# Patient Record
Sex: MALE | Race: WHITE | NOT HISPANIC OR LATINO | Employment: OTHER | URBAN - METROPOLITAN AREA
[De-identification: names, ages, dates, MRNs, and addresses within clinical notes are randomized per-mention and may not be internally consistent; named-entity substitution may affect disease eponyms.]

---

## 2018-05-29 ENCOUNTER — TRANSCRIBE ORDERS (OUTPATIENT)
Dept: LAB | Facility: CLINIC | Age: 78
End: 2018-05-29

## 2018-05-29 ENCOUNTER — LAB (OUTPATIENT)
Dept: LAB | Facility: CLINIC | Age: 78
End: 2018-05-29
Payer: COMMERCIAL

## 2018-05-29 DIAGNOSIS — E11.9 TYPE 2 DIABETES MELLITUS WITHOUT COMPLICATION, UNSPECIFIED WHETHER LONG TERM INSULIN USE (HCC): ICD-10-CM

## 2018-05-29 DIAGNOSIS — E11.9 TYPE 2 DIABETES MELLITUS WITHOUT COMPLICATION, UNSPECIFIED WHETHER LONG TERM INSULIN USE (HCC): Primary | ICD-10-CM

## 2018-05-29 LAB
ANION GAP SERPL CALCULATED.3IONS-SCNC: 5 MMOL/L (ref 4–13)
BUN SERPL-MCNC: 16 MG/DL (ref 5–25)
CALCIUM SERPL-MCNC: 9 MG/DL (ref 8.3–10.1)
CHLORIDE SERPL-SCNC: 103 MMOL/L (ref 100–108)
CO2 SERPL-SCNC: 31 MMOL/L (ref 21–32)
CREAT SERPL-MCNC: 0.89 MG/DL (ref 0.6–1.3)
EST. AVERAGE GLUCOSE BLD GHB EST-MCNC: 189 MG/DL
GFR SERPL CREATININE-BSD FRML MDRD: 81 ML/MIN/1.73SQ M
GLUCOSE P FAST SERPL-MCNC: 129 MG/DL (ref 65–99)
HBA1C MFR BLD: 8.2 % (ref 4.2–6.3)
POTASSIUM SERPL-SCNC: 4 MMOL/L (ref 3.5–5.3)
SODIUM SERPL-SCNC: 139 MMOL/L (ref 136–145)

## 2018-05-29 PROCEDURE — 83036 HEMOGLOBIN GLYCOSYLATED A1C: CPT

## 2018-05-29 PROCEDURE — 80048 BASIC METABOLIC PNL TOTAL CA: CPT

## 2018-05-29 PROCEDURE — 36415 COLL VENOUS BLD VENIPUNCTURE: CPT

## 2018-09-27 ENCOUNTER — LAB (OUTPATIENT)
Dept: LAB | Facility: CLINIC | Age: 78
End: 2018-09-27
Payer: COMMERCIAL

## 2018-09-27 ENCOUNTER — TRANSCRIBE ORDERS (OUTPATIENT)
Dept: LAB | Facility: CLINIC | Age: 78
End: 2018-09-27

## 2018-09-27 DIAGNOSIS — E78.5 HYPERLIPIDEMIA, UNSPECIFIED HYPERLIPIDEMIA TYPE: ICD-10-CM

## 2018-09-27 DIAGNOSIS — E78.5 HYPERLIPIDEMIA, UNSPECIFIED HYPERLIPIDEMIA TYPE: Primary | ICD-10-CM

## 2018-09-27 LAB
ANION GAP SERPL CALCULATED.3IONS-SCNC: 6 MMOL/L (ref 4–13)
AST SERPL W P-5'-P-CCNC: 14 U/L (ref 5–45)
BUN SERPL-MCNC: 28 MG/DL (ref 5–25)
CALCIUM SERPL-MCNC: 9 MG/DL (ref 8.3–10.1)
CHLORIDE SERPL-SCNC: 103 MMOL/L (ref 100–108)
CO2 SERPL-SCNC: 31 MMOL/L (ref 21–32)
CREAT SERPL-MCNC: 1.31 MG/DL (ref 0.6–1.3)
EST. AVERAGE GLUCOSE BLD GHB EST-MCNC: 174 MG/DL
GFR SERPL CREATININE-BSD FRML MDRD: 51 ML/MIN/1.73SQ M
GLUCOSE P FAST SERPL-MCNC: 137 MG/DL (ref 65–99)
HBA1C MFR BLD: 7.7 % (ref 4.2–6.3)
LDLC SERPL DIRECT ASSAY-MCNC: 115 MG/DL (ref 0–100)
POTASSIUM SERPL-SCNC: 4.1 MMOL/L (ref 3.5–5.3)
SODIUM SERPL-SCNC: 140 MMOL/L (ref 136–145)

## 2018-09-27 PROCEDURE — 83036 HEMOGLOBIN GLYCOSYLATED A1C: CPT

## 2018-09-27 PROCEDURE — 80048 BASIC METABOLIC PNL TOTAL CA: CPT

## 2018-09-27 PROCEDURE — 36415 COLL VENOUS BLD VENIPUNCTURE: CPT

## 2018-09-27 PROCEDURE — 84450 TRANSFERASE (AST) (SGOT): CPT

## 2018-09-27 PROCEDURE — 83721 ASSAY OF BLOOD LIPOPROTEIN: CPT

## 2019-01-16 DIAGNOSIS — I10 ESSENTIAL HYPERTENSION: Primary | ICD-10-CM

## 2019-01-17 RX ORDER — LISINOPRIL 5 MG/1
TABLET ORAL
Qty: 90 TABLET | Refills: 3 | Status: SHIPPED | OUTPATIENT
Start: 2019-01-17 | End: 2019-01-25 | Stop reason: HOSPADM

## 2019-01-23 ENCOUNTER — APPOINTMENT (EMERGENCY)
Dept: CT IMAGING | Facility: HOSPITAL | Age: 79
DRG: 844 | End: 2019-01-23
Payer: COMMERCIAL

## 2019-01-23 ENCOUNTER — HOSPITAL ENCOUNTER (INPATIENT)
Facility: HOSPITAL | Age: 79
LOS: 2 days | Discharge: HOME | DRG: 844 | End: 2019-01-25
Attending: EMERGENCY MEDICINE | Admitting: INTERNAL MEDICINE
Payer: COMMERCIAL

## 2019-01-23 DIAGNOSIS — N17.9 ACUTE RENAL FAILURE (ARF) (HCC): ICD-10-CM

## 2019-01-23 DIAGNOSIS — I10 ESSENTIAL HYPERTENSION: Chronic | ICD-10-CM

## 2019-01-23 DIAGNOSIS — D64.9 ANEMIA, UNSPECIFIED TYPE: ICD-10-CM

## 2019-01-23 DIAGNOSIS — E08.22 DIABETES MELLITUS DUE TO UNDERLYING CONDITION WITH STAGE 3 CHRONIC KIDNEY DISEASE, UNSPECIFIED WHETHER LONG TERM INSULIN USE: Chronic | ICD-10-CM

## 2019-01-23 DIAGNOSIS — N13.4 HYDROURETER: Chronic | ICD-10-CM

## 2019-01-23 DIAGNOSIS — N18.30 ACUTE RENAL FAILURE SUPERIMPOSED ON STAGE 3 CHRONIC KIDNEY DISEASE (HCC): Chronic | ICD-10-CM

## 2019-01-23 DIAGNOSIS — N17.9 ACUTE RENAL FAILURE SUPERIMPOSED ON STAGE 3 CHRONIC KIDNEY DISEASE (HCC): Chronic | ICD-10-CM

## 2019-01-23 DIAGNOSIS — N18.3 DIABETES MELLITUS DUE TO UNDERLYING CONDITION WITH STAGE 3 CHRONIC KIDNEY DISEASE, UNSPECIFIED WHETHER LONG TERM INSULIN USE: Chronic | ICD-10-CM

## 2019-01-23 DIAGNOSIS — R19.00 ABDOMINAL MASS: Primary | ICD-10-CM

## 2019-01-23 PROBLEM — R53.1 WEAKNESS GENERALIZED: Chronic | Status: ACTIVE | Noted: 2019-01-23

## 2019-01-23 PROBLEM — E78.5 HYPERLIPIDEMIA: Chronic | Status: ACTIVE | Noted: 2019-01-23

## 2019-01-23 PROBLEM — R19.07 GENERALIZED ABDOMINAL MASS: Status: ACTIVE | Noted: 2019-01-23

## 2019-01-23 PROBLEM — R19.7 DIARRHEA: Chronic | Status: ACTIVE | Noted: 2019-01-23

## 2019-01-23 PROBLEM — I51.9 CARDIAC DISEASE: Chronic | Status: ACTIVE | Noted: 2019-01-23

## 2019-01-23 PROBLEM — R19.07 GENERALIZED ABDOMINAL MASS: Chronic | Status: ACTIVE | Noted: 2019-01-23

## 2019-01-23 PROBLEM — R53.1 WEAKNESS GENERALIZED: Status: ACTIVE | Noted: 2019-01-23

## 2019-01-23 PROBLEM — R19.7 DIARRHEA: Status: ACTIVE | Noted: 2019-01-23

## 2019-01-23 PROBLEM — E11.9 DIABETES MELLITUS (HCC): Chronic | Status: ACTIVE | Noted: 2019-01-23

## 2019-01-23 LAB
ALBUMIN SERPL BCP-MCNC: 4.2 G/DL (ref 3.5–5.7)
ALP SERPL-CCNC: 109 U/L (ref 55–165)
ALT SERPL W P-5'-P-CCNC: 9 U/L (ref 7–52)
ANION GAP SERPL CALCULATED.3IONS-SCNC: 14 MMOL/L (ref 4–13)
APTT PPP: 30 SECONDS (ref 26–38)
AST SERPL W P-5'-P-CCNC: 11 U/L (ref 13–39)
BACTERIA UR QL AUTO: ABNORMAL /HPF
BASOPHILS # BLD AUTO: 0.1 THOUSANDS/ΜL (ref 0–0.1)
BASOPHILS NFR BLD AUTO: 1 % (ref 0–2)
BILIRUB SERPL-MCNC: 0.3 MG/DL (ref 0.2–1)
BILIRUB UR QL STRIP: NEGATIVE
BUN SERPL-MCNC: 102 MG/DL (ref 7–25)
CALCIUM SERPL-MCNC: 9.2 MG/DL (ref 8.6–10.5)
CHLORIDE SERPL-SCNC: 99 MMOL/L (ref 98–107)
CLARITY UR: CLEAR
CO2 SERPL-SCNC: 18 MMOL/L (ref 21–31)
COLOR UR: ABNORMAL
CREAT SERPL-MCNC: 6.89 MG/DL (ref 0.7–1.3)
CRP SERPL HS-MCNC: 14.3 MG/L
EOSINOPHIL # BLD AUTO: 0.1 THOUSAND/ΜL (ref 0–0.61)
EOSINOPHIL NFR BLD AUTO: 1 % (ref 0–5)
ERYTHROCYTE [DISTWIDTH] IN BLOOD BY AUTOMATED COUNT: 13.1 % (ref 11.5–14.5)
GFR SERPL CREATININE-BSD FRML MDRD: 7 ML/MIN/1.73SQ M
GLUCOSE SERPL-MCNC: 158 MG/DL (ref 65–140)
GLUCOSE SERPL-MCNC: 205 MG/DL (ref 65–140)
GLUCOSE SERPL-MCNC: 327 MG/DL (ref 65–99)
GLUCOSE UR STRIP-MCNC: NEGATIVE MG/DL
HCT VFR BLD AUTO: 34.2 % (ref 36.5–49.3)
HEMOCCULT SP1 STL QL: NEGATIVE
HGB BLD-MCNC: 11.4 G/DL (ref 14–18)
HGB UR QL STRIP.AUTO: ABNORMAL
INR PPP: 1.06 (ref 0.9–1.5)
KETONES UR STRIP-MCNC: NEGATIVE MG/DL
LEUKOCYTE ESTERASE UR QL STRIP: NEGATIVE
LIPASE SERPL-CCNC: 172 U/L (ref 11–82)
LYMPHOCYTES # BLD AUTO: 0.6 THOUSANDS/ΜL (ref 0.6–4.47)
LYMPHOCYTES NFR BLD AUTO: 5 % (ref 21–51)
MCH RBC QN AUTO: 30.2 PG (ref 26–34)
MCHC RBC AUTO-ENTMCNC: 33.3 G/DL (ref 31–37)
MCV RBC AUTO: 91 FL (ref 81–99)
MONOCYTES # BLD AUTO: 0.6 THOUSAND/ΜL (ref 0.17–1.22)
MONOCYTES NFR BLD AUTO: 6 % (ref 2–12)
NEUTROPHILS # BLD AUTO: 9.3 THOUSANDS/ΜL (ref 1.4–6.5)
NEUTS SEG NFR BLD AUTO: 88 % (ref 42–75)
NITRITE UR QL STRIP: NEGATIVE
NON-SQ EPI CELLS URNS QL MICRO: ABNORMAL /HPF
NRBC BLD AUTO-RTO: 0 /100 WBCS
PH UR STRIP.AUTO: 5.5 [PH] (ref 5–8)
PLATELET # BLD AUTO: 200 THOUSANDS/UL (ref 149–390)
PMV BLD AUTO: 9.6 FL (ref 8.6–11.7)
POTASSIUM SERPL-SCNC: 5.3 MMOL/L (ref 3.5–5.5)
PROT SERPL-MCNC: 7.5 G/DL (ref 6.4–8.9)
PROT UR STRIP-MCNC: NEGATIVE MG/DL
PROTHROMBIN TIME: 12.3 SECONDS (ref 10.2–13)
RBC # BLD AUTO: 3.78 MILLION/UL (ref 4.3–5.9)
RBC #/AREA URNS AUTO: ABNORMAL /HPF
SODIUM SERPL-SCNC: 131 MMOL/L (ref 134–143)
SP GR UR STRIP.AUTO: 1.02 (ref 1–1.03)
UROBILINOGEN UR QL STRIP.AUTO: 0.2 E.U./DL
WBC # BLD AUTO: 10.6 THOUSAND/UL (ref 4.8–10.8)
WBC #/AREA URNS AUTO: ABNORMAL /HPF

## 2019-01-23 PROCEDURE — 81003 URINALYSIS AUTO W/O SCOPE: CPT | Performed by: EMERGENCY MEDICINE

## 2019-01-23 PROCEDURE — 85025 COMPLETE CBC W/AUTO DIFF WBC: CPT | Performed by: EMERGENCY MEDICINE

## 2019-01-23 PROCEDURE — 74176 CT ABD & PELVIS W/O CONTRAST: CPT

## 2019-01-23 PROCEDURE — 87177 OVA AND PARASITES SMEARS: CPT | Performed by: NURSE PRACTITIONER

## 2019-01-23 PROCEDURE — 99285 EMERGENCY DEPT VISIT HI MDM: CPT

## 2019-01-23 PROCEDURE — 96360 HYDRATION IV INFUSION INIT: CPT

## 2019-01-23 PROCEDURE — 87209 SMEAR COMPLEX STAIN: CPT | Performed by: NURSE PRACTITIONER

## 2019-01-23 PROCEDURE — 82270 OCCULT BLOOD FECES: CPT | Performed by: NURSE PRACTITIONER

## 2019-01-23 PROCEDURE — 87505 NFCT AGENT DETECTION GI: CPT | Performed by: NURSE PRACTITIONER

## 2019-01-23 PROCEDURE — 83690 ASSAY OF LIPASE: CPT | Performed by: EMERGENCY MEDICINE

## 2019-01-23 PROCEDURE — 81001 URINALYSIS AUTO W/SCOPE: CPT | Performed by: EMERGENCY MEDICINE

## 2019-01-23 PROCEDURE — 85730 THROMBOPLASTIN TIME PARTIAL: CPT | Performed by: EMERGENCY MEDICINE

## 2019-01-23 PROCEDURE — 99222 1ST HOSP IP/OBS MODERATE 55: CPT | Performed by: NURSE PRACTITIONER

## 2019-01-23 PROCEDURE — 82948 REAGENT STRIP/BLOOD GLUCOSE: CPT

## 2019-01-23 PROCEDURE — 82272 OCCULT BLD FECES 1-3 TESTS: CPT

## 2019-01-23 PROCEDURE — 86141 C-REACTIVE PROTEIN HS: CPT | Performed by: EMERGENCY MEDICINE

## 2019-01-23 PROCEDURE — 80053 COMPREHEN METABOLIC PANEL: CPT | Performed by: EMERGENCY MEDICINE

## 2019-01-23 PROCEDURE — 85610 PROTHROMBIN TIME: CPT | Performed by: EMERGENCY MEDICINE

## 2019-01-23 PROCEDURE — 87493 C DIFF AMPLIFIED PROBE: CPT | Performed by: NURSE PRACTITIONER

## 2019-01-23 PROCEDURE — 36415 COLL VENOUS BLD VENIPUNCTURE: CPT | Performed by: EMERGENCY MEDICINE

## 2019-01-23 PROCEDURE — 96361 HYDRATE IV INFUSION ADD-ON: CPT

## 2019-01-23 PROCEDURE — 87205 SMEAR GRAM STAIN: CPT | Performed by: NURSE PRACTITIONER

## 2019-01-23 RX ORDER — ONDANSETRON 2 MG/ML
4 INJECTION INTRAMUSCULAR; INTRAVENOUS EVERY 6 HOURS PRN
Status: DISCONTINUED | OUTPATIENT
Start: 2019-01-23 | End: 2019-01-25 | Stop reason: HOSPADM

## 2019-01-23 RX ORDER — GLIPIZIDE 5 MG/1
5 TABLET ORAL
Status: ON HOLD | COMMUNITY
End: 2019-01-25

## 2019-01-23 RX ORDER — SODIUM CHLORIDE 9 MG/ML
100 INJECTION, SOLUTION INTRAVENOUS CONTINUOUS
Status: DISPENSED | OUTPATIENT
Start: 2019-01-23 | End: 2019-01-24

## 2019-01-23 RX ORDER — ACETAMINOPHEN 325 MG/1
650 TABLET ORAL EVERY 6 HOURS PRN
Status: DISCONTINUED | OUTPATIENT
Start: 2019-01-23 | End: 2019-01-25 | Stop reason: HOSPADM

## 2019-01-23 RX ORDER — ASPIRIN 81 MG/1
81 TABLET, CHEWABLE ORAL DAILY
Status: DISCONTINUED | OUTPATIENT
Start: 2019-01-24 | End: 2019-01-25 | Stop reason: HOSPADM

## 2019-01-23 RX ORDER — HEPARIN SODIUM 5000 [USP'U]/ML
5000 INJECTION, SOLUTION INTRAVENOUS; SUBCUTANEOUS EVERY 8 HOURS SCHEDULED
Status: DISCONTINUED | OUTPATIENT
Start: 2019-01-23 | End: 2019-01-25 | Stop reason: HOSPADM

## 2019-01-23 RX ADMIN — HEPARIN SODIUM 5000 UNITS: 5000 INJECTION INTRAVENOUS; SUBCUTANEOUS at 21:47

## 2019-01-23 RX ADMIN — INSULIN LISPRO 1 UNITS: 100 INJECTION, SOLUTION INTRAVENOUS; SUBCUTANEOUS at 18:29

## 2019-01-23 RX ADMIN — INSULIN LISPRO 1 UNITS: 100 INJECTION, SOLUTION INTRAVENOUS; SUBCUTANEOUS at 22:07

## 2019-01-23 RX ADMIN — IOHEXOL 50 ML: 240 INJECTION, SOLUTION INTRATHECAL; INTRAVASCULAR; INTRAVENOUS; ORAL at 14:00

## 2019-01-23 RX ADMIN — SODIUM CHLORIDE 1000 ML: 0.9 INJECTION, SOLUTION INTRAVENOUS at 13:47

## 2019-01-23 RX ADMIN — SODIUM CHLORIDE 100 ML/HR: 9 INJECTION, SOLUTION INTRAVENOUS at 18:06

## 2019-01-23 NOTE — H&P
H&P- Keren Lopes 1940, 77 y o  male MRN: [id number]    Unit/Bed#: -01 Encounter: [id number]    Primary Care Provider: Jeferson Sidhu DO   Date and time admitted to hospital: 1/23/2019  1:17 PM        * Generalized abdominal mass   Assessment & Plan    · Consult to Dr Sharan Lin oncology  CT abdomen pelvis wo contrast :      Reason For Exam     left sided ab pain, renal failure   Study Result     CT ABDOMEN AND PELVIS WITHOUT IV CONTRAST     INDICATION:   left sided ab pain, renal failure      COMPARISON:  None      TECHNIQUE:  CT examination of the abdomen and pelvis was performed without intravenous contrast   Axial, sagittal, and coronal 2D reformatted images were created from the source data and submitted for interpretation       Radiation dose length product (DLP) for this visit:  367 8 mGy-cm   This examination, like all CT scans performed in the [facility], was performed utilizing techniques to minimize radiation dose exposure, including the use of iterative   reconstruction and automated exposure control       Enteric contrast was administered       FINDINGS:     ABDOMEN     LOWER CHEST:  Lung bases are clear  Coronary artery calcification noted      LIVER/BILIARY TREE:  There are numerous low density liver masses concerning for metastatic disease  These vary in size from quite small up to about 5 cm  Visualization limited without the benefit of contrast   Overall, the liver is not significantly   enlarged  No gross evidence of biliary obstruction      GALLBLADDER:  No calcified gallstones  No pericholecystic inflammatory change      SPLEEN:  Unremarkable      PANCREAS:  Unremarkable      ADRENAL GLANDS:  Unremarkable      KIDNEYS/URETERS:  There is mild bilateral hydroureteronephrosis  No kidney stones are seen  No perirenal fluid  The cause of the obstruction seems to most likely be severe bladder wall thickening      STOMACH AND BOWEL:  There is no bowel obstruction  No bowel wall thickening or acute inflammation      APPENDIX:  No findings to suggest appendicitis      ABDOMINOPELVIC CAVITY:  There is a partially calcified and partially soft tissue density mass in the mesentery which has an irregular lobulated contour  The size is difficult to measure but it is on the order of 7 cm maximum diameter  This might   represent carcinoid or desmoid or fibromatosis, however, given the presence of numerous liver lesions, it is probably a malignant lesion  There is questionably a 2nd much smaller lesion in the left lower quadrant adjacent to the sigmoid colon which also   demonstrates mixed density characteristics and is only about 11 x 14 mm  There is no ascites or free air      VESSELS:  Atherosclerotic changes are present  No evidence of aneurysm      PELVIS     REPRODUCTIVE ORGANS:  There is prostatomegaly estimated to be 5 8 x 5 5 cm      URINARY BLADDER:  Bladder is collapsed around Haile catheter and appears quite thick walled diffusely  No stones are seen  No obvious inflammation      ABDOMINAL WALL/INGUINAL REGIONS:  Unremarkable      OSSEOUS STRUCTURES:  No acute fracture or destructive osseous lesion      IMPRESSION:     Partially calcified mesenteric mass concerning for a malignant lesion, perhaps a carcinoid with numerous low-density liver masses almost certainly metastatic disease  Suggest consideration for tissue sampling with biopsy of one of the liver lesions      Bilateral hydroureteronephrosis probably secondary to severe bladder wall thickening    Haile catheter appear satisfactory in position with no significant distention of the bladder      Prostatomegaly      Questionable secondary small mass in the left lower quadrant mesentery         Workstation performed: [id number]    ·     ·       Hydroureter   Assessment & Plan    · Consult Dr Atul Stevens     Acute renal failure superimposed on stage 3 chronic kidney disease [facility])   Assessment & Plan    · Haile catheter has been placed we will monitor labs     Diarrhea   Assessment & Plan    · Stool studies will be obtained     Weakness generalized   Assessment & Plan    · Fall precautions     Diabetes mellitus [facility])   Assessment & Plan    Lab Results   Component Value Date    HGBA1C 7 7 (H) 09/27/2018       No results for input(s): POCGLU in the last 72 hours  Blood Sugar Average: Last 72 hrs:  ·  chronic condition   · Accu-Cheks Q a c  And HS with sliding scale insulin     Hypertension   Assessment & Plan    · Chronic and stable  · Lisinopril will be on hold secondary to kidney failure     Hyperlipidemia   Assessment & Plan    · Chronic and stable  · Diet controlled     Cardiac disease   Assessment & Plan    · Chronic and stable         VTE Prophylaxis: Heparin  Code Status:  Full  POLST: POLST is not applicable to this patient  Discussion with family:  Need to stay for consultations with Urology and Oncology    Anticipated Length of Stay:  Patient will be admitted on an Inpatient basis with an anticipated length of stay of  > 2 midnights  Justification for Hospital Stay:  Stay for consultations with Urology and Oncology    Total Time for Visit, including Counseling / Coordination of Care: 70   Greater than 50% of this total time spent on direct patient counseling and coordination of care  Chief Complaint:   Left-sided abdominal pain    History of Present Illness:    Arpita Burks is a 77 y o  male who presents with left-sided abdominal pain with diarrhea for 2 weeks weakness, and vomited 2 times last evening  Patient states that at times he has had watery and black stools but he does take iron on a daily basis  He states that he has had an EGD and colonoscopy in the past year  Patient had CT of the abdomen and showed mesenteric mass and bilateral hydro ureteral nephrosis    Review of Systems:  Review of Systems   Constitutional: Negative  HENT: Negative  Eyes: Negative  Respiratory: Negative  Cardiovascular: Negative  Gastrointestinal: Positive for abdominal pain and diarrhea  Endocrine: Negative  Genitourinary: Negative  Musculoskeletal: Negative  Skin: Negative  Allergic/Immunologic: Negative  Neurological: Negative  Hematological: Negative  Psychiatric/Behavioral: Negative  Past Medical and Surgical History:   Past Medical History:   Diagnosis Date    Cardiac disease     Diabetes mellitus ([facility] [address] )     Hyperlipidemia     Hypertension        History reviewed  No pertinent surgical history  Meds/Allergies:  Prior to Admission medications    Medication Sig Start Date End Date Taking? Authorizing Provider   aspirin 81 MG tablet Take 81 mg by mouth daily   Yes Historical Provider, MD   glipiZIDE (GLUCOTROL) 5 mg tablet Take 5 mg by mouth 2 (two) times a day before meals   Yes Historical Provider, MD   Red Yeast Rice Extract (RED YEAST RICE PO) Take by mouth   Yes Historical Provider, MD   lisinopril (ZESTRIL) 5 mg tablet TAKE ONE TABLET BY MOUTH EVERY DAY 1/17/19   Marquez Callaway PA-C     I have reviewed home medications with patient personally  Allergies: No Known Allergies    Social History:  Marital Status:     Occupation:  Retired  Patient Pre-hospital Living Situation:  At home  Patient Pre-hospital Level of Mobility:  Full  Patient Pre-hospital Diet Restrictions:  Diabetic  Substance Use History:     History   Alcohol Use No     History   Smoking Status    Never Smoker   Smokeless Tobacco    Never Used     History   Drug Use No       Family History:  I have reviewed the patients family history    Physical Exam:   Vitals:   Blood Pressure: 162/82 (01/23/19 1741)  Pulse: 91 (01/23/19 1741)  Temperature: (!) 97 4 °F (36 3 °C) (01/23/19 1741)  Temp Source: Tympanic (01/23/19 1741)  Respirations: 18 (01/23/19 1741)  Height: 5' 9" (175 3 cm) (01/23/19 1314)  Weight - Scale: 70 3 kg (155 lb) (01/23/19 1314)  SpO2: 99 % (01/23/19 1741)    Physical Exam Constitutional: He is oriented to person, place, and time  Vital signs are normal  He appears well-developed and well-nourished  He is cooperative  HENT:   Head: Normocephalic and atraumatic  Nose: Nose normal    Mouth/Throat: Mucous membranes are normal    Eyes: Pupils are equal, round, and reactive to light  Conjunctivae and EOM are normal    Neck: Normal range of motion and full passive range of motion without pain  Neck supple  Cardiovascular: Normal rate, regular rhythm, normal heart sounds and normal pulses  Pulmonary/Chest: Effort normal and breath sounds normal    Abdominal: Normal appearance  There is generalized tenderness  Genitourinary:   Genitourinary Comments: Haile catheter with clear but blood tinged urine   Musculoskeletal: Normal range of motion  Neurological: He is alert and oriented to person, place, and time  Skin: Skin is warm  Psychiatric: He has a normal mood and affect  His speech is normal and behavior is normal        Additional Data:   Lab Results: I have personally reviewed pertinent reports  Results from last 7 days  Lab Units 01/23/19  1342   WBC Thousand/uL 10 60   HEMOGLOBIN g/dL 11 4*   HEMATOCRIT % 34 2*   PLATELETS Thousands/uL 200   NEUTROS PCT % 88*   LYMPHS PCT % 5*   MONOS PCT % 6   EOS PCT % 1       Results from last 7 days  Lab Units 01/23/19  1342   POTASSIUM mmol/L 5 3   CHLORIDE mmol/L 99   CO2 mmol/L 18*   BUN mg/dL 102*   CREATININE mg/dL 6 89*   CALCIUM mg/dL 9 2   ALK PHOS U/L 109   ALT U/L 9   AST U/L 11*       Results from last 7 days  Lab Units 01/23/19  1342   INR  1 06               Imaging: I have personally reviewed pertinent reports  CT abdomen pelvis wo contrast   Final Result by Trisha Torres MD (01/23 1646)      Partially calcified mesenteric mass concerning for a malignant lesion, perhaps a carcinoid with numerous low-density liver masses almost certainly metastatic disease    Suggest consideration for tissue sampling with biopsy of one of the liver lesions  Bilateral hydroureteronephrosis probably secondary to severe bladder wall thickening  Haile catheter appear satisfactory in position with no significant distention of the bladder  Prostatomegaly  Questionable secondary small mass in the left lower quadrant mesentery  Workstation performed: [id number]         IR consult    (Results Pending)       EKG, Pathology, and Other Studies Reviewed on Admission:   · EKG:  Not applicable    NetAccess/Epic Records Reviewed: No     ** Please Note: This note has been constructed using a voice recognition system   **

## 2019-01-23 NOTE — ASSESSMENT & PLAN NOTE
· Consult to Dr Orlando Villanueva oncology  CT abdomen pelvis wo contrast :      Reason For Exam     left sided ab pain, renal failure   Study Result     CT ABDOMEN AND PELVIS WITHOUT IV CONTRAST     INDICATION:   left sided ab pain, renal failure      COMPARISON:  None      TECHNIQUE:  CT examination of the abdomen and pelvis was performed without intravenous contrast   Axial, sagittal, and coronal 2D reformatted images were created from the source data and submitted for interpretation       Radiation dose length product (DLP) for this visit:  367 8 mGy-cm   This examination, like all CT scans performed in the [facility], was performed utilizing techniques to minimize radiation dose exposure, including the use of iterative   reconstruction and automated exposure control       Enteric contrast was administered       FINDINGS:     ABDOMEN     LOWER CHEST:  Lung bases are clear  Coronary artery calcification noted      LIVER/BILIARY TREE:  There are numerous low density liver masses concerning for metastatic disease  These vary in size from quite small up to about 5 cm  Visualization limited without the benefit of contrast   Overall, the liver is not significantly   enlarged  No gross evidence of biliary obstruction      GALLBLADDER:  No calcified gallstones  No pericholecystic inflammatory change      SPLEEN:  Unremarkable      PANCREAS:  Unremarkable      ADRENAL GLANDS:  Unremarkable      KIDNEYS/URETERS:  There is mild bilateral hydroureteronephrosis  No kidney stones are seen  No perirenal fluid  The cause of the obstruction seems to most likely be severe bladder wall thickening      STOMACH AND BOWEL:  There is no bowel obstruction  No bowel wall thickening or acute inflammation      APPENDIX:  No findings to suggest appendicitis      ABDOMINOPELVIC CAVITY:  There is a partially calcified and partially soft tissue density mass in the mesentery which has an irregular lobulated contour    The size is difficult to measure but it is on the order of 7 cm maximum diameter  This might   represent carcinoid or desmoid or fibromatosis, however, given the presence of numerous liver lesions, it is probably a malignant lesion  There is questionably a 2nd much smaller lesion in the left lower quadrant adjacent to the sigmoid colon which also   demonstrates mixed density characteristics and is only about 11 x 14 mm  There is no ascites or free air      VESSELS:  Atherosclerotic changes are present  No evidence of aneurysm      PELVIS     REPRODUCTIVE ORGANS:  There is prostatomegaly estimated to be 5 8 x 5 5 cm      URINARY BLADDER:  Bladder is collapsed around Haile catheter and appears quite thick walled diffusely  No stones are seen  No obvious inflammation      ABDOMINAL WALL/INGUINAL REGIONS:  Unremarkable      OSSEOUS STRUCTURES:  No acute fracture or destructive osseous lesion      IMPRESSION:     Partially calcified mesenteric mass concerning for a malignant lesion, perhaps a carcinoid with numerous low-density liver masses almost certainly metastatic disease  Suggest consideration for tissue sampling with biopsy of one of the liver lesions      Bilateral hydroureteronephrosis probably secondary to severe bladder wall thickening    Haile catheter appear satisfactory in position with no significant distention of the bladder      Prostatomegaly      Questionable secondary small mass in the left lower quadrant mesentery         Workstation performed: [id number]      ·   ·

## 2019-01-23 NOTE — ASSESSMENT & PLAN NOTE
Lab Results   Component Value Date    HGBA1C 7 7 (H) 09/27/2018       No results for input(s): POCGLU in the last 72 hours  Blood Sugar Average: Last 72 hrs:  ·  chronic condition   · Accu-Cheks Q a c   And HS with sliding scale insulin

## 2019-01-23 NOTE — ED PROVIDER NOTES
History  Chief Complaint   Patient presents with    Diarrhea     diarrhea for the past two weeks  vomited twice last night    Weakness - Generalized     Diarrhea for 2-3 days, at times watery, at times "black" (does take iron), w/malaise and decreased appetite, w/N-V today (non-bloody) came to ED  Denies recent antibiotics  Reports had upper and lower -oscopies within past year and had "gastritis" (details unclear)  No fever, chest pain, SOB or dysuria  Prior to Admission Medications   Prescriptions Last Dose Informant Patient Reported? Taking? Red Yeast Rice Extract (RED YEAST RICE PO)   Yes Yes   Sig: Take by mouth   aspirin 81 MG tablet   Yes Yes   Sig: Take 81 mg by mouth daily   glipiZIDE (GLUCOTROL) 5 mg tablet   Yes Yes   Sig: Take 5 mg by mouth 2 (two) times a day before meals   lisinopril (ZESTRIL) 5 mg tablet   No No   Sig: TAKE ONE TABLET BY MOUTH EVERY DAY      Facility-Administered Medications: None       Past Medical History:   Diagnosis Date    Cardiac disease     Diabetes mellitus ([facility] [address] )     Hyperlipidemia     Hypertension        History reviewed  No pertinent surgical history  History reviewed  No pertinent family history  I have reviewed and agree with the history as documented  Social History   Substance Use Topics    Smoking status: Never Smoker    Smokeless tobacco: Never Used    Alcohol use No        Review of Systems   Constitutional: Negative for chills and fever  HENT: Negative for rhinorrhea and sore throat  Eyes: Negative for visual disturbance  Respiratory: Negative for cough and shortness of breath  Cardiovascular: Negative for chest pain and leg swelling  Gastrointestinal: Positive for abdominal pain, diarrhea, nausea and vomiting  Genitourinary: Negative for dysuria  Musculoskeletal: Negative for back pain and myalgias  Skin: Negative for rash  Neurological: Negative for dizziness and headaches     Psychiatric/Behavioral: Negative for confusion  All other systems reviewed and are negative  Physical Exam  Physical Exam   Constitutional: He is oriented to person, place, and time  He appears well-developed and well-nourished  HENT:   Nose: Nose normal    Mouth/Throat: Oropharynx is clear and moist  No oropharyngeal exudate  Eyes: Pupils are equal, round, and reactive to light  Conjunctivae and EOM are normal  No scleral icterus  Neck: Normal range of motion  Neck supple  No JVD present  No tracheal deviation present  Cardiovascular: Normal rate, regular rhythm and normal heart sounds  No murmur heard  Pulmonary/Chest: Effort normal and breath sounds normal  No respiratory distress  He has no wheezes  He has no rales  Abdominal: Soft  There is tenderness in the left upper quadrant and left lower quadrant  There is no guarding and no tenderness at McBurney's point  Rectal: non-tender, stool dark brown and Guaiac negative    Musculoskeletal: Normal range of motion  He exhibits no edema or tenderness  Neurological: He is alert and oriented to person, place, and time  No cranial nerve deficit or sensory deficit  He exhibits normal muscle tone  5/5 motor, nl sens   Skin: Skin is warm and dry  Mild pallor noted   Psychiatric: He has a normal mood and affect  His behavior is normal    Nursing note and vitals reviewed        Vital Signs  ED Triage Vitals [01/23/19 1314]   Temperature Pulse Respirations Blood Pressure SpO2   97 6 °F (36 4 °C) 103 18 (!) 185/79 100 %      Temp Source Heart Rate Source Patient Position - Orthostatic VS BP Location FiO2 (%)   Temporal Monitor Sitting Left arm --      Pain Score       No Pain           Vitals:    01/23/19 1314 01/23/19 1715 01/23/19 1741   BP: (!) 185/79 (!) 196/88 162/82   Pulse: 103 84 91   Patient Position - Orthostatic VS: Sitting  Sitting       Visual Acuity      ED Medications  Medications   aspirin chewable tablet 81 mg (not administered)   sodium chloride 0 9 % infusion (100 mL/hr Intravenous New Bag 1/23/19 1806)   ondansetron (ZOFRAN) injection 4 mg (not administered)   heparin (porcine) subcutaneous injection 5,000 Units (not administered)   insulin lispro (HumaLOG) 100 units/mL subcutaneous injection 1-5 Units (not administered)   insulin lispro (HumaLOG) 100 units/mL subcutaneous injection 1-5 Units (not administered)   acetaminophen (TYLENOL) tablet 650 mg (not administered)   sodium chloride 0 9 % bolus 1,000 mL (0 mL Intravenous Stopped 1/23/19 1728)   iohexol (OMNIPAQUE) 240 MG/ML solution 50 mL (50 mL Oral Given 1/23/19 1400)       Diagnostic Studies  Results Reviewed     Procedure Component Value Units Date/Time    High sensitivity CRP [[id number]] Collected:  01/23/19 1342    Lab Status:  Final result Specimen:  Blood from Arm, Right Updated:  01/23/19 1749     CRP, High Sensitivity 14 30 mg/L     Narrative:               HsCRP Level       Relative Risk           <1 0 mg/L          Low           1 0 to 3 0 mg/L    Average           >3 0 mg/L          High      Urine Microscopic [[id number]]  (Abnormal) Collected:  01/23/19 1549    Lab Status:  Final result Specimen:  Urine from Urine, Indwelling Ahile Catheter Updated:  01/23/19 1622     RBC, UA 4-10 (A) /hpf      WBC, UA None Seen /hpf      Epithelial Cells None Seen /hpf      Bacteria, UA Occasional /hpf     UA w Reflex to Microscopic [[id number]]  (Abnormal) Collected:  01/23/19 1549    Lab Status:  Final result Specimen:  Urine from Urine, Indwelling Haile Catheter Updated:  01/23/19 1607     Color, UA Straw     Clarity, UA Clear     Specific [place], UA 1 020     pH, UA 5 5     Leukocytes, UA Negative     Nitrite, UA Negative     Protein, UA Negative mg/dl      Glucose, UA Negative mg/dl      Ketones, UA Negative mg/dl      Urobilinogen, UA 0 2 E U /dl      Bilirubin, UA Negative     Blood, UA 1+ (A)    Comprehensive metabolic panel [[id number]]  (Abnormal) Collected:  01/23/19 1342    Lab Status:  Final result Specimen: Blood from Arm, Right Updated:  01/23/19 1415     Sodium 131 (L) mmol/L      Potassium 5 3 mmol/L      Chloride 99 mmol/L      CO2 18 (L) mmol/L      ANION GAP 14 (H) mmol/L       (H) mg/dL      Creatinine 6 89 (H) mg/dL      Glucose 327 (H) mg/dL      Calcium 9 2 mg/dL      AST 11 (L) U/L      ALT 9 U/L      Alkaline Phosphatase 109 U/L      Total Protein 7 5 g/dL      Albumin 4 2 g/dL      Total Bilirubin 0 30 mg/dL      eGFR 7 ml/min/1 73sq m     Narrative:         National Kidney Disease Education Program recommendations are as follows:  GFR calculation is accurate only with a steady state creatinine  Chronic Kidney disease less than 60 ml/min/1 73 sq  meters  Kidney failure less than 15 ml/min/1 73 sq  meters      Lipase [[id number]]  (Abnormal) Collected:  01/23/19 1342    Lab Status:  Final result Specimen:  Blood from Arm, Right Updated:  01/23/19 1415     Lipase 172 (H) u/L     APTT [[id number]]  (Normal) Collected:  01/23/19 1342    Lab Status:  Final result Specimen:  Blood from Arm, Right Updated:  01/23/19 1401     PTT 30 seconds     Protime-INR [[id number]]  (Normal) Collected:  01/23/19 1342    Lab Status:  Final result Specimen:  Blood from Arm, Right Updated:  01/23/19 1401     Protime 12 3 seconds      INR 1 06    CBC and differential [[id number]]  (Abnormal) Collected:  01/23/19 1342    Lab Status:  Final result Specimen:  Blood from Arm, Right Updated:  01/23/19 1355     WBC 10 60 Thousand/uL      RBC 3 78 (L) Million/uL      Hemoglobin 11 4 (L) g/dL      Hematocrit 34 2 (L) %      MCV 91 fL      MCH 30 2 pg      MCHC 33 3 g/dL      RDW 13 1 %      MPV 9 6 fL      Platelets 519 Thousands/uL      nRBC 0 /100 WBCs      Neutrophils Relative 88 (H) %      Lymphocytes Relative 5 (L) %      Monocytes Relative 6 %      Eosinophils Relative 1 %      Basophils Relative 1 %      Neutrophils Absolute 9 30 (H) Thousands/µL      Lymphocytes Absolute 0 60 Thousands/µL      Monocytes Absolute 0 60 Thousand/µL Eosinophils Absolute 0 10 Thousand/µL      Basophils Absolute 0 10 Thousands/µL                  CT abdomen pelvis wo contrast   Final Result by Kira Sen MD (01/23 1646)      Partially calcified mesenteric mass concerning for a malignant lesion, perhaps a carcinoid with numerous low-density liver masses almost certainly metastatic disease  Suggest consideration for tissue sampling with biopsy of one of the liver lesions  Bilateral hydroureteronephrosis probably secondary to severe bladder wall thickening  Haile catheter appear satisfactory in position with no significant distention of the bladder  Prostatomegaly  Questionable secondary small mass in the left lower quadrant mesentery           Workstation performed: [id number]         IR consult    (Results Pending)              Procedures  Procedures       Phone Contacts  ED Phone Contact    ED Course  ED Course as of Jan 23 1816   Wed Jan 23, 2019   1527 Has acute renal failure; will get CT ab/pelvis w/o IV contrast; RN will place Haile for I/O evaluation     1706 Stable; CT shows likely mesenteric mass w/possible hepatic metastatic disease; CT findings and concern for cancer discussed w/patient and family, and need for admission to get w/u started                                MDM  Number of Diagnoses or Management Options  Diagnosis management comments: Initial Impression: left sided ab pain w/diarrhea, concern for diverticulitis/colitis, no recent antibiotics; will screen with labs, start IVF and get CT ab/pelvis     CritCare Time    Disposition  Final diagnoses:   Abdominal mass   Acute renal failure (ARF) ([address] )     Time reflects when diagnosis was documented in both MDM as applicable and the Disposition within this note     Time User Action Codes Description Comment    1/23/2019  5:08 PM Janki Singer Add [R19 00] Abdominal mass     1/23/2019  5:08 PM Janki Singer Add [N17 9] Acute renal failure (ARF) ([address] )     1/23/2019 5:41 PM Allison First Add [N13 4] Hydroureter     1/23/2019  5:41 PM Allison First Modify [N13 4] Hydroureter     1/23/2019  5:41 PM Allison First Modify [N13 4] Hydroureter     1/23/2019  5:41 PM Meckes, Eboni Larry Add [N17 9,  N18 3] Acute renal failure superimposed on stage 3 chronic kidney disease ([facility] [address] )     1/23/2019  5:41 PM Meckes, Eboni Branford Modify [N17 9,  N18 3] Acute renal failure superimposed on stage 3 chronic kidney disease ([facility] [address] )     1/23/2019  5:41 PM Meckes, Eboni Larry Modify [N17 9,  N18 3] Acute renal failure superimposed on stage 3 chronic kidney disease [facility])       ED Disposition     ED Disposition Condition Comment    Admit  Case was discussed with MLP for Dr Mega Lao and the patient's admission status was agreed to be Admission Status: inpatient status to the service of Dr Mega Lao   Follow-up Information    None         Current Discharge Medication List      CONTINUE these medications which have NOT CHANGED    Details   aspirin 81 MG tablet Take 81 mg by mouth daily      glipiZIDE (GLUCOTROL) 5 mg tablet Take 5 mg by mouth 2 (two) times a day before meals      Red Yeast Rice Extract (RED YEAST RICE PO) Take by mouth      lisinopril (ZESTRIL) 5 mg tablet TAKE ONE TABLET BY MOUTH EVERY DAY  Qty: 90 tablet, Refills: 3    Associated Diagnoses: Essential hypertension           No discharge procedures on file      ED Provider  Electronically Signed by           Ha Ocampo MD  01/23/19 4683

## 2019-01-24 ENCOUNTER — APPOINTMENT (INPATIENT)
Dept: INTERVENTIONAL RADIOLOGY/VASCULAR | Facility: HOSPITAL | Age: 79
DRG: 844 | End: 2019-01-24
Payer: COMMERCIAL

## 2019-01-24 ENCOUNTER — APPOINTMENT (INPATIENT)
Dept: ULTRASOUND IMAGING | Facility: HOSPITAL | Age: 79
DRG: 844 | End: 2019-01-24
Payer: COMMERCIAL

## 2019-01-24 PROBLEM — D64.9 ANEMIA: Status: ACTIVE | Noted: 2019-01-24

## 2019-01-24 PROBLEM — E87.2 METABOLIC ACIDOSIS: Status: ACTIVE | Noted: 2019-01-24

## 2019-01-24 LAB
ALBUMIN SERPL BCP-MCNC: 3.5 G/DL (ref 3.5–5.7)
ALP SERPL-CCNC: 88 U/L (ref 55–165)
ALT SERPL W P-5'-P-CCNC: 8 U/L (ref 7–52)
ANION GAP SERPL CALCULATED.3IONS-SCNC: 14 MMOL/L (ref 4–13)
AST SERPL W P-5'-P-CCNC: 9 U/L (ref 13–39)
BASOPHILS # BLD AUTO: 0.1 THOUSANDS/ΜL (ref 0–0.1)
BASOPHILS NFR BLD AUTO: 1 % (ref 0–2)
BILIRUB SERPL-MCNC: 0.3 MG/DL (ref 0.2–1)
BUN SERPL-MCNC: 92 MG/DL (ref 7–25)
C DIFF TOX GENS STL QL NAA+PROBE: NORMAL
CALCIUM SERPL-MCNC: 8.6 MG/DL (ref 8.6–10.5)
CAMPYLOBACTER DNA SPEC NAA+PROBE: NORMAL
CHLORIDE SERPL-SCNC: 108 MMOL/L (ref 98–107)
CO2 SERPL-SCNC: 17 MMOL/L (ref 21–31)
CREAT SERPL-MCNC: 5.79 MG/DL (ref 0.7–1.3)
CREAT UR-MCNC: 31.8 MG/DL
CREAT UR-MCNC: 31.8 MG/DL
EOSINOPHIL # BLD AUTO: 0.2 THOUSAND/ΜL (ref 0–0.61)
EOSINOPHIL NFR BLD AUTO: 2 % (ref 0–5)
ERYTHROCYTE [DISTWIDTH] IN BLOOD BY AUTOMATED COUNT: 13.2 % (ref 11.5–14.5)
GFR SERPL CREATININE-BSD FRML MDRD: 9 ML/MIN/1.73SQ M
GLUCOSE SERPL-MCNC: 111 MG/DL (ref 65–99)
GLUCOSE SERPL-MCNC: 138 MG/DL (ref 65–140)
GLUCOSE SERPL-MCNC: 208 MG/DL (ref 65–140)
GLUCOSE SERPL-MCNC: 306 MG/DL (ref 65–140)
GLUCOSE SERPL-MCNC: 348 MG/DL (ref 65–140)
HCT VFR BLD AUTO: 28.4 % (ref 36.5–49.3)
HGB BLD-MCNC: 9.5 G/DL (ref 14–18)
INR PPP: 1.08 (ref 0.9–1.5)
LYMPHOCYTES # BLD AUTO: 0.9 THOUSANDS/ΜL (ref 0.6–4.47)
LYMPHOCYTES NFR BLD AUTO: 9 % (ref 21–51)
MAGNESIUM SERPL-MCNC: 2.5 MG/DL (ref 1.9–2.7)
MCH RBC QN AUTO: 30.3 PG (ref 26–34)
MCHC RBC AUTO-ENTMCNC: 33.3 G/DL (ref 31–37)
MCV RBC AUTO: 91 FL (ref 81–99)
MICROALBUMIN UR-MCNC: 625 MG/L (ref 0–20)
MICROALBUMIN/CREAT 24H UR: 1965 MG/G CREATININE (ref 0–30)
MONOCYTES # BLD AUTO: 0.8 THOUSAND/ΜL (ref 0.17–1.22)
MONOCYTES NFR BLD AUTO: 8 % (ref 2–12)
NEUTROPHILS # BLD AUTO: 7.8 THOUSANDS/ΜL (ref 1.4–6.5)
NEUTS SEG NFR BLD AUTO: 80 % (ref 42–75)
NRBC BLD AUTO-RTO: 0 /100 WBCS
PLATELET # BLD AUTO: 154 THOUSANDS/UL (ref 149–390)
PMV BLD AUTO: 9.9 FL (ref 8.6–11.7)
POTASSIUM SERPL-SCNC: 4.8 MMOL/L (ref 3.5–5.5)
PROT SERPL-MCNC: 6.2 G/DL (ref 6.4–8.9)
PROTHROMBIN TIME: 12.5 SECONDS (ref 10.2–13)
RBC # BLD AUTO: 3.13 MILLION/UL (ref 4.3–5.9)
SALMONELLA DNA SPEC QL NAA+PROBE: NORMAL
SHIGA TOXIN STX GENE SPEC NAA+PROBE: NORMAL
SHIGELLA DNA SPEC QL NAA+PROBE: NORMAL
SODIUM 24H UR-SCNC: 64 MOL/L
SODIUM SERPL-SCNC: 139 MMOL/L (ref 134–143)
WBC # BLD AUTO: 9.7 THOUSAND/UL (ref 4.8–10.8)
WBC STL QL MICRO: NORMAL

## 2019-01-24 PROCEDURE — 82043 UR ALBUMIN QUANTITATIVE: CPT | Performed by: INTERNAL MEDICINE

## 2019-01-24 PROCEDURE — 82570 ASSAY OF URINE CREATININE: CPT | Performed by: INTERNAL MEDICINE

## 2019-01-24 PROCEDURE — 88342 IMHCHEM/IMCYTCHM 1ST ANTB: CPT | Performed by: PATHOLOGY

## 2019-01-24 PROCEDURE — 83735 ASSAY OF MAGNESIUM: CPT | Performed by: NURSE PRACTITIONER

## 2019-01-24 PROCEDURE — 85025 COMPLETE CBC W/AUTO DIFF WBC: CPT | Performed by: NURSE PRACTITIONER

## 2019-01-24 PROCEDURE — 47000 NEEDLE BIOPSY OF LIVER PERQ: CPT | Performed by: RADIOLOGY

## 2019-01-24 PROCEDURE — 82607 VITAMIN B-12: CPT | Performed by: INTERNAL MEDICINE

## 2019-01-24 PROCEDURE — 88360 TUMOR IMMUNOHISTOCHEM/MANUAL: CPT | Performed by: PATHOLOGY

## 2019-01-24 PROCEDURE — 85610 PROTHROMBIN TIME: CPT | Performed by: NURSE PRACTITIONER

## 2019-01-24 PROCEDURE — 88341 IMHCHEM/IMCYTCHM EA ADD ANTB: CPT | Performed by: PATHOLOGY

## 2019-01-24 PROCEDURE — 76705 ECHO EXAM OF ABDOMEN: CPT

## 2019-01-24 PROCEDURE — 76942 ECHO GUIDE FOR BIOPSY: CPT

## 2019-01-24 PROCEDURE — 99232 SBSQ HOSP IP/OBS MODERATE 35: CPT | Performed by: INTERNAL MEDICINE

## 2019-01-24 PROCEDURE — 0FB03ZX EXCISION OF LIVER, PERCUTANEOUS APPROACH, DIAGNOSTIC: ICD-10-PCS | Performed by: RADIOLOGY

## 2019-01-24 PROCEDURE — 80053 COMPREHEN METABOLIC PANEL: CPT | Performed by: NURSE PRACTITIONER

## 2019-01-24 PROCEDURE — 82948 REAGENT STRIP/BLOOD GLUCOSE: CPT

## 2019-01-24 PROCEDURE — 88307 TISSUE EXAM BY PATHOLOGIST: CPT | Performed by: PATHOLOGY

## 2019-01-24 PROCEDURE — 76942 ECHO GUIDE FOR BIOPSY: CPT | Performed by: RADIOLOGY

## 2019-01-24 PROCEDURE — 84300 ASSAY OF URINE SODIUM: CPT | Performed by: INTERNAL MEDICINE

## 2019-01-24 PROCEDURE — 47000 NEEDLE BIOPSY OF LIVER PERQ: CPT

## 2019-01-24 RX ORDER — SODIUM CHLORIDE 9 MG/ML
100 INJECTION, SOLUTION INTRAVENOUS CONTINUOUS
Status: DISCONTINUED | OUTPATIENT
Start: 2019-01-24 | End: 2019-01-25 | Stop reason: HOSPADM

## 2019-01-24 RX ORDER — AMLODIPINE BESYLATE 5 MG/1
5 TABLET ORAL DAILY
Status: DISCONTINUED | OUTPATIENT
Start: 2019-01-24 | End: 2019-01-25 | Stop reason: HOSPADM

## 2019-01-24 RX ORDER — FENTANYL CITRATE 50 UG/ML
INJECTION, SOLUTION INTRAMUSCULAR; INTRAVENOUS CODE/TRAUMA/SEDATION MEDICATION
Status: COMPLETED | OUTPATIENT
Start: 2019-01-24 | End: 2019-01-24

## 2019-01-24 RX ORDER — LIDOCAINE HYDROCHLORIDE 10 MG/ML
INJECTION, SOLUTION INFILTRATION; PERINEURAL CODE/TRAUMA/SEDATION MEDICATION
Status: COMPLETED | OUTPATIENT
Start: 2019-01-24 | End: 2019-01-24

## 2019-01-24 RX ORDER — AMLODIPINE BESYLATE 5 MG/1
5 TABLET ORAL ONCE
Status: COMPLETED | OUTPATIENT
Start: 2019-01-24 | End: 2019-01-24

## 2019-01-24 RX ADMIN — HEPARIN SODIUM 5000 UNITS: 5000 INJECTION INTRAVENOUS; SUBCUTANEOUS at 21:47

## 2019-01-24 RX ADMIN — Medication 125 ML/HR: at 12:34

## 2019-01-24 RX ADMIN — FENTANYL CITRATE 50 MCG: 50 INJECTION INTRAMUSCULAR; INTRAVENOUS at 16:17

## 2019-01-24 RX ADMIN — HEPARIN SODIUM 5000 UNITS: 5000 INJECTION INTRAVENOUS; SUBCUTANEOUS at 05:51

## 2019-01-24 RX ADMIN — INSULIN LISPRO 3 UNITS: 100 INJECTION, SOLUTION INTRAVENOUS; SUBCUTANEOUS at 21:48

## 2019-01-24 RX ADMIN — FENTANYL CITRATE 50 MCG: 50 INJECTION INTRAMUSCULAR; INTRAVENOUS at 16:21

## 2019-01-24 RX ADMIN — INSULIN LISPRO 3 UNITS: 100 INJECTION, SOLUTION INTRAVENOUS; SUBCUTANEOUS at 12:33

## 2019-01-24 RX ADMIN — AMLODIPINE BESYLATE 5 MG: 5 TABLET ORAL at 09:03

## 2019-01-24 RX ADMIN — LIDOCAINE HYDROCHLORIDE 10 ML: 10 INJECTION, SOLUTION INFILTRATION; PERINEURAL at 16:18

## 2019-01-24 RX ADMIN — AMLODIPINE BESYLATE 5 MG: 5 TABLET ORAL at 01:08

## 2019-01-24 RX ADMIN — HEPARIN SODIUM 5000 UNITS: 5000 INJECTION INTRAVENOUS; SUBCUTANEOUS at 14:08

## 2019-01-24 RX ADMIN — SODIUM CHLORIDE 100 ML/HR: 9 INJECTION, SOLUTION INTRAVENOUS at 23:46

## 2019-01-24 RX ADMIN — ASPIRIN 81 MG 81 MG: 81 TABLET ORAL at 09:03

## 2019-01-24 NOTE — UTILIZATION REVIEW
Network Utilization Review Department  Phone: [phone]; Fax [phone]  [email or web address]  ATTENTION: Please call with any questions or concerns to [phone]  and carefully listen to the prompts so that you are directed to the right person  Send all requests for admission clinical reviews, approved or denied determinations and any other requests to fax [phone]  All voicemails are confidential     Initial Clinical Review    Admission: Date/Time/Statement: inpatient  1/23/19 @ 1709   Orders Placed This Encounter   Procedures    Inpatient Admission (expected length of stay for this patient is greater than two midnights)     Standing Status:   Standing     Number of Occurrences:   1     Order Specific Question:   Admitting Physician     Answer:   Yony Partida     Order Specific Question:   Level of Care     Answer:   Med Surg [16]     Order Specific Question:   Bed request comments     Answer:   Telemetry     Order Specific Question:   Estimated length of stay     Answer:   More than 2 Midnights     Order Specific Question:   Certification     Answer:   I certify that inpatient services are medically necessary for this patient for a duration of greater than two midnights  See H&P and MD Progress Notes for additional information about the patient's course of treatment  ED: Date/Time/Mode of Arrival:   ED Arrival Information     Expected Arrival Acuity Means of Arrival Escorted By Service Admission Type    - 1/23/2019 13:09 Urgent Walk-In [facility] Medicine Urgent    Arrival Complaint    diarrhea        Chief Complaint:   Chief Complaint   Patient presents with    Diarrhea     diarrhea for the past two weeks  vomited twice last night    Weakness - Generalized     History of Illness: 76 yo m to ED from home due to L abd pain with black watery diarrhea x 2 weeks assoc with weakness  Vomited x 2 the night pta  Does take iron     ED Vital Signs:   ED Triage Vitals [01/23/19 1314]   Temperature Pulse Respirations Blood Pressure SpO2   97 6 °F (36 4 °C) 103 18 (!) 185/79 100 %      Temp Source Heart Rate Source Patient Position - Orthostatic VS BP Location FiO2 (%)   Temporal Monitor Sitting Left arm --      Pain Score       No Pain        Wt Readings from Last 1 Encounters:   01/23/19 70 3 kg (155 lb)     Vital Signs (abnormal): 196/88   178/78   171/78     Pertinent Labs/Diagnostic Test Results:   Na 131   Cl 108   co2 18, 17   A gap 14   Bun 102, 92   Creat 6 89, 5 79   GFR 7, 9      Gluc 327, 158, 205, 111, 138, 306  t prot 6 2     High sens crp 14 3  Wbc 10 6   hgb 11 4, 9 5   hct 34 2, 28 4  UA: +1 bld   4-10 rbc   occ bacteria  Negative occ bld  Enteric bacterial panel negative  CT a&P: Partially calcified mesenteric mass concerning for a malignant lesion, perhaps a carcinoid with numerous low-density liver masses almost certainly metastatic disease  Bilateral hydroureteronephrosis probably secondary to severe bladder wall thickening     Prostatomegaly  Questionable secondary small mass in the left lower quadrant mesentery  Liver US: Bilobar hepatic metastasis    ED Treatment:   Medication Administration from 01/23/2019 1309 to 01/23/2019 1733       Date/Time Order Dose Route Action     01/23/2019 1347 sodium chloride 0 9 % bolus 1,000 mL 1,000 mL Intravenous New Bag     01/23/2019 1400 iohexol (OMNIPAQUE) 240 MG/ML solution 50 mL 50 mL Oral Given          Past Medical History:   Diagnosis Date    Cardiac disease     Diabetes mellitus ([facility] [address] )     Hyperlipidemia     Hypertension      Admitting Diagnosis: Diarrhea [R19 7]  Abdominal mass [R19 00]  Weakness generalized [R53 1]  Acute renal failure (ARF) ([facility] [address] ) [N17 9]  Age/Sex: 77 y o  male  Assessment/Plan: 76 yo m to ED from home admitted as INPT due to abdominal mass , acute renal failure w/hydroureter, weakness, and diarrhea  Started 2 weeks ago with black watery diarrhea and weakness   Vomited x 2 the night pta  +abd tenderness, garcia placed-blood tinged urine  Imaging showed calcified mesenteric mass concerning for malignancy and multiple liver masses-almost certainly mets  bilat hydroureteronephrosis also on imaging  Admission Orders:  Scheduled Meds:   Current Facility-Administered Medications:  acetaminophen 650 mg Oral Q6H PRN   amLODIPine 5 mg Oral Daily   aspirin 81 mg Oral Daily   heparin (porcine) 5,000 Units Subcutaneous Q8H [facility]   insulin lispro 1-5 Units Subcutaneous TID AC   insulin lispro 1-5 Units Subcutaneous HS   ondansetron 4 mg Intravenous Q6H PRN   sodium chloride 100 mL/hr Intravenous Continuous     Cons urology  Cons renal  Cons oncology  Glu cchecks ac / hs  Fall prec  Tele  I/O  scd's  Up w/assist  Liver bx in IR  Urine: random creat, na, microalb/creat ratio  Stool: wbc, o&p, c  Diff  Diabetic diet  Cons PT/OT      Per urology: Urinary retention contributing to renal failure and bilateral hydronephrosis  Continue garcia, needs renal US to follow up  Await oncology for input re: masses on Ct  Per renal: [id number]-gljbyxlexuw and from volume depletion  Metabolic acidosis due to GI loss and renal failure  Needs 1 time bicarb gtt  Then NS for volume expansion  Urology following obstructive uropathy  Oncology to eval masses for malignancy

## 2019-01-24 NOTE — CONSULTS
Consultation - Urology   Vimal Hernandez 77 y o  male MRN: [id number]  Unit/Bed#: -01 Encounter: [id number]      Assessment/Plan      Assessment:  Urinary retention contributing to renal failure and bilateral hydronephrosis  Plan:  Continue Halie catheter  Monitor for postobstructive diuresis  Follow renal function  Recommend follow-up renal ultrasound in the future to ensure resolution of the hydronephrosis with Haile catheter in place  Await oncology input regarding masses seen on CT scan  History of Present Illness   Attending: Margie Overton MD  Reason for Consult / Principal Problem:  Urinary retention and hydronephrosis  HPI: Vimal Hernandez is a 77y o  year old male who presents with left-sided abdominal pain and diarrhea with weakness and vomiting  He also had watery black stools  He states that he underwent colonoscopy followed by upper endoscopy within the past couple of months with findings of reflux  He states that after the endoscopy, he developed left lower quadrant pain which persisted  In the emergency room, CT scan showed mild bilateral hydronephrosis  Haile catheter was already in place  According to the emergency room staff, a Haile catheter was placed for a large volume and he subsequently drained about 1400 cc over the next 2 hr  The patient states that the left lower quadrant abdominal pain resolved soon after that  However, he denies any previous difficulty voiding  He states that he previously was voiding well with good flow  He did have nocturia times 2-3  He denied daytime urinary frequency  He denied dysuria or hematuria  He denied past urological history  The CT also showed a large calcified mesenteric mass concerning for malignancy with several low-density liver masses suggestive of metastatic disease  There was also a small mass in the left lower quadrant mesentery      Consults    Review of Systems   Genitourinary: Negative for difficulty urinating, dysuria, flank pain, frequency and hematuria  Historical Information   Past Medical History:   Diagnosis Date    Cardiac disease     Diabetes mellitus ([facility] [address] )     Hyperlipidemia     Hypertension      History reviewed  No pertinent surgical history  Social History   History   Alcohol Use No     History   Drug Use No     History   Smoking Status    Never Smoker   Smokeless Tobacco    Never Used     Family History: non-contributory    Meds/Allergies   current meds:   Current Facility-Administered Medications   Medication Dose Route Frequency    acetaminophen (TYLENOL) tablet 650 mg  650 mg Oral Q6H PRN    amLODIPine (NORVASC) tablet 5 mg  5 mg Oral Daily    aspirin chewable tablet 81 mg  81 mg Oral Daily    heparin (porcine) subcutaneous injection 5,000 Units  5,000 Units Subcutaneous Q8H [address]    insulin lispro (HumaLOG) 100 units/mL subcutaneous injection 1-5 Units  1-5 Units Subcutaneous TID AC    insulin lispro (HumaLOG) 100 units/mL subcutaneous injection 1-5 Units  1-5 Units Subcutaneous HS    ondansetron (ZOFRAN) injection 4 mg  4 mg Intravenous Q6H PRN     No Known Allergies    Objective   Vitals: Blood pressure 128/63, pulse 83, temperature 98 9 °F (37 2 °C), temperature source Temporal, resp  rate 16, height 5' 9" (1 753 m), weight 70 3 kg (155 lb), SpO2 95 %  I/O last 24 hours: In: 9303 [P O :240; IV Piggyback:2000]  Out: 4100 [Urine:4100]    Invasive Devices     Peripheral Intravenous Line            Peripheral IV 01/23/19 Right Forearm less than 1 day          Drain            Urethral Catheter Straight-tip less than 1 day                Physical Exam   Abdominal: Soft  He exhibits no distension  There is no tenderness  Normal testicles and spermatic cords bilaterally  He does have a subcoronal hypospadias with a dorsal scruggs  Haile catheter is in place draining blood-tinged urine  Prostate is moderately enlarged, smooth, nontender without masses    Lab Results:   CBC:   Lab Results Component Value Date    WBC 9 70 01/24/2019    HGB 9 5 (L) 01/24/2019    HCT 28 4 (L) 01/24/2019    MCV 91 01/24/2019     01/24/2019    MCH 30 3 01/24/2019    MCHC 33 3 01/24/2019    RDW 13 2 01/24/2019    MPV 9 9 01/24/2019    NRBC 0 01/24/2019     CMP:   Lab Results   Component Value Date    SODIUM 139 01/24/2019     (H) 01/24/2019    CO2 17 (L) 01/24/2019    BUN 92 (H) 01/24/2019    CREATININE 5 79 (H) 01/24/2019    CALCIUM 8 6 01/24/2019    AST 9 (L) 01/24/2019    ALT 8 01/24/2019    ALKPHOS 88 01/24/2019    EGFR 9 01/24/2019     Urinalysis:   Lab Results   Component Value Date    COLORU Straw 01/23/2019    CLARITYU Clear 01/23/2019    SPECGRAV 1 020 01/23/2019    PHUR 5 5 01/23/2019    LEUKOCYTESUR Negative 01/23/2019    NITRITE Negative 01/23/2019    GLUCOSEU Negative 01/23/2019    KETONESU Negative 01/23/2019    BILIRUBINUR Negative 01/23/2019    BLOODU 1+ (A) 01/23/2019     Urine Culture: No results found for: URINECX  Imaging Studies: I have personally reviewed pertinent reports  EKG, Pathology, and Other Studies: I have personally reviewed pertinent reports  VTE Prophylaxis: Sequential compression device (Venodyne)     Code Status: Level 1 - Full Code  Advance Directive and Living Will:      Power of :    POLST:      Counseling / Coordination of Care  Total floor / unit time spent today 30 minutes  Greater than 50% of total time was spent with the patient and / or family counseling and / or coordination of care   A description of the counseling / coordination of care:  I have discussed the case with the hospitalist

## 2019-01-24 NOTE — PLAN OF CARE
Problem: DISCHARGE PLANNING - CARE MANAGEMENT  Goal: Discharge to post-acute care or home with appropriate resources  INTERVENTIONS:  - Conduct assessment to determine patient/family and health care team treatment goals, and need for post-acute services based on payer coverage, community resources, and patient preferences, and barriers to discharge  - Address psychosocial, clinical, and financial barriers to discharge as identified in assessment in conjunction with the patient/family and health care team  - Arrange appropriate level of post-acute services according to patient's   needs and preference and payer coverage in collaboration with the physician and health care team  - Communicate with and update the patient/family, physician, and health care team regarding progress on the discharge plan  - Arrange appropriate transportation to post-acute venues  - Patients goal is to return home with family upon discharge   Outcome: Progressing

## 2019-01-24 NOTE — ASSESSMENT & PLAN NOTE
· Appears to be secondary to obstruction related to abdominal mass  · Haile catheter was placed and patient had a significant amount of urine  · Continue Haile  · Urology and nephrology following  · Continue gentle hydration

## 2019-01-24 NOTE — CONSULTS
Consultation - Nephrology   Denita Schulz 77 y o  male MRN: [id number]  Unit/Bed#: -01 Encounter: [id number]      A/P:  1  Acute kidney failure on top of chronic kidney disease   Appears to be multifactorial including obstructive uropathy as well as volume depletion  I will restart the patient's IV fluids starting off with a bicarbonate drip and then transitioned over to normal saline  Check patient's urine studies to confirm although given the patient's obstructive uropathy the fractional excretion of sodium may not be accurate  Further imaging as indicated by Urology  2  Chronic kidney disease stage 2 with baseline creatinine somewhere between 0 9-1 3 mg/dL  3  Possible underlying diabetic nephropathy   Follow up urine microalbumin creatinine ratio to quantify proteinuria  Depending on the amount patient may need to have further evaluation  4  Metabolic acidosis due to GI losses well as acute renal failure   Will give the patient a 1 time dose of 150 mEq of sodium bicarbonate  Reassess the patient's bicarbonate in the morning  Continue volume expansion with normal saline  5  Volume depletion   Continue isotonic saline volume expansion  6  Obstructive uropathy   Agree with Dr Verner Hover the patient may start to experience postobstructive diuresis  Continue with volume expansion as mentioned above with more aggressive saline infusions as indicated  Defer any further medical or procedural focused treatment on prostate to Urology  7  Abdominal mass possibly malignant   Hematology oncology to evaluate patient due to potential likelihood of malignancy  Patient has potential metastatic disease to the liver  8  Bilateral hydroureter        Thank you for allowing us to participate in the care of your patient  Please feel free to contact us regarding the care of this patient, or any other questions/concerns that may be applicable      Patient Active Problem List   Diagnosis    Diarrhea    Weakness generalized    Diabetes mellitus ([facility] [address] )    Hypertension    Hyperlipidemia    Cardiac disease    Acute renal failure superimposed on stage 3 chronic kidney disease (HCC)    Generalized abdominal mass    Hydroureter       History of Present Illness   Physician Requesting Consult: Johana Carrillo MD  Reason for Consult / Principal Problem:  Acute renal failure  Hx and PE limited by:   HPI: Brandie Dietz is a 77y o  year old male who presented to the emergency department on January 23rd with complaints of left-sided abdominal pain  Patient claims that the pain has worsened over last 2 weeks associated with diarrhea during the course of this 2 weeks, however, just prior to presentation emergency department patient suffered his 1st episodes of emesis which led him to coming to the ED  Patient denies any knowledge of percent surrounding wife similar in symptoms, no fevers or chills  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 and had a Haile catheter placed which helped to relieve this issue  Approximately 1 5 L of urine was obtained from the patient at that time  Since then, the patient's creatinine has only improved from approximately 6 9 mg/dL down to 5 7 mg/dL  He currently does not have IV fluids running  The urine itself appears to have blood on gross examination  Further regarding the abdominal pain, patient believes that the pain initiated after he had an endoscopy approximately 5 or 6 months ago  The pain at that time simply did not improve and was often on  Further from the renal standpoint, patient appears to have and unclear baseline creatinine  Back in May of 2018 is creatinine was noted be about 0 8 mg/dL, on repeat in September 2018 patient's creatinine increased up to 1 3 mg/dL, and then the next creatinine available is at presentation to the emergency room    There are no electrolyte abnormalities prior to yesterday's presentation to the ED, however, at this presentation patient was noted to have high normal potassium along with a low-sodium  From the acid-base standpoint, his sodium bicarbonate at presentation was 80 millimole/L and this morning it is 70 millimole/L  Both potassium and sodium have improved this morning status post volume expansion  In addition, the patient appears to have diabetes mellitus type 2 which is only moderately well controlled with hemoglobin A1c around 7 7% from September 2018  No microalbumin available from the past     This time the patient is feeling markedly better, Haile catheter is intact  Abdominal pain persists, but is improved  Patient denies any use of nonsteroidal anti-inflammatory medications  History obtained from chart review and the patient    Review of Systems - Negative except as mentioned above in HPI, more specifics as mentioned below  Review of Systems - General ROS:  Positive for - fatigue  Psychological ROS: negative  Ophthalmic ROS: negative  ENT ROS: negative  Allergy and Immunology ROS: negative  Hematological and Lymphatic ROS: negative  Endocrine ROS: negative  Respiratory ROS: no cough, shortness of breath, or wheezing  Cardiovascular ROS: no chest pain or dyspnea on exertion  Gastrointestinal ROS:  As mentioned above  Genito-Urinary ROS:  As mentioned above  Musculoskeletal ROS: negative  Neurological ROS: no TIA or stroke symptoms  Dermatological ROS: negative    Historical Information   Past Medical History:   Diagnosis Date    Cardiac disease     Diabetes mellitus ([facility] [address] )     Hyperlipidemia     Hypertension      History reviewed  No pertinent surgical history  Social History   History   Alcohol Use No     History   Drug Use No     History   Smoking Status    Never Smoker   Smokeless Tobacco    Never Used     History reviewed  No pertinent family history      Meds/Allergies   all current active meds have been reviewed, current meds: Current Facility-Administered Medications   Medication Dose Route Frequency    acetaminophen (TYLENOL) tablet 650 mg  650 mg Oral Q6H PRN    amLODIPine (NORVASC) tablet 5 mg  5 mg Oral Daily    aspirin chewable tablet 81 mg  81 mg Oral Daily    heparin (porcine) subcutaneous injection 5,000 Units  5,000 Units Subcutaneous Q8H [facility]    insulin lispro (HumaLOG) 100 units/mL subcutaneous injection 1-5 Units  1-5 Units Subcutaneous TID AC    insulin lispro (HumaLOG) 100 units/mL subcutaneous injection 1-5 Units  1-5 Units Subcutaneous HS    ondansetron (ZOFRAN) injection 4 mg  4 mg Intravenous Q6H PRN    and PTA meds:  Prescriptions Prior to Admission   Medication    aspirin 81 MG tablet    glipiZIDE (GLUCOTROL) 5 mg tablet    Red Yeast Rice Extract (RED YEAST RICE PO)    lisinopril (ZESTRIL) 5 mg tablet         No Known Allergies    Objective     Intake/Output Summary (Last 24 hours) at 01/24/19 1029  Last data filed at 01/24/19 0500   Gross per 24 hour   Intake             2240 ml   Output             4100 ml   Net            -1860 ml       Invasive Devices:   Urethral Catheter Straight-tip (Active)   Site Assessment Clean;Skin intact 1/23/2019  3:31 PM   Collection Container Standard drainage bag 1/23/2019  3:31 PM   Securement Method Securing device (Describe) 1/23/2019  3:31 PM   Output (mL) 1500 mL 1/24/2019  5:00 AM       Physical Exam      I/O last 3 completed shifts: In: 2242 [P O :240; IV Piggyback:2000]  Out: 4100 [Urine:4100]    Vitals:    01/24/19 0631   BP: 128/63   Pulse: 83   Resp: 16   Temp: 98 9 °F (37 2 °C)   SpO2: 95%       Gen: in NAD, Alert/Awake  HEENT: no sclerous icterus, MMM, neck supple  CV: +S1/S2, RRR  Lungs: CTA bilaterally  Abd: +BS, soft NT/ND  Ext: all four extremities are warm  Skin: no rashes noted  Neuro: CN II-XII intact    Current Weight: Weight - Scale: 70 3 kg (155 lb)  First Weight: Weight - Scale: 70 3 kg (155 lb)    Lab Results:  I have personally reviewed pertinent labs      CBC: Lab Results   Component Value Date    WBC 9 70 01/24/2019    HGB 9 5 (L) 01/24/2019    HCT 28 4 (L) 01/24/2019    MCV 91 01/24/2019     01/24/2019    MCH 30 3 01/24/2019    MCHC 33 3 01/24/2019    RDW 13 2 01/24/2019    MPV 9 9 01/24/2019    NRBC 0 01/24/2019     CMP: Lab Results   Component Value Date    K 4 8 01/24/2019     (H) 01/24/2019    CO2 17 (L) 01/24/2019    BUN 92 (H) 01/24/2019    CREATININE 5 79 (H) 01/24/2019    CALCIUM 8 6 01/24/2019    AST 9 (L) 01/24/2019    ALT 8 01/24/2019    ALKPHOS 88 01/24/2019    EGFR 9 01/24/2019     Phosphorus: No results found for: PHOS  Magnesium:   Lab Results   Component Value Date    MG 2 5 01/24/2019     Urinalysis: Lab Results   Component Value Date    COLORU Straw 01/23/2019    CLARITYU Clear 01/23/2019    SPECGRAV 1 020 01/23/2019    PHUR 5 5 01/23/2019    LEUKOCYTESUR Negative 01/23/2019    NITRITE Negative 01/23/2019    GLUCOSEU Negative 01/23/2019    KETONESU Negative 01/23/2019    BILIRUBINUR Negative 01/23/2019    BLOODU 1+ (A) 01/23/2019     Ionized Calcium: No results found for: CAION  Coagulation:   Lab Results   Component Value Date    INR 1 08 01/24/2019     Troponin: No results found for: TROPONINI  ABG: No results found for: PHART, JUG2FOI, PO2ART, SIV6OUT, J6SBRUGY, BEART, SOURCE      Results from last 7 days  Lab Units 01/24/19  0455 01/23/19  1342   POTASSIUM mmol/L 4 8 5 3   CHLORIDE mmol/L 108* 99   CO2 mmol/L 17* 18*   BUN mg/dL 92* 102*   CREATININE mg/dL 5 79* 6 89*   CALCIUM mg/dL 8 6 9 2   ALK PHOS U/L 88 109   ALT U/L 8 9   AST U/L 9* 11*       Radiology review:  Procedure: Ct Abdomen Pelvis Wo Contrast    Result Date: 1/23/2019  Narrative: CT ABDOMEN AND PELVIS WITHOUT IV CONTRAST INDICATION:   left sided ab pain, renal failure  COMPARISON:  None  TECHNIQUE:  CT examination of the abdomen and pelvis was performed without intravenous contrast   Axial, sagittal, and coronal 2D reformatted images were created from the source data and submitted for interpretation   Radiation dose length product (DLP) for this visit:  367 8 mGy-cm   This examination, like all CT scans performed in the [facility], was performed utilizing techniques to minimize radiation dose exposure, including the use of iterative reconstruction and automated exposure control  Enteric contrast was administered  FINDINGS: ABDOMEN LOWER CHEST:  Lung bases are clear  Coronary artery calcification noted  LIVER/BILIARY TREE:  There are numerous low density liver masses concerning for metastatic disease  These vary in size from quite small up to about 5 cm  Visualization limited without the benefit of contrast   Overall, the liver is not significantly enlarged  No gross evidence of biliary obstruction  GALLBLADDER:  No calcified gallstones  No pericholecystic inflammatory change  SPLEEN:  Unremarkable  PANCREAS:  Unremarkable  ADRENAL GLANDS:  Unremarkable  KIDNEYS/URETERS:  There is mild bilateral hydroureteronephrosis  No kidney stones are seen  No perirenal fluid  The cause of the obstruction seems to most likely be severe bladder wall thickening  STOMACH AND BOWEL:  There is no bowel obstruction  No bowel wall thickening or acute inflammation  APPENDIX:  No findings to suggest appendicitis  ABDOMINOPELVIC CAVITY:  There is a partially calcified and partially soft tissue density mass in the mesentery which has an irregular lobulated contour  The size is difficult to measure but it is on the order of 7 cm maximum diameter  This might represent carcinoid or desmoid or fibromatosis, however, given the presence of numerous liver lesions, it is probably a malignant lesion  There is questionably a 2nd much smaller lesion in the left lower quadrant adjacent to the sigmoid colon which also  demonstrates mixed density characteristics and is only about 11 x 14 mm  There is no ascites or free air  VESSELS:  Atherosclerotic changes are present  No evidence of aneurysm   PELVIS REPRODUCTIVE ORGANS:  There is prostatomegaly estimated to be 5 8 x 5 5 cm  URINARY BLADDER:  Bladder is collapsed around Haile catheter and appears quite thick walled diffusely  No stones are seen  No obvious inflammation  ABDOMINAL WALL/INGUINAL REGIONS:  Unremarkable  OSSEOUS STRUCTURES:  No acute fracture or destructive osseous lesion  Impression: Partially calcified mesenteric mass concerning for a malignant lesion, perhaps a carcinoid with numerous low-density liver masses almost certainly metastatic disease  Suggest consideration for tissue sampling with biopsy of one of the liver lesions  Bilateral hydroureteronephrosis probably secondary to severe bladder wall thickening  Haile catheter appear satisfactory in position with no significant distention of the bladder  Prostatomegaly  Questionable secondary small mass in the left lower quadrant mesentery   Workstation performed: [id number]         [place] [employer], DO

## 2019-01-24 NOTE — PLAN OF CARE
Problem: Potential for Falls  Goal: Patient will remain free of falls  INTERVENTIONS:  - Assess patient frequently for physical needs  -  Identify cognitive and physical deficits and behaviors that affect risk of falls    -  [place] fall precautions as indicated by assessment   - Educate patient/family on patient safety including physical limitations  - Instruct patient to call for assistance with activity based on assessment  - Modify environment to reduce risk of injury  - Consider OT/PT consult to assist with strengthening/mobility    Outcome: Progressing      Problem: PAIN - ADULT  Goal: Verbalizes/displays adequate comfort level or baseline comfort level  Interventions:  - Encourage patient to monitor pain and request assistance  - Assess pain using appropriate pain scale  - Administer analgesics based on type and severity of pain and evaluate response  - Implement non-pharmacological measures as appropriate and evaluate response  - Consider cultural and social influences on pain and pain management  - Notify physician/advanced practitioner if interventions unsuccessful or patient reports new pain   Outcome: Progressing      Problem: INFECTION - ADULT  Goal: Absence or prevention of progression during hospitalization  INTERVENTIONS:  - Assess and monitor for signs and symptoms of infection  - Monitor lab/diagnostic results  - Monitor all insertion sites, i e  indwelling lines, tubes, and drains  - Monitor endotracheal (as able) and nasal secretions for changes in amount and color  - [place] appropriate cooling/warming therapies per order  - Administer medications as ordered  - Instruct and encourage patient and family to use good hand hygiene technique  - Identify and instruct in appropriate isolation precautions for identified infection/condition   Outcome: Progressing    Goal: Absence of fever/infection during neutropenic period  INTERVENTIONS:  - Monitor WBC  - Implement neutropenic guidelines   Outcome: Progressing      Problem: SAFETY ADULT  Goal: Maintain or return to baseline ADL function  INTERVENTIONS:  -  Assess patient's ability to carry out ADLs; assess patient's baseline for ADL function and identify physical deficits which impact ability to perform ADLs (bathing, care of mouth/teeth, toileting, grooming, dressing, etc )  - Assess/evaluate cause of self-care deficits   - Assess range of motion  - Assess patient's mobility; develop plan if impaired  - Assess patient's need for assistive devices and provide as appropriate  - Encourage maximum independence but intervene and supervise when necessary  ¯ Involve family in performance of ADLs  ¯ Assess for home care needs following discharge   ¯ Request OT consult to assist with ADL evaluation and planning for discharge  ¯ Provide patient education as appropriate   Outcome: Progressing    Goal: Maintain or return mobility status to optimal level  INTERVENTIONS:  - Assess patient's baseline mobility status (ambulation, transfers, stairs, etc )    - Identify cognitive and physical deficits and behaviors that affect mobility  - Identify mobility aids required to assist with transfers and/or ambulation (gait belt, sit-to-stand, lift, walker, cane, etc )  - [place] fall precautions as indicated by assessment  - Record patient progress and toleration of activity level on Mobility SBAR; progress patient to next Phase/Stage  - Instruct patient to call for assistance with activity based on assessment  - Request Rehabilitation consult to assist with strengthening/weightbearing, etc    Outcome: Progressing    Goal: Patient will remain free of falls  INTERVENTIONS:  - Assess patient frequently for physical needs  -  Identify cognitive and physical deficits and behaviors that affect risk of falls    -  [place] fall precautions as indicated by assessment   - Educate patient/family on patient safety including physical limitations  - Instruct patient to call for assistance with activity based on assessment  - Modify environment to reduce risk of injury  - Consider OT/PT consult to assist with strengthening/mobility    Outcome: Progressing      Problem: DISCHARGE PLANNING  Goal: Discharge to home or other facility with appropriate resources  INTERVENTIONS:  - Identify barriers to discharge w/patient and caregiver  - Arrange for needed discharge resources and transportation as appropriate  - Identify discharge learning needs (meds, wound care, etc )  - Arrange for interpretive services to assist at discharge as needed  - Refer to Case Management Department for coordinating discharge planning if the patient needs post-hospital services based on physician/advanced practitioner order or complex needs related to functional status, cognitive ability, or social support system   Outcome: Progressing      Problem: Knowledge Deficit  Goal: Patient/family/caregiver demonstrates understanding of disease process, treatment plan, medications, and discharge instructions  Complete learning assessment and assess knowledge base  Interventions:  - Provide teaching at level of understanding  - Provide teaching via preferred learning methods   Outcome: Progressing      Problem: CARDIOVASCULAR - ADULT  Goal: Maintains optimal cardiac output and hemodynamic stability  INTERVENTIONS:  - Monitor I/O, vital signs and rhythm  - Monitor for S/S and trends of decreased cardiac output i e  bleeding, hypotension  - Administer and titrate ordered vasoactive medications to optimize hemodynamic stability  - Assess quality of pulses, skin color and temperature  - Assess for signs of decreased coronary artery perfusion - ex   Angina  - Instruct patient to report change in severity of symptoms  Outcome: Progressing  TELE MAINTAINED  Goal: Absence of cardiac dysrhythmias or at baseline rhythm  INTERVENTIONS:  - Continuous cardiac monitoring, monitor vital signs, obtain 12 lead EKG if indicated  - Administer antiarrhythmic and heart rate control medications as ordered  - Monitor electrolytes and administer replacement therapy as ordered  Outcome: Progressing      Problem: RESPIRATORY - ADULT  Goal: Achieves optimal ventilation and oxygenation  INTERVENTIONS:  - Assess for changes in respiratory status  - Assess for changes in mentation and behavior  - Position to facilitate oxygenation and minimize respiratory effort  - Oxygen administration by appropriate delivery method based on oxygen saturation (per order) or ABGs  - Initiate smoking cessation education as indicated  - Encourage broncho-pulmonary hygiene including cough, deep breathe, Incentive Spirometry  - Assess the need for suctioning and aspirate as needed  - Assess and instruct to report SOB or any respiratory difficulty  - Respiratory Therapy support as indicated  Outcome: Progressing      Problem: GASTROINTESTINAL - ADULT  Goal: Minimal or absence of nausea and/or vomiting  INTERVENTIONS:  - Administer IV fluids as ordered to ensure adequate hydration  - Maintain NPO status until nausea and vomiting are resolved  - Nasogastric tube as ordered  - Administer ordered antiemetic medications as needed  - Provide nonpharmacologic comfort measures as appropriate  - Advance diet as tolerated, if ordered  - Nutrition services referral to assist patient with adequate nutrition and appropriate food choices  Outcome: Progressing    Goal: Maintains or returns to baseline bowel function  INTERVENTIONS:  - Assess bowel function  - Encourage oral fluids to ensure adequate hydration  - Administer IV fluids as ordered to ensure adequate hydration  - Administer ordered medications as needed  - Encourage mobilization and activity  - Nutrition services referral to assist patient with appropriate food choices  Outcome: Progressing    Goal: Maintains adequate nutritional intake  INTERVENTIONS:  - Monitor percentage of each meal consumed  - Identify factors contributing to decreased intake, treat as appropriate  - Assist with meals as needed  - Monitor I&O, WT and lab values  - Obtain nutrition services referral as needed  Outcome: Progressing    Goal: Establish and maintain optimal ostomy function  INTERVENTIONS:  - Assess bowel function  - Encourage oral fluids to ensure adequate hydration  - Administer IV fluids as ordered to ensure adequate hydration  - Administer ordered medications as needed  - Encourage mobilization and activity  - Nutrition services referral to assist patient with appropriate food choices  - Assess stoma site  Outcome: Completed Date Met: 01/23/19  NO COLOSTOMY    Problem: GENITOURINARY - ADULT  Goal: Maintains or returns to baseline urinary function  INTERVENTIONS:  - Assess urinary function  - Encourage oral fluids to ensure adequate hydration  - Administer IV fluids as ordered to ensure adequate hydration  - Administer ordered medications as needed  - Offer frequent toileting  - Follow urinary retention protocol if ordered  Outcome: Progressing    Goal: Absence of urinary retention  INTERVENTIONS:  - Assess patients ability to void and empty bladder  - Monitor I/O  - Bladder scan as needed  - Discuss with physician/AP medications to alleviate retention as needed  - Discuss catheterization for long term situations as appropriate  Outcome: Progressing    Goal: Urinary catheter remains patent  INTERVENTIONS:  - Assess patency of urinary catheter  - If patient has a chronic garcia, consider changing catheter if non-functioning  - Follow guidelines for intermittent irrigation of non-functioning urinary catheter  Outcome: Progressing      Problem: METABOLIC, FLUID AND ELECTROLYTES - ADULT  Goal: Electrolytes maintained within normal limits  INTERVENTIONS:  - Monitor labs and assess patient for signs and symptoms of electrolyte imbalances  - Administer electrolyte replacement as ordered  - Monitor response to electrolyte replacements, including repeat lab results as appropriate  - Instruct patient on fluid and nutrition as appropriate  Outcome: Progressing    Goal: Fluid balance maintained  INTERVENTIONS:  - Monitor labs and assess for signs and symptoms of volume excess or deficit  - Monitor I/O and WT  - Instruct patient on fluid and nutrition as appropriate  Outcome: Progressing    Goal: Glucose maintained within target range  INTERVENTIONS:  - Monitor Blood Glucose as ordered  - Assess for signs and symptoms of hyperglycemia and hypoglycemia  - Administer ordered medications to maintain glucose within target range  - Assess nutritional intake and initiate nutrition service referral as needed  Outcome: Progressing      Problem: SKIN/TISSUE INTEGRITY - ADULT  Goal: Skin integrity remains intact  INTERVENTIONS  - Identify patients at risk for skin breakdown  - Assess and monitor skin integrity  - Assess and monitor nutrition and hydration status  - Monitor labs (i e  albumin)  - Assess for incontinence   - Turn and reposition patient  - Assist with mobility/ambulation  - Relieve pressure over bony prominences  - Avoid friction and shearing  - Provide appropriate hygiene as needed including keeping skin clean and dry  - Evaluate need for skin moisturizer/barrier cream  - Collaborate with interdisciplinary team (i e  Nutrition, Rehabilitation, etc )   - Patient/family teaching  Outcome: Progressing    Goal: Incision(s), wounds(s) or drain site(s) healing without S/S of infection  INTERVENTIONS  - Assess and document risk factors for skin impairment   - Assess and document dressing, incision, wound bed, drain sites and surrounding tissue  - Initiate Nutrition services consult and/or wound management as needed  Outcome: Progressing    Goal: Oral mucous membranes remain intact  INTERVENTIONS  - Assess oral mucosa and hygiene practices  - Implement preventative oral hygiene regimen  - Implement oral medicated treatments as ordered  - Initiate Nutrition services referral as needed  Outcome: Progressing      Problem: HEMATOLOGIC - ADULT  Goal: Maintains hematologic stability  INTERVENTIONS  - Assess for signs and symptoms of bleeding or hemorrhage  - Monitor labs  - Administer supportive blood products/factors as ordered and appropriate  Outcome: Progressing      Problem: MUSCULOSKELETAL - ADULT  Goal: Maintain or return mobility to safest level of function  INTERVENTIONS:  - Assess patient's ability to carry out ADLs; assess patient's baseline for ADL function and identify physical deficits which impact ability to perform ADLs (bathing, care of mouth/teeth, toileting, grooming, dressing, etc )  - Assess/evaluate cause of self-care deficits   - Assess range of motion  - Assess patient's mobility; develop plan if impaired  - Assess patient's need for assistive devices and provide as appropriate  - Encourage maximum independence but intervene and supervise when necessary  - Involve family in performance of ADLs  - Assess for home care needs following discharge   - Request OT consult to assist with ADL evaluation and planning for discharge  - Provide patient education as appropriate  Outcome: Progressing    Goal: Maintain proper alignment of affected body part  INTERVENTIONS:  - Support, maintain and protect limb and body alignment  - Provide pt/fam with appropriate education  Outcome: Progressing

## 2019-01-24 NOTE — PROGRESS NOTES
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  · Continue supportive care  · Patient will likely need to follow up biopsy results with his PCP as an outpatient     Acute renal failure superimposed on stage 3 chronic kidney disease ([facility] [address] )   Assessment & Plan    · Appears to be secondary to obstruction related to abdominal mass  · Haile catheter was placed and patient had a significant amount of urine  · Continue Haile  · Urology and nephrology following  · Continue gentle hydration     Anemia   Assessment & Plan    · Likely multifactorial  · Check iron panel, folate, B12  · Monitor H&H     Metabolic acidosis   Assessment & Plan    · Continue bicarb drip  · Likely secondary to acute kidney injury  · Nephrology following     Hydroureter   Assessment & Plan    · Likely secondary to obstruction  · Catheter in place and patient is voiding appropriately  · Urology following     Diarrhea   Assessment & Plan    · Follow up stool studies       VTE Pharmacologic Prophylaxis: Pharmacologic: Heparin    Patient Centered Rounds: I have performed bedside rounds with nursing staff today  Discussions with Specialists or Other Care Team Provider: yes  Education and Discussions with Family / Patient: yes    Time Spent for Care: 30 minutes  More than 50% of total time spent on counseling and coordination of care as described above      Current Length of Stay: 1 day(s)    Current Patient Status: Inpatient   Certification Statement: The patient will continue to require additional inpatient hospital stay due to Acute kidney injury    Discharge Plan:  Pending hospital course    Code Status: Level 1 - Full Code    Subjective:   Still with intermittent abdominal pain  Feels better than on admission  Denies any fever or chills  Tolerating diet  Objective:     Vitals:   Temp (24hrs), Av 3 °F (36 8 °C), Min:96 8 °F (36 °C), Max:99 3 °F (37 4 °C)    Temp:  [96 8 °F (36 °C)-99 3 °F (37 4 °C)] 98 4 °F (36 9 °C)  HR:  [61-91] 82  Resp:  [16-18] 18  BP: (128-196)/(60-88) 156/74  SpO2:  [95 %-100 %] 97 %  Body mass index is 22 89 kg/m²  Input and Output Summary (last 24 hours): Intake/Output Summary (Last 24 hours) at 19 1620  Last data filed at 19 1300   Gross per 24 hour   Intake             2960 ml   Output             4100 ml   Net            -1140 ml       Physical Exam:     Physical Exam   Constitutional: No distress  Frail elderly male   HENT:   Head: Normocephalic and atraumatic  Eyes: Conjunctivae and EOM are normal    Neck: Normal range of motion  Neck supple  Cardiovascular: Normal rate and regular rhythm  Pulmonary/Chest: Effort normal  No respiratory distress  Abdominal: Soft  There is tenderness  Musculoskeletal: Normal range of motion  He exhibits no edema  Neurological: He is alert  No cranial nerve deficit  Skin: Skin is warm and dry  Psychiatric: He has a normal mood and affect         Additional Data:     Labs:      Results from last 7 days  Lab Units 19  0455   WBC Thousand/uL 9 70   HEMOGLOBIN g/dL 9 5*   HEMATOCRIT % 28 4*   PLATELETS Thousands/uL 154   NEUTROS PCT % 80*   LYMPHS PCT % 9*   MONOS PCT % 8   EOS PCT % 2       Results from last 7 days  Lab Units 19  0455   POTASSIUM mmol/L 4 8   CHLORIDE mmol/L 108*   CO2 mmol/L 17*   BUN mg/dL 92*   CREATININE mg/dL 5 79*   CALCIUM mg/dL 8 6   ALK PHOS U/L 88   ALT U/L 8   AST U/L 9*       Results from last 7 days  Lab Units 19  0455   INR  1 08       Results from last 7 days  Lab Units 19  1608 01/24/19  1105 01/24/19  0615 01/23/19  2153 01/23/19  1813   POC GLUCOSE mg/dl 208* 306* 138 205* 158*           * I Have Reviewed All Lab Data Listed Above  * Additional Pertinent Lab Tests Reviewed: Laura [address] Admission  Reviewed    Imaging:  Imaging Reports Reviewed Today Include:  Liver ultrasound results appreciated    Recent Cultures (last 7 days):       Results from last 7 days  Lab Units 01/23/19  2204   C DIFF TOXIN B  NEGATIVE for C difficle toxin by PCR  Last 24 Hours Medication List:     Current Facility-Administered Medications:  acetaminophen 650 mg Oral Q6H PRN ARAVIND HarrisonNP   amLODIPine 5 mg Oral Daily Mike Rea PA-C   aspirin 81 mg Oral Daily RIKA Harrison   fentanyl citrate (PF)  Intravenous Code/Trauma/Sedation Jose De Anda MD   heparin (porcine) 5,000 Units Subcutaneous Q8H [address] RIKA Harrison   insulin lispro 1-5 Units Subcutaneous TID AC RIKA Harrison   insulin lispro 1-5 Units Subcutaneous HS RIKA Harrison   lidocaine   Code/Trauma/Sedation Jose De Anda MD   ondansetron 4 mg Intravenous Q6H PRN RIKA Harrison   sodium chloride 100 mL/hr Intravenous Continuous Rutledge Doom, DO        Today, Patient Was Seen By: Solo Trujillo MD    ** Please Note: Dictation voice to text software may have been used in the creation of this document   **

## 2019-01-24 NOTE — DISCHARGE INSTRUCTIONS
Percutaneous Liver Biopsy   WHAT YOU NEED TO KNOW:   A PLB is a procedure to remove a sample of tissue from your liver  The sample can be sent to a lab and tested for liver disease, cancer, or infection  After the procedure you may have pain and bruising at the biopsy site  You may also have pain in your right shoulder  These symptoms should get better in 48 to 72 hours  DISCHARGE INSTRUCTIONS:     [address] and [place] patients,  Contact Interventional Radiology at [phone] PATIENTS: Contact Interventional Radiology at [phone]   [facility] PATIENTS: Contact Interventional Radiology at [phone] if:    · Fever greater than 101 or chills  · You have severe pain in your abdomen  · Your abdomen is larger than usual and feels hard  · Your neck is more swollen and you have trouble swallowing  · You feel weak or dizzy  · Your heart is beating faster than usual    · Your pain does not get better after you take pain medicine  · Your wound is red, swollen, or draining pus  · You have nausea or are vomiting  · Your skin is itchy, swollen, or you have a rash  · You have questions or concerns about your condition or care  Medicines:   · Acetaminophen decreases pain and fever  It is available without a doctor's order  Acetaminophen can cause liver damage if not taken correctly  · Take your home medicine as directed  · Resume your normal diet  Small sips of flat soda will help with mild nausea  Self-care:   · Rest as directed  Do not play sports, exercise, or lift anything heavier than 5 pounds for up to 1 week  · Apply firm, steady pressure if bleeding occurs  A small amount of bleeding from your wound is possible  Apply pressure with a clean gauze or towel for 5 to 10 minutes  Call 911 if bleeding becomes heavy or does not stop  · Ask your healthcare provider when to take your blood thinner or antiplatelet medicine   You may need to wait 24 to 72 hours to take your medicine  This will prevent bleeding  Follow up with your healthcare provider as directed: Write down your questions so you remember to ask them during your visits

## 2019-01-24 NOTE — SOCIAL WORK
CM met with pt and SO Qi Edmonds at bedside and explained role  Pt lives with his SO in a 1 story house; 3 BETH  Pt does not use any assistive device to ambulate  He has cane, walker, BSC and w/c at home  Pt reports he is completely independent with ADL's and continues to drive  He indicates his SO or son will transport him home upon discharge  Pt PCP is Dr Kassandra Escobedo , He uses [facility] in [place]  Pt denies any history of HHC, SNF, MH or D&A treatment  Pt denies the need for any discharge services at this time  CM awaiting PT/OT consults for recommendations  CM will continue to follow  CM reviewed d/c planning process including the following: identifying help at home, patient preference for d/c planning needs, availability of treatment team to discuss questions or concerns patient and/or family may have regarding understanding medications and recognizing signs and symptoms once discharged  CM also encouraged patient to follow up with all recommended appointments after discharge  Patient advised of importance for patient and family to participate in managing patients medical well being

## 2019-01-24 NOTE — ASSESSMENT & PLAN NOTE
· Case discussed with Oncology, who recommend biopsy  · IR consult for biopsy of mesenteric mass  · Concerning for malignancy given what appears to be metastatic disease to liver  · Continue supportive care  · Patient will likely need to follow up biopsy results with his PCP as an outpatient

## 2019-01-24 NOTE — ASSESSMENT & PLAN NOTE
· Likely secondary to obstruction  · Catheter in place and patient is voiding appropriately  · Urology following

## 2019-01-25 VITALS
SYSTOLIC BLOOD PRESSURE: 140 MMHG | HEIGHT: 69 IN | BODY MASS INDEX: 22.96 KG/M2 | DIASTOLIC BLOOD PRESSURE: 78 MMHG | OXYGEN SATURATION: 98 % | HEART RATE: 88 BPM | RESPIRATION RATE: 16 BRPM | TEMPERATURE: 98.8 F | WEIGHT: 155 LBS

## 2019-01-25 PROBLEM — E87.2 METABOLIC ACIDOSIS: Status: RESOLVED | Noted: 2019-01-24 | Resolved: 2019-01-25

## 2019-01-25 LAB
ALBUMIN SERPL BCP-MCNC: 3.4 G/DL (ref 3.5–5.7)
ALP SERPL-CCNC: 76 U/L (ref 55–165)
ALT SERPL W P-5'-P-CCNC: 8 U/L (ref 7–52)
ANION GAP SERPL CALCULATED.3IONS-SCNC: 12 MMOL/L (ref 4–13)
AST SERPL W P-5'-P-CCNC: 8 U/L (ref 13–39)
BASOPHILS # BLD AUTO: 0 THOUSANDS/ΜL (ref 0–0.1)
BASOPHILS NFR BLD AUTO: 1 % (ref 0–2)
BILIRUB SERPL-MCNC: 0.4 MG/DL (ref 0.2–1)
BUN SERPL-MCNC: 70 MG/DL (ref 7–25)
CALCIUM SERPL-MCNC: 8.7 MG/DL (ref 8.6–10.5)
CHLORIDE SERPL-SCNC: 104 MMOL/L (ref 98–107)
CO2 SERPL-SCNC: 24 MMOL/L (ref 21–31)
CREAT SERPL-MCNC: 4.56 MG/DL (ref 0.7–1.3)
EOSINOPHIL # BLD AUTO: 0.2 THOUSAND/ΜL (ref 0–0.61)
EOSINOPHIL NFR BLD AUTO: 2 % (ref 0–5)
ERYTHROCYTE [DISTWIDTH] IN BLOOD BY AUTOMATED COUNT: 13 % (ref 11.5–14.5)
FERRITIN SERPL-MCNC: 377 NG/ML (ref 8–388)
FOLATE SERPL-MCNC: >20 NG/ML (ref 3.1–17.5)
GFR SERPL CREATININE-BSD FRML MDRD: 11 ML/MIN/1.73SQ M
GLUCOSE SERPL-MCNC: 145 MG/DL (ref 65–99)
GLUCOSE SERPL-MCNC: 164 MG/DL (ref 65–140)
GLUCOSE SERPL-MCNC: 253 MG/DL (ref 65–140)
GLUCOSE SERPL-MCNC: 278 MG/DL (ref 65–140)
HCT VFR BLD AUTO: 24.5 % (ref 36.5–49.3)
HGB BLD-MCNC: 8.7 G/DL (ref 14–18)
IRON SATN MFR SERPL: 38 %
IRON SERPL-MCNC: 103 UG/DL (ref 65–175)
LYMPHOCYTES # BLD AUTO: 0.8 THOUSANDS/ΜL (ref 0.6–4.47)
LYMPHOCYTES NFR BLD AUTO: 9 % (ref 21–51)
MCH RBC QN AUTO: 31.5 PG (ref 26–34)
MCHC RBC AUTO-ENTMCNC: 35.4 G/DL (ref 31–37)
MCV RBC AUTO: 89 FL (ref 81–99)
MONOCYTES # BLD AUTO: 1 THOUSAND/ΜL (ref 0.17–1.22)
MONOCYTES NFR BLD AUTO: 11 % (ref 2–12)
NEUTROPHILS # BLD AUTO: 7.3 THOUSANDS/ΜL (ref 1.4–6.5)
NEUTS SEG NFR BLD AUTO: 78 % (ref 42–75)
NRBC BLD AUTO-RTO: 0 /100 WBCS
O+P STL CONC: NORMAL
PLATELET # BLD AUTO: 142 THOUSANDS/UL (ref 149–390)
PMV BLD AUTO: 9.5 FL (ref 8.6–11.7)
POTASSIUM SERPL-SCNC: 4 MMOL/L (ref 3.5–5.5)
PROT SERPL-MCNC: 6.2 G/DL (ref 6.4–8.9)
RBC # BLD AUTO: 2.75 MILLION/UL (ref 4.3–5.9)
SODIUM SERPL-SCNC: 140 MMOL/L (ref 134–143)
TIBC SERPL-MCNC: 273 UG/DL (ref 250–450)
VIT B12 SERPL-MCNC: 2550 PG/ML (ref 100–900)
WBC # BLD AUTO: 9.3 THOUSAND/UL (ref 4.8–10.8)

## 2019-01-25 PROCEDURE — 97530 THERAPEUTIC ACTIVITIES: CPT

## 2019-01-25 PROCEDURE — G8978 MOBILITY CURRENT STATUS: HCPCS

## 2019-01-25 PROCEDURE — 82746 ASSAY OF FOLIC ACID SERUM: CPT | Performed by: INTERNAL MEDICINE

## 2019-01-25 PROCEDURE — 83550 IRON BINDING TEST: CPT | Performed by: INTERNAL MEDICINE

## 2019-01-25 PROCEDURE — 82948 REAGENT STRIP/BLOOD GLUCOSE: CPT

## 2019-01-25 PROCEDURE — 80053 COMPREHEN METABOLIC PANEL: CPT | Performed by: INTERNAL MEDICINE

## 2019-01-25 PROCEDURE — 85025 COMPLETE CBC W/AUTO DIFF WBC: CPT | Performed by: INTERNAL MEDICINE

## 2019-01-25 PROCEDURE — 97162 PT EVAL MOD COMPLEX 30 MIN: CPT

## 2019-01-25 PROCEDURE — 83540 ASSAY OF IRON: CPT | Performed by: INTERNAL MEDICINE

## 2019-01-25 PROCEDURE — G8979 MOBILITY GOAL STATUS: HCPCS

## 2019-01-25 PROCEDURE — 82728 ASSAY OF FERRITIN: CPT | Performed by: INTERNAL MEDICINE

## 2019-01-25 PROCEDURE — 99239 HOSP IP/OBS DSCHRG MGMT >30: CPT | Performed by: INTERNAL MEDICINE

## 2019-01-25 RX ORDER — AMLODIPINE BESYLATE 5 MG/1
5 TABLET ORAL DAILY
Qty: 30 TABLET | Refills: 0 | Status: SHIPPED | OUTPATIENT
Start: 2019-01-26 | End: 2019-05-05 | Stop reason: HOSPADM

## 2019-01-25 RX ORDER — GLIPIZIDE 5 MG/1
2.5 TABLET ORAL
Qty: 30 TABLET | Refills: 0 | Status: SHIPPED | OUTPATIENT
Start: 2019-01-25 | End: 2019-02-07 | Stop reason: HOSPADM

## 2019-01-25 RX ADMIN — INSULIN LISPRO 3 UNITS: 100 INJECTION, SOLUTION INTRAVENOUS; SUBCUTANEOUS at 16:20

## 2019-01-25 RX ADMIN — AMLODIPINE BESYLATE 5 MG: 5 TABLET ORAL at 08:24

## 2019-01-25 RX ADMIN — SODIUM CHLORIDE 100 ML/HR: 9 INJECTION, SOLUTION INTRAVENOUS at 11:12

## 2019-01-25 RX ADMIN — INSULIN LISPRO 1 UNITS: 100 INJECTION, SOLUTION INTRAVENOUS; SUBCUTANEOUS at 07:16

## 2019-01-25 RX ADMIN — HEPARIN SODIUM 5000 UNITS: 5000 INJECTION INTRAVENOUS; SUBCUTANEOUS at 05:59

## 2019-01-25 RX ADMIN — HEPARIN SODIUM 5000 UNITS: 5000 INJECTION INTRAVENOUS; SUBCUTANEOUS at 13:35

## 2019-01-25 RX ADMIN — INSULIN LISPRO 2 UNITS: 100 INJECTION, SOLUTION INTRAVENOUS; SUBCUTANEOUS at 12:20

## 2019-01-25 RX ADMIN — ASPIRIN 81 MG 81 MG: 81 TABLET ORAL at 08:24

## 2019-01-25 NOTE — PLAN OF CARE
Problem: DISCHARGE PLANNING - CARE MANAGEMENT  Goal: Discharge to post-acute care or home with appropriate resources  INTERVENTIONS:  - Conduct assessment to determine patient/family and health care team treatment goals, and need for post-acute services based on payer coverage, community resources, and patient preferences, and barriers to discharge  - Address psychosocial, clinical, and financial barriers to discharge as identified in assessment in conjunction with the patient/family and health care team  - Arrange appropriate level of post-acute services according to patient's   needs and preference and payer coverage in collaboration with the physician and health care team  - Communicate with and update the patient/family, physician, and health care team regarding progress on the discharge plan  - Arrange appropriate transportation to post-acute venues  - Patients goal is to return home with family upon discharge    Outcome: Completed Date Met: 01/25/19

## 2019-01-25 NOTE — CASE MANAGEMENT
Visit with patient and his spouse  Requesting home care if discharged  Attempted to obtain same for patient without result  [facility]: out of area, [facility] [place]: no availability, [facility]: no contract with [facility], [facility]: no availability, [facility]: unable to see patient till 2/2 or 2/3, message left with [facility]  Informed dr Marilu Horton and patient and patient's family re same  Patient spouse is a CNA and feels confident she will be able to care for garcia  Nurse aware and will do teaching with spouse and patient  Patient has labs for Monday, will obtain as outpatient and follow up with Dr Gutierrez Keane  aware to go to Er or Urgent Care if any garcia problem

## 2019-01-25 NOTE — PLAN OF CARE
Problem: PHYSICAL THERAPY ADULT  Goal: Performs mobility at highest level of function for planned discharge setting  See evaluation for individualized goals  Treatment/Interventions: Functional transfer training, Elevations, Therapeutic exercise, Endurance training, Bed mobility, Gait training  Equipment Recommended: Walker       See flowsheet documentation for full assessment, interventions and recommendations  Outcome: Progressing  Prognosis: Good  Problem List: Decreased endurance, Impaired balance, Decreased mobility, Impaired hearing, Decreased safety awareness  Assessment: Pt is 77 y o  male seen for PT evaluation [facility] w/ Generalized abdominal mass  PT consulted to assess pt's functional mobility and d/c needs  Order placed for PT eval and tx, w/ up as tolerated order  Comorbidities affecting pt's physical performance at time of assessment include: diarrhea, DM and htn  PTA, pt was ambulates community distances and elevations, has 3 BETH, lives w/ SO in 1 level house and retired  Personal factors affecting pt at time of IE include: stairs to enter home, inability to navigate community distances, positive fall history and hearing impairments  Please find objective findings from PT assessment regarding body systems outlined above with impairments and limitations including impaired balance, decreased endurance, gait deviations, pain, decreased activity tolerance, decreased functional mobility tolerance, decreased safety awareness and fall risk  The following objective measures performed on IE also reveal limitations: Barthel Index: 50/100  Pt's clinical presentation is currently evolving seen in pt's presentation of pain  Pt to benefit from continued PT tx to address deficits as defined above and maximize level of functional independent mobility and consistency   From PT/mobility standpoint, recommendation at time of d/c would be Home PT pending progress in order to facilitate return to PLOF  Recommendation: Home PT     PT - OK to Discharge: Yes    See flowsheet documentation for full assessment     Radha Davis PT

## 2019-01-25 NOTE — ASSESSMENT & PLAN NOTE
· Patient is status post biopsy  · Follow up with PCP for biopsy results  · Will likely need outpatient Oncology and potentially surgical evaluation

## 2019-01-25 NOTE — PROGRESS NOTES
Progress Note - Nephrology   Carina Reddy 77 y o  male MRN: [id number]  Unit/Bed#: -01 Encounter: [id number]    A/P:  1  Acute kidney failure on top of chronic kidney disease               improving, continue with volume expansion to manage post obstructive diuresis  2  Chronic kidney disease stage 2 with baseline creatinine somewhere between 0 9-1 3 mg/dL  3  Diabetic nephropathy               patient did have significant proteinuria, this will need to be further assessed in the outpatient setting as well as when the patient is in usual state of health  Will be further assessed in the outpatient setting  4  Metabolic acidosis due to GI losses well as acute renal failure               bicarbonate appropriate this morning  5  Volume depletion              Continue isotonic saline volume expansion  6  Obstructive uropathy               continue with care according to recommendations from Dr Ila Murphy  7  Abdominal mass possibly malignant              Hematology oncology to evaluate patient due to potential likelihood of malignancy  Patient has potential metastatic disease to the liver  8  Bilateral hydroureter       Follow up reason for today's visit:  Acute renal failure/chronic kidney disease/diabetic nephropathy/metabolic acidosis    Generalized abdominal mass    Patient Active Problem List   Diagnosis    Diarrhea    Weakness generalized    Diabetes mellitus ([address] )    Hypertension    Hyperlipidemia    Cardiac disease    Acute renal failure superimposed on stage 3 chronic kidney disease (HCC)    Generalized abdominal mass    Hydroureter    Metabolic acidosis    Anemia         Subjective:   Acute events overnight, patient ate 100% of his breakfast    Objective:     Vitals: Blood pressure 122/60, pulse 75, temperature 100 3 °F (37 9 °C), temperature source Temporal, resp  rate 16, height 5' 9" (1 753 m), weight 70 3 kg (155 lb), SpO2 98 %  ,Body mass index is 22 89 kg/m²      Weight (last 2 days) Date/Time   Weight    01/23/19 1314  70 3 (155)                Intake/Output Summary (Last 24 hours) at 01/25/19 0835  Last data filed at 01/25/19 0804   Gross per 24 hour   Intake             1720 ml   Output             2425 ml   Net             -705 ml     I/O last 3 completed shifts: In: 1960 [P O :960; I V :1000]  Out: 4425 [Urine:4425]    Urethral Catheter Straight-tip (Active)   Site Assessment Clean;Skin intact 1/23/2019  3:31 PM   Collection Container Standard drainage bag 1/23/2019  3:31 PM   Securement Method Securing device (Describe) 1/23/2019  3:31 PM   Output (mL) 1500 mL 1/24/2019  5:00 AM       Physical Exam: /60 (BP Location: Right arm)   Pulse 75   Temp 100 3 °F (37 9 °C) (Temporal)   Resp 16   Ht 5' 9" (1 753 m)   Wt 70 3 kg (155 lb)   SpO2 98%   BMI 22 89 kg/m²     General Appearance:    Alert, cooperative, no distress, appears stated age   Head:    Normocephalic, without obvious abnormality, atraumatic   Eyes:    Conjunctiva/corneas clear   Ears:    Normal external ears   Nose:   Nares normal, septum midline, mucosa normal, no drainage    or sinus tenderness   Throat:   Lips, mucosa, and tongue normal; teeth and gums normal   Neck:   Supple   Back:     Symmetric, no curvature, ROM normal, no CVA tenderness   Lungs:     Clear to auscultation bilaterally, respirations unlabored   Chest wall:    No tenderness or deformity   Heart:    Regular rate and rhythm, S1 and S2 normal, no murmur, rub   or gallop   Abdomen:     Soft, non-tender, bowel sounds active   Extremities:   Extremities normal, atraumatic, no cyanosis or edema   Skin:   Skin color, texture, turgor normal, no rashes or lesions   Lymph nodes:   Cervical normal   Neurologic:   CNII-XII intact            Lab, Imaging and other studies: I have personally reviewed pertinent labs    CBC: Lab Results   Component Value Date    WBC 9 30 01/25/2019    HGB 8 7 (L) 01/25/2019    HCT 24 5 (L) 01/25/2019    MCV 89 01/25/2019     (L) 01/25/2019    MCH 31 5 01/25/2019    MCHC 35 4 01/25/2019    RDW 13 0 01/25/2019    MPV 9 5 01/25/2019    NRBC 0 01/25/2019     CMP: Lab Results   Component Value Date    K 4 0 01/25/2019     01/25/2019    CO2 24 01/25/2019    BUN 70 (H) 01/25/2019    CREATININE 4 56 (H) 01/25/2019    CALCIUM 8 7 01/25/2019    AST 8 (L) 01/25/2019    ALT 8 01/25/2019    ALKPHOS 76 01/25/2019    EGFR 11 01/25/2019         Results from last 7 days  Lab Units 01/25/19  0522 01/24/19  0455 01/23/19  1342   POTASSIUM mmol/L 4 0 4 8 5 3   CHLORIDE mmol/L 104 108* 99   CO2 mmol/L 24 17* 18*   BUN mg/dL 70* 92* 102*   CREATININE mg/dL 4 56* 5 79* 6 89*   CALCIUM mg/dL 8 7 8 6 9 2   ALK PHOS U/L 76 88 109   ALT U/L 8 8 9   AST U/L 8* 9* 11*         Phosphorus: No results found for: PHOS  Magnesium: No results found for: MG  Urinalysis: No results found for: COLORU, CLARITYU, SPECGRAV, PHUR, LEUKOCYTESUR, NITRITE, PROTEINUA, GLUCOSEU, KETONESU, BILIRUBINUR, BLOODU  Ionized Calcium: No results found for: CAION  Coagulation: No results found for: PT, INR, APTT  Troponin: No results found for: TROPONINI  ABG: No results found for: PHART, JUV9AIV, PO2ART, WZX7DII, F8LREFJL, BEART, SOURCE  Radiology review:     IMAGING  Procedure: Ct Abdomen Pelvis Wo Contrast    Result Date: 1/23/2019  Narrative: CT ABDOMEN AND PELVIS WITHOUT IV CONTRAST INDICATION:   left sided ab pain, renal failure  COMPARISON:  None  TECHNIQUE:  CT examination of the abdomen and pelvis was performed without intravenous contrast   Axial, sagittal, and coronal 2D reformatted images were created from the source data and submitted for interpretation  Radiation dose length product (DLP) for this visit:  367 8 mGy-cm   This examination, like all CT scans performed in the [facility], was performed utilizing techniques to minimize radiation dose exposure, including the use of iterative reconstruction and automated exposure control   Enteric contrast was administered  FINDINGS: ABDOMEN LOWER CHEST:  Lung bases are clear  Coronary artery calcification noted  LIVER/BILIARY TREE:  There are numerous low density liver masses concerning for metastatic disease  These vary in size from quite small up to about 5 cm  Visualization limited without the benefit of contrast   Overall, the liver is not significantly enlarged  No gross evidence of biliary obstruction  GALLBLADDER:  No calcified gallstones  No pericholecystic inflammatory change  SPLEEN:  Unremarkable  PANCREAS:  Unremarkable  ADRENAL GLANDS:  Unremarkable  KIDNEYS/URETERS:  There is mild bilateral hydroureteronephrosis  No kidney stones are seen  No perirenal fluid  The cause of the obstruction seems to most likely be severe bladder wall thickening  STOMACH AND BOWEL:  There is no bowel obstruction  No bowel wall thickening or acute inflammation  APPENDIX:  No findings to suggest appendicitis  ABDOMINOPELVIC CAVITY:  There is a partially calcified and partially soft tissue density mass in the mesentery which has an irregular lobulated contour  The size is difficult to measure but it is on the order of 7 cm maximum diameter  This might represent carcinoid or desmoid or fibromatosis, however, given the presence of numerous liver lesions, it is probably a malignant lesion  There is questionably a 2nd much smaller lesion in the left lower quadrant adjacent to the sigmoid colon which also  demonstrates mixed density characteristics and is only about 11 x 14 mm  There is no ascites or free air  VESSELS:  Atherosclerotic changes are present  No evidence of aneurysm  PELVIS REPRODUCTIVE ORGANS:  There is prostatomegaly estimated to be 5 8 x 5 5 cm  URINARY BLADDER:  Bladder is collapsed around Haile catheter and appears quite thick walled diffusely  No stones are seen  No obvious inflammation  ABDOMINAL WALL/INGUINAL REGIONS:  Unremarkable   OSSEOUS STRUCTURES:  No acute fracture or destructive osseous lesion  Impression: Partially calcified mesenteric mass concerning for a malignant lesion, perhaps a carcinoid with numerous low-density liver masses almost certainly metastatic disease  Suggest consideration for tissue sampling with biopsy of one of the liver lesions  Bilateral hydroureteronephrosis probably secondary to severe bladder wall thickening  Haile catheter appear satisfactory in position with no significant distention of the bladder  Prostatomegaly  Questionable secondary small mass in the left lower quadrant mesentery  Workstation performed: [id number]     Procedure: Us Liver    Result Date: 1/24/2019  Narrative: LIMITED LIVER ULTRASOUND INDICATION:     Liver mets suspected, initial imaging after primary tumor detected liver masses for possible biopsy  COMPARISON:  Abdominal CT 1/23/2019 TECHNIQUE:   Real-time ultrasound of the liver was performed with a curvilinear transducer with both volumetric sweeps and still imaging techniques  FINDINGS: LIVER: Innumerable bilobar hepatic masses with the largest in the left hepatic lobe segment 2/3 measuring 2 6 x 2 7 x 2 9 cm  Largest mass in the right hepatic lobe measures 4 8 x 4 1 x 4 1 cm  Right hydronephrosis is again noted  Impression: 1  Limited study  2   Bilobar hepatic metastasis as above   Workstation performed: [id number]       Current Facility-Administered Medications   Medication Dose Route Frequency    acetaminophen (TYLENOL) tablet 650 mg  650 mg Oral Q6H PRN    amLODIPine (NORVASC) tablet 5 mg  5 mg Oral Daily    aspirin chewable tablet 81 mg  81 mg Oral Daily    heparin (porcine) subcutaneous injection 5,000 Units  5,000 Units Subcutaneous Q8H [facility]    insulin lispro (HumaLOG) 100 units/mL subcutaneous injection 1-5 Units  1-5 Units Subcutaneous TID AC    insulin lispro (HumaLOG) 100 units/mL subcutaneous injection 1-5 Units  1-5 Units Subcutaneous HS    ondansetron (ZOFRAN) injection 4 mg  4 mg Intravenous Q6H PRN    sodium chloride 0 9 % infusion  100 mL/hr Intravenous Continuous     There are no discontinued medications      Ronal Chong DO

## 2019-01-25 NOTE — NURSING NOTE
Pt discharged home  Vss  Discharge instructions read and explained to pt and wife  All questions answered  IV removed without complications  All belongings with pt  Pt escorted to front entrance via w/c and assisted into car of wife  Haile care education given to wife and pt  All questions answered

## 2019-01-25 NOTE — ASSESSMENT & PLAN NOTE
· Appears to be secondary to obstruction related to abdominal mass  · Haile catheter was placed and patient had a significant amount of urine  · Continue Haile on discharge per Urology  · Follow up with Nephrology and Urology as outpatient

## 2019-01-25 NOTE — DISCHARGE SUMMARY
Discharge- Conception Cecil 1940, 77 y o  male MRN: [id number]    Unit/Bed#: -01 Encounter: [id number]    Primary Care Provider: Philippe Seo DO   Date and time admitted to hospital: 1/23/2019  1:17 PM        * Generalized abdominal mass   Assessment & Plan    · Patient is status post biopsy  · Follow up with PCP for biopsy results  · Will likely need outpatient Oncology and potentially surgical evaluation     Acute renal failure superimposed on stage 3 chronic kidney disease ([facility] [address] )   Assessment & Plan    · Appears to be secondary to obstruction related to abdominal mass  · Haile catheter was placed and patient had a significant amount of urine  · Continue Haile on discharge per Urology  · Follow up with Nephrology and Urology as outpatient     Hypertension   Assessment & Plan    · Lisinopril changed to Norvasc given renal failure  · Follow up with PCP for further management     Diabetes mellitus ([email or web address] )   Assessment & Plan    · Glipizide dose reduced to 2 5 mg b i d  given patient's renal failure  · Follow up with PCP for further management     Diarrhea   Assessment & Plan    · Stool testing negative for infection     Metabolic acidosis-resolved as of 1/25/2019   Assessment & Plan    · Resolved       Abdominal pain was due to tumor  Please see biopsy results        Discharging Physician / Practitioner: Erna Habermann, MD  PCP: Philippe Seo DO  Admission Date:   Admission Orders     Ordered        01/23/19 1709  Inpatient Admission (expected length of stay for this patient is greater than two midnights)  Once             Discharge Date: 01/25/19    Resolved Problems  Date Reviewed: 1/23/2019          Resolved    Metabolic acidosis 3/63/7399     Resolved by  Erna Habermann, MD          Consultations During Hospital Stay:  · Urology, Nephrology, Interventional Radiology    Procedures Performed:   · Liver biopsy    Significant Findings / Test Results:   · Abdominal mass with what appears to be metastatic disease    Incidental Findings:   · None     Test Results Pending at Discharge (will require follow up): · None     Outpatient Tests Requested:  · BMP, CBC on Monday    Complications:  None    Reason for Admission:  Renal failure    Hospital Course:     Denita Schulz is a 77 y o  male patient who originally presented to the hospital on 1/23/2019 due to abdominal pain  Patient was found have bilateral hydroureteronephrosis  Haile catheter was placed and patient had a significant amount of urine output  Urology was consulted recommended continuing Haile catheter  Nephrology was also consulted for acute kidney injury  Patient's creatinine improved after Haile was placed  Patient was continued on IV fluids  Patient was also found to have an abdominal mass on CT of the abdomen pelvis with metastatic appearing lesions in the liver  Interventional Radiology was consulted and liver biopsy was performed  Patient was also having diarrhea for which stool studies were sent and negative for C diff  Patient was also noted to be anemic however stool was negative for occult blood and there was no obvious sign of bleeding  Patient was advised to have follow-up BMP for kidney function on Monday as well as follow-up CBC to check for anemia on Monday as well  I spoke to Dr Darren Butler over the phone regarding admission findings and need for follow-up  He stated that the patient should call the office for an appointment on Monday  Case management was arranging home care for patient's Haile he will be discharged with  Patient should follow up with Urology as an outpatient    Please see above list of diagnoses and related plan for additional information  Condition at Discharge: stable     Discharge Day Visit / Exam:     Subjective:  No complaints at this time  No melena  No hematochezia  No hematemesis  Tolerating diet       Vitals: Blood Pressure: 122/60 (01/25/19 0702)  Pulse: 75 (01/25/19 0702)  Temperature: 100 3 °F (37 9 °C) (01/25/19 0702)  Temp Source: Temporal (01/25/19 6732)  Respirations: 16 (01/25/19 0702)  Height: 5' 9" (175 3 cm) (01/23/19 1314)  Weight - Scale: 70 3 kg (155 lb) (01/23/19 1314)  SpO2: 98 % (01/25/19 0702)     Exam:   Physical Exam   Constitutional: He appears well-developed  No distress  HENT:   Head: Normocephalic and atraumatic  Eyes: Conjunctivae and EOM are normal    Neck: Normal range of motion  Neck supple  Cardiovascular: Normal rate and regular rhythm  Pulmonary/Chest: Effort normal  No respiratory distress  Abdominal: Soft  He exhibits no distension  There is tenderness  Genitourinary:   Genitourinary Comments: Haile catheter in place   Musculoskeletal: Normal range of motion  He exhibits no edema  Neurological: He is alert  No cranial nerve deficit  Skin: Skin is warm and dry  Psychiatric: He has a normal mood and affect  Discussion with Family:  Spoke to family at the bedside    Discharge instructions/Information to patient and family:   See after visit summary for information provided to patient and family  Provisions for Follow-Up Care:  See after visit summary for information related to follow-up care and any pertinent home health orders  Disposition:     Home with [facility]NA Services (Reminder: Complete face to face encounter)    For Discharges to [facility] SN[facility]:   · Not Applicable to this Patient - Not Applicable to this Patient    Planned Readmission: no     Discharge Statement:  I spent 35 minutes discharging the patient  This time was spent on the day of discharge  I had direct contact with the patient on the day of discharge  Greater than 50% of the total time was spent examining patient, answering all patient questions, arranging and discussing plan of care with patient as well as directly providing post-discharge instructions  Additional time then spent on discharge activities      Discharge Medications:  See after visit summary for reconciled discharge medications provided to patient and family        ** Please Note: This note has been constructed using a voice recognition system **

## 2019-01-25 NOTE — PLAN OF CARE
CARDIOVASCULAR - ADULT     Maintains optimal cardiac output and hemodynamic stability Progressing     Absence of cardiac dysrhythmias or at baseline rhythm Progressing        DISCHARGE PLANNING     Discharge to home or other facility with appropriate resources Progressing        DISCHARGE PLANNING - CARE MANAGEMENT     Discharge to post-acute care or home with appropriate resources Progressing        GASTROINTESTINAL - ADULT     Minimal or absence of nausea and/or vomiting Progressing     Maintains or returns to baseline bowel function Progressing     Maintains adequate nutritional intake Progressing        GENITOURINARY - ADULT     Maintains or returns to baseline urinary function Progressing     Absence of urinary retention Progressing     Urinary catheter remains patent Progressing        HEMATOLOGIC - ADULT     Maintains hematologic stability Progressing        INFECTION - ADULT     Absence or prevention of progression during hospitalization Progressing     Absence of fever/infection during neutropenic period Progressing        Knowledge Deficit     Patient/family/caregiver demonstrates understanding of disease process, treatment plan, medications, and discharge instructions Progressing        METABOLIC, FLUID AND ELECTROLYTES - ADULT     Electrolytes maintained within normal limits Progressing     Fluid balance maintained Progressing     Glucose maintained within target range Progressing        MUSCULOSKELETAL - ADULT     Maintain or return mobility to safest level of function Progressing     Maintain proper alignment of affected body part Progressing        Nutrition/Hydration-ADULT     Nutrient/Hydration intake appropriate for improving, restoring or maintaining nutritional needs Progressing        PAIN - ADULT     Verbalizes/displays adequate comfort level or baseline comfort level Progressing        Potential for Falls     Patient will remain free of falls Progressing RESPIRATORY - ADULT     Achieves optimal ventilation and oxygenation Progressing        SAFETY ADULT     Maintain or return to baseline ADL function Progressing     Maintain or return mobility status to optimal level Progressing     Patient will remain free of falls Progressing        SKIN/TISSUE INTEGRITY - ADULT     Skin integrity remains intact Progressing     Incision(s), wounds(s) or drain site(s) healing without S/S of infection Progressing     Oral mucous membranes remain intact Progressing

## 2019-01-25 NOTE — PHYSICAL THERAPY NOTE
Physical Therapy Evaluation     Patient's Name: Laurel Babin    Admitting Diagnosis  Diarrhea [R19 7]  Abdominal mass [R19 00]  Weakness generalized [R53 1]  Acute renal failure (ARF) ([facility] ) [N17 9]    Problem List  Patient Active Problem List   Diagnosis    Diarrhea    Weakness generalized    Diabetes mellitus ([facility] )    Hypertension    Hyperlipidemia    Cardiac disease    Acute renal failure superimposed on stage 3 chronic kidney disease ([facility] )    Generalized abdominal mass    Hydroureter    Anemia       Past Medical History  Past Medical History:   Diagnosis Date    Cardiac disease     Diabetes mellitus ([facility] )     Hyperlipidemia     Hypertension        Past Surgical History  Past Surgical History:   Procedure Laterality Date    IR IMAGE GUIDED BIOPSY/ASPIRATION LIVER  1/24/2019 01/25/19 1423   Note Type   Note type Eval/Treat   Pain Assessment   Pain Assessment 0-10   Pain Score 5   Pain Type Acute pain   Pain Location Abdomen   Pain Orientation Left   Pain Descriptors Aching   Home Living   Type of [address] [place] [address] One level;Stairs to enter with rails  (3 BETH)   Bathroom Shower/Tub Tub/shower unit   Bathroom Toilet Standard   Bathroom Equipment Shower chair; Toilet raiser   Home Equipment Walker;Cane;Wheelchair-manual  [facility])   Prior Function   Level of [place] Independent with ADLs and functional mobility   Lives With Significant other   ADL Assistance Independent   IADLs Independent   Falls in the last 6 months 1 to 4   Vocational Retired   Restrictions/Precautions   [facility] Bearing Precautions Per Order No   Other Precautions Fall Risk;Pain;Multiple lines;Hard of hearing  (urinary catheter)   General   Family/Caregiver Present Yes   Cognition   Overall Cognitive Status WFL   Arousal/Participation Alert   Orientation Level Oriented X4   Memory Within functional limits   Following Commands Follows all commands and directions without difficulty   RLE Assessment   RLE Assessment [facility] LLE Assessment   LLE Assessment WFL   Coordination   Movements are Fluid and Coordinated 1   Sensation WFL   Bed Mobility   Supine to Sit 5  Supervision   Additional items Bedrails   Sit to Supine 5  Supervision   Additional items Bedrails   Transfers   Sit to Stand 4  Minimal assistance   Additional items Armrests; Verbal cues   Stand to Sit 5  Supervision   Ambulation/Elevation   Gait pattern Excessively slow; Short stride;Decreased foot clearance; Improper Weight shift   Gait Assistance 4  Minimal assist   Assistive Device Rolling walker   Distance 50 ft   Balance   Static Sitting Good   Dynamic Sitting Good   Static Standing Fair +   Dynamic Standing Fair -   Ambulatory Fair -   Endurance Deficit   Endurance Deficit Yes   Endurance Deficit Description reported fatigue wtih walk   Activity Tolerance   Activity Tolerance Patient limited by fatigue   Assessment   Prognosis Good   Problem List Decreased endurance; Impaired balance;Decreased mobility; Impaired hearing;Decreased safety awareness   Assessment Pt is 77 y o  male seen for PT evaluation s/p admit to [email or web address] on 1/23/2019 w/ Generalized abdominal mass  PT consulted to assess pt's functional mobility and d/c needs  Order placed for PT eval and tx, w/ up as tolerated order  Comorbidities affecting pt's physical performance at time of assessment include: diarrhea, DM and htn  PTA, pt was ambulates community distances and elevations, has 3 BETH, lives w/ SO in 1 level house and retired  Personal factors affecting pt at time of IE include: stairs to enter home, inability to navigate community distances, positive fall history and hearing impairments   Please find objective findings from PT assessment regarding body systems outlined above with impairments and limitations including impaired balance, decreased endurance, gait deviations, pain, decreased activity tolerance, decreased functional mobility tolerance, decreased safety awareness and fall risk  The following objective measures performed on IE also reveal limitations: Barthel Index: 50/100  Pt's clinical presentation is currently evolving seen in pt's presentation of pain  Pt to benefit from continued PT tx to address deficits as defined above and maximize level of functional independent mobility and consistency  From PT/mobility standpoint, recommendation at time of d/c would be Home PT pending progress in order to facilitate return to PLOF  Goals   Patient Goals to go home today   LTG Expiration Date 02/01/19   Long Term Goal #1 Pt will: Perform bed mobility tasks to modified I to improve ease of bed mobility  Perform transfers to modified I to increase Indep in home environment  Perform ambulation with Mi and RW for 200 ft to  increase Indep in home environment  Increase OOB activity tolerance to 10 minutes without s/s of exertion to decrease fall risk  Increase dynamic standing balance to F to decresse fall risk  Pt will navigate up and down 3 steps with MI so pt can get into his home   Treatment Day 1   Plan   Treatment/Interventions Functional transfer training;Elevations; Therapeutic exercise; Endurance training;Bed mobility;Gait training   PT Frequency 5x/wk   Recommendation   Recommendation Home PT   Equipment Recommended Walker   PT - OK to Discharge Yes   Barthel Index   Feeding 10   Bathing 5   Grooming Score 5   Dressing Score 5   Bladder Score 0   Bowels Score 10   Toilet Use Score 5   Transfers (Bed/Chair) Score 10   Mobility (Level Surface) Score 0   Stairs Score 0   Barthel Index Score 50   Additional treatment provided x 10 minutes  [place] transfer Min A to BSC, Indep sitting balance on toliet  Pt required cues to reach back for [facility]          Paul Medina, PT

## 2019-01-25 NOTE — ASSESSMENT & PLAN NOTE
· Glipizide dose reduced to 2 5 mg b i d  given patient's renal failure  · Follow up with PCP for further management

## 2019-01-27 ENCOUNTER — HOSPITAL ENCOUNTER (OUTPATIENT)
Facility: HOSPITAL | Age: 79
Setting detail: OBSERVATION
Discharge: HOME HEALTH | End: 2019-01-30
Attending: EMERGENCY MEDICINE | Admitting: INTERNAL MEDICINE
Payer: COMMERCIAL

## 2019-01-27 ENCOUNTER — APPOINTMENT (EMERGENCY)
Dept: RADIOLOGY | Facility: HOSPITAL | Age: 79
End: 2019-01-27
Payer: COMMERCIAL

## 2019-01-27 ENCOUNTER — APPOINTMENT (EMERGENCY)
Dept: CT IMAGING | Facility: HOSPITAL | Age: 79
End: 2019-01-27
Payer: COMMERCIAL

## 2019-01-27 DIAGNOSIS — N18.9 CKD (CHRONIC KIDNEY DISEASE): ICD-10-CM

## 2019-01-27 DIAGNOSIS — R55 VASOVAGAL SYNCOPE: ICD-10-CM

## 2019-01-27 DIAGNOSIS — D64.9 ANEMIA: ICD-10-CM

## 2019-01-27 DIAGNOSIS — R55 SYNCOPE: Primary | ICD-10-CM

## 2019-01-27 DIAGNOSIS — R53.1 WEAKNESS GENERALIZED: Chronic | ICD-10-CM

## 2019-01-27 PROBLEM — R16.0 LIVER MASS: Status: ACTIVE | Noted: 2019-01-27

## 2019-01-27 PROBLEM — I10 ACCELERATED HYPERTENSION: Status: ACTIVE | Noted: 2019-01-27

## 2019-01-27 LAB
ALBUMIN SERPL BCP-MCNC: 3.2 G/DL (ref 3.5–5)
ALP SERPL-CCNC: 109 U/L (ref 46–116)
ALT SERPL W P-5'-P-CCNC: 15 U/L (ref 12–78)
ANION GAP SERPL CALCULATED.3IONS-SCNC: 13 MMOL/L (ref 4–13)
APTT PPP: 26 SECONDS (ref 26–38)
AST SERPL W P-5'-P-CCNC: 12 U/L (ref 5–45)
ATRIAL RATE: 90 BPM
BACTERIA UR QL AUTO: ABNORMAL /HPF
BASOPHILS # BLD AUTO: 0.11 THOUSANDS/ΜL (ref 0–0.1)
BASOPHILS NFR BLD AUTO: 1 % (ref 0–1)
BILIRUB DIRECT SERPL-MCNC: 0.1 MG/DL (ref 0–0.2)
BILIRUB SERPL-MCNC: 0.3 MG/DL (ref 0.2–1)
BILIRUB UR QL STRIP: NEGATIVE
BUN SERPL-MCNC: 57 MG/DL (ref 5–25)
CALCIUM SERPL-MCNC: 9.3 MG/DL (ref 8.3–10.1)
CHLORIDE SERPL-SCNC: 102 MMOL/L (ref 100–108)
CLARITY UR: CLEAR
CO2 SERPL-SCNC: 27 MMOL/L (ref 21–32)
COLOR UR: YELLOW
CREAT SERPL-MCNC: 3.59 MG/DL (ref 0.6–1.3)
EOSINOPHIL # BLD AUTO: 0.4 THOUSAND/ΜL (ref 0–0.61)
EOSINOPHIL NFR BLD AUTO: 3 % (ref 0–6)
ERYTHROCYTE [DISTWIDTH] IN BLOOD BY AUTOMATED COUNT: 12.8 % (ref 11.6–15.1)
GFR SERPL CREATININE-BSD FRML MDRD: 15 ML/MIN/1.73SQ M
GLUCOSE SERPL-MCNC: 216 MG/DL (ref 65–140)
GLUCOSE SERPL-MCNC: 383 MG/DL (ref 65–140)
GLUCOSE UR STRIP-MCNC: ABNORMAL MG/DL
HCT VFR BLD AUTO: 30.1 % (ref 36.5–49.3)
HGB BLD-MCNC: 10.1 G/DL (ref 12–17)
HGB UR QL STRIP.AUTO: ABNORMAL
IMM GRANULOCYTES # BLD AUTO: 0.06 THOUSAND/UL (ref 0–0.2)
IMM GRANULOCYTES NFR BLD AUTO: 1 % (ref 0–2)
INR PPP: 1.04 (ref 0.86–1.17)
KETONES UR STRIP-MCNC: NEGATIVE MG/DL
LEUKOCYTE ESTERASE UR QL STRIP: NEGATIVE
LYMPHOCYTES # BLD AUTO: 0.94 THOUSANDS/ΜL (ref 0.6–4.47)
LYMPHOCYTES NFR BLD AUTO: 7 % (ref 14–44)
MCH RBC QN AUTO: 30.2 PG (ref 26.8–34.3)
MCHC RBC AUTO-ENTMCNC: 33.6 G/DL (ref 31.4–37.4)
MCV RBC AUTO: 90 FL (ref 82–98)
MONOCYTES # BLD AUTO: 0.97 THOUSAND/ΜL (ref 0.17–1.22)
MONOCYTES NFR BLD AUTO: 8 % (ref 4–12)
NEUTROPHILS # BLD AUTO: 10.19 THOUSANDS/ΜL (ref 1.85–7.62)
NEUTS SEG NFR BLD AUTO: 80 % (ref 43–75)
NITRITE UR QL STRIP: POSITIVE
NON-SQ EPI CELLS URNS QL MICRO: ABNORMAL /HPF
NRBC BLD AUTO-RTO: 0 /100 WBCS
NT-PROBNP SERPL-MCNC: 753 PG/ML
P AXIS: 72 DEGREES
PH UR STRIP.AUTO: 6 [PH] (ref 4.5–8)
PLATELET # BLD AUTO: 148 THOUSANDS/UL (ref 149–390)
PLATELET # BLD AUTO: 188 THOUSANDS/UL (ref 149–390)
PMV BLD AUTO: 11.3 FL (ref 8.9–12.7)
PMV BLD AUTO: 11.6 FL (ref 8.9–12.7)
POTASSIUM SERPL-SCNC: 4.2 MMOL/L (ref 3.5–5.3)
PR INTERVAL: 156 MS
PROT SERPL-MCNC: 7.5 G/DL (ref 6.4–8.2)
PROT UR STRIP-MCNC: ABNORMAL MG/DL
PROTHROMBIN TIME: 13.5 SECONDS (ref 11.8–14.2)
QRS AXIS: -58 DEGREES
QRSD INTERVAL: 104 MS
QT INTERVAL: 394 MS
QTC INTERVAL: 481 MS
RBC # BLD AUTO: 3.34 MILLION/UL (ref 3.88–5.62)
RBC #/AREA URNS AUTO: ABNORMAL /HPF
SODIUM SERPL-SCNC: 142 MMOL/L (ref 136–145)
SP GR UR STRIP.AUTO: 1.01 (ref 1–1.03)
T WAVE AXIS: 95 DEGREES
TROPONIN I SERPL-MCNC: 0.04 NG/ML
TROPONIN I SERPL-MCNC: 0.06 NG/ML
TROPONIN I SERPL-MCNC: 0.07 NG/ML
TSH SERPL DL<=0.05 MIU/L-ACNC: 0.66 UIU/ML (ref 0.36–3.74)
UROBILINOGEN UR QL STRIP.AUTO: 0.2 E.U./DL
VENTRICULAR RATE: 90 BPM
WBC # BLD AUTO: 12.67 THOUSAND/UL (ref 4.31–10.16)
WBC #/AREA URNS AUTO: ABNORMAL /HPF

## 2019-01-27 PROCEDURE — 83036 HEMOGLOBIN GLYCOSYLATED A1C: CPT | Performed by: INTERNAL MEDICINE

## 2019-01-27 PROCEDURE — 82948 REAGENT STRIP/BLOOD GLUCOSE: CPT

## 2019-01-27 PROCEDURE — 80076 HEPATIC FUNCTION PANEL: CPT | Performed by: PHYSICIAN ASSISTANT

## 2019-01-27 PROCEDURE — 93010 ELECTROCARDIOGRAM REPORT: CPT | Performed by: INTERNAL MEDICINE

## 2019-01-27 PROCEDURE — 85730 THROMBOPLASTIN TIME PARTIAL: CPT | Performed by: PHYSICIAN ASSISTANT

## 2019-01-27 PROCEDURE — 84484 ASSAY OF TROPONIN QUANT: CPT | Performed by: INTERNAL MEDICINE

## 2019-01-27 PROCEDURE — 85049 AUTOMATED PLATELET COUNT: CPT | Performed by: INTERNAL MEDICINE

## 2019-01-27 PROCEDURE — 84484 ASSAY OF TROPONIN QUANT: CPT | Performed by: PHYSICIAN ASSISTANT

## 2019-01-27 PROCEDURE — 36415 COLL VENOUS BLD VENIPUNCTURE: CPT | Performed by: PHYSICIAN ASSISTANT

## 2019-01-27 PROCEDURE — 71046 X-RAY EXAM CHEST 2 VIEWS: CPT

## 2019-01-27 PROCEDURE — 70450 CT HEAD/BRAIN W/O DYE: CPT

## 2019-01-27 PROCEDURE — 85025 COMPLETE CBC W/AUTO DIFF WBC: CPT | Performed by: PHYSICIAN ASSISTANT

## 2019-01-27 PROCEDURE — 99285 EMERGENCY DEPT VISIT HI MDM: CPT

## 2019-01-27 PROCEDURE — 85610 PROTHROMBIN TIME: CPT | Performed by: PHYSICIAN ASSISTANT

## 2019-01-27 PROCEDURE — 80048 BASIC METABOLIC PNL TOTAL CA: CPT | Performed by: PHYSICIAN ASSISTANT

## 2019-01-27 PROCEDURE — 83880 ASSAY OF NATRIURETIC PEPTIDE: CPT | Performed by: PHYSICIAN ASSISTANT

## 2019-01-27 PROCEDURE — 93005 ELECTROCARDIOGRAM TRACING: CPT

## 2019-01-27 PROCEDURE — 81001 URINALYSIS AUTO W/SCOPE: CPT | Performed by: PHYSICIAN ASSISTANT

## 2019-01-27 PROCEDURE — 96360 HYDRATION IV INFUSION INIT: CPT

## 2019-01-27 PROCEDURE — 99220 PR INITIAL OBSERVATION CARE/DAY 70 MINUTES: CPT | Performed by: INTERNAL MEDICINE

## 2019-01-27 PROCEDURE — 84443 ASSAY THYROID STIM HORMONE: CPT | Performed by: INTERNAL MEDICINE

## 2019-01-27 RX ORDER — SODIUM CHLORIDE 9 MG/ML
75 INJECTION, SOLUTION INTRAVENOUS CONTINUOUS
Status: DISPENSED | OUTPATIENT
Start: 2019-01-27 | End: 2019-01-28

## 2019-01-27 RX ORDER — AMLODIPINE BESYLATE 5 MG/1
5 TABLET ORAL ONCE
Status: DISCONTINUED | OUTPATIENT
Start: 2019-01-27 | End: 2019-01-30 | Stop reason: HOSPADM

## 2019-01-27 RX ORDER — ASPIRIN 81 MG/1
81 TABLET, CHEWABLE ORAL DAILY
Status: DISCONTINUED | OUTPATIENT
Start: 2019-01-28 | End: 2019-01-30 | Stop reason: HOSPADM

## 2019-01-27 RX ORDER — AMLODIPINE BESYLATE 5 MG/1
5 TABLET ORAL DAILY
Status: DISCONTINUED | OUTPATIENT
Start: 2019-01-28 | End: 2019-01-30 | Stop reason: HOSPADM

## 2019-01-27 RX ORDER — ACETAMINOPHEN 325 MG/1
650 TABLET ORAL EVERY 6 HOURS PRN
Status: DISCONTINUED | OUTPATIENT
Start: 2019-01-27 | End: 2019-01-30 | Stop reason: HOSPADM

## 2019-01-27 RX ORDER — HEPARIN SODIUM 5000 [USP'U]/ML
5000 INJECTION, SOLUTION INTRAVENOUS; SUBCUTANEOUS EVERY 8 HOURS SCHEDULED
Status: DISCONTINUED | OUTPATIENT
Start: 2019-01-27 | End: 2019-01-30 | Stop reason: HOSPADM

## 2019-01-27 RX ORDER — GLIPIZIDE 5 MG/1
2.5 TABLET ORAL
Status: DISCONTINUED | OUTPATIENT
Start: 2019-01-28 | End: 2019-01-28

## 2019-01-27 RX ADMIN — HEPARIN SODIUM 5000 UNITS: 5000 INJECTION, SOLUTION INTRAVENOUS; SUBCUTANEOUS at 22:26

## 2019-01-27 RX ADMIN — SODIUM CHLORIDE 1000 ML: 0.9 INJECTION, SOLUTION INTRAVENOUS at 12:45

## 2019-01-27 RX ADMIN — INSULIN LISPRO 6 UNITS: 100 INJECTION, SOLUTION INTRAVENOUS; SUBCUTANEOUS at 22:26

## 2019-01-27 RX ADMIN — SODIUM CHLORIDE 75 ML/HR: 0.9 INJECTION, SOLUTION INTRAVENOUS at 21:00

## 2019-01-27 NOTE — ASSESSMENT & PLAN NOTE
From his story, it appears that he passed out-possibly vasovagal as he was bending over to empty his urinary bladder  Symptoms have resolved  He feels weak in general as he has not eaten much  Would watch him overnight  Check orthostatic blood pressure/pulse  Continue most of his home medications  PT/OT evaluation recommend inpatient rehab  Discussed with family    All questions were answered

## 2019-01-27 NOTE — H&P
History and Physical - [facility]    Patient Information: Arpita Burks 77 y o  male MRN: [id number]  Unit/Bed#: ED 22 Encounter: [id number]  Admitting Physician: Natalia Arroyo MD  PCP: Irasema Henry DO  Date of Admission:  01/27/19    Assessment/Plan:      * Vasovagal syncope   Assessment & Plan    From his story, it appears that he passed out-possibly vasovagal as he was bending over to empty his urinary bladder  Symptoms have resolved  He feels weak in general as he has not eaten much  Would watch him overnight  Check orthostatic blood pressure/pulse  Continue most of his home medications  PT/OT evaluation  Discussed with family  All questions were answered     Liver mass   Assessment & Plan    As per patient and family, he was diagnosed having liver mass recently  He had biopsy done and results are pending  Patient follow up on outpatient basis after discharge from the hospital      Accelerated hypertension   Assessment & Plan    Blood pressure on the higher side  Not sure if he took his medications this morning  However, I would watch his blood pressure closely and to make sure that he does not drop his blood pressure significantly on standing up as this could be the cause of his syncope  If he has not had an echocardiogram in the past, would also order one  Anemia   Assessment & Plan    Chronic  Hemoglobin stable  No need for any blood transfusion  Recently, he was in the hospital and also received IV fluids  Partly could be dilutional   Continue to monitor     Acute renal failure superimposed on stage 3 chronic kidney disease ([address] )   Assessment & Plan    Creatinine improving  He has Haile catheter in  Likely has some obstructive issues  Continue to maintain Haile and follow up with his urologist on outpatient basis  Follow-up labs in the morning as well  Consult Nephrology while he is here    Follow up on outpatient basis with his nephrologist   I thing his creatinine would continue to improve slowly     Diabetes mellitus [facility])   Assessment & Plan    Lab Results   Component Value Date    HGBA1C 7 7 (H) 09/27/2018       Recent Labs      01/24/19   1956  01/25/19   0635  01/25/19   1053  01/25/19   1454   POCGLU  348*  164*  253*  278*       Blood Sugar Average: Last 72 hrs:  Check hemoglobin A1c  Sugars have been in 200s at home  He was recently put on glyburide/sulfonylureas  I am concerned about if he had hypoglycemic episode although his sugar as per family was more than 200 when she checked it when he had syncopal episode  I would only continue at once daily glyburide he was taking at home instead of twice a day  Weakness generalized   Assessment & Plan    Multifactorial   Patient recently was hospitalized for acute kidney injury  His renal functions are actually improving  PT/OT         Present on Admission:   Vasovagal syncope   Weakness generalized   Diabetes mellitus ([facility] )   Acute renal failure superimposed on stage 3 chronic kidney disease (HCC)   Accelerated hypertension   Anemia   Liver mass        VTE Prophylaxis: Heparin  / sequential compression device   Code Status:  Full code  POLST: There is no POLST form on file for this patient (pre-hospital)    Anticipated Length of Stay:  Patient will be admitted on an Observation basis with an anticipated length of stay of  less than 2 midnights  Justification for Hospital Stay:  Vasovagal syncope    Total Time for Visit, including Counseling / Coordination of Care: 45 minutes  Greater than 50% of this total time spent on direct patient counseling and coordination of care  Chief Complaint:   Syncopal episode at home    History of Present Illness:    Maria Luisa Singh is a 77 y o  male who presents with syncopal episode at home  He was recently discharged from hospital where he was admitted with acute renal failure  He likely had some obstruction and had a Haile catheter placed    His creatinine was getting better and sent home  At home, he got up and then bent over to changes urinary bag  He started feeling dizzy/lightheaded  He just rolled back on his bed  As per family, his eyes were also rolling up in his head  He feels fine now  He feels weak as he has not eaten anything since this morning  He denies any chest pain  Denies any nausea or vomiting  Denies any abdominal pain  Denies any fever or chills  His sugar was above 200 when he had this syncopal episode as per patient's family  Review of Systems    All systems are reviewed  Positive as per history of presenting illness  Patient answered no to all other questions  Past Medical and Surgical History:     Past Medical History:   Diagnosis Date    Cardiac disease     Diabetes mellitus ([facility] [address] )     Hyperlipidemia     Hypertension        Past Surgical History:   Procedure Laterality Date    IR IMAGE GUIDED BIOPSY/ASPIRATION LIVER  1/24/2019       Meds/Allergies:    Prior to Admission medications    Medication Sig Start Date End Date Taking? Authorizing Provider   amLODIPine (NORVASC) 5 mg tablet Take 1 tablet (5 mg total) by mouth daily 1/26/19   Alfredo Lang MD   aspirin 81 MG tablet Take 81 mg by mouth daily    Historical Provider, MD   glipiZIDE (GLUCOTROL) 5 mg tablet Take 0 5 tablets (2 5 mg total) by mouth 2 (two) times a day before meals 1/25/19   Alfredo Lang MD     Reviewed as documented above    Allergies: No Known Allergies    Social History:     Marital Status:     Occupation:  Not working  Patient Pre-hospital Living Situation:  With Family  Patient Pre-hospital Level of Mobility:  Independent/with help recently because of generalized weakness  Patient Pre-hospital Diet Restrictions:  None  Substance Use History:   History   Alcohol Use No     History   Smoking Status    Never Smoker   Smokeless Tobacco    Never Used     History   Drug Use No       Family History:    Family history is reviewed and is not pertinent to his current illness        Physical Exam      Vitals:   Blood Pressure: (!) 184/86 (01/27/19 1148)  Pulse: 94 (01/27/19 1431)  Temperature: (!) 97 3 °F (36 3 °C) (01/27/19 1148)  Temp Source: Oral (01/27/19 1148)  Respirations: 22 (01/27/19 1431)  Height: 5' 8" (172 7 cm) (01/27/19 1148)  Weight - Scale: 71 1 kg (156 lb 12 oz) (01/27/19 1148)  SpO2: 98 % (01/27/19 1431)    Vital signs are reviewed as above  Constitutional:  Lying in stretcher in the ER  He looks weak to me  Eyes: EOM grossly intact  Conjunctivae are pale  Patient has anicteric sclera  HENT: Oropharynx are is dry  Did not notice any significant lesions on the tongue  Head normocephalic  Neck: Neck is supple  Did not notice any distended neck veins  There is no significant lymphadenopathy  I also did not notice any significant thyromegaly  Cardiac: I did not hear any rubs or gallop  Patient appears to be in sinus rhythm  Respiratory: Patient not in significant respiratory distress  Air entry in general is fair anterolaterally  GI: Abdomen is soft  It is grossly nontender  Bowel sounds are adequate  I was not able to appreciate any hepatosplenomegaly  Neurologic:  Patient is awake and alert  Neurological examination is grossly intact  No obvious focal neurological deficit noticed  Skin: Skin is warm and dry  Skin is also pale  He has Haile catheter in and also has a bag attached to his right thigh  Very much clear urine in the bag  Psychiatric: Mood and affect are pleasant  Musculoskeletal  Patient moving all extremities while lying  Has chronic arthritic joints  Extremities: Patient has no significant cyanosis, clubbing, or lower extremity edema            Additional Data:     Lab Results: I have personally reviewed pertinent reports          Results from last 7 days  Lab Units 01/27/19  1252   WBC Thousand/uL 12 67*   HEMOGLOBIN g/dL 10 1*   HEMATOCRIT % 30 1*   PLATELETS Thousands/uL 188   NEUTROS PCT % 80*   LYMPHS PCT % 7* MONOS PCT % 8   EOS PCT % 3       Results from last 7 days  Lab Units 01/27/19  1252   POTASSIUM mmol/L 4 2   CHLORIDE mmol/L 102   CO2 mmol/L 27   BUN mg/dL 57*   CREATININE mg/dL 3 59*   CALCIUM mg/dL 9 3   ALK PHOS U/L 109   ALT U/L 15   AST U/L 12       Results from last 7 days  Lab Units 01/27/19  1252   INR  1 04       Imaging: I have personally reviewed pertinent reports  Ct Abdomen Pelvis Wo Contrast    Result Date: 1/23/2019  Narrative: CT ABDOMEN AND PELVIS WITHOUT IV CONTRAST INDICATION:   left sided ab pain, renal failure  COMPARISON:  None  TECHNIQUE:  CT examination of the abdomen and pelvis was performed without intravenous contrast   Axial, sagittal, and coronal 2D reformatted images were created from the source data and submitted for interpretation  Radiation dose length product (DLP) for this visit:  367 8 mGy-cm   This examination, like all CT scans performed in the [facility], was performed utilizing techniques to minimize radiation dose exposure, including the use of iterative reconstruction and automated exposure control  Enteric contrast was administered  FINDINGS: ABDOMEN LOWER CHEST:  Lung bases are clear  Coronary artery calcification noted  LIVER/BILIARY TREE:  There are numerous low density liver masses concerning for metastatic disease  These vary in size from quite small up to about 5 cm  Visualization limited without the benefit of contrast   Overall, the liver is not significantly enlarged  No gross evidence of biliary obstruction  GALLBLADDER:  No calcified gallstones  No pericholecystic inflammatory change  SPLEEN:  Unremarkable  PANCREAS:  Unremarkable  ADRENAL GLANDS:  Unremarkable  KIDNEYS/URETERS:  There is mild bilateral hydroureteronephrosis  No kidney stones are seen  No perirenal fluid  The cause of the obstruction seems to most likely be severe bladder wall thickening  STOMACH AND BOWEL:  There is no bowel obstruction    No bowel wall thickening or acute inflammation  APPENDIX:  No findings to suggest appendicitis  ABDOMINOPELVIC CAVITY:  There is a partially calcified and partially soft tissue density mass in the mesentery which has an irregular lobulated contour  The size is difficult to measure but it is on the order of 7 cm maximum diameter  This might represent carcinoid or desmoid or fibromatosis, however, given the presence of numerous liver lesions, it is probably a malignant lesion  There is questionably a 2nd much smaller lesion in the left lower quadrant adjacent to the sigmoid colon which also  demonstrates mixed density characteristics and is only about 11 x 14 mm  There is no ascites or free air  VESSELS:  Atherosclerotic changes are present  No evidence of aneurysm  PELVIS REPRODUCTIVE ORGANS:  There is prostatomegaly estimated to be 5 8 x 5 5 cm  URINARY BLADDER:  Bladder is collapsed around Haile catheter and appears quite thick walled diffusely  No stones are seen  No obvious inflammation  ABDOMINAL WALL/INGUINAL REGIONS:  Unremarkable  OSSEOUS STRUCTURES:  No acute fracture or destructive osseous lesion  Impression: Partially calcified mesenteric mass concerning for a malignant lesion, perhaps a carcinoid with numerous low-density liver masses almost certainly metastatic disease  Suggest consideration for tissue sampling with biopsy of one of the liver lesions  Bilateral hydroureteronephrosis probably secondary to severe bladder wall thickening  Haile catheter appear satisfactory in position with no significant distention of the bladder  Prostatomegaly  Questionable secondary small mass in the left lower quadrant mesentery  Workstation performed: [id number]     Ct Head Without Contrast    Result Date: 1/27/2019  Narrative: CT BRAIN - WITHOUT CONTRAST INDICATION:   syncope  COMPARISON:  None  TECHNIQUE:  CT examination of the brain was performed    In addition to axial images, coronal 2D reformatted images were created and submitted for interpretation  Radiation dose length product (DLP) for this visit:  1037 mGy-cm   This examination, like all CT scans performed in the [place], was performed utilizing techniques to minimize radiation dose exposure, including the use of iterative reconstruction and automated exposure control  IMAGE QUALITY:  Diagnostic  FINDINGS: PARENCHYMA: Decreased attenuation is noted in periventricular and subcortical white matter demonstrating an appearance that is statistically most likely to represent mild microangiopathic change  There is a small chronic lacunar infarct within the left thalamus  There is prominent atherosclerotic calcification of the vertebral arteries and internal carotid arteries  No CT signs of acute infarction  No intracranial mass, mass effect or midline shift  No acute parenchymal hemorrhage  VENTRICLES AND EXTRA-AXIAL SPACES:  Normal for the patient's age  VISUALIZED ORBITS AND PARANASAL SINUSES:  Unremarkable  CALVARIUM AND EXTRACRANIAL SOFT TISSUES:  Normal      Impression: No acute intracranial abnormality  Chronic findings as mentioned Workstation performed: [id number]     Us Liver    Result Date: 1/24/2019  Narrative: LIMITED LIVER ULTRASOUND INDICATION:     Liver mets suspected, initial imaging after primary tumor detected liver masses for possible biopsy  COMPARISON:  Abdominal CT 1/23/2019 TECHNIQUE:   Real-time ultrasound of the liver was performed with a curvilinear transducer with both volumetric sweeps and still imaging techniques  FINDINGS: LIVER: Innumerable bilobar hepatic masses with the largest in the left hepatic lobe segment 2/3 measuring 2 6 x 2 7 x 2 9 cm  Largest mass in the right hepatic lobe measures 4 8 x 4 1 x 4 1 cm  Right hydronephrosis is again noted  Impression: 1  Limited study  2   Bilobar hepatic metastasis as above   Workstation performed: [id number]     Ir Image Guided Biopsy/aspiration Liver    Result Date: 1/25/2019  Narrative: Examination: Ultrasound-guided biopsy of the liver HISTORY: Mass at the root of the mesentery with calcifications  Multiple liver masses  Suspicious for carcinoid TECHNIQUE: The patient was brought to Radiology  Informed consent was obtained  The right costal margin was prepped and draped in usual sterile fashion  Timeout was performed  Lidocaine was administered as local anesthesia  Using ultrasound guidance a 17-gauge needle was advanced to the targeted lesion  An 18-gauge needle was placed coaxially, and core biopsy was performed  The tract was closed with D stat  FINDINGS: Large hyperechoic lesions  This is atypical for metastatic disease  The cores were read in color, more typical for liver than tumor  Impression: Technically successful ultrasound-guided biopsy This is then of the clinically relevant portion of this report  Subsequent information is for compliance, documentation, and coding purposes  In the process of informed consent, information was communicated, verbally, to the patient regarding the procedure  The patient was informed of; the name of the procedure, nature of the procedure, nature of the condition, risks, benefits, alternatives, and potential complications, as well as the consequences of not having the procedure  All the patient's questions were answered  Informed consent was signed  Chlorhexidine and alcohol was used for cleansing and sterile preparation of the skin  This was allowed to dry prior to the application of sterile draping  Timeout was performed, with all team members present, and in agreement  Confirmation of patient, procedure, laterally, allergies, and all needed equipment was performed   PROCEDURE: Ultrasound-guided biopsy of the above-named organ or abnormality Preprocedure diagnosis: Please see "history ", above Postprocedure diagnosis: Same Antibiotics: None Estimated blood loss: less than 5 mL Specimen: Core biopsy submitted in formalin Anesthesia: Local and monitored intravenous conscious sedation The patient was examined, and is in satisfactory condition for planned moderate sedation  Mallampati score: 2 Intravenous conscious sedation was provided  There was continuous monitoring of blood pressure, heart rate, respiratory rate, and oxygen saturation by an independent, trained observer  Conscious sedation time: 50 minutes Versed dose: 0 Milligrams Fentanyl dose: 100 Micrograms After the procedure, the patient was recovered, and return to their baseline status, and was deemed fit for discharge from IR  Workstation performed: [id number]       EKG, Pathology, and Other Studies Reviewed on Admission:   · EKG:  Sinus rhythm  Do not see any acute ischemic changes  Allscripts Records Reviewed: No     ** Please Note: Dragon 360 Dictation voice to text software may have been used in the creation of this document   **

## 2019-01-27 NOTE — ASSESSMENT & PLAN NOTE
Chronic  Hemoglobin stable  No need for any blood transfusion  Recently, he was in the hospital and also received IV fluids    Partly could be dilutional   Continue to monitor

## 2019-01-27 NOTE — ASSESSMENT & PLAN NOTE
Lab Results   Component Value Date    HGBA1C 7 7 (H) 09/27/2018       Recent Labs      01/24/19   1956  01/25/19   0635  01/25/19   1053  01/25/19   1454   POCGLU  348*  164*  253*  278*       Blood Sugar Average: Last 72 hrs:  Check hemoglobin A1c  Sugars have been in 200s at home  He was recently put on glyburide/sulfonylureas  I am concerned about if he had hypoglycemic episode although his sugar as per family was more than 200 when she checked it when he had syncopal episode  I would only continue at once daily glyburide he was taking at home instead of twice a day

## 2019-01-27 NOTE — ASSESSMENT & PLAN NOTE
Blood pressure on the higher side  Not sure if he took his medications this morning  However, I would watch his blood pressure closely and to make sure that he does not drop his blood pressure significantly on standing up as this could be the cause of his syncope  If he has not had an echocardiogram in the past, would also order one

## 2019-01-27 NOTE — ED PROVIDER NOTES
History  Chief Complaint   Patient presents with    Syncope     possible syncopal episode prior to arrival  Pt reports standing up, becoming dizzy and nauseas  wife states he fell back and his eyes rolled back  pt denies nausea and dizziness durimng triage  40-year-old male with past medical history significant for coronary artery disease, hyperlipidemia, hypertension and diabetes with chronic kidney disease presents to the emergency department with chief complaint of syncope  Onset of symptoms reported as just prior to arrival   Location of symptoms reported as the head  Quality is described as patient feeling dizzy and nauseous and passing out from standing while at home  This was witnessed by his wife  She states that he fell back and his eyes rolled back in his head  Symptoms persisted for approximately 1 minute before spontaneously resolving  Patient does not remember the episode fully  He does remember feeling like he might pass out prior to its occurrence  Currently patient reports feeling well, no other symptoms  Associated symptoms:  Denies chest pain  Denies headache  Denies vomiting or diarrhea  Denies fevers or chills  Modifying factors: Wife reports passage of time cause symptoms to spontaneously resolved  Context:  Patient was recently hospitalized for acute kidney injury on chronic kidney disease  Some of his medications were ranged including his glipizide and amlodipine  He was discontinued from lisinopril  Unsure if this is contributing to symptoms  Patient also had workup for some spots on his liver during his hospitalization  Results unknown at this time  Reviewed past visits and recent admission to [facility] on 1/23/2019 for treatment of acute kidney injury on CKD  History provided by:  Patient and spouse   used: No        Prior to Admission Medications   Prescriptions Last Dose Informant Patient Reported? Taking?    amLODIPine (NORVASC) 5 mg tablet 1/26/2019  No No   Sig: Take 1 tablet (5 mg total) by mouth daily   aspirin 81 MG tablet 1/26/2019  Yes No   Sig: Take 81 mg by mouth daily   glipiZIDE (GLUCOTROL) 5 mg tablet 1/26/2019  No No   Sig: Take 0 5 tablets (2 5 mg total) by mouth 2 (two) times a day before meals      Facility-Administered Medications: None       Past Medical History:   Diagnosis Date    Cardiac disease     Diabetes mellitus ([address] )     Hyperlipidemia     Hypertension        Past Surgical History:   Procedure Laterality Date    IR IMAGE GUIDED BIOPSY/ASPIRATION LIVER  1/24/2019       History reviewed  No pertinent family history  I have reviewed and agree with the history as documented  Social History   Substance Use Topics    Smoking status: Never Smoker    Smokeless tobacco: Never Used    Alcohol use No        Review of Systems   Constitutional: Negative for activity change, appetite change, chills, diaphoresis, fatigue, fever and unexpected weight change  HENT: Negative for congestion, dental problem, drooling, ear discharge, ear pain, facial swelling, hearing loss, mouth sores, nosebleeds, postnasal drip, rhinorrhea, sinus pain, sinus pressure, sneezing, sore throat, tinnitus, trouble swallowing and voice change  Eyes: Negative for photophobia, pain, discharge, redness, itching and visual disturbance  Respiratory: Negative for apnea, cough, choking, chest tightness, shortness of breath, wheezing and stridor  Cardiovascular: Negative for chest pain, palpitations and leg swelling  Gastrointestinal: Positive for nausea  Negative for abdominal distention, abdominal pain, anal bleeding, blood in stool, constipation, diarrhea and vomiting  Endocrine: Negative for cold intolerance, heat intolerance, polydipsia, polyphagia and polyuria  Genitourinary: Negative for decreased urine volume, difficulty urinating, dysuria, flank pain, frequency, hematuria and urgency     Musculoskeletal: Negative for arthralgias, back pain, gait problem, joint swelling, myalgias, neck pain and neck stiffness  Skin: Negative for color change, pallor, rash and wound  Allergic/Immunologic: Negative for environmental allergies, food allergies and immunocompromised state  Neurological: Positive for syncope  Negative for dizziness, tremors, seizures, facial asymmetry, speech difficulty, weakness, light-headedness, numbness and headaches  Hematological: Negative for adenopathy  Does not bruise/bleed easily  Psychiatric/Behavioral: Negative for agitation, confusion and hallucinations  The patient is not nervous/anxious  All other systems reviewed and are negative  Physical Exam  Physical Exam   Constitutional: He is oriented to person, place, and time  He appears well-developed and well-nourished  No distress  /65 (BP Location: Left arm)   Pulse 84   Temp 98 3 °F (36 8 °C) (Oral)   Resp 16   Ht 5' 8" (1 727 m)   Wt 69 2 kg (152 lb 8 9 oz)   SpO2 98%   BMI 23 20 kg/m²    HENT:   Head: Normocephalic and atraumatic  Right Ear: External ear normal    Left Ear: External ear normal    Nose: Nose normal    Mouth/Throat: Oropharynx is clear and moist  No oropharyngeal exudate  Eyes: Pupils are equal, round, and reactive to light  Conjunctivae and EOM are normal  Right eye exhibits no discharge  Left eye exhibits no discharge  No scleral icterus  Neck: Normal range of motion  Neck supple  No JVD present  No tracheal deviation present  No thyromegaly present  Cardiovascular: Normal rate, regular rhythm and intact distal pulses  Pulmonary/Chest: Effort normal and breath sounds normal  No stridor  No respiratory distress  He has no wheezes  He has no rales  He exhibits no tenderness  Abdominal: Soft  Bowel sounds are normal  He exhibits no distension and no mass  There is no tenderness  There is no rebound and no guarding  No hernia  Musculoskeletal: Normal range of motion   He exhibits no edema, tenderness or deformity  Lymphadenopathy:     He has no cervical adenopathy  Neurological: He is alert and oriented to person, place, and time  He displays normal reflexes  No cranial nerve deficit or sensory deficit  He exhibits normal muscle tone  Coordination normal    Skin: Skin is warm and dry  Capillary refill takes less than 2 seconds  No rash noted  He is not diaphoretic  No erythema  No pallor  Psychiatric: He has a normal mood and affect  His behavior is normal  Judgment and thought content normal    Nursing note and vitals reviewed        Vital Signs  ED Triage Vitals [01/27/19 1148]   Temperature Pulse Respirations Blood Pressure SpO2   (!) 97 3 °F (36 3 °C) 74 17 (!) 184/86 98 %      Temp Source Heart Rate Source Patient Position - Orthostatic VS BP Location FiO2 (%)   Oral Monitor Sitting Right arm --      Pain Score       No Pain           Vitals:    01/28/19 1605 01/28/19 1857 01/28/19 2300 01/29/19 0700   BP: 162/80 154/70 138/83 139/65   Pulse: 55 89 79 84   Patient Position - Orthostatic VS: Lying Lying Lying Lying       Visual Acuity      ED Medications  Medications   amLODIPine (NORVASC) tablet 5 mg (not administered)   amLODIPine (NORVASC) tablet 5 mg (5 mg Oral Given 1/28/19 0912)   aspirin chewable tablet 81 mg (81 mg Oral Given 1/28/19 0913)   sodium chloride 0 9 % infusion (0 mL/hr Intravenous Stopped 1/28/19 2235)   heparin (porcine) subcutaneous injection 5,000 Units (5,000 Units Subcutaneous Given 1/29/19 0526)   insulin lispro (HumaLOG) 100 units/mL subcutaneous injection 1-6 Units (6 Units Subcutaneous Given 1/28/19 1722)   acetaminophen (TYLENOL) tablet 650 mg (not administered)   insulin lispro (HumaLOG) 100 units/mL subcutaneous injection 1-6 Units (4 Units Subcutaneous Given 1/28/19 2227)   sodium chloride 0 9 % bolus 1,000 mL (0 mL Intravenous Stopped 1/27/19 1432)       Diagnostic Studies  Results Reviewed     Procedure Component Value Units Date/Time    Troponin I [[id number]]  (Abnormal) Collected:  01/27/19 2015    Lab Status:  Final result Specimen:  Blood from Hand, Right Updated:  01/27/19 2053     Troponin I 0 07 (H) ng/mL     Troponin I [[id number]]  (Abnormal) Collected:  01/27/19 1815    Lab Status:  Final result Specimen:  Blood from Arm, Right Updated:  01/27/19 1856     Troponin I 0 06 (H) ng/mL     Urine Microscopic [[id number]]  (Abnormal) Collected:  01/27/19 1810    Lab Status:  Final result Specimen:  Urine from Urine, Other Updated:  01/27/19 1855     RBC, UA 2-4 (A) /hpf      WBC, UA 1-2 (A) /hpf      Epithelial Cells Occasional /hpf      Bacteria, UA Moderate (A) /hpf     UA w Reflex to Microscopic w Reflex to Culture [[id number]]  (Abnormal) Collected:  01/27/19 1810    Lab Status:  Final result Specimen:  Urine from Urine, Other Updated:  01/27/19 1849     Color, UA Yellow     Clarity, UA Clear     Specific Gravity, UA 1 015     pH, UA 6 0     Leukocytes, UA Negative     Nitrite, UA Positive (A)     Protein, UA 30 (1+) (A) mg/dl      Glucose,  (1/2%) (A) mg/dl      Ketones, UA Negative mg/dl      Urobilinogen, UA 0 2 E U /dl      Bilirubin, UA Negative     Blood, UA Large (A)    Hepatic function panel [[id number]]  (Abnormal) Collected:  01/27/19 1252    Lab Status:  Final result Specimen:  Blood from Arm, Right Updated:  01/27/19 1340     Total Bilirubin 0 30 mg/dL      Bilirubin, Direct 0 10 mg/dL      Alkaline Phosphatase 109 U/L      AST 12 U/L      ALT 15 U/L      Total Protein 7 5 g/dL      Albumin 3 2 (L) g/dL     B-type natriuretic peptide [[id number]]  (Abnormal) Collected:  01/27/19 1252    Lab Status:  Final result Specimen:  Blood from Arm, Right Updated:  01/27/19 1340     NT-proBNP 753 (H) pg/mL     Basic metabolic panel [[id number]]  (Abnormal) Collected:  01/27/19 1252    Lab Status:  Final result Specimen:  Blood from Arm, Right Updated:  01/27/19 1333     Sodium 142 mmol/L      Potassium 4 2 mmol/L      Chloride 102 mmol/L      CO2 27 mmol/L      ANION GAP 13 mmol/L      BUN 57 (H) mg/dL      Creatinine 3 59 (H) mg/dL      Glucose 216 (H) mg/dL      Calcium 9 3 mg/dL      eGFR 15 ml/min/1 73sq m     Narrative:         National Kidney Disease Education Program recommendations are as follows:  GFR calculation is accurate only with a steady state creatinine  Chronic Kidney disease less than 60 ml/min/1 73 sq  meters  Kidney failure less than 15 ml/min/1 73 sq  meters      Troponin I [[id number]]  (Normal) Collected:  01/27/19 1252    Lab Status:  Final result Specimen:  Blood from Arm, Right Updated:  01/27/19 1326     Troponin I 0 04 ng/mL     CBC and differential [[id number]]  (Abnormal) Collected:  01/27/19 1252    Lab Status:  Final result Specimen:  Blood from Arm, Right Updated:  01/27/19 1322     WBC 12 67 (H) Thousand/uL      RBC 3 34 (L) Million/uL      Hemoglobin 10 1 (L) g/dL      Hematocrit 30 1 (L) %      MCV 90 fL      MCH 30 2 pg      MCHC 33 6 g/dL      RDW 12 8 %      MPV 11 6 fL      Platelets 527 Thousands/uL      nRBC 0 /100 WBCs      Neutrophils Relative 80 (H) %      Immat GRANS % 1 %      Lymphocytes Relative 7 (L) %      Monocytes Relative 8 %      Eosinophils Relative 3 %      Basophils Relative 1 %      Neutrophils Absolute 10 19 (H) Thousands/µL      Immature Grans Absolute 0 06 Thousand/uL      Lymphocytes Absolute 0 94 Thousands/µL      Monocytes Absolute 0 97 Thousand/µL      Eosinophils Absolute 0 40 Thousand/µL      Basophils Absolute 0 11 (H) Thousands/µL     APTT [[id number]]  (Normal) Collected:  01/27/19 1252    Lab Status:  Final result Specimen:  Blood from Arm, Right Updated:  01/27/19 1322     PTT 26 seconds     Protime-INR [[id number]]  (Normal) Collected:  01/27/19 1252    Lab Status:  Final result Specimen:  Blood from Arm, Right Updated:  01/27/19 1320     Protime 13 5 seconds      INR 1 04                 XR chest 2 views   Final Result by Romana Dickens MD (01/28 2158)      No radiographic evidence of acute intrathoracic process  Workstation performed: [id number]         CT head without contrast   Final Result by Rudi Litten, MD (01/27 2601)      No acute intracranial abnormality  Chronic findings as mentioned                  Workstation performed: [id number]                    Procedures  ECG 12 Lead Documentation  Date/Time: 1/27/2019 11:54 AM  Performed by: Nayeli Valerio  Authorized by: Nayeli Valerio     Indications / Diagnosis:  Syncope  ECG reviewed by me, the ED Provider: yes    Patient location:  ED  Previous ECG:     Previous ECG:  Unavailable    Comparison to cardiac monitor: Yes    Interpretation:     Interpretation: normal    Rate:     ECG rate:  90    ECG rate assessment: normal    Rhythm:     Rhythm: sinus rhythm    Ectopy:     Ectopy: PAC and PVCs    QRS:     QRS axis:  Left    QRS intervals:  Normal  Conduction:     Conduction: normal    ST segments:     ST segments:  Normal  T waves:     T waves: normal    Other findings:     Other findings: LVH             Phone Contacts  ED Phone Contact    ED Course                               MDM  Number of Diagnoses or Management Options  Anemia: new and requires workup  CKD (chronic kidney disease): new and requires workup  Syncope: new and requires workup  Diagnosis management comments: Differential diagnosis includes but is not limited to cardiac arrhythmia, dehydration, worsening kidney failure, electrolyte abnormality, dehydration, urinary tract infection, sepsis, acute coronary syndrome, pulmonary embolism, planned workup for syncope and likely admission  Lab results reviewed  Basic metabolic panel remarkable for elevated BUN at 57 and elevated creatinine at 3 5  This is improved from previous levels up to 5 and 6 creatinine  Potassium normal at 4 2  CBC demonstrates elevated white blood cell count 12  6  Hemoglobin of 10 1 and hematocrit of 30 1 are low  MCV is normal   INR is normal at 1 0  No coagulopathy    BNP is elevated at 753  Troponin is normal at 0 04  Chest x-ray images independently visualized and interpreted by me demonstrate no acute infiltrate or pneumothorax  CT scan of the head images independently visualized by me  Radiology report was reviewed:  PARENCHYMA: Decreased attenuation is noted in periventricular and subcortical white matter demonstrating an appearance that is statistically most likely to represent mild microangiopathic change  Henley Anger is a small chronic lacunar infarct within the left   thalamus   There is prominent atherosclerotic calcification of the vertebral arteries and internal carotid arteries  No CT signs of acute infarction   No intracranial mass, mass effect or midline shift   No acute parenchymal hemorrhage  VENTRICLES AND EXTRA-AXIAL SPACES:  Normal for the patient's age  VISUALIZED ORBITS AND PARANASAL SINUSES:  Unremarkable  CALVARIUM AND EXTRACRANIAL SOFT TISSUES:  Normal     I reviewed all test results with the patient and his wife at bedside  Given syncope with no identified source at this time will admit for further evaluation of source of syncope  Still to be evaluated include cardiac arrhythmia versus metabolic source of symptoms  Discussed with patient agrees with admission    Case discussed with Dr Levar Izaguirre hospitalist regarding admission            Amount and/or Complexity of Data Reviewed  Clinical lab tests: ordered and reviewed  Tests in the radiology section of CPT®: ordered and reviewed  Tests in the medicine section of CPT®: ordered and reviewed  Discussion of test results with the performing providers: yes  Obtain history from someone other than the patient: yes  Review and summarize past medical records: yes  Discuss the patient with other providers: yes  Independent visualization of images, tracings, or specimens: yes    Patient Progress  Patient progress: improved    CritCare Time    Disposition  Final diagnoses:   Syncope   CKD (chronic kidney disease)   Anemia     Time reflects when diagnosis was documented in both MDM as applicable and the Disposition within this note     Time User Action Codes Description Comment    1/27/2019  2:09 PM Yuma Chris Add [R55] Syncope     1/27/2019  2:09 PM Yuma Chris Add [N18 9] CKD (chronic kidney disease)     1/27/2019  2:09 PM Yuma Chris Add [D64 9] Anemia     1/28/2019 10:45 PM Arpita Candy Add [R53 1] Weakness generalized     1/28/2019 10:45 PM Arpita Candy Add [R55] Vasovagal syncope       ED Disposition     ED Disposition Condition Date/Time Comment    Admit  Sun Jan 27, 2019  2:09 PM Case was discussed with Dr Alexys Rojas and the patient's admission status was agreed to be Admission Status: observation status to the service of Dr Alexys Rojas          Follow-up Information    None         Current Discharge Medication List      CONTINUE these medications which have NOT CHANGED    Details   amLODIPine (NORVASC) 5 mg tablet Take 1 tablet (5 mg total) by mouth daily  Qty: 30 tablet, Refills: 0    Associated Diagnoses: Essential hypertension      aspirin 81 MG tablet Take 81 mg by mouth daily      glipiZIDE (GLUCOTROL) 5 mg tablet Take 0 5 tablets (2 5 mg total) by mouth 2 (two) times a day before meals  Qty: 30 tablet, Refills: 0    Associated Diagnoses: Diabetes mellitus due to underlying condition with stage 3 chronic kidney disease, unspecified whether long term insulin use ([facility] [address] )           No discharge procedures on file      ED Provider  Electronically Signed by           Moshe Bustos PA-C  01/29/19 0812

## 2019-01-27 NOTE — ASSESSMENT & PLAN NOTE
Multifactorial   Patient recently was hospitalized for acute kidney injury  His renal functions are actually improving    PT/OT recommend inpatient rehab  Follow  for placement

## 2019-01-27 NOTE — ASSESSMENT & PLAN NOTE
As per patient and family, he was diagnosed having liver mass recently  He had biopsy done and results are pending    Patient follow up on outpatient basis after discharge from the hospital

## 2019-01-28 ENCOUNTER — APPOINTMENT (OUTPATIENT)
Dept: NON INVASIVE DIAGNOSTICS | Facility: HOSPITAL | Age: 79
End: 2019-01-28
Payer: COMMERCIAL

## 2019-01-28 LAB
ALBUMIN SERPL BCP-MCNC: 2.8 G/DL (ref 3.5–5)
ALP SERPL-CCNC: 96 U/L (ref 46–116)
ALT SERPL W P-5'-P-CCNC: 14 U/L (ref 12–78)
ANION GAP SERPL CALCULATED.3IONS-SCNC: 12 MMOL/L (ref 4–13)
AST SERPL W P-5'-P-CCNC: 11 U/L (ref 5–45)
BILIRUB SERPL-MCNC: 0.3 MG/DL (ref 0.2–1)
BUN SERPL-MCNC: 53 MG/DL (ref 5–25)
CALCIUM SERPL-MCNC: 8.8 MG/DL (ref 8.3–10.1)
CHLORIDE SERPL-SCNC: 104 MMOL/L (ref 100–108)
CO2 SERPL-SCNC: 26 MMOL/L (ref 21–32)
CREAT SERPL-MCNC: 3.16 MG/DL (ref 0.6–1.3)
ERYTHROCYTE [DISTWIDTH] IN BLOOD BY AUTOMATED COUNT: 12.6 % (ref 11.6–15.1)
EST. AVERAGE GLUCOSE BLD GHB EST-MCNC: 166 MG/DL
GFR SERPL CREATININE-BSD FRML MDRD: 18 ML/MIN/1.73SQ M
GLUCOSE P FAST SERPL-MCNC: 166 MG/DL (ref 65–99)
GLUCOSE SERPL-MCNC: 166 MG/DL (ref 65–140)
GLUCOSE SERPL-MCNC: 194 MG/DL (ref 65–140)
GLUCOSE SERPL-MCNC: 276 MG/DL (ref 65–140)
GLUCOSE SERPL-MCNC: 384 MG/DL (ref 65–140)
GLUCOSE SERPL-MCNC: 412 MG/DL (ref 65–140)
HBA1C MFR BLD: 7.4 % (ref 4.2–6.3)
HCT VFR BLD AUTO: 25 % (ref 36.5–49.3)
HGB BLD-MCNC: 8.7 G/DL (ref 12–17)
MAGNESIUM SERPL-MCNC: 1.6 MG/DL (ref 1.6–2.6)
MCH RBC QN AUTO: 30.5 PG (ref 26.8–34.3)
MCHC RBC AUTO-ENTMCNC: 34.8 G/DL (ref 31.4–37.4)
MCV RBC AUTO: 88 FL (ref 82–98)
PLATELET # BLD AUTO: 166 THOUSANDS/UL (ref 149–390)
PMV BLD AUTO: 11.4 FL (ref 8.9–12.7)
POTASSIUM SERPL-SCNC: 3.9 MMOL/L (ref 3.5–5.3)
PROT SERPL-MCNC: 6.6 G/DL (ref 6.4–8.2)
RBC # BLD AUTO: 2.85 MILLION/UL (ref 3.88–5.62)
SODIUM SERPL-SCNC: 142 MMOL/L (ref 136–145)
WBC # BLD AUTO: 9.49 THOUSAND/UL (ref 4.31–10.16)

## 2019-01-28 PROCEDURE — G8978 MOBILITY CURRENT STATUS: HCPCS

## 2019-01-28 PROCEDURE — 82948 REAGENT STRIP/BLOOD GLUCOSE: CPT

## 2019-01-28 PROCEDURE — 97163 PT EVAL HIGH COMPLEX 45 MIN: CPT

## 2019-01-28 PROCEDURE — G8979 MOBILITY GOAL STATUS: HCPCS

## 2019-01-28 PROCEDURE — 97530 THERAPEUTIC ACTIVITIES: CPT

## 2019-01-28 PROCEDURE — 80053 COMPREHEN METABOLIC PANEL: CPT | Performed by: INTERNAL MEDICINE

## 2019-01-28 PROCEDURE — 83735 ASSAY OF MAGNESIUM: CPT | Performed by: INTERNAL MEDICINE

## 2019-01-28 PROCEDURE — G8988 SELF CARE GOAL STATUS: HCPCS

## 2019-01-28 PROCEDURE — G8987 SELF CARE CURRENT STATUS: HCPCS

## 2019-01-28 PROCEDURE — 97167 OT EVAL HIGH COMPLEX 60 MIN: CPT

## 2019-01-28 PROCEDURE — 93306 TTE W/DOPPLER COMPLETE: CPT

## 2019-01-28 PROCEDURE — 99226 PR SBSQ OBSERVATION CARE/DAY 35 MINUTES: CPT | Performed by: FAMILY MEDICINE

## 2019-01-28 PROCEDURE — 85027 COMPLETE CBC AUTOMATED: CPT | Performed by: INTERNAL MEDICINE

## 2019-01-28 PROCEDURE — 93306 TTE W/DOPPLER COMPLETE: CPT | Performed by: INTERNAL MEDICINE

## 2019-01-28 RX ADMIN — INSULIN LISPRO 6 UNITS: 100 INJECTION, SOLUTION INTRAVENOUS; SUBCUTANEOUS at 17:22

## 2019-01-28 RX ADMIN — HEPARIN SODIUM 5000 UNITS: 5000 INJECTION, SOLUTION INTRAVENOUS; SUBCUTANEOUS at 07:05

## 2019-01-28 RX ADMIN — AMLODIPINE BESYLATE 5 MG: 5 TABLET ORAL at 09:12

## 2019-01-28 RX ADMIN — HEPARIN SODIUM 5000 UNITS: 5000 INJECTION, SOLUTION INTRAVENOUS; SUBCUTANEOUS at 14:18

## 2019-01-28 RX ADMIN — INSULIN LISPRO 4 UNITS: 100 INJECTION, SOLUTION INTRAVENOUS; SUBCUTANEOUS at 12:31

## 2019-01-28 RX ADMIN — HEPARIN SODIUM 5000 UNITS: 5000 INJECTION, SOLUTION INTRAVENOUS; SUBCUTANEOUS at 22:26

## 2019-01-28 RX ADMIN — INSULIN LISPRO 4 UNITS: 100 INJECTION, SOLUTION INTRAVENOUS; SUBCUTANEOUS at 22:27

## 2019-01-28 RX ADMIN — ASPIRIN 81 MG 81 MG: 81 TABLET ORAL at 09:13

## 2019-01-28 RX ADMIN — INSULIN LISPRO 2 UNITS: 100 INJECTION, SOLUTION INTRAVENOUS; SUBCUTANEOUS at 09:13

## 2019-01-28 RX ADMIN — GLIPIZIDE 2.5 MG: 5 TABLET ORAL at 09:12

## 2019-01-28 NOTE — PROGRESS NOTES
Progress Note - Carina Reddy 1940, 77 y o  male MRN: [id number]    Unit/Bed#: -01 Encounter: [id number]    Primary Care Provider: Bernarda Elizalde,    Date and time admitted to hospital: 1/27/2019 11:46 AM        * Vasovagal syncope   Assessment & Plan    From his story, it appears that he passed out-possibly vasovagal as he was bending over to empty his urinary bladder  Symptoms have resolved  He feels weak in general as he has not eaten much  Would watch him overnight  Check orthostatic blood pressure/pulse  Continue most of his home medications  PT/OT evaluation recommend inpatient rehab  Discussed with family  All questions were answered     Liver mass   Assessment & Plan    As per patient and family, he was diagnosed having liver mass recently  He had biopsy done and results are pending  Patient follow up on outpatient basis after discharge from the hospital      Accelerated hypertension   Assessment & Plan    Blood pressure on the higher side  Not sure if he took his medications this morning  However, I would watch his blood pressure closely and to make sure that he does not drop his blood pressure significantly on standing up as this could be the cause of his syncope  If he has not had an echocardiogram in the past, would also order one  Anemia   Assessment & Plan    Chronic  Hemoglobin stable  No need for any blood transfusion  Recently, he was in the hospital and also received IV fluids  Partly could be dilutional   Continue to monitor     Acute renal failure superimposed on stage 3 chronic kidney disease ([address] )   Assessment & Plan    Creatinine improving  He has Haile catheter in  Likely has some obstructive issues  Continue to maintain Haile and follow up with his urologist on outpatient basis        Follow up on outpatient basis with his nephrologist        Diabetes mellitus [facility])   Assessment & Plan    Lab Results   Component Value Date    HGBA1C 7 7 (H) 2018       Recent Labs      19   1956  19   0635  19   1053  19   1454   POCGLU  348*  164*  253*  278*       Blood Sugar Average: Last 72 hrs:  Check hemoglobin A1c  Sugars have been in 200s at home  He was recently put on glyburide/sulfonylureas  I am concerned about if he had hypoglycemic episode although his sugar as per family was more than 200 when she checked it when he had syncopal episode  I would only continue at once daily glyburide he was taking at home instead of twice a day  Weakness generalized   Assessment & Plan    Multifactorial   Patient recently was hospitalized for acute kidney injury  His renal functions are actually improving  PT/OT recommend inpatient rehab  Follow  for placement       VTE Pharmacologic Prophylaxis:   Pharmacologic: Heparin  Mechanical VTE Prophylaxis in Place: Yes    Patient Centered Rounds: I have performed bedside rounds with nursing staff today  Discussions with Specialists or Other Care Team Provider:     Education and Discussions with Family / Patient: patient     Time Spent for Care: 20 minutes  More than 50% of total time spent on counseling and coordination of care as described above  Current Length of Stay: 0 day(s)    Current Patient Status: Observation   Certification Statement: The patient will continue to require additional inpatient hospital stay due to acute above conditions    Discharge Plan: depend on clinical course and placement     Code Status: Level 1 - Full Code      Subjective:   Patient stated he still feel weak  Denies any dizziness  I explained the patient the glucose has been high in [facility] any further evaluation, glipizide will be  hold      Objective:     Vitals:   Temp (24hrs), Av 2 °F (36 8 °C), Min:97 4 °F (36 3 °C), Max:98 7 °F (37 1 °C)    Temp:  [97 4 °F (36 3 °C)-98 7 °F (37 1 °C)] 97 4 °F (36 3 °C)  HR:  [] 90  Resp:  [17-22] 22  BP: (129-191)/(53-82) 137/63  SpO2:  [90 %-99 %] 90 %  Body mass index is 23 06 kg/m²  Input and Output Summary (last 24 hours): Intake/Output Summary (Last 24 hours) at 01/28/19 1422  Last data filed at 01/28/19 1416   Gross per 24 hour   Intake              120 ml   Output             2800 ml   Net            -2680 ml       Physical Exam:     Physical Exam   Constitutional: He is oriented to person, place, and time  No distress  Cardiovascular: Normal rate, regular rhythm, normal heart sounds and intact distal pulses  Exam reveals no gallop  No murmur heard  Pulmonary/Chest: No respiratory distress  He has no wheezes  He has no rales  He exhibits no tenderness  Abdominal: Bowel sounds are normal  He exhibits no distension and no mass  There is no tenderness  There is no rebound and no guarding  Neurological: He is alert and oriented to person, place, and time  He has normal reflexes  No cranial nerve deficit  Coordination abnormal    Skin: Skin is warm  He is not diaphoretic  There is pallor  Additional Data:     Labs:      Results from last 7 days  Lab Units 01/28/19  0438  01/27/19  1252   WBC Thousand/uL 9 49  --  12 67*   HEMOGLOBIN g/dL 8 7*  --  10 1*   HEMATOCRIT % 25 0*  --  30 1*   PLATELETS Thousands/uL 166  < > 188   NEUTROS PCT %  --   --  80*   LYMPHS PCT %  --   --  7*   MONOS PCT %  --   --  8   EOS PCT %  --   --  3   < > = values in this interval not displayed      Results from last 7 days  Lab Units 01/28/19  0438   SODIUM mmol/L 142   POTASSIUM mmol/L 3 9   CHLORIDE mmol/L 104   CO2 mmol/L 26   BUN mg/dL 53*   CREATININE mg/dL 3 16*   ANION GAP mmol/L 12   CALCIUM mg/dL 8 8   ALBUMIN g/dL 2 8*   TOTAL BILIRUBIN mg/dL 0 30   ALK PHOS U/L 96   ALT U/L 14   AST U/L 11   GLUCOSE RANDOM mg/dL 166*       Results from last 7 days  Lab Units 01/27/19  1252   INR  1 04       Results from last 7 days  Lab Units 01/28/19  1223 01/28/19  0615 01/27/19  2140 01/25/19  1454 01/25/19  1053 01/25/19  0635 01/24/19  1956 01/24/19  1608 01/24/19  1105 01/24/19  0615 01/23/19  2153 01/23/19  1813   POC GLUCOSE mg/dl 384* 194* 383* 278* 253* 164* 348* 208* 306* 138 205* 158*       Results from last 7 days  Lab Units 01/27/19 2015   HEMOGLOBIN A1C % 7 4*               * I Have Reviewed All Lab Data Listed Above  * Additional Pertinent Lab Tests Reviewed: All Labs Within Last 24 Hours Reviewed    Imaging:    Imaging Reports Reviewed Today Include:   Imaging Personally Reviewed by Myself Includes:      Recent Cultures (last 7 days):       Results from last 7 days  Lab Units 01/23/19 2204   C DIFF TOXIN B  NEGATIVE for C difficle toxin by PCR  Last 24 Hours Medication List:     Current Facility-Administered Medications:  acetaminophen 650 mg Oral Q6H PRN Naomie Blancas MD    amLODIPine 5 mg Oral Once [employer]    amLODIPine 5 mg Oral Daily Naomie Blancas MD    aspirin 81 mg Oral Daily Naomie Blancas MD    glipiZIDE 2 5 mg Oral Daily Before Shreyas Montes MD    heparin (porcine) 5,000 Units Subcutaneous [facility] Naomie Blancas MD    insulin lispro 1-6 Units Subcutaneous TID Josias Ibrahim MD    insulin lispro 1-6 Units Subcutaneous HS Naomie Blancas MD    sodium chloride 75 mL/hr Intravenous Continuous Naomie Blancas MD Last Rate: 75 mL/hr (01/27/19 2100)        Today, Patient Was Seen By: Roger Mcgill MD    ** Please Note: Dictation voice to text software may have been used in the creation of this document   **

## 2019-01-28 NOTE — UTILIZATION REVIEW
Notification of Discharge  This is a Notification of Discharge from our facility [facility]  Please be advised that this patient has been discharge from our facility  Below you will find the admission and discharge date and time including the patients disposition  PRESENTATION DATE: 1/23/2019  1:17 PM  IP ADMISSION DATE: 1/23/19 1709  DISCHARGE DATE: 1/25/2019  5:15 PM  DISPOSITION: [address] Utilization Review Department  Phone: [phone]; Fax [phone]  [email or web address]  ATTENTION: Please call with any questions or concerns to [phone]  and carefully listen to the prompts so that you are directed to the right person  Send all requests for admission clinical reviews, approved or denied determinations and any other requests to fax [phone]   All voicemails are confidential

## 2019-01-28 NOTE — PLAN OF CARE
Prexisting or High Potential for Compromised Skin Integrity     Skin integrity is maintained or improved Progressing        SAFETY ADULT     Patient will remain free of falls Progressing     Maintain or return to baseline ADL function Progressing     Maintain or return mobility status to optimal level Progressing

## 2019-01-28 NOTE — SOCIAL WORK
LOS 0   Lace 76   Pt is not a re-admit    CM met with pt and family at bedside to discuss DCP  Pt resides in [facility] in a ranch style home with 3 BETH  The pt has a walker in the home and completes all his ADLs independently  Pt reports not being in STR or VNA  The pt uses [facility] pharmacy in [place] and is able to pay for medications without difficulty  His PCP is Dr Eliseo Reyes,   CM reviewed PT recommendation for STR  Pt initially declining STR, but has agreeable to review the list of rehabs for potential placement  Otherwise plans to return home with VNA  Discussed with RN and SLIM  CM will continue to follow  CM reviewed discharge planning process including the following: identifying caregivers at home, preference for d/c planning needs, availability of treatment team to discuss questions or concerns patient and/or family may have regarding diagnosis, plan of care, old or new medications and discharge planning  Discharge Checklist reviewed and CM will continue to monitor for progress toward discharge goals in nursing and provider rounds

## 2019-01-28 NOTE — PLAN OF CARE
Problem: DISCHARGE PLANNING - CARE MANAGEMENT  Goal: Discharge to post-acute care or home with appropriate resources  INTERVENTIONS:  - Conduct assessment to determine patient/family and health care team treatment goals, and need for post-acute services based on payer coverage, community resources, and patient preferences, and barriers to discharge  - Address psychosocial, clinical, and financial barriers to discharge as identified in assessment in conjunction with the patient/family and health care team  - Arrange appropriate level of post-acute services according to patients   needs and preference and payer coverage in collaboration with the physician and health care team  - Communicate with and update the patient/family, physician, and health care team regarding progress on the discharge plan  - Arrange appropriate transportation to post-acute venues  Outcome: Progressing  LOS 0   Lace 68   Pt is not a re-admit     CM met with pt and family at bedside to discuss DCP  Pt resides in [facility] in a ranch style home with 3 BETH  The pt has a walker in the home and completes all his ADLs independently  Pt reports not being in STR or VNA  The pt uses "[facility] pharmacy in [place] and is able to pay for medications without difficulty  His PCP is Dr Tamiko Mendosa DO       CM reviewed PT recommendation for STR  Pt initially declining STR, but has agreeable to review the list of rehabs for potential placement  Otherwise plans to return home with VNA  Discussed with RN and SLIM  CM will continue to follow      CM reviewed discharge planning process including the following: identifying caregivers at home, preference for d/c planning needs, availability of treatment team to discuss questions or concerns patient and/or family may have regarding diagnosis, plan of care, old or new medications and discharge planning   Discharge Checklist reviewed and CM will continue to monitor for progress toward discharge goals in nursing and provider rounds

## 2019-01-28 NOTE — PLAN OF CARE
Problem: PHYSICAL THERAPY ADULT  Goal: Performs mobility at highest level of function for planned discharge setting  See evaluation for individualized goals  Treatment/Interventions: Functional transfer training, LE strengthening/ROM, Elevations, Therapeutic exercise, Endurance training, Patient/family training, Equipment eval/education, Bed mobility, Gait training, Spoke to nursing  Equipment Recommended: Jessica Houston       See flowsheet documentation for full assessment, interventions and recommendations  Prognosis: Good  Problem List: Decreased strength, Decreased endurance, Impaired balance, Decreased mobility, Decreased safety awareness, Impaired sensation  Assessment: Pt is 77 y o  male seen for PT evaluation on 1/28/2019 s/p admit to [facility] on 1/27/2019 w/ Vasovagal syncope  PT consulted to assess pt's functional mobility and d/c needs  Order placed for PT eval and tx, w/ up w/ A order  Performed at least 2 patient identifiers during session: Name and wristband  Comorbidities affecting pt's physical performance at time of assessment include: accelerated HTN, anemia, ARF superimposed on CKD stage 3, DM, generalized weakness  PTA, pt was independent w/ all functional mobility w/ no AD at baseline, ambulates community distances and elevations, ambulates household distances, has 3 BETH, lives w/ spouse in 1 level home and retired  Personal factors affecting pt at time of IE include: ambulating w/ assistive device, stairs to enter home, inability to navigate level surfaces w/o external assistance, unable to perform dynamic tasks in community, positive fall history, decreased initiation and engagement, limited insight into impairments and inability to perform IADLs   Please find objective findings from PT assessment regarding body systems outlined above with impairments and limitations including weakness, impaired balance, decreased endurance, decreased activity tolerance, decreased functional mobility tolerance and fall risk, as well as mobility assessment (need for minimal assist w/ all phases of mobility when usually ambulating independently)  The following objective measures performed on IE also reveal limitations: Barthel Index: 45/100  Pt's clinical presentation is currently unstable/unpredictable seen in pt's presentation of ongoing medical assessment  Pt to benefit from continued PT tx to address deficits as defined above and maximize level of functional independent mobility and consistency  From PT/mobility standpoint, recommendation at time of d/c would be STR pending progress in order to facilitate return to PLOF  Barriers to Discharge: Inaccessible home environment     Recommendation: Short-term skilled PT     PT - OK to Discharge: Yes (when medically cleared, if to STR)    See flowsheet documentation for full assessment

## 2019-01-28 NOTE — PLAN OF CARE
Problem: OCCUPATIONAL THERAPY ADULT  Goal: Performs self-care activities at highest level of function for planned discharge setting  See evaluation for individualized goals  Treatment Interventions: ADL retraining, Functional transfer training, Endurance training, Patient/family training, Equipment evaluation/education, Compensatory technique education, Continued evaluation, Cardiac education, Energy conservation, Activityengagement          See flowsheet documentation for full assessment, interventions and recommendations  Limitation: Decreased ADL status, Decreased UE strength, Decreased Safe judgement during ADL, Decreased endurance, Decreased fine motor control, Decreased self-care trans, Decreased high-level ADLs  Prognosis: Good  Assessment: Patient is a 77 y o  male seen for OT evaluation s/p admit to [id number] [place] [address] on 1/27/2019 w/Vasovagal syncope  Commorbidities affecting patient's functional performance at time of assessment include: accelerated HTN, anemia, ARF superimposed on CKD stage 3, DM, generalized weakness  Orders placed for OT evaluation and treatment  Performed at least two patient identifiers during session including name and wristband  Prior to admission, Patient reporting independent with ADLs/ IADLs, lives with spouse in a one story house, 3 BETH, patient drives and manages affairs independently  Personal factors affecting patient at time of initial evaluation include: decreased initiation and engagement, difficulty performing ADLs and difficulty performing IADLs  Upon evaluation, patient requires minimal  assist for UB ADLs, moderate assist for LB ADLs, transfers and functional ambulation in room and bathroom with moderate assist, with the use of Rolling Walker   Occupational performance is affected by the following deficits: degenerative arthritic joint changes, dynamic sit/ stand balance deficit with poor standing tolerance time for self care and functional mobility, decreased activity tolerance, decreased safety awareness, increased pain and postural control and postural alignment deficit, requiring external assistance to complete transitional movements  Therapist completed  extensive additional review of medical records and additional review of physical, cognitive or psychosocial history, clinical examination identifying 5 or more performance deficits, clinical decision making of a high complexity , consistent with a high complexity level evaluation  Patient to benefit from continued Occupational Therapy treatment while in the hospital to address deficits as defined above and maximize level of functional independence with ADLs and functional mobility  Occupational Performance areas to address include: bathing/ shower, dressing, toilet hygiene, transfer to all surfaces, functional mobility, emergency response, health maintenance, medication routine/ management, IADLs: safety procedures, IADLs: meal prep/ clean up, Leisure Participation and Social participation  From OT standpoint, recommendation at time of d/c would be Short Term Rehab         OT Discharge Recommendation: Short Term Rehab

## 2019-01-28 NOTE — UTILIZATION REVIEW
Initial Clinical Review    Admission: Date/Time/Statement:   OBS   1/27 1410  Orders Placed This Encounter   Procedures    Place in Observation (expected length of stay for this patient is less than two midnights)     Standing Status:   Standing     Number of Occurrences:   1     Order Specific Question:   Admitting Physician     Answer:   Maureen Grossman [[id number]]     Order Specific Question:   Level of Care     Answer:   Med Surg [16]     ED: Date/Time/Mode of Arrival:   ED Arrival Information     Expected Arrival Acuity Means of Arrival Escorted By Service Admission Type    - 1/27/2019 11:46 Urgent Ambulance [facility] Urgent    Arrival Complaint    syncopy        Chief Complaint:   Chief Complaint   Patient presents with    Syncope     possible syncopal episode prior to arrival  Pt reports standing up, becoming dizzy and nauseas  wife states he fell back and his eyes rolled back  pt denies nausea and dizziness durimng triage  History of Illness:77 yo male to ED from home after syncope episode  He got up to change his garcia bag and felt dizzy /lightheaded, he rolled back on his bed  Feels weak        ED Vital Signs:   ED Triage Vitals [01/27/19 1148]   Temperature Pulse Respirations Blood Pressure SpO2   (!) 97 3 °F (36 3 °C) 74 17 (!) 184/86 98 %      Temp Source Heart Rate Source Patient Position - Orthostatic VS BP Location FiO2 (%)   Oral Monitor Sitting Right arm --      Pain Score       No Pain        Wt Readings from Last 1 Encounters:   01/28/19 68 8 kg (151 lb 10 8 oz)     Vital Signs (abnormal):wnl   Pertinent Labs/Diagnostic Test Results:   Color, UA Yellow     Clarity, UA Clear    Specific [place], UA 1 015 1 003 - 1 030     pH, UA 6 0 4 5 - 8 0     Leukocytes, UA Negative Negative     Nitrite, UA Positive (A) Negative     Protein, UA 30 (1+) (A) Negative mg/dl     Glucose,  (1/2%) (A) Negative mg/dl     Ketones, UA Negative Negative mg/dl     Urobilinogen, UA 0 2 0  2, 1 0 E U /dl E U /dl     Bilirubin, UA Negative Negative     Blood, UA Large (A) Negative    Alb   3 2, BNP  753   BUN 57 (H) 5 - 25 mg/dL     Creatinine 3 59 (H) 0 60 - 1 30 mg/dL     Comment: Standardized to IDMS reference method       Glucose 216 (H) 65 - 140 mg/dL           WBC 12 67 (H) 4 31 - 10 16 Thousand/uL     RBC 3 34 (L) 3 88 - 5 62 Million/uL     Hemoglobin 10 1 (L) 12 0 - 17 0 g/dL     Hematocrit 30 1 (L) 36 5 - 49 3 %    CT  Head- wnl   EKG-    Sinus rhythm with occasional Premature ventricular complexes and Premature atrial complexes  Left axis deviation  Left ventricular hypertrophy with repolarization abnormality  Possible Lateral infarct , age undetermined  Abnormal ECG        D Treatment:   Medication Administration from 01/27/2019 1146 to 01/27/2019 1948       Date/Time Order Dose Route Action Action by Comments     01/27/2019 1432 sodium chloride 0 9 % bolus 1,000 mL 0 mL Intravenous Stopped [employer] [facility], RN      01/27/2019 1245 sodium chloride 0 9 % bolus 1,000 mL 1,000 mL Intravenous New Bag [facility], RN         Past Medical/Surgical History: Active Ambulatory Problems     Diagnosis Date Noted    Diarrhea 01/23/2019    Weakness generalized 01/23/2019    Diabetes mellitus ([facility] ) 01/23/2019    Hypertension 01/23/2019    Hyperlipidemia 01/23/2019    Cardiac disease 01/23/2019    Acute renal failure superimposed on stage 3 chronic kidney disease ([facility] ) 01/23/2019    Generalized abdominal mass 01/23/2019    Hydroureter 01/23/2019    Anemia 01/24/2019     Past Medical History:   Diagnosis Date    Cardiac disease     Diabetes mellitus ([facility] )     Hyperlipidemia     Hypertension      Admitting Diagnosis: Syncope [R55]  Anemia [D64 9]  CKD (chronic kidney disease) [N18 9]  Age/Sex: 77 y o  male  Assessment/Plan:   Vasovagal syncope   Assessment & Plan     From his story, it appears that he passed out-possibly vasovagal as he was bending over to empty his urinary bladder  Symptoms have resolved  He feels weak in general as he has not eaten much  Would watch him overnight  Check orthostatic blood pressure/pulse  Continue most of his home medications  PT/OT evaluation  Discussed with family  All questions were answered      Liver mass   Assessment & Plan     As per patient and family, he was diagnosed having liver mass recently  He had biopsy done and results are pending  Patient follow up on outpatient basis after discharge from the hospital       Accelerated hypertension   Assessment & Plan     Blood pressure on the higher side  Not sure if he took his medications this morning  However, I would watch his blood pressure closely and to make sure that he does not drop his blood pressure significantly on standing up as this could be the cause of his syncope  If he has not had an echocardiogram in the past, would also order one         Anemia   Assessment & Plan     Chronic  Hemoglobin stable  No need for any blood transfusion  Recently, he was in the hospital and also received IV fluids  Partly could be dilutional   Continue to monitor      Acute renal failure superimposed on stage 3 chronic kidney disease ([facility] )   Assessment & Plan     Creatinine improving  He has Haile catheter in  Likely has some obstructive issues  Continue to maintain Haile and follow up with his urologist on outpatient basis  Follow-up labs in the morning as well  Consult Nephrology while he is here  Follow up on outpatient basis with his nephrologist   I thing his creatinine would continue to improve slowly      Diabetes mellitus [facility])   Assessment & Plan             Lab Results   Component Value Date     HGBA1C 7 7 (H) 09/27/2018             Recent Labs      01/24/19   1956  01/25/19   0635  01/25/19   1053  01/25/19   1454   POCGLU  348*  164*  253*  278*    Blood Sugar Average: Last 72 hrs:  Check hemoglobin A1c  Sugars have been in 200s at home    He was recently put on glyburide/sulfonylureas  I am concerned about if he had hypoglycemic episode although his sugar as per family was more than 200 when she checked it when he had syncopal episode  I would only continue at once daily glyburide he was taking at home instead of twice a day     Weakness generalized   Assessment & Plan     Multifactorial   Patient recently was hospitalized for acute kidney injury  His renal functions are actually improving  PT/OT        Admission Orders:  Scheduled Meds:   Current Facility-Administered Medications:  acetaminophen 650 mg Oral Q6H PRN     amLODIPine 5 mg Oral Once     amLODIPine 5 mg Oral Daily     aspirin 81 mg Oral Daily     glipiZIDE 2 5 mg Oral Daily Before Breakfast     heparin (porcine) 5,000 Units Subcutaneous Q8H [facility] & assisted     insulin lispro 1-6 Units Subcutaneous TID AC     insulin lispro 1-6 Units Subcutaneous HS     sodium chloride 75 mL/hr Intravenous Continuous  Last Rate: 75 mL/hr (01/27/19 2100)       Fingerstick ac and hs   Reg diet   Tele   I&O   Daily weight   Orthostatic BP   Tele   OT PT eval   Up w/ assist   nephrology consult   Serial trop #1 0 04  #2  0 06  #3  0 07    1/28  BUN creat   53  3 16, gluc  166, alb 2 8, H&H   8 7  25 0      IM note 1/28  Vasovagal syncope   Assessment & Plan     From his story, it appears that he passed out-possibly vasovagal as he was bending over to empty his urinary bladder  Symptoms have resolved  He feels weak in general as he has not eaten much  Would watch him overnight  Check orthostatic blood pressure/pulse  Continue most of his home medications  PT/OT evaluation recommend inpatient rehab  Discussed with family  All questions were answered      Liver mass   Assessment & Plan     As per patient and family, he was diagnosed having liver mass recently  He had biopsy done and results are pending    Patient follow up on outpatient basis after discharge from the hospital       Accelerated hypertension   Assessment & Plan     Blood pressure on the higher side  Not sure if he took his medications this morning  However, I would watch his blood pressure closely and to make sure that he does not drop his blood pressure significantly on standing up as this could be the cause of his syncope  If he has not had an echocardiogram in the past, would also order one         Anemia   Assessment & Plan     Chronic  Hemoglobin stable  No need for any blood transfusion  Recently, he was in the hospital and also received IV fluids  Partly could be dilutional   Continue to monitor      Acute renal failure superimposed on stage 3 chronic kidney disease ([address] )   Assessment & Plan     Creatinine improving  He has Haile catheter in  Likely has some obstructive issues  Continue to maintain Haile and follow up with his urologist on outpatient basis  Follow up on outpatient basis with his nephrologist         Diabetes mellitus [facility])   Assessment & Plan             Lab Results   Component Value Date     HGBA1C 7 7 (H) 09/27/2018                Recent Labs      01/24/19   1956  01/25/19   0635  01/25/19   1053  01/25/19   1454   POCGLU  348*  164*  253*  278*         Blood Sugar Average: Last 72 hrs:  Check hemoglobin A1c  Sugars have been in 200s at home  He was recently put on glyburide/sulfonylureas  I am concerned about if he had hypoglycemic episode although his sugar as per family was more than 200 when she checked it when he had syncopal episode  I would only continue at once daily glyburide he was taking at home instead of twice a day       Weakness generalized   Assessment & Plan     Multifactorial   Patient recently was hospitalized for acute kidney injury  His renal functions are actually improving    PT/OT recommend inpatient rehab  Follow  for placement

## 2019-01-28 NOTE — PHYSICAL THERAPY NOTE
Physical Therapy Evaluation     Patient's Name: Devorah Alejandro    Admitting Diagnosis  Syncope [R55]  Anemia [D64 9]  CKD (chronic kidney disease) [N18 9]    Problem List  Patient Active Problem List   Diagnosis    Diarrhea    Weakness generalized    Diabetes mellitus ([facility] )    Hypertension    Hyperlipidemia    Cardiac disease    [facility] stage [facility]    Generalized abdominal mass    Hydroureter    Anemia    Vasovagal syncope    Accelerated hypertension    Liver mass       Past Medical History  Past Medical History:   Diagnosis Date    Cardiac disease     Diabetes mellitus ([facility] )     Hyperlipidemia     Hypertension        Past Surgical History  Past Surgical History:   Procedure Laterality Date    IR IMAGE GUIDED BIOPSY/ASPIRATION LIVER  1/24/2019 01/28/19 1030   Note Type   Note type Eval/Treat   Pain Assessment   Pain Assessment 0-10   Pain Score 4   Pain Location Abdomen   Pain Orientation Left; Lower   Pain Descriptors Aching   Pain Frequency Constant/continuous   Pain Onset Ongoing   Clinical Progression Not changed   Home Living   Type of [address] One level;Performs ADLs on one level; Able to live on main level with bedroom/bathroom;Stairs to enter with rails  (3 BETH w/ HR)   Bathroom Shower/Tub Tub/shower unit   Bathroom Toilet Standard   Bathroom Equipment Shower chair; Toilet raiser   [address] Walker;Cane;Wheelchair-manual  (no AD at baseline)   Prior Function   Level of [place] Independent with ADLs and functional mobility   Lives With Spouse  (home w/ pt)   Receives Help From Family   ADL Assistance Independent   IADLs Independent  (pt reports managing his own meds)   Falls in the last 6 months 1 to 4   Vocational Retired   Comments pt drives   Restrictions/Precautions   [employer] Bearing Precautions Per Order No   Other Precautions Telemetry;Multiple lines; Fall Risk   General Family/Caregiver Present No   Cognition   Arousal/Participation Alert   Orientation Level Oriented X4   Memory Within functional limits   Following Commands Follows all commands and directions without difficulty   Comments pt agreeable to therapy session   RUE Assessment   RUE Assessment WFL   LUE Assessment   LUE Assessment WFL   RLE Assessment   RLE Assessment WFL  (4/5)   LLE Assessment   LLE Assessment WFL  (4/5)   Coordination   Sensation X  ((+) numbness/tingling B hands/feet)   Bed Mobility   Rolling L 5  Supervision   Additional items Assist x 1;HOB elevated; Bedrails; Increased time required   Supine to Sit 4  Minimal assistance   Additional items Assist x 1;HOB elevated; Bedrails; Increased time required;Verbal cues   Transfers   Sit to Stand 4  Minimal assistance   Additional items Assist x 2; Increased time required;Verbal cues   Stand to Sit 4  Minimal assistance   Additional items Assist x 2;Armrests; Verbal cues   Ambulation/Elevation   Gait pattern Improper Weight shift;Decreased foot clearance;Shuffling; Short stride; Excessively slow   Gait Assistance 4  Minimal assist   Additional items Assist x 2; Tactile cues; Verbal cues   Assistive Device Rolling walker   Distance 20' x 2   Balance   Static Sitting Fair +   Dynamic Sitting Fair   Static Standing Fair   Dynamic Standing Fair -   Ambulatory Fair -   Endurance Deficit   Endurance Deficit Yes   Activity Tolerance   Activity Tolerance Patient limited by fatigue   Medical Staff Made Aware pt w/ up w/ A order   Nurse Made Aware Yes, RN Gerry Sauceda verbalized pt appropriate for PT session, made aware of outcomes   Assessment   Prognosis Good   Problem List Decreased strength;Decreased endurance; Impaired balance;Decreased mobility; Decreased safety awareness; Impaired sensation   Assessment Pt is 77 y o  male seen for PT evaluation on 1/28/2019 s/p admit to [facility] on 1/27/2019 w/ Vasovagal syncope   PT consulted to assess pt's functional mobility and d/c needs  Order placed for PT eval and tx, w/ up w/ A order  Performed at least 2 patient identifiers during session: Name and wristband  Comorbidities affecting pt's physical performance at time of assessment include: accelerated HTN, anemia, ARF superimposed on CKD stage 3, DM, generalized weakness  PTA, pt was independent w/ all functional mobility w/ no AD at baseline, ambulates community distances and elevations, ambulates household distances, has 3 BETH, lives w/ spouse in 1 level home and retired  Personal factors affecting pt at time of IE include: ambulating w/ assistive device, stairs to enter home, inability to navigate level surfaces w/o external assistance, unable to perform dynamic tasks in community, positive fall history, decreased initiation and engagement, limited insight into impairments and inability to perform IADLs  Please find objective findings from PT assessment regarding body systems outlined above with impairments and limitations including weakness, impaired balance, decreased endurance, decreased activity tolerance, decreased functional mobility tolerance and fall risk, as well as mobility assessment (need for minimal assist w/ all phases of mobility when usually ambulating independently)  The following objective measures performed on IE also reveal limitations: Barthel Index: 45/100  Pt's clinical presentation is currently unstable/unpredictable seen in pt's presentation of ongoing medical assessment  Pt to benefit from continued PT tx to address deficits as defined above and maximize level of functional independent mobility and consistency  From PT/mobility standpoint, recommendation at time of d/c would be STR pending progress in order to facilitate return to PLOF     Barriers to Discharge Inaccessible home environment   Goals   Patient Goals "to return home"   STG Expiration Date 02/07/19   Short Term Goal #1 In 7-10 days: Increase bilateral LE strength 1/2 grade to facilitate independent mobility, Perform all bed mobility tasks modified independent to decrease caregiver burden, Perform all transfers modified independent to improve independence, Ambulate > 150 ft  with least restrictive assistive device modified independent w/o LOB and w/ normalized gait pattern 100% of the time, Navigate 3 stairs modified independent with unilateral handrail to facilitate return to previous living environment, Increase all balance 1/2 grade to decrease risk for falls, Complete exercise program independently and Tolerate 4 hr OOB to faciliate upright tolerance   Treatment Day 1   Plan   Treatment/Interventions Functional transfer training;LE strengthening/ROM; Elevations; Therapeutic exercise; Endurance training;Patient/family training;Equipment eval/education; Bed mobility;Gait training;Spoke to nursing   PT Frequency 5x/wk   Recommendation   Recommendation Short-term skilled PT   Equipment Recommended Walker   PT - OK to Discharge Yes  (when medically cleared, if to STR)   Barthel Index   Feeding 10   Bathing 0   Grooming Score 5   Dressing Score 5   Bladder Score 0   Bowels Score 10   Toilet Use Score 5   Transfers (Bed/Chair) Score 10   Mobility (Level Surface) Score 0   Stairs Score 0   Barthel Index Score 45     Physical Therapy Treatment Note  Time In: 1045  Time Out: 1057  Total Time: 12 min     S:  Pt agreeable to PT treatment session s/p PT eval, in no apparent distress, A&O x 4 and responsive      O:  Pt seen for PT treatment session this date with interventions consisting of therapeutic activity consisting of training: sit<>stand transfers, static standing tolerance for 2-3 minutes w/ B UE support and vc and tactile cues for static standing posture faciliation   VC required for technique      A:  Post session: pt returned back to recliner, chair alarm engaged, all needs in reach and RN notified of session findings/recommendations     P:  Continue to recommend STR at time of d/c in order to maximize pt's functional independence and safety w/ mobility  Pt continues to be functioning below baseline level, and remains limited 2* factors listed above  PT will continue to see pt while here in order to address the deficits listed above and provide interventions consistent w/ POC in effort to achieve STGs      Lorne Artis PT

## 2019-01-28 NOTE — PLAN OF CARE
GENITOURINARY - ADULT     Maintains or returns to baseline urinary function Progressing     Absence of urinary retention Progressing     Urinary catheter remains patent Progressing        Prexisting or High Potential for Compromised Skin Integrity     Skin integrity is maintained or improved Progressing        SAFETY ADULT     Patient will remain free of falls Progressing     Maintain or return to baseline ADL function Progressing     Maintain or return mobility status to optimal level Progressing

## 2019-01-28 NOTE — OCCUPATIONAL THERAPY NOTE
Occupational Therapy Evaluation        Patient Name: Keren Lopes  PKALP'U Date: 1/28/2019 01/28/19 1058   Note Type   Note type Eval only   Restrictions/Precautions   Weight Bearing Precautions Per Order No   Other Precautions Chair Alarm; Bed Alarm;Telemetry; Fall Risk;Multiple lines   Pain Assessment   Pain Assessment 0-10   Pain Score 4   Pain Location Abdomen   Pain Orientation Left; Lower   Pain Descriptors Aching   Pain Frequency Constant/continuous   Pain Onset Ongoing   Clinical Progression Not changed   Home Living   Type of [address] One level;Performs ADLs on one level;Stairs to enter with rails  (3 BETH)   Bathroom Shower/Tub Tub/shower unit   Bathroom Toilet Standard   Bathroom Equipment Shower chair; Toilet raiser   [address]; Wheelchair-manual;Cane  (not using any AD at baseline)   Prior Function   Level of [place] Independent with ADLs and functional mobility   Lives With Spouse  (home w/ pt)   Receives Help From Family   ADL Assistance Independent   IADLs Independent  (pt reports managing his own meds)   Falls in the last 6 months 1 to 4   Vocational Retired   Comments patient drives   Lifestyle   Autonomy Patient reporting independent with ADLs/ IADLs, lives with spouse in a one story house, 3 BETH, patient drives and manages affairs independently  Reciprocal Relationships Supportive family   Psychosocial   Psychosocial (WDL) WDL   ADL   Eating Assistance 6  Modified independent   Grooming Assistance 5  Supervision/Setup   UB Bathing Assistance 4  Minimal Assistance   LB Bathing Assistance 3  Moderate Assistance   UB Dressing Assistance 4  Minimal [address] 3  Moderate [address]  2  Maximal Assistance   Functional Assistance 3  Moderate Assistance   Bed Mobility   Rolling L 5  Supervision   Additional items Assist x 1;HOB elevated; Increased time required   Supine to Sit 4 Minimal assistance   Additional items Assist x 1;HOB elevated; Bedrails; Increased time required;Verbal cues   Transfers   Sit to Stand 4  Minimal assistance   Additional items Assist x 2; Increased time required;Verbal cues   Stand to Sit 4  Minimal assistance   Additional items Assist x 2; Increased time required;Verbal cues;Armrests   Functional Mobility   Functional Mobility 4  Minimal assistance   Additional items Rolling walker   Balance   Static Sitting Fair +   Dynamic Sitting Fair   Static Standing Fair   Dynamic Standing Fair -   Activity Tolerance   Activity Tolerance Patient limited by fatigue   Nurse Made Aware Yes, RN Donald Valencia verbalized pt appropriate for OT session, made aware of outcomes   RUE Assessment   RUE Assessment WFL   LUE Assessment   LUE Assessment WFL   Hand Function   Gross Motor Coordination Functional   Fine Motor Coordination Functional   Sensation   Light Touch Partial deficits in the RUE;Partial deficits in the LUE  (mild numbness to finger tips)   Proprioception   Proprioception No apparent deficits   Vision-Basic Assessment   Current Vision Wears glasses only for reading   Perception   Inattention/Neglect Appears intact   Cognition   Overall Cognitive Status [facility]   Arousal/Participation Alert; Responsive; Cooperative   Attention Within functional limits   Orientation Level Oriented X4   Memory Within functional limits   Following Commands Follows all commands and directions without difficulty   Assessment   Limitation Decreased ADL status; Decreased UE strength;Decreased Safe judgement during ADL;Decreased endurance;Decreased fine motor control;Decreased self-care trans;Decreased high-level ADLs   Prognosis Good   Assessment Patient is a 77 y o  male seen for OT evaluation s/p admit to [address] [place] [address] on 1/27/2019 w/Vasovagal syncope   Commorbidities affecting patient's functional performance at time of assessment include: accelerated HTN, anemia, ARF superimposed on CKD stage 3, DM, generalized weakness  Orders placed for OT evaluation and treatment  Performed at least two patient identifiers during session including name and wristband  Prior to admission, Patient reporting independent with ADLs/ IADLs, lives with spouse in a one story house, 3 BETH, patient drives and manages affairs independently  Personal factors affecting patient at time of initial evaluation include: decreased initiation and engagement, difficulty performing ADLs and difficulty performing IADLs  Upon evaluation, patient requires minimal  assist for UB ADLs, moderate assist for LB ADLs, transfers and functional ambulation in room and bathroom with moderate assist, with the use of Rolling Walker  Occupational performance is affected by the following deficits: degenerative arthritic joint changes, dynamic sit/ stand balance deficit with poor standing tolerance time for self care and functional mobility, decreased activity tolerance, decreased safety awareness, increased pain and postural control and postural alignment deficit, requiring external assistance to complete transitional movements  Therapist completed  extensive additional review of medical records and additional review of physical, cognitive or psychosocial history, clinical examination identifying 5 or more performance deficits, clinical decision making of a high complexity , consistent with a high complexity level evaluation  Patient to benefit from continued Occupational Therapy treatment while in the hospital to address deficits as defined above and maximize level of functional independence with ADLs and functional mobility  Occupational Performance areas to address include: bathing/ shower, dressing, toilet hygiene, transfer to all surfaces, functional mobility, emergency response, health maintenance, medication routine/ management, IADLs: safety procedures, IADLs: meal prep/ clean up, Leisure Participation and Social participation   From OT standpoint, recommendation at time of d/c would be Short Term Rehab  Plan   Treatment Interventions ADL retraining;Functional transfer training; Endurance training;Patient/family training;Equipment evaluation/education; Compensatory technique education;Continued evaluation;Cardiac education; Energy conservation; Activityengagement   Goal Expiration Date 02/11/19   OT Frequency 3-5x/wk   Recommendation   OT Discharge Recommendation Short Term Rehab   Barthel Index   Feeding 10   Bathing 0   Grooming Score 5   Dressing Score 5   Bladder Score 0   Bowels Score 10   Toilet Use Score 5   Transfers (Bed/Chair) Score 10   Mobility (Level Surface) Score 0   Stairs Score 0   Barthel Index Score 45   Modified Campbell Scale   Modified Campbell Scale 4     Occupational Therapy goals: In 7-14 days:     1- Patient will verbalize and demonstrate use of energy conservation/ deep breathing technique and work simplification skills during functional activity with no verbal cues  2-Patient will verbalize and demonstrate good body mechanics and joint protection techniques during  ADLs/ IADLs with no verbal cues  3- Patient will increase OOB/ sitting tolerance to 2-4 hours per day for increased participation in self care and leisure tasks with no s/s of exertion  4-Patient will increase standing tolerance time to 5  minutes with unilateral UE support to complete sink level ADLs@ mod I level   5- Patient will increase sitting tolerance at edge of bed to 20 minutes to complete UB ADLs @ set up assist level  6- Patient will transfer bed to Chair / toilet at Set up assist level with AD as indicated  7- Patient will complete UB ADLs with set up assist  8- Patient will complete LB ADLs with min assist with the use of adaptive equipment  9- Patient will complete toileting hygiene with set up assist/ supervision for thoroughness    10-Patient/ Family  will demonstrate competency with UE Home Exercise Program

## 2019-01-29 LAB
GLUCOSE SERPL-MCNC: 188 MG/DL (ref 65–140)
GLUCOSE SERPL-MCNC: 339 MG/DL (ref 65–140)
GLUCOSE SERPL-MCNC: 374 MG/DL (ref 65–140)
GLUCOSE SERPL-MCNC: 406 MG/DL (ref 65–140)

## 2019-01-29 PROCEDURE — 97535 SELF CARE MNGMENT TRAINING: CPT

## 2019-01-29 PROCEDURE — 82948 REAGENT STRIP/BLOOD GLUCOSE: CPT

## 2019-01-29 PROCEDURE — 97110 THERAPEUTIC EXERCISES: CPT

## 2019-01-29 PROCEDURE — 97530 THERAPEUTIC ACTIVITIES: CPT

## 2019-01-29 RX ADMIN — HEPARIN SODIUM 5000 UNITS: 5000 INJECTION, SOLUTION INTRAVENOUS; SUBCUTANEOUS at 15:03

## 2019-01-29 RX ADMIN — AMLODIPINE BESYLATE 5 MG: 5 TABLET ORAL at 08:36

## 2019-01-29 RX ADMIN — ASPIRIN 81 MG 81 MG: 81 TABLET ORAL at 08:36

## 2019-01-29 RX ADMIN — HEPARIN SODIUM 5000 UNITS: 5000 INJECTION, SOLUTION INTRAVENOUS; SUBCUTANEOUS at 21:25

## 2019-01-29 RX ADMIN — INSULIN LISPRO 1 UNITS: 100 INJECTION, SOLUTION INTRAVENOUS; SUBCUTANEOUS at 08:36

## 2019-01-29 RX ADMIN — INSULIN LISPRO 5 UNITS: 100 INJECTION, SOLUTION INTRAVENOUS; SUBCUTANEOUS at 17:04

## 2019-01-29 RX ADMIN — HEPARIN SODIUM 5000 UNITS: 5000 INJECTION, SOLUTION INTRAVENOUS; SUBCUTANEOUS at 05:26

## 2019-01-29 RX ADMIN — INSULIN LISPRO 6 UNITS: 100 INJECTION, SOLUTION INTRAVENOUS; SUBCUTANEOUS at 12:02

## 2019-01-29 RX ADMIN — INSULIN LISPRO 6 UNITS: 100 INJECTION, SOLUTION INTRAVENOUS; SUBCUTANEOUS at 21:25

## 2019-01-29 NOTE — PROGRESS NOTES
[address] Internal Medicine Progress Note  Patient: Jami Ivy 77 y o  male   MRN: [id number]  PCP: Tianna Cuellar DO  Unit/Bed#: -01 Encounter: [id number]  Date Of Visit: 19    Assessment:    Principal Problem:    Vasovagal syncope  Active Problems:    Weakness generalized    Diabetes mellitus ([address] )    Acute renal failure superimposed on stage 3 chronic kidney disease (HCC)    Anemia    Accelerated hypertension    Liver mass      Plan:    · 1  Syncope- mostlikley vasovagal   Resolved  · 2  Liver mass- recent biopsy done  Results pending  Patient to followup results as outpatient  · 3  SILVERIO- patient has garcia catheter in place  Mostlikely has obstructive issues  Need to followup with urology as outpatient  · 4  DM- on ISS  · 5  Dispo- awaiting rehab  VTE Pharmacologic Prophylaxis:   Pharmacologic: Heparin  Mechanical VTE Prophylaxis in Place: Yes    Patient Centered Rounds: I have performed bedside rounds with nursing staff today  Discussions with Specialists or Other Care Team Provider:     Education and Discussions with Family / Patient:     Time Spent for Care: 20 minutes  More than 50% of total time spent on counseling and coordination of care as described above  Current Length of Stay: 0 day(s)    Current Patient Status: Observation   Certification Statement: The patient will continue to require additional inpatient hospital stay due to rehab placement    Discharge Plan / Estimated Discharge Date: once rehab found    Code Status: Level 1 - Full Code      Subjective:   Patient seen and examined at bedside  Patient has no new complaints  Objective:     Vitals:   Temp (24hrs), Av 1 °F (36 7 °C), Min:97 8 °F (36 6 °C), Max:98 3 °F (36 8 °C)    Temp:  [97 8 °F (36 6 °C)-98 3 °F (36 8 °C)] 98 3 °F (36 8 °C)  HR:  [55-89] 84  Resp:  [16-19] 16  BP: (138-162)/(65-83) 139/65  SpO2:  [98 %-100 %] 98 %  Body mass index is 23 2 kg/m²       Input and Output Summary (last 24 hours): Intake/Output Summary (Last 24 hours) at 01/29/19 1419  Last data filed at 01/28/19 2235   Gross per 24 hour   Intake              425 ml   Output             1750 ml   Net            -1325 ml       Physical Exam:     Physical Exam   Constitutional: He is oriented to person, place, and time  He appears well-developed and well-nourished  HENT:   Head: Normocephalic and atraumatic  Eyes: Pupils are equal, round, and reactive to light  Conjunctivae and EOM are normal    Neck: Normal range of motion  Neck supple  No JVD present  No tracheal deviation present  No thyromegaly present  Cardiovascular: Normal rate, regular rhythm and normal heart sounds  Exam reveals no gallop  No murmur heard  Pulmonary/Chest: Effort normal and breath sounds normal  No respiratory distress  He has no wheezes  He has no rales  Abdominal: Soft  Bowel sounds are normal  He exhibits no distension  There is no tenderness  There is no rebound  Genitourinary:   Genitourinary Comments: + garcia     Musculoskeletal: Normal range of motion  He exhibits no edema  Neurological: He is alert and oriented to person, place, and time  Skin: Skin is warm and dry  No rash noted  No erythema  No pallor  Vitals reviewed  Additional Data:     Labs:      Results from last 7 days  Lab Units 01/28/19  0438  01/27/19  1252   WBC Thousand/uL 9 49  --  12 67*   HEMOGLOBIN g/dL 8 7*  --  10 1*   HEMATOCRIT % 25 0*  --  30 1*   PLATELETS Thousands/uL 166  < > 188   NEUTROS PCT %  --   --  80*   LYMPHS PCT %  --   --  7*   MONOS PCT %  --   --  8   EOS PCT %  --   --  3   < > = values in this interval not displayed      Results from last 7 days  Lab Units 01/28/19  0438   POTASSIUM mmol/L 3 9   CHLORIDE mmol/L 104   CO2 mmol/L 26   BUN mg/dL 53*   CREATININE mg/dL 3 16*   CALCIUM mg/dL 8 8   ALK PHOS U/L 96   ALT U/L 14   AST U/L 11       Results from last 7 days  Lab Units 01/27/19  1252   INR  1 04       * I Have Reviewed All Lab Data Listed Above  * Additional Pertinent Lab Tests Reviewed: [address] [facility] Admission Reviewed    Imaging:    Imaging Reports Reviewed Today Include:   Imaging Personally Reviewed by Myself Includes:      Recent Cultures (last 7 days):       Results from last 7 days  Lab Units 01/23/19 2204   C DIFF TOXIN B  NEGATIVE for C difficle toxin by PCR  Last 24 Hours Medication List:     Current Facility-Administered Medications:  acetaminophen 650 mg Oral Q6H PRN Oscar Jackson MD   amLODIPine 5 mg Oral Once [employer], PA-C   amLODIPine 5 mg Oral Daily Oscar Jackson MD   aspirin 81 mg Oral Daily Oscar Jackson MD   heparin (porcine) 5,000 Units Subcutaneous [facility] Oscar Jackson MD   insulin lispro 1-6 Units Subcutaneous TID Shana Bautista MD   insulin lispro 1-6 Units Subcutaneous HS Oscar Jackson MD        Today, Patient Was Seen By: Kary Gaming MD    ** Please Note: This note has been constructed using a voice recognition system   **

## 2019-01-29 NOTE — SOCIAL WORK
CM met with pt and family at bedside to discuss choices for STR - pt reports his preference would be [facility] or [address] and [employer] as his daughter works in [employer]  Referrals sent in [facility]

## 2019-01-29 NOTE — NUTRITION
01/29/19 0957   Assessment   Timepoint Initial  (consult: wt loss despite good appetite)   Labs   List Completed Labs (A1c Sept 2018- 7 7, , 276, 188; meds- humalog, IVF @ 75)   Feeding Route   PO Independent   Adequacy of Intake   Nutrition Modality PO  (regular)   Intake Meals %   Estimated calorie intake compared to estimated need pt was finishing his breakfast when visited  ate scrambled eggs, sausage and was finishing his last bit of oatmeal  states that he is eating well but is continuing to lose weight  he does have a liver mass with pending biopsies, which could potentially be attributing to his weight loss  offered snacks in between meals and nutritional supplements as well, pt would not directly answer whether he wanted any of these items  Nutrition Prognosis   Nutrition Concerns (wt loss despite adequate intake; skin intact; )   Comorbid Concerns (liver mass with pending biopsies, vasovagal syncope, HTN, DM, generalized weakness)   Nutrition Considerations (denied formal diet education at this time)   PES Statement   Problem No nutrition diagnosis   Additional PES details no overt need for nutrition diagnosis   Patient Nutrition Goals   Goal meet PO needs   Goal Status initiated   Timeframe to complete goal by next f/u   Recommendations/Interventions   Summary Consulted for weight loss despite adequate intake  pt could not directly quantify his weight loss  stated he has lost 4-5# since his admission, but could not give a weight hx as he does not weight himself at home  current weight was obtained by bedscale which read 73 4kg (161#) pt was skeptical that this was a correct weight, and stated "wait until they bring an actual scale in to weight me" Could not quantify any weight hx from the past 6 months as well  previous admission last week (1/23) showed a recorded weight of 155#  pt does not meet criteria for malnutrition at this time   Will continue to monitor po intake and if pt agrees to any supplements   Malnutrition/BMI Present No   Interventions Diet: continued as ordered   Nutrition Recommendations Continue diet as ordered   Nutrition Complexity Risk   Nutrition complexity level Moderate risk   Follow up date 02/04/19

## 2019-01-29 NOTE — DISCHARGE SUMMARY
Discharge Summary - [facility] Internal Medicine    Patient Information: Devorah Alejandro 77 y o  male MRN: [id number]  Unit/Bed#: -01 Encounter: [id number]    Discharging Physician / Practitioner: Chelsy Murphy MD  PCP: aKren Hopkins DO  Admission Date: 1/27/2019  Discharge Date: 01/29/19    Disposition:     Home with VNA Services (Reminder: Complete face to face encounter)     Reason for Admission: Syncope    Discharge Diagnoses:     Principal Problem:    Vasovagal syncope  Active Problems:    Weakness generalized    Diabetes mellitus ([facility] )    Acute renal failure superimposed on stage 3 chronic kidney disease ([facility] )    Anemia    Accelerated hypertension    Liver mass  Resolved Problems:    * No resolved hospital problems  *      Consultations During Hospital Stay:  · none    Procedures Performed:     · none    Significant Findings / Test Results:     ·     Incidental Findings:   ·      Test Results Pending at Discharge (will require follow up):   ·      Outpatient Tests Requested:  ·     Complications:  None        Hospital Course:     Devorah Alejandro is a 77 y o  male patient who originally presented to the hospital on 1/27/2019 due to complaints of syncope  Patient cardiac enzymes were negative and his 2d echo had a EF of 60%  Patient symptoms mostlikley secondary to vasosvagal syncope  It has resolved  Patient is currently hemodynamically stable and will be discharged home with his garcia  Patient to followup with urology as outpatient  Condition at Discharge: stable     Discharge Day Visit / Exam:     * Please refer to separate progress note for these details *    Discussion with Family:       Discharge instructions/Information to patient and family:   See after visit summary for information provided to patient and family  Provisions for Follow-Up Care:  See after visit summary for information related to follow-up care and any pertinent home health orders        Planned Readmission: none    Discharge Statement:  I spent 50 minutes discharging the patient  This time was spent on the day of discharge  I had direct contact with the patient on the day of discharge  Greater than 50% of the total time was spent examining patient, answering all patient questions, arranging and discussing plan of care with patient as well as directly providing post-discharge instructions  Additional time then spent on discharge activities  Discharge Medications:  See after visit summary for reconciled discharge medications provided to patient and family  ** Please Note: This note has been constructed using a voice recognition system   **

## 2019-01-29 NOTE — OCCUPATIONAL THERAPY NOTE
OCCUPATIONAL THERAPY TREATMENT  NOTE       01/29/19 1020   Restrictions/Precautions   Weight Bearing Precautions Per Order No   Other Precautions Chair Alarm; Bed Alarm;Multiple lines;Telemetry   Pain Assessment   Pain Assessment No/denies pain   Pain Score No Pain   Functional Standing Tolerance   Time approx 2 mins   Comments RW    Bed Mobility   Additional Comments rec'd OOB   Transfers   Sit to Stand 4  Minimal assistance   Additional items Assist x 1; Armrests; Increased time required;Verbal cues   Stand to Sit 4  Minimal assistance   Additional items Assist x 1; Armrests; Increased time required;Verbal cues   Functional Mobility   Functional Mobility 4  Minimal assistance   Additional items Rolling walker   Therapeutic Excerise-Strength   UE Strength Yes   Right Upper Extremity- Strength   R Shoulder Flexion; Extension; External rotation; Internal rotation   R Elbow Elbow flexion;Elbow extension   R Weight/Reps/Sets 10 reps x 2 ea ex   Left Upper Extremity-Strength   L Shoulder Flexion; Extension; External rotation; Internal rotation   L Elbow Elbow flexion;Elbow extension   L Weights/Reps/Sets 10 reps x 2 ea ex   Cognition   Overall Cognitive Status WFL   Arousal/Participation Alert; Cooperative   Attention Within functional limits   Orientation Level Oriented X4   Memory Within functional limits   Following Commands Follows all commands and directions without difficulty   Comments Pt agreeable to skilled OT tx   Additional Activities   Additional Activities Other (Comment)  (Ed, provided on [facility] awareness/ strategies with focus on PLB )   Activity Tolerance   Activity Tolerance Patient tolerated treatment well   Medical Staff Made Aware yes, RNOneida made aware   Assessment   Assessment Pt cooperative and agreeable to skilled OT services with focus on improving functional mobility and self care skills  Pt seated in recliner upon start of session with no c/o pain   Pt educated on use of LH AE for LB  ADL  Demonstrating good understanding of all material presented  Pt demonstrated good activity tolerance requiring occasional rest breaks during presented tasks  Pt reviewed/ performed HEP to assist with UB strengthening to assist with independence and safety during func mobility and daily routine  Pt  educated in energy conservation awareness, strategies and techniques with focus on pursed lip breathing technique to assist with pain/stress mangt  Pt able to ambulate around room using RW and Min A   Good recall of safety protocol with no vc's for hand placement  Pt performance demonstrated good carryover of learned techniques and strategies to facilitate safety during self care and daily routine, however pt continues to demonstrate deficits in the areas of activity endurance and functional mobility  Pt would continue to benefit from skilled OT POC to facilitate return to PLOF and reduced BOC for caregivers  Plan   Treatment Interventions ADL retraining;Visual perceptual retraining;Functional transfer training;UE strengthening/ROM; Endurance training;Cognitive reorientation;Patient/family training;Equipment evaluation/education; Fine motor coordination activities; Compensatory technique education;Continued evaluation; Energy conservation; Activityengagement   Goal Expiration Date 02/11/19   OT Frequency 3-5x/wk   Recommendation   OT Discharge Recommendation Short Term Rehab   OT - OK to Discharge Yes  (when medically cleared)                                                       TAWNYA Kwong/IZZY

## 2019-01-29 NOTE — PLAN OF CARE
Problem: OCCUPATIONAL THERAPY ADULT  Goal: Performs self-care activities at highest level of function for planned discharge setting  See evaluation for individualized goals  Treatment Interventions: ADL retraining, Functional transfer training, Endurance training, Patient/family training, Equipment evaluation/education, Compensatory technique education, Continued evaluation, Cardiac education, Energy conservation, Activityengagement          See flowsheet documentation for full assessment, interventions and recommendations  Outcome: Progressing  Limitation: Decreased ADL status, Decreased UE strength, Decreased Safe judgement during ADL, Decreased endurance, Decreased fine motor control, Decreased self-care trans, Decreased high-level ADLs  Prognosis: Good  Assessment: Pt cooperative and agreeable to skilled OT services with focus on improving functional mobility and self care skills  Pt seated in recliner upon start of session with no c/o pain  Pt educated on use of LH AE for LB  ADL  Demonstrating good understanding of all material presented  Pt demonstrated good activity tolerance requiring occasional rest breaks during presented tasks  Pt reviewed/ performed HEP to assist with UB strengthening to assist with independence and safety during func mobility and daily routine  Pt  educated in energy conservation awareness, strategies and techniques with focus on pursed lip breathing technique to assist with pain/stress mangt  Pt able to ambulate around room using RW and Min A   Good recall of safety protocol with no vc's for hand placement  Pt performance demonstrated good carryover of learned techniques and strategies to facilitate safety during self care and daily routine, however pt continues to demonstrate deficits in the areas of activity endurance and functional mobility  Pt would continue to benefit from skilled OT POC to facilitate return to PLOF and reduced BOC for caregivers       OT Discharge Recommendation: Short Term Rehab  OT - OK to Discharge: Yes (when medically cleared)

## 2019-01-30 VITALS
WEIGHT: 155.65 LBS | SYSTOLIC BLOOD PRESSURE: 156 MMHG | BODY MASS INDEX: 23.59 KG/M2 | DIASTOLIC BLOOD PRESSURE: 70 MMHG | TEMPERATURE: 98.3 F | OXYGEN SATURATION: 99 % | RESPIRATION RATE: 20 BRPM | HEIGHT: 68 IN | HEART RATE: 83 BPM

## 2019-01-30 LAB — GLUCOSE SERPL-MCNC: 225 MG/DL (ref 65–140)

## 2019-01-30 PROCEDURE — 82948 REAGENT STRIP/BLOOD GLUCOSE: CPT

## 2019-01-30 PROCEDURE — 99217 PR OBSERVATION CARE DISCHARGE MANAGEMENT: CPT | Performed by: INTERNAL MEDICINE

## 2019-01-30 RX ADMIN — ASPIRIN 81 MG 81 MG: 81 TABLET ORAL at 08:00

## 2019-01-30 RX ADMIN — INSULIN LISPRO 2 UNITS: 100 INJECTION, SOLUTION INTRAVENOUS; SUBCUTANEOUS at 07:58

## 2019-01-30 RX ADMIN — AMLODIPINE BESYLATE 5 MG: 5 TABLET ORAL at 08:00

## 2019-01-30 RX ADMIN — HEPARIN SODIUM 5000 UNITS: 5000 INJECTION, SOLUTION INTRAVENOUS; SUBCUTANEOUS at 05:35

## 2019-01-30 NOTE — UTILIZATION REVIEW
Notification of Discharge  This is a Notification of Discharge from our facility [address]  Please be advised that this patient has been discharge from our facility  Below you will find the admission and discharge date and time including the patients disposition  PRESENTATION DATE: 1/27/2019 11:46 AM  IP ADMISSION DATE: N/A N/A  DISCHARGE DATE: 1/30/2019 10:14 AM  DISPOSITION: Home with [address] Utilization Review Department  Phone: [phone]; Fax [phone]  [email or web address]  ATTENTION: Please call with any questions or concerns to [phone]  and carefully listen to the prompts so that you are directed to the right person  Send all requests for admission clinical reviews, approved or denied determinations and any other requests to fax [phone]   All voicemails are confidential

## 2019-01-30 NOTE — NURSING NOTE
Discharge placed for patient, IV removed instructions reviewed and given to patient/son that is present  All questions answered

## 2019-01-30 NOTE — PLAN OF CARE
Problem: SAFETY ADULT  Goal: Patient will remain free of falls  INTERVENTIONS:  - Assess patient frequently for physical needs  -  Identify cognitive and physical deficits and behaviors that affect risk of falls    -  [place] fall precautions as indicated by assessment   - Educate patient/family on patient safety including physical limitations  - Instruct patient to call for assistance with activity based on assessment  - Modify environment to reduce risk of injury  - Consider OT/PT consult to assist with strengthening/mobility   Outcome: Adequate for Discharge    Goal: Maintain or return to baseline ADL function  INTERVENTIONS:  -  Assess patient's ability to carry out ADLs; assess patient's baseline for ADL function and identify physical deficits which impact ability to perform ADLs (bathing, care of mouth/teeth, toileting, grooming, dressing, etc )  - Assess/evaluate cause of self-care deficits   - Assess range of motion  - Assess patient's mobility; develop plan if impaired  - Assess patient's need for assistive devices and provide as appropriate  - Encourage maximum independence but intervene and supervise when necessary  ¯ Involve family in performance of ADLs  ¯ Assess for home care needs following discharge   ¯ Request OT consult to assist with ADL evaluation and planning for discharge  ¯ Provide patient education as appropriate   Outcome: Adequate for Discharge    Goal: Maintain or return mobility status to optimal level  INTERVENTIONS:  - Assess patient's baseline mobility status (ambulation, transfers, stairs, etc )    - Identify cognitive and physical deficits and behaviors that affect mobility  - Identify mobility aids required to assist with transfers and/or ambulation (gait belt, sit-to-stand, lift, walker, cane, etc )  - [place] fall precautions as indicated by assessment  - Record patient progress and toleration of activity level on Mobility SBAR; progress patient to next Phase/Stage  - Instruct patient to call for assistance with activity based on assessment  - Request Rehabilitation consult to assist with strengthening/weightbearing, etc    Outcome: Adequate for Discharge      Problem: GENITOURINARY - ADULT  Goal: Maintains or returns to baseline urinary function  INTERVENTIONS:  - Assess urinary function  - Encourage oral fluids to ensure adequate hydration  - Administer IV fluids as ordered to ensure adequate hydration  - Administer ordered medications as needed  - Offer frequent toileting  - Follow urinary retention protocol if ordered   Outcome: Adequate for Discharge    Goal: Absence of urinary retention  INTERVENTIONS:  - Assess patients ability to void and empty bladder  - Monitor I/O  - Bladder scan as needed  - Discuss with physician/AP medications to alleviate retention as needed  - Discuss catheterization for long term situations as appropriate   Outcome: Adequate for Discharge

## 2019-01-30 NOTE — DISCHARGE SUMMARY
Discharge Summary - [facility] Internal Medicine    Patient Information: Lin Perez 77 y o  male MRN: [id number]  Unit/Bed#: -01 Encounter: [id number]    Discharging Physician / Practitioner: Sohan Skelton MD  PCP: Kashif Martin DO  Admission Date: 1/27/2019  Discharge Date: 01/30/19    Disposition:     Home with VNA Services (Reminder: Complete face to face encounter)     Reason for Admission: Syncope    Discharge Diagnoses:     Principal Problem:    Vasovagal syncope  Active Problems:    Weakness generalized    Diabetes mellitus ([facility] [address] )    Acute renal failure superimposed on stage 3 chronic kidney disease ([facility] [address] )    Anemia    Accelerated hypertension    Liver mass  Resolved Problems:    * No resolved hospital problems  *      Consultations During Hospital Stay:  · none    Procedures Performed:     · none    Significant Findings / Test Results:     ·     Incidental Findings:   ·      Test Results Pending at Discharge (will require follow up):   ·      Outpatient Tests Requested:  ·     Complications:  None        Hospital Course:     Lin Perez is a 77 y o  male patient who originally presented to the hospital on 1/27/2019 due to complaints of syncope  Patient cardiac enzymes were negative and his 2d echo had a EF of 60%  Patient symptoms mostlikley secondary to vasosvagal syncope  It has resolved  Patient is currently hemodynamically stable and will be discharged home with his garcia  Patient to followup with urology as outpatient  Condition at Discharge: stable     Discharge Day Visit / Exam:     * Please refer to separate progress note for these details *    Discussion with Family:       Discharge instructions/Information to patient and family:   See after visit summary for information provided to patient and family  Provisions for Follow-Up Care:  See after visit summary for information related to follow-up care and any pertinent home health orders        Planned Readmission: none    Discharge Statement:  I spent 50 minutes discharging the patient  This time was spent on the day of discharge  I had direct contact with the patient on the day of discharge  Greater than 50% of the total time was spent examining patient, answering all patient questions, arranging and discussing plan of care with patient as well as directly providing post-discharge instructions  Additional time then spent on discharge activities  Discharge Medications:  See after visit summary for reconciled discharge medications provided to patient and family  ** Please Note: This note has been constructed using a voice recognition system   **

## 2019-02-03 ENCOUNTER — APPOINTMENT (EMERGENCY)
Dept: RADIOLOGY | Facility: HOSPITAL | Age: 79
DRG: 689 | End: 2019-02-03
Payer: COMMERCIAL

## 2019-02-03 ENCOUNTER — TELEPHONE (OUTPATIENT)
Dept: OTHER | Facility: OTHER | Age: 79
End: 2019-02-03

## 2019-02-03 ENCOUNTER — HOSPITAL ENCOUNTER (INPATIENT)
Facility: HOSPITAL | Age: 79
LOS: 4 days | Discharge: NURSING, REHAB OR LONG-TERM CARE | DRG: 689 | End: 2019-02-07
Attending: EMERGENCY MEDICINE | Admitting: HOSPITALIST
Payer: COMMERCIAL

## 2019-02-03 ENCOUNTER — APPOINTMENT (EMERGENCY)
Dept: CT IMAGING | Facility: HOSPITAL | Age: 79
DRG: 689 | End: 2019-02-03
Payer: COMMERCIAL

## 2019-02-03 DIAGNOSIS — N13.4 HYDROURETER: Chronic | ICD-10-CM

## 2019-02-03 DIAGNOSIS — C7A.8 NEUROENDOCRINE CANCER (HCC): ICD-10-CM

## 2019-02-03 DIAGNOSIS — R16.0 LIVER MASS: ICD-10-CM

## 2019-02-03 DIAGNOSIS — R11.2 NAUSEA AND VOMITING: Primary | ICD-10-CM

## 2019-02-03 DIAGNOSIS — D72.829 LEUKOCYTOSIS, UNSPECIFIED TYPE: ICD-10-CM

## 2019-02-03 DIAGNOSIS — R73.9 HYPERGLYCEMIA: ICD-10-CM

## 2019-02-03 DIAGNOSIS — N18.30 ACUTE RENAL FAILURE WITH OTHER SPECIFIED PATHOLOGICAL KIDNEY LESION SUPERIMPOSED ON STAGE 3 CHRONIC KIDNEY DISEASE: Chronic | ICD-10-CM

## 2019-02-03 DIAGNOSIS — N17.8 ACUTE RENAL FAILURE WITH OTHER SPECIFIED PATHOLOGICAL KIDNEY LESION SUPERIMPOSED ON STAGE 3 CHRONIC KIDNEY DISEASE: Chronic | ICD-10-CM

## 2019-02-03 DIAGNOSIS — R42 DIZZINESS: ICD-10-CM

## 2019-02-03 DIAGNOSIS — R65.10 SIRS (SYSTEMIC INFLAMMATORY RESPONSE SYNDROME) (HCC): ICD-10-CM

## 2019-02-03 DIAGNOSIS — R53.1 WEAKNESS: ICD-10-CM

## 2019-02-03 PROBLEM — D3A.8 NEUROENDOCRINE TUMOR: Status: ACTIVE | Noted: 2019-02-03

## 2019-02-03 PROBLEM — N30.00 ACUTE CYSTITIS WITHOUT HEMATURIA: Status: ACTIVE | Noted: 2019-02-03

## 2019-02-03 LAB
ALBUMIN SERPL BCP-MCNC: 3.4 G/DL (ref 3.5–5.7)
ALP SERPL-CCNC: 101 U/L (ref 55–165)
ALT SERPL W P-5'-P-CCNC: 17 U/L (ref 7–52)
ANION GAP SERPL CALCULATED.3IONS-SCNC: 11 MMOL/L (ref 4–13)
APTT PPP: 22 SECONDS (ref 26–38)
AST SERPL W P-5'-P-CCNC: 17 U/L (ref 13–39)
ATRIAL RATE: 95 BPM
BACTERIA UR QL AUTO: ABNORMAL /HPF
BASOPHILS # BLD AUTO: 0.1 THOUSANDS/ΜL (ref 0–0.1)
BASOPHILS NFR BLD AUTO: 1 % (ref 0–2)
BILIRUB SERPL-MCNC: 0.7 MG/DL (ref 0.2–1)
BILIRUB UR QL STRIP: NEGATIVE
BUN SERPL-MCNC: 67 MG/DL (ref 7–25)
CALCIUM SERPL-MCNC: 9.2 MG/DL (ref 8.6–10.5)
CHLORIDE SERPL-SCNC: 96 MMOL/L (ref 98–107)
CLARITY UR: ABNORMAL
CO2 SERPL-SCNC: 25 MMOL/L (ref 21–31)
COLOR UR: YELLOW
CREAT SERPL-MCNC: 3.06 MG/DL (ref 0.7–1.3)
EOSINOPHIL # BLD AUTO: 0 THOUSAND/ΜL (ref 0–0.61)
EOSINOPHIL NFR BLD AUTO: 0 % (ref 0–5)
ERYTHROCYTE [DISTWIDTH] IN BLOOD BY AUTOMATED COUNT: 12.9 % (ref 11.5–14.5)
FLUAV AG SPEC QL IA: NEGATIVE
FLUBV AG SPEC QL IA: NEGATIVE
GFR SERPL CREATININE-BSD FRML MDRD: 18 ML/MIN/1.73SQ M
GLUCOSE SERPL-MCNC: 272 MG/DL (ref 65–140)
GLUCOSE SERPL-MCNC: 336 MG/DL (ref 65–140)
GLUCOSE SERPL-MCNC: 358 MG/DL (ref 65–140)
GLUCOSE SERPL-MCNC: 385 MG/DL (ref 65–140)
GLUCOSE SERPL-MCNC: 387 MG/DL (ref 65–140)
GLUCOSE SERPL-MCNC: 394 MG/DL (ref 65–140)
GLUCOSE SERPL-MCNC: 396 MG/DL (ref 65–140)
GLUCOSE SERPL-MCNC: 409 MG/DL (ref 65–99)
GLUCOSE UR STRIP-MCNC: ABNORMAL MG/DL
HCT VFR BLD AUTO: 28.5 % (ref 36.5–49.3)
HGB BLD-MCNC: 9.5 G/DL (ref 14–18)
HGB UR QL STRIP.AUTO: ABNORMAL
INR PPP: 1.23 (ref 0.9–1.5)
KETONES UR STRIP-MCNC: NEGATIVE MG/DL
LACTATE SERPL-SCNC: 1.8 MMOL/L (ref 0.5–2)
LEUKOCYTE ESTERASE UR QL STRIP: ABNORMAL
LYMPHOCYTES # BLD AUTO: 0.4 THOUSANDS/ΜL (ref 0.6–4.47)
LYMPHOCYTES NFR BLD AUTO: 2 % (ref 21–51)
MCH RBC QN AUTO: 30.1 PG (ref 26–34)
MCHC RBC AUTO-ENTMCNC: 33.5 G/DL (ref 31–37)
MCV RBC AUTO: 90 FL (ref 81–99)
MONOCYTES # BLD AUTO: 1.7 THOUSAND/ΜL (ref 0.17–1.22)
MONOCYTES NFR BLD AUTO: 10 % (ref 2–12)
NEUTROPHILS # BLD AUTO: 14.7 THOUSANDS/ΜL (ref 1.4–6.5)
NEUTS SEG NFR BLD AUTO: 87 % (ref 42–75)
NITRITE UR QL STRIP: POSITIVE
NON-SQ EPI CELLS URNS QL MICRO: ABNORMAL /HPF
NRBC BLD AUTO-RTO: 0 /100 WBCS
P AXIS: 54 DEGREES
PH UR STRIP.AUTO: 5.5 [PH] (ref 5–8)
PLATELET # BLD AUTO: 168 THOUSANDS/UL (ref 149–390)
PMV BLD AUTO: 9.9 FL (ref 8.6–11.7)
POTASSIUM SERPL-SCNC: 4.3 MMOL/L (ref 3.5–5.5)
PR INTERVAL: 148 MS
PROCALCITONIN SERPL-MCNC: 0.9 NG/ML
PROT SERPL-MCNC: 6.8 G/DL (ref 6.4–8.9)
PROT UR STRIP-MCNC: ABNORMAL MG/DL
PROTHROMBIN TIME: 14.3 SECONDS (ref 10.2–13)
QRS AXIS: -63 DEGREES
QRSD INTERVAL: 96 MS
QT INTERVAL: 374 MS
QTC INTERVAL: 469 MS
RBC # BLD AUTO: 3.17 MILLION/UL (ref 4.3–5.9)
RBC #/AREA URNS AUTO: ABNORMAL /HPF
SODIUM SERPL-SCNC: 132 MMOL/L (ref 134–143)
SP GR UR STRIP.AUTO: 1.01 (ref 1–1.03)
T WAVE AXIS: 105 DEGREES
UROBILINOGEN UR QL STRIP.AUTO: 0.2 E.U./DL
VENTRICULAR RATE: 95 BPM
WBC # BLD AUTO: 16.8 THOUSAND/UL (ref 4.8–10.8)
WBC #/AREA URNS AUTO: ABNORMAL /HPF

## 2019-02-03 PROCEDURE — 96366 THER/PROPH/DIAG IV INF ADDON: CPT

## 2019-02-03 PROCEDURE — 87186 SC STD MICRODIL/AGAR DIL: CPT | Performed by: EMERGENCY MEDICINE

## 2019-02-03 PROCEDURE — 87086 URINE CULTURE/COLONY COUNT: CPT | Performed by: EMERGENCY MEDICINE

## 2019-02-03 PROCEDURE — 85730 THROMBOPLASTIN TIME PARTIAL: CPT | Performed by: EMERGENCY MEDICINE

## 2019-02-03 PROCEDURE — 96374 THER/PROPH/DIAG INJ IV PUSH: CPT

## 2019-02-03 PROCEDURE — 83605 ASSAY OF LACTIC ACID: CPT | Performed by: EMERGENCY MEDICINE

## 2019-02-03 PROCEDURE — 99285 EMERGENCY DEPT VISIT HI MDM: CPT

## 2019-02-03 PROCEDURE — 82948 REAGENT STRIP/BLOOD GLUCOSE: CPT

## 2019-02-03 PROCEDURE — 87631 RESP VIRUS 3-5 TARGETS: CPT | Performed by: EMERGENCY MEDICINE

## 2019-02-03 PROCEDURE — 93005 ELECTROCARDIOGRAM TRACING: CPT

## 2019-02-03 PROCEDURE — 93010 ELECTROCARDIOGRAM REPORT: CPT | Performed by: INTERNAL MEDICINE

## 2019-02-03 PROCEDURE — 85025 COMPLETE CBC W/AUTO DIFF WBC: CPT | Performed by: EMERGENCY MEDICINE

## 2019-02-03 PROCEDURE — 71045 X-RAY EXAM CHEST 1 VIEW: CPT

## 2019-02-03 PROCEDURE — 84145 PROCALCITONIN (PCT): CPT | Performed by: EMERGENCY MEDICINE

## 2019-02-03 PROCEDURE — 85610 PROTHROMBIN TIME: CPT | Performed by: EMERGENCY MEDICINE

## 2019-02-03 PROCEDURE — 81001 URINALYSIS AUTO W/SCOPE: CPT | Performed by: EMERGENCY MEDICINE

## 2019-02-03 PROCEDURE — 0T9B70Z DRAINAGE OF BLADDER WITH DRAINAGE DEVICE, VIA NATURAL OR ARTIFICIAL OPENING: ICD-10-PCS | Performed by: INTERNAL MEDICINE

## 2019-02-03 PROCEDURE — 96365 THER/PROPH/DIAG IV INF INIT: CPT

## 2019-02-03 PROCEDURE — 80053 COMPREHEN METABOLIC PANEL: CPT | Performed by: EMERGENCY MEDICINE

## 2019-02-03 PROCEDURE — 74176 CT ABD & PELVIS W/O CONTRAST: CPT

## 2019-02-03 PROCEDURE — 87147 CULTURE TYPE IMMUNOLOGIC: CPT | Performed by: EMERGENCY MEDICINE

## 2019-02-03 PROCEDURE — 99223 1ST HOSP IP/OBS HIGH 75: CPT | Performed by: INTERNAL MEDICINE

## 2019-02-03 PROCEDURE — 36415 COLL VENOUS BLD VENIPUNCTURE: CPT | Performed by: EMERGENCY MEDICINE

## 2019-02-03 PROCEDURE — 87040 BLOOD CULTURE FOR BACTERIA: CPT | Performed by: EMERGENCY MEDICINE

## 2019-02-03 RX ORDER — HYDRALAZINE HYDROCHLORIDE 20 MG/ML
5 INJECTION INTRAMUSCULAR; INTRAVENOUS EVERY 6 HOURS PRN
Status: DISCONTINUED | OUTPATIENT
Start: 2019-02-03 | End: 2019-02-07 | Stop reason: HOSPADM

## 2019-02-03 RX ORDER — VANCOMYCIN HYDROCHLORIDE 1 G/200ML
1000 INJECTION, SOLUTION INTRAVENOUS ONCE
Status: COMPLETED | OUTPATIENT
Start: 2019-02-03 | End: 2019-02-03

## 2019-02-03 RX ORDER — SODIUM CHLORIDE 9 MG/ML
250 INJECTION, SOLUTION INTRAVENOUS CONTINUOUS
Status: DISCONTINUED | OUTPATIENT
Start: 2019-02-03 | End: 2019-02-03

## 2019-02-03 RX ORDER — DEXTROSE AND SODIUM CHLORIDE 5; .9 G/100ML; G/100ML
250 INJECTION, SOLUTION INTRAVENOUS CONTINUOUS
Status: DISCONTINUED | OUTPATIENT
Start: 2019-02-03 | End: 2019-02-03

## 2019-02-03 RX ORDER — INSULIN GLARGINE 100 [IU]/ML
10 INJECTION, SOLUTION SUBCUTANEOUS
Status: DISCONTINUED | OUTPATIENT
Start: 2019-02-03 | End: 2019-02-04 | Stop reason: SDUPTHER

## 2019-02-03 RX ORDER — ONDANSETRON 2 MG/ML
4 INJECTION INTRAMUSCULAR; INTRAVENOUS EVERY 6 HOURS PRN
Status: DISCONTINUED | OUTPATIENT
Start: 2019-02-03 | End: 2019-02-07 | Stop reason: HOSPADM

## 2019-02-03 RX ORDER — ACETAMINOPHEN 325 MG/1
650 TABLET ORAL EVERY 4 HOURS PRN
Status: DISCONTINUED | OUTPATIENT
Start: 2019-02-03 | End: 2019-02-07 | Stop reason: HOSPADM

## 2019-02-03 RX ORDER — SODIUM CHLORIDE 9 MG/ML
1000 INJECTION, SOLUTION INTRAVENOUS CONTINUOUS
Status: DISCONTINUED | OUTPATIENT
Start: 2019-02-03 | End: 2019-02-03

## 2019-02-03 RX ORDER — AMLODIPINE BESYLATE 5 MG/1
5 TABLET ORAL DAILY
Status: DISCONTINUED | OUTPATIENT
Start: 2019-02-03 | End: 2019-02-07 | Stop reason: HOSPADM

## 2019-02-03 RX ORDER — CEFTRIAXONE 1 G/50ML
1000 INJECTION, SOLUTION INTRAVENOUS ONCE
Status: COMPLETED | OUTPATIENT
Start: 2019-02-03 | End: 2019-02-03

## 2019-02-03 RX ORDER — SODIUM CHLORIDE 9 MG/ML
75 INJECTION, SOLUTION INTRAVENOUS ONCE
Status: COMPLETED | OUTPATIENT
Start: 2019-02-03 | End: 2019-02-03

## 2019-02-03 RX ORDER — CEFTRIAXONE 1 G/50ML
1000 INJECTION, SOLUTION INTRAVENOUS EVERY 24 HOURS
Status: DISCONTINUED | OUTPATIENT
Start: 2019-02-04 | End: 2019-02-04

## 2019-02-03 RX ORDER — SODIUM CHLORIDE 9 MG/ML
500 INJECTION, SOLUTION INTRAVENOUS CONTINUOUS
Status: DISCONTINUED | OUTPATIENT
Start: 2019-02-03 | End: 2019-02-03

## 2019-02-03 RX ADMIN — SODIUM CHLORIDE 75 ML/HR: 9 INJECTION, SOLUTION INTRAVENOUS at 15:21

## 2019-02-03 RX ADMIN — AMLODIPINE BESYLATE 5 MG: 5 TABLET ORAL at 16:09

## 2019-02-03 RX ADMIN — INSULIN GLARGINE 10 UNITS: 100 INJECTION, SOLUTION SUBCUTANEOUS at 22:08

## 2019-02-03 RX ADMIN — INSULIN LISPRO 4 UNITS: 100 INJECTION, SOLUTION INTRAVENOUS; SUBCUTANEOUS at 22:17

## 2019-02-03 RX ADMIN — CEFTRIAXONE 1000 MG: 1 INJECTION, SOLUTION INTRAVENOUS at 11:42

## 2019-02-03 RX ADMIN — VANCOMYCIN HYDROCHLORIDE 1000 MG: 1 INJECTION, SOLUTION INTRAVENOUS at 10:02

## 2019-02-03 RX ADMIN — SODIUM CHLORIDE 6 UNITS/HR: 9 INJECTION, SOLUTION INTRAVENOUS at 13:12

## 2019-02-03 RX ADMIN — SODIUM CHLORIDE 1000 ML: 0.9 INJECTION, SOLUTION INTRAVENOUS at 09:59

## 2019-02-03 RX ADMIN — INSULIN LISPRO 3 UNITS: 100 INJECTION, SOLUTION INTRAVENOUS; SUBCUTANEOUS at 16:09

## 2019-02-03 NOTE — ED PROVIDER NOTES
History  Chief Complaint   Patient presents with   [facility] - 7 years or greater     pt reports he has been ill for several days, had syncopal episode today, vomited a couple of times  reports extreme weakness  78M with hx of CKD, 2 recent admissions for fatigue, syncope, ARF due to obstructing abd mass in the process of being working up,  who presents to the ER with the complaint of fatigue and weakness x 3 days  He endorses not being able to tolerate po and has decreased urine in his indwelling garcia  ROS otherwise limited by patient  Admitted from 1/23-1/25 for renal failure due to abd mass  Biopsied and found to to be neuroendocrine mass  Has not followed up with oncology yet  Garcia placed and patients renal function improved  Admitted from 1/27-1/30 for diarrhea and syncope  History provided by:  Patient and EMS personnel  Fatigue   Severity:  Moderate  Onset quality:  Gradual  Duration:  3 days  Timing:  Constant  Progression:  Worsening  Chronicity:  Recurrent  Context: recent infection and stress    Relieved by:  None tried  Worsened by:  Nothing  Ineffective treatments:  None tried  Associated symptoms: dizziness, nausea and vomiting        Prior to Admission Medications   Prescriptions Last Dose Informant Patient Reported? Taking? amLODIPine (NORVASC) 5 mg tablet   No Yes   Sig: Take 1 tablet (5 mg total) by mouth daily   aspirin 81 MG tablet   Yes Yes   Sig: Take 81 mg by mouth daily   glipiZIDE (GLUCOTROL) 5 mg tablet   No Yes   Sig: Take 0 5 tablets (2 5 mg total) by mouth 2 (two) times a day before meals      Facility-Administered Medications: None       Past Medical History:   Diagnosis Date    Cardiac disease     Diabetes mellitus ([address] )     Hyperlipidemia     Hypertension        Past Surgical History:   Procedure Laterality Date    IR IMAGE GUIDED BIOPSY/ASPIRATION LIVER  1/24/2019       History reviewed  No pertinent family history    I have reviewed and agree with the history as documented  Social History   Substance Use Topics    Smoking status: Never Smoker    Smokeless tobacco: Never Used    Alcohol use No        Review of Systems   Unable to perform ROS: Age   Constitutional: Positive for fatigue  Gastrointestinal: Positive for nausea and vomiting  Neurological: Positive for dizziness  Physical Exam  Physical Exam   Constitutional: He appears listless  He has a sickly appearance  He appears ill  No distress  Deconditioned appearing     HENT:   Head: Normocephalic and atraumatic  Eyes: Pupils are equal, round, and reactive to light  EOM are normal    Neck: Normal range of motion  Cardiovascular: Normal rate, regular rhythm and normal heart sounds  No murmur heard  Pulmonary/Chest: Effort normal  Tachypnea noted  No respiratory distress  He has no wheezes  Abdominal: Soft  Bowel sounds are normal  There is no tenderness  Musculoskeletal: Normal range of motion  Neurological: He appears listless  Skin: Skin is warm and dry  Capillary refill takes less than 2 seconds  Nursing note and vitals reviewed        Vital Signs  ED Triage Vitals   Temperature Pulse Respirations Blood Pressure SpO2   02/03/19 0905 02/03/19 0900 02/03/19 0900 02/03/19 0900 02/03/19 0900   (!) 97 4 °F (36 3 °C) 91 (!) 23 106/63 97 %      Temp Source Heart Rate Source Patient Position - Orthostatic VS BP Location FiO2 (%)   02/03/19 0905 -- 02/03/19 0905 02/03/19 0905 --   Temporal  Lying Left arm       Pain Score       02/03/19 0905       No Pain           Vitals:    02/03/19 0915 02/03/19 1100 02/03/19 1115 02/03/19 1130   BP:       Pulse: 93 84 82 85   Patient Position - Orthostatic VS:           Visual Acuity  Visual Acuity      Most Recent Value   L Pupil Size (mm)  3   R Pupil Size (mm)  3          ED Medications  Medications   ondansetron (ZOFRAN) injection 4 mg (not administered)   acetaminophen (TYLENOL) tablet 650 mg (not administered)   enoxaparin (LOVENOX) subcutaneous injection 40 mg (not administered)   sodium chloride 0 9 % infusion (not administered)     Followed by   sodium chloride 0 9 % infusion (not administered)     Followed by   sodium chloride 0 9 % infusion (not administered)   insulin regular (HumuLIN R,NovoLIN R) 1 Units/mL in sodium chloride 0 9 % 100 mL infusion (not administered)   dextrose 5 % and sodium chloride 0 9 % infusion (not administered)   cefTRIAXone (ROCEPHIN) IVPB (premix) 1,000 mg (1,000 mg Intravenous New Bag 2/3/19 1142)   vancomycin (VANCOCIN) IVPB (premix) 1,000 mg (0 mg Intravenous Stopped 2/3/19 1141)   sodium chloride 0 9 % bolus 1,000 mL (1,000 mL Intravenous New Bag 2/3/19 0959)       Diagnostic Studies  Results Reviewed     Procedure Component Value Units Date/Time    Beta Hydroxybutyrate [[id number]]     Lab Status:  No result Specimen:  Blood     Protime-INR [[id number]]  (Abnormal) Collected:  02/03/19 0910    Lab Status:  Final result Specimen:  Blood from Arm, Right Updated:  02/03/19 1003     Protime 14 3 (H) seconds      INR 1 23    APTT [[id number]]  (Abnormal) Collected:  02/03/19 0910    Lab Status:  Final result Specimen:  Blood from Arm, Right Updated:  02/03/19 1003     PTT 22 (L) seconds     Lactic acid x2 [[id number]]  (Normal) Collected:  02/03/19 0928    Lab Status:  Final result Specimen:  Blood Updated:  02/03/19 1003     LACTIC ACID 1 8 mmol/L     Narrative:         Result may be elevated if tourniquet was used during collection      Comprehensive metabolic panel [[id number]]  (Abnormal) Collected:  02/03/19 0910    Lab Status:  Final result Specimen:  Blood from Arm, Right Updated:  02/03/19 1003     Sodium 132 (L) mmol/L      Potassium 4 3 mmol/L      Chloride 96 (L) mmol/L      CO2 25 mmol/L      ANION GAP 11 mmol/L      BUN 67 (H) mg/dL      Creatinine 3 06 (H) mg/dL      Glucose 409 (H) mg/dL      Calcium 9 2 mg/dL      AST 17 U/L      ALT 17 U/L      Alkaline Phosphatase 101 U/L      Total Protein 6 8 g/dL Albumin 3 4 (L) g/dL      Total Bilirubin 0 70 mg/dL      eGFR 18 ml/min/1 73sq m     Narrative:         National Kidney Disease Education Program recommendations are as follows:  GFR calculation is accurate only with a steady state creatinine  Chronic Kidney disease less than 60 ml/min/1 73 sq  meters  Kidney failure less than 15 ml/min/1 73 sq  meters  Rapid Influenza Screen with Reflex PCR [[id number]]  (Normal) Collected:  02/03/19 0915    Lab Status:  Final result Specimen:  Nasopharyngeal from Nasopharyngeal Swab Updated:  02/03/19 0951     Rapid Influenza A Ag Negative     Rapid Influenza B Ag Negative    INFLUENZA A/B AND RSV, PCR [[id number]] Collected:  02/03/19 0915    Lab Status: In process Specimen:  Nasopharyngeal from Nasopharyngeal Swab Updated:  02/03/19 0951    CBC and differential [[id number]]  (Abnormal) Collected:  02/03/19 0910    Lab Status:  Final result Specimen:  Blood from Arm, Right Updated:  02/03/19 0936     WBC 16 80 (H) Thousand/uL      RBC 3 17 (L) Million/uL      Hemoglobin 9 5 (L) g/dL      Hematocrit 28 5 (L) %      MCV 90 fL      MCH 30 1 pg      MCHC 33 5 g/dL      RDW 12 9 %      MPV 9 9 fL      Platelets 054 Thousands/uL      nRBC 0 /100 WBCs      Neutrophils Relative 87 (H) %      Lymphocytes Relative 2 (L) %      Monocytes Relative 10 %      Eosinophils Relative 0 %      Basophils Relative 1 %      Neutrophils Absolute 14 70 (H) Thousands/µL      Lymphocytes Absolute 0 40 (L) Thousands/µL      Monocytes Absolute 1 70 (H) Thousand/µL      Eosinophils Absolute 0 00 Thousand/µL      Basophils Absolute 0 10 Thousands/µL     UA w Reflex to Microscopic w Reflex to Culture [[id number]] Updated:  02/03/19 0930    Lab Status:  No result Specimen:  Urine from Urine, Indwelling Haile Catheter     Procalcitonin [[id number]] Collected:  02/03/19 0910    Lab Status:   In process Specimen:  Blood from Arm, Right Updated:  02/03/19 0928    Blood culture #2 [[id number]] Collected:  02/03/19 1569    Lab Status: In process Specimen:  Blood from Arm, Right Updated:  02/03/19 0927    Blood culture #1 [[id number]] Collected:  02/03/19 0918    Lab Status: In process Specimen:  Blood from Arm, Left Updated:  02/03/19 0927    Lactic acid x2 [[id number]] Collected:  02/03/19 0910    Lab Status:  No result Specimen:  Blood from Arm, Right     Fingerstick Glucose (POCT) [[id number]]  (Abnormal) Collected:  02/03/19 0858    Lab Status:  Final result Updated:  02/03/19 0900     POC Glucose 396 (H) mg/dl                  CT abdomen pelvis wo contrast   Final Result by Camryn Sams MD (02/03 1121)         1  No significant change in appearance of calcified mesenteric mass, appearance suspicious for carcinoid tumor  2   No significant change in multiple hypoattenuating lesions within the liver, biopsy-proven metastatic neuroendocrine tumor  3   No significant change in bilateral mild to moderate hydroureteronephrosis  No calculi identified  4   Unchanged circumferential bladder wall thickening  Workstation performed: [id number]         XR chest portable    (Results Pending)              Procedures  Procedures       Phone Contacts  ED Phone Contact    ED Course  ED Course as of Feb 03 1221   Sun Feb 03, 2019   8721 Pt seen and examined  Appears dry and ill  Will start sepsis work up at this time and cover with vanc + cefepime  1L NS ordered  Multiple possible sources since pt now has a garcia since his last admission and now has a cough as well  Will send for flu swab too  3408 EKG with 95bmp  NSR  LVH with repolarization abnormality, unchanged from prior     1140 CT abd grossly unchanged from prior  Renal function at baseline  Discussed with family and patient that he will need to be admitted for inability to tolerate po and SIRS (suspect urine as source)  Discussed patient with hospital medicine for admission       1206 Discussed with hospital APC, patients with insulin drip cannot be on med surg   Transfer order placed                                 MDM  Number of Diagnoses or Management Options  Dizziness: established and worsening  Leukocytosis, unspecified type: new and requires workup  Liver mass: established and worsening  Nausea and vomiting: new and requires workup  SIRS (systemic inflammatory response syndrome) ([facility] [address] ): new and requires workup  Weakness: established and worsening     Amount and/or Complexity of Data Reviewed  Clinical lab tests: ordered and reviewed  Tests in the radiology section of CPT®: ordered and reviewed  Decide to obtain previous medical records or to obtain history from someone other than the patient: yes  Obtain history from someone other than the patient: yes  Review and summarize past medical records: yes  Discuss the patient with other providers: yes  Independent visualization of images, tracings, or specimens: yes    Risk of Complications, Morbidity, and/or Mortality  Presenting problems: moderate    Patient Progress  Patient progress: stable      Disposition  Final diagnoses:   Liver mass   Nausea and vomiting   Dizziness   Weakness   SIRS (systemic inflammatory response syndrome) (HCC)   Leukocytosis, unspecified type   Hyperglycemia     Time reflects when diagnosis was documented in both MDM as applicable and the Disposition within this note     Time User Action Codes Description Comment    2/3/2019 11:42 AM Jacqulynn Seymour Add [R16 0] Liver mass     2/3/2019 11:43 AM Jacqulynn Mackinac Add [R11 2] Nausea and vomiting     2/3/2019 11:43 AM Jacqulynn Seymour Add [R42] Dizziness     2/3/2019 11:43 AM Jacqulynn Mackinac Modify [R16 0] Liver mass     2/3/2019 11:43 AM Jacqulynn Mackinac Modify [R11 2] Nausea and vomiting     2/3/2019 11:43 AM Jacqulynn Mackinac Add [R53 1] Weakness     2/3/2019 11:43 AM Jacqulynn Mackinac Add [R65 10] SIRS (systemic inflammatory response syndrome) [facility]     2/3/2019 11:43 AM Jacqulynn Seymour Add [D72 829] Leukocytosis, unspecified type     2/3/2019 12:18 PM Thelma Records Add [R73 9] Hyperglycemia       ED Disposition     ED Disposition Condition Date/Time Comment    Admit  Sun Feb 3, 2019 11:42 AM Case was discussed with Dr Artis Blackman and the patient's admission status was agreed to be Admission Status: inpatient status to the service of Dr Artis Blackman    Follow-up Information    None         Patient's Medications   Discharge Prescriptions    No medications on file     No discharge procedures on file      ED Provider  Electronically Signed by           Gris Henao, DO  02/03/19 335 Chelle Brown, DO  02/03/19 1221

## 2019-02-03 NOTE — TELEPHONE ENCOUNTER
[address] [facility] oncologist (approximated)  CONFIDENTIALTY NOTICE: This fax transmission is intended only for the addressee  It contains information that is legally privileged,  confidential or otherwise protected from use or disclosure  If you are not the intended recipient, you are strictly prohibited from reviewing,  disclosing, copying using or disseminating any of this information or taking any action in reliance on or regarding this information  If you have  received this fax in error, please notify us immediately by telephone so that we can arrange for its return to us  Page: 1  2  Call Id: [id number]  Health Call  Standard Call Report  Health Call  Patient Name: Max Zavala oncologist  Gender: Female  : (approximated)  Age:  Return Phone  Number: [phone] (Work)  Address:  City/State/Zip:  Practice Name: [address] [place] [address]  Practice Charged:  Physician:  Briana Esparza Name:  Relationship To  Patient:  Return Phone Number: Unavailable  Presenting Problem: Routone Consult  Service Type: Consults  Charged Service 1: [address] Name and  Number:  Nurse Assessment  Nurse: Mira Vallejo Date/Time: 2/3/2019 5:08:18 PM  Type of assessment required:  ---Consult  DateTime called Rodolfo Jackson Name  ---2-3-19 15:27 / Aruna Villatoro of appointment:  ---Routine Consult  Facility/Unit/Room Number/Unit Phone Number  ---[facility] ICU [facility] Bed  [phone]  Practice consulted:  ---Medical Oncologist  Requesting physician:  ---Dr Raphael Ballesteros  Patient's name//Medical Record Number  ---Manolo Kent / 02- / [id number]  Admitting diagnosis/Reason for consult  ---Neuro Endocrine Cancer/ Neuro Endocrine Cancer  Physician Notified/DateTime:  ---Am / In [address] oncologist (approximated)  CONFIDENTIALTY NOTICE: This fax transmission is intended only for the addressee   It contains information that is legally privileged,  confidential or otherwise protected from use or disclosure  If you are not the intended recipient, you are strictly prohibited from reviewing,  disclosing, copying using or disseminating any of this information or taking any action in reliance on or regarding this information  If you have  received this fax in error, please notify us immediately by telephone so that we can arrange for its return to us  Page: 2 of 2  Call Id: [id number]  Protocols  Protocol Title Nurse Date/Time  Disp   Time Disposition Final User  2/3/2019 5:26:05 PM Close Yes Silvano Angulo

## 2019-02-03 NOTE — H&P
H&P- Suhasbrenda Jose 1940, 77 y o  male MRN: [id number]    Unit/Bed#: ICU 01 Encounter: [id number]    Primary Care Provider: Vanessa Sheppard DO   Date and time admitted to hospital: 2/3/2019  8:54 AM        * Acute cystitis without hematuria   Assessment & Plan    · Patient with Haile catheter placed a few weeks ago due to obstruction  · Will replace Haile catheter  · Continue IV antibiotics  · Follow up cultures  · Follow-up procalcitonin level  · IV fluids as needed     Acute renal failure superimposed on stage 3 chronic kidney disease ([facility] [address] )   Assessment & Plan    · Patient with CT abdomen/pelvis showing bilateral mild hydroureteronephrosis which is unchanged from previous CT scan done a few weeks ago  · Will continue IV fluids  · Replaced Haile catheter due to infection  · Will consult Nephrology and Urology  · Monitor kidney function     Neuroendocrine tumor   Assessment & Plan    · Patient with abdominal mesenteric mass with lesions in the liver  · Patient had biopsy of liver lesion on 01/24/19  · 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  · Patient was unaware of results and I did give the results to the patient today  · Spoke to Dr Cathleen Wiseman over the phone regarding biopsy results  He stated that he will see the patient in the office as an outpatient no need for any urgent intervention at this time  · Patient is not having any diarrhea at this time however if patient does develop diarrhea that is uncontrolled and non infectious could be related to patient's tumor and may benefit from octreotide     Anemia   Assessment & Plan    · Likely anemia of chronic disease  · Will monitor H&H  · Transfuse as needed     Hypertension   Assessment & Plan    · BP noted to be elevated  · Will continue amlodipine  · Hydralazine p r n       Diabetes mellitus ([facility] )   Assessment & Plan    · Does not appear to be in DKA at this time as bicarb level is normal  · Will continue insulin sliding scale  · Fingersticks a c  And HS  · Diabetic diet  · Hold oral medications       VTE Prophylaxis: Enoxaparin (Lovenox)  Code Status: full  POLST: There is no POLST form on file for this patient (pre-hospital)  Discussion with family:  Spoke to family at bedside    Anticipated Length of Stay:  Patient will be admitted on an Inpatient basis with an anticipated length of stay of  > 2 midnights  Justification for Hospital Stay:  UTI    Total Time for Visit, including Counseling / Coordination of Care: 45 minutes  Greater than 50% of this total time spent on direct patient counseling and coordination of care  Chief Complaint:   Generalized weakness    History of Present Illness:    Trinh Castro is a 77 y o  male who presents with generalized weakness  Patient states that over the last few days he has been feeling significant weakness and unable to get around as usual   Decreased appetite  Patient was recently at [facility] where he was evaluated for vasovagal syncope  Workup was negative and patient was discharged home  Since then patient states that he has been feeling weak and fatigued  Denies any fever or chills  No cough  Intermittent nausea  Intermittent lower abdominal pain, cramping/aching, 7/10 at worst, nonradiating, no alleviating or exacerbating factors  Patient does have a Haile catheter in place which was put in due to urinary obstruction on 01/23/2018  Patient has been unable to follow up with his PCP or Nephrology/Urology due to his recent admission  Patient did have a biopsy of liver lesion given his newly found abdominal mass and liver lesions  Biopsy result showed well-differentiated neuroendocrine tumor  In the ER patient was suspected to be in DKA and started on insulin drip  At this time insulin drip has been discontinued as patient does not appear to be in DKA will continue insulin sliding scale and monitor      Review of Systems:  Review of Systems Constitutional: Positive for activity change, appetite change and fatigue  Negative for fever  Respiratory: Positive for cough and shortness of breath  Cardiovascular: Negative for chest pain  Gastrointestinal: Positive for abdominal pain and nausea  Negative for blood in stool and diarrhea  Genitourinary: Positive for penile pain  Neurological: Positive for weakness  All other systems reviewed and are negative  Past Medical and Surgical History:   Past Medical History:   Diagnosis Date    Cardiac disease     Diabetes mellitus ([address] )     Hyperlipidemia     Hypertension        Past Surgical History:   Procedure Laterality Date    IR IMAGE GUIDED BIOPSY/ASPIRATION LIVER  1/24/2019       Meds/Allergies:  Prior to Admission medications    Medication Sig Start Date End Date Taking? Authorizing Provider   amLODIPine (NORVASC) 5 mg tablet Take 1 tablet (5 mg total) by mouth daily 1/26/19  Yes Jean Leroy MD   aspirin 81 MG tablet Take 81 mg by mouth daily   Yes Historical Provider, MD   glipiZIDE (GLUCOTROL) 5 mg tablet Take 0 5 tablets (2 5 mg total) by mouth 2 (two) times a day before meals 1/25/19  Yes Jean Leroy MD     I have reviewed home medications with patient personally  Allergies: No Known Allergies    Social History:  Marital Status:     Occupation:  None  Patient Pre-hospital Living Situation:  Lives with family  Patient Pre-hospital Level of Mobility:  Minimal ambulation with walker  Patient Pre-hospital Diet Restrictions:  None  Substance Use History:     History   Alcohol Use No     History   Smoking Status    Never Smoker   Smokeless Tobacco    Never Used     History   Drug Use No       Family History:  I have reviewed the patients family history    Physical Exam:   Vitals:   Blood Pressure: 118/58 (02/03/19 1515)  Pulse: 80 (02/03/19 1515)  Temperature: 98 6 °F (37 °C) (02/03/19 1445)  Temp Source: Tympanic (02/03/19 1445)  Respirations: 20 (02/03/19 1515)  Height: 5' 8" (172 7 cm) (02/03/19 0905)  Weight - Scale: 68 5 kg (151 lb) (02/03/19 0905)  SpO2: 98 % (02/03/19 1515)    Physical Exam   Constitutional: No distress  Frail elderly male   HENT:   Head: Normocephalic and atraumatic  Eyes: Conjunctivae and EOM are normal    Neck: Normal range of motion  Neck supple  Cardiovascular: Normal rate and regular rhythm  Pulmonary/Chest: Effort normal  No respiratory distress  Abdominal: Soft  There is tenderness  Genitourinary:   Genitourinary Comments: Haile catheter in place   Musculoskeletal: He exhibits no edema  Limited mobility due to generalized weakness   Neurological: He is alert  No cranial nerve deficit  Skin: Skin is warm and dry  There is pallor  Psychiatric: He has a normal mood and affect  Additional Data:   Lab Results: I have personally reviewed pertinent reports  Results from last 7 days  Lab Units 02/03/19  0910   WBC Thousand/uL 16 80*   HEMOGLOBIN g/dL 9 5*   HEMATOCRIT % 28 5*   PLATELETS Thousands/uL 168   NEUTROS PCT % 87*   LYMPHS PCT % 2*   MONOS PCT % 10   EOS PCT % 0       Results from last 7 days  Lab Units 02/03/19  0910   POTASSIUM mmol/L 4 3   CHLORIDE mmol/L 96*   CO2 mmol/L 25   BUN mg/dL 67*   CREATININE mg/dL 3 06*   CALCIUM mg/dL 9 2   ALK PHOS U/L 101   ALT U/L 17   AST U/L 17       Results from last 7 days  Lab Units 02/03/19  0910   INR  1 23       Results from last 7 days  Lab Units 02/03/19  1448 02/03/19  1415 02/03/19  1324 02/03/19  1200 02/03/19  0858 01/30/19  0604 01/29/19  2101 01/29/19  1628 01/29/19  1055 01/29/19  0638 01/28/19  2052 01/28/19  1711   POC GLUCOSE mg/dl 358* 387* 394* 385* 396* 225* 406* 339* 374* 188* 276* 412*       Results from last 7 days  Lab Units 01/27/19  2015   HEMOGLOBIN A1C % 7 4*       Imaging: I have personally reviewed pertinent reports  CT abdomen pelvis wo contrast   Final Result by Ria Bush MD (02/03 1121)         1    No significant change in appearance of calcified mesenteric mass, appearance suspicious for carcinoid tumor  2   No significant change in multiple hypoattenuating lesions within the liver, biopsy-proven metastatic neuroendocrine tumor  3   No significant change in bilateral mild to moderate hydroureteronephrosis  No calculi identified  4   Unchanged circumferential bladder wall thickening  Workstation performed: [id number]         XR chest portable    (Results Pending)       NetAccess/Epic Records Reviewed: Yes     ** Please Note: This note has been constructed using a voice recognition system   **

## 2019-02-03 NOTE — ED NOTES
poc glucose 394 mg/dL    Insulin drip started at 6 units /hr per verbal order from Kip Mixon, hospitalist      Sayra Wyman RN  02/03/19 4769

## 2019-02-03 NOTE — ASSESSMENT & PLAN NOTE
· Does not appear to be in DKA at this time as bicarb level is normal  · Will continue insulin sliding scale  · Fingersticks a c   And HS  · Diabetic diet  · Hold oral medications

## 2019-02-03 NOTE — ASSESSMENT & PLAN NOTE
· Patient with CT abdomen/pelvis showing bilateral mild hydroureteronephrosis which is unchanged from previous CT scan done a few weeks ago  · Will continue IV fluids  · Replaced Haile catheter due to infection  · Will consult Nephrology and Urology  · Monitor kidney function

## 2019-02-03 NOTE — NURSING NOTE
Patient admitted to ICU #1 from emergency room, diagnosis Acute Cystitis without hematuria  Patient is awake and alert and oriented x 4, complains of feeling week  Patient stated he has felt this way for three weeks and has been in and out of hospitals since  Received patient on insulin gtt @ 6 units/hour  Blood sugar 358  Patient asymptomatic  Dr Jw Dunn present  Insulin gtt discontinued and patient placed on sliding scale with coverage  Family present  Haile cath changed per order without difficulty  Urine hilda with sediment

## 2019-02-03 NOTE — ASSESSMENT & PLAN NOTE
· Patient with abdominal mesenteric mass with lesions in the liver  · Patient had biopsy of liver lesion on 01/24/19  · 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  · Patient was unaware of results and I did give the results to the patient today  · Spoke to Dr Kj Metcalf over the phone regarding biopsy results    He stated that he will see the patient in the office as an outpatient no need for any urgent intervention at this time  · Patient is not having any diarrhea at this time however if patient does develop diarrhea that is uncontrolled and non infectious could be related to patient's tumor and may benefit from octreotide

## 2019-02-03 NOTE — ED NOTES
Pt awaiting icu nurse to arrive before he is able to go up to unit      Insulin drip started at 6 units per hour per verbal order from 1604 Kendall Rudd RN  02/03/19 6552

## 2019-02-03 NOTE — ASSESSMENT & PLAN NOTE
· Patient with Haile catheter placed a few weeks ago due to obstruction  · Will replace Haile catheter  · Continue IV antibiotics  · Follow up cultures  · Follow-up procalcitonin level  · IV fluids as needed

## 2019-02-04 LAB
ANION GAP SERPL CALCULATED.3IONS-SCNC: 10 MMOL/L (ref 4–13)
ANION GAP SERPL CALCULATED.3IONS-SCNC: 11 MMOL/L (ref 4–13)
ANION GAP SERPL CALCULATED.3IONS-SCNC: 9 MMOL/L (ref 4–13)
BETA-HYDROXYBUTYRATE: <0.1 MMOL/L (ref 0.02–0.27)
BUN SERPL-MCNC: 49 MG/DL (ref 7–25)
BUN SERPL-MCNC: 59 MG/DL (ref 7–25)
BUN SERPL-MCNC: 61 MG/DL (ref 7–25)
CALCIUM SERPL-MCNC: 8.5 MG/DL (ref 8.6–10.5)
CALCIUM SERPL-MCNC: 8.9 MG/DL (ref 8.6–10.5)
CALCIUM SERPL-MCNC: 9.2 MG/DL (ref 8.6–10.5)
CHLORIDE SERPL-SCNC: 102 MMOL/L (ref 98–107)
CHLORIDE SERPL-SCNC: 103 MMOL/L (ref 98–107)
CHLORIDE SERPL-SCNC: 97 MMOL/L (ref 98–107)
CO2 SERPL-SCNC: 23 MMOL/L (ref 21–31)
CO2 SERPL-SCNC: 23 MMOL/L (ref 21–31)
CO2 SERPL-SCNC: 24 MMOL/L (ref 21–31)
CREAT SERPL-MCNC: 2.37 MG/DL (ref 0.7–1.3)
CREAT SERPL-MCNC: 2.77 MG/DL (ref 0.7–1.3)
CREAT SERPL-MCNC: 2.9 MG/DL (ref 0.7–1.3)
CREAT UR-MCNC: 32.1 MG/DL
ERYTHROCYTE [DISTWIDTH] IN BLOOD BY AUTOMATED COUNT: 13 % (ref 11.5–14.5)
FLUAV AG SPEC QL: NOT DETECTED
FLUBV AG SPEC QL: NOT DETECTED
GFR SERPL CREATININE-BSD FRML MDRD: 20 ML/MIN/1.73SQ M
GFR SERPL CREATININE-BSD FRML MDRD: 21 ML/MIN/1.73SQ M
GFR SERPL CREATININE-BSD FRML MDRD: 25 ML/MIN/1.73SQ M
GLUCOSE SERPL-MCNC: 128 MG/DL (ref 65–140)
GLUCOSE SERPL-MCNC: 128 MG/DL (ref 65–99)
GLUCOSE SERPL-MCNC: 138 MG/DL (ref 65–140)
GLUCOSE SERPL-MCNC: 141 MG/DL (ref 65–140)
GLUCOSE SERPL-MCNC: 149 MG/DL (ref 65–140)
GLUCOSE SERPL-MCNC: 183 MG/DL (ref 65–140)
GLUCOSE SERPL-MCNC: 231 MG/DL (ref 65–140)
GLUCOSE SERPL-MCNC: 239 MG/DL (ref 65–99)
GLUCOSE SERPL-MCNC: 247 MG/DL (ref 65–140)
GLUCOSE SERPL-MCNC: 384 MG/DL (ref 65–140)
GLUCOSE SERPL-MCNC: 436 MG/DL (ref 65–140)
GLUCOSE SERPL-MCNC: 463 MG/DL (ref 65–99)
GLUCOSE SERPL-MCNC: 469 MG/DL (ref 65–140)
GLUCOSE SERPL-MCNC: >500 MG/DL (ref 65–140)
HCT VFR BLD AUTO: 25.7 % (ref 36.5–49.3)
HGB BLD-MCNC: 8.7 G/DL (ref 14–18)
MAGNESIUM SERPL-MCNC: 1.4 MG/DL (ref 1.9–2.7)
MAGNESIUM SERPL-MCNC: 1.6 MG/DL (ref 1.9–2.7)
MCH RBC QN AUTO: 30.7 PG (ref 26–34)
MCHC RBC AUTO-ENTMCNC: 34.1 G/DL (ref 31–37)
MCV RBC AUTO: 90 FL (ref 81–99)
PHOSPHATE SERPL-MCNC: 3 MG/DL (ref 3–5.5)
PLATELET # BLD AUTO: 150 THOUSANDS/UL (ref 149–390)
PMV BLD AUTO: 9.5 FL (ref 8.6–11.7)
POTASSIUM SERPL-SCNC: 3.6 MMOL/L (ref 3.5–5.5)
POTASSIUM SERPL-SCNC: 4 MMOL/L (ref 3.5–5.5)
POTASSIUM SERPL-SCNC: 4.2 MMOL/L (ref 3.5–5.5)
PROCALCITONIN SERPL-MCNC: 2.33 NG/ML
RBC # BLD AUTO: 2.85 MILLION/UL (ref 4.3–5.9)
RSV B RNA SPEC QL NAA+PROBE: NOT DETECTED
SODIUM 24H UR-SCNC: 53 MOL/L
SODIUM SERPL-SCNC: 131 MMOL/L (ref 134–143)
SODIUM SERPL-SCNC: 135 MMOL/L (ref 134–143)
SODIUM SERPL-SCNC: 136 MMOL/L (ref 134–143)
WBC # BLD AUTO: 13.2 THOUSAND/UL (ref 4.8–10.8)

## 2019-02-04 PROCEDURE — 82010 KETONE BODYS QUAN: CPT | Performed by: INTERNAL MEDICINE

## 2019-02-04 PROCEDURE — G8988 SELF CARE GOAL STATUS: HCPCS

## 2019-02-04 PROCEDURE — 84145 PROCALCITONIN (PCT): CPT | Performed by: INTERNAL MEDICINE

## 2019-02-04 PROCEDURE — 80048 BASIC METABOLIC PNL TOTAL CA: CPT | Performed by: INTERNAL MEDICINE

## 2019-02-04 PROCEDURE — 97167 OT EVAL HIGH COMPLEX 60 MIN: CPT

## 2019-02-04 PROCEDURE — 97530 THERAPEUTIC ACTIVITIES: CPT

## 2019-02-04 PROCEDURE — 83735 ASSAY OF MAGNESIUM: CPT | Performed by: INTERNAL MEDICINE

## 2019-02-04 PROCEDURE — G8987 SELF CARE CURRENT STATUS: HCPCS

## 2019-02-04 PROCEDURE — 99232 SBSQ HOSP IP/OBS MODERATE 35: CPT | Performed by: INTERNAL MEDICINE

## 2019-02-04 PROCEDURE — 80048 BASIC METABOLIC PNL TOTAL CA: CPT | Performed by: PHYSICIAN ASSISTANT

## 2019-02-04 PROCEDURE — G8979 MOBILITY GOAL STATUS: HCPCS

## 2019-02-04 PROCEDURE — G8978 MOBILITY CURRENT STATUS: HCPCS

## 2019-02-04 PROCEDURE — 97163 PT EVAL HIGH COMPLEX 45 MIN: CPT

## 2019-02-04 PROCEDURE — 82948 REAGENT STRIP/BLOOD GLUCOSE: CPT

## 2019-02-04 PROCEDURE — 84100 ASSAY OF PHOSPHORUS: CPT | Performed by: INTERNAL MEDICINE

## 2019-02-04 PROCEDURE — 85027 COMPLETE CBC AUTOMATED: CPT | Performed by: PHYSICIAN ASSISTANT

## 2019-02-04 PROCEDURE — 84300 ASSAY OF URINE SODIUM: CPT | Performed by: INTERNAL MEDICINE

## 2019-02-04 PROCEDURE — 82570 ASSAY OF URINE CREATININE: CPT | Performed by: INTERNAL MEDICINE

## 2019-02-04 RX ORDER — INSULIN GLARGINE 100 [IU]/ML
10 INJECTION, SOLUTION SUBCUTANEOUS
Status: DISCONTINUED | OUTPATIENT
Start: 2019-02-04 | End: 2019-02-04

## 2019-02-04 RX ORDER — DEXTROSE AND SODIUM CHLORIDE 5; .45 G/100ML; G/100ML
250 INJECTION, SOLUTION INTRAVENOUS CONTINUOUS
Status: DISCONTINUED | OUTPATIENT
Start: 2019-02-04 | End: 2019-02-04

## 2019-02-04 RX ORDER — POTASSIUM CHLORIDE 20 MEQ/1
40 TABLET, EXTENDED RELEASE ORAL ONCE
Status: COMPLETED | OUTPATIENT
Start: 2019-02-04 | End: 2019-02-04

## 2019-02-04 RX ORDER — MAGNESIUM SULFATE HEPTAHYDRATE 40 MG/ML
4 INJECTION, SOLUTION INTRAVENOUS ONCE
Status: COMPLETED | OUTPATIENT
Start: 2019-02-04 | End: 2019-02-05

## 2019-02-04 RX ORDER — SODIUM CHLORIDE 9 MG/ML
125 INJECTION, SOLUTION INTRAVENOUS CONTINUOUS
Status: DISCONTINUED | OUTPATIENT
Start: 2019-02-04 | End: 2019-02-05

## 2019-02-04 RX ORDER — SODIUM CHLORIDE 9 MG/ML
250 INJECTION, SOLUTION INTRAVENOUS CONTINUOUS
Status: DISCONTINUED | OUTPATIENT
Start: 2019-02-04 | End: 2019-02-04

## 2019-02-04 RX ORDER — SODIUM CHLORIDE 9 MG/ML
500 INJECTION, SOLUTION INTRAVENOUS CONTINUOUS
Status: DISCONTINUED | OUTPATIENT
Start: 2019-02-04 | End: 2019-02-04

## 2019-02-04 RX ORDER — SODIUM CHLORIDE 9 MG/ML
1000 INJECTION, SOLUTION INTRAVENOUS CONTINUOUS
Status: DISCONTINUED | OUTPATIENT
Start: 2019-02-04 | End: 2019-02-04

## 2019-02-04 RX ADMIN — CEFTRIAXONE 1000 MG: 1 INJECTION, SOLUTION INTRAVENOUS at 11:09

## 2019-02-04 RX ADMIN — SODIUM CHLORIDE 1000 ML/HR: 9 INJECTION, SOLUTION INTRAVENOUS at 12:41

## 2019-02-04 RX ADMIN — SODIUM CHLORIDE 6 UNITS/HR: 9 INJECTION, SOLUTION INTRAVENOUS at 12:53

## 2019-02-04 RX ADMIN — SODIUM CHLORIDE 500 ML/HR: 9 INJECTION, SOLUTION INTRAVENOUS at 14:01

## 2019-02-04 RX ADMIN — SODIUM CHLORIDE 125 ML/HR: 9 INJECTION, SOLUTION INTRAVENOUS at 22:19

## 2019-02-04 RX ADMIN — ENOXAPARIN SODIUM 30 MG: 40 INJECTION SUBCUTANEOUS at 09:52

## 2019-02-04 RX ADMIN — MAGNESIUM SULFATE HEPTAHYDRATE 4 G: 40 INJECTION, SOLUTION INTRAVENOUS at 21:04

## 2019-02-04 RX ADMIN — INSULIN LISPRO 5 UNITS: 100 INJECTION, SOLUTION INTRAVENOUS; SUBCUTANEOUS at 11:10

## 2019-02-04 RX ADMIN — INSULIN DETEMIR 15 UNITS: 100 INJECTION, SOLUTION SUBCUTANEOUS at 21:13

## 2019-02-04 RX ADMIN — SODIUM CHLORIDE 250 ML/HR: 9 INJECTION, SOLUTION INTRAVENOUS at 15:01

## 2019-02-04 RX ADMIN — INSULIN LISPRO 2 UNITS: 100 INJECTION, SOLUTION INTRAVENOUS; SUBCUTANEOUS at 07:51

## 2019-02-04 RX ADMIN — AMLODIPINE BESYLATE 5 MG: 5 TABLET ORAL at 09:53

## 2019-02-04 RX ADMIN — INSULIN HUMAN 6 UNITS: 100 INJECTION, SOLUTION PARENTERAL at 13:14

## 2019-02-04 RX ADMIN — DEXTROSE AND SODIUM CHLORIDE 250 ML/HR: 5; 450 INJECTION, SOLUTION INTRAVENOUS at 17:13

## 2019-02-04 RX ADMIN — POTASSIUM CHLORIDE 40 MEQ: 1500 TABLET, EXTENDED RELEASE ORAL at 20:44

## 2019-02-04 NOTE — CONSULTS
Consultation - Nephrology   Romana Bound 77 y o  male MRN: [id number]  Unit/Bed#: ICU 01 Encounter: [id number]      A/P:  1  Acute renal failure sure what on top of chronic kidney disease   Continue IV fluids at this time, the patient suffered an acute tubular necrosis versus a pre renal state at most recent hospitalization at [place] [address]  Check urine studies at this time, although given the timeline of when we are checking the urine studies will most likely not be helpful unless that showed fraction excretion of sodium less than 1%  2  Chronic kidney disease stage 3a with baseline creatinine likely around 0 9 - 1 mg/dL  3  Probable diabetic nephropathy  4  Proteinuria currently sub nephrotic   This most likely due to diabetic nephropathy, however, patient also has the potential issue of a neuroendocrine tumor which really affects the kidneys  Continue to manage the patient's past as possible including the introduction of an angiotensin receptor blocker or lisinopril if the patient can tolerate  Will defer further workup to the outpatient setting, including getting a serum and urine electrophoresis with immunofixation to rule out any underlying monoclonal gammopathy  5  Neuroendocrine tumor   This newly diagnosed, biopsy was from January 24th the patient was just made aware of the positive findings upon admission here at the [address] [place] [address]  6  Urinary retention   Patient's Haile catheter intact, we need to ensure is properly working  Urology was asked to evaluate the patient and will defer further care  7  Acute cystitis    Patient Ceftriaxone, follow-up consult is  Thank you for allowing us to participate in the care of your patient  Please feel free to contact us regarding the care of this patient, or any other questions/concerns that may be applicable      Patient Active Problem List   Diagnosis    Diarrhea    Weakness generalized    Diabetes mellitus ([facility] [address] )    Hypertension    Hyperlipidemia    Cardiac disease    Acute renal failure superimposed on stage 3 chronic kidney disease (HCC)    Generalized abdominal mass    Hydroureter    Anemia    Vasovagal syncope    Accelerated hypertension    Liver mass    Neuroendocrine tumor    Acute cystitis without hematuria       History of Present Illness   Physician Requesting Consult: Sekou Duffy MD  Reason for Consult / Principal Problem:  Acute kidney injury  Hx and PE limited by:   HPI: Vimal Hernandez is a 77y o  year old male who presented to the emergency department on February 3rd with complaints of generalized weakness  Patient claims that he has been weak since his discharge from the [facility] or use found to have significant renal failure associated with a vasovagal episode  Nephrology is not asked to see the patient during that admission  Patient's creatinine had improved with volume expansion, no urine sodium or creatinine done at that time  At this time, patient is feeling better status post volume expansion  Patient presented with a Haile catheter be due to urinary retention  Patient also had a recent biopsy of a liver lesion which turned out to be a neuroendocrine tumor  Patient presents to the hospital with a creatinine of 3 06 mg/dL  This was improved down from 3 16 mg/dL which was discharge date on January 28th from the [facility]  Patient's creatinine continues to improve very slowly  There is no significant electrolyte abnormalities  Patient had been complaining of decreased p o  Intake and this is associated with abdominal cramping  History obtained from chart review and the patient    Review of Systems - Negative except as mentioned above in HPI, more specifics as mentioned below    Review of Systems - General ROS: positive for  - fatigue  Psychological ROS: negative  Ophthalmic ROS: negative  ENT ROS: negative  Allergy and Immunology ROS: negative  Hematological and Lymphatic ROS: negative  Endocrine ROS: negative  Respiratory ROS: no cough, shortness of breath, or wheezing  Cardiovascular ROS: no chest pain or dyspnea on exertion  Gastrointestinal ROS:  As mentioned above, no diarrhea  Genito-Urinary ROS:  Haile catheter for urinary retention  Musculoskeletal ROS: positive for - muscular weakness  Neurological ROS: no TIA or stroke symptoms  Dermatological ROS: negative    Historical Information   Past Medical History:   Diagnosis Date    Cardiac disease     Coronary artery disease     Diabetes mellitus ([address] )     Hyperlipidemia     Hypertension      Past Surgical History:   Procedure Laterality Date    IR IMAGE GUIDED BIOPSY/ASPIRATION LIVER  1/24/2019     Social History   History   Alcohol Use No     History   Drug Use No     History   Smoking Status    Never Smoker   Smokeless Tobacco    Never Used     Family History   Problem Relation Age of Onset    Cancer Mother     Hypertension Father     Cancer Brother     Cancer Maternal Grandmother     Cancer Paternal Aunt        Meds/Allergies   all current active meds have been reviewed, current meds: Current Facility-Administered Medications   Medication Dose Route Frequency    acetaminophen (TYLENOL) tablet 650 mg  650 mg Oral Q4H PRN    amLODIPine (NORVASC) tablet 5 mg  5 mg Oral Daily    cefTRIAXone (ROCEPHIN) IVPB (premix) 1,000 mg  1,000 mg Intravenous Q24H    enoxaparin (LOVENOX) subcutaneous injection 30 mg  30 mg Subcutaneous Daily    hydrALAZINE (APRESOLINE) injection 5 mg  5 mg Intravenous Q6H PRN    insulin glargine (LANTUS) subcutaneous injection 10 Units 0 1 mL  10 Units Subcutaneous HS    insulin lispro (HumaLOG) 100 units/mL subcutaneous injection 1-5 Units  1-5 Units Subcutaneous TID AC    insulin lispro (HumaLOG) 100 units/mL subcutaneous injection 1-5 Units  1-5 Units Subcutaneous HS    ondansetron (ZOFRAN) injection 4 mg  4 mg Intravenous Q6H PRN    and PTA meds:  Prescriptions Prior to Admission   Medication    amLODIPine (NORVASC) 5 mg tablet    aspirin 81 MG tablet    glipiZIDE (GLUCOTROL) 5 mg tablet         No Known Allergies    Objective     Intake/Output Summary (Last 24 hours) at 02/04/19 0927  Last data filed at 02/04/19 0901   Gross per 24 hour   Intake           2420 8 ml   Output             1680 ml   Net            740 8 ml       Invasive Devices:   Urethral Catheter Straight-tip 16 Fr  (Active)   Amt returned on insertion(mL) 5 mL 2/3/2019  3:21 PM   Site Assessment Patent 2/3/2019  3:21 PM   Collection Container Standard drainage bag 2/3/2019  3:21 PM   Securement Method Securing device (Describe) 2/3/2019  3:21 PM   Output (mL) 125 mL 2/4/2019  6:21 AM       Physical Exam      I/O last 3 completed shifts: In: 2060 8 [P O :400; I V :410 8; IV Piggyback:1250]  Out: 1680 [Urine:1680]    Vitals:    02/04/19 0728   BP:    Pulse:    Resp:    Temp: 99 °F (37 2 °C)   SpO2:        Gen: in NAD, Alert/Awake  HEENT: no sclerous icterus, MMM, neck supple  CV: +S1/S2, RRR  Lungs: CTA bilaterally  Abd: +BS, soft NT/ND  Ext: all four extremities are warm  Skin: no rashes noted  Neuro: CN II-XII intact    Current Weight: Weight - Scale: 68 5 kg (151 lb)  First Weight: Weight - Scale: 68 5 kg (151 lb)    Lab Results:  I have personally reviewed pertinent labs      CBC: Lab Results   Component Value Date    WBC 13 20 (H) 02/04/2019    HGB 8 7 (L) 02/04/2019    HCT 25 7 (L) 02/04/2019    MCV 90 02/04/2019     02/04/2019    MCH 30 7 02/04/2019    MCHC 34 1 02/04/2019    RDW 13 0 02/04/2019    MPV 9 5 02/04/2019     CMP: Lab Results   Component Value Date    K 4 2 02/04/2019     02/04/2019    CO2 24 02/04/2019    BUN 59 (H) 02/04/2019    CREATININE 2 90 (H) 02/04/2019    CALCIUM 9 2 02/04/2019    EGFR 20 02/04/2019     Phosphorus: No results found for: PHOS  Magnesium: No results found for: MG  Urinalysis: Lab Results   Component Value Date    COLORU Yellow 02/03/2019    CLARITYU Slightly Cloudy (A) 02/03/2019    SPECGRAV 1 015 02/03/2019    PHUR 5 5 02/03/2019    LEUKOCYTESUR 2+ (A) 02/03/2019    NITRITE Positive (A) 02/03/2019    GLUCOSEU 3+ (A) 02/03/2019    KETONESU Negative 02/03/2019    BILIRUBINUR Negative 02/03/2019    BLOODU 3+ (A) 02/03/2019     Ionized Calcium: No results found for: CAION  Coagulation: No results found for: PT, INR, APTT  Troponin: No results found for: TROPONINI  ABG: No results found for: PHART, YZI0MEA, PO2ART, YDU8XZY, J4YQCGTE, BEART, SOURCE      Results from last 7 days  Lab Units 02/04/19  0431 02/03/19  0910   POTASSIUM mmol/L 4 2 4 3   CHLORIDE mmol/L 102 96*   CO2 mmol/L 24 25   BUN mg/dL 59* 67*   CREATININE mg/dL 2 90* 3 06*   CALCIUM mg/dL 9 2 9 2   ALK PHOS U/L  --  101   ALT U/L  --  17   AST U/L  --  17       Radiology review:  Procedure: Ct Abdomen Pelvis Wo Contrast    Result Date: 2/3/2019  Narrative: CT ABDOMEN AND PELVIS WITHOUT IV CONTRAST INDICATION:   Hydronephrosis abd pain, known abd mass  Newly diagnosed metastatic neuroendocrine tumor  COMPARISON:  CT abdomen and pelvis 1/23/2018 TECHNIQUE:  CT examination of the abdomen and pelvis was performed without intravenous contrast   Axial, sagittal, and coronal 2D reformatted images were created from the source data and submitted for interpretation  Radiation dose length product (DLP) for this visit:  350 5 mGy-cm   This examination, like all CT scans performed in the [place], was performed utilizing techniques to minimize radiation dose exposure, including the use of iterative reconstruction and automated exposure control  Enteric contrast was administered  FINDINGS: ABDOMEN LOWER CHEST:  No clinically significant abnormality identified in the visualized lower chest  LIVER/BILIARY TREE:  Multiple hypoattenuating lesions within the liver are again seen in keeping with known metastatic disease  GALLBLADDER:  No calcified gallstones  No pericholecystic inflammatory change   SPLEEN: Unremarkable  PANCREAS:  Unremarkable  ADRENAL GLANDS:  Unremarkable  KIDNEYS/URETERS:  Mild to moderate bilateral hydroureteronephrosis is not significantly changed  No calculi are identified  STOMACH AND BOWEL:  Unremarkable  APPENDIX:  No findings to suggest appendicitis  ABDOMINOPELVIC CAVITY:  Calcified soft tissue mass within the root of the mesentery in the right lower quadrant is again seen and most suspicious for carcinoid tumor  This measures approximately 2 7 x 3 5 cm  VESSELS:  Unremarkable for patient's age  PELVIS REPRODUCTIVE ORGANS:  Unremarkable for patient's age  URINARY BLADDER:  Bladder is partially decompressed by Haile catheter  Circumferential bladder wall thickening is again noted and out of proportion to underdistention  ABDOMINAL WALL/INGUINAL REGIONS:  Unremarkable  OSSEOUS STRUCTURES:  No acute fracture or destructive osseous lesion  Impression: 1  No significant change in appearance of calcified mesenteric mass, appearance suspicious for carcinoid tumor  2   No significant change in multiple hypoattenuating lesions within the liver, biopsy-proven metastatic neuroendocrine tumor  3   No significant change in bilateral mild to moderate hydroureteronephrosis  No calculi identified  4   Unchanged circumferential bladder wall thickening  Workstation performed: [id number]     Procedure: Xr Chest Portable    Result Date: 2/4/2019  Narrative: CHEST INDICATION:   sepsis  History of metastatic neuroendocrine tumor COMPARISON:  1/27/2019 EXAM PERFORMED/VIEWS:  XR CHEST PORTABLE FINDINGS: Cardiomediastinal silhouette appears unremarkable  The lungs are clear  No pneumothorax or pleural effusion  Osseous structures appear within normal limits for patient age  Impression: No acute cardiopulmonary disease   Workstation performed: [id number]             Kevin Rojas DO

## 2019-02-04 NOTE — NURSING NOTE
Patient was offered to be repositioned every two hours  At times he refused, but was repositioned when agreeable to it

## 2019-02-04 NOTE — ASSESSMENT & PLAN NOTE
· Patient with CT abdomen/pelvis showing bilateral mild hydroureteronephrosis which is unchanged from previous CT scan done a few weeks ago  · Modest improvement of creatinine  · Will continue IV fluids  · Nephrology Neurology consult  · Trend renal function

## 2019-02-04 NOTE — PLAN OF CARE
Problem: PHYSICAL THERAPY ADULT  Goal: Performs mobility at highest level of function for planned discharge setting  See evaluation for individualized goals  Treatment/Interventions: Functional transfer training, LE strengthening/ROM, Therapeutic exercise, Endurance training, Equipment eval/education, Bed mobility, Gait training, Compensatory technique education, OT (and neuro re-education w/ balance training)  Equipment Recommended: Alycia Martino (patient has own)     See flowsheet documentation for full assessment, interventions and recommendations  Zuhair Lechuga, PT      Prognosis: Fair  Problem List: Decreased strength, Decreased endurance, Impaired balance, Decreased mobility, Decreased coordination, Impaired judgement, Decreased safety awareness  Assessment: Pt is 77 y o  male seen for PT evaluation s/p admit [facility] w/ Acute cystitis without hematuria  PT consulted to assess pt's functional mobility and d/c needs  Order placed for PT eval and tx, w/ activity as tolerated order  Comorbidities affecting pt's physical performance at time of assessment include: weakness, DM, HTN, anemia, ARF on stage 3 CKD, liver mass, neuroendocrine tumor, acute cystitis without hematuria  PTA, pt was independent w/ all functional mobility w/ RW and lives w/ significant other in one level house  Personal factors affecting pt at time of IE include: ambulating w/ assistive device, stairs to enter home, inability to ambulate household distances, inability to navigate community distances, inability to navigate level surfaces w/o external assistance, unable to perform dynamic tasks in community, positive fall history, unable to perform physical activity and inability to perform ADLs   Please find objective findings from PT assessment regarding body systems outlined above with impairments and limitations including weakness, impaired balance, decreased endurance, impaired coordination, gait deviations, decreased activity tolerance, decreased safety awareness, impaired judgement and fall risk  The following objective measures performed on IE also reveal limitations: Barthel Index: 25/100  Pt's clinical presentation is currently unstable/unpredictable  Pt to benefit from continued PT tx to address deficits as defined above and maximize level of functional independent mobility and consistency  From PT/mobility standpoint, recommendation at time of d/c would be STR pending progress in order to facilitate return to PLOF  Recommendation: Short-term skilled PT     PT - OK to Discharge: Yes (if medically stable to STR)    See flowsheet documentation for full assessment     Shayy Gutierrez, PT

## 2019-02-04 NOTE — PLAN OF CARE
Problem: DISCHARGE PLANNING - CARE MANAGEMENT  Goal: Discharge to post-acute care or home with appropriate resources  INTERVENTIONS:  - Conduct assessment to determine patient/family and health care team treatment goals, and need for post-acute services based on payer coverage, community resources, and patient preferences, and barriers to discharge  - Address psychosocial, clinical, and financial barriers to discharge as identified in assessment in conjunction with the patient/family and health care team  - Arrange appropriate level of post-acute services according to patient's   needs and preference and payer coverage in collaboration with the physician and health care team  - Communicate with and update the patient/family, physician, and health care team regarding progress on the discharge plan  - Arrange appropriate transportation to post-acute venues  - Patient's agreeable to STR at SNF   Outcome: Progressing

## 2019-02-04 NOTE — NURSING NOTE
Insulin drip started as per [facility] order  Labs drawn as per protocol  Regular insulin drip infusing at 6units/hr  No immediate noted distress  Resting with family at bedside  Rails up times 2; callbell in reach

## 2019-02-04 NOTE — PHYSICAL THERAPY NOTE
Physical Therapy Evaluation     Patient's Name: Orvilla Dance    Admitting Diagnosis  Dizziness [R42]  Malaise [R53 81]  Weakness [R53 1]  Nausea and vomiting [R11 2]  Hyperglycemia [R73 9]  Liver mass [R16 0]  SIRS (systemic inflammatory response syndrome) (HCC) [R65 10]  Leukocytosis, unspecified type [D72 829]    Problem List  Patient Active Problem List   Diagnosis    Diarrhea    Weakness generalized    Diabetes mellitus ([facility] [address] )    Hypertension    Hyperlipidemia    Cardiac disease    Acute renal failure superimposed on stage 3 chronic kidney disease (HCC)    Generalized abdominal mass    Hydroureter    Anemia    Vasovagal syncope    Accelerated hypertension    Liver mass    Neuroendocrine tumor    Acute cystitis without hematuria       Past Medical History  Past Medical History:   Diagnosis Date    Cardiac disease     Coronary artery disease     Diabetes mellitus ([facility] [address] )     Hyperlipidemia     Hypertension        Past Surgical History  Past Surgical History:   Procedure Laterality Date    IR IMAGE GUIDED BIOPSY/ASPIRATION LIVER  1/24/2019 02/04/19 4944   Note Type   Note type Eval/Treat   Pain Assessment   Pain Assessment No/denies pain   Pain Score No Pain   Home Living   Type of [address] [place] [address] One level;Stairs to enter with rails; Performs ADLs on one level; Able to live on main level with bedroom/bathroom  (3 BETH)   Bathroom Shower/Tub Tub/shower unit   Bathroom Toilet Standard   Bathroom Equipment Shower chair;Commode   [facility]; Wheelchair-manual  (rolling walker)   Prior Function   Level of [place] Independent with ADLs and functional mobility; Needs assistance with IADLs   Lives With Significant other  (girlfriend)   Receives Help From Family   ADL Assistance Independent   IADLs Needs assistance   Falls in the last 6 months 1 to 4  (x2)   Vocational Retired   Restrictions/Precautions   [facility] Bearing Precautions Per Order No   Other Precautions O2;Fall Risk;Telemetry;Multiple lines;Hard of hearing;Droplet precautions  (2 L O2)   General   Family/Caregiver Present No   Cognition   Overall Cognitive Status WFL   Arousal/Participation Responsive   Orientation Level Oriented X4   Memory Within functional limits   Following Commands Follows one step commands with increased time or repetition   RUE Assessment   RUE Assessment (see OT assessment)   LUE Assessment   LUE Assessment (see OT assessment)   RLE Assessment   RLE Assessment X   Strength RLE   R Hip Flexion 3-/5   R Knee Extension 3-/5   R Ankle Dorsiflexion 3-/5   LLE Assessment   LLE Assessment X   Strength LLE   L Hip Flexion 3-/5   L Knee Extension 3-/5   L Ankle Dorsiflexion 3-/5   Coordination   Movements are Fluid and Coordinated 0   Coordination and Movement Description incremental mobility w/ decreased margarita 2/2 weakness   Bed Mobility   Supine to Sit 3  Moderate assistance   Additional items Assist x 2;Bedrails;Verbal cues;LE management; Increased time required;HOB elevated   Sit to Supine 3  Moderate assistance   Additional items Assist x 2;Bedrails;Verbal cues;LE management; Increased time required   Transfers   Sit to Stand 4  Minimal assistance   Additional items Assist x 2;Bedrails; Increased time required;Verbal cues   Stand to Sit 4  Minimal assistance   Additional items Assist x 2;Verbal cues; Increased time required; Bedrails   Ambulation/Elevation   Gait pattern Improper Weight shift;Narrow AMANDO; Forward Flexion;Decreased foot clearance; Short stride; Step to; Inconsistent margarita   Gait Assistance 4  Minimal assist   Additional items Assist x 2;Verbal cues; Tactile cues  (100% of session for proper GT pattern, safety awareness)   Assistive Device Rolling walker   Distance 3 feet fwd/bwd, 3 sidesteps toward [address] Not tested   Balance   Static Sitting Fair +   Dynamic Sitting Fair   Static Standing [address] -   Ambulatory Poor +   Endurance Deficit   Endurance Deficit Yes   Activity Tolerance   Activity Tolerance Patient limited by fatigue;Treatment limited secondary to medical complications (Comment)  (severe weakness, complicated medical admission)   Nurse Made Aware yes   Assessment   Prognosis Fair   Problem List Decreased strength;Decreased endurance; Impaired balance;Decreased mobility; Decreased coordination; Impaired judgement;Decreased safety awareness   Assessment Pt is 77 y o  male seen for PT evaluation s/p admit to [facility] on 2/3/2019 w/ Acute cystitis without hematuria  PT consulted to assess pt's functional mobility and d/c needs  Order placed for PT eval and tx, w/ activity as tolerated order  Comorbidities affecting pt's physical performance at time of assessment include: weakness, DM, HTN, anemia, ARF on stage 3 CKD, liver mass, neuroendocrine tumor, acute cystitis without hematuria  PTA, pt was independent w/ all functional mobility w/ RW and lives w/ significant other in one level house  Personal factors affecting pt at time of IE include: ambulating w/ assistive device, stairs to enter home, inability to ambulate household distances, inability to navigate community distances, inability to navigate level surfaces w/o external assistance, unable to perform dynamic tasks in community, positive fall history, unable to perform physical activity and inability to perform ADLs  Please find objective findings from PT assessment regarding body systems outlined above with impairments and limitations including weakness, impaired balance, decreased endurance, impaired coordination, gait deviations, decreased activity tolerance, decreased safety awareness, impaired judgement and fall risk  The following objective measures performed on IE also reveal limitations: Barthel Index: 25/100  Pt's clinical presentation is currently unstable/unpredictable   Pt to benefit from continued PT tx to address deficits as defined above and maximize level of functional independent mobility and consistency  From PT/mobility standpoint, recommendation at time of d/c would be STR pending progress in order to facilitate return to PLOF  Goals   Patient Goals feel stronger   LTG Expiration Date 02/11/19   Long Term Goal #1 Pt will perform bed mobility tasks to min A of 2 to decrease burden of care  Perform transfers to min A of 1 to decrease risk of skin breakdown with change of position  Perform ambulation with RW for 100 feet and min A of 2 to improve activity tolerance  Patient will tolerate AAROM<-> AROM BLE's to decrease risk of contractures and facilitate joint proprioception  Patient will demonstrate increased static standing balance to Fair+ and tactile cues <75% of treatment session to facilitate activation of stabilizing muscles and prepare for safe transfers   Treatment Day 1   Plan   Treatment/Interventions Functional transfer training;LE strengthening/ROM; Therapeutic exercise; Endurance training;Equipment eval/education; Bed mobility;Gait training; Compensatory technique education;OT  (and neuro re-education w/ balance training)   PT Frequency 5x/wk   Recommendation   Recommendation Short-term skilled PT   Equipment Recommended Walker  (patient has own)   PT - OK to Discharge Yes  (if medically stable to STR)   Additional Comments Upon conclusion, patient was positioned comfortably in bed w/ SCD's active and all needs met  Patient declined sitting OOB in chair 2/2 fatigue  Barthel Index   Feeding 5   Bathing 0   Grooming Score 0   Dressing Score 5   Bladder Score 0   Bowels Score 5   Toilet Use Score 5   Transfers (Bed/Chair) Score 5   Mobility (Level Surface) Score 0   Stairs Score 0   Barthel Index Score 25     Additional skilled interventions: Therapeutic Activity x 15 minutes  S: No reported pain prior or during session, patient agreeable to mobilize OOB as tolerated       O: therapeutic activity x 15 minutes including bed mobility, sit<-> stand TA's, and forward/backward/and sidesteps w/ RW and min A of 2  Verbal and tactile cueing were consistently provided during session 2/2 exhibited generalized weakness  Patient was returned BTB 2/2 fatigue,declined sitting OOB in chair  A: Patient exhibited fatigue, decreased endurance, impaired balance, and gait dysfunction  P: Continue PT tx with progression of functional tasks as patient tolerates and can safely complete tasks, 5x/week        Yasmin Ott, PT

## 2019-02-04 NOTE — NURSING NOTE
Patient was noted to have a 6 beat run of v-tach @ 0519 this am  Patient asymptomatic  Awoken at time of event and patient offered no complaints

## 2019-02-04 NOTE — QUICK NOTE
Patient was noted to be severely hyperglycemic, with Accu-Cheks greater than 500 after his breakfast this morning in spite of subcutaneous insulin  Patient does note some dietary indiscretion which may have led to his hyperglycemia    In light of the patient's difficult to control glucose, will place patient on insulin drip for now and hopefully transition back to subcutaneous insulin once better glucose control is obtained

## 2019-02-04 NOTE — UTILIZATION REVIEW
Network Utilization Review Department  Phone: [phone]; Fax [phone]  [email or web address]  ATTENTION: Please call with any questions or concerns to [phone]  and carefully listen to the prompts so that you are directed to the right person  Send all requests for admission clinical reviews, approved or denied determinations and any other requests to fax [phone]  All voicemails are confidential     Initial Clinical Review    Admission: Date/Time/Statement: 2/3/19 @ [address] [place] [address] This Encounter   Procedures    Inpatient Admission (expected length of stay for this patient Order details is greater than two midnights)     Standing Status:   Standing     Number of Occurrences:   1     Order Specific Question:   Admitting Physician     Answer:   Keshav Monsalve [[id number]]     Order Specific Question:   Level of Care     Answer:   Med Surg [[id number]]     Order Specific Question:   Estimated length of stay     Answer:   More than 2 Midnights     Order Specific Question:   Certification     Answer:   I certify that inpatient services are medically necessary for this patient for a duration of greater than two midnights  See H&P and MD Progress Notes for additional information about the patient's course of treatment  ED: Date/Time/Mode of Arrival:   ED Arrival Information     Expected Arrival Acuity Means of Arrival Escorted By Service Admission Type    - 2/3/2019 08:54 Emergent Ambulance [facility] Emergency    Arrival Complaint    general illness        Chief Complaint   Patient presents with   Ettie Raid - 7 years or greater     pt reports he has been ill for several days, had syncopal episode today, vomited a couple of times  reports extreme weakness  History of Illness: 78 y/o male presents to ED from home by EMS with worsening weakness and fatigue for 3 days    Patient states that over the last few days he has been feeling significant weakness and unable to get around as usual   Pt c/o intermittent nausea and intermittent abdominal pain  Pt reports inability to tolerate po and decreased urine output from his indwelling garcia  Pt had a recent hospitalization for renal failure due to abdominal mass and a recent hospitalization for diarrhea and syncope  On exam, pt appears sickly and listless, and has tachypnea  In the ER patient was suspected to be in DKA and started on insulin drip      ED Triage Vitals   Temperature Pulse Respirations Blood Pressure SpO2   02/03/19 0905 02/03/19 0900 02/03/19 0900 02/03/19 0900 02/03/19 0900   (!) 97 4 °F (36 3 °C) 91 (!) 23 106/63 97 %      Temp Source Heart Rate Source Patient Position - Orthostatic VS BP Location FiO2 (%)   02/03/19 0905 02/03/19 1400 02/03/19 0905 02/03/19 0905 --   Temporal Monitor Lying Left arm       Pain Score       02/03/19 0905       No Pain        Wt Readings from Last 1 Encounters:   02/03/19 68 5 kg (151 lb)     Vital Signs (abnormal):   02/04/19 1000 -- 95  23 141/64  86 % --   02/04/19 0953 -- -- -- 135/62 -- --   02/04/19 0900 -- 101  24 124/90  73 % --   02/04/19 0800 97 9 °F (36 6 °C) 84 22 141/64 96 % None (Room air)   02/04/19 0700 99 0 78 22 119/59 94 % --   02/04/19 0500 -- 80 20 98/55 93 % None (Room air)   02/04/19 0400 99 1 90 20 126/76 94 % None (Room air)   02/04/19 0200 -- 86  23 153/67 94 % None (Room air)   02/04/19 0100 -- 85  24 119/60 93 % None (Room air)   02/03/19 2200 -- 92  23 137/76 94 % None (Room air)   02/03/19 2100 -- 95  23 146/66 93 % None (Room air)   02/03/19 2000 -- 96  29 168/72 95 % None (Room air)   02/03/19 1900 99 4 89  25 126/63 98 % Nasal cannula 2L   02/03/19 1800 -- 93  23 115/59 99 % Nasal cannula 2L   02/03/19 1700 -- 99  24 143/67 99 % Nasal cannula 2L   02/03/19 1545 -- 85  25 110/56 97 % Nasal cannula 2L   02/03/19 1400 98 6 °F (37 °C) 79  26 107/56 100 % Nasal cannula 2L       Pertinent Labs/Diagnostic Test Results:   GFR 18, 20  Blood gluc 396, 409, 385, 394, 358, >500, 469  Na 132, 136  Cl 96, 102  BUN/Cr 67/3 06, 59/2 90  Alb 3 4  Mg 1 6  Lactic acid 1 8  Procalc 0 90  WBC 16 80, 13 20  H/H 9 5/28 5, 8 7/25 7  Flu/RSV negative  UA slightly cloudy urine, 3+ glucose, 3+ blood, positive nitrite, 2+ leuks, 1+ protein, 10-20 RBC, Mod bacteria    EKG:  Normal sinus rhythm  Left axis deviation  Left ventricular hypertrophy with repolarization abnormality    CXR:  No acute cardiopulmonary disease  CT abd/pelvis:  1   No significant change in appearance of calcified mesenteric mass, appearance suspicious for carcinoid tumor  2   No significant change in multiple hypoattenuating lesions within the liver, biopsy-proven metastatic neuroendocrine tumor  3   No significant change in bilateral mild to moderate hydroureteronephrosis   No calculi identified  4   Unchanged circumferential bladder wall thickening  ED Treatment:   Medication Administration from 02/03/2019 0853 to 02/03/2019 1429       Date/Time Order Dose Route Action     02/03/2019 1142 cefTRIAXone (ROCEPHIN) IVPB (premix) 1,000 mg 1,000 mg Intravenous New Bag     02/03/2019 1002 vancomycin (VANCOCIN) IVPB (premix) 1,000 mg 1,000 mg Intravenous New Bag     02/03/2019 0959 sodium chloride 0 9 % bolus 1,000 mL 1,000 mL Intravenous New Bag     02/03/2019 1312 insulin regular (HumuLIN R,NovoLIN R) 1 Units/mL in sodium chloride 0 9 % 100 mL infusion 6 Units/hr Intravenous New Bag        Past Medical/Surgical History:    Active Ambulatory Problems     Diagnosis Date Noted    Diarrhea 01/23/2019    Weakness generalized 01/23/2019    Diabetes mellitus ([facility] [address] ) 01/23/2019    Hypertension 01/23/2019    Hyperlipidemia 01/23/2019    Cardiac disease 01/23/2019    Acute renal failure superimposed on stage 3 chronic kidney disease ([address] ) 01/23/2019    Generalized abdominal mass 01/23/2019    Hydroureter 01/23/2019    Anemia 01/24/2019    Vasovagal syncope 01/27/2019    Accelerated hypertension 01/27/2019    Liver mass 01/27/2019     Past Medical History:   Diagnosis Date    Cardiac disease     Coronary artery disease     Diabetes mellitus ([facility] [address] )     Hyperlipidemia     Hypertension      Admitting Diagnosis: Dizziness [R42]  Malaise [R53 81]  Weakness [R53 1]  Nausea and vomiting [R11 2]  Hyperglycemia [R73 9]  Liver mass [R16 0]  SIRS (systemic inflammatory response syndrome) (HCC) [R65 10]  Leukocytosis, unspecified type [D72 829]  Age/Sex: 77 y o  male    Assessment/Plan: 76 y/o male to ED from home by EMS  Admitted as inpatient to ICU level of care due to acute cystitis, acute renal failure, anemia  Replace garcia catheter  Continue IV antibiotics  Urine cx pending  Continue IVF  Nephrology and urology consults  Recheck labs  Transfuse as needed  Continue insulin gtt due to hyperglycemia with blood glucose >500  Admission Orders:  Accuchecks  SCDs  VS  Labs  Urine Na, Cr  Urine cx  Diabetic diet  Oncology consult  Nephrology consult  Urology consult  PT/OT    Scheduled Meds:   Current Facility-Administered Medications:  acetaminophen 650 mg Oral Q4H PRN   amLODIPine 5 mg Oral Daily   dextrose 5 % and sodium chloride 0 45 % 250 mL/hr Intravenous Continuous   enoxaparin 30 mg Subcutaneous Daily   hydrALAZINE 5 mg Intravenous Q6H PRN   insulin regular (HumuLIN R,NovoLIN R) infusion 0 1-30 Units/hr Intravenous Continuous   ondansetron 4 mg Intravenous Q6H PRN   sodium chloride 500 mL/hr Intravenous Continuous   Followed by      sodium chloride 250 mL/hr Intravenous Continuous   vancomycin (VANCOCIN) IVPB (premix) 1,000 mg  Dose: 1,000 mg  Freq: Once Route: IV  cefTRIAXone (ROCEPHIN) IVPB (premix) 1,000 mg  Dose: 1,000 mg  Freq:  Once Route: IV

## 2019-02-04 NOTE — ASSESSMENT & PLAN NOTE
· Patient with Haile catheter placed a few weeks ago due to obstruction  · Haile replaced  · Continue IV antibiotics  · Follow up cultures

## 2019-02-04 NOTE — NURSING NOTE
@1945, patient's O2 sat on 2 liters n/c was 97%  Patient is not oxygen dependent at home and is denying any SO(B or RIOS  Patient removed from oxygen at this time; will monitor

## 2019-02-04 NOTE — PROGRESS NOTES
Progress Note - Brandie Dietz 1940, 77 y o  male MRN: [id number]    Unit/Bed#: ICU 01 Encounter: [id number]    Primary Care Provider: Komal Samson DO   Date and time admitted to hospital: 2/3/2019  8:54 AM        Neuroendocrine tumor   Assessment & Plan    · Patient with abdominal mesenteric mass with lesions in the liver  · Patient had biopsy of liver lesion on 01/24/19  · 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  · Patient was unaware of results, these results were relayed by Dr Shayla Costello to patient yesterday  · Dr Luke Sen made aware over the phone regarding biopsy results  He stated that he will see the patient in the office as an outpatient no need for any urgent intervention at this time  · Patient is not having any diarrhea at this time however if patient does develop diarrhea that is uncontrolled and non infectious could be related to patient's tumor and may benefit from octreotide     Anemia   Assessment & Plan    · Likely anemia of chronic disease  · Will monitor H&H  · Transfuse as needed     Acute renal failure superimposed on stage 3 chronic kidney disease ([facility] [address] )   Assessment & Plan    · Patient with CT abdomen/pelvis showing bilateral mild hydroureteronephrosis which is unchanged from previous CT scan done a few weeks ago  · Modest improvement of creatinine  · Will continue IV fluids  · Nephrology Neurology consult  · Trend renal function     Hypertension   Assessment & Plan    · BP noted to be elevated  · Will continue amlodipine  · Hydralazine p r n       Diabetes mellitus (HCC)   Assessment & Plan    · Glucose improving  · Continue current insulin regimen  · Titrate insulin as necessary     * Acute cystitis without hematuria   Assessment & Plan    · Patient with Haile catheter placed a few weeks ago due to obstruction  · Haile replaced  · Continue IV antibiotics  · Follow up cultures         VTE Pharmacologic Prophylaxis: Pharmacologic: Enoxaparin (Lovenox)    Patient Centered Rounds: I have performed bedside rounds with nursing staff today  Discussions with Specialists or Other Care Team Provider: nephrology  Education and Discussions with Family / Patient: patient    Time Spent for Care: 20 minutes  More than 50% of total time spent on counseling and coordination of care as described above  Current Length of Stay: 1 day(s)    Current Patient Status: Inpatient   Certification Statement: The patient will continue to require additional inpatient hospital stay due to [facility], uti    Discharge Plan: pending hospital course    Code Status: Level 1 - Full Code    Subjective:   Patient feeling fatigued this morning, otherwise no acute events    Objective:     Vitals:   Temp (24hrs), Av 6 °F (37 °C), Min:97 4 °F (36 3 °C), Max:99 4 °F (37 4 °C)    Temp:  [97 4 °F (36 3 °C)-99 4 °F (37 4 °C)] 99 °F (37 2 °C)  HR:  [78-99] 78  Resp:  [12-29] 21  BP: ()/(55-76) 112/58  SpO2:  [93 %-100 %] 94 %  Body mass index is 22 96 kg/m²  Input and Output Summary (last 24 hours): Intake/Output Summary (Last 24 hours) at 19 0804  Last data filed at 19 5365   Gross per 24 hour   Intake           2060 8 ml   Output             1680 ml   Net            380 8 ml       Physical Exam:     Physical Exam   Constitutional: He is oriented to person, place, and time  He appears well-developed and well-nourished  No distress  HENT:   Head: Normocephalic and atraumatic  Eyes: Pupils are equal, round, and reactive to light  EOM are normal    Neck: Normal range of motion  Neck supple  Cardiovascular: Normal rate and regular rhythm  Pulmonary/Chest: Effort normal and breath sounds normal  No respiratory distress  Abdominal: Soft  Bowel sounds are normal    Musculoskeletal: Normal range of motion  He exhibits no edema  Neurological: He is alert and oriented to person, place, and time  Skin: Skin is warm  Psychiatric: He has a normal mood and affect   His behavior is normal  Thought content normal    Nursing note and vitals reviewed  Additional Data:     Labs:      Results from last 7 days  Lab Units 02/04/19  0431 02/03/19  0910   WBC Thousand/uL 13 20* 16 80*   HEMOGLOBIN g/dL 8 7* 9 5*   HEMATOCRIT % 25 7* 28 5*   PLATELETS Thousands/uL 150 168   NEUTROS PCT %  --  87*   LYMPHS PCT %  --  2*   MONOS PCT %  --  10   EOS PCT %  --  0       Results from last 7 days  Lab Units 02/04/19  0431 02/03/19  0910   POTASSIUM mmol/L 4 2 4 3   CHLORIDE mmol/L 102 96*   CO2 mmol/L 24 25   BUN mg/dL 59* 67*   CREATININE mg/dL 2 90* 3 06*   CALCIUM mg/dL 9 2 9 2   ALK PHOS U/L  --  101   ALT U/L  --  17   AST U/L  --  17       Results from last 7 days  Lab Units 02/03/19  0910   INR  1 23       Results from last 7 days  Lab Units 02/04/19  0728 02/03/19  2206 02/03/19  1605 02/03/19  1448 02/03/19  1415 02/03/19  1324 02/03/19  1200 02/03/19  0858 01/30/19  0604 01/29/19  2101 01/29/19  1628 01/29/19  1055   POC GLUCOSE mg/dl 231* 336* 272* 358* 387* 394* 385* 396* 225* 406* 339* 374*           * I Have Reviewed All Lab Data Listed Above  * Additional Pertinent Lab Tests Reviewed:  Laura [facility] Admission  Reviewed    Imaging:  Imaging Reports Reviewed Today Include: n/a    Recent Cultures (last 7 days):           Last 24 Hours Medication List:     Current Facility-Administered Medications:  acetaminophen 650 mg Oral Q4H PRN Ronaldo Granado PA-C   amLODIPine 5 mg Oral Daily Sujatah Holt MD   cefTRIAXone 1,000 mg Intravenous Q24H Sujatha Holt MD   enoxaparin 30 mg Subcutaneous Daily Sujatha Holt MD   hydrALAZINE 5 mg Intravenous Q6H PRN Sujatha Holt MD   insulin glargine 10 Units Subcutaneous HS Sujatha Holt MD   insulin glargine 10 Units Subcutaneous HS Zaira Mercado MD   insulin lispro 1-5 Units Subcutaneous TID AC Sujatha Holt MD   insulin lispro 1-5 Units Subcutaneous HS Eladio Monzon PA-C   ondansetron 4 mg Intravenous Q6H PRN Ronaldo Granado PA-C Today, Patient Was Seen By: Aj Damon MD    ** Please Note: Dictation voice to text software may have been used in the creation of this document   **

## 2019-02-04 NOTE — ASSESSMENT & PLAN NOTE
· Patient with abdominal mesenteric mass with lesions in the liver  · Patient had biopsy of liver lesion on 01/24/19  · 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  · Patient was unaware of results, these results were relayed by Dr Rowan Poster to patient yesterday  · Dr Loreto Renner made aware over the phone regarding biopsy results    He stated that he will see the patient in the office as an outpatient no need for any urgent intervention at this time  · Patient is not having any diarrhea at this time however if patient does develop diarrhea that is uncontrolled and non infectious could be related to patient's tumor and may benefit from octreotide

## 2019-02-04 NOTE — CONSULTS
Consultation - Urology   Emilia Schilling 77 y o  male MRN: [id number]  Unit/Bed#: ICU 01 Encounter: [id number]      Assessment/Plan      Assessment:  Urinary retention with indwelling Haile catheter  Positive urinalysis likely secondary to colonization of the indwelling Haile  Mild to moderate bilateral hydroureteronephrosis down to the bladder, which is thick walled  Renal function slowly improving  May be sequela of chronic bladder outlet obstruction  No mass effect obvious on the CT scan  Plan:  Continue Haile catheter  I expect positive urine culture due to bacterial colonization of the indwelling Haile  I discussed the options and risks with the patient, his family at the bedside and the hospitalists  We will follow his renal function closely  Diuretic renal scan could help to differentiate obstruction, but his creatinine may still be too high to get an accurate study  If his renal function worsens, cystoscopy with retrogrades and stents would be a consideration, although I am not sure if this would definitely help his renal function  I will continue to follow along with you  Further management depends upon clinical course  History of Present Illness   Attending: Celeste Kitchen MD  Reason for Consult / Principal Problem:  Hydronephrosis, urinary retention  HPI: Emilia Schilling is a 77y o  year old male who presents with generalized weakness and found to be in DKA  He was recently discovered to have a neuroendocrine tumor with a mass at the root of the mesentery in the right lower quadrant  Liver was also suspicious for metastatic disease  CT scan also showed mild to moderate bilateral hydroureteronephrosis which had not significantly changed since his previous CT scan from January 23, 2019  However, I did review the studies and there does appear to be some mild decompression bilaterally    He also had significant urinary retention upon presentation about 2 weeks ago and since has been managed with an indwelling Haile catheter  His creatinine has trended downward, but still remains elevated at 2 9  He denies any upper abdominal or flank pain  The Haile has been draining well with clear urine  Urinalysis in the emergency room was positive and the Haile catheter was changed yesterday  Consults    Review of Systems   Gastrointestinal: Negative for abdominal distention and abdominal pain         Historical Information   Past Medical History:   Diagnosis Date    Cardiac disease     Coronary artery disease     Diabetes mellitus ([address] )     Hyperlipidemia     Hypertension      Past Surgical History:   Procedure Laterality Date    IR IMAGE GUIDED BIOPSY/ASPIRATION LIVER  1/24/2019     Social History   History   Alcohol Use No     History   Drug Use No     History   Smoking Status    Never Smoker   Smokeless Tobacco    Never Used     Family History: non-contributory    Meds/Allergies   current meds:   Current Facility-Administered Medications   Medication Dose Route Frequency    acetaminophen (TYLENOL) tablet 650 mg  650 mg Oral Q4H PRN    amLODIPine (NORVASC) tablet 5 mg  5 mg Oral Daily    cefTRIAXone (ROCEPHIN) IVPB (premix) 1,000 mg  1,000 mg Intravenous Q24H    dextrose 5 % and sodium chloride 0 45 % infusion  250 mL/hr Intravenous Continuous    enoxaparin (LOVENOX) subcutaneous injection 30 mg  30 mg Subcutaneous Daily    hydrALAZINE (APRESOLINE) injection 5 mg  5 mg Intravenous Q6H PRN    insulin regular (HumuLIN R,NovoLIN R) 1 Units/mL in sodium chloride 0 9 % 100 mL infusion  0 1-30 Units/hr Intravenous Continuous    insulin regular (HumuLIN R,NovoLIN R) injection 6 Units  6 Units Intravenous Once    ondansetron (ZOFRAN) injection 4 mg  4 mg Intravenous Q6H PRN    sodium chloride 0 9 % infusion  1,000 mL/hr Intravenous Continuous    Followed by    sodium chloride 0 9 % infusion  500 mL/hr Intravenous Continuous    Followed by    sodium chloride 0 9 % infusion  250 mL/hr Intravenous Continuous     No Known Allergies    Objective   Vitals: Blood pressure 141/64, pulse 95, temperature 97 9 °F (36 6 °C), temperature source Tympanic, resp  rate (!) 23, height 5' 8" (1 727 m), weight 68 5 kg (151 lb), SpO2 (!) 86 %  I/O last 24 hours: In: 2620 8 [P O :960; I V :410 8; IV Piggyback:1250]  Out: 1680 [Urine:1680]    Invasive Devices     Peripheral Intravenous Line            Peripheral IV Right Antecubital -- days          Drain            Urethral Catheter Straight-tip 16 Fr  less than 1 day                Physical Exam   Abdominal: Soft  He exhibits no distension  There is no tenderness  Genitourinary: Penis normal     With subcoronal hypospadias  Haile catheter in place draining clear yellow urine  Testicles normal bilaterally  Lab Results:   CBC:   Lab Results   Component Value Date    WBC 13 20 (H) 02/04/2019    HGB 8 7 (L) 02/04/2019    HCT 25 7 (L) 02/04/2019    MCV 90 02/04/2019     02/04/2019    MCH 30 7 02/04/2019    MCHC 34 1 02/04/2019    RDW 13 0 02/04/2019    MPV 9 5 02/04/2019     CMP:   Lab Results   Component Value Date    SODIUM 136 02/04/2019     02/04/2019    CO2 24 02/04/2019    BUN 59 (H) 02/04/2019    CREATININE 2 90 (H) 02/04/2019    CALCIUM 9 2 02/04/2019    EGFR 20 02/04/2019     Urinalysis: No results found for: Verta Palmer, SPECGRAV, PHUR, LEUKOCYTESUR, NITRITE, PROTEINUA, GLUCOSEU, KETONESU, BILIRUBINUR, BLOODU  Urine Culture: No results found for: URINECX  Imaging Studies: I have personally reviewed pertinent reports  EKG, Pathology, and Other Studies: I have personally reviewed pertinent reports  VTE Prophylaxis: Sequential compression device (Venodyne)     Code Status: Level 1 - Full Code  Advance Directive and Living Will:      Power of :    POLST:      Counseling / Coordination of Care  Total floor / unit time spent today 45 minutes   Greater than 50% of total time was spent with the patient and / or family counseling and / or coordination of care  A description of the counseling / coordination of care:  I have discussed the case with the hospitalists

## 2019-02-04 NOTE — NURSING NOTE
Adjusting insulin drip per protocol  Continuing with blood sugar checks every 1 hr for now  Safety maintained

## 2019-02-04 NOTE — PLAN OF CARE
Problem: OCCUPATIONAL THERAPY ADULT  Goal: Performs self-care activities at highest level of function for planned discharge setting  See evaluation for individualized goals  Treatment Interventions: Energy conservation, Activityengagement, ADL retraining, Functional transfer training, UE strengthening/ROM, Endurance training, Patient/family training          See flowsheet documentation for full assessment, interventions and recommendations  Outcome: Progressing  Limitation: Decreased ADL status, Decreased UE strength, Decreased Safe judgement during ADL, Decreased endurance, Decreased self-care trans  Prognosis: Good  Assessment: Pt is a 77 y o  male seen for OT evaluation s/p admit to [facility] on 2/3/2019 w/ Acute cystitis without hematuria  Comorbidities affecting pt's functional performance at time of assessment include: DM, HTN, cancer history and ARF/CKD III, Anemia, Generalized weakness,   Personal factors affecting pt at time of IE include:steps to enter environment and difficulty performing ADLS  Prior to admission, pt was I with self care ADL's, functional mobility/toileting/transfers with JESUS MANUEL Chua Upon evaluation: Pt requires an increased level of assistance with self care ADL's and functional transfers/mobility/toileting r/t the following deficits impacting occupational performance: weakness, decreased strength, decreased balance, decreased tolerance and decreased safety awareness  Pt to benefit from continued skilled OT tx while in the hospital to address deficits as defined above and maximize level of functional independence w ADL's and functional mobility  Occupational Performance areas to address include: bathing/shower, dressing and functional mobility  From OT standpoint, recommendation at time of d/c would be STR         OT Discharge Recommendation: Short Term Rehab  OT - OK to Discharge: [place]ler Collado OT

## 2019-02-04 NOTE — SOCIAL WORK
CM met with pt and SO Shelia Casiano at bedside and explained role  Pt lives with his SO in a 1 story house; 3 B[address]  Pt does not use any assistive device to ambulate  He has cane, walker, BSC and w/c at home  Pt reports he is completely independent with ADL's and continues to drive  He indicates his SO or son will transport him home upon discharge  Pt PCP is Dr Antohny Hassan , He uses [facility] in [place]  Pt denies any history of SNF, MH or D&A treatment  Pt denies the need for any discharge services at this time  STR recommended by PT after evaluation  CM discussed same with pt at bedside  Pt agreeable to same  He was given choices and chose referral to [place] and [facility]  Pt requested [address] but same is not an in network facility for his insurance  CM to make referrals as requested and will continue to follow  CM reviewed d/c planning process including the following: identifying help at home, patient preference for d/c planning needs, availability of treatment team to discuss questions or concerns patient and/or family may have regarding understanding medications and recognizing signs and symptoms once discharged  CM also encouraged patient to follow up with all recommended appointments after discharge  Patient advised of importance for patient and family to participate in managing patients medical well being

## 2019-02-04 NOTE — OCCUPATIONAL THERAPY NOTE
Occupational Therapy Evaluation      [facility]    [facility]    Patient Active Problem List   Diagnosis    Diarrhea    Weakness generalized    Diabetes mellitus ([facility] [address] )    Hypertension    Hyperlipidemia    Cardiac disease    Acute renal failure superimposed on stage 3 chronic kidney disease (HCC)    Generalized abdominal mass    Hydroureter    Anemia    Vasovagal syncope    Accelerated hypertension    Liver mass    Neuroendocrine tumor    Acute cystitis without hematuria       Past Medical History:   Diagnosis Date    Cardiac disease     Coronary artery disease     Diabetes mellitus ([facility] [address] )     Hyperlipidemia     Hypertension        Past Surgical History:   Procedure Laterality Date    IR IMAGE GUIDED BIOPSY/ASPIRATION LIVER  1/24/2019 02/04/19 0932   Note Type   Note type Eval/Treat   Restrictions/Precautions   Weight Bearing Precautions Per Order No   Other Precautions Fall Risk;O2;Telemetry;Multiple lines;Contact/isolation;Droplet precautions;Hard of hearing   Pain Assessment   Pain Assessment No/denies pain   Pain Score No Pain   Home Living   Type of [address] [place] [address] One level;Stairs to enter with rails; Able to live on main level with bedroom/bathroom;Stairs to enter without rails  (3 BETH)   Bathroom Shower/Tub Tub/shower unit   Bathroom Toilet Standard   Bathroom Equipment Shower chair;Commode   [facility]; Wheelchair-manual   Additional Comments ambulatory w/ RW   Prior Function   Level of [place] Independent with ADLs and functional mobility   Lives With Significant other   Receives Help From Family   ADL Assistance Independent   IADLs Needs assistance   Falls in the last 6 months 1 to 4  (twice per pt)   Vocational Retired   Comments RW to ambulate, I self toileting   Psychosocial   Psychosocial (WDL) WDL   Subjective   Subjective Declined getting OOB to chair, states feels weak/fatigues   ADL   Eating Assistance 7 Independent   Eating Deficit Setup   Grooming Assistance 3  Moderate Assistance   UB Bathing Assistance 3  Moderate Assistance   LB Bathing Assistance 2  Maximal Assistance   UB Dressing Assistance 3  Moderate Assistance   LB Dressing Assistance 1  Total Assistance   Bed Mobility   Supine to Sit 3  Moderate assistance   Additional items Assist x 2;Bedrails;HOB elevated;Verbal cues;LE management; Increased time required   Sit to Supine 3  Moderate assistance   Additional items Assist x 2;Bedrails; Increased time required;Verbal cues;LE management   Additional Comments sit EOB X 2'   Transfers   Sit to Stand 4  Minimal assistance   Additional items Assist x 2;Bedrails   Stand to Sit 4  Minimal assistance   Additional items Assist x 2;Verbal cues; Increased time required; Bedrails   Toilet transfer Unable to assess   Additional Comments several side steps to position for return to bed   Functional Mobility   Functional Mobility (unable to assess)   Balance   Static Sitting Fair +   Dynamic Sitting Fair   Static Standing Fair -   Dynamic Standing Fair -   Activity Tolerance   Activity Tolerance Patient limited by fatigue;Treatment limited secondary to medical complications (Comment)  (fatigue/weakness)   Nurse Made Aware yes   RUE Assessment   RUE Assessment (strength grosslt F+)   LUE Assessment   LUE Assessment (strength grossly F+)   Hand Function   Gross Motor Coordination Functional   Fine Motor Coordination Functional   Cognition   Overall Cognitive Status WFL   Arousal/Participation Alert; Cooperative   Attention Within functional limits   Orientation Level Oriented X4   Memory Within functional limits   Following Commands Follows one step commands with increased time or repetition   Assessment   Limitation Decreased ADL status; Decreased UE strength;Decreased Safe judgement during ADL;Decreased endurance;Decreased self-care trans   Prognosis Good   Assessment Pt is a 77 y o  male seen for OT evaluation s/p admit to [address] [place] [address] [facility] on 2/3/2019 w/ Acute cystitis without hematuria  Comorbidities affecting pt's functional performance at time of assessment include: DM, HTN, cancer history and ARF/CKD III, Anemia, Generalized weakness,   Personal factors affecting pt at time of IE include:steps to enter environment and difficulty performing ADLS  Prior to admission, pt was I with self care ADL's, functional mobility/toileting/transfers with JESUS MANUEL Arredondo Upon evaluation: Pt requires an increased level of assistance with self care ADL's and functional transfers/mobility/toileting r/t the following deficits impacting occupational performance: weakness, decreased strength, decreased balance, decreased tolerance and decreased safety awareness  Pt to benefit from continued skilled OT tx while in the hospital to address deficits as defined above and maximize level of functional independence w ADL's and functional mobility  Occupational Performance areas to address include: bathing/shower, dressing and functional mobility  From OT standpoint, recommendation at time of d/c would be STR  Goals   Patient Goals feel stronger/better and do more   STG Time Frame 3-5   Short Term Goal #1 pt will demonstrate good knowledge re: adaptations to enable increased participation in self care ADL's    Short Term Goal #2 increase bed mobility to Min A,  to prepare for self care ADL's   LTG Time Frame 7-10   Long Term Goal #1 increase UB self care to Min A following set up/progress to LB as safely able, to MI as indicated   Long Term Goal #2 increase bed mobility to MI for increased I with self care and to prepare for self toileting   Long Term Goal increase UE strength X 1/2 grade, for increased  ability to safely assist with functional transfers   ADL Goals   Pt Will Perform Toileting With mod indep; With safety/supervision; With cues   Plan   Treatment Interventions Energy conservation; Activityengagement;ADL retraining;Functional transfer training;UE strengthening/ROM; Endurance training;Patient/family training   Goal Expiration Date 02/14/19   Treatment Day 0   OT Frequency 3-5x/wk   Recommendation   OT Discharge Recommendation Short Term Rehab   OT - OK to Discharge No   Barthel Index   Feeding 5   Bathing 0   Grooming Score 0   Dressing Score 5   Bladder Score 0   Bowels Score 5   Toilet Use Score 5   Transfers (Bed/Chair) Score 5   Mobility (Level Surface) Score 0   Stairs Score 0   Barthel Index Score 25   Addis Poe, OT

## 2019-02-05 LAB
ALBUMIN SERPL BCP-MCNC: 2.9 G/DL (ref 3.5–5.7)
ALP SERPL-CCNC: 102 U/L (ref 55–165)
ALT SERPL W P-5'-P-CCNC: 18 U/L (ref 7–52)
ANION GAP SERPL CALCULATED.3IONS-SCNC: 9 MMOL/L (ref 4–13)
AST SERPL W P-5'-P-CCNC: 20 U/L (ref 13–39)
BACTERIA UR CULT: ABNORMAL
BACTERIA UR CULT: ABNORMAL
BETA-HYDROXYBUTYRATE: 0.11 MMOL/L (ref 0.02–0.27)
BILIRUB SERPL-MCNC: 0.3 MG/DL (ref 0.2–1)
BUN SERPL-MCNC: 41 MG/DL (ref 7–25)
CALCIUM SERPL-MCNC: 8.5 MG/DL (ref 8.6–10.5)
CHLORIDE SERPL-SCNC: 107 MMOL/L (ref 98–107)
CO2 SERPL-SCNC: 22 MMOL/L (ref 21–31)
CREAT SERPL-MCNC: 2.22 MG/DL (ref 0.7–1.3)
ERYTHROCYTE [DISTWIDTH] IN BLOOD BY AUTOMATED COUNT: 12.7 % (ref 11.5–14.5)
GFR SERPL CREATININE-BSD FRML MDRD: 27 ML/MIN/1.73SQ M
GLUCOSE SERPL-MCNC: 155 MG/DL (ref 65–99)
GLUCOSE SERPL-MCNC: 171 MG/DL (ref 65–140)
GLUCOSE SERPL-MCNC: 326 MG/DL (ref 65–140)
GLUCOSE SERPL-MCNC: 359 MG/DL (ref 65–140)
GLUCOSE SERPL-MCNC: 373 MG/DL (ref 65–140)
GLUCOSE SERPL-MCNC: 49 MG/DL (ref 65–140)
GLUCOSE SERPL-MCNC: 57 MG/DL (ref 65–140)
GLUCOSE SERPL-MCNC: 71 MG/DL (ref 65–140)
HCT VFR BLD AUTO: 22.9 % (ref 36.5–49.3)
HGB BLD-MCNC: 7.6 G/DL (ref 14–18)
INR PPP: 1.14 (ref 0.9–1.5)
LG PLATELETS BLD QL SMEAR: PRESENT
MCH RBC QN AUTO: 30.4 PG (ref 26–34)
MCHC RBC AUTO-ENTMCNC: 33.2 G/DL (ref 31–37)
MCV RBC AUTO: 92 FL (ref 81–99)
PLATELET # BLD AUTO: 155 THOUSANDS/UL (ref 149–390)
PLATELET BLD QL SMEAR: ADEQUATE
PLATELET CLUMP BLD QL SMEAR: NORMAL
PMV BLD AUTO: 10 FL (ref 8.6–11.7)
POTASSIUM SERPL-SCNC: 3.9 MMOL/L (ref 3.5–5.5)
PROT SERPL-MCNC: 6.1 G/DL (ref 6.4–8.9)
PROTHROMBIN TIME: 13.3 SECONDS (ref 10.2–13)
RBC # BLD AUTO: 2.51 MILLION/UL (ref 4.3–5.9)
RBC MORPH BLD: NORMAL
SODIUM SERPL-SCNC: 138 MMOL/L (ref 134–143)
WBC # BLD AUTO: 14.4 THOUSAND/UL (ref 4.8–10.8)

## 2019-02-05 PROCEDURE — 80053 COMPREHEN METABOLIC PANEL: CPT | Performed by: INTERNAL MEDICINE

## 2019-02-05 PROCEDURE — 99232 SBSQ HOSP IP/OBS MODERATE 35: CPT | Performed by: INTERNAL MEDICINE

## 2019-02-05 PROCEDURE — 97530 THERAPEUTIC ACTIVITIES: CPT

## 2019-02-05 PROCEDURE — 85610 PROTHROMBIN TIME: CPT | Performed by: INTERNAL MEDICINE

## 2019-02-05 PROCEDURE — 82948 REAGENT STRIP/BLOOD GLUCOSE: CPT

## 2019-02-05 PROCEDURE — 85027 COMPLETE CBC AUTOMATED: CPT | Performed by: INTERNAL MEDICINE

## 2019-02-05 PROCEDURE — 99222 1ST HOSP IP/OBS MODERATE 55: CPT | Performed by: NURSE PRACTITIONER

## 2019-02-05 PROCEDURE — 82010 KETONE BODYS QUAN: CPT | Performed by: INTERNAL MEDICINE

## 2019-02-05 RX ORDER — CEFTRIAXONE 1 G/50ML
1000 INJECTION, SOLUTION INTRAVENOUS EVERY 24 HOURS
Status: DISCONTINUED | OUTPATIENT
Start: 2019-02-05 | End: 2019-02-06

## 2019-02-05 RX ADMIN — INSULIN LISPRO 1 UNITS: 100 INJECTION, SOLUTION INTRAVENOUS; SUBCUTANEOUS at 09:11

## 2019-02-05 RX ADMIN — SODIUM CHLORIDE 125 ML/HR: 9 INJECTION, SOLUTION INTRAVENOUS at 06:55

## 2019-02-05 RX ADMIN — INSULIN LISPRO 6 UNITS: 100 INJECTION, SOLUTION INTRAVENOUS; SUBCUTANEOUS at 11:25

## 2019-02-05 RX ADMIN — AMLODIPINE BESYLATE 5 MG: 5 TABLET ORAL at 09:12

## 2019-02-05 RX ADMIN — INSULIN LISPRO 6 UNITS: 100 INJECTION, SOLUTION INTRAVENOUS; SUBCUTANEOUS at 16:45

## 2019-02-05 RX ADMIN — CEFTRIAXONE 1000 MG: 1 INJECTION, SOLUTION INTRAVENOUS at 09:11

## 2019-02-05 RX ADMIN — INSULIN LISPRO 3 UNITS: 100 INJECTION, SOLUTION INTRAVENOUS; SUBCUTANEOUS at 21:34

## 2019-02-05 RX ADMIN — INSULIN DETEMIR 10 UNITS: 100 INJECTION, SOLUTION SUBCUTANEOUS at 21:34

## 2019-02-05 RX ADMIN — INSULIN LISPRO 6 UNITS: 100 INJECTION, SOLUTION INTRAVENOUS; SUBCUTANEOUS at 12:26

## 2019-02-05 RX ADMIN — ENOXAPARIN SODIUM 30 MG: 40 INJECTION SUBCUTANEOUS at 09:12

## 2019-02-05 NOTE — ASSESSMENT & PLAN NOTE
· Glucose improving  · Patient was titrated off of insulin drip yesterday  · Currently on subcutaneous insulin  · Keep close eye on glucose

## 2019-02-05 NOTE — ASSESSMENT & PLAN NOTE
· Patient with abdominal mesenteric mass with lesions in the liver  · Patient had biopsy of liver lesion on 01/24/19  · 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  · Patient was unaware of results, these results were relayed by Dr René Correia to patient yesterday  · Dr Mayuri Gastelum made aware over the phone regarding biopsy results    Oncology will be seeing patient while in-house to answer their questions  · Patient is not having any diarrhea at this time however if patient does develop diarrhea that is uncontrolled and non infectious could be related to patient's tumor and may benefit from octreotide

## 2019-02-05 NOTE — ASSESSMENT & PLAN NOTE
· Likely anemia of chronic disease, modestly lower today  · Will monitor H&H  · Transfuse for hemoglobin less than 7 5

## 2019-02-05 NOTE — PLAN OF CARE
Problem: PHYSICAL THERAPY ADULT  Goal: Performs mobility at highest level of function for planned discharge setting  See evaluation for individualized goals  Treatment/Interventions: Functional transfer training, LE strengthening/ROM, Therapeutic exercise, Endurance training, Equipment eval/education, Bed mobility, Gait training, Compensatory technique education, OT (and neuro re-education w/ balance training)  Equipment Recommended: [employer] (patient has own)     See flowsheet documentation for full assessment, interventions and recommendations  Cornelio Burgess, PT       Outcome: Progressing  Prognosis: Fair  Problem List: Decreased strength, Decreased endurance, Impaired balance, Decreased mobility, Decreased coordination, Impaired judgement, Decreased safety awareness  Assessment: Pt seen for PT treatment session this date with interventions consisting of therapeutic activity consisting of training: supine<>sit transfers, sit<>stand transfers, static sitting tolerance at EOB for 2-3 minutes w/ B UE support and stand pivot transfer bed to chair with RW min x 2  Pt agreeable to PT treatment session upon arrival, pt found supine in bed w/ HOB elevated, in no apparent distress  In comparison to previous session, pt with improvements in activity tolerance  Post session: all needs in reach in bedside chair, all needs in reach, SCDs active & wife present  Continue to recommend STR at time of d/c in order to maximize pt's functional independence and safety w/ mobility  Pt continues to be functioning below baseline level, and remains limited 2* factors listed above and including decreased strength, endurance & safe functional mobility  PT will continue to see pt while here in order to address the deficits listed above and provide interventions consistent w/ POC in effort to achieve STGs          Recommendation: Short-term skilled PT     PT - OK to Discharge: Yes (when med cleared to STR)    See flowsheet documentation for full assessment     Patrica Torres, PTA

## 2019-02-05 NOTE — NURSING NOTE
Family made aware that RIKA from Dr Wily Roper office was in to see patient  Labs ordered from am   Contact information given to family

## 2019-02-05 NOTE — CONSULTS
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: The patient was educated about his neuroendocrine tumor  We also discussed carcinoid syndrome which is most likely present in his situation due to the chronic diarrhea and facial flushing  He was provided with educational materials and we also discussed lifestyle changes to decrease carcinoid flares  We will check a baseline chromogranin level with next blood draw and check 24 hr urine for 5 HIAA  We will also arrange for outpatient follow-up in 2-3 weeks to discuss further treatment options/follow-up care  Patient will most likely benefit from a somatostatin analog such as Lanreotide as an outpatient  2  Normocytic anemia:  Most likely multifactorial including anemia of chronic disease, infection and malignancy  Hemoglobin dropped this morning to 7 6  Patient admits that he does have history of vitamin B12 and iron deficiency  We will order comprehensive anemia workup and correct any deficiencies if needed  Ok to transfuse patient with 1 unit of packed red blood cells if he would become symptomatic and/or hemoglobin drops below 7 5  3  Leukocytosis:  No recent differential, last differential done was 02/03/2019 showed neutrophilia without any immature cells  Most likely due to infection/inflammation  Continue to monitor/trend WBC  Reason for consultation:  Neuroendocrine tumor of the GI tract    History of present illness:   Patient is a pleasant 14-year-old  male who has had multiple admissions over the past month  During his 1st admission (1/23/19 admit) at [address] [place] [address], CT evaluation of the abdomen and pelvis was performed which revealed a partially calcified mesenteric mass concerning for malignant lesion suspicious for carcinoid with numerous low-density liver masses    He also had bilateral hydronephrosis, prostatomegaly and a questionable secondary small mass in the left lower quadrant mesentery  Patient had 1 of the liver masses biopsied in IR on 01/24/2019, findings were consistent with well-differentiated neuroendocrine tumor grade 2, Ki-67 mitotic proliferative index of approximately 3-5%  Patient was readmitted to [facility] 01/27/2019 with weakness and a vasovagal syncopal event  And most recently readmitted to [facility] 2/3/19 with weakness, acute cystitis and acute renal failure  He is currently being treated with IV antibiotics and urology is following  We were asked to see patient and comment on his newly diagnosed neuroendocrine tumor  Patient states that he had upper and lower endoscopy recently about 6 months ago  He states that there were no significant findings on the colonoscopy and upper endoscopy revealed GERD  He denies diarrhea at present and states that his bowel movement was formed this morning  But admits prior to his initial admission he was experiencing diarrhea which she feels improved after discontinuing metformin  Upon further questioning patient does admit that he has been experiencing diarrhea on and off at least for the past 2 years  He denies chest pain or palpitation, admits to dyspnea on exertion  States that he often has a lot of gas discomfort/bloating  He admits that he has a history of anemia and was on iron and B12 supplements in the past     Review of Systems:   Review of Systems   Constitutional: Positive for activity change  Negative for appetite change (states he currently has a good appetite and is eating well), chills, fatigue and fever  Denies pain   HENT: Negative for mouth sores, nosebleeds, sore throat and trouble swallowing  Eyes: Negative  Respiratory: Positive for shortness of breath (exertional)  Negative for cough and chest tightness      Cardiovascular: Negative for chest pain, palpitations and leg swelling  Gastrointestinal: Positive for abdominal distention  Negative for abdominal pain, blood in stool, constipation, diarrhea (at present), nausea and vomiting         +gas and bloating   Genitourinary: Haile cath   Skin: Negative for color change, pallor and rash  White/cool fingers at times (?  Raynaud's phenomenon)   Neurological: Positive for weakness  Negative for dizziness, light-headedness, numbness and headaches  Hematological: Negative for adenopathy  Does not bruise/bleed easily  Psychiatric/Behavioral: Negative for dysphoric mood  The patient is not nervous/anxious  Past Medical History:   Diagnosis Date    Cardiac disease     Coronary artery disease     Diabetes mellitus ([facility] [address] )     Hyperlipidemia     Hypertension        Past Surgical History:   Procedure Laterality Date    IR IMAGE GUIDED BIOPSY/ASPIRATION LIVER  1/24/2019       Family History   Problem Relation Age of Onset    Cancer Mother     Hypertension Father     Cancer Brother     Cancer Maternal Grandmother     Cancer Paternal Aunt        Social History     Social History    Marital status:       Spouse name: N/A    Number of children: N/A    Years of education: N/A     Social History Main Topics    Smoking status: Never Smoker    Smokeless tobacco: Never Used    Alcohol use No    Drug use: No    Sexual activity: No     Other Topics Concern    None     Social History Narrative    None         Current Facility-Administered Medications:     acetaminophen (TYLENOL) tablet 650 mg, 650 mg, Oral, Q4H PRN, Patricia Argueta PA-C    amLODIPine (NORVASC) tablet 5 mg, 5 mg, Oral, Daily, Aysha Castaneda MD, 5 mg at 02/05/19 0912    cefTRIAXone (ROCEPHIN) IVPB (premix) 1,000 mg, 1,000 mg, Intravenous, Q24H, Karyle Mills, MD, Stopped at 02/05/19 0942    enoxaparin (LOVENOX) subcutaneous injection 30 mg, 30 mg, Subcutaneous, Daily, Aysha Castaneda MD, 30 mg at 02/05/19 0912    hydrALAZINE (APRESOLINE) injection 5 mg, 5 mg, Intravenous, Q6H PRN, Gideon Pierson MD    insulin detemir (LEVEMIR) subcutaneous injection 10 Units, 10 Units, Subcutaneous, HS, Isabelle Olson MD    insulin lispro (HumaLOG) 100 units/mL subcutaneous injection 1-5 Units, 1-5 Units, Subcutaneous, HS, Isabelle Olson MD    insulin lispro (HumaLOG) 100 units/mL subcutaneous injection 1-6 Units, 1-6 Units, Subcutaneous, TID AC, Isabelle Olson MD, 6 Units at 02/05/19 1125    insulin lispro (HumaLOG) 100 units/mL subcutaneous injection 6 Units, 6 Units, Subcutaneous, TID With Meals, Isabelle Olson MD, 6 Units at 02/05/19 1226    insulin regular (HumuLIN R,NovoLIN R) 1 Units/mL in sodium chloride 0 9 % 100 mL infusion, 0 1-30 Units/hr, Intravenous, Continuous, Isabelle Olson MD, Stopped at 02/04/19 2219    ondansetron (ZOFRAN) injection 4 mg, 4 mg, Intravenous, Q6H PRN, Ami Teixeira PA-C    Prescriptions Prior to Admission   Medication    amLODIPine (NORVASC) 5 mg tablet    aspirin 81 MG tablet    glipiZIDE (GLUCOTROL) 5 mg tablet       No Known Allergies      Physical Exam:    BP (!) 172/74 (BP Location: Left arm)   Pulse 97   Temp (!) 97 4 °F (36 3 °C) (Tympanic)   Resp 21   Ht 5' 8" (1 727 m)   Wt 68 5 kg (151 lb)   SpO2 93%   BMI 22 96 kg/m²     Physical Exam   Constitutional: He is oriented to person, place, and time  No distress  Frail appearing   HENT:   Head: Normocephalic and atraumatic  Eyes: Pupils are equal, round, and reactive to light  Conjunctivae are normal  No scleral icterus  Neck: Normal range of motion  Neck supple  Cardiovascular: Normal rate, regular rhythm, normal heart sounds and intact distal pulses  No murmur heard  Pulmonary/Chest: Effort normal and breath sounds normal  No respiratory distress  Abdominal: Soft  Bowel sounds are normal  He exhibits distension  There is hepatomegaly  There is no splenomegaly  There is no tenderness     Genitourinary:   Genitourinary Comments: Haile catheter draining clear yellow urine  Musculoskeletal: Normal range of motion  He exhibits no edema  Neurological: He is alert and oriented to person, place, and time  Skin: Skin is warm and dry  No rash noted  He is not diaphoretic  No erythema  No pallor  Mild facial flushing and slight nasal telangiectasia noted  Livedo reticularis noted to bilateral knees  Multiple fingers bilaterally white and cold to the touch  Psychiatric: He has a normal mood and affect  His behavior is normal  Judgment and thought content normal    Vitals reviewed        Recent Results (from the past 48 hour(s))   Fingerstick Glucose (POCT)    Collection Time: 02/03/19  2:15 PM   Result Value Ref Range    POC Glucose 387 (H) 65 - 140 mg/dl   Fingerstick Glucose (POCT)    Collection Time: 02/03/19  2:48 PM   Result Value Ref Range    POC Glucose 358 (H) 65 - 140 mg/dl   Fingerstick Glucose (POCT)    Collection Time: 02/03/19  4:05 PM   Result Value Ref Range    POC Glucose 272 (H) 65 - 140 mg/dl   Fingerstick Glucose (POCT)    Collection Time: 02/03/19 10:06 PM   Result Value Ref Range    POC Glucose 336 (H) 65 - 140 mg/dl   Basic metabolic panel    Collection Time: 02/04/19  4:31 AM   Result Value Ref Range    Sodium 136 134 - 143 mmol/L    Potassium 4 2 3 5 - 5 5 mmol/L    Chloride 102 98 - 107 mmol/L    CO2 24 21 - 31 mmol/L    ANION GAP 10 4 - 13 mmol/L    BUN 59 (H) 7 - 25 mg/dL    Creatinine 2 90 (H) 0 70 - 1 30 mg/dL    Glucose 239 (H) 65 - 99 mg/dL    Calcium 9 2 8 6 - 10 5 mg/dL    eGFR 20 ml/min/1 73sq m   CBC (With Platelets)    Collection Time: 02/04/19  4:31 AM   Result Value Ref Range    WBC 13 20 (H) 4 80 - 10 80 Thousand/uL    RBC 2 85 (L) 4 30 - 5 90 Million/uL    Hemoglobin 8 7 (L) 14 0 - 18 0 g/dL    Hematocrit 25 7 (L) 36 5 - 49 3 %    MCV 90 81 - 99 fL    MCH 30 7 26 0 - 34 0 pg    MCHC 34 1 31 0 - 37 0 g/dL    RDW 13 0 11 5 - 14 5 %    Platelets 190 597 - 725 Thousands/uL    MPV 9 5 8 6 - 11 7 fL   Procalcitonin    Collection Time: 02/04/19  4:31 AM   Result Value Ref Range    Procalcitonin 2 33 (H) <=0 25 ng/ml   Fingerstick Glucose (POCT)    Collection Time: 02/04/19  7:28 AM   Result Value Ref Range    POC Glucose 231 (H) 65 - 140 mg/dl   Fingerstick Glucose (POCT)    Collection Time: 02/04/19 10:58 AM   Result Value Ref Range    POC Glucose >500 (HH) 65 - 140 mg/dl   Fingerstick Glucose (POCT)    Collection Time: 02/04/19 11:00 AM   Result Value Ref Range    POC Glucose >500 (HH) 65 - 140 mg/dl   Creatinine, urine, random    Collection Time: 02/04/19 11:54 AM   Result Value Ref Range    Creatinine, Ur 32 1 mg/dL   Sodium, urine, random    Collection Time: 02/04/19 11:54 AM   Result Value Ref Range    Sodium, Ur 53    Fingerstick Glucose (POCT)    Collection Time: 02/04/19 12:00 PM   Result Value Ref Range    POC Glucose >500 (HH) 65 - 140 mg/dl   Basic metabolic panel    Collection Time: 02/04/19 12:33 PM   Result Value Ref Range    Sodium 131 (L) 134 - 143 mmol/L    Potassium 4 0 3 5 - 5 5 mmol/L    Chloride 97 (L) 98 - 107 mmol/L    CO2 23 21 - 31 mmol/L    ANION GAP 11 4 - 13 mmol/L    BUN 61 (H) 7 - 25 mg/dL    Creatinine 2 77 (H) 0 70 - 1 30 mg/dL    Glucose 463 (H) 65 - 99 mg/dL    Calcium 8 9 8 6 - 10 5 mg/dL    eGFR 21 ml/min/1 73sq m   Magnesium    Collection Time: 02/04/19 12:33 PM   Result Value Ref Range    Magnesium 1 6 (L) 1 9 - 2 7 mg/dL   Beta Hydroxybutyrate    Collection Time: 02/04/19 12:36 PM   Result Value Ref Range    BETA-HYDROXYBUTYRATE <0 1 0 02 - 0 27 mmol/L   Fingerstick Glucose (POCT)    Collection Time: 02/04/19  1:00 PM   Result Value Ref Range    POC Glucose 469 (H) 65 - 140 mg/dl   Fingerstick Glucose (POCT)    Collection Time: 02/04/19  2:00 PM   Result Value Ref Range    POC Glucose 436 (H) 65 - 140 mg/dl   Fingerstick Glucose (POCT)    Collection Time: 02/04/19  3:00 PM   Result Value Ref Range    POC Glucose 384 (H) 65 - 140 mg/dl   Fingerstick Glucose (POCT)    Collection Time: 02/04/19  4:11 PM Result Value Ref Range    POC Glucose 247 (H) 65 - 140 mg/dl   Fingerstick Glucose (POCT)    Collection Time: 02/04/19  5:07 PM   Result Value Ref Range    POC Glucose 128 65 - 140 mg/dl   Fingerstick Glucose (POCT)    Collection Time: 02/04/19  6:04 PM   Result Value Ref Range    POC Glucose 183 (H) 65 - 140 mg/dl   Fingerstick Glucose (POCT)    Collection Time: 02/04/19  7:19 PM   Result Value Ref Range    POC Glucose 138 65 - 140 mg/dl   Basic metabolic panel    Collection Time: 02/04/19  7:33 PM   Result Value Ref Range    Sodium 135 134 - 143 mmol/L    Potassium 3 6 3 5 - 5 5 mmol/L    Chloride 103 98 - 107 mmol/L    CO2 23 21 - 31 mmol/L    ANION GAP 9 4 - 13 mmol/L    BUN 49 (H) 7 - 25 mg/dL    Creatinine 2 37 (H) 0 70 - 1 30 mg/dL    Glucose 128 (H) 65 - 99 mg/dL    Calcium 8 5 (L) 8 6 - 10 5 mg/dL    eGFR 25 ml/min/1 73sq m   Magnesium    Collection Time: 02/04/19  7:33 PM   Result Value Ref Range    Magnesium 1 4 (L) 1 9 - 2 7 mg/dL   Phosphorus    Collection Time: 02/04/19  7:33 PM   Result Value Ref Range    Phosphorus 3 0 3 0 - 5 5 mg/dL   Fingerstick Glucose (POCT)    Collection Time: 02/04/19  8:16 PM   Result Value Ref Range    POC Glucose 149 (H) 65 - 140 mg/dl   Fingerstick Glucose (POCT)    Collection Time: 02/04/19  9:09 PM   Result Value Ref Range    POC Glucose 141 (H) 65 - 140 mg/dl   Fingerstick Glucose (POCT)    Collection Time: 02/05/19  3:09 AM   Result Value Ref Range    POC Glucose 49 (L) 65 - 140 mg/dl   Fingerstick Glucose (POCT)    Collection Time: 02/05/19  3:32 AM   Result Value Ref Range    POC Glucose 57 (L) 65 - 140 mg/dl   Fingerstick Glucose (POCT)    Collection Time: 02/05/19  3:47 AM   Result Value Ref Range    POC Glucose 71 65 - 140 mg/dl   CBC    Collection Time: 02/05/19  5:01 AM   Result Value Ref Range    WBC 14 40 (H) 4 80 - 10 80 Thousand/uL    RBC 2 51 (L) 4 30 - 5 90 Million/uL    Hemoglobin 7 6 (L) 14 0 - 18 0 g/dL    Hematocrit 22 9 (L) 36 5 - 49 3 %    MCV 92 81 - 99 fL    MCH 30 4 26 0 - 34 0 pg    MCHC 33 2 31 0 - 37 0 g/dL    RDW 12 7 11 5 - 14 5 %    Platelets 343 545 - 839 Thousands/uL    MPV 10 0 8 6 - 11 7 fL   Comprehensive metabolic panel    Collection Time: 02/05/19  5:01 AM   Result Value Ref Range    Sodium 138 134 - 143 mmol/L    Potassium 3 9 3 5 - 5 5 mmol/L    Chloride 107 98 - 107 mmol/L    CO2 22 21 - 31 mmol/L    ANION GAP 9 4 - 13 mmol/L    BUN 41 (H) 7 - 25 mg/dL    Creatinine 2 22 (H) 0 70 - 1 30 mg/dL    Glucose 155 (H) 65 - 99 mg/dL    Calcium 8 5 (L) 8 6 - 10 5 mg/dL    AST 20 13 - 39 U/L    ALT 18 7 - 52 U/L    Alkaline Phosphatase 102 55 - 165 U/L    Total Protein 6 1 (L) 6 4 - 8 9 g/dL    Albumin 2 9 (L) 3 5 - 5 7 g/dL    Total Bilirubin 0 30 0 20 - 1 00 mg/dL    eGFR 27 ml/min/1 73sq m   Beta Hydroxybutyrate    Collection Time: 02/05/19  5:01 AM   Result Value Ref Range    BETA-HYDROXYBUTYRATE 0 11 0 02 - 0 27 mmol/L   Fingerstick Glucose (POCT)    Collection Time: 02/05/19  5:01 AM   Result Value Ref Range    POC Glucose 171 (H) 65 - 140 mg/dl   Smear Review(Phlebs Do Not Order)    Collection Time: 02/05/19  5:01 AM   Result Value Ref Range    RBC Morphology Normal     Platelet Estimate Adequate Adequate    Clumped Platelets Rare     Large Platelet Present    Protime-INR    Collection Time: 02/05/19  5:02 AM   Result Value Ref Range    Protime 13 3 (H) 10 2 - 13 0 seconds    INR 1 14 0 90 - 1 50   Fingerstick Glucose (POCT)    Collection Time: 02/05/19 11:22 AM   Result Value Ref Range    POC Glucose 373 (H) 65 - 140 mg/dl       Ct Abdomen Pelvis Wo Contrast    Result Date: 2/3/2019  Narrative: CT ABDOMEN AND PELVIS WITHOUT IV CONTRAST INDICATION:   Hydronephrosis abd pain, known abd mass  Newly diagnosed metastatic neuroendocrine tumor   COMPARISON:  CT abdomen and pelvis 1/23/2018 TECHNIQUE:  CT examination of the abdomen and pelvis was performed without intravenous contrast   Axial, sagittal, and coronal 2D reformatted images were created from the source data and submitted for interpretation  Radiation dose length product (DLP) for this visit:  350 5 mGy-cm   This examination, like all CT scans performed in the [facility], was performed utilizing techniques to minimize radiation dose exposure, including the use of iterative reconstruction and automated exposure control  Enteric contrast was administered  FINDINGS: ABDOMEN LOWER CHEST:  No clinically significant abnormality identified in the visualized lower chest  LIVER/BILIARY TREE:  Multiple hypoattenuating lesions within the liver are again seen in keeping with known metastatic disease  GALLBLADDER:  No calcified gallstones  No pericholecystic inflammatory change  SPLEEN:  Unremarkable  PANCREAS:  Unremarkable  ADRENAL GLANDS:  Unremarkable  KIDNEYS/URETERS:  Mild to moderate bilateral hydroureteronephrosis is not significantly changed  No calculi are identified  STOMACH AND BOWEL:  Unremarkable  APPENDIX:  No findings to suggest appendicitis  ABDOMINOPELVIC CAVITY:  Calcified soft tissue mass within the root of the mesentery in the right lower quadrant is again seen and most suspicious for carcinoid tumor  This measures approximately 2 7 x 3 5 cm  VESSELS:  Unremarkable for patient's age  PELVIS REPRODUCTIVE ORGANS:  Unremarkable for patient's age  URINARY BLADDER:  Bladder is partially decompressed by Haile catheter  Circumferential bladder wall thickening is again noted and out of proportion to underdistention  ABDOMINAL WALL/INGUINAL REGIONS:  Unremarkable  OSSEOUS STRUCTURES:  No acute fracture or destructive osseous lesion  Impression: 1  No significant change in appearance of calcified mesenteric mass, appearance suspicious for carcinoid tumor  2   No significant change in multiple hypoattenuating lesions within the liver, biopsy-proven metastatic neuroendocrine tumor  3   No significant change in bilateral mild to moderate hydroureteronephrosis    No calculi identified  4   Unchanged circumferential bladder wall thickening  Workstation performed: [id number]     Ct Abdomen Pelvis Wo Contrast    Result Date: 1/23/2019  Narrative: CT ABDOMEN AND PELVIS WITHOUT IV CONTRAST INDICATION:   left sided ab pain, renal failure  COMPARISON:  None  TECHNIQUE:  CT examination of the abdomen and pelvis was performed without intravenous contrast   Axial, sagittal, and coronal 2D reformatted images were created from the source data and submitted for interpretation  Radiation dose length product (DLP) for this visit:  367 8 mGy-cm   This examination, like all CT scans performed in the [facility], was performed utilizing techniques to minimize radiation dose exposure, including the use of iterative reconstruction and automated exposure control  Enteric contrast was administered  FINDINGS: ABDOMEN LOWER CHEST:  Lung bases are clear  Coronary artery calcification noted  LIVER/BILIARY TREE:  There are numerous low density liver masses concerning for metastatic disease  These vary in size from quite small up to about 5 cm  Visualization limited without the benefit of contrast   Overall, the liver is not significantly enlarged  No gross evidence of biliary obstruction  GALLBLADDER:  No calcified gallstones  No pericholecystic inflammatory change  SPLEEN:  Unremarkable  PANCREAS:  Unremarkable  ADRENAL GLANDS:  Unremarkable  KIDNEYS/URETERS:  There is mild bilateral hydroureteronephrosis  No kidney stones are seen  No perirenal fluid  The cause of the obstruction seems to most likely be severe bladder wall thickening  STOMACH AND BOWEL:  There is no bowel obstruction  No bowel wall thickening or acute inflammation  APPENDIX:  No findings to suggest appendicitis  ABDOMINOPELVIC CAVITY:  There is a partially calcified and partially soft tissue density mass in the mesentery which has an irregular lobulated contour    The size is difficult to measure but it is on the order of 7 cm maximum diameter  This might represent carcinoid or desmoid or fibromatosis, however, given the presence of numerous liver lesions, it is probably a malignant lesion  There is questionably a 2nd much smaller lesion in the left lower quadrant adjacent to the sigmoid colon which also  demonstrates mixed density characteristics and is only about 11 x 14 mm  There is no ascites or free air  VESSELS:  Atherosclerotic changes are present  No evidence of aneurysm  PELVIS REPRODUCTIVE ORGANS:  There is prostatomegaly estimated to be 5 8 x 5 5 cm  URINARY BLADDER:  Bladder is collapsed around Haile catheter and appears quite thick walled diffusely  No stones are seen  No obvious inflammation  ABDOMINAL WALL/INGUINAL REGIONS:  Unremarkable  OSSEOUS STRUCTURES:  No acute fracture or destructive osseous lesion  Impression: Partially calcified mesenteric mass concerning for a malignant lesion, perhaps a carcinoid with numerous low-density liver masses almost certainly metastatic disease  Suggest consideration for tissue sampling with biopsy of one of the liver lesions  Bilateral hydroureteronephrosis probably secondary to severe bladder wall thickening  Haile catheter appear satisfactory in position with no significant distention of the bladder  Prostatomegaly  Questionable secondary small mass in the left lower quadrant mesentery  Workstation performed: [id number]     Xr Chest Portable    Result Date: 2/4/2019  Narrative: CHEST INDICATION:   sepsis  History of metastatic neuroendocrine tumor COMPARISON:  1/27/2019 EXAM PERFORMED/VIEWS:  XR CHEST PORTABLE FINDINGS: Cardiomediastinal silhouette appears unremarkable  The lungs are clear  No pneumothorax or pleural effusion  Osseous structures appear within normal limits for patient age  Impression: No acute cardiopulmonary disease   Workstation performed: [id number]     Xr Chest 2 Views    Result Date: 1/28/2019  Narrative: CHEST INDICATION: syncope  COMPARISON:  None EXAM PERFORMED/VIEWS:  XR CHEST PA & LATERAL FINDINGS: Cardiomediastinal silhouette is not enlarged  Coronary artery calcification is noted  No airspace consolidation, pneumothorax, pulmonary edema, or pleural effusion  Multilevel thoracic spondylosis in a pattern suggestive of diffuse idiopathic skeletal hyperostosis  Impression: No radiographic evidence of acute intrathoracic process  Workstation performed: [id number]     Ct Head Without Contrast    Result Date: 1/27/2019  Narrative: CT BRAIN - WITHOUT CONTRAST INDICATION:   syncope  COMPARISON:  None  TECHNIQUE:  CT examination of the brain was performed  In addition to axial images, coronal 2D reformatted images were created and submitted for interpretation  Radiation dose length product (DLP) for this visit:  1037 mGy-cm   This examination, like all CT scans performed in the [facility], was performed utilizing techniques to minimize radiation dose exposure, including the use of iterative reconstruction and automated exposure control  IMAGE QUALITY:  Diagnostic  FINDINGS: PARENCHYMA: Decreased attenuation is noted in periventricular and subcortical white matter demonstrating an appearance that is statistically most likely to represent mild microangiopathic change  There is a small chronic lacunar infarct within the left thalamus  There is prominent atherosclerotic calcification of the vertebral arteries and internal carotid arteries  No CT signs of acute infarction  No intracranial mass, mass effect or midline shift  No acute parenchymal hemorrhage  VENTRICLES AND EXTRA-AXIAL SPACES:  Normal for the patient's age  VISUALIZED ORBITS AND PARANASAL SINUSES:  Unremarkable  CALVARIUM AND EXTRACRANIAL SOFT TISSUES:  Normal      Impression: No acute intracranial abnormality    Chronic findings as mentioned Workstation performed: [id number]     Us Liver    Result Date: 1/24/2019  Narrative: LIMITED LIVER ULTRASOUND INDICATION:     Liver mets suspected, initial imaging after primary tumor detected liver masses for possible biopsy  COMPARISON:  Abdominal CT 1/23/2019 TECHNIQUE:   Real-time ultrasound of the liver was performed with a curvilinear transducer with both volumetric sweeps and still imaging techniques  FINDINGS: LIVER: Innumerable bilobar hepatic masses with the largest in the left hepatic lobe segment 2/3 measuring 2 6 x 2 7 x 2 9 cm  Largest mass in the right hepatic lobe measures 4 8 x 4 1 x 4 1 cm  Right hydronephrosis is again noted  Impression: 1  Limited study  2   Bilobar hepatic metastasis as above  Workstation performed: [id number]     Ir Image Guided Biopsy/aspiration Liver    Result Date: 1/25/2019  Narrative: Examination: Ultrasound-guided biopsy of the liver HISTORY: Mass at the root of the mesentery with calcifications  Multiple liver masses  Suspicious for carcinoid TECHNIQUE: The patient was brought to Radiology  Informed consent was obtained  The right costal margin was prepped and draped in usual sterile fashion  Timeout was performed  Lidocaine was administered as local anesthesia  Using ultrasound guidance a 17-gauge needle was advanced to the targeted lesion  An 18-gauge needle was placed coaxially, and core biopsy was performed  The tract was closed with D stat  FINDINGS: Large hyperechoic lesions  This is atypical for metastatic disease  The cores were read in color, more typical for liver than tumor  Impression: Technically successful ultrasound-guided biopsy This is then of the clinically relevant portion of this report  Subsequent information is for compliance, documentation, and coding purposes  In the process of informed consent, information was communicated, verbally, to the patient regarding the procedure    The patient was informed of; the name of the procedure, nature of the procedure, nature of the condition, risks, benefits, alternatives, and potential complications, as well as the consequences of not having the procedure  All the patient's questions were answered  Informed consent was signed  Chlorhexidine and alcohol was used for cleansing and sterile preparation of the skin  This was allowed to dry prior to the application of sterile draping  Timeout was performed, with all team members present, and in agreement  Confirmation of patient, procedure, laterally, allergies, and all needed equipment was performed  PROCEDURE: Ultrasound-guided biopsy of the above-named organ or abnormality Preprocedure diagnosis: Please see "history ", above Postprocedure diagnosis: Same Antibiotics: None Estimated blood loss: less than 5 mL Specimen: Core biopsy submitted in formalin Anesthesia: Local and monitored intravenous conscious sedation The patient was examined, and is in satisfactory condition for planned moderate sedation  Mallampati score: 2 Intravenous conscious sedation was provided  There was continuous monitoring of blood pressure, heart rate, respiratory rate, and oxygen saturation by an independent, trained observer  Conscious sedation time: 50 minutes Versed dose: 0 Milligrams Fentanyl dose: 100 Micrograms After the procedure, the patient was recovered, and return to their baseline status, and was deemed fit for discharge from IR  Workstation performed: [id number]       Labs and pertinent reports reviewed

## 2019-02-05 NOTE — ASSESSMENT & PLAN NOTE
· Patient with CT abdomen/pelvis showing bilateral mild hydroureteronephrosis which is unchanged from previous CT scan done a few weeks ago  · Modest improvement of creatinine  · Appreciate Nephrology and Urology consult

## 2019-02-05 NOTE — PROGRESS NOTES
Progress Note - Nephrology   Carina Reddy 77 y o  male MRN: [id number]  Unit/Bed#: ICU 01 Encounter: [id number]    A/P:  1  Acute renal failure sure what on top of chronic kidney disease               patient appears to be eating and drinking well at this time  Will discontinue IV fluids the patient's fraction excretion of sodium is greater than 2%  May reinstitute IV fluids if needed for supportive care  2  Chronic kidney disease stage 3a with baseline creatinine likely around 0 9 - 1 mg/dL  3  Probable diabetic nephropathy  4  Proteinuria currently sub nephrotic               follow-up back up in the outpatient setting, patient will need to have a monoclonal gammopathy ruled out  Patient should also be placed on ACE-inhibitor or angiotensin receptor blocker if renal function can tolerate  5  Neuroendocrine tumor               patient to be followed by Dr Carlitos Tinajero, patient may be seen while inpatient  6  Urinary retention              Patient's Haile catheter intact, we need to ensure is properly working  Urology was asked to evaluate the patient and will defer further care  7  Acute cystitis               Patient Ceftriaxone, follow-up culture  Unfortunately the patient's urinalysis noted moderate epithelial cells  Follow up reason for today's visit:  Acute renal failure    Acute cystitis without hematuria    Patient Active Problem List   Diagnosis    Diarrhea    Weakness generalized    Diabetes mellitus ([address] )    Hypertension    Hyperlipidemia    Cardiac disease    Acute renal failure superimposed on stage 3 chronic kidney disease (HCC)    Generalized abdominal mass    Hydroureter    Anemia    Vasovagal syncope    Accelerated hypertension    Liver mass    Neuroendocrine tumor    Acute cystitis without hematuria         Subjective:   No acute events overnight    Objective:     Vitals: Blood pressure 166/72, pulse 81, temperature 97 7 °F (36 5 °C), temperature source Temporal, resp   rate (!) 25, height 5' 8" (1 727 m), weight 68 5 kg (151 lb), SpO2 95 %  ,Body mass index is 22 96 kg/m²  Weight (last 2 days)     Date/Time   Weight    02/03/19 0905  68 5 (151)                Intake/Output Summary (Last 24 hours) at 02/05/19 1046  Last data filed at 02/05/19 0701   Gross per 24 hour   Intake          3978 68 ml   Output             3700 ml   Net           278 68 ml     I/O last 3 completed shifts: In: 4549 5 [P O :960; I V :3497 4; IV Piggyback:92 1]  Out: 4475 [Urine:4475]    Urethral Catheter Straight-tip 16 Fr   (Active)   Amt returned on insertion(mL) 5 mL 2/3/2019  3:21 PM   Site Assessment Patent 2/3/2019  3:21 PM   Collection Container Standard drainage bag 2/3/2019  3:21 PM   Securement Method Securing device (Describe) 2/3/2019  3:21 PM   Output (mL) 350 mL 2/5/2019  7:01 AM       Physical Exam: /72   Pulse 81   Temp 97 7 °F (36 5 °C) (Temporal)   Resp (!) 25   Ht 5' 8" (1 727 m)   Wt 68 5 kg (151 lb)   SpO2 95%   BMI 22 96 kg/m²     General Appearance:    Alert, cooperative, no distress, appears stated age   Head:    Normocephalic, without obvious abnormality, atraumatic   Eyes:    Conjunctiva/corneas clear   Ears:    Normal external ears   Nose:   Nares normal, septum midline, mucosa normal, no drainage    or sinus tenderness   Throat:   Lips, mucosa, and tongue normal; teeth and gums normal   Neck:   Supple   Back:     Symmetric, no curvature, ROM normal, no CVA tenderness   Lungs:     Clear to auscultation bilaterally, respirations unlabored   Chest wall:    No tenderness or deformity   Heart:    Regular rate and rhythm, S1 and S2 normal, no murmur, rub   or gallop   Abdomen:     Soft, non-tender, bowel sounds active   Extremities:   Extremities normal, atraumatic, no cyanosis or edema   Skin:   Skin color, texture, turgor normal, no rashes or lesions   Lymph nodes:   Cervical normal   Neurologic:   CNII-XII intact            Lab, Imaging and other studies: I have personally reviewed pertinent labs  CBC: Lab Results   Component Value Date    WBC 14 40 (H) 02/05/2019    HGB 7 6 (L) 02/05/2019    HCT 22 9 (L) 02/05/2019    MCV 92 02/05/2019     02/05/2019    MCH 30 4 02/05/2019    MCHC 33 2 02/05/2019    RDW 12 7 02/05/2019    MPV 10 0 02/05/2019     CMP: Lab Results   Component Value Date    K 3 9 02/05/2019     02/05/2019    CO2 22 02/05/2019    BUN 41 (H) 02/05/2019    CREATININE 2 22 (H) 02/05/2019    CALCIUM 8 5 (L) 02/05/2019    AST 20 02/05/2019    ALT 18 02/05/2019    ALKPHOS 102 02/05/2019    EGFR 27 02/05/2019         Results from last 7 days  Lab Units 02/05/19  0501 02/04/19  1933 02/04/19  1233  02/03/19  0910   POTASSIUM mmol/L 3 9 3 6 4 0  < > 4 3   CHLORIDE mmol/L 107 103 97*  < > 96*   CO2 mmol/L 22 23 23  < > 25   BUN mg/dL 41* 49* 61*  < > 67*   CREATININE mg/dL 2 22* 2 37* 2 77*  < > 3 06*   CALCIUM mg/dL 8 5* 8 5* 8 9  < > 9 2   ALK PHOS U/L 102  --   --   --  101   ALT U/L 18  --   --   --  17   AST U/L 20  --   --   --  17   < > = values in this interval not displayed  Phosphorus:   Lab Results   Component Value Date    PHOS 3 0 02/04/2019     Magnesium:   Lab Results   Component Value Date    MG 1 4 (L) 02/04/2019     Urinalysis: No results found for: [place], SPECGRAV, PHUR, LEUKOCYTESUR, NITRITE, PROTEINUA, GLUCOSEU, KETONESU, BILIRUBINUR, BLOODU  Ionized Calcium: No results found for: CAION  Coagulation: Lab Results   Component Value Date    INR 1 14 02/05/2019     Troponin: No results found for: TROPONINI  ABG: No results found for: PHART, WPO4REN, PO2ART, HRK9PUP, L1MHQQJP, BEART, SOURCE  Radiology review:     IMAGING  Procedure: Ct Abdomen Pelvis Wo Contrast    Result Date: 2/3/2019  Narrative: CT ABDOMEN AND PELVIS WITHOUT IV CONTRAST INDICATION:   Hydronephrosis abd pain, known abd mass  Newly diagnosed metastatic neuroendocrine tumor   COMPARISON:  CT abdomen and pelvis 1/23/2018 TECHNIQUE:  CT examination of the abdomen and pelvis was performed without intravenous contrast   Axial, sagittal, and coronal 2D reformatted images were created from the source data and submitted for interpretation  Radiation dose length product (DLP) for this visit:  350 5 mGy-cm   This examination, like all CT scans performed in the [facility], was performed utilizing techniques to minimize radiation dose exposure, including the use of iterative reconstruction and automated exposure control  Enteric contrast was administered  FINDINGS: ABDOMEN LOWER CHEST:  No clinically significant abnormality identified in the visualized lower chest  LIVER/BILIARY TREE:  Multiple hypoattenuating lesions within the liver are again seen in keeping with known metastatic disease  GALLBLADDER:  No calcified gallstones  No pericholecystic inflammatory change  SPLEEN:  Unremarkable  PANCREAS:  Unremarkable  ADRENAL GLANDS:  Unremarkable  KIDNEYS/URETERS:  Mild to moderate bilateral hydroureteronephrosis is not significantly changed  No calculi are identified  STOMACH AND BOWEL:  Unremarkable  APPENDIX:  No findings to suggest appendicitis  ABDOMINOPELVIC CAVITY:  Calcified soft tissue mass within the root of the mesentery in the right lower quadrant is again seen and most suspicious for carcinoid tumor  This measures approximately 2 7 x 3 5 cm  VESSELS:  Unremarkable for patient's age  PELVIS REPRODUCTIVE ORGANS:  Unremarkable for patient's age  URINARY BLADDER:  Bladder is partially decompressed by Haile catheter  Circumferential bladder wall thickening is again noted and out of proportion to underdistention  ABDOMINAL WALL/INGUINAL REGIONS:  Unremarkable  OSSEOUS STRUCTURES:  No acute fracture or destructive osseous lesion  Impression: 1  No significant change in appearance of calcified mesenteric mass, appearance suspicious for carcinoid tumor   2   No significant change in multiple hypoattenuating lesions within the liver, biopsy-proven metastatic neuroendocrine tumor  3   No significant change in bilateral mild to moderate hydroureteronephrosis  No calculi identified  4   Unchanged circumferential bladder wall thickening  Workstation performed: [id number]     Procedure: Xr Chest Portable    Result Date: 2/4/2019  Narrative: CHEST INDICATION:   sepsis  History of metastatic neuroendocrine tumor COMPARISON:  1/27/2019 EXAM PERFORMED/VIEWS:  XR CHEST PORTABLE FINDINGS: Cardiomediastinal silhouette appears unremarkable  The lungs are clear  No pneumothorax or pleural effusion  Osseous structures appear within normal limits for patient age  Impression: No acute cardiopulmonary disease   Workstation performed: [id number]       Current Facility-Administered Medications   Medication Dose Route Frequency    acetaminophen (TYLENOL) tablet 650 mg  650 mg Oral Q4H PRN    amLODIPine (NORVASC) tablet 5 mg  5 mg Oral Daily    cefTRIAXone (ROCEPHIN) IVPB (premix) 1,000 mg  1,000 mg Intravenous Q24H    enoxaparin (LOVENOX) subcutaneous injection 30 mg  30 mg Subcutaneous Daily    hydrALAZINE (APRESOLINE) injection 5 mg  5 mg Intravenous Q6H PRN    insulin detemir (LEVEMIR) subcutaneous injection 15 Units  15 Units Subcutaneous HS    insulin lispro (HumaLOG) 100 units/mL subcutaneous injection 1-5 Units  1-5 Units Subcutaneous HS    insulin lispro (HumaLOG) 100 units/mL subcutaneous injection 1-6 Units  1-6 Units Subcutaneous TID AC    insulin regular (HumuLIN R,NovoLIN R) 1 Units/mL in sodium chloride 0 9 % 100 mL infusion  0 1-30 Units/hr Intravenous Continuous    ondansetron (ZOFRAN) injection 4 mg  4 mg Intravenous Q6H PRN     Medications Discontinued During This Encounter   Medication Reason    sodium chloride 0 9 % infusion     sodium chloride 0 9 % infusion     sodium chloride 0 9 % infusion     insulin regular (HumuLIN R,NovoLIN R) 1 Units/mL in sodium chloride 0 9 % 100 mL infusion     dextrose 5 % and sodium chloride 0 9 % infusion     enoxaparin (LOVENOX) subcutaneous injection 40 mg     insulin glargine (LANTUS) subcutaneous injection 10 Units 0 1 mL Duplicate order    insulin glargine (LANTUS) subcutaneous injection 10 Units 0 1 mL     insulin lispro (HumaLOG) 100 units/mL subcutaneous injection 1-5 Units     insulin lispro (HumaLOG) 100 units/mL subcutaneous injection 1-5 Units     cefTRIAXone (ROCEPHIN) IVPB (premix) 1,000 mg     sodium chloride 0 9 % infusion     sodium chloride 0 9 % infusion     sodium chloride 0 9 % infusion     dextrose 5 % and sodium chloride 0 45 % infusion     insulin lispro (HumaLOG) 100 units/mL subcutaneous injection 1-5 Units     sodium chloride 0 9 % infusion        Sabi Rodrigez DO

## 2019-02-05 NOTE — SOCIAL WORK
Pt discussed during care coordination rounds  Pt not yet medically stable for discharge  He will need another 1-2 days per Dr Terra Montoya  [facility] did not have a bed for STR  Pt accepted to [facility] for STR  Pt and SO aware of acceptance to [facility] and in agreement  Liat at [facility] admissions aware and will reach out to family regarding paperwork  Pt will likely require w/c [facility] C [facility]lvarez transport upon discharge  CM to follow

## 2019-02-05 NOTE — NURSING NOTE
Pt noted to be diaphoretic  Checked poc glucose  It was 49  Pt drank 4 oz orange juice  poc glucose up to 59  Gave pt jorge l crackers and peanut butter and 4 oz oj  Will cont to monitor

## 2019-02-05 NOTE — PROGRESS NOTES
Progress Note - Amy Hunter 1940, 77 y o  male MRN: [id number]    Unit/Bed#: ICU 01 Encounter: [id number]    Primary Care Provider: Jesse Tillman DO   Date and time admitted to hospital: 2/3/2019  8:54 AM        Neuroendocrine tumor   Assessment & Plan    · Patient with abdominal mesenteric mass with lesions in the liver  · Patient had biopsy of liver lesion on 01/24/19  · 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  · Patient was unaware of results, these results were relayed by Dr Mark Duran to patient yesterday  · Dr Gerry Beckford made aware over the phone regarding biopsy results    Oncology will be seeing patient while in-house to answer their questions  · Patient is not having any diarrhea at this time however if patient does develop diarrhea that is uncontrolled and non infectious could be related to patient's tumor and may benefit from octreotide     Anemia   Assessment & Plan    · Likely anemia of chronic disease, modestly lower today  · Will monitor H&H  · Transfuse for hemoglobin less than 7 5     Acute renal failure superimposed on stage 3 chronic kidney disease ([address] )   Assessment & Plan    · Patient with CT abdomen/pelvis showing bilateral mild hydroureteronephrosis which is unchanged from previous CT scan done a few weeks ago  · Modest improvement of creatinine  · Appreciate Nephrology and Urology consult     Hypertension   Assessment & Plan    · BP well controlled     Diabetes mellitus ([address] )   Assessment & Plan    · Glucose improving  · Patient was titrated off of insulin drip yesterday  · Currently on subcutaneous insulin  · Keep close eye on glucose     * Acute cystitis without hematuria   Assessment & Plan    · Patient with Haile catheter placed a few weeks ago due to obstruction  · Haile replaced  · Continue IV antibiotics  · Follow up cultures         VTE Pharmacologic Prophylaxis: Pharmacologic: Heparin    Patient Centered Rounds: I have performed bedside rounds with nursing staff today  Discussions with Specialists or Other Care Team Provider: oncology  Education and Discussions with Family / Patient: patient and son over the phone    Time Spent for Care: 20 minutes  More than 50% of total time spent on counseling and coordination of care as described above  Current Length of Stay: 2 day(s)    Current Patient Status: Inpatient   Certification Statement: The patient will continue to require additional inpatient hospital stay due to michelle, hyperglycemia    Discharge Plan: pending hospital course    Code Status: Level 1 - Full Code    Subjective:   Patient seen examined  Noted to be hypoglycemic this morning    Objective:     Vitals:   Temp (24hrs), Av 9 °F (37 2 °C), Min:97 7 °F (36 5 °C), Max:100 °F (37 8 °C)    Temp:  [97 7 °F (36 5 °C)-100 °F (37 8 °C)] 97 7 °F (36 5 °C)  HR:  [] 81  Resp:  [18-26] 25  BP: (112-170)/(56-90) 170/72  SpO2:  [73 %-97 %] 95 %  Body mass index is 22 96 kg/m²  Input and Output Summary (last 24 hours): Intake/Output Summary (Last 24 hours) at 19 0842  Last data filed at 19 0701   Gross per 24 hour   Intake          4538 68 ml   Output             3700 ml   Net           838 68 ml       Physical Exam:     Physical Exam   Constitutional: He is oriented to person, place, and time  He appears well-developed and well-nourished  HENT:   Head: Normocephalic and atraumatic  Eyes: Pupils are equal, round, and reactive to light  EOM are normal    Neck: Normal range of motion  Neck supple  No JVD present  Cardiovascular: Normal rate, regular rhythm and normal heart sounds  Pulmonary/Chest: Effort normal and breath sounds normal    Abdominal: Soft  He exhibits no distension  There is no tenderness  Genitourinary:   Genitourinary Comments: Haile in place   Musculoskeletal: Normal range of motion  Neurological: He is alert and oriented to person, place, and time  Skin: Skin is warm  No erythema     Psychiatric: He has a normal mood and affect  His behavior is normal  Thought content normal    Nursing note and vitals reviewed  Additional Data:     Labs:      Results from last 7 days  Lab Units 02/05/19  0501  02/03/19  0910   WBC Thousand/uL 14 40*  < > 16 80*   HEMOGLOBIN g/dL 7 6*  < > 9 5*   HEMATOCRIT % 22 9*  < > 28 5*   PLATELETS Thousands/uL 155  < > 168   NEUTROS PCT %  --   --  87*   LYMPHS PCT %  --   --  2*   MONOS PCT %  --   --  10   EOS PCT %  --   --  0   < > = values in this interval not displayed  Results from last 7 days  Lab Units 02/05/19  0501   POTASSIUM mmol/L 3 9   CHLORIDE mmol/L 107   CO2 mmol/L 22   BUN mg/dL 41*   CREATININE mg/dL 2 22*   CALCIUM mg/dL 8 5*   ALK PHOS U/L 102   ALT U/L 18   AST U/L 20       Results from last 7 days  Lab Units 02/05/19  0502   INR  1 14       Results from last 7 days  Lab Units 02/05/19  0501 02/05/19  0347 02/05/19  0332 02/05/19  0309 02/04/19  2109 02/04/19  2016 02/04/19  1919 02/04/19  1804 02/04/19  1707 02/04/19  1611 02/04/19  1500 02/04/19  1400   POC GLUCOSE mg/dl 171* 71 57* 49* 141* 149* 138 183* 128 247* 384* 436*           * I Have Reviewed All Lab Data Listed Above  * Additional Pertinent Lab Tests Reviewed: [facility]  [facility]    Imaging:  Imaging Reports Reviewed Today Include: n/a    Recent Cultures (last 7 days):       Results from last 7 days  Lab Units 02/03/19  1227 02/03/19  0918 02/03/19  0915 02/03/19  0910   BLOOD CULTURE   --  No Growth at 24 hrs   --  No Growth at 24 hrs  URINE CULTURE  Culture results to follow    --   --   --    INFLUENZA B PCR   --   --  Not Detected  --    RSV PCR   --   --  Not Detected  --        Last 24 Hours Medication List:     Current Facility-Administered Medications:  acetaminophen 650 mg Oral Q4H PRN Trev Contreras PA-C    amLODIPine 5 mg Oral Daily Erna Habermann, MD    cefTRIAXone 1,000 mg Intravenous Q24H Naty Villaseñor MD    enoxaparin 30 mg Subcutaneous Daily Franklin Ahumada MD    hydrALAZINE 5 mg Intravenous Q6H PRN Franklin Ahumada MD    insulin detemir 15 Units Subcutaneous HS Franklin Ahumada MD    insulin lispro 1-5 Units Subcutaneous HS Rhonda Sharif MD    insulin lispro 1-6 Units Subcutaneous TID Cee Nash MD    insulin regular (HumuLIN R,NovoLIN R) infusion 0 1-30 Units/hr Intravenous Continuous Rhonda Sharif MD Last Rate: Stopped (02/04/19 2219)   ondansetron 4 mg Intravenous Q6H PRN Agustín Smith PA-C         Today, Patient Was Seen By: Rhonda Sharif MD    ** Please Note: Dictation voice to text software may have been used in the creation of this document   **

## 2019-02-05 NOTE — PHYSICAL THERAPY NOTE
02/05/19 0922   Pain Assessment   Pain Assessment 0-10   Pain Score 5   Pain Type Acute pain   Pain Location Penis  (from catheter)   Pain Descriptors Aching   Pain Frequency Constant/continuous   Pain Onset Ongoing   Clinical Progression Not changed   Patient's Stated Pain Goal No pain   Hospital Pain Intervention(s) Repositioned; Ambulation/increased activity; Distraction   Restrictions/Precautions   Weight Bearing Precautions Per Order No   Other Precautions Contact/isolation;Droplet precautions; Telemetry;O2;Fall Risk;Multiple lines;Hard of hearing   General   Family/Caregiver Present Yes   Cognition   Overall Cognitive Status WFL   Arousal/Participation Alert; Cooperative   Attention Within functional limits   Orientation Level Oriented X4   Memory Within functional limits   Following Commands Follows one step commands with increased time or repetition   Comments pt agreed to PT treatment   Subjective   Subjective "I'll do what I can but I don't know how much I can do  I have pain from this catheter 5/10  It aches constantly "   Bed Mobility   Supine to Sit 3  Moderate assistance   Additional items Assist x 2;Bedrails;HOB elevated; Increased time required;Verbal cues;LE management   Additional Comments pt sat at EOB 2-3 mins prior to standing   Transfers   Sit to Stand 4  Minimal assistance   Additional items Assist x 2;Bedrails; Increased time required;Verbal cues   Stand to Sit 4  Minimal assistance   Additional items Assist x 2;Armrests; Increased time required;Verbal cues   Stand pivot 4  Minimal assistance   Additional items Assist x 2;Armrests; Increased time required;Verbal cues   Ambulation/Elevation   Gait pattern Improper Weight shift;Narrow AMANDO; Forward Flexion;Decreased foot clearance; Short stride; Step to; Inconsistent margarita   Gait Assistance 4  Minimal assist   Additional items Assist x 2;Verbal cues; Tactile cues   Assistive Device Rolling walker   Distance 3 feet bed to chair   Stair Management Assistance Not tested   Balance   Static Sitting Fair +   Dynamic Sitting Fair   Static Standing Fair -   Dynamic Standing Fair -   Ambulatory Poor +   Endurance Deficit   Endurance Deficit Yes   Activity Tolerance   Activity Tolerance Patient limited by fatigue;Patient limited by pain   Exercises   Hip Flexion Sitting;20 reps;AROM; Bilateral   Knee AROM Long Arc Quad Sitting;20 reps;AROM; Bilateral   Ankle Pumps Sitting;20 reps;AROM; Bilateral   Assessment   Prognosis Fair   Problem List Decreased strength;Decreased endurance; Impaired balance;Decreased mobility; Decreased coordination; Impaired judgement;Decreased safety awareness   Assessment Pt seen for PT treatment session this date with interventions consisting of therapeutic activity consisting of training: supine<>sit transfers, sit<>stand transfers, static sitting tolerance at EOB for 2-3 minutes w/ B UE support and stand pivot transfer bed to chair with RW min x 2  Pt agreeable to PT treatment session upon arrival, pt found supine in bed w/ HOB elevated, in no apparent distress  In comparison to previous session, pt with improvements in activity tolerance  Post session: all needs in reach in bedside chair, all needs in reach, SCDs active & wife present  Continue to recommend STR at time of d/c in order to maximize pt's functional independence and safety w/ mobility  Pt continues to be functioning below baseline level, and remains limited 2* factors listed above and including decreased strength, endurance & safe functional mobility  PT will continue to see pt while here in order to address the deficits listed above and provide interventions consistent w/ POC in effort to achieve STGs  Goals   Patient Goals feel stronger   Treatment Day 2   Plan   Treatment/Interventions Functional transfer training;LE strengthening/ROM; Therapeutic exercise; Endurance training;Patient/family training;Equipment eval/education; Bed mobility;Gait training;Spoke to nursing   Progress Improving as expected   PT Frequency 5x/wk   Recommendation   Recommendation Short-term skilled PT   Equipment Recommended Walker  (pt has own)   PT - OK to Discharge Yes  (when med cleared to STR)   Additional Comments pt OOB in bedside chair, all needs in reach, SCDs active, wife present post session   Regulo Reeves, PTA

## 2019-02-06 LAB
ALBUMIN SERPL BCP-MCNC: 2.8 G/DL (ref 3.5–5.7)
ALP SERPL-CCNC: 115 U/L (ref 55–165)
ALT SERPL W P-5'-P-CCNC: 25 U/L (ref 7–52)
ANION GAP SERPL CALCULATED.3IONS-SCNC: 9 MMOL/L (ref 4–13)
AST SERPL W P-5'-P-CCNC: 19 U/L (ref 13–39)
BASOPHILS # BLD AUTO: 0 THOUSANDS/ΜL (ref 0–0.1)
BASOPHILS NFR BLD AUTO: 1 % (ref 0–2)
BILIRUB SERPL-MCNC: 0.3 MG/DL (ref 0.2–1)
BLD SMEAR INTERP: NORMAL
BUN SERPL-MCNC: 44 MG/DL (ref 7–25)
CALCIUM SERPL-MCNC: 8.4 MG/DL (ref 8.6–10.5)
CHLORIDE SERPL-SCNC: 104 MMOL/L (ref 98–107)
CO2 SERPL-SCNC: 22 MMOL/L (ref 21–31)
CREAT SERPL-MCNC: 2.28 MG/DL (ref 0.7–1.3)
CRP SERPL QL: >90 MG/L
DAT POLY-SP REAG RBC QL: NEGATIVE
EOSINOPHIL # BLD AUTO: 0.1 THOUSAND/ΜL (ref 0–0.61)
EOSINOPHIL NFR BLD AUTO: 1 % (ref 0–5)
ERYTHROCYTE [DISTWIDTH] IN BLOOD BY AUTOMATED COUNT: 13 % (ref 11.5–14.5)
ERYTHROCYTE [SEDIMENTATION RATE] IN BLOOD: >120 MM/HOUR (ref 0–20)
FERRITIN SERPL-MCNC: 789 NG/ML (ref 8–388)
FOLATE SERPL-MCNC: 15.7 NG/ML (ref 3.1–17.5)
GFR SERPL CREATININE-BSD FRML MDRD: 26 ML/MIN/1.73SQ M
GLUCOSE SERPL-MCNC: 225 MG/DL (ref 65–140)
GLUCOSE SERPL-MCNC: 250 MG/DL (ref 65–140)
GLUCOSE SERPL-MCNC: 251 MG/DL (ref 65–140)
GLUCOSE SERPL-MCNC: 256 MG/DL (ref 65–99)
GLUCOSE SERPL-MCNC: 257 MG/DL (ref 65–140)
GLUCOSE SERPL-MCNC: 269 MG/DL (ref 65–140)
HCT VFR BLD AUTO: 22.7 % (ref 36.5–49.3)
HGB BLD-MCNC: 7.7 G/DL (ref 14–18)
IGA SERPL-MCNC: 173 MG/DL (ref 70–400)
IGG SERPL-MCNC: 573 MG/DL (ref 700–1600)
IGM SERPL-MCNC: 24 MG/DL (ref 40–230)
INR PPP: 1.2 (ref 0.9–1.5)
IRON SATN MFR SERPL: 37 %
IRON SERPL-MCNC: 61 UG/DL (ref 65–175)
LDH SERPL-CCNC: 159 U/L (ref 84–246)
LYMPHOCYTES # BLD AUTO: 0.8 THOUSANDS/ΜL (ref 0.6–4.47)
LYMPHOCYTES NFR BLD AUTO: 9 % (ref 21–51)
MCH RBC QN AUTO: 30.9 PG (ref 26–34)
MCHC RBC AUTO-ENTMCNC: 34.1 G/DL (ref 31–37)
MCV RBC AUTO: 91 FL (ref 81–99)
MONOCYTES # BLD AUTO: 1 THOUSAND/ΜL (ref 0.17–1.22)
MONOCYTES NFR BLD AUTO: 10 % (ref 2–12)
NEUTROPHILS # BLD AUTO: 7.8 THOUSANDS/ΜL (ref 1.4–6.5)
NEUTS SEG NFR BLD AUTO: 79 % (ref 42–75)
NRBC BLD AUTO-RTO: 0 /100 WBCS
PLATELET # BLD AUTO: 165 THOUSANDS/UL (ref 149–390)
PMV BLD AUTO: 9.3 FL (ref 8.6–11.7)
POTASSIUM SERPL-SCNC: 4.3 MMOL/L (ref 3.5–5.5)
PROT SERPL-MCNC: 5.2 G/DL (ref 6.4–8.9)
PROTHROMBIN TIME: 14 SECONDS (ref 10.2–13)
RBC # BLD AUTO: 2.5 MILLION/UL (ref 4.3–5.9)
RETICS # CALC: 2.29 % (ref 0.37–1.87)
SODIUM SERPL-SCNC: 135 MMOL/L (ref 134–143)
TIBC SERPL-MCNC: 163 UG/DL (ref 250–450)
VIT B12 SERPL-MCNC: 1758 PG/ML (ref 100–900)
WBC # BLD AUTO: 9.8 THOUSAND/UL (ref 4.8–10.8)

## 2019-02-06 PROCEDURE — 83550 IRON BINDING TEST: CPT | Performed by: NURSE PRACTITIONER

## 2019-02-06 PROCEDURE — 86316 IMMUNOASSAY TUMOR OTHER: CPT | Performed by: NURSE PRACTITIONER

## 2019-02-06 PROCEDURE — 82668 ASSAY OF ERYTHROPOIETIN: CPT | Performed by: NURSE PRACTITIONER

## 2019-02-06 PROCEDURE — 85025 COMPLETE CBC W/AUTO DIFF WBC: CPT | Performed by: NURSE PRACTITIONER

## 2019-02-06 PROCEDURE — 99232 SBSQ HOSP IP/OBS MODERATE 35: CPT | Performed by: INTERNAL MEDICINE

## 2019-02-06 PROCEDURE — 82746 ASSAY OF FOLIC ACID SERUM: CPT | Performed by: NURSE PRACTITIONER

## 2019-02-06 PROCEDURE — 97110 THERAPEUTIC EXERCISES: CPT

## 2019-02-06 PROCEDURE — 86880 COOMBS TEST DIRECT: CPT | Performed by: NURSE PRACTITIONER

## 2019-02-06 PROCEDURE — 83010 ASSAY OF HAPTOGLOBIN QUANT: CPT | Performed by: NURSE PRACTITIONER

## 2019-02-06 PROCEDURE — 82784 ASSAY IGA/IGD/IGG/IGM EACH: CPT | Performed by: NURSE PRACTITIONER

## 2019-02-06 PROCEDURE — 82728 ASSAY OF FERRITIN: CPT | Performed by: NURSE PRACTITIONER

## 2019-02-06 PROCEDURE — 82948 REAGENT STRIP/BLOOD GLUCOSE: CPT

## 2019-02-06 PROCEDURE — 83540 ASSAY OF IRON: CPT | Performed by: NURSE PRACTITIONER

## 2019-02-06 PROCEDURE — 85652 RBC SED RATE AUTOMATED: CPT | Performed by: NURSE PRACTITIONER

## 2019-02-06 PROCEDURE — 83615 LACTATE (LD) (LDH) ENZYME: CPT | Performed by: NURSE PRACTITIONER

## 2019-02-06 PROCEDURE — 85610 PROTHROMBIN TIME: CPT | Performed by: INTERNAL MEDICINE

## 2019-02-06 PROCEDURE — 86140 C-REACTIVE PROTEIN: CPT | Performed by: NURSE PRACTITIONER

## 2019-02-06 PROCEDURE — 85045 AUTOMATED RETICULOCYTE COUNT: CPT | Performed by: NURSE PRACTITIONER

## 2019-02-06 PROCEDURE — 82607 VITAMIN B-12: CPT | Performed by: NURSE PRACTITIONER

## 2019-02-06 PROCEDURE — 80053 COMPREHEN METABOLIC PANEL: CPT | Performed by: INTERNAL MEDICINE

## 2019-02-06 RX ORDER — PANTOPRAZOLE SODIUM 40 MG/1
40 TABLET, DELAYED RELEASE ORAL
Status: DISCONTINUED | OUTPATIENT
Start: 2019-02-06 | End: 2019-02-07 | Stop reason: HOSPADM

## 2019-02-06 RX ORDER — SIMETHICONE 80 MG
80 TABLET,CHEWABLE ORAL EVERY 6 HOURS PRN
Status: DISCONTINUED | OUTPATIENT
Start: 2019-02-06 | End: 2019-02-07 | Stop reason: HOSPADM

## 2019-02-06 RX ORDER — TAMSULOSIN HYDROCHLORIDE 0.4 MG/1
0.4 CAPSULE ORAL
Status: DISCONTINUED | OUTPATIENT
Start: 2019-02-06 | End: 2019-02-07 | Stop reason: HOSPADM

## 2019-02-06 RX ADMIN — INSULIN DETEMIR 15 UNITS: 100 INJECTION, SOLUTION SUBCUTANEOUS at 21:01

## 2019-02-06 RX ADMIN — PANTOPRAZOLE SODIUM 40 MG: 40 TABLET, DELAYED RELEASE ORAL at 08:04

## 2019-02-06 RX ADMIN — INSULIN LISPRO 3 UNITS: 100 INJECTION, SOLUTION INTRAVENOUS; SUBCUTANEOUS at 17:08

## 2019-02-06 RX ADMIN — TAMSULOSIN HYDROCHLORIDE 0.4 MG: 0.4 CAPSULE ORAL at 20:55

## 2019-02-06 RX ADMIN — INSULIN LISPRO 6 UNITS: 100 INJECTION, SOLUTION INTRAVENOUS; SUBCUTANEOUS at 11:13

## 2019-02-06 RX ADMIN — SIMETHICONE CHEW TAB 80 MG 80 MG: 80 TABLET ORAL at 13:54

## 2019-02-06 RX ADMIN — ENOXAPARIN SODIUM 30 MG: 40 INJECTION SUBCUTANEOUS at 08:05

## 2019-02-06 RX ADMIN — INSULIN LISPRO 6 UNITS: 100 INJECTION, SOLUTION INTRAVENOUS; SUBCUTANEOUS at 07:46

## 2019-02-06 RX ADMIN — INSULIN LISPRO 6 UNITS: 100 INJECTION, SOLUTION INTRAVENOUS; SUBCUTANEOUS at 17:09

## 2019-02-06 RX ADMIN — INSULIN LISPRO 2 UNITS: 100 INJECTION, SOLUTION INTRAVENOUS; SUBCUTANEOUS at 07:44

## 2019-02-06 RX ADMIN — CEFTRIAXONE 1000 MG: 1 INJECTION, SOLUTION INTRAVENOUS at 07:43

## 2019-02-06 RX ADMIN — AMLODIPINE BESYLATE 5 MG: 5 TABLET ORAL at 08:04

## 2019-02-06 RX ADMIN — INSULIN LISPRO 3 UNITS: 100 INJECTION, SOLUTION INTRAVENOUS; SUBCUTANEOUS at 11:12

## 2019-02-06 RX ADMIN — INSULIN LISPRO 2 UNITS: 100 INJECTION, SOLUTION INTRAVENOUS; SUBCUTANEOUS at 21:02

## 2019-02-06 RX ADMIN — SIMETHICONE CHEW TAB 80 MG 80 MG: 80 TABLET ORAL at 20:55

## 2019-02-06 NOTE — PROGRESS NOTES
Progress Note - Yamilet Kiser 77 y o  male MRN: [id number]    Unit/Bed#: ICU 01 Encounter: [id number]      Assessment:  Urinary retention with indwelling Haile catheter  Stable mild to moderate bilateral hydronephrosis  Renal function has slowly improved and stabilized, as creatinine has trended down to 2 28 today  Plan:  Recommend continuation of Haile catheter  He is scheduled for transfer to a skilled Nursing Rehabilitation Unit  He is asking about voiding trial   I would recommend starting tamsulosin and consider a voiding trial after he is through with physical therapy and his mobility has improved  Discussed with the hospitalist     Subjective:   No complaints of upper abdominal or flank pain  Tolerating the Haile well  Objective:     Vitals: Blood pressure (!) 198/87, pulse 87, temperature 97 6 °F (36 4 °C), temperature source Tympanic, resp  rate (!) 52, height 5' 8" (1 727 m), weight 68 5 kg (151 lb), SpO2 100 %  ,Body mass index is 22 96 kg/m²  Intake/Output Summary (Last 24 hours) at 02/06/19 1817  Last data filed at 02/06/19 1701   Gross per 24 hour   Intake           630 67 ml   Output             3950 ml   Net         -3319 33 ml       Physical Exam: Male genitalia: normal   Haile catheter draining well with clear yellow urine  Invasive Devices     Peripheral Intravenous Line            Peripheral IV 02/03/19 Right Antecubital 3 days          Drain            Urethral Catheter Straight-tip 16 Fr  3 days                Lab, Imaging and other studies: I have personally reviewed pertinent reports      VTE Pharmacologic Prophylaxis: Sequential compression device (Venodyne)   VTE Mechanical Prophylaxis: sequential compression device

## 2019-02-06 NOTE — PROGRESS NOTES
Progress Note - Brianna Cuevas 1940, 77 y o  male MRN: [id number]    Unit/Bed#: ICU 01 Encounter: [id number]    Primary Care Provider: Sangeeta Torrez DO   Date and time admitted to hospital: 2/3/2019  8:54 AM        Neuroendocrine tumor   Assessment & Plan    · Patient with abdominal mesenteric mass with lesions in the liver  · Patient had biopsy of liver lesion on 01/24/19  · 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  · Appreciate Oncology input  · Follow-up in 2-3 weeks as outpatient     Anemia   Assessment & Plan    · Likely anemia of chronic disease, appears stable  · Will monitor H&H  · Transfuse for hemoglobin less than 7 5     Acute renal failure superimposed on stage 3 chronic kidney disease ([address] )   Assessment & Plan    · Patient with CT abdomen/pelvis showing bilateral mild hydroureteronephrosis which is unchanged from previous CT scan done a few weeks ago  · Modest improvement of creatinine  · Appreciate Nephrology and Urology consult     Hypertension   Assessment & Plan    · BP well controlled     Diabetes mellitus ([facility] [address] )   Assessment & Plan    · Glucose improving  · Patient was titrated off of insulin drip  · Currently on subcutaneous insulin  · Keep close eye on glucose and titrate insulin as necessary  · Given patient's renal function, patient will not be able to go home on his normal regimen of metformin, will need insulin on discharge     * Acute cystitis without hematuria   Assessment & Plan    · Patient with Haile catheter placed a few weeks ago due to obstruction  · Haile replaced  · Continue IV antibiotics  · Cultures demonstrate relatively pansensitive Staph aureus  · Will discharge on p o  Antibiotics       VTE Pharmacologic Prophylaxis: Pharmacologic: Heparin    Patient Centered Rounds: I have performed bedside rounds with nursing staff today      Discussions with Specialists or Other Care Team Provider: nephrology and oncology  Education and Discussions with Family / Patient: patient    Time Spent for Care: 20 minutes  More than 50% of total time spent on counseling and coordination of care as described above  Current Length of Stay: 3 day(s)    Current Patient Status: Inpatient   Certification Statement: The patient will continue to require additional inpatient hospital stay due to [facility], neuroendocrine tumor    Discharge Plan: likely home tomorrow    Code Status: Level 1 - Full Code    Subjective:   Patient seen examined  No acute events were noted  Feels much better today    Objective:     Vitals:   Temp (24hrs), Av 1 °F (32 8 °C), Min:48 2 °F (9 °C), Max:99 °F (37 2 °C)    Temp:  [48 2 °F (9 °C)-99 °F (37 2 °C)] 98 °F (36 7 °C)  HR:  [] 82  Resp:  [14-32] 18  BP: (119-179)/() 165/77  SpO2:  [80 %-99 %] 99 %  Body mass index is 22 96 kg/m²  Input and Output Summary (last 24 hours): Intake/Output Summary (Last 24 hours) at 19 0803  Last data filed at 19 0757   Gross per 24 hour   Intake          1759 42 ml   Output             3400 ml   Net         -1640 58 ml       Physical Exam:     Physical Exam   Constitutional: He is oriented to person, place, and time  He appears well-developed and well-nourished  HENT:   Head: Normocephalic and atraumatic  Poor dentition   Eyes: Pupils are equal, round, and reactive to light  EOM are normal    Neck: Normal range of motion  Neck supple  No JVD present  Cardiovascular: Normal rate, regular rhythm and normal heart sounds  Pulmonary/Chest: Effort normal and breath sounds normal  No respiratory distress  Abdominal: Soft  Bowel sounds are normal    Musculoskeletal: Normal range of motion  He exhibits no edema  Neurological: He is alert and oriented to person, place, and time  Skin: Skin is warm  No rash noted  No erythema  Psychiatric: He has a normal mood and affect  His behavior is normal  Thought content normal    Nursing note and vitals reviewed        Additional Data: Labs:      Results from last 7 days  Lab Units 02/06/19  0505   WBC Thousand/uL 9 80   HEMOGLOBIN g/dL 7 7*   HEMATOCRIT % 22 7*   PLATELETS Thousands/uL 165   NEUTROS PCT % 79*   LYMPHS PCT % 9*   MONOS PCT % 10   EOS PCT % 1       Results from last 7 days  Lab Units 02/06/19  0505   POTASSIUM mmol/L 4 3   CHLORIDE mmol/L 104   CO2 mmol/L 22   BUN mg/dL 44*   CREATININE mg/dL 2 28*   CALCIUM mg/dL 8 4*   ALK PHOS U/L 115   ALT U/L 25   AST U/L 19       Results from last 7 days  Lab Units 02/06/19  0504   INR  1 20       Results from last 7 days  Lab Units 02/06/19  0636 02/06/19  0456 02/05/19  2128 02/05/19  1639 02/05/19  1122 02/05/19  0501 02/05/19  0347 02/05/19  0332 02/05/19  0309 02/04/19  2109 02/04/19 2016 02/04/19  1919   POC GLUCOSE mg/dl 225* 251* 326* 359* 373* 171* 71 57* 49* 141* 149* 138           * I Have Reviewed All Lab Data Listed Above  * Additional Pertinent Lab Tests Reviewed: Laura [facility] Admission  Reviewed    Imaging:  Imaging Reports Reviewed Today Include: n/a    Recent Cultures (last 7 days):       Results from last 7 days  Lab Units 02/03/19  1227 02/03/19  0918 02/03/19  0915 02/03/19  0910   BLOOD CULTURE   --  No Growth at 48 hrs  --  No Growth at 48 hrs     URINE CULTURE  >100,000 cfu/ml Staphylococcus aureus*  6265-5522 cfu/ml Staphylococcus coagulase negative*  --   --   --    INFLUENZA B PCR   --   --  Not Detected  --    RSV PCR   --   --  Not Detected  --        Last 24 Hours Medication List:     Current Facility-Administered Medications:  acetaminophen 650 mg Oral Q4H PRN Melonie Velasquez PA-C    amLODIPine 5 mg Oral Daily Pilar Bowles MD    cefTRIAXone 1,000 mg Intravenous Q24H Risa Barrera MD Last Rate: 1,000 mg (02/06/19 0743)   enoxaparin 30 mg Subcutaneous Daily Pilar Bowles MD    hydrALAZINE 5 mg Intravenous Q6H PRN Pilar Bowles MD    insulin detemir 15 Units Subcutaneous HS Risa Barrera MD    insulin lispro 1-5 Units Subcutaneous HS Lendon Aase, MD    insulin lispro 1-6 Units Subcutaneous TID Delbra Schwab, MD    insulin lispro 6 Units Subcutaneous TID With Meals Lendon Aase, MD    ondansetron 4 mg Intravenous Q6H PRN Corona Lofton PA-C    pantoprazole 40 mg Oral Early Morning Vargas Calamity, DO    simethicone 80 mg Oral Q6H PRN Vargas Calamity, DO         Today, Patient Was Seen By: Lendon Aase, MD    ** Please Note: Dictation voice to text software may have been used in the creation of this document   **

## 2019-02-06 NOTE — PROGRESS NOTES
Progress Note - Nephrology   Brandie Dietz 77 y o  male MRN: [id number]  Unit/Bed#: ICU 01 Encounter: [id number]    A/P:  1  Acute renal failure sure what on top of chronic kidney disease               creatinine unchanged last 24 hours, fraction excretion of sodium is greater than 2% which either represents continue recovery the patient's underlying acute kidney injury versus acute tubular necrosis  Continue monitor patient's blood work  2  Chronic kidney disease stage 3a with baseline creatinine likely around 0 9 - 1 mg/dL  3  Probable diabetic nephropathy  4  Proteinuria currently sub nephrotic               follow-up back up in the outpatient setting, patient will need to have a monoclonal gammopathy ruled out  Patient should also be placed on ACE-inhibitor or angiotensin receptor blocker if renal function can tolerate  5  Neuroendocrine tumor               to have further testing, somatostatin analog to potentially provide in the outpatient setting to assist with diarrheal controlled  6  Urinary retention              Patient's Haile catheter intact, we need to ensure is properly working  Urology was asked to evaluate the patient and will defer further care  7  Acute cystitis               Patient urine culture came back with Staphylococcus aureus, appears to be pan susceptible with the exception of ampicillin        Follow up reason for today's visit:  Acute renal failure    Acute cystitis without hematuria    Patient Active Problem List   Diagnosis    Diarrhea    Weakness generalized    Diabetes mellitus ([address] )    Hypertension    Hyperlipidemia    Cardiac disease    Acute renal failure superimposed on stage 3 chronic kidney disease (HCC)    Generalized abdominal mass    Hydroureter    Anemia    Vasovagal syncope    Accelerated hypertension    Liver mass    Neuroendocrine tumor    Acute cystitis without hematuria         Subjective:   No acute events overnight    Objective:     Vitals: Blood pressure 165/77, pulse 82, temperature 97 9 °F (36 6 °C), temperature source Temporal, resp  rate 18, height 5' 8" (1 727 m), weight 68 5 kg (151 lb), SpO2 99 %  ,Body mass index is 22 96 kg/m²  Weight (last 2 days)     None            Intake/Output Summary (Last 24 hours) at 02/06/19 0720  Last data filed at 02/06/19 0509   Gross per 24 hour   Intake          1630 42 ml   Output             2850 ml   Net         -1219 58 ml     I/O last 3 completed shifts: In: 3942 4 [P O :1560; I V :2238 7; IV Piggyback:143 8]  Out: 5100 [Urine:5100]    Urethral Catheter Straight-tip 16 Fr   (Active)   Amt returned on insertion(mL) 5 mL 2/3/2019  3:21 PM   Site Assessment Patent 2/3/2019  3:21 PM   Collection Container Standard drainage bag 2/3/2019  3:21 PM   Securement Method Securing device (Describe) 2/3/2019  3:21 PM   Output (mL) 350 mL 2/5/2019  7:01 AM       Physical Exam: /77 (BP Location: Left arm)   Pulse 82   Temp 97 9 °F (36 6 °C) (Temporal)   Resp 18   Ht 5' 8" (1 727 m)   Wt 68 5 kg (151 lb)   SpO2 99%   BMI 22 96 kg/m²     General Appearance:    Alert, cooperative, no distress, appears stated age   Head:    Normocephalic, without obvious abnormality, atraumatic   Eyes:    Conjunctiva/corneas clear   Ears:    Normal external ears   Nose:   Nares normal, septum midline, mucosa normal, no drainage    or sinus tenderness   Throat:   Lips, mucosa, and tongue normal; teeth and gums normal   Neck:   Supple   Back:     Symmetric, no curvature, ROM normal, no CVA tenderness   Lungs:     Clear to auscultation bilaterally, respirations unlabored   Chest wall:    No tenderness or deformity   Heart:    Regular rate and rhythm, S1 and S2 normal, no murmur, rub   or gallop   Abdomen:     Soft, non-tender, bowel sounds active   Extremities:   Extremities normal, atraumatic, no cyanosis or edema   Skin:   Skin color, texture, turgor normal, no rashes or lesions   Lymph nodes:   Cervical normal   Neurologic: CNII-XII intact            Lab, Imaging and other studies: I have personally reviewed pertinent labs  CBC:   Lab Results   Component Value Date    WBC 9 80 02/06/2019    HGB 7 7 (L) 02/06/2019    HCT 22 7 (L) 02/06/2019    MCV 91 02/06/2019     02/06/2019    MCH 30 9 02/06/2019    MCHC 34 1 02/06/2019    RDW 13 0 02/06/2019    MPV 9 3 02/06/2019    NRBC 0 02/06/2019     CMP:   Lab Results   Component Value Date    K 4 3 02/06/2019     02/06/2019    CO2 22 02/06/2019    BUN 44 (H) 02/06/2019    CREATININE 2 28 (H) 02/06/2019    CALCIUM 8 4 (L) 02/06/2019    AST 19 02/06/2019    ALT 25 02/06/2019    ALKPHOS 115 02/06/2019    EGFR 26 02/06/2019         Results from last 7 days  Lab Units 02/06/19  0505 02/05/19  0501 02/04/19  1933  02/03/19  0910   POTASSIUM mmol/L 4 3 3 9 3 6  < > 4 3   CHLORIDE mmol/L 104 107 103  < > 96*   CO2 mmol/L 22 22 23  < > 25   BUN mg/dL 44* 41* 49*  < > 67*   CREATININE mg/dL 2 28* 2 22* 2 37*  < > 3 06*   CALCIUM mg/dL 8 4* 8 5* 8 5*  < > 9 2   ALK PHOS U/L 115 102  --   --  101   ALT U/L 25 18  --   --  17   AST U/L 19 20  --   --  17   < > = values in this interval not displayed  Phosphorus:   No results found for: PHOS  Magnesium:   No results found for: MG  Urinalysis: No results found for: Kaila Comber, SPECGRAV, PHUR, LEUKOCYTESUR, NITRITE, PROTEINUA, GLUCOSEU, KETONESU, BILIRUBINUR, BLOODU  Ionized Calcium: No results found for: CAION  Coagulation:   Lab Results   Component Value Date    INR 1 20 02/06/2019     Troponin: No results found for: TROPONINI  ABG: No results found for: PHART, MJW7GEI, PO2ART, UXW4IMV, G5KJSJXQ, BEART, SOURCE  Radiology review:     IMAGING  Procedure: Ct Abdomen Pelvis Wo Contrast    Result Date: 2/3/2019  Narrative: CT ABDOMEN AND PELVIS WITHOUT IV CONTRAST INDICATION:   Hydronephrosis abd pain, known abd mass  Newly diagnosed metastatic neuroendocrine tumor   COMPARISON:  CT abdomen and pelvis 1/23/2018 TECHNIQUE:  CT examination of the abdomen and pelvis was performed without intravenous contrast   Axial, sagittal, and coronal 2D reformatted images were created from the source data and submitted for interpretation  Radiation dose length product (DLP) for this visit:  350 5 mGy-cm   This examination, like all CT scans performed in the [facility], was performed utilizing techniques to minimize radiation dose exposure, including the use of iterative reconstruction and automated exposure control  Enteric contrast was administered  FINDINGS: ABDOMEN LOWER CHEST:  No clinically significant abnormality identified in the visualized lower chest  LIVER/BILIARY TREE:  Multiple hypoattenuating lesions within the liver are again seen in keeping with known metastatic disease  GALLBLADDER:  No calcified gallstones  No pericholecystic inflammatory change  SPLEEN:  Unremarkable  PANCREAS:  Unremarkable  ADRENAL GLANDS:  Unremarkable  KIDNEYS/URETERS:  Mild to moderate bilateral hydroureteronephrosis is not significantly changed  No calculi are identified  STOMACH AND BOWEL:  Unremarkable  APPENDIX:  No findings to suggest appendicitis  ABDOMINOPELVIC CAVITY:  Calcified soft tissue mass within the root of the mesentery in the right lower quadrant is again seen and most suspicious for carcinoid tumor  This measures approximately 2 7 x 3 5 cm  VESSELS:  Unremarkable for patient's age  PELVIS REPRODUCTIVE ORGANS:  Unremarkable for patient's age  URINARY BLADDER:  Bladder is partially decompressed by Haile catheter  Circumferential bladder wall thickening is again noted and out of proportion to underdistention  ABDOMINAL WALL/INGUINAL REGIONS:  Unremarkable  OSSEOUS STRUCTURES:  No acute fracture or destructive osseous lesion  Impression: 1  No significant change in appearance of calcified mesenteric mass, appearance suspicious for carcinoid tumor   2   No significant change in multiple hypoattenuating lesions within the liver, biopsy-proven metastatic neuroendocrine tumor  3   No significant change in bilateral mild to moderate hydroureteronephrosis  No calculi identified  4   Unchanged circumferential bladder wall thickening  Workstation performed: [id number]     Procedure: Xr Chest Portable    Result Date: 2/4/2019  Narrative: CHEST INDICATION:   sepsis  History of metastatic neuroendocrine tumor COMPARISON:  1/27/2019 EXAM PERFORMED/VIEWS:  XR CHEST PORTABLE FINDINGS: Cardiomediastinal silhouette appears unremarkable  The lungs are clear  No pneumothorax or pleural effusion  Osseous structures appear within normal limits for patient age  Impression: No acute cardiopulmonary disease   Workstation performed: [id number]       Current Facility-Administered Medications   Medication Dose Route Frequency    acetaminophen (TYLENOL) tablet 650 mg  650 mg Oral Q4H PRN    amLODIPine (NORVASC) tablet 5 mg  5 mg Oral Daily    cefTRIAXone (ROCEPHIN) IVPB (premix) 1,000 mg  1,000 mg Intravenous Q24H    enoxaparin (LOVENOX) subcutaneous injection 30 mg  30 mg Subcutaneous Daily    hydrALAZINE (APRESOLINE) injection 5 mg  5 mg Intravenous Q6H PRN    insulin detemir (LEVEMIR) subcutaneous injection 10 Units  10 Units Subcutaneous HS    insulin lispro (HumaLOG) 100 units/mL subcutaneous injection 1-5 Units  1-5 Units Subcutaneous HS    insulin lispro (HumaLOG) 100 units/mL subcutaneous injection 1-6 Units  1-6 Units Subcutaneous TID AC    insulin lispro (HumaLOG) 100 units/mL subcutaneous injection 6 Units  6 Units Subcutaneous TID With Meals    insulin regular (HumuLIN R,NovoLIN R) 1 Units/mL in sodium chloride 0 9 % 100 mL infusion  0 1-30 Units/hr Intravenous Continuous    ondansetron (ZOFRAN) injection 4 mg  4 mg Intravenous Q6H PRN     Medications Discontinued During This Encounter   Medication Reason    sodium chloride 0 9 % infusion     sodium chloride 0 9 % infusion     sodium chloride 0 9 % infusion     insulin regular (HumuLIN R,NovoLIN R) 1 Units/mL in sodium chloride 0 9 % 100 mL infusion     dextrose 5 % and sodium chloride 0 9 % infusion     enoxaparin (LOVENOX) subcutaneous injection 40 mg     insulin glargine (LANTUS) subcutaneous injection 10 Units 0 1 mL Duplicate order    insulin glargine (LANTUS) subcutaneous injection 10 Units 0 1 mL     insulin lispro (HumaLOG) 100 units/mL subcutaneous injection 1-5 Units     insulin lispro (HumaLOG) 100 units/mL subcutaneous injection 1-5 Units     cefTRIAXone (ROCEPHIN) IVPB (premix) 1,000 mg     sodium chloride 0 9 % infusion     sodium chloride 0 9 % infusion     sodium chloride 0 9 % infusion     dextrose 5 % and sodium chloride 0 45 % infusion     insulin lispro (HumaLOG) 100 units/mL subcutaneous injection 1-5 Units     sodium chloride 0 9 % infusion     insulin detemir (LEVEMIR) subcutaneous injection 15 Units     insulin detemir (LEVEMIR) subcutaneous injection 10 Units     insulin detemir (LEVEMIR) subcutaneous injection 15 Units        Juanita Paz DO

## 2019-02-06 NOTE — NURSING NOTE
@ 3009 when entered patient's room to assess and draw am labs, patient noted to be diaphoretic  Hs snack was provided r/t drop in blood sugar previous night  Blood sugar was checked at this time and noted to be 251 mg/dL  Patient AO x3; temp 97 9  Patient offering no complaints  Offered to bathe patient, which he refused

## 2019-02-06 NOTE — PLAN OF CARE
Problem: OCCUPATIONAL THERAPY ADULT  Goal: Performs self-care activities at highest level of function for planned discharge setting  See evaluation for individualized goals  Treatment Interventions: Energy conservation, Activityengagement, ADL retraining, Functional transfer training, UE strengthening/ROM, Endurance training, Patient/family training          See flowsheet documentation for full assessment, interventions and recommendations  Outcome: Progressing  Limitation: Decreased ADL status, Decreased UE strength, Decreased Safe judgement during ADL, Decreased endurance, Decreased self-care trans  Prognosis: Good  Assessment: Patient participated in Skilled OT session this date with interventions consisting of therapeutic exercise to: increase functional use of BUEs, increase BUE muscle strength and build activity tolerance for self-care  Patient agreeable to OT treatment session, upon arrival patient was found sitting in bed  Significant other was present and is familiar with some of the rehab process from own experience - she was encouraging of patient  Patient requiring verbal cues for correct technique and occasional rest periods  Patient continues to be functioning below baseline level, occupational performance remains limited secondary to factors listed above and increased risk for falls and injury  From OT standpoint, recommendation at time of d/c would be Short Term Rehab  Patient to benefit from continued Occupational Therapy treatment while in the hospital to address deficits as defined above and maximize level of functional independence with ADLs and functional mobility        OT Discharge Recommendation: Short Term Rehab  OT - OK to Discharge: No  TAWNYA Grider/IZZY

## 2019-02-06 NOTE — OCCUPATIONAL THERAPY NOTE
02/06/19 1136   Restrictions/Precautions   Weight Bearing Precautions Per Order No   Other Precautions Contact/isolation; Fall Risk   General   Family/Caregiver Present significant other/female   Pain Assessment   Pain Assessment No/denies pain   Right Upper Extremity- Strength   R Shoulder Flexion; Horizontal ABduction; Other (Comment)  (pro/re-traction)   R Elbow (supin/pron-ation)   R Weight/Reps/Sets 1 pound - 20 reps shoulders, 30 reps elbows   Left Upper Extremity-Strength   L Shoulder Flexion; Horizontal ABduction; Other (Comment)  (pro/re-traction)   L Elbow Elbow flexion;Elbow extension  (supin/pron-ation)   L Weights/Reps/Sets Same as noted RUE above   Coordination   Gross Motor WFL   Cognition   Overall Cognitive Status WFL   Arousal/Participation Alert; Cooperative   Assessment   Assessment Patient participated in Skilled OT session this date with interventions consisting of therapeutic exercise to: increase functional use of BUEs, increase BUE muscle strength and build activity tolerance for self-care  Patient agreeable to OT treatment session, upon arrival patient was found sitting in bed  Significant other was present and is familiar with some of the rehab process from own experience - she was encouraging of patient  Patient requiring verbal cues for correct technique and occasional rest periods  Patient continues to be functioning below baseline level, occupational performance remains limited secondary to factors listed above and increased risk for falls and injury  From OT standpoint, recommendation at time of d/c would be Short Term Rehab  Patient to benefit from continued Occupational Therapy treatment while in the hospital to address deficits as defined above and maximize level of functional independence with ADLs and functional mobility  Plan   Treatment Interventions UE strengthening/ROM; Patient/family training; Activityengagement   Goal Expiration Date 02/14/19   Treatment Day 1   Marielena Mercedes Rivera

## 2019-02-06 NOTE — ASSESSMENT & PLAN NOTE
· Likely anemia of chronic disease, appears stable  · Will monitor H&H  · Transfuse for hemoglobin less than 7 5

## 2019-02-06 NOTE — PHYSICAL THERAPY NOTE
Physical Therapy Cancellation Note  Attempted to see pt for PT treatment at 10:09  He deferred treatment "until dinnertime & I'll get up to eat " RN Gina Bustillo notified of this      Regulo Almaraz PTA

## 2019-02-06 NOTE — ASSESSMENT & PLAN NOTE
· Patient with Haile catheter placed a few weeks ago due to obstruction  · Haile replaced  · Continue IV antibiotics  · Cultures demonstrate relatively pansensitive Staph aureus  · Will discharge on p o   Antibiotics

## 2019-02-06 NOTE — SOCIAL WORK
Discussed the POC with the rounding team   As per MD,  Pt will most likeley be discharged tomorrow to the SNF  Received a TC from Dima Cox at [facility] who states the [facility] number for [place] will remain the same [phone]  Left a message for Nida Arias at [facility]

## 2019-02-06 NOTE — PHYSICAL THERAPY NOTE
Physical Therapy Cancellation Note  Attempted to see pt again for PT session & pt refused stating he just had OT & is tired & it's still too early to get up for lunch   RN Manish Tavarez notified of refusal     Margoth Love, PTA

## 2019-02-06 NOTE — ASSESSMENT & PLAN NOTE
· Glucose improving  · Patient was titrated off of insulin drip  · Currently on subcutaneous insulin  · Keep close eye on glucose and titrate insulin as necessary  · Given patient's renal function, patient will not be able to go home on his normal regimen of metformin, will need insulin on discharge

## 2019-02-07 ENCOUNTER — TELEPHONE (OUTPATIENT)
Dept: HEMATOLOGY ONCOLOGY | Facility: CLINIC | Age: 79
End: 2019-02-07

## 2019-02-07 VITALS
HEART RATE: 100 BPM | TEMPERATURE: 97.7 F | OXYGEN SATURATION: 96 % | HEIGHT: 68 IN | DIASTOLIC BLOOD PRESSURE: 78 MMHG | SYSTOLIC BLOOD PRESSURE: 144 MMHG | WEIGHT: 151 LBS | RESPIRATION RATE: 25 BRPM | BODY MASS INDEX: 22.88 KG/M2

## 2019-02-07 PROBLEM — N17.9 ACUTE RENAL FAILURE SUPERIMPOSED ON STAGE 3 CHRONIC KIDNEY DISEASE (HCC): Chronic | Status: RESOLVED | Noted: 2019-01-23 | Resolved: 2019-02-07

## 2019-02-07 PROBLEM — N18.30 ACUTE RENAL FAILURE SUPERIMPOSED ON STAGE 3 CHRONIC KIDNEY DISEASE (HCC): Chronic | Status: RESOLVED | Noted: 2019-01-23 | Resolved: 2019-02-07

## 2019-02-07 PROBLEM — N30.00 ACUTE CYSTITIS WITHOUT HEMATURIA: Status: RESOLVED | Noted: 2019-02-03 | Resolved: 2019-02-07

## 2019-02-07 LAB
ANION GAP SERPL CALCULATED.3IONS-SCNC: 10 MMOL/L (ref 4–13)
BUN SERPL-MCNC: 42 MG/DL (ref 7–25)
CALCIUM SERPL-MCNC: 9.1 MG/DL (ref 8.6–10.5)
CHLORIDE SERPL-SCNC: 101 MMOL/L (ref 98–107)
CO2 SERPL-SCNC: 25 MMOL/L (ref 21–31)
CREAT SERPL-MCNC: 1.98 MG/DL (ref 0.7–1.3)
EPO SERPL-ACNC: 12.1 MIU/ML (ref 2.6–18.5)
GFR SERPL CREATININE-BSD FRML MDRD: 31 ML/MIN/1.73SQ M
GLUCOSE SERPL-MCNC: 182 MG/DL (ref 65–140)
GLUCOSE SERPL-MCNC: 182 MG/DL (ref 65–99)
GLUCOSE SERPL-MCNC: 300 MG/DL (ref 65–140)
HAPTOGLOB SERPL-MCNC: 509 MG/DL (ref 34–200)
POTASSIUM SERPL-SCNC: 4.1 MMOL/L (ref 3.5–5.5)
SODIUM SERPL-SCNC: 136 MMOL/L (ref 134–143)

## 2019-02-07 PROCEDURE — 80048 BASIC METABOLIC PNL TOTAL CA: CPT | Performed by: INTERNAL MEDICINE

## 2019-02-07 PROCEDURE — 99239 HOSP IP/OBS DSCHRG MGMT >30: CPT | Performed by: INTERNAL MEDICINE

## 2019-02-07 PROCEDURE — 83497 ASSAY OF 5-HIAA: CPT | Performed by: NURSE PRACTITIONER

## 2019-02-07 PROCEDURE — 82948 REAGENT STRIP/BLOOD GLUCOSE: CPT

## 2019-02-07 RX ORDER — PANTOPRAZOLE SODIUM 40 MG/1
40 TABLET, DELAYED RELEASE ORAL
Refills: 0
Start: 2019-02-08 | End: 2020-04-01 | Stop reason: SDUPTHER

## 2019-02-07 RX ORDER — TAMSULOSIN HYDROCHLORIDE 0.4 MG/1
0.4 CAPSULE ORAL
Refills: 0
Start: 2019-02-07 | End: 2020-04-09 | Stop reason: SDUPTHER

## 2019-02-07 RX ORDER — SIMETHICONE 80 MG
80 TABLET,CHEWABLE ORAL EVERY 6 HOURS PRN
Qty: 30 TABLET | Refills: 0
Start: 2019-02-07 | End: 2019-07-07 | Stop reason: HOSPADM

## 2019-02-07 RX ADMIN — PANTOPRAZOLE SODIUM 40 MG: 40 TABLET, DELAYED RELEASE ORAL at 06:41

## 2019-02-07 RX ADMIN — INSULIN LISPRO 6 UNITS: 100 INJECTION, SOLUTION INTRAVENOUS; SUBCUTANEOUS at 11:24

## 2019-02-07 RX ADMIN — INSULIN LISPRO 1 UNITS: 100 INJECTION, SOLUTION INTRAVENOUS; SUBCUTANEOUS at 09:09

## 2019-02-07 RX ADMIN — AMLODIPINE BESYLATE 5 MG: 5 TABLET ORAL at 09:09

## 2019-02-07 RX ADMIN — INSULIN LISPRO 6 UNITS: 100 INJECTION, SOLUTION INTRAVENOUS; SUBCUTANEOUS at 09:09

## 2019-02-07 RX ADMIN — INSULIN LISPRO 4 UNITS: 100 INJECTION, SOLUTION INTRAVENOUS; SUBCUTANEOUS at 11:23

## 2019-02-07 RX ADMIN — ENOXAPARIN SODIUM 30 MG: 40 INJECTION SUBCUTANEOUS at 09:08

## 2019-02-07 NOTE — ASSESSMENT & PLAN NOTE
· Patient with CT abdomen/pelvis showing bilateral mild hydroureteronephrosis which is unchanged from previous CT scan done a few weeks ago  · improvement of creatinine  · Appreciate Nephrology and Urology consult  · Follow-up labs in 3 days

## 2019-02-07 NOTE — SOCIAL WORK
Pt discussed in care coordination rounds  Pt for discharge today and has been accepted at [facility]  Per Araceli Kemp at [facility] pts daughter has signed all paperwork and pt can come today  CM met with pt and SO at bedside  Pt agreeable to going to [place]  Pt would like SO to transport him  Per PT transport by private vehicle would be a safe transport plan for pt  Pt aware and SO will transport  Araceli Kemp at [facility] aware of same and reports staff will help pt into the facility when he arrives  Pt nurse provided with number for nursing report  CM faxed AVS as requested  Pt to leave for [place] after eating lunch

## 2019-02-07 NOTE — PROGRESS NOTES
Progress Note - Nephrology   Janes Jose 77 y o  male MRN: [id number]  Unit/Bed#: ICU 01 Encounter: [id number]    A/P:  1  Acute renal failure sure what on top of chronic kidney disease              renal function improved this morning, continue with Haile catheter  Patient along on IV fluids and appears to be eating and drinking well on his own  2  Chronic kidney disease stage 3a with baseline creatinine likely around 0 9 - 1 mg/dL  3  Probable diabetic nephropathy  4  Proteinuria currently sub nephrotic               follow-up back up in the outpatient setting, patient will need to have a monoclonal gammopathy ruled out  Patient should also be placed on ACE-inhibitor or angiotensin receptor blocker if renal function can tolerate   ----------------  5  Neuroendocrine tumor               continue follow-up and treatment as indicated by Hematology/Oncology  6  Urinary retention              Patient's Haile catheter intact  7  Acute cystitis               Patient urine culture came back with Staphylococcus aureus, appears to be pan susceptible with the exception of ampicillin  Follow up reason for today's visit:  Acute renal failure    Acute cystitis without hematuria    Patient Active Problem List   Diagnosis    Diarrhea    Weakness generalized    Diabetes mellitus ([facility] [address] )    Hypertension    Hyperlipidemia    Cardiac disease    Generalized abdominal mass    Hydroureter    Anemia    Vasovagal syncope    Accelerated hypertension    Liver mass    Neuroendocrine tumor         Subjective:   No acute events overnight    Objective:     Vitals: Blood pressure 122/64, pulse 89, temperature 97 8 °F (36 6 °C), temperature source Temporal, resp  rate 17, height 5' 8" (1 727 m), weight 68 5 kg (151 lb), SpO2 92 %  ,Body mass index is 22 96 kg/m²      Weight (last 2 days)     None            Intake/Output Summary (Last 24 hours) at 02/07/19 1010  Last data filed at 02/07/19 0427   Gross per 24 hour   Intake 540 ml   Output             2690 ml   Net            -2150 ml     I/O last 3 completed shifts: In: 840 7 [P O :789; IV Piggyback:51 7]  Out: 8121 [Urine:5590]    Urethral Catheter Straight-tip 16 Fr  (Active)   Amt returned on insertion(mL) 5 mL 2/3/2019  3:21 PM   Site Assessment Patent 2/3/2019  3:21 PM   Collection Container Standard drainage bag 2/3/2019  3:21 PM   Securement Method Securing device (Describe) 2/3/2019  3:21 PM   Output (mL) 350 mL 2/5/2019  7:01 AM       Physical Exam: /64 (BP Location: Left arm)   Pulse 89   Temp 97 8 °F (36 6 °C) (Temporal)   Resp 17   Ht 5' 8" (1 727 m)   Wt 68 5 kg (151 lb)   SpO2 92%   BMI 22 96 kg/m²     General Appearance:    Alert, cooperative, no distress, appears stated age   Head:    Normocephalic, without obvious abnormality, atraumatic   Eyes:    Conjunctiva/corneas clear   Ears:    Normal external ears   Nose:   Nares normal, septum midline, mucosa normal, no drainage    or sinus tenderness   Throat:   Lips, mucosa, and tongue normal; teeth and gums normal   Neck:   Supple   Back:     Symmetric, no curvature, ROM normal, no CVA tenderness   Lungs:     Clear to auscultation bilaterally, respirations unlabored   Chest wall:    No tenderness or deformity   Heart:    Regular rate and rhythm, S1 and S2 normal, no murmur, rub   or gallop   Abdomen:     Soft, non-tender, bowel sounds active   Extremities:   Extremities normal, atraumatic, no cyanosis or edema   Skin:   Skin color, texture, turgor normal, no rashes or lesions   Lymph nodes:   Cervical normal   Neurologic:   CNII-XII intact            Lab, Imaging and other studies: I have personally reviewed pertinent labs    CBC:   No results found for: WBC, HGB, HCT, MCV, PLT, ADJUSTEDWBC, MCH, MCHC, RDW, MPV, NRBC  CMP:   Lab Results   Component Value Date    K 4 1 02/07/2019     02/07/2019    CO2 25 02/07/2019    BUN 42 (H) 02/07/2019    CREATININE 1 98 (H) 02/07/2019    CALCIUM 9 1 02/07/2019    EGFR 31 02/07/2019         Results from last 7 days  Lab Units 02/07/19  0435 02/06/19  0505 02/05/19  0501  02/03/19  0910   POTASSIUM mmol/L 4 1 4 3 3 9  < > 4 3   CHLORIDE mmol/L 101 104 107  < > 96*   CO2 mmol/L 25 22 22  < > 25   BUN mg/dL 42* 44* 41*  < > 67*   CREATININE mg/dL 1 98* 2 28* 2 22*  < > 3 06*   CALCIUM mg/dL 9 1 8 4* 8 5*  < > 9 2   ALK PHOS U/L  --  115 102  --  101   ALT U/L  --  25 18  --  17   AST U/L  --  19 20  --  17   < > = values in this interval not displayed  Phosphorus:   No results found for: PHOS  Magnesium:   No results found for: MG  Urinalysis: No results found for: Shey Porteous, SPECGRAV, PHUR, LEUKOCYTESUR, NITRITE, PROTEINUA, GLUCOSEU, KETONESU, BILIRUBINUR, BLOODU  Ionized Calcium: No results found for: CAION  Coagulation:   No results found for: PT, INR, APTT  Troponin: No results found for: TROPONINI  ABG: No results found for: PHART, LAP4PXR, PO2ART, RDE7UCT, H1CMSAMF, BEART, SOURCE  Radiology review:     IMAGING  Procedure: Ct Abdomen Pelvis Wo Contrast    Result Date: 2/3/2019  Narrative: CT ABDOMEN AND PELVIS WITHOUT IV CONTRAST INDICATION:   Hydronephrosis abd pain, known abd mass  Newly diagnosed metastatic neuroendocrine tumor  COMPARISON:  CT abdomen and pelvis 1/23/2018 TECHNIQUE:  CT examination of the abdomen and pelvis was performed without intravenous contrast   Axial, sagittal, and coronal 2D reformatted images were created from the source data and submitted for interpretation  Radiation dose length product (DLP) for this visit:  350 5 mGy-cm   This examination, like all CT scans performed in the [facility], was performed utilizing techniques to minimize radiation dose exposure, including the use of iterative reconstruction and automated exposure control  Enteric contrast was administered   FINDINGS: ABDOMEN LOWER CHEST:  No clinically significant abnormality identified in the visualized lower chest  LIVER/BILIARY TREE:  Multiple hypoattenuating lesions within the liver are again seen in keeping with known metastatic disease  GALLBLADDER:  No calcified gallstones  No pericholecystic inflammatory change  SPLEEN:  Unremarkable  PANCREAS:  Unremarkable  ADRENAL GLANDS:  Unremarkable  KIDNEYS/URETERS:  Mild to moderate bilateral hydroureteronephrosis is not significantly changed  No calculi are identified  STOMACH AND BOWEL:  Unremarkable  APPENDIX:  No findings to suggest appendicitis  ABDOMINOPELVIC CAVITY:  Calcified soft tissue mass within the root of the mesentery in the right lower quadrant is again seen and most suspicious for carcinoid tumor  This measures approximately 2 7 x 3 5 cm  VESSELS:  Unremarkable for patient's age  PELVIS REPRODUCTIVE ORGANS:  Unremarkable for patient's age  URINARY BLADDER:  Bladder is partially decompressed by Haile catheter  Circumferential bladder wall thickening is again noted and out of proportion to underdistention  ABDOMINAL WALL/INGUINAL REGIONS:  Unremarkable  OSSEOUS STRUCTURES:  No acute fracture or destructive osseous lesion  Impression: 1  No significant change in appearance of calcified mesenteric mass, appearance suspicious for carcinoid tumor  2   No significant change in multiple hypoattenuating lesions within the liver, biopsy-proven metastatic neuroendocrine tumor  3   No significant change in bilateral mild to moderate hydroureteronephrosis  No calculi identified  4   Unchanged circumferential bladder wall thickening  Workstation performed: [id number]     Procedure: Xr Chest Portable    Result Date: 2/4/2019  Narrative: CHEST INDICATION:   sepsis  History of metastatic neuroendocrine tumor COMPARISON:  1/27/2019 EXAM PERFORMED/VIEWS:  XR CHEST PORTABLE FINDINGS: Cardiomediastinal silhouette appears unremarkable  The lungs are clear  No pneumothorax or pleural effusion  Osseous structures appear within normal limits for patient age       Impression: No acute cardiopulmonary disease   Workstation performed: [id number]       Current Facility-Administered Medications   Medication Dose Route Frequency    acetaminophen (TYLENOL) tablet 650 mg  650 mg Oral Q4H PRN    amLODIPine (NORVASC) tablet 5 mg  5 mg Oral Daily    enoxaparin (LOVENOX) subcutaneous injection 30 mg  30 mg Subcutaneous Daily    hydrALAZINE (APRESOLINE) injection 5 mg  5 mg Intravenous Q6H PRN    insulin detemir (LEVEMIR) subcutaneous injection 15 Units  15 Units Subcutaneous HS    insulin lispro (HumaLOG) 100 units/mL subcutaneous injection 1-5 Units  1-5 Units Subcutaneous HS    insulin lispro (HumaLOG) 100 units/mL subcutaneous injection 1-6 Units  1-6 Units Subcutaneous TID AC    insulin lispro (HumaLOG) 100 units/mL subcutaneous injection 6 Units  6 Units Subcutaneous TID With Meals    ondansetron (ZOFRAN) injection 4 mg  4 mg Intravenous Q6H PRN    pantoprazole (PROTONIX) EC tablet 40 mg  40 mg Oral Early Morning    simethicone (MYLICON) chewable tablet 80 mg  80 mg Oral Q6H PRN    tamsulosin (FLOMAX) capsule 0 4 mg  0 4 mg Oral Daily With Dinner     Medications Discontinued During This Encounter   Medication Reason    sodium chloride 0 9 % infusion     sodium chloride 0 9 % infusion     sodium chloride 0 9 % infusion     insulin regular (HumuLIN R,NovoLIN R) 1 Units/mL in sodium chloride 0 9 % 100 mL infusion     dextrose 5 % and sodium chloride 0 9 % infusion     enoxaparin (LOVENOX) subcutaneous injection 40 mg     insulin glargine (LANTUS) subcutaneous injection 10 Units 0 1 mL Duplicate order    insulin glargine (LANTUS) subcutaneous injection 10 Units 0 1 mL     insulin lispro (HumaLOG) 100 units/mL subcutaneous injection 1-5 Units     insulin lispro (HumaLOG) 100 units/mL subcutaneous injection 1-5 Units     cefTRIAXone (ROCEPHIN) IVPB (premix) 1,000 mg     sodium chloride 0 9 % infusion     sodium chloride 0 9 % infusion     sodium chloride 0 9 % infusion     dextrose 5 % and sodium chloride 0 45 % infusion     insulin lispro (HumaLOG) 100 units/mL subcutaneous injection 1-5 Units     sodium chloride 0 9 % infusion     insulin detemir (LEVEMIR) subcutaneous injection 15 Units     insulin detemir (LEVEMIR) subcutaneous injection 10 Units     insulin detemir (LEVEMIR) subcutaneous injection 15 Units     insulin detemir (LEVEMIR) subcutaneous injection 10 Units     insulin regular (HumuLIN R,NovoLIN R) 1 Units/mL in sodium chloride 0 9 % 100 mL infusion     cefTRIAXone (ROCEPHIN) IVPB (premix) 1,000 mg        Yomaira Nieto DO

## 2019-02-07 NOTE — ASSESSMENT & PLAN NOTE
· Given patient's CKD, patient can no longer take metformin  · Will discharge patient on insulin, 15 units of Levemir q h s   Along with mealtime insulin  · Recommend outpatient follow-up with PCP

## 2019-02-07 NOTE — NURSING NOTE
Sacral wounds reassessed and new foam dressing applied  Verbal report called to KALYN Cabrera at [facility]  Pt transported to [facility] via w/c with belongings  Pt transported by private vehicle with family members to [facility]  , Lit Mehta, made aware of same

## 2019-02-07 NOTE — DISCHARGE SUMMARY
Discharge- Keren Lopes 1940, 77 y o  male MRN: [id number]    Unit/Bed#: ICU 01 Encounter: [id number]    Primary Care Provider: Jeferson Sidhu DO   Date and time admitted to hospital: 2/3/2019  8:54 AM        Neuroendocrine tumor   Assessment & Plan    · Patient with abdominal mesenteric mass with lesions in the liver  · Patient had biopsy of liver lesion on 01/24/19  · 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  · Appreciate Oncology input  · Follow-up in 2-3 weeks as outpatient     Anemia   Assessment & Plan    · Likely anemia of chronic disease, appears stable  · Check CBC within 3 days of discharge     Hypertension   Assessment & Plan    · BP well controlled     Diabetes mellitus ([facility] [address] )   Assessment & Plan    · Given patient's CKD, patient can no longer take metformin  · Will discharge patient on insulin, 15 units of Levemir q h s   Along with mealtime insulin  · Recommend outpatient follow-up with PCP     Acute renal failure superimposed on stage 3 chronic kidney disease (HCC)resolved as of 2/7/2019   Assessment & Plan    · Patient with CT abdomen/pelvis showing bilateral mild hydroureteronephrosis which is unchanged from previous CT scan done a few weeks ago  · improvement of creatinine  · Appreciate Nephrology and Urology consult  · Follow-up labs in 3 days     * Acute cystitis without hematuriaresolved as of 2/7/2019   Assessment & Plan    · Patient with Haile catheter placed a few weeks ago due to obstruction  · Haile replaced  · Completed course of antibiotics  · Urology recommends keeping Haile in an attempt a voiding trial in 1 week and to start Flomax  · Follow up with Urology as an outpatient         Discharging Physician / Practitioner: Eboni James MD  PCP: Jeferson Sidhu DO  Admission Date:   Admission Orders     Ordered        02/03/19 1144  Inpatient Admission (expected length of stay for this patient Order details is greater than two midnights)  Once Discharge Date: 02/07/19    Disposition:      Short Term Rehab or SNF at [place]    For Discharges to [address]:   · Not Applicable to this Patient - Not Applicable to this Patient    Reason for Admission: weakness    Discharge Diagnoses:     Please see assessment and plan section above for further details regarding discharge diagnoses  Resolved Problems  Date Reviewed: 2/7/2019          Resolved    Acute renal failure superimposed on stage 3 chronic kidney disease ([facility] [address] ) 2/7/2019     Resolved by  Luz Hood MD    * (Principal)Acute cystitis without hematuria 2/7/2019     Resolved by  Luz Hood MD          Consultations During Hospital Stay:  · Nephrology  · Urology  · oncology    Procedures Performed:   · Haile replacement     Medication Adjustments and Discharge Medications:  · Summary of Medication Adjustments made as a result of this hospitalization: discontinue metformin and glipizide, started on insulin  · Medication Dosing Tapers - Please refer to Discharge Medication List for details on any medication dosing tapers (if applicable to patient)  · Medications being temporarily held (include recommended restart time): metformin and glipizide  · Discharge Medication List: See after visit summary for reconciled discharge medications  Wound Care Recommendations:  When applicable, please see wound care section of After Visit Summary  Diet Recommendations at Discharge:  Diet -        Diet Orders            Start     Ordered    02/05/19 0802  Diet Steven/CHO Controlled; Consistent Carbohydrate Diet Level 1 (4 carb servings/60 grams CHO/meal)  Diet effective now     Question Answer Comment   Diet Type Steven/CHO Controlled    Steven/CHO Controlled Consistent Carbohydrate Diet Level 1 (4 carb servings/60 grams CHO/meal)    Special Instructions Double Protein    RD to adjust diet per protocol?  Yes        02/05/19 0802          Instructions for any Catheters / Lines Present at Discharge (including removal date, if applicable): keep garcia in for 1 week, and then attempt voiding trial with urology    Significant Findings / Test Results:     XR chest portable   Final Result by David Rodriges MD (02/04 0185)      No acute cardiopulmonary disease  Workstation performed: [id number]         CT abdomen pelvis wo contrast   Final Result by Dallin Tariq MD (02/03 1121)         1  No significant change in appearance of calcified mesenteric mass, appearance suspicious for carcinoid tumor  2   No significant change in multiple hypoattenuating lesions within the liver, biopsy-proven metastatic neuroendocrine tumor  3   No significant change in bilateral mild to moderate hydroureteronephrosis  No calculi identified  4   Unchanged circumferential bladder wall thickening  Workstation performed: [id number]               Incidental Findings:   · n/a     Test Results Pending at Discharge (will require follow up):   · 24 hour urine collection (to be followed by oncology as outpatient)     Outpatient Tests Requested:  · Voiding trial     Complications:    · hyperglycemia    Hospital Course:     Brandie Dietz is a 77 y o  male patient who originally presented to the hospital on 2/3/2019 due to generalized weakness  Patient was found to have acute cystitis, treated with antibiotics, hyperglycemia which at times required an insulin drip, acute kidney injury improved with IV fluids  Patient was seen in consultation by Nephrology, Urology, and Oncology  Patient recently had a biopsy of a liver mass which ended up demonstrating neuroendocrine tumor  For this patient was seen by Oncology who recommended close outpatient follow up 1-2 weeks after discharge  In terms of the patient's urinary retention, he had his Garcia replaced, and started on Flomax    Urology recommended keeping Garcia in for approximately a week and then to attempt voiding trial   As patient's symptoms improved, he was deemed stable for discharge  Of note, patient did have episodes of hyperglycemia times  Patient was intolerant of his metformin due to his acute kidney injury, and did not wish to be on oral hypoglycemics anymore due to fluctuating blood glucose levels  Patient was started on Levemir, and mealtime insulin along with sliding scale with subsequent improvement in his glucose control  He has been instructed follow up with his primary care physician as an outpatient to further discuss as well  Patient voiced understanding of instructions  Condition at Discharge: good     Discharge Day Visit / Exam:     Subjective:  Patient seen examined  No acute events overnight  Feels much better  Vitals: Blood Pressure: 122/64 (02/07/19 0600)  Pulse: 89 (02/07/19 0600)  Temperature: 97 8 °F (36 6 °C) (02/07/19 0415)  Temp Source: Temporal (02/07/19 0415)  Respirations: 17 (02/07/19 0600)  Height: 5' 8" (172 7 cm) (02/03/19 0905)  Weight - Scale: 68 5 kg (151 lb) (02/03/19 0905)  SpO2: 92 % (02/07/19 0600)  Exam:   Physical Exam   Constitutional: He is oriented to person, place, and time  He appears well-developed and well-nourished  HENT:   Head: Normocephalic and atraumatic  Poor dentition   Eyes: Pupils are equal, round, and reactive to light  EOM are normal    Neck: Normal range of motion  Neck supple  No JVD present  Cardiovascular: Normal rate and regular rhythm  Pulmonary/Chest: Effort normal and breath sounds normal    Abdominal: Soft  Bowel sounds are normal  He exhibits no distension  Musculoskeletal: Normal range of motion  He exhibits no edema  Neurological: He is alert and oriented to person, place, and time  Skin: Skin is warm  Psychiatric: He has a normal mood and affect  His behavior is normal  Thought content normal    Nursing note and vitals reviewed        Discussion with Family: n/a    Goals of Care Discussions:  · Code Status at Discharge: Level 1 - Full Code  · Were there any Goals of Care Discussions during Hospitalization?: Yes  · Results of any General Goals of Care Discussions: full code   · POLST Completed: No   · If POLST Completed, Summary of POLST Agreement Provided Here: n/a   · OK to Rehospitalize if Needed? Yes    Discharge instructions/Information to patient and family:   See after visit summary section titled Discharge Instructions for information provided to patient and family  Planned Readmission: n/a      Discharge Statement:  I spent 35 minutes discharging the patient  This time was spent on the day of discharge  I had direct contact with the patient on the day of discharge  Greater than 50% of the total time was spent examining patient, answering all patient questions, arranging and discussing plan of care with patient as well as directly providing post-discharge instructions  Additional time then spent on discharge activities      ** Please Note: This note has been constructed using a voice recognition system **

## 2019-02-07 NOTE — ASSESSMENT & PLAN NOTE
· Patient with abdominal mesenteric mass with lesions in the liver  · Patient had biopsy of liver lesion on 01/24/19  · 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  · Appreciate Oncology input  · Follow-up in 2-3 weeks as outpatient

## 2019-02-07 NOTE — ASSESSMENT & PLAN NOTE
· Patient with Haile catheter placed a few weeks ago due to obstruction  · Haile replaced  · Completed course of antibiotics  · Urology recommends keeping Haile in an attempt a voiding trial in 1 week and to start Flomax  · Follow up with Urology as an outpatient

## 2019-02-07 NOTE — DISCHARGE INSTR - AVS FIRST PAGE
· Voiding trial in 1 week, follow up with Urology as an outpatient  · Follow-up with Oncology within 1-2 weeks of discharge  · Follow-up CBC, CMP within 3 days of discharge   Baseline hgb 7-8, Transfuse for hgb < 7

## 2019-02-07 NOTE — TELEPHONE ENCOUNTER
Spoke to patient and his wife said he is going to home [facility] [address] [facility] for rehab    I told him I will call him back after I speak to Vargas Mann

## 2019-02-08 LAB
BACTERIA BLD CULT: NORMAL
BACTERIA BLD CULT: NORMAL

## 2019-02-08 NOTE — UTILIZATION REVIEW
Notification of Discharge  This is a Notification of Discharge from our facility [address]  Please be advised that this patient has been discharge from our facility  Below you will find the admission and discharge date and time including the patients disposition  PRESENTATION DATE: 2/3/2019  8:54 AM  IP ADMISSION DATE: 2/3/19 1144  DISCHARGE DATE: 2/7/2019  1:04 PM  DISPOSITION: Non [facility] SNF/TCU/SNU    [address] Utilization Review Department  Phone: [phone]; Fax [phone]  [email or web address]  ATTENTION: Please call with any questions or concerns to [phone]  and carefully listen to the prompts so that you are directed to the right person  Send all requests for admission clinical reviews, approved or denied determinations and any other requests to fax [phone]   All voicemails are confidential

## 2019-02-10 LAB
5OH-INDOLEACETATE 24H UR-MCNC: 16 MG/L
5OH-INDOLEACETATE 24H UR-MRATE: 48 MG/24 HR (ref 0–14.9)

## 2019-02-11 LAB — CGA SERPL-SCNC: 273 NMOL/L (ref 0–5)

## 2019-02-26 ENCOUNTER — DOCUMENTATION (OUTPATIENT)
Dept: HEMATOLOGY ONCOLOGY | Facility: CLINIC | Age: 79
End: 2019-02-26

## 2019-02-26 ENCOUNTER — OFFICE VISIT (OUTPATIENT)
Dept: HEMATOLOGY ONCOLOGY | Facility: CLINIC | Age: 79
End: 2019-02-26
Payer: COMMERCIAL

## 2019-02-26 VITALS
HEIGHT: 68 IN | DIASTOLIC BLOOD PRESSURE: 60 MMHG | TEMPERATURE: 98.2 F | OXYGEN SATURATION: 98 % | HEART RATE: 83 BPM | SYSTOLIC BLOOD PRESSURE: 140 MMHG | BODY MASS INDEX: 14.88 KG/M2 | WEIGHT: 98.2 LBS | RESPIRATION RATE: 18 BRPM

## 2019-02-26 DIAGNOSIS — C7A.8 NEUROENDOCRINE CARCINOMA METASTATIC TO LIVER (HCC): ICD-10-CM

## 2019-02-26 DIAGNOSIS — D3A.8 NEUROENDOCRINE TUMOR: Primary | ICD-10-CM

## 2019-02-26 DIAGNOSIS — D64.9 ANEMIA, UNSPECIFIED TYPE: ICD-10-CM

## 2019-02-26 DIAGNOSIS — C7B.8 NEUROENDOCRINE CARCINOMA METASTATIC TO LIVER (HCC): ICD-10-CM

## 2019-02-26 PROCEDURE — 99214 OFFICE O/P EST MOD 30 MIN: CPT | Performed by: INTERNAL MEDICINE

## 2019-02-26 NOTE — PROGRESS NOTES
Patient education given on Laneorotide,  Patient and daughter present and engaged  Discussed mechanism of action, administration, supportive care measures, and possible adverse effects    Patient provided with   educational brochures on Lanreotide     Patient instructed to hydrate himself well (minimum 2 lt per day excluding caffeineated beverages) call if he develops significant adverse effects, temperature over 100 4° F, or with any further questions or concerns  Patient is aware of how to contact provider/nurse during and after office hours  Patient's questions answered to his/her satisfaction and the patient expresses understanding and acceptance of instructions and treatment  Patient instructed to keep a food diary to aid in the help of what foods may trigger side effects  Patient presently in a SNF but soon to be discharged home, patient is a diabetic so reviewed the side effects of hypoglycemia and hyperglycemia  Consent was signed

## 2019-02-26 NOTE — PROGRESS NOTES
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 [facility])  The patient and his daughter were both educated about the diagnosis of metastatic carcinoid tumor  He was told that 2 we will need to get him started on the palliative Lanreotide injections on every 4 week basis to control his symptoms in his metastatic carcinoid tumor hopefully without any significant side effects  His tumor markers including the serum chromogranin a level will be checked regularly prior to each visit  - CBC and differential; Future  - Comprehensive metabolic panel; Future  - Chromogranin A; Future    2  Anemia, unspecified type  The patient seemed to have significant anemia on the 6 of February which needs to be further monitored  We will recheck his CBC today to make it decision regarding further workup  HPI:  This is a 69-year-old gentleman with multiple comorbid conditions including history of coronary artery disease, diabetes mellitus, hypertension, hyperlipidemia, and more recently metastatic neuroendocrine tumor  The patient was seen in consultation when he was in the hospital on the 5th February 2019 at that time he had significant weakness status post vasovagal syncope  He was evaluated with a CT scan of the abdomen and pelvis on the 23rd January 2019 which showed partially calcified mesenteric mass with multiple liver lesions compatible most likely with carcinoid malignancy  He also was found to have bilateral hydronephrosis and severe bladder wall thickening with prostatomegaly  The patient then had a core biopsy from 1 of the liver lesions on the 24th of January which showed well-differentiated neuroendocrine tumor grade 1-2 with Ki 67 of 3-5%  His chromogranin level on the 6 of February was 273    His 24 hour urine for HIAA was 48 which is above normal   The patient also was found to have anemia which was worked up during the hospital stay  His most recent measured CBC was on the 6th of February which showed a hemoglobin of 7 7  His white cell count and platelets were within normal range  The anemia workup showed high ferritin level and haptoglobin which is compatible with inflammatory process  His sed rate and C-reactive protein are pretty high which goes with inflammation  His LDH seems to be within normal range  His direct Judd test was negative  His creatinine was 2 2  The patient right now is in the rehab unit and is going to be discharged home pretty soon  He continues to have Haile catheter placed  Interval history:    ROS: Review of Systems   Constitutional: Positive for fatigue  Negative for appetite change, diaphoresis and fever  HENT: Positive for hearing loss  Negative for congestion, dental problem, facial swelling, tinnitus and trouble swallowing  Eyes: Negative for visual disturbance  Respiratory: Negative for cough, chest tightness, shortness of breath and wheezing  Cardiovascular: Negative for chest pain and leg swelling  Gastrointestinal: Negative for abdominal distention, abdominal pain, blood in stool, constipation, diarrhea, nausea and vomiting  Genitourinary: Negative for dysuria, hematuria and urgency  Musculoskeletal: Negative for arthralgias, myalgias and neck pain  Skin: Negative  Negative for color change, pallor, rash and wound  Neurological: Positive for weakness and numbness  Negative for dizziness and headaches  Hematological: Negative for adenopathy  Psychiatric/Behavioral: Negative for agitation, behavioral problems, confusion, hallucinations, self-injury and sleep disturbance  The patient is not nervous/anxious and is not hyperactive          Past Medical History:   Diagnosis Date    Cardiac disease     Coronary artery disease     Diabetes mellitus ([address] )     Hyperlipidemia     Hypertension        Past Surgical History:   Procedure Laterality Date    IR IMAGE GUIDED BIOPSY/ASPIRATION LIVER  1/24/2019       Social History     Socioeconomic History    Marital status:       Spouse name: None    Number of children: None    Years of education: None    Highest education level: None   Occupational History    None   Social Needs    Financial resource strain: None    Food insecurity:     Worry: None     Inability: None    Transportation needs:     Medical: None     Non-medical: None   Tobacco Use    Smoking status: Never Smoker    Smokeless tobacco: Never Used   Substance and Sexual Activity    Alcohol use: No    Drug use: No    Sexual activity: Never   Lifestyle    Physical activity:     Days per week: None     Minutes per session: None    Stress: None   Relationships    Social connections:     Talks on phone: None     Gets together: None     Attends [employer] service: None     Active member of club or organization: None     Attends meetings of clubs or organizations: None     Relationship status: None    Intimate partner violence:     Fear of current or ex partner: None     Emotionally abused: None     Physically abused: None     Forced sexual activity: None   Other Topics Concern    None   Social History Narrative    None       Family History   Problem Relation Age of Onset    Cancer Mother     Hypertension Father     Cancer Brother     Cancer Maternal Grandmother     Cancer Paternal Aunt        No Known Allergies      Current Outpatient Medications:     amLODIPine (NORVASC) 5 mg tablet, Take 1 tablet (5 mg total) by mouth daily, Disp: 30 tablet, Rfl: 0    aspirin 81 MG tablet, Take 81 mg by mouth daily, Disp: , Rfl:     insulin detemir (LEVEMIR) 100 units/mL subcutaneous injection, Inject 15 Units under the skin daily at bedtime, Disp: , Rfl: 0    insulin lispro (HumaLOG) 100 units/mL injection, Inject 1-5 Units under the skin daily at bedtime, Disp: , Rfl: 0    insulin lispro (HumaLOG) 100 units/mL injection, Inject 1-6 Units under the skin 3 (three) times a day before meals, Disp: , Rfl: 0    insulin lispro (HumaLOG) 100 units/mL injection, Inject 5 Units under the skin 3 (three) times a day with meals, Disp: , Rfl: 0    pantoprazole (PROTONIX) 40 mg tablet, Take 1 tablet (40 mg total) by mouth daily in the early morning, Disp: , Rfl: 0    simethicone (MYLICON) 80 mg chewable tablet, Chew 1 tablet (80 mg total) every 6 (six) hours as needed for flatulence, Disp: 30 tablet, Rfl: 0    tamsulosin (FLOMAX) 0 4 mg, Take 1 capsule (0 4 mg total) by mouth daily with dinner, Disp: , Rfl: 0      Physical Exam:  /60 (BP Location: Left arm, Cuff Size: Adult)   Pulse 83   Temp 98 2 °F (36 8 °C) (Tympanic)   Resp 18   Ht 5' 8" (1 727 m)   Wt 44 5 kg (98 lb 3 2 oz)   SpO2 98%   BMI 14 93 kg/m²     Physical Exam   Constitutional: He is oriented to person, place, and time  He appears well-developed and well-nourished  HENT:   Head: Normocephalic and atraumatic  Nose: Nose normal    Mouth/Throat: Oropharynx is clear and moist    Eyes: Pupils are equal, round, and reactive to light  Conjunctivae and EOM are normal  Right eye exhibits no discharge  Left eye exhibits no discharge  No scleral icterus  Neck: Normal range of motion  Neck supple  No tracheal deviation present  No thyromegaly present  Cardiovascular: Normal rate, regular rhythm and normal heart sounds  Exam reveals no friction rub  No murmur heard  Pulmonary/Chest: Effort normal and breath sounds normal  No respiratory distress  He has no wheezes  He has no rales  He exhibits no tenderness  Abdominal: Soft  Bowel sounds are normal  He exhibits no distension and no mass  There is no hepatosplenomegaly, splenomegaly or hepatomegaly  There is no tenderness  There is no rebound and no guarding  Genitourinary:   Genitourinary Comments: Haile catheter in place   Musculoskeletal: Normal range of motion   He exhibits no edema, tenderness or deformity  Sitting in a wheelchair   Lymphadenopathy:     He has no cervical adenopathy  Neurological: He is alert and oriented to person, place, and time  He has normal reflexes  He displays normal reflexes  No cranial nerve deficit  Coordination normal    Skin: Skin is warm and dry  No rash noted  No erythema  No pallor  Psychiatric: He has a normal mood and affect  His behavior is normal  Judgment and thought content normal          Labs:  Lab Results   Component Value Date    WBC 9 80 02/06/2019    HGB 7 7 (L) 02/06/2019    HCT 22 7 (L) 02/06/2019    MCV 91 02/06/2019     02/06/2019     Lab Results   Component Value Date    K 4 1 02/07/2019     02/07/2019    CO2 25 02/07/2019    BUN 42 (H) 02/07/2019    CREATININE 1 98 (H) 02/07/2019    GLUF 166 (H) 01/28/2019    CALCIUM 9 1 02/07/2019    AST 19 02/06/2019    ALT 25 02/06/2019    ALKPHOS 115 02/06/2019    EGFR 31 02/07/2019     No results found for: TSH    Patient voiced understanding and agreement in the above discussion  Aware to contact our office with questions/symptoms in the interim

## 2019-03-05 RX ORDER — LANREOTIDE ACETATE 120 MG/.5ML
120 INJECTION SUBCUTANEOUS ONCE
Status: COMPLETED | OUTPATIENT
Start: 2019-03-06 | End: 2019-03-06

## 2019-03-06 ENCOUNTER — HOSPITAL ENCOUNTER (OUTPATIENT)
Dept: INFUSION CENTER | Facility: HOSPITAL | Age: 79
Discharge: HOME | End: 2019-03-06
Payer: COMMERCIAL

## 2019-03-06 VITALS
HEART RATE: 70 BPM | SYSTOLIC BLOOD PRESSURE: 160 MMHG | DIASTOLIC BLOOD PRESSURE: 87 MMHG | OXYGEN SATURATION: 96 % | TEMPERATURE: 96.5 F | RESPIRATION RATE: 18 BRPM

## 2019-03-06 PROCEDURE — 96372 THER/PROPH/DIAG INJ SC/IM: CPT

## 2019-03-06 RX ADMIN — LANREOTIDE ACETATE 120 MG: 120 INJECTION SUBCUTANEOUS at 13:19

## 2019-03-06 NOTE — PLAN OF CARE
Problem: Potential for Falls  Goal: Patient will remain free of falls  Description  INTERVENTIONS:  - Assess patient frequently for physical needs  -  Identify cognitive and physical deficits and behaviors that affect risk of falls  -  [place] fall precautions as indicated by assessment   - Educate patient/family on patient safety including physical limitations  - Instruct patient to call for assistance with activity based on assessment  - Modify environment to reduce risk of injury  - Consider OT/PT consult to assist with strengthening/mobility  Outcome: Progressing     Problem: Knowledge Deficit  Goal: Patient/family/caregiver demonstrates understanding of disease process, treatment plan, medications, and discharge instructions  Description  Complete learning assessment and assess knowledge base    Interventions:  - Provide teaching at level of understanding  - Provide teaching via preferred learning methods  Outcome: Progressing

## 2019-03-06 NOTE — PROGRESS NOTES
Patient here for injection  Offering no complaints  No labs required per note from [address] at Dr Alexandra Zee  Tolerated injection well  AVS provided  Left unit in stable condition

## 2019-03-06 NOTE — DISCHARGE INSTRUCTIONS
Lanreotide (Under the skin)   Lanreotide (eig-EXT-ih-tide)  Treats acromegaly (a growth hormone disorder) and tumors in the stomach, bowels, or pancreas  Brand Name(s): Somatuline [place]   There may be other brand names for this medicine  When This Medicine Should Not Be Used: This medicine is generally considered safe for most people  Talk to your doctor if you have concerns  How to Use This Medicine:   Injectable  · Your doctor will prescribe your exact dose and tell you how often it should be given  This medicine is given as a shot under your skin  · A nurse or other health provider will give you this medicine  · Read and follow the patient instructions that come with this medicine  Talk to your doctor or pharmacist if you have any questions  · Missed dose: Call your doctor or pharmacist for instructions  Drugs and Foods to Avoid:   Ask your doctor or pharmacist before using any other medicine, including over-the-counter medicines, vitamins, and herbal products  · Some medicines can affect how lanreotide works  Tell your doctor if you are using any of the following:   ¨ Bromocriptine, cyclosporine, quinidine, terfenadine  ¨ Blood pressure or heart medicine, such as beta-blockers  ¨ Insulin or diabetes medicine  Warnings While Using This Medicine:   · Tell your doctor if you are pregnant or breastfeeding, or if you have kidney disease, liver disease, diabetes, gallbladder disease, heart problems, or thyroid problems  · This medicine may cause the following problems:   ¨ Gallstones  ¨ Changes in blood sugar level  ¨ High blood pressure  · This medicine may make you dizzy  Do not drive or do anything else that could be dangerous until you know how this medicine affects you  · Your doctor will do lab tests at regular visits to check on the effects of this medicine  Keep all appointments    Possible Side Effects While Using This Medicine:   Call your doctor right away if you notice any of these side effects:  · Allergic reaction: Itching or hives, swelling in your face or hands, swelling or tingling in your mouth or throat, chest tightness, trouble breathing  · Increased thirst, hunger, or urination  · Severe stomach pain with nausea and vomiting, yellow skin or eyes  · Shaking, trembling, sweating, fast or pounding heartbeat, lightheadedness, confusion  · Slow heartbeat, trouble breathing  · Unusual bleeding, bruising, or weakness  If you notice these less serious side effects, talk with your doctor:   · Constipation, diarrhea, gas, nausea, vomiting, or stomach pain  · Headache or dizziness  · Joint or muscle pain  · Pain, itching, or a lump under your skin where the shot is given  · Weight loss  If you notice other side effects that you think are caused by this medicine, tell your doctor  Call your doctor for medical advice about side effects  You may report side effects to FDA at 4-472-FDA-0182  © 2017 [employer]  Information is for End User's use only and may not be sold, redistributed or otherwise used for commercial purposes  The above information is an  only  It is not intended as medical advice for individual conditions or treatments  Talk to your doctor, nurse or pharmacist before following any medical regimen to see if it is safe and effective for you

## 2019-03-08 ENCOUNTER — HOSPITAL ENCOUNTER (EMERGENCY)
Facility: HOSPITAL | Age: 79
Discharge: HOME | End: 2019-03-08
Attending: EMERGENCY MEDICINE | Admitting: EMERGENCY MEDICINE
Payer: COMMERCIAL

## 2019-03-08 ENCOUNTER — APPOINTMENT (EMERGENCY)
Dept: RADIOLOGY | Facility: HOSPITAL | Age: 79
End: 2019-03-08
Payer: COMMERCIAL

## 2019-03-08 VITALS
OXYGEN SATURATION: 100 % | DIASTOLIC BLOOD PRESSURE: 88 MMHG | WEIGHT: 160 LBS | RESPIRATION RATE: 20 BRPM | SYSTOLIC BLOOD PRESSURE: 190 MMHG | HEIGHT: 68 IN | TEMPERATURE: 98.4 F | BODY MASS INDEX: 24.25 KG/M2 | HEART RATE: 80 BPM

## 2019-03-08 DIAGNOSIS — L97.519 ULCER OF RIGHT FOOT, UNSPECIFIED ULCER STAGE (HCC): Primary | ICD-10-CM

## 2019-03-08 DIAGNOSIS — L03.818 CELLULITIS OF OTHER SPECIFIED SITE: ICD-10-CM

## 2019-03-08 LAB
ALBUMIN SERPL BCP-MCNC: 3.8 G/DL (ref 3.5–5.7)
ALP SERPL-CCNC: 86 U/L (ref 55–165)
ALT SERPL W P-5'-P-CCNC: 12 U/L (ref 7–52)
ANION GAP SERPL CALCULATED.3IONS-SCNC: 8 MMOL/L (ref 4–13)
AST SERPL W P-5'-P-CCNC: 13 U/L (ref 13–39)
BASOPHILS # BLD AUTO: 0.1 THOUSANDS/ΜL (ref 0–0.1)
BASOPHILS NFR BLD AUTO: 1 % (ref 0–2)
BILIRUB SERPL-MCNC: 0.4 MG/DL (ref 0.2–1)
BUN SERPL-MCNC: 35 MG/DL (ref 7–25)
CALCIUM SERPL-MCNC: 9 MG/DL (ref 8.6–10.5)
CHLORIDE SERPL-SCNC: 104 MMOL/L (ref 98–107)
CO2 SERPL-SCNC: 23 MMOL/L (ref 21–31)
CREAT SERPL-MCNC: 1.81 MG/DL (ref 0.7–1.3)
EOSINOPHIL # BLD AUTO: 0.1 THOUSAND/ΜL (ref 0–0.61)
EOSINOPHIL NFR BLD AUTO: 2 % (ref 0–5)
ERYTHROCYTE [DISTWIDTH] IN BLOOD BY AUTOMATED COUNT: 14.2 % (ref 11.5–14.5)
GFR SERPL CREATININE-BSD FRML MDRD: 35 ML/MIN/1.73SQ M
GLUCOSE SERPL-MCNC: 254 MG/DL (ref 65–99)
HCT VFR BLD AUTO: 30.7 % (ref 42–47)
HGB BLD-MCNC: 10 G/DL (ref 14–18)
LACTATE SERPL-SCNC: 1.2 MMOL/L (ref 0.5–2)
LYMPHOCYTES # BLD AUTO: 1.1 THOUSANDS/ΜL (ref 0.6–4.47)
LYMPHOCYTES NFR BLD AUTO: 12 % (ref 21–51)
MCH RBC QN AUTO: 30 PG (ref 26–34)
MCHC RBC AUTO-ENTMCNC: 32.5 G/DL (ref 31–37)
MCV RBC AUTO: 92 FL (ref 81–99)
MONOCYTES # BLD AUTO: 0.9 THOUSAND/ΜL (ref 0.17–1.22)
MONOCYTES NFR BLD AUTO: 10 % (ref 2–12)
NEUTROPHILS # BLD AUTO: 6.8 THOUSANDS/ΜL (ref 1.4–6.5)
NEUTS SEG NFR BLD AUTO: 76 % (ref 42–75)
NRBC BLD AUTO-RTO: 0 /100 WBCS
PLATELET # BLD AUTO: 151 THOUSANDS/UL (ref 149–390)
PMV BLD AUTO: 9 FL (ref 8.6–11.7)
POTASSIUM SERPL-SCNC: 3.9 MMOL/L (ref 3.5–5.5)
PROT SERPL-MCNC: 6.9 G/DL (ref 6.4–8.9)
RBC # BLD AUTO: 3.33 MILLION/UL (ref 4.3–5.9)
SODIUM SERPL-SCNC: 135 MMOL/L (ref 134–143)
WBC # BLD AUTO: 8.9 THOUSAND/UL (ref 4.8–10.8)

## 2019-03-08 PROCEDURE — 85025 COMPLETE CBC W/AUTO DIFF WBC: CPT | Performed by: PHYSICIAN ASSISTANT

## 2019-03-08 PROCEDURE — 99283 EMERGENCY DEPT VISIT LOW MDM: CPT

## 2019-03-08 PROCEDURE — 36415 COLL VENOUS BLD VENIPUNCTURE: CPT | Performed by: PHYSICIAN ASSISTANT

## 2019-03-08 PROCEDURE — 73630 X-RAY EXAM OF FOOT: CPT

## 2019-03-08 PROCEDURE — 80053 COMPREHEN METABOLIC PANEL: CPT | Performed by: PHYSICIAN ASSISTANT

## 2019-03-08 PROCEDURE — 96365 THER/PROPH/DIAG IV INF INIT: CPT

## 2019-03-08 PROCEDURE — 83605 ASSAY OF LACTIC ACID: CPT | Performed by: PHYSICIAN ASSISTANT

## 2019-03-08 RX ORDER — SODIUM CHLORIDE 9 MG/ML
125 INJECTION, SOLUTION INTRAVENOUS CONTINUOUS
Status: DISCONTINUED | OUTPATIENT
Start: 2019-03-08 | End: 2019-03-08 | Stop reason: HOSPADM

## 2019-03-08 RX ORDER — LEVOFLOXACIN 5 MG/ML
750 INJECTION, SOLUTION INTRAVENOUS ONCE
Status: COMPLETED | OUTPATIENT
Start: 2019-03-08 | End: 2019-03-08

## 2019-03-08 RX ORDER — LEVOFLOXACIN 500 MG/1
500 TABLET, FILM COATED ORAL DAILY
Qty: 5 TABLET | Refills: 0 | Status: SHIPPED | OUTPATIENT
Start: 2019-03-08 | End: 2019-03-13

## 2019-03-08 RX ADMIN — LEVOFLOXACIN 750 MG: 5 INJECTION, SOLUTION INTRAVENOUS at 17:00

## 2019-03-08 RX ADMIN — SODIUM CHLORIDE 125 ML/HR: 0.9 INJECTION, SOLUTION INTRAVENOUS at 17:00

## 2019-03-08 NOTE — ED PROVIDER NOTES
History  Chief Complaint   Patient presents with    Foot Ulcer     blister     Patient presents with 3 day history of right heel pain and blister  Pain is worsening, patient denies injury  Patient saw Dr Harry Jones at PCP office today, was told go to ED as patient is a diabetic and 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therapy  Foot Injury - Major   Location:  Foot  Time since incident:  3 days  Injury: no    Foot location:  R foot  Pain details:     Radiates to:  Does not radiate    Severity:  Moderate    Onset quality:  Gradual    Duration:  3 days    Timing:  Constant    Progression:  Worsening  Chronicity:  New  Dislocation: no    Prior injury to area:  No  Relieved by:  Nothing  Worsened by:  Bearing weight  Associated symptoms: no back pain, no fever, no itching, no numbness, no swelling and no tingling        Prior to Admission Medications   Prescriptions Last Dose Informant Patient Reported? Taking?    amLODIPine (NORVASC) 5 mg tablet   No No   Sig: Take 1 tablet (5 mg total) by mouth daily   aspirin 81 MG tablet   Yes No   Sig: Take 81 mg by mouth daily   insulin detemir (LEVEMIR) 100 units/mL subcutaneous injection   No No   Sig: Inject 15 Units under the skin daily at bedtime   insulin lispro (HumaLOG) 100 units/mL injection   No No   Sig: Inject 1-5 Units under the skin daily at bedtime   insulin lispro (HumaLOG) 100 units/mL injection   No No   Sig: Inject 1-6 Units under the skin 3 (three) times a day before meals   insulin lispro (HumaLOG) 100 units/mL injection   No No   Sig: Inject 5 Units under the skin 3 (three) times a day with meals   pantoprazole (PROTONIX) 40 mg tablet   No No   Sig: Take 1 tablet (40 mg total) by mouth daily in the early morning   simethicone (MYLICON) 80 mg chewable tablet   No No   Sig: Chew 1 tablet (80 mg total) every 6 (six) hours as needed for flatulence   tamsulosin (FLOMAX) 0 4 mg   No No   Sig: Take 1 capsule (0 4 mg total) by mouth daily with dinner      Facility-Administered Medications: None       Past Medical History:   Diagnosis Date    Cardiac disease     Coronary artery disease     Diabetes mellitus ([address] )     Hyperlipidemia     Hypertension        Past Surgical History:   Procedure Laterality Date    CORONARY ANGIOPLASTY WITH STENT PLACEMENT      IR IMAGE GUIDED BIOPSY/ASPIRATION LIVER  1/24/2019       Family History   Problem Relation Age of Onset    Cancer Mother     Hypertension Father     Cancer Brother     Cancer Maternal Grandmother     Cancer Paternal Aunt      I have reviewed and agree with the history as documented  Social History     Tobacco Use    Smoking status: Never Smoker    Smokeless tobacco: Never Used   Substance Use Topics    Alcohol use: No    Drug use: No        Review of Systems   Constitutional: Negative for chills and fever  HENT: Negative for congestion, rhinorrhea and sore throat  Eyes: Negative for visual disturbance  Respiratory: Negative for cough and shortness of breath  Cardiovascular: Negative for chest pain  Gastrointestinal: Negative for abdominal pain, diarrhea, nausea and vomiting  Genitourinary: Negative for dysuria  Musculoskeletal: Negative for back pain  Skin: Positive for rash  Negative for itching  Neurological: Negative for weakness, numbness and headaches  Psychiatric/Behavioral: Negative for confusion  Physical Exam  Physical Exam   Constitutional: He appears well-developed and well-nourished  HENT:   Head: Normocephalic and atraumatic  Right Ear: External ear normal    Left Ear: External ear normal    Nose: Nose normal    Mouth/Throat: Oropharynx is clear and moist    Eyes: Conjunctivae and EOM are normal    Neck: Normal range of motion  Neck supple  Cardiovascular: Normal rate, regular rhythm, normal heart sounds and intact distal pulses  Pulmonary/Chest: Effort normal and breath sounds normal    Musculoskeletal: Normal range of motion  Neurological: He is alert   No sensory deficit  Skin: Skin is warm and dry  Capillary refill takes less than 2 seconds  Left heel with appx  4 cm diameter bullous lesion with surrounding erythema, tenderness   Nursing note and vitals reviewed  Vital Signs  ED Triage Vitals [03/08/19 1516]   Temperature Pulse Respirations Blood Pressure SpO2   97 7 °F (36 5 °C) 87 16 (!) 171/75 100 %      Temp Source Heart Rate Source Patient Position - Orthostatic VS BP Location FiO2 (%)   Temporal -- Sitting Left arm --      Pain Score       5           Vitals:    03/08/19 1516   BP: (!) 171/75   Pulse: 87   Patient Position - Orthostatic VS: Sitting       Visual Acuity      ED Medications  Medications   sodium chloride 0 9 % infusion (125 mL/hr Intravenous New Bag 3/8/19 1700)   levofloxacin (LEVAQUIN) IVPB (premix) 750 mg (750 mg Intravenous New Bag 3/8/19 1700)       Diagnostic Studies  Results Reviewed     Procedure Component Value Units Date/Time    Lactic acid, plasma [[id number]]  (Normal) Collected:  03/08/19 1620    Lab Status:  Final result Specimen:  Blood from Arm, Left Updated:  03/08/19 1642     LACTIC ACID 1 2 mmol/L     Narrative:       Result may be elevated if tourniquet was used during collection      Comprehensive metabolic panel [[id number]]  (Abnormal) Collected:  03/08/19 1620    Lab Status:  Final result Specimen:  Blood from Arm, Left Updated:  03/08/19 1642     Sodium 135 mmol/L      Potassium 3 9 mmol/L      Chloride 104 mmol/L      CO2 23 mmol/L      ANION GAP 8 mmol/L      BUN 35 mg/dL      Creatinine 1 81 mg/dL      Glucose 254 mg/dL      Calcium 9 0 mg/dL      AST 13 U/L      ALT 12 U/L      Alkaline Phosphatase 86 U/L      Total Protein 6 9 g/dL      Albumin 3 8 g/dL      Total Bilirubin 0 40 mg/dL      eGFR 35 ml/min/1 73sq m     Narrative:       National Kidney Disease Education Program recommendations are as follows:  GFR calculation is accurate only with a steady state creatinine  Chronic Kidney disease less than 60 ml/min/1 73 sq  meters  Kidney failure less than 15 ml/min/1 73 sq  meters  CBC and differential [[id number]]  (Abnormal) Collected:  03/08/19 1620    Lab Status:  Final result Specimen:  Blood from Arm, Left Updated:  03/08/19 1628     WBC 8 90 Thousand/uL      RBC 3 33 Million/uL      Hemoglobin 10 0 g/dL      Hematocrit 30 7 %      MCV 92 fL      MCH 30 0 pg      MCHC 32 5 g/dL      RDW 14 2 %      MPV 9 0 fL      Platelets 555 Thousands/uL      nRBC 0 /100 WBCs      Neutrophils Relative 76 %      Lymphocytes Relative 12 %      Monocytes Relative 10 %      Eosinophils Relative 2 %      Basophils Relative 1 %      Neutrophils Absolute 6 80 Thousands/µL      Lymphocytes Absolute 1 10 Thousands/µL      Monocytes Absolute 0 90 Thousand/µL      Eosinophils Absolute 0 10 Thousand/µL      Basophils Absolute 0 10 Thousands/µL                  XR foot 3+ vw right   Final Result by Blanca Cohen MD (03/08 1648)      No radiographic findings to suggest osteomyelitis  No acute osseous abnormality  Workstation performed: [id number]                    Procedures  Procedures       Phone Contacts  ED Phone Contact    ED Course  ED Course as of Mar 08 1732   Vita Mays Mar 08, 2019   1555 Will evaluate for signs of severe or systemic infection, treat accordingly  18 Workup does not indicate severe or systemic infection  Will treat with PO abx  And refer to podiatry                                    MDM    Disposition  Final diagnoses:   Ulcer of right foot, unspecified ulcer stage ([facility] [address] )   Cellulitis of other specified site     Time reflects when diagnosis was documented in both MDM as applicable and the Disposition within this note     Time User Action Codes Description Comment    3/8/2019  5:27 PM Marjan Nurse Z Add [V92 942] Ulcer of right foot, unspecified ulcer stage ([facility] )     3/8/2019  5:27 PM Kin Pa Add [L03 [id number]] Cellulitis of other specified site       ED Disposition     ED Disposition Condition Date/Time Comment    Discharge Stable Fri Mar 8, 2019  5:27 PM Jami Ivy discharge to home/self care  Follow-up Information     Follow up With Specialties Details Why Contact Info    Sheri Gallagher DPM Podiatry Schedule an appointment as soon as possible for a visit   [address] [place] [address]  [phone]            Patient's Medications   Discharge Prescriptions    LEVOFLOXACIN (LEVAQUIN) 500 MG TABLET    Take 1 tablet (500 mg total) by mouth daily for 5 days       Start Date: 3/8/2019  End Date: 3/13/2019       Order Dose: 500 mg       Quantity: 5 tablet    Refills: 0     No discharge procedures on file      ED Provider  Electronically Signed by           Jimi Mackey PA-C  03/08/19 0565

## 2019-03-11 ENCOUNTER — TRANSCRIBE ORDERS (OUTPATIENT)
Dept: ADMINISTRATIVE | Facility: HOSPITAL | Age: 79
End: 2019-03-11

## 2019-03-11 DIAGNOSIS — L97.311 NON-PRESSURE CHRONIC ULCER OF RIGHT ANKLE, LIMITED TO BREAKDOWN OF SKIN (HCC): Primary | ICD-10-CM

## 2019-03-12 ENCOUNTER — TRANSCRIBE ORDERS (OUTPATIENT)
Dept: ADMINISTRATIVE | Facility: HOSPITAL | Age: 79
End: 2019-03-12

## 2019-03-14 ENCOUNTER — LAB (OUTPATIENT)
Dept: LAB | Facility: CLINIC | Age: 79
End: 2019-03-14
Payer: COMMERCIAL

## 2019-03-14 ENCOUNTER — TRANSCRIBE ORDERS (OUTPATIENT)
Dept: LAB | Facility: CLINIC | Age: 79
End: 2019-03-14

## 2019-03-14 DIAGNOSIS — L03.031 ABSCESS OF FIFTH TOENAIL OF RIGHT FOOT: Primary | ICD-10-CM

## 2019-03-14 DIAGNOSIS — L03.031 ABSCESS OF FIFTH TOENAIL OF RIGHT FOOT: ICD-10-CM

## 2019-03-14 LAB
ALBUMIN SERPL BCP-MCNC: 3.4 G/DL (ref 3.5–5)
ALP SERPL-CCNC: 95 U/L (ref 46–116)
ALT SERPL W P-5'-P-CCNC: 17 U/L (ref 12–78)
ANION GAP SERPL CALCULATED.3IONS-SCNC: 7 MMOL/L (ref 4–13)
AST SERPL W P-5'-P-CCNC: 10 U/L (ref 5–45)
BASOPHILS # BLD AUTO: 0.09 THOUSANDS/ΜL (ref 0–0.1)
BASOPHILS NFR BLD AUTO: 1 % (ref 0–1)
BILIRUB SERPL-MCNC: 0.58 MG/DL (ref 0.2–1)
BUN SERPL-MCNC: 32 MG/DL (ref 5–25)
CALCIUM SERPL-MCNC: 8.7 MG/DL (ref 8.3–10.1)
CHLORIDE SERPL-SCNC: 110 MMOL/L (ref 100–108)
CO2 SERPL-SCNC: 25 MMOL/L (ref 21–32)
CREAT SERPL-MCNC: 1.82 MG/DL (ref 0.6–1.3)
EOSINOPHIL # BLD AUTO: 0.19 THOUSAND/ΜL (ref 0–0.61)
EOSINOPHIL NFR BLD AUTO: 3 % (ref 0–6)
ERYTHROCYTE [DISTWIDTH] IN BLOOD BY AUTOMATED COUNT: 14.1 % (ref 11.6–15.1)
GFR SERPL CREATININE-BSD FRML MDRD: 34 ML/MIN/1.73SQ M
GLUCOSE P FAST SERPL-MCNC: 62 MG/DL (ref 65–99)
HCT VFR BLD AUTO: 33.5 % (ref 36.5–49.3)
HGB BLD-MCNC: 10.8 G/DL (ref 12–17)
IMM GRANULOCYTES # BLD AUTO: 0.01 THOUSAND/UL (ref 0–0.2)
IMM GRANULOCYTES NFR BLD AUTO: 0 % (ref 0–2)
LYMPHOCYTES # BLD AUTO: 1.68 THOUSANDS/ΜL (ref 0.6–4.47)
LYMPHOCYTES NFR BLD AUTO: 26 % (ref 14–44)
MCH RBC QN AUTO: 30.3 PG (ref 26.8–34.3)
MCHC RBC AUTO-ENTMCNC: 32.2 G/DL (ref 31.4–37.4)
MCV RBC AUTO: 94 FL (ref 82–98)
MONOCYTES # BLD AUTO: 0.6 THOUSAND/ΜL (ref 0.17–1.22)
MONOCYTES NFR BLD AUTO: 9 % (ref 4–12)
NEUTROPHILS # BLD AUTO: 4.03 THOUSANDS/ΜL (ref 1.85–7.62)
NEUTS SEG NFR BLD AUTO: 61 % (ref 43–75)
NRBC BLD AUTO-RTO: 0 /100 WBCS
PLATELET # BLD AUTO: 179 THOUSANDS/UL (ref 149–390)
PMV BLD AUTO: 12.2 FL (ref 8.9–12.7)
POTASSIUM SERPL-SCNC: 3.9 MMOL/L (ref 3.5–5.3)
PROT SERPL-MCNC: 7.2 G/DL (ref 6.4–8.2)
RBC # BLD AUTO: 3.56 MILLION/UL (ref 3.88–5.62)
SODIUM SERPL-SCNC: 142 MMOL/L (ref 136–145)
WBC # BLD AUTO: 6.6 THOUSAND/UL (ref 4.31–10.16)

## 2019-03-14 PROCEDURE — 85025 COMPLETE CBC W/AUTO DIFF WBC: CPT

## 2019-03-14 PROCEDURE — 36415 COLL VENOUS BLD VENIPUNCTURE: CPT

## 2019-03-14 PROCEDURE — 80053 COMPREHEN METABOLIC PANEL: CPT

## 2019-03-22 ENCOUNTER — HOSPITAL ENCOUNTER (OUTPATIENT)
Dept: NON INVASIVE DIAGNOSTICS | Facility: HOSPITAL | Age: 79
Discharge: HOME | End: 2019-03-22
Attending: PODIATRIST
Payer: COMMERCIAL

## 2019-03-22 DIAGNOSIS — L97.311 NON-PRESSURE CHRONIC ULCER OF RIGHT ANKLE, LIMITED TO BREAKDOWN OF SKIN (HCC): ICD-10-CM

## 2019-03-22 PROCEDURE — 93925 LOWER EXTREMITY STUDY: CPT | Performed by: SURGERY

## 2019-03-22 PROCEDURE — 93925 LOWER EXTREMITY STUDY: CPT

## 2019-03-22 PROCEDURE — 93923 UPR/LXTR ART STDY 3+ LVLS: CPT

## 2019-03-22 PROCEDURE — 93922 UPR/L XTREMITY ART 2 LEVELS: CPT | Performed by: SURGERY

## 2019-03-26 ENCOUNTER — TRANSCRIBE ORDERS (OUTPATIENT)
Dept: LAB | Facility: CLINIC | Age: 79
End: 2019-03-26

## 2019-03-26 ENCOUNTER — LAB (OUTPATIENT)
Dept: LAB | Facility: CLINIC | Age: 79
End: 2019-03-26
Payer: COMMERCIAL

## 2019-03-26 DIAGNOSIS — D3A.8 NEUROENDOCRINE TUMOR: ICD-10-CM

## 2019-03-26 LAB
ALBUMIN SERPL BCP-MCNC: 3.4 G/DL (ref 3.5–5)
ALP SERPL-CCNC: 90 U/L (ref 46–116)
ALT SERPL W P-5'-P-CCNC: 18 U/L (ref 12–78)
ANION GAP SERPL CALCULATED.3IONS-SCNC: 4 MMOL/L (ref 4–13)
AST SERPL W P-5'-P-CCNC: 15 U/L (ref 5–45)
BASOPHILS # BLD AUTO: 0.09 THOUSANDS/ΜL (ref 0–0.1)
BASOPHILS NFR BLD AUTO: 2 % (ref 0–1)
BILIRUB SERPL-MCNC: 0.6 MG/DL (ref 0.2–1)
BUN SERPL-MCNC: 32 MG/DL (ref 5–25)
CALCIUM SERPL-MCNC: 8.8 MG/DL (ref 8.3–10.1)
CHLORIDE SERPL-SCNC: 108 MMOL/L (ref 100–108)
CO2 SERPL-SCNC: 29 MMOL/L (ref 21–32)
CREAT SERPL-MCNC: 1.85 MG/DL (ref 0.6–1.3)
EOSINOPHIL # BLD AUTO: 0.19 THOUSAND/ΜL (ref 0–0.61)
EOSINOPHIL NFR BLD AUTO: 3 % (ref 0–6)
ERYTHROCYTE [DISTWIDTH] IN BLOOD BY AUTOMATED COUNT: 13.8 % (ref 11.6–15.1)
GFR SERPL CREATININE-BSD FRML MDRD: 34 ML/MIN/1.73SQ M
GLUCOSE P FAST SERPL-MCNC: 91 MG/DL (ref 65–99)
HCT VFR BLD AUTO: 35.2 % (ref 36.5–49.3)
HGB BLD-MCNC: 11 G/DL (ref 12–17)
IMM GRANULOCYTES # BLD AUTO: 0.01 THOUSAND/UL (ref 0–0.2)
IMM GRANULOCYTES NFR BLD AUTO: 0 % (ref 0–2)
LYMPHOCYTES # BLD AUTO: 1.25 THOUSANDS/ΜL (ref 0.6–4.47)
LYMPHOCYTES NFR BLD AUTO: 21 % (ref 14–44)
MCH RBC QN AUTO: 29.9 PG (ref 26.8–34.3)
MCHC RBC AUTO-ENTMCNC: 31.3 G/DL (ref 31.4–37.4)
MCV RBC AUTO: 96 FL (ref 82–98)
MONOCYTES # BLD AUTO: 0.44 THOUSAND/ΜL (ref 0.17–1.22)
MONOCYTES NFR BLD AUTO: 7 % (ref 4–12)
NEUTROPHILS # BLD AUTO: 3.98 THOUSANDS/ΜL (ref 1.85–7.62)
NEUTS SEG NFR BLD AUTO: 67 % (ref 43–75)
NRBC BLD AUTO-RTO: 0 /100 WBCS
PLATELET # BLD AUTO: 137 THOUSANDS/UL (ref 149–390)
PMV BLD AUTO: 12.1 FL (ref 8.9–12.7)
POTASSIUM SERPL-SCNC: 4.3 MMOL/L (ref 3.5–5.3)
PROT SERPL-MCNC: 7.1 G/DL (ref 6.4–8.2)
RBC # BLD AUTO: 3.68 MILLION/UL (ref 3.88–5.62)
SODIUM SERPL-SCNC: 141 MMOL/L (ref 136–145)
WBC # BLD AUTO: 5.96 THOUSAND/UL (ref 4.31–10.16)

## 2019-03-26 PROCEDURE — 85025 COMPLETE CBC W/AUTO DIFF WBC: CPT

## 2019-03-26 PROCEDURE — 86316 IMMUNOASSAY TUMOR OTHER: CPT

## 2019-03-26 PROCEDURE — 36415 COLL VENOUS BLD VENIPUNCTURE: CPT

## 2019-03-26 PROCEDURE — 80053 COMPREHEN METABOLIC PANEL: CPT

## 2019-03-29 LAB — CGA SERPL-SCNC: 278 NMOL/L (ref 0–5)

## 2019-04-01 ENCOUNTER — APPOINTMENT (OUTPATIENT)
Dept: CT IMAGING | Facility: HOSPITAL | Age: 79
End: 2019-04-01
Payer: COMMERCIAL

## 2019-04-01 ENCOUNTER — APPOINTMENT (EMERGENCY)
Dept: RADIOLOGY | Facility: HOSPITAL | Age: 79
End: 2019-04-01
Payer: COMMERCIAL

## 2019-04-01 ENCOUNTER — APPOINTMENT (OUTPATIENT)
Dept: NON INVASIVE DIAGNOSTICS | Facility: HOSPITAL | Age: 79
End: 2019-04-01
Payer: COMMERCIAL

## 2019-04-01 ENCOUNTER — APPOINTMENT (OUTPATIENT)
Dept: RADIOLOGY | Facility: HOSPITAL | Age: 79
End: 2019-04-01
Payer: COMMERCIAL

## 2019-04-01 ENCOUNTER — HOSPITAL ENCOUNTER (OUTPATIENT)
Facility: HOSPITAL | Age: 79
Setting detail: OBSERVATION
Discharge: NURSING, REHAB OR LONG-TERM CARE | End: 2019-04-04
Attending: EMERGENCY MEDICINE | Admitting: INTERNAL MEDICINE
Payer: COMMERCIAL

## 2019-04-01 DIAGNOSIS — N18.9 CHRONIC KIDNEY DISEASE: ICD-10-CM

## 2019-04-01 DIAGNOSIS — R55 SYNCOPE: Primary | ICD-10-CM

## 2019-04-01 DIAGNOSIS — M25.462 SWELLING OF JOINT OF LEFT KNEE: ICD-10-CM

## 2019-04-01 DIAGNOSIS — M25.562 ACUTE PAIN OF LEFT KNEE: ICD-10-CM

## 2019-04-01 DIAGNOSIS — L89.610 PRESSURE INJURY OF RIGHT HEEL, UNSTAGEABLE (HCC): ICD-10-CM

## 2019-04-01 PROBLEM — N18.30 CKD (CHRONIC KIDNEY DISEASE) STAGE 3, GFR 30-59 ML/MIN (HCC): Chronic | Status: ACTIVE | Noted: 2019-04-01

## 2019-04-01 LAB
ALBUMIN SERPL BCP-MCNC: 4.1 G/DL (ref 3.5–5.7)
ALP SERPL-CCNC: 75 U/L (ref 55–165)
ALT SERPL W P-5'-P-CCNC: 11 U/L (ref 7–52)
ANION GAP SERPL CALCULATED.3IONS-SCNC: 10 MMOL/L (ref 4–13)
APTT PPP: 31 SECONDS (ref 26–38)
AST SERPL W P-5'-P-CCNC: 12 U/L (ref 13–39)
ATRIAL RATE: 83 BPM
BASOPHILS # BLD AUTO: 0 THOUSANDS/ΜL (ref 0–0.1)
BASOPHILS NFR BLD AUTO: 0 % (ref 0–2)
BILIRUB SERPL-MCNC: 0.8 MG/DL (ref 0.2–1)
BNP SERPL-MCNC: 191 PG/ML (ref 1–100)
BUN SERPL-MCNC: 27 MG/DL (ref 7–25)
CALCIUM SERPL-MCNC: 9.3 MG/DL (ref 8.6–10.5)
CHLORIDE SERPL-SCNC: 103 MMOL/L (ref 98–107)
CO2 SERPL-SCNC: 27 MMOL/L (ref 21–31)
CREAT SERPL-MCNC: 1.58 MG/DL (ref 0.7–1.3)
EOSINOPHIL # BLD AUTO: 0 THOUSAND/ΜL (ref 0–0.61)
EOSINOPHIL NFR BLD AUTO: 0 % (ref 0–5)
ERYTHROCYTE [DISTWIDTH] IN BLOOD BY AUTOMATED COUNT: 13.9 % (ref 11.5–14.5)
GFR SERPL CREATININE-BSD FRML MDRD: 41 ML/MIN/1.73SQ M
GLUCOSE SERPL-MCNC: 149 MG/DL (ref 65–99)
GLUCOSE SERPL-MCNC: 156 MG/DL (ref 65–140)
GLUCOSE SERPL-MCNC: 236 MG/DL (ref 65–140)
HCT VFR BLD AUTO: 35.8 % (ref 42–47)
HGB BLD-MCNC: 11.8 G/DL (ref 14–18)
INR PPP: 1.12 (ref 0.9–1.5)
LYMPHOCYTES # BLD AUTO: 0.4 THOUSANDS/ΜL (ref 0.6–4.47)
LYMPHOCYTES NFR BLD AUTO: 5 % (ref 21–51)
MCH RBC QN AUTO: 30.5 PG (ref 26–34)
MCHC RBC AUTO-ENTMCNC: 33.1 G/DL (ref 31–37)
MCV RBC AUTO: 92 FL (ref 81–99)
MONOCYTES # BLD AUTO: 0.7 THOUSAND/ΜL (ref 0.17–1.22)
MONOCYTES NFR BLD AUTO: 8 % (ref 2–12)
NEUTROPHILS # BLD AUTO: 8.4 THOUSANDS/ΜL (ref 1.4–6.5)
NEUTS SEG NFR BLD AUTO: 87 % (ref 42–75)
P AXIS: 54 DEGREES
PLATELET # BLD AUTO: 120 THOUSANDS/UL (ref 149–390)
PLATELET # BLD AUTO: 124 THOUSANDS/UL (ref 149–390)
PMV BLD AUTO: 9.9 FL (ref 8.6–11.7)
POTASSIUM SERPL-SCNC: 3.9 MMOL/L (ref 3.5–5.5)
PR INTERVAL: 166 MS
PROT SERPL-MCNC: 7.6 G/DL (ref 6.4–8.9)
PROTHROMBIN TIME: 13 SECONDS (ref 10.2–13)
QRS AXIS: -52 DEGREES
QRSD INTERVAL: 98 MS
QT INTERVAL: 384 MS
QTC INTERVAL: 451 MS
RBC # BLD AUTO: 3.88 MILLION/UL (ref 4.3–5.9)
SODIUM SERPL-SCNC: 140 MMOL/L (ref 134–143)
T WAVE AXIS: 83 DEGREES
TROPONIN I SERPL-MCNC: <0.03 NG/ML
TSH SERPL DL<=0.05 MIU/L-ACNC: 0.98 UIU/ML (ref 0.45–5.33)
VENTRICULAR RATE: 83 BPM
WBC # BLD AUTO: 9.7 THOUSAND/UL (ref 4.8–10.8)

## 2019-04-01 PROCEDURE — 85730 THROMBOPLASTIN TIME PARTIAL: CPT | Performed by: EMERGENCY MEDICINE

## 2019-04-01 PROCEDURE — 93321 DOPPLER ECHO F-UP/LMTD STD: CPT | Performed by: INTERNAL MEDICINE

## 2019-04-01 PROCEDURE — 99285 EMERGENCY DEPT VISIT HI MDM: CPT

## 2019-04-01 PROCEDURE — 85025 COMPLETE CBC W/AUTO DIFF WBC: CPT | Performed by: EMERGENCY MEDICINE

## 2019-04-01 PROCEDURE — 83880 ASSAY OF NATRIURETIC PEPTIDE: CPT | Performed by: EMERGENCY MEDICINE

## 2019-04-01 PROCEDURE — 99219 PR INITIAL OBSERVATION CARE/DAY 50 MINUTES: CPT | Performed by: INTERNAL MEDICINE

## 2019-04-01 PROCEDURE — 70450 CT HEAD/BRAIN W/O DYE: CPT

## 2019-04-01 PROCEDURE — 85610 PROTHROMBIN TIME: CPT | Performed by: EMERGENCY MEDICINE

## 2019-04-01 PROCEDURE — 82948 REAGENT STRIP/BLOOD GLUCOSE: CPT

## 2019-04-01 PROCEDURE — 93308 TTE F-UP OR LMTD: CPT | Performed by: INTERNAL MEDICINE

## 2019-04-01 PROCEDURE — 93005 ELECTROCARDIOGRAM TRACING: CPT

## 2019-04-01 PROCEDURE — 71045 X-RAY EXAM CHEST 1 VIEW: CPT

## 2019-04-01 PROCEDURE — 85049 AUTOMATED PLATELET COUNT: CPT | Performed by: INTERNAL MEDICINE

## 2019-04-01 PROCEDURE — 84484 ASSAY OF TROPONIN QUANT: CPT | Performed by: EMERGENCY MEDICINE

## 2019-04-01 PROCEDURE — 36415 COLL VENOUS BLD VENIPUNCTURE: CPT | Performed by: EMERGENCY MEDICINE

## 2019-04-01 PROCEDURE — 84484 ASSAY OF TROPONIN QUANT: CPT | Performed by: INTERNAL MEDICINE

## 2019-04-01 PROCEDURE — 93010 ELECTROCARDIOGRAM REPORT: CPT | Performed by: INTERNAL MEDICINE

## 2019-04-01 PROCEDURE — 73564 X-RAY EXAM KNEE 4 OR MORE: CPT

## 2019-04-01 PROCEDURE — 84443 ASSAY THYROID STIM HORMONE: CPT | Performed by: INTERNAL MEDICINE

## 2019-04-01 PROCEDURE — 80053 COMPREHEN METABOLIC PANEL: CPT | Performed by: EMERGENCY MEDICINE

## 2019-04-01 PROCEDURE — 93308 TTE F-UP OR LMTD: CPT

## 2019-04-01 PROCEDURE — 93325 DOPPLER ECHO COLOR FLOW MAPG: CPT | Performed by: INTERNAL MEDICINE

## 2019-04-01 RX ORDER — SIMETHICONE 80 MG
80 TABLET,CHEWABLE ORAL EVERY 6 HOURS PRN
Status: DISCONTINUED | OUTPATIENT
Start: 2019-04-01 | End: 2019-04-04 | Stop reason: HOSPADM

## 2019-04-01 RX ORDER — ASPIRIN 81 MG/1
81 TABLET, CHEWABLE ORAL DAILY
Status: DISCONTINUED | OUTPATIENT
Start: 2019-04-01 | End: 2019-04-04 | Stop reason: HOSPADM

## 2019-04-01 RX ORDER — ACETAMINOPHEN 325 MG/1
650 TABLET ORAL ONCE
Status: COMPLETED | OUTPATIENT
Start: 2019-04-01 | End: 2019-04-01

## 2019-04-01 RX ORDER — TRAMADOL HYDROCHLORIDE 50 MG/1
50 TABLET ORAL ONCE
Status: COMPLETED | OUTPATIENT
Start: 2019-04-01 | End: 2019-04-01

## 2019-04-01 RX ORDER — AMLODIPINE BESYLATE 5 MG/1
5 TABLET ORAL DAILY
Status: DISCONTINUED | OUTPATIENT
Start: 2019-04-01 | End: 2019-04-04 | Stop reason: HOSPADM

## 2019-04-01 RX ORDER — PANTOPRAZOLE SODIUM 40 MG/1
40 TABLET, DELAYED RELEASE ORAL
Status: DISCONTINUED | OUTPATIENT
Start: 2019-04-02 | End: 2019-04-04 | Stop reason: HOSPADM

## 2019-04-01 RX ORDER — HEPARIN SODIUM 5000 [USP'U]/ML
5000 INJECTION, SOLUTION INTRAVENOUS; SUBCUTANEOUS EVERY 8 HOURS SCHEDULED
Status: DISCONTINUED | OUTPATIENT
Start: 2019-04-01 | End: 2019-04-04 | Stop reason: HOSPADM

## 2019-04-01 RX ORDER — TAMSULOSIN HYDROCHLORIDE 0.4 MG/1
0.4 CAPSULE ORAL
Status: DISCONTINUED | OUTPATIENT
Start: 2019-04-01 | End: 2019-04-04 | Stop reason: HOSPADM

## 2019-04-01 RX ADMIN — INSULIN DETEMIR 10 UNITS: 100 INJECTION, SOLUTION SUBCUTANEOUS at 21:29

## 2019-04-01 RX ADMIN — AMLODIPINE BESYLATE 5 MG: 5 TABLET ORAL at 15:07

## 2019-04-01 RX ADMIN — TAMSULOSIN HYDROCHLORIDE 0.4 MG: 0.4 CAPSULE ORAL at 17:04

## 2019-04-01 RX ADMIN — INSULIN LISPRO 2 UNITS: 100 INJECTION, SOLUTION INTRAVENOUS; SUBCUTANEOUS at 21:29

## 2019-04-01 RX ADMIN — TRAMADOL HYDROCHLORIDE 50 MG: 50 TABLET, COATED ORAL at 15:07

## 2019-04-01 RX ADMIN — ACETAMINOPHEN 650 MG: 325 TABLET ORAL at 11:27

## 2019-04-01 RX ADMIN — ASPIRIN 81 MG 81 MG: 81 TABLET ORAL at 15:07

## 2019-04-01 RX ADMIN — HEPARIN SODIUM 5000 UNITS: 5000 INJECTION, SOLUTION INTRAVENOUS; SUBCUTANEOUS at 15:07

## 2019-04-01 RX ADMIN — HEPARIN SODIUM 5000 UNITS: 5000 INJECTION, SOLUTION INTRAVENOUS; SUBCUTANEOUS at 21:29

## 2019-04-01 RX ADMIN — INSULIN LISPRO 1 UNITS: 100 INJECTION, SOLUTION INTRAVENOUS; SUBCUTANEOUS at 17:04

## 2019-04-02 PROBLEM — Z79.4 TYPE 2 DIABETES MELLITUS WITH COMPLICATION, WITH LONG-TERM CURRENT USE OF INSULIN (HCC): Status: ACTIVE | Noted: 2019-01-23

## 2019-04-02 PROBLEM — E11.8 TYPE 2 DIABETES MELLITUS WITH COMPLICATION, WITH LONG-TERM CURRENT USE OF INSULIN (HCC): Status: ACTIVE | Noted: 2019-01-23

## 2019-04-02 PROBLEM — M25.562 ACUTE PAIN OF LEFT KNEE: Status: ACTIVE | Noted: 2019-04-02

## 2019-04-02 LAB
ALBUMIN SERPL BCP-MCNC: 3.5 G/DL (ref 3.5–5.7)
ALP SERPL-CCNC: 64 U/L (ref 55–165)
ALT SERPL W P-5'-P-CCNC: 9 U/L (ref 7–52)
ANION GAP SERPL CALCULATED.3IONS-SCNC: 7 MMOL/L (ref 4–13)
AST SERPL W P-5'-P-CCNC: 11 U/L (ref 13–39)
BASOPHILS # BLD AUTO: 0.1 THOUSANDS/ΜL (ref 0–0.1)
BASOPHILS NFR BLD AUTO: 1 % (ref 0–2)
BILIRUB SERPL-MCNC: 0.4 MG/DL (ref 0.2–1)
BUN SERPL-MCNC: 25 MG/DL (ref 7–25)
CALCIUM SERPL-MCNC: 9.1 MG/DL (ref 8.6–10.5)
CHLORIDE SERPL-SCNC: 103 MMOL/L (ref 98–107)
CO2 SERPL-SCNC: 28 MMOL/L (ref 21–31)
CREAT SERPL-MCNC: 1.47 MG/DL (ref 0.7–1.3)
EOSINOPHIL # BLD AUTO: 0.1 THOUSAND/ΜL (ref 0–0.61)
EOSINOPHIL NFR BLD AUTO: 3 % (ref 0–5)
ERYTHROCYTE [DISTWIDTH] IN BLOOD BY AUTOMATED COUNT: 13.9 % (ref 11.5–14.5)
GFR SERPL CREATININE-BSD FRML MDRD: 44 ML/MIN/1.73SQ M
GLUCOSE SERPL-MCNC: 111 MG/DL (ref 65–140)
GLUCOSE SERPL-MCNC: 126 MG/DL (ref 65–99)
GLUCOSE SERPL-MCNC: 180 MG/DL (ref 65–140)
GLUCOSE SERPL-MCNC: 225 MG/DL (ref 65–140)
GLUCOSE SERPL-MCNC: 258 MG/DL (ref 65–140)
HCT VFR BLD AUTO: 30.5 % (ref 42–47)
HGB BLD-MCNC: 10.3 G/DL (ref 14–18)
LG PLATELETS BLD QL SMEAR: PRESENT
LYMPHOCYTES # BLD AUTO: 0.9 THOUSANDS/ΜL (ref 0.6–4.47)
LYMPHOCYTES NFR BLD AUTO: 16 % (ref 21–51)
MAGNESIUM SERPL-MCNC: 1.6 MG/DL (ref 1.9–2.7)
MCH RBC QN AUTO: 31 PG (ref 26–34)
MCHC RBC AUTO-ENTMCNC: 33.7 G/DL (ref 31–37)
MCV RBC AUTO: 92 FL (ref 81–99)
MONOCYTES # BLD AUTO: 0.7 THOUSAND/ΜL (ref 0.17–1.22)
MONOCYTES NFR BLD AUTO: 13 % (ref 2–12)
NEUTROPHILS # BLD AUTO: 3.5 THOUSANDS/ΜL (ref 1.4–6.5)
NEUTS SEG NFR BLD AUTO: 67 % (ref 42–75)
PHOSPHATE SERPL-MCNC: 3.5 MG/DL (ref 3–5.5)
PLATELET # BLD AUTO: 108 THOUSANDS/UL (ref 149–390)
PLATELET BLD QL SMEAR: ADEQUATE
PMV BLD AUTO: 10.2 FL (ref 8.6–11.7)
POTASSIUM SERPL-SCNC: 4.4 MMOL/L (ref 3.5–5.5)
PROT SERPL-MCNC: 6.1 G/DL (ref 6.4–8.9)
RBC # BLD AUTO: 3.32 MILLION/UL (ref 4.3–5.9)
RBC MORPH BLD: NORMAL
SODIUM SERPL-SCNC: 138 MMOL/L (ref 134–143)
WBC # BLD AUTO: 5.3 THOUSAND/UL (ref 4.8–10.8)

## 2019-04-02 PROCEDURE — 82948 REAGENT STRIP/BLOOD GLUCOSE: CPT

## 2019-04-02 PROCEDURE — 80053 COMPREHEN METABOLIC PANEL: CPT | Performed by: INTERNAL MEDICINE

## 2019-04-02 PROCEDURE — 99225 PR SBSQ OBSERVATION CARE/DAY 25 MINUTES: CPT | Performed by: NURSE PRACTITIONER

## 2019-04-02 PROCEDURE — G8979 MOBILITY GOAL STATUS: HCPCS

## 2019-04-02 PROCEDURE — 85025 COMPLETE CBC W/AUTO DIFF WBC: CPT | Performed by: INTERNAL MEDICINE

## 2019-04-02 PROCEDURE — 84100 ASSAY OF PHOSPHORUS: CPT | Performed by: PHYSICIAN ASSISTANT

## 2019-04-02 PROCEDURE — 97166 OT EVAL MOD COMPLEX 45 MIN: CPT

## 2019-04-02 PROCEDURE — G8987 SELF CARE CURRENT STATUS: HCPCS

## 2019-04-02 PROCEDURE — G8988 SELF CARE GOAL STATUS: HCPCS

## 2019-04-02 PROCEDURE — G8978 MOBILITY CURRENT STATUS: HCPCS

## 2019-04-02 PROCEDURE — 83735 ASSAY OF MAGNESIUM: CPT | Performed by: PHYSICIAN ASSISTANT

## 2019-04-02 PROCEDURE — 97163 PT EVAL HIGH COMPLEX 45 MIN: CPT

## 2019-04-02 RX ORDER — LANREOTIDE ACETATE 120 MG/.5ML
120 INJECTION SUBCUTANEOUS ONCE
Status: DISCONTINUED | OUTPATIENT
Start: 2019-04-03 | End: 2019-04-06 | Stop reason: HOSPADM

## 2019-04-02 RX ORDER — HYDRALAZINE HYDROCHLORIDE 20 MG/ML
10 INJECTION INTRAMUSCULAR; INTRAVENOUS EVERY 6 HOURS PRN
Status: DISCONTINUED | OUTPATIENT
Start: 2019-04-02 | End: 2019-04-04 | Stop reason: HOSPADM

## 2019-04-02 RX ORDER — ONDANSETRON 2 MG/ML
4 INJECTION INTRAMUSCULAR; INTRAVENOUS EVERY 4 HOURS PRN
Status: DISCONTINUED | OUTPATIENT
Start: 2019-04-02 | End: 2019-04-04 | Stop reason: HOSPADM

## 2019-04-02 RX ORDER — ACETAMINOPHEN 325 MG/1
650 TABLET ORAL 4 TIMES DAILY PRN
Status: DISCONTINUED | OUTPATIENT
Start: 2019-04-02 | End: 2019-04-04 | Stop reason: HOSPADM

## 2019-04-02 RX ORDER — TRAMADOL HYDROCHLORIDE 50 MG/1
50 TABLET ORAL EVERY 6 HOURS PRN
Status: DISCONTINUED | OUTPATIENT
Start: 2019-04-02 | End: 2019-04-04 | Stop reason: HOSPADM

## 2019-04-02 RX ADMIN — INSULIN LISPRO 2 UNITS: 100 INJECTION, SOLUTION INTRAVENOUS; SUBCUTANEOUS at 21:39

## 2019-04-02 RX ADMIN — HEPARIN SODIUM 5000 UNITS: 5000 INJECTION, SOLUTION INTRAVENOUS; SUBCUTANEOUS at 21:39

## 2019-04-02 RX ADMIN — HEPARIN SODIUM 5000 UNITS: 5000 INJECTION, SOLUTION INTRAVENOUS; SUBCUTANEOUS at 13:57

## 2019-04-02 RX ADMIN — HEPARIN SODIUM 5000 UNITS: 5000 INJECTION, SOLUTION INTRAVENOUS; SUBCUTANEOUS at 05:27

## 2019-04-02 RX ADMIN — TAMSULOSIN HYDROCHLORIDE 0.4 MG: 0.4 CAPSULE ORAL at 16:26

## 2019-04-02 RX ADMIN — AMLODIPINE BESYLATE 5 MG: 5 TABLET ORAL at 08:23

## 2019-04-02 RX ADMIN — INSULIN DETEMIR 10 UNITS: 100 INJECTION, SOLUTION SUBCUTANEOUS at 21:39

## 2019-04-02 RX ADMIN — INSULIN LISPRO 2 UNITS: 100 INJECTION, SOLUTION INTRAVENOUS; SUBCUTANEOUS at 16:27

## 2019-04-02 RX ADMIN — TRAMADOL HYDROCHLORIDE 50 MG: 50 TABLET, COATED ORAL at 21:41

## 2019-04-02 RX ADMIN — INSULIN LISPRO 1 UNITS: 100 INJECTION, SOLUTION INTRAVENOUS; SUBCUTANEOUS at 11:45

## 2019-04-02 RX ADMIN — ASPIRIN 81 MG 81 MG: 81 TABLET ORAL at 08:24

## 2019-04-02 RX ADMIN — HYDRALAZINE HYDROCHLORIDE 10 MG: 20 INJECTION INTRAMUSCULAR; INTRAVENOUS at 13:57

## 2019-04-02 RX ADMIN — SIMETHICONE CHEW TAB 80 MG 80 MG: 80 TABLET ORAL at 10:35

## 2019-04-02 RX ADMIN — PANTOPRAZOLE SODIUM 40 MG: 40 TABLET, DELAYED RELEASE ORAL at 05:27

## 2019-04-02 RX ADMIN — ACETAMINOPHEN 650 MG: 325 TABLET ORAL at 10:35

## 2019-04-03 ENCOUNTER — HOSPITAL ENCOUNTER (OUTPATIENT)
Dept: INFUSION CENTER | Facility: HOSPITAL | Age: 79
Discharge: HOME | End: 2019-04-03

## 2019-04-03 PROBLEM — L89.610 PRESSURE INJURY OF RIGHT HEEL, UNSTAGEABLE (HCC): Status: ACTIVE | Noted: 2019-04-03

## 2019-04-03 LAB
ANION GAP SERPL CALCULATED.3IONS-SCNC: 9 MMOL/L (ref 4–13)
BUN SERPL-MCNC: 27 MG/DL (ref 7–25)
CALCIUM SERPL-MCNC: 9.3 MG/DL (ref 8.6–10.5)
CHLORIDE SERPL-SCNC: 102 MMOL/L (ref 98–107)
CO2 SERPL-SCNC: 28 MMOL/L (ref 21–31)
CREAT SERPL-MCNC: 1.58 MG/DL (ref 0.7–1.3)
ERYTHROCYTE [DISTWIDTH] IN BLOOD BY AUTOMATED COUNT: 13.9 % (ref 11.5–14.5)
ERYTHROCYTE [SEDIMENTATION RATE] IN BLOOD: 76 MM/HOUR (ref 0–20)
GFR SERPL CREATININE-BSD FRML MDRD: 41 ML/MIN/1.73SQ M
GLUCOSE SERPL-MCNC: 108 MG/DL (ref 65–99)
GLUCOSE SERPL-MCNC: 113 MG/DL (ref 65–140)
GLUCOSE SERPL-MCNC: 213 MG/DL (ref 65–140)
GLUCOSE SERPL-MCNC: 275 MG/DL (ref 65–140)
GLUCOSE SERPL-MCNC: 286 MG/DL (ref 65–140)
GLUCOSE SERPL-MCNC: 292 MG/DL (ref 65–140)
HCT VFR BLD AUTO: 32.4 % (ref 42–47)
HGB BLD-MCNC: 10.9 G/DL (ref 14–18)
MCH RBC QN AUTO: 30.5 PG (ref 26–34)
MCHC RBC AUTO-ENTMCNC: 33.6 G/DL (ref 31–37)
MCV RBC AUTO: 91 FL (ref 81–99)
PLATELET # BLD AUTO: 135 THOUSANDS/UL (ref 149–390)
PMV BLD AUTO: 10.1 FL (ref 8.6–11.7)
POTASSIUM SERPL-SCNC: 4.1 MMOL/L (ref 3.5–5.5)
RBC # BLD AUTO: 3.56 MILLION/UL (ref 4.3–5.9)
SODIUM SERPL-SCNC: 139 MMOL/L (ref 134–143)
WBC # BLD AUTO: 5.7 THOUSAND/UL (ref 4.8–10.8)

## 2019-04-03 PROCEDURE — 80048 BASIC METABOLIC PNL TOTAL CA: CPT | Performed by: NURSE PRACTITIONER

## 2019-04-03 PROCEDURE — 97530 THERAPEUTIC ACTIVITIES: CPT

## 2019-04-03 PROCEDURE — 99225 PR SBSQ OBSERVATION CARE/DAY 25 MINUTES: CPT | Performed by: NURSE PRACTITIONER

## 2019-04-03 PROCEDURE — 82948 REAGENT STRIP/BLOOD GLUCOSE: CPT

## 2019-04-03 PROCEDURE — 85027 COMPLETE CBC AUTOMATED: CPT | Performed by: NURSE PRACTITIONER

## 2019-04-03 PROCEDURE — 85652 RBC SED RATE AUTOMATED: CPT | Performed by: NURSE PRACTITIONER

## 2019-04-03 RX ORDER — LANREOTIDE ACETATE 120 MG/.5ML
120 INJECTION SUBCUTANEOUS ONCE
Status: DISCONTINUED | OUTPATIENT
Start: 2019-04-04 | End: 2019-04-07 | Stop reason: HOSPADM

## 2019-04-03 RX ORDER — PREDNISONE 20 MG/1
40 TABLET ORAL DAILY
Status: DISCONTINUED | OUTPATIENT
Start: 2019-04-03 | End: 2019-04-04 | Stop reason: HOSPADM

## 2019-04-03 RX ADMIN — ASPIRIN 81 MG 81 MG: 81 TABLET ORAL at 08:10

## 2019-04-03 RX ADMIN — TAMSULOSIN HYDROCHLORIDE 0.4 MG: 0.4 CAPSULE ORAL at 16:36

## 2019-04-03 RX ADMIN — INSULIN LISPRO 2 UNITS: 100 INJECTION, SOLUTION INTRAVENOUS; SUBCUTANEOUS at 16:34

## 2019-04-03 RX ADMIN — INSULIN LISPRO 2 UNITS: 100 INJECTION, SOLUTION INTRAVENOUS; SUBCUTANEOUS at 21:20

## 2019-04-03 RX ADMIN — TRAMADOL HYDROCHLORIDE 50 MG: 50 TABLET, COATED ORAL at 08:10

## 2019-04-03 RX ADMIN — HEPARIN SODIUM 5000 UNITS: 5000 INJECTION, SOLUTION INTRAVENOUS; SUBCUTANEOUS at 17:27

## 2019-04-03 RX ADMIN — INSULIN LISPRO 3 UNITS: 100 INJECTION, SOLUTION INTRAVENOUS; SUBCUTANEOUS at 11:47

## 2019-04-03 RX ADMIN — AMLODIPINE BESYLATE 5 MG: 5 TABLET ORAL at 08:10

## 2019-04-03 RX ADMIN — TRAMADOL HYDROCHLORIDE 50 MG: 50 TABLET, COATED ORAL at 21:21

## 2019-04-03 RX ADMIN — HEPARIN SODIUM 5000 UNITS: 5000 INJECTION, SOLUTION INTRAVENOUS; SUBCUTANEOUS at 05:09

## 2019-04-03 RX ADMIN — PANTOPRAZOLE SODIUM 40 MG: 40 TABLET, DELAYED RELEASE ORAL at 05:09

## 2019-04-03 RX ADMIN — HEPARIN SODIUM 5000 UNITS: 5000 INJECTION, SOLUTION INTRAVENOUS; SUBCUTANEOUS at 21:21

## 2019-04-03 RX ADMIN — INSULIN DETEMIR 10 UNITS: 100 INJECTION, SOLUTION SUBCUTANEOUS at 21:21

## 2019-04-03 RX ADMIN — PREDNISONE 40 MG: 20 TABLET ORAL at 17:27

## 2019-04-04 ENCOUNTER — HOSPITAL ENCOUNTER (OUTPATIENT)
Dept: INFUSION CENTER | Facility: HOSPITAL | Age: 79
Discharge: HOME | End: 2019-04-04

## 2019-04-04 ENCOUNTER — HOSPITAL ENCOUNTER (INPATIENT)
Facility: HOSPITAL | Age: 79
LOS: 6 days | Discharge: HOME HEALTH | DRG: 948 | End: 2019-04-10
Attending: INTERNAL MEDICINE | Admitting: INTERNAL MEDICINE
Payer: COMMERCIAL

## 2019-04-04 VITALS
HEIGHT: 68 IN | BODY MASS INDEX: 24.43 KG/M2 | WEIGHT: 161.19 LBS | DIASTOLIC BLOOD PRESSURE: 68 MMHG | RESPIRATION RATE: 16 BRPM | OXYGEN SATURATION: 98 % | SYSTOLIC BLOOD PRESSURE: 137 MMHG | TEMPERATURE: 96.8 F | HEART RATE: 70 BPM

## 2019-04-04 DIAGNOSIS — Z79.4 TYPE 2 DIABETES MELLITUS WITH COMPLICATION, WITH LONG-TERM CURRENT USE OF INSULIN (HCC): Primary | ICD-10-CM

## 2019-04-04 DIAGNOSIS — E11.8 TYPE 2 DIABETES MELLITUS WITH COMPLICATION, WITH LONG-TERM CURRENT USE OF INSULIN (HCC): Primary | ICD-10-CM

## 2019-04-04 LAB
GLUCOSE SERPL-MCNC: 263 MG/DL (ref 65–140)
GLUCOSE SERPL-MCNC: 273 MG/DL (ref 65–140)
GLUCOSE SERPL-MCNC: 326 MG/DL (ref 65–140)
GLUCOSE SERPL-MCNC: 362 MG/DL (ref 65–140)
GLUCOSE SERPL-MCNC: 417 MG/DL (ref 65–140)

## 2019-04-04 PROCEDURE — 97116 GAIT TRAINING THERAPY: CPT

## 2019-04-04 PROCEDURE — 82948 REAGENT STRIP/BLOOD GLUCOSE: CPT

## 2019-04-04 PROCEDURE — 99217 PR OBSERVATION CARE DISCHARGE MANAGEMENT: CPT | Performed by: NURSE PRACTITIONER

## 2019-04-04 RX ORDER — PREDNISONE 10 MG/1
30 TABLET ORAL DAILY
Qty: 12 TABLET | Refills: 0 | Status: SHIPPED | OUTPATIENT
Start: 2019-04-04 | End: 2019-05-05 | Stop reason: HOSPADM

## 2019-04-04 RX ADMIN — INSULIN LISPRO 4 UNITS: 100 INJECTION, SOLUTION INTRAVENOUS; SUBCUTANEOUS at 11:26

## 2019-04-04 RX ADMIN — ASPIRIN 81 MG 81 MG: 81 TABLET ORAL at 08:13

## 2019-04-04 RX ADMIN — PANTOPRAZOLE SODIUM 40 MG: 40 TABLET, DELAYED RELEASE ORAL at 05:18

## 2019-04-04 RX ADMIN — PREDNISONE 40 MG: 20 TABLET ORAL at 08:13

## 2019-04-04 RX ADMIN — INSULIN LISPRO 3 UNITS: 100 INJECTION, SOLUTION INTRAVENOUS; SUBCUTANEOUS at 07:56

## 2019-04-04 RX ADMIN — HEPARIN SODIUM 5000 UNITS: 5000 INJECTION, SOLUTION INTRAVENOUS; SUBCUTANEOUS at 05:18

## 2019-04-04 RX ADMIN — AMLODIPINE BESYLATE 5 MG: 5 TABLET ORAL at 08:14

## 2019-04-05 LAB
EST. AVERAGE GLUCOSE BLD GHB EST-MCNC: 151 MG/DL
GLUCOSE SERPL-MCNC: 139 MG/DL (ref 65–140)
GLUCOSE SERPL-MCNC: 221 MG/DL (ref 65–140)
GLUCOSE SERPL-MCNC: 225 MG/DL (ref 65–140)
GLUCOSE SERPL-MCNC: 406 MG/DL (ref 65–140)
HBA1C MFR BLD: 6.9 % (ref 4.2–6.3)

## 2019-04-05 PROCEDURE — 82948 REAGENT STRIP/BLOOD GLUCOSE: CPT

## 2019-04-05 PROCEDURE — 83036 HEMOGLOBIN GLYCOSYLATED A1C: CPT | Performed by: INTERNAL MEDICINE

## 2019-04-06 LAB
GLUCOSE SERPL-MCNC: 177 MG/DL (ref 65–140)
GLUCOSE SERPL-MCNC: 234 MG/DL (ref 65–140)
GLUCOSE SERPL-MCNC: 398 MG/DL (ref 65–140)
GLUCOSE SERPL-MCNC: 443 MG/DL (ref 65–140)
GLUCOSE SERPL-MCNC: 97 MG/DL (ref 65–140)

## 2019-04-06 PROCEDURE — 82948 REAGENT STRIP/BLOOD GLUCOSE: CPT

## 2019-04-07 LAB
GLUCOSE SERPL-MCNC: 108 MG/DL (ref 65–140)
GLUCOSE SERPL-MCNC: 147 MG/DL (ref 65–140)
GLUCOSE SERPL-MCNC: 356 MG/DL (ref 65–140)
GLUCOSE SERPL-MCNC: 379 MG/DL (ref 65–140)

## 2019-04-07 PROCEDURE — 82948 REAGENT STRIP/BLOOD GLUCOSE: CPT

## 2019-04-08 LAB
GLUCOSE SERPL-MCNC: 125 MG/DL (ref 65–140)
GLUCOSE SERPL-MCNC: 252 MG/DL (ref 65–140)
GLUCOSE SERPL-MCNC: 387 MG/DL (ref 65–140)
GLUCOSE SERPL-MCNC: 47 MG/DL (ref 65–140)
GLUCOSE SERPL-MCNC: 80 MG/DL (ref 65–140)
GLUCOSE SERPL-MCNC: 92 MG/DL (ref 65–140)

## 2019-04-08 PROCEDURE — 82948 REAGENT STRIP/BLOOD GLUCOSE: CPT

## 2019-04-09 PROCEDURE — 82948 REAGENT STRIP/BLOOD GLUCOSE: CPT

## 2019-04-10 LAB
GLUCOSE SERPL-MCNC: 112 MG/DL (ref 65–140)
GLUCOSE SERPL-MCNC: 212 MG/DL (ref 65–140)
GLUCOSE SERPL-MCNC: 225 MG/DL (ref 65–140)
GLUCOSE SERPL-MCNC: 238 MG/DL (ref 65–140)
GLUCOSE SERPL-MCNC: 289 MG/DL (ref 65–140)
GLUCOSE SERPL-MCNC: 99 MG/DL (ref 65–140)

## 2019-04-10 PROCEDURE — 82948 REAGENT STRIP/BLOOD GLUCOSE: CPT

## 2019-04-12 RX ORDER — LANREOTIDE ACETATE 120 MG/.5ML
120 INJECTION SUBCUTANEOUS ONCE
Status: COMPLETED | OUTPATIENT
Start: 2019-04-15 | End: 2019-04-15

## 2019-04-15 ENCOUNTER — HOSPITAL ENCOUNTER (OUTPATIENT)
Dept: INFUSION CENTER | Facility: HOSPITAL | Age: 79
Discharge: HOME | End: 2019-04-15
Payer: COMMERCIAL

## 2019-04-15 VITALS
HEART RATE: 68 BPM | SYSTOLIC BLOOD PRESSURE: 160 MMHG | OXYGEN SATURATION: 97 % | RESPIRATION RATE: 18 BRPM | DIASTOLIC BLOOD PRESSURE: 70 MMHG | TEMPERATURE: 97.2 F

## 2019-04-15 PROCEDURE — 96372 THER/PROPH/DIAG INJ SC/IM: CPT

## 2019-04-15 RX ADMIN — LANREOTIDE ACETATE 120 MG: 120 INJECTION SUBCUTANEOUS at 13:11

## 2019-04-15 NOTE — PLAN OF CARE
Problem: INFECTION - ADULT  Goal: Absence of fever/infection during neutropenic period  Description  INTERVENTIONS:  - Monitor WBC  - Monitor signs and symptoms of infection  Outcome: Progressing

## 2019-04-16 ENCOUNTER — CONSULT (OUTPATIENT)
Dept: VASCULAR SURGERY | Facility: CLINIC | Age: 79
End: 2019-04-16
Payer: COMMERCIAL

## 2019-04-16 VITALS
SYSTOLIC BLOOD PRESSURE: 220 MMHG | HEART RATE: 65 BPM | WEIGHT: 167 LBS | TEMPERATURE: 96.4 F | BODY MASS INDEX: 25.31 KG/M2 | DIASTOLIC BLOOD PRESSURE: 82 MMHG | HEIGHT: 68 IN

## 2019-04-16 DIAGNOSIS — L97.909 ATHEROSCLEROSIS OF ARTERY OF EXTREMITY WITH ULCERATION (HCC): Primary | ICD-10-CM

## 2019-04-16 DIAGNOSIS — I70.299 ATHEROSCLEROSIS OF ARTERY OF EXTREMITY WITH ULCERATION (HCC): Primary | ICD-10-CM

## 2019-04-16 PROCEDURE — 99204 OFFICE O/P NEW MOD 45 MIN: CPT | Performed by: SURGERY

## 2019-04-22 DIAGNOSIS — C7B.8 NEUROENDOCRINE CARCINOMA METASTATIC TO LIVER (HCC): ICD-10-CM

## 2019-04-22 DIAGNOSIS — D3A.8 NEUROENDOCRINE TUMOR: ICD-10-CM

## 2019-04-22 DIAGNOSIS — C7A.8 NEUROENDOCRINE CARCINOMA METASTATIC TO LIVER (HCC): ICD-10-CM

## 2019-04-22 DIAGNOSIS — E34.0 CARCINOID SYNDROME (HCC): ICD-10-CM

## 2019-04-22 RX ORDER — LANREOTIDE ACETATE 120 MG/.5ML
120 INJECTION SUBCUTANEOUS ONCE
Status: CANCELLED | OUTPATIENT
Start: 2019-06-10

## 2019-04-28 ENCOUNTER — APPOINTMENT (EMERGENCY)
Dept: CT IMAGING | Facility: HOSPITAL | Age: 79
DRG: 872 | End: 2019-04-28
Payer: COMMERCIAL

## 2019-04-28 ENCOUNTER — HOSPITAL ENCOUNTER (EMERGENCY)
Facility: HOSPITAL | Age: 79
Discharge: HOME | DRG: 872 | End: 2019-04-28
Payer: COMMERCIAL

## 2019-04-28 VITALS
HEART RATE: 91 BPM | SYSTOLIC BLOOD PRESSURE: 159 MMHG | DIASTOLIC BLOOD PRESSURE: 71 MMHG | HEIGHT: 68 IN | WEIGHT: 160 LBS | BODY MASS INDEX: 24.25 KG/M2 | TEMPERATURE: 98.5 F | RESPIRATION RATE: 25 BRPM | OXYGEN SATURATION: 96 %

## 2019-04-28 DIAGNOSIS — R11.10 VOMITING: ICD-10-CM

## 2019-04-28 DIAGNOSIS — R19.7 DIARRHEA, UNSPECIFIED TYPE: ICD-10-CM

## 2019-04-28 DIAGNOSIS — R10.9 ABDOMINAL PAIN: ICD-10-CM

## 2019-04-28 DIAGNOSIS — R10.84 GENERALIZED ABDOMINAL PAIN: Primary | ICD-10-CM

## 2019-04-28 LAB
ALBUMIN SERPL BCP-MCNC: 3.7 G/DL (ref 3.5–5.7)
ALP SERPL-CCNC: 71 U/L (ref 55–165)
ALT SERPL W P-5'-P-CCNC: 8 U/L (ref 7–52)
ANION GAP SERPL CALCULATED.3IONS-SCNC: 11 MMOL/L (ref 4–13)
APTT PPP: 26 SECONDS (ref 26–38)
AST SERPL W P-5'-P-CCNC: 9 U/L (ref 13–39)
BILIRUB SERPL-MCNC: 0.8 MG/DL (ref 0.2–1)
BUN SERPL-MCNC: 28 MG/DL (ref 7–25)
CALCIUM SERPL-MCNC: 8.6 MG/DL (ref 8.6–10.5)
CHLORIDE SERPL-SCNC: 103 MMOL/L (ref 98–107)
CO2 SERPL-SCNC: 23 MMOL/L (ref 21–31)
CREAT SERPL-MCNC: 1.74 MG/DL (ref 0.7–1.3)
ERYTHROCYTE [DISTWIDTH] IN BLOOD BY AUTOMATED COUNT: 14 % (ref 11.5–14.5)
GFR SERPL CREATININE-BSD FRML MDRD: 36 ML/MIN/1.73SQ M
GLUCOSE SERPL-MCNC: 204 MG/DL (ref 65–140)
GLUCOSE SERPL-MCNC: 246 MG/DL (ref 65–99)
HCT VFR BLD AUTO: 34 % (ref 42–47)
HGB BLD-MCNC: 11.4 G/DL (ref 14–18)
INR PPP: 1.22 (ref 0.9–1.5)
LACTATE SERPL-SCNC: 1.8 MMOL/L (ref 0.5–2)
LIPASE SERPL-CCNC: 31 U/L (ref 11–82)
LYMPHOCYTES # BLD AUTO: 1 THOUSAND/UL (ref 0.6–4.47)
LYMPHOCYTES # BLD AUTO: 5 % (ref 20–51)
MCH RBC QN AUTO: 29.3 PG (ref 26–34)
MCHC RBC AUTO-ENTMCNC: 33.5 G/DL (ref 31–37)
MCV RBC AUTO: 87 FL (ref 81–99)
MONOCYTES # BLD AUTO: 1.39 THOUSAND/UL (ref 0–1.22)
MONOCYTES NFR BLD AUTO: 7 % (ref 4–12)
NEUTS BAND NFR BLD MANUAL: 10 % (ref 0–8)
NEUTS SEG # BLD: 17.51 THOUSAND/UL (ref 1.81–6.82)
NEUTS SEG NFR BLD AUTO: 78 % (ref 42–75)
PLATELET # BLD AUTO: 121 THOUSANDS/UL (ref 149–390)
PLATELET BLD QL SMEAR: ABNORMAL
PMV BLD AUTO: 9.4 FL (ref 8.6–11.7)
POTASSIUM SERPL-SCNC: 3.7 MMOL/L (ref 3.5–5.5)
PROT SERPL-MCNC: 6.4 G/DL (ref 6.4–8.9)
PROTHROMBIN TIME: 14.1 SECONDS (ref 10.2–13)
RBC # BLD AUTO: 3.89 MILLION/UL (ref 4.3–5.9)
RBC MORPH BLD: NORMAL
SODIUM SERPL-SCNC: 137 MMOL/L (ref 134–143)
TOTAL CELLS COUNTED SPEC: 100
TROPONIN I SERPL-MCNC: 0.03 NG/ML
WBC # BLD AUTO: 19.9 THOUSAND/UL (ref 4.8–10.8)

## 2019-04-28 PROCEDURE — 71250 CT THORAX DX C-: CPT

## 2019-04-28 PROCEDURE — 84443 ASSAY THYROID STIM HORMONE: CPT | Performed by: INTERNAL MEDICINE

## 2019-04-28 PROCEDURE — 80053 COMPREHEN METABOLIC PANEL: CPT

## 2019-04-28 PROCEDURE — 82948 REAGENT STRIP/BLOOD GLUCOSE: CPT

## 2019-04-28 PROCEDURE — 87186 SC STD MICRODIL/AGAR DIL: CPT

## 2019-04-28 PROCEDURE — 87077 CULTURE AEROBIC IDENTIFY: CPT

## 2019-04-28 PROCEDURE — 84484 ASSAY OF TROPONIN QUANT: CPT

## 2019-04-28 PROCEDURE — 74176 CT ABD & PELVIS W/O CONTRAST: CPT

## 2019-04-28 PROCEDURE — 96361 HYDRATE IV INFUSION ADD-ON: CPT

## 2019-04-28 PROCEDURE — 85007 BL SMEAR W/DIFF WBC COUNT: CPT

## 2019-04-28 PROCEDURE — 83690 ASSAY OF LIPASE: CPT

## 2019-04-28 PROCEDURE — 93005 ELECTROCARDIOGRAM TRACING: CPT

## 2019-04-28 PROCEDURE — 85027 COMPLETE CBC AUTOMATED: CPT

## 2019-04-28 PROCEDURE — 96374 THER/PROPH/DIAG INJ IV PUSH: CPT

## 2019-04-28 PROCEDURE — 87040 BLOOD CULTURE FOR BACTERIA: CPT

## 2019-04-28 PROCEDURE — 99285 EMERGENCY DEPT VISIT HI MDM: CPT

## 2019-04-28 PROCEDURE — 36415 COLL VENOUS BLD VENIPUNCTURE: CPT

## 2019-04-28 PROCEDURE — 85730 THROMBOPLASTIN TIME PARTIAL: CPT

## 2019-04-28 PROCEDURE — 85610 PROTHROMBIN TIME: CPT

## 2019-04-28 PROCEDURE — 83605 ASSAY OF LACTIC ACID: CPT

## 2019-04-28 RX ORDER — ONDANSETRON 2 MG/ML
4 INJECTION INTRAMUSCULAR; INTRAVENOUS ONCE
Status: COMPLETED | OUTPATIENT
Start: 2019-04-28 | End: 2019-04-28

## 2019-04-28 RX ORDER — ONDANSETRON 4 MG/1
4 TABLET, FILM COATED ORAL EVERY 8 HOURS PRN
Qty: 12 TABLET | Refills: 0 | Status: SHIPPED | OUTPATIENT
Start: 2019-04-28 | End: 2019-07-07 | Stop reason: HOSPADM

## 2019-04-28 RX ADMIN — SODIUM CHLORIDE 1000 ML: 0.9 INJECTION, SOLUTION INTRAVENOUS at 17:55

## 2019-04-28 RX ADMIN — SODIUM CHLORIDE 1000 ML: 0.9 INJECTION, SOLUTION INTRAVENOUS at 18:36

## 2019-04-28 RX ADMIN — ONDANSETRON 4 MG: 2 INJECTION INTRAMUSCULAR; INTRAVENOUS at 17:55

## 2019-04-29 ENCOUNTER — HOSPITAL ENCOUNTER (INPATIENT)
Facility: HOSPITAL | Age: 79
LOS: 6 days | Discharge: NURSING, REHAB OR LONG-TERM CARE | DRG: 872 | End: 2019-05-05
Attending: EMERGENCY MEDICINE | Admitting: INTERNAL MEDICINE
Payer: COMMERCIAL

## 2019-04-29 DIAGNOSIS — R78.81 GRAM-NEGATIVE BACTEREMIA: ICD-10-CM

## 2019-04-29 DIAGNOSIS — A41.9 SEPSIS (HCC): Primary | ICD-10-CM

## 2019-04-29 DIAGNOSIS — I10 ESSENTIAL HYPERTENSION: ICD-10-CM

## 2019-04-29 DIAGNOSIS — R78.81 BACTEREMIA: ICD-10-CM

## 2019-04-29 LAB
ATRIAL RATE: 92 BPM
BACTERIA UR QL AUTO: ABNORMAL /HPF
BILIRUB UR QL STRIP: NEGATIVE
CLARITY UR: ABNORMAL
COARSE GRAN CASTS URNS QL MICRO: ABNORMAL /LPF
COLOR UR: ABNORMAL
ERYTHROCYTE [DISTWIDTH] IN BLOOD BY AUTOMATED COUNT: 15 % (ref 11.5–14.5)
GLUCOSE SERPL-MCNC: 160 MG/DL (ref 65–140)
GLUCOSE SERPL-MCNC: 80 MG/DL (ref 65–140)
GLUCOSE UR STRIP-MCNC: NEGATIVE MG/DL
HCT VFR BLD AUTO: 34.9 % (ref 42–47)
HGB BLD-MCNC: 11.4 G/DL (ref 14–18)
HGB UR QL STRIP.AUTO: ABNORMAL
KETONES UR STRIP-MCNC: NEGATIVE MG/DL
LACTATE SERPL-SCNC: 1.2 MMOL/L (ref 0.5–2)
LACTATE SERPL-SCNC: 3.5 MMOL/L (ref 0.5–2)
LEUKOCYTE ESTERASE UR QL STRIP: ABNORMAL
LYMPHOCYTES # BLD AUTO: 1.32 THOUSAND/UL (ref 0.6–4.47)
LYMPHOCYTES # BLD AUTO: 5 % (ref 20–51)
MCH RBC QN AUTO: 29.4 PG (ref 26–34)
MCHC RBC AUTO-ENTMCNC: 32.7 G/DL (ref 31–37)
MCV RBC AUTO: 90 FL (ref 81–99)
METAMYELOCYTES NFR BLD MANUAL: 5 % (ref 0–1)
MONOCYTES # BLD AUTO: 1.58 THOUSAND/UL (ref 0–1.22)
MONOCYTES NFR BLD AUTO: 6 % (ref 4–12)
NEUTS BAND NFR BLD MANUAL: 13 % (ref 0–8)
NEUTS SEG # BLD: 22.18 THOUSAND/UL (ref 1.81–6.82)
NEUTS SEG NFR BLD AUTO: 71 % (ref 42–75)
NITRITE UR QL STRIP: POSITIVE
NON-SQ EPI CELLS URNS QL MICRO: ABNORMAL /HPF
P AXIS: 74 DEGREES
PH UR STRIP.AUTO: 5.5 [PH]
PLATELET # BLD AUTO: 125 THOUSANDS/UL (ref 149–390)
PLATELET # BLD AUTO: 96 THOUSANDS/UL (ref 149–390)
PLATELET BLD QL SMEAR: ABNORMAL
PMV BLD AUTO: 10 FL (ref 8.6–11.7)
PMV BLD AUTO: 10.7 FL (ref 8.6–11.7)
PR INTERVAL: 148 MS
PROT UR STRIP-MCNC: ABNORMAL MG/DL
QRS AXIS: -58 DEGREES
QRSD INTERVAL: 92 MS
QT INTERVAL: 374 MS
QTC INTERVAL: 462 MS
RBC # BLD AUTO: 3.88 MILLION/UL (ref 4.3–5.9)
RBC #/AREA URNS AUTO: ABNORMAL /HPF
SP GR UR STRIP.AUTO: 1.02 (ref 1–1.03)
T WAVE AXIS: 94 DEGREES
TOTAL CELLS COUNTED SPEC: 100
TSH SERPL DL<=0.05 MIU/L-ACNC: 0.55 UIU/ML (ref 0.45–5.33)
UROBILINOGEN UR QL STRIP.AUTO: 0.2 E.U./DL
VENTRICULAR RATE: 92 BPM
WBC # BLD AUTO: 26.4 THOUSAND/UL (ref 4.8–10.8)
WBC #/AREA URNS AUTO: ABNORMAL /HPF

## 2019-04-29 PROCEDURE — 85027 COMPLETE CBC AUTOMATED: CPT | Performed by: PHYSICIAN ASSISTANT

## 2019-04-29 PROCEDURE — 96365 THER/PROPH/DIAG IV INF INIT: CPT

## 2019-04-29 PROCEDURE — 93010 ELECTROCARDIOGRAM REPORT: CPT | Performed by: INTERNAL MEDICINE

## 2019-04-29 PROCEDURE — 87040 BLOOD CULTURE FOR BACTERIA: CPT | Performed by: PHYSICIAN ASSISTANT

## 2019-04-29 PROCEDURE — 87186 SC STD MICRODIL/AGAR DIL: CPT | Performed by: PHYSICIAN ASSISTANT

## 2019-04-29 PROCEDURE — 99283 EMERGENCY DEPT VISIT LOW MDM: CPT

## 2019-04-29 PROCEDURE — 36415 COLL VENOUS BLD VENIPUNCTURE: CPT | Performed by: PHYSICIAN ASSISTANT

## 2019-04-29 PROCEDURE — 96367 TX/PROPH/DG ADDL SEQ IV INF: CPT

## 2019-04-29 PROCEDURE — 87086 URINE CULTURE/COLONY COUNT: CPT | Performed by: PHYSICIAN ASSISTANT

## 2019-04-29 PROCEDURE — 82948 REAGENT STRIP/BLOOD GLUCOSE: CPT

## 2019-04-29 PROCEDURE — 87077 CULTURE AEROBIC IDENTIFY: CPT | Performed by: PHYSICIAN ASSISTANT

## 2019-04-29 PROCEDURE — 87147 CULTURE TYPE IMMUNOLOGIC: CPT | Performed by: PHYSICIAN ASSISTANT

## 2019-04-29 PROCEDURE — 83605 ASSAY OF LACTIC ACID: CPT | Performed by: PHYSICIAN ASSISTANT

## 2019-04-29 PROCEDURE — 81001 URINALYSIS AUTO W/SCOPE: CPT | Performed by: PHYSICIAN ASSISTANT

## 2019-04-29 PROCEDURE — 85049 AUTOMATED PLATELET COUNT: CPT | Performed by: INTERNAL MEDICINE

## 2019-04-29 PROCEDURE — 85007 BL SMEAR W/DIFF WBC COUNT: CPT | Performed by: PHYSICIAN ASSISTANT

## 2019-04-29 PROCEDURE — 83605 ASSAY OF LACTIC ACID: CPT | Performed by: INTERNAL MEDICINE

## 2019-04-29 PROCEDURE — 99222 1ST HOSP IP/OBS MODERATE 55: CPT | Performed by: INTERNAL MEDICINE

## 2019-04-29 RX ORDER — SODIUM CHLORIDE 9 MG/ML
125 INJECTION, SOLUTION INTRAVENOUS CONTINUOUS
Status: DISCONTINUED | OUTPATIENT
Start: 2019-04-29 | End: 2019-05-01

## 2019-04-29 RX ORDER — PANTOPRAZOLE SODIUM 40 MG/1
40 TABLET, DELAYED RELEASE ORAL
Status: DISCONTINUED | OUTPATIENT
Start: 2019-04-30 | End: 2019-05-05 | Stop reason: HOSPADM

## 2019-04-29 RX ORDER — TAMSULOSIN HYDROCHLORIDE 0.4 MG/1
0.4 CAPSULE ORAL
Status: DISCONTINUED | OUTPATIENT
Start: 2019-04-29 | End: 2019-05-05 | Stop reason: HOSPADM

## 2019-04-29 RX ORDER — HEPARIN SODIUM 5000 [USP'U]/ML
5000 INJECTION, SOLUTION INTRAVENOUS; SUBCUTANEOUS EVERY 8 HOURS SCHEDULED
Status: DISCONTINUED | OUTPATIENT
Start: 2019-04-29 | End: 2019-05-05 | Stop reason: HOSPADM

## 2019-04-29 RX ORDER — CEFEPIME HYDROCHLORIDE 2 G/50ML
2000 INJECTION, SOLUTION INTRAVENOUS EVERY 12 HOURS
Status: DISCONTINUED | OUTPATIENT
Start: 2019-04-29 | End: 2019-04-30

## 2019-04-29 RX ORDER — CEFEPIME HYDROCHLORIDE 2 G/50ML
2000 INJECTION, SOLUTION INTRAVENOUS ONCE
Status: COMPLETED | OUTPATIENT
Start: 2019-04-29 | End: 2019-04-29

## 2019-04-29 RX ORDER — ACETAMINOPHEN 325 MG/1
650 TABLET ORAL EVERY 6 HOURS PRN
Status: DISCONTINUED | OUTPATIENT
Start: 2019-04-29 | End: 2019-05-05 | Stop reason: HOSPADM

## 2019-04-29 RX ORDER — ASPIRIN 81 MG/1
81 TABLET, CHEWABLE ORAL DAILY
Status: DISCONTINUED | OUTPATIENT
Start: 2019-04-29 | End: 2019-05-05 | Stop reason: HOSPADM

## 2019-04-29 RX ADMIN — ACETAMINOPHEN 650 MG: 325 TABLET ORAL at 14:51

## 2019-04-29 RX ADMIN — TAMSULOSIN HYDROCHLORIDE 0.4 MG: 0.4 CAPSULE ORAL at 17:56

## 2019-04-29 RX ADMIN — CEFEPIME HYDROCHLORIDE 2000 MG: 2 INJECTION, SOLUTION INTRAVENOUS at 23:25

## 2019-04-29 RX ADMIN — PIPERACILLIN SODIUM AND TAZOBACTAM SODIUM 3.38 G: 3; .375 INJECTION, POWDER, LYOPHILIZED, FOR SOLUTION INTRAVENOUS at 13:00

## 2019-04-29 RX ADMIN — CEFEPIME HYDROCHLORIDE 2000 MG: 2 INJECTION, SOLUTION INTRAVENOUS at 12:19

## 2019-04-29 RX ADMIN — HEPARIN SODIUM 5000 UNITS: 5000 INJECTION INTRAVENOUS; SUBCUTANEOUS at 21:37

## 2019-04-29 RX ADMIN — SODIUM CHLORIDE 125 ML/HR: 9 INJECTION, SOLUTION INTRAVENOUS at 14:49

## 2019-04-29 RX ADMIN — INSULIN DETEMIR 10 UNITS: 100 INJECTION, SOLUTION SUBCUTANEOUS at 21:37

## 2019-04-29 RX ADMIN — INSULIN LISPRO 1 UNITS: 100 INJECTION, SOLUTION INTRAVENOUS; SUBCUTANEOUS at 21:40

## 2019-04-29 RX ADMIN — SODIUM CHLORIDE 1000 ML: 0.9 INJECTION, SOLUTION INTRAVENOUS at 13:30

## 2019-04-29 RX ADMIN — HEPARIN SODIUM 5000 UNITS: 5000 INJECTION INTRAVENOUS; SUBCUTANEOUS at 15:20

## 2019-04-29 RX ADMIN — SODIUM CHLORIDE 1000 ML: 0.9 INJECTION, SOLUTION INTRAVENOUS at 12:27

## 2019-04-29 RX ADMIN — SODIUM CHLORIDE 125 ML/HR: 9 INJECTION, SOLUTION INTRAVENOUS at 23:24

## 2019-04-30 PROBLEM — I10 ESSENTIAL HYPERTENSION: Status: ACTIVE | Noted: 2019-01-23

## 2019-04-30 LAB
ALBUMIN SERPL BCP-MCNC: 2.9 G/DL (ref 3.5–5.7)
ALP SERPL-CCNC: 52 U/L (ref 55–165)
ALT SERPL W P-5'-P-CCNC: 10 U/L (ref 7–52)
ANION GAP SERPL CALCULATED.3IONS-SCNC: 9 MMOL/L (ref 4–13)
AST SERPL W P-5'-P-CCNC: 20 U/L (ref 13–39)
BILIRUB SERPL-MCNC: 0.5 MG/DL (ref 0.2–1)
BUN SERPL-MCNC: 39 MG/DL (ref 7–25)
CALCIUM SERPL-MCNC: 7.8 MG/DL (ref 8.6–10.5)
CHLORIDE SERPL-SCNC: 108 MMOL/L (ref 98–107)
CO2 SERPL-SCNC: 20 MMOL/L (ref 21–31)
CREAT SERPL-MCNC: 2.19 MG/DL (ref 0.7–1.3)
ERYTHROCYTE [DISTWIDTH] IN BLOOD BY AUTOMATED COUNT: 14.9 % (ref 11.5–14.5)
GFR SERPL CREATININE-BSD FRML MDRD: 27 ML/MIN/1.73SQ M
GLUCOSE SERPL-MCNC: 123 MG/DL (ref 65–140)
GLUCOSE SERPL-MCNC: 147 MG/DL (ref 65–140)
GLUCOSE SERPL-MCNC: 147 MG/DL (ref 65–99)
GLUCOSE SERPL-MCNC: 202 MG/DL (ref 65–140)
GLUCOSE SERPL-MCNC: 221 MG/DL (ref 65–140)
HCT VFR BLD AUTO: 28 % (ref 42–47)
HGB BLD-MCNC: 9.5 G/DL (ref 14–18)
INR PPP: 1.3 (ref 0.9–1.5)
LACTATE SERPL-SCNC: 1.6 MMOL/L (ref 0.5–2)
MCH RBC QN AUTO: 30 PG (ref 26–34)
MCHC RBC AUTO-ENTMCNC: 33.8 G/DL (ref 31–37)
MCV RBC AUTO: 89 FL (ref 81–99)
PLATELET # BLD AUTO: 90 THOUSANDS/UL (ref 149–390)
PLATELET BLD QL SMEAR: ABNORMAL
PMV BLD AUTO: 9.4 FL (ref 8.6–11.7)
POTASSIUM SERPL-SCNC: 4 MMOL/L (ref 3.5–5.5)
PROT SERPL-MCNC: 5.4 G/DL (ref 6.4–8.9)
PROTHROMBIN TIME: 15.1 SECONDS (ref 10.2–13)
RBC # BLD AUTO: 3.15 MILLION/UL (ref 4.3–5.9)
RBC MORPH BLD: NORMAL
SODIUM SERPL-SCNC: 137 MMOL/L (ref 134–143)
WBC # BLD AUTO: 13.5 THOUSAND/UL (ref 4.8–10.8)

## 2019-04-30 PROCEDURE — 83605 ASSAY OF LACTIC ACID: CPT | Performed by: INTERNAL MEDICINE

## 2019-04-30 PROCEDURE — 97163 PT EVAL HIGH COMPLEX 45 MIN: CPT

## 2019-04-30 PROCEDURE — 87077 CULTURE AEROBIC IDENTIFY: CPT | Performed by: INTERNAL MEDICINE

## 2019-04-30 PROCEDURE — G0427 INPT/ED TELECONSULT70: HCPCS | Performed by: INTERNAL MEDICINE

## 2019-04-30 PROCEDURE — 85027 COMPLETE CBC AUTOMATED: CPT | Performed by: INTERNAL MEDICINE

## 2019-04-30 PROCEDURE — 80053 COMPREHEN METABOLIC PANEL: CPT | Performed by: INTERNAL MEDICINE

## 2019-04-30 PROCEDURE — 99232 SBSQ HOSP IP/OBS MODERATE 35: CPT | Performed by: NURSE PRACTITIONER

## 2019-04-30 PROCEDURE — G8987 SELF CARE CURRENT STATUS: HCPCS

## 2019-04-30 PROCEDURE — G8988 SELF CARE GOAL STATUS: HCPCS

## 2019-04-30 PROCEDURE — 85610 PROTHROMBIN TIME: CPT | Performed by: INTERNAL MEDICINE

## 2019-04-30 PROCEDURE — G8979 MOBILITY GOAL STATUS: HCPCS

## 2019-04-30 PROCEDURE — G8978 MOBILITY CURRENT STATUS: HCPCS

## 2019-04-30 PROCEDURE — 97167 OT EVAL HIGH COMPLEX 60 MIN: CPT

## 2019-04-30 PROCEDURE — 82948 REAGENT STRIP/BLOOD GLUCOSE: CPT

## 2019-04-30 PROCEDURE — 87040 BLOOD CULTURE FOR BACTERIA: CPT | Performed by: INTERNAL MEDICINE

## 2019-04-30 RX ORDER — HYDROCODONE BITARTRATE AND ACETAMINOPHEN 5; 325 MG/1; MG/1
1 TABLET ORAL EVERY 6 HOURS PRN
Status: DISCONTINUED | OUTPATIENT
Start: 2019-04-30 | End: 2019-05-05 | Stop reason: HOSPADM

## 2019-04-30 RX ORDER — CEFEPIME HYDROCHLORIDE 1 G/50ML
1000 INJECTION, SOLUTION INTRAVENOUS EVERY 12 HOURS
Status: DISCONTINUED | OUTPATIENT
Start: 2019-04-30 | End: 2019-05-01

## 2019-04-30 RX ADMIN — PANTOPRAZOLE SODIUM 40 MG: 40 TABLET, DELAYED RELEASE ORAL at 05:47

## 2019-04-30 RX ADMIN — HEPARIN SODIUM 5000 UNITS: 5000 INJECTION INTRAVENOUS; SUBCUTANEOUS at 14:18

## 2019-04-30 RX ADMIN — ASPIRIN 81 MG 81 MG: 81 TABLET ORAL at 08:16

## 2019-04-30 RX ADMIN — CEFEPIME HYDROCHLORIDE 2000 MG: 2 INJECTION, SOLUTION INTRAVENOUS at 11:36

## 2019-04-30 RX ADMIN — ACETAMINOPHEN 650 MG: 325 TABLET ORAL at 01:36

## 2019-04-30 RX ADMIN — CEFEPIME HYDROCHLORIDE 1000 MG: 1 INJECTION, SOLUTION INTRAVENOUS at 23:30

## 2019-04-30 RX ADMIN — HEPARIN SODIUM 5000 UNITS: 5000 INJECTION INTRAVENOUS; SUBCUTANEOUS at 05:47

## 2019-04-30 RX ADMIN — INSULIN LISPRO 2 UNITS: 100 INJECTION, SOLUTION INTRAVENOUS; SUBCUTANEOUS at 21:31

## 2019-04-30 RX ADMIN — ACETAMINOPHEN 650 MG: 325 TABLET ORAL at 16:36

## 2019-04-30 RX ADMIN — HYDROCODONE BITARTRATE AND ACETAMINOPHEN 1 TABLET: 5; 325 TABLET ORAL at 06:06

## 2019-04-30 RX ADMIN — TAMSULOSIN HYDROCHLORIDE 0.4 MG: 0.4 CAPSULE ORAL at 16:36

## 2019-04-30 RX ADMIN — INSULIN LISPRO 2 UNITS: 100 INJECTION, SOLUTION INTRAVENOUS; SUBCUTANEOUS at 16:36

## 2019-04-30 RX ADMIN — SODIUM CHLORIDE 125 ML/HR: 9 INJECTION, SOLUTION INTRAVENOUS at 18:12

## 2019-04-30 RX ADMIN — SODIUM CHLORIDE 125 ML/HR: 9 INJECTION, SOLUTION INTRAVENOUS at 08:22

## 2019-04-30 RX ADMIN — HEPARIN SODIUM 5000 UNITS: 5000 INJECTION INTRAVENOUS; SUBCUTANEOUS at 21:30

## 2019-04-30 RX ADMIN — INSULIN DETEMIR 10 UNITS: 100 INJECTION, SOLUTION SUBCUTANEOUS at 21:31

## 2019-05-01 ENCOUNTER — HOSPITAL ENCOUNTER (OUTPATIENT)
Dept: INFUSION CENTER | Facility: HOSPITAL | Age: 79
End: 2019-05-01

## 2019-05-01 LAB
ANION GAP SERPL CALCULATED.3IONS-SCNC: 8 MMOL/L (ref 4–13)
BACTERIA BLD CULT: ABNORMAL
BACTERIA BLD CULT: ABNORMAL
BASOPHILS # BLD AUTO: 0 THOUSANDS/ΜL (ref 0–0.1)
BASOPHILS NFR BLD AUTO: 0 % (ref 0–2)
BUN SERPL-MCNC: 35 MG/DL (ref 7–25)
CALCIUM SERPL-MCNC: 7.8 MG/DL (ref 8.6–10.5)
CHLORIDE SERPL-SCNC: 107 MMOL/L (ref 98–107)
CO2 SERPL-SCNC: 19 MMOL/L (ref 21–31)
CREAT SERPL-MCNC: 2.13 MG/DL (ref 0.7–1.3)
EOSINOPHIL # BLD AUTO: 0 THOUSAND/ΜL (ref 0–0.61)
EOSINOPHIL NFR BLD AUTO: 0 % (ref 0–5)
ERYTHROCYTE [DISTWIDTH] IN BLOOD BY AUTOMATED COUNT: 14.8 % (ref 11.5–14.5)
GFR SERPL CREATININE-BSD FRML MDRD: 28 ML/MIN/1.73SQ M
GLUCOSE SERPL-MCNC: 106 MG/DL (ref 65–140)
GLUCOSE SERPL-MCNC: 115 MG/DL (ref 65–99)
GLUCOSE SERPL-MCNC: 118 MG/DL (ref 65–140)
GLUCOSE SERPL-MCNC: 136 MG/DL (ref 65–140)
GLUCOSE SERPL-MCNC: 137 MG/DL (ref 65–140)
GLUCOSE SERPL-MCNC: 170 MG/DL (ref 65–140)
GRAM STN SPEC: ABNORMAL
GRAM STN SPEC: ABNORMAL
HCT VFR BLD AUTO: 27.3 % (ref 42–47)
HGB BLD-MCNC: 9.3 G/DL (ref 14–18)
LYMPHOCYTES # BLD AUTO: 0.3 THOUSANDS/ΜL (ref 0.6–4.47)
LYMPHOCYTES NFR BLD AUTO: 5 % (ref 21–51)
MCH RBC QN AUTO: 30.3 PG (ref 26–34)
MCHC RBC AUTO-ENTMCNC: 34.1 G/DL (ref 31–37)
MCV RBC AUTO: 89 FL (ref 81–99)
MONOCYTES # BLD AUTO: 0.5 THOUSAND/ΜL (ref 0.17–1.22)
MONOCYTES NFR BLD AUTO: 7 % (ref 2–12)
NEUTROPHILS # BLD AUTO: 6.4 THOUSANDS/ΜL (ref 1.4–6.5)
NEUTS SEG NFR BLD AUTO: 88 % (ref 42–75)
PLATELET # BLD AUTO: 96 THOUSANDS/UL (ref 149–390)
PLATELET BLD QL SMEAR: ABNORMAL
PMV BLD AUTO: 10.8 FL (ref 8.6–11.7)
POTASSIUM SERPL-SCNC: 3.9 MMOL/L (ref 3.5–5.5)
RBC # BLD AUTO: 3.07 MILLION/UL (ref 4.3–5.9)
RBC MORPH BLD: NORMAL
SODIUM SERPL-SCNC: 134 MMOL/L (ref 134–143)
WBC # BLD AUTO: 7.3 THOUSAND/UL (ref 4.8–10.8)

## 2019-05-01 PROCEDURE — 80048 BASIC METABOLIC PNL TOTAL CA: CPT | Performed by: NURSE PRACTITIONER

## 2019-05-01 PROCEDURE — 97535 SELF CARE MNGMENT TRAINING: CPT

## 2019-05-01 PROCEDURE — 82948 REAGENT STRIP/BLOOD GLUCOSE: CPT

## 2019-05-01 PROCEDURE — 97530 THERAPEUTIC ACTIVITIES: CPT

## 2019-05-01 PROCEDURE — 99231 SBSQ HOSP IP/OBS SF/LOW 25: CPT | Performed by: INTERNAL MEDICINE

## 2019-05-01 PROCEDURE — 85025 COMPLETE CBC W/AUTO DIFF WBC: CPT | Performed by: NURSE PRACTITIONER

## 2019-05-01 PROCEDURE — 99232 SBSQ HOSP IP/OBS MODERATE 35: CPT | Performed by: NURSE PRACTITIONER

## 2019-05-01 RX ORDER — SODIUM CHLORIDE 9 MG/ML
75 INJECTION, SOLUTION INTRAVENOUS CONTINUOUS
Status: DISCONTINUED | OUTPATIENT
Start: 2019-05-01 | End: 2019-05-01

## 2019-05-01 RX ORDER — ONDANSETRON 2 MG/ML
4 INJECTION INTRAMUSCULAR; INTRAVENOUS EVERY 4 HOURS PRN
Status: DISCONTINUED | OUTPATIENT
Start: 2019-05-01 | End: 2019-05-05 | Stop reason: HOSPADM

## 2019-05-01 RX ORDER — AMLODIPINE BESYLATE 5 MG/1
5 TABLET ORAL DAILY
Status: DISCONTINUED | OUTPATIENT
Start: 2019-05-01 | End: 2019-05-02

## 2019-05-01 RX ADMIN — TAMSULOSIN HYDROCHLORIDE 0.4 MG: 0.4 CAPSULE ORAL at 18:22

## 2019-05-01 RX ADMIN — HEPARIN SODIUM 5000 UNITS: 5000 INJECTION INTRAVENOUS; SUBCUTANEOUS at 05:22

## 2019-05-01 RX ADMIN — SODIUM CHLORIDE 75 ML/HR: 9 INJECTION, SOLUTION INTRAVENOUS at 10:35

## 2019-05-01 RX ADMIN — HEPARIN SODIUM 5000 UNITS: 5000 INJECTION INTRAVENOUS; SUBCUTANEOUS at 18:22

## 2019-05-01 RX ADMIN — AMLODIPINE BESYLATE 5 MG: 5 TABLET ORAL at 10:35

## 2019-05-01 RX ADMIN — INSULIN DETEMIR 10 UNITS: 100 INJECTION, SOLUTION SUBCUTANEOUS at 22:22

## 2019-05-01 RX ADMIN — PANTOPRAZOLE SODIUM 40 MG: 40 TABLET, DELAYED RELEASE ORAL at 05:22

## 2019-05-01 RX ADMIN — INSULIN LISPRO 1 UNITS: 100 INJECTION, SOLUTION INTRAVENOUS; SUBCUTANEOUS at 12:14

## 2019-05-01 RX ADMIN — ERTAPENEM SODIUM 500 MG: 1 INJECTION, POWDER, LYOPHILIZED, FOR SOLUTION INTRAMUSCULAR; INTRAVENOUS at 13:42

## 2019-05-01 RX ADMIN — ASPIRIN 81 MG 81 MG: 81 TABLET ORAL at 08:42

## 2019-05-01 RX ADMIN — SODIUM CHLORIDE 125 ML/HR: 9 INJECTION, SOLUTION INTRAVENOUS at 02:39

## 2019-05-01 RX ADMIN — HYDROCODONE BITARTRATE AND ACETAMINOPHEN 1 TABLET: 5; 325 TABLET ORAL at 01:52

## 2019-05-02 ENCOUNTER — APPOINTMENT (INPATIENT)
Dept: INTERVENTIONAL RADIOLOGY/VASCULAR | Facility: HOSPITAL | Age: 79
DRG: 872 | End: 2019-05-02
Payer: COMMERCIAL

## 2019-05-02 LAB
ANION GAP SERPL CALCULATED.3IONS-SCNC: 7 MMOL/L (ref 4–13)
BACTERIA BLD CULT: ABNORMAL
BACTERIA BLD CULT: ABNORMAL
BACTERIA UR CULT: ABNORMAL
BACTERIA UR CULT: ABNORMAL
BUN SERPL-MCNC: 33 MG/DL (ref 7–25)
CALCIUM SERPL-MCNC: 8.2 MG/DL (ref 8.6–10.5)
CHLORIDE SERPL-SCNC: 107 MMOL/L (ref 98–107)
CO2 SERPL-SCNC: 21 MMOL/L (ref 21–31)
CREAT SERPL-MCNC: 2 MG/DL (ref 0.7–1.3)
ERYTHROCYTE [DISTWIDTH] IN BLOOD BY AUTOMATED COUNT: 14.7 % (ref 11.5–14.5)
GFR SERPL CREATININE-BSD FRML MDRD: 31 ML/MIN/1.73SQ M
GLUCOSE SERPL-MCNC: 218 MG/DL (ref 65–140)
GLUCOSE SERPL-MCNC: 239 MG/DL (ref 65–140)
GLUCOSE SERPL-MCNC: 254 MG/DL (ref 65–140)
GLUCOSE SERPL-MCNC: 69 MG/DL (ref 65–140)
GLUCOSE SERPL-MCNC: 83 MG/DL (ref 65–99)
GRAM STN SPEC: ABNORMAL
GRAM STN SPEC: ABNORMAL
HCT VFR BLD AUTO: 27.7 % (ref 42–47)
HGB BLD-MCNC: 9.4 G/DL (ref 14–18)
MCH RBC QN AUTO: 29.7 PG (ref 26–34)
MCHC RBC AUTO-ENTMCNC: 33.9 G/DL (ref 31–37)
MCV RBC AUTO: 88 FL (ref 81–99)
PLATELET # BLD AUTO: 103 THOUSANDS/UL (ref 149–390)
PMV BLD AUTO: 10.1 FL (ref 8.6–11.7)
POTASSIUM SERPL-SCNC: 3.7 MMOL/L (ref 3.5–5.5)
RBC # BLD AUTO: 3.17 MILLION/UL (ref 4.3–5.9)
SODIUM SERPL-SCNC: 135 MMOL/L (ref 134–143)
WBC # BLD AUTO: 6.1 THOUSAND/UL (ref 4.8–10.8)

## 2019-05-02 PROCEDURE — 76937 US GUIDE VASCULAR ACCESS: CPT

## 2019-05-02 PROCEDURE — 87040 BLOOD CULTURE FOR BACTERIA: CPT | Performed by: NURSE PRACTITIONER

## 2019-05-02 PROCEDURE — 36410 VNPNXR 3YR/> PHY/QHP DX/THER: CPT | Performed by: RADIOLOGY

## 2019-05-02 PROCEDURE — 99232 SBSQ HOSP IP/OBS MODERATE 35: CPT | Performed by: NURSE PRACTITIONER

## 2019-05-02 PROCEDURE — NC001 PR NO CHARGE: Performed by: RADIOLOGY

## 2019-05-02 PROCEDURE — 97110 THERAPEUTIC EXERCISES: CPT

## 2019-05-02 PROCEDURE — 85027 COMPLETE CBC AUTOMATED: CPT | Performed by: NURSE PRACTITIONER

## 2019-05-02 PROCEDURE — 82948 REAGENT STRIP/BLOOD GLUCOSE: CPT

## 2019-05-02 PROCEDURE — 76937 US GUIDE VASCULAR ACCESS: CPT | Performed by: RADIOLOGY

## 2019-05-02 PROCEDURE — 05HB33Z INSERTION OF INFUSION DEVICE INTO RIGHT BASILIC VEIN, PERCUTANEOUS APPROACH: ICD-10-PCS | Performed by: RADIOLOGY

## 2019-05-02 PROCEDURE — 80048 BASIC METABOLIC PNL TOTAL CA: CPT | Performed by: NURSE PRACTITIONER

## 2019-05-02 PROCEDURE — C1751 CATH, INF, PER/CENT/MIDLINE: HCPCS

## 2019-05-02 RX ORDER — AMLODIPINE BESYLATE 2.5 MG/1
2.5 TABLET ORAL ONCE
Status: COMPLETED | OUTPATIENT
Start: 2019-05-02 | End: 2019-05-02

## 2019-05-02 RX ORDER — LIDOCAINE HYDROCHLORIDE 10 MG/ML
INJECTION, SOLUTION INFILTRATION; PERINEURAL CODE/TRAUMA/SEDATION MEDICATION
Status: COMPLETED | OUTPATIENT
Start: 2019-05-02 | End: 2019-05-02

## 2019-05-02 RX ORDER — CLONIDINE HYDROCHLORIDE 0.1 MG/1
0.1 TABLET ORAL
Status: DISCONTINUED | OUTPATIENT
Start: 2019-05-02 | End: 2019-05-05 | Stop reason: HOSPADM

## 2019-05-02 RX ADMIN — HEPARIN SODIUM 5000 UNITS: 5000 INJECTION INTRAVENOUS; SUBCUTANEOUS at 22:10

## 2019-05-02 RX ADMIN — HEPARIN SODIUM 5000 UNITS: 5000 INJECTION INTRAVENOUS; SUBCUTANEOUS at 13:59

## 2019-05-02 RX ADMIN — PANTOPRAZOLE SODIUM 40 MG: 40 TABLET, DELAYED RELEASE ORAL at 06:47

## 2019-05-02 RX ADMIN — ASPIRIN 81 MG 81 MG: 81 TABLET ORAL at 09:02

## 2019-05-02 RX ADMIN — LIDOCAINE HYDROCHLORIDE 10 ML: 10 INJECTION, SOLUTION INFILTRATION; PERINEURAL at 13:32

## 2019-05-02 RX ADMIN — CLONIDINE HYDROCHLORIDE 0.1 MG: 0.1 TABLET ORAL at 01:15

## 2019-05-02 RX ADMIN — AMLODIPINE BESYLATE 5 MG: 5 TABLET ORAL at 09:02

## 2019-05-02 RX ADMIN — INSULIN DETEMIR 10 UNITS: 100 INJECTION, SOLUTION SUBCUTANEOUS at 22:11

## 2019-05-02 RX ADMIN — INSULIN LISPRO 3 UNITS: 100 INJECTION, SOLUTION INTRAVENOUS; SUBCUTANEOUS at 17:22

## 2019-05-02 RX ADMIN — INSULIN LISPRO 3 UNITS: 100 INJECTION, SOLUTION INTRAVENOUS; SUBCUTANEOUS at 11:43

## 2019-05-02 RX ADMIN — ERTAPENEM SODIUM 500 MG: 1 INJECTION, POWDER, LYOPHILIZED, FOR SOLUTION INTRAMUSCULAR; INTRAVENOUS at 13:58

## 2019-05-02 RX ADMIN — HEPARIN SODIUM 5000 UNITS: 5000 INJECTION INTRAVENOUS; SUBCUTANEOUS at 06:47

## 2019-05-02 RX ADMIN — INSULIN LISPRO 2 UNITS: 100 INJECTION, SOLUTION INTRAVENOUS; SUBCUTANEOUS at 22:10

## 2019-05-02 RX ADMIN — TAMSULOSIN HYDROCHLORIDE 0.4 MG: 0.4 CAPSULE ORAL at 17:22

## 2019-05-02 RX ADMIN — AMLODIPINE BESYLATE 2.5 MG: 2.5 TABLET ORAL at 11:10

## 2019-05-03 ENCOUNTER — APPOINTMENT (INPATIENT)
Dept: RADIOLOGY | Facility: HOSPITAL | Age: 79
DRG: 872 | End: 2019-05-03
Payer: COMMERCIAL

## 2019-05-03 PROBLEM — M25.552 LEFT HIP PAIN: Status: ACTIVE | Noted: 2019-05-03

## 2019-05-03 LAB
ANION GAP SERPL CALCULATED.3IONS-SCNC: 8 MMOL/L (ref 4–13)
BACTERIA BLD CULT: ABNORMAL
BACTERIA BLD CULT: ABNORMAL
BUN SERPL-MCNC: 31 MG/DL (ref 7–25)
CALCIUM SERPL-MCNC: 8.5 MG/DL (ref 8.6–10.5)
CHLORIDE SERPL-SCNC: 104 MMOL/L (ref 98–107)
CO2 SERPL-SCNC: 22 MMOL/L (ref 21–31)
CREAT SERPL-MCNC: 1.8 MG/DL (ref 0.7–1.3)
ERYTHROCYTE [DISTWIDTH] IN BLOOD BY AUTOMATED COUNT: 14.6 % (ref 11.5–14.5)
GFR SERPL CREATININE-BSD FRML MDRD: 35 ML/MIN/1.73SQ M
GLUCOSE SERPL-MCNC: 138 MG/DL (ref 65–99)
GLUCOSE SERPL-MCNC: 154 MG/DL (ref 65–140)
GLUCOSE SERPL-MCNC: 207 MG/DL (ref 65–140)
GLUCOSE SERPL-MCNC: 221 MG/DL (ref 65–140)
GLUCOSE SERPL-MCNC: 255 MG/DL (ref 65–140)
GRAM STN SPEC: ABNORMAL
GRAM STN SPEC: ABNORMAL
HCT VFR BLD AUTO: 28.5 % (ref 42–47)
HGB BLD-MCNC: 9.7 G/DL (ref 14–18)
MCH RBC QN AUTO: 29.6 PG (ref 26–34)
MCHC RBC AUTO-ENTMCNC: 34 G/DL (ref 31–37)
MCV RBC AUTO: 87 FL (ref 81–99)
PLATELET # BLD AUTO: 118 THOUSANDS/UL (ref 149–390)
PMV BLD AUTO: 9 FL (ref 8.6–11.7)
POTASSIUM SERPL-SCNC: 3.8 MMOL/L (ref 3.5–5.5)
RBC # BLD AUTO: 3.28 MILLION/UL (ref 4.3–5.9)
SODIUM SERPL-SCNC: 134 MMOL/L (ref 134–143)
WBC # BLD AUTO: 7.3 THOUSAND/UL (ref 4.8–10.8)

## 2019-05-03 PROCEDURE — 85027 COMPLETE CBC AUTOMATED: CPT | Performed by: NURSE PRACTITIONER

## 2019-05-03 PROCEDURE — 82948 REAGENT STRIP/BLOOD GLUCOSE: CPT

## 2019-05-03 PROCEDURE — 97116 GAIT TRAINING THERAPY: CPT

## 2019-05-03 PROCEDURE — 99232 SBSQ HOSP IP/OBS MODERATE 35: CPT | Performed by: NURSE PRACTITIONER

## 2019-05-03 PROCEDURE — 73502 X-RAY EXAM HIP UNI 2-3 VIEWS: CPT

## 2019-05-03 PROCEDURE — 80048 BASIC METABOLIC PNL TOTAL CA: CPT | Performed by: NURSE PRACTITIONER

## 2019-05-03 RX ADMIN — HEPARIN SODIUM 5000 UNITS: 5000 INJECTION INTRAVENOUS; SUBCUTANEOUS at 05:25

## 2019-05-03 RX ADMIN — ASPIRIN 81 MG 81 MG: 81 TABLET ORAL at 10:18

## 2019-05-03 RX ADMIN — HYDROCODONE BITARTRATE AND ACETAMINOPHEN 1 TABLET: 5; 325 TABLET ORAL at 10:18

## 2019-05-03 RX ADMIN — HEPARIN SODIUM 5000 UNITS: 5000 INJECTION INTRAVENOUS; SUBCUTANEOUS at 21:47

## 2019-05-03 RX ADMIN — AMLODIPINE BESYLATE 7.5 MG: 2.5 TABLET ORAL at 10:18

## 2019-05-03 RX ADMIN — TAMSULOSIN HYDROCHLORIDE 0.4 MG: 0.4 CAPSULE ORAL at 16:16

## 2019-05-03 RX ADMIN — INSULIN DETEMIR 10 UNITS: 100 INJECTION, SOLUTION SUBCUTANEOUS at 21:46

## 2019-05-03 RX ADMIN — PANTOPRAZOLE SODIUM 40 MG: 40 TABLET, DELAYED RELEASE ORAL at 05:26

## 2019-05-03 RX ADMIN — INSULIN LISPRO 2 UNITS: 100 INJECTION, SOLUTION INTRAVENOUS; SUBCUTANEOUS at 16:16

## 2019-05-03 RX ADMIN — INSULIN LISPRO 1 UNITS: 100 INJECTION, SOLUTION INTRAVENOUS; SUBCUTANEOUS at 21:46

## 2019-05-03 RX ADMIN — HEPARIN SODIUM 5000 UNITS: 5000 INJECTION INTRAVENOUS; SUBCUTANEOUS at 16:16

## 2019-05-03 RX ADMIN — ERTAPENEM SODIUM 1000 MG: 1 INJECTION, POWDER, LYOPHILIZED, FOR SOLUTION INTRAMUSCULAR; INTRAVENOUS at 12:39

## 2019-05-03 RX ADMIN — INSULIN LISPRO 3 UNITS: 100 INJECTION, SOLUTION INTRAVENOUS; SUBCUTANEOUS at 11:51

## 2019-05-04 VITALS
RESPIRATION RATE: 18 BRPM | HEIGHT: 68 IN | WEIGHT: 164.31 LBS | DIASTOLIC BLOOD PRESSURE: 72 MMHG | SYSTOLIC BLOOD PRESSURE: 142 MMHG | TEMPERATURE: 98.1 F | BODY MASS INDEX: 24.9 KG/M2 | HEART RATE: 71 BPM | OXYGEN SATURATION: 94 %

## 2019-05-04 LAB
ANION GAP SERPL CALCULATED.3IONS-SCNC: 10 MMOL/L (ref 4–13)
BUN SERPL-MCNC: 32 MG/DL (ref 7–25)
CALCIUM SERPL-MCNC: 8.7 MG/DL (ref 8.6–10.5)
CHLORIDE SERPL-SCNC: 105 MMOL/L (ref 98–107)
CO2 SERPL-SCNC: 24 MMOL/L (ref 21–31)
CREAT SERPL-MCNC: 1.93 MG/DL (ref 0.7–1.3)
EOSINOPHIL # BLD AUTO: 0.15 THOUSAND/UL (ref 0–0.61)
EOSINOPHIL NFR BLD MANUAL: 2 % (ref 0–6)
ERYTHROCYTE [DISTWIDTH] IN BLOOD BY AUTOMATED COUNT: 14.9 % (ref 11.5–14.5)
GFR SERPL CREATININE-BSD FRML MDRD: 32 ML/MIN/1.73SQ M
GLUCOSE SERPL-MCNC: 177 MG/DL (ref 65–140)
GLUCOSE SERPL-MCNC: 183 MG/DL (ref 65–99)
GLUCOSE SERPL-MCNC: 227 MG/DL (ref 65–140)
GLUCOSE SERPL-MCNC: 239 MG/DL (ref 65–140)
GLUCOSE SERPL-MCNC: 248 MG/DL (ref 65–140)
HCT VFR BLD AUTO: 30.4 % (ref 42–47)
HGB BLD-MCNC: 10.3 G/DL (ref 14–18)
LYMPHOCYTES # BLD AUTO: 1.24 THOUSAND/UL (ref 0.6–4.47)
LYMPHOCYTES # BLD AUTO: 17 % (ref 20–51)
MCH RBC QN AUTO: 30 PG (ref 26–34)
MCHC RBC AUTO-ENTMCNC: 33.9 G/DL (ref 31–37)
MCV RBC AUTO: 88 FL (ref 81–99)
MONOCYTES # BLD AUTO: 1.53 THOUSAND/UL (ref 0–1.22)
MONOCYTES NFR BLD AUTO: 21 % (ref 4–12)
NEUTS BAND NFR BLD MANUAL: 5 % (ref 0–8)
NEUTS SEG # BLD: 4.38 THOUSAND/UL (ref 1.81–6.82)
NEUTS SEG NFR BLD AUTO: 55 % (ref 42–75)
PLATELET # BLD AUTO: 135 THOUSANDS/UL (ref 149–390)
PLATELET BLD QL SMEAR: ABNORMAL
PMV BLD AUTO: 9.2 FL (ref 8.6–11.7)
POTASSIUM SERPL-SCNC: 4.3 MMOL/L (ref 3.5–5.5)
RBC # BLD AUTO: 3.44 MILLION/UL (ref 4.3–5.9)
RBC MORPH BLD: NORMAL
SODIUM SERPL-SCNC: 139 MMOL/L (ref 134–143)
TOTAL CELLS COUNTED SPEC: 100
WBC # BLD AUTO: 7.3 THOUSAND/UL (ref 4.8–10.8)

## 2019-05-04 PROCEDURE — 80048 BASIC METABOLIC PNL TOTAL CA: CPT | Performed by: NURSE PRACTITIONER

## 2019-05-04 PROCEDURE — 85007 BL SMEAR W/DIFF WBC COUNT: CPT | Performed by: NURSE PRACTITIONER

## 2019-05-04 PROCEDURE — 97116 GAIT TRAINING THERAPY: CPT

## 2019-05-04 PROCEDURE — 82948 REAGENT STRIP/BLOOD GLUCOSE: CPT

## 2019-05-04 PROCEDURE — 99232 SBSQ HOSP IP/OBS MODERATE 35: CPT | Performed by: NURSE PRACTITIONER

## 2019-05-04 PROCEDURE — 97530 THERAPEUTIC ACTIVITIES: CPT

## 2019-05-04 PROCEDURE — 85027 COMPLETE CBC AUTOMATED: CPT | Performed by: NURSE PRACTITIONER

## 2019-05-04 RX ADMIN — ASPIRIN 81 MG 81 MG: 81 TABLET ORAL at 09:48

## 2019-05-04 RX ADMIN — INSULIN LISPRO 3 UNITS: 100 INJECTION, SOLUTION INTRAVENOUS; SUBCUTANEOUS at 16:25

## 2019-05-04 RX ADMIN — INSULIN LISPRO 2 UNITS: 100 INJECTION, SOLUTION INTRAVENOUS; SUBCUTANEOUS at 21:41

## 2019-05-04 RX ADMIN — PANTOPRAZOLE SODIUM 40 MG: 40 TABLET, DELAYED RELEASE ORAL at 05:16

## 2019-05-04 RX ADMIN — INSULIN LISPRO 1 UNITS: 100 INJECTION, SOLUTION INTRAVENOUS; SUBCUTANEOUS at 09:48

## 2019-05-04 RX ADMIN — HEPARIN SODIUM 5000 UNITS: 5000 INJECTION INTRAVENOUS; SUBCUTANEOUS at 21:41

## 2019-05-04 RX ADMIN — AMLODIPINE BESYLATE 7.5 MG: 2.5 TABLET ORAL at 09:48

## 2019-05-04 RX ADMIN — ERTAPENEM SODIUM 1000 MG: 1 INJECTION, POWDER, LYOPHILIZED, FOR SOLUTION INTRAMUSCULAR; INTRAVENOUS at 12:17

## 2019-05-04 RX ADMIN — HEPARIN SODIUM 5000 UNITS: 5000 INJECTION INTRAVENOUS; SUBCUTANEOUS at 16:24

## 2019-05-04 RX ADMIN — HEPARIN SODIUM 5000 UNITS: 5000 INJECTION INTRAVENOUS; SUBCUTANEOUS at 05:16

## 2019-05-04 RX ADMIN — INSULIN DETEMIR 10 UNITS: 100 INJECTION, SOLUTION SUBCUTANEOUS at 21:41

## 2019-05-04 RX ADMIN — INSULIN LISPRO 2 UNITS: 100 INJECTION, SOLUTION INTRAVENOUS; SUBCUTANEOUS at 12:14

## 2019-05-04 RX ADMIN — TAMSULOSIN HYDROCHLORIDE 0.4 MG: 0.4 CAPSULE ORAL at 16:25

## 2019-05-05 LAB
ANION GAP SERPL CALCULATED.3IONS-SCNC: 7 MMOL/L (ref 4–13)
BUN SERPL-MCNC: 30 MG/DL (ref 7–25)
CALCIUM SERPL-MCNC: 8.5 MG/DL (ref 8.6–10.5)
CHLORIDE SERPL-SCNC: 102 MMOL/L (ref 98–107)
CO2 SERPL-SCNC: 26 MMOL/L (ref 21–31)
CREAT SERPL-MCNC: 1.72 MG/DL (ref 0.7–1.3)
ERYTHROCYTE [DISTWIDTH] IN BLOOD BY AUTOMATED COUNT: 14.7 % (ref 11.5–14.5)
GFR SERPL CREATININE-BSD FRML MDRD: 37 ML/MIN/1.73SQ M
GLUCOSE SERPL-MCNC: 132 MG/DL (ref 65–140)
GLUCOSE SERPL-MCNC: 136 MG/DL (ref 65–99)
HCT VFR BLD AUTO: 29.7 % (ref 42–47)
HGB BLD-MCNC: 10.2 G/DL (ref 14–18)
MCH RBC QN AUTO: 29.3 PG (ref 26–34)
MCHC RBC AUTO-ENTMCNC: 34.2 G/DL (ref 31–37)
MCV RBC AUTO: 86 FL (ref 81–99)
PLATELET # BLD AUTO: 163 THOUSANDS/UL (ref 149–390)
PMV BLD AUTO: 8.8 FL (ref 8.6–11.7)
POTASSIUM SERPL-SCNC: 4.3 MMOL/L (ref 3.5–5.5)
RBC # BLD AUTO: 3.47 MILLION/UL (ref 4.3–5.9)
SODIUM SERPL-SCNC: 135 MMOL/L (ref 134–143)
WBC # BLD AUTO: 8.1 THOUSAND/UL (ref 4.8–10.8)

## 2019-05-05 PROCEDURE — 82948 REAGENT STRIP/BLOOD GLUCOSE: CPT

## 2019-05-05 PROCEDURE — 80048 BASIC METABOLIC PNL TOTAL CA: CPT | Performed by: NURSE PRACTITIONER

## 2019-05-05 PROCEDURE — 85027 COMPLETE CBC AUTOMATED: CPT | Performed by: NURSE PRACTITIONER

## 2019-05-05 PROCEDURE — 99239 HOSP IP/OBS DSCHRG MGMT >30: CPT | Performed by: NURSE PRACTITIONER

## 2019-05-05 RX ORDER — AMLODIPINE BESYLATE 2.5 MG/1
7.5 TABLET ORAL DAILY
Qty: 90 TABLET | Refills: 0 | Status: ON HOLD | OUTPATIENT
Start: 2019-05-05 | End: 2019-06-07

## 2019-05-05 RX ADMIN — PANTOPRAZOLE SODIUM 40 MG: 40 TABLET, DELAYED RELEASE ORAL at 05:06

## 2019-05-05 RX ADMIN — HEPARIN SODIUM 5000 UNITS: 5000 INJECTION INTRAVENOUS; SUBCUTANEOUS at 05:06

## 2019-05-06 DIAGNOSIS — R78.81 GRAM-NEGATIVE BACTEREMIA: Primary | ICD-10-CM

## 2019-05-07 LAB
BACTERIA BLD CULT: NORMAL
BACTERIA BLD CULT: NORMAL

## 2019-05-11 RX ORDER — LANREOTIDE ACETATE 120 MG/.5ML
120 INJECTION SUBCUTANEOUS ONCE
Status: COMPLETED | OUTPATIENT
Start: 2019-05-13 | End: 2019-05-13

## 2019-05-13 ENCOUNTER — HOSPITAL ENCOUNTER (OUTPATIENT)
Dept: INFUSION CENTER | Facility: HOSPITAL | Age: 79
Discharge: HOME | End: 2019-05-13
Payer: COMMERCIAL

## 2019-05-13 VITALS
TEMPERATURE: 96.3 F | RESPIRATION RATE: 18 BRPM | HEART RATE: 81 BPM | DIASTOLIC BLOOD PRESSURE: 70 MMHG | SYSTOLIC BLOOD PRESSURE: 162 MMHG

## 2019-05-13 PROCEDURE — 96372 THER/PROPH/DIAG INJ SC/IM: CPT

## 2019-05-13 RX ORDER — ACETAMINOPHEN 325 MG/1
650 TABLET ORAL EVERY 6 HOURS PRN
COMMUNITY
End: 2019-07-07 | Stop reason: HOSPADM

## 2019-05-13 RX ADMIN — LANREOTIDE ACETATE 120 MG: 120 INJECTION SUBCUTANEOUS at 13:06

## 2019-05-28 ENCOUNTER — OFFICE VISIT (OUTPATIENT)
Dept: HEMATOLOGY ONCOLOGY | Facility: CLINIC | Age: 79
End: 2019-05-28
Payer: COMMERCIAL

## 2019-05-28 VITALS
WEIGHT: 153.9 LBS | OXYGEN SATURATION: 98 % | BODY MASS INDEX: 23.33 KG/M2 | RESPIRATION RATE: 16 BRPM | HEIGHT: 68 IN | DIASTOLIC BLOOD PRESSURE: 70 MMHG | HEART RATE: 76 BPM | TEMPERATURE: 97 F | SYSTOLIC BLOOD PRESSURE: 138 MMHG

## 2019-05-28 DIAGNOSIS — D64.9 ANEMIA, UNSPECIFIED TYPE: ICD-10-CM

## 2019-05-28 DIAGNOSIS — C7B.8 NEUROENDOCRINE CARCINOMA METASTATIC TO LIVER (HCC): Primary | ICD-10-CM

## 2019-05-28 DIAGNOSIS — C7A.8 NEUROENDOCRINE CARCINOMA METASTATIC TO LIVER (HCC): Primary | ICD-10-CM

## 2019-05-28 PROCEDURE — 99214 OFFICE O/P EST MOD 30 MIN: CPT | Performed by: INTERNAL MEDICINE

## 2019-06-03 ENCOUNTER — HOSPITAL ENCOUNTER (INPATIENT)
Facility: HOSPITAL | Age: 79
LOS: 4 days | Discharge: NURSING, REHAB OR LONG-TERM CARE | DRG: 872 | End: 2019-06-07
Attending: EMERGENCY MEDICINE | Admitting: INTERNAL MEDICINE
Payer: COMMERCIAL

## 2019-06-03 ENCOUNTER — APPOINTMENT (EMERGENCY)
Dept: RADIOLOGY | Facility: HOSPITAL | Age: 79
DRG: 872 | End: 2019-06-03
Payer: COMMERCIAL

## 2019-06-03 DIAGNOSIS — A41.9 SEPSIS, DUE TO UNSPECIFIED ORGANISM: Primary | ICD-10-CM

## 2019-06-03 DIAGNOSIS — R73.9 HYPERGLYCEMIA: ICD-10-CM

## 2019-06-03 DIAGNOSIS — I10 ESSENTIAL HYPERTENSION: ICD-10-CM

## 2019-06-03 DIAGNOSIS — Z97.8 CHRONIC INDWELLING FOLEY CATHETER: Chronic | ICD-10-CM

## 2019-06-03 DIAGNOSIS — A41.51 SEPSIS DUE TO ESCHERICHIA COLI (HCC): Chronic | ICD-10-CM

## 2019-06-03 DIAGNOSIS — N17.9 AKI (ACUTE KIDNEY INJURY) (HCC): ICD-10-CM

## 2019-06-03 DIAGNOSIS — R55 SYNCOPE: ICD-10-CM

## 2019-06-03 DIAGNOSIS — N39.0 UTI (URINARY TRACT INFECTION): ICD-10-CM

## 2019-06-03 DIAGNOSIS — R55 SYNCOPE AND COLLAPSE: Chronic | ICD-10-CM

## 2019-06-03 PROBLEM — E11.8 TYPE 2 DIABETES MELLITUS WITH COMPLICATION, WITH LONG-TERM CURRENT USE OF INSULIN (HCC): Chronic | Status: ACTIVE | Noted: 2019-01-23

## 2019-06-03 PROBLEM — N30.01 ACUTE CYSTITIS WITH HEMATURIA: Chronic | Status: ACTIVE | Noted: 2019-06-03

## 2019-06-03 PROBLEM — R19.7 DIARRHEA: Chronic | Status: RESOLVED | Noted: 2019-01-23 | Resolved: 2019-06-03

## 2019-06-03 PROBLEM — D3A.8 NEUROENDOCRINE TUMOR: Chronic | Status: ACTIVE | Noted: 2019-02-03

## 2019-06-03 PROBLEM — N18.9 ACUTE KIDNEY INJURY SUPERIMPOSED ON CHRONIC KIDNEY DISEASE (HCC): Chronic | Status: ACTIVE | Noted: 2019-04-01

## 2019-06-03 PROBLEM — E78.2 MIXED HYPERLIPIDEMIA: Chronic | Status: ACTIVE | Noted: 2019-01-23

## 2019-06-03 PROBLEM — Z79.4 TYPE 2 DIABETES MELLITUS WITH COMPLICATION, WITH LONG-TERM CURRENT USE OF INSULIN (HCC): Chronic | Status: ACTIVE | Noted: 2019-01-23

## 2019-06-03 LAB
ALBUMIN SERPL BCP-MCNC: 3.3 G/DL (ref 3.5–5.7)
ALP SERPL-CCNC: 101 U/L (ref 55–165)
ALT SERPL W P-5'-P-CCNC: 7 U/L (ref 7–52)
ANION GAP SERPL CALCULATED.3IONS-SCNC: 13 MMOL/L (ref 4–13)
APTT PPP: 30 SECONDS (ref 26–38)
AST SERPL W P-5'-P-CCNC: 8 U/L (ref 13–39)
ATRIAL RATE: 97 BPM
BACTERIA UR QL AUTO: ABNORMAL /HPF
BILIRUB SERPL-MCNC: 1.3 MG/DL (ref 0.2–1)
BILIRUB UR QL STRIP: NEGATIVE
BNP SERPL-MCNC: 116 PG/ML (ref 1–100)
BUN SERPL-MCNC: 36 MG/DL (ref 7–25)
CALCIUM SERPL-MCNC: 9.4 MG/DL (ref 8.6–10.5)
CHLORIDE SERPL-SCNC: 97 MMOL/L (ref 98–107)
CK SERPL-CCNC: 15 U/L (ref 30–308)
CLARITY UR: ABNORMAL
CO2 SERPL-SCNC: 22 MMOL/L (ref 21–31)
COARSE GRAN CASTS URNS QL MICRO: ABNORMAL /LPF
COLOR UR: YELLOW
CREAT SERPL-MCNC: 2.33 MG/DL (ref 0.7–1.3)
ERYTHROCYTE [DISTWIDTH] IN BLOOD BY AUTOMATED COUNT: 14.8 % (ref 11.5–14.5)
GFR SERPL CREATININE-BSD FRML MDRD: 25 ML/MIN/1.73SQ M
GLUCOSE SERPL-MCNC: 263 MG/DL (ref 65–140)
GLUCOSE SERPL-MCNC: 292 MG/DL (ref 65–140)
GLUCOSE SERPL-MCNC: 371 MG/DL (ref 65–99)
GLUCOSE UR STRIP-MCNC: ABNORMAL MG/DL
HCT VFR BLD AUTO: 33.2 % (ref 42–47)
HGB BLD-MCNC: 10.8 G/DL (ref 14–18)
HGB UR QL STRIP.AUTO: ABNORMAL
INR PPP: 1.63 (ref 0.9–1.5)
KETONES UR STRIP-MCNC: NEGATIVE MG/DL
LACTATE SERPL-SCNC: 0.9 MMOL/L (ref 0.5–2)
LACTATE SERPL-SCNC: 1 MMOL/L (ref 0.5–2)
LACTATE SERPL-SCNC: 2.9 MMOL/L (ref 0.5–2)
LEUKOCYTE ESTERASE UR QL STRIP: ABNORMAL
LYMPHOCYTES # BLD AUTO: 0.63 THOUSAND/UL (ref 0.6–4.47)
LYMPHOCYTES # BLD AUTO: 3 % (ref 20–51)
MCH RBC QN AUTO: 27.8 PG (ref 26–34)
MCHC RBC AUTO-ENTMCNC: 32.4 G/DL (ref 31–37)
MCV RBC AUTO: 86 FL (ref 81–99)
MONOCYTES # BLD AUTO: 0.63 THOUSAND/UL (ref 0–1.22)
MONOCYTES NFR BLD AUTO: 3 % (ref 4–12)
MYELOCYTES NFR BLD MANUAL: 1 % (ref 0–1)
NEUTS SEG # BLD: 19.62 THOUSAND/UL (ref 1.81–6.82)
NEUTS SEG NFR BLD AUTO: 93 % (ref 42–75)
NITRITE UR QL STRIP: NEGATIVE
NON-SQ EPI CELLS URNS QL MICRO: ABNORMAL /HPF
P AXIS: 78 DEGREES
PH UR STRIP.AUTO: 5.5 [PH]
PLATELET # BLD AUTO: 291 THOUSANDS/UL (ref 149–390)
PMV BLD AUTO: 8.3 FL (ref 8.6–11.7)
POTASSIUM SERPL-SCNC: 4.9 MMOL/L (ref 3.5–5.5)
PR INTERVAL: 150 MS
PROCALCITONIN SERPL-MCNC: 0.62 NG/ML
PROCALCITONIN SERPL-MCNC: 1.56 NG/ML
PROT SERPL-MCNC: 7.3 G/DL (ref 6.4–8.9)
PROT UR STRIP-MCNC: ABNORMAL MG/DL
PROTHROMBIN TIME: 19 SECONDS (ref 10.2–13)
QRS AXIS: -65 DEGREES
QRSD INTERVAL: 96 MS
QT INTERVAL: 342 MS
QTC INTERVAL: 434 MS
RBC # BLD AUTO: 3.88 MILLION/UL (ref 4.3–5.9)
RBC #/AREA URNS AUTO: ABNORMAL /HPF
SODIUM SERPL-SCNC: 132 MMOL/L (ref 134–143)
SP GR UR STRIP.AUTO: 1.02 (ref 1–1.03)
T WAVE AXIS: 84 DEGREES
TOTAL CELLS COUNTED SPEC: 100
TROPONIN I SERPL-MCNC: 0.03 NG/ML
TROPONIN I SERPL-MCNC: <0.03 NG/ML
TROPONIN I SERPL-MCNC: <0.03 NG/ML
UROBILINOGEN UR QL STRIP.AUTO: 0.2 E.U./DL
VENTRICULAR RATE: 97 BPM
WBC # BLD AUTO: 21.1 THOUSAND/UL (ref 4.8–10.8)
WBC #/AREA URNS AUTO: ABNORMAL /HPF

## 2019-06-03 PROCEDURE — 81001 URINALYSIS AUTO W/SCOPE: CPT | Performed by: EMERGENCY MEDICINE

## 2019-06-03 PROCEDURE — 87077 CULTURE AEROBIC IDENTIFY: CPT | Performed by: NURSE PRACTITIONER

## 2019-06-03 PROCEDURE — 84145 PROCALCITONIN (PCT): CPT | Performed by: NURSE PRACTITIONER

## 2019-06-03 PROCEDURE — 99223 1ST HOSP IP/OBS HIGH 75: CPT | Performed by: NURSE PRACTITIONER

## 2019-06-03 PROCEDURE — 96361 HYDRATE IV INFUSION ADD-ON: CPT

## 2019-06-03 PROCEDURE — 84484 ASSAY OF TROPONIN QUANT: CPT | Performed by: NURSE PRACTITIONER

## 2019-06-03 PROCEDURE — 84145 PROCALCITONIN (PCT): CPT | Performed by: EMERGENCY MEDICINE

## 2019-06-03 PROCEDURE — 85007 BL SMEAR W/DIFF WBC COUNT: CPT | Performed by: EMERGENCY MEDICINE

## 2019-06-03 PROCEDURE — 87086 URINE CULTURE/COLONY COUNT: CPT | Performed by: EMERGENCY MEDICINE

## 2019-06-03 PROCEDURE — 81003 URINALYSIS AUTO W/O SCOPE: CPT | Performed by: EMERGENCY MEDICINE

## 2019-06-03 PROCEDURE — 83880 ASSAY OF NATRIURETIC PEPTIDE: CPT | Performed by: EMERGENCY MEDICINE

## 2019-06-03 PROCEDURE — 96365 THER/PROPH/DIAG IV INF INIT: CPT

## 2019-06-03 PROCEDURE — 82948 REAGENT STRIP/BLOOD GLUCOSE: CPT

## 2019-06-03 PROCEDURE — 87086 URINE CULTURE/COLONY COUNT: CPT | Performed by: NURSE PRACTITIONER

## 2019-06-03 PROCEDURE — 93005 ELECTROCARDIOGRAM TRACING: CPT

## 2019-06-03 PROCEDURE — 87186 SC STD MICRODIL/AGAR DIL: CPT | Performed by: NURSE PRACTITIONER

## 2019-06-03 PROCEDURE — 99285 EMERGENCY DEPT VISIT HI MDM: CPT

## 2019-06-03 PROCEDURE — 87040 BLOOD CULTURE FOR BACTERIA: CPT | Performed by: EMERGENCY MEDICINE

## 2019-06-03 PROCEDURE — 82550 ASSAY OF CK (CPK): CPT | Performed by: EMERGENCY MEDICINE

## 2019-06-03 PROCEDURE — 85610 PROTHROMBIN TIME: CPT | Performed by: EMERGENCY MEDICINE

## 2019-06-03 PROCEDURE — 85027 COMPLETE CBC AUTOMATED: CPT | Performed by: EMERGENCY MEDICINE

## 2019-06-03 PROCEDURE — 93010 ELECTROCARDIOGRAM REPORT: CPT | Performed by: INTERNAL MEDICINE

## 2019-06-03 PROCEDURE — 87077 CULTURE AEROBIC IDENTIFY: CPT | Performed by: EMERGENCY MEDICINE

## 2019-06-03 PROCEDURE — 85730 THROMBOPLASTIN TIME PARTIAL: CPT | Performed by: EMERGENCY MEDICINE

## 2019-06-03 PROCEDURE — 0T9B70Z DRAINAGE OF BLADDER WITH DRAINAGE DEVICE, VIA NATURAL OR ARTIFICIAL OPENING: ICD-10-PCS | Performed by: EMERGENCY MEDICINE

## 2019-06-03 PROCEDURE — 36415 COLL VENOUS BLD VENIPUNCTURE: CPT | Performed by: EMERGENCY MEDICINE

## 2019-06-03 PROCEDURE — 80053 COMPREHEN METABOLIC PANEL: CPT | Performed by: EMERGENCY MEDICINE

## 2019-06-03 PROCEDURE — 84484 ASSAY OF TROPONIN QUANT: CPT | Performed by: EMERGENCY MEDICINE

## 2019-06-03 PROCEDURE — 83605 ASSAY OF LACTIC ACID: CPT | Performed by: NURSE PRACTITIONER

## 2019-06-03 PROCEDURE — 83605 ASSAY OF LACTIC ACID: CPT | Performed by: EMERGENCY MEDICINE

## 2019-06-03 PROCEDURE — 71045 X-RAY EXAM CHEST 1 VIEW: CPT

## 2019-06-03 PROCEDURE — 87040 BLOOD CULTURE FOR BACTERIA: CPT | Performed by: NURSE PRACTITIONER

## 2019-06-03 PROCEDURE — 0TPB70Z REMOVAL OF DRAINAGE DEVICE FROM BLADDER, VIA NATURAL OR ARTIFICIAL OPENING: ICD-10-PCS | Performed by: EMERGENCY MEDICINE

## 2019-06-03 PROCEDURE — 87186 SC STD MICRODIL/AGAR DIL: CPT | Performed by: EMERGENCY MEDICINE

## 2019-06-03 RX ORDER — SIMETHICONE 80 MG
80 TABLET,CHEWABLE ORAL EVERY 6 HOURS PRN
Status: DISCONTINUED | OUTPATIENT
Start: 2019-06-03 | End: 2019-06-07 | Stop reason: HOSPADM

## 2019-06-03 RX ORDER — PANTOPRAZOLE SODIUM 40 MG/1
40 TABLET, DELAYED RELEASE ORAL
Status: DISCONTINUED | OUTPATIENT
Start: 2019-06-04 | End: 2019-06-07 | Stop reason: HOSPADM

## 2019-06-03 RX ORDER — AMLODIPINE BESYLATE 5 MG/1
5 TABLET ORAL DAILY
Status: DISCONTINUED | OUTPATIENT
Start: 2019-06-03 | End: 2019-06-05

## 2019-06-03 RX ORDER — HEPARIN SODIUM 5000 [USP'U]/ML
5000 INJECTION, SOLUTION INTRAVENOUS; SUBCUTANEOUS EVERY 8 HOURS SCHEDULED
Status: DISCONTINUED | OUTPATIENT
Start: 2019-06-03 | End: 2019-06-07 | Stop reason: HOSPADM

## 2019-06-03 RX ORDER — CHOLECALCIFEROL (VITAMIN D3) 10 MCG
1 TABLET ORAL
Status: DISCONTINUED | OUTPATIENT
Start: 2019-06-03 | End: 2019-06-07 | Stop reason: HOSPADM

## 2019-06-03 RX ORDER — ACETAMINOPHEN 325 MG/1
650 TABLET ORAL EVERY 6 HOURS PRN
Status: DISCONTINUED | OUTPATIENT
Start: 2019-06-03 | End: 2019-06-07 | Stop reason: HOSPADM

## 2019-06-03 RX ORDER — ONDANSETRON 2 MG/ML
1 INJECTION INTRAMUSCULAR; INTRAVENOUS ONCE
Status: COMPLETED | OUTPATIENT
Start: 2019-06-03 | End: 2019-06-03

## 2019-06-03 RX ORDER — ASPIRIN 81 MG/1
81 TABLET, CHEWABLE ORAL DAILY
Status: DISCONTINUED | OUTPATIENT
Start: 2019-06-03 | End: 2019-06-07 | Stop reason: HOSPADM

## 2019-06-03 RX ORDER — VANCOMYCIN HYDROCHLORIDE 1 G/200ML
15 INJECTION, SOLUTION INTRAVENOUS ONCE
Status: COMPLETED | OUTPATIENT
Start: 2019-06-03 | End: 2019-06-03

## 2019-06-03 RX ORDER — B COMPLEX, C NO.20/FOLIC ACID 1 MG
1 CAPSULE ORAL DAILY
COMMUNITY
End: 2019-07-07 | Stop reason: HOSPADM

## 2019-06-03 RX ORDER — TAMSULOSIN HYDROCHLORIDE 0.4 MG/1
0.4 CAPSULE ORAL
Status: DISCONTINUED | OUTPATIENT
Start: 2019-06-03 | End: 2019-06-07 | Stop reason: HOSPADM

## 2019-06-03 RX ORDER — ONDANSETRON 2 MG/ML
4 INJECTION INTRAMUSCULAR; INTRAVENOUS EVERY 6 HOURS PRN
Status: DISCONTINUED | OUTPATIENT
Start: 2019-06-03 | End: 2019-06-07 | Stop reason: HOSPADM

## 2019-06-03 RX ORDER — ACETAMINOPHEN 325 MG/1
975 TABLET ORAL ONCE
Status: COMPLETED | OUTPATIENT
Start: 2019-06-03 | End: 2019-06-03

## 2019-06-03 RX ORDER — 0.9 % SODIUM CHLORIDE 0.9 %
3 VIAL (ML) INJECTION AS NEEDED
Status: DISCONTINUED | OUTPATIENT
Start: 2019-06-03 | End: 2019-06-07 | Stop reason: HOSPADM

## 2019-06-03 RX ORDER — SODIUM CHLORIDE 9 MG/ML
125 INJECTION, SOLUTION INTRAVENOUS CONTINUOUS
Status: DISCONTINUED | OUTPATIENT
Start: 2019-06-03 | End: 2019-06-07 | Stop reason: HOSPADM

## 2019-06-03 RX ADMIN — HEPARIN SODIUM 5000 UNITS: 5000 INJECTION, SOLUTION INTRAVENOUS; SUBCUTANEOUS at 16:39

## 2019-06-03 RX ADMIN — SODIUM CHLORIDE 1000 ML: 0.9 INJECTION, SOLUTION INTRAVENOUS at 12:11

## 2019-06-03 RX ADMIN — SODIUM CHLORIDE 125 ML/HR: 9 INJECTION, SOLUTION INTRAVENOUS at 16:39

## 2019-06-03 RX ADMIN — VANCOMYCIN HYDROCHLORIDE 1000 MG: 1 INJECTION, SOLUTION INTRAVENOUS at 12:49

## 2019-06-03 RX ADMIN — ACETAMINOPHEN 975 MG: 325 TABLET ORAL at 12:49

## 2019-06-03 RX ADMIN — HEPARIN SODIUM 5000 UNITS: 5000 INJECTION, SOLUTION INTRAVENOUS; SUBCUTANEOUS at 21:23

## 2019-06-03 RX ADMIN — SODIUM CHLORIDE 1000 ML: 0.9 INJECTION, SOLUTION INTRAVENOUS at 13:12

## 2019-06-03 RX ADMIN — INSULIN LISPRO 2 UNITS: 100 INJECTION, SOLUTION INTRAVENOUS; SUBCUTANEOUS at 16:39

## 2019-06-03 RX ADMIN — INSULIN LISPRO 2 UNITS: 100 INJECTION, SOLUTION INTRAVENOUS; SUBCUTANEOUS at 21:24

## 2019-06-03 RX ADMIN — INSULIN DETEMIR 10 UNITS: 100 INJECTION, SOLUTION SUBCUTANEOUS at 21:24

## 2019-06-03 RX ADMIN — PIPERACILLIN SODIUM AND TAZOBACTAM SODIUM 2.25 G: 2; .25 INJECTION, POWDER, LYOPHILIZED, FOR SOLUTION INTRAVENOUS at 19:40

## 2019-06-03 RX ADMIN — SODIUM CHLORIDE 1000 ML: 0.9 INJECTION, SOLUTION INTRAVENOUS at 12:48

## 2019-06-03 RX ADMIN — PIPERACILLIN SODIUM AND TAZOBACTAM SODIUM 4.5 G: 4; .5 INJECTION, POWDER, LYOPHILIZED, FOR SOLUTION INTRAVENOUS at 12:30

## 2019-06-04 ENCOUNTER — APPOINTMENT (INPATIENT)
Dept: CT IMAGING | Facility: HOSPITAL | Age: 79
DRG: 872 | End: 2019-06-04
Payer: COMMERCIAL

## 2019-06-04 LAB
ALBUMIN SERPL BCP-MCNC: 2.8 G/DL (ref 3.5–5.7)
ALP SERPL-CCNC: 75 U/L (ref 55–165)
ALT SERPL W P-5'-P-CCNC: 6 U/L (ref 7–52)
ANION GAP SERPL CALCULATED.3IONS-SCNC: 8 MMOL/L (ref 4–13)
AST SERPL W P-5'-P-CCNC: 7 U/L (ref 13–39)
BASOPHILS # BLD AUTO: 0.1 THOUSANDS/ΜL (ref 0–0.1)
BASOPHILS NFR BLD AUTO: 1 % (ref 0–2)
BILIRUB SERPL-MCNC: 0.8 MG/DL (ref 0.2–1)
BUN SERPL-MCNC: 30 MG/DL (ref 7–25)
CALCIUM SERPL-MCNC: 8.5 MG/DL (ref 8.6–10.5)
CHLORIDE SERPL-SCNC: 109 MMOL/L (ref 98–107)
CO2 SERPL-SCNC: 21 MMOL/L (ref 21–31)
CREAT SERPL-MCNC: 2.02 MG/DL (ref 0.7–1.3)
EOSINOPHIL # BLD AUTO: 0 THOUSAND/ΜL (ref 0–0.61)
EOSINOPHIL NFR BLD AUTO: 0 % (ref 0–5)
ERYTHROCYTE [DISTWIDTH] IN BLOOD BY AUTOMATED COUNT: 15.1 % (ref 11.5–14.5)
GFR SERPL CREATININE-BSD FRML MDRD: 30 ML/MIN/1.73SQ M
GLUCOSE SERPL-MCNC: 162 MG/DL (ref 65–140)
GLUCOSE SERPL-MCNC: 187 MG/DL (ref 65–99)
GLUCOSE SERPL-MCNC: 231 MG/DL (ref 65–140)
GLUCOSE SERPL-MCNC: 237 MG/DL (ref 65–140)
GLUCOSE SERPL-MCNC: 270 MG/DL (ref 65–140)
HCT VFR BLD AUTO: 28.2 % (ref 42–47)
HGB BLD-MCNC: 9.5 G/DL (ref 14–18)
LYMPHOCYTES # BLD AUTO: 0.7 THOUSANDS/ΜL (ref 0.6–4.47)
LYMPHOCYTES NFR BLD AUTO: 7 % (ref 21–51)
MCH RBC QN AUTO: 28.6 PG (ref 26–34)
MCHC RBC AUTO-ENTMCNC: 33.8 G/DL (ref 31–37)
MCV RBC AUTO: 85 FL (ref 81–99)
MONOCYTES # BLD AUTO: 0.7 THOUSAND/ΜL (ref 0.17–1.22)
MONOCYTES NFR BLD AUTO: 7 % (ref 2–12)
NEUTROPHILS # BLD AUTO: 8.4 THOUSANDS/ΜL (ref 1.4–6.5)
NEUTS SEG NFR BLD AUTO: 85 % (ref 42–75)
PLATELET # BLD AUTO: 223 THOUSANDS/UL (ref 149–390)
PMV BLD AUTO: 8 FL (ref 8.6–11.7)
POTASSIUM SERPL-SCNC: 4 MMOL/L (ref 3.5–5.5)
PROCALCITONIN SERPL-MCNC: 2.28 NG/ML
PROT SERPL-MCNC: 5.7 G/DL (ref 6.4–8.9)
RBC # BLD AUTO: 3.33 MILLION/UL (ref 4.3–5.9)
SODIUM SERPL-SCNC: 138 MMOL/L (ref 134–143)
WBC # BLD AUTO: 9.9 THOUSAND/UL (ref 4.8–10.8)

## 2019-06-04 PROCEDURE — 82948 REAGENT STRIP/BLOOD GLUCOSE: CPT

## 2019-06-04 PROCEDURE — 84145 PROCALCITONIN (PCT): CPT | Performed by: NURSE PRACTITIONER

## 2019-06-04 PROCEDURE — 99232 SBSQ HOSP IP/OBS MODERATE 35: CPT | Performed by: NURSE PRACTITIONER

## 2019-06-04 PROCEDURE — 85025 COMPLETE CBC W/AUTO DIFF WBC: CPT | Performed by: NURSE PRACTITIONER

## 2019-06-04 PROCEDURE — G8979 MOBILITY GOAL STATUS: HCPCS

## 2019-06-04 PROCEDURE — 97116 GAIT TRAINING THERAPY: CPT

## 2019-06-04 PROCEDURE — 97167 OT EVAL HIGH COMPLEX 60 MIN: CPT

## 2019-06-04 PROCEDURE — 80053 COMPREHEN METABOLIC PANEL: CPT | Performed by: NURSE PRACTITIONER

## 2019-06-04 PROCEDURE — G8988 SELF CARE GOAL STATUS: HCPCS

## 2019-06-04 PROCEDURE — 74176 CT ABD & PELVIS W/O CONTRAST: CPT

## 2019-06-04 PROCEDURE — 97163 PT EVAL HIGH COMPLEX 45 MIN: CPT

## 2019-06-04 PROCEDURE — G0427 INPT/ED TELECONSULT70: HCPCS | Performed by: INTERNAL MEDICINE

## 2019-06-04 PROCEDURE — G8978 MOBILITY CURRENT STATUS: HCPCS

## 2019-06-04 PROCEDURE — G8987 SELF CARE CURRENT STATUS: HCPCS

## 2019-06-04 RX ADMIN — AMLODIPINE BESYLATE 5 MG: 5 TABLET ORAL at 09:13

## 2019-06-04 RX ADMIN — SODIUM CHLORIDE 125 ML/HR: 9 INJECTION, SOLUTION INTRAVENOUS at 10:10

## 2019-06-04 RX ADMIN — INSULIN LISPRO 1 UNITS: 100 INJECTION, SOLUTION INTRAVENOUS; SUBCUTANEOUS at 08:09

## 2019-06-04 RX ADMIN — SODIUM CHLORIDE 125 ML/HR: 9 INJECTION, SOLUTION INTRAVENOUS at 22:42

## 2019-06-04 RX ADMIN — PANTOPRAZOLE SODIUM 40 MG: 40 TABLET, DELAYED RELEASE ORAL at 05:38

## 2019-06-04 RX ADMIN — PIPERACILLIN SODIUM AND TAZOBACTAM SODIUM 2.25 G: 2; .25 INJECTION, POWDER, LYOPHILIZED, FOR SOLUTION INTRAVENOUS at 06:38

## 2019-06-04 RX ADMIN — HEPARIN SODIUM 5000 UNITS: 5000 INJECTION, SOLUTION INTRAVENOUS; SUBCUTANEOUS at 22:42

## 2019-06-04 RX ADMIN — ASPIRIN 81 MG 81 MG: 81 TABLET ORAL at 09:13

## 2019-06-04 RX ADMIN — HEPARIN SODIUM 5000 UNITS: 5000 INJECTION, SOLUTION INTRAVENOUS; SUBCUTANEOUS at 05:38

## 2019-06-04 RX ADMIN — INSULIN LISPRO 2 UNITS: 100 INJECTION, SOLUTION INTRAVENOUS; SUBCUTANEOUS at 22:42

## 2019-06-04 RX ADMIN — HEPARIN SODIUM 5000 UNITS: 5000 INJECTION, SOLUTION INTRAVENOUS; SUBCUTANEOUS at 14:34

## 2019-06-04 RX ADMIN — INSULIN LISPRO 2 UNITS: 100 INJECTION, SOLUTION INTRAVENOUS; SUBCUTANEOUS at 11:38

## 2019-06-04 RX ADMIN — PIPERACILLIN SODIUM AND TAZOBACTAM SODIUM 2.25 G: 2; .25 INJECTION, POWDER, LYOPHILIZED, FOR SOLUTION INTRAVENOUS at 01:04

## 2019-06-04 RX ADMIN — INSULIN LISPRO 2 UNITS: 100 INJECTION, SOLUTION INTRAVENOUS; SUBCUTANEOUS at 16:19

## 2019-06-04 RX ADMIN — ERTAPENEM SODIUM 1000 MG: 1 INJECTION, POWDER, LYOPHILIZED, FOR SOLUTION INTRAMUSCULAR; INTRAVENOUS at 14:08

## 2019-06-04 RX ADMIN — TAMSULOSIN HYDROCHLORIDE 0.4 MG: 0.4 CAPSULE ORAL at 16:19

## 2019-06-04 RX ADMIN — Medication 1 CAPSULE: at 16:19

## 2019-06-04 RX ADMIN — INSULIN DETEMIR 10 UNITS: 100 INJECTION, SOLUTION SUBCUTANEOUS at 22:43

## 2019-06-04 RX ADMIN — SODIUM CHLORIDE 125 ML/HR: 9 INJECTION, SOLUTION INTRAVENOUS at 01:04

## 2019-06-05 ENCOUNTER — APPOINTMENT (INPATIENT)
Dept: ULTRASOUND IMAGING | Facility: HOSPITAL | Age: 79
DRG: 872 | End: 2019-06-05
Payer: COMMERCIAL

## 2019-06-05 LAB
ALBUMIN SERPL BCP-MCNC: 2.5 G/DL (ref 3.5–5.7)
ALP SERPL-CCNC: 73 U/L (ref 55–165)
ALT SERPL W P-5'-P-CCNC: 6 U/L (ref 7–52)
ANION GAP SERPL CALCULATED.3IONS-SCNC: 6 MMOL/L (ref 4–13)
AST SERPL W P-5'-P-CCNC: 8 U/L (ref 13–39)
BACTERIA UR CULT: ABNORMAL
BASOPHILS # BLD AUTO: 0 THOUSANDS/ΜL (ref 0–0.1)
BASOPHILS NFR BLD AUTO: 1 % (ref 0–2)
BILIRUB SERPL-MCNC: 0.4 MG/DL (ref 0.2–1)
BUN SERPL-MCNC: 26 MG/DL (ref 7–25)
CALCIUM SERPL-MCNC: 8 MG/DL (ref 8.6–10.5)
CHLORIDE SERPL-SCNC: 111 MMOL/L (ref 98–107)
CO2 SERPL-SCNC: 21 MMOL/L (ref 21–31)
CREAT SERPL-MCNC: 1.81 MG/DL (ref 0.7–1.3)
EOSINOPHIL # BLD AUTO: 0.1 THOUSAND/ΜL (ref 0–0.61)
EOSINOPHIL NFR BLD AUTO: 1 % (ref 0–5)
ERYTHROCYTE [DISTWIDTH] IN BLOOD BY AUTOMATED COUNT: 14.8 % (ref 11.5–14.5)
GFR SERPL CREATININE-BSD FRML MDRD: 35 ML/MIN/1.73SQ M
GLUCOSE SERPL-MCNC: 136 MG/DL (ref 65–140)
GLUCOSE SERPL-MCNC: 149 MG/DL (ref 65–99)
GLUCOSE SERPL-MCNC: 185 MG/DL (ref 65–140)
GLUCOSE SERPL-MCNC: 205 MG/DL (ref 65–140)
GLUCOSE SERPL-MCNC: 261 MG/DL (ref 65–140)
HCT VFR BLD AUTO: 23.2 % (ref 42–47)
HGB BLD-MCNC: 7.9 G/DL (ref 14–18)
LYMPHOCYTES # BLD AUTO: 1.1 THOUSANDS/ΜL (ref 0.6–4.47)
LYMPHOCYTES NFR BLD AUTO: 15 % (ref 21–51)
MCH RBC QN AUTO: 28.7 PG (ref 26–34)
MCHC RBC AUTO-ENTMCNC: 34.1 G/DL (ref 31–37)
MCV RBC AUTO: 84 FL (ref 81–99)
MONOCYTES # BLD AUTO: 0.8 THOUSAND/ΜL (ref 0.17–1.22)
MONOCYTES NFR BLD AUTO: 11 % (ref 2–12)
NEUTROPHILS # BLD AUTO: 5.5 THOUSANDS/ΜL (ref 1.4–6.5)
NEUTS SEG NFR BLD AUTO: 73 % (ref 42–75)
PLATELET # BLD AUTO: 196 THOUSANDS/UL (ref 149–390)
PMV BLD AUTO: 7.5 FL (ref 8.6–11.7)
POTASSIUM SERPL-SCNC: 4.2 MMOL/L (ref 3.5–5.5)
PROCALCITONIN SERPL-MCNC: 1.4 NG/ML
PROT SERPL-MCNC: 5.4 G/DL (ref 6.4–8.9)
RBC # BLD AUTO: 2.76 MILLION/UL (ref 4.3–5.9)
SODIUM SERPL-SCNC: 138 MMOL/L (ref 134–143)
WBC # BLD AUTO: 7.6 THOUSAND/UL (ref 4.8–10.8)

## 2019-06-05 PROCEDURE — 99232 SBSQ HOSP IP/OBS MODERATE 35: CPT | Performed by: NURSE PRACTITIONER

## 2019-06-05 PROCEDURE — 85025 COMPLETE CBC W/AUTO DIFF WBC: CPT | Performed by: NURSE PRACTITIONER

## 2019-06-05 PROCEDURE — 84145 PROCALCITONIN (PCT): CPT | Performed by: NURSE PRACTITIONER

## 2019-06-05 PROCEDURE — 76705 ECHO EXAM OF ABDOMEN: CPT

## 2019-06-05 PROCEDURE — 82948 REAGENT STRIP/BLOOD GLUCOSE: CPT

## 2019-06-05 PROCEDURE — 80053 COMPREHEN METABOLIC PANEL: CPT | Performed by: NURSE PRACTITIONER

## 2019-06-05 RX ORDER — AMLODIPINE BESYLATE 5 MG/1
5 TABLET ORAL ONCE
Status: COMPLETED | OUTPATIENT
Start: 2019-06-05 | End: 2019-06-05

## 2019-06-05 RX ORDER — AMLODIPINE BESYLATE 5 MG/1
10 TABLET ORAL DAILY
Status: DISCONTINUED | OUTPATIENT
Start: 2019-06-06 | End: 2019-06-07 | Stop reason: HOSPADM

## 2019-06-05 RX ADMIN — HEPARIN SODIUM 5000 UNITS: 5000 INJECTION, SOLUTION INTRAVENOUS; SUBCUTANEOUS at 05:36

## 2019-06-05 RX ADMIN — PANTOPRAZOLE SODIUM 40 MG: 40 TABLET, DELAYED RELEASE ORAL at 05:36

## 2019-06-05 RX ADMIN — INSULIN LISPRO 1 UNITS: 100 INJECTION, SOLUTION INTRAVENOUS; SUBCUTANEOUS at 17:12

## 2019-06-05 RX ADMIN — HEPARIN SODIUM 5000 UNITS: 5000 INJECTION, SOLUTION INTRAVENOUS; SUBCUTANEOUS at 21:34

## 2019-06-05 RX ADMIN — ERTAPENEM SODIUM 1000 MG: 1 INJECTION, POWDER, LYOPHILIZED, FOR SOLUTION INTRAMUSCULAR; INTRAVENOUS at 11:24

## 2019-06-05 RX ADMIN — AMLODIPINE BESYLATE 5 MG: 5 TABLET ORAL at 07:58

## 2019-06-05 RX ADMIN — HEPARIN SODIUM 5000 UNITS: 5000 INJECTION, SOLUTION INTRAVENOUS; SUBCUTANEOUS at 12:48

## 2019-06-05 RX ADMIN — INSULIN DETEMIR 10 UNITS: 100 INJECTION, SOLUTION SUBCUTANEOUS at 21:34

## 2019-06-05 RX ADMIN — AMLODIPINE BESYLATE 5 MG: 5 TABLET ORAL at 22:30

## 2019-06-05 RX ADMIN — INSULIN LISPRO 2 UNITS: 100 INJECTION, SOLUTION INTRAVENOUS; SUBCUTANEOUS at 21:35

## 2019-06-05 RX ADMIN — SODIUM CHLORIDE 125 ML/HR: 9 INJECTION, SOLUTION INTRAVENOUS at 17:24

## 2019-06-05 RX ADMIN — SODIUM CHLORIDE 125 ML/HR: 9 INJECTION, SOLUTION INTRAVENOUS at 07:57

## 2019-06-05 RX ADMIN — ASPIRIN 81 MG 81 MG: 81 TABLET ORAL at 07:57

## 2019-06-05 RX ADMIN — Medication 1 CAPSULE: at 17:12

## 2019-06-05 RX ADMIN — TAMSULOSIN HYDROCHLORIDE 0.4 MG: 0.4 CAPSULE ORAL at 17:12

## 2019-06-06 LAB
ALBUMIN SERPL BCP-MCNC: 2.6 G/DL (ref 3.5–5.7)
ALP SERPL-CCNC: 87 U/L (ref 55–165)
ALT SERPL W P-5'-P-CCNC: 6 U/L (ref 7–52)
ANION GAP SERPL CALCULATED.3IONS-SCNC: 8 MMOL/L (ref 4–13)
AST SERPL W P-5'-P-CCNC: 9 U/L (ref 13–39)
BACTERIA BLD CULT: ABNORMAL
BASOPHILS # BLD AUTO: 0.1 THOUSANDS/ΜL (ref 0–0.1)
BASOPHILS NFR BLD AUTO: 1 % (ref 0–2)
BILIRUB SERPL-MCNC: 0.4 MG/DL (ref 0.2–1)
BUN SERPL-MCNC: 21 MG/DL (ref 7–25)
CALCIUM SERPL-MCNC: 8.2 MG/DL (ref 8.6–10.5)
CHLORIDE SERPL-SCNC: 112 MMOL/L (ref 98–107)
CO2 SERPL-SCNC: 21 MMOL/L (ref 21–31)
CREAT SERPL-MCNC: 1.67 MG/DL (ref 0.7–1.3)
EOSINOPHIL # BLD AUTO: 0.1 THOUSAND/ΜL (ref 0–0.61)
EOSINOPHIL NFR BLD AUTO: 1 % (ref 0–5)
ERYTHROCYTE [DISTWIDTH] IN BLOOD BY AUTOMATED COUNT: 15.1 % (ref 11.5–14.5)
GFR SERPL CREATININE-BSD FRML MDRD: 38 ML/MIN/1.73SQ M
GLUCOSE SERPL-MCNC: 128 MG/DL (ref 65–140)
GLUCOSE SERPL-MCNC: 145 MG/DL (ref 65–99)
GLUCOSE SERPL-MCNC: 228 MG/DL (ref 65–140)
GLUCOSE SERPL-MCNC: 230 MG/DL (ref 65–140)
GLUCOSE SERPL-MCNC: 284 MG/DL (ref 65–140)
GRAM STN SPEC: ABNORMAL
HCT VFR BLD AUTO: 23.5 % (ref 42–47)
HGB BLD-MCNC: 7.9 G/DL (ref 14–18)
LYMPHOCYTES # BLD AUTO: 1 THOUSANDS/ΜL (ref 0.6–4.47)
LYMPHOCYTES NFR BLD AUTO: 15 % (ref 21–51)
MCH RBC QN AUTO: 28.4 PG (ref 26–34)
MCHC RBC AUTO-ENTMCNC: 33.8 G/DL (ref 31–37)
MCV RBC AUTO: 84 FL (ref 81–99)
MONOCYTES # BLD AUTO: 0.6 THOUSAND/ΜL (ref 0.17–1.22)
MONOCYTES NFR BLD AUTO: 9 % (ref 2–12)
NEUTROPHILS # BLD AUTO: 4.9 THOUSANDS/ΜL (ref 1.4–6.5)
NEUTS SEG NFR BLD AUTO: 74 % (ref 42–75)
PLATELET # BLD AUTO: 204 THOUSANDS/UL (ref 149–390)
PMV BLD AUTO: 8 FL (ref 8.6–11.7)
POTASSIUM SERPL-SCNC: 4.3 MMOL/L (ref 3.5–5.5)
PROT SERPL-MCNC: 5.6 G/DL (ref 6.4–8.9)
RBC # BLD AUTO: 2.8 MILLION/UL (ref 4.3–5.9)
SODIUM SERPL-SCNC: 141 MMOL/L (ref 134–143)
WBC # BLD AUTO: 6.6 THOUSAND/UL (ref 4.8–10.8)

## 2019-06-06 PROCEDURE — 82948 REAGENT STRIP/BLOOD GLUCOSE: CPT

## 2019-06-06 PROCEDURE — 97530 THERAPEUTIC ACTIVITIES: CPT

## 2019-06-06 PROCEDURE — 99233 SBSQ HOSP IP/OBS HIGH 50: CPT | Performed by: NURSE PRACTITIONER

## 2019-06-06 PROCEDURE — 80053 COMPREHEN METABOLIC PANEL: CPT | Performed by: NURSE PRACTITIONER

## 2019-06-06 PROCEDURE — 85025 COMPLETE CBC W/AUTO DIFF WBC: CPT | Performed by: NURSE PRACTITIONER

## 2019-06-06 RX ADMIN — ERTAPENEM SODIUM 1000 MG: 1 INJECTION, POWDER, LYOPHILIZED, FOR SOLUTION INTRAMUSCULAR; INTRAVENOUS at 15:39

## 2019-06-06 RX ADMIN — INSULIN DETEMIR 10 UNITS: 100 INJECTION, SOLUTION SUBCUTANEOUS at 23:24

## 2019-06-06 RX ADMIN — SODIUM CHLORIDE 125 ML/HR: 9 INJECTION, SOLUTION INTRAVENOUS at 09:53

## 2019-06-06 RX ADMIN — TAMSULOSIN HYDROCHLORIDE 0.4 MG: 0.4 CAPSULE ORAL at 17:54

## 2019-06-06 RX ADMIN — HEPARIN SODIUM 5000 UNITS: 5000 INJECTION, SOLUTION INTRAVENOUS; SUBCUTANEOUS at 23:24

## 2019-06-06 RX ADMIN — HEPARIN SODIUM 5000 UNITS: 5000 INJECTION, SOLUTION INTRAVENOUS; SUBCUTANEOUS at 05:28

## 2019-06-06 RX ADMIN — PANTOPRAZOLE SODIUM 40 MG: 40 TABLET, DELAYED RELEASE ORAL at 05:28

## 2019-06-06 RX ADMIN — INSULIN LISPRO 2 UNITS: 100 INJECTION, SOLUTION INTRAVENOUS; SUBCUTANEOUS at 12:21

## 2019-06-06 RX ADMIN — Medication 1 CAPSULE: at 15:38

## 2019-06-06 RX ADMIN — ASPIRIN 81 MG 81 MG: 81 TABLET ORAL at 09:53

## 2019-06-06 RX ADMIN — INSULIN LISPRO 2 UNITS: 100 INJECTION, SOLUTION INTRAVENOUS; SUBCUTANEOUS at 23:24

## 2019-06-06 RX ADMIN — INSULIN LISPRO 2 UNITS: 100 INJECTION, SOLUTION INTRAVENOUS; SUBCUTANEOUS at 15:39

## 2019-06-06 RX ADMIN — SODIUM CHLORIDE 125 ML/HR: 9 INJECTION, SOLUTION INTRAVENOUS at 23:38

## 2019-06-06 RX ADMIN — AMLODIPINE BESYLATE 10 MG: 5 TABLET ORAL at 09:53

## 2019-06-06 RX ADMIN — HEPARIN SODIUM 5000 UNITS: 5000 INJECTION, SOLUTION INTRAVENOUS; SUBCUTANEOUS at 15:39

## 2019-06-07 ENCOUNTER — HOSPITAL ENCOUNTER (INPATIENT)
Facility: HOSPITAL | Age: 79
LOS: 17 days | Discharge: HOME HEALTH | DRG: 690 | End: 2019-06-24
Attending: INTERNAL MEDICINE | Admitting: INTERNAL MEDICINE
Payer: COMMERCIAL

## 2019-06-07 VITALS
DIASTOLIC BLOOD PRESSURE: 82 MMHG | SYSTOLIC BLOOD PRESSURE: 171 MMHG | TEMPERATURE: 98.4 F | OXYGEN SATURATION: 98 % | HEIGHT: 68 IN | HEART RATE: 69 BPM | BODY MASS INDEX: 24.96 KG/M2 | WEIGHT: 164.68 LBS | RESPIRATION RATE: 18 BRPM

## 2019-06-07 DIAGNOSIS — D64.9 ANEMIA, UNSPECIFIED TYPE: ICD-10-CM

## 2019-06-07 DIAGNOSIS — N30.00 ACUTE CYSTITIS WITHOUT HEMATURIA: ICD-10-CM

## 2019-06-07 DIAGNOSIS — E11.8 TYPE 2 DIABETES MELLITUS WITH COMPLICATION, WITH LONG-TERM CURRENT USE OF INSULIN (HCC): Chronic | ICD-10-CM

## 2019-06-07 DIAGNOSIS — Z79.4 TYPE 2 DIABETES MELLITUS WITH COMPLICATION, WITH LONG-TERM CURRENT USE OF INSULIN (HCC): Chronic | ICD-10-CM

## 2019-06-07 DIAGNOSIS — N17.9 ACUTE KIDNEY INJURY SUPERIMPOSED ON CHRONIC KIDNEY DISEASE (HCC): Chronic | ICD-10-CM

## 2019-06-07 DIAGNOSIS — N18.9 ACUTE KIDNEY INJURY SUPERIMPOSED ON CHRONIC KIDNEY DISEASE (HCC): Chronic | ICD-10-CM

## 2019-06-07 DIAGNOSIS — N39.0 URINARY TRACT INFECTION WITHOUT HEMATURIA, SITE UNSPECIFIED: Primary | ICD-10-CM

## 2019-06-07 PROBLEM — E44.0 MODERATE PROTEIN-CALORIE MALNUTRITION (HCC): Chronic | Status: ACTIVE | Noted: 2019-06-07

## 2019-06-07 LAB
GLUCOSE SERPL-MCNC: 136 MG/DL (ref 65–140)
GLUCOSE SERPL-MCNC: 272 MG/DL (ref 65–140)
PROCALCITONIN SERPL-MCNC: 0.32 NG/ML

## 2019-06-07 PROCEDURE — 99239 HOSP IP/OBS DSCHRG MGMT >30: CPT | Performed by: NURSE PRACTITIONER

## 2019-06-07 PROCEDURE — 84145 PROCALCITONIN (PCT): CPT | Performed by: NURSE PRACTITIONER

## 2019-06-07 PROCEDURE — 82948 REAGENT STRIP/BLOOD GLUCOSE: CPT

## 2019-06-07 RX ORDER — AMLODIPINE BESYLATE 2.5 MG/1
10 TABLET ORAL DAILY
Qty: 90 TABLET | Refills: 0 | Status: ON HOLD | OUTPATIENT
Start: 2019-06-07 | End: 2019-09-04

## 2019-06-07 RX ADMIN — SODIUM CHLORIDE 125 ML/HR: 9 INJECTION, SOLUTION INTRAVENOUS at 11:32

## 2019-06-07 RX ADMIN — AMLODIPINE BESYLATE 10 MG: 5 TABLET ORAL at 09:39

## 2019-06-07 RX ADMIN — HEPARIN SODIUM 5000 UNITS: 5000 INJECTION, SOLUTION INTRAVENOUS; SUBCUTANEOUS at 05:06

## 2019-06-07 RX ADMIN — PANTOPRAZOLE SODIUM 40 MG: 40 TABLET, DELAYED RELEASE ORAL at 05:06

## 2019-06-07 RX ADMIN — ERTAPENEM SODIUM 1000 MG: 1 INJECTION, POWDER, LYOPHILIZED, FOR SOLUTION INTRAMUSCULAR; INTRAVENOUS at 12:21

## 2019-06-07 RX ADMIN — INSULIN LISPRO 2 UNITS: 100 INJECTION, SOLUTION INTRAVENOUS; SUBCUTANEOUS at 11:51

## 2019-06-07 RX ADMIN — ASPIRIN 81 MG 81 MG: 81 TABLET ORAL at 09:39

## 2019-06-08 LAB
BACTERIA BLD CULT: NORMAL
GLUCOSE SERPL-MCNC: 139 MG/DL (ref 65–140)
GLUCOSE SERPL-MCNC: 200 MG/DL (ref 65–140)
GLUCOSE SERPL-MCNC: 244 MG/DL (ref 65–140)

## 2019-06-08 PROCEDURE — 82948 REAGENT STRIP/BLOOD GLUCOSE: CPT

## 2019-06-09 LAB
GLUCOSE SERPL-MCNC: 121 MG/DL (ref 65–140)
GLUCOSE SERPL-MCNC: 268 MG/DL (ref 65–140)
GLUCOSE SERPL-MCNC: 306 MG/DL (ref 65–140)

## 2019-06-09 PROCEDURE — 82948 REAGENT STRIP/BLOOD GLUCOSE: CPT

## 2019-06-10 ENCOUNTER — HOSPITAL ENCOUNTER (OUTPATIENT)
Dept: INFUSION CENTER | Facility: HOSPITAL | Age: 79
Discharge: HOME | End: 2019-06-10

## 2019-06-10 DIAGNOSIS — A41.51 SEPSIS DUE TO ESCHERICHIA COLI (HCC): Primary | Chronic | ICD-10-CM

## 2019-06-10 LAB
GLUCOSE SERPL-MCNC: 134 MG/DL (ref 65–140)
GLUCOSE SERPL-MCNC: 234 MG/DL (ref 65–140)
GLUCOSE SERPL-MCNC: 270 MG/DL (ref 65–140)

## 2019-06-10 PROCEDURE — 82948 REAGENT STRIP/BLOOD GLUCOSE: CPT

## 2019-06-11 LAB
GLUCOSE SERPL-MCNC: 152 MG/DL (ref 65–140)
GLUCOSE SERPL-MCNC: 207 MG/DL (ref 65–140)
GLUCOSE SERPL-MCNC: 354 MG/DL (ref 65–140)

## 2019-06-11 PROCEDURE — 82948 REAGENT STRIP/BLOOD GLUCOSE: CPT

## 2019-06-12 LAB
GLUCOSE SERPL-MCNC: 201 MG/DL (ref 65–140)
GLUCOSE SERPL-MCNC: 242 MG/DL (ref 65–140)
GLUCOSE SERPL-MCNC: 265 MG/DL (ref 65–140)

## 2019-06-12 PROCEDURE — 82948 REAGENT STRIP/BLOOD GLUCOSE: CPT

## 2019-06-13 LAB
GLUCOSE SERPL-MCNC: 114 MG/DL (ref 65–140)
GLUCOSE SERPL-MCNC: 153 MG/DL (ref 65–140)
GLUCOSE SERPL-MCNC: 299 MG/DL (ref 65–140)
URATE SERPL-MCNC: 5.6 MG/DL (ref 2.3–7.6)

## 2019-06-13 PROCEDURE — 82948 REAGENT STRIP/BLOOD GLUCOSE: CPT

## 2019-06-13 PROCEDURE — 84550 ASSAY OF BLOOD/URIC ACID: CPT | Performed by: FAMILY MEDICINE

## 2019-06-14 ENCOUNTER — TELEPHONE (OUTPATIENT)
Dept: INFECTIOUS DISEASES | Facility: CLINIC | Age: 79
End: 2019-06-14

## 2019-06-14 LAB
25(OH)D3 SERPL-MCNC: 11.2 NG/ML (ref 30–100)
EST. AVERAGE GLUCOSE BLD GHB EST-MCNC: 186 MG/DL
GLUCOSE SERPL-MCNC: 159 MG/DL (ref 65–140)
GLUCOSE SERPL-MCNC: 262 MG/DL (ref 65–140)
GLUCOSE SERPL-MCNC: 351 MG/DL (ref 65–140)
HBA1C MFR BLD: 8.1 % (ref 4.2–6.3)
VIT B12 SERPL-MCNC: 1360 PG/ML (ref 100–900)

## 2019-06-14 PROCEDURE — 82306 VITAMIN D 25 HYDROXY: CPT | Performed by: FAMILY MEDICINE

## 2019-06-14 PROCEDURE — 83036 HEMOGLOBIN GLYCOSYLATED A1C: CPT | Performed by: FAMILY MEDICINE

## 2019-06-14 PROCEDURE — 82948 REAGENT STRIP/BLOOD GLUCOSE: CPT

## 2019-06-14 PROCEDURE — 82607 VITAMIN B-12: CPT | Performed by: FAMILY MEDICINE

## 2019-06-15 LAB
ALBUMIN SERPL BCP-MCNC: 3 G/DL (ref 3.5–5.7)
ALP SERPL-CCNC: 125 U/L (ref 55–165)
ALT SERPL W P-5'-P-CCNC: 12 U/L (ref 7–52)
ANION GAP SERPL CALCULATED.3IONS-SCNC: 9 MMOL/L (ref 4–13)
AST SERPL W P-5'-P-CCNC: 12 U/L (ref 13–39)
BASOPHILS # BLD AUTO: 0.1 THOUSANDS/ΜL (ref 0–0.1)
BASOPHILS NFR BLD AUTO: 1 % (ref 0–2)
BILIRUB SERPL-MCNC: 0.6 MG/DL (ref 0.2–1)
BUN SERPL-MCNC: 30 MG/DL (ref 7–25)
CALCIUM SERPL-MCNC: 8.8 MG/DL (ref 8.6–10.5)
CHLORIDE SERPL-SCNC: 100 MMOL/L (ref 98–107)
CO2 SERPL-SCNC: 27 MMOL/L (ref 21–31)
CREAT SERPL-MCNC: 1.66 MG/DL (ref 0.7–1.3)
EOSINOPHIL # BLD AUTO: 0.2 THOUSAND/ΜL (ref 0–0.61)
EOSINOPHIL NFR BLD AUTO: 3 % (ref 0–5)
ERYTHROCYTE [DISTWIDTH] IN BLOOD BY AUTOMATED COUNT: 15.6 % (ref 11.5–14.5)
GFR SERPL CREATININE-BSD FRML MDRD: 38 ML/MIN/1.73SQ M
GLUCOSE SERPL-MCNC: 162 MG/DL (ref 65–99)
GLUCOSE SERPL-MCNC: 167 MG/DL (ref 65–140)
GLUCOSE SERPL-MCNC: 238 MG/DL (ref 65–140)
GLUCOSE SERPL-MCNC: 284 MG/DL (ref 65–140)
HCT VFR BLD AUTO: 26.8 % (ref 42–47)
HGB BLD-MCNC: 9 G/DL (ref 14–18)
LYMPHOCYTES # BLD AUTO: 1.3 THOUSANDS/ΜL (ref 0.6–4.47)
LYMPHOCYTES NFR BLD AUTO: 20 % (ref 21–51)
MCH RBC QN AUTO: 28.5 PG (ref 26–34)
MCHC RBC AUTO-ENTMCNC: 33.5 G/DL (ref 31–37)
MCV RBC AUTO: 85 FL (ref 81–99)
MONOCYTES # BLD AUTO: 0.6 THOUSAND/ΜL (ref 0.17–1.22)
MONOCYTES NFR BLD AUTO: 10 % (ref 2–12)
NEUTROPHILS # BLD AUTO: 4.3 THOUSANDS/ΜL (ref 1.4–6.5)
NEUTS SEG NFR BLD AUTO: 65 % (ref 42–75)
PLATELET # BLD AUTO: 183 THOUSANDS/UL (ref 149–390)
PMV BLD AUTO: 8.4 FL (ref 8.6–11.7)
POTASSIUM SERPL-SCNC: 4.2 MMOL/L (ref 3.5–5.5)
PROT SERPL-MCNC: 6.4 G/DL (ref 6.4–8.9)
RBC # BLD AUTO: 3.15 MILLION/UL (ref 4.3–5.9)
SODIUM SERPL-SCNC: 136 MMOL/L (ref 134–143)
WBC # BLD AUTO: 6.6 THOUSAND/UL (ref 4.8–10.8)

## 2019-06-15 PROCEDURE — 80053 COMPREHEN METABOLIC PANEL: CPT | Performed by: FAMILY MEDICINE

## 2019-06-15 PROCEDURE — 85025 COMPLETE CBC W/AUTO DIFF WBC: CPT | Performed by: FAMILY MEDICINE

## 2019-06-15 PROCEDURE — 82948 REAGENT STRIP/BLOOD GLUCOSE: CPT

## 2019-06-16 LAB
GLUCOSE SERPL-MCNC: 127 MG/DL (ref 65–140)
GLUCOSE SERPL-MCNC: 208 MG/DL (ref 65–140)
GLUCOSE SERPL-MCNC: 212 MG/DL (ref 65–140)

## 2019-06-16 PROCEDURE — 82948 REAGENT STRIP/BLOOD GLUCOSE: CPT

## 2019-06-17 ENCOUNTER — OFFICE VISIT (OUTPATIENT)
Dept: INFECTIOUS DISEASES | Facility: HOSPITAL | Age: 79
End: 2019-06-17
Payer: COMMERCIAL

## 2019-06-17 VITALS
SYSTOLIC BLOOD PRESSURE: 134 MMHG | HEART RATE: 81 BPM | BODY MASS INDEX: 25.04 KG/M2 | DIASTOLIC BLOOD PRESSURE: 82 MMHG | TEMPERATURE: 98.1 F | HEIGHT: 68 IN

## 2019-06-17 DIAGNOSIS — Z16.12 INFECTION DUE TO ESBL-PRODUCING ESCHERICHIA COLI: ICD-10-CM

## 2019-06-17 DIAGNOSIS — N30.00 ACUTE CYSTITIS WITHOUT HEMATURIA: ICD-10-CM

## 2019-06-17 DIAGNOSIS — E34.0 CARCINOID SYNDROME (HCC): ICD-10-CM

## 2019-06-17 DIAGNOSIS — A49.8 INFECTION DUE TO ESBL-PRODUCING ESCHERICHIA COLI: ICD-10-CM

## 2019-06-17 DIAGNOSIS — A41.51 SEPSIS DUE TO ESCHERICHIA COLI (HCC): Primary | Chronic | ICD-10-CM

## 2019-06-17 DIAGNOSIS — K83.8 BILIARY SLUDGE DETERMINED BY ULTRASOUND: ICD-10-CM

## 2019-06-17 LAB
BACTERIA UR QL AUTO: ABNORMAL /HPF
BILIRUB UR QL STRIP: NEGATIVE
CLARITY UR: ABNORMAL
COLOR UR: YELLOW
GLUCOSE SERPL-MCNC: 116 MG/DL (ref 65–140)
GLUCOSE SERPL-MCNC: 156 MG/DL (ref 65–140)
GLUCOSE SERPL-MCNC: 344 MG/DL (ref 65–140)
GLUCOSE UR STRIP-MCNC: ABNORMAL MG/DL
HGB UR QL STRIP.AUTO: ABNORMAL
KETONES UR STRIP-MCNC: NEGATIVE MG/DL
LEUKOCYTE ESTERASE UR QL STRIP: ABNORMAL
NITRITE UR QL STRIP: NEGATIVE
NON-SQ EPI CELLS URNS QL MICRO: ABNORMAL /HPF
OTHER STN SPEC: ABNORMAL
PH UR STRIP.AUTO: 5.5 [PH]
PROT UR STRIP-MCNC: NEGATIVE MG/DL
RBC #/AREA URNS AUTO: ABNORMAL /HPF
SP GR UR STRIP.AUTO: <=1.005 (ref 1–1.03)
UROBILINOGEN UR QL STRIP.AUTO: 0.2 E.U./DL
WBC #/AREA URNS AUTO: ABNORMAL /HPF

## 2019-06-17 PROCEDURE — 87077 CULTURE AEROBIC IDENTIFY: CPT | Performed by: INTERNAL MEDICINE

## 2019-06-17 PROCEDURE — 81001 URINALYSIS AUTO W/SCOPE: CPT | Performed by: INTERNAL MEDICINE

## 2019-06-17 PROCEDURE — 87086 URINE CULTURE/COLONY COUNT: CPT | Performed by: INTERNAL MEDICINE

## 2019-06-17 PROCEDURE — 82948 REAGENT STRIP/BLOOD GLUCOSE: CPT

## 2019-06-17 PROCEDURE — 99214 OFFICE O/P EST MOD 30 MIN: CPT | Performed by: INTERNAL MEDICINE

## 2019-06-17 RX ORDER — GRANULES FOR ORAL 3 G/1
3 POWDER ORAL
Qty: 3 G | Refills: 2 | Status: SHIPPED | OUTPATIENT
Start: 2019-06-17 | End: 2019-07-07 | Stop reason: HOSPADM

## 2019-06-18 ENCOUNTER — NURSING HOME VISIT (OUTPATIENT)
Dept: SURGERY | Facility: CLINIC | Age: 79
End: 2019-06-18
Payer: COMMERCIAL

## 2019-06-18 DIAGNOSIS — K83.8 BILIARY SLUDGE: Primary | ICD-10-CM

## 2019-06-18 LAB
GLUCOSE SERPL-MCNC: 154 MG/DL (ref 65–140)
GLUCOSE SERPL-MCNC: 234 MG/DL (ref 65–140)

## 2019-06-18 PROCEDURE — 82948 REAGENT STRIP/BLOOD GLUCOSE: CPT

## 2019-06-18 PROCEDURE — 99304 1ST NF CARE SF/LOW MDM 25: CPT | Performed by: SURGERY

## 2019-06-19 PROBLEM — R19.7 DIARRHEA: Chronic | Status: RESOLVED | Noted: 2019-01-23 | Resolved: 2019-06-19

## 2019-06-19 LAB
BACTERIA UR CULT: ABNORMAL
GLUCOSE SERPL-MCNC: 139 MG/DL (ref 65–140)
GLUCOSE SERPL-MCNC: 157 MG/DL (ref 65–140)
GLUCOSE SERPL-MCNC: 252 MG/DL (ref 65–140)
GLUCOSE SERPL-MCNC: 284 MG/DL (ref 65–140)

## 2019-06-19 PROCEDURE — 82948 REAGENT STRIP/BLOOD GLUCOSE: CPT

## 2019-06-20 ENCOUNTER — TELEPHONE (OUTPATIENT)
Dept: INFECTIOUS DISEASES | Facility: CLINIC | Age: 79
End: 2019-06-20

## 2019-06-20 LAB
GLUCOSE SERPL-MCNC: 157 MG/DL (ref 65–140)
GLUCOSE SERPL-MCNC: 179 MG/DL (ref 65–140)
GLUCOSE SERPL-MCNC: 260 MG/DL (ref 65–140)

## 2019-06-20 PROCEDURE — 82948 REAGENT STRIP/BLOOD GLUCOSE: CPT

## 2019-06-21 LAB
GLUCOSE SERPL-MCNC: 121 MG/DL (ref 65–140)
GLUCOSE SERPL-MCNC: 186 MG/DL (ref 65–140)

## 2019-06-21 PROCEDURE — 82948 REAGENT STRIP/BLOOD GLUCOSE: CPT

## 2019-06-22 LAB
GLUCOSE SERPL-MCNC: 149 MG/DL (ref 65–140)
GLUCOSE SERPL-MCNC: 288 MG/DL (ref 65–140)
GLUCOSE SERPL-MCNC: 312 MG/DL (ref 65–140)
GLUCOSE SERPL-MCNC: 315 MG/DL (ref 65–140)

## 2019-06-22 PROCEDURE — 82948 REAGENT STRIP/BLOOD GLUCOSE: CPT

## 2019-06-23 LAB
GLUCOSE SERPL-MCNC: 136 MG/DL (ref 65–140)
GLUCOSE SERPL-MCNC: 259 MG/DL (ref 65–140)
GLUCOSE SERPL-MCNC: 374 MG/DL (ref 65–140)

## 2019-06-23 PROCEDURE — 82948 REAGENT STRIP/BLOOD GLUCOSE: CPT

## 2019-06-24 LAB
BACTERIA UR QL AUTO: ABNORMAL /HPF
BILIRUB UR QL STRIP: NEGATIVE
CLARITY UR: ABNORMAL
COLOR UR: YELLOW
GLUCOSE SERPL-MCNC: 146 MG/DL (ref 65–140)
GLUCOSE SERPL-MCNC: 180 MG/DL (ref 65–140)
GLUCOSE SERPL-MCNC: 340 MG/DL (ref 65–140)
GLUCOSE UR STRIP-MCNC: NEGATIVE MG/DL
HGB UR QL STRIP.AUTO: ABNORMAL
KETONES UR STRIP-MCNC: NEGATIVE MG/DL
LEUKOCYTE ESTERASE UR QL STRIP: ABNORMAL
NITRITE UR QL STRIP: NEGATIVE
NON-SQ EPI CELLS URNS QL MICRO: ABNORMAL /HPF
OTHER STN SPEC: ABNORMAL
PH UR STRIP.AUTO: 5.5 [PH]
PROT UR STRIP-MCNC: ABNORMAL MG/DL
RBC #/AREA URNS AUTO: ABNORMAL /HPF
SP GR UR STRIP.AUTO: 1.01 (ref 1–1.03)
UROBILINOGEN UR QL STRIP.AUTO: 0.2 E.U./DL
WBC #/AREA URNS AUTO: ABNORMAL /HPF

## 2019-06-24 PROCEDURE — 87186 SC STD MICRODIL/AGAR DIL: CPT | Performed by: FAMILY MEDICINE

## 2019-06-24 PROCEDURE — 87106 FUNGI IDENTIFICATION YEAST: CPT | Performed by: FAMILY MEDICINE

## 2019-06-24 PROCEDURE — 87077 CULTURE AEROBIC IDENTIFY: CPT | Performed by: FAMILY MEDICINE

## 2019-06-24 PROCEDURE — 81001 URINALYSIS AUTO W/SCOPE: CPT | Performed by: FAMILY MEDICINE

## 2019-06-24 PROCEDURE — 82948 REAGENT STRIP/BLOOD GLUCOSE: CPT

## 2019-06-24 PROCEDURE — 87086 URINE CULTURE/COLONY COUNT: CPT | Performed by: FAMILY MEDICINE

## 2019-06-25 ENCOUNTER — TRANSCRIBE ORDERS (OUTPATIENT)
Dept: LAB | Facility: CLINIC | Age: 79
End: 2019-06-25

## 2019-06-25 ENCOUNTER — LAB (OUTPATIENT)
Dept: LAB | Facility: CLINIC | Age: 79
End: 2019-06-25
Payer: COMMERCIAL

## 2019-06-25 DIAGNOSIS — R80.9 PROTEINURIA, UNSPECIFIED TYPE: ICD-10-CM

## 2019-06-25 DIAGNOSIS — C7B.8 NEUROENDOCRINE CARCINOMA METASTATIC TO LIVER (HCC): ICD-10-CM

## 2019-06-25 DIAGNOSIS — D64.9 ANEMIA, UNSPECIFIED TYPE: ICD-10-CM

## 2019-06-25 DIAGNOSIS — N18.9 CHRONIC KIDNEY DISEASE, UNSPECIFIED CKD STAGE: Primary | ICD-10-CM

## 2019-06-25 DIAGNOSIS — N18.9 CHRONIC KIDNEY DISEASE, UNSPECIFIED CKD STAGE: ICD-10-CM

## 2019-06-25 DIAGNOSIS — C7A.8 NEUROENDOCRINE CARCINOMA METASTATIC TO LIVER (HCC): ICD-10-CM

## 2019-06-25 LAB
ALBUMIN SERPL BCP-MCNC: 3.2 G/DL (ref 3.5–5)
ALP SERPL-CCNC: 135 U/L (ref 46–116)
ALT SERPL W P-5'-P-CCNC: 23 U/L (ref 12–78)
ANION GAP SERPL CALCULATED.3IONS-SCNC: 4 MMOL/L (ref 4–13)
AST SERPL W P-5'-P-CCNC: 11 U/L (ref 5–45)
BACTERIA UR QL AUTO: ABNORMAL /HPF
BASOPHILS # BLD AUTO: 0.1 THOUSANDS/ΜL (ref 0–0.1)
BASOPHILS NFR BLD AUTO: 1 % (ref 0–1)
BILIRUB SERPL-MCNC: 0.44 MG/DL (ref 0.2–1)
BILIRUB UR QL STRIP: NEGATIVE
BUN SERPL-MCNC: 33 MG/DL (ref 5–25)
CALCIUM SERPL-MCNC: 9.4 MG/DL (ref 8.3–10.1)
CHLORIDE SERPL-SCNC: 107 MMOL/L (ref 100–108)
CLARITY UR: ABNORMAL
CO2 SERPL-SCNC: 29 MMOL/L (ref 21–32)
COLOR UR: YELLOW
CREAT SERPL-MCNC: 1.81 MG/DL (ref 0.6–1.3)
CREAT UR-MCNC: 46.1 MG/DL
EOSINOPHIL # BLD AUTO: 0.26 THOUSAND/ΜL (ref 0–0.61)
EOSINOPHIL NFR BLD AUTO: 4 % (ref 0–6)
ERYTHROCYTE [DISTWIDTH] IN BLOOD BY AUTOMATED COUNT: 16.7 % (ref 11.6–15.1)
FERRITIN SERPL-MCNC: 314 NG/ML (ref 8–388)
GFR SERPL CREATININE-BSD FRML MDRD: 35 ML/MIN/1.73SQ M
GLUCOSE P FAST SERPL-MCNC: 90 MG/DL (ref 65–99)
GLUCOSE UR STRIP-MCNC: NEGATIVE MG/DL
HCT VFR BLD AUTO: 31.5 % (ref 36.5–49.3)
HGB BLD-MCNC: 9.8 G/DL (ref 12–17)
HGB UR QL STRIP.AUTO: ABNORMAL
IMM GRANULOCYTES # BLD AUTO: 0.04 THOUSAND/UL (ref 0–0.2)
IMM GRANULOCYTES NFR BLD AUTO: 1 % (ref 0–2)
IRON SATN MFR SERPL: 31 %
IRON SERPL-MCNC: 85 UG/DL (ref 65–175)
KETONES UR STRIP-MCNC: NEGATIVE MG/DL
LEUKOCYTE ESTERASE UR QL STRIP: ABNORMAL
LYMPHOCYTES # BLD AUTO: 1.74 THOUSANDS/ΜL (ref 0.6–4.47)
LYMPHOCYTES NFR BLD AUTO: 25 % (ref 14–44)
MCH RBC QN AUTO: 28 PG (ref 26.8–34.3)
MCHC RBC AUTO-ENTMCNC: 31.1 G/DL (ref 31.4–37.4)
MCV RBC AUTO: 90 FL (ref 82–98)
MONOCYTES # BLD AUTO: 0.54 THOUSAND/ΜL (ref 0.17–1.22)
MONOCYTES NFR BLD AUTO: 8 % (ref 4–12)
NEUTROPHILS # BLD AUTO: 4.37 THOUSANDS/ΜL (ref 1.85–7.62)
NEUTS SEG NFR BLD AUTO: 61 % (ref 43–75)
NITRITE UR QL STRIP: NEGATIVE
NON-SQ EPI CELLS URNS QL MICRO: ABNORMAL /HPF
NRBC BLD AUTO-RTO: 0 /100 WBCS
OTHER STN SPEC: ABNORMAL
PH UR STRIP.AUTO: 6 [PH]
PLATELET # BLD AUTO: 224 THOUSANDS/UL (ref 149–390)
PMV BLD AUTO: 12 FL (ref 8.9–12.7)
POTASSIUM SERPL-SCNC: 4.1 MMOL/L (ref 3.5–5.3)
PROT SERPL-MCNC: 7.4 G/DL (ref 6.4–8.2)
PROT UR STRIP-MCNC: ABNORMAL MG/DL
PROT UR-MCNC: 89 MG/DL
PROT/CREAT UR: 1.93 MG/G{CREAT} (ref 0–0.1)
RBC # BLD AUTO: 3.5 MILLION/UL (ref 3.88–5.62)
RBC #/AREA URNS AUTO: ABNORMAL /HPF
SODIUM SERPL-SCNC: 140 MMOL/L (ref 136–145)
SP GR UR STRIP.AUTO: 1.01 (ref 1–1.03)
TIBC SERPL-MCNC: 276 UG/DL (ref 250–450)
UROBILINOGEN UR QL STRIP.AUTO: 0.2 E.U./DL
VIT B12 SERPL-MCNC: 1132 PG/ML (ref 100–900)
WBC # BLD AUTO: 7.05 THOUSAND/UL (ref 4.31–10.16)
WBC #/AREA URNS AUTO: ABNORMAL /HPF

## 2019-06-25 PROCEDURE — 84156 ASSAY OF PROTEIN URINE: CPT

## 2019-06-25 PROCEDURE — 85025 COMPLETE CBC W/AUTO DIFF WBC: CPT

## 2019-06-25 PROCEDURE — 82570 ASSAY OF URINE CREATININE: CPT

## 2019-06-25 PROCEDURE — 83550 IRON BINDING TEST: CPT

## 2019-06-25 PROCEDURE — 81001 URINALYSIS AUTO W/SCOPE: CPT | Performed by: INTERNAL MEDICINE

## 2019-06-25 PROCEDURE — 82728 ASSAY OF FERRITIN: CPT

## 2019-06-25 PROCEDURE — 83540 ASSAY OF IRON: CPT

## 2019-06-25 PROCEDURE — 80053 COMPREHEN METABOLIC PANEL: CPT

## 2019-06-25 PROCEDURE — 86316 IMMUNOASSAY TUMOR OTHER: CPT

## 2019-06-25 PROCEDURE — 36415 COLL VENOUS BLD VENIPUNCTURE: CPT

## 2019-06-25 PROCEDURE — 82607 VITAMIN B-12: CPT

## 2019-06-26 ENCOUNTER — TRANSCRIBE ORDERS (OUTPATIENT)
Dept: LAB | Facility: CLINIC | Age: 79
End: 2019-06-26

## 2019-06-26 ENCOUNTER — APPOINTMENT (OUTPATIENT)
Dept: LAB | Facility: CLINIC | Age: 79
End: 2019-06-26
Payer: COMMERCIAL

## 2019-06-26 ENCOUNTER — CONSULT (OUTPATIENT)
Dept: SURGERY | Facility: HOSPITAL | Age: 79
End: 2019-06-26
Payer: COMMERCIAL

## 2019-06-26 VITALS — TEMPERATURE: 98.4 F | DIASTOLIC BLOOD PRESSURE: 68 MMHG | SYSTOLIC BLOOD PRESSURE: 109 MMHG | HEART RATE: 59 BPM

## 2019-06-26 DIAGNOSIS — A41.51 SEPSIS DUE TO ESCHERICHIA COLI (HCC): Chronic | ICD-10-CM

## 2019-06-26 DIAGNOSIS — K83.8 BILIARY SLUDGE: Primary | ICD-10-CM

## 2019-06-26 DIAGNOSIS — D64.9 ANEMIA, UNSPECIFIED TYPE: ICD-10-CM

## 2019-06-26 DIAGNOSIS — K83.8 BILIARY SLUDGE DETERMINED BY ULTRASOUND: ICD-10-CM

## 2019-06-26 LAB — HEMOCCULT STL QL IA: NEGATIVE

## 2019-06-26 PROCEDURE — G0328 FECAL BLOOD SCRN IMMUNOASSAY: HCPCS

## 2019-06-26 PROCEDURE — 99205 OFFICE O/P NEW HI 60 MIN: CPT | Performed by: SURGERY

## 2019-06-26 RX ORDER — COLCHICINE 0.6 MG/1
0.6 TABLET ORAL DAILY
COMMUNITY
End: 2020-01-16 | Stop reason: SDUPTHER

## 2019-06-28 ENCOUNTER — HOSPITAL ENCOUNTER (EMERGENCY)
Facility: HOSPITAL | Age: 79
Discharge: HOME | End: 2019-06-28
Attending: EMERGENCY MEDICINE | Admitting: EMERGENCY MEDICINE
Payer: COMMERCIAL

## 2019-06-28 VITALS
SYSTOLIC BLOOD PRESSURE: 135 MMHG | RESPIRATION RATE: 20 BRPM | BODY MASS INDEX: 24.16 KG/M2 | TEMPERATURE: 97.5 F | WEIGHT: 159.39 LBS | OXYGEN SATURATION: 96 % | HEIGHT: 68 IN | HEART RATE: 95 BPM | DIASTOLIC BLOOD PRESSURE: 80 MMHG

## 2019-06-28 DIAGNOSIS — T83.091A OBSTRUCTION OF FOLEY CATHETER (HCC): Primary | ICD-10-CM

## 2019-06-28 LAB
BACTERIA UR CULT: ABNORMAL
BACTERIA UR CULT: ABNORMAL
BACTERIA UR QL AUTO: ABNORMAL /HPF
BILIRUB UR QL STRIP: NEGATIVE
CGA SERPL-SCNC: 420 NMOL/L (ref 0–5)
CLARITY UR: ABNORMAL
COLOR UR: YELLOW
GLUCOSE UR STRIP-MCNC: NEGATIVE MG/DL
HGB UR QL STRIP.AUTO: ABNORMAL
KETONES UR STRIP-MCNC: NEGATIVE MG/DL
LEUKOCYTE ESTERASE UR QL STRIP: ABNORMAL
NITRITE UR QL STRIP: NEGATIVE
NON-SQ EPI CELLS URNS QL MICRO: ABNORMAL /HPF
OTHER STN SPEC: ABNORMAL
PH UR STRIP.AUTO: 6 [PH]
PROT UR STRIP-MCNC: ABNORMAL MG/DL
RBC #/AREA URNS AUTO: ABNORMAL /HPF
SP GR UR STRIP.AUTO: 1.01 (ref 1–1.03)
UROBILINOGEN UR QL STRIP.AUTO: 0.2 E.U./DL
WBC #/AREA URNS AUTO: ABNORMAL /HPF

## 2019-06-28 PROCEDURE — 87186 SC STD MICRODIL/AGAR DIL: CPT | Performed by: PHYSICIAN ASSISTANT

## 2019-06-28 PROCEDURE — 87086 URINE CULTURE/COLONY COUNT: CPT | Performed by: PHYSICIAN ASSISTANT

## 2019-06-28 PROCEDURE — 81001 URINALYSIS AUTO W/SCOPE: CPT | Performed by: PHYSICIAN ASSISTANT

## 2019-06-28 PROCEDURE — 99284 EMERGENCY DEPT VISIT MOD MDM: CPT

## 2019-06-28 PROCEDURE — 87077 CULTURE AEROBIC IDENTIFY: CPT | Performed by: PHYSICIAN ASSISTANT

## 2019-06-28 PROCEDURE — 99282 EMERGENCY DEPT VISIT SF MDM: CPT | Performed by: PHYSICIAN ASSISTANT

## 2019-06-29 NOTE — ED NOTES
Per Nahum Mercado, attempt to irrigate garcia  Garcia irrigated with 30cc with no return  Patient still reports pain and discomfort   Provider made aware and per Nahum Mercado to change garcia if no return     Melinda Pelaez RN  06/28/19 2023

## 2019-06-29 NOTE — ED PROVIDER NOTES
History  Chief Complaint   Patient presents with    Urinary Catheter Problem     patient brought in ems; patient had a garcia placed on Tuesday; patient states the garcia has been blocked since 18       [de-identified] yo male here for evaluation of an issue with his garcia catheter  States it has not been draining since about 1200 today  About 1-2 hours ago started developing suprapubic abdominal pain  Denies fevers, chills, n/v  No chest pain or sob  Recently admitted for UTI  Continues on abx for that  Has not missed any doses of his abx  History provided by:  Patient   used: No    Urinary Catheter Problem   Location:  Catheter  Quality:  Not draining  Severity:  Severe  Onset quality:  Sudden  Duration:  8 hours  Timing:  Constant  Progression:  Unchanged  Chronicity:  New  Relieved by:  Nothing  Worsened by:  Nothing  Ineffective treatments:  None tried  Associated symptoms: abdominal pain    Associated symptoms: no chest pain, no congestion, no cough, no diarrhea, no fatigue, no fever, no headaches, no myalgias, no nausea, no rash, no rhinorrhea, no shortness of breath, no sore throat, no vomiting and no wheezing        Prior to Admission Medications   Prescriptions Last Dose Informant Patient Reported? Taking?    amLODIPine (NORVASC) 2 5 mg tablet   No No   Sig: Take 4 tablets (10 mg total) by mouth daily for 30 days   aspirin 81 MG tablet  Self Yes No   Sig: Take 81 mg by mouth daily   colchicine (COLCRYS) 0 6 mg tablet   Yes No   Sig: Take 0 6 mg by mouth daily   pantoprazole (PROTONIX) 40 mg tablet  Self No No   Sig: Take 1 tablet (40 mg total) by mouth daily in the early morning   tamsulosin (FLOMAX) 0 4 mg  Self No No   Sig: Take 1 capsule (0 4 mg total) by mouth daily with dinner      Facility-Administered Medications: None       Past Medical History:   Diagnosis Date    BPH (benign prostatic hyperplasia)     Cancer ([facility] [address] )     liver cancer    Cardiac disease     Coronary artery disease  Diabetes mellitus ([facility] [address] )     DJD (degenerative joint disease)     Gait disturbance     Generalized weakness     GERD (gastroesophageal reflux disease)     Gout     Hyperlipidemia     Hypertension     Renal disorder        Past Surgical History:   Procedure Laterality Date    CORONARY ANGIOPLASTY WITH STENT PLACEMENT      IR IMAGE GUIDED BIOPSY/ASPIRATION LIVER  1/24/2019       Family History   Problem Relation Age of Onset    Cancer Mother     Hypertension Father     Cancer Brother     Cancer Maternal Grandmother     Cancer Paternal Aunt      I have reviewed and agree with the history as documented  Social History     Tobacco Use    Smoking status: Never Smoker    Smokeless tobacco: Never Used   Substance Use Topics    Alcohol use: Never     Frequency: Never    Drug use: Never        Review of Systems   Constitutional: Negative for activity change, appetite change, chills, diaphoresis, fatigue, fever and unexpected weight change  HENT: Negative for congestion, rhinorrhea, sinus pressure, sore throat and trouble swallowing  Eyes: Negative for photophobia and visual disturbance  Respiratory: Negative for apnea, cough, choking, chest tightness, shortness of breath, wheezing and stridor  Cardiovascular: Negative for chest pain, palpitations and leg swelling  Gastrointestinal: Positive for abdominal pain  Negative for abdominal distention, blood in stool, constipation, diarrhea, nausea and vomiting  Genitourinary: Negative for decreased urine volume, difficulty urinating, dysuria, enuresis, flank pain, frequency, hematuria and urgency  Musculoskeletal: Negative for arthralgias, myalgias, neck pain and neck stiffness  Skin: Negative for color change, pallor, rash and wound  Allergic/Immunologic: Negative  Neurological: Negative for dizziness, tremors, syncope, weakness, light-headedness, numbness and headaches  Hematological: Negative  Psychiatric/Behavioral: Negative  All other systems reviewed and are negative  Physical Exam  Physical Exam   Constitutional: He is oriented to person, place, and time  He appears well-developed and well-nourished  Non-toxic appearance  He does not have a sickly appearance  He does not appear ill  No distress  HENT:   Head: Normocephalic and atraumatic  Eyes: Pupils are equal, round, and reactive to light  EOM and lids are normal    Neck: Normal range of motion  Neck supple  Cardiovascular: Normal rate, regular rhythm, S1 normal, S2 normal, normal heart sounds, intact distal pulses and normal pulses  Exam reveals no gallop, no distant heart sounds, no friction rub and no decreased pulses  No murmur heard  Pulses:       Radial pulses are 2+ on the right side, and 2+ on the left side  Pulmonary/Chest: Effort normal and breath sounds normal  No accessory muscle usage  No apnea, no tachypnea and no bradypnea  No respiratory distress  He has no decreased breath sounds  He has no wheezes  He has no rhonchi  He has no rales  Abdominal: Soft  Normal appearance  He exhibits no distension  There is tenderness in the suprapubic area  There is no rigidity, no rebound and no guarding  Musculoskeletal: Normal range of motion  He exhibits no edema, tenderness or deformity  Neurological: He is alert and oriented to person, place, and time  No cranial nerve deficit  GCS eye subscore is 4  GCS verbal subscore is 5  GCS motor subscore is 6  Skin: Skin is warm, dry and intact  No rash noted  He is not diaphoretic  No erythema  No pallor  Psychiatric: His speech is normal    Nursing note and vitals reviewed        Vital Signs  ED Triage Vitals   Temperature Pulse Respirations Blood Pressure SpO2   06/28/19 2029 06/28/19 2025 06/28/19 2025 06/28/19 2025 06/28/19 2025   97 5 °F (36 4 °C) 95 20 135/80 96 %      Temp Source Heart Rate Source Patient Position - Orthostatic VS BP Location FiO2 (%)   06/28/19 2029 06/28/19 2025 06/28/19 2025 06/28/19 2025 --   Oral Monitor Lying Right arm       Pain Score       --                  Vitals:    06/28/19 2025   BP: 135/80   Pulse: 95   Patient Position - Orthostatic VS: Lying         Visual Acuity      ED Medications  Medications - No data to display    Diagnostic Studies  Results Reviewed     Procedure Component Value Units Date/Time    Urine culture [[id number]]  (Abnormal)  (Susceptibility) Collected:  06/28/19 2031    Lab Status:  Final result Specimen:  Urine, Indwelling Haile Catheter Updated:  06/30/19 1946     Urine Culture 70,000-79,000 cfu/ml Escherichia coli ESBL    Susceptibility     Escherichia coli ESBL (1)     Antibiotic Interpretation Microscan Method Status    ZID Performed  Yes  LUIS Final    Amikacin ($$) Susceptible <=16 ug/ml LUIS Final    Amoxicillin + Clavulanate Susceptible 8/4 ug/ml LUIS Final    Ampicillin ($$) Resistant >16 00 ug/ml LUIS Final    Ampicillin + Sulbactam ($) Resistant >16/8 ug/ml LUIS Final    Aztreonam ($$$)  Resistant >16 ug/ml LUIS Final    Cefazolin ($) Resistant >16 00 ug/ml LUIS Final    Cefepime ($) Resistant >16 00 ug/ml LUIS Final    Cefotaxime ($) Resistant >32 00 ug/ml LUIS Final    Ceftazidime ($$) Resistant >16 ug/ml LUIS Final    Ceftriaxone ($$) Resistant >32 00 ug/ml LUIS Final    Cefuroxime ($$) Resistant >16 ug/ml LUIS Final    Ciprofloxacin ($)  Resistant >2 00 ug/ml LUIS Final    Ertapenem ($$$) Susceptible <=0 5 ug/ml LUIS Final    Gentamicin ($$) Resistant >8 ug/ml LUIS Final    Levofloxacin ($) Resistant >4 00 ug/ml LUIS Final    Nitrofurantoin Resistant >64 ug/ml LUIS Final    Piperacillin + Tazobactam ($$$) Susceptible <=4 ug/ml LUIS Final    Tetracycline Susceptible <=4 ug/ml LUIS Final    Tobramycin ($) Intermediate 8 ug/ml LUIS Final    Trimethoprim + Sulfamethoxazole ($$$) Susceptible <=2/38 ug/ml LUIS Final                   Urine Microscopic [[id number]]  (Abnormal) Collected:  06/28/19 2031    Lab Status:  Final result Specimen:  Urine, Indwelling Haile Catheter Updated:  06/28/19 2046     RBC, UA 2-4 /hpf      WBC, UA 20-30 /hpf      Epithelial Cells Occasional /hpf      Bacteria, UA Occasional /hpf      OTHER OBSERVATIONS WBCs Clumped    UA w Reflex to Microscopic w Reflex to Culture [[id number]]  (Abnormal) Collected:  06/28/19 2031    Lab Status:  Final result Specimen:  Urine, Indwelling Garcia Catheter Updated:  06/28/19 2039     Color, UA Yellow     Clarity, UA Slightly Cloudy     Specific Gravity, UA 1 015     pH, UA 6 0     Leukocytes, UA Large     Nitrite, UA Negative     Protein, UA 30 (1+) mg/dl      Glucose, UA Negative mg/dl      Ketones, UA Negative mg/dl      Urobilinogen, UA 0 2 E U /dl      Bilirubin, UA Negative     Blood, UA Large                 No orders to display              Procedures  Procedures       ED Course                               MDM  Number of Diagnoses or Management Options  Obstruction of Garcia catheter [facility]): new and requires workup  Diagnosis management comments: ddx includes but is not limited to obstructed garcia, dislodged garcia, device malfunction, UTI  Plan: attempt to flush catheter of any clots and if no improvement we will replace  Amount and/or Complexity of Data Reviewed  Clinical lab tests: ordered    Risk of Complications, Morbidity, and/or Mortality  Presenting problems: moderate  Management options: low  General comments: [de-identified] yo with garcia catheter issue  Unable to flush  Garcia was replaced and afterwards his symptoms improved  No longer having abdominal pain  Feels better and wants to go home  We discussed f/u with his urologist and previously planned  Return parameters provided  Pt understands and agrees with plan        Patient Progress  Patient progress: stable      Disposition  Final diagnoses:   Obstruction of Garcia catheter [facility])     Time reflects when diagnosis was documented in both MDM as applicable and the Disposition within this note     Time User Action Codes Description Comment    6/28/2019 8:56 PM Malissa Villalobos Add [X70 [id number]] Obstruction of Haile catheter [facility])       ED Disposition     ED Disposition Condition Date/Time Comment    Discharge Stable Fri Jun 28, 2019  8:56 PM Pam Tate discharge to home/self care              Follow-up Information     Follow up With Specialties Details Why Contact Info Additional [address] [facility] Call  As needed [address] [id number] [phone] [address] [facility] [phone], [address] [place], [place], [address]          Discharge Medication List as of 6/28/2019  8:57 PM      CONTINUE these medications which have NOT CHANGED    Details   amLODIPine (NORVASC) 2 5 mg tablet Take 4 tablets (10 mg total) by mouth daily for 30 days, Starting Fri 6/7/2019, Until Sun 7/7/2019, Print      aspirin 81 MG tablet Take 81 mg by mouth daily, Historical Med      colchicine (COLCRYS) 0 6 mg tablet Take 0 6 mg by mouth daily, Historical Med      pantoprazole (PROTONIX) 40 mg tablet Take 1 tablet (40 mg total) by mouth daily in the early morning, Starting Fri 2/8/2019, No Print      tamsulosin (FLOMAX) 0 4 mg Take 1 capsule (0 4 mg total) by mouth daily with dinner, Starting Thu 2/7/2019, No Print      acetaminophen (TYLENOL) 325 mg tablet Take 650 mg by mouth every 6 (six) hours as needed for fever, Historical Med      B Complex-C-Folic Acid (TRIPHROCAPS) 1 MG CAPS Take 1 mg by mouth daily, Historical Med      bisacodyl (FLEET) 10 MG/30ML ENEM Insert 10 mg into the rectum once On day 5 if suppository is not effective, Historical Med      fosfomycin (MONUROL) 3 g Take 3 g by mouth every 3 (three) days, Starting Mon 6/17/2019, Normal      glucagon (GLUCAGON EMERGENCY) 1 MG injection Inject 1 mg under the skin once as needed for low blood sugar, Historical Med      insulin detemir (LEVEMIR) 100 units/mL subcutaneous injection Inject 10 Units under the skin daily at bedtime, Starting Fri 6/7/2019, No Print      insulin lispro (HumaLOG) 100 units/mL injection Inject 1-6 Units under the skin 3 (three) times a day before meals, Starting Thu 2/7/2019, No Print      ondansetron (ZOFRAN) 4 mg tablet Take 1 tablet (4 mg total) by mouth every 8 (eight) hours as needed for nausea or vomiting, Starting Sun 4/28/2019, Print      simethicone (MYLICON) 80 mg chewable tablet Chew 1 tablet (80 mg total) every 6 (six) hours as needed for flatulence, Starting Thu 2/7/2019, No Print           No discharge procedures on file      ED Provider  Electronically Signed by           Ginna Mansfield PA-C  07/08/19 0842

## 2019-06-30 ENCOUNTER — APPOINTMENT (EMERGENCY)
Dept: RADIOLOGY | Facility: HOSPITAL | Age: 79
DRG: 698 | End: 2019-06-30
Payer: COMMERCIAL

## 2019-06-30 ENCOUNTER — TELEPHONE (OUTPATIENT)
Dept: OTHER | Facility: HOSPITAL | Age: 79
End: 2019-06-30

## 2019-06-30 ENCOUNTER — HOSPITAL ENCOUNTER (INPATIENT)
Facility: HOSPITAL | Age: 79
LOS: 7 days | Discharge: HOME HEALTH | DRG: 698 | End: 2019-07-07
Attending: EMERGENCY MEDICINE | Admitting: FAMILY MEDICINE
Payer: COMMERCIAL

## 2019-06-30 DIAGNOSIS — Z97.8 CHRONIC INDWELLING FOLEY CATHETER: Chronic | ICD-10-CM

## 2019-06-30 DIAGNOSIS — D64.9 ANEMIA: ICD-10-CM

## 2019-06-30 DIAGNOSIS — A41.9 SEPSIS (HCC): ICD-10-CM

## 2019-06-30 DIAGNOSIS — Z16.12 URINARY TRACT INFECTION DUE TO EXTENDED-SPECTRUM BETA LACTAMASE (ESBL) PRODUCING ESCHERICHIA COLI: ICD-10-CM

## 2019-06-30 DIAGNOSIS — R53.1 WEAKNESS GENERALIZED: Primary | Chronic | ICD-10-CM

## 2019-06-30 DIAGNOSIS — N17.9 ACUTE KIDNEY INJURY SUPERIMPOSED ON CHRONIC KIDNEY DISEASE (HCC): Chronic | ICD-10-CM

## 2019-06-30 DIAGNOSIS — N39.0 URINARY TRACT INFECTION DUE TO EXTENDED-SPECTRUM BETA LACTAMASE (ESBL) PRODUCING ESCHERICHIA COLI: ICD-10-CM

## 2019-06-30 DIAGNOSIS — B96.29 URINARY TRACT INFECTION DUE TO EXTENDED-SPECTRUM BETA LACTAMASE (ESBL) PRODUCING ESCHERICHIA COLI: ICD-10-CM

## 2019-06-30 DIAGNOSIS — N18.9 ACUTE KIDNEY INJURY SUPERIMPOSED ON CHRONIC KIDNEY DISEASE (HCC): Chronic | ICD-10-CM

## 2019-06-30 DIAGNOSIS — C67.9 BLADDER CANCER (HCC): ICD-10-CM

## 2019-06-30 LAB
ANION GAP SERPL CALCULATED.3IONS-SCNC: 13 MMOL/L (ref 4–13)
ATRIAL RATE: 87 BPM
BACTERIA UR CULT: ABNORMAL
BACTERIA UR QL AUTO: ABNORMAL /HPF
BASOPHILS # BLD AUTO: 0.2 THOUSANDS/ΜL (ref 0–0.1)
BASOPHILS NFR BLD AUTO: 1 % (ref 0–2)
BILIRUB UR QL STRIP: NEGATIVE
BUN SERPL-MCNC: 38 MG/DL (ref 7–25)
CALCIUM SERPL-MCNC: 9.6 MG/DL (ref 8.6–10.5)
CHLORIDE SERPL-SCNC: 101 MMOL/L (ref 98–107)
CLARITY UR: ABNORMAL
CO2 SERPL-SCNC: 22 MMOL/L (ref 21–31)
COLOR UR: YELLOW
CREAT SERPL-MCNC: 2.36 MG/DL (ref 0.7–1.3)
EOSINOPHIL # BLD AUTO: 0 THOUSAND/ΜL (ref 0–0.61)
EOSINOPHIL NFR BLD AUTO: 0 % (ref 0–5)
ERYTHROCYTE [DISTWIDTH] IN BLOOD BY AUTOMATED COUNT: 18.7 % (ref 11.5–14.5)
GFR SERPL CREATININE-BSD FRML MDRD: 25 ML/MIN/1.73SQ M
GLUCOSE SERPL-MCNC: 229 MG/DL (ref 65–99)
GLUCOSE UR STRIP-MCNC: NEGATIVE MG/DL
HCT VFR BLD AUTO: 28.4 % (ref 42–47)
HGB BLD-MCNC: 9.4 G/DL (ref 14–18)
HGB UR QL STRIP.AUTO: ABNORMAL
KETONES UR STRIP-MCNC: NEGATIVE MG/DL
LACTATE SERPL-SCNC: 1.4 MMOL/L (ref 0.5–2)
LEUKOCYTE ESTERASE UR QL STRIP: ABNORMAL
LYMPHOCYTES # BLD AUTO: 0.6 THOUSANDS/ΜL (ref 0.6–4.47)
LYMPHOCYTES NFR BLD AUTO: 3 % (ref 21–51)
MCH RBC QN AUTO: 28.6 PG (ref 26–34)
MCHC RBC AUTO-ENTMCNC: 33.1 G/DL (ref 31–37)
MCV RBC AUTO: 86 FL (ref 81–99)
MONOCYTES # BLD AUTO: 1.7 THOUSAND/ΜL (ref 0.17–1.22)
MONOCYTES NFR BLD AUTO: 9 % (ref 2–12)
NEUTROPHILS # BLD AUTO: 16.5 THOUSANDS/ΜL (ref 1.4–6.5)
NEUTS SEG NFR BLD AUTO: 87 % (ref 42–75)
NITRITE UR QL STRIP: NEGATIVE
NON-SQ EPI CELLS URNS QL MICRO: ABNORMAL /HPF
P AXIS: 8 DEGREES
PH UR STRIP.AUTO: 5.5 [PH]
PLATELET # BLD AUTO: 157 THOUSANDS/UL (ref 149–390)
PMV BLD AUTO: 9.1 FL (ref 8.6–11.7)
POTASSIUM SERPL-SCNC: 3.5 MMOL/L (ref 3.5–5.5)
PR INTERVAL: 126 MS
PROT UR STRIP-MCNC: ABNORMAL MG/DL
QRS AXIS: -60 DEGREES
QRSD INTERVAL: 102 MS
QT INTERVAL: 366 MS
QTC INTERVAL: 440 MS
RBC # BLD AUTO: 3.29 MILLION/UL (ref 4.3–5.9)
RBC #/AREA URNS AUTO: ABNORMAL /HPF
SODIUM SERPL-SCNC: 136 MMOL/L (ref 134–143)
SP GR UR STRIP.AUTO: 1.02 (ref 1–1.03)
T WAVE AXIS: 84 DEGREES
UROBILINOGEN UR QL STRIP.AUTO: 0.2 E.U./DL
VENTRICULAR RATE: 87 BPM
WBC # BLD AUTO: 19.1 THOUSAND/UL (ref 4.8–10.8)
WBC #/AREA URNS AUTO: ABNORMAL /HPF

## 2019-06-30 PROCEDURE — 82948 REAGENT STRIP/BLOOD GLUCOSE: CPT

## 2019-06-30 PROCEDURE — 71045 X-RAY EXAM CHEST 1 VIEW: CPT

## 2019-06-30 PROCEDURE — 93005 ELECTROCARDIOGRAM TRACING: CPT

## 2019-06-30 PROCEDURE — 80048 BASIC METABOLIC PNL TOTAL CA: CPT | Performed by: EMERGENCY MEDICINE

## 2019-06-30 PROCEDURE — 99285 EMERGENCY DEPT VISIT HI MDM: CPT

## 2019-06-30 PROCEDURE — 85025 COMPLETE CBC W/AUTO DIFF WBC: CPT | Performed by: EMERGENCY MEDICINE

## 2019-06-30 PROCEDURE — 96365 THER/PROPH/DIAG IV INF INIT: CPT

## 2019-06-30 PROCEDURE — 93010 ELECTROCARDIOGRAM REPORT: CPT | Performed by: INTERNAL MEDICINE

## 2019-06-30 PROCEDURE — 36415 COLL VENOUS BLD VENIPUNCTURE: CPT | Performed by: EMERGENCY MEDICINE

## 2019-06-30 PROCEDURE — 81001 URINALYSIS AUTO W/SCOPE: CPT | Performed by: EMERGENCY MEDICINE

## 2019-06-30 PROCEDURE — 83605 ASSAY OF LACTIC ACID: CPT | Performed by: EMERGENCY MEDICINE

## 2019-06-30 RX ORDER — FERROUS SULFATE 325(65) MG
325 TABLET ORAL
COMMUNITY
End: 2019-09-03 | Stop reason: SINTOL

## 2019-06-30 RX ORDER — SODIUM CHLORIDE 9 MG/ML
125 INJECTION, SOLUTION INTRAVENOUS CONTINUOUS
Status: DISCONTINUED | OUTPATIENT
Start: 2019-06-30 | End: 2019-07-05

## 2019-06-30 RX ORDER — CEFTRIAXONE 1 G/50ML
1000 INJECTION, SOLUTION INTRAVENOUS ONCE
Status: COMPLETED | OUTPATIENT
Start: 2019-06-30 | End: 2019-06-30

## 2019-06-30 RX ORDER — ONDANSETRON 2 MG/ML
4 INJECTION INTRAMUSCULAR; INTRAVENOUS EVERY 6 HOURS PRN
Status: DISCONTINUED | OUTPATIENT
Start: 2019-06-30 | End: 2019-07-07 | Stop reason: HOSPADM

## 2019-06-30 RX ORDER — MELATONIN
1000 DAILY
COMMUNITY

## 2019-06-30 RX ORDER — ACETAMINOPHEN 325 MG/1
650 TABLET ORAL EVERY 4 HOURS PRN
Status: DISCONTINUED | OUTPATIENT
Start: 2019-06-30 | End: 2019-07-07 | Stop reason: HOSPADM

## 2019-06-30 RX ADMIN — SODIUM CHLORIDE 125 ML/HR: 9 INJECTION, SOLUTION INTRAVENOUS at 19:55

## 2019-06-30 RX ADMIN — CEFTRIAXONE 1000 MG: 1 INJECTION, SOLUTION INTRAVENOUS at 19:55

## 2019-06-30 RX ADMIN — SODIUM CHLORIDE 125 ML/HR: 9 INJECTION, SOLUTION INTRAVENOUS at 22:07

## 2019-06-30 NOTE — ED PROVIDER NOTES
History  Chief Complaint   Patient presents with    Weakness - Generalized     pt admitted to [facility] ED for UTI/garcia obstruction  Pt brought to ED for weakness and lethargy     Fever - 75 years or older     per family pt with high fever at home      [address] 2 DAYS  THIS FALLS AN EPISODE OF GARCIA CATHETER DYSFUNCTION  APPARENTLY THE GARCIA CATHETER WAS NOT FUNCTIONING AND WAS REPLACED  SINCE THE EVENT THE PATIENT HAS HAD GENERALIZED WEAKNESS TODAY WITH INABILITY TO WALK SECONDARY TO WEAKNESS  PATIENT REPORTEDLY HAD A FEVER ALTHOUGH HAS NOT QUANTIFIED AND ALSO CHILLS ARE REPORTED  THERE IS NO VOMITING OR DIARRHEA  PATIENT DOES REPORT A CHANGE IN THE COLOR OF THE URINE TO AN ORANGE COLOR  PATIENT REPORTS NO COUGH, SORE THROAT, OR RUNNY NOSE  THERE IS NO CHEST PAIN OR SHORTNESS OF BREATH  Prior to Admission Medications   Prescriptions Last Dose Informant Patient Reported? Taking?    B Complex-C-Folic Acid (TRIPHROCAPS) 1 MG CAPS   Yes Yes   Sig: Take 1 mg by mouth daily   Cholecalciferol 89011 units TABS   Yes Yes   Sig: Take 1 tablet by mouth once a week   acetaminophen (TYLENOL) 325 mg tablet  Self Yes Yes   Sig: Take 650 mg by mouth every 6 (six) hours as needed for fever   amLODIPine (NORVASC) 2 5 mg tablet   No Yes   Sig: Take 4 tablets (10 mg total) by mouth daily for 30 days   aspirin 81 MG tablet  Self Yes Yes   Sig: Take 81 mg by mouth daily   bisacodyl (FLEET) 10 MG/30ML ENEM  Self Yes No   Sig: Insert 10 mg into the rectum once On day 5 if suppository is not effective   cholecalciferol (VITAMIN D3) 1,000 units tablet   Yes Yes   Sig: Take 1,000 Units by mouth daily   colchicine (COLCRYS) 0 6 mg tablet   Yes Yes   Sig: Take 0 6 mg by mouth daily   fosfomycin (MONUROL) 3 g   No No   Sig: Take 3 g by mouth every 3 (three) days   glucagon (GLUCAGON EMERGENCY) 1 MG injection  Self Yes No   Sig: Inject 1 mg under the skin once as needed for low blood sugar   insulin detemir (LEVEMIR) 100 units/mL subcutaneous injection   No Yes   Sig: Inject 10 Units under the skin daily at bedtime   insulin lispro (HumaLOG) 100 units/mL injection  Self No Yes   Sig: Inject 1-6 Units under the skin 3 (three) times a day before meals   Patient taking differently: Inject 1-6 Units under the skin 3 (three) times a day before meals 151-200 2 units; 201-250 4 units; 251-300 6 units; 301-350 8 units; 351-400 10 units; 401-450 12 units; 451+ 12 units call MD   ondansetron (ZOFRAN) 4 mg tablet  Self No Yes   Sig: Take 1 tablet (4 mg total) by mouth every 8 (eight) hours as needed for nausea or vomiting   pantoprazole (PROTONIX) 40 mg tablet  Self No Yes   Sig: Take 1 tablet (40 mg total) by mouth daily in the early morning   simethicone (MYLICON) 80 mg chewable tablet  Self No Yes   Sig: Chew 1 tablet (80 mg total) every 6 (six) hours as needed for flatulence   tamsulosin (FLOMAX) 0 4 mg  Self No Yes   Sig: Take 1 capsule (0 4 mg total) by mouth daily with dinner      Facility-Administered Medications: None       Past Medical History:   Diagnosis Date    BPH (benign prostatic hyperplasia)     Cancer (H[facility])     liver cancer    Cardiac disease     Coronary artery disease     Diabetes mellitus ([facility])     DJD (degenerative joint disease)     Gait disturbance     Generalized weakness     GERD (gastroesophageal reflux disease)     Gout     Hyperlipidemia     Hypertension     Renal disorder        Past Surgical History:   Procedure Laterality Date    CORONARY ANGIOPLASTY WITH STENT PLACEMENT      IR IMAGE GUIDED BIOPSY/ASPIRATION LIVER  1/24/2019       Family History   Problem Relation Age of Onset    Cancer Mother     Hypertension Father     Cancer Brother     Cancer Maternal Grandmother     Cancer Paternal Aunt      I have reviewed and agree with the history as documented      Social History     Tobacco Use    Smoking status: Never Smoker    Smokeless tobacco: Never Used   Substance Use Topics    Alcohol use: Never     Frequency: Never    Drug use: Never        Review of Systems   Constitutional: Negative for chills and fever  HENT: Negative for ear pain, rhinorrhea and sore throat  Eyes: Negative for pain, redness and visual disturbance  Respiratory: Negative for cough and shortness of breath  Cardiovascular: Negative for chest pain and leg swelling  Gastrointestinal: Positive for abdominal pain  Negative for diarrhea, nausea and vomiting  Genitourinary: Positive for difficulty urinating  Negative for dysuria, flank pain, frequency and urgency  Musculoskeletal: Negative for back pain, myalgias and neck pain  Skin: Negative for rash  Neurological: Negative for dizziness, weakness, light-headedness and headaches  Hematological: Negative  Psychiatric/Behavioral: Negative for agitation, confusion and suicidal ideas  The patient is not nervous/anxious  All other systems reviewed and are negative  Physical Exam  Physical Exam   Constitutional: He is oriented to person, place, and time  He appears well-developed and well-nourished  HENT:   Nose: Nose normal    Mouth/Throat: Oropharynx is clear and moist  No oropharyngeal exudate  Eyes: Pupils are equal, round, and reactive to light  Conjunctivae and EOM are normal  No scleral icterus  Neck: Normal range of motion  Neck supple  No JVD present  No tracheal deviation present  Cardiovascular: Normal rate, regular rhythm and normal heart sounds  No murmur heard  Pulmonary/Chest: Effort normal and breath sounds normal  No respiratory distress  He has no wheezes  He has no rales  Abdominal: Soft  Bowel sounds are normal  He exhibits mass (FOR MOVEABLE LARGE MASS IN THE RIGHT LOWER QUADRANT  THERE IS NO BOGGINESS OR FLUCTUANCE )   There is tenderness (SOME TENDERNESS IN THE RIGHT UPPER AND RIGHT LOWER QUADRANTS   )  There is no guarding  Musculoskeletal: Normal range of motion  He exhibits no edema or tenderness  Neurological: He is alert and oriented to person, place, and time  No cranial nerve deficit or sensory deficit  He exhibits normal muscle tone  5/5 motor, nl sens   Skin: Skin is warm and dry  Psychiatric: He has a normal mood and affect  His behavior is normal    Nursing note and vitals reviewed  Vital Signs  ED Triage Vitals [06/30/19 1859]   Temperature Pulse Respirations Blood Pressure SpO2   99 8 °F (37 7 °C) 86 20 127/60 98 %      Temp Source Heart Rate Source Patient Position - Orthostatic VS BP Location FiO2 (%)   Temporal Monitor Sitting Left arm --      Pain Score       No Pain           Vitals:    06/30/19 1859   BP: 127/60   Pulse: 86   Patient Position - Orthostatic VS: Sitting         Visual Acuity      ED Medications  Medications - No data to display    Diagnostic Studies  Results Reviewed     None                 No orders to display              Procedures  Procedures       ED Course                               MDM  Number of Diagnoses or Management Options  Diagnosis management comments: THE PATIENT WAS INADVERTENTLY ADMITTED TO THE HOSPITALIST SERVICE  THE PATIENT WAS RE-PRESENTED TO DR   WHO ACCEPTED THE PATIENT FOR ADMISSION  Disposition  Final diagnoses:   None     ED Disposition     None      Follow-up Information    None         Patient's Medications   Discharge Prescriptions    No medications on file     No discharge procedures on file      ED Provider  Electronically Signed by           Belle Gonzalez MD  06/30/19 1922       Belle Gonzalez MD  06/30/19 4166

## 2019-07-01 ENCOUNTER — APPOINTMENT (INPATIENT)
Dept: ULTRASOUND IMAGING | Facility: HOSPITAL | Age: 79
DRG: 698 | End: 2019-07-01
Payer: COMMERCIAL

## 2019-07-01 PROBLEM — N39.0 URINARY TRACT INFECTION DUE TO EXTENDED-SPECTRUM BETA LACTAMASE (ESBL) PRODUCING ESCHERICHIA COLI: Status: ACTIVE | Noted: 2019-07-01

## 2019-07-01 PROBLEM — B96.29 URINARY TRACT INFECTION DUE TO EXTENDED-SPECTRUM BETA LACTAMASE (ESBL) PRODUCING ESCHERICHIA COLI: Status: ACTIVE | Noted: 2019-07-01

## 2019-07-01 PROBLEM — Z16.12 URINARY TRACT INFECTION DUE TO EXTENDED-SPECTRUM BETA LACTAMASE (ESBL) PRODUCING ESCHERICHIA COLI: Status: ACTIVE | Noted: 2019-07-01

## 2019-07-01 LAB
ALBUMIN SERPL BCP-MCNC: 3.2 G/DL (ref 3.5–5.7)
ALP SERPL-CCNC: 76 U/L (ref 55–165)
ALT SERPL W P-5'-P-CCNC: 6 U/L (ref 7–52)
ANION GAP SERPL CALCULATED.3IONS-SCNC: 14 MMOL/L (ref 4–13)
AST SERPL W P-5'-P-CCNC: 6 U/L (ref 13–39)
BASOPHILS # BLD AUTO: 0.1 THOUSANDS/ΜL (ref 0–0.1)
BASOPHILS NFR BLD AUTO: 0 % (ref 0–2)
BILIRUB SERPL-MCNC: 1.3 MG/DL (ref 0.2–1)
BUN SERPL-MCNC: 41 MG/DL (ref 7–25)
CALCIUM SERPL-MCNC: 8.7 MG/DL (ref 8.6–10.5)
CHLORIDE SERPL-SCNC: 106 MMOL/L (ref 98–107)
CO2 SERPL-SCNC: 18 MMOL/L (ref 21–31)
CREAT SERPL-MCNC: 2.43 MG/DL (ref 0.7–1.3)
EOSINOPHIL # BLD AUTO: 0 THOUSAND/ΜL (ref 0–0.61)
EOSINOPHIL NFR BLD AUTO: 0 % (ref 0–5)
ERYTHROCYTE [DISTWIDTH] IN BLOOD BY AUTOMATED COUNT: 17.7 % (ref 11.5–14.5)
GFR SERPL CREATININE-BSD FRML MDRD: 24 ML/MIN/1.73SQ M
GLUCOSE SERPL-MCNC: 191 MG/DL (ref 65–140)
GLUCOSE SERPL-MCNC: 238 MG/DL (ref 65–140)
GLUCOSE SERPL-MCNC: 242 MG/DL (ref 65–140)
GLUCOSE SERPL-MCNC: 277 MG/DL (ref 65–140)
GLUCOSE SERPL-MCNC: 279 MG/DL (ref 65–99)
GLUCOSE SERPL-MCNC: 313 MG/DL (ref 65–140)
HCT VFR BLD AUTO: 24.3 % (ref 42–47)
HEMOCCULT STL QL: NEGATIVE
HGB BLD-MCNC: 8.1 G/DL (ref 14–18)
IRON SERPL-MCNC: 14 UG/DL (ref 65–175)
LYMPHOCYTES # BLD AUTO: 0.8 THOUSANDS/ΜL (ref 0.6–4.47)
LYMPHOCYTES NFR BLD AUTO: 5 % (ref 21–51)
MCH RBC QN AUTO: 29.3 PG (ref 26–34)
MCHC RBC AUTO-ENTMCNC: 33.5 G/DL (ref 31–37)
MCV RBC AUTO: 88 FL (ref 81–99)
MONOCYTES # BLD AUTO: 1.6 THOUSAND/ΜL (ref 0.17–1.22)
MONOCYTES NFR BLD AUTO: 10 % (ref 2–12)
NEUTROPHILS # BLD AUTO: 13.3 THOUSANDS/ΜL (ref 1.4–6.5)
NEUTS SEG NFR BLD AUTO: 85 % (ref 42–75)
PLATELET # BLD AUTO: 144 THOUSANDS/UL (ref 149–390)
PMV BLD AUTO: 9.5 FL (ref 8.6–11.7)
POTASSIUM SERPL-SCNC: 3.2 MMOL/L (ref 3.5–5.5)
PROT SERPL-MCNC: 6.2 G/DL (ref 6.4–8.9)
RBC # BLD AUTO: 2.78 MILLION/UL (ref 4.3–5.9)
SODIUM SERPL-SCNC: 138 MMOL/L (ref 134–143)
WBC # BLD AUTO: 15.7 THOUSAND/UL (ref 4.8–10.8)

## 2019-07-01 PROCEDURE — 80053 COMPREHEN METABOLIC PANEL: CPT | Performed by: FAMILY MEDICINE

## 2019-07-01 PROCEDURE — 85025 COMPLETE CBC W/AUTO DIFF WBC: CPT | Performed by: FAMILY MEDICINE

## 2019-07-01 PROCEDURE — 83540 ASSAY OF IRON: CPT | Performed by: PHYSICIAN ASSISTANT

## 2019-07-01 PROCEDURE — 97166 OT EVAL MOD COMPLEX 45 MIN: CPT

## 2019-07-01 PROCEDURE — 76770 US EXAM ABDO BACK WALL COMP: CPT

## 2019-07-01 PROCEDURE — 82272 OCCULT BLD FECES 1-3 TESTS: CPT | Performed by: PHYSICIAN ASSISTANT

## 2019-07-01 PROCEDURE — G8987 SELF CARE CURRENT STATUS: HCPCS

## 2019-07-01 PROCEDURE — G8979 MOBILITY GOAL STATUS: HCPCS

## 2019-07-01 PROCEDURE — G8988 SELF CARE GOAL STATUS: HCPCS

## 2019-07-01 PROCEDURE — G8978 MOBILITY CURRENT STATUS: HCPCS

## 2019-07-01 PROCEDURE — 97163 PT EVAL HIGH COMPLEX 45 MIN: CPT

## 2019-07-01 PROCEDURE — 82948 REAGENT STRIP/BLOOD GLUCOSE: CPT

## 2019-07-01 RX ORDER — POTASSIUM CHLORIDE 20MEQ/15ML
20 LIQUID (ML) ORAL 2 TIMES DAILY
Status: DISCONTINUED | OUTPATIENT
Start: 2019-07-01 | End: 2019-07-02

## 2019-07-01 RX ORDER — FERROUS SULFATE 325(65) MG
325 TABLET ORAL
Status: DISCONTINUED | OUTPATIENT
Start: 2019-07-02 | End: 2019-07-02

## 2019-07-01 RX ORDER — SIMETHICONE 80 MG
80 TABLET,CHEWABLE ORAL EVERY 6 HOURS PRN
Status: DISCONTINUED | OUTPATIENT
Start: 2019-07-01 | End: 2019-07-07 | Stop reason: HOSPADM

## 2019-07-01 RX ORDER — PANTOPRAZOLE SODIUM 40 MG/1
40 TABLET, DELAYED RELEASE ORAL
Status: DISCONTINUED | OUTPATIENT
Start: 2019-07-01 | End: 2019-07-07 | Stop reason: HOSPADM

## 2019-07-01 RX ORDER — ONDANSETRON 4 MG/1
4 TABLET, ORALLY DISINTEGRATING ORAL EVERY 6 HOURS PRN
Status: DISCONTINUED | OUTPATIENT
Start: 2019-07-01 | End: 2019-07-01

## 2019-07-01 RX ORDER — TAMSULOSIN HYDROCHLORIDE 0.4 MG/1
0.4 CAPSULE ORAL
Status: DISCONTINUED | OUTPATIENT
Start: 2019-07-01 | End: 2019-07-07 | Stop reason: HOSPADM

## 2019-07-01 RX ORDER — ERGOCALCIFEROL 1.25 MG/1
50000 CAPSULE ORAL WEEKLY
Status: DISCONTINUED | OUTPATIENT
Start: 2019-07-01 | End: 2019-07-07 | Stop reason: HOSPADM

## 2019-07-01 RX ORDER — AMLODIPINE BESYLATE 5 MG/1
10 TABLET ORAL DAILY
Status: DISCONTINUED | OUTPATIENT
Start: 2019-07-01 | End: 2019-07-07 | Stop reason: HOSPADM

## 2019-07-01 RX ORDER — COLCHICINE 0.6 MG/1
0.3 TABLET ORAL DAILY
Status: DISCONTINUED | OUTPATIENT
Start: 2019-07-01 | End: 2019-07-07 | Stop reason: HOSPADM

## 2019-07-01 RX ORDER — CHOLECALCIFEROL (VITAMIN D3) 10 MCG
1 TABLET ORAL
Status: DISCONTINUED | OUTPATIENT
Start: 2019-07-01 | End: 2019-07-07 | Stop reason: HOSPADM

## 2019-07-01 RX ADMIN — SIMETHICONE CHEW TAB 80 MG 80 MG: 80 TABLET ORAL at 15:18

## 2019-07-01 RX ADMIN — INSULIN LISPRO 4 UNITS: 100 INJECTION, SOLUTION INTRAVENOUS; SUBCUTANEOUS at 07:58

## 2019-07-01 RX ADMIN — TAMSULOSIN HYDROCHLORIDE 0.4 MG: 0.4 CAPSULE ORAL at 17:35

## 2019-07-01 RX ADMIN — COLCHICINE 0.3 MG: 0.6 TABLET, FILM COATED ORAL at 11:35

## 2019-07-01 RX ADMIN — INSULIN LISPRO 3 UNITS: 100 INJECTION, SOLUTION INTRAVENOUS; SUBCUTANEOUS at 00:02

## 2019-07-01 RX ADMIN — ACETAMINOPHEN 650 MG: 325 TABLET ORAL at 17:34

## 2019-07-01 RX ADMIN — ERGOCALCIFEROL 50000 UNITS: 1.25 CAPSULE, LIQUID FILLED ORAL at 15:18

## 2019-07-01 RX ADMIN — INSULIN DETEMIR 10 UNITS: 100 INJECTION, SOLUTION SUBCUTANEOUS at 21:59

## 2019-07-01 RX ADMIN — POTASSIUM CHLORIDE 20 MEQ: 20 SOLUTION ORAL at 17:35

## 2019-07-01 RX ADMIN — INSULIN LISPRO 5 UNITS: 100 INJECTION, SOLUTION INTRAVENOUS; SUBCUTANEOUS at 11:33

## 2019-07-01 RX ADMIN — PANTOPRAZOLE SODIUM 40 MG: 40 TABLET, DELAYED RELEASE ORAL at 11:35

## 2019-07-01 RX ADMIN — INSULIN LISPRO 3 UNITS: 100 INJECTION, SOLUTION INTRAVENOUS; SUBCUTANEOUS at 17:35

## 2019-07-01 RX ADMIN — SODIUM CHLORIDE 125 ML/HR: 9 INJECTION, SOLUTION INTRAVENOUS at 10:32

## 2019-07-01 RX ADMIN — Medication 1 CAPSULE: at 17:35

## 2019-07-01 RX ADMIN — SODIUM CHLORIDE 500 MG: 9 INJECTION, SOLUTION INTRAVENOUS at 11:33

## 2019-07-01 RX ADMIN — AMLODIPINE BESYLATE 10 MG: 5 TABLET ORAL at 11:34

## 2019-07-01 RX ADMIN — POTASSIUM CHLORIDE 20 MEQ: 20 SOLUTION ORAL at 11:33

## 2019-07-01 RX ADMIN — ACETAMINOPHEN 650 MG: 325 TABLET ORAL at 03:28

## 2019-07-01 RX ADMIN — INSULIN DETEMIR 10 UNITS: 100 INJECTION, SOLUTION SUBCUTANEOUS at 00:01

## 2019-07-01 RX ADMIN — SODIUM CHLORIDE 125 ML/HR: 9 INJECTION, SOLUTION INTRAVENOUS at 21:56

## 2019-07-01 RX ADMIN — INSULIN LISPRO 2 UNITS: 100 INJECTION, SOLUTION INTRAVENOUS; SUBCUTANEOUS at 21:59

## 2019-07-01 NOTE — OCCUPATIONAL THERAPY NOTE
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)    Acute kidney injury superimposed on chronic kidney disease (HCC)    Acute pain of left knee    Pressure injury of right heel, unstageable ([facility] [address] )    Atherosclerosis of artery of extremity with ulceration (HCC)    Carcinoid syndrome (HCC)    Sepsis (HCC)    Gram-negative bacteremia    Left hip pain    Acute cystitis with hematuria    Chronic indwelling Haile catheter    Moderate protein-calorie malnutrition (HCC)    Biliary sludge    Urinary tract infection due to extended-spectrum beta lactamase (ESBL) producing Escherichia coli       Past Medical History:   Diagnosis Date    BPH (benign prostatic hyperplasia)     Cancer ([facility] [address] )     liver cancer    Cardiac disease     Coronary artery disease     Diabetes mellitus ([facility] [address] )     DJD (degenerative joint disease)     Gait disturbance     Generalized weakness     GERD (gastroesophageal reflux disease)     Gout     Hyperlipidemia     Hypertension     Renal disorder        Past Surgical History:   Procedure Laterality Date    CORONARY ANGIOPLASTY WITH STENT PLACEMENT      IR IMAGE GUIDED BIOPSY/ASPIRATION LIVER  1/24/2019 07/01/19 7340   Note Type   Note type Eval only   Restrictions/Precautions   Weight Bearing Precautions Per Order No   Other Precautions Contact/isolation; Fall Risk   Pain Assessment   Pain Assessment No/denies pain   Pain Score No Pain   Home Living   Type of [address] [place] [address] level;Performs ADLs on one level; Able to live on main level with bedroom/bathroom;Stairs to enter with rails   Bathroom Shower/Tub Tub/shower unit   Bathroom Toilet Standard   Bathroom Equipment Shower chair   [facility]   Prior Function   Level of [place] Independent with ADLs and functional mobility; Needs assistance with IADLs   Lives With Significant other   Receives Help From Family   ADL Assistance Independent   IADLs Needs assistance   Falls in the last 6 months 1 to 4   Vocational Retired   Comments Pt was rec [facility]  Pt was recently d/c from [address] [place] [address] 27 5  Supervision/Setup   Grooming Assistance 5  Supervision/Setup   UB Bathing Assistance 3  Moderate Assistance   LB Bathing Assistance 3  Moderate Assistance   [address] 3  Moderate Assistance   LB Dressing Assistance 2  Maximal [address]  3  Moderate Assistance   Functional Assistance 3  Moderate Assistance   Bed Mobility   Rolling L 4  Minimal assistance   Supine to Sit 3  Moderate assistance   Sit to Supine 3  Moderate assistance   Transfers   Sit to Stand 3  Moderate assistance   Stand to Sit 3  Moderate assistance   Balance   Static Sitting Fair   Activity Tolerance   Activity Tolerance Patient limited by fatigue   Nurse Made Aware yes   RUE Assessment   RUE Assessment WFL   LUE Assessment   LUE Assessment WFL   Cognition   Overall Cognitive Status WFL   Orientation Level Oriented X4   Memory Within functional limits   Following Commands Follows one step commands with increased time or repetition   Assessment   Limitation Decreased ADL status; Decreased UE strength;Decreased Safe judgement during ADL;Decreased cognition;Decreased endurance   Prognosis Fair   Assessment Pt is a [de-identified] y o  male seen for OT evaluation s/p admit to [facility] on 6/30/2019 w/ Urinary tract infection due to extended-spectrum beta lactamase (ESBL) producing Escherichia coli  Comorbidities affecting pt's functional performance at time of assessment include: weakness, UTI, SILVERIO, chronic kidney disease, chronic indwelling catheter, DM, anemia, HTN  Personal factors affecting pt at time of IE include: Pt on [address] at home  Prior to admission, pt was independent with ADL's   Upon evaluation: the following deficits impact occupational performance: weakness, decreased strength, decreased balance, decreased tolerance, impaired memory, impaired problem solving and decreased safety awareness  Pt to benefit from continued skilled OT tx while in the hospital to address deficits as defined above and maximize level of functional independence w ADL's and functional mobility  Occupational Performance areas to address include: eating, grooming, bathing/shower, toilet hygiene, dressing, health maintenance, functional mobility and clothing management  From OT standpoint, recommendation at time of d/c would be STR        Goals   Patient Goals To go home and continue Home health services   STG Time Frame 3-5   Short Term Goal #1 Pt will maintain BUE strength for safe transfers   Short Term Goal #2 Pt will improve activity tolerance for participation in self-care   Short Term Goal  Pt will perfrom bed mobiliy with Min A   LTG Time Frame 7-10   Long Term Goal #1 Pt will perform grooming to set-up   Long Term Goal #2 Pt will perform UB bathing and dressing with Tami   Plan   Treatment Interventions ADL retraining;Functional transfer training;UE strengthening/ROM; Endurance training;Cognitive reorientation;Patient/family training;Equipment evaluation/education   Goal Expiration Date 07/11/19   Treatment Day 0   OT Frequency 3-5x/wk   Barthel Index   Feeding 5   Bathing 0   Grooming Score 0   Dressing Score 5   Bladder Score 0   Bowels Score 5   Toilet Use Score 0   Transfers (Bed/Chair) Score 5   Mobility (Level Surface) Score 0   Stairs Score 0   Barthel Index Score 20   Gary Danger, OT

## 2019-07-01 NOTE — UTILIZATION REVIEW
Notification of Inpatient Admission/Inpatient Authorization Request  This is a Notification of Inpatient Admission/Request for Inpatient Authorization for our facility [address] [facility]  Be advised that this patient was admitted to our facility under Inpatient Status  Please contact the Utilization Review Department where the patient is receiving care services for additional admission information  Place of Service Code: 24   [facility]  Presentation Date & Time: 6/30/2019  6:55 PM  Inpatient Admission Date & Time: 6/30/19 2044  Discharge Date & Time: No discharge date for patient encounter  Discharge Disposition (if discharged): Home/Self Care  Attending Physician: Dimitrios Nur [[id number]]   Attending Physician:  MINISTERIO Nur  [facility] ID- [id number]  [address] [address], [address]  Phone: [phone]  Fax: [phone]  Admission Orders (From admission, onward)    Ordered        06/30/19 2044  Inpatient Admission (expected length of stay for this patient Order details is greater than two midnights)  Once             Facility: [address] Utilization Review Department  Phone: [phone]; Fax [phone]  [email or web address]  ATTENTION: Please call with any questions or concerns to [phone]  and carefully listen to the prompts so that you are directed to the right person  Send all requests for admission clinical reviews, approved or denied determinations and any other requests to fax [phone]   All voicemails are confidential

## 2019-07-01 NOTE — PLAN OF CARE
Problem: Potential for Falls  Goal: Patient will remain free of falls  Description  INTERVENTIONS:  - Assess patient frequently for physical needs  -  Identify cognitive and physical deficits and behaviors that affect risk of falls    -  Elizabeth fall precautions as indicated by assessment   - Educate patient/family on patient safety including physical limitations  - Instruct patient to call for assistance with activity based on assessment  - Modify environment to reduce risk of injury  - Consider OT/PT consult to assist with strengthening/mobility  Outcome: Progressing     Problem: Prexisting or High Potential for Compromised Skin Integrity  Goal: Skin integrity is maintained or improved  Description  INTERVENTIONS:  - Identify patients at risk for skin breakdown  - Assess and monitor skin integrity  - Assess and monitor nutrition and hydration status  - Monitor labs (i e  albumin)  - Assess for incontinence   - Turn and reposition patient  - Assist with mobility/ambulation  - Relieve pressure over bony prominences  - Avoid friction and shearing  - Provide appropriate hygiene as needed including keeping skin clean and dry  - Evaluate need for skin moisturizer/barrier cream  - Collaborate with interdisciplinary team (i e  Nutrition, Rehabilitation, etc )   - Patient/family teaching  Outcome: Progressing     Problem: PAIN - ADULT  Goal: Verbalizes/displays adequate comfort level or baseline comfort level  Description  Interventions:  - Encourage patient to monitor pain and request assistance  - Assess pain using appropriate pain scale  - Administer analgesics based on type and severity of pain and evaluate response  - Implement non-pharmacological measures as appropriate and evaluate response  - Consider cultural and social influences on pain and pain management  - Notify physician/advanced practitioner if interventions unsuccessful or patient reports new pain  Outcome: Progressing     Problem: INFECTION - ADULT  Goal: Absence or prevention of progression during hospitalization  Description  INTERVENTIONS:  - Assess and monitor for signs and symptoms of infection  - Monitor lab/diagnostic results  - Monitor all insertion sites, i e  indwelling lines, tubes, and drains  - Monitor endotracheal (as able) and nasal secretions for changes in amount and color  - [place] appropriate cooling/warming therapies per order  - Administer medications as ordered  - Instruct and encourage patient and family to use good hand hygiene technique  - Identify and instruct in appropriate isolation precautions for identified infection/condition  Outcome: Progressing     Problem: SAFETY ADULT  Goal: Maintain or return to baseline ADL function  Description  INTERVENTIONS:  -  Assess patient's ability to carry out ADLs; assess patient's baseline for ADL function and identify physical deficits which impact ability to perform ADLs (bathing, care of mouth/teeth, toileting, grooming, dressing, etc )  - Assess/evaluate cause of self-care deficits   - Assess range of motion  - Assess patient's mobility; develop plan if impaired  - Assess patient's need for assistive devices and provide as appropriate  - Encourage maximum independence but intervene and supervise when necessary  ¯ Involve family in performance of ADLs  ¯ Assess for home care needs following discharge   ¯ Request OT consult to assist with ADL evaluation and planning for discharge  ¯ Provide patient education as appropriate  Outcome: Progressing  Goal: Maintain or return mobility status to optimal level  Description  INTERVENTIONS:  - Assess patient's baseline mobility status (ambulation, transfers, stairs, etc )    - Identify cognitive and physical deficits and behaviors that affect mobility  - Identify mobility aids required to assist with transfers and/or ambulation (gait belt, sit-to-stand, lift, walker, cane, etc )  - [place] fall precautions as indicated by assessment  - Record patient progress and toleration of activity level on Mobility SBAR; progress patient to next Phase/Stage  - Instruct patient to call for assistance with activity based on assessment  - Request Rehabilitation consult to assist with strengthening/weightbearing, etc   Outcome: Progressing     Problem: DISCHARGE PLANNING  Goal: Discharge to home or other facility with appropriate resources  Description  INTERVENTIONS:  - Identify barriers to discharge w/patient and caregiver  - Arrange for needed discharge resources and transportation as appropriate  - Identify discharge learning needs (meds, wound care, etc )  - Arrange for interpretive services to assist at discharge as needed  - Refer to Case Management Department for coordinating discharge planning if the patient needs post-hospital services based on physician/advanced practitioner order or complex needs related to functional status, cognitive ability, or social support system  Outcome: Progressing     Problem: Knowledge Deficit  Goal: Patient/family/caregiver demonstrates understanding of disease process, treatment plan, medications, and discharge instructions  Description  Complete learning assessment and assess knowledge base    Interventions:  - Provide teaching at level of understanding  - Provide teaching via preferred learning methods  Outcome: Progressing

## 2019-07-01 NOTE — UTILIZATION REVIEW
Initial Clinical Review    Admission: Date/Time/Statement: 6/30/19 @ 2044 INPATIENT    Orders Placed This Encounter   Procedures    Inpatient Admission (expected length of stay for this patient Order details is greater than two midnights)     Standing Status:   Standing     Number of Occurrences:   1     Order Specific Question:   Admitting Physician     Answer:   Traci Thompson [[id number]]     Order Specific Question:   Level of Care     Answer:   Med Surg [16]     Order Specific Question:   Estimated length of stay     Answer:   More than 2 Midnights     Order Specific Question:   Certification     Answer:   I certify that inpatient services are medically necessary for this patient for a duration of greater than two midnights  See H&P and MD Progress Notes for additional information about the patient's course of treatment  ED Arrival Information     Expected Arrival Acuity Means of Arrival Escorted By Service Admission Type    - 6/30/2019 18:52 Urgent Ambulance [facility] Reg Hospitalist Urgent    Arrival Complaint    -        Chief Complaint   Patient presents with    Weakness - Generalized     pt admitted to [place] ED for UTI/garcia obstruction  Pt brought to ED for weakness and lethargy     Fever - 75 years or older     per family pt with high fever at home      Assessment/Plan: [de-identified] y/o male with hx liver and bladder ca with chronic indwelling garcia catheter presents to ED from home by EMS with generalized weakness for 2 days  Was seen in ED 6/28 for garcia obstruction, and garcia was replaced  Worsening weakness since then, today with inability to walk and subjective fever  Also reports change in urine color, now orange  Also had recent hospitalization for ESBL UTI  No exam, there is moveable large mass in RLQ, tenderness in RUQ and RLQ  Admitted as inpatient due to ESBL UTI with possible urosepsis, SILVERIO, iron deficiency anemia, generalized weakness, hypokalemia  Continue IV antibiotics    Consult urology  Continue IVF  Consult nephrology  Recheck iron levels and heme stool to rule out acute bleed  Hold ASA  Replete K+  PT/OT  7/1 Pt with continued generalized weakness      ED Triage Vitals [06/30/19 1859]   Temperature Pulse Respirations Blood Pressure SpO2   99 8 °F (37 7 °C) 86 20 127/60 98 %      Temp Source Heart Rate Source Patient Position - Orthostatic VS BP Location FiO2 (%)   Temporal Monitor Sitting Left arm --      Pain Score       No Pain        Wt Readings from Last 1 Encounters:   06/30/19 69 9 kg (154 lb 1 oz)     Additional Vital Signs:   07/01/19 0625 97 °F (36 1 °C)Abnormal  73 16 116/56 98 % None (Room air)   07/01/19 0300 101 °F (38 3 °C)Abnormal  -- -- -- -- --   06/30/19 2132 100 5 °F (38 1 °C)  94 16 142/64 93 % None (Room air)       Pertinent Labs/Diagnostic Test Results:   Lab Units 07/01/19 0459 06/30/19 1942   WBC Thousand/uL 15 70* 19 10*   HEMOGLOBIN g/dL 8 1* 9 4*   HEMATOCRIT % 24 3* 28 4*   PLATELETS Thousands/uL 144* 157   NEUTROS ABS Thousands/µL 13 30* 16 50*     Lab Units 07/01/19 0459 06/30/19 1942   SODIUM mmol/L 138 136   POTASSIUM mmol/L 3 2* 3 5   CHLORIDE mmol/L 106 101   CO2 mmol/L 18* 22   ANION GAP mmol/L 14* 13   BUN mg/dL 41* 38*   CREATININE mg/dL 2 43* 2 36*   EGFR ml/min/1 73sq m 24 25   CALCIUM mg/dL 8 7 9 6     Lab Units 07/01/19  0459   AST U/L 6*   ALT U/L 6*   ALK PHOS U/L 76   TOTAL PROTEIN g/dL 6 2*   ALBUMIN g/dL 3 2*   TOTAL BILIRUBIN mg/dL 1 30*     Lab Units 07/01/19 0625 06/30/19 2139   POC GLUCOSE mg/dl 277* 242*     Results from last 7 days   Lab Units 07/01/19 0459 06/30/19 1942   GLUCOSE RANDOM mg/dL 279* 229*     Results from last 7 days   Lab Units 06/30/19  2046   LACTIC ACID mmol/L 1 4     Lab Units 06/30/19  1942   CLARITY UA  Cloudy*   COLOR UA  Yellow   SPEC GRAV UA  1 025   PH UA  5 5   GLUCOSE UA mg/dl Negative   KETONES UA mg/dl Negative   BLOOD UA  2+*   PROTEIN UA mg/dl 2+*   NITRITE UA  Negative   BILIRUBIN UA Negative   UROBILINOGEN UA E U /dl 0 2   LEUKOCYTES UA  2+*   WBC UA /hpf 30-50*   RBC UA /hpf 4-10*   BACTERIA UA /hpf Occasional   EPITHELIAL CELLS WET PREP /hpf Occasional     Results from last 7 days   Lab Units 06/28/19 2031   URINE CULTURE  70,000-79,000 cfu/ml Escherichia coli ESBL*     6/30 EKG:  Sinus rhythm with sinus arrhythmia with occasional Premature ventricular complexes  Left axis deviation  Minimal voltage criteria for LVH, may be normal variant  6/30 CXR:  No acute cardiopulmonary disease      ED Treatment:   Medication Administration from 06/30/2019 1852 to 06/30/2019 2129       Date/Time Order Dose Route Action     06/30/2019 1955 sodium chloride 0 9 % infusion 125 mL/hr Intravenous New Bag     06/30/2019 1955 cefTRIAXone (ROCEPHIN) IVPB (premix) 1,000 mg 1,000 mg Intravenous New Bag        Past Medical History:   Diagnosis Date    BPH (benign prostatic hyperplasia)     Cancer (HCC)     liver cancer    Cardiac disease     Coronary artery disease     Diabetes mellitus ([address] )     DJD (degenerative joint disease)     Gait disturbance     Generalized weakness     GERD (gastroesophageal reflux disease)     Gout     Hyperlipidemia     Hypertension     Renal disorder        Admitting Diagnosis: Weakness [R53 1]  Age/Sex: [de-identified] y o  male  Admission Orders:    Current Facility-Administered Medications:  acetaminophen 650 mg Oral Q4H PRN x1   amLODIPine 10 mg Oral Daily   b complex-vitamin C-folic acid 1 capsule Oral Daily With Dinner   colchicine 0 3 mg Oral Daily   ergocalciferol 50,000 Units Oral Weekly   ertapenem 500 mg Intravenous Q24H   [START ON 7/2/2019] ferrous sulfate 325 mg Oral Daily With Breakfast   glucose 16 g Oral Once PRN   insulin detemir 10 Units Subcutaneous HS   insulin lispro 1-6 Units Subcutaneous 4x Daily (PC & HS)   ondansetron 4 mg Intravenous Q6H PRN   pantoprazole 40 mg Oral Early Morning   potassium chloride 20 mEq Oral BID   simethicone 80 mg Oral Q6H PRN   sodium chloride 125 mL/hr Intravenous Continuous   tamsulosin 0 4 mg Oral Daily With Dinner       Stool occult blood  Nephrology consult  Urology consult  PT/OT    Network Utilization Review Department  Phone: [phone]; Fax [phone]  [email or web address]  ATTENTION: Please call with any questions or concerns to [phone]  and carefully listen to the prompts so that you are directed to the right person  Send all requests for admission clinical reviews, approved or denied determinations and any other requests to fax [phone]   All voicemails are confidential

## 2019-07-01 NOTE — H&P
Beltran Scanlon#  CFV:[id number] [address]  DJY:[id number]    HPB:[id number]  Adm Date: 6/30/2019 1855  6:55 PM   ATT PHY: John Kee, [address]       Chief Complaint: Generalized Weakness and Fever    History of Presenting Illness: Alin Felix is a(n) [de-identified]y o  year old male presenting to [facility] ED yesterday for generalized weakness and fever  The patient has a past medical history significant for liver cancer, bladder cancer, Haile catheter dysfunction, and recent hospitalization for ESBL positive UTI  Labs yesterday were significant for WBC count of 19 10, normal Lactic Acid levels, renal function showing creatinine of 2 36, BUN of 38 and GFR of 25 (baseline creatinine seems to be 1 6 to 1 8), UA showing 30-50 WBC, 4-10 RBC, 2+ Leukocyte, negative nitrites, and cloudy urine  Of note, the patient had a UA and culture on 6/28 which was again significant for MDR 70k-79k CFU E  Coli ESBL positive  Although this culture shows resistance to Cephalosporins, he was empirically started on Rocephin in the ER yesterday  The patient was seen after reviewing the chart and discussing the case with caring staff  Today during our encounter, the patient reported continued generalized weakness  He otherwise denies any particular concerns  The patient's girlfriend/caretaker was also present to provide history, and stated that this above-mentioned Haile catheter dysfunction occurred last Tuesday  She states that the nurse put the catheter in, and by Friday the patient was complaining of severe urethral/bladder pains  Today's labs show low potassium at 3 2, slightly worsened kidney function (creatinine 2 43, BUN 41, GFR 24), improved WBC at 15 70, and decreased H/H from 9 4/28 4 to 8 1/24  3  No Known Allergies    No current facility-administered medications on file prior to encounter        Current Outpatient Medications on File Prior to Encounter Medication Sig Dispense Refill    acetaminophen (TYLENOL) 325 mg tablet Take 650 mg by mouth every 6 (six) hours as needed for fever      amLODIPine (NORVASC) 2 5 mg tablet Take 4 tablets (10 mg total) by mouth daily for 30 days 90 tablet 0    aspirin 81 MG tablet Take 81 mg by mouth daily      B Complex-C-Folic Acid (TRIPHROCAPS) 1 MG CAPS Take 1 mg by mouth daily      bisacodyl (FLEET) 10 MG/30ML ENEM Insert 10 mg into the rectum once On day 5 if suppository is not effective      cholecalciferol (VITAMIN D3) 1,000 units tablet Take 1,000 Units by mouth daily      Cholecalciferol 15064 units TABS Take 1 tablet by mouth once a week      colchicine (COLCRYS) 0 6 mg tablet Take 0 6 mg by mouth daily      insulin detemir (LEVEMIR) 100 units/mL subcutaneous injection Inject 10 Units under the skin daily at bedtime      insulin lispro (HumaLOG) 100 units/mL injection Inject 1-6 Units under the skin 3 (three) times a day before meals (Patient taking differently: Inject 1-6 Units under the skin 3 (three) times a day before meals 151-200 2 units; 201-250 4 units; 251-300 6 units; 301-350 8 units; 351-400 10 units; 401-450 12 units; 451+ 12 units call MD)  0    ondansetron (ZOFRAN) 4 mg tablet Take 1 tablet (4 mg total) by mouth every 8 (eight) hours as needed for nausea or vomiting 12 tablet 0    pantoprazole (PROTONIX) 40 mg tablet Take 1 tablet (40 mg total) by mouth daily in the early morning  0    simethicone (MYLICON) 80 mg chewable tablet Chew 1 tablet (80 mg total) every 6 (six) hours as needed for flatulence 30 tablet 0    tamsulosin (FLOMAX) 0 4 mg Take 1 capsule (0 4 mg total) by mouth daily with dinner  0    ferrous sulfate 325 (65 Fe) mg tablet Take 325 mg by mouth daily with breakfast      fosfomycin (MONUROL) 3 g Take 3 g by mouth every 3 (three) days 3 g 2    glucagon (GLUCAGON EMERGENCY) 1 MG injection Inject 1 mg under the skin once as needed for low blood sugar         Active Ambulatory Problems     Diagnosis Date Noted    Weakness generalized 01/23/2019    Type 2 diabetes mellitus with complication, with long-term current use of insulin ([facility] ) 01/23/2019    Essential hypertension 01/23/2019    Mixed hyperlipidemia 01/23/2019    Cardiac disease 01/23/2019    Generalized abdominal mass 01/23/2019    Hydroureter 01/23/2019    Anemia 01/24/2019    Syncope and collapse 01/27/2019    Accelerated hypertension 01/27/2019    Liver mass 01/27/2019    Neuroendocrine tumor 02/03/2019    Acute cystitis without hematuria 02/03/2019    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 ([facility] ) 02/26/2019    Acute kidney injury superimposed on chronic kidney disease ([facility] ) 04/01/2019    Acute pain of left knee 04/02/2019    Pressure injury of right heel, unstageable ([address] ) 04/03/2019    Atherosclerosis of artery of extremity with ulceration ([facility] ) 04/16/2019    Carcinoid syndrome ([facility] ) 04/22/2019    Sepsis ([facility] ) 04/29/2019    Gram-negative bacteremia 04/29/2019    Left hip pain 05/03/2019    Acute cystitis with hematuria 06/03/2019    Chronic indwelling Haile catheter 06/03/2019    Moderate protein-calorie malnutrition ([address] ) 06/07/2019    Biliary sludge 06/18/2019     Resolved Ambulatory Problems     Diagnosis Date Noted    Diarrhea 01/23/2019    Acute renal failure superimposed on stage 3 chronic kidney disease ([address] ) 03/94/2481    Metabolic acidosis 25/09/3969     Past Medical History:   Diagnosis Date    BPH (benign prostatic hyperplasia)     Cancer ([address] )     Coronary artery disease     Diabetes mellitus ([facility] )     DJD (degenerative joint disease)     Gait disturbance     Generalized weakness     GERD (gastroesophageal reflux disease)     Gout     Hyperlipidemia     Hypertension     Renal disorder        Past Surgical History:   Procedure Laterality Date    CORONARY ANGIOPLASTY WITH STENT PLACEMENT      IR IMAGE GUIDED BIOPSY/ASPIRATION LIVER  1/24/2019       Social History Socioeconomic History    Marital status:      Spouse name: None    Number of children: None    Years of education: None    Highest education level: None   Occupational History    None   Social Needs    Financial resource strain: None    Food insecurity:     Worry: None     Inability: None    Transportation needs:     Medical: None     Non-medical: None   Tobacco Use    Smoking status: Never Smoker    Smokeless tobacco: Never Used   Substance and Sexual Activity    Alcohol use: Never     Frequency: Never    Drug use: Never    Sexual activity: Never   Lifestyle    Physical activity:     Days per week: None     Minutes per session: None    Stress: None   Relationships    Social connections:     Talks on phone: None     Gets together: None     Attends [employer]ism service: None     Active member of club or organization: None     Attends meetings of clubs or organizations: None     Relationship status: None    Intimate partner violence:     Fear of current or ex partner: None     Emotionally abused: None     Physically abused: None     Forced sexual activity: None   Other Topics Concern    None   Social History Narrative    None       Family History: non-contributory  Review of Systems   Musculoskeletal: Positive for gait problem  Neurological: Positive for weakness  Vitals:    07/01/19 0625   BP: 116/56   Pulse: 73   Resp: 16   Temp: (!) 97 °F (36 1 °C)   SpO2: 98%       Physical Exam   Constitutional: Awake and Alert  Well-developed and well-nourished  Mild lethargy and distress  HENT:   Head: Normocephalic and atraumatic  Mouth/Throat: Oropharynx is clear and moist    Cardiovascular: RRR with normal heart sounds  Exam reveals no friction rub  No murmur heard  Pulmonary/Chest: Effort normal and breath sounds normal  No respiratory distress  Patient has no wheezes, rales, or rhonchi  Abdominal: Soft  Bowel sounds are normal  No distension  There is no tenderness   There is no rebound and no guarding  Known RUQ mass appreciated  Neurological: No sensory deficit  Coordination normal    Skin: Skin is warm and dry  No rash noted  No diaphoresis  No erythema  No edema  No cyanosis  Assessment     Dayanara Redman is a(n) [de-identified]y o  year old male with acute ESBL positive UTI and possible urosepsis    1  ESBL positive UTI with possible urosepsis  Contact precautions in place  Will change the patient to Ertapenem 500mg every 24 hours for 10 days (renally adjusted)  May consider transition to PO Bactrim or Augmentin on outpatient basis once medically stable  2  Cardiac with history of Hypertension and CAD  Amlodipine 10mg once daily  Will hold addition of ACE-I given SILVERIO  I will hold aspirin until hemeoccult test comes back to rule out acute bleed  3  Type 2 Diabetes Mellitus  Levemir 10 units at bedtime and low dose sliding scale  Accu checks ACHS  Carb Controlled Diet  4  Acute on Chronic Kidney Disease  Nephrology has been consulted  Normal saline 125cc/hr  CMP tomorrow  5  Iron Deficiency Anemia  Ferrous Sulfate  Will recheck iron levels and hemeoccult to rule out acute bleed  May also be attributed to worsening renal function for which I have consulted Nephrology for their input  CBC tomorrow  6  Hypokalemia  KCl 20MEq oral solution twice per day  CMP tomorrow  7  Generalized Weakness/Gait Disturbance  PT/OT  8  DJD/OA and gout  Tylenol as needed  Colchicine 0 3mg once daily (renally adjusted)  9  GERD  Protonix  10  Nausea  Zofran as needed  11  Bladder Cancer/Urine Retention/BPH  Flomax  Urology has been consulted for their input, given frequency of UTI's  VTE prophylaxis: Will wait on pharmacologic prophylaxis given worsening anemia  Patient may have sequential compression device  Prognosis: Fair  Discharge Plan: In progress  Advanced Directives: I have discussed in detail the patient the advanced directives  The patient does not have a POA or a living will   When discussing cardiac and pulmonary resuscitation efforts with the patient, the patient wishes to be a FULL CODE  I have spent more than 50 minutes gathering data, doing physical examination, and discussing the advanced directives, which was witnessed by caring staff  The patient was discussed with Dr Deborah Hurley and he is in agreement with the above note

## 2019-07-01 NOTE — SOCIAL WORK
Evaluated the pt at the bedside  Pt states he was in the hospital one month ago and was recently discharged to home from [facility] after STR  Pt was admitted to the hospital on 6/30/19 with generalized weakness  PT states he lives with his SO in a one story home with 4 BETH  Pt states he was active with  [facility] and they recently put a new garcia catheter in  Pt reports he ambulates with a walker at home  PT has a cane and walker at home  Pt gets his medications from [address]  Pt has a pending nephrology and urology consult  PT will need to be evaluated by PT/OT for further recommendations  Will continue to follow  Patient/caregiver received discharge checklist   Content reviewed  Patient  encouraged to participate in discharge plan of care prior to discharge home  CM reviewed d/c planning process including the following: identifying help at home, patient preference for d/c planning needs, availability of treatment team to discuss questions or concerns patient and/or family may have regarding understanding medications and recognizing signs and symptoms once discharged  CM also encouraged patient to follow up with all recommended appointments after discharge  Patient advised of importance for patient and family to participate in managing patients medical well being

## 2019-07-01 NOTE — PLAN OF CARE
Problem: Potential for Falls  Goal: Patient will remain free of falls  Description  INTERVENTIONS:  - Assess patient frequently for physical needs  -  Identify cognitive and physical deficits and behaviors that affect risk of falls    -  [place] fall precautions as indicated by assessment   - Educate patient/family on patient safety including physical limitations  - Instruct patient to call for assistance with activity based on assessment  - Modify environment to reduce risk of injury  - Consider OT/PT consult to assist with strengthening/mobility  6/30/2019 2202 by Brittney Agee RN  Outcome: Progressing  6/30/2019 2201 by Brittney Agee RN  Outcome: Progressing     Problem: Prexisting or High Potential for Compromised Skin Integrity  Goal: Skin integrity is maintained or improved  Description  INTERVENTIONS:  - Identify patients at risk for skin breakdown  - Assess and monitor skin integrity  - Assess and monitor nutrition and hydration status  - Monitor labs (i e  albumin)  - Assess for incontinence   - Turn and reposition patient  - Assist with mobility/ambulation  - Relieve pressure over bony prominences  - Avoid friction and shearing  - Provide appropriate hygiene as needed including keeping skin clean and dry  - Evaluate need for skin moisturizer/barrier cream  - Collaborate with interdisciplinary team (i e  Nutrition, Rehabilitation, etc )   - Patient/family teaching  6/30/2019 2202 by Brittney Agee RN  Outcome: Progressing  6/30/2019 2201 by Brittney Agee RN  Outcome: Progressing     Problem: PAIN - ADULT  Goal: Verbalizes/displays adequate comfort level or baseline comfort level  Description  Interventions:  - Encourage patient to monitor pain and request assistance  - Assess pain using appropriate pain scale  - Administer analgesics based on type and severity of pain and evaluate response  - Implement non-pharmacological measures as appropriate and evaluate response  - Consider cultural and social influences on pain and pain management  - Notify physician/advanced practitioner if interventions unsuccessful or patient reports new pain  Outcome: Progressing     Problem: INFECTION - ADULT  Goal: Absence or prevention of progression during hospitalization  Description  INTERVENTIONS:  - Assess and monitor for signs and symptoms of infection  - Monitor lab/diagnostic results  - Monitor all insertion sites, i e  indwelling lines, tubes, and drains  - Monitor endotracheal (as able) and nasal secretions for changes in amount and color  - [place] appropriate cooling/warming therapies per order  - Administer medications as ordered  - Instruct and encourage patient and family to use good hand hygiene technique  - Identify and instruct in appropriate isolation precautions for identified infection/condition  Outcome: Progressing  Goal: Absence of fever/infection during neutropenic period  Description  INTERVENTIONS:  - Monitor WBC  - Implement neutropenic guidelines  Outcome: Progressing     Problem: SAFETY ADULT  Goal: Maintain or return to baseline ADL function  Description  INTERVENTIONS:  -  Assess patient's ability to carry out ADLs; assess patient's baseline for ADL function and identify physical deficits which impact ability to perform ADLs (bathing, care of mouth/teeth, toileting, grooming, dressing, etc )  - Assess/evaluate cause of self-care deficits   - Assess range of motion  - Assess patient's mobility; develop plan if impaired  - Assess patient's need for assistive devices and provide as appropriate  - Encourage maximum independence but intervene and supervise when necessary  ¯ Involve family in performance of ADLs  ¯ Assess for home care needs following discharge   ¯ Request OT consult to assist with ADL evaluation and planning for discharge  ¯ Provide patient education as appropriate  Outcome: Progressing  Goal: Maintain or return mobility status to optimal level  Description  INTERVENTIONS:  - Assess patient's baseline mobility status (ambulation, transfers, stairs, etc )    - Identify cognitive and physical deficits and behaviors that affect mobility  - Identify mobility aids required to assist with transfers and/or ambulation (gait belt, sit-to-stand, lift, walker, cane, etc )  - [place] fall precautions as indicated by assessment  - Record patient progress and toleration of activity level on Mobility SBAR; progress patient to next Phase/Stage  - Instruct patient to call for assistance with activity based on assessment  - Request Rehabilitation consult to assist with strengthening/weightbearing, etc   Outcome: Progressing     Problem: DISCHARGE PLANNING  Goal: Discharge to home or other facility with appropriate resources  Description  INTERVENTIONS:  - Identify barriers to discharge w/patient and caregiver  - Arrange for needed discharge resources and transportation as appropriate  - Identify discharge learning needs (meds, wound care, etc )  - Arrange for interpretive services to assist at discharge as needed  - Refer to Case Management Department for coordinating discharge planning if the patient needs post-hospital services based on physician/advanced practitioner order or complex needs related to functional status, cognitive ability, or social support system  Outcome: Progressing     Problem: Knowledge Deficit  Goal: Patient/family/caregiver demonstrates understanding of disease process, treatment plan, medications, and discharge instructions  Description  Complete learning assessment and assess knowledge base    Interventions:  - Provide teaching at level of understanding  - Provide teaching via preferred learning methods  Outcome: Progressing

## 2019-07-01 NOTE — PROGRESS NOTES
Patient sleeping  Spoke with the family  They said the doctor was in this morning and explained everything to them  No concerns at this time

## 2019-07-01 NOTE — PLAN OF CARE
Problem: PHYSICAL THERAPY ADULT  Goal: Performs mobility at highest level of function for planned discharge setting  See evaluation for individualized goals  Description  Treatment/Interventions: Functional transfer training, LE strengthening/ROM, Therapeutic exercise, Endurance training, Bed mobility, Gait training, Equipment eval/education, Spoke to case management, OT(& neuro re-education w/balance training)  Equipment Recommended: Walker(pt  has own)    See flowsheet documentation for full assessment, interventions and recommendations  Vince Najera PT     Note:   Prognosis: Fair  Problem List: Decreased strength, Decreased endurance, Impaired balance, Decreased mobility, Decreased coordination, Decreased safety awareness  Assessment: Pt is [de-identified] y o  male seen for PT evaluation s/p admit [facility] on 6/30/2019 w/ Urinary tract infection due to extended-spectrum beta lactamase (ESBL) producing Escherichia coli  PT consulted to assess pt's functional mobility and d/c needs  Order placed for PT eval and tx, w/ activity as tolerated order  Comorbidities affecting pt's physical performance at time of assessment include: weakness, UTI, SILVERIO on chronic kidney disease, chronic indwelling garcia catheter, DM, anemia, HTN  PTA, pt was independent w/ all functional mobility w/ AD  Personal factors affecting pt at time of IE include: ambulating w/ assistive device, stairs to enter home, inability to ambulate household distances, inability to navigate community distances, inability to navigate level surfaces w/o external assistance, unable to perform dynamic tasks in community, positive fall history, unable to perform physical activity, limited insight into impairments and inability to perform ADLs   Please find objective findings from PT assessment regarding body systems outlined above with impairments and limitations including weakness, impaired balance, decreased endurance, impaired coordination, gait deviations, decreased activity tolerance, decreased functional mobility tolerance, decreased safety awareness, impaired judgement and fall risk  The following objective measures performed on IE also reveal limitations: Barthel Index: 20/100  Pt's clinical presentation is currently unstable/unpredictable  Pt to benefit from continued PT tx to address deficits as defined above and maximize level of functional independent mobility and consistency  From PT/mobility standpoint, recommendation at time of d/c would be STR pending progress in order to facilitate return to PLOF  Barriers to Discharge: Inaccessible home environment     Recommendation: Short-term skilled PT     PT - OK to Discharge: Yes(if medically stable to STR)    See flowsheet documentation for full assessment     Elsy Draper, PT

## 2019-07-01 NOTE — PLAN OF CARE
Problem: DISCHARGE PLANNING - CARE MANAGEMENT  Goal: Discharge to post-acute care or home with appropriate resources  Description  INTERVENTIONS:  - Conduct assessment to determine patient/family and health care team treatment goals, and need for post-acute services based on payer coverage, community resources, and patient preferences, and barriers to discharge  - Address psychosocial, clinical, and financial barriers to discharge as identified in assessment in conjunction with the patient/family and health care team  - Arrange appropriate level of post-acute services according to patient's   needs and preference and payer coverage in collaboration with the physician and health care team  - Communicate with and update the patient/family, physician, and health care team regarding progress on the discharge plan  - Arrange appropriate transportation to post-acute venues  Pt will need PT/OT for further recommendations  Pt was just discharged from [facility]     Outcome: Progressing

## 2019-07-01 NOTE — PHYSICAL THERAPY NOTE
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 ([facility] )    Acute kidney injury superimposed on chronic kidney disease ([facility] )    Acute pain of left knee    Pressure injury of right heel, unstageable ([facility] )    Atherosclerosis of artery of extremity with ulceration (HCC)    Carcinoid syndrome (HCC)    Sepsis ([place] [address] )    Gram-negative bacteremia    Left hip pain    Acute cystitis with hematuria    Chronic indwelling Haile catheter    Moderate protein-calorie malnutrition (HCC)    Biliary sludge    Urinary tract infection due to extended-spectrum beta lactamase (ESBL) producing Escherichia coli       Past Medical History  Past Medical History:   Diagnosis Date    BPH (benign prostatic hyperplasia)     Cancer ([place] [address] )     liver cancer    Cardiac disease     Coronary artery disease     Diabetes mellitus ([facility] )     DJD (degenerative joint disease)     Gait disturbance     Generalized weakness     GERD (gastroesophageal reflux disease)     Gout     Hyperlipidemia     Hypertension     Renal disorder        Past Surgical History  Past Surgical History:   Procedure Laterality Date    CORONARY ANGIOPLASTY WITH STENT PLACEMENT      IR IMAGE GUIDED BIOPSY/ASPIRATION LIVER  1/24/2019 07/01/19 1250   Note Type   Note type Eval only   Pain Assessment   Pain Assessment No/denies pain   Pain Score No Pain   Home Living   Type of Home House   Home Layout Performs ADLs on one level;Able to live on main level with bedroom/bathroom;Stairs to enter with rails  (2 small BETH through garage w/HR)   Bathroom Shower/Tub Tub/shower unit   [address] [place] [address] Dr guerra   [facility]  (rolling)   Prior Function   Level of [place] Independent with ADLs and functional mobility; Needs assistance with IADLs   Lives With Significant other   Receives Help From Family   ADL Assistance Independent   IADLs Needs assistance   Falls in the last 6 months 1 to 4   Vocational Retired   Comments Patient was receiving [address] PTA, recent d/c from [employer]  for STR  Restrictions/Precautions   Weight Bearing Precautions Per Order No   Other Precautions Fall Risk;Contact/isolation   General   Family/Caregiver Present No   Cognition   Overall Cognitive Status WFL   Arousal/Participation Responsive   Orientation Level Oriented X4   Memory Within functional limits   Following Commands Follows one step commands with increased time or repetition   RUE Assessment   RUE Assessment WFL   LUE Assessment   LUE Assessment WFL   RLE Assessment   RLE Assessment X   Strength RLE   R Hip Flexion 3-/5   R Knee Extension 3-/5   R Ankle Dorsiflexion 3-/5   LLE Assessment   LLE Assessment X   Strength LLE   L Hip Flexion 3-/5   L Knee Extension 3-/5   L Ankle Dorsiflexion 3-/5   Coordination   Movements are Fluid and Coordinated 0   Coordination and Movement Description Incremental mobility requiring external verbal & tactile cues of 2  Bed Mobility   Rolling L 4  Minimal assistance   Additional items Assist x 1;Bedrails; Increased time required;Verbal cues;LE management   Supine to Sit 3  Moderate assistance   Additional items Assist x 2;Bedrails; Increased time required;Verbal cues;LE management   Sit to Supine 3  Moderate assistance   Additional items Assist x 2;Bedrails; Increased time required;Verbal cues;LE management   Transfers   Sit to Stand 3 Moderate assistance   Additional items Assist x 2; Increased time required;Verbal cues   Stand to Sit 3  Moderate assistance   Additional items Assist x 2;Bedrails;Verbal cues; Increased time required   Stand pivot Unable to assess  (2/2 safety concerns, inability to maintain standing >10 sec )   Ambulation/Elevation   Gait pattern Improper Weight shift;Narrow AMANDO; Forward Flexion;Decreased foot clearance; Short stride   Gait Assistance 3  Moderate assist   Additional items Assist x 2;Verbal cues; Tactile cues  (consistently )   Assistive Device Rolling walker   Distance 1 foot toward [facility]   Stair Management Assistance Not tested   Balance   Static Sitting Fair   Dynamic Sitting Fair -   Static Standing Poor +   Dynamic Standing Poor   Ambulatory Poor   Endurance Deficit   Endurance Deficit Yes   Endurance Deficit Description Patient exhibited increased fatigue w/ minimal mobility  Activity Tolerance   Activity Tolerance Patient limited by fatigue   Nurse Made Aware yes   Assessment   Prognosis Fair   Problem List Decreased strength;Decreased endurance; Impaired balance;Decreased mobility; Decreased coordination;Decreased safety awareness   Assessment Pt is [de-identified] y o  male seen for PT evaluation s/p admit to [address] [place] [address] on 6/30/2019 w/ Urinary tract infection due to extended-spectrum beta lactamase (ESBL) producing Escherichia coli  PT consulted to assess pt's functional mobility and d/c needs  Order placed for PT eval and tx, w/ activity as tolerated order  Comorbidities affecting pt's physical performance at time of assessment include: weakness, UTI, SILVERIO on chronic kidney disease, chronic indwelling garcia catheter, DM, anemia, HTN  PTA, pt was independent w/ all functional mobility w/ AD   Personal factors affecting pt at time of IE include: ambulating w/ assistive device, stairs to enter home, inability to ambulate household distances, inability to navigate community distances, inability to navigate level surfaces w/o external assistance, unable to perform dynamic tasks in community, positive fall history, unable to perform physical activity, limited insight into impairments and inability to perform ADLs  Please find objective findings from PT assessment regarding body systems outlined above with impairments and limitations including weakness, impaired balance, decreased endurance, impaired coordination, gait deviations, decreased activity tolerance, decreased functional mobility tolerance, decreased safety awareness, impaired judgement and fall risk  The following objective measures performed on IE also reveal limitations: Barthel Index: 20/100  Pt's clinical presentation is currently unstable/unpredictable  Pt to benefit from continued PT tx to address deficits as defined above and maximize level of functional independent mobility and consistency  From PT/mobility standpoint, recommendation at time of d/c would be STR pending progress in order to facilitate return to PLOF  Barriers to Discharge Inaccessible home environment   Goals   Patient Goals feel better   LTG Expiration Date 07/11/19   Long Term Goal #1 1 )Patient will complete bed mobility with supervision of 1 for decrease need for caregiver assistance, decrease burden of care  2 ) Patient will complete transfers with min A of 1 to decrease risk of falls, facilitate upright standing posture  3 ) BLE strength to greater than/equal to 4-/5 gross musculature to increase ability to safely transfer, control descent to chair  4 ) Patient will exhibit increase static standing balance to Fair+, for 1-3 minutes  without LOB and min A  of 1 to improve activity tolerance and endurance 5 ) Patient will exhibit increase dynamic ambulatory balance to Fair+ for 100 feet w/RW min A of 1 to improve ability to mobilize to toilet, chair and decrease risk for additional medical complications   6 ) Patient will exhibit good self monitoring and ability to follow 2 step commands to increase complexity of tasks and resume ADL's without LOB  Treatment Day 0   Plan   Treatment/Interventions Functional transfer training;LE strengthening/ROM; Therapeutic exercise; Endurance training;Bed mobility;Gait training;Equipment eval/education;Spoke to case management;OT  (& neuro re-education w/balance training)   PT Frequency Other (Comment)  (3-5x/week)   Recommendation   Recommendation Short-term skilled PT   Equipment Recommended Walker  (pt  has own)   PT - OK to Discharge Yes  (if medically stable to STR)   Additional Comments Upon conclusion, patient was resting in bed w/all needs within reach     Barthel Index   Feeding 5   Bathing 0   Grooming Score 0   Dressing Score 5   Bladder Score 0   Bowels Score 5   Toilet Use Score 0   Transfers (Bed/Chair) Score 5   Mobility (Level Surface) Score 0   Stairs Score 0   Barthel Index Score 20     Jose Spear, PT

## 2019-07-02 LAB
ALBUMIN SERPL BCP-MCNC: 2.9 G/DL (ref 3.5–5.7)
ALP SERPL-CCNC: 71 U/L (ref 55–165)
ALT SERPL W P-5'-P-CCNC: 6 U/L (ref 7–52)
ANION GAP SERPL CALCULATED.3IONS-SCNC: 8 MMOL/L (ref 4–13)
AST SERPL W P-5'-P-CCNC: 6 U/L (ref 13–39)
BASOPHILS # BLD AUTO: 0 THOUSANDS/ΜL (ref 0–0.1)
BASOPHILS NFR BLD AUTO: 1 % (ref 0–2)
BILIRUB SERPL-MCNC: 1 MG/DL (ref 0.2–1)
BUN SERPL-MCNC: 39 MG/DL (ref 7–25)
CALCIUM SERPL-MCNC: 8.5 MG/DL (ref 8.6–10.5)
CHLORIDE SERPL-SCNC: 110 MMOL/L (ref 98–107)
CO2 SERPL-SCNC: 20 MMOL/L (ref 21–31)
CREAT SERPL-MCNC: 2.28 MG/DL (ref 0.7–1.3)
CREAT UR-MCNC: 50.5 MG/DL
EOSINOPHIL # BLD AUTO: 0 THOUSAND/ΜL (ref 0–0.61)
EOSINOPHIL NFR BLD AUTO: 0 % (ref 0–5)
ERYTHROCYTE [DISTWIDTH] IN BLOOD BY AUTOMATED COUNT: 18.2 % (ref 11.5–14.5)
FERRITIN SERPL-MCNC: 698 NG/ML (ref 8–388)
GFR SERPL CREATININE-BSD FRML MDRD: 26 ML/MIN/1.73SQ M
GLUCOSE SERPL-MCNC: 172 MG/DL (ref 65–140)
GLUCOSE SERPL-MCNC: 196 MG/DL (ref 65–140)
GLUCOSE SERPL-MCNC: 198 MG/DL (ref 65–99)
GLUCOSE SERPL-MCNC: 207 MG/DL (ref 65–140)
GLUCOSE SERPL-MCNC: 244 MG/DL (ref 65–140)
HCT VFR BLD AUTO: 24.4 % (ref 42–47)
HGB BLD-MCNC: 8.3 G/DL (ref 14–18)
IRON SATN MFR SERPL: 9 %
IRON SERPL-MCNC: 13 UG/DL (ref 65–175)
LYMPHOCYTES # BLD AUTO: 0.6 THOUSANDS/ΜL (ref 0.6–4.47)
LYMPHOCYTES NFR BLD AUTO: 6 % (ref 21–51)
MAGNESIUM SERPL-MCNC: 1.5 MG/DL (ref 1.9–2.7)
MCH RBC QN AUTO: 29.4 PG (ref 26–34)
MCHC RBC AUTO-ENTMCNC: 33.9 G/DL (ref 31–37)
MCV RBC AUTO: 87 FL (ref 81–99)
MICROALBUMIN UR-MCNC: 73.6 MG/L (ref 0–20)
MICROALBUMIN/CREAT 24H UR: 146 MG/G CREATININE (ref 0–30)
MONOCYTES # BLD AUTO: 0.8 THOUSAND/ΜL (ref 0.17–1.22)
MONOCYTES NFR BLD AUTO: 9 % (ref 2–12)
NEUTROPHILS # BLD AUTO: 7.7 THOUSANDS/ΜL (ref 1.4–6.5)
NEUTS SEG NFR BLD AUTO: 84 % (ref 42–75)
PLATELET # BLD AUTO: 136 THOUSANDS/UL (ref 149–390)
PMV BLD AUTO: 9.2 FL (ref 8.6–11.7)
POTASSIUM SERPL-SCNC: 3.7 MMOL/L (ref 3.5–5.5)
PROT SERPL-MCNC: 6.2 G/DL (ref 6.4–8.9)
PROT UR-MCNC: 98 MG/DL
PROT/CREAT UR: 1.94 MG/G{CREAT} (ref 0–0.1)
RBC # BLD AUTO: 2.82 MILLION/UL (ref 4.3–5.9)
SODIUM 24H UR-SCNC: 56 MOL/L
SODIUM SERPL-SCNC: 138 MMOL/L (ref 134–143)
TIBC SERPL-MCNC: 137 UG/DL (ref 250–450)
WBC # BLD AUTO: 9.2 THOUSAND/UL (ref 4.8–10.8)

## 2019-07-02 PROCEDURE — 83735 ASSAY OF MAGNESIUM: CPT | Performed by: INTERNAL MEDICINE

## 2019-07-02 PROCEDURE — 84156 ASSAY OF PROTEIN URINE: CPT | Performed by: INTERNAL MEDICINE

## 2019-07-02 PROCEDURE — 84300 ASSAY OF URINE SODIUM: CPT | Performed by: INTERNAL MEDICINE

## 2019-07-02 PROCEDURE — 85025 COMPLETE CBC W/AUTO DIFF WBC: CPT | Performed by: PHYSICIAN ASSISTANT

## 2019-07-02 PROCEDURE — 80053 COMPREHEN METABOLIC PANEL: CPT | Performed by: PHYSICIAN ASSISTANT

## 2019-07-02 PROCEDURE — 82043 UR ALBUMIN QUANTITATIVE: CPT | Performed by: INTERNAL MEDICINE

## 2019-07-02 PROCEDURE — 82570 ASSAY OF URINE CREATININE: CPT | Performed by: INTERNAL MEDICINE

## 2019-07-02 PROCEDURE — 83540 ASSAY OF IRON: CPT | Performed by: PHYSICIAN ASSISTANT

## 2019-07-02 PROCEDURE — 82728 ASSAY OF FERRITIN: CPT | Performed by: PHYSICIAN ASSISTANT

## 2019-07-02 PROCEDURE — 82948 REAGENT STRIP/BLOOD GLUCOSE: CPT

## 2019-07-02 PROCEDURE — 83550 IRON BINDING TEST: CPT | Performed by: PHYSICIAN ASSISTANT

## 2019-07-02 RX ORDER — SULFAMETHOXAZOLE AND TRIMETHOPRIM 400; 80 MG/1; MG/1
1 TABLET ORAL EVERY 12 HOURS SCHEDULED
Status: DISCONTINUED | OUTPATIENT
Start: 2019-07-02 | End: 2019-07-07 | Stop reason: HOSPADM

## 2019-07-02 RX ORDER — ASPIRIN 81 MG/1
81 TABLET ORAL DAILY
Status: DISCONTINUED | OUTPATIENT
Start: 2019-07-02 | End: 2019-07-07 | Stop reason: HOSPADM

## 2019-07-02 RX ORDER — IRON POLYSACCHARIDE COMPLEX 150 MG
150 CAPSULE ORAL 2 TIMES DAILY
Status: DISCONTINUED | OUTPATIENT
Start: 2019-07-02 | End: 2019-07-04

## 2019-07-02 RX ADMIN — FERROUS SULFATE TAB 325 MG (65 MG ELEMENTAL FE) 325 MG: 325 (65 FE) TAB at 08:00

## 2019-07-02 RX ADMIN — POTASSIUM CHLORIDE 20 MEQ: 20 SOLUTION ORAL at 08:00

## 2019-07-02 RX ADMIN — INSULIN DETEMIR 10 UNITS: 100 INJECTION, SOLUTION SUBCUTANEOUS at 22:54

## 2019-07-02 RX ADMIN — ACETAMINOPHEN 650 MG: 325 TABLET ORAL at 16:51

## 2019-07-02 RX ADMIN — Medication 150 MG: at 17:02

## 2019-07-02 RX ADMIN — Medication 1 CAPSULE: at 16:51

## 2019-07-02 RX ADMIN — SULFAMETHOXAZOLE AND TRIMETHOPRIM 1 TABLET: 400; 80 TABLET ORAL at 11:59

## 2019-07-02 RX ADMIN — INSULIN LISPRO 2 UNITS: 100 INJECTION, SOLUTION INTRAVENOUS; SUBCUTANEOUS at 22:54

## 2019-07-02 RX ADMIN — ACETAMINOPHEN 650 MG: 325 TABLET ORAL at 08:01

## 2019-07-02 RX ADMIN — SODIUM CHLORIDE 500 MG: 9 INJECTION, SOLUTION INTRAVENOUS at 12:00

## 2019-07-02 RX ADMIN — INSULIN LISPRO 2 UNITS: 100 INJECTION, SOLUTION INTRAVENOUS; SUBCUTANEOUS at 12:00

## 2019-07-02 RX ADMIN — SODIUM CHLORIDE 125 ML/HR: 9 INJECTION, SOLUTION INTRAVENOUS at 12:07

## 2019-07-02 RX ADMIN — TAMSULOSIN HYDROCHLORIDE 0.4 MG: 0.4 CAPSULE ORAL at 16:51

## 2019-07-02 RX ADMIN — INSULIN LISPRO 3 UNITS: 100 INJECTION, SOLUTION INTRAVENOUS; SUBCUTANEOUS at 16:52

## 2019-07-02 RX ADMIN — INSULIN LISPRO 1 UNITS: 100 INJECTION, SOLUTION INTRAVENOUS; SUBCUTANEOUS at 08:02

## 2019-07-02 RX ADMIN — PANTOPRAZOLE SODIUM 40 MG: 40 TABLET, DELAYED RELEASE ORAL at 05:33

## 2019-07-02 RX ADMIN — COLCHICINE 0.3 MG: 0.6 TABLET, FILM COATED ORAL at 08:01

## 2019-07-02 RX ADMIN — SULFAMETHOXAZOLE AND TRIMETHOPRIM 1 TABLET: 400; 80 TABLET ORAL at 20:48

## 2019-07-02 RX ADMIN — SODIUM CHLORIDE 125 ML/HR: 9 INJECTION, SOLUTION INTRAVENOUS at 22:55

## 2019-07-02 RX ADMIN — AMLODIPINE BESYLATE 10 MG: 5 TABLET ORAL at 08:01

## 2019-07-02 RX ADMIN — ASPIRIN 81 MG: 81 TABLET, COATED ORAL at 11:59

## 2019-07-02 NOTE — CONSULTS
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  Avoid ACEI at this time  4  ESBL  E  Coli infection on antibiotics         Thank you for allowing us to participate in the care of your patient  Please feel free to contact us regarding the care of this patient, or any other questions/concerns that may be applicable      Patient Active Problem List   Diagnosis    Weakness generalized    Type 2 diabetes mellitus with complication, with long-term current use of insulin ([address] )    Essential hypertension    Mixed hyperlipidemia    Cardiac disease    Generalized abdominal mass    Hydroureter    Anemia    Syncope and collapse    Accelerated hypertension    Liver mass    Neuroendocrine tumor    Acute cystitis without hematuria    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)    Acute kidney injury superimposed on chronic kidney disease (HCC)    Acute pain of left knee    Pressure injury of right heel, unstageable (HCC)    Atherosclerosis of artery of extremity with ulceration (HCC)    Carcinoid syndrome (HCC)    Sepsis (HCC)    Gram-negative bacteremia    Left hip pain    Acute cystitis with hematuria    Chronic indwelling Haile catheter    Moderate protein-calorie malnutrition (HCC)    Biliary sludge    Urinary tract infection due to extended-spectrum beta lactamase (ESBL) producing Escherichia coli       History of Present Illness   Physician Requesting Consult: Petar Greco MD  Reason for Consult / Principal Problem: acute kidney injury  Hx and PE limited by:   HPI: Alin Felix is a [de-identified]y o  year old male who presents with acute kidney injury  He was discharged from [address] [place] [address] on Monday  He had a garcia change on Tuesday  By Friday he had  bubbly drainage from the garcia  He presented to [place] [address] and a tube was reinserted with 700 ml of urinary drainage  He was sent home  On Sunday his appetite was decreased for food and water  He had fever and his wife brought him to the ED  He has acute kidney injury and ESBL E  Coli    History obtained from spouse and chart review    Review of Systems - Negative except as mentioned above in HPI, more specifics as mentioned below  Review of Systems - History obtained from spouse  She denied chest pain, dyspnea, abdominal pain, NVD       Historical Information   Past Medical History:   Diagnosis Date    BPH (benign prostatic hyperplasia)     [facility] )     liver cancer    Cardiac disease     Coronary artery disease     Diabetes mellitus [facility] )     DJD (degenerative joint disease)     Gait disturbance     Generalized weakness     GERD (gastroesophageal reflux disease)     Gout     Hyperlipidemia     Hypertension     Renal disorder      Past Surgical History:   Procedure Laterality Date    CORONARY ANGIOPLASTY WITH STENT PLACEMENT      IR IMAGE GUIDED BIOPSY/ASPIRATION LIVER  1/24/2019     Social History   Social History     Substance and Sexual Activity   Alcohol Use Never    Frequency: Never     Social History     Substance and Sexual Activity   Drug Use Never     Social History     Tobacco Use   Smoking Status Never Smoker   Smokeless Tobacco Never Used     Family History   Problem Relation Age of Onset    Cancer Mother     Hypertension Father     Cancer Brother     Cancer Maternal Grandmother     Cancer Paternal Aunt        Meds/Allergies   all current active meds have been reviewed, current meds:   Current Facility-Administered Medications   Medication Dose Route Frequency    acetaminophen (TYLENOL) tablet 650 mg  650 mg Oral Q4H PRN    amLODIPine (NORVASC) tablet 10 mg  10 mg Oral Daily    b complex-vitamin C-folic acid (NEPHROCAPS) capsule 1 capsule  1 capsule Oral Daily With Dinner    colchicine (COLCRYS) tablet 0 3 mg  0 3 mg Oral Daily    ergocalciferol (VITAMIN D2) capsule 50,000 Units  50,000 Units Oral Weekly    ertapenem (INVanz) 500 mg in sodium chloride 0 9 % 50 mL IVPB  500 mg Intravenous Q24H    ferrous sulfate tablet 325 mg  325 mg Oral Daily With Breakfast    glucose chewable tablet 16 g  16 g Oral Once PRN    insulin detemir (LEVEMIR) subcutaneous injection 10 Units  10 Units Subcutaneous HS    insulin lispro (HumaLOG) 100 units/mL subcutaneous injection 1-6 Units  1-6 Units Subcutaneous 4x Daily (PC & HS)    ondansetron (ZOFRAN) injection 4 mg  4 mg Intravenous Q6H PRN    pantoprazole (PROTONIX) EC tablet 40 mg  40 mg Oral Early Morning    potassium chloride 10 % oral solution 20 mEq  20 mEq Oral BID    simethicone (MYLICON) chewable tablet 80 mg  80 mg Oral Q6H PRN    sodium chloride 0 9 % infusion  125 mL/hr Intravenous Continuous    tamsulosin (FLOMAX) capsule 0 4 mg  0 4 mg Oral Daily With Dinner    and PTA meds:    Medications Prior to Admission   Medication    acetaminophen (TYLENOL) 325 mg tablet    amLODIPine (NORVASC) 2 5 mg tablet    aspirin 81 MG tablet    B Complex-C-Folic Acid (TRIPHROCAPS) 1 MG CAPS    bisacodyl (FLEET) 10 MG/30ML ENEM    cholecalciferol (VITAMIN D3) 1,000 units tablet    Cholecalciferol 66090 units TABS    colchicine (COLCRYS) 0 6 mg tablet    insulin detemir (LEVEMIR) 100 units/mL subcutaneous injection    insulin lispro (HumaLOG) 100 units/mL injection    ondansetron (ZOFRAN) 4 mg tablet    pantoprazole (PROTONIX) 40 mg tablet    simethicone (MYLICON) 80 mg chewable tablet    tamsulosin (FLOMAX) 0 4 mg    ferrous sulfate 325 (65 Fe) mg tablet    fosfomycin (MONUROL) 3 g    glucagon (GLUCAGON EMERGENCY) 1 MG injection No Known Allergies    Objective     Intake/Output Summary (Last 24 hours) at 7/2/2019 1047  Last data filed at 7/2/2019 0500  Gross per 24 hour   Intake 1360 ml   Output 1301 ml   Net 59 ml       Invasive Devices:   Urethral Catheter Latex 16 Fr  (Active)   Site Assessment Skin intact; Clean 7/1/2019  8:01 AM   Collection Container Standard drainage bag 7/1/2019  8:01 AM   Securement Method Securing device (Describe) 7/1/2019  8:01 AM   Output (mL) 500 mL 7/1/2019  9:00 PM       Physical Exam      I/O last 3 completed shifts: In: 5226 [P O :720; I V :2000]  Out: 1901 [Urine:1900; Stool:1]    Vitals:    07/02/19 0752   BP: 120/60   Pulse: 79   Resp: 18   Temp: 100 3 °F (37 9 °C)   SpO2: 96%       Gen: in NAD,frail  HEENT: no sclerous icterus, MMM, neck supple  CV: +S1/S2, RRR  Lungs: dec bilaterally  Abd: +BS, soft NT/ND  Ext: all four extremities are warm  Skin: no rashes noted  Neuro: CN II-XII not checked due to sleepiness    Current Weight: Weight - Scale: 69 9 kg (154 lb 1 oz)(Weigh bed)  First Weight: Weight - Scale: 72 kg (158 lb 11 7 oz)    Lab Results:  I have personally reviewed pertinent labs      CBC:   Lab Results   Component Value Date    WBC 9 20 07/02/2019    HGB 8 3 (L) 07/02/2019    HCT 24 4 (L) 07/02/2019    MCV 87 07/02/2019     (L) 07/02/2019    MCH 29 4 07/02/2019    MCHC 33 9 07/02/2019    RDW 18 2 (H) 07/02/2019    MPV 9 2 07/02/2019     CMP:   Lab Results   Component Value Date    K 3 7 07/02/2019     (H) 07/02/2019    CO2 20 (L) 07/02/2019    BUN 39 (H) 07/02/2019    CREATININE 2 28 (H) 07/02/2019    CALCIUM 8 5 (L) 07/02/2019    AST 6 (L) 07/02/2019    ALT 6 (L) 07/02/2019    ALKPHOS 71 07/02/2019    EGFR 26 07/02/2019     Phosphorus: No results found for: PHOS  Magnesium: No results found for: MG  Urinalysis: No results found for: COLORU, CLARITYU, SPECGRAV, PHUR, LEUKOCYTESUR, NITRITE, PROTEINUA, GLUCOSEU, KETONESU, BILIRUBINUR, BLOODU  Ionized Calcium: No results found for: CAION  Coagulation: No results found for: PT, INR, APTT  Troponin: No results found for: TROPONINI  ABG: No results found for: PHART, NEB0WQB, PO2ART, CZK1EXG, H2QBHQKL, BEART, SOURCE    Results from last 7 days   Lab Units 07/02/19  0532 07/01/19  0459 06/30/19  1942   POTASSIUM mmol/L 3 7 3 2* 3 5   CHLORIDE mmol/L 110* 106 101   CO2 mmol/L 20* 18* 22   BUN mg/dL 39* 41* 38*   CREATININE mg/dL 2 28* 2 43* 2 36*   CALCIUM mg/dL 8 5* 8 7 9 6   ALK PHOS U/L 71 76  --    ALT U/L 6* 6*  --    AST U/L 6* 6*  --        Radiology review:  Procedure: Xr Chest 1 View Portable    Result Date: 7/1/2019  Narrative: CHEST INDICATION:   WEAKNESS  COMPARISON:  6/3/2019  EXAM PERFORMED/VIEWS:  XR CHEST PORTABLE FINDINGS: Cardiomediastinal silhouette appears unremarkable  The lungs are clear  No pneumothorax or pleural effusion  Osseous structures appear within normal limits for patient age  Impression: No acute cardiopulmonary disease  Workstation performed: [id number]     Procedure: Us Kidney And Bladder    Result Date: 7/1/2019  Narrative: RENAL ULTRASOUND INDICATION:   RENAL FAILURE, ACUTE URINARY TRACT INFECTION ,  COMPARISON: 6/5/2019; 6/4/2019 TECHNIQUE:   Ultrasound of the retroperitoneum was performed with a curvilinear transducer utilizing volumetric sweeps and still imaging techniques  FINDINGS: KIDNEYS: Symmetric and normal size  Right kidney:  12 9 cm  Left kidney:  12 4 cm  Right kidney Normal echogenicity and contour  No suspicious masses detected  No hydronephrosis  No shadowing calculi  No perinephric fluid collections  Left kidney Normal echogenicity and contour  No suspicious masses detected  No hydronephrosis  No shadowing calculi  No perinephric fluid collections  URETERS: Nonvisualized  BLADDER: A garcia catheter is in place, decompressing the bladder and limiting its evaluation  No focal thickening or mass lesions  Bilateral ureteral jets detected   A few echogenic hepatic masses are incompletely imaged, similar to the recent prior ultrasound and CT studies but incompletely characterized  Impression: 1  No hydronephrosis  2   Haile catheter within the bladder  3   Hepatic mass lesions redemonstrated, incompletely imaged  Please see recent prior ultrasound and CT from a few weeks ago  Workstation performed: [id number]         EKG, Pathology, and Other Studies: reviewed      Counseling / Coordination of Care  Total ADDITIONAL floor / unit time spent today 25 minutes  Greater than 50% of total time was spent with the patient and / or family counseling and / or coordination of care   A description of the counseling / coordination of care: follows    China Mobley MD

## 2019-07-02 NOTE — PLAN OF CARE
Problem: Potential for Falls  Goal: Patient will remain free of falls  Description  INTERVENTIONS:  - Assess patient frequently for physical needs  -  Identify cognitive and physical deficits and behaviors that affect risk of falls    -  [place] fall precautions as indicated by assessment   - Educate patient/family on patient safety including physical limitations  - Instruct patient to call for assistance with activity based on assessment  - Modify environment to reduce risk of injury  - Consider OT/PT consult to assist with strengthening/mobility  Outcome: Progressing     Problem: Prexisting or High Potential for Compromised Skin Integrity  Goal: Skin integrity is maintained or improved  Description  INTERVENTIONS:  - Identify patients at risk for skin breakdown  - Assess and monitor skin integrity  - Assess and monitor nutrition and hydration status  - Monitor labs (i e  albumin)  - Assess for incontinence   - Turn and reposition patient  - Assist with mobility/ambulation  - Relieve pressure over bony prominences  - Avoid friction and shearing  - Provide appropriate hygiene as needed including keeping skin clean and dry  - Evaluate need for skin moisturizer/barrier cream  - Collaborate with interdisciplinary team (i e  Nutrition, Rehabilitation, etc )   - Patient/family teaching  Outcome: Progressing     Problem: PAIN - ADULT  Goal: Verbalizes/displays adequate comfort level or baseline comfort level  Description  Interventions:  - Encourage patient to monitor pain and request assistance  - Assess pain using appropriate pain scale  - Administer analgesics based on type and severity of pain and evaluate response  - Implement non-pharmacological measures as appropriate and evaluate response  - Consider cultural and social influences on pain and pain management  - Notify physician/advanced practitioner if interventions unsuccessful or patient reports new pain  Outcome: Progressing     Problem: INFECTION - ADULT  Goal: Absence or prevention of progression during hospitalization  Description  INTERVENTIONS:  - Assess and monitor for signs and symptoms of infection  - Monitor lab/diagnostic results  - Monitor all insertion sites, i e  indwelling lines, tubes, and drains  - Monitor endotracheal (as able) and nasal secretions for changes in amount and color  - [place] appropriate cooling/warming therapies per order  - Administer medications as ordered  - Instruct and encourage patient and family to use good hand hygiene technique  - Identify and instruct in appropriate isolation precautions for identified infection/condition  Outcome: Progressing     Problem: SAFETY ADULT  Goal: Maintain or return to baseline ADL function  Description  INTERVENTIONS:  -  Assess patient's ability to carry out ADLs; assess patient's baseline for ADL function and identify physical deficits which impact ability to perform ADLs (bathing, care of mouth/teeth, toileting, grooming, dressing, etc )  - Assess/evaluate cause of self-care deficits   - Assess range of motion  - Assess patient's mobility; develop plan if impaired  - Assess patient's need for assistive devices and provide as appropriate  - Encourage maximum independence but intervene and supervise when necessary  ¯ Involve family in performance of ADLs  ¯ Assess for home care needs following discharge   ¯ Request OT consult to assist with ADL evaluation and planning for discharge  ¯ Provide patient education as appropriate  Outcome: Progressing  Goal: Maintain or return mobility status to optimal level  Description  INTERVENTIONS:  - Assess patient's baseline mobility status (ambulation, transfers, stairs, etc )    - Identify cognitive and physical deficits and behaviors that affect mobility  - Identify mobility aids required to assist with transfers and/or ambulation (gait belt, sit-to-stand, lift, walker, cane, etc )  - [place] fall precautions as indicated by assessment  - Record patient progress and toleration of activity level on Mobility SBAR; progress patient to next Phase/Stage  - Instruct patient to call for assistance with activity based on assessment  - Request Rehabilitation consult to assist with strengthening/weightbearing, etc   Outcome: Progressing     Problem: DISCHARGE PLANNING  Goal: Discharge to home or other facility with appropriate resources  Description  INTERVENTIONS:  - Identify barriers to discharge w/patient and caregiver  - Arrange for needed discharge resources and transportation as appropriate  - Identify discharge learning needs (meds, wound care, etc )  - Arrange for interpretive services to assist at discharge as needed  - Refer to Case Management Department for coordinating discharge planning if the patient needs post-hospital services based on physician/advanced practitioner order or complex needs related to functional status, cognitive ability, or social support system  Outcome: Progressing     Problem: Knowledge Deficit  Goal: Patient/family/caregiver demonstrates understanding of disease process, treatment plan, medications, and discharge instructions  Description  Complete learning assessment and assess knowledge base    Interventions:  - Provide teaching at level of understanding  - Provide teaching via preferred learning methods  Outcome: Progressing     Problem: DISCHARGE PLANNING - CARE MANAGEMENT  Goal: Discharge to post-acute care or home with appropriate resources  Description  INTERVENTIONS:  - Conduct assessment to determine patient/family and health care team treatment goals, and need for post-acute services based on payer coverage, community resources, and patient preferences, and barriers to discharge  - Address psychosocial, clinical, and financial barriers to discharge as identified in assessment in conjunction with the patient/family and health care team  - Arrange appropriate level of post-acute services according to patient's   needs and preference and payer coverage in collaboration with the physician and health care team  - Communicate with and update the patient/family, physician, and health care team regarding progress on the discharge plan  - Arrange appropriate transportation to post-acute venues  Pt will need PT/OT for further recommendations  Pt was just discharged from [facility]     Outcome: Progressing

## 2019-07-02 NOTE — PROGRESS NOTES
Beltran Scanlon#  SEJ:[id number] Nathaniel Madrid  AGN:[id number]    IGW:[id number]  Adm Date: 6/30/2019 1855  6:55 PM   ATT PHY: Alonzo Essex, [address]         Subjective     The patient was seen after reviewing the chart and discussing the case with caring staff  Today during our encounter, the patient reported continued unchanged weakness  He otherwise denies any acute concerns  It was advised by Dr Lluvia Candelaria yesterday that the patient receive a US of the kidneys and bladder and to rule out epididymitis  Patient denies any scrotal pain  Labs today were significant for resolved WBC count at 9 20, H/H that is slightly improved at 8 3/24 4, normal potassium, and iron panel demonstrating iron of 13, ferritin or 698, TIBC of 137, and iron saturation of 9%  HOBT was negative and [place] has remained clear of blood  Objective     Vitals:    07/02/19 0752   BP: 120/60   Pulse: 79   Resp: 18   Temp: 100 3 °F (37 9 °C)   SpO2: 96%       General Appearance: Awake and Alert  No acute distress  HEENT: Normocephalic, atraumatic  PERRLA, EOMI, MMM  Heart: RRR, no murmurs  Normal S1 and S2   Lungs: CTA bilaterally with fair air entry  Genital Examination: Examination was limited, as the patient was laying in a mild amount of loose stool  The testes did not demonstrate overt inflammation or evidence of hydrocele, though there was evidence of erythema (R>L) and tenderness to palpation of the right posterior scrotum  Assessment     Nimo Kinsey is a(n) [de-identified]y o  year old male with acute ESBL positive UTI and possible urosepsis     1  ESBL positive UTI with possible urosepsis  Sepsis diagnosis is possible as evidenced by elevated WBC count and fever, but there was no evidence of hypotension or tachycardia  Patient now has low grade fever and resolution in WBC count  Continue Ertapenem 500mg every 24 hours for 9 more days (renally adjusted)  2  Epididymitis   I will add PO Bactrim SS every 12 hours for this for additional coverage  Bactrim is also a sensitive antibiotic for his above UTI, and so can be transitioned to this a single PO option once discharged  3  Cardiac with history of Hypertension and CAD  Amlodipine 10mg once daily  Will hold addition of ACE-I given SILVERIO  I will restart aspirin therapy  4  Type 2 Diabetes Mellitus  Levemir 10 units at bedtime and low dose sliding scale  Accu checks ACHS  Carb Controlled Diet  5  Acute on Chronic Kidney Disease  Improved  Nephrology is on consult and believes that this may be a result of ATN  Normal saline 125cc/hr  CMP tomorrow  5  Iron Deficiency Anemia  Anemia is likely complicated by SILVERIO as well  I will change the patient to Niferex 150mg twice per day  I consulted with Dr Abby Salazar who wishes to closely monitor CBC before changing the patient to IV iron  CBC tomorrow  6  Hypokalemia  Resolved  Potassium will be discontinued  CMP tomorrow  7  Generalized Weakness/Gait Disturbance  PT/OT  8  DJD/OA and gout  Tylenol as needed  Colchicine 0 3mg once daily (renally adjusted)  9  GERD  Protonix  10  Nausea  Zofran as needed  11  Bladder Cancer/Urine Retention/BPH  Flomax  Urology will continue to follow along as needed      VTE prophylaxis: Although there is no evidence of acute bleed, given low H/H, Will wait on pharmacologic prophylaxis  Patient may have sequential compression device  Discharge planning: PT recommends STR again, but the family is more so interested in home PT along with his already schedule [facility]  Will coordinate this with case management  The patient was discussed with Dr Abby Salazar and he is in agreement with the above note

## 2019-07-03 LAB
ALBUMIN SERPL BCP-MCNC: 2.9 G/DL (ref 3.5–5.7)
ALP SERPL-CCNC: 80 U/L (ref 55–165)
ALT SERPL W P-5'-P-CCNC: 7 U/L (ref 7–52)
ANION GAP SERPL CALCULATED.3IONS-SCNC: 10 MMOL/L (ref 4–13)
AST SERPL W P-5'-P-CCNC: 7 U/L (ref 13–39)
BASOPHILS # BLD AUTO: 0 THOUSANDS/ΜL (ref 0–0.1)
BASOPHILS NFR BLD AUTO: 1 % (ref 0–2)
BILIRUB SERPL-MCNC: 0.7 MG/DL (ref 0.2–1)
BUN SERPL-MCNC: 38 MG/DL (ref 7–25)
CALCIUM SERPL-MCNC: 8.6 MG/DL (ref 8.6–10.5)
CHLORIDE SERPL-SCNC: 109 MMOL/L (ref 98–107)
CO2 SERPL-SCNC: 20 MMOL/L (ref 21–31)
CREAT SERPL-MCNC: 2.26 MG/DL (ref 0.7–1.3)
EOSINOPHIL # BLD AUTO: 0.1 THOUSAND/ΜL (ref 0–0.61)
EOSINOPHIL NFR BLD AUTO: 2 % (ref 0–5)
ERYTHROCYTE [DISTWIDTH] IN BLOOD BY AUTOMATED COUNT: 18.5 % (ref 11.5–14.5)
GFR SERPL CREATININE-BSD FRML MDRD: 26 ML/MIN/1.73SQ M
GLUCOSE SERPL-MCNC: 184 MG/DL (ref 65–99)
GLUCOSE SERPL-MCNC: 189 MG/DL (ref 65–140)
GLUCOSE SERPL-MCNC: 218 MG/DL (ref 65–140)
GLUCOSE SERPL-MCNC: 228 MG/DL (ref 65–140)
GLUCOSE SERPL-MCNC: 252 MG/DL (ref 65–140)
HCT VFR BLD AUTO: 24.5 % (ref 42–47)
HGB BLD-MCNC: 8.1 G/DL (ref 14–18)
LYMPHOCYTES # BLD AUTO: 0.8 THOUSANDS/ΜL (ref 0.6–4.47)
LYMPHOCYTES NFR BLD AUTO: 14 % (ref 21–51)
MCH RBC QN AUTO: 28.7 PG (ref 26–34)
MCHC RBC AUTO-ENTMCNC: 32.8 G/DL (ref 31–37)
MCV RBC AUTO: 88 FL (ref 81–99)
MONOCYTES # BLD AUTO: 0.8 THOUSAND/ΜL (ref 0.17–1.22)
MONOCYTES NFR BLD AUTO: 13 % (ref 2–12)
NEUTROPHILS # BLD AUTO: 4.4 THOUSANDS/ΜL (ref 1.4–6.5)
NEUTS SEG NFR BLD AUTO: 72 % (ref 42–75)
PLATELET # BLD AUTO: 153 THOUSANDS/UL (ref 149–390)
PMV BLD AUTO: 9.4 FL (ref 8.6–11.7)
POTASSIUM SERPL-SCNC: 3.9 MMOL/L (ref 3.5–5.5)
PROT SERPL-MCNC: 5.8 G/DL (ref 6.4–8.9)
RBC # BLD AUTO: 2.8 MILLION/UL (ref 4.3–5.9)
SODIUM SERPL-SCNC: 139 MMOL/L (ref 134–143)
WBC # BLD AUTO: 6.2 THOUSAND/UL (ref 4.8–10.8)

## 2019-07-03 PROCEDURE — 97110 THERAPEUTIC EXERCISES: CPT

## 2019-07-03 PROCEDURE — 82948 REAGENT STRIP/BLOOD GLUCOSE: CPT

## 2019-07-03 PROCEDURE — 85025 COMPLETE CBC W/AUTO DIFF WBC: CPT | Performed by: PHYSICIAN ASSISTANT

## 2019-07-03 PROCEDURE — 80053 COMPREHEN METABOLIC PANEL: CPT | Performed by: PHYSICIAN ASSISTANT

## 2019-07-03 RX ADMIN — PANTOPRAZOLE SODIUM 40 MG: 40 TABLET, DELAYED RELEASE ORAL at 06:10

## 2019-07-03 RX ADMIN — IRON SUCROSE 200 MG: 20 INJECTION, SOLUTION INTRAVENOUS at 13:37

## 2019-07-03 RX ADMIN — COLCHICINE 0.3 MG: 0.6 TABLET, FILM COATED ORAL at 08:39

## 2019-07-03 RX ADMIN — INSULIN LISPRO 1 UNITS: 100 INJECTION, SOLUTION INTRAVENOUS; SUBCUTANEOUS at 17:48

## 2019-07-03 RX ADMIN — INSULIN LISPRO 3 UNITS: 100 INJECTION, SOLUTION INTRAVENOUS; SUBCUTANEOUS at 22:37

## 2019-07-03 RX ADMIN — INSULIN LISPRO 1 UNITS: 100 INJECTION, SOLUTION INTRAVENOUS; SUBCUTANEOUS at 08:38

## 2019-07-03 RX ADMIN — SODIUM CHLORIDE 125 ML/HR: 9 INJECTION, SOLUTION INTRAVENOUS at 17:50

## 2019-07-03 RX ADMIN — Medication 150 MG: at 08:41

## 2019-07-03 RX ADMIN — AMLODIPINE BESYLATE 10 MG: 5 TABLET ORAL at 08:40

## 2019-07-03 RX ADMIN — INSULIN DETEMIR 10 UNITS: 100 INJECTION, SOLUTION SUBCUTANEOUS at 22:30

## 2019-07-03 RX ADMIN — ASPIRIN 81 MG: 81 TABLET, COATED ORAL at 08:41

## 2019-07-03 RX ADMIN — SODIUM CHLORIDE 125 ML/HR: 9 INJECTION, SOLUTION INTRAVENOUS at 06:48

## 2019-07-03 RX ADMIN — Medication 1 CAPSULE: at 17:48

## 2019-07-03 RX ADMIN — SODIUM CHLORIDE 500 MG: 9 INJECTION, SOLUTION INTRAVENOUS at 12:20

## 2019-07-03 RX ADMIN — INSULIN LISPRO 2 UNITS: 100 INJECTION, SOLUTION INTRAVENOUS; SUBCUTANEOUS at 12:20

## 2019-07-03 RX ADMIN — TAMSULOSIN HYDROCHLORIDE 0.4 MG: 0.4 CAPSULE ORAL at 17:48

## 2019-07-03 RX ADMIN — Medication 150 MG: at 17:48

## 2019-07-03 RX ADMIN — SULFAMETHOXAZOLE AND TRIMETHOPRIM 1 TABLET: 400; 80 TABLET ORAL at 08:38

## 2019-07-03 RX ADMIN — SULFAMETHOXAZOLE AND TRIMETHOPRIM 1 TABLET: 400; 80 TABLET ORAL at 22:00

## 2019-07-03 NOTE — PHYSICAL THERAPY NOTE
07/03/19 0917   Pain Assessment   Pain Assessment 0-10   Pain Score No Pain   Restrictions/Precautions   Weight Bearing Precautions Per Order No   Other Precautions Contact/isolation; Fall Risk   General   Chart Reviewed Yes   Family/Caregiver Present Yes  (SO)   Cognition   Overall Cognitive Status WFL   Arousal/Participation Alert; Cooperative   Attention Within functional limits   Orientation Level Oriented to person;Oriented to place;Oriented to time   Memory Within functional limits   Following Commands Follows one step commands with increased time or repetition   Comments pt agreed to PT session-LE ex in bed, deferred transfers due to fatigue   Subjective   Subjective "I'm really worn out but I'll do the leg ex in bed "   Transfers   Additional Comments pt deferred transfers OOB   Endurance Deficit   Endurance Deficit Yes   Endurance Deficit Description fatigued even prior to ex   Activity Tolerance   Activity Tolerance Patient limited by fatigue   Exercises   Quad Sets Supine;20 reps;AROM; Bilateral   Heelslides Supine;20 reps;AAROM; Bilateral   Hip Abduction Supine;20 reps;AROM; Bilateral   Ankle Pumps Supine;20 reps;AROM; Bilateral   Assessment   Prognosis Fair   Problem List Decreased strength;Decreased endurance; Impaired balance;Decreased mobility; Decreased coordination;Decreased safety awareness   Assessment Pt seen for PT treatment session this date with interventions consisting of Therapeutic exercise consisting of: AROM and AAROM 20 reps B LE in supine position  Pt agreeable to PT treatment session upon arrival, pt found supine in bed w/ HOB elevated, in no apparent distress  In comparison to previous session, pt with improvements in ex tolerance  Post session: all needs in reach, SCDs active & SO present  Continue to recommend STR at time of d/c in order to maximize pt's functional independence and safety w/ mobility   Pt continues to be functioning below baseline level, and remains limited 2* factors listed above and including decreased strength, endurance & safe functional mobility  PT will continue to see pt while here in order to address the deficits listed above and provide interventions consistent w/ POC in effort to achieve STGs  Barriers to Discharge Inaccessible home environment   Goals   Patient Goals to get some sleep   Treatment Day 1   Plan   Treatment/Interventions Functional transfer training;LE strengthening/ROM; Therapeutic exercise; Endurance training;Patient/family training;Equipment eval/education; Bed mobility;Gait training   Progress Slow progress, decreased activity tolerance   PT Frequency   (3-5 x week)   Recommendation   Recommendation Short-term skilled PT   Equipment Recommended Walker  (pt has own)   PT - OK to Discharge Yes  (when med cleared to STR)   Jamie Kirby, PTA

## 2019-07-03 NOTE — PLAN OF CARE
Problem: PHYSICAL THERAPY ADULT  Goal: Performs mobility at highest level of function for planned discharge setting  See evaluation for individualized goals  Description  Treatment/Interventions: Functional transfer training, LE strengthening/ROM, Therapeutic exercise, Endurance training, Bed mobility, Gait training, Equipment eval/education, Spoke to case management, OT(& neuro re-education w/balance training)  Equipment Recommended: Walker(pt  has own)    See flowsheet documentation for full assessment, interventions and recommendations  Erika Pedro, PT     Outcome: Progressing  Note:   Prognosis: Fair  Problem List: Decreased strength, Decreased endurance, Impaired balance, Decreased mobility, Decreased coordination, Decreased safety awareness  Assessment: Pt seen for PT treatment session this date with interventions consisting of Therapeutic exercise consisting of: AROM and AAROM 20 reps B LE in supine position  Pt agreeable to PT treatment session upon arrival, pt found supine in bed w/ HOB elevated, in no apparent distress  In comparison to previous session, pt with improvements in ex tolerance  Post session: all needs in reach, SCDs active & SO present  Continue to recommend STR at time of d/c in order to maximize pt's functional independence and safety w/ mobility  Pt continues to be functioning below baseline level, and remains limited 2* factors listed above and including decreased strength, endurance & safe functional mobility  PT will continue to see pt while here in order to address the deficits listed above and provide interventions consistent w/ POC in effort to achieve STGs  Barriers to Discharge: Inaccessible home environment     Recommendation: Short-term skilled PT     PT - OK to Discharge: Yes(when med cleared to STR)    See flowsheet documentation for full assessment

## 2019-07-03 NOTE — PLAN OF CARE
Problem: Potential for Falls  Goal: Patient will remain free of falls  Description  INTERVENTIONS:  - Assess patient frequently for physical needs  -  Identify cognitive and physical deficits and behaviors that affect risk of falls    -  [place] fall precautions as indicated by assessment   - Educate patient/family on patient safety including physical limitations  - Instruct patient to call for assistance with activity based on assessment  - Modify environment to reduce risk of injury  - Consider OT/PT consult to assist with strengthening/mobility  Outcome: Progressing     Problem: Prexisting or High Potential for Compromised Skin Integrity  Goal: Skin integrity is maintained or improved  Description  INTERVENTIONS:  - Identify patients at risk for skin breakdown  - Assess and monitor skin integrity  - Assess and monitor nutrition and hydration status  - Monitor labs (i e  albumin)  - Assess for incontinence   - Turn and reposition patient  - Assist with mobility/ambulation  - Relieve pressure over bony prominences  - Avoid friction and shearing  - Provide appropriate hygiene as needed including keeping skin clean and dry  - Evaluate need for skin moisturizer/barrier cream  - Collaborate with interdisciplinary team (i e  Nutrition, Rehabilitation, etc )   - Patient/family teaching  Outcome: Progressing     Problem: PAIN - ADULT  Goal: Verbalizes/displays adequate comfort level or baseline comfort level  Description  Interventions:  - Encourage patient to monitor pain and request assistance  - Assess pain using appropriate pain scale  - Administer analgesics based on type and severity of pain and evaluate response  - Implement non-pharmacological measures as appropriate and evaluate response  - Consider cultural and social influences on pain and pain management  - Notify physician/advanced practitioner if interventions unsuccessful or patient reports new pain  Outcome: Progressing     Problem: INFECTION - ADULT  Goal: Absence or prevention of progression during hospitalization  Description  INTERVENTIONS:  - Assess and monitor for signs and symptoms of infection  - Monitor lab/diagnostic results  - Monitor all insertion sites, i e  indwelling lines, tubes, and drains  - Monitor endotracheal (as able) and nasal secretions for changes in amount and color  - [place] appropriate cooling/warming therapies per order  - Administer medications as ordered  - Instruct and encourage patient and family to use good hand hygiene technique  - Identify and instruct in appropriate isolation precautions for identified infection/condition  Outcome: Progressing     Problem: SAFETY ADULT  Goal: Maintain or return to baseline ADL function  Description  INTERVENTIONS:  -  Assess patient's ability to carry out ADLs; assess patient's baseline for ADL function and identify physical deficits which impact ability to perform ADLs (bathing, care of mouth/teeth, toileting, grooming, dressing, etc )  - Assess/evaluate cause of self-care deficits   - Assess range of motion  - Assess patient's mobility; develop plan if impaired  - Assess patient's need for assistive devices and provide as appropriate  - Encourage maximum independence but intervene and supervise when necessary  ¯ Involve family in performance of ADLs  ¯ Assess for home care needs following discharge   ¯ Request OT consult to assist with ADL evaluation and planning for discharge  ¯ Provide patient education as appropriate  Outcome: Progressing  Goal: Maintain or return mobility status to optimal level  Description  INTERVENTIONS:  - Assess patient's baseline mobility status (ambulation, transfers, stairs, etc )    - Identify cognitive and physical deficits and behaviors that affect mobility  - Identify mobility aids required to assist with transfers and/or ambulation (gait belt, sit-to-stand, lift, walker, cane, etc )  - [place] fall precautions as indicated by assessment  - Record patient progress and toleration of activity level on Mobility SBAR; progress patient to next Phase/Stage  - Instruct patient to call for assistance with activity based on assessment  - Request Rehabilitation consult to assist with strengthening/weightbearing, etc   Outcome: Progressing     Problem: DISCHARGE PLANNING  Goal: Discharge to home or other facility with appropriate resources  Description  INTERVENTIONS:  - Identify barriers to discharge w/patient and caregiver  - Arrange for needed discharge resources and transportation as appropriate  - Identify discharge learning needs (meds, wound care, etc )  - Arrange for interpretive services to assist at discharge as needed  - Refer to Case Management Department for coordinating discharge planning if the patient needs post-hospital services based on physician/advanced practitioner order or complex needs related to functional status, cognitive ability, or social support system  Outcome: Progressing     Problem: Knowledge Deficit  Goal: Patient/family/caregiver demonstrates understanding of disease process, treatment plan, medications, and discharge instructions  Description  Complete learning assessment and assess knowledge base    Interventions:  - Provide teaching at level of understanding  - Provide teaching via preferred learning methods  Outcome: Progressing     Problem: DISCHARGE PLANNING - CARE MANAGEMENT  Goal: Discharge to post-acute care or home with appropriate resources  Description  INTERVENTIONS:  - Conduct assessment to determine patient/family and health care team treatment goals, and need for post-acute services based on payer coverage, community resources, and patient preferences, and barriers to discharge  - Address psychosocial, clinical, and financial barriers to discharge as identified in assessment in conjunction with the patient/family and health care team  - Arrange appropriate level of post-acute services according to patient's   needs and preference and payer coverage in collaboration with the physician and health care team  - Communicate with and update the patient/family, physician, and health care team regarding progress on the discharge plan  - Arrange appropriate transportation to post-acute venues  Pt will need PT/OT for further recommendations  Pt was just discharged from [facility]     Outcome: Progressing

## 2019-07-03 NOTE — PROGRESS NOTES
Progress Note - Nephrology   Nimo Kinsey [de-identified] y o  male MRN: [id number]  Unit/Bed#: -01 Encounter: [id number]    A/P:  1  Acute kidney injury: most probably due to underlying infection with ESBL E  Coli  Continue IV abx and obtain serial studies  2  Proteinuria: has underlying kidney disease with diabetic nephropathy  3  Possible neuroendocrine tumor: had elevated 5-HIAA in the urine in Feb of this year  4  Obstructive uropathy: had garcia drainage issues which were corrected  5  Hypertension: stable      Follow up reason for today's visit: Acute on chronic kidney disease    Urinary tract infection due to extended-spectrum beta lactamase (ESBL) producing Escherichia coli    Patient Active Problem List   Diagnosis    Weakness generalized    Type 2 diabetes mellitus with complication, with long-term current use of insulin ([address] )    Essential hypertension    Mixed hyperlipidemia    Cardiac disease    Generalized abdominal mass    Hydroureter    Anemia    Syncope and collapse    Accelerated hypertension    Liver mass    Neuroendocrine tumor    Acute cystitis without hematuria    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)    Acute kidney injury superimposed on chronic kidney disease (HCC)    Acute pain of left knee    Pressure injury of right heel, unstageable ([address] )    Atherosclerosis of artery of extremity with ulceration (HCC)    Carcinoid syndrome (HCC)    Sepsis (HCC)    Gram-negative bacteremia    Left hip pain    Acute cystitis with hematuria    Chronic indwelling Garcia catheter    Moderate protein-calorie malnutrition (HCC)    Biliary sludge    Urinary tract infection due to extended-spectrum beta lactamase (ESBL) producing Escherichia coli         Subjective:   Denies chest pain, dyspnea, abdominal pain  Garcia draining very well   Ate all of his breakfast    Objective:     Vitals: Blood pressure 115/53, pulse 70, temperature 98 2 °F (36 8 °C), temperature source Tympanic, resp  rate 18, height 5' 8" (1 727 m), weight 69 9 kg (154 lb 1 oz), SpO2 97 %  ,Body mass index is 23 43 kg/m²  Weight (last 2 days)     None            Intake/Output Summary (Last 24 hours) at 7/3/2019 1009  Last data filed at 7/3/2019 0700  Gross per 24 hour   Intake 4492 92 ml   Output 1500 ml   Net 2992 92 ml     I/O last 3 completed shifts: In: 5732 9 [P O :960; I V :4772 9]  Out: 2801 [Urine:2800; Stool:1]    Urethral Catheter Latex 16 Fr   (Active)   Site Assessment Clean;Skin intact 7/2/2019  9:00 PM   Collection Container Standard drainage bag 7/2/2019  9:00 PM   Securement Method Securing device (Describe) 7/2/2019  9:00 PM   Output (mL) 800 mL 7/2/2019  9:00 PM       Physical Exam: /53 (BP Location: Right arm)   Pulse 70   Temp 98 2 °F (36 8 °C) (Tympanic)   Resp 18   Ht 5' 8" (1 727 m) Comment: Stated  Wt 69 9 kg (154 lb 1 oz) Comment: Weigh bed  SpO2 97%   BMI 23 43 kg/m²     General Appearance:    Alert, cooperative, no distress, appears stated age   Head:    Normocephalic, without obvious abnormality, atraumatic   Eyes:    Conjunctiva/corneas clear   Ears:    Normal external ears   Nose:   Nares normal, septum midline, mucosa normal, no drainage    or sinus tenderness   Throat:   Lips, mucosa, and tongue normal; teeth and gums normal   Neck:   Supple, symmetrical, trachea midline, no adenopathy;        thyroid:  No enlargement/tenderness/nodules; no carotid    bruit or JVD   Back:     Symmetric, no curvature, ROM normal, no CVA tenderness   Lungs:     Clear to auscultation bilaterally, respirations unlabored   Chest wall:    No tenderness or deformity   Heart:    Regular rate and rhythm, S1 and S2 normal, no murmur, rub   or gallop   Abdomen:     Soft, non-tender, bowel sounds active   Extremities:   Extremities normal, atraumatic, no cyanosis or edema   Skin:   Skin color, texture, turgor normal, no rashes or lesions   Lymph nodes:   Cervical normal   Neurologic:   CNII-XII intact Lab, Imaging and other studies: I have personally reviewed pertinent labs  CBC:   Lab Results   Component Value Date    WBC 6 20 07/03/2019    HGB 8 1 (L) 07/03/2019    HCT 24 5 (L) 07/03/2019    MCV 88 07/03/2019     07/03/2019    MCH 28 7 07/03/2019    MCHC 32 8 07/03/2019    RDW 18 5 (H) 07/03/2019    MPV 9 4 07/03/2019     CMP:   Lab Results   Component Value Date    K 3 9 07/03/2019     (H) 07/03/2019    CO2 20 (L) 07/03/2019    BUN 38 (H) 07/03/2019    CREATININE 2 26 (H) 07/03/2019    CALCIUM 8 6 07/03/2019    AST 7 (L) 07/03/2019    ALT 7 07/03/2019    ALKPHOS 80 07/03/2019    EGFR 26 07/03/2019         Results from last 7 days   Lab Units 07/03/19  0609 07/02/19  0532 07/01/19  0459   POTASSIUM mmol/L 3 9 3 7 3 2*   CHLORIDE mmol/L 109* 110* 106   CO2 mmol/L 20* 20* 18*   BUN mg/dL 38* 39* 41*   CREATININE mg/dL 2 26* 2 28* 2 43*   CALCIUM mg/dL 8 6 8 5* 8 7   ALK PHOS U/L 80 71 76   ALT U/L 7 6* 6*   AST U/L 7* 6* 6*         Phosphorus: No results found for: PHOS  Magnesium: No results found for: MG  Urinalysis: No results found for: COLORU, CLARITYU, SPECGRAV, PHUR, LEUKOCYTESUR, NITRITE, PROTEINUA, GLUCOSEU, KETONESU, BILIRUBINUR, BLOODU  Ionized Calcium: No results found for: CAION  Coagulation: No results found for: PT, INR, APTT  Troponin: No results found for: TROPONINI  ABG: No results found for: PHART, BQH7QEO, PO2ART, DCH9DTW, O0KMYPAE, BEART, SOURCE  Radiology review:     IMAGING  Procedure: Xr Chest 1 View Portable    Result Date: 7/1/2019  Narrative: CHEST INDICATION:   WEAKNESS  COMPARISON:  6/3/2019  EXAM PERFORMED/VIEWS:  XR CHEST PORTABLE FINDINGS: Cardiomediastinal silhouette appears unremarkable  The lungs are clear  No pneumothorax or pleural effusion  Osseous structures appear within normal limits for patient age  Impression: No acute cardiopulmonary disease   Workstation performed: [id number]     Procedure: Us Kidney And Bladder    Result Date: 7/1/2019  Narrative: RENAL ULTRASOUND INDICATION:   RENAL FAILURE, ACUTE URINARY TRACT INFECTION ,  COMPARISON: 6/5/2019; 6/4/2019 TECHNIQUE:   Ultrasound of the retroperitoneum was performed with a curvilinear transducer utilizing volumetric sweeps and still imaging techniques  FINDINGS: KIDNEYS: Symmetric and normal size  Right kidney:  12 9 cm  Left kidney:  12 4 cm  Right kidney Normal echogenicity and contour  No suspicious masses detected  No hydronephrosis  No shadowing calculi  No perinephric fluid collections  Left kidney Normal echogenicity and contour  No suspicious masses detected  No hydronephrosis  No shadowing calculi  No perinephric fluid collections  URETERS: Nonvisualized  BLADDER: A garcia catheter is in place, decompressing the bladder and limiting its evaluation  No focal thickening or mass lesions  Bilateral ureteral jets detected  A few echogenic hepatic masses are incompletely imaged, similar to the recent prior ultrasound and CT studies but incompletely characterized  Impression: 1  No hydronephrosis  2   Garcia catheter within the bladder  3   Hepatic mass lesions redemonstrated, incompletely imaged  Please see recent prior ultrasound and CT from a few weeks ago   Workstation performed: [id number]       Current Facility-Administered Medications   Medication Dose Route Frequency    acetaminophen (TYLENOL) tablet 650 mg  650 mg Oral Q4H PRN    amLODIPine (NORVASC) tablet 10 mg  10 mg Oral Daily    aspirin (ECOTRIN LOW STRENGTH) EC tablet 81 mg  81 mg Oral Daily    b complex-vitamin C-folic acid (NEPHROCAPS) capsule 1 capsule  1 capsule Oral Daily With Dinner    colchicine (COLCRYS) tablet 0 3 mg  0 3 mg Oral Daily    ergocalciferol (VITAMIN D2) capsule 50,000 Units  50,000 Units Oral Weekly    ertapenem (INVanz) 500 mg in sodium chloride 0 9 % 50 mL IVPB  500 mg Intravenous Q24H    glucose chewable tablet 16 g  16 g Oral Once PRN    insulin detemir (LEVEMIR) subcutaneous injection 10 Units  10 Units Subcutaneous HS    insulin lispro (HumaLOG) 100 units/mL subcutaneous injection 1-6 Units  1-6 Units Subcutaneous 4x Daily (PC & HS)    iron polysaccharides (NIFEREX) capsule 150 mg  150 mg Oral BID    ondansetron (ZOFRAN) injection 4 mg  4 mg Intravenous Q6H PRN    pantoprazole (PROTONIX) EC tablet 40 mg  40 mg Oral Early Morning    simethicone (MYLICON) chewable tablet 80 mg  80 mg Oral Q6H PRN    sodium chloride 0 9 % infusion  125 mL/hr Intravenous Continuous    sulfamethoxazole-trimethoprim (BACTRIM) 400-80 mg per tablet 1 tablet  1 tablet Oral Q12H [address]    tamsulosin (FLOMAX) capsule 0 4 mg  0 4 mg Oral Daily With Dinner     Medications Discontinued During This Encounter   Medication Reason    insulin lispro (HumaLOG) 100 units/mL subcutaneous injection 1-6 Units     insulin lispro (HumaLOG) 100 units/mL subcutaneous injection 1-6 Units     ondansetron (ZOFRAN-ODT) dispersible tablet 4 mg     Cholecalciferol TABS 50,000 Units     potassium chloride 10 % oral solution 20 mEq     ferrous sulfate tablet 325 mg        Ketan Lewis MD

## 2019-07-03 NOTE — CONSULTS
Consultation - Urology   Letty Blackman [de-identified] y o  male MRN: [id number]  Unit/Bed#: -01 Encounter: [id number]      Assessment/Plan      Assessment:  Urinary tract infection with fever secondary to malpositioned Haile catheter with bacterial colonization of the chronic Haile  No sign of upper tract obstruction, epididymitis or prostatitis  Plan:  Continue current Haile catheter  Continue course of intravenous antibiotics followed by outpatient oral antibiotics  I will follow up with him as an outpatient for future Haile catheter changes  History of Present Illness   Attending: Petar Greco MD  Reason for Consult / Principal Problem:  Urinary tract infection  HPI: Letty Blackman is a [de-identified]y o  year old male who presents with fever and weakness several days after attempted Haile catheter change as an outpatient by the visiting nurse  He was recently discharged from the [place] [address] over a week ago and the next day had his Haile catheter changed by the visiting nurse  About 3 days later, the catheter was not draining well and he went to the [facility] where the catheter was changed with 700 cc of residual   He then had a fever and was admitted  He was started on ceftriaxone  He currently is on Bactrim  The Haile catheter has since been draining well and his fever curve has decreased  He also has chronic renal insufficiency with baseline about 1 8  However, current creatinine is about 2 3  Ultrasound showed normal kidneys with no hydronephrosis  Consults    Review of Systems   Genitourinary: Negative for dysuria, flank pain, hematuria and testicular pain         Historical Information   Past Medical History:   Diagnosis Date    BPH (benign prostatic hyperplasia)     Cancer [facility] )     liver cancer    Cardiac disease     Coronary artery disease     Diabetes mellitus ([facility] )     DJD (degenerative joint disease)     Gait disturbance     Generalized weakness     GERD (gastroesophageal reflux disease)     Gout     Hyperlipidemia     Hypertension     Renal disorder      Past Surgical History:   Procedure Laterality Date    CORONARY ANGIOPLASTY WITH STENT PLACEMENT      IR IMAGE GUIDED BIOPSY/ASPIRATION LIVER  1/24/2019     Social History   Social History     Substance and Sexual Activity   Alcohol Use Never    Frequency: Never     Social History     Substance and Sexual Activity   Drug Use Never     Social History     Tobacco Use   Smoking Status Never Smoker   Smokeless Tobacco Never Used     Family History: non-contributory    Meds/Allergies   current meds:   Current Facility-Administered Medications   Medication Dose Route Frequency    acetaminophen (TYLENOL) tablet 650 mg  650 mg Oral Q4H PRN    amLODIPine (NORVASC) tablet 10 mg  10 mg Oral Daily    aspirin (ECOTRIN LOW STRENGTH) EC tablet 81 mg  81 mg Oral Daily    b complex-vitamin C-folic acid (NEPHROCAPS) capsule 1 capsule  1 capsule Oral Daily With Dinner    colchicine (COLCRYS) tablet 0 3 mg  0 3 mg Oral Daily    ergocalciferol (VITAMIN D2) capsule 50,000 Units  50,000 Units Oral Weekly    ertapenem (INVanz) 500 mg in sodium chloride 0 9 % 50 mL IVPB  500 mg Intravenous Q24H    glucose chewable tablet 16 g  16 g Oral Once PRN    insulin detemir (LEVEMIR) subcutaneous injection 10 Units  10 Units Subcutaneous HS    insulin lispro (HumaLOG) 100 units/mL subcutaneous injection 1-6 Units  1-6 Units Subcutaneous 4x Daily (PC & HS)    iron polysaccharides (NIFEREX) capsule 150 mg  150 mg Oral BID    ondansetron (ZOFRAN) injection 4 mg  4 mg Intravenous Q6H PRN    pantoprazole (PROTONIX) EC tablet 40 mg  40 mg Oral Early Morning    simethicone (MYLICON) chewable tablet 80 mg  80 mg Oral Q6H PRN    sodium chloride 0 9 % infusion  125 mL/hr Intravenous Continuous    sulfamethoxazole-trimethoprim (BACTRIM) 400-80 mg per tablet 1 tablet  1 tablet Oral Q12H [facility]    tamsulosin (FLOMAX) capsule 0 4 mg  0 4 mg Oral Daily With [employer] No Known Allergies    Objective   Vitals: Blood pressure 115/53, pulse 70, temperature 98 2 °F (36 8 °C), temperature source Tympanic, resp  rate 18, height 5' 8" (1 727 m), weight 69 9 kg (154 lb 1 oz), SpO2 97 %  I/O last 24 hours: In: 4732 9 [P O :960; I V :3772 9]  Out: 1500 [Urine:1500]    Invasive Devices     Peripheral Intravenous Line            Peripheral IV 07/02/19 Left;Ventral (anterior) Forearm less than 1 day          Drain            Urethral Catheter Latex 16 Fr  4 days                Physical Exam   Abdominal: Soft  He exhibits no distension  There is no tenderness  Genitourinary: Penis normal     With a subcoronal hypospadias  Haile catheter in place draining clear yellow urine  Testicles and spermatic cords normal bilaterally with no swelling or tenderness  Prostate moderately enlarged, firm, smooth, nontender without masses  Lab Results:   CBC:   Lab Results   Component Value Date    WBC 6 20 07/03/2019    HGB 8 1 (L) 07/03/2019    HCT 24 5 (L) 07/03/2019    MCV 88 07/03/2019     07/03/2019    MCH 28 7 07/03/2019    MCHC 32 8 07/03/2019    RDW 18 5 (H) 07/03/2019    MPV 9 4 07/03/2019     CMP:   Lab Results   Component Value Date    SODIUM 139 07/03/2019     (H) 07/03/2019    CO2 20 (L) 07/03/2019    BUN 38 (H) 07/03/2019    CREATININE 2 26 (H) 07/03/2019    CALCIUM 8 6 07/03/2019    AST 7 (L) 07/03/2019    ALT 7 07/03/2019    ALKPHOS 80 07/03/2019    EGFR 26 07/03/2019     Urinalysis: No results found for: Bryant Saber, SPECGRAV, PHUR, LEUKOCYTESUR, NITRITE, PROTEINUA, GLUCOSEU, KETONESU, BILIRUBINUR, BLOODU  Urine Culture: No results found for: URINECX  Imaging Studies: I have personally reviewed pertinent reports  EKG, Pathology, and Other Studies: I have personally reviewed pertinent reports      VTE Prophylaxis: Sequential compression device [facility])     Code Status: Level 1 - Full Code  Advance Directive and Living Will:      Power of :    POLST: Counseling / Coordination of Care  Total floor / unit time spent today 30 minutes  Greater than 50% of total time was spent with the patient and / or family counseling and / or coordination of care  A description of the counseling / coordination of care:  I have discussed the case with the physician assistant

## 2019-07-03 NOTE — SOCIAL WORK
PT and SO states they continue to want to go home with [address]  Confirmed the pt has been accepted by [facility] [place]  SO reports she can transport him home

## 2019-07-03 NOTE — PROGRESS NOTES
Beltran Scanlon#  [id number] Renée Frank  WDF:[id number]    TVO:[id number]  Adm Date: 6/30/2019 1855  6:55 PM   ATT PHY: Jd Perez, [facility]         Subjective     The patient was seen after reviewing the chart and discussing the case with caring staff  Today during our encounter, the patient reported continued unchanged weakness  He otherwise denies any acute concerns  On review of patient's ultrasound which was done yesterday it shows no hydronephrosis, continues to show hepatic mass lesions which were previously known  Patient denies any scrotal pain  Dr Cj Salazar has seen the patient today and had is in agreement with the current treatment  Dr Cj Salazar will follow up the patient on outpatient basis  Patient was also seen by Dr Ida Mendoza today and she thinks that patient has SILVERIO secondary to underlying infection  Patient's CBC done from today was reviewed which showed slightly reduced hemoglobin 8 1 from 8 3 yesterday  There is no apparent evidence of bleeding from stool or urine  Objective     Vitals:    07/03/19 0657   BP: 115/53   Pulse: 70   Resp: 18   Temp: 98 2 °F (36 8 °C)   SpO2: 97%       General Appearance: Awake and Alert  No acute distress  HEENT: Normocephalic, atraumatic  PERRLA, EOMI, MMM  Heart: RRR, no murmurs  Normal S1 and S2   Lungs: CTA bilaterally with fair air entry  Assessment     Normie Pickup is a(n) [de-identified]y o  year old male with acute ESBL positive UTI and possible urosepsis     1  ESBL positive UTI with possible urosepsis  Sepsis diagnosis is possible as evidenced by elevated WBC count and fever, but there was no evidence of hypotension or tachycardia  Patient now has low grade fever and resolution in WBC count  Continue Ertapenem 500mg every 24 hours for 8 more days (renally adjusted)  2  Epididymitis  I will add PO Bactrim SS every 12 hours Day # 2/10 for this for additional coverage   Bactrim is also a sensitive antibiotic for his above UTI, and so can be transitioned to this a single PO option once discharged  3  Cardiac with history of Hypertension and CAD  Amlodipine 10mg once daily  Will hold addition of ACE-I given SILVERIO  I will restart aspirin therapy  4  Type 2 Diabetes Mellitus  Levemir 10 units at bedtime and low dose sliding scale  Accu checks ACHS  Carb Controlled Diet  5  Acute on Chronic Kidney Disease  Improved  [address]  Nephrology is on consult and believes that this may be a result of SILVERIO due to underlying infection  Normal saline 125cc/hr  CMP tomorrow  5  Iron Deficiency Anemia  Anemia is likely complicated by SILVERIO as well  I will change the patient to Niferex 150mg twice per day  I will put the patient on Venofer 200mg IV Daily  X 3 Doses  Will continue to monitor H&H   6  Hypokalemia  Resolved  Potassium will be discontinued  CMP tomorrow  7  Generalized Weakness/Gait Disturbance  PT/OT  8  DJD/OA and gout  Tylenol as needed  Colchicine 0 3mg once daily (renally adjusted)  9  GERD  Protonix  10  Nausea  Zofran as needed  11  Bladder Cancer/Urine Retention/BPH  Flomax  Dr Richard Blanca has seen the patient and has recommended continue Haile's catheter along with IV antibiotics and oral Antibiotic to go home with  He will follow the patient on outpatient basis for future for catheter changes        VTE prophylaxis: Although there is no evidence of acute bleed, given low H/H, Will wait on pharmacologic prophylaxis  Patient may have sequential compression device  Discharge planning: PT recommends [facility] again, but the family is more so interested in home PT along with his already schedule [facility]   is trying to make arrangements for home PT  Will coordinate this with case management

## 2019-07-04 LAB
ABO GROUP BLD: NORMAL
ALBUMIN SERPL BCP-MCNC: 2.8 G/DL (ref 3.5–5.7)
ALP SERPL-CCNC: 120 U/L (ref 55–165)
ALT SERPL W P-5'-P-CCNC: 9 U/L (ref 7–52)
ANION GAP SERPL CALCULATED.3IONS-SCNC: 9 MMOL/L (ref 4–13)
AST SERPL W P-5'-P-CCNC: 10 U/L (ref 13–39)
BASOPHILS # BLD AUTO: 0 THOUSANDS/ΜL (ref 0–0.1)
BASOPHILS NFR BLD AUTO: 1 % (ref 0–2)
BILIRUB SERPL-MCNC: 0.6 MG/DL (ref 0.2–1)
BLD GP AB SCN SERPL QL: NEGATIVE
BUN SERPL-MCNC: 31 MG/DL (ref 7–25)
CALCIUM SERPL-MCNC: 8.3 MG/DL (ref 8.6–10.5)
CHLORIDE SERPL-SCNC: 110 MMOL/L (ref 98–107)
CO2 SERPL-SCNC: 19 MMOL/L (ref 21–31)
CREAT SERPL-MCNC: 1.92 MG/DL (ref 0.7–1.3)
EOSINOPHIL # BLD AUTO: 0.1 THOUSAND/ΜL (ref 0–0.61)
EOSINOPHIL NFR BLD AUTO: 2 % (ref 0–5)
ERYTHROCYTE [DISTWIDTH] IN BLOOD BY AUTOMATED COUNT: 18.4 % (ref 11.5–14.5)
GFR SERPL CREATININE-BSD FRML MDRD: 32 ML/MIN/1.73SQ M
GLUCOSE SERPL-MCNC: 154 MG/DL (ref 65–140)
GLUCOSE SERPL-MCNC: 183 MG/DL (ref 65–140)
GLUCOSE SERPL-MCNC: 220 MG/DL (ref 65–99)
GLUCOSE SERPL-MCNC: 236 MG/DL (ref 65–140)
GLUCOSE SERPL-MCNC: 265 MG/DL (ref 65–140)
HCT VFR BLD AUTO: 22.9 % (ref 42–47)
HGB BLD-MCNC: 7.7 G/DL (ref 14–18)
LYMPHOCYTES # BLD AUTO: 0.7 THOUSANDS/ΜL (ref 0.6–4.47)
LYMPHOCYTES NFR BLD AUTO: 11 % (ref 21–51)
MCH RBC QN AUTO: 29.2 PG (ref 26–34)
MCHC RBC AUTO-ENTMCNC: 33.7 G/DL (ref 31–37)
MCV RBC AUTO: 87 FL (ref 81–99)
MONOCYTES # BLD AUTO: 0.8 THOUSAND/ΜL (ref 0.17–1.22)
MONOCYTES NFR BLD AUTO: 13 % (ref 2–12)
NEUTROPHILS # BLD AUTO: 4.9 THOUSANDS/ΜL (ref 1.4–6.5)
NEUTS SEG NFR BLD AUTO: 74 % (ref 42–75)
PLATELET # BLD AUTO: 157 THOUSANDS/UL (ref 149–390)
PMV BLD AUTO: 8.9 FL (ref 8.6–11.7)
POTASSIUM SERPL-SCNC: 4 MMOL/L (ref 3.5–5.5)
PROT SERPL-MCNC: 5.9 G/DL (ref 6.4–8.9)
RBC # BLD AUTO: 2.64 MILLION/UL (ref 4.3–5.9)
RH BLD: POSITIVE
SODIUM SERPL-SCNC: 138 MMOL/L (ref 134–143)
SPECIMEN EXPIRATION DATE: NORMAL
WBC # BLD AUTO: 6.6 THOUSAND/UL (ref 4.8–10.8)

## 2019-07-04 PROCEDURE — 86850 RBC ANTIBODY SCREEN: CPT | Performed by: PHYSICIAN ASSISTANT

## 2019-07-04 PROCEDURE — 86901 BLOOD TYPING SEROLOGIC RH(D): CPT | Performed by: PHYSICIAN ASSISTANT

## 2019-07-04 PROCEDURE — 85025 COMPLETE CBC W/AUTO DIFF WBC: CPT | Performed by: FAMILY MEDICINE

## 2019-07-04 PROCEDURE — P9016 RBC LEUKOCYTES REDUCED: HCPCS

## 2019-07-04 PROCEDURE — 86920 COMPATIBILITY TEST SPIN: CPT

## 2019-07-04 PROCEDURE — 80053 COMPREHEN METABOLIC PANEL: CPT | Performed by: PHYSICIAN ASSISTANT

## 2019-07-04 PROCEDURE — 82948 REAGENT STRIP/BLOOD GLUCOSE: CPT

## 2019-07-04 PROCEDURE — 30233N1 TRANSFUSION OF NONAUTOLOGOUS RED BLOOD CELLS INTO PERIPHERAL VEIN, PERCUTANEOUS APPROACH: ICD-10-PCS | Performed by: FAMILY MEDICINE

## 2019-07-04 PROCEDURE — 86900 BLOOD TYPING SEROLOGIC ABO: CPT | Performed by: PHYSICIAN ASSISTANT

## 2019-07-04 RX ADMIN — TAMSULOSIN HYDROCHLORIDE 0.4 MG: 0.4 CAPSULE ORAL at 17:29

## 2019-07-04 RX ADMIN — INSULIN LISPRO 3 UNITS: 100 INJECTION, SOLUTION INTRAVENOUS; SUBCUTANEOUS at 17:29

## 2019-07-04 RX ADMIN — SULFAMETHOXAZOLE AND TRIMETHOPRIM 1 TABLET: 400; 80 TABLET ORAL at 09:49

## 2019-07-04 RX ADMIN — Medication 150 MG: at 09:50

## 2019-07-04 RX ADMIN — ASPIRIN 81 MG: 81 TABLET, COATED ORAL at 09:49

## 2019-07-04 RX ADMIN — Medication 1 CAPSULE: at 17:29

## 2019-07-04 RX ADMIN — PANTOPRAZOLE SODIUM 40 MG: 40 TABLET, DELAYED RELEASE ORAL at 05:16

## 2019-07-04 RX ADMIN — INSULIN DETEMIR 10 UNITS: 100 INJECTION, SOLUTION SUBCUTANEOUS at 22:30

## 2019-07-04 RX ADMIN — IRON SUCROSE 200 MG: 20 INJECTION, SOLUTION INTRAVENOUS at 09:49

## 2019-07-04 RX ADMIN — SODIUM CHLORIDE 500 MG: 9 INJECTION, SOLUTION INTRAVENOUS at 13:01

## 2019-07-04 RX ADMIN — SODIUM CHLORIDE 125 ML/HR: 9 INJECTION, SOLUTION INTRAVENOUS at 02:14

## 2019-07-04 RX ADMIN — INSULIN LISPRO 1 UNITS: 100 INJECTION, SOLUTION INTRAVENOUS; SUBCUTANEOUS at 22:30

## 2019-07-04 RX ADMIN — INSULIN LISPRO 1 UNITS: 100 INJECTION, SOLUTION INTRAVENOUS; SUBCUTANEOUS at 09:50

## 2019-07-04 RX ADMIN — SULFAMETHOXAZOLE AND TRIMETHOPRIM 1 TABLET: 400; 80 TABLET ORAL at 22:00

## 2019-07-04 RX ADMIN — SODIUM CHLORIDE 125 ML/HR: 9 INJECTION, SOLUTION INTRAVENOUS at 09:48

## 2019-07-04 RX ADMIN — COLCHICINE 0.3 MG: 0.6 TABLET, FILM COATED ORAL at 09:49

## 2019-07-04 RX ADMIN — AMLODIPINE BESYLATE 10 MG: 5 TABLET ORAL at 09:49

## 2019-07-04 RX ADMIN — INSULIN LISPRO 3 UNITS: 100 INJECTION, SOLUTION INTRAVENOUS; SUBCUTANEOUS at 13:01

## 2019-07-04 NOTE — PROGRESS NOTES
Progress Note - Nephrology   Denzel Kanner [de-identified] y o  male MRN: [id number]  Unit/Bed#: -01 Encounter: [id number]    A/P:  1  Acute kidney injury: most probably due to underlying infection with ESBL E  Coli  Continue IV abx and obtain serial studies  7/4: Little change in function  Continue IV abx and daily monitoring  2  Proteinuria: has underlying kidney disease with diabetic nephropathy  3  Possible neuroendocrine tumor: had elevated 5-HIAA in the urine in Feb of this year  4  Obstructive uropathy: had garcia drainage issues which were corrected  5  Hypertension: stable      Follow up reason for today's visit: Acute on chronic kidney disease    Urinary tract infection due to extended-spectrum beta lactamase (ESBL) producing Escherichia coli    Patient Active Problem List   Diagnosis    Weakness generalized    Type 2 diabetes mellitus with complication, with long-term current use of insulin ([address] )    Essential hypertension    Mixed hyperlipidemia    Cardiac disease    Generalized abdominal mass    Hydroureter    Anemia    Syncope and collapse    Accelerated hypertension    Liver mass    Neuroendocrine tumor    Acute cystitis without hematuria    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)    Acute kidney injury superimposed on chronic kidney disease (HCC)    Acute pain of left knee    Pressure injury of right heel, unstageable ([address] )    Atherosclerosis of artery of extremity with ulceration (HCC)    Carcinoid syndrome (HCC)    Sepsis (HCC)    Gram-negative bacteremia    Left hip pain    Acute cystitis with hematuria    Chronic indwelling Garcia catheter    Moderate protein-calorie malnutrition (HCC)    Biliary sludge    Urinary tract infection due to extended-spectrum beta lactamase (ESBL) producing Escherichia coli         Subjective:   Denies chest pain, dyspnea, abdominal pain  Garcia draining very well   Ate all of his breakfast    Objective:     Vitals: Blood pressure 145/67, pulse 77, temperature 98 °F (36 7 °C), temperature source Tympanic, resp  rate 18, height 5' 8" (1 727 m), weight 69 9 kg (154 lb 1 oz), SpO2 97 %  ,Body mass index is 23 43 kg/m²  Weight (last 2 days)     None            Intake/Output Summary (Last 24 hours) at 7/4/2019 0956  Last data filed at 7/4/2019 0842  Gross per 24 hour   Intake 340 ml   Output 4975 ml   Net -4635 ml     I/O last 3 completed shifts: In: 2000 [I V :2000]  Out: 5975 [Urine:5975]    Urethral Catheter Latex 16 Fr   (Active)   Site Assessment Clean;Skin intact 7/2/2019  9:00 PM   Collection Container Standard drainage bag 7/2/2019  9:00 PM   Securement Method Securing device (Describe) 7/2/2019  9:00 PM   Output (mL) 800 mL 7/2/2019  9:00 PM       Physical Exam: /67 (BP Location: Right arm)   Pulse 77   Temp 98 °F (36 7 °C) (Tympanic)   Resp 18   Ht 5' 8" (1 727 m) Comment: Stated  Wt 69 9 kg (154 lb 1 oz) Comment: Weigh bed  SpO2 97%   BMI 23 43 kg/m²     General Appearance:    Alert, cooperative, no distress, appears stated age   Head:    Normocephalic, without obvious abnormality, atraumatic   Eyes:    Conjunctiva/corneas clear   Ears:    Normal external ears   Nose:   Nares normal, septum midline, mucosa normal, no drainage    or sinus tenderness   Throat:   Lips, mucosa, and tongue normal; teeth and gums normal   Neck:   Supple, symmetrical, trachea midline, no adenopathy;        thyroid:  No enlargement/tenderness/nodules; no carotid    bruit or JVD   Back:     Symmetric, no curvature, ROM normal, no CVA tenderness   Lungs:     Clear to auscultation bilaterally, respirations unlabored   Chest wall:    No tenderness or deformity   Heart:    Regular rate and rhythm, S1 and S2 normal, no murmur, rub   or gallop   Abdomen:     Soft, non-tender, bowel sounds active   Extremities:   Extremities normal, atraumatic, no cyanosis or edema   Skin:   Skin color, texture, turgor normal, no rashes or lesions   Lymph nodes:   Cervical normal   Neurologic:   CNII-XII intact            Lab, Imaging and other studies: I have personally reviewed pertinent labs  CBC:   Lab Results   Component Value Date    WBC 6 60 07/04/2019    HGB 7 7 (L) 07/04/2019    HCT 22 9 (L) 07/04/2019    MCV 87 07/04/2019     07/04/2019    MCH 29 2 07/04/2019    MCHC 33 7 07/04/2019    RDW 18 4 (H) 07/04/2019    MPV 8 9 07/04/2019     CMP:   No results found for: NA, K, CL, CO2, ANIONGAP, BUN, CREATININE, GLUCOSE, CALCIUM, AST, ALT, ALKPHOS, PROT, BILITOT, EGFR      Results from last 7 days   Lab Units 07/03/19  0609 07/02/19  0532 07/01/19  0459   POTASSIUM mmol/L 3 9 3 7 3 2*   CHLORIDE mmol/L 109* 110* 106   CO2 mmol/L 20* 20* 18*   BUN mg/dL 38* 39* 41*   CREATININE mg/dL 2 26* 2 28* 2 43*   CALCIUM mg/dL 8 6 8 5* 8 7   ALK PHOS U/L 80 71 76   ALT U/L 7 6* 6*   AST U/L 7* 6* 6*         Phosphorus: No results found for: PHOS  Magnesium: No results found for: MG  Urinalysis: No results found for: COLORU, CLARITYU, SPECGRAV, PHUR, LEUKOCYTESUR, NITRITE, PROTEINUA, GLUCOSEU, KETONESU, BILIRUBINUR, BLOODU  Ionized Calcium: No results found for: CAION  Coagulation: No results found for: PT, INR, APTT  Troponin: No results found for: TROPONINI  ABG: No results found for: PHART, EGT6NHK, PO2ART, WDV0MSD, E4PHDWYZ, BEART, SOURCE  Radiology review:     IMAGING  Procedure: Xr Chest 1 View Portable    Result Date: 7/1/2019  Narrative: CHEST INDICATION:   WEAKNESS  COMPARISON:  6/3/2019  EXAM PERFORMED/VIEWS:  XR CHEST PORTABLE FINDINGS: Cardiomediastinal silhouette appears unremarkable  The lungs are clear  No pneumothorax or pleural effusion  Osseous structures appear within normal limits for patient age  Impression: No acute cardiopulmonary disease   Workstation performed: [id number]     Procedure: Us Kidney And Bladder    Result Date: 7/1/2019  Narrative: RENAL ULTRASOUND INDICATION:   RENAL FAILURE, ACUTE URINARY TRACT INFECTION ,  COMPARISON: 6/5/2019; 6/4/2019 TECHNIQUE:   Ultrasound of the retroperitoneum was performed with a curvilinear transducer utilizing volumetric sweeps and still imaging techniques  FINDINGS: KIDNEYS: Symmetric and normal size  Right kidney:  12 9 cm  Left kidney:  12 4 cm  Right kidney Normal echogenicity and contour  No suspicious masses detected  No hydronephrosis  No shadowing calculi  No perinephric fluid collections  Left kidney Normal echogenicity and contour  No suspicious masses detected  No hydronephrosis  No shadowing calculi  No perinephric fluid collections  URETERS: Nonvisualized  BLADDER: A garcia catheter is in place, decompressing the bladder and limiting its evaluation  No focal thickening or mass lesions  Bilateral ureteral jets detected  A few echogenic hepatic masses are incompletely imaged, similar to the recent prior ultrasound and CT studies but incompletely characterized  Impression: 1  No hydronephrosis  2   Garcia catheter within the bladder  3   Hepatic mass lesions redemonstrated, incompletely imaged  Please see recent prior ultrasound and CT from a few weeks ago   Workstation performed: [id number]       Current Facility-Administered Medications   Medication Dose Route Frequency    acetaminophen (TYLENOL) tablet 650 mg  650 mg Oral Q4H PRN    amLODIPine (NORVASC) tablet 10 mg  10 mg Oral Daily    aspirin (ECOTRIN LOW STRENGTH) EC tablet 81 mg  81 mg Oral Daily    b complex-vitamin C-folic acid (NEPHROCAPS) capsule 1 capsule  1 capsule Oral Daily With Dinner    colchicine (COLCRYS) tablet 0 3 mg  0 3 mg Oral Daily    ergocalciferol (VITAMIN D2) capsule 50,000 Units  50,000 Units Oral Weekly    ertapenem (INVanz) 500 mg in sodium chloride 0 9 % 50 mL IVPB  500 mg Intravenous Q24H    glucose chewable tablet 16 g  16 g Oral Once PRN    insulin detemir (LEVEMIR) subcutaneous injection 10 Units  10 Units Subcutaneous HS    insulin lispro (HumaLOG) 100 units/mL subcutaneous injection 1-6 Units  1-6 Units Subcutaneous 4x Daily (PC & HS)    iron polysaccharides (NIFEREX) capsule 150 mg  150 mg Oral BID    iron sucrose (VENOFER) 200 mg in sodium chloride 0 9 % 100 mL IVPB  200 mg Intravenous Daily    ondansetron (ZOFRAN) injection 4 mg  4 mg Intravenous Q6H PRN    pantoprazole (PROTONIX) EC tablet 40 mg  40 mg Oral Early Morning    simethicone (MYLICON) chewable tablet 80 mg  80 mg Oral Q6H PRN    sodium chloride 0 9 % infusion  125 mL/hr Intravenous Continuous    sulfamethoxazole-trimethoprim (BACTRIM) 400-80 mg per tablet 1 tablet  1 tablet Oral Q12H [facility]    tamsulosin (FLOMAX) capsule 0 4 mg  0 4 mg Oral Daily With Dinner     Medications Discontinued During This Encounter   Medication Reason    insulin lispro (HumaLOG) 100 units/mL subcutaneous injection 1-6 Units     insulin lispro (HumaLOG) 100 units/mL subcutaneous injection 1-6 Units     ondansetron (ZOFRAN-ODT) dispersible tablet 4 mg     Cholecalciferol TABS 50,000 Units     potassium chloride 10 % oral solution 20 mEq     ferrous sulfate tablet 325 mg        Kimberli Encarnacion MD

## 2019-07-04 NOTE — PLAN OF CARE
Problem: Potential for Falls  Goal: Patient will remain free of falls  Description  INTERVENTIONS:  - Assess patient frequently for physical needs  -  Identify cognitive and physical deficits and behaviors that affect risk of falls    -  Sharon fall precautions as indicated by assessment   - Educate patient/family on patient safety including physical limitations  - Instruct patient to call for assistance with activity based on assessment  - Modify environment to reduce risk of injury  - Consider OT/PT consult to assist with strengthening/mobility  Outcome: Progressing     Problem: Prexisting or High Potential for Compromised Skin Integrity  Goal: Skin integrity is maintained or improved  Description  INTERVENTIONS:  - Identify patients at risk for skin breakdown  - Assess and monitor skin integrity  - Assess and monitor nutrition and hydration status  - Monitor labs (i e  albumin)  - Assess for incontinence   - Turn and reposition patient  - Assist with mobility/ambulation  - Relieve pressure over bony prominences  - Avoid friction and shearing  - Provide appropriate hygiene as needed including keeping skin clean and dry  - Evaluate need for skin moisturizer/barrier cream  - Collaborate with interdisciplinary team (i e  Nutrition, Rehabilitation, etc )   - Patient/family teaching  Outcome: Progressing     Problem: PAIN - ADULT  Goal: Verbalizes/displays adequate comfort level or baseline comfort level  Description  Interventions:  - Encourage patient to monitor pain and request assistance  - Assess pain using appropriate pain scale  - Administer analgesics based on type and severity of pain and evaluate response  - Implement non-pharmacological measures as appropriate and evaluate response  - Consider cultural and social influences on pain and pain management  - Notify physician/advanced practitioner if interventions unsuccessful or patient reports new pain  Outcome: Progressing     Problem: INFECTION - ADULT  Goal: Absence or prevention of progression during hospitalization  Description  INTERVENTIONS:  - Assess and monitor for signs and symptoms of infection  - Monitor lab/diagnostic results  - Monitor all insertion sites, i e  indwelling lines, tubes, and drains  - Monitor endotracheal (as able) and nasal secretions for changes in amount and color  - Umbarger appropriate cooling/warming therapies per order  - Administer medications as ordered  - Instruct and encourage patient and family to use good hand hygiene technique  - Identify and instruct in appropriate isolation precautions for identified infection/condition  Outcome: Progressing     Problem: SAFETY ADULT  Goal: Maintain or return to baseline ADL function  Description  INTERVENTIONS:  -  Assess patient's ability to carry out ADLs; assess patient's baseline for ADL function and identify physical deficits which impact ability to perform ADLs (bathing, care of mouth/teeth, toileting, grooming, dressing, etc )  - Assess/evaluate cause of self-care deficits   - Assess range of motion  - Assess patient's mobility; develop plan if impaired  - Assess patient's need for assistive devices and provide as appropriate  - Encourage maximum independence but intervene and supervise when necessary  ¯ Involve family in performance of ADLs  ¯ Assess for home care needs following discharge   ¯ Request OT consult to assist with ADL evaluation and planning for discharge  ¯ Provide patient education as appropriate  Outcome: Progressing  Goal: Maintain or return mobility status to optimal level  Description  INTERVENTIONS:  - Assess patient's baseline mobility status (ambulation, transfers, stairs, etc )    - Identify cognitive and physical deficits and behaviors that affect mobility  - Identify mobility aids required to assist with transfers and/or ambulation (gait belt, sit-to-stand, lift, walker, cane, etc )  - Umbarger fall precautions as indicated by assessment  - Record patient progress and toleration of activity level on Mobility SBAR; progress patient to next Phase/Stage  - Instruct patient to call for assistance with activity based on assessment  - Request Rehabilitation consult to assist with strengthening/weightbearing, etc   Outcome: Progressing     Problem: DISCHARGE PLANNING  Goal: Discharge to home or other facility with appropriate resources  Description  INTERVENTIONS:  - Identify barriers to discharge w/patient and caregiver  - Arrange for needed discharge resources and transportation as appropriate  - Identify discharge learning needs (meds, wound care, etc )  - Arrange for interpretive services to assist at discharge as needed  - Refer to Case Management Department for coordinating discharge planning if the patient needs post-hospital services based on physician/advanced practitioner order or complex needs related to functional status, cognitive ability, or social support system  Outcome: Progressing     Problem: Knowledge Deficit  Goal: Patient/family/caregiver demonstrates understanding of disease process, treatment plan, medications, and discharge instructions  Description  Complete learning assessment and assess knowledge base    Interventions:  - Provide teaching at level of understanding  - Provide teaching via preferred learning methods  Outcome: Progressing     Problem: DISCHARGE PLANNING - CARE MANAGEMENT  Goal: Discharge to post-acute care or home with appropriate resources  Description  INTERVENTIONS:  - Conduct assessment to determine patient/family and health care team treatment goals, and need for post-acute services based on payer coverage, community resources, and patient preferences, and barriers to discharge  - Address psychosocial, clinical, and financial barriers to discharge as identified in assessment in conjunction with the patient/family and health care team  - Arrange appropriate level of post-acute services according to patient's   needs and preference and payer coverage in collaboration with the physician and health care team  - Communicate with and update the patient/family, physician, and health care team regarding progress on the discharge plan  - Arrange appropriate transportation to post-acute venues  Pt will need PT/OT for further recommendations  Pt was just discharged from [facility]     Outcome: Progressing

## 2019-07-04 NOTE — PROGRESS NOTES
Beltran Scanlon#  LRA:[id number] Edgardo Manzano  FTX:[id number]    POO:[id number]  Adm Date: 6/30/2019 1855  6:55 PM   ATT PHY: Malikjohn Taverasalesha, [address]         [facility]     The patient was seen after reviewing the chart and discussing the case with caring staff  Today during our encounter, the patient reported continued unchanged weakness  He otherwise denies any acute concerns  Notable labs today include worsening H/H from 8 1/24 5 to 7 7/22 9 and improving creatinine down to 1 92     Objective     Vitals:    07/04/19 0720   BP: 145/67   Pulse: 77   Resp: 18   Temp: 98 °F (36 7 °C)   SpO2: 97%       General Appearance: Awake and Alert  No acute distress  HEENT: Normocephalic, atraumatic  PERRLA, EOMI, MMM  Heart: RRR, no murmurs  Normal S1 and S2   Lungs: CTA bilaterally with fair air entry  Assessment     Juliet Schrader is a(n) [de-identified]y o  year old male with acute ESBL positive UTI and possible urosepsis     1  ESBL positive UTI with possible urosepsis  Sepsis diagnosis is possible as evidenced by elevated WBC count and fever, but there was no evidence of hypotension or tachycardia  Patient now has low grade fever and resolution in WBC count  Continue Ertapenem 500mg every 24 hours for 7 more days (renally adjusted)  2  Epididymitis  Continue PO Bactrim SS every 12 hours Day # 3/10 for this for additional coverage  Bactrim is also a sensitive antibiotic for his above UTI, and so can be transitioned to this a single PO option once discharged  3  Cardiac with history of Hypertension and CAD  Amlodipine 10mg once daily and aspirin 81mg  Will hold addition of ACE-I given SILVERIO  4  Type 2 Diabetes Mellitus  Levemir 10 units at bedtime and low dose sliding scale  Accu checks ACHS  Carb Controlled Diet  5  Acute on Chronic Kidney Disease  Improving  Lurlene Fanning with Nephrology is on consult  Normal saline 125cc/hr  Will continue to trend CMP    5  Iron Deficiency Anemia/Anemia of CKD  Continue Venofer 200mg IV Daily  X 2 more Doses  Based on worsening H/H and symptoms, with patient's consent, I will transfuse him with 2 units PRBC  I have also added Procrit 5,000 units three times per week  Will continue to monitor H&H   6  Hypokalemia  Resolved  7  Generalized Weakness/Gait Disturbance  PT/OT  8  DJD/OA and gout  Tylenol as needed  Colchicine 0 3mg once daily (renally adjusted)  9  GERD  Protonix  10  Nausea  Zofran as needed  11  Bladder Cancer/Urine Retention/BPH  Flomax  Dr Pamela Boyd has seen the patient and has recommended continue Haile's catheter along with IV antibiotics and oral Antibiotic to go home with  He will follow the patient on outpatient basis for future for catheter changes  VTE prophylaxis: Although there is no evidence of acute bleed, given low H/H, Will wait on pharmacologic prophylaxis  Patient may have sequential compression device  Discharge planning: Not yet clear for discharge based on anemia/lethargy  PT recommends [facility] again, but the family is more so interested in home PT along with his already scheduled [facility]   Case management is trying to make arrangements for this  The patient was discussed with Dr Neptali Bryan and he is in agreement with the above note

## 2019-07-04 NOTE — PLAN OF CARE
Problem: Potential for Falls  Goal: Patient will remain free of falls  Description  INTERVENTIONS:  - Assess patient frequently for physical needs  -  Identify cognitive and physical deficits and behaviors that affect risk of falls    -  [place] fall precautions as indicated by assessment   - Educate patient/family on patient safety including physical limitations  - Instruct patient to call for assistance with activity based on assessment  - Modify environment to reduce risk of injury  - Consider OT/PT consult to assist with strengthening/mobility  Outcome: Progressing     Problem: Prexisting or High Potential for Compromised Skin Integrity  Goal: Skin integrity is maintained or improved  Description  INTERVENTIONS:  - Identify patients at risk for skin breakdown  - Assess and monitor skin integrity  - Assess and monitor nutrition and hydration status  - Monitor labs (i e  albumin)  - Assess for incontinence   - Turn and reposition patient  - Assist with mobility/ambulation  - Relieve pressure over bony prominences  - Avoid friction and shearing  - Provide appropriate hygiene as needed including keeping skin clean and dry  - Evaluate need for skin moisturizer/barrier cream  - Collaborate with interdisciplinary team (i e  Nutrition, Rehabilitation, etc )   - Patient/family teaching  Outcome: Progressing     Problem: PAIN - ADULT  Goal: Verbalizes/displays adequate comfort level or baseline comfort level  Description  Interventions:  - Encourage patient to monitor pain and request assistance  - Assess pain using appropriate pain scale  - Administer analgesics based on type and severity of pain and evaluate response  - Implement non-pharmacological measures as appropriate and evaluate response  - Consider cultural and social influences on pain and pain management  - Notify physician/advanced practitioner if interventions unsuccessful or patient reports new pain  Outcome: Progressing     Problem: INFECTION - ADULT  Goal: Absence or prevention of progression during hospitalization  Description  INTERVENTIONS:  - Assess and monitor for signs and symptoms of infection  - Monitor lab/diagnostic results  - Monitor all insertion sites, i e  indwelling lines, tubes, and drains  - Monitor endotracheal (as able) and nasal secretions for changes in amount and color  - [place] appropriate cooling/warming therapies per order  - Administer medications as ordered  - Instruct and encourage patient and family to use good hand hygiene technique  - Identify and instruct in appropriate isolation precautions for identified infection/condition  Outcome: Progressing     Problem: SAFETY ADULT  Goal: Maintain or return to baseline ADL function  Description  INTERVENTIONS:  -  Assess patient's ability to carry out ADLs; assess patient's baseline for ADL function and identify physical deficits which impact ability to perform ADLs (bathing, care of mouth/teeth, toileting, grooming, dressing, etc )  - Assess/evaluate cause of self-care deficits   - Assess range of motion  - Assess patient's mobility; develop plan if impaired  - Assess patient's need for assistive devices and provide as appropriate  - Encourage maximum independence but intervene and supervise when necessary  ¯ Involve family in performance of ADLs  ¯ Assess for home care needs following discharge   ¯ Request OT consult to assist with ADL evaluation and planning for discharge  ¯ Provide patient education as appropriate  Outcome: Progressing  Goal: Maintain or return mobility status to optimal level  Description  INTERVENTIONS:  - Assess patient's baseline mobility status (ambulation, transfers, stairs, etc )    - Identify cognitive and physical deficits and behaviors that affect mobility  - Identify mobility aids required to assist with transfers and/or ambulation (gait belt, sit-to-stand, lift, walker, cane, etc )  - [place] fall precautions as indicated by assessment  - Record patient progress and toleration of activity level on Mobility SBAR; progress patient to next Phase/Stage  - Instruct patient to call for assistance with activity based on assessment  - Request Rehabilitation consult to assist with strengthening/weightbearing, etc   Outcome: Progressing     Problem: DISCHARGE PLANNING  Goal: Discharge to home or other facility with appropriate resources  Description  INTERVENTIONS:  - Identify barriers to discharge w/patient and caregiver  - Arrange for needed discharge resources and transportation as appropriate  - Identify discharge learning needs (meds, wound care, etc )  - Arrange for interpretive services to assist at discharge as needed  - Refer to Case Management Department for coordinating discharge planning if the patient needs post-hospital services based on physician/advanced practitioner order or complex needs related to functional status, cognitive ability, or social support system  Outcome: Progressing     Problem: Knowledge Deficit  Goal: Patient/family/caregiver demonstrates understanding of disease process, treatment plan, medications, and discharge instructions  Description  Complete learning assessment and assess knowledge base    Interventions:  - Provide teaching at level of understanding  - Provide teaching via preferred learning methods  Outcome: Progressing     Problem: DISCHARGE PLANNING - CARE MANAGEMENT  Goal: Discharge to post-acute care or home with appropriate resources  Description  INTERVENTIONS:  - Conduct assessment to determine patient/family and health care team treatment goals, and need for post-acute services based on payer coverage, community resources, and patient preferences, and barriers to discharge  - Address psychosocial, clinical, and financial barriers to discharge as identified in assessment in conjunction with the patient/family and health care team  - Arrange appropriate level of post-acute services according to patient's   needs and preference and payer coverage in collaboration with the physician and health care team  - Communicate with and update the patient/family, physician, and health care team regarding progress on the discharge plan  - Arrange appropriate transportation to post-acute venues  Pt will need PT/OT for further recommendations  Pt was just discharged from [facility]     Outcome: Progressing

## 2019-07-05 DIAGNOSIS — Z71.89 COMPLEX CARE COORDINATION: Primary | ICD-10-CM

## 2019-07-05 LAB
ABO GROUP BLD BPU: NORMAL
ABO GROUP BLD BPU: NORMAL
ANION GAP SERPL CALCULATED.3IONS-SCNC: 9 MMOL/L (ref 4–13)
BASOPHILS # BLD AUTO: 0.1 THOUSANDS/ΜL (ref 0–0.1)
BASOPHILS NFR BLD AUTO: 1 % (ref 0–2)
BPU ID: NORMAL
BPU ID: NORMAL
BUN SERPL-MCNC: 26 MG/DL (ref 7–25)
CALCIUM SERPL-MCNC: 8.4 MG/DL (ref 8.6–10.5)
CHLORIDE SERPL-SCNC: 112 MMOL/L (ref 98–107)
CO2 SERPL-SCNC: 20 MMOL/L (ref 21–31)
CREAT SERPL-MCNC: 1.84 MG/DL (ref 0.7–1.3)
CROSSMATCH: NORMAL
CROSSMATCH: NORMAL
EOSINOPHIL # BLD AUTO: 0.2 THOUSAND/ΜL (ref 0–0.61)
EOSINOPHIL NFR BLD AUTO: 3 % (ref 0–5)
ERYTHROCYTE [DISTWIDTH] IN BLOOD BY AUTOMATED COUNT: 16.5 % (ref 11.5–14.5)
GFR SERPL CREATININE-BSD FRML MDRD: 34 ML/MIN/1.73SQ M
GLUCOSE SERPL-MCNC: 201 MG/DL (ref 65–140)
GLUCOSE SERPL-MCNC: 234 MG/DL (ref 65–140)
GLUCOSE SERPL-MCNC: 247 MG/DL (ref 65–140)
GLUCOSE SERPL-MCNC: 78 MG/DL (ref 65–140)
GLUCOSE SERPL-MCNC: 84 MG/DL (ref 65–99)
HCT VFR BLD AUTO: 32.4 % (ref 42–47)
HGB BLD-MCNC: 10.9 G/DL (ref 14–18)
LYMPHOCYTES # BLD AUTO: 1 THOUSANDS/ΜL (ref 0.6–4.47)
LYMPHOCYTES NFR BLD AUTO: 11 % (ref 21–51)
MCH RBC QN AUTO: 29.2 PG (ref 26–34)
MCHC RBC AUTO-ENTMCNC: 33.5 G/DL (ref 31–37)
MCV RBC AUTO: 87 FL (ref 81–99)
MONOCYTES # BLD AUTO: 1 THOUSAND/ΜL (ref 0.17–1.22)
MONOCYTES NFR BLD AUTO: 12 % (ref 2–12)
NEUTROPHILS # BLD AUTO: 6.2 THOUSANDS/ΜL (ref 1.4–6.5)
NEUTS SEG NFR BLD AUTO: 73 % (ref 42–75)
PLATELET # BLD AUTO: 170 THOUSANDS/UL (ref 149–390)
PMV BLD AUTO: 8.7 FL (ref 8.6–11.7)
POTASSIUM SERPL-SCNC: 3.8 MMOL/L (ref 3.5–5.5)
RBC # BLD AUTO: 3.73 MILLION/UL (ref 4.3–5.9)
SODIUM SERPL-SCNC: 141 MMOL/L (ref 134–143)
UNIT DISPENSE STATUS: NORMAL
UNIT DISPENSE STATUS: NORMAL
UNIT PRODUCT CODE: NORMAL
UNIT PRODUCT CODE: NORMAL
UNIT RH: NORMAL
UNIT RH: NORMAL
WBC # BLD AUTO: 8.5 THOUSAND/UL (ref 4.8–10.8)

## 2019-07-05 PROCEDURE — 82948 REAGENT STRIP/BLOOD GLUCOSE: CPT

## 2019-07-05 PROCEDURE — 80048 BASIC METABOLIC PNL TOTAL CA: CPT | Performed by: PHYSICIAN ASSISTANT

## 2019-07-05 PROCEDURE — 85025 COMPLETE CBC W/AUTO DIFF WBC: CPT | Performed by: PHYSICIAN ASSISTANT

## 2019-07-05 PROCEDURE — 97530 THERAPEUTIC ACTIVITIES: CPT

## 2019-07-05 PROCEDURE — 97535 SELF CARE MNGMENT TRAINING: CPT

## 2019-07-05 RX ADMIN — PANTOPRAZOLE SODIUM 40 MG: 40 TABLET, DELAYED RELEASE ORAL at 06:03

## 2019-07-05 RX ADMIN — AMLODIPINE BESYLATE 10 MG: 5 TABLET ORAL at 09:00

## 2019-07-05 RX ADMIN — INSULIN LISPRO 3 UNITS: 100 INJECTION, SOLUTION INTRAVENOUS; SUBCUTANEOUS at 17:05

## 2019-07-05 RX ADMIN — INSULIN LISPRO 2 UNITS: 100 INJECTION, SOLUTION INTRAVENOUS; SUBCUTANEOUS at 11:17

## 2019-07-05 RX ADMIN — SODIUM CHLORIDE 500 MG: 9 INJECTION, SOLUTION INTRAVENOUS at 11:17

## 2019-07-05 RX ADMIN — SULFAMETHOXAZOLE AND TRIMETHOPRIM 1 TABLET: 400; 80 TABLET ORAL at 21:55

## 2019-07-05 RX ADMIN — SULFAMETHOXAZOLE AND TRIMETHOPRIM 1 TABLET: 400; 80 TABLET ORAL at 09:01

## 2019-07-05 RX ADMIN — INSULIN LISPRO 3 UNITS: 100 INJECTION, SOLUTION INTRAVENOUS; SUBCUTANEOUS at 21:55

## 2019-07-05 RX ADMIN — INSULIN DETEMIR 10 UNITS: 100 INJECTION, SOLUTION SUBCUTANEOUS at 21:56

## 2019-07-05 RX ADMIN — Medication 1 CAPSULE: at 17:05

## 2019-07-05 RX ADMIN — IRON SUCROSE 200 MG: 20 INJECTION, SOLUTION INTRAVENOUS at 09:03

## 2019-07-05 RX ADMIN — COLCHICINE 0.3 MG: 0.6 TABLET, FILM COATED ORAL at 09:00

## 2019-07-05 RX ADMIN — ASPIRIN 81 MG: 81 TABLET, COATED ORAL at 09:00

## 2019-07-05 RX ADMIN — TAMSULOSIN HYDROCHLORIDE 0.4 MG: 0.4 CAPSULE ORAL at 17:05

## 2019-07-05 NOTE — PROGRESS NOTES
Progress Note - Nimo Kinsey [de-identified] y o  male MRN: [id number]    Unit/Bed#: -01 Encounter: [id number]      Assessment:  Urinary retention, urinary tract infection  Renal function improving  Plan:  Continue Haile catheter  Complete course of Bactrim  I do not recommend subsequent prophylactic antibiotics  I will follow up with him as an outpatient for future Haile catheter changes  Subjective:   Feels better  Tolerating Haile catheter well  Objective:     Vitals: Blood pressure 154/72, pulse 70, temperature 98 1 °F (36 7 °C), temperature source Temporal, resp  rate 17, height 5' 8" (1 727 m), weight 69 9 kg (154 lb 1 oz), SpO2 98 %  ,Body mass index is 23 43 kg/m²  Intake/Output Summary (Last 24 hours) at 7/5/2019 1738  Last data filed at 7/5/2019 1459  Gross per 24 hour   Intake 1180 ml   Output 4500 ml   Net -3320 ml       Physical Exam: Male genitalia: normal   Haile catheter intact draining clear urine well  Invasive Devices     Peripheral Intravenous Line            Peripheral IV 07/02/19 Left;Ventral (anterior) Forearm 2 days          Drain            Urethral Catheter Latex 16 Fr  6 days                Lab, Imaging and other studies: I have personally reviewed pertinent reports      VTE Pharmacologic Prophylaxis: Sequential compression device (Venodyne)   VTE Mechanical Prophylaxis: sequential compression device

## 2019-07-05 NOTE — PROGRESS NOTES
Beltran Scanlon#  [id number] Nathaniel Madrid  PSU:[id number]    OIK:[id number]  Adm Date: 6/30/2019 1855  6:55 PM   ATT PHY: Alonzo Essex, [address]         Subjective     The patient was seen after reviewing the chart and discussing the case with caring staff  Of note, the patient's H/H improved from 7 7/22 9 to 10 9/32 4 today afater transfusion of 2 units PRBC  His creatinine is also improved to 1 84  Today during our encounter, the patient reported that his lethargy/weakness is slightly improved today  He otherwise denies any acute concerns  Objective     Vitals:    07/05/19 0743   BP: 163/71   Pulse: 67   Resp: 18   Temp: 97 9 °F (36 6 °C)   SpO2: 100%       General Appearance: Awake and Alert  No acute distress  HEENT: Normocephalic, atraumatic  PERRLA, EOMI, MMM  Heart: RRR, no murmurs  Normal S1 and S2   Lungs: CTA bilaterally with fair air entry  Assessment     Nimo Kinsey is a(n) [de-identified]y o  year old male with acute ESBL positive UTI and possible urosepsis     1  ESBL positive UTI with possible urosepsis  Sepsis diagnosis is possible as evidenced by elevated WBC count and fever, but there was no evidence of hypotension or tachycardia  Fever has completely resolved  Continue Ertapenem 500mg every 24 hours for 6 more days (renally adjusted)  2  Epididymitis  Continue PO Bactrim SS every 12 hours Day # 4/10  for additional coverage  Bactrim is also a sensitive antibiotic for his above UTI, and so can be transitioned to this a single PO option once discharged  3  Cardiac with history of Hypertension and CAD  Amlodipine 10mg once daily and aspirin 81mg  Will hold addition of ACE-I given SILVERIO  4  Type 2 Diabetes Mellitus  Levemir 10 units at bedtime and low dose sliding scale  Accu checks ACHS  Carb Controlled Diet  5  Acute on Chronic Kidney Disease  Improving  Elena Almanza with Nephrology is on consult  Normal saline 125cc/hr   Will continue to trend CMP  5  Iron Deficiency Anemia/Anemia of CKD  Continue Venofer 200mg IV Daily  X 1 more dose and Procrit 5,000 units three times per week  Will continue to monitor H&H   6  Hypokalemia  Resolved  7  Generalized Weakness/Gait Disturbance  PT/OT  8  DJD/OA and gout  Tylenol as needed  Colchicine 0 3mg once daily (renally adjusted)  9  GERD  Protonix  10  Nausea  Zofran as needed  11  Bladder Cancer/Urine Retention/BPH  Flomax  Dr Rebecca Iniguez has seen the patient and has recommended continue Haile's catheter along with IV antibiotics and oral Antibiotic to go home with  He will follow the patient on outpatient basis for future for catheter changes  VTE prophylaxis: Although there is no evidence of acute bleed, given low H/H, Will wait on pharmacologic prophylaxis  Patient may have sequential compression device  Discharge planning: Will ensure that the patient's H/H is stable before discharge  Patient/caretaker wishes to have home PT with [facility] despite PT/provider recommendations to do inpatient STR again  Case management has successfully arranged for this for discharge once medically cleared  Ambulatory Referral for PT has already been tentatively placed  The patient was discussed with Dr Cece Abernathy and he is in agreement with the above note

## 2019-07-05 NOTE — PROGRESS NOTES
Progress Note - Nephrology   Ricci Cuellar [de-identified] y o  male MRN: [id number]  Unit/Bed#: -01 Encounter: [id number]    A/P:  1  Acute renal failure sure what on top of chronic kidney disease              renal function improved this morning, continue with Haile catheter  Patient along on IV fluids and appears to be eating and drinking well on his own  2  Chronic kidney disease stage 3a with baseline creatinine likely around 1 4-1 5 mg/dL   Patient has not been able to achieve this prior baseline creatinine of 0 9 - 1 mg/dL for some time  3  Probable diabetic nephropathy  4  Proteinuria  5  Neuroendocrine tumor                6  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   Patient explained that there may have been a break in universal precautions with his most recent Haile catheter exchange  7  ESBL positive urinary tract infection    Patient currently being treated with ertapenem 500 mg Q 24 hours, this is appropriately dosed from the renal standpoint  8  Iron deficiency anemia   Patient is currently having Venofer infusions, we should consider holding Venofer infusions the patient is toward the tail end of his antibiotic regiment given that there is a relative contraindication to IV iron in the setting of an acute infection  Patient appears to be tolerating the infusions well at this time  9  Anemia of chronic illness/chronic kidney disease   Patient has been placed on an erythropoietin stimulating agent, patient had an erythropoietin level checked in February 2019 which was relatively reduced given hemoglobin at 7 7 g/dL at the time it was checked  Patient should be evaluated by Hematology to ensure that is appropriate given MARIAH in the setting of a neuroendocrine tumor with possible metastases  Given low dose at this time, there is no need to discontinue this and instead should be followed up by hematologist for continuation in the outpatient setting as indicated      Follow up reason for today's visit:  Acute kidney injury/chronic kidney disease    Urinary tract infection due to extended-spectrum beta lactamase (ESBL) producing Escherichia coli    Patient Active Problem List   Diagnosis    Weakness generalized    Type 2 diabetes mellitus with complication, with long-term current use of insulin ([facility] [address] )    Essential hypertension    Mixed hyperlipidemia    Cardiac disease    Generalized abdominal mass    Hydroureter    Anemia    Syncope and collapse    Accelerated hypertension    Liver mass    Neuroendocrine tumor    Acute cystitis without hematuria    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)    Acute kidney injury superimposed on chronic kidney disease (HCC)    Acute pain of left knee    Pressure injury of right heel, unstageable ([address] )    Atherosclerosis of artery of extremity with ulceration (HCC)    Carcinoid syndrome (HCC)    Sepsis (HCC)    Gram-negative bacteremia    Left hip pain    Acute cystitis with hematuria    Chronic indwelling Haile catheter    Moderate protein-calorie malnutrition (HCC)    Biliary sludge    Urinary tract infection due to extended-spectrum beta lactamase (ESBL) producing Escherichia coli         Subjective:   No acute events overnight, patient is feeling better overall  Objective:     Vitals: Blood pressure 154/72, pulse 70, temperature 98 1 °F (36 7 °C), temperature source Temporal, resp  rate 17, height 5' 8" (1 727 m), weight 69 9 kg (154 lb 1 oz), SpO2 98 %  ,Body mass index is 23 43 kg/m²  Weight (last 2 days)     None            Intake/Output Summary (Last 24 hours) at 7/5/2019 1613  Last data filed at 7/5/2019 1459  Gross per 24 hour   Intake 1390 ml   Output 4500 ml   Net -3110 ml     I/O last 3 completed shifts: In: 1250 [P O :550; Blood:700]  Out: 6875 [Urine:6875]    Urethral Catheter Latex 16 Fr  (Active)   Site Assessment Skin intact; Clean 7/3/2019  8:38 AM   Collection Container Standard drainage bag 7/3/2019  8:38 AM   Securement Method Securing device (Describe) 7/3/2019  8:38 AM   Output (mL) 1200 mL 7/5/2019 12:08 PM       Physical Exam: /72 (BP Location: Right arm)   Pulse 70   Temp 98 1 °F (36 7 °C) (Temporal)   Resp 17   Ht 5' 8" (1 727 m) Comment: Stated  Wt 69 9 kg (154 lb 1 oz) Comment: Weigh bed  SpO2 98%   BMI 23 43 kg/m²     General Appearance:    Alert, cooperative, no distress, appears stated age   Head:    Normocephalic, without obvious abnormality, atraumatic   Eyes:    Conjunctiva/corneas clear   Ears:    Normal external ears   Nose:   Nares normal, septum midline, mucosa normal, no drainage    or sinus tenderness   Throat:   Lips, mucosa, and tongue normal; teeth and gums normal   Neck:   Supple   Back:     Symmetric, no curvature, ROM normal, no CVA tenderness   Lungs:     Clear to auscultation bilaterally, respirations unlabored   Chest wall:    No tenderness or deformity   Heart:    Regular rate and rhythm, S1 and S2 normal, no murmur, rub   or gallop   Abdomen:     Soft, non-tender, bowel sounds active   Extremities:   Extremities normal, atraumatic, no cyanosis or edema   Skin:   Skin color, texture, turgor normal, no rashes or lesions   Lymph nodes:   Cervical normal   Neurologic:   CNII-XII intact            Lab, Imaging and other studies: I have personally reviewed pertinent labs  CBC:   Lab Results   Component Value Date    WBC 8 50 07/05/2019    HGB 10 9 (L) 07/05/2019    HCT 32 4 (L) 07/05/2019    MCV 87 07/05/2019     07/05/2019    MCH 29 2 07/05/2019    MCHC 33 5 07/05/2019    RDW 16 5 (H) 07/05/2019    MPV 8 7 07/05/2019     CMP:   Lab Results   Component Value Date    K 3 8 07/05/2019     (H) 07/05/2019    CO2 20 (L) 07/05/2019    BUN 26 (H) 07/05/2019    CREATININE 1 84 (H) 07/05/2019    CALCIUM 8 4 (L) 07/05/2019    EGFR 34 07/05/2019           Results from last 7 days   Lab Units 07/05/19  0603 07/04/19  1012 07/03/19  0609 07/02/19  0532   POTASSIUM mmol/L 3 8 4 0 3 9 3 7   CHLORIDE mmol/L 112* 110* 109* 110*   CO2 mmol/L 20* 19* 20* 20*   BUN mg/dL 26* 31* 38* 39*   CREATININE mg/dL 1 84* 1 92* 2 26* 2 28*   CALCIUM mg/dL 8 4* 8 3* 8 6 8 5*   ALK PHOS U/L  --  120 80 71   ALT U/L  --  9 7 6*   AST U/L  --  10* 7* 6*         Phosphorus: No results found for: PHOS  Magnesium: No results found for: MG  Urinalysis: No results found for: COLORU, CLARITYU, SPECGRAV, PHUR, LEUKOCYTESUR, NITRITE, PROTEINUA, GLUCOSEU, KETONESU, BILIRUBINUR, BLOODU  Ionized Calcium: No results found for: CAION  Coagulation: No results found for: PT, INR, APTT  Troponin: No results found for: TROPONINI  ABG: No results found for: PHART, UZY2NDG, PO2ART, KWY5CBD, A9TZCYXD, BEART, SOURCE  Radiology review:     IMAGING  No results found      Current Facility-Administered Medications   Medication Dose Route Frequency    acetaminophen (TYLENOL) tablet 650 mg  650 mg Oral Q4H PRN    amLODIPine (NORVASC) tablet 10 mg  10 mg Oral Daily    aspirin (ECOTRIN LOW STRENGTH) EC tablet 81 mg  81 mg Oral Daily    b complex-vitamin C-folic acid (NEPHROCAPS) capsule 1 capsule  1 capsule Oral Daily With Dinner    colchicine (COLCRYS) tablet 0 3 mg  0 3 mg Oral Daily    epoetin nicko (EPOGEN,PROCRIT) injection 5,000 Units  5,000 Units Intravenous Once per day on Mon Wed Fri    ergocalciferol (VITAMIN D2) capsule 50,000 Units  50,000 Units Oral Weekly    ertapenem (INVanz) 500 mg in sodium chloride 0 9 % 50 mL IVPB  500 mg Intravenous Q24H    glucose chewable tablet 16 g  16 g Oral Once PRN    insulin detemir (LEVEMIR) subcutaneous injection 10 Units  10 Units Subcutaneous HS    insulin lispro (HumaLOG) 100 units/mL subcutaneous injection 1-6 Units  1-6 Units Subcutaneous 4x Daily (PC & HS)    ondansetron (ZOFRAN) injection 4 mg  4 mg Intravenous Q6H PRN    pantoprazole (PROTONIX) EC tablet 40 mg  40 mg Oral Early Morning    simethicone (MYLICON) chewable tablet 80 mg  80 mg Oral Q6H PRN    sodium chloride 0 9 % infusion  125 mL/hr Intravenous Continuous    sulfamethoxazole-trimethoprim (BACTRIM) 400-80 mg per tablet 1 tablet  1 tablet Oral Q12H [address]    tamsulosin (FLOMAX) capsule 0 4 mg  0 4 mg Oral Daily With Dinner     Medications Discontinued During This Encounter   Medication Reason    insulin lispro (HumaLOG) 100 units/mL subcutaneous injection 1-6 Units     insulin lispro (HumaLOG) 100 units/mL subcutaneous injection 1-6 Units     ondansetron (ZOFRAN-ODT) dispersible tablet 4 mg     Cholecalciferol TABS 50,000 Units     potassium chloride 10 % oral solution 20 mEq     ferrous sulfate tablet 325 mg     iron polysaccharides (NIFEREX) capsule 150 mg        Rima Melissa DO

## 2019-07-05 NOTE — SOCIAL WORK
Discussed the POC with WISAM SHINE  PT will be here for the week-end as he just received an infusion and is very weak from same  SO indicated her will go home with Jamie Ochoa and she will transport when medically cleared  Made a referral to the outpt care coordinator

## 2019-07-05 NOTE — UTILIZATION REVIEW
Continued Stay Review    Date: 7/5/19                          Current Patient Class: INPATIENT  Current Level of Care: med/surg    HPI:79 y o  male initially admitted on 6/30 due to ESBL UTI with possible urosepsis, SILVERIO, iron deficiency anemia, generalized weakness, hypokalemia  Assessment/Plan:   7/2 Nephrology consult:  SILVERIO, consider ATN due to febrile illness with ESBL infection  Check urine studies  7/3 Urology consult:  UTI with fever secondary to malpositioned garcia catheter with bacterial colonization of chronic garcia  No sign of upper tract obstruction, epididymitis or prostatitis  Continue garcia catheter  Continue IV antibiotics followed by outpatient po antibiotics  Outpatient garcia catheter changes  7/4 Transfused with 2 units PRBC for worsening H/H  Start procrit  Continue IV Venofer daily  7/5 Pt with low grade fevers  Continue IV antibiotics for 7 more days  Continue IVF  Continue PT/OT      Pertinent Labs/Diagnostic Results:   Results from last 7 days   Lab Units 07/05/19  0603 07/04/19  0519 07/03/19  0609 07/02/19  0532   WBC Thousand/uL 8 50 6 60 6 20 9 20   HEMOGLOBIN g/dL 10 9* 7 7* 8 1* 8 3*   HEMATOCRIT % 32 4* 22 9* 24 5* 24 4*   PLATELETS Thousands/uL 170 157 153 136*   NEUTROS ABS Thousands/µL 6 20 4 90 4 40 7 70*     Results from last 7 days   Lab Units 07/05/19  0603 07/04/19  1012 07/03/19  0609 07/02/19  0532   SODIUM mmol/L 141 138 139 138   POTASSIUM mmol/L 3 8 4 0 3 9 3 7   CHLORIDE mmol/L 112* 110* 109* 110*   CO2 mmol/L 20* 19* 20* 20*   ANION GAP mmol/L 9 9 10 8   BUN mg/dL 26* 31* 38* 39*   CREATININE mg/dL 1 84* 1 92* 2 26* 2 28*   EGFR ml/min/1 73sq m 34 32 26 26   CALCIUM mg/dL 8 4* 8 3* 8 6 8 5*   MAGNESIUM mg/dL  --   --   --  1 5*     Results from last 7 days   Lab Units 07/04/19  1012 07/03/19  0609 07/02/19  0532 07/01/19  0459   AST U/L 10* 7* 6* 6*   ALT U/L 9 7 6* 6*   ALK PHOS U/L 120 80 71 76   TOTAL PROTEIN g/dL 5 9* 5 8* 6 2* 6 2*   ALBUMIN g/dL 2 8* 2  9* 2 9* 3 2*   TOTAL BILIRUBIN mg/dL 0 60 0 70 1 00 1 30*     Results from last 7 days   Lab Units 07/05/19  1115 07/05/19  0636 07/04/19  2301 07/04/19  1558 07/04/19  1116 07/04/19  0622 07/03/19  2024 07/03/19  1545 07/03/19  1117 07/03/19  0647   POC GLUCOSE mg/dl 201* 78 183* 265* 236* 154* 252* 218* 228* 189*     Results from last 7 days   Lab Units 07/05/19  0603 07/04/19  1012 07/03/19  0609 07/02/19  0532 07/01/19  0459 06/30/19  1942   GLUCOSE RANDOM mg/dL 84 220* 184* 198* 279* 229*     Results from last 7 days   Lab Units 06/30/19  2046   LACTIC ACID mmol/L 1 4     Results from last 7 days   Lab Units 07/02/19  0532   FERRITIN ng/mL 698*     Results from last 7 days   Lab Units 07/02/19  1417 06/30/19  1942 06/28/19 2031   CLARITY UA   --  Cloudy* Slightly Cloudy   COLOR UA   --  Yellow Yellow   SPEC GRAV UA   --  1 025 1 015   PH UA   --  5 5 6 0   GLUCOSE UA mg/dl  --  Negative Negative   KETONES UA mg/dl  --  Negative Negative   BLOOD UA   --  2+* Large*   PROTEIN UA mg/dl  --  2+* 30 (1+)*   NITRITE UA   --  Negative Negative   BILIRUBIN UA   --  Negative Negative   UROBILINOGEN UA E U /dl  --  0 2 0 2   LEUKOCYTES UA   --  2+* Large*   WBC UA /hpf  --  30-50* 20-30*   RBC UA /hpf  --  4-10* 2-4*   BACTERIA UA /hpf  --  Occasional Occasional   EPITHELIAL CELLS WET PREP /hpf  --  Occasional Occasional   SODIUM UR  56  --   --    CREATININE UR mg/dL 50 5  50 5  50 5  --   --    PROTEIN UR mg/dL 98  --   --    PROT/CREAT RATIO UR  1 94*  --   --      Results from last 7 days   Lab Units 06/28/19 2031   URINE CULTURE  70,000-79,000 cfu/ml Escherichia coli ESBL*       Vital Signs:   07/05/19 0743 97 9 °F (36 6 °C) 67 18 163/71 100 % None (Room air)   07/05/19 0100 99 4 °F (37 4 °C) 76 18 158/68 96 % None (Room air)   07/04/19 2300 98 9 °F (37 2 °C) 76 20 160/78 97 % None (Room air)       Medications:   Scheduled Meds:   Current Facility-Administered Medications:  acetaminophen 650 mg Oral Q4H PRN amLODIPine 10 mg Oral Daily   aspirin 81 mg Oral Daily   b complex-vitamin C-folic acid 1 capsule Oral Daily With Dinner   colchicine 0 3 mg Oral Daily   epoetin nicko 5,000 Units Intravenous Once per day on Mon Wed Fri   ergocalciferol 50,000 Units Oral Weekly   ertapenem 500 mg Intravenous Q24H   glucose 16 g Oral Once PRN   insulin detemir 10 Units Subcutaneous HS   insulin lispro 1-6 Units Subcutaneous 4x Daily (PC & HS)   ondansetron 4 mg Intravenous Q6H PRN   pantoprazole 40 mg Oral Early Morning   simethicone 80 mg Oral Q6H PRN   sodium chloride 125 mL/hr Intravenous Continuous   sulfamethoxazole-trimethoprim 1 tablet Oral Q12H NATACHA   tamsulosin 0 4 mg Oral Daily With Dinner       Discharge Plan: TBD; PT recommending STR, but family interested in home PT with his already scheduled [facility]  Network Utilization Review Department  Phone: [phone]; Fax [phone]  [email or web address]  ATTENTION: Please call with any questions or concerns to [phone]  and carefully listen to the prompts so that you are directed to the right person  Send all requests for admission clinical reviews, approved or denied determinations and any other requests to fax [phone]   All voicemails are confidential

## 2019-07-05 NOTE — PLAN OF CARE
Problem: OCCUPATIONAL THERAPY ADULT  Goal: Performs self-care activities at highest level of function for planned discharge setting  See evaluation for individualized goals  Description  Treatment Interventions: ADL retraining, Functional transfer training, UE strengthening/ROM, Endurance training, Cognitive reorientation, Patient/family training, Equipment evaluation/education          See flowsheet documentation for full assessment, interventions and recommendations  Outcome: Progressing  Note:   Limitation: Decreased ADL status, Decreased UE strength, Decreased Safe judgement during ADL, Decreased cognition, Decreased endurance  Prognosis: Fair  Assessment: Patient participated in Skilled OT session this date with interventions consisting of functional transfer training, ADL re training with the use of correct body mechnaics and Energy Conservation techniques   Patient agreeable to OT treatment session, upon arrival patient was found supine in bed  At approach, patient reports he would like to sit at the EOB  Transfers supine to sit EOB with Mod A x 1 to get into upright position and assist with LE management  Sat EOB x 5 minutes with good sitting balance noted  Completed toilet transfers with Min A x 2 (second person to assist with IV pole)  Noted increased fatigue following toilet transfer  Patient requiring verbal cues for safety and ocassional safety reminders  Patient performance demonstrated good carryover of learned techniques and strategies to facilitate safety during functional tasks  Patient continues to be functioning below baseline level, occupational performance remains limited secondary to factors listed above and increased risk for falls and injury  From OT standpoint, recommendation at time of d/c would be Short Term Rehab    Patient to benefit from continued Occupational Therapy treatment while in the hospital to address deficits as defined above and maximize level of functional independence with ADLs and functional mobility in order to return to PLOF

## 2019-07-05 NOTE — OCCUPATIONAL THERAPY NOTE
07/05/19 1340   Restrictions/Precautions   Weight Bearing Precautions Per Order No   Other Precautions Contact/isolation; Fall Risk   Pain Assessment   Pain Assessment No/denies pain   Pain Score No Pain   ADL   Toileting Assistance  1  Total Assistance   Toileting Deficit Perineal hygiene; Increased time to complete;Grab bar use;Verbal cueing;Supervison/safety   Bed Mobility   Supine to Sit 3  Moderate assistance   Additional items Assist x 1;Bedrails; Increased time required;Verbal cues;LE management   Sit to Supine 3  Moderate assistance   Additional items Assist x 2;Bedrails; Increased time required;Verbal cues;LE management   Toilet Transfers   Toilet Transfer From [employer] Transfer Type To and from   Toilet Transfer to Standard toilet   Toilet Transfer Technique Ambulating   Toilet Transfers Minimal assistance; Moderate assistance   Cognition   Overall Cognitive Status WFL   Arousal/Participation Alert; Cooperative   Attention Within functional limits   Orientation Level Oriented X4   Memory Within functional limits   Following Commands Follows one step commands with increased time or repetition   Comments Pt agreeable to OT treatment   Activity Tolerance   Activity Tolerance Patient limited by fatigue   Assessment   Assessment Patient participated in Skilled OT session this date with interventions consisting of functional transfer training, ADL re training with the use of correct body mechnaics and Energy Conservation techniques   Patient agreeable to OT treatment session, upon arrival patient was found supine in bed  At approach, patient reports he would like to sit at the EOB  Transfers supine to sit EOB with Mod A x 1 to get into upright position and assist with LE management  Sat EOB x 5 minutes with good sitting balance noted  Completed toilet transfers with Min A x 2 (second person to assist with IV pole)  Noted increased fatigue following toilet transfer   Patient requiring verbal cues for safety and ocassional safety reminders  Patient performance demonstrated good carryover of learned techniques and strategies to facilitate safety during functional tasks  Patient continues to be functioning below baseline level, occupational performance remains limited secondary to factors listed above and increased risk for falls and injury  From OT standpoint, recommendation at time of d/c would be Short Term Rehab  Patient to benefit from continued Occupational Therapy treatment while in the hospital to address deficits as defined above and maximize level of functional independence with ADLs and functional mobility in order to return to PLOF      Plan   Treatment Interventions ADL retraining;Functional transfer training;Energy conservation   Goal Expiration Date 07/11/19   Treatment Day 1   OT Frequency 3-5x/wk   Sharene Fothergill Pagan-Robbins, COTA

## 2019-07-06 LAB
ANION GAP SERPL CALCULATED.3IONS-SCNC: 9 MMOL/L (ref 4–13)
BASOPHILS # BLD AUTO: 0.1 THOUSANDS/ΜL (ref 0–0.1)
BASOPHILS NFR BLD AUTO: 1 % (ref 0–2)
BUN SERPL-MCNC: 26 MG/DL (ref 7–25)
CALCIUM SERPL-MCNC: 8.4 MG/DL (ref 8.6–10.5)
CHLORIDE SERPL-SCNC: 110 MMOL/L (ref 98–107)
CO2 SERPL-SCNC: 22 MMOL/L (ref 21–31)
CREAT SERPL-MCNC: 1.8 MG/DL (ref 0.7–1.3)
EOSINOPHIL # BLD AUTO: 0.3 THOUSAND/ΜL (ref 0–0.61)
EOSINOPHIL NFR BLD AUTO: 3 % (ref 0–5)
ERYTHROCYTE [DISTWIDTH] IN BLOOD BY AUTOMATED COUNT: 17.2 % (ref 11.5–14.5)
GFR SERPL CREATININE-BSD FRML MDRD: 35 ML/MIN/1.73SQ M
GLUCOSE SERPL-MCNC: 187 MG/DL (ref 65–140)
GLUCOSE SERPL-MCNC: 277 MG/DL (ref 65–140)
GLUCOSE SERPL-MCNC: 72 MG/DL (ref 65–140)
GLUCOSE SERPL-MCNC: 84 MG/DL (ref 65–99)
HCT VFR BLD AUTO: 32.4 % (ref 42–47)
HGB BLD-MCNC: 10.9 G/DL (ref 14–18)
LYMPHOCYTES # BLD AUTO: 1.2 THOUSANDS/ΜL (ref 0.6–4.47)
LYMPHOCYTES NFR BLD AUTO: 14 % (ref 21–51)
MCH RBC QN AUTO: 29.3 PG (ref 26–34)
MCHC RBC AUTO-ENTMCNC: 33.6 G/DL (ref 31–37)
MCV RBC AUTO: 87 FL (ref 81–99)
MONOCYTES # BLD AUTO: 0.9 THOUSAND/ΜL (ref 0.17–1.22)
MONOCYTES NFR BLD AUTO: 10 % (ref 2–12)
NEUTROPHILS # BLD AUTO: 6.1 THOUSANDS/ΜL (ref 1.4–6.5)
NEUTS SEG NFR BLD AUTO: 72 % (ref 42–75)
PLATELET # BLD AUTO: 183 THOUSANDS/UL (ref 149–390)
PMV BLD AUTO: 8.5 FL (ref 8.6–11.7)
POTASSIUM SERPL-SCNC: 4 MMOL/L (ref 3.5–5.5)
RBC # BLD AUTO: 3.72 MILLION/UL (ref 4.3–5.9)
SODIUM SERPL-SCNC: 141 MMOL/L (ref 134–143)
WBC # BLD AUTO: 8.5 THOUSAND/UL (ref 4.8–10.8)

## 2019-07-06 PROCEDURE — 80048 BASIC METABOLIC PNL TOTAL CA: CPT | Performed by: PHYSICIAN ASSISTANT

## 2019-07-06 PROCEDURE — 82948 REAGENT STRIP/BLOOD GLUCOSE: CPT

## 2019-07-06 PROCEDURE — 85025 COMPLETE CBC W/AUTO DIFF WBC: CPT | Performed by: PHYSICIAN ASSISTANT

## 2019-07-06 RX ORDER — SULFAMETHOXAZOLE AND TRIMETHOPRIM 400; 80 MG/1; MG/1
1 TABLET ORAL EVERY 12 HOURS SCHEDULED
Qty: 11 TABLET | Refills: 0 | Status: SHIPPED | OUTPATIENT
Start: 2019-07-06 | End: 2019-07-12

## 2019-07-06 RX ORDER — CHOLECALCIFEROL (VITAMIN D3) 10 MCG
1 TABLET ORAL
Qty: 60 CAPSULE | Refills: 0 | Status: SHIPPED | OUTPATIENT
Start: 2019-07-07 | End: 2020-02-28 | Stop reason: SDUPTHER

## 2019-07-06 RX ADMIN — SULFAMETHOXAZOLE AND TRIMETHOPRIM 1 TABLET: 400; 80 TABLET ORAL at 21:39

## 2019-07-06 RX ADMIN — PANTOPRAZOLE SODIUM 40 MG: 40 TABLET, DELAYED RELEASE ORAL at 05:41

## 2019-07-06 RX ADMIN — INSULIN LISPRO 1 UNITS: 100 INJECTION, SOLUTION INTRAVENOUS; SUBCUTANEOUS at 13:22

## 2019-07-06 RX ADMIN — SULFAMETHOXAZOLE AND TRIMETHOPRIM 1 TABLET: 400; 80 TABLET ORAL at 08:30

## 2019-07-06 RX ADMIN — Medication 1 CAPSULE: at 16:19

## 2019-07-06 RX ADMIN — INSULIN DETEMIR 10 UNITS: 100 INJECTION, SOLUTION SUBCUTANEOUS at 21:39

## 2019-07-06 RX ADMIN — AMLODIPINE BESYLATE 10 MG: 5 TABLET ORAL at 08:29

## 2019-07-06 RX ADMIN — SODIUM CHLORIDE 500 MG: 9 INJECTION, SOLUTION INTRAVENOUS at 10:32

## 2019-07-06 RX ADMIN — COLCHICINE 0.3 MG: 0.6 TABLET, FILM COATED ORAL at 08:29

## 2019-07-06 RX ADMIN — INSULIN LISPRO 4 UNITS: 100 INJECTION, SOLUTION INTRAVENOUS; SUBCUTANEOUS at 21:39

## 2019-07-06 RX ADMIN — INSULIN LISPRO 4 UNITS: 100 INJECTION, SOLUTION INTRAVENOUS; SUBCUTANEOUS at 17:24

## 2019-07-06 RX ADMIN — TAMSULOSIN HYDROCHLORIDE 0.4 MG: 0.4 CAPSULE ORAL at 16:19

## 2019-07-06 RX ADMIN — ASPIRIN 81 MG: 81 TABLET, COATED ORAL at 08:29

## 2019-07-06 NOTE — NURSING NOTE
Patients IV infiltrated, removed, patient stated he is going home tomorrow, spoke with Dr Bettye Stein to stop fluids and leave IV out

## 2019-07-06 NOTE — DISCHARGE SUMMARY
Discharging Physician / Practitioner: Rito Siegel MD  PCP: Karolee Cheadle, DO  Admission Date:   Admission Orders (From admission, onward)    Ordered        06/30/19 2044  Inpatient Admission (expected length of stay for this patient Order details is greater than two midnights)  Once             Discharge Date: 07/06/19    Resolved Problems  Date Reviewed: 7/6/2019    None          Consultations During Hospital Stay:  · Nephrology    Procedures Performed:   · Blood transfusion    Significant Findings / Test Results:   · Acute on chronic renal failure  · Anemia requiring packed red blood cell transfusion  · Multiple medical comorbidities    Incidental Findings:   · None     Test Results Pending at Discharge (will require follow up): · None     Outpatient Tests Requested:  · None    Complications:     Urinary tract infection    Reason for Admission:  generalized weakness/fever    Hospital Course:     Pam Tate is a [de-identified] y o  male patient who originally presented to the hospital on 6/30/2019 due to generalized weakness and fever  Workup revealed evidence of Haile catheter dysfunction  Urinary tract infection  Patient treated initially with Rocephin then changed to Bactrim p o  Serial laboratory determinations revealed marked anemia requiring packed red blood cell transfusion  The patient was also given IV Venofer  Please see above list of diagnoses and related plan for additional information       Condition at Discharge: stable     Discharge Day Visit / Exam:     Subjective:  No new subjective complaint  Vitals: Blood Pressure: 163/73 (07/06/19 0733)  Pulse: 63 (07/06/19 0733)  Temperature: 97 5 °F (36 4 °C) (07/06/19 0733)  Temp Source: Tympanic (07/06/19 0733)  Respirations: 20 (07/06/19 0733)  Height: 5' 8" (172 7 cm)(Stated) (06/30/19 2132)  Weight - Scale: 69 9 kg (154 lb 1 oz)(Weigh bed) (06/30/19 2132)  SpO2: 99 % (07/06/19 0733)  Exam:   Physical Exam   Constitutional: He is oriented to person, place, and time  He appears well-developed and well-nourished  HENT:   Head: Normocephalic and atraumatic  Eyes: Conjunctivae and EOM are normal    Neck: Normal range of motion  Neck supple  Cardiovascular: Normal rate and regular rhythm  Murmur heard  Pulmonary/Chest: Effort normal and breath sounds normal    Abdominal: Soft  Bowel sounds are normal    Musculoskeletal: Normal range of motion  He exhibits no edema  Neurological: He is alert and oriented to person, place, and time  Skin: Skin is warm and dry  Capillary refill takes less than 2 seconds  Psychiatric: He has a normal mood and affect  Nursing note and vitals reviewed  Discussion with Family:  Family members present at on the day of discharge  Discharge instructions/Information to patient and family:   See after visit summary for information provided to patient and family  Provisions for Follow-Up Care:  See after visit summary for information related to follow-up care and any pertinent home health orders  Disposition:     Home    For Discharges to [facility]:   · Not Applicable to this Patient - Not Applicable to this Patient    Planned Readmission:    No     Discharge Statement:  I spent 40 minutes discharging the patient  This time was spent on the day of discharge  I had direct contact with the patient on the day of discharge  Greater than 50% of the total time was spent examining patient, answering all patient questions, arranging and discussing plan of care with patient as well as directly providing post-discharge instructions  Additional time then spent on discharge activities  Discharge Medications:  See after visit summary for reconciled discharge medications provided to patient and family        ** Please Note: This note has been constructed using a voice recognition system **    Discharge- Clemente Ramirez 1940, [de-identified] y o  male MRN: [id number]    Unit/Bed#: -01 Encounter: [id number]    Primary Care Provider: Luis Eduardo Barry DO   Date and time admitted to hospital: 6/30/2019  6:55 PM        No new Assessment & Plan notes have been filed under this hospital service since the last note was generated  Service: Internal Medicine              Resolved Problems  Date Reviewed: 7/6/2019    None          Admission Date:   Admission Orders (From admission, onward)    Ordered        06/30/19 2044  Inpatient Admission (expected length of stay for this patient Order details is greater than two midnights)  Once               Admitting Diagnosis: Weakness [R53 1]    HPI:  See above    Procedures Performed: No orders of the defined types were placed in this encounter  Summary of Hospital Course:  See above    Significant Findings, Care, Treatment and Services Provided:  See above    Complications:  None    Condition at Discharge: stable         Discharge instructions/Information to patient and family:   See after visit summary for information provided to patient and family  Provisions for Follow-Up Care:  See after visit summary for information related to follow-up care and any pertinent home health orders  PCP: Luis Eduardo Barry DO    Disposition: Home    Planned Readmission: No    Discharge Statement   I spent 40 minutes discharging the patient  This time was spent on the day of discharge  I had direct contact with the patient on the day of discharge  Additional documentation is required if more than 30 minutes were spent on discharge  Discharge Medications:  See after visit summary for reconciled discharge medications provided to patient and family

## 2019-07-06 NOTE — PROGRESS NOTES
Progress Note - Nephrology   José Luis Aguilar [de-identified] y o  male MRN: [id number]  Unit/Bed#: -01 Encounter: [id number]    A/P:  1  Acute renal failure sure what on top of chronic kidney disease             Creatinine stable at this time around 1 8 mg/dL  Continue supportive care at this time  May potentially reassess urine studies, the patient's fraction excretion of sodium greater than 2% and other means that he has reached a new baseline creatinine we are remains in acute tubular necrosis  However longer his creatinine remains stable more likely it is is reached a new baseline  2  Chronic kidney disease stage 3a with baseline creatinine likely around 1 4 - 1 5 mg/dL   Patient has not been able to achieve this prior baseline creatinine of 0 9 - 1 mg/dL for some time  3  Probable diabetic nephropathy  4  Proteinuria  5  Neuroendocrine tumor  6  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   Continue Haile catheter as recommended by urology  7  ESBL positive urinary tract infection    Patient currently being treated with ertapenem 500 mg Q 24 hours, this is appropriately dosed from the renal standpoint  8  Iron deficiency anemia   Patient had 3x 200 mg doses Venofer  9  Anemia of chronic illness/chronic kidney disease   As mentioned yesterday, patient is currently on 5000 units of Epogen 3 times a week, this should be assessed by hematologist and weigh the pros and cons of continuation of an MARIAH in the setting a potential neuroendocrine tumor      Follow up reason for today's visit:  Acute kidney injury/chronic kidney disease    Urinary tract infection due to extended-spectrum beta lactamase (ESBL) producing Escherichia coli    Patient Active Problem List   Diagnosis    Weakness generalized    Type 2 diabetes mellitus with complication, with long-term current use of insulin ([address] )    Essential hypertension    Mixed hyperlipidemia    Cardiac disease    Generalized abdominal mass    Hydroureter    Anemia    Syncope and collapse    Accelerated hypertension    Liver mass    Neuroendocrine tumor    Acute cystitis without hematuria    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)    Acute kidney injury superimposed on chronic kidney disease (HCC)    Acute pain of left knee    Pressure injury of right heel, unstageable (HCC)    Atherosclerosis of artery of extremity with ulceration (HCC)    Carcinoid syndrome (HCC)    Sepsis (HCC)    Gram-negative bacteremia    Left hip pain    Acute cystitis with hematuria    Chronic indwelling Haile catheter    Moderate protein-calorie malnutrition (HCC)    Biliary sludge    Urinary tract infection due to extended-spectrum beta lactamase (ESBL) producing Escherichia coli         Subjective:   No acute events overnight, patient is feeling better overall  Objective:     Vitals: Blood pressure 163/73, pulse 63, temperature 97 5 °F (36 4 °C), temperature source Tympanic, resp  rate 20, height 5' 8" (1 727 m), weight 69 9 kg (154 lb 1 oz), SpO2 99 %  ,Body mass index is 23 43 kg/m²  Weight (last 2 days)     None            Intake/Output Summary (Last 24 hours) at 7/6/2019 0923  Last data filed at 7/6/2019 0758  Gross per 24 hour   Intake 1640 ml   Output 4400 ml   Net -2760 ml     I/O last 3 completed shifts: In: 26 [P O :720; I V :800; Blood:700]  Out: 7300 [Urine:7300]    Urethral Catheter Latex 16 Fr  (Active)   Site Assessment Skin intact; Clean 7/3/2019  8:38 AM   Collection Container Standard drainage bag 7/3/2019  8:38 AM   Securement Method Securing device (Describe) 7/3/2019  8:38 AM   Output (mL) 1200 mL 7/5/2019 12:08 PM       Physical Exam: /73 (BP Location: Right arm)   Pulse 63   Temp 97 5 °F (36 4 °C) (Tympanic)   Resp 20   Ht 5' 8" (1 727 m) Comment: Stated  Wt 69 9 kg (154 lb 1 oz) Comment: Weigh bed  SpO2 99%   BMI 23 43 kg/m²     General Appearance:    Alert, cooperative, no distress, appears stated age Head:    Normocephalic, without obvious abnormality, atraumatic   Eyes:    Conjunctiva/corneas clear   Ears:    Normal external ears   Nose:   Nares normal, septum midline, mucosa normal, no drainage    or sinus tenderness   Throat:   Lips, mucosa, and tongue normal; teeth and gums normal   Neck:   Supple   Back:     Symmetric, no curvature, ROM normal, no CVA tenderness   Lungs:     Clear to auscultation bilaterally, respirations unlabored   Chest wall:    No tenderness or deformity   Heart:    Regular rate and rhythm, S1 and S2 normal, no murmur, rub   or gallop   Abdomen:     Soft, non-tender, bowel sounds active   Extremities:   Extremities normal, atraumatic, no cyanosis or edema   Skin:   Skin color, texture, turgor normal, no rashes or lesions   Lymph nodes:   Cervical normal   Neurologic:   CNII-XII intact            Lab, Imaging and other studies: I have personally reviewed pertinent labs  CBC:   Lab Results   Component Value Date    WBC 8 50 07/06/2019    HGB 10 9 (L) 07/06/2019    HCT 32 4 (L) 07/06/2019    MCV 87 07/06/2019     07/06/2019    MCH 29 3 07/06/2019    MCHC 33 6 07/06/2019    RDW 17 2 (H) 07/06/2019    MPV 8 5 (L) 07/06/2019     CMP:   Lab Results   Component Value Date    K 4 0 07/06/2019     (H) 07/06/2019    CO2 22 07/06/2019    BUN 26 (H) 07/06/2019    CREATININE 1 80 (H) 07/06/2019    CALCIUM 8 4 (L) 07/06/2019    EGFR 35 07/06/2019           Results from last 7 days   Lab Units 07/06/19  0516 07/05/19  0603 07/04/19  1012 07/03/19  0609 07/02/19  0532   POTASSIUM mmol/L 4 0 3 8 4 0 3 9 3 7   CHLORIDE mmol/L 110* 112* 110* 109* 110*   CO2 mmol/L 22 20* 19* 20* 20*   BUN mg/dL 26* 26* 31* 38* 39*   CREATININE mg/dL 1 80* 1 84* 1 92* 2 26* 2 28*   CALCIUM mg/dL 8 4* 8 4* 8 3* 8 6 8 5*   ALK PHOS U/L  --   --  120 80 71   ALT U/L  --   --  9 7 6*   AST U/L  --   --  10* 7* 6*         Phosphorus: No results found for: PHOS  Magnesium: No results found for: MG  Urinalysis: No results found for: Nadara Cornet, SPECGRAV, PHUR, LEUKOCYTESUR, NITRITE, PROTEINUA, GLUCOSEU, KETONESU, BILIRUBINUR, BLOODU  Ionized Calcium: No results found for: CAION  Coagulation: No results found for: PT, INR, APTT  Troponin: No results found for: TROPONINI  ABG: No results found for: PHART, ATN7KJF, PO2ART, OHW1BNT, D2GNSCYY, BEART, SOURCE  Radiology review:     IMAGING  No results found      Current Facility-Administered Medications   Medication Dose Route Frequency    acetaminophen (TYLENOL) tablet 650 mg  650 mg Oral Q4H PRN    amLODIPine (NORVASC) tablet 10 mg  10 mg Oral Daily    aspirin (ECOTRIN LOW STRENGTH) EC tablet 81 mg  81 mg Oral Daily    b complex-vitamin C-folic acid (NEPHROCAPS) capsule 1 capsule  1 capsule Oral Daily With Dinner    colchicine (COLCRYS) tablet 0 3 mg  0 3 mg Oral Daily    epoetin nicko (EPOGEN,PROCRIT) injection 5,000 Units  5,000 Units Intravenous Once per day on Mon Wed Fri    ergocalciferol (VITAMIN D2) capsule 50,000 Units  50,000 Units Oral Weekly    ertapenem (INVanz) 500 mg in sodium chloride 0 9 % 50 mL IVPB  500 mg Intravenous Q24H    glucose chewable tablet 16 g  16 g Oral Once PRN    insulin detemir (LEVEMIR) subcutaneous injection 10 Units  10 Units Subcutaneous HS    insulin lispro (HumaLOG) 100 units/mL subcutaneous injection 1-6 Units  1-6 Units Subcutaneous 4x Daily (PC & HS)    ondansetron (ZOFRAN) injection 4 mg  4 mg Intravenous Q6H PRN    pantoprazole (PROTONIX) EC tablet 40 mg  40 mg Oral Early Morning    simethicone (MYLICON) chewable tablet 80 mg  80 mg Oral Q6H PRN    sulfamethoxazole-trimethoprim (BACTRIM) 400-80 mg per tablet 1 tablet  1 tablet Oral Q12H [address]    tamsulosin (FLOMAX) capsule 0 4 mg  0 4 mg Oral Daily With Dinner     Medications Discontinued During This Encounter   Medication Reason    insulin lispro (HumaLOG) 100 units/mL subcutaneous injection 1-6 Units     insulin lispro (HumaLOG) 100 units/mL subcutaneous injection 1-6 Units     ondansetron (ZOFRAN-ODT) dispersible tablet 4 mg     Cholecalciferol TABS 50,000 Units     potassium chloride 10 % oral solution 20 mEq     ferrous sulfate tablet 325 mg     iron polysaccharides (NIFEREX) capsule 150 mg     sodium chloride 0 9 % infusion        Kristan Howe DO

## 2019-07-07 VITALS
TEMPERATURE: 98.6 F | RESPIRATION RATE: 18 BRPM | SYSTOLIC BLOOD PRESSURE: 133 MMHG | HEART RATE: 65 BPM | BODY MASS INDEX: 23.35 KG/M2 | DIASTOLIC BLOOD PRESSURE: 60 MMHG | HEIGHT: 68 IN | OXYGEN SATURATION: 100 % | WEIGHT: 154.06 LBS

## 2019-07-07 LAB — GLUCOSE SERPL-MCNC: 88 MG/DL (ref 65–140)

## 2019-07-07 PROCEDURE — 82948 REAGENT STRIP/BLOOD GLUCOSE: CPT

## 2019-07-07 RX ADMIN — SODIUM CHLORIDE 500 MG: 9 INJECTION, SOLUTION INTRAVENOUS at 10:02

## 2019-07-07 RX ADMIN — PANTOPRAZOLE SODIUM 40 MG: 40 TABLET, DELAYED RELEASE ORAL at 05:50

## 2019-07-07 RX ADMIN — AMLODIPINE BESYLATE 10 MG: 5 TABLET ORAL at 10:04

## 2019-07-07 RX ADMIN — ASPIRIN 81 MG: 81 TABLET, COATED ORAL at 10:05

## 2019-07-07 RX ADMIN — SULFAMETHOXAZOLE AND TRIMETHOPRIM 1 TABLET: 400; 80 TABLET ORAL at 10:05

## 2019-07-07 RX ADMIN — COLCHICINE 0.3 MG: 0.6 TABLET, FILM COATED ORAL at 10:05

## 2019-07-07 NOTE — PROGRESS NOTES
Progress Note - Nephrology   Franco Carrel [de-identified] y o  male MRN: [id number]  Unit/Bed#: -01 Encounter: [id number]    A/P:  1  Acute renal failure sure what on top of chronic kidney disease             Creatinine remains stable at around 1 8 mg/dL  As mentioned in her yesterday, will continue monitor creatinine however, 1 8 mg/dL may be the patient's new baseline creatinine  2  Chronic kidney disease stage 3a with baseline creatinine likely around 1 4 - 1 5 mg/dL   Patient has not been able to achieve this prior baseline creatinine of 0 9 - 1 mg/dL for some time  3  Probable diabetic nephropathy  4  Proteinuria  5  Neuroendocrine tumor   Continue follow-up and treatment as indicated by Hematology/Oncology  Patient is directed to give a call to the hematologist to note that he has been discharged, continuation of chemotherapeutic as indicated by the care  I also asked that they discuss management of hemoglobin in the outpatient setting, with transfusions versus erythropoietin stimulating agents as indicated  6  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   Continue Haile catheter as recommended by urology  7  ESBL positive urinary tract infection    Likely treatment according to hospitalist   8  Iron deficiency anemia   Patient had 3x 200 mg doses Venofer  9  Anemia of chronic illness/chronic kidney disease   Continue treatment as indicated by Hematology/Oncology      Follow up reason for today's visit:  Acute kidney injury/chronic kidney disease    Weakness generalized    Patient Active Problem List   Diagnosis    Weakness generalized    Type 2 diabetes mellitus with complication, with long-term current use of insulin ([address] )    Essential hypertension    Mixed hyperlipidemia    Cardiac disease    Generalized abdominal mass    Hydroureter    Anemia    Syncope and collapse    Accelerated hypertension    Liver mass    Neuroendocrine tumor    Acute cystitis without hematuria  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)    Acute kidney injury superimposed on chronic kidney disease (HCC)    Acute pain of left knee    Pressure injury of right heel, unstageable (HCC)    Atherosclerosis of artery of extremity with ulceration (HCC)    Carcinoid syndrome (HCC)    Sepsis (HCC)    Gram-negative bacteremia    Left hip pain    Acute cystitis with hematuria    Chronic indwelling Haile catheter    Moderate protein-calorie malnutrition (HCC)    Biliary sludge    Urinary tract infection due to extended-spectrum beta lactamase (ESBL) producing Escherichia coli         Subjective:   No acute events overnight, patient is feeling better overall  Objective:     Vitals: Blood pressure 133/60, pulse 65, temperature 98 6 °F (37 °C), temperature source Tympanic, resp  rate 18, height 5' 8" (1 727 m), weight 69 9 kg (154 lb 1 oz), SpO2 100 %  ,Body mass index is 23 43 kg/m²  Weight (last 2 days)     None            Intake/Output Summary (Last 24 hours) at 7/7/2019 1056  Last data filed at 7/7/2019 0500  Gross per 24 hour   Intake 360 ml   Output 3800 ml   Net -3440 ml     I/O last 3 completed shifts: In: 1520 [P O :720; I V :800]  Out: 6600 [Urine:6600]    Urethral Catheter Latex 16 Fr  (Active)   Site Assessment Skin intact; Clean 7/3/2019  8:38 AM   Collection Container Standard drainage bag 7/3/2019  8:38 AM   Securement Method Securing device (Describe) 7/3/2019  8:38 AM   Output (mL) 1200 mL 7/5/2019 12:08 PM       Physical Exam: /60 (BP Location: Left arm)   Pulse 65   Temp 98 6 °F (37 °C) (Tympanic)   Resp 18   Ht 5' 8" (1 727 m) Comment: Stated  Wt 69 9 kg (154 lb 1 oz) Comment: Weigh bed  SpO2 100%   BMI 23 43 kg/m²     General Appearance:    Alert, cooperative, no distress, appears stated age   Head:    Normocephalic, without obvious abnormality, atraumatic   Eyes:    Conjunctiva/corneas clear   Ears:    Normal external ears   Nose:   Nares normal, septum midline, mucosa normal, no drainage    or sinus tenderness   Throat:   Lips, mucosa, and tongue normal; teeth and gums normal   Neck:   Supple   Back:     Symmetric, no curvature, ROM normal, no CVA tenderness   Lungs:     Clear to auscultation bilaterally, respirations unlabored   Chest wall:    No tenderness or deformity   Heart:    Regular rate and rhythm, S1 and S2 normal, no murmur, rub   or gallop   Abdomen:     Soft, non-tender, bowel sounds active   Extremities:   Extremities normal, atraumatic, no cyanosis or edema   Skin:   Skin color, texture, turgor normal, no rashes or lesions   Lymph nodes:   Cervical normal   Neurologic:   CNII-XII intact            Lab, Imaging and other studies: I have personally reviewed pertinent labs  CBC:   No results found for: WBC, HGB, HCT, MCV, PLT, ADJUSTEDWBC, MCH, MCHC, RDW, MPV, NRBC  CMP:   No results found for: NA, K, CL, CO2, ANIONGAP, BUN, CREATININE, GLUCOSE, CALCIUM, AST, ALT, ALKPHOS, PROT, BILITOT, EGFR      Results from last 7 days   Lab Units 07/06/19  0516 07/05/19  0603 07/04/19  1012 07/03/19  0609 07/02/19  0532   POTASSIUM mmol/L 4 0 3 8 4 0 3 9 3 7   CHLORIDE mmol/L 110* 112* 110* 109* 110*   CO2 mmol/L 22 20* 19* 20* 20*   BUN mg/dL 26* 26* 31* 38* 39*   CREATININE mg/dL 1 80* 1 84* 1 92* 2 26* 2 28*   CALCIUM mg/dL 8 4* 8 4* 8 3* 8 6 8 5*   ALK PHOS U/L  --   --  120 80 71   ALT U/L  --   --  9 7 6*   AST U/L  --   --  10* 7* 6*         Phosphorus: No results found for: PHOS  Magnesium: No results found for: MG  Urinalysis: No results found for: COLORU, CLARITYU, SPECGRAV, PHUR, LEUKOCYTESUR, NITRITE, PROTEINUA, GLUCOSEU, KETONESU, BILIRUBINUR, BLOODU  Ionized Calcium: No results found for: CAION  Coagulation: No results found for: PT, INR, APTT  Troponin: No results found for: TROPONINI  ABG: No results found for: PHART, KPJ5ODV, PO2ART, KFU9NNN, C2AMJURZ, BEART, SOURCE  Radiology review:     IMAGING  No results found      Current Facility-Administered Medications   Medication Dose Route Frequency    acetaminophen (TYLENOL) tablet 650 mg  650 mg Oral Q4H PRN    amLODIPine (NORVASC) tablet 10 mg  10 mg Oral Daily    aspirin (ECOTRIN LOW STRENGTH) EC tablet 81 mg  81 mg Oral Daily    b complex-vitamin C-folic acid (NEPHROCAPS) capsule 1 capsule  1 capsule Oral Daily With Dinner    colchicine (COLCRYS) tablet 0 3 mg  0 3 mg Oral Daily    epoetin nicko (EPOGEN,PROCRIT) injection 5,000 Units  5,000 Units Intravenous Once per day on Mon Wed Fri    ergocalciferol (VITAMIN D2) capsule 50,000 Units  50,000 Units Oral Weekly    ertapenem (INVanz) 500 mg in sodium chloride 0 9 % 50 mL IVPB  500 mg Intravenous Q24H    glucose chewable tablet 16 g  16 g Oral Once PRN    insulin detemir (LEVEMIR) subcutaneous injection 10 Units  10 Units Subcutaneous HS    insulin lispro (HumaLOG) 100 units/mL subcutaneous injection 1-6 Units  1-6 Units Subcutaneous 4x Daily (PC & HS)    ondansetron (ZOFRAN) injection 4 mg  4 mg Intravenous Q6H PRN    pantoprazole (PROTONIX) EC tablet 40 mg  40 mg Oral Early Morning    simethicone (MYLICON) chewable tablet 80 mg  80 mg Oral Q6H PRN    sulfamethoxazole-trimethoprim (BACTRIM) 400-80 mg per tablet 1 tablet  1 tablet Oral Q12H [address]    tamsulosin (FLOMAX) capsule 0 4 mg  0 4 mg Oral Daily With Dinner     Medications Discontinued During This Encounter   Medication Reason    insulin lispro (HumaLOG) 100 units/mL subcutaneous injection 1-6 Units     insulin lispro (HumaLOG) 100 units/mL subcutaneous injection 1-6 Units     ondansetron (ZOFRAN-ODT) dispersible tablet 4 mg     Cholecalciferol TABS 50,000 Units     potassium chloride 10 % oral solution 20 mEq     ferrous sulfate tablet 325 mg     iron polysaccharides (NIFEREX) capsule 150 mg     sodium chloride 0 9 % infusion        Jayme Harmon DO

## 2019-07-08 ENCOUNTER — HOSPITAL ENCOUNTER (OUTPATIENT)
Dept: INFUSION CENTER | Facility: HOSPITAL | Age: 79
Discharge: HOME | End: 2019-07-08

## 2019-07-08 NOTE — UTILIZATION REVIEW
Notification of Discharge  This is a Notification of Discharge from our facility [address]  Please be advised that this patient has been discharge from our facility  Below you will find the admission and discharge date and time including the patients disposition  PRESENTATION DATE: 6/30/2019  6:55 PM  OBS ADMISSION DATE:   IP ADMISSION DATE: 6/30/19 2044   DISCHARGE DATE: 7/7/2019 11:15 AM  [facility]   Admission Orders listed below:  Admission Orders (From admission, onward)    Ordered        06/30/19 2044  Inpatient Admission (expected length of stay for this patient Order details is greater than two midnights)  Once             Please contact the UR Department if additional information is required to close this patient's authorization/case  [address]  Utilization Review Department  Phone: [phone]; Fax [phone]  [email or web address]  ATTENTION: Please call with any questions or concerns to [phone]  and carefully listen to the prompts so that you are directed to the right person  Send all requests for admission clinical reviews, approved or denied determinations and any other requests to fax [phone]   All voicemails are confidential

## 2019-07-10 ENCOUNTER — HOSPITAL ENCOUNTER (OUTPATIENT)
Dept: INFUSION CENTER | Facility: HOSPITAL | Age: 79
Discharge: HOME | End: 2019-07-10
Payer: COMMERCIAL

## 2019-07-10 VITALS
HEART RATE: 80 BPM | SYSTOLIC BLOOD PRESSURE: 138 MMHG | OXYGEN SATURATION: 100 % | TEMPERATURE: 98.2 F | RESPIRATION RATE: 18 BRPM | DIASTOLIC BLOOD PRESSURE: 64 MMHG

## 2019-07-10 DIAGNOSIS — C7B.8 NEUROENDOCRINE CARCINOMA METASTATIC TO LIVER (HCC): ICD-10-CM

## 2019-07-10 DIAGNOSIS — C7A.8 NEUROENDOCRINE CARCINOMA METASTATIC TO LIVER (HCC): ICD-10-CM

## 2019-07-10 DIAGNOSIS — D3A.8 NEUROENDOCRINE TUMOR: ICD-10-CM

## 2019-07-10 DIAGNOSIS — E34.0 CARCINOID SYNDROME (HCC): Primary | ICD-10-CM

## 2019-07-10 PROCEDURE — 96372 THER/PROPH/DIAG INJ SC/IM: CPT

## 2019-07-10 RX ORDER — LANREOTIDE ACETATE 120 MG/.5ML
120 INJECTION SUBCUTANEOUS ONCE
Status: CANCELLED | OUTPATIENT
Start: 2019-07-10

## 2019-07-10 RX ORDER — LANREOTIDE ACETATE 120 MG/.5ML
120 INJECTION SUBCUTANEOUS ONCE
Status: CANCELLED | OUTPATIENT
Start: 2019-08-09

## 2019-07-10 RX ORDER — METHENAMINE HIPPURATE 1000 MG/1
1 TABLET ORAL 3 TIMES DAILY
COMMUNITY
End: 2020-02-28 | Stop reason: SDUPTHER

## 2019-07-10 RX ORDER — LANREOTIDE ACETATE 120 MG/.5ML
120 INJECTION SUBCUTANEOUS ONCE
Status: COMPLETED | OUTPATIENT
Start: 2019-07-10 | End: 2019-07-10

## 2019-07-10 RX ADMIN — LANREOTIDE ACETATE 120 MG: 120 INJECTION SUBCUTANEOUS at 10:40

## 2019-07-10 NOTE — PLAN OF CARE
Problem: Potential for Falls  Goal: Patient will remain free of falls  Description  INTERVENTIONS:  - Assess patient frequently for physical needs  -  Identify cognitive and physical deficits and behaviors that affect risk of falls    -  [place] fall precautions as indicated by assessment   - Educate patient/family on patient safety including physical limitations  - Instruct patient to call for assistance with activity based on assessment  - Modify environment to reduce risk of injury  - Consider OT/PT consult to assist with strengthening/mobility  Outcome: Progressing

## 2019-07-15 ENCOUNTER — TELEPHONE (OUTPATIENT)
Dept: INFECTIOUS DISEASES | Facility: CLINIC | Age: 79
End: 2019-07-15

## 2019-07-15 DIAGNOSIS — N39.0 RECURRENT UTI: Primary | ICD-10-CM

## 2019-07-15 NOTE — TELEPHONE ENCOUNTER
Contacted pt per Dr Sondra Draper for pt to have repeat urine cx to verify clearance of infection  At pt's request, will mail to his home  I did verify patients street address  Informed him we would contact him upon receipt of results and that he could get the culture done at his earliest convenience  Pt verbalizes understanding

## 2019-07-16 PROBLEM — C7A.8 MALIGNANT NEUROENDOCRINE NEOPLASM (HCC): Status: ACTIVE | Noted: 2019-07-16

## 2019-07-22 ENCOUNTER — TRANSCRIBE ORDERS (OUTPATIENT)
Dept: LAB | Facility: CLINIC | Age: 79
End: 2019-07-22

## 2019-07-22 ENCOUNTER — APPOINTMENT (OUTPATIENT)
Dept: LAB | Facility: CLINIC | Age: 79
End: 2019-07-22
Payer: COMMERCIAL

## 2019-07-22 DIAGNOSIS — N39.0 RECURRENT UTI: ICD-10-CM

## 2019-07-22 PROCEDURE — 87077 CULTURE AEROBIC IDENTIFY: CPT

## 2019-07-22 PROCEDURE — 87186 SC STD MICRODIL/AGAR DIL: CPT

## 2019-07-22 PROCEDURE — 87086 URINE CULTURE/COLONY COUNT: CPT

## 2019-07-23 ENCOUNTER — OFFICE VISIT (OUTPATIENT)
Dept: HEMATOLOGY ONCOLOGY | Facility: CLINIC | Age: 79
End: 2019-07-23
Payer: COMMERCIAL

## 2019-07-23 VITALS
TEMPERATURE: 97.4 F | BODY MASS INDEX: 23.16 KG/M2 | WEIGHT: 152.3 LBS | OXYGEN SATURATION: 97 % | DIASTOLIC BLOOD PRESSURE: 74 MMHG | SYSTOLIC BLOOD PRESSURE: 142 MMHG | HEART RATE: 71 BPM | RESPIRATION RATE: 16 BRPM

## 2019-07-23 DIAGNOSIS — D3A.8 NEUROENDOCRINE TUMOR: Primary | Chronic | ICD-10-CM

## 2019-07-23 DIAGNOSIS — D64.9 ANEMIA, UNSPECIFIED TYPE: ICD-10-CM

## 2019-07-23 PROCEDURE — 99215 OFFICE O/P EST HI 40 MIN: CPT | Performed by: INTERNAL MEDICINE

## 2019-07-23 NOTE — PROGRESS NOTES
Hematology/Oncology Outpatient Follow-up  Ricci Cuellar [de-identified] y o  male 1940 [id number]    Date:  7/23/2019        Assessment and Plan:  1  Neuroendocrine tumor  Low-grade metastatic neuroendocrine tumor on Lanreotide injections which will be continued on a monthly basis  The patient stated that his diarrhea seems to be under control which is a good sign that his metastatic disease is under control  We will check his chromogranin a level prior to his next visit  - CBC and differential; Future  - Comprehensive metabolic panel; Future  - Chromogranin A; Future  - CBC and differential; Future  - Comprehensive metabolic panel; Future  - Chromogranin A; Future    2  Anemia, unspecified type  During the hospital stay the patient stated that he had iron IV treatment  His hemoglobin level is close to 11 g at the most recent lab work from July  There is no role for erythropoietin products or blood transfusion with hemoglobin close to 11 g  We will recheck his iron panel vitamin B12 level prior to his next visit  - Iron Panel; Future  - Ferritin; Future  - Vitamin B12; Future        HPI:  This is a 48-year-old gentleman with multiple comorbid conditions including history of coronary artery disease, diabetes mellitus, hypertension, hyperlipidemia, and more recently metastatic neuroendocrine tumor  The patient was seen in consultation when he was in the hospital on the 5th February 2019 at that time he had significant weakness status post vasovagal syncope   He was evaluated with a CT scan of the abdomen and pelvis on the 23rd January 2019 which showed partially calcified mesenteric mass with multiple liver lesions compatible most likely with carcinoid malignancy   He also was found to have bilateral hydronephrosis and severe bladder wall thickening with prostatomegaly    The patient then had a core biopsy from 1 of the liver lesions on the 24th of January which showed well-differentiated neuroendocrine tumor grade 1-2 with Ki 67 of 3-5%   His chromogranin level on the 6 of February was 273   His 24 hour urine for HIAA was 48 which is above normal   The patient was started on Lanreotide in March 2019  The patient unfortunately had to be admitted to the hospital due to significant urinary tract infection  He continues to have Haile catheter in  His most recent Lanreotide injection was on the 10th of July  He stated that his diarrhea is much better  He did not have a chromogranin a level for a while since he did not follow up with us since May  His most recent available blood work showed a CBC on the 6 of July with hemoglobin of 10 9 otherwise normal white cells and platelets  He denies bleeding from any sites  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 ([facility] [address] )    1/24/2019 Initial Diagnosis     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 )         Interval history:    ROS: Review of Systems   Constitutional: Positive for fatigue  Negative for appetite change, diaphoresis and fever  HENT: Negative for congestion, dental problem, facial swelling, hearing loss, tinnitus and trouble swallowing  Eyes: Negative for visual disturbance  Respiratory: Positive for shortness of breath  Negative for cough, chest tightness and wheezing  Cardiovascular: Negative for chest pain and leg swelling  Gastrointestinal: Negative for abdominal distention, abdominal pain, blood in stool, constipation, diarrhea, nausea and vomiting  Genitourinary: Positive for dysuria  Negative for hematuria and urgency  Musculoskeletal: Negative for arthralgias, myalgias and neck pain  Skin: Negative  Negative for color change, pallor, rash and wound  Neurological: Negative for dizziness, weakness, numbness and headaches  Hematological: Negative for adenopathy  Psychiatric/Behavioral: Negative for agitation, behavioral problems, confusion, hallucinations, self-injury and sleep disturbance   The patient is not nervous/anxious and is not hyperactive  Past Medical History:   Diagnosis Date    BPH (benign prostatic hyperplasia)     Cancer ([facility] )     liver cancer    Cardiac disease     Coronary artery disease     Diabetes mellitus ([facility] )     DJD (degenerative joint disease)     Gait disturbance     Generalized weakness     GERD (gastroesophageal reflux disease)     Gout     Hyperlipidemia     Hypertension     Renal disorder        Past Surgical History:   Procedure Laterality Date    CORONARY ANGIOPLASTY WITH STENT PLACEMENT      IR IMAGE GUIDED BIOPSY/ASPIRATION LIVER  1/24/2019       Social History     Socioeconomic History    Marital status:       Spouse name: None    Number of children: None    Years of education: None    Highest education level: None   Occupational History    None   Social Needs    Financial resource strain: None    Food insecurity:     Worry: None     Inability: None    Transportation needs:     Medical: None     Non-medical: None   Tobacco Use    Smoking status: Never Smoker    Smokeless tobacco: Never Used   Substance and Sexual Activity    Alcohol use: Never     Frequency: Never    Drug use: Never    Sexual activity: Never   Lifestyle    Physical activity:     Days per week: None     Minutes per session: None    Stress: None   Relationships    Social connections:     Talks on phone: None     Gets together: None     Attends [employer] service: None     Active member of club or organization: None     Attends meetings of clubs or organizations: None     Relationship status: None    Intimate partner violence:     Fear of current or ex partner: None     Emotionally abused: None     Physically abused: None     Forced sexual activity: None   Other Topics Concern    None   Social History Narrative    None       Family History   Problem Relation Age of Onset    Cancer Mother     Hypertension Father     Cancer Brother     Cancer Maternal Grandmother     Cancer Paternal Aunt No Known Allergies      Current Outpatient Medications:     aspirin 81 MG tablet, Take 81 mg by mouth daily, Disp: , Rfl:     b complex-vitamin C-folic acid (NEPHROCAPS) 1 mg capsule, Take 1 capsule by mouth daily with dinner, Disp: 60 capsule, Rfl: 0    cholecalciferol (VITAMIN D3) 1,000 units tablet, Take 1,000 Units by mouth daily, Disp: , Rfl:     colchicine (COLCRYS) 0 6 mg tablet, Take 0 6 mg by mouth daily, Disp: , Rfl:     ferrous sulfate 325 (65 Fe) mg tablet, Take 325 mg by mouth daily with breakfast, Disp: , Rfl:     insulin detemir (LEVEMIR) 100 units/mL subcutaneous injection, Inject 10 Units under the skin daily at bedtime, Disp: , Rfl:     insulin lispro (HumaLOG) 100 units/mL injection, Inject under the skin 3 (three) times a day before meals, Disp: , Rfl:     methenamine hippurate (HIPREX) 1 g tablet, Take 1 g by mouth 2 (two) times a day with meals, Disp: , Rfl:     pantoprazole (PROTONIX) 40 mg tablet, Take 1 tablet (40 mg total) by mouth daily in the early morning, Disp: , Rfl: 0    tamsulosin (FLOMAX) 0 4 mg, Take 1 capsule (0 4 mg total) by mouth daily with dinner, Disp: , Rfl: 0    amLODIPine (NORVASC) 2 5 mg tablet, Take 4 tablets (10 mg total) by mouth daily for 30 days, Disp: 90 tablet, Rfl: 0      Physical Exam:  /74 (BP Location: Left arm, Cuff Size: Adult)   Pulse 71   Temp (!) 97 4 °F (36 3 °C) (Tympanic)   Resp 16   Wt 69 1 kg (152 lb 4 8 oz)   SpO2 97%   BMI 23 16 kg/m²     Physical Exam   Constitutional: He is oriented to person, place, and time  He appears well-developed and well-nourished  HENT:   Head: Normocephalic and atraumatic  Nose: Nose normal    Mouth/Throat: Oropharynx is clear and moist    Eyes: Pupils are equal, round, and reactive to light  Conjunctivae and EOM are normal  Right eye exhibits no discharge  Left eye exhibits no discharge  No scleral icterus  Neck: Normal range of motion  Neck supple  No tracheal deviation present   No thyromegaly present  Cardiovascular: Normal rate, regular rhythm and normal heart sounds  Exam reveals no friction rub  No murmur heard  Pulmonary/Chest: Effort normal and breath sounds normal  No respiratory distress  He has no wheezes  He has no rales  He exhibits no tenderness  Abdominal: Soft  Bowel sounds are normal  He exhibits no distension and no mass  There is no hepatosplenomegaly, splenomegaly or hepatomegaly  There is no tenderness  There is no rebound and no guarding  Genitourinary:   Genitourinary Comments: Haile catheter in place with pus in the urine   Musculoskeletal: Normal range of motion  He exhibits no edema, tenderness or deformity  Ambulatory dysfunction using the walker   Lymphadenopathy:     He has no cervical adenopathy  Neurological: He is alert and oriented to person, place, and time  He has normal reflexes  He displays normal reflexes  No cranial nerve deficit  Coordination normal    Skin: Skin is warm and dry  No rash noted  No erythema  No pallor  Psychiatric: He has a normal mood and affect  His behavior is normal  Judgment and thought content normal          Labs:  Lab Results   Component Value Date    WBC 8 50 07/06/2019    HGB 10 9 (L) 07/06/2019    HCT 32 4 (L) 07/06/2019    MCV 87 07/06/2019     07/06/2019     Lab Results   Component Value Date    K 4 0 07/06/2019     (H) 07/06/2019    CO2 22 07/06/2019    BUN 26 (H) 07/06/2019    CREATININE 1 80 (H) 07/06/2019    GLUF 90 06/25/2019    CALCIUM 8 4 (L) 07/06/2019    AST 10 (L) 07/04/2019    ALT 9 07/04/2019    ALKPHOS 120 07/04/2019    EGFR 35 07/06/2019     No results found for: TSH    Patient voiced understanding and agreement in the above discussion  Aware to contact our office with questions/symptoms in the interim

## 2019-07-24 ENCOUNTER — TELEPHONE (OUTPATIENT)
Dept: INFECTIOUS DISEASES | Facility: CLINIC | Age: 79
End: 2019-07-24

## 2019-07-24 DIAGNOSIS — A49.8 INFECTION DUE TO ESBL-PRODUCING ESCHERICHIA COLI: Primary | ICD-10-CM

## 2019-07-24 DIAGNOSIS — B96.29 URINARY TRACT INFECTION DUE TO EXTENDED-SPECTRUM BETA LACTAMASE (ESBL) PRODUCING ESCHERICHIA COLI: Primary | ICD-10-CM

## 2019-07-24 DIAGNOSIS — N39.0 URINARY TRACT INFECTION DUE TO EXTENDED-SPECTRUM BETA LACTAMASE (ESBL) PRODUCING ESCHERICHIA COLI: Primary | ICD-10-CM

## 2019-07-24 DIAGNOSIS — Z16.12 URINARY TRACT INFECTION DUE TO EXTENDED-SPECTRUM BETA LACTAMASE (ESBL) PRODUCING ESCHERICHIA COLI: Primary | ICD-10-CM

## 2019-07-24 DIAGNOSIS — Z16.12 INFECTION DUE TO ESBL-PRODUCING ESCHERICHIA COLI: Primary | ICD-10-CM

## 2019-07-24 LAB — BACTERIA UR CULT: ABNORMAL

## 2019-07-24 RX ORDER — GRANULES FOR ORAL 3 G/1
3 POWDER ORAL EVERY OTHER DAY
Qty: 9 G | Refills: 0 | Status: SHIPPED | OUTPATIENT
Start: 2019-07-24 | End: 2019-07-29

## 2019-07-24 NOTE — TELEPHONE ENCOUNTER
I left  for pt to call back to our office to discuss  Medication was sent to pt's pharmacy and urine culture order was placed in Epic      ----- Message from Onofre Pedro sent at 7/24/2019  9:10 AM EDT -----      ----- Message -----  From: Yenifer Ardon MD  Sent: 7/24/2019   8:57 AM EDT  To: Infectious Disease [facility]    Patient's repeat urine culture is once again positive for ESBL E coli  Given patient multiple recent hospitalizations for sepsis due to ESBL E coli UTI and/orbacteremia, do recommend treating with fosfomycin 3 g every 2 days for 3 doses (order placed)  Recommend repeating urine culture 1 week after completing antibiotics

## 2019-08-05 ENCOUNTER — APPOINTMENT (OUTPATIENT)
Dept: LAB | Facility: CLINIC | Age: 79
End: 2019-08-05
Payer: COMMERCIAL

## 2019-08-05 ENCOUNTER — TRANSCRIBE ORDERS (OUTPATIENT)
Dept: LAB | Facility: CLINIC | Age: 79
End: 2019-08-05

## 2019-08-05 DIAGNOSIS — D64.9 ANEMIA, UNSPECIFIED TYPE: ICD-10-CM

## 2019-08-05 DIAGNOSIS — D3A.8 NEUROENDOCRINE TUMOR: Chronic | ICD-10-CM

## 2019-08-05 LAB
ALBUMIN SERPL BCP-MCNC: 3.7 G/DL (ref 3.5–5)
ALP SERPL-CCNC: 112 U/L (ref 46–116)
ALT SERPL W P-5'-P-CCNC: 21 U/L (ref 12–78)
ANION GAP SERPL CALCULATED.3IONS-SCNC: 9 MMOL/L (ref 4–13)
AST SERPL W P-5'-P-CCNC: 9 U/L (ref 5–45)
BASOPHILS # BLD AUTO: 0.07 THOUSANDS/ΜL (ref 0–0.1)
BASOPHILS NFR BLD AUTO: 1 % (ref 0–1)
BILIRUB SERPL-MCNC: 0.41 MG/DL (ref 0.2–1)
BUN SERPL-MCNC: 30 MG/DL (ref 5–25)
CALCIUM SERPL-MCNC: 9 MG/DL (ref 8.3–10.1)
CHLORIDE SERPL-SCNC: 106 MMOL/L (ref 100–108)
CO2 SERPL-SCNC: 25 MMOL/L (ref 21–32)
CREAT SERPL-MCNC: 1.93 MG/DL (ref 0.6–1.3)
EOSINOPHIL # BLD AUTO: 0.15 THOUSAND/ΜL (ref 0–0.61)
EOSINOPHIL NFR BLD AUTO: 2 % (ref 0–6)
ERYTHROCYTE [DISTWIDTH] IN BLOOD BY AUTOMATED COUNT: 16.9 % (ref 11.6–15.1)
FERRITIN SERPL-MCNC: 617 NG/ML (ref 8–388)
GFR SERPL CREATININE-BSD FRML MDRD: 32 ML/MIN/1.73SQ M
GLUCOSE P FAST SERPL-MCNC: 346 MG/DL (ref 65–99)
HCT VFR BLD AUTO: 37.4 % (ref 36.5–49.3)
HGB BLD-MCNC: 11.8 G/DL (ref 12–17)
IMM GRANULOCYTES # BLD AUTO: 0.02 THOUSAND/UL (ref 0–0.2)
IMM GRANULOCYTES NFR BLD AUTO: 0 % (ref 0–2)
IRON SATN MFR SERPL: 53 %
IRON SERPL-MCNC: 111 UG/DL (ref 65–175)
LYMPHOCYTES # BLD AUTO: 0.97 THOUSANDS/ΜL (ref 0.6–4.47)
LYMPHOCYTES NFR BLD AUTO: 13 % (ref 14–44)
MCH RBC QN AUTO: 29.5 PG (ref 26.8–34.3)
MCHC RBC AUTO-ENTMCNC: 31.6 G/DL (ref 31.4–37.4)
MCV RBC AUTO: 94 FL (ref 82–98)
MONOCYTES # BLD AUTO: 0.42 THOUSAND/ΜL (ref 0.17–1.22)
MONOCYTES NFR BLD AUTO: 6 % (ref 4–12)
NEUTROPHILS # BLD AUTO: 5.98 THOUSANDS/ΜL (ref 1.85–7.62)
NEUTS SEG NFR BLD AUTO: 78 % (ref 43–75)
NRBC BLD AUTO-RTO: 0 /100 WBCS
PLATELET # BLD AUTO: 152 THOUSANDS/UL (ref 149–390)
PMV BLD AUTO: 12.5 FL (ref 8.9–12.7)
POTASSIUM SERPL-SCNC: 4.7 MMOL/L (ref 3.5–5.3)
PROT SERPL-MCNC: 7 G/DL (ref 6.4–8.2)
RBC # BLD AUTO: 4 MILLION/UL (ref 3.88–5.62)
SODIUM SERPL-SCNC: 140 MMOL/L (ref 136–145)
TIBC SERPL-MCNC: 210 UG/DL (ref 250–450)
VIT B12 SERPL-MCNC: 1472 PG/ML (ref 100–900)
WBC # BLD AUTO: 7.61 THOUSAND/UL (ref 4.31–10.16)

## 2019-08-05 PROCEDURE — 86316 IMMUNOASSAY TUMOR OTHER: CPT

## 2019-08-05 PROCEDURE — 82728 ASSAY OF FERRITIN: CPT

## 2019-08-05 PROCEDURE — 82607 VITAMIN B-12: CPT

## 2019-08-05 PROCEDURE — 80053 COMPREHEN METABOLIC PANEL: CPT

## 2019-08-05 PROCEDURE — 36415 COLL VENOUS BLD VENIPUNCTURE: CPT

## 2019-08-05 PROCEDURE — 85025 COMPLETE CBC W/AUTO DIFF WBC: CPT

## 2019-08-05 PROCEDURE — 83540 ASSAY OF IRON: CPT

## 2019-08-05 PROCEDURE — 83550 IRON BINDING TEST: CPT

## 2019-08-09 ENCOUNTER — HOSPITAL ENCOUNTER (OUTPATIENT)
Dept: INFUSION CENTER | Facility: HOSPITAL | Age: 79
Discharge: HOME | End: 2019-08-09
Payer: COMMERCIAL

## 2019-08-09 VITALS
TEMPERATURE: 96.2 F | DIASTOLIC BLOOD PRESSURE: 72 MMHG | OXYGEN SATURATION: 98 % | HEART RATE: 71 BPM | RESPIRATION RATE: 16 BRPM | SYSTOLIC BLOOD PRESSURE: 158 MMHG

## 2019-08-09 DIAGNOSIS — E34.0 CARCINOID SYNDROME (HCC): Primary | ICD-10-CM

## 2019-08-09 DIAGNOSIS — C7B.8 NEUROENDOCRINE CARCINOMA METASTATIC TO LIVER (HCC): ICD-10-CM

## 2019-08-09 DIAGNOSIS — C7A.8 MALIGNANT NEUROENDOCRINE NEOPLASM (HCC): ICD-10-CM

## 2019-08-09 DIAGNOSIS — D3A.8 NEUROENDOCRINE TUMOR: ICD-10-CM

## 2019-08-09 DIAGNOSIS — C7A.8 NEUROENDOCRINE CARCINOMA METASTATIC TO LIVER (HCC): ICD-10-CM

## 2019-08-09 LAB — CGA SERPL-SCNC: 371 NMOL/L (ref 0–5)

## 2019-08-09 PROCEDURE — 96372 THER/PROPH/DIAG INJ SC/IM: CPT

## 2019-08-09 RX ORDER — LANREOTIDE ACETATE 120 MG/.5ML
120 INJECTION SUBCUTANEOUS ONCE
Status: COMPLETED | OUTPATIENT
Start: 2019-08-09 | End: 2019-08-09

## 2019-08-09 RX ORDER — LANREOTIDE ACETATE 120 MG/.5ML
120 INJECTION SUBCUTANEOUS ONCE
Status: CANCELLED | OUTPATIENT
Start: 2019-09-06

## 2019-08-09 RX ADMIN — LANREOTIDE ACETATE 120 MG: 120 INJECTION SUBCUTANEOUS at 10:36

## 2019-09-02 ENCOUNTER — DOCUMENTATION (OUTPATIENT)
Dept: HEMATOLOGY ONCOLOGY | Facility: CLINIC | Age: 79
End: 2019-09-02

## 2019-09-03 ENCOUNTER — APPOINTMENT (OUTPATIENT)
Dept: LAB | Facility: CLINIC | Age: 79
End: 2019-09-03
Payer: COMMERCIAL

## 2019-09-03 ENCOUNTER — TELEPHONE (OUTPATIENT)
Dept: HEMATOLOGY ONCOLOGY | Facility: CLINIC | Age: 79
End: 2019-09-03

## 2019-09-03 ENCOUNTER — OFFICE VISIT (OUTPATIENT)
Dept: HEMATOLOGY ONCOLOGY | Facility: CLINIC | Age: 79
End: 2019-09-03
Payer: COMMERCIAL

## 2019-09-03 VITALS
OXYGEN SATURATION: 95 % | HEART RATE: 88 BPM | RESPIRATION RATE: 16 BRPM | WEIGHT: 157.3 LBS | SYSTOLIC BLOOD PRESSURE: 130 MMHG | DIASTOLIC BLOOD PRESSURE: 66 MMHG | TEMPERATURE: 98.6 F | BODY MASS INDEX: 23.92 KG/M2

## 2019-09-03 DIAGNOSIS — D64.9 ANEMIA, UNSPECIFIED TYPE: ICD-10-CM

## 2019-09-03 DIAGNOSIS — E34.0 CARCINOID SYNDROME (HCC): ICD-10-CM

## 2019-09-03 DIAGNOSIS — C7B.8 NEUROENDOCRINE CARCINOMA METASTATIC TO LIVER (HCC): Primary | ICD-10-CM

## 2019-09-03 DIAGNOSIS — C7A.8 NEUROENDOCRINE CARCINOMA METASTATIC TO LIVER (HCC): Primary | ICD-10-CM

## 2019-09-03 LAB
ALBUMIN SERPL BCP-MCNC: 3.6 G/DL (ref 3.5–5)
ALP SERPL-CCNC: 125 U/L (ref 46–116)
ALT SERPL W P-5'-P-CCNC: 15 U/L (ref 12–78)
ANION GAP SERPL CALCULATED.3IONS-SCNC: 10 MMOL/L (ref 4–13)
AST SERPL W P-5'-P-CCNC: 8 U/L (ref 5–45)
BASOPHILS # BLD AUTO: 0.06 THOUSANDS/ΜL (ref 0–0.1)
BASOPHILS NFR BLD AUTO: 1 % (ref 0–1)
BILIRUB SERPL-MCNC: 0.5 MG/DL (ref 0.2–1)
BUN SERPL-MCNC: 47 MG/DL (ref 5–25)
CALCIUM SERPL-MCNC: 9.5 MG/DL (ref 8.3–10.1)
CHLORIDE SERPL-SCNC: 113 MMOL/L (ref 100–108)
CO2 SERPL-SCNC: 18 MMOL/L (ref 21–32)
CREAT SERPL-MCNC: 2.72 MG/DL (ref 0.6–1.3)
EOSINOPHIL # BLD AUTO: 0.08 THOUSAND/ΜL (ref 0–0.61)
EOSINOPHIL NFR BLD AUTO: 1 % (ref 0–6)
ERYTHROCYTE [DISTWIDTH] IN BLOOD BY AUTOMATED COUNT: 15 % (ref 11.6–15.1)
GFR SERPL CREATININE-BSD FRML MDRD: 21 ML/MIN/1.73SQ M
GLUCOSE SERPL-MCNC: 230 MG/DL (ref 65–140)
HCT VFR BLD AUTO: 35 % (ref 36.5–49.3)
HGB BLD-MCNC: 11.6 G/DL (ref 12–17)
IMM GRANULOCYTES # BLD AUTO: 0.04 THOUSAND/UL (ref 0–0.2)
IMM GRANULOCYTES NFR BLD AUTO: 0 % (ref 0–2)
LYMPHOCYTES # BLD AUTO: 0.98 THOUSANDS/ΜL (ref 0.6–4.47)
LYMPHOCYTES NFR BLD AUTO: 10 % (ref 14–44)
MCH RBC QN AUTO: 29.1 PG (ref 26.8–34.3)
MCHC RBC AUTO-ENTMCNC: 33.1 G/DL (ref 31.4–37.4)
MCV RBC AUTO: 88 FL (ref 82–98)
MONOCYTES # BLD AUTO: 0.9 THOUSAND/ΜL (ref 0.17–1.22)
MONOCYTES NFR BLD AUTO: 10 % (ref 4–12)
NEUTROPHILS # BLD AUTO: 7.37 THOUSANDS/ΜL (ref 1.85–7.62)
NEUTS SEG NFR BLD AUTO: 78 % (ref 43–75)
NRBC BLD AUTO-RTO: 0 /100 WBCS
PLATELET # BLD AUTO: 214 THOUSANDS/UL (ref 149–390)
PMV BLD AUTO: 11.2 FL (ref 8.9–12.7)
POTASSIUM SERPL-SCNC: 4.5 MMOL/L (ref 3.5–5.3)
PROT SERPL-MCNC: 7.9 G/DL (ref 6.4–8.2)
RBC # BLD AUTO: 3.98 MILLION/UL (ref 3.88–5.62)
SODIUM SERPL-SCNC: 141 MMOL/L (ref 136–145)
WBC # BLD AUTO: 9.43 THOUSAND/UL (ref 4.31–10.16)

## 2019-09-03 PROCEDURE — 36415 COLL VENOUS BLD VENIPUNCTURE: CPT

## 2019-09-03 PROCEDURE — 85025 COMPLETE CBC W/AUTO DIFF WBC: CPT

## 2019-09-03 PROCEDURE — 99214 OFFICE O/P EST MOD 30 MIN: CPT | Performed by: NURSE PRACTITIONER

## 2019-09-03 PROCEDURE — 80053 COMPREHEN METABOLIC PANEL: CPT

## 2019-09-03 NOTE — TELEPHONE ENCOUNTER
Spoke to Bashir Lindsey and gave her instruction to hold iron pills for Shelia Byrdrobby, she verbalized understanding

## 2019-09-03 NOTE — PROGRESS NOTES
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 [facility])  The patient will be continued on his Lanreotide injections 120 mg every 4 weeks for his low-grade metastatic neuroendocrine tumor/carcinoid syndrome  He is due for another injection on Friday 09/06/2019  Will continue to monitor him closely every 4-6 weeks while he is on treatment  We will repeat a chromogranin a level prior to his next follow-up  - CBC and differential; Future  - Comprehensive metabolic panel; Future  - C-reactive protein; Future  - Sedimentation rate, automated; Future  - Chromogranin A; Future    2  Carcinoid syndrome ([address] )  As above  The patient and his wife were educated about carcinoid syndrome in general and common triggers including diet, stress, alcohol, and heavy exercise/exertion  They were provided with additional educational information regarding triggers and what to avoid to prevent flare  3  Anemia, unspecified type  Patient's anemia is multifactorial including anemia of chronic renal disease/anemia of chronic disease  The patient's most recent blood work from approximately 1 month ago showed stable anemia with a hemoglobin of 11 8  I have asked patient to get a CBC and CMP done today after the visit for close monitoring of his anemia which we will review and address once available  His most recent transferrin saturation was is elevated at 53% with elevated ferritin of 617  He has a history of elevated ferritin may be inflammatory in nature but will need to be monitored closely for iron overload, he has history of blood transfusion recently July of this year  I have asked him to discontinue his oral iron supplement  We will repeat his iron panel prior to his next follow-up visit for close monitoring     - CBC and differential; Future  - Comprehensive metabolic panel;  Future  - Iron Panel (Includes Ferritin, Iron Sat%, Iron, and TIBC); Future  - Ferritin; Future    HPI:  The patient presents today for a follow-up visit accompanied by his wife  He continues to follow with Infectious Disease for recurrent resistant UTIs and just completed another course of antibiotics on Sunday  He has a Haile catheter and was told that this will be permanent  He denies any fever or dysuria at present  The patient continues to get his monthly Lanreotide injections for his neuroendocrine tumor/carcinoid syndrome which he is tolerating well  The patient states that he is no longer experiencing diarrhea since starting his injections  Denies any chest pain, palpitations, rash, poor appetite, or increasing dyspnea  He does note some facial flushing with exertion at times  His most recent laboratory studies from approximately 1 month ago on the 5th of August showed normal WBC 7 6, stable normocytic anemia H&H 11 8/37 4 MCV 94 to somewhat improved from prior a normal platelet count 638  He continues to have stable chronic renal dysfunction BUN 30, creatinine 1 93, GFR 32 glucose is elevated 346 remaining chemistry is within normal limits  His B12 was 1472, transferrin saturation is somewhat elevated 53% TIBC 210 serum iron was 111, ferritin elevated 617  His chromogranin A level was 371 which appears to be decreased from 6/2019  Oncology History    Patient has multiple comorbid conditions including history of coronary artery disease, diabetes mellitus, hypertension, hyperlipidemia, and more recently metastatic neuroendocrine tumor       The patient was seen in consultation when he was in the hospital on the 5th February 2019 at that time he had significant weakness status post vasovagal syncope   He was evaluated with a CT scan of the abdomen and pelvis on the 23rd January 2019 which showed partially calcified mesenteric mass with multiple liver lesions compatible most likely with carcinoid malignancy  [facility] also was found to have bilateral hydronephrosis and severe bladder wall thickening with prostatomegaly  The patient then had a core biopsy from 1 of the liver lesions on the 24th of January which showed well-differentiated neuroendocrine tumor grade 1-2 with Ki 67 of 3-5%       His chromogranin level  February 2019 was 273   His 24 hour urine for HIAA was 48 which is above normal   The patient was started on Lanreotide in March 2019  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 ([facility] [place] [facility] )    1/24/2019 Initial Diagnosis     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 ([facility] [place] [facility] )         Interval history:    ROS: Review of Systems   Constitutional: Positive for fatigue  Negative for activity change, appetite change, chills, fever and unexpected weight change  HENT: Negative for congestion, mouth sores, nosebleeds, sore throat and trouble swallowing  Eyes: Negative  Respiratory: Positive for shortness of breath (Chronic exertion)  Negative for cough and chest tightness  Cardiovascular: Negative for chest pain, palpitations and leg swelling  Gastrointestinal: Negative for abdominal distention, abdominal pain, blood in stool, constipation, diarrhea, nausea and vomiting  Genitourinary: Negative for difficulty urinating, dysuria, frequency, hematuria and urgency  Haile catheter   Musculoskeletal: Negative for arthralgias, back pain, gait problem, joint swelling and myalgias  Walks with a walker   Skin: Negative for color change, pallor and rash  Neurological: Positive for numbness ( and tingling)  Negative for dizziness, weakness, light-headedness and headaches  Hematological: Negative for adenopathy  Does not bruise/bleed easily  Psychiatric/Behavioral: Negative for dysphoric mood and sleep disturbance  The patient is not nervous/anxious          Past Medical History:   Diagnosis Date    BPH (benign prostatic hyperplasia)     Cancer ([facility] [place] [facility] )     liver cancer    Cardiac disease     Coronary artery disease     Diabetes mellitus (HCC)     DJD (degenerative joint disease)     Gait disturbance     Generalized weakness     GERD (gastroesophageal reflux disease)     Gout     Hyperlipidemia     Hypertension     Renal disorder        Past Surgical History:   Procedure Laterality Date    CORONARY ANGIOPLASTY WITH STENT PLACEMENT      IR IMAGE GUIDED BIOPSY/ASPIRATION LIVER  1/24/2019       Social History     Socioeconomic History    Marital status:       Spouse name: None    Number of children: None    Years of education: None    Highest education level: None   Occupational History    None   Social Needs    Financial resource strain: None    Food insecurity:     Worry: None     Inability: None    Transportation needs:     Medical: None     Non-medical: None   Tobacco Use    Smoking status: Never Smoker    Smokeless tobacco: Never Used   Substance and Sexual Activity    Alcohol use: Never     Frequency: Never    Drug use: Never    Sexual activity: Never   Lifestyle    Physical activity:     Days per week: None     Minutes per session: None    Stress: None   Relationships    Social connections:     Talks on phone: None     Gets together: None     Attends Mandaen service: None     Active member of club or organization: None     Attends meetings of clubs or organizations: None     Relationship status: None    Intimate partner violence:     Fear of current or ex partner: None     Emotionally abused: None     Physically abused: None     Forced sexual activity: None   Other Topics Concern    None   Social History Narrative    None       Family History   Problem Relation Age of Onset    Cancer Mother     Hypertension Father     Cancer Brother     Cancer Maternal Grandmother     Cancer Paternal Aunt        No Known Allergies      Current Outpatient Medications:     aspirin 81 MG tablet, Take 81 mg by mouth daily, Disp: , Rfl:     b complex-vitamin C-folic acid (NEPHROCAPS) 1 mg capsule, Take 1 capsule by mouth daily with dinner, Disp: 60 capsule, Rfl: 0    cholecalciferol (VITAMIN D3) 1,000 units tablet, Take 1,000 Units by mouth daily, Disp: , Rfl:     colchicine (COLCRYS) 0 6 mg tablet, Take 0 6 mg by mouth daily, Disp: , Rfl:     ferrous sulfate 325 (65 Fe) mg tablet, Take 325 mg by mouth daily with breakfast, Disp: , Rfl:     insulin detemir (LEVEMIR) 100 units/mL subcutaneous injection, Inject 10 Units under the skin daily at bedtime, Disp: , Rfl:     insulin lispro (HumaLOG) 100 units/mL injection, Inject under the skin 3 (three) times a day before meals, Disp: , Rfl:     methenamine hippurate (HIPREX) 1 g tablet, Take 1 g by mouth 2 (two) times a day with meals, Disp: , Rfl:     pantoprazole (PROTONIX) 40 mg tablet, Take 1 tablet (40 mg total) by mouth daily in the early morning, Disp: , Rfl: 0    tamsulosin (FLOMAX) 0 4 mg, Take 1 capsule (0 4 mg total) by mouth daily with dinner, Disp: , Rfl: 0    amLODIPine (NORVASC) 2 5 mg tablet, Take 4 tablets (10 mg total) by mouth daily for 30 days, Disp: 90 tablet, Rfl: 0      Physical Exam:  /66 (BP Location: Left arm, Cuff Size: Adult)   Pulse 88   Temp 98 6 °F (37 °C) (Tympanic)   Resp 16   Wt 71 4 kg (157 lb 4 8 oz)   SpO2 95%   BMI 23 92 kg/m²     Physical Exam   Constitutional: He is oriented to person, place, and time  He appears well-developed and well-nourished  No distress  HENT:   Head: Normocephalic and atraumatic  Mouth/Throat: Oropharynx is clear and moist  No oropharyngeal exudate  Eyes: Pupils are equal, round, and reactive to light  Conjunctivae are normal  No scleral icterus  Neck: Normal range of motion  Neck supple  No thyromegaly present  Cardiovascular: Normal rate, regular rhythm, normal heart sounds and intact distal pulses  No murmur heard  Pulmonary/Chest: Effort normal and breath sounds normal  No respiratory distress  Abdominal: Soft   Bowel sounds are normal  He exhibits no distension  There is no hepatosplenomegaly  There is no tenderness  Genitourinary:   Genitourinary Comments: Haile catheter draining clear yellow urine-some sediment noted in the tubing   Musculoskeletal: He exhibits no edema  Lymphadenopathy:     He has no cervical adenopathy  He has no axillary adenopathy  Neurological: He is alert and oriented to person, place, and time  Skin: Skin is warm and dry  No rash noted  He is not diaphoretic  No erythema  No pallor  Mild facial flushing and slight nasal telangiectasia noted   Psychiatric: His behavior is normal  Judgment and thought content normal  His affect is blunt  Vitals reviewed  Labs:  Lab Results   Component Value Date    WBC 7 61 08/05/2019    HGB 11 8 (L) 08/05/2019    HCT 37 4 08/05/2019    MCV 94 08/05/2019     08/05/2019     Lab Results   Component Value Date    K 4 7 08/05/2019     08/05/2019    CO2 25 08/05/2019    BUN 30 (H) 08/05/2019    CREATININE 1 93 (H) 08/05/2019    GLUF 346 (H) 08/05/2019    CALCIUM 9 0 08/05/2019    AST 9 08/05/2019    ALT 21 08/05/2019    ALKPHOS 112 08/05/2019    EGFR 32 08/05/2019         Patient voiced understanding and agreement in the above discussion  Aware to contact our office with questions/symptoms in the interim

## 2019-09-04 ENCOUNTER — APPOINTMENT (INPATIENT)
Dept: ULTRASOUND IMAGING | Facility: HOSPITAL | Age: 79
DRG: 444 | End: 2019-09-04
Payer: COMMERCIAL

## 2019-09-04 ENCOUNTER — APPOINTMENT (EMERGENCY)
Dept: CT IMAGING | Facility: HOSPITAL | Age: 79
DRG: 444 | End: 2019-09-04
Payer: COMMERCIAL

## 2019-09-04 ENCOUNTER — TELEPHONE (OUTPATIENT)
Dept: OTHER | Facility: OTHER | Age: 79
End: 2019-09-04

## 2019-09-04 ENCOUNTER — HOSPITAL ENCOUNTER (INPATIENT)
Facility: HOSPITAL | Age: 79
LOS: 6 days | Discharge: HOME HEALTH | DRG: 444 | End: 2019-09-10
Attending: INTERNAL MEDICINE | Admitting: INTERNAL MEDICINE
Payer: COMMERCIAL

## 2019-09-04 DIAGNOSIS — K81.0 ACUTE CHOLECYSTITIS: ICD-10-CM

## 2019-09-04 DIAGNOSIS — N18.9 ACUTE KIDNEY INJURY SUPERIMPOSED ON CHRONIC KIDNEY DISEASE (HCC): Chronic | ICD-10-CM

## 2019-09-04 DIAGNOSIS — N17.9 ACUTE KIDNEY INJURY SUPERIMPOSED ON CHRONIC KIDNEY DISEASE (HCC): Chronic | ICD-10-CM

## 2019-09-04 DIAGNOSIS — C7B.8 NEUROENDOCRINE CARCINOMA METASTATIC TO LIVER (HCC): ICD-10-CM

## 2019-09-04 DIAGNOSIS — R10.10 PAIN OF UPPER ABDOMEN: Primary | ICD-10-CM

## 2019-09-04 DIAGNOSIS — N13.30 HYDRONEPHROSIS, RIGHT: ICD-10-CM

## 2019-09-04 DIAGNOSIS — C7A.8 NEUROENDOCRINE CARCINOMA METASTATIC TO LIVER (HCC): ICD-10-CM

## 2019-09-04 DIAGNOSIS — I10 HYPERTENSION: ICD-10-CM

## 2019-09-04 DIAGNOSIS — E87.2 METABOLIC ACIDOSIS: ICD-10-CM

## 2019-09-04 PROBLEM — R10.84 GENERALIZED ABDOMINAL PAIN: Status: ACTIVE | Noted: 2019-09-04

## 2019-09-04 LAB
ALBUMIN SERPL BCP-MCNC: 3.7 G/DL (ref 3.5–5.7)
ALP SERPL-CCNC: 83 U/L (ref 55–165)
ALT SERPL W P-5'-P-CCNC: 7 U/L (ref 7–52)
ANION GAP SERPL CALCULATED.3IONS-SCNC: 12 MMOL/L (ref 4–13)
ANION GAP SERPL CALCULATED.3IONS-SCNC: 13 MMOL/L (ref 4–13)
AST SERPL W P-5'-P-CCNC: 8 U/L (ref 13–39)
BILIRUB SERPL-MCNC: 0.5 MG/DL (ref 0.2–1)
BUN SERPL-MCNC: 37 MG/DL (ref 7–25)
BUN SERPL-MCNC: 44 MG/DL (ref 7–25)
CALCIUM SERPL-MCNC: 8.6 MG/DL (ref 8.6–10.5)
CALCIUM SERPL-MCNC: 9.3 MG/DL (ref 8.6–10.5)
CHLORIDE SERPL-SCNC: 108 MMOL/L (ref 98–107)
CHLORIDE SERPL-SCNC: 110 MMOL/L (ref 98–107)
CO2 SERPL-SCNC: 12 MMOL/L (ref 21–31)
CO2 SERPL-SCNC: 17 MMOL/L (ref 21–31)
CREAT SERPL-MCNC: 2.05 MG/DL (ref 0.7–1.3)
CREAT SERPL-MCNC: 2.36 MG/DL (ref 0.7–1.3)
CREAT UR-MCNC: 14.1 MG/DL
ERYTHROCYTE [DISTWIDTH] IN BLOOD BY AUTOMATED COUNT: 15.2 % (ref 11.5–14.5)
GFR SERPL CREATININE-BSD FRML MDRD: 25 ML/MIN/1.73SQ M
GFR SERPL CREATININE-BSD FRML MDRD: 30 ML/MIN/1.73SQ M
GLUCOSE SERPL-MCNC: 219 MG/DL (ref 65–99)
GLUCOSE SERPL-MCNC: 237 MG/DL (ref 65–140)
GLUCOSE SERPL-MCNC: 256 MG/DL (ref 65–140)
GLUCOSE SERPL-MCNC: 257 MG/DL (ref 65–99)
GLUCOSE SERPL-MCNC: 269 MG/DL (ref 65–140)
GLUCOSE SERPL-MCNC: 271 MG/DL (ref 65–140)
HCT VFR BLD AUTO: 31.8 % (ref 42–47)
HGB BLD-MCNC: 10.7 G/DL (ref 14–18)
LACTATE SERPL-SCNC: 0.7 MMOL/L (ref 0.5–2)
LIPASE SERPL-CCNC: 118 U/L (ref 11–82)
LYMPHOCYTES # BLD AUTO: 0.65 THOUSAND/UL (ref 0.6–4.47)
LYMPHOCYTES # BLD AUTO: 6 % (ref 20–51)
MCH RBC QN AUTO: 29.4 PG (ref 26–34)
MCHC RBC AUTO-ENTMCNC: 33.5 G/DL (ref 31–37)
MCV RBC AUTO: 88 FL (ref 81–99)
MONOCYTES # BLD AUTO: 0.32 THOUSAND/UL (ref 0–1.22)
MONOCYTES NFR BLD AUTO: 3 % (ref 4–12)
NEUTS SEG # BLD: 9.83 THOUSAND/UL (ref 1.81–6.82)
NEUTS SEG NFR BLD AUTO: 91 % (ref 42–75)
PLATELET # BLD AUTO: 161 THOUSANDS/UL (ref 149–390)
PLATELET BLD QL SMEAR: ADEQUATE
PMV BLD AUTO: 8.2 FL (ref 8.6–11.7)
POTASSIUM SERPL-SCNC: 4 MMOL/L (ref 3.5–5.5)
POTASSIUM SERPL-SCNC: 5.1 MMOL/L (ref 3.5–5.5)
PROT SERPL-MCNC: 6.9 G/DL (ref 6.4–8.9)
RBC # BLD AUTO: 3.62 MILLION/UL (ref 4.3–5.9)
RBC MORPH BLD: NORMAL
SODIUM 24H UR-SCNC: 116 MOL/L
SODIUM SERPL-SCNC: 135 MMOL/L (ref 134–143)
SODIUM SERPL-SCNC: 137 MMOL/L (ref 134–143)
TOTAL CELLS COUNTED SPEC: 100
WBC # BLD AUTO: 10.8 THOUSAND/UL (ref 4.8–10.8)

## 2019-09-04 PROCEDURE — 74176 CT ABD & PELVIS W/O CONTRAST: CPT

## 2019-09-04 PROCEDURE — 82948 REAGENT STRIP/BLOOD GLUCOSE: CPT

## 2019-09-04 PROCEDURE — 83690 ASSAY OF LIPASE: CPT | Performed by: INTERNAL MEDICINE

## 2019-09-04 PROCEDURE — 85027 COMPLETE CBC AUTOMATED: CPT | Performed by: INTERNAL MEDICINE

## 2019-09-04 PROCEDURE — 96374 THER/PROPH/DIAG INJ IV PUSH: CPT

## 2019-09-04 PROCEDURE — 99285 EMERGENCY DEPT VISIT HI MDM: CPT

## 2019-09-04 PROCEDURE — 99221 1ST HOSP IP/OBS SF/LOW 40: CPT | Performed by: SPECIALIST

## 2019-09-04 PROCEDURE — 99223 1ST HOSP IP/OBS HIGH 75: CPT | Performed by: INTERNAL MEDICINE

## 2019-09-04 PROCEDURE — 84300 ASSAY OF URINE SODIUM: CPT | Performed by: INTERNAL MEDICINE

## 2019-09-04 PROCEDURE — 96376 TX/PRO/DX INJ SAME DRUG ADON: CPT

## 2019-09-04 PROCEDURE — 83605 ASSAY OF LACTIC ACID: CPT | Performed by: INTERNAL MEDICINE

## 2019-09-04 PROCEDURE — 76705 ECHO EXAM OF ABDOMEN: CPT

## 2019-09-04 PROCEDURE — 85007 BL SMEAR W/DIFF WBC COUNT: CPT | Performed by: INTERNAL MEDICINE

## 2019-09-04 PROCEDURE — 80048 BASIC METABOLIC PNL TOTAL CA: CPT | Performed by: INTERNAL MEDICINE

## 2019-09-04 PROCEDURE — 96375 TX/PRO/DX INJ NEW DRUG ADDON: CPT

## 2019-09-04 PROCEDURE — 82570 ASSAY OF URINE CREATININE: CPT | Performed by: INTERNAL MEDICINE

## 2019-09-04 PROCEDURE — 80053 COMPREHEN METABOLIC PANEL: CPT | Performed by: INTERNAL MEDICINE

## 2019-09-04 PROCEDURE — 36415 COLL VENOUS BLD VENIPUNCTURE: CPT | Performed by: INTERNAL MEDICINE

## 2019-09-04 PROCEDURE — 96361 HYDRATE IV INFUSION ADD-ON: CPT

## 2019-09-04 RX ORDER — ONDANSETRON 2 MG/ML
4 INJECTION INTRAMUSCULAR; INTRAVENOUS EVERY 6 HOURS PRN
Status: DISCONTINUED | OUTPATIENT
Start: 2019-09-04 | End: 2019-09-10 | Stop reason: HOSPADM

## 2019-09-04 RX ORDER — ASPIRIN 81 MG/1
81 TABLET, CHEWABLE ORAL DAILY
Status: DISCONTINUED | OUTPATIENT
Start: 2019-09-04 | End: 2019-09-10 | Stop reason: HOSPADM

## 2019-09-04 RX ORDER — TAMSULOSIN HYDROCHLORIDE 0.4 MG/1
0.4 CAPSULE ORAL
Status: DISCONTINUED | OUTPATIENT
Start: 2019-09-04 | End: 2019-09-10 | Stop reason: HOSPADM

## 2019-09-04 RX ORDER — METHENAMINE HIPPURATE 1000 MG/1
1 TABLET ORAL 2 TIMES DAILY WITH MEALS
Status: DISCONTINUED | OUTPATIENT
Start: 2019-09-04 | End: 2019-09-10 | Stop reason: HOSPADM

## 2019-09-04 RX ORDER — HYDROMORPHONE HCL/PF 1 MG/ML
0.5 SYRINGE (ML) INJECTION ONCE
Status: COMPLETED | OUTPATIENT
Start: 2019-09-04 | End: 2019-09-04

## 2019-09-04 RX ORDER — ACETAMINOPHEN 325 MG/1
650 TABLET ORAL EVERY 6 HOURS PRN
Status: DISCONTINUED | OUTPATIENT
Start: 2019-09-04 | End: 2019-09-10 | Stop reason: HOSPADM

## 2019-09-04 RX ORDER — AMLODIPINE BESYLATE 10 MG/1
10 TABLET ORAL DAILY
COMMUNITY
End: 2020-04-09 | Stop reason: SDUPTHER

## 2019-09-04 RX ORDER — HYDRALAZINE HYDROCHLORIDE 20 MG/ML
5 INJECTION INTRAMUSCULAR; INTRAVENOUS ONCE
Status: COMPLETED | OUTPATIENT
Start: 2019-09-04 | End: 2019-09-04

## 2019-09-04 RX ORDER — SODIUM CHLORIDE 9 MG/ML
100 INJECTION, SOLUTION INTRAVENOUS CONTINUOUS
Status: DISCONTINUED | OUTPATIENT
Start: 2019-09-04 | End: 2019-09-04

## 2019-09-04 RX ORDER — HYDROMORPHONE HCL/PF 1 MG/ML
0.5 SYRINGE (ML) INJECTION ONCE
Status: DISCONTINUED | OUTPATIENT
Start: 2019-09-04 | End: 2019-09-04

## 2019-09-04 RX ORDER — COLCHICINE 0.6 MG/1
0.6 TABLET ORAL DAILY
Status: DISCONTINUED | OUTPATIENT
Start: 2019-09-04 | End: 2019-09-10 | Stop reason: HOSPADM

## 2019-09-04 RX ORDER — SODIUM CHLORIDE 9 MG/ML
75 INJECTION, SOLUTION INTRAVENOUS CONTINUOUS
Status: DISCONTINUED | OUTPATIENT
Start: 2019-09-04 | End: 2019-09-08

## 2019-09-04 RX ORDER — ONDANSETRON 2 MG/ML
4 INJECTION INTRAMUSCULAR; INTRAVENOUS ONCE
Status: COMPLETED | OUTPATIENT
Start: 2019-09-04 | End: 2019-09-04

## 2019-09-04 RX ORDER — OXYCODONE HYDROCHLORIDE AND ACETAMINOPHEN 5; 325 MG/1; MG/1
1 TABLET ORAL EVERY 6 HOURS PRN
Status: DISCONTINUED | OUTPATIENT
Start: 2019-09-04 | End: 2019-09-10 | Stop reason: HOSPADM

## 2019-09-04 RX ORDER — AMLODIPINE BESYLATE 5 MG/1
10 TABLET ORAL DAILY
Status: DISCONTINUED | OUTPATIENT
Start: 2019-09-04 | End: 2019-09-10 | Stop reason: HOSPADM

## 2019-09-04 RX ORDER — PANTOPRAZOLE SODIUM 40 MG/1
40 TABLET, DELAYED RELEASE ORAL
Status: DISCONTINUED | OUTPATIENT
Start: 2019-09-04 | End: 2019-09-10 | Stop reason: HOSPADM

## 2019-09-04 RX ORDER — HYDROMORPHONE HCL/PF 1 MG/ML
0.5 SYRINGE (ML) INJECTION
Status: DISCONTINUED | OUTPATIENT
Start: 2019-09-04 | End: 2019-09-10 | Stop reason: HOSPADM

## 2019-09-04 RX ORDER — MELATONIN
1000 DAILY
Status: DISCONTINUED | OUTPATIENT
Start: 2019-09-04 | End: 2019-09-10 | Stop reason: HOSPADM

## 2019-09-04 RX ORDER — CHOLECALCIFEROL (VITAMIN D3) 10 MCG
1 TABLET ORAL
Status: DISCONTINUED | OUTPATIENT
Start: 2019-09-04 | End: 2019-09-10 | Stop reason: HOSPADM

## 2019-09-04 RX ORDER — HYDROMORPHONE HCL/PF 1 MG/ML
1 SYRINGE (ML) INJECTION ONCE
Status: COMPLETED | OUTPATIENT
Start: 2019-09-04 | End: 2019-09-04

## 2019-09-04 RX ORDER — HEPARIN SODIUM 5000 [USP'U]/ML
5000 INJECTION, SOLUTION INTRAVENOUS; SUBCUTANEOUS EVERY 8 HOURS SCHEDULED
Status: DISCONTINUED | OUTPATIENT
Start: 2019-09-04 | End: 2019-09-06

## 2019-09-04 RX ADMIN — OXYCODONE HYDROCHLORIDE AND ACETAMINOPHEN 1 TABLET: 5; 325 TABLET ORAL at 12:06

## 2019-09-04 RX ADMIN — METHENAMINE HIPPURATE 1 G: 1 TABLET ORAL at 16:57

## 2019-09-04 RX ADMIN — PANTOPRAZOLE SODIUM 40 MG: 40 TABLET, DELAYED RELEASE ORAL at 10:11

## 2019-09-04 RX ADMIN — AMLODIPINE BESYLATE 10 MG: 5 TABLET ORAL at 10:11

## 2019-09-04 RX ADMIN — COLCHICINE 0.6 MG: 0.6 TABLET, FILM COATED ORAL at 10:10

## 2019-09-04 RX ADMIN — VITAMIN D, TAB 1000IU (100/BT) 1000 UNITS: 25 TAB at 10:10

## 2019-09-04 RX ADMIN — SODIUM CHLORIDE 125 ML/HR: 9 INJECTION, SOLUTION INTRAVENOUS at 09:55

## 2019-09-04 RX ADMIN — SODIUM CHLORIDE 125 ML/HR: 9 INJECTION, SOLUTION INTRAVENOUS at 23:37

## 2019-09-04 RX ADMIN — HEPARIN SODIUM 5000 UNITS: 5000 INJECTION INTRAVENOUS; SUBCUTANEOUS at 10:00

## 2019-09-04 RX ADMIN — Medication 1 CAPSULE: at 16:57

## 2019-09-04 RX ADMIN — INSULIN LISPRO 3 UNITS: 100 INJECTION, SOLUTION INTRAVENOUS; SUBCUTANEOUS at 16:58

## 2019-09-04 RX ADMIN — INSULIN LISPRO 2 UNITS: 100 INJECTION, SOLUTION INTRAVENOUS; SUBCUTANEOUS at 23:37

## 2019-09-04 RX ADMIN — TAMSULOSIN HYDROCHLORIDE 0.4 MG: 0.4 CAPSULE ORAL at 16:57

## 2019-09-04 RX ADMIN — HYDROMORPHONE HYDROCHLORIDE 0.5 MG: 1 INJECTION, SOLUTION INTRAMUSCULAR; INTRAVENOUS; SUBCUTANEOUS at 16:04

## 2019-09-04 RX ADMIN — ONDANSETRON 4 MG: 2 INJECTION INTRAMUSCULAR; INTRAVENOUS at 10:00

## 2019-09-04 RX ADMIN — ONDANSETRON 4 MG: 2 INJECTION INTRAMUSCULAR; INTRAVENOUS at 23:10

## 2019-09-04 RX ADMIN — HYDROMORPHONE HYDROCHLORIDE 1 MG: 1 INJECTION, SOLUTION INTRAMUSCULAR; INTRAVENOUS; SUBCUTANEOUS at 04:47

## 2019-09-04 RX ADMIN — METHENAMINE HIPPURATE 1 G: 1 TABLET ORAL at 10:10

## 2019-09-04 RX ADMIN — HYDROMORPHONE HYDROCHLORIDE 0.5 MG: 1 INJECTION, SOLUTION INTRAMUSCULAR; INTRAVENOUS; SUBCUTANEOUS at 09:55

## 2019-09-04 RX ADMIN — SODIUM CHLORIDE 1000 ML: 0.9 INJECTION, SOLUTION INTRAVENOUS at 03:58

## 2019-09-04 RX ADMIN — HYDROMORPHONE HYDROCHLORIDE 0.5 MG: 1 INJECTION, SOLUTION INTRAMUSCULAR; INTRAVENOUS; SUBCUTANEOUS at 03:59

## 2019-09-04 RX ADMIN — ONDANSETRON 4 MG: 2 INJECTION INTRAMUSCULAR; INTRAVENOUS at 16:04

## 2019-09-04 RX ADMIN — HYDRALAZINE HYDROCHLORIDE 5 MG: 20 INJECTION INTRAMUSCULAR; INTRAVENOUS at 06:58

## 2019-09-04 RX ADMIN — ASPIRIN 81 MG 81 MG: 81 TABLET ORAL at 10:11

## 2019-09-04 RX ADMIN — HYDROMORPHONE HYDROCHLORIDE 0.5 MG: 1 INJECTION, SOLUTION INTRAMUSCULAR; INTRAVENOUS; SUBCUTANEOUS at 23:09

## 2019-09-04 RX ADMIN — HEPARIN SODIUM 5000 UNITS: 5000 INJECTION INTRAVENOUS; SUBCUTANEOUS at 23:09

## 2019-09-04 RX ADMIN — ONDANSETRON 4 MG: 2 INJECTION INTRAMUSCULAR; INTRAVENOUS at 04:00

## 2019-09-04 RX ADMIN — INSULIN LISPRO 2 UNITS: 100 INJECTION, SOLUTION INTRAVENOUS; SUBCUTANEOUS at 12:53

## 2019-09-04 NOTE — CONSULTS
Consultation - Urology   Mary Washburn [de-identified] y o  male MRN: [id number]  Unit/Bed#: -02 Encounter: [id number]      Assessment/Plan      Assessment:  New right hydronephrosis  Creatinine elevation  Chronic urinary retention managed with indwelling Haile catheter  Metastatic neuroendocrine tumor  The new mild to moderate right hydronephrosis is most likely secondary to partial extrinsic compression of the distal right ureter due to his known right lower quadrant mass  Right-sided upper abdominal pain is not likely secondary to the hydronephrosis  Plan:  Continue the indwelling Haile catheter  Follow renal function  I would not recommend intervention for the right hydronephrosis unless his renal function significantly deteriorates  If that should occur, nephrostomy tube placement would be the preferred modality due to the history of lower abdominal mass  History of Present Illness   Attending: Kole Chan MD  Reason for Consult / Principal Problem:  Right hydronephrosis  HPI: Mary Washburn is a [de-identified]y o  year old male who presents with severe right upper quadrant abdominal pain  I have been following him since earlier this year when he presented with urinary retention and bilateral hydronephrosis with acute renal injury  He since has been managed with an indwelling Haile catheter  I last changed in the office on August 28, 2019  The catheter has been draining well without any difficulty  His initial bilateral hydronephrosis earlier this year did resolve completely on follow-up studies  However, the current CT scan from this admission now shows new mild-to-moderate right hydronephrosis  There is no ureteral stone seen  He does have a known history of a neuroendocrine tumor which was located at the base of the mesentery in the right lower quadrant  He also has some small lymph nodes as well as liver metastases  He has been managed with outpatient chemotherapy for the past several months    His creatinine has increased to 2 7 to yesterday and down slightly to 2 36 today  His baseline creatinine is about 1 8  However, he has had creatinine elevations to this level in the past, especially when his Haile catheter was blocked  Consults    Review of Systems   Gastrointestinal: Positive for abdominal pain  Genitourinary: Negative for flank pain, hematuria, penile pain, scrotal swelling and testicular pain         Historical Information   Past Medical History:   Diagnosis Date    BPH (benign prostatic hyperplasia)     Cancer ([place] [address] )     liver cancer    Cardiac disease     Coronary artery disease     Diabetes mellitus ([place] [address] )     DJD (degenerative joint disease)     Gait disturbance     Generalized weakness     GERD (gastroesophageal reflux disease)     Gout     Hyperlipidemia     Hypertension     Renal disorder      Past Surgical History:   Procedure Laterality Date    CORONARY ANGIOPLASTY WITH STENT PLACEMENT      IR IMAGE GUIDED BIOPSY/ASPIRATION LIVER  1/24/2019     Social History   Social History     Substance and Sexual Activity   Alcohol Use Never    Frequency: Never     Social History     Substance and Sexual Activity   Drug Use Never     Social History     Tobacco Use   Smoking Status Never Smoker   Smokeless Tobacco Never Used     Family History: non-contributory    Meds/Allergies   current meds:   Current Facility-Administered Medications   Medication Dose Route Frequency    acetaminophen (TYLENOL) tablet 650 mg  650 mg Oral Q6H PRN    amLODIPine (NORVASC) tablet 10 mg  10 mg Oral Daily    aspirin chewable tablet 81 mg  81 mg Oral Daily    b complex-vitamin C-folic acid (NEPHROCAPS) capsule 1 capsule  1 capsule Oral Daily With Dinner    cholecalciferol (VITAMIN D3) tablet 1,000 Units  1,000 Units Oral Daily    colchicine (COLCRYS) tablet 0 6 mg  0 6 mg Oral Daily    heparin (porcine) subcutaneous injection 5,000 Units  5,000 Units Subcutaneous Q8H [facility]    HYDROmorphone (DILAUDID) injection 0 5 mg  0 5 mg Intravenous Q3H PRN    insulin lispro (HumaLOG) 100 units/mL subcutaneous injection 1-5 Units  1-5 Units Subcutaneous TID AC    methenamine hippurate (HIPREX) tablet 1 g  1 g Oral BID With Meals    ondansetron (ZOFRAN) injection 4 mg  4 mg Intravenous Q6H PRN    oxyCODONE-acetaminophen (PERCOCET) 5-325 mg per tablet 1 tablet  1 tablet Oral Q6H PRN    pantoprazole (PROTONIX) EC tablet 40 mg  40 mg Oral Early Morning    sodium chloride 0 9 % infusion  125 mL/hr Intravenous Continuous    tamsulosin (FLOMAX) capsule 0 4 mg  0 4 mg Oral Daily With Dinner     No Known Allergies    Objective   Vitals: Blood pressure 148/78, pulse 92, temperature (!) 97 1 °F (36 2 °C), temperature source Tympanic, resp  rate 20, height 5' 8" (1 727 m), weight 71 6 kg (157 lb 13 6 oz), SpO2 98 %  I/O last 24 hours: In: 2000 [I V :1000; IV Piggyback:1000]  Out: 2500 [Urine:2500]    Invasive Devices     Peripheral Intravenous Line            Peripheral IV 09/04/19 Right Arm less than 1 day          Drain            Urethral Catheter Latex 16 Fr  67 days                Physical Exam   Abdominal: There is tenderness  Genitourinary: Penis normal     Right upper quadrant tenderness as well as right lower quadrant tenderness  No right flank tenderness  Penis is normal with subcoronal hypospadias  Haile catheter is in place draining clear yellow urine  Testicles and spermatic cords normal with no swelling or tenderness        Lab Results:   CBC:   Lab Results   Component Value Date    WBC 10 80 09/04/2019    HGB 10 7 (L) 09/04/2019    HCT 31 8 (L) 09/04/2019    MCV 88 09/04/2019     09/04/2019    MCH 29 4 09/04/2019    MCHC 33 5 09/04/2019    RDW 15 2 (H) 09/04/2019    MPV 8 2 (L) 09/04/2019     CMP:   Lab Results   Component Value Date    SODIUM 137 09/04/2019     (H) 09/04/2019    CO2 17 (L) 09/04/2019    BUN 37 (H) 09/04/2019    CREATININE 2 05 (H) 09/04/2019    CALCIUM 9 3 09/04/2019    AST 8 (L) 09/04/2019    ALT 7 09/04/2019    ALKPHOS 83 09/04/2019    EGFR 30 09/04/2019     Urinalysis: No results found for: [place] Body, SPECGRAV, PHUR, LEUKOCYTESUR, NITRITE, PROTEINUA, GLUCOSEU, KETONESU, BILIRUBINUR, BLOODU  Urine Culture: No results found for: URINECX  Imaging Studies: I have personally reviewed pertinent reports  EKG, Pathology, and Other Studies: I have personally reviewed pertinent reports  VTE Prophylaxis: Sequential compression device (Venodyne)     Code Status: Level 1 - Full Code  Advance Directive and Living Will:      Power of :    POLST:      Counseling / Coordination of Care  Total floor / unit time spent today 45 minutes  Greater than 50% of total time was spent with the patient and / or family counseling and / or coordination of care   A description of the counseling / coordination of care:  I have discussed the case with the hospitalist

## 2019-09-04 NOTE — NURSING NOTE
Pt admitted to room 108 2 from the er earlier in the day, dr Kaylyn Moses was in to see and evaluate the pt, orders noted, abd pain controlled with the meds noted,  Dr Sanchez Rivers was in to see and evaluate the pt, presently having u/s done

## 2019-09-04 NOTE — CONSULTS
Consultation - Nephrology   Ricci Cuellar [de-identified] y o  male MRN: [id number]  Unit/Bed#: -02 Encounter: [id number]      A/P:  1  Acute renal failure sure what on top of chronic kidney disease             Likely due to volume depletion at this time along with a new right-sided hydroureter  Agree volume resuscitation at this time we should continue with this, however, I will increase the rate up to 125 mL/hour for now  2  Chronic kidney disease stage 3 with baseline creatinine likely around 1 4 - 1 5 mg/dL              As mentioned in prior notes, patient has not been able to achieve this prior baseline creatinine of 0 9 - 1 mg/dL for some time  3  Probable diabetic nephropathy  4  Proteinuria  5  Acidosis   Most likely due to diarrhea, continue monitor for now potential bicarbonate supplementation if indicated  Follow up blood work little later this afternoon  6  Neuroendocrine tumor               the patient is all hematology yesterday, Dr Netta Dennison, he is continue with Lanreotide injections every 4 weeks for now  7  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              Continue Haile catheter  8  Right hydroureter nephrosis   Unclear if this is due to retroperitoneal lymphadenopathy or some other obstructive process  Consider urology evaluation, we need to continue monitor the patient's kidney function closely  9  Iron deficiency anemia               resolved           Thank you for allowing us to participate in the care of your patient  Please feel free to contact us regarding the care of this patient, or any other questions/concerns that may be applicable      Patient Active Problem List   Diagnosis    Weakness generalized    Type 2 diabetes mellitus with complication, with long-term current use of insulin ([address] )    Essential hypertension    Mixed hyperlipidemia    Cardiac disease    Generalized abdominal mass    Hydroureter    Anemia    Syncope and collapse    Accelerated hypertension    Liver mass    Neuroendocrine tumor    Acute cystitis without hematuria    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 [address]       History of Present Illness   Physician Requesting Consult: Marce Boucher MD  Reason for Consult / Principal Problem:  Acute renal failure  Hx and PE limited by:   HPI: Jeovany Pradhan is a [de-identified]y o  year old male who presents this morning to the emergency department with severe epigastric pain which began at around 11:00 p m  On September 3rd and was associated with severe nausea and emesis x3 episodes as well as severe diarrhea which began earlier in the day and then worsened into the night  At this time, the patient continues to feel very nauseated and has severe epigastric pain  Since coming to the emergency department, patient has been given Dilaudid as well as anti medic in the form of Zofran  He is feeling somewhat better though continues to remain severely nauseated and has epigastric pain present  Patient has a significant other fluids with him in claims that he has had elevated blood sugars and last 2-3 days prior to these episodes starting after having very stable morning blood sugars the past few months    Upon arrival, patient had a CT scan of the abdomen pelvis without IV contrast and a new right-sided hydroureteronephrosis, possible increased size of lymph nodes on the ipsilateral side  From the renal standpoint, patient's creatinine is elevated at this time, is associated with a decreased serum bicarbonate at 12 millimole/L and a borderline low sodium at 135 millimole/L  Patient denies any use of nonsteroidal anti-inflammatory medications  He has been taking all his medications as prescribed  History obtained from friend, chart review and the patient    Review of Systems - Negative except as mentioned above in HPI, more specifics as mentioned below    Review of Systems - General ROS: positive for  - fatigue  Psychological ROS: negative  Ophthalmic ROS: negative  ENT ROS: negative  Allergy and Immunology ROS: negative  Hematological and Lymphatic ROS: negative  Endocrine ROS: negative  Respiratory ROS: no cough, shortness of breath, or wheezing  Cardiovascular ROS: no chest pain or dyspnea on exertion  Gastrointestinal ROS:  As mentioned above  Genito-Urinary ROS: positive for - urinary retention  Musculoskeletal ROS: positive for - muscular weakness  Neurological ROS: no TIA or stroke symptoms  Dermatological ROS: negative    Historical Information   Past Medical History:   Diagnosis Date    BPH (benign prostatic hyperplasia)     Cancer (HCC)     liver cancer    Cardiac disease     Coronary artery disease     Diabetes mellitus ([address] )     DJD (degenerative joint disease)     Gait disturbance     Generalized weakness     GERD (gastroesophageal reflux disease)     Gout     Hyperlipidemia     Hypertension     Renal disorder      Past Surgical History:   Procedure Laterality Date    CORONARY ANGIOPLASTY WITH STENT PLACEMENT      IR IMAGE GUIDED BIOPSY/ASPIRATION LIVER  1/24/2019     Social History   Social History     Substance and Sexual Activity   Alcohol Use Never    Frequency: Never     Social History     Substance and Sexual Activity   Drug Use Never     Social History     Tobacco Use   Smoking Status Never Smoker   Smokeless Tobacco Never Used     Family History   Problem Relation Age of Onset    Cancer Mother     Hypertension Father     Cancer Brother     Cancer Maternal Grandmother     Cancer Paternal Aunt        Meds/Allergies   all current active meds have been reviewed, current meds:   Current Facility-Administered Medications   Medication Dose Route Frequency    acetaminophen (TYLENOL) tablet 650 mg  650 mg Oral Q6H PRN    amLODIPine (NORVASC) tablet 10 mg  10 mg Oral Daily    aspirin chewable tablet 81 mg  81 mg Oral Daily    b complex-vitamin C-folic acid (NEPHROCAPS) capsule 1 capsule  1 capsule Oral Daily With Dinner    cholecalciferol (VITAMIN D3) tablet 1,000 Units  1,000 Units Oral Daily    colchicine (COLCRYS) tablet 0 6 mg  0 6 mg Oral Daily    heparin (porcine) subcutaneous injection 5,000 Units  5,000 Units Subcutaneous Q8H [address]    methenamine hippurate (HIPREX) tablet 1 g  1 g Oral BID With Meals    ondansetron (ZOFRAN) injection 4 mg  4 mg Intravenous Q6H PRN    pantoprazole (PROTONIX) EC tablet 40 mg  40 mg Oral Early Morning    sodium chloride 0 9 % infusion  100 mL/hr Intravenous Continuous    tamsulosin (FLOMAX) capsule 0 4 mg  0 4 mg Oral Daily With Dinner    and PTA meds:    Medications Prior to Admission   Medication    amLODIPine (NORVASC) 2 5 mg tablet    aspirin 81 MG tablet    b complex-vitamin C-folic acid (NEPHROCAPS) 1 mg capsule    cholecalciferol (VITAMIN D3) 1,000 units tablet    colchicine (COLCRYS) 0 6 mg tablet    insulin detemir (LEVEMIR) 100 units/mL subcutaneous injection    insulin lispro (HumaLOG) 100 units/mL injection    methenamine hippurate (HIPREX) 1 g tablet    pantoprazole (PROTONIX) 40 mg tablet    tamsulosin (FLOMAX) 0 4 mg         No Known Allergies    Objective     Intake/Output Summary (Last 24 hours) at 9/4/2019 0822  Last data filed at 9/4/2019 0442  Gross per 24 hour   Intake 1000 ml   Output    Net 1000 ml       Invasive Devices:   Urethral Catheter Latex 16 Fr   (Active) Physical Exam      I/O last 3 completed shifts: In: 1000 [IV Piggyback:1000]  Out: -     Vitals:    09/04/19 0700   BP: (!) 189/88   Pulse: 85   Resp:    Temp:    SpO2: 99%       Gen: in NAD, Alert/Awake  HEENT: no sclerous icterus, MMM, neck supple  CV: +S1/S2, RRR  Lungs: CTA bilaterally  Abd: +BS, soft, generalized tenderness more so in the epigastric region  Ext: all four extremities are warm  Skin: no rashes noted  Neuro: CN II-XII intact    Current Weight: Weight - Scale: 69 4 kg (153 lb)  First Weight: Weight - Scale: 69 4 kg (153 lb)    Lab Results:  I have personally reviewed pertinent labs      CBC:   Lab Results   Component Value Date    WBC 10 80 09/04/2019    HGB 10 7 (L) 09/04/2019    HCT 31 8 (L) 09/04/2019    MCV 88 09/04/2019     09/04/2019    MCH 29 4 09/04/2019    MCHC 33 5 09/04/2019    RDW 15 2 (H) 09/04/2019    MPV 8 2 (L) 09/04/2019    NRBC 0 09/03/2019     CMP:   Lab Results   Component Value Date    K 4 0 09/04/2019     (H) 09/04/2019    CO2 12 (L) 09/04/2019    BUN 44 (H) 09/04/2019    CREATININE 2 36 (H) 09/04/2019    CALCIUM 8 6 09/04/2019    AST 8 (L) 09/04/2019    ALT 7 09/04/2019    ALKPHOS 83 09/04/2019    EGFR 25 09/04/2019     Phosphorus: No results found for: PHOS  Magnesium: No results found for: MG  Urinalysis: No results found for: COLORU, CLARITYU, SPECGRAV, PHUR, LEUKOCYTESUR, NITRITE, PROTEINUA, GLUCOSEU, KETONESU, BILIRUBINUR, BLOODU  Ionized Calcium: No results found for: CAION  Coagulation: No results found for: PT, INR, APTT  Troponin: No results found for: TROPONINI  ABG: No results found for: PHART, XLD0DSV, PO2ART, FTE0WWX, Q3GKLHTN, BEART, SOURCE    Results from last 7 days   Lab Units 09/04/19  0410 09/03/19  1104   POTASSIUM mmol/L 4 0 4 5   CHLORIDE mmol/L 110* 113*   CO2 mmol/L 12* 18*   BUN mg/dL 44* 47*   CREATININE mg/dL 2 36* 2 72*   CALCIUM mg/dL 8 6 9 5   ALK PHOS U/L 83 125*   ALT U/L 7 15   AST U/L 8* 8       Radiology review:  Procedure: Ct Abdomen Pelvis Wo Contrast    Result Date: 9/4/2019  Narrative: CT ABDOMEN AND PELVIS WITHOUT IV CONTRAST INDICATION:   Abdominal distension Abdominal pain, unspecified  COMPARISON:  CT abdomen and pelvis on 6/4/2019  TECHNIQUE:  CT examination of the abdomen and pelvis was performed without intravenous contrast   Axial, sagittal, and coronal 2D reformatted images were created from the source data and submitted for interpretation  Radiation dose length product (DLP) for this visit:  377 9 mGy-cm   This examination, like all CT scans performed in the [facility], was performed utilizing techniques to minimize radiation dose exposure, including the use of iterative reconstruction and automated exposure control  Enteric contrast was not administered  FINDINGS: ABDOMEN LOWER CHEST:  Atelectatic changes are noted at the lung bases  Mildly prominent interstitial markings in the lower lobes  LIVER/BILIARY TREE:  Liver is diffusely heterogeneous in attenuation likely secondary to underlying lesions  Some of the lesions appear to be less conspicuous on this study although accurate assessment is hampered due to lack of intravenous contrast   A  discrete lesion in the left lobe measures 1 3 x 1 3 cm (series 2, image 20), previously measuring 1 7 x 1 8 cm  GALLBLADDER:  Moderately distended and filled with sludge  No pericholecystic inflammatory change  SPLEEN:  Unremarkable  PANCREAS:  Diffuse parenchymal atrophy, otherwise unremarkable,  ADRENAL GLANDS:  Unremarkable  KIDNEYS/URETERS:  There is moderate right hydroureteronephrosis which is new since the prior study  No radiopaque calculus in the urinary tract is identified to account for the finding  Left kidney is unremarkable  Bilateral renal vascular calcifications  STOMACH AND BOWEL:  No bowel obstruction  APPENDIX:  No findings to suggest appendicitis   ABDOMINOPELVIC CAVITY:  There is a partially calcified soft tissue mass in the root of the mesentery measuring approximately 4 5 x 3 1 x 4 7 cm in maximal axial dimensions  The mass is not significantly changed in size from the prior study when measured  in a similar fashion  The caudad aspect of the mass abuts the adjacent sigmoid colon which does not appear to be thickened  Minimal increase in the size of several mesenteric and retroperitoneal lymph nodes  For reference: -Mesenteric node in the left anterior abdomen measures 8mm in short axis (series 2, image 56), previously 5 mm  -Left para-aortic node measures 10 mm in short axis (series 2, image 48), previously 8 mm  No ascites or free intraperitoneal air  VESSELS:  Atherosclerotic changes are present  No evidence of aneurysm  PELVIS REPRODUCTIVE ORGANS:  The prostate is enlarged  URINARY BLADDER:  Decompressed by Haile catheter  There is diffuse bladder wall thickening which is more than expected for underdistention, potentially from bladder outlet obstruction  ABDOMINAL WALL/INGUINAL REGIONS:  There is a stable soft tissue nodule in the subcutaneous fat abutting the posterior margin of the right gluteal musculature measuring 9 x 8 mm  Attention on follow-up  OSSEOUS STRUCTURES:  No acute fracture or destructive osseous lesion  Degenerative changes throughout the lumbar spine  Impression: New right-sided moderate hydroureteronephrosis  No evidence of obstructing radiopaque calculus in the urinary tract to account for the finding  No significant change in the size of the partially calcified soft tissue mass in the root of the mesentery suspicious for neuroendocrine/carcinoid tumor  Minimal increase in the size of several small mesenteric and retroperitoneal lymph nodes  Attention on short-term follow-up  Multiple ill-defined hypodense lesions, some of which appear to be less conspicuous on this exam but not well evaluated without intravenous contrast  Moderately distended gallbladder containing sludge    No pericholecystic inflammation  Prostatomegaly  Urinary bladder is decompressed by a Haile catheter, however the degree of bladder wall thickening suggests superimposed chronic bladder outlet obstruction   Workstation performed: [id number]         Dearl DO Shin

## 2019-09-04 NOTE — ASSESSMENT & PLAN NOTE
· Possibly secondary to acute renal failure  · Will follow up lactate level  · Continue IV fluids  · Nephrology input appreciated

## 2019-09-04 NOTE — ASSESSMENT & PLAN NOTE
· Patient currently on monthly chemo injections  · Continue outpatient follow-up with Dr Zoila Lerma

## 2019-09-04 NOTE — ASSESSMENT & PLAN NOTE
· Possibly secondary to worsening right-sided hydrouteronephrosis, patient also with biliary sludge  · Will continue pain control as needed  · Check lactate  · CT scan of the abdomen/pelvis results reviewed  · Will follow up with Urology

## 2019-09-04 NOTE — CONSULTS
Consultation - General Surgery   Schuyler Ralph [de-identified] y o  male MRN: [id number]  Unit/Bed#: -02 Encounter: [id number]    Assessment/Plan     Assessment:  The patient is an 79-year-old white male with known neuroendocrine cancer metastatic to the liver presenting with acute onset of right upper quadrant abdominal pain nausea and vomiting  The patient has previously been told he has a sluggish gallbladder  Imaging with the CT scan shows a distended gallbladder, and new hydroureter on the right side  Ultrasound confirms sludge in gallstones without evidence of acute cholecystitis  The patient remains quite tender in the right upper quadrant, and does have bulky metastatic disease  A HIDA scan is planned to differentiate the tumor pain from acute cholecystitis  Plan:  IV hydration  Hold off on IV antibiotics  HIDA scan  If HIDA scan is positive, proceed to percutaneous cholecystostomy    History of Present Illness   History, ROS and PFSH obtained from family and hospital records  HPI:  Schuyler Ralph is a [de-identified] y o  male who presented with abrupt onset right upper quadrant abdominal pain in the evening associated with the nausea and bilious vomiting  The patient presented to the emergency room where imaging demonstrated a distended gallbladder, and sludge without wall thickening or pericholecystic fluid  Also the extent of his liver metastasis was difficult to gauge on the noncontrast study  Subsequent ultrasound shows bulky liver metastasis, confirms the presence of a distended gallbladder, but there is no wall thickening or pericholecystic fluid  The patient remains tender in the right upper quadrant, has a normal white count, and moderately elevated liver function tests  A HIDA scan has been requested to differentiate pain from liver metastasis from acute cholecystitis  Inpatient consult to Acute Care Surgery  Consult performed by:  Chad Lockwood MD  Consult ordered by: Francisco Morris MD          Review of Systems   Constitutional: Positive for appetite change  Negative for chills and fever  HENT: Negative for trouble swallowing  Respiratory: Negative for cough and shortness of breath  Cardiovascular: Negative for chest pain  Gastrointestinal: Positive for abdominal pain, nausea and vomiting  Genitourinary: Negative for difficulty urinating  Skin: Negative for color change  Neurological: Positive for weakness  Psychiatric/Behavioral: Negative for agitation and confusion         Historical Information   Past Medical History:   Diagnosis Date    BPH (benign prostatic hyperplasia)     Cancer ([facility] )     liver cancer    Cardiac disease     Coronary artery disease     Diabetes mellitus ([facility] )     DJD (degenerative joint disease)     Gait disturbance     Generalized weakness     GERD (gastroesophageal reflux disease)     Gout     Hyperlipidemia     Hypertension     Renal disorder      Past Surgical History:   Procedure Laterality Date    CORONARY ANGIOPLASTY WITH STENT PLACEMENT      IR IMAGE GUIDED BIOPSY/ASPIRATION LIVER  1/24/2019     Social History   Social History     Substance and Sexual Activity   Alcohol Use Never    Frequency: Never     Social History     Substance and Sexual Activity   Drug Use Never     Social History     Tobacco Use   Smoking Status Never Smoker   Smokeless Tobacco Never Used     Family History:   Family History   Problem Relation Age of Onset    Cancer Mother     Hypertension Father     Cancer Brother     Cancer Maternal Grandmother     Cancer Paternal Aunt        Meds/Allergies   current meds:   Current Facility-Administered Medications   Medication Dose Route Frequency    acetaminophen (TYLENOL) tablet 650 mg  650 mg Oral Q6H PRN    amLODIPine (NORVASC) tablet 10 mg  10 mg Oral Daily    aspirin chewable tablet 81 mg  81 mg Oral Daily    b complex-vitamin C-folic acid (NEPHROCAPS) capsule 1 capsule  1 capsule Oral Daily With [employer]  cholecalciferol (VITAMIN D3) tablet 1,000 Units  1,000 Units Oral Daily    colchicine (COLCRYS) tablet 0 6 mg  0 6 mg Oral Daily    heparin (porcine) subcutaneous injection 5,000 Units  5,000 Units Subcutaneous Q8H [address]    HYDROmorphone (DILAUDID) injection 0 5 mg  0 5 mg Intravenous Q3H PRN    insulin lispro (HumaLOG) 100 units/mL subcutaneous injection 1-5 Units  1-5 Units Subcutaneous TID AC    methenamine hippurate (HIPREX) tablet 1 g  1 g Oral BID With Meals    ondansetron (ZOFRAN) injection 4 mg  4 mg Intravenous Q6H PRN    oxyCODONE-acetaminophen (PERCOCET) 5-325 mg per tablet 1 tablet  1 tablet Oral Q6H PRN    pantoprazole (PROTONIX) EC tablet 40 mg  40 mg Oral Early Morning    sodium chloride 0 9 % infusion  125 mL/hr Intravenous Continuous    tamsulosin (FLOMAX) capsule 0 4 mg  0 4 mg Oral Daily With Dinner     No Known Allergies    Objective   First Vitals:   Blood Pressure: (!) 178/81 (09/04/19 0309)  Pulse: 67 (09/04/19 0309)  Temperature: 97 9 °F (36 6 °C) (09/04/19 0309)  Temp Source: Temporal (09/04/19 0309)  Respirations: 20 (09/04/19 0309)  Height: 5' 8" (172 7 cm) (09/04/19 0309)  Weight - Scale: 69 4 kg (153 lb) (09/04/19 0309)  SpO2: 100 % (09/04/19 0309)    Current Vitals:   Blood Pressure: 148/78 (09/04/19 1700)  Pulse: 92 (09/04/19 1534)  Temperature: (!) 97 1 °F (36 2 °C) (09/04/19 1534)  Temp Source: Tympanic (09/04/19 1534)  Respirations: 20 (09/04/19 1534)  Height: 5' 8" (172 7 cm) (09/04/19 0309)  Weight - Scale: 71 6 kg (157 lb 13 6 oz) (09/04/19 0832)  SpO2: 98 % (09/04/19 1534)      Intake/Output Summary (Last 24 hours) at 9/4/2019 1856  Last data filed at 9/4/2019 1234  Gross per 24 hour   Intake 2000 ml   Output 2500 ml   Net -500 ml       Invasive Devices     Peripheral Intravenous Line            Peripheral IV 09/04/19 Right Arm less than 1 day          Drain            Urethral Catheter Latex 16 Fr  67 days                Physical Exam   Constitutional:   Thin white male in no acute distress   HENT:   Head: Normocephalic and atraumatic  Eyes: No scleral icterus  Neck: Neck supple  Cardiovascular: Normal rate, regular rhythm and normal heart sounds  No murmur heard  Pulmonary/Chest: Effort normal and breath sounds normal    Abdominal: Soft  Mass: With prominent liver edge  There is tenderness  There is no guarding  Genitourinary:   Genitourinary Comments: Deferred   Musculoskeletal: He exhibits no edema  Neurological:   Drowsy after IV pain medication   Skin: Skin is warm and dry  Psychiatric:   Unable to assess       Lab Results:   I have personally reviewed pertinent lab results  , CBC:   Lab Results   Component Value Date    WBC 10 80 09/04/2019    HGB 10 7 (L) 09/04/2019    HCT 31 8 (L) 09/04/2019    MCV 88 09/04/2019     09/04/2019    MCH 29 4 09/04/2019    MCHC 33 5 09/04/2019    RDW 15 2 (H) 09/04/2019    MPV 8 2 (L) 09/04/2019   , CMP:   Lab Results   Component Value Date    SODIUM 137 09/04/2019    K 5 1 09/04/2019     (H) 09/04/2019    CO2 17 (L) 09/04/2019    BUN 37 (H) 09/04/2019    CREATININE 2 05 (H) 09/04/2019    CALCIUM 9 3 09/04/2019    AST 8 (L) 09/04/2019    ALT 7 09/04/2019    ALKPHOS 83 09/04/2019    EGFR 30 09/04/2019   , Lipase:   Lab Results   Component Value Date    LIPASE 118 (H) 09/04/2019     Imaging: I have personally reviewed pertinent reports  and I have personally reviewed pertinent films in PACS  EKG, Pathology, and Other Studies: I have personally reviewed pertinent reports  Counseling / Coordination of Care  Total floor / unit time spent today 30 minutes  Greater than 50% of total time was spent with the patient and / or family counseling and / or coordination of care  A description of the counseling / coordination of care: Including discussion with family and primary service

## 2019-09-04 NOTE — TELEPHONE ENCOUNTER
[place] Text Dr Sachin Flanagan @ [phone]    [place]/[facility]/[phone]/Sohail Scanlon/1940/Hydroureteronephrosis

## 2019-09-04 NOTE — H&P
H&P- Melissa Oppenheim 1940, [de-identified] y o  male MRN: [id number]    Unit/Bed#: -02 Encounter: [id number]    Primary Care Provider: Maral Lux DO   Date and time admitted to hospital: 9/4/2019  3:03 AM      * Generalized abdominal pain  Assessment & Plan  · Possibly secondary to worsening right-sided hydrouteronephrosis, patient also with biliary sludge  · Will continue pain control as needed  · Check lactate  · CT scan of the abdomen/pelvis results reviewed  · Will follow up with Urology    Acute kidney injury superimposed on chronic kidney disease ([facility] [address] )  Assessment & Plan  · Patient with chronic urinary retention and chronic Haile catheter  · Will continue with tamsulosin  · IV fluids  · CT scan of the abdomen/pelvis suggested new hydroureteronephrosis, will follow up with Urology  · Monitor urine output  · Nephrology input appreciated    Neuroendocrine tumor  Assessment & Plan  · Patient currently on monthly chemo injections  · Continue outpatient follow-up with Dr Romeo Gonzalez    Accelerated hypertension  Assessment & Plan  · BP has improved from admission  · Will continue home meds  · Hydralazine as needed    Anemia  Assessment & Plan  · Likely multifactorial  · No signs of acute bleeding  · Will monitor H&H and transfuse as needed    Metabolic acidosis  Assessment & Plan  · Possibly secondary to acute renal failure  · Will follow up lactate level  · Continue IV fluids  · Nephrology input appreciated      VTE Prophylaxis: Heparin  Code Status: full  POLST: There is no POLST form on file for this patient (pre-hospital)  Discussion with family:  Discussed plan of care with son at bedside    Anticipated Length of Stay:  Patient will be admitted on an Inpatient basis with an anticipated length of stay of  > 2 midnights     Justification for Hospital Stay:  Acute renal failure    Chief Complaint:   Abdominal pain    History of Present Illness:    Melissa Oppenheim is a [de-identified] y o  male who presents with abdominal pain   Patient presents with generalized abdominal pain which started yesterday morning  Pain is present diffusely through the abdomen per patient, nonradiating, no alleviating or exacerbating factors, sharp pain  Pain is approximately 9/10 at worse  Patient has been having some nausea and decreased appetite  Also experiencing diarrhea nonbloody  Patient with chronic Haile catheter  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  Review of Systems:  Review of Systems   Constitutional: Positive for activity change, appetite change and fatigue  Respiratory: Negative for shortness of breath  Cardiovascular: Negative for chest pain  Gastrointestinal: Positive for abdominal pain, diarrhea and nausea  Negative for blood in stool and vomiting  Genitourinary: Negative for hematuria  Neurological: Positive for weakness  Negative for speech difficulty and headaches  All other systems reviewed and are negative  Past Medical and Surgical History:   Past Medical History:   Diagnosis Date    BPH (benign prostatic hyperplasia)     Cancer ([facility] )     liver cancer    Cardiac disease     Coronary artery disease     Diabetes mellitus ([facility] )     DJD (degenerative joint disease)     Gait disturbance     Generalized weakness     GERD (gastroesophageal reflux disease)     Gout     Hyperlipidemia     Hypertension     Renal disorder        Past Surgical History:   Procedure Laterality Date    CORONARY ANGIOPLASTY WITH STENT PLACEMENT      IR IMAGE GUIDED BIOPSY/ASPIRATION LIVER  1/24/2019       Meds/Allergies:  Prior to Admission medications    Medication Sig Start Date End Date Taking?  Authorizing Provider   amLODIPine (NORVASC) 10 mg tablet Take 10 mg by mouth daily   Yes Historical Provider, MD   aspirin 81 MG tablet Take 81 mg by mouth daily   Yes Historical Provider, MD escobar complex-vitamin C-folic acid (NEPHROCAPS) 1 mg capsule Take 1 capsule by mouth daily with dinner 7/7/19  Yes Julien Brian MD   cholecalciferol (VITAMIN D3) 1,000 units tablet Take 1,000 Units by mouth daily   Yes Historical Provider, MD   colchicine (COLCRYS) 0 6 mg tablet Take 0 6 mg by mouth daily   Yes Historical Provider, MD   insulin detemir (LEVEMIR) 100 units/mL subcutaneous injection Inject 10 Units under the skin daily at bedtime   Yes Historical Provider, MD   insulin lispro (HumaLOG) 100 units/mL injection Inject under the skin 3 (three) times a day before meals   Yes Historical Provider, MD   methenamine hippurate (HIPREX) 1 g tablet Take 1 g by mouth 2 (two) times a day with meals   Yes Historical Provider, MD   pantoprazole (PROTONIX) 40 mg tablet Take 1 tablet (40 mg total) by mouth daily in the early morning 2/8/19  Yes Radha Sanchez MD   [facility]) 0 4 mg Take 1 capsule (0 4 mg total) by mouth daily with dinner 2/7/19  Yes Radha Sanchez MD   amLODIPine (NORVASC) 2 5 mg tablet Take 4 tablets (10 mg total) by mouth daily for 30 days 6/7/19 9/4/19  RIKA Mejia     I have reviewed home medications with patient personally  Allergies: No Known Allergies    Social History:  Marital Status:     Occupation:  None  Patient Pre-hospital Living Situation:  Lives with girlfriend  Patient Pre-hospital Level of Mobility:  Ambulates with walker  Patient Pre-hospital Diet Restrictions:  None  Substance Use History:     Social History     Substance and Sexual Activity   Alcohol Use Never    Frequency: Never     Social History     Tobacco Use   Smoking Status Never Smoker   Smokeless Tobacco Never Used     Social History     Substance and Sexual Activity   Drug Use Never       Family History:  I have reviewed the patients family history    Physical Exam:   Vitals:   Blood Pressure: 150/70 (09/04/19 1010)  Pulse: 90 (09/04/19 0825)  Temperature: (!) 96 4 °F (35 8 °C) (09/04/19 0825)  Temp Source: Tympanic (09/04/19 0825)  Respirations: 22 (09/04/19 0825)  Height: 5' 8" (172 7 cm) (09/04/19 0309)  Weight - Scale: 71 6 kg (157 lb 13 6 oz) (09/04/19 0832)  SpO2: 99 % (09/04/19 0825)    Physical Exam   Constitutional: No distress  Frail elderly  male   HENT:   Head: Normocephalic and atraumatic  Eyes: Conjunctivae and EOM are normal    Neck: Normal range of motion  Neck supple  Cardiovascular: Normal rate and regular rhythm  Pulmonary/Chest: Effort normal  No respiratory distress  Abdominal: Soft  He exhibits no distension  There is tenderness  Musculoskeletal:   Generalized weakness of bilateral lower extremities   Neurological: He is alert  No cranial nerve deficit  Skin: Skin is warm and dry  Psychiatric: He has a normal mood and affect  His behavior is normal        Additional Data:   Lab Results: I have personally reviewed pertinent reports  Results from last 7 days   Lab Units 09/04/19  0441 09/03/19  1104   WBC Thousand/uL 10 80 9 43   HEMOGLOBIN g/dL 10 7* 11 6*   HEMATOCRIT % 31 8* 35 0*   PLATELETS Thousands/uL 161 214   NEUTROS PCT %  --  78*   LYMPHS PCT %  --  10*   LYMPHO PCT % 6*  --    MONOS PCT %  --  10   MONO PCT % 3*  --    EOS PCT %  --  1     Results from last 7 days   Lab Units 09/04/19  0410   POTASSIUM mmol/L 4 0   CHLORIDE mmol/L 110*   CO2 mmol/L 12*   BUN mg/dL 44*   CREATININE mg/dL 2 36*   CALCIUM mg/dL 8 6   ALK PHOS U/L 83   ALT U/L 7   AST U/L 8*         Results from last 7 days   Lab Units 09/04/19  1129 09/04/19  0838   POC GLUCOSE mg/dl 269* 256*           Imaging: I have personally reviewed pertinent reports  CT abdomen pelvis wo contrast   Final Result by Darren Dejesus MD (09/04 2561)      New right-sided moderate hydroureteronephrosis  No evidence of obstructing radiopaque calculus in the urinary tract to account for the finding  No significant change in the size of the partially calcified soft tissue mass in the root of the mesentery suspicious for neuroendocrine/carcinoid tumor        Minimal increase in the size of several small mesenteric and retroperitoneal lymph nodes  Attention on short-term follow-up  Multiple ill-defined hypodense lesions, some of which appear to be less conspicuous on this exam but not well evaluated without intravenous contrast       Moderately distended gallbladder containing sludge  No pericholecystic inflammation  Prostatomegaly  Urinary bladder is decompressed by a Haile catheter, however the degree of bladder wall thickening suggests superimposed chronic bladder outlet obstruction  Workstation performed: [id number]             [facility]/[facility] Records Reviewed: Yes     ** Please Note: This note has been constructed using a voice recognition system   **

## 2019-09-04 NOTE — PLAN OF CARE
Problem: Prexisting or High Potential for Compromised Skin Integrity  Goal: Skin integrity is maintained or improved  Description  INTERVENTIONS:  - Identify patients at risk for skin breakdown  - Assess and monitor skin integrity  - Assess and monitor nutrition and hydration status  - Monitor labs   - Assess for incontinence   - Turn and reposition patient  - Assist with mobility/ambulation  - Relieve pressure over bony prominences  - Avoid friction and shearing  - Provide appropriate hygiene as needed including keeping skin clean and dry  - Evaluate need for skin moisturizer/barrier cream  - Collaborate with interdisciplinary team   - Patient/family teaching  - Consider wound care consult   Outcome: Not Progressing     Problem: Potential for Falls  Goal: Patient will remain free of falls  Description  INTERVENTIONS:  - Assess patient frequently for physical needs  -  Identify cognitive and physical deficits and behaviors that affect risk of falls    -  [place] fall precautions as indicated by assessment   - Educate patient/family on patient safety including physical limitations  - Instruct patient to call for assistance with activity based on assessment  - Modify environment to reduce risk of injury  - Consider OT/PT consult to assist with strengthening/mobility  Outcome: Not Progressing     Problem: GASTROINTESTINAL - ADULT  Goal: Minimal or absence of nausea and/or vomiting  Description  INTERVENTIONS:  - Administer IV fluids if ordered to ensure adequate hydration  - Maintain NPO status until nausea and vomiting are resolved  - Nasogastric tube if ordered  - Administer ordered antiemetic medications as needed  - Provide nonpharmacologic comfort measures as appropriate  - Advance diet as tolerated, if ordered  - Consider nutrition services referral to assist patient with adequate nutrition and appropriate food choices  Outcome: Not Progressing  Goal: Maintains or returns to baseline bowel function  Description  INTERVENTIONS:  - Assess bowel function  - Encourage oral fluids to ensure adequate hydration  - Administer IV fluids if ordered to ensure adequate hydration  - Administer ordered medications as needed  - Encourage mobilization and activity  - Consider nutritional services referral to assist patient with adequate nutrition and appropriate food choices  Outcome: Not Progressing  Goal: Maintains adequate nutritional intake  Description  INTERVENTIONS:  - Monitor percentage of each meal consumed  - Identify factors contributing to decreased intake, treat as appropriate  - Assist with meals as needed  - Monitor I&O, weight, and lab values if indicated  - Obtain nutrition services referral as needed  Outcome: Not Progressing  Goal: Establish and maintain optimal ostomy function  Description  INTERVENTIONS:  - Assess bowel function  - Encourage oral fluids to ensure adequate hydration  - Administer IV fluids if ordered to ensure adequate hydration   - Administer ordered medications as needed  - Encourage mobilization and activity  - Nutrition services referral to assist patient with appropriate food choices  - Assess stoma site  - Consider wound care consult   Outcome: Not Progressing     Problem: GENITOURINARY - ADULT  Goal: Maintains or returns to baseline urinary function  Description  INTERVENTIONS:  - Assess urinary function  - Encourage oral fluids to ensure adequate hydration if ordered  - Administer IV fluids as ordered to ensure adequate hydration  - Administer ordered medications as needed  - Offer frequent toileting  - Follow urinary retention protocol if ordered  Outcome: Not Progressing  Goal: Absence of urinary retention  Description  INTERVENTIONS:  - Assess patients ability to void and empty bladder  - Monitor I/O  - Bladder scan as needed  - Discuss with physician/AP medications to alleviate retention as needed  - Discuss catheterization for long term situations as appropriate  Outcome: Not Progressing  Goal: Urinary catheter remains patent  Description  INTERVENTIONS:  - Assess patency of urinary catheter  - If patient has a chronic garcia, consider changing catheter if non-functioning  - Follow guidelines for intermittent irrigation of non-functioning urinary catheter  Outcome: Not Progressing     Problem: METABOLIC, FLUID AND ELECTROLYTES - ADULT  Goal: Electrolytes maintained within normal limits  Description  INTERVENTIONS:  - Monitor labs and assess patient for signs and symptoms of electrolyte imbalances  - Administer electrolyte replacement as ordered  - Monitor response to electrolyte replacements, including repeat lab results as appropriate  - Instruct patient on fluid and nutrition as appropriate  Outcome: Not Progressing  Goal: Fluid balance maintained  Description  INTERVENTIONS:  - Monitor labs   - Monitor I/O and WT  - Instruct patient on fluid and nutrition as appropriate  - Assess for signs & symptoms of volume excess or deficit  Outcome: Not Progressing  Goal: Glucose maintained within target range  Description  INTERVENTIONS:  - Monitor Blood Glucose as ordered  - Assess for signs and symptoms of hyperglycemia and hypoglycemia  - Administer ordered medications to maintain glucose within target range  - Assess nutritional intake and initiate nutrition service referral as needed  Outcome: Not Progressing     Problem: SKIN/TISSUE INTEGRITY - ADULT  Goal: Skin integrity remains intact  Description  INTERVENTIONS  - Identify patients at risk for skin breakdown  - Assess and monitor skin integrity  - Assess and monitor nutrition and hydration status  - Monitor labs (i e  albumin)  - Assess for incontinence   - Turn and reposition patient  - Assist with mobility/ambulation  - Relieve pressure over bony prominences  - Avoid friction and shearing  - Provide appropriate hygiene as needed including keeping skin clean and dry  - Evaluate need for skin moisturizer/barrier cream  - Collaborate with interdisciplinary team (i e  Nutrition, Rehabilitation, etc )   - Patient/family teaching  Outcome: Not Progressing  Goal: Incision(s), wounds(s) or drain site(s) healing without S/S of infection  Description  INTERVENTIONS  - Assess and document risk factors for skin impairment   - Assess and document dressing, incision, wound bed, drain sites and surrounding tissue  - Consider nutrition services referral as needed  - Oral mucous membranes remain intact  - Provide patient/ family education  Outcome: Not Progressing  Goal: Oral mucous membranes remain intact  Description  INTERVENTIONS  - Assess oral mucosa and hygiene practices  - Implement preventative oral hygiene regimen  - Implement oral medicated treatments as ordered  - Initiate Nutrition services referral as needed  Outcome: Not Progressing     Problem: PAIN - ADULT  Goal: Verbalizes/displays adequate comfort level or baseline comfort level  Description  Interventions:  - Encourage patient to monitor pain and request assistance  - Assess pain using appropriate pain scale  - Administer analgesics based on type and severity of pain and evaluate response  - Implement non-pharmacological measures as appropriate and evaluate response  - Consider cultural and social influences on pain and pain management  - Notify physician/advanced practitioner if interventions unsuccessful or patient reports new pain  Outcome: Not Progressing     Problem: INFECTION - ADULT  Goal: Absence or prevention of progression during hospitalization  Description  INTERVENTIONS:  - Assess and monitor for signs and symptoms of infection  - Monitor lab/diagnostic results  - Monitor all insertion sites, i e  indwelling lines, tubes, and drains  - Monitor endotracheal if appropriate and nasal secretions for changes in amount and color  - [place] appropriate cooling/warming therapies per order  - Administer medications as ordered  - Instruct and encourage patient and family to use good hand hygiene technique  - Identify and instruct in appropriate isolation precautions for identified infection/condition  Outcome: Not Progressing  Goal: Absence of fever/infection during neutropenic period  Description  INTERVENTIONS:  - Monitor WBC    Outcome: Not Progressing     Problem: SAFETY ADULT  Goal: Maintain or return to baseline ADL function  Description  INTERVENTIONS:  -  Assess patient's ability to carry out ADLs; assess patient's baseline for ADL function and identify physical deficits which impact ability to perform ADLs (bathing, care of mouth/teeth, toileting, grooming, dressing, etc )  - Assess/evaluate cause of self-care deficits   - Assess range of motion  - Assess patient's mobility; develop plan if impaired  - Assess patient's need for assistive devices and provide as appropriate  - Encourage maximum independence but intervene and supervise when necessary  - Involve family in performance of ADLs  - Assess for home care needs following discharge   - Consider OT consult to assist with ADL evaluation and planning for discharge  - Provide patient education as appropriate  Outcome: Not Progressing  Goal: Maintain or return mobility status to optimal level  Description  INTERVENTIONS:  - Assess patient's baseline mobility status (ambulation, transfers, stairs, etc )    - Identify cognitive and physical deficits and behaviors that affect mobility  - Identify mobility aids required to assist with transfers and/or ambulation (gait belt, sit-to-stand, lift, walker, cane, etc )  - [place] fall precautions as indicated by assessment  - Record patient progress and toleration of activity level on Mobility SBAR; progress patient to next Phase/Stage  - Instruct patient to call for assistance with activity based on assessment  - Consider rehabilitation consult to assist with strengthening/weightbearing, etc   Outcome: Not Progressing     Problem: DISCHARGE PLANNING  Goal: Discharge to home or other facility with appropriate resources  Description  INTERVENTIONS:  - Identify barriers to discharge w/patient and caregiver  - Arrange for needed discharge resources and transportation as appropriate  - Identify discharge learning needs (meds, wound care, etc )  - Arrange for interpretive services to assist at discharge as needed  - Refer to Case Management Department for coordinating discharge planning if the patient needs post-hospital services based on physician/advanced practitioner order or complex needs related to functional status, cognitive ability, or social support system  Outcome: Not Progressing     Problem: Knowledge Deficit  Goal: Patient/family/caregiver demonstrates understanding of disease process, treatment plan, medications, and discharge instructions  Description  Complete learning assessment and assess knowledge base    Interventions:  - Provide teaching at level of understanding  - Provide teaching via preferred learning methods  Outcome: Not Progressing

## 2019-09-04 NOTE — ED PROVIDER NOTES
History  Chief Complaint   Patient presents with    Abdominal Pain     diffuse and began 2000 yesterday, hx of liver cancer, currently undergoing chemo injections    Vomiting     x2 since dinner yesterday     [de-identified]year-old with liver cancer has been getting chemotherapy, presents with nausea and vomiting x2  Increasing abdominal pain since 8:00 a m  Last evening  Pain is severe, nonradiating and described as stabbing in nature  He had a normal bowel movement this morning  He denies fever or chills, just the abdominal pain  Prior to Admission Medications   Prescriptions Last Dose Informant Patient Reported? Taking?    amLODIPine (NORVASC) 2 5 mg tablet   No No   Sig: Take 4 tablets (10 mg total) by mouth daily for 30 days   aspirin 81 MG tablet  Self Yes No   Sig: Take 81 mg by mouth daily   b complex-vitamin C-folic acid (NEPHROCAPS) 1 mg capsule   No No   Sig: Take 1 capsule by mouth daily with dinner   cholecalciferol (VITAMIN D3) 1,000 units tablet   Yes No   Sig: Take 1,000 Units by mouth daily   colchicine (COLCRYS) 0 6 mg tablet   Yes No   Sig: Take 0 6 mg by mouth daily   insulin detemir (LEVEMIR) 100 units/mL subcutaneous injection   Yes No   Sig: Inject 10 Units under the skin daily at bedtime   insulin lispro (HumaLOG) 100 units/mL injection   Yes No   Sig: Inject under the skin 3 (three) times a day before meals   methenamine hippurate (HIPREX) 1 g tablet   Yes No   Sig: Take 1 g by mouth 2 (two) times a day with meals   pantoprazole (PROTONIX) 40 mg tablet  Self No No   Sig: Take 1 tablet (40 mg total) by mouth daily in the early morning   tamsulosin (FLOMAX) 0 4 mg  Self No No   Sig: Take 1 capsule (0 4 mg total) by mouth daily with dinner      Facility-Administered Medications: None       Past Medical History:   Diagnosis Date    BPH (benign prostatic hyperplasia)     Cancer (HCC)     liver cancer    Cardiac disease     Coronary artery disease     Diabetes mellitus ([facility] [address] )     TERESSA (degenerative joint disease)     Gait disturbance     Generalized weakness     GERD (gastroesophageal reflux disease)     Gout     Hyperlipidemia     Hypertension     Renal disorder        Past Surgical History:   Procedure Laterality Date    CORONARY ANGIOPLASTY WITH STENT PLACEMENT      IR IMAGE GUIDED BIOPSY/ASPIRATION LIVER  1/24/2019       Family History   Problem Relation Age of Onset    Cancer Mother     Hypertension Father     Cancer Brother     Cancer Maternal Grandmother     Cancer Paternal Aunt      I have reviewed and agree with the history as documented  Social History     Tobacco Use    Smoking status: Never Smoker    Smokeless tobacco: Never Used   Substance Use Topics    Alcohol use: Never     Frequency: Never    Drug use: Never        Review of Systems   Constitutional: Negative for chills and fever  HENT: Negative for ear pain, rhinorrhea and sore throat  Eyes: Negative for pain, redness and visual disturbance  Respiratory: Negative for cough and shortness of breath  Cardiovascular: Negative for chest pain and leg swelling  Gastrointestinal: Positive for abdominal pain, nausea and vomiting  Negative for diarrhea  Genitourinary: Negative for dysuria, flank pain, frequency and urgency  Musculoskeletal: Positive for back pain  Skin: Negative for rash  Neurological: Positive for weakness  Negative for dizziness, light-headedness and headaches  Hematological: Negative  Psychiatric/Behavioral: Negative for agitation, confusion and suicidal ideas  The patient is not nervous/anxious  All other systems reviewed and are negative  Physical Exam  Physical Exam   Constitutional: He is oriented to person, place, and time  He appears ill  HENT:   Nose: Nose normal    Mouth/Throat: Oropharynx is clear and moist  No oropharyngeal exudate  Eyes: Pupils are equal, round, and reactive to light  Conjunctivae and EOM are normal  No scleral icterus     Neck: Normal range of motion  Neck supple  No JVD present  No tracheal deviation present  Cardiovascular: Normal rate, regular rhythm and normal heart sounds  No murmur heard  Pulmonary/Chest: Effort normal and breath sounds normal  No respiratory distress  He has no wheezes  He has no rales  Abdominal: Soft  Bowel sounds are normal  There is tenderness in the epigastric area and periumbilical area  There is no guarding  Musculoskeletal: Normal range of motion  He exhibits no edema or tenderness  Neurological: He is alert and oriented to person, place, and time  No cranial nerve deficit or sensory deficit  He exhibits normal muscle tone  5/5 motor, nl sens   Skin: Skin is warm and dry  There is pallor  Psychiatric: He has a normal mood and affect  His behavior is normal    Nursing note and vitals reviewed  Vital Signs  ED Triage Vitals [09/04/19 0309]   Temperature Pulse Respirations Blood Pressure SpO2   97 9 °F (36 6 °C) 67 20 (!) 178/81 100 %      Temp Source Heart Rate Source Patient Position - Orthostatic VS BP Location FiO2 (%)   Temporal Monitor Lying Left arm --      Pain Score       Worst Possible Pain           Vitals:    09/04/19 0309   BP: (!) 178/81   Pulse: 67   Patient Position - Orthostatic VS: Lying         Visual Acuity      ED Medications  Medications - No data to display    Diagnostic Studies  Results Reviewed     None                 No orders to display              Procedures  Procedures       ED Course  ED Course as of Sep 04 0658   Wed Sep 04, 2019   0618                            CT abdomen pelvis wo contrast   0997 Attempt in the head touch with patient's primary care doctor Rebecca to determine dispostion      0648 Patient's pain is controlled        7538 Will admit the hospitalist service per the request of Dr Deandre Wagoner his primary care physician                                  MDM    Disposition  Final diagnoses:   None     ED Disposition     None      Follow-up Information None         Patient's Medications   Discharge Prescriptions    No medications on file     No discharge procedures on file      ED Provider  Electronically Signed by           Robe Roper DO  09/04/19 8475

## 2019-09-04 NOTE — ASSESSMENT & PLAN NOTE
· Patient with chronic urinary retention and chronic Haile catheter  · Will continue with tamsulosin  · IV fluids  · CT scan of the abdomen/pelvis suggested new hydroureteronephrosis, will follow up with Urology  · Monitor urine output  · Nephrology input appreciated

## 2019-09-05 PROBLEM — R33.9 URINARY RETENTION: Status: ACTIVE | Noted: 2019-09-05

## 2019-09-05 PROBLEM — D50.8 IRON DEFICIENCY ANEMIA SECONDARY TO INADEQUATE DIETARY IRON INTAKE: Status: ACTIVE | Noted: 2019-01-24

## 2019-09-05 LAB
ANION GAP SERPL CALCULATED.3IONS-SCNC: 12 MMOL/L (ref 4–13)
BUN SERPL-MCNC: 35 MG/DL (ref 7–25)
CALCIUM SERPL-MCNC: 9.3 MG/DL (ref 8.6–10.5)
CHLORIDE SERPL-SCNC: 109 MMOL/L (ref 98–107)
CO2 SERPL-SCNC: 18 MMOL/L (ref 21–31)
CREAT SERPL-MCNC: 2.04 MG/DL (ref 0.7–1.3)
ERYTHROCYTE [DISTWIDTH] IN BLOOD BY AUTOMATED COUNT: 15.4 % (ref 11.5–14.5)
GFR SERPL CREATININE-BSD FRML MDRD: 30 ML/MIN/1.73SQ M
GLUCOSE SERPL-MCNC: 247 MG/DL (ref 65–140)
GLUCOSE SERPL-MCNC: 267 MG/DL (ref 65–99)
GLUCOSE SERPL-MCNC: 275 MG/DL (ref 65–140)
GLUCOSE SERPL-MCNC: 318 MG/DL (ref 65–140)
HCT VFR BLD AUTO: 35.9 % (ref 42–47)
HGB BLD-MCNC: 12 G/DL (ref 14–18)
MCH RBC QN AUTO: 29.5 PG (ref 26–34)
MCHC RBC AUTO-ENTMCNC: 33.4 G/DL (ref 31–37)
MCV RBC AUTO: 88 FL (ref 81–99)
PLATELET # BLD AUTO: 237 THOUSANDS/UL (ref 149–390)
PMV BLD AUTO: 8.6 FL (ref 8.6–11.7)
POTASSIUM SERPL-SCNC: 3.7 MMOL/L (ref 3.5–5.5)
RBC # BLD AUTO: 4.07 MILLION/UL (ref 4.3–5.9)
SODIUM SERPL-SCNC: 139 MMOL/L (ref 134–143)
WBC # BLD AUTO: 23.5 THOUSAND/UL (ref 4.8–10.8)

## 2019-09-05 PROCEDURE — 82948 REAGENT STRIP/BLOOD GLUCOSE: CPT

## 2019-09-05 PROCEDURE — 80048 BASIC METABOLIC PNL TOTAL CA: CPT | Performed by: PHYSICIAN ASSISTANT

## 2019-09-05 PROCEDURE — 99232 SBSQ HOSP IP/OBS MODERATE 35: CPT | Performed by: NURSE PRACTITIONER

## 2019-09-05 PROCEDURE — 85027 COMPLETE CBC AUTOMATED: CPT | Performed by: PHYSICIAN ASSISTANT

## 2019-09-05 PROCEDURE — 99232 SBSQ HOSP IP/OBS MODERATE 35: CPT | Performed by: SPECIALIST

## 2019-09-05 PROCEDURE — 99232 SBSQ HOSP IP/OBS MODERATE 35: CPT | Performed by: INTERNAL MEDICINE

## 2019-09-05 RX ADMIN — Medication 1 CAPSULE: at 15:53

## 2019-09-05 RX ADMIN — TAMSULOSIN HYDROCHLORIDE 0.4 MG: 0.4 CAPSULE ORAL at 15:54

## 2019-09-05 RX ADMIN — HEPARIN SODIUM 5000 UNITS: 5000 INJECTION INTRAVENOUS; SUBCUTANEOUS at 22:52

## 2019-09-05 RX ADMIN — VITAMIN D, TAB 1000IU (100/BT) 1000 UNITS: 25 TAB at 09:26

## 2019-09-05 RX ADMIN — PIPERACILLIN SODIUM AND TAZOBACTAM SODIUM 3.38 G: 3; .375 INJECTION, POWDER, LYOPHILIZED, FOR SOLUTION INTRAVENOUS at 22:51

## 2019-09-05 RX ADMIN — COLCHICINE 0.6 MG: 0.6 TABLET, FILM COATED ORAL at 09:26

## 2019-09-05 RX ADMIN — INSULIN LISPRO 3 UNITS: 100 INJECTION, SOLUTION INTRAVENOUS; SUBCUTANEOUS at 15:53

## 2019-09-05 RX ADMIN — ASPIRIN 81 MG 81 MG: 81 TABLET ORAL at 09:25

## 2019-09-05 RX ADMIN — METHENAMINE HIPPURATE 1 G: 1 TABLET ORAL at 15:54

## 2019-09-05 RX ADMIN — ONDANSETRON 4 MG: 2 INJECTION INTRAMUSCULAR; INTRAVENOUS at 06:03

## 2019-09-05 RX ADMIN — HYDROMORPHONE HYDROCHLORIDE 0.5 MG: 1 INJECTION, SOLUTION INTRAMUSCULAR; INTRAVENOUS; SUBCUTANEOUS at 18:26

## 2019-09-05 RX ADMIN — HYDROMORPHONE HYDROCHLORIDE 0.5 MG: 1 INJECTION, SOLUTION INTRAMUSCULAR; INTRAVENOUS; SUBCUTANEOUS at 05:45

## 2019-09-05 RX ADMIN — INSULIN LISPRO 2 UNITS: 100 INJECTION, SOLUTION INTRAVENOUS; SUBCUTANEOUS at 22:51

## 2019-09-05 RX ADMIN — HYDROMORPHONE HYDROCHLORIDE 0.5 MG: 1 INJECTION, SOLUTION INTRAMUSCULAR; INTRAVENOUS; SUBCUTANEOUS at 09:28

## 2019-09-05 RX ADMIN — INSULIN LISPRO 2 UNITS: 100 INJECTION, SOLUTION INTRAVENOUS; SUBCUTANEOUS at 09:38

## 2019-09-05 RX ADMIN — PIPERACILLIN SODIUM AND TAZOBACTAM SODIUM 3.38 G: 3; .375 INJECTION, POWDER, LYOPHILIZED, FOR SOLUTION INTRAVENOUS at 15:53

## 2019-09-05 RX ADMIN — SODIUM CHLORIDE 100 ML/HR: 9 INJECTION, SOLUTION INTRAVENOUS at 18:29

## 2019-09-05 RX ADMIN — HEPARIN SODIUM 5000 UNITS: 5000 INJECTION INTRAVENOUS; SUBCUTANEOUS at 13:08

## 2019-09-05 RX ADMIN — OXYCODONE HYDROCHLORIDE AND ACETAMINOPHEN 1 TABLET: 5; 325 TABLET ORAL at 23:03

## 2019-09-05 RX ADMIN — PANTOPRAZOLE SODIUM 40 MG: 40 TABLET, DELAYED RELEASE ORAL at 05:47

## 2019-09-05 RX ADMIN — OXYCODONE HYDROCHLORIDE AND ACETAMINOPHEN 1 TABLET: 5; 325 TABLET ORAL at 17:04

## 2019-09-05 RX ADMIN — METHENAMINE HIPPURATE 1 G: 1 TABLET ORAL at 09:31

## 2019-09-05 RX ADMIN — PIPERACILLIN SODIUM AND TAZOBACTAM SODIUM 3.38 G: 3; .375 INJECTION, POWDER, LYOPHILIZED, FOR SOLUTION INTRAVENOUS at 11:32

## 2019-09-05 RX ADMIN — HEPARIN SODIUM 5000 UNITS: 5000 INJECTION INTRAVENOUS; SUBCUTANEOUS at 05:47

## 2019-09-05 RX ADMIN — HYDROMORPHONE HYDROCHLORIDE 0.5 MG: 1 INJECTION, SOLUTION INTRAMUSCULAR; INTRAVENOUS; SUBCUTANEOUS at 13:11

## 2019-09-05 RX ADMIN — AMLODIPINE BESYLATE 10 MG: 5 TABLET ORAL at 09:25

## 2019-09-05 RX ADMIN — INSULIN LISPRO 3 UNITS: 100 INJECTION, SOLUTION INTRAVENOUS; SUBCUTANEOUS at 11:32

## 2019-09-05 NOTE — PROGRESS NOTES
Progress Note - General Surgery   Burnis Gails [de-identified] y o  male MRN: [id number]  Unit/Bed#: -02 Encounter: [id number]    Assessment:  Pt appears more alert and comfortable this morning  Apparently ate breakfast, which then resulted in Nuclear Medicine canceling the HIDA scan  With Marked Leukocytosis this morning  Plan:  Begin IV antibiotics  HIDA scan in am  If positive, recommend percutaneous cholecystostomy    Subjective/Objective   Chief Complaint: Abdominal Pain    Subjective: Appears more alert and comfortable    Objective:     Blood pressure 140/87, pulse 91, temperature 97 5 °F (36 4 °C), temperature source Tympanic, resp  rate 18, height 5' 8" (1 727 m), weight 70 kg (154 lb 5 2 oz), SpO2 98 %  ,Body mass index is 23 46 kg/m²        Intake/Output Summary (Last 24 hours) at 9/5/2019 1122  Last data filed at 9/5/2019 0617  Gross per 24 hour   Intake    Output 3400 ml   Net -3400 ml       Invasive Devices     Peripheral Intravenous Line            Peripheral IV 09/04/19 Right Arm 1 day          Drain            Urethral Catheter Latex 16 Fr  68 days                Physical Exam:   In no acute distress  Abdomen soft, tender in RUQ, clinically negative Siu's sign    Lab, Imaging and other studies:  CBC:   Lab Results   Component Value Date    WBC 23 50 (H) 09/05/2019    HGB 12 0 (L) 09/05/2019    HCT 35 9 (L) 09/05/2019    MCV 88 09/05/2019     09/05/2019    MCH 29 5 09/05/2019    MCHC 33 4 09/05/2019    RDW 15 4 (H) 09/05/2019    MPV 8 6 09/05/2019   , CMP:   Lab Results   Component Value Date    SODIUM 139 09/05/2019    K 3 7 09/05/2019     (H) 09/05/2019    CO2 18 (L) 09/05/2019    BUN 35 (H) 09/05/2019    CREATININE 2 04 (H) 09/05/2019    CALCIUM 9 3 09/05/2019    EGFR 30 09/05/2019     VTE Pharmacologic Prophylaxis: Heparin  VTE Mechanical Prophylaxis: sequential compression device

## 2019-09-05 NOTE — ASSESSMENT & PLAN NOTE
· Possibly secondary to acute renal failure  · This is improving   · Continue IV fluids  · Nephrology input appreciated

## 2019-09-05 NOTE — ASSESSMENT & PLAN NOTE
· Likely multifactorial  · No signs of acute bleeding  · Will monitor H&H and transfuse as needed  · H&H stable

## 2019-09-05 NOTE — PROGRESS NOTES
Progress Note - Franco Carrel 1940, [de-identified] y o  male MRN: [id number]    Unit/Bed#: -02 Encounter: [id number]    Primary Care Provider: Terrence Rojas DO   Date and time admitted to hospital: 9/4/2019  3:03 AM        * Generalized abdominal pain  Assessment & Plan  · Suspected due to cholecystitis with marked leukocytosis this AM   · abx started   · US RUQ shows sludge, HIDA scan is pending   · Will continue pain control as needed  · CT scan of the abdomen/pelvis results reviewed      Urinary retention  Assessment & Plan  · With obstructive uropathy and chronic garcia   · Urology input appreciated   · CT shows suspected right hydronephrosis  Right upper quadrant ultrasound shows normal right kidney  Hydronephrosis is resolved  · Continue supportive measures at this time no urological intervention needed    Acute kidney injury superimposed on chronic kidney disease [facility])  Assessment & Plan  · Patient with chronic urinary retention and chronic Garcia catheter  SILVERIO is due to ATN     · Will continue with tamsulosin  · IV fluids  · CT scan of the abdomen/pelvis suggested new hydroureteronephrosis  · Monitor urine output  · Nephrology input appreciated  · Avoid nephrotoxins    Neuroendocrine tumor  Assessment & Plan  · Patient currently on monthly chemo injections  · Continue outpatient follow-up with Dr Darian Moseley  · Continue supportive measures    Accelerated hypertension  Assessment & Plan  · Pain is contributing to this as well  · BP has improved from admission  · Will continue home meds  · Hydralazine as needed   · Monitor BP closely     Iron deficiency anemia secondary to inadequate dietary iron intake  Assessment & Plan  · Likely multifactorial  · No signs of acute bleeding  · Will monitor H&H and transfuse as needed  · H&H stable     Metabolic acidosis  Assessment & Plan  · Possibly secondary to acute renal failure  · This is improving   · Continue IV fluids  · Nephrology input appreciated      VTE Pharmacologic Prophylaxis: Pharmacologic: Heparin    Patient Centered Rounds: I have performed bedside rounds with nursing staff today  Discussions with Specialists or Other Care Team Provider: surgery, nephrology, urology, CM, nursing, PT/OT   Education and Discussions with Family / Patient: patient and significant other     Current Length of Stay: 1 day(s)    Current Patient Status: Inpatient   Certification Statement: The patient will continue to require additional inpatient hospital stay due to abdominal pain     Discharge Plan: pending hospital course     Code Status: Level 1 - Full Code    Subjective:   Having a lot of pain when trying to take deep breath  No n/v      Objective:     Vitals:   Temp (24hrs), Av 3 °F (36 8 °C), Min:97 5 °F (36 4 °C), Max:99 7 °F (37 6 °C)    Temp:  [97 5 °F (36 4 °C)-99 7 °F (37 6 °C)] 99 7 °F (37 6 °C)  HR:  [90-98] 98  Resp:  [18] 18  BP: (140-175)/(74-87) 175/80  SpO2:  [96 %-98 %] 96 %  Body mass index is 23 46 kg/m²  Input and Output Summary (last 24 hours): Intake/Output Summary (Last 24 hours) at 2019 1613  Last data filed at 2019 1444  Gross per 24 hour   Intake 2100 ml   Output 3100 ml   Net -1000 ml       Physical Exam:     Physical Exam   Constitutional: He is oriented to person, place, and time  He appears well-developed  No distress  Frail appearing, cachetic      HENT:   Head: Normocephalic and atraumatic  Mouth/Throat: Oropharynx is clear and moist    Eyes: Pupils are equal, round, and reactive to light  Conjunctivae and EOM are normal    Neck: Normal range of motion  Neck supple  No thyromegaly present  Cardiovascular: Normal rate, regular rhythm, normal heart sounds and intact distal pulses  Pulmonary/Chest: Effort normal and breath sounds normal  No respiratory distress  He has no wheezes  Abdominal: Soft  Bowel sounds are normal  He exhibits distension  There is no tenderness  There is guarding     Musculoskeletal: Normal range of motion  He exhibits no edema or deformity  Neurological: He is alert and oriented to person, place, and time  He has normal reflexes  No cranial nerve deficit  Skin: Skin is warm and dry  No erythema  Psychiatric: He has a normal mood and affect  His behavior is normal  Thought content normal    Vitals reviewed  Additional Data:     Labs:    Results from last 7 days   Lab Units 09/05/19  0558 09/04/19  0441 09/03/19  1104   WBC Thousand/uL 23 50* 10 80 9 43   HEMOGLOBIN g/dL 12 0* 10 7* 11 6*   HEMATOCRIT % 35 9* 31 8* 35 0*   PLATELETS Thousands/uL 237 161 214   NEUTROS PCT %  --   --  78*   LYMPHS PCT %  --   --  10*   LYMPHO PCT %  --  6*  --    MONOS PCT %  --   --  10   MONO PCT %  --  3*  --    EOS PCT %  --   --  1     Results from last 7 days   Lab Units 09/05/19  0558  09/04/19  0410   POTASSIUM mmol/L 3 7   < > 4 0   CHLORIDE mmol/L 109*   < > 110*   CO2 mmol/L 18*   < > 12*   BUN mg/dL 35*   < > 44*   CREATININE mg/dL 2 04*   < > 2 36*   CALCIUM mg/dL 9 3   < > 8 6   ALK PHOS U/L  --   --  83   ALT U/L  --   --  7   AST U/L  --   --  8*    < > = values in this interval not displayed  Results from last 7 days   Lab Units 09/05/19  1553 09/05/19  1128 09/05/19  0640 09/04/19  2011 09/04/19  1612 09/04/19  1129 09/04/19  0838   POC GLUCOSE mg/dl 275* 318* 247* 237* 271* 269* 256*           * I Have Reviewed All Lab Data Listed Above  * Additional Pertinent Lab Tests Reviewed:  Laura 65 Admission  Reviewed    Imaging:  Imaging Reports Reviewed Today Include: US RUQ     Recent Cultures (last 7 days):           Last 24 Hours Medication List:     Current Facility-Administered Medications:  acetaminophen 650 mg Oral Q6H PRN RL Zuluaga-C    amLODIPine 10 mg Oral Daily RL Zuluaga-FOUZIA    aspirin 81 mg Oral Daily Rakan Pepe PA-C    b complex-vitamin C-folic acid 1 capsule Oral Daily With [address], PA-C    cholecalciferol 1,000 Units Oral Daily Nikia Fitting Deedee Azevedo PA-C    colchicine 0 6 mg Oral Daily Edilma Parsons PA-C    heparin (porcine) 5,000 Units Subcutaneous [facility] Edilma Parsons PA-C    HYDROmorphone 0 5 mg Intravenous Q3H PRN RIKA Hudson    insulin lispro 1-5 Units Subcutaneous TID AC Marden Lombard, MD    insulin lispro 1-5 Units Subcutaneous HS Edilma Parsons PA-C    methenamine hippurate 1 g Oral BID With Meals Edilma Parsons PA-C    ondansetron 4 mg Intravenous Q6H PRN Edilma Parsons PA-C    oxyCODONE-acetaminophen 1 tablet Oral Q6H PRN RIKA Hudson    pantoprazole 40 mg Oral Early Morning Edilma Parsons PA-C    piperacillin-tazobactam 3 375 g Intravenous Q6H Rubi Mccrary MD Last Rate: 3 375 g (09/05/19 1553)   sodium chloride 100 mL/hr Intravenous Continuous Torey Mehta DO Last Rate: 100 mL/hr (09/05/19 2113)   tamsulosin 0 4 mg Oral Daily With Dinner Edilma Parsons PA-C         Today, Patient Was Seen By: RIKA Hudson    ** Please Note: Dictation voice to text software may have been used in the creation of this document   **

## 2019-09-05 NOTE — ASSESSMENT & PLAN NOTE
· Pain is contributing to this as well  · BP has improved from admission  · Will continue home meds  · Hydralazine as needed   · Monitor BP closely

## 2019-09-05 NOTE — SOCIAL WORK
Chart reviewed by case management, assessment was completed at the bedside with the patient and the family present, [place], basement steps they do not use, pt has 1 step from the garage into the home, pt lives with his significant other, she helps with cooking cleaning , shopping, pt has a rx plan at [employer] , pt has canes, walker, shower chair and a w/c, pt is active with [facility] per [facility], se plans to transport the pt home when stable for d/c today as discussed at care coordination rounds , pt is for a hida scan tomorrow, cm carlos continue to follow and reassess d/c needs an dd/c plan,   Patient/caregiver received discharge checklist   Content reviewed  Patient/caregiver encouraged to participate in discharge plan of care prior to     CM reviewed d/c planning process including the following: identifying help at home, patient preference for d/c planning needs, availability of treatment team to discuss questions or concerns patient and/or family may have regarding understanding medications and recognizing signs and symptoms once discharged  CM also encouraged patient to follow up with all recommended appointments after discharge  Patient advised of importance for patient and family to participate in managing patients medical well being  discharge home

## 2019-09-05 NOTE — ASSESSMENT & PLAN NOTE
· Patient currently on monthly chemo injections  · Continue outpatient follow-up with Dr Anne Marie Holt  · Continue supportive measures

## 2019-09-05 NOTE — UTILIZATION REVIEW
Initial Clinical Review    Admission: Date/Time/Statement: Inpatient Admission Orders (From admission, onward)     Ordered        09/04/19 0658  Inpatient Admission (expected length of stay for this patient Order details is greater than two midnights)  Once                   Orders Placed This Encounter   Procedures    Inpatient Admission (expected length of stay for this patient Order details is greater than two midnights)     Standing Status:   Standing     Number of Occurrences:   1     Order Specific Question:   Admitting Physician     Answer:   Hayes Schrader [[id number]]     Order Specific Question:   Level of Care     Answer:   Med Surg [16]     Order Specific Question:   Estimated length of stay     Answer:   More than 2 Midnights     Order Specific Question:   Certification     Answer:   I certify that inpatient services are medically necessary for this patient for a duration of greater than two midnights  See H&P and MD Progress Notes for additional information about the patient's course of treatment  ED Arrival Information     Expected Arrival Acuity Means of Arrival Escorted By Service Admission Type    - 9/4/2019 03:01 Urgent Ambulance [facility] Urgent    Arrival Complaint    Vomiting        Chief Complaint   Patient presents with    Abdominal Pain     diffuse and began 2000 yesterday, hx of liver cancer, currently undergoing chemo injections    Vomiting     x2 since dinner yesterday     Assessment/Plan: [de-identified] y/o male presents to ED from home by EMS with nausea, vomiting, increasing abd pain since 8 pm last evening  Reports pain is severe, nonradiating, stabbing  Hx metastatic neuroendocrine tumor, 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  On exam, tenderness in epigastric and periumbilical areas  Admitted as inpatient due to generalized abdominal pain, SILVERIO on CKD, accelerated HTN, metabolic acidosis  Abd pain possibly secondary to new R hydroureteronephrosis, also has biliary sludge    Pain control  Urology consult  Chronic indwelling garcia for urinary retention  Continue flomax  Continue IVF  Nephrology consult  PRN hydralazine  9/4 Nephrology consult:  SILVERIO on CKD 3 with baseline Cr 1 4-1 5  Likely due to volume depletion along with new R hydroureter  Increase IVF to 125/hr  Probable diabetic nephropathy  Acidosis most likely due to diarrhea  Bicarb if needed  9/4 Urology consult:  New R hydronephrosis most likely 2/2 partial extrinsic compression of distal R ureter due to known RLQ mass  Continue indwelling garcia  Surgical intervention not recommended unless renal function significantly deteriorates  If that should occur, nephrostomy tube placement would be the preferred modality due to the history of lower abdominal mass  9/4 General surgery consult:  Imaging shows distended gallbladder, sludge, gallstones  HIDA scan to differentiate tumor pain from acute cholecystitis  If positive, proceed to percutaneous cholecystostomy  9/5 Renal function stable  Fraction excretion of sodium is >2%  Pt in ATN  Avoid nephrotoxins including IV contrast   Continues to c/o significant abd pain  Diffuse tenderness on exam, especially in epigastric and RUQ      ED Triage Vitals [09/04/19 0309]   Temperature Pulse Respirations Blood Pressure SpO2   97 9 °F (36 6 °C) 67 20 (!) 178/81 100 %      Temp Source Heart Rate Source Patient Position - Orthostatic VS BP Location FiO2 (%)   Temporal Monitor Lying Left arm --      Pain Score       Worst Possible Pain        Wt Readings from Last 1 Encounters:   09/05/19 70 kg (154 lb 5 2 oz)     Additional Vital Signs:   09/05/19 0801 97 5 °F (36 4 °C) 91 18 140/87 98 % None (Room air)   09/04/19 2147 97 7 °F (36 5 °C) 90 18 142/74 98 % None (Room air)   09/04/19 1700    148/78     09/04/19 1534 97 1 °F (36 2 °C) 92 20 178/90Abnormal  98 % None (Room air)   09/04/19 1010    150/70     09/04/19 0825 96 4 °F (35 8 °C) 90 22 155/77 99 % None (Room air) 09/04/19 0700  85  189/88Abnormal  99 %    09/04/19 0645  86   99 %    09/04/19 0630  84  188/75Abnormal  99 %    09/04/19 0615  84  193/86Abnormal  99 %    09/04/19 0600  83   99 %    09/04/19 0545  81  172/81Abnormal  99 %    09/04/19 0515     97 %    09/04/19 0500  78  161/75 97 %    09/04/19 0445  74  198/89Abnormal   98 %    09/04/19 0430  69  195/109Abnormal  87 %Abnormal     09/04/19 0415  73  188/81Abnormal  100 %        Pertinent Labs/Diagnostic Test Results:   Results from last 7 days   Lab Units 09/05/19  0558 09/04/19  0441 09/03/19  1104   WBC Thousand/uL 23 50* 10 80 9 43   HEMOGLOBIN g/dL 12 0* 10 7* 11 6*   HEMATOCRIT % 35 9* 31 8* 35 0*   PLATELETS Thousands/uL 237 161 214   NEUTROS ABS Thousands/µL  --   --  7 37   TOTAL NEUT ABS Thousand/uL  --  9 83*  --      Results from last 7 days   Lab Units 09/05/19  0558 09/04/19  1706 09/04/19  0410 09/03/19  1104   SODIUM mmol/L 139 137 135 141   POTASSIUM mmol/L 3 7 5 1 4 0 4 5   CHLORIDE mmol/L 109* 108* 110* 113*   CO2 mmol/L 18* 17* 12* 18*   ANION GAP mmol/L 12 12 13 10   BUN mg/dL 35* 37* 44* 47*   CREATININE mg/dL 2 04* 2 05* 2 36* 2 72*   EGFR ml/min/1 73sq m 30 30 25 21   CALCIUM mg/dL 9 3 9 3 8 6 9 5     Results from last 7 days   Lab Units 09/04/19  0410 09/03/19  1104   AST U/L 8* 8   ALT U/L 7 15   ALK PHOS U/L 83 125*   TOTAL PROTEIN g/dL 6 9 7 9   ALBUMIN g/dL 3 7 3 6   TOTAL BILIRUBIN mg/dL 0 50 0 50     Results from last 7 days   Lab Units 09/05/19  0640 09/04/19 2011 09/04/19  1612 09/04/19  1129 09/04/19  0838   POC GLUCOSE mg/dl 247* 237* 271* 269* 256*     Results from last 7 days   Lab Units 09/05/19  0558 09/04/19  1706 09/04/19  0410 09/03/19  1104   GLUCOSE RANDOM mg/dL 267* 257* 219* 230*     Results from last 7 days   Lab Units 09/04/19  1224   LACTIC ACID mmol/L 0 7     Results from last 7 days   Lab Units 09/04/19  0410   LIPASE u/L 118*     Results from last 7 days   Lab Units 09/04/19  1024   SODIUM UR  116   CREATININE UR mg/dL 14 1     9/4 CT abd/pelvis:  New right-sided moderate hydroureteronephrosis   No evidence of obstructing radiopaque calculus in the urinary tract to account for the finding  No significant change in the size of the partially calcified soft tissue mass in the root of the mesentery suspicious for neuroendocrine/carcinoid tumor  Minimal increase in the size of several small mesenteric and retroperitoneal lymph nodes   Attention on short-term follow-up  Multiple ill-defined hypodense lesions, some of which appear to be less conspicuous on this exam but not well evaluated without intravenous contrast   Moderately distended gallbladder containing sludge   No pericholecystic inflammation  Prostatomegaly  Curly [place] bladder is decompressed by a Haile catheter, however the degree of bladder wall thickening suggests superimposed chronic bladder outlet obstruction  9/4 RUQ US:  1  Gallbladder sludge without wall thickening or pericholecystic fluid  If clinical concern persists, consider follow-up HIDA scan  2  Multiple hepatic lesions consistent with known metastatic disease      HIDA scan pending    ED Treatment:   Medication Administration from 09/04/2019 0301 to 09/04/2019 7604       Date/Time Order Dose Route Action     09/04/2019 0358 sodium chloride 0 9 % bolus 1,000 mL 1,000 mL Intravenous New Bag     09/04/2019 0400 ondansetron (ZOFRAN) injection 4 mg 4 mg Intravenous Given     09/04/2019 0359 HYDROmorphone (DILAUDID) injection 0 5 mg 0 5 mg Intravenous Given     09/04/2019 0447 HYDROmorphone (DILAUDID) injection 1 mg 1 mg Intravenous Given     09/04/2019 0658 hydrALAZINE (APRESOLINE) injection 5 mg 5 mg Intravenous Given        Past Medical History:   Diagnosis Date    BPH (benign prostatic hyperplasia)     Cancer ([facility] [address] )     liver cancer    Cardiac disease     Coronary artery disease     Diabetes mellitus ([facility] [address] )     DJD (degenerative joint disease)     Gait disturbance     Generalized weakness     GERD (gastroesophageal reflux disease)     Gout     Hyperlipidemia     Hypertension     Renal disorder      Present on Admission:   Neuroendocrine tumor   Acute kidney injury superimposed on chronic kidney disease ([facility] [address] )   Accelerated hypertension   Anemia   Metabolic acidosis      Admitting Diagnosis: Vomiting [F78 77]  Metabolic acidosis [S54 1]  Hypertension [I10]  Pain of upper abdomen [R10 10]  Acute kidney injury superimposed on chronic kidney disease ([facility] [address] ) [N17 9, N18 9]  Age/Sex: [de-identified] y o  male  Admission Orders:    Current Facility-Administered Medications:  acetaminophen 650 mg Oral Q6H PRN   amLODIPine 10 mg Oral Daily   aspirin 81 mg Oral Daily   b complex-vitamin C-folic acid 1 capsule Oral Daily With Dinner   cholecalciferol 1,000 Units Oral Daily   colchicine 0 6 mg Oral Daily   heparin (porcine) 5,000 Units Subcutaneous Q8H [address]   HYDROmorphone 0 5 mg Intravenous Q3H PRN x4   insulin lispro 1-5 Units Subcutaneous TID AC   insulin lispro 1-5 Units Subcutaneous HS   methenamine hippurate 1 g Oral BID With Meals   ondansetron 4 mg Intravenous Q6H PRN x4   oxyCODONE-acetaminophen 1 tablet Oral Q6H PRN x1   pantoprazole 40 mg Oral Early Morning   sodium chloride 125 mL/hr Intravenous Continuous   tamsulosin 0 4 mg Oral Daily With Dinner       IP CONSULT TO NEPHROLOGY  IP CONSULT TO UROLOGY  IP CONSULT TO ACUTE CARE SURGERY   VS  SCDs  HIDA scan    Network Utilization Review Department  Phone: [phone]; Fax [phone]  [email or web address]  ATTENTION: Please call with any questions or concerns to [phone]  and carefully listen to the prompts so that you are directed to the right person  Send all requests for admission clinical reviews, approved or denied determinations and any other requests to fax [phone]   All voicemails are confidential

## 2019-09-05 NOTE — ASSESSMENT & PLAN NOTE
· With obstructive uropathy and chronic garcia   · Urology input appreciated   · CT shows suspected right hydronephrosis  Right upper quadrant ultrasound shows normal right kidney  Hydronephrosis is resolved    · Continue supportive measures at this time no urological intervention needed

## 2019-09-05 NOTE — UTILIZATION REVIEW
Notification of Inpatient Admission/Inpatient Authorization Request  This is a Notification of Inpatient Admission/Request for Inpatient Authorization for our facility [address]  Be advised that this patient was admitted to our facility under Inpatient Status  Please contact the Utilization Review Department where the patient is receiving care services for additional admission information  Place of Service Code: 24   Place of Service Name: Inpatient Hospital  Presentation Date & Time: 9/4/2019  3:03 AM  Inpatient Admission Date & Time: 9/4/19 9538  Discharge Date & Time: No discharge date for patient encounter  Discharge Disposition (if discharged): Home with [facility]  Attending Physician & NPI#: Srinivas Martinez, [address] [[id number] Physician:  MINISTERIO Kearney  [facility] ID- [id number]  [address] [place] [address]  Phone [phone]: [phone]  Fax: [phone]  Admission Orders (From admission, onward)     Ordered        09/04/19 0658  Inpatient Admission (expected length of stay for this patient Order details is greater than two midnights)  Once                   Facility: [address] [place] [address] Utilization Review Department  Phone: [phone]; Fax [phone]  [email or web address]  ATTENTION: Please call with any questions or concerns to [phone]  and carefully listen to the prompts so that you are directed to the right person  Send all requests for admission clinical reviews, approved or denied determinations and any other requests to fax [phone]   All voicemails are confidential

## 2019-09-05 NOTE — PROGRESS NOTES
Progress Note - Nephrology   Cornelia Terrell [de-identified] y o  male MRN: [id number]  Unit/Bed#: -02 Encounter: [id number]    A/P:  1  Acute renal failure due to acute tubular necrosis             Renal function stable, fraction excretion of sodium is greater than 2%, patient is in acute tubular necrosis  Continue supportive care, avoid nephrotoxic medications including IV contrast   2  Chronic kidney disease stage 3 with baseline creatinine likely around 1 4 - 1 5 mg/dL              As mentioned in prior notes, patient has not been able to achieve this prior baseline creatinine of 0 9 - 1 mg/dL for some time  3  Probable diabetic nephropathy  4  Proteinuria  5  Acidosis               patient's serum bicarbonate continues to improve  No diarrhea at this time  6  Neuroendocrine tumor               the patient is all hematology yesterday, Dr Laney Hankins, he is continue with Lanreotide injections every 4 weeks for now  7  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              Continue Haile catheter  8  Right hydroureter nephrosis              Unclear if this is due to retroperitoneal lymphadenopathy or some other obstructive process  Consider urology evaluation, we need to continue monitor the patient's kidney function closely  9  Abdominal pain with possible cholecystitis   Appreciate surgical input, patient for further evaluation later today  Intervention as indicated per surgical colleagues      10  Iron deficiency anemia               resolved      Follow up reason for today's visit:  Acute kidney injury/chronic kidney disease     Generalized abdominal pain    Patient Active Problem List   Diagnosis    Weakness generalized    Type 2 diabetes mellitus with complication, with long-term current use of insulin (HCC)    Essential hypertension    Mixed hyperlipidemia    Cardiac disease    Generalized abdominal mass    Hydroureter    Metabolic acidosis    Anemia    Syncope and collapse    Accelerated hypertension    Liver mass    Neuroendocrine tumor    Acute cystitis without hematuria    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)    Acute kidney injury superimposed on chronic kidney disease (HCC)    Acute pain of left knee    Pressure injury of right heel, unstageable (HCC)    Atherosclerosis of artery of extremity with ulceration (HCC)    Carcinoid syndrome (HCC)    Gram-negative bacteremia    Left hip pain    Acute cystitis with hematuria    Chronic indwelling Garcia catheter    Moderate protein-calorie malnutrition (HCC)    Biliary sludge    Urinary tract infection due to extended-spectrum beta lactamase (ESBL) producing Escherichia coli    Malignant neuroendocrine neoplasm (HCC)    Generalized abdominal pain         Subjective:   Patient continues to complain significant abdominal pain, when I evaluate him this morning he was attempting to eat breakfast     Objective:     Vitals: Blood pressure 140/87, pulse 91, temperature 97 5 °F (36 4 °C), temperature source Tympanic, resp  rate 18, height 5' 8" (1 727 m), weight 70 kg (154 lb 5 2 oz), SpO2 98 %  ,Body mass index is 23 46 kg/m²  Weight (last 2 days)     Date/Time   Weight    09/05/19 0600   70 (154 32)    09/04/19 0832   71 6 (157 85)    09/04/19 0810   71 (156 53)    09/04/19 0309   69 4 (153)                Intake/Output Summary (Last 24 hours) at 9/5/2019 0842  Last data filed at 9/5/2019 0617  Gross per 24 hour   Intake 1000 ml   Output 3400 ml   Net -2400 ml     I/O last 3 completed shifts: In: 2000 [I V :1000; IV Piggyback:1000]  Out: 4600 [Urine:4600]    Urethral Catheter Latex 16 Fr   (Active)   Reasons to continue Urinary Catheter  Chronic urinary catheter 9/4/2019  9:00 AM   Goal for Removal N/A- chronic garcia 9/4/2019  9:00 AM   Site Assessment Clean;Skin intact 9/4/2019  9:00 AM   Collection Container Standard drainage bag 9/4/2019  9:00 AM   Securement Method Securing device (Describe) 9/4/2019  9:00 AM   Output (mL) 900 mL 9/5/2019  6:17 AM       Physical Exam: /87 (BP Location: Left arm)   Pulse 91   Temp 97 5 °F (36 4 °C) (Tympanic)   Resp 18   Ht 5' 8" (1 727 m)   Wt 70 kg (154 lb 5 2 oz)   SpO2 98%   BMI 23 46 kg/m²     General Appearance:    Alert, cooperative, no distress, appears stated age   Head:    Normocephalic, without obvious abnormality, atraumatic   Eyes:    Conjunctiva/corneas clear   Ears:    Normal external ears   Nose:   Nares normal, septum midline, mucosa normal, no drainage    or sinus tenderness   Throat:   Lips, mucosa, and tongue normal; teeth and gums normal   Neck:   Supple   Back:     Symmetric, no curvature, ROM normal, no CVA tenderness   Lungs:     Clear to auscultation bilaterally, respirations unlabored   Chest wall:    No tenderness or deformity   Heart:    Regular rate and rhythm, S1 and S2 normal, no murmur, rub   or gallop   Abdomen:     Soft, diffuse tenderness specifically in the epigastric and right upper quadrant region   Extremities:   Extremities normal, atraumatic, no cyanosis or edema   Skin:   Skin color, texture, turgor normal, no rashes or lesions   Lymph nodes:   Cervical normal   Neurologic:   CNII-XII intact            Lab, Imaging and other studies: I have personally reviewed pertinent labs  CBC:   Lab Results   Component Value Date    WBC 23 50 (H) 09/05/2019    HGB 12 0 (L) 09/05/2019    HCT 35 9 (L) 09/05/2019    MCV 88 09/05/2019     09/05/2019    MCH 29 5 09/05/2019    MCHC 33 4 09/05/2019    RDW 15 4 (H) 09/05/2019    MPV 8 6 09/05/2019     CMP:   Lab Results   Component Value Date    K 3 7 09/05/2019     (H) 09/05/2019    CO2 18 (L) 09/05/2019    BUN 35 (H) 09/05/2019    CREATININE 2 04 (H) 09/05/2019    CALCIUM 9 3 09/05/2019    EGFR 30 09/05/2019           Results from last 7 days   Lab Units 09/05/19  0558 09/04/19  1706 09/04/19  0410 09/03/19  1104   POTASSIUM mmol/L 3 7 5 1 4 0 4 5   CHLORIDE mmol/L 109* 108* 110* 113*   CO2 mmol/L 18* 17* 12* 18*   BUN mg/dL 35* 37* 44* 47*   CREATININE mg/dL 2 04* 2 05* 2 36* 2 72*   CALCIUM mg/dL 9 3 9 3 8 6 9 5   ALK PHOS U/L  --   --  83 125*   ALT U/L  --   --  7 15   AST U/L  --   --  8* 8         Phosphorus: No results found for: PHOS  Magnesium: No results found for: MG  Urinalysis: No results found for: COLORU, CLARITYU, SPECGRAV, PHUR, LEUKOCYTESUR, NITRITE, PROTEINUA, GLUCOSEU, KETONESU, BILIRUBINUR, BLOODU  Ionized Calcium: No results found for: CAION  Coagulation: No results found for: PT, INR, APTT  Troponin: No results found for: TROPONINI  ABG: No results found for: PHART, QSZ7UWT, PO2ART, TNG5ZRD, H6SIKDVD, BEART, SOURCE  Radiology review:     IMAGING  Procedure: Ct Abdomen Pelvis Wo Contrast    Result Date: 9/4/2019  Narrative: CT ABDOMEN AND PELVIS WITHOUT IV CONTRAST INDICATION:   Abdominal distension Abdominal pain, unspecified  COMPARISON:  CT abdomen and pelvis on 6/4/2019  TECHNIQUE:  CT examination of the abdomen and pelvis was performed without intravenous contrast   Axial, sagittal, and coronal 2D reformatted images were created from the source data and submitted for interpretation  Radiation dose length product (DLP) for this visit:  377 9 mGy-cm   This examination, like all CT scans performed in the [facility], was performed utilizing techniques to minimize radiation dose exposure, including the use of iterative reconstruction and automated exposure control  Enteric contrast was not administered  FINDINGS: ABDOMEN LOWER CHEST:  Atelectatic changes are noted at the lung bases  Mildly prominent interstitial markings in the lower lobes  LIVER/BILIARY TREE:  Liver is diffusely heterogeneous in attenuation likely secondary to underlying lesions    Some of the lesions appear to be less conspicuous on this study although accurate assessment is hampered due to lack of intravenous contrast   A  discrete lesion in the left lobe measures 1 3 x 1 3 cm (series 2, image 20), previously measuring 1 7 x 1 8 cm  GALLBLADDER:  Moderately distended and filled with sludge  No pericholecystic inflammatory change  SPLEEN:  Unremarkable  PANCREAS:  Diffuse parenchymal atrophy, otherwise unremarkable,  ADRENAL GLANDS:  Unremarkable  KIDNEYS/URETERS:  There is moderate right hydroureteronephrosis which is new since the prior study  No radiopaque calculus in the urinary tract is identified to account for the finding  Left kidney is unremarkable  Bilateral renal vascular calcifications  STOMACH AND BOWEL:  No bowel obstruction  APPENDIX:  No findings to suggest appendicitis  ABDOMINOPELVIC CAVITY:  There is a partially calcified soft tissue mass in the root of the mesentery measuring approximately 4 5 x 3 1 x 4 7 cm in maximal axial dimensions  The mass is not significantly changed in size from the prior study when measured  in a similar fashion  The caudad aspect of the mass abuts the adjacent sigmoid colon which does not appear to be thickened  Minimal increase in the size of several mesenteric and retroperitoneal lymph nodes  For reference: -Mesenteric node in the left anterior abdomen measures 8mm in short axis (series 2, image 56), previously 5 mm  -Left para-aortic node measures 10 mm in short axis (series 2, image 48), previously 8 mm  No ascites or free intraperitoneal air  VESSELS:  Atherosclerotic changes are present  No evidence of aneurysm  PELVIS REPRODUCTIVE ORGANS:  The prostate is enlarged  URINARY BLADDER:  Decompressed by Haile catheter  There is diffuse bladder wall thickening which is more than expected for underdistention, potentially from bladder outlet obstruction  ABDOMINAL WALL/INGUINAL REGIONS:  There is a stable soft tissue nodule in the subcutaneous fat abutting the posterior margin of the right gluteal musculature measuring 9 x 8 mm  Attention on follow-up  OSSEOUS STRUCTURES:  No acute fracture or destructive osseous lesion  Degenerative changes throughout the lumbar spine  Impression: New right-sided moderate hydroureteronephrosis  No evidence of obstructing radiopaque calculus in the urinary tract to account for the finding  No significant change in the size of the partially calcified soft tissue mass in the root of the mesentery suspicious for neuroendocrine/carcinoid tumor  Minimal increase in the size of several small mesenteric and retroperitoneal lymph nodes  Attention on short-term follow-up  Multiple ill-defined hypodense lesions, some of which appear to be less conspicuous on this exam but not well evaluated without intravenous contrast  Moderately distended gallbladder containing sludge  No pericholecystic inflammation  Prostatomegaly  Urinary bladder is decompressed by a Haile catheter, however the degree of bladder wall thickening suggests superimposed chronic bladder outlet obstruction  Workstation performed: [id number]     Procedure: Us Right Upper Quadrant    Result Date: 9/4/2019  Narrative: RIGHT UPPER QUADRANT ULTRASOUND INDICATION:     rule out cholecystitis  COMPARISON:  CT from earlier in the day TECHNIQUE:   Real-time ultrasound of the right upper quadrant was performed with a curvilinear transducer with both volumetric sweeps and still imaging techniques  FINDINGS: PANCREAS:  Poorly visualized due to shadowing bowel gas  AORTA AND IVC:  Visualized portions are normal for patient age  LIVER: Size:  Mild cardiomegaly noted  The liver measures 17 4 cm in the midclavicular line  Contour:  Surface contour is smooth  Parenchyma:  Extensive hyperattenuating nodules  The largest is noted right hepatic lobe measuring 5 1 x 4 5 x 4 2 cm  Patient with known metastatic disease  No evidence of suspicious mass  Limited imaging of the main portal vein shows it to be patent and hepatopetal   BILIARY: The gallbladder is distended with sludge identified  No stones or pericholecystic fluid  No sonographic Siu's sign   No intrahepatic biliary dilatation  CBD measures 7 mm  No choledocholithiasis  KIDNEY: Right kidney measures 11 9 cm  Within normal limits  ASCITES:   None  Impression: 1  Gallbladder sludge without wall thickening or pericholecystic fluid  If clinical concern persists, consider follow-up HIDA scan  2  Multiple hepatic lesions consistent with known metastatic disease   Workstation performed: [id number]       Current Facility-Administered Medications   Medication Dose Route Frequency    acetaminophen (TYLENOL) tablet 650 mg  650 mg Oral Q6H PRN    amLODIPine (NORVASC) tablet 10 mg  10 mg Oral Daily    aspirin chewable tablet 81 mg  81 mg Oral Daily    b complex-vitamin C-folic acid (NEPHROCAPS) capsule 1 capsule  1 capsule Oral Daily With Dinner    cholecalciferol (VITAMIN D3) tablet 1,000 Units  1,000 Units Oral Daily    colchicine (COLCRYS) tablet 0 6 mg  0 6 mg Oral Daily    heparin (porcine) subcutaneous injection 5,000 Units  5,000 Units Subcutaneous Q8H [facility]    HYDROmorphone (DILAUDID) injection 0 5 mg  0 5 mg Intravenous Q3H PRN    insulin lispro (HumaLOG) 100 units/mL subcutaneous injection 1-5 Units  1-5 Units Subcutaneous TID AC    insulin lispro (HumaLOG) 100 units/mL subcutaneous injection 1-5 Units  1-5 Units Subcutaneous HS    methenamine hippurate (HIPREX) tablet 1 g  1 g Oral BID With Meals    ondansetron (ZOFRAN) injection 4 mg  4 mg Intravenous Q6H PRN    oxyCODONE-acetaminophen (PERCOCET) 5-325 mg per tablet 1 tablet  1 tablet Oral Q6H PRN    pantoprazole (PROTONIX) EC tablet 40 mg  40 mg Oral Early Morning    sodium chloride 0 9 % infusion  100 mL/hr Intravenous Continuous    tamsulosin (FLOMAX) capsule 0 4 mg  0 4 mg Oral Daily With Dinner     Medications Discontinued During This Encounter   Medication Reason    HYDROmorphone (DILAUDID) injection 0 5 mg     sodium chloride 0 9 % infusion     amLODIPine (NORVASC) 2 5 mg tablet        Kristan Howe DO

## 2019-09-05 NOTE — ASSESSMENT & PLAN NOTE
· Suspected due to cholecystitis with marked leukocytosis this AM   · abx started   · US RUQ shows sludge, HIDA scan is pending   · Will continue pain control as needed  · CT scan of the abdomen/pelvis results reviewed

## 2019-09-05 NOTE — PLAN OF CARE
Problem: Prexisting or High Potential for Compromised Skin Integrity  Goal: Skin integrity is maintained or improved  Description  INTERVENTIONS:  - Identify patients at risk for skin breakdown  - Assess and monitor skin integrity  - Assess and monitor nutrition and hydration status  - Monitor labs   - Assess for incontinence   - Turn and reposition patient  - Assist with mobility/ambulation  - Relieve pressure over bony prominences  - Avoid friction and shearing  - Provide appropriate hygiene as needed including keeping skin clean and dry  - Evaluate need for skin moisturizer/barrier cream  - Collaborate with interdisciplinary team   - Patient/family teaching  - Consider wound care consult   Outcome: Progressing     Problem: Potential for Falls  Goal: Patient will remain free of falls  Description  INTERVENTIONS:  - Assess patient frequently for physical needs  -  Identify cognitive and physical deficits and behaviors that affect risk of falls    -  [place] fall precautions as indicated by assessment   - Educate patient/family on patient safety including physical limitations  - Instruct patient to call for assistance with activity based on assessment  - Modify environment to reduce risk of injury  - Consider OT/PT consult to assist with strengthening/mobility  Outcome: Progressing     Problem: GASTROINTESTINAL - ADULT  Goal: Minimal or absence of nausea and/or vomiting  Description  INTERVENTIONS:  - Administer IV fluids if ordered to ensure adequate hydration  - Maintain NPO status until nausea and vomiting are resolved  - Nasogastric tube if ordered  - Administer ordered antiemetic medications as needed  - Provide nonpharmacologic comfort measures as appropriate  - Advance diet as tolerated, if ordered  - Consider nutrition services referral to assist patient with adequate nutrition and appropriate food choices  Outcome: Progressing  Goal: Maintains or returns to baseline bowel function  Description  INTERVENTIONS:  - Assess bowel function  - Encourage oral fluids to ensure adequate hydration  - Administer IV fluids if ordered to ensure adequate hydration  - Administer ordered medications as needed  - Encourage mobilization and activity  - Consider nutritional services referral to assist patient with adequate nutrition and appropriate food choices  Outcome: Progressing  Goal: Maintains adequate nutritional intake  Description  INTERVENTIONS:  - Monitor percentage of each meal consumed  - Identify factors contributing to decreased intake, treat as appropriate  - Assist with meals as needed  - Monitor I&O, weight, and lab values if indicated  - Obtain nutrition services referral as needed  Outcome: Progressing  Goal: Establish and maintain optimal ostomy function  Description  INTERVENTIONS:  - Assess bowel function  - Encourage oral fluids to ensure adequate hydration  - Administer IV fluids if ordered to ensure adequate hydration   - Administer ordered medications as needed  - Encourage mobilization and activity  - Nutrition services referral to assist patient with appropriate food choices  - Assess stoma site  - Consider wound care consult   Outcome: Progressing     Problem: GENITOURINARY - ADULT  Goal: Maintains or returns to baseline urinary function  Description  INTERVENTIONS:  - Assess urinary function  - Encourage oral fluids to ensure adequate hydration if ordered  - Administer IV fluids as ordered to ensure adequate hydration  - Administer ordered medications as needed  - Offer frequent toileting  - Follow urinary retention protocol if ordered  Outcome: Progressing  Goal: Absence of urinary retention  Description  INTERVENTIONS:  - Assess patients ability to void and empty bladder  - Monitor I/O  - Bladder scan as needed  - Discuss with physician/AP medications to alleviate retention as needed  - Discuss catheterization for long term situations as appropriate  Outcome: Progressing  Goal: Urinary catheter remains patent  Description  INTERVENTIONS:  - Assess patency of urinary catheter  - If patient has a chronic garcia, consider changing catheter if non-functioning  - Follow guidelines for intermittent irrigation of non-functioning urinary catheter  Outcome: Progressing     Problem: METABOLIC, FLUID AND ELECTROLYTES - ADULT  Goal: Electrolytes maintained within normal limits  Description  INTERVENTIONS:  - Monitor labs and assess patient for signs and symptoms of electrolyte imbalances  - Administer electrolyte replacement as ordered  - Monitor response to electrolyte replacements, including repeat lab results as appropriate  - Instruct patient on fluid and nutrition as appropriate  Outcome: Progressing  Goal: Fluid balance maintained  Description  INTERVENTIONS:  - Monitor labs   - Monitor I/O and WT  - Instruct patient on fluid and nutrition as appropriate  - Assess for signs & symptoms of volume excess or deficit  Outcome: Progressing  Goal: Glucose maintained within target range  Description  INTERVENTIONS:  - Monitor Blood Glucose as ordered  - Assess for signs and symptoms of hyperglycemia and hypoglycemia  - Administer ordered medications to maintain glucose within target range  - Assess nutritional intake and initiate nutrition service referral as needed  Outcome: Progressing     Problem: SKIN/TISSUE INTEGRITY - ADULT  Goal: Skin integrity remains intact  Description  INTERVENTIONS  - Identify patients at risk for skin breakdown  - Assess and monitor skin integrity  - Assess and monitor nutrition and hydration status  - Monitor labs (i e  albumin)  - Assess for incontinence   - Turn and reposition patient  - Assist with mobility/ambulation  - Relieve pressure over bony prominences  - Avoid friction and shearing  - Provide appropriate hygiene as needed including keeping skin clean and dry  - Evaluate need for skin moisturizer/barrier cream  - Collaborate with interdisciplinary team (i e  Nutrition, Rehabilitation, etc )   - Patient/family teaching  Outcome: Progressing  Goal: Incision(s), wounds(s) or drain site(s) healing without S/S of infection  Description  INTERVENTIONS  - Assess and document risk factors for skin impairment   - Assess and document dressing, incision, wound bed, drain sites and surrounding tissue  - Consider nutrition services referral as needed  - Oral mucous membranes remain intact  - Provide patient/ family education  Outcome: Progressing  Goal: Oral mucous membranes remain intact  Description  INTERVENTIONS  - Assess oral mucosa and hygiene practices  - Implement preventative oral hygiene regimen  - Implement oral medicated treatments as ordered  - Initiate Nutrition services referral as needed  Outcome: Progressing     Problem: PAIN - ADULT  Goal: Verbalizes/displays adequate comfort level or baseline comfort level  Description  Interventions:  - Encourage patient to monitor pain and request assistance  - Assess pain using appropriate pain scale  - Administer analgesics based on type and severity of pain and evaluate response  - Implement non-pharmacological measures as appropriate and evaluate response  - Consider cultural and social influences on pain and pain management  - Notify physician/advanced practitioner if interventions unsuccessful or patient reports new pain  Outcome: Progressing     Problem: INFECTION - ADULT  Goal: Absence or prevention of progression during hospitalization  Description  INTERVENTIONS:  - Assess and monitor for signs and symptoms of infection  - Monitor lab/diagnostic results  - Monitor all insertion sites, i e  indwelling lines, tubes, and drains  - Monitor endotracheal if appropriate and nasal secretions for changes in amount and color  - [place] appropriate cooling/warming therapies per order  - Administer medications as ordered  - Instruct and encourage patient and family to use good hand hygiene technique  - Identify and instruct in appropriate isolation precautions for identified infection/condition  Outcome: Progressing  Goal: Absence of fever/infection during neutropenic period  Description  INTERVENTIONS:  - Monitor WBC    Outcome: Progressing     Problem: SAFETY ADULT  Goal: Maintain or return to baseline ADL function  Description  INTERVENTIONS:  -  Assess patient's ability to carry out ADLs; assess patient's baseline for ADL function and identify physical deficits which impact ability to perform ADLs (bathing, care of mouth/teeth, toileting, grooming, dressing, etc )  - Assess/evaluate cause of self-care deficits   - Assess range of motion  - Assess patient's mobility; develop plan if impaired  - Assess patient's need for assistive devices and provide as appropriate  - Encourage maximum independence but intervene and supervise when necessary  - Involve family in performance of ADLs  - Assess for home care needs following discharge   - Consider OT consult to assist with ADL evaluation and planning for discharge  - Provide patient education as appropriate  Outcome: Progressing  Goal: Maintain or return mobility status to optimal level  Description  INTERVENTIONS:  - Assess patient's baseline mobility status (ambulation, transfers, stairs, etc )    - Identify cognitive and physical deficits and behaviors that affect mobility  - Identify mobility aids required to assist with transfers and/or ambulation (gait belt, sit-to-stand, lift, walker, cane, etc )  - [place] fall precautions as indicated by assessment  - Record patient progress and toleration of activity level on Mobility SBAR; progress patient to next Phase/Stage  - Instruct patient to call for assistance with activity based on assessment  - Consider rehabilitation consult to assist with strengthening/weightbearing, etc   Outcome: Progressing     Problem: DISCHARGE PLANNING  Goal: Discharge to home or other facility with appropriate resources  Description  INTERVENTIONS:  - Identify barriers to discharge w/patient and caregiver  - Arrange for needed discharge resources and transportation as appropriate  - Identify discharge learning needs (meds, wound care, etc )  - Arrange for interpretive services to assist at discharge as needed  - Refer to Case Management Department for coordinating discharge planning if the patient needs post-hospital services based on physician/advanced practitioner order or complex needs related to functional status, cognitive ability, or social support system  Outcome: Progressing     Problem: Knowledge Deficit  Goal: Patient/family/caregiver demonstrates understanding of disease process, treatment plan, medications, and discharge instructions  Description  Complete learning assessment and assess knowledge base    Interventions:  - Provide teaching at level of understanding  - Provide teaching via preferred learning methods  Outcome: Progressing

## 2019-09-05 NOTE — PLAN OF CARE
Problem: DISCHARGE PLANNING - CARE MANAGEMENT  Goal: Discharge to post-acute care or home with appropriate resources  Description  INTERVENTIONS:  - Conduct assessment to determine patient/family and health care team treatment goals, and need for post-acute services based on payer coverage, community resources, and patient preferences, and barriers to discharge  - Address psychosocial, clinical, and financial barriers to discharge as identified in assessment in conjunction with the patient/family and health care team  - Arrange appropriate level of post-acute services according to patient's   needs and preference and payer coverage in collaboration with the physician and health care team  - Communicate with and update the patient/family, physician, and health care team regarding progress on the discharge plan  - Arrange appropriate transportation to post-acute venues    Need to reassess d/c plan   Outcome: Progressing

## 2019-09-05 NOTE — ASSESSMENT & PLAN NOTE
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

## 2019-09-05 NOTE — PROGRESS NOTES
Progress Note - Rah Mchugh [de-identified] y o  male MRN: [id number]    Unit/Bed#: -02 Encounter: [id number]      Assessment:  Right upper quadrant ultrasound shows normal right kidney  Right hydronephrosis has resolved  Creatinine has decreased to 2 04  Plan:  Continue Haile catheter  No urologic intervention needed at this time  Await HIDA scan  Subjective:   Feels better this morning with no significant pain  Tolerating Haile catheter well  Objective:     Vitals: Blood pressure (!) 175/80, pulse 98, temperature 99 7 °F (37 6 °C), temperature source Tympanic, resp  rate 18, height 5' 8" (1 727 m), weight 70 kg (154 lb 5 2 oz), SpO2 96 %  ,Body mass index is 23 46 kg/m²  Intake/Output Summary (Last 24 hours) at 9/5/2019 1533  Last data filed at 9/5/2019 1444  Gross per 24 hour   Intake 2100 ml   Output 3100 ml   Net -1000 ml       Physical Exam: Male genitalia: normal   Haile draining clear urine  Invasive Devices     Peripheral Intravenous Line            Peripheral IV 09/04/19 Right Arm 1 day          Drain            Urethral Catheter Latex 16 Fr  68 days                Lab, Imaging and other studies: I have personally reviewed pertinent reports      VTE Pharmacologic Prophylaxis: Sequential compression device (Venodyne)   VTE Mechanical Prophylaxis: sequential compression device

## 2019-09-06 ENCOUNTER — HOSPITAL ENCOUNTER (OUTPATIENT)
Dept: INFUSION CENTER | Facility: HOSPITAL | Age: 79
Discharge: HOME | End: 2019-09-06

## 2019-09-06 ENCOUNTER — APPOINTMENT (INPATIENT)
Dept: RADIOLOGY | Facility: HOSPITAL | Age: 79
DRG: 444 | End: 2019-09-06
Attending: SPECIALIST
Payer: COMMERCIAL

## 2019-09-06 ENCOUNTER — APPOINTMENT (INPATIENT)
Dept: NUCLEAR MEDICINE | Facility: HOSPITAL | Age: 79
DRG: 444 | End: 2019-09-06
Payer: COMMERCIAL

## 2019-09-06 LAB
ALBUMIN SERPL BCP-MCNC: 3 G/DL (ref 3.5–5.7)
ALP SERPL-CCNC: 82 U/L (ref 55–165)
ALT SERPL W P-5'-P-CCNC: 13 U/L (ref 7–52)
ANION GAP SERPL CALCULATED.3IONS-SCNC: 11 MMOL/L (ref 4–13)
AST SERPL W P-5'-P-CCNC: 11 U/L (ref 13–39)
BASOPHILS # BLD AUTO: 0.1 THOUSANDS/ΜL (ref 0–0.1)
BASOPHILS NFR BLD AUTO: 1 % (ref 0–2)
BILIRUB SERPL-MCNC: 1 MG/DL (ref 0.2–1)
BUN SERPL-MCNC: 32 MG/DL (ref 7–25)
CALCIUM SERPL-MCNC: 8.7 MG/DL (ref 8.6–10.5)
CHLORIDE SERPL-SCNC: 111 MMOL/L (ref 98–107)
CO2 SERPL-SCNC: 17 MMOL/L (ref 21–31)
CREAT SERPL-MCNC: 2.22 MG/DL (ref 0.7–1.3)
EOSINOPHIL # BLD AUTO: 0.1 THOUSAND/ΜL (ref 0–0.61)
EOSINOPHIL NFR BLD AUTO: 1 % (ref 0–5)
ERYTHROCYTE [DISTWIDTH] IN BLOOD BY AUTOMATED COUNT: 15.1 % (ref 11.5–14.5)
GFR SERPL CREATININE-BSD FRML MDRD: 27 ML/MIN/1.73SQ M
GLUCOSE SERPL-MCNC: 228 MG/DL (ref 65–99)
GLUCOSE SERPL-MCNC: 233 MG/DL (ref 65–140)
GLUCOSE SERPL-MCNC: 252 MG/DL (ref 65–140)
GLUCOSE SERPL-MCNC: 255 MG/DL (ref 65–140)
GLUCOSE SERPL-MCNC: 279 MG/DL (ref 65–140)
HCT VFR BLD AUTO: 31.1 % (ref 42–47)
HGB BLD-MCNC: 10.6 G/DL (ref 14–18)
LYMPHOCYTES # BLD AUTO: 0.9 THOUSANDS/ΜL (ref 0.6–4.47)
LYMPHOCYTES NFR BLD AUTO: 5 % (ref 21–51)
MCH RBC QN AUTO: 29.8 PG (ref 26–34)
MCHC RBC AUTO-ENTMCNC: 34.1 G/DL (ref 31–37)
MCV RBC AUTO: 87 FL (ref 81–99)
MONOCYTES # BLD AUTO: 1.5 THOUSAND/ΜL (ref 0.17–1.22)
MONOCYTES NFR BLD AUTO: 9 % (ref 2–12)
NEUTROPHILS # BLD AUTO: 14.9 THOUSANDS/ΜL (ref 1.4–6.5)
NEUTS SEG NFR BLD AUTO: 85 % (ref 42–75)
PLATELET # BLD AUTO: 201 THOUSANDS/UL (ref 149–390)
PMV BLD AUTO: 9 FL (ref 8.6–11.7)
POTASSIUM SERPL-SCNC: 3.5 MMOL/L (ref 3.5–5.5)
PROCALCITONIN SERPL-MCNC: 1.2 NG/ML
PROT SERPL-MCNC: 6.1 G/DL (ref 6.4–8.9)
RBC # BLD AUTO: 3.56 MILLION/UL (ref 4.3–5.9)
SODIUM SERPL-SCNC: 139 MMOL/L (ref 134–143)
WBC # BLD AUTO: 17.5 THOUSAND/UL (ref 4.8–10.8)

## 2019-09-06 PROCEDURE — C1769 GUIDE WIRE: HCPCS

## 2019-09-06 PROCEDURE — 47490 INCISION OF GALLBLADDER: CPT

## 2019-09-06 PROCEDURE — 84145 PROCALCITONIN (PCT): CPT | Performed by: NURSE PRACTITIONER

## 2019-09-06 PROCEDURE — 99152 MOD SED SAME PHYS/QHP 5/>YRS: CPT | Performed by: RADIOLOGY

## 2019-09-06 PROCEDURE — 82948 REAGENT STRIP/BLOOD GLUCOSE: CPT

## 2019-09-06 PROCEDURE — 87205 SMEAR GRAM STAIN: CPT | Performed by: RADIOLOGY

## 2019-09-06 PROCEDURE — 78226 HEPATOBILIARY SYSTEM IMAGING: CPT

## 2019-09-06 PROCEDURE — A9537 TC99M MEBROFENIN: HCPCS

## 2019-09-06 PROCEDURE — 99232 SBSQ HOSP IP/OBS MODERATE 35: CPT | Performed by: NURSE PRACTITIONER

## 2019-09-06 PROCEDURE — 85025 COMPLETE CBC W/AUTO DIFF WBC: CPT | Performed by: NURSE PRACTITIONER

## 2019-09-06 PROCEDURE — 80053 COMPREHEN METABOLIC PANEL: CPT | Performed by: NURSE PRACTITIONER

## 2019-09-06 PROCEDURE — 87070 CULTURE OTHR SPECIMN AEROBIC: CPT | Performed by: RADIOLOGY

## 2019-09-06 PROCEDURE — C1729 CATH, DRAINAGE: HCPCS

## 2019-09-06 PROCEDURE — 99232 SBSQ HOSP IP/OBS MODERATE 35: CPT | Performed by: INTERNAL MEDICINE

## 2019-09-06 PROCEDURE — C1894 INTRO/SHEATH, NON-LASER: HCPCS

## 2019-09-06 PROCEDURE — 47490 INCISION OF GALLBLADDER: CPT | Performed by: RADIOLOGY

## 2019-09-06 PROCEDURE — 0F9430Z DRAINAGE OF GALLBLADDER WITH DRAINAGE DEVICE, PERCUTANEOUS APPROACH: ICD-10-PCS | Performed by: RADIOLOGY

## 2019-09-06 RX ORDER — FENTANYL CITRATE 50 UG/ML
INJECTION, SOLUTION INTRAMUSCULAR; INTRAVENOUS CODE/TRAUMA/SEDATION MEDICATION
Status: COMPLETED | OUTPATIENT
Start: 2019-09-06 | End: 2019-09-06

## 2019-09-06 RX ORDER — LIDOCAINE HYDROCHLORIDE 10 MG/ML
INJECTION, SOLUTION INFILTRATION; PERINEURAL CODE/TRAUMA/SEDATION MEDICATION
Status: COMPLETED | OUTPATIENT
Start: 2019-09-06 | End: 2019-09-06

## 2019-09-06 RX ORDER — MIDAZOLAM HYDROCHLORIDE 1 MG/ML
INJECTION INTRAMUSCULAR; INTRAVENOUS CODE/TRAUMA/SEDATION MEDICATION
Status: COMPLETED | OUTPATIENT
Start: 2019-09-06 | End: 2019-09-06

## 2019-09-06 RX ADMIN — METHENAMINE HIPPURATE 1 G: 1 TABLET ORAL at 17:56

## 2019-09-06 RX ADMIN — FENTANYL CITRATE 50 MCG: 50 INJECTION INTRAMUSCULAR; INTRAVENOUS at 16:06

## 2019-09-06 RX ADMIN — TAMSULOSIN HYDROCHLORIDE 0.4 MG: 0.4 CAPSULE ORAL at 17:56

## 2019-09-06 RX ADMIN — ONDANSETRON 4 MG: 2 INJECTION INTRAMUSCULAR; INTRAVENOUS at 17:55

## 2019-09-06 RX ADMIN — FENTANYL CITRATE 25 MCG: 50 INJECTION INTRAMUSCULAR; INTRAVENOUS at 16:00

## 2019-09-06 RX ADMIN — PIPERACILLIN SODIUM AND TAZOBACTAM SODIUM 3.38 G: 3; .375 INJECTION, POWDER, LYOPHILIZED, FOR SOLUTION INTRAVENOUS at 17:56

## 2019-09-06 RX ADMIN — HYDROMORPHONE HYDROCHLORIDE 0.5 MG: 1 INJECTION, SOLUTION INTRAMUSCULAR; INTRAVENOUS; SUBCUTANEOUS at 11:26

## 2019-09-06 RX ADMIN — OXYCODONE HYDROCHLORIDE AND ACETAMINOPHEN 1 TABLET: 5; 325 TABLET ORAL at 23:02

## 2019-09-06 RX ADMIN — HYDROMORPHONE HYDROCHLORIDE 0.5 MG: 1 INJECTION, SOLUTION INTRAMUSCULAR; INTRAVENOUS; SUBCUTANEOUS at 19:50

## 2019-09-06 RX ADMIN — PIPERACILLIN SODIUM AND TAZOBACTAM SODIUM 3.38 G: 3; .375 INJECTION, POWDER, LYOPHILIZED, FOR SOLUTION INTRAVENOUS at 04:46

## 2019-09-06 RX ADMIN — MIDAZOLAM HYDROCHLORIDE 0.5 MG: 1 INJECTION, SOLUTION INTRAMUSCULAR; INTRAVENOUS at 16:05

## 2019-09-06 RX ADMIN — PIPERACILLIN SODIUM AND TAZOBACTAM SODIUM 3.38 G: 3; .375 INJECTION, POWDER, LYOPHILIZED, FOR SOLUTION INTRAVENOUS at 21:48

## 2019-09-06 RX ADMIN — FENTANYL CITRATE 25 MCG: 50 INJECTION INTRAMUSCULAR; INTRAVENOUS at 16:04

## 2019-09-06 RX ADMIN — HYDROMORPHONE HYDROCHLORIDE 0.5 MG: 1 INJECTION, SOLUTION INTRAMUSCULAR; INTRAVENOUS; SUBCUTANEOUS at 15:02

## 2019-09-06 RX ADMIN — MIDAZOLAM HYDROCHLORIDE 0.5 MG: 1 INJECTION, SOLUTION INTRAMUSCULAR; INTRAVENOUS at 16:00

## 2019-09-06 RX ADMIN — LIDOCAINE HYDROCHLORIDE 1 ML: 10 INJECTION, SOLUTION INFILTRATION; PERINEURAL at 16:01

## 2019-09-06 RX ADMIN — PIPERACILLIN SODIUM AND TAZOBACTAM SODIUM 3.38 G: 3; .375 INJECTION, POWDER, LYOPHILIZED, FOR SOLUTION INTRAVENOUS at 12:07

## 2019-09-06 RX ADMIN — SODIUM CHLORIDE 100 ML/HR: 9 INJECTION, SOLUTION INTRAVENOUS at 05:51

## 2019-09-06 RX ADMIN — SODIUM CHLORIDE 100 ML/HR: 9 INJECTION, SOLUTION INTRAVENOUS at 17:55

## 2019-09-06 RX ADMIN — Medication 1 CAPSULE: at 17:55

## 2019-09-06 NOTE — SOCIAL WORK
Pt discussed during care coordination rounds  Pt not yet stable for discharge  Pt will need another 24-48 hours inpatient treatment  Discharge plan remains for pt to return home with [facility] and [facility]  Referral was sent for [facility] and they have indicated they can resume once cleared  CM to advise of medical clearance  SO to transport  CM to follow

## 2019-09-06 NOTE — PROGRESS NOTES
Progress Note - Nephrology   Clemente Ramirez [de-identified] y o  male MRN: [id number]  Unit/Bed#: -02 Encounter: [id number]    A/P:  1  Acute renal failure due to acute tubular necrosis             Creatinine slightly increased this morning, this may possibly be due to some volume depletion due to the reduced oral intake the patient has been experiencing  Will order for normal saline to infuse at about 75 mL/hour for now  2  Chronic kidney disease stage 3 with baseline creatinine likely around 1 4 - 1 5 mg/dL              As mentioned in prior notes, patient has not been able to achieve this prior baseline creatinine of 0 9 - 1 mg/dL for some time  ===========  3  Probable diabetic nephropathy  4  Proteinuria  5  Acidosis                serum bicarbonate remains stable/borderline low     6  Neuroendocrine tumor               Continue follow-up with Dr Jeff Leroy is continue with Lanreotide injections every 4 weeks for now   =============  7  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              Continue Haile catheter  8  Right hydroureter nephrosis              Continue her recommendations from Urology  9  Abdominal pain with possible cholecystitis               patient for HIDA scan completion later this morning, further care as indicated by surgery      10  Iron deficiency anemia               resolved      Follow up reason for today's visit: Acute kidney injury/chronic kidney disease     Generalized abdominal pain    Patient Active Problem List   Diagnosis    Weakness generalized    Type 2 diabetes mellitus with complication, with long-term current use of insulin ([address] )    Essential hypertension    Mixed hyperlipidemia    Cardiac disease    Generalized abdominal mass    Hydroureter    Metabolic acidosis    Iron deficiency anemia secondary to inadequate dietary iron intake    Syncope and collapse    Accelerated hypertension    Liver mass    Neuroendocrine tumor    Acute cystitis without hematuria    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)    Acute kidney injury superimposed on chronic kidney disease (HCC)    Acute pain of left knee    Pressure injury of right heel, unstageable (HCC)    Atherosclerosis of artery of extremity with ulceration (HCC)    Carcinoid syndrome (HCC)    Gram-negative bacteremia    Left hip pain    Acute cystitis with hematuria    Chronic indwelling Garcia catheter    Moderate protein-calorie malnutrition (HCC)    Biliary sludge    Urinary tract infection due to extended-spectrum beta lactamase (ESBL) producing Escherichia coli    Malignant neuroendocrine neoplasm (HCC)    Generalized abdominal pain    Urinary retention         Subjective:   Patient continues to have abdominal pain, it is and continues to be associated with food ingestion  Patient is came back up from the 1st part of the HIDA scan    Objective:     Vitals: Blood pressure 168/81, pulse 86, temperature 98 4 °F (36 9 °C), temperature source Tympanic, resp  rate 18, height 5' 8" (1 727 m), weight 70 kg (154 lb 5 2 oz), SpO2 96 %  ,Body mass index is 23 46 kg/m²  Weight (last 2 days)     Date/Time   Weight    09/05/19 0600   70 (154 32)    09/04/19 0832   71 6 (157 85)    09/04/19 0810   71 (156 53)    09/04/19 0309   69 4 (153)                Intake/Output Summary (Last 24 hours) at 9/6/2019 0932  Last data filed at 9/5/2019 1444  Gross per 24 hour   Intake 1860 ml   Output 1000 ml   Net 860 ml     I/O last 3 completed shifts: In: 2100 [P O :2100]  Out: 3100 [Urine:3100]    Urethral Catheter Latex 16 Fr   (Active)   Reasons to continue Urinary Catheter  Chronic urinary catheter 9/4/2019  9:00 AM   Goal for Removal N/A- chronic garcia 9/4/2019  9:00 AM   Site Assessment Clean;Skin intact 9/4/2019  9:00 AM   Collection Container Standard drainage bag 9/4/2019  9:00 AM   Securement Method Securing device (Describe) 9/4/2019  9:00 AM   Output (mL) 900 mL 9/5/2019  6:17 AM Physical Exam: /81 (BP Location: Right arm)   Pulse 86   Temp 98 4 °F (36 9 °C) (Tympanic)   Resp 18   Ht 5' 8" (1 727 m)   Wt 70 kg (154 lb 5 2 oz)   SpO2 96%   BMI 23 46 kg/m²     General Appearance:    Alert, cooperative, no distress, appears stated age   Head:    Normocephalic, without obvious abnormality, atraumatic   Eyes:    Conjunctiva/corneas clear   Ears:    Normal external ears   Nose:   Nares normal, septum midline, mucosa normal, no drainage    or sinus tenderness   Throat:   Lips, mucosa, and tongue normal; teeth and gums normal   Neck:   Supple   Back:     Symmetric, no curvature, ROM normal, no CVA tenderness   Lungs:     Clear to auscultation bilaterally, respirations unlabored   Chest wall:    No tenderness or deformity   Heart:    Regular rate and rhythm, S1 and S2 normal, no murmur, rub   or gallop   Abdomen:     Soft, non-tender, bowel sounds active   Extremities:   Extremities normal, atraumatic, no cyanosis or edema   Skin:   Skin color, texture, turgor normal, no rashes or lesions   Lymph nodes:   Cervical normal   Neurologic:   CNII-XII intact            Lab, Imaging and other studies: I have personally reviewed pertinent labs  CBC:   Lab Results   Component Value Date    WBC 17 50 (H) 09/06/2019    HGB 10 6 (L) 09/06/2019    HCT 31 1 (L) 09/06/2019    MCV 87 09/06/2019     09/06/2019    MCH 29 8 09/06/2019    MCHC 34 1 09/06/2019    RDW 15 1 (H) 09/06/2019    MPV 9 0 09/06/2019     CMP:   Lab Results   Component Value Date    K 3 5 09/06/2019     (H) 09/06/2019    CO2 17 (L) 09/06/2019    BUN 32 (H) 09/06/2019    CREATININE 2 22 (H) 09/06/2019    CALCIUM 8 7 09/06/2019    AST 11 (L) 09/06/2019    ALT 13 09/06/2019    ALKPHOS 82 09/06/2019    EGFR 27 09/06/2019           Results from last 7 days   Lab Units 09/06/19  0610 09/05/19  0558 09/04/19  1706 09/04/19  0410 09/03/19  1104   POTASSIUM mmol/L 3 5 3 7 5 1 4 0 4 5   CHLORIDE mmol/L 111* 109* 108* 110* 113*   CO2 mmol/L 17* 18* 17* 12* 18*   BUN mg/dL 32* 35* 37* 44* 47*   CREATININE mg/dL 2 22* 2 04* 2 05* 2 36* 2 72*   CALCIUM mg/dL 8 7 9 3 9 3 8 6 9 5   ALK PHOS U/L 82  --   --  83 125*   ALT U/L 13  --   --  7 15   AST U/L 11*  --   --  8* 8         Phosphorus: No results found for: PHOS  Magnesium: No results found for: MG  Urinalysis: No results found for: COLORU, CLARITYU, SPECGRAV, PHUR, LEUKOCYTESUR, NITRITE, PROTEINUA, GLUCOSEU, KETONESU, BILIRUBINUR, BLOODU  Ionized Calcium: No results found for: CAION  Coagulation: No results found for: PT, INR, APTT  Troponin: No results found for: TROPONINI  ABG: No results found for: PHART, TBC6GKU, PO2ART, NMF6IZH, J0JQHKLU, BEART, SOURCE  Radiology review:     IMAGING  Procedure: Ct Abdomen Pelvis Wo Contrast    Result Date: 9/4/2019  Narrative: CT ABDOMEN AND PELVIS WITHOUT IV CONTRAST INDICATION:   Abdominal distension Abdominal pain, unspecified  COMPARISON:  CT abdomen and pelvis on 6/4/2019  TECHNIQUE:  CT examination of the abdomen and pelvis was performed without intravenous contrast   Axial, sagittal, and coronal 2D reformatted images were created from the source data and submitted for interpretation  Radiation dose length product (DLP) for this visit:  377 9 mGy-cm   This examination, like all CT scans performed in the [facility], was performed utilizing techniques to minimize radiation dose exposure, including the use of iterative reconstruction and automated exposure control  Enteric contrast was not administered  FINDINGS: ABDOMEN LOWER CHEST:  Atelectatic changes are noted at the lung bases  Mildly prominent interstitial markings in the lower lobes  LIVER/BILIARY TREE:  Liver is diffusely heterogeneous in attenuation likely secondary to underlying lesions    Some of the lesions appear to be less conspicuous on this study although accurate assessment is hampered due to lack of intravenous contrast   A  discrete lesion in the left lobe measures 1 3 x 1 3 cm (series 2, image 20), previously measuring 1 7 x 1 8 cm  GALLBLADDER:  Moderately distended and filled with sludge  No pericholecystic inflammatory change  SPLEEN:  Unremarkable  PANCREAS:  Diffuse parenchymal atrophy, otherwise unremarkable,  ADRENAL GLANDS:  Unremarkable  KIDNEYS/URETERS:  There is moderate right hydroureteronephrosis which is new since the prior study  No radiopaque calculus in the urinary tract is identified to account for the finding  Left kidney is unremarkable  Bilateral renal vascular calcifications  STOMACH AND BOWEL:  No bowel obstruction  APPENDIX:  No findings to suggest appendicitis  ABDOMINOPELVIC CAVITY:  There is a partially calcified soft tissue mass in the root of the mesentery measuring approximately 4 5 x 3 1 x 4 7 cm in maximal axial dimensions  The mass is not significantly changed in size from the prior study when measured  in a similar fashion  The caudad aspect of the mass abuts the adjacent sigmoid colon which does not appear to be thickened  Minimal increase in the size of several mesenteric and retroperitoneal lymph nodes  For reference: -Mesenteric node in the left anterior abdomen measures 8mm in short axis (series 2, image 56), previously 5 mm  -Left para-aortic node measures 10 mm in short axis (series 2, image 48), previously 8 mm  No ascites or free intraperitoneal air  VESSELS:  Atherosclerotic changes are present  No evidence of aneurysm  PELVIS REPRODUCTIVE ORGANS:  The prostate is enlarged  URINARY BLADDER:  Decompressed by Haile catheter  There is diffuse bladder wall thickening which is more than expected for underdistention, potentially from bladder outlet obstruction  ABDOMINAL WALL/INGUINAL REGIONS:  There is a stable soft tissue nodule in the subcutaneous fat abutting the posterior margin of the right gluteal musculature measuring 9 x 8 mm  Attention on follow-up   OSSEOUS STRUCTURES:  No acute fracture or destructive osseous lesion  Degenerative changes throughout the lumbar spine  Impression: New right-sided moderate hydroureteronephrosis  No evidence of obstructing radiopaque calculus in the urinary tract to account for the finding  No significant change in the size of the partially calcified soft tissue mass in the root of the mesentery suspicious for neuroendocrine/carcinoid tumor  Minimal increase in the size of several small mesenteric and retroperitoneal lymph nodes  Attention on short-term follow-up  Multiple ill-defined hypodense lesions, some of which appear to be less conspicuous on this exam but not well evaluated without intravenous contrast  Moderately distended gallbladder containing sludge  No pericholecystic inflammation  Prostatomegaly  Urinary bladder is decompressed by a Haile catheter, however the degree of bladder wall thickening suggests superimposed chronic bladder outlet obstruction  Workstation performed: [id number]     Procedure: Us Right Upper Quadrant    Result Date: 9/4/2019  Narrative: RIGHT UPPER QUADRANT ULTRASOUND INDICATION:     rule out cholecystitis  COMPARISON:  CT from earlier in the day TECHNIQUE:   Real-time ultrasound of the right upper quadrant was performed with a curvilinear transducer with both volumetric sweeps and still imaging techniques  FINDINGS: PANCREAS:  Poorly visualized due to shadowing bowel gas  AORTA AND IVC:  Visualized portions are normal for patient age  LIVER: Size:  Mild cardiomegaly noted  The liver measures 17 4 cm in the midclavicular line  Contour:  Surface contour is smooth  Parenchyma:  Extensive hyperattenuating nodules  The largest is noted right hepatic lobe measuring 5 1 x 4 5 x 4 2 cm  Patient with known metastatic disease  No evidence of suspicious mass  Limited imaging of the main portal vein shows it to be patent and hepatopetal   BILIARY: The gallbladder is distended with sludge identified  No stones or pericholecystic fluid   No sonographic Siu's sign  No intrahepatic biliary dilatation  CBD measures 7 mm  No choledocholithiasis  KIDNEY: Right kidney measures 11 9 cm  Within normal limits  ASCITES:   None  Impression: 1  Gallbladder sludge without wall thickening or pericholecystic fluid  If clinical concern persists, consider follow-up HIDA scan  2  Multiple hepatic lesions consistent with known metastatic disease   Workstation performed: [id number]       Current Facility-Administered Medications   Medication Dose Route Frequency    acetaminophen (TYLENOL) tablet 650 mg  650 mg Oral Q6H PRN    amLODIPine (NORVASC) tablet 10 mg  10 mg Oral Daily    aspirin chewable tablet 81 mg  81 mg Oral Daily    b complex-vitamin C-folic acid (NEPHROCAPS) capsule 1 capsule  1 capsule Oral Daily With Dinner    cholecalciferol (VITAMIN D3) tablet 1,000 Units  1,000 Units Oral Daily    colchicine (COLCRYS) tablet 0 6 mg  0 6 mg Oral Daily    heparin (porcine) subcutaneous injection 5,000 Units  5,000 Units Subcutaneous Q8H [address]    HYDROmorphone (DILAUDID) injection 0 5 mg  0 5 mg Intravenous Q3H PRN    insulin lispro (HumaLOG) 100 units/mL subcutaneous injection 1-5 Units  1-5 Units Subcutaneous TID AC    insulin lispro (HumaLOG) 100 units/mL subcutaneous injection 1-5 Units  1-5 Units Subcutaneous HS    methenamine hippurate (HIPREX) tablet 1 g  1 g Oral BID With Meals    ondansetron (ZOFRAN) injection 4 mg  4 mg Intravenous Q6H PRN    oxyCODONE-acetaminophen (PERCOCET) 5-325 mg per tablet 1 tablet  1 tablet Oral Q6H PRN    pantoprazole (PROTONIX) EC tablet 40 mg  40 mg Oral Early Morning    piperacillin-tazobactam (ZOSYN) 3 375 g in sodium chloride 0 9 % 50 mL IVPB  3 375 g Intravenous Q6H    sodium chloride 0 9 % infusion  100 mL/hr Intravenous Continuous    tamsulosin (FLOMAX) capsule 0 4 mg  0 4 mg Oral Daily With Dinner     Medications Discontinued During This Encounter   Medication Reason    HYDROmorphone (DILAUDID) injection 0 5 mg     sodium chloride 0 9 % infusion     amLODIPine (NORVASC) 2 5 mg tablet        Bethel Stewart DO

## 2019-09-06 NOTE — PROGRESS NOTES
Progress Note - [place] 1940, [de-identified] y o  male MRN: [id number]    Unit/Bed#: -02 Encounter: [id number]    Primary Care Provider: Luis Eduardo Barry DO   Date and time admitted to hospital: 9/4/2019  3:03 AM        * Generalized abdominal pain  Assessment & Plan  · Suspected due to cholecystitis with marked leukocytosis this AM   · Continue with pip/sathya day #2  · procalcitonin slightly elevated- will trend  · US RUQ shows sludge, HIDA scan is indicative of cholecystitis   · IR is consult for percutaneous cholecystostomy  · Will continue pain control as needed  · CT scan of the abdomen/pelvis results reviewed      Urinary retention  Assessment & Plan  · With obstructive uropathy and chronic garcia   · Urology input appreciated   · CT shows suspected right hydronephrosis  Right upper quadrant ultrasound shows normal right kidney  Hydronephrosis is resolved  · Continue supportive measures at this time no urological intervention needed    Acute kidney injury superimposed on chronic kidney disease ([facility] [address] )  Assessment & Plan  · With CKD 3 with baseline Cr 1 4-1 5 mg/dL   · Patient with chronic urinary retention and chronic Garcia catheter  SILVERIO is due to ATN     · Renal function slightly increased today  · Will continue with tamsulosin  · IV fluids  · CT scan of the abdomen/pelvis suggested new hydroureteronephrosis  · Monitor urine output  · Nephrology input appreciated  · Avoid nephrotoxins  · Monitor renal function closely    Neuroendocrine tumor  Assessment & Plan  · Patient currently on monthly chemo injections  · Continue outpatient follow-up with Dr Ramírez Moise  · Continue supportive measures    Accelerated hypertension  Assessment & Plan  · Pain is contributing to this as well  · BP has improved from admission  · Will continue home meds  · Hydralazine as needed   · Monitor BP closely     Iron deficiency anemia secondary to inadequate dietary iron intake  Assessment & Plan  · Likely multifactorial  · No signs of acute bleeding  · Will monitor H&H and transfuse as needed  · H&H stable     Metabolic acidosis  Assessment & Plan  · Possibly secondary to acute renal failure  · Continue IV fluids  · Nephrology input appreciated  · Monitor closely         VTE Pharmacologic Prophylaxis: Pharmacologic: Heparin    Patient Centered Rounds: I have performed bedside rounds with nursing staff today  Discussions with Specialists or Other Care Team Provider: nursing, surgery, CM, PT/OT, renal, urology   Education and Discussions with Family / Patient: patient and girlfriend     Current Length of Stay: 2 day(s)    Current Patient Status: Inpatient   Certification Statement: The patient will continue to require additional inpatient hospital stay due to abdominal pain     Discharge Plan: pending hospital course     Code Status: Level 1 - Full Code    Subjective:   RUQ pain is slightly better today  No nausea  He has been NPO for HIDA scan when I saw him  Objective:     Vitals:   Temp (24hrs), Av °F (37 2 °C), Min:98 4 °F (36 9 °C), Max:99 7 °F (37 6 °C)    Temp:  [98 4 °F (36 9 °C)-99 7 °F (37 6 °C)] 98 4 °F (36 9 °C)  HR:  [86-98] 86  Resp:  [18] 18  BP: (152-175)/(74-81) 168/81  SpO2:  [94 %-96 %] 96 %  Body mass index is 23 46 kg/m²  Input and Output Summary (last 24 hours): Intake/Output Summary (Last 24 hours) at 2019 1421  Last data filed at 2019 1126  Gross per 24 hour   Intake 0 ml   Output 1000 ml   Net -1000 ml       Physical Exam:     Physical Exam   Constitutional: He is oriented to person, place, and time  He appears well-developed and well-nourished  No distress  HENT:   Head: Normocephalic and atraumatic  Mouth/Throat: Oropharynx is clear and moist    Eyes: Pupils are equal, round, and reactive to light  Conjunctivae and EOM are normal    Neck: Normal range of motion  Neck supple  No thyromegaly present     Cardiovascular: Normal rate, regular rhythm, normal heart sounds and intact distal pulses  Pulmonary/Chest: Effort normal and breath sounds normal  No respiratory distress  He has no wheezes  Abdominal: Bowel sounds are normal  He exhibits no distension  There is no tenderness  Musculoskeletal: Normal range of motion  He exhibits no edema or deformity  Neurological: He is alert and oriented to person, place, and time  He has normal reflexes  Skin: Skin is warm and dry  No erythema  Psychiatric: He has a normal mood and affect  His behavior is normal  Thought content normal    Vitals reviewed  Additional Data:     Labs:    Results from last 7 days   Lab Units 09/06/19  0610   WBC Thousand/uL 17 50*   HEMOGLOBIN g/dL 10 6*   HEMATOCRIT % 31 1*   PLATELETS Thousands/uL 201   NEUTROS PCT % 85*   LYMPHS PCT % 5*   MONOS PCT % 9   EOS PCT % 1     Results from last 7 days   Lab Units 09/06/19  0610   POTASSIUM mmol/L 3 5   CHLORIDE mmol/L 111*   CO2 mmol/L 17*   BUN mg/dL 32*   CREATININE mg/dL 2 22*   CALCIUM mg/dL 8 7   ALK PHOS U/L 82   ALT U/L 13   AST U/L 11*         Results from last 7 days   Lab Units 09/06/19  1112 09/05/19  2250 09/05/19  1553 09/05/19  1128 09/05/19  0640 09/04/19 2011 09/04/19  1612 09/04/19  1129 09/04/19  0838   POC GLUCOSE mg/dl 233* 252* 275* 318* 247* 237* 271* 269* 256*           * I Have Reviewed All Lab Data Listed Above  * Additional Pertinent Lab Tests Reviewed:  [place] [id number] Admission  Reviewed    Imaging:  Imaging Reports Reviewed Today Include: HIDA scan     Recent Cultures (last 7 days):           Last 24 Hours Medication List:     Current Facility-Administered Medications:  acetaminophen 650 mg Oral Q6H PRN Olealfredo Merrill, PA-C    amLODIPine 10 mg Oral Daily Oley Morilion, PA-C    aspirin 81 mg Oral Daily Oley Morilion, PA-C    b complex-vitamin C-folic acid 1 capsule Oral Daily With Kirsten Stock PA-C    cholecalciferol 1,000 Units Oral Daily Oley Morita, PA-C    colchicine 0 6 mg Oral Daily Oley MoriRL seymour-C HYDROmorphone 0 5 mg Intravenous Q3H PRN RIKA Goldstein    insulin lispro 1-5 Units Subcutaneous TID AC Josy Maher MD    insulin lispro 1-5 Units Subcutaneous HS Leopoldo Hans, PA-C    methenamine hippurate 1 g Oral BID With Meals Leopoldo Hans, PA-C    ondansetron 4 mg Intravenous Q6H PRN Leopoldo Hans, PA-C    oxyCODONE-acetaminophen 1 tablet Oral Q6H PRN RIKA Goldstein    pantoprazole 40 mg Oral Early Morning Leopoldo Hans, PA-C    piperacillin-tazobactam 3 375 g Intravenous Q6H Sharlene Perez MD Last Rate: 3 375 g (09/06/19 1207)   sodium chloride 100 mL/hr Intravenous Continuous Torey Mehta DO Last Rate: 100 mL/hr (09/06/19 0551)   tamsulosin 0 4 mg Oral Daily With Dinner Leopoldo Hans, PA-C         Today, Patient Was Seen By: RIKA Goldstein    ** Please Note: Dictation voice to text software may have been used in the creation of this document   **

## 2019-09-06 NOTE — ASSESSMENT & PLAN NOTE
· Suspected due to cholecystitis with marked leukocytosis this AM   · Continue with pip/sathya day #2    · procalcitonin slightly elevated- will trend  · US RUQ shows sludge, HIDA scan is indicative of cholecystitis   · IR is consult for percutaneous cholecystostomy  · Will continue pain control as needed  · CT scan of the abdomen/pelvis results reviewed

## 2019-09-06 NOTE — ASSESSMENT & PLAN NOTE
· Patient currently on monthly chemo injections  · Continue outpatient follow-up with Dr Danelle Archer  · Continue supportive measures

## 2019-09-06 NOTE — PLAN OF CARE
Problem: GASTROINTESTINAL - ADULT  Goal: Maintains or returns to baseline bowel function  Description  INTERVENTIONS:  - Assess bowel function  - Encourage oral fluids to ensure adequate hydration  - Administer IV fluids if ordered to ensure adequate hydration  - Administer ordered medications as needed  - Encourage mobilization and activity  - Consider nutritional services referral to assist patient with adequate nutrition and appropriate food choices  Outcome: Progressing     Problem: GENITOURINARY - ADULT  Goal: Maintains or returns to baseline urinary function  Description  INTERVENTIONS:  - Assess urinary function  - Encourage oral fluids to ensure adequate hydration if ordered  - Administer IV fluids as ordered to ensure adequate hydration  - Administer ordered medications as needed  - Offer frequent toileting  - Follow urinary retention protocol if ordered  Outcome: Progressing     Problem: GENITOURINARY - ADULT  Goal: Urinary catheter remains patent  Description  INTERVENTIONS:  - Assess patency of urinary catheter  - If patient has a chronic garcia, consider changing catheter if non-functioning  - Follow guidelines for intermittent irrigation of non-functioning urinary catheter  Outcome: Progressing

## 2019-09-06 NOTE — ASSESSMENT & PLAN NOTE
· With CKD 3 with baseline Cr 1 4-1 5 mg/dL   · Patient with chronic urinary retention and chronic Haile catheter  SILVERIO is due to ATN     · Renal function slightly increased today  · Will continue with tamsulosin  · IV fluids  · CT scan of the abdomen/pelvis suggested new hydroureteronephrosis  · Monitor urine output  · Nephrology input appreciated  · Avoid nephrotoxins  · Monitor renal function closely

## 2019-09-06 NOTE — NURSING NOTE
Patient returned from IR post placement of bulb drain  Drain noted to right upper quadrant draining bile  Dressing to insertion site clean, dry and intact  Patient appears to be in significant pain and is drowsy due to sedation administered however is now resting comfortably with wife and sister at bedside  Will continue to monitor

## 2019-09-06 NOTE — ASSESSMENT & PLAN NOTE
· Possibly secondary to acute renal failure  · Continue IV fluids  · Nephrology input appreciated  · Monitor closely

## 2019-09-06 NOTE — PROGRESS NOTES
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  Hydronephrosis has resolved  Creatinine 2 22 today  Plan:  Continue Haile catheter  Await general surgery recommendations regarding hepatobiliary scan  Subjective:   No residual pain  Tolerating Haile catheter well  Objective:     Vitals: Blood pressure 168/81, pulse 86, temperature 98 4 °F (36 9 °C), temperature source Tympanic, resp  rate 18, height 5' 8" (1 727 m), weight 70 kg (154 lb 5 2 oz), SpO2 96 %  ,Body mass index is 23 46 kg/m²  Intake/Output Summary (Last 24 hours) at 9/6/2019 1141  Last data filed at 9/5/2019 1444  Gross per 24 hour   Intake 1860 ml   Output 1000 ml   Net 860 ml       Physical Exam: Male genitalia: normal   Haile draining clear yellow urine  Invasive Devices     Peripheral Intravenous Line            Peripheral IV 09/04/19 Right Arm 2 days          Drain            Urethral Catheter Latex 16 Fr  69 days                Lab, Imaging and other studies: I have personally reviewed pertinent reports      VTE Pharmacologic Prophylaxis: Sequential compression device (Venodyne)   VTE Mechanical Prophylaxis: sequential compression device

## 2019-09-07 LAB
ANION GAP SERPL CALCULATED.3IONS-SCNC: 10 MMOL/L (ref 4–13)
BASOPHILS # BLD AUTO: 0 THOUSANDS/ΜL (ref 0–0.1)
BASOPHILS NFR BLD AUTO: 0 % (ref 0–2)
BUN SERPL-MCNC: 44 MG/DL (ref 7–25)
CALCIUM SERPL-MCNC: 8.7 MG/DL (ref 8.6–10.5)
CHLORIDE SERPL-SCNC: 112 MMOL/L (ref 98–107)
CO2 SERPL-SCNC: 19 MMOL/L (ref 21–31)
CREAT SERPL-MCNC: 2.83 MG/DL (ref 0.7–1.3)
EOSINOPHIL # BLD AUTO: 0 THOUSAND/ΜL (ref 0–0.61)
EOSINOPHIL NFR BLD AUTO: 0 % (ref 0–5)
ERYTHROCYTE [DISTWIDTH] IN BLOOD BY AUTOMATED COUNT: 15 % (ref 11.5–14.5)
GFR SERPL CREATININE-BSD FRML MDRD: 20 ML/MIN/1.73SQ M
GLUCOSE SERPL-MCNC: 222 MG/DL (ref 65–140)
GLUCOSE SERPL-MCNC: 234 MG/DL (ref 65–99)
GLUCOSE SERPL-MCNC: 237 MG/DL (ref 65–140)
GLUCOSE SERPL-MCNC: 274 MG/DL (ref 65–140)
GLUCOSE SERPL-MCNC: 285 MG/DL (ref 65–140)
HCT VFR BLD AUTO: 24.7 % (ref 42–47)
HGB BLD-MCNC: 8.3 G/DL (ref 14–18)
LYMPHOCYTES # BLD AUTO: 1.1 THOUSANDS/ΜL (ref 0.6–4.47)
LYMPHOCYTES NFR BLD AUTO: 8 % (ref 21–51)
MCH RBC QN AUTO: 30 PG (ref 26–34)
MCHC RBC AUTO-ENTMCNC: 33.8 G/DL (ref 31–37)
MCV RBC AUTO: 89 FL (ref 81–99)
MONOCYTES # BLD AUTO: 1 THOUSAND/ΜL (ref 0.17–1.22)
MONOCYTES NFR BLD AUTO: 7 % (ref 2–12)
NEUTROPHILS # BLD AUTO: 12.3 THOUSANDS/ΜL (ref 1.4–6.5)
NEUTS SEG NFR BLD AUTO: 85 % (ref 42–75)
PLATELET # BLD AUTO: 244 THOUSANDS/UL (ref 149–390)
PMV BLD AUTO: 8.4 FL (ref 8.6–11.7)
POTASSIUM SERPL-SCNC: 3.7 MMOL/L (ref 3.5–5.5)
PROCALCITONIN SERPL-MCNC: 1.54 NG/ML
RBC # BLD AUTO: 2.78 MILLION/UL (ref 4.3–5.9)
SODIUM SERPL-SCNC: 141 MMOL/L (ref 134–143)
WBC # BLD AUTO: 14.4 THOUSAND/UL (ref 4.8–10.8)

## 2019-09-07 PROCEDURE — 99232 SBSQ HOSP IP/OBS MODERATE 35: CPT | Performed by: SPECIALIST

## 2019-09-07 PROCEDURE — 99232 SBSQ HOSP IP/OBS MODERATE 35: CPT | Performed by: NURSE PRACTITIONER

## 2019-09-07 PROCEDURE — 99232 SBSQ HOSP IP/OBS MODERATE 35: CPT | Performed by: INTERNAL MEDICINE

## 2019-09-07 PROCEDURE — 82948 REAGENT STRIP/BLOOD GLUCOSE: CPT

## 2019-09-07 PROCEDURE — 84145 PROCALCITONIN (PCT): CPT | Performed by: NURSE PRACTITIONER

## 2019-09-07 PROCEDURE — 85025 COMPLETE CBC W/AUTO DIFF WBC: CPT | Performed by: NURSE PRACTITIONER

## 2019-09-07 PROCEDURE — 80048 BASIC METABOLIC PNL TOTAL CA: CPT | Performed by: NURSE PRACTITIONER

## 2019-09-07 RX ADMIN — INSULIN LISPRO 3 UNITS: 100 INJECTION, SOLUTION INTRAVENOUS; SUBCUTANEOUS at 11:32

## 2019-09-07 RX ADMIN — INSULIN LISPRO 3 UNITS: 100 INJECTION, SOLUTION INTRAVENOUS; SUBCUTANEOUS at 17:11

## 2019-09-07 RX ADMIN — Medication 1 CAPSULE: at 17:10

## 2019-09-07 RX ADMIN — AMLODIPINE BESYLATE 10 MG: 5 TABLET ORAL at 08:39

## 2019-09-07 RX ADMIN — SODIUM CHLORIDE 100 ML/HR: 9 INJECTION, SOLUTION INTRAVENOUS at 05:35

## 2019-09-07 RX ADMIN — PIPERACILLIN SODIUM AND TAZOBACTAM SODIUM 3.38 G: 3; .375 INJECTION, POWDER, LYOPHILIZED, FOR SOLUTION INTRAVENOUS at 03:56

## 2019-09-07 RX ADMIN — ONDANSETRON 4 MG: 2 INJECTION INTRAMUSCULAR; INTRAVENOUS at 08:48

## 2019-09-07 RX ADMIN — METHENAMINE HIPPURATE 1 G: 1 TABLET ORAL at 08:38

## 2019-09-07 RX ADMIN — PANTOPRAZOLE SODIUM 40 MG: 40 TABLET, DELAYED RELEASE ORAL at 05:34

## 2019-09-07 RX ADMIN — VITAMIN D, TAB 1000IU (100/BT) 1000 UNITS: 25 TAB at 08:39

## 2019-09-07 RX ADMIN — ASPIRIN 81 MG 81 MG: 81 TABLET ORAL at 08:39

## 2019-09-07 RX ADMIN — PIPERACILLIN SODIUM AND TAZOBACTAM SODIUM 2.25 G: 2; .25 INJECTION, POWDER, LYOPHILIZED, FOR SOLUTION INTRAVENOUS at 17:11

## 2019-09-07 RX ADMIN — METHENAMINE HIPPURATE 1 G: 1 TABLET ORAL at 17:11

## 2019-09-07 RX ADMIN — PIPERACILLIN SODIUM AND TAZOBACTAM SODIUM 2.25 G: 2; .25 INJECTION, POWDER, LYOPHILIZED, FOR SOLUTION INTRAVENOUS at 21:30

## 2019-09-07 RX ADMIN — HYDROMORPHONE HYDROCHLORIDE 0.5 MG: 1 INJECTION, SOLUTION INTRAMUSCULAR; INTRAVENOUS; SUBCUTANEOUS at 08:48

## 2019-09-07 RX ADMIN — PIPERACILLIN SODIUM AND TAZOBACTAM SODIUM 3.38 G: 3; .375 INJECTION, POWDER, LYOPHILIZED, FOR SOLUTION INTRAVENOUS at 11:32

## 2019-09-07 RX ADMIN — TAMSULOSIN HYDROCHLORIDE 0.4 MG: 0.4 CAPSULE ORAL at 17:11

## 2019-09-07 RX ADMIN — COLCHICINE 0.6 MG: 0.6 TABLET, FILM COATED ORAL at 08:38

## 2019-09-07 RX ADMIN — INSULIN LISPRO 2 UNITS: 100 INJECTION, SOLUTION INTRAVENOUS; SUBCUTANEOUS at 21:31

## 2019-09-07 NOTE — SOCIAL WORK
Chart reviewed, no d/c for today, pt has a drain in place, and is on iv antibiotics, pt is active with [facility], they will need to be called with the d/c and d/c instructions need to be faxed to # [phone], pt's SO will transport the pt home when stabl e for d/c , pt needs to be assessed by pt dept to make sure pt is,ok to go home with [facility], he does have nursing , pt, and an aide come, cm will continue to follow and reassess for any additional d/c needs

## 2019-09-07 NOTE — ASSESSMENT & PLAN NOTE
· With CKD 3 with baseline Cr 1 4-1 5 mg/dL   · Patient with chronic urinary retention and chronic Haile catheter  SILVERIO is due to ATN     · Renal function is not much improved   · Will continue with tamsulosin  · IV fluids  · CT scan of the abdomen/pelvis suggested new hydroureteronephrosis  · Monitor urine output  · Nephrology input appreciated  · Avoid nephrotoxins  · Monitor renal function closely

## 2019-09-07 NOTE — NURSING NOTE
Pt alert and orientated, pleasant and cooperative  Heart is regular, trace edema noted to bilat lower extremities  Lungs are clear on room air  16F garcia in place, chronic  RUQ bulb drain, placed today, 30cc green drainage emptied  Diluadid given for 9/10 pain at drain site  Pt repositioned q2 hours  Hourly rounding discussed  Call bell is within reach

## 2019-09-07 NOTE — PROGRESS NOTES
Progress Note - Manav Dudley 1940, [de-identified] y o  male MRN: [id number]    Unit/Bed#: -02 Encounter: [id number]    Primary Care Provider: Kristan Howe DO   Date and time admitted to hospital: 9/4/2019  3:03 AM        * Generalized abdominal pain  Assessment & Plan  · Suspected due to cholecystitis with marked leukocytosis this AM   · Continue with pip/sathya day #3  · procalcitonin slightly elevated- will trend, leukocytosis is improving  · US RUQ shows sludge, HIDA scan is indicative of cholecystitis   · Gallbladder fluid culture- pending   · Patient underwent a percutaneous cholecystostomy by IR 9/6  · Pain is improving   · Will continue pain control as needed  · CT scan of the abdomen/pelvis results reviewed      Urinary retention  Assessment & Plan  · With obstructive uropathy and chronic garcia   · Urology input appreciated   · CT shows suspected right hydronephrosis  Right upper quadrant ultrasound shows normal right kidney  Hydronephrosis is resolved  · Continue supportive measures at this time no urological intervention needed    Acute kidney injury superimposed on chronic kidney disease ([facility] [address] )  Assessment & Plan  · With CKD 3 with baseline Cr 1 4-1 5 mg/dL   · Patient with chronic urinary retention and chronic Garcia catheter  SILVERIO is due to ATN     · Renal function is not much improved   · Will continue with tamsulosin  · IV fluids  · CT scan of the abdomen/pelvis suggested new hydroureteronephrosis  · Monitor urine output  · Nephrology input appreciated  · Avoid nephrotoxins  · Monitor renal function closely    Neuroendocrine tumor  Assessment & Plan  · With metastatic disease   · Patient currently on monthly chemo injections  · Continue outpatient follow-up with Dr Bulah Hamman   · Continue supportive measures    Accelerated hypertension  Assessment & Plan  · Pain is contributing to this as well  · BP has improved from admission  · Will continue home meds  · Hydralazine as needed   · Monitor BP closely     Iron deficiency anemia secondary to inadequate dietary iron intake  Assessment & Plan  · Likely multifactorial  · No signs of acute bleeding  · Will monitor H&H and transfuse as needed  · H&H stable     Metabolic acidosis  Assessment & Plan  · Possibly secondary to acute renal failure  · Continue IV fluids  · Nephrology input appreciated  · Monitor closely         VTE Pharmacologic Prophylaxis: Pharmacologic: Heparin    Patient Centered Rounds: I have performed bedside rounds with nursing staff today  Discussions with Specialists or Other Care Team Provider: surgery, renal, urology, nursing, CM  Education and Discussions with Family / Patient: patient and girlfriend     Current Length of Stay: 3 day(s)    Current Patient Status: Inpatient   Certification Statement: The patient will continue to require additional inpatient hospital stay due to abdominal pain     Discharge Plan: pending hospital course     Code Status: Level 1 - Full Code    Subjective:   Pain is slightly improved  No n/v  Total output from percutaneous cholecystostomy- 950ml  Objective:     Vitals:   Temp (24hrs), Av 2 °F (36 8 °C), Min:97 8 °F (36 6 °C), Max:98 8 °F (37 1 °C)    Temp:  [97 8 °F (36 6 °C)-98 8 °F (37 1 °C)] 98 8 °F (37 1 °C)  HR:  [] 71  Resp:  [18] 18  BP: (112-165)/(58-93) 148/74  SpO2:  [96 %-100 %] 97 %  Body mass index is 24 kg/m²  Input and Output Summary (last 24 hours): Intake/Output Summary (Last 24 hours) at 2019 1134  Last data filed at 2019 8059  Gross per 24 hour   Intake    Output 1805 ml   Net -1805 ml       Physical Exam:     Physical Exam   Constitutional: He is oriented to person, place, and time  He appears well-developed  No distress  Chronically ill appearing   HENT:   Head: Normocephalic and atraumatic  Mouth/Throat: Oropharynx is clear and moist    Eyes: Pupils are equal, round, and reactive to light   Conjunctivae and EOM are normal    Neck: Normal range of motion  Neck supple  No thyromegaly present  Cardiovascular: Normal rate, regular rhythm, normal heart sounds and intact distal pulses  Pulmonary/Chest: Effort normal and breath sounds normal  No respiratory distress  He has no wheezes  Abdominal: Soft  Bowel sounds are normal  He exhibits no distension  There is tenderness (upper quadrants )  Right upper quadrant with percutaneous cholecystostomy to with greenish bile drainage  Genitourinary:   Genitourinary Comments: +garcia catheter   Musculoskeletal: Normal range of motion  He exhibits no edema or deformity  Neurological: He is alert and oriented to person, place, and time  He has normal reflexes  No cranial nerve deficit  Skin: Skin is warm and dry  No erythema  Psychiatric: He has a normal mood and affect  His behavior is normal  Thought content normal    Vitals reviewed  Additional Data:     Labs:    Results from last 7 days   Lab Units 09/07/19  0503   WBC Thousand/uL 14 40*   HEMOGLOBIN g/dL 8 3*   HEMATOCRIT % 24 7*   PLATELETS Thousands/uL 244   NEUTROS PCT % 85*   LYMPHS PCT % 8*   MONOS PCT % 7   EOS PCT % 0     Results from last 7 days   Lab Units 09/07/19  0503 09/06/19  0610   POTASSIUM mmol/L 3 7 3 5   CHLORIDE mmol/L 112* 111*   CO2 mmol/L 19* 17*   BUN mg/dL 44* 32*   CREATININE mg/dL 2 83* 2 22*   CALCIUM mg/dL 8 7 8 7   ALK PHOS U/L  --  82   ALT U/L  --  13   AST U/L  --  11*         Results from last 7 days   Lab Units 09/07/19  1113 09/07/19  0631 09/06/19  2000 09/06/19  1632 09/06/19  1112 09/05/19  2250 09/05/19  1553 09/05/19  1128 09/05/19  0640 09/04/19 2011 09/04/19  1612 09/04/19  1129   POC GLUCOSE mg/dl 285* 222* 279* 255* 233* 252* 275* 318* 247* 237* 271* 269*           * I Have Reviewed All Lab Data Listed Above  * Additional Pertinent Lab Tests Reviewed:  Laura 65 Admission  Reviewed    Imaging:  Imaging Reports Reviewed Today Include: SHIRA     Recent Cultures (last 7 days): Results from last 7 days   Lab Units 09/06/19  1626   GRAM STAIN RESULT  No Polys or Bacteria seen       Last 24 Hours Medication List:     Current Facility-Administered Medications:  acetaminophen 650 mg Oral Q6H PRN Edel Vaughn PA-C    amLODIPine 10 mg Oral Daily Edel Vaughn PA-C    aspirin 81 mg Oral Daily Edel Vaughn PA-C    b complex-vitamin C-folic acid 1 capsule Oral Daily With Dinner Edel Vaughn PA-C    cholecalciferol 1,000 Units Oral Daily Edel Vaughn PA-C    colchicine 0 6 mg Oral Daily Eedl Vaughn PA-C    HYDROmorphone 0 5 mg Intravenous Q3H PRN RIKA Brush    insulin lispro 1-5 Units Subcutaneous TID AC Amor Acevedo MD    insulin lispro 1-5 Units Subcutaneous HS Edel Vaughn PA-C    methenamine hippurate 1 g Oral BID With Meals Edel Vaughn PA-C    ondansetron 4 mg Intravenous Q6H PRN Edel Vaughn PA-C    oxyCODONE-acetaminophen 1 tablet Oral Q6H PRN RIKA Brush    pantoprazole 40 mg Oral Early Morning Edel Vaughn PA-C    piperacillin-tazobactam 3 375 g Intravenous Q6H Princess Sheela MD Last Rate: 3 375 g (09/07/19 1132)   sodium chloride 100 mL/hr Intravenous Continuous Torey Mehta DO Last Rate: 100 mL/hr (09/07/19 0535)   tamsulosin 0 4 mg Oral Daily With Dinner Edel Vaughn PA-C         Today, Patient Was Seen By: RIKA Brush    ** Please Note: Dictation voice to text software may have been used in the creation of this document   **

## 2019-09-07 NOTE — ASSESSMENT & PLAN NOTE
· With metastatic disease   · Patient currently on monthly chemo injections  · Continue outpatient follow-up with Dr Stephany Manuel   · Continue supportive measures

## 2019-09-07 NOTE — PROGRESS NOTES
Progress Note - Nephrology   Lio Postin [de-identified] y o  male MRN: [id number]  Unit/Bed#: -02 Encounter: [id number]    A/P:  1  Acute renal failure due to acute tubular necrosis             Creatinine continues to be slightly increased, continue with IV fluids for now  Today may be the 1st day the patient may be able to keep up with his fluid intake  My hope is that by tomorrow IV fluids can be stopped  Continue supportive treatment in the meantime  2  Chronic kidney disease stage 3 with baseline creatinine likely around 1 4 - 1 5 mg/dL              As mentioned in prior notes, patient has not been able to achieve this prior baseline creatinine of 0 9 - 1 mg/dL for some time  ===========  3  Probable diabetic nephropathy  4  Proteinuria  5  Acidosis                serum bicarbonate okay, continue monitor closely  6  Neuroendocrine tumor               Continue follow-up with Dr Chapincito Brian is continue with Lanreotide injections every 4 weeks for now   =============  7  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              Continue Haile catheter  8  Right hydroureter nephrosis              Continued observation per Urology recommendations  9  Abdominal pain with possible cholecystitis                patient is now status post cholecystostomy done on September 6th  As mentioned below, patient is not able to tolerate p o  Intake without severe abdominal pain    10  Iron deficiency anemia               resolved      Follow up reason for today's visit:  Acute kidney failure/chronic kidney disease    Generalized abdominal pain    Patient Active Problem List   Diagnosis    Weakness generalized    Type 2 diabetes mellitus with complication, with long-term current use of insulin ([address] )    Essential hypertension    Mixed hyperlipidemia    Cardiac disease    Generalized abdominal mass    Hydroureter    Metabolic acidosis    Iron deficiency anemia secondary to inadequate dietary iron intake    Syncope and collapse    Accelerated hypertension    Liver mass    Neuroendocrine tumor    Acute cystitis without hematuria    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)    Acute kidney injury superimposed on chronic kidney disease (HCC)    Acute pain of left knee    Pressure injury of right heel, unstageable (HCC)    Atherosclerosis of artery of extremity with ulceration (HCC)    Carcinoid syndrome (HCC)    Gram-negative bacteremia    Left hip pain    Acute cystitis with hematuria    Chronic indwelling Garcia catheter    Moderate protein-calorie malnutrition (HCC)    Biliary sludge    Urinary tract infection due to extended-spectrum beta lactamase (ESBL) producing Escherichia coli    Malignant neuroendocrine neoplasm (HCC)    Generalized abdominal pain    Urinary retention         Subjective:   Abdominal pain improved status post cholecystostomy  Patient was able to eat breakfast and lunch without severe pain which is a significant improvement for him  Objective:     Vitals: Blood pressure 148/74, pulse 71, temperature 98 8 °F (37 1 °C), temperature source Tympanic, resp  rate 18, height 5' 8" (1 727 m), weight 71 6 kg (157 lb 13 6 oz), SpO2 97 %  ,Body mass index is 24 kg/m²  Weight (last 2 days)     Date/Time   Weight    09/07/19 0600   71 6 (157 85)    09/05/19 0600   70 (154 32)                Intake/Output Summary (Last 24 hours) at 9/7/2019 1255  Last data filed at 9/7/2019 1138  Gross per 24 hour   Intake    Output 1905 ml   Net -1905 ml     I/O last 3 completed shifts: In: 0   Out: 855 [Urine:500; Drains:355]    Urethral Catheter Latex 16 Fr  (Active)   Reasons to continue Urinary Catheter  Chronic urinary catheter 9/7/2019  8:52 AM   Goal for Removal N/A- chronic garcia 9/7/2019  8:52 AM   Site Assessment Clean;Skin intact; Patent 9/7/2019  8:52 AM   Collection Container Standard drainage bag 9/7/2019  8:52 AM   Securement Method Securing device (Describe) 9/7/2019  8:52 AM   Output (mL) 900 mL 9/5/2019  6:17 AM       Physical Exam: /74 (BP Location: Left arm)   Pulse 71   Temp 98 8 °F (37 1 °C) (Tympanic)   Resp 18   Ht 5' 8" (1 727 m)   Wt 71 6 kg (157 lb 13 6 oz)   SpO2 97%   BMI 24 00 kg/m²     General Appearance:    Alert, cooperative, no distress, appears stated age   Head:    Normocephalic, without obvious abnormality, atraumatic   Eyes:    Conjunctiva/corneas clear   Ears:    Normal external ears   Nose:   Nares normal, septum midline, mucosa normal, no drainage    or sinus tenderness   Throat:   Lips, mucosa, and tongue normal; teeth and gums normal   Neck:   Supple   Back:     Symmetric, no curvature, ROM normal, no CVA tenderness   Lungs:     Clear to auscultation bilaterally, respirations unlabored   Chest wall:    No tenderness or deformity   Heart:    Regular rate and rhythm, S1 and S2 normal, no murmur, rub   or gallop   Abdomen:     Soft, non-tender, bowel sounds active   Extremities:   Extremities normal, atraumatic, no cyanosis or edema   Skin:   Skin color, texture, turgor normal, no rashes or lesions   Lymph nodes:   Cervical normal   Neurologic:   CNII-XII intact            Lab, Imaging and other studies: I have personally reviewed pertinent labs  CBC:   Lab Results   Component Value Date    WBC 14 40 (H) 09/07/2019    HGB 8 3 (L) 09/07/2019    HCT 24 7 (L) 09/07/2019    MCV 89 09/07/2019     09/07/2019    MCH 30 0 09/07/2019    MCHC 33 8 09/07/2019    RDW 15 0 (H) 09/07/2019    MPV 8 4 (L) 09/07/2019     CMP:   Lab Results   Component Value Date    K 3 7 09/07/2019     (H) 09/07/2019    CO2 19 (L) 09/07/2019    BUN 44 (H) 09/07/2019    CREATININE 2 83 (H) 09/07/2019    CALCIUM 8 7 09/07/2019    EGFR 20 09/07/2019           Results from last 7 days   Lab Units 09/07/19  0503 09/06/19  0610 09/05/19  0558  09/04/19  0410 09/03/19  1104   POTASSIUM mmol/L 3 7 3 5 3 7   < > 4 0 4 5   CHLORIDE mmol/L 112* 111* 109*   < > 110* 113*   CO2 mmol/L 19* 17* 18*   < > 12* 18*   BUN mg/dL 44* 32* 35*   < > 44* 47*   CREATININE mg/dL 2 83* 2 22* 2 04*   < > 2 36* 2 72*   CALCIUM mg/dL 8 7 8 7 9 3   < > 8 6 9 5   ALK PHOS U/L  --  82  --   --  83 125*   ALT U/L  --  13  --   --  7 15   AST U/L  --  11*  --   --  8* 8    < > = values in this interval not displayed  Phosphorus: No results found for: PHOS  Magnesium: No results found for: MG  Urinalysis: No results found for: Susen Valladares, SPECGRAV, PHUR, LEUKOCYTESUR, NITRITE, PROTEINUA, GLUCOSEU, KETONESU, BILIRUBINUR, BLOODU  Ionized Calcium: No results found for: CAION  Coagulation: No results found for: PT, INR, APTT  Troponin: No results found for: TROPONINI  ABG: No results found for: PHART, EOJ7BNW, PO2ART, QGI7WZN, D1XDVJFB, BEART, SOURCE  Radiology review:     IMAGING  Procedure: Nm Hepatobiliary    Result Date: 9/6/2019  Narrative: HEPATOBILIARY SCAN INDICATION: Abdominal pain and nausea, vomiting  COMPARISON:  Right upper quadrant ultrasound 9/4/2019 TECHNIQUE:   Following the intravenous administration of 5 2 mCi Tc-99m labeled mebrofenin, dynamic abdominal imaging was obtained in the anterior projection over a 60 minute time period  FINDINGS:  There is prompt, uniform accumulation with normal clearance of the radiopharmaceutical by the liver  There is normal filling of the intrahepatic ducts, common bile duct and bowel  At the end of 60 minutes the gallbladder did not visualize and therefore delayed imaging was obtained at both 2 and 4 hours  Again, the gallbladder was not seen     Impression: Nonvisualization of the gallbladder even on 4 hour delayed images  This finding is considered suspicious for cystic duct obstruction and in the appropriate clinical setting, cholecystitis  The examination demonstrates a significant  finding and was documented as such in [facility] for liaison and referring practitioner notification   Workstation performed: [id number]     Procedure: Us Right Upper Quadrant    Result Date: 9/4/2019  Narrative: RIGHT UPPER QUADRANT ULTRASOUND INDICATION:     rule out cholecystitis  COMPARISON:  CT from earlier in the day TECHNIQUE:   Real-time ultrasound of the right upper quadrant was performed with a curvilinear transducer with both volumetric sweeps and still imaging techniques  FINDINGS: PANCREAS:  Poorly visualized due to shadowing bowel gas  AORTA AND IVC:  Visualized portions are normal for patient age  LIVER: Size:  Mild cardiomegaly noted  The liver measures 17 4 cm in the midclavicular line  Contour:  Surface contour is smooth  Parenchyma:  Extensive hyperattenuating nodules  The largest is noted right hepatic lobe measuring 5 1 x 4 5 x 4 2 cm  Patient with known metastatic disease  No evidence of suspicious mass  Limited imaging of the main portal vein shows it to be patent and hepatopetal   BILIARY: The gallbladder is distended with sludge identified  No stones or pericholecystic fluid  No sonographic Siu's sign  No intrahepatic biliary dilatation  CBD measures 7 mm  No choledocholithiasis  KIDNEY: Right kidney measures 11 9 cm  Within normal limits  ASCITES:   None  Impression: 1  Gallbladder sludge without wall thickening or pericholecystic fluid  If clinical concern persists, consider follow-up HIDA scan  2  Multiple hepatic lesions consistent with known metastatic disease   Workstation performed: [id number]       Current Facility-Administered Medications   Medication Dose Route Frequency    acetaminophen (TYLENOL) tablet 650 mg  650 mg Oral Q6H PRN    amLODIPine (NORVASC) tablet 10 mg  10 mg Oral Daily    aspirin chewable tablet 81 mg  81 mg Oral Daily    b complex-vitamin C-folic acid (NEPHROCAPS) capsule 1 capsule  1 capsule Oral Daily With Dinner    cholecalciferol (VITAMIN D3) tablet 1,000 Units  1,000 Units Oral Daily    colchicine (COLCRYS) tablet 0 6 mg  0 6 mg Oral Daily    HYDROmorphone (DILAUDID) injection 0 5 mg  0 5 mg Intravenous Q3H PRN    insulin lispro (HumaLOG) 100 units/mL subcutaneous injection 1-5 Units  1-5 Units Subcutaneous TID AC    insulin lispro (HumaLOG) 100 units/mL subcutaneous injection 1-5 Units  1-5 Units Subcutaneous HS    methenamine hippurate (HIPREX) tablet 1 g  1 g Oral BID With Meals    ondansetron (ZOFRAN) injection 4 mg  4 mg Intravenous Q6H PRN    oxyCODONE-acetaminophen (PERCOCET) 5-325 mg per tablet 1 tablet  1 tablet Oral Q6H PRN    pantoprazole (PROTONIX) EC tablet 40 mg  40 mg Oral Early Morning    piperacillin-tazobactam (ZOSYN) 3 375 g in sodium chloride 0 9 % 50 mL IVPB  3 375 g Intravenous Q6H    sodium chloride 0 9 % infusion  100 mL/hr Intravenous Continuous    tamsulosin (FLOMAX) capsule 0 4 mg  0 4 mg Oral Daily With Dinner     Medications Discontinued During This Encounter   Medication Reason    HYDROmorphone (DILAUDID) injection 0 5 mg     sodium chloride 0 9 % infusion     amLODIPine (NORVASC) 2 5 mg tablet     heparin (porcine) subcutaneous injection 5,000 Units        Omar Mccoy, DO

## 2019-09-07 NOTE — PROGRESS NOTES
Progress Note - General Surgery   Rah Mchugh [de-identified] y o  male MRN: [id number]  Unit/Bed#: -02 Encounter: [id number]    Assessment:  Patient appears the more alert and resting comfortably after percutaneous cholecystostomy yesterday  There complaints of discomfort at the cholecystostomy site, and a poor appetite  VSS AF  The cholecystostomy is draining bile with sludge  No growth from the aspirated bile, although this quite early  White count is trending down after cholecystostomy tube placement  Plan:  Continue broad-spectrum antibiotics  Diet as tolerated    Subjective/Objective   Chief Complaint: Discomfort from cholecystostomy tube    Subjective: Appears more comfortable, but still quite frail    Objective:     Blood pressure 148/74, pulse 71, temperature 98 8 °F (37 1 °C), temperature source Tympanic, resp  rate 18, height 5' 8" (1 727 m), weight 71 6 kg (157 lb 13 6 oz), SpO2 97 %  ,Body mass index is 24 kg/m²  Intake/Output Summary (Last 24 hours) at 9/7/2019 1429  Last data filed at 9/7/2019 1427  Gross per 24 hour   Intake    Output 1980 ml   Net -1980 ml       Invasive Devices     Peripheral Intravenous Line            Peripheral IV 09/06/19 Right Forearm less than 1 day          Drain            Urethral Catheter Latex 16 Fr  70 days    Closed/Suction Drain Right RUQ Bulb 10 2 Fr  less than 1 day                Physical Exam:   Abdomen soft, flat with cholecystostomy in RUQ    Bowel sounds are present    Lab, Imaging and other studies:  CBC:   Lab Results   Component Value Date    WBC 14 40 (H) 09/07/2019    HGB 8 3 (L) 09/07/2019    HCT 24 7 (L) 09/07/2019    MCV 89 09/07/2019     09/07/2019    MCH 30 0 09/07/2019    MCHC 33 8 09/07/2019    RDW 15 0 (H) 09/07/2019    MPV 8 4 (L) 09/07/2019   , CMP:   Lab Results   Component Value Date    SODIUM 141 09/07/2019    K 3 7 09/07/2019     (H) 09/07/2019    CO2 19 (L) 09/07/2019    BUN 44 (H) 09/07/2019    CREATININE 2 83 (H) 09/07/2019    CALCIUM 8 7 09/07/2019    EGFR 20 09/07/2019     VTE Pharmacologic Prophylaxis:Per primary Service   VTE Mechanical Prophylaxis: Per primary service

## 2019-09-07 NOTE — ASSESSMENT & PLAN NOTE
· Suspected due to cholecystitis with marked leukocytosis this AM   · Continue with pip/sathya day #3  · procalcitonin slightly elevated- will trend, leukocytosis is improving  · US RUQ shows sludge, HIDA scan is indicative of cholecystitis   · Gallbladder fluid culture- pending   · Patient underwent a percutaneous cholecystostomy by IR 9/6  · Pain is improving   · Will continue pain control as needed  · CT scan of the abdomen/pelvis results reviewed

## 2019-09-07 NOTE — PLAN OF CARE
Problem: DISCHARGE PLANNING - CARE MANAGEMENT  Goal: Discharge to post-acute care or home with appropriate resources  Description  INTERVENTIONS:  - Conduct assessment to determine patient/family and health care team treatment goals, and need for post-acute services based on payer coverage, community resources, and patient preferences, and barriers to discharge  - Address psychosocial, clinical, and financial barriers to discharge as identified in assessment in conjunction with the patient/family and health care team  - Arrange appropriate level of post-acute services according to patient's   needs and preference and payer coverage in collaboration with the physician and health care team  - Communicate with and update the patient/family, physician, and health care team regarding progress on the discharge plan  - Arrange appropriate transportation to post-acute venues    Pt's goal is to return home with  and hhc, pt is active with [facility], pt needs to be assessed by pt dept   Outcome: Progressing

## 2019-09-08 PROBLEM — E11.22 TYPE 2 DIABETES MELLITUS WITH STAGE 3 CHRONIC KIDNEY DISEASE, WITH LONG-TERM CURRENT USE OF INSULIN (HCC): Chronic | Status: ACTIVE | Noted: 2019-01-23

## 2019-09-08 PROBLEM — N18.30 TYPE 2 DIABETES MELLITUS WITH STAGE 3 CHRONIC KIDNEY DISEASE, WITH LONG-TERM CURRENT USE OF INSULIN (HCC): Chronic | Status: ACTIVE | Noted: 2019-01-23

## 2019-09-08 LAB
ALBUMIN SERPL BCP-MCNC: 2.9 G/DL (ref 3.5–5.7)
ALP SERPL-CCNC: 81 U/L (ref 55–165)
ALT SERPL W P-5'-P-CCNC: 17 U/L (ref 7–52)
ANION GAP SERPL CALCULATED.3IONS-SCNC: 12 MMOL/L (ref 4–13)
AST SERPL W P-5'-P-CCNC: 10 U/L (ref 13–39)
BILIRUB SERPL-MCNC: 0.6 MG/DL (ref 0.2–1)
BUN SERPL-MCNC: 50 MG/DL (ref 7–25)
CALCIUM SERPL-MCNC: 8.6 MG/DL (ref 8.6–10.5)
CHLORIDE SERPL-SCNC: 109 MMOL/L (ref 98–107)
CO2 SERPL-SCNC: 17 MMOL/L (ref 21–31)
CREAT SERPL-MCNC: 2.93 MG/DL (ref 0.7–1.3)
ERYTHROCYTE [DISTWIDTH] IN BLOOD BY AUTOMATED COUNT: 15.3 % (ref 11.5–14.5)
GFR SERPL CREATININE-BSD FRML MDRD: 19 ML/MIN/1.73SQ M
GLUCOSE SERPL-MCNC: 201 MG/DL (ref 65–99)
GLUCOSE SERPL-MCNC: 204 MG/DL (ref 65–140)
GLUCOSE SERPL-MCNC: 220 MG/DL (ref 65–140)
GLUCOSE SERPL-MCNC: 269 MG/DL (ref 65–140)
GLUCOSE SERPL-MCNC: 318 MG/DL (ref 65–140)
HCT VFR BLD AUTO: 32.1 % (ref 42–47)
HGB BLD-MCNC: 10.8 G/DL (ref 14–18)
MCH RBC QN AUTO: 29.6 PG (ref 26–34)
MCHC RBC AUTO-ENTMCNC: 33.6 G/DL (ref 31–37)
MCV RBC AUTO: 88 FL (ref 81–99)
PLATELET # BLD AUTO: 208 THOUSANDS/UL (ref 149–390)
PMV BLD AUTO: 7.8 FL (ref 8.6–11.7)
POTASSIUM SERPL-SCNC: 3.6 MMOL/L (ref 3.5–5.5)
PROT SERPL-MCNC: 6.2 G/DL (ref 6.4–8.9)
RBC # BLD AUTO: 3.65 MILLION/UL (ref 4.3–5.9)
SODIUM SERPL-SCNC: 138 MMOL/L (ref 134–143)
WBC # BLD AUTO: 11.2 THOUSAND/UL (ref 4.8–10.8)

## 2019-09-08 PROCEDURE — 80053 COMPREHEN METABOLIC PANEL: CPT | Performed by: NURSE PRACTITIONER

## 2019-09-08 PROCEDURE — 99232 SBSQ HOSP IP/OBS MODERATE 35: CPT | Performed by: SPECIALIST

## 2019-09-08 PROCEDURE — 97163 PT EVAL HIGH COMPLEX 45 MIN: CPT

## 2019-09-08 PROCEDURE — 97167 OT EVAL HIGH COMPLEX 60 MIN: CPT

## 2019-09-08 PROCEDURE — G8987 SELF CARE CURRENT STATUS: HCPCS

## 2019-09-08 PROCEDURE — G8979 MOBILITY GOAL STATUS: HCPCS

## 2019-09-08 PROCEDURE — 99232 SBSQ HOSP IP/OBS MODERATE 35: CPT | Performed by: INTERNAL MEDICINE

## 2019-09-08 PROCEDURE — G8978 MOBILITY CURRENT STATUS: HCPCS

## 2019-09-08 PROCEDURE — 85027 COMPLETE CBC AUTOMATED: CPT | Performed by: NURSE PRACTITIONER

## 2019-09-08 PROCEDURE — G8988 SELF CARE GOAL STATUS: HCPCS

## 2019-09-08 PROCEDURE — 99232 SBSQ HOSP IP/OBS MODERATE 35: CPT | Performed by: NURSE PRACTITIONER

## 2019-09-08 PROCEDURE — 82948 REAGENT STRIP/BLOOD GLUCOSE: CPT

## 2019-09-08 RX ORDER — INSULIN GLARGINE 100 [IU]/ML
6 INJECTION, SOLUTION SUBCUTANEOUS
Status: DISCONTINUED | OUTPATIENT
Start: 2019-09-08 | End: 2019-09-08

## 2019-09-08 RX ORDER — HEPARIN SODIUM 5000 [USP'U]/ML
5000 INJECTION, SOLUTION INTRAVENOUS; SUBCUTANEOUS EVERY 8 HOURS SCHEDULED
Status: DISCONTINUED | OUTPATIENT
Start: 2019-09-08 | End: 2019-09-10 | Stop reason: HOSPADM

## 2019-09-08 RX ORDER — INSULIN GLARGINE 100 [IU]/ML
8 INJECTION, SOLUTION SUBCUTANEOUS
Status: DISCONTINUED | OUTPATIENT
Start: 2019-09-08 | End: 2019-09-10 | Stop reason: HOSPADM

## 2019-09-08 RX ORDER — LEVOFLOXACIN 250 MG/1
250 TABLET ORAL EVERY 24 HOURS
Status: DISCONTINUED | OUTPATIENT
Start: 2019-09-08 | End: 2019-09-10 | Stop reason: HOSPADM

## 2019-09-08 RX ADMIN — Medication 1 CAPSULE: at 17:03

## 2019-09-08 RX ADMIN — ASPIRIN 81 MG 81 MG: 81 TABLET ORAL at 08:06

## 2019-09-08 RX ADMIN — COLCHICINE 0.6 MG: 0.6 TABLET, FILM COATED ORAL at 08:06

## 2019-09-08 RX ADMIN — METHENAMINE HIPPURATE 1 G: 1 TABLET ORAL at 17:03

## 2019-09-08 RX ADMIN — INSULIN GLARGINE 8 UNITS: 100 INJECTION, SOLUTION SUBCUTANEOUS at 21:29

## 2019-09-08 RX ADMIN — TAMSULOSIN HYDROCHLORIDE 0.4 MG: 0.4 CAPSULE ORAL at 17:03

## 2019-09-08 RX ADMIN — AMLODIPINE BESYLATE 10 MG: 5 TABLET ORAL at 08:06

## 2019-09-08 RX ADMIN — PIPERACILLIN SODIUM AND TAZOBACTAM SODIUM 2.25 G: 2; .25 INJECTION, POWDER, LYOPHILIZED, FOR SOLUTION INTRAVENOUS at 04:00

## 2019-09-08 RX ADMIN — INSULIN LISPRO 3 UNITS: 100 INJECTION, SOLUTION INTRAVENOUS; SUBCUTANEOUS at 17:04

## 2019-09-08 RX ADMIN — VITAMIN D, TAB 1000IU (100/BT) 1000 UNITS: 25 TAB at 08:06

## 2019-09-08 RX ADMIN — HEPARIN SODIUM 5000 UNITS: 5000 INJECTION INTRAVENOUS; SUBCUTANEOUS at 21:29

## 2019-09-08 RX ADMIN — INSULIN LISPRO 2 UNITS: 100 INJECTION, SOLUTION INTRAVENOUS; SUBCUTANEOUS at 11:20

## 2019-09-08 RX ADMIN — LEVOFLOXACIN 250 MG: 250 TABLET, FILM COATED ORAL at 14:55

## 2019-09-08 RX ADMIN — OXYCODONE HYDROCHLORIDE AND ACETAMINOPHEN 1 TABLET: 5; 325 TABLET ORAL at 17:03

## 2019-09-08 RX ADMIN — INSULIN LISPRO 2 UNITS: 100 INJECTION, SOLUTION INTRAVENOUS; SUBCUTANEOUS at 08:05

## 2019-09-08 RX ADMIN — INSULIN LISPRO 1 UNITS: 100 INJECTION, SOLUTION INTRAVENOUS; SUBCUTANEOUS at 21:29

## 2019-09-08 RX ADMIN — PIPERACILLIN SODIUM AND TAZOBACTAM SODIUM 2.25 G: 2; .25 INJECTION, POWDER, LYOPHILIZED, FOR SOLUTION INTRAVENOUS at 11:13

## 2019-09-08 RX ADMIN — HEPARIN SODIUM 5000 UNITS: 5000 INJECTION INTRAVENOUS; SUBCUTANEOUS at 14:55

## 2019-09-08 RX ADMIN — METHENAMINE HIPPURATE 1 G: 1 TABLET ORAL at 08:06

## 2019-09-08 RX ADMIN — PANTOPRAZOLE SODIUM 40 MG: 40 TABLET, DELAYED RELEASE ORAL at 05:15

## 2019-09-08 NOTE — PLAN OF CARE
Problem: OCCUPATIONAL THERAPY ADULT  Goal: Performs self-care activities at highest level of function for planned discharge setting  See evaluation for individualized goals  Description  Treatment Interventions: ADL retraining, Functional transfer training, UE strengthening/ROM, Endurance training, Cognitive reorientation, Neuromuscular reeducation, Compensatory technique education          See flowsheet documentation for full assessment, interventions and recommendations  Note:   Limitation: Decreased ADL status, Decreased UE strength, Decreased cognition, Decreased endurance, Decreased self-care trans, Decreased high-level ADLs     Assessment: Pt is a [de-identified] y o  male who was admitted to [address] [place] [address] on 9/4/2019 with Generalized abdominal pain  At this time, patient is also affected by the comorbidities of: metabolic acidosis, anemia, HTN, neuroendocrine tumor, SILVERIO, DM and urinary retention  Additionally, patient is affected by the following personal factors:steps to enter environment, difficulty performing ADLS and difficulty performing IADLS   Orders placed for OT evaluation/treatment with up and OOB as tolerated orders  Prior to admission, Patient reporting being independent with ADLs, ambulatory with RW, and lives with friend in a one story house with 2 BETH  Upon OT evaluation, patient requires minimum assist and moderate assist for UB ADLs and moderate assist and maximum assist for LB ADLs  Occupational performance is affected by the following deficits: decreased strength, decreased balance, decreased tolerance, impaired memory, impaired sequencing and impaired problem solving  Based on the following information, patient would benefit from continued skilled OT treatment while in the hospital to address deficits and maximize level of functional independence with ADL's and functional mobility   Occupational Performance areas to address include: grooming, bathing/shower, toilet hygiene, dressing, functional mobility and clothing management  From OT standpoint, recommendation at time of d/c would be short term rehab       OT Discharge Recommendation: Short Term Rehab  OT - OK to Discharge: Yes(Once medically cleared)     Akira Chase, OT

## 2019-09-08 NOTE — ASSESSMENT & PLAN NOTE
Lab Results   Component Value Date    HGBA1C 8 1 (H) 06/14/2019       Recent Labs     09/07/19  1610 09/07/19 2011 09/08/19  0632 09/08/19  1119   POCGLU 274* 237* 220* 269*       Blood Sugar Average: Last 72 hrs:  (P) 258 [id number]   · Long acting insulin was held due to NPO/poor p o   Intake  · Will resume Lantus at this time as the patient is able to tolerate food  · Continue with ISS

## 2019-09-08 NOTE — PLAN OF CARE
Problem: Prexisting or High Potential for Compromised Skin Integrity  Goal: Skin integrity is maintained or improved  Description  INTERVENTIONS:  - Identify patients at risk for skin breakdown  - Assess and monitor skin integrity  - Assess and monitor nutrition and hydration status  - Monitor labs   - Assess for incontinence   - Turn and reposition patient  - Assist with mobility/ambulation  - Relieve pressure over bony prominences  - Avoid friction and shearing  - Provide appropriate hygiene as needed including keeping skin clean and dry  - Evaluate need for skin moisturizer/barrier cream  - Collaborate with interdisciplinary team   - Patient/family teaching  - Consider wound care consult   Outcome: Progressing     Problem: Potential for Falls  Goal: Patient will remain free of falls  Description  INTERVENTIONS:  - Assess patient frequently for physical needs  -  Identify cognitive and physical deficits and behaviors that affect risk of falls    -  [place]addy fall precautions as indicated by assessment   - Educate patient/family on patient safety including physical limitations  - Instruct patient to call for assistance with activity based on assessment  - Modify environment to reduce risk of injury  - Consider OT/PT consult to assist with strengthening/mobility  Outcome: Progressing     Problem: GASTROINTESTINAL - ADULT  Goal: Minimal or absence of nausea and/or vomiting  Description  INTERVENTIONS:  - Administer IV fluids if ordered to ensure adequate hydration  - Maintain NPO status until nausea and vomiting are resolved  - Nasogastric tube if ordered  - Administer ordered antiemetic medications as needed  - Provide nonpharmacologic comfort measures as appropriate  - Advance diet as tolerated, if ordered  - Consider nutrition services referral to assist patient with adequate nutrition and appropriate food choices  Outcome: Progressing  Goal: Maintains or returns to baseline bowel function  Description  INTERVENTIONS:  - Assess bowel function  - Encourage oral fluids to ensure adequate hydration  - Administer IV fluids if ordered to ensure adequate hydration  - Administer ordered medications as needed  - Encourage mobilization and activity  - Consider nutritional services referral to assist patient with adequate nutrition and appropriate food choices  Outcome: Progressing  Goal: Maintains adequate nutritional intake  Description  INTERVENTIONS:  - Monitor percentage of each meal consumed  - Identify factors contributing to decreased intake, treat as appropriate  - Assist with meals as needed  - Monitor I&O, weight, and lab values if indicated  - Obtain nutrition services referral as needed  Outcome: Progressing  Goal: Establish and maintain optimal ostomy function  Description  INTERVENTIONS:  - Assess bowel function  - Encourage oral fluids to ensure adequate hydration  - Administer IV fluids if ordered to ensure adequate hydration   - Administer ordered medications as needed  - Encourage mobilization and activity  - Nutrition services referral to assist patient with appropriate food choices  - Assess stoma site  - Consider wound care consult   Outcome: Progressing     Problem: GENITOURINARY - ADULT  Goal: Maintains or returns to baseline urinary function  Description  INTERVENTIONS:  - Assess urinary function  - Encourage oral fluids to ensure adequate hydration if ordered  - Administer IV fluids as ordered to ensure adequate hydration  - Administer ordered medications as needed  - Offer frequent toileting  - Follow urinary retention protocol if ordered  Outcome: Progressing  Goal: Absence of urinary retention  Description  INTERVENTIONS:  - Assess patients ability to void and empty bladder  - Monitor I/O  - Bladder scan as needed  - Discuss with physician/AP medications to alleviate retention as needed  - Discuss catheterization for long term situations as appropriate  Outcome: Progressing  Goal: Urinary catheter remains patent  Description  INTERVENTIONS:  - Assess patency of urinary catheter  - If patient has a chronic garcia, consider changing catheter if non-functioning  - Follow guidelines for intermittent irrigation of non-functioning urinary catheter  Outcome: Progressing     Problem: METABOLIC, FLUID AND ELECTROLYTES - ADULT  Goal: Electrolytes maintained within normal limits  Description  INTERVENTIONS:  - Monitor labs and assess patient for signs and symptoms of electrolyte imbalances  - Administer electrolyte replacement as ordered  - Monitor response to electrolyte replacements, including repeat lab results as appropriate  - Instruct patient on fluid and nutrition as appropriate  Outcome: Progressing  Goal: Fluid balance maintained  Description  INTERVENTIONS:  - Monitor labs   - Monitor I/O and WT  - Instruct patient on fluid and nutrition as appropriate  - Assess for signs & symptoms of volume excess or deficit  Outcome: Progressing  Goal: Glucose maintained within target range  Description  INTERVENTIONS:  - Monitor Blood Glucose as ordered  - Assess for signs and symptoms of hyperglycemia and hypoglycemia  - Administer ordered medications to maintain glucose within target range  - Assess nutritional intake and initiate nutrition service referral as needed  Outcome: Progressing     Problem: SKIN/TISSUE INTEGRITY - ADULT  Goal: Skin integrity remains intact  Description  INTERVENTIONS  - Identify patients at risk for skin breakdown  - Assess and monitor skin integrity  - Assess and monitor nutrition and hydration status  - Monitor labs (i e  albumin)  - Assess for incontinence   - Turn and reposition patient  - Assist with mobility/ambulation  - Relieve pressure over bony prominences  - Avoid friction and shearing  - Provide appropriate hygiene as needed including keeping skin clean and dry  - Evaluate need for skin moisturizer/barrier cream  - Collaborate with interdisciplinary team (i e  Nutrition, Rehabilitation, etc )   - Patient/family teaching  Outcome: Progressing  Goal: Incision(s), wounds(s) or drain site(s) healing without S/S of infection  Description  INTERVENTIONS  - Assess and document risk factors for skin impairment   - Assess and document dressing, incision, wound bed, drain sites and surrounding tissue  - Consider nutrition services referral as needed  - Oral mucous membranes remain intact  - Provide patient/ family education  Outcome: Progressing  Goal: Oral mucous membranes remain intact  Description  INTERVENTIONS  - Assess oral mucosa and hygiene practices  - Implement preventative oral hygiene regimen  - Implement oral medicated treatments as ordered  - Initiate Nutrition services referral as needed  Outcome: Progressing     Problem: PAIN - ADULT  Goal: Verbalizes/displays adequate comfort level or baseline comfort level  Description  Interventions:  - Encourage patient to monitor pain and request assistance  - Assess pain using appropriate pain scale  - Administer analgesics based on type and severity of pain and evaluate response  - Implement non-pharmacological measures as appropriate and evaluate response  - Consider cultural and social influences on pain and pain management  - Notify physician/advanced practitioner if interventions unsuccessful or patient reports new pain  Outcome: Progressing     Problem: INFECTION - ADULT  Goal: Absence or prevention of progression during hospitalization  Description  INTERVENTIONS:  - Assess and monitor for signs and symptoms of infection  - Monitor lab/diagnostic results  - Monitor all insertion sites, i e  indwelling lines, tubes, and drains  - Monitor endotracheal if appropriate and nasal secretions for changes in amount and color  - [place] appropriate cooling/warming therapies per order  - Administer medications as ordered  - Instruct and encourage patient and family to use good hand hygiene technique  - Identify and instruct in appropriate isolation precautions for identified infection/condition  Outcome: Progressing  Goal: Absence of fever/infection during neutropenic period  Description  INTERVENTIONS:  - Monitor WBC    Outcome: Progressing     Problem: SAFETY ADULT  Goal: Maintain or return to baseline ADL function  Description  INTERVENTIONS:  -  Assess patient's ability to carry out ADLs; assess patient's baseline for ADL function and identify physical deficits which impact ability to perform ADLs (bathing, care of mouth/teeth, toileting, grooming, dressing, etc )  - Assess/evaluate cause of self-care deficits   - Assess range of motion  - Assess patient's mobility; develop plan if impaired  - Assess patient's need for assistive devices and provide as appropriate  - Encourage maximum independence but intervene and supervise when necessary  - Involve family in performance of ADLs  - Assess for home care needs following discharge   - Consider OT consult to assist with ADL evaluation and planning for discharge  - Provide patient education as appropriate  Outcome: Progressing  Goal: Maintain or return mobility status to optimal level  Description  INTERVENTIONS:  - Assess patient's baseline mobility status (ambulation, transfers, stairs, etc )    - Identify cognitive and physical deficits and behaviors that affect mobility  - Identify mobility aids required to assist with transfers and/or ambulation (gait belt, sit-to-stand, lift, walker, cane, etc )  - [place] fall precautions as indicated by assessment  - Record patient progress and toleration of activity level on Mobility SBAR; progress patient to next Phase/Stage  - Instruct patient to call for assistance with activity based on assessment  - Consider rehabilitation consult to assist with strengthening/weightbearing, etc   Outcome: Progressing     Problem: DISCHARGE PLANNING  Goal: Discharge to home or other facility with appropriate resources  Description  INTERVENTIONS:  - Identify barriers to discharge w/patient and caregiver  - Arrange for needed discharge resources and transportation as appropriate  - Identify discharge learning needs (meds, wound care, etc )  - Arrange for interpretive services to assist at discharge as needed  - Refer to Case Management Department for coordinating discharge planning if the patient needs post-hospital services based on physician/advanced practitioner order or complex needs related to functional status, cognitive ability, or social support system  Outcome: Progressing     Problem: Knowledge Deficit  Goal: Patient/family/caregiver demonstrates understanding of disease process, treatment plan, medications, and discharge instructions  Description  Complete learning assessment and assess knowledge base    Interventions:  - Provide teaching at level of understanding  - Provide teaching via preferred learning methods  Outcome: Progressing     Problem: DISCHARGE PLANNING - CARE MANAGEMENT  Goal: Discharge to post-acute care or home with appropriate resources  Description  INTERVENTIONS:  - Conduct assessment to determine patient/family and health care team treatment goals, and need for post-acute services based on payer coverage, community resources, and patient preferences, and barriers to discharge  - Address psychosocial, clinical, and financial barriers to discharge as identified in assessment in conjunction with the patient/family and health care team  - Arrange appropriate level of post-acute services according to patient's   needs and preference and payer coverage in collaboration with the physician and health care team  - Communicate with and update the patient/family, physician, and health care team regarding progress on the discharge plan  - Arrange appropriate transportation to post-acute venues    Pt's goal is to return home with  and [facility], pt is active with [facility], pt needs to be assessed by pt dept   Outcome: Progressing     Problem: Nutrition/Hydration-ADULT  Goal: Nutrient/Hydration intake appropriate for improving, restoring or maintaining nutritional needs  Description  Monitor and assess patient's nutrition/hydration status for malnutrition  Collaborate with interdisciplinary team and initiate plan and interventions as ordered  Monitor patient's weight and dietary intake as ordered or per policy  Utilize nutrition screening tool and intervene as necessary  Determine patient's food preferences and provide high-protein, high-caloric foods as appropriate       INTERVENTIONS:  - Monitor oral intake, urinary output, labs, and treatment plans  - Assess nutrition and hydration status and recommend course of action  - Evaluate amount of meals eaten  - Assist patient with eating if necessary   - Allow adequate time for meals  - Recommend/ encourage appropriate diets, oral nutritional supplements, and vitamin/mineral supplements  - Order, calculate, and assess calorie counts as needed  - Recommend, monitor, and adjust tube feedings and TPN/PPN based on assessed needs  - Assess need for intravenous fluids  - Provide specific nutrition/hydration education as appropriate  - Include patient/family/caregiver in decisions related to nutrition  Outcome: Progressing

## 2019-09-08 NOTE — PROGRESS NOTES
Progress Note - Nephrology   Chantel Garcia [de-identified] y o  male MRN: [id number]  Unit/Bed#: -02 Encounter: [id number]    A/P:  1  Acute renal failure due to acute tubular necrosis             Creatinine slightly increased again this morning  Every is eating and drinking appropriately not believe we should be able to discontinue his IV fluids altogether  Continue supportive care and encouragement of nutritional intake  Will check urine studies to confirm there is no other issues at this time  2  Chronic kidney disease stage 3 with baseline creatinine likely around 1 4 - 1 5 mg/dL              As mentioned in prior notes, patient has not been able to achieve this prior baseline creatinine of 0 9 - 1 mg/dL for some time  ===========  3  Probable diabetic nephropathy  4  Proteinuria  5  Acidosis                BELNL bicarb remains low but otherwise appropriate at this time with no need to intervene  6  Neuroendocrine tumor               Continue follow-up with Dr Jane Guzman is continue with Lanreotide injections every 4 weeks for now   =============  7  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              Continue Haile catheter  8  Right hydroureter nephrosis              Continued observation per Urology recommendations  9  Abdominal pain with possible cholecystitis                patient is now status post cholecystostomy done on September 6th  As mentioned below, patient is not able to tolerate p o  Intake without severe abdominal pain  9/8:  Patient ultimately need to have a cholecystectomy, will defer to surgery when the most appropriate time for this may be  From the renal standpoint, at this time, patient is in acute tubular necrosis and agree with holding off for a surgery which at this time appears to be elective  If however it becomes absolutely necessary to have the gallbladder removed further medical benefit of the patient, then we should proceed    10  Iron deficiency anemia               resolved        Follow up reason for today's visit:  Acute kidney failure/chronic kidney disease    Generalized abdominal pain    Patient Active Problem List   Diagnosis    Weakness generalized    Type 2 diabetes mellitus with complication, with long-term current use of insulin ([address] )    Essential hypertension    Mixed hyperlipidemia    Cardiac disease    Generalized abdominal mass    Hydroureter    Metabolic acidosis    Iron deficiency anemia secondary to inadequate dietary iron intake    Syncope and collapse    Accelerated hypertension    Liver mass    Neuroendocrine tumor    Acute cystitis without hematuria    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)    Acute kidney injury superimposed on chronic kidney disease (HCC)    Acute pain of left knee    Pressure injury of right heel, unstageable (HCC)    Atherosclerosis of artery of extremity with ulceration (HCC)    Carcinoid syndrome (HCC)    Gram-negative bacteremia    Left hip pain    Acute cystitis with hematuria    Chronic indwelling Haile catheter    Moderate protein-calorie malnutrition (HCC)    Biliary sludge    Urinary tract infection due to extended-spectrum beta lactamase (ESBL) producing Escherichia coli    Malignant neuroendocrine neoplasm (HCC)    Generalized abdominal pain    Urinary retention         Subjective:   No acute events overnight, patient ate most of his breakfast after I saw him this morning    Objective:     Vitals: Blood pressure 161/69, pulse 80, temperature 97 6 °F (36 4 °C), temperature source Temporal, resp  rate 18, height 5' 8" (1 727 m), weight 73 kg (160 lb 15 oz), SpO2 96 %  ,Body mass index is 24 47 kg/m²      Weight (last 2 days)     Date/Time   Weight    09/08/19 0600   73 (160 94)    09/07/19 0600   71 6 (157 85)                Intake/Output Summary (Last 24 hours) at 9/8/2019 0844  Last data filed at 9/8/2019 0621  Gross per 24 hour   Intake 240 ml   Output 2700 ml   Net -2460 ml     I/O last 3 completed shifts: In: 240 [P O :240]  Out: 6079 [Urine:2275; Drains:1630]    Urethral Catheter Latex 16 Fr  (Active)   Reasons to continue Urinary Catheter  Chronic urinary catheter 9/7/2019  8:52 AM   Goal for Removal N/A- chronic garcia 9/7/2019  8:52 AM   Site Assessment Clean;Skin intact; Patent 9/7/2019  8:52 AM   Collection Container Standard drainage bag 9/7/2019  8:52 AM   Securement Method Securing device (Describe) 9/7/2019  8:52 AM   Output (mL) 1000 mL 9/8/2019  5:59 AM       Physical Exam: /69 (BP Location: Left arm)   Pulse 80   Temp 97 6 °F (36 4 °C) (Temporal)   Resp 18   Ht 5' 8" (1 727 m)   Wt 73 kg (160 lb 15 oz)   SpO2 96%   BMI 24 47 kg/m²     General Appearance:    Alert, cooperative, no distress, appears stated age   Head:    Normocephalic, without obvious abnormality, atraumatic   Eyes:    Conjunctiva/corneas clear   Ears:    Normal external ears   Nose:   Nares normal, septum midline, mucosa normal, no drainage    or sinus tenderness   Throat:   Lips, mucosa, and tongue normal; teeth and gums normal   Neck:   Supple   Back:     Symmetric, no curvature, ROM normal, no CVA tenderness   Lungs:     Clear to auscultation bilaterally, respirations unlabored   Chest wall:    No tenderness or deformity   Heart:    Regular rate and rhythm, S1 and S2 normal, no murmur, rub   or gallop   Abdomen:     Soft, bowel sounds active, mild generalized abdominal pain more so on the right upper quadrant and epigastric region   Extremities:   Extremities normal, atraumatic, no cyanosis or edema   Skin:   Skin color, texture, turgor normal, no rashes or lesions   Lymph nodes:   Cervical normal   Neurologic:   CNII-XII intact            Lab, Imaging and other studies: I have personally reviewed pertinent labs    CBC:   Lab Results   Component Value Date    WBC 11 20 (H) 09/08/2019    HGB 10 8 (L) 09/08/2019    HCT 32 1 (L) 09/08/2019    MCV 88 09/08/2019     09/08/2019    MCH 29 6 09/08/2019    MCHC 33 6 09/08/2019    RDW 15 3 (H) 09/08/2019    MPV 7 8 (L) 09/08/2019     CMP:   Lab Results   Component Value Date    K 3 6 09/08/2019     (H) 09/08/2019    CO2 17 (L) 09/08/2019    BUN 50 (H) 09/08/2019    CREATININE 2 93 (H) 09/08/2019    CALCIUM 8 6 09/08/2019    AST 10 (L) 09/08/2019    ALT 17 09/08/2019    ALKPHOS 81 09/08/2019    EGFR 19 09/08/2019         Results from last 7 days   Lab Units 09/08/19  0529 09/07/19  0503 09/06/19  0610  09/04/19  0410   POTASSIUM mmol/L 3 6 3 7 3 5   < > 4 0   CHLORIDE mmol/L 109* 112* 111*   < > 110*   CO2 mmol/L 17* 19* 17*   < > 12*   BUN mg/dL 50* 44* 32*   < > 44*   CREATININE mg/dL 2 93* 2 83* 2 22*   < > 2 36*   CALCIUM mg/dL 8 6 8 7 8 7   < > 8 6   ALK PHOS U/L 81  --  82  --  83   ALT U/L 17  --  13  --  7   AST U/L 10*  --  11*  --  8*    < > = values in this interval not displayed  Phosphorus: No results found for: PHOS  Magnesium: No results found for: MG  Urinalysis: No results found for: Susen Valladares, SPECGRAV, PHUR, LEUKOCYTESUR, NITRITE, PROTEINUA, GLUCOSEU, KETONESU, BILIRUBINUR, BLOODU  Ionized Calcium: No results found for: CAION  Coagulation: No results found for: PT, INR, APTT  Troponin: No results found for: TROPONINI  ABG: No results found for: PHART, OUW6JEU, PO2ART, GEM3VRO, R7KGXGTO, BEART, SOURCE  Radiology review:     IMAGING  Procedure: Nm Hepatobiliary    Result Date: 9/6/2019  Narrative: HEPATOBILIARY SCAN INDICATION: Abdominal pain and nausea, vomiting  COMPARISON:  Right upper quadrant ultrasound 9/4/2019 TECHNIQUE:   Following the intravenous administration of 5 2 mCi Tc-99m labeled mebrofenin, dynamic abdominal imaging was obtained in the anterior projection over a 60 minute time period  FINDINGS:  There is prompt, uniform accumulation with normal clearance of the radiopharmaceutical by the liver  There is normal filling of the intrahepatic ducts, common bile duct and bowel   At the end of 60 minutes the gallbladder did not visualize and therefore delayed imaging was obtained at both 2 and 4 hours  Again, the gallbladder was not seen     Impression: Nonvisualization of the gallbladder even on 4 hour delayed images  This finding is considered suspicious for cystic duct obstruction and in the appropriate clinical setting, cholecystitis  The examination demonstrates a significant  finding and was documented as such in [facility] for liaison and referring practitioner notification   Workstation performed: [id number]       Current Facility-Administered Medications   Medication Dose Route Frequency    acetaminophen (TYLENOL) tablet 650 mg  650 mg Oral Q6H PRN    amLODIPine (NORVASC) tablet 10 mg  10 mg Oral Daily    aspirin chewable tablet 81 mg  81 mg Oral Daily    b complex-vitamin C-folic acid (NEPHROCAPS) capsule 1 capsule  1 capsule Oral Daily With Dinner    cholecalciferol (VITAMIN D3) tablet 1,000 Units  1,000 Units Oral Daily    colchicine (COLCRYS) tablet 0 6 mg  0 6 mg Oral Daily    HYDROmorphone (DILAUDID) injection 0 5 mg  0 5 mg Intravenous Q3H PRN    insulin glargine (LANTUS) subcutaneous injection 6 Units 0 06 mL  6 Units Subcutaneous HS    insulin lispro (HumaLOG) 100 units/mL subcutaneous injection 1-5 Units  1-5 Units Subcutaneous TID AC    insulin lispro (HumaLOG) 100 units/mL subcutaneous injection 1-5 Units  1-5 Units Subcutaneous HS    methenamine hippurate (HIPREX) tablet 1 g  1 g Oral BID With Meals    ondansetron (ZOFRAN) injection 4 mg  4 mg Intravenous Q6H PRN    oxyCODONE-acetaminophen (PERCOCET) 5-325 mg per tablet 1 tablet  1 tablet Oral Q6H PRN    pantoprazole (PROTONIX) EC tablet 40 mg  40 mg Oral Early Morning    piperacillin-tazobactam (ZOSYN) 2 25 g in sodium chloride 0 9 % 50 mL IVPB  2 25 g Intravenous Q6H    sodium chloride 0 9 % infusion  75 mL/hr Intravenous Continuous    tamsulosin (FLOMAX) capsule 0 4 mg  0 4 mg Oral Daily With Prema Push Medications Discontinued During This Encounter   Medication Reason    HYDROmorphone (DILAUDID) injection 0 5 mg     sodium chloride 0 9 % infusion     amLODIPine (NORVASC) 2 5 mg tablet     heparin (porcine) subcutaneous injection 5,000 Units     piperacillin-tazobactam (ZOSYN) 3 375 g in sodium chloride 0 9 % 50 mL IVPB        Wilmer Thomason DO

## 2019-09-08 NOTE — OCCUPATIONAL THERAPY NOTE
Occupational Therapy Evaluation      Joya Garland    9/8/2019    Patient Active Problem List   Diagnosis    Weakness generalized    Type 2 diabetes mellitus with complication, with long-term current use of insulin ([facility] [address] )    Essential hypertension    Mixed hyperlipidemia    Cardiac disease    Generalized abdominal mass    Hydroureter    Metabolic acidosis    Iron deficiency anemia secondary to inadequate dietary iron intake    Syncope and collapse    Accelerated hypertension    Liver mass    Neuroendocrine tumor    Acute cystitis without hematuria    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)    Acute kidney injury superimposed on chronic kidney disease (HCC)    Acute pain of left knee    Pressure injury of right heel, unstageable ([facility] )    Atherosclerosis of artery of extremity with ulceration (HCC)    Carcinoid syndrome (HCC)    Gram-negative bacteremia    Left hip pain    Acute cystitis with hematuria    Chronic indwelling Haile catheter    Moderate protein-calorie malnutrition (HCC)    Biliary sludge    Urinary tract infection due to extended-spectrum beta lactamase (ESBL) producing Escherichia coli    Malignant neuroendocrine neoplasm (HCC)    Generalized abdominal pain    Urinary retention       Past Medical History:   Diagnosis Date    BPH (benign prostatic hyperplasia)     Cancer ([facility] )     liver cancer    Cardiac disease     Coronary artery disease     Diabetes mellitus ([facility] )     DJD (degenerative joint disease)     Gait disturbance     Generalized weakness     GERD (gastroesophageal reflux disease)     Gout     Hyperlipidemia     Hypertension     Renal disorder        Past Surgical History:   Procedure Laterality Date    CORONARY ANGIOPLASTY WITH STENT PLACEMENT      IR IMAGE GUIDED BIOPSY/ASPIRATION LIVER  1/24/2019 09/08/19 0846   Note Type   Note type Eval only   Restrictions/Precautions   Other Precautions Contact/isolation; Fall Risk;Multiple lines  (BONILLA drain)   Pain Assessment   Pain Assessment No/denies pain   Pain Score No Pain   Home Living   Type of [address] One level;Stairs to enter with rails; Performs ADLs on one level; Able to live on main level with bedroom/bathroom  (2 BETH)   Bathroom Shower/Tub Tub/shower unit   Bathroom Toilet Standard   Bathroom Equipment Shower chair   [address] [facility]; Wheelchair-manual   Additional Comments (-) driving, friend assists with transportation    Prior Function   Level of [place] Independent with ADLs and functional mobility   Lives With Significant other   Receives Help From Friend(s)   ADL Assistance Independent   IADLs Needs assistance   Falls in the last 6 months 0   Vocational Retired   Lifestyle   Autonomy Patient reporting being independent with ADLs, ambulatory with RW, and lives with friend in a one story house with 2 BETH   Reciprocal Relationships friends   ADL   Eating Assistance 5  Supervision/Setup   Grooming Assistance 5  Supervision/Setup   UB Bathing Assistance 4  Minimal Assistance   LB [address] 3  Moderate Assistance    [address] 3  Moderate Assistance    [facility] [address] 2  Maximal [address]  2  Maximal Assistance   Bed Mobility   Supine to Sit 3  Moderate assistance   Additional items Assist x 2;Verbal cues;LE management; Increased time required; Bedrails;HOB elevated   Additional Comments Pt OOB and seated in chair at end of eval    Transfers   Sit to Stand 3  Moderate assistance   Additional items Assist x 2; Increased time required;Verbal cues   Stand to Sit 3  Moderate assistance   Additional items Assist x 2;Verbal cues;Armrests;Trapeze bar   Stand pivot 3  Moderate assistance   Additional items Assist x 2; Increased time required;Verbal cues   Functional Mobility   Functional Mobility 3  Moderate assistance   Additional items Rolling walker   Balance   Static Sitting Fair Dynamic Sitting Fair -   Static Standing Poor +   Dynamic Standing Poor   Activity Tolerance   Activity Tolerance Patient limited by fatigue   Nurse Made Aware RN made aware of outcomes   RUE Assessment   RUE Assessment WFL  (3+/5)   LUE Assessment   LUE Assessment WFL  (3+/5)   Hand Function   Gross Motor Coordination Functional   Fine Motor Coordination Functional   Sensation   Light Touch No apparent deficits  (per pt )   Vision - Complex Assessment   Acuity Able to read clock/calendar on wall without difficulty   Cognition   Overall Cognitive Status Impaired   Arousal/Participation Alert; Responsive; Cooperative   Attention Within functional limits   Orientation Level Oriented X4   Memory Decreased short term memory   Following Commands Follows one step commands inconsistently   Comments Mini-Cog assessment performed today  Version 1 was given  Patient able to recall 1/3 words  Patient able to draw a normal clock with the correct time  Total score of test was 3/5 indicating  further screening of cognition is recommended  Cognition Assessment Tools   (mini-cog )   Score 3   Assessment   Limitation Decreased ADL status; Decreased UE strength;Decreased cognition;Decreased endurance;Decreased self-care trans;Decreased high-level ADLs   Assessment Pt is a [de-identified] y o  male who was admitted to [address] [place] [address] on 9/4/2019 with Generalized abdominal pain  At this time, patient is also affected by the comorbidities of: metabolic acidosis, anemia, HTN, neuroendocrine tumor, SILVERIO, DM and urinary retention  Additionally, patient is affected by the following personal factors:steps to enter environment, difficulty performing ADLS and difficulty performing IADLS   Orders placed for OT evaluation/treatment with up and OOB as tolerated orders  Prior to admission, Patient reporting being independent with ADLs, ambulatory with RW, and lives with friend in a one story house with 2 BETH   Upon OT evaluation, patient requires minimum assist and moderate assist for UB ADLs and moderate assist and maximum assist for LB ADLs  Occupational performance is affected by the following deficits: decreased strength, decreased balance, decreased tolerance, impaired memory, impaired sequencing and impaired problem solving  Based on the following information, patient would benefit from continued skilled OT treatment while in the hospital to address deficits and maximize level of functional independence with ADL's and functional mobility  Occupational Performance areas to address include: grooming, bathing/shower, toilet hygiene, dressing, functional mobility and clothing management  From OT standpoint, recommendation at time of d/c would be short term rehab  Goals   Patient Goals to go home   Plan   Treatment Interventions ADL retraining;Functional transfer training;UE strengthening/ROM; Endurance training;Cognitive reorientation; Neuromuscular reeducation; Compensatory technique education   Goal Expiration Date 09/18/19   Treatment Day 0   OT Frequency 3-5x/wk   Recommendation   OT Discharge Recommendation Short Term Rehab   OT - OK to Discharge Yes  (Once medically cleared)   Barthel Index   Feeding 5   Bathing 0   Grooming Score 0   Dressing Score 5   Bladder Score 0   Bowels Score 5   Toilet Use Score 5   Transfers (Bed/Chair) Score 5   Mobility (Level Surface) Score 0   Stairs Score 0   Barthel Index Score 25     GOALS:    Pt will achieve the following within specified time frame: STG  Pt will achieve the following goals within 5 days    *ADL transfers with Min (A) for inc'd independence with ADLs/purposeful tasks    *UB ADL with Min (A) for inc'd independence with self cares    *LB ADL with Min (A) using AE prn for inc'd independence with self cares    *Toileting with Min (A) for clothing management and hygiene for return to PLOF with personal care    *Increase static stand balance to fair and dyn stand balance to fair for inc'd safety with standing purposeful tasks    *Increase stand tolerance x5 m for inc'd tolerance with standing purposeful tasks    *Participate in 10m UE therex to increase overall stamina/activity tolerance for purposeful tasks    *Bed mobility- Min (A) for inc'd independence to manage own comfort and initiate EOB & OOB purposeful tasks      Pt will achieve the following within specified time frame: LTG  Pt will achieve the following goals within 10 days    *ADL transfers with (S) for inc'd independence with ADLs/purposeful tasks    *Self Feeding- (I) for inc'd independence with providing self nourishment    *UB ADL with (I) for inc'd independence with self cares    *LB ADL with (S) using AE prn for inc'd independence with self cares    *Toileting with (S) for clothing management and hygiene for return to PLOF with personal care    *Increase static stand balance to good and dyn stand balance to fair+ for inc'd safety with standing purposeful tasks    *Increase stand tolerance x10 m for inc'd tolerance with standing purposeful tasks    *Bed mobility- (I) for inc'd independence to manage own comfort and initiate EOB & OOB purposeful tasks    *Patient will increase OOB/sitting tolerance to 2-4 hours per day to increase participation in self-care and leisure tasks with no s/s of exertion           Sen Fontanez MS, OTR/L

## 2019-09-08 NOTE — PHYSICAL THERAPY NOTE
Physical Therapy Evaluation     Patient's Name: Lio Nguyen    Admitting Diagnosis  Vomiting [B59 58]  Metabolic acidosis [I17 1]  Hypertension [I10]  Pain of upper abdomen [R10 10]  Acute kidney injury superimposed on chronic kidney disease ([facility] ) [N17 9, N18 9]    Problem List  Patient Active Problem List   Diagnosis    Weakness generalized    Type 2 diabetes mellitus with complication, with long-term current use of insulin (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)    Acute kidney injury superimposed on chronic kidney disease (HCC)    Acute pain of left knee    Pressure injury of right heel, unstageable ([address] )    Atherosclerosis of artery of extremity with ulceration (HCC)    Carcinoid syndrome (HCC)    Gram-negative bacteremia    Left hip pain    Acute cystitis with hematuria    Chronic indwelling Haile catheter    Moderate protein-calorie malnutrition (HCC)    Biliary sludge    Urinary tract infection due to extended-spectrum beta lactamase (ESBL) producing Escherichia coli    Malignant neuroendocrine neoplasm (HCC)    Generalized abdominal pain    Urinary retention       Past Medical History  Past Medical History:   Diagnosis Date    BPH (benign prostatic hyperplasia)     Cancer ([facility] [address] )     liver cancer    Cardiac disease     Coronary artery disease     Diabetes mellitus ([address] )     DJD (degenerative joint disease)     Gait disturbance     Generalized weakness     GERD (gastroesophageal reflux disease)     Gout     Hyperlipidemia     Hypertension     Renal disorder        Past Surgical History  Past Surgical History: Procedure Laterality Date    CORONARY ANGIOPLASTY WITH STENT PLACEMENT      IR IMAGE GUIDED BIOPSY/ASPIRATION LIVER  1/24/2019 09/08/19 0824   Note Type   Note type Eval only   Pain Assessment   Pain Assessment No/denies pain   Home Living   Type of [address] One level;Stairs to enter with rails  (2 BETH)   Bathroom Shower/Tub Tub/shower unit   Bathroom Toilet Standard   Bathroom Equipment Shower chair   Home Equipment Walker; Wheelchair-manual   Prior Function   Level of [place] Independent with ADLs and functional mobility   Lives With Significant other   Receives Help From Friend(s)   ADL Assistance Independent   IADLs Needs assistance   Falls in the last 6 months 0   Vocational Retired   Comments -    Restrictions/Precautions   Other Precautions Contact/isolation; Fall Risk;Multiple lines  (BONILLA drain)   General   Family/Caregiver Present No   Cognition   Overall Cognitive Status Impaired   Arousal/Participation Responsive   Memory Decreased short term memory   Following Commands Follows one step commands inconsistently   RLE Assessment   RLE Assessment X   Strength RLE   RLE Overall Strength 3+/5   LLE Assessment   LLE Assessment X   Strength LLE   LLE Overall Strength 3+/5   Coordination   Sensation WFL   Bed Mobility   Supine to Sit 3  Moderate assistance   Additional items Assist x 2;Verbal cues;LE management; Increased time required   Transfers   Sit to Stand 3  Moderate assistance   Additional items Assist x 2;Verbal cues   Stand to Sit 3  Moderate assistance   Additional items Assist x 2;Verbal cues;Armrests   Ambulation/Elevation   Gait pattern Excessively slow; Short stride; Foward flexed; Shuffling;Decreased foot clearance; Improper Weight shift   Gait Assistance 4  Minimal assist   Additional items Assist x 2;Verbal cues   Assistive Device Rolling walker   Distance 5 ft   Balance   Static Sitting Fair   Dynamic Sitting Fair -   Static Standing Poor +   Dynamic Standing Poor Ambulatory Poor   Endurance Deficit   Endurance Deficit Yes   Endurance Deficit Description multiple rest breaks   Activity Tolerance   Activity Tolerance Patient limited by fatigue   Assessment   Prognosis Fair   Problem List Decreased strength;Decreased endurance; Impaired balance;Decreased mobility; Decreased safety awareness   Assessment Pt is [de-identified] y o  male seen for PT evaluation s/p admit to [facility] on 9/4/2019 w/ Generalized abdominal pain  PT consulted to assess pt's functional mobility and d/c needs  Order placed for PT eval and tx, w/ up as tolerated order  Comorbidities affecting pt's physical performance at time of assessment include: SILVERIO, neuroendocrine tumor, and DJD  PTA, pt was ambulates household distances, has 2 BETH, lives w/ girlfriend in 1 level house and retired  Personal factors affecting pt at time of IE include: ambulating w/ assistive device, stairs to enter home and inability to ambulate household distances  Please find objective findings from PT assessment regarding body systems outlined above with impairments and limitations including weakness, impaired balance, decreased endurance, gait deviations, decreased activity tolerance, decreased functional mobility tolerance, decreased safety awareness and fall risk  The following objective measures performed on IE also reveal limitations: Barthel Index: 20/100  Pt's clinical presentation is currently unstable/unpredictable seen in pt's presentation of recent surgery and abnormal labs  Pt to benefit from continued PT tx to address deficits as defined above and maximize level of functional independent mobility and consistency  From PT/mobility standpoint, recommendation at time of d/c would be STR pending progress in order to facilitate return to PLOF     Barriers to Discharge Inaccessible home environment   Goals   Patient Goals to get into the chair   LTG Expiration Date 09/15/19   Long Term Goal #1 Pt will: Perform bed mobility tasks to Supervision to improve ease of bed mobility  Perform transfers to Supervision to improve ease of transfers  Perform ambulation with RW and Supervision for 150 ft to     increase Indep in home environment  Increase BLE strength to 4/5 to improve functional mobility  Increase dynamic standing balance to F to decrease fall risk  Increase OOB activity tolerance to 10 minutes to decrease fall risk  Navigate up and down 2 steps with Supervision so pt can enter and exit home   Treatment Day 0   Plan   Treatment/Interventions Functional transfer training;LE strengthening/ROM; Elevations; Therapeutic exercise; Endurance training;Bed mobility; Compensatory technique education   PT Frequency   (3-5x/wk)   Recommendation   Recommendation Short-term skilled PT   Equipment Recommended Walker   PT - OK to Discharge Yes  (to STR)   Additional Comments pt refused SCDs due to ankle pain   Barthel Index   Feeding 5   Bathing 0   Grooming Score 0   Dressing Score 5   Bladder Score 0   Bowels Score 0   Toilet Use Score 5   Transfers (Bed/Chair) Score 5   Mobility (Level Surface) Score 0   Stairs Score 0   Barthel Index Score 20           Sana Andrews, PT

## 2019-09-08 NOTE — PROGRESS NOTES
Progress Note - General Surgery   Berta Suraez [de-identified] y o  male MRN: [id number]  Unit/Bed#: -02 Encounter: [id number]    Assessment:  Patient resting quietly, watching TV with   States he feels better today  VSS AF  WBC 11 2  Intraoperative cultures, no growth to date  Cholecystostomy draining bile and sludge    Plan:  D/C Zosyn  Transition to PO Levaquin  Maintain Cholecystostomy to bulb suction    Subjective/Objective   Chief Complaint: "I feel good"    Subjective: Appears frail and weak    Objective:     Blood pressure 161/69, pulse 80, temperature 97 6 °F (36 4 °C), temperature source Temporal, resp  rate 18, height 5' 8" (1 727 m), weight 73 kg (160 lb 15 oz), SpO2 96 %  ,Body mass index is 24 47 kg/m²  Intake/Output Summary (Last 24 hours) at 9/8/2019 1309  Last data filed at 9/8/2019 1113  Gross per 24 hour   Intake 240 ml   Output 3290 ml   Net -3050 ml       Invasive Devices     Peripheral Intravenous Line            Peripheral IV 09/06/19 Right Forearm 1 day          Drain            Urethral Catheter Latex 16 Fr  71 days    Closed/Suction Drain Right RUQ Bulb 10 2 Fr  1 day                Physical Exam:   Abdomen soft  Dressing intact without drainage    Lab, Imaging and other studies:  I have personally reviewed pertinent lab results    , CBC:   Lab Results   Component Value Date    WBC 11 20 (H) 09/08/2019    HGB 10 8 (L) 09/08/2019    HCT 32 1 (L) 09/08/2019    MCV 88 09/08/2019     09/08/2019    MCH 29 6 09/08/2019    MCHC 33 6 09/08/2019    RDW 15 3 (H) 09/08/2019    MPV 7 8 (L) 09/08/2019   , CMP:   Lab Results   Component Value Date    SODIUM 138 09/08/2019    K 3 6 09/08/2019     (H) 09/08/2019    CO2 17 (L) 09/08/2019    BUN 50 (H) 09/08/2019    CREATININE 2 93 (H) 09/08/2019    CALCIUM 8 6 09/08/2019    AST 10 (L) 09/08/2019    ALT 17 09/08/2019    ALKPHOS 81 09/08/2019    EGFR 19 09/08/2019     VTE Pharmacologic Prophylaxis: Heparin  VTE Mechanical Prophylaxis: sequential compression device

## 2019-09-08 NOTE — ASSESSMENT & PLAN NOTE
· With metastatic disease   · Patient currently on monthly chemo injections  · Continue outpatient follow-up with Dr Claudell Sofia   · Continue supportive measures

## 2019-09-08 NOTE — ASSESSMENT & PLAN NOTE
· Secondary to cholecystitis with marked leukocytosis   · US RUQ shows sludge, HIDA scan is indicative of cholecystitis   · Patient underwent a percutaneous cholecystostomy by IR 9/6  · Will need a cholecystectomy when medically stable/renal function improves  · Continue with pip/sathya day #4  · procalcitonin slightly elevated- will trend, leukocytosis is improving     · Pending operative culture  · Pain is improving   · Will continue pain control as needed  · Tolerates diet

## 2019-09-08 NOTE — PLAN OF CARE
Problem: PHYSICAL THERAPY ADULT  Goal: Performs mobility at highest level of function for planned discharge setting  See evaluation for individualized goals  Description  Treatment/Interventions: Functional transfer training, LE strengthening/ROM, Elevations, Therapeutic exercise, Endurance training, Bed mobility, Compensatory technique education  Equipment Recommended: Gloria Dillard       See flowsheet documentation for full assessment, interventions and recommendations  Outcome: Progressing  Note:   Prognosis: Fair  Problem List: Decreased strength, Decreased endurance, Impaired balance, Decreased mobility, Decreased safety awareness  Assessment: Pt is [de-identified] y o  male seen for PT evaluation s/p admit to [address] [place] [address] on 9/4/2019 w/ Generalized abdominal pain  PT consulted to assess pt's functional mobility and d/c needs  Order placed for PT eval and tx, w/ up as tolerated order  Comorbidities affecting pt's physical performance at time of assessment include: SILVERIO, neuroendocrine tumor, and DJD  PTA, pt was ambulates household distances, has 2 BETH, lives w/ girlfriend in 1 level house and retired  Personal factors affecting pt at time of IE include: ambulating w/ assistive device, stairs to enter home and inability to ambulate household distances  Please find objective findings from PT assessment regarding body systems outlined above with impairments and limitations including weakness, impaired balance, decreased endurance, gait deviations, decreased activity tolerance, decreased functional mobility tolerance, decreased safety awareness and fall risk  The following objective measures performed on IE also reveal limitations: Barthel Index: 20/100  Pt's clinical presentation is currently unstable/unpredictable seen in pt's presentation of recent surgery and abnormal labs   Pt to benefit from continued PT tx to address deficits as defined above and maximize level of functional independent mobility and consistency  From PT/mobility standpoint, recommendation at time of d/c would be STR pending progress in order to facilitate return to PLOF  Barriers to Discharge: Inaccessible home environment     Recommendation: Short-term skilled PT     PT - OK to Discharge: Yes(to STR)    See flowsheet documentation for full assessment     Eliu Barry, PT

## 2019-09-08 NOTE — PROGRESS NOTES
Progress Note - Pam Tate 1940, [de-identified] y o  male MRN: [id number]    Unit/Bed#: -02 Encounter: [id number]    Primary Care Provider: Karolee Cheadle, DO   Date and time admitted to hospital: 9/4/2019  3:03 AM        * Generalized abdominal pain  Assessment & Plan  · Secondary to cholecystitis with marked leukocytosis   · US RUQ shows sludge, HIDA scan is indicative of cholecystitis   · Patient underwent a percutaneous cholecystostomy by IR 9/6  · Will need a cholecystectomy when medically stable/renal function improves  · Continue with pip/sathya day #4  · procalcitonin slightly elevated- will trend, leukocytosis is improving  · Pending operative culture  · Pain is improving   · Will continue pain control as needed  · Tolerates diet       Urinary retention  Assessment & Plan  · With obstructive uropathy and chronic garcia   · Urology input appreciated   · CT shows suspected right hydronephrosis  Right upper quadrant ultrasound shows normal right kidney  Hydronephrosis is resolved  · Continue supportive measures at this time no urological intervention needed    Acute kidney injury superimposed on chronic kidney disease ([address] )  Assessment & Plan  · With CKD 3 with baseline Cr 1 4-1 5 mg/dL   · CT scan of the abdomen/pelvis suggested new hydroureteronephrosis  · Patient with chronic urinary retention and chronic Garcia catheter  SILVERIO is due to ATN     · Will continue with tamsulosin  · IV fluids  · Monitor urine output  · Nephrology input appreciated  · Avoid nephrotoxins  · Monitor renal function closely    Neuroendocrine tumor  Assessment & Plan  · With metastatic disease   · Patient currently on monthly chemo injections  · Continue outpatient follow-up with Dr Marla Hernandez   · Continue supportive measures    Accelerated hypertension  Assessment & Plan  · Pain is contributing to this as well  · BP has improved from admission  · Will continue home meds  · Hydralazine as needed   · Monitor BP closely Iron deficiency anemia secondary to inadequate dietary iron intake  Assessment & Plan  · Likely multifactorial  · No signs of acute bleeding  · Will monitor H&H and transfuse as needed  · H&H stable     Metabolic acidosis  Assessment & Plan  · Possibly secondary to acute renal failure  · Continue IV fluids  · Nephrology input appreciated  · Monitor closely       VTE Pharmacologic Prophylaxis: Pharmacologic: Heparin    Patient Centered Rounds: I have performed bedside rounds with nursing staff today  Discussions with Specialists or Other Care Team Provider: nursing, renal   Education and Discussions with Family / Patient: patient and girlfriend     Current Length of Stay: 4 day(s)    Current Patient Status: Inpatient   Certification Statement: The patient will continue to require additional inpatient hospital stay due to abdominal pain     Discharge Plan: pending hospital course     Code Status: Level 1 - Full Code    Subjective:   Feeling somewhat better  Pain is better  Able to tolerate PO  Objective:     Vitals:   Temp (24hrs), Av 1 °F (36 7 °C), Min:97 6 °F (36 4 °C), Max:99 1 °F (37 3 °C)    Temp:  [97 6 °F (36 4 °C)-99 1 °F (37 3 °C)] 97 6 °F (36 4 °C)  HR:  [80-86] 80  Resp:  [17-18] 18  BP: (154-161)/(69-74) 161/69  SpO2:  [93 %-96 %] 96 %  Body mass index is 24 47 kg/m²  Input and Output Summary (last 24 hours): Intake/Output Summary (Last 24 hours) at 2019 1137  Last data filed at 2019 0709  Gross per 24 hour   Intake 240 ml   Output 2600 ml   Net -2360 ml       Physical Exam:     Physical Exam   Constitutional: He is oriented to person, place, and time  He appears well-developed  No distress  Frail and chronically ill appearing      HENT:   Head: Normocephalic and atraumatic  Mouth/Throat: Oropharynx is clear and moist    Eyes: Pupils are equal, round, and reactive to light  Conjunctivae and EOM are normal    Neck: Normal range of motion  Neck supple   No thyromegaly present  Cardiovascular: Normal rate, regular rhythm, normal heart sounds and intact distal pulses  Pulmonary/Chest: Effort normal and breath sounds normal  No respiratory distress  He has no wheezes  Abdominal: Soft  Bowel sounds are normal  He exhibits distension  There is tenderness (RUQ, percutaneous cholecystostomy tube in place with greenish bile with sludge drainage)  Musculoskeletal: Normal range of motion  He exhibits no edema or deformity  Neurological: He is alert and oriented to person, place, and time  He has normal reflexes  No cranial nerve deficit  Skin: Skin is warm and dry  No erythema  Psychiatric: He has a normal mood and affect  His behavior is normal  Thought content normal    Vitals reviewed  Additional Data:     Labs:    Results from last 7 days   Lab Units 09/08/19  0529 09/07/19  0503   WBC Thousand/uL 11 20* 14 40*   HEMOGLOBIN g/dL 10 8* 8 3*   HEMATOCRIT % 32 1* 24 7*   PLATELETS Thousands/uL 208 244   NEUTROS PCT %  --  85*   LYMPHS PCT %  --  8*   MONOS PCT %  --  7   EOS PCT %  --  0     Results from last 7 days   Lab Units 09/08/19  0529   POTASSIUM mmol/L 3 6   CHLORIDE mmol/L 109*   CO2 mmol/L 17*   BUN mg/dL 50*   CREATININE mg/dL 2 93*   CALCIUM mg/dL 8 6   ALK PHOS U/L 81   ALT U/L 17   AST U/L 10*         Results from last 7 days   Lab Units 09/08/19  1119 09/08/19  0632 09/07/19 2011 09/07/19  1610 09/07/19  1113 09/07/19  0631 09/06/19 2000 09/06/19  1632 09/06/19  1112 09/05/19  2250 09/05/19  1553 09/05/19  1128   POC GLUCOSE mg/dl 269* 220* 237* 274* 285* 222* 279* 255* 233* 252* 275* 318*           * I Have Reviewed All Lab Data Listed Above  * Additional Pertinent Lab Tests Reviewed:  Laura [facility] Admission  Reviewed    Imaging:  Imaging Reports Reviewed Today Include: n/a     Recent Cultures (last 7 days):     Results from last 7 days   Lab Units 09/06/19  1626   GRAM STAIN RESULT  No Polys or Bacteria seen   BODY FLUID CULTURE, STERILE  No growth       Last 24 Hours Medication List:     Current Facility-Administered Medications:  acetaminophen 650 mg Oral Q6H PRN Alma Maxwell PA-C    amLODIPine 10 mg Oral Daily Barnetta SerROBBY abdullahi    aspirin 81 mg Oral Daily Barnetta SerROBBY abdullahi    b complex-vitamin C-folic acid 1 capsule Oral Daily With Dinner Shanicea ROBBY Maxwell    cholecalciferol 1,000 Units Oral Daily Barnetta SerROBBY abdullahi    colchicine 0 6 mg Oral Daily Barnetta SerROBBY abdullahi    HYDROmorphone 0 5 mg Intravenous Q3H PRN Shanon Kehr, CRNP    insulin glargine 6 Units Subcutaneous HS Shanon Kehr, CRNP    insulin lispro 1-5 Units Subcutaneous TID AC Lubna Pena MD    insulin lispro 1-5 Units Subcutaneous HS Alma Maxwell PA-C    methenamine hippurate 1 g Oral BID With Meals Shanicea ROBBY Maxwell    ondansetron 4 mg Intravenous Q6H PRN Alma Maxwell PA-C    oxyCODONE-acetaminophen 1 tablet Oral Q6H PRN Shanon Kehr, CRNP    pantoprazole 40 mg Oral Early Morning Alma Maxwell PA-C    piperacillin-tazobactam 2 25 g Intravenous Q6H Shanon Kehr, CRNP Last Rate: 2 25 g (09/08/19 1113)   tamsulosin 0 4 mg Oral Daily With Kajal Silver PA-C         Today, Patient Was Seen By: Shanon Kehr, CRNP    ** Please Note: Dictation voice to text software may have been used in the creation of this document   **

## 2019-09-08 NOTE — ASSESSMENT & PLAN NOTE
· With CKD 3 with baseline Cr 1 4-1 5 mg/dL   · CT scan of the abdomen/pelvis suggested new hydroureteronephrosis  · Patient with chronic urinary retention and chronic Haile catheter  SILVERIO is due to ATN     · Will continue with tamsulosin  · IV fluids  · Monitor urine output  · Nephrology input appreciated  · Avoid nephrotoxins  · Monitor renal function closely

## 2019-09-08 NOTE — NURSING NOTE
Pt states he feels better, pain 'not bad at all'  Approximately 15-20 in ofelia drain currently  Hourly rounding reviewed  Call bell within reach

## 2019-09-09 DIAGNOSIS — Z71.89 COMPLEX CARE COORDINATION: Primary | ICD-10-CM

## 2019-09-09 LAB
ANION GAP SERPL CALCULATED.3IONS-SCNC: 11 MMOL/L (ref 4–13)
BACTERIA SPEC BFLD CULT: NO GROWTH
BUN SERPL-MCNC: 54 MG/DL (ref 7–25)
CALCIUM SERPL-MCNC: 8.3 MG/DL (ref 8.6–10.5)
CHLORIDE SERPL-SCNC: 107 MMOL/L (ref 98–107)
CO2 SERPL-SCNC: 18 MMOL/L (ref 21–31)
CREAT SERPL-MCNC: 3.12 MG/DL (ref 0.7–1.3)
CREAT UR-MCNC: 26.9 MG/DL
ERYTHROCYTE [DISTWIDTH] IN BLOOD BY AUTOMATED COUNT: 15 % (ref 11.5–14.5)
GFR SERPL CREATININE-BSD FRML MDRD: 18 ML/MIN/1.73SQ M
GLUCOSE SERPL-MCNC: 215 MG/DL (ref 65–140)
GLUCOSE SERPL-MCNC: 216 MG/DL (ref 65–99)
GLUCOSE SERPL-MCNC: 271 MG/DL (ref 65–140)
GLUCOSE SERPL-MCNC: 312 MG/DL (ref 65–140)
GLUCOSE SERPL-MCNC: 362 MG/DL (ref 65–140)
GRAM STN SPEC: NORMAL
HCT VFR BLD AUTO: 29.5 % (ref 42–47)
HGB BLD-MCNC: 10 G/DL (ref 14–18)
MCH RBC QN AUTO: 29.6 PG (ref 26–34)
MCHC RBC AUTO-ENTMCNC: 33.9 G/DL (ref 31–37)
MCV RBC AUTO: 87 FL (ref 81–99)
PLATELET # BLD AUTO: 215 THOUSANDS/UL (ref 149–390)
PMV BLD AUTO: 8.1 FL (ref 8.6–11.7)
POTASSIUM SERPL-SCNC: 3.8 MMOL/L (ref 3.5–5.5)
RBC # BLD AUTO: 3.38 MILLION/UL (ref 4.3–5.9)
SODIUM 24H UR-SCNC: 78 MOL/L
SODIUM SERPL-SCNC: 136 MMOL/L (ref 134–143)
WBC # BLD AUTO: 10.8 THOUSAND/UL (ref 4.8–10.8)

## 2019-09-09 PROCEDURE — 80048 BASIC METABOLIC PNL TOTAL CA: CPT | Performed by: NURSE PRACTITIONER

## 2019-09-09 PROCEDURE — 97116 GAIT TRAINING THERAPY: CPT

## 2019-09-09 PROCEDURE — 99232 SBSQ HOSP IP/OBS MODERATE 35: CPT | Performed by: SPECIALIST

## 2019-09-09 PROCEDURE — 99232 SBSQ HOSP IP/OBS MODERATE 35: CPT | Performed by: INTERNAL MEDICINE

## 2019-09-09 PROCEDURE — 82948 REAGENT STRIP/BLOOD GLUCOSE: CPT

## 2019-09-09 PROCEDURE — 85027 COMPLETE CBC AUTOMATED: CPT | Performed by: NURSE PRACTITIONER

## 2019-09-09 PROCEDURE — 99232 SBSQ HOSP IP/OBS MODERATE 35: CPT | Performed by: HOSPITALIST

## 2019-09-09 PROCEDURE — 82570 ASSAY OF URINE CREATININE: CPT | Performed by: INTERNAL MEDICINE

## 2019-09-09 PROCEDURE — 97110 THERAPEUTIC EXERCISES: CPT

## 2019-09-09 PROCEDURE — 84300 ASSAY OF URINE SODIUM: CPT | Performed by: INTERNAL MEDICINE

## 2019-09-09 RX ADMIN — VITAMIN D, TAB 1000IU (100/BT) 1000 UNITS: 25 TAB at 09:13

## 2019-09-09 RX ADMIN — LEVOFLOXACIN 250 MG: 250 TABLET, FILM COATED ORAL at 13:26

## 2019-09-09 RX ADMIN — AMLODIPINE BESYLATE 10 MG: 5 TABLET ORAL at 09:13

## 2019-09-09 RX ADMIN — HEPARIN SODIUM 5000 UNITS: 5000 INJECTION INTRAVENOUS; SUBCUTANEOUS at 05:26

## 2019-09-09 RX ADMIN — ASPIRIN 81 MG 81 MG: 81 TABLET ORAL at 09:13

## 2019-09-09 RX ADMIN — METHENAMINE HIPPURATE 1 G: 1 TABLET ORAL at 09:12

## 2019-09-09 RX ADMIN — PANTOPRAZOLE SODIUM 40 MG: 40 TABLET, DELAYED RELEASE ORAL at 05:26

## 2019-09-09 RX ADMIN — HEPARIN SODIUM 5000 UNITS: 5000 INJECTION INTRAVENOUS; SUBCUTANEOUS at 22:53

## 2019-09-09 RX ADMIN — HEPARIN SODIUM 5000 UNITS: 5000 INJECTION INTRAVENOUS; SUBCUTANEOUS at 13:26

## 2019-09-09 RX ADMIN — INSULIN LISPRO 3 UNITS: 100 INJECTION, SOLUTION INTRAVENOUS; SUBCUTANEOUS at 22:53

## 2019-09-09 RX ADMIN — INSULIN LISPRO 6 UNITS: 100 INJECTION, SOLUTION INTRAVENOUS; SUBCUTANEOUS at 16:50

## 2019-09-09 RX ADMIN — TAMSULOSIN HYDROCHLORIDE 0.4 MG: 0.4 CAPSULE ORAL at 16:50

## 2019-09-09 RX ADMIN — METHENAMINE HIPPURATE 1 G: 1 TABLET ORAL at 16:50

## 2019-09-09 RX ADMIN — OXYCODONE HYDROCHLORIDE AND ACETAMINOPHEN 1 TABLET: 5; 325 TABLET ORAL at 23:14

## 2019-09-09 RX ADMIN — OXYCODONE HYDROCHLORIDE AND ACETAMINOPHEN 1 TABLET: 5; 325 TABLET ORAL at 15:00

## 2019-09-09 RX ADMIN — INSULIN LISPRO 2 UNITS: 100 INJECTION, SOLUTION INTRAVENOUS; SUBCUTANEOUS at 07:35

## 2019-09-09 RX ADMIN — INSULIN GLARGINE 8 UNITS: 100 INJECTION, SOLUTION SUBCUTANEOUS at 22:53

## 2019-09-09 RX ADMIN — INSULIN LISPRO 3 UNITS: 100 INJECTION, SOLUTION INTRAVENOUS; SUBCUTANEOUS at 11:38

## 2019-09-09 RX ADMIN — Medication 1 CAPSULE: at 16:50

## 2019-09-09 RX ADMIN — COLCHICINE 0.6 MG: 0.6 TABLET, FILM COATED ORAL at 09:13

## 2019-09-09 NOTE — PROGRESS NOTES
Progress Note - Clark Leyden [de-identified] y o  male MRN: [id number]    Unit/Bed#: -02 Encounter: [id number]      Assessment:  Chronic urinary retention  Creatinine increased to 3 12 this morning  Now status post placement of percutaneous drainage tube into the gallbladder    Plan:  Continue Haile catheter  Continue to follow creatinine  If creatinine continues to rise, he may require repeat renal ultrasound to rule out recurrent hydronephrosis  His last ultrasound showed no hydronephrosis  Subjective: Tolerating Haile catheter  Denies any flank pain  Objective:     Vitals: Blood pressure 149/65, pulse 88, temperature 98 2 °F (36 8 °C), temperature source Tympanic, resp  rate 18, height 5' 8" (1 727 m), weight 75 4 kg (166 lb 3 6 oz), SpO2 98 %  ,Body mass index is 25 27 kg/m²  Intake/Output Summary (Last 24 hours) at 9/9/2019 1757  Last data filed at 9/9/2019 1705  Gross per 24 hour   Intake 1150 ml   Output 3800 ml   Net -2650 ml       Physical Exam: Male genitalia: normal   Haile draining clear yellow urine  Invasive Devices     Peripheral Intravenous Line            Peripheral IV 09/06/19 Right Forearm 3 days          Drain            Urethral Catheter Latex 16 Fr  72 days    Closed/Suction Drain Right RUQ Bulb 10 2 Fr  3 days                Lab, Imaging and other studies: I have personally reviewed pertinent reports      VTE Pharmacologic Prophylaxis: Sequential compression device (Venodyne)   VTE Mechanical Prophylaxis: sequential compression device

## 2019-09-09 NOTE — NURSING NOTE
Pt resting comfortably in bed, states pain is 'much better', rates it a 2/10, does not want pain meds  50cc green/bile drainage emptied from ofelia  Hourly rounding reviewed  Call bell within reach

## 2019-09-09 NOTE — ASSESSMENT & PLAN NOTE
· With obstructive uropathy and chronic garcia   · Urology input appreciated   · Right-sided hydronephrosis has resolved  · Continue present management

## 2019-09-09 NOTE — PLAN OF CARE
Problem: PHYSICAL THERAPY ADULT  Goal: Performs mobility at highest level of function for planned discharge setting  See evaluation for individualized goals  Description  Treatment/Interventions: Functional transfer training, LE strengthening/ROM, Elevations, Therapeutic exercise, Endurance training, Bed mobility, Compensatory technique education  Equipment Recommended: [place]       See flowsheet documentation for full assessment, interventions and recommendations  Outcome: Progressing  Note:   Prognosis: Fair  Problem List: Decreased strength, Decreased endurance, Impaired balance, Decreased mobility, Decreased safety awareness  Assessment: Pt seen for PT treatment session this date with interventions consisting of gait training w/ emphasis on improving pt's ability to ambulate level surfaces x 25 feet x 1 with min A provided by therapist with RW  Pt agreeable to PT treatment session upon arrival, pt found supine in bed w/ HOB elevated, in no apparent distress  In comparison to previous session, pt with improvements in amb distance  Post session: chair alarm engaged and all needs in reach seated in bedside chair with SCDs active  Continue to recommend STR at time of d/c in order to maximize pt's functional independence and safety w/ mobility  Pt continues to be functioning below baseline level, and remains limited 2* factors listed above and including decreased strength, endurance & safe functional mobility  PT will continue to see pt while here in order to address the deficits listed above and provide interventions consistent w/ POC in effort to achieve STGs  Barriers to Discharge: Inaccessible home environment     Recommendation: Short-term skilled PT     PT - OK to Discharge: Yes(when med cleared to STR)    See flowsheet documentation for full assessment

## 2019-09-09 NOTE — ASSESSMENT & PLAN NOTE
· With CKD 3 with baseline Cr 1 4-1 5 mg/dL   · Creatinine continues to worsen  · Possibly secondary to an acute tubular necrosis in the setting of recent Zosyn use  · Nephrology services are following  · Continue current management  · Once the creatinine plateaus and or starts getting better, discharge planning can commence

## 2019-09-09 NOTE — NURSING NOTE
No changes in the pts assess throughout the day, dsg falling off drain site, remove, site care done, new drain dsg applied, pt tolerated well, presently in bed in no acute distress watching tv, eating dinner and visiting with family, callbell in reach of the pt, cont to reposition q2

## 2019-09-09 NOTE — PROGRESS NOTES
Progress Note - Franco Carrel 1940, [de-identified] y o  male MRN: [id number]    Unit/Bed#: -02 Encounter: [id number]    Primary Care Provider: Terrence Rojas DO   Date and time admitted to hospital: 9/4/2019  3:03 AM        Generalized abdominal pain  Assessment & Plan  · Secondary to cholecystitis with marked leukocytosis   · US RUQ shows sludge, HIDA scan is indicative of cholecystitis   · Status post a percutaneous cholecystostomy on September 6, 2019  · Will eventually need a full cholecystectomy  · Status post Zosyn therapy  · Transition to p o  Levofloxacin which will continue to complete a 7 to ten-day course      * Acute kidney injury superimposed on chronic kidney disease ([facility] [address] )  Assessment & Plan  · With CKD 3 with baseline Cr 1 4-1 5 mg/dL   · Creatinine continues to worsen  · Possibly secondary to an acute tubular necrosis in the setting of recent Zosyn use  · Nephrology services are following  · Continue current management  · Once the creatinine plateaus and or starts getting better, discharge planning can commence    Urinary retention  Assessment & Plan  · With obstructive uropathy and chronic garcia   · Urology input appreciated   · Right-sided hydronephrosis has resolved  · Continue present management    Type 2 diabetes mellitus with stage 3 chronic kidney disease, with long-term current use of insulin [facility])  Assessment & Plan  Lab Results   Component Value Date    HGBA1C 8 1 (H) 06/14/2019       Recent Labs     09/07/19  1610 09/07/19 2011 09/08/19  0632 09/08/19  1119   POCGLU 274* 237* 220* 269*       Blood Sugar Average: Last 72 hrs:  (P) 258 [id number]   · Long acting insulin was held due to NPO/poor p o   Intake  · Will resume Lantus at this time as the patient is able to tolerate food  · Continue with ISS    Neuroendocrine tumor  Assessment & Plan  · With metastatic disease   · Patient currently on monthly chemo injections  · Continue outpatient follow-up with Dr Bishop Jameson · Continue supportive measures    Metabolic acidosis  Assessment & Plan  · Improved  · Possibly secondary to acute renal failure  · Continue IV fluids  · Nephrology input appreciated  · Monitor closely     Iron deficiency anemia secondary to inadequate dietary iron intake  Assessment & Plan  · Likely multifactorial - H&H is stable  · No signs of acute bleeding  · Will monitor H&H and transfuse as needed    Accelerated hypertension  Assessment & Plan  · Continue amlodipine  · Continue to monitor blood pressures closely        VTE Pharmacologic Prophylaxis: Pharmacologic: Heparin    Patient Centered Rounds: I have performed bedside rounds with nursing staff today  Discussions with Specialists or Other Care Team Provider:  Case management, nursing and pharmacy  Education and Discussions with Family / Patient:  Patient was brought up to par with the plan of care for today, all questions answered to his satisfaction    Current Length of Stay: 5 day(s)    Current Patient Status: Inpatient   Certification Statement: The patient will continue to require additional inpatient hospital stay due to The need for continued IV antibiotics as well as acute kidney injury resolution    Discharge Plan:  Discharge planning will commence once the patient is medically stable    Code Status: Level 1 - Full Code    Subjective:   Patient seen and examined  No new complaints, no distress  Denies pain or discomfort  Objective:     Vitals:   Temp (24hrs), Av 8 °F (36 6 °C), Min:97 4 °F (36 3 °C), Max:98 5 °F (36 9 °C)    Temp:  [97 4 °F (36 3 °C)-98 5 °F (36 9 °C)] 97 6 °F (36 4 °C)  HR:  [71-78] 78  Resp:  [16-18] 18  BP: (146-168)/(65-88) 168/88  SpO2:  [97 %] 97 %  Body mass index is 25 27 kg/m²  Input and Output Summary (last 24 hours):        Intake/Output Summary (Last 24 hours) at 2019 1036  Last data filed at 2019 0912  Gross per 24 hour   Intake 210 ml   Output 3570 ml   Net -3360 ml       Physical Exam: Physical Exam   Constitutional: He is oriented to person, place, and time  He appears well-developed and well-nourished  HENT:   Head: Normocephalic and atraumatic  Nose: Nose normal    Mouth/Throat: Oropharynx is clear and moist    Eyes: Pupils are equal, round, and reactive to light  Conjunctivae and EOM are normal    Neck: Normal range of motion  Neck supple  No JVD present  No thyromegaly present  Cardiovascular: Normal rate, regular rhythm and intact distal pulses  Exam reveals no gallop and no friction rub  No murmur heard  Pulmonary/Chest: Effort normal and breath sounds normal  No respiratory distress  Decreased breath sounds bilaterally at the bases   Abdominal: Soft  Bowel sounds are normal  He exhibits no distension and no mass  There is no tenderness  There is no guarding  Percutaneous cholecystostomy drain is in place   Musculoskeletal: Normal range of motion  He exhibits no edema  Lymphadenopathy:     He has no cervical adenopathy  Neurological: He is alert and oriented to person, place, and time  No cranial nerve deficit  Skin: Skin is warm  No rash noted  No erythema  Psychiatric: He has a normal mood and affect  His behavior is normal    Vitals reviewed  Additional Data:     Labs:    Results from last 7 days   Lab Units 09/09/19  0519  09/07/19  0503   WBC Thousand/uL 10 80   < > 14 40*   HEMOGLOBIN g/dL 10 0*   < > 8 3*   HEMATOCRIT % 29 5*   < > 24 7*   PLATELETS Thousands/uL 215   < > 244   NEUTROS PCT %  --   --  85*   LYMPHS PCT %  --   --  8*   MONOS PCT %  --   --  7   EOS PCT %  --   --  0    < > = values in this interval not displayed       Results from last 7 days   Lab Units 09/09/19  0519 09/08/19  0529   POTASSIUM mmol/L 3 8 3 6   CHLORIDE mmol/L 107 109*   CO2 mmol/L 18* 17*   BUN mg/dL 54* 50*   CREATININE mg/dL 3 12* 2 93*   CALCIUM mg/dL 8 3* 8 6   ALK PHOS U/L  --  81   ALT U/L  --  17   AST U/L  --  10*         Results from last 7 days   Lab Units 09/09/19  0614 09/08/19 2010 09/08/19  1607 09/08/19  1119 09/08/19  0632 09/07/19 2011 09/07/19  1610 09/07/19  1113 09/07/19  0631 09/06/19 2000 09/06/19  1632 09/06/19  1112   POC GLUCOSE mg/dl 215* 204* 318* 269* 220* 237* 274* 285* 222* 279* 255* 233*           * I Have Reviewed All Lab Data Listed Above  * Additional Pertinent Lab Tests Reviewed: Laura 65 Admission  Reviewed    Imaging:  Imaging Reports Reviewed Today Include:  None    Recent Cultures (last 7 days):     Results from last 7 days   Lab Units 09/06/19  1626   GRAM STAIN RESULT  No Polys or Bacteria seen   BODY FLUID CULTURE, STERILE  No growth       Last 24 Hours Medication List:     Current Facility-Administered Medications:  acetaminophen 650 mg Oral Q6H PRN Krypton Asa, PA-C   amLODIPine 10 mg Oral Daily Krypton Asa, PA-C   aspirin 81 mg Oral Daily Diego Asa, PA-C   b complex-vitamin C-folic acid 1 capsule Oral Daily With Dinner Diego Asa, PA-C   cholecalciferol 1,000 Units Oral Daily Krypton Asa, PA-C   colchicine 0 6 mg Oral Daily Diego Asa, PA-C   heparin (porcine) 5,000 Units Subcutaneous [facility] Andie Vázquez MD   HYDROmorphone 0 5 mg Intravenous Q3H PRN RIKA Yan   insulin glargine 8 Units Subcutaneous HS RIKA Yan   insulin lispro 1-5 Units Subcutaneous TID AC Casandra Garcia MD   insulin lispro 1-5 Units Subcutaneous HS Diego Asa, PA-C   levofloxacin 250 mg Oral Q24H Andie Vázquez MD   methenamine hippurate 1 g Oral BID With Meals Krypton Asa, PA-C   ondansetron 4 mg Intravenous Q6H PRN Krypton Asa, PA-C   oxyCODONE-acetaminophen 1 tablet Oral Q6H PRN RIKA Yan   pantoprazole 40 mg Oral Early Morning Krypton Asa, PA-C   tamsulosin 0 4 mg Oral Daily With Dinner Krypton Asa, PA-C        Today, Patient Was Seen By: Lee Corona MD    ** Please Note: Dictation voice to text software may have been used in the creation of this document  **

## 2019-09-09 NOTE — PLAN OF CARE
Problem: Prexisting or High Potential for Compromised Skin Integrity  Goal: Skin integrity is maintained or improved  Description  INTERVENTIONS:  - Identify patients at risk for skin breakdown  - Assess and monitor skin integrity  - Assess and monitor nutrition and hydration status  - Monitor labs   - Assess for incontinence   - Turn and reposition patient  - Assist with mobility/ambulation  - Relieve pressure over bony prominences  - Avoid friction and shearing  - Provide appropriate hygiene as needed including keeping skin clean and dry  - Evaluate need for skin moisturizer/barrier cream  - Collaborate with interdisciplinary team   - Patient/family teaching  - Consider wound care consult   Outcome: Progressing     Problem: Potential for Falls  Goal: Patient will remain free of falls  Description  INTERVENTIONS:  - Assess patient frequently for physical needs  -  Identify cognitive and physical deficits and behaviors that affect risk of falls    -  [place] fall precautions as indicated by assessment   - Educate patient/family on patient safety including physical limitations  - Instruct patient to call for assistance with activity based on assessment  - Modify environment to reduce risk of injury  - Consider OT/PT consult to assist with strengthening/mobility  Outcome: Progressing     Problem: GASTROINTESTINAL - ADULT  Goal: Minimal or absence of nausea and/or vomiting  Description  INTERVENTIONS:  - Administer IV fluids if ordered to ensure adequate hydration  - Maintain NPO status until nausea and vomiting are resolved  - Nasogastric tube if ordered  - Administer ordered antiemetic medications as needed  - Provide nonpharmacologic comfort measures as appropriate  - Advance diet as tolerated, if ordered  - Consider nutrition services referral to assist patient with adequate nutrition and appropriate food choices  Outcome: Progressing  Goal: Maintains or returns to baseline bowel function  Description  INTERVENTIONS:  - Assess bowel function  - Encourage oral fluids to ensure adequate hydration  - Administer IV fluids if ordered to ensure adequate hydration  - Administer ordered medications as needed  - Encourage mobilization and activity  - Consider nutritional services referral to assist patient with adequate nutrition and appropriate food choices  Outcome: Progressing  Goal: Maintains adequate nutritional intake  Description  INTERVENTIONS:  - Monitor percentage of each meal consumed  - Identify factors contributing to decreased intake, treat as appropriate  - Assist with meals as needed  - Monitor I&O, weight, and lab values if indicated  - Obtain nutrition services referral as needed  Outcome: Progressing  Goal: Establish and maintain optimal ostomy function  Description  INTERVENTIONS:  - Assess bowel function  - Encourage oral fluids to ensure adequate hydration  - Administer IV fluids if ordered to ensure adequate hydration   - Administer ordered medications as needed  - Encourage mobilization and activity  - Nutrition services referral to assist patient with appropriate food choices  - Assess stoma site  - Consider wound care consult   Outcome: Progressing     Problem: GENITOURINARY - ADULT  Goal: Maintains or returns to baseline urinary function  Description  INTERVENTIONS:  - Assess urinary function  - Encourage oral fluids to ensure adequate hydration if ordered  - Administer IV fluids as ordered to ensure adequate hydration  - Administer ordered medications as needed  - Offer frequent toileting  - Follow urinary retention protocol if ordered  Outcome: Progressing  Goal: Absence of urinary retention  Description  INTERVENTIONS:  - Assess patients ability to void and empty bladder  - Monitor I/O  - Bladder scan as needed  - Discuss with physician/AP medications to alleviate retention as needed  - Discuss catheterization for long term situations as appropriate  Outcome: Progressing  Goal: Urinary catheter remains patent  Description  INTERVENTIONS:  - Assess patency of urinary catheter  - If patient has a chronic garcia, consider changing catheter if non-functioning  - Follow guidelines for intermittent irrigation of non-functioning urinary catheter  Outcome: Progressing     Problem: METABOLIC, FLUID AND ELECTROLYTES - ADULT  Goal: Electrolytes maintained within normal limits  Description  INTERVENTIONS:  - Monitor labs and assess patient for signs and symptoms of electrolyte imbalances  - Administer electrolyte replacement as ordered  - Monitor response to electrolyte replacements, including repeat lab results as appropriate  - Instruct patient on fluid and nutrition as appropriate  Outcome: Progressing  Goal: Fluid balance maintained  Description  INTERVENTIONS:  - Monitor labs   - Monitor I/O and WT  - Instruct patient on fluid and nutrition as appropriate  - Assess for signs & symptoms of volume excess or deficit  Outcome: Progressing  Goal: Glucose maintained within target range  Description  INTERVENTIONS:  - Monitor Blood Glucose as ordered  - Assess for signs and symptoms of hyperglycemia and hypoglycemia  - Administer ordered medications to maintain glucose within target range  - Assess nutritional intake and initiate nutrition service referral as needed  Outcome: Progressing     Problem: SKIN/TISSUE INTEGRITY - ADULT  Goal: Skin integrity remains intact  Description  INTERVENTIONS  - Identify patients at risk for skin breakdown  - Assess and monitor skin integrity  - Assess and monitor nutrition and hydration status  - Monitor labs (i e  albumin)  - Assess for incontinence   - Turn and reposition patient  - Assist with mobility/ambulation  - Relieve pressure over bony prominences  - Avoid friction and shearing  - Provide appropriate hygiene as needed including keeping skin clean and dry  - Evaluate need for skin moisturizer/barrier cream  - Collaborate with interdisciplinary team (i e  Nutrition, Rehabilitation, etc )   - Patient/family teaching  Outcome: Progressing  Goal: Incision(s), wounds(s) or drain site(s) healing without S/S of infection  Description  INTERVENTIONS  - Assess and document risk factors for skin impairment   - Assess and document dressing, incision, wound bed, drain sites and surrounding tissue  - Consider nutrition services referral as needed  - Oral mucous membranes remain intact  - Provide patient/ family education  Outcome: Progressing  Goal: Oral mucous membranes remain intact  Description  INTERVENTIONS  - Assess oral mucosa and hygiene practices  - Implement preventative oral hygiene regimen  - Implement oral medicated treatments as ordered  - Initiate Nutrition services referral as needed  Outcome: Progressing     Problem: PAIN - ADULT  Goal: Verbalizes/displays adequate comfort level or baseline comfort level  Description  Interventions:  - Encourage patient to monitor pain and request assistance  - Assess pain using appropriate pain scale  - Administer analgesics based on type and severity of pain and evaluate response  - Implement non-pharmacological measures as appropriate and evaluate response  - Consider cultural and social influences on pain and pain management  - Notify physician/advanced practitioner if interventions unsuccessful or patient reports new pain  Outcome: Progressing     Problem: INFECTION - ADULT  Goal: Absence or prevention of progression during hospitalization  Description  INTERVENTIONS:  - Assess and monitor for signs and symptoms of infection  - Monitor lab/diagnostic results  - Monitor all insertion sites, i e  indwelling lines, tubes, and drains  - Monitor endotracheal if appropriate and nasal secretions for changes in amount and color  - Wisdom appropriate cooling/warming therapies per order  - Administer medications as ordered  - Instruct and encourage patient and family to use good hand hygiene technique  - Identify and instruct in appropriate isolation precautions for identified infection/condition  Outcome: Progressing  Goal: Absence of fever/infection during neutropenic period  Description  INTERVENTIONS:  - Monitor WBC    Outcome: Progressing     Problem: SAFETY ADULT  Goal: Maintain or return to baseline ADL function  Description  INTERVENTIONS:  -  Assess patient's ability to carry out ADLs; assess patient's baseline for ADL function and identify physical deficits which impact ability to perform ADLs (bathing, care of mouth/teeth, toileting, grooming, dressing, etc )  - Assess/evaluate cause of self-care deficits   - Assess range of motion  - Assess patient's mobility; develop plan if impaired  - Assess patient's need for assistive devices and provide as appropriate  - Encourage maximum independence but intervene and supervise when necessary  - Involve family in performance of ADLs  - Assess for home care needs following discharge   - Consider OT consult to assist with ADL evaluation and planning for discharge  - Provide patient education as appropriate  Outcome: Progressing  Goal: Maintain or return mobility status to optimal level  Description  INTERVENTIONS:  - Assess patient's baseline mobility status (ambulation, transfers, stairs, etc )    - Identify cognitive and physical deficits and behaviors that affect mobility  - Identify mobility aids required to assist with transfers and/or ambulation (gait belt, sit-to-stand, lift, walker, cane, etc )  - [place] fall precautions as indicated by assessment  - Record patient progress and toleration of activity level on Mobility SBAR; progress patient to next Phase/Stage  - Instruct patient to call for assistance with activity based on assessment  - Consider rehabilitation consult to assist with strengthening/weightbearing, etc   Outcome: Progressing     Problem: DISCHARGE PLANNING  Goal: Discharge to home or other facility with appropriate resources  Description  INTERVENTIONS:  - Identify barriers to discharge w/patient and caregiver  - Arrange for needed discharge resources and transportation as appropriate  - Identify discharge learning needs (meds, wound care, etc )  - Arrange for interpretive services to assist at discharge as needed  - Refer to Case Management Department for coordinating discharge planning if the patient needs post-hospital services based on physician/advanced practitioner order or complex needs related to functional status, cognitive ability, or social support system  Outcome: Progressing     Problem: Knowledge Deficit  Goal: Patient/family/caregiver demonstrates understanding of disease process, treatment plan, medications, and discharge instructions  Description  Complete learning assessment and assess knowledge base    Interventions:  - Provide teaching at level of understanding  - Provide teaching via preferred learning methods  Outcome: Progressing     Problem: DISCHARGE PLANNING - CARE MANAGEMENT  Goal: Discharge to post-acute care or home with appropriate resources  Description  INTERVENTIONS:  - Conduct assessment to determine patient/family and health care team treatment goals, and need for post-acute services based on payer coverage, community resources, and patient preferences, and barriers to discharge  - Address psychosocial, clinical, and financial barriers to discharge as identified in assessment in conjunction with the patient/family and health care team  - Arrange appropriate level of post-acute services according to patient's   needs and preference and payer coverage in collaboration with the physician and health care team  - Communicate with and update the patient/family, physician, and health care team regarding progress on the discharge plan  - Arrange appropriate transportation to post-acute venues    Pt's goal is to return home with  and [facility], pt is active with [facility], pt needs to be assessed by pt dept   Outcome: Progressing     Problem: Nutrition/Hydration-ADULT  Goal: Nutrient/Hydration intake appropriate for improving, restoring or maintaining nutritional needs  Description  Monitor and assess patient's nutrition/hydration status for malnutrition  Collaborate with interdisciplinary team and initiate plan and interventions as ordered  Monitor patient's weight and dietary intake as ordered or per policy  Utilize nutrition screening tool and intervene as necessary  Determine patient's food preferences and provide high-protein, high-caloric foods as appropriate       INTERVENTIONS:  - Monitor oral intake, urinary output, labs, and treatment plans  - Assess nutrition and hydration status and recommend course of action  - Evaluate amount of meals eaten  - Assist patient with eating if necessary   - Allow adequate time for meals  - Recommend/ encourage appropriate diets, oral nutritional supplements, and vitamin/mineral supplements  - Order, calculate, and assess calorie counts as needed  - Recommend, monitor, and adjust tube feedings and TPN/PPN based on assessed needs  - Assess need for intravenous fluids  - Provide specific nutrition/hydration education as appropriate  - Include patient/family/caregiver in decisions related to nutrition  Outcome: Progressing

## 2019-09-09 NOTE — UTILIZATION REVIEW
Continued Stay Review    Date: 9/9                          Current Patient Class: INPATIENT  Current Level of Care: med/surg    HPI:79 y o  male initially admitted as inpatient on 9/4 due to generalized abdominal pain, SILVERIO on CKD, accelerated HTN, metabolic acidosis  Assessment/Plan:   9/9 S/P placement of percutaneous cholecystostomy on 9/6 using sonographic and fluoroscopic guidance  Draining bile, no sludge  Maintain to bulb suction  Continue Levaquin  Reports discomfort at drain site  Will need full cholecystectomy  Acute renal failure due to ATN  Cr continues to slowly rise, today 3  12  Recheck urine studies  Will be cleared for surgery when Cr back to baseline      Pertinent Labs/Diagnostic Results:   Results from last 7 days   Lab Units 09/09/19  0519 09/08/19  0529 09/07/19  0503 09/06/19  0610 09/05/19  0558 09/04/19  0441   WBC Thousand/uL 10 80 11 20* 14 40* 17 50* 23 50* 10 80   HEMOGLOBIN g/dL 10 0* 10 8* 8 3* 10 6* 12 0* 10 7*   HEMATOCRIT % 29 5* 32 1* 24 7* 31 1* 35 9* 31 8*   PLATELETS Thousands/uL 215 208 244 201 237 161   NEUTROS ABS Thousands/µL  --   --  12 30* 14 90*  --   --    TOTAL NEUT ABS Thousand/uL  --   --   --   --   --  9 83*     Results from last 7 days   Lab Units 09/09/19  0519 09/08/19  0529 09/07/19  0503 09/06/19  0610 09/05/19  0558   SODIUM mmol/L 136 138 141 139 139   POTASSIUM mmol/L 3 8 3 6 3 7 3 5 3 7   CHLORIDE mmol/L 107 109* 112* 111* 109*   CO2 mmol/L 18* 17* 19* 17* 18*   ANION GAP mmol/L 11 12 10 11 12   BUN mg/dL 54* 50* 44* 32* 35*   CREATININE mg/dL 3 12* 2 93* 2 83* 2 22* 2 04*   EGFR ml/min/1 73sq m 18 19 20 27 30   CALCIUM mg/dL 8 3* 8 6 8 7 8 7 9 3     Results from last 7 days   Lab Units 09/08/19  0529 09/06/19  0610 09/04/19  0410 09/03/19  1104   AST U/L 10* 11* 8* 8   ALT U/L 17 13 7 15   ALK PHOS U/L 81 82 83 125*   TOTAL PROTEIN g/dL 6 2* 6 1* 6 9 7 9   ALBUMIN g/dL 2 9* 3 0* 3 7 3 6   TOTAL BILIRUBIN mg/dL 0 60 1 00 0 50 0 50     Results from last 7 days   Lab Units 09/09/19  1059 09/09/19  0614 09/08/19 2010 09/08/19  1607 09/08/19  1119 09/08/19  0632 09/07/19 2011 09/07/19  1610 09/07/19  1113 09/07/19  0631   POC GLUCOSE mg/dl 312* 215* 204* 318* 269* 220* 237* 274* 285* 222*     Results from last 7 days   Lab Units 09/09/19  0519 09/08/19  0529 09/07/19  0503 09/06/19  0610 09/05/19  0558 09/04/19  1706 09/04/19  0410 09/03/19  1104   GLUCOSE RANDOM mg/dL 216* 201* 234* 228* 267* 257* 219* 230*     Results from last 7 days   Lab Units 09/07/19  0503 09/06/19  0610   PROCALCITONIN ng/ml 1 54* 1 20*     Results from last 7 days   Lab Units 09/04/19  1224   LACTIC ACID mmol/L 0 7     Results from last 7 days   Lab Units 09/04/19  0410   LIPASE u/L 118*     Results from last 7 days   Lab Units 09/09/19  0923 09/04/19  1024   SODIUM UR  78 116   CREATININE UR mg/dL 26 9 14 1     Results from last 7 days   Lab Units 09/06/19  1626   GRAM STAIN RESULT  No Polys or Bacteria seen   BODY FLUID CULTURE, STERILE  No growth     9/6 HIDA scan:  Nonvisualization of the gallbladder even on 4 hour delayed images   This finding is considered suspicious for cystic duct obstruction and in the appropriate clinical setting, cholecystitis       Vital Signs:   09/09/19 0912    168/88     09/09/19 0727 97 6 °F (36 4 °C) 78 18 160/77 97 % None (Room air)   09/08/19 2321 97 4 °F (36 3 °C) 74 16 156/70 97 % None (Room air)       Medications:   Scheduled Meds:   Current Facility-Administered Medications:  acetaminophen 650 mg Oral Q6H PRN   amLODIPine 10 mg Oral Daily   aspirin 81 mg Oral Daily   b complex-vitamin C-folic acid 1 capsule Oral Daily With Dinner   cholecalciferol 1,000 Units Oral Daily   colchicine 0 6 mg Oral Daily   heparin (porcine) 5,000 Units Subcutaneous Q8H NATACHA   HYDROmorphone 0 5 mg Intravenous Q3H PRN   insulin glargine 8 Units Subcutaneous HS   insulin lispro 1-5 Units Subcutaneous TID AC   insulin lispro 1-5 Units Subcutaneous HS levofloxacin 250 mg Oral Q24H   methenamine hippurate 1 g Oral BID With Meals   ondansetron 4 mg Intravenous Q6H PRN   oxyCODONE-acetaminophen 1 tablet Oral Q6H PRN   pantoprazole 40 mg Oral Early Morning   tamsulosin 0 4 mg Oral Daily With Dinner       Discharge Plan: AARBELLA    Network Utilization Review Department  Phone: [phone]; Fax [phone]  [email or web address]  ATTENTION: Please call with any questions or concerns to [phone]  and carefully listen to the prompts so that you are directed to the right person  Send all requests for admission clinical reviews, approved or denied determinations and any other requests to fax [phone]   All voicemails are confidential

## 2019-09-09 NOTE — ASSESSMENT & PLAN NOTE
· Secondary to cholecystitis with marked leukocytosis   · US RUQ shows sludge, HIDA scan is indicative of cholecystitis   · Status post a percutaneous cholecystostomy on September 6, 2019  · Will eventually need a full cholecystectomy  · Status post Zosyn therapy  · Transition to p o   Levofloxacin which will continue to complete a 7 to ten-day course

## 2019-09-09 NOTE — OCCUPATIONAL THERAPY NOTE
09/09/19 0956   Restrictions/Precautions   Weight Bearing Precautions Per Order No   Other Precautions Contact/isolation; Chair Alarm; Fall Risk;Hard of hearing   Pain Assessment   Pain Assessment No/denies pain   ADL   Where Assessed   (patient reports "i'm already washed this morning" )   Therapeutic Excerise-Strength   UE Strength Yes   Right Upper Extremity- Strength   R Shoulder Flexion; Horizontal ABduction; Other (Comment)  (pro/re-traction)   R Elbow Elbow flexion;Elbow extension  (In forward And reverse;  Also: supin/pron-ation)   R Weight/Reps/Sets 1 pound - 30 reps each exer  Left Upper Extremity-Strength   L Weights/Reps/Sets All exers  Same as RUE above   Coordination   Gross Motor WFL   Dexterity WFL   Cognition   Overall Cognitive Status Impaired   Arousal/Participation Alert; Cooperative   Activity Tolerance   Activity Tolerance Patient limited by fatigue   Assessment   Assessment Patient participated in Skilled OT session this date with interventions consisting of therapeutic exercise to: increase functional use of BUEs, increase BUE muscle strength  toward increasing his functional status/ADL abilities  Patient agreeable to OT treatment session, upon arrival patient was found seated OOB to Chair  Patient requiring verbal cues for correct technique and frequent rest periods  Patient continues to be functioning below baseline level, occupational performance remains limited secondary to factors listed above and increased risk for falls and injury  From OT standpoint, recommendation at time of d/c would be STR vs Home Health Agency/ Home OT services pending progress (patient states "I was at home, doing ok, for 8-9 weeks")  Patient to benefit from continued Occupational Therapy treatment while in the hospital to address deficits as defined above and maximize level of functional independence with ADLs and functional mobility      Plan   Treatment Interventions UE strengthening/ROM   Goal Expiration Date 09/18/19   Treatment Day 1013 [facility], [place]/L

## 2019-09-09 NOTE — SOCIAL WORK
Discussed the POC with the rounding team and the pt SO   PT is recommending STR  Spoke to the pt and SO  So states pt is active with [facility] [address] and she is able to handle him at home  SO also reports she can drive him home upon discharge

## 2019-09-09 NOTE — PROGRESS NOTES
Progress Note - Nephrology   Ricci Cuellar [de-identified] y o  male MRN: [id number]  Unit/Bed#: -02 Encounter: [id number]    A/P:  1  Acute renal failure due to acute tubular necrosis             Creatinine continues to slowly rise, it is currently 3 12 mg/dL  Will reassess urine studies to confirm that he remains an acute tubular necrosis  Continue supportive care, and look and hope for a plateau phase with the ATN in the next 24-48 hours  Once the patient shows at least 1 day of improve creatinine he should be okay to be discharged from the renal standpoint  As mentioned prior if an elective surgical procedure is being contemplated, this can be done once the patient has achieved his baseline creatinine  An urgent surgical procedure did not concern about the renal function the simply fix the underlying surgical matter  Avoid nephrotoxins at this time  2  Chronic kidney disease stage 3 with baseline creatinine likely around 1 4 - 1 5 mg/dL              As mentioned in prior notes, patient has not been able to achieve this prior baseline creatinine of 0 9 - 1 mg/dL for some time  ===========  3  Probable diabetic nephropathy  4  Proteinuria  5  Acidosis                OFLFV bicarb okay, continue monitor no supplementation needed at this time  6  Neuroendocrine tumor               Continue follow-up with Dr Xie Cable is continue with Lanreotide injections every 4 weeks for now   =============  7  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              Continue Haile catheter  8  Right hydroureter nephrosis              Continued observation per Urology recommendations  9  Abdominal pain with possible cholecystitis                Today is day 3 of patient's cholecystostomy  There may be an obstructed cystic duct with possible procedure necessary in the future    10  Iron deficiency anemia               resolved    Generalized abdominal pain    Patient Active Problem List   Diagnosis  Weakness generalized    Type 2 diabetes mellitus with stage 3 chronic kidney disease, with long-term current use of insulin (HCC)    Essential hypertension    Mixed hyperlipidemia    Cardiac disease    Generalized abdominal mass    Hydroureter    Metabolic acidosis    Iron deficiency anemia secondary to inadequate dietary iron intake    Syncope and collapse    Accelerated hypertension    Liver mass    Neuroendocrine tumor    Acute cystitis without hematuria    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)    Acute kidney injury superimposed on chronic kidney disease (HCC)    Acute pain of left knee    Pressure injury of right heel, unstageable (HCC)    Atherosclerosis of artery of extremity with ulceration (HCC)    Carcinoid syndrome (HCC)    Gram-negative bacteremia    Left hip pain    Acute cystitis with hematuria    Chronic indwelling Haile catheter    Moderate protein-calorie malnutrition (HCC)    Biliary sludge    Urinary tract infection due to extended-spectrum beta lactamase (ESBL) producing Escherichia coli    Malignant neuroendocrine neoplasm (HCC)    Generalized abdominal pain    Urinary retention         Subjective:   Patient continues to have good appetite, some abdominal pain has significantly improved  Objective:     Vitals: Blood pressure 160/77, pulse 78, temperature 97 6 °F (36 4 °C), temperature source Tympanic, resp  rate 18, height 5' 8" (1 727 m), weight 75 4 kg (166 lb 3 6 oz), SpO2 97 %  ,Body mass index is 25 27 kg/m²  Weight (last 2 days)     Date/Time   Weight    09/09/19 0557   75 4 (166 23)    09/08/19 0600   73 (160 94)    09/07/19 0600   71 6 (157 85)                Intake/Output Summary (Last 24 hours) at 9/9/2019 0805  Last data filed at 9/9/2019 0625  Gross per 24 hour   Intake 210 ml   Output 3480 ml   Net -3270 ml     I/O last 3 completed shifts:   In: 210 [P O :210]  Out: 0760 [Urine:4650; Drains:320]    Urethral Catheter Latex 16 Fr  (Active)   Reasons to continue Urinary Catheter  Chronic urinary catheter 9/8/2019 11:13 AM   Goal for Removal N/A- chronic garcia 9/8/2019 11:13 AM   Site Assessment Clean;Skin intact; Patent 9/8/2019 11:13 AM   Collection Container Standard drainage bag 9/8/2019 11:13 AM   Securement Method Securing device (Describe) 9/8/2019 11:13 AM   Output (mL) 1200 mL 9/9/2019  5:55 AM       Physical Exam: /77 (BP Location: Left arm)   Pulse 78   Temp 97 6 °F (36 4 °C) (Tympanic)   Resp 18   Ht 5' 8" (1 727 m)   Wt 75 4 kg (166 lb 3 6 oz)   SpO2 97%   BMI 25 27 kg/m²     General Appearance:    Alert, cooperative, no distress, appears stated age   Head:    Normocephalic, without obvious abnormality, atraumatic   Eyes:    Conjunctiva/corneas clear   Ears:    Normal external ears   Nose:   Nares normal, septum midline, mucosa normal, no drainage    or sinus tenderness   Throat:   Lips, mucosa, and tongue normal; teeth and gums normal   Neck:   Supple   Back:     Symmetric, no curvature, ROM normal, no CVA tenderness   Lungs:     Clear to auscultation bilaterally, respirations unlabored   Chest wall:    No tenderness or deformity   Heart:    Regular rate and rhythm, S1 and S2 normal, no murmur, rub   or gallop   Abdomen:     Soft, non-tender, bowel sounds active   Extremities:   Extremities normal, atraumatic, no cyanosis or edema   Skin:   Skin color, texture, turgor normal, no rashes or lesions   Lymph nodes:   Cervical normal   Neurologic:   CNII-XII intact            Lab, Imaging and other studies: I have personally reviewed pertinent labs    CBC:   Lab Results   Component Value Date    WBC 10 80 09/09/2019    HGB 10 0 (L) 09/09/2019    HCT 29 5 (L) 09/09/2019    MCV 87 09/09/2019     09/09/2019    MCH 29 6 09/09/2019    MCHC 33 9 09/09/2019    RDW 15 0 (H) 09/09/2019    MPV 8 1 (L) 09/09/2019     CMP:   Lab Results   Component Value Date    K 3 8 09/09/2019     09/09/2019    CO2 18 (L) 09/09/2019 BUN 54 (H) 09/09/2019    CREATININE 3 12 (H) 09/09/2019    CALCIUM 8 3 (L) 09/09/2019    EGFR 18 09/09/2019         Results from last 7 days   Lab Units 09/09/19  0519 09/08/19  0529 09/07/19  0503 09/06/19  0610  09/04/19  0410   POTASSIUM mmol/L 3 8 3 6 3 7 3 5   < > 4 0   CHLORIDE mmol/L 107 109* 112* 111*   < > 110*   CO2 mmol/L 18* 17* 19* 17*   < > 12*   BUN mg/dL 54* 50* 44* 32*   < > 44*   CREATININE mg/dL 3 12* 2 93* 2 83* 2 22*   < > 2 36*   CALCIUM mg/dL 8 3* 8 6 8 7 8 7   < > 8 6   ALK PHOS U/L  --  81  --  82  --  83   ALT U/L  --  17  --  13  --  7   AST U/L  --  10*  --  11*  --  8*    < > = values in this interval not displayed  Phosphorus: No results found for: PHOS  Magnesium: No results found for: MG  Urinalysis: No results found for: [place], SPECGRAV, PHUR, LEUKOCYTESUR, NITRITE, PROTEINUA, GLUCOSEU, KETONESU, BILIRUBINUR, BLOODU  Ionized Calcium: No results found for: CAION  Coagulation: No results found for: PT, INR, APTT  Troponin: No results found for: TROPONINI  ABG: No results found for: PHART, FEX7APK, PO2ART, BVM6IFZ, X4JRRIAQ, BEART, SOURCE  Radiology review:     IMAGING  Procedure: Nm Hepatobiliary    Result Date: 9/6/2019  Narrative: HEPATOBILIARY SCAN INDICATION: Abdominal pain and nausea, vomiting  COMPARISON:  Right upper quadrant ultrasound 9/4/2019 TECHNIQUE:   Following the intravenous administration of 5 2 mCi Tc-99m labeled mebrofenin, dynamic abdominal imaging was obtained in the anterior projection over a 60 minute time period  FINDINGS:  There is prompt, uniform accumulation with normal clearance of the radiopharmaceutical by the liver  There is normal filling of the intrahepatic ducts, common bile duct and bowel  At the end of 60 minutes the gallbladder did not visualize and therefore delayed imaging was obtained at both 2 and 4 hours  Again, the gallbladder was not seen     Impression: Nonvisualization of the gallbladder even on 4 hour delayed images  This finding is considered suspicious for cystic duct obstruction and in the appropriate clinical setting, cholecystitis  The examination demonstrates a significant  finding and was documented as such in [facility] for liaison and referring practitioner notification   Workstation performed: [id number]       Current Facility-Administered Medications   Medication Dose Route Frequency    acetaminophen (TYLENOL) tablet 650 mg  650 mg Oral Q6H PRN    amLODIPine (NORVASC) tablet 10 mg  10 mg Oral Daily    aspirin chewable tablet 81 mg  81 mg Oral Daily    b complex-vitamin C-folic acid (NEPHROCAPS) capsule 1 capsule  1 capsule Oral Daily With Dinner    cholecalciferol (VITAMIN D3) tablet 1,000 Units  1,000 Units Oral Daily    colchicine (COLCRYS) tablet 0 6 mg  0 6 mg Oral Daily    heparin (porcine) subcutaneous injection 5,000 Units  5,000 Units Subcutaneous Q8H [address]    HYDROmorphone (DILAUDID) injection 0 5 mg  0 5 mg Intravenous Q3H PRN    insulin glargine (LANTUS) subcutaneous injection 8 Units 0 08 mL  8 Units Subcutaneous HS    insulin lispro (HumaLOG) 100 units/mL subcutaneous injection 1-5 Units  1-5 Units Subcutaneous TID AC    insulin lispro (HumaLOG) 100 units/mL subcutaneous injection 1-5 Units  1-5 Units Subcutaneous HS    levofloxacin (LEVAQUIN) tablet 250 mg  250 mg Oral Q24H    methenamine hippurate (HIPREX) tablet 1 g  1 g Oral BID With Meals    ondansetron (ZOFRAN) injection 4 mg  4 mg Intravenous Q6H PRN    oxyCODONE-acetaminophen (PERCOCET) 5-325 mg per tablet 1 tablet  1 tablet Oral Q6H PRN    pantoprazole (PROTONIX) EC tablet 40 mg  40 mg Oral Early Morning    tamsulosin (FLOMAX) capsule 0 4 mg  0 4 mg Oral Daily With Dinner     Medications Discontinued During This Encounter   Medication Reason    HYDROmorphone (DILAUDID) injection 0 5 mg     sodium chloride 0 9 % infusion     amLODIPine (NORVASC) 2 5 mg tablet     heparin (porcine) subcutaneous injection 5,000 Units     piperacillin-tazobactam (ZOSYN) 3 375 g in sodium chloride 0 9 % 50 mL IVPB     sodium chloride 0 9 % infusion     insulin glargine (LANTUS) subcutaneous injection 6 Units 0 06 mL     piperacillin-tazobactam (ZOSYN) 2 25 g in sodium chloride 0 9 % 50 mL IVPB        Supa Palmer DO

## 2019-09-09 NOTE — ASSESSMENT & PLAN NOTE
· Improved  · Possibly secondary to acute renal failure  · Continue IV fluids  · Nephrology input appreciated  · Monitor closely

## 2019-09-09 NOTE — ASSESSMENT & PLAN NOTE
· Likely multifactorial - H&H is stable  · No signs of acute bleeding  · Will monitor H&H and transfuse as needed

## 2019-09-09 NOTE — PHYSICAL THERAPY NOTE
09/09/19 0932   Pain Assessment   Pain Assessment 0-10   Pain Score No Pain   Restrictions/Precautions   Weight Bearing Precautions Per Order No   Other Precautions Contact/isolation; Chair Alarm; Fall Risk   General   Chart Reviewed Yes   Family/Caregiver Present No   Cognition   Overall Cognitive Status Impaired   Arousal/Participation Alert; Cooperative   Attention Within functional limits   Orientation Level Oriented X4   Memory Decreased short term memory   Following Commands Follows one step commands inconsistently   Comments pt agreed to PT session-amb but deferred LE ex due to fatigue   Subjective   Subjective "I'll try to walk a little with you " Pt denies pain   Bed Mobility   Supine to Sit 3  Moderate assistance   Additional items Assist x 2;Bedrails;HOB elevated; Increased time required;Verbal cues;LE management   Additional Comments pt sat at EOB 5 min prior to standing   Transfers   Sit to Stand 4  Minimal assistance   Additional items Assist x 2;Bedrails; Increased time required;Verbal cues   Stand to Sit 4  Minimal assistance   Additional items Assist x 1; Armrests; Increased time required;Verbal cues   Ambulation/Elevation   Gait pattern Improper Weight shift; Forward Flexion;Decreased foot clearance;Shuffling; Short stride; Excessively slow   Gait Assistance 4  Minimal assist   Additional items Assist x 1;Verbal cues; Tactile cues   Assistive Device Rolling walker   Distance 25 feet x 1   Stair Management Assistance Not tested   Balance   Static Sitting Fair   Dynamic Sitting Fair -   Static Standing Fair -   Dynamic Standing Poor +   Ambulatory Poor +   Endurance Deficit   Endurance Deficit Yes   Endurance Deficit Description fatigue   Activity Tolerance   Activity Tolerance Patient limited by fatigue   Nurse Made Aware KALYN Pineda present   Exercises   Ankle Pumps Sitting;5 reps;AROM; Bilateral   Assessment   Prognosis Fair   Problem List Decreased strength;Decreased endurance; Impaired balance;Decreased mobility; Decreased safety awareness   Assessment Pt seen for PT treatment session this date with interventions consisting of gait training w/ emphasis on improving pt's ability to ambulate level surfaces x 25 feet x 1 with min A provided by therapist with RW  Pt agreeable to PT treatment session upon arrival, pt found supine in bed w/ HOB elevated, in no apparent distress  In comparison to previous session, pt with improvements in amb distance  Post session: chair alarm engaged and all needs in reach seated in bedside chair with SCDs active  Continue to recommend STR at time of d/c in order to maximize pt's functional independence and safety w/ mobility  Pt continues to be functioning below baseline level, and remains limited 2* factors listed above and including decreased strength, endurance & safe functional mobility  PT will continue to see pt while here in order to address the deficits listed above and provide interventions consistent w/ POC in effort to achieve STGs  Barriers to Discharge Inaccessible home environment   Goals   Patient Goals to feel better   Treatment Day 1   Plan   Treatment/Interventions Functional transfer training;LE strengthening/ROM; Therapeutic exercise; Endurance training;Patient/family training;Bed mobility; Equipment eval/education;Gait training;Spoke to nursing   Progress Slow progress, decreased activity tolerance   PT Frequency   (3-5 x week)   Recommendation   Recommendation Short-term skilled PT   Equipment Recommended Walker   PT - OK to Discharge Yes  (when med cleared to STR)   Phong Clark PTA

## 2019-09-09 NOTE — PROGRESS NOTES
Progress Note - General Surgery   Ty Pina [de-identified] y o  male MRN: [id number]  Unit/Bed#: -02 Encounter: [id number]    Assessment:  Patient appears alert and comfortable, OOB to chair eating a sandwich  Cholecystostomy putting out bile, no sludge  Creatinine continues to creep up    Plan:  Maintain cholecystostomy to bulb suction  Continue a short course of levaquin for acute cholecystitis      Subjective/Objective   Chief Complaint: Comfortable, feels some discomfort at cholecystostomy site when reaching over his head    Subjective: Appears more comfortable and alert    Objective:     Blood pressure 168/88, pulse 78, temperature 97 6 °F (36 4 °C), temperature source Tympanic, resp  rate 18, height 5' 8" (1 727 m), weight 75 4 kg (166 lb 3 6 oz), SpO2 97 %  ,Body mass index is 25 27 kg/m²        Intake/Output Summary (Last 24 hours) at 9/9/2019 1010  Last data filed at 9/9/2019 0912  Gross per 24 hour   Intake 210 ml   Output 3570 ml   Net -3360 ml       Invasive Devices     Peripheral Intravenous Line            Peripheral IV 09/06/19 Right Forearm 2 days          Drain            Urethral Catheter Latex 16 Fr  72 days    Closed/Suction Drain Right RUQ Bulb 10 2 Fr  2 days                Physical Exam:   OOB to chair  Cholecystostomy with clear bile    Lab, Imaging and other studies:  CBC:   Lab Results   Component Value Date    WBC 10 80 09/09/2019    HGB 10 0 (L) 09/09/2019    HCT 29 5 (L) 09/09/2019    MCV 87 09/09/2019     09/09/2019    MCH 29 6 09/09/2019    MCHC 33 9 09/09/2019    RDW 15 0 (H) 09/09/2019    MPV 8 1 (L) 09/09/2019   , CMP:   Lab Results   Component Value Date    SODIUM 136 09/09/2019    K 3 8 09/09/2019     09/09/2019    CO2 18 (L) 09/09/2019    BUN 54 (H) 09/09/2019    CREATININE 3 12 (H) 09/09/2019    CALCIUM 8 3 (L) 09/09/2019    EGFR 18 09/09/2019     VTE Pharmacologic Prophylaxis: Heparin  VTE Mechanical Prophylaxis: sequential compression device

## 2019-09-09 NOTE — ASSESSMENT & PLAN NOTE
· With metastatic disease   · Patient currently on monthly chemo injections  · Continue outpatient follow-up with Dr Felipe Torres   · Continue supportive measures

## 2019-09-09 NOTE — PLAN OF CARE
Problem: Prexisting or High Potential for Compromised Skin Integrity  Goal: Skin integrity is maintained or improved  Description  INTERVENTIONS:  - Identify patients at risk for skin breakdown  - Assess and monitor skin integrity  - Assess and monitor nutrition and hydration status  - Monitor labs   - Assess for incontinence   - Turn and reposition patient  - Assist with mobility/ambulation  - Relieve pressure over bony prominences  - Avoid friction and shearing  - Provide appropriate hygiene as needed including keeping skin clean and dry  - Evaluate need for skin moisturizer/barrier cream  - Collaborate with interdisciplinary team   - Patient/family teaching  - Consider wound care consult   Outcome: Progressing     Problem: Potential for Falls  Goal: Patient will remain free of falls  Description  INTERVENTIONS:  - Assess patient frequently for physical needs  -  Identify cognitive and physical deficits and behaviors that affect risk of falls    -  [place] fall precautions as indicated by assessment   - Educate patient/family on patient safety including physical limitations  - Instruct patient to call for assistance with activity based on assessment  - Modify environment to reduce risk of injury  - Consider OT/PT consult to assist with strengthening/mobility  Outcome: Progressing     Problem: GASTROINTESTINAL - ADULT  Goal: Minimal or absence of nausea and/or vomiting  Description  INTERVENTIONS:  - Administer IV fluids if ordered to ensure adequate hydration  - Maintain NPO status until nausea and vomiting are resolved  - Nasogastric tube if ordered  - Administer ordered antiemetic medications as needed  - Provide nonpharmacologic comfort measures as appropriate  - Advance diet as tolerated, if ordered  - Consider nutrition services referral to assist patient with adequate nutrition and appropriate food choices  Outcome: Progressing  Goal: Maintains or returns to baseline bowel function  Description  INTERVENTIONS:  - Assess bowel function  - Encourage oral fluids to ensure adequate hydration  - Administer IV fluids if ordered to ensure adequate hydration  - Administer ordered medications as needed  - Encourage mobilization and activity  - Consider nutritional services referral to assist patient with adequate nutrition and appropriate food choices  Outcome: Progressing  Goal: Maintains adequate nutritional intake  Description  INTERVENTIONS:  - Monitor percentage of each meal consumed  - Identify factors contributing to decreased intake, treat as appropriate  - Assist with meals as needed  - Monitor I&O, weight, and lab values if indicated  - Obtain nutrition services referral as needed  Outcome: Progressing  Goal: Establish and maintain optimal ostomy function  Description  INTERVENTIONS:  - Assess bowel function  - Encourage oral fluids to ensure adequate hydration  - Administer IV fluids if ordered to ensure adequate hydration   - Administer ordered medications as needed  - Encourage mobilization and activity  - Nutrition services referral to assist patient with appropriate food choices  - Assess stoma site  - Consider wound care consult   Outcome: Progressing     Problem: GENITOURINARY - ADULT  Goal: Maintains or returns to baseline urinary function  Description  INTERVENTIONS:  - Assess urinary function  - Encourage oral fluids to ensure adequate hydration if ordered  - Administer IV fluids as ordered to ensure adequate hydration  - Administer ordered medications as needed  - Offer frequent toileting  - Follow urinary retention protocol if ordered  Outcome: Progressing  Goal: Absence of urinary retention  Description  INTERVENTIONS:  - Assess patients ability to void and empty bladder  - Monitor I/O  - Bladder scan as needed  - Discuss with physician/AP medications to alleviate retention as needed  - Discuss catheterization for long term situations as appropriate  Outcome: Progressing  Goal: Urinary catheter remains patent  Description  INTERVENTIONS:  - Assess patency of urinary catheter  - If patient has a chronic garcia, consider changing catheter if non-functioning  - Follow guidelines for intermittent irrigation of non-functioning urinary catheter  Outcome: Progressing     Problem: METABOLIC, FLUID AND ELECTROLYTES - ADULT  Goal: Electrolytes maintained within normal limits  Description  INTERVENTIONS:  - Monitor labs and assess patient for signs and symptoms of electrolyte imbalances  - Administer electrolyte replacement as ordered  - Monitor response to electrolyte replacements, including repeat lab results as appropriate  - Instruct patient on fluid and nutrition as appropriate  Outcome: Progressing  Goal: Fluid balance maintained  Description  INTERVENTIONS:  - Monitor labs   - Monitor I/O and WT  - Instruct patient on fluid and nutrition as appropriate  - Assess for signs & symptoms of volume excess or deficit  Outcome: Progressing  Goal: Glucose maintained within target range  Description  INTERVENTIONS:  - Monitor Blood Glucose as ordered  - Assess for signs and symptoms of hyperglycemia and hypoglycemia  - Administer ordered medications to maintain glucose within target range  - Assess nutritional intake and initiate nutrition service referral as needed  Outcome: Progressing     Problem: SKIN/TISSUE INTEGRITY - ADULT  Goal: Skin integrity remains intact  Description  INTERVENTIONS  - Identify patients at risk for skin breakdown  - Assess and monitor skin integrity  - Assess and monitor nutrition and hydration status  - Monitor labs (i e  albumin)  - Assess for incontinence   - Turn and reposition patient  - Assist with mobility/ambulation  - Relieve pressure over bony prominences  - Avoid friction and shearing  - Provide appropriate hygiene as needed including keeping skin clean and dry  - Evaluate need for skin moisturizer/barrier cream  - Collaborate with interdisciplinary team (i e  Nutrition, Rehabilitation, etc )   - Patient/family teaching  Outcome: Progressing  Goal: Incision(s), wounds(s) or drain site(s) healing without S/S of infection  Description  INTERVENTIONS  - Assess and document risk factors for skin impairment   - Assess and document dressing, incision, wound bed, drain sites and surrounding tissue  - Consider nutrition services referral as needed  - Oral mucous membranes remain intact  - Provide patient/ family education  Outcome: Progressing  Goal: Oral mucous membranes remain intact  Description  INTERVENTIONS  - Assess oral mucosa and hygiene practices  - Implement preventative oral hygiene regimen  - Implement oral medicated treatments as ordered  - Initiate Nutrition services referral as needed  Outcome: Progressing     Problem: PAIN - ADULT  Goal: Verbalizes/displays adequate comfort level or baseline comfort level  Description  Interventions:  - Encourage patient to monitor pain and request assistance  - Assess pain using appropriate pain scale  - Administer analgesics based on type and severity of pain and evaluate response  - Implement non-pharmacological measures as appropriate and evaluate response  - Consider cultural and social influences on pain and pain management  - Notify physician/advanced practitioner if interventions unsuccessful or patient reports new pain  Outcome: Progressing     Problem: INFECTION - ADULT  Goal: Absence or prevention of progression during hospitalization  Description  INTERVENTIONS:  - Assess and monitor for signs and symptoms of infection  - Monitor lab/diagnostic results  - Monitor all insertion sites, i e  indwelling lines, tubes, and drains  - Monitor endotracheal if appropriate and nasal secretions for changes in amount and color  - [place] appropriate cooling/warming therapies per order  - Administer medications as ordered  - Instruct and encourage patient and family to use good hand hygiene technique  - Identify and instruct in appropriate isolation precautions for identified infection/condition  Outcome: Progressing  Goal: Absence of fever/infection during neutropenic period  Description  INTERVENTIONS:  - Monitor WBC    Outcome: Progressing     Problem: SAFETY ADULT  Goal: Maintain or return to baseline ADL function  Description  INTERVENTIONS:  -  Assess patient's ability to carry out ADLs; assess patient's baseline for ADL function and identify physical deficits which impact ability to perform ADLs (bathing, care of mouth/teeth, toileting, grooming, dressing, etc )  - Assess/evaluate cause of self-care deficits   - Assess range of motion  - Assess patient's mobility; develop plan if impaired  - Assess patient's need for assistive devices and provide as appropriate  - Encourage maximum independence but intervene and supervise when necessary  - Involve family in performance of ADLs  - Assess for home care needs following discharge   - Consider OT consult to assist with ADL evaluation and planning for discharge  - Provide patient education as appropriate  Outcome: Progressing  Goal: Maintain or return mobility status to optimal level  Description  INTERVENTIONS:  - Assess patient's baseline mobility status (ambulation, transfers, stairs, etc )    - Identify cognitive and physical deficits and behaviors that affect mobility  - Identify mobility aids required to assist with transfers and/or ambulation (gait belt, sit-to-stand, lift, walker, cane, etc )  - [place] fall precautions as indicated by assessment  - Record patient progress and toleration of activity level on Mobility SBAR; progress patient to next Phase/Stage  - Instruct patient to call for assistance with activity based on assessment  - Consider rehabilitation consult to assist with strengthening/weightbearing, etc   Outcome: Progressing     Problem: DISCHARGE PLANNING  Goal: Discharge to home or other facility with appropriate resources  Description  INTERVENTIONS:  - Identify barriers to discharge w/patient and caregiver  - Arrange for needed discharge resources and transportation as appropriate  - Identify discharge learning needs (meds, wound care, etc )  - Arrange for interpretive services to assist at discharge as needed  - Refer to Case Management Department for coordinating discharge planning if the patient needs post-hospital services based on physician/advanced practitioner order or complex needs related to functional status, cognitive ability, or social support system  Outcome: Progressing     Problem: Knowledge Deficit  Goal: Patient/family/caregiver demonstrates understanding of disease process, treatment plan, medications, and discharge instructions  Description  Complete learning assessment and assess knowledge base    Interventions:  - Provide teaching at level of understanding  - Provide teaching via preferred learning methods  Outcome: Progressing     Problem: DISCHARGE PLANNING - CARE MANAGEMENT  Goal: Discharge to post-acute care or home with appropriate resources  Description  INTERVENTIONS:  - Conduct assessment to determine patient/family and health care team treatment goals, and need for post-acute services based on payer coverage, community resources, and patient preferences, and barriers to discharge  - Address psychosocial, clinical, and financial barriers to discharge as identified in assessment in conjunction with the patient/family and health care team  - Arrange appropriate level of post-acute services according to patient's   needs and preference and payer coverage in collaboration with the physician and health care team  - Communicate with and update the patient/family, physician, and health care team regarding progress on the discharge plan  - Arrange appropriate transportation to post-acute venues    Pt's goal is to return home with  and [facility], pt [facility] active with [facility], pt needs to be assessed by pt dept   Outcome: Progressing     Problem: Nutrition/Hydration-ADULT  Goal: Nutrient/Hydration intake appropriate for improving, restoring or maintaining nutritional needs  Description  Monitor and assess patient's nutrition/hydration status for malnutrition  Collaborate with interdisciplinary team and initiate plan and interventions as ordered  Monitor patient's weight and dietary intake as ordered or per policy  Utilize nutrition screening tool and intervene as necessary  Determine patient's food preferences and provide high-protein, high-caloric foods as appropriate       INTERVENTIONS:  - Monitor oral intake, urinary output, labs, and treatment plans  - Assess nutrition and hydration status and recommend course of action  - Evaluate amount of meals eaten  - Assist patient with eating if necessary   - Allow adequate time for meals  - Recommend/ encourage appropriate diets, oral nutritional supplements, and vitamin/mineral supplements  - Order, calculate, and assess calorie counts as needed  - Recommend, monitor, and adjust tube feedings and TPN/PPN based on assessed needs  - Assess need for intravenous fluids  - Provide specific nutrition/hydration education as appropriate  - Include patient/family/caregiver in decisions related to nutrition  Outcome: Progressing

## 2019-09-10 VITALS
BODY MASS INDEX: 25.19 KG/M2 | WEIGHT: 166.23 LBS | HEIGHT: 68 IN | OXYGEN SATURATION: 96 % | RESPIRATION RATE: 18 BRPM | SYSTOLIC BLOOD PRESSURE: 138 MMHG | TEMPERATURE: 97.1 F | HEART RATE: 77 BPM | DIASTOLIC BLOOD PRESSURE: 64 MMHG

## 2019-09-10 LAB
ANION GAP SERPL CALCULATED.3IONS-SCNC: 11 MMOL/L (ref 4–13)
BASOPHILS # BLD AUTO: 0 THOUSANDS/ΜL (ref 0–0.1)
BASOPHILS NFR BLD AUTO: 0 % (ref 0–2)
BUN SERPL-MCNC: 51 MG/DL (ref 7–25)
CALCIUM SERPL-MCNC: 8.8 MG/DL (ref 8.6–10.5)
CHLORIDE SERPL-SCNC: 107 MMOL/L (ref 98–107)
CO2 SERPL-SCNC: 18 MMOL/L (ref 21–31)
CREAT SERPL-MCNC: 2.93 MG/DL (ref 0.7–1.3)
EOSINOPHIL # BLD AUTO: 0.2 THOUSAND/ΜL (ref 0–0.61)
EOSINOPHIL NFR BLD AUTO: 1 % (ref 0–5)
ERYTHROCYTE [DISTWIDTH] IN BLOOD BY AUTOMATED COUNT: 15.1 % (ref 11.5–14.5)
GFR SERPL CREATININE-BSD FRML MDRD: 19 ML/MIN/1.73SQ M
GLUCOSE SERPL-MCNC: 177 MG/DL (ref 65–99)
GLUCOSE SERPL-MCNC: 178 MG/DL (ref 65–140)
GLUCOSE SERPL-MCNC: 331 MG/DL (ref 65–140)
HCT VFR BLD AUTO: 33.7 % (ref 42–47)
HGB BLD-MCNC: 11.3 G/DL (ref 14–18)
LYMPHOCYTES # BLD AUTO: 1.2 THOUSANDS/ΜL (ref 0.6–4.47)
LYMPHOCYTES NFR BLD AUTO: 9 % (ref 21–51)
MCH RBC QN AUTO: 29.5 PG (ref 26–34)
MCHC RBC AUTO-ENTMCNC: 33.5 G/DL (ref 31–37)
MCV RBC AUTO: 88 FL (ref 81–99)
MONOCYTES # BLD AUTO: 1.3 THOUSAND/ΜL (ref 0.17–1.22)
MONOCYTES NFR BLD AUTO: 9 % (ref 2–12)
NEUTROPHILS # BLD AUTO: 11.7 THOUSANDS/ΜL (ref 1.4–6.5)
NEUTS SEG NFR BLD AUTO: 81 % (ref 42–75)
PLATELET # BLD AUTO: 241 THOUSANDS/UL (ref 149–390)
PMV BLD AUTO: 7.7 FL (ref 8.6–11.7)
POTASSIUM SERPL-SCNC: 3.8 MMOL/L (ref 3.5–5.5)
RBC # BLD AUTO: 3.83 MILLION/UL (ref 4.3–5.9)
SODIUM SERPL-SCNC: 136 MMOL/L (ref 134–143)
WBC # BLD AUTO: 14.5 THOUSAND/UL (ref 4.8–10.8)

## 2019-09-10 PROCEDURE — 99232 SBSQ HOSP IP/OBS MODERATE 35: CPT | Performed by: SPECIALIST

## 2019-09-10 PROCEDURE — 99232 SBSQ HOSP IP/OBS MODERATE 35: CPT | Performed by: INTERNAL MEDICINE

## 2019-09-10 PROCEDURE — 99239 HOSP IP/OBS DSCHRG MGMT >30: CPT | Performed by: HOSPITALIST

## 2019-09-10 PROCEDURE — 85025 COMPLETE CBC W/AUTO DIFF WBC: CPT | Performed by: HOSPITALIST

## 2019-09-10 PROCEDURE — 82948 REAGENT STRIP/BLOOD GLUCOSE: CPT

## 2019-09-10 PROCEDURE — 80048 BASIC METABOLIC PNL TOTAL CA: CPT | Performed by: HOSPITALIST

## 2019-09-10 RX ORDER — LEVOFLOXACIN 250 MG/1
250 TABLET ORAL EVERY 24 HOURS
Qty: 5 TABLET | Refills: 0 | Status: SHIPPED | OUTPATIENT
Start: 2019-09-10 | End: 2019-09-18 | Stop reason: HOSPADM

## 2019-09-10 RX ORDER — OXYCODONE HYDROCHLORIDE AND ACETAMINOPHEN 5; 325 MG/1; MG/1
1 TABLET ORAL EVERY 6 HOURS PRN
Qty: 15 TABLET | Refills: 0 | Status: SHIPPED | OUTPATIENT
Start: 2019-09-10 | End: 2019-09-20

## 2019-09-10 RX ADMIN — INSULIN LISPRO 1 UNITS: 100 INJECTION, SOLUTION INTRAVENOUS; SUBCUTANEOUS at 08:00

## 2019-09-10 RX ADMIN — HEPARIN SODIUM 5000 UNITS: 5000 INJECTION INTRAVENOUS; SUBCUTANEOUS at 06:30

## 2019-09-10 RX ADMIN — PANTOPRAZOLE SODIUM 40 MG: 40 TABLET, DELAYED RELEASE ORAL at 06:30

## 2019-09-10 RX ADMIN — OXYCODONE HYDROCHLORIDE AND ACETAMINOPHEN 1 TABLET: 5; 325 TABLET ORAL at 06:35

## 2019-09-10 RX ADMIN — AMLODIPINE BESYLATE 10 MG: 5 TABLET ORAL at 09:41

## 2019-09-10 RX ADMIN — ASPIRIN 81 MG 81 MG: 81 TABLET ORAL at 09:41

## 2019-09-10 RX ADMIN — METHENAMINE HIPPURATE 1 G: 1 TABLET ORAL at 09:41

## 2019-09-10 RX ADMIN — INSULIN LISPRO 5 UNITS: 100 INJECTION, SOLUTION INTRAVENOUS; SUBCUTANEOUS at 11:32

## 2019-09-10 RX ADMIN — VITAMIN D, TAB 1000IU (100/BT) 1000 UNITS: 25 TAB at 09:41

## 2019-09-10 RX ADMIN — COLCHICINE 0.6 MG: 0.6 TABLET, FILM COATED ORAL at 09:41

## 2019-09-10 NOTE — ASSESSMENT & PLAN NOTE
· With CKD 3 with baseline Cr 1 4-1 5 mg/dL   · Creatinine continues to worsen  · Case reviewed with Dr Eliot Galeazzi  · Acute tubular necrosis has plateaued and kidney function is beginning to improve  · Cleared by Dr Eliot Galeazzi for discharge home from his standpoint  · Dr Eliot Galeazzi is also the patient's primary care physician and he will monitor renal function in the outpatient setting

## 2019-09-10 NOTE — ASSESSMENT & PLAN NOTE
Lab Results   Component Value Date    HGBA1C 8 1 (H) 06/14/2019       Recent Labs     09/09/19  1059 09/09/19  1550 09/09/19  2006 09/10/19  0629   POCGLU 312* 362* 271* 178*       Blood Sugar Average: Last 72 hrs:  (P) 259   · DC home on pre-admit meds at pre-admit dosages

## 2019-09-10 NOTE — ASSESSMENT & PLAN NOTE
· Secondary to cholecystitis with marked leukocytosis   · US RUQ shows sludge, HIDA scan is indicative of cholecystitis   · Status post a percutaneous cholecystostomy on September 6, 2019  · Will eventually need a full cholecystectomy  · Case reviewed with Dr Stephanie Louie  · Okay for discharge on 5 more days worth of p o   Levofloxacin  · Patient will be re-evaluated in the outpatient setting for a full cholecystitis  · Case management has set up a visiting nurse for drain management at home

## 2019-09-10 NOTE — PROGRESS NOTES
Progress Note - General Surgery   Denzel Kanner [de-identified] y o  male MRN: [id number]  Unit/Bed#: -02 Encounter: [id number]    Assessment:  Patient comfortable, tolerating PO  Cholecystostomy with clear bile, 180 cc charted  WBC 14 5  VSS AF  Cultured Bile - no growth  Creat 3 12 ->2 93  Unremarkable bowel movement yesterday      Plan:  Discharge with Cholecystostomy to bulb suction  Short Course of Levaquin  F/U in office to discuss eventual cholecystectomy, I suspect that cholecystectomy would be on hold so as not to delay treatment of Metastatic Neuroendocrine Tumor  Subjective/Objective   Chief Complaint: Feeling better    Subjective: Comfortable at rest, finished off his lunch tray    Objective:     Blood pressure 138/64, pulse 77, temperature (!) 97 1 °F (36 2 °C), temperature source Tympanic, resp  rate 18, height 5' 8" (1 727 m), weight 75 4 kg (166 lb 3 6 oz), SpO2 96 %  ,Body mass index is 25 27 kg/m²  Intake/Output Summary (Last 24 hours) at 9/10/2019 1206  Last data filed at 9/10/2019 0700  Gross per 24 hour   Intake 580 ml   Output 1490 ml   Net -910 ml       Invasive Devices     Drain            Urethral Catheter Latex 16 Fr  73 days    Closed/Suction Drain Right RUQ Bulb 10 2 Fr  3 days                Physical Exam:   Abdomen minimally distended  Cholecystostomy site in RUQ clean and dry, with clear bile in cholecystostomy tube  Minimal discomfort with palpation      Lab, Imaging and other studies:  CBC:   Lab Results   Component Value Date    WBC 14 50 (H) 09/10/2019    HGB 11 3 (L) 09/10/2019    HCT 33 7 (L) 09/10/2019    MCV 88 09/10/2019     09/10/2019    MCH 29 5 09/10/2019    MCHC 33 5 09/10/2019    RDW 15 1 (H) 09/10/2019    MPV 7 7 (L) 09/10/2019   , CMP:   Lab Results   Component Value Date    SODIUM 136 09/10/2019    K 3 8 09/10/2019     09/10/2019    CO2 18 (L) 09/10/2019    BUN 51 (H) 09/10/2019    CREATININE 2 93 (H) 09/10/2019    CALCIUM 8 8 09/10/2019    EGFR 19 09/10/2019     VTE Pharmacologic Prophylaxis: Heparin  VTE Mechanical Prophylaxis: sequential compression device

## 2019-09-10 NOTE — SOCIAL WORK
Chart reviewed by case management, d/c order written, I met with the pt and his Significant this morning, they are in agreement with the d/c plan, they deny any other d/c needs, [facility] was made aware of the d/c , outpt cm referral was made, I was unable to schedule an appointment with his pcp  I am awaiting a call back  Pt and family are in agreement with the d/c and d/c plan home with [facility]

## 2019-09-10 NOTE — PROGRESS NOTES
Progress Note - Nephrology   Bronwyn Silva [de-identified] y o  male MRN: [id number]  Unit/Bed#: -02 Encounter: [id number]    A/P:  1  Acute renal failure due to acute tubular necrosis             Creatinine slightly improved this morning, urine studies yesterday confirmed findings consistent with acute tubular necrosis persist   Continue supportive care  2  Chronic kidney disease stage 3 with baseline creatinine likely around 1 4 - 1 5 mg/dL              As mentioned in prior notes, patient has not been able to achieve this prior baseline creatinine of 0 9 - 1 mg/dL for some time  ===========  3  Probable diabetic nephropathy  4  Proteinuria  5  Acidosis                QNVAR bicarbonate remains low, but would be hesitant to provide any supplemental bicarbonate at this time  Would simply continue follow along closely  6  Neuroendocrine tumor               Continue follow-up with Dr Nguyen Red is continue with Lanreotide injections every 4 weeks for now   =============  7  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              Continue Haile catheter  8  Right hydroureter nephrosis              Continued observation per Urology recommendations  9  Abdominal pain with possible cholecystitis                Today is day 4 status post cholecystostomy, continue monitor closely  Patient for possible cholecystectomy in the outpatient setting  As mentioned prior note, would await for the patient's serum creatinine to come down to baseline prior to proceeding with an elective cholecystectomy    10  Iron deficiency anemia               resolved      Follow up reason for today's visit:  Acute renal failure due to acute tubular necrosis/chronic kidney disease    Acute kidney injury superimposed on chronic kidney disease ([facility] [address] )    Patient Active Problem List   Diagnosis    Weakness generalized    Type 2 diabetes mellitus with stage 3 chronic kidney disease, with long-term current use of insulin ([facility] [address] )    Essential hypertension    Mixed hyperlipidemia    Cardiac disease    Generalized abdominal mass    Hydroureter    Metabolic acidosis    Iron deficiency anemia secondary to inadequate dietary iron intake    Syncope and collapse    Accelerated hypertension    Liver mass    Neuroendocrine tumor    Acute cystitis without hematuria    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)    Acute kidney injury superimposed on chronic kidney disease (HCC)    Acute pain of left knee    Pressure injury of right heel, unstageable ([facility] [address] )    Atherosclerosis of artery of extremity with ulceration (HCC)    Carcinoid syndrome (HCC)    Gram-negative bacteremia    Left hip pain    Acute cystitis with hematuria    Chronic indwelling Garcia catheter    Moderate protein-calorie malnutrition (HCC)    Biliary sludge    Urinary tract infection due to extended-spectrum beta lactamase (ESBL) producing Escherichia coli    Malignant neuroendocrine neoplasm (HCC)    Generalized abdominal pain    Urinary retention         Subjective:   No acute complaints, patient is eating and drinking without abdominal pain    Objective:     Vitals: Blood pressure 138/64, pulse 77, temperature (!) 97 1 °F (36 2 °C), temperature source Tympanic, resp  rate 18, height 5' 8" (1 727 m), weight 75 4 kg (166 lb 3 6 oz), SpO2 96 %  ,Body mass index is 25 27 kg/m²  Weight (last 2 days)     Date/Time   Weight    09/10/19 0600   75 4 (166 23)    09/09/19 0557   75 4 (166 23)    09/08/19 0600   73 (160 94)                Intake/Output Summary (Last 24 hours) at 9/10/2019 0957  Last data filed at 9/10/2019 0700  Gross per 24 hour   Intake 580 ml   Output 1490 ml   Net -910 ml     I/O last 3 completed shifts: In: 1150 [P O :1150]  Out: 0410 [Urine:4950; Drains:320]    Urethral Catheter Latex 16 Fr   (Active)   Reasons to continue Urinary Catheter  Chronic urinary catheter 9/9/2019  9:12 AM   Goal for Removal N/A- chronic garcia 9/9/2019 9:12 AM   Site Assessment Clean;Skin intact 9/9/2019  9:12 AM   Collection Container Standard drainage bag 9/9/2019  9:12 AM   Securement Method Securing device (Describe) 9/9/2019  9:12 AM   Output (mL) 900 mL 9/10/2019  7:00 AM       Physical Exam: /64 (BP Location: Left arm)   Pulse 77   Temp (!) 97 1 °F (36 2 °C) (Tympanic)   Resp 18   Ht 5' 8" (1 727 m)   Wt 75 4 kg (166 lb 3 6 oz)   SpO2 96%   BMI 25 27 kg/m²     General Appearance:    Alert, cooperative, no distress, appears stated age   Head:    Normocephalic, without obvious abnormality, atraumatic   Eyes:    Conjunctiva/corneas clear   Ears:    Normal external ears   Nose:   Nares normal, septum midline, mucosa normal, no drainage    or sinus tenderness   Throat:   Lips, mucosa, and tongue normal; teeth and gums normal   Neck:   Supple   Back:     Symmetric, no curvature, ROM normal, no CVA tenderness   Lungs:     Clear to auscultation bilaterally, respirations unlabored   Chest wall:    No tenderness or deformity   Heart:    Regular rate and rhythm, S1 and S2 normal, no murmur, rub   or gallop   Abdomen:     Soft, non-tender, bowel sounds active   Extremities:   Extremities normal, atraumatic, no cyanosis or edema   Skin:   Skin color, texture, turgor normal, no rashes or lesions   Lymph nodes:   Cervical normal   Neurologic:   CNII-XII intact            Lab, Imaging and other studies: I have personally reviewed pertinent labs  CBC:   Lab Results   Component Value Date    WBC 14 50 (H) 09/10/2019    HGB 11 3 (L) 09/10/2019    HCT 33 7 (L) 09/10/2019    MCV 88 09/10/2019     09/10/2019    MCH 29 5 09/10/2019    MCHC 33 5 09/10/2019    RDW 15 1 (H) 09/10/2019    MPV 7 7 (L) 09/10/2019     CMP:   Lab Results   Component Value Date    K 3 8 09/10/2019     09/10/2019    CO2 18 (L) 09/10/2019    BUN 51 (H) 09/10/2019    CREATININE 2 93 (H) 09/10/2019    CALCIUM 8 8 09/10/2019    EGFR 19 09/10/2019           Results from last 7 days Lab Units 09/10/19  0626 09/09/19  0519 09/08/19  0529  09/06/19  0610  09/04/19  0410   POTASSIUM mmol/L 3 8 3 8 3 6   < > 3 5   < > 4 0   CHLORIDE mmol/L 107 107 109*   < > 111*   < > 110*   CO2 mmol/L 18* 18* 17*   < > 17*   < > 12*   BUN mg/dL 51* 54* 50*   < > 32*   < > 44*   CREATININE mg/dL 2 93* 3 12* 2 93*   < > 2 22*   < > 2 36*   CALCIUM mg/dL 8 8 8 3* 8 6   < > 8 7   < > 8 6   ALK PHOS U/L  --   --  81  --  82  --  83   ALT U/L  --   --  17  --  13  --  7   AST U/L  --   --  10*  --  11*  --  8*    < > = values in this interval not displayed  Phosphorus: No results found for: PHOS  Magnesium: No results found for: MG  Urinalysis: No results found for: Kalyn Pod, SPECGRAV, PHUR, LEUKOCYTESUR, NITRITE, PROTEINUA, GLUCOSEU, KETONESU, BILIRUBINUR, BLOODU  Ionized Calcium: No results found for: CAION  Coagulation: No results found for: PT, INR, APTT  Troponin: No results found for: TROPONINI  ABG: No results found for: PHART, MZF4MAT, PO2ART, KBH5DYU, Y5ZGCVCT, BEART, SOURCE  Radiology review:     IMAGING  No results found      Current Facility-Administered Medications   Medication Dose Route Frequency    acetaminophen (TYLENOL) tablet 650 mg  650 mg Oral Q6H PRN    amLODIPine (NORVASC) tablet 10 mg  10 mg Oral Daily    aspirin chewable tablet 81 mg  81 mg Oral Daily    b complex-vitamin C-folic acid (NEPHROCAPS) capsule 1 capsule  1 capsule Oral Daily With Dinner    cholecalciferol (VITAMIN D3) tablet 1,000 Units  1,000 Units Oral Daily    colchicine (COLCRYS) tablet 0 6 mg  0 6 mg Oral Daily    heparin (porcine) subcutaneous injection 5,000 Units  5,000 Units Subcutaneous Q8H [address]    HYDROmorphone (DILAUDID) injection 0 5 mg  0 5 mg Intravenous Q3H PRN    insulin glargine (LANTUS) subcutaneous injection 8 Units 0 08 mL  8 Units Subcutaneous HS    insulin lispro (HumaLOG) 100 units/mL subcutaneous injection 1-5 Units  1-5 Units Subcutaneous HS    insulin lispro (HumaLOG) 100 units/mL subcutaneous injection 1-6 Units  1-6 Units Subcutaneous TID AC    levofloxacin (LEVAQUIN) tablet 250 mg  250 mg Oral Q24H    methenamine hippurate (HIPREX) tablet 1 g  1 g Oral BID With Meals    ondansetron (ZOFRAN) injection 4 mg  4 mg Intravenous Q6H PRN    oxyCODONE-acetaminophen (PERCOCET) 5-325 mg per tablet 1 tablet  1 tablet Oral Q6H PRN    pantoprazole (PROTONIX) EC tablet 40 mg  40 mg Oral Early Morning    tamsulosin (FLOMAX) capsule 0 4 mg  0 4 mg Oral Daily With Dinner     Medications Discontinued During This Encounter   Medication Reason    HYDROmorphone (DILAUDID) injection 0 5 mg     sodium chloride 0 9 % infusion     amLODIPine (NORVASC) 2 5 mg tablet     heparin (porcine) subcutaneous injection 5,000 Units     piperacillin-tazobactam (ZOSYN) 3 375 g in sodium chloride 0 9 % 50 mL IVPB     sodium chloride 0 9 % infusion     insulin glargine (LANTUS) subcutaneous injection 6 Units 0 06 mL     piperacillin-tazobactam (ZOSYN) 2 25 g in sodium chloride 0 9 % 50 mL IVPB     insulin lispro (HumaLOG) 100 units/mL subcutaneous injection 1-5 Units        Marva Gaines DO

## 2019-09-10 NOTE — ASSESSMENT & PLAN NOTE
· Likely multifactorial - H&H is stable  · No signs of acute bleeding  · Will need periodic outpatient CBC monitoring

## 2019-09-10 NOTE — DISCHARGE INSTR - AVS FIRST PAGE
Please ensure that you primary care physician orders routine post hospital discharge blood work including blood work to check it kidney function within 7-10 days  Please follow up with all of the providers as instructed

## 2019-09-10 NOTE — SOCIAL WORK
I was unable to make a pcp appointment before the pt was d/c , [facility] to [facility] until oct 7, I made them aware that was too long, I was told they would call me back with a time,  I did not receive a call so I called back at 14:00 and the pt was given an appointment for 9/181/9 at 10 am, I had to call Dr Jair Pickering office back to reschedule that appointment, I was told they would call me back, I called them back again at 14:55 and I was told the appointment has been set up for 9/25/19 at 12:30PM, I called [facility] and made them aware of the appointment changes

## 2019-09-10 NOTE — ASSESSMENT & PLAN NOTE
· With metastatic disease   · Patient currently on monthly chemo injections  · Continue outpatient follow-up with Dr Tosha Hernandez

## 2019-09-10 NOTE — DISCHARGE SUMMARY
Discharge- Dayanara Redman 1940, [de-identified] y o  male MRN: [id number]    Unit/Bed#: -02 Encounter: [id number]    Primary Care Provider: Bianca Shone, DO   Date and time admitted to hospital: 9/4/2019  3:03 AM        Generalized abdominal pain  Assessment & Plan  · Secondary to cholecystitis with marked leukocytosis   · US RUQ shows sludge, HIDA scan is indicative of cholecystitis   · Status post a percutaneous cholecystostomy on September 6, 2019  · Will eventually need a full cholecystectomy  · Case reviewed with Dr Jaja Cárdenas  · Okay for discharge on 5 more days worth of p o   Levofloxacin  · Patient will be re-evaluated in the outpatient setting for a full cholecystitis  · Case management has set up a visiting nurse for drain management at home      * Acute kidney injury superimposed on chronic kidney disease ([facility] [address] )  Assessment & Plan  · With CKD 3 with baseline Cr 1 4-1 5 mg/dL   · Creatinine continues to worsen  · Case reviewed with Dr Christiano Sosa  · Acute tubular necrosis has plateaued and kidney function is beginning to improve  · Cleared by Dr Christiano Sosa for discharge home from his standpoint  · Dr Christiano Sosa is also the patient's primary care physician and he will monitor renal function in the outpatient setting    Urinary retention  Assessment & Plan  · With obstructive uropathy and chronic garcia   · Urology input appreciated   · Right-sided hydronephrosis has resolved  · Okay for DC home  · Outpatient follow-up urology    Type 2 diabetes mellitus with stage 3 chronic kidney disease, with long-term current use of insulin [facility])  Assessment & Plan  Lab Results   Component Value Date    HGBA1C 8 1 (H) 06/14/2019       Recent Labs     09/09/19  1059 09/09/19  1550 09/09/19  2006 09/10/19  0629   POCGLU 312* 362* 271* 178*       Blood Sugar Average: Last 72 hrs:  (P) 259   · DC home on pre-admit meds at pre-admit dosages    Neuroendocrine tumor  Assessment & Plan  · With metastatic disease · Patient currently on monthly chemo injections  · Continue outpatient follow-up with Dr Claudell Sofia       Metabolic acidosis  Assessment & Plan  · Resolved  · Will need repeat BMP testing as an outpatient in 1 week    Iron deficiency anemia secondary to inadequate dietary iron intake  Assessment & Plan  · Likely multifactorial - H&H is stable  · No signs of acute bleeding  · Will need periodic outpatient CBC monitoring    Accelerated hypertension  Assessment & Plan  · DC home on pre-admit meds at pre-admit dosages          Discharging Physician / Practitioner: Francine Loyola MD  PCP: Luis Eduardo Barry DO  Admission Date:   Admission Orders (From admission, onward)     Ordered        09/04/19 0658  Inpatient Admission (expected length of stay for this patient Order details is greater than two midnights)  Once                   Discharge Date: 09/10/19    Resolved Problems  Date Reviewed: 9/8/2019    None          Consultations During Hospital Stay:  · General surgery  · Urology  · Nephrology  · Interventional Radiology    Procedures Performed:   · Percutaneous cholecystostomy by Interventional Radiology on September 6, 2019    Significant Findings / Test Results:   · CT abdomen pelvis-New right-sided moderate hydroureteronephrosis   No evidence of obstructing radiopaque calculus in the urinary tract to account for the finding  No significant change in the size of the partially calcified soft tissue mass in the root of the mesentery suspicious for neuroendocrine/carcinoid tumor  Minimal increase in the size of several small mesenteric and retroperitoneal lymph nodes   Attention on short-term follow-up  Multiple ill-defined hypodense lesions, some of which appear to be less conspicuous on this exam but not well evaluated without intravenous contrast Moderately distended gallbladder containing sludge   No pericholecystic inflammation   Prostatomegaly   Urinary bladder is decompressed by a Haile catheter, however the degree of bladder wall thickening suggests superimposed chronic bladder outlet obstruction  · Ultrasound right upper quadrant-1  Gallbladder sludge without wall thickening or pericholecystic fluid  If clinical concern persists, consider follow-up HIDA scan  2  Multiple hepatic lesions consistent with known metastatic disease  · Nuclear HIDA scan-Nonvisualization of the gallbladder even on 4 hour delayed images   This finding is considered suspicious for cystic duct obstruction and in the appropriate clinical setting, cholecystitis  Incidental Findings:   · None     Test Results Pending at Discharge (will require follow up): · None     Outpatient Tests Requested:  · None    Complications:     None    Reason for Admission:  Generalized abdominal pain/acute cholecystitis    Hospital Course:     Alin Felix is a [de-identified] y o  male patient who originally presented to the hospital on 9/4/2019 due to generalized abdominal pain  Please refer to the initial history and physical examination completed by Dr Jana Carlos for the initial presenting features and complaints  In brief the patient is a an 79-year-old male who was admitted after he came in complaining of abdominal pain  He had a CT scan of the abdomen and pelvis with the results as outlined above  He was admitted to [facility]  He was started on broad-spectrum IV antibiotics  Patient was found to have an acute kidney injury superimposed on a CKD stage 3 with a baseline creatinine of 1 4-1 5  A nephrology consultation was obtained, IV fluids were started  The CT scan was suggestive of a right-sided hydronephrosis, patient has a chronic indwelling Haile catheter at baseline  A urology consultation was obtained  Urology did not feel that any further aggressive testing or intervention from a urological standpoint was warranted    It was ultimately deemed that the patient had an acute tubular necrosis as the underlying etiology of his renal dysfunction  His creatinine peaked at slightly higher than 3  It was back on the decline at time of discharge  Of note the patient's primary care physician is Dr Mihaela Cash who is also the consulting physician here in the hospital    A general surgery consultation was also obtained  A HIDA scan was performed  The HIDA scan confirmed acute cholecystitis  Patient was not a good candidate for a complete cholecystectomy in view of having an underlying neuroendocrine carcinoma  An Interventional Radiology consultation was obtained  Patient went down for percutaneous cholecystostomy on September 6, 2019  Patient tolerated the procedure well  He continued to receive IV antibiotics postoperatively  Ultimately General surgery transitioned him from IV antibiotics to p o  Levaquin  He was deemed stable for discharge on 09/10/2019 with 5 more days of p o  Levaquin  The patient will be re-evaluated in the outpatient setting by Nephrology for continued renal monitoring, and by General surgery for evaluation for the potential of an eventual complete cholecystectomy  Prior to discharge case management set up home care nursing  Patient was otherwise discharged on all of his other pre-admission medications at the preadmission dosages  Please see above list of diagnoses and related plan for additional information  Condition at Discharge: fair     Discharge Day Visit / Exam:     Subjective:  Patient seen and examined, no new complaints no distress  Is happy about going home  Vitals: Blood Pressure: 138/64 (09/10/19 0753)  Pulse: 77 (09/10/19 0753)  Temperature: (!) 97 1 °F (36 2 °C) (09/10/19 0753)  Temp Source: Tympanic (09/10/19 0753)  Respirations: 18 (09/10/19 0753)  Height: 5' 8" (172 7 cm) (09/04/19 0309)  Weight - Scale: 75 4 kg (166 lb 3 6 oz) (09/10/19 0600)  SpO2: 96 % (09/10/19 0753)  Exam:   Physical Exam   Constitutional: He is oriented to person, place, and time   He appears well-developed and well-nourished  HENT:   Head: Normocephalic and atraumatic  Nose: Nose normal    Mouth/Throat: Oropharynx is clear and moist    Eyes: Pupils are equal, round, and reactive to light  Conjunctivae and EOM are normal    Neck: Normal range of motion  Neck supple  No JVD present  No thyromegaly present  Cardiovascular: Normal rate, regular rhythm and intact distal pulses  Exam reveals no gallop and no friction rub  No murmur heard  Pulmonary/Chest: Effort normal and breath sounds normal  No respiratory distress  Abdominal: Soft  Bowel sounds are normal  He exhibits no distension and no mass  There is no tenderness  There is no guarding  Cholecystostomy drain is in place   Musculoskeletal: Normal range of motion  He exhibits no edema  Lymphadenopathy:     He has no cervical adenopathy  Neurological: He is alert and oriented to person, place, and time  No cranial nerve deficit  Skin: Skin is warm  No rash noted  No erythema  Psychiatric: He has a normal mood and affect  His behavior is normal    Vitals reviewed  Discussion with Family:  Patient's girlfriend was present at the time of my discharge evaluation, all questions answered to her satisfaction    Discharge instructions/Information to patient and family:   See after visit summary for information provided to patient and family  Provisions for Follow-Up Care:  See after visit summary for information related to follow-up care and any pertinent home health orders  Disposition:     Home with [facility] Services (Reminder: Complete face to face encounter)    For Discharges to [address] [facility]:   · Not Applicable to this Patient - Not Applicable to this Patient    Planned Readmission:    None     Discharge Statement:  I spent 40 minutes discharging the patient  This time was spent on the day of discharge  I had direct contact with the patient on the day of discharge   Greater than 50% of the total time was spent examining patient, answering all patient questions, arranging and discussing plan of care with patient as well as directly providing post-discharge instructions  Additional time then spent on discharge activities  Discharge Medications:  See after visit summary for reconciled discharge medications provided to patient and family        ** Please Note: This note has been constructed using a voice recognition system **

## 2019-09-10 NOTE — DISCHARGE INSTRUCTIONS
Cholecystitis   WHAT YOU NEED TO KNOW:   What is cholecystitis? Cholecystitis is inflammation of your gallbladder  Your gallbladder stores bile, which helps break down the fat that you eat  Your gallbladder also helps remove certain chemicals from your body  You may have a sudden, severe attack (acute cholecystitis) or several mild attacks (chronic cholecystitis)  What causes cholecystitis? · Gallstones    · Gallbladder damage, such as from recent surgery or severe illness    · A bacterial infection    · Narrowing of the bile duct (bile flows from your gallbladder to your intestine through the bile duct)    · One or more tumors  What increases my risk for cholecystitis? · Medicines, such as birth control pills or blood pressure medicines    · Obesity    · Pregnancy    · Rapid weight loss  What are the signs and symptoms of cholecystitis? · Pain in your abdomen, often after you eat a big meal with fatty foods    · Lump on the right side of your abdomen    · Decreased appetite     · Fever or chills    · Nausea or vomiting    · Yellowing of your skin and the whites of your eyes  How is cholecystitis diagnosed? Your healthcare provider will ask you about your signs and symptoms  He will also check your abdomen to find out where it hurts  You may also need any of the following:  · Blood and urine tests  may show what is causing your symptoms  · An ultrasound or CT  may show your gallbladder and if you have one or more gallstones  You may be given contrast liquid to help the gallbladder show up better in the pictures  Tell the healthcare provider if you have ever had an allergic reaction to contrast liquid  How is cholecystitis treated? Your treatment may depend on whether your cholecystitis is mild, moderate, or severe  · Medicines:      ¨ Antibiotics  treat a bacterial infection  ¨ Prescription pain medicine  may be given  Ask your healthcare provider how to take this medicine safely   Some prescription pain medicines contain acetaminophen  Do not take other medicines that contain acetaminophen without talking to your healthcare provider  Too much acetaminophen may cause liver damage  Prescription pain medicine may cause constipation  Ask your healthcare provider how to prevent or treat constipation  ¨ NSAIDs , such as ibuprofen, help decrease swelling, pain, and fever  This medicine is available with or without a doctor's order  NSAIDs can cause stomach bleeding or kidney problems in certain people  If you take blood thinner medicine, always ask your healthcare provider if NSAIDs are safe for you  Always read the medicine label and follow directions  · Cholecystostomy  is a procedure to drain your gallbladder  A hollow needle will be put into your gallbladder through your abdomen  The bile in your gallbladder will be drained through this needle  You may also have a tube inserted in your gallbladder to drain it over several days or weeks  · Cholecystectomy  is surgery to remove your gallbladder  Call 911 for any of the following:   · You have chest pain or trouble breathing  When should I seek immediate care? · You have severe pain in your abdomen  · You urinate less than usual   When should I contact my healthcare provider? · You have a fever or chills  · You have pain when you urinate  · Your skin or eyes turn yellow  · You have questions or concerns about your condition or care  CARE AGREEMENT:   You have the right to help plan your care  Learn about your health condition and how it may be treated  Discuss treatment options with your caregivers to decide what care you want to receive  You always have the right to refuse treatment  The above information is an  only  It is not intended as medical advice for individual conditions or treatments   Talk to your doctor, nurse or pharmacist before following any medical regimen to see if it is safe and effective for you  © 2017 [facility] Information is for End User's use only and may not be sold, redistributed or otherwise used for commercial purposes  All illustrations and images included in CareNotes® are the copyrighted property of [employer]  or [place]

## 2019-09-10 NOTE — PLAN OF CARE
Problem: Prexisting or High Potential for Compromised Skin Integrity  Goal: Skin integrity is maintained or improved  Description  INTERVENTIONS:  - Identify patients at risk for skin breakdown  - Assess and monitor skin integrity  - Assess and monitor nutrition and hydration status  - Monitor labs   - Assess for incontinence   - Turn and reposition patient  - Assist with mobility/ambulation  - Relieve pressure over bony prominences  - Avoid friction and shearing  - Provide appropriate hygiene as needed including keeping skin clean and dry  - Evaluate need for skin moisturizer/barrier cream  - Collaborate with interdisciplinary team   - Patient/family teaching  - Consider wound care consult   Outcome: Completed     Problem: Potential for Falls  Goal: Patient will remain free of falls  Description  INTERVENTIONS:  - Assess patient frequently for physical needs  -  Identify cognitive and physical deficits and behaviors that affect risk of falls    -  [place] fall precautions as indicated by assessment   - Educate patient/family on patient safety including physical limitations  - Instruct patient to call for assistance with activity based on assessment  - Modify environment to reduce risk of injury  - Consider OT/PT consult to assist with strengthening/mobility  Outcome: Completed     Problem: GASTROINTESTINAL - ADULT  Goal: Minimal or absence of nausea and/or vomiting  Description  INTERVENTIONS:  - Administer IV fluids if ordered to ensure adequate hydration  - Maintain NPO status until nausea and vomiting are resolved  - Nasogastric tube if ordered  - Administer ordered antiemetic medications as needed  - Provide nonpharmacologic comfort measures as appropriate  - Advance diet as tolerated, if ordered  - Consider nutrition services referral to assist patient with adequate nutrition and appropriate food choices  Outcome: Completed  Goal: Maintains or returns to baseline bowel function  Description  INTERVENTIONS:  - Assess bowel function  - Encourage oral fluids to ensure adequate hydration  - Administer IV fluids if ordered to ensure adequate hydration  - Administer ordered medications as needed  - Encourage mobilization and activity  - Consider nutritional services referral to assist patient with adequate nutrition and appropriate food choices  Outcome: Completed  Goal: Maintains adequate nutritional intake  Description  INTERVENTIONS:  - Monitor percentage of each meal consumed  - Identify factors contributing to decreased intake, treat as appropriate  - Assist with meals as needed  - Monitor I&O, weight, and lab values if indicated  - Obtain nutrition services referral as needed  Outcome: Completed  Goal: Establish and maintain optimal ostomy function  Description  INTERVENTIONS:  - Assess bowel function  - Encourage oral fluids to ensure adequate hydration  - Administer IV fluids if ordered to ensure adequate hydration   - Administer ordered medications as needed  - Encourage mobilization and activity  - Nutrition services referral to assist patient with appropriate food choices  - Assess stoma site  - Consider wound care consult   Outcome: Completed     Problem: GENITOURINARY - ADULT  Goal: Maintains or returns to baseline urinary function  Description  INTERVENTIONS:  - Assess urinary function  - Encourage oral fluids to ensure adequate hydration if ordered  - Administer IV fluids as ordered to ensure adequate hydration  - Administer ordered medications as needed  - Offer frequent toileting  - Follow urinary retention protocol if ordered  Outcome: Completed  Goal: Absence of urinary retention  Description  INTERVENTIONS:  - Assess patients ability to void and empty bladder  - Monitor I/O  - Bladder scan as needed  - Discuss with physician/AP medications to alleviate retention as needed  - Discuss catheterization for long term situations as appropriate  Outcome: Completed  Goal: Urinary catheter remains patent  Description  INTERVENTIONS:  - Assess patency of urinary catheter  - If patient has a chronic garcia, consider changing catheter if non-functioning  - Follow guidelines for intermittent irrigation of non-functioning urinary catheter  Outcome: Completed     Problem: METABOLIC, FLUID AND ELECTROLYTES - ADULT  Goal: Electrolytes maintained within normal limits  Description  INTERVENTIONS:  - Monitor labs and assess patient for signs and symptoms of electrolyte imbalances  - Administer electrolyte replacement as ordered  - Monitor response to electrolyte replacements, including repeat lab results as appropriate  - Instruct patient on fluid and nutrition as appropriate  Outcome: Completed  Goal: Fluid balance maintained  Description  INTERVENTIONS:  - Monitor labs   - Monitor I/O and WT  - Instruct patient on fluid and nutrition as appropriate  - Assess for signs & symptoms of volume excess or deficit  Outcome: Completed  Goal: Glucose maintained within target range  Description  INTERVENTIONS:  - Monitor Blood Glucose as ordered  - Assess for signs and symptoms of hyperglycemia and hypoglycemia  - Administer ordered medications to maintain glucose within target range  - Assess nutritional intake and initiate nutrition service referral as needed  Outcome: Completed     Problem: SKIN/TISSUE INTEGRITY - ADULT  Goal: Skin integrity remains intact  Description  INTERVENTIONS  - Identify patients at risk for skin breakdown  - Assess and monitor skin integrity  - Assess and monitor nutrition and hydration status  - Monitor labs (i e  albumin)  - Assess for incontinence   - Turn and reposition patient  - Assist with mobility/ambulation  - Relieve pressure over bony prominences  - Avoid friction and shearing  - Provide appropriate hygiene as needed including keeping skin clean and dry  - Evaluate need for skin moisturizer/barrier cream  - Collaborate with interdisciplinary team (i e  Nutrition, Rehabilitation, etc )   - Patient/family teaching  Outcome: Completed  Goal: Incision(s), wounds(s) or drain site(s) healing without S/S of infection  Description  INTERVENTIONS  - Assess and document risk factors for skin impairment   - Assess and document dressing, incision, wound bed, drain sites and surrounding tissue  - Consider nutrition services referral as needed  - Oral mucous membranes remain intact  - Provide patient/ family education  Outcome: Completed  Goal: Oral mucous membranes remain intact  Description  INTERVENTIONS  - Assess oral mucosa and hygiene practices  - Implement preventative oral hygiene regimen  - Implement oral medicated treatments as ordered  - Initiate Nutrition services referral as needed  Outcome: Completed     Problem: PAIN - ADULT  Goal: Verbalizes/displays adequate comfort level or baseline comfort level  Description  Interventions:  - Encourage patient to monitor pain and request assistance  - Assess pain using appropriate pain scale  - Administer analgesics based on type and severity of pain and evaluate response  - Implement non-pharmacological measures as appropriate and evaluate response  - Consider cultural and social influences on pain and pain management  - Notify physician/advanced practitioner if interventions unsuccessful or patient reports new pain  Outcome: Completed     Problem: INFECTION - ADULT  Goal: Absence or prevention of progression during hospitalization  Description  INTERVENTIONS:  - Assess and monitor for signs and symptoms of infection  - Monitor lab/diagnostic results  - Monitor all insertion sites, i e  indwelling lines, tubes, and drains  - Monitor endotracheal if appropriate and nasal secretions for changes in amount and color  - [place] appropriate cooling/warming therapies per order  - Administer medications as ordered  - Instruct and encourage patient and family to use good hand hygiene technique  - Identify and instruct in appropriate isolation precautions for identified infection/condition  Outcome: Completed  Goal: Absence of fever/infection during neutropenic period  Description  INTERVENTIONS:  - Monitor WBC    Outcome: Completed     Problem: SAFETY ADULT  Goal: Maintain or return to baseline ADL function  Description  INTERVENTIONS:  -  Assess patient's ability to carry out ADLs; assess patient's baseline for ADL function and identify physical deficits which impact ability to perform ADLs (bathing, care of mouth/teeth, toileting, grooming, dressing, etc )  - Assess/evaluate cause of self-care deficits   - Assess range of motion  - Assess patient's mobility; develop plan if impaired  - Assess patient's need for assistive devices and provide as appropriate  - Encourage maximum independence but intervene and supervise when necessary  - Involve family in performance of ADLs  - Assess for home care needs following discharge   - Consider OT consult to assist with ADL evaluation and planning for discharge  - Provide patient education as appropriate  Outcome: Completed  Goal: Maintain or return mobility status to optimal level  Description  INTERVENTIONS:  - Assess patient's baseline mobility status (ambulation, transfers, stairs, etc )    - Identify cognitive and physical deficits and behaviors that affect mobility  - Identify mobility aids required to assist with transfers and/or ambulation (gait belt, sit-to-stand, lift, walker, cane, etc )  - [place] fall precautions as indicated by assessment  - Record patient progress and toleration of activity level on Mobility SBAR; progress patient to next Phase/Stage  - Instruct patient to call for assistance with activity based on assessment  - Consider rehabilitation consult to assist with strengthening/weightbearing, etc   Outcome: Completed     Problem: DISCHARGE PLANNING  Goal: Discharge to home or other facility with appropriate resources  Description  INTERVENTIONS:  - Identify barriers to discharge w/patient and caregiver  - Arrange for needed discharge resources and transportation as appropriate  - Identify discharge learning needs (meds, wound care, etc )  - Arrange for interpretive services to assist at discharge as needed  - Refer to Case Management Department for coordinating discharge planning if the patient needs post-hospital services based on physician/advanced practitioner order or complex needs related to functional status, cognitive ability, or social support system  Outcome: Completed     Problem: Knowledge Deficit  Goal: Patient/family/caregiver demonstrates understanding of disease process, treatment plan, medications, and discharge instructions  Description  Complete learning assessment and assess knowledge base    Interventions:  - Provide teaching at level of understanding  - Provide teaching via preferred learning methods  Outcome: Completed     Problem: DISCHARGE PLANNING - CARE MANAGEMENT  Goal: Discharge to post-acute care or home with appropriate resources  Description  INTERVENTIONS:  - Conduct assessment to determine patient/family and health care team treatment goals, and need for post-acute services based on payer coverage, community resources, and patient preferences, and barriers to discharge  - Address psychosocial, clinical, and financial barriers to discharge as identified in assessment in conjunction with the patient/family and health care team  - Arrange appropriate level of post-acute services according to patient's   needs and preference and payer coverage in collaboration with the physician and health care team  - Communicate with and update the patient/family, physician, and health care team regarding progress on the discharge plan  - Arrange appropriate transportation to post-acute venues    Pt's goal is to return home with  and hhc, pt is active with [facility], pt needs to be assessed by pt dept   Outcome: Completed     Problem: Nutrition/Hydration-ADULT  Goal: Nutrient/Hydration intake appropriate for improving, restoring or maintaining nutritional needs  Description  Monitor and assess patient's nutrition/hydration status for malnutrition  Collaborate with interdisciplinary team and initiate plan and interventions as ordered  Monitor patient's weight and dietary intake as ordered or per policy  Utilize nutrition screening tool and intervene as necessary  Determine patient's food preferences and provide high-protein, high-caloric foods as appropriate       INTERVENTIONS:  - Monitor oral intake, urinary output, labs, and treatment plans  - Assess nutrition and hydration status and recommend course of action  - Evaluate amount of meals eaten  - Assist patient with eating if necessary   - Allow adequate time for meals  - Recommend/ encourage appropriate diets, oral nutritional supplements, and vitamin/mineral supplements  - Order, calculate, and assess calorie counts as needed  - Recommend, monitor, and adjust tube feedings and TPN/PPN based on assessed needs  - Assess need for intravenous fluids  - Provide specific nutrition/hydration education as appropriate  - Include patient/family/caregiver in decisions related to nutrition  Outcome: Completed

## 2019-09-10 NOTE — PLAN OF CARE
Problem: Prexisting or High Potential for Compromised Skin Integrity  Goal: Skin integrity is maintained or improved  Description  INTERVENTIONS:  - Identify patients at risk for skin breakdown  - Assess and monitor skin integrity  - Assess and monitor nutrition and hydration status  - Monitor labs   - Assess for incontinence   - Turn and reposition patient  - Assist with mobility/ambulation  - Relieve pressure over bony prominences  - Avoid friction and shearing  - Provide appropriate hygiene as needed including keeping skin clean and dry  - Evaluate need for skin moisturizer/barrier cream  - Collaborate with interdisciplinary team   - Patient/family teaching  - Consider wound care consult   Outcome: Progressing     Problem: Potential for Falls  Goal: Patient will remain free of falls  Description  INTERVENTIONS:  - Assess patient frequently for physical needs  -  Identify cognitive and physical deficits and behaviors that affect risk of falls    -  [place] fall precautions as indicated by assessment   - Educate patient/family on patient safety including physical limitations  - Instruct patient to call for assistance with activity based on assessment  - Modify environment to reduce risk of injury  - Consider OT/PT consult to assist with strengthening/mobility  Outcome: Progressing     Problem: GASTROINTESTINAL - ADULT  Goal: Minimal or absence of nausea and/or vomiting  Description  INTERVENTIONS:  - Administer IV fluids if ordered to ensure adequate hydration  - Maintain NPO status until nausea and vomiting are resolved  - Nasogastric tube if ordered  - Administer ordered antiemetic medications as needed  - Provide nonpharmacologic comfort measures as appropriate  - Advance diet as tolerated, if ordered  - Consider nutrition services referral to assist patient with adequate nutrition and appropriate food choices  Outcome: Progressing  Goal: Maintains or returns to baseline bowel function  Description  INTERVENTIONS:  - Assess bowel function  - Encourage oral fluids to ensure adequate hydration  - Administer IV fluids if ordered to ensure adequate hydration  - Administer ordered medications as needed  - Encourage mobilization and activity  - Consider nutritional services referral to assist patient with adequate nutrition and appropriate food choices  Outcome: Progressing  Goal: Maintains adequate nutritional intake  Description  INTERVENTIONS:  - Monitor percentage of each meal consumed  - Identify factors contributing to decreased intake, treat as appropriate  - Assist with meals as needed  - Monitor I&O, weight, and lab values if indicated  - Obtain nutrition services referral as needed  Outcome: Progressing  Goal: Establish and maintain optimal ostomy function  Description  INTERVENTIONS:  - Assess bowel function  - Encourage oral fluids to ensure adequate hydration  - Administer IV fluids if ordered to ensure adequate hydration   - Administer ordered medications as needed  - Encourage mobilization and activity  - Nutrition services referral to assist patient with appropriate food choices  - Assess stoma site  - Consider wound care consult   Outcome: Progressing     Problem: GENITOURINARY - ADULT  Goal: Maintains or returns to baseline urinary function  Description  INTERVENTIONS:  - Assess urinary function  - Encourage oral fluids to ensure adequate hydration if ordered  - Administer IV fluids as ordered to ensure adequate hydration  - Administer ordered medications as needed  - Offer frequent toileting  - Follow urinary retention protocol if ordered  Outcome: Progressing  Goal: Absence of urinary retention  Description  INTERVENTIONS:  - Assess patients ability to void and empty bladder  - Monitor I/O  - Bladder scan as needed  - Discuss with physician/AP medications to alleviate retention as needed  - Discuss catheterization for long term situations as appropriate  Outcome: Progressing  Goal: Urinary catheter remains patent  Description  INTERVENTIONS:  - Assess patency of urinary catheter  - If patient has a chronic garcia, consider changing catheter if non-functioning  - Follow guidelines for intermittent irrigation of non-functioning urinary catheter  Outcome: Progressing     Problem: METABOLIC, FLUID AND ELECTROLYTES - ADULT  Goal: Electrolytes maintained within normal limits  Description  INTERVENTIONS:  - Monitor labs and assess patient for signs and symptoms of electrolyte imbalances  - Administer electrolyte replacement as ordered  - Monitor response to electrolyte replacements, including repeat lab results as appropriate  - Instruct patient on fluid and nutrition as appropriate  Outcome: Progressing  Goal: Fluid balance maintained  Description  INTERVENTIONS:  - Monitor labs   - Monitor I/O and WT  - Instruct patient on fluid and nutrition as appropriate  - Assess for signs & symptoms of volume excess or deficit  Outcome: Progressing  Goal: Glucose maintained within target range  Description  INTERVENTIONS:  - Monitor Blood Glucose as ordered  - Assess for signs and symptoms of hyperglycemia and hypoglycemia  - Administer ordered medications to maintain glucose within target range  - Assess nutritional intake and initiate nutrition service referral as needed  Outcome: Progressing     Problem: SKIN/TISSUE INTEGRITY - ADULT  Goal: Skin integrity remains intact  Description  INTERVENTIONS  - Identify patients at risk for skin breakdown  - Assess and monitor skin integrity  - Assess and monitor nutrition and hydration status  - Monitor labs (i e  albumin)  - Assess for incontinence   - Turn and reposition patient  - Assist with mobility/ambulation  - Relieve pressure over bony prominences  - Avoid friction and shearing  - Provide appropriate hygiene as needed including keeping skin clean and dry  - Evaluate need for skin moisturizer/barrier cream  - Collaborate with interdisciplinary team (i e  Nutrition, Rehabilitation, etc )   - Patient/family teaching  Outcome: Progressing  Goal: Incision(s), wounds(s) or drain site(s) healing without S/S of infection  Description  INTERVENTIONS  - Assess and document risk factors for skin impairment   - Assess and document dressing, incision, wound bed, drain sites and surrounding tissue  - Consider nutrition services referral as needed  - Oral mucous membranes remain intact  - Provide patient/ family education  Outcome: Progressing  Goal: Oral mucous membranes remain intact  Description  INTERVENTIONS  - Assess oral mucosa and hygiene practices  - Implement preventative oral hygiene regimen  - Implement oral medicated treatments as ordered  - Initiate Nutrition services referral as needed  Outcome: Progressing     Problem: PAIN - ADULT  Goal: Verbalizes/displays adequate comfort level or baseline comfort level  Description  Interventions:  - Encourage patient to monitor pain and request assistance  - Assess pain using appropriate pain scale  - Administer analgesics based on type and severity of pain and evaluate response  - Implement non-pharmacological measures as appropriate and evaluate response  - Consider cultural and social influences on pain and pain management  - Notify physician/advanced practitioner if interventions unsuccessful or patient reports new pain  Outcome: Progressing     Problem: INFECTION - ADULT  Goal: Absence or prevention of progression during hospitalization  Description  INTERVENTIONS:  - Assess and monitor for signs and symptoms of infection  - Monitor lab/diagnostic results  - Monitor all insertion sites, i e  indwelling lines, tubes, and drains  - Monitor endotracheal if appropriate and nasal secretions for changes in amount and color  - [place] appropriate cooling/warming therapies per order  - Administer medications as ordered  - Instruct and encourage patient and family to use good hand hygiene technique  - Identify and instruct in appropriate isolation precautions for identified infection/condition  Outcome: Progressing  Goal: Absence of fever/infection during neutropenic period  Description  INTERVENTIONS:  - Monitor WBC    Outcome: Progressing     Problem: SAFETY ADULT  Goal: Maintain or return to baseline ADL function  Description  INTERVENTIONS:  -  Assess patient's ability to carry out ADLs; assess patient's baseline for ADL function and identify physical deficits which impact ability to perform ADLs (bathing, care of mouth/teeth, toileting, grooming, dressing, etc )  - Assess/evaluate cause of self-care deficits   - Assess range of motion  - Assess patient's mobility; develop plan if impaired  - Assess patient's need for assistive devices and provide as appropriate  - Encourage maximum independence but intervene and supervise when necessary  - Involve family in performance of ADLs  - Assess for home care needs following discharge   - Consider OT consult to assist with ADL evaluation and planning for discharge  - Provide patient education as appropriate  Outcome: Progressing  Goal: Maintain or return mobility status to optimal level  Description  INTERVENTIONS:  - Assess patient's baseline mobility status (ambulation, transfers, stairs, etc )    - Identify cognitive and physical deficits and behaviors that affect mobility  - Identify mobility aids required to assist with transfers and/or ambulation (gait belt, sit-to-stand, lift, walker, cane, etc )  - [place] fall precautions as indicated by assessment  - Record patient progress and toleration of activity level on Mobility SBAR; progress patient to next Phase/Stage  - Instruct patient to call for assistance with activity based on assessment  - Consider rehabilitation consult to assist with strengthening/weightbearing, etc   Outcome: Progressing     Problem: DISCHARGE PLANNING  Goal: Discharge to home or other facility with appropriate resources  Description  INTERVENTIONS:  - Identify barriers to discharge w/patient and caregiver  - Arrange for needed discharge resources and transportation as appropriate  - Identify discharge learning needs (meds, wound care, etc )  - Arrange for interpretive services to assist at discharge as needed  - Refer to Case Management Department for coordinating discharge planning if the patient needs post-hospital services based on physician/advanced practitioner order or complex needs related to functional status, cognitive ability, or social support system  Outcome: Progressing     Problem: Knowledge Deficit  Goal: Patient/family/caregiver demonstrates understanding of disease process, treatment plan, medications, and discharge instructions  Description  Complete learning assessment and assess knowledge base    Interventions:  - Provide teaching at level of understanding  - Provide teaching via preferred learning methods  Outcome: Progressing     Problem: DISCHARGE PLANNING - CARE MANAGEMENT  Goal: Discharge to post-acute care or home with appropriate resources  Description  INTERVENTIONS:  - Conduct assessment to determine patient/family and health care team treatment goals, and need for post-acute services based on payer coverage, community resources, and patient preferences, and barriers to discharge  - Address psychosocial, clinical, and financial barriers to discharge as identified in assessment in conjunction with the patient/family and health care team  - Arrange appropriate level of post-acute services according to patient's   needs and preference and payer coverage in collaboration with the physician and health care team  - Communicate with and update the patient/family, physician, and health care team regarding progress on the discharge plan  - Arrange appropriate transportation to post-acute venues    Pt's goal is to return home with  and [facility], pt is active with [facility], pt needs to be assessed by pt dept   Outcome: Progressing     Problem: Nutrition/Hydration-ADULT  Goal: Nutrient/Hydration intake appropriate for improving, restoring or maintaining nutritional needs  Description  Monitor and assess patient's nutrition/hydration status for malnutrition  Collaborate with interdisciplinary team and initiate plan and interventions as ordered  Monitor patient's weight and dietary intake as ordered or per policy  Utilize nutrition screening tool and intervene as necessary  Determine patient's food preferences and provide high-protein, high-caloric foods as appropriate       INTERVENTIONS:  - Monitor oral intake, urinary output, labs, and treatment plans  - Assess nutrition and hydration status and recommend course of action  - Evaluate amount of meals eaten  - Assist patient with eating if necessary   - Allow adequate time for meals  - Recommend/ encourage appropriate diets, oral nutritional supplements, and vitamin/mineral supplements  - Order, calculate, and assess calorie counts as needed  - Recommend, monitor, and adjust tube feedings and TPN/PPN based on assessed needs  - Assess need for intravenous fluids  - Provide specific nutrition/hydration education as appropriate  - Include patient/family/caregiver in decisions related to nutrition  Outcome: Progressing

## 2019-09-15 ENCOUNTER — APPOINTMENT (EMERGENCY)
Dept: CT IMAGING | Facility: HOSPITAL | Age: 79
DRG: 683 | End: 2019-09-15
Payer: COMMERCIAL

## 2019-09-15 ENCOUNTER — HOSPITAL ENCOUNTER (INPATIENT)
Facility: HOSPITAL | Age: 79
LOS: 3 days | Discharge: NURSING, REHAB OR LONG-TERM CARE | DRG: 683 | End: 2019-09-18
Attending: EMERGENCY MEDICINE | Admitting: INTERNAL MEDICINE
Payer: COMMERCIAL

## 2019-09-15 DIAGNOSIS — R53.1 GENERALIZED WEAKNESS: Primary | ICD-10-CM

## 2019-09-15 DIAGNOSIS — E87.2 METABOLIC ACIDOSIS: ICD-10-CM

## 2019-09-15 DIAGNOSIS — N39.0 UTI (URINARY TRACT INFECTION): ICD-10-CM

## 2019-09-15 DIAGNOSIS — N18.9 ACUTE ON CHRONIC RENAL FAILURE (HCC): ICD-10-CM

## 2019-09-15 DIAGNOSIS — R10.9 ABDOMINAL PAIN: ICD-10-CM

## 2019-09-15 DIAGNOSIS — N17.9 ACUTE ON CHRONIC RENAL FAILURE (HCC): ICD-10-CM

## 2019-09-15 DIAGNOSIS — K83.8 BILIARY SLUDGE: ICD-10-CM

## 2019-09-15 PROBLEM — M25.552 LEFT HIP PAIN: Status: RESOLVED | Noted: 2019-05-03 | Resolved: 2019-09-15

## 2019-09-15 PROBLEM — M25.562 ACUTE PAIN OF LEFT KNEE: Status: RESOLVED | Noted: 2019-04-02 | Resolved: 2019-09-15

## 2019-09-15 PROBLEM — R78.81 GRAM-NEGATIVE BACTEREMIA: Status: RESOLVED | Noted: 2019-04-29 | Resolved: 2019-09-15

## 2019-09-15 PROBLEM — R55 SYNCOPE AND COLLAPSE: Chronic | Status: RESOLVED | Noted: 2019-01-27 | Resolved: 2019-09-15

## 2019-09-15 LAB
ALBUMIN SERPL BCP-MCNC: 3.3 G/DL (ref 3.5–5.7)
ALP SERPL-CCNC: 185 U/L (ref 55–165)
ALT SERPL W P-5'-P-CCNC: 14 U/L (ref 7–52)
ANION GAP SERPL CALCULATED.3IONS-SCNC: 13 MMOL/L (ref 4–13)
AST SERPL W P-5'-P-CCNC: 10 U/L (ref 13–39)
BACTERIA UR QL AUTO: ABNORMAL /HPF
BASOPHILS # BLD AUTO: 0.1 THOUSANDS/ΜL (ref 0–0.1)
BASOPHILS NFR BLD AUTO: 1 % (ref 0–2)
BILIRUB SERPL-MCNC: 0.4 MG/DL (ref 0.2–1)
BILIRUB UR QL STRIP: NEGATIVE
BUN SERPL-MCNC: 59 MG/DL (ref 7–25)
CALCIUM SERPL-MCNC: 8.8 MG/DL (ref 8.6–10.5)
CHLORIDE SERPL-SCNC: 105 MMOL/L (ref 98–107)
CLARITY UR: ABNORMAL
CO2 SERPL-SCNC: 16 MMOL/L (ref 21–31)
COLOR UR: YELLOW
CREAT SERPL-MCNC: 3.12 MG/DL (ref 0.7–1.3)
EOSINOPHIL # BLD AUTO: 0.1 THOUSAND/ΜL (ref 0–0.61)
EOSINOPHIL NFR BLD AUTO: 1 % (ref 0–5)
ERYTHROCYTE [DISTWIDTH] IN BLOOD BY AUTOMATED COUNT: 14.3 % (ref 11.5–14.5)
GFR SERPL CREATININE-BSD FRML MDRD: 18 ML/MIN/1.73SQ M
GLUCOSE SERPL-MCNC: 149 MG/DL (ref 65–140)
GLUCOSE SERPL-MCNC: 205 MG/DL (ref 65–99)
GLUCOSE UR STRIP-MCNC: NEGATIVE MG/DL
HCT VFR BLD AUTO: 33.4 % (ref 42–47)
HGB BLD-MCNC: 11 G/DL (ref 14–18)
HGB UR QL STRIP.AUTO: ABNORMAL
KETONES UR STRIP-MCNC: NEGATIVE MG/DL
LACTATE SERPL-SCNC: 1.8 MMOL/L (ref 0.5–2)
LEUKOCYTE ESTERASE UR QL STRIP: ABNORMAL
LIPASE SERPL-CCNC: 138 U/L (ref 11–82)
LYMPHOCYTES # BLD AUTO: 1.4 THOUSANDS/ΜL (ref 0.6–4.47)
LYMPHOCYTES NFR BLD AUTO: 10 % (ref 21–51)
MCH RBC QN AUTO: 29.2 PG (ref 26–34)
MCHC RBC AUTO-ENTMCNC: 32.9 G/DL (ref 31–37)
MCV RBC AUTO: 89 FL (ref 81–99)
MONOCYTES # BLD AUTO: 1 THOUSAND/ΜL (ref 0.17–1.22)
MONOCYTES NFR BLD AUTO: 7 % (ref 2–12)
NEUTROPHILS # BLD AUTO: 11.7 THOUSANDS/ΜL (ref 1.4–6.5)
NEUTS SEG NFR BLD AUTO: 82 % (ref 42–75)
NITRITE UR QL STRIP: POSITIVE
NON-SQ EPI CELLS URNS QL MICRO: ABNORMAL /HPF
OTHER STN SPEC: ABNORMAL
PH UR STRIP.AUTO: 5 [PH]
PLATELET # BLD AUTO: 266 THOUSANDS/UL (ref 149–390)
PMV BLD AUTO: 7.8 FL (ref 8.6–11.7)
POTASSIUM SERPL-SCNC: 4.3 MMOL/L (ref 3.5–5.5)
PROT SERPL-MCNC: 6.8 G/DL (ref 6.4–8.9)
PROT UR STRIP-MCNC: ABNORMAL MG/DL
RBC # BLD AUTO: 3.77 MILLION/UL (ref 4.3–5.9)
RBC #/AREA URNS AUTO: ABNORMAL /HPF
SODIUM SERPL-SCNC: 134 MMOL/L (ref 134–143)
SP GR UR STRIP.AUTO: 1.02 (ref 1–1.03)
UROBILINOGEN UR QL STRIP.AUTO: 0.2 E.U./DL
WBC # BLD AUTO: 14.3 THOUSAND/UL (ref 4.8–10.8)
WBC #/AREA URNS AUTO: ABNORMAL /HPF

## 2019-09-15 PROCEDURE — 36415 COLL VENOUS BLD VENIPUNCTURE: CPT | Performed by: PHYSICIAN ASSISTANT

## 2019-09-15 PROCEDURE — 87186 SC STD MICRODIL/AGAR DIL: CPT | Performed by: PHYSICIAN ASSISTANT

## 2019-09-15 PROCEDURE — 99285 EMERGENCY DEPT VISIT HI MDM: CPT

## 2019-09-15 PROCEDURE — 87040 BLOOD CULTURE FOR BACTERIA: CPT | Performed by: PHYSICIAN ASSISTANT

## 2019-09-15 PROCEDURE — 82948 REAGENT STRIP/BLOOD GLUCOSE: CPT

## 2019-09-15 PROCEDURE — 83690 ASSAY OF LIPASE: CPT | Performed by: PHYSICIAN ASSISTANT

## 2019-09-15 PROCEDURE — 80053 COMPREHEN METABOLIC PANEL: CPT | Performed by: PHYSICIAN ASSISTANT

## 2019-09-15 PROCEDURE — 99223 1ST HOSP IP/OBS HIGH 75: CPT | Performed by: PHYSICIAN ASSISTANT

## 2019-09-15 PROCEDURE — 74176 CT ABD & PELVIS W/O CONTRAST: CPT

## 2019-09-15 PROCEDURE — 84145 PROCALCITONIN (PCT): CPT | Performed by: PHYSICIAN ASSISTANT

## 2019-09-15 PROCEDURE — 83605 ASSAY OF LACTIC ACID: CPT | Performed by: FAMILY MEDICINE

## 2019-09-15 PROCEDURE — 81001 URINALYSIS AUTO W/SCOPE: CPT | Performed by: PHYSICIAN ASSISTANT

## 2019-09-15 PROCEDURE — 87077 CULTURE AEROBIC IDENTIFY: CPT | Performed by: PHYSICIAN ASSISTANT

## 2019-09-15 PROCEDURE — 87181 SC STD AGAR DILUTION PER AGT: CPT | Performed by: PHYSICIAN ASSISTANT

## 2019-09-15 PROCEDURE — 85025 COMPLETE CBC W/AUTO DIFF WBC: CPT | Performed by: PHYSICIAN ASSISTANT

## 2019-09-15 PROCEDURE — 87086 URINE CULTURE/COLONY COUNT: CPT | Performed by: PHYSICIAN ASSISTANT

## 2019-09-15 RX ORDER — AMLODIPINE BESYLATE 5 MG/1
10 TABLET ORAL DAILY
Status: DISCONTINUED | OUTPATIENT
Start: 2019-09-16 | End: 2019-09-18 | Stop reason: HOSPADM

## 2019-09-15 RX ORDER — SODIUM CHLORIDE 9 MG/ML
125 INJECTION, SOLUTION INTRAVENOUS CONTINUOUS
Status: DISCONTINUED | OUTPATIENT
Start: 2019-09-15 | End: 2019-09-17

## 2019-09-15 RX ORDER — OXYCODONE HYDROCHLORIDE AND ACETAMINOPHEN 5; 325 MG/1; MG/1
1 TABLET ORAL EVERY 6 HOURS PRN
Status: DISCONTINUED | OUTPATIENT
Start: 2019-09-15 | End: 2019-09-18 | Stop reason: HOSPADM

## 2019-09-15 RX ORDER — ONDANSETRON 2 MG/ML
4 INJECTION INTRAMUSCULAR; INTRAVENOUS EVERY 6 HOURS PRN
Status: DISCONTINUED | OUTPATIENT
Start: 2019-09-15 | End: 2019-09-18 | Stop reason: HOSPADM

## 2019-09-15 RX ORDER — COLCHICINE 0.6 MG/1
0.6 TABLET ORAL DAILY
Status: DISCONTINUED | OUTPATIENT
Start: 2019-09-16 | End: 2019-09-18 | Stop reason: HOSPADM

## 2019-09-15 RX ORDER — PANTOPRAZOLE SODIUM 40 MG/1
40 TABLET, DELAYED RELEASE ORAL
Status: DISCONTINUED | OUTPATIENT
Start: 2019-09-16 | End: 2019-09-18 | Stop reason: HOSPADM

## 2019-09-15 RX ORDER — CHOLECALCIFEROL (VITAMIN D3) 10 MCG
1 TABLET ORAL
Status: DISCONTINUED | OUTPATIENT
Start: 2019-09-16 | End: 2019-09-18 | Stop reason: HOSPADM

## 2019-09-15 RX ORDER — ASPIRIN 81 MG/1
81 TABLET, CHEWABLE ORAL DAILY
Status: DISCONTINUED | OUTPATIENT
Start: 2019-09-16 | End: 2019-09-18 | Stop reason: HOSPADM

## 2019-09-15 RX ORDER — TAMSULOSIN HYDROCHLORIDE 0.4 MG/1
0.4 CAPSULE ORAL
Status: DISCONTINUED | OUTPATIENT
Start: 2019-09-16 | End: 2019-09-18 | Stop reason: HOSPADM

## 2019-09-15 RX ORDER — HEPARIN SODIUM 5000 [USP'U]/ML
5000 INJECTION, SOLUTION INTRAVENOUS; SUBCUTANEOUS EVERY 8 HOURS SCHEDULED
Status: DISCONTINUED | OUTPATIENT
Start: 2019-09-15 | End: 2019-09-18 | Stop reason: HOSPADM

## 2019-09-15 RX ORDER — MELATONIN
1000 DAILY
Status: DISCONTINUED | OUTPATIENT
Start: 2019-09-16 | End: 2019-09-18 | Stop reason: HOSPADM

## 2019-09-15 RX ORDER — ACETAMINOPHEN 325 MG/1
650 TABLET ORAL EVERY 6 HOURS PRN
Status: DISCONTINUED | OUTPATIENT
Start: 2019-09-15 | End: 2019-09-18 | Stop reason: HOSPADM

## 2019-09-15 RX ORDER — METHENAMINE HIPPURATE 1000 MG/1
1 TABLET ORAL 2 TIMES DAILY
Status: DISCONTINUED | OUTPATIENT
Start: 2019-09-15 | End: 2019-09-18 | Stop reason: HOSPADM

## 2019-09-15 RX ADMIN — METHENAMINE HIPPURATE 1 G: 1 TABLET ORAL at 21:04

## 2019-09-15 RX ADMIN — ERTAPENEM SODIUM 500 MG: 1 INJECTION, POWDER, LYOPHILIZED, FOR SOLUTION INTRAMUSCULAR; INTRAVENOUS at 18:47

## 2019-09-15 RX ADMIN — SODIUM CHLORIDE 125 ML/HR: 9 INJECTION, SOLUTION INTRAVENOUS at 16:08

## 2019-09-15 RX ADMIN — HEPARIN SODIUM 5000 UNITS: 5000 INJECTION INTRAVENOUS; SUBCUTANEOUS at 21:04

## 2019-09-15 RX ADMIN — INSULIN DETEMIR 10 UNITS: 100 INJECTION, SOLUTION SUBCUTANEOUS at 23:02

## 2019-09-15 RX ADMIN — SODIUM CHLORIDE 125 ML/HR: 9 INJECTION, SOLUTION INTRAVENOUS at 18:45

## 2019-09-15 NOTE — ED PROVIDER NOTES
History  Chief Complaint   Patient presents with    Weakness - Generalized     Pt states that he has generalized weakness, states that it never ended and has been going on for some time now; recent surgery for gallbladder tube within 2 weeks according to pt       History provided by:  Patient   used: No     This 50-year-old male status post percutaneous cholecystectomy on September 6, 2019 by Dr Keysha Tristan was discharged home last Wednesday presented ED with complain of generalized weakness by states that he has been weak since he left the hospital having difficulty getting out of bed  Patient had ATN while he was in the hospital and was scheduled to follow up with the Urology and renal   Patient is complaining of abdominal pain but denies any nausea vomiting at this time  Patient  is denying any fever chills  Sitting comfortably in bed answer questions appropriately  Patient has chronic Haile catheter  Prior to Admission Medications   Prescriptions Last Dose Informant Patient Reported? Taking?    amLODIPine (NORVASC) 10 mg tablet   Yes No   Sig: Take 10 mg by mouth daily   aspirin 81 MG tablet  Self Yes No   Sig: Take 81 mg by mouth daily   b complex-vitamin C-folic acid (NEPHROCAPS) 1 mg capsule   No No   Sig: Take 1 capsule by mouth daily with dinner   cholecalciferol (VITAMIN D3) 1,000 units tablet   Yes No   Sig: Take 1,000 Units by mouth daily   colchicine (COLCRYS) 0 6 mg tablet   Yes No   Sig: Take 0 6 mg by mouth daily   insulin detemir (LEVEMIR) 100 units/mL subcutaneous injection   Yes No   Sig: Inject 10 Units under the skin daily at bedtime   insulin lispro (HumaLOG) 100 units/mL injection   Yes No   Sig: Inject under the skin 3 (three) times a day before meals   levofloxacin (LEVAQUIN) 250 mg tablet   No No   Sig: Take 1 tablet (250 mg total) by mouth every 24 hours for 5 days   methenamine hippurate (HIPREX) 1 g tablet   Yes No   Sig: Take 1 g by mouth 2 (two) times a day with meals   oxyCODONE-acetaminophen (PERCOCET) 5-325 mg per tablet   No No   Sig: Take 1 tablet by mouth every 6 (six) hours as needed for moderate pain for up to 10 daysMax Daily Amount: 4 tablets   pantoprazole (PROTONIX) 40 mg tablet  Self No No   Sig: Take 1 tablet (40 mg total) by mouth daily in the early morning   tamsulosin (FLOMAX) 0 4 mg  Self No No   Sig: Take 1 capsule (0 4 mg total) by mouth daily with dinner      Facility-Administered Medications: None       Past Medical History:   Diagnosis Date    BPH (benign prostatic hyperplasia)     Cancer ([facility] )     liver cancer    Cardiac disease     Coronary artery disease     Diabetes mellitus ([place] [address] )     DJD (degenerative joint disease)     Gait disturbance     Generalized weakness     GERD (gastroesophageal reflux disease)     Gout     Hyperlipidemia     Hypertension     Renal disorder        Past Surgical History:   Procedure Laterality Date    CORONARY ANGIOPLASTY WITH STENT PLACEMENT      IR IMAGE GUIDED BIOPSY/ASPIRATION LIVER  1/24/2019    IR TUBE PLACEMENT ROQUE  9/6/2019       Family History   Problem Relation Age of Onset    Cancer Mother     Hypertension Father     Cancer Brother     Cancer Maternal Grandmother     Cancer Paternal Aunt      I have reviewed and agree with the history as documented  Social History     Tobacco Use    Smoking status: Never Smoker    Smokeless tobacco: Never Used   Substance Use Topics    Alcohol use: Never     Frequency: Never    Drug use: Never        Review of Systems   Constitutional: Negative  HENT: Negative  Respiratory: Negative  Cardiovascular: Negative  Gastrointestinal: Negative  Genitourinary: Negative  Neurological: Positive for weakness  Physical Exam  Physical Exam   Constitutional: He is oriented to person, place, and time  He appears well-developed and well-nourished  HENT:   Head: Normocephalic and atraumatic     Cardiovascular: Normal rate, regular rhythm and normal heart sounds  Pulmonary/Chest: Effort normal and breath sounds normal    Abdominal: Soft  There is tenderness (Lower abdomen)  Pt has drain bag present   Neurological: He is alert and oriented to person, place, and time  Skin: Skin is warm  Nursing note and vitals reviewed  Vital Signs  ED Triage Vitals [09/15/19 1541]   Temperature Pulse Respirations Blood Pressure SpO2   97 9 °F (36 6 °C) 93 18 152/74 100 %      Temp Source Heart Rate Source Patient Position - Orthostatic VS BP Location FiO2 (%)   Temporal Monitor Lying Left arm --      Pain Score       No Pain           Vitals:    09/15/19 1541   BP: 152/74   Pulse: 93   Patient Position - Orthostatic VS: Lying         Visual Acuity      ED Medications  Medications   sodium chloride 0 9 % infusion (125 mL/hr Intravenous New Bag 9/15/19 1608)   ceftazidime (FORTAZ) 2 25 mg for Intravitreal Injection Only 0 1 mL (has no administration in time range)       Diagnostic Studies  Results Reviewed     Procedure Component Value Units Date/Time    Lactic acid, plasma [[id number]]  (Normal) Collected:  09/15/19 1606    Lab Status:  Final result Specimen:  Blood from Hand, Left Updated:  09/15/19 1638     LACTIC ACID 1 8 mmol/L     Narrative:       Result may be elevated if tourniquet was used during collection      Lipase [[id number]]  (Abnormal) Collected:  09/15/19 1606    Lab Status:  Final result Specimen:  Blood from Hand, Left Updated:  09/15/19 1638     Lipase 138 u/L     Comprehensive metabolic panel [[id number]]  (Abnormal) Collected:  09/15/19 1606    Lab Status:  Final result Specimen:  Blood from Hand, Left Updated:  09/15/19 1638     Sodium 134 mmol/L      Potassium 4 3 mmol/L      Chloride 105 mmol/L      CO2 16 mmol/L      ANION GAP 13 mmol/L      BUN 59 mg/dL      Creatinine 3 12 mg/dL      Glucose 205 mg/dL      Calcium 8 8 mg/dL      AST 10 U/L      ALT 14 U/L      Alkaline Phosphatase 185 U/L      Total Protein 6 8 g/dL Albumin 3 3 g/dL      Total Bilirubin 0 40 mg/dL      eGFR 18 ml/min/1 73sq m     Narrative:       [place] guidelines for Chronic Kidney Disease (CKD):     Stage 1 with normal or high GFR (GFR > 90 mL/min/1 73 square meters)    Stage 2 Mild CKD (GFR = 60-89 mL/min/1 73 square meters)    Stage 3A Moderate CKD (GFR = 45-59 mL/min/1 73 square meters)    Stage 3B Moderate CKD (GFR = 30-44 mL/min/1 73 square meters)    Stage 4 Severe CKD (GFR = 15-29 mL/min/1 73 square meters)    Stage 5 End Stage CKD (GFR <15 mL/min/1 73 square meters)  Note: GFR calculation is accurate only with a steady state creatinine    Urine Microscopic [[id number]]  (Abnormal) Collected:  09/15/19 1607    Lab Status:  Final result Specimen:  Urine, Indwelling Haile Catheter Updated:  09/15/19 1626     RBC, UA 1-2 /hpf      WBC, UA Innumerable /hpf      Epithelial Cells None Seen /hpf      Bacteria, UA Innumerable /hpf      OTHER OBSERVATIONS Yeast Cells Present    Urine culture [[id number]] Collected:  09/15/19 1607    Lab Status:   In process Specimen:  Urine, Indwelling Haile Catheter Updated:  09/15/19 1626    UA w Reflex to Microscopic w Reflex to Culture [[id number]]  (Abnormal) Collected:  09/15/19 1607    Lab Status:  Final result Specimen:  Urine, Indwelling Haile Catheter Updated:  09/15/19 1615     Color, UA Yellow     Clarity, UA Cloudy     Specific [place], UA 1 020     pH, UA 5 0     Leukocytes, UA 3+     Nitrite, UA Positive     Protein, UA 1+ mg/dl      Glucose, UA Negative mg/dl      Ketones, UA Negative mg/dl      Urobilinogen, UA 0 2 E U /dl      Bilirubin, UA Negative     Blood, UA 2+    CBC and differential [[id number]]  (Abnormal) Collected:  09/15/19 0884    Lab Status:  Final result Specimen:  Blood from Hand, Right Updated:  09/15/19 1601     WBC 14 30 Thousand/uL      RBC 3 77 Million/uL      Hemoglobin 11 0 g/dL      Hematocrit 33 4 %      MCV 89 fL      MCH 29 2 pg      MCHC 32 9 g/dL RDW 14 3 %      MPV 7 8 fL      Platelets 901 Thousands/uL      Neutrophils Relative 82 %      Lymphocytes Relative 10 %      Monocytes Relative 7 %      Eosinophils Relative 1 %      Basophils Relative 1 %      Neutrophils Absolute 11 70 Thousands/µL      Lymphocytes Absolute 1 40 Thousands/µL      Monocytes Absolute 1 00 Thousand/µL      Eosinophils Absolute 0 10 Thousand/µL      Basophils Absolute 0 10 Thousands/µL                  CT abdomen pelvis wo contrast   Final Result by Marck Morrissey MD (09/15 1656)      1  Interval percutaneous cholecystostomy with resulting decompression of the gallbladder and no evidence of an acute abnormality in the abdomen or pelvis  2   Stable partially calcified mesenteric mass and stable partially calcified metastatic mesenteric lesion in the left paracolic gutter/pelvic inlet  Stable retroperitoneal and mesenteric prominent lymph nodes  Workstation performed: [id number]                    Procedures  Procedures       ED Course                               MDM    Disposition  Final diagnoses:   Generalized weakness   UTI (urinary tract infection)   Acute on chronic renal failure ([facility] )   Abdominal pain     Time reflects when diagnosis was documented in both MDM as applicable and the Disposition within this note     Time User Action Codes Description Comment    9/15/2019  5:09 PM Georgena Handy Add [R53 1] Generalized weakness     9/15/2019  5:09 PM Georgena Handy Add [N39 0] UTI (urinary tract infection)     9/15/2019  5:09 PM Georgena Handy Add [N17 9,  N18 9] Acute on chronic renal failure ([facility] )     9/15/2019  5:09 PM Georgena Handy Add [R10 9] Abdominal pain       ED Disposition     None      Follow-up Information    None         Patient's Medications   Discharge Prescriptions    No medications on file     No discharge procedures on file      ED Provider  Electronically Signed by           Candice Parekh MD  09/15/19 0899

## 2019-09-15 NOTE — ED NOTES
Pt's son was heard yelling and cursing in the hallway that IV fluids were not started on his father  When primary nurse was finished in another pt's room, she went into the pt's room, introduced herself as the primary and informed the son that the pt did have a pre hospital IV and fluids were running, that the IV was accidentally ripped out during transfer in CT and that multiple attempts were made by this RN to restart another IV, all of which failed  Another nurse was informed that help would be needed to start an IV on that pt, but as she was busy with her own pt's, she would be in there as soon as possible  The son screamed at this nurse that "this is unacceptable and that they will be going to [facility] from now on" the son stated that he was making a formal complaint about the hospital staff including CT because that is where the IV came out at; this nurse asked if he would like to speak to the nursing supervisor and he said, "no that's not necessary"  Pt now has an IV with fluids and and antibiotic running       Janes Marcelino, KALYN  09/15/19 1508

## 2019-09-16 LAB
ANION GAP SERPL CALCULATED.3IONS-SCNC: 12 MMOL/L (ref 4–13)
BUN SERPL-MCNC: 56 MG/DL (ref 7–25)
CALCIUM SERPL-MCNC: 8.6 MG/DL (ref 8.6–10.5)
CHLORIDE SERPL-SCNC: 110 MMOL/L (ref 98–107)
CO2 SERPL-SCNC: 17 MMOL/L (ref 21–31)
CREAT SERPL-MCNC: 3.02 MG/DL (ref 0.7–1.3)
CREAT UR-MCNC: 27.4 MG/DL
ERYTHROCYTE [DISTWIDTH] IN BLOOD BY AUTOMATED COUNT: 14.6 % (ref 11.5–14.5)
GFR SERPL CREATININE-BSD FRML MDRD: 19 ML/MIN/1.73SQ M
GLUCOSE SERPL-MCNC: 140 MG/DL (ref 65–99)
GLUCOSE SERPL-MCNC: 142 MG/DL (ref 65–140)
GLUCOSE SERPL-MCNC: 263 MG/DL (ref 65–140)
GLUCOSE SERPL-MCNC: 265 MG/DL (ref 65–140)
GLUCOSE SERPL-MCNC: 312 MG/DL (ref 65–140)
HCT VFR BLD AUTO: 31.2 % (ref 42–47)
HGB BLD-MCNC: 10.3 G/DL (ref 14–18)
MCH RBC QN AUTO: 29 PG (ref 26–34)
MCHC RBC AUTO-ENTMCNC: 33.2 G/DL (ref 31–37)
MCV RBC AUTO: 88 FL (ref 81–99)
PLATELET # BLD AUTO: 241 THOUSANDS/UL (ref 149–390)
PMV BLD AUTO: 8 FL (ref 8.6–11.7)
POTASSIUM SERPL-SCNC: 4 MMOL/L (ref 3.5–5.5)
PROCALCITONIN SERPL-MCNC: 0.12 NG/ML
PROCALCITONIN SERPL-MCNC: 0.18 NG/ML
RBC # BLD AUTO: 3.56 MILLION/UL (ref 4.3–5.9)
SODIUM 24H UR-SCNC: 89 MOL/L
SODIUM SERPL-SCNC: 139 MMOL/L (ref 134–143)
WBC # BLD AUTO: 11.6 THOUSAND/UL (ref 4.8–10.8)

## 2019-09-16 PROCEDURE — 85027 COMPLETE CBC AUTOMATED: CPT | Performed by: PHYSICIAN ASSISTANT

## 2019-09-16 PROCEDURE — 99223 1ST HOSP IP/OBS HIGH 75: CPT | Performed by: INTERNAL MEDICINE

## 2019-09-16 PROCEDURE — 99232 SBSQ HOSP IP/OBS MODERATE 35: CPT | Performed by: INTERNAL MEDICINE

## 2019-09-16 PROCEDURE — 97163 PT EVAL HIGH COMPLEX 45 MIN: CPT

## 2019-09-16 PROCEDURE — 82570 ASSAY OF URINE CREATININE: CPT | Performed by: INTERNAL MEDICINE

## 2019-09-16 PROCEDURE — 80048 BASIC METABOLIC PNL TOTAL CA: CPT | Performed by: PHYSICIAN ASSISTANT

## 2019-09-16 PROCEDURE — 99222 1ST HOSP IP/OBS MODERATE 55: CPT | Performed by: SPECIALIST

## 2019-09-16 PROCEDURE — G8988 SELF CARE GOAL STATUS: HCPCS

## 2019-09-16 PROCEDURE — G8978 MOBILITY CURRENT STATUS: HCPCS

## 2019-09-16 PROCEDURE — G8987 SELF CARE CURRENT STATUS: HCPCS

## 2019-09-16 PROCEDURE — 97167 OT EVAL HIGH COMPLEX 60 MIN: CPT

## 2019-09-16 PROCEDURE — 84145 PROCALCITONIN (PCT): CPT | Performed by: PHYSICIAN ASSISTANT

## 2019-09-16 PROCEDURE — 84300 ASSAY OF URINE SODIUM: CPT | Performed by: INTERNAL MEDICINE

## 2019-09-16 PROCEDURE — G8979 MOBILITY GOAL STATUS: HCPCS

## 2019-09-16 PROCEDURE — 82948 REAGENT STRIP/BLOOD GLUCOSE: CPT

## 2019-09-16 RX ADMIN — INSULIN LISPRO 2 UNITS: 100 INJECTION, SOLUTION INTRAVENOUS; SUBCUTANEOUS at 22:10

## 2019-09-16 RX ADMIN — METHENAMINE HIPPURATE 1 G: 1 TABLET ORAL at 09:52

## 2019-09-16 RX ADMIN — VITAMIN D, TAB 1000IU (100/BT) 1000 UNITS: 25 TAB at 09:52

## 2019-09-16 RX ADMIN — HEPARIN SODIUM 5000 UNITS: 5000 INJECTION INTRAVENOUS; SUBCUTANEOUS at 22:10

## 2019-09-16 RX ADMIN — INSULIN LISPRO 2 UNITS: 100 INJECTION, SOLUTION INTRAVENOUS; SUBCUTANEOUS at 11:41

## 2019-09-16 RX ADMIN — PANTOPRAZOLE SODIUM 40 MG: 40 TABLET, DELAYED RELEASE ORAL at 05:01

## 2019-09-16 RX ADMIN — INSULIN DETEMIR 10 UNITS: 100 INJECTION, SOLUTION SUBCUTANEOUS at 22:11

## 2019-09-16 RX ADMIN — TAMSULOSIN HYDROCHLORIDE 0.4 MG: 0.4 CAPSULE ORAL at 17:10

## 2019-09-16 RX ADMIN — Medication 1 CAPSULE: at 17:10

## 2019-09-16 RX ADMIN — HEPARIN SODIUM 5000 UNITS: 5000 INJECTION INTRAVENOUS; SUBCUTANEOUS at 15:40

## 2019-09-16 RX ADMIN — SODIUM CHLORIDE 125 ML/HR: 9 INJECTION, SOLUTION INTRAVENOUS at 02:46

## 2019-09-16 RX ADMIN — SODIUM CHLORIDE 125 ML/HR: 9 INJECTION, SOLUTION INTRAVENOUS at 11:41

## 2019-09-16 RX ADMIN — ASPIRIN 81 MG 81 MG: 81 TABLET ORAL at 09:52

## 2019-09-16 RX ADMIN — INSULIN LISPRO 3 UNITS: 100 INJECTION, SOLUTION INTRAVENOUS; SUBCUTANEOUS at 17:10

## 2019-09-16 RX ADMIN — COLCHICINE 0.6 MG: 0.6 TABLET, FILM COATED ORAL at 09:52

## 2019-09-16 RX ADMIN — METHENAMINE HIPPURATE 1 G: 1 TABLET ORAL at 17:10

## 2019-09-16 RX ADMIN — AMLODIPINE BESYLATE 10 MG: 5 TABLET ORAL at 09:52

## 2019-09-16 RX ADMIN — SODIUM CHLORIDE 125 ML/HR: 9 INJECTION, SOLUTION INTRAVENOUS at 20:22

## 2019-09-16 RX ADMIN — HEPARIN SODIUM 5000 UNITS: 5000 INJECTION INTRAVENOUS; SUBCUTANEOUS at 05:01

## 2019-09-16 NOTE — ASSESSMENT & PLAN NOTE
Lab Results   Component Value Date    HGBA1C 8 1 (H) 06/14/2019       No results for input(s): POCGLU in the last 72 hours      Blood Sugar Average: Last 72 hrs:  ·  continue insulin coverage monitoring blood sugars

## 2019-09-16 NOTE — PLAN OF CARE
Problem: Potential for Falls  Goal: Patient will remain free of falls  Description  INTERVENTIONS:  - Assess patient frequently for physical needs  -  Identify cognitive and physical deficits and behaviors that affect risk of falls  -  [place] fall precautions as indicated by assessment   - Educate patient/family on patient safety including physical limitations  - Instruct patient to call for assistance with activity based on assessment  - Modify environment to reduce risk of injury  - Consider OT/PT consult to assist with strengthening/mobility  Outcome: Progressing     Problem: Prexisting or High Potential for Compromised Skin Integrity  Goal: Skin integrity is maintained or improved  Description  INTERVENTIONS:  - Identify patients at risk for skin breakdown  - Assess and monitor skin integrity  - Assess and monitor nutrition and hydration status  - Monitor labs   - Assess for incontinence   - Turn and reposition patient  - Assist with mobility/ambulation  - Relieve pressure over bony prominences  - Avoid friction and shearing  - Provide appropriate hygiene as needed including keeping skin clean and dry  - Evaluate need for skin moisturizer/barrier cream  - Collaborate with interdisciplinary team   - Patient/family teaching  - Consider wound care consult   Outcome: Progressing     Problem: Nutrition/Hydration-ADULT  Goal: Nutrient/Hydration intake appropriate for improving, restoring or maintaining nutritional needs  Description  Monitor and assess patient's nutrition/hydration status for malnutrition  Collaborate with interdisciplinary team and initiate plan and interventions as ordered  Monitor patient's weight and dietary intake as ordered or per policy  Utilize nutrition screening tool and intervene as necessary  Determine patient's food preferences and provide high-protein, high-caloric foods as appropriate       INTERVENTIONS:  - Monitor oral intake, urinary output, labs, and treatment plans  - Assess nutrition and hydration status and recommend course of action  - Evaluate amount of meals eaten  - Assist patient with eating if necessary   - Allow adequate time for meals  - Recommend/ encourage appropriate diets, oral nutritional supplements, and vitamin/mineral supplements  - Order, calculate, and assess calorie counts as needed  - Recommend, monitor, and adjust tube feedings and TPN/PPN based on assessed needs  - Assess need for intravenous fluids  - Provide specific nutrition/hydration education as appropriate  - Include patient/family/caregiver in decisions related to nutrition  Outcome: Progressing

## 2019-09-16 NOTE — PROGRESS NOTES
Progress Note - Nimo Kinsey 1940, [de-identified] y o  male MRN: [id number]    Unit/Bed#: -01 Encounter: [id number]    Primary Care Provider: Donald Sosa DO   Date and time admitted to hospital: 9/15/2019  3:37 PM        * Weakness generalized  Assessment & Plan  · Likely multifactorial, given patient's recent cholecystostomy tube, acute renal failure, and neuroendocrine tumor  · Will continue PT/OT  · Supportive care for the above-mentioned medical problems    Acute kidney injury superimposed on chronic kidney disease ([facility] [address] )  Assessment & Plan  · Chronic kidney disease stage 3  · Baseline creatinine appears to be around 1 5  · Nephrology input appreciated  · Will continue IV fluids    Neuroendocrine tumor  Assessment & Plan  · Monthly chemo injection  · Follow up outpatient with Oncology, Dr Madhavi Monet  · Patient missed chemo session this month due to being in the hospital    Biliary sludge  Assessment & Plan  · Patient with right upper quadrant showing sludge, HIDA scan indicative of cholecystitis  · He had percutaneous cholecystostomy on 09/06/2019 and will need a full cholecystectomy in the future  · Patient currently on antibiotics  · Surgery input appreciated    Moderate protein-calorie malnutrition ([facility] [address] )  Assessment & Plan  Malnutrition Findings:        secondary to chronic disease    BMI Findings: Body mass index is 23 26 kg/m²  Consult dietitian    Chronic indwelling Haile catheter  Assessment & Plan  · Patient with history of ESBL and hydroureter  · Continue chronic Haile catheter    Essential hypertension  Assessment & Plan  · Continue meds and monitor    Type 2 diabetes mellitus with stage 3 chronic kidney disease, with long-term current use of insulin (HCC)  Assessment & Plan  · continue insulin coverage monitoring blood sugars      VTE Pharmacologic Prophylaxis: Pharmacologic: Heparin    Patient Centered Rounds: I have performed bedside rounds with nursing staff today      Discussions with Specialists or Other Care Team Provider: yes  Education and Discussions with Family / Patient: yes    Current Length of Stay: 1 day(s)    Current Patient Status: Inpatient   Certification Statement: The patient will continue to require additional inpatient hospital stay due to Acute kidney injury    Discharge Plan:  Pending hospital course    Code Status: Level 1 - Full Code    Subjective:   No overnight events noted  Patient denies any pain complaints  Patient states he still feels weak  Tolerating diet  Denies any nausea or vomiting    Objective:     Vitals:   Temp (24hrs), Av 8 °F (36 6 °C), Min:97 3 °F (36 3 °C), Max:98 1 °F (36 7 °C)    Temp:  [97 3 °F (36 3 °C)-98 1 °F (36 7 °C)] 97 7 °F (36 5 °C)  HR:  [74-90] 74  Resp:  [16-18] 18  BP: (108-168)/(62-74) 168/74  SpO2:  [98 %-99 %] 99 %  Body mass index is 23 26 kg/m²  Input and Output Summary (last 24 hours): Intake/Output Summary (Last 24 hours) at 2019 1729  Last data filed at 2019 1500  Gross per 24 hour   Intake 1000 ml   Output 2500 ml   Net -1500 ml       Physical Exam:     Physical Exam   Constitutional: No distress  Frail elderly male   HENT:   Head: Normocephalic and atraumatic  Eyes: Conjunctivae and EOM are normal    Neck: Normal range of motion  Neck supple  Cardiovascular: Normal rate and regular rhythm  Pulmonary/Chest: Effort normal  No respiratory distress  Abdominal: Soft  There is no tenderness  Cholecystostomy tube in place   Musculoskeletal: He exhibits no edema  Neurological: He is alert  No cranial nerve deficit  Skin: Skin is warm and dry  Psychiatric: He exhibits a depressed mood         Additional Data:     Labs:    Results from last 7 days   Lab Units 19  0619 09/15/19  1554   WBC Thousand/uL 11 60* 14 30*   HEMOGLOBIN g/dL 10 3* 11 0*   HEMATOCRIT % 31 2* 33 4*   PLATELETS Thousands/uL 241 266   NEUTROS PCT %  --  82*   LYMPHS PCT %  --  10*   MONOS PCT %  --  7   EOS PCT %  -- 1     Results from last 7 days   Lab Units 09/16/19  0620 09/15/19  1606   POTASSIUM mmol/L 4 0 4 3   CHLORIDE mmol/L 110* 105   CO2 mmol/L 17* 16*   BUN mg/dL 56* 59*   CREATININE mg/dL 3 02* 3 12*   CALCIUM mg/dL 8 6 8 8   ALK PHOS U/L  --  185*   ALT U/L  --  14   AST U/L  --  10*         Results from last 7 days   Lab Units 09/16/19  1640 09/16/19  1106 09/16/19  0623 09/15/19  2117 09/10/19  1101 09/10/19  0629 09/09/19 2006   POC GLUCOSE mg/dl 312* 265* 142* 149* 331* 178* 271*           * I Have Reviewed All Lab Data Listed Above  * Additional Pertinent Lab Tests Reviewed: Laura [facility] Admission  Reviewed    Imaging:  Imaging Reports Reviewed Today Include:  No new imaging    Recent Cultures (last 7 days):     Results from last 7 days   Lab Units 09/15/19  1607   URINE CULTURE  Culture results to follow         Last 24 Hours Medication List:     Current Facility-Administered Medications:  acetaminophen 650 mg Oral Q6H PRN Annie Orantes PA-C    amLODIPine 10 mg Oral Daily Annie Orantes PA-C    aspirin 81 mg Oral Daily Annie Orantes PA-C    b complex-vitamin C-folic acid 1 capsule Oral Daily With Dinner Annie Orantes PA-C    cholecalciferol 1,000 Units Oral Daily Annie Orantes PA-C    colchicine 0 6 mg Oral Daily Annie Orantes PA-C    heparin (porcine) 5,000 Units Subcutaneous Q8H [address] Amalia Doran PA-C    insulin detemir 10 Units Subcutaneous HS Annie Orantes PA-C    insulin lispro 1-5 Units Subcutaneous TID AC Amalia Doran PA-C    insulin lispro 1-5 Units Subcutaneous HS Amalia Doran PA-C    methenamine hippurate 1 g Oral BID Annie Orantes PA-C    ondansetron 4 mg Intravenous Q6H PRN Annie Orantes PA-C    oxyCODONE-acetaminophen 1 tablet Oral Q6H PRN Annie Orantes PA-C    pantoprazole 40 mg Oral Early Morning Annie Orantes PA-C    sodium chloride 125 mL/hr Intravenous Continuous Bennye Knoll, PA-C Last Rate: 125 mL/hr (09/16/19 1141)   tamsulosin 0 4 mg Oral Daily With Dinner Aron Christiansen PA-C         Today, Patient Was Seen By: Preston Flores MD    ** Please Note: Dictation voice to text software may have been used in the creation of this document   **

## 2019-09-16 NOTE — CONSULTS
Consultation - Nephrology   Nimisha Leblanc [de-identified] y o  male MRN: [id number]  Unit/Bed#: -01 Encounter: [id number]      A/P:  1  Acute renal failure due to acute tubular necrosis             Check patient's urine sodium and creatinine, to reassess for tubular necrosis  My guess is the patient has reached a plateau region in his recovery phase  Continue monitor electrolytes closely in at supportive care as indicated  CT scan noted resolution of the patient's prior evidence of right hydroureter  No further imaging at this point indicated  2  Chronic kidney disease stage 3 with baseline creatinine likely around 1 4 - 1 5 mg/dL             Patient has been having progressive loss of kidney function over last several months  I hope is he is currently experiencing acute tubular necrosis and not another step-down and progressive renal function loss  Continue monitor closely  3  Probable diabetic nephropathy  4  Proteinuria   We have been avoiding further evaluation, specifically from the serology and renal biopsy standpoint  Patient may in fact have an underlying glomerular nephritic process but at this point given his current diagnoses of metastatic neuroendocrine tumor and other clinical issues specifically cholecystitis with a percutaneous cholecystostomy, I am very hesitant to proceed with renal biopsy  Even if renal biopsy was obtained and cause of glomerulonephritis/proteinuria was identified, he would be a very poor candidate for aggressive treatment  5  Acidosis                IVBHY bicarbonate is at 17 millimole/L, consider supplementation if this continues to decrease  No current evidence of watery diarrhea  6  Neuroendocrine tumor               Continue follow-up with Dr Leda Osuna is continue with Lanreotide injections every 4 weeks for now   =============  7  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              Continue Haile catheter    8  Right hydroureter nephrosis   Resolved       Thank you for allowing us to participate in the care of your patient  Please feel free to contact us regarding the care of this patient, or any other questions/concerns that may be applicable  Patient Active Problem List   Diagnosis    Weakness generalized    Type 2 diabetes mellitus with stage 3 chronic kidney disease, with long-term current use of insulin (HCC)    Essential hypertension    Mixed hyperlipidemia    Cardiac disease    Generalized abdominal mass    Hydroureter    Metabolic acidosis    Iron deficiency anemia secondary to inadequate dietary iron intake    Accelerated hypertension    Liver mass    Neuroendocrine tumor    Acute cystitis without hematuria    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)    Acute kidney injury superimposed on chronic kidney disease (HCC)    Pressure injury of right heel, unstageable (HCC)    Atherosclerosis of artery of extremity with ulceration (HCC)    Carcinoid syndrome (HCC)    Acute cystitis with hematuria    Chronic indwelling Haile catheter    Moderate protein-calorie malnutrition (HCC)    Biliary sludge    Urinary tract infection due to extended-spectrum beta lactamase (ESBL) producing Escherichia coli    Malignant neuroendocrine neoplasm (HCC)    Generalized abdominal pain    Urinary retention       History of Present Illness   Physician Requesting Consult: Stephen Flores MD  Reason for Consult / Principal Problem:  Acute kidney injury  Hx and PE limited by:   HPI: Jeovany Pradhan is a [de-identified]y o  year old male who presented department on September 15th with complaints of weakness that began the day after discharge from the hospital which was on September 10th  By September 11th, we considered again, patient admits that he has had some anorexia but has otherwise been eating and drinking without abdominal pain now status post percutaneous cholecystostomy    He denies fever, no change in the quality or quantity of urine  Patient claims that he feels exhausted unable to do small basic tasks, while trying to ambulate to the bathroom is very difficult  He denies lightheadedness or vertigo with ambulation  He claims to be short of breath with minimal exertion  From the renal standpoint, patient has known underlying chronic kidney disease and during his most recent admission patient sustained an episode of acute tubular necrosis  Who presented back to the hospital a creatinine of 3 02 mg/dL along with a bicarbonate of 17 millimole/L  There are no other significant electrolyte abnormalities at presentation  Denies any use of nonsteroidal anti-inflammatory medications, patient also denies diarrhea  Patient claims that he was diagnosed with a urinary tract infection  Urinalysis was reviewed in fact this evidence of potential infection, however, patient has known colonized bacteria and it is unclear if you need to continue with antibiotics at this time  Of note with the start of ertapenem which will treat his ESBL history, he has not improved  History obtained from chart review and the patient    Review of Systems - Negative except as mentioned above in HPI, more specifics as mentioned below    Review of Systems - General ROS: positive for  - fatigue  Psychological ROS: negative  Ophthalmic ROS: negative  ENT ROS: negative  Allergy and Immunology ROS: negative  Hematological and Lymphatic ROS: negative  Endocrine ROS: negative  Respiratory ROS: positive for - shortness of breath  Cardiovascular ROS: positive for - dyspnea on exertion, no chest pain  Gastrointestinal ROS: positive for - abdominal pain and appetite loss  Genito-Urinary ROS: no dysuria, trouble voiding, or hematuria  Musculoskeletal ROS: positive for - gait disturbance and muscular weakness  Neurological ROS: no TIA or stroke symptoms  Dermatological ROS: negative    Historical Information   Past Medical History:   Diagnosis Date    BPH (benign prostatic hyperplasia)     Cancer ([facility] )     liver cancer    Cardiac disease     Coronary artery disease     Diabetes mellitus ([facility] )     DJD (degenerative joint disease)     Gait disturbance     Generalized weakness     GERD (gastroesophageal reflux disease)     Gout     History of transfusion     Hyperlipidemia     Hypertension     Pressure injury of skin     Renal disorder      Past Surgical History:   Procedure Laterality Date    CORONARY ANGIOPLASTY WITH STENT PLACEMENT      IR IMAGE GUIDED BIOPSY/ASPIRATION LIVER  1/24/2019    IR TUBE PLACEMENT ROQUE  9/6/2019     Social History   Social History     Substance and Sexual Activity   Alcohol Use Never    Frequency: Never     Social History     Substance and Sexual Activity   Drug Use Never     Social History     Tobacco Use   Smoking Status Never Smoker   Smokeless Tobacco Never Used     Family History   Problem Relation Age of Onset    Cancer Mother     Hypertension Father     Cancer Brother     Cancer Maternal Grandmother     Cancer Paternal Aunt        Meds/Allergies   all current active meds have been reviewed, current meds:   Current Facility-Administered Medications   Medication Dose Route Frequency    acetaminophen (TYLENOL) tablet 650 mg  650 mg Oral Q6H PRN    amLODIPine (NORVASC) tablet 10 mg  10 mg Oral Daily    aspirin chewable tablet 81 mg  81 mg Oral Daily    b complex-vitamin C-folic acid (NEPHROCAPS) capsule 1 capsule  1 capsule Oral Daily With Dinner    cholecalciferol (VITAMIN D3) tablet 1,000 Units  1,000 Units Oral Daily    colchicine (COLCRYS) tablet 0 6 mg  0 6 mg Oral Daily    heparin (porcine) subcutaneous injection 5,000 Units  5,000 Units Subcutaneous Q8H [address]    insulin detemir (LEVEMIR) subcutaneous injection 10 Units  10 Units Subcutaneous HS    insulin lispro (HumaLOG) 100 units/mL subcutaneous injection 1-5 Units  1-5 Units Subcutaneous TID AC    insulin lispro (HumaLOG) 100 units/mL subcutaneous injection 1-5 Units  1-5 Units Subcutaneous HS    methenamine hippurate (HIPREX) tablet 1 g  1 g Oral BID    ondansetron (ZOFRAN) injection 4 mg  4 mg Intravenous Q6H PRN    oxyCODONE-acetaminophen (PERCOCET) 5-325 mg per tablet 1 tablet  1 tablet Oral Q6H PRN    pantoprazole (PROTONIX) EC tablet 40 mg  40 mg Oral Early Morning    sodium chloride 0 9 % infusion  125 mL/hr Intravenous Continuous    tamsulosin (FLOMAX) capsule 0 4 mg  0 4 mg Oral Daily With Dinner    and PTA meds:    Medications Prior to Admission   Medication    amLODIPine (NORVASC) 10 mg tablet    aspirin 81 MG tablet    b complex-vitamin C-folic acid (NEPHROCAPS) 1 mg capsule    cholecalciferol (VITAMIN D3) 1,000 units tablet    colchicine (COLCRYS) 0 6 mg tablet    insulin lispro (HumaLOG) 100 units/mL injection    [] levofloxacin (LEVAQUIN) 250 mg tablet    methenamine hippurate (HIPREX) 1 g tablet    oxyCODONE-acetaminophen (PERCOCET) 5-325 mg per tablet    pantoprazole (PROTONIX) 40 mg tablet    tamsulosin (FLOMAX) 0 4 mg    insulin detemir (LEVEMIR) 100 units/mL subcutaneous injection         No Known Allergies    Objective     Intake/Output Summary (Last 24 hours) at 2019 1207  Last data filed at 2019 0800  Gross per 24 hour   Intake 1100 ml   Output 1660 ml   Net -560 ml       Invasive Devices:   Urethral Catheter Latex 16 Fr  (Active)   Reasons to continue Urinary Catheter  Chronic urinary catheter 2019  8:00 AM   Goal for Removal N/A- chronic garcia 2019  8:00 AM   Site Assessment Clean;Skin intact 2019  8:00 AM   Collection Container Standard drainage bag 2019  8:00 AM   Securement Method Securing device (Describe) 2019  8:00 AM   Output (mL) 600 mL 2019  5:07 AM       Physical Exam      I/O last 3 completed shifts:   In: 1100 [I V :1100]  Out: 1610 [Urine:1300; Drains:310]    Vitals:    19 1107   BP: 159/69   Pulse: 81   Resp: 18   Temp: 98 °F (36 7 °C)   SpO2: 98% Gen: in NAD, Alert/Awake  HEENT: no sclerous icterus, MMM, neck supple  CV: +S1/S2, RRR  Lungs: CTA bilaterally  Abd: +BS, soft NT/ND  Ext: all four extremities are warm, no bilateral lower extremity edema  Skin: no rashes noted  Neuro: CN II-XII intact    Current Weight: Weight - Scale: 69 4 kg (153 lb)  First Weight: Weight - Scale: 69 4 kg (153 lb)    Lab Results:  I have personally reviewed pertinent labs      CBC:   Lab Results   Component Value Date    WBC 11 60 (H) 09/16/2019    HGB 10 3 (L) 09/16/2019    HCT 31 2 (L) 09/16/2019    MCV 88 09/16/2019     09/16/2019    MCH 29 0 09/16/2019    MCHC 33 2 09/16/2019    RDW 14 6 (H) 09/16/2019    MPV 8 0 (L) 09/16/2019     CMP:   Lab Results   Component Value Date    K 4 0 09/16/2019     (H) 09/16/2019    CO2 17 (L) 09/16/2019    BUN 56 (H) 09/16/2019    CREATININE 3 02 (H) 09/16/2019    CALCIUM 8 6 09/16/2019    AST 10 (L) 09/15/2019    ALT 14 09/15/2019    ALKPHOS 185 (H) 09/15/2019    EGFR 19 09/16/2019     Phosphorus: No results found for: PHOS  Magnesium: No results found for: MG  Urinalysis:   Lab Results   Component Value Date    COLORU Yellow 09/15/2019    CLARITYU Cloudy (A) 09/15/2019    SPECGRAV 1 020 09/15/2019    PHUR 5 0 09/15/2019    LEUKOCYTESUR 3+ (A) 09/15/2019    NITRITE Positive (A) 09/15/2019    GLUCOSEU Negative 09/15/2019    KETONESU Negative 09/15/2019    BILIRUBINUR Negative 09/15/2019    BLOODU 2+ (A) 09/15/2019     Ionized Calcium: No results found for: CAION  Coagulation: No results found for: PT, INR, APTT  Troponin: No results found for: TROPONINI  ABG: No results found for: PHART, HCI7VKR, PO2ART, IOW0UKF, R4ZUGAUK, BEART, SOURCE    Results from last 7 days   Lab Units 09/16/19  0620 09/15/19  1606 09/10/19  0626   POTASSIUM mmol/L 4 0 4 3 3 8   CHLORIDE mmol/L 110* 105 107   CO2 mmol/L 17* 16* 18*   BUN mg/dL 56* 59* 51*   CREATININE mg/dL 3 02* 3 12* 2 93*   CALCIUM mg/dL 8 6 8 8 8 8   ALK PHOS U/L  --  185*  -- ALT U/L  --  14  --    AST U/L  --  10*  --        Radiology review:  Procedure: Ct Abdomen Pelvis Wo Contrast    Result Date: 9/15/2019  Narrative: CT ABDOMEN AND PELVIS WITHOUT IV CONTRAST INDICATION:   Abdominal pain  Generalized weakness COMPARISON:  9/4/2019 and 6/4/2019  TECHNIQUE:  CT examination of the abdomen and pelvis was performed without intravenous contrast   Axial, sagittal, and coronal 2D reformatted images were created from the source data and submitted for interpretation  Radiation dose length product (DLP) for this visit:  407 2 mGy-cm   This examination, like all CT scans performed in the [facility], was performed utilizing techniques to minimize radiation dose exposure, including the use of iterative reconstruction and automated exposure control  Enteric contrast was not administered  FINDINGS: ABDOMEN LOWER CHEST:  No clinically significant abnormality identified in the visualized lower chest  LIVER/BILIARY TREE:  Unremarkable  GALLBLADDER:  Interval percutaneous cholecystostomy with decompression of the gallbladder  SPLEEN:  Unremarkable  PANCREAS:  Unremarkable  ADRENAL GLANDS:  Unremarkable  KIDNEYS/URETERS:  Unremarkable  No hydronephrosis  STOMACH AND BOWEL: Stable coarse calcification adjacent to the gastric fundus, nonspecific  Unremarkable  APPENDIX:  No findings to suggest appendicitis  ABDOMINOPELVIC CAVITY: Stable partially calcified soft tissue mass in the root of the mesentery again measuring 4 9 x 3 6 cm when measured in a similar fashion  A partially calcified soft tissue nodule in the left paracolic gutter is also stable, measuring 1 2 x 1 8 cm (2/77)  Soft tissue stranding is seen surrounding this nodule, which is increased since 9/4/2019, but unchanged compared to 6/4/2019  Prominent mesenteric and retroperitoneal lymph nodes are not substantially changed  For example, a left paraortic node again measures 9 mm (2/45)    No ascites or free intraperitoneal air  VESSELS:  Unremarkable for patient's age  PELVIS REPRODUCTIVE ORGANS:  The prostate gland is again enlarged  URINARY BLADDER:  Collapsed around a Haile catheter  ABDOMINAL WALL/INGUINAL REGIONS:  Unremarkable  OSSEOUS STRUCTURES:  No acute fracture or destructive osseous lesion  Impression: 1  Interval percutaneous cholecystostomy with resulting decompression of the gallbladder and no evidence of an acute abnormality in the abdomen or pelvis  2   Stable partially calcified mesenteric mass and stable partially calcified metastatic mesenteric lesion in the left paracolic gutter/pelvic inlet  Stable retroperitoneal and mesenteric prominent lymph nodes   Workstation performed: [id number]       Dearl DO Shin

## 2019-09-16 NOTE — UTILIZATION REVIEW
Notification of Inpatient Admission/Inpatient Authorization Request  This is a Notification of Inpatient Admission/Request for Inpatient Authorization for our facility [facility]  Be advised that this patient was admitted to our facility under Inpatient Status  Please contact the Utilization Review Department where the patient is receiving care services for additional admission information  Place of Service Code: 24   Place of Service Name: [facility] Lam[facility]  Presentation Date & Time: 9/15/2019  3:37 PM  Inpatient Admission Date & Time: 9/15/19 1711  Discharge Date & Time: No discharge date for patient encounter  Discharge Disposition (if discharged): Home with [facility]  Attending Physician & NPI#: Don Perez, [address] Veronika Jules [[id number]]   Attending Physician:  MINISTERIO Garcia  Specialty- Internal Medicine  [address] [place] [address]  Phone 1: [phone]  Fax: [phone]   Admission Orders (From admission, onward)     Ordered        09/15/19 1711  Inpatient Admission (expected length of stay for this patient Order details is greater than two midnights)  Once                   Facility: [address] [place] [address] Utilization Review Department  Phone: [phone]; Fax [phone]  [email or web address]  ATTENTION: Please call with any questions or concerns to [phone]  and carefully listen to the prompts so that you are directed to the right person  Send all requests for admission clinical reviews, approved or denied determinations and any other requests to fax [phone]   All voicemails are confidential

## 2019-09-16 NOTE — PLAN OF CARE
Problem: OCCUPATIONAL THERAPY ADULT  Goal: Performs self-care activities at highest level of function for planned discharge setting  See evaluation for individualized goals  Description  Treatment Interventions: ADL retraining, Functional transfer training, UE strengthening/ROM, Endurance training, Cognitive reorientation, Patient/family training, Equipment evaluation/education, Neuromuscular reeducation, Compensatory technique education          See flowsheet documentation for full assessment, interventions and recommendations  Note:   Limitation: Decreased ADL status, Decreased UE strength, Decreased cognition, Decreased endurance, Decreased self-care trans, Decreased high-level ADLs     Assessment: Pt is a [de-identified] y o  male who was admitted to [address] [place] [address] on 9/15/2019 with Weakness generalized  At this time, patient is also affected by the comorbidities of: metabolic acidosis, anemia, HTN, neuroendocrine tumor, SILVERIO, DM and urinary retention  Additionally, patient is affected by the following personal factors:steps to enter environment, difficulty performing ADLS, difficulty performing IADLS  and decreased initiation and engagement   Orders placed for OT evaluation/treatment with up and OOB as tolerated orders  Prior to admission, Patient reporting at baseline being independent with ADLs, ambulatory with RW, and lives with friend in a one story house with 2 BETH  Upon OT evaluation, patient requires moderate assist for UB ADLs and maximum assist for LB ADLs  Occupational performance is affected by the following deficits: decreased strength, decreased balance, decreased tolerance, impaired initiation, impaired memory, impaired sequencing and impaired problem solving  Based on the following information, patient would benefit from continued skilled OT treatment while in the hospital to address deficits and maximize level of functional independence with ADL's and functional mobility   Occupational Performance areas to address include: eating, grooming, bathing/shower, toilet hygiene, dressing, functional mobility and clothing management  From OT standpoint, recommendation at time of d/c would be short term rehab       OT Discharge Recommendation: Short Term Rehab  OT - OK to Discharge: Yes(Once medically cleared)     Kameron Ventura, OT

## 2019-09-16 NOTE — ASSESSMENT & PLAN NOTE
Malnutrition Findings:        secondary to chronic disease    BMI Findings: Body mass index is 23 26 kg/m²     Consult dietitian

## 2019-09-16 NOTE — ASSESSMENT & PLAN NOTE
· Patient with right upper quadrant showing sludge, HIDA scan indicative of cholecystitis  · He had percutaneous cholecystostomy on 09/06/2019 and will need a full cholecystectomy in the future  · Patient currently on antibiotics  · Surgery input appreciated

## 2019-09-16 NOTE — PHYSICAL THERAPY NOTE
Physical Therapy Evaluation     Patient's Name: Juliet Schrader    Admitting Diagnosis  UTI (urinary tract infection) [N39 0]  Weakness [R53 1]  Abdominal pain [R10 9]  Acute on chronic renal failure ([facility] ) [N17 9, N18 9]  Generalized weakness [R53 1]    Problem List  Patient Active Problem List   Diagnosis    Weakness generalized    Type 2 diabetes mellitus with stage 3 chronic kidney disease, with long-term current use of insulin ([facility] )    Essential hypertension    Mixed hyperlipidemia    Cardiac disease    Generalized abdominal mass    Hydroureter    Metabolic acidosis    Iron deficiency anemia secondary to inadequate dietary iron intake    Accelerated hypertension    Liver mass    Neuroendocrine tumor    Acute cystitis without hematuria    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)    Acute kidney injury superimposed on chronic kidney disease (HCC)    Pressure injury of right heel, unstageable ([facility] )    Atherosclerosis of artery of extremity with ulceration (HCC)    Carcinoid syndrome ([facility] )    Acute cystitis with hematuria    Chronic indwelling Haile catheter    Moderate protein-calorie malnutrition (HCC)    Biliary sludge    Urinary tract infection due to extended-spectrum beta lactamase (ESBL) producing Escherichia coli    Malignant neuroendocrine neoplasm (HCC)    Generalized abdominal pain    Urinary retention       Past Medical History  Past Medical History:   Diagnosis Date    BPH (benign prostatic hyperplasia)     [facility]     liver cancer    Cardiac disease     Coronary artery disease     Diabetes mellitus ([facility] )     DJD (degenerative joint disease)     Gait disturbance     Generalized weakness     GERD (gastroesophageal reflux disease)     Gout     History of transfusion     Hyperlipidemia     Hypertension     Pressure injury of skin     Renal disorder        Past Surgical History  Past Surgical History:   Procedure Laterality Date    CORONARY ANGIOPLASTY WITH STENT PLACEMENT      IR IMAGE GUIDED BIOPSY/ASPIRATION LIVER  1/24/2019    IR TUBE PLACEMENT ROQUE  9/6/2019 09/16/19 1135   Note Type   Note type Eval/Treat   Pain Assessment   Pain Assessment No/denies pain   Pain Score No Pain   Home Living   Type of [address] One level;Performs ADLs on one level; Able to live on main level with bedroom/bathroom;Stairs to enter with rails  (2 small BETH through garage w/HR)   Bathroom Shower/Tub Tub/shower unit   [address] [place] [address]  chair   [address]   Prior Function   Level of [place] Needs assistance with ADLs and functional mobility; Needs assistance with IADLs   Lives With Significant other   Receives Help From Family   ADL Assistance Needs assistance   IADLs Needs assistance   Falls in the last 6 months 1 to 4   Vocational Retired   Comments pt's gf reporting since recent d/c from [facility], pt has not been able to get OOB since Wednesday, 9/11/19   Restrictions/Precautions   Weight Bearing Precautions Per Order No   Other Precautions Contact/isolation;Multiple lines; Fall Risk  (BONILLA drain)   General   Family/Caregiver Present Yes  (SO)   Cognition   Arousal/Participation Cooperative   Orientation Level Oriented X4   Memory Decreased recall of recent events;Decreased recall of precautions   Following Commands Follows one step commands without difficulty   Comments pt agreeable to PT session   RLE Assessment   RLE Assessment X   Strength RLE   R Hip Flexion 3-/5   R Knee Extension 3-/5   R Ankle Dorsiflexion 3/5   LLE Assessment   LLE Assessment X   Strength LLE   L Hip Flexion 3-/5   L Knee Extension 3-/5   L Ankle Dorsiflexion 3/5   Coordination   Sensation WFL   Bed Mobility   Supine to Sit 3  Moderate assistance   Additional items Assist x 2; Increased time required;Verbal cues;LE management   Transfers   Sit to Stand 3  Moderate assistance   Additional items Assist x 2;Increased time required;Verbal cues   Stand to Sit 3  Moderate assistance   Additional items Assist x 2; Increased time required;Verbal cues   Ambulation/Elevation   Gait pattern Improper Weight shift;Decreased foot clearance;Shuffling; Step to;Excessively slow   Gait Assistance 3  Moderate assist   Additional items Assist x 2;Verbal cues; Tactile cues   Assistive Device Rolling walker   Distance 2 ft from bed>recliner   Balance   Static Sitting Fair   Dynamic Sitting Fair -   Static Standing Poor +   Dynamic Standing Poor +   Ambulatory Poor +   Endurance Deficit   Endurance Deficit Yes   Activity Tolerance   Activity Tolerance Patient limited by fatigue   Medical Staff Made Aware pt w/ up and OOB as tolerated order   Nurse Made Aware Yes, RN verbalized pt appropriate for PT session   Assessment   Prognosis Fair   Problem List Decreased strength;Decreased endurance; Impaired balance;Decreased mobility; Decreased safety awareness   Assessment Pt is [de-identified] y o  male seen for PT evaluation on 9/16/2019 s/p admit to [facility] on 9/15/2019 w/ Weakness generalized  PT consulted to assess pt's functional mobility and d/c needs  Order placed for PT eval and tx, w/ up and OOB as tolerated order  Performed at least 2 patient identifiers during session: Name and wristband  Comorbidities affecting pt's physical performance at time of assessment include: generalized weakness, biliary sludge, moderate protein-calorie malnutrition, chronic indwelling garcia, SILVERIO on CKD stage 3, neuroendocrine tumor, iron deficiency anemia, essential HTN, DM type 2  PTA, pt was independent w/ all functional mobility w/ RW at baseline, ambulates household distances, has 2 BETH and lives w/ SO in 1 level home  Since recent d/c from [facility], pt has been bed-ridden   Personal factors affecting pt at time of IE include: ambulating w/ assistive device, stairs to enter home, inability to navigate level surfaces w/o external assistance, unable to perform dynamic tasks in community, positive fall history, decreased initiation and engagement, unable to perform physical activity, limited insight into impairments, inability to perform IADLs and inability to perform ADLs  Please find objective findings from PT assessment regarding body systems outlined above with impairments and limitations including weakness, impaired balance, decreased endurance, decreased activity tolerance, decreased functional mobility tolerance and fall risk, as well as mobility assessment (need for cueing for mobility technique)  The following objective measures performed on IE also reveal limitations: Barthel Index: 25/100  Pt's clinical presentation is currently unstable/unpredictable seen in pt's presentation of need for input for task focus and mobility technique and ongoing medical assessment  Pt to benefit from continued PT tx to address deficits as defined above and maximize level of functional independent mobility and consistency  From PT/mobility standpoint, recommendation at time of d/c would be STR pending progress in order to facilitate return to PLOF  Barriers to Discharge Inaccessible home environment;Decreased caregiver support   Goals   Patient Goals "to return home"   [facility] Expiration Date 09/26/19   Short Term Goal #1 1 )Patient will complete bed mobility with supervision of 1 for decrease need for caregiver assistance, decrease burden of care  2 ) Patient will complete transfers with min A of 1 to decrease risk of falls, facilitate upright standing posture  3 ) BLE strength to greater than/equal to 4-/5 gross musculature to increase ability to safely transfer, control descent to chair   4 ) Patient will exhibit increase static standing balance to Fair+, for 1-3 minutes  without LOB and min A  of 1 to improve activity tolerance and endurance 5 ) Patient will exhibit increase dynamic ambulatory balance to Fair+ for 100 feet w/RW min A of 1 to improve ability to mobilize to toilet, chair and decrease risk for additional medical complications  6 ) Patient will exhibit good self monitoring and ability to follow 2 step commands to increase complexity of tasks and resume ADL's without LOB  Treatment Day 0   Plan   Treatment/Interventions Functional transfer training;LE strengthening/ROM; Elevations; Therapeutic exercise; Endurance training;Patient/family training;Equipment eval/education; Bed mobility;Gait training;Spoke to nursing;Spoke to case management;OT   PT Frequency   (3-5x/wk)   Recommendation   Recommendation Short-term skilled PT   Equipment Recommended Walker  (continue RW use)   PT - OK to Discharge Yes  (when medically cleared, if to STR)   Barthel Index   Feeding 5   Bathing 0   Grooming Score 0   Dressing Score 5   Bladder Score 0   Bowels Score 5   Toilet Use Score 5   Transfers (Bed/Chair) Score 5   Mobility (Level Surface) Score 0   Stairs Score 0   Barthel Index Score 25         Francisco Thomas, PT

## 2019-09-16 NOTE — UTILIZATION REVIEW
Initial Clinical Review    Admission: Date/Time/Statement: Inpatient Admission Orders (From admission, onward)     Ordered        09/15/19 1711  Inpatient Admission (expected length of stay for this patient Order details is greater than two midnights)  Once                   Orders Placed This Encounter   Procedures    Inpatient Admission (expected length of stay for this patient Order details is greater than two midnights)     Standing Status:   Standing     Number of Occurrences:   1     Order Specific Question:   Admitting Physician     Answer:   Maria Guadalupe Miller [[id number]]     Order Specific Question:   Level of Care     Answer:   Med Surg [[id number]]     Order Specific Question:   Estimated length of stay     Answer:   More than 2 Midnights     Order Specific Question:   Certification     Answer:   I certify that inpatient services are medically necessary for this patient for a duration of greater than two midnights  See H&P and MD Progress Notes for additional information about the patient's course of treatment  ED Arrival Information     Expected Arrival Acuity Means of Arrival Escorted By Service Admission Type    - 9/15/2019 15:33 Urgent Ambulance [facility] General Medicine Urgent    Arrival Complaint    weakness not eating        Chief Complaint   Patient presents with    Weakness - Generalized     Pt states that he has generalized weakness, states that it never ended and has been going on for some time now; recent surgery for gallbladder tube within 2 weeks according to pt     Assessment/Plan: [de-identified] y/o male presents to ED from home by EMS with generalized weakness, difficulty getting oob, abd pain  Recent hospitalization for percutaneous cholecystostomy, SILVERIO with ATN  On exam, lower abd tenderness, cholecystostomy drain present  Pt has chronic indwelling garcia catheter and hx ESBL  Received IV ertapenem in ED  Admitted as inpatient due to generalized weakness, biliary sludge, SILVERIO on CKD    Unclear etiology of weakness  Urine and blood cx pending  He had percutaneous cholecystostomy on 09/06/2019 and will need a full cholecystectomy in the future  Consult general surgery  Baseline Cr 1 4-1 5, now 3 12  Consult nephrology      ED Triage Vitals [09/15/19 1541]   Temperature Pulse Respirations Blood Pressure SpO2   97 9 °F (36 6 °C) 93 18 152/74 100 %      Temp Source Heart Rate Source Patient Position - Orthostatic VS BP Location FiO2 (%)   Temporal Monitor Lying Left arm --      Pain Score       No Pain        Wt Readings from Last 1 Encounters:   09/15/19 69 4 kg (153 lb)     Additional Vital Signs:   Date/Time Temp Pulse Resp BP SpO2 O2 Device   09/16/19 1107 98 °F (36 7 °C) 81 18 159/69 98 % None (Room air)   09/16/19 0722 98 °F (36 7 °C) 78 18 158/74 99 % None (Room air)   09/16/19 0015 97 3 °F (36 3 °C) 80 18 108/62 98 % None (Room air)   09/15/19 1901 98 1 °F (36 7 °C) 90 16 164/73 99 % None (Room air)       Pertinent Labs/Diagnostic Test Results:   Lab Units 09/16/19  0619 09/15/19  1554   WBC Thousand/uL 11 60* 14 30*   HEMOGLOBIN g/dL 10 3* 11 0*   HEMATOCRIT % 31 2* 33 4*   PLATELETS Thousands/uL 241 266   NEUTROS ABS Thousands/µL  --  11 70*     Lab Units 09/16/19  0620 09/15/19  1606   SODIUM mmol/L 139 134   POTASSIUM mmol/L 4 0 4 3   CHLORIDE mmol/L 110* 105   CO2 mmol/L 17* 16*   ANION GAP mmol/L 12 13   BUN mg/dL 56* 59*   CREATININE mg/dL 3 02* 3 12*   EGFR ml/min/1 73sq m 19 18   CALCIUM mg/dL 8 6 8 8     Results from last 7 days   Lab Units 09/15/19  1606   AST U/L 10*   ALT U/L 14   ALK PHOS U/L 185*   TOTAL PROTEIN g/dL 6 8   ALBUMIN g/dL 3 3*   TOTAL BILIRUBIN mg/dL 0 40     Lab Units 09/16/19  1106 09/16/19  0623 09/15/19  2117   POC GLUCOSE mg/dl 265* 142* 149*     Lab Units 09/16/19  0620 09/15/19  1606   GLUCOSE RANDOM mg/dL 140* 205*     Results from last 7 days   Lab Units 09/16/19  0619 09/15/19  2144   PROCALCITONIN ng/ml 0 12 0 18     Results from last 7 days   Lab Units 09/15/19  1606   LACTIC ACID mmol/L 1 8     Results from last 7 days   Lab Units 09/15/19  1606   LIPASE u/L 138*     Results from last 7 days   Lab Units 09/15/19  1607   CLARITY UA  Cloudy*   COLOR UA  Yellow   SPEC GRAV UA  1 020   PH UA  5 0   GLUCOSE UA mg/dl Negative   KETONES UA mg/dl Negative   BLOOD UA  2+*   PROTEIN UA mg/dl 1+*   NITRITE UA  Positive*   BILIRUBIN UA  Negative   UROBILINOGEN UA E U /dl 0 2   LEUKOCYTES UA  3+*   WBC UA /hpf Innumerable*   RBC UA /hpf 1-2*   BACTERIA UA /hpf Innumerable*   EPITHELIAL CELLS WET PREP /hpf None Seen     9/15 CT abd/pelvis:  1   Interval percutaneous cholecystostomy with resulting decompression of the gallbladder and no evidence of an acute abnormality in the abdomen or pelvis  2   Stable partially calcified mesenteric mass and stable partially calcified metastatic mesenteric lesion in the left paracolic gutter/pelvic inlet   Stable retroperitoneal and mesenteric prominent lymph nodes      ED Treatment:   Medication Administration from 09/15/2019 1532 to 09/15/2019 1927       Date/Time Order Dose Route Action     09/15/2019 1845 sodium chloride 0 9 % infusion 125 mL/hr Intravenous New Bag     09/15/2019 1608 sodium chloride 0 9 % infusion 125 mL/hr Intravenous New Bag     09/15/2019 1847 ertapenem (INVanz) 500 mg in sodium chloride 0 9 % 50 mL IVPB 500 mg Intravenous New Bag        Past Medical History:   Diagnosis Date    BPH (benign prostatic hyperplasia)     Cancer (HCC)     liver cancer    Cardiac disease     Coronary artery disease     Diabetes mellitus ([facility] )     DJD (degenerative joint disease)     Gait disturbance     Generalized weakness     GERD (gastroesophageal reflux disease)     Gout     History of transfusion     Hyperlipidemia     Hypertension     Pressure injury of skin     Renal disorder      Present on Admission:   Weakness generalized   Essential hypertension   Neuroendocrine tumor   Biliary sludge   Acute kidney injury superimposed on chronic kidney disease (HCC)   Moderate protein-calorie malnutrition (HCC)   Iron deficiency anemia secondary to inadequate dietary iron intake      Admitting Diagnosis: UTI (urinary tract infection) [N39 0]  Weakness [R53 1]  Abdominal pain [R10 9]  Acute on chronic renal failure (HCC) [N17 9, N18 9]  Generalized weakness [R53 1]  Age/Sex: [de-identified] y o  male  Admission Orders:    Current Facility-Administered Medications:  acetaminophen 650 mg Oral Q6H PRN   amLODIPine 10 mg Oral Daily   aspirin 81 mg Oral Daily   b complex-vitamin C-folic acid 1 capsule Oral Daily With Dinner   cholecalciferol 1,000 Units Oral Daily   colchicine 0 6 mg Oral Daily   heparin (porcine) 5,000 Units Subcutaneous Q8H [facility]   insulin detemir 10 Units Subcutaneous HS   insulin lispro 1-5 Units Subcutaneous TID AC   insulin lispro 1-5 Units Subcutaneous HS   methenamine hippurate 1 g Oral BID   ondansetron 4 mg Intravenous Q6H PRN   oxyCODONE-acetaminophen 1 tablet Oral Q6H PRN   pantoprazole 40 mg Oral Early Morning   sodium chloride 125 mL/hr Intravenous Continuous   tamsulosin 0 4 mg Oral Daily With Dinner       IP CONSULT TO CASE MANAGEMENT  IP CONSULT TO ACUTE CARE SURGERY  IP CONSULT TO NEPHROLOGY  IP CONSULT TO CASE MANAGEMENT   SCDs  VS  Urine cx  PT/OT    Network Utilization Review Department  Phone: [phone]; Fax [phone]  [email or web address]  ATTENTION: Please call with any questions or concerns to [phone]  and carefully listen to the prompts so that you are directed to the right person  Send all requests for admission clinical reviews, approved or denied determinations and any other requests to fax [phone]   All voicemails are confidential

## 2019-09-16 NOTE — CONSULTS
Consultation - General Surgery   Brad Landers [de-identified] y o  male MRN: [id number]  Unit/Bed#: -01 Encounter: [id number]    Assessment/Plan     Assessment:  The patient is a debilitated 79-year-old white male with metastatic neuroendocrine tumor who underwent a percutaneous cholecystostomy for acute cholecystitis is documented by had a scan, approximately 10 days prior  The patient had sludge on his ultrasound, but no stones  Is done relatively well with regard to abdominal pain, elevated white count, fever and chills, however, the patient returns to emergency room with generalized weakness, he was unable to get out of bed, leading to his presentation to the emergency room  CT scan confirms of the gallbladder is decompressed, and at the bedside, it is draining clear bile, and flushes effort loosely with sterile saline  The patient has some discomfort at the percutaneous drain site, but his abdomen is otherwise soft and nontender with normal bowel sounds  He is reported to have normal bowel habits  Plan:  The patient is on emperic antibiotics for suspected urinary tract infection  Will discuss with Medical Oncology and interventional radiology treatment of his metastatic neuroendocrine tumor  The patient does not appear to be a candidate for general anesthetic, and the percutaneous cholecystostomy is functioning well, he may be a candidate for cholecystectomy if he responds well to treatment  History of Present Illness      History, ROS and PFSH obtained from the patient and from records  HPI:  Brad Landers is a [de-identified] y o  male who presents with profound weakness, leading to his hospitalization  The patient underwent percutaneous cholecystostomy a week ago for acute acalculous cholecystitis, which responded well to treatment  His liver function tests are normal, and CT scan confirms that the gallbladder is decompressed    Further the drain is putting out bile, and flushes effort loosely with sterile saline  The patient does have indwelling Garcia catheter and innumerable white cells on urinalysis  He also has history of diabetes and diminished renal function  Finally the patient is known to have 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 it is receiving monthly chemotherapy  The patient reports that the has a rather poor appetite, but is otherwise eating okay, and moving his bowels without difficulty  Inpatient consult to Acute Care Surgery  Consult performed by: Alin Rodriguez MD  Consult ordered by: Jessica Dimas PA-C          Review of Systems   Constitutional: Positive for appetite change (generally decreased) and fatigue  Negative for chills and fever  HENT: Negative for trouble swallowing  Respiratory: Negative for choking and shortness of breath  Cardiovascular: Negative for chest pain  Gastrointestinal: Negative for abdominal distention, anal bleeding, blood in stool, constipation, diarrhea and vomiting  Abdominal pain: some RUQ discomfort at cholecystostomy site  Genitourinary:        Chronic indwelling garcia catheter   Musculoskeletal: Negative for myalgias  Skin:        No symptoms of jaundice   Neurological: Positive for weakness and light-headedness  Psychiatric/Behavioral: The patient is nervous/anxious          Historical Information   Past Medical History:   Diagnosis Date    BPH (benign prostatic hyperplasia)     Cancer ([facility] )     liver cancer    Cardiac disease     Coronary artery disease     Diabetes mellitus ([facility] )     DJD (degenerative joint disease)     Gait disturbance     Generalized weakness     GERD (gastroesophageal reflux disease)     Gout     History of transfusion     Hyperlipidemia     Hypertension     Pressure injury of skin     Renal disorder      Past Surgical History:   Procedure Laterality Date    CORONARY ANGIOPLASTY WITH STENT PLACEMENT      IR IMAGE GUIDED BIOPSY/ASPIRATION LIVER  1/24/2019    IR TUBE PLACEMENT ROQUE  9/6/2019     Social History   Social History     Substance and Sexual Activity   Alcohol Use Never    Frequency: Never     Social History     Substance and Sexual Activity   Drug Use Never     Social History     Tobacco Use   Smoking Status Never Smoker   Smokeless Tobacco Never Used     Family History:   Family History   Problem Relation Age of Onset    Cancer Mother     Hypertension Father     Cancer Brother     Cancer Maternal Grandmother     Cancer Paternal Aunt        Meds/Allergies   current meds:   Current Facility-Administered Medications   Medication Dose Route Frequency    acetaminophen (TYLENOL) tablet 650 mg  650 mg Oral Q6H PRN    amLODIPine (NORVASC) tablet 10 mg  10 mg Oral Daily    aspirin chewable tablet 81 mg  81 mg Oral Daily    b complex-vitamin C-folic acid (NEPHROCAPS) capsule 1 capsule  1 capsule Oral Daily With Dinner    cholecalciferol (VITAMIN D3) tablet 1,000 Units  1,000 Units Oral Daily    colchicine (COLCRYS) tablet 0 6 mg  0 6 mg Oral Daily    heparin (porcine) subcutaneous injection 5,000 Units  5,000 Units Subcutaneous Q8H [address]    insulin detemir (LEVEMIR) subcutaneous injection 10 Units  10 Units Subcutaneous HS    insulin lispro (HumaLOG) 100 units/mL subcutaneous injection 1-5 Units  1-5 Units Subcutaneous TID AC    insulin lispro (HumaLOG) 100 units/mL subcutaneous injection 1-5 Units  1-5 Units Subcutaneous HS    methenamine hippurate (HIPREX) tablet 1 g  1 g Oral BID    ondansetron (ZOFRAN) injection 4 mg  4 mg Intravenous Q6H PRN    oxyCODONE-acetaminophen (PERCOCET) 5-325 mg per tablet 1 tablet  1 tablet Oral Q6H PRN    pantoprazole (PROTONIX) EC tablet 40 mg  40 mg Oral Early Morning    sodium chloride 0 9 % infusion  125 mL/hr Intravenous Continuous    tamsulosin (FLOMAX) capsule 0 4 mg  0 4 mg Oral Daily With Dinner    and PTA meds:   Prior to Admission Medications   Prescriptions Last Dose Informant Patient Reported? Taking?    amLODIPine (NORVASC) 10 mg tablet 9/15/2019 at Unknown time  Yes Yes   Sig: Take 10 mg by mouth daily   aspirin 81 MG tablet 9/15/2019 at Unknown time Self Yes Yes   Sig: Take 81 mg by mouth daily   b complex-vitamin C-folic acid (NEPHROCAPS) 1 mg capsule 9/14/2019 at Unknown time  No Yes   Sig: Take 1 capsule by mouth daily with dinner   cholecalciferol (VITAMIN D3) 1,000 units tablet 9/15/2019 at Unknown time  Yes Yes   Sig: Take 1,000 Units by mouth daily   colchicine (COLCRYS) 0 6 mg tablet 9/15/2019 at Unknown time  Yes Yes   Sig: Take 0 6 mg by mouth daily   insulin detemir (LEVEMIR) 100 units/mL subcutaneous injection   Yes No   Sig: Inject 10 Units under the skin daily at bedtime   insulin lispro (HumaLOG) 100 units/mL injection 9/15/2019 at Unknown time  Yes Yes   Sig: Inject under the skin 3 (three) times a day before meals   levofloxacin (LEVAQUIN) 250 mg tablet 9/15/2019 at Unknown time  No Yes   Sig: Take 1 tablet (250 mg total) by mouth every 24 hours for 5 days   methenamine hippurate (HIPREX) 1 g tablet 9/15/2019 at Unknown time  Yes Yes   Sig: Take 1 g by mouth 2 (two) times a day with meals   oxyCODONE-acetaminophen (PERCOCET) 5-325 mg per tablet Past Week at Unknown time  No Yes   Sig: Take 1 tablet by mouth every 6 (six) hours as needed for moderate pain for up to 10 daysMax Daily Amount: 4 tablets   pantoprazole (PROTONIX) 40 mg tablet 9/15/2019 at Unknown time Self No Yes   Sig: Take 1 tablet (40 mg total) by mouth daily in the early morning   tamsulosin (FLOMAX) 0 4 mg 9/15/2019 at Unknown time Self No Yes   Sig: Take 1 capsule (0 4 mg total) by mouth daily with dinner      Facility-Administered Medications: None     No Known Allergies    Objective   First Vitals:   Blood Pressure: 152/74 (09/15/19 1541)  Pulse: 93 (09/15/19 1541)  Temperature: 97 9 °F (36 6 °C) (09/15/19 1541)  Temp Source: Temporal (09/15/19 1541)  Respirations: 18 (09/15/19 1541)  Height: 5' 8" (172 7 cm) (09/15/19 1541)  Weight - Scale: 69 4 kg (153 lb) (09/15/19 1541)  SpO2: 100 % (09/15/19 1541)    Current Vitals:   Blood Pressure: 159/69 (09/16/19 1107)  Pulse: 81 (09/16/19 1107)  Temperature: 98 °F (36 7 °C) (09/16/19 1107)  Temp Source: Temporal (09/16/19 1107)  Respirations: 18 (09/16/19 1107)  Height: 5' 8" (172 7 cm) (09/15/19 1541)  Weight - Scale: 69 4 kg (153 lb) (09/15/19 1541)  SpO2: 98 % (09/16/19 1107)      Intake/Output Summary (Last 24 hours) at 9/16/2019 1415  Last data filed at 9/16/2019 1228  Gross per 24 hour   Intake 1100 ml   Output 1700 ml   Net -600 ml       Invasive Devices     Peripheral Intravenous Line            Peripheral IV 09/15/19 Right Antecubital less than 1 day          Drain            Urethral Catheter Latex 16 Fr  79 days    Closed/Suction Drain Right RUQ Bulb 10 2 Fr  9 days                Physical Exam   Constitutional: He is oriented to person, place, and time  Thin white male in no acute distress, alert and oriented   HENT:   Head: Normocephalic and atraumatic  Eyes: Pupils are equal, round, and reactive to light  No scleral icterus  Neck: Neck supple  Cardiovascular: Normal rate and regular rhythm  Pulmonary/Chest: Effort normal and breath sounds normal    Abdominal: Soft  He exhibits no distension  Tenderness: Mild RUQ tenderness  Indwelling percutaneous cholecystostomy draining clear bile, flushes effortlessly with sterile saline   Genitourinary:   Genitourinary Comments: Indwelling Haile   Musculoskeletal: He exhibits no edema  Neurological: He is alert and oriented to person, place, and time  Skin: Skin is warm and dry  Lab Results:   I have personally reviewed pertinent lab results    , CBC:   Lab Results   Component Value Date    WBC 11 60 (H) 09/16/2019    HGB 10 3 (L) 09/16/2019    HCT 31 2 (L) 09/16/2019    MCV 88 09/16/2019     09/16/2019    MCH 29 0 09/16/2019    MCHC 33 2 09/16/2019    RDW 14 6 (H) 09/16/2019    MPV 8 0 (L) 09/16/2019   , CMP:   Lab Results Component Value Date    SODIUM 139 09/16/2019    K 4 0 09/16/2019     (H) 09/16/2019    CO2 17 (L) 09/16/2019    BUN 56 (H) 09/16/2019    CREATININE 3 02 (H) 09/16/2019    CALCIUM 8 6 09/16/2019    AST 10 (L) 09/15/2019    ALT 14 09/15/2019    ALKPHOS 185 (H) 09/15/2019    EGFR 19 09/16/2019   , Urinalysis:   Lab Results   Component Value Date    COLORU Yellow 09/15/2019    CLARITYU Cloudy (A) 09/15/2019    SPECGRAV 1 020 09/15/2019    PHUR 5 0 09/15/2019    LEUKOCYTESUR 3+ (A) 09/15/2019    NITRITE Positive (A) 09/15/2019    GLUCOSEU Negative 09/15/2019    KETONESU Negative 09/15/2019    BILIRUBINUR Negative 09/15/2019    BLOODU 2+ (A) 09/15/2019   , Lipase:   Lab Results   Component Value Date    LIPASE 138 (H) 09/15/2019     Imaging: I have personally reviewed pertinent reports  and I have personally reviewed pertinent films in PACS  EKG, Pathology, and Other Studies: I have personally reviewed pertinent reports  Counseling / Coordination of Care  Total floor / unit time spent today 20 minutes  Greater than 50% of total time was spent with the patient and / or family counseling and / or coordination of care  A description of the counseling / coordination of care: Discussion with the patient, and  at bedside

## 2019-09-16 NOTE — PLAN OF CARE
Problem: PHYSICAL THERAPY ADULT  Goal: Performs mobility at highest level of function for planned discharge setting  See evaluation for individualized goals  Description  Treatment/Interventions: Functional transfer training, LE strengthening/ROM, Elevations, Therapeutic exercise, Endurance training, Patient/family training, Equipment eval/education, Bed mobility, Gait training, Spoke to nursing, Spoke to case management, OT  Equipment Recommended: Walker(continue RW use)       See flowsheet documentation for full assessment, interventions and recommendations  Note:   Prognosis: Fair  Problem List: Decreased strength, Decreased endurance, Impaired balance, Decreased mobility, Decreased safety awareness  Assessment: Pt is [de-identified] y o  male seen for PT evaluation on 9/16/2019 s/p admit to [facility] on 9/15/2019 w/ Weakness generalized  PT consulted to assess pt's functional mobility and d/c needs  Order placed for PT eval and tx, w/ up and OOB as tolerated order  Performed at least 2 patient identifiers during session: Name and wristband  Comorbidities affecting pt's physical performance at time of assessment include: generalized weakness, biliary sludge, moderate protein-calorie malnutrition, chronic indwelling garcia, SILVERIO on CKD stage 3, neuroendocrine tumor, iron deficiency anemia, essential HTN, DM type 2  PTA, pt was independent w/ all functional mobility w/ RW at baseline, ambulates household distances, has 2 BETH and lives w/ SO in 1 level home  Since recent d/c from [facility], pt has been bed-ridden  Personal factors affecting pt at time of IE include: ambulating w/ assistive device, stairs to enter home, inability to navigate level surfaces w/o external assistance, unable to perform dynamic tasks in community, positive fall history, decreased initiation and engagement, unable to perform physical activity, limited insight into impairments, inability to perform IADLs and inability to perform ADLs  Please find objective findings from PT assessment regarding body systems outlined above with impairments and limitations including weakness, impaired balance, decreased endurance, decreased activity tolerance, decreased functional mobility tolerance and fall risk, as well as mobility assessment (need for cueing for mobility technique)  The following objective measures performed on IE also reveal limitations: Barthel Index: 25/100  Pt's clinical presentation is currently unstable/unpredictable seen in pt's presentation of need for input for task focus and mobility technique and ongoing medical assessment  Pt to benefit from continued PT tx to address deficits as defined above and maximize level of functional independent mobility and consistency  From PT/mobility standpoint, recommendation at time of d/c would be STR pending progress in order to facilitate return to PLOF  Barriers to Discharge: Inaccessible home environment, Decreased caregiver support     Recommendation: Short-term skilled PT     PT - OK to Discharge: Yes(when medically cleared, if to STR)    See flowsheet documentation for full assessment

## 2019-09-16 NOTE — ASSESSMENT & PLAN NOTE
· Monthly chemo injection  · Follow up outpatient with Oncology, Dr Gemini Weller  · Patient missed chemo session this month due to being in the hospital

## 2019-09-16 NOTE — SOCIAL WORK
CM met with pt and SO Lui at bedside and explained role  Pt lives with his SO in a 1 story house; 3 BETH  Pt does not use any assistive device to ambulate  He has cane, walker, BSC and w/c at home  Pt reports his SO assists him with ADL's  Family provides transport  He indicates his SO or son will transport him home upon discharge  Pt PCP is Dr Onel Nguyen  He uses [address] in [place]  He denies any difficulty obtaining his medicitions  Pt denies any history of D&A treatment  Pt has a history of STR at [facility]  He is currently active with [facility] for SN, PT and HHA  STR recommended by PT after eval  CM discussed same with pt and SO  Pt is adamant he does not want STR at this time  He is adamant he wants to return home with his SOP and [facility] to continue  A post acute care recommendation was made by your care team for [facility]  Discussed [place] of Choice with patient  Choice is to return/continue services  Referral made to [facility] to continue services upon discharge  CM will continue to follow  CM reviewed d/c planning process including the following: identifying help at home, patient preference for d/c planning needs, availability of treatment team to discuss questions or concerns patient and/or family may have regarding understanding medications and recognizing signs and symptoms once discharged  CM also encouraged patient to follow up with all recommended appointments after discharge  Patient advised of importance for patient and family to participate in managing patients medical well being  Patient/caregiver received discharge checklist   Content reviewed  Patient/caregiver encouraged to participate in discharge plan of care prior to discharge home

## 2019-09-16 NOTE — PLAN OF CARE
Problem: INFECTION - ADULT  Goal: Absence or prevention of progression during hospitalization  Description  INTERVENTIONS:  - Assess and monitor for signs and symptoms of infection  - Monitor lab/diagnostic results  - Monitor all insertion sites, i e  indwelling lines, tubes, and drains  - Monitor endotracheal if appropriate and nasal secretions for changes in amount and color  - [place] appropriate cooling/warming therapies per order  - Administer medications as ordered  - Instruct and encourage patient and family to use good hand hygiene technique  - Identify and instruct in appropriate isolation precautions for identified infection/condition  Outcome: Progressing  Goal: Absence of fever/infection during neutropenic period  Description  INTERVENTIONS:  - Monitor WBC    Outcome: Progressing

## 2019-09-16 NOTE — ASSESSMENT & PLAN NOTE
· Likely multifactorial, given patient's recent cholecystostomy tube, acute renal failure, and neuroendocrine tumor  · Will continue PT/OT  · Supportive care for the above-mentioned medical problems

## 2019-09-16 NOTE — ASSESSMENT & PLAN NOTE
· Chronic kidney disease stage 3  · Baseline creatinine appears to be around 1 5  · Nephrology input appreciated  · Will continue IV fluids

## 2019-09-16 NOTE — ASSESSMENT & PLAN NOTE
· Patient with right upper quadrant showing sludge, HIDA scan indicative of cholecystitis  · He had percutaneous cholecystectomy on 09/06/2019 and will need a full cholecystectomy in the future  · He has completed his 5 days of p o   Levaquin  · Will consult surgery for evaluation if procedure is needed at this time as a source for his weakness and fatigue

## 2019-09-16 NOTE — OCCUPATIONAL THERAPY NOTE
Occupational Therapy Evaluation      Eileen Cárdenas    9/16/2019    Patient Active Problem List   Diagnosis    Weakness generalized    Type 2 diabetes mellitus with stage 3 chronic kidney disease, with long-term current use of insulin (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 )    Atherosclerosis of artery of extremity with ulceration (HCC)    Carcinoid syndrome (HCC)    Acute cystitis with hematuria    Chronic indwelling Haile catheter    Moderate protein-calorie malnutrition (HCC)    Biliary sludge    Urinary tract infection due to extended-spectrum beta lactamase (ESBL) producing Escherichia coli    Malignant neuroendocrine neoplasm (HCC)    Generalized abdominal pain    Urinary retention       Past Medical History:   Diagnosis Date    BPH (benign prostatic hyperplasia)     Cancer ([facility] )     liver cancer    Cardiac disease     Coronary artery disease     Diabetes mellitus ([facility] )     DJD (degenerative joint disease)     Gait disturbance     Generalized weakness     GERD (gastroesophageal reflux disease)     Gout     History of transfusion     Hyperlipidemia     Hypertension     Pressure injury of skin     Renal disorder        Past Surgical History:   Procedure Laterality Date    CORONARY ANGIOPLASTY WITH STENT PLACEMENT      IR IMAGE GUIDED BIOPSY/ASPIRATION LIVER  1/24/2019    IR TUBE PLACEMENT ROQUE  9/6/2019 09/16/19 1133   Note Type   Note type Eval only   Restrictions/Precautions   Weight Bearing Precautions Per Order No Other Precautions Contact/isolation;Multiple lines; Fall Risk  (BONILLA Drain)   Pain Assessment   Pain Assessment No/denies pain   Pain Score No Pain   Home Living   Type of [address] One level;Performs ADLs on one level; Able to live on main level with bedroom/bathroom;Stairs to enter with rails  (2 BETH)   Bathroom Shower/Tub Tub/shower unit   Bathroom Toilet Standard   Bathroom Equipment Shower chair   [address] Walker   Prior Function   Level of [place] Needs assistance with ADLs and functional mobility; Needs assistance with IADLs   Lives With Significant other   Receives Help From Family   ADL Assistance Needs assistance   IADLs Needs assistance   Falls in the last 6 months 1 to 4   Vocational Retired   Comments pt's gf reporting since recent d/c from [facility], pt has not been able to get OOB since Wednesday, 9/11/19   Lifestyle   Autonomy Patient reporting at baseline being independent with ADLs, ambulatory with RW, and lives with friend in a one story house with 2 BETH   Reciprocal Relationships girlfriend   ADL   Eating Assistance 5  Supervision/Setup   Grooming Assistance 5  Supervision/Setup   UB Bathing Assistance 3  Moderate Assistance   LB Bathing Assistance 2  Maximal Assistance   [address] 3  Moderate Assistance    [facility] 2  Maximal [address]  2  Maximal Assistance   Bed Mobility   Supine to Sit 3  Moderate assistance   Additional items Assist x 2; Increased time required;Verbal cues;LE management   Transfers   Sit to Stand 3  Moderate assistance   Additional items Assist x 2; Increased time required;Verbal cues   Stand to Sit 3  Moderate assistance   Additional items Assist x 2; Increased time required;Verbal cues   Functional Mobility   Functional Mobility 3  Moderate assistance   Additional Comments Assist x 2   Additional items Rolling walker   Balance   Static Sitting Fair   Dynamic Sitting Fair - Static Standing Poor +   Dynamic Standing Poor +   Activity Tolerance   Activity Tolerance Patient limited by fatigue   Nurse Made Aware RN verbalized pt approprite for therapy    RUE Assessment   RUE Assessment WFL  (3+/5)   LUE Assessment   LUE Assessment WFL  (3+/5)   Hand Function   Gross Motor Coordination Functional   Fine Motor Coordination Functional   Cognition   Overall Cognitive Status WFL   Arousal/Participation Alert; Responsive; Cooperative   Attention Within functional limits   Orientation Level Oriented X4   Memory Decreased recall of recent events;Decreased recall of precautions   Following Commands Follows one step commands without difficulty   Comments Mini-Cog assessment performed today  Version 1 was given  Patient able to recall 1/3 words  Patient able to draw a normal clock with the correct time  Total score of test was 3/5 indicating  further screening of cognition is recommended  Cognition Assessment Tools   (mini-cog)   Score 3   Assessment   Limitation Decreased ADL status; Decreased UE strength;Decreased cognition;Decreased endurance;Decreased self-care trans;Decreased high-level ADLs   Assessment Pt is a [de-identified] y o  male who was admitted to [address] [place] [address] on 9/15/2019 with Weakness generalized  At this time, patient is also affected by the comorbidities of: metabolic acidosis, anemia, HTN, neuroendocrine tumor, SILVERIO, DM and urinary retention  Additionally, patient is affected by the following personal factors:steps to enter environment, difficulty performing ADLS, difficulty performing IADLS  and decreased initiation and engagement   Orders placed for OT evaluation/treatment with up and OOB as tolerated orders  Prior to admission, Patient reporting at baseline being independent with ADLs, ambulatory with RW, and lives with friend in a one story house with 2 BETH  Upon OT evaluation, patient requires moderate assist for UB ADLs and maximum assist for LB ADLs   Occupational performance is affected by the following deficits: decreased strength, decreased balance, decreased tolerance, impaired initiation, impaired memory, impaired sequencing and impaired problem solving  Based on the following information, patient would benefit from continued skilled OT treatment while in the hospital to address deficits and maximize level of functional independence with ADL's and functional mobility  Occupational Performance areas to address include: eating, grooming, bathing/shower, toilet hygiene, dressing, functional mobility and clothing management  From OT standpoint, recommendation at time of d/c would be short term rehab  Goals   Patient Goals to go home    Plan   Treatment Interventions ADL retraining;Functional transfer training;UE strengthening/ROM; Endurance training;Cognitive reorientation;Patient/family training;Equipment evaluation/education; Neuromuscular reeducation; Compensatory technique education   Goal Expiration Date 09/26/19   Treatment Day 0   OT Frequency 3-5x/wk   Recommendation   OT Discharge Recommendation Short Term Rehab   OT - OK to Discharge Yes  (Once medically cleared)   Barthel Index   Feeding 5   Bathing 0   Grooming Score 0   Dressing Score 5   Bladder Score 0   Bowels Score 5   Toilet Use Score 5   Transfers (Bed/Chair) Score 5   Mobility (Level Surface) Score 0   Stairs Score 0   Barthel Index Score 25     GOALS:     Pt will achieve the following within specified time frame: STG  Pt will achieve the following goals within 5 days     *ADL transfers with Min (A) for inc'd independence with ADLs/purposeful tasks     *UB ADL with Min (A) for inc'd independence with self cares     *LB ADL with Min (A) using AE prn for inc'd independence with self cares     *Toileting with Min (A) for clothing management and hygiene for return to OF with personal care     *Increase static stand balance to fair and dyn stand balance to fair for inc'd safety with standing purposeful tasks     *Increase stand tolerance x5 m for inc'd tolerance with standing purposeful tasks     *Participate in 10m UE therex to increase overall stamina/activity tolerance for purposeful tasks     *Bed mobility- Min (A) for inc'd independence to manage own comfort and initiate EOB & OOB purposeful tasks        Pt will achieve the following within specified time frame: LTG  Pt will achieve the following goals within 10 days     *ADL transfers with (S) for inc'd independence with ADLs/purposeful tasks     *Self Feeding- (I) for inc'd independence with providing self nourishment     *UB ADL with (I) for inc'd independence with self cares     *LB ADL with (S) using AE prn for inc'd independence with self cares     *Toileting with (S) for clothing management and hygiene for return to PLOF with personal care     *Increase static stand balance to good and dyn stand balance to fair+ for inc'd safety with standing purposeful tasks     *Increase stand tolerance x10 m for inc'd tolerance with standing purposeful tasks     *Bed mobility- (I) for inc'd independence to manage own comfort and initiate EOB & OOB purposeful tasks     *Patient will increase OOB/sitting tolerance to 2-4 hours per day to increase participation in self-care and leisure tasks with no s/s of exertion             Bryson Centeno MS, OTR/L

## 2019-09-16 NOTE — ASSESSMENT & PLAN NOTE
· Unclear etiology  · Patient has history of gallstones and was on antibiotics possible source he also has chronic indwelling Haile catheter and history of ESBL  · He was started on ertapenem in the ER  · Follow-up cultures, send procalcitonin level check blood cultures  · Will hold further antibiotics - lactic acid normal

## 2019-09-17 LAB
ALBUMIN SERPL BCP-MCNC: 3 G/DL (ref 3.5–5.7)
ALP SERPL-CCNC: 189 U/L (ref 55–165)
ALT SERPL W P-5'-P-CCNC: 17 U/L (ref 7–52)
ANION GAP SERPL CALCULATED.3IONS-SCNC: 11 MMOL/L (ref 4–13)
AST SERPL W P-5'-P-CCNC: 14 U/L (ref 13–39)
BASOPHILS # BLD AUTO: 0.1 THOUSANDS/ΜL (ref 0–0.1)
BASOPHILS NFR BLD AUTO: 1 % (ref 0–2)
BILIRUB SERPL-MCNC: 0.4 MG/DL (ref 0.2–1)
BUN SERPL-MCNC: 48 MG/DL (ref 7–25)
CALCIUM SERPL-MCNC: 8.3 MG/DL (ref 8.6–10.5)
CHLORIDE SERPL-SCNC: 113 MMOL/L (ref 98–107)
CO2 SERPL-SCNC: 14 MMOL/L (ref 21–31)
CREAT SERPL-MCNC: 2.62 MG/DL (ref 0.7–1.3)
EOSINOPHIL # BLD AUTO: 0.1 THOUSAND/ΜL (ref 0–0.61)
EOSINOPHIL NFR BLD AUTO: 1 % (ref 0–5)
ERYTHROCYTE [DISTWIDTH] IN BLOOD BY AUTOMATED COUNT: 14.5 % (ref 11.5–14.5)
GFR SERPL CREATININE-BSD FRML MDRD: 22 ML/MIN/1.73SQ M
GLUCOSE SERPL-MCNC: 201 MG/DL (ref 65–140)
GLUCOSE SERPL-MCNC: 204 MG/DL (ref 65–99)
GLUCOSE SERPL-MCNC: 266 MG/DL (ref 65–140)
GLUCOSE SERPL-MCNC: 275 MG/DL (ref 65–140)
GLUCOSE SERPL-MCNC: 388 MG/DL (ref 65–140)
HCT VFR BLD AUTO: 29.3 % (ref 42–47)
HGB BLD-MCNC: 9.7 G/DL (ref 14–18)
LYMPHOCYTES # BLD AUTO: 1.1 THOUSANDS/ΜL (ref 0.6–4.47)
LYMPHOCYTES NFR BLD AUTO: 10 % (ref 21–51)
MCH RBC QN AUTO: 28.8 PG (ref 26–34)
MCHC RBC AUTO-ENTMCNC: 33.1 G/DL (ref 31–37)
MCV RBC AUTO: 87 FL (ref 81–99)
MONOCYTES # BLD AUTO: 0.8 THOUSAND/ΜL (ref 0.17–1.22)
MONOCYTES NFR BLD AUTO: 7 % (ref 2–12)
NEUTROPHILS # BLD AUTO: 9.2 THOUSANDS/ΜL (ref 1.4–6.5)
NEUTS SEG NFR BLD AUTO: 82 % (ref 42–75)
PLATELET # BLD AUTO: 238 THOUSANDS/UL (ref 149–390)
PMV BLD AUTO: 7.6 FL (ref 8.6–11.7)
POTASSIUM SERPL-SCNC: 4 MMOL/L (ref 3.5–5.5)
PROT SERPL-MCNC: 6.2 G/DL (ref 6.4–8.9)
RBC # BLD AUTO: 3.35 MILLION/UL (ref 4.3–5.9)
SODIUM SERPL-SCNC: 138 MMOL/L (ref 134–143)
WBC # BLD AUTO: 11.2 THOUSAND/UL (ref 4.8–10.8)

## 2019-09-17 PROCEDURE — 85025 COMPLETE CBC W/AUTO DIFF WBC: CPT | Performed by: INTERNAL MEDICINE

## 2019-09-17 PROCEDURE — 99232 SBSQ HOSP IP/OBS MODERATE 35: CPT | Performed by: INTERNAL MEDICINE

## 2019-09-17 PROCEDURE — 99232 SBSQ HOSP IP/OBS MODERATE 35: CPT | Performed by: SPECIALIST

## 2019-09-17 PROCEDURE — 99233 SBSQ HOSP IP/OBS HIGH 50: CPT | Performed by: INTERNAL MEDICINE

## 2019-09-17 PROCEDURE — 82948 REAGENT STRIP/BLOOD GLUCOSE: CPT

## 2019-09-17 PROCEDURE — 80053 COMPREHEN METABOLIC PANEL: CPT | Performed by: INTERNAL MEDICINE

## 2019-09-17 RX ORDER — SODIUM BICARBONATE 650 MG/1
650 TABLET ORAL
Status: DISCONTINUED | OUTPATIENT
Start: 2019-09-17 | End: 2019-09-18 | Stop reason: HOSPADM

## 2019-09-17 RX ADMIN — METHENAMINE HIPPURATE 1 G: 1 TABLET ORAL at 09:19

## 2019-09-17 RX ADMIN — METHENAMINE HIPPURATE 1 G: 1 TABLET ORAL at 17:54

## 2019-09-17 RX ADMIN — INSULIN DETEMIR 10 UNITS: 100 INJECTION, SOLUTION SUBCUTANEOUS at 21:22

## 2019-09-17 RX ADMIN — INSULIN LISPRO 1 UNITS: 100 INJECTION, SOLUTION INTRAVENOUS; SUBCUTANEOUS at 09:18

## 2019-09-17 RX ADMIN — SODIUM BICARBONATE 650 MG: 650 TABLET ORAL at 17:54

## 2019-09-17 RX ADMIN — INSULIN LISPRO 3 UNITS: 100 INJECTION, SOLUTION INTRAVENOUS; SUBCUTANEOUS at 12:11

## 2019-09-17 RX ADMIN — HEPARIN SODIUM 5000 UNITS: 5000 INJECTION INTRAVENOUS; SUBCUTANEOUS at 14:04

## 2019-09-17 RX ADMIN — PANTOPRAZOLE SODIUM 40 MG: 40 TABLET, DELAYED RELEASE ORAL at 06:32

## 2019-09-17 RX ADMIN — AMLODIPINE BESYLATE 10 MG: 5 TABLET ORAL at 09:19

## 2019-09-17 RX ADMIN — ASPIRIN 81 MG 81 MG: 81 TABLET ORAL at 09:19

## 2019-09-17 RX ADMIN — VITAMIN D, TAB 1000IU (100/BT) 1000 UNITS: 25 TAB at 09:19

## 2019-09-17 RX ADMIN — INSULIN LISPRO 2 UNITS: 100 INJECTION, SOLUTION INTRAVENOUS; SUBCUTANEOUS at 21:22

## 2019-09-17 RX ADMIN — HEPARIN SODIUM 5000 UNITS: 5000 INJECTION INTRAVENOUS; SUBCUTANEOUS at 06:35

## 2019-09-17 RX ADMIN — SODIUM BICARBONATE 650 MG: 650 TABLET ORAL at 09:19

## 2019-09-17 RX ADMIN — Medication 1 CAPSULE: at 17:54

## 2019-09-17 RX ADMIN — TAMSULOSIN HYDROCHLORIDE 0.4 MG: 0.4 CAPSULE ORAL at 17:54

## 2019-09-17 RX ADMIN — COLCHICINE 0.6 MG: 0.6 TABLET, FILM COATED ORAL at 09:19

## 2019-09-17 RX ADMIN — SODIUM CHLORIDE 125 ML/HR: 9 INJECTION, SOLUTION INTRAVENOUS at 04:17

## 2019-09-17 RX ADMIN — HEPARIN SODIUM 5000 UNITS: 5000 INJECTION INTRAVENOUS; SUBCUTANEOUS at 21:22

## 2019-09-17 RX ADMIN — INSULIN LISPRO 4 UNITS: 100 INJECTION, SOLUTION INTRAVENOUS; SUBCUTANEOUS at 17:55

## 2019-09-17 NOTE — UTILIZATION REVIEW
Notification of Discharge  This is a Notification of Discharge from our facility [address]  Please be advised that this patient has been discharge from our facility  Below you will find the admission and discharge date and time including the patients disposition  PRESENTATION DATE: 9/4/2019  3:03 AM  OBS ADMISSION DATE:   IP ADMISSION DATE: 9/4/19 0658   DISCHARGE DATE: 9/10/2019 12:55 PM  [facility]   Admission Orders listed below:  Admission Orders (From admission, onward)     Ordered        09/04/19 0658  Inpatient Admission (expected length of stay for this patient Order details is greater than two midnights)  Once                   Please contact the UR Department if additional information is required to close this patient's authorization/case  [address]  Utilization Review Department  Phone: [phone]; Fax [phone]  [email or web address]  ATTENTION: Please call with any questions or concerns to [phone]  and carefully listen to the prompts so that you are directed to the right person  Send all requests for admission clinical reviews, approved or denied determinations and any other requests to fax [phone]   All voicemails are confidential

## 2019-09-17 NOTE — PROGRESS NOTES
Progress Note - General Surgery   Alin Felix [de-identified] y o  male MRN: [id number]  Unit/Bed#: -01 Encounter: [id number]    Assessment:  Patient is resting quietly, sleeping, not disturbed   at bedside reports he has a poor appetite  Cholecystostomy appears to be functioning fine    Plan:  Discussed with Dr Mehran Chávez  The patient has a low grade neuroendocrine tumor which hasn't enlarged over the last year  His symptoms appear to be controled with Lanreotide  His current illness appears to be of much greater concern, and given his co-morbidities including DM and Chronic renal insuffiencey, there is no reason to be more aggressive with the gallbladder, or with the neuroendocrine tumor treatment  Continue Cholecystostomy to bulb suction  Subjective/Objective   Chief Complaint:  reports poor appetite    Subjective: Appears weak and frail    Objective:     Blood pressure (!) 172/79, pulse 80, temperature 97 9 °F (36 6 °C), temperature source Temporal, resp  rate 18, height 5' 8" (1 727 m), weight 69 4 kg (153 lb), SpO2 99 %  ,Body mass index is 23 26 kg/m²  Intake/Output Summary (Last 24 hours) at 9/17/2019 1109  Last data filed at 9/17/2019 0911  Gross per 24 hour   Intake 1240 ml   Output 3245 ml   Net -2005 ml       Invasive Devices     Peripheral Intravenous Line            Peripheral IV 09/15/19 Right Antecubital 1 day          Drain            Urethral Catheter Latex 16 Fr  80 days    Closed/Suction Drain Right RUQ Bulb 10 2 Fr  10 days                Physical Exam: No exam performed today, Patient napping      Lab, Imaging and other studies:  CBC: No results found for: WBC, HGB, HCT, MCV, PLT, ADJUSTEDWBC, MCH, MCHC, RDW, MPV, NRBC, CMP:   Lab Results   Component Value Date    SODIUM 138 09/17/2019    K 4 0 09/17/2019     (H) 09/17/2019    CO2 14 (L) 09/17/2019    BUN 48 (H) 09/17/2019    CREATININE 2 62 (H) 09/17/2019    CALCIUM 8 3 (L) 09/17/2019    AST 14 09/17/2019    ALT 17 09/17/2019    ALKPHOS 189 (H) 09/17/2019    EGFR 22 09/17/2019     VTE Pharmacologic Prophylaxis: Heparin  VTE Mechanical Prophylaxis: sequential compression device

## 2019-09-17 NOTE — ASSESSMENT & PLAN NOTE
· Chronic kidney disease stage 3  · Baseline creatinine appears to be around 1 5  · Nephrology input appreciated  · Creatinine improving  · IV fluids as needed

## 2019-09-17 NOTE — SOCIAL WORK
Chart reviewed, I met with the pt and his significant other to make them aware of pt dept recommendation A post acute care recommendation was made by your care team for STR  Discussed Freedom of Choice with both patient and caregiver  Choice is to make a new referral  [facility] [place] as [facility]  , pt;s 1st choice is [place], authorization will be needed, cm will continue to follow, d/c plan was discussed at care coordination rounds today

## 2019-09-17 NOTE — NURSING NOTE
Pt stating that he feels like he has to pee and he is having abdominal pain  Urine from chronic garcia is yellow with sediment  Bladder scanned for 223 mL  Spoke with hospitalist  Order to irrigate garcia  Garcia irrigated with 60 cc Sterile water and garcia tubing and bag changed  Return of 300 cc yellow urine into bag immediately  Will continue to monitor

## 2019-09-17 NOTE — ASSESSMENT & PLAN NOTE
· Hemoglobin with slight decreased from admission  · No signs of acute bleeding  · Will monitor H&H  · Transfuse as needed

## 2019-09-17 NOTE — PROGRESS NOTES
Progress Note - Guru Magaña 1940, [de-identified] y o  male MRN: [id number]    Unit/Bed#: -01 Encounter: [id number]    Primary Care Provider: Samantha Moreno DO   Date and time admitted to hospital: 9/15/2019  3:37 PM        * Acute kidney injury superimposed on chronic kidney disease ([facility] )  Assessment & Plan  · Chronic kidney disease stage 3  · Baseline creatinine appears to be around 1 5  · Nephrology input appreciated  · Creatinine improving  · IV fluids as needed    Neuroendocrine tumor  Assessment & Plan  · Monthly chemo injection  · Follow up outpatient with Oncology, Dr Denis Clayton  · Patient missed chemo session this month due to being in the hospital    Weakness generalized  Assessment & Plan  · Likely multifactorial, given patient's recent cholecystostomy tube, acute renal failure, and neuroendocrine tumor  · Will continue PT/OT  · Supportive care for the above-mentioned medical problems    Biliary sludge  Assessment & Plan  · Patient with right upper quadrant showing sludge, HIDA scan indicative of cholecystitis  · He had percutaneous cholecystostomy on 09/06/2019 and will need a full cholecystectomy in the future  · Patient currently on antibiotics  · Surgery input appreciated    Moderate protein-calorie malnutrition ([facility] )  Assessment & Plan  Malnutrition Findings:        secondary to chronic disease    BMI Findings: Body mass index is 23 26 kg/m²     Consult dietitian    Chronic indwelling Haile catheter  Assessment & Plan  · Continue chronic Haile catheter    Iron deficiency anemia secondary to inadequate dietary iron intake  Assessment & Plan  · Hemoglobin with slight decreased from admission  · No signs of acute bleeding  · Will monitor H&H  · Transfuse as needed    Essential hypertension  Assessment & Plan  · Continue meds and monitor    Type 2 diabetes mellitus with stage 3 chronic kidney disease, with long-term current use of insulin (HCC)  Assessment & Plan  · continue insulin coverage monitoring blood sugars      VTE Pharmacologic Prophylaxis: Pharmacologic: Heparin    Patient Centered Rounds: I have performed bedside rounds with nursing staff today  Discussions with Specialists or Other Care Team Provider: yes  Education and Discussions with Family / Patient: yes    Current Length of Stay: 2 day(s)    Current Patient Status: Inpatient   Certification Statement: The patient will continue to require additional inpatient hospital stay due to Acute kidney injury    Discharge Plan:  Pending hospital course, case management working on SNF placement    Code Status: Level 1 - Full Code    Subjective:   No overnight events noted  Patient continues to have generalized weakness  Patient denies any pain complaints  No nausea or vomiting  Patient is tolerating diet  Afebrile  Objective:     Vitals:   Temp (24hrs), Av 8 °F (36 6 °C), Min:97 6 °F (36 4 °C), Max:97 9 °F (36 6 °C)    Temp:  [97 6 °F (36 4 °C)-97 9 °F (36 6 °C)] 97 9 °F (36 6 °C)  HR:  [76-80] 79  Resp:  [17-18] 17  BP: (164-172)/(70-79) 164/70  SpO2:  [97 %-99 %] 97 %  Body mass index is 23 26 kg/m²  Input and Output Summary (last 24 hours): Intake/Output Summary (Last 24 hours) at 2019 1630  Last data filed at 2019 1512  Gross per 24 hour   Intake 1240 ml   Output 3305 ml   Net -2065 ml       Physical Exam:     Physical Exam   Constitutional: No distress  Frail elderly male   HENT:   Head: Normocephalic and atraumatic  Eyes: Conjunctivae and EOM are normal    Neck: Normal range of motion  Neck supple  Cardiovascular: Normal rate and regular rhythm  Pulmonary/Chest: Effort normal  No respiratory distress  Abdominal: Soft  He exhibits no distension  There is no tenderness  Genitourinary:   Genitourinary Comments: Haile catheter in place   Musculoskeletal: Normal range of motion  He exhibits no edema  Neurological: He is alert  No cranial nerve deficit  Skin: Skin is warm and dry     Psychiatric: He exhibits a depressed mood  Additional Data:     Labs:    Results from last 7 days   Lab Units 09/17/19  1106   WBC Thousand/uL 11 20*   HEMOGLOBIN g/dL 9 7*   HEMATOCRIT % 29 3*   PLATELETS Thousands/uL 238   NEUTROS PCT % 82*   LYMPHS PCT % 10*   MONOS PCT % 7   EOS PCT % 1     Results from last 7 days   Lab Units 09/17/19  0631   POTASSIUM mmol/L 4 0   CHLORIDE mmol/L 113*   CO2 mmol/L 14*   BUN mg/dL 48*   CREATININE mg/dL 2 62*   CALCIUM mg/dL 8 3*   ALK PHOS U/L 189*   ALT U/L 17   AST U/L 14         Results from last 7 days   Lab Units 09/17/19  1109 09/17/19  0618 09/16/19  2101 09/16/19  1640 09/16/19  1106 09/16/19  0623 09/15/19  2117   POC GLUCOSE mg/dl 275* 201* 263* 312* 265* 142* 149*           * I Have Reviewed All Lab Data Listed Above  * Additional Pertinent Lab Tests Reviewed: Laura 66 Admission  Reviewed    Imaging:  Imaging Reports Reviewed Today Include:  No new imaging    Recent Cultures (last 7 days):     Results from last 7 days   Lab Units 09/15/19  2143 09/15/19  1607   BLOOD CULTURE  No Growth at 24 hrs  No Growth at 24 hrs   --    URINE CULTURE   --  Culture results to follow         Last 24 Hours Medication List:     Current Facility-Administered Medications:  acetaminophen 650 mg Oral Q6H PRN Vandana RL Bonilla-C   amLODIPine 10 mg Oral Daily Vandana Dolin, PA-C   aspirin 81 mg Oral Daily Vandana Dolin, PA-C   b complex-vitamin C-folic acid 1 capsule Oral Daily With Dinner Vandana RL Bonilla-C   cholecalciferol 1,000 Units Oral Daily Vandana DolRL gonzalez-C   colchicine 0 6 mg Oral Daily Vandana Dolin, PA-C   heparin (porcine) 5,000 Units Subcutaneous Q8H [address] Amalia Doran PA-C   insulin detemir 10 Units Subcutaneous HS RL Robles-FOUZIA   insulin lispro 1-5 Units Subcutaneous TID AC RL Turpin-FOUZIA   insulin lispro 1-5 Units Subcutaneous HS Amalia Doran PA-C   methenamine hippurate 1 g Oral BID Vandana Bonilla PA-C ondansetron 4 mg Intravenous Q6H PRN Adriane Schwab, PA-C   oxyCODONE-acetaminophen 1 tablet Oral Q6H PRN Adriane Schwab, PA-C   pantoprazole 40 mg Oral Early Morning Adriane Schwab, PA-C   sodium bicarbonate 650 mg Oral BID after meals Silvino Rue, DO   tamsulosin 0 4 mg Oral Daily With Dinner Adriane Schwab, PA-C        Today, Patient Was Seen By: Keke Miles MD    ** Please Note: Dictation voice to text software may have been used in the creation of this document   **

## 2019-09-17 NOTE — ASSESSMENT & PLAN NOTE
· Monthly chemo injection  · Follow up outpatient with Oncology, Dr Danelle Archer  · Patient missed chemo session this month due to being in the hospital

## 2019-09-17 NOTE — PROGRESS NOTES
Progress Note - Nephrology   Bronwyn Silva [de-identified] y o  male MRN: [id number]  Unit/Bed#: -01 Encounter: [id number]    A/P:  1  Acute renal failure due to acute tubular necrosis             Urine studies confirmed patient continues to be in acute tubular necrosis, with this morning's creatinine shown significant improvement  Continue with supportive care, continue to avoid nephrotoxins  2  Chronic kidney disease stage 3 with baseline creatinine likely around 1 4 - 1 5 mg/dL             As mentioned consultation, will need to keep a close eye on the patient's kidney function with the hope to be able to return back down to his baseline creatinine mentioned above  3  Probable diabetic nephropathy  4  Proteinuria               continue supportive care, no renally directed acute Care indicated at this time  5  Acidosis                Patient's serum bicarbonate continues to worsen, will supplement bicarbonate  Patient may be experiencing a renal tubular acidosis from the chronic obstruction adjust resolved  No documentation of diarrhea at this time  6  Neuroendocrine tumor               Continue follow-up with Dr Nguyen Red is continue with Lanreotide injections every 4 weeks for now   =============  7  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              Continue Haile catheter    8  Right hydroureter nephrosis              Resolved      Follow up reason for today's visit:  Acute renal failure/chronic kidney disease    Weakness generalized    Patient Active Problem List   Diagnosis    Weakness generalized    Type 2 diabetes mellitus with stage 3 chronic kidney disease, with long-term current use of insulin ([address] )    Essential hypertension    Mixed hyperlipidemia    Cardiac disease    Generalized abdominal mass    Hydroureter    Metabolic acidosis    Iron deficiency anemia secondary to inadequate dietary iron intake    Accelerated hypertension    Liver mass    Neuroendocrine tumor    Acute cystitis without hematuria    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)    Acute kidney injury superimposed on chronic kidney disease (HCC)    Pressure injury of right heel, unstageable (HCC)    Atherosclerosis of artery of extremity with ulceration (HCC)    Carcinoid syndrome (HCC)    Acute cystitis with hematuria    Chronic indwelling Garcia catheter    Moderate protein-calorie malnutrition (HCC)    Biliary sludge    Urinary tract infection due to extended-spectrum beta lactamase (ESBL) producing Escherichia coli    Malignant neuroendocrine neoplasm (HCC)    Generalized abdominal pain    Urinary retention         Subjective:   No acute events overnight    Objective:     Vitals: Blood pressure (!) 172/79, pulse 80, temperature 97 9 °F (36 6 °C), temperature source Temporal, resp  rate 18, height 5' 8" (1 727 m), weight 69 4 kg (153 lb), SpO2 99 %  ,Body mass index is 23 26 kg/m²  Weight (last 2 days)     Date/Time   Weight    09/15/19 1541   69 4 (153)                Intake/Output Summary (Last 24 hours) at 9/17/2019 0811  Last data filed at 9/17/2019 0711  Gross per 24 hour   Intake 1240 ml   Output 3195 ml   Net -1955 ml     I/O last 3 completed shifts: In: 6418 [P O :240; I V :2000]  Out: 3955 [Urine:3400; Drains:555]    Urethral Catheter Latex 16 Fr   (Active)   Reasons to continue Urinary Catheter  Chronic urinary catheter 9/16/2019  8:00 AM   Goal for Removal N/A- chronic gacria 9/16/2019  8:00 AM   Site Assessment Clean;Skin intact 9/16/2019  8:00 AM   Collection Container Standard drainage bag 9/16/2019  8:00 AM   Securement Method Securing device (Describe) 9/16/2019  8:00 AM   Output (mL) 900 mL 9/17/2019  7:11 AM       Physical Exam: BP (!) 172/79 (BP Location: Right arm)   Pulse 80   Temp 97 9 °F (36 6 °C) (Temporal)   Resp 18   Ht 5' 8" (1 727 m)   Wt 69 4 kg (153 lb)   SpO2 99%   BMI 23 26 kg/m²     General Appearance:    Alert, cooperative, no distress, appears stated age   Head:    Normocephalic, without obvious abnormality, atraumatic   Eyes:    Conjunctiva/corneas clear   Ears:    Normal external ears   Nose:   Nares normal, septum midline, mucosa normal, no drainage    or sinus tenderness   Throat:   Lips, mucosa, and tongue normal; teeth and gums normal   Neck:   Supple   Back:     Symmetric, no curvature, ROM normal, no CVA tenderness   Lungs:     Clear to auscultation bilaterally, respirations unlabored   Chest wall:    No tenderness or deformity   Heart:    Regular rate and rhythm, S1 and S2 normal, no murmur, rub   or gallop   Abdomen:     Soft, non-tender, bowel sounds active   Extremities:   Extremities normal, atraumatic, no cyanosis or edema   Skin:   Skin color, texture, turgor normal, no rashes or lesions   Lymph nodes:   Cervical normal   Neurologic:   CNII-XII intact            Lab, Imaging and other studies: I have personally reviewed pertinent labs  CBC: No results found for: WBC, HGB, HCT, MCV, PLT, ADJUSTEDWBC, MCH, MCHC, RDW, MPV, NRBC  CMP:   Lab Results   Component Value Date    K 4 0 09/17/2019     (H) 09/17/2019    CO2 14 (L) 09/17/2019    BUN 48 (H) 09/17/2019    CREATININE 2 62 (H) 09/17/2019    CALCIUM 8 3 (L) 09/17/2019    AST 14 09/17/2019    ALT 17 09/17/2019    ALKPHOS 189 (H) 09/17/2019    EGFR 22 09/17/2019           Results from last 7 days   Lab Units 09/17/19  0631 09/16/19  0620 09/15/19  1606   POTASSIUM mmol/L 4 0 4 0 4 3   CHLORIDE mmol/L 113* 110* 105   CO2 mmol/L 14* 17* 16*   BUN mg/dL 48* 56* 59*   CREATININE mg/dL 2 62* 3 02* 3 12*   CALCIUM mg/dL 8 3* 8 6 8 8   ALK PHOS U/L 189*  --  185*   ALT U/L 17  --  14   AST U/L 14  --  10*         Phosphorus: No results found for: PHOS  Magnesium: No results found for: MG  Urinalysis: No results found for: COLORU, CLARITYU, SPECGRAV, PHUR, LEUKOCYTESUR, NITRITE, PROTEINUA, GLUCOSEU, KETONESU, BILIRUBINUR, BLOODU  Ionized Calcium: No results found for: CAION  Coagulation: No results found for: PT, INR, APTT  Troponin: No results found for: TROPONINI  ABG: No results found for: PHART, GHP6RDC, PO2ART, ADU1TAK, B7SYALZE, BEART, SOURCE  Radiology review:     IMAGING  Procedure: Ct Abdomen Pelvis Wo Contrast    Result Date: 9/15/2019  Narrative: CT ABDOMEN AND PELVIS WITHOUT IV CONTRAST INDICATION:   Abdominal pain  Generalized weakness COMPARISON:  9/4/2019 and 6/4/2019  TECHNIQUE:  CT examination of the abdomen and pelvis was performed without intravenous contrast   Axial, sagittal, and coronal 2D reformatted images were created from the source data and submitted for interpretation  Radiation dose length product (DLP) for this visit:  407 2 mGy-cm   This examination, like all CT scans performed in the [facility], was performed utilizing techniques to minimize radiation dose exposure, including the use of iterative reconstruction and automated exposure control  Enteric contrast was not administered  FINDINGS: ABDOMEN LOWER CHEST:  No clinically significant abnormality identified in the visualized lower chest  LIVER/BILIARY TREE:  Unremarkable  GALLBLADDER:  Interval percutaneous cholecystostomy with decompression of the gallbladder  SPLEEN:  Unremarkable  PANCREAS:  Unremarkable  ADRENAL GLANDS:  Unremarkable  KIDNEYS/URETERS:  Unremarkable  No hydronephrosis  STOMACH AND BOWEL: Stable coarse calcification adjacent to the gastric fundus, nonspecific  Unremarkable  APPENDIX:  No findings to suggest appendicitis  ABDOMINOPELVIC CAVITY: Stable partially calcified soft tissue mass in the root of the mesentery again measuring 4 9 x 3 6 cm when measured in a similar fashion  A partially calcified soft tissue nodule in the left paracolic gutter is also stable, measuring 1 2 x 1 8 cm (2/77)  Soft tissue stranding is seen surrounding this nodule, which is increased since 9/4/2019, but unchanged compared to 6/4/2019    Prominent mesenteric and retroperitoneal lymph nodes are not substantially changed  For example, a left paraortic node again measures 9 mm (2/45)  No ascites or free intraperitoneal air  VESSELS:  Unremarkable for patient's age  PELVIS REPRODUCTIVE ORGANS:  The prostate gland is again enlarged  URINARY BLADDER:  Collapsed around a Haile catheter  ABDOMINAL WALL/INGUINAL REGIONS:  Unremarkable  OSSEOUS STRUCTURES:  No acute fracture or destructive osseous lesion  Impression: 1  Interval percutaneous cholecystostomy with resulting decompression of the gallbladder and no evidence of an acute abnormality in the abdomen or pelvis  2   Stable partially calcified mesenteric mass and stable partially calcified metastatic mesenteric lesion in the left paracolic gutter/pelvic inlet  Stable retroperitoneal and mesenteric prominent lymph nodes   Workstation performed: [id number]       Current Facility-Administered Medications   Medication Dose Route Frequency    acetaminophen (TYLENOL) tablet 650 mg  650 mg Oral Q6H PRN    amLODIPine (NORVASC) tablet 10 mg  10 mg Oral Daily    aspirin chewable tablet 81 mg  81 mg Oral Daily    b complex-vitamin C-folic acid (NEPHROCAPS) capsule 1 capsule  1 capsule Oral Daily With Dinner    cholecalciferol (VITAMIN D3) tablet 1,000 Units  1,000 Units Oral Daily    colchicine (COLCRYS) tablet 0 6 mg  0 6 mg Oral Daily    heparin (porcine) subcutaneous injection 5,000 Units  5,000 Units Subcutaneous Q8H [facility]    insulin detemir (LEVEMIR) subcutaneous injection 10 Units  10 Units Subcutaneous HS    insulin lispro (HumaLOG) 100 units/mL subcutaneous injection 1-5 Units  1-5 Units Subcutaneous TID AC    insulin lispro (HumaLOG) 100 units/mL subcutaneous injection 1-5 Units  1-5 Units Subcutaneous HS    methenamine hippurate (HIPREX) tablet 1 g  1 g Oral BID    ondansetron (ZOFRAN) injection 4 mg  4 mg Intravenous Q6H PRN    oxyCODONE-acetaminophen (PERCOCET) 5-325 mg per tablet 1 tablet  1 tablet Oral Q6H PRN    pantoprazole (PROTONIX) EC tablet 40 mg  40 mg Oral Early Morning    sodium chloride 0 9 % infusion  125 mL/hr Intravenous Continuous    tamsulosin (FLOMAX) capsule 0 4 mg  0 4 mg Oral Daily With Dinner     Medications Discontinued During This Encounter   Medication Reason    ceftazidime (FORTAZ) 2 25 mg for Intravitreal Injection Only 0 1 mL        Patricia Chua DO

## 2019-09-17 NOTE — PLAN OF CARE
Problem: Potential for Falls  Goal: Patient will remain free of falls  Description  INTERVENTIONS:  - Assess patient frequently for physical needs  -  Identify cognitive and physical deficits and behaviors that affect risk of falls  -  [place] fall precautions as indicated by assessment   - Educate patient/family on patient safety including physical limitations  - Instruct patient to call for assistance with activity based on assessment  - Modify environment to reduce risk of injury  - Consider OT/PT consult to assist with strengthening/mobility  Outcome: Progressing     Problem: Prexisting or High Potential for Compromised Skin Integrity  Goal: Skin integrity is maintained or improved  Description  INTERVENTIONS:  - Identify patients at risk for skin breakdown  - Assess and monitor skin integrity  - Assess and monitor nutrition and hydration status  - Monitor labs   - Assess for incontinence   - Turn and reposition patient  - Assist with mobility/ambulation  - Relieve pressure over bony prominences  - Avoid friction and shearing  - Provide appropriate hygiene as needed including keeping skin clean and dry  - Evaluate need for skin moisturizer/barrier cream  - Collaborate with interdisciplinary team   - Patient/family teaching  - Consider wound care consult   Outcome: Progressing     Problem: Nutrition/Hydration-ADULT  Goal: Nutrient/Hydration intake appropriate for improving, restoring or maintaining nutritional needs  Description  Monitor and assess patient's nutrition/hydration status for malnutrition  Collaborate with interdisciplinary team and initiate plan and interventions as ordered  Monitor patient's weight and dietary intake as ordered or per policy  Utilize nutrition screening tool and intervene as necessary  Determine patient's food preferences and provide high-protein, high-caloric foods as appropriate       INTERVENTIONS:  - Monitor oral intake, urinary output, labs, and treatment plans  - Assess nutrition and hydration status and recommend course of action  - Evaluate amount of meals eaten  - Assist patient with eating if necessary   - Allow adequate time for meals  - Recommend/ encourage appropriate diets, oral nutritional supplements, and vitamin/mineral supplements  - Order, calculate, and assess calorie counts as needed  - Recommend, monitor, and adjust tube feedings and TPN/PPN based on assessed needs  - Assess need for intravenous fluids  - Provide specific nutrition/hydration education as appropriate  - Include patient/family/caregiver in decisions related to nutrition  Outcome: Progressing     Problem: PAIN - ADULT  Goal: Verbalizes/displays adequate comfort level or baseline comfort level  Description  Interventions:  - Encourage patient to monitor pain and request assistance  - Assess pain using appropriate pain scale  - Administer analgesics based on type and severity of pain and evaluate response  - Implement non-pharmacological measures as appropriate and evaluate response  - Consider cultural and social influences on pain and pain management  - Notify physician/advanced practitioner if interventions unsuccessful or patient reports new pain  Outcome: Progressing     Problem: INFECTION - ADULT  Goal: Absence or prevention of progression during hospitalization  Description  INTERVENTIONS:  - Assess and monitor for signs and symptoms of infection  - Monitor lab/diagnostic results  - Monitor all insertion sites, i e  indwelling lines, tubes, and drains  - Monitor endotracheal if appropriate and nasal secretions for changes in amount and color  - [place] appropriate cooling/warming therapies per order  - Administer medications as ordered  - Instruct and encourage patient and family to use good hand hygiene technique  - Identify and instruct in appropriate isolation precautions for identified infection/condition  Outcome: Progressing  Goal: Absence of fever/infection during neutropenic period  Description  INTERVENTIONS:  - Monitor WBC    Outcome: Progressing     Problem: SAFETY ADULT  Goal: Patient will remain free of falls  Description  INTERVENTIONS:  - Assess patient frequently for physical needs  -  Identify cognitive and physical deficits and behaviors that affect risk of falls    -  [place] fall precautions as indicated by assessment   - Educate patient/family on patient safety including physical limitations  - Instruct patient to call for assistance with activity based on assessment  - Modify environment to reduce risk of injury  - Consider OT/PT consult to assist with strengthening/mobility  Outcome: Progressing  Goal: Maintain or return to baseline ADL function  Description  INTERVENTIONS:  -  Assess patient's ability to carry out ADLs; assess patient's baseline for ADL function and identify physical deficits which impact ability to perform ADLs (bathing, care of mouth/teeth, toileting, grooming, dressing, etc )  - Assess/evaluate cause of self-care deficits   - Assess range of motion  - Assess patient's mobility; develop plan if impaired  - Assess patient's need for assistive devices and provide as appropriate  - Encourage maximum independence but intervene and supervise when necessary  - Involve family in performance of ADLs  - Assess for home care needs following discharge   - Consider OT consult to assist with ADL evaluation and planning for discharge  - Provide patient education as appropriate  Outcome: Progressing  Goal: Maintain or return mobility status to optimal level  Description  INTERVENTIONS:  - Assess patient's baseline mobility status (ambulation, transfers, stairs, etc )    - Identify cognitive and physical deficits and behaviors that affect mobility  - Identify mobility aids required to assist with transfers and/or ambulation (gait belt, sit-to-stand, lift, walker, cane, etc )  - [place] fall precautions as indicated by assessment  - Record patient progress and toleration of activity level on Mobility SBAR; progress patient to next Phase/Stage  - Instruct patient to call for assistance with activity based on assessment  - Consider rehabilitation consult to assist with strengthening/weightbearing, etc   Outcome: Progressing     Problem: DISCHARGE PLANNING  Goal: Discharge to home or other facility with appropriate resources  Description  INTERVENTIONS:  - Identify barriers to discharge w/patient and caregiver  - Arrange for needed discharge resources and transportation as appropriate  - Identify discharge learning needs (meds, wound care, etc )  - Arrange for interpretive services to assist at discharge as needed  - Refer to Case Management Department for coordinating discharge planning if the patient needs post-hospital services based on physician/advanced practitioner order or complex needs related to functional status, cognitive ability, or social support system  Outcome: Progressing     Problem: Knowledge Deficit  Goal: Patient/family/caregiver demonstrates understanding of disease process, treatment plan, medications, and discharge instructions  Description  Complete learning assessment and assess knowledge base    Interventions:  - Provide teaching at level of understanding  - Provide teaching via preferred learning methods  Outcome: Progressing

## 2019-09-18 VITALS
WEIGHT: 153 LBS | SYSTOLIC BLOOD PRESSURE: 142 MMHG | DIASTOLIC BLOOD PRESSURE: 62 MMHG | HEART RATE: 79 BPM | TEMPERATURE: 97.9 F | OXYGEN SATURATION: 98 % | BODY MASS INDEX: 23.19 KG/M2 | HEIGHT: 68 IN | RESPIRATION RATE: 18 BRPM

## 2019-09-18 LAB
ANION GAP SERPL CALCULATED.3IONS-SCNC: 12 MMOL/L (ref 4–13)
BACTERIA UR CULT: ABNORMAL
BASOPHILS # BLD AUTO: 0.1 THOUSANDS/ΜL (ref 0–0.1)
BASOPHILS NFR BLD AUTO: 1 % (ref 0–2)
BUN SERPL-MCNC: 48 MG/DL (ref 7–25)
CALCIUM SERPL-MCNC: 8.4 MG/DL (ref 8.6–10.5)
CHLORIDE SERPL-SCNC: 110 MMOL/L (ref 98–107)
CO2 SERPL-SCNC: 15 MMOL/L (ref 21–31)
CREAT SERPL-MCNC: 2.62 MG/DL (ref 0.7–1.3)
EOSINOPHIL # BLD AUTO: 0.1 THOUSAND/ΜL (ref 0–0.61)
EOSINOPHIL NFR BLD AUTO: 1 % (ref 0–5)
ERYTHROCYTE [DISTWIDTH] IN BLOOD BY AUTOMATED COUNT: 14.6 % (ref 11.5–14.5)
GFR SERPL CREATININE-BSD FRML MDRD: 22 ML/MIN/1.73SQ M
GLUCOSE SERPL-MCNC: 116 MG/DL (ref 65–140)
GLUCOSE SERPL-MCNC: 131 MG/DL (ref 65–99)
GLUCOSE SERPL-MCNC: 227 MG/DL (ref 65–140)
HCT VFR BLD AUTO: 28.7 % (ref 42–47)
HGB BLD-MCNC: 9.7 G/DL (ref 14–18)
LYMPHOCYTES # BLD AUTO: 1.5 THOUSANDS/ΜL (ref 0.6–4.47)
LYMPHOCYTES NFR BLD AUTO: 11 % (ref 21–51)
MCH RBC QN AUTO: 29.3 PG (ref 26–34)
MCHC RBC AUTO-ENTMCNC: 33.9 G/DL (ref 31–37)
MCV RBC AUTO: 87 FL (ref 81–99)
MONOCYTES # BLD AUTO: 1 THOUSAND/ΜL (ref 0.17–1.22)
MONOCYTES NFR BLD AUTO: 8 % (ref 2–12)
NEUTROPHILS # BLD AUTO: 10.1 THOUSANDS/ΜL (ref 1.4–6.5)
NEUTS SEG NFR BLD AUTO: 79 % (ref 42–75)
PLATELET # BLD AUTO: 238 THOUSANDS/UL (ref 149–390)
PMV BLD AUTO: 8 FL (ref 8.6–11.7)
POTASSIUM SERPL-SCNC: 4 MMOL/L (ref 3.5–5.5)
RBC # BLD AUTO: 3.31 MILLION/UL (ref 4.3–5.9)
SODIUM SERPL-SCNC: 137 MMOL/L (ref 134–143)
WBC # BLD AUTO: 12.8 THOUSAND/UL (ref 4.8–10.8)

## 2019-09-18 PROCEDURE — 80048 BASIC METABOLIC PNL TOTAL CA: CPT | Performed by: INTERNAL MEDICINE

## 2019-09-18 PROCEDURE — 99232 SBSQ HOSP IP/OBS MODERATE 35: CPT | Performed by: INTERNAL MEDICINE

## 2019-09-18 PROCEDURE — 85025 COMPLETE CBC W/AUTO DIFF WBC: CPT | Performed by: INTERNAL MEDICINE

## 2019-09-18 PROCEDURE — 97110 THERAPEUTIC EXERCISES: CPT

## 2019-09-18 PROCEDURE — 99239 HOSP IP/OBS DSCHRG MGMT >30: CPT | Performed by: INTERNAL MEDICINE

## 2019-09-18 PROCEDURE — 82948 REAGENT STRIP/BLOOD GLUCOSE: CPT

## 2019-09-18 RX ORDER — SODIUM BICARBONATE 650 MG/1
650 TABLET ORAL
Refills: 0
Start: 2019-09-18 | End: 2020-02-20 | Stop reason: HOSPADM

## 2019-09-18 RX ADMIN — SODIUM BICARBONATE 650 MG: 650 TABLET ORAL at 09:14

## 2019-09-18 RX ADMIN — ASPIRIN 81 MG 81 MG: 81 TABLET ORAL at 09:13

## 2019-09-18 RX ADMIN — INSULIN LISPRO 2 UNITS: 100 INJECTION, SOLUTION INTRAVENOUS; SUBCUTANEOUS at 11:29

## 2019-09-18 RX ADMIN — VITAMIN D, TAB 1000IU (100/BT) 1000 UNITS: 25 TAB at 09:13

## 2019-09-18 RX ADMIN — OXYCODONE HYDROCHLORIDE AND ACETAMINOPHEN 1 TABLET: 5; 325 TABLET ORAL at 05:50

## 2019-09-18 RX ADMIN — PANTOPRAZOLE SODIUM 40 MG: 40 TABLET, DELAYED RELEASE ORAL at 05:48

## 2019-09-18 RX ADMIN — COLCHICINE 0.6 MG: 0.6 TABLET, FILM COATED ORAL at 09:14

## 2019-09-18 RX ADMIN — HEPARIN SODIUM 5000 UNITS: 5000 INJECTION INTRAVENOUS; SUBCUTANEOUS at 05:48

## 2019-09-18 RX ADMIN — AMLODIPINE BESYLATE 10 MG: 5 TABLET ORAL at 09:13

## 2019-09-18 RX ADMIN — METHENAMINE HIPPURATE 1 G: 1 TABLET ORAL at 09:14

## 2019-09-18 NOTE — SOCIAL WORK
Auth# for the transport from [place] [id number] as per [place], pt and SO were made aware [facility] was obtained and they are responsible for any copays

## 2019-09-18 NOTE — PROGRESS NOTES
09/17/19 2200   Charting Type   Charting Type Shift assessment   Neurological   Neuro (WDL) WDL   HEENT   Teeth Missing teeth   Respiratory   Bilateral Breath Sounds Diminished;Clear   Cardiac   Cardiac Regularity Irregular   Peripheral Vascular   Peripheral Vascular (WDL) WDL   Integumentary   Skin Color Pale   Librado Scale   Sensory Perceptions 3   Moisture 3   Activity 2   Mobility 3   Nutrition 3   Friction and Shear 2   Librado Scale Score 16   Wound 09/15/19 Pressure Injury Sacrum   Date First Assessed/Time First Assessed: 09/15/19 2036   Pre-Existing Wound: Yes  Primary Wound Type: Pressure Injury  Location: Sacrum  Wound Description (Comments): Unstageable   Wound Description Non-blanchable erythema   Treatments Site care   Dressing Open to air;Moisture barrier   Wound 09/04/19 Pressure Injury Buttocks Right   Date First Assessed/Time First Assessed: 09/04/19 1026   Pre-Existing Wound: Yes  Primary Wound Type: Pressure Injury  Location: Buttocks  Wound Location Orientation: Right  Wound Description (Comments): right buttock stage 1   Wound Description Non-blanchable erythema   Dressing Open to air;Moisture barrier   Wound 09/04/19 Pressure Injury Buttocks Left   Date First Assessed/Time First Assessed: 09/04/19 1026   Primary Wound Type: Pressure Injury  Location: Buttocks  Wound Location Orientation: Left  Wound Description (Comments): left buttock stage 1   Wound Description Non-blanchable erythema   Dressing Moisture barrier;Open to air   Musculoskeletal   Level of Assistance Maximum assist, patient does 25-49%   RUE Limited movement   LUE Limited movement   RLE Limited movement   LLE Limited movement   Gastrointestinal   Gastrointestinal (WDL) WDL   Psychosocial   Psychosocial (WDL) WDL

## 2019-09-18 NOTE — SOCIAL WORK
I received a call from [facility] at [place], they were unable to get transport , I was told to call [facility], I called at [phone] and they were unable to help, I was told to call [facility] and they are not able, I called [facility] back at [place] and she stated I can go with anyone out of the network since the in network ambulances are not available, I did try [place] transport and they were unable, I tried [facility] and they were not able I called [facility] back at 09:50 for more options and they staed they had a cx and they could helps at 14:15, I made the pt and SO aware, I contacted barbara hernandez was given the fax# for d/c instructions and # for report, pt and family in agreement with the d/c and d/c plan for some rehab, d/c plan was discussed at care coordination rounds

## 2019-09-18 NOTE — PROGRESS NOTES
Progress Note - Nephrology   Nba Elias [de-identified] y o  male MRN: [id number]  Unit/Bed#: -01 Encounter: [id number]    A/P:  1  Acute renal failure due to acute tubular necrosis             Creatinine stable over last 48 hours  Continue courage supportive care, monitor kidney function from time to time  2  Chronic kidney disease stage 3 with baseline creatinine likely around 1 4 - 1 5 mg/dL  3  Probable diabetic nephropathy  4  Proteinuria               continue supportive care, no renally directed acute Care indicated at this time  5  Acidosis                Patient had sodium bicarbonate, 1300 mg twice a day begun, bicarbonate slightly improved today, continue monitor for now no additional bicarbonate dose adjustments  Check blood work every 2-3 days if needed  Once a week until patient has achieved appropriate serum bicarbonate levels  6  Neuroendocrine tumor               Continue follow-up with Dr Zeferino Dickens is continue with Lanreotide injections every 4 weeks for now   =============  7  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              Continue Haile catheter    8  Right hydroureter nephrosis              Resolved      Follow up reason for today's visit:  Acute kidney injury/chronic kidney disease    Acute kidney injury superimposed on chronic kidney disease ([facility] [address] )    Patient Active Problem List   Diagnosis    Weakness generalized    Type 2 diabetes mellitus with stage 3 chronic kidney disease, with long-term current use of insulin ([address] )    Essential hypertension    Mixed hyperlipidemia    Cardiac disease    Generalized abdominal mass    Hydroureter    Metabolic acidosis    Iron deficiency anemia secondary to inadequate dietary iron intake    Accelerated hypertension    Liver mass    Neuroendocrine tumor    Acute cystitis without hematuria    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)    Acute kidney injury superimposed on chronic kidney disease ([address] )    Pressure injury of right heel, unstageable ([address] )    Atherosclerosis of artery of extremity with ulceration ([address] )    Carcinoid syndrome ([address] )    Acute cystitis with hematuria    Chronic indwelling Garcia catheter    Moderate protein-calorie malnutrition (HCC)    Biliary sludge    Urinary tract infection due to extended-spectrum beta lactamase (ESBL) producing Escherichia coli    Malignant neuroendocrine neoplasm (HCC)    Generalized abdominal pain    Urinary retention         Subjective:   No Acute events overnight, patient is eating and drinking well  Upon entering the patient's room on his breakfast was complete  Objective:     Vitals: Blood pressure 142/62, pulse 79, temperature 97 9 °F (36 6 °C), temperature source Temporal, resp  rate 18, height 5' 8" (1 727 m), weight 69 4 kg (153 lb), SpO2 98 %  ,Body mass index is 23 26 kg/m²  Weight (last 2 days)     None            Intake/Output Summary (Last 24 hours) at 9/18/2019 0932  Last data filed at 9/18/2019 0914  Gross per 24 hour   Intake 240 ml   Output 2790 ml   Net -2550 ml     I/O last 3 completed shifts: In: 18 [P O :480; I V :1000]  Out: 4935 [Urine:4600; Drains:335]    Urethral Catheter Latex 16 Fr   (Active)   Reasons to continue Urinary Catheter  Chronic urinary catheter 9/18/2019  5:01 AM   Goal for Removal N/A- chronic garcia 9/18/2019  5:01 AM   Site Assessment Clean;Skin intact 9/18/2019  5:01 AM   Collection Container Standard drainage bag 9/18/2019  5:01 AM   Securement Method Securing device (Describe) 9/18/2019  5:01 AM   Output (mL) 500 mL 9/18/2019  5:01 AM       Physical Exam: /62   Pulse 79   Temp 97 9 °F (36 6 °C) (Temporal)   Resp 18   Ht 5' 8" (1 727 m)   Wt 69 4 kg (153 lb)   SpO2 98%   BMI 23 26 kg/m²     General Appearance:    Alert, cooperative, no distress, appears stated age   Head:    Normocephalic, without obvious abnormality, atraumatic   Eyes:    Conjunctiva/corneas clear   Ears:    Normal external ears   Nose:   Nares normal, septum midline, mucosa normal, no drainage    or sinus tenderness   Throat:   Lips, mucosa, and tongue normal; teeth and gums normal   Neck:   Supple   Back:     Symmetric, no curvature, ROM normal, no CVA tenderness   Lungs:     Clear to auscultation bilaterally, respirations unlabored   Chest wall:    No tenderness or deformity   Heart:    Regular rate and rhythm, S1 and S2 normal, no murmur, rub   or gallop   Abdomen:     Soft, non-tender, bowel sounds active   Extremities:   Extremities normal, atraumatic, no cyanosis or edema   Skin:   Skin color, texture, turgor normal, no rashes or lesions   Lymph nodes:   Cervical normal   Neurologic:   CNII-XII intact            Lab, Imaging and other studies: I have personally reviewed pertinent labs  CBC:   Lab Results   Component Value Date    WBC 12 80 (H) 09/18/2019    HGB 9 7 (L) 09/18/2019    HCT 28 7 (L) 09/18/2019    MCV 87 09/18/2019     09/18/2019    MCH 29 3 09/18/2019    MCHC 33 9 09/18/2019    RDW 14 6 (H) 09/18/2019    MPV 8 0 (L) 09/18/2019     CMP:   Lab Results   Component Value Date    K 4 0 09/18/2019     (H) 09/18/2019    CO2 15 (L) 09/18/2019    BUN 48 (H) 09/18/2019    CREATININE 2 62 (H) 09/18/2019    CALCIUM 8 4 (L) 09/18/2019    EGFR 22 09/18/2019           Results from last 7 days   Lab Units 09/18/19  0454 09/17/19  0631 09/16/19  0620 09/15/19  1606   POTASSIUM mmol/L 4 0 4 0 4 0 4 3   CHLORIDE mmol/L 110* 113* 110* 105   CO2 mmol/L 15* 14* 17* 16*   BUN mg/dL 48* 48* 56* 59*   CREATININE mg/dL 2 62* 2 62* 3 02* 3 12*   CALCIUM mg/dL 8 4* 8 3* 8 6 8 8   ALK PHOS U/L  --  189*  --  185*   ALT U/L  --  17  --  14   AST U/L  --  14  --  10*         Phosphorus: No results found for: PHOS  Magnesium: No results found for: MG  Urinalysis: No results found for: COLORU, CLARITYU, SPECGRAV, PHUR, LEUKOCYTESUR, NITRITE, PROTEINUA, GLUCOSEU, KETONESU, BILIRUBINUR, BLOODU  Ionized Calcium: No results found for: CAION  Coagulation: No results found for: PT, INR, APTT  Troponin: No results found for: TROPONINI  ABG: No results found for: PHART, SOD8CFL, PO2ART, ENN6SGJ, E2LRDMNF, BEART, SOURCE  Radiology review:     IMAGING  Procedure: Ct Abdomen Pelvis Wo Contrast    Result Date: 9/15/2019  Narrative: CT ABDOMEN AND PELVIS WITHOUT IV CONTRAST INDICATION:   Abdominal pain  Generalized weakness COMPARISON:  9/4/2019 and 6/4/2019  TECHNIQUE:  CT examination of the abdomen and pelvis was performed without intravenous contrast   Axial, sagittal, and coronal 2D reformatted images were created from the source data and submitted for interpretation  Radiation dose length product (DLP) for this visit:  407 2 mGy-cm   This examination, like all CT scans performed in the [facility], was performed utilizing techniques to minimize radiation dose exposure, including the use of iterative reconstruction and automated exposure control  Enteric contrast was not administered  FINDINGS: ABDOMEN LOWER CHEST:  No clinically significant abnormality identified in the visualized lower chest  LIVER/BILIARY TREE:  Unremarkable  GALLBLADDER:  Interval percutaneous cholecystostomy with decompression of the gallbladder  SPLEEN:  Unremarkable  PANCREAS:  Unremarkable  ADRENAL GLANDS:  Unremarkable  KIDNEYS/URETERS:  Unremarkable  No hydronephrosis  STOMACH AND BOWEL: Stable coarse calcification adjacent to the gastric fundus, nonspecific  Unremarkable  APPENDIX:  No findings to suggest appendicitis  ABDOMINOPELVIC CAVITY: Stable partially calcified soft tissue mass in the root of the mesentery again measuring 4 9 x 3 6 cm when measured in a similar fashion  A partially calcified soft tissue nodule in the left paracolic gutter is also stable, measuring 1 2 x 1 8 cm (2/77)  Soft tissue stranding is seen surrounding this nodule, which is increased since 9/4/2019, but unchanged compared to 6/4/2019    Prominent mesenteric and retroperitoneal lymph nodes are not substantially changed  For example, a left paraortic node again measures 9 mm (2/45)  No ascites or free intraperitoneal air  VESSELS:  Unremarkable for patient's age  PELVIS REPRODUCTIVE ORGANS:  The prostate gland is again enlarged  URINARY BLADDER:  Collapsed around a Haile catheter  ABDOMINAL WALL/INGUINAL REGIONS:  Unremarkable  OSSEOUS STRUCTURES:  No acute fracture or destructive osseous lesion  Impression: 1  Interval percutaneous cholecystostomy with resulting decompression of the gallbladder and no evidence of an acute abnormality in the abdomen or pelvis  2   Stable partially calcified mesenteric mass and stable partially calcified metastatic mesenteric lesion in the left paracolic gutter/pelvic inlet  Stable retroperitoneal and mesenteric prominent lymph nodes   Workstation performed: [id number]       Current Facility-Administered Medications   Medication Dose Route Frequency    acetaminophen (TYLENOL) tablet 650 mg  650 mg Oral Q6H PRN    amLODIPine (NORVASC) tablet 10 mg  10 mg Oral Daily    aspirin chewable tablet 81 mg  81 mg Oral Daily    b complex-vitamin C-folic acid (NEPHROCAPS) capsule 1 capsule  1 capsule Oral Daily With Dinner    cholecalciferol (VITAMIN D3) tablet 1,000 Units  1,000 Units Oral Daily    colchicine (COLCRYS) tablet 0 6 mg  0 6 mg Oral Daily    heparin (porcine) subcutaneous injection 5,000 Units  5,000 Units Subcutaneous Q8H [facility]    insulin detemir (LEVEMIR) subcutaneous injection 10 Units  10 Units Subcutaneous HS    insulin lispro (HumaLOG) 100 units/mL subcutaneous injection 1-5 Units  1-5 Units Subcutaneous TID AC    insulin lispro (HumaLOG) 100 units/mL subcutaneous injection 1-5 Units  1-5 Units Subcutaneous HS    methenamine hippurate (HIPREX) tablet 1 g  1 g Oral BID    ondansetron (ZOFRAN) injection 4 mg  4 mg Intravenous Q6H PRN    oxyCODONE-acetaminophen (PERCOCET) 5-325 mg per tablet 1 tablet  1 tablet Oral Q6H PRN    pantoprazole (PROTONIX) EC tablet 40 mg  40 mg Oral Early Morning    sodium bicarbonate tablet 650 mg  650 mg Oral BID after meals    tamsulosin (FLOMAX) capsule 0 4 mg  0 4 mg Oral Daily With Dinner     Medications Discontinued During This Encounter   Medication Reason    ceftazidime (FORTAZ) 2 25 mg for Intravitreal Injection Only 0 1 mL     sodium chloride 0 9 % infusion        Silvino Arellano DO

## 2019-09-18 NOTE — PLAN OF CARE
Problem: PHYSICAL THERAPY ADULT  Goal: Performs mobility at highest level of function for planned discharge setting  See evaluation for individualized goals  Description  Treatment/Interventions: Functional transfer training, LE strengthening/ROM, Elevations, Therapeutic exercise, Endurance training, Patient/family training, Equipment eval/education, Bed mobility, Gait training, Spoke to nursing, Spoke to case management, OT  Equipment Recommended: Walker(continue RW use)       See flowsheet documentation for full assessment, interventions and recommendations  9/18/2019 1135 by Dionicio Zheng PTA  Outcome: Not Progressing  Note:   Prognosis: Fair  Problem List: Decreased strength, Decreased endurance, Impaired balance, Decreased mobility, Decreased safety awareness  Assessment: Pt seen for PT treatment session this date with interventions consisting of Therapeutic exercise consisting of: AROM 20 reps B LE in supine position  Pt agreeable to PT treatment session upon arrival, pt found supine in bed w/ HOB elevated, in no apparent distress  In comparison to previous session, pt with no improvements as evidenced by decreased activity tolerance  Post session: all needs in reach in bed with SCDs active, SO present  Continue to recommend STR at time of d/c in order to maximize pt's functional independence and safety w/ mobility  Pt continues to be functioning below baseline level, and remains limited 2* factors listed above and including decreased strength, endurance & safe functional mobility  PT will continue to see pt while here in order to address the deficits listed above and provide interventions consistent w/ POC in effort to achieve STGs  Barriers to Discharge: Inaccessible home environment, Decreased caregiver support     Recommendation: Short-term skilled PT     PT - OK to Discharge: Yes(when med cleared to STR)    See flowsheet documentation for full assessment       9/18/2019 1135 by Dionicio Zheng PTA  Reactivated

## 2019-09-18 NOTE — ASSESSMENT & PLAN NOTE
· Patient with right upper quadrant showing sludge, HIDA scan indicative of cholecystitis  · He had percutaneous cholecystostomy on 09/06/2019 and will need a full cholecystectomy in the future  · Surgery input appreciated  · No further intervention needed at this time  · Follow up with surgery as outpatient

## 2019-09-18 NOTE — RESULT ENCOUNTER NOTE
Contacted hospitalist to discharge patient earlier today, as well as Dr Mela Horowitz patient's PCP and nephrologist who saw him on consult inpatient, both were aware of patient's chronic indwelling Haile and likely colonization with ESBL E coli  Patient was afebrile and plan will be to consider this colonization at current time with no active treatment  They will follow outpatient with PCP 
no

## 2019-09-18 NOTE — ASSESSMENT & PLAN NOTE
· Monthly chemo injection  · Follow up outpatient with Oncology, Dr Denis Clayton  · Patient missed chemo session this month due to being in the hospital

## 2019-09-18 NOTE — PHYSICAL THERAPY NOTE
09/18/19 0827   Pain Assessment   Pain Assessment 0-10   Pain Score No Pain   Restrictions/Precautions   Weight Bearing Precautions Per Order No   Other Precautions Contact/isolation;Multiple lines; Fall Risk   General   Chart Reviewed Yes   Family/Caregiver Present Yes  (SO)   Cognition   Overall Cognitive Status WFL   Arousal/Participation Alert; Cooperative   Attention Within functional limits   Orientation Level Oriented X4   Memory Decreased recall of recent events;Decreased recall of precautions   Following Commands Follows one step commands without difficulty   Comments pt agreed to PT session-ex in bed   Subjective   Subjective "I'll do some ex in the bed  I'm reall tired "   Bed Mobility   Additional Comments pt deferred transfers   Endurance Deficit   Endurance Deficit Yes   Activity Tolerance   Activity Tolerance Patient limited by fatigue   Exercises   Quad Sets Supine;20 reps;AROM; Bilateral   Glute Sets Supine;20 reps;AROM; Bilateral   Hip Abduction Supine;20 reps;AROM; Bilateral   Assessment   Prognosis Fair   Problem List Decreased strength;Decreased endurance; Impaired balance;Decreased mobility; Decreased safety awareness   Assessment Pt seen for PT treatment session this date with interventions consisting of Therapeutic exercise consisting of: AROM 20 reps B LE in supine position  Pt agreeable to PT treatment session upon arrival, pt found supine in bed w/ HOB elevated, in no apparent distress  In comparison to previous session, pt with no improvements as evidenced by decreased activity tolerance  Post session: all needs in reach in bed with SCDs active, SO present  Continue to recommend STR at time of d/c in order to maximize pt's functional independence and safety w/ mobility  Pt continues to be functioning below baseline level, and remains limited 2* factors listed above and including decreased strength, endurance & safe functional mobility   PT will continue to see pt while here in order to address the deficits listed above and provide interventions consistent w/ POC in effort to achieve STGs  Barriers to Discharge Inaccessible home environment;Decreased caregiver support   Goals   Patient Goals to take a nap   Treatment Day 1   Plan   Treatment/Interventions Functional transfer training;LE strengthening/ROM; Therapeutic exercise; Endurance training;Patient/family training;Equipment eval/education; Bed mobility;Gait training   Progress No functional improvements   PT Frequency   (3-5 x week)   Recommendation   Recommendation Short-term skilled PT   Equipment Recommended Walker   PT - OK to Discharge Yes  (when med cleared to STR)   Mary Omalley, PTA

## 2019-09-18 NOTE — SOCIAL WORK
Pt has been accepted to [facility], I spoke with Chantelle Drummond and they can take the pt today, sent to [place] for transport, doctor was made aware [facility] has been obtained

## 2019-09-18 NOTE — DISCHARGE SUMMARY
Discharge- [place] 1940, [de-identified] y o  male MRN: [id number]    Unit/Bed#: -01 Encounter: [id number]    Primary Care Provider: Luis Eduardo Barry DO   Date and time admitted to hospital: 9/15/2019  3:37 PM        * Acute kidney injury superimposed on chronic kidney disease ([address] )  Assessment & Plan  · Chronic kidney disease stage 3  · Baseline creatinine appears to be around 1 5  · Nephrology input appreciated  · Creatinine improving  · Patient will follow up with Nephrology as outpatient    Biliary sludge  Assessment & Plan  · Patient with right upper quadrant showing sludge, HIDA scan indicative of cholecystitis  · He had percutaneous cholecystostomy on 09/06/2019 and will need a full cholecystectomy in the future  · Surgery input appreciated  · No further intervention needed at this time  · Follow up with surgery as outpatient    Neuroendocrine tumor  Assessment & Plan  · Monthly chemo injection  · Follow up outpatient with Oncology, Dr Ramírez Moise  · Patient missed chemo session this month due to being in the hospital    Weakness generalized  Assessment & Plan  · Likely multifactorial, given patient's recent cholecystostomy tube, acute renal failure, and neuroendocrine tumor  · Will continue PT/OT  · Supportive care for the above-mentioned medical problems    Moderate protein-calorie malnutrition ([address] )  Assessment & Plan  Malnutrition Findings:        secondary to chronic disease    BMI Findings: Body mass index is 23 26 kg/m²     Consult dietitian    Chronic indwelling Haile catheter  Assessment & Plan  · Continue chronic Haile catheter    Type 2 diabetes mellitus with stage 3 chronic kidney disease, with long-term current use of insulin ([address] )  Assessment & Plan  · Continue home diabetic regimen    Discharging Physician / Practitioner: Jyoti Will MD  PCP: Luis Eduardo Barry DO  Admission Date:   Admission Orders (From admission, onward)     Ordered        09/15/19 1711  Inpatient Admission (expected length of stay for this patient Order details is greater than two midnights)  Once                   Discharge Date: 09/18/19    Resolved Problems  Date Reviewed: 9/15/2019    None          Consultations During Hospital Stay:  · Nephrology, surgery    Procedures Performed:   · None    Significant Findings / Test Results:   · Acute on chronic kidney injury    Incidental Findings:   · None     Test Results Pending at Discharge (will require follow up): · None     Outpatient Tests Requested:  · Repeat BMP next week with results sent to Dr Christiano Sosa    Complications:     None    Reason for Admission:  Acute kidney injury    Hospital Course:     Dayanara Redman is a [de-identified] y o  male patient who originally presented to the hospital on 9/15/2019 due to generalized weakness  Patient was found have acute on chronic kidney injury and suspected infection  She was initially started on antibiotics however due to patient being afebrile with no leukocytosis and normal procalcitonin antibiotics were discontinued  Patient was evaluated by surgery given his recent history of cholecystostomy tube  Given patient's frailty and nonacute presentation surgery recommended continuing cholecystostomy tube and following up as outpatient for further evaluation to decide when appropriate to perform cholecystectomy  Patient was also seen by Nephrology due to acute on chronic kidney injury  Patient was on gentle hydration and creatinine was improving  Patient was also noted to have metabolic acidosis likely secondary to patient's acute renal failure and started on bicarb per Nephrology  Bicarb level was improving  Case management arranged for patient to be transferred to SNF  Family in agreement with plan  Please see above list of diagnoses and related plan for additional information       Condition at Discharge: poor     Discharge Day Visit / Exam:     Subjective:  No complaints today    Vitals: Blood Pressure: 142/62 (09/18/19 0913)  Pulse: 79 (09/18/19 0748)  Temperature: 97 9 °F (36 6 °C) (09/18/19 0748)  Temp Source: Temporal (09/18/19 0748)  Respirations: 18 (09/18/19 0748)  Height: 5' 8" (172 7 cm) (09/15/19 1541)  Weight - Scale: 69 4 kg (153 lb) (09/15/19 1541)  SpO2: 98 % (09/18/19 0748)     Exam:   Physical Exam   Constitutional: No distress  Frail elderly male   HENT:   Head: Normocephalic and atraumatic  Eyes: Conjunctivae and EOM are normal    Neck: Normal range of motion  Neck supple  Cardiovascular: Normal rate and regular rhythm  Pulmonary/Chest: Effort normal  No respiratory distress  Abdominal: Soft  He exhibits no distension  There is no tenderness  Cholecystostomy tube in place   Genitourinary:   Genitourinary Comments: Haile catheter in place   Musculoskeletal:   Generalized weakness   Neurological: He is alert  No cranial nerve deficit  Skin: Skin is warm and dry  Psychiatric: He exhibits a depressed mood  Discussion with Family:  Discussed discharge plan with girlfriend at bedside    Discharge instructions/Information to patient and family:   See after visit summary for information provided to patient and family  Provisions for Follow-Up Care:  See after visit summary for information related to follow-up care and any pertinent home health orders  Disposition:     Other [facility] at [address] [place] [address] to [facility] SNF:   · Not Applicable to this Patient - Not Applicable to this Patient    Planned Readmission:    no     Discharge Statement:  I spent 35 minutes discharging the patient  This time was spent on the day of discharge  I had direct contact with the patient on the day of discharge  Greater than 50% of the total time was spent examining patient, answering all patient questions, arranging and discussing plan of care with patient as well as directly providing post-discharge instructions    Additional time then spent on discharge activities  Discharge Medications:  See after visit summary for reconciled discharge medications provided to patient and family        ** Please Note: This note has been constructed using a voice recognition system **

## 2019-09-18 NOTE — ASSESSMENT & PLAN NOTE
· Chronic kidney disease stage 3  · Baseline creatinine appears to be around 1 5  · Nephrology input appreciated  · Creatinine improving  · Patient will follow up with Nephrology as outpatient

## 2019-09-18 NOTE — NURSING NOTE
[facility]  Pt denies pain, denies shortness of breath  Wife at bedside  Discharge instructions and care plan discussed with pt, all questions answered  Report called to [place] to [employer]  IV removed without complications and tip intact  All belongings with pt   Pt escorted to front entrance and assisted into ambulance by transport team

## 2019-09-18 NOTE — PLAN OF CARE
Problem: Potential for Falls  Goal: Patient will remain free of falls  Description  INTERVENTIONS:  - Assess patient frequently for physical needs  -  Identify cognitive and physical deficits and behaviors that affect risk of falls  -  [place] fall precautions as indicated by assessment   - Educate patient/family on patient safety including physical limitations  - Instruct patient to call for assistance with activity based on assessment  - Modify environment to reduce risk of injury  - Consider OT/PT consult to assist with strengthening/mobility  Outcome: Adequate for Discharge     Problem: Prexisting or High Potential for Compromised Skin Integrity  Goal: Skin integrity is maintained or improved  Description  INTERVENTIONS:  - Identify patients at risk for skin breakdown  - Assess and monitor skin integrity  - Assess and monitor nutrition and hydration status  - Monitor labs   - Assess for incontinence   - Turn and reposition patient  - Assist with mobility/ambulation  - Relieve pressure over bony prominences  - Avoid friction and shearing  - Provide appropriate hygiene as needed including keeping skin clean and dry  - Evaluate need for skin moisturizer/barrier cream  - Collaborate with interdisciplinary team   - Patient/family teaching  - Consider wound care consult   Outcome: Adequate for Discharge     Problem: Nutrition/Hydration-ADULT  Goal: Nutrient/Hydration intake appropriate for improving, restoring or maintaining nutritional needs  Description  Monitor and assess patient's nutrition/hydration status for malnutrition  Collaborate with interdisciplinary team and initiate plan and interventions as ordered  Monitor patient's weight and dietary intake as ordered or per policy  Utilize nutrition screening tool and intervene as necessary  Determine patient's food preferences and provide high-protein, high-caloric foods as appropriate       INTERVENTIONS:  - Monitor oral intake, urinary output, labs, and treatment plans  - Assess nutrition and hydration status and recommend course of action  - Evaluate amount of meals eaten  - Assist patient with eating if necessary   - Allow adequate time for meals  - Recommend/ encourage appropriate diets, oral nutritional supplements, and vitamin/mineral supplements  - Order, calculate, and assess calorie counts as needed  - Recommend, monitor, and adjust tube feedings and TPN/PPN based on assessed needs  - Assess need for intravenous fluids  - Provide specific nutrition/hydration education as appropriate  - Include patient/family/caregiver in decisions related to nutrition  Outcome: Adequate for Discharge     Problem: PAIN - ADULT  Goal: Verbalizes/displays adequate comfort level or baseline comfort level  Description  Interventions:  - Encourage patient to monitor pain and request assistance  - Assess pain using appropriate pain scale  - Administer analgesics based on type and severity of pain and evaluate response  - Implement non-pharmacological measures as appropriate and evaluate response  - Consider cultural and social influences on pain and pain management  - Notify physician/advanced practitioner if interventions unsuccessful or patient reports new pain  Outcome: Adequate for Discharge     Problem: INFECTION - ADULT  Goal: Absence or prevention of progression during hospitalization  Description  INTERVENTIONS:  - Assess and monitor for signs and symptoms of infection  - Monitor lab/diagnostic results  - Monitor all insertion sites, i e  indwelling lines, tubes, and drains  - Monitor endotracheal if appropriate and nasal secretions for changes in amount and color  - [place] appropriate cooling/warming therapies per order  - Administer medications as ordered  - Instruct and encourage patient and family to use good hand hygiene technique  - Identify and instruct in appropriate isolation precautions for identified infection/condition  Outcome: Adequate for Discharge  Goal: Absence of fever/infection during neutropenic period  Description  INTERVENTIONS:  - Monitor WBC    Outcome: Adequate for Discharge     Problem: SAFETY ADULT  Goal: Patient will remain free of falls  Description  INTERVENTIONS:  - Assess patient frequently for physical needs  -  Identify cognitive and physical deficits and behaviors that affect risk of falls    -  [place] fall precautions as indicated by assessment   - Educate patient/family on patient safety including physical limitations  - Instruct patient to call for assistance with activity based on assessment  - Modify environment to reduce risk of injury  - Consider OT/PT consult to assist with strengthening/mobility  Outcome: Adequate for Discharge  Goal: Maintain or return to baseline ADL function  Description  INTERVENTIONS:  -  Assess patient's ability to carry out ADLs; assess patient's baseline for ADL function and identify physical deficits which impact ability to perform ADLs (bathing, care of mouth/teeth, toileting, grooming, dressing, etc )  - Assess/evaluate cause of self-care deficits   - Assess range of motion  - Assess patient's mobility; develop plan if impaired  - Assess patient's need for assistive devices and provide as appropriate  - Encourage maximum independence but intervene and supervise when necessary  - Involve family in performance of ADLs  - Assess for home care needs following discharge   - Consider OT consult to assist with ADL evaluation and planning for discharge  - Provide patient education as appropriate  Outcome: Adequate for Discharge  Goal: Maintain or return mobility status to optimal level  Description  INTERVENTIONS:  - Assess patient's baseline mobility status (ambulation, transfers, stairs, etc )    - Identify cognitive and physical deficits and behaviors that affect mobility  - Identify mobility aids required to assist with transfers and/or ambulation (gait belt, sit-to-stand, lift, walker, cane, etc )  - [place] fall precautions as indicated by assessment  - Record patient progress and toleration of activity level on Mobility SBAR; progress patient to next Phase/Stage  - Instruct patient to call for assistance with activity based on assessment  - Consider rehabilitation consult to assist with strengthening/weightbearing, etc   Outcome: Adequate for Discharge     Problem: DISCHARGE PLANNING  Goal: Discharge to home or other facility with appropriate resources  Description  INTERVENTIONS:  - Identify barriers to discharge w/patient and caregiver  - Arrange for needed discharge resources and transportation as appropriate  - Identify discharge learning needs (meds, wound care, etc )  - Arrange for interpretive services to assist at discharge as needed  - Refer to Case Management Department for coordinating discharge planning if the patient needs post-hospital services based on physician/advanced practitioner order or complex needs related to functional status, cognitive ability, or social support system  Outcome: Adequate for Discharge     Problem: Knowledge Deficit  Goal: Patient/family/caregiver demonstrates understanding of disease process, treatment plan, medications, and discharge instructions  Description  Complete learning assessment and assess knowledge base    Interventions:  - Provide teaching at level of understanding  - Provide teaching via preferred learning methods  Outcome: Adequate for Discharge

## 2019-09-19 NOTE — UTILIZATION REVIEW
Notification of Discharge  This is a Notification of Discharge from our facility [address]  Please be advised that this patient has been discharge from our facility  Below you will find the admission and discharge date and time including the patients disposition  PRESENTATION DATE: 9/15/2019  3:37 PM  OBS ADMISSION DATE:   IP ADMISSION DATE: 9/15/19 1711   DISCHARGE DATE: 9/18/2019  3:00 PM  DISPOSITION: Non [facility] Non [facility]   Admission Orders listed below:  Admission Orders (From admission, onward)     Ordered        09/15/19 1711  Inpatient Admission (expected length of stay for this patient Order details is greater than two midnights)  Once                   Please contact the UR Department if additional information is required to close this patient's authorization/case  [address]  Utilization Review Department  Phone: [phone]; Fax [phone]  [email or web address]  ATTENTION: Please call with any questions or concerns to [phone]  and carefully listen to the prompts so that you are directed to the right person  Send all requests for admission clinical reviews, approved or denied determinations and any other requests to fax [phone]   All voicemails are confidential

## 2019-09-21 LAB
BACTERIA BLD CULT: NORMAL
BACTERIA BLD CULT: NORMAL

## 2019-09-25 ENCOUNTER — APPOINTMENT (EMERGENCY)
Dept: RADIOLOGY | Facility: HOSPITAL | Age: 79
DRG: 682 | End: 2019-09-25
Payer: COMMERCIAL

## 2019-09-25 ENCOUNTER — HOSPITAL ENCOUNTER (INPATIENT)
Facility: HOSPITAL | Age: 79
LOS: 4 days | Discharge: NURSING, REHAB OR LONG-TERM CARE | DRG: 682 | End: 2019-09-30
Attending: EMERGENCY MEDICINE | Admitting: FAMILY MEDICINE
Payer: COMMERCIAL

## 2019-09-25 DIAGNOSIS — E44.0 MODERATE PROTEIN-CALORIE MALNUTRITION (HCC): Chronic | ICD-10-CM

## 2019-09-25 DIAGNOSIS — N17.9 ACUTE RENAL FAILURE SUPERIMPOSED ON STAGE 4 CHRONIC KIDNEY DISEASE (HCC): ICD-10-CM

## 2019-09-25 DIAGNOSIS — C7B.8 NEUROENDOCRINE CARCINOMA METASTATIC TO LIVER (HCC): ICD-10-CM

## 2019-09-25 DIAGNOSIS — K85.90 ACUTE PANCREATITIS: Primary | ICD-10-CM

## 2019-09-25 DIAGNOSIS — N18.4 ACUTE RENAL FAILURE SUPERIMPOSED ON STAGE 4 CHRONIC KIDNEY DISEASE (HCC): ICD-10-CM

## 2019-09-25 DIAGNOSIS — D64.9 ANEMIA: ICD-10-CM

## 2019-09-25 DIAGNOSIS — C7A.8 NEUROENDOCRINE CARCINOMA METASTATIC TO LIVER (HCC): ICD-10-CM

## 2019-09-25 DIAGNOSIS — Z43.4 CHOLECYSTOSTOMY CARE (HCC): ICD-10-CM

## 2019-09-25 DIAGNOSIS — N17.9 AKI (ACUTE KIDNEY INJURY) (HCC): ICD-10-CM

## 2019-09-25 DIAGNOSIS — K85.90 ACUTE PANCREATITIS WITHOUT INFECTION OR NECROSIS: ICD-10-CM

## 2019-09-25 LAB
ALBUMIN SERPL BCP-MCNC: 2.8 G/DL (ref 3.5–5)
ALP SERPL-CCNC: 239 U/L (ref 46–116)
ALT SERPL W P-5'-P-CCNC: 45 U/L (ref 12–78)
ANION GAP SERPL CALCULATED.3IONS-SCNC: 11 MMOL/L (ref 4–13)
AST SERPL W P-5'-P-CCNC: 31 U/L (ref 5–45)
BASOPHILS # BLD AUTO: 0.09 THOUSANDS/ΜL (ref 0–0.1)
BASOPHILS NFR BLD AUTO: 1 % (ref 0–1)
BILIRUB SERPL-MCNC: 0.3 MG/DL (ref 0.2–1)
BUN SERPL-MCNC: 50 MG/DL (ref 5–25)
CALCIUM SERPL-MCNC: 8.6 MG/DL (ref 8.3–10.1)
CHLORIDE SERPL-SCNC: 103 MMOL/L (ref 100–108)
CO2 SERPL-SCNC: 21 MMOL/L (ref 21–32)
CREAT SERPL-MCNC: 3.06 MG/DL (ref 0.6–1.3)
EOSINOPHIL # BLD AUTO: 0.14 THOUSAND/ΜL (ref 0–0.61)
EOSINOPHIL NFR BLD AUTO: 2 % (ref 0–6)
ERYTHROCYTE [DISTWIDTH] IN BLOOD BY AUTOMATED COUNT: 14.3 % (ref 11.6–15.1)
GFR SERPL CREATININE-BSD FRML MDRD: 18 ML/MIN/1.73SQ M
GLUCOSE SERPL-MCNC: 253 MG/DL (ref 65–140)
HCT VFR BLD AUTO: 31.4 % (ref 36.5–49.3)
HGB BLD-MCNC: 10.3 G/DL (ref 12–17)
IMM GRANULOCYTES # BLD AUTO: 0.03 THOUSAND/UL (ref 0–0.2)
IMM GRANULOCYTES NFR BLD AUTO: 0 % (ref 0–2)
LACTATE SERPL-SCNC: 2.5 MMOL/L (ref 0.5–2)
LIPASE SERPL-CCNC: 1155 U/L (ref 73–393)
LYMPHOCYTES # BLD AUTO: 1.79 THOUSANDS/ΜL (ref 0.6–4.47)
LYMPHOCYTES NFR BLD AUTO: 19 % (ref 14–44)
MCH RBC QN AUTO: 29.3 PG (ref 26.8–34.3)
MCHC RBC AUTO-ENTMCNC: 32.8 G/DL (ref 31.4–37.4)
MCV RBC AUTO: 89 FL (ref 82–98)
MONOCYTES # BLD AUTO: 0.71 THOUSAND/ΜL (ref 0.17–1.22)
MONOCYTES NFR BLD AUTO: 7 % (ref 4–12)
NEUTROPHILS # BLD AUTO: 6.81 THOUSANDS/ΜL (ref 1.85–7.62)
NEUTS SEG NFR BLD AUTO: 71 % (ref 43–75)
NRBC BLD AUTO-RTO: 0 /100 WBCS
PLATELET # BLD AUTO: 216 THOUSANDS/UL (ref 149–390)
PMV BLD AUTO: 10.3 FL (ref 8.9–12.7)
POTASSIUM SERPL-SCNC: 4.4 MMOL/L (ref 3.5–5.3)
PROT SERPL-MCNC: 6.8 G/DL (ref 6.4–8.2)
RBC # BLD AUTO: 3.52 MILLION/UL (ref 3.88–5.62)
SODIUM SERPL-SCNC: 135 MMOL/L (ref 136–145)
TROPONIN I SERPL-MCNC: <0.02 NG/ML
WBC # BLD AUTO: 9.57 THOUSAND/UL (ref 4.31–10.16)

## 2019-09-25 PROCEDURE — 93005 ELECTROCARDIOGRAM TRACING: CPT

## 2019-09-25 PROCEDURE — 87040 BLOOD CULTURE FOR BACTERIA: CPT | Performed by: EMERGENCY MEDICINE

## 2019-09-25 PROCEDURE — 84484 ASSAY OF TROPONIN QUANT: CPT | Performed by: EMERGENCY MEDICINE

## 2019-09-25 PROCEDURE — 83605 ASSAY OF LACTIC ACID: CPT | Performed by: EMERGENCY MEDICINE

## 2019-09-25 PROCEDURE — 36415 COLL VENOUS BLD VENIPUNCTURE: CPT | Performed by: EMERGENCY MEDICINE

## 2019-09-25 PROCEDURE — 85025 COMPLETE CBC W/AUTO DIFF WBC: CPT | Performed by: EMERGENCY MEDICINE

## 2019-09-25 PROCEDURE — 83690 ASSAY OF LIPASE: CPT | Performed by: EMERGENCY MEDICINE

## 2019-09-25 PROCEDURE — 99285 EMERGENCY DEPT VISIT HI MDM: CPT | Performed by: EMERGENCY MEDICINE

## 2019-09-25 PROCEDURE — 80053 COMPREHEN METABOLIC PANEL: CPT | Performed by: EMERGENCY MEDICINE

## 2019-09-25 PROCEDURE — 71045 X-RAY EXAM CHEST 1 VIEW: CPT

## 2019-09-25 PROCEDURE — 99285 EMERGENCY DEPT VISIT HI MDM: CPT

## 2019-09-25 RX ORDER — ACETAMINOPHEN 325 MG/1
650 TABLET ORAL EVERY 6 HOURS PRN
COMMUNITY

## 2019-09-25 RX ORDER — OXYCODONE HYDROCHLORIDE AND ACETAMINOPHEN 5; 325 MG/1; MG/1
1 TABLET ORAL AS NEEDED
Status: ON HOLD | COMMUNITY
End: 2020-01-01 | Stop reason: ALTCHOICE

## 2019-09-26 ENCOUNTER — APPOINTMENT (EMERGENCY)
Dept: CT IMAGING | Facility: HOSPITAL | Age: 79
DRG: 682 | End: 2019-09-26
Payer: COMMERCIAL

## 2019-09-26 PROBLEM — K85.90 ACUTE PANCREATITIS WITHOUT INFECTION OR NECROSIS: Status: ACTIVE | Noted: 2019-09-26

## 2019-09-26 PROBLEM — N18.4 ACUTE RENAL FAILURE SUPERIMPOSED ON STAGE 4 CHRONIC KIDNEY DISEASE (HCC): Status: ACTIVE | Noted: 2019-04-01

## 2019-09-26 PROBLEM — T85.518A CHOLECYSTOSTOMY TUBE DYSFUNCTION: Status: RESOLVED | Noted: 2019-09-26 | Resolved: 2019-09-26

## 2019-09-26 PROBLEM — T85.518A CHOLECYSTOSTOMY TUBE DYSFUNCTION: Status: ACTIVE | Noted: 2019-09-26

## 2019-09-26 PROBLEM — N18.4 TYPE 2 DIABETES MELLITUS WITH STAGE 4 CHRONIC KIDNEY DISEASE, WITH LONG-TERM CURRENT USE OF INSULIN (HCC): Chronic | Status: ACTIVE | Noted: 2019-01-23

## 2019-09-26 PROBLEM — N17.9 ACUTE RENAL FAILURE SUPERIMPOSED ON STAGE 4 CHRONIC KIDNEY DISEASE (HCC): Status: ACTIVE | Noted: 2019-04-01

## 2019-09-26 LAB
ALBUMIN SERPL BCP-MCNC: 2.6 G/DL (ref 3.5–5)
ALP SERPL-CCNC: 212 U/L (ref 46–116)
ALT SERPL W P-5'-P-CCNC: 31 U/L (ref 12–78)
ANION GAP SERPL CALCULATED.3IONS-SCNC: 12 MMOL/L (ref 4–13)
AST SERPL W P-5'-P-CCNC: 22 U/L (ref 5–45)
ATRIAL RATE: 79 BPM
BASOPHILS # BLD AUTO: 0.1 THOUSANDS/ΜL (ref 0–0.1)
BASOPHILS NFR BLD AUTO: 1 % (ref 0–1)
BILIRUB SERPL-MCNC: 0.4 MG/DL (ref 0.2–1)
BUN SERPL-MCNC: 46 MG/DL (ref 5–25)
CALCIUM SERPL-MCNC: 8.4 MG/DL (ref 8.3–10.1)
CHLORIDE SERPL-SCNC: 106 MMOL/L (ref 100–108)
CO2 SERPL-SCNC: 22 MMOL/L (ref 21–32)
CREAT SERPL-MCNC: 2.86 MG/DL (ref 0.6–1.3)
EOSINOPHIL # BLD AUTO: 0.14 THOUSAND/ΜL (ref 0–0.61)
EOSINOPHIL NFR BLD AUTO: 2 % (ref 0–6)
ERYTHROCYTE [DISTWIDTH] IN BLOOD BY AUTOMATED COUNT: 14.4 % (ref 11.6–15.1)
GFR SERPL CREATININE-BSD FRML MDRD: 20 ML/MIN/1.73SQ M
GLUCOSE SERPL-MCNC: 128 MG/DL (ref 65–140)
GLUCOSE SERPL-MCNC: 134 MG/DL (ref 65–140)
GLUCOSE SERPL-MCNC: 138 MG/DL (ref 65–140)
GLUCOSE SERPL-MCNC: 146 MG/DL (ref 65–140)
GLUCOSE SERPL-MCNC: 163 MG/DL (ref 65–140)
HCT VFR BLD AUTO: 31 % (ref 36.5–49.3)
HGB BLD-MCNC: 10.1 G/DL (ref 12–17)
IMM GRANULOCYTES # BLD AUTO: 0.03 THOUSAND/UL (ref 0–0.2)
IMM GRANULOCYTES NFR BLD AUTO: 0 % (ref 0–2)
INR PPP: 1.01 (ref 0.84–1.19)
LACTATE SERPL-SCNC: 1.9 MMOL/L (ref 0.5–2)
LIPASE SERPL-CCNC: 676 U/L (ref 73–393)
LYMPHOCYTES # BLD AUTO: 1.58 THOUSANDS/ΜL (ref 0.6–4.47)
LYMPHOCYTES NFR BLD AUTO: 18 % (ref 14–44)
MAGNESIUM SERPL-MCNC: 1.9 MG/DL (ref 1.6–2.6)
MCH RBC QN AUTO: 29.8 PG (ref 26.8–34.3)
MCHC RBC AUTO-ENTMCNC: 32.6 G/DL (ref 31.4–37.4)
MCV RBC AUTO: 91 FL (ref 82–98)
MONOCYTES # BLD AUTO: 0.72 THOUSAND/ΜL (ref 0.17–1.22)
MONOCYTES NFR BLD AUTO: 8 % (ref 4–12)
NEUTROPHILS # BLD AUTO: 6 THOUSANDS/ΜL (ref 1.85–7.62)
NEUTS SEG NFR BLD AUTO: 71 % (ref 43–75)
NRBC BLD AUTO-RTO: 0 /100 WBCS
P AXIS: 42 DEGREES
PHOSPHATE SERPL-MCNC: 4.4 MG/DL (ref 2.3–4.1)
PLATELET # BLD AUTO: 209 THOUSANDS/UL (ref 149–390)
PMV BLD AUTO: 10.8 FL (ref 8.9–12.7)
POTASSIUM SERPL-SCNC: 4.5 MMOL/L (ref 3.5–5.3)
PR INTERVAL: 130 MS
PROT SERPL-MCNC: 6.6 G/DL (ref 6.4–8.2)
PROTHROMBIN TIME: 13.3 SECONDS (ref 11.6–14.5)
QRS AXIS: -63 DEGREES
QRSD INTERVAL: 106 MS
QT INTERVAL: 392 MS
QTC INTERVAL: 449 MS
RBC # BLD AUTO: 3.39 MILLION/UL (ref 3.88–5.62)
SODIUM SERPL-SCNC: 140 MMOL/L (ref 136–145)
T WAVE AXIS: 88 DEGREES
VENTRICULAR RATE: 79 BPM
WBC # BLD AUTO: 8.57 THOUSAND/UL (ref 4.31–10.16)

## 2019-09-26 PROCEDURE — NC001 PR NO CHARGE: Performed by: SURGERY

## 2019-09-26 PROCEDURE — 99223 1ST HOSP IP/OBS HIGH 75: CPT | Performed by: INTERNAL MEDICINE

## 2019-09-26 PROCEDURE — 94664 DEMO&/EVAL PT USE INHALER: CPT

## 2019-09-26 PROCEDURE — 36415 COLL VENOUS BLD VENIPUNCTURE: CPT | Performed by: EMERGENCY MEDICINE

## 2019-09-26 PROCEDURE — 85025 COMPLETE CBC W/AUTO DIFF WBC: CPT | Performed by: FAMILY MEDICINE

## 2019-09-26 PROCEDURE — 87040 BLOOD CULTURE FOR BACTERIA: CPT | Performed by: EMERGENCY MEDICINE

## 2019-09-26 PROCEDURE — 82948 REAGENT STRIP/BLOOD GLUCOSE: CPT

## 2019-09-26 PROCEDURE — 87081 CULTURE SCREEN ONLY: CPT | Performed by: FAMILY MEDICINE

## 2019-09-26 PROCEDURE — 84100 ASSAY OF PHOSPHORUS: CPT | Performed by: FAMILY MEDICINE

## 2019-09-26 PROCEDURE — 83735 ASSAY OF MAGNESIUM: CPT | Performed by: FAMILY MEDICINE

## 2019-09-26 PROCEDURE — 74176 CT ABD & PELVIS W/O CONTRAST: CPT

## 2019-09-26 PROCEDURE — 93010 ELECTROCARDIOGRAM REPORT: CPT | Performed by: INTERNAL MEDICINE

## 2019-09-26 PROCEDURE — 97167 OT EVAL HIGH COMPLEX 60 MIN: CPT

## 2019-09-26 PROCEDURE — 97163 PT EVAL HIGH COMPLEX 45 MIN: CPT | Performed by: PHYSICAL THERAPIST

## 2019-09-26 PROCEDURE — 83605 ASSAY OF LACTIC ACID: CPT | Performed by: EMERGENCY MEDICINE

## 2019-09-26 PROCEDURE — G8979 MOBILITY GOAL STATUS: HCPCS | Performed by: PHYSICAL THERAPIST

## 2019-09-26 PROCEDURE — 80053 COMPREHEN METABOLIC PANEL: CPT | Performed by: FAMILY MEDICINE

## 2019-09-26 PROCEDURE — 99222 1ST HOSP IP/OBS MODERATE 55: CPT | Performed by: SURGERY

## 2019-09-26 PROCEDURE — 99222 1ST HOSP IP/OBS MODERATE 55: CPT | Performed by: FAMILY MEDICINE

## 2019-09-26 PROCEDURE — G8988 SELF CARE GOAL STATUS: HCPCS

## 2019-09-26 PROCEDURE — G8978 MOBILITY CURRENT STATUS: HCPCS | Performed by: PHYSICAL THERAPIST

## 2019-09-26 PROCEDURE — 94640 AIRWAY INHALATION TREATMENT: CPT

## 2019-09-26 PROCEDURE — G8987 SELF CARE CURRENT STATUS: HCPCS

## 2019-09-26 PROCEDURE — 83690 ASSAY OF LIPASE: CPT | Performed by: FAMILY MEDICINE

## 2019-09-26 PROCEDURE — 96361 HYDRATE IV INFUSION ADD-ON: CPT

## 2019-09-26 PROCEDURE — 94760 N-INVAS EAR/PLS OXIMETRY 1: CPT

## 2019-09-26 PROCEDURE — 96360 HYDRATION IV INFUSION INIT: CPT

## 2019-09-26 PROCEDURE — 85610 PROTHROMBIN TIME: CPT | Performed by: FAMILY MEDICINE

## 2019-09-26 RX ORDER — ALBUTEROL SULFATE 2.5 MG/3ML
2.5 SOLUTION RESPIRATORY (INHALATION) EVERY 6 HOURS PRN
Status: DISCONTINUED | OUTPATIENT
Start: 2019-09-26 | End: 2019-09-30 | Stop reason: HOSPADM

## 2019-09-26 RX ORDER — ONDANSETRON 2 MG/ML
4 INJECTION INTRAMUSCULAR; INTRAVENOUS EVERY 6 HOURS PRN
Status: DISCONTINUED | OUTPATIENT
Start: 2019-09-26 | End: 2019-09-30 | Stop reason: HOSPADM

## 2019-09-26 RX ORDER — AMLODIPINE BESYLATE 10 MG/1
10 TABLET ORAL DAILY
Status: DISCONTINUED | OUTPATIENT
Start: 2019-09-26 | End: 2019-09-30 | Stop reason: HOSPADM

## 2019-09-26 RX ORDER — DEXTROSE MONOHYDRATE 25 G/50ML
25 INJECTION, SOLUTION INTRAVENOUS AS NEEDED
Status: DISCONTINUED | OUTPATIENT
Start: 2019-09-26 | End: 2019-09-30 | Stop reason: HOSPADM

## 2019-09-26 RX ORDER — TAMSULOSIN HYDROCHLORIDE 0.4 MG/1
0.4 CAPSULE ORAL
Status: DISCONTINUED | OUTPATIENT
Start: 2019-09-26 | End: 2019-09-30 | Stop reason: HOSPADM

## 2019-09-26 RX ORDER — COLCHICINE 0.6 MG/1
0.6 TABLET ORAL DAILY
Status: DISCONTINUED | OUTPATIENT
Start: 2019-09-26 | End: 2019-09-26

## 2019-09-26 RX ORDER — MELATONIN
1000 DAILY
Status: DISCONTINUED | OUTPATIENT
Start: 2019-09-26 | End: 2019-09-30 | Stop reason: HOSPADM

## 2019-09-26 RX ORDER — SODIUM BICARBONATE 650 MG/1
650 TABLET ORAL
Status: DISCONTINUED | OUTPATIENT
Start: 2019-09-26 | End: 2019-09-30 | Stop reason: HOSPADM

## 2019-09-26 RX ORDER — METHENAMINE HIPPURATE 1000 MG/1
1 TABLET ORAL 2 TIMES DAILY WITH MEALS
Status: DISCONTINUED | OUTPATIENT
Start: 2019-09-26 | End: 2019-09-30 | Stop reason: HOSPADM

## 2019-09-26 RX ORDER — HEPARIN SODIUM 5000 [USP'U]/ML
5000 INJECTION, SOLUTION INTRAVENOUS; SUBCUTANEOUS EVERY 8 HOURS SCHEDULED
Status: DISCONTINUED | OUTPATIENT
Start: 2019-09-26 | End: 2019-09-30 | Stop reason: HOSPADM

## 2019-09-26 RX ORDER — SODIUM CHLORIDE 9 MG/ML
150 INJECTION, SOLUTION INTRAVENOUS CONTINUOUS
Status: DISCONTINUED | OUTPATIENT
Start: 2019-09-26 | End: 2019-09-26

## 2019-09-26 RX ORDER — MAGNESIUM HYDROXIDE/ALUMINUM HYDROXICE/SIMETHICONE 120; 1200; 1200 MG/30ML; MG/30ML; MG/30ML
30 SUSPENSION ORAL EVERY 6 HOURS PRN
Status: DISCONTINUED | OUTPATIENT
Start: 2019-09-26 | End: 2019-09-30 | Stop reason: HOSPADM

## 2019-09-26 RX ORDER — PANTOPRAZOLE SODIUM 40 MG/1
40 TABLET, DELAYED RELEASE ORAL
Status: DISCONTINUED | OUTPATIENT
Start: 2019-09-26 | End: 2019-09-30 | Stop reason: HOSPADM

## 2019-09-26 RX ORDER — ASPIRIN 81 MG/1
81 TABLET, CHEWABLE ORAL DAILY
Status: DISCONTINUED | OUTPATIENT
Start: 2019-09-26 | End: 2019-09-30 | Stop reason: HOSPADM

## 2019-09-26 RX ORDER — DEXTROSE AND SODIUM CHLORIDE 5; .9 G/100ML; G/100ML
100 INJECTION, SOLUTION INTRAVENOUS CONTINUOUS
Status: DISCONTINUED | OUTPATIENT
Start: 2019-09-26 | End: 2019-09-27

## 2019-09-26 RX ORDER — CHOLECALCIFEROL (VITAMIN D3) 10 MCG
1 TABLET ORAL
Status: DISCONTINUED | OUTPATIENT
Start: 2019-09-26 | End: 2019-09-30 | Stop reason: HOSPADM

## 2019-09-26 RX ORDER — DOCUSATE SODIUM 100 MG/1
100 CAPSULE, LIQUID FILLED ORAL 2 TIMES DAILY PRN
Status: DISCONTINUED | OUTPATIENT
Start: 2019-09-26 | End: 2019-09-30 | Stop reason: HOSPADM

## 2019-09-26 RX ADMIN — SODIUM CHLORIDE 150 ML/HR: 0.9 INJECTION, SOLUTION INTRAVENOUS at 02:45

## 2019-09-26 RX ADMIN — AMLODIPINE BESYLATE 10 MG: 10 TABLET ORAL at 08:36

## 2019-09-26 RX ADMIN — PANTOPRAZOLE SODIUM 40 MG: 40 TABLET, DELAYED RELEASE ORAL at 05:18

## 2019-09-26 RX ADMIN — METHENAMINE HIPPURATE 1 G: 1 TABLET ORAL at 08:46

## 2019-09-26 RX ADMIN — VITAMIN D, TAB 1000IU (100/BT) 1000 UNITS: 25 TAB at 08:36

## 2019-09-26 RX ADMIN — HEPARIN SODIUM 5000 UNITS: 5000 INJECTION INTRAVENOUS; SUBCUTANEOUS at 13:47

## 2019-09-26 RX ADMIN — Medication 1 CAPSULE: at 17:33

## 2019-09-26 RX ADMIN — ASPIRIN 81 MG 81 MG: 81 TABLET ORAL at 08:36

## 2019-09-26 RX ADMIN — SODIUM CHLORIDE 1000 ML: 0.9 INJECTION, SOLUTION INTRAVENOUS at 00:24

## 2019-09-26 RX ADMIN — INSULIN LISPRO 1 UNITS: 100 INJECTION, SOLUTION INTRAVENOUS; SUBCUTANEOUS at 11:44

## 2019-09-26 RX ADMIN — DEXTROSE AND SODIUM CHLORIDE 100 ML/HR: 5; .9 INJECTION, SOLUTION INTRAVENOUS at 05:18

## 2019-09-26 RX ADMIN — HEPARIN SODIUM 5000 UNITS: 5000 INJECTION INTRAVENOUS; SUBCUTANEOUS at 21:53

## 2019-09-26 RX ADMIN — ALBUTEROL SULFATE 2.5 MG: 2.5 SOLUTION RESPIRATORY (INHALATION) at 20:47

## 2019-09-26 RX ADMIN — DEXTROSE AND SODIUM CHLORIDE 100 ML/HR: 5; .9 INJECTION, SOLUTION INTRAVENOUS at 13:50

## 2019-09-26 RX ADMIN — COLCHICINE 0.6 MG: 0.6 TABLET, FILM COATED ORAL at 08:36

## 2019-09-26 RX ADMIN — METHENAMINE HIPPURATE 1 G: 1 TABLET ORAL at 17:42

## 2019-09-26 RX ADMIN — INSULIN DETEMIR 10 UNITS: 100 INJECTION, SOLUTION SUBCUTANEOUS at 21:54

## 2019-09-26 RX ADMIN — TAMSULOSIN HYDROCHLORIDE 0.4 MG: 0.4 CAPSULE ORAL at 17:33

## 2019-09-26 RX ADMIN — HEPARIN SODIUM 5000 UNITS: 5000 INJECTION INTRAVENOUS; SUBCUTANEOUS at 05:18

## 2019-09-26 RX ADMIN — SODIUM BICARBONATE 650 MG TABLET 650 MG: at 08:37

## 2019-09-26 RX ADMIN — DEXTROSE AND SODIUM CHLORIDE 100 ML/HR: 5; .9 INJECTION, SOLUTION INTRAVENOUS at 21:54

## 2019-09-26 RX ADMIN — SODIUM BICARBONATE 650 MG TABLET 650 MG: at 17:33

## 2019-09-26 NOTE — QUICK NOTE
I discussed the case with General surgery  Keep NPO status for now  Consult Gastroenterology  Continue IV fluids  The patient requires serial laboratory testing to monitor the patient's renal function and electrolytes  The lactic acidosis has resolved

## 2019-09-26 NOTE — CONSULTS
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  Known biliary sludge s/p percutaneous cholecystostomy tube placement  Cholecystostomy tube dysfunction - RESOLVED  Chronic garcia catheter d/t retention  SILVERIO on CKD4, Improving, (Cr 3 06 on admission, 2 86 this AM, baseline 2 0)  Diabetes mellitus  Lipase 1155 on admission, improving, 676 this AM  Lactic acidosis on admission, RESOLVED (2 5, now 1 9)  Abefrile  Vitals stable  WBC 8 57    Plan: Discussed with Dr Aryan Gonzalez who will see the patient later today  No acute surgical intervention at this time, elective cholecystectomy at a later time  Continue conservative treatment  Follow labs qAM to include CBC, CMP,  and lipase  IFV hydration  Monitor I/Os  Garcia care per nursing protocol  Nephrology consulted for SILVERIO on CKD4  Resume home medications for chronic medical comorbidities      History of Present Illness     HPI:  Manav Fishman is a [de-identified] y o  male who presented to the [facility] ED on 9/25/2019 from the [facility] with complaints of epigastric pain as well as an obstructed cholecystostomy tube  The tube was placed by IR on 9/6/2019 d/t biliary sludge  Plan is for the patient to undergo elective cholecystectomy in the future, He has seen Dr Lavell Ivey in consultation in the past, last visit 6/26/2019  The patient is also followed by Dr Chon Ivey for a known neuroendocrine tumor which is metastatic to his liver   He undergoes monthly chemo injections for this  The patient states that he began experiencing current pain and increased flatulence after eating an egg salad sandwich yesterday  He was given a Percocet which resolved the pain  While in the ED the cholecystostomy tubed was flushed and began draining bile  He states that his pain has completely resolved since admission  Workup in the ED revealed a Lipase of 1155 (was 138 last week)  He denies fevers/chills, Denies diarrhea or constipation  Denies CP or or SOB  Inpatient consult to Acute Care Surgery  Consult performed by: Laney Carty PA-C  Consult ordered by: Jacky Gagnon MD        Review of Systems   Constitutional: Positive for appetite change  Gastrointestinal: Positive for abdominal pain  Neurological: Positive for weakness  All other systems reviewed and are negative  Historical Information   Past Medical History:   Diagnosis Date    Acute pancreatitis without infection or necrosis 9/26/2019    BPH (benign prostatic hyperplasia)     Cancer (HCC)     liver cancer    Cardiac disease     Coronary artery disease     Diabetes mellitus (HCC)     DJD (degenerative joint disease)     Gait disturbance     Generalized weakness     GERD (gastroesophageal reflux disease)     Gout     History of transfusion     Hyperlipidemia     Hypertension     Pressure injury of skin     Renal disorder     Type 2 diabetes 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, with long-term current use of insulin ([facility] [address] ) 1/23/2019     Past Surgical History:   Procedure Laterality Date    CORONARY ANGIOPLASTY WITH STENT PLACEMENT      IR IMAGE GUIDED BIOPSY/ASPIRATION LIVER  1/24/2019    IR TUBE PLACEMENT ROQUE  9/6/2019     Social History   Social History     Substance and Sexual Activity   Alcohol Use Never    Frequency: Never     Social History     Substance and Sexual Activity   Drug Use Never     Social History     Tobacco Use   Smoking Status Never Smoker   Smokeless Tobacco Never Used     Family History:   Mother: [place]  Brother: [place]     Meds/Allergies      current meds:   Current Facility-Administered Medications   Medication Dose Route Frequency    aluminum-magnesium hydroxide-simethicone (MYLANTA) 200-200-20 mg/5 mL oral suspension 30 mL  30 mL Oral Q6H PRN    amLODIPine (NORVASC) tablet 10 mg  10 mg Oral Daily    aspirin chewable tablet 81 mg  81 mg Oral Daily    b complex-vitamin C-folic acid (NEPHROCAPS) capsule 1 capsule  1 capsule Oral Daily With Dinner    cholecalciferol (VITAMIN D3) tablet 1,000 Units  1,000 Units Oral Daily    dextrose 5 % and sodium chloride 0 9 % infusion  100 mL/hr Intravenous Continuous    dextrose 50 % IV solution 25 mL  25 mL Intravenous PRN    docusate sodium (COLACE) capsule 100 mg  100 mg Oral BID PRN    heparin (porcine) subcutaneous injection 5,000 Units  5,000 Units Subcutaneous Q8H [address]    insulin detemir (LEVEMIR) subcutaneous injection 10 Units  10 Units Subcutaneous HS    insulin lispro (HumaLOG) 100 units/mL subcutaneous injection 1-5 Units  1-5 Units Subcutaneous TID AC    insulin lispro (HumaLOG) 100 units/mL subcutaneous injection 1-5 Units  1-5 Units Subcutaneous HS    methenamine hippurate (HIPREX) tablet 1 g  1 g Oral BID With Meals    ondansetron (ZOFRAN) injection 4 mg  4 mg Intravenous Q6H PRN    pantoprazole (PROTONIX) EC tablet 40 mg  40 mg Oral Early Morning    sodium bicarbonate tablet 650 mg  650 mg Oral BID after meals    tamsulosin (FLOMAX) capsule 0 4 mg  0 4 mg Oral Daily With Dinner     No Known Allergies    Objective   First Vitals:   Blood Pressure: 129/71 (09/25/19 2243)  Pulse: 82 (09/25/19 2243)  Temperature: 99 °F (37 2 °C) (09/25/19 2243)  Temp Source: Temporal (09/25/19 2243)  Respirations: 18 (09/25/19 2243)  Height: 5' 8" (172 7 cm) (09/25/19 2243)  Weight - Scale: 70 9 kg (156 lb 4 9 oz) (09/25/19 2243)  SpO2: 97 % (09/25/19 2243)    Current Vitals:   Blood Pressure: 129/58 (09/26/19 0823)  Pulse: 79 (09/26/19 0823)  Temperature: 97 6 °F (36 4 °C) (09/26/19 0408)  Temp Source: Oral (09/26/19 0408)  Respirations: 18 (09/26/19 0823)  Height: 5' 8" (172 7 cm) (09/26/19 0408)  Weight - Scale: 68 kg (149 lb 14 6 oz) (09/26/19 0408)  SpO2: 98 % (09/26/19 0823)      Intake/Output Summary (Last 24 hours) at 9/26/2019 1140  Last data filed at 9/26/2019 0408  Gross per 24 hour   Intake    Output 360 ml   Net -360 ml       Invasive Devices     Peripheral Intravenous Line            Peripheral IV 09/25/19 Right Forearm less than 1 day          Drain            Urethral Catheter Latex 16 Fr  89 days    Closed/Suction Drain Right RUQ Bulb 10 2 Fr  19 days    Closed/Suction Drain Lateral RLQ Bulb less than 1 day                Physical Exam   Constitutional: No distress  Ill appearing gentleman   HENT:   Head: Normocephalic and atraumatic  Oropharynx is dry   Eyes: Pupils are equal, round, and reactive to light  Right eye exhibits no discharge  Left eye exhibits no discharge  No scleral icterus  Neck: Normal range of motion  Neck supple  No tracheal deviation present  Cardiovascular: Normal rate, regular rhythm, normal heart sounds and intact distal pulses  Pulmonary/Chest: Effort normal and breath sounds normal  No respiratory distress  Abdominal: Soft  Bowel sounds are normal  He exhibits no distension  There is no tenderness  There is no rebound and no guarding  No hernia  Cholecystostomy tube present in RUQ  Genitourinary:   Genitourinary Comments: Haile catheter is in place   Musculoskeletal: Normal range of motion  He exhibits no edema  Neurological: He is alert  Oriented to person and place but not time   Skin: Skin is warm and dry  Capillary refill takes less than 2 seconds  Psychiatric: He has a normal mood and affect  His behavior is normal    Vitals reviewed  Lab Results:   I have personally reviewed pertinent lab results      CBC:   Lab Results   Component Value Date    WBC 8 57 09/26/2019    HGB 10 1 (L) 09/26/2019    HCT 31 0 (L) 09/26/2019    MCV 91 09/26/2019     09/26/2019    MCH 29 8 09/26/2019    MCHC 32 6 09/26/2019    RDW 14 4 09/26/2019    MPV 10 8 09/26/2019    NRBC 0 09/26/2019     CMP:   Lab Results   Component Value Date    SODIUM 140 09/26/2019    K 4 5 09/26/2019     09/26/2019    CO2 22 09/26/2019    BUN 46 (H) 09/26/2019    CREATININE 2 86 (H) 09/26/2019    CALCIUM 8 4 09/26/2019    AST 22 09/26/2019    ALT 31 09/26/2019    ALKPHOS 212 (H) 09/26/2019    EGFR 20 09/26/2019     Coagulation:   Lab Results   Component Value Date    INR 1 01 09/26/2019     Lipase:   Lab Results   Component Value Date    LIPASE 676 (H) 09/26/2019     Imaging: I have personally reviewed pertinent reports  CT abdomen pelvis wo contrast 9/29/2019  Impression:       Previously seen partially calcified mass over the mesentery measuring 4 3 x 2 5 x 2 2 cm is again noted and is unchanged in appearance   Previously seen left iliac partially calcified metastatic lymph node measuring 1 cm in short axis is again noted   unchanged  No CT evidence for pancreatitis  Percutaneous cholecystostomy tube is again noted  EKG, Pathology, and Other Studies: I have personally reviewed pertinent reports  Counseling / Coordination of Care  Total floor / unit time spent today 40 minutes  Greater than 50% of total time was spent with the patient and / or family counseling and / or coordination of care  A description of the counseling / coordination of care: etiology of condition and treatment options      Jeffery Prakash PA-C

## 2019-09-26 NOTE — PLAN OF CARE
Problem: OCCUPATIONAL THERAPY ADULT  Goal: Performs self-care activities at highest level of function for planned discharge setting  See evaluation for individualized goals  Description  Treatment Interventions: ADL retraining, Functional transfer training, UE strengthening/ROM, Endurance training, Patient/family training, Equipment evaluation/education, Activityengagement          See flowsheet documentation for full assessment, interventions and recommendations  Note:   Limitation: Decreased ADL status, Decreased UE strength, Decreased endurance, Decreased self-care trans, Decreased high-level ADLs     Assessment: Pt is a [de-identified] y o  male seen for OT evaluation s/p admit to [facility] on 9/25/2019 w/ Acute pancreatitis without infection or necrosis  Comorbidities affecting pt's functional performance at time of assessment include: DM, HTN and weakness, necrosis, GERD, CAD, CA, DJD  Personal factors affecting pt at time of IE include:difficulty performing ADLS, difficulty performing IADLS , limited insight into deficits, decreased initiation and engagement  and health management   Prior to admission, pt was (A) with ADLs and IADLs with use of RW during functional mobility  Upon evaluation: Pt requires (S)-max (A) with use of RW during functional mobility 2* the following deficits impacting occupational performance: weakness, decreased strength, decreased balance, decreased tolerance, impaired initiation, impaired problem solving and decreased safety awareness  Pt to benefit from continued skilled OT tx while in the hospital to address deficits as defined above and maximize level of functional independence w ADL's and functional mobility  Occupational Performance areas to address include: grooming, bathing/shower, toilet hygiene, dressing, functional mobility, community mobility and clothing management  From OT standpoint, recommendation at time of d/c would be return to SNF for continued STR prior to return home  OT Discharge Recommendation: Short Term Rehab

## 2019-09-26 NOTE — ASSESSMENT & PLAN NOTE
· Improving  · The lipase level is now normal  · Advanced the patient's diet to a solid food diet  · NSS IV fluids  · Serial abdominal examinations

## 2019-09-26 NOTE — ED NOTES
BONILLA drain emptied for 20mL bile colored drainage  Continuing to drain for same        Ashish Womack RN  09/25/19 [id number]

## 2019-09-26 NOTE — SOCIAL WORK
Pt was admitted from [facility] where he recently went for STR  PT was at [facility] hospital from 9/15/19-9/18/19 and was dc'd to [facility] for rehab  CM spoke with Ne Daily in admissions dept at SNF who stated pt is not a bedhold but they are willing to re accept pt (CM will just need to obtain authorization with [facility] [employer] for SNF/STR)  Pt was at [facility] for SILVERIO CKD stage III, biliary sludge and s/p percutaneous cholecystostomy on 9/6/19, neuroendocrine tumor, generalized weakness  Pt now being admitted with acute pancreatitis-obstructed cholecystomy tube; neuroendocrine cancer with mets to liver  Admissions are related to pt cholecystitis/ s/p percutaneous cholecystostomy that was blocked and was flushed in the ER

## 2019-09-26 NOTE — OCCUPATIONAL THERAPY NOTE
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)    Urinary retention    Past Medical History  Past Medical History:   Diagnosis Date    Acute pancreatitis without infection or necrosis 9/26/2019    BPH (benign prostatic hyperplasia)     Cancer (HCC)     liver cancer    Cardiac disease     Coronary artery disease     [facility] ([facility] )     DJD (degenerative joint disease)     Gait disturbance     Generalized weakness     GERD (gastroesophageal reflux disease)     Gout     History of transfusion     Hyperlipidemia     Hypertension     Pressure injury of skin     Renal disorder     Type 2 diabetes 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, with long-term current use of insulin ([facility] ) 1/23/2019     Past Surgical History  Past Surgical History:   Procedure Laterality Date    CORONARY ANGIOPLASTY WITH STENT PLACEMENT      IR IMAGE GUIDED BIOPSY/ASPIRATION LIVER  1/24/2019    IR TUBE PLACEMENT ROQUE  9/6/2019 09/26/19 0851   Note Type   Note type Eval/Treat   Restrictions/Precautions   Weight Bearing Precautions Per Order No   Other Precautions Contact/isolation; Bed Alarm; Chair Alarm;Multiple lines;Telemetry; Fall Risk   Pain Assessment   Pain Assessment No/denies pain   Home Living   Type of [address] One level;Performs ADLs on one level; Able to live on main level with bedroom/bathroom;Stairs to enter with rails; Other (Comment)  (2 BETH c HR)   Bathroom Shower/Tub Tub/shower unit   Bathroom Toilet Standard [address] [place] [address] chair   [address] [place]   Additional Comments pt at SNF at this time for rehab; recently arrived to complete rehab   Prior Function   Level of [place] Needs assistance with ADLs and functional mobility; Needs assistance with IADLs   Lives With Facility staff   Receives Help From Personal care attendant   ADL Assistance Needs assistance   IADLs Needs assistance   Comments pt utilizes RW at baseline during functional mobility   Psychosocial   Psychosocial (WDL) WDL   Subjective   Subjective "I don't know why they brought me here"   ADL   Where Assessed Edge of bed   LB Dressing Assistance 2  Maximal Assistance   LB Dressing Deficit Don/doff R sock; Don/doff L sock   Additional Comments unable to perform LB dressing; will benefit from long handled equipment   Bed Mobility   Supine to Sit 5  Supervision   Additional items Bedrails; Increased time required   Sit to Supine   (seated in chair at end of session)   Additional Comments pt on RA during session; pt with no complaints of SOB; WFL SpO2   Transfers   Sit to Stand 5  Supervision   Additional items Verbal cues  (RW)   Stand to Sit 5  Supervision   Additional items Verbal cues  (RW)   Stand pivot 5  Supervision   Additional items Verbal cues  (RW)   Additional Comments pt performed functional transfers with use of RW this date; no LOB or instability; slow to perform tasks   Functional Mobility   Functional Mobility 5  Supervision   Additional Comments pt performed functional mobility with use of RW; limited functional endurance during session with fatigue   Additional items Rolling walker   Balance   Static Sitting Good   Dynamic Sitting Good   Static Standing Fair +   Dynamic Standing Fair +   Ambulatory Fair   Activity Tolerance   Activity Tolerance Patient limited by fatigue   RUE Assessment   RUE Assessment X  (4-/5 grossly; WFL [facility])   LUE Assessment   LUE Assessment X  (4-/5 grossly; [facility])   Hand Function   Gross Motor Coordination Functional   Fine Motor Coordination Functional   Sensation   Light Touch No apparent deficits   Sharp/Dull No apparent deficits   Perception   Inattention/Neglect Appears intact   Cognition   Overall Cognitive Status Impaired   Arousal/Participation Alert   Attention Within functional limits   Orientation Level Oriented to person;Oriented to place;Oriented to time   Memory Decreased long term memory;Decreased short term memory   Following Commands Follows one step commands without difficulty   Comments pt is hard of hearing    Assessment   Limitation Decreased ADL status; Decreased UE strength;Decreased endurance;Decreased self-care trans;Decreased high-level ADLs   Assessment Pt is a [de-identified] y o  male seen for OT evaluation s/p admit to [facility] on 9/25/2019 w/ Acute pancreatitis without infection or necrosis  Comorbidities affecting pt's functional performance at time of assessment include: DM, HTN and weakness, necrosis, GERD, CAD, CA, DJD  Personal factors affecting pt at time of IE include:difficulty performing ADLS, difficulty performing IADLS , limited insight into deficits, decreased initiation and engagement  and health management   Prior to admission, pt was (A) with ADLs and IADLs with use of RW during functional mobility  Upon evaluation: Pt requires (S)-max (A) with use of RW during functional mobility 2* the following deficits impacting occupational performance: weakness, decreased strength, decreased balance, decreased tolerance, impaired initiation, impaired problem solving and decreased safety awareness  Pt to benefit from continued skilled OT tx while in the hospital to address deficits as defined above and maximize level of functional independence w ADL's and functional mobility  Occupational Performance areas to address include: grooming, bathing/shower, toilet hygiene, dressing, functional mobility, community mobility and clothing management   From OT standpoint, recommendation at time of d/c would be return to SNF for continued STR prior to return home  Goals   Patient Goals to go home   Short Term Goal  pt will perform UE strengthening exercises    Long Term Goal #1 pt will demonstrate UB bathing and grooming tasks seated in chair at min (A) level    Long Term Goal #2 pt will increase independence with use of RW during functional mobility at mod (I) level   Long Term Goal pt will increase independence with LB dressing tasks with or without use of AE at (S) level    Plan   Treatment Interventions ADL retraining;Functional transfer training;UE strengthening/ROM; Endurance training;Patient/family training;Equipment evaluation/education; Activityengagement   Goal Expiration Date 10/10/19   OT Frequency 3-5x/wk   Recommendation   OT Discharge Recommendation Short Term Rehab   Barthel Index   Feeding 10   Bathing 0   Grooming Score 0   Dressing Score 5   Bladder Score 0   Bowels Score 5   Toilet Use Score 5   Transfers (Bed/Chair) Score 10   Mobility (Level Surface) Score 10   Stairs Score 0   Barthel Index Score 45      Pt left seated in chair at end of session; all needs within reach; all lines intact; scds connected and turned on  Pt will benefit from continued OT services in order to maximize (I) c ADL performance, FM c RW, and improve overall endurance/strength required to complete functional tasks in preparation for d/c

## 2019-09-26 NOTE — PHYSICAL THERAPY NOTE
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)    Acute renal failure superimposed on stage 4 chronic kidney disease (HCC)    Chronic indwelling Haile catheter    Moderate protein-calorie malnutrition [facility])    Urinary retention       Past Medical History  Past Medical History:   Diagnosis Date    Acute pancreatitis without infection or necrosis 9/26/2019    BPH (benign prostatic hyperplasia)     Cancer (HCC)     liver cancer    Cardiac disease     Coronary artery disease     [facility] ([facility] )     DJD (degenerative joint disease)     Gait disturbance     Generalized weakness     GERD (gastroesophageal reflux disease)     Gout     History of transfusion     Hyperlipidemia     Hypertension     Pressure injury of skin     Renal disorder     Type 2 diabetes 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, with long-term current use of insulin ([facility] [facility] ) 1/23/2019        Past Surgical History  Past Surgical History:   Procedure Laterality Date    CORONARY ANGIOPLASTY WITH STENT PLACEMENT      IR IMAGE GUIDED BIOPSY/ASPIRATION LIVER  1/24/2019    IR TUBE PLACEMENT ROQUE  9/6/2019 09/26/19 0931   Note Type   Note type Eval/Treat   Pain Assessment   Pain Assessment No/denies pain   Home Living   Type of [address] [facility] One level; Able to live on main level with bedroom/bathroom; Performs ADLs on one level;Stairs to enter with rails  (2 BETH with HR)   Bathroom Shower/Tub Tub/shower unit   Bathroom Toilet Standard   Bathroom Equipment Shower chair   [address] [place]   Additional Comments pt at [facility] prior to coming to [address] [place] [address] for rehab   Prior Function   Level of [place] Needs assistance with ADLs and functional mobility  (ambulation with RW)   Lives With Facility staff   Receives Help From Personal care attendant   ADL Assistance Needs assistance   IADLs Needs assistance   Comments Pt was (I) prior to short term rehab   Restrictions/Precautions   Weight Bearing Precautions Per Order No   Other Precautions Contact/isolation; Chair Alarm; Bed Alarm;Multiple lines;Telemetry; Fall Risk   General   Family/Caregiver Present No   Cognition   Overall Cognitive Status Impaired   Arousal/Participation Alert   Orientation Level Oriented to person;Oriented to place;Oriented to time   Following Commands Follows one step commands without difficulty   Comments pt is hard of hearing   RLE Assessment   RLE Assessment WFL  (4/5)   LLE Assessment   LLE Assessment WFL  (4/5)   Coordination   Sensation WFL   Light Touch   RLE Light Touch Grossly intact   LLE Light Touch Grossly intact   Bed Mobility   Supine to Sit 5  Supervision   Additional items Bedrails; Increased time required;Verbal cues   Sit to Supine   (seated in chair at bedside with bell in reach alarm on)   Additional Comments pt on room air with spO2 97% HR 77 at rest and after ambulation 99% HR 84   Transfers   Sit to Stand 5  Supervision   Additional items Bedrails; Increased time required;Verbal cues  (with RW)   Stand to Sit 5  Supervision   Additional items Verbal cues; Increased time required  (with RW)   Stand pivot 5  Supervision   Additional items Increased time required;Verbal cues  (with RW)   Additional Comments Pt with forward flexed posture and decreased step length with ambulation with RW  Limited ambulation due to weakness and fatigue   Ambulation/Elevation   Gait pattern Foward flexed; Short stride   Gait Assistance 5  Supervision   Assistive Device Rolling walker   Distance 80ft with RW (S) no LOB but altered gait pattern and posture present with fatigue limiting ambulation   Balance   Static Sitting Good   Dynamic Sitting Good   Static Standing Fair +  (with RW)   Dynamic Standing Fair  (with RW)   Ambulatory Fair  (with RW)   Endurance Deficit   Endurance Deficit Yes   Endurance Deficit Description limited ambulation tolerance due to fatigue and weakness with increased fall risk   Activity Tolerance   Activity Tolerance Patient limited by fatigue   Assessment   Prognosis Good   Problem List Decreased strength;Decreased endurance; Impaired balance;Decreased mobility; Impaired hearing;Decreased safety awareness   Assessment Pt is an [de-identified]year old male with complex PMH including CA, CAD, DM, HTN weakness and BONILLA drain for percutaneous cholecystectomy  Pt seen for high complexity PT evaluation presenting with decreased LE strength decreased balance endurance forward flexed posture decreased mobility transfers and ambulation requiring increased time to complete task and verbal cues for safety  Pt performing all bed mobility transfers and ambulation at a (S) level with RW however ambulation limited to 80 ft which is less than household distance with mild unsteadiness and increased risk for falls  Pt is in need of continued activity in PT to improve all impairments and functional deficits with goal of return to (I) LOF  Anticipate pt will have to return to SNF to complete rehab prior to returning home   Goals   Patient Goals To feel better and return home   LTG Expiration Date 10/10/19   Long Term Goal #1 Increase bilateral LE strength by 1/2 muscle grade to improve all bed mobility to (I) and transfers to modified (I) with RW   Long Term Goal #2 Improve standing and ambulatory balance to fair+ to good with RW to improve ambulation to 300ft with RW (S) and to improve step negotiation to 4 steps with HR (S) no LOB   Plan   Treatment/Interventions Functional transfer training;LE strengthening/ROM; Elevations; Therapeutic exercise; Endurance training;Patient/family training;Bed mobility;Gait training   PT Frequency   (3-5x/wk)   Recommendation   Recommendation Short-term skilled PT   PT - OK to Discharge   (to next level of care when medically stable for discharge)     Pt with SCD's on when PT entered room  SCD's reapplied and turned on with pt seated in chair at bedside with call bell in reach and chair alarm on

## 2019-09-26 NOTE — ASSESSMENT & PLAN NOTE
Lab Results   Component Value Date    HGBA1C 8 1 (H) 06/14/2019       No results for input(s): POCGLU in the last 72 hours      Blood Sugar Average: Last 72 hrs:     · Continue levemir 10 Units SQ QHS  · Hypoglycemia protocol  · Follow the blood glucose trend

## 2019-09-26 NOTE — ED NOTES
BONILLA drain emptied for 50mL bile colored drainage  Continuing to drain for same        Raina Cohen RN  09/26/19 0658

## 2019-09-26 NOTE — ASSESSMENT & PLAN NOTE
· Acute kidney injury was present on admission  · Baseline serum creatinine of 1 4-1 5 mg/dl  · Continue NSS IV fluids  · Avoid all nephrotoxic agents  · Maintain chronic Haile catheter for chronic urinary retention  · The patient was seen in consultation by Nephrology  · Serial laboratory testing to monitor the patient's renal function and electrolytes

## 2019-09-26 NOTE — ASSESSMENT & PLAN NOTE
· Initially presented to the emergency department with an obstructed cholecystostomy tube  · The patient was seen in consultation by General Surgery  · The cholecystostomy tube obstruction is now resolved and is functioning appropriately

## 2019-09-26 NOTE — ED NOTES
Spoke to [employer] from [facility]  Provided with update and admission status  States she is to inform significant other of admission status        Namrata Rubalcava RN  09/26/19 7723

## 2019-09-26 NOTE — H&P
H&P Exam - Jessy Mcgraw [de-identified] y o  male MRN: [id number]    Unit/Bed#: 413-01 Encounter: [id number]      Assessment/Plan       * Acute pancreatitis without infection or necrosis  Assessment & Plan  Patient initially sent to ER from [address] after developing acute epigastric pain (which resolved with 1 tablet percocet) and obstructed cholecystostomy tube  Cholecystostomy tube flushed in ER and patent  Draining large volume of dark bile  Problem resolved  ER evaluation revealed lipase 1155, was 138 last week  CT shows known carcinoma with hepatobiliary metastases, but pancreas unremarkable  Vital signs unremarkable, WBC normal   Abdomen nontender on exam   Initial lactate mildly elevated, but normalized with IV fluids  Will admit for IV crystalloids, NPO pending clinical course and surgical evaluation  Will hold antibiotics at this time, as no apparent infection  Consulted General Surgery, GI, and Nutrition  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 [facility])  Assessment & Plan  Chronic progressive condition  Continue routine follow up with Oncology  Consult GI and General Surgery regarding acute complications of disease  Moderate protein-calorie malnutrition (HCC)  Assessment & Plan  Malnutrition Findings:        Malnutrition related to prolonged inadequate intake as evidenced by subcutaneous fat loss in bilateral upper extremities and orbital/cheek region, severe temporal wasting, clavicle/sternum visible, sunken orbitals, inability to meet nutrient needs by mouth  BMI Findings: Body mass index is 22 79 kg/m²  Consult dietitian  Chronic indwelling Haile catheter  Assessment & Plan  Secondary to urinary retention  Maintain Haile catheter  Acute renal failure superimposed on stage 4 chronic kidney disease ([facility] [address] )  Assessment & Plan  Patient with baseline creatinine approximately 2, worse over past 2 months    Creatinine 2 62 last week when discharged from hospital, 3 06 at time of presentation  Patient received IV fluid bolus in ER  Will continue crystalloids but switch to D5NS based on patient NPO for acute pancreatitis and diabetic  Will not add K at this time based on current level 4 4 and renal failure  Consult nephrology  Type 2 diabetes 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, with long-term current use of insulin (HCC)  Assessment & Plan  Lab Results   Component Value Date    HGBA1C 8 1 (H) 06/14/2019       No results for input(s): POCGLU in the last 72 hours  Blood Sugar Average: Last 72 hrs:       Diabetes uncontrolled as evidenced by elevated A1c  Start basal/bolus insulin with blood glucose AC and HS  Cholecystostomy tube dysfunctionresolved as of 9/26/2019  Assessment & Plan  Patient initially sent to ER from [address] after developing acute epigastric pain (which resolved with 1 tablet percocet) and obstructed cholecystostomy tube  Cholecystostomy tube flushed in ER and patent  Draining large volume of dark bile  Problem resolved  ER evaluation revealed lipase 1155, was 138 last week  CT shows known carcinoma with hepatobiliary metastases, but pancreas unremarkable  Vital signs unremarkable, WBC normal   Abdomen nontender on exam   Initial lactate mildly elevated, but normalized with IV fluids  Will admit for IV crystalloids, NPO pending clinical course and surgical evaluation  Will hold antibiotics at this time, as no apparent infection  Consulted General Surgery, GI, and Nutrition  History of Present Illness   HPI:  Juancho Paredes is a [de-identified] y o  male who was sent to ER from [address] after developing acute epigastric pain (which resolved with 1 tab of Percocet) obstructed cholecystostomy tube  Percutaneous cholecystostomy was placed September 6 for cholecystitis due to biliary sludging with plan to complete elective cholecystectomy at a later date      PCP: Oumar De La Vega,     Review of Systems Constitutional: Positive for appetite change and fatigue  Negative for chills, diaphoresis and fever  Respiratory: Negative for cough, shortness of breath and wheezing  Cardiovascular: Negative for chest pain  Gastrointestinal: Positive for abdominal pain  Negative for blood in stool, constipation, diarrhea, nausea and vomiting  Neurological: Positive for weakness  All other systems reviewed and are negative  Historical Information   Past Medical History:   Diagnosis Date    Acute pancreatitis without infection or necrosis 9/26/2019    BPH (benign prostatic hyperplasia)     Cancer (HCC)     liver cancer    Cardiac disease     Coronary artery disease     Diabetes mellitus (HCC)     DJD (degenerative joint disease)     Gait disturbance     Generalized weakness     GERD (gastroesophageal reflux disease)     Gout     History of transfusion     Hyperlipidemia     Hypertension     Pressure injury of skin     Renal disorder     Type 2 diabetes 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, with long-term current use of insulin ([facility] [address] ) 1/23/2019     Past Surgical History:   Procedure Laterality Date    CORONARY ANGIOPLASTY WITH STENT PLACEMENT      IR IMAGE GUIDED BIOPSY/ASPIRATION LIVER  1/24/2019    IR TUBE PLACEMENT ROQUE  9/6/2019     Social History   Social History     Substance and Sexual Activity   Alcohol Use Never    Frequency: Never     Social History     Substance and Sexual Activity   Drug Use Never     Social History     Tobacco Use   Smoking Status Never Smoker   Smokeless Tobacco Never Used     Family History: non-contributory    Meds/Allergies   PTA meds:   Prior to Admission Medications   Prescriptions Last Dose Informant Patient Reported?  Taking?   acetaminophen (TYLENOL) 325 mg tablet 9/24/2019 at Unknown time Outside Facility ([facility] Yes Yes   Sig: Take by mouth as needed   amLODIPine (NORVASC) 10 mg tablet 9/25/2019 at Unknown time  Yes Yes   Sig: Take 10 mg by mouth daily   aspirin 81 MG tablet 9/25/2019 at Unknown time Self Yes Yes   Sig: Take 81 mg by mouth daily   b complex-vitamin C-folic acid (NEPHROCAPS) 1 mg capsule 9/25/2019 at Unknown time  No Yes   Sig: Take 1 capsule by mouth daily with dinner   cholecalciferol (VITAMIN D3) 1,000 units tablet 9/25/2019 at Unknown time  Yes Yes   Sig: Take 1,000 Units by mouth daily   colchicine (COLCRYS) 0 6 mg tablet 9/25/2019 at Unknown time  Yes Yes   Sig: Take 0 6 mg by mouth daily   insulin detemir (LEVEMIR) 100 units/mL subcutaneous injection 9/25/2019 at Unknown time  Yes Yes   Sig: Inject 10 Units under the skin daily at bedtime   insulin lispro (HumaLOG) 100 units/mL injection 9/25/2019 at Unknown time  Yes Yes   Sig: Inject under the skin 3 (three) times a day before meals   methenamine hippurate (HIPREX) 1 g tablet 9/25/2019 at Unknown time  Yes Yes   Sig: Take 1 g by mouth 2 (two) times a day with meals   oxyCODONE-acetaminophen (PERCOCET) 5-325 mg per tablet 9/25/2019 at Unknown time Outside Facility ([address] [place] [address]) Yes Yes   Sig: Take 1 tablet by mouth every 4 (four) hours as needed for moderate pain   pantoprazole (PROTONIX) 40 mg tablet 9/25/2019 at Unknown time Self No Yes   Sig: Take 1 tablet (40 mg total) by mouth daily in the early morning   sodium bicarbonate 650 mg tablet 9/25/2019 at Unknown time  No Yes   Sig: Take 1 tablet (650 mg total) by mouth 2 (two) times daily after meals   tamsulosin (FLOMAX) 0 4 mg 9/25/2019 at Unknown time Self No Yes   Sig: Take 1 capsule (0 4 mg total) by mouth daily with dinner      Facility-Administered Medications: None     No Known Allergies    Objective   Vitals: Blood pressure 135/71, pulse 77, temperature 97 6 °F (36 4 °C), temperature source Oral, resp  rate 21, height 5' 8" (1 727 m), weight 68 kg (149 lb 14 6 oz), SpO2 99 %        Intake/Output Summary (Last 24 hours) at 9/26/2019 0807  Last data filed at 9/26/2019 0408  Gross per 24 hour   Intake    Output 360 ml   Net -360 ml       Invasive Devices     Peripheral Intravenous Line            Peripheral IV 09/25/19 Right Forearm less than 1 day          Drain            Urethral Catheter Latex 16 Fr  89 days    Closed/Suction Drain Right RUQ Bulb 10 2 Fr  19 days    Closed/Suction Drain Lateral RLQ Bulb less than 1 day                Physical Exam   Constitutional:   Chronically ill-appearing frail elderly male lying supine in hospital bed  Patient is somnolent briefly arousable to answer questions  HENT:   Head: Normocephalic and atraumatic  Right Ear: External ear normal    Left Ear: External ear normal    Nose: Nose normal    Eyes: Pupils are equal, round, and reactive to light  Conjunctivae and EOM are normal  Right eye exhibits no discharge  Left eye exhibits no discharge  No scleral icterus  Neck: No JVD present  No tracheal deviation present  No thyromegaly present  Cardiovascular: Normal rate, regular rhythm, normal heart sounds and intact distal pulses  Exam reveals no gallop and no friction rub  No murmur heard  Pulmonary/Chest: Effort normal and breath sounds normal  No stridor  No respiratory distress  He has no wheezes  He has no rales  He exhibits no tenderness  Abdominal: Soft  Bowel sounds are normal  He exhibits mass  He exhibits no distension  There is no tenderness  There is no rebound and no guarding  Patent BONILLA drain with large volume of dark bile draining freely from cholecystostomy  Palpable mass right of midline in epigastrium approx 3cm diameter consistent with known carcinoma  Genitourinary:   Genitourinary Comments: Haile catheter in place draining yellow urine  Musculoskeletal: He exhibits no edema, tenderness or deformity  Lymphadenopathy:     He has no cervical adenopathy  Neurological:   Patient extremely somnolent but briefly arousable  Skin: Skin is warm and dry  Capillary refill takes less than 2 seconds  No rash noted  He is not diaphoretic  No erythema  No pallor  Psychiatric:   Unable to fully assess due to patient's somnolence  Briefly arousable  Nursing note and vitals reviewed  Lab Results: I have personally reviewed pertinent reports  Imaging: I have personally reviewed pertinent reports  and I have personally reviewed pertinent films in PACS  EKG, Pathology, and Other Studies: I have personally reviewed pertinent reports  Code Status: Level 1 - Full Code  Advance Directive and Living Will:      Power of :    POLST:      Counseling / Coordination of Care  Total floor / unit time spent today 55 minutes  Greater than 50% of total time was spent with the patient and / or family counseling and / or coordination of care  A description of the counseling / coordination of care: Greater than 30 minutes spent completing orders/documentation and face-to-face with this patient discussing diagnosis, prognosis, and plan of care

## 2019-09-26 NOTE — PLAN OF CARE
Problem: Potential for Falls  Goal: Patient will remain free of falls  Description  INTERVENTIONS:  - Assess patient frequently for physical needs  -  Identify cognitive and physical deficits and behaviors that affect risk of falls  -  [place] fall precautions as indicated by assessment   - Educate patient/family on patient safety including physical limitations  - Instruct patient to call for assistance with activity based on assessment  - Modify environment to reduce risk of injury  - Consider OT/PT consult to assist with strengthening/mobility  Outcome: Progressing     Problem: Prexisting or High Potential for Compromised Skin Integrity  Goal: Skin integrity is maintained or improved  Description  INTERVENTIONS:  - Identify patients at risk for skin breakdown  - Assess and monitor skin integrity  - Assess and monitor nutrition and hydration status  - Monitor labs   - Assess for incontinence   - Turn and reposition patient  - Assist with mobility/ambulation  - Relieve pressure over bony prominences  - Avoid friction and shearing  - Provide appropriate hygiene as needed including keeping skin clean and dry  - Evaluate need for skin moisturizer/barrier cream  - Collaborate with interdisciplinary team   - Patient/family teaching  - Consider wound care consult   Outcome: Progressing     Problem: Nutrition/Hydration-ADULT  Goal: Nutrient/Hydration intake appropriate for improving, restoring or maintaining nutritional needs  Description  Monitor and assess patient's nutrition/hydration status for malnutrition  Collaborate with interdisciplinary team and initiate plan and interventions as ordered  Monitor patient's weight and dietary intake as ordered or per policy  Utilize nutrition screening tool and intervene as necessary  Determine patient's food preferences and provide high-protein, high-caloric foods as appropriate       INTERVENTIONS:  - Monitor oral intake, urinary output, labs, and treatment plans  - Assess nutrition and hydration status and recommend course of action  - Evaluate amount of meals eaten  - Assist patient with eating if necessary   - Allow adequate time for meals  - Recommend/ encourage appropriate diets, oral nutritional supplements, and vitamin/mineral supplements  - Order, calculate, and assess calorie counts as needed  - Recommend, monitor, and adjust tube feedings and TPN/PPN based on assessed needs  - Assess need for intravenous fluids  - Provide specific nutrition/hydration education as appropriate  - Include patient/family/caregiver in decisions related to nutrition  Outcome: Progressing     Problem: PAIN - ADULT  Goal: Verbalizes/displays adequate comfort level or baseline comfort level  Description  Interventions:  - Encourage patient to monitor pain and request assistance  - Assess pain using appropriate pain scale  - Administer analgesics based on type and severity of pain and evaluate response  - Implement non-pharmacological measures as appropriate and evaluate response  - Consider cultural and social influences on pain and pain management  - Notify physician/advanced practitioner if interventions unsuccessful or patient reports new pain  Outcome: Progressing     Problem: INFECTION - ADULT  Goal: Absence or prevention of progression during hospitalization  Description  INTERVENTIONS:  - Assess and monitor for signs and symptoms of infection  - Monitor lab/diagnostic results  - Monitor all insertion sites, i e  indwelling lines, tubes, and drains  - [place] appropriate cooling/warming therapies per order  - Administer medications as ordered  - Instruct and encourage patient and family to use good hand hygiene technique  - Identify and instruct in appropriate isolation precautions for identified infection/condition   Outcome: Progressing     Problem: SAFETY ADULT  Goal: Maintain or return to baseline ADL function  Description  INTERVENTIONS:  -  Assess patient's ability to carry out ADLs; assess patient's baseline for ADL function and identify physical deficits which impact ability to perform ADLs (bathing, care of mouth/teeth, toileting, grooming, dressing, etc )  - Assess/evaluate cause of self-care deficits   - Assess range of motion  - Assess patient's mobility; develop plan if impaired  - Assess patient's need for assistive devices and provide as appropriate  - Encourage maximum independence but intervene and supervise when necessary  - Involve family in performance of ADLs  - Assess for home care needs following discharge   - Consider OT consult to assist with ADL evaluation and planning for discharge  - Provide patient education as appropriate  Outcome: Progressing  Goal: Maintain or return mobility status to optimal level  Description  INTERVENTIONS:  - Assess patient's baseline mobility status (ambulation, transfers, stairs, etc )    - Identify cognitive and physical deficits and behaviors that affect mobility  - Identify mobility aids required to assist with transfers and/or ambulation (gait belt, sit-to-stand, lift, walker, cane, etc )  - [place] fall precautions as indicated by assessment  - Record patient progress and toleration of activity level on Mobility SBAR; progress patient to next Phase/Stage  - Instruct patient to call for assistance with activity based on assessment  - Consider rehabilitation consult to assist with strengthening/weightbearing, etc   Outcome: Progressing     Problem: DISCHARGE PLANNING  Goal: Discharge to home or other facility with appropriate resources  Description  INTERVENTIONS:  - Identify barriers to discharge w/patient and caregiver  - Arrange for needed discharge resources and transportation as appropriate  - Identify discharge learning needs (meds, wound care, etc )  - Arrange for interpretive services to assist at discharge as needed  - Refer to Case Management Department for coordinating discharge planning if the patient needs post-hospital services based on physician/advanced practitioner order or complex needs related to functional status, cognitive ability, or social support system  Outcome: Progressing

## 2019-09-26 NOTE — UTILIZATION REVIEW
Initial Clinical Review    Admission: Date/Time/Statement: Inpatient Admission Orders (From admission, onward)     Ordered        09/26/19 0230  Inpatient Admission (expected length of stay for this patient Order details is greater than two midnights)  Once                   Orders Placed This Encounter   Procedures    Inpatient Admission (expected length of stay for this patient Order details is greater than two midnights)     Standing Status:   Standing     Number of Occurrences:   1     Order Specific Question:   Admitting Physician     Answer:   Manish Sims [[id number]]     Order Specific Question:   Level of Care     Answer:   Med Surg [16]     Order Specific Question:   Estimated length of stay     Answer:   More than 2 Midnights     Order Specific Question:   Certification     Answer:   I certify that inpatient services are medically necessary for this patient for a duration of greater than two midnights  See H&P and MD Progress Notes for additional information about the patient's course of treatment  ED Arrival Information     Expected Arrival Acuity Means of Arrival Escorted By Service Admission Type    - 9/25/2019 22:37 Urgent [address] [place] [address] Drive Ambulance General Medicine Urgent    Arrival Complaint    drain issue        Chief Complaint   Patient presents with    Abdominal Pain     Pt coming from [address] reports lower right quadrant pain after lunch today which has resolved 1 hr ago after taking pain medication  Also, pt has ofelia drain on RUQ not draining or able to flush per staff and was given instruction to send pt to ED per evaluation today  denies fevers/n/v     Assessment/Plan:   [de-identified]  Y O male  Presents to ED  From  SNF  Via  EMS with acute  Epigastric pain and obstructed cholecystostomy tube  Tube  Was placed  On  9/6  For cholecystitis  D/T biliary sludging with  Elective  Cholecystectomy  At a  Later date  Tube  Flushed  In ED  And draining  lg  am'ts dark bile  Labs  Revealed   Elevated   Lipase,  Much  Higher than 1 week   PTA  PMH  Is  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,  Chronic  Haile,  DM2,   And   CKD  Stage 4  ADMIT  IP   MED SURG  LOC  WITH   Acute  Pancreatitis  And plan  Is  Gi and  Surgery  Consults,  Monitor  Labs,  IVF  And  Pain control  PE:   Abdominal: Soft  Bowel sounds are normal  He exhibits mass  He exhibits no distension  There is no tenderness  There is no rebound and no guarding  Patent BONILLA drain with large volume of dark bile draining freely from cholecystostomy  Palpable mass right of midline in epigastrium approx 3cm diameter consistent with known carcinoma           ED Triage Vitals [09/25/19 2243]   Temperature Pulse Respirations Blood Pressure SpO2   99 °F (37 2 °C) 82 18 129/71 97 %      Temp Source Heart Rate Source Patient Position - Orthostatic VS BP Location FiO2 (%)   Temporal Monitor Lying Left arm --      Pain Score       No Pain        Wt Readings from Last 1 Encounters:   09/26/19 68 kg (149 lb 14 6 oz)     Additional Vital Signs:   09/26/19 08:23:24    79  18  129/58  82  98 %  None (Room air)  Lying   09/26/19 0440              None (Room air)     09/26/19 04:08:39  97 6 °F (36 4 °C)  77  21  135/71  92  99 %  None (Room air)  Lying   09/26/19 0300    70  17  134/69    97 %       09/26/19 0030    75  16  130/71    98 %  None (Room air)  Lying   09/25/19 2347              None (Room air)     09/25/19 2330    82  22  137/68    98 %  None (Room air)     09/25/19 2300    81  22  118/62    98 %  None (Room air)     09/25/19 2243  99 °F (37 2 °C)  82  18  129/71    97 %  None (Room air)  Lying         Pertinent Labs/Diagnostic Test Results:   Results from last 7 days   Lab Units 09/26/19  0500 09/25/19  2317   WBC Thousand/uL 8 57 9 57   HEMOGLOBIN g/dL 10 1* 10 3*   HEMATOCRIT % 31 0* 31 4*   PLATELETS Thousands/uL 209 216   NEUTROS ABS Thousands/µL 6 00 6 81         Results from last 7 days   Lab Units 09/26/19  0500 09/25/19  2317   SODIUM mmol/L 140 135*   POTASSIUM mmol/L 4 5 4 4   CHLORIDE mmol/L 106 103   CO2 mmol/L 22 21   ANION GAP mmol/L 12 11   BUN mg/dL 46* 50*   CREATININE mg/dL 2 86* 3 06*   EGFR ml/min/1 73sq m 20 18   CALCIUM mg/dL 8 4 8 6   MAGNESIUM mg/dL 1 9  --    PHOSPHORUS mg/dL 4 4*  --      Results from last 7 days   Lab Units 09/26/19  0500 09/25/19  2317   AST U/L 22 31   ALT U/L 31 45   ALK PHOS U/L 212* 239*   TOTAL PROTEIN g/dL 6 6 6 8   ALBUMIN g/dL 2 6* 2 8*   TOTAL BILIRUBIN mg/dL 0 40 0 30     Results from last 7 days   Lab Units 09/26/19  0839   POC GLUCOSE mg/dl 134     Results from last 7 days   Lab Units 09/26/19  0500 09/25/19  2317   GLUCOSE RANDOM mg/dL 138 253*             BETA-HYDROXYBUTYRATE   Date Value Ref Range Status   02/05/2019 0 11 0 02 - 0 27 mmol/L Final            Results from last 7 days   Lab Units 09/25/19  2317   TROPONIN I ng/mL <0 02         Results from last 7 days   Lab Units 09/26/19  0500   PROTIME seconds 13 3   INR  1 01             Results from last 7 days   Lab Units 09/26/19  0302 09/25/19  2317   LACTIC ACID mmol/L 1 9 2 5*           Results from last 7 days   Lab Units 09/26/19  0500 09/25/19  2317   LIPASE u/L 676* 1,155*         Ct  Abd/pelvis  (  9/26)      Previously seen partially calcified mass over the mesentery measuring 4 3 x 2 5 x 2 2 cm is again noted and is unchanged in appearance   Previously seen left iliac partially calcified metastatic lymph node measuring 1 cm in short axis is again noted   unchanged  No CT evidence for pancreatitis  Percutaneous cholecystostomy tube is again noted      CXR  ( 9/26)    NAD    ED Treatment:   Medication Administration from 09/25/2019 2237 to 09/26/2019 0352       Date/Time Order Dose Route Action Comments     09/26/2019 0024 sodium chloride 0 9 % bolus 1,000 mL 1,000 mL Intravenous New Bag      09/26/2019 0245 sodium chloride 0 9 % infusion 150 mL/hr Intravenous New Bag Present on Admission:   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   Acute renal failure superimposed on stage 4 chronic kidney disease (HCC)   Moderate protein-calorie malnutrition (HCC)   (Resolved) Cholecystostomy tube dysfunction   Acute pancreatitis without infection or necrosis      Admitting Diagnosis: Acute pancreatitis [K85 90]  Abdominal pain [R10 9]  Anemia [D64 9]  SILVERIO (acute kidney injury) ([facility] ) [N17 9]  Cholecystostomy care ([place] [address] ) [Z43 4]  Age/Sex: [de-identified] y o  male  Admission Orders:    Current Facility-Administered Medications:  aluminum-magnesium hydroxide-simethicone 30 mL Oral Q6H PRN   amLODIPine 10 mg Oral Daily   aspirin 81 mg Oral Daily   b complex-vitamin C-folic acid 1 capsule Oral Daily With Dinner   cholecalciferol 1,000 Units Oral Daily   colchicine 0 6 mg Oral Daily   dextrose 5 % and sodium chloride 0 9 % 100 mL/hr Intravenous Continuous   docusate sodium 100 mg Oral BID PRN   heparin (porcine) 5,000 Units Subcutaneous Q8H [address]   insulin detemir 10 Units Subcutaneous HS   insulin lispro 1-5 Units Subcutaneous TID AC   insulin lispro 1-5 Units Subcutaneous HS   methenamine hippurate 1 g Oral BID With Meals   ondansetron 4 mg Intravenous Q6H PRN   pantoprazole 40 mg Oral Early Morning   sodium bicarbonate 650 mg Oral BID after meals   tamsulosin 0 4 mg Oral Daily With Dinner     NPO  IP CONSULT TO CASE MANAGEMENT  IP CONSULT TO NEPHROLOGY  IP CONSULT TO ACUTE CARE SURGERY  IP CONSULT TO GASTROENTEROLOGY  IP CONSULT TO [facility] [employer] Utilization Review Department  Phone: [phone]; Fax [phone]  [email or web address]  ATTENTION: Please call with any questions or concerns to [phone]  and carefully listen to the prompts so that you are directed to the right person  Send all requests for admission clinical reviews, approved or denied determinations and any other requests to fax [phone]   All voicemails are confidential

## 2019-09-26 NOTE — ASSESSMENT & PLAN NOTE
· Outpatient follow-up with Dr Marnie Haddad (Hematology/Oncology) for lanreotide injections every 4 weeks

## 2019-09-26 NOTE — ED PROVIDER NOTES
History  Chief Complaint   Patient presents with    Abdominal Pain     Pt coming from [place] [address] home reports lower right quadrant pain after lunch today which has resolved 1 hr ago after taking pain medication  Also, pt has ofelia drain on RUQ not draining or able to flush per staff and was given instruction to send pt to ED per evaluation today  denies fevers/n/v     48-year-old male with several admissions this month for evaluation of abdominal pain  He ultimately had cholecystitis with evidence of sludge in his gallbladder and underwent percutaneous cholecystostomy on 9/6/2019  Plan is to have a  cholecystectomy in the future  Patient has a known neuroendocrine tumor which is metastatic to his liver any is followed by Dr Terrell Coleman of Oncology and undergoes monthly chemo injections he skipped his September injection due to being hospitalized  Presents by ambulance from [facility] due to intermittent abdominal pain  caregivers are concerned that there is no output from his cholecystostomy tube  Patient states that he ate some chicken salad for lunch and then developed a gassy type of abdominal pain which has since resolved  He was given a Percocet tablet at 2119  Denies having recurrent abdominal pains he had no pain yesterday he is denying any fever chills he denies lightheadedness he does feel generally weak which is a chronic problem for him  Patient does not feel he is abdomen is bloated he is denying nausea or vomiting he has had some slight loose stools he has a chronic indwelling Haile for urinary retention  No chest pain or sob  Prior to Admission Medications   Prescriptions Last Dose Informant Patient Reported?  Taking?   acetaminophen (TYLENOL) 325 mg tablet 9/24/2019 at Unknown time Outside Facility [facility]) Yes Yes   Sig: Take by mouth as needed   amLODIPine (NORVASC) 10 mg tablet 9/25/2019 at Unknown time  Yes Yes   Sig: Take 10 mg by mouth daily   aspirin 81 MG tablet 9/25/2019 at Unknown time Self Yes Yes   Sig: Take 81 mg by mouth daily   b complex-vitamin C-folic acid (NEPHROCAPS) 1 mg capsule 9/25/2019 at Unknown time  No Yes   Sig: Take 1 capsule by mouth daily with dinner   cholecalciferol (VITAMIN D3) 1,000 units tablet 9/25/2019 at Unknown time  Yes Yes   Sig: Take 1,000 Units by mouth daily   colchicine (COLCRYS) 0 6 mg tablet 9/25/2019 at Unknown time  Yes Yes   Sig: Take 0 6 mg by mouth daily   insulin detemir (LEVEMIR) 100 units/mL subcutaneous injection 9/25/2019 at Unknown time  Yes Yes   Sig: Inject 10 Units under the skin daily at bedtime   insulin lispro (HumaLOG) 100 units/mL injection 9/25/2019 at Unknown time  Yes Yes   Sig: Inject under the skin 3 (three) times a day before meals   methenamine hippurate (HIPREX) 1 g tablet 9/25/2019 at Unknown time  Yes Yes   Sig: Take 1 g by mouth 2 (two) times a day with meals   oxyCODONE-acetaminophen (PERCOCET) 5-325 mg per tablet 9/25/2019 at Unknown time Outside Facility ([address] [place] [address]) Yes Yes   Sig: Take 1 tablet by mouth every 4 (four) hours as needed for moderate pain   pantoprazole (PROTONIX) 40 mg tablet 9/25/2019 at Unknown time Self No Yes   Sig: Take 1 tablet (40 mg total) by mouth daily in the early morning   sodium bicarbonate 650 mg tablet 9/25/2019 at Unknown time  No Yes   Sig: Take 1 tablet (650 mg total) by mouth 2 (two) times daily after meals   tamsulosin (FLOMAX) 0 4 mg 9/25/2019 at Unknown time Self No Yes   Sig: Take 1 capsule (0 4 mg total) by mouth daily with dinner      Facility-Administered Medications: None       Past Medical History:   Diagnosis Date    BPH (benign prostatic hyperplasia)     Cancer (HCC)     liver cancer    Cardiac disease     Coronary artery disease     Diabetes mellitus ([address] )     DJD (degenerative joint disease)     Gait disturbance     Generalized weakness     GERD (gastroesophageal reflux disease)     Gout     History of transfusion     Hyperlipidemia     Hypertension     Pressure injury of skin     Renal disorder     Type 2 diabetes 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, with long-term current use of insulin ([facility] ) 1/23/2019       Past Surgical History:   Procedure Laterality Date    CORONARY ANGIOPLASTY WITH STENT PLACEMENT      IR IMAGE GUIDED BIOPSY/ASPIRATION LIVER  1/24/2019    IR TUBE PLACEMENT ROQUE  9/6/2019       Family History   Problem Relation Age of Onset    Cancer Mother     Hypertension Father     Cancer Brother     Cancer Maternal Grandmother     Cancer Paternal Aunt      I have reviewed and agree with the history as documented  Social History     Tobacco Use    Smoking status: Never Smoker    Smokeless tobacco: Never Used   Substance Use Topics    Alcohol use: Never     Frequency: Never    Drug use: Never        Review of Systems   Constitutional: Negative for activity change, appetite change, chills and fever  HENT: Negative for congestion, ear pain, rhinorrhea, sneezing and sore throat  Eyes: Negative for discharge  Respiratory: Negative for cough and shortness of breath  Cardiovascular: Negative for chest pain and leg swelling  Gastrointestinal: Positive for abdominal pain (earlier resovled)  Negative for blood in stool, diarrhea, nausea and vomiting  Endocrine: Negative for polyuria  Genitourinary: Negative for difficulty urinating, dysuria, frequency and urgency  Musculoskeletal: Negative for back pain and myalgias  Skin: Negative for rash  Neurological: Positive for weakness (generalized )  Negative for dizziness and numbness  Hematological: Negative for adenopathy  Psychiatric/Behavioral: Negative for confusion  All other systems reviewed and are negative  Physical Exam  Physical Exam   Constitutional: He appears well-developed  No distress  Chronically ill appearing   HENT:   Head: Normocephalic and atraumatic     Right Ear: External ear normal    Left Ear: External ear normal  Nose: Nose normal    Dry oropharynx; TMs pale bilaterally   Eyes: Pupils are equal, round, and reactive to light  Conjunctivae and EOM are normal  Right eye exhibits no discharge  Left eye exhibits no discharge  No scleral icterus  Neck: Normal range of motion  Neck supple  Cardiovascular: Normal rate, regular rhythm, normal heart sounds and intact distal pulses  Pulmonary/Chest: Effort normal and breath sounds normal  No respiratory distress  Abdominal: Soft  Bowel sounds are normal  He exhibits no distension and no mass  There is no tenderness  There is no rebound and no guarding  Cholecystomy site clean dry and intact, new dressing placed  Back: no mildline or CVA tenderness   Genitourinary:   Genitourinary Comments: Haile will yellow urine to gravity   Musculoskeletal: Normal range of motion  He exhibits no edema, tenderness or deformity  Lymphadenopathy:     He has no cervical adenopathy  Neurological: He is alert  No cranial nerve deficit or sensory deficit  He exhibits normal muscle tone  Coordination normal    Oriented to self place    Skin: Skin is warm and dry  Capillary refill takes less than 2 seconds  He is not diaphoretic  Psychiatric: He has a normal mood and affect  Nursing note and vitals reviewed        Vital Signs  ED Triage Vitals [193]   Temperature Pulse Respirations Blood Pressure SpO2   99 °F (37 2 °C) 82 18 129/71 97 %      Temp Source Heart Rate Source Patient Position - Orthostatic VS BP Location FiO2 (%)   Temporal Monitor Lying Left arm --      Pain Score       No Pain           Vitals:    19 2243 19 2300 19 2330 19 0030   BP: 129/71 118/62 137/68 130/71   Pulse: 82 81 82 75   Patient Position - Orthostatic VS: Lying   Lying         Visual Acuity      ED Medications  Medications   sodium chloride 0 9 % infusion (150 mL/hr Intravenous New Bag 19 0245)   sodium chloride 0 9 % bolus 1,000 mL (1,000 mL Intravenous New Bag 19 2619)       Diagnostic Studies  Results Reviewed     Procedure Component Value Units Date/Time    Blood culture #1 [[id number]] Collected:  09/25/19 2317    Lab Status: In process Specimen:  Blood from Arm, Right Updated:  09/26/19 0308    Blood culture #2 [[id number]] Collected:  09/26/19 0302    Lab Status: In process Specimen:  Blood from Hand, Right Updated:  09/26/19 0308    Lactic acid, plasma [[id number]] Collected:  09/26/19 0302    Lab Status: In process Specimen:  Blood from Hand, Right Updated:  09/26/19 0308    Lactic acid, plasma [[id number]]  (Abnormal) Collected:  09/25/19 2317    Lab Status:  Final result Specimen:  Blood from Arm, Right Updated:  09/25/19 2347     LACTIC ACID 2 5 mmol/L     Narrative:       Result may be elevated if tourniquet was used during collection      Troponin I [[id number]]  (Normal) Collected:  09/25/19 2317    Lab Status:  Final result Specimen:  Blood from Arm, Right Updated:  09/25/19 2340     Troponin I <0 02 ng/mL     Comprehensive metabolic panel [[id number]]  (Abnormal) Collected:  09/25/19 2317    Lab Status:  Final result Specimen:  Blood from Arm, Right Updated:  09/25/19 2338     Sodium 135 mmol/L      Potassium 4 4 mmol/L      Chloride 103 mmol/L      CO2 21 mmol/L      ANION GAP 11 mmol/L      BUN 50 mg/dL      Creatinine 3 06 mg/dL      Glucose 253 mg/dL      Calcium 8 6 mg/dL      AST 31 U/L      ALT 45 U/L      Alkaline Phosphatase 239 U/L      Total Protein 6 8 g/dL      Albumin 2 8 g/dL      Total Bilirubin 0 30 mg/dL      eGFR 18 ml/min/1 73sq m     Narrative:       [facility] guidelines for Chronic Kidney Disease (CKD):     Stage 1 with normal or high GFR (GFR > 90 mL/min/1 73 square meters)    Stage 2 Mild CKD (GFR = 60-89 mL/min/1 73 square meters)    Stage 3A Moderate CKD (GFR = 45-59 mL/min/1 73 square meters)    Stage 3B Moderate CKD (GFR = 30-44 mL/min/1 73 square meters)    Stage 4 Severe CKD (GFR = 15-29 mL/min/1 73 square meters)    Stage 5 End Stage CKD (GFR <15 mL/min/1 73 square meters)  Note: GFR calculation is accurate only with a steady state creatinine    Lipase [[id number]]  (Abnormal) Collected:  09/25/19 2317    Lab Status:  Final result Specimen:  Blood from Arm, Right Updated:  09/25/19 2333     Lipase 1,155 u/L     CBC and differential [[id number]]  (Abnormal) Collected:  09/25/19 2317    Lab Status:  Final result Specimen:  Blood from Arm, Right Updated:  09/25/19 2323     WBC 9 57 Thousand/uL      RBC 3 52 Million/uL      Hemoglobin 10 3 g/dL      Hematocrit 31 4 %      MCV 89 fL      MCH 29 3 pg      MCHC 32 8 g/dL      RDW 14 3 %      MPV 10 3 fL      Platelets 964 Thousands/uL      nRBC 0 /100 WBCs      Neutrophils Relative 71 %      Immat GRANS % 0 %      Lymphocytes Relative 19 %      Monocytes Relative 7 %      Eosinophils Relative 2 %      Basophils Relative 1 %      Neutrophils Absolute 6 81 Thousands/µL      Immature Grans Absolute 0 03 Thousand/uL      Lymphocytes Absolute 1 79 Thousands/µL      Monocytes Absolute 0 71 Thousand/µL      Eosinophils Absolute 0 14 Thousand/µL      Basophils Absolute 0 09 Thousands/µL                  CT abdomen pelvis wo contrast   Final Result by Stephanie Allen MD (09/26 9546)      Previously seen partially calcified mass over the mesentery measuring 4 3 x 2 5 x 2 2 cm is again noted and is unchanged in appearance  Previously seen left iliac partially calcified metastatic lymph node measuring 1 cm in short axis is again noted    unchanged  No CT evidence for pancreatitis  Percutaneous cholecystostomy tube is again noted  Workstation performed: [id number]         XR chest 1 view portable   ED Interpretation by Rolanda Myers MD (09/26 6678)   Read by me; Radiologist to provide formal interpretation   No acute process                 Procedures  ECG 12 Lead Documentation Only  Date/Time: 9/25/2019 11:16 PM  Performed by: Rolanda Myers MD  Authorized by: Baldo Vargas MD     Indications / Diagnosis:  Abdominal pain   ECG reviewed by me, the ED Provider: yes    Patient location:  ED  Previous ECG:     Previous ECG:  Compared to current    Comparison ECG info:  6/30/19    Similarity:  No change  Rate:     ECG rate:  79    ECG rate assessment: normal    Rhythm:     Rhythm: sinus rhythm    QRS:     QRS axis:  Left  Comments:      No acute ischemic changes           ED Course  ED Course as of Sep 26 0311   Wed Sep 25, 2019   2345 HB 10 3 improved from 9 7 previously 8 days ago      Thu Sep 26, 2019   0100 Prev CR 2 62 8 days ago      0139 Case reviewed with Dr Marva Ford recommends admit to medicine service for supportive care, no empiric antibiotics  0151 BONILLA drain (cholecystomy) emptied upon arrival now with approx 5ml in bulb      0213 Contacted Dr Artemio Vieira via tiger text  Repeat lactic acid pending      0221 Dr Artemio Vieira recommend full admit                                  MDM  Number of Diagnoses or Management Options  Acute pancreatitis:   SILVERIO (acute kidney injury) [facility]): Anemia:   Cholecystostomy care [facility]):   Diagnosis management comments: Mdm:  Will reassess for any evidence of infection elevated lipase or LFTs acute coronary syndrome pleural effusions electrolyte abnormalities        Disposition  Final diagnoses:   Acute pancreatitis   SILVERIO (acute kidney injury) ([facility] )   Cholecystostomy care ([facility] ) - placed 9/6/19   Anemia     Time reflects when diagnosis was documented in both MDM as applicable and the Disposition within this note     Time User Action Codes Description Comment    9/26/2019  1:51 AM Molinda Bones Add [K85 90] Acute pancreatitis     9/26/2019  1:51 AM Romanenko, Gean Lanes Add [N17 9] SILVERIO (acute kidney injury) ([facility] )     9/26/2019  2:10 AM Molinda Bones Add [Z43 4] Cholecystostomy care ([facility] )     9/26/2019  2:10 AM Molinda Bones Modify [Z43 4] Cholecystostomy care [facility]) placed 9/6/19 9/26/2019  2:11 AM Devon [address] [facility] [D64 9] Anemia       ED Disposition     ED Disposition Condition Date/Time Comment    Admit Stable Thu Sep 26, 2019  2:31 AM Case was discussed with Dr Sidney Sparks and the patient's admission status was agreed to be Admission Status: inpatient status to the service of Dr Sidney Sparks   Follow-up Information    None         Patient's Medications   Discharge Prescriptions    No medications on file     No discharge procedures on file      ED Provider  Electronically Signed by           Vance Vázquez MD  09/26/19 0158

## 2019-09-26 NOTE — CONSULTS
Consultation - Nephrology   Jerry Rosenbaum [de-identified] y o  male MRN: [id number]  Unit/Bed#: 471-57 Encounter: [id number]      A/P:  1  Acute renal failure likely due to volume depletion              Slightly improved with volume expansion, would continue with IV fluids at this time until the patient is eating and drinking on his own  Will assess urine sodium creatinine if appropriate  2  Chronic kidney disease stage 3 with baseline creatinine likely around 1 4 - 1 5 mg/dL  3  Probable diabetic nephropathy  4  Proteinuria               Noted in the past, most likely due to diabetes  No further assessment this time  5  Acidosis                [id number] is taking oral medications, continue with sodium bicarbonate supplementation at this time further  6  Neuroendocrine tumor               Continue follow-up with Dr Romero Greenberg is continue with Lanreotide injections every 4 weeks for now   =============  7  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             Continue twice methenamine hippurate for chronic urinary tract infection prophylaxis  8  Right hydroureter nephrosis              This had improved since recent admission, if creatinine does not improve may need to re-evaluate patient's anatomy  9  Acute pancreatitis               continue IV fluids, patient currently NPO, diet to be advanced as indicated by surgery/hospitalist          Thank you for allowing us to participate in the care of your patient  Please feel free to contact us regarding the care of this patient, or any other questions/concerns that may be applicable      Patient Active Problem List   Diagnosis    Weakness generalized    Type 2 diabetes 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, with long-term current use of insulin (HCC)    Essential hypertension    Mixed hyperlipidemia    Cardiac disease    Generalized abdominal mass    Hydroureter    Metabolic acidosis    Iron deficiency anemia secondary to inadequate dietary iron intake    Accelerated hypertension    Liver mass    Neuroendocrine tumor    Acute cystitis without hematuria    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)    Acute renal failure superimposed on stage 4 chronic kidney disease (HCC)    Pressure injury of right heel, unstageable (HCC)    Atherosclerosis of artery of extremity with ulceration (HCC)    Carcinoid syndrome (HCC)    Acute cystitis with hematuria    Chronic indwelling Haile catheter    Moderate protein-calorie malnutrition (HCC)    Biliary sludge    Urinary tract infection due to extended-spectrum beta lactamase (ESBL) producing Escherichia coli    Malignant neuroendocrine neoplasm (HCC)    Generalized abdominal pain    Urinary retention    Acute pancreatitis without infection or necrosis       History of Present Illness   Physician Requesting Consult: Araceli Leung MD  Reason for Consult / Principal Problem acute:  Acute renal failure  Hx and PE limited by:   HPI: Analisa Davidson is a [de-identified]y o  year old male who presented to the emergency department September 26 with complaints of severe abdominal pain epigastric region which began abruptly earlier in the day  Pain was nonradiating at the time and not wanting to drink anything since its onset  Patient recently had abdominal pain which was different in quality according to the patient and eventually noted to have cholecystitis which is currently being managed with a percutaneous cholecystostomy  Patient denies any new foods, although he believes he ate some and is which may have caused his belly pain  This was mixed in with eggs in an egg salad  Patient claims that he has been eating and drinking more appropriately since being discharged from the hospital and at the nursing home until this episode  From the renal standpoint, patient has known underlying chronic kidney disease with presumed baseline creatinine of 1 4-1 5 mg/dL    Patient has been in and out of acute kidney injury multiple times  Presentation to the hospital noted creatinine 3 06 mg/dL, with improvement down to 2 8 mg/dL  Patient denies any use of nonsteroidal anti-inflammatory medications continue all medications as prescribed  Remains NPO at this time  History obtained from chart review and the patient    Review of Systems - Negative except as mentioned above in HPI, more specifics as mentioned below    Review of Systems - General ROS: positive for  - fatigue  Psychological ROS: negative  Ophthalmic ROS: negative  ENT ROS: negative  Allergy and Immunology ROS: negative  Hematological and Lymphatic ROS: negative  Endocrine ROS: negative  Respiratory ROS: no cough, shortness of breath, or wheezing  Cardiovascular ROS: no chest pain or dyspnea on exertion  Gastrointestinal ROS:  As mentioned above  Genito-Urinary ROS: no dysuria, trouble voiding, or hematuria  Patient has chronic Haile catheter  Musculoskeletal ROS: positive for - gait disturbance  Neurological ROS: no TIA or stroke symptoms  Dermatological ROS: negative    Historical Information   Past Medical History:   Diagnosis Date    Acute pancreatitis without infection or necrosis 9/26/2019    BPH (benign prostatic hyperplasia)     Cancer (HCC)     liver cancer    Cardiac disease     Coronary artery disease     Diabetes mellitus (HCC)     DJD (degenerative joint disease)     Gait disturbance     Generalized weakness     GERD (gastroesophageal reflux disease)     Gout     History of transfusion     Hyperlipidemia     Hypertension     Pressure injury of skin     Renal disorder     Type 2 diabetes 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, with long-term current use of insulin ([facility] [address] ) 1/23/2019     Past Surgical History:   Procedure Laterality Date    CORONARY ANGIOPLASTY WITH STENT PLACEMENT      IR IMAGE GUIDED BIOPSY/ASPIRATION LIVER  1/24/2019    IR TUBE PLACEMENT ROQUE  9/6/2019     Social History   Social History     Substance and Sexual Activity   Alcohol Use Never    Frequency: Never     Social History     Substance and Sexual Activity   Drug Use Never     Social History     Tobacco Use   Smoking Status Never Smoker   Smokeless Tobacco Never Used     Family History   Problem Relation Age of Onset    Cancer Mother     Hypertension Father     Cancer Brother     Cancer Maternal Grandmother     Cancer Paternal Aunt        Meds/Allergies   all current active meds have been reviewed, current meds:   Current Facility-Administered Medications   Medication Dose Route Frequency    aluminum-magnesium hydroxide-simethicone (MYLANTA) 200-200-20 mg/5 mL oral suspension 30 mL  30 mL Oral Q6H PRN    amLODIPine (NORVASC) tablet 10 mg  10 mg Oral Daily    aspirin chewable tablet 81 mg  81 mg Oral Daily    b complex-vitamin C-folic acid (NEPHROCAPS) capsule 1 capsule  1 capsule Oral Daily With Dinner    cholecalciferol (VITAMIN D3) tablet 1,000 Units  1,000 Units Oral Daily    colchicine (COLCRYS) tablet 0 6 mg  0 6 mg Oral Daily    dextrose 5 % and sodium chloride 0 9 % infusion  100 mL/hr Intravenous Continuous    docusate sodium (COLACE) capsule 100 mg  100 mg Oral BID PRN    heparin (porcine) subcutaneous injection 5,000 Units  5,000 Units Subcutaneous Q8H [facility]    insulin detemir (LEVEMIR) subcutaneous injection 10 Units  10 Units Subcutaneous HS    insulin lispro (HumaLOG) 100 units/mL subcutaneous injection 1-5 Units  1-5 Units Subcutaneous TID AC    insulin lispro (HumaLOG) 100 units/mL subcutaneous injection 1-5 Units  1-5 Units Subcutaneous HS    methenamine hippurate (HIPREX) tablet 1 g  1 g Oral BID With Meals    ondansetron (ZOFRAN) injection 4 mg  4 mg Intravenous Q6H PRN    pantoprazole (PROTONIX) EC tablet 40 mg  40 mg Oral Early Morning    sodium bicarbonate tablet 650 mg  650 mg Oral BID after meals    tamsulosin (FLOMAX) capsule 0 4 mg  0 4 mg Oral Daily With Dinner    and PTA meds: Medications Prior to Admission   Medication    acetaminophen (TYLENOL) 325 mg tablet    amLODIPine (NORVASC) 10 mg tablet    aspirin 81 MG tablet    b complex-vitamin C-folic acid (NEPHROCAPS) 1 mg capsule    cholecalciferol (VITAMIN D3) 1,000 units tablet    colchicine (COLCRYS) 0 6 mg tablet    insulin detemir (LEVEMIR) 100 units/mL subcutaneous injection    insulin lispro (HumaLOG) 100 units/mL injection    methenamine hippurate (HIPREX) 1 g tablet    oxyCODONE-acetaminophen (PERCOCET) 5-325 mg per tablet    pantoprazole (PROTONIX) 40 mg tablet    sodium bicarbonate 650 mg tablet    tamsulosin (FLOMAX) 0 4 mg         No Known Allergies    Objective     Intake/Output Summary (Last 24 hours) at 9/26/2019 0902  Last data filed at 9/26/2019 0408  Gross per 24 hour   Intake    Output 360 ml   Net -360 ml       Invasive Devices:   Urethral Catheter Latex 16 Fr  (Active)   Reasons to continue Urinary Catheter  Chronic urinary catheter 9/26/2019  5:01 AM   Goal for Removal N/A- chronic garcia 9/26/2019  5:01 AM   Site Assessment Clean;Skin intact 9/26/2019  5:01 AM   Collection Container Standard drainage bag 9/26/2019  5:01 AM   Securement Method Securing device (Describe) 9/26/2019  5:01 AM       Physical Exam      I/O last 3 completed shifts:  In: -   Out: 360 [Urine:350; Other:10]    Vitals:    09/26/19 0823   BP: 129/58   Pulse: 79   Resp: 18   Temp:    SpO2: 98%       Gen: in NAD, Alert/Awake  HEENT: no sclerous icterus, MMM, neck supple  CV: +S1/S2, RRR  Lungs: CTA bilaterally  Abd: +BS, soft NT/ND  Ext: all four extremities are warm  Skin: no rashes noted  Neuro: CN II-XII intact    Current Weight: Weight - Scale: 68 kg (149 lb 14 6 oz)  First Weight: Weight - Scale: 70 9 kg (156 lb 4 9 oz)    Lab Results:  I have personally reviewed pertinent labs      CBC:   Lab Results   Component Value Date    WBC 8 57 09/26/2019    HGB 10 1 (L) 09/26/2019    HCT 31 0 (L) 09/26/2019    MCV 91 09/26/2019    PLT 209 09/26/2019    MCH 29 8 09/26/2019    MCHC 32 6 09/26/2019    RDW 14 4 09/26/2019    MPV 10 8 09/26/2019    NRBC 0 09/26/2019     CMP:   Lab Results   Component Value Date    K 4 5 09/26/2019     09/26/2019    CO2 22 09/26/2019    BUN 46 (H) 09/26/2019    CREATININE 2 86 (H) 09/26/2019    CALCIUM 8 4 09/26/2019    AST 22 09/26/2019    ALT 31 09/26/2019    ALKPHOS 212 (H) 09/26/2019    EGFR 20 09/26/2019     Phosphorus:   Lab Results   Component Value Date    PHOS 4 4 (H) 09/26/2019     Magnesium:   Lab Results   Component Value Date    MG 1 9 09/26/2019     Urinalysis: No results found for: Latonia Dad, SPECGRAV, PHUR, LEUKOCYTESUR, NITRITE, PROTEINUA, GLUCOSEU, KETONESU, BILIRUBINUR, BLOODU  Ionized Calcium: No results found for: CAION  Coagulation:   Lab Results   Component Value Date    INR 1 01 09/26/2019     Troponin:   Lab Results   Component Value Date    TROPONINI <0 02 09/25/2019     ABG: No results found for: PHART, JWP6XDA, PO2ART, EKO5ZWM, U1MNZQUX, BEART, SOURCE    Results from last 7 days   Lab Units 09/26/19  0500 09/25/19  2317   POTASSIUM mmol/L 4 5 4 4   CHLORIDE mmol/L 106 103   CO2 mmol/L 22 21   BUN mg/dL 46* 50*   CREATININE mg/dL 2 86* 3 06*   CALCIUM mg/dL 8 4 8 6   ALK PHOS U/L 212* 239*   ALT U/L 31 45   AST U/L 22 31       Radiology review:  Procedure: Ct Abdomen Pelvis Wo Contrast    Result Date: 9/26/2019  Narrative: CT ABDOMEN AND PELVIS WITHOUT IV CONTRAST INDICATION:   9/6/19 cholecystomy tube; elevated lipase, abdm pain earlier  COMPARISON:  9/15/2019  TECHNIQUE:  CT examination of the abdomen and pelvis was performed without intravenous contrast   Axial, sagittal, and coronal 2D reformatted images were created from the source data and submitted for interpretation  Radiation dose length product (DLP) for this visit:  487 46 mGy-cm     This examination, like all CT scans performed in the [facility], was performed utilizing techniques to minimize radiation dose exposure, including the use of iterative  reconstruction and automated exposure control  Enteric contrast was administered  FINDINGS: ABDOMEN LOWER CHEST:  No clinically significant abnormality identified in the visualized lower chest  LIVER/BILIARY TREE:  Multiple hypodense lesions are seen within the liver concerning for metastasis  Pablito Carreno GALLBLADDER:  Percutaneous cholecystostomy tube is again noted  SPLEEN:  Unremarkable  PANCREAS:  Unremarkable  ADRENAL GLANDS:  Unremarkable  KIDNEYS/URETERS:  Unremarkable  No hydronephrosis  STOMACH AND BOWEL:  Unremarkable  APPENDIX:  No findings to suggest appendicitis  ABDOMINOPELVIC CAVITY:  No ascites or free intraperitoneal air  Previously seen partially calcified mass over the mesentery measuring 4 3 x 2 5 x 2 2 cm is again noted and is unchanged in appearance  Previously seen left iliac partially calcified metastatic lymph node measuring 1 cm in short axis is again noted unchanged  VESSELS:  Unremarkable for patient's age  PELVIS REPRODUCTIVE ORGANS:  The prostate is enlarged  URINARY BLADDER:  Decompressed by Haile catheter  Bladder wall is thickened concerning for cystitis  ABDOMINAL WALL/INGUINAL REGIONS:  Unremarkable  OSSEOUS STRUCTURES:  No acute fracture or destructive osseous lesion  Impression: Previously seen partially calcified mass over the mesentery measuring 4 3 x 2 5 x 2 2 cm is again noted and is unchanged in appearance  Previously seen left iliac partially calcified metastatic lymph node measuring 1 cm in short axis is again noted unchanged  No CT evidence for pancreatitis  Percutaneous cholecystostomy tube is again noted  Workstation performed: [id number]     Procedure: Xr Chest 1 View Portable    Result Date: 9/26/2019  Narrative: CHEST INDICATION:   Abdominal pain  COMPARISON:  CT of the abdomen/pelvis dated 9/26/2019  Chest radiograph dated 6/30/2019  EXAM PERFORMED/VIEWS:  XR CHEST PORTABLE FINDINGS: Nodular opacity at the right lung base  Review of CT abdomen /pelvis from same day shows the areas covered in this is confirmed as nipple shadow  Normal lung volumes  No acute consolidation, pneumothorax, or effusion  Heart size normal   Normal pulmonary vasculature  Age-appropriate degenerative changes in the shoulders  No acute osseous findings  Cholecystostomy catheter projects over the right upper quadrant  Impression: No acute cardiopulmonary findings  Note: I agree with the preliminary interpretation by the ED care provider documented in Epic   Workstation performed: [id number]         Robe Bond DO

## 2019-09-26 NOTE — PLAN OF CARE
Problem: Potential for Falls  Goal: Patient will remain free of falls  Description  INTERVENTIONS:  - Assess patient frequently for physical needs  -  Identify cognitive and physical deficits and behaviors that affect risk of falls  -  [place] fall precautions as indicated by assessment  High fall risk    - Educate patient/family on patient safety including physical limitations  - Instruct patient to call for assistance with activity based on assessment  - Modify environment to reduce risk of injury  - Consider OT/PT consult to assist with strengthening/mobility   Outcome: Progressing     Problem: Prexisting or High Potential for Compromised Skin Integrity  Goal: Skin integrity is maintained or improved  Description  INTERVENTIONS:  - Identify patients at risk for skin breakdown  - Assess and monitor skin integrity  - Assess and monitor nutrition and hydration status  - Monitor labs   - Assess for incontinence   - Turn and reposition patient  - Assist with mobility/ambulation  - Relieve pressure over bony prominences  - Avoid friction and shearing  - Provide appropriate hygiene as needed including keeping skin clean and dry  - Evaluate need for skin moisturizer/barrier cream  - Collaborate with interdisciplinary team   - Patient/family teaching  - Consider wound care consult   Outcome: Progressing     Problem: Nutrition/Hydration-ADULT  Goal: Nutrient/Hydration intake appropriate for improving, restoring or maintaining nutritional needs  Description  Monitor and assess patient's nutrition/hydration status for malnutrition  Collaborate with interdisciplinary team and initiate plan and interventions as ordered  Monitor patient's weight and dietary intake as ordered or per policy  Utilize nutrition screening tool and intervene as necessary  Determine patient's food preferences and provide high-protein, high-caloric foods as appropriate       INTERVENTIONS:  - Monitor oral intake, urinary output, labs, and treatment plans  - Assess nutrition and hydration status and recommend course of action  - Evaluate amount of meals eaten  - Assist patient with eating if necessary   - Allow adequate time for meals  - Recommend/ encourage appropriate diets, oral nutritional supplements, and vitamin/mineral supplements  - Order, calculate, and assess calorie counts as needed  - Recommend, monitor, and adjust tube feedings and TPN/PPN based on assessed needs  - Assess need for intravenous fluids  - Provide specific nutrition/hydration education as appropriate  - Include patient/family/caregiver in decisions related to nutrition  Outcome: Progressing     Problem: PAIN - ADULT  Goal: Verbalizes/displays adequate comfort level or baseline comfort level  Description  Interventions:  - Encourage patient to monitor pain and request assistance  - Assess pain using appropriate pain scale  - Administer analgesics based on type and severity of pain and evaluate response  - Implement non-pharmacological measures as appropriate and evaluate response  - Consider cultural and social influences on pain and pain management  - Notify physician/advanced practitioner if interventions unsuccessful or patient reports new pain  Outcome: Progressing     Problem: INFECTION - ADULT  Goal: Absence or prevention of progression during hospitalization  Description  INTERVENTIONS:  - Assess and monitor for signs and symptoms of infection  - Monitor lab/diagnostic results  - Monitor all insertion sites, i e  indwelling lines, tubes, and drains  - [place] appropriate cooling/warming therapies per order  - Administer medications as ordered  - Instruct and encourage patient and family to use good hand hygiene technique  - Identify and instruct in appropriate isolation precautions for identified infection/condition   Outcome: Progressing     Problem: SAFETY ADULT  Goal: Maintain or return to baseline ADL function  Description  INTERVENTIONS:  -  Assess patient's ability to carry out ADLs; assess patient's baseline for ADL function and identify physical deficits which impact ability to perform ADLs (bathing, care of mouth/teeth, toileting, grooming, dressing, etc )  - Assess/evaluate cause of self-care deficits   - Assess range of motion  - Assess patient's mobility; develop plan if impaired  - Assess patient's need for assistive devices and provide as appropriate  - Encourage maximum independence but intervene and supervise when necessary  - Involve family in performance of ADLs  - Assess for home care needs following discharge   - Consider OT consult to assist with ADL evaluation and planning for discharge  - Provide patient education as appropriate  Outcome: Progressing  Goal: Maintain or return mobility status to optimal level  Description  INTERVENTIONS:  - Assess patient's baseline mobility status (ambulation, transfers, stairs, etc )    - Identify cognitive and physical deficits and behaviors that affect mobility  - Identify mobility aids required to assist with transfers and/or ambulation (gait belt, sit-to-stand, lift, walker, cane, etc )  - [place] fall precautions as indicated by assessment  - Record patient progress and toleration of activity level on Mobility SBAR; progress patient to next Phase/Stage  - Instruct patient to call for assistance with activity based on assessment  - Consider rehabilitation consult to assist with strengthening/weightbearing, etc   Outcome: Progressing     Problem: DISCHARGE PLANNING  Goal: Discharge to home or other facility with appropriate resources  Description  INTERVENTIONS:  - Identify barriers to discharge w/patient and caregiver  - Arrange for needed discharge resources and transportation as appropriate  - Identify discharge learning needs (meds, wound care, etc )  - Arrange for interpretive services to assist at discharge as needed  - Refer to Case Management Department for coordinating discharge planning if the patient needs post-hospital services based on physician/advanced practitioner order or complex needs related to functional status, cognitive ability, or social support system  Outcome: Progressing     Problem: Knowledge Deficit  Goal: Patient/family/caregiver demonstrates understanding of disease process, treatment plan, medications, and discharge instructions  Description  Complete learning assessment and assess knowledge base    Interventions:  - Provide teaching at level of understanding  - Provide teaching via preferred learning methods  Outcome: Progressing     Problem: GASTROINTESTINAL - ADULT  Goal: Minimal or absence of nausea and/or vomiting  Description  INTERVENTIONS:  - Administer IV fluids if ordered to ensure adequate hydration  - Maintain NPO status until nausea and vomiting are resolved  - Nasogastric tube if ordered  - Administer ordered antiemetic medications as needed  - Provide nonpharmacologic comfort measures as appropriate  - Advance diet as tolerated, if ordered  - Consider nutrition services referral to assist patient with adequate nutrition and appropriate food choices  Outcome: Progressing  Goal: Maintains or returns to baseline bowel function  Description  INTERVENTIONS:  - Assess bowel function  - Encourage oral fluids to ensure adequate hydration  - Administer IV fluids if ordered to ensure adequate hydration  - Administer ordered medications as needed  - Encourage mobilization and activity  - Consider nutritional services referral to assist patient with adequate nutrition and appropriate food choices  Outcome: Progressing  Goal: Maintains adequate nutritional intake  Description  INTERVENTIONS:  - Monitor percentage of each meal consumed  - Identify factors contributing to decreased intake, treat as appropriate  - Assist with meals as needed  - Monitor I&O, weight, and lab values if indicated  - Obtain nutrition services referral as needed  Outcome: Progressing     Problem: DISCHARGE PLANNING - CARE MANAGEMENT  Goal: Discharge to post-acute care or home with appropriate resources  Description  INTERVENTIONS:  - Conduct assessment to determine patient/family and health care team treatment goals, and need for post-acute services based on payer coverage, community resources, and patient preferences, and barriers to discharge  - Address psychosocial, clinical, and financial barriers to discharge as identified in assessment in conjunction with the patient/family and health care team  - Arrange appropriate level of post-acute services according to patient's   needs and preference and payer coverage in collaboration with the physician and health care team  - Communicate with and update the patient/family, physician, and health care team regarding progress on the discharge plan  - Arrange appropriate transportation to post-acute venues  -return to [facility] on dc  -need auth with insurance to return to SNF on dc   Outcome: Progressing     Problem: GENITOURINARY - ADULT  Goal: Urinary catheter remains patent  Description  INTERVENTIONS:  - Assess patency of urinary catheter  - If patient has a chronic garcia, consider changing catheter if non-functioning  - Follow guidelines for intermittent irrigation of non-functioning urinary catheter  Outcome: Progressing     Problem: METABOLIC, FLUID AND ELECTROLYTES - ADULT  Goal: Electrolytes maintained within normal limits  Description  INTERVENTIONS:  - Monitor labs and assess patient for signs and symptoms of electrolyte imbalances  - Administer electrolyte replacement as ordered  - Monitor response to electrolyte replacements, including repeat lab results as appropriate  - Instruct patient on fluid and nutrition as appropriate  Outcome: Progressing  Goal: Fluid balance maintained  Description  INTERVENTIONS:  - Monitor labs   - Monitor I/O and WT  - Instruct patient on fluid and nutrition as appropriate  - Assess for signs & symptoms of volume excess or deficit  Outcome: Progressing  Goal: Glucose maintained within target range  Description  INTERVENTIONS:  - Monitor Blood Glucose as ordered  - Assess for signs and symptoms of hyperglycemia and hypoglycemia  - Administer ordered medications to maintain glucose within target range  - Assess nutritional intake and initiate nutrition service referral as needed  Outcome: Progressing     Problem: SKIN/TISSUE INTEGRITY - ADULT  Goal: Skin integrity remains intact  Description  INTERVENTIONS  - Identify patients at risk for skin breakdown  - Assess and monitor skin integrity  - Assess and monitor nutrition and hydration status  - Monitor labs (i e  albumin)  - Assess for incontinence   - Turn and reposition patient  - Assist with mobility/ambulation  - Relieve pressure over bony prominences  - Avoid friction and shearing  - Provide appropriate hygiene as needed including keeping skin clean and dry  - Evaluate need for skin moisturizer/barrier cream  - Collaborate with interdisciplinary team (i e  Nutrition, Rehabilitation, etc )   - Patient/family teaching  Outcome: Progressing  Goal: Incision(s), wounds(s) or drain site(s) healing without S/S of infection  Description  INTERVENTIONS  - Assess and document risk factors for skin impairment   - Assess and document dressing, incision, wound bed, drain sites and surrounding tissue  - Consider nutrition services referral as needed  - Oral mucous membranes remain intact  - Provide patient/ family education  Outcome: Progressing     Problem: HEMATOLOGIC - ADULT  Goal: Maintains hematologic stability  Description  INTERVENTIONS  - Assess for signs and symptoms of bleeding or hemorrhage  - Monitor labs  - Administer supportive blood products/factors as ordered and appropriate  Outcome: Progressing     Problem: MUSCULOSKELETAL - ADULT  Goal: Maintain or return mobility to safest level of function  Description  INTERVENTIONS:  - Assess patient's ability to carry out ADLs; assess patient's baseline for ADL function and identify physical deficits which impact ability to perform ADLs (bathing, care of mouth/teeth, toileting, grooming, dressing, etc )  - Assess/evaluate cause of self-care deficits   - Assess range of motion  - Assess patient's mobility  - Assess patient's need for assistive devices and provide as appropriate  - Encourage maximum independence but intervene and supervise when necessary  - Involve family in performance of ADLs  - Assess for home care needs following discharge   - Consider OT consult to assist with ADL evaluation and planning for discharge  - Provide patient education as appropriate  Outcome: Progressing

## 2019-09-26 NOTE — PLAN OF CARE
Problem: PHYSICAL THERAPY ADULT  Goal: Performs mobility at highest level of function for planned discharge setting  See evaluation for individualized goals  Outcome: Progressing  Note:   Prognosis: Good  Problem List: Decreased strength, Decreased endurance, Impaired balance, Decreased mobility, Impaired hearing, Decreased safety awareness  Assessment: Pt is an [de-identified]year old male with complex PMH including CA, CAD, DM, HTN weakness and BONILLA drain for percutaneous cholecystectomy  Pt seen for high complexity PT evaluation presenting with decreased LE strength decreased balance endurance forward flexed posture decreased mobility transfers and ambulation requiring increased time to complete task and verbal cues for safety  Pt performing all bed mobility transfers and ambulation at a (S) level with RW however ambulation limited to 80 ft which is less than household distance with mild unsteadiness and increased risk for falls  Pt is in need of continued activity in PT to improve all impairments and functional deficits with goal of return to (I) LOF  Anticipate pt will have to return to SNF to complete rehab prior to returning home        Recommendation: Short-term skilled PT     PT - OK to Discharge: (to next level of care when medically stable for discharge)    See flowsheet documentation for full assessment

## 2019-09-26 NOTE — ASSESSMENT & PLAN NOTE
Malnutrition Findings:        Malnutrition related to prolonged inadequate intake as evidenced by subcutaneous fat loss in bilateral upper extremities and orbital/cheek region, severe temporal wasting, clavicle/sternum visible, sunken orbitals, inability to meet nutrient needs by mouth  BMI Findings: Body mass index is 22 79 kg/m²       · Dietitian/nutritionist consultation  · Continue nutritional supplements

## 2019-09-27 PROBLEM — Z16.12 UTI DUE TO EXTENDED-SPECTRUM BETA LACTAMASE (ESBL) PRODUCING ESCHERICHIA COLI: Status: ACTIVE | Noted: 2019-09-27

## 2019-09-27 PROBLEM — E87.2 LACTIC ACIDOSIS: Status: ACTIVE | Noted: 2019-09-27

## 2019-09-27 PROBLEM — B96.29 UTI DUE TO EXTENDED-SPECTRUM BETA LACTAMASE (ESBL) PRODUCING ESCHERICHIA COLI: Status: ACTIVE | Noted: 2019-09-27

## 2019-09-27 PROBLEM — N39.0 UTI DUE TO EXTENDED-SPECTRUM BETA LACTAMASE (ESBL) PRODUCING ESCHERICHIA COLI: Status: ACTIVE | Noted: 2019-09-27

## 2019-09-27 PROBLEM — N18.30 CKD (CHRONIC KIDNEY DISEASE) STAGE 3, GFR 30-59 ML/MIN (HCC): Status: ACTIVE | Noted: 2019-09-27

## 2019-09-27 LAB
ALBUMIN SERPL BCP-MCNC: 2.4 G/DL (ref 3.5–5)
ALP SERPL-CCNC: 186 U/L (ref 46–116)
ALT SERPL W P-5'-P-CCNC: 23 U/L (ref 12–78)
ANION GAP SERPL CALCULATED.3IONS-SCNC: 11 MMOL/L (ref 4–13)
AST SERPL W P-5'-P-CCNC: 16 U/L (ref 5–45)
BACTERIA UR QL AUTO: ABNORMAL /HPF
BASOPHILS # BLD AUTO: 0.1 THOUSANDS/ΜL (ref 0–0.1)
BASOPHILS NFR BLD AUTO: 1 % (ref 0–1)
BILIRUB SERPL-MCNC: 0.4 MG/DL (ref 0.2–1)
BILIRUB UR QL STRIP: NEGATIVE
BUN SERPL-MCNC: 37 MG/DL (ref 5–25)
CALCIUM SERPL-MCNC: 8.2 MG/DL (ref 8.3–10.1)
CHLORIDE SERPL-SCNC: 111 MMOL/L (ref 100–108)
CK SERPL-CCNC: 12 U/L (ref 39–308)
CLARITY UR: ABNORMAL
CO2 SERPL-SCNC: 20 MMOL/L (ref 21–32)
COLOR UR: YELLOW
CREAT SERPL-MCNC: 2.72 MG/DL (ref 0.6–1.3)
EOSINOPHIL # BLD AUTO: 0.1 THOUSAND/ΜL (ref 0–0.61)
EOSINOPHIL NFR BLD AUTO: 1 % (ref 0–6)
ERYTHROCYTE [DISTWIDTH] IN BLOOD BY AUTOMATED COUNT: 14.5 % (ref 11.6–15.1)
GFR SERPL CREATININE-BSD FRML MDRD: 21 ML/MIN/1.73SQ M
GLUCOSE SERPL-MCNC: 144 MG/DL (ref 65–140)
GLUCOSE SERPL-MCNC: 160 MG/DL (ref 65–140)
GLUCOSE SERPL-MCNC: 160 MG/DL (ref 65–140)
GLUCOSE SERPL-MCNC: 166 MG/DL (ref 65–140)
GLUCOSE SERPL-MCNC: 167 MG/DL (ref 65–140)
GLUCOSE SERPL-MCNC: 262 MG/DL (ref 65–140)
GLUCOSE UR STRIP-MCNC: NEGATIVE MG/DL
HCT VFR BLD AUTO: 28.2 % (ref 36.5–49.3)
HGB BLD-MCNC: 9.2 G/DL (ref 12–17)
HGB UR QL STRIP.AUTO: ABNORMAL
IMM GRANULOCYTES # BLD AUTO: 0.04 THOUSAND/UL (ref 0–0.2)
IMM GRANULOCYTES NFR BLD AUTO: 1 % (ref 0–2)
KETONES UR STRIP-MCNC: NEGATIVE MG/DL
LACTATE SERPL-SCNC: 1.7 MMOL/L (ref 0.5–2)
LEUKOCYTE ESTERASE UR QL STRIP: ABNORMAL
LIPASE SERPL-CCNC: 352 U/L (ref 73–393)
LYMPHOCYTES # BLD AUTO: 1.36 THOUSANDS/ΜL (ref 0.6–4.47)
LYMPHOCYTES NFR BLD AUTO: 19 % (ref 14–44)
MAGNESIUM SERPL-MCNC: 1.8 MG/DL (ref 1.6–2.6)
MCH RBC QN AUTO: 29.8 PG (ref 26.8–34.3)
MCHC RBC AUTO-ENTMCNC: 32.6 G/DL (ref 31.4–37.4)
MCV RBC AUTO: 91 FL (ref 82–98)
MONOCYTES # BLD AUTO: 0.53 THOUSAND/ΜL (ref 0.17–1.22)
MONOCYTES NFR BLD AUTO: 8 % (ref 4–12)
MRSA NOSE QL CULT: NORMAL
NEUTROPHILS # BLD AUTO: 4.9 THOUSANDS/ΜL (ref 1.85–7.62)
NEUTS SEG NFR BLD AUTO: 70 % (ref 43–75)
NITRITE UR QL STRIP: NEGATIVE
NON-SQ EPI CELLS URNS QL MICRO: ABNORMAL /HPF
NRBC BLD AUTO-RTO: 0 /100 WBCS
OTHER STN SPEC: ABNORMAL
PH UR STRIP.AUTO: 5.5 [PH]
PHOSPHATE SERPL-MCNC: 4.2 MG/DL (ref 2.3–4.1)
PLATELET # BLD AUTO: 177 THOUSANDS/UL (ref 149–390)
PMV BLD AUTO: 10.7 FL (ref 8.9–12.7)
POTASSIUM SERPL-SCNC: 4.1 MMOL/L (ref 3.5–5.3)
PROCALCITONIN SERPL-MCNC: 0.09 NG/ML
PROT SERPL-MCNC: 6 G/DL (ref 6.4–8.2)
PROT UR STRIP-MCNC: ABNORMAL MG/DL
RBC # BLD AUTO: 3.09 MILLION/UL (ref 3.88–5.62)
RBC #/AREA URNS AUTO: ABNORMAL /HPF
SODIUM SERPL-SCNC: 142 MMOL/L (ref 136–145)
SP GR UR STRIP.AUTO: 1.01 (ref 1–1.03)
TROPONIN I SERPL-MCNC: 0.02 NG/ML
UROBILINOGEN UR QL STRIP.AUTO: 0.2 E.U./DL
WBC # BLD AUTO: 7.03 THOUSAND/UL (ref 4.31–10.16)
WBC #/AREA URNS AUTO: ABNORMAL /HPF

## 2019-09-27 PROCEDURE — 84145 PROCALCITONIN (PCT): CPT | Performed by: INTERNAL MEDICINE

## 2019-09-27 PROCEDURE — 83690 ASSAY OF LIPASE: CPT | Performed by: PHYSICIAN ASSISTANT

## 2019-09-27 PROCEDURE — G8996 SWALLOW CURRENT STATUS: HCPCS

## 2019-09-27 PROCEDURE — 92610 EVALUATE SWALLOWING FUNCTION: CPT

## 2019-09-27 PROCEDURE — 97116 GAIT TRAINING THERAPY: CPT

## 2019-09-27 PROCEDURE — 80053 COMPREHEN METABOLIC PANEL: CPT | Performed by: INTERNAL MEDICINE

## 2019-09-27 PROCEDURE — 85025 COMPLETE CBC W/AUTO DIFF WBC: CPT | Performed by: INTERNAL MEDICINE

## 2019-09-27 PROCEDURE — 97530 THERAPEUTIC ACTIVITIES: CPT

## 2019-09-27 PROCEDURE — 97110 THERAPEUTIC EXERCISES: CPT

## 2019-09-27 PROCEDURE — 99232 SBSQ HOSP IP/OBS MODERATE 35: CPT | Performed by: INTERNAL MEDICINE

## 2019-09-27 PROCEDURE — G8997 SWALLOW GOAL STATUS: HCPCS

## 2019-09-27 PROCEDURE — 83735 ASSAY OF MAGNESIUM: CPT | Performed by: INTERNAL MEDICINE

## 2019-09-27 PROCEDURE — 82948 REAGENT STRIP/BLOOD GLUCOSE: CPT

## 2019-09-27 PROCEDURE — 84484 ASSAY OF TROPONIN QUANT: CPT | Performed by: INTERNAL MEDICINE

## 2019-09-27 PROCEDURE — 83605 ASSAY OF LACTIC ACID: CPT | Performed by: INTERNAL MEDICINE

## 2019-09-27 PROCEDURE — 84100 ASSAY OF PHOSPHORUS: CPT | Performed by: INTERNAL MEDICINE

## 2019-09-27 PROCEDURE — 82550 ASSAY OF CK (CPK): CPT | Performed by: INTERNAL MEDICINE

## 2019-09-27 PROCEDURE — 97535 SELF CARE MNGMENT TRAINING: CPT

## 2019-09-27 PROCEDURE — 81001 URINALYSIS AUTO W/SCOPE: CPT | Performed by: INTERNAL MEDICINE

## 2019-09-27 RX ORDER — SODIUM CHLORIDE 9 MG/ML
75 INJECTION, SOLUTION INTRAVENOUS CONTINUOUS
Status: DISCONTINUED | OUTPATIENT
Start: 2019-09-27 | End: 2019-09-28

## 2019-09-27 RX ORDER — ACETAMINOPHEN 325 MG/1
650 TABLET ORAL EVERY 6 HOURS PRN
Status: DISCONTINUED | OUTPATIENT
Start: 2019-09-27 | End: 2019-09-30 | Stop reason: HOSPADM

## 2019-09-27 RX ADMIN — TAMSULOSIN HYDROCHLORIDE 0.4 MG: 0.4 CAPSULE ORAL at 16:34

## 2019-09-27 RX ADMIN — METHENAMINE HIPPURATE 1 G: 1 TABLET ORAL at 16:34

## 2019-09-27 RX ADMIN — INSULIN LISPRO 1 UNITS: 100 INJECTION, SOLUTION INTRAVENOUS; SUBCUTANEOUS at 16:38

## 2019-09-27 RX ADMIN — SODIUM BICARBONATE 650 MG TABLET 650 MG: at 16:34

## 2019-09-27 RX ADMIN — INSULIN LISPRO 1 UNITS: 100 INJECTION, SOLUTION INTRAVENOUS; SUBCUTANEOUS at 22:03

## 2019-09-27 RX ADMIN — SODIUM CHLORIDE 75 ML/HR: 0.9 INJECTION, SOLUTION INTRAVENOUS at 20:31

## 2019-09-27 RX ADMIN — AMLODIPINE BESYLATE 10 MG: 10 TABLET ORAL at 09:06

## 2019-09-27 RX ADMIN — METHENAMINE HIPPURATE 1 G: 1 TABLET ORAL at 09:06

## 2019-09-27 RX ADMIN — HEPARIN SODIUM 5000 UNITS: 5000 INJECTION INTRAVENOUS; SUBCUTANEOUS at 14:34

## 2019-09-27 RX ADMIN — ERTAPENEM SODIUM 500 MG: 1 INJECTION, POWDER, LYOPHILIZED, FOR SOLUTION INTRAMUSCULAR; INTRAVENOUS at 14:33

## 2019-09-27 RX ADMIN — VITAMIN D, TAB 1000IU (100/BT) 1000 UNITS: 25 TAB at 09:06

## 2019-09-27 RX ADMIN — INSULIN LISPRO 2 UNITS: 100 INJECTION, SOLUTION INTRAVENOUS; SUBCUTANEOUS at 11:08

## 2019-09-27 RX ADMIN — HEPARIN SODIUM 5000 UNITS: 5000 INJECTION INTRAVENOUS; SUBCUTANEOUS at 05:00

## 2019-09-27 RX ADMIN — ASPIRIN 81 MG 81 MG: 81 TABLET ORAL at 09:06

## 2019-09-27 RX ADMIN — HEPARIN SODIUM 5000 UNITS: 5000 INJECTION INTRAVENOUS; SUBCUTANEOUS at 22:02

## 2019-09-27 RX ADMIN — SODIUM BICARBONATE 650 MG TABLET 650 MG: at 09:06

## 2019-09-27 RX ADMIN — PANTOPRAZOLE SODIUM 40 MG: 40 TABLET, DELAYED RELEASE ORAL at 05:00

## 2019-09-27 RX ADMIN — Medication 1 CAPSULE: at 16:34

## 2019-09-27 RX ADMIN — SODIUM CHLORIDE 75 ML/HR: 0.9 INJECTION, SOLUTION INTRAVENOUS at 10:45

## 2019-09-27 RX ADMIN — INSULIN DETEMIR 10 UNITS: 100 INJECTION, SOLUTION SUBCUTANEOUS at 22:04

## 2019-09-27 NOTE — ASSESSMENT & PLAN NOTE
· Chronic garcia catheter in place  · Continue methenamine hippurate  · Outpatient follow-up with Urology

## 2019-09-27 NOTE — PROGRESS NOTES
Progress Note - Nephrology   Sameer Stevenson [de-identified] y o  male MRN: [id number]  Unit/Bed#: 161-19 Encounter: [id number]    A/P:  1  Acute renal failure likely due to volume depletion              Improved status was are resuscitation, although creatinine is slightly improved over last 24 hours  Continue closely monitor patient's kidney function  I am concerned that the patient has lost substantial baseline kidney function and will but is able to return to his baseline creatinine which in the past has been between 1 4-1 5 mg/dL  Will continue closely monitor  2  Chronic kidney disease stage 3 with baseline creatinine likely around 1 4 - 1 5 mg/dL  3  Probable diabetic nephropathy  4  Proteinuria               Patient has had this issue in the past, continue closely monitor most likely due to diabetes  5  Acidosis                HPZWYNAE with sodium bicarbonate 1300 mg twice a day for now  Patient's bicarbonate is 20 millimole/L this morning, no change to dosing at this time  6  Neuroendocrine tumor               Continue follow-up with Dr William Richmond is continue with Lanreotide injections every 4 weeks for now   =============  7  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             Patient has a chronic Haile catheter, continue with twice a day methenamine hippurate  8  Right hydroureter nephrosis              Resolved, consider re-evaluation of renal anatomy depending on the patient progresses with his kidney function  9  Acute pancreatitis   Continue supportive care, patient was able to start to drink fluids last night in appears to have tolerated well  Continue to advance diet as tolerated and indicated by surgery          Follow up reason for today's visit:  Acute kidney injury/chronic kidney disease/proteinuria/acidosis    Acute pancreatitis without infection or necrosis    Patient Active Problem List   Diagnosis    Weakness generalized    Type 2 diabetes 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, with long-term current use of insulin (HCC)    Essential hypertension    Mixed hyperlipidemia    Cardiac disease    Generalized abdominal mass    Hydroureter    Metabolic acidosis    Iron deficiency anemia secondary to inadequate dietary iron intake    Accelerated hypertension    Liver mass    Neuroendocrine tumor    Acute cystitis without hematuria    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)    Acute renal failure superimposed on stage 4 chronic kidney disease (HCC)    Pressure injury of right heel, unstageable (HCC)    Atherosclerosis of artery of extremity with ulceration (HCC)    Carcinoid syndrome (HCC)    Acute cystitis with hematuria    Chronic indwelling Haile catheter    Moderate protein-calorie malnutrition (HCC)    Biliary sludge    Urinary tract infection due to extended-spectrum beta lactamase (ESBL) producing Escherichia coli    Malignant neuroendocrine neoplasm (HCC)    Generalized abdominal pain    Urinary retention    Acute pancreatitis without infection or necrosis         Subjective:   Acute events overnight, patient has started to drink some liquids and feels somewhat improved with symptoms  No longer has significant abdominal in this time period    Objective:     Vitals: Blood pressure 141/63, pulse 72, temperature 98 °F (36 7 °C), resp  rate 18, height 5' 8" (1 727 m), weight 68 kg (149 lb 14 6 oz), SpO2 100 %  ,Body mass index is 22 79 kg/m²  Weight (last 2 days)     Date/Time   Weight    09/26/19 04:08:39   68 (149 91)    09/25/19 2243   70 9 (156 31)                Intake/Output Summary (Last 24 hours) at 9/27/2019 0733  Last data filed at 9/27/2019 0500  Gross per 24 hour   Intake 1833 33 ml   Output 1625 ml   Net 208 33 ml     I/O last 3 completed shifts: In: 1833 3 [I V :1833 3]  Out: 1985 [Urine:1900; Drains:75; Other:10]    Urethral Catheter Latex 16 Fr   (Active)   Reasons to continue Urinary Catheter  Chronic urinary catheter 9/26/2019  8:07 PM   Goal for Removal N/A- chronic garcia 9/26/2019  8:07 PM   Site Assessment Clean;Skin intact 9/26/2019  8:07 PM   Collection Container Standard drainage bag 9/26/2019  8:07 PM   Securement Method Securing device (Describe) 9/26/2019  8:07 PM   Output (mL) 400 mL 9/27/2019  5:00 AM       Physical Exam: /63   Pulse 72   Temp 98 °F (36 7 °C)   Resp 18   Ht 5' 8" (1 727 m)   Wt 68 kg (149 lb 14 6 oz)   SpO2 100%   BMI 22 79 kg/m²     General Appearance:    Alert, cooperative, no distress, appears stated age   Head:    Normocephalic, without obvious abnormality, atraumatic   Eyes:    Conjunctiva/corneas clear   Ears:    Normal external ears   Nose:   Nares normal, septum midline, mucosa normal, no drainage    or sinus tenderness   Throat:   Lips, mucosa, and tongue normal; teeth and gums normal   Neck:   Supple   Back:     Symmetric, no curvature, ROM normal, no CVA tenderness   Lungs:     Clear to auscultation bilaterally, respirations unlabored   Chest wall:    No tenderness or deformity   Heart:    Regular rate and rhythm, S1 and S2 normal, no murmur, rub   or gallop   Abdomen:     Soft, non-tender, bowel sounds active   Extremities:   Extremities normal, atraumatic, no cyanosis or edema   Skin:   Skin color, texture, turgor normal, no rashes or lesions   Lymph nodes:   Cervical normal   Neurologic:   CNII-XII intact            Lab, Imaging and other studies: I have personally reviewed pertinent labs    CBC:   Lab Results   Component Value Date    WBC 7 03 09/27/2019    HGB 9 2 (L) 09/27/2019    HCT 28 2 (L) 09/27/2019    MCV 91 09/27/2019     09/27/2019    MCH 29 8 09/27/2019    MCHC 32 6 09/27/2019    RDW 14 5 09/27/2019    MPV 10 7 09/27/2019    NRBC 0 09/27/2019     CMP:   Lab Results   Component Value Date    K 4 1 09/27/2019     (H) 09/27/2019    CO2 20 (L) 09/27/2019    BUN 37 (H) 09/27/2019    CREATININE 2 72 (H) 09/27/2019    CALCIUM 8 2 (L) 09/27/2019 AST 16 09/27/2019    ALT 23 09/27/2019    ALKPHOS 186 (H) 09/27/2019    EGFR 21 09/27/2019         Results from last 7 days   Lab Units 09/27/19  0500 09/26/19  0500 09/25/19  2317   POTASSIUM mmol/L 4 1 4 5 4 4   CHLORIDE mmol/L 111* 106 103   CO2 mmol/L 20* 22 21   BUN mg/dL 37* 46* 50*   CREATININE mg/dL 2 72* 2 86* 3 06*   CALCIUM mg/dL 8 2* 8 4 8 6   ALK PHOS U/L 186* 212* 239*   ALT U/L 23 31 45   AST U/L 16 22 31         Phosphorus:   Lab Results   Component Value Date    PHOS 4 2 (H) 09/27/2019     Magnesium:   Lab Results   Component Value Date    MG 1 8 09/27/2019     Urinalysis: No results found for: Henry Domenic, SPECGRAV, PHUR, LEUKOCYTESUR, NITRITE, PROTEINUA, GLUCOSEU, KETONESU, BILIRUBINUR, BLOODU  Ionized Calcium: No results found for: CAION  Coagulation: No results found for: PT, INR, APTT  Troponin:   Lab Results   Component Value Date    TROPONINI 0 02 09/27/2019     ABG: No results found for: PHART, SGP8UEI, PO2ART, MBE2BSO, P1MVJUDS, BEART, SOURCE  Radiology review:     IMAGING  Procedure: Ct Abdomen Pelvis Wo Contrast    Result Date: 9/26/2019  Narrative: CT ABDOMEN AND PELVIS WITHOUT IV CONTRAST INDICATION:   9/6/19 cholecystomy tube; elevated lipase, abdm pain earlier  COMPARISON:  9/15/2019  TECHNIQUE:  CT examination of the abdomen and pelvis was performed without intravenous contrast   Axial, sagittal, and coronal 2D reformatted images were created from the source data and submitted for interpretation  Radiation dose length product (DLP) for this visit:  487 46 mGy-cm   This examination, like all CT scans performed in the [facility], was performed utilizing techniques to minimize radiation dose exposure, including the use of iterative  reconstruction and automated exposure control  Enteric contrast was administered   FINDINGS: ABDOMEN LOWER CHEST:  No clinically significant abnormality identified in the visualized lower chest  LIVER/BILIARY TREE:  Multiple hypodense lesions are seen within the liver concerning for metastasis  Osiel Guise GALLBLADDER:  Percutaneous cholecystostomy tube is again noted  SPLEEN:  Unremarkable  PANCREAS:  Unremarkable  ADRENAL GLANDS:  Unremarkable  KIDNEYS/URETERS:  Unremarkable  No hydronephrosis  STOMACH AND BOWEL:  Unremarkable  APPENDIX:  No findings to suggest appendicitis  ABDOMINOPELVIC CAVITY:  No ascites or free intraperitoneal air  Previously seen partially calcified mass over the mesentery measuring 4 3 x 2 5 x 2 2 cm is again noted and is unchanged in appearance  Previously seen left iliac partially calcified metastatic lymph node measuring 1 cm in short axis is again noted unchanged  VESSELS:  Unremarkable for patient's age  PELVIS REPRODUCTIVE ORGANS:  The prostate is enlarged  URINARY BLADDER:  Decompressed by Haile catheter  Bladder wall is thickened concerning for cystitis  ABDOMINAL WALL/INGUINAL REGIONS:  Unremarkable  OSSEOUS STRUCTURES:  No acute fracture or destructive osseous lesion  Impression: Previously seen partially calcified mass over the mesentery measuring 4 3 x 2 5 x 2 2 cm is again noted and is unchanged in appearance  Previously seen left iliac partially calcified metastatic lymph node measuring 1 cm in short axis is again noted unchanged  No CT evidence for pancreatitis  Percutaneous cholecystostomy tube is again noted  Workstation performed: [id number]     Procedure: Xr Chest 1 View Portable    Result Date: 9/26/2019  Narrative: CHEST INDICATION:   Abdominal pain  COMPARISON:  CT of the abdomen/pelvis dated 9/26/2019  Chest radiograph dated 6/30/2019  EXAM PERFORMED/VIEWS:  XR CHEST PORTABLE FINDINGS: Nodular opacity at the right lung base  Review of CT abdomen /pelvis from same day shows the areas covered in this is confirmed as nipple shadow  Normal lung volumes  No acute consolidation, pneumothorax, or effusion  Heart size normal   Normal pulmonary vasculature   Age-appropriate degenerative changes in the shoulders  No acute osseous findings  Cholecystostomy catheter projects over the right upper quadrant  Impression: No acute cardiopulmonary findings  Note: I agree with the preliminary interpretation by the ED care provider documented in Epic   Workstation performed: [id number]       Current Facility-Administered Medications   Medication Dose Route Frequency    albuterol inhalation solution 2 5 mg  2 5 mg Nebulization Q6H PRN    aluminum-magnesium hydroxide-simethicone (MYLANTA) 200-200-20 mg/5 mL oral suspension 30 mL  30 mL Oral Q6H PRN    amLODIPine (NORVASC) tablet 10 mg  10 mg Oral Daily    aspirin chewable tablet 81 mg  81 mg Oral Daily    b complex-vitamin C-folic acid (NEPHROCAPS) capsule 1 capsule  1 capsule Oral Daily With Dinner    cholecalciferol (VITAMIN D3) tablet 1,000 Units  1,000 Units Oral Daily    dextrose 5 % and sodium chloride 0 9 % infusion  100 mL/hr Intravenous Continuous    dextrose 50 % IV solution 25 mL  25 mL Intravenous PRN    docusate sodium (COLACE) capsule 100 mg  100 mg Oral BID PRN    heparin (porcine) subcutaneous injection 5,000 Units  5,000 Units Subcutaneous Q8H [address]    insulin detemir (LEVEMIR) subcutaneous injection 10 Units  10 Units Subcutaneous HS    insulin lispro (HumaLOG) 100 units/mL subcutaneous injection 1-5 Units  1-5 Units Subcutaneous TID AC    insulin lispro (HumaLOG) 100 units/mL subcutaneous injection 1-5 Units  1-5 Units Subcutaneous HS    methenamine hippurate (HIPREX) tablet 1 g  1 g Oral BID With Meals    ondansetron (ZOFRAN) injection 4 mg  4 mg Intravenous Q6H PRN    pantoprazole (PROTONIX) EC tablet 40 mg  40 mg Oral Early Morning    sodium bicarbonate tablet 650 mg  650 mg Oral BID after meals    tamsulosin (FLOMAX) capsule 0 4 mg  0 4 mg Oral Daily With Dinner     Medications Discontinued During This Encounter   Medication Reason    sodium chloride 0 9 % infusion     colchicine (COLCRYS) tablet 0 6 mg        Robe Bond DO

## 2019-09-27 NOTE — SOCIAL WORK
Per Vaishnavi Mai for pt to return to [facility] when medically ready for dc  Auth # is [id number]   Update is due on Tuesday Oct  1st

## 2019-09-27 NOTE — NURSING NOTE
Pt  Is expressing SOB, pt's SP02 is 98% on room air, VSS  Lungs clear and diminished  PAFREDI Zhu notified  Will continue to monitor closely

## 2019-09-27 NOTE — ASSESSMENT & PLAN NOTE
· Urinary tract infection due to chronic Haile catheter  · Unclear whether this is a true infection versus chronic colonization  · In the setting of abdominal pain and a lactic acidosis, I will initiate ertapenem 500 mg IV every 24 hours

## 2019-09-27 NOTE — PLAN OF CARE
Problem: PHYSICAL THERAPY ADULT  Goal: Performs mobility at highest level of function for planned discharge setting  See evaluation for individualized goals  Outcome: Progressing  Note:   Prognosis: Good  Problem List: Decreased strength, Decreased endurance, Impaired balance, Decreased mobility, Decreased safety awareness  Assessment: Pt  Currently performing bed mobility, tx and ambulation at (SUP ) x 1 level of function  Utilizing RW with fair balance and stability  Excessively slow gait, increased time to complete all tasks d/t weakness and fatigue  Pt is in need of continued activity in PT to improve strength balance endurance mobility transfers and ambulation with return to maximize LOF  From PT/mobility standpoint, recommendation at time of d/c would be return to SNF for STR  in order to promote return to PLOF and independence  Recommendation: Short-term skilled PT      See flowsheet documentation for full assessment

## 2019-09-27 NOTE — PHYSICAL THERAPY NOTE
PT Treatment Note      09/27/19 0932   Restrictions/Precautions   Other Precautions   (Contact/isolation; Fall Risk; Chair Alarm)   Subjective   Subjective c/o weakness and fatigue  Agreeable to therapy   Bed Mobility   Supine to Sit 5  Supervision   Additional items Assist x 1;Bedrails;HOB elevated; Increased time required   Transfers   Sit to Stand 5  Supervision   Additional items Bedrails   Stand to Sit 5  Supervision   Additional items Armrests   Stand pivot 5  Supervision   Additional items Verbal cues   Ambulation/Elevation   Gait pattern Forward Flexion; Short stride   Gait Assistance 5  Supervision   Additional items Assist x 1;Verbal cues   Assistive Device Rolling walker   Distance 80'   Balance   Ambulatory Fair   Endurance Deficit   Endurance Deficit Yes   Activity Tolerance   Activity Tolerance Patient limited by fatigue   Exercises   Heelslides 15 reps   Hip Flexion 20 reps   Hip Abduction 20 reps   Hip Adduction 20 reps   Knee AROM Long Arc Quad 20 reps   Ankle Pumps 20 reps   Assessment   Prognosis Good   Problem List Decreased strength;Decreased endurance; Impaired balance;Decreased mobility; Decreased safety awareness   Assessment Pt  Currently performing bed mobility, tx and ambulation at (SUP ) x 1 level of function  Utilizing RW with fair balance and stability  Excessively slow gait, increased time to complete all tasks d/t weakness and fatigue  Pt is in need of continued activity in PT to improve strength balance endurance mobility transfers and ambulation with return to maximize LOF  From PT/mobility standpoint, recommendation at time of d/c would be return to SNF for STR  in order to promote return to PLOF and independence  Goals   LTG Expiration Date 10/10/19   Plan   Treatment/Interventions Functional transfer training;LE strengthening/ROM; Therapeutic exercise; Endurance training;Bed mobility;Gait training   Progress Slow progress, decreased activity tolerance   Recommendation   Recommendation Short-term skilled PT   Pt  OOB in chair   with call bell within reach  and alarm on at end of PT session  Discussed with Arina Ulloa, PT today's treatment and patient's current level of function for care coordination

## 2019-09-27 NOTE — SOCIAL WORK
Clinical sent over to [facility] in the event pt is medically ready over the weekend to return to SNF on dc  CM notified Flex Brewer at [facility] who stated if pt is medically ready over the weekend as long as we have authorization pt can return and he is in room [address] on the [address]

## 2019-09-27 NOTE — PROGRESS NOTES
Progress Note - General Surgery   Aldair Penny [de-identified] y o  male MRN: [id number]  Unit/Bed#: 923-51 Encounter: [id number]    Assessment:  Hospital day 1 with acute on chronic renal failure secondary to acute pancreatitis and dehydration  Patient reports feeling modestly better this morning  He tolerated the clear liquids  On physical examination he is awake alert and oriented  He is competent reliable historian  He is afebrile stable vital signs  His abdomen is soft and nontender to palpation in 4 quadrants  The percutaneous cholecystostomy is draining a clear bile  Lab studies were reviewed in the computer record    Plan:  Patient appears clinically stable from a surgical perspective with regards to the acute pancreatitis  There are no signs of complications related to the pancreatitis  There is no indication for surgical intervention at the present time  We will continue to follow the patient closely with medical service  A repeat lipase level will be ordered for the morning in order to help guide decision making regarding advancement of the diet  Subjective/Objective   Chief Complaint:  Acute pancreatitis    Subjective:  Patient reports modest improvement in symptoms    Objective:  Afebrile stable vital signs  Physical examination as described above  Blood pressure 141/63, pulse 72, temperature 98 °F (36 7 °C), resp  rate 18, height 5' 8" (1 727 m), weight 68 kg (149 lb 14 6 oz), SpO2 100 %  ,Body mass index is 22 79 kg/m²        Intake/Output Summary (Last 24 hours) at 9/27/2019 1124  Last data filed at 9/27/2019 0852  Gross per 24 hour   Intake 2583 33 ml   Output 1625 ml   Net 958 33 ml       Invasive Devices     Peripheral Intravenous Line            Peripheral IV 09/25/19 Right Forearm 1 day          Drain            Urethral Catheter Latex 16 Fr  90 days    Closed/Suction Drain Right RUQ Bulb 10 2 Fr  20 days    Closed/Suction Drain Lateral RLQ Bulb 1 day                Lab, Imaging and other studies:I have personally reviewed pertinent lab results

## 2019-09-27 NOTE — PROGRESS NOTES
Progress Note - Aldair Penny 1940, [de-identified] y o  male MRN: [id number]    Unit/Bed#: 296-94 Encounter: [id number]    Primary Care Provider: Ronnell Nguyễn DO   Date and time admitted to hospital: 9/25/2019 10:37 PM        * Acute pancreatitis without infection or necrosis  Assessment & Plan  · Improving  · The lipase level is now normal  · Advanced the patient's diet to a solid food diet  · NSS IV fluids  · Serial abdominal examinations      Urinary tract infection due to extended-spectrum beta lactamase (ESBL) producing Escherichia coli  Assessment & Plan  · Urinary tract infection due to chronic Haile catheter  · Unclear whether this is a true infection versus chronic colonization  · In the setting of abdominal pain and a lactic acidosis, I will initiate ertapenem 500 mg IV every 24 hours    Acute kidney injury ([facility] [address] )  Assessment & Plan  · Acute kidney injury was present on admission  · Baseline serum creatinine of 1 4-1 5 mg/dl  · Continue NSS IV fluids  · Avoid all nephrotoxic agents  · Maintain chronic Haile catheter for chronic urinary retention  · The patient was seen in consultation by Nephrology  · Serial laboratory testing to monitor the patient's renal function and electrolytes        CKD (chronic kidney disease) stage 3, GFR 30-59 ml/min ([facility])  Assessment & Plan  · Acute kidney injury was present on admission  · Baseline serum creatinine of 1 4-1 5 mg/dl  · Continue NSS IV fluids  · Avoid all nephrotoxic agents  · Maintain chronic Haile catheter for chronic urinary retention  · The patient was seen in consultation by Nephrology  · Serial laboratory testing to monitor the patient's renal function and electrolytes      Lactic acidosis  Assessment & Plan  · Resolved with IV fluids    Cholecystostomy tube dysfunction  Assessment & Plan  · Initially presented to the emergency department with an obstructed cholecystostomy tube  · The patient was seen in consultation by General Surgery  · The cholecystostomy tube obstruction is now resolved and is functioning appropriately          Urinary retention  Assessment & Plan  · Chronic garcia catheter in place  · Continue methenamine hippurate  · Outpatient follow-up with Urology    Moderate protein-calorie malnutrition [facility])  Assessment & Plan  Malnutrition Findings:        Malnutrition related to prolonged inadequate intake as evidenced by subcutaneous fat loss in bilateral upper extremities and orbital/cheek region, severe temporal wasting, clavicle/sternum visible, sunken orbitals, inability to meet nutrient needs by mouth  BMI Findings: Body mass index is 22 79 kg/m²  · Dietitian/nutritionist consultation  · Continue nutritional supplements      Chronic indwelling Garcia catheter  Assessment & Plan  Secondary to urinary retention  Maintain Garcia catheter  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 [facility])  Assessment & Plan  · Outpatient follow-up with Dr April Ying (Hematology/Oncology) for lanreotide injections every 4 weeks      Type 2 diabetes 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, with long-term current use of insulin ([facility] [address] )  Assessment & Plan  Lab Results   Component Value Date    HGBA1C 8 1 (H) 06/14/2019       No results for input(s): POCGLU in the last 72 hours  Blood Sugar Average: Last 72 hrs:     · Continue levemir 10 Units SQ QHS  · Hypoglycemia protocol  · Follow the blood glucose trend        VTE Pharmacologic Prophylaxis:   Pharmacologic: Heparin  Mechanical VTE Prophylaxis in Place: Yes    Patient Centered Rounds: I have performed bedside rounds with nursing staff today  Time Spent for Care: 30 minutes  More than 50% of total time spent on counseling and coordination of care as described above      Current Length of Stay: 1 day(s)    Current Patient Status: Inpatient   Certification Statement: The patient will continue to require additional inpatient hospital stay due to the need for continuous IV fluids, for IV antibiotics, and for serial laboratory testing to monitor his renal function  Code Status: Level 1 - Full Code      Subjective: The patient was seen and examined  The patient is doing better  No chest pain  No shortness of breath  No abdominal pain  No nausea or vomiting  Objective:     Vitals:   Temp (24hrs), Av 3 °F (36 8 °C), Min:98 °F (36 7 °C), Max:98 7 °F (37 1 °C)    Temp:  [98 °F (36 7 °C)-98 7 °F (37 1 °C)] 98 °F (36 7 °C)  HR:  [72-89] 72  Resp:  [18-20] 18  BP: (116-141)/(60-63) 141/63  SpO2:  [97 %-100 %] 100 %  Body mass index is 22 79 kg/m²  Input and Output Summary (last 24 hours):        Intake/Output Summary (Last 24 hours) at 2019 1410  Last data filed at 2019 0852  Gross per 24 hour   Intake 1730 ml   Output 1625 ml   Net 105 ml       Physical Exam:     Physical Exam  General:  NAD, awake, confused at times, follows commands  HEENT:  NC/AT, mucous membranes moist  Neck:  Supple, No JVP elevation  CV:  + S1, + S2, RRR  Pulm:  Lung fields are CTA bilaterally  Abd:  Soft, Non-tender, Non-distended  Ext:  No clubbing/cyanosis/edema  Skin:  No rashes      Additional Data:    Labs:    Results from last 7 days   Lab Units 19  0500   WBC Thousand/uL 7 03   HEMOGLOBIN g/dL 9 2*   HEMATOCRIT % 28 2*   PLATELETS Thousands/uL 177   NEUTROS PCT % 70   LYMPHS PCT % 19   MONOS PCT % 8   EOS PCT % 1     Results from last 7 days   Lab Units 19  0500   SODIUM mmol/L 142   POTASSIUM mmol/L 4 1   CHLORIDE mmol/L 111*   CO2 mmol/L 20*   BUN mg/dL 37*   CREATININE mg/dL 2 72*   ANION GAP mmol/L 11   CALCIUM mg/dL 8 2*   ALBUMIN g/dL 2 4*   TOTAL BILIRUBIN mg/dL 0 40   ALK PHOS U/L 186*   ALT U/L 23   AST U/L 16   GLUCOSE RANDOM mg/dL 160*     Results from last 7 days   Lab Units 19  0500   INR  1 01     Results from last 7 days   Lab Units 19  1107 19  0716 19  0522 19  2025 19  1741 19  1142 19  0839   POC GLUCOSE mg/dl 262* 144* 160* 146* 128 163* 134         Results from last 7 days   Lab Units 09/27/19  0500 09/26/19  0302 09/25/19  2317   LACTIC ACID mmol/L 1 7 1 9 2 5*   PROCALCITONIN ng/ml 0 09  --   --            * I Have Reviewed All Lab Data Listed Above  * Additional Pertinent Lab Tests Reviewed: Laura Cruz Admission Reviewed      Recent Cultures (last 7 days):     Results from last 7 days   Lab Units 09/26/19  0302 09/25/19  2317   BLOOD CULTURE  No Growth at 24 hrs  No Growth at 24 hrs         Last 24 Hours Medication List:     Current Facility-Administered Medications:  acetaminophen 650 mg Oral Q6H PRN Guanakito Manley DO    albuterol 2 5 mg Nebulization Q6H PRN Ginger Kessler MD    aluminum-magnesium hydroxide-simethicone 30 mL Oral Q6H PRN Ginger Kessler MD    amLODIPine 10 mg Oral Daily Ginger Kessler MD    aspirin 81 mg Oral Daily Ginger Kessler MD    b complex-vitamin C-folic acid 1 capsule Oral Daily With Berna Evans MD    cholecalciferol 1,000 Units Oral Daily Ginger Kessler MD    dextrose 25 mL Intravenous PRN Guanakito Manley DO    docusate sodium 100 mg Oral BID PRN Ginger Kessler MD    ertapenem 500 mg Intravenous Q24H Guanakito Manley DO    heparin (porcine) 5,000 Units Subcutaneous [facility] Ginger Kessler MD    insulin detemir 10 Units Subcutaneous HS Ginger Kessler MD    insulin lispro 1-5 Units Subcutaneous TID AC Ginger Kessler MD    insulin lispro 1-5 Units Subcutaneous HS Ginger Kessler MD    methenamine hippurate 1 g Oral BID With Meals Ginger Kessler MD    ondansetron 4 mg Intravenous Q6H PRN Ginger Kessler MD    pantoprazole 40 mg Oral Early Morning Ginger Kessler MD    sodium bicarbonate 650 mg Oral BID after meals Ginger Kessler MD    sodium chloride 75 mL/hr Intravenous Continuous Guanakito Manley DO Last Rate: 75 mL/hr (09/27/19 1045)   tamsulosin 0 4 mg Oral Daily With Atul Kelly MD         Today, Patient Was Seen By: Da Pope DO    ** Please Note: Dictation voice to text software may have been used in the creation of this document   **

## 2019-09-27 NOTE — OCCUPATIONAL THERAPY NOTE
OT Note     09/27/19 1039   Restrictions/Precautions   Other Precautions Contact/isolation; Fall Risk; Chair Alarm   ADL   Where Assessed Chair   Grooming Assistance 5  Supervision/Setup   Grooming Deficit Setup   Grooming Comments COmpletes shaving, oral care and grooming   UB Bathing Assistance 5  Supervision/Setup   UB Bathing Comments A with back   LB Bathing Assistance 4  Minimal Assistance   LB Bathing Deficit Buttocks   LB Bathing Comments Declines attempt feet secondary increased SOB on attempt, wife provides A with same   UB Dressing Assistance 4  Minimal Assistance   UB Dressing Comments To manage lines and faseners   LB Dressing Assistance 4  Minimal Assistance   LB Dressing Deficit   (Don L sock)   LB Dressing Comments Doffs, B non skids, dons R without difficulty   Transfers   Sit to Stand 5  Supervision   Additional items Armrests   Stand to Sit 5  Supervision   Additional items Armrests   Activity Tolerance   Activity Tolerance Patient tolerated treatment well   Assessment   Assessment Pt with multiple deficits affecting overall functional performance as per initial eval   Completes self care with setup?s requiring A with buttocks and donning L sock only, wife reports she provides A with same as needed  Cues to rest as needed thru activity  Spoke with OTR who is in agreement to continue active OT services thru hospitalization in attempt to facilitae return to highest LOF  Plan   Goal Expiration Date 10/10/19   OT Treatment Day 1   Recommendation   OT Discharge Recommendation Short Term Rehab   Remains seated recliner at bedside  All needs in reach

## 2019-09-27 NOTE — PLAN OF CARE
Problem: OCCUPATIONAL THERAPY ADULT  Goal: Performs self-care activities at highest level of function for planned discharge setting  See evaluation for individualized goals  Description  Treatment Interventions: ADL retraining, Functional transfer training, UE strengthening/ROM, Endurance training, Patient/family training, Equipment evaluation/education, Activityengagement          See flowsheet documentation for full assessment, interventions and recommendations  Outcome: Progressing  Note:   Limitation: Decreased ADL status, Decreased UE strength, Decreased endurance, Decreased self-care trans, Decreased high-level ADLs     Assessment: Pt with multiple deficits affecting overall functional performance as per initial eval   Completes self care with setup?s requiring A with buttocks and donning L sock only, wife reports she provides A with same as needed  Cues to rest as needed thru activity  Spoke with OTR who is in agreement to continue active OT services thru hospitalization in attempt to facilitae return to highest LOF       OT Discharge Recommendation: Short Term Rehab

## 2019-09-27 NOTE — SPEECH THERAPY NOTE
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)    Acute renal failure superimposed on stage 4 chronic kidney disease (HCC)    Chronic indwelling Haile catheter    Moderate protein-calorie malnutrition [facility])    Urinary retention    Past Medical History  Past Medical History:   Diagnosis Date    Acute pancreatitis without infection or necrosis 9/26/2019    BPH (benign prostatic hyperplasia)     Cancer (HCC)     liver cancer    Cardiac disease     Coronary artery disease     Diabetes mellitus ([address] )     DJD (degenerative joint disease)     Gait disturbance     Generalized weakness     GERD (gastroesophageal reflux disease)     Gout     History of transfusion     Hyperlipidemia     Hypertension     Pressure injury of skin     Renal disorder     Type 2 diabetes 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, with long-term current use of insulin ([facility] [address] ) 1/23/2019     Past Surgical History  Past Surgical History:   Procedure Laterality Date    CORONARY ANGIOPLASTY WITH STENT PLACEMENT      IR IMAGE GUIDED BIOPSY/ASPIRATION LIVER  1/24/2019    IR TUBE PLACEMENT ROQUE  9/6/2019        Bedside Swallow Evaluation:    Summary:  Oropharyngeal swallow appears WFLs  No significant difficulty or overt s/s aspiration observed today      Recommendations:  Diet: regular  Liquid: thin  Meds: as tolerated    Therapy Prognosis: good  Prognosis considerations: age, support, prior level  Frequency: f/u x 1 to assure diet tolerance across a full meal       Patient's goal: to go home              HPI:  From H&P:  Patient initially sent to ER from [address] after developing acute epigastric pain (which resolved with 1 tablet percocet) and obstructed cholecystostomy tube  Cholecystostomy tube flushed in ER and patent  Draining large volume of dark bile  Problem resolved  ER evaluation revealed lipase 1155, was 138 last week  CT shows known carcinoma with hepatobiliary metastases, but pancreas unremarkable  Vital signs unremarkable, WBC normal   Abdomen nontender on exam   Initial lactate mildly elevated, but normalized with IV fluids  Will admit for IV crystalloids, NPO pending clinical course and surgical evaluation  Will hold antibiotics at this time, as no apparent infection  Consulted General Surgery, GI, and Nutrition  9/25/19 CXR:  No acute cardiopulmonary findings  Reason for consult:  R/o aspiration  Determine safest and least restrictive diet    Precautions:  Contact    Current diet:  Regular with thin  Premorbid diet[de-identified]  Regular diet at home; however, admitted here from [facility] where wife and pt report all food was ground  They verbalized frustration with modified diet  Previous VBS:  none  O2 requirement:  Room air  Voice/Speech:  wfls  Social:  Currently from [facility]; resides at home with his wife  Follows commands:  yes                        Cognitive Status:  Awake, alert, conversive  Oral [facility] exam:  Dentition: natural; missing some upper back molars  Labial strength and ROM: [facility]  Lingual strength and ROM: [facility]  Mandibular strength and ROM: [facility]    Items administered:  Puree, jorge l crackers with peanut butter, thin water via straw         Oral stage:  Lip closure: good  Mastication: effective  Bolus formation: adequate  Bolus control: good  Transfer: adequate  Oral residue: cleared independently  Pocketing: no    Pharyngeal stage:  Swallow promptness: appeared prompt  Laryngeal rise: adequate per palpation  Wet voice: no  Throat clear: no  Cough: o  Secondary swallows: no  Audible swallows: o  No overt s/s aspiration    Esophageal stage:  No s/s reported  H/o GERD    Goal(s):  Pt will tolerate least restrictive diet w/out s/s aspiration or oral/pharyngeal difficulties

## 2019-09-28 PROBLEM — E83.42 HYPOMAGNESEMIA: Status: ACTIVE | Noted: 2019-09-28

## 2019-09-28 LAB
ALBUMIN SERPL BCP-MCNC: 2.2 G/DL (ref 3.5–5)
ALP SERPL-CCNC: 172 U/L (ref 46–116)
ALT SERPL W P-5'-P-CCNC: 23 U/L (ref 12–78)
ANION GAP SERPL CALCULATED.3IONS-SCNC: 11 MMOL/L (ref 4–13)
AST SERPL W P-5'-P-CCNC: 14 U/L (ref 5–45)
BASOPHILS # BLD AUTO: 0.04 THOUSANDS/ΜL (ref 0–0.1)
BASOPHILS NFR BLD AUTO: 1 % (ref 0–1)
BILIRUB SERPL-MCNC: 0.4 MG/DL (ref 0.2–1)
BUN SERPL-MCNC: 27 MG/DL (ref 5–25)
CA-I BLD-SCNC: 1.2 MMOL/L (ref 1.12–1.32)
CALCIUM SERPL-MCNC: 7.9 MG/DL (ref 8.3–10.1)
CHLORIDE SERPL-SCNC: 113 MMOL/L (ref 100–108)
CO2 SERPL-SCNC: 19 MMOL/L (ref 21–32)
CREAT SERPL-MCNC: 2.53 MG/DL (ref 0.6–1.3)
EOSINOPHIL # BLD AUTO: 0.07 THOUSAND/ΜL (ref 0–0.61)
EOSINOPHIL NFR BLD AUTO: 1 % (ref 0–6)
ERYTHROCYTE [DISTWIDTH] IN BLOOD BY AUTOMATED COUNT: 14.4 % (ref 11.6–15.1)
GFR SERPL CREATININE-BSD FRML MDRD: 23 ML/MIN/1.73SQ M
GLUCOSE SERPL-MCNC: 123 MG/DL (ref 65–140)
GLUCOSE SERPL-MCNC: 213 MG/DL (ref 65–140)
GLUCOSE SERPL-MCNC: 215 MG/DL (ref 65–140)
GLUCOSE SERPL-MCNC: 252 MG/DL (ref 65–140)
GLUCOSE SERPL-MCNC: 85 MG/DL (ref 65–140)
HCT VFR BLD AUTO: 27.3 % (ref 36.5–49.3)
HGB BLD-MCNC: 8.9 G/DL (ref 12–17)
IMM GRANULOCYTES # BLD AUTO: 0.02 THOUSAND/UL (ref 0–0.2)
IMM GRANULOCYTES NFR BLD AUTO: 0 % (ref 0–2)
LIPASE SERPL-CCNC: 383 U/L (ref 73–393)
LYMPHOCYTES # BLD AUTO: 1.21 THOUSANDS/ΜL (ref 0.6–4.47)
LYMPHOCYTES NFR BLD AUTO: 16 % (ref 14–44)
MAGNESIUM SERPL-MCNC: 1.5 MG/DL (ref 1.6–2.6)
MCH RBC QN AUTO: 29.6 PG (ref 26.8–34.3)
MCHC RBC AUTO-ENTMCNC: 32.6 G/DL (ref 31.4–37.4)
MCV RBC AUTO: 91 FL (ref 82–98)
MONOCYTES # BLD AUTO: 0.59 THOUSAND/ΜL (ref 0.17–1.22)
MONOCYTES NFR BLD AUTO: 8 % (ref 4–12)
NEUTROPHILS # BLD AUTO: 5.76 THOUSANDS/ΜL (ref 1.85–7.62)
NEUTS SEG NFR BLD AUTO: 74 % (ref 43–75)
NRBC BLD AUTO-RTO: 0 /100 WBCS
PHOSPHATE SERPL-MCNC: 3.4 MG/DL (ref 2.3–4.1)
PLATELET # BLD AUTO: 171 THOUSANDS/UL (ref 149–390)
PMV BLD AUTO: 10.6 FL (ref 8.9–12.7)
POTASSIUM SERPL-SCNC: 4.2 MMOL/L (ref 3.5–5.3)
PROCALCITONIN SERPL-MCNC: 0.12 NG/ML
PROT SERPL-MCNC: 5.7 G/DL (ref 6.4–8.2)
RBC # BLD AUTO: 3.01 MILLION/UL (ref 3.88–5.62)
SODIUM SERPL-SCNC: 143 MMOL/L (ref 136–145)
WBC # BLD AUTO: 7.69 THOUSAND/UL (ref 4.31–10.16)

## 2019-09-28 PROCEDURE — 84145 PROCALCITONIN (PCT): CPT | Performed by: INTERNAL MEDICINE

## 2019-09-28 PROCEDURE — 83735 ASSAY OF MAGNESIUM: CPT | Performed by: INTERNAL MEDICINE

## 2019-09-28 PROCEDURE — 82948 REAGENT STRIP/BLOOD GLUCOSE: CPT

## 2019-09-28 PROCEDURE — 99232 SBSQ HOSP IP/OBS MODERATE 35: CPT | Performed by: SPECIALIST

## 2019-09-28 PROCEDURE — 85025 COMPLETE CBC W/AUTO DIFF WBC: CPT | Performed by: SURGERY

## 2019-09-28 PROCEDURE — 84100 ASSAY OF PHOSPHORUS: CPT | Performed by: INTERNAL MEDICINE

## 2019-09-28 PROCEDURE — 99232 SBSQ HOSP IP/OBS MODERATE 35: CPT | Performed by: PHYSICIAN ASSISTANT

## 2019-09-28 PROCEDURE — 99232 SBSQ HOSP IP/OBS MODERATE 35: CPT | Performed by: INTERNAL MEDICINE

## 2019-09-28 PROCEDURE — 82330 ASSAY OF CALCIUM: CPT | Performed by: INTERNAL MEDICINE

## 2019-09-28 PROCEDURE — 80053 COMPREHEN METABOLIC PANEL: CPT | Performed by: SURGERY

## 2019-09-28 PROCEDURE — 92526 ORAL FUNCTION THERAPY: CPT

## 2019-09-28 PROCEDURE — 83690 ASSAY OF LIPASE: CPT | Performed by: SURGERY

## 2019-09-28 RX ORDER — MAGNESIUM SULFATE HEPTAHYDRATE 40 MG/ML
2 INJECTION, SOLUTION INTRAVENOUS ONCE
Status: COMPLETED | OUTPATIENT
Start: 2019-09-28 | End: 2019-09-28

## 2019-09-28 RX ADMIN — Medication 1 CAPSULE: at 15:45

## 2019-09-28 RX ADMIN — AMLODIPINE BESYLATE 10 MG: 10 TABLET ORAL at 09:19

## 2019-09-28 RX ADMIN — SODIUM BICARBONATE 650 MG TABLET 650 MG: at 17:10

## 2019-09-28 RX ADMIN — TAMSULOSIN HYDROCHLORIDE 0.4 MG: 0.4 CAPSULE ORAL at 15:45

## 2019-09-28 RX ADMIN — ERTAPENEM SODIUM 500 MG: 1 INJECTION, POWDER, LYOPHILIZED, FOR SOLUTION INTRAMUSCULAR; INTRAVENOUS at 15:37

## 2019-09-28 RX ADMIN — HEPARIN SODIUM 5000 UNITS: 5000 INJECTION INTRAVENOUS; SUBCUTANEOUS at 05:31

## 2019-09-28 RX ADMIN — PANTOPRAZOLE SODIUM 40 MG: 40 TABLET, DELAYED RELEASE ORAL at 05:31

## 2019-09-28 RX ADMIN — INSULIN LISPRO 1 UNITS: 100 INJECTION, SOLUTION INTRAVENOUS; SUBCUTANEOUS at 15:49

## 2019-09-28 RX ADMIN — METHENAMINE HIPPURATE 1 G: 1 TABLET ORAL at 07:59

## 2019-09-28 RX ADMIN — INSULIN DETEMIR 10 UNITS: 100 INJECTION, SOLUTION SUBCUTANEOUS at 21:07

## 2019-09-28 RX ADMIN — ASPIRIN 81 MG 81 MG: 81 TABLET ORAL at 09:19

## 2019-09-28 RX ADMIN — VITAMIN D, TAB 1000IU (100/BT) 1000 UNITS: 25 TAB at 09:19

## 2019-09-28 RX ADMIN — HEPARIN SODIUM 5000 UNITS: 5000 INJECTION INTRAVENOUS; SUBCUTANEOUS at 21:07

## 2019-09-28 RX ADMIN — SODIUM BICARBONATE 650 MG TABLET 650 MG: at 07:59

## 2019-09-28 RX ADMIN — MAGNESIUM SULFATE HEPTAHYDRATE 2 G: 40 INJECTION, SOLUTION INTRAVENOUS at 09:19

## 2019-09-28 RX ADMIN — METHENAMINE HIPPURATE 1 G: 1 TABLET ORAL at 15:45

## 2019-09-28 RX ADMIN — INSULIN LISPRO 2 UNITS: 100 INJECTION, SOLUTION INTRAVENOUS; SUBCUTANEOUS at 11:40

## 2019-09-28 RX ADMIN — INSULIN LISPRO 1 UNITS: 100 INJECTION, SOLUTION INTRAVENOUS; SUBCUTANEOUS at 21:07

## 2019-09-28 RX ADMIN — SODIUM CHLORIDE 75 ML/HR: 0.9 INJECTION, SOLUTION INTRAVENOUS at 06:09

## 2019-09-28 RX ADMIN — HEPARIN SODIUM 5000 UNITS: 5000 INJECTION INTRAVENOUS; SUBCUTANEOUS at 15:38

## 2019-09-28 NOTE — PROGRESS NOTES
Progress Note - General Surgery   Anton Das [de-identified] y o  male MRN: [id number]  Unit/Bed#: 413-01 Encounter: [id number]    Assessment:  Patient sitting up in bed, eating lunch  No complaints of abdominal pain  Appears more robust    Plan:  Continue to advance diet  Leave cholecystostomy tube to bulb suction    Subjective/Objective   Chief Complaint: Feeling well this afternoon    Subjective: Looks stronger, and feels better    Objective:     Blood pressure 125/53, pulse 88, temperature 98 4 °F (36 9 °C), resp  rate 18, height 5' 8" (1 727 m), weight 68 kg (149 lb 14 6 oz), SpO2 100 %  ,Body mass index is 22 79 kg/m²  Intake/Output Summary (Last 24 hours) at 9/28/2019 1207  Last data filed at 9/28/2019 1114  Gross per 24 hour   Intake 1742 5 ml   Output 2110 ml   Net -367 5 ml       Invasive Devices     Peripheral Intravenous Line            Peripheral IV 09/25/19 Right Forearm 2 days          Drain            Urethral Catheter Latex 16 Fr  91 days    Closed/Suction Drain Right RUQ Bulb 10 2 Fr  21 days    Closed/Suction Drain Lateral RLQ Bulb 2 days                Physical Exam:   Abdomen soft, bowel sounds present  BONILLA bulb with clear bile, no drainage around tube  Lab, Imaging and other studies:  CBC:   Lab Results   Component Value Date    WBC 7 69 09/28/2019    HGB 8 9 (L) 09/28/2019    HCT 27 3 (L) 09/28/2019    MCV 91 09/28/2019     09/28/2019    MCH 29 6 09/28/2019    MCHC 32 6 09/28/2019    RDW 14 4 09/28/2019    MPV 10 6 09/28/2019    NRBC 0 09/28/2019   , CMP:   Lab Results   Component Value Date    SODIUM 143 09/28/2019    K 4 2 09/28/2019     (H) 09/28/2019    CO2 19 (L) 09/28/2019    BUN 27 (H) 09/28/2019    CREATININE 2 53 (H) 09/28/2019    CALCIUM 7 9 (L) 09/28/2019    AST 14 09/28/2019    ALT 23 09/28/2019    ALKPHOS 172 (H) 09/28/2019    EGFR 23 09/28/2019   Results for Sadaf Moya (MRN [id number]) as of 9/28/2019 12:10   Ref   Range 9/28/2019 05:10   Lipase Latest Ref Range: 73 - 393 u/L 383   VTE Pharmacologic Prophylaxis: Heparin  VTE Mechanical Prophylaxis: sequential compression device

## 2019-09-28 NOTE — SPEECH THERAPY NOTE
F/U to swallowing evaluation completed  Records reviewed to begin  Diet advanced to CCD 3 regular textured food/  Pt noted and reported to tolerate diet with no s/s oral or pharyngeal dysphagia    No additional SLP follow-up appears indicated (signing off)

## 2019-09-28 NOTE — ASSESSMENT & PLAN NOTE
· Improving  · The lipase level is now normal  · Patient is tolerating a solid food diet  · Will discontinue NSS IV fluids today 9/28  · Serial abdominal examinations

## 2019-09-28 NOTE — ASSESSMENT & PLAN NOTE
· Acute kidney injury was present on admission  · Baseline serum creatinine of 1 4-1 5 mg/dl  · Discontinue NSS IV fluids per nephrology recommendations     · Avoid all nephrotoxic agents  · Maintain chronic Haile catheter for chronic urinary retention  · The patient was seen in consultation by Nephrology  · Serial laboratory testing to monitor the patient's renal function and electrolytes

## 2019-09-28 NOTE — ASSESSMENT & PLAN NOTE
Lab Results   Component Value Date    HGBA1C 8 1 (H) 06/14/2019       Recent Labs     09/27/19  1636 09/27/19  2159 09/28/19  0752 09/28/19  1134   POCGLU 166* 167* 85 252*       Blood Sugar Average: Last 72 hrs:  (P) 164 [id number]   · Continue levemir 10 Units SQ QHS  · Hypoglycemia protocol  · Follow the blood glucose trend

## 2019-09-28 NOTE — ASSESSMENT & PLAN NOTE
· Acute kidney injury was present on admission  · Baseline serum creatinine of 1 4-1 5 mg/dl  · Discontinue NSS IV fluids per nephrology given patient is eating and drinking well     · Avoid all nephrotoxic agents  · Maintain chronic Haile catheter for chronic urinary retention  · The patient was seen in consultation by Nephrology  · Serial laboratory testing to monitor the patient's renal function and electrolytes

## 2019-09-28 NOTE — PLAN OF CARE
Problem: Potential for Falls  Goal: Patient will remain free of falls  Description  INTERVENTIONS:  - Assess patient frequently for physical needs  -  Identify cognitive and physical deficits and behaviors that affect risk of falls  -  [place] fall precautions as indicated by assessment  High fall risk    - Educate patient/family on patient safety including physical limitations  - Instruct patient to call for assistance with activity based on assessment  - Modify environment to reduce risk of injury  - Consider OT/PT consult to assist with strengthening/mobility   Outcome: Progressing     Problem: Prexisting or High Potential for Compromised Skin Integrity  Goal: Skin integrity is maintained or improved  Description  INTERVENTIONS:  - Identify patients at risk for skin breakdown  - Assess and monitor skin integrity  - Assess and monitor nutrition and hydration status  - Monitor labs   - Assess for incontinence   - Turn and reposition patient  - Assist with mobility/ambulation  - Relieve pressure over bony prominences  - Avoid friction and shearing  - Provide appropriate hygiene as needed including keeping skin clean and dry  - Evaluate need for skin moisturizer/barrier cream  - Collaborate with interdisciplinary team   - Patient/family teaching  - Consider wound care consult   Outcome: Progressing     Problem: Nutrition/Hydration-ADULT  Goal: Nutrient/Hydration intake appropriate for improving, restoring or maintaining nutritional needs  Description  Monitor and assess patient's nutrition/hydration status for malnutrition  Collaborate with interdisciplinary team and initiate plan and interventions as ordered  Monitor patient's weight and dietary intake as ordered or per policy  Utilize nutrition screening tool and intervene as necessary  Determine patient's food preferences and provide high-protein, high-caloric foods as appropriate       INTERVENTIONS:  - Monitor oral intake, urinary output, labs, and treatment plans  - Assess nutrition and hydration status and recommend course of action  - Evaluate amount of meals eaten  - Assist patient with eating if necessary   - Allow adequate time for meals  - Recommend/ encourage appropriate diets, oral nutritional supplements, and vitamin/mineral supplements  - Order, calculate, and assess calorie counts as needed  - Recommend, monitor, and adjust tube feedings and TPN/PPN based on assessed needs  - Assess need for intravenous fluids  - Provide specific nutrition/hydration education as appropriate  - Include patient/family/caregiver in decisions related to nutrition  Outcome: Progressing     Problem: PAIN - ADULT  Goal: Verbalizes/displays adequate comfort level or baseline comfort level  Description  Interventions:  - Encourage patient to monitor pain and request assistance  - Assess pain using appropriate pain scale  - Administer analgesics based on type and severity of pain and evaluate response  - Implement non-pharmacological measures as appropriate and evaluate response  - Consider cultural and social influences on pain and pain management  - Notify physician/advanced practitioner if interventions unsuccessful or patient reports new pain  Outcome: Progressing     Problem: INFECTION - ADULT  Goal: Absence or prevention of progression during hospitalization  Description  INTERVENTIONS:  - Assess and monitor for signs and symptoms of infection  - Monitor lab/diagnostic results  - Monitor all insertion sites, i e  indwelling lines, tubes, and drains  - Administer medications as ordered  - Instruct and encourage patient and family to use good hand hygiene technique  - Identify and instruct in appropriate isolation precautions for identified infection/condition    Outcome: Progressing     Problem: SAFETY ADULT  Goal: Maintain or return to baseline ADL function  Description  INTERVENTIONS:  -  Assess patient's ability to carry out ADLs; assess patient's baseline for ADL function and identify physical deficits which impact ability to perform ADLs (bathing, care of mouth/teeth, toileting, grooming, dressing, etc )  - Assess/evaluate cause of self-care deficits   - Assess range of motion  - Assess patient's mobility; develop plan if impaired  - Assess patient's need for assistive devices and provide as appropriate  - Encourage maximum independence but intervene and supervise when necessary  - Involve family in performance of ADLs  - Assess for home care needs following discharge   - Consider OT consult to assist with ADL evaluation and planning for discharge  - Provide patient education as appropriate  Outcome: Progressing  Goal: Maintain or return mobility status to optimal level  Description  INTERVENTIONS:  - Assess patient's baseline mobility status (ambulation, transfers, stairs, etc )    - Identify cognitive and physical deficits and behaviors that affect mobility  - Identify mobility aids required to assist with transfers and/or ambulation (gait belt, sit-to-stand, lift, walker, cane, etc )  - [place] fall precautions as indicated by assessment  - Record patient progress and toleration of activity level on Mobility SBAR; progress patient to next Phase/Stage  - Instruct patient to call for assistance with activity based on assessment  - Consider rehabilitation consult to assist with strengthening/weightbearing, etc   Outcome: Progressing     Problem: DISCHARGE PLANNING  Goal: Discharge to home or other facility with appropriate resources  Description  INTERVENTIONS:  - Identify barriers to discharge w/patient and caregiver  - Arrange for needed discharge resources and transportation as appropriate  - Identify discharge learning needs (meds, wound care, etc )  - Refer to Case Management Department for coordinating discharge planning if the patient needs post-hospital services based on physician/advanced practitioner order or complex needs related to functional status, cognitive ability, or social support system   Outcome: Progressing     Problem: Knowledge Deficit  Goal: Patient/family/caregiver demonstrates understanding of disease process, treatment plan, medications, and discharge instructions  Description  Complete learning assessment and assess knowledge base    Interventions:  - Provide teaching at level of understanding  - Provide teaching via preferred learning methods  Outcome: Progressing     Problem: GASTROINTESTINAL - ADULT  Goal: Minimal or absence of nausea and/or vomiting  Description  INTERVENTIONS:  - Administer IV fluids if ordered to ensure adequate hydration  - Maintain NPO status until nausea and vomiting are resolved  - Nasogastric tube if ordered  - Administer ordered antiemetic medications as needed  - Provide nonpharmacologic comfort measures as appropriate  - Advance diet as tolerated, if ordered  - Consider nutrition services referral to assist patient with adequate nutrition and appropriate food choices  Outcome: Progressing  Goal: Maintains or returns to baseline bowel function  Description  INTERVENTIONS:  - Assess bowel function  - Encourage oral fluids to ensure adequate hydration  - Administer IV fluids if ordered to ensure adequate hydration  - Administer ordered medications as needed  - Encourage mobilization and activity  - Consider nutritional services referral to assist patient with adequate nutrition and appropriate food choices  Outcome: Progressing  Goal: Maintains adequate nutritional intake  Description  INTERVENTIONS:  - Monitor percentage of each meal consumed  - Identify factors contributing to decreased intake, treat as appropriate  - Assist with meals as needed  - Monitor I&O, weight, and lab values if indicated  - Obtain nutrition services referral as needed  Outcome: Progressing     Problem: DISCHARGE PLANNING - CARE MANAGEMENT  Goal: Discharge to post-acute care or home with appropriate resources  Description  INTERVENTIONS:  - Conduct assessment to determine patient/family and health care team treatment goals, and need for post-acute services based on payer coverage, community resources, and patient preferences, and barriers to discharge  - Address psychosocial, clinical, and financial barriers to discharge as identified in assessment in conjunction with the patient/family and health care team  - Arrange appropriate level of post-acute services according to patient's   needs and preference and payer coverage in collaboration with the physician and health care team  - Communicate with and update the patient/family, physician, and health care team regarding progress on the discharge plan  - Arrange appropriate transportation to post-acute venues  -[facility] to [facility] on dc  -need auth with insurance to return to SNF on dc   Outcome: Progressing     Problem: GENITOURINARY - ADULT  Goal: Urinary catheter remains patent  Description  INTERVENTIONS:  - Assess patency of urinary catheter  - If patient has a chronic garcia, consider changing catheter if non-functioning  - Follow guidelines for intermittent irrigation of non-functioning urinary catheter  Outcome: Progressing     Problem: METABOLIC, FLUID AND ELECTROLYTES - ADULT  Goal: Electrolytes maintained within normal limits  Description  INTERVENTIONS:  - Monitor labs and assess patient for signs and symptoms of electrolyte imbalances  - Administer electrolyte replacement as ordered  - Monitor response to electrolyte replacements, including repeat lab results as appropriate  - Instruct patient on fluid and nutrition as appropriate  Outcome: Progressing  Goal: Fluid balance maintained  Description  INTERVENTIONS:  - Monitor labs   - Monitor I/O and WT  - Instruct patient on fluid and nutrition as appropriate  - Assess for signs & symptoms of volume excess or deficit  Outcome: Progressing  Goal: Glucose maintained within target range  Description  INTERVENTIONS:  - Monitor Blood Glucose as ordered  - Assess for signs and symptoms of hyperglycemia and hypoglycemia  - Administer ordered medications to maintain glucose within target range  - Assess nutritional intake and initiate nutrition service referral as needed  Outcome: Progressing     Problem: SKIN/TISSUE INTEGRITY - ADULT  Goal: Skin integrity remains intact  Description  INTERVENTIONS  - Identify patients at risk for skin breakdown  - Assess and monitor skin integrity  - Assess and monitor nutrition and hydration status  - Monitor labs (i e  albumin)  - Assess for incontinence   - Turn and reposition patient  - Assist with mobility/ambulation  - Relieve pressure over bony prominences  - Avoid friction and shearing  - Provide appropriate hygiene as needed including keeping skin clean and dry  - Evaluate need for skin moisturizer/barrier cream  - Collaborate with interdisciplinary team (i e  Nutrition, Rehabilitation, etc )   - Patient/family teaching  Outcome: Progressing  Goal: Incision(s), wounds(s) or drain site(s) healing without S/S of infection  Description  INTERVENTIONS  - Assess and document risk factors for skin impairment   - Assess and document dressing, incision, wound bed, drain sites and surrounding tissue  - Consider nutrition services referral as needed  - Oral mucous membranes remain intact  - Provide patient/ family education  Outcome: Progressing     Problem: HEMATOLOGIC - ADULT  Goal: Maintains hematologic stability  Description  INTERVENTIONS  - Assess for signs and symptoms of bleeding or hemorrhage  - Monitor labs  - Administer supportive blood products/factors as ordered and appropriate  Outcome: Progressing     Problem: MUSCULOSKELETAL - ADULT  Goal: Maintain or return mobility to safest level of function  Description  INTERVENTIONS:  - Assess patient's ability to carry out ADLs; assess patient's baseline for ADL function and identify physical deficits which impact ability to perform ADLs (bathing, care of mouth/teeth, toileting, grooming, dressing, etc )  - Assess/evaluate cause of self-care deficits   - Assess range of motion  - Assess patient's mobility  - Assess patient's need for assistive devices and provide as appropriate  - Encourage maximum independence but intervene and supervise when necessary  - Involve family in performance of ADLs  - Assess for home care needs following discharge   - Consider OT consult to assist with ADL evaluation and planning for discharge  - Provide patient education as appropriate  Outcome: Progressing

## 2019-09-28 NOTE — PROGRESS NOTES
Progress Note - Nephrology   Ivy Robles [de-identified] y o  male MRN: [id number]  Unit/Bed#: 009-78 Encounter: [id number]    A/P:  1  Acute renal failure likely due to volume depletion              Creatinine continues to improve, continue IV fluid for now may be discontinued depending on the patient as with the drinking  Patient appears to eaten and drank most of his breakfast, this continues with lunch discontinuation of IV fluids may be appropriate  2  Chronic kidney disease stage 3 with baseline creatinine likely around 1 4 - 1 5 mg/dL  3  Probable diabetic nephropathy  4  Proteinuria               Patient has had this issue in the past, continue closely monitor most likely due to diabetes  5  Acidosis                PYFZH sodium levels appropriate, continue with supplementation at this time  6  Neuroendocrine tumor               Continue follow-up with Dr James Keys is continue with Lanreotide injections every 4 weeks for now   =============  7  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             Continue with methenamine hippurate twice a day, patient has a chronic Haile catheterization  8  Right hydroureter nephrosis              Renal function improved, no further anatomic evaluation indicated at this time  9  Acute pancreatitis               patient appears to have had his diet advanced, no symptoms at this time  Continue according to medical and surgical care           Follow up reason for today's visit:  Acute kidney injury/volume depletion/chronic kidney disease    Acute pancreatitis without infection or necrosis    Patient Active Problem List   Diagnosis    Weakness generalized    Type 2 diabetes 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, with long-term current use of insulin (HCC)    Essential hypertension    Mixed hyperlipidemia    Cardiac disease    Generalized abdominal mass    Hydroureter    Metabolic acidosis    Iron deficiency anemia secondary to inadequate dietary iron intake    Accelerated hypertension    Liver mass    Neuroendocrine tumor    Acute cystitis without hematuria    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)    Acute kidney injury ([facility] [address] )    Pressure injury of right heel, unstageable (HCC)    Atherosclerosis of artery of extremity with ulceration (HCC)    Carcinoid syndrome (HCC)    Acute cystitis with hematuria    Chronic indwelling Garcia catheter    Moderate protein-calorie malnutrition (HCC)    Biliary sludge    Urinary tract infection due to extended-spectrum beta lactamase (ESBL) producing Escherichia coli    Malignant neuroendocrine neoplasm (HCC)    Generalized abdominal pain    Urinary retention    Cholecystostomy tube dysfunction    Acute pancreatitis without infection or necrosis    Lactic acidosis    UTI due to extended-spectrum beta lactamase (ESBL) producing Escherichia coli    CKD (chronic kidney disease) stage 3, GFR 30-59 ml/min (HCC)    Hypomagnesemia         Subjective:   No acute events overnight    Objective:     Vitals: Blood pressure 125/53, pulse 88, temperature 98 4 °F (36 9 °C), resp  rate 18, height 5' 8" (1 727 m), weight 68 kg (149 lb 14 6 oz), SpO2 100 %  ,Body mass index is 22 79 kg/m²  Weight (last 2 days)     Date/Time   Weight    09/27/19 1146   68 (149 91)    09/26/19 04:08:39   68 (149 91)                Intake/Output Summary (Last 24 hours) at 9/28/2019 1128  Last data filed at 9/28/2019 1114  Gross per 24 hour   Intake 1742 5 ml   Output 2110 ml   Net -367 5 ml     I/O last 3 completed shifts: In: 2762 5 [P O :1050; I V :1712 5]  Out: 2300 [Urine:2250; Drains:50]    Urethral Catheter Latex 16 Fr   (Active)   Reasons to continue Urinary Catheter  Chronic urinary catheter 9/26/2019  8:07 PM   Goal for Removal N/A- chronic garcia 9/26/2019  8:07 PM   Site Assessment Clean;Skin intact 9/26/2019  8:07 PM   Collection Container Standard drainage bag 9/26/2019  8:07 PM   Securement Method Securing device (Describe) 9/26/2019  8:07 PM   Output (mL) 650 mL 9/27/2019  9:14 PM       Physical Exam: /53 (BP Location: Left arm)   Pulse 88   Temp 98 4 °F (36 9 °C)   Resp 18   Ht 5' 8" (1 727 m)   Wt 68 kg (149 lb 14 6 oz)   SpO2 100%   BMI 22 79 kg/m²     General Appearance:    Alert, cooperative, no distress, appears stated age   Head:    Normocephalic, without obvious abnormality, atraumatic   Eyes:    Conjunctiva/corneas clear   Ears:    Normal external ears   Nose:   Nares normal, septum midline, mucosa normal, no drainage    or sinus tenderness   Throat:   Lips, mucosa, and tongue normal; teeth and gums normal   Neck:   Supple   Back:     Symmetric, no curvature, ROM normal, no CVA tenderness   Lungs:     Clear to auscultation bilaterally, respirations unlabored   Chest wall:    No tenderness or deformity   Heart:    Regular rate and rhythm, S1 and S2 normal, no murmur, rub   or gallop   Abdomen:     Soft, non-tender, bowel sounds active   Extremities:   Extremities normal, atraumatic, no cyanosis or edema   Skin:   Skin color, texture, turgor normal, no rashes or lesions   Lymph nodes:   Cervical normal   Neurologic:   CNII-XII intact            Lab, Imaging and other studies: I have personally reviewed pertinent labs  CBC:   Lab Results   Component Value Date    WBC 7 69 09/28/2019    HGB 8 9 (L) 09/28/2019    HCT 27 3 (L) 09/28/2019    MCV 91 09/28/2019     09/28/2019    MCH 29 6 09/28/2019    MCHC 32 6 09/28/2019    RDW 14 4 09/28/2019    MPV 10 6 09/28/2019    NRBC 0 09/28/2019     CMP:   Lab Results   Component Value Date    K 4 2 09/28/2019     (H) 09/28/2019    CO2 19 (L) 09/28/2019    BUN 27 (H) 09/28/2019    CREATININE 2 53 (H) 09/28/2019    CALCIUM 7 9 (L) 09/28/2019    AST 14 09/28/2019    ALT 23 09/28/2019    ALKPHOS 172 (H) 09/28/2019    EGFR 23 09/28/2019           Results from last 7 days   Lab Units 09/28/19  0510 09/27/19  0500 09/26/19  050 POTASSIUM mmol/L 4 2 4 1 4 5   CHLORIDE mmol/L 113* 111* 106   CO2 mmol/L 19* 20* 22   BUN mg/dL 27* 37* 46*   CREATININE mg/dL 2 53* 2 72* 2 86*   CALCIUM mg/dL 7 9* 8 2* 8 4   ALK PHOS U/L 172* 186* 212*   ALT U/L 23 23 31   AST U/L 14 16 22         Phosphorus:   Lab Results   Component Value Date    PHOS 3 4 09/28/2019     Magnesium:   Lab Results   Component Value Date    MG 1 5 (L) 09/28/2019     Urinalysis: No results found for: Willetta Fees, SPECGRAV, PHUR, LEUKOCYTESUR, NITRITE, PROTEINUA, GLUCOSEU, KETONESU, BILIRUBINUR, BLOODU  Ionized Calcium: No results found for: CAION  Coagulation: No results found for: PT, INR, APTT  Troponin: No results found for: TROPONINI  ABG: No results found for: PHART, [id number], PO2ART, WPE6YAF, N0TZRCKI, BEART, SOURCE  Radiology review:     IMAGING  Procedure: Ct Abdomen Pelvis Wo Contrast    Result Date: 9/26/2019  Narrative: CT ABDOMEN AND PELVIS WITHOUT IV CONTRAST INDICATION:   9/6/19 cholecystomy tube; elevated lipase, abdm pain earlier  COMPARISON:  9/15/2019  TECHNIQUE:  CT examination of the abdomen and pelvis was performed without intravenous contrast   Axial, sagittal, and coronal 2D reformatted images were created from the source data and submitted for interpretation  Radiation dose length product (DLP) for this visit:  487 46 mGy-cm   This examination, like all CT scans performed in the [facility], was performed utilizing techniques to minimize radiation dose exposure, including the use of iterative  reconstruction and automated exposure control  Enteric contrast was administered  FINDINGS: ABDOMEN LOWER CHEST:  No clinically significant abnormality identified in the visualized lower chest  LIVER/BILIARY TREE:  Multiple hypodense lesions are seen within the liver concerning for metastasis  Osiel Guise GALLBLADDER:  Percutaneous cholecystostomy tube is again noted  SPLEEN:  Unremarkable  PANCREAS:  Unremarkable  ADRENAL GLANDS:  Unremarkable  KIDNEYS/URETERS:  Unremarkable  No hydronephrosis  STOMACH AND BOWEL:  Unremarkable  APPENDIX:  No findings to suggest appendicitis  ABDOMINOPELVIC CAVITY:  No ascites or free intraperitoneal air  Previously seen partially calcified mass over the mesentery measuring 4 3 x 2 5 x 2 2 cm is again noted and is unchanged in appearance  Previously seen left iliac partially calcified metastatic lymph node measuring 1 cm in short axis is again noted unchanged  VESSELS:  Unremarkable for patient's age  PELVIS REPRODUCTIVE ORGANS:  The prostate is enlarged  URINARY BLADDER:  Decompressed by Haile catheter  Bladder wall is thickened concerning for cystitis  ABDOMINAL WALL/INGUINAL REGIONS:  Unremarkable  OSSEOUS STRUCTURES:  No acute fracture or destructive osseous lesion  Impression: Previously seen partially calcified mass over the mesentery measuring 4 3 x 2 5 x 2 2 cm is again noted and is unchanged in appearance  Previously seen left iliac partially calcified metastatic lymph node measuring 1 cm in short axis is again noted unchanged  No CT evidence for pancreatitis  Percutaneous cholecystostomy tube is again noted  Workstation performed: [id number]     Procedure: Xr Chest 1 View Portable    Result Date: 9/26/2019  Narrative: CHEST INDICATION:   Abdominal pain  COMPARISON:  CT of the abdomen/pelvis dated 9/26/2019  Chest radiograph dated 6/30/2019  EXAM PERFORMED/VIEWS:  XR CHEST PORTABLE FINDINGS: Nodular opacity at the right lung base  Review of CT abdomen /pelvis from same day shows the areas covered in this is confirmed as nipple shadow  Normal lung volumes  No acute consolidation, pneumothorax, or effusion  Heart size normal   Normal pulmonary vasculature  Age-appropriate degenerative changes in the shoulders  No acute osseous findings  Cholecystostomy catheter projects over the right upper quadrant  Impression: No acute cardiopulmonary findings   Note: I agree with the preliminary interpretation by the ED care provider documented in [address] [place] [address] Rd   Workstation performed: [id number]       Current Facility-Administered Medications   Medication Dose Route Frequency    acetaminophen (TYLENOL) tablet 650 mg  650 mg Oral Q6H PRN    albuterol inhalation solution 2 5 mg  2 5 mg Nebulization Q6H PRN    aluminum-magnesium hydroxide-simethicone (MYLANTA) 200-200-20 mg/5 mL oral suspension 30 mL  30 mL Oral Q6H PRN    amLODIPine (NORVASC) tablet 10 mg  10 mg Oral Daily    aspirin chewable tablet 81 mg  81 mg Oral Daily    b complex-vitamin C-folic acid (NEPHROCAPS) capsule 1 capsule  1 capsule Oral Daily With Dinner    cholecalciferol (VITAMIN D3) tablet 1,000 Units  1,000 Units Oral Daily    dextrose 50 % IV solution 25 mL  25 mL Intravenous PRN    docusate sodium (COLACE) capsule 100 mg  100 mg Oral BID PRN    ertapenem (INVanz) 500 mg in sodium chloride 0 9 % 50 mL IVPB  500 mg Intravenous Q24H    heparin (porcine) subcutaneous injection 5,000 Units  5,000 Units Subcutaneous Q8H [address]    insulin detemir (LEVEMIR) subcutaneous injection 10 Units  10 Units Subcutaneous HS    insulin lispro (HumaLOG) 100 units/mL subcutaneous injection 1-5 Units  1-5 Units Subcutaneous TID AC    insulin lispro (HumaLOG) 100 units/mL subcutaneous injection 1-5 Units  1-5 Units Subcutaneous HS    methenamine hippurate (HIPREX) tablet 1 g  1 g Oral BID With Meals    ondansetron (ZOFRAN) injection 4 mg  4 mg Intravenous Q6H PRN    pantoprazole (PROTONIX) EC tablet 40 mg  40 mg Oral Early Morning    sodium bicarbonate tablet 650 mg  650 mg Oral BID after meals    sodium chloride 0 9 % infusion  75 mL/hr Intravenous Continuous    tamsulosin (FLOMAX) capsule 0 4 mg  0 4 mg Oral Daily With Dinner     Medications Discontinued During This Encounter   Medication Reason    sodium chloride 0 9 % infusion     colchicine (COLCRYS) tablet 0 6 mg     dextrose 5 % and sodium chloride 0 9 % infusion        Kaitlyn Álvarez DO

## 2019-09-28 NOTE — ASSESSMENT & PLAN NOTE
· Urinary tract infection due to chronic Haile catheter  · Unclear whether this is a true infection versus chronic colonization  · In the setting of abdominal pain and a lactic acidosis,the patient was initiated on ertapenem 500 mg IV every 24 hours on 9/27/19

## 2019-09-28 NOTE — PROGRESS NOTES
Progress Note - Chery South 1940, [de-identified] y o  male MRN: [id number]    Unit/Bed#: 810-48 Encounter: [id number]    Primary Care Provider: Elsie Lyn DO   Date and time admitted to hospital: 9/25/2019 10:37 PM        * Acute pancreatitis without infection or necrosis  Assessment & Plan  · Improving  · The lipase level is now normal  · Patient is tolerating a solid food diet  · Will discontinue NSS IV fluids today 9/28  · Serial abdominal examinations      Urinary tract infection due to extended-spectrum beta lactamase (ESBL) producing Escherichia coli  Assessment & Plan  · Urinary tract infection due to chronic Haile catheter  · Unclear whether this is a true infection versus chronic colonization  · In the setting of abdominal pain and a lactic acidosis,the patient was initiated on ertapenem 500 mg IV every 24 hours on 9/27/19    Acute kidney injury [facility])  Assessment & Plan  · Acute kidney injury was present on admission  · Baseline serum creatinine of 1 4-1 5 mg/dl  · Discontinue NSS IV fluids per nephrology given patient is eating and drinking well  · Avoid all nephrotoxic agents  · Maintain chronic Haile catheter for chronic urinary retention  · The patient was seen in consultation by Nephrology  · Serial laboratory testing to monitor the patient's renal function and electrolytes        CKD (chronic kidney disease) stage 3, GFR 30-59 ml/min ([facility])  Assessment & Plan  · Acute kidney injury was present on admission  · Baseline serum creatinine of 1 4-1 5 mg/dl  · Discontinue NSS IV fluids per nephrology recommendations     · Avoid all nephrotoxic agents  · Maintain chronic Haile catheter for chronic urinary retention  · The patient was seen in consultation by Nephrology  · Serial laboratory testing to monitor the patient's renal function and electrolytes      Lactic acidosis  Assessment & Plan  · Resolved with IV fluids    Cholecystostomy tube dysfunction  Assessment & Plan  · Initially presented to the emergency department with an obstructed cholecystostomy tube  · The patient was seen in consultation by General Surgery  · The cholecystostomy tube obstruction is now resolved and is functioning appropriately          Urinary retention  Assessment & Plan  · Chronic garcia catheter in place  · Continue methenamine hippurate  · Outpatient follow-up with Urology    Moderate protein-calorie malnutrition ([address] )  Assessment & Plan  Malnutrition Findings:        Malnutrition related to prolonged inadequate intake as evidenced by subcutaneous fat loss in bilateral upper extremities and orbital/cheek region, severe temporal wasting, clavicle/sternum visible, sunken orbitals, inability to meet nutrient needs by mouth  BMI Findings: Body mass index is 22 79 kg/m²  · Dietitian/nutritionist consultation  · Continue nutritional supplements      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)  Assessment & Plan  · Outpatient follow-up with Dr Sahil Hall (Hematology/Oncology) for lanreotide injections every 4 weeks      Type 2 diabetes 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, with long-term current use of insulin [facility])  Assessment & Plan  Lab Results   Component Value Date    HGBA1C 8 1 (H) 06/14/2019       Recent Labs     09/27/19  1636 09/27/19  2159 09/28/19  0752 09/28/19  1134   POCGLU 166* 167* 85 252*       Blood Sugar Average: Last 72 hrs:  (P) 164 [id number]   · Continue levemir 10 Units SQ QHS  · Hypoglycemia protocol  · Follow the blood glucose trend        Hypomagnesemia  Assessment & Plan  · Magnesium low today at 1 5  · The patient received 2 g IV magnesium  · Repeat labs in am        VTE Pharmacologic Prophylaxis:   Pharmacologic: Heparin  Mechanical VTE Prophylaxis in Place: Yes    Patient Centered Rounds: I have performed bedside rounds with nursing staff today      Discussions with Specialists or Other Care Team Provider: nursing, CM, and attending    Education and Discussions with Family / Patient: wife updated at bedside    Time Spent for Care: 20 minutes  More than 50% of total time spent on counseling and coordination of care as described above  Current Length of Stay: 2 day(s)    Current Patient Status: Inpatient   Certification Statement: The patient will continue to require additional inpatient hospital stay due to continued need for serial labs  Discharge Plan: TBD    Code Status: Level 1 - Full Code      Subjective: The patient was seen and examined  The patient states he is feeling somewhat better  He states he feels tired/weak  He denies any abdominal pain, nausea, or vomiting  Objective:     Vitals:   Temp (24hrs), Av 7 °F (36 5 °C), Min:97 3 °F (36 3 °C), Max:98 4 °F (36 9 °C)    Temp:  [97 3 °F (36 3 °C)-98 4 °F (36 9 °C)] 98 4 °F (36 9 °C)  HR:  [73-88] 73  Resp:  [18] 18  BP: (114-136)/(45-62) 114/45  SpO2:  [99 %-100 %] 99 %  Body mass index is 22 79 kg/m²  Input and Output Summary (last 24 hours): Intake/Output Summary (Last 24 hours) at 2019 1559  Last data filed at 2019 1554  Gross per 24 hour   Intake 2042 5 ml   Output 2770 ml   Net -727 5 ml       Physical Exam:     Physical Exam   Constitutional: He is oriented to person, place, and time  Vital signs are normal  He is active and cooperative  Cardiovascular: Normal rate and regular rhythm  Pulmonary/Chest: Effort normal and breath sounds normal  He has no wheezes  He has no rhonchi  He has no rales  Abdominal: Soft  Normal appearance and bowel sounds are normal  There is no tenderness  Neurological: He is alert and oriented to person, place, and time  No cranial nerve deficit  Skin: Skin is warm, dry and intact  Nursing note and vitals reviewed        Additional Data:     Labs:    Results from last 7 days   Lab Units 19  0510   WBC Thousand/uL 7 69   HEMOGLOBIN g/dL 8 9*   HEMATOCRIT % 27 3*   PLATELETS Thousands/uL 171   NEUTROS PCT % 74   LYMPHS PCT % 16   MONOS PCT % 8   EOS PCT % 1     Results from last 7 days   Lab Units 09/28/19  0510   SODIUM mmol/L 143   POTASSIUM mmol/L 4 2   CHLORIDE mmol/L 113*   CO2 mmol/L 19*   BUN mg/dL 27*   CREATININE mg/dL 2 53*   ANION GAP mmol/L 11   CALCIUM mg/dL 7 9*   ALBUMIN g/dL 2 2*   TOTAL BILIRUBIN mg/dL 0 40   ALK PHOS U/L 172*   ALT U/L 23   AST U/L 14   GLUCOSE RANDOM mg/dL 123     Results from last 7 days   Lab Units 09/26/19  0500   INR  1 01     Results from last 7 days   Lab Units 09/28/19  1134 09/28/19  0752 09/27/19  2159 09/27/19  1636 09/27/19  1107 09/27/19  0716 09/27/19  0522 09/26/19  2025 09/26/19  1741 09/26/19  1142 09/26/19  0839   POC GLUCOSE mg/dl 252* 85 167* 166* 262* 144* 160* 146* 128 163* 134         Results from last 7 days   Lab Units 09/27/19  0500 09/26/19  0302 09/25/19  2317   LACTIC ACID mmol/L 1 7 1 9 2 5*   PROCALCITONIN ng/ml 0 09  --   --            * I Have Reviewed All Lab Data Listed Above  * Additional Pertinent Lab Tests Reviewed: All Labs Within Last 24 Hours Reviewed    Imaging:    Imaging Reports Reviewed Today Include: none  Imaging Personally Reviewed by Myself Includes:  none    Recent Cultures (last 7 days):     Results from last 7 days   Lab Units 09/26/19  0302 09/25/19  2317   BLOOD CULTURE  No Growth at 48 hrs  No Growth at 48 hrs         Last 24 Hours Medication List:     Current Facility-Administered Medications:  acetaminophen 650 mg Oral Q6H PRN [employer], DO    albuterol 2 5 mg Nebulization Q6H PRN Shaun Hernandez MD    aluminum-magnesium hydroxide-simethicone 30 mL Oral Q6H PRN Shaun Hernandez MD    amLODIPine 10 mg Oral Daily Shaun Hernandez MD    aspirin 81 mg Oral Daily Shaun Hernandez MD    b complex-vitamin C-folic acid 1 capsule Oral Daily With Berna Evans MD    cholecalciferol 1,000 Units Oral Daily Shaun Hernandez MD    dextrose 25 mL Intravenous PRN [employer], DO    docusate sodium 100 mg Oral BID PRN Patricia Jordan MD    ertapenem 500 mg Intravenous Q24H Kiran Marrero DO Last Rate: 500 mg (09/28/19 1537)   heparin (porcine) 5,000 Units Subcutaneous [facility] Patricia Jordan MD    insulin detemir 10 Units Subcutaneous HS Patricia Jordan MD    insulin lispro 1-5 Units Subcutaneous TID AC Patricia Jordan MD    insulin lispro 1-5 Units Subcutaneous HS Patricia Jordan MD    methenamine hippurate 1 g Oral BID With Meals Patricia Jordan MD    ondansetron 4 mg Intravenous Q6H PRN Patricia Jordan MD    pantoprazole 40 mg Oral Early Morning Patricia Jordan MD    sodium bicarbonate 650 mg Oral BID after meals Patricia Jordan MD    tamsulosin 0 4 mg Oral Daily With Ebenezer Pimentel MD         Today, Patient Was Seen By: Shonna Arnold PA-C    ** Please Note: Dictation voice to text software may have been used in the creation of this document   **

## 2019-09-28 NOTE — ASSESSMENT & PLAN NOTE
· Outpatient follow-up with Dr Kiera Estes (Hematology/Oncology) for lanreotide injections every 4 weeks

## 2019-09-29 PROBLEM — D64.9 ANEMIA: Status: ACTIVE | Noted: 2019-09-29

## 2019-09-29 LAB
ALBUMIN SERPL BCP-MCNC: 2.1 G/DL (ref 3.5–5)
ALP SERPL-CCNC: 151 U/L (ref 46–116)
ALT SERPL W P-5'-P-CCNC: 14 U/L (ref 12–78)
ANION GAP SERPL CALCULATED.3IONS-SCNC: 9 MMOL/L (ref 4–13)
AST SERPL W P-5'-P-CCNC: 11 U/L (ref 5–45)
BASOPHILS # BLD AUTO: 0.07 THOUSANDS/ΜL (ref 0–0.1)
BASOPHILS NFR BLD AUTO: 1 % (ref 0–1)
BILIRUB SERPL-MCNC: 0.3 MG/DL (ref 0.2–1)
BUN SERPL-MCNC: 25 MG/DL (ref 5–25)
CALCIUM SERPL-MCNC: 7.9 MG/DL (ref 8.3–10.1)
CHLORIDE SERPL-SCNC: 109 MMOL/L (ref 100–108)
CO2 SERPL-SCNC: 22 MMOL/L (ref 21–32)
CREAT SERPL-MCNC: 2.56 MG/DL (ref 0.6–1.3)
EOSINOPHIL # BLD AUTO: 0.15 THOUSAND/ΜL (ref 0–0.61)
EOSINOPHIL NFR BLD AUTO: 2 % (ref 0–6)
ERYTHROCYTE [DISTWIDTH] IN BLOOD BY AUTOMATED COUNT: 14.5 % (ref 11.6–15.1)
GFR SERPL CREATININE-BSD FRML MDRD: 23 ML/MIN/1.73SQ M
GLUCOSE SERPL-MCNC: 220 MG/DL (ref 65–140)
GLUCOSE SERPL-MCNC: 222 MG/DL (ref 65–140)
GLUCOSE SERPL-MCNC: 233 MG/DL (ref 65–140)
GLUCOSE SERPL-MCNC: 70 MG/DL (ref 65–140)
GLUCOSE SERPL-MCNC: 90 MG/DL (ref 65–140)
HCT VFR BLD AUTO: 24.9 % (ref 36.5–49.3)
HGB BLD-MCNC: 8.1 G/DL (ref 12–17)
IMM GRANULOCYTES # BLD AUTO: 0.02 THOUSAND/UL (ref 0–0.2)
IMM GRANULOCYTES NFR BLD AUTO: 0 % (ref 0–2)
LYMPHOCYTES # BLD AUTO: 1.38 THOUSANDS/ΜL (ref 0.6–4.47)
LYMPHOCYTES NFR BLD AUTO: 22 % (ref 14–44)
MAGNESIUM SERPL-MCNC: 1.7 MG/DL (ref 1.6–2.6)
MCH RBC QN AUTO: 29.3 PG (ref 26.8–34.3)
MCHC RBC AUTO-ENTMCNC: 32.5 G/DL (ref 31.4–37.4)
MCV RBC AUTO: 90 FL (ref 82–98)
MONOCYTES # BLD AUTO: 0.54 THOUSAND/ΜL (ref 0.17–1.22)
MONOCYTES NFR BLD AUTO: 9 % (ref 4–12)
NEUTROPHILS # BLD AUTO: 4.06 THOUSANDS/ΜL (ref 1.85–7.62)
NEUTS SEG NFR BLD AUTO: 66 % (ref 43–75)
NRBC BLD AUTO-RTO: 0 /100 WBCS
PLATELET # BLD AUTO: 149 THOUSANDS/UL (ref 149–390)
PMV BLD AUTO: 10.3 FL (ref 8.9–12.7)
POTASSIUM SERPL-SCNC: 3.9 MMOL/L (ref 3.5–5.3)
PROT SERPL-MCNC: 5.4 G/DL (ref 6.4–8.2)
RBC # BLD AUTO: 2.76 MILLION/UL (ref 3.88–5.62)
SODIUM SERPL-SCNC: 140 MMOL/L (ref 136–145)
WBC # BLD AUTO: 6.22 THOUSAND/UL (ref 4.31–10.16)

## 2019-09-29 PROCEDURE — 82948 REAGENT STRIP/BLOOD GLUCOSE: CPT

## 2019-09-29 PROCEDURE — 99232 SBSQ HOSP IP/OBS MODERATE 35: CPT | Performed by: SPECIALIST

## 2019-09-29 PROCEDURE — 99232 SBSQ HOSP IP/OBS MODERATE 35: CPT | Performed by: INTERNAL MEDICINE

## 2019-09-29 PROCEDURE — 82272 OCCULT BLD FECES 1-3 TESTS: CPT | Performed by: PHYSICIAN ASSISTANT

## 2019-09-29 PROCEDURE — 83735 ASSAY OF MAGNESIUM: CPT | Performed by: PHYSICIAN ASSISTANT

## 2019-09-29 PROCEDURE — 99232 SBSQ HOSP IP/OBS MODERATE 35: CPT | Performed by: PHYSICIAN ASSISTANT

## 2019-09-29 PROCEDURE — 85025 COMPLETE CBC W/AUTO DIFF WBC: CPT | Performed by: PHYSICIAN ASSISTANT

## 2019-09-29 PROCEDURE — 80053 COMPREHEN METABOLIC PANEL: CPT | Performed by: PHYSICIAN ASSISTANT

## 2019-09-29 RX ORDER — SIMETHICONE 80 MG
80 TABLET,CHEWABLE ORAL EVERY 6 HOURS PRN
COMMUNITY
End: 2019-11-18 | Stop reason: SDUPTHER

## 2019-09-29 RX ADMIN — TAMSULOSIN HYDROCHLORIDE 0.4 MG: 0.4 CAPSULE ORAL at 16:28

## 2019-09-29 RX ADMIN — INSULIN DETEMIR 10 UNITS: 100 INJECTION, SOLUTION SUBCUTANEOUS at 21:38

## 2019-09-29 RX ADMIN — HEPARIN SODIUM 5000 UNITS: 5000 INJECTION INTRAVENOUS; SUBCUTANEOUS at 14:59

## 2019-09-29 RX ADMIN — AMLODIPINE BESYLATE 10 MG: 10 TABLET ORAL at 09:00

## 2019-09-29 RX ADMIN — ERTAPENEM SODIUM 500 MG: 1 INJECTION, POWDER, LYOPHILIZED, FOR SOLUTION INTRAMUSCULAR; INTRAVENOUS at 14:58

## 2019-09-29 RX ADMIN — INSULIN LISPRO 1 UNITS: 100 INJECTION, SOLUTION INTRAVENOUS; SUBCUTANEOUS at 11:36

## 2019-09-29 RX ADMIN — PANTOPRAZOLE SODIUM 40 MG: 40 TABLET, DELAYED RELEASE ORAL at 05:14

## 2019-09-29 RX ADMIN — VITAMIN D, TAB 1000IU (100/BT) 1000 UNITS: 25 TAB at 09:00

## 2019-09-29 RX ADMIN — ASPIRIN 81 MG 81 MG: 81 TABLET ORAL at 09:00

## 2019-09-29 RX ADMIN — HEPARIN SODIUM 5000 UNITS: 5000 INJECTION INTRAVENOUS; SUBCUTANEOUS at 21:38

## 2019-09-29 RX ADMIN — INSULIN LISPRO 2 UNITS: 100 INJECTION, SOLUTION INTRAVENOUS; SUBCUTANEOUS at 16:27

## 2019-09-29 RX ADMIN — SODIUM BICARBONATE 650 MG TABLET 650 MG: at 09:00

## 2019-09-29 RX ADMIN — SODIUM BICARBONATE 650 MG TABLET 650 MG: at 16:31

## 2019-09-29 RX ADMIN — METHENAMINE HIPPURATE 1 G: 1 TABLET ORAL at 09:00

## 2019-09-29 RX ADMIN — Medication 1 CAPSULE: at 16:28

## 2019-09-29 RX ADMIN — METHENAMINE HIPPURATE 1 G: 1 TABLET ORAL at 16:28

## 2019-09-29 RX ADMIN — HEPARIN SODIUM 5000 UNITS: 5000 INJECTION INTRAVENOUS; SUBCUTANEOUS at 05:14

## 2019-09-29 RX ADMIN — INSULIN LISPRO 1 UNITS: 100 INJECTION, SOLUTION INTRAVENOUS; SUBCUTANEOUS at 21:37

## 2019-09-29 NOTE — ASSESSMENT & PLAN NOTE
· Magnesium level at 1 7 today  · The patient received 2 g IV magnesium  · Monitor magnesium level and replete as needed

## 2019-09-29 NOTE — ASSESSMENT & PLAN NOTE
· Acute kidney injury was present on admission  · Baseline serum creatinine of 1 4-1 5 mg/dl  · NSS IV fluids discontinued on 9/28/19 per nephrology recommendations     · Avoid all nephrotoxic agents  · Maintain chronic Haile catheter for chronic urinary retention  · The patient was seen in consultation by Nephrology  · Serial laboratory testing to monitor the patient's renal function and electrolytes

## 2019-09-29 NOTE — ASSESSMENT & PLAN NOTE
Lab Results   Component Value Date    HGBA1C 8 1 (H) 06/14/2019       Recent Labs     09/28/19  1548 09/28/19  2030 09/29/19  0753 09/29/19  1134   POCGLU 215* 213* 70 220*       Blood Sugar Average: Last 72 hrs:  (P) 272 [id number]   · Continue levemir 10 Units SQ QHS  · Hypoglycemia protocol  · Follow the blood glucose trend

## 2019-09-29 NOTE — PROGRESS NOTES
Progress Note - General Surgery   Jay Jay Queen [de-identified] y o  male MRN: [id number]  Unit/Bed#: 413-01 Encounter: [id number]    Assessment:  OOB to reclining chair  Comfortable, tolerating PO  With Scant Clear bile from Perc Josette Tube  Symptoms of pancreatitis appear to have resolved    Plan:  Continue percutaneous cholecystostomy  Appears too frail for cholecystectomy    Subjective/Objective   Chief Complaint: No complaints    Subjective: Appears comfortable    Objective:     Blood pressure 141/59, pulse 73, temperature 97 9 °F (36 6 °C), resp  rate 18, height 5' 8" (1 727 m), weight 68 kg (149 lb 14 6 oz), SpO2 100 %  ,Body mass index is 22 79 kg/m²        Intake/Output Summary (Last 24 hours) at 9/29/2019 1022  Last data filed at 9/29/2019 0915  Gross per 24 hour   Intake 1673 ml   Output 2180 ml   Net -507 ml       Invasive Devices     Peripheral Intravenous Line            Peripheral IV 09/28/19 Left;Ventral (anterior) Forearm less than 1 day          Drain            Urethral Catheter Latex 16 Fr  92 days    Closed/Suction Drain Right RUQ Bulb 10 2 Fr  22 days    Closed/Suction Drain Lateral RLQ Bulb 3 days                Physical Exam:   Dressing at [facility] clean and dry  With Scant clear bile in the percutaneous cholecystostomy bulb    Lab, Imaging and other studies:  CBC:   Lab Results   Component Value Date    WBC 6 22 09/29/2019    HGB 8 1 (L) 09/29/2019    HCT 24 9 (L) 09/29/2019    MCV 90 09/29/2019     09/29/2019    MCH 29 3 09/29/2019    MCHC 32 5 09/29/2019    RDW 14 5 09/29/2019    MPV 10 3 09/29/2019    NRBC 0 09/29/2019   , CMP:   Lab Results   Component Value Date    SODIUM 140 09/29/2019    K 3 9 09/29/2019     (H) 09/29/2019    CO2 22 09/29/2019    BUN 25 09/29/2019    CREATININE 2 56 (H) 09/29/2019    CALCIUM 7 9 (L) 09/29/2019    AST 11 09/29/2019    ALT 14 09/29/2019    ALKPHOS 151 (H) 09/29/2019    EGFR 23 09/29/2019     Total Protein 5 4Low  g/dL      Albumin 2 1Low  g/dL Total Bilirubin 0 30 mg/dL     eGFR 23 ml/min/1 73sq m      VTE Pharmacologic Prophylaxis: Heparin  VTE Mechanical Prophylaxis: sequential compression device

## 2019-09-29 NOTE — ASSESSMENT & PLAN NOTE
· Patient noted to have a hemoglobin of 10 3 on admission which was been slowly trending down  · Hemoglobin today: 8 1  · Heme test stools  · Check Iron panel, vitamin b 12 and folate  · Repeat labs in am, consider transfusion if hemoglobin continues to trend down

## 2019-09-29 NOTE — ASSESSMENT & PLAN NOTE
· Acute kidney injury was present on admission  · Baseline serum creatinine of 1 4-1 5 mg/dl  · NSS IV fluids discontinued on 9/28/19 per nephrology given patient is eating and drinking well     · Avoid all nephrotoxic agents  · Maintain chronic Haile catheter for chronic urinary retention  · The patient was seen in consultation by Nephrology  · Serial laboratory testing to monitor the patient's renal function and electrolytes

## 2019-09-29 NOTE — ASSESSMENT & PLAN NOTE
· Outpatient follow-up with Dr Heriberto Griffith (Hematology/Oncology) for lanreotide injections every 4 weeks

## 2019-09-29 NOTE — PROGRESS NOTES
Progress Note - Arturo Ortiz 1940, [de-identified] y o  male MRN: [id number]    Unit/Bed#: 597-99 Encounter: [id number]    Primary Care Provider: Joanne Rose DO   Date and time admitted to hospital: 9/25/2019 10:37 PM        Acute kidney injury [facility])  Assessment & Plan  · Acute kidney injury was present on admission  · Baseline serum creatinine of 1 4-1 5 mg/dl  · NSS IV fluids discontinued on 9/28/19 per nephrology given patient is eating and drinking well  · Avoid all nephrotoxic agents  · Maintain chronic Haile catheter for chronic urinary retention  · The patient was seen in consultation by Nephrology  · Serial laboratory testing to monitor the patient's renal function and electrolytes        Anemia  Assessment & Plan  · Patient noted to have a hemoglobin of 10 3 on admission which was been slowly trending down  · Hemoglobin today: 8 1  · Heme test stools  · Check Iron panel, vitamin b 12 and folate  · Repeat labs in am, consider transfusion if hemoglobin continues to trend down  * Acute pancreatitis without infection or necrosis  Assessment & Plan  · Resolved  · The lipase level is now normal  · Patient is tolerating a solid food diet  · NSS IV fluids discontinued on 9/28/19      Urinary tract infection due to extended-spectrum beta lactamase (ESBL) producing Escherichia coli  Assessment & Plan  · Urinary tract infection due to chronic Haile catheter  · Unclear whether this is a true infection versus chronic colonization  · In the setting of abdominal pain and a lactic acidosis,the patient was initiated on ertapenem 500 mg IV every 24 hours on 9/27/19    CKD (chronic kidney disease) stage 3, GFR 30-59 ml/min ([facility])  Assessment & Plan  · Acute kidney injury was present on admission  · Baseline serum creatinine of 1 4-1 5 mg/dl  · NSS IV fluids discontinued on 9/28/19 per nephrology recommendations     · Avoid all nephrotoxic agents  · Maintain chronic Haile catheter for chronic urinary retention  · The patient was seen in consultation by Nephrology  · Serial laboratory testing to monitor the patient's renal function and electrolytes      Lactic acidosis  Assessment & Plan  · Resolved with IV fluids    Cholecystostomy tube dysfunction  Assessment & Plan  · Initially presented to the emergency department with an obstructed cholecystostomy tube  · The patient was seen in consultation by General Surgery  · The cholecystostomy tube obstruction is now resolved and is functioning appropriately          Urinary retention  Assessment & Plan  · Chronic garcia catheter in place  · Continue methenamine hippurate  · Outpatient follow-up with Urology    Moderate protein-calorie malnutrition ([address] )  Assessment & Plan  Malnutrition Findings:        Malnutrition related to prolonged inadequate intake as evidenced by subcutaneous fat loss in bilateral upper extremities and orbital/cheek region, severe temporal wasting, clavicle/sternum visible, sunken orbitals, inability to meet nutrient needs by mouth  BMI Findings: Body mass index is 22 79 kg/m²  · Dietitian/nutritionist consultation  · Continue nutritional supplements      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 [facility])  Assessment & Plan  · Outpatient follow-up with Dr Fabiana Rubin (Hematology/Oncology) for lanreotide injections every 4 weeks      Type 2 diabetes 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, with long-term current use of insulin [facility])  Assessment & Plan  Lab Results   Component Value Date    HGBA1C 8 1 (H) 06/14/2019       Recent Labs     09/28/19  1548 09/28/19  2030 09/29/19  0753 09/29/19  1134   POCGLU 215* 213* 70 220*       Blood Sugar Average: Last 72 hrs:  (P) 740 [id number]   · Continue levemir 10 Units SQ QHS  · Hypoglycemia protocol  · Follow the blood glucose trend        Hypomagnesemia  Assessment & Plan  · Magnesium level at 1 7 today     · The patient received 2 g IV magnesium  · Monitor magnesium level and replete as needed  VTE Pharmacologic Prophylaxis:   Pharmacologic: Heparin  Mechanical VTE Prophylaxis in Place: Yes    Patient Centered Rounds: I have performed bedside rounds with nursing staff today  Discussions with Specialists or Other Care Team Provider: nursing and attending    Education and Discussions with Family / Patient: wife updated at bedside    Time Spent for Care: 30 minutes  More than 50% of total time spent on counseling and coordination of care as described above  Current Length of Stay: 3 day(s)    Current Patient Status: Inpatient   Certification Statement: The patient will continue to require additional inpatient hospital stay due to continued monitoring of labs  Discharge Plan: patient to be discharged back to [facility] when medically cleared  Code Status: Level 1 - Full Code      Subjective: The patient was seen and examined  The patient denies any abdominal pain  He is tolerating his diet  Objective:     Vitals:   Temp (24hrs), Av 2 °F (36 8 °C), Min:97 9 °F (36 6 °C), Max:98 4 °F (36 9 °C)    Temp:  [97 9 °F (36 6 °C)-98 4 °F (36 9 °C)] 97 9 °F (36 6 °C)  HR:  [73-79] 73  Resp:  [18-20] 18  BP: (114-143)/(45-59) 141/59  SpO2:  [97 %-100 %] 100 %  Body mass index is 22 79 kg/m²  Input and Output Summary (last 24 hours): Intake/Output Summary (Last 24 hours) at 2019 1426  Last data filed at 2019 0915  Gross per 24 hour   Intake 1323 ml   Output 2180 ml   Net -857 ml       Physical Exam:     Physical Exam   Constitutional: He is oriented to person, place, and time  He appears well-developed and well-nourished  He is active and cooperative  Cardiovascular: Normal rate and regular rhythm  Pulmonary/Chest: Effort normal and breath sounds normal  He has no wheezes  He has no rhonchi  He has no rales  Abdominal: Soft  Normal appearance and bowel sounds are normal  There is no tenderness     Neurological: He is alert and oriented to person, place, and time  No cranial nerve deficit  Skin: Skin is warm, dry and intact  Nursing note and vitals reviewed  Additional Data:     Labs:    Results from last 7 days   Lab Units 09/29/19  0514   WBC Thousand/uL 6 22   HEMOGLOBIN g/dL 8 1*   HEMATOCRIT % 24 9*   PLATELETS Thousands/uL 149   NEUTROS PCT % 66   LYMPHS PCT % 22   MONOS PCT % 9   EOS PCT % 2     Results from last 7 days   Lab Units 09/29/19  0514   SODIUM mmol/L 140   POTASSIUM mmol/L 3 9   CHLORIDE mmol/L 109*   CO2 mmol/L 22   BUN mg/dL 25   CREATININE mg/dL 2 56*   ANION GAP mmol/L 9   CALCIUM mg/dL 7 9*   ALBUMIN g/dL 2 1*   TOTAL BILIRUBIN mg/dL 0 30   ALK PHOS U/L 151*   ALT U/L 14   AST U/L 11   GLUCOSE RANDOM mg/dL 90     Results from last 7 days   Lab Units 09/26/19  0500   INR  1 01     Results from last 7 days   Lab Units 09/29/19  1134 09/29/19  0753 09/28/19  2030 09/28/19  1548 09/28/19  1134 09/28/19  0752 09/27/19  2159 09/27/19  1636 09/27/19  1107 09/27/19  0716 09/27/19  0522 09/26/19  2025   POC GLUCOSE mg/dl 220* 70 213* 215* 252* 85 167* 166* 262* 144* 160* 146*         Results from last 7 days   Lab Units 09/28/19  0510 09/27/19  0500 09/26/19  0302 09/25/19  2317   LACTIC ACID mmol/L  --  1 7 1 9 2 5*   PROCALCITONIN ng/ml 0 12 0 09  --   --            * I Have Reviewed All Lab Data Listed Above  * Additional Pertinent Lab Tests Reviewed: All Labs Within Last 24 Hours Reviewed    Imaging:    Imaging Reports Reviewed Today Include: none  Imaging Personally Reviewed by Myself Includes:  none    Recent Cultures (last 7 days):     Results from last 7 days   Lab Units 09/26/19  0302 09/25/19  2317   BLOOD CULTURE  No Growth at 72 hrs  No Growth at 72 hrs         Last 24 Hours Medication List:     Current Facility-Administered Medications:  acetaminophen 650 mg Oral Q6H PRN Delmy Gotti DO    albuterol 2 5 mg Nebulization Q6H PRN Roxane Lebron MD    aluminum-magnesium hydroxide-simethicone 30 mL Oral Q6H PRN Bambi Morgan MD    amLODIPine 10 mg Oral Daily Bambi Morgan MD    aspirin 81 mg Oral Daily Bambi Morgan MD    b complex-vitamin C-folic acid 1 capsule Oral Daily With Berna Evans MD    cholecalciferol 1,000 Units Oral Daily Bambi Morgan MD    dextrose 25 mL Intravenous PRN Sarah Wayne DO    docusate sodium 100 mg Oral BID PRN Bambi Morgan MD    ertapenem 500 mg Intravenous Q24H Sarah Wayne DO Last Rate: 500 mg (09/28/19 1537)   heparin (porcine) 5,000 Units Subcutaneous Q8H [address] Bambi Morgan MD    insulin detemir 10 Units Subcutaneous HS Bambi Morgan MD    insulin lispro 1-5 Units Subcutaneous TID AC Bambi Morgan MD    insulin lispro 1-5 Units Subcutaneous HS Bambi Morgan MD    methenamine hippurate 1 g Oral BID With Meals Bambi Morgan MD    ondansetron 4 mg Intravenous Q6H PRN Bambi Morgan MD    pantoprazole 40 mg Oral Early Morning Bambi Morgan MD    sodium bicarbonate 650 mg Oral BID after meals Bambi Morgan MD    tamsulosin 0 4 mg Oral Daily With Evelyn Cox MD         Today, Patient Was Seen By: Travon Bourgeois PA-C    ** Please Note: Dictation voice to text software may have been used in the creation of this document   **

## 2019-09-29 NOTE — ASSESSMENT & PLAN NOTE
· Resolved     · The lipase level is now normal  · Patient is tolerating a solid food diet  · NSS IV fluids discontinued on 9/28/19

## 2019-09-29 NOTE — PROGRESS NOTES
Progress Note - Nephrology   Nasra Cummins [de-identified] y o  male MRN: [id number]  Unit/Bed#: 672-00 Encounter: [id number]    A/P:  1  Acute renal failure likely due to volume depletion              Creatinine stable about 2 5 mg/dL  I have been concerned that this may be a new baseline creatinine for the patient, continue monitor and offer supportive treatment  Patient no longer on IV fluids at this time  2  Chronic kidney disease stage 3 with baseline creatinine likely around 1 4 - 1 5 mg/dL  3  Probable diabetic nephropathy  4  Proteinuria               No further evaluation at this time, most likely due to diabetes  5  Acidosis                Serum bicarbonate levels are appropriate, continue with supplement for now, may be able to decrease in the near future  6  Neuroendocrine tumor               Continue follow-up with Dr Terresa Dancer is continue with Lanreotide injections every 4 weeks for now   =============  7  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             Continue with methenamine hippurate twice a day, patient has a chronic Haile catheterization  8  Chronic urinary tract infections   Patient is on methenamine hippurate for prophylaxis, avoid carbapenem use if possible, unclear if the patient has an active infection at this time versus simple colonization     9  Acute pancreatitis               patient appears to have had his diet advanced, no symptoms at this time    Continue according to medical and surgical care          Follow up reason for today's visit:  Acute renal failure/chronic kidney disease    Acute pancreatitis without infection or necrosis    Patient Active Problem List   Diagnosis    Weakness generalized    Type 2 diabetes 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, with long-term current use of insulin (HCC)    Essential hypertension    Mixed hyperlipidemia    Cardiac disease    Generalized abdominal mass    Hydroureter    Metabolic acidosis    Iron deficiency anemia secondary to inadequate dietary iron intake    Accelerated hypertension    Liver mass    Neuroendocrine tumor    Acute cystitis without hematuria    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)    Acute kidney injury ([address] )    Pressure injury of right heel, unstageable (HCC)    Atherosclerosis of artery of extremity with ulceration (HCC)    Carcinoid syndrome (HCC)    Acute cystitis with hematuria    Chronic indwelling Garcia catheter    Moderate protein-calorie malnutrition (HCC)    Biliary sludge    Urinary tract infection due to extended-spectrum beta lactamase (ESBL) producing Escherichia coli    Malignant neuroendocrine neoplasm (HCC)    Generalized abdominal pain    Urinary retention    Cholecystostomy tube dysfunction    Acute pancreatitis without infection or necrosis    Lactic acidosis    UTI due to extended-spectrum beta lactamase (ESBL) producing Escherichia coli    CKD (chronic kidney disease) stage 3, GFR 30-59 ml/min (HCC)    Hypomagnesemia         Subjective:   Patient eating and drinking without nausea vomiting, abdominal pain no longer an issue  He simply complaining that too much food is given with meals  Objective:     Vitals: Blood pressure 141/59, pulse 73, temperature 97 9 °F (36 6 °C), resp  rate 18, height 5' 8" (1 727 m), weight 68 kg (149 lb 14 6 oz), SpO2 100 %  ,Body mass index is 22 79 kg/m²  Weight (last 2 days)     Date/Time   Weight    09/27/19 1146   68 (149 91)                Intake/Output Summary (Last 24 hours) at 9/29/2019 0905  Last data filed at 9/29/2019 0516  Gross per 24 hour   Intake 1793 ml   Output 2980 ml   Net -1187 ml     I/O last 3 completed shifts: In: 2525 5 [P O :1320; I V :1155 5; IV Piggyback:50]  Out: 9216 [Urine:3600; Drains:40]    Urethral Catheter Latex 16 Fr   (Active)   Reasons to continue Urinary Catheter  Chronic urinary catheter 9/28/2019  8:35 PM   Goal for Removal N/A- chronic garcia 9/28/2019  8:35 PM Site Assessment Clean;Skin intact 9/28/2019  8:35 PM   Collection Container Standard drainage bag 9/28/2019  8:35 PM   Securement Method Securing device (Describe) 9/28/2019  8:35 PM   Output (mL) 700 mL 9/29/2019  5:16 AM       Physical Exam: /59   Pulse 73   Temp 97 9 °F (36 6 °C)   Resp 18   Ht 5' 8" (1 727 m)   Wt 68 kg (149 lb 14 6 oz)   SpO2 100%   BMI 22 79 kg/m²     General Appearance:    Alert, cooperative, no distress, appears stated age   Head:    Normocephalic, without obvious abnormality, atraumatic   Eyes:    Conjunctiva/corneas clear   Ears:    Normal external ears   Nose:   Nares normal, septum midline, mucosa normal, no drainage    or sinus tenderness   Throat:   Lips, mucosa, and tongue normal; teeth and gums normal   Neck:   Supple   Back:     Symmetric, no curvature, ROM normal, no CVA tenderness   Lungs:     Clear to auscultation bilaterally, respirations unlabored   Chest wall:    No tenderness or deformity   Heart:    Regular rate and rhythm, S1 and S2 normal, no murmur, rub   or gallop   Abdomen:     Soft, non-tender, bowel sounds active   Extremities:   Extremities normal, atraumatic, no cyanosis or edema   Skin:   Skin color, texture, turgor normal, no rashes or lesions   Lymph nodes:   Cervical normal   Neurologic:   CNII-XII intact            Lab, Imaging and other studies: I have personally reviewed pertinent labs    CBC:   Lab Results   Component Value Date    WBC 6 22 09/29/2019    HGB 8 1 (L) 09/29/2019    HCT 24 9 (L) 09/29/2019    MCV 90 09/29/2019     09/29/2019    MCH 29 3 09/29/2019    MCHC 32 5 09/29/2019    RDW 14 5 09/29/2019    MPV 10 3 09/29/2019    NRBC 0 09/29/2019     CMP:   Lab Results   Component Value Date    K 3 9 09/29/2019     (H) 09/29/2019    CO2 22 09/29/2019    BUN 25 09/29/2019    CREATININE 2 56 (H) 09/29/2019    CALCIUM 7 9 (L) 09/29/2019    AST 11 09/29/2019    ALT 14 09/29/2019    ALKPHOS 151 (H) 09/29/2019    EGFR 23 09/29/2019    Results from last 7 days   Lab Units 09/29/19  0514 09/28/19  0510 09/27/19  0500   POTASSIUM mmol/L 3 9 4 2 4 1   CHLORIDE mmol/L 109* 113* 111*   CO2 mmol/L 22 19* 20*   BUN mg/dL 25 27* 37*   CREATININE mg/dL 2 56* 2 53* 2 72*   CALCIUM mg/dL 7 9* 7 9* 8 2*   ALK PHOS U/L 151* 172* 186*   ALT U/L 14 23 23   AST U/L 11 14 16         Phosphorus: No results found for: PHOS  Magnesium:   Lab Results   Component Value Date    MG 1 7 09/29/2019     Urinalysis: No results found for: COLORU, CLARITYU, SPECGRAV, PHUR, LEUKOCYTESUR, NITRITE, PROTEINUA, GLUCOSEU, KETONESU, BILIRUBINUR, BLOODU  Ionized Calcium: No results found for: CAION  Coagulation: No results found for: PT, INR, APTT  Troponin: No results found for: TROPONINI  ABG: No results found for: PHART, WVM0ADC, PO2ART, UIW5YXC, B8ZCVEIJ, BEART, SOURCE  Radiology review:     IMAGING  No results found      Current Facility-Administered Medications   Medication Dose Route Frequency    acetaminophen (TYLENOL) tablet 650 mg  650 mg Oral Q6H PRN    albuterol inhalation solution 2 5 mg  2 5 mg Nebulization Q6H PRN    aluminum-magnesium hydroxide-simethicone (MYLANTA) 200-200-20 mg/5 mL oral suspension 30 mL  30 mL Oral Q6H PRN    amLODIPine (NORVASC) tablet 10 mg  10 mg Oral Daily    aspirin chewable tablet 81 mg  81 mg Oral Daily    b complex-vitamin C-folic acid (NEPHROCAPS) capsule 1 capsule  1 capsule Oral Daily With Dinner    cholecalciferol (VITAMIN D3) tablet 1,000 Units  1,000 Units Oral Daily    dextrose 50 % IV solution 25 mL  25 mL Intravenous PRN    docusate sodium (COLACE) capsule 100 mg  100 mg Oral BID PRN    ertapenem (INVanz) 500 mg in sodium chloride 0 9 % 50 mL IVPB  500 mg Intravenous Q24H    heparin (porcine) subcutaneous injection 5,000 Units  5,000 Units Subcutaneous Q8H [address]    insulin detemir (LEVEMIR) subcutaneous injection 10 Units  10 Units Subcutaneous HS    insulin lispro (HumaLOG) 100 units/mL subcutaneous injection 1-5 Units  1-5 Units Subcutaneous TID AC    insulin lispro (HumaLOG) 100 units/mL subcutaneous injection 1-5 Units  1-5 Units Subcutaneous HS    methenamine hippurate (HIPREX) tablet 1 g  1 g Oral BID With Meals    ondansetron (ZOFRAN) injection 4 mg  4 mg Intravenous Q6H PRN    pantoprazole (PROTONIX) EC tablet 40 mg  40 mg Oral Early Morning    sodium bicarbonate tablet 650 mg  650 mg Oral BID after meals    tamsulosin (FLOMAX) capsule 0 4 mg  0 4 mg Oral Daily With Dinner     Medications Discontinued During This Encounter   Medication Reason    sodium chloride 0 9 % infusion     colchicine (COLCRYS) tablet 0 6 mg     dextrose 5 % and sodium chloride 0 9 % infusion     sodium chloride 0 9 % infusion        Ginna Jain DO

## 2019-09-29 NOTE — PLAN OF CARE
Problem: Potential for Falls  Goal: Patient will remain free of falls  Description  INTERVENTIONS:  - Assess patient frequently for physical needs  -  Identify cognitive and physical deficits and behaviors that affect risk of falls  -  [place] fall precautions as indicated by assessment  High fall risk    - Educate patient/family on patient safety including physical limitations  - Instruct patient to call for assistance with activity based on assessment  - Modify environment to reduce risk of injury  - Consider OT/PT consult to assist with strengthening/mobility   Outcome: Progressing     Problem: Prexisting or High Potential for Compromised Skin Integrity  Goal: Skin integrity is maintained or improved  Description  INTERVENTIONS:  - Identify patients at risk for skin breakdown  - Assess and monitor skin integrity  - Assess and monitor nutrition and hydration status  - Monitor labs   - Assess for incontinence   - Turn and reposition patient  - Assist with mobility/ambulation  - Relieve pressure over bony prominences  - Avoid friction and shearing  - Provide appropriate hygiene as needed including keeping skin clean and dry  - Evaluate need for skin moisturizer/barrier cream  - Collaborate with interdisciplinary team   - Patient/family teaching  - Consider wound care consult   Outcome: Progressing     Problem: Nutrition/Hydration-ADULT  Goal: Nutrient/Hydration intake appropriate for improving, restoring or maintaining nutritional needs  Description  Monitor and assess patient's nutrition/hydration status for malnutrition  Collaborate with interdisciplinary team and initiate plan and interventions as ordered  Monitor patient's weight and dietary intake as ordered or per policy  Utilize nutrition screening tool and intervene as necessary  Determine patient's food preferences and provide high-protein, high-caloric foods as appropriate       INTERVENTIONS:  - Monitor oral intake, urinary output, labs, and treatment plans  - Assess nutrition and hydration status and recommend course of action  - Evaluate amount of meals eaten  - Assist patient with eating if necessary   - Allow adequate time for meals  - Recommend/ encourage appropriate diets, oral nutritional supplements, and vitamin/mineral supplements  - Order, calculate, and assess calorie counts as needed  - Recommend, monitor, and adjust tube feedings and TPN/PPN based on assessed needs  - Assess need for intravenous fluids  - Provide specific nutrition/hydration education as appropriate  - Include patient/family/caregiver in decisions related to nutrition  Outcome: Progressing     Problem: PAIN - ADULT  Goal: Verbalizes/displays adequate comfort level or baseline comfort level  Description  Interventions:  - Encourage patient to monitor pain and request assistance  - Assess pain using appropriate pain scale  - Administer analgesics based on type and severity of pain and evaluate response  - Implement non-pharmacological measures as appropriate and evaluate response  - Consider cultural and social influences on pain and pain management  - Notify physician/advanced practitioner if interventions unsuccessful or patient reports new pain  Outcome: Progressing     Problem: INFECTION - ADULT  Goal: Absence or prevention of progression during hospitalization  Description  INTERVENTIONS:  - Assess and monitor for signs and symptoms of infection  - Monitor lab/diagnostic results  - Monitor all insertion sites, i e  indwelling lines, tubes, and drains  - Administer medications as ordered  - Instruct and encourage patient and family to use good hand hygiene technique  - Identify and instruct in appropriate isolation precautions for identified infection/condition    Outcome: Progressing     Problem: SAFETY ADULT  Goal: Maintain or return to baseline ADL function  Description  INTERVENTIONS:  -  Assess patient's ability to carry out ADLs; assess patient's baseline for ADL function and identify physical deficits which impact ability to perform ADLs (bathing, care of mouth/teeth, toileting, grooming, dressing, etc )  - Assess/evaluate cause of self-care deficits   - Assess range of motion  - Assess patient's mobility; develop plan if impaired  - Assess patient's need for assistive devices and provide as appropriate  - Encourage maximum independence but intervene and supervise when necessary  - Involve family in performance of ADLs  - Assess for home care needs following discharge   - Consider OT consult to assist with ADL evaluation and planning for discharge  - Provide patient education as appropriate  Outcome: Progressing  Goal: Maintain or return mobility status to optimal level  Description  INTERVENTIONS:  - Assess patient's baseline mobility status (ambulation, transfers, stairs, etc )    - Identify cognitive and physical deficits and behaviors that affect mobility  - Identify mobility aids required to assist with transfers and/or ambulation (gait belt, sit-to-stand, lift, walker, cane, etc )  - [place] fall precautions as indicated by assessment  - Record patient progress and toleration of activity level on Mobility SBAR; progress patient to next Phase/Stage  - Instruct patient to call for assistance with activity based on assessment  - Consider rehabilitation consult to assist with strengthening/weightbearing, etc   Outcome: Progressing     Problem: DISCHARGE PLANNING  Goal: Discharge to home or other facility with appropriate resources  Description  INTERVENTIONS:  - Identify barriers to discharge w/patient and caregiver  - Arrange for needed discharge resources and transportation as appropriate  - Identify discharge learning needs (meds, wound care, etc )  - Refer to Case Management Department for coordinating discharge planning if the patient needs post-hospital services based on physician/advanced practitioner order or complex needs related to functional status, cognitive ability, or social support system   Outcome: Progressing     Problem: Knowledge Deficit  Goal: Patient/family/caregiver demonstrates understanding of disease process, treatment plan, medications, and discharge instructions  Description  Complete learning assessment and assess knowledge base    Interventions:  - Provide teaching at level of understanding  - Provide teaching via preferred learning methods  Outcome: Progressing     Problem: GASTROINTESTINAL - ADULT  Goal: Minimal or absence of nausea and/or vomiting  Description  INTERVENTIONS:  - Administer IV fluids if ordered to ensure adequate hydration  - Maintain NPO status until nausea and vomiting are resolved  - Nasogastric tube if ordered  - Administer ordered antiemetic medications as needed  - Provide nonpharmacologic comfort measures as appropriate  - Advance diet as tolerated, if ordered  - Consider nutrition services referral to assist patient with adequate nutrition and appropriate food choices  Outcome: Progressing  Goal: Maintains or returns to baseline bowel function  Description  INTERVENTIONS:  - Assess bowel function  - Encourage oral fluids to ensure adequate hydration  - Administer IV fluids if ordered to ensure adequate hydration  - Administer ordered medications as needed  - Encourage mobilization and activity  - Consider nutritional services referral to assist patient with adequate nutrition and appropriate food choices  Outcome: Progressing  Goal: Maintains adequate nutritional intake  Description  INTERVENTIONS:  - Monitor percentage of each meal consumed  - Identify factors contributing to decreased intake, treat as appropriate  - Assist with meals as needed  - Monitor I&O, weight, and lab values if indicated  - Obtain nutrition services referral as needed  Outcome: Progressing     Problem: DISCHARGE PLANNING - CARE MANAGEMENT  Goal: Discharge to post-acute care or home with appropriate resources  Description  INTERVENTIONS:  - Conduct assessment to determine patient/family and health care team treatment goals, and need for post-acute services based on payer coverage, community resources, and patient preferences, and barriers to discharge  - Address psychosocial, clinical, and financial barriers to discharge as identified in assessment in conjunction with the patient/family and health care team  - Arrange appropriate level of post-acute services according to patient's   needs and preference and payer coverage in collaboration with the physician and health care team  - Communicate with and update the patient/family, physician, and health care team regarding progress on the discharge plan  - Arrange appropriate transportation to post-acute venues  -[facility] to [facility] on dc  -need auth with insurance to return to SNF on dc   Outcome: Progressing     Problem: GENITOURINARY - ADULT  Goal: Urinary catheter remains patent  Description  INTERVENTIONS:  - Assess patency of urinary catheter  - If patient has a chronic garcia, consider changing catheter if non-functioning  - Follow guidelines for intermittent irrigation of non-functioning urinary catheter  Outcome: Progressing     Problem: METABOLIC, FLUID AND ELECTROLYTES - ADULT  Goal: Electrolytes maintained within normal limits  Description  INTERVENTIONS:  - Monitor labs and assess patient for signs and symptoms of electrolyte imbalances  - Administer electrolyte replacement as ordered  - Monitor response to electrolyte replacements, including repeat lab results as appropriate  - Instruct patient on fluid and nutrition as appropriate  Outcome: Progressing  Goal: Fluid balance maintained  Description  INTERVENTIONS:  - Monitor labs   - Monitor I/O and WT  - Instruct patient on fluid and nutrition as appropriate  - Assess for signs & symptoms of volume excess or deficit  Outcome: Progressing  Goal: Glucose maintained within target range  Description  INTERVENTIONS:  - Monitor Blood Glucose as ordered  - Assess for signs and symptoms of hyperglycemia and hypoglycemia  - Administer ordered medications to maintain glucose within target range  - Assess nutritional intake and initiate nutrition service referral as needed  Outcome: Progressing     Problem: SKIN/TISSUE INTEGRITY - ADULT  Goal: Skin integrity remains intact  Description  INTERVENTIONS  - Identify patients at risk for skin breakdown  - Assess and monitor skin integrity  - Assess and monitor nutrition and hydration status  - Monitor labs (i e  albumin)  - Assess for incontinence   - Turn and reposition patient  - Assist with mobility/ambulation  - Relieve pressure over bony prominences  - Avoid friction and shearing  - Provide appropriate hygiene as needed including keeping skin clean and dry  - Evaluate need for skin moisturizer/barrier cream  - Collaborate with interdisciplinary team (i e  Nutrition, Rehabilitation, etc )   - Patient/family teaching  Outcome: Progressing  Goal: Incision(s), wounds(s) or drain site(s) healing without S/S of infection  Description  INTERVENTIONS  - Assess and document risk factors for skin impairment   - Assess and document dressing, incision, wound bed, drain sites and surrounding tissue  - Consider nutrition services referral as needed  - Oral mucous membranes remain intact  - Provide patient/ family education  Outcome: Progressing     Problem: HEMATOLOGIC - ADULT  Goal: Maintains hematologic stability  Description  INTERVENTIONS  - Assess for signs and symptoms of bleeding or hemorrhage  - Monitor labs  - Administer supportive blood products/factors as ordered and appropriate  Outcome: Progressing     Problem: MUSCULOSKELETAL - ADULT  Goal: Maintain or return mobility to safest level of function  Description  INTERVENTIONS:  - Assess patient's ability to carry out ADLs; assess patient's baseline for ADL function and identify physical deficits which impact ability to perform ADLs (bathing, care of mouth/teeth, toileting, grooming, dressing, etc )  - Assess/evaluate cause of self-care deficits   - Assess range of motion  - Assess patient's mobility  - Assess patient's need for assistive devices and provide as appropriate  - Encourage maximum independence but intervene and supervise when necessary  - Involve family in performance of ADLs  - Assess for home care needs following discharge   - Consider OT consult to assist with ADL evaluation and planning for discharge  - Provide patient education as appropriate  Outcome: Progressing

## 2019-09-30 VITALS
HEART RATE: 90 BPM | TEMPERATURE: 98.1 F | WEIGHT: 149.91 LBS | BODY MASS INDEX: 22.72 KG/M2 | OXYGEN SATURATION: 96 % | HEIGHT: 68 IN | DIASTOLIC BLOOD PRESSURE: 139 MMHG | RESPIRATION RATE: 18 BRPM | SYSTOLIC BLOOD PRESSURE: 201 MMHG

## 2019-09-30 LAB
ABO GROUP BLD: NORMAL
ALBUMIN SERPL BCP-MCNC: 1.6 G/DL (ref 3.5–5)
ALP SERPL-CCNC: 171 U/L (ref 46–116)
ALT SERPL W P-5'-P-CCNC: 15 U/L (ref 12–78)
ANION GAP SERPL CALCULATED.3IONS-SCNC: 10 MMOL/L (ref 4–13)
AST SERPL W P-5'-P-CCNC: 13 U/L (ref 5–45)
BASOPHILS # BLD AUTO: 0.07 THOUSANDS/ΜL (ref 0–0.1)
BASOPHILS NFR BLD AUTO: 1 % (ref 0–1)
BILIRUB SERPL-MCNC: 0.4 MG/DL (ref 0.2–1)
BLD GP AB SCN SERPL QL: NEGATIVE
BUN SERPL-MCNC: 25 MG/DL (ref 5–25)
CALCIUM SERPL-MCNC: 8.4 MG/DL (ref 8.3–10.1)
CHLORIDE SERPL-SCNC: 107 MMOL/L (ref 100–108)
CO2 SERPL-SCNC: 23 MMOL/L (ref 21–32)
CREAT SERPL-MCNC: 2.52 MG/DL (ref 0.6–1.3)
EOSINOPHIL # BLD AUTO: 0.17 THOUSAND/ΜL (ref 0–0.61)
EOSINOPHIL NFR BLD AUTO: 3 % (ref 0–6)
ERYTHROCYTE [DISTWIDTH] IN BLOOD BY AUTOMATED COUNT: 14.4 % (ref 11.6–15.1)
FERRITIN SERPL-MCNC: 688 NG/ML (ref 8–388)
FOLATE SERPL-MCNC: >20 NG/ML (ref 3.1–17.5)
GFR SERPL CREATININE-BSD FRML MDRD: 23 ML/MIN/1.73SQ M
GLUCOSE SERPL-MCNC: 225 MG/DL (ref 65–140)
GLUCOSE SERPL-MCNC: 68 MG/DL (ref 65–140)
GLUCOSE SERPL-MCNC: 77 MG/DL (ref 65–140)
GLUCOSE SERPL-MCNC: 87 MG/DL (ref 65–140)
HCT VFR BLD AUTO: 27.6 % (ref 36.5–49.3)
HGB BLD-MCNC: 8.9 G/DL (ref 12–17)
IMM GRANULOCYTES # BLD AUTO: 0.03 THOUSAND/UL (ref 0–0.2)
IMM GRANULOCYTES NFR BLD AUTO: 1 % (ref 0–2)
IRON SATN MFR SERPL: 42 %
IRON SERPL-MCNC: 108 UG/DL (ref 65–175)
LYMPHOCYTES # BLD AUTO: 1.6 THOUSANDS/ΜL (ref 0.6–4.47)
LYMPHOCYTES NFR BLD AUTO: 24 % (ref 14–44)
MAGNESIUM SERPL-MCNC: 1.7 MG/DL (ref 1.6–2.6)
MCH RBC QN AUTO: 29.3 PG (ref 26.8–34.3)
MCHC RBC AUTO-ENTMCNC: 32.2 G/DL (ref 31.4–37.4)
MCV RBC AUTO: 91 FL (ref 82–98)
MONOCYTES # BLD AUTO: 0.51 THOUSAND/ΜL (ref 0.17–1.22)
MONOCYTES NFR BLD AUTO: 8 % (ref 4–12)
NEUTROPHILS # BLD AUTO: 4.22 THOUSANDS/ΜL (ref 1.85–7.62)
NEUTS SEG NFR BLD AUTO: 63 % (ref 43–75)
NRBC BLD AUTO-RTO: 0 /100 WBCS
PLATELET # BLD AUTO: 165 THOUSANDS/UL (ref 149–390)
PMV BLD AUTO: 10.6 FL (ref 8.9–12.7)
POTASSIUM SERPL-SCNC: 4.1 MMOL/L (ref 3.5–5.3)
PROT SERPL-MCNC: 6.2 G/DL (ref 6.4–8.2)
RBC # BLD AUTO: 3.04 MILLION/UL (ref 3.88–5.62)
RH BLD: POSITIVE
SODIUM SERPL-SCNC: 140 MMOL/L (ref 136–145)
SPECIMEN EXPIRATION DATE: NORMAL
TIBC SERPL-MCNC: 255 UG/DL (ref 250–450)
VIT B12 SERPL-MCNC: 1415 PG/ML (ref 100–900)
WBC # BLD AUTO: 6.6 THOUSAND/UL (ref 4.31–10.16)

## 2019-09-30 PROCEDURE — 83550 IRON BINDING TEST: CPT | Performed by: PHYSICIAN ASSISTANT

## 2019-09-30 PROCEDURE — 97116 GAIT TRAINING THERAPY: CPT

## 2019-09-30 PROCEDURE — 86850 RBC ANTIBODY SCREEN: CPT | Performed by: PHYSICIAN ASSISTANT

## 2019-09-30 PROCEDURE — 82607 VITAMIN B-12: CPT | Performed by: PHYSICIAN ASSISTANT

## 2019-09-30 PROCEDURE — 97530 THERAPEUTIC ACTIVITIES: CPT

## 2019-09-30 PROCEDURE — NC001 PR NO CHARGE: Performed by: SURGERY

## 2019-09-30 PROCEDURE — 83540 ASSAY OF IRON: CPT | Performed by: PHYSICIAN ASSISTANT

## 2019-09-30 PROCEDURE — 82948 REAGENT STRIP/BLOOD GLUCOSE: CPT

## 2019-09-30 PROCEDURE — 82728 ASSAY OF FERRITIN: CPT | Performed by: PHYSICIAN ASSISTANT

## 2019-09-30 PROCEDURE — 80053 COMPREHEN METABOLIC PANEL: CPT | Performed by: PHYSICIAN ASSISTANT

## 2019-09-30 PROCEDURE — 86900 BLOOD TYPING SEROLOGIC ABO: CPT | Performed by: PHYSICIAN ASSISTANT

## 2019-09-30 PROCEDURE — 99239 HOSP IP/OBS DSCHRG MGMT >30: CPT | Performed by: PHYSICIAN ASSISTANT

## 2019-09-30 PROCEDURE — 83735 ASSAY OF MAGNESIUM: CPT | Performed by: PHYSICIAN ASSISTANT

## 2019-09-30 PROCEDURE — 99232 SBSQ HOSP IP/OBS MODERATE 35: CPT | Performed by: INTERNAL MEDICINE

## 2019-09-30 PROCEDURE — 82746 ASSAY OF FOLIC ACID SERUM: CPT | Performed by: PHYSICIAN ASSISTANT

## 2019-09-30 PROCEDURE — 86901 BLOOD TYPING SEROLOGIC RH(D): CPT | Performed by: PHYSICIAN ASSISTANT

## 2019-09-30 PROCEDURE — 85025 COMPLETE CBC W/AUTO DIFF WBC: CPT | Performed by: PHYSICIAN ASSISTANT

## 2019-09-30 RX ORDER — MAGNESIUM SULFATE HEPTAHYDRATE 40 MG/ML
2 INJECTION, SOLUTION INTRAVENOUS ONCE
Status: COMPLETED | OUTPATIENT
Start: 2019-09-30 | End: 2019-09-30

## 2019-09-30 RX ADMIN — ASPIRIN 81 MG 81 MG: 81 TABLET ORAL at 08:15

## 2019-09-30 RX ADMIN — METHENAMINE HIPPURATE 1 G: 1 TABLET ORAL at 08:15

## 2019-09-30 RX ADMIN — HEPARIN SODIUM 5000 UNITS: 5000 INJECTION INTRAVENOUS; SUBCUTANEOUS at 05:10

## 2019-09-30 RX ADMIN — MAGNESIUM SULFATE HEPTAHYDRATE 2 G: 40 INJECTION, SOLUTION INTRAVENOUS at 13:45

## 2019-09-30 RX ADMIN — HEPARIN SODIUM 5000 UNITS: 5000 INJECTION INTRAVENOUS; SUBCUTANEOUS at 13:45

## 2019-09-30 RX ADMIN — SODIUM BICARBONATE 650 MG TABLET 650 MG: at 08:15

## 2019-09-30 RX ADMIN — VITAMIN D, TAB 1000IU (100/BT) 1000 UNITS: 25 TAB at 08:15

## 2019-09-30 RX ADMIN — MAGNESIUM OXIDE TAB 400 MG (240 MG ELEMENTAL MG) 400 MG: 400 (240 MG) TAB at 11:38

## 2019-09-30 RX ADMIN — AMLODIPINE BESYLATE 10 MG: 10 TABLET ORAL at 08:15

## 2019-09-30 RX ADMIN — INSULIN LISPRO 2 UNITS: 100 INJECTION, SOLUTION INTRAVENOUS; SUBCUTANEOUS at 11:42

## 2019-09-30 RX ADMIN — PANTOPRAZOLE SODIUM 40 MG: 40 TABLET, DELAYED RELEASE ORAL at 05:10

## 2019-09-30 NOTE — ASSESSMENT & PLAN NOTE
· Urinary tract infection due to chronic Haile catheter  · Unclear whether this is a true infection versus chronic colonization  · In the setting of abdominal pain and a lactic acidosis,the patient was initiated on ertapenem 500 mg IV every 24 hours on 9/27/19, continued through 9/29  Per Infectious disease ok to discontinue at this time given no symptoms of UTI or systemic infection

## 2019-09-30 NOTE — ASSESSMENT & PLAN NOTE
· Resolved  · The lipase level is now normal   · Patient is tolerating a solid food diet, feels well  · No further nausea or abdominal pain  · NSS IV fluids discontinued on 9/28/19

## 2019-09-30 NOTE — DISCHARGE SUMMARY
Discharge- Sameer Stevenson 1940, [de-identified] y o  male MRN: [id number]    Unit/Bed#: 880-93 Encounter: [id number]    Primary Care Provider: Quan Blankenship DO   Date and time admitted to hospital: 9/25/2019 10:37 PM        * Acute pancreatitis without infection or necrosis  Assessment & Plan  · Resolved  · The lipase level is now normal   · Patient is tolerating a solid food diet, feels well  · No further nausea or abdominal pain  · NSS IV fluids discontinued on 9/28/19  Cholecystostomy tube dysfunction  Assessment & Plan  · Initially presented to the emergency department with an obstructed cholecystostomy tube  · The patient was seen in consultation by General Surgery  · The cholecystostomy tube obstruction is now resolved and is functioning appropriately  · Continue close outpatient follow up  Acute kidney injury [facility])  Assessment & Plan  · Acute kidney injury was present on admission  · Baseline serum creatinine was believed to be 1 4-1 5 mg/dl  · NSS IV fluids discontinued on 9/28/19 per nephrology given patient is eating and drinking well  · New baseline creatinine may actually be closer to 2 5 now, as this seems to be slowly trending up per nephrology  · Would continue to avoid all nephrotoxic agents  · Maintain chronic Haile catheter for chronic urinary retention  · Serial laboratory testing to monitor the patient's renal function and electrolytes - recommend repeat BMP in 3 days  Anemia  Assessment & Plan  · Patient noted to have a hemoglobin of 10 3 on admission which was been slowly trending down, now stable at 8 9   · Heme test stool negative  · Check Iron panel - unremarkable  · vitamin b 12 and folate normal        Hypomagnesemia  Assessment & Plan  · Magnesium level at 1 7 yesterday, repeated at 1 7 again today  · The patient received 2 g IV magnesium yesterday, will replete again today  · Will likely increase with increasing PO intake     · Monitor magnesium level on an outpatient basis  CKD (chronic kidney disease) stage 3, GFR 30-59 ml/min (HCC)  Assessment & Plan  · Acute kidney injury was present on admission  · Baseline serum creatinine was believed to be 1 4-1 5 mg/dl, however, nephrology noted his creatinine seems to be slowly increasing closer in the 2 range, now 2 5, which may be his new baseline  · NSS IV fluids discontinued on 9/28/19 per nephrology recommendations  · Avoid all nephrotoxic agents  · Maintain chronic Haile catheter for chronic urinary retention  · The patient was seen in consultation by Nephrology  · Serial laboratory testing to monitor the patient's renal function and electrolytes      Lactic acidosis  Assessment & Plan  · Resolved with IV fluids    Urinary retention  Assessment & Plan  · Chronic Haile catheter in place  · Continue methenamine hippurate  · Outpatient follow-up with Urology    Urinary tract infection due to extended-spectrum beta lactamase (ESBL) producing Escherichia coli  Assessment & Plan  · Urinary tract infection due to chronic Haile catheter  · Unclear whether this is a true infection versus chronic colonization  · In the setting of abdominal pain and a lactic acidosis,the patient was initiated on ertapenem 500 mg IV every 24 hours on 9/27/19, continued through 9/29  Per Infectious disease ok to discontinue at this time given no symptoms of UTI or systemic infection  Moderate protein-calorie malnutrition (HCC)  Assessment & Plan  Malnutrition Findings:        Malnutrition related to prolonged inadequate intake as evidenced by subcutaneous fat loss in bilateral upper extremities and orbital/cheek region, severe temporal wasting, clavicle/sternum visible, sunken orbitals, inability to meet nutrient needs by mouth  BMI Findings: Body mass index is 22 79 kg/m²  · Dietitian/nutritionist consultation appreciated    · Continue nutritional supplements      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)  Assessment & Plan  · Outpatient follow-up with Dr Carol Ann Judd (Hematology/Oncology) for lanreotide injections every 4 weeks      Type 2 diabetes 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, with long-term current use of insulin [facility])  Assessment & Plan  Lab Results   Component Value Date    HGBA1C 8 1 (H) 06/14/2019       Recent Labs     09/29/19  2026 09/30/19  0747 09/30/19  0804 09/30/19  1140   POCGLU 222* 68 87 225*       Blood Sugar Average: Last 72 hrs:  (P) 174 3125   · Stable while here  · Continue levemir 10 Units SQ QHS          Discharging Physician / Practitioner: Elodia Christensen PA-C  PCP: Phyllis Chicas DO  Admission Date:   Admission Orders (From admission, onward)     Ordered        09/26/19 0230  Inpatient Admission (expected length of stay for this patient Order details is greater than two midnights)  Once                   Discharge Date: 09/30/19    Resolved Problems  Date Reviewed: 9/30/2019    None            Consultations During Hospital Stay:  · General surgery  · Nephrology    Procedures Performed:   · none    Significant Findings / Test Results:   Ct Abdomen Pelvis Wo Contrast  Result Date: 9/26/2019  Impression: Previously seen partially calcified mass over the mesentery measuring 4 3 x 2 5 x 2 2 cm is again noted and is unchanged in appearance  Previously seen left iliac partially calcified metastatic lymph node measuring 1 cm in short axis is again noted unchanged  No CT evidence for pancreatitis  Percutaneous cholecystostomy tube is again noted  Workstation performed: [id number]     Xr Chest 1 View Portable  Result Date: 9/26/2019  Impression: No acute cardiopulmonary findings  Note: I agree with the preliminary interpretation by the ED care provider documented in Epic   Workstation performed: [id number]     Incidental Findings:   · none    Test Results Pending at Discharge (will require follow up):   · none     Outpatient Tests Requested:  · BMP within 3 days     Complications:  none    Reason for Admission: Acute pancreatitis, SILVERIO, malfunctioning cholecystostomy tube  Hospital Course:     Jerry Rosenbaum is a [de-identified] y o  male patient who originally presented to the hospital on 9/25/2019 due to Abdominal Pain (Pt coming from [place] [address] home reports lower right quadrant pain after lunch today which has resolved 1 hr ago after taking pain medication  Also, pt has ofelia drain on RUQ not draining or able to flush per staff and was given instruction to send pt to ED per evaluation today  denies fevers/n/v)        Please see above list of diagnoses and related plan for additional information  Condition at Discharge: good     Discharge Day Visit / Exam:   Subjective:  Patient is feeling well today  Tolerating a regular diet well, good appetite  Abdominal pain has resolved  Vitals: Blood Pressure: 139/67 (09/30/19 0642)  Pulse: 77 (09/30/19 0642)  Temperature: 98 3 °F (36 8 °C) (09/30/19 0642)  Temp Source: Oral (09/30/19 7034)  Respirations: 18 (09/30/19 5603)  Height: 5' 8" (172 7 cm) (09/27/19 1146)  Weight - Scale: 68 kg (149 lb 14 6 oz) (09/27/19 1146)  SpO2: 100 % (09/30/19 6119)  Exam:   Physical Exam   Constitutional: He appears well-developed  No distress  HENT:   Head: Normocephalic  Mouth/Throat: Oropharynx is clear and moist    Neck: Neck supple  Cardiovascular: Normal rate, regular rhythm and normal heart sounds  No murmur heard  Pulmonary/Chest: Effort normal and breath sounds normal  No respiratory distress  He has no wheezes  Abdominal: Soft  Bowel sounds are normal  He exhibits no distension and no mass  There is no tenderness  There is no guarding  Genitourinary:   Genitourinary Comments: Haile catheter intact draining clear yellow urine  Musculoskeletal: He exhibits no edema  Neurological: He is alert  Confused slightly about time and location  Skin: Skin is warm and dry  He is not diaphoretic  There is pallor     Nursing note and vitals reviewed  Discharge instructions/Information to patient and family:   See after visit summary for information provided to patient and family  Provisions for Follow-Up Care:  See after visit summary for information related to follow-up care and any pertinent home health orders  Disposition:     Other: resume short term rehab at [address] [place] [address] Readmission: no     Discharge Statement:  I spent 45 minutes discharging the patient  This time was spent on the day of discharge  I had direct contact with the patient on the day of discharge  Greater than 50% of the total time was spent examining patient, answering all patient questions, arranging and discussing plan of care with patient as well as directly providing post-discharge instructions  Additional time then spent on discharge activities  Discharge Medications:  See after visit summary for reconciled discharge medications provided to patient and family        ** Please Note: This note has been constructed using a voice recognition system **

## 2019-09-30 NOTE — ASSESSMENT & PLAN NOTE
· Patient noted to have a hemoglobin of 10 3 on admission which was been slowly trending down, now stable at 8 9   · Heme test stool negative  · Check Iron panel - unremarkable     · vitamin b 12 and folate normal

## 2019-09-30 NOTE — ASSESSMENT & PLAN NOTE
· Acute kidney injury was present on admission  · Baseline serum creatinine was believed to be 1 4-1 5 mg/dl  · NSS IV fluids discontinued on 9/28/19 per nephrology given patient is eating and drinking well  · New baseline creatinine may actually be closer to 2 5 now, as this seems to be slowly trending up per nephrology  · Would continue to avoid all nephrotoxic agents  · Maintain chronic Haile catheter for chronic urinary retention  · Serial laboratory testing to monitor the patient's renal function and electrolytes - recommend repeat BMP in 3 days

## 2019-09-30 NOTE — PROGRESS NOTES
Progress Note - Nephrology   Alda Ferrell [de-identified] y o  male MRN: [id number]  Unit/Bed#: 925-94 Encounter: [id number]    A/P:  1  Acute kidney injury: he has not responded to volume restoration  His creatinine has stabilized in the 2 5 range  he has an unremarkable kidney ultrasound  Will continue to follow  2  Chronic kidney disease stage 3 with a baseline creatinine of 1 6 in June of this year  Review of creatinine levels reveal that they have been steadily rising and this may be  A new baseline  3  Proteinuria: he may have underlying diabetic nephropathy  4  Urinary retention with chronic garcia drainage    5  Acute pancreatitis: improving       Follow up reason for today's visit: Acute kidney injury/chronci kidney disease    Acute pancreatitis without infection or necrosis    Patient Active Problem List   Diagnosis    Weakness generalized    Type 2 diabetes 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, with long-term current use of insulin (HCC)    Essential hypertension    Mixed hyperlipidemia    Cardiac disease    Generalized abdominal mass    Hydroureter    Metabolic acidosis    Iron deficiency anemia secondary to inadequate dietary iron intake    Accelerated hypertension    Liver mass    Neuroendocrine tumor    Acute cystitis without hematuria    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)    Acute kidney injury ([address] )    Pressure injury of right heel, unstageable (HCC)    Atherosclerosis of artery of extremity with ulceration (HCC)    Carcinoid syndrome (HCC)    Acute cystitis with hematuria    Chronic indwelling Garcia catheter    Moderate protein-calorie malnutrition (HCC)    Biliary sludge    Urinary tract infection due to extended-spectrum beta lactamase (ESBL) producing Escherichia coli    Malignant neuroendocrine neoplasm (HCC)    Generalized abdominal pain    Urinary retention    Cholecystostomy tube dysfunction    Acute pancreatitis without infection or necrosis    Lactic acidosis    UTI due to extended-spectrum beta lactamase (ESBL) producing Escherichia coli    CKD (chronic kidney disease) stage 3, GFR 30-59 ml/min ([facility])    Hypomagnesemia    Anemia         Subjective:   Feels improved  Denies chest pain, dyspnea, abdominal pain NV or dysuria  His wife is at his bedside and says he is much improved    Objective:     Vitals: Blood pressure 139/67, pulse 77, temperature 98 3 °F (36 8 °C), temperature source Oral, resp  rate 18, height 5' 8" (1 727 m), weight 68 kg (149 lb 14 6 oz), SpO2 100 %  ,Body mass index is 22 79 kg/m²  Weight (last 2 days)     None            Intake/Output Summary (Last 24 hours) at 9/30/2019 0852  Last data filed at 9/30/2019 0819  Gross per 24 hour   Intake 1080 ml   Output 2250 ml   Net -1170 ml     I/O last 3 completed shifts: In: 780 [P O :780]  Out: 3770 [Urine:3750; Drains:20]    Urethral Catheter Latex 16 Fr   (Active)   Reasons to continue Urinary Catheter  Chronic urinary catheter 9/29/2019  8:54 PM   Goal for Removal N/A- chronic garcia 9/29/2019  8:54 PM   Site Assessment Clean;Skin intact 9/29/2019  8:54 PM   Collection Container Standard drainage bag 9/29/2019  8:54 PM   Securement Method Securing device (Describe) 9/29/2019  8:54 PM   Output (mL) 850 mL 9/30/2019  5:10 AM       Physical Exam: /67 (BP Location: Right arm)   Pulse 77   Temp 98 3 °F (36 8 °C) (Oral)   Resp 18   Ht 5' 8" (1 727 m)   Wt 68 kg (149 lb 14 6 oz)   SpO2 100%   BMI 22 79 kg/m²     General Appearance:    Alert, cooperative, no distress, appears stated age   Head:    Normocephalic, without obvious abnormality, atraumatic   Eyes:    Conjunctiva/corneas clear   Ears:    Normal external ears   Nose:   Nares normal, septum midline, mucosa normal, no drainage    or sinus tenderness   Throat:   Lips, mucosa, and tongue normal; teeth and gums normal   Neck:   Supple, symmetrical, trachea midline, no adenopathy;        thyroid:  No enlargement/tenderness/nodules; no carotid    bruit or JVD   Back:     Symmetric, no curvature, ROM normal, no CVA tenderness   Lungs:     Clear to auscultation bilaterally, respirations unlabored   Chest wall:    No tenderness or deformity   Heart:    Regular rate and rhythm, S1 and S2 normal, no murmur, rub   or gallop   Abdomen:     Soft, non-tender, bowel sounds active   Extremities:   Extremities normal, atraumatic, no cyanosis or edema   Skin:   Skin color, texture, turgor normal, no rashes or lesions   Lymph nodes:   Cervical normal   Neurologic:   CNII-XII intact            Lab, Imaging and other studies: I have personally reviewed pertinent labs  CBC:   Lab Results   Component Value Date    WBC 6 60 09/30/2019    HGB 8 9 (L) 09/30/2019    HCT 27 6 (L) 09/30/2019    MCV 91 09/30/2019     09/30/2019    MCH 29 3 09/30/2019    MCHC 32 2 09/30/2019    RDW 14 4 09/30/2019    MPV 10 6 09/30/2019    NRBC 0 09/30/2019     CMP:   Lab Results   Component Value Date    K 4 1 09/30/2019     09/30/2019    CO2 23 09/30/2019    BUN 25 09/30/2019    CREATININE 2 52 (H) 09/30/2019    CALCIUM 8 4 09/30/2019    AST 13 09/30/2019    ALT 15 09/30/2019    ALKPHOS 171 (H) 09/30/2019    EGFR 23 09/30/2019           Results from last 7 days   Lab Units 09/30/19  0510 09/29/19  0514 09/28/19  0510   POTASSIUM mmol/L 4 1 3 9 4 2   CHLORIDE mmol/L 107 109* 113*   CO2 mmol/L 23 22 19*   BUN mg/dL 25 25 27*   CREATININE mg/dL 2 52* 2 56* 2 53*   CALCIUM mg/dL 8 4 7 9* 7 9*   ALK PHOS U/L 171* 151* 172*   ALT U/L 15 14 23   AST U/L 13 11 14         Phosphorus: No results found for: PHOS  Magnesium:   Lab Results   Component Value Date    MG 1 7 09/30/2019     Urinalysis: No results found for: COLORU, CLARITYU, SPECGRAV, PHUR, LEUKOCYTESUR, NITRITE, PROTEINUA, GLUCOSEU, KETONESU, BILIRUBINUR, BLOODU  Ionized Calcium: No results found for: CAION  Coagulation: No results found for: PT, INR, APTT  Troponin: No results found for: TROPONINI  ABG: No results found for: PHART, SOJ0GKX, PO2ART, RYL6DMS, Z2YJFHGV, BEART, SOURCE  Radiology review:     IMAGING  No results found      Current Facility-Administered Medications   Medication Dose Route Frequency    acetaminophen (TYLENOL) tablet 650 mg  650 mg Oral Q6H PRN    albuterol inhalation solution 2 5 mg  2 5 mg Nebulization Q6H PRN    aluminum-magnesium hydroxide-simethicone (MYLANTA) 200-200-20 mg/5 mL oral suspension 30 mL  30 mL Oral Q6H PRN    amLODIPine (NORVASC) tablet 10 mg  10 mg Oral Daily    aspirin chewable tablet 81 mg  81 mg Oral Daily    b complex-vitamin C-folic acid (NEPHROCAPS) capsule 1 capsule  1 capsule Oral Daily With Dinner    cholecalciferol (VITAMIN D3) tablet 1,000 Units  1,000 Units Oral Daily    dextrose 50 % IV solution 25 mL  25 mL Intravenous PRN    docusate sodium (COLACE) capsule 100 mg  100 mg Oral BID PRN    ertapenem (INVanz) 500 mg in sodium chloride 0 9 % 50 mL IVPB  500 mg Intravenous Q24H    heparin (porcine) subcutaneous injection 5,000 Units  5,000 Units Subcutaneous Q8H [facility]    insulin detemir (LEVEMIR) subcutaneous injection 10 Units  10 Units Subcutaneous HS    insulin lispro (HumaLOG) 100 units/mL subcutaneous injection 1-5 Units  1-5 Units Subcutaneous TID AC    insulin lispro (HumaLOG) 100 units/mL subcutaneous injection 1-5 Units  1-5 Units Subcutaneous HS    magnesium oxide (MAG-OX) tablet 400 mg  400 mg Oral Once    methenamine hippurate (HIPREX) tablet 1 g  1 g Oral BID With Meals    ondansetron (ZOFRAN) injection 4 mg  4 mg Intravenous Q6H PRN    pantoprazole (PROTONIX) EC tablet 40 mg  40 mg Oral Early Morning    sodium bicarbonate tablet 650 mg  650 mg Oral BID after meals    tamsulosin (FLOMAX) capsule 0 4 mg  0 4 mg Oral Daily With Dinner     Medications Discontinued During This Encounter   Medication Reason    sodium chloride 0 9 % infusion     colchicine (COLCRYS) tablet 0 6 mg     dextrose 5 % and sodium chloride 0 9 % infusion     sodium chloride 0 9 % infusion        Millicent Montano MD

## 2019-09-30 NOTE — ASSESSMENT & PLAN NOTE
· Acute kidney injury was present on admission  · Baseline serum creatinine was believed to be 1 4-1 5 mg/dl, however, nephrology noted his creatinine seems to be slowly increasing closer in the 2 range, now 2 5, which may be his new baseline  · NSS IV fluids discontinued on 9/28/19 per nephrology recommendations     · Avoid all nephrotoxic agents  · Maintain chronic Haile catheter for chronic urinary retention  · The patient was seen in consultation by Nephrology  · Serial laboratory testing to monitor the patient's renal function and electrolytes

## 2019-09-30 NOTE — ASSESSMENT & PLAN NOTE
Lab Results   Component Value Date    HGBA1C 8 1 (H) 06/14/2019       Recent Labs     09/29/19  2026 09/30/19  0747 09/30/19  0804 09/30/19  1140   POCGLU 222* 68 87 225*       Blood Sugar Average: Last 72 hrs:  (P) 174 3125   · Stable while here    · Continue levemir 10 Units SQ QHS

## 2019-09-30 NOTE — SOCIAL WORK
Physician is ok with family transporting patient to [address]  Family will transport him to [place]  Authorization upadated with [employer] and faxed to [address]  [place] notified that family will transport

## 2019-09-30 NOTE — PHYSICAL THERAPY NOTE
PT treatment Note      09/30/19 1151   Restrictions/Precautions   Other Precautions   (Contact/isolation; Bed Alarm; Chair Alarm;Multiple lines;Telem)   Subjective   Subjective c/o fatigue  Agreeable to therapy   Bed Mobility   Supine to Sit 4  Minimal assistance   Additional items Assist x 1;HOB elevated; Increased time required;Verbal cues   Transfers   Sit to Stand 5  Supervision   Additional items Bedrails;Verbal cues   Stand to Sit 5  Supervision   Additional items Armrests; Verbal cues   Stand pivot 5  Supervision   Additional items Verbal cues   Ambulation/Elevation   Gait pattern Forward Flexion; Short stride   Gait Assistance 5  Supervision   Additional items Assist x 1;Verbal cues   Assistive Device Rolling walker   Distance 120'   Balance   Ambulatory Fair   Endurance Deficit   Endurance Deficit Yes   Activity Tolerance   Activity Tolerance Patient limited by fatigue   Assessment   Prognosis Good   Problem List Decreased strength;Decreased endurance; Impaired balance;Decreased mobility; Decreased safety awareness   Assessment Pt  Currently performing bed mobility, tx and ambulation at (SUP ) x 1 level of function   Utilizing RW with fair balance and stability  In comparison to previous session, Pt  With improvements in activity tolerance  Tolerating increased distance well  Pt is in need of continued activity in PT to improve strength balance endurance mobility transfers and ambulation with return to maximize LOF  From PT/mobility standpoint, recommendation at time of d/c would be return to SNF for STR  in order to promote return to PLOF and independence  Goals   LTG Expiration Date 10/10/19   Plan   Treatment/Interventions Functional transfer training;LE strengthening/ROM; Therapeutic exercise; Endurance training;Bed mobility;Gait training   Progress Progressing toward goals   Recommendation   Recommendation Short-term skilled PT   Pt   OOB in chair   with call bell within reach, scd's connected and turned on and alarm on at end of PT session  Discussed with Feliciano Garza, PT today's treatment and patient's current level of function for care coordination

## 2019-09-30 NOTE — NURSING NOTE
Fingerstick blood sugar at 0747 was 68, patient was asymptomatic  Patient given breakfast  At 0804 fingerstick blood sugar was 87  Patient at that time had eaten about 1/2 of breakfast and remains asymptomatic

## 2019-09-30 NOTE — ASSESSMENT & PLAN NOTE
· Chronic Haile catheter in place  · Continue methenamine hippurate  · Outpatient follow-up with Urology

## 2019-09-30 NOTE — ASSESSMENT & PLAN NOTE
· Initially presented to the emergency department with an obstructed cholecystostomy tube  · The patient was seen in consultation by General Surgery  · The cholecystostomy tube obstruction is now resolved and is functioning appropriately  · Continue close outpatient follow up

## 2019-09-30 NOTE — PROGRESS NOTES
Pancreatitis resolved  Continue percutaneous cholecystostomy tube  No surgical intervention at this time  General surgery will sign off at this time      Edin Tolliver PA-C

## 2019-09-30 NOTE — ASSESSMENT & PLAN NOTE
· Magnesium level at 1 7 yesterday, repeated at 1 7 again today  · The patient received 2 g IV magnesium yesterday, will replete again today  · Will likely increase with increasing PO intake  · Monitor magnesium level on an outpatient basis

## 2019-09-30 NOTE — SOCIAL WORK
Pt is being discharged today  Pt will return to [facility]  I did call [employer] and updated Auth with them  I called Mary Dixon at [facility] and notified her of same  I set up patient to be transported by [employer] for 3:30 pm  Pt was notified that they would be responsible for the cost of the transport which is $50  Pt said they can not afford cost of transport  I will check will physician to see if the family  can transport the patient

## 2019-09-30 NOTE — ASSESSMENT & PLAN NOTE
· Outpatient follow-up with Dr Liane Cobos (Hematology/Oncology) for lanreotide injections every 4 weeks

## 2019-09-30 NOTE — ASSESSMENT & PLAN NOTE
Malnutrition Findings:        Malnutrition related to prolonged inadequate intake as evidenced by subcutaneous fat loss in bilateral upper extremities and orbital/cheek region, severe temporal wasting, clavicle/sternum visible, sunken orbitals, inability to meet nutrient needs by mouth  BMI Findings: Body mass index is 22 79 kg/m²  · Dietitian/nutritionist consultation appreciated    · Continue nutritional supplements

## 2019-10-01 ENCOUNTER — TELEPHONE (OUTPATIENT)
Dept: HEMATOLOGY ONCOLOGY | Facility: CLINIC | Age: 79
End: 2019-10-01

## 2019-10-01 ENCOUNTER — TELEPHONE (OUTPATIENT)
Dept: INFUSION CENTER | Facility: HOSPITAL | Age: 79
End: 2019-10-01

## 2019-10-01 LAB
BACTERIA BLD CULT: NORMAL
BACTERIA BLD CULT: NORMAL

## 2019-10-01 NOTE — UTILIZATION REVIEW
Notification of Discharge  This is a Notification of Discharge from our facility [address]  Please be advised that this patient has been discharge from our facility  Below you will find the admission and discharge date and time including the patients disposition  PRESENTATION DATE: 9/25/2019 10:37 PM  OBS ADMISSION DATE:   IP ADMISSION DATE: 9/26/19 0230   DISCHARGE DATE: 9/30/2019  5:30 PM  DISPOSITION: Non [facility] Non [facility]   Admission Orders listed below:  Admission Orders (From admission, onward)     Ordered        09/26/19 0230  Inpatient Admission (expected length of stay for this patient Order details is greater than two midnights)  Once                   Please contact the UR Department if additional information is required to close this patient's authorization/case  [address]  Utilization Review Department  Phone: [phone]; Fax [phone]  [email or web address]  ATTENTION: Please call with any questions or concerns to [phone]  and carefully listen to the prompts so that you are directed to the right person  Send all requests for admission clinical reviews, approved or denied determinations and any other requests to fax [phone]   All voicemails are confidential

## 2019-10-01 NOTE — TELEPHONE ENCOUNTER
Phoned Kings Castro and informed her that Lanreotide injection appointment for this Friday is being canceled as several Drs have phoned Vanesa Lynn with the adverse effects of Lanreotide  Phoned [facility] and spoke to Isidra Uriostegui RN to cancel the appt

## 2019-10-01 NOTE — TELEPHONE ENCOUNTER
Received a phone call from Dr Colin Hurley Dr  With [facility] to report that Obinna Jordan [birth year] receives monthly lanreotide for neuroendocrine carcinoma  Patient is scheduled for next dose on 10/4/19  Dr Padmini Nunez states patient developed biliary sludge and required a percutaneous coli in early September then developed pancreatitis  Dr Padmini Nunez does not feel patient should continue this drug  And his call back number is [phone] for Dr Gordy Castillo to speak to him

## 2019-10-04 LAB — HEMOCCULT STL QL: NEGATIVE

## 2019-10-14 ENCOUNTER — TELEPHONE (OUTPATIENT)
Dept: SURGERY | Facility: CLINIC | Age: 79
End: 2019-10-14

## 2019-10-14 ENCOUNTER — OFFICE VISIT (OUTPATIENT)
Dept: SURGERY | Facility: CLINIC | Age: 79
End: 2019-10-14

## 2019-10-14 VITALS
RESPIRATION RATE: 18 BRPM | TEMPERATURE: 98.6 F | DIASTOLIC BLOOD PRESSURE: 72 MMHG | SYSTOLIC BLOOD PRESSURE: 140 MMHG | HEART RATE: 74 BPM | BODY MASS INDEX: 22.73 KG/M2 | WEIGHT: 150 LBS | HEIGHT: 68 IN

## 2019-10-14 DIAGNOSIS — K83.8 BILIARY SLUDGE DETERMINED BY ULTRASOUND: Primary | ICD-10-CM

## 2019-10-14 PROBLEM — K81.0 ACUTE ACALCULOUS CHOLECYSTITIS: Status: ACTIVE | Noted: 2019-09-06

## 2019-10-14 PROCEDURE — 99214 OFFICE O/P EST MOD 30 MIN: CPT | Performed by: SPECIALIST

## 2019-10-14 RX ORDER — SORBITOL SOLUTION 70 %
15 SOLUTION, ORAL MISCELLANEOUS DAILY PRN
COMMUNITY
End: 2019-12-12 | Stop reason: HOSPADM

## 2019-10-14 NOTE — ASSESSMENT & PLAN NOTE
Mr Earlis Gottron is an 24-year-old white male known to me from hospitalization at [facility], as well as his subsequent hospitalization at [facility] in [place]  He has multiple severe medical issues including diabetes, chronic renal insufficiency, metastatic neuroendocrine tumor as well as his recent episodes of acute cholecystitis treated with percutaneous cholecystostomy, and his recent episode of acute pancreatitis  He is currently convalescing at [address] [place] [address], and is due to be discharged to home with home care support this week  He has had a percutaneous cholecystostomy in place for the last 5 weeks, and presents to discuss options going forward  I have made it clear that he is extremely high risk for surgery, and the patient at this point is more annoyed with a cholecystostomy tube that he is fearful of another episode cholecystitis or pancreatitis  A reasonable option may be to refer him to interventional Radiology, to see if a tube check can be done, and if feasible to convert the cholecystostomy tube to internal drainage  If the patient in the future wishes to proceed with laparoscopic cholecystectomy, medical clearance for the procedure would be a significant obstacle  I did have the opportunity to discuss his metastatic neuroendocrine tumor, and possible non operative management of that with embolization with his oncologist, Dr Gordy Castillo  It appears that his symptoms are controlled at this point, and that a more aggressive approach was not warranted  I will see him back on an as-needed basis, and will discuss this further with his primary care physician

## 2019-10-14 NOTE — PROGRESS NOTES
Assessment/Plan:    Acute acalculous cholecystitis  Mr Bertha Valdez is an 26-year-old white male known to me from hospitalization at [facility], as well as his subsequent hospitalization at [facility] in [place]  He has multiple severe medical issues including diabetes, chronic renal insufficiency, metastatic neuroendocrine tumor as well as his recent episodes of acute cholecystitis treated with percutaneous cholecystostomy, and his recent episode of acute pancreatitis  He is currently convalescing at [address] [place] [address], and is due to be discharged to home with home care support this week  He has had a percutaneous cholecystostomy in place for the last 5 weeks, and presents to discuss options going forward  I have made it clear that he is extremely high risk for surgery, and the patient at this point is more annoyed with a cholecystostomy tube that he is fearful of another episode cholecystitis or pancreatitis  A reasonable option may be to refer him to interventional Radiology, to see if a tube check can be done, and if feasible to convert the cholecystostomy tube to internal drainage  If the patient in the future wishes to proceed with laparoscopic cholecystectomy, medical clearance for the procedure would be a significant obstacle  I did have the opportunity to discuss his metastatic neuroendocrine tumor, and possible non operative management of that with embolization with his oncologist, Dr Multani Bigger  It appears that his symptoms are controlled at this point, and that a more aggressive approach was not warranted  I will see him back on an as-needed basis, and will discuss this further with his primary care physician  Diagnoses and all orders for this visit:    Biliary sludge determined by ultrasound  -     Cancel: IR consult; Future  -     IR tube check; Future    Other orders  -     sorbitol 70 % solution;  Take 15 mL by mouth daily as needed          Subjective:      Patient ID: Yuko Gilman is a [de-identified] y o  male  Yuko Gilman is an [de-identified] yo WM with DM, renal insuffiencey, metastatic neuroendocrine tumor who recently underwent placement of a percutaneous cholecystostomy for acute acalculous cholecystitis, and subsequently was treated for pancreatitis  He is currently convalescing at [facility], a local [facility] and presents on a litter, via medical transport, to discuss further management  The following portions of the patient's history were reviewed and updated as appropriate: allergies, current medications, past family history, past medical history, past social history, past surgical history and problem list     Review of Systems   Constitutional: Positive for appetite change and fatigue  Negative for chills and fever  HENT: Negative for trouble swallowing  Respiratory: Negative for cough  Cardiovascular: Negative for chest pain and palpitations  Gastrointestinal: Negative for nausea and vomiting  Genitourinary:        Indwelling urinary catheter   Skin: Negative for color change  Psychiatric/Behavioral: Negative for agitation  Objective:      /72   Pulse 74   Temp 98 6 °F (37 °C) (Tympanic)   Resp 18   Ht 5' 8" (1 727 m)   Wt 68 kg (150 lb)   BMI 22 81 kg/m²          Physical Exam   Constitutional: He is oriented to person, place, and time  He appears well-developed  Debilitated elderly white male, supine on stretcher   HENT:   Head: Normocephalic and atraumatic  Eyes: Pupils are equal, round, and reactive to light  No scleral icterus  Neck: Neck supple  Cardiovascular: Normal rate  Pulmonary/Chest: Effort normal and breath sounds normal    Abdominal: Soft  He exhibits no distension  Indwelling cholecystostomy tube with scant, clear bile  Minimal RUQ tenderness, without discernable organomegaly     Genitourinary:   Genitourinary Comments: Clear dilute urine via garcia   Musculoskeletal: He exhibits no edema  Neurological: He is alert and oriented to person, place, and time  Skin: Skin is warm and dry  Psychiatric: He has a normal mood and affect

## 2019-10-15 ENCOUNTER — TELEPHONE (OUTPATIENT)
Dept: INTERVENTIONAL RADIOLOGY/VASCULAR | Facility: HOSPITAL | Age: 79
End: 2019-10-15

## 2019-10-15 ENCOUNTER — OFFICE VISIT (OUTPATIENT)
Dept: HEMATOLOGY ONCOLOGY | Facility: CLINIC | Age: 79
End: 2019-10-15
Payer: COMMERCIAL

## 2019-10-15 ENCOUNTER — TELEPHONE (OUTPATIENT)
Dept: HEMATOLOGY ONCOLOGY | Facility: CLINIC | Age: 79
End: 2019-10-15

## 2019-10-15 VITALS
BODY MASS INDEX: 23.3 KG/M2 | HEART RATE: 75 BPM | DIASTOLIC BLOOD PRESSURE: 64 MMHG | HEIGHT: 68 IN | SYSTOLIC BLOOD PRESSURE: 128 MMHG | WEIGHT: 153.7 LBS | OXYGEN SATURATION: 98 % | TEMPERATURE: 98.2 F | RESPIRATION RATE: 14 BRPM

## 2019-10-15 DIAGNOSIS — C7A.8 NEUROENDOCRINE CARCINOMA METASTATIC TO LIVER (HCC): Primary | ICD-10-CM

## 2019-10-15 DIAGNOSIS — E34.0 CARCINOID SYNDROME (HCC): ICD-10-CM

## 2019-10-15 DIAGNOSIS — C7B.8 NEUROENDOCRINE CARCINOMA METASTATIC TO LIVER (HCC): Primary | ICD-10-CM

## 2019-10-15 DIAGNOSIS — D64.9 ANEMIA, UNSPECIFIED TYPE: ICD-10-CM

## 2019-10-15 PROCEDURE — 99214 OFFICE O/P EST MOD 30 MIN: CPT | Performed by: INTERNAL MEDICINE

## 2019-10-15 RX ORDER — LANREOTIDE ACETATE 120 MG/.5ML
120 INJECTION SUBCUTANEOUS ONCE
Status: CANCELLED | OUTPATIENT
Start: 2019-11-01

## 2019-10-15 NOTE — PROGRESS NOTES
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)  The patient should be restarted on the Lanreotide injection on every 4 week basis at the usual dose of 120 mg sq which most likely is going to improve his diarrhea  I agree that the patient does not seem to be a surgical candidate for cholecystectomy  He should be seen by the interventional radiology team, Dr William Ragland for follow-up for potential internal drainage of the gallbladder instead of percutaneous cholecystotomy  - CBC and differential; Future  - Comprehensive metabolic panel; Future  - Chromogranin A; Future    2  Carcinoid syndrome ([address] )  The patient seems to be symptomatic with the frequent diarrhea after each meal, this is most likely related to his metastatic carcinoid tumor which needs to be treated with Lanreotide  Imodium could be also use on as-needed basis  - CBC and differential; Future  - Comprehensive metabolic panel; Future  - Chromogranin A; Future    3  Anemia, unspecified type  The patient seems to be anemic from multiple etiologies including anemia of chronic disease, anemia of renal dysfunction, etc   We will work him up prior to his next visit to see if we can correct his anemia   - CBC and differential; Future  - Comprehensive metabolic panel; Future  - Iron Panel (Includes Ferritin, Iron Sat%, Iron, and TIBC); Future  - Ferritin; Future  - Vitamin B12; Future  - Folate; Future  - Haptoglobin; Future  - C-reactive protein; Future  - Sedimentation rate, automated; Future  - TSH, 3rd generation with Free T4 reflex; Future  - Occult Blood, Fecal Immunochemical; Future  - Reticulocytes; Future  - Hemolysis Smear; Future        HPI:  The patient came today for a follow-up visit  He was seen yesterday by Dr Charan Zee his surgeon for further evaluation of his acute cholecystitis    The patient does not seem to be a surgical candidate according to Dr Michelle Sanchez  He continues to have percutaneous cholecystostomy which was placed by the interventional radiologist Dr Racheal Lunsford  The patient did not receive his Lanreotide subcutaneous injection last month  He is now complaining about worsening of his diarrhea after each meal   His most recent blood work on the 30th of September showed hemoglobin of 8 9 with normal white cells and platelets  His creatinine is 2 5 with declining albumin of 1 6  He denies bleeding from any sites  He denied abdominal pain  Oncology History    Patient has multiple comorbid conditions including history of coronary artery disease, diabetes mellitus, hypertension, hyperlipidemia, and more recently metastatic neuroendocrine tumor  The patient was seen in consultation when he was in the hospital on the 5th February 2019 at that time he had significant weakness status post vasovagal syncope   He was evaluated with a CT scan of the abdomen and pelvis on the 23rd January 2019 which showed partially calcified mesenteric mass with multiple liver lesions compatible most likely with carcinoid malignancy   He also was found to have bilateral hydronephrosis and severe bladder wall thickening with prostatomegaly  The patient then had a core biopsy from 1 of the liver lesions on the 24th of January which showed well-differentiated neuroendocrine tumor grade 1-2 with Ki 67 of 3-5%       His chromogranin level  February 2019 was 273   His 24 hour urine for HIAA was 48 which is above normal   The patient was started on Lanreotide in March 2019  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 ([facility] )    1/24/2019 Initial Diagnosis     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 ([facility] )         Interval history:    ROS: Review of Systems   Constitutional: Positive for fatigue  Negative for appetite change, diaphoresis and fever     HENT: Negative for congestion, dental problem, facial swelling, hearing loss, tinnitus and trouble swallowing  Eyes: Negative for visual disturbance  Respiratory: Negative for cough, chest tightness, shortness of breath and wheezing  Cardiovascular: Negative for chest pain and leg swelling  Gastrointestinal: Positive for diarrhea  Negative for abdominal distention, abdominal pain, blood in stool, constipation, nausea and vomiting  Genitourinary: Negative for dysuria, hematuria and urgency  Musculoskeletal: Negative for arthralgias, myalgias and neck pain  Skin: Negative  Negative for color change, pallor, rash and wound  Neurological: Positive for weakness (Laying in a stretcher) and numbness  Negative for dizziness and headaches  Hematological: Negative for adenopathy  Psychiatric/Behavioral: Negative for agitation, behavioral problems, confusion, hallucinations, self-injury and sleep disturbance  The patient is not nervous/anxious and is not hyperactive          Past Medical History:   Diagnosis Date    Acute pancreatitis without infection or necrosis 9/26/2019    Anemia of chronic renal failure     BPH (benign prostatic hyperplasia)     Cancer (HCC)     liver cancer    Cardiac disease     Chronic kidney disease, stage 3 (HCC)     Coronary artery disease     Diabetes mellitus ([address] )     DJD (degenerative joint disease)     Essential hypertension     Gait disturbance     Generalized weakness     GERD (gastroesophageal reflux disease)     Gout     History of transfusion     Hydronephrosis     Hyperlipidemia     Hypertension     Pressure injury of skin     Proteinuria     Renal disorder     Retention of urine     Thrombophlebitis migrans     Type 2 diabetes mellitus (HCC)     Type 2 diabetes 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, with long-term current use of insulin ([address] ) 1/23/2019       Past Surgical History:   Procedure Laterality Date    CORONARY ANGIOPLASTY WITH STENT PLACEMENT      IR IMAGE GUIDED BIOPSY/ASPIRATION LIVER  1/24/2019    IR TUBE PLACEMENT ROQUE  9/6/2019       Social History     Socioeconomic History    Marital status:       Spouse name: None    Number of children: None    Years of education: None    Highest education level: None   Occupational History    None   Social Needs    Financial resource strain: None    Food insecurity:     Worry: None     Inability: None    Transportation needs:     Medical: None     Non-medical: None   Tobacco Use    Smoking status: Never Smoker    Smokeless tobacco: Never Used   Substance and Sexual Activity    Alcohol use: Never     Frequency: Never    Drug use: Never    Sexual activity: Never   Lifestyle    Physical activity:     Days per week: None     Minutes per session: None    Stress: None   Relationships    Social connections:     Talks on phone: None     Gets together: None     Attends [employer] service: None     Active member of club or organization: None     Attends meetings of clubs or organizations: None     Relationship status: None    Intimate partner violence:     Fear of current or ex partner: None     Emotionally abused: None     Physically abused: None     Forced sexual activity: None   Other Topics Concern    None   Social History Narrative    None       Family History   Problem Relation Age of Onset    Cancer Mother     Hypertension Father     Cancer Brother     Cancer Maternal Grandmother     Cancer Paternal Aunt        No Known Allergies      Current Outpatient Medications:     acetaminophen (TYLENOL) 325 mg tablet, Take by mouth as needed, Disp: , Rfl:     amLODIPine (NORVASC) 10 mg tablet, Take 10 mg by mouth daily, Disp: , Rfl:     aspirin 81 MG tablet, Take 81 mg by mouth daily, Disp: , Rfl:     b complex-vitamin C-folic acid (NEPHROCAPS) 1 mg capsule, Take 1 capsule by mouth daily with dinner, Disp: 60 capsule, Rfl: 0    cholecalciferol (VITAMIN D3) 1,000 units tablet, Take 1,000 Units by mouth daily, Disp: , Rfl:     colchicine (COLCRYS) 0 6 mg tablet, Take 0 6 mg by mouth daily, Disp: , Rfl:     insulin detemir (LEVEMIR) 100 units/mL subcutaneous injection, Inject 10 Units under the skin daily at bedtime, Disp: , Rfl:     insulin lispro (HumaLOG) 100 units/mL injection, Inject under the skin 3 (three) times a day before meals, Disp: , Rfl:     methenamine hippurate (HIPREX) 1 g tablet, Take 1 g by mouth 2 (two) times a day with meals, Disp: , Rfl:     oxyCODONE-acetaminophen (PERCOCET) 5-325 mg per tablet, Take 1 tablet by mouth every 4 (four) hours as needed for moderate pain, Disp: , Rfl:     pantoprazole (PROTONIX) 40 mg tablet, Take 1 tablet (40 mg total) by mouth daily in the early morning, Disp: , Rfl: 0    simethicone (MYLICON) 80 mg chewable tablet, Chew 80 mg every 6 (six) hours as needed for flatulence, Disp: , Rfl:     sodium bicarbonate 650 mg tablet, Take 1 tablet (650 mg total) by mouth 2 (two) times daily after meals, Disp: , Rfl: 0    sorbitol 70 % solution, Take 15 mL by mouth daily as needed, Disp: , Rfl:     tamsulosin (FLOMAX) 0 4 mg, Take 1 capsule (0 4 mg total) by mouth daily with dinner, Disp: , Rfl: 0      Physical Exam:  /64 (BP Location: Left arm, Patient Position: Sitting, Cuff Size: Adult)   Pulse 75   Temp 98 2 °F (36 8 °C)   Resp 14   Ht 5' 8" (1 727 m) Comment: unable to obtain-pt on stretcher  Wt 69 7 kg (153 lb 11 2 oz) Comment: unable to obtain-pt on stretcher  SpO2 98%   BMI 23 37 kg/m²     Physical Exam   Constitutional: He is oriented to person, place, and time  He appears well-developed and well-nourished  HENT:   Head: Normocephalic and atraumatic  Nose: Nose normal    Mouth/Throat: Oropharynx is clear and moist    Eyes: Pupils are equal, round, and reactive to light  Conjunctivae and EOM are normal  Right eye exhibits no discharge  Left eye exhibits no discharge  No scleral icterus  Neck: Normal range of motion  Neck supple   No tracheal deviation present  No thyromegaly present  Cardiovascular: Normal rate, regular rhythm and normal heart sounds  Exam reveals no friction rub  No murmur heard  Pulmonary/Chest: Effort normal and breath sounds normal  No respiratory distress  He has no wheezes  He has no rales  He exhibits no tenderness  Abdominal: Soft  Bowel sounds are normal  He exhibits no distension and no mass  There is no hepatosplenomegaly, splenomegaly or hepatomegaly  There is no tenderness  There is no rebound and no guarding  percutaneous cholecystostomy on the right upper quadrant area   Musculoskeletal: Normal range of motion  He exhibits no edema, tenderness or deformity  Lymphadenopathy:     He has no cervical adenopathy  Neurological: He is alert and oriented to person, place, and time  He has normal reflexes  He displays normal reflexes  No cranial nerve deficit  Coordination normal    Skin: Skin is warm and dry  No rash noted  No erythema  No pallor  Psychiatric: He has a normal mood and affect  His behavior is normal  Judgment and thought content normal          Labs:  Lab Results   Component Value Date    WBC 6 60 09/30/2019    HGB 8 9 (L) 09/30/2019    HCT 27 6 (L) 09/30/2019    MCV 91 09/30/2019     09/30/2019     Lab Results   Component Value Date    K 4 1 09/30/2019     09/30/2019    CO2 23 09/30/2019    BUN 25 09/30/2019    CREATININE 2 52 (H) 09/30/2019    GLUF 346 (H) 08/05/2019    CALCIUM 8 4 09/30/2019    AST 13 09/30/2019    ALT 15 09/30/2019    ALKPHOS 171 (H) 09/30/2019    EGFR 23 09/30/2019     No results found for: TSH    Patient voiced understanding and agreement in the above discussion  Aware to contact our office with questions/symptoms in the interim

## 2019-10-15 NOTE — TELEPHONE ENCOUNTER
Patient missed f/u apt today 10/15/19 at 10:00 w/Dr Mireya Jordan  Called pt, and someone answered, however, no one would talk on the other end  Whoever, answered then hung up the call

## 2019-10-15 NOTE — TELEPHONE ENCOUNTER
Called to schedule biliary tube check for patient  Patient scheduled for Monday 10/21/19 at 1000  Consult complete

## 2019-10-21 ENCOUNTER — TRANSCRIBE ORDERS (OUTPATIENT)
Dept: LAB | Facility: CLINIC | Age: 79
End: 2019-10-21

## 2019-10-21 ENCOUNTER — HOSPITAL ENCOUNTER (OUTPATIENT)
Dept: INTERVENTIONAL RADIOLOGY/VASCULAR | Facility: HOSPITAL | Age: 79
Discharge: HOME | End: 2019-10-21
Attending: SPECIALIST | Admitting: RADIOLOGY
Payer: COMMERCIAL

## 2019-10-21 ENCOUNTER — LAB (OUTPATIENT)
Dept: LAB | Facility: CLINIC | Age: 79
End: 2019-10-21
Payer: COMMERCIAL

## 2019-10-21 ENCOUNTER — TELEPHONE (OUTPATIENT)
Dept: INTERVENTIONAL RADIOLOGY/VASCULAR | Facility: HOSPITAL | Age: 79
End: 2019-10-21

## 2019-10-21 VITALS
OXYGEN SATURATION: 99 % | RESPIRATION RATE: 16 BRPM | SYSTOLIC BLOOD PRESSURE: 156 MMHG | HEART RATE: 88 BPM | DIASTOLIC BLOOD PRESSURE: 76 MMHG

## 2019-10-21 DIAGNOSIS — E11.21 DIABETIC GLOMERULOPATHY (HCC): ICD-10-CM

## 2019-10-21 DIAGNOSIS — K83.8 BILIARY SLUDGE DETERMINED BY ULTRASOUND: ICD-10-CM

## 2019-10-21 DIAGNOSIS — N17.9 ACUTE RENAL FAILURE, UNSPECIFIED ACUTE RENAL FAILURE TYPE (HCC): ICD-10-CM

## 2019-10-21 DIAGNOSIS — N18.30 CHRONIC KIDNEY DISEASE, STAGE III (MODERATE) (HCC): ICD-10-CM

## 2019-10-21 DIAGNOSIS — N17.9 ACUTE RENAL FAILURE, UNSPECIFIED ACUTE RENAL FAILURE TYPE (HCC): Primary | ICD-10-CM

## 2019-10-21 LAB
ANION GAP SERPL CALCULATED.3IONS-SCNC: 10 MMOL/L (ref 4–13)
BASOPHILS # BLD AUTO: 0.07 THOUSANDS/ΜL (ref 0–0.1)
BASOPHILS NFR BLD AUTO: 1 % (ref 0–1)
BUN SERPL-MCNC: 32 MG/DL (ref 5–25)
CALCIUM SERPL-MCNC: 8.8 MG/DL (ref 8.3–10.1)
CHLORIDE SERPL-SCNC: 106 MMOL/L (ref 100–108)
CO2 SERPL-SCNC: 26 MMOL/L (ref 21–32)
CREAT SERPL-MCNC: 2.46 MG/DL (ref 0.6–1.3)
EOSINOPHIL # BLD AUTO: 0.08 THOUSAND/ΜL (ref 0–0.61)
EOSINOPHIL NFR BLD AUTO: 1 % (ref 0–6)
ERYTHROCYTE [DISTWIDTH] IN BLOOD BY AUTOMATED COUNT: 14.4 % (ref 11.6–15.1)
GFR SERPL CREATININE-BSD FRML MDRD: 24 ML/MIN/1.73SQ M
GLUCOSE SERPL-MCNC: 197 MG/DL (ref 65–140)
HCT VFR BLD AUTO: 31.2 % (ref 36.5–49.3)
HGB BLD-MCNC: 9.7 G/DL (ref 12–17)
IMM GRANULOCYTES # BLD AUTO: 0.02 THOUSAND/UL (ref 0–0.2)
IMM GRANULOCYTES NFR BLD AUTO: 0 % (ref 0–2)
LYMPHOCYTES # BLD AUTO: 1.12 THOUSANDS/ΜL (ref 0.6–4.47)
LYMPHOCYTES NFR BLD AUTO: 17 % (ref 14–44)
MCH RBC QN AUTO: 29.5 PG (ref 26.8–34.3)
MCHC RBC AUTO-ENTMCNC: 31.1 G/DL (ref 31.4–37.4)
MCV RBC AUTO: 95 FL (ref 82–98)
MONOCYTES # BLD AUTO: 0.55 THOUSAND/ΜL (ref 0.17–1.22)
MONOCYTES NFR BLD AUTO: 9 % (ref 4–12)
NEUTROPHILS # BLD AUTO: 4.6 THOUSANDS/ΜL (ref 1.85–7.62)
NEUTS SEG NFR BLD AUTO: 72 % (ref 43–75)
NRBC BLD AUTO-RTO: 0 /100 WBCS
PLATELET # BLD AUTO: 161 THOUSANDS/UL (ref 149–390)
PMV BLD AUTO: 11.3 FL (ref 8.9–12.7)
POTASSIUM SERPL-SCNC: 4.2 MMOL/L (ref 3.5–5.3)
RBC # BLD AUTO: 3.29 MILLION/UL (ref 3.88–5.62)
SODIUM SERPL-SCNC: 142 MMOL/L (ref 136–145)
WBC # BLD AUTO: 6.44 THOUSAND/UL (ref 4.31–10.16)

## 2019-10-21 PROCEDURE — 85025 COMPLETE CBC W/AUTO DIFF WBC: CPT

## 2019-10-21 PROCEDURE — 36415 COLL VENOUS BLD VENIPUNCTURE: CPT

## 2019-10-21 PROCEDURE — 47531 INJECTION FOR CHOLANGIOGRAM: CPT

## 2019-10-21 PROCEDURE — 47531 INJECTION FOR CHOLANGIOGRAM: CPT | Performed by: RADIOLOGY

## 2019-10-21 PROCEDURE — 80048 BASIC METABOLIC PNL TOTAL CA: CPT

## 2019-10-21 RX ORDER — LANREOTIDE ACETATE 120 MG/.5ML
120 INJECTION SUBCUTANEOUS ONCE
Status: CANCELLED | OUTPATIENT
Start: 2019-10-22

## 2019-10-21 RX ADMIN — IOHEXOL 20 ML: 300 INJECTION, SOLUTION INTRAVENOUS at 10:33

## 2019-10-21 NOTE — TELEPHONE ENCOUNTER
Called patient to reschedule at [place] due to equipment failure at [facility]  Patient to arrive at 1000

## 2019-10-21 NOTE — PROCEDURES
Patient with cholecystostomy tube that is about 3week-old  The plan is for cholecystectomy  He is here today for possible initiation of capping of the tube  Contrast was injected via the existing tube  This demonstrated multiple gallstones  There was no emptying of the gallbladder  The patient was turned into a left lateral decubitus position  There was still no emptying of the gallbladder  Impression:  Persistent gallstones  Persistent obstruction of cystic duct  Tube was left on external drainage

## 2019-10-21 NOTE — DISCHARGE INSTRUCTIONS
[address]  Interventional Radiology  Dr Jeffery Litter: [phone] (M-F 7:30am - 4:00pm)  [facility]: [phone] (Off hours or no answer)       [address]  after your procedure:    Resume your normal diet  Small sips of flat soda will help with nausea  1  The properly functioning catheter should be forward flushed once (1x) daily with 10ml of normal saline using clean technique  You will be given a prescription for flushes  To flush the tube, clean both connections with alcohol swab  Twist off the drainage bag/ bulb  tubing and twist the saline syringe into the drainage tube and flush  Remove the syringe and twist the drainage bag / bulb tubing tubing back on     2  The drainage bag/bulb may be emptied as necessary  Keep a record of the amount of fluid you drain from your tube  This should be done with clean technique as well  3  A fresh dressing should be applied daily over the tube insertion site  4  As the tube is secured to the skin with only a suture,try not to pull on your tube  Tub baths are not permitted  Showers are permitted if the patient's skin entry site is prevented from getting wet  Similarly, washcloth "baths" are acceptable  Contact Interventional Radiology at [phone] [facility] PATIENTS: Contact Interventional Radiology at [phone]) Constantino Zarco PATIENTS: Contact Interventional Radiology at [phone]) if:    1  Leakage or large amounts of liquid around the catheter  2  Fever of 101 degrees lasting several hours without other obvious cause (such as sore throat, flu, etc)  3  Persistent nausea or vomiting  4  Diminished drainage, which may be associated with pressure or pain  Or when the     drainage from your tube is less than 10mls for 48 hours  5  Catheter pulled back or falls out  The following pharmacies carry the flush syringes         [address] [place] [address]                         [address] [facility]  Phone [phone]            Phone [phone] [facility]                                [phone]  [address] [place] [address] [facility]                      [address] [facility]  Phone [phone]            Phone [phone]                      [facility]                                                                                                          [phone]  [facility]  [facility] [address]    [address]  Phone [phone]

## 2019-10-21 NOTE — SEDATION DOCUMENTATION
Tube check complete by Dr Yesenia Childs  Patient is not draining internally  Drain staying in place as per Dr Yesenia Childs

## 2019-10-22 ENCOUNTER — HOSPITAL ENCOUNTER (OUTPATIENT)
Dept: INFUSION CENTER | Facility: HOSPITAL | Age: 79
Discharge: HOME | End: 2019-10-22
Payer: COMMERCIAL

## 2019-10-22 VITALS
HEART RATE: 86 BPM | SYSTOLIC BLOOD PRESSURE: 137 MMHG | TEMPERATURE: 97.9 F | DIASTOLIC BLOOD PRESSURE: 68 MMHG | RESPIRATION RATE: 18 BRPM | OXYGEN SATURATION: 99 %

## 2019-10-22 DIAGNOSIS — E34.0 CARCINOID SYNDROME (HCC): ICD-10-CM

## 2019-10-22 DIAGNOSIS — C7A.8 MALIGNANT NEUROENDOCRINE NEOPLASM (HCC): Primary | ICD-10-CM

## 2019-10-22 DIAGNOSIS — C7A.8 NEUROENDOCRINE CARCINOMA METASTATIC TO LIVER (HCC): ICD-10-CM

## 2019-10-22 DIAGNOSIS — D3A.8 NEUROENDOCRINE TUMOR: ICD-10-CM

## 2019-10-22 DIAGNOSIS — C7B.8 NEUROENDOCRINE CARCINOMA METASTATIC TO LIVER (HCC): ICD-10-CM

## 2019-10-22 PROCEDURE — 96372 THER/PROPH/DIAG INJ SC/IM: CPT

## 2019-10-22 RX ORDER — LANREOTIDE ACETATE 120 MG/.5ML
120 INJECTION SUBCUTANEOUS ONCE
Status: CANCELLED | OUTPATIENT
Start: 2019-11-19

## 2019-10-22 RX ORDER — LANREOTIDE ACETATE 120 MG/.5ML
120 INJECTION SUBCUTANEOUS ONCE
Status: COMPLETED | OUTPATIENT
Start: 2019-10-22 | End: 2019-10-22

## 2019-10-22 RX ADMIN — LANREOTIDE ACETATE 120 MG: 120 INJECTION SUBCUTANEOUS at 13:19

## 2019-10-22 NOTE — PLAN OF CARE
Problem: Potential for Falls  Goal: Patient will remain free of falls  Description  INTERVENTIONS:  - Assess patient frequently for physical needs  -  Identify cognitive and physical deficits and behaviors that affect risk of falls  -  [place] fall precautions as indicated by assessment   - Educate patient/family on patient safety including physical limitations  - Instruct patient to call for assistance with activity based on assessment  - Modify environment to reduce risk of injury  - Consider OT/PT consult to assist with strengthening/mobility  Outcome: Progressing     Problem: INFECTION - ADULT  Goal: Absence or prevention of progression during hospitalization  Description  INTERVENTIONS:  - Assess and monitor for signs and symptoms of infection  - Monitor lab/diagnostic results  - Monitor all insertion sites, i e  indwelling lines, tubes, and drains  - Monitor endotracheal if appropriate and nasal secretions for changes in amount and color  - [place] appropriate cooling/warming therapies per order  - Administer medications as ordered  - Instruct and encourage patient and family to use good hand hygiene technique  - Identify and instruct in appropriate isolation precautions for identified infection/condition  Outcome: Progressing     Problem: Knowledge Deficit  Goal: Patient/family/caregiver demonstrates understanding of disease process, treatment plan, medications, and discharge instructions  Description  Complete learning assessment and assess knowledge base    Interventions:  - Provide teaching at level of understanding  - Provide teaching via preferred learning methods  Outcome: Progressing

## 2019-10-25 ENCOUNTER — TELEPHONE (OUTPATIENT)
Dept: NEPHROLOGY | Facility: CLINIC | Age: 79
End: 2019-10-25

## 2019-10-25 NOTE — TELEPHONE ENCOUNTER
----- Message from Elsie Lyn DO sent at 10/25/2019  4:21 PM EDT -----  Kidney fn is stable, hgb is a little better compared to last check, will review at appt in Nov

## 2019-10-28 DIAGNOSIS — Z79.4 TYPE 2 DIABETES MELLITUS WITH HYPERGLYCEMIA, WITH LONG-TERM CURRENT USE OF INSULIN (HCC): Primary | ICD-10-CM

## 2019-10-28 DIAGNOSIS — E11.65 TYPE 2 DIABETES MELLITUS WITH HYPERGLYCEMIA, WITH LONG-TERM CURRENT USE OF INSULIN (HCC): Primary | ICD-10-CM

## 2019-11-01 ENCOUNTER — HOSPITAL ENCOUNTER (OUTPATIENT)
Dept: INFUSION CENTER | Facility: HOSPITAL | Age: 79
Discharge: HOME | End: 2019-11-01

## 2019-11-04 ENCOUNTER — OFFICE VISIT (OUTPATIENT)
Dept: SURGERY | Facility: CLINIC | Age: 79
End: 2019-11-04
Payer: COMMERCIAL

## 2019-11-04 ENCOUNTER — TELEPHONE (OUTPATIENT)
Dept: INTERVENTIONAL RADIOLOGY/VASCULAR | Facility: HOSPITAL | Age: 79
End: 2019-11-04

## 2019-11-04 VITALS
HEIGHT: 68 IN | TEMPERATURE: 97.1 F | WEIGHT: 159 LBS | HEART RATE: 95 BPM | BODY MASS INDEX: 24.1 KG/M2 | SYSTOLIC BLOOD PRESSURE: 132 MMHG | DIASTOLIC BLOOD PRESSURE: 64 MMHG

## 2019-11-04 DIAGNOSIS — K83.8 BILIARY SLUDGE DETERMINED BY ULTRASOUND: Primary | ICD-10-CM

## 2019-11-04 DIAGNOSIS — K81.0 ACUTE ACALCULOUS CHOLECYSTITIS: ICD-10-CM

## 2019-11-04 PROCEDURE — 99213 OFFICE O/P EST LOW 20 MIN: CPT | Performed by: SPECIALIST

## 2019-11-04 NOTE — PATIENT INSTRUCTIONS
Interventional Radiology will call with an appointment  Follow up after the IR visit, and follow up with Dr Valeria Larsen on a routine basis

## 2019-11-04 NOTE — ASSESSMENT & PLAN NOTE
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, as well as his overall frailty  Patient presents with a new complaint of blood-tinged drainage in the cholecystostomy tube, and draining into the dressing around the tube site  He has no complaints of fever, chills, or abdominal pain  There been no symptoms of jaundice  On exam his abdomen is soft, the cholecystostomy tube is draining blood-tinged serous fluid, and there is also light serosanguineous drainage on the drain dressing  I discussed this with Dr Darline Cheadle, who will contact the patient and arrange for a tube check  This also leads to renewed discussion regarding embolization of the metastatic neuroendocrine tumor, as this may be a sentinel event prior to hemorrhaging from the tumor  This would be fodder for a discussion between Dr Doak Peabody and Dr Darline Cheadle  I remain open to the possibility of performing cholecystectomy, if this can be accomplished safely  I will see the patient back after his study with interventional radiology, and he continues to follow with outpatient Oncology

## 2019-11-04 NOTE — PROGRESS NOTES
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, as well as his overall frailty  Patient presents with a new complaint of blood-tinged drainage in the cholecystostomy tube, and draining into the dressing around the tube site  He has no complaints of fever, chills, or abdominal pain  There been no symptoms of jaundice  On exam his abdomen is soft, the cholecystostomy tube is draining blood-tinged serous fluid, and there is also light serosanguineous drainage on the drain dressing  I discussed this with Dr Dagmar Paul, who will contact the patient and arrange for a tube check  This also leads to renewed discussion regarding embolization of the metastatic neuroendocrine tumor, as this may be a sentinel event prior to hemorrhaging from the tumor  This would be fodder for a discussion between Dr Chad Arambula and Dr Dagmar Paul  I remain open to the possibility of performing cholecystectomy, if this can be accomplished safely  I will see the patient back after his study with interventional radiology, and he continues to follow with outpatient Oncology  Diagnoses and all orders for this visit:    Biliary sludge determined by ultrasound  -     IR consult; Future    Acute acalculous cholecystitis          Subjective:      Patient ID: Corin Greenwood is a [de-identified] y o  male      The patient is an 24-year-old white male who has metastatic neuroendocrine tumor as well as multiple medical issues including diabetes, renal insufficiency, a recent episode of pancreatitis, who has had a percutaneous cholecystostomy for the last 6 weeks for non operative treatment of acute cholecystitis, 2 weeks prior he had a tube check which demonstrated gallstones and occlusion of the cystic duct  He is now having bleeding in the cholecystostomy, as well as bloody drainage around the tubing as manifested by painless serosanguineous fluid  The following portions of the patient's history were reviewed and updated as appropriate: allergies, current medications, past family history, past medical history, past social history, past surgical history and problem list     Review of Systems   Constitutional: Negative for chills and fever  Appetite change: Poor appetite  Unexpected weight change:  weight has been stable  HENT: Negative for trouble swallowing  Gastrointestinal: Negative for abdominal distention, abdominal pain, blood in stool, constipation and diarrhea  Genitourinary:        Chronic indwelling Haile   Skin: Negative for color change  Objective:      /64 (BP Location: Left arm, Patient Position: Sitting, Cuff Size: Standard)   Pulse 95   Temp (!) 97 1 °F (36 2 °C) (Tympanic)   Ht 5' 8" (1 727 m)   Wt 72 1 kg (159 lb)   BMI 24 18 kg/m²          Physical Exam   Constitutional: He is oriented to person, place, and time  Frail elderly white male, ambulates with the assistance of a walker   HENT:   Head: Normocephalic and atraumatic  Eyes: No scleral icterus  Neck: Neck supple  Abdominal: Soft  He exhibits no distension  There is no tenderness  With cholecystostomy tube, with blood-tinged serosanguineous drainage, no clots of blood  Genitourinary:   Genitourinary Comments: Clear yellow urine per Haile catheter   Neurological: He is alert and oriented to person, place, and time  Skin: Skin is warm and dry  Psychiatric: He has a normal mood and affect

## 2019-11-04 NOTE — TELEPHONE ENCOUNTER
Called patient to schedule perc monet drainage tube check  No answer  Left a message for a potential appointment for 11/7 at 1200  Callback information provided

## 2019-11-05 ENCOUNTER — TRANSCRIBE ORDERS (OUTPATIENT)
Dept: LAB | Facility: CLINIC | Age: 79
End: 2019-11-05

## 2019-11-05 ENCOUNTER — LAB (OUTPATIENT)
Dept: LAB | Facility: CLINIC | Age: 79
End: 2019-11-05
Payer: COMMERCIAL

## 2019-11-05 DIAGNOSIS — E34.0 CARCINOID SYNDROME (HCC): ICD-10-CM

## 2019-11-05 DIAGNOSIS — C7B.8 NEUROENDOCRINE CARCINOMA METASTATIC TO LIVER (HCC): ICD-10-CM

## 2019-11-05 DIAGNOSIS — D64.9 ANEMIA, UNSPECIFIED TYPE: ICD-10-CM

## 2019-11-05 DIAGNOSIS — C7A.8 NEUROENDOCRINE CARCINOMA METASTATIC TO LIVER (HCC): ICD-10-CM

## 2019-11-05 LAB
ALBUMIN SERPL BCP-MCNC: 3.2 G/DL (ref 3.5–5)
ALP SERPL-CCNC: 137 U/L (ref 46–116)
ALT SERPL W P-5'-P-CCNC: 23 U/L (ref 12–78)
ANION GAP SERPL CALCULATED.3IONS-SCNC: 9 MMOL/L (ref 4–13)
AST SERPL W P-5'-P-CCNC: 12 U/L (ref 5–45)
BASOPHILS # BLD AUTO: 0.08 THOUSANDS/ΜL (ref 0–0.1)
BASOPHILS NFR BLD AUTO: 1 % (ref 0–1)
BILIRUB SERPL-MCNC: 0.48 MG/DL (ref 0.2–1)
BLD SMEAR INTERP: NORMAL
BUN SERPL-MCNC: 27 MG/DL (ref 5–25)
CALCIUM SERPL-MCNC: 9 MG/DL (ref 8.3–10.1)
CHLORIDE SERPL-SCNC: 109 MMOL/L (ref 100–108)
CO2 SERPL-SCNC: 23 MMOL/L (ref 21–32)
CREAT SERPL-MCNC: 2.17 MG/DL (ref 0.6–1.3)
CRP SERPL QL: 26.1 MG/L
EOSINOPHIL # BLD AUTO: 0.13 THOUSAND/ΜL (ref 0–0.61)
EOSINOPHIL NFR BLD AUTO: 2 % (ref 0–6)
ERYTHROCYTE [DISTWIDTH] IN BLOOD BY AUTOMATED COUNT: 14.2 % (ref 11.6–15.1)
ERYTHROCYTE [SEDIMENTATION RATE] IN BLOOD: 72 MM/HOUR (ref 0–10)
FERRITIN SERPL-MCNC: 710 NG/ML (ref 8–388)
FOLATE SERPL-MCNC: >20 NG/ML (ref 3.1–17.5)
GFR SERPL CREATININE-BSD FRML MDRD: 28 ML/MIN/1.73SQ M
GLUCOSE P FAST SERPL-MCNC: 74 MG/DL (ref 65–99)
HCT VFR BLD AUTO: 34.3 % (ref 36.5–49.3)
HEMOCCULT STL QL IA: NEGATIVE
HGB BLD-MCNC: 10.7 G/DL (ref 12–17)
IMM GRANULOCYTES # BLD AUTO: 0.06 THOUSAND/UL (ref 0–0.2)
IMM GRANULOCYTES NFR BLD AUTO: 1 % (ref 0–2)
IRON SATN MFR SERPL: 39 %
IRON SERPL-MCNC: 97 UG/DL (ref 65–175)
LYMPHOCYTES # BLD AUTO: 1.38 THOUSANDS/ΜL (ref 0.6–4.47)
LYMPHOCYTES NFR BLD AUTO: 18 % (ref 14–44)
MCH RBC QN AUTO: 29.1 PG (ref 26.8–34.3)
MCHC RBC AUTO-ENTMCNC: 31.2 G/DL (ref 31.4–37.4)
MCV RBC AUTO: 93 FL (ref 82–98)
MONOCYTES # BLD AUTO: 0.57 THOUSAND/ΜL (ref 0.17–1.22)
MONOCYTES NFR BLD AUTO: 7 % (ref 4–12)
NEUTROPHILS # BLD AUTO: 5.45 THOUSANDS/ΜL (ref 1.85–7.62)
NEUTS SEG NFR BLD AUTO: 71 % (ref 43–75)
NRBC BLD AUTO-RTO: 0 /100 WBCS
PLATELET # BLD AUTO: 211 THOUSANDS/UL (ref 149–390)
PMV BLD AUTO: 11.2 FL (ref 8.9–12.7)
POTASSIUM SERPL-SCNC: 4.1 MMOL/L (ref 3.5–5.3)
PROT SERPL-MCNC: 7.4 G/DL (ref 6.4–8.2)
RBC # BLD AUTO: 3.68 MILLION/UL (ref 3.88–5.62)
RETICS # AUTO: ABNORMAL 10*3/UL (ref 14356–105094)
RETICS # CALC: 1.98 % (ref 0.37–1.87)
SODIUM SERPL-SCNC: 141 MMOL/L (ref 136–145)
TIBC SERPL-MCNC: 250 UG/DL (ref 250–450)
TSH SERPL DL<=0.05 MIU/L-ACNC: 1.3 UIU/ML (ref 0.36–3.74)
VIT B12 SERPL-MCNC: 1410 PG/ML (ref 100–900)
WBC # BLD AUTO: 7.67 THOUSAND/UL (ref 4.31–10.16)

## 2019-11-05 PROCEDURE — 86140 C-REACTIVE PROTEIN: CPT

## 2019-11-05 PROCEDURE — 85652 RBC SED RATE AUTOMATED: CPT

## 2019-11-05 PROCEDURE — 83540 ASSAY OF IRON: CPT

## 2019-11-05 PROCEDURE — 85045 AUTOMATED RETICULOCYTE COUNT: CPT

## 2019-11-05 PROCEDURE — 86316 IMMUNOASSAY TUMOR OTHER: CPT

## 2019-11-05 PROCEDURE — 82746 ASSAY OF FOLIC ACID SERUM: CPT

## 2019-11-05 PROCEDURE — 83550 IRON BINDING TEST: CPT

## 2019-11-05 PROCEDURE — 84443 ASSAY THYROID STIM HORMONE: CPT

## 2019-11-05 PROCEDURE — 82728 ASSAY OF FERRITIN: CPT

## 2019-11-05 PROCEDURE — 36415 COLL VENOUS BLD VENIPUNCTURE: CPT

## 2019-11-05 PROCEDURE — G0328 FECAL BLOOD SCRN IMMUNOASSAY: HCPCS

## 2019-11-05 PROCEDURE — 83010 ASSAY OF HAPTOGLOBIN QUANT: CPT

## 2019-11-05 PROCEDURE — 80053 COMPREHEN METABOLIC PANEL: CPT

## 2019-11-05 PROCEDURE — 82607 VITAMIN B-12: CPT

## 2019-11-05 PROCEDURE — 85025 COMPLETE CBC W/AUTO DIFF WBC: CPT

## 2019-11-06 LAB — HAPTOGLOB SERPL-MCNC: 407 MG/DL (ref 34–200)

## 2019-11-07 ENCOUNTER — HOSPITAL ENCOUNTER (OUTPATIENT)
Dept: INTERVENTIONAL RADIOLOGY/VASCULAR | Facility: HOSPITAL | Age: 79
Discharge: HOME | End: 2019-11-07
Attending: SPECIALIST | Admitting: RADIOLOGY
Payer: COMMERCIAL

## 2019-11-07 DIAGNOSIS — K83.8 BILIARY SLUDGE DETERMINED BY ULTRASOUND: ICD-10-CM

## 2019-11-07 PROCEDURE — C1769 GUIDE WIRE: HCPCS

## 2019-11-07 PROCEDURE — 47536 EXCHANGE BILIARY DRG CATH: CPT | Performed by: RADIOLOGY

## 2019-11-07 PROCEDURE — C1729 CATH, DRAINAGE: HCPCS

## 2019-11-07 PROCEDURE — 47536 EXCHANGE BILIARY DRG CATH: CPT

## 2019-11-07 RX ORDER — LIDOCAINE HYDROCHLORIDE 10 MG/ML
INJECTION, SOLUTION EPIDURAL; INFILTRATION; INTRACAUDAL; PERINEURAL CODE/TRAUMA/SEDATION MEDICATION
Status: COMPLETED | OUTPATIENT
Start: 2019-11-07 | End: 2019-11-07

## 2019-11-07 RX ORDER — LIDOCAINE HYDROCHLORIDE 20 MG/ML
SOLUTION OROPHARYNGEAL CODE/TRAUMA/SEDATION MEDICATION
Status: COMPLETED | OUTPATIENT
Start: 2019-11-07 | End: 2019-11-07

## 2019-11-07 RX ADMIN — IOHEXOL 20 ML: 300 INJECTION, SOLUTION INTRAVENOUS at 12:50

## 2019-11-07 RX ADMIN — LIDOCAINE HYDROCHLORIDE 10 ML: 10 INJECTION, SOLUTION EPIDURAL; INFILTRATION; INTRACAUDAL; PERINEURAL at 13:09

## 2019-11-07 RX ADMIN — LIDOCAINE HYDROCHLORIDE 15 ML: 20 SOLUTION ORAL; TOPICAL at 12:45

## 2019-11-07 NOTE — PROCEDURES
Procedures     Patient with metastatic neuroendocrine carcinoma  Indwelling cholecystostomy tube  Previous demonstration of occluded cystic duct  Concurrently thought to not be a surgical candidate  The patient recently noticed some blood in his cholecystostomy tube  The existing tube was removed over wire  I probed the cystic duct in the attempt to navigate passed the stone  I was hoping to internalize the drain, and at least get rid of his back  This was unsuccessful  A new tube was placed and secured with suture  The plan is to contact Dr Lory Castillo to inquire about Y 90 treatment of the neuroendocrine carcinoma  While this does not have much effect on life expectancy, it would significantly decrease the tumor burden in the liver  This may help make a cholecystectomy more peeling to the surgery department

## 2019-11-07 NOTE — DISCHARGE INSTRUCTIONS
[address]  Interventional Radiology  Dr Mike Flanagan: [phone] (M-F 7:30am - 4:00pm)  [address] [facility]: [phone] (Off hours or no answer)           [address] [facility]  after your procedure:    Resume your normal diet  Small sips of flat soda will help with nausea  1  The properly functioning catheter should be forward flushed once (1x) daily with 10ml of normal saline using clean technique  You will be given a prescription for flushes  To flush the tube, clean both connections with alcohol swab  Twist off the drainage bag/ bulb  tubing and twist the saline syringe into the drainage tube and flush  Remove the syringe and twist the drainage bag / bulb tubing tubing back on     2  The drainage bag/bulb may be emptied as necessary  Keep a record of the amount of fluid you drain from your tube  This should be done with clean technique as well  3  A fresh dressing should be applied daily over the tube insertion site  4  As the tube is secured to the skin with only a suture,try not to pull on your tube  Tub baths are not permitted  Showers are permitted if the patient's skin entry site is prevented from getting wet  Similarly, washcloth "baths" are acceptable  Contact Interventional Radiology at [phone] [place] PATIENTS: Contact Interventional Radiology at [phone]) [place] PATIENTS: Contact Interventional Radiology at [phone]) if:    1  Leakage or large amounts of liquid around the catheter  2  Fever of 101 degrees lasting several hours without other obvious cause (such as sore throat, flu, etc)  3  Persistent nausea or vomiting  4  Diminished drainage, which may be associated with pressure or pain  Or when the     drainage from your tube is less than 10mls for 48 hours  5  Catheter pulled back or falls out  The following pharmacies carry the flush syringes         [address] [place] [address]                         [address] [place] [address]  Phone [phone]            Phone [phone] [address] [facility]                                [phone]  [facility]                      [address] [place]  Phone [phone]            Phone [phone]                      Louise Pellet                                                                                                          [phone]  [facility]  [facility] [address]    [address]  Phone [phone]

## 2019-11-12 ENCOUNTER — OFFICE VISIT (OUTPATIENT)
Dept: HEMATOLOGY ONCOLOGY | Facility: CLINIC | Age: 79
End: 2019-11-12
Payer: COMMERCIAL

## 2019-11-12 VITALS
WEIGHT: 156.2 LBS | HEART RATE: 105 BPM | TEMPERATURE: 98.5 F | HEIGHT: 68 IN | RESPIRATION RATE: 16 BRPM | BODY MASS INDEX: 23.67 KG/M2 | OXYGEN SATURATION: 96 %

## 2019-11-12 DIAGNOSIS — D64.9 ANEMIA, UNSPECIFIED TYPE: ICD-10-CM

## 2019-11-12 DIAGNOSIS — E34.0 CARCINOID SYNDROME (HCC): ICD-10-CM

## 2019-11-12 DIAGNOSIS — C7A.8 NEUROENDOCRINE CARCINOMA METASTATIC TO LIVER (HCC): Primary | ICD-10-CM

## 2019-11-12 DIAGNOSIS — C7B.8 NEUROENDOCRINE CARCINOMA METASTATIC TO LIVER (HCC): Primary | ICD-10-CM

## 2019-11-12 LAB — CGA SERPL-SCNC: 379 NMOL/L (ref 0–5)

## 2019-11-12 PROCEDURE — 99214 OFFICE O/P EST MOD 30 MIN: CPT | Performed by: NURSE PRACTITIONER

## 2019-11-12 NOTE — PROGRESS NOTES
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 [facility])  Patient will be continued on his Lanreotide injections 120 mg every 4 weeks for his neuroendocrine tumor  He seems to have decreased carcinoid symptoms/diarrhea after resuming his injections last month  His next injection is due on 11/19/2019  Patient will be back for follow-up in about 4 weeks with repeat laboratory studies prior  Patient will be sent to radiation oncology/interventional radiology team at the [address] [place] [address] to be evaluated to see if he is a candidate for liver directed treatment with Y 90 Sir spheres as recommended by Dr Lesli Orlando IR      - CBC and differential; Future  - Comprehensive metabolic panel; Future  - Magnesium; Future  - Chromogranin A; Future  - Ambulatory referral to Interventional Radiology; Future    2  Carcinoid syndrome ([facility] [address] )  As above  3  Anemia, unspecified type  Patient continues to have stable normocytic anemia hemoglobin 10 7  Anemia is multifactorial including anemia of chronic renal disease and anemia of chronic disease with chronic elevation of his inflammatory markers  He does not appear to have any evidence of nutritional deficiencies at this time  HPI:  Patient presents today for a follow-up appointment accompanied by his wife  He was restarted on his Lanreotide injections 10/22/2019 and reports significant improvement in his diarrhea after resuming  He states that he has occasional bout of diarrhea and his wife admits that he does occasionally have facial flushing  Patient denies any chest pain or heart palpitation  He does report dyspnea but states that it is exertional with activity  The patient states that he is currently receiving physical therapy in the home twice a week    He has chronic Haile catheter that is changed monthly by his urologist       He also has a right abdominal percutaneous cholecystectomy tube which is draining a small amount of reddish brown fluid  His tube is being monitored by the interventional radiology team at [facility]  They have been having some issues with his tube placement/gallstones and are recommending liver targeted therapy with Y 90 Sir spheres in hopes that this will decrease the tumor burden in the liver making surgical cholecystectomy more appealing to the surgical team     Patient's most recent laboratory studies 11/05/2019 appear to be stable  White cells and platelets are normal he has chronic normocytic anemia H&H 10 7/34 3, MCV is improved from prior  Stable chronic renal dysfunction BUN 27, creatinine 2 17 GFR 28, albumin low 3 2, chronic elevation of the alk-phos which is slightly improved 137 remaining metabolic panel is within normal limits  His vitamin K59 and folic acid are appropriate  TSH is normal   Continues to have elevation of his inflammatory markers sed rate 72, CRP 26 1  Stool for occult blood was negative  Iron panel reveals transferrin iron saturation 39%, TIBC 250, serum iron 97 an elevated ferritin 710 which is likely reactive  He is no longer taking iron supplements  His chromogranin a level was drawn but is still pending at this time  Oncology History    Patient has multiple comorbid conditions including history of coronary artery disease, diabetes mellitus, hypertension, hyperlipidemia, and more recently metastatic neuroendocrine tumor       The patient was seen in consultation when he was in the hospital on the 5th February 2019 at that time he had significant weakness status post vasovagal syncope   He was evaluated with a CT scan of the abdomen and pelvis on the 23rd January 2019 which showed partially calcified mesenteric mass with multiple liver lesions compatible most likely with carcinoid malignancy   He also was found to have bilateral hydronephrosis and severe bladder wall thickening with prostatomegaly  The patient then had a core biopsy from 1 of the liver lesions on the 24th of January which showed well-differentiated neuroendocrine tumor grade 1-2 with Ki 67 of 3-5%       His chromogranin level  February 2019 was 273   His 24 hour urine for HIAA was 48 which is above normal   The patient was started on Lanreotide in March 2019  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 ([facility] [address] )    1/24/2019 Initial Diagnosis     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 ([facility] [address] )         Interval history:    ROS: Review of Systems   Constitutional: Positive for fatigue (moderate amt)  Negative for activity change, appetite change, chills, fever and unexpected weight change  HENT: Negative for congestion, mouth sores, nosebleeds, sore throat and trouble swallowing  Eyes: Negative  Respiratory: Positive for shortness of breath  Negative for cough and chest tightness  Cardiovascular: Negative for chest pain, palpitations and leg swelling  Gastrointestinal: Positive for abdominal pain (Occasional pain at tube site-comes and goes) and diarrhea  Negative for abdominal distention, blood in stool, constipation, nausea and vomiting  Genitourinary: Negative for difficulty urinating, dysuria, frequency, hematuria and urgency  Haile catheter   Musculoskeletal: Positive for gait problem ( ambulates with a walker)  Negative for arthralgias, back pain, joint swelling and myalgias  Skin: Negative for color change, pallor and rash  Neurological: Positive for weakness and numbness ( and tingling mild chronic)  Negative for dizziness, light-headedness and headaches  Hematological: Negative for adenopathy  Does not bruise/bleed easily  Psychiatric/Behavioral: Negative for dysphoric mood and sleep disturbance  The patient is not nervous/anxious          Past Medical History:   Diagnosis Date    Acute pancreatitis without infection or necrosis 9/26/2019    Anemia of chronic renal failure     BPH (benign prostatic hyperplasia)     Cancer (HCC)     liver cancer    Cardiac disease     Chronic kidney disease, stage 3 (HCC)     Coronary artery disease     Diabetes mellitus (HCC)     DJD (degenerative joint disease)     Essential hypertension     Gait disturbance     Generalized weakness     GERD (gastroesophageal reflux disease)     Gout     History of transfusion     Hydronephrosis     Hyperlipidemia     Hypertension     Pressure injury of skin     Proteinuria     Renal disorder     Retention of urine     Thrombophlebitis migrans     Type 2 diabetes mellitus (HCC)     Type 2 diabetes 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, with long-term current use of insulin [facility] 1/23/2019       Past Surgical History:   Procedure Laterality Date    CORONARY ANGIOPLASTY WITH STENT PLACEMENT      IR IMAGE GUIDED BIOPSY/ASPIRATION LIVER  1/24/2019    IR TUBE PLACEMENT ROQUE  9/6/2019       Social History     Socioeconomic History    Marital status:       Spouse name: Not on file    Number of children: Not on file    Years of education: Not on file    Highest education level: Not on file   Occupational History    Not on file   Social Needs    Financial resource strain: Not on file    Food insecurity:     Worry: Not on file     Inability: Not on file    Transportation needs:     Medical: Not on file     Non-medical: Not on file   Tobacco Use    Smoking status: Never Smoker    Smokeless tobacco: Never Used   Substance and Sexual Activity    Alcohol use: Never     Frequency: Never    Drug use: Never    Sexual activity: Never   Lifestyle    Physical activity:     Days per week: Not on file     Minutes per session: Not on file    Stress: Not on file   Relationships    Social connections:     Talks on phone: Not on file     Gets together: Not on file     Attends Religion service: Not on file     Active member of club or organization: Not on file     Attends meetings of clubs or organizations: Not on file     Relationship status: Not on file    Intimate partner violence:     Fear of current or ex partner: Not on file     Emotionally abused: Not on file     Physically abused: Not on file     Forced sexual activity: Not on file   Other Topics Concern    Not on file   Social History Narrative    Not on file       Family History   Problem Relation Age of Onset    Cancer Mother     Hypertension Father     Cancer Brother     Cancer Maternal Grandmother     Cancer Paternal Aunt        No Known Allergies      Current Outpatient Medications:     acetaminophen (TYLENOL) 325 mg tablet, Take by mouth as needed, Disp: , Rfl:     amLODIPine (NORVASC) 10 mg tablet, Take 10 mg by mouth daily, Disp: , Rfl:     aspirin 81 MG tablet, Take 81 mg by mouth daily, Disp: , Rfl:     b complex-vitamin C-folic acid (NEPHROCAPS) 1 mg capsule, Take 1 capsule by mouth daily with dinner, Disp: 60 capsule, Rfl: 0    cholecalciferol (VITAMIN D3) 1,000 units tablet, Take 1,000 Units by mouth daily, Disp: , Rfl:     colchicine (COLCRYS) 0 6 mg tablet, Take 0 6 mg by mouth daily, Disp: , Rfl:     insulin detemir (LEVEMIR) 100 units/mL subcutaneous injection, Inject 10 Units under the skin daily at bedtime, Disp: , Rfl:     insulin lispro (HumaLOG) 100 units/mL injection, Inject under the skin 3 (three) times a day before meals, Disp: , Rfl:     Insulin Syringe-Needle U-100 30G X 1/2" 1 ML MISC, by Does not apply route 3 (three) times a day, Disp: 100 each, Rfl: 5    methenamine hippurate (HIPREX) 1 g tablet, Take 1 g by mouth 2 (two) times a day with meals, Disp: , Rfl:     oxyCODONE-acetaminophen (PERCOCET) 5-325 mg per tablet, Take 1 tablet by mouth every 4 (four) hours as needed for moderate pain, Disp: , Rfl:     pantoprazole (PROTONIX) 40 mg tablet, Take 1 tablet (40 mg total) by mouth daily in the early morning, Disp: , Rfl: 0    simethicone (MYLICON) 80 mg chewable tablet, Chew 80 mg every 6 (six) hours as needed for flatulence, Disp: , Rfl:     sodium bicarbonate 650 mg tablet, Take 1 tablet (650 mg total) by mouth 2 (two) times daily after meals, Disp: , Rfl: 0    sorbitol 70 % solution, Take 15 mL by mouth daily as needed, Disp: , Rfl:     tamsulosin (FLOMAX) 0 4 mg, Take 1 capsule (0 4 mg total) by mouth daily with dinner, Disp: , Rfl: 0      Physical Exam:  Pulse 105   Temp 98 5 °F (36 9 °C) (Tympanic)   Resp 16   Ht 5' 8" (1 727 m)   Wt 70 9 kg (156 lb 3 2 oz)   SpO2 96%   BMI 23 75 kg/m²     Physical Exam   Constitutional: He is oriented to person, place, and time  He appears well-developed and well-nourished  No distress  HENT:   Head: Normocephalic and atraumatic  Mouth/Throat: Oropharynx is clear and moist  No oropharyngeal exudate  Eyes: Pupils are equal, round, and reactive to light  Conjunctivae are normal  No scleral icterus  Neck: Normal range of motion  Neck supple  No thyromegaly present  Cardiovascular: Normal rate, regular rhythm, normal heart sounds and intact distal pulses  No murmur heard  Pulmonary/Chest: Effort normal and breath sounds normal  No respiratory distress  Abdominal: Soft  Bowel sounds are normal  He exhibits no distension  There is no hepatosplenomegaly  There is no tenderness  Right abdomen/side percutaneous cholecystectomy drain without erythema, warmth or drainage- small amount of brownish red fluid noted in the bag  Genitourinary:   Genitourinary Comments: Haile catheter draining cloudy yellow urine   Musculoskeletal: Normal range of motion  He exhibits no edema  Lymphadenopathy:     He has no cervical adenopathy  He has no axillary adenopathy  Neurological: He is alert and oriented to person, place, and time  Skin: Skin is warm and dry  No rash noted  He is not diaphoretic  No erythema  No pallor  Psychiatric: His behavior is normal  Judgment and thought content normal  His affect is blunt     Vitals reviewed  Labs:  Lab Results   Component Value Date    WBC 7 67 11/05/2019    HGB 10 7 (L) 11/05/2019    HCT 34 3 (L) 11/05/2019    MCV 93 11/05/2019     11/05/2019     Lab Results   Component Value Date    K 4 1 11/05/2019     (H) 11/05/2019    CO2 23 11/05/2019    BUN 27 (H) 11/05/2019    CREATININE 2 17 (H) 11/05/2019    GLUF 74 11/05/2019    CALCIUM 9 0 11/05/2019    AST 12 11/05/2019    ALT 23 11/05/2019    ALKPHOS 137 (H) 11/05/2019    EGFR 28 11/05/2019         Patient voiced understanding and agreement in the above discussion  Aware to contact our office with questions/symptoms in the interim  This note has been generated by voice recognition software system  Therefore, there may be spelling, grammar, and or syntax errors  Please contact if questions arise

## 2019-11-13 ENCOUNTER — OFFICE VISIT (OUTPATIENT)
Dept: NEPHROLOGY | Facility: CLINIC | Age: 79
End: 2019-11-13
Payer: COMMERCIAL

## 2019-11-13 VITALS — SYSTOLIC BLOOD PRESSURE: 138 MMHG | DIASTOLIC BLOOD PRESSURE: 64 MMHG | HEART RATE: 64 BPM

## 2019-11-13 DIAGNOSIS — E87.2 METABOLIC ACIDOSIS: ICD-10-CM

## 2019-11-13 DIAGNOSIS — E44.0 MODERATE PROTEIN-CALORIE MALNUTRITION (HCC): Chronic | ICD-10-CM

## 2019-11-13 DIAGNOSIS — D3A.8 NEUROENDOCRINE TUMOR: Chronic | ICD-10-CM

## 2019-11-13 DIAGNOSIS — Z97.8 CHRONIC INDWELLING FOLEY CATHETER: Primary | Chronic | ICD-10-CM

## 2019-11-13 DIAGNOSIS — N18.30 CKD (CHRONIC KIDNEY DISEASE) STAGE 3, GFR 30-59 ML/MIN (HCC): ICD-10-CM

## 2019-11-13 PROBLEM — Z16.12 URINARY TRACT INFECTION DUE TO EXTENDED-SPECTRUM BETA LACTAMASE (ESBL) PRODUCING ESCHERICHIA COLI: Status: RESOLVED | Noted: 2019-07-01 | Resolved: 2019-11-13

## 2019-11-13 PROBLEM — R19.07 GENERALIZED ABDOMINAL MASS: Chronic | Status: RESOLVED | Noted: 2019-01-23 | Resolved: 2019-11-13

## 2019-11-13 PROBLEM — E83.42 HYPOMAGNESEMIA: Status: RESOLVED | Noted: 2019-09-28 | Resolved: 2019-11-13

## 2019-11-13 PROBLEM — N17.9 ACUTE KIDNEY INJURY (HCC): Status: RESOLVED | Noted: 2019-04-01 | Resolved: 2019-11-13

## 2019-11-13 PROBLEM — N30.00 ACUTE CYSTITIS WITHOUT HEMATURIA: Status: RESOLVED | Noted: 2019-02-03 | Resolved: 2019-11-13

## 2019-11-13 PROBLEM — R10.84 GENERALIZED ABDOMINAL PAIN: Status: RESOLVED | Noted: 2019-09-04 | Resolved: 2019-11-13

## 2019-11-13 PROBLEM — N39.0 URINARY TRACT INFECTION DUE TO EXTENDED-SPECTRUM BETA LACTAMASE (ESBL) PRODUCING ESCHERICHIA COLI: Status: RESOLVED | Noted: 2019-07-01 | Resolved: 2019-11-13

## 2019-11-13 PROBLEM — N30.01 ACUTE CYSTITIS WITH HEMATURIA: Chronic | Status: RESOLVED | Noted: 2019-06-03 | Resolved: 2019-11-13

## 2019-11-13 PROBLEM — B96.29 URINARY TRACT INFECTION DUE TO EXTENDED-SPECTRUM BETA LACTAMASE (ESBL) PRODUCING ESCHERICHIA COLI: Status: RESOLVED | Noted: 2019-07-01 | Resolved: 2019-11-13

## 2019-11-13 PROCEDURE — 99214 OFFICE O/P EST MOD 30 MIN: CPT | Performed by: INTERNAL MEDICINE

## 2019-11-13 NOTE — ASSESSMENT & PLAN NOTE
Stable, continue with Haile catheter according Urology  We will continue with methenamine hippurate  Stay continues to suppress recurring urinary tract infection  Patient tends to develop ESBL positive E coli urinary tract infections

## 2019-11-13 NOTE — ASSESSMENT & PLAN NOTE
Renal function is stable continue closely monitor  Possible improvement in creatinine due to decreased muscle mass, unclear at this time  No 24 hour urine will be done today having measured creatinine clearance as it is not point to change current course of care

## 2019-11-13 NOTE — PROGRESS NOTES
[facility] [place], [place]    Name: Edgardo Allison  YOB: 1940      Assessment/Plan:    CKD (chronic kidney disease) stage 3, GFR 30-59 ml/min ([facility] [address] )  Renal function is stable continue closely monitor  Possible improvement in creatinine due to decreased muscle mass, unclear at this time  No 24 hour urine will be done today having measured creatinine clearance as it is not point to change current course of care  Metabolic acidosis    Continue sodium bicarbonate supplementation, patient is stable  Moderate protein-calorie malnutrition ([facility] [address] )  Malnutrition Findings:           BMI Findings: There is no height or weight on file to calculate BMI  Patient's albumin has improved up to 3 2 g/dL, continue to encourage protein intake  Patient cholecystitis is currently controlled with a percutaneous cholecystostomy  Chronic indwelling Haile catheter    Stable, continue with Haile catheter according Urology  We will continue with methenamine hippurate  Stay continues to suppress recurring urinary tract infection  Patient tends to develop ESBL positive E coli urinary tract infections  Problem List Items Addressed This Visit        Genitourinary    CKD (chronic kidney disease) stage 3, GFR 30-59 ml/min ([facility])     Renal function is stable continue closely monitor  Possible improvement in creatinine due to decreased muscle mass, unclear at this time  No 24 hour urine will be done today having measured creatinine clearance as it is not point to change current course of care  Relevant Orders    Comprehensive metabolic panel    CBC    Phosphorus    Magnesium       Other    Neuroendocrine tumor (Chronic)    Relevant Orders    Comprehensive metabolic panel    CBC    Phosphorus    Magnesium    Chronic indwelling Haile catheter - Primary (Chronic)       Stable, continue with Haile catheter according Urology    We will continue with methenamine hippurate  Stay continues to suppress recurring urinary tract infection  Patient tends to develop ESBL positive E coli urinary tract infections  Moderate protein-calorie malnutrition (HCC) (Chronic)     Malnutrition Findings:           BMI Findings: There is no height or weight on file to calculate BMI  Patient's albumin has improved up to 3 2 g/dL, continue to encourage protein intake  Patient cholecystitis is currently controlled with a percutaneous cholecystostomy  Metabolic acidosis       Continue sodium bicarbonate supplementation, patient is stable  Subjective:      Patient ID: Anton Das is a [de-identified] y o  male  Patient will need to have further interventions to help reduce tumor burden in the vicinity of the liver  He has no abdominal pain at this time  Hypertension   This is a chronic problem  The current episode started more than 1 year ago  The problem is unchanged  The problem is controlled  Pertinent negatives include no chest pain or orthopnea  There are no associated agents to hypertension  Risk factors for coronary artery disease include male gender and sedentary lifestyle  Past treatments include calcium channel blockers  There are no compliance problems  Hypertensive end-organ damage includes kidney disease  Identifiable causes of hypertension include chronic renal disease  The following portions of the patient's history were reviewed and updated as appropriate: allergies, current medications, past family history, past medical history, past social history, past surgical history and problem list     Review of Systems   Cardiovascular: Negative for chest pain and orthopnea  All other systems reviewed and are negative  Social History     Socioeconomic History    Marital status:       Spouse name: Not on file    Number of children: Not on file    Years of education: Not on file    Highest education level: Not on file   Occupational History    Not on file   Social Needs    Financial resource strain: Not on file    Food insecurity:     Worry: Not on file     Inability: Not on file    Transportation needs:     Medical: Not on file     Non-medical: Not on file   Tobacco Use    Smoking status: Never Smoker    Smokeless tobacco: Never Used   Substance and Sexual Activity    Alcohol use: Never     Frequency: Never    Drug use: Never    Sexual activity: Never   Lifestyle    Physical activity:     Days per week: Not on file     Minutes per session: Not on file    Stress: Not on file   Relationships    Social connections:     Talks on phone: Not on file     Gets together: Not on file     Attends Mandaen service: Not on file     Active member of club or organization: Not on file     Attends meetings of clubs or organizations: Not on file     Relationship status: Not on file    Intimate partner violence:     Fear of current or ex partner: Not on file     Emotionally abused: Not on file     Physically abused: Not on file     Forced sexual activity: Not on file   Other Topics Concern    Not on file   Social History Narrative    Not on file     Past Medical History:   Diagnosis Date    Acute pancreatitis without infection or necrosis 9/26/2019    Anemia of chronic renal failure     BPH (benign prostatic hyperplasia)     Cancer ([place] [address] )     liver cancer    Cardiac disease     Chronic kidney disease, stage 3 ([place] [address] )     Coronary artery disease     Diabetes mellitus ([place] [address] )     DJD (degenerative joint disease)     Essential hypertension     Gait disturbance     Generalized weakness     GERD (gastroesophageal reflux disease)     Gout     History of transfusion     Hydronephrosis     Hyperlipidemia     Hypertension     Pressure injury of skin     Proteinuria     Renal disorder     Retention of urine     Thrombophlebitis migrans     Type 2 diabetes mellitus ([place] [address] )     Type 2 diabetes 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, with long-term current use of insulin ([facility] [address] ) 1/23/2019     Past Surgical History:   Procedure Laterality Date    CORONARY ANGIOPLASTY WITH STENT PLACEMENT      IR IMAGE GUIDED BIOPSY/ASPIRATION LIVER  1/24/2019    IR TUBE PLACEMENT ROQUE  9/6/2019       Current Outpatient Medications:     acetaminophen (TYLENOL) 325 mg tablet, Take by mouth as needed, Disp: , Rfl:     amLODIPine (NORVASC) 10 mg tablet, Take 10 mg by mouth daily, Disp: , Rfl:     aspirin 81 MG tablet, Take 81 mg by mouth daily, Disp: , Rfl:     b complex-vitamin C-folic acid (NEPHROCAPS) 1 mg capsule, Take 1 capsule by mouth daily with dinner, Disp: 60 capsule, Rfl: 0    cholecalciferol (VITAMIN D3) 1,000 units tablet, Take 1,000 Units by mouth daily, Disp: , Rfl:     colchicine (COLCRYS) 0 6 mg tablet, Take 0 6 mg by mouth daily, Disp: , Rfl:     insulin detemir (LEVEMIR) 100 units/mL subcutaneous injection, Inject 10 Units under the skin daily at bedtime, Disp: , Rfl:     insulin lispro (HumaLOG) 100 units/mL injection, Inject under the skin 3 (three) times a day before meals, Disp: , Rfl:     Insulin Syringe-Needle U-100 30G X 1/2" 1 ML MISC, by Does not apply route 3 (three) times a day, Disp: 100 each, Rfl: 5    methenamine hippurate (HIPREX) 1 g tablet, Take 1 g by mouth 3 (three) times a day, Disp: , Rfl:     oxyCODONE-acetaminophen (PERCOCET) 5-325 mg per tablet, Take 1 tablet by mouth every 4 (four) hours as needed for moderate pain, Disp: , Rfl:     pantoprazole (PROTONIX) 40 mg tablet, Take 1 tablet (40 mg total) by mouth daily in the early morning, Disp: , Rfl: 0    simethicone (MYLICON) 80 mg chewable tablet, Chew 80 mg every 6 (six) hours as needed for flatulence, Disp: , Rfl:     sodium bicarbonate 650 mg tablet, Take 1 tablet (650 mg total) by mouth 2 (two) times daily after meals, Disp: , Rfl: 0    sorbitol 70 % solution, Take 15 mL by mouth daily as needed, Disp: , Rfl:     tamsulosin (FLOMAX) 0 4 mg, Take 1 capsule (0 4 mg total) by mouth daily with dinner, Disp: , Rfl: 0     Lab Results   Component Value Date    SODIUM 141 11/05/2019    K 4 1 11/05/2019     (H) 11/05/2019    CO2 23 11/05/2019    AGAP 9 11/05/2019    BUN 27 (H) 11/05/2019    CREATININE 2 17 (H) 11/05/2019    GLUC 197 (H) 10/21/2019    GLUF 74 11/05/2019    CALCIUM 9 0 11/05/2019    AST 12 11/05/2019    ALT 23 11/05/2019    ALKPHOS 137 (H) 11/05/2019    TP 7 4 11/05/2019    TBILI 0 48 11/05/2019    EGFR 28 11/05/2019     Lab Results   Component Value Date    WBC 7 67 11/05/2019    HGB 10 7 (L) 11/05/2019    HCT 34 3 (L) 11/05/2019    MCV 93 11/05/2019     11/05/2019     Lab Results   Component Value Date    CHOLESTEROL 164 07/16/2018    CHOLESTEROL 188 06/24/2017     No results found for: HDL  No results found for: LDLCALC  No results found for: TRIG  No results found for: CHOLHDL  Lab Results   Component Value Date    [id number] 1 300 11/05/2019           Objective:      /64 (BP Location: Right arm, Patient Position: Sitting, Cuff Size: Standard)   Pulse 64          Physical Exam   Constitutional: He is oriented to person, place, and time  He appears well-developed and well-nourished  No distress  HENT:   Head: Normocephalic and atraumatic  Eyes: Conjunctivae are normal    Neck: Neck supple  Cardiovascular: Normal rate and regular rhythm  Pulmonary/Chest: Effort normal and breath sounds normal    Abdominal: Soft  Musculoskeletal: He exhibits no edema  Neurological: He is alert and oriented to person, place, and time  No cranial nerve deficit  Skin: Skin is warm  No rash noted  Psychiatric: He has a normal mood and affect   His behavior is normal

## 2019-11-13 NOTE — ASSESSMENT & PLAN NOTE
Malnutrition Findings:           BMI Findings: There is no height or weight on file to calculate BMI  Patient's albumin has improved up to 3 2 g/dL, continue to encourage protein intake  Patient cholecystitis is currently controlled with a percutaneous cholecystostomy

## 2019-11-18 DIAGNOSIS — R14.1 GAS PAIN: Primary | ICD-10-CM

## 2019-11-18 RX ORDER — SIMETHICONE 80 MG
80 TABLET,CHEWABLE ORAL EVERY 6 HOURS PRN
Qty: 90 TABLET | Refills: 5 | Status: SHIPPED | OUTPATIENT
Start: 2019-11-18 | End: 2020-01-01

## 2019-11-19 ENCOUNTER — HOSPITAL ENCOUNTER (OUTPATIENT)
Dept: INFUSION CENTER | Facility: HOSPITAL | Age: 79
Discharge: HOME | End: 2019-11-19
Payer: COMMERCIAL

## 2019-11-19 VITALS
OXYGEN SATURATION: 100 % | DIASTOLIC BLOOD PRESSURE: 74 MMHG | TEMPERATURE: 97.6 F | HEART RATE: 69 BPM | RESPIRATION RATE: 20 BRPM | SYSTOLIC BLOOD PRESSURE: 159 MMHG

## 2019-11-19 DIAGNOSIS — C7A.8 MALIGNANT NEUROENDOCRINE NEOPLASM (HCC): ICD-10-CM

## 2019-11-19 DIAGNOSIS — D3A.8 NEUROENDOCRINE TUMOR: ICD-10-CM

## 2019-11-19 DIAGNOSIS — E34.0 CARCINOID SYNDROME (HCC): Primary | ICD-10-CM

## 2019-11-19 DIAGNOSIS — C7B.8 NEUROENDOCRINE CARCINOMA METASTATIC TO LIVER (HCC): ICD-10-CM

## 2019-11-19 DIAGNOSIS — C7A.8 NEUROENDOCRINE CARCINOMA METASTATIC TO LIVER (HCC): ICD-10-CM

## 2019-11-19 PROCEDURE — 96372 THER/PROPH/DIAG INJ SC/IM: CPT

## 2019-11-19 RX ORDER — LANREOTIDE ACETATE 120 MG/.5ML
120 INJECTION SUBCUTANEOUS ONCE
Status: CANCELLED | OUTPATIENT
Start: 2019-12-17

## 2019-11-19 RX ORDER — LANREOTIDE ACETATE 120 MG/.5ML
120 INJECTION SUBCUTANEOUS ONCE
Status: COMPLETED | OUTPATIENT
Start: 2019-11-19 | End: 2019-11-19

## 2019-11-19 RX ADMIN — LANREOTIDE ACETATE 120 MG: 120 INJECTION SUBCUTANEOUS at 12:30

## 2019-11-19 NOTE — PROGRESS NOTES
Patient tolerated injection well  Offered no complaints  AVS provided  Left unit in stable condition with spouse,

## 2019-11-22 ENCOUNTER — RADIATION ONCOLOGY CONSULT (OUTPATIENT)
Dept: RADIATION ONCOLOGY | Facility: HOSPITAL | Age: 79
End: 2019-11-22
Attending: STUDENT IN AN ORGANIZED HEALTH CARE EDUCATION/TRAINING PROGRAM

## 2019-11-22 VITALS
DIASTOLIC BLOOD PRESSURE: 60 MMHG | OXYGEN SATURATION: 99 % | WEIGHT: 158.2 LBS | RESPIRATION RATE: 18 BRPM | BODY MASS INDEX: 23.98 KG/M2 | TEMPERATURE: 97.3 F | HEIGHT: 68 IN | HEART RATE: 90 BPM | SYSTOLIC BLOOD PRESSURE: 146 MMHG

## 2019-11-22 DIAGNOSIS — C7A.8 NEUROENDOCRINE CARCINOMA METASTATIC TO LIVER (HCC): Primary | ICD-10-CM

## 2019-11-22 DIAGNOSIS — C7B.8 NEUROENDOCRINE CARCINOMA METASTATIC TO LIVER (HCC): Primary | ICD-10-CM

## 2019-11-22 NOTE — PROGRESS NOTES
Consultation - Radiation Oncology     [facility]:[id number] : 1940  Encounter: [id number]  Patient Information: Edgardo Allison      CHIEF COMPLAINT  Chief Complaint   Patient presents with    Consult     Radiation Oncology     Cancer Staging  No matching staging information was found for the patient  History of Present Illness   Edgardo Allison is a [de-identified]y o  year old male who presents today for 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  He is referred by Dr Bishop Calvin       [de-identified]year old male with a history of coronary artery disease, diabetes mellitus, hypertension, hyperlipidemia, and more recently metastatic neuroendocrine tumor  He was admitted to the hospital in 2019 with significant weakness s/p vasovagal syncope  CT of abd/pelvis revealed partially calcified mesenteric mass with multiple liver lesions  He also had bilateral hydronephrosis and severe bladder wall thickening with prostatomegaly  A liver biopsy was performed on 19 showing well-differentiated neuroendocrine tumor grade 2  Dr Bishop Calvin started pt on Lanreotide monthly injections in 19 CT Abd/Pelvis   IMPRESSION:  Previously seen partially calcified mass over the mesentery measuring 4 3 x 2 5 x 2 2 cm is again noted and is unchanged in appearance   Previously seen left iliac partially calcified metastatic lymph node measuring 1 cm in short axis is again noted   unchanged      No CT evidence for pancreatitis      Percutaneous cholecystostomy tube is again noted         19 Bloodwork  T-Bili: 0 48  AST: 12  ALT: 23  Alk Phos: 137  Chromogranin A: 379        19 Med Onc follow-up, RIKA Barth/  [address] [place] [address] to continue Lanreotide injections monthly  Pt has a right abdominal percutaneous cholecystectomy tube draining a small amt of reddish brown fluid  Tube being monitored by IR at [facility]     They have been having some issues with his tube placement/gallstones and are recommending liver targeted therapy with Y 90 Sir spheres in hopes that this will decrease the tumor burden in the liver making surgical cholecystectomy more appealing to the surgical team   Referral to radiation oncology/interventional radiology team at the [facility] to be evaluated to see if he is a candidate for liver directed treatment with Y 90 Sir spheres as recommended by Dr Sophia Aleman IR           12/10/19 Dr Nerissa Chand follow-up    Historical Information      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 [facility])    1/24/2019 Initial Diagnosis     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 [address] )      1/24/2019 Biopsy     A   Liver mass, core biopsy:  - Well differentiated neuroendocrine tumor, G2         3/2019 -  Chemotherapy     Lanreotide 120 mg every 4 weeks injections            Past Medical History:   Diagnosis Date    Acute pancreatitis without infection or necrosis 9/26/2019    Anemia of chronic renal failure     BPH (benign prostatic hyperplasia)     Cancer (HCC)     liver cancer    Cardiac disease     Chronic kidney disease, stage 3 (HCC)     Coronary artery disease     Diabetes mellitus ([address] )     DJD (degenerative joint disease)     Essential hypertension     Gait disturbance     Generalized weakness     GERD (gastroesophageal reflux disease)     Gout     History of transfusion     Hydronephrosis     Hyperlipidemia     Hypertension     Liver cancer ([address] )     Pressure injury of skin     Proteinuria     Renal disorder     Retention of urine     Thrombophlebitis migrans     Type 2 diabetes mellitus ([address] )     Type 2 diabetes 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, with long-term current use of insulin ([address] ) 1/23/2019     Past Surgical History:   Procedure Laterality Date    CORONARY ANGIOPLASTY WITH STENT PLACEMENT      IR IMAGE GUIDED BIOPSY/ASPIRATION LIVER  1/24/2019    IR TUBE PLACEMENT ROQUE  9/6/2019       Family History   Problem Relation Age of Onset    Cancer Mother     Hypertension Father     Cancer Brother     Cancer Maternal Grandmother     Cancer Paternal Aunt        Social History   Social History     Substance and Sexual Activity   Alcohol Use Never    Frequency: Never     Social History     Substance and Sexual Activity   Drug Use Never     Social History     Tobacco Use   Smoking Status Never Smoker   Smokeless Tobacco Never Used         Meds/Allergies     Current Outpatient Medications:     acetaminophen (TYLENOL) 325 mg tablet, Take by mouth as needed, Disp: , Rfl:     amLODIPine (NORVASC) 10 mg tablet, Take 10 mg by mouth daily, Disp: , Rfl:     aspirin 81 MG tablet, Take 81 mg by mouth daily, Disp: , Rfl:     b complex-vitamin C-folic acid (NEPHROCAPS) 1 mg capsule, Take 1 capsule by mouth daily with dinner, Disp: 60 capsule, Rfl: 0    cholecalciferol (VITAMIN D3) 1,000 units tablet, Take 1,000 Units by mouth daily, Disp: , Rfl:     colchicine (COLCRYS) 0 6 mg tablet, Take 0 6 mg by mouth daily, Disp: , Rfl:     insulin detemir (LEVEMIR) 100 units/mL subcutaneous injection, Inject 10 Units under the skin daily at bedtime, Disp: , Rfl:     insulin lispro (HumaLOG) 100 units/mL injection, Inject under the skin 3 (three) times a day before meals, Disp: , Rfl:     Insulin Syringe-Needle U-100 30G X 1/2" 1 ML MISC, by Does not apply route 3 (three) times a day, Disp: 100 each, Rfl: 5    methenamine hippurate (HIPREX) 1 g tablet, Take 1 g by mouth 3 (three) times a day, Disp: , Rfl:     oxyCODONE-acetaminophen (PERCOCET) 5-325 mg per tablet, Take 1 tablet by mouth every 4 (four) hours as needed for moderate pain, Disp: , Rfl:     pantoprazole (PROTONIX) 40 mg tablet, Take 1 tablet (40 mg total) by mouth daily in the early morning, Disp: , Rfl: 0    simethicone (MYLICON) 80 mg chewable tablet, Chew 1 tablet (80 mg total) every 6 (six) hours as needed for flatulence, Disp: 90 tablet, Rfl: 5    sodium bicarbonate 650 mg tablet, Take 1 tablet (650 mg total) by mouth 2 (two) times daily after meals, Disp: , Rfl: 0    sorbitol 70 % solution, Take 15 mL by mouth daily as needed, Disp: , Rfl:     tamsulosin (FLOMAX) 0 4 mg, Take 1 capsule (0 4 mg total) by mouth daily with dinner, Disp: , Rfl: 0  No Known Allergies      Review of Systems Constitutional: Positive for fatigue  Some weight loss when diagnosed but has been gaining weight back, appetite good   HENT: Negative  Eyes: Negative  Respiratory: Positive for shortness of breath (on exertion)  Cardiovascular: Negative  Gastrointestinal: Negative  Endocrine: Negative  Genitourinary:        Chronic urinary catheter, placed in January 2019, urologist changes catheter monthly   Musculoskeletal: Positive for gait problem (ambulates with rolling walker)  Skin:        Perc cholecystectomy drain intact on right side   Allergic/Immunologic: Negative  Neurological: Positive for weakness  Hematological: Negative  Psychiatric/Behavioral: Negative  OBJECTIVE:   /60 (BP Location: Right arm)   Pulse 90   Temp (!) 97 3 °F (36 3 °C) (Temporal)   Resp 18   Ht 5' 8" (1 727 m)   Wt 71 8 kg (158 lb 3 2 oz)   SpO2 99%   BMI 24 05 kg/m²   Pain Assessment:  0  Performance Status: ECOG/Zubrod/WHO: 1 - Symptomatic but completely ambulatory    Physical Exam GENERAL:  Appears stated age, in no apparent distress  Alert and oriented  HEENT:  Normocephalic, atraumatic   extraocular muscles intact  Oral mucosa moist   PULMONARY:  Respirations unlabored  CARDIOVASCULAR:  Regular rate  ABDOMEN:  Soft, nondistended, nontender to palpation  No hepatosplenomegaly  Percutaneous catheter in place  NEUROLOGIC: Moving all extremities, No focal deficits noted  EXTREMITIES: no clubbing, cyanosis, or edema  PSYCHIATRIC: normal mood and affect  Appropriate thought content and judgement            RESULTS  Lab Results    Chemistry        Component Value Date/Time    K 4 1 11/05/2019 0937     (H) 11/05/2019 0937    CO2 23 11/05/2019 0937    BUN 27 (H) 11/05/2019 0937    CREATININE 2 17 (H) 11/05/2019 0937        Component Value Date/Time    CALCIUM 9 0 11/05/2019 0937    ALKPHOS 137 (H) 11/05/2019 0937    AST 12 11/05/2019 0937    ALT 23 11/05/2019 0937            Lab Results   Component Value Date    WBC 7 67 11/05/2019    HGB 10 7 (L) 11/05/2019    HCT 34 3 (L) 11/05/2019    MCV 93 11/05/2019     11/05/2019         Imaging Studies  Ir Tube Change    Result Date: 11/8/2019  Narrative: EXAMINATION: Tube change, with fluoroscopic guidance  HISTORY: Indwelling percutaneous cholecystostomy tube in the right upper quadrant  Patient with occluded cystic duct  He had acute cholecystitis  Cholecystostomy tube was placed  Surgery is reluctant to perform completion cholecystectomy given the metastatic neuroendocrine carcinoma disease burden in the right lobe  We are looking today to see if there is some white to cap the tube, or advance the tube in place him on internal drainage  TECHNIQUE: Informed consent was obtained  The existing tube was prepped and draped in the usual sterile fashion  Timeout was performed  Contrast was injected  The system was opacified  The tube was transected, and navigated with a wire  The tube was removed over the wire  Lidocaine was given as local anesthesia  Using fluoroscopic guidance, I attempted to navigate out of the gallbladder, via the cystic duct  The goal was to place the patient on internal drainage, and get rid of the bag  I was unable to exit the gallbladder with a Glidewire  Using fluoroscopic guidance, a new 10 Upper sorbian tube was advanced over the wire, and coiled in place  The tube was secured with Percu-Stay and suture  The tube was connected to [place] bag  The patient tolerated the procedure well  There were no immediate complications or complaints  FINDINGS:  There is still a gallstone in the gallbladder    Again, injecting the tube does not show any exit of contrast out of the gallbladder  The cystic duct is not opacified  This would be consistent with a completely occluded cystic duct  PLAN: Return in 3 months for routine interval tube change     Impression: Technically successful tube change using fluoroscopic guidance  Patient with indwelling cholecystostomy tube which cannot be capped or advanced  Surgery is reluctant to operate given the disease burden from the neuroendocrine carcinoma in the liver  One possibility would be selective internal radiation therapy of the right lobe only for the metastatic neuroendocrine carcinoma  This may make for a more appealing situation for cholecystectomy  The presence of a tube in the biliary system, even if it doesn't obviously communicate with the common bile duct, and his performance status are questionable  After discussion with Dr Markus Hurt, and Dr Angle Rodriguez, we will send him to the IR clinic for an opinion on selective internal radiation therapy  I placed a call to the our clinic schedule her and asked them to put a note in the patient's chart to have the evaluating physician contact me for details  This is the end of the clinically relevant portion of this report  Subsequent information is for compliance, documentation, and coding purposes  In the process of informed consent, information was communicated, verbally, to the patient regarding the procedure  The patient was informed of; the name of the procedure, nature of the procedure, nature of the condition, risks, benefits, alternatives, and potential complications, as well as the consequences of not having the procedure  All the patient's questions were answered  Informed consent was signed  Quoted risks include tube failure, leak, or dislodgment  Chlorhexidine and alcohol was used for cleansing and sterile preparation of the skin  This was allowed to dry prior to the application of sterile draping   Timeout was performed, with all team members present, and in agreement  Confirmation of patient, procedure, laterally, allergies, and all needed equipment was performed  PROCEDURE: Tube change PREPROCEDURE DIAGNOSIS: Please see "history ", above POSTPROCEDURE DIAGNOSIS: Same ANTIBIOTICS: None SPECIMEN: None ESTIMATED BLOOD LOSS: None ANESTHESIA: Local FLUOROSCOPY TIME: 7 6 minutes RADIATION DOSE: 158 mGy CONTRAST DOSE: 20 cc Omnipaque 300 Workstation performed: [id number]         Pathology:  Well differentiated neuroendocrine tumor, g1        ASSESSMENT  1  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 [facility])  Ambulatory referral to Radiation Oncology    IR SIR sphere mapping    IR SIR sphere implant    NM liver imaging static only pre SIRS mapping    NM liver spect post SIRS implant     Cancer Staging  No matching staging information was found for the patient  PLAN/DISCUSSION  Orders Placed This Encounter   Procedures    IR SIR sphere mapping    IR SIR sphere implant    NM liver imaging static only pre SIRS mapping    NM liver spect post SIRS implant          Chayito Shirley is a [de-identified]y o  year old male with neuroendocrine carcinoma, being treated with lanreotide with Dr Tano De Leon, who currently has percutaneous cholecystectomy tube  We reviewed results of patient's CT scan from 9/26/19, which shows multiple hypodense lesions in the liver  He has had difficulty with percutaneous tube drainage, with IR recommending consideration of Y-90 to reduce liver tumor burden for possible consideration of cholecystectomy  Patient's liver function tests are within acceptable limits for treatment with SIRS spheres as Tbili is 0 48, and given no progression out side of the liver, it would be reasonable to consider liver directed therapy at this time  We discussed the logistics of treatment with SIRS spheres, including CT mapping approximately 1 week prior to treatment with interventional radiology   We discussed treatment with SIRS spheres will be intended for entire liver  Side effects include but are not limited to fatigue, nausea, vomiting, hepatitis, cholecystitis, pancreatitis, gastritis, decreased liver function, secondary malignancy  PLAN:  Informed consent obtained with plan to coordinate CT mapping with IR, with plan for SIRS spheres to whole liver  Patient and his girlfriend who accompanies him today were in good understanding of these recommendations and all of their questions were answered to their apparent satisfaction  Thank you for  Allowing us to participate in the care of Mr  Genny Villasenor MD  11/22/2019,2:05 PM      Portions of the record may have been created with voice recognition software   Occasional wrong word or "sound a like" substitutions may have occurred due to the inherent limitations of voice recognition software   Read the chart carefully and recognize, using context, where substitutions have occurred

## 2019-11-22 NOTE — PROGRESS NOTES
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  He is referred by Dr Doak Peabody  [de-identified]year old male with a history of coronary artery disease, diabetes mellitus, hypertension, hyperlipidemia, and more recently metastatic neuroendocrine tumor  He was admitted to the hospital in January 2019 with significant weakness s/p vasovagal syncope  CT of abd/pelvis revealed partially calcified mesenteric mass with multiple liver lesions  He also had bilateral hydronephrosis and severe bladder wall thickening with prostatomegaly  A liver biopsy was performed on 1/24/19 showing well-differentiated neuroendocrine tumor grade 2  Dr Doak Peabody started pt on Lanreotide monthly injections in March 2019 9/26/19 CT Abd/Pelvis   IMPRESSION:  Previously seen partially calcified mass over the mesentery measuring 4 3 x 2 5 x 2 2 cm is again noted and is unchanged in appearance  Previously seen left iliac partially calcified metastatic lymph node measuring 1 cm in short axis is again noted   unchanged      No CT evidence for pancreatitis      Percutaneous cholecystostomy tube is again noted         11/5/19 Bloodwork  T-Bili: 0 48  AST: 12  ALT: 23  Alk Phos: 137  Chromogranin A: 379      11/12/19 Med Onc follow-up, RIKA Barth/Dr Jaycee Maldonado to continue Lanreotide injections monthly  Pt has a right abdominal percutaneous cholecystectomy tube draining a small amt of reddish brown fluid  Tube being monitored by IR at [facility]     They have been having some issues with his tube placement/gallstones and are recommending liver targeted therapy with Y 90 Sir spheres in hopes that this will decrease the tumor burden in the liver making surgical cholecystectomy more appealing to the surgical team   Referral to radiation oncology/interventional radiology team at the [facility] to be evaluated to see if he is a candidate for liver directed treatment with Y 80 Sir spheres as recommended by Dr Shahid Rodriguez IR        12/10/19 Dr Chad Arambula follow-up            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 [facility])    1/24/2019 Initial Diagnosis     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 ([facility] [address] )      1/24/2019 Biopsy     A   Liver mass, core biopsy:  - Well differentiated neuroendocrine tumor, G2         3/2019 -  Chemotherapy     Lanreotide 120 mg every 4 weeks injections          Clinical Trial: No    Health Maintenance   Topic Date Due    Depression Screening PHQ  1940    Medicare Annual Wellness Visit (AWV)  1940    Diabetic Foot Exam  02/28/1949    DM Eye Exam  02/28/1949    DTaP,Tdap,and Td Vaccines (1 - Tdap) 02/28/1950    Hepatitis B Vaccine (1 of 3 - Risk 3-dose series) 02/28/1958    Fall Risk  02/28/2004    Pneumococcal Vaccine: 65+ Years (1 of 2 - PCV13) 02/28/2004    Influenza Vaccine  07/01/2019    HEMOGLOBIN A1C  12/14/2019    URINE MICROALBUMIN  07/02/2020    BMI: Adult  11/12/2020    Pneumococcal Vaccine: Pediatrics (0 to 5 Years) and At-Risk Patients (6 to 59 Years)  Aged Out    HIB Vaccine  Aged Out    IPV Vaccine  Aged Out    Hepatitis A Vaccine  Aged Out    Meningococcal ACWY Vaccine  Aged Out    HPV Vaccine  Aged Out       Past Medical History:   Diagnosis Date    Acute pancreatitis without infection or necrosis 9/26/2019    Anemia of chronic renal failure     BPH (benign prostatic hyperplasia)     Cancer ([facility] [address] )     liver cancer    Cardiac disease     Chronic kidney disease, stage 3 ([facility] [address] )     Coronary artery disease     Diabetes mellitus ([facility] [address] )     DJD (degenerative joint disease)     Essential hypertension     Gait disturbance     Generalized weakness     GERD (gastroesophageal reflux disease)     Gout     History of transfusion     Hydronephrosis     Hyperlipidemia     Hypertension     Liver cancer ([facility] [address] )     Pressure injury of skin     Proteinuria     Renal disorder     Retention of urine     Thrombophlebitis migrans     Type 2 diabetes mellitus (HCC)     Type 2 diabetes 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, with long-term current use of insulin ([facility] [address] ) 1/23/2019       Past Surgical History:   Procedure Laterality Date    CORONARY ANGIOPLASTY WITH STENT PLACEMENT      IR IMAGE GUIDED BIOPSY/ASPIRATION LIVER  1/24/2019    IR TUBE PLACEMENT ROQUE  9/6/2019       Family History   Problem Relation Age of Onset    Cancer Mother     Hypertension Father     Cancer Brother     Cancer Maternal Grandmother     Cancer Paternal Aunt        Social History     Tobacco Use    Smoking status: Never Smoker    Smokeless tobacco: Never Used   Substance Use Topics    Alcohol use: Never     Frequency: Never    Drug use: Never          Current Outpatient Medications:     acetaminophen (TYLENOL) 325 mg tablet, Take by mouth as needed, Disp: , Rfl:     amLODIPine (NORVASC) 10 mg tablet, Take 10 mg by mouth daily, Disp: , Rfl:     aspirin 81 MG tablet, Take 81 mg by mouth daily, Disp: , Rfl:     b complex-vitamin C-folic acid (NEPHROCAPS) 1 mg capsule, Take 1 capsule by mouth daily with dinner, Disp: 60 capsule, Rfl: 0    cholecalciferol (VITAMIN D3) 1,000 units tablet, Take 1,000 Units by mouth daily, Disp: , Rfl:     colchicine (COLCRYS) 0 6 mg tablet, Take 0 6 mg by mouth daily, Disp: , Rfl:     insulin detemir (LEVEMIR) 100 units/mL subcutaneous injection, Inject 10 Units under the skin daily at bedtime, Disp: , Rfl:     insulin lispro (HumaLOG) 100 units/mL injection, Inject under the skin 3 (three) times a day before meals, Disp: , Rfl:     Insulin Syringe-Needle U-100 30G X 1/2" 1 ML MISC, by Does not apply route 3 (three) times a day, Disp: 100 each, Rfl: 5    methenamine hippurate (HIPREX) 1 g tablet, Take 1 g by mouth 3 (three) times a day, Disp: , Rfl:     oxyCODONE-acetaminophen (PERCOCET) 5-325 mg per tablet, Take 1 tablet by mouth every 4 (four) hours as needed for moderate pain, Disp: , Rfl:     pantoprazole (PROTONIX) 40 mg tablet, Take 1 tablet (40 mg total) by mouth daily in the early morning, Disp: , Rfl: 0    simethicone (MYLICON) 80 mg chewable tablet, Chew 1 tablet (80 mg total) every 6 (six) hours as needed for flatulence, Disp: 90 tablet, Rfl: 5    sodium bicarbonate 650 mg tablet, Take 1 tablet (650 mg total) by mouth 2 (two) times daily after meals, Disp: , Rfl: 0    sorbitol 70 % solution, Take 15 mL by mouth daily as needed, Disp: , Rfl:     tamsulosin (FLOMAX) 0 4 mg, Take 1 capsule (0 4 mg total) by mouth daily with dinner, Disp: , Rfl: 0    No Known Allergies     Review of Systems:  Review of Systems   Constitutional: Positive for fatigue  Some weight loss when diagnosed but has been gaining weight back, appetite good   HENT: Negative  Eyes: Negative  Respiratory: Positive for shortness of breath (on exertion)  Cardiovascular: Negative  Gastrointestinal: Negative  Endocrine: Negative  Genitourinary:        Chronic urinary catheter, placed in January 2019, urologist changes catheter monthly   Musculoskeletal: Positive for gait problem (ambulates with rolling walker)  Skin:        Perc cholecystectomy drain intact on right side   Allergic/Immunologic: Negative  Neurological: Positive for weakness  Hematological: Negative  Psychiatric/Behavioral: Negative  Vitals:    11/22/19 1241   BP: 146/60   BP Location: Right arm   Pulse: 90   Resp: 18   Temp: (!) 97 3 °F (36 3 °C)   TempSrc: Temporal   SpO2: 99%   Weight: 71 8 kg (158 lb 3 2 oz)   Height: 5' 8" (1 727 m)            Pain assessment: 0    Imaging:No images are attached to the encounter       Teaching: SIR Hugo

## 2019-11-30 ENCOUNTER — APPOINTMENT (EMERGENCY)
Dept: CT IMAGING | Facility: HOSPITAL | Age: 79
End: 2019-11-30
Payer: COMMERCIAL

## 2019-11-30 ENCOUNTER — HOSPITAL ENCOUNTER (EMERGENCY)
Facility: HOSPITAL | Age: 79
Discharge: HOME | End: 2019-11-30
Attending: EMERGENCY MEDICINE | Admitting: EMERGENCY MEDICINE
Payer: COMMERCIAL

## 2019-11-30 VITALS
RESPIRATION RATE: 18 BRPM | WEIGHT: 158 LBS | HEART RATE: 70 BPM | TEMPERATURE: 98 F | SYSTOLIC BLOOD PRESSURE: 145 MMHG | DIASTOLIC BLOOD PRESSURE: 75 MMHG | BODY MASS INDEX: 24.02 KG/M2 | OXYGEN SATURATION: 100 %

## 2019-11-30 DIAGNOSIS — N18.9 CKD (CHRONIC KIDNEY DISEASE): ICD-10-CM

## 2019-11-30 DIAGNOSIS — C7A.8 MALIGNANT NEUROENDOCRINE NEOPLASM (HCC): ICD-10-CM

## 2019-11-30 DIAGNOSIS — R10.9 FLANK PAIN: Primary | ICD-10-CM

## 2019-11-30 DIAGNOSIS — Z97.8 INDWELLING FOLEY CATHETER PRESENT: ICD-10-CM

## 2019-11-30 LAB
ALBUMIN SERPL BCP-MCNC: 3.9 G/DL (ref 3.5–5.7)
ALP SERPL-CCNC: 117 U/L (ref 55–165)
ALT SERPL W P-5'-P-CCNC: 15 U/L (ref 7–52)
ANION GAP SERPL CALCULATED.3IONS-SCNC: 10 MMOL/L (ref 4–13)
AST SERPL W P-5'-P-CCNC: 14 U/L (ref 13–39)
BACTERIA UR QL AUTO: ABNORMAL /HPF
BASOPHILS # BLD AUTO: 0 THOUSANDS/ΜL (ref 0–0.1)
BASOPHILS NFR BLD AUTO: 1 % (ref 0–2)
BILIRUB SERPL-MCNC: 0.5 MG/DL (ref 0.2–1)
BILIRUB UR QL STRIP: NEGATIVE
BUN SERPL-MCNC: 34 MG/DL (ref 7–25)
CALCIUM SERPL-MCNC: 9.1 MG/DL (ref 8.6–10.5)
CHLORIDE SERPL-SCNC: 105 MMOL/L (ref 98–107)
CLARITY UR: ABNORMAL
CO2 SERPL-SCNC: 24 MMOL/L (ref 21–31)
COLOR UR: YELLOW
CREAT SERPL-MCNC: 2.56 MG/DL (ref 0.7–1.3)
EOSINOPHIL # BLD AUTO: 0.1 THOUSAND/ΜL (ref 0–0.61)
EOSINOPHIL NFR BLD AUTO: 1 % (ref 0–5)
ERYTHROCYTE [DISTWIDTH] IN BLOOD BY AUTOMATED COUNT: 15.2 % (ref 11.5–14.5)
GFR SERPL CREATININE-BSD FRML MDRD: 23 ML/MIN/1.73SQ M
GLUCOSE SERPL-MCNC: 76 MG/DL (ref 65–99)
GLUCOSE UR STRIP-MCNC: NEGATIVE MG/DL
HCT VFR BLD AUTO: 34.3 % (ref 42–47)
HGB BLD-MCNC: 11.4 G/DL (ref 14–18)
HGB UR QL STRIP.AUTO: ABNORMAL
KETONES UR STRIP-MCNC: NEGATIVE MG/DL
LACTATE SERPL-SCNC: 1.1 MMOL/L (ref 0.5–2)
LEUKOCYTE ESTERASE UR QL STRIP: ABNORMAL
LIPASE SERPL-CCNC: 45 U/L (ref 11–82)
LYMPHOCYTES # BLD AUTO: 0.9 THOUSANDS/ΜL (ref 0.6–4.47)
LYMPHOCYTES NFR BLD AUTO: 12 % (ref 21–51)
MAGNESIUM SERPL-MCNC: 1.8 MG/DL (ref 1.9–2.7)
MCH RBC QN AUTO: 29.6 PG (ref 26–34)
MCHC RBC AUTO-ENTMCNC: 33.3 G/DL (ref 31–37)
MCV RBC AUTO: 89 FL (ref 81–99)
MONOCYTES # BLD AUTO: 0.6 THOUSAND/ΜL (ref 0.17–1.22)
MONOCYTES NFR BLD AUTO: 7 % (ref 2–12)
NEUTROPHILS # BLD AUTO: 6 THOUSANDS/ΜL (ref 1.4–6.5)
NEUTS SEG NFR BLD AUTO: 79 % (ref 42–75)
NITRITE UR QL STRIP: NEGATIVE
NON-SQ EPI CELLS URNS QL MICRO: ABNORMAL /HPF
OTHER STN SPEC: ABNORMAL
PH UR STRIP.AUTO: 5.5 [PH]
PLATELET # BLD AUTO: 169 THOUSANDS/UL (ref 149–390)
PMV BLD AUTO: 8.5 FL (ref 8.6–11.7)
POTASSIUM SERPL-SCNC: 4 MMOL/L (ref 3.5–5.5)
PROT SERPL-MCNC: 7.2 G/DL (ref 6.4–8.9)
PROT UR STRIP-MCNC: ABNORMAL MG/DL
RBC # BLD AUTO: 3.86 MILLION/UL (ref 4.3–5.9)
RBC #/AREA URNS AUTO: ABNORMAL /HPF
SODIUM SERPL-SCNC: 139 MMOL/L (ref 134–143)
SP GR UR STRIP.AUTO: 1.02 (ref 1–1.03)
UROBILINOGEN UR QL STRIP.AUTO: 0.2 E.U./DL
WBC # BLD AUTO: 7.6 THOUSAND/UL (ref 4.8–10.8)
WBC #/AREA URNS AUTO: ABNORMAL /HPF

## 2019-11-30 PROCEDURE — 83735 ASSAY OF MAGNESIUM: CPT | Performed by: NURSE PRACTITIONER

## 2019-11-30 PROCEDURE — 99285 EMERGENCY DEPT VISIT HI MDM: CPT | Performed by: NURSE PRACTITIONER

## 2019-11-30 PROCEDURE — 87086 URINE CULTURE/COLONY COUNT: CPT | Performed by: NURSE PRACTITIONER

## 2019-11-30 PROCEDURE — 36415 COLL VENOUS BLD VENIPUNCTURE: CPT | Performed by: NURSE PRACTITIONER

## 2019-11-30 PROCEDURE — 83605 ASSAY OF LACTIC ACID: CPT | Performed by: NURSE PRACTITIONER

## 2019-11-30 PROCEDURE — 85025 COMPLETE CBC W/AUTO DIFF WBC: CPT | Performed by: NURSE PRACTITIONER

## 2019-11-30 PROCEDURE — 74150 CT ABDOMEN W/O CONTRAST: CPT

## 2019-11-30 PROCEDURE — 83690 ASSAY OF LIPASE: CPT | Performed by: NURSE PRACTITIONER

## 2019-11-30 PROCEDURE — 81003 URINALYSIS AUTO W/O SCOPE: CPT | Performed by: NURSE PRACTITIONER

## 2019-11-30 PROCEDURE — 80053 COMPREHEN METABOLIC PANEL: CPT | Performed by: NURSE PRACTITIONER

## 2019-11-30 PROCEDURE — 87181 SC STD AGAR DILUTION PER AGT: CPT | Performed by: NURSE PRACTITIONER

## 2019-11-30 PROCEDURE — NC001 PR NO CHARGE: Performed by: RADIOLOGY

## 2019-11-30 PROCEDURE — 87186 SC STD MICRODIL/AGAR DIL: CPT | Performed by: NURSE PRACTITIONER

## 2019-11-30 PROCEDURE — 99284 EMERGENCY DEPT VISIT MOD MDM: CPT

## 2019-11-30 PROCEDURE — 81001 URINALYSIS AUTO W/SCOPE: CPT | Performed by: NURSE PRACTITIONER

## 2019-11-30 PROCEDURE — 87077 CULTURE AEROBIC IDENTIFY: CPT | Performed by: NURSE PRACTITIONER

## 2019-11-30 RX ORDER — SODIUM CHLORIDE 9 MG/ML
125 INJECTION, SOLUTION INTRAVENOUS CONTINUOUS
Status: DISCONTINUED | OUTPATIENT
Start: 2019-11-30 | End: 2019-11-30

## 2019-11-30 RX ORDER — OXYCODONE HYDROCHLORIDE AND ACETAMINOPHEN 5; 325 MG/1; MG/1
1 TABLET ORAL ONCE
Status: COMPLETED | OUTPATIENT
Start: 2019-11-30 | End: 2019-11-30

## 2019-11-30 RX ADMIN — OXYCODONE HYDROCHLORIDE AND ACETAMINOPHEN 1 TABLET: 5; 325 TABLET ORAL at 12:43

## 2019-11-30 NOTE — ED PROVIDER NOTES
History  Chief Complaint   Patient presents with    Flank Pain     right has drain in gallbladder     This is an [de-identified]year old male who has a complex medical history and has a cholecystostomy tube due to cholecystitis  Wife states that the tubing was changed 11/7/19 and on Thursday she attempted to flush the tube and met resistance and did not attempt to flush yesterday or today  Today pt developed pain just below the tube sight  According to EMR pt has several procedures ordered:  IR sphere implant and mapping along with NM liver procedures  He states he feels as though his abdomen is distended  He denies n/v/d  He states that he has a garcia catheter in place and it is draining  Wife states she has been giving tylenol for pain  History provided by:  Medical records, patient and spouse   used: No        Prior to Admission Medications   Prescriptions Last Dose Informant Patient Reported? Taking?    Insulin Syringe-Needle U-100 30G X 1/2" 1 ML MISC   No No   Sig: by Does not apply route 3 (three) times a day   acetaminophen (TYLENOL) 325 mg tablet  Outside Facility (Specify) Yes No   Sig: Take by mouth as needed   amLODIPine (NORVASC) 10 mg tablet  Outside Facility (Specify) Yes No   Sig: Take 10 mg by mouth daily   aspirin 81 MG tablet  Outside Facility (Specify) Yes No   Sig: Take 81 mg by mouth daily   b complex-vitamin C-folic acid (NEPHROCAPS) 1 mg capsule  Outside Facility (Specify) No No   Sig: Take 1 capsule by mouth daily with dinner   cholecalciferol (VITAMIN D3) 1,000 units tablet  Outside Facility (Specify) Yes No   Sig: Take 1,000 Units by mouth daily   colchicine (COLCRYS) 0 6 mg tablet  Outside Facility (Specify) Yes No   Sig: Take 0 6 mg by mouth daily   insulin detemir (LEVEMIR) 100 units/mL subcutaneous injection  Outside Facility (Specify) Yes No   Sig: Inject 10 Units under the skin daily at bedtime   insulin lispro (HumaLOG) 100 units/mL injection  Outside Facility (Specify) Yes No   Sig: Inject under the skin 3 (three) times a day before meals   methenamine hippurate (HIPREX) 1 g tablet  Outside Facility (Specify) Yes No   Sig: Take 1 g by mouth 3 (three) times a day   oxyCODONE-acetaminophen (PERCOCET) 5-325 mg per tablet  Outside Facility (Specify) Yes No   Sig: Take 1 tablet by mouth every 4 (four) hours as needed for moderate pain   pantoprazole (PROTONIX) 40 mg tablet  Outside Facility (Specify) No No   Sig: Take 1 tablet (40 mg total) by mouth daily in the early morning   simethicone (MYLICON) 80 mg chewable tablet   No No   Sig: Chew 1 tablet (80 mg total) every 6 (six) hours as needed for flatulence   sodium bicarbonate 650 mg tablet  Outside Facility (Specify) No No   Sig: Take 1 tablet (650 mg total) by mouth 2 (two) times daily after meals   sorbitol 70 % solution  Outside Facility (Specify) Yes No   Sig: Take 15 mL by mouth daily as needed   tamsulosin (FLOMAX) 0 4 mg  Outside Facility (Specify) No No   Sig: Take 1 capsule (0 4 mg total) by mouth daily with dinner      Facility-Administered Medications: None       Past Medical History:   Diagnosis Date    Acute pancreatitis without infection or necrosis 9/26/2019    Anemia of chronic renal failure     BPH (benign prostatic hyperplasia)     Cancer (HCC)     liver cancer    Cardiac disease     Chronic kidney disease, stage 3 (HCC)     Coronary artery disease     Diabetes mellitus ([facility] [address] )     DJD (degenerative joint disease)     Essential hypertension     Gait disturbance     Generalized weakness     GERD (gastroesophageal reflux disease)     Gout     History of transfusion     Hydronephrosis     Hyperlipidemia     Hypertension     Liver cancer ([facility] [address] )     Pressure injury of skin     Proteinuria     Renal disorder     Retention of urine     Thrombophlebitis migrans     Type 2 diabetes mellitus (HCC)     Type 2 diabetes 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, with long-term current use of insulin ([facility] [address] ) 1/23/2019       Past Surgical History:   Procedure Laterality Date    CORONARY ANGIOPLASTY WITH STENT PLACEMENT      IR IMAGE GUIDED BIOPSY/ASPIRATION LIVER  1/24/2019    IR TUBE PLACEMENT ROQUE  9/6/2019       Family History   Problem Relation Age of Onset    Cancer Mother     Hypertension Father     Cancer Brother     Cancer Maternal Grandmother     Cancer Paternal Aunt      I have reviewed and agree with the history as documented  Social History     Tobacco Use    Smoking status: Never Smoker    Smokeless tobacco: Never Used   Substance Use Topics    Alcohol use: Never     Frequency: Never    Drug use: Never        Review of Systems   Constitutional: Negative  HENT: Negative  Eyes: Negative  Respiratory: Negative  Cardiovascular: Negative  Gastrointestinal: Positive for abdominal pain  Endocrine: Negative  Genitourinary: Negative  Musculoskeletal: Negative  Skin: Negative  Allergic/Immunologic: Negative  Neurological: Negative  Hematological: Negative  Psychiatric/Behavioral: Negative  Physical Exam  Physical Exam   Constitutional: He is oriented to person, place, and time  He appears well-developed and well-nourished  No distress  Pt appears not to feel well but is not toxic appearing    HENT:   Head: Normocephalic and atraumatic  Eyes: Pupils are equal, round, and reactive to light  EOM are normal    Neck: Normal range of motion  Neck supple  Cardiovascular: Normal rate, regular rhythm and normal heart sounds  Pulmonary/Chest: Effort normal and breath sounds normal    Abdominal: He exhibits distension  There is tenderness  TTP just below tube site   Bag is attached and there is dark brown (mud appearing) appearing particles in the bag  No actual fluid      Genitourinary:   Genitourinary Comments: Garcia draining hazy colored straw urine in garcia bag    Musculoskeletal: Normal range of motion     Neurological: He is alert and oriented to person, place, and time  Skin: Skin is warm and dry  Capillary refill takes less than 2 seconds  He is not diaphoretic  Psychiatric: He has a normal mood and affect  His behavior is normal  Judgment and thought content normal    Nursing note and vitals reviewed  Vital Signs  ED Triage Vitals   Temperature Pulse Respirations Blood Pressure SpO2   11/30/19 0931 11/30/19 0931 11/30/19 0931 11/30/19 0931 11/30/19 0931   (!) 97 3 °F (36 3 °C) 72 16 156/78 99 %      Temp Source Heart Rate Source Patient Position - Orthostatic VS BP Location FiO2 (%)   11/30/19 0931 11/30/19 1251 11/30/19 0931 11/30/19 0931 --   Temporal Monitor Lying Left arm       Pain Score       11/30/19 0931       5           Vitals:    11/30/19 0931 11/30/19 1251   BP: 156/78 145/75   Pulse: 72 70   Patient Position - Orthostatic VS: Lying Lying         Visual Acuity      ED Medications  Medications   oxyCODONE-acetaminophen (PERCOCET) 5-325 mg per tablet 1 tablet (1 tablet Oral Given 11/30/19 1243)       Diagnostic Studies  Results Reviewed     Procedure Component Value Units Date/Time    Urine Microscopic [[id number]]  (Abnormal) Collected:  11/30/19 1223    Lab Status:  Final result Specimen:  Urine, Indwelling Haile Catheter Updated:  11/30/19 1252     RBC, UA       Field obscured, unable to enumerate     /hpf     WBC, UA Innumerable /hpf      Epithelial Cells Occasional /hpf      Bacteria, UA Moderate /hpf      OTHER OBSERVATIONS Yeast Cells Present    Urine culture [[id number]] Collected:  11/30/19 1223    Lab Status:   In process Specimen:  Urine, Indwelling Haile Catheter Updated:  11/30/19 1252    UA w Reflex to Microscopic w Reflex to Culture [[id number]]  (Abnormal) Collected:  11/30/19 1223    Lab Status:  Final result Specimen:  Urine, Indwelling Haile Catheter Updated:  11/30/19 1234     Color, UA Yellow     Clarity, UA Turbid     Specific [place], UA 1 020     pH, UA 5 5     Leukocytes, UA 3+     Nitrite, UA Negative Protein, UA 1+ mg/dl      Glucose, UA Negative mg/dl      Ketones, UA Negative mg/dl      Urobilinogen, UA 0 2 E U /dl      Bilirubin, UA Negative     Blood, UA 1+    Lactic acid, plasma [[id number]]  (Normal) Collected:  11/30/19 1059    Lab Status:  Final result Specimen:  Blood from Arm, Right Updated:  11/30/19 1134     LACTIC ACID 1 1 mmol/L     Narrative:       Result may be elevated if tourniquet was used during collection      Comprehensive metabolic panel [[id number]]  (Abnormal) Collected:  11/30/19 1059    Lab Status:  Final result Specimen:  Blood from Arm, Right Updated:  11/30/19 1134     Sodium 139 mmol/L      Potassium 4 0 mmol/L      Chloride 105 mmol/L      CO2 24 mmol/L      ANION GAP 10 mmol/L      BUN 34 mg/dL      Creatinine 2 56 mg/dL      Glucose 76 mg/dL      Calcium 9 1 mg/dL      AST 14 U/L      ALT 15 U/L      Alkaline Phosphatase 117 U/L      Total Protein 7 2 g/dL      Albumin 3 9 g/dL      Total Bilirubin 0 50 mg/dL      eGFR 23 ml/min/1 73sq m     Narrative:       [facility] guidelines for Chronic Kidney Disease (CKD):     Stage 1 with normal or high GFR (GFR > 90 mL/min/1 73 square meters)    Stage 2 Mild CKD (GFR = 60-89 mL/min/1 73 square meters)    Stage 3A Moderate CKD (GFR = 45-59 mL/min/1 73 square meters)    Stage 3B Moderate CKD (GFR = 30-44 mL/min/1 73 square meters)    Stage 4 Severe CKD (GFR = 15-29 mL/min/1 73 square meters)    Stage 5 End Stage CKD (GFR <15 mL/min/1 73 square meters)  Note: GFR calculation is accurate only with a steady state creatinine    Lipase [[id number]]  (Normal) Collected:  11/30/19 1059    Lab Status:  Final result Specimen:  Blood from Arm, Right Updated:  11/30/19 1134     Lipase 45 u/L     Magnesium [[id number]]  (Abnormal) Collected:  11/30/19 1059    Lab Status:  Final result Specimen:  Blood from Arm, Right Updated:  11/30/19 1134     Magnesium 1 8 mg/dL     CBC and differential [[id number]]  (Abnormal) Collected:  11/30/19 1019    Lab Status:  Final result Specimen:  Blood from Hand, Right Updated:  11/30/19 1028     WBC 7 60 Thousand/uL      RBC 3 86 Million/uL      Hemoglobin 11 4 g/dL      Hematocrit 34 3 %      MCV 89 fL      MCH 29 6 pg      MCHC 33 3 g/dL      RDW 15 2 %      MPV 8 5 fL      Platelets 148 Thousands/uL      Neutrophils Relative 79 %      Lymphocytes Relative 12 %      Monocytes Relative 7 %      Eosinophils Relative 1 %      Basophils Relative 1 %      Neutrophils Absolute 6 00 Thousands/µL      Lymphocytes Absolute 0 90 Thousands/µL      Monocytes Absolute 0 60 Thousand/µL      Eosinophils Absolute 0 10 Thousand/µL      Basophils Absolute 0 00 Thousands/µL                  CT abdomen wo contrast   Final Result by Rachel Zarate MD (11/30 1124)         1  Cholecystostomy tube in place, in good position  No abnormal pericholecystic fluid collection  2   Unchanged calcified mesenteric mass, in keeping with patient's known carcinoid tumor  3   Stable hypoattenuating liver lesions, in keeping with patient's known liver metastasis  This are suboptimally evaluated due to lack of intravenous contrast       4   Stable paraceliac and left paraortic lymphadenopathy  Workstation performed: [id number]                    Procedures  Procedures       ED Course  ED Course as of Nov 30 1345   Sat Nov 30, 2019   1040 Hgb 11 4 - was 10 7 3 weeks ago        1147 IMPRESSION:   1   Cholecystostomy tube in place, in good position  No abnormal pericholecystic fluid collection    2   Unchanged calcified mesenteric mass, in keeping with patient's known carcinoid tumor    3   Stable hypoattenuating liver lesions, in keeping with patient's known liver metastasis  This are suboptimally evaluated due to lack of intravenous contrast    4   Stable paraceliac and left paraortic lymphadenopathy    GALLBLADDER:  Again seen is a right anterolateral transhepatic approach cholecystostomy tube with the pigtail tip within the gallbladder (series 2 image 21, series 4 image 31)  Small foci of air are noted within the gallbladder  There is no abnormal   pericholecystic fluid          1148 All labs and radiology results reviewed  Mg 1 8  Cr 2 56 - which is fairly baseline  Hgb 11 4 improved  AST and ALT - normal  Page to IR to discuss CT results  Cholecystostomy tube is in place  No abnormal fluid collection       1220 Pt and wife updated on waiting to speak with IR  Urine ordered  [address]  Dr Byron Adams from IR states that tube placement is appropriate and can wait until Monday  Will speak with Heme-Onc and clear for d/c       1240 Urine has 3+ leukocytes which are chronic in pt urine  No WBC and no nitrates  All labs and radiology results reviewed and discussed with pt and wife  65 Pt states that he wants to be admitted so he wont have to return if he doesn't feel better  I have informed pt that testing baseline and that he can return if needed  Pt verbalizes understanding of dc instructions  1258 Urine chronic elevated WBC and moderate bacteria - sending for culture  /75 (BP Location: Left arm)   Pulse 70   Temp 98 °F (36 7 °C) (Temporal)   Resp 18   Wt 71 7 kg (158 lb)   SpO2 100%   BMI 24 02 kg/m²     Pt and wife verbalize understanding of all dc instructions, follow up and return to ED  MDM  Number of Diagnoses or Management Options  Diagnosis management comments: Right sided abdominal pain at cholecystostomy sight    Plan  Labs  CT A w/contrast if able  Pt has hx of CKD last GFR 28 and Cr 2 17  Will order CT A no contrast     Spoke with Dr Demetrio Feldman who is IR on call for [facility], He states to get labs and CT with contrast and assess tube and then call him back    He states it complication with tube, pt will need to be transferred        Amount and/or Complexity of Data Reviewed  Clinical lab tests: ordered and reviewed  Tests in the radiology section of CPT®: ordered and reviewed  Review and summarize past medical records: yes        Disposition  Final diagnoses:   Flank pain - right   Malignant neuroendocrine neoplasm ([facility] [address] )   Indwelling Haile catheter present   CKD (chronic kidney disease)     Time reflects when diagnosis was documented in both MDM as applicable and the Disposition within this note     Time User Action Codes Description Comment    11/30/2019 12:38 PM [place] Add [R10 9] Flank pain     11/30/2019 12:38 PM Orelizabeth Winnyd [R10 9] Flank pain right    11/30/2019 12:39 PM [place] Add [C7A 8] Malignant neuroendocrine neoplasm ([facility] [address] )     11/30/2019 12:39 PM [place] Add [Z96 0] Indwelling Haile catheter present     11/30/2019 12:41 PM [place] Add [N18 9] CKD (chronic kidney disease)       ED Disposition     ED Disposition Condition Date/Time Comment    Discharge Stable Sat Nov 30, 2019 12:38 PM Mike Leon discharge to home/self care              Follow-up Information     Follow up With Specialties Details Why Contact Info    Kaitlyn Álvarez DO Nephrology Schedule an appointment as soon as possible for a visit in 2 days  [address] [place] [address], [facility]  Emergency Department Emergency Medicine  If symptoms worsen [address] [place] [id number] [phone]          Discharge Medication List as of 11/30/2019 12:40 PM      CONTINUE these medications which have NOT CHANGED    Details   acetaminophen (TYLENOL) 325 mg tablet Take by mouth as needed, Historical Med      amLODIPine (NORVASC) 10 mg tablet Take 10 mg by mouth daily, Historical Med      aspirin 81 MG tablet Take 81 mg by mouth daily, Historical Med      b complex-vitamin C-folic acid (NEPHROCAPS) 1 mg capsule Take 1 capsule by mouth daily with dinner, Starting Sun 7/7/2019, Normal      cholecalciferol (VITAMIN D3) 1,000 units tablet Take 1,000 Units by mouth daily, Historical Med      colchicine (COLCRYS) 0 6 mg tablet Take 0 6 mg by mouth daily, Historical Med      insulin detemir (LEVEMIR) 100 units/mL subcutaneous injection Inject 10 Units under the skin daily at bedtime, Historical Med      insulin lispro (HumaLOG) 100 units/mL injection Inject under the skin 3 (three) times a day before meals, Historical Med      Insulin Syringe-Needle U-100 30G X 1/2" 1 ML MISC by Does not apply route 3 (three) times a day, Starting Mon 10/28/2019, Normal      methenamine hippurate (HIPREX) 1 g tablet Take 1 g by mouth 3 (three) times a day, Historical Med      oxyCODONE-acetaminophen (PERCOCET) 5-325 mg per tablet Take 1 tablet by mouth every 4 (four) hours as needed for moderate pain, Historical Med      pantoprazole (PROTONIX) 40 mg tablet Take 1 tablet (40 mg total) by mouth daily in the early morning, Starting Fri 2/8/2019, No Print      simethicone (MYLICON) 80 mg chewable tablet Chew 1 tablet (80 mg total) every 6 (six) hours as needed for flatulence, Starting Mon 11/18/2019, Normal      sodium bicarbonate 650 mg tablet Take 1 tablet (650 mg total) by mouth 2 (two) times daily after meals, Starting Wed 9/18/2019, No Print      sorbitol 70 % solution Take 15 mL by mouth daily as needed, Historical Med      tamsulosin (FLOMAX) 0 4 mg Take 1 capsule (0 4 mg total) by mouth daily with dinner, Starting Thu 2/7/2019, No Print           No discharge procedures on file      ED Provider  Electronically Signed by           Lilly Dominguez  11/30/19 6956

## 2019-11-30 NOTE — DISCHARGE INSTRUCTIONS
Your Cat scan and labs were normal   No tube dysfunction was noted  You are to follow up with your PCP on Monday  You have percocet at home for pain - you are to take as prescribed  You are to take colace with this to prevent constipation    If symptoms persist return to the ED

## 2019-11-30 NOTE — ED NOTES
Pt asking several times when his tests will be back, this rn explained to pt that the cts are read by radiology  Pt states that he is hungry and wants to eat   I made Lian De Los Santos aware, she states that she is waiting to talk to the radiologist  Pt states that "I should have went to [facility] "     Norm Gonzalez RN  11/30/19 1200

## 2019-11-30 NOTE — TELEMEDICINE
Interventional Radiology Telephone Consult Note:    IR has been consulted to evaluate the patient, determine the appropriate procedure, and whether or not a procedure can and should be performed  I communicated with Jas Kin regarding the care of Chayito Shirley  This is a patient with indwelling percutaneous cholecystostomy tube who presented with abdominal pain and inability to aspirate or flush the tube  CT abdomen pelvis was recommended by me to evaluate the tube and abdominal pain  I reviewed the CT personally  The cholecystostomy tube appears to be along the periphery of the gallbladder and likely within the gallbladder lumen  The gallbladder is decompressed and without evidence acute inflammation  I recommend cholecystostomy tube check on Monday  Thank you for allowing me to participate in the care of Chayito Shirley  Please don't hesitate to call, text, email, or TigerText with any questions  Amaris Cole MD  Interventional Radiologist  [facility]  Personal Cell: [phone]  last [email or web address]

## 2019-12-03 LAB
BACTERIA UR CULT: ABNORMAL
BACTERIA UR CULT: ABNORMAL

## 2019-12-03 NOTE — RESULT ENCOUNTER NOTE
Call placed to patient's PCP Dr Natasha Burks who is well aware of the patient, patient has indwelling Haile catheter and has chronic colonization  PCP aware and will follow on outpatient

## 2019-12-06 ENCOUNTER — TELEPHONE (OUTPATIENT)
Dept: HEMATOLOGY ONCOLOGY | Facility: CLINIC | Age: 79
End: 2019-12-06

## 2019-12-06 NOTE — TELEPHONE ENCOUNTER
The patient was called to complete the blood work prior to the appointment on 12/10/2019  Harish Chase will tell the patient to complete the blood work

## 2019-12-07 ENCOUNTER — TRANSCRIBE ORDERS (OUTPATIENT)
Dept: LAB | Facility: HOSPITAL | Age: 79
End: 2019-12-07

## 2019-12-07 ENCOUNTER — APPOINTMENT (OUTPATIENT)
Dept: LAB | Facility: HOSPITAL | Age: 79
End: 2019-12-07
Payer: COMMERCIAL

## 2019-12-07 DIAGNOSIS — C7B.8 NEUROENDOCRINE CARCINOMA METASTATIC TO LIVER (HCC): ICD-10-CM

## 2019-12-07 DIAGNOSIS — C7A.8 NEUROENDOCRINE CARCINOMA METASTATIC TO LIVER (HCC): ICD-10-CM

## 2019-12-07 LAB
ALBUMIN SERPL BCP-MCNC: 4.1 G/DL (ref 3.5–5.7)
ALP SERPL-CCNC: 126 U/L (ref 55–165)
ALT SERPL W P-5'-P-CCNC: 26 U/L (ref 7–52)
ANION GAP SERPL CALCULATED.3IONS-SCNC: 9 MMOL/L (ref 4–13)
AST SERPL W P-5'-P-CCNC: 20 U/L (ref 13–39)
BASOPHILS # BLD AUTO: 0.2 THOUSANDS/ΜL (ref 0–0.1)
BASOPHILS NFR BLD AUTO: 1 % (ref 0–2)
BILIRUB SERPL-MCNC: 0.6 MG/DL (ref 0.2–1)
BUN SERPL-MCNC: 27 MG/DL (ref 7–25)
CALCIUM SERPL-MCNC: 8.9 MG/DL (ref 8.6–10.5)
CHLORIDE SERPL-SCNC: 106 MMOL/L (ref 98–107)
CO2 SERPL-SCNC: 24 MMOL/L (ref 21–31)
CREAT SERPL-MCNC: 2.34 MG/DL (ref 0.7–1.3)
EOSINOPHIL # BLD AUTO: 0.1 THOUSAND/ΜL (ref 0–0.61)
EOSINOPHIL NFR BLD AUTO: 1 % (ref 0–5)
ERYTHROCYTE [DISTWIDTH] IN BLOOD BY AUTOMATED COUNT: 15 % (ref 11.5–14.5)
GFR SERPL CREATININE-BSD FRML MDRD: 25 ML/MIN/1.73SQ M
GLUCOSE P FAST SERPL-MCNC: 87 MG/DL (ref 65–99)
HCT VFR BLD AUTO: 35.1 % (ref 42–47)
HGB BLD-MCNC: 11.9 G/DL (ref 14–18)
LYMPHOCYTES # BLD AUTO: 1.9 THOUSANDS/ΜL (ref 0.6–4.47)
LYMPHOCYTES NFR BLD AUTO: 17 % (ref 21–51)
MAGNESIUM SERPL-MCNC: 1.6 MG/DL (ref 1.9–2.7)
MCH RBC QN AUTO: 29.9 PG (ref 26–34)
MCHC RBC AUTO-ENTMCNC: 33.8 G/DL (ref 31–37)
MCV RBC AUTO: 89 FL (ref 81–99)
MONOCYTES # BLD AUTO: 1 THOUSAND/ΜL (ref 0.17–1.22)
MONOCYTES NFR BLD AUTO: 9 % (ref 2–12)
NEUTROPHILS # BLD AUTO: 8.4 THOUSANDS/ΜL (ref 1.4–6.5)
NEUTS SEG NFR BLD AUTO: 72 % (ref 42–75)
PLATELET # BLD AUTO: 209 THOUSANDS/UL (ref 149–390)
PMV BLD AUTO: 8.8 FL (ref 8.6–11.7)
POTASSIUM SERPL-SCNC: 3.7 MMOL/L (ref 3.5–5.5)
PROT SERPL-MCNC: 7.2 G/DL (ref 6.4–8.9)
RBC # BLD AUTO: 3.96 MILLION/UL (ref 4.3–5.9)
SODIUM SERPL-SCNC: 139 MMOL/L (ref 134–143)
WBC # BLD AUTO: 11.6 THOUSAND/UL (ref 4.8–10.8)

## 2019-12-07 PROCEDURE — 80053 COMPREHEN METABOLIC PANEL: CPT

## 2019-12-07 PROCEDURE — 85025 COMPLETE CBC W/AUTO DIFF WBC: CPT

## 2019-12-07 PROCEDURE — 36415 COLL VENOUS BLD VENIPUNCTURE: CPT

## 2019-12-07 PROCEDURE — 83735 ASSAY OF MAGNESIUM: CPT

## 2019-12-07 PROCEDURE — 86316 IMMUNOASSAY TUMOR OTHER: CPT

## 2019-12-10 ENCOUNTER — APPOINTMENT (EMERGENCY)
Dept: RADIOLOGY | Facility: HOSPITAL | Age: 79
DRG: 948 | End: 2019-12-10
Payer: COMMERCIAL

## 2019-12-10 ENCOUNTER — OFFICE VISIT (OUTPATIENT)
Dept: HEMATOLOGY ONCOLOGY | Facility: CLINIC | Age: 79
End: 2019-12-10
Payer: COMMERCIAL

## 2019-12-10 ENCOUNTER — APPOINTMENT (OUTPATIENT)
Dept: INTERVENTIONAL RADIOLOGY/VASCULAR | Facility: HOSPITAL | Age: 79
DRG: 948 | End: 2019-12-10
Payer: COMMERCIAL

## 2019-12-10 ENCOUNTER — APPOINTMENT (EMERGENCY)
Dept: CT IMAGING | Facility: HOSPITAL | Age: 79
DRG: 948 | End: 2019-12-10
Payer: COMMERCIAL

## 2019-12-10 ENCOUNTER — HOSPITAL ENCOUNTER (INPATIENT)
Facility: HOSPITAL | Age: 79
LOS: 1 days | Discharge: HOME HEALTH | DRG: 948 | End: 2019-12-12
Attending: EMERGENCY MEDICINE | Admitting: INTERNAL MEDICINE
Payer: COMMERCIAL

## 2019-12-10 VITALS
OXYGEN SATURATION: 98 % | SYSTOLIC BLOOD PRESSURE: 118 MMHG | DIASTOLIC BLOOD PRESSURE: 58 MMHG | TEMPERATURE: 97.7 F | BODY MASS INDEX: 23.79 KG/M2 | HEIGHT: 68 IN | WEIGHT: 157 LBS | HEART RATE: 86 BPM | RESPIRATION RATE: 18 BRPM

## 2019-12-10 DIAGNOSIS — C7A.8 NEUROENDOCRINE CARCINOMA METASTATIC TO LIVER (HCC): Primary | ICD-10-CM

## 2019-12-10 DIAGNOSIS — R06.00 DYSPNEA, UNSPECIFIED TYPE: Primary | ICD-10-CM

## 2019-12-10 DIAGNOSIS — C7B.8 NEUROENDOCRINE CARCINOMA METASTATIC TO LIVER (HCC): Primary | ICD-10-CM

## 2019-12-10 DIAGNOSIS — R10.9 RIGHT FLANK PAIN: ICD-10-CM

## 2019-12-10 PROBLEM — R06.02 SHORTNESS OF BREATH: Status: ACTIVE | Noted: 2019-12-10

## 2019-12-10 LAB
ALBUMIN SERPL BCP-MCNC: 4.1 G/DL (ref 3.5–5.7)
ALP SERPL-CCNC: 124 U/L (ref 55–165)
ALT SERPL W P-5'-P-CCNC: 21 U/L (ref 7–52)
ANION GAP SERPL CALCULATED.3IONS-SCNC: 12 MMOL/L (ref 4–13)
AST SERPL W P-5'-P-CCNC: 16 U/L (ref 13–39)
BASOPHILS # BLD AUTO: 0.1 THOUSANDS/ΜL (ref 0–0.1)
BASOPHILS NFR BLD AUTO: 1 % (ref 0–2)
BILIRUB SERPL-MCNC: 0.4 MG/DL (ref 0.2–1)
BUN SERPL-MCNC: 32 MG/DL (ref 7–25)
CALCIUM SERPL-MCNC: 8.9 MG/DL (ref 8.6–10.5)
CHLORIDE SERPL-SCNC: 105 MMOL/L (ref 98–107)
CO2 SERPL-SCNC: 21 MMOL/L (ref 21–31)
CREAT SERPL-MCNC: 2.3 MG/DL (ref 0.7–1.3)
D DIMER PPP FEU-MCNC: 0.95 UG/ML FEU
EOSINOPHIL # BLD AUTO: 0.1 THOUSAND/ΜL (ref 0–0.61)
EOSINOPHIL NFR BLD AUTO: 1 % (ref 0–5)
ERYTHROCYTE [DISTWIDTH] IN BLOOD BY AUTOMATED COUNT: 15.4 % (ref 11.5–14.5)
GFR SERPL CREATININE-BSD FRML MDRD: 26 ML/MIN/1.73SQ M
GLUCOSE SERPL-MCNC: 119 MG/DL (ref 65–140)
GLUCOSE SERPL-MCNC: 124 MG/DL (ref 65–99)
HCT VFR BLD AUTO: 35.6 % (ref 42–47)
HGB BLD-MCNC: 12 G/DL (ref 14–18)
LYMPHOCYTES # BLD AUTO: 1.3 THOUSANDS/ΜL (ref 0.6–4.47)
LYMPHOCYTES NFR BLD AUTO: 13 % (ref 21–51)
MCH RBC QN AUTO: 29.9 PG (ref 26–34)
MCHC RBC AUTO-ENTMCNC: 33.8 G/DL (ref 31–37)
MCV RBC AUTO: 89 FL (ref 81–99)
MONOCYTES # BLD AUTO: 0.9 THOUSAND/ΜL (ref 0.17–1.22)
MONOCYTES NFR BLD AUTO: 9 % (ref 2–12)
NEUTROPHILS # BLD AUTO: 8 THOUSANDS/ΜL (ref 1.4–6.5)
NEUTS SEG NFR BLD AUTO: 76 % (ref 42–75)
PLATELET # BLD AUTO: 190 THOUSANDS/UL (ref 149–390)
PMV BLD AUTO: 8.8 FL (ref 8.6–11.7)
POTASSIUM SERPL-SCNC: 4.1 MMOL/L (ref 3.5–5.5)
PROT SERPL-MCNC: 7.2 G/DL (ref 6.4–8.9)
RBC # BLD AUTO: 4.02 MILLION/UL (ref 4.3–5.9)
SODIUM SERPL-SCNC: 138 MMOL/L (ref 134–143)
TROPONIN I SERPL-MCNC: <0.03 NG/ML
WBC # BLD AUTO: 10.5 THOUSAND/UL (ref 4.8–10.8)

## 2019-12-10 PROCEDURE — 82948 REAGENT STRIP/BLOOD GLUCOSE: CPT

## 2019-12-10 PROCEDURE — 71046 X-RAY EXAM CHEST 2 VIEWS: CPT

## 2019-12-10 PROCEDURE — C1769 GUIDE WIRE: HCPCS

## 2019-12-10 PROCEDURE — 93005 ELECTROCARDIOGRAM TRACING: CPT

## 2019-12-10 PROCEDURE — 36415 COLL VENOUS BLD VENIPUNCTURE: CPT | Performed by: EMERGENCY MEDICINE

## 2019-12-10 PROCEDURE — 99285 EMERGENCY DEPT VISIT HI MDM: CPT | Performed by: EMERGENCY MEDICINE

## 2019-12-10 PROCEDURE — 84484 ASSAY OF TROPONIN QUANT: CPT | Performed by: EMERGENCY MEDICINE

## 2019-12-10 PROCEDURE — 85379 FIBRIN DEGRADATION QUANT: CPT | Performed by: NURSE PRACTITIONER

## 2019-12-10 PROCEDURE — 0F24X0Z CHANGE DRAINAGE DEVICE IN GALLBLADDER, EXTERNAL APPROACH: ICD-10-PCS | Performed by: RADIOLOGY

## 2019-12-10 PROCEDURE — 99024 POSTOP FOLLOW-UP VISIT: CPT | Performed by: RADIOLOGY

## 2019-12-10 PROCEDURE — 47536 EXCHANGE BILIARY DRG CATH: CPT | Performed by: RADIOLOGY

## 2019-12-10 PROCEDURE — 99214 OFFICE O/P EST MOD 30 MIN: CPT | Performed by: INTERNAL MEDICINE

## 2019-12-10 PROCEDURE — 74176 CT ABD & PELVIS W/O CONTRAST: CPT

## 2019-12-10 PROCEDURE — 85025 COMPLETE CBC W/AUTO DIFF WBC: CPT | Performed by: EMERGENCY MEDICINE

## 2019-12-10 PROCEDURE — C1729 CATH, DRAINAGE: HCPCS

## 2019-12-10 PROCEDURE — 99220 PR INITIAL OBSERVATION CARE/DAY 70 MINUTES: CPT | Performed by: NURSE PRACTITIONER

## 2019-12-10 PROCEDURE — 80053 COMPREHEN METABOLIC PANEL: CPT | Performed by: EMERGENCY MEDICINE

## 2019-12-10 PROCEDURE — 47536 EXCHANGE BILIARY DRG CATH: CPT

## 2019-12-10 PROCEDURE — 99285 EMERGENCY DEPT VISIT HI MDM: CPT

## 2019-12-10 RX ORDER — PANTOPRAZOLE SODIUM 40 MG/1
40 TABLET, DELAYED RELEASE ORAL
Status: DISCONTINUED | OUTPATIENT
Start: 2019-12-11 | End: 2019-12-12 | Stop reason: HOSPADM

## 2019-12-10 RX ORDER — OXYCODONE HYDROCHLORIDE AND ACETAMINOPHEN 5; 325 MG/1; MG/1
1 TABLET ORAL EVERY 4 HOURS PRN
Status: DISCONTINUED | OUTPATIENT
Start: 2019-12-10 | End: 2019-12-12 | Stop reason: HOSPADM

## 2019-12-10 RX ORDER — AMLODIPINE BESYLATE 5 MG/1
10 TABLET ORAL DAILY
Status: DISCONTINUED | OUTPATIENT
Start: 2019-12-11 | End: 2019-12-12 | Stop reason: HOSPADM

## 2019-12-10 RX ORDER — ACETAMINOPHEN 325 MG/1
325 TABLET ORAL AS NEEDED
Status: DISCONTINUED | OUTPATIENT
Start: 2019-12-10 | End: 2019-12-12 | Stop reason: HOSPADM

## 2019-12-10 RX ORDER — COLCHICINE 0.6 MG/1
0.6 TABLET ORAL DAILY
Status: DISCONTINUED | OUTPATIENT
Start: 2019-12-11 | End: 2019-12-12 | Stop reason: HOSPADM

## 2019-12-10 RX ORDER — SIMETHICONE 80 MG
80 TABLET,CHEWABLE ORAL EVERY 6 HOURS PRN
Status: DISCONTINUED | OUTPATIENT
Start: 2019-12-10 | End: 2019-12-12 | Stop reason: HOSPADM

## 2019-12-10 RX ORDER — FENTANYL CITRATE 50 UG/ML
70 INJECTION, SOLUTION INTRAMUSCULAR; INTRAVENOUS
Status: DISCONTINUED | OUTPATIENT
Start: 2019-12-10 | End: 2019-12-10

## 2019-12-10 RX ORDER — HEPARIN SODIUM 5000 [USP'U]/ML
5000 INJECTION, SOLUTION INTRAVENOUS; SUBCUTANEOUS EVERY 8 HOURS SCHEDULED
Status: DISCONTINUED | OUTPATIENT
Start: 2019-12-10 | End: 2019-12-12 | Stop reason: HOSPADM

## 2019-12-10 RX ORDER — SODIUM BICARBONATE 650 MG/1
650 TABLET ORAL
Status: DISCONTINUED | OUTPATIENT
Start: 2019-12-10 | End: 2019-12-12 | Stop reason: HOSPADM

## 2019-12-10 RX ORDER — ASPIRIN 81 MG/1
81 TABLET, CHEWABLE ORAL DAILY
Status: DISCONTINUED | OUTPATIENT
Start: 2019-12-11 | End: 2019-12-12 | Stop reason: HOSPADM

## 2019-12-10 RX ORDER — HYDROMORPHONE HCL/PF 1 MG/ML
0.5 SYRINGE (ML) INJECTION
Status: DISCONTINUED | OUTPATIENT
Start: 2019-12-10 | End: 2019-12-12 | Stop reason: HOSPADM

## 2019-12-10 RX ORDER — TAMSULOSIN HYDROCHLORIDE 0.4 MG/1
0.4 CAPSULE ORAL
Status: DISCONTINUED | OUTPATIENT
Start: 2019-12-11 | End: 2019-12-12 | Stop reason: HOSPADM

## 2019-12-10 RX ORDER — MELATONIN
1000 DAILY
Status: DISCONTINUED | OUTPATIENT
Start: 2019-12-11 | End: 2019-12-12 | Stop reason: HOSPADM

## 2019-12-10 RX ORDER — METHENAMINE HIPPURATE 1000 MG/1
1 TABLET ORAL 3 TIMES DAILY
Status: DISCONTINUED | OUTPATIENT
Start: 2019-12-10 | End: 2019-12-12 | Stop reason: HOSPADM

## 2019-12-10 RX ORDER — CHOLECALCIFEROL (VITAMIN D3) 10 MCG
1 TABLET ORAL
Status: DISCONTINUED | OUTPATIENT
Start: 2019-12-11 | End: 2019-12-12 | Stop reason: HOSPADM

## 2019-12-10 RX ORDER — ONDANSETRON 2 MG/ML
4 INJECTION INTRAMUSCULAR; INTRAVENOUS EVERY 6 HOURS PRN
Status: DISCONTINUED | OUTPATIENT
Start: 2019-12-10 | End: 2019-12-12 | Stop reason: HOSPADM

## 2019-12-10 RX ADMIN — METHENAMINE HIPPURATE 1 G: 1 TABLET ORAL at 20:34

## 2019-12-10 RX ADMIN — HEPARIN SODIUM 5000 UNITS: 5000 INJECTION, SOLUTION INTRAVENOUS; SUBCUTANEOUS at 21:40

## 2019-12-10 RX ADMIN — SODIUM BICARBONATE 650 MG: 650 TABLET ORAL at 21:39

## 2019-12-10 RX ADMIN — FENTANYL CITRATE 70 MCG: 50 INJECTION, SOLUTION INTRAMUSCULAR; INTRAVENOUS at 18:50

## 2019-12-10 RX ADMIN — IOHEXOL 40 ML: 300 INJECTION, SOLUTION INTRAVENOUS at 17:02

## 2019-12-10 RX ADMIN — INSULIN DETEMIR 10 UNITS: 100 INJECTION, SOLUTION SUBCUTANEOUS at 23:00

## 2019-12-10 NOTE — ASSESSMENT & PLAN NOTE
· With intermittent right upper quadrant pain   · Initially was suspected to possible be related to percutaneous cholecystostomy tube  · Tube check/exchanged was done by IR today   · Unclear etiology   · Patient with mild elevation of WBC from baseline of 6-7K to 10K   · CXR shows no acute infiltrates  · Check D-dimer   · Oxygen supplement as needed   · Pain control

## 2019-12-10 NOTE — ASSESSMENT & PLAN NOTE
Malnutrition Findings:           BMI Findings: Body mass index is 23 87 kg/m²     · Dietitian consult   · Dietary supplement as indicated

## 2019-12-10 NOTE — ASSESSMENT & PLAN NOTE
· Follows with Dr Gretta Epley hem/onc OP   · He was found not to be a candidate for the [facility]   · Currently on Lanreotide on a monthly basis   · Supportive care

## 2019-12-10 NOTE — H&P
H&P- Edgardo Allison 1940, [de-identified] y o  male MRN: [id number]    Unit/Bed#: -01 Encounter: [id number]    Primary Care Provider: Kalia Crawford DO   Date and time admitted to hospital: 12/10/2019 11:54 AM        * Shortness of breath  Assessment & Plan  · With intermittent right upper quadrant pain   · Initially was suspected to possible be related to percutaneous cholecystostomy tube  · Tube check/exchanged was done by IR today   · Unclear etiology   · Patient with mild elevation of WBC from baseline of 6-7K to 10K   · CXR shows no acute infiltrates  · Check D-dimer   · Oxygen supplement as needed   · Pain control    CKD (chronic kidney disease) stage 3, GFR 30-59 ml/min (HCC)  Assessment & Plan  · Baseline Cr 2 3-2 5 mg/dL   · Currently at baseline   · Will continue to monitor renal function closely  · Avoid nephrotoxins    Biliary sludge  Assessment & Plan  · With percutaneous cholecystostomy tube  · The patient is not a candidate for cholecystectomy   · IR tube check/tube exchange done today   · No apparent occlusion or residual contrast fluid in the gallbladder; contrast did not produce colic   · Final read CT a/p is pending     Moderate protein-calorie malnutrition (HCC)  Assessment & Plan  Malnutrition Findings:           BMI Findings: Body mass index is 23 87 kg/m²     · Dietitian consult   · Dietary supplement as indicated     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 [facility])  Assessment & Plan  · Follows with Dr Bishop Calvin hem/onc OP   · He was found not to be a candidate for the [facility] Sphere treatment   · Currently on Lanreotide on a monthly basis   · Supportive care     Essential hypertension  Assessment & Plan  · BP is well-controlled   · Continue with amlodipine     Type 2 diabetes 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, with long-term current use of insulin ([facility] [address] )  Assessment & Plan  Lab Results   Component Value Date    HGBA1C 8 1 (H) 06/14/2019       No results for input(s): POCGLU in the last 72 hours  Blood Sugar Average: Last 72 hrs:  · will resume lantus   · ISS       VTE Prophylaxis: Heparin  Code Status: Full code   POLST: POLST is not applicable to this patient  Discussion with family: girlfriend     Anticipated Length of Stay:  Patient will be admitted on an Observation basis with an anticipated length of stay of  < 2 midnights  Justification for Hospital Stay: shortness of breath with RUQ pain     Chief Complaint:   Shortness of breath with RUQ pain     History of Present Illness:    Aldair Penny is a [de-identified] y o  male who presents with shortness of breath with right flank pain  The patient with history of metastatic neuroendocrine carcinoma, presented today with worsening dyspnea and increasing right upper quadrant pain started approximately 3 weeks ago  He had percutaneous cholecystostomy tube exchanged 3 weeks ago where the patient states that a bulb suction was switched to a bag drain  He had episodes that a bag fell out of his pocket and was pulling at the tube  He describes the pain as sharp, intermittent, non-radiated rating 5-6/10 intensity, no aggravating factors, associated with dyspnea  He was taking tylenol at home with minimal improvement  Patient does have percocet at home but only tried it once or twice over the past 3 weeks  He denies nausea, vomiting, anorexia, no weight loss  Review of Systems:  Review of Systems   Constitutional: Negative for chills, fatigue and fever  HENT: Negative for congestion, ear pain, postnasal drip and sore throat  Eyes: Negative for discharge, itching and visual disturbance  Respiratory: Positive for shortness of breath  Negative for cough and chest tightness  Cardiovascular: Negative for chest pain, palpitations and leg swelling  Gastrointestinal: Positive for abdominal pain (RUQ )  Negative for nausea and vomiting  Endocrine: Negative for cold intolerance and heat intolerance     Genitourinary: Negative for difficulty urinating, dysuria and hematuria  Chronic garcia catheter    Musculoskeletal: Negative for back pain, gait problem and neck pain  Skin: Negative for rash and wound  Neurological: Negative for dizziness, speech difficulty, weakness, light-headedness and headaches  Psychiatric/Behavioral: Negative for hallucinations and self-injury  The patient is not nervous/anxious  Past Medical and Surgical History:   Past Medical History:   Diagnosis Date    Acute pancreatitis without infection or necrosis 9/26/2019    Anemia of chronic renal failure     BPH (benign prostatic hyperplasia)     Cancer (HCC)     liver cancer    Cardiac disease     Chronic kidney disease, stage 3 (HCC)     Coronary artery disease     Diabetes mellitus (HCC)     DJD (degenerative joint disease)     Essential hypertension     Gait disturbance     Generalized weakness     GERD (gastroesophageal reflux disease)     Gout     History of transfusion     Hydronephrosis     Hyperlipidemia     Hypertension     Liver cancer ([facility] [address] )     Pressure injury of skin     Proteinuria     Renal disorder     Retention of urine     Thrombophlebitis migrans     Type 2 diabetes mellitus (HCC)     Type 2 diabetes 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, with long-term current use of insulin ([facility] [address] ) 1/23/2019       Past Surgical History:   Procedure Laterality Date    CORONARY ANGIOPLASTY WITH STENT PLACEMENT      IR IMAGE GUIDED BIOPSY/ASPIRATION LIVER  1/24/2019    IR TUBE PLACEMENT ROQUE  9/6/2019       Meds/Allergies:  Prior to Admission medications    Medication Sig Start Date End Date Taking?  Authorizing Provider   acetaminophen (TYLENOL) 325 mg tablet Take by mouth as needed    Historical Provider, MD   amLODIPine (NORVASC) 10 mg tablet Take 10 mg by mouth daily    Historical Provider, MD   aspirin 81 MG tablet Take 81 mg by mouth daily    Historical Provider, MD escobar complex-vitamin C-folic acid (NEPHROCAPS) 1 mg capsule Take 1 capsule by mouth daily with dinner 7/7/19   Josephine Nyhan, MD   cholecalciferol (VITAMIN D3) 1,000 units tablet Take 1,000 Units by mouth daily    Historical Provider, MD   colchicine (COLCRYS) 0 6 mg tablet Take 0 6 mg by mouth daily    Historical Provider, MD   insulin detemir (LEVEMIR) 100 units/mL subcutaneous injection Inject 10 Units under the skin daily at bedtime    Historical Provider, MD   insulin lispro (HumaLOG) 100 units/mL injection Inject under the skin 3 (three) times a day before meals    Historical Provider, MD   Insulin Syringe-Needle U-100 30G X 1/2" 1 ML MISC by Does not apply route 3 (three) times a day 10/28/19   Oumar De La Vega DO   methenamine hippurate (HIPREX) 1 g tablet Take 1 g by mouth 3 (three) times a day    Historical Provider, MD   oxyCODONE-acetaminophen (PERCOCET) 5-325 mg per tablet Take 1 tablet by mouth every 4 (four) hours as needed for moderate pain    Historical Provider, MD   pantoprazole (PROTONIX) 40 mg tablet Take 1 tablet (40 mg total) by mouth daily in the early morning 2/8/19   Nicole De Jesus MD   simethicone (MYLICON) 80 mg chewable tablet Chew 1 tablet (80 mg total) every 6 (six) hours as needed for flatulence 11/18/19   Oumar De La Vega DO   sodium bicarbonate 650 mg tablet Take 1 tablet (650 mg total) by mouth 2 (two) times daily after meals 9/18/19   Eri Oneil MD   sorbitol 70 % solution Take 15 mL by mouth daily as needed    Historical Provider, MD   tamsulosin (FLOMAX) 0 4 mg Take 1 capsule (0 4 mg total) by mouth daily with dinner 2/7/19   Nicole De Jesus MD     I have reviewed home medications with patient family member  Allergies: No Known Allergies    Social History:  Marital Status:     Occupation: retired   Patient Pre-hospital Living Situation: family   Patient Pre-hospital Level of Mobility: assist   Patient Pre-hospital Diet Restrictions: diabetic   Substance Use History:   Social History     Substance and Sexual Activity Alcohol Use Never    Frequency: Never     Social History     Tobacco Use   Smoking Status Never Smoker   Smokeless Tobacco Never Used     Social History     Substance and Sexual Activity   Drug Use Never       Family History:  I have reviewed the patients family history    Physical Exam:   Vitals:   Blood Pressure: (!) 185/78 (12/10/19 1924)  Pulse: 103 (12/10/19 1924)  Temperature: 98 5 °F (36 9 °C) (12/10/19 1924)  Temp Source: Temporal (12/10/19 1924)  Respirations: 20 (12/10/19 1924)  Height: 5' 8" (172 7 cm) (12/10/19 1933)  Weight - Scale: 70 5 kg (155 lb 6 8 oz) (12/10/19 1933)  SpO2: 96 % (12/10/19 1924)    Physical Exam   Constitutional: He is oriented to person, place, and time  He appears well-developed  No distress  Chronically ill and frail      HENT:   Head: Normocephalic and atraumatic  Mouth/Throat: Oropharynx is clear and moist    Eyes: Pupils are equal, round, and reactive to light  Conjunctivae and EOM are normal    Neck: Normal range of motion  Neck supple  No thyromegaly present  Cardiovascular: Normal rate, regular rhythm, normal heart sounds and intact distal pulses  Pulmonary/Chest: Effort normal  No respiratory distress  He has no wheezes  Decreased RLL     Abdominal: Soft  Bowel sounds are normal  He exhibits no distension  There is no tenderness (mild on palpation )  Percutaneous cholecystostomy tube in place    Genitourinary:   Genitourinary Comments: Chronic agrcia catheter- clear yellow     Musculoskeletal: Normal range of motion  He exhibits no edema or deformity  Neurological: He is alert and oriented to person, place, and time  He has normal reflexes  No cranial nerve deficit  Skin: Skin is warm and dry  No erythema  Psychiatric: He has a normal mood and affect  His behavior is normal  Thought content normal    Vitals reviewed  Additional Data:   Lab Results: I have personally reviewed pertinent reports      Results from last 7 days   Lab Units 12/10/19  1222 WBC Thousand/uL 10 50   HEMOGLOBIN g/dL 12 0*   HEMATOCRIT % 35 6*   PLATELETS Thousands/uL 190   NEUTROS PCT % 76*   LYMPHS PCT % 13*   MONOS PCT % 9   EOS PCT % 1     Results from last 7 days   Lab Units 12/10/19  1222   POTASSIUM mmol/L 4 1   CHLORIDE mmol/L 105   CO2 mmol/L 21   BUN mg/dL 32*   CREATININE mg/dL 2 30*   CALCIUM mg/dL 8 9   ALK PHOS U/L 124   ALT U/L 21   AST U/L 16                   Imaging: I have personally reviewed pertinent reports  CT abdomen pelvis wo contrast   Final Result by Carlito Torres DO (12/10 2028)      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  Limited evaluation of the hepatic parenchyma due to lack of contrast   Subtle low density lesions are seen consistent with hepatic metastasis  There are small mesenteric and para-aortic nodes identified which are unchanged in size and configuration  Small nodes in the right epicardial fat seen on series 2 images 13 through 15 appears slightly larger  Workstation performed: [id number]         XR chest 2 views   ED Interpretation by Lila Ulloa DO (12/10 1609)   Right middle lobe linear consolidation versus atelectasis, favor atelectasis      IR tube change    (Results Pending)       EKG, Pathology, and Other Studies Reviewed on Admission:   · EK/25 NSR HR 79    NetAccess / Epic Records Reviewed: Yes     ** Please Note: This note has been constructed using a voice recognition system   **

## 2019-12-10 NOTE — PROGRESS NOTES
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 [facility])  The patient was found not to be a candidate for the [facility] Sphere treatment according to the Radiation Oncology team   He will be continued on the Lanreotide on a monthly basis for his metastatic low-grade neuroendocrine carcinoma which seems to be controlling his diarrhea in his symptoms  We will continue to monitor the chromogranin a as a tumor marker on a monthly basis  - Transfer to other facility  - CBC and differential; Future  - Comprehensive metabolic panel; Future  - Magnesium; Future  - Chromogranin A; Future    2  Right flank pain  The patient seems to be in severe pain at the site of the tube insertion in the right flank area which started couple days ago  His pain is getting worse and his breathing is getting shallow  I did offer him to go to the emergency room today for further evaluation of his pain and he agreed with the plan  HPI:  The patient came today for a follow-up visit  He complained today about severe at the site of the tube insertion of the percutaneous cholecystostomy which started last Thursday needs getting worse  He also complained about shallow  breathing  The patient was evaluated by the Radiation Oncology team just recently and was found not to be a candidate for the [facility] treatment  His most recent blood work on 12/07/2019 showed creatinine of 2 34 with normal liver enzymes and total bilirubin of 0 6  His white cell count went up to 11 6 with hemoglobin of 11 9 and platelet count of 227  Magnesium of 1 6  His chromogranin a serum level is still pending  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 ([facility] [address] )    1/24/2019 Initial Diagnosis     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 ([facility] [address] )      1/24/2019 Biopsy     A   Liver mass, core biopsy:  - Well differentiated neuroendocrine tumor, G2         3/2019 -  Chemotherapy     Lanreotide 120 mg every 4 weeks injections          Interval history:    ROS: Review of Systems   Constitutional: Positive for fatigue  Negative for appetite change, diaphoresis and fever  HENT: Negative for congestion, dental problem, facial swelling, hearing loss, tinnitus and trouble swallowing  Eyes: Negative for visual disturbance  Respiratory: Positive for shortness of breath  Negative for cough, chest tightness and wheezing  Cardiovascular: Negative for chest pain and leg swelling  Gastrointestinal: Positive for abdominal pain ( at the site of the tube insertion on the right flank)  Negative for abdominal distention, blood in stool, constipation, diarrhea, nausea and vomiting  Genitourinary: Positive for flank pain (Right flank pain)  Negative for dysuria, hematuria and urgency  Musculoskeletal: Negative for arthralgias, myalgias and neck pain  Skin: Negative  Negative for color change, pallor, rash and wound  Neurological: Positive for numbness  Negative for dizziness, weakness and headaches  Hematological: Negative for adenopathy  Psychiatric/Behavioral: Negative for agitation, behavioral problems, confusion, hallucinations, self-injury and sleep disturbance  The patient is not nervous/anxious and is not hyperactive          Past Medical History:   Diagnosis Date    Acute pancreatitis without infection or necrosis 9/26/2019    Anemia of chronic renal failure     BPH (benign prostatic hyperplasia)     Cancer (HCC)     liver cancer    Cardiac disease     Chronic kidney disease, stage 3 (HCC)     Coronary artery disease     Diabetes mellitus ([address] )     DJD (degenerative joint disease)     Essential hypertension     Gait disturbance     Generalized weakness     GERD (gastroesophageal reflux disease)     Gout     History of transfusion     Hydronephrosis     Hyperlipidemia     Hypertension     Liver cancer ([address] )  Pressure injury of skin     Proteinuria     Renal disorder     Retention of urine     Thrombophlebitis migrans     Type 2 diabetes mellitus (HCC)     Type 2 diabetes 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, with long-term current use of insulin ([facility] [address] ) 1/23/2019       Past Surgical History:   Procedure Laterality Date    CORONARY ANGIOPLASTY WITH STENT PLACEMENT      IR IMAGE GUIDED BIOPSY/ASPIRATION LIVER  1/24/2019    IR TUBE PLACEMENT ROQUE  9/6/2019       Social History     Socioeconomic History    Marital status:       Spouse name: None    Number of children: None    Years of education: None    Highest education level: None   Occupational History    None   Social Needs    Financial resource strain: None    Food insecurity:     Worry: None     Inability: None    Transportation needs:     Medical: None     Non-medical: None   Tobacco Use    Smoking status: Never Smoker    Smokeless tobacco: Never Used   Substance and Sexual Activity    Alcohol use: Never     Frequency: Never    Drug use: Never    Sexual activity: Never   Lifestyle    Physical activity:     Days per week: None     Minutes per session: None    Stress: None   Relationships    Social connections:     Talks on phone: None     Gets together: None     Attends [employer] service: None     Active member of club or organization: None     Attends meetings of clubs or organizations: None     Relationship status: None    Intimate partner violence:     Fear of current or ex partner: None     Emotionally abused: None     Physically abused: None     Forced sexual activity: None   Other Topics Concern    None   Social History Narrative    None       Family History   Problem Relation Age of Onset    Cancer Mother     Hypertension Father     Cancer Brother     Cancer Maternal Grandmother     Cancer Paternal Aunt        No Known Allergies      Current Outpatient Medications:     acetaminophen (TYLENOL) 325 mg tablet, Take by mouth as needed, Disp: , Rfl:     amLODIPine (NORVASC) 10 mg tablet, Take 10 mg by mouth daily, Disp: , Rfl:     aspirin 81 MG tablet, Take 81 mg by mouth daily, Disp: , Rfl:     b complex-vitamin C-folic acid (NEPHROCAPS) 1 mg capsule, Take 1 capsule by mouth daily with dinner, Disp: 60 capsule, Rfl: 0    cholecalciferol (VITAMIN D3) 1,000 units tablet, Take 1,000 Units by mouth daily, Disp: , Rfl:     colchicine (COLCRYS) 0 6 mg tablet, Take 0 6 mg by mouth daily, Disp: , Rfl:     insulin detemir (LEVEMIR) 100 units/mL subcutaneous injection, Inject 10 Units under the skin daily at bedtime, Disp: , Rfl:     insulin lispro (HumaLOG) 100 units/mL injection, Inject under the skin 3 (three) times a day before meals, Disp: , Rfl:     Insulin Syringe-Needle U-100 30G X 1/2" 1 ML MISC, by Does not apply route 3 (three) times a day, Disp: 100 each, Rfl: 5    methenamine hippurate (HIPREX) 1 g tablet, Take 1 g by mouth 3 (three) times a day, Disp: , Rfl:     oxyCODONE-acetaminophen (PERCOCET) 5-325 mg per tablet, Take 1 tablet by mouth every 4 (four) hours as needed for moderate pain, Disp: , Rfl:     pantoprazole (PROTONIX) 40 mg tablet, Take 1 tablet (40 mg total) by mouth daily in the early morning, Disp: , Rfl: 0    simethicone (MYLICON) 80 mg chewable tablet, Chew 1 tablet (80 mg total) every 6 (six) hours as needed for flatulence, Disp: 90 tablet, Rfl: 5    sodium bicarbonate 650 mg tablet, Take 1 tablet (650 mg total) by mouth 2 (two) times daily after meals, Disp: , Rfl: 0    sorbitol 70 % solution, Take 15 mL by mouth daily as needed, Disp: , Rfl:     tamsulosin (FLOMAX) 0 4 mg, Take 1 capsule (0 4 mg total) by mouth daily with dinner, Disp: , Rfl: 0      Physical Exam:  /58 (BP Location: Left arm, Cuff Size: Adult)   Pulse 86   Temp 97 7 °F (36 5 °C) (Tympanic)   Resp 18   Ht 5' 8" (1 727 m)   Wt 71 2 kg (157 lb)   SpO2 98%   BMI 23 87 kg/m²     Physical Exam   Constitutional: He is oriented to person, place, and time  He appears well-developed and well-nourished  HENT:   Head: Normocephalic and atraumatic  Nose: Nose normal    Mouth/Throat: Oropharynx is clear and moist    Eyes: Pupils are equal, round, and reactive to light  Conjunctivae and EOM are normal  Right eye exhibits no discharge  Left eye exhibits no discharge  No scleral icterus  Neck: Normal range of motion  Neck supple  No tracheal deviation present  No thyromegaly present  Cardiovascular: Normal rate, regular rhythm and normal heart sounds  Exam reveals no friction rub  No murmur heard  Pulmonary/Chest: Effort normal and breath sounds normal  No respiratory distress  He has no wheezes  He has no rales  He exhibits no tenderness  Abdominal: Soft  Bowel sounds are normal  He exhibits no distension and no mass  There is no hepatosplenomegaly, splenomegaly or hepatomegaly  There is tenderness (At the insertion site of the tube in the right flank upper abdominal area)  There is no rebound and no guarding  Musculoskeletal: Normal range of motion  He exhibits no edema, tenderness or deformity  Lymphadenopathy:     He has no cervical adenopathy  Neurological: He is alert and oriented to person, place, and time  He has normal reflexes  He displays normal reflexes  No cranial nerve deficit  Coordination normal    Skin: Skin is warm and dry  No rash noted  No erythema  No pallor  Psychiatric: He has a normal mood and affect   His behavior is normal  Judgment and thought content normal          Labs:  Lab Results   Component Value Date    WBC 11 60 (H) 12/07/2019    HGB 11 9 (L) 12/07/2019    HCT 35 1 (L) 12/07/2019    MCV 89 12/07/2019     12/07/2019     Lab Results   Component Value Date    K 3 7 12/07/2019     12/07/2019    CO2 24 12/07/2019    BUN 27 (H) 12/07/2019    CREATININE 2 34 (H) 12/07/2019    GLUF 87 12/07/2019    CALCIUM 8 9 12/07/2019    AST 20 12/07/2019    ALT 26 12/07/2019    ALKPHOS 126 12/07/2019    EGFR 25 12/07/2019     No results found for: TSH    Patient voiced understanding and agreement in the above discussion  Aware to contact our office with questions/symptoms in the interim

## 2019-12-10 NOTE — ASSESSMENT & PLAN NOTE
Lab Results   Component Value Date    HGBA1C 8 1 (H) 06/14/2019       No results for input(s): POCGLU in the last 72 hours      Blood Sugar Average: Last 72 hrs:  · will resume lantus   · ISS

## 2019-12-10 NOTE — ASSESSMENT & PLAN NOTE
· With percutaneous cholecystostomy tube  · The patient is not a candidate for cholecystectomy   · IR tube check/tube exchange done today   · No apparent occlusion or residual contrast fluid in the gallbladder; contrast did not produce colic   · Final read CT a/p is pending

## 2019-12-10 NOTE — PROCEDURES
Interventional Radiology Procedure Note    PATIENT NAME: Anton Vera  : 1940  MRN: [id number]     Pre-op Diagnosis:   1  Dyspnea, unspecified type      2  Indwelling percutaneous cholecystostomy tube in the right upper quadrant  Now with abdomen pain  Patient reports he has has pain since previous tube change, when the tube was switch from suction bulb drainage to bag drainage  He also has metastatic neuroendocrine carcinoma  Surgery has decline cholecystectomy due to term were burden in the right liver  He was recently evaluated by interventional radiology  He was thought a suboptimal candidate for selective internal radiation therapy, but a good candidate for bland embolization  The plan is to perform bland embolization in the future, to facilitate cholecystectomy  Right now, he has abdomen pain  His white count is down slightly over the last few days, but is still slightly elevated at 10 5  Post-op Diagnosis:   1  Dyspnea, unspecified type      2  Same    Procedure: Tube change    Surgeon:   Eduardo Hutchins MD  Assistants:     No qualified resident was available, Resident is only observing    Estimated Blood Loss: None    Findings: The gallbladder is contracted  The tube was in the gallbladder  Several decide old head withdrawn slightly  The tube was removed over wire  A new tube was placed  The tube was sutured in place  Contrast injection did outline a patent cystic duct and patent common bile duct  This represents an improvement over the previous check  Overall, we of a patient with metastatic carcinoma and right upper quadrant pain  It does not appear as though the tube was occluded, or there was any residual fluid in the gallbladder  Injecting the gallbladder with contrast did not produce colic  The previously seen stone, that had been occluding the cystic duct is now gone  Overall the gallbladder actually looks better than before    I recommended a CT scan to evaluate for other etiologies of abdomen pain  Patricio Benitez     Specimens: None     Complications:  None     Anesthesia: None    Cathryn Rojas MD     Date: 12/10/2019  Time: 6:02 PM

## 2019-12-10 NOTE — ED PROVIDER NOTES
Emergency Department Note    Encounter Date 12/10/2019    Diagnosis  1  Dyspnea, unspecified type        MDM  Number of Diagnoses or Management Options  Dyspnea, unspecified type:   Diagnosis management comments: Presents with shortness of breath  Afebrile  VSS  Exam reveals nontoxic appearing patient in no acute distress with clear breath sounds equal bilaterally, Haile catheter in place and biliary drain in place no signs of infection  Labs reveal no clinically significant change from baseline  Troponin WNL  ECG reveals sinus rhythm with LAD and LVH with repolarization abnormality no acute ischemic changes  Diagnostic imaging reveals linear consolidation versus atelectasis right middle lobe  Clinical suspicion for infectious process is very low at the time of this report  I suspect this patient's dyspnea is secondary to having the biliary drain in place irritating the right side of the diaphragm  On final evaluation in preparation for discharge patient's family reported that patient's oncologist sent him to the emergency department specifically to be admitted to the hospital for addressing the biliary drain which he too is reported to believe is causing the patient's respiratory discomfort  Patient was seen and evaluated by IR in the emergency department and taken to the Radiology suite for intervention and change out of drain  Preliminary read on CT abdomen pelvis by Radiology is unremarkable with no acute intra-abdominal pelvic process to otherwise explain patient's symptoms  No clear etiology at the time of this report  Discussed with patient's oncologist who suggested the patient is still symptomatic he should be admitted  Patient and spouse requested pain medications  Gave fentanyl  Discussed with hospitalist who is happy to admit        CC  Chief Complaint   Patient presents with    Shortness of Breath     According to the patient, he has felt SOB and for a couple days following ambulation  Patient also report problems with bilary tube  Complicated PMH significant for HTN, hyperlipidemia with PSH significant for biliary drain complains of gradual onset difficulty breathing due to dull achy constant right upper quadrant lower chest pain and discomfort associated with taking deep breaths over the past 3 weeks since placement of biliary drain  Denies fever, rash, joint pain/swelling, headache, vision changes, hearing changes, chest pain, abdominal pain and changes in bladder habits             History    Current Facility-Administered Medications:     fentanyl citrate (PF) 100 MCG/2ML 70 mcg, 70 mcg, Intravenous, Q30 Min PRN, Gia Lebron DO    Current Outpatient Medications:     acetaminophen (TYLENOL) 325 mg tablet, Take by mouth as needed, Disp: , Rfl:     amLODIPine (NORVASC) 10 mg tablet, Take 10 mg by mouth daily, Disp: , Rfl:     aspirin 81 MG tablet, Take 81 mg by mouth daily, Disp: , Rfl:     b complex-vitamin C-folic acid (NEPHROCAPS) 1 mg capsule, Take 1 capsule by mouth daily with dinner, Disp: 60 capsule, Rfl: 0    cholecalciferol (VITAMIN D3) 1,000 units tablet, Take 1,000 Units by mouth daily, Disp: , Rfl:     colchicine (COLCRYS) 0 6 mg tablet, Take 0 6 mg by mouth daily, Disp: , Rfl:     insulin detemir (LEVEMIR) 100 units/mL subcutaneous injection, Inject 10 Units under the skin daily at bedtime, Disp: , Rfl:     insulin lispro (HumaLOG) 100 units/mL injection, Inject under the skin 3 (three) times a day before meals, Disp: , Rfl:     Insulin Syringe-Needle U-100 30G X 1/2" 1 ML MISC, by Does not apply route 3 (three) times a day, Disp: 100 each, Rfl: 5    methenamine hippurate (HIPREX) 1 g tablet, Take 1 g by mouth 3 (three) times a day, Disp: , Rfl:     oxyCODONE-acetaminophen (PERCOCET) 5-325 mg per tablet, Take 1 tablet by mouth every 4 (four) hours as needed for moderate pain, Disp: , Rfl:     pantoprazole (PROTONIX) 40 mg tablet, Take 1 tablet (40 mg total) by mouth daily in the early morning, Disp: , Rfl: 0    simethicone (MYLICON) 80 mg chewable tablet, Chew 1 tablet (80 mg total) every 6 (six) hours as needed for flatulence, Disp: 90 tablet, Rfl: 5    sodium bicarbonate 650 mg tablet, Take 1 tablet (650 mg total) by mouth 2 (two) times daily after meals, Disp: , Rfl: 0    sorbitol 70 % solution, Take 15 mL by mouth daily as needed, Disp: , Rfl:     tamsulosin (FLOMAX) 0 4 mg, Take 1 capsule (0 4 mg total) by mouth daily with dinner, Disp: , Rfl: 0     Past Medical History:   Diagnosis Date    Acute pancreatitis without infection or necrosis 9/26/2019    Anemia of chronic renal failure     BPH (benign prostatic hyperplasia)     Cancer (HCC)     liver cancer    Cardiac disease     [facility] disease, [facility] 3 [facility]     Coronary artery disease     Diabetes mellitus ([facility]     DJD (degenerative joint disease)     Essential hypertension     Gait disturbance     Generalized weakness     GERD (gastroesophageal reflux disease)     Gout     History of transfusion     Hydronephrosis     Hyperlipidemia     Hypertension     Liver cancer ([facility]     Pressure injury of skin     Proteinuria     Renal disorder     Retention of urine     Thrombophlebitis migrans     Type 2 diabetes mellitus ([facility]     Type 2 diabetes 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, with long-term current use of insulin ([facility] ) 1/23/2019       Past Surgical History:   Procedure Laterality Date    CORONARY ANGIOPLASTY WITH STENT PLACEMENT      IR IMAGE GUIDED BIOPSY/ASPIRATION LIVER  1/24/2019    IR TUBE PLACEMENT ROQUE  9/6/2019       Family History   Problem Relation Age of Onset    Cancer Mother     Hypertension Father     Cancer Brother     Cancer Maternal Grandmother     Cancer Paternal Aunt         Social History     Socioeconomic History    Marital status:       Spouse name: Not on file    Number of children: Not on file    Years of education: Not on file    Highest education level: Not on file   Occupational History    Not on file   Social Needs    Financial resource strain: Not on file    Food insecurity:     Worry: Not on file     Inability: Not on file    Transportation needs:     Medical: Not on file     Non-medical: Not on file   Tobacco Use    Smoking status: Never Smoker    Smokeless tobacco: Never Used   Substance and Sexual Activity    Alcohol use: Never     Frequency: Never    Drug use: Never    Sexual activity: Never   Lifestyle    Physical activity:     Days per week: Not on file     Minutes per session: Not on file    Stress: Not on file   Relationships    Social connections:     Talks on phone: Not on file     Gets together: Not on file     Attends [employer] service: Not on file     Active member of club or organization: Not on file     Attends meetings of clubs or organizations: Not on file     Relationship status: Not on file    Intimate partner violence:     Fear of current or ex partner: Not on file     Emotionally abused: Not on file     Physically abused: Not on file     Forced sexual activity: Not on file   Other Topics Concern    Not on file   Social History Narrative    Not on file       Review of Systems   All other systems reviewed and are negative  Vital Signs  Vitals:    12/10/19 1530 12/10/19 1630 12/10/19 1649 12/10/19 1740   BP: 142/66 160/77 157/70 164/78   TempSrc:       Pulse: 75 78 74 87   Resp: 18 18 16 16   Patient Position - Orthostatic VS: Lying Lying  Lying   Temp:           Physical Exam   Constitutional: He appears well-developed and well-nourished  HENT:   Head: Normocephalic and atraumatic  Eyes: Conjunctivae and EOM are normal    Neck: Normal range of motion  Neck supple  Cardiovascular: Normal rate and regular rhythm  Pulmonary/Chest: Effort normal and breath sounds normal    Right lateral chest wall biliary drain in place, no signs of infection   Abdominal: Soft   There is no tenderness  Musculoskeletal: Normal range of motion  He exhibits no deformity  Neurological: He is alert  No focal deficit   Skin: Skin is warm and dry  Psychiatric: He has a normal mood and affect  His behavior is normal    Nursing note and vitals reviewed         ED Medications  Medications   fentanyl citrate (PF) 100 MCG/2ML 70 mcg (has no administration in time range)   iohexol (OMNIPAQUE) 300 mg/mL injection 50 mL (40 mL Intravenous Given 12/10/19 1702)       Labs  Labs Reviewed   CBC AND DIFFERENTIAL - Abnormal       Result Value Ref Range Status    WBC 10 50  4 80 - 10 80 Thousand/uL Final    RBC 4 02 (*) 4 30 - 5 90 Million/uL Final    Hemoglobin 12 0 (*) 14 0 - 18 0 g/dL Final    Hematocrit 35 6 (*) 42 0 - 47 0 % Final    MCV 89  81 - 99 fL Final    MCH 29 9  26 0 - 34 0 pg Final    MCHC 33 8  31 0 - 37 0 g/dL Final    RDW 15 4 (*) 11 5 - 14 5 % Final    MPV 8 8  8 6 - 11 7 fL Final    Platelets 529  227 - 390 Thousands/uL Final    Neutrophils Relative 76 (*) 42 - 75 % Final    Lymphocytes Relative 13 (*) 21 - 51 % Final    Monocytes Relative 9  2 - 12 % Final    Eosinophils Relative 1  0 - 5 % Final    Basophils Relative 1  0 - 2 % Final    Neutrophils Absolute 8 00 (*) 1 40 - 6 50 Thousands/µL Final    Lymphocytes Absolute 1 30  0 60 - 4 47 Thousands/µL Final    Monocytes Absolute 0 90  0 17 - 1 22 Thousand/µL Final    Eosinophils Absolute 0 10  0 00 - 0 61 Thousand/µL Final    Basophils Absolute 0 10  0 00 - 0 10 Thousands/µL Final   COMPREHENSIVE METABOLIC PANEL - Abnormal    Sodium 138  134 - 143 mmol/L Final    Potassium 4 1  3 5 - 5 5 mmol/L Final    Chloride 105  98 - 107 mmol/L Final    CO2 21  21 - 31 mmol/L Final    ANION GAP 12  4 - 13 mmol/L Final    BUN 32 (*) 7 - 25 mg/dL Final    Creatinine 2 30 (*) 0 70 - 1 30 mg/dL Final    Comment: Standardized to IDMS reference method    Glucose 124 (*) 65 - 99 mg/dL Final    Comment:   If the patient is fasting, the ADA then defines impaired fasting glucose as > 100 mg/dL and diabetes as > or equal to 123 mg/dL  Specimen collection should occur prior to Sulfasalazine administration due to the potential for falsely depressed results  Specimen collection should occur prior to Sulfapyridine administration due to the potential for falsely elevated results  Calcium 8 9  8 6 - 10 5 mg/dL Final    AST 16  13 - 39 U/L Final    Comment:   Specimen collection should occur prior to Sulfasalazine administration due to the potential for falsely depressed results  ALT 21  7 - 52 U/L Final    Comment:   Specimen collection should occur prior to Sulfasalazine administration due to the potential for falsely depressed results  Alkaline Phosphatase 124  55 - 165 U/L Final    Total Protein 7 2  6 4 - 8 9 g/dL Final    Albumin 4 1  3 5 - 5 7 g/dL Final    Total Bilirubin 0 40  0 20 - 1 00 mg/dL Final    eGFR 26  ml/min/1 73sq m Final    Narrative:     [place] guidelines for Chronic Kidney Disease (CKD):     Stage 1 with normal or high GFR (GFR > 90 mL/min/1 73 square meters)    Stage 2 Mild CKD (GFR = 60-89 mL/min/1 73 square meters)    Stage 3A Moderate CKD (GFR = 45-59 mL/min/1 73 square meters)    Stage 3B Moderate CKD (GFR = 30-44 mL/min/1 73 square meters)    Stage 4 Severe CKD (GFR = 15-29 mL/min/1 73 square meters)    Stage 5 End Stage CKD (GFR <15 mL/min/1 73 square meters)  Note: GFR calculation is accurate only with a steady state creatinine   TROPONIN I - Normal    Troponin I <0 03  <=0 03 ng/mL Final        ECG  Sinus rhythm with LAD, LVH with repolarization abnormality and no acute ischemic changes       Imaging  XR chest 2 views   ED Interpretation by Gregg Hutchison DO (12/10 8309)   Right middle lobe linear consolidation versus atelectasis, favor atelectasis      CT abdomen pelvis wo contrast    (Results Pending)   IR tube change    (Results Pending)   Preliminary read by radiologist reports no acute intra-abdominal pelvic process to explain the patient's symptoms otherwise    Procedures   None  ED Course        Disposition  Time reflects when diagnosis was documented in both MDM as applicable and the Disposition within this note     Time User Action Codes Description Comment    12/10/2019  4:09 PM Cathy Blas Add [R06 00] Dyspnea, unspecified type       ED Disposition     ED Disposition Condition Date/Time Comment    Admit González Mcgarry Dec 10, 2019  6:20 PM Case was discussed with hospitalist and the patient's admission status was agreed to be Admission Status: observation status to the service of Dr Ricky Zaragoza           Follow-up Information     Follow up With Specialties Details Why [address] [place],  Nephrology Call today To schedule an appointment for re-evaluation 2018 [address], [place] [address] [phone]             ED Provider  Electronically signed by:     Araceli Donovan, DO  12/10/19 [address] , DO  12/10/19 [address] , DO  12/10/19 [address] , DO  12/10/19 1842

## 2019-12-10 NOTE — ASSESSMENT & PLAN NOTE
· Baseline Cr 2 3-2 5 mg/dL   · Currently at baseline   · Will continue to monitor renal function closely  · Avoid nephrotoxins

## 2019-12-11 PROBLEM — Z71.89 GOALS OF CARE, COUNSELING/DISCUSSION: Status: ACTIVE | Noted: 2019-12-11

## 2019-12-11 LAB
ALBUMIN SERPL BCP-MCNC: 3.3 G/DL (ref 3.5–5.7)
ALP SERPL-CCNC: 100 U/L (ref 55–165)
ALT SERPL W P-5'-P-CCNC: 15 U/L (ref 7–52)
ANION GAP SERPL CALCULATED.3IONS-SCNC: 10 MMOL/L (ref 4–13)
AST SERPL W P-5'-P-CCNC: 11 U/L (ref 13–39)
BASOPHILS # BLD AUTO: 0.1 THOUSANDS/ΜL (ref 0–0.1)
BASOPHILS NFR BLD AUTO: 1 % (ref 0–2)
BILIRUB SERPL-MCNC: 0.5 MG/DL (ref 0.2–1)
BUN SERPL-MCNC: 33 MG/DL (ref 7–25)
CALCIUM SERPL-MCNC: 8.5 MG/DL (ref 8.6–10.5)
CHLORIDE SERPL-SCNC: 105 MMOL/L (ref 98–107)
CO2 SERPL-SCNC: 23 MMOL/L (ref 21–31)
CREAT SERPL-MCNC: 2.34 MG/DL (ref 0.7–1.3)
EOSINOPHIL # BLD AUTO: 0 THOUSAND/ΜL (ref 0–0.61)
EOSINOPHIL NFR BLD AUTO: 0 % (ref 0–5)
ERYTHROCYTE [DISTWIDTH] IN BLOOD BY AUTOMATED COUNT: 14.7 % (ref 11.5–14.5)
GFR SERPL CREATININE-BSD FRML MDRD: 25 ML/MIN/1.73SQ M
GLUCOSE SERPL-MCNC: 110 MG/DL (ref 65–140)
GLUCOSE SERPL-MCNC: 139 MG/DL (ref 65–99)
GLUCOSE SERPL-MCNC: 228 MG/DL (ref 65–140)
GLUCOSE SERPL-MCNC: 247 MG/DL (ref 65–140)
GLUCOSE SERPL-MCNC: 250 MG/DL (ref 65–140)
HCT VFR BLD AUTO: 30.4 % (ref 42–47)
HGB BLD-MCNC: 10.3 G/DL (ref 14–18)
LYMPHOCYTES # BLD AUTO: 0.9 THOUSANDS/ΜL (ref 0.6–4.47)
LYMPHOCYTES NFR BLD AUTO: 10 % (ref 21–51)
MAGNESIUM SERPL-MCNC: 1.5 MG/DL (ref 1.9–2.7)
MCH RBC QN AUTO: 29.7 PG (ref 26–34)
MCHC RBC AUTO-ENTMCNC: 33.9 G/DL (ref 31–37)
MCV RBC AUTO: 88 FL (ref 81–99)
MONOCYTES # BLD AUTO: 1 THOUSAND/ΜL (ref 0.17–1.22)
MONOCYTES NFR BLD AUTO: 11 % (ref 2–12)
NEUTROPHILS # BLD AUTO: 6.9 THOUSANDS/ΜL (ref 1.4–6.5)
NEUTS SEG NFR BLD AUTO: 78 % (ref 42–75)
PLATELET # BLD AUTO: 169 THOUSANDS/UL (ref 149–390)
PMV BLD AUTO: 9.1 FL (ref 8.6–11.7)
POTASSIUM SERPL-SCNC: 3.8 MMOL/L (ref 3.5–5.5)
PROT SERPL-MCNC: 5.9 G/DL (ref 6.4–8.9)
RBC # BLD AUTO: 3.47 MILLION/UL (ref 4.3–5.9)
SODIUM SERPL-SCNC: 138 MMOL/L (ref 134–143)
WBC # BLD AUTO: 8.9 THOUSAND/UL (ref 4.8–10.8)

## 2019-12-11 PROCEDURE — 97167 OT EVAL HIGH COMPLEX 60 MIN: CPT

## 2019-12-11 PROCEDURE — 97163 PT EVAL HIGH COMPLEX 45 MIN: CPT

## 2019-12-11 PROCEDURE — 97530 THERAPEUTIC ACTIVITIES: CPT

## 2019-12-11 PROCEDURE — G8987 SELF CARE CURRENT STATUS: HCPCS

## 2019-12-11 PROCEDURE — 83735 ASSAY OF MAGNESIUM: CPT | Performed by: NURSE PRACTITIONER

## 2019-12-11 PROCEDURE — 82948 REAGENT STRIP/BLOOD GLUCOSE: CPT

## 2019-12-11 PROCEDURE — 99232 SBSQ HOSP IP/OBS MODERATE 35: CPT | Performed by: HOSPITALIST

## 2019-12-11 PROCEDURE — 80053 COMPREHEN METABOLIC PANEL: CPT | Performed by: NURSE PRACTITIONER

## 2019-12-11 PROCEDURE — G8978 MOBILITY CURRENT STATUS: HCPCS

## 2019-12-11 PROCEDURE — 85025 COMPLETE CBC W/AUTO DIFF WBC: CPT | Performed by: NURSE PRACTITIONER

## 2019-12-11 PROCEDURE — G8988 SELF CARE GOAL STATUS: HCPCS

## 2019-12-11 PROCEDURE — 97535 SELF CARE MNGMENT TRAINING: CPT

## 2019-12-11 PROCEDURE — G8979 MOBILITY GOAL STATUS: HCPCS

## 2019-12-11 RX ADMIN — ASPIRIN 81 MG 81 MG: 81 TABLET ORAL at 09:04

## 2019-12-11 RX ADMIN — TAMSULOSIN HYDROCHLORIDE 0.4 MG: 0.4 CAPSULE ORAL at 16:32

## 2019-12-11 RX ADMIN — VITAMIN D, TAB 1000IU (100/BT) 1000 UNITS: 25 TAB at 08:58

## 2019-12-11 RX ADMIN — HEPARIN SODIUM 5000 UNITS: 5000 INJECTION, SOLUTION INTRAVENOUS; SUBCUTANEOUS at 21:53

## 2019-12-11 RX ADMIN — METHENAMINE HIPPURATE 1 G: 1 TABLET ORAL at 16:32

## 2019-12-11 RX ADMIN — PANTOPRAZOLE SODIUM 40 MG: 40 TABLET, DELAYED RELEASE ORAL at 06:01

## 2019-12-11 RX ADMIN — INSULIN LISPRO 2 UNITS: 100 INJECTION, SOLUTION INTRAVENOUS; SUBCUTANEOUS at 21:53

## 2019-12-11 RX ADMIN — METHENAMINE HIPPURATE 1 G: 1 TABLET ORAL at 09:04

## 2019-12-11 RX ADMIN — AMLODIPINE BESYLATE 10 MG: 5 TABLET ORAL at 08:58

## 2019-12-11 RX ADMIN — HEPARIN SODIUM 5000 UNITS: 5000 INJECTION, SOLUTION INTRAVENOUS; SUBCUTANEOUS at 14:40

## 2019-12-11 RX ADMIN — METHENAMINE HIPPURATE 1 G: 1 TABLET ORAL at 21:53

## 2019-12-11 RX ADMIN — COLCHICINE 0.6 MG: 0.6 TABLET, FILM COATED ORAL at 09:03

## 2019-12-11 RX ADMIN — SODIUM BICARBONATE 650 MG: 650 TABLET ORAL at 18:21

## 2019-12-11 RX ADMIN — HEPARIN SODIUM 5000 UNITS: 5000 INJECTION, SOLUTION INTRAVENOUS; SUBCUTANEOUS at 06:01

## 2019-12-11 RX ADMIN — INSULIN DETEMIR 10 UNITS: 100 INJECTION, SOLUTION SUBCUTANEOUS at 21:53

## 2019-12-11 RX ADMIN — HYDROMORPHONE HYDROCHLORIDE 0.5 MG: 1 INJECTION, SOLUTION INTRAMUSCULAR; INTRAVENOUS; SUBCUTANEOUS at 21:52

## 2019-12-11 RX ADMIN — SODIUM BICARBONATE 650 MG: 650 TABLET ORAL at 08:58

## 2019-12-11 RX ADMIN — INSULIN LISPRO 2 UNITS: 100 INJECTION, SOLUTION INTRAVENOUS; SUBCUTANEOUS at 16:33

## 2019-12-11 RX ADMIN — Medication 1 CAPSULE: at 16:32

## 2019-12-11 RX ADMIN — INSULIN LISPRO 2 UNITS: 100 INJECTION, SOLUTION INTRAVENOUS; SUBCUTANEOUS at 11:39

## 2019-12-11 NOTE — ASSESSMENT & PLAN NOTE
· Resolved  · Unspecified exact etiology  · Possibly related to pain and/or anxiety  · Patient is currently on room air   · CXR shows no acute infiltrates  · D-dimer minimally elevated, doubt that this is PE related, hold off on further workup since the patient is open to the idea of getting a hospice evaluation  · Oxygen supplement as needed   · Pain control

## 2019-12-11 NOTE — PLAN OF CARE
Problem: DISCHARGE PLANNING - CARE MANAGEMENT  Goal: Discharge to post-acute care or home with appropriate resources  Description  INTERVENTIONS:  - Conduct assessment to determine patient/family and health care team treatment goals, and need for post-acute services based on payer coverage, community resources, and patient preferences, and barriers to discharge  - Address psychosocial, clinical, and financial barriers to discharge as identified in assessment in conjunction with the patient/family and health care team  - Arrange appropriate level of post-acute services according to patient's   needs and preference and payer coverage in collaboration with the physician and health care team  - Communicate with and update the patient/family, physician, and health care team regarding progress on the discharge plan  - Arrange appropriate transportation to post-acute venues  Pt will be having an [facility] with [facility]     Outcome: Progressing

## 2019-12-11 NOTE — ASSESSMENT & PLAN NOTE
· With percutaneous cholecystostomy tube  · The patient is not a candidate for cholecystectomy   · IR tube check/tube exchange done yesterday  · No apparent occlusion or residual contrast fluid in the gallbladder; contrast did not produce colic

## 2019-12-11 NOTE — UTILIZATION REVIEW
Initial Clinical Review    Admission: Date/Time/Statement:    Observation 12/10/19 @ 1821, converted to inpatient admission 12/11/19 @ 1428 for continued care & tx for SOB  Per MD:  Percutaneous cholecystectomy tube/drain exchange done by IR yesterday, no occlusion or residual contrast in gallbladder  Persistent RUQ pain with periodic spasms, controlled on current med regime  Admitting Physician Doni Desai    Level of Care Med Surg    Estimated length of stay More than 2 Midnights    Certification I certify that inpatient services are medically necessary for this patient for a duration of greater than two midnights  See H&P and MD Progress Notes for additional information about the patient's course of treatment            Order History   Inpatient   Date/Time Action Taken User Additional Information   12/11/19 1428 Release Austin Covington MD (auto-released) From Order: [id number]   12/11/19 1428 Complete Austin Covington MD      ED Arrival Information     Expected Arrival [address] of Arrival Escorted By Service Admission Type    12/10/2019  12/10/2019 11:51 Urgent Wheelchair Friend General Medicine Urgent    Arrival Complaint    shortness of breath catheter problem        Chief Complaint   Patient presents with    Shortness of Breath     According to the patient, he has felt SOB and for a couple days following ambulation  Patient also report problems with bilary tube  Assessment/Plan:   Complicated PMH significant for HTN, hyperlipidemia with PSH significant for biliary drain complains of gradual onset difficulty breathing due to dull achy constant right upper quadrant lower chest pain and discomfort associated with taking deep breaths over the past 3 weeks since placement of biliary drain      ED Triage Vitals [12/10/19 1201]   Temperature Pulse Respirations Blood Pressure SpO2   98 1 °F (36 7 °C) 78 18 152/73 98 %      Temp Source Heart Rate Source Patient Position - Orthostatic VS BP Location FiO2 (%)   Temporal Monitor Lying Left arm --      Pain Score       8        Wt Readings from Last 1 Encounters:   12/11/19 70 3 kg (155 lb)     Additional Vital Signs:   Date/Time  Temp  Pulse  Resp  BP  SpO2  O2 Device  Patient Position - Orthostatic VS   12/11/19 0718  98 7 °F (37 1 °C)  71  16  126/59  96 %  None (Room air)  Lying   12/10/19 2225  98 7 °F (37 1 °C)  81  18  117/56  96 %  None (Room air)  --   12/10/19 1924  98 5 °F (36 9 °C)  103  20  185/78  Abnormal   96 %  None (Room air)  --   12/10/19 1900  --  90  16  189/88  Abnormal   93 %  --  --   12/10/19 1845  --  87  16  173/84  Abnormal   96 %  None (Room air)  --   Pertinent Labs/Diagnostic Test Results:   Results from last 7 days   Lab Units 12/11/19  0438 12/10/19  1222 12/07/19  0944   WBC Thousand/uL 8 90 10 50 11 60*   HEMOGLOBIN g/dL 10 3* 12 0* 11 9*   HEMATOCRIT % 30 4* 35 6* 35 1*   PLATELETS Thousands/uL 169 190 209   NEUTROS ABS Thousands/µL 6 90* 8 00* 8 40*     Results from last 7 days   Lab Units 12/11/19  0438 12/10/19  1222 12/07/19  0944   SODIUM mmol/L 138 138 139   POTASSIUM mmol/L 3 8 4 1 3 7   CHLORIDE mmol/L 105 105 106   CO2 mmol/L 23 21 24   ANION GAP mmol/L 10 12 9   BUN mg/dL 33* 32* 27*   CREATININE mg/dL 2 34* 2 30* 2 34*   EGFR ml/min/1 73sq m 25 26 25   CALCIUM mg/dL 8 5* 8 9 8 9   MAGNESIUM mg/dL 1 5*  --  1 6*     Results from last 7 days   Lab Units 12/11/19  0438 12/10/19  1222 12/07/19  0944   AST U/L 11* 16 20   ALT U/L 15 21 26   ALK PHOS U/L 100 124 126   TOTAL PROTEIN g/dL 5 9* 7 2 7 2   ALBUMIN g/dL 3 3* 4 1 4 1   TOTAL BILIRUBIN mg/dL 0 50 0 40 0 60     Results from last 7 days   Lab Units 12/11/19  0608 12/10/19  2031   POC GLUCOSE mg/dl 110 119     Results from last 7 days   Lab Units 12/11/19  0438 12/10/19  1222   GLUCOSE RANDOM mg/dL 139* 124*     BETA-HYDROXYBUTYRATE   Date Value Ref Range Status   02/05/2019 0 11 0 02 - 0 27 mmol/L Final      Results from last 7 days   Lab Units 12/10/19  1221   TROPONIN I ng/mL <0 03     Results from last 7 days   Lab Units 12/10/19  2020   D-DIMER QUANTITATIVE ug/ml FEU 0 95*     12/10  cxr=No focal consolidation  Ct a/p=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  Limited evaluation of the hepatic parenchyma due to lack of contrast   Subtle low density lesions are seen consistent with hepatic metastasis  There are small mesenteric and para-aortic nodes identified which are unchanged in size and configuration  Small nodes in the right epicardial fat seen on series 2 images 13 through 15 appears slightly larger    ED Treatment:   Medication Administration from 12/10/2019 1129 to 12/10/2019 1932       Date/Time Order Dose Route     12/10/2019 1702 iohexol (OMNIPAQUE) 300 mg/mL injection 50 mL 40 mL Intravenous     12/10/2019 1850 fentanyl citrate (PF) 100 MCG/2ML 70 mcg 70 mcg Intravenous        Past Medical History:   Diagnosis Date    Acute pancreatitis without infection or necrosis 9/26/2019    Anemia of chronic renal failure     BPH (benign prostatic hyperplasia)     Cancer (HCC)     liver cancer    Cardiac disease     Chronic kidney disease, stage 3 (HCC)     Coronary artery disease     Diabetes mellitus ([address] )     DJD (degenerative joint disease)     Essential hypertension     Gait disturbance     Generalized weakness     GERD (gastroesophageal reflux disease)     Gout     History of transfusion     Hydronephrosis     Hyperlipidemia     Hypertension     Liver cancer ([address] )     Pressure injury of skin     Proteinuria     Renal disorder     Retention of urine     Thrombophlebitis migrans     Type 2 diabetes mellitus (HCC)     Type 2 diabetes 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, with long-term current use of insulin ([address] ) 1/23/2019     Present on Admission:   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 ([address] )   Moderate protein-calorie malnutrition ([address] )   Essential hypertension   CKD (chronic kidney disease) stage 3, GFR 30-59 ml/min (HCC)   Biliary sludge  Admitting Diagnosis: Shortness of breath [R06 02]  Dyspnea, unspecified type [R06 00]  Age/Sex: [de-identified] y o  male  Admission Orders:  Med surg  accuchecks with coverage scale  Pt eval & Tx  venodynes  Scheduled Medications:  amLODIPine 10 mg Oral Daily   aspirin 81 mg Oral Daily   b complex-vitamin C-folic acid 1 capsule Oral Daily With Dinner   cholecalciferol 1,000 Units Oral Daily   colchicine 0 6 mg Oral Daily   heparin (porcine) 5,000 Units Subcutaneous Q8H [address]   insulin detemir 10 Units Subcutaneous HS   insulin lispro 1-5 Units Subcutaneous TID AC   insulin lispro 1-5 Units Subcutaneous HS   methenamine hippurate 1 g Oral TID   pantoprazole 40 mg Oral Early Morning   sodium bicarbonate 650 mg Oral BID after meals   tamsulosin 0 4 mg Oral Daily With Dinner   PRN Meds:  acetaminophen 325 mg Oral PRN   HYDROmorphone 0 5 mg Intravenous Q3H PRN   ondansetron 4 mg Intravenous Q6H PRN   oxyCODONE-acetaminophen 1 tablet Oral Q4H PRN   simethicone 80 mg Oral Q6H PRN     Network Utilization Review Department  [email or web address]  ATTENTION: Please call with any questions or concerns to [phone] and carefully listen to the prompts so that you are directed to the right person  All voicemails are confidential   Krystle Steve all requests for admission clinical reviews, approved or denied determinations and any other requests to dedicated fax number below belonging to the campus where the patient is receiving treatment   List of dedicated fax numbers for the Facilities:  FACILITY NAME UR FAX NUMBER   ADMISSION DENIALS (Administrative/Medical Necessity) [phone]   [address] (Maternity/NICU/Pediatrics) [phone]   Elsy Warner [phone]   Jai Polanco [phone]   [place] [phone]   Jasvir Fuchs   [address] [phone]   Augustina Lee [phone]   [address] [phone]   [address] [place] [address] [phone]

## 2019-12-11 NOTE — PLAN OF CARE
Problem: OCCUPATIONAL THERAPY ADULT  Goal: Performs self-care activities at highest level of function for planned discharge setting  See evaluation for individualized goals  Description  Treatment Interventions: ADL retraining, Functional transfer training, Endurance training, Patient/family training, Activityengagement          See flowsheet documentation for full assessment, interventions and recommendations  Note:   Limitation: Decreased ADL status, Decreased Safe judgement during ADL, Decreased endurance, Decreased self-care trans  Prognosis: Good  Assessment: Pt is a [de-identified] y o  male seen for OT evaluation s/p admit to [facility] on 12/10/2019 w/ Shortness of breath  Comorbidities affecting pt's functional performance at time of assessment include: DM, HTN, cancer history and CKD, Anemia, See H & P for PMHx  Personal factors affecting pt at time of IE include:steps to enter environment and difficulty performing ADLS  Prior to admission, pt was essentially I with self care ADL's and MI with mobility/toileting, with supervision/A of S O  As needed  D for most IADL's  Upon evaluation: Pt requires an increased level of assistance with self care ADL's, functional transfers/mobility/toileting r/t  the following deficits impacting occupational performance: weakness, decreased balance, decreased tolerance and decreased safety awareness  Pt to benefit from continued skilled OT tx while in the hospital to address deficits as defined above and maximize level of functional independence w ADL's and functional mobility  Occupational Performance areas to address include: bathing/shower, toilet hygiene, dressing and functional mobility  From OT standpoint, recommendation at time of d/c would be STR vs home with SO and services, depending upon progress during course of hospitalization         OT Discharge Recommendation: Short Term Rehab  OT - OK to Discharge: No

## 2019-12-11 NOTE — ASSESSMENT & PLAN NOTE
· I had a long discussion with the patient, his wife Sebastian Lucía, and then separately with his Hematology oncologist Dr Naina Oliver over the phone  · The patient and his wife state that they are tired, and went on to state that they were recently told that he is no longer candidate for some form of goal directed radiation therapy into the liver  I spoke to them in great depth in regards to quality versus quantity of life  At this time they are open for a hospice evaluation  Hospice have been consulted    Dr Naina Oliver his primary Hematology oncologist is in agreement with this plan  · Patient to remain in-house until hospice evaluates the patient

## 2019-12-11 NOTE — ASSESSMENT & PLAN NOTE
· Follows with Dr Naina Oliver hem/onc OP   · He was found not to be a candidate for the [facility] [place] [facility] treatment   · Currently on Lanreotide on a monthly basis   · Supportive care while in-house  · If the patient chooses not to sign on with hospice, he will follow up with Heme-Onc as an outpatient

## 2019-12-11 NOTE — OCCUPATIONAL THERAPY NOTE
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)    Moderate protein-calorie malnutrition (HCC)    Biliary sludge    CKD (chronic kidney disease) stage 3, GFR 30-59 ml/min (HCC)      Past Medical History:   Diagnosis Date    Acute pancreatitis without infection or necrosis 9/26/2019    Anemia of chronic renal failure     BPH (benign prostatic hyperplasia)     Cancer (HCC)     liver cancer    Cardiac disease     [facility], [facility]     Coronary artery disease     Diabetes mellitus ([facility] )     DJD (degenerative joint disease)     Essential hypertension     Gait disturbance     Generalized weakness     GERD (gastroesophageal reflux disease)     Gout     History of transfusion     Hydronephrosis     Hyperlipidemia     Hypertension     Liver cancer ([facility] )     Pressure injury of skin     Proteinuria     Renal disorder     Retention of urine     Thrombophlebitis migrans     Type 2 diabetes mellitus ([facility] )     Type 2 diabetes 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, with long-term current use of insulin ([facility] ) 1/23/2019       Past Surgical History:   Procedure Laterality Date    CORONARY ANGIOPLASTY WITH STENT PLACEMENT      IR IMAGE GUIDED BIOPSY/ASPIRATION LIVER  1/24/2019    IR TUBE PLACEMENT ROQUE  9/6/2019 12/11/19 0912   Note Type   Note type Eval/Treat   Restrictions/Precautions   Weight Bearing Precautions Per Order No   Other Precautions Contact/isolation; Fall Risk   Pain Assessment   Pain Assessment No/denies pain   Pain Score No Pain   Home Living   Type of [address] [place] [address] One level; Able to live on main level with bedroom/bathroom; Performs ADLs on one level;Stairs to enter with rails  (2 BETH)   Bathroom Shower/Tub Tub/shower unit   Bathroom Toilet Standard   Bathroom Equipment Shower chair   [facility]   Additional Comments SO supervises pt with walker   Prior Function   Level of [place] Independent with ADLs and functional mobility   Lives With Significant other   Receives Help From Family   ADL Assistance Independent   IADLs Needs assistance   Falls in the last 6 months 1 to 4   Vocational Retired   Comments SO assists with feet/back PRN/supervises, pt bathes at [facility]  (WDL) WDL  (s/w flattened affect, cooperative)   Subjective   Subjective Pt agreeable to therapy eval, expressed need to use the bathroom   ADL   Eating Assistance 5  Supervision/Setup   Eating Deficit Setup   Grooming Assistance 4  Minimal Assistance   UB Bathing Assistance 3  Moderate Assistance   LB Bathing Assistance 2  Maximal Assistance   [address] 3  Moderate Assistance   LB Dressing Assistance Unable to assess   Toileting Assistance  3  Moderate Assistance   Toileting Deficit Perineal hygiene;Grab bar use   Bed Mobility   Supine to Sit 3  Moderate assistance   Additional items Assist x 2; Increased time required;Verbal cues;LE management   Sit to Supine 3  Moderate assistance   Additional items Assist x 2; Increased time required;Verbal cues;LE management   Transfers   Sit to Stand 3  Moderate assistance   Additional items Assist x 2; Increased time required;Verbal cues   Stand to Sit 3  Moderate assistance   Additional items Assist x 2; Increased time required;Verbal cues   Toilet transfer 3  Moderate assistance   Additional items Assist x 2; Increased time required;Standard toilet  (grab bars)   Functional Mobility   Functional Mobility 6  Modified independent   Additional Comments X2   Additional items Rolling walker   Balance   Static Sitting Fair   Dynamic Sitting Fair   Static Standing Fair -   Dynamic Standing Fair - Ambulatory Poor +   Activity Tolerance   Activity Tolerance Patient tolerated treatment well   Nurse Made Aware yes   RUE Assessment   RUE Assessment WFL   LUE Assessment   LUE Assessment WFL   Hand Function   Gross Motor Coordination Functional   Fine Motor Coordination Functional   Cognition   Overall Cognitive Status Impaired   Arousal/Participation Alert; Cooperative   Attention Within functional limits   Orientation Level Oriented X4   Memory Within functional limits   Following Commands Follows one step commands without difficulty   Assessment   Limitation Decreased ADL status; Decreased Safe judgement during ADL;Decreased endurance;Decreased self-care trans   Prognosis Good   Assessment Pt is a [de-identified] y o  male seen for OT evaluation s/p admit to [facility] on 12/10/2019 w/ Shortness of breath  Comorbidities affecting pt's functional performance at time of assessment include: DM, HTN, cancer history and CKD, Anemia, See H & P for PMHx  Personal factors affecting pt at time of IE include:steps to enter environment and difficulty performing ADLS  Prior to admission, pt was essentially I with self care ADL's and MI with mobility/toileting, with supervision/A of S O  As needed  D for most IADL's  Upon evaluation: Pt requires an increased level of assistance with self care ADL's, functional transfers/mobility/toileting r/t  the following deficits impacting occupational performance: weakness, decreased balance, decreased tolerance and decreased safety awareness  Pt to benefit from continued skilled OT tx while in the hospital to address deficits as defined above and maximize level of functional independence w ADL's and functional mobility  Occupational Performance areas to address include: bathing/shower, toilet hygiene, dressing and functional mobility  From OT standpoint, recommendation at time of d/c would be STR vs home with SO and services, depending upon progress during course of hospitalization       Goals   Patient Goals to feel better, get back home   STG Time Frame 3-5   Short Term Goal #1 increase UB self care ADL's to Min A (following set up)   Short Term Goal #2 increase bed mobility to Min A for self care participation   LTG Time Frame 7-10   Long Term Goal #1 increase UB self care to SBA, increase LB self care to Mod/Min A   Long Term Goal #2 increase self toileting to Min A X 1, withj good safety demonstrated   Long Term Goal increase bed mobility to MI   Plan   Treatment Interventions ADL retraining;Functional transfer training; Endurance training;Patient/family training; Activityengagement   Goal Expiration Date 12/11/19   OT Treatment Day 0   OT Frequency 3-5x/wk   Additional Treatment Session   Start Time 5739   End Time 1264   Treatment Assessment toileting/functional transfers/mobility to/from bathroom, janneth care   Recommendation   OT Discharge Recommendation Short Term Rehab   OT - OK to Discharge No   Barthel Index   Feeding 5   Bathing 0   Grooming Score 0  (set up)   Dressing Score 5   Bladder Score 0  (catheter)   Bowels Score 10   Toilet Use Score 5   Transfers (Bed/Chair) Score 5   Mobility (Level Surface) Score 0   Stairs Score 0   Barthel Index Score 30   Catherine Parent, OT

## 2019-12-11 NOTE — PLAN OF CARE
Problem: Potential for Falls  Goal: Patient will remain free of falls  Description  INTERVENTIONS:  - Assess patient frequently for physical needs  -  Identify cognitive and physical deficits and behaviors that affect risk of falls    -  [place] fall precautions as indicated by assessment   - Educate patient/family on patient safety including physical limitations  - Instruct patient to call for assistance with activity based on assessment  - Modify environment to reduce risk of injury  - Consider OT/PT consult to assist with strengthening/mobility  Outcome: Progressing     Problem: PAIN - ADULT  Goal: Verbalizes/displays adequate comfort level or baseline comfort level  Description  Interventions:  - Encourage patient to monitor pain and request assistance  - Assess pain using appropriate pain scale  - Administer analgesics based on type and severity of pain and evaluate response  - Implement non-pharmacological measures as appropriate and evaluate response  - Consider cultural and social influences on pain and pain management  - Notify physician/advanced practitioner if interventions unsuccessful or patient reports new pain  Outcome: Progressing     Problem: INFECTION - ADULT  Goal: Absence or prevention of progression during hospitalization  Description  INTERVENTIONS:  - Assess and monitor for signs and symptoms of infection  - Monitor lab/diagnostic results  - Monitor all insertion sites, i e  indwelling lines, tubes, and drains  - Administer medications as ordered  - Instruct and encourage patient and family to use good hand hygiene technique  - Identify and instruct in appropriate isolation precautions for identified infection/condition   Outcome: Progressing  Goal: Absence of fever/infection during neutropenic period  Description  INTERVENTIONS:  - Monitor WBC    Outcome: Progressing     Problem: SAFETY ADULT  Goal: Patient will remain free of falls  Description  INTERVENTIONS:  - Assess patient frequently for physical needs  -  Identify cognitive and physical deficits and behaviors that affect risk of falls    -  [place] fall precautions as indicated by assessment   - Educate patient/family on patient safety including physical limitations  - Instruct patient to call for assistance with activity based on assessment  - Modify environment to reduce risk of injury  - Consider OT/PT consult to assist with strengthening/mobility  Outcome: Progressing  Goal: Maintain or return to baseline ADL function  Description  INTERVENTIONS:  -  Assess patient's ability to carry out ADLs; assess patient's baseline for ADL function and identify physical deficits which impact ability to perform ADLs (bathing, care of mouth/teeth, toileting, grooming, dressing, etc )  - Assess/evaluate cause of self-care deficits   - Assess range of motion  - Assess patient's mobility; develop plan if impaired  - Assess patient's need for assistive devices and provide as appropriate  - Encourage maximum independence but intervene and supervise when necessary  - Involve family in performance of ADLs  - Assess for home care needs following discharge   - Consider OT consult to assist with ADL evaluation and planning for discharge  - Provide patient education as appropriate  Outcome: Progressing  Goal: Maintain or return mobility status to optimal level  Description  INTERVENTIONS:  - Assess patient's baseline mobility status (ambulation, transfers, stairs, etc )    - Identify cognitive and physical deficits and behaviors that affect mobility  - Identify mobility aids required to assist with transfers and/or ambulation (gait belt, sit-to-stand, lift, walker, cane, etc )  - [place] fall precautions as indicated by assessment  - Record patient progress and toleration of activity level on Mobility SBAR; progress patient to next Phase/Stage  - Instruct patient to call for assistance with activity based on assessment  - Consider rehabilitation consult to assist with strengthening/weightbearing, etc   Outcome: Progressing     Problem: DISCHARGE PLANNING  Goal: Discharge to home or other facility with appropriate resources  Description  INTERVENTIONS:  - Identify barriers to discharge w/patient and caregiver  - Arrange for needed discharge resources and transportation as appropriate  - Identify discharge learning needs (meds, wound care, etc )  - Refer to Case Management Department for coordinating discharge planning if the patient needs post-hospital services based on physician/advanced practitioner order or complex needs related to functional status, cognitive ability, or social support system   Outcome: Progressing     Problem: GASTROINTESTINAL - ADULT  Goal: Minimal or absence of nausea and/or vomiting  Description  INTERVENTIONS:  - Administer IV fluids if ordered to ensure adequate hydration  - Provide nonpharmacologic comfort measures as appropriate     Outcome: Progressing  Goal: Maintains or returns to baseline bowel function  Description  INTERVENTIONS:  - Assess bowel function  - Encourage oral fluids to ensure adequate hydration  - Administer IV fluids if ordered to ensure adequate hydration  - Administer ordered medications as needed  - Encourage mobilization and activity  - Consider nutritional services referral to assist patient with adequate nutrition and appropriate food choices  Outcome: Progressing  Goal: Maintains adequate nutritional intake  Description       Outcome: Progressing

## 2019-12-11 NOTE — ASSESSMENT & PLAN NOTE
Malnutrition Findings:           BMI Findings: Body mass index is 23 57 kg/m²     · Dietitian consult   · Dietary supplement as indicated

## 2019-12-11 NOTE — PROGRESS NOTES
Progress Note - Corin Greenwood 1940, [de-identified] y o  male MRN: [id number]    Unit/Bed#: -01 Encounter: [id number]    Primary Care Provider: Layo Crandall DO   Date and time admitted to hospital: 12/10/2019 11:54 AM        * Shortness of breath  Assessment & Plan  · Resolved  · Unspecified exact etiology  · Possibly related to pain and/or anxiety  · Patient is currently on room air   · CXR shows no acute infiltrates  · D-dimer minimally elevated, doubt that this is PE related, hold off on further workup since the patient is open to the idea of getting a hospice evaluation  · Oxygen supplement as needed   · Pain control    Goals of care, counseling/discussion  Assessment & Plan  · I had a long discussion with the patient, his wife Kindra Zamarripa, and then separately with his Hematology oncologist Dr Chad Arambula over the phone  · The patient and his wife state that they are tired, and went on to state that they were recently told that he is no longer candidate for some form of goal directed radiation therapy into the liver  I spoke to them in great depth in regards to quality versus quantity of life  At this time they are open for a hospice evaluation  Hospice have been consulted    Dr Chad Arambula his primary Hematology oncologist is in agreement with this plan  · Patient to remain in-house until hospice evaluates the patient    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 [facility])  Assessment & Plan  · Follows with Dr Chad Arambula hem/onc OP   · He was found not to be a candidate for the [facility] treatment   · Currently on Lanreotide on a monthly basis   · Supportive care while in-house  · If the patient chooses not to sign on with hospice, he will follow up with Heme-Onc as an outpatient    Type 2 diabetes 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, with long-term current use of insulin [facility])  Assessment & Plan  Lab Results   Component Value Date    HGBA1C 8 1 (H) 06/14/2019       Recent Labs     12/10/19  2031 19  0608 19  1115   POCGLU 119 110 247*       Blood Sugar Average: Last 72 hrs:  · (P) 431 [id number] resume lantus   · ISS   · Continue Accu-Cheks AC and HS, continue Lantus and sliding scale insulin coverage    Biliary sludge  Assessment & Plan  · With percutaneous cholecystostomy tube  · The patient is not a candidate for cholecystectomy   · IR tube check/tube exchange done yesterday  · No apparent occlusion or residual contrast fluid in the gallbladder; contrast did not produce colic       Essential hypertension  Assessment & Plan  · BP is well-controlled   · Continue with amlodipine     Moderate protein-calorie malnutrition (HCC)  Assessment & Plan  Malnutrition Findings:           BMI Findings: Body mass index is 23 57 kg/m²  · Dietitian consult   · Dietary supplement as indicated         VTE Pharmacologic Prophylaxis: Pharmacologic: Heparin    Patient Centered Rounds: I have performed bedside rounds with nursing staff today  Discussions with Specialists or Other Care Team Provider:  Hematology-Oncology, case management, nursing and pharmacy  Education and Discussions with Family / Patient:  Patient's wife Jesusita Roger was bedside at the time of my evaluation, all questions answered to her satisfaction    Current Length of Stay: 0 day(s)    Current Patient Status: Inpatient   Certification Statement: The patient, admitted on an observation basis, will now require > 2 midnight hospital stay due to The need for a hospice evaluation    Discharge Plan: Will start discharge planning tomorrow    Code Status: Level 1 - Full Code    Subjective:   Patient seen and examined, no new complaints no distress    Patient feels that his pain for the most part is controlled, however he continues to have periodic spasms of right upper quadrant pain    Objective:     Vitals:   Temp (24hrs), Av 6 °F (37 °C), Min:98 5 °F (36 9 °C), Max:98 7 °F (37 1 °C)    Temp:  [98 5 °F (36 9 °C)-98 7 °F (37 1 °C)] 98 7 °F (37 1 °C)  HR:  [] 71  Resp:  [16-20] 16  BP: (117-189)/(56-88) 134/58  SpO2:  [93 %-99 %] 96 %  Body mass index is 23 57 kg/m²  Input and Output Summary (last 24 hours): Intake/Output Summary (Last 24 hours) at 12/11/2019 1445  Last data filed at 12/11/2019 1437  Gross per 24 hour   Intake --   Output 1980 ml   Net -1980 ml       Physical Exam:     Physical Exam   Constitutional: He is oriented to person, place, and time  He appears well-developed and well-nourished  HENT:   Head: Normocephalic and atraumatic  Nose: Nose normal    Mouth/Throat: Oropharynx is clear and moist    Eyes: Pupils are equal, round, and reactive to light  Conjunctivae and EOM are normal    Neck: Normal range of motion  Neck supple  No JVD present  No thyromegaly present  Cardiovascular: Normal rate, regular rhythm and intact distal pulses  Exam reveals no gallop and no friction rub  No murmur heard  Pulmonary/Chest: Effort normal and breath sounds normal  No respiratory distress  Abdominal: Soft  Bowel sounds are normal  He exhibits no distension and no mass  There is no tenderness  There is no guarding  Percutaneous cholecystostomy tube and drain are in place   Musculoskeletal: Normal range of motion  He exhibits no edema  Lymphadenopathy:     He has no cervical adenopathy  Neurological: He is alert and oriented to person, place, and time  No cranial nerve deficit  Skin: Skin is warm  No rash noted  No erythema  Psychiatric: He has a normal mood and affect  His behavior is normal    Vitals reviewed        Additional Data:     Labs:    Results from last 7 days   Lab Units 12/11/19  0438   WBC Thousand/uL 8 90   HEMOGLOBIN g/dL 10 3*   HEMATOCRIT % 30 4*   PLATELETS Thousands/uL 169   NEUTROS PCT % 78*   LYMPHS PCT % 10*   MONOS PCT % 11   EOS PCT % 0     Results from last 7 days   Lab Units 12/11/19  0438   POTASSIUM mmol/L 3 8   CHLORIDE mmol/L 105   CO2 mmol/L 23   BUN mg/dL 33*   CREATININE mg/dL 2 34*   CALCIUM mg/dL 8 5*   ALK PHOS U/L 100   ALT U/L 15   AST U/L 11*         Results from last 7 days   Lab Units 12/11/19  1115 12/11/19  0608 12/10/19  2031   POC GLUCOSE mg/dl 247* 110 119           * I Have Reviewed All Lab Data Listed Above  * Additional Pertinent Lab Tests Reviewed: Laura 66 Admission  Reviewed    Imaging:  Imaging Reports Reviewed Today Include:  None    Recent Cultures (last 7 days):           Last 24 Hours Medication List:     Current Facility-Administered Medications:  acetaminophen 325 mg Oral PRN Venora Loan, CRNP   amLODIPine 10 mg Oral Daily Venora Loan, CRNP   aspirin 81 mg Oral Daily Venora Loan, CRNP   b complex-vitamin C-folic acid 1 capsule Oral Daily With Dinner Venora Loan, CRNP   cholecalciferol 1,000 Units Oral Daily Venora Loan, CRNP   colchicine 0 6 mg Oral Daily Venora Loan, CRNP   heparin (porcine) 5,000 Units Subcutaneous [facility] Venora Loan, CRNP   HYDROmorphone 0 5 mg Intravenous Q3H PRN Venora Loan, CRNP   insulin detemir 10 Units Subcutaneous HS Venora Loan, CRNP   insulin lispro 1-5 Units Subcutaneous TID AC Kayce Talon, CRNP   insulin lispro 1-5 Units Subcutaneous HS Venora Loan, CRNP   methenamine hippurate 1 g Oral TID Venora Loan, CRNP   ondansetron 4 mg Intravenous Q6H PRN Venora Loan, CRNP   oxyCODONE-acetaminophen 1 tablet Oral Q4H PRN Venora Loan, CRNP   pantoprazole 40 mg Oral Early Morning Venora Loan, CRNP   simethicone 80 mg Oral Q6H PRN Venora Loan, CRNP   sodium bicarbonate 650 mg Oral BID after meals Venora Loan, CRNP   tamsulosin 0 4 mg Oral Daily With [address]        Today, Patient Was Seen By: Jeanine Ac MD    ** Please Note: Dictation voice to text software may have been used in the creation of this document   **

## 2019-12-11 NOTE — SOCIAL WORK
Evaluated the pt at the bedside  Pt was admitted to the hospital with SOB  Explained the role of CM and the options of discharge planning with the pt  Pt states he lives with his SO in a 1 story home with 4 BETH  Pt states he is independent with ADL's  Pt spouse provides all the IADL's including driving  Pt states he is [facility] with [facility]  Explained the Observation level of care with the pt  Pt verbalized understanding of same  Pt gets his medications from [facility]  Pt was examined by Dr Leatha Genao and a recommendation was made to have the pt evaluated by hospice  A referral was [facility] to [facility]  Spoke to Allan Espinosa of same  CESAR Cotter at the bedside  Patient/caregiver received discharge checklist   Content reviewed  Patient/caregiver encouraged to participate in discharge plan of care prior to discharge home  CM reviewed d/c planning process including the following: identifying help at home, patient preference for d/c planning needs, availability of treatment team to discuss questions or concerns patient and/or family may have regarding understanding medications and recognizing signs and symptoms once discharged  CM also encouraged patient to follow up with all recommended appointments after discharge  Patient advised of importance for patient and family to participate in managing patients medical well being

## 2019-12-11 NOTE — PHYSICAL THERAPY NOTE
Physical Therapy Evaluation     Patient's Name: Anton Vera    Admitting Diagnosis  Shortness of breath [R06 02]  Dyspnea, unspecified type [R06 00]    Problem List  Patient Active Problem List   Diagnosis    Weakness generalized    Type 2 diabetes 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, with long-term current use of insulin ([address] )    Essential hypertension    Mixed hyperlipidemia    Cardiac disease    Hydroureter    Metabolic acidosis    Iron deficiency anemia secondary to inadequate dietary iron intake    Accelerated hypertension    Liver mass    Neuroendocrine tumor    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)    Pressure injury of right heel, unstageable ([address] )    Atherosclerosis of artery of extremity with ulceration (HCC)    Carcinoid syndrome (HCC)    Chronic indwelling Haile catheter    Moderate protein-calorie malnutrition ([address] )    Biliary sludge    Malignant neuroendocrine neoplasm ([address] )    Urinary retention    Cholecystostomy tube dysfunction    Acute pancreatitis without infection or necrosis    UTI due to extended-spectrum beta lactamase (ESBL) producing Escherichia coli    CKD (chronic kidney disease) stage 3, GFR 30-59 ml/min (HCC)    Anemia    Acute acalculous cholecystitis    Shortness of breath       Past Medical History  Past Medical History:   Diagnosis Date    Acute pancreatitis without infection or necrosis 9/26/2019    Anemia of chronic renal failure     BPH (benign prostatic hyperplasia)     Cancer (HCC)     liver cancer    Cardiac disease     Chronic kidney disease, stage 3 ([address] )     Coronary artery disease     Diabetes mellitus ([address] )     DJD (degenerative joint disease)     Essential hypertension     Gait disturbance     Generalized weakness     GERD (gastroesophageal reflux disease)     Gout     History of transfusion     Hydronephrosis     Hyperlipidemia     Hypertension     Liver cancer ([address] )     Pressure injury of skin  Proteinuria     Renal disorder     Retention of urine     Thrombophlebitis migrans     Type 2 diabetes mellitus (HCC)     Type 2 diabetes 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, with long-term current use of insulin ([facility] [address] 1/23/2019       Past Surgical History  Past Surgical History:   Procedure Laterality Date    CORONARY ANGIOPLASTY WITH STENT PLACEMENT      IR IMAGE GUIDED BIOPSY/ASPIRATION LIVER  1/24/2019    IR TUBE PLACEMENT ROQUE  9/6/2019 12/11/19 0920   Note Type   Note type Eval/Treat   Pain Assessment   Pain Assessment No/denies pain   Pain Score No Pain   Home Living   Type of [facility] One level; Able to live on main level with bedroom/bathroom; Performs ADLs on one level;Stairs to enter with rails  (2 BETH)   Bathroom Shower/Tub Tub/shower unit   Bathroom Toilet Standard   Bathroom Equipment Shower chair   [facility]   Prior Function   Level of [place] Independent with ADLs and functional mobility; Needs assistance with IADLs   Lives With Significant other   Receives Help From Family   ADL Assistance Independent   IADLs Needs assistance   Falls in the last 6 months 1 to 4   Vocational Retired   Restrictions/Precautions   [facility] Bearing Precautions Per Order No   Other Precautions Fall Risk;Contact/isolation   Cognition   Overall Cognitive Status WFL   Arousal/Participation Alert   Orientation Level Oriented X4   Memory Within functional limits   Following Commands Follows one step commands with increased time or repetition   RLE Assessment   RLE Assessment X  (grossly 4/5)   LLE Assessment   LLE Assessment X  (grossly 4/5)   Coordination   Movements are Fluid and Coordinated 0   Bed Mobility   Supine to Sit 3  Moderate assistance   Additional items Assist x 2; Increased time required;Verbal cues;LE management   Sit to Supine 3  Moderate assistance   Additional items Assist x 2; Increased time required;LE management;Verbal cues   Additional Comments pt supine in bed at end of session   Transfers   Sit to Stand 3  Moderate assistance   Additional items Assist x 2; Increased time required;Verbal cues   Stand to Sit 3  Moderate assistance   Additional items Assist x 2; Increased time required;Verbal cues   Toilet transfer 3  Moderate assistance   Additional items Assist x 2; Increased time required;Standard toilet   Ambulation/Elevation   Gait pattern Excessively slow; Short stride; Foward flexed   Gait Assistance 4  Minimal assist   Additional items Assist x 2   Assistive Device Rolling walker   Distance 10 feet x2   Stair Management Assistance Not tested   Balance   Static Sitting Fair   Dynamic Sitting Fair   Static Standing Fair -   Dynamic Standing Fair -   Ambulatory Poor +   Endurance Deficit   Endurance Deficit Yes   Activity Tolerance   Activity Tolerance Patient tolerated treatment well   Nurse Made Aware yes Donel Fam RN   Assessment   Prognosis Fair   Problem List Decreased strength;Decreased endurance; Impaired balance;Decreased mobility; Decreased coordination   Assessment Pt is [de-identified] y o  male seen for PT evaluation on 12/11/2019 s/p admit to [facility] on 12/10/2019 w/ Shortness of breath  PT consulted to assess pt's functional mobility and d/c needs  Order placed for PT eval and tx, w/ up w/ A order  Performed at least 2 patient identifiers during session: Name and wristband  Comorbidities affecting pt's physical performance at time of assessment include: HTN, DM2  PTA, pt was requiring A for mobility  Personal factors affecting pt at time of IE include: stairs to enter home, positive fall history, inability to perform IADLs and inability to perform ADLs   Please find objective findings from PT assessment regarding body systems outlined above with impairments and limitations including weakness, impaired balance, decreased endurance, impaired coordination, gait deviations, decreased activity tolerance, decreased functional mobility tolerance and fall risk, as well as mobility assessment (need for moderate  assist w/ all phases of mobility when usually ambulating independently)  The following objective measures performed on IE also reveal limitations: Barthel Index: 35/100  Pt's clinical presentation is currently unstable/unpredictable seen in pt's presentation of ongoing medical assessment  Pt to benefit from continued PT tx to address deficits as defined above and maximize level of functional independent mobility and consistency  From PT/mobility standpoint, recommendation at time of d/c would be STR pending progress in order to facilitate return to PLOF  Goals   Patient Goals feel better soon   STG Expiration Date 12/21/19   Short Term Goal #1 In 7-10 days: Increase bilateral LE strength 1/2 grade to facilitate independent mobility, Perform all bed mobility tasks with CGA to decrease caregiver burden, Perform all transfers with min A of 1 to improve independence, Ambulate > 50 ft  with RW with CGA w/o LOB and w/ normalized gait pattern 100% of the time, Navigate 2 stairs with min A of 1 with unilateral handrail to facilitate return to previous living environment, Increase all balance 1/2 grade to decrease risk for falls and Complete % of the time   PT Treatment Day 1   Plan   Treatment/Interventions Functional transfer training;LE strengthening/ROM; Elevations; Therapeutic exercise; Endurance training;Bed mobility;Gait training   PT Frequency   (3-5 x/wk)   Recommendation   Recommendation Short-term skilled PT   Equipment Recommended Walker   PT - OK to Discharge Yes  (yes if medically stable to STR)   Barthel Index   Feeding 5   Bathing 0   Grooming Score 5   Dressing Score 5   Bladder Score 0   Bowels Score 10   Toilet Use Score 5   Transfers (Bed/Chair) Score 5   Mobility (Level Surface) Score 0   Stairs Score 0   Barthel Index Score 35     S: Patient reported no pain or dizziness upon standing  O: Moderate assist x2 during sit to stand, gait training w/ minimal assist x 2  with focus on correct walker use, and toilet transfer with moderate x2 assist with assistance on /off and hygiene care  A: Patient tolerated session very well  Recommending pt for STR due to decreased activity tolerate and safety during functional mobility  P: Continue to increase ambulation distance and independence level during transfers and functional mobility         Lorne Chi

## 2019-12-11 NOTE — ASSESSMENT & PLAN NOTE
Lab Results   Component Value Date    HGBA1C 8 1 (H) 06/14/2019       Recent Labs     12/10/19  2031 12/11/19  0608 12/11/19  1115   POCGLU 119 110 247*       Blood Sugar Average: Last 72 hrs:  · (P) 702 [id number] resume lantus   · ISS   · Continue Accu-Cheks AC and HS, continue Lantus and sliding scale insulin coverage

## 2019-12-11 NOTE — PLAN OF CARE
Problem: PHYSICAL THERAPY ADULT  Goal: Performs mobility at highest level of function for planned discharge setting  See evaluation for individualized goals  Description  Treatment/Interventions: Functional transfer training, LE strengthening/ROM, Elevations, Therapeutic exercise, Endurance training, Bed mobility, Gait training  Equipment Recommended: Guido Reason       See flowsheet documentation for full assessment, interventions and recommendations  Note:   Prognosis: Fair  Problem List: Decreased strength, Decreased endurance, Impaired balance, Decreased mobility, Decreased coordination  Assessment: Pt is [de-identified] y o  male seen for PT evaluation on 12/11/2019 s/p admit to [facility] on 12/10/2019 w/ Shortness of breath  PT consulted to assess pt's functional mobility and d/c needs  Order placed for PT eval and tx, w/ up w/ A order  Performed at least 2 patient identifiers during session: Name and wristband  Comorbidities affecting pt's physical performance at time of assessment include: HTN, DM2  PTA, pt was requiring A for mobility  Personal factors affecting pt at time of IE include: stairs to enter home, positive fall history, inability to perform IADLs and inability to perform ADLs  Please find objective findings from PT assessment regarding body systems outlined above with impairments and limitations including weakness, impaired balance, decreased endurance, impaired coordination, gait deviations, decreased activity tolerance, decreased functional mobility tolerance and fall risk, as well as mobility assessment (need for moderate  assist w/ all phases of mobility when usually ambulating independently)  The following objective measures performed on IE also reveal limitations: Barthel Index: 35/100  Pt's clinical presentation is currently unstable/unpredictable seen in pt's presentation of ongoing medical assessment   Pt to benefit from continued PT tx to address deficits as defined above and maximize level of functional independent mobility and consistency  From PT/mobility standpoint, recommendation at time of d/c would be STR pending progress in order to facilitate return to PLOF  Recommendation: Short-term skilled PT     PT - OK to Discharge: Yes(yes if medically stable to STR)    See flowsheet documentation for full assessment

## 2019-12-12 VITALS
HEIGHT: 68 IN | TEMPERATURE: 98.3 F | OXYGEN SATURATION: 95 % | DIASTOLIC BLOOD PRESSURE: 60 MMHG | SYSTOLIC BLOOD PRESSURE: 130 MMHG | BODY MASS INDEX: 24.26 KG/M2 | RESPIRATION RATE: 18 BRPM | HEART RATE: 78 BPM | WEIGHT: 160.05 LBS

## 2019-12-12 LAB
ALBUMIN SERPL BCP-MCNC: 3.2 G/DL (ref 3.5–5.7)
ALP SERPL-CCNC: 100 U/L (ref 55–165)
ALT SERPL W P-5'-P-CCNC: 14 U/L (ref 7–52)
ANION GAP SERPL CALCULATED.3IONS-SCNC: 8 MMOL/L (ref 4–13)
AST SERPL W P-5'-P-CCNC: 8 U/L (ref 13–39)
ATRIAL RATE: 83 BPM
BASOPHILS # BLD AUTO: 0.1 THOUSANDS/ΜL (ref 0–0.1)
BASOPHILS NFR BLD AUTO: 1 % (ref 0–2)
BILIRUB SERPL-MCNC: 0.6 MG/DL (ref 0.2–1)
BUN SERPL-MCNC: 35 MG/DL (ref 7–25)
CALCIUM SERPL-MCNC: 8.5 MG/DL (ref 8.6–10.5)
CGA SERPL-SCNC: 357 NMOL/L (ref 0–5)
CHLORIDE SERPL-SCNC: 104 MMOL/L (ref 98–107)
CO2 SERPL-SCNC: 24 MMOL/L (ref 21–31)
CREAT SERPL-MCNC: 2.34 MG/DL (ref 0.7–1.3)
EOSINOPHIL # BLD AUTO: 0.1 THOUSAND/ΜL (ref 0–0.61)
EOSINOPHIL NFR BLD AUTO: 1 % (ref 0–5)
ERYTHROCYTE [DISTWIDTH] IN BLOOD BY AUTOMATED COUNT: 15.1 % (ref 11.5–14.5)
GFR SERPL CREATININE-BSD FRML MDRD: 25 ML/MIN/1.73SQ M
GLUCOSE SERPL-MCNC: 128 MG/DL (ref 65–99)
GLUCOSE SERPL-MCNC: 237 MG/DL (ref 65–140)
GLUCOSE SERPL-MCNC: 99 MG/DL (ref 65–140)
HCT VFR BLD AUTO: 31.6 % (ref 42–47)
HGB BLD-MCNC: 10.7 G/DL (ref 14–18)
LYMPHOCYTES # BLD AUTO: 0.9 THOUSANDS/ΜL (ref 0.6–4.47)
LYMPHOCYTES NFR BLD AUTO: 11 % (ref 21–51)
MCH RBC QN AUTO: 30.1 PG (ref 26–34)
MCHC RBC AUTO-ENTMCNC: 33.9 G/DL (ref 31–37)
MCV RBC AUTO: 89 FL (ref 81–99)
MONOCYTES # BLD AUTO: 1.3 THOUSAND/ΜL (ref 0.17–1.22)
MONOCYTES NFR BLD AUTO: 14 % (ref 2–12)
NEUTROPHILS # BLD AUTO: 6.4 THOUSANDS/ΜL (ref 1.4–6.5)
NEUTS SEG NFR BLD AUTO: 74 % (ref 42–75)
P AXIS: 88 DEGREES
PLATELET # BLD AUTO: 165 THOUSANDS/UL (ref 149–390)
PMV BLD AUTO: 8.9 FL (ref 8.6–11.7)
POTASSIUM SERPL-SCNC: 3.5 MMOL/L (ref 3.5–5.5)
PR INTERVAL: 158 MS
PROT SERPL-MCNC: 5.9 G/DL (ref 6.4–8.9)
QRS AXIS: -63 DEGREES
QRSD INTERVAL: 98 MS
QT INTERVAL: 390 MS
QTC INTERVAL: 458 MS
RBC # BLD AUTO: 3.56 MILLION/UL (ref 4.3–5.9)
SODIUM SERPL-SCNC: 136 MMOL/L (ref 134–143)
T WAVE AXIS: 94 DEGREES
VENTRICULAR RATE: 83 BPM
WBC # BLD AUTO: 8.8 THOUSAND/UL (ref 4.8–10.8)

## 2019-12-12 PROCEDURE — 80053 COMPREHEN METABOLIC PANEL: CPT | Performed by: HOSPITALIST

## 2019-12-12 PROCEDURE — 82948 REAGENT STRIP/BLOOD GLUCOSE: CPT

## 2019-12-12 PROCEDURE — 97535 SELF CARE MNGMENT TRAINING: CPT

## 2019-12-12 PROCEDURE — 99239 HOSP IP/OBS DSCHRG MGMT >30: CPT | Performed by: HOSPITALIST

## 2019-12-12 PROCEDURE — 93010 ELECTROCARDIOGRAM REPORT: CPT | Performed by: INTERNAL MEDICINE

## 2019-12-12 PROCEDURE — 97110 THERAPEUTIC EXERCISES: CPT

## 2019-12-12 PROCEDURE — 85025 COMPLETE CBC W/AUTO DIFF WBC: CPT | Performed by: HOSPITALIST

## 2019-12-12 RX ADMIN — SODIUM BICARBONATE 650 MG: 650 TABLET ORAL at 08:51

## 2019-12-12 RX ADMIN — HEPARIN SODIUM 5000 UNITS: 5000 INJECTION, SOLUTION INTRAVENOUS; SUBCUTANEOUS at 13:46

## 2019-12-12 RX ADMIN — INSULIN LISPRO 2 UNITS: 100 INJECTION, SOLUTION INTRAVENOUS; SUBCUTANEOUS at 11:48

## 2019-12-12 RX ADMIN — OXYCODONE HYDROCHLORIDE AND ACETAMINOPHEN 1 TABLET: 5; 325 TABLET ORAL at 06:10

## 2019-12-12 RX ADMIN — METHENAMINE HIPPURATE 1 G: 1 TABLET ORAL at 08:51

## 2019-12-12 RX ADMIN — HEPARIN SODIUM 5000 UNITS: 5000 INJECTION, SOLUTION INTRAVENOUS; SUBCUTANEOUS at 05:59

## 2019-12-12 RX ADMIN — ASPIRIN 81 MG 81 MG: 81 TABLET ORAL at 08:51

## 2019-12-12 RX ADMIN — COLCHICINE 0.6 MG: 0.6 TABLET, FILM COATED ORAL at 08:50

## 2019-12-12 RX ADMIN — VITAMIN D, TAB 1000IU (100/BT) 1000 UNITS: 25 TAB at 08:51

## 2019-12-12 RX ADMIN — PANTOPRAZOLE SODIUM 40 MG: 40 TABLET, DELAYED RELEASE ORAL at 05:59

## 2019-12-12 RX ADMIN — AMLODIPINE BESYLATE 10 MG: 5 TABLET ORAL at 08:45

## 2019-12-12 NOTE — ASSESSMENT & PLAN NOTE
Malnutrition Findings:           BMI Findings: Body mass index is 24 34 kg/m²     · Dietitian consult   · Dietary supplement as indicated

## 2019-12-12 NOTE — DISCHARGE SUMMARY
Discharge- Jessy Mcgraw 1940, [de-identified] y o  male MRN: [id number]    Unit/Bed#: -01 Encounter: [id number]    Primary Care Provider: Toribio Bergeron DO   Date and time admitted to hospital: 12/10/2019 11:54 AM        * Shortness of breath  Assessment & Plan  · Resolved  · Unspecified exact etiology  · Possibly related to pain and/or anxiety  · Patient is currently on room air   · CXR shows no acute infiltrates  · Okay for discharge, no further inpatient workup    Goals of care, counseling/discussion  Assessment & Plan  · I had a long discussion with the patient, his wife [facility], and then separately with his Hematology oncologist Dr Phani Sanchez over the phone  · The patient and his wife state that they are tired, and went on to state that they were recently told that he is no longer candidate for some form of goal directed radiation therapy into the liver  I spoke to them in great depth in regards to quality versus quantity of life  At this time they are open for a hospice evaluation  Hospice have been consulted  Dr Phani Sanchez his primary Hematology oncologist is in agreement with this plan    --------------------  · Update on 12/12/2019-patient is status post a hospice evaluation  The need a little bit more time to think about this  Information has been provided to them in regards to how to get in touch with hospice as an outpatient  Patient is otherwise stable for discharge today    I reviewed his case with his primary care physician Dr Travis Jackson over the telephone, Dr Travis Jackson has been made aware of the discussions that were initiated on this admission    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 [facility])  Assessment & Plan  · Follows with Dr Phani Sanchez hem/onc OP   · He was found not to be a candidate for the [facility] Sphere treatment   · Currently on Lanreotide on a monthly basis   · he will follow up with Heme-Onc as an outpatient    Type 2 diabetes 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, with long-term current use of insulin [facility])  Assessment & Plan  Lab Results   Component Value Date    HGBA1C 8 1 (H) 06/14/2019       Recent Labs     12/11/19  1603 12/11/19  2110 12/12/19  0712 12/12/19  1120   POCGLU 250* 228* 99 237*       Blood Sugar Average: Last 72 hrs:  · Stable, discharged home on pre-admit meds at pre-admit dosages    Biliary sludge  Assessment & Plan  · With percutaneous cholecystostomy tube  · The patient is not a candidate for cholecystectomy   · IR tube check/tube exchange done 12/10/2019  · No apparent occlusion or residual contrast fluid in the gallbladder; contrast did not produce colic       Essential hypertension  Assessment & Plan  · DC home on pre-admit meds at pre-admit dosages    Moderate protein-calorie malnutrition ([address] )  Assessment & Plan  Malnutrition Findings:           BMI Findings: Body mass index is 24 34 kg/m²  · Dietitian consult   · Dietary supplement as indicated           Discharging Physician / Practitioner: Jonathan Alexis MD  PCP: Alejo Sheikh DO  Admission Date:   Admission Orders (From admission, onward)     Ordered        12/11/19 1428  Inpatient Admission  Once         12/10/19 1821  Place in Observation  Once                   Discharge Date: 12/12/19    Resolved Problems  Date Reviewed: 12/10/2019    None          Consultations During Hospital Stay:  · Hospice  · Interventional Radiology    Procedures Performed:   · Percutaneous cholecystostomy tube change on 12/10/2019 by Interventional Radiology    Significant Findings / Test Results:   · Chest x-ray-no focal consolidation  · CT abdomen pelvis-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  Limited evaluation of the hepatic parenchyma due to lack of contrast   Subtle low density lesions are seen consistent with hepatic metastasis   There are small mesenteric and para-aortic nodes identified which are unchanged in size and configuration   Small nodes in the right epicardial fat seen on series 2 images 13 through 15 appears slightly larger  Incidental Findings:   · None     Test Results Pending at Discharge (will require follow up): · None     Outpatient Tests Requested:  · None    Complications:     None    Reason for Admission:  Shortness of breath/right upper quadrant pain    Hospital Course:     Mike Leon is a [de-identified] y o  male patient who originally presented to the hospital on 12/10/2019 due to shortness of breath  Please refer to the initial history and physical examination completed by Eduardo Aguilar for the initial presenting features and complaints  In brief the patient is an 65-year-old male who presented to the emergency department at the instructions of his Hematology oncologist because of some shortness of breath and right upper quadrant pain  At the time of arrival the Heme-Onc doctor was also a little worried about his white blood cell count  In the emergency room, the patient had a chest x-ray which was normal   Additionally, at no point did the patient require supplemental oxygen  It was felt that his shortness of breath was most likely secondary to cancer related pain in the right upper quadrant verses anxiety  An Interventional Radiology consultation was obtained  The patient overall is not a candidate for a cholecystectomy and has a percutaneous cholecystostomy tube and drain in place  The tube was exchanged, and it was functioning well  Patient required IV pain medications while in-house  Goals of care were also discussed  Case was reviewed with his Hematology oncologist over the phone, and it was also reviewed with the patient's primary care physician over the phone  Patient has had multiple admissions for various reasons, and we were told that he is no longer a candidate for some sort of goal directed radiation/chemotherapy to his liver    All parties were in agreement that it would be okay for a hospice evaluation  Patient was seen by hospice on day of discharge  After being evaluated, both the patient and his wife felt that they needed a little more time to think about it  All information was provided to them in regards to how to get a hold of hospice if they do indeed decide to sign on  At time of discharge patient was given the instructions to take all of his pre-admission medications at the preadmission dosages, and follow up with his primary care as well as Hematology oncologist   Both the patient and his wife verbalized understanding  Please see above list of diagnoses and related plan for additional information  Condition at Discharge: fair     Discharge Day Visit / Exam:     Subjective:  Patient seen and examined, looks and feels much better, denies any pain or discomfort today  Vitals: Blood Pressure: 130/60 (12/12/19 0845)  Pulse: 78 (12/12/19 0723)  Temperature: 98 3 °F (36 8 °C) (12/12/19 0723)  Temp Source: Temporal (12/12/19 0723)  Respirations: 18 (12/12/19 0723)  Height: 5' 8" (172 7 cm) (12/10/19 1933)  Weight - Scale: 72 6 kg (160 lb 0 9 oz) (12/12/19 0600)  SpO2: 95 % (12/12/19 0723)  Exam:   Physical Exam   Constitutional: He is oriented to person, place, and time  He appears well-developed and well-nourished  HENT:   Head: Normocephalic and atraumatic  Nose: Nose normal    Mouth/Throat: Oropharynx is clear and moist    Eyes: Pupils are equal, round, and reactive to light  Conjunctivae and EOM are normal    Neck: Normal range of motion  Neck supple  No JVD present  No thyromegaly present  Cardiovascular: Normal rate, regular rhythm and intact distal pulses  Exam reveals no gallop and no friction rub  No murmur heard  Pulmonary/Chest: Effort normal and breath sounds normal  No respiratory distress  Abdominal: Soft  Bowel sounds are normal  He exhibits no distension and no mass  There is no tenderness  There is no guarding     Percutaneous cholecystostomy tube and drain are in place   Musculoskeletal: Normal range of motion  He exhibits no edema  Lymphadenopathy:     He has no cervical adenopathy  Neurological: He is alert and oriented to person, place, and time  No cranial nerve deficit  Skin: Skin is warm  No rash noted  No erythema  Psychiatric: He has a normal mood and affect  His behavior is normal    Vitals reviewed  Discussion with Family:  Patient's wife Ezio Sneed was bedside at the time of my discharge evaluation, all questions answered to her satisfaction    Discharge instructions/Information to patient and family:   See after visit summary for information provided to patient and family  Provisions for Follow-Up Care:  See after visit summary for information related to follow-up care and any pertinent home health orders  Disposition:     Home with VNA Services (Reminder: Complete face to face encounter)    For Discharges to [facility] SNF:   · Not Applicable to this Patient - Not Applicable to this Patient    Planned Readmission:    None     Discharge Statement:  I spent 40 minutes discharging the patient  This time was spent on the day of discharge  I had direct contact with the patient on the day of discharge  Greater than 50% of the total time was spent examining patient, answering all patient questions, arranging and discussing plan of care with patient as well as directly providing post-discharge instructions  Additional time then spent on discharge activities  Discharge Medications:  See after visit summary for reconciled discharge medications provided to patient and family        ** Please Note: This note has been constructed using a voice recognition system **

## 2019-12-12 NOTE — ASSESSMENT & PLAN NOTE
· Resolved  · Unspecified exact etiology  · Possibly related to pain and/or anxiety  · Patient is currently on room air   · CXR shows no acute infiltrates  · Okay for discharge, no further inpatient workup

## 2019-12-12 NOTE — ASSESSMENT & PLAN NOTE
Lab Results   Component Value Date    HGBA1C 8 1 (H) 06/14/2019       Recent Labs     12/11/19  1603 12/11/19  2110 12/12/19  0712 12/12/19  1120   POCGLU 250* 228* 99 237*       Blood Sugar Average: Last 72 hrs:  · Stable, discharged home on pre-admit meds at pre-admit dosages

## 2019-12-12 NOTE — NURSING NOTE
Pt IV removed and dry dressing placed  Pt was assisted in getting dressed by PCA  Pt was placed in wheelchair and brought to lobby by RN  Pt being taken home by SO  Belonging were returned and present in the room  Discharge instructions given to SO and pt

## 2019-12-12 NOTE — ASSESSMENT & PLAN NOTE
· Follows with Dr Tano De Leon hem/onc OP   · He was found not to be a candidate for the [facility] Sphere treatment   · Currently on Lanreotide on a monthly basis   · he will follow up with Heme-Onc as an outpatient

## 2019-12-12 NOTE — PLAN OF CARE
Problem: Potential for Falls  Goal: Patient will remain free of falls  Description  INTERVENTIONS:  - Assess patient frequently for physical needs  -  Identify cognitive and physical deficits and behaviors that affect risk of falls    -  [place] fall precautions as indicated by assessment   - Educate patient/family on patient safety including physical limitations  - Instruct patient to call for assistance with activity based on assessment  - Modify environment to reduce risk of injury  - Consider OT/PT consult to assist with strengthening/mobility  Outcome: Progressing     Problem: PAIN - ADULT  Goal: Verbalizes/displays adequate comfort level or baseline comfort level  Description  Interventions:  - Encourage patient to monitor pain and request assistance  - Assess pain using appropriate pain scale  - Administer analgesics based on type and severity of pain and evaluate response  - Implement non-pharmacological measures as appropriate and evaluate response  - Consider cultural and social influences on pain and pain management  - Notify physician/advanced practitioner if interventions unsuccessful or patient reports new pain  Outcome: Progressing     Problem: INFECTION - ADULT  Goal: Absence or prevention of progression during hospitalization  Description  INTERVENTIONS:  - Assess and monitor for signs and symptoms of infection  - Monitor lab/diagnostic results  - Monitor all insertion sites, i e  indwelling lines, tubes, and drains  - Administer medications as ordered  - Instruct and encourage patient and family to use good hand hygiene technique  - Identify and instruct in appropriate isolation precautions for identified infection/condition   Outcome: Progressing  Goal: Absence of fever/infection during neutropenic period  Description  INTERVENTIONS:  - Monitor WBC    Outcome: Progressing     Problem: SAFETY ADULT  Goal: Patient will remain free of falls  Description  INTERVENTIONS:  - Assess patient frequently for physical needs  -  Identify cognitive and physical deficits and behaviors that affect risk of falls    -  [place] fall precautions as indicated by assessment   - Educate patient/family on patient safety including physical limitations  - Instruct patient to call for assistance with activity based on assessment  - Modify environment to reduce risk of injury  - Consider OT/PT consult to assist with strengthening/mobility  Outcome: Progressing  Goal: Maintain or return to baseline ADL function  Description  INTERVENTIONS:  -  Assess patient's ability to carry out ADLs; assess patient's baseline for ADL function and identify physical deficits which impact ability to perform ADLs (bathing, care of mouth/teeth, toileting, grooming, dressing, etc )  - Assess/evaluate cause of self-care deficits   - Assess range of motion  - Assess patient's mobility; develop plan if impaired  - Assess patient's need for assistive devices and provide as appropriate  - Encourage maximum independence but intervene and supervise when necessary  - Involve family in performance of ADLs  - Assess for home care needs following discharge   - Consider OT consult to assist with ADL evaluation and planning for discharge  - Provide patient education as appropriate  Outcome: Progressing  Goal: Maintain or return mobility status to optimal level  Description  INTERVENTIONS:  - Assess patient's baseline mobility status (ambulation, transfers, stairs, etc )    - Identify cognitive and physical deficits and behaviors that affect mobility  - Identify mobility aids required to assist with transfers and/or ambulation (gait belt, sit-to-stand, lift, walker, cane, etc )  - [place] fall precautions as indicated by assessment  - Record patient progress and toleration of activity level on Mobility SBAR; progress patient to next Phase/Stage  - Instruct patient to call for assistance with activity based on assessment  - Consider rehabilitation consult to assist with strengthening/weightbearing, etc   Outcome: Progressing     Problem: DISCHARGE PLANNING  Goal: Discharge to home or other facility with appropriate resources  Description  INTERVENTIONS:  - Identify barriers to discharge w/patient and caregiver  - Arrange for needed discharge resources and transportation as appropriate  - Identify discharge learning needs (meds, wound care, etc )  - Refer to Case Management Department for coordinating discharge planning if the patient needs post-hospital services based on physician/advanced practitioner order or complex needs related to functional status, cognitive ability, or social support system   Outcome: Progressing     Problem: GASTROINTESTINAL - ADULT  Goal: Minimal or absence of nausea and/or vomiting  Description  INTERVENTIONS:  - Administer IV fluids if ordered to ensure adequate hydration  - Provide nonpharmacologic comfort measures as appropriate     Outcome: Progressing  Goal: Maintains or returns to baseline bowel function  Description  INTERVENTIONS:  - Assess bowel function  - Encourage oral fluids to ensure adequate hydration  - Administer IV fluids if ordered to ensure adequate hydration  - Administer ordered medications as needed  - Encourage mobilization and activity  - Consider nutritional services referral to assist patient with adequate nutrition and appropriate food choices  Outcome: Progressing  Goal: Maintains adequate nutritional intake  Description       Outcome: Progressing     Problem: DISCHARGE PLANNING - CARE MANAGEMENT  Goal: Discharge to post-acute care or home with appropriate resources  Description  INTERVENTIONS:  - Conduct assessment to determine patient/family and health care team treatment goals, and need for post-acute services based on payer coverage, community resources, and patient preferences, and barriers to discharge  - Address psychosocial, clinical, and financial barriers to discharge as identified in assessment in conjunction with the patient/family and health care team  - Arrange appropriate level of post-acute services according to patient's   needs and preference and payer coverage in collaboration with the physician and health care team  - Communicate with and update the patient/family, physician, and health care team regarding progress on the discharge plan  - Arrange appropriate transportation to post-acute venues  Pt will be having an evaluation with [facility]     Outcome: Progressing

## 2019-12-12 NOTE — ASSESSMENT & PLAN NOTE
· With percutaneous cholecystostomy tube  · The patient is not a candidate for cholecystectomy   · IR tube check/tube exchange done 12/10/2019  · No apparent occlusion or residual contrast fluid in the gallbladder; contrast did not produce colic

## 2019-12-12 NOTE — SOCIAL WORK
Pt ahd an evaluation with Na from [facility] and the pt has declined hospice at this time  Pt will go home and resume services with [facility]  A referral has been made for same  SO will transport home  TC to Dr Aleshia Mcghee office, spoke to [facility] and made appt for 1/6/20 at 1:30  SO Cyndee made aware

## 2019-12-12 NOTE — ASSESSMENT & PLAN NOTE
· I had a long discussion with the patient, his wife Surendra Harrington, and then separately with his Hematology oncologist Dr Priscilla Brunner over the phone  · The patient and his wife state that they are tired, and went on to state that they were recently told that he is no longer candidate for some form of goal directed radiation therapy into the liver  I spoke to them in great depth in regards to quality versus quantity of life  At this time they are open for a hospice evaluation  Hospice have been consulted  Dr Priscilla Brunner his primary Hematology oncologist is in agreement with this plan    --------------------  · Update on 12/12/2019-patient is status post a hospice evaluation  The need a little bit more time to think about this  Information has been provided to them in regards to how to get in touch with hospice as an outpatient  Patient is otherwise stable for discharge today    I reviewed his case with his primary care physician Dr Natasha Burks over the telephone, Dr Natasha Burks has been made aware of the discussions that were initiated on this admission

## 2019-12-12 NOTE — PLAN OF CARE
Problem: PHYSICAL THERAPY ADULT  Goal: Performs mobility at highest level of function for planned discharge setting  See evaluation for individualized goals  Description  Treatment/Interventions: Functional transfer training, LE strengthening/ROM, Elevations, Therapeutic exercise, Endurance training, Bed mobility, Gait training  Equipment Recommended: William Paget       See flowsheet documentation for full assessment, interventions and recommendations  Outcome: Progressing  Note:   Prognosis: Fair  Problem List: Decreased strength, Decreased endurance, Impaired balance, Decreased mobility, Decreased coordination, Pain  Assessment: Pt seen for PT treatment session this date with interventions consisting of Therapeutic exercise consisting of: AROM 20 reps B LE in supine position  Pt agreeable to PT treatment session upon arrival, pt found supine in bed w/ HOB elevated, in no apparent distress  In comparison to previous session, pt with improvements in ex tolerance  Post session: all needs in reach in bed with SCDs active  Continue to recommend STR at time of d/c in order to maximize pt's functional independence and safety w/ mobility  Pt continues to be functioning below baseline level, and remains limited 2* factors listed above and including decreased strength, endurance & safe functional mobility  PT will continue to see pt while here in order to address the deficits listed above and provide interventions consistent w/ POC in effort to achieve STGs  Recommendation: Short-term skilled PT     PT - OK to Discharge: Yes(when med cleared to STR)    See flowsheet documentation for full assessment

## 2019-12-12 NOTE — PHYSICAL THERAPY NOTE
12/12/19 0952   Pain Assessment   Pain Assessment 0-10   Pain Score 8   Pain Type Acute pain   Pain Location Abdomen   Pain Orientation Right   Pain Descriptors Sharp; Stabbing   Pain Frequency Intermittent   Pain Onset Sudden   Patient's Stated Pain Goal No pain   Hospital Pain Intervention(s) Medication (See MAR); Ambulation/increased activity   Multiple Pain Sites No   Restrictions/Precautions   Weight Bearing Precautions Per Order No   Other Precautions Contact/isolation; Fall Risk;Pain   General   Chart Reviewed Yes   Family/Caregiver Present Yes  (SO)   Cognition   Overall Cognitive Status WFL   Arousal/Participation Alert; Cooperative   Attention Within functional limits   Orientation Level Oriented X4   Memory Within functional limits   Following Commands Follows one step commands with increased time or repetition   Comments pt agreed to PT session-ex in bed, he deferred transfers OOB   Subjective   Subjective "I'm getting stabbing pains in my R side when I move  I don't want to get up "   Endurance Deficit   Endurance Deficit Yes   Activity Tolerance   Activity Tolerance Patient limited by fatigue;Patient limited by pain   Exercises   Quad Sets Supine;20 reps;AROM; Bilateral   Heelslides Supine;20 reps;AROM; Bilateral   Glute Sets Supine;20 reps;AROM; Bilateral   Ankle Pumps Supine;20 reps;AROM; Bilateral   Assessment   Prognosis Fair   Problem List Decreased strength;Decreased endurance; Impaired balance;Decreased mobility; Decreased coordination;Pain   Assessment Pt seen for PT treatment session this date with interventions consisting of Therapeutic exercise consisting of: AROM 20 reps B LE in supine position  Pt agreeable to PT treatment session upon arrival, pt found supine in bed w/ HOB elevated, in no apparent distress  In comparison to previous session, pt with improvements in ex tolerance  Post session: all needs in reach in bed with SCDs active   Continue to recommend STR at time of d/c in order to maximize pt's functional independence and safety w/ mobility  Pt continues to be functioning below baseline level, and remains limited 2* factors listed above and including decreased strength, endurance & safe functional mobility  PT will continue to see pt while here in order to address the deficits listed above and provide interventions consistent w/ POC in effort to achieve STGs  Goals   Patient Goals to have less pain   PT Treatment Day 2   Plan   Treatment/Interventions Functional transfer training;LE strengthening/ROM; Elevations; Therapeutic exercise; Endurance training;Patient/family training;Equipment eval/education; Bed mobility;Gait training   Progress Slow progress, decreased activity tolerance   PT Frequency   (3-5 x week)   Recommendation   Recommendation Short-term skilled PT   Equipment Recommended Walker   PT - OK to Discharge Yes  (when med cleared to STR)   Teresita Ask, PTA

## 2019-12-12 NOTE — OCCUPATIONAL THERAPY NOTE
12/12/19 1032   Restrictions/Precautions   Weight Bearing Precautions Per Order No   Other Precautions Contact/isolation; Fall Risk   General   Family/Caregiver Present female partner   Lifestyle   Reciprocal Relationships partner reports that patient does do most self-care at home when he does not have pain   Pain Assessment   Pain Assessment   (pt  chose to remain in current position to keep pain away )   ADL   Where Assessed Other (Comment)  (sitting in bed)   Grooming Assistance 5  Supervision/Setup   Grooming Deficit Setup   Grooming Comments patient completed brushing of teeth    UB Bathing Assistance 5  Supervision/Setup   UB Bathing Deficit Setup; Increased time to complete   UB Bathing Comments patient wiped face, hands and forearms per desire   LB Bathing Comments denied LB care at this time    Cognition   Overall Cognitive Status [facility]   Arousal/Participation Alert; Cooperative   Activity Tolerance   Activity Tolerance Patient limited by fatigue;Patient limited by pain  (chronically )   Assessment   Assessment Patient participated in Skilled OT session this date with interventions consisting of ADL re training with the use of correct body mechnaics   Patient agreeable to OT treatment session, upon arrival patient was found seated in bed, with wedge prop  Patient requiring frequent rest periods  Patient continues to be functioning below baseline level, occupational performance remains limited secondary to factors listed above and increased risk for falls and injury  From OT standpoint, recommendation at time of d/c would be Short Term Rehab  Patient to benefit from continued Occupational Therapy treatment while in the hospital to address deficits as defined above and maximize level of functional independence with ADLs and functional mobility  Plan   Treatment Interventions ADL retraining; Activityengagement   Goal Expiration Date 12/21/19   OT Treatment Day 172 TAWNYA Goodman/L

## 2019-12-16 ENCOUNTER — HOSPITAL ENCOUNTER (OUTPATIENT)
Dept: INFUSION CENTER | Facility: HOSPITAL | Age: 79
Discharge: HOME | End: 2019-12-16
Payer: COMMERCIAL

## 2019-12-16 VITALS
HEART RATE: 84 BPM | RESPIRATION RATE: 20 BRPM | TEMPERATURE: 97.1 F | DIASTOLIC BLOOD PRESSURE: 64 MMHG | SYSTOLIC BLOOD PRESSURE: 146 MMHG

## 2019-12-16 DIAGNOSIS — E34.0 CARCINOID SYNDROME (HCC): Primary | ICD-10-CM

## 2019-12-16 DIAGNOSIS — C7B.8 NEUROENDOCRINE CARCINOMA METASTATIC TO LIVER (HCC): ICD-10-CM

## 2019-12-16 DIAGNOSIS — D3A.8 NEUROENDOCRINE TUMOR: ICD-10-CM

## 2019-12-16 DIAGNOSIS — C7A.8 MALIGNANT NEUROENDOCRINE NEOPLASM (HCC): ICD-10-CM

## 2019-12-16 DIAGNOSIS — C7A.8 NEUROENDOCRINE CARCINOMA METASTATIC TO LIVER (HCC): ICD-10-CM

## 2019-12-16 PROCEDURE — 96372 THER/PROPH/DIAG INJ SC/IM: CPT

## 2019-12-16 RX ORDER — LANREOTIDE ACETATE 120 MG/.5ML
120 INJECTION SUBCUTANEOUS ONCE
Status: CANCELLED | OUTPATIENT
Start: 2020-01-14

## 2019-12-16 RX ORDER — LANREOTIDE ACETATE 120 MG/.5ML
120 INJECTION SUBCUTANEOUS ONCE
Status: COMPLETED | OUTPATIENT
Start: 2019-12-16 | End: 2019-12-16

## 2019-12-16 RX ADMIN — LANREOTIDE ACETATE 120 MG: 120 INJECTION SUBCUTANEOUS at 13:24

## 2019-12-16 NOTE — PLAN OF CARE
Problem: Potential for Falls  Goal: Patient will remain free of falls  Description  INTERVENTIONS:  - Assess patient frequently for physical needs  -  Identify cognitive and physical deficits and behaviors that affect risk of falls    -  [place] fall precautions as indicated by assessment   - Educate patient/family on patient safety including physical limitations  - Instruct patient to call for assistance with activity based on assessment  - Modify environment to reduce risk of injury  - Consider OT/PT consult to assist with strengthening/mobility  Outcome: Progressing

## 2019-12-17 ENCOUNTER — HOSPITAL ENCOUNTER (OUTPATIENT)
Dept: INFUSION CENTER | Facility: HOSPITAL | Age: 79
Discharge: HOME | End: 2019-12-17

## 2019-12-26 DIAGNOSIS — E11.65 TYPE 2 DIABETES MELLITUS WITH HYPERGLYCEMIA, WITH LONG-TERM CURRENT USE OF INSULIN (HCC): ICD-10-CM

## 2019-12-26 DIAGNOSIS — Z79.4 TYPE 2 DIABETES MELLITUS WITH HYPERGLYCEMIA, WITH LONG-TERM CURRENT USE OF INSULIN (HCC): ICD-10-CM

## 2020-01-01 ENCOUNTER — APPOINTMENT (OUTPATIENT)
Dept: LAB | Facility: CLINIC | Age: 80
End: 2020-01-01
Payer: COMMERCIAL

## 2020-01-01 ENCOUNTER — TRANSITIONAL CARE MANAGEMENT (OUTPATIENT)
Dept: FAMILY MEDICINE CLINIC | Facility: CLINIC | Age: 80
End: 2020-01-01

## 2020-01-01 ENCOUNTER — OFFICE VISIT (OUTPATIENT)
Dept: HEMATOLOGY ONCOLOGY | Facility: CLINIC | Age: 80
End: 2020-01-01
Payer: COMMERCIAL

## 2020-01-01 ENCOUNTER — TELEPHONE (OUTPATIENT)
Dept: NEPHROLOGY | Facility: CLINIC | Age: 80
End: 2020-01-01

## 2020-01-01 ENCOUNTER — APPOINTMENT (INPATIENT)
Dept: NUCLEAR MEDICINE | Facility: HOSPITAL | Age: 80
DRG: 392 | End: 2020-01-01
Payer: COMMERCIAL

## 2020-01-01 ENCOUNTER — HOSPITAL ENCOUNTER (INPATIENT)
Facility: HOSPITAL | Age: 80
LOS: 2 days | Discharge: HOME HEALTH | DRG: 683 | End: 2020-06-26
Attending: EMERGENCY MEDICINE | Admitting: INTERNAL MEDICINE
Payer: COMMERCIAL

## 2020-01-01 ENCOUNTER — LAB (OUTPATIENT)
Dept: LAB | Facility: CLINIC | Age: 80
End: 2020-01-01
Payer: COMMERCIAL

## 2020-01-01 ENCOUNTER — HOSPITAL ENCOUNTER (INPATIENT)
Facility: HOSPITAL | Age: 80
LOS: 1 days | Discharge: HOME HEALTH | DRG: 683 | End: 2020-09-22
Attending: EMERGENCY MEDICINE | Admitting: INTERNAL MEDICINE
Payer: COMMERCIAL

## 2020-01-01 ENCOUNTER — TELEPHONE (OUTPATIENT)
Dept: FAMILY MEDICINE CLINIC | Facility: CLINIC | Age: 80
End: 2020-01-01

## 2020-01-01 ENCOUNTER — APPOINTMENT (EMERGENCY)
Dept: RADIOLOGY | Facility: HOSPITAL | Age: 80
DRG: 392 | End: 2020-01-01
Payer: COMMERCIAL

## 2020-01-01 ENCOUNTER — APPOINTMENT (EMERGENCY)
Dept: RADIOLOGY | Facility: HOSPITAL | Age: 80
DRG: 872 | End: 2020-01-01
Payer: COMMERCIAL

## 2020-01-01 ENCOUNTER — OFFICE VISIT (OUTPATIENT)
Dept: NEPHROLOGY | Facility: CLINIC | Age: 80
End: 2020-01-01
Payer: COMMERCIAL

## 2020-01-01 ENCOUNTER — APPOINTMENT (OUTPATIENT)
Dept: NON INVASIVE DIAGNOSTICS | Facility: HOSPITAL | Age: 80
DRG: 683 | End: 2020-01-01
Payer: COMMERCIAL

## 2020-01-01 ENCOUNTER — APPOINTMENT (OUTPATIENT)
Dept: LAB | Facility: HOSPITAL | Age: 80
End: 2020-01-01
Payer: COMMERCIAL

## 2020-01-01 ENCOUNTER — TELEPHONE (OUTPATIENT)
Dept: CARDIOLOGY CLINIC | Facility: CLINIC | Age: 80
End: 2020-01-01

## 2020-01-01 ENCOUNTER — HOSPITAL ENCOUNTER (OUTPATIENT)
Dept: INFUSION CENTER | Facility: HOSPITAL | Age: 80
Discharge: HOME | End: 2020-06-03
Payer: COMMERCIAL

## 2020-01-01 ENCOUNTER — OFFICE VISIT (OUTPATIENT)
Dept: FAMILY MEDICINE CLINIC | Facility: CLINIC | Age: 80
End: 2020-01-01
Payer: COMMERCIAL

## 2020-01-01 ENCOUNTER — APPOINTMENT (OUTPATIENT)
Dept: MRI IMAGING | Facility: HOSPITAL | Age: 80
DRG: 683 | End: 2020-01-01
Payer: COMMERCIAL

## 2020-01-01 ENCOUNTER — APPOINTMENT (OUTPATIENT)
Dept: RADIOLOGY | Facility: HOSPITAL | Age: 80
End: 2020-01-01
Payer: COMMERCIAL

## 2020-01-01 ENCOUNTER — APPOINTMENT (OUTPATIENT)
Dept: PHYSICAL THERAPY | Facility: CLINIC | Age: 80
End: 2020-01-01
Payer: COMMERCIAL

## 2020-01-01 ENCOUNTER — APPOINTMENT (EMERGENCY)
Dept: RADIOLOGY | Facility: HOSPITAL | Age: 80
DRG: 149 | End: 2020-01-01
Payer: COMMERCIAL

## 2020-01-01 ENCOUNTER — APPOINTMENT (INPATIENT)
Dept: CT IMAGING | Facility: HOSPITAL | Age: 80
DRG: 392 | End: 2020-01-01
Payer: COMMERCIAL

## 2020-01-01 ENCOUNTER — HOSPITAL ENCOUNTER (OUTPATIENT)
Dept: INFUSION CENTER | Facility: HOSPITAL | Age: 80
Discharge: HOME | End: 2020-08-26
Payer: COMMERCIAL

## 2020-01-01 ENCOUNTER — APPOINTMENT (EMERGENCY)
Dept: CT IMAGING | Facility: HOSPITAL | Age: 80
DRG: 683 | End: 2020-01-01
Payer: COMMERCIAL

## 2020-01-01 ENCOUNTER — EVALUATION (OUTPATIENT)
Dept: PHYSICAL THERAPY | Facility: CLINIC | Age: 80
End: 2020-01-01
Payer: COMMERCIAL

## 2020-01-01 ENCOUNTER — TELEPHONE (OUTPATIENT)
Dept: OTHER | Facility: OTHER | Age: 80
End: 2020-01-01

## 2020-01-01 ENCOUNTER — TELEPHONE (OUTPATIENT)
Dept: NEUROLOGY | Facility: CLINIC | Age: 80
End: 2020-01-01

## 2020-01-01 ENCOUNTER — OFFICE VISIT (OUTPATIENT)
Dept: OBGYN CLINIC | Facility: CLINIC | Age: 80
End: 2020-01-01
Payer: COMMERCIAL

## 2020-01-01 ENCOUNTER — TRANSCRIBE ORDERS (OUTPATIENT)
Dept: LAB | Facility: CLINIC | Age: 80
End: 2020-01-01

## 2020-01-01 ENCOUNTER — HOSPITAL ENCOUNTER (OUTPATIENT)
Facility: HOSPITAL | Age: 80
Setting detail: OUTPATIENT SURGERY
Discharge: HOME | End: 2020-05-21
Attending: UROLOGY | Admitting: UROLOGY
Payer: COMMERCIAL

## 2020-01-01 ENCOUNTER — OFFICE VISIT (OUTPATIENT)
Dept: CARDIOLOGY CLINIC | Facility: CLINIC | Age: 80
End: 2020-01-01
Payer: COMMERCIAL

## 2020-01-01 ENCOUNTER — TRANSCRIBE ORDERS (OUTPATIENT)
Dept: ADMINISTRATIVE | Facility: HOSPITAL | Age: 80
End: 2020-01-01

## 2020-01-01 ENCOUNTER — DOCUMENTATION (OUTPATIENT)
Dept: HEMATOLOGY ONCOLOGY | Facility: CLINIC | Age: 80
End: 2020-01-01

## 2020-01-01 ENCOUNTER — HOSPITAL ENCOUNTER (OUTPATIENT)
Dept: INFUSION CENTER | Facility: HOSPITAL | Age: 80
Discharge: HOME | End: 2020-09-22

## 2020-01-01 ENCOUNTER — APPOINTMENT (EMERGENCY)
Dept: RADIOLOGY | Facility: HOSPITAL | Age: 80
DRG: 683 | End: 2020-01-01
Payer: COMMERCIAL

## 2020-01-01 ENCOUNTER — HOSPITAL ENCOUNTER (OUTPATIENT)
Dept: INFUSION CENTER | Facility: HOSPITAL | Age: 80
Discharge: HOME | End: 2020-10-21
Payer: COMMERCIAL

## 2020-01-01 ENCOUNTER — HOSPITAL ENCOUNTER (OUTPATIENT)
Dept: INFUSION CENTER | Facility: HOSPITAL | Age: 80
Discharge: HOME | End: 2020-07-01
Payer: COMMERCIAL

## 2020-01-01 ENCOUNTER — HOSPITAL ENCOUNTER (INPATIENT)
Facility: HOSPITAL | Age: 80
LOS: 2 days | Discharge: HOME HEALTH | DRG: 872 | End: 2020-12-16
Attending: EMERGENCY MEDICINE | Admitting: INTERNAL MEDICINE
Payer: COMMERCIAL

## 2020-01-01 ENCOUNTER — HOSPITAL ENCOUNTER (INPATIENT)
Facility: HOSPITAL | Age: 80
LOS: 1 days | Discharge: HOME HEALTH | DRG: 149 | End: 2020-10-01
Attending: EMERGENCY MEDICINE | Admitting: GENERAL PRACTICE
Payer: COMMERCIAL

## 2020-01-01 ENCOUNTER — APPOINTMENT (OUTPATIENT)
Dept: RADIOLOGY | Facility: CLINIC | Age: 80
End: 2020-01-01
Payer: COMMERCIAL

## 2020-01-01 ENCOUNTER — APPOINTMENT (INPATIENT)
Dept: RADIOLOGY | Facility: HOSPITAL | Age: 80
DRG: 392 | End: 2020-01-01
Payer: COMMERCIAL

## 2020-01-01 ENCOUNTER — TELEPHONE (OUTPATIENT)
Dept: HEMATOLOGY ONCOLOGY | Facility: CLINIC | Age: 80
End: 2020-01-01

## 2020-01-01 ENCOUNTER — ANESTHESIA (OUTPATIENT)
Dept: PERIOP | Facility: HOSPITAL | Age: 80
End: 2020-01-01
Payer: COMMERCIAL

## 2020-01-01 ENCOUNTER — HOSPITAL ENCOUNTER (OUTPATIENT)
Dept: RADIOLOGY | Facility: HOSPITAL | Age: 80
Discharge: HOME | End: 2020-06-03
Payer: COMMERCIAL

## 2020-01-01 ENCOUNTER — HOSPITAL ENCOUNTER (OUTPATIENT)
Dept: INFUSION CENTER | Facility: HOSPITAL | Age: 80
Discharge: HOME | End: 2020-11-17
Attending: INTERNAL MEDICINE
Payer: COMMERCIAL

## 2020-01-01 ENCOUNTER — CLINICAL SUPPORT (OUTPATIENT)
Dept: CARDIOLOGY CLINIC | Facility: CLINIC | Age: 80
End: 2020-01-01
Payer: COMMERCIAL

## 2020-01-01 ENCOUNTER — APPOINTMENT (INPATIENT)
Dept: NON INVASIVE DIAGNOSTICS | Facility: HOSPITAL | Age: 80
DRG: 872 | End: 2020-01-01
Payer: COMMERCIAL

## 2020-01-01 ENCOUNTER — TELEPHONE (OUTPATIENT)
Dept: UROLOGY | Facility: CLINIC | Age: 80
End: 2020-01-01

## 2020-01-01 ENCOUNTER — HOSPITAL ENCOUNTER (OUTPATIENT)
Dept: INFUSION CENTER | Facility: HOSPITAL | Age: 80
Discharge: HOME | End: 2020-09-22
Payer: COMMERCIAL

## 2020-01-01 ENCOUNTER — TELEPHONE (OUTPATIENT)
Dept: ADMINISTRATIVE | Facility: OTHER | Age: 80
End: 2020-01-01

## 2020-01-01 ENCOUNTER — APPOINTMENT (EMERGENCY)
Dept: CT IMAGING | Facility: HOSPITAL | Age: 80
DRG: 149 | End: 2020-01-01
Payer: COMMERCIAL

## 2020-01-01 ENCOUNTER — OFFICE VISIT (OUTPATIENT)
Dept: PHYSICAL THERAPY | Facility: CLINIC | Age: 80
End: 2020-01-01
Payer: COMMERCIAL

## 2020-01-01 ENCOUNTER — ANESTHESIA EVENT (OUTPATIENT)
Dept: PERIOP | Facility: HOSPITAL | Age: 80
End: 2020-01-01
Payer: COMMERCIAL

## 2020-01-01 ENCOUNTER — HOSPITAL ENCOUNTER (OUTPATIENT)
Dept: INFUSION CENTER | Facility: HOSPITAL | Age: 80
Discharge: HOME | End: 2020-07-28
Payer: COMMERCIAL

## 2020-01-01 ENCOUNTER — CONSULT (OUTPATIENT)
Dept: FAMILY MEDICINE CLINIC | Facility: CLINIC | Age: 80
End: 2020-01-01
Payer: COMMERCIAL

## 2020-01-01 ENCOUNTER — HOSPITAL ENCOUNTER (OUTPATIENT)
Dept: ULTRASOUND IMAGING | Facility: HOSPITAL | Age: 80
Discharge: HOME | End: 2020-06-16
Attending: UROLOGY
Payer: COMMERCIAL

## 2020-01-01 ENCOUNTER — HOSPITAL ENCOUNTER (OUTPATIENT)
Dept: MRI IMAGING | Facility: HOSPITAL | Age: 80
Discharge: HOME | End: 2020-09-15
Payer: COMMERCIAL

## 2020-01-01 ENCOUNTER — APPOINTMENT (EMERGENCY)
Dept: CT IMAGING | Facility: HOSPITAL | Age: 80
DRG: 872 | End: 2020-01-01
Payer: COMMERCIAL

## 2020-01-01 ENCOUNTER — HOSPITAL ENCOUNTER (INPATIENT)
Facility: HOSPITAL | Age: 80
LOS: 5 days | Discharge: HOME HEALTH | DRG: 392 | End: 2020-08-11
Attending: EMERGENCY MEDICINE | Admitting: GENERAL PRACTICE
Payer: COMMERCIAL

## 2020-01-01 ENCOUNTER — HOSPITAL ENCOUNTER (OUTPATIENT)
Facility: HOSPITAL | Age: 80
Setting detail: OUTPATIENT SURGERY
Discharge: HOME | End: 2020-11-05
Attending: ANESTHESIOLOGY | Admitting: ANESTHESIOLOGY
Payer: COMMERCIAL

## 2020-01-01 VITALS
SYSTOLIC BLOOD PRESSURE: 140 MMHG | HEART RATE: 74 BPM | WEIGHT: 155 LBS | TEMPERATURE: 96.7 F | RESPIRATION RATE: 18 BRPM | DIASTOLIC BLOOD PRESSURE: 62 MMHG | HEIGHT: 68 IN | OXYGEN SATURATION: 98 % | BODY MASS INDEX: 23.49 KG/M2

## 2020-01-01 VITALS — RESPIRATION RATE: 18 BRPM | TEMPERATURE: 96.7 F

## 2020-01-01 VITALS
SYSTOLIC BLOOD PRESSURE: 121 MMHG | TEMPERATURE: 96.8 F | RESPIRATION RATE: 18 BRPM | DIASTOLIC BLOOD PRESSURE: 57 MMHG | HEART RATE: 85 BPM

## 2020-01-01 VITALS
HEIGHT: 68 IN | OXYGEN SATURATION: 98 % | HEART RATE: 70 BPM | BODY MASS INDEX: 25.42 KG/M2 | DIASTOLIC BLOOD PRESSURE: 98 MMHG | TEMPERATURE: 96.5 F | SYSTOLIC BLOOD PRESSURE: 138 MMHG | WEIGHT: 167.7 LBS | RESPIRATION RATE: 20 BRPM

## 2020-01-01 VITALS
TEMPERATURE: 98.2 F | BODY MASS INDEX: 25.42 KG/M2 | SYSTOLIC BLOOD PRESSURE: 138 MMHG | DIASTOLIC BLOOD PRESSURE: 64 MMHG | WEIGHT: 167.7 LBS | HEIGHT: 68 IN | OXYGEN SATURATION: 97 % | RESPIRATION RATE: 18 BRPM | HEART RATE: 80 BPM

## 2020-01-01 VITALS
HEART RATE: 91 BPM | DIASTOLIC BLOOD PRESSURE: 70 MMHG | HEIGHT: 67 IN | WEIGHT: 155 LBS | SYSTOLIC BLOOD PRESSURE: 138 MMHG | OXYGEN SATURATION: 99 % | TEMPERATURE: 97 F | BODY MASS INDEX: 24.33 KG/M2 | RESPIRATION RATE: 18 BRPM

## 2020-01-01 VITALS
DIASTOLIC BLOOD PRESSURE: 70 MMHG | BODY MASS INDEX: 24.55 KG/M2 | OXYGEN SATURATION: 100 % | TEMPERATURE: 97.9 F | SYSTOLIC BLOOD PRESSURE: 150 MMHG | WEIGHT: 162 LBS | HEIGHT: 68 IN | HEART RATE: 86 BPM

## 2020-01-01 VITALS
OXYGEN SATURATION: 97 % | SYSTOLIC BLOOD PRESSURE: 126 MMHG | HEIGHT: 68 IN | TEMPERATURE: 98.1 F | WEIGHT: 174.6 LBS | HEART RATE: 96 BPM | RESPIRATION RATE: 18 BRPM | DIASTOLIC BLOOD PRESSURE: 59 MMHG | BODY MASS INDEX: 26.46 KG/M2

## 2020-01-01 VITALS
BODY MASS INDEX: 27.4 KG/M2 | HEART RATE: 75 BPM | WEIGHT: 180.78 LBS | RESPIRATION RATE: 18 BRPM | SYSTOLIC BLOOD PRESSURE: 171 MMHG | HEIGHT: 68 IN | OXYGEN SATURATION: 97 % | TEMPERATURE: 97.4 F | DIASTOLIC BLOOD PRESSURE: 73 MMHG

## 2020-01-01 VITALS
TEMPERATURE: 98 F | RESPIRATION RATE: 18 BRPM | TEMPERATURE: 98.1 F | DIASTOLIC BLOOD PRESSURE: 75 MMHG | BODY MASS INDEX: 25.46 KG/M2 | OXYGEN SATURATION: 97 % | OXYGEN SATURATION: 95 % | HEART RATE: 76 BPM | HEIGHT: 68 IN | WEIGHT: 168 LBS | DIASTOLIC BLOOD PRESSURE: 64 MMHG | SYSTOLIC BLOOD PRESSURE: 167 MMHG | HEART RATE: 67 BPM | SYSTOLIC BLOOD PRESSURE: 134 MMHG

## 2020-01-01 VITALS
HEART RATE: 84 BPM | SYSTOLIC BLOOD PRESSURE: 129 MMHG | RESPIRATION RATE: 16 BRPM | OXYGEN SATURATION: 98 % | TEMPERATURE: 97.2 F | DIASTOLIC BLOOD PRESSURE: 62 MMHG

## 2020-01-01 VITALS
WEIGHT: 161 LBS | TEMPERATURE: 97.6 F | OXYGEN SATURATION: 100 % | HEIGHT: 68 IN | BODY MASS INDEX: 24.4 KG/M2 | DIASTOLIC BLOOD PRESSURE: 84 MMHG | SYSTOLIC BLOOD PRESSURE: 138 MMHG | HEART RATE: 94 BPM

## 2020-01-01 VITALS
HEIGHT: 68 IN | WEIGHT: 158.9 LBS | OXYGEN SATURATION: 98 % | HEART RATE: 101 BPM | RESPIRATION RATE: 18 BRPM | DIASTOLIC BLOOD PRESSURE: 72 MMHG | SYSTOLIC BLOOD PRESSURE: 144 MMHG | BODY MASS INDEX: 24.08 KG/M2 | TEMPERATURE: 96.9 F

## 2020-01-01 VITALS
OXYGEN SATURATION: 98 % | SYSTOLIC BLOOD PRESSURE: 153 MMHG | HEART RATE: 69 BPM | TEMPERATURE: 97.7 F | RESPIRATION RATE: 18 BRPM | DIASTOLIC BLOOD PRESSURE: 70 MMHG

## 2020-01-01 VITALS
HEART RATE: 87 BPM | SYSTOLIC BLOOD PRESSURE: 118 MMHG | WEIGHT: 167 LBS | HEIGHT: 68 IN | TEMPERATURE: 96.8 F | BODY MASS INDEX: 25.31 KG/M2 | OXYGEN SATURATION: 98 % | DIASTOLIC BLOOD PRESSURE: 62 MMHG

## 2020-01-01 VITALS
BODY MASS INDEX: 25.04 KG/M2 | DIASTOLIC BLOOD PRESSURE: 62 MMHG | OXYGEN SATURATION: 97 % | SYSTOLIC BLOOD PRESSURE: 134 MMHG | WEIGHT: 165.2 LBS | TEMPERATURE: 97.5 F | HEART RATE: 100 BPM | RESPIRATION RATE: 18 BRPM | HEIGHT: 68 IN

## 2020-01-01 VITALS
OXYGEN SATURATION: 98 % | TEMPERATURE: 98.1 F | HEART RATE: 78 BPM | SYSTOLIC BLOOD PRESSURE: 150 MMHG | BODY MASS INDEX: 21.82 KG/M2 | DIASTOLIC BLOOD PRESSURE: 70 MMHG | HEIGHT: 68 IN | WEIGHT: 144 LBS

## 2020-01-01 VITALS
HEART RATE: 75 BPM | RESPIRATION RATE: 18 BRPM | DIASTOLIC BLOOD PRESSURE: 79 MMHG | HEIGHT: 68 IN | WEIGHT: 166.9 LBS | OXYGEN SATURATION: 97 % | SYSTOLIC BLOOD PRESSURE: 178 MMHG | BODY MASS INDEX: 25.29 KG/M2 | TEMPERATURE: 97.9 F

## 2020-01-01 VITALS
RESPIRATION RATE: 20 BRPM | HEIGHT: 68 IN | BODY MASS INDEX: 24.26 KG/M2 | DIASTOLIC BLOOD PRESSURE: 71 MMHG | SYSTOLIC BLOOD PRESSURE: 141 MMHG | OXYGEN SATURATION: 98 % | HEART RATE: 70 BPM | WEIGHT: 160.05 LBS | TEMPERATURE: 97.7 F

## 2020-01-01 VITALS
SYSTOLIC BLOOD PRESSURE: 141 MMHG | BODY MASS INDEX: 23.62 KG/M2 | RESPIRATION RATE: 17 BRPM | OXYGEN SATURATION: 100 % | DIASTOLIC BLOOD PRESSURE: 68 MMHG | HEIGHT: 68 IN | WEIGHT: 155.87 LBS | HEART RATE: 79 BPM | TEMPERATURE: 97.1 F

## 2020-01-01 VITALS
HEIGHT: 68 IN | SYSTOLIC BLOOD PRESSURE: 136 MMHG | TEMPERATURE: 96.8 F | HEART RATE: 93 BPM | BODY MASS INDEX: 23.49 KG/M2 | WEIGHT: 155 LBS | OXYGEN SATURATION: 93 % | DIASTOLIC BLOOD PRESSURE: 72 MMHG

## 2020-01-01 VITALS
RESPIRATION RATE: 18 BRPM | SYSTOLIC BLOOD PRESSURE: 151 MMHG | DIASTOLIC BLOOD PRESSURE: 72 MMHG | HEART RATE: 103 BPM | TEMPERATURE: 96.8 F

## 2020-01-01 VITALS
DIASTOLIC BLOOD PRESSURE: 78 MMHG | HEIGHT: 68 IN | RESPIRATION RATE: 18 BRPM | SYSTOLIC BLOOD PRESSURE: 158 MMHG | BODY MASS INDEX: 22.43 KG/M2 | OXYGEN SATURATION: 95 % | WEIGHT: 163.58 LBS | HEART RATE: 96 BPM | OXYGEN SATURATION: 100 % | HEART RATE: 57 BPM | SYSTOLIC BLOOD PRESSURE: 100 MMHG | DIASTOLIC BLOOD PRESSURE: 60 MMHG | TEMPERATURE: 96.7 F | WEIGHT: 148 LBS | TEMPERATURE: 98.7 F | BODY MASS INDEX: 25.67 KG/M2 | HEIGHT: 67 IN

## 2020-01-01 VITALS
SYSTOLIC BLOOD PRESSURE: 120 MMHG | HEART RATE: 79 BPM | OXYGEN SATURATION: 97 % | BODY MASS INDEX: 25.31 KG/M2 | DIASTOLIC BLOOD PRESSURE: 62 MMHG | WEIGHT: 167 LBS | HEIGHT: 68 IN | TEMPERATURE: 98.1 F

## 2020-01-01 VITALS
BODY MASS INDEX: 25.16 KG/M2 | DIASTOLIC BLOOD PRESSURE: 76 MMHG | SYSTOLIC BLOOD PRESSURE: 140 MMHG | WEIGHT: 166 LBS | HEART RATE: 60 BPM | HEIGHT: 68 IN

## 2020-01-01 VITALS
HEIGHT: 68 IN | BODY MASS INDEX: 24.86 KG/M2 | DIASTOLIC BLOOD PRESSURE: 50 MMHG | WEIGHT: 164 LBS | HEART RATE: 70 BPM | SYSTOLIC BLOOD PRESSURE: 122 MMHG

## 2020-01-01 VITALS
RESPIRATION RATE: 18 BRPM | TEMPERATURE: 97.7 F | OXYGEN SATURATION: 97 % | DIASTOLIC BLOOD PRESSURE: 63 MMHG | SYSTOLIC BLOOD PRESSURE: 135 MMHG | HEART RATE: 69 BPM

## 2020-01-01 VITALS
RESPIRATION RATE: 20 BRPM | BODY MASS INDEX: 24.93 KG/M2 | DIASTOLIC BLOOD PRESSURE: 68 MMHG | TEMPERATURE: 97.4 F | SYSTOLIC BLOOD PRESSURE: 146 MMHG | WEIGHT: 164.5 LBS | HEIGHT: 68 IN | HEART RATE: 88 BPM

## 2020-01-01 VITALS — HEART RATE: 69 BPM

## 2020-01-01 VITALS
DIASTOLIC BLOOD PRESSURE: 50 MMHG | RESPIRATION RATE: 18 BRPM | SYSTOLIC BLOOD PRESSURE: 128 MMHG | TEMPERATURE: 96.8 F | OXYGEN SATURATION: 98 % | HEART RATE: 90 BPM

## 2020-01-01 VITALS
DIASTOLIC BLOOD PRESSURE: 80 MMHG | OXYGEN SATURATION: 99 % | SYSTOLIC BLOOD PRESSURE: 142 MMHG | TEMPERATURE: 96.7 F | HEART RATE: 88 BPM

## 2020-01-01 VITALS
BODY MASS INDEX: 23.34 KG/M2 | SYSTOLIC BLOOD PRESSURE: 132 MMHG | HEART RATE: 84 BPM | HEIGHT: 68 IN | TEMPERATURE: 96.9 F | WEIGHT: 154 LBS | DIASTOLIC BLOOD PRESSURE: 84 MMHG

## 2020-01-01 VITALS — DIASTOLIC BLOOD PRESSURE: 81 MMHG | TEMPERATURE: 97.6 F | SYSTOLIC BLOOD PRESSURE: 155 MMHG | HEART RATE: 91 BPM

## 2020-01-01 DIAGNOSIS — R11.2 NAUSEA AND VOMITING: ICD-10-CM

## 2020-01-01 DIAGNOSIS — N39.0 RECURRENT UTI: ICD-10-CM

## 2020-01-01 DIAGNOSIS — E83.42 HYPOMAGNESEMIA: Chronic | ICD-10-CM

## 2020-01-01 DIAGNOSIS — I12.0 HYPERTENSIVE KIDNEY DISEASE WITH STAGE 5 CHRONIC KIDNEY DISEASE, NOT ON CHRONIC DIALYSIS (HCC): ICD-10-CM

## 2020-01-01 DIAGNOSIS — Z79.4 TYPE 2 DIABETES MELLITUS WITH HYPERGLYCEMIA, WITH LONG-TERM CURRENT USE OF INSULIN (HCC): ICD-10-CM

## 2020-01-01 DIAGNOSIS — D64.9 NORMOCYTIC ANEMIA: ICD-10-CM

## 2020-01-01 DIAGNOSIS — E11.9 DIABETIC EYE EXAM (HCC): ICD-10-CM

## 2020-01-01 DIAGNOSIS — R42 DIZZINESS: ICD-10-CM

## 2020-01-01 DIAGNOSIS — E87.2 METABOLIC ACIDOSIS: ICD-10-CM

## 2020-01-01 DIAGNOSIS — E43 SEVERE PROTEIN-CALORIE MALNUTRITION (HCC): ICD-10-CM

## 2020-01-01 DIAGNOSIS — C7B.8 NEUROENDOCRINE CARCINOMA METASTATIC TO LIVER (HCC): Chronic | ICD-10-CM

## 2020-01-01 DIAGNOSIS — D3A.8 NEUROENDOCRINE TUMOR: ICD-10-CM

## 2020-01-01 DIAGNOSIS — C7A.8 NEUROENDOCRINE CARCINOMA METASTATIC TO LIVER (HCC): Primary | ICD-10-CM

## 2020-01-01 DIAGNOSIS — E87.6 ACUTE HYPOKALEMIA: Chronic | ICD-10-CM

## 2020-01-01 DIAGNOSIS — I10 ESSENTIAL HYPERTENSION: ICD-10-CM

## 2020-01-01 DIAGNOSIS — C7A.8 MALIGNANT NEUROENDOCRINE NEOPLASM (HCC): Primary | ICD-10-CM

## 2020-01-01 DIAGNOSIS — E11.649 TYPE 2 DIABETES MELLITUS WITH HYPOGLYCEMIA WITHOUT COMA, WITH LONG-TERM CURRENT USE OF INSULIN (HCC): ICD-10-CM

## 2020-01-01 DIAGNOSIS — C7B.8 NEUROENDOCRINE CARCINOMA METASTATIC TO LIVER (HCC): ICD-10-CM

## 2020-01-01 DIAGNOSIS — C7A.8 MALIGNANT NEUROENDOCRINE NEOPLASM (HCC): ICD-10-CM

## 2020-01-01 DIAGNOSIS — N18.9 CKD (CHRONIC KIDNEY DISEASE): ICD-10-CM

## 2020-01-01 DIAGNOSIS — C7A.8 NEUROENDOCRINE CARCINOMA METASTATIC TO LIVER (HCC): ICD-10-CM

## 2020-01-01 DIAGNOSIS — N17.9 ACUTE KIDNEY INJURY SUPERIMPOSED ON CHRONIC KIDNEY DISEASE (HCC): Chronic | ICD-10-CM

## 2020-01-01 DIAGNOSIS — Z01.818 PRE-OP EXAMINATION: Primary | ICD-10-CM

## 2020-01-01 DIAGNOSIS — E78.2 MIXED HYPERLIPIDEMIA: Chronic | ICD-10-CM

## 2020-01-01 DIAGNOSIS — N20.1 URETERAL STONE: ICD-10-CM

## 2020-01-01 DIAGNOSIS — E34.0 CARCINOID SYNDROME (HCC): ICD-10-CM

## 2020-01-01 DIAGNOSIS — S42.215D CLOSED NONDISPLACED FRACTURE OF SURGICAL NECK OF LEFT HUMERUS WITH ROUTINE HEALING, UNSPECIFIED FRACTURE MORPHOLOGY, SUBSEQUENT ENCOUNTER: Primary | ICD-10-CM

## 2020-01-01 DIAGNOSIS — Z71.89 COMPLEX CARE COORDINATION: Primary | ICD-10-CM

## 2020-01-01 DIAGNOSIS — E11.65 TYPE 2 DIABETES MELLITUS WITH HYPERGLYCEMIA, WITH LONG-TERM CURRENT USE OF INSULIN (HCC): ICD-10-CM

## 2020-01-01 DIAGNOSIS — D63.1 ANEMIA OF CHRONIC RENAL FAILURE, STAGE 5 (HCC): ICD-10-CM

## 2020-01-01 DIAGNOSIS — Z79.4 TYPE 2 DIABETES MELLITUS WITH HYPOGLYCEMIA WITHOUT COMA, WITH LONG-TERM CURRENT USE OF INSULIN (HCC): ICD-10-CM

## 2020-01-01 DIAGNOSIS — N18.4 HYPERTENSIVE KIDNEY DISEASE WITH STAGE 4 CHRONIC KIDNEY DISEASE (HCC): ICD-10-CM

## 2020-01-01 DIAGNOSIS — E87.2 ACIDOSIS: ICD-10-CM

## 2020-01-01 DIAGNOSIS — E34.0 CARCINOID SYNDROME (HCC): Primary | ICD-10-CM

## 2020-01-01 DIAGNOSIS — N18.4 STAGE 4 CHRONIC KIDNEY DISEASE (HCC): Primary | ICD-10-CM

## 2020-01-01 DIAGNOSIS — R11.2 NAUSEA AND VOMITING, INTRACTABILITY OF VOMITING NOT SPECIFIED, UNSPECIFIED VOMITING TYPE: ICD-10-CM

## 2020-01-01 DIAGNOSIS — R26.9 GAIT DIFFICULTY: ICD-10-CM

## 2020-01-01 DIAGNOSIS — N17.9 ACUTE RENAL FAILURE SUPERIMPOSED ON STAGE 4 CHRONIC KIDNEY DISEASE, UNSPECIFIED ACUTE RENAL FAILURE TYPE (HCC): ICD-10-CM

## 2020-01-01 DIAGNOSIS — R60.9 EDEMA, UNSPECIFIED TYPE: ICD-10-CM

## 2020-01-01 DIAGNOSIS — Z00.00 ENCOUNTER FOR MEDICARE ANNUAL WELLNESS EXAM: ICD-10-CM

## 2020-01-01 DIAGNOSIS — Z76.89 ENCOUNTER FOR SUPPORT AND COORDINATION OF TRANSITION OF CARE: Primary | ICD-10-CM

## 2020-01-01 DIAGNOSIS — M25.512 ACUTE PAIN OF LEFT SHOULDER: ICD-10-CM

## 2020-01-01 DIAGNOSIS — C7A.8 NEUROENDOCRINE CARCINOMA METASTATIC TO LIVER (HCC): Chronic | ICD-10-CM

## 2020-01-01 DIAGNOSIS — C7B.8 NEUROENDOCRINE CARCINOMA METASTATIC TO LIVER (HCC): Primary | ICD-10-CM

## 2020-01-01 DIAGNOSIS — N18.5 CKD (CHRONIC KIDNEY DISEASE) STAGE 5, GFR LESS THAN 15 ML/MIN (HCC): ICD-10-CM

## 2020-01-01 DIAGNOSIS — R55 SYNCOPE, UNSPECIFIED SYNCOPE TYPE: ICD-10-CM

## 2020-01-01 DIAGNOSIS — M25.512 LEFT SHOULDER PAIN, UNSPECIFIED CHRONICITY: ICD-10-CM

## 2020-01-01 DIAGNOSIS — N20.1 URETEROLITHIASIS: Primary | ICD-10-CM

## 2020-01-01 DIAGNOSIS — N25.81 SECONDARY HYPERPARATHYROIDISM OF RENAL ORIGIN (HCC): ICD-10-CM

## 2020-01-01 DIAGNOSIS — E21.0 HYPERPARATHYROID BONE DISEASE (HCC): ICD-10-CM

## 2020-01-01 DIAGNOSIS — I25.10 CORONARY ARTERY DISEASE INVOLVING NATIVE CORONARY ARTERY OF NATIVE HEART WITHOUT ANGINA PECTORIS: ICD-10-CM

## 2020-01-01 DIAGNOSIS — Z76.89 ENCOUNTER TO ESTABLISH CARE WITH NEW DOCTOR: Primary | ICD-10-CM

## 2020-01-01 DIAGNOSIS — I12.9 HYPERTENSIVE KIDNEY DISEASE WITH STAGE 4 CHRONIC KIDNEY DISEASE (HCC): ICD-10-CM

## 2020-01-01 DIAGNOSIS — N17.9 ACUTE RENAL FAILURE SUPERIMPOSED ON STAGE 4 CHRONIC KIDNEY DISEASE (HCC): ICD-10-CM

## 2020-01-01 DIAGNOSIS — N18.4 ACUTE RENAL FAILURE SUPERIMPOSED ON STAGE 4 CHRONIC KIDNEY DISEASE, UNSPECIFIED ACUTE RENAL FAILURE TYPE (HCC): ICD-10-CM

## 2020-01-01 DIAGNOSIS — L89.610 PRESSURE INJURY OF RIGHT HEEL, UNSTAGEABLE (HCC): ICD-10-CM

## 2020-01-01 DIAGNOSIS — R65.10 SIRS (SYSTEMIC INFLAMMATORY RESPONSE SYNDROME) (HCC): ICD-10-CM

## 2020-01-01 DIAGNOSIS — E83.42 HYPOMAGNESEMIA: ICD-10-CM

## 2020-01-01 DIAGNOSIS — N25.81 SECONDARY HYPERPARATHYROIDISM (HCC): ICD-10-CM

## 2020-01-01 DIAGNOSIS — R10.9 ABDOMINAL PAIN: ICD-10-CM

## 2020-01-01 DIAGNOSIS — I51.89 DIASTOLIC DYSFUNCTION: ICD-10-CM
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
## 2020-01-01 DIAGNOSIS — R42 DIZZINESS: Primary | ICD-10-CM

## 2020-01-01 DIAGNOSIS — R55 SYNCOPE: Primary | ICD-10-CM

## 2020-01-01 DIAGNOSIS — R79.89 POSITIVE D DIMER: ICD-10-CM

## 2020-01-01 DIAGNOSIS — I25.10 CORONARY ARTERY DISEASE INVOLVING NATIVE CORONARY ARTERY OF NATIVE HEART WITHOUT ANGINA PECTORIS: Primary | ICD-10-CM

## 2020-01-01 DIAGNOSIS — M10.9 GOUT, UNSPECIFIED CAUSE, UNSPECIFIED CHRONICITY, UNSPECIFIED SITE: ICD-10-CM

## 2020-01-01 DIAGNOSIS — N17.9 AKI (ACUTE KIDNEY INJURY) (HCC): ICD-10-CM

## 2020-01-01 DIAGNOSIS — N30.00 ACUTE CYSTITIS WITHOUT HEMATURIA: ICD-10-CM

## 2020-01-01 DIAGNOSIS — N40.0 HYPERPLASIA OF PROSTATE: ICD-10-CM

## 2020-01-01 DIAGNOSIS — N18.9 ACUTE KIDNEY INJURY SUPERIMPOSED ON CHRONIC KIDNEY DISEASE (HCC): Chronic | ICD-10-CM

## 2020-01-01 DIAGNOSIS — H25.013 CORTICAL AGE-RELATED CATARACT OF BOTH EYES: ICD-10-CM

## 2020-01-01 DIAGNOSIS — N20.1 CALCULUS OF URETER: Primary | ICD-10-CM
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
## 2020-01-01 DIAGNOSIS — Z96.0 URINARY CATHETER IN PLACE: Chronic | ICD-10-CM

## 2020-01-01 DIAGNOSIS — R26.89 IMBALANCE: ICD-10-CM

## 2020-01-01 DIAGNOSIS — K21.9 GASTROESOPHAGEAL REFLUX DISEASE WITHOUT ESOPHAGITIS: ICD-10-CM

## 2020-01-01 DIAGNOSIS — N18.4 STAGE 4 CHRONIC KIDNEY DISEASE (HCC): ICD-10-CM

## 2020-01-01 DIAGNOSIS — S46.012S ROTATOR CUFF STRAIN, LEFT, SEQUELA: ICD-10-CM

## 2020-01-01 DIAGNOSIS — Z13.31 DEPRESSION SCREENING NEGATIVE: ICD-10-CM

## 2020-01-01 DIAGNOSIS — R55 SYNCOPE AND COLLAPSE: Chronic | ICD-10-CM

## 2020-01-01 DIAGNOSIS — N20.1 CALCULUS OF URETER: ICD-10-CM

## 2020-01-01 DIAGNOSIS — D50.8 IRON DEFICIENCY ANEMIA SECONDARY TO INADEQUATE DIETARY IRON INTAKE: ICD-10-CM

## 2020-01-01 DIAGNOSIS — Z96.0 URINARY CATHETER IN PLACE: ICD-10-CM

## 2020-01-01 DIAGNOSIS — I10 ESSENTIAL HYPERTENSION: Primary | ICD-10-CM

## 2020-01-01 DIAGNOSIS — A04.72 C. DIFFICILE DIARRHEA: ICD-10-CM

## 2020-01-01 DIAGNOSIS — N18.4 ACUTE RENAL FAILURE SUPERIMPOSED ON STAGE 4 CHRONIC KIDNEY DISEASE (HCC): ICD-10-CM

## 2020-01-01 DIAGNOSIS — R13.19 ESOPHAGEAL DYSPHAGIA: ICD-10-CM

## 2020-01-01 DIAGNOSIS — N18.5 CKD (CHRONIC KIDNEY DISEASE) STAGE 5, GFR LESS THAN 15 ML/MIN (HCC): Primary | ICD-10-CM

## 2020-01-01 DIAGNOSIS — R07.81 RIB PAIN ON LEFT SIDE: ICD-10-CM

## 2020-01-01 DIAGNOSIS — I48.91 ATRIAL FIBRILLATION (HCC): ICD-10-CM

## 2020-01-01 DIAGNOSIS — R33.9 URINARY RETENTION: ICD-10-CM

## 2020-01-01 DIAGNOSIS — R42 VERTIGO: ICD-10-CM

## 2020-01-01 DIAGNOSIS — D3A.8 NEUROENDOCRINE TUMOR: Chronic | ICD-10-CM

## 2020-01-01 DIAGNOSIS — Z23 NEED FOR INFLUENZA VACCINATION: ICD-10-CM

## 2020-01-01 DIAGNOSIS — R53.1 GENERALIZED WEAKNESS: ICD-10-CM

## 2020-01-01 DIAGNOSIS — R55 SYNCOPE AND COLLAPSE: Primary | Chronic | ICD-10-CM

## 2020-01-01 DIAGNOSIS — Z01.00 DIABETIC EYE EXAM (HCC): ICD-10-CM

## 2020-01-01 DIAGNOSIS — M1A.3620 CHRONIC GOUT OF LEFT KNEE DUE TO RENAL IMPAIRMENT WITHOUT TOPHUS: ICD-10-CM

## 2020-01-01 DIAGNOSIS — R55 SYNCOPE, UNSPECIFIED SYNCOPE TYPE: Primary | ICD-10-CM

## 2020-01-01 DIAGNOSIS — I49.8 VENTRICULAR BIGEMINY: ICD-10-CM

## 2020-01-01 DIAGNOSIS — R07.9 CHEST PAIN, UNSPECIFIED TYPE: ICD-10-CM

## 2020-01-01 DIAGNOSIS — N18.5 ANEMIA OF CHRONIC RENAL FAILURE, STAGE 5 (HCC): ICD-10-CM

## 2020-01-01 DIAGNOSIS — R07.9 CHEST PAIN: Primary | ICD-10-CM

## 2020-01-01 DIAGNOSIS — S42.215D CLOSED NONDISPLACED FRACTURE OF SURGICAL NECK OF LEFT HUMERUS WITH ROUTINE HEALING, UNSPECIFIED FRACTURE MORPHOLOGY, SUBSEQUENT ENCOUNTER: ICD-10-CM

## 2020-01-01 DIAGNOSIS — R79.89 LOW TSH LEVEL: ICD-10-CM

## 2020-01-01 DIAGNOSIS — N13.9 OBSTRUCTIVE UROPATHY: ICD-10-CM

## 2020-01-01 DIAGNOSIS — N18.5 HYPERTENSIVE KIDNEY DISEASE WITH STAGE 5 CHRONIC KIDNEY DISEASE, NOT ON CHRONIC DIALYSIS (HCC): ICD-10-CM

## 2020-01-01 DIAGNOSIS — I51.9 CARDIAC DISEASE: Chronic | ICD-10-CM

## 2020-01-01 DIAGNOSIS — Z97.8 CHRONIC INDWELLING FOLEY CATHETER: Chronic | ICD-10-CM

## 2020-01-01 LAB
ALBUMIN SERPL BCP-MCNC: 2.5 G/DL (ref 3.5–5)
ALBUMIN SERPL BCP-MCNC: 2.7 G/DL (ref 3.5–5)
ALBUMIN SERPL BCP-MCNC: 3.1 G/DL (ref 3.5–5)
ALBUMIN SERPL BCP-MCNC: 3.2 G/DL (ref 3.5–5)
ALBUMIN SERPL BCP-MCNC: 3.2 G/DL (ref 3.5–5)
ALBUMIN SERPL BCP-MCNC: 3.5 G/DL (ref 3.5–5)
ALBUMIN SERPL BCP-MCNC: 3.6 G/DL (ref 3.5–5)
ALBUMIN SERPL BCP-MCNC: 3.7 G/DL (ref 3.5–5)
ALBUMIN SERPL BCP-MCNC: 4 G/DL (ref 3.5–5)
ALBUMIN SERPL BCP-MCNC: 4.1 G/DL (ref 3.5–5.7)
ALP SERPL-CCNC: 105 U/L (ref 46–116)
ALP SERPL-CCNC: 114 U/L (ref 55–165)
ALP SERPL-CCNC: 116 U/L (ref 46–116)
ALP SERPL-CCNC: 119 U/L (ref 46–116)
ALP SERPL-CCNC: 121 U/L (ref 46–116)
ALP SERPL-CCNC: 122 U/L (ref 46–116)
ALP SERPL-CCNC: 126 U/L (ref 46–116)
ALP SERPL-CCNC: 144 U/L (ref 46–116)
ALP SERPL-CCNC: 154 U/L (ref 46–116)
ALP SERPL-CCNC: 155 U/L (ref 46–116)
ALP SERPL-CCNC: 156 U/L (ref 46–116)
ALP SERPL-CCNC: 156 U/L (ref 46–116)
ALT SERPL W P-5'-P-CCNC: 18 U/L (ref 12–78)
ALT SERPL W P-5'-P-CCNC: 21 U/L (ref 12–78)
ALT SERPL W P-5'-P-CCNC: 21 U/L (ref 12–78)
ALT SERPL W P-5'-P-CCNC: 23 U/L (ref 12–78)
ALT SERPL W P-5'-P-CCNC: 24 U/L (ref 12–78)
ALT SERPL W P-5'-P-CCNC: 24 U/L (ref 12–78)
ALT SERPL W P-5'-P-CCNC: 25 U/L (ref 12–78)
ALT SERPL W P-5'-P-CCNC: 26 U/L (ref 12–78)
ALT SERPL W P-5'-P-CCNC: 26 U/L (ref 7–52)
ALT SERPL W P-5'-P-CCNC: 29 U/L (ref 12–78)
ALT SERPL W P-5'-P-CCNC: 31 U/L (ref 12–78)
ALT SERPL W P-5'-P-CCNC: 33 U/L (ref 12–78)
AMORPH URATE CRY URNS QL MICRO: ABNORMAL /HPF
ANION GAP SERPL CALCULATED.3IONS-SCNC: 10 MMOL/L (ref 4–13)
ANION GAP SERPL CALCULATED.3IONS-SCNC: 10 MMOL/L (ref 4–13)
ANION GAP SERPL CALCULATED.3IONS-SCNC: 11 MMOL/L (ref 4–13)
ANION GAP SERPL CALCULATED.3IONS-SCNC: 12 MMOL/L (ref 4–13)
ANION GAP SERPL CALCULATED.3IONS-SCNC: 12 MMOL/L (ref 4–13)
ANION GAP SERPL CALCULATED.3IONS-SCNC: 13 MMOL/L (ref 4–13)
ANION GAP SERPL CALCULATED.3IONS-SCNC: 14 MMOL/L (ref 4–13)
ANION GAP SERPL CALCULATED.3IONS-SCNC: 14 MMOL/L (ref 4–13)
ANION GAP SERPL CALCULATED.3IONS-SCNC: 15 MMOL/L (ref 4–13)
ANION GAP SERPL CALCULATED.3IONS-SCNC: 17 MMOL/L (ref 4–13)
ANION GAP SERPL CALCULATED.3IONS-SCNC: 5 MMOL/L (ref 4–13)
ANION GAP SERPL CALCULATED.3IONS-SCNC: 7 MMOL/L (ref 4–13)
ANION GAP SERPL CALCULATED.3IONS-SCNC: 7 MMOL/L (ref 4–13)
ANION GAP SERPL CALCULATED.3IONS-SCNC: 8 MMOL/L (ref 4–13)
ANION GAP SERPL CALCULATED.3IONS-SCNC: 9 MMOL/L (ref 4–13)
APTT PPP: 29 SECONDS (ref 23–37)
APTT PPP: 83 SECONDS (ref 23–37)
AST SERPL W P-5'-P-CCNC: 12 U/L (ref 5–45)
AST SERPL W P-5'-P-CCNC: 14 U/L (ref 5–45)
AST SERPL W P-5'-P-CCNC: 15 U/L (ref 5–45)
AST SERPL W P-5'-P-CCNC: 16 U/L (ref 5–45)
AST SERPL W P-5'-P-CCNC: 16 U/L (ref 5–45)
AST SERPL W P-5'-P-CCNC: 19 U/L (ref 5–45)
AST SERPL W P-5'-P-CCNC: 19 U/L (ref 5–45)
AST SERPL W P-5'-P-CCNC: 20 U/L (ref 13–39)
AST SERPL W P-5'-P-CCNC: 20 U/L (ref 5–45)
AST SERPL W P-5'-P-CCNC: 20 U/L (ref 5–45)
ATRIAL RATE: 107 BPM
ATRIAL RATE: 80 BPM
ATRIAL RATE: 81 BPM
ATRIAL RATE: 82 BPM
ATRIAL RATE: 83 BPM
ATRIAL RATE: 85 BPM
ATRIAL RATE: 88 BPM
ATRIAL RATE: 88 BPM
ATRIAL RATE: 92 BPM
ATRIAL RATE: 94 BPM
ATRIAL RATE: 94 BPM
ATRIAL RATE: 95 BPM
ATRIAL RATE: 96 BPM
BACTERIA BLD CULT: NORMAL
BACTERIA BLD CULT: NORMAL
BACTERIA UR CULT: ABNORMAL
BACTERIA UR CULT: NORMAL
BACTERIA UR QL AUTO: ABNORMAL /HPF
BACTERIA UR QL AUTO: ABNORMAL /HPF
BASOPHILS # BLD AUTO: 0.05 THOUSANDS/ΜL (ref 0–0.1)
BASOPHILS # BLD AUTO: 0.06 THOUSANDS/ΜL (ref 0–0.1)
BASOPHILS # BLD AUTO: 0.06 THOUSANDS/ΜL (ref 0–0.1)
BASOPHILS # BLD AUTO: 0.07 THOUSANDS/ΜL (ref 0–0.1)
BASOPHILS # BLD AUTO: 0.08 THOUSANDS/ΜL (ref 0–0.1)
BASOPHILS # BLD AUTO: 0.08 THOUSANDS/ΜL (ref 0–0.1)
BASOPHILS # BLD AUTO: 0.09 THOUSANDS/ΜL (ref 0–0.1)
BASOPHILS # BLD AUTO: 0.1 THOUSANDS/ΜL (ref 0–0.1)
BASOPHILS # BLD AUTO: 0.11 THOUSANDS/ΜL (ref 0–0.1)
BASOPHILS # BLD AUTO: 0.11 THOUSANDS/ΜL (ref 0–0.1)
BASOPHILS # BLD AUTO: 0.13 THOUSANDS/ΜL (ref 0–0.1)
BASOPHILS # BLD AUTO: 0.2 THOUSANDS/ΜL (ref 0–0.1)
BASOPHILS NFR BLD AUTO: 1 % (ref 0–1)
BASOPHILS NFR BLD AUTO: 1 % (ref 0–2)
BASOPHILS NFR BLD AUTO: 1 % (ref 0–2)
BILIRUB DIRECT SERPL-MCNC: 0.13 MG/DL (ref 0–0.2)
BILIRUB SERPL-MCNC: 0.4 MG/DL (ref 0.2–1)
BILIRUB SERPL-MCNC: 0.5 MG/DL (ref 0.2–1)
BILIRUB SERPL-MCNC: 0.57 MG/DL (ref 0.2–1)
BILIRUB SERPL-MCNC: 0.6 MG/DL (ref 0.2–1)
BILIRUB SERPL-MCNC: 0.63 MG/DL (ref 0.2–1)
BILIRUB SERPL-MCNC: 0.65 MG/DL (ref 0.2–1)
BILIRUB SERPL-MCNC: 0.67 MG/DL (ref 0.2–1)
BILIRUB SERPL-MCNC: 0.72 MG/DL (ref 0.2–1)
BILIRUB UR QL STRIP: NEGATIVE
BILIRUB UR QL STRIP: NEGATIVE
BNP SERPL-MCNC: 139 PG/ML (ref 1–100)
BUN SERPL-MCNC: 42 MG/DL (ref 5–25)
BUN SERPL-MCNC: 42 MG/DL (ref 5–25)
BUN SERPL-MCNC: 44 MG/DL (ref 5–25)
BUN SERPL-MCNC: 44 MG/DL (ref 5–25)
BUN SERPL-MCNC: 44 MG/DL (ref 7–25)
BUN SERPL-MCNC: 45 MG/DL (ref 5–25)
BUN SERPL-MCNC: 45 MG/DL (ref 5–25)
BUN SERPL-MCNC: 46 MG/DL (ref 5–25)
BUN SERPL-MCNC: 46 MG/DL (ref 7–25)
BUN SERPL-MCNC: 47 MG/DL (ref 5–25)
BUN SERPL-MCNC: 51 MG/DL (ref 5–25)
BUN SERPL-MCNC: 51 MG/DL (ref 7–25)
BUN SERPL-MCNC: 52 MG/DL (ref 5–25)
BUN SERPL-MCNC: 53 MG/DL (ref 5–25)
BUN SERPL-MCNC: 55 MG/DL (ref 5–25)
BUN SERPL-MCNC: 55 MG/DL (ref 7–25)
BUN SERPL-MCNC: 56 MG/DL (ref 5–25)
BUN SERPL-MCNC: 57 MG/DL (ref 5–25)
BUN SERPL-MCNC: 58 MG/DL (ref 5–25)
BUN SERPL-MCNC: 63 MG/DL (ref 5–25)
BUN SERPL-MCNC: 65 MG/DL (ref 5–25)
BUN SERPL-MCNC: 66 MG/DL (ref 5–25)
BUN SERPL-MCNC: 73 MG/DL (ref 5–25)
BUN SERPL-MCNC: 81 MG/DL (ref 5–25)
C DIFF TOX A+B STL QL IA: NEGATIVE
C DIFF TOX B TCDB STL QL NAA+PROBE: POSITIVE
CALCIUM SERPL-MCNC: 8.1 MG/DL (ref 8.3–10.1)
CALCIUM SERPL-MCNC: 8.2 MG/DL (ref 8.3–10.1)
CALCIUM SERPL-MCNC: 8.2 MG/DL (ref 8.3–10.1)
CALCIUM SERPL-MCNC: 8.3 MG/DL (ref 8.3–10.1)
CALCIUM SERPL-MCNC: 8.4 MG/DL (ref 8.3–10.1)
CALCIUM SERPL-MCNC: 8.5 MG/DL (ref 8.3–10.1)
CALCIUM SERPL-MCNC: 8.5 MG/DL (ref 8.6–10.5)
CALCIUM SERPL-MCNC: 8.6 MG/DL (ref 8.3–10.1)
CALCIUM SERPL-MCNC: 8.6 MG/DL (ref 8.6–10.5)
CALCIUM SERPL-MCNC: 8.9 MG/DL (ref 8.3–10.1)
CALCIUM SERPL-MCNC: 8.9 MG/DL (ref 8.3–10.1)
CALCIUM SERPL-MCNC: 8.9 MG/DL (ref 8.6–10.5)
CALCIUM SERPL-MCNC: 9 MG/DL (ref 8.3–10.1)
CALCIUM SERPL-MCNC: 9 MG/DL (ref 8.6–10.5)
CALCIUM SERPL-MCNC: 9.1 MG/DL (ref 8.3–10.1)
CALCIUM SERPL-MCNC: 9.1 MG/DL (ref 8.3–10.1)
CALCIUM SERPL-MCNC: 9.3 MG/DL (ref 8.3–10.1)
CALCIUM SERPL-MCNC: 9.3 MG/DL (ref 8.3–10.1)
CALCIUM SERPL-MCNC: 9.4 MG/DL (ref 8.3–10.1)
CAMPYLOBACTER DNA SPEC NAA+PROBE: NORMAL
CGA SERPL-MCNC: 1191 NG/ML (ref 0–101.8)
CGA SERPL-MCNC: 1261 NG/ML (ref 0–101.8)
CGA SERPL-MCNC: 1296 NG/ML (ref 0–101.8)
CGA SERPL-MCNC: 1567 NG/ML (ref 0–101.8)
CGA SERPL-MCNC: 1670 NG/ML (ref 0–101.8)
CHLORIDE SERPL-SCNC: 100 MMOL/L (ref 100–108)
CHLORIDE SERPL-SCNC: 101 MMOL/L (ref 100–108)
CHLORIDE SERPL-SCNC: 102 MMOL/L (ref 100–108)
CHLORIDE SERPL-SCNC: 102 MMOL/L (ref 100–108)
CHLORIDE SERPL-SCNC: 103 MMOL/L (ref 100–108)
CHLORIDE SERPL-SCNC: 103 MMOL/L (ref 98–107)
CHLORIDE SERPL-SCNC: 104 MMOL/L (ref 100–108)
CHLORIDE SERPL-SCNC: 104 MMOL/L (ref 98–107)
CHLORIDE SERPL-SCNC: 105 MMOL/L (ref 100–108)
CHLORIDE SERPL-SCNC: 105 MMOL/L (ref 100–108)
CHLORIDE SERPL-SCNC: 106 MMOL/L (ref 100–108)
CHLORIDE SERPL-SCNC: 106 MMOL/L (ref 100–108)
CHLORIDE SERPL-SCNC: 107 MMOL/L (ref 98–107)
CHLORIDE SERPL-SCNC: 107 MMOL/L (ref 98–107)
CHLORIDE SERPL-SCNC: 108 MMOL/L (ref 100–108)
CHLORIDE SERPL-SCNC: 108 MMOL/L (ref 100–108)
CHLORIDE SERPL-SCNC: 109 MMOL/L (ref 100–108)
CHLORIDE SERPL-SCNC: 110 MMOL/L (ref 100–108)
CHLORIDE SERPL-SCNC: 110 MMOL/L (ref 100–108)
CHLORIDE SERPL-SCNC: 111 MMOL/L (ref 100–108)
CHLORIDE SERPL-SCNC: 98 MMOL/L (ref 100–108)
CHOLEST SERPL-MCNC: 135 MG/DL (ref 50–200)
CLARITY UR: ABNORMAL
CLARITY UR: ABNORMAL
CO2 SERPL-SCNC: 15 MMOL/L (ref 21–32)
CO2 SERPL-SCNC: 16 MMOL/L (ref 21–32)
CO2 SERPL-SCNC: 17 MMOL/L (ref 21–31)
CO2 SERPL-SCNC: 17 MMOL/L (ref 21–32)
CO2 SERPL-SCNC: 17 MMOL/L (ref 21–32)
CO2 SERPL-SCNC: 18 MMOL/L (ref 21–32)
CO2 SERPL-SCNC: 24 MMOL/L (ref 21–31)
CO2 SERPL-SCNC: 24 MMOL/L (ref 21–32)
CO2 SERPL-SCNC: 24 MMOL/L (ref 21–32)
CO2 SERPL-SCNC: 25 MMOL/L (ref 21–31)
CO2 SERPL-SCNC: 25 MMOL/L (ref 21–32)
CO2 SERPL-SCNC: 26 MMOL/L (ref 21–31)
CO2 SERPL-SCNC: 26 MMOL/L (ref 21–32)
CO2 SERPL-SCNC: 26 MMOL/L (ref 21–32)
CO2 SERPL-SCNC: 27 MMOL/L (ref 21–32)
CO2 SERPL-SCNC: 27 MMOL/L (ref 21–32)
CO2 SERPL-SCNC: 28 MMOL/L (ref 21–32)
CO2 SERPL-SCNC: 29 MMOL/L (ref 21–32)
CO2 SERPL-SCNC: 30 MMOL/L (ref 21–32)
CO2 SERPL-SCNC: 30 MMOL/L (ref 21–32)
CO2 SERPL-SCNC: 33 MMOL/L (ref 21–32)
COLOR UR: YELLOW
COLOR UR: YELLOW
CREAT SERPL-MCNC: 3.56 MG/DL (ref 0.6–1.3)
CREAT SERPL-MCNC: 3.58 MG/DL (ref 0.6–1.3)
CREAT SERPL-MCNC: 3.59 MG/DL (ref 0.6–1.3)
CREAT SERPL-MCNC: 3.62 MG/DL (ref 0.6–1.3)
CREAT SERPL-MCNC: 3.66 MG/DL (ref 0.6–1.3)
CREAT SERPL-MCNC: 3.79 MG/DL (ref 0.6–1.3)
CREAT SERPL-MCNC: 3.88 MG/DL (ref 0.6–1.3)
CREAT SERPL-MCNC: 3.92 MG/DL (ref 0.7–1.3)
CREAT SERPL-MCNC: 3.96 MG/DL (ref 0.6–1.3)
CREAT SERPL-MCNC: 4 MG/DL (ref 0.6–1.3)
CREAT SERPL-MCNC: 4.06 MG/DL (ref 0.6–1.3)
CREAT SERPL-MCNC: 4.15 MG/DL (ref 0.6–1.3)
CREAT SERPL-MCNC: 4.2 MG/DL (ref 0.6–1.3)
CREAT SERPL-MCNC: 4.23 MG/DL (ref 0.6–1.3)
CREAT SERPL-MCNC: 4.33 MG/DL (ref 0.6–1.3)
CREAT SERPL-MCNC: 4.36 MG/DL (ref 0.6–1.3)
CREAT SERPL-MCNC: 4.38 MG/DL (ref 0.7–1.3)
CREAT SERPL-MCNC: 4.4 MG/DL (ref 0.6–1.3)
CREAT SERPL-MCNC: 4.43 MG/DL (ref 0.6–1.3)
CREAT SERPL-MCNC: 4.46 MG/DL (ref 0.6–1.3)
CREAT SERPL-MCNC: 4.48 MG/DL (ref 0.7–1.3)
CREAT SERPL-MCNC: 4.72 MG/DL (ref 0.6–1.3)
CREAT SERPL-MCNC: 4.8 MG/DL (ref 0.7–1.3)
CREAT SERPL-MCNC: 4.85 MG/DL (ref 0.6–1.3)
CREAT SERPL-MCNC: 5.29 MG/DL (ref 0.6–1.3)
CREAT SERPL-MCNC: 6.04 MG/DL (ref 0.6–1.3)
CREAT UR-MCNC: 64.9 MG/DL
CRP SERPL QL: 14.5 MG/L
D DIMER PPP FEU-MCNC: 2.76 UG/ML FEU
EOSINOPHIL # BLD AUTO: 0.01 THOUSAND/ΜL (ref 0–0.61)
EOSINOPHIL # BLD AUTO: 0.06 THOUSAND/ΜL (ref 0–0.61)
EOSINOPHIL # BLD AUTO: 0.09 THOUSAND/ΜL (ref 0–0.61)
EOSINOPHIL # BLD AUTO: 0.09 THOUSAND/ΜL (ref 0–0.61)
EOSINOPHIL # BLD AUTO: 0.1 THOUSAND/ΜL (ref 0–0.61)
EOSINOPHIL # BLD AUTO: 0.11 THOUSAND/ΜL (ref 0–0.61)
EOSINOPHIL # BLD AUTO: 0.13 THOUSAND/ΜL (ref 0–0.61)
EOSINOPHIL # BLD AUTO: 0.14 THOUSAND/ΜL (ref 0–0.61)
EOSINOPHIL # BLD AUTO: 0.15 THOUSAND/ΜL (ref 0–0.61)
EOSINOPHIL # BLD AUTO: 0.15 THOUSAND/ΜL (ref 0–0.61)
EOSINOPHIL # BLD AUTO: 0.16 THOUSAND/ΜL (ref 0–0.61)
EOSINOPHIL # BLD AUTO: 0.18 THOUSAND/ΜL (ref 0–0.61)
EOSINOPHIL # BLD AUTO: 0.19 THOUSAND/ΜL (ref 0–0.61)
EOSINOPHIL # BLD AUTO: 0.21 THOUSAND/ΜL (ref 0–0.61)
EOSINOPHIL NFR BLD AUTO: 0 % (ref 0–6)
EOSINOPHIL NFR BLD AUTO: 1 % (ref 0–5)
EOSINOPHIL NFR BLD AUTO: 1 % (ref 0–5)
EOSINOPHIL NFR BLD AUTO: 1 % (ref 0–6)
EOSINOPHIL NFR BLD AUTO: 2 % (ref 0–6)
EOSINOPHIL NFR BLD AUTO: 3 % (ref 0–6)
EOSINOPHIL NFR BLD AUTO: 3 % (ref 0–6)
EPO SERPL-ACNC: 7.7 MIU/ML (ref 2.6–18.5)
ERYTHROCYTE [DISTWIDTH] IN BLOOD BY AUTOMATED COUNT: 12.9 % (ref 11.6–15.1)
ERYTHROCYTE [DISTWIDTH] IN BLOOD BY AUTOMATED COUNT: 13.2 % (ref 11.6–15.1)
ERYTHROCYTE [DISTWIDTH] IN BLOOD BY AUTOMATED COUNT: 13.4 % (ref 11.6–15.1)
ERYTHROCYTE [DISTWIDTH] IN BLOOD BY AUTOMATED COUNT: 13.5 % (ref 11.6–15.1)
ERYTHROCYTE [DISTWIDTH] IN BLOOD BY AUTOMATED COUNT: 13.5 % (ref 11.6–15.1)
ERYTHROCYTE [DISTWIDTH] IN BLOOD BY AUTOMATED COUNT: 13.6 % (ref 11.6–15.1)
ERYTHROCYTE [DISTWIDTH] IN BLOOD BY AUTOMATED COUNT: 13.7 % (ref 11.6–15.1)
ERYTHROCYTE [DISTWIDTH] IN BLOOD BY AUTOMATED COUNT: 13.8 % (ref 11.6–15.1)
ERYTHROCYTE [DISTWIDTH] IN BLOOD BY AUTOMATED COUNT: 13.9 % (ref 11.6–15.1)
ERYTHROCYTE [DISTWIDTH] IN BLOOD BY AUTOMATED COUNT: 13.9 % (ref 11.6–15.1)
ERYTHROCYTE [DISTWIDTH] IN BLOOD BY AUTOMATED COUNT: 14 % (ref 11.5–14.5)
ERYTHROCYTE [DISTWIDTH] IN BLOOD BY AUTOMATED COUNT: 14.2 % (ref 11.5–14.5)
ERYTHROCYTE [DISTWIDTH] IN BLOOD BY AUTOMATED COUNT: 14.3 % (ref 11.5–14.5)
ERYTHROCYTE [DISTWIDTH] IN BLOOD BY AUTOMATED COUNT: 15.4 % (ref 11.6–15.1)
ERYTHROCYTE [DISTWIDTH] IN BLOOD BY AUTOMATED COUNT: 15.7 % (ref 11.6–15.1)
ERYTHROCYTE [SEDIMENTATION RATE] IN BLOOD: 57 MM/HOUR (ref 0–19)
EST. AVERAGE GLUCOSE BLD GHB EST-MCNC: 148 MG/DL
FERRITIN SERPL-MCNC: 279 NG/ML (ref 8–388)
FERRITIN SERPL-MCNC: 453 NG/ML (ref 8–388)
FINE GRAN CASTS URNS QL MICRO: ABNORMAL /LPF
FLUAV RNA RESP QL NAA+PROBE: NEGATIVE
FLUBV RNA RESP QL NAA+PROBE: NEGATIVE
FOLATE SERPL-MCNC: >20 NG/ML (ref 3.1–17.5)
FUNGUS SPEC CULT: ABNORMAL
GFR SERPL CREATININE-BSD FRML MDRD: 10 ML/MIN/1.73SQ M
GFR SERPL CREATININE-BSD FRML MDRD: 11 ML/MIN/1.73SQ M
GFR SERPL CREATININE-BSD FRML MDRD: 12 ML/MIN/1.73SQ M
GFR SERPL CREATININE-BSD FRML MDRD: 13 ML/MIN/1.73SQ M
GFR SERPL CREATININE-BSD FRML MDRD: 14 ML/MIN/1.73SQ M
GFR SERPL CREATININE-BSD FRML MDRD: 14 ML/MIN/1.73SQ M
GFR SERPL CREATININE-BSD FRML MDRD: 15 ML/MIN/1.73SQ M
GFR SERPL CREATININE-BSD FRML MDRD: 8 ML/MIN/1.73SQ M
GFR SERPL CREATININE-BSD FRML MDRD: 9 ML/MIN/1.73SQ M
GLUCOSE P FAST SERPL-MCNC: 112 MG/DL (ref 65–99)
GLUCOSE P FAST SERPL-MCNC: 112 MG/DL (ref 65–99)
GLUCOSE P FAST SERPL-MCNC: 146 MG/DL (ref 65–99)
GLUCOSE P FAST SERPL-MCNC: 178 MG/DL (ref 65–99)
GLUCOSE P FAST SERPL-MCNC: 70 MG/DL (ref 65–99)
GLUCOSE P FAST SERPL-MCNC: 91 MG/DL (ref 65–99)
GLUCOSE P FAST SERPL-MCNC: 94 MG/DL (ref 65–99)
GLUCOSE P FAST SERPL-MCNC: 97 MG/DL (ref 65–99)
GLUCOSE SERPL-MCNC: 103 MG/DL (ref 65–140)
GLUCOSE SERPL-MCNC: 103 MG/DL (ref 65–140)
GLUCOSE SERPL-MCNC: 105 MG/DL (ref 65–140)
GLUCOSE SERPL-MCNC: 110 MG/DL (ref 65–140)
GLUCOSE SERPL-MCNC: 120 MG/DL (ref 65–140)
GLUCOSE SERPL-MCNC: 131 MG/DL (ref 65–140)
GLUCOSE SERPL-MCNC: 133 MG/DL (ref 65–140)
GLUCOSE SERPL-MCNC: 134 MG/DL (ref 65–140)
GLUCOSE SERPL-MCNC: 135 MG/DL (ref 65–140)
GLUCOSE SERPL-MCNC: 136 MG/DL (ref 65–140)
GLUCOSE SERPL-MCNC: 145 MG/DL (ref 65–140)
GLUCOSE SERPL-MCNC: 146 MG/DL (ref 65–140)
GLUCOSE SERPL-MCNC: 146 MG/DL (ref 65–140)
GLUCOSE SERPL-MCNC: 149 MG/DL (ref 65–140)
GLUCOSE SERPL-MCNC: 151 MG/DL (ref 65–140)
GLUCOSE SERPL-MCNC: 153 MG/DL (ref 65–140)
GLUCOSE SERPL-MCNC: 161 MG/DL (ref 65–140)
GLUCOSE SERPL-MCNC: 163 MG/DL (ref 65–140)
GLUCOSE SERPL-MCNC: 165 MG/DL (ref 65–140)
GLUCOSE SERPL-MCNC: 166 MG/DL (ref 65–140)
GLUCOSE SERPL-MCNC: 166 MG/DL (ref 65–140)
GLUCOSE SERPL-MCNC: 175 MG/DL (ref 65–140)
GLUCOSE SERPL-MCNC: 176 MG/DL (ref 65–140)
GLUCOSE SERPL-MCNC: 179 MG/DL (ref 65–140)
GLUCOSE SERPL-MCNC: 182 MG/DL (ref 65–140)
GLUCOSE SERPL-MCNC: 188 MG/DL (ref 65–140)
GLUCOSE SERPL-MCNC: 192 MG/DL (ref 65–140)
GLUCOSE SERPL-MCNC: 192 MG/DL (ref 65–140)
GLUCOSE SERPL-MCNC: 200 MG/DL (ref 65–140)
GLUCOSE SERPL-MCNC: 201 MG/DL (ref 65–140)
GLUCOSE SERPL-MCNC: 202 MG/DL (ref 65–99)
GLUCOSE SERPL-MCNC: 207 MG/DL (ref 65–140)
GLUCOSE SERPL-MCNC: 210 MG/DL (ref 65–140)
GLUCOSE SERPL-MCNC: 211 MG/DL (ref 65–140)
GLUCOSE SERPL-MCNC: 211 MG/DL (ref 65–140)
GLUCOSE SERPL-MCNC: 216 MG/DL (ref 65–140)
GLUCOSE SERPL-MCNC: 217 MG/DL (ref 65–140)
GLUCOSE SERPL-MCNC: 223 MG/DL (ref 65–140)
GLUCOSE SERPL-MCNC: 223 MG/DL (ref 65–140)
GLUCOSE SERPL-MCNC: 226 MG/DL (ref 65–140)
GLUCOSE SERPL-MCNC: 227 MG/DL (ref 65–140)
GLUCOSE SERPL-MCNC: 231 MG/DL (ref 65–140)
GLUCOSE SERPL-MCNC: 231 MG/DL (ref 65–140)
GLUCOSE SERPL-MCNC: 234 MG/DL (ref 65–140)
GLUCOSE SERPL-MCNC: 238 MG/DL (ref 65–140)
GLUCOSE SERPL-MCNC: 241 MG/DL (ref 65–140)
GLUCOSE SERPL-MCNC: 250 MG/DL (ref 65–140)
GLUCOSE SERPL-MCNC: 262 MG/DL (ref 65–140)
GLUCOSE SERPL-MCNC: 270 MG/DL (ref 65–140)
GLUCOSE SERPL-MCNC: 275 MG/DL (ref 65–140)
GLUCOSE SERPL-MCNC: 279 MG/DL (ref 65–140)
GLUCOSE SERPL-MCNC: 286 MG/DL (ref 65–140)
GLUCOSE SERPL-MCNC: 286 MG/DL (ref 65–140)
GLUCOSE SERPL-MCNC: 293 MG/DL (ref 65–140)
GLUCOSE SERPL-MCNC: 302 MG/DL (ref 65–140)
GLUCOSE SERPL-MCNC: 304 MG/DL (ref 65–140)
GLUCOSE SERPL-MCNC: 42 MG/DL (ref 65–99)
GLUCOSE SERPL-MCNC: 47 MG/DL (ref 65–140)
GLUCOSE SERPL-MCNC: 48 MG/DL (ref 65–140)
GLUCOSE SERPL-MCNC: 51 MG/DL (ref 65–140)
GLUCOSE SERPL-MCNC: 51 MG/DL (ref 65–140)
GLUCOSE SERPL-MCNC: 54 MG/DL (ref 65–140)
GLUCOSE SERPL-MCNC: 54 MG/DL (ref 65–140)
GLUCOSE SERPL-MCNC: 63 MG/DL (ref 65–140)
GLUCOSE SERPL-MCNC: 65 MG/DL (ref 65–140)
GLUCOSE SERPL-MCNC: 65 MG/DL (ref 65–140)
GLUCOSE SERPL-MCNC: 67 MG/DL (ref 65–99)
GLUCOSE SERPL-MCNC: 71 MG/DL (ref 65–140)
GLUCOSE SERPL-MCNC: 72 MG/DL (ref 65–140)
GLUCOSE SERPL-MCNC: 72 MG/DL (ref 65–140)
GLUCOSE SERPL-MCNC: 84 MG/DL (ref 65–140)
GLUCOSE SERPL-MCNC: 89 MG/DL (ref 65–140)
GLUCOSE SERPL-MCNC: 94 MG/DL (ref 65–140)
GLUCOSE SERPL-MCNC: 98 MG/DL (ref 65–140)
GLUCOSE UR STRIP-MCNC: ABNORMAL MG/DL
GLUCOSE UR STRIP-MCNC: NEGATIVE MG/DL
HBA1C MFR BLD: 6.8 %
HCT VFR BLD AUTO: 25.2 % (ref 36.5–49.3)
HCT VFR BLD AUTO: 25.9 % (ref 36.5–49.3)
HCT VFR BLD AUTO: 26.2 % (ref 36.5–49.3)
HCT VFR BLD AUTO: 26.2 % (ref 36.5–49.3)
HCT VFR BLD AUTO: 26.3 % (ref 36.5–49.3)
HCT VFR BLD AUTO: 26.6 % (ref 36.5–49.3)
HCT VFR BLD AUTO: 26.8 % (ref 36.5–49.3)
HCT VFR BLD AUTO: 27.5 % (ref 36.5–49.3)
HCT VFR BLD AUTO: 28.3 % (ref 42–47)
HCT VFR BLD AUTO: 28.8 % (ref 36.5–49.3)
HCT VFR BLD AUTO: 28.9 % (ref 42–47)
HCT VFR BLD AUTO: 29 % (ref 36.5–49.3)
HCT VFR BLD AUTO: 29.3 % (ref 36.5–49.3)
HCT VFR BLD AUTO: 30.6 % (ref 36.5–49.3)
HCT VFR BLD AUTO: 32.7 % (ref 36.5–49.3)
HCT VFR BLD AUTO: 32.9 % (ref 36.5–49.3)
HCT VFR BLD AUTO: 33.7 % (ref 42–47)
HCT VFR BLD AUTO: 34.3 % (ref 36.5–49.3)
HCT VFR BLD AUTO: 34.7 % (ref 36.5–49.3)
HCT VFR BLD AUTO: 35.5 % (ref 36.5–49.3)
HCT VFR BLD AUTO: 36.2 % (ref 36.5–49.3)
HCT VFR BLD AUTO: 39.1 % (ref 36.5–49.3)
HDLC SERPL-MCNC: 50 MG/DL
HGB BLD-MCNC: 10.1 G/DL (ref 12–17)
HGB BLD-MCNC: 10.7 G/DL (ref 12–17)
HGB BLD-MCNC: 10.9 G/DL (ref 12–17)
HGB BLD-MCNC: 11.2 G/DL (ref 12–17)
HGB BLD-MCNC: 11.2 G/DL (ref 14–18)
HGB BLD-MCNC: 11.4 G/DL (ref 12–17)
HGB BLD-MCNC: 11.5 G/DL (ref 12–17)
HGB BLD-MCNC: 11.7 G/DL (ref 12–17)
HGB BLD-MCNC: 13 G/DL (ref 12–17)
HGB BLD-MCNC: 8.3 G/DL (ref 12–17)
HGB BLD-MCNC: 8.4 G/DL (ref 12–17)
HGB BLD-MCNC: 8.5 G/DL (ref 12–17)
HGB BLD-MCNC: 8.6 G/DL (ref 12–17)
HGB BLD-MCNC: 8.6 G/DL (ref 12–17)
HGB BLD-MCNC: 8.7 G/DL (ref 12–17)
HGB BLD-MCNC: 8.7 G/DL (ref 12–17)
HGB BLD-MCNC: 8.9 G/DL (ref 12–17)
HGB BLD-MCNC: 9.1 G/DL (ref 12–17)
HGB BLD-MCNC: 9.4 G/DL (ref 12–17)
HGB BLD-MCNC: 9.5 G/DL (ref 12–17)
HGB BLD-MCNC: 9.5 G/DL (ref 14–18)
HGB BLD-MCNC: 9.7 G/DL (ref 14–18)
HGB UR QL STRIP.AUTO: ABNORMAL
HGB UR QL STRIP.AUTO: ABNORMAL
IMM GRANULOCYTES # BLD AUTO: 0.01 THOUSAND/UL (ref 0–0.2)
IMM GRANULOCYTES # BLD AUTO: 0.02 THOUSAND/UL (ref 0–0.2)
IMM GRANULOCYTES # BLD AUTO: 0.03 THOUSAND/UL (ref 0–0.2)
IMM GRANULOCYTES # BLD AUTO: 0.04 THOUSAND/UL (ref 0–0.2)
IMM GRANULOCYTES # BLD AUTO: 0.04 THOUSAND/UL (ref 0–0.2)
IMM GRANULOCYTES # BLD AUTO: 0.05 THOUSAND/UL (ref 0–0.2)
IMM GRANULOCYTES NFR BLD AUTO: 0 % (ref 0–2)
IMM GRANULOCYTES NFR BLD AUTO: 1 % (ref 0–2)
INR PPP: 0.98 (ref 0.84–1.19)
IRON SATN MFR SERPL: 20 %
IRON SATN MFR SERPL: 24 %
IRON SERPL-MCNC: 49 UG/DL (ref 65–175)
IRON SERPL-MCNC: 77 UG/DL (ref 65–175)
KETONES UR STRIP-MCNC: ABNORMAL MG/DL
KETONES UR STRIP-MCNC: NEGATIVE MG/DL
LACTATE SERPL-SCNC: 1 MMOL/L (ref 0.5–2)
LACTATE SERPL-SCNC: 2.5 MMOL/L (ref 0.5–2)
LDH SERPL-CCNC: 181 U/L (ref 81–234)
LDLC SERPL CALC-MCNC: 78 MG/DL (ref 0–100)
LEFT EYE DIABETIC RETINOPATHY: NORMAL
LEUKOCYTE ESTERASE UR QL STRIP: ABNORMAL
LEUKOCYTE ESTERASE UR QL STRIP: ABNORMAL
LIPASE SERPL-CCNC: 29 U/L (ref 11–82)
LYMPHOCYTES # BLD AUTO: 0.8 THOUSANDS/ΜL (ref 0.6–4.47)
LYMPHOCYTES # BLD AUTO: 0.87 THOUSANDS/ΜL (ref 0.6–4.47)
LYMPHOCYTES # BLD AUTO: 0.98 THOUSANDS/ΜL (ref 0.6–4.47)
LYMPHOCYTES # BLD AUTO: 1 THOUSANDS/ΜL (ref 0.6–4.47)
LYMPHOCYTES # BLD AUTO: 1.03 THOUSANDS/ΜL (ref 0.6–4.47)
LYMPHOCYTES # BLD AUTO: 1.18 THOUSANDS/ΜL (ref 0.6–4.47)
LYMPHOCYTES # BLD AUTO: 1.37 THOUSANDS/ΜL (ref 0.6–4.47)
LYMPHOCYTES # BLD AUTO: 1.58 THOUSANDS/ΜL (ref 0.6–4.47)
LYMPHOCYTES # BLD AUTO: 1.62 THOUSANDS/ΜL (ref 0.6–4.47)
LYMPHOCYTES # BLD AUTO: 1.65 THOUSANDS/ΜL (ref 0.6–4.47)
LYMPHOCYTES # BLD AUTO: 1.98 THOUSANDS/ΜL (ref 0.6–4.47)
LYMPHOCYTES # BLD AUTO: 2 THOUSANDS/ΜL (ref 0.6–4.47)
LYMPHOCYTES # BLD AUTO: 2.12 THOUSANDS/ΜL (ref 0.6–4.47)
LYMPHOCYTES # BLD AUTO: 2.27 THOUSANDS/ΜL (ref 0.6–4.47)
LYMPHOCYTES # BLD AUTO: 2.8 THOUSANDS/ΜL (ref 0.6–4.47)
LYMPHOCYTES # BLD AUTO: 2.92 THOUSANDS/ΜL (ref 0.6–4.47)
LYMPHOCYTES NFR BLD AUTO: 10 % (ref 14–44)
LYMPHOCYTES NFR BLD AUTO: 10 % (ref 14–44)
LYMPHOCYTES NFR BLD AUTO: 14 % (ref 14–44)
LYMPHOCYTES NFR BLD AUTO: 15 % (ref 21–51)
LYMPHOCYTES NFR BLD AUTO: 16 % (ref 14–44)
LYMPHOCYTES NFR BLD AUTO: 16 % (ref 14–44)
LYMPHOCYTES NFR BLD AUTO: 16 % (ref 21–51)
LYMPHOCYTES NFR BLD AUTO: 17 % (ref 14–44)
LYMPHOCYTES NFR BLD AUTO: 19 % (ref 14–44)
LYMPHOCYTES NFR BLD AUTO: 19 % (ref 14–44)
LYMPHOCYTES NFR BLD AUTO: 21 % (ref 14–44)
LYMPHOCYTES NFR BLD AUTO: 21 % (ref 14–44)
LYMPHOCYTES NFR BLD AUTO: 25 % (ref 14–44)
LYMPHOCYTES NFR BLD AUTO: 29 % (ref 14–44)
MAGNESIUM SERPL-MCNC: 1.9 MG/DL (ref 1.6–2.6)
MAGNESIUM SERPL-MCNC: 1.9 MG/DL (ref 1.9–2.7)
MAGNESIUM SERPL-MCNC: 2.3 MG/DL (ref 1.6–2.6)
MAGNESIUM SERPL-MCNC: 2.4 MG/DL (ref 1.6–2.6)
MAGNESIUM SERPL-MCNC: 2.6 MG/DL (ref 1.6–2.6)
MAGNESIUM SERPL-MCNC: 2.8 MG/DL (ref 1.6–2.6)
MCH RBC QN AUTO: 29.8 PG (ref 26.8–34.3)
MCH RBC QN AUTO: 29.9 PG (ref 26.8–34.3)
MCH RBC QN AUTO: 29.9 PG (ref 26.8–34.3)
MCH RBC QN AUTO: 30.1 PG (ref 26.8–34.3)
MCH RBC QN AUTO: 30.1 PG (ref 26.8–34.3)
MCH RBC QN AUTO: 30.2 PG (ref 26.8–34.3)
MCH RBC QN AUTO: 30.3 PG (ref 26–34)
MCH RBC QN AUTO: 30.4 PG (ref 26.8–34.3)
MCH RBC QN AUTO: 30.5 PG (ref 26.8–34.3)
MCH RBC QN AUTO: 30.5 PG (ref 26–34)
MCH RBC QN AUTO: 30.6 PG (ref 26.8–34.3)
MCH RBC QN AUTO: 30.7 PG (ref 26.8–34.3)
MCH RBC QN AUTO: 30.7 PG (ref 26–34)
MCH RBC QN AUTO: 30.9 PG (ref 26.8–34.3)
MCH RBC QN AUTO: 30.9 PG (ref 26.8–34.3)
MCH RBC QN AUTO: 31.3 PG (ref 26.8–34.3)
MCHC RBC AUTO-ENTMCNC: 31.6 G/DL (ref 31.4–37.4)
MCHC RBC AUTO-ENTMCNC: 32.1 G/DL (ref 31.4–37.4)
MCHC RBC AUTO-ENTMCNC: 32.3 G/DL (ref 31.4–37.4)
MCHC RBC AUTO-ENTMCNC: 32.4 G/DL (ref 31.4–37.4)
MCHC RBC AUTO-ENTMCNC: 32.5 G/DL (ref 31.4–37.4)
MCHC RBC AUTO-ENTMCNC: 32.7 G/DL (ref 31.4–37.4)
MCHC RBC AUTO-ENTMCNC: 32.9 G/DL (ref 31.4–37.4)
MCHC RBC AUTO-ENTMCNC: 32.9 G/DL (ref 31.4–37.4)
MCHC RBC AUTO-ENTMCNC: 33 G/DL (ref 31.4–37.4)
MCHC RBC AUTO-ENTMCNC: 33.1 G/DL (ref 31.4–37.4)
MCHC RBC AUTO-ENTMCNC: 33.2 G/DL (ref 31.4–37.4)
MCHC RBC AUTO-ENTMCNC: 33.2 G/DL (ref 31.4–37.4)
MCHC RBC AUTO-ENTMCNC: 33.4 G/DL (ref 31–37)
MCHC RBC AUTO-ENTMCNC: 33.5 G/DL (ref 31–37)
MCHC RBC AUTO-ENTMCNC: 33.7 G/DL (ref 31–37)
MCV RBC AUTO: 91 FL (ref 81–99)
MCV RBC AUTO: 91 FL (ref 82–98)
MCV RBC AUTO: 92 FL (ref 82–98)
MCV RBC AUTO: 93 FL (ref 82–98)
MCV RBC AUTO: 94 FL (ref 82–98)
MCV RBC AUTO: 95 FL (ref 82–98)
MCV RBC AUTO: 96 FL (ref 82–98)
MICROALBUMIN UR-MCNC: 127 MG/L (ref 0–20)
MICROALBUMIN/CREAT 24H UR: 196 MG/G CREATININE (ref 0–30)
MONOCYTES # BLD AUTO: 0.45 THOUSAND/ΜL (ref 0.17–1.22)
MONOCYTES # BLD AUTO: 0.63 THOUSAND/ΜL (ref 0.17–1.22)
MONOCYTES # BLD AUTO: 0.64 THOUSAND/ΜL (ref 0.17–1.22)
MONOCYTES # BLD AUTO: 0.64 THOUSAND/ΜL (ref 0.17–1.22)
MONOCYTES # BLD AUTO: 0.7 THOUSAND/ΜL (ref 0.17–1.22)
MONOCYTES # BLD AUTO: 0.72 THOUSAND/ΜL (ref 0.17–1.22)
MONOCYTES # BLD AUTO: 0.75 THOUSAND/ΜL (ref 0.17–1.22)
MONOCYTES # BLD AUTO: 0.75 THOUSAND/ΜL (ref 0.17–1.22)
MONOCYTES # BLD AUTO: 0.79 THOUSAND/ΜL (ref 0.17–1.22)
MONOCYTES # BLD AUTO: 0.83 THOUSAND/ΜL (ref 0.17–1.22)
MONOCYTES # BLD AUTO: 0.84 THOUSAND/ΜL (ref 0.17–1.22)
MONOCYTES # BLD AUTO: 0.85 THOUSAND/ΜL (ref 0.17–1.22)
MONOCYTES # BLD AUTO: 0.87 THOUSAND/ΜL (ref 0.17–1.22)
MONOCYTES # BLD AUTO: 0.9 THOUSAND/ΜL (ref 0.17–1.22)
MONOCYTES # BLD AUTO: 0.93 THOUSAND/ΜL (ref 0.17–1.22)
MONOCYTES # BLD AUTO: 0.93 THOUSAND/ΜL (ref 0.17–1.22)
MONOCYTES NFR BLD AUTO: 10 % (ref 4–12)
MONOCYTES NFR BLD AUTO: 11 % (ref 2–12)
MONOCYTES NFR BLD AUTO: 11 % (ref 4–12)
MONOCYTES NFR BLD AUTO: 11 % (ref 4–12)
MONOCYTES NFR BLD AUTO: 12 % (ref 4–12)
MONOCYTES NFR BLD AUTO: 5 % (ref 4–12)
MONOCYTES NFR BLD AUTO: 6 % (ref 4–12)
MONOCYTES NFR BLD AUTO: 7 % (ref 2–12)
MONOCYTES NFR BLD AUTO: 7 % (ref 4–12)
MONOCYTES NFR BLD AUTO: 8 % (ref 4–12)
MONOCYTES NFR BLD AUTO: 9 % (ref 4–12)
MONOCYTES NFR BLD AUTO: 9 % (ref 4–12)
NEUTROPHILS # BLD AUTO: 10.4 THOUSANDS/ΜL (ref 1.4–6.5)
NEUTROPHILS # BLD AUTO: 3.4 THOUSANDS/ΜL (ref 1.85–7.62)
NEUTROPHILS # BLD AUTO: 4.37 THOUSANDS/ΜL (ref 1.85–7.62)
NEUTROPHILS # BLD AUTO: 4.5 THOUSANDS/ΜL (ref 1.4–6.5)
NEUTROPHILS # BLD AUTO: 4.93 THOUSANDS/ΜL (ref 1.85–7.62)
NEUTROPHILS # BLD AUTO: 4.99 THOUSANDS/ΜL (ref 1.85–7.62)
NEUTROPHILS # BLD AUTO: 5.46 THOUSANDS/ΜL (ref 1.85–7.62)
NEUTROPHILS # BLD AUTO: 5.49 THOUSANDS/ΜL (ref 1.85–7.62)
NEUTROPHILS # BLD AUTO: 5.61 THOUSANDS/ΜL (ref 1.85–7.62)
NEUTROPHILS # BLD AUTO: 5.83 THOUSANDS/ΜL (ref 1.85–7.62)
NEUTROPHILS # BLD AUTO: 5.91 THOUSANDS/ΜL (ref 1.85–7.62)
NEUTROPHILS # BLD AUTO: 6.21 THOUSANDS/ΜL (ref 1.85–7.62)
NEUTROPHILS # BLD AUTO: 6.84 THOUSANDS/ΜL (ref 1.85–7.62)
NEUTROPHILS # BLD AUTO: 7.23 THOUSANDS/ΜL (ref 1.85–7.62)
NEUTROPHILS # BLD AUTO: 7.26 THOUSANDS/ΜL (ref 1.85–7.62)
NEUTROPHILS # BLD AUTO: 9.74 THOUSANDS/ΜL (ref 1.85–7.62)
NEUTS SEG NFR BLD AUTO: 59 % (ref 43–75)
NEUTS SEG NFR BLD AUTO: 61 % (ref 43–75)
NEUTS SEG NFR BLD AUTO: 62 % (ref 43–75)
NEUTS SEG NFR BLD AUTO: 64 % (ref 43–75)
NEUTS SEG NFR BLD AUTO: 65 % (ref 43–75)
NEUTS SEG NFR BLD AUTO: 67 % (ref 43–75)
NEUTS SEG NFR BLD AUTO: 68 % (ref 43–75)
NEUTS SEG NFR BLD AUTO: 70 % (ref 43–75)
NEUTS SEG NFR BLD AUTO: 71 % (ref 42–75)
NEUTS SEG NFR BLD AUTO: 71 % (ref 43–75)
NEUTS SEG NFR BLD AUTO: 72 % (ref 43–75)
NEUTS SEG NFR BLD AUTO: 72 % (ref 43–75)
NEUTS SEG NFR BLD AUTO: 74 % (ref 43–75)
NEUTS SEG NFR BLD AUTO: 77 % (ref 42–75)
NEUTS SEG NFR BLD AUTO: 77 % (ref 43–75)
NEUTS SEG NFR BLD AUTO: 82 % (ref 43–75)
NITRITE UR QL STRIP: NEGATIVE
NITRITE UR QL STRIP: NEGATIVE
NON-SQ EPI CELLS URNS QL MICRO: ABNORMAL /HPF
NON-SQ EPI CELLS URNS QL MICRO: ABNORMAL /HPF
NONHDLC SERPL-MCNC: 85 MG/DL
NRBC BLD AUTO-RTO: 0 /100 WBCS
P AXIS: 28 DEGREES
P AXIS: 29 DEGREES
P AXIS: 42 DEGREES
P AXIS: 55 DEGREES
P AXIS: 68 DEGREES
P AXIS: 69 DEGREES
P AXIS: 72 DEGREES
P AXIS: 78 DEGREES
P AXIS: 91 DEGREES
P AXIS: 91 DEGREES
PH UR STRIP.AUTO: 5.5 [PH]
PH UR STRIP.AUTO: 5.5 [PH]
PHOSPHATE SERPL-MCNC: 4.3 MG/DL (ref 2.3–4.1)
PHOSPHATE SERPL-MCNC: 4.4 MG/DL (ref 3–5.5)
PHOSPHATE SERPL-MCNC: 5.4 MG/DL (ref 2.3–4.1)
PLATELET # BLD AUTO: 124 THOUSANDS/UL (ref 149–390)
PLATELET # BLD AUTO: 126 THOUSANDS/UL (ref 149–390)
PLATELET # BLD AUTO: 128 THOUSANDS/UL (ref 149–390)
PLATELET # BLD AUTO: 132 THOUSANDS/UL (ref 149–390)
PLATELET # BLD AUTO: 133 THOUSANDS/UL (ref 149–390)
PLATELET # BLD AUTO: 136 THOUSANDS/UL (ref 149–390)
PLATELET # BLD AUTO: 137 THOUSANDS/UL (ref 149–390)
PLATELET # BLD AUTO: 138 THOUSANDS/UL (ref 149–390)
PLATELET # BLD AUTO: 139 THOUSANDS/UL (ref 149–390)
PLATELET # BLD AUTO: 146 THOUSANDS/UL (ref 149–390)
PLATELET # BLD AUTO: 151 THOUSANDS/UL (ref 149–390)
PLATELET # BLD AUTO: 159 THOUSANDS/UL (ref 149–390)
PLATELET # BLD AUTO: 163 THOUSANDS/UL (ref 149–390)
PLATELET # BLD AUTO: 170 THOUSANDS/UL (ref 149–390)
PLATELET # BLD AUTO: 170 THOUSANDS/UL (ref 149–390)
PLATELET # BLD AUTO: 179 THOUSANDS/UL (ref 149–390)
PLATELET # BLD AUTO: 180 THOUSANDS/UL (ref 149–390)
PLATELET # BLD AUTO: 182 THOUSANDS/UL (ref 149–390)
PLATELET # BLD AUTO: 191 THOUSANDS/UL (ref 149–390)
PLATELET # BLD AUTO: 200 THOUSANDS/UL (ref 149–390)
PLATELET # BLD AUTO: 203 THOUSANDS/UL (ref 149–390)
PLATELET # BLD AUTO: 209 THOUSANDS/UL (ref 149–390)
PLATELET # BLD AUTO: 286 THOUSANDS/UL (ref 149–390)
PMV BLD AUTO: 10.4 FL (ref 8.9–12.7)
PMV BLD AUTO: 10.5 FL (ref 8.9–12.7)
PMV BLD AUTO: 10.6 FL (ref 8.9–12.7)
PMV BLD AUTO: 10.8 FL (ref 8.9–12.7)
PMV BLD AUTO: 10.8 FL (ref 8.9–12.7)
PMV BLD AUTO: 10.9 FL (ref 8.9–12.7)
PMV BLD AUTO: 11 FL (ref 8.9–12.7)
PMV BLD AUTO: 11.1 FL (ref 8.9–12.7)
PMV BLD AUTO: 11.2 FL (ref 8.9–12.7)
PMV BLD AUTO: 11.3 FL (ref 8.9–12.7)
PMV BLD AUTO: 11.3 FL (ref 8.9–12.7)
PMV BLD AUTO: 11.8 FL (ref 8.9–12.7)
PMV BLD AUTO: 12 FL (ref 8.9–12.7)
PMV BLD AUTO: 9.1 FL (ref 8.6–11.7)
PMV BLD AUTO: 9.4 FL (ref 8.6–11.7)
PMV BLD AUTO: 9.6 FL (ref 8.6–11.7)
POTASSIUM SERPL-SCNC: 3.2 MMOL/L (ref 3.5–5.5)
POTASSIUM SERPL-SCNC: 3.4 MMOL/L (ref 3.5–5.5)
POTASSIUM SERPL-SCNC: 3.5 MMOL/L (ref 3.5–5.3)
POTASSIUM SERPL-SCNC: 3.6 MMOL/L (ref 3.5–5.3)
POTASSIUM SERPL-SCNC: 3.6 MMOL/L (ref 3.5–5.3)
POTASSIUM SERPL-SCNC: 3.6 MMOL/L (ref 3.5–5.5)
POTASSIUM SERPL-SCNC: 3.7 MMOL/L (ref 3.5–5.3)
POTASSIUM SERPL-SCNC: 3.9 MMOL/L (ref 3.5–5.3)
POTASSIUM SERPL-SCNC: 4 MMOL/L (ref 3.5–5.3)
POTASSIUM SERPL-SCNC: 4.1 MMOL/L (ref 3.5–5.3)
POTASSIUM SERPL-SCNC: 4.2 MMOL/L (ref 3.5–5.3)
POTASSIUM SERPL-SCNC: 4.2 MMOL/L (ref 3.5–5.3)
POTASSIUM SERPL-SCNC: 4.3 MMOL/L (ref 3.5–5.3)
POTASSIUM SERPL-SCNC: 4.4 MMOL/L (ref 3.5–5.3)
POTASSIUM SERPL-SCNC: 4.5 MMOL/L (ref 3.5–5.3)
POTASSIUM SERPL-SCNC: 4.8 MMOL/L (ref 3.5–5.3)
POTASSIUM SERPL-SCNC: 4.9 MMOL/L (ref 3.5–5.3)
POTASSIUM SERPL-SCNC: 5.3 MMOL/L (ref 3.5–5.3)
POTASSIUM SERPL-SCNC: 5.3 MMOL/L (ref 3.5–5.5)
PR INTERVAL: 132 MS
PR INTERVAL: 140 MS
PR INTERVAL: 150 MS
PR INTERVAL: 150 MS
PR INTERVAL: 158 MS
PR INTERVAL: 162 MS
PR INTERVAL: 164 MS
PR INTERVAL: 172 MS
PR INTERVAL: 182 MS
PR INTERVAL: 182 MS
PROCALCITONIN SERPL-MCNC: 0.2 NG/ML
PROCALCITONIN SERPL-MCNC: 0.21 NG/ML
PROT SERPL-MCNC: 5.6 G/DL (ref 6.4–8.2)
PROT SERPL-MCNC: 5.9 G/DL (ref 6.4–8.2)
PROT SERPL-MCNC: 6.4 G/DL (ref 6.4–8.2)
PROT SERPL-MCNC: 6.4 G/DL (ref 6.4–8.2)
PROT SERPL-MCNC: 7.1 G/DL (ref 6.4–8.9)
PROT SERPL-MCNC: 7.2 G/DL (ref 6.4–8.2)
PROT SERPL-MCNC: 7.4 G/DL (ref 6.4–8.2)
PROT SERPL-MCNC: 7.4 G/DL (ref 6.4–8.2)
PROT SERPL-MCNC: 7.6 G/DL (ref 6.4–8.2)
PROT SERPL-MCNC: 7.8 G/DL (ref 6.4–8.2)
PROT SERPL-MCNC: 7.8 G/DL (ref 6.4–8.2)
PROT SERPL-MCNC: 8.1 G/DL (ref 6.4–8.2)
PROT UR STRIP-MCNC: ABNORMAL MG/DL
PROT UR STRIP-MCNC: ABNORMAL MG/DL
PROTHROMBIN TIME: 13.2 SECONDS (ref 11.6–14.5)
PTH-INTACT SERPL-MCNC: 166 PG/ML (ref 18.4–80.1)
QRS AXIS: -54 DEGREES
QRS AXIS: -55 DEGREES
QRS AXIS: -55 DEGREES
QRS AXIS: -56 DEGREES
QRS AXIS: -57 DEGREES
QRS AXIS: -59 DEGREES
QRS AXIS: -60 DEGREES
QRS AXIS: -61 DEGREES
QRS AXIS: -64 DEGREES
QRS AXIS: -64 DEGREES
QRSD INTERVAL: 100 MS
QRSD INTERVAL: 102 MS
QRSD INTERVAL: 104 MS
QRSD INTERVAL: 104 MS
QRSD INTERVAL: 92 MS
QRSD INTERVAL: 96 MS
QRSD INTERVAL: 98 MS
QRSD INTERVAL: 98 MS
QT INTERVAL: 354 MS
QT INTERVAL: 368 MS
QT INTERVAL: 374 MS
QT INTERVAL: 380 MS
QT INTERVAL: 382 MS
QT INTERVAL: 382 MS
QT INTERVAL: 386 MS
QT INTERVAL: 386 MS
QT INTERVAL: 388 MS
QT INTERVAL: 390 MS
QT INTERVAL: 390 MS
QT INTERVAL: 408 MS
QT INTERVAL: 412 MS
QTC INTERVAL: 428 MS
QTC INTERVAL: 445 MS
QTC INTERVAL: 450 MS
QTC INTERVAL: 453 MS
QTC INTERVAL: 462 MS
QTC INTERVAL: 467 MS
QTC INTERVAL: 469 MS
QTC INTERVAL: 477 MS
QTC INTERVAL: 477 MS
QTC INTERVAL: 479 MS
QTC INTERVAL: 480 MS
QTC INTERVAL: 490 MS
QTC INTERVAL: 492 MS
RBC # BLD AUTO: 2.71 MILLION/UL (ref 3.88–5.62)
RBC # BLD AUTO: 2.75 MILLION/UL (ref 3.88–5.62)
RBC # BLD AUTO: 2.8 MILLION/UL (ref 3.88–5.62)
RBC # BLD AUTO: 2.8 MILLION/UL (ref 3.88–5.62)
RBC # BLD AUTO: 2.82 MILLION/UL (ref 3.88–5.62)
RBC # BLD AUTO: 2.82 MILLION/UL (ref 3.88–5.62)
RBC # BLD AUTO: 2.91 MILLION/UL (ref 3.88–5.62)
RBC # BLD AUTO: 2.92 MILLION/UL (ref 3.88–5.62)
RBC # BLD AUTO: 3.01 MILLION/UL (ref 3.88–5.62)
RBC # BLD AUTO: 3.1 MILLION/UL (ref 4.3–5.9)
RBC # BLD AUTO: 3.11 MILLION/UL (ref 3.88–5.62)
RBC # BLD AUTO: 3.12 MILLION/UL (ref 3.88–5.62)
RBC # BLD AUTO: 3.19 MILLION/UL (ref 4.3–5.9)
RBC # BLD AUTO: 3.23 MILLION/UL (ref 3.88–5.62)
RBC # BLD AUTO: 3.5 MILLION/UL (ref 3.88–5.62)
RBC # BLD AUTO: 3.56 MILLION/UL (ref 3.88–5.62)
RBC # BLD AUTO: 3.69 MILLION/UL (ref 4.3–5.9)
RBC # BLD AUTO: 3.71 MILLION/UL (ref 3.88–5.62)
RBC # BLD AUTO: 3.79 MILLION/UL (ref 3.88–5.62)
RBC # BLD AUTO: 3.82 MILLION/UL (ref 3.88–5.62)
RBC # BLD AUTO: 3.91 MILLION/UL (ref 3.88–5.62)
RBC # BLD AUTO: 4.24 MILLION/UL (ref 3.88–5.62)
RBC #/AREA URNS AUTO: ABNORMAL /HPF
RBC #/AREA URNS AUTO: ABNORMAL /HPF
RETICS # AUTO: ABNORMAL 10*3/UL (ref 14356–105094)
RETICS # AUTO: ABNORMAL 10*3/UL (ref 14356–105094)
RETICS # CALC: 2.44 % (ref 0.37–1.87)
RETICS # CALC: 2.67 % (ref 0.37–1.87)
RIGHT EYE DIABETIC RETINOPATHY: NORMAL
RSV RNA RESP QL NAA+PROBE: NEGATIVE
SALMONELLA DNA SPEC QL NAA+PROBE: NORMAL
SARS-COV-2 RNA RESP QL NAA+PROBE: NEGATIVE
SARS-COV-2 RNA RESP QL NAA+PROBE: NEGATIVE
SEROTONIN PLAS-MCNC: 1716 NG/ML (ref 21–321)
SEVERITY (EYE EXAM): NORMAL
SHIGA TOXIN STX GENE SPEC NAA+PROBE: NORMAL
SHIGELLA DNA SPEC QL NAA+PROBE: NORMAL
SL AMB POCT HEMOGLOBIN AIC: 7.5 (ref ?–6.5)
SODIUM SERPL-SCNC: 135 MMOL/L (ref 134–143)
SODIUM SERPL-SCNC: 138 MMOL/L (ref 136–145)
SODIUM SERPL-SCNC: 139 MMOL/L (ref 134–143)
SODIUM SERPL-SCNC: 139 MMOL/L (ref 136–145)
SODIUM SERPL-SCNC: 140 MMOL/L (ref 136–145)
SODIUM SERPL-SCNC: 141 MMOL/L (ref 136–145)
SODIUM SERPL-SCNC: 142 MMOL/L (ref 134–143)
SODIUM SERPL-SCNC: 142 MMOL/L (ref 134–143)
SODIUM SERPL-SCNC: 142 MMOL/L (ref 136–145)
SODIUM SERPL-SCNC: 145 MMOL/L (ref 136–145)
SP GR UR STRIP.AUTO: 1.02 (ref 1–1.03)
SP GR UR STRIP.AUTO: 1.02 (ref 1–1.03)
T WAVE AXIS: 102 DEGREES
T WAVE AXIS: 105 DEGREES
T WAVE AXIS: 106 DEGREES
T WAVE AXIS: 111 DEGREES
T WAVE AXIS: 66 DEGREES
T WAVE AXIS: 81 DEGREES
T WAVE AXIS: 88 DEGREES
T WAVE AXIS: 89 DEGREES
T WAVE AXIS: 94 DEGREES
T WAVE AXIS: 95 DEGREES
T WAVE AXIS: 98 DEGREES
T WAVE AXIS: 98 DEGREES
T WAVE AXIS: 99 DEGREES
TIBC SERPL-MCNC: 242 UG/DL (ref 250–450)
TIBC SERPL-MCNC: 326 UG/DL (ref 250–450)
TRIGL SERPL-MCNC: 35 MG/DL
TROPONIN I SERPL-MCNC: <0.02 NG/ML
TROPONIN I SERPL-MCNC: <0.03 NG/ML
TSH SERPL DL<=0.05 MIU/L-ACNC: 1.29 UIU/ML (ref 0.36–3.74)
TSH SERPL DL<=0.05 MIU/L-ACNC: 3.47 UIU/ML (ref 0.36–3.74)
UROBILINOGEN UR QL STRIP.AUTO: 0.2 E.U./DL
UROBILINOGEN UR QL STRIP.AUTO: 0.2 E.U./DL
VENTRICULAR RATE: 80 BPM
VENTRICULAR RATE: 81 BPM
VENTRICULAR RATE: 82 BPM
VENTRICULAR RATE: 83 BPM
VENTRICULAR RATE: 85 BPM
VENTRICULAR RATE: 88 BPM
VENTRICULAR RATE: 88 BPM
VENTRICULAR RATE: 94 BPM
VENTRICULAR RATE: 95 BPM
VENTRICULAR RATE: 96 BPM
VENTRICULAR RATE: 96 BPM
VIT B12 SERPL-MCNC: 1664 PG/ML (ref 100–900)
WBC # BLD AUTO: 10.18 THOUSAND/UL (ref 4.31–10.16)
WBC # BLD AUTO: 10.32 THOUSAND/UL (ref 4.31–10.16)
WBC # BLD AUTO: 13.6 THOUSAND/UL (ref 4.8–10.8)
WBC # BLD AUTO: 13.68 THOUSAND/UL (ref 4.31–10.16)
WBC # BLD AUTO: 5.14 THOUSAND/UL (ref 4.31–10.16)
WBC # BLD AUTO: 6.39 THOUSAND/UL (ref 4.31–10.16)
WBC # BLD AUTO: 6.4 THOUSAND/UL (ref 4.8–10.8)
WBC # BLD AUTO: 6.85 THOUSAND/UL (ref 4.31–10.16)
WBC # BLD AUTO: 7.34 THOUSAND/UL (ref 4.31–10.16)
WBC # BLD AUTO: 7.68 THOUSAND/UL (ref 4.31–10.16)
WBC # BLD AUTO: 7.72 THOUSAND/UL (ref 4.31–10.16)
WBC # BLD AUTO: 7.76 THOUSAND/UL (ref 4.31–10.16)
WBC # BLD AUTO: 7.91 THOUSAND/UL (ref 4.31–10.16)
WBC # BLD AUTO: 8.04 THOUSAND/UL (ref 4.31–10.16)
WBC # BLD AUTO: 8.63 THOUSAND/UL (ref 4.31–10.16)
WBC # BLD AUTO: 8.67 THOUSAND/UL (ref 4.31–10.16)
WBC # BLD AUTO: 8.74 THOUSAND/UL (ref 4.31–10.16)
WBC # BLD AUTO: 8.9 THOUSAND/UL (ref 4.8–10.8)
WBC # BLD AUTO: 8.93 THOUSAND/UL (ref 4.31–10.16)
WBC # BLD AUTO: 9 THOUSAND/UL (ref 4.31–10.16)
WBC # BLD AUTO: 9.44 THOUSAND/UL (ref 4.31–10.16)
WBC # BLD AUTO: 9.78 THOUSAND/UL (ref 4.31–10.16)
WBC #/AREA URNS AUTO: ABNORMAL /HPF
WBC #/AREA URNS AUTO: ABNORMAL /HPF

## 2020-01-01 PROCEDURE — 1036F TOBACCO NON-USER: CPT | Performed by: NURSE PRACTITIONER

## 2020-01-01 PROCEDURE — 99214 OFFICE O/P EST MOD 30 MIN: CPT | Performed by: INTERNAL MEDICINE

## 2020-01-01 PROCEDURE — 93306 TTE W/DOPPLER COMPLETE: CPT | Performed by: INTERNAL MEDICINE

## 2020-01-01 PROCEDURE — 99214 OFFICE O/P EST MOD 30 MIN: CPT | Performed by: NURSE PRACTITIONER

## 2020-01-01 PROCEDURE — 96372 THER/PROPH/DIAG INJ SC/IM: CPT

## 2020-01-01 PROCEDURE — 3078F DIAST BP <80 MM HG: CPT | Performed by: NURSE PRACTITIONER

## 2020-01-01 PROCEDURE — 36415 COLL VENOUS BLD VENIPUNCTURE: CPT

## 2020-01-01 PROCEDURE — 1160F RVW MEDS BY RX/DR IN RCRD: CPT | Performed by: PHYSICIAN ASSISTANT

## 2020-01-01 PROCEDURE — 97167 OT EVAL HIGH COMPLEX 60 MIN: CPT

## 2020-01-01 PROCEDURE — C1894 INTRO/SHEATH, NON-LASER: HCPCS | Performed by: UROLOGY

## 2020-01-01 PROCEDURE — 99496 TRANSJ CARE MGMT HIGH F2F 7D: CPT | Performed by: NURSE PRACTITIONER

## 2020-01-01 PROCEDURE — 83540 ASSAY OF IRON: CPT | Performed by: FAMILY MEDICINE

## 2020-01-01 PROCEDURE — 80048 BASIC METABOLIC PNL TOTAL CA: CPT | Performed by: INTERNAL MEDICINE

## 2020-01-01 PROCEDURE — 74018 RADEX ABDOMEN 1 VIEW: CPT

## 2020-01-01 PROCEDURE — 3008F BODY MASS INDEX DOCD: CPT | Performed by: INTERNAL MEDICINE

## 2020-01-01 PROCEDURE — 74176 CT ABD & PELVIS W/O CONTRAST: CPT

## 2020-01-01 PROCEDURE — 87505 NFCT AGENT DETECTION GI: CPT | Performed by: NURSE PRACTITIONER

## 2020-01-01 PROCEDURE — 83735 ASSAY OF MAGNESIUM: CPT

## 2020-01-01 PROCEDURE — 76770 US EXAM ABDO BACK WALL COMP: CPT

## 2020-01-01 PROCEDURE — 1111F DSCHRG MED/CURRENT MED MERGE: CPT | Performed by: NURSE PRACTITIONER

## 2020-01-01 PROCEDURE — 97530 THERAPEUTIC ACTIVITIES: CPT

## 2020-01-01 PROCEDURE — 93005 ELECTROCARDIOGRAM TRACING: CPT

## 2020-01-01 PROCEDURE — 84260 ASSAY OF SEROTONIN: CPT

## 2020-01-01 PROCEDURE — 93228 REMOTE 30 DAY ECG REV/REPORT: CPT | Performed by: INTERNAL MEDICINE

## 2020-01-01 PROCEDURE — 82668 ASSAY OF ERYTHROPOIETIN: CPT

## 2020-01-01 PROCEDURE — 1160F RVW MEDS BY RX/DR IN RCRD: CPT | Performed by: NURSE PRACTITIONER

## 2020-01-01 PROCEDURE — 70450 CT HEAD/BRAIN W/O DYE: CPT

## 2020-01-01 PROCEDURE — 99285 EMERGENCY DEPT VISIT HI MDM: CPT | Performed by: EMERGENCY MEDICINE

## 2020-01-01 PROCEDURE — 99285 EMERGENCY DEPT VISIT HI MDM: CPT | Performed by: PHYSICIAN ASSISTANT

## 2020-01-01 PROCEDURE — 80048 BASIC METABOLIC PNL TOTAL CA: CPT

## 2020-01-01 PROCEDURE — 80053 COMPREHEN METABOLIC PANEL: CPT | Performed by: PHYSICIAN ASSISTANT

## 2020-01-01 PROCEDURE — 99495 TRANSJ CARE MGMT MOD F2F 14D: CPT | Performed by: NURSE PRACTITIONER

## 2020-01-01 PROCEDURE — 83690 ASSAY OF LIPASE: CPT | Performed by: PHYSICIAN ASSISTANT

## 2020-01-01 PROCEDURE — 97535 SELF CARE MNGMENT TRAINING: CPT

## 2020-01-01 PROCEDURE — 85025 COMPLETE CBC W/AUTO DIFF WBC: CPT

## 2020-01-01 PROCEDURE — 1170F FXNL STATUS ASSESSED: CPT | Performed by: NURSE PRACTITIONER

## 2020-01-01 PROCEDURE — 99222 1ST HOSP IP/OBS MODERATE 55: CPT | Performed by: PSYCHIATRY & NEUROLOGY

## 2020-01-01 PROCEDURE — 70551 MRI BRAIN STEM W/O DYE: CPT

## 2020-01-01 PROCEDURE — 82948 REAGENT STRIP/BLOOD GLUCOSE: CPT

## 2020-01-01 PROCEDURE — 99233 SBSQ HOSP IP/OBS HIGH 50: CPT | Performed by: INTERNAL MEDICINE

## 2020-01-01 PROCEDURE — 99232 SBSQ HOSP IP/OBS MODERATE 35: CPT | Performed by: INTERNAL MEDICINE

## 2020-01-01 PROCEDURE — 85025 COMPLETE CBC W/AUTO DIFF WBC: CPT | Performed by: INTERNAL MEDICINE

## 2020-01-01 PROCEDURE — 3078F DIAST BP <80 MM HG: CPT | Performed by: PHYSICIAN ASSISTANT

## 2020-01-01 PROCEDURE — 86316 IMMUNOASSAY TUMOR OTHER: CPT

## 2020-01-01 PROCEDURE — G0438 PPPS, INITIAL VISIT: HCPCS | Performed by: NURSE PRACTITIONER

## 2020-01-01 PROCEDURE — 73030 X-RAY EXAM OF SHOULDER: CPT

## 2020-01-01 PROCEDURE — 84484 ASSAY OF TROPONIN QUANT: CPT | Performed by: EMERGENCY MEDICINE

## 2020-01-01 PROCEDURE — 1111F DSCHRG MED/CURRENT MED MERGE: CPT | Performed by: INTERNAL MEDICINE

## 2020-01-01 PROCEDURE — 85610 PROTHROMBIN TIME: CPT | Performed by: EMERGENCY MEDICINE

## 2020-01-01 PROCEDURE — C9113 INJ PANTOPRAZOLE SODIUM, VIA: HCPCS | Performed by: INTERNAL MEDICINE

## 2020-01-01 PROCEDURE — 36415 COLL VENOUS BLD VENIPUNCTURE: CPT | Performed by: EMERGENCY MEDICINE

## 2020-01-01 PROCEDURE — 93010 ELECTROCARDIOGRAM REPORT: CPT | Performed by: INTERNAL MEDICINE

## 2020-01-01 PROCEDURE — 82728 ASSAY OF FERRITIN: CPT

## 2020-01-01 PROCEDURE — 80053 COMPREHEN METABOLIC PANEL: CPT

## 2020-01-01 PROCEDURE — 88305 TISSUE EXAM BY PATHOLOGIST: CPT | Performed by: PATHOLOGY

## 2020-01-01 PROCEDURE — 83036 HEMOGLOBIN GLYCOSYLATED A1C: CPT | Performed by: PHYSICIAN ASSISTANT

## 2020-01-01 PROCEDURE — 99222 1ST HOSP IP/OBS MODERATE 55: CPT | Performed by: INTERNAL MEDICINE

## 2020-01-01 PROCEDURE — 83540 ASSAY OF IRON: CPT

## 2020-01-01 PROCEDURE — 99204 OFFICE O/P NEW MOD 45 MIN: CPT | Performed by: INTERNAL MEDICINE

## 2020-01-01 PROCEDURE — NC001 PR NO CHARGE: Performed by: UROLOGY

## 2020-01-01 PROCEDURE — 85025 COMPLETE CBC W/AUTO DIFF WBC: CPT | Performed by: NURSE PRACTITIONER

## 2020-01-01 PROCEDURE — 82746 ASSAY OF FOLIC ACID SERUM: CPT

## 2020-01-01 PROCEDURE — 93308 TTE F-UP OR LMTD: CPT | Performed by: INTERNAL MEDICINE

## 2020-01-01 PROCEDURE — 99203 OFFICE O/P NEW LOW 30 MIN: CPT | Performed by: ORTHOPAEDIC SURGERY

## 2020-01-01 PROCEDURE — 84145 PROCALCITONIN (PCT): CPT | Performed by: NURSE PRACTITIONER

## 2020-01-01 PROCEDURE — 99354 PR PROLONGED SVC OUTPATIENT SETTING 1ST HOUR: CPT | Performed by: INTERNAL MEDICINE

## 2020-01-01 PROCEDURE — 99232 SBSQ HOSP IP/OBS MODERATE 35: CPT | Performed by: PHYSICIAN ASSISTANT

## 2020-01-01 PROCEDURE — 80048 BASIC METABOLIC PNL TOTAL CA: CPT | Performed by: NURSE PRACTITIONER

## 2020-01-01 PROCEDURE — 3008F BODY MASS INDEX DOCD: CPT | Performed by: NURSE PRACTITIONER

## 2020-01-01 PROCEDURE — 97116 GAIT TRAINING THERAPY: CPT

## 2020-01-01 PROCEDURE — 83735 ASSAY OF MAGNESIUM: CPT | Performed by: INTERNAL MEDICINE

## 2020-01-01 PROCEDURE — 36415 COLL VENOUS BLD VENIPUNCTURE: CPT | Performed by: PHYSICIAN ASSISTANT

## 2020-01-01 PROCEDURE — 86140 C-REACTIVE PROTEIN: CPT

## 2020-01-01 PROCEDURE — 99223 1ST HOSP IP/OBS HIGH 75: CPT | Performed by: NURSE PRACTITIONER

## 2020-01-01 PROCEDURE — 87086 URINE CULTURE/COLONY COUNT: CPT | Performed by: EMERGENCY MEDICINE

## 2020-01-01 PROCEDURE — 85027 COMPLETE CBC AUTOMATED: CPT | Performed by: NURSE PRACTITIONER

## 2020-01-01 PROCEDURE — 97162 PT EVAL MOD COMPLEX 30 MIN: CPT

## 2020-01-01 PROCEDURE — 87086 URINE CULTURE/COLONY COUNT: CPT | Performed by: STUDENT IN AN ORGANIZED HEALTH CARE EDUCATION/TRAINING PROGRAM

## 2020-01-01 PROCEDURE — 97166 OT EVAL MOD COMPLEX 45 MIN: CPT

## 2020-01-01 PROCEDURE — 85025 COMPLETE CBC W/AUTO DIFF WBC: CPT | Performed by: EMERGENCY MEDICINE

## 2020-01-01 PROCEDURE — 3075F SYST BP GE 130 - 139MM HG: CPT | Performed by: NURSE PRACTITIONER

## 2020-01-01 PROCEDURE — 87181 SC STD AGAR DILUTION PER AGT: CPT | Performed by: STUDENT IN AN ORGANIZED HEALTH CARE EDUCATION/TRAINING PROGRAM

## 2020-01-01 PROCEDURE — 81001 URINALYSIS AUTO W/SCOPE: CPT | Performed by: EMERGENCY MEDICINE

## 2020-01-01 PROCEDURE — 85027 COMPLETE CBC AUTOMATED: CPT | Performed by: INTERNAL MEDICINE

## 2020-01-01 PROCEDURE — 85379 FIBRIN DEGRADATION QUANT: CPT | Performed by: PHYSICIAN ASSISTANT

## 2020-01-01 PROCEDURE — G1004 CDSM NDSC: HCPCS

## 2020-01-01 PROCEDURE — 80061 LIPID PANEL: CPT | Performed by: PHYSICIAN ASSISTANT

## 2020-01-01 PROCEDURE — 80048 BASIC METABOLIC PNL TOTAL CA: CPT | Performed by: EMERGENCY MEDICINE

## 2020-01-01 PROCEDURE — 99239 HOSP IP/OBS DSCHRG MGMT >30: CPT | Performed by: NURSE PRACTITIONER

## 2020-01-01 PROCEDURE — 3077F SYST BP >= 140 MM HG: CPT | Performed by: INTERNAL MEDICINE

## 2020-01-01 PROCEDURE — 97112 NEUROMUSCULAR REEDUCATION: CPT | Performed by: PHYSICAL THERAPIST

## 2020-01-01 PROCEDURE — 1170F FXNL STATUS ASSESSED: CPT | Performed by: INTERNAL MEDICINE

## 2020-01-01 PROCEDURE — 3079F DIAST BP 80-89 MM HG: CPT | Performed by: NURSE PRACTITIONER

## 2020-01-01 PROCEDURE — 3078F DIAST BP <80 MM HG: CPT | Performed by: INTERNAL MEDICINE

## 2020-01-01 PROCEDURE — 99213 OFFICE O/P EST LOW 20 MIN: CPT | Performed by: NURSE PRACTITIONER

## 2020-01-01 PROCEDURE — 3075F SYST BP GE 130 - 139MM HG: CPT | Performed by: INTERNAL MEDICINE

## 2020-01-01 PROCEDURE — 88312 SPECIAL STAINS GROUP 1: CPT | Performed by: PATHOLOGY

## 2020-01-01 PROCEDURE — 85045 AUTOMATED RETICULOCYTE COUNT: CPT | Performed by: INTERNAL MEDICINE

## 2020-01-01 PROCEDURE — 84484 ASSAY OF TROPONIN QUANT: CPT | Performed by: PHYSICIAN ASSISTANT

## 2020-01-01 PROCEDURE — 97163 PT EVAL HIGH COMPLEX 45 MIN: CPT

## 2020-01-01 PROCEDURE — 85045 AUTOMATED RETICULOCYTE COUNT: CPT

## 2020-01-01 PROCEDURE — 99285 EMERGENCY DEPT VISIT HI MDM: CPT

## 2020-01-01 PROCEDURE — 80076 HEPATIC FUNCTION PANEL: CPT | Performed by: EMERGENCY MEDICINE

## 2020-01-01 PROCEDURE — 99223 1ST HOSP IP/OBS HIGH 75: CPT | Performed by: INTERNAL MEDICINE

## 2020-01-01 PROCEDURE — 71045 X-RAY EXAM CHEST 1 VIEW: CPT

## 2020-01-01 PROCEDURE — 96365 THER/PROPH/DIAG IV INF INIT: CPT

## 2020-01-01 PROCEDURE — 80053 COMPREHEN METABOLIC PANEL: CPT | Performed by: INTERNAL MEDICINE

## 2020-01-01 PROCEDURE — 0241U HB NFCT DS VIR RESP RNA 4 TRGT: CPT | Performed by: PHYSICIAN ASSISTANT

## 2020-01-01 PROCEDURE — A9540 TC99M MAA: HCPCS

## 2020-01-01 PROCEDURE — 83550 IRON BINDING TEST: CPT | Performed by: FAMILY MEDICINE

## 2020-01-01 PROCEDURE — 85027 COMPLETE CBC AUTOMATED: CPT | Performed by: EMERGENCY MEDICINE

## 2020-01-01 PROCEDURE — 93325 DOPPLER ECHO COLOR FLOW MAPG: CPT | Performed by: INTERNAL MEDICINE

## 2020-01-01 PROCEDURE — 87102 FUNGUS ISOLATION CULTURE: CPT | Performed by: UROLOGY

## 2020-01-01 PROCEDURE — 96375 TX/PRO/DX INJ NEW DRUG ADDON: CPT

## 2020-01-01 PROCEDURE — 3008F BODY MASS INDEX DOCD: CPT | Performed by: PHYSICIAN ASSISTANT

## 2020-01-01 PROCEDURE — 83735 ASSAY OF MAGNESIUM: CPT | Performed by: PHYSICIAN ASSISTANT

## 2020-01-01 PROCEDURE — 1036F TOBACCO NON-USER: CPT | Performed by: INTERNAL MEDICINE

## 2020-01-01 PROCEDURE — 52352 CYSTOURETERO W/STONE REMOVE: CPT | Performed by: UROLOGY

## 2020-01-01 PROCEDURE — 1036F TOBACCO NON-USER: CPT | Performed by: PHYSICIAN ASSISTANT

## 2020-01-01 PROCEDURE — 87106 FUNGI IDENTIFICATION YEAST: CPT | Performed by: UROLOGY

## 2020-01-01 PROCEDURE — 99233 SBSQ HOSP IP/OBS HIGH 50: CPT | Performed by: NURSE PRACTITIONER

## 2020-01-01 PROCEDURE — 3077F SYST BP >= 140 MM HG: CPT | Performed by: NURSE PRACTITIONER

## 2020-01-01 PROCEDURE — 99232 SBSQ HOSP IP/OBS MODERATE 35: CPT | Performed by: NURSE PRACTITIONER

## 2020-01-01 PROCEDURE — 97110 THERAPEUTIC EXERCISES: CPT

## 2020-01-01 PROCEDURE — 82570 ASSAY OF URINE CREATININE: CPT

## 2020-01-01 PROCEDURE — 3074F SYST BP LT 130 MM HG: CPT | Performed by: NURSE PRACTITIONER

## 2020-01-01 PROCEDURE — 85025 COMPLETE CBC W/AUTO DIFF WBC: CPT | Performed by: PHYSICIAN ASSISTANT

## 2020-01-01 PROCEDURE — 83880 ASSAY OF NATRIURETIC PEPTIDE: CPT | Performed by: PHYSICIAN ASSISTANT

## 2020-01-01 PROCEDURE — 1160F RVW MEDS BY RX/DR IN RCRD: CPT | Performed by: INTERNAL MEDICINE

## 2020-01-01 PROCEDURE — 85049 AUTOMATED PLATELET COUNT: CPT | Performed by: INTERNAL MEDICINE

## 2020-01-01 PROCEDURE — 99239 HOSP IP/OBS DSCHRG MGMT >30: CPT | Performed by: STUDENT IN AN ORGANIZED HEALTH CARE EDUCATION/TRAINING PROGRAM

## 2020-01-01 PROCEDURE — 93308 TTE F-UP OR LMTD: CPT

## 2020-01-01 PROCEDURE — 80053 COMPREHEN METABOLIC PANEL: CPT | Performed by: EMERGENCY MEDICINE

## 2020-01-01 PROCEDURE — 87186 SC STD MICRODIL/AGAR DIL: CPT | Performed by: STUDENT IN AN ORGANIZED HEALTH CARE EDUCATION/TRAINING PROGRAM

## 2020-01-01 PROCEDURE — 3074F SYST BP LT 130 MM HG: CPT | Performed by: INTERNAL MEDICINE

## 2020-01-01 PROCEDURE — 85025 COMPLETE CBC W/AUTO DIFF WBC: CPT | Performed by: STUDENT IN AN ORGANIZED HEALTH CARE EDUCATION/TRAINING PROGRAM

## 2020-01-01 PROCEDURE — 83036 HEMOGLOBIN GLYCOSYLATED A1C: CPT | Performed by: NURSE PRACTITIONER

## 2020-01-01 PROCEDURE — 85730 THROMBOPLASTIN TIME PARTIAL: CPT | Performed by: PHYSICIAN ASSISTANT

## 2020-01-01 PROCEDURE — 83735 ASSAY OF MAGNESIUM: CPT | Performed by: NURSE PRACTITIONER

## 2020-01-01 PROCEDURE — 87077 CULTURE AEROBIC IDENTIFY: CPT | Performed by: STUDENT IN AN ORGANIZED HEALTH CARE EDUCATION/TRAINING PROGRAM

## 2020-01-01 PROCEDURE — C1769 GUIDE WIRE: HCPCS | Performed by: UROLOGY

## 2020-01-01 PROCEDURE — 96374 THER/PROPH/DIAG INJ IV PUSH: CPT

## 2020-01-01 PROCEDURE — C8929 TTE W OR WO FOL WCON,DOPPLER: HCPCS

## 2020-01-01 PROCEDURE — 84484 ASSAY OF TROPONIN QUANT: CPT | Performed by: INTERNAL MEDICINE

## 2020-01-01 PROCEDURE — 81001 URINALYSIS AUTO W/SCOPE: CPT | Performed by: STUDENT IN AN ORGANIZED HEALTH CARE EDUCATION/TRAINING PROGRAM

## 2020-01-01 PROCEDURE — 85652 RBC SED RATE AUTOMATED: CPT

## 2020-01-01 PROCEDURE — 83970 ASSAY OF PARATHORMONE: CPT

## 2020-01-01 PROCEDURE — 99239 HOSP IP/OBS DSCHRG MGMT >30: CPT | Performed by: INTERNAL MEDICINE

## 2020-01-01 PROCEDURE — 99223 1ST HOSP IP/OBS HIGH 75: CPT | Performed by: STUDENT IN AN ORGANIZED HEALTH CARE EDUCATION/TRAINING PROGRAM

## 2020-01-01 PROCEDURE — 80048 BASIC METABOLIC PNL TOTAL CA: CPT | Performed by: STUDENT IN AN ORGANIZED HEALTH CARE EDUCATION/TRAINING PROGRAM

## 2020-01-01 PROCEDURE — 82043 UR ALBUMIN QUANTITATIVE: CPT

## 2020-01-01 PROCEDURE — 83605 ASSAY OF LACTIC ACID: CPT | Performed by: PHYSICIAN ASSISTANT

## 2020-01-01 PROCEDURE — 52332 CYSTOSCOPY AND TREATMENT: CPT | Performed by: UROLOGY

## 2020-01-01 PROCEDURE — 87635 SARS-COV-2 COVID-19 AMP PRB: CPT | Performed by: EMERGENCY MEDICINE

## 2020-01-01 PROCEDURE — 1111F DSCHRG MED/CURRENT MED MERGE: CPT | Performed by: PHYSICIAN ASSISTANT

## 2020-01-01 PROCEDURE — 83550 IRON BINDING TEST: CPT

## 2020-01-01 PROCEDURE — 84100 ASSAY OF PHOSPHORUS: CPT | Performed by: INTERNAL MEDICINE

## 2020-01-01 PROCEDURE — 84100 ASSAY OF PHOSPHORUS: CPT | Performed by: NURSE PRACTITIONER

## 2020-01-01 PROCEDURE — 80048 BASIC METABOLIC PNL TOTAL CA: CPT | Performed by: PHYSICIAN ASSISTANT

## 2020-01-01 PROCEDURE — 84100 ASSAY OF PHOSPHORUS: CPT

## 2020-01-01 PROCEDURE — 84443 ASSAY THYROID STIM HORMONE: CPT | Performed by: INTERNAL MEDICINE

## 2020-01-01 PROCEDURE — 3074F SYST BP LT 130 MM HG: CPT | Performed by: PHYSICIAN ASSISTANT

## 2020-01-01 PROCEDURE — 84443 ASSAY THYROID STIM HORMONE: CPT | Performed by: EMERGENCY MEDICINE

## 2020-01-01 PROCEDURE — V2630 ANTER CHAMBER INTRAOCUL LENS: HCPCS | Performed by: ANESTHESIOLOGY

## 2020-01-01 PROCEDURE — 99239 HOSP IP/OBS DSCHRG MGMT >30: CPT | Performed by: FAMILY MEDICINE

## 2020-01-01 PROCEDURE — 1125F AMNT PAIN NOTED PAIN PRSNT: CPT | Performed by: NURSE PRACTITIONER

## 2020-01-01 PROCEDURE — 73221 MRI JOINT UPR EXTREM W/O DYE: CPT

## 2020-01-01 PROCEDURE — 83615 LACTATE (LD) (LDH) ENZYME: CPT

## 2020-01-01 PROCEDURE — 99215 OFFICE O/P EST HI 40 MIN: CPT | Performed by: INTERNAL MEDICINE

## 2020-01-01 PROCEDURE — 74420 UROGRAPHY RTRGR +-KUB: CPT

## 2020-01-01 PROCEDURE — 85730 THROMBOPLASTIN TIME PARTIAL: CPT | Performed by: EMERGENCY MEDICINE

## 2020-01-01 PROCEDURE — 71250 CT THORAX DX C-: CPT

## 2020-01-01 PROCEDURE — 96361 HYDRATE IV INFUSION ADD-ON: CPT

## 2020-01-01 PROCEDURE — 99220 PR INITIAL OBSERVATION CARE/DAY 70 MINUTES: CPT | Performed by: INTERNAL MEDICINE

## 2020-01-01 PROCEDURE — 82607 VITAMIN B-12: CPT

## 2020-01-01 PROCEDURE — 87040 BLOOD CULTURE FOR BACTERIA: CPT | Performed by: PHYSICIAN ASSISTANT

## 2020-01-01 PROCEDURE — 70490 CT SOFT TISSUE NECK W/O DYE: CPT

## 2020-01-01 PROCEDURE — 87493 C DIFF AMPLIFIED PROBE: CPT | Performed by: NURSE PRACTITIONER

## 2020-01-01 PROCEDURE — 78580 LUNG PERFUSION IMAGING: CPT

## 2020-01-01 PROCEDURE — 99214 OFFICE O/P EST MOD 30 MIN: CPT | Performed by: PHYSICIAN ASSISTANT

## 2020-01-01 PROCEDURE — C2617 STENT, NON-COR, TEM W/O DEL: HCPCS | Performed by: UROLOGY

## 2020-01-01 PROCEDURE — 82728 ASSAY OF FERRITIN: CPT | Performed by: FAMILY MEDICINE

## 2020-01-01 PROCEDURE — 97112 NEUROMUSCULAR REEDUCATION: CPT

## 2020-01-01 PROCEDURE — 93321 DOPPLER ECHO F-UP/LMTD STD: CPT | Performed by: INTERNAL MEDICINE

## 2020-01-01 DEVICE — IMPLANTABLE DEVICE: Type: IMPLANTABLE DEVICE | Status: FUNCTIONAL

## 2020-01-01 DEVICE — INLAY OPTIMA URETERAL STENT W/O GUIDEWIRE
Type: IMPLANTABLE DEVICE | Site: URETER | Status: FUNCTIONAL
Brand: BARD® INLAY OPTIMA® URETERAL STENT

## 2020-01-01 RX ORDER — CHOLECALCIFEROL (VITAMIN D3) 10 MCG
1 TABLET ORAL
Status: DISCONTINUED | OUTPATIENT
Start: 2020-01-01 | End: 2020-01-01 | Stop reason: HOSPADM

## 2020-01-01 RX ORDER — CEFEPIME HYDROCHLORIDE 2 G/50ML
2000 INJECTION, SOLUTION INTRAVENOUS ONCE
Status: COMPLETED | OUTPATIENT
Start: 2020-01-01 | End: 2020-01-01

## 2020-01-01 RX ORDER — LANREOTIDE ACETATE 120 MG/.5ML
120 INJECTION SUBCUTANEOUS ONCE
Status: COMPLETED | OUTPATIENT
Start: 2020-01-01 | End: 2020-01-01

## 2020-01-01 RX ORDER — SODIUM CHLORIDE, SODIUM LACTATE, POTASSIUM CHLORIDE, CALCIUM CHLORIDE 600; 310; 30; 20 MG/100ML; MG/100ML; MG/100ML; MG/100ML
125 INJECTION, SOLUTION INTRAVENOUS CONTINUOUS
Status: DISCONTINUED | OUTPATIENT
Start: 2020-01-01 | End: 2020-01-01 | Stop reason: HOSPADM

## 2020-01-01 RX ORDER — FENTANYL CITRATE 50 UG/ML
25 INJECTION, SOLUTION INTRAMUSCULAR; INTRAVENOUS ONCE
Status: COMPLETED | OUTPATIENT
Start: 2020-01-01 | End: 2020-01-01

## 2020-01-01 RX ORDER — COLCHICINE 0.6 MG/1
0.3 TABLET ORAL DAILY
Status: DISCONTINUED | OUTPATIENT
Start: 2020-01-01 | End: 2020-01-01 | Stop reason: HOSPADM

## 2020-01-01 RX ORDER — ASPIRIN 81 MG/1
81 TABLET ORAL DAILY
Status: DISCONTINUED | OUTPATIENT
Start: 2020-01-01 | End: 2020-01-01 | Stop reason: HOSPADM

## 2020-01-01 RX ORDER — FAMOTIDINE 20 MG/1
40 TABLET, FILM COATED ORAL
Status: DISCONTINUED | OUTPATIENT
Start: 2020-01-01 | End: 2020-01-01

## 2020-01-01 RX ORDER — COLCHICINE 0.6 MG/1
0.6 TABLET ORAL DAILY
Status: DISCONTINUED | OUTPATIENT
Start: 2020-01-01 | End: 2020-01-01

## 2020-01-01 RX ORDER — SODIUM CHLORIDE, SODIUM LACTATE, POTASSIUM CHLORIDE, CALCIUM CHLORIDE 600; 310; 30; 20 MG/100ML; MG/100ML; MG/100ML; MG/100ML
20 INJECTION, SOLUTION INTRAVENOUS CONTINUOUS
Status: DISCONTINUED | OUTPATIENT
Start: 2020-01-01 | End: 2020-01-01 | Stop reason: HOSPADM

## 2020-01-01 RX ORDER — SODIUM BICARBONATE 650 MG/1
1300 TABLET ORAL
Status: DISCONTINUED | OUTPATIENT
Start: 2020-01-01 | End: 2020-01-01 | Stop reason: HOSPADM

## 2020-01-01 RX ORDER — COLCHICINE 0.6 MG/1
0.6 TABLET ORAL DAILY
Qty: 90 TABLET | Refills: 3 | Status: SHIPPED | OUTPATIENT
Start: 2020-01-01 | End: 2020-01-01

## 2020-01-01 RX ORDER — AMLODIPINE BESYLATE 10 MG/1
TABLET ORAL DAILY
COMMUNITY
Start: 2020-01-01 | End: 2020-01-01

## 2020-01-01 RX ORDER — FENTANYL CITRATE 50 UG/ML
INJECTION, SOLUTION INTRAMUSCULAR; INTRAVENOUS AS NEEDED
Status: DISCONTINUED | OUTPATIENT
Start: 2020-01-01 | End: 2020-01-01 | Stop reason: SURG

## 2020-01-01 RX ORDER — FUROSEMIDE 20 MG/1
20 TABLET ORAL ONCE
Status: DISCONTINUED | OUTPATIENT
Start: 2020-01-01 | End: 2020-01-01

## 2020-01-01 RX ORDER — DOCUSATE SODIUM 100 MG/1
100 CAPSULE, LIQUID FILLED ORAL 2 TIMES DAILY
Status: DISCONTINUED | OUTPATIENT
Start: 2020-01-01 | End: 2020-01-01 | Stop reason: HOSPADM

## 2020-01-01 RX ORDER — MAGNESIUM HYDROXIDE 1200 MG/15ML
LIQUID ORAL AS NEEDED
Status: DISCONTINUED | OUTPATIENT
Start: 2020-01-01 | End: 2020-01-01 | Stop reason: HOSPADM

## 2020-01-01 RX ORDER — LIDOCAINE 50 MG/G
1 PATCH TOPICAL ONCE
Status: COMPLETED | OUTPATIENT
Start: 2020-01-01 | End: 2020-01-01

## 2020-01-01 RX ORDER — MOXIFLOXACIN IN NACL,ISO-OS/PF 0.3MG/0.3
0.3 SYRINGE (ML) INTRAOCULAR ONCE
Status: CANCELLED | OUTPATIENT
Start: 2020-01-01

## 2020-01-01 RX ORDER — LANREOTIDE ACETATE 120 MG/.5ML
120 INJECTION SUBCUTANEOUS ONCE
Status: CANCELLED | OUTPATIENT
Start: 2020-01-01

## 2020-01-01 RX ORDER — FAMOTIDINE 10 MG
10 TABLET ORAL
Qty: 30 TABLET | Refills: 0 | Status: SHIPPED | OUTPATIENT
Start: 2020-01-01 | End: 2021-01-01 | Stop reason: HOSPADM

## 2020-01-01 RX ORDER — SODIUM CHLORIDE 9 MG/ML
75 INJECTION, SOLUTION INTRAVENOUS ONCE
Status: COMPLETED | OUTPATIENT
Start: 2020-01-01 | End: 2020-01-01

## 2020-01-01 RX ORDER — MECLIZINE HCL 12.5 MG/1
25 TABLET ORAL EVERY 8 HOURS SCHEDULED
Status: DISCONTINUED | OUTPATIENT
Start: 2020-01-01 | End: 2020-01-01 | Stop reason: HOSPADM

## 2020-01-01 RX ORDER — DOCUSATE SODIUM 100 MG/1
100 CAPSULE, LIQUID FILLED ORAL 2 TIMES DAILY
Qty: 30 CAPSULE | Refills: 0 | Status: SHIPPED | OUTPATIENT
Start: 2020-01-01 | End: 2020-01-01

## 2020-01-01 RX ORDER — ONDANSETRON 2 MG/ML
4 INJECTION INTRAMUSCULAR; INTRAVENOUS ONCE AS NEEDED
Status: DISCONTINUED | OUTPATIENT
Start: 2020-01-01 | End: 2020-01-01 | Stop reason: HOSPADM

## 2020-01-01 RX ORDER — SODIUM CHLORIDE 9 MG/ML
3 INJECTION INTRAVENOUS
Status: DISCONTINUED | OUTPATIENT
Start: 2020-01-01 | End: 2020-01-01 | Stop reason: HOSPADM

## 2020-01-01 RX ORDER — PROPOFOL 10 MG/ML
INJECTION, EMULSION INTRAVENOUS AS NEEDED
Status: DISCONTINUED | OUTPATIENT
Start: 2020-01-01 | End: 2020-01-01 | Stop reason: SURG

## 2020-01-01 RX ORDER — FENTANYL CITRATE 50 UG/ML
INJECTION, SOLUTION INTRAMUSCULAR; INTRAVENOUS AS NEEDED
Status: DISCONTINUED | OUTPATIENT
Start: 2020-01-01 | End: 2020-01-01

## 2020-01-01 RX ORDER — NIFEDIPINE 30 MG/1
60 TABLET, EXTENDED RELEASE ORAL DAILY
Status: DISCONTINUED | OUTPATIENT
Start: 2020-01-01 | End: 2020-01-01 | Stop reason: HOSPADM

## 2020-01-01 RX ORDER — COLCHICINE 0.6 MG/1
0.6 TABLET ORAL DAILY
Start: 2020-01-01 | End: 2020-01-01 | Stop reason: SDUPTHER

## 2020-01-01 RX ORDER — HEPARIN SODIUM 5000 [USP'U]/ML
5000 INJECTION, SOLUTION INTRAVENOUS; SUBCUTANEOUS EVERY 8 HOURS SCHEDULED
Status: DISCONTINUED | OUTPATIENT
Start: 2020-01-01 | End: 2020-01-01 | Stop reason: HOSPADM

## 2020-01-01 RX ORDER — PANTOPRAZOLE SODIUM 40 MG/1
40 TABLET, DELAYED RELEASE ORAL DAILY
Qty: 90 TABLET | Refills: 1 | Status: SHIPPED | OUTPATIENT
Start: 2020-01-01

## 2020-01-01 RX ORDER — PANTOPRAZOLE SODIUM 40 MG/1
40 TABLET, DELAYED RELEASE ORAL
Status: DISCONTINUED | OUTPATIENT
Start: 2020-01-01 | End: 2020-01-01

## 2020-01-01 RX ORDER — ATORVASTATIN CALCIUM 40 MG/1
40 TABLET, FILM COATED ORAL EVERY EVENING
Status: DISCONTINUED | OUTPATIENT
Start: 2020-01-01 | End: 2020-01-01 | Stop reason: HOSPADM

## 2020-01-01 RX ORDER — POTASSIUM CHLORIDE 20 MEQ/1
20 TABLET, EXTENDED RELEASE ORAL ONCE
Status: COMPLETED | OUTPATIENT
Start: 2020-01-01 | End: 2020-01-01

## 2020-01-01 RX ORDER — FAMOTIDINE 20 MG/1
10 TABLET, FILM COATED ORAL
Status: DISCONTINUED | OUTPATIENT
Start: 2020-01-01 | End: 2020-01-01 | Stop reason: HOSPADM

## 2020-01-01 RX ORDER — PANTOPRAZOLE SODIUM 40 MG/1
40 TABLET, DELAYED RELEASE ORAL
Status: DISCONTINUED | OUTPATIENT
Start: 2020-01-01 | End: 2020-01-01 | Stop reason: HOSPADM

## 2020-01-01 RX ORDER — HEPARIN SODIUM 10000 [USP'U]/100ML
3-20 INJECTION, SOLUTION INTRAVENOUS
Status: DISCONTINUED | OUTPATIENT
Start: 2020-01-01 | End: 2020-01-01

## 2020-01-01 RX ORDER — CHOLESTYRAMINE LIGHT 4 G/5.7G
4 POWDER, FOR SUSPENSION ORAL 2 TIMES DAILY
Status: DISCONTINUED | OUTPATIENT
Start: 2020-01-01 | End: 2020-01-01 | Stop reason: HOSPADM

## 2020-01-01 RX ORDER — ONDANSETRON 2 MG/ML
4 INJECTION INTRAMUSCULAR; INTRAVENOUS ONCE
Status: COMPLETED | OUTPATIENT
Start: 2020-01-01 | End: 2020-01-01

## 2020-01-01 RX ORDER — B COMPLEX, C NO.20/FOLIC ACID 1 MG
CAPSULE ORAL
Qty: 60 EACH | Refills: 0 | OUTPATIENT
Start: 2020-01-01

## 2020-01-01 RX ORDER — ACETAMINOPHEN 325 MG/1
650 TABLET ORAL EVERY 6 HOURS PRN
Status: DISCONTINUED | OUTPATIENT
Start: 2020-01-01 | End: 2020-01-01 | Stop reason: HOSPADM

## 2020-01-01 RX ORDER — COLCHICINE 0.6 MG/1
0.3 TABLET ORAL DAILY
Qty: 90 TABLET | Refills: 1 | Status: SHIPPED | OUTPATIENT
Start: 2020-01-01 | End: 2021-01-01

## 2020-01-01 RX ORDER — NYSTATIN 100000 [USP'U]/G
POWDER TOPICAL 2 TIMES DAILY
Status: DISCONTINUED | OUTPATIENT
Start: 2020-01-01 | End: 2020-01-01 | Stop reason: HOSPADM

## 2020-01-01 RX ORDER — TETRACAINE HYDROCHLORIDE 5 MG/ML
SOLUTION OPHTHALMIC AS NEEDED
Status: DISCONTINUED | OUTPATIENT
Start: 2020-01-01 | End: 2020-01-01 | Stop reason: HOSPADM

## 2020-01-01 RX ORDER — HYDROCODONE BITARTRATE AND ACETAMINOPHEN 5; 325 MG/1; MG/1
1 TABLET ORAL EVERY 6 HOURS PRN
Qty: 5 TABLET | Refills: 0 | Status: SHIPPED | OUTPATIENT
Start: 2020-01-01 | End: 2020-01-01

## 2020-01-01 RX ORDER — BALANCED SALT SOLUTION 6.4; .75; .48; .3; 3.9; 1.7 MG/ML; MG/ML; MG/ML; MG/ML; MG/ML; MG/ML
SOLUTION OPHTHALMIC AS NEEDED
Status: DISCONTINUED | OUTPATIENT
Start: 2020-01-01 | End: 2020-01-01 | Stop reason: HOSPADM

## 2020-01-01 RX ORDER — LABETALOL 20 MG/4 ML (5 MG/ML) INTRAVENOUS SYRINGE
10 EVERY 4 HOURS PRN
Status: DISCONTINUED | OUTPATIENT
Start: 2020-01-01 | End: 2020-01-01 | Stop reason: HOSPADM

## 2020-01-01 RX ORDER — PROPOFOL 10 MG/ML
INJECTION, EMULSION INTRAVENOUS AS NEEDED
Status: DISCONTINUED | OUTPATIENT
Start: 2020-01-01 | End: 2020-01-01

## 2020-01-01 RX ORDER — TAMSULOSIN HYDROCHLORIDE 0.4 MG/1
0.4 CAPSULE ORAL
Status: DISCONTINUED | OUTPATIENT
Start: 2020-01-01 | End: 2020-01-01 | Stop reason: HOSPADM

## 2020-01-01 RX ORDER — SODIUM CHLORIDE 9 MG/ML
50 INJECTION, SOLUTION INTRAVENOUS CONTINUOUS
Status: DISPENSED | OUTPATIENT
Start: 2020-01-01 | End: 2020-01-01

## 2020-01-01 RX ORDER — ASPIRIN 81 MG/1
81 TABLET, CHEWABLE ORAL DAILY
Status: DISCONTINUED | OUTPATIENT
Start: 2020-01-01 | End: 2020-01-01 | Stop reason: HOSPADM

## 2020-01-01 RX ORDER — LIDOCAINE HYDROCHLORIDE 20 MG/ML
15 SOLUTION OROPHARYNGEAL 4 TIMES DAILY PRN
Status: DISCONTINUED | OUTPATIENT
Start: 2020-01-01 | End: 2020-01-01 | Stop reason: HOSPADM

## 2020-01-01 RX ORDER — TROPICAMIDE 10 MG/ML
1 SOLUTION/ DROPS OPHTHALMIC
Status: COMPLETED | OUTPATIENT
Start: 2020-01-01 | End: 2020-01-01

## 2020-01-01 RX ORDER — B COMPLEX, C NO.20/FOLIC ACID 1 MG
1 CAPSULE ORAL
Qty: 30 EACH | Refills: 5 | Status: SHIPPED | OUTPATIENT
Start: 2020-01-01 | End: 2021-01-01 | Stop reason: SDUPTHER

## 2020-01-01 RX ORDER — SODIUM CHLORIDE, SODIUM LACTATE, POTASSIUM CHLORIDE, CALCIUM CHLORIDE 600; 310; 30; 20 MG/100ML; MG/100ML; MG/100ML; MG/100ML
INJECTION, SOLUTION INTRAVENOUS CONTINUOUS PRN
Status: DISCONTINUED | OUTPATIENT
Start: 2020-01-01 | End: 2020-01-01 | Stop reason: SURG

## 2020-01-01 RX ORDER — DEXAMETHASONE SODIUM PHOSPHATE 10 MG/ML
INJECTION, SOLUTION INTRAMUSCULAR; INTRAVENOUS AS NEEDED
Status: DISCONTINUED | OUTPATIENT
Start: 2020-01-01 | End: 2020-01-01 | Stop reason: SURG

## 2020-01-01 RX ORDER — MAGNESIUM HYDROXIDE/ALUMINUM HYDROXICE/SIMETHICONE 120; 1200; 1200 MG/30ML; MG/30ML; MG/30ML
30 SUSPENSION ORAL EVERY 6 HOURS PRN
Status: DISCONTINUED | OUTPATIENT
Start: 2020-01-01 | End: 2020-01-01 | Stop reason: HOSPADM

## 2020-01-01 RX ORDER — FLUCONAZOLE 200 MG/1
200 TABLET ORAL DAILY
Qty: 14 TABLET | Refills: 0 | Status: SHIPPED | OUTPATIENT
Start: 2020-01-01 | End: 2020-01-01

## 2020-01-01 RX ORDER — FUROSEMIDE 40 MG/1
40 TABLET ORAL DAILY
Qty: 30 TABLET | Refills: 5 | Status: SHIPPED | OUTPATIENT
Start: 2020-01-01 | End: 2021-01-01 | Stop reason: HOSPADM

## 2020-01-01 RX ORDER — HEPARIN SODIUM 1000 [USP'U]/ML
4000 INJECTION, SOLUTION INTRAVENOUS; SUBCUTANEOUS
Status: DISCONTINUED | OUTPATIENT
Start: 2020-01-01 | End: 2020-01-01

## 2020-01-01 RX ORDER — DIAZEPAM 5 MG/ML
2.5 INJECTION, SOLUTION INTRAMUSCULAR; INTRAVENOUS ONCE
Status: DISCONTINUED | OUTPATIENT
Start: 2020-01-01 | End: 2020-01-01

## 2020-01-01 RX ORDER — FUROSEMIDE 40 MG/1
40 TABLET ORAL DAILY
Status: DISCONTINUED | OUTPATIENT
Start: 2020-01-01 | End: 2020-01-01

## 2020-01-01 RX ORDER — FUROSEMIDE 40 MG/1
40 TABLET ORAL DAILY
Status: DISCONTINUED | OUTPATIENT
Start: 2020-01-01 | End: 2020-01-01 | Stop reason: HOSPADM

## 2020-01-01 RX ORDER — ONDANSETRON 2 MG/ML
INJECTION INTRAMUSCULAR; INTRAVENOUS
Status: COMPLETED
Start: 2020-01-01 | End: 2020-01-01

## 2020-01-01 RX ORDER — CHOLESTYRAMINE LIGHT 4 G/5.7G
4 POWDER, FOR SUSPENSION ORAL 2 TIMES DAILY
Qty: 60 PACKET | Refills: 0 | Status: SHIPPED | OUTPATIENT
Start: 2020-01-01 | End: 2020-01-01

## 2020-01-01 RX ORDER — FENTANYL CITRATE 50 UG/ML
25 INJECTION, SOLUTION INTRAMUSCULAR; INTRAVENOUS ONCE
Status: DISCONTINUED | OUTPATIENT
Start: 2020-01-01 | End: 2020-01-01

## 2020-01-01 RX ORDER — MELATONIN
1000 DAILY
Status: DISCONTINUED | OUTPATIENT
Start: 2020-01-01 | End: 2020-01-01 | Stop reason: HOSPADM

## 2020-01-01 RX ORDER — ACETAMINOPHEN 325 MG/1
975 TABLET ORAL EVERY 6 HOURS PRN
Status: DISCONTINUED | OUTPATIENT
Start: 2020-01-01 | End: 2020-01-01 | Stop reason: HOSPADM

## 2020-01-01 RX ORDER — COLCHICINE 0.6 MG/1
0.6 TABLET ORAL DAILY
Status: DISCONTINUED | OUTPATIENT
Start: 2020-01-01 | End: 2020-01-01 | Stop reason: HOSPADM

## 2020-01-01 RX ORDER — SODIUM BICARBONATE 650 MG/1
1300 TABLET ORAL
Qty: 180 TABLET | Refills: 0 | Status: SHIPPED | OUTPATIENT
Start: 2020-01-01 | End: 2020-01-01

## 2020-01-01 RX ORDER — AMLODIPINE BESYLATE 10 MG/1
10 TABLET ORAL DAILY
Status: DISCONTINUED | OUTPATIENT
Start: 2020-01-01 | End: 2020-01-01 | Stop reason: HOSPADM

## 2020-01-01 RX ORDER — MECLIZINE HYDROCHLORIDE 25 MG/1
25 TABLET ORAL EVERY 12 HOURS PRN
Qty: 60 TABLET | Refills: 0 | Status: ON HOLD | OUTPATIENT
Start: 2020-01-01 | End: 2020-01-01 | Stop reason: SDUPTHER

## 2020-01-01 RX ORDER — SUCRALFATE 1 G/1
1 TABLET ORAL
Status: DISCONTINUED | OUTPATIENT
Start: 2020-01-01 | End: 2020-01-01 | Stop reason: HOSPADM

## 2020-01-01 RX ORDER — ONDANSETRON 2 MG/ML
4 INJECTION INTRAMUSCULAR; INTRAVENOUS EVERY 6 HOURS PRN
Status: DISCONTINUED | OUTPATIENT
Start: 2020-01-01 | End: 2020-01-01 | Stop reason: HOSPADM

## 2020-01-01 RX ORDER — MECLIZINE HYDROCHLORIDE 25 MG/1
25 TABLET ORAL EVERY 8 HOURS PRN
Qty: 90 TABLET | Refills: 0 | Status: SHIPPED | OUTPATIENT
Start: 2020-01-01 | End: 2021-01-01 | Stop reason: CLARIF

## 2020-01-01 RX ORDER — OXYCODONE HYDROCHLORIDE 5 MG/1
5 TABLET ORAL EVERY 4 HOURS PRN
Status: DISCONTINUED | OUTPATIENT
Start: 2020-01-01 | End: 2020-01-01 | Stop reason: HOSPADM

## 2020-01-01 RX ORDER — HEPARIN SODIUM 1000 [USP'U]/ML
2000 INJECTION, SOLUTION INTRAVENOUS; SUBCUTANEOUS
Status: DISCONTINUED | OUTPATIENT
Start: 2020-01-01 | End: 2020-01-01

## 2020-01-01 RX ORDER — SODIUM BICARBONATE 650 MG/1
325 TABLET ORAL 2 TIMES DAILY
COMMUNITY
End: 2021-01-01 | Stop reason: HOSPADM

## 2020-01-01 RX ORDER — TAMSULOSIN HYDROCHLORIDE 0.4 MG/1
0.4 CAPSULE ORAL
Qty: 90 CAPSULE | Refills: 3 | Status: SHIPPED | OUTPATIENT
Start: 2020-01-01 | End: 2021-01-01 | Stop reason: SDUPTHER

## 2020-01-01 RX ORDER — ONDANSETRON 2 MG/ML
INJECTION INTRAMUSCULAR; INTRAVENOUS AS NEEDED
Status: DISCONTINUED | OUTPATIENT
Start: 2020-01-01 | End: 2020-01-01 | Stop reason: SURG

## 2020-01-01 RX ORDER — NIFEDIPINE 60 MG/1
60 TABLET, FILM COATED, EXTENDED RELEASE ORAL DAILY
Qty: 30 TABLET | Refills: 3 | Status: SHIPPED | OUTPATIENT
Start: 2020-01-01 | End: 2021-01-01

## 2020-01-01 RX ORDER — SODIUM CHLORIDE 9 MG/ML
50 INJECTION, SOLUTION INTRAVENOUS CONTINUOUS
Status: DISCONTINUED | OUTPATIENT
Start: 2020-01-01 | End: 2020-01-01 | Stop reason: HOSPADM

## 2020-01-01 RX ORDER — SIMETHICONE 80 MG
80 TABLET,CHEWABLE ORAL EVERY 6 HOURS PRN
Status: DISCONTINUED | OUTPATIENT
Start: 2020-01-01 | End: 2020-01-01 | Stop reason: HOSPADM

## 2020-01-01 RX ORDER — SODIUM CHLORIDE 9 MG/ML
75 INJECTION, SOLUTION INTRAVENOUS CONTINUOUS
Status: DISPENSED | OUTPATIENT
Start: 2020-01-01 | End: 2020-01-01

## 2020-01-01 RX ORDER — SUCRALFATE ORAL 1 G/10ML
1000 SUSPENSION ORAL EVERY 6 HOURS SCHEDULED
Status: DISCONTINUED | OUTPATIENT
Start: 2020-01-01 | End: 2020-01-01 | Stop reason: HOSPADM

## 2020-01-01 RX ORDER — MECLIZINE HYDROCHLORIDE 25 MG/1
25 TABLET ORAL EVERY 12 HOURS PRN
Status: DISCONTINUED | OUTPATIENT
Start: 2020-01-01 | End: 2020-01-01 | Stop reason: HOSPADM

## 2020-01-01 RX ORDER — OFLOXACIN 3 MG/ML
SOLUTION/ DROPS OPHTHALMIC 4 TIMES DAILY
COMMUNITY
Start: 2020-01-01 | End: 2021-01-01 | Stop reason: CLARIF

## 2020-01-01 RX ORDER — ONDANSETRON 2 MG/ML
1 INJECTION INTRAMUSCULAR; INTRAVENOUS ONCE
Status: COMPLETED | OUTPATIENT
Start: 2020-01-01 | End: 2020-01-01

## 2020-01-01 RX ORDER — LIDOCAINE 50 MG/G
1 PATCH TOPICAL DAILY
Qty: 30 PATCH | Refills: 0 | Status: SHIPPED | OUTPATIENT
Start: 2020-01-01 | End: 2020-01-01

## 2020-01-01 RX ORDER — SUCRALFATE ORAL 1 G/10ML
1000 SUSPENSION ORAL EVERY 6 HOURS SCHEDULED
Qty: 420 ML | Refills: 0 | Status: SHIPPED | OUTPATIENT
Start: 2020-01-01 | End: 2021-01-01 | Stop reason: HOSPADM

## 2020-01-01 RX ORDER — OFLOXACIN 3 MG/ML
1 SOLUTION/ DROPS OPHTHALMIC 4 TIMES DAILY
Status: DISCONTINUED | OUTPATIENT
Start: 2020-01-01 | End: 2020-01-01 | Stop reason: HOSPADM

## 2020-01-01 RX ORDER — CEPHALEXIN 500 MG/1
500 CAPSULE ORAL EVERY 24 HOURS
Qty: 3 CAPSULE | Refills: 0 | Status: SHIPPED | OUTPATIENT
Start: 2020-01-01 | End: 2020-01-01

## 2020-01-01 RX ORDER — PREDNISOLONE ACETATE 10 MG/ML
SUSPENSION/ DROPS OPHTHALMIC 4 TIMES DAILY
COMMUNITY
Start: 2020-01-01 | End: 2021-01-01 | Stop reason: CLARIF

## 2020-01-01 RX ORDER — LIDOCAINE HYDROCHLORIDE 10 MG/ML
INJECTION, SOLUTION EPIDURAL; INFILTRATION; INTRACAUDAL; PERINEURAL AS NEEDED
Status: DISCONTINUED | OUTPATIENT
Start: 2020-01-01 | End: 2020-01-01 | Stop reason: HOSPADM

## 2020-01-01 RX ORDER — SODIUM BICARBONATE 650 MG/1
325 TABLET ORAL 2 TIMES DAILY
Status: DISCONTINUED | OUTPATIENT
Start: 2020-01-01 | End: 2020-01-01 | Stop reason: HOSPADM

## 2020-01-01 RX ORDER — ACETAMINOPHEN 325 MG/1
650 TABLET ORAL ONCE
Status: COMPLETED | OUTPATIENT
Start: 2020-01-01 | End: 2020-01-01

## 2020-01-01 RX ORDER — FLURBIPROFEN SODIUM 0.3 MG/ML
1 SOLUTION/ DROPS OPHTHALMIC ONCE
Status: COMPLETED | OUTPATIENT
Start: 2020-01-01 | End: 2020-01-01

## 2020-01-01 RX ORDER — VANCOMYCIN HYDROCHLORIDE 125 MG/1
125 CAPSULE ORAL 4 TIMES DAILY
Qty: 36 CAPSULE | Refills: 0 | Status: SHIPPED | OUTPATIENT
Start: 2020-01-01 | End: 2020-01-01

## 2020-01-01 RX ORDER — SODIUM BICARBONATE 650 MG/1
650 TABLET ORAL
Status: DISCONTINUED | OUTPATIENT
Start: 2020-01-01 | End: 2020-01-01 | Stop reason: HOSPADM

## 2020-01-01 RX ORDER — PREDNISOLONE ACETATE 10 MG/ML
1 SUSPENSION/ DROPS OPHTHALMIC 4 TIMES DAILY
Status: DISCONTINUED | OUTPATIENT
Start: 2020-01-01 | End: 2020-01-01 | Stop reason: HOSPADM

## 2020-01-01 RX ORDER — PHENYLEPHRINE HCL 2.5 %
1 DROPS OPHTHALMIC (EYE)
Status: COMPLETED | OUTPATIENT
Start: 2020-01-01 | End: 2020-01-01

## 2020-01-01 RX ORDER — NIFEDIPINE 60 MG/1
60 TABLET, FILM COATED, EXTENDED RELEASE ORAL DAILY
Qty: 30 TABLET | Refills: 3 | Status: SHIPPED | OUTPATIENT
Start: 2020-01-01 | End: 2020-01-01 | Stop reason: SDUPTHER

## 2020-01-01 RX ORDER — TOBRAMYCIN 3 MG/ML
SOLUTION/ DROPS OPHTHALMIC AS NEEDED
Status: DISCONTINUED | OUTPATIENT
Start: 2020-01-01 | End: 2020-01-01 | Stop reason: HOSPADM

## 2020-01-01 RX ORDER — EPHEDRINE SULFATE 50 MG/ML
INJECTION INTRAVENOUS AS NEEDED
Status: DISCONTINUED | OUTPATIENT
Start: 2020-01-01 | End: 2020-01-01 | Stop reason: SURG

## 2020-01-01 RX ORDER — PANTOPRAZOLE SODIUM 40 MG/1
40 TABLET, DELAYED RELEASE ORAL 2 TIMES DAILY
Qty: 90 TABLET | Refills: 0 | Status: SHIPPED | OUTPATIENT
Start: 2020-01-01 | End: 2020-01-01 | Stop reason: SDUPTHER

## 2020-01-01 RX ORDER — LIDOCAINE HYDROCHLORIDE 20 MG/ML
INJECTION, SOLUTION EPIDURAL; INFILTRATION; INTRACAUDAL; PERINEURAL AS NEEDED
Status: DISCONTINUED | OUTPATIENT
Start: 2020-01-01 | End: 2020-01-01 | Stop reason: SURG

## 2020-01-01 RX ORDER — PHENAZOPYRIDINE HYDROCHLORIDE 200 MG/1
200 TABLET, FILM COATED ORAL 3 TIMES DAILY PRN
Qty: 10 TABLET | Refills: 0 | Status: SHIPPED | OUTPATIENT
Start: 2020-01-01 | End: 2020-01-01

## 2020-01-01 RX ORDER — DIAZEPAM 5 MG/ML
2.5 INJECTION, SOLUTION INTRAMUSCULAR; INTRAVENOUS ONCE
Status: COMPLETED | OUTPATIENT
Start: 2020-01-01 | End: 2020-01-01

## 2020-01-01 RX ORDER — INSULIN DETEMIR 100 [IU]/ML
5 INJECTION, SOLUTION SUBCUTANEOUS
Refills: 0
Start: 2020-01-01 | End: 2021-01-01

## 2020-01-01 RX ORDER — PANTOPRAZOLE SODIUM 40 MG/1
40 INJECTION, POWDER, FOR SOLUTION INTRAVENOUS EVERY 12 HOURS SCHEDULED
Status: DISCONTINUED | OUTPATIENT
Start: 2020-01-01 | End: 2020-01-01 | Stop reason: HOSPADM

## 2020-01-01 RX ORDER — LANOLIN ALCOHOL/MO/W.PET/CERES
100 CREAM (GRAM) TOPICAL DAILY
COMMUNITY

## 2020-01-01 RX ORDER — FENTANYL CITRATE/PF 50 MCG/ML
50 SYRINGE (ML) INJECTION
Status: DISCONTINUED | OUTPATIENT
Start: 2020-01-01 | End: 2020-01-01 | Stop reason: HOSPADM

## 2020-01-01 RX ORDER — LIDOCAINE HYDROCHLORIDE 20 MG/ML
15 SOLUTION OROPHARYNGEAL ONCE
Status: COMPLETED | OUTPATIENT
Start: 2020-01-01 | End: 2020-01-01

## 2020-01-01 RX ORDER — SODIUM CHLORIDE, SODIUM LACTATE, POTASSIUM CHLORIDE, CALCIUM CHLORIDE 600; 310; 30; 20 MG/100ML; MG/100ML; MG/100ML; MG/100ML
50 INJECTION, SOLUTION INTRAVENOUS CONTINUOUS
Status: CANCELLED | OUTPATIENT
Start: 2020-01-01

## 2020-01-01 RX ADMIN — VITAMIN D, TAB 1000IU (100/BT) 1000 UNITS: 25 TAB at 10:06

## 2020-01-01 RX ADMIN — LANREOTIDE ACETATE 120 MG: 120 INJECTION SUBCUTANEOUS at 12:53

## 2020-01-01 RX ADMIN — LANREOTIDE ACETATE 120 MG: 120 INJECTION SUBCUTANEOUS at 13:41

## 2020-01-01 RX ADMIN — HEPARIN SODIUM 5000 UNITS: 5000 INJECTION INTRAVENOUS; SUBCUTANEOUS at 21:11

## 2020-01-01 RX ADMIN — Medication 1 CAPSULE: at 17:44

## 2020-01-01 RX ADMIN — CHOLESTYRAMINE 4 G: 4 POWDER, FOR SUSPENSION ORAL at 12:09

## 2020-01-01 RX ADMIN — METOPROLOL TARTRATE 12.5 MG: 25 TABLET ORAL at 10:05

## 2020-01-01 RX ADMIN — LANREOTIDE ACETATE 120 MG: 120 INJECTION SUBCUTANEOUS at 12:09

## 2020-01-01 RX ADMIN — MECLIZINE 25 MG: 12.5 TABLET ORAL at 05:40

## 2020-01-01 RX ADMIN — MECLIZINE 25 MG: 12.5 TABLET ORAL at 23:00

## 2020-01-01 RX ADMIN — NIFEDIPINE 60 MG: 30 TABLET, EXTENDED RELEASE ORAL at 09:30

## 2020-01-01 RX ADMIN — PANTOPRAZOLE SODIUM 40 MG: 40 TABLET, DELAYED RELEASE ORAL at 05:25

## 2020-01-01 RX ADMIN — VITAMIN D, TAB 1000IU (100/BT) 1000 UNITS: 25 TAB at 08:44

## 2020-01-01 RX ADMIN — FUROSEMIDE 40 MG: 40 TABLET ORAL at 10:05

## 2020-01-01 RX ADMIN — INSULIN LISPRO 4 UNITS: 100 INJECTION, SOLUTION INTRAVENOUS; SUBCUTANEOUS at 19:07

## 2020-01-01 RX ADMIN — MAGNESIUM GLUCONATE 500 MG ORAL TABLET 200 MG: 500 TABLET ORAL at 08:21

## 2020-01-01 RX ADMIN — AMLODIPINE BESYLATE 10 MG: 10 TABLET ORAL at 08:39

## 2020-01-01 RX ADMIN — FENTANYL CITRATE 25 MCG: 50 INJECTION INTRAMUSCULAR; INTRAVENOUS at 13:48

## 2020-01-01 RX ADMIN — TAMSULOSIN HYDROCHLORIDE 0.4 MG: 0.4 CAPSULE ORAL at 16:47

## 2020-01-01 RX ADMIN — PHENYLEPHRINE HYDROCHLORIDE 1 DROP: 2.5 SOLUTION/ DROPS OPHTHALMIC at 10:55

## 2020-01-01 RX ADMIN — METOPROLOL TARTRATE 12.5 MG: 25 TABLET ORAL at 08:43

## 2020-01-01 RX ADMIN — INSULIN LISPRO 2 UNITS: 100 INJECTION, SOLUTION INTRAVENOUS; SUBCUTANEOUS at 17:47

## 2020-01-01 RX ADMIN — HEPARIN SODIUM 5000 UNITS: 5000 INJECTION INTRAVENOUS; SUBCUTANEOUS at 22:21

## 2020-01-01 RX ADMIN — INSULIN LISPRO 4 UNITS: 100 INJECTION, SOLUTION INTRAVENOUS; SUBCUTANEOUS at 08:40

## 2020-01-01 RX ADMIN — MECLIZINE 25 MG: 12.5 TABLET ORAL at 06:14

## 2020-01-01 RX ADMIN — INSULIN DETEMIR 5 UNITS: 100 INJECTION, SOLUTION SUBCUTANEOUS at 09:49

## 2020-01-01 RX ADMIN — HEPARIN SODIUM 5000 UNITS: 5000 INJECTION INTRAVENOUS; SUBCUTANEOUS at 18:00

## 2020-01-01 RX ADMIN — PANTOPRAZOLE SODIUM 40 MG: 40 TABLET, DELAYED RELEASE ORAL at 06:14

## 2020-01-01 RX ADMIN — INSULIN LISPRO 4 UNITS: 100 INJECTION, SOLUTION INTRAVENOUS; SUBCUTANEOUS at 17:46

## 2020-01-01 RX ADMIN — Medication 125 MG: at 07:48

## 2020-01-01 RX ADMIN — PANTOPRAZOLE SODIUM 40 MG: 40 INJECTION, POWDER, FOR SOLUTION INTRAVENOUS at 09:49

## 2020-01-01 RX ADMIN — NYSTATIN: 100000 POWDER TOPICAL at 08:40

## 2020-01-01 RX ADMIN — HEPARIN SODIUM 5000 UNITS: 5000 INJECTION INTRAVENOUS; SUBCUTANEOUS at 21:04

## 2020-01-01 RX ADMIN — PROPOFOL 10 MG: 10 INJECTION, EMULSION INTRAVENOUS at 14:21

## 2020-01-01 RX ADMIN — INSULIN LISPRO 4 UNITS: 100 INJECTION, SOLUTION INTRAVENOUS; SUBCUTANEOUS at 12:13

## 2020-01-01 RX ADMIN — PREDNISOLONE ACETATE 1 DROP: 10 SUSPENSION/ DROPS OPHTHALMIC at 08:22

## 2020-01-01 RX ADMIN — SODIUM CHLORIDE 50 ML/HR: 0.9 INJECTION, SOLUTION INTRAVENOUS at 12:02

## 2020-01-01 RX ADMIN — METOPROLOL TARTRATE 12.5 MG: 25 TABLET ORAL at 21:11

## 2020-01-01 RX ADMIN — METOPROLOL TARTRATE 12.5 MG: 25 TABLET ORAL at 08:04

## 2020-01-01 RX ADMIN — Medication 1 CAPSULE: at 17:46

## 2020-01-01 RX ADMIN — INSULIN LISPRO 4 UNITS: 100 INJECTION, SOLUTION INTRAVENOUS; SUBCUTANEOUS at 12:12

## 2020-01-01 RX ADMIN — FENTANYL CITRATE 25 MCG: 50 INJECTION INTRAMUSCULAR; INTRAVENOUS at 13:51

## 2020-01-01 RX ADMIN — NIFEDIPINE 60 MG: 30 TABLET, EXTENDED RELEASE ORAL at 10:05

## 2020-01-01 RX ADMIN — TAMSULOSIN HYDROCHLORIDE 0.4 MG: 0.4 CAPSULE ORAL at 16:19

## 2020-01-01 RX ADMIN — SODIUM BICARBONATE 650 MG TABLET 650 MG: at 12:35

## 2020-01-01 RX ADMIN — HEPARIN SODIUM 5000 UNITS: 5000 INJECTION INTRAVENOUS; SUBCUTANEOUS at 22:55

## 2020-01-01 RX ADMIN — SODIUM BICARBONATE 650 MG TABLET 650 MG: at 11:35

## 2020-01-01 RX ADMIN — TAMSULOSIN HYDROCHLORIDE 0.4 MG: 0.4 CAPSULE ORAL at 17:44

## 2020-01-01 RX ADMIN — SUCRALFATE 1 G: 1 TABLET ORAL at 08:24

## 2020-01-01 RX ADMIN — AMLODIPINE BESYLATE 10 MG: 10 TABLET ORAL at 08:04

## 2020-01-01 RX ADMIN — PANTOPRAZOLE SODIUM 40 MG: 40 TABLET, DELAYED RELEASE ORAL at 06:27

## 2020-01-01 RX ADMIN — ONDANSETRON 4 MG: 2 INJECTION INTRAMUSCULAR; INTRAVENOUS at 15:11

## 2020-01-01 RX ADMIN — PHENYLEPHRINE HYDROCHLORIDE 1 DROP: 2.5 SOLUTION/ DROPS OPHTHALMIC at 10:45

## 2020-01-01 RX ADMIN — ASPIRIN 81 MG CHEWABLE TABLET 81 MG: 81 TABLET CHEWABLE at 09:23

## 2020-01-01 RX ADMIN — METOPROLOL TARTRATE 12.5 MG: 25 TABLET ORAL at 08:29

## 2020-01-01 RX ADMIN — PERFLUTREN 0.4 ML/MIN: 6.52 INJECTION, SUSPENSION INTRAVENOUS at 13:43

## 2020-01-01 RX ADMIN — INSULIN LISPRO 1 UNITS: 100 INJECTION, SOLUTION INTRAVENOUS; SUBCUTANEOUS at 12:00

## 2020-01-01 RX ADMIN — TROPICAMIDE 1 DROP: 10 SOLUTION/ DROPS OPHTHALMIC at 10:44

## 2020-01-01 RX ADMIN — SODIUM CHLORIDE 50 ML/HR: 0.9 INJECTION, SOLUTION INTRAVENOUS at 08:08

## 2020-01-01 RX ADMIN — MAGNESIUM GLUCONATE 500 MG ORAL TABLET 400 MG: 500 TABLET ORAL at 22:21

## 2020-01-01 RX ADMIN — LANREOTIDE ACETATE 120 MG: 120 INJECTION SUBCUTANEOUS at 13:05

## 2020-01-01 RX ADMIN — SUCRALFATE 1 G: 1 TABLET ORAL at 22:54

## 2020-01-01 RX ADMIN — HEPARIN SODIUM 5000 UNITS: 5000 INJECTION INTRAVENOUS; SUBCUTANEOUS at 05:22

## 2020-01-01 RX ADMIN — HEPARIN SODIUM 5000 UNITS: 5000 INJECTION INTRAVENOUS; SUBCUTANEOUS at 23:00

## 2020-01-01 RX ADMIN — SODIUM CHLORIDE 50 ML/HR: 0.9 INJECTION, SOLUTION INTRAVENOUS at 15:23

## 2020-01-01 RX ADMIN — DOCUSATE SODIUM 100 MG: 100 CAPSULE, LIQUID FILLED ORAL at 18:15

## 2020-01-01 RX ADMIN — NYSTATIN: 100000 POWDER TOPICAL at 08:29

## 2020-01-01 RX ADMIN — SODIUM BICARBONATE 650 MG TABLET 650 MG: at 06:28

## 2020-01-01 RX ADMIN — FUROSEMIDE 40 MG: 40 TABLET ORAL at 08:41

## 2020-01-01 RX ADMIN — TROPICAMIDE 1 DROP: 10 SOLUTION/ DROPS OPHTHALMIC at 10:54

## 2020-01-01 RX ADMIN — PANTOPRAZOLE SODIUM 40 MG: 40 TABLET, DELAYED RELEASE ORAL at 05:07

## 2020-01-01 RX ADMIN — PANTOPRAZOLE SODIUM 40 MG: 40 TABLET, DELAYED RELEASE ORAL at 06:12

## 2020-01-01 RX ADMIN — INSULIN LISPRO 2 UNITS: 100 INJECTION, SOLUTION INTRAVENOUS; SUBCUTANEOUS at 23:00

## 2020-01-01 RX ADMIN — FENTANYL CITRATE 25 MCG: 50 INJECTION, SOLUTION INTRAMUSCULAR; INTRAVENOUS at 15:19

## 2020-01-01 RX ADMIN — ASPIRIN 81 MG: 81 TABLET, COATED ORAL at 09:30

## 2020-01-01 RX ADMIN — SODIUM CHLORIDE, SODIUM LACTATE, POTASSIUM CHLORIDE, AND CALCIUM CHLORIDE: .6; .31; .03; .02 INJECTION, SOLUTION INTRAVENOUS at 14:28

## 2020-01-01 RX ADMIN — MECLIZINE 25 MG: 12.5 TABLET ORAL at 22:55

## 2020-01-01 RX ADMIN — INSULIN DETEMIR 5 UNITS: 100 INJECTION, SOLUTION SUBCUTANEOUS at 21:12

## 2020-01-01 RX ADMIN — PANTOPRAZOLE SODIUM 40 MG: 40 INJECTION, POWDER, FOR SOLUTION INTRAVENOUS at 09:07

## 2020-01-01 RX ADMIN — TROPICAMIDE 1 DROP: 10 SOLUTION/ DROPS OPHTHALMIC at 11:05

## 2020-01-01 RX ADMIN — INSULIN LISPRO 4 UNITS: 100 INJECTION, SOLUTION INTRAVENOUS; SUBCUTANEOUS at 12:01

## 2020-01-01 RX ADMIN — FAMOTIDINE 10 MG: 20 TABLET, FILM COATED ORAL at 22:54

## 2020-01-01 RX ADMIN — NIFEDIPINE 60 MG: 30 TABLET, EXTENDED RELEASE ORAL at 09:01

## 2020-01-01 RX ADMIN — INSULIN LISPRO 3 UNITS: 100 INJECTION, SOLUTION INTRAVENOUS; SUBCUTANEOUS at 17:10

## 2020-01-01 RX ADMIN — SODIUM BICARBONATE 325 MG: 650 TABLET ORAL at 08:20

## 2020-01-01 RX ADMIN — POTASSIUM CHLORIDE 20 MEQ: 1500 TABLET, EXTENDED RELEASE ORAL at 02:16

## 2020-01-01 RX ADMIN — FUROSEMIDE 40 MG: 40 TABLET ORAL at 08:44

## 2020-01-01 RX ADMIN — PROPOFOL 10 MG: 10 INJECTION, EMULSION INTRAVENOUS at 14:09

## 2020-01-01 RX ADMIN — MAGNESIUM GLUCONATE 500 MG ORAL TABLET 400 MG: 500 TABLET ORAL at 09:30

## 2020-01-01 RX ADMIN — SUCRALFATE 1 G: 1 TABLET ORAL at 23:00

## 2020-01-01 RX ADMIN — SODIUM BICARBONATE 650 MG TABLET 650 MG: at 06:32

## 2020-01-01 RX ADMIN — LIDOCAINE HYDROCHLORIDE 15 ML: 20 SOLUTION ORAL; TOPICAL at 18:41

## 2020-01-01 RX ADMIN — SODIUM BICARBONATE 650 MG TABLET 650 MG: at 12:30

## 2020-01-01 RX ADMIN — PANTOPRAZOLE SODIUM 40 MG: 40 TABLET, DELAYED RELEASE ORAL at 06:28

## 2020-01-01 RX ADMIN — SODIUM CHLORIDE 75 ML/HR: 0.9 INJECTION, SOLUTION INTRAVENOUS at 17:28

## 2020-01-01 RX ADMIN — MAGNESIUM GLUCONATE 500 MG ORAL TABLET 400 MG: 500 TABLET ORAL at 08:40

## 2020-01-01 RX ADMIN — METOPROLOL TARTRATE 12.5 MG: 25 TABLET ORAL at 21:25

## 2020-01-01 RX ADMIN — INSULIN LISPRO 4 UNITS: 100 INJECTION, SOLUTION INTRAVENOUS; SUBCUTANEOUS at 12:14

## 2020-01-01 RX ADMIN — INSULIN LISPRO 4 UNITS: 100 INJECTION, SOLUTION INTRAVENOUS; SUBCUTANEOUS at 12:25

## 2020-01-01 RX ADMIN — Medication 1 CAPSULE: at 17:25

## 2020-01-01 RX ADMIN — METOPROLOL TARTRATE 12.5 MG: 25 TABLET ORAL at 21:32

## 2020-01-01 RX ADMIN — METOPROLOL TARTRATE 12.5 MG: 25 TABLET ORAL at 08:40

## 2020-01-01 RX ADMIN — CEFEPIME HYDROCHLORIDE 2000 MG: 2 INJECTION, SOLUTION INTRAVENOUS at 11:42

## 2020-01-01 RX ADMIN — SODIUM BICARBONATE 650 MG TABLET 1300 MG: at 08:04

## 2020-01-01 RX ADMIN — FAMOTIDINE 10 MG: 20 TABLET, FILM COATED ORAL at 23:00

## 2020-01-01 RX ADMIN — INSULIN LISPRO 1 UNITS: 100 INJECTION, SOLUTION INTRAVENOUS; SUBCUTANEOUS at 16:47

## 2020-01-01 RX ADMIN — PROPOFOL 10 MG: 10 INJECTION, EMULSION INTRAVENOUS at 14:05

## 2020-01-01 RX ADMIN — MAGNESIUM GLUCONATE 500 MG ORAL TABLET 400 MG: 500 TABLET ORAL at 17:46

## 2020-01-01 RX ADMIN — HEPARIN SODIUM 5000 UNITS: 5000 INJECTION INTRAVENOUS; SUBCUTANEOUS at 09:54

## 2020-01-01 RX ADMIN — INSULIN LISPRO 4 UNITS: 100 INJECTION, SOLUTION INTRAVENOUS; SUBCUTANEOUS at 16:47

## 2020-01-01 RX ADMIN — SODIUM CHLORIDE, SODIUM LACTATE, POTASSIUM CHLORIDE, AND CALCIUM CHLORIDE 125 ML/HR: .6; .31; .03; .02 INJECTION, SOLUTION INTRAVENOUS at 11:03

## 2020-01-01 RX ADMIN — METOPROLOL TARTRATE 12.5 MG: 25 TABLET ORAL at 08:44

## 2020-01-01 RX ADMIN — FENTANYL CITRATE 50 MCG: 50 INJECTION INTRAMUSCULAR; INTRAVENOUS at 13:16

## 2020-01-01 RX ADMIN — FUROSEMIDE 40 MG: 40 TABLET ORAL at 09:30

## 2020-01-01 RX ADMIN — OFLOXACIN 1 DROP: 3 SOLUTION/ DROPS OPHTHALMIC at 00:30

## 2020-01-01 RX ADMIN — SODIUM BICARBONATE 650 MG TABLET 650 MG: at 17:46

## 2020-01-01 RX ADMIN — SODIUM CHLORIDE 1000 ML: 0.9 INJECTION, SOLUTION INTRAVENOUS at 11:40

## 2020-01-01 RX ADMIN — MAGNESIUM GLUCONATE 500 MG ORAL TABLET 200 MG: 500 TABLET ORAL at 17:28

## 2020-01-01 RX ADMIN — FENTANYL CITRATE 25 MCG: 50 INJECTION INTRAMUSCULAR; INTRAVENOUS at 17:59

## 2020-01-01 RX ADMIN — ASPIRIN 81 MG: 81 TABLET, COATED ORAL at 08:02

## 2020-01-01 RX ADMIN — INSULIN LISPRO 3 UNITS: 100 INJECTION, SOLUTION INTRAVENOUS; SUBCUTANEOUS at 21:35

## 2020-01-01 RX ADMIN — PROPOFOL 30 MG: 10 INJECTION, EMULSION INTRAVENOUS at 15:07

## 2020-01-01 RX ADMIN — SODIUM BICARBONATE 650 MG TABLET 650 MG: at 06:40

## 2020-01-01 RX ADMIN — LANREOTIDE ACETATE 120 MG: 120 INJECTION SUBCUTANEOUS at 14:17

## 2020-01-01 RX ADMIN — Medication 1 CAPSULE: at 16:47

## 2020-01-01 RX ADMIN — SODIUM BICARBONATE 650 MG TABLET 650 MG: at 05:55

## 2020-01-01 RX ADMIN — INSULIN LISPRO 2 UNITS: 100 INJECTION, SOLUTION INTRAVENOUS; SUBCUTANEOUS at 21:11

## 2020-01-01 RX ADMIN — ASPIRIN 81 MG: 81 TABLET, COATED ORAL at 10:05

## 2020-01-01 RX ADMIN — INSULIN LISPRO 4 UNITS: 100 INJECTION, SOLUTION INTRAVENOUS; SUBCUTANEOUS at 11:47

## 2020-01-01 RX ADMIN — NYSTATIN: 100000 POWDER TOPICAL at 08:06

## 2020-01-01 RX ADMIN — HEPARIN SODIUM 5000 UNITS: 5000 INJECTION INTRAVENOUS; SUBCUTANEOUS at 05:40

## 2020-01-01 RX ADMIN — SODIUM BICARBONATE 325 MG: 650 TABLET ORAL at 09:23

## 2020-01-01 RX ADMIN — NYSTATIN: 100000 POWDER TOPICAL at 18:30

## 2020-01-01 RX ADMIN — INSULIN LISPRO 4 UNITS: 100 INJECTION, SOLUTION INTRAVENOUS; SUBCUTANEOUS at 12:29

## 2020-01-01 RX ADMIN — SUCRALFATE 1 G: 1 TABLET ORAL at 17:28

## 2020-01-01 RX ADMIN — MAGNESIUM GLUCONATE 500 MG ORAL TABLET 400 MG: 500 TABLET ORAL at 17:54

## 2020-01-01 RX ADMIN — INSULIN DETEMIR 10 UNITS: 100 INJECTION, SOLUTION SUBCUTANEOUS at 22:35

## 2020-01-01 RX ADMIN — SODIUM CHLORIDE 50 ML/HR: 0.9 INJECTION, SOLUTION INTRAVENOUS at 09:57

## 2020-01-01 RX ADMIN — INSULIN DETEMIR 10 UNITS: 100 INJECTION, SOLUTION SUBCUTANEOUS at 22:54

## 2020-01-01 RX ADMIN — PROPOFOL 10 MG: 10 INJECTION, EMULSION INTRAVENOUS at 14:07

## 2020-01-01 RX ADMIN — OFLOXACIN 1 DROP: 3 SOLUTION/ DROPS OPHTHALMIC at 11:58

## 2020-01-01 RX ADMIN — NIFEDIPINE 60 MG: 30 TABLET, EXTENDED RELEASE ORAL at 08:44

## 2020-01-01 RX ADMIN — COLCHICINE 0.6 MG: 0.6 TABLET, FILM COATED ORAL at 08:21

## 2020-01-01 RX ADMIN — SUCRALFATE 1 G: 1 TABLET ORAL at 06:15

## 2020-01-01 RX ADMIN — INSULIN LISPRO 2 UNITS: 100 INJECTION, SOLUTION INTRAVENOUS; SUBCUTANEOUS at 12:22

## 2020-01-01 RX ADMIN — TAMSULOSIN HYDROCHLORIDE 0.4 MG: 0.4 CAPSULE ORAL at 19:07

## 2020-01-01 RX ADMIN — LIDOCAINE HYDROCHLORIDE 15 ML: 20 SOLUTION ORAL; TOPICAL at 00:53

## 2020-01-01 RX ADMIN — SODIUM CHLORIDE 50 ML/HR: 0.9 INJECTION, SOLUTION INTRAVENOUS at 07:16

## 2020-01-01 RX ADMIN — INSULIN LISPRO 4 UNITS: 100 INJECTION, SOLUTION INTRAVENOUS; SUBCUTANEOUS at 22:08

## 2020-01-01 RX ADMIN — INSULIN LISPRO 4 UNITS: 100 INJECTION, SOLUTION INTRAVENOUS; SUBCUTANEOUS at 16:08

## 2020-01-01 RX ADMIN — PANTOPRAZOLE SODIUM 40 MG: 40 TABLET, DELAYED RELEASE ORAL at 06:15

## 2020-01-01 RX ADMIN — HEPARIN SODIUM 5000 UNITS: 5000 INJECTION INTRAVENOUS; SUBCUTANEOUS at 14:09

## 2020-01-01 RX ADMIN — DEXAMETHASONE SODIUM PHOSPHATE 10 MG: 10 INJECTION, SOLUTION INTRAMUSCULAR; INTRAVENOUS at 15:11

## 2020-01-01 RX ADMIN — ASPIRIN 81 MG 81 MG: 81 TABLET ORAL at 08:29

## 2020-01-01 RX ADMIN — TAMSULOSIN HYDROCHLORIDE 0.4 MG: 0.4 CAPSULE ORAL at 17:46

## 2020-01-01 RX ADMIN — HEPARIN SODIUM 5000 UNITS: 5000 INJECTION INTRAVENOUS; SUBCUTANEOUS at 17:09

## 2020-01-01 RX ADMIN — HEPARIN SODIUM 5000 UNITS: 5000 INJECTION INTRAVENOUS; SUBCUTANEOUS at 15:50

## 2020-01-01 RX ADMIN — SODIUM BICARBONATE 650 MG TABLET 1300 MG: at 08:39

## 2020-01-01 RX ADMIN — MAGNESIUM GLUCONATE 500 MG ORAL TABLET 400 MG: 500 TABLET ORAL at 08:02

## 2020-01-01 RX ADMIN — METOPROLOL TARTRATE 12.5 MG: 25 TABLET ORAL at 21:14

## 2020-01-01 RX ADMIN — PANTOPRAZOLE SODIUM 40 MG: 40 INJECTION, POWDER, FOR SOLUTION INTRAVENOUS at 21:25

## 2020-01-01 RX ADMIN — PANTOPRAZOLE SODIUM 40 MG: 40 INJECTION, POWDER, FOR SOLUTION INTRAVENOUS at 21:04

## 2020-01-01 RX ADMIN — ACETAMINOPHEN 650 MG: 325 TABLET ORAL at 13:35

## 2020-01-01 RX ADMIN — PROPOFOL 10 MG: 10 INJECTION, EMULSION INTRAVENOUS at 14:13

## 2020-01-01 RX ADMIN — PERFLUTREN 0.6 ML/MIN: 6.52 INJECTION, SUSPENSION INTRAVENOUS at 11:47

## 2020-01-01 RX ADMIN — HEPARIN SODIUM 5000 UNITS: 5000 INJECTION INTRAVENOUS; SUBCUTANEOUS at 21:26

## 2020-01-01 RX ADMIN — SUCRALFATE 1 G: 1 TABLET ORAL at 22:21

## 2020-01-01 RX ADMIN — ASPIRIN 81 MG: 81 TABLET, COATED ORAL at 08:41

## 2020-01-01 RX ADMIN — HEPARIN SODIUM 5000 UNITS: 5000 INJECTION INTRAVENOUS; SUBCUTANEOUS at 05:25

## 2020-01-01 RX ADMIN — HEPARIN SODIUM 5000 UNITS: 5000 INJECTION INTRAVENOUS; SUBCUTANEOUS at 06:40

## 2020-01-01 RX ADMIN — SUCRALFATE 1 G: 1 TABLET ORAL at 11:58

## 2020-01-01 RX ADMIN — DOCUSATE SODIUM 100 MG: 100 CAPSULE, LIQUID FILLED ORAL at 08:29

## 2020-01-01 RX ADMIN — VITAMIN D, TAB 1000IU (100/BT) 1000 UNITS: 25 TAB at 08:41

## 2020-01-01 RX ADMIN — MAGNESIUM GLUCONATE 500 MG ORAL TABLET 400 MG: 500 TABLET ORAL at 08:41

## 2020-01-01 RX ADMIN — INSULIN LISPRO 3 UNITS: 100 INJECTION, SOLUTION INTRAVENOUS; SUBCUTANEOUS at 21:26

## 2020-01-01 RX ADMIN — NYSTATIN: 100000 POWDER TOPICAL at 23:12

## 2020-01-01 RX ADMIN — SODIUM CHLORIDE, SODIUM LACTATE, POTASSIUM CHLORIDE, AND CALCIUM CHLORIDE: .6; .31; .03; .02 INJECTION, SOLUTION INTRAVENOUS at 12:40

## 2020-01-01 RX ADMIN — POTASSIUM CHLORIDE 20 MEQ: 1500 TABLET, EXTENDED RELEASE ORAL at 09:22

## 2020-01-01 RX ADMIN — SUCRALFATE 1000 MG: 1 SUSPENSION ORAL at 12:09

## 2020-01-01 RX ADMIN — MAGNESIUM GLUCONATE 500 MG ORAL TABLET 400 MG: 500 TABLET ORAL at 18:57

## 2020-01-01 RX ADMIN — SUCRALFATE 1 G: 1 TABLET ORAL at 16:19

## 2020-01-01 RX ADMIN — SODIUM BICARBONATE 650 MG TABLET 1300 MG: at 16:47

## 2020-01-01 RX ADMIN — Medication 1 CAPSULE: at 16:19

## 2020-01-01 RX ADMIN — TAMSULOSIN HYDROCHLORIDE 0.4 MG: 0.4 CAPSULE ORAL at 17:25

## 2020-01-01 RX ADMIN — Medication 125 MG: at 23:12

## 2020-01-01 RX ADMIN — MECLIZINE 25 MG: 12.5 TABLET ORAL at 16:19

## 2020-01-01 RX ADMIN — SUCRALFATE 1 G: 1 TABLET ORAL at 11:35

## 2020-01-01 RX ADMIN — SODIUM BICARBONATE 650 MG TABLET 650 MG: at 12:12

## 2020-01-01 RX ADMIN — ASPIRIN 81 MG CHEWABLE TABLET 81 MG: 81 TABLET CHEWABLE at 08:20

## 2020-01-01 RX ADMIN — SODIUM BICARBONATE 650 MG TABLET 650 MG: at 15:50

## 2020-01-01 RX ADMIN — INSULIN LISPRO 2 UNITS: 100 INJECTION, SOLUTION INTRAVENOUS; SUBCUTANEOUS at 23:21

## 2020-01-01 RX ADMIN — INSULIN LISPRO 1 UNITS: 100 INJECTION, SOLUTION INTRAVENOUS; SUBCUTANEOUS at 22:35

## 2020-01-01 RX ADMIN — ASPIRIN 81 MG: 81 TABLET, COATED ORAL at 08:44

## 2020-01-01 RX ADMIN — PROPOFOL 10 MG: 10 INJECTION, EMULSION INTRAVENOUS at 14:29

## 2020-01-01 RX ADMIN — INSULIN LISPRO 1 UNITS: 100 INJECTION, SOLUTION INTRAVENOUS; SUBCUTANEOUS at 21:26

## 2020-01-01 RX ADMIN — SODIUM BICARBONATE 650 MG TABLET 650 MG: at 12:09

## 2020-01-01 RX ADMIN — PREDNISOLONE ACETATE 1 DROP: 10 SUSPENSION/ DROPS OPHTHALMIC at 20:11

## 2020-01-01 RX ADMIN — INSULIN LISPRO 4 UNITS: 100 INJECTION, SOLUTION INTRAVENOUS; SUBCUTANEOUS at 09:04

## 2020-01-01 RX ADMIN — SUCRALFATE 1 G: 1 TABLET ORAL at 06:14

## 2020-01-01 RX ADMIN — Medication 1000 UNITS: at 08:22

## 2020-01-01 RX ADMIN — Medication 125 MG: at 08:01

## 2020-01-01 RX ADMIN — INSULIN LISPRO 1 UNITS: 100 INJECTION, SOLUTION INTRAVENOUS; SUBCUTANEOUS at 12:14

## 2020-01-01 RX ADMIN — ATORVASTATIN CALCIUM 40 MG: 40 TABLET, FILM COATED ORAL at 17:46

## 2020-01-01 RX ADMIN — MAGNESIUM GLUCONATE 500 MG ORAL TABLET 200 MG: 500 TABLET ORAL at 09:23

## 2020-01-01 RX ADMIN — NIFEDIPINE 60 MG: 30 TABLET, EXTENDED RELEASE ORAL at 08:02

## 2020-01-01 RX ADMIN — PANTOPRAZOLE SODIUM 40 MG: 40 TABLET, DELAYED RELEASE ORAL at 05:23

## 2020-01-01 RX ADMIN — INSULIN LISPRO 4 UNITS: 100 INJECTION, SOLUTION INTRAVENOUS; SUBCUTANEOUS at 17:09

## 2020-01-01 RX ADMIN — MAGNESIUM GLUCONATE 500 MG ORAL TABLET 400 MG: 500 TABLET ORAL at 08:29

## 2020-01-01 RX ADMIN — INSULIN LISPRO 4 UNITS: 100 INJECTION, SOLUTION INTRAVENOUS; SUBCUTANEOUS at 08:01

## 2020-01-01 RX ADMIN — NIFEDIPINE 60 MG: 30 TABLET, FILM COATED, EXTENDED RELEASE ORAL at 09:22

## 2020-01-01 RX ADMIN — INSULIN DETEMIR 10 UNITS: 100 INJECTION, SOLUTION SUBCUTANEOUS at 08:40

## 2020-01-01 RX ADMIN — VITAMIN D, TAB 1000IU (100/BT) 1000 UNITS: 25 TAB at 08:40

## 2020-01-01 RX ADMIN — Medication 1 CAPSULE: at 15:50

## 2020-01-01 RX ADMIN — SODIUM BICARBONATE 650 MG TABLET 1300 MG: at 12:01

## 2020-01-01 RX ADMIN — ATORVASTATIN CALCIUM 40 MG: 40 TABLET, FILM COATED ORAL at 19:07

## 2020-01-01 RX ADMIN — Medication 125 MG: at 00:34

## 2020-01-01 RX ADMIN — MAGNESIUM GLUCONATE 500 MG ORAL TABLET 400 MG: 500 TABLET ORAL at 08:44

## 2020-01-01 RX ADMIN — PREDNISOLONE ACETATE 1 DROP: 10 SUSPENSION/ DROPS OPHTHALMIC at 20:30

## 2020-01-01 RX ADMIN — FENTANYL CITRATE 25 MCG: 50 INJECTION, SOLUTION INTRAMUSCULAR; INTRAVENOUS at 15:21

## 2020-01-01 RX ADMIN — PREDNISOLONE ACETATE 1 DROP: 10 SUSPENSION/ DROPS OPHTHALMIC at 00:53

## 2020-01-01 RX ADMIN — NIFEDIPINE 60 MG: 30 TABLET, EXTENDED RELEASE ORAL at 09:49

## 2020-01-01 RX ADMIN — HEPARIN SODIUM 5000 UNITS: 5000 INJECTION INTRAVENOUS; SUBCUTANEOUS at 06:12

## 2020-01-01 RX ADMIN — INSULIN LISPRO 1 UNITS: 100 INJECTION, SOLUTION INTRAVENOUS; SUBCUTANEOUS at 22:15

## 2020-01-01 RX ADMIN — INSULIN DETEMIR 5 UNITS: 100 INJECTION, SOLUTION SUBCUTANEOUS at 21:25

## 2020-01-01 RX ADMIN — NYSTATIN: 100000 POWDER TOPICAL at 08:01

## 2020-01-01 RX ADMIN — INSULIN LISPRO 4 UNITS: 100 INJECTION, SOLUTION INTRAVENOUS; SUBCUTANEOUS at 11:35

## 2020-01-01 RX ADMIN — HEPARIN SODIUM 5000 UNITS: 5000 INJECTION INTRAVENOUS; SUBCUTANEOUS at 13:55

## 2020-01-01 RX ADMIN — METOPROLOL TARTRATE 12.5 MG: 25 TABLET ORAL at 22:07

## 2020-01-01 RX ADMIN — LANREOTIDE ACETATE 120 MG: 120 INJECTION SUBCUTANEOUS at 15:26

## 2020-01-01 RX ADMIN — ASPIRIN 81 MG 81 MG: 81 TABLET ORAL at 08:40

## 2020-01-01 RX ADMIN — GLUCAGON HYDROCHLORIDE 1 MG: KIT at 16:37

## 2020-01-01 RX ADMIN — METOPROLOL TARTRATE 12.5 MG: 25 TABLET ORAL at 08:02

## 2020-01-01 RX ADMIN — Medication 1 CAPSULE: at 17:54

## 2020-01-01 RX ADMIN — PROPOFOL 5 MG: 10 INJECTION, EMULSION INTRAVENOUS at 14:00

## 2020-01-01 RX ADMIN — HEPARIN SODIUM 5000 UNITS: 5000 INJECTION INTRAVENOUS; SUBCUTANEOUS at 06:14

## 2020-01-01 RX ADMIN — HEPARIN SODIUM 5000 UNITS: 5000 INJECTION INTRAVENOUS; SUBCUTANEOUS at 21:25

## 2020-01-01 RX ADMIN — HEPARIN SODIUM 5000 UNITS: 5000 INJECTION INTRAVENOUS; SUBCUTANEOUS at 22:14

## 2020-01-01 RX ADMIN — TAMSULOSIN HYDROCHLORIDE 0.4 MG: 0.4 CAPSULE ORAL at 17:54

## 2020-01-01 RX ADMIN — HEPARIN SODIUM 5000 UNITS: 5000 INJECTION INTRAVENOUS; SUBCUTANEOUS at 05:28

## 2020-01-01 RX ADMIN — INSULIN DETEMIR 10 UNITS: 100 INJECTION, SOLUTION SUBCUTANEOUS at 08:03

## 2020-01-01 RX ADMIN — METOPROLOL TARTRATE 12.5 MG: 25 TABLET ORAL at 22:14

## 2020-01-01 RX ADMIN — Medication 125 MG: at 18:49

## 2020-01-01 RX ADMIN — ALUMINUM HYDROXIDE, MAGNESIUM HYDROXIDE, AND SIMETHICONE 30 ML: 200; 200; 20 SUSPENSION ORAL at 21:06

## 2020-01-01 RX ADMIN — METOPROLOL TARTRATE 12.5 MG: 25 TABLET ORAL at 09:30

## 2020-01-01 RX ADMIN — PREDNISOLONE ACETATE 1 DROP: 10 SUSPENSION/ DROPS OPHTHALMIC at 11:58

## 2020-01-01 RX ADMIN — INSULIN LISPRO 2 UNITS: 100 INJECTION, SOLUTION INTRAVENOUS; SUBCUTANEOUS at 19:06

## 2020-01-01 RX ADMIN — ONDANSETRON 4 MG: 2 INJECTION INTRAMUSCULAR; INTRAVENOUS at 10:56

## 2020-01-01 RX ADMIN — INSULIN LISPRO 2 UNITS: 100 INJECTION, SOLUTION INTRAVENOUS; SUBCUTANEOUS at 12:12

## 2020-01-01 RX ADMIN — COLCHICINE 0.6 MG: 0.6 TABLET, FILM COATED ORAL at 08:02

## 2020-01-01 RX ADMIN — MECLIZINE 25 MG: 12.5 TABLET ORAL at 17:28

## 2020-01-01 RX ADMIN — DOCUSATE SODIUM 100 MG: 100 CAPSULE, LIQUID FILLED ORAL at 17:25

## 2020-01-01 RX ADMIN — INSULIN DETEMIR 10 UNITS: 100 INJECTION, SOLUTION SUBCUTANEOUS at 22:02

## 2020-01-01 RX ADMIN — VITAMIN D, TAB 1000IU (100/BT) 1000 UNITS: 25 TAB at 09:30

## 2020-01-01 RX ADMIN — METOPROLOL TARTRATE 12.5 MG: 25 TABLET ORAL at 08:39

## 2020-01-01 RX ADMIN — ASPIRIN 81 MG: 81 TABLET, COATED ORAL at 18:01

## 2020-01-01 RX ADMIN — SODIUM BICARBONATE 650 MG TABLET 650 MG: at 17:08

## 2020-01-01 RX ADMIN — EPHEDRINE SULFATE 5 MG: 50 INJECTION INTRAVENOUS at 15:16

## 2020-01-01 RX ADMIN — SODIUM BICARBONATE 650 MG TABLET 650 MG: at 08:02

## 2020-01-01 RX ADMIN — SODIUM BICARBONATE 650 MG TABLET 1300 MG: at 11:46

## 2020-01-01 RX ADMIN — INSULIN DETEMIR 10 UNITS: 100 INJECTION, SOLUTION SUBCUTANEOUS at 08:33

## 2020-01-01 RX ADMIN — HEPARIN SODIUM 5000 UNITS: 5000 INJECTION INTRAVENOUS; SUBCUTANEOUS at 05:02

## 2020-01-01 RX ADMIN — NIFEDIPINE 60 MG: 30 TABLET, EXTENDED RELEASE ORAL at 08:43

## 2020-01-01 RX ADMIN — HEPARIN SODIUM 5000 UNITS: 5000 INJECTION INTRAVENOUS; SUBCUTANEOUS at 15:45

## 2020-01-01 RX ADMIN — PHENYLEPHRINE HYDROCHLORIDE 1 DROP: 2.5 SOLUTION/ DROPS OPHTHALMIC at 11:05

## 2020-01-01 RX ADMIN — INSULIN LISPRO 1 UNITS: 100 INJECTION, SOLUTION INTRAVENOUS; SUBCUTANEOUS at 11:47

## 2020-01-01 RX ADMIN — INSULIN LISPRO 4 UNITS: 100 INJECTION, SOLUTION INTRAVENOUS; SUBCUTANEOUS at 17:47

## 2020-01-01 RX ADMIN — FENTANYL CITRATE 25 MCG: 50 INJECTION INTRAMUSCULAR; INTRAVENOUS at 19:52

## 2020-01-01 RX ADMIN — MAGNESIUM GLUCONATE 500 MG ORAL TABLET 400 MG: 500 TABLET ORAL at 17:44

## 2020-01-01 RX ADMIN — ASPIRIN 81 MG: 81 TABLET, COATED ORAL at 09:02

## 2020-01-01 RX ADMIN — HEPARIN SODIUM 5000 UNITS: 5000 INJECTION INTRAVENOUS; SUBCUTANEOUS at 05:07

## 2020-01-01 RX ADMIN — ATORVASTATIN CALCIUM 40 MG: 40 TABLET, FILM COATED ORAL at 17:44

## 2020-01-01 RX ADMIN — DOCUSATE SODIUM 100 MG: 100 CAPSULE, LIQUID FILLED ORAL at 17:54

## 2020-01-01 RX ADMIN — PROPOFOL 120 MG: 10 INJECTION, EMULSION INTRAVENOUS at 15:06

## 2020-01-01 RX ADMIN — SODIUM CHLORIDE 75 ML/HR: 0.9 INJECTION, SOLUTION INTRAVENOUS at 22:21

## 2020-01-01 RX ADMIN — MAGNESIUM GLUCONATE 500 MG ORAL TABLET 400 MG: 500 TABLET ORAL at 17:30

## 2020-01-01 RX ADMIN — INSULIN LISPRO 1 UNITS: 100 INJECTION, SOLUTION INTRAVENOUS; SUBCUTANEOUS at 18:00

## 2020-01-01 RX ADMIN — INSULIN LISPRO 4 UNITS: 100 INJECTION, SOLUTION INTRAVENOUS; SUBCUTANEOUS at 18:00

## 2020-01-01 RX ADMIN — FLURBIPROFEN SODIUM 1 DROP: 0.3 SOLUTION/ DROPS OPHTHALMIC at 10:45

## 2020-01-01 RX ADMIN — AMLODIPINE BESYLATE 10 MG: 10 TABLET ORAL at 08:22

## 2020-01-01 RX ADMIN — INSULIN DETEMIR 10 UNITS: 100 INJECTION, SOLUTION SUBCUTANEOUS at 23:00

## 2020-01-01 RX ADMIN — FENTANYL CITRATE 25 MCG: 50 INJECTION INTRAMUSCULAR; INTRAVENOUS at 13:43

## 2020-01-01 RX ADMIN — FUROSEMIDE 40 MG: 40 TABLET ORAL at 08:40

## 2020-01-01 RX ADMIN — LIDOCAINE HYDROCHLORIDE 100 MG: 20 INJECTION, SOLUTION EPIDURAL; INFILTRATION; INTRACAUDAL; PERINEURAL at 15:06

## 2020-01-01 RX ADMIN — SODIUM CHLORIDE 50 ML/HR: 0.9 INJECTION, SOLUTION INTRAVENOUS at 21:25

## 2020-01-01 RX ADMIN — INSULIN LISPRO 4 UNITS: 100 INJECTION, SOLUTION INTRAVENOUS; SUBCUTANEOUS at 12:22

## 2020-01-01 RX ADMIN — HEPARIN SODIUM 5000 UNITS: 5000 INJECTION INTRAVENOUS; SUBCUTANEOUS at 06:27

## 2020-01-01 RX ADMIN — DIAZEPAM 2.5 MG: 10 INJECTION, SOLUTION INTRAMUSCULAR; INTRAVENOUS at 18:40

## 2020-01-01 RX ADMIN — ONDANSETRON 4 MG: 2 INJECTION INTRAMUSCULAR; INTRAVENOUS at 18:43

## 2020-01-01 RX ADMIN — TAMSULOSIN HYDROCHLORIDE 0.4 MG: 0.4 CAPSULE ORAL at 15:50

## 2020-01-01 RX ADMIN — HEPARIN SODIUM 5000 UNITS: 5000 INJECTION INTRAVENOUS; SUBCUTANEOUS at 14:01

## 2020-01-01 RX ADMIN — AMLODIPINE BESYLATE 10 MG: 10 TABLET ORAL at 08:29

## 2020-01-01 RX ADMIN — PANTOPRAZOLE SODIUM 40 MG: 40 TABLET, DELAYED RELEASE ORAL at 05:40

## 2020-01-01 RX ADMIN — SODIUM BICARBONATE 650 MG TABLET 1300 MG: at 12:00

## 2020-01-01 RX ADMIN — HEPARIN SODIUM 5000 UNITS: 5000 INJECTION INTRAVENOUS; SUBCUTANEOUS at 17:28

## 2020-01-01 RX ADMIN — OFLOXACIN 1 DROP: 3 SOLUTION/ DROPS OPHTHALMIC at 20:30

## 2020-01-01 RX ADMIN — METOPROLOL TARTRATE 12.5 MG: 25 TABLET ORAL at 15:45

## 2020-01-01 RX ADMIN — CEFTRIAXONE SODIUM 1000 MG: 10 INJECTION, POWDER, FOR SOLUTION INTRAVENOUS at 23:12

## 2020-01-01 RX ADMIN — MAGNESIUM GLUCONATE 500 MG ORAL TABLET 400 MG: 500 TABLET ORAL at 17:08

## 2020-01-01 RX ADMIN — HEPARIN SODIUM 5000 UNITS: 5000 INJECTION INTRAVENOUS; SUBCUTANEOUS at 13:47

## 2020-01-01 RX ADMIN — ATORVASTATIN CALCIUM 40 MG: 40 TABLET, FILM COATED ORAL at 17:08

## 2020-01-01 RX ADMIN — MAGNESIUM GLUCONATE 500 MG ORAL TABLET 400 MG: 500 TABLET ORAL at 08:39

## 2020-01-01 RX ADMIN — INSULIN DETEMIR 10 UNITS: 100 INJECTION, SOLUTION SUBCUTANEOUS at 22:08

## 2020-01-01 RX ADMIN — MAGNESIUM GLUCONATE 500 MG ORAL TABLET 200 MG: 500 TABLET ORAL at 18:50

## 2020-01-01 RX ADMIN — FENTANYL CITRATE 25 MCG: 50 INJECTION INTRAMUSCULAR; INTRAVENOUS at 13:31

## 2020-01-01 RX ADMIN — NIFEDIPINE 60 MG: 30 TABLET, EXTENDED RELEASE ORAL at 08:03

## 2020-01-01 RX ADMIN — HEPARIN SODIUM 5000 UNITS: 5000 INJECTION INTRAVENOUS; SUBCUTANEOUS at 06:15

## 2020-01-01 RX ADMIN — NIFEDIPINE 60 MG: 30 TABLET, EXTENDED RELEASE ORAL at 08:40

## 2020-01-01 RX ADMIN — ONDANSETRON 4 MG: 2 INJECTION INTRAMUSCULAR; INTRAVENOUS at 16:34

## 2020-01-01 RX ADMIN — ATORVASTATIN CALCIUM 40 MG: 40 TABLET, FILM COATED ORAL at 18:57

## 2020-01-01 RX ADMIN — MAGNESIUM GLUCONATE 500 MG ORAL TABLET 400 MG: 500 TABLET ORAL at 19:13

## 2020-01-01 RX ADMIN — PANTOPRAZOLE SODIUM 40 MG: 40 TABLET, DELAYED RELEASE ORAL at 05:54

## 2020-01-01 RX ADMIN — PROPOFOL 10 MG: 10 INJECTION, EMULSION INTRAVENOUS at 14:17

## 2020-01-01 RX ADMIN — VITAMIN D, TAB 1000IU (100/BT) 1000 UNITS: 25 TAB at 08:02

## 2020-01-01 RX ADMIN — OFLOXACIN 1 DROP: 3 SOLUTION/ DROPS OPHTHALMIC at 00:54

## 2020-01-01 RX ADMIN — PREDNISOLONE ACETATE 1 DROP: 10 SUSPENSION/ DROPS OPHTHALMIC at 00:29

## 2020-01-01 RX ADMIN — ASPIRIN 81 MG 81 MG: 81 TABLET ORAL at 08:04

## 2020-01-01 RX ADMIN — HEPARIN SODIUM 5000 UNITS: 5000 INJECTION INTRAVENOUS; SUBCUTANEOUS at 21:32

## 2020-01-01 RX ADMIN — PROPOFOL 10 MG: 10 INJECTION, EMULSION INTRAVENOUS at 14:01

## 2020-01-01 RX ADMIN — HEPARIN SODIUM AND DEXTROSE 12 UNITS/KG/HR: 10000; 5 INJECTION INTRAVENOUS at 02:04

## 2020-01-01 RX ADMIN — SODIUM BICARBONATE 650 MG TABLET 650 MG: at 12:20

## 2020-01-01 RX ADMIN — SODIUM BICARBONATE 325 MG: 650 TABLET ORAL at 18:50

## 2020-01-01 RX ADMIN — Medication 1000 UNITS: at 09:22

## 2020-01-01 RX ADMIN — PROPOFOL 10 MG: 10 INJECTION, EMULSION INTRAVENOUS at 14:25

## 2020-01-01 RX ADMIN — PANTOPRAZOLE SODIUM 40 MG: 40 TABLET, DELAYED RELEASE ORAL at 06:39

## 2020-01-01 RX ADMIN — INSULIN LISPRO 2 UNITS: 100 INJECTION, SOLUTION INTRAVENOUS; SUBCUTANEOUS at 16:07

## 2020-01-01 RX ADMIN — LANREOTIDE ACETATE 120 MG: 120 INJECTION SUBCUTANEOUS at 12:04

## 2020-01-01 RX ADMIN — FUROSEMIDE 40 MG: 40 TABLET ORAL at 08:02

## 2020-01-01 RX ADMIN — SODIUM BICARBONATE 650 MG TABLET 1300 MG: at 17:54

## 2020-01-01 RX ADMIN — SODIUM BICARBONATE 650 MG TABLET 650 MG: at 08:41

## 2020-01-01 RX ADMIN — INSULIN LISPRO 4 UNITS: 100 INJECTION, SOLUTION INTRAVENOUS; SUBCUTANEOUS at 12:00

## 2020-01-01 RX ADMIN — MAGNESIUM GLUCONATE 500 MG ORAL TABLET 400 MG: 500 TABLET ORAL at 18:15

## 2020-01-01 RX ADMIN — CEFTRIAXONE SODIUM 1000 MG: 10 INJECTION, POWDER, FOR SOLUTION INTRAVENOUS at 20:02

## 2020-01-01 RX ADMIN — SODIUM CHLORIDE 500 ML: 0.9 INJECTION, SOLUTION INTRAVENOUS at 19:59

## 2020-01-01 RX ADMIN — NIFEDIPINE 60 MG: 30 TABLET, FILM COATED, EXTENDED RELEASE ORAL at 08:22

## 2020-01-01 RX ADMIN — HEPARIN SODIUM 5000 UNITS: 5000 INJECTION INTRAVENOUS; SUBCUTANEOUS at 16:19

## 2020-01-01 RX ADMIN — SODIUM BICARBONATE 650 MG TABLET 650 MG: at 19:07

## 2020-01-01 RX ADMIN — INSULIN DETEMIR 5 UNITS: 100 INJECTION, SOLUTION SUBCUTANEOUS at 08:01

## 2020-01-01 RX ADMIN — MAGNESIUM GLUCONATE 500 MG ORAL TABLET 400 MG: 500 TABLET ORAL at 08:04

## 2020-01-01 RX ADMIN — Medication 1 CAPSULE: at 17:08

## 2020-01-01 RX ADMIN — SODIUM BICARBONATE 325 MG: 650 TABLET ORAL at 17:28

## 2020-01-01 RX ADMIN — INSULIN DETEMIR 5 UNITS: 100 INJECTION, SOLUTION SUBCUTANEOUS at 09:03

## 2020-01-01 RX ADMIN — SODIUM BICARBONATE 650 MG TABLET 650 MG: at 12:13

## 2020-01-01 RX ADMIN — FENTANYL CITRATE 25 MCG: 50 INJECTION INTRAMUSCULAR; INTRAVENOUS at 13:19

## 2020-01-01 RX ADMIN — OFLOXACIN 1 DROP: 3 SOLUTION/ DROPS OPHTHALMIC at 20:11

## 2020-01-01 RX ADMIN — LIDOCAINE 1 PATCH: 50 PATCH TOPICAL at 13:35

## 2020-01-01 RX ADMIN — INSULIN LISPRO 4 UNITS: 100 INJECTION, SOLUTION INTRAVENOUS; SUBCUTANEOUS at 18:29

## 2020-01-01 RX ADMIN — COLCHICINE 0.3 MG: 0.6 TABLET, FILM COATED ORAL at 09:23

## 2020-01-01 RX ADMIN — VITAM B12 100 MCG: 100 TAB at 08:22

## 2020-01-01 RX ADMIN — HEPARIN SODIUM 5000 UNITS: 5000 INJECTION INTRAVENOUS; SUBCUTANEOUS at 21:52

## 2020-01-01 RX ADMIN — ASPIRIN 81 MG: 81 TABLET, COATED ORAL at 08:40

## 2020-01-01 RX ADMIN — PROPOFOL 5 MG: 10 INJECTION, EMULSION INTRAVENOUS at 13:56

## 2020-01-01 RX ADMIN — SODIUM BICARBONATE 650 MG TABLET 650 MG: at 08:44

## 2020-01-01 RX ADMIN — VITAM B12 100 MCG: 100 TAB at 09:23

## 2020-01-01 RX ADMIN — DOCUSATE SODIUM 100 MG: 100 CAPSULE, LIQUID FILLED ORAL at 08:04

## 2020-01-01 RX ADMIN — OFLOXACIN 1 DROP: 3 SOLUTION/ DROPS OPHTHALMIC at 08:22

## 2020-01-01 RX ADMIN — AMLODIPINE BESYLATE 10 MG: 10 TABLET ORAL at 09:22

## 2020-01-01 RX ADMIN — PIPERACILLIN SODIUM,TAZOBACTAM SODIUM 2.25 G: 2; .25 INJECTION, POWDER, FOR SOLUTION INTRAVENOUS at 15:00

## 2020-01-01 RX ADMIN — TAMSULOSIN HYDROCHLORIDE 0.4 MG: 0.4 CAPSULE ORAL at 17:08

## 2020-01-01 RX ADMIN — MAGNESIUM GLUCONATE 500 MG ORAL TABLET 400 MG: 500 TABLET ORAL at 10:06

## 2020-01-01 RX ADMIN — Medication 1 CAPSULE: at 19:13

## 2020-01-02 ENCOUNTER — TELEPHONE (OUTPATIENT)
Dept: SURGERY | Facility: CLINIC | Age: 80
End: 2020-01-02

## 2020-01-02 ENCOUNTER — TELEPHONE (OUTPATIENT)
Dept: INTERVENTIONAL RADIOLOGY/VASCULAR | Facility: HOSPITAL | Age: 80
End: 2020-01-02

## 2020-01-02 NOTE — TELEPHONE ENCOUNTER
Rec'd a call from Nya from [facility] for patient  Patient's drain site is red, tender and there is quite a bit of drainage from the site itself, not the BONILLA tube  Information given for IR ([phone])

## 2020-01-02 NOTE — TELEPHONE ENCOUNTER
Called and spoke to Lona Disla is aware of course of action for treatment of the Perc Josette drain site  States best pharmacy is the [facility] in [place]  She has no questions at this time

## 2020-01-02 NOTE — TELEPHONE ENCOUNTER
Patient's home health nurse, Liliana Miranda called  She states patient has redness, drainage, and pain at the site of his Per Josette tube  Dr Gentry Britt reviewed the case and recommends site by open to air and antibiotic cream applied  Dr Gentry Britt also states he will call in a prescription for oral ABX  Liliana Miranda aware of course of action and to follow up with IR as needed

## 2020-01-03 ENCOUNTER — TELEPHONE (OUTPATIENT)
Dept: HEMATOLOGY ONCOLOGY | Facility: CLINIC | Age: 80
End: 2020-01-03

## 2020-01-03 ENCOUNTER — APPOINTMENT (OUTPATIENT)
Dept: LAB | Facility: CLINIC | Age: 80
End: 2020-01-03
Payer: COMMERCIAL

## 2020-01-03 ENCOUNTER — TRANSCRIBE ORDERS (OUTPATIENT)
Dept: LAB | Facility: CLINIC | Age: 80
End: 2020-01-03

## 2020-01-03 DIAGNOSIS — C7B.8 NEUROENDOCRINE CARCINOMA METASTATIC TO LIVER (HCC): ICD-10-CM

## 2020-01-03 DIAGNOSIS — C7A.8 NEUROENDOCRINE CARCINOMA METASTATIC TO LIVER (HCC): ICD-10-CM

## 2020-01-03 LAB
ALBUMIN SERPL BCP-MCNC: 3 G/DL (ref 3.5–5)
ALP SERPL-CCNC: 163 U/L (ref 46–116)
ALT SERPL W P-5'-P-CCNC: 32 U/L (ref 12–78)
ANION GAP SERPL CALCULATED.3IONS-SCNC: 6 MMOL/L (ref 4–13)
AST SERPL W P-5'-P-CCNC: 19 U/L (ref 5–45)
BASOPHILS # BLD AUTO: 0.1 THOUSANDS/ΜL (ref 0–0.1)
BASOPHILS NFR BLD AUTO: 1 % (ref 0–1)
BILIRUB SERPL-MCNC: 0.48 MG/DL (ref 0.2–1)
BUN SERPL-MCNC: 29 MG/DL (ref 5–25)
CALCIUM SERPL-MCNC: 9.1 MG/DL (ref 8.3–10.1)
CHLORIDE SERPL-SCNC: 112 MMOL/L (ref 100–108)
CO2 SERPL-SCNC: 25 MMOL/L (ref 21–32)
CREAT SERPL-MCNC: 2.54 MG/DL (ref 0.6–1.3)
EOSINOPHIL # BLD AUTO: 0.12 THOUSAND/ΜL (ref 0–0.61)
EOSINOPHIL NFR BLD AUTO: 1 % (ref 0–6)
ERYTHROCYTE [DISTWIDTH] IN BLOOD BY AUTOMATED COUNT: 14.8 % (ref 11.6–15.1)
GFR SERPL CREATININE-BSD FRML MDRD: 23 ML/MIN/1.73SQ M
GLUCOSE SERPL-MCNC: 149 MG/DL (ref 65–140)
HCT VFR BLD AUTO: 36.1 % (ref 36.5–49.3)
HGB BLD-MCNC: 11.4 G/DL (ref 12–17)
IMM GRANULOCYTES # BLD AUTO: 0.08 THOUSAND/UL (ref 0–0.2)
IMM GRANULOCYTES NFR BLD AUTO: 1 % (ref 0–2)
LYMPHOCYTES # BLD AUTO: 1.64 THOUSANDS/ΜL (ref 0.6–4.47)
LYMPHOCYTES NFR BLD AUTO: 17 % (ref 14–44)
MAGNESIUM SERPL-MCNC: 1.9 MG/DL (ref 1.6–2.6)
MCH RBC QN AUTO: 28.9 PG (ref 26.8–34.3)
MCHC RBC AUTO-ENTMCNC: 31.6 G/DL (ref 31.4–37.4)
MCV RBC AUTO: 91 FL (ref 82–98)
MONOCYTES # BLD AUTO: 0.7 THOUSAND/ΜL (ref 0.17–1.22)
MONOCYTES NFR BLD AUTO: 7 % (ref 4–12)
NEUTROPHILS # BLD AUTO: 7.06 THOUSANDS/ΜL (ref 1.85–7.62)
NEUTS SEG NFR BLD AUTO: 73 % (ref 43–75)
NRBC BLD AUTO-RTO: 0 /100 WBCS
PLATELET # BLD AUTO: 201 THOUSANDS/UL (ref 149–390)
PMV BLD AUTO: 11 FL (ref 8.9–12.7)
POTASSIUM SERPL-SCNC: 4.4 MMOL/L (ref 3.5–5.3)
PROT SERPL-MCNC: 7.1 G/DL (ref 6.4–8.2)
RBC # BLD AUTO: 3.95 MILLION/UL (ref 3.88–5.62)
SODIUM SERPL-SCNC: 143 MMOL/L (ref 136–145)
WBC # BLD AUTO: 9.7 THOUSAND/UL (ref 4.31–10.16)

## 2020-01-03 PROCEDURE — 85025 COMPLETE CBC W/AUTO DIFF WBC: CPT

## 2020-01-03 PROCEDURE — 36415 COLL VENOUS BLD VENIPUNCTURE: CPT

## 2020-01-03 PROCEDURE — 86316 IMMUNOASSAY TUMOR OTHER: CPT

## 2020-01-03 PROCEDURE — 80053 COMPREHEN METABOLIC PANEL: CPT

## 2020-01-03 PROCEDURE — 83735 ASSAY OF MAGNESIUM: CPT

## 2020-01-03 NOTE — TELEPHONE ENCOUNTER
The patient was called to complete the blood work prior to the appointment on 1/7/2020  Ronit Diego stated the patient will be going today for blood work

## 2020-01-07 ENCOUNTER — OFFICE VISIT (OUTPATIENT)
Dept: HEMATOLOGY ONCOLOGY | Facility: CLINIC | Age: 80
End: 2020-01-07
Payer: COMMERCIAL

## 2020-01-07 VITALS
RESPIRATION RATE: 18 BRPM | SYSTOLIC BLOOD PRESSURE: 112 MMHG | DIASTOLIC BLOOD PRESSURE: 58 MMHG | HEIGHT: 68 IN | OXYGEN SATURATION: 98 % | HEART RATE: 83 BPM | WEIGHT: 155 LBS | TEMPERATURE: 97.5 F | BODY MASS INDEX: 23.49 KG/M2

## 2020-01-07 DIAGNOSIS — E34.0 CARCINOID SYNDROME (HCC): ICD-10-CM

## 2020-01-07 DIAGNOSIS — R63.4 WEIGHT LOSS: ICD-10-CM

## 2020-01-07 DIAGNOSIS — C7A.8 NEUROENDOCRINE CARCINOMA METASTATIC TO LIVER (HCC): Primary | ICD-10-CM

## 2020-01-07 DIAGNOSIS — C7B.8 NEUROENDOCRINE CARCINOMA METASTATIC TO LIVER (HCC): Primary | ICD-10-CM

## 2020-01-07 PROCEDURE — 99214 OFFICE O/P EST MOD 30 MIN: CPT | Performed by: NURSE PRACTITIONER

## 2020-01-07 RX ORDER — AMOXICILLIN AND CLAVULANATE POTASSIUM 500; 125 MG/1; MG/1
TABLET, FILM COATED ORAL
COMMUNITY
Start: 2020-01-02 | End: 2020-01-12 | Stop reason: HOSPADM

## 2020-01-07 NOTE — PROGRESS NOTES
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 [facility])  The patient will be continued on his Lanreotide injections 120 mg every 4 weeks for his neuroendocrine tumor which seems to be controlling his symptoms  He is due for his next injection next week on 01/14/2020  His most recent chromogranin A level is still pending at this time we will review and address results once they become available  We will continue to monitor the patient closely he will be back for follow-up in about 4 weeks with repeat laboratory studies prior     - CBC and differential; Future  - Comprehensive metabolic panel; Future  - Magnesium; Future  - Chromogranin A; Future    2  Carcinoid syndrome ([facility] [address] )  As above  We did again discuss today triggers for carcinoid syndrome/symptoms and how to decrease/avoid them  3  Weight loss  Patient has had 5 lb weight loss since his last office visit proximal 1 month ago  He denies any change in his appetite or nutritional intake  We will check a TSH prior to start follow-up continue to monitor     - TSH, 3rd generation with Free T4 reflex; Future    HPI:  Patient presents today for a follow-up appointment accompanied by his wife  He was admitted to [facility] 12/10 through 12/12 after he was found to have significant right flank pain along with dyspnea  His percutaneous cholecystectomy tube was changed by the IR department on 12/10 and he was treated with a course of antibiotics according to the wife  The medical team did discuss hospice with the patient and his wife while he was an inpatient and he met with the hospice liaison but they are not interested in hospice care at this time  The patient states that he has visiting nurse services including a nurse's aid and therapy coming to his house on a regular basis    He continues to have a chronic Haile catheter which is being changed monthly  His percutaneous cholecystectomy tube is also being changed on a monthly basis  The patient denies any facial flushing, chest pain, worsening dyspnea or rash  He continues to have occasional diarrhea a few times per week no more than 3 episodes per day which is significantly improved with the lanreotide injection  He has lost about 5 lb since his last follow-up visit but states that he has a good appetite and is eating and drinking fluids well  His most recent laboratory studies his most recent laboratory studies from 01/03/2020 showed normal white cells and platelets, mild stable normocytic anemia H&H 11 4/36 1, MCV 91  Glucose 149, alk-phos 163, continues to have stable chronic renal dysfunction BUN 29, creatinine 2 54, GFR 23, remaining metabolic panel is normal   His chromogranin a was done but is still pending at this point  Oncology History    Patient has multiple comorbid conditions including history of coronary artery disease, diabetes mellitus, hypertension, hyperlipidemia, and more recently metastatic neuroendocrine tumor       The patient was seen in consultation when he was in the hospital on the 5th February 2019 at that time he had significant weakness status post vasovagal syncope   He was evaluated with a CT scan of the abdomen and pelvis on the 23rd January 2019 which showed partially calcified mesenteric mass with multiple liver lesions compatible most likely with carcinoid malignancy   He also was found to have bilateral hydronephrosis and severe bladder wall thickening with prostatomegaly      The patient then had a core biopsy from 1 of the liver lesions on the 24th of January which showed well-differentiated neuroendocrine tumor grade 1-2 with Ki 67 of 3-5%         His chromogranin level  February 2019 was 273   His 24 hour urine for HIAA was 48 which is above normal   The patient was started on Lanreotide in March 2019     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 ([facility] [address] )    1/24/2019 Initial Diagnosis     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 ([facility] [address] )      1/24/2019 Biopsy     A  Liver mass, core biopsy:  - Well differentiated neuroendocrine tumor, G2         3/2019 -  Chemotherapy     Lanreotide 120 mg every 4 weeks injections          Interval history:    ROS: Review of Systems   Constitutional: Positive for fatigue ( fatigues easily with activity)  Negative for activity change, appetite change, chills, fever and unexpected weight change  HENT: Negative for congestion, mouth sores, nosebleeds, sore throat and trouble swallowing  Eyes: Negative  Respiratory: Positive for shortness of breath ( chronic exertional-no change from baseline)  Negative for cough and chest tightness  Cardiovascular: Negative for chest pain, palpitations and leg swelling  Gastrointestinal: Positive for diarrhea  Negative for abdominal distention, abdominal pain, blood in stool, constipation, nausea and vomiting  Percutaneous cholecystectomy tube right side   Genitourinary: Positive for difficulty urinating  Negative for hematuria  Chronic Haile catheter   Musculoskeletal: Positive for arthralgias, gait problem ( ambulates with a walker) and myalgias  Negative for back pain and joint swelling  Skin: Negative for color change, pallor and rash  Neurological: Positive for numbness (And tingling to the hands moderate amount)  Negative for dizziness, weakness, light-headedness and headaches  Hematological: Negative for adenopathy  Does not bruise/bleed easily  Psychiatric/Behavioral: Negative for dysphoric mood and sleep disturbance  The patient is not nervous/anxious          Past Medical History:   Diagnosis Date    Acute pancreatitis without infection or necrosis 9/26/2019    Anemia of chronic renal failure     BPH (benign prostatic hyperplasia)     Cancer (HCC)     liver cancer    Cardiac disease     Chronic kidney disease, stage 3 (HCC)     Coronary artery disease     Diabetes mellitus ([place] [address] )     DJD (degenerative joint disease)     Essential hypertension     Gait disturbance     Generalized weakness     GERD (gastroesophageal reflux disease)     Gout     History of transfusion     Hydronephrosis     Hyperlipidemia     Hypertension     Liver cancer ([place] [address] )     Pressure injury of skin     Proteinuria     Renal disorder     Retention of urine     Thrombophlebitis migrans     Type 2 diabetes mellitus ([place] [address] )     Type 2 diabetes 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, with long-term current use of insulin ([place] [address] ) 1/23/2019       Past Surgical History:   Procedure Laterality Date    CORONARY ANGIOPLASTY WITH STENT PLACEMENT      IR IMAGE GUIDED BIOPSY/ASPIRATION LIVER  1/24/2019    IR TUBE PLACEMENT ROQUE  9/6/2019       Social History     Socioeconomic History    Marital status:       Spouse name: Not on file    Number of children: Not on file    Years of education: Not on file    Highest education level: Not on file   Occupational History    Not on file   Social Needs    Financial resource strain: Not on file    Food insecurity:     Worry: Not on file     Inability: Not on file    Transportation needs:     Medical: Not on file     Non-medical: Not on file   Tobacco Use    Smoking status: Never Smoker    Smokeless tobacco: Never Used   Substance and Sexual Activity    Alcohol use: Never     Frequency: Never    Drug use: Never    Sexual activity: Never   Lifestyle    Physical activity:     Days per week: Not on file     Minutes per session: Not on file    Stress: Not on file   Relationships    Social connections:     Talks on phone: Not on file     Gets together: Not on file     Attends Ca[employer] service: Not on file     Active member of club or organization: Not on file     Attends meetings of clubs or organizations: Not on file     Relationship status: Not on file    Intimate partner violence:     Fear of current or ex partner: Not on file     Emotionally abused: Not on file     Physically abused: Not on file     Forced sexual activity: Not on file   Other Topics Concern    Not on file   Social History Narrative    Not on file       Family History   Problem Relation Age of Onset    Cancer Mother     Hypertension Father     Cancer Brother     Cancer Maternal Grandmother     Cancer Paternal Aunt        No Known Allergies      Current Outpatient Medications:     acetaminophen (TYLENOL) 325 mg tablet, Take by mouth as needed, Disp: , Rfl:     amLODIPine (NORVASC) 10 mg tablet, Take 10 mg by mouth daily, Disp: , Rfl:     amoxicillin-clavulanate (AUGMENTIN) 500-125 mg per tablet, , Disp: , Rfl:     aspirin 81 MG tablet, Take 81 mg by mouth daily, Disp: , Rfl:     b complex-vitamin C-folic acid (NEPHROCAPS) 1 mg capsule, Take 1 capsule by mouth daily with dinner, Disp: 60 capsule, Rfl: 0    cholecalciferol (VITAMIN D3) 1,000 units tablet, Take 1,000 Units by mouth daily, Disp: , Rfl:     colchicine (COLCRYS) 0 6 mg tablet, Take 0 6 mg by mouth daily, Disp: , Rfl:     insulin detemir (LEVEMIR) 100 units/mL subcutaneous injection, Inject 10 Units under the skin daily at bedtime, Disp: , Rfl:     insulin lispro (HumaLOG) 100 units/mL injection, Inject under the skin 3 (three) times a day before meals, Disp: , Rfl:     Insulin Syringe-Needle U-100 30G X 1/2" 1 ML MISC, by Does not apply route 3 (three) times a day, Disp: 100 each, Rfl: 5    methenamine hippurate (HIPREX) 1 g tablet, Take 1 g by mouth 3 (three) times a day, Disp: , Rfl:     oxyCODONE-acetaminophen (PERCOCET) 5-325 mg per tablet, Take 1 tablet by mouth every 4 (four) hours as needed for moderate pain, Disp: , Rfl:     pantoprazole (PROTONIX) 40 mg tablet, Take 1 tablet (40 mg total) by mouth daily in the early morning, Disp: , Rfl: 0    simethicone (MYLICON) 80 mg chewable tablet, Chew 1 tablet (80 mg total) every 6 (six) hours as needed for flatulence, Disp: 90 tablet, Rfl: 5    sodium bicarbonate 650 mg tablet, Take 1 tablet (650 mg total) by mouth 2 (two) times daily after meals, Disp: , Rfl: 0    tamsulosin (FLOMAX) 0 4 mg, Take 1 capsule (0 4 mg total) by mouth daily with dinner, Disp: , Rfl: 0      Physical Exam:  /58 (BP Location: Left arm, Patient Position: Sitting, Cuff Size: Adult)   Pulse 83   Temp 97 5 °F (36 4 °C) (Tympanic)   Resp 18   Ht 5' 8" (1 727 m)   Wt 70 3 kg (155 lb)   SpO2 98%   BMI 23 57 kg/m²     Physical Exam   Constitutional: He is oriented to person, place, and time  He appears well-developed and well-nourished  No distress  HENT:   Head: Normocephalic and atraumatic  Mouth/Throat: Oropharynx is clear and moist  No oropharyngeal exudate  Eyes: Pupils are equal, round, and reactive to light  Conjunctivae are normal  No scleral icterus  Neck: Normal range of motion  Neck supple  No thyromegaly present  Cardiovascular: Normal rate, regular rhythm, normal heart sounds and intact distal pulses  No murmur heard  Pulmonary/Chest: Effort normal and breath sounds normal  No respiratory distress  Abdominal: Soft  Bowel sounds are normal  He exhibits no distension  There is hepatomegaly  There is no splenomegaly  There is no tenderness  Percutaneous cholecystectomy tube right side draining brownish red fluid- insertion site his pink without any drainage or odor   Genitourinary:   Genitourinary Comments: Haile catheter is draining yellow urine   Musculoskeletal: Normal range of motion  He exhibits no edema  Lymphadenopathy:     He has no cervical adenopathy  He has no axillary adenopathy  Neurological: He is alert and oriented to person, place, and time  Skin: Skin is warm and dry  No rash noted  He is not diaphoretic  No erythema  No pallor  Psychiatric: His behavior is normal  Judgment and thought content normal  His affect is blunt     Vitals reviewed  Labs:  Lab Results   Component Value Date    WBC 9 70 01/03/2020    HGB 11 4 (L) 01/03/2020    HCT 36 1 (L) 01/03/2020    MCV 91 01/03/2020     01/03/2020     Lab Results   Component Value Date    K 4 4 01/03/2020     (H) 01/03/2020    CO2 25 01/03/2020    BUN 29 (H) 01/03/2020    CREATININE 2 54 (H) 01/03/2020    GLUF 87 12/07/2019    CALCIUM 9 1 01/03/2020    AST 19 01/03/2020    ALT 32 01/03/2020    ALKPHOS 163 (H) 01/03/2020    EGFR 23 01/03/2020         Patient voiced understanding and agreement in the above discussion  Aware to contact our office with questions/symptoms in the interim  This note has been generated by voice recognition software system  Therefore, there may be spelling, grammar, and or syntax errors  Please contact if questions arise

## 2020-01-08 LAB — CGA SERPL-SCNC: 410 NMOL/L (ref 0–5)

## 2020-01-10 ENCOUNTER — APPOINTMENT (EMERGENCY)
Dept: CT IMAGING | Facility: HOSPITAL | Age: 80
DRG: 948 | End: 2020-01-10
Payer: COMMERCIAL

## 2020-01-10 ENCOUNTER — TELEPHONE (OUTPATIENT)
Dept: NEPHROLOGY | Facility: CLINIC | Age: 80
End: 2020-01-10

## 2020-01-10 ENCOUNTER — HOSPITAL ENCOUNTER (INPATIENT)
Facility: HOSPITAL | Age: 80
LOS: 1 days | Discharge: HOME HEALTH | DRG: 948 | End: 2020-01-12
Attending: EMERGENCY MEDICINE | Admitting: INTERNAL MEDICINE
Payer: COMMERCIAL

## 2020-01-10 DIAGNOSIS — R19.7 DIARRHEA: ICD-10-CM

## 2020-01-10 DIAGNOSIS — E86.0 DEHYDRATION: Primary | ICD-10-CM

## 2020-01-10 DIAGNOSIS — C7B.8 NEUROENDOCRINE CARCINOMA METASTATIC TO LIVER (HCC): ICD-10-CM

## 2020-01-10 DIAGNOSIS — C7A.8 NEUROENDOCRINE CARCINOMA METASTATIC TO LIVER (HCC): ICD-10-CM

## 2020-01-10 LAB
ALBUMIN SERPL BCP-MCNC: 3.7 G/DL (ref 3.5–5.7)
ALP SERPL-CCNC: 120 U/L (ref 55–165)
ALT SERPL W P-5'-P-CCNC: 12 U/L (ref 7–52)
ANION GAP SERPL CALCULATED.3IONS-SCNC: 10 MMOL/L (ref 4–13)
AST SERPL W P-5'-P-CCNC: 11 U/L (ref 13–39)
BACTERIA UR QL AUTO: ABNORMAL /HPF
BASOPHILS # BLD AUTO: 0.1 THOUSANDS/ΜL (ref 0–0.1)
BASOPHILS NFR BLD AUTO: 1 % (ref 0–2)
BILIRUB SERPL-MCNC: 0.3 MG/DL (ref 0.2–1)
BILIRUB UR QL STRIP: NEGATIVE
BNP SERPL-MCNC: 253 PG/ML (ref 1–100)
BUN SERPL-MCNC: 19 MG/DL (ref 7–25)
CALCIUM SERPL-MCNC: 8.7 MG/DL (ref 8.6–10.5)
CHLORIDE SERPL-SCNC: 106 MMOL/L (ref 98–107)
CLARITY UR: ABNORMAL
CO2 SERPL-SCNC: 23 MMOL/L (ref 21–31)
COLOR UR: YELLOW
CREAT SERPL-MCNC: 2.22 MG/DL (ref 0.7–1.3)
D DIMER PPP FEU-MCNC: 0.68 UG/ML FEU
EOSINOPHIL # BLD AUTO: 0.2 THOUSAND/ΜL (ref 0–0.61)
EOSINOPHIL NFR BLD AUTO: 2 % (ref 0–5)
ERYTHROCYTE [DISTWIDTH] IN BLOOD BY AUTOMATED COUNT: 15.5 % (ref 11.5–14.5)
GFR SERPL CREATININE-BSD FRML MDRD: 27 ML/MIN/1.73SQ M
GLUCOSE SERPL-MCNC: 133 MG/DL (ref 65–140)
GLUCOSE SERPL-MCNC: 236 MG/DL (ref 65–99)
GLUCOSE UR STRIP-MCNC: NEGATIVE MG/DL
HCT VFR BLD AUTO: 34.8 % (ref 42–47)
HGB BLD-MCNC: 11.3 G/DL (ref 14–18)
HGB UR QL STRIP.AUTO: ABNORMAL
KETONES UR STRIP-MCNC: NEGATIVE MG/DL
LEUKOCYTE ESTERASE UR QL STRIP: ABNORMAL
LYMPHOCYTES # BLD AUTO: 1.5 THOUSANDS/ΜL (ref 0.6–4.47)
LYMPHOCYTES NFR BLD AUTO: 17 % (ref 21–51)
MCH RBC QN AUTO: 29.4 PG (ref 26–34)
MCHC RBC AUTO-ENTMCNC: 32.6 G/DL (ref 31–37)
MCV RBC AUTO: 90 FL (ref 81–99)
MONOCYTES # BLD AUTO: 0.8 THOUSAND/ΜL (ref 0.17–1.22)
MONOCYTES NFR BLD AUTO: 9 % (ref 2–12)
NEUTROPHILS # BLD AUTO: 6.7 THOUSANDS/ΜL (ref 1.4–6.5)
NEUTS SEG NFR BLD AUTO: 72 % (ref 42–75)
NITRITE UR QL STRIP: NEGATIVE
NON-SQ EPI CELLS URNS QL MICRO: ABNORMAL /HPF
OTHER STN SPEC: ABNORMAL
PH UR STRIP.AUTO: 5.5 [PH]
PLATELET # BLD AUTO: 197 THOUSANDS/UL (ref 149–390)
PMV BLD AUTO: 8.2 FL (ref 8.6–11.7)
POTASSIUM SERPL-SCNC: 3.5 MMOL/L (ref 3.5–5.5)
PROT SERPL-MCNC: 7 G/DL (ref 6.4–8.9)
PROT UR STRIP-MCNC: ABNORMAL MG/DL
RBC # BLD AUTO: 3.86 MILLION/UL (ref 4.3–5.9)
RBC #/AREA URNS AUTO: ABNORMAL /HPF
SODIUM SERPL-SCNC: 139 MMOL/L (ref 134–143)
SP GR UR STRIP.AUTO: 1.01 (ref 1–1.03)
TROPONIN I SERPL-MCNC: <0.03 NG/ML
UROBILINOGEN UR QL STRIP.AUTO: 0.2 E.U./DL
WBC # BLD AUTO: 9.3 THOUSAND/UL (ref 4.8–10.8)
WBC #/AREA URNS AUTO: ABNORMAL /HPF

## 2020-01-10 PROCEDURE — 81001 URINALYSIS AUTO W/SCOPE: CPT | Performed by: EMERGENCY MEDICINE

## 2020-01-10 PROCEDURE — 85379 FIBRIN DEGRADATION QUANT: CPT | Performed by: EMERGENCY MEDICINE

## 2020-01-10 PROCEDURE — 96360 HYDRATION IV INFUSION INIT: CPT

## 2020-01-10 PROCEDURE — 99285 EMERGENCY DEPT VISIT HI MDM: CPT

## 2020-01-10 PROCEDURE — 71250 CT THORAX DX C-: CPT

## 2020-01-10 PROCEDURE — 87086 URINE CULTURE/COLONY COUNT: CPT | Performed by: EMERGENCY MEDICINE

## 2020-01-10 PROCEDURE — 83880 ASSAY OF NATRIURETIC PEPTIDE: CPT | Performed by: EMERGENCY MEDICINE

## 2020-01-10 PROCEDURE — 99220 PR INITIAL OBSERVATION CARE/DAY 70 MINUTES: CPT | Performed by: NURSE PRACTITIONER

## 2020-01-10 PROCEDURE — 93005 ELECTROCARDIOGRAM TRACING: CPT

## 2020-01-10 PROCEDURE — 82948 REAGENT STRIP/BLOOD GLUCOSE: CPT

## 2020-01-10 PROCEDURE — 36415 COLL VENOUS BLD VENIPUNCTURE: CPT | Performed by: EMERGENCY MEDICINE

## 2020-01-10 PROCEDURE — 87077 CULTURE AEROBIC IDENTIFY: CPT | Performed by: EMERGENCY MEDICINE

## 2020-01-10 PROCEDURE — 84484 ASSAY OF TROPONIN QUANT: CPT | Performed by: EMERGENCY MEDICINE

## 2020-01-10 PROCEDURE — 96361 HYDRATE IV INFUSION ADD-ON: CPT

## 2020-01-10 PROCEDURE — 99284 EMERGENCY DEPT VISIT MOD MDM: CPT | Performed by: EMERGENCY MEDICINE

## 2020-01-10 PROCEDURE — 85025 COMPLETE CBC W/AUTO DIFF WBC: CPT | Performed by: EMERGENCY MEDICINE

## 2020-01-10 PROCEDURE — 80053 COMPREHEN METABOLIC PANEL: CPT | Performed by: EMERGENCY MEDICINE

## 2020-01-10 RX ORDER — HYDRALAZINE HYDROCHLORIDE 20 MG/ML
10 INJECTION INTRAMUSCULAR; INTRAVENOUS EVERY 6 HOURS PRN
Status: DISCONTINUED | OUTPATIENT
Start: 2020-01-10 | End: 2020-01-12 | Stop reason: HOSPADM

## 2020-01-10 RX ORDER — HEPARIN SODIUM 5000 [USP'U]/ML
5000 INJECTION, SOLUTION INTRAVENOUS; SUBCUTANEOUS EVERY 8 HOURS SCHEDULED
Status: DISCONTINUED | OUTPATIENT
Start: 2020-01-10 | End: 2020-01-12 | Stop reason: HOSPADM

## 2020-01-10 RX ORDER — TAMSULOSIN HYDROCHLORIDE 0.4 MG/1
0.4 CAPSULE ORAL
Status: DISCONTINUED | OUTPATIENT
Start: 2020-01-10 | End: 2020-01-12 | Stop reason: HOSPADM

## 2020-01-10 RX ORDER — ASPIRIN 81 MG/1
81 TABLET, CHEWABLE ORAL DAILY
Status: DISCONTINUED | OUTPATIENT
Start: 2020-01-11 | End: 2020-01-12 | Stop reason: HOSPADM

## 2020-01-10 RX ORDER — AMLODIPINE BESYLATE 5 MG/1
10 TABLET ORAL DAILY
Status: DISCONTINUED | OUTPATIENT
Start: 2020-01-11 | End: 2020-01-12 | Stop reason: HOSPADM

## 2020-01-10 RX ORDER — BACITRACIN, NEOMYCIN, POLYMYXIN B 400; 3.5; 5 [USP'U]/G; MG/G; [USP'U]/G
1 OINTMENT TOPICAL DAILY
Status: DISCONTINUED | OUTPATIENT
Start: 2020-01-11 | End: 2020-01-12 | Stop reason: HOSPADM

## 2020-01-10 RX ORDER — COLCHICINE 0.6 MG/1
0.6 TABLET ORAL DAILY
Status: DISCONTINUED | OUTPATIENT
Start: 2020-01-11 | End: 2020-01-12 | Stop reason: HOSPADM

## 2020-01-10 RX ORDER — METHENAMINE HIPPURATE 1000 MG/1
1 TABLET ORAL 3 TIMES DAILY
Status: DISCONTINUED | OUTPATIENT
Start: 2020-01-10 | End: 2020-01-12 | Stop reason: HOSPADM

## 2020-01-10 RX ORDER — PANTOPRAZOLE SODIUM 40 MG/1
40 TABLET, DELAYED RELEASE ORAL
Status: DISCONTINUED | OUTPATIENT
Start: 2020-01-11 | End: 2020-01-12 | Stop reason: HOSPADM

## 2020-01-10 RX ORDER — SACCHAROMYCES BOULARDII 250 MG
250 CAPSULE ORAL 2 TIMES DAILY
Status: DISCONTINUED | OUTPATIENT
Start: 2020-01-10 | End: 2020-01-12 | Stop reason: HOSPADM

## 2020-01-10 RX ORDER — CHOLECALCIFEROL (VITAMIN D3) 10 MCG
1 TABLET ORAL
Status: DISCONTINUED | OUTPATIENT
Start: 2020-01-10 | End: 2020-01-12 | Stop reason: HOSPADM

## 2020-01-10 RX ORDER — OXYCODONE HYDROCHLORIDE AND ACETAMINOPHEN 5; 325 MG/1; MG/1
1 TABLET ORAL EVERY 4 HOURS PRN
Status: DISCONTINUED | OUTPATIENT
Start: 2020-01-10 | End: 2020-01-12 | Stop reason: HOSPADM

## 2020-01-10 RX ORDER — SIMETHICONE 80 MG
80 TABLET,CHEWABLE ORAL EVERY 6 HOURS PRN
Status: DISCONTINUED | OUTPATIENT
Start: 2020-01-10 | End: 2020-01-12 | Stop reason: HOSPADM

## 2020-01-10 RX ORDER — SODIUM BICARBONATE 650 MG/1
650 TABLET ORAL
Status: DISCONTINUED | OUTPATIENT
Start: 2020-01-10 | End: 2020-01-12 | Stop reason: HOSPADM

## 2020-01-10 RX ORDER — MELATONIN
1000 DAILY
Status: DISCONTINUED | OUTPATIENT
Start: 2020-01-11 | End: 2020-01-12 | Stop reason: HOSPADM

## 2020-01-10 RX ORDER — SODIUM CHLORIDE 9 MG/ML
75 INJECTION, SOLUTION INTRAVENOUS CONTINUOUS
Status: DISCONTINUED | OUTPATIENT
Start: 2020-01-10 | End: 2020-01-12

## 2020-01-10 RX ORDER — ACETAMINOPHEN 325 MG/1
650 TABLET ORAL EVERY 6 HOURS PRN
Status: DISCONTINUED | OUTPATIENT
Start: 2020-01-10 | End: 2020-01-12 | Stop reason: HOSPADM

## 2020-01-10 RX ADMIN — HEPARIN SODIUM 5000 UNITS: 5000 INJECTION, SOLUTION INTRAVENOUS; SUBCUTANEOUS at 21:42

## 2020-01-10 RX ADMIN — Medication 250 MG: at 21:41

## 2020-01-10 RX ADMIN — SODIUM BICARBONATE 650 MG: 650 TABLET ORAL at 21:41

## 2020-01-10 RX ADMIN — SODIUM CHLORIDE 500 ML: 0.9 INJECTION, SOLUTION INTRAVENOUS at 16:51

## 2020-01-10 RX ADMIN — Medication 1 CAPSULE: at 21:41

## 2020-01-10 RX ADMIN — TAMSULOSIN HYDROCHLORIDE 0.4 MG: 0.4 CAPSULE ORAL at 21:41

## 2020-01-10 RX ADMIN — SODIUM CHLORIDE 250 ML: 0.9 INJECTION, SOLUTION INTRAVENOUS at 14:50

## 2020-01-10 RX ADMIN — INSULIN DETEMIR 10 UNITS: 100 INJECTION, SOLUTION SUBCUTANEOUS at 21:53

## 2020-01-10 RX ADMIN — SODIUM CHLORIDE 75 ML/HR: 9 INJECTION, SOLUTION INTRAVENOUS at 20:08

## 2020-01-10 RX ADMIN — METHENAMINE HIPPURATE 1 G: 1 TABLET ORAL at 21:42

## 2020-01-10 NOTE — ASSESSMENT & PLAN NOTE
Malnutrition Findings:           BMI Findings: Body mass index is 22 81 kg/m²     · Dietary supplement

## 2020-01-10 NOTE — ASSESSMENT & PLAN NOTE
· Follow up with Dr Vu Prime outpatient   · Not a candidate for the [facility] treatment   · Currently on Lanreotide injections 120 mg every 4 weeks- next dose 1/14/2020  · Patient was recently seen on 1/7/20 and at that time symptoms appears to be controlling   · Increasing diarrhea from 3 times a day to 6-7x over the past 2 days   · frequency etiology is unclear- patient just finished a course of Augmentin today (total of 7 days)  · Rule out infectious course  · Will send stool for studies   · Supportive care   · Low residue/low fiber diet

## 2020-01-10 NOTE — ASSESSMENT & PLAN NOTE
Lab Results   Component Value Date    HGBA1C 8 1 (H) 06/14/2019       No results for input(s): POCGLU in the last 72 hours      Blood Sugar Average: Last 72 hrs:  ·  continue will continue with Lantus at nighttime  · Place the patient on insulin sliding scale

## 2020-01-10 NOTE — ASSESSMENT & PLAN NOTE
· With ambulatory gait dysfunction  · Suspected that this increased generalized weakness is due to increasing amount of diarrhea over the past 48 hours  · Will consult PT and OT  · Gentle IV fluid  · Supportive care

## 2020-01-10 NOTE — ED PROVIDER NOTES
History  Chief Complaint   Patient presents with    Diarrhea     ongoing for about one week, atleast four times a day     Weakness - Generalized     HPI  Patient is a 60-year-old male that reports the emergency department with multiple episodes of watery diarrhea for the past 2 days  He notes progressive weakness associated with this  No abdominal pain, no abdominal discomfort  No abdominal tenderness  He reports mild lightheadedness, no cough or shortness of breath  He does have a chronic indwelling Haile with elevated white blood cell count his urine but he denies any crampy suprapubic pain or fevers, chills  Medical decision making:  Suspect 60-year-old male, suspect dehydration secondary to diarrhea, will admit given poor ambulatory function an inability of his family member to take care of him at home  Prior to Admission Medications   Prescriptions Last Dose Informant Patient Reported? Taking?    Insulin Syringe-Needle U-100 30G X 1/2" 1 ML MISC Past Week at Unknown time Spouse/Significant Other No Yes   Sig: by Does not apply route 3 (three) times a day   acetaminophen (TYLENOL) 325 mg tablet Unknown at Unknown time Spouse/Significant Other Yes No   Sig: Take by mouth as needed   amLODIPine (NORVASC) 10 mg tablet 1/10/2020 at 0900 Spouse/Significant Other Yes Yes   Sig: Take 10 mg by mouth daily   amoxicillin-clavulanate (AUGMENTIN) 500-125 mg per tablet 1/10/2020 at 0900 Spouse/Significant Other Yes Yes   aspirin 81 MG tablet 1/10/2020 at 0900 Spouse/Significant Other Yes Yes   Sig: Take 81 mg by mouth daily   b complex-vitamin C-folic acid (NEPHROCAPS) 1 mg capsule 1/10/2020 at 0900 Spouse/Significant Other No Yes   Sig: Take 1 capsule by mouth daily with dinner   cholecalciferol (VITAMIN D3) 1,000 units tablet 1/10/2020 at 0900 Spouse/Significant Other Yes Yes   Sig: Take 1,000 Units by mouth daily   colchicine (COLCRYS) 0 6 mg tablet 1/10/2020 at 0900 Spouse/Significant Other Yes Yes   Sig: Take 0 6 mg by mouth daily   insulin detemir (LEVEMIR) 100 units/mL subcutaneous injection 1/9/2020 at 2100 Spouse/Significant Other Yes Yes   Sig: Inject 10 Units under the skin daily at bedtime   insulin lispro (HumaLOG) 100 units/mL injection Past Week at Unknown time Spouse/Significant Other Yes Yes   Sig: Inject under the skin 3 (three) times a day before meals   methenamine hippurate (HIPREX) 1 g tablet 1/10/2020 at 1200 Spouse/Significant Other Yes Yes   Sig: Take 1 g by mouth 3 (three) times a day   oxyCODONE-acetaminophen (PERCOCET) 5-325 mg per tablet Unknown at Unknown time Spouse/Significant Other Yes No   Sig: Take 1 tablet by mouth every 4 (four) hours as needed for moderate pain   pantoprazole (PROTONIX) 40 mg tablet 1/10/2020 at 0900 Spouse/Significant Other No Yes   Sig: Take 1 tablet (40 mg total) by mouth daily in the early morning   simethicone (MYLICON) 80 mg chewable tablet Unknown at Unknown time Spouse/Significant Other No No   Sig: Chew 1 tablet (80 mg total) every 6 (six) hours as needed for flatulence   sodium bicarbonate 650 mg tablet 1/10/2020 at 1200 Spouse/Significant Other No Yes   Sig: Take 1 tablet (650 mg total) by mouth 2 (two) times daily after meals   tamsulosin (FLOMAX) 0 4 mg 1/9/2020 at 1800 Spouse/Significant Other No Yes   Sig: Take 1 capsule (0 4 mg total) by mouth daily with dinner      Facility-Administered Medications: None       Past Medical History:   Diagnosis Date    Acute pancreatitis without infection or necrosis 9/26/2019    Anemia of chronic renal failure     BPH (benign prostatic hyperplasia)     Cancer (HCC)     liver cancer    Cardiac disease     [facility], stage 3 [facility] [address] [facility]     Coronary artery disease     Diabetes mellitus ([facility] [address] [facility]     DJD (degenerative joint disease)     Essential hypertension     Gait disturbance     Generalized weakness     GERD (gastroesophageal reflux disease)     Gout     History of transfusion     Hydronephrosis  Hyperlipidemia     Hypertension     Liver cancer ([facility] [address] [facility]     Pressure injury of skin     Proteinuria     Renal disorder     Retention of urine     Thrombophlebitis migrans     Type 2 diabetes mellitus (HCC)     Type 2 diabetes 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, with long-term current use of insulin ([facility] [address] ) 1/23/2019       Past Surgical History:   Procedure Laterality Date    CORONARY ANGIOPLASTY WITH STENT PLACEMENT      IR IMAGE GUIDED BIOPSY/ASPIRATION LIVER  1/24/2019    IR TUBE PLACEMENT ROQUE  9/6/2019       Family History   Problem Relation Age of Onset    Cancer Mother     Hypertension Father     Cancer Brother     Cancer Maternal Grandmother     Cancer Paternal Aunt      I have reviewed and agree with the history as documented  Social History     Tobacco Use    Smoking status: Never Smoker    Smokeless tobacco: Never Used   Substance Use Topics    Alcohol use: Never     Frequency: Never    Drug use: Never        Review of Systems   Constitutional: Positive for fatigue  Gastrointestinal: Positive for diarrhea  All other systems reviewed and are negative  Physical Exam  Physical Exam   Constitutional: He is oriented to person, place, and time  He appears well-developed  Elderly and frail   HENT:   Head: Normocephalic and atraumatic  Eyes: Pupils are equal, round, and reactive to light  EOM are normal    Neck: Normal range of motion  Neck supple  Cardiovascular: Normal rate, regular rhythm and normal heart sounds  No murmur heard  Pulmonary/Chest: Effort normal and breath sounds normal  No respiratory distress  He has no wheezes  Abdominal: Soft  Bowel sounds are normal  He exhibits no distension  There is no tenderness  Roque tube present    Genitourinary:   Genitourinary Comments: Haile present   Musculoskeletal: Normal range of motion  He exhibits no edema or tenderness  Neurological: He is alert and oriented to person, place, and time   No cranial nerve deficit  Coordination normal    Skin: Skin is warm and dry  He is not diaphoretic  No erythema  Psychiatric: He has a normal mood and affect  His behavior is normal    Nursing note and vitals reviewed        Vital Signs  ED Triage Vitals [01/10/20 1440]   Temperature Pulse Respirations Blood Pressure SpO2   98 5 °F (36 9 °C) 86 16 161/76 100 %      Temp Source Heart Rate Source Patient Position - Orthostatic VS BP Location FiO2 (%)   Temporal Monitor Sitting Left arm --      Pain Score       No Pain           Vitals:    01/10/20 1900 01/10/20 2222 01/11/20 0814 01/11/20 1523   BP: (!) 196/80 153/70 147/69 134/60   Pulse: 84 80 70 76   Patient Position - Orthostatic VS: Lying  Lying Lying         Visual Acuity      ED Medications  Medications   acetaminophen (TYLENOL) tablet 650 mg (has no administration in time range)   amLODIPine (NORVASC) tablet 10 mg (10 mg Oral Given 1/11/20 1003)   aspirin chewable tablet 81 mg (81 mg Oral Given 1/11/20 1003)   b complex-vitamin C-folic acid (NEPHROCAPS) capsule 1 capsule (1 capsule Oral Given 1/11/20 1749)   cholecalciferol (VITAMIN D3) tablet 1,000 Units (1,000 Units Oral Given 1/11/20 1003)   colchicine (COLCRYS) tablet 0 6 mg (0 6 mg Oral Given 1/11/20 1002)   insulin detemir (LEVEMIR) subcutaneous injection 10 Units (10 Units Subcutaneous Given 1/11/20 2143)   methenamine hippurate (HIPREX) tablet 1 g (1 g Oral Given 1/11/20 2142)   oxyCODONE-acetaminophen (PERCOCET) 5-325 mg per tablet 1 tablet (has no administration in time range)   pantoprazole (PROTONIX) EC tablet 40 mg (40 mg Oral Given 1/11/20 0539)   simethicone (MYLICON) chewable tablet 80 mg (has no administration in time range)   sodium bicarbonate tablet 650 mg (650 mg Oral Given 1/11/20 1749)   tamsulosin (FLOMAX) capsule 0 4 mg (0 4 mg Oral Given 1/11/20 1749)   sodium chloride 0 9 % infusion (75 mL/hr Intravenous New Bag 1/11/20 1004)   heparin (porcine) subcutaneous injection 5,000 Units (5,000 Units Subcutaneous Given 1/11/20 2142)   insulin lispro (HumaLOG) 100 units/mL subcutaneous injection 1-6 Units (4 Units Subcutaneous Given 1/11/20 1749)   insulin lispro (HumaLOG) 100 units/mL subcutaneous injection 1-6 Units (3 Units Subcutaneous Given 1/11/20 2143)   neomycin-bacitracin-polymyxin b (NEOSPORIN) ointment 1 small application (1 small application Topical Given 1/11/20 1002)   hydrALAZINE (APRESOLINE) injection 10 mg (has no administration in time range)   saccharomyces boulardii (FLORASTOR) capsule 250 mg (250 mg Oral Given 1/11/20 1749)   sodium chloride 0 9 % bolus 250 mL (0 mL Intravenous Stopped 1/10/20 1530)   sodium chloride 0 9 % bolus 500 mL (0 mL Intravenous Stopped 1/10/20 1808)   potassium chloride (K-DUR,KLOR-CON) CR tablet 40 mEq (40 mEq Oral Given 1/11/20 1003)   magnesium sulfate 2 g/50 mL IVPB (premix) 2 g (2 g Intravenous New Bag 1/11/20 1002)       Diagnostic Studies  Results Reviewed     Procedure Component Value Units Date/Time    Urine culture [[id number]]  (Abnormal) Collected:  01/10/20 1454    Lab Status:  Preliminary result Specimen:  Urine, Indwelling Haile Catheter Updated:  01/11/20 1232     Urine Culture >100,000 cfu/ml Yeast    Urine Microscopic [[id number]]  (Abnormal) Collected:  01/10/20 1454    Lab Status:  Final result Specimen:  Urine, Indwelling Haile Catheter Updated:  01/10/20 1516     RBC, UA 1-2 /hpf      WBC, UA Innumerable /hpf      Epithelial Cells Occasional /hpf      Bacteria, UA Occasional /hpf      OTHER OBSERVATIONS Yeast Cells Present    B-Type Natriuretic Peptide ([facility]) [[id number]]  (Abnormal) Collected:  01/10/20 1446    Lab Status:  Final result Specimen:  Blood from Arm, Right Updated:  01/10/20 1515      pg/mL     Troponin I [[id number]]  (Normal) Collected:  01/10/20 1443    Lab Status:  Final result Specimen:  Blood from Arm, Right Updated:  01/10/20 1515     Troponin I <0 03 ng/mL     Comprehensive metabolic panel [[id number]] (Abnormal) Collected:  01/10/20 1443    Lab Status:  Final result Specimen:  Blood from Arm, Right Updated:  01/10/20 1509     Sodium 139 mmol/L      Potassium 3 5 mmol/L      Chloride 106 mmol/L      CO2 23 mmol/L      ANION GAP 10 mmol/L      BUN 19 mg/dL      Creatinine 2 22 mg/dL      Glucose 236 mg/dL      Calcium 8 7 mg/dL      AST 11 U/L      ALT 12 U/L      Alkaline Phosphatase 120 U/L      Total Protein 7 0 g/dL      Albumin 3 7 g/dL      Total Bilirubin 0 30 mg/dL      eGFR 27 ml/min/1 73sq m     Narrative:       National Kidney Disease Foundation guidelines for Chronic Kidney Disease (CKD):     Stage 1 with normal or high GFR (GFR > 90 mL/min/1 73 square meters)    Stage 2 Mild CKD (GFR = 60-89 mL/min/1 73 square meters)    Stage 3A Moderate CKD (GFR = 45-59 mL/min/1 73 square meters)    Stage 3B Moderate CKD (GFR = 30-44 mL/min/1 73 square meters)    Stage 4 Severe CKD (GFR = 15-29 mL/min/1 73 square meters)    Stage 5 End Stage CKD (GFR <15 mL/min/1 73 square meters)  Note: GFR calculation is accurate only with a steady state creatinine    UA w Reflex to Microscopic w Reflex to Culture [[id number]]  (Abnormal) Collected:  01/10/20 1454    Lab Status:  Final result Specimen:  Urine, Indwelling Haile Catheter Updated:  01/10/20 1505     Color, UA Yellow     Clarity, UA Slightly Cloudy     Specific Gravity, UA 1 015     pH, UA 5 5     Leukocytes, UA 2+     Nitrite, UA Negative     Protein, UA Trace mg/dl      Glucose, UA Negative mg/dl      Ketones, UA Negative mg/dl      Urobilinogen, UA 0 2 E U /dl      Bilirubin, UA Negative     Blood, UA 1+    D-Dimer [[id number]]  (Abnormal) Collected:  01/10/20 1443    Lab Status:  Final result Specimen:  Blood from Arm, Right Updated:  01/10/20 1502     D-Dimer, Quant 0 68 ug/ml FEU     CBC and differential [[id number]]  (Abnormal) Collected:  01/10/20 1443    Lab Status:  Final result Specimen:  Blood from Arm, Right Updated:  01/10/20 1453     WBC 9 30 Thousand/uL      RBC 3 86 Million/uL      Hemoglobin 11 3 g/dL      Hematocrit 34 8 %      MCV 90 fL      MCH 29 4 pg      MCHC 32 6 g/dL      RDW 15 5 %      MPV 8 2 fL      Platelets 691 Thousands/uL      Neutrophils Relative 72 %      Lymphocytes Relative 17 %      Monocytes Relative 9 %      Eosinophils Relative 2 %      Basophils Relative 1 %      Neutrophils Absolute 6 70 Thousands/µL      Lymphocytes Absolute 1 50 Thousands/µL      Monocytes Absolute 0 80 Thousand/µL      Eosinophils Absolute 0 20 Thousand/µL      Basophils Absolute 0 10 Thousands/µL                  CT chest without contrast   Final Result by Kalyani Davis MD (01/10 1636)      Lungs appear clear  No evidence for pneumonia  Ascending aortic aneurysmal dilatation as above  Heavy calcified coronary artery disease  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  Workstation performed: [id number]                    Procedures  Procedures         ED Course  ED Course as of Ty 11 2244   Fri Ty 10, 2020   1440 Ekg sinus, occ pvc, no stemi, lbbb morphology      1506 Patient with normal d-dimer by age adjusted criteria  D-Dimer, Quant(!): 0 68                               MDM      Disposition  Final diagnoses:   Dehydration   Diarrhea     Time reflects when diagnosis was documented in both MDM as applicable and the Disposition within this note     Time User Action Codes Description Comment    1/10/2020  6:34 PM Vinh Gleason, Ab[address] [E86 0] Dehydration     1/10/2020  6:34 PM Abhishek [address] [R19 7] Diarrhea       ED Disposition     ED Disposition Condition Date/Time Comment    Admit Stable Fri Ty 10, 2020  6:34 PM Case was discussed with Wesley and the patient's admission status was agreed to be Admission Status: observation status to the service of Dr Tal Coronado          Follow-up Information     Follow up With Specialties Details Why [id number] Follow up RN will call and set up appt upon discharge [facility] [address] [place]  [phone]            Current Discharge Medication List      CONTINUE these medications which have NOT CHANGED    Details   amLODIPine (NORVASC) 10 mg tablet Take 10 mg by mouth daily      amoxicillin-clavulanate (AUGMENTIN) 500-125 mg per tablet       aspirin 81 MG tablet Take 81 mg by mouth daily      b complex-vitamin C-folic acid (NEPHROCAPS) 1 mg capsule Take 1 capsule by mouth daily with dinner  Qty: 60 capsule, Refills: 0    Associated Diagnoses: Acute kidney injury superimposed on chronic kidney disease (HCC)      cholecalciferol (VITAMIN D3) 1,000 units tablet Take 1,000 Units by mouth daily      colchicine (COLCRYS) 0 6 mg tablet Take 0 6 mg by mouth daily      insulin detemir (LEVEMIR) 100 units/mL subcutaneous injection Inject 10 Units under the skin daily at bedtime      insulin lispro (HumaLOG) 100 units/mL injection Inject under the skin 3 (three) times a day before meals      Insulin Syringe-Needle U-100 30G X 1/2" 1 ML MISC by Does not apply route 3 (three) times a day  Qty: 100 each, Refills: 5    Associated Diagnoses: Type 2 diabetes mellitus with hyperglycemia, with long-term current use of insulin ([facility])      methenamine hippurate (HIPREX) 1 g tablet Take 1 g by mouth 3 (three) times a day      pantoprazole (PROTONIX) 40 mg tablet Take 1 tablet (40 mg total) by mouth daily in the early morning  Refills: 0    Associated Diagnoses: Nausea and vomiting      sodium bicarbonate 650 mg tablet Take 1 tablet (650 mg total) by mouth 2 (two) times daily after meals  Refills: 0    Associated Diagnoses: Metabolic acidosis      tamsulosin (FLOMAX) 0 4 mg Take 1 capsule (0 4 mg total) by mouth daily with dinner  Refills: 0    Associated Diagnoses: Acute renal failure with other specified pathological kidney lesion superimposed on stage 3 chronic kidney disease (HCC)      acetaminophen (TYLENOL) 325 mg tablet Take by mouth as needed      oxyCODONE-acetaminophen (PERCOCET) 5-325 mg per tablet Take 1 tablet by mouth every 4 (four) hours as needed for moderate pain      simethicone (MYLICON) 80 mg chewable tablet Chew 1 tablet (80 mg total) every 6 (six) hours as needed for flatulence  Qty: 90 tablet, Refills: 5    Associated Diagnoses: Gas pain           No discharge procedures on file      ED Provider  Electronically Signed by           Shaunna Murillo MD  01/11/20 7486

## 2020-01-10 NOTE — ED NOTES
Patient is becoming agitated that he is still in the ER, ED physician asked if patient is still being admitted, states he is  Waiting for admission orders        Brody Serrano RN  01/10/20 2874

## 2020-01-10 NOTE — TELEPHONE ENCOUNTER
Isabel Cleaves calling in because Tamiko Thomas has been having extreme diarrhea since yesterday and she has been giving him Imodium and it is not working  They wanted to know if you could send something into the pharmacy ([facility] in [address])

## 2020-01-11 LAB
ALBUMIN SERPL BCP-MCNC: 2.9 G/DL (ref 3.5–5.7)
ALP SERPL-CCNC: 93 U/L (ref 55–165)
ALT SERPL W P-5'-P-CCNC: 10 U/L (ref 7–52)
ANION GAP SERPL CALCULATED.3IONS-SCNC: 7 MMOL/L (ref 4–13)
AST SERPL W P-5'-P-CCNC: 10 U/L (ref 13–39)
BASOPHILS # BLD AUTO: 0.1 THOUSANDS/ΜL (ref 0–0.1)
BASOPHILS NFR BLD AUTO: 1 % (ref 0–2)
BILIRUB SERPL-MCNC: 0.3 MG/DL (ref 0.2–1)
BUN SERPL-MCNC: 17 MG/DL (ref 7–25)
CALCIUM SERPL-MCNC: 8 MG/DL (ref 8.6–10.5)
CHLORIDE SERPL-SCNC: 111 MMOL/L (ref 98–107)
CO2 SERPL-SCNC: 24 MMOL/L (ref 21–31)
CREAT SERPL-MCNC: 2 MG/DL (ref 0.7–1.3)
EOSINOPHIL # BLD AUTO: 0.1 THOUSAND/ΜL (ref 0–0.61)
EOSINOPHIL NFR BLD AUTO: 2 % (ref 0–5)
ERYTHROCYTE [DISTWIDTH] IN BLOOD BY AUTOMATED COUNT: 15.6 % (ref 11.5–14.5)
GFR SERPL CREATININE-BSD FRML MDRD: 31 ML/MIN/1.73SQ M
GLUCOSE SERPL-MCNC: 116 MG/DL (ref 65–140)
GLUCOSE SERPL-MCNC: 138 MG/DL (ref 65–99)
GLUCOSE SERPL-MCNC: 219 MG/DL (ref 65–140)
GLUCOSE SERPL-MCNC: 256 MG/DL (ref 65–140)
GLUCOSE SERPL-MCNC: 292 MG/DL (ref 65–140)
HCT VFR BLD AUTO: 29.7 % (ref 42–47)
HGB BLD-MCNC: 9.6 G/DL (ref 14–18)
LYMPHOCYTES # BLD AUTO: 1.3 THOUSANDS/ΜL (ref 0.6–4.47)
LYMPHOCYTES NFR BLD AUTO: 22 % (ref 21–51)
MAGNESIUM SERPL-MCNC: 1.4 MG/DL (ref 1.9–2.7)
MCH RBC QN AUTO: 29.3 PG (ref 26–34)
MCHC RBC AUTO-ENTMCNC: 32.5 G/DL (ref 31–37)
MCV RBC AUTO: 90 FL (ref 81–99)
MONOCYTES # BLD AUTO: 0.6 THOUSAND/ΜL (ref 0.17–1.22)
MONOCYTES NFR BLD AUTO: 11 % (ref 2–12)
NEUTROPHILS # BLD AUTO: 3.9 THOUSANDS/ΜL (ref 1.4–6.5)
NEUTS SEG NFR BLD AUTO: 64 % (ref 42–75)
PHOSPHATE SERPL-MCNC: 3.2 MG/DL (ref 3–5.5)
PLATELET # BLD AUTO: 148 THOUSANDS/UL (ref 149–390)
PMV BLD AUTO: 8.8 FL (ref 8.6–11.7)
POTASSIUM SERPL-SCNC: 3.4 MMOL/L (ref 3.5–5.5)
PROCALCITONIN SERPL-MCNC: <0.05 NG/ML
PROT SERPL-MCNC: 5.5 G/DL (ref 6.4–8.9)
RBC # BLD AUTO: 3.29 MILLION/UL (ref 4.3–5.9)
SODIUM SERPL-SCNC: 142 MMOL/L (ref 134–143)
WBC # BLD AUTO: 6 THOUSAND/UL (ref 4.8–10.8)

## 2020-01-11 PROCEDURE — 83735 ASSAY OF MAGNESIUM: CPT | Performed by: NURSE PRACTITIONER

## 2020-01-11 PROCEDURE — 87209 SMEAR COMPLEX STAIN: CPT | Performed by: NURSE PRACTITIONER

## 2020-01-11 PROCEDURE — 85025 COMPLETE CBC W/AUTO DIFF WBC: CPT | Performed by: NURSE PRACTITIONER

## 2020-01-11 PROCEDURE — 99232 SBSQ HOSP IP/OBS MODERATE 35: CPT | Performed by: NURSE PRACTITIONER

## 2020-01-11 PROCEDURE — 87493 C DIFF AMPLIFIED PROBE: CPT | Performed by: NURSE PRACTITIONER

## 2020-01-11 PROCEDURE — 84100 ASSAY OF PHOSPHORUS: CPT | Performed by: NURSE PRACTITIONER

## 2020-01-11 PROCEDURE — 87505 NFCT AGENT DETECTION GI: CPT | Performed by: NURSE PRACTITIONER

## 2020-01-11 PROCEDURE — 97166 OT EVAL MOD COMPLEX 45 MIN: CPT

## 2020-01-11 PROCEDURE — 97163 PT EVAL HIGH COMPLEX 45 MIN: CPT

## 2020-01-11 PROCEDURE — 82948 REAGENT STRIP/BLOOD GLUCOSE: CPT

## 2020-01-11 PROCEDURE — 84145 PROCALCITONIN (PCT): CPT | Performed by: NURSE PRACTITIONER

## 2020-01-11 PROCEDURE — 87177 OVA AND PARASITES SMEARS: CPT | Performed by: NURSE PRACTITIONER

## 2020-01-11 PROCEDURE — 80053 COMPREHEN METABOLIC PANEL: CPT | Performed by: NURSE PRACTITIONER

## 2020-01-11 RX ORDER — MAGNESIUM SULFATE HEPTAHYDRATE 40 MG/ML
2 INJECTION, SOLUTION INTRAVENOUS ONCE
Status: COMPLETED | OUTPATIENT
Start: 2020-01-11 | End: 2020-01-11

## 2020-01-11 RX ORDER — POTASSIUM CHLORIDE 20 MEQ/1
40 TABLET, EXTENDED RELEASE ORAL ONCE
Status: COMPLETED | OUTPATIENT
Start: 2020-01-11 | End: 2020-01-11

## 2020-01-11 RX ADMIN — SODIUM BICARBONATE 650 MG: 650 TABLET ORAL at 17:49

## 2020-01-11 RX ADMIN — METHENAMINE HIPPURATE 1 G: 1 TABLET ORAL at 17:49

## 2020-01-11 RX ADMIN — SODIUM BICARBONATE 650 MG: 650 TABLET ORAL at 10:04

## 2020-01-11 RX ADMIN — Medication 250 MG: at 10:03

## 2020-01-11 RX ADMIN — INSULIN DETEMIR 10 UNITS: 100 INJECTION, SOLUTION SUBCUTANEOUS at 21:43

## 2020-01-11 RX ADMIN — ASPIRIN 81 MG 81 MG: 81 TABLET ORAL at 10:03

## 2020-01-11 RX ADMIN — INSULIN LISPRO 2 UNITS: 100 INJECTION, SOLUTION INTRAVENOUS; SUBCUTANEOUS at 12:22

## 2020-01-11 RX ADMIN — PANTOPRAZOLE SODIUM 40 MG: 40 TABLET, DELAYED RELEASE ORAL at 05:39

## 2020-01-11 RX ADMIN — BACITRACIN ZINC, NEOMYCIN SULFATE, AND POLYMYXIN B SULFATE 1 SMALL APPLICATION: 400; 3.5; 5 OINTMENT TOPICAL at 10:02

## 2020-01-11 RX ADMIN — SODIUM CHLORIDE 75 ML/HR: 9 INJECTION, SOLUTION INTRAVENOUS at 10:04

## 2020-01-11 RX ADMIN — COLCHICINE 0.6 MG: 0.6 TABLET, FILM COATED ORAL at 10:02

## 2020-01-11 RX ADMIN — AMLODIPINE BESYLATE 10 MG: 5 TABLET ORAL at 10:03

## 2020-01-11 RX ADMIN — VITAMIN D, TAB 1000IU (100/BT) 1000 UNITS: 25 TAB at 10:03

## 2020-01-11 RX ADMIN — MAGNESIUM SULFATE HEPTAHYDRATE 2 G: 40 INJECTION, SOLUTION INTRAVENOUS at 10:02

## 2020-01-11 RX ADMIN — INSULIN LISPRO 4 UNITS: 100 INJECTION, SOLUTION INTRAVENOUS; SUBCUTANEOUS at 17:49

## 2020-01-11 RX ADMIN — HEPARIN SODIUM 5000 UNITS: 5000 INJECTION, SOLUTION INTRAVENOUS; SUBCUTANEOUS at 21:42

## 2020-01-11 RX ADMIN — HEPARIN SODIUM 5000 UNITS: 5000 INJECTION, SOLUTION INTRAVENOUS; SUBCUTANEOUS at 05:39

## 2020-01-11 RX ADMIN — TAMSULOSIN HYDROCHLORIDE 0.4 MG: 0.4 CAPSULE ORAL at 17:49

## 2020-01-11 RX ADMIN — POTASSIUM CHLORIDE 40 MEQ: 1500 TABLET, EXTENDED RELEASE ORAL at 10:03

## 2020-01-11 RX ADMIN — Medication 250 MG: at 17:49

## 2020-01-11 RX ADMIN — METHENAMINE HIPPURATE 1 G: 1 TABLET ORAL at 10:03

## 2020-01-11 RX ADMIN — Medication 1 CAPSULE: at 17:49

## 2020-01-11 RX ADMIN — HEPARIN SODIUM 5000 UNITS: 5000 INJECTION, SOLUTION INTRAVENOUS; SUBCUTANEOUS at 14:06

## 2020-01-11 RX ADMIN — METHENAMINE HIPPURATE 1 G: 1 TABLET ORAL at 21:42

## 2020-01-11 RX ADMIN — INSULIN LISPRO 3 UNITS: 100 INJECTION, SOLUTION INTRAVENOUS; SUBCUTANEOUS at 21:43

## 2020-01-11 NOTE — H&P
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)  Assessment & Plan  · Follow up with Dr Lucretia Baum outpatient   · Not a candidate for the [facility] treatment   · Currently on Lanreotide injections 120 mg every 4 weeks- next dose 1/14/2020  · Patient was recently seen on 1/7/20 and at that time symptoms appears to be controlling   · Increasing diarrhea from 3 times a day to 6-7x over the past 2 days   · frequency etiology is unclear- patient just finished a course of Augmentin today (total of 7 days)  · Rule out infectious course  · Will send stool for studies   · Supportive care   · Low residue/low fiber diet     CKD (chronic kidney disease) stage 3, GFR 30-59 ml/min ([facility])  Assessment & Plan  · With baseline creatinine between 2 2-2 5 mg/dL   · Currently at baseline   · Will monitor closely   · Avoid any nephrotoxins    Urinary retention  Assessment & Plan  · With chronic garcia catheter and frequent UTIs  · Will continue with flomax and hiprex  · No urinary symptoms, afebrile  · Will hold off on check urine culture    Biliary sludge  Assessment & Plan  · S/p percutaneous cholecystostomy tube   · Last IR tube check/exchange done on 12/10/2019    Moderate protein-calorie malnutrition ([address] )  Assessment & Plan  Malnutrition Findings:           BMI Findings: Body mass index is 22 81 kg/m²     · Dietary supplement     Pressure injury of right heel, unstageable ([address] )  Assessment & Plan  · POA  · Continue local wound care · Frequent turn and reposition    Essential hypertension  Assessment & Plan  · Monitor BP closely   · Slightly elevated on admission   · Will make dose adjustment as needed  · Will continue with amlodipine     Type 2 diabetes 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, with long-term current use of insulin (HCC)  Assessment & Plan  Lab Results   Component Value Date    HGBA1C 8 1 (H) 06/14/2019       No results for input(s): POCGLU in the last 72 hours  Blood Sugar Average: Last 72 hrs:  ·  continue will continue with Lantus at nighttime  · Place the patient on insulin sliding scale    VTE Prophylaxis: Heparin  Code Status: Full Code   POLST: POLST is not applicable to this patient  Discussion with family: girlfriend     Anticipated Length of Stay:  Patient will be admitted on an Observation basis with an anticipated length of stay of  < 2 midnights  Justification for Hospital Stay:  Generalized weakness    Chief Complaint:   Generalized weakness and increasing diarrhea    History of Present Illness:    Nancie Armstrong is a [de-identified] y o  male who presents with generalized weakness with increasing diarrhea over the past 2 days  Patient states that he has been feeling okay  He was seen by his Hematology/Oncology 2 days ago and most of his symptoms were pretty well controlled including diarrhea  His girlfriend stated that he was started on Augmentin 7 days ago by IR as there was any increased drainage and redness from his cholecystostomy tube  Today is his last day of antibiotic  The patient started to develop increasing frequency of nonbloody diarrhea 2 days ago  The patient had 6-7 loose watery bowel movement  He took 2 Imodium today with very minimal improvement  Subsequently his girlfriend called his PCP and was advised to come to the ED for further evaluation  Review of Systems:  Review of Systems   Constitutional: Positive for fatigue  Negative for chills and fever     HENT: Negative for congestion, ear pain, postnasal drip and sore throat  Eyes: Negative for discharge, itching and visual disturbance  Respiratory: Negative for cough, chest tightness and shortness of breath  Cardiovascular: Negative for chest pain, palpitations and leg swelling  Gastrointestinal: Positive for diarrhea  Negative for abdominal pain, nausea and vomiting  Endocrine: Negative for cold intolerance and heat intolerance  Genitourinary: Negative for difficulty urinating, dysuria and hematuria  Musculoskeletal: Positive for gait problem  Negative for back pain and neck pain  Skin: Negative for rash and wound  Neurological: Positive for weakness  Negative for dizziness, speech difficulty, light-headedness and headaches  Psychiatric/Behavioral: Negative for confusion, dysphoric mood and hallucinations         Past Medical and Surgical History:   Past Medical History:   Diagnosis Date    Acute pancreatitis without infection or necrosis 9/26/2019    Anemia of chronic renal failure     BPH (benign prostatic hyperplasia)     Cancer (HCC)     liver cancer    Cardiac disease     Chronic kidney disease, stage 3 (HCC)     Coronary artery disease     Diabetes mellitus ([facility] )     DJD (degenerative joint disease)     Essential hypertension     Gait disturbance     Generalized weakness     GERD (gastroesophageal reflux disease)     Gout     History of transfusion     Hydronephrosis     Hyperlipidemia     Hypertension     Liver cancer ([facility] )     Pressure injury of skin     Proteinuria     Renal disorder     Retention of urine     Thrombophlebitis migrans     Type 2 diabetes mellitus ([address] )     Type 2 diabetes 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, with long-term current use of insulin ([address] ) 1/23/2019       Past Surgical History:   Procedure Laterality Date    CORONARY ANGIOPLASTY WITH STENT PLACEMENT      IR IMAGE GUIDED BIOPSY/ASPIRATION LIVER  1/24/2019    IR TUBE PLACEMENT ROQUE  9/6/2019 Meds/Allergies:  Prior to Admission medications    Medication Sig Start Date End Date Taking?  Authorizing Provider   acetaminophen (TYLENOL) 325 mg tablet Take by mouth as needed    Historical Provider, MD   amLODIPine (NORVASC) 10 mg tablet Take 10 mg by mouth daily    Historical Provider, MD   amoxicillin-clavulanate (AUGMENTIN) 500-125 mg per tablet  1/2/20   Historical Provider, MD   aspirin 81 MG tablet Take 81 mg by mouth daily    Historical Provider, MD   b complex-vitamin C-folic acid (NEPHROCAPS) 1 mg capsule Take 1 capsule by mouth daily with dinner 7/7/19   Flavio Eastman MD   cholecalciferol (VITAMIN D3) 1,000 units tablet Take 1,000 Units by mouth daily    Historical Provider, MD   colchicine (COLCRYS) 0 6 mg tablet Take 0 6 mg by mouth daily    Historical Provider, MD   insulin detemir (LEVEMIR) 100 units/mL subcutaneous injection Inject 10 Units under the skin daily at bedtime    Historical Provider, MD   insulin lispro (HumaLOG) 100 units/mL injection Inject under the skin 3 (three) times a day before meals    Historical Provider, MD   Insulin Syringe-Needle U-100 30G X 1/2" 1 ML MISC by Does not apply route 3 (three) times a day 12/26/19   Laura Madera MD   methenamine hippurate (HIPREX) 1 g tablet Take 1 g by mouth 3 (three) times a day    Historical Provider, MD   oxyCODONE-acetaminophen (PERCOCET) 5-325 mg per tablet Take 1 tablet by mouth every 4 (four) hours as needed for moderate pain    Historical Provider, MD   pantoprazole (PROTONIX) 40 mg tablet Take 1 tablet (40 mg total) by mouth daily in the early morning 2/8/19   Comfort Plummer MD   simethicone (MYLICON) 80 mg chewable tablet Chew 1 tablet (80 mg total) every 6 (six) hours as needed for flatulence 11/18/19   Kiaraearachell Pizano DO   sodium bicarbonate 650 mg tablet Take 1 tablet (650 mg total) by mouth 2 (two) times daily after meals 9/18/19   Yumiko Pitt MD   tamsulosin (FLOMAX) 0 4 mg Take 1 capsule (0 4 mg total) by mouth daily with dinner 2/7/19   Magali Gross MD     I have reviewed home medications with patient family member  Allergies: No Known Allergies    Social History:  Marital Status:    Occupation: retired   Patient Pre-hospital Living Situation: family   Patient Pre-hospital Level of Mobility: assist  Patient Pre-hospital Diet Restrictions: diabetic   Substance Use History:   Social History     Substance and Sexual Activity   Alcohol Use Never    Frequency: Never     Social History     Tobacco Use   Smoking Status Never Smoker   Smokeless Tobacco Never Used     Social History     Substance and Sexual Activity   Drug Use Never       Family History:  I have reviewed the patients family history    Physical Exam:   Vitals:   Blood Pressure: (!) 196/80 (01/10/20 1900)  Pulse: 84 (01/10/20 1900)  Temperature: 99 5 °F (37 5 °C) (01/10/20 1900)  Temp Source: Temporal (01/10/20 1900)  Respirations: 20 (01/10/20 1900)  Height: 5' 8" (172 7 cm) (01/10/20 1900)  Weight - Scale: 75 5 kg (166 lb 7 2 oz) (01/10/20 1900)  SpO2: 100 % (01/10/20 1900)    Physical Exam   Constitutional: He is oriented to person, place, and time  He appears well-developed  No distress  Chronically ill appearing and frail   HENT:   Head: Normocephalic and atraumatic  Mouth/Throat: Oropharynx is clear and moist    Eyes: Pupils are equal, round, and reactive to light  Conjunctivae and EOM are normal    Neck: Normal range of motion  Neck supple  No thyromegaly present  Cardiovascular: Normal rate, regular rhythm, normal heart sounds and intact distal pulses  Pulmonary/Chest: Effort normal and breath sounds normal  No respiratory distress  He has no wheezes  Abdominal: Soft  Bowel sounds are normal  He exhibits no distension  There is no tenderness  Right upper quadrant cholecystostomy tube with greenish bile drainage   Genitourinary:   Genitourinary Comments: Haile catheter present   Musculoskeletal: Normal range of motion   He exhibits no edema or deformity  Neurological: He is alert and oriented to person, place, and time  He has normal reflexes  No cranial nerve deficit  Skin: Skin is warm and dry  No erythema  Psychiatric: He has a normal mood and affect  His behavior is normal  Thought content normal    Vitals reviewed  Additional Data:   Lab Results: I have personally reviewed pertinent reports  Results from last 7 days   Lab Units 01/10/20  1443   WBC Thousand/uL 9 30   HEMOGLOBIN g/dL 11 3*   HEMATOCRIT % 34 8*   PLATELETS Thousands/uL 197   NEUTROS PCT % 72   LYMPHS PCT % 17*   MONOS PCT % 9   EOS PCT % 2     Results from last 7 days   Lab Units 01/10/20  1443   POTASSIUM mmol/L 3 5   CHLORIDE mmol/L 106   CO2 mmol/L 23   BUN mg/dL 19   CREATININE mg/dL 2 22*   CALCIUM mg/dL 8 7   ALK PHOS U/L 120   ALT U/L 12   AST U/L 11*                   Imaging: I have personally reviewed pertinent reports  CT chest without contrast   Final Result by Markel Briscoe MD (01/10 1636)      Lungs appear clear  No evidence for pneumonia  Ascending aortic aneurysmal dilatation as above  Heavy calcified coronary artery disease  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

## 2020-01-11 NOTE — ASSESSMENT & PLAN NOTE
· Monitor BP closely   · Slightly elevated on admission   · Will make dose adjustment as needed  · Will continue with amlodipine

## 2020-01-11 NOTE — PLAN OF CARE
Pt arrived and care plan discussed with significant other and patient and both understood, questions answered,

## 2020-01-11 NOTE — UTILIZATION REVIEW
Initial Clinical Review    Admission: Date/Time/Statement: [email or web address] UPGRADED TO INPT [email or web address] D/T WEAKNESS, DEHYDRATION D/T PERSISTENT DIARRHEA AND GAIT DYSFUNCTION  NEEDS SNF PER PT/OT     01/11/20 1622  Inpatient Admission Once     Transfer Service: General Medicine       Question Answer Comment   Admitting Physician Pritesh Sheikh    Level of Care Med Surg    Estimated length of stay More than 2 Midnights    Certification I certify that inpatient services are medically necessary for this patient for a duration of greater than two midnights  See H&P and MD Progress Notes for additional information about the patient's course of treatment  01/11/20 1621         ED Arrival Information     Expected Arrival Acuity Means of Arrival Escorted By Service Admission Type    - 1/10/2020 14:34 Urgent Wheelchair Other (Comment) General Medicine Urgent    Arrival Complaint    diarrhea        Chief Complaint   Patient presents with    Diarrhea     ongoing for about one week, atleast four times a day     Weakness - Generalized     Assessment/Plan:   · [de-identified] yo male with hx of 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 and CKD and essential htn to ED from home admitted observation upgraded to Inpatient d/t generalized weakness, diarrhea and dehydration with ambulatory gait dysfunction and need for SNF per PT/Ot consult  Increasing diarrhea from 3 times a day to 6-7x over the past 2 days  patient just finished a course of Augmentin today (total of 7 days)  GFR 30-59 ml/min,baseline creatinine between 2 2-2 5 mg/dL, stool for studies  S/p percutaneous cholecystostomy tube   Last IR tube check/exchange done on 12/10/2019  IVF  Lanreotide  Amlodipine  Avoid any nephrotoxins consult PT and OT          ED Triage Vitals [01/10/20 1440]   Temperature Pulse Respirations Blood Pressure SpO2   98 5 °F (36 9 °C) 86 16 161/76 100 %      Temp Source Heart Rate Source Patient Position - Orthostatic VS BP Location FiO2 (%)   Temporal Monitor Sitting Left arm --      Pain Score       No Pain        Wt Readings from Last 1 Encounters:   01/10/20 75 5 kg (166 lb 7 2 oz)     Additional Vital Signs:   01/11/20 0814  98 3 °F (36 8 °C)  70  18  147/69  97 %  None (Room air)  Lying   01/10/20 2222  96 8 °F (36 °C)Abnormal   80  17  153/70  99 %  None (Room air)  --   01/10/20 1900  99 5 °F (37 5 °C)  84  20  196/80Abnormal   100 %  None (Room air)  Lying   01/10/20 1800  --  80  16  174/83Abnormal   100 %  --  --   01/10/20 1730  --  82  21  --  100 %  --  --   01/10/20 1630  --  77  15  --  100 %  --  --   01/10/20 1600  --  76  16  173/84Abnormal   99 %  --  --   01/10/20 1440  98 5 °F (36 9 °C)  86  16  161/76  100 %  None (Room air)  Sitting         Pertinent Labs/Diagnostic Test Results:   Results from last 7 days   Lab Units 01/11/20 01/10/20  1443   WBC Thousand/uL 6 00 9 30   HEMOGLOBIN g/dL 9 6* 11 3*   HEMATOCRIT % 29 7* 34 8*   PLATELETS Thousands/uL 148* 197   NEUTROS ABS Thousands/µL 3 90 6 70*         Results from last 7 days   Lab Units 01/11/20 01/10/20  1443   SODIUM mmol/L 142 139   POTASSIUM mmol/L 3 4* 3 5   CHLORIDE mmol/L 111* 106   CO2 mmol/L 24 23   ANION GAP mmol/L 7 10   BUN mg/dL 17 19   CREATININE mg/dL 2 00* 2 22*   EGFR ml/min/1 73sq m 31 27   CALCIUM mg/dL 8 0* 8 7   MAGNESIUM mg/dL 1 4*  --    PHOSPHORUS mg/dL 3 2  --      Results from last 7 days   Lab Units 01/11/20 01/10/20  1443   AST U/L 10* 11*   ALT U/L 10 12   ALK PHOS U/L 93 120   TOTAL PROTEIN g/dL 5 5* 7 0   ALBUMIN g/dL 2 9* 3 7   TOTAL BILIRUBIN mg/dL 0 30 0 30     Results from last 7 days   Lab Units 01/11/20  1148 01/11/20  0629 01/10/20  2000   POC GLUCOSE mg/dl 219* 116 133     Results from last 7 days   Lab Units 01/11/20 01/10/20  1443   GLUCOSE RANDOM mg/dL 138* 236*       BETA-HYDROXYBUTYRATE   Date Value Ref Range Status   02/05/2019 0 11 0 02 - 0 27 mmol/L Final        Results from last 7 days   Lab Units 01/10/20  1443   TROPONIN I ng/mL <0 03 Results from last 7 days   Lab Units 01/10/20  1443   D-DIMER QUANTITATIVE ug/ml FEU 0 68*       Results from last 7 days   Lab Units 01/10/20  1446   BNP pg/mL 253*       Results from last 7 days   Lab Units 01/10/20  1454   CLARITY UA  Slightly Cloudy*   COLOR UA  Yellow   SPEC GRAV UA  1 015   PH UA  5 5   GLUCOSE UA mg/dl Negative   KETONES UA mg/dl Negative   BLOOD UA  1+*   PROTEIN UA mg/dl Trace*   NITRITE UA  Negative   BILIRUBIN UA  Negative   UROBILINOGEN UA E U /dl 0 2   LEUKOCYTES UA  2+*   WBC UA /hpf Innumerable*   RBC UA /hpf 1-2*   BACTERIA UA /hpf Occasional   EPITHELIAL CELLS WET PREP /hpf Occasional       Results from last 7 days   Lab Units 01/10/20  1454   URINE CULTURE  >100,000 cfu/ml Yeast*     1/10 CT CHEST:Lungs appear clear  No evidence for pneumonia  Ascending aortic aneurysmal dilatation as above  Heavy calcified coronary artery disease    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        ED Treatment:   Medication Administration from 01/10/2020 1434 to 01/10/2020 1852       Date/Time Order Dose Route Action                  01/10/2020 1450 sodium chloride 0 9 % bolus 250 mL 250 mL Intravenous New Bag                  01/10/2020 1651 sodium chloride 0 9 % bolus 500 mL 500 mL Intravenous New Bag          Past Medical History:   Diagnosis Date    Acute pancreatitis without infection or necrosis 9/26/2019    Anemia of chronic renal failure     BPH (benign prostatic hyperplasia)     Cancer (HCC)     liver cancer    Cardiac disease     [facility], [facility]     Coronary artery disease     Diabetes mellitus ([facility]     DJD (degenerative joint disease)     Essential hypertension     Gait disturbance     Generalized weakness     GERD (gastroesophageal reflux disease)     Gout     History of transfusion     Hydronephrosis     Hyperlipidemia     Hypertension     Liver cancer ([facility] )     Pressure injury of skin     Proteinuria     Renal disorder     Retention of urine     Thrombophlebitis migrans     Type 2 diabetes mellitus (HCC)     Type 2 diabetes 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, with long-term current use of insulin ([facility] ) 1/23/2019     Present on Admission:   Weakness generalized   [facility] of right [facility], unstageable [facility]   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 [facility] )   Moderate protein-calorie malnutrition ([facility] )   Biliary sludge   Essential hypertension   Urinary retention   CKD (chronic kidney disease) stage 3, GFR 30-59 ml/min ([facility])      Admitting Diagnosis: Diarrhea [R19 7]  Dehydration [E86 0]  Age/Sex: [de-identified] y o  male  Admission Orders:  Scheduled Medications:    Medications:  amLODIPine 10 mg Oral Daily   aspirin 81 mg Oral Daily   b complex-vitamin C-folic acid 1 capsule Oral Daily With Dinner   cholecalciferol 1,000 Units Oral Daily   colchicine 0 6 mg Oral Daily   heparin (porcine) 5,000 Units Subcutaneous Q8H [facility]   insulin detemir 10 Units Subcutaneous HS   insulin lispro 1-6 Units Subcutaneous TID AC   insulin lispro 1-6 Units Subcutaneous HS   methenamine hippurate 1 g Oral TID   neomycin-bacitracin-polymyxin b 1 small application Topical Daily   pantoprazole 40 mg Oral Early Morning   saccharomyces boulardii 250 mg Oral BID   sodium bicarbonate 650 mg Oral BID after meals   tamsulosin 0 4 mg Oral Daily With Dinner     Continuous IV Infusions:    sodium chloride 75 mL/hr Intravenous Continuous     PRN Meds:    acetaminophen 650 mg Oral Q6H PRN   hydrALAZINE 10 mg Intravenous Q6H PRN   oxyCODONE-acetaminophen 1 tablet Oral Q4H PRN   simethicone 80 mg Oral Q6H PRN     CONS CARB DIET  SEQ COMP DEVICE  PT/OT- SNF RECOMMENDED  OOB  CONTINUOUS CARDIAC MONITORING  IP CONSULT TO CASE MANAGEMENT    Network Utilization Review Department  [email or web address]  ATTENTION: Please call with any questions or concerns to [phone] and carefully listen to the prompts so that you

## 2020-01-11 NOTE — ASSESSMENT & PLAN NOTE
Lab Results   Component Value Date    HGBA1C 8 1 (H) 06/14/2019       Recent Labs     01/10/20  2000 01/11/20  0629   POCGLU 133 116       Blood Sugar Average: Last 72 hrs:  · (P) 124 5 continue will continue with Lantus at nighttime  · Place the patient on insulin sliding scale

## 2020-01-11 NOTE — SOCIAL WORK
Evaluated the pt at the bedside with the [address] [place] [address] present  Pt was admitted to the hospital with diarrhea  Explained the role of CM and the options of discharge planning with the pt  Pt lives with his SO in a  One story home with 2 BETH  SO reports the pt has had a drain in abdomen from a previous GB surgery  The RN indicated the site was red and the pt was put on Amoxicillin  SO states the pt has been having diarrhea after eating his meals  At home the pt gets help with bathing and dressing from the A from [facility] on Mon-Wed-Fri  On days the HHA is not there she assists him  Pt also receives RN and PT services  SO provides all IADL's for the pt  [facility]  Pt PCP is Dr Ron Padilla  SO states she will provide transport home upon discharge  Explained the Observation level of care with the pt and SO  Both verbalized understanding of same  Sent a referral to [facility]  Patient/caregiver received discharge checklist   Content reviewed  Patient/caregiver encouraged to participate in discharge plan of care prior to discharge home  CM reviewed d/c planning process including the following: identifying help at home, patient preference for d/c planning needs, availability of treatment team to discuss questions or concerns patient and/or family may have regarding understanding medications and recognizing signs and symptoms once discharged  CM also encouraged patient to follow up with all recommended appointments after discharge  Patient advised of importance for patient and family to participate in managing patients medical well being

## 2020-01-11 NOTE — PLAN OF CARE
Problem: OCCUPATIONAL THERAPY ADULT  Goal: Performs self-care activities at highest level of function for planned discharge setting  See evaluation for individualized goals  Description  Treatment Interventions: ADL retraining, Functional transfer training, Endurance training, Neuromuscular reeducation, Energy conservation    See flowsheet documentation for full assessment, interventions and recommendations  Note:   Limitation: Decreased ADL status, Decreased Safe judgement during ADL, Decreased endurance, Decreased self-care trans, Decreased high-level ADLs  Prognosis: Good  Assessment: Pt is a [de-identified] y o  male seen for OT evaluation s/p admit to [address] [place] [address] on 1/10/2020 w/ Weakness generalized  Comorbidities affecting pt's functional performance at time of assessment include: Anemia, CA, CKD, CAD, DM, DJD, HTN, Weakness, Hyperlipidemia, HTN  Personal factors affecting pt at time of IE include:steps to enter environment and difficulty performing ADLS  Prior to admission, pt was Mod I with ADLs  Upon evaluation: the following deficits impact occupational performance: decreased balance and decreased tolerance  Pt to benefit from continued skilled OT tx while in the hospital to address deficits as defined above and maximize level of functional independence w ADL's and functional mobility  Occupational Performance areas to address include: bathing/shower, toilet hygiene, dressing, functional mobility and clothing management  From OT standpoint, recommendation at time of d/c would be STR       OT Discharge Recommendation: Short Term Rehab  OT - OK to Discharge: Yes(when medically stable)    Laila Nava MS, OTR/L

## 2020-01-11 NOTE — UTILIZATION REVIEW
Notification of Observation Admission/Observation Authorization Request   This is a Notification of Observation Admission for [facility]  Be advised that this patient was admitted to our facility under Observation Status  Contact Pawan Gates at [phone] for additional admission information  [facility] DEPT  DEDICATED [phone]  Patient Name:   Mela Brumfield   YOB: 1940       Authorization [address]:   [address]  Tax ID: [id number]  NPI: [id number] Attending Provider/NPI: Mai Amaya, [address] Veronika Jules [[id number]]   Attending Physician:  MINISTERIO Ortiz  [facility] ID- [id number]  [address] [place] [address]  Phone 1: [phone]  Fax: [phone]   Place of Service Code: 25     Place of Service Name:  CPT Code for Observation:  On [facility] / CPT 03718   Start Date:      Discharge Date & Time: No discharge date for patient encounter  Type of Admission: Observation Status Discharge Disposition (if discharged): Home with [address] [employer] [facility]   Patient Diagnoses: Diarrhea [R19 7]  Dehydration [E86 0]     Orders: Admission Orders (From admission, onward)     Ordered        01/10/20 1835  Place in Observation (expected length of stay for this patient is less than two midnights)  Once                    Assigned Utilization Review Contact: Pawan Gates  Utilization   Network Utilization Review Department  Phone: [phone]; Fax [phone]  Email: [email or web address]   ATTENTION PAYERS: Please call the assigned Utilization  directly with any questions or concerns ALL voicemails in the department are confidential  Send all requests for admission clinical reviews, approved or denied determinations and any other requests to dedicated fax number belonging to the campus where the patient is receiving treatment

## 2020-01-11 NOTE — ASSESSMENT & PLAN NOTE
· With baseline creatinine between 2 2-2 5 mg/dL   · Currently at baseline   · Will monitor closely   · Avoid any nephrotoxins

## 2020-01-11 NOTE — ASSESSMENT & PLAN NOTE
· Follow up with Dr Karely Gregory outpatient   · Not a candidate for the [facility] treatment   · Currently on Lanreotide injections 120 mg every 4 weeks- next dose 1/14/2020  · Patient was recently seen on 1/7/20 and at that time symptoms appears to be controlling   · Increasing diarrhea from 3 times a day to 6-7x over the past 2 days   · frequency etiology is unclear- patient just finished a course of Augmentin today (total of 7 days)  · Rule out infectious course  · Stool studies- pending   · Supportive care   · Low residue/low fiber diet

## 2020-01-11 NOTE — ASSESSMENT & PLAN NOTE
· With chronic garcia catheter and frequent UTIs  · Will continue with flomax and hiprex  · No urinary symptoms, afebrile  · Will hold off on check urine culture

## 2020-01-11 NOTE — ASSESSMENT & PLAN NOTE
Malnutrition Findings:           BMI Findings: Body mass index is 25 31 kg/m²     · Dietary supplement

## 2020-01-11 NOTE — PLAN OF CARE
Problem: PHYSICAL THERAPY ADULT  Goal: Performs mobility at highest level of function for planned discharge setting  See evaluation for individualized goals  Description  Treatment/Interventions: Functional transfer training, LE strengthening/ROM, Elevations, Therapeutic exercise, Endurance training, Bed mobility, Gait training  Equipment Recommended: Jean Juares       See flowsheet documentation for full assessment, interventions and recommendations  Outcome: Progressing  Note:   Prognosis: Fair  Problem List: Decreased strength, Decreased endurance, Impaired balance, Decreased mobility  Assessment: Pt is [de-identified] y o  male seen for PT evaluation s/p admit to [address] [place] [address] on 1/10/2020 w/ Weakness generalized  PT consulted to assess pt's functional mobility and d/c needs  Order placed for PT eval and tx, w/ up as tolerated order  Comorbidities affecting pt's physical performance at time of assessment include: acute pancreatitis, DM, and DJD  PTA, pt was independent w/ all functional mobility w/ RW, has 2 BETH, lives w/ girlfriend in 1 level house and unemployed  Personal factors affecting pt at time of IE include: ambulating w/ assistive device, stairs to enter home and inability to navigate community distances  Please find objective findings from PT assessment regarding body systems outlined above with impairments and limitations including weakness, impaired balance, decreased endurance, gait deviations, decreased activity tolerance, decreased functional mobility tolerance and fall risk  The following objective measures performed on IE also reveal limitations: Barthel Index: 45/100  Pt's clinical presentation is currently unstable/unpredictable seen in pt's presentation of loose stools, multiple lines, and abnormal labs  Pt to benefit from continued PT tx to address deficits as defined above and maximize level of functional independent mobility and consistency   From PT/mobility standpoint, recommendation at time of d/c would be STR pending progress in order to facilitate return to PLOF  Barriers to Discharge: Inaccessible home environment     Recommendation: Short-term skilled PT     PT - OK to Discharge: Yes    See flowsheet documentation for full assessment     David Jane, PT

## 2020-01-11 NOTE — ASSESSMENT & PLAN NOTE
· With chronic garcia catheter and frequent UTIs  · Will continue with flomax and hiprex  · No urinary symptoms, afebrile

## 2020-01-11 NOTE — PROGRESS NOTES
Progress Note - Arturo Ortiz 1940, [de-identified] y o  male MRN: [id number]    Unit/Bed#: -01 Encounter: [id number]    Primary Care Provider: Joanne Rose DO   Date and time admitted to hospital: 1/10/2020  2:40 PM        * Weakness generalized  Assessment & Plan  · With ambulatory gait dysfunction  · Suspected that this increased generalized weakness is due to increasing amount of diarrhea over the past 48 hours  · No diarrhea overnight  · Patient usually has BM with meals; he had one episode of diarrhea after breakfast this AM  · PT/OT- recommends SNF   · Gentle IV fluid  · Supportive care    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 [facility])  Assessment & Plan  · Follow up with Dr Mirella Prado outpatient   · Not a candidate for the [facility] treatment   · Currently on Lanreotide injections 120 mg every 4 weeks- next dose 1/14/2020  · Patient was recently seen on 1/7/20 and at that time symptoms appears to be controlling   · Increasing diarrhea from 3 times a day to 6-7x over the past 2 days   · frequency etiology is unclear- patient just finished a course of Augmentin today (total of 7 days)  · Rule out infectious course  · Stool studies- pending   · Supportive care   · Low residue/low fiber diet     CKD (chronic kidney disease) stage 3, GFR 30-59 ml/min ([facility])  Assessment & Plan  · With baseline creatinine between 2 2-2 5 mg/dL   · Currently at baseline   · Will monitor closely   · Avoid any nephrotoxins      Urinary retention  Assessment & Plan  · With chronic garcia catheter and frequent UTIs  · Will continue with flomax and hiprex  · No urinary symptoms, afebrile      Biliary sludge  Assessment & Plan  · S/p percutaneous cholecystostomy tube   · Last IR tube check/exchange done on 12/10/2019    Moderate protein-calorie malnutrition ([address] )  Assessment & Plan  Malnutrition Findings:           BMI Findings: Body mass index is 25 31 kg/m²     · Dietary supplement       Pressure injury of right heel, unstageable (HCC)  Assessment & Plan  · POA  · Continue local wound care   · Frequent turn and reposition      Essential hypertension  Assessment & Plan  · Monitor BP closely   · Slightly elevated on admission- much improved currently   · Will make dose adjustment as needed  · Will continue with amlodipine       Type 2 diabetes 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, with long-term current use of insulin (HCC)  Assessment & Plan  Lab Results   Component Value Date    HGBA1C 8 1 (H) 2019       Recent Labs     01/10/20  2000 01/11/20  0629   POCGLU 133 116       Blood Sugar Average: Last 72 hrs:  · (P) 124 5 continue will continue with Lantus at nighttime  · Place the patient on insulin sliding scale          VTE Pharmacologic Prophylaxis: Pharmacologic: Heparin    Patient Centered Rounds: I have performed bedside rounds with nursing staff today  Discussions with Specialists or Other Care Team Provider: nursing, PT/OT, CM, Rx  Education and Discussions with Family / Patient: patient and girlfriend     Current Length of Stay: 0 day(s)    Current Patient Status: Observation   Certification Statement: The patient, admitted on an observation basis, will now require > 2 midnight hospital stay due to generalized weakness     Discharge Plan: hopeful in 24 hours    Code Status: Level 1 - Full Code    Subjective:   Feeling slightly better but continue to feel weak  No diarrhea overnight but had one after breakfast  Stool studies were sent  Denies any abdominal pain  Objective:     Vitals:   Temp (24hrs), Av 3 °F (36 8 °C), Min:96 8 °F (36 °C), Max:99 5 °F (37 5 °C)    Temp:  [96 8 °F (36 °C)-99 5 °F (37 5 °C)] 98 3 °F (36 8 °C)  HR:  [70-86] 70  Resp:  [15-21] 18  BP: (147-196)/(69-84) 147/69  SpO2:  [97 %-100 %] 97 %  Body mass index is 25 31 kg/m²  Input and Output Summary (last 24 hours):        Intake/Output Summary (Last 24 hours) at 2020 1124  Last data filed at 1/11/2020 0601  Gross per 24 hour   Intake 1491 25 ml   Output 1225 ml   Net 266 25 ml       Physical Exam:     Physical Exam   Constitutional: He is oriented to person, place, and time  He appears well-developed  No distress  Chronically ill appearing and frail      HENT:   Head: Normocephalic and atraumatic  Mouth/Throat: Oropharynx is clear and moist    Eyes: Pupils are equal, round, and reactive to light  Conjunctivae and EOM are normal    Neck: Normal range of motion  Neck supple  No thyromegaly present  Cardiovascular: Normal rate, regular rhythm, normal heart sounds and intact distal pulses  Pulmonary/Chest: Effort normal and breath sounds normal  No respiratory distress  He has no wheezes  Abdominal: Soft  Bowel sounds are normal  He exhibits no distension  There is no tenderness  RUQ cholecystostomy tube in place    Musculoskeletal: Normal range of motion  He exhibits no edema or deformity  Neurological: He is alert and oriented to person, place, and time  He has normal reflexes  No cranial nerve deficit  Skin: Skin is warm and dry  No erythema  Psychiatric: He has a normal mood and affect  His behavior is normal  Thought content normal    Vitals reviewed  Additional Data:     Labs:    Results from last 7 days   Lab Units 01/11/20   WBC Thousand/uL 6 00   HEMOGLOBIN g/dL 9 6*   HEMATOCRIT % 29 7*   PLATELETS Thousands/uL 148*   NEUTROS PCT % 64   LYMPHS PCT % 22   MONOS PCT % 11   EOS PCT % 2     Results from last 7 days   Lab Units 01/11/20   POTASSIUM mmol/L 3 4*   CHLORIDE mmol/L 111*   CO2 mmol/L 24   BUN mg/dL 17   CREATININE mg/dL 2 00*   CALCIUM mg/dL 8 0*   ALK PHOS U/L 93   ALT U/L 10   AST U/L 10*         Results from last 7 days   Lab Units 01/11/20  0629 01/10/20  2000   POC GLUCOSE mg/dl 116 133           * I Have Reviewed All Lab Data Listed Above  * Additional Pertinent Lab Tests Reviewed:  Laura 66 Admission  Reviewed    Imaging:  Imaging Reports Reviewed Today Include: n/a     Recent Cultures (last 7 days):           Last 24 Hours Medication List:     Current Facility-Administered Medications:  acetaminophen 650 mg Oral Q6H PRN RIKA Dugan    amLODIPine 10 mg Oral Daily RIKA Dugan    aspirin 81 mg Oral Daily RIKA Dugan    b complex-vitamin C-folic acid 1 capsule Oral Daily With Dinner RIKA Dugan    cholecalciferol 1,000 Units Oral Daily RIKA Dugan    colchicine 0 6 mg Oral Daily RIKA Dugan    heparin (porcine) 5,000 Units Subcutaneous [facility] RIKA Dugan    hydrALAZINE 10 mg Intravenous Q6H PRN RIKA Dugan    insulin detemir 10 Units Subcutaneous HS RIKA Dugan    insulin lispro 1-6 Units Subcutaneous TID AC Kayce Hanley, RIKA    insulin lispro 1-6 Units Subcutaneous HS RIKA Dugan    magnesium sulfate 2 g Intravenous Once RIKA Dugan    methenamine hippurate 1 g Oral TID RIKA Dugan    neomycin-bacitracin-polymyxin b 1 small application Topical Daily RIKA Dugan    oxyCODONE-acetaminophen 1 tablet Oral Q4H PRN RIKA Dugan    pantoprazole 40 mg Oral Early Morning RIKA Dugan    saccharomyces boulardii 250 mg Oral BID RIKA Dugan    simethicone 80 mg Oral Q6H PRN RIKA Dugan    sodium bicarbonate 650 mg Oral BID after meals RIKA Dugan    sodium chloride 75 mL/hr Intravenous Continuous RIKA Dugan Last Rate: 75 mL/hr (01/11/20 1004)   tamsulosin 0 4 mg Oral Daily With Dinner RIKA Dugan         Today, Patient Was Seen By: RIKA Dugan    ** Please Note: Dictation voice to text software may have been used in the creation of this document   **

## 2020-01-11 NOTE — PHYSICAL THERAPY NOTE
Physical Therapy Evaluation     Patient's Name: Junior Canas    Admitting Diagnosis  Diarrhea [R19 7]  Dehydration [E86 0]    Problem List  Patient Active Problem List   Diagnosis    Weakness generalized    Type 2 diabetes 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, with long-term current use of insulin ([facility]    Essential hypertension    Mixed hyperlipidemia    Cardiac disease    Hydroureter    Metabolic acidosis    Iron deficiency anemia secondary to inadequate dietary iron intake    Accelerated hypertension    Liver mass    Neuroendocrine tumor    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)    Pressure injury of right heel, unstageable ([address] )    Atherosclerosis of artery of extremity with ulceration (HCC)    Carcinoid syndrome (HCC)    Chronic indwelling Haile catheter    Moderate protein-calorie malnutrition ([address] )    Biliary sludge    Malignant neuroendocrine neoplasm ([facility] )    Urinary retention    Cholecystostomy tube dysfunction    Acute pancreatitis without infection or necrosis    UTI due to extended-spectrum beta lactamase (ESBL) producing Escherichia coli    CKD (chronic kidney disease) stage 3, GFR 30-59 ml/min (HCC)    Anemia    Acute acalculous cholecystitis    Shortness of breath    Goals of care, counseling/discussion       Past Medical History  Past Medical History:   Diagnosis Date    Acute pancreatitis without infection or necrosis 9/26/2019    Anemia of chronic renal failure     BPH (benign prostatic hyperplasia)     Cancer (HCC)     liver cancer    Cardiac disease     Chronic kidney disease, stage 3 ([address] )     Coronary artery disease     Diabetes mellitus ([address] )     DJD (degenerative joint disease)     Essential hypertension     Gait disturbance     Generalized weakness     GERD (gastroesophageal reflux disease)     Gout     History of transfusion     Hydronephrosis     Hyperlipidemia     Hypertension     Liver cancer (HCC)     Pressure injury of skin     Proteinuria     Renal disorder     Retention of urine     Thrombophlebitis migrans     Type 2 diabetes mellitus (HCC)     Type 2 diabetes 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, with long-term current use of insulin ([facility] ) 1/23/2019       Past Surgical History  Past Surgical History:   Procedure Laterality Date    CORONARY ANGIOPLASTY WITH STENT PLACEMENT      IR IMAGE GUIDED BIOPSY/ASPIRATION LIVER  1/24/2019    IR TUBE PLACEMENT ROQUE  9/6/2019 01/11/20 0942   Note Type   Note type Eval only   Pain Assessment   Pain Assessment No/denies pain   Home Living   Type of [address] One level;Stairs to enter with rails  (2 BETH)   Bathroom Shower/Tub Walk-in shower   Bathroom Toilet Raised   Bathroom Equipment Grab bars in shower; Toilet raiser;Commode; Shower chair   Home Equipment Walker;Grab bars   Prior Function   Level of [place] Independent with ADLs and functional mobility   Lives With Significant other   Receives Help From Family   ADL Assistance Independent   IADLs Needs assistance   Falls in the last 6 months 0   Vocational Retired   Comments pt agreeable to PT eval   Restrictions/Precautions   [facility] B[facility] Precautions Per Order No   Other Precautions Contact/isolation; Bed Alarm;Multiple lines; Fall Risk  (urinary cathetar)   General   Family/Caregiver Present Yes   Cognition   Overall Cognitive Status WFL   Arousal/Participation Alert   Orientation Level Oriented X4   Memory Within functional limits   Following Commands Follows all commands and directions without difficulty   RLE Assessment   RLE Assessment X   Strength RLE   RLE Overall Strength 4-/5   LLE Assessment   LLE Assessment X   Strength LLE   LLE Overall Strength 4-/5   Bed Mobility   Sit to Supine 4  Minimal assistance   Additional items Assist x 1   Transfers   Sit to Stand 4  Minimal assistance   Additional items Assist x 1;Bedrails;Verbal cues   Stand to Sit 4  Minimal assistance Additional items Assist x 1;Verbal cues; Bedrails   Toilet transfer 4  Minimal assistance   Additional items Assist x 1; Armrests;Commode;Verbal cues   Ambulation/Elevation   Gait pattern Decreased foot clearance; Improper Weight shift   Gait Assistance 4  Minimal assist   Additional items Assist x 1;Verbal cues   Assistive Device Rolling walker   Distance 20 ft with 2 turns   Balance   Static Sitting Normal   Dynamic Sitting Good   Static Standing Fair   Dynamic Standing Fair -   Ambulatory Fair -   Endurance Deficit   Endurance Deficit Yes   Endurance Deficit Description c/o fatigue   Activity Tolerance   Activity Tolerance Patient limited by fatigue   Assessment   Prognosis Fair   Problem List Decreased strength;Decreased endurance; Impaired balance;Decreased mobility   Assessment Pt is [de-identified] y o  male seen for PT evaluation s/p admit to [address] on 1/10/2020 w/ Weakness generalized  PT consulted to assess pt's functional mobility and d/c needs  Order placed for PT eval and tx, w/ up as tolerated order  Comorbidities affecting pt's physical performance at time of assessment include: acute pancreatitis, DM, and DJD  PTA, pt was independent w/ all functional mobility w/ RW, has 2 BETH, lives w/ girlfriend in 1 level house and unemployed  Personal factors affecting pt at time of IE include: ambulating w/ assistive device, stairs to enter home and inability to navigate community distances  Please find objective findings from PT assessment regarding body systems outlined above with impairments and limitations including weakness, impaired balance, decreased endurance, gait deviations, decreased activity tolerance, decreased functional mobility tolerance and fall risk  The following objective measures performed on IE also reveal limitations: Barthel Index: 45/100  Pt's clinical presentation is currently unstable/unpredictable seen in pt's presentation of loose stools, multiple lines, and abnormal labs   Pt to benefit from continued PT tx to address deficits as defined above and maximize level of functional independent mobility and consistency  From PT/mobility standpoint, recommendation at time of d/c would be STR pending progress in order to facilitate return to PLOF  Barriers to Discharge Inaccessible home environment   Goals   Patient Goals to get better   LTG Expiration Date 01/18/20   Long Term Goal #1 Pt will: Perform bed mobility tasks to Supervision to improve ease of bed mobility  Perform transfers to Supervision to improve ease of transfers  Perform ambulation with supervision and RW for 125 ft to   increase Indep in home environment  Increase dynamic standing balance to F+ to decrease fall risk  Increase BLE strength to 4+/5 to improve functional mobility  Increase OOB activity tolerance to 10 minutes without s/s of exertion to decrease fall risk  Navigate up and down 2 steps so patient can enter and exit home  PT Treatment Day 0   Plan   Treatment/Interventions Functional transfer training;LE strengthening/ROM; Elevations; Therapeutic exercise; Endurance training;Bed mobility;Gait training   PT Frequency Other (Comment)  (3-5x/wk)   Recommendation   Recommendation Short-term skilled PT   Equipment Recommended Walker   PT - OK to Discharge Yes  (to STR)   Barthel Index   Feeding 10   Bathing 0   Grooming Score 5   Dressing Score 5   Bladder Score 0   Bowels Score 10   Toilet Use Score 5   Transfers (Bed/Chair) Score 10   Mobility (Level Surface) Score 0   Stairs Score 0   Barthel Index Score 45           Sana Esteves, PT

## 2020-01-11 NOTE — ASSESSMENT & PLAN NOTE
· Monitor BP closely   · Slightly elevated on admission- much improved currently   · Will make dose adjustment as needed  · Will continue with amlodipine

## 2020-01-11 NOTE — OCCUPATIONAL THERAPY NOTE
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)    Pressure injury of right heel, unstageable (HCC)    Moderate protein-calorie malnutrition (HCC)    Biliary sludge    Urinary retention    CKD (chronic kidney disease) stage 3, GFR 30-59 ml/min (HCC)      Past Medical History:   Diagnosis Date    Acute pancreatitis without infection or necrosis 9/26/2019    Anemia of chronic renal failure     BPH (benign prostatic hyperplasia)     Cancer (HCC)     liver cancer    Cardiac disease     Chronic [facility] disease, [facility] 3 ([facility] )     Coronary artery disease     Diabetes mellitus [facility]     DJD (degenerative joint disease)     Essential hypertension     Gait disturbance     Generalized weakness     GERD (gastroesophageal reflux disease)     Gout     History of transfusion     Hydronephrosis     Hyperlipidemia     Hypertension     Liver cancer ([facility]     Pressure injury of skin     Proteinuria     Renal disorder     Retention of urine     Thrombophlebitis migrans     Type 2 diabetes mellitus ([facility]     Type 2 diabetes 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, with long-term current use of insulin ([facility] ) 1/23/2019       Past Surgical History:   Procedure Laterality Date    CORONARY ANGIOPLASTY WITH STENT PLACEMENT      IR IMAGE GUIDED BIOPSY/ASPIRATION LIVER  1/24/2019    IR TUBE PLACEMENT ROQUE  9/6/2019 01/11/20 0943   Note Type   Note type Eval only   Restrictions/Precautions   Weight Bearing Precautions Per Order No   Other Precautions Contact/isolation; Bed Alarm;Multiple lines; Fall Risk  (urinary cathetar)   Pain Assessment   Pain Assessment No/denies pain   Pain Score No Pain   Home Living   Type of [address] [place] [address] One level;Stairs to enter with rails  (2 BETH)   Bathroom Shower/Tub Walk-in shower   Bathroom Toilet Raised   Bathroom Equipment Grab bars in shower; Toilet raiser;Commode; Shower chair   Home Equipment Walker;Grab bars   Prior Function   Level of [place] Independent with ADLs and functional mobility   Lives With Significant other   Receives Help From Family   ADL Assistance Independent   IADLs Needs assistance   Falls in the last 6 months 0   Vocational Retired   Comments pt agreeable to PT eval   ADL   UB Bathing Assistance 5  Supervision/Setup   LB Bathing Assistance 4  Minimal Assistance   UB Dressing Assistance 5  Supervision/Setup   LB Dressing Assistance 3  Moderate Assistance   Bed Mobility   Sit to Supine 4  Minimal assistance   Additional items Assist x 1   Transfers   Sit to Stand 4  Minimal assistance   Additional items Assist x 1;Bedrails;Verbal cues   Stand to Sit 4  Minimal assistance   Additional items Assist x 1;Verbal cues; Bedrails   Toilet transfer 4  Minimal assistance   Additional items Assist x 1; Armrests;Commode;Verbal cues   Balance   Static Sitting Normal   Dynamic Sitting Good   Static Standing Fair   Dynamic Standing Fair -   Ambulatory Fair -   Activity Tolerance   Activity Tolerance Patient limited by fatigue   RUE Assessment   RUE Assessment WFL   LUE Assessment   LUE Assessment WFL   Hand Function   Gross Motor Coordination Functional   Fine Motor Coordination Functional   Cognition   Overall Cognitive Status WFL   Arousal/Participation Alert; Cooperative   Attention Within functional limits   Orientation Level Oriented X4   Memory Within functional limits   Following Commands Follows all commands and directions without difficulty   Assessment   Limitation Decreased ADL status; Decreased Safe judgement during ADL;Decreased endurance;Decreased self-care trans;Decreased high-level ADLs   Prognosis Good   Assessment Pt is a [de-identified] y o  male seen for OT evaluation s/p admit to [facility] on 1/10/2020 w/ Weakness generalized  Comorbidities affecting pt's functional performance at time of assessment include: Anemia, CA, CKD, CAD, DM, DJD, HTN, Weakness, Hyperlipidemia, HTN  Personal factors affecting pt at time of IE include:steps to enter environment and difficulty performing ADLS  Prior to admission, pt was Mod I with ADLs  Upon evaluation: the following deficits impact occupational performance: decreased balance and decreased tolerance  Pt to benefit from continued skilled OT tx while in the hospital to address deficits as defined above and maximize level of functional independence w ADL's and functional mobility  Occupational Performance areas to address include: bathing/shower, toilet hygiene, dressing, functional mobility and clothing management  From OT standpoint, recommendation at time of d/c would be STR  Goals   Patient Goals to get better   Plan   Treatment Interventions ADL retraining;Functional transfer training; Endurance training;Neuromuscular reeducation; Energy conservation   Goal Expiration Date 01/21/20   OT Treatment Day 0   OT Frequency 3-5x/wk   Recommendation   OT Discharge Recommendation Short Term Rehab   OT - OK to Discharge Yes  (when medically stable)   Barthel Index   Feeding 10   Bathing 0   Grooming Score 5   Dressing Score 5   Bladder Score 0   Bowels Score 10   Toilet Use Score 5   Transfers (Bed/Chair) Score 10   Mobility (Level Surface) Score 0   Stairs Score 0   Barthel Index Score 45     GOALS:    Pt will achieve the following within specified time frame: STG  Pt will achieve the following goals within 5 days    *ADL transfers with CGA for inc'd independence with ADLs/purposeful tasks    *LB ADL with Min (A) using AE prn for inc'd independence with self cares    *Toileting with Min (A) for clothing management and hygiene for return to PLOF with personal care    *Increase static stand balance to F+ and dyn stand balance to F for inc'd safety with standing purposeful tasks    *Increase stand tolerance x5 m for inc'd tolerance with standing purposeful tasks    *Participate in 10m UE therex to increase overall stamina/activity tolerance for purposeful tasks    *Bed mobility- (S) for inc'd independence to manage own comfort and initiate EOB & OOB purposeful tasks    Pt will achieve the following within specified time frame: LTG  Pt will achieve the following goals within 10 days    *ADL transfers with (S) for inc'd independence with ADLs/purposeful tasks    *UB ADL with (I) for inc'd independence with self cares    *LB ADL with CGA using AE prn for inc'd independence with self cares    *Toileting with CGA for clothing management and hygiene for return to PLOF with personal care    *Increase static stand balance to G- and dyn stand balance to F+ for inc'd safety with standing purposeful tasks    *Increase stand tolerance x7 m for inc'd tolerance with standing purposeful tasks    *Bed mobility- (I) for inc'd independence to manage own comfort and initiate EOB & OOB purposeful tasks      Laila Mckeon MS, OTR/L

## 2020-01-11 NOTE — ASSESSMENT & PLAN NOTE
· With ambulatory gait dysfunction  · Suspected that this increased generalized weakness is due to increasing amount of diarrhea over the past 48 hours  · No diarrhea overnight  · Patient usually has BM with meals; he had one episode of diarrhea after breakfast this AM  · PT/OT- recommends SNF   · Gentle IV fluid  · Supportive care

## 2020-01-12 VITALS
TEMPERATURE: 97.8 F | DIASTOLIC BLOOD PRESSURE: 65 MMHG | OXYGEN SATURATION: 97 % | BODY MASS INDEX: 25.23 KG/M2 | RESPIRATION RATE: 18 BRPM | WEIGHT: 166.45 LBS | HEIGHT: 68 IN | SYSTOLIC BLOOD PRESSURE: 131 MMHG | HEART RATE: 74 BPM

## 2020-01-12 LAB
ANION GAP SERPL CALCULATED.3IONS-SCNC: 7 MMOL/L (ref 4–13)
BACTERIA UR CULT: ABNORMAL
BUN SERPL-MCNC: 23 MG/DL (ref 7–25)
C DIFF TOX GENS STL QL NAA+PROBE: NEGATIVE
CALCIUM SERPL-MCNC: 8.1 MG/DL (ref 8.6–10.5)
CAMPYLOBACTER DNA SPEC NAA+PROBE: NORMAL
CHLORIDE SERPL-SCNC: 111 MMOL/L (ref 98–107)
CO2 SERPL-SCNC: 24 MMOL/L (ref 21–31)
CREAT SERPL-MCNC: 2.02 MG/DL (ref 0.7–1.3)
ERYTHROCYTE [DISTWIDTH] IN BLOOD BY AUTOMATED COUNT: 15.8 % (ref 11.5–14.5)
GFR SERPL CREATININE-BSD FRML MDRD: 30 ML/MIN/1.73SQ M
GLUCOSE SERPL-MCNC: 156 MG/DL (ref 65–140)
GLUCOSE SERPL-MCNC: 191 MG/DL (ref 65–140)
GLUCOSE SERPL-MCNC: 50 MG/DL (ref 65–140)
GLUCOSE SERPL-MCNC: 52 MG/DL (ref 65–140)
GLUCOSE SERPL-MCNC: 53 MG/DL (ref 65–99)
GLUCOSE SERPL-MCNC: 69 MG/DL (ref 65–140)
HCT VFR BLD AUTO: 29 % (ref 42–47)
HGB BLD-MCNC: 9.6 G/DL (ref 14–18)
MAGNESIUM SERPL-MCNC: 1.8 MG/DL (ref 1.9–2.7)
MCH RBC QN AUTO: 29.9 PG (ref 26–34)
MCHC RBC AUTO-ENTMCNC: 33.2 G/DL (ref 31–37)
MCV RBC AUTO: 90 FL (ref 81–99)
PLATELET # BLD AUTO: 151 THOUSANDS/UL (ref 149–390)
PMV BLD AUTO: 8.8 FL (ref 8.6–11.7)
POTASSIUM SERPL-SCNC: 4.1 MMOL/L (ref 3.5–5.5)
RBC # BLD AUTO: 3.22 MILLION/UL (ref 4.3–5.9)
SALMONELLA DNA SPEC QL NAA+PROBE: NORMAL
SHIGA TOXIN STX GENE SPEC NAA+PROBE: NORMAL
SHIGELLA DNA SPEC QL NAA+PROBE: NORMAL
SODIUM SERPL-SCNC: 142 MMOL/L (ref 134–143)
WBC # BLD AUTO: 6.8 THOUSAND/UL (ref 4.8–10.8)

## 2020-01-12 PROCEDURE — 83735 ASSAY OF MAGNESIUM: CPT | Performed by: NURSE PRACTITIONER

## 2020-01-12 PROCEDURE — 99239 HOSP IP/OBS DSCHRG MGMT >30: CPT | Performed by: NURSE PRACTITIONER

## 2020-01-12 PROCEDURE — 82948 REAGENT STRIP/BLOOD GLUCOSE: CPT

## 2020-01-12 PROCEDURE — 85027 COMPLETE CBC AUTOMATED: CPT | Performed by: NURSE PRACTITIONER

## 2020-01-12 PROCEDURE — 80048 BASIC METABOLIC PNL TOTAL CA: CPT | Performed by: NURSE PRACTITIONER

## 2020-01-12 RX ORDER — SACCHAROMYCES BOULARDII 250 MG
250 CAPSULE ORAL 2 TIMES DAILY
Qty: 10 CAPSULE | Refills: 0 | Status: SHIPPED | OUTPATIENT
Start: 2020-01-12 | End: 2020-02-20 | Stop reason: HOSPADM

## 2020-01-12 RX ORDER — SACCHAROMYCES BOULARDII 250 MG
250 CAPSULE ORAL 2 TIMES DAILY
Qty: 10 CAPSULE | Refills: 0 | Status: SHIPPED | OUTPATIENT
Start: 2020-01-12 | End: 2020-01-12

## 2020-01-12 RX ORDER — LOPERAMIDE HYDROCHLORIDE 2 MG/1
2 CAPSULE ORAL EVERY 4 HOURS PRN
Status: DISCONTINUED | OUTPATIENT
Start: 2020-01-12 | End: 2020-01-12 | Stop reason: HOSPADM

## 2020-01-12 RX ADMIN — ASPIRIN 81 MG 81 MG: 81 TABLET ORAL at 09:15

## 2020-01-12 RX ADMIN — INSULIN LISPRO 2 UNITS: 100 INJECTION, SOLUTION INTRAVENOUS; SUBCUTANEOUS at 11:51

## 2020-01-12 RX ADMIN — AMLODIPINE BESYLATE 10 MG: 5 TABLET ORAL at 09:15

## 2020-01-12 RX ADMIN — METHENAMINE HIPPURATE 1 G: 1 TABLET ORAL at 09:15

## 2020-01-12 RX ADMIN — SODIUM CHLORIDE 75 ML/HR: 9 INJECTION, SOLUTION INTRAVENOUS at 02:03

## 2020-01-12 RX ADMIN — HEPARIN SODIUM 5000 UNITS: 5000 INJECTION, SOLUTION INTRAVENOUS; SUBCUTANEOUS at 05:16

## 2020-01-12 RX ADMIN — VITAMIN D, TAB 1000IU (100/BT) 1000 UNITS: 25 TAB at 09:15

## 2020-01-12 RX ADMIN — SODIUM BICARBONATE 650 MG: 650 TABLET ORAL at 09:15

## 2020-01-12 RX ADMIN — Medication 250 MG: at 09:15

## 2020-01-12 RX ADMIN — COLCHICINE 0.6 MG: 0.6 TABLET, FILM COATED ORAL at 09:15

## 2020-01-12 RX ADMIN — LOPERAMIDE HYDROCHLORIDE 2 MG: 2 CAPSULE ORAL at 11:51

## 2020-01-12 RX ADMIN — BACITRACIN ZINC, NEOMYCIN SULFATE, AND POLYMYXIN B SULFATE 1 SMALL APPLICATION: 400; 3.5; 5 OINTMENT TOPICAL at 09:15

## 2020-01-12 RX ADMIN — PANTOPRAZOLE SODIUM 40 MG: 40 TABLET, DELAYED RELEASE ORAL at 05:16

## 2020-01-12 NOTE — ASSESSMENT & PLAN NOTE
· With ambulatory gait dysfunction  · Suspected that this increased generalized weakness is due to increasing amount of diarrhea over the past 48 hours  · Patient usually has BM with meals; he had one episode of diarrhea after breakfast yesterday AM   · C diff- negative   · Stool culture- negative   · PT/OT- recommends SNF; family would like to bring patient home with home PT   · Supportive care  · Able to tolerate diet- clinically is much improved

## 2020-01-12 NOTE — ASSESSMENT & PLAN NOTE
· Follow up with Dr Juan Daniel Back outpatient   · Not a candidate for the [facility] treatment   · Currently on Lanreotide injections 120 mg every 4 weeks- next dose 1/14/2020  · Patient was recently seen on 1/7/20 and at that time symptoms appears to be controlling   · Increasing diarrhea from 3 times a day to 6-7x over the past 2 days prior to admission   · Suspected that diarrhea is due to abx use   patient just finished a course of Augmentin (total of 7 days)  · Infectious cause is ruled out  · Supportive care   · Low residue/low fiber diet

## 2020-01-12 NOTE — ASSESSMENT & PLAN NOTE
Lab Results   Component Value Date    HGBA1C 8 1 (H) 06/14/2019       Recent Labs     01/12/20  0625 01/12/20  0627 01/12/20  0641 01/12/20  0710   POCGLU 50* 52* 69 156*       Blood Sugar Average: Last 72 hrs:  · (P) 149 [id number]   · continue will continue with Lantus at nighttime  · Noted hypoglycemia this AM- this is resolved   · Can resume home regimen on discharge

## 2020-01-12 NOTE — DISCHARGE SUMMARY
Discharge- Corin Greenwood 1940, [de-identified] y o  male MRN: [id number]    Unit/Bed#: -01 Encounter: [id number]    Primary Care Provider: DO MANASA   Date and time admitted to hospital: 1/10/2020  2:40 PM        * Weakness generalized  Assessment & Plan  · With ambulatory gait dysfunction  · Suspected that this increased generalized weakness is due to increasing amount of diarrhea over the past 48 hours  · Patient usually has BM with meals; he had one episode of diarrhea after breakfast yesterday AM   · C diff- negative   · Stool culture- negative   · PT/OT- recommends SNF; family would like to bring patient home with home PT   · Supportive care  · Able to tolerate diet- clinically is much improved     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 [facility])  Assessment & Plan  · Follow up with Dr Vidal Powell outpatient   · Not a candidate for the [facility] treatment   · Currently on Lanreotide injections 120 mg every 4 weeks- next dose 1/14/2020  · Patient was recently seen on 1/7/20 and at that time symptoms appears to be controlling   · Increasing diarrhea from 3 times a day to 6-7x over the past 2 days prior to admission   · Suspected that diarrhea is due to abx use   patient just finished a course of Augmentin (total of 7 days)  · Infectious cause is ruled out  · Supportive care   · Low residue/low fiber diet     CKD (chronic kidney disease) stage 3, GFR 30-59 ml/min ([facility])  Assessment & Plan  · With baseline creatinine between 2 2-2 5 mg/dL   · Currently at baseline   · Will monitor closely   · Avoid any nephrotoxins      Urinary retention  Assessment & Plan  · With chronic garcia catheter and frequent UTIs  · Will continue with flomax and hiprex  · No urinary symptoms, afebrile        Biliary sludge  Assessment & Plan  · S/p percutaneous cholecystostomy tube   · Last IR tube check/exchange done on 12/10/2019      Moderate protein-calorie malnutrition ([address] )  Assessment & Plan  Malnutrition Findings:           BMI Findings: Body mass index is 25 31 kg/m²  · Dietary supplement       Pressure injury of right heel, unstageable (HCC)  Assessment & Plan  · POA  · Continue local wound care   · Frequent turn and reposition      Essential hypertension  Assessment & Plan  · Monitor BP closely   · Slightly elevated on admission- much improved currently   · Will make dose adjustment as needed  · Will continue with amlodipine       Type 2 diabetes 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, with long-term current use of insulin [facility])  Assessment & Plan  Lab Results   Component Value Date    HGBA1C 8 1 (H) 06/14/2019       Recent Labs     01/12/20  0625 01/12/20  0627 01/12/20  0641 01/12/20  0710   POCGLU 50* 52* 69 156*       Blood Sugar Average: Last 72 hrs:  · (P) 149 [id number]   · continue will continue with Lantus at nighttime  · Noted hypoglycemia this AM- this is resolved   · Can resume home regimen on discharge             Discharging Physician / Practitioner: Lilly Merritt  PCP: Charlotte Gonzales DO  Admission Date:   Admission Orders (From admission, onward)     Ordered        01/11/20 1621  Inpatient Admission  Once         01/10/20 1835  Place in Observation (expected length of stay for this patient is less than two midnights)  Once                   Discharge Date: 01/12/20    Resolved Problems  Date Reviewed: 1/12/2020    None          Consultations During Hospital Stay:  · None     Procedures Performed:   · None     Significant Findings / Test Results:   · CT chest Lungs appear clear   No evidence for pneumonia      Ascending aortic aneurysmal dilatation as above   Heavy calcified coronary artery disease   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    Incidental Findings:   · None      Test Results Pending at Discharge (will require follow up):   · Ova and parasite- pending      Outpatient Tests Requested:  · Follow up with hematology   · Follow up with PCP     Complications:     None     Reason for Admission:  Generalized weakness and increasing diarrhea    Hospital Course:     Sameer Stevenson is a [de-identified] y o  male patient who originally presented to the hospital on 1/10/2020 due to generalized weakness and increasing diarrhea  Please refer to H&P for initial presenting complaint  In brief the patient was admitted with increasing generalized weakness likely due to increasing diarrhea  It is suspected that his worsening diarrhea is due to course of antibiotic that the patient was taking as an outpatient  His stool culture so far shows no infectious cause  The patient has been afebrile without leukocytosis  He received IV fluid  Diarrhea is much improved  Patient is currently at his baseline of diarrhea/loose stools 2 to 3 times a day with meals  The patient was started on probiotics  He was evaluated by PT and OT and recommended short-term rehab  Both patient and family refused this option and would like to return home  I recommend that he stays on low residue diet for the next few days  Please see above list of diagnoses and related plan for additional information  Condition at Discharge: fair     Discharge Day Visit / Exam:     Subjective:  Feeling slightly better  Not as weak as when he came in  No BM/diarrhea since yesterday after breakfast  No abdominal pain, n/v    Vitals: Blood Pressure: 131/65 (01/12/20 0718)  Pulse: 74 (01/12/20 0718)  Temperature: 97 8 °F (36 6 °C) (01/12/20 0718)  Temp Source: Temporal (01/12/20 0718)  Respirations: 18 (01/12/20 0718)  Height: 5' 8" (172 7 cm) (01/10/20 1900)  Weight - Scale: 75 5 kg (166 lb 7 2 oz) (01/10/20 1900)  SpO2: 97 % (01/12/20 0718)  Exam:   Physical Exam   Constitutional: He is oriented to person, place, and time  He appears well-developed  No distress  Frail and chronically ill appearing    HENT:   Head: Normocephalic and atraumatic     Mouth/Throat: Oropharynx is clear and moist    Eyes: Pupils are equal, round, and reactive to light  Conjunctivae and EOM are normal    Neck: Normal range of motion  Neck supple  No thyromegaly present  Cardiovascular: Normal rate, regular rhythm, normal heart sounds and intact distal pulses  Pulmonary/Chest: Effort normal and breath sounds normal  No respiratory distress  He has no wheezes  Abdominal: Soft  Bowel sounds are normal  He exhibits no distension  There is no tenderness  Right cholecystostomy tube with greenish/bile    Musculoskeletal: Normal range of motion  He exhibits no edema or deformity  Neurological: He is alert and oriented to person, place, and time  He has normal reflexes  No cranial nerve deficit  Skin: Skin is warm and dry  No erythema  Psychiatric: He has a normal mood and affect  His behavior is normal  Thought content normal    Vitals reviewed  Discussion with Family: girlfriend     Discharge instructions/Information to patient and family:   See after visit summary for information provided to patient and family  Provisions for Follow-Up Care:  See after visit summary for information related to follow-up care and any pertinent home health orders  Disposition:     Home with VNA Services (Reminder: Complete face to face encounter)    For Discharges to [facility] SNF:   · Not Applicable to this Patient - Not Applicable to this Patient    Planned Readmission:    No      Discharge Statement:  I spent 36 minutes discharging the patient  This time was spent on the day of discharge  I had direct contact with the patient on the day of discharge  Greater than 50% of the total time was spent examining patient, answering all patient questions, arranging and discussing plan of care with patient as well as directly providing post-discharge instructions  Additional time then spent on discharge activities      Discharge Medications:  See after visit summary for reconciled discharge medications provided to patient and family        ** Please Note: This note has been constructed using a voice recognition system **

## 2020-01-13 LAB
ATRIAL RATE: 83 BPM
P AXIS: 81 DEGREES
PR INTERVAL: 162 MS
QRS AXIS: -57 DEGREES
QRSD INTERVAL: 106 MS
QT INTERVAL: 402 MS
QTC INTERVAL: 472 MS
T WAVE AXIS: 88 DEGREES
VENTRICULAR RATE: 83 BPM

## 2020-01-13 PROCEDURE — 93010 ELECTROCARDIOGRAM REPORT: CPT | Performed by: INTERNAL MEDICINE

## 2020-01-14 ENCOUNTER — HOSPITAL ENCOUNTER (OUTPATIENT)
Dept: INFUSION CENTER | Facility: HOSPITAL | Age: 80
Discharge: HOME | End: 2020-01-14
Payer: COMMERCIAL

## 2020-01-14 VITALS
RESPIRATION RATE: 18 BRPM | TEMPERATURE: 97.3 F | HEART RATE: 82 BPM | OXYGEN SATURATION: 100 % | DIASTOLIC BLOOD PRESSURE: 65 MMHG | SYSTOLIC BLOOD PRESSURE: 140 MMHG

## 2020-01-14 DIAGNOSIS — E34.0 CARCINOID SYNDROME (HCC): Primary | ICD-10-CM

## 2020-01-14 DIAGNOSIS — D3A.8 NEUROENDOCRINE TUMOR: ICD-10-CM

## 2020-01-14 DIAGNOSIS — C7A.8 NEUROENDOCRINE CARCINOMA METASTATIC TO LIVER (HCC): ICD-10-CM

## 2020-01-14 DIAGNOSIS — C7A.8 MALIGNANT NEUROENDOCRINE NEOPLASM (HCC): ICD-10-CM

## 2020-01-14 DIAGNOSIS — C7B.8 NEUROENDOCRINE CARCINOMA METASTATIC TO LIVER (HCC): ICD-10-CM

## 2020-01-14 PROCEDURE — 96372 THER/PROPH/DIAG INJ SC/IM: CPT

## 2020-01-14 RX ORDER — LANREOTIDE ACETATE 120 MG/.5ML
120 INJECTION SUBCUTANEOUS ONCE
Status: CANCELLED | OUTPATIENT
Start: 2020-02-11

## 2020-01-14 RX ORDER — LANREOTIDE ACETATE 120 MG/.5ML
120 INJECTION SUBCUTANEOUS ONCE
Status: COMPLETED | OUTPATIENT
Start: 2020-01-14 | End: 2020-01-14

## 2020-01-14 RX ADMIN — LANREOTIDE ACETATE 120 MG: 120 INJECTION SUBCUTANEOUS at 13:10

## 2020-01-14 NOTE — PLAN OF CARE
Problem: Potential for Falls  Goal: Patient will remain free of falls  Description  INTERVENTIONS:  - Assess patient frequently for physical needs  -  Identify cognitive and physical deficits and behaviors that affect risk of falls    -  [place] fall precautions as indicated by assessment   - Educate patient/family on patient safety including physical limitations  - Instruct patient to call for assistance with activity based on assessment  - Modify environment to reduce risk of injury  - Consider OT/PT consult to assist with strengthening/mobility  Outcome: Progressing

## 2020-01-15 LAB — O+P STL CONC: NORMAL

## 2020-01-16 ENCOUNTER — OFFICE VISIT (OUTPATIENT)
Dept: NEPHROLOGY | Facility: CLINIC | Age: 80
End: 2020-01-16
Payer: COMMERCIAL

## 2020-01-16 VITALS
BODY MASS INDEX: 23.64 KG/M2 | HEART RATE: 78 BPM | WEIGHT: 156 LBS | SYSTOLIC BLOOD PRESSURE: 116 MMHG | OXYGEN SATURATION: 99 % | HEIGHT: 68 IN | DIASTOLIC BLOOD PRESSURE: 62 MMHG

## 2020-01-16 DIAGNOSIS — M1A.3620 CHRONIC GOUT OF LEFT KNEE DUE TO RENAL IMPAIRMENT WITHOUT TOPHUS: ICD-10-CM

## 2020-01-16 DIAGNOSIS — N25.81 SECONDARY HYPERPARATHYROIDISM OF RENAL ORIGIN (HCC): ICD-10-CM

## 2020-01-16 DIAGNOSIS — D63.1 ANEMIA DUE TO STAGE 4 CHRONIC KIDNEY DISEASE (HCC): ICD-10-CM

## 2020-01-16 DIAGNOSIS — C7A.8 NEUROENDOCRINE CARCINOMA METASTATIC TO LIVER (HCC): Primary | ICD-10-CM

## 2020-01-16 DIAGNOSIS — N18.4 CKD (CHRONIC KIDNEY DISEASE) STAGE 4, GFR 15-29 ML/MIN (HCC): ICD-10-CM

## 2020-01-16 DIAGNOSIS — N18.4 ANEMIA DUE TO STAGE 4 CHRONIC KIDNEY DISEASE (HCC): ICD-10-CM

## 2020-01-16 DIAGNOSIS — C7B.8 NEUROENDOCRINE CARCINOMA METASTATIC TO LIVER (HCC): Primary | ICD-10-CM

## 2020-01-16 PROCEDURE — 99214 OFFICE O/P EST MOD 30 MIN: CPT | Performed by: NURSE PRACTITIONER

## 2020-01-16 RX ORDER — COLCHICINE 0.6 MG/1
0.6 TABLET ORAL DAILY
Qty: 90 TABLET | Refills: 3 | Status: SHIPPED | OUTPATIENT
Start: 2020-01-16 | End: 2020-02-20 | Stop reason: HOSPADM

## 2020-01-16 NOTE — PROGRESS NOTES
Nephrology   [address] [facility] [de-identified] y o  male MRN: [id number]    Encounter: [id number]      Assessment & Plan    1  Chronic Kidney Disease 4  · Upon review of the medical record, baseline creatinine appears to be 1 9-2 4 mg/dL  · Most recent creatinine 1/12/20 2 02 mg/dL, within known baseline  · Primary Nephrologist: Dr Irasema Patel, last visit 9/25/19  -     CBC and differential; Future  -     Comprehensive metabolic panel; Future  -     Microalbumin / creatinine urine ratio; Future  -     Phosphorus; Future  -     Uric acid; Future  -     Urinalysis with microscopic; Future  -     Vitamin D 1,25 dihydroxy; Future  2  Metabolic Acidosis  · Stable at this time  · Sodium bicarb supplement 650 mg BID, continue  3  Urinary retention with chronic Indwelling urinary catheter  · Patient and draining appropriately  · Continue Methanamine hippurate and flomax  · Continue flomax  4  Hypertension associated with CKD 3  · Regimen: amlodipine 10 mg daily  · BP today: 116/62  · Monitor for hypotension  5  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  · Lanreotide injections 120 mg every 4 weeks, last dose 1/14/20  · Managed by heme/onc, last visit 1/7/20  6  Proteinuria  · Suspected diabetic nephropathy  · Follow  7  Diarrhea  · Suspect second to Augmentin use per hospitalist note  · Admitted 1/10-1/12, has since resolved  · On probiotic and is to continue if diarrhea should restart   8  Biliary sludge  · + percutaneous cholescystostomy tube  · Last tube change done in IR 12/10/19  · S/o does dressing changes  Appears clean, dry, intact  9  Protein Calorie Malnutrition  · Encourage protein intake, small frequent meals  · Uses ensure plus and appetite has been increasing  10  Diabetes Mellitus 2 associated with CKD 4  · Most recent a1c 8 1%  · Needs good blood sugar control      HPI: Obinna Jordan is a [de-identified] y o  male who is here for hospital follow-up   He recently had a hospital stay at [place] [address] [facility] on 1/10-1/12/20 for severe diarrhea and weakness which was attributed to Augmentin use  Patient was started on probiotic and diarrhea resolved  Creatinine within known baseline, last creatinine 2 02 mg/d in January  Samantha Ferrara is here today with his support person Yahaira Ravi  She states he has been doing well since leaving the hospital and that his appetite is better, no more diarrhea  Samantha Ferrara denies chest pain, SOB, nausea, vomiting, diarrhea, numbness, tingling, dizziness  The medical record, including Care Everywhere and media tabs were reviewed  ROS:   All the systems were reviewed and were negative except as documented on the HPI  Allergies: Patient has no known allergies      Medications:   Current Outpatient Medications:     acetaminophen (TYLENOL) 325 mg tablet, Take by mouth as needed, Disp: , Rfl:     amLODIPine (NORVASC) 10 mg tablet, Take 10 mg by mouth daily, Disp: , Rfl:     aspirin 81 MG tablet, Take 81 mg by mouth daily, Disp: , Rfl:     b complex-vitamin C-folic acid (NEPHROCAPS) 1 mg capsule, Take 1 capsule by mouth daily with dinner, Disp: 60 capsule, Rfl: 0    cholecalciferol (VITAMIN D3) 1,000 units tablet, Take 1,000 Units by mouth daily, Disp: , Rfl:     colchicine (COLCRYS) 0 6 mg tablet, Take 1 tablet (0 6 mg total) by mouth daily, Disp: 90 tablet, Rfl: 3    insulin detemir (LEVEMIR) 100 units/mL subcutaneous injection, Inject 10 Units under the skin daily at bedtime, Disp: , Rfl:     insulin lispro (HumaLOG) 100 units/mL injection, Inject under the skin 3 (three) times a day before meals, Disp: , Rfl:     Insulin Syringe-Needle U-100 30G X 1/2" 1 ML MISC, by Does not apply route 3 (three) times a day, Disp: 100 each, Rfl: 5    methenamine hippurate (HIPREX) 1 g tablet, Take 1 g by mouth 3 (three) times a day, Disp: , Rfl:     oxyCODONE-acetaminophen (PERCOCET) 5-325 mg per tablet, Take 1 tablet by mouth every 4 (four) hours as needed for moderate pain, Disp: , Rfl:   pantoprazole (PROTONIX) 40 mg tablet, Take 1 tablet (40 mg total) by mouth daily in the early morning, Disp: , Rfl: 0    saccharomyces boulardii (FLORASTOR) 250 mg capsule, Take 1 capsule (250 mg total) by mouth 2 (two) times a day, Disp: 10 capsule, Rfl: 0    simethicone (MYLICON) 80 mg chewable tablet, Chew 1 tablet (80 mg total) every 6 (six) hours as needed for flatulence, Disp: 90 tablet, Rfl: 5    sodium bicarbonate 650 mg tablet, Take 1 tablet (650 mg total) by mouth 2 (two) times daily after meals, Disp: , Rfl: 0    tamsulosin (FLOMAX) 0 4 mg, Take 1 capsule (0 4 mg total) by mouth daily with dinner, Disp: , Rfl: 0    Past Medical History:   Diagnosis Date    Acute pancreatitis without infection or necrosis 9/26/2019    Anemia of chronic renal failure     BPH (benign prostatic hyperplasia)     Cancer (HCC)     liver cancer    Cardiac disease     Chronic kidney disease, stage 3 (HCC)     Coronary artery disease     Diabetes mellitus ([address] )     DJD (degenerative joint disease)     Essential hypertension     Gait disturbance     Generalized weakness     GERD (gastroesophageal reflux disease)     Gout     History of transfusion     Hydronephrosis     Hyperlipidemia     Hypertension     [facility] cancer [facility]     Pressure injury of skin     Proteinuria     Renal disorder     Retention of urine     Thrombophlebitis migrans     Type 2 diabetes mellitus ([facility] )     Type 2 diabetes 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, with long-term current use of insulin ([facility] ) 1/23/2019     Past Surgical History:   Procedure Laterality Date    CORONARY ANGIOPLASTY WITH STENT PLACEMENT      IR IMAGE GUIDED BIOPSY/ASPIRATION LIVER  1/24/2019    IR TUBE PLACEMENT ROQUE  9/6/2019     Family History   Problem Relation Age of Onset    Cancer Mother     Hypertension Father     Cancer Brother     Cancer Maternal Grandmother     Cancer Paternal Aunt       reports that he has never smoked   He has never used smokeless tobacco  He reports that he does not drink alcohol or use drugs  Physical Exam:   Vitals:    01/16/20 0930   BP: 116/62   BP Location: Left arm   Patient Position: Sitting   Cuff Size: Standard   Pulse: 78   SpO2: 99%   Weight: 70 8 kg (156 lb)   Height: 5' 8" (1 727 m)     Body mass index is 23 72 kg/m²  General: conscious, cooperative, in not acute distress  Eyes: conjunctivae pink, anicteric sclerae  ENT: lips and mucous membranes moist  Neck: no masses, flat neck veins  Chest: clear breath sounds bilateral, no crackles, ronchus or wheezings  CVS: distinct S1 & S2, normal rate, regular rhythm  Abdomen: soft, non-tender, non-distended, normoactive bowel sounds  Extremities: no edema of both legs  Skin: no rash  Neuro: awake, alert, oriented      Lab Results:  Results from last 7 days   Lab Units 01/12/20  0527 01/11/20 01/10/20  1443   WBC Thousand/uL 6 80 6 00 9 30   HEMOGLOBIN g/dL 9 6* 9 6* 11 3*   HEMATOCRIT % 29 0* 29 7* 34 8*   PLATELETS Thousands/uL 151 148* 197   POTASSIUM mmol/L 4 1 3 4* 3 5   CHLORIDE mmol/L 111* 111* 106   CO2 mmol/L 24 24 23   BUN mg/dL 23 17 19   CREATININE mg/dL 2 02* 2 00* 2 22*   CALCIUM mg/dL 8 1* 8 0* 8 7   MAGNESIUM mg/dL 1 8* 1 4*  --    PHOSPHORUS mg/dL  --  3 2  --        Discussion:    Patient Instructions    Please call the office with any questions or concerns   Avoid NSAIDs including: ibuprofen, Advil, aleve, motrin, naprosyn   Please obtain labs prior to your next appointment  Portions of the record may have been created with voice recognition software  Occasional wrong word or "sound a like" substitutions may have occurred due to the inherent limitations of voice recognition software  Read the chart carefully and recognize, using context, where substitutions have occurred  If you have any questions, please contact the dictating provider

## 2020-01-16 NOTE — PATIENT INSTRUCTIONS
 Please call the office with any questions or concerns   Avoid NSAIDs including: ibuprofen, Advil, aleve, motrin, naprosyn   Please obtain labs prior to your next appointment

## 2020-01-23 ENCOUNTER — APPOINTMENT (OUTPATIENT)
Dept: LAB | Facility: CLINIC | Age: 80
End: 2020-01-23
Payer: COMMERCIAL

## 2020-01-23 ENCOUNTER — TRANSCRIBE ORDERS (OUTPATIENT)
Dept: LAB | Facility: CLINIC | Age: 80
End: 2020-01-23

## 2020-01-23 DIAGNOSIS — C7A.8 NEUROENDOCRINE CARCINOMA METASTATIC TO LIVER (HCC): ICD-10-CM

## 2020-01-23 DIAGNOSIS — C7B.8 NEUROENDOCRINE CARCINOMA METASTATIC TO LIVER (HCC): ICD-10-CM

## 2020-01-23 DIAGNOSIS — R63.4 WEIGHT LOSS: ICD-10-CM

## 2020-01-23 LAB
ALBUMIN SERPL BCP-MCNC: 3 G/DL (ref 3.5–5)
ALP SERPL-CCNC: 170 U/L (ref 46–116)
ALT SERPL W P-5'-P-CCNC: 31 U/L (ref 12–78)
ANION GAP SERPL CALCULATED.3IONS-SCNC: 8 MMOL/L (ref 4–13)
AST SERPL W P-5'-P-CCNC: 12 U/L (ref 5–45)
BASOPHILS # BLD AUTO: 0.08 THOUSANDS/ΜL (ref 0–0.1)
BASOPHILS NFR BLD AUTO: 1 % (ref 0–1)
BILIRUB SERPL-MCNC: 0.62 MG/DL (ref 0.2–1)
BUN SERPL-MCNC: 28 MG/DL (ref 5–25)
CALCIUM SERPL-MCNC: 8.8 MG/DL (ref 8.3–10.1)
CHLORIDE SERPL-SCNC: 103 MMOL/L (ref 100–108)
CO2 SERPL-SCNC: 26 MMOL/L (ref 21–32)
CREAT SERPL-MCNC: 2.42 MG/DL (ref 0.6–1.3)
EOSINOPHIL # BLD AUTO: 0.13 THOUSAND/ΜL (ref 0–0.61)
EOSINOPHIL NFR BLD AUTO: 1 % (ref 0–6)
ERYTHROCYTE [DISTWIDTH] IN BLOOD BY AUTOMATED COUNT: 15.5 % (ref 11.6–15.1)
GFR SERPL CREATININE-BSD FRML MDRD: 24 ML/MIN/1.73SQ M
GLUCOSE P FAST SERPL-MCNC: 273 MG/DL (ref 65–99)
HCT VFR BLD AUTO: 36.2 % (ref 36.5–49.3)
HGB BLD-MCNC: 11.1 G/DL (ref 12–17)
IMM GRANULOCYTES # BLD AUTO: 0.04 THOUSAND/UL (ref 0–0.2)
IMM GRANULOCYTES NFR BLD AUTO: 0 % (ref 0–2)
LYMPHOCYTES # BLD AUTO: 1.63 THOUSANDS/ΜL (ref 0.6–4.47)
LYMPHOCYTES NFR BLD AUTO: 15 % (ref 14–44)
MAGNESIUM SERPL-MCNC: 1.7 MG/DL (ref 1.6–2.6)
MCH RBC QN AUTO: 29.2 PG (ref 26.8–34.3)
MCHC RBC AUTO-ENTMCNC: 30.7 G/DL (ref 31.4–37.4)
MCV RBC AUTO: 95 FL (ref 82–98)
MONOCYTES # BLD AUTO: 1.07 THOUSAND/ΜL (ref 0.17–1.22)
MONOCYTES NFR BLD AUTO: 10 % (ref 4–12)
NEUTROPHILS # BLD AUTO: 7.95 THOUSANDS/ΜL (ref 1.85–7.62)
NEUTS SEG NFR BLD AUTO: 73 % (ref 43–75)
NRBC BLD AUTO-RTO: 0 /100 WBCS
PLATELET # BLD AUTO: 194 THOUSANDS/UL (ref 149–390)
PMV BLD AUTO: 11.9 FL (ref 8.9–12.7)
POTASSIUM SERPL-SCNC: 3.9 MMOL/L (ref 3.5–5.3)
PROT SERPL-MCNC: 7 G/DL (ref 6.4–8.2)
RBC # BLD AUTO: 3.8 MILLION/UL (ref 3.88–5.62)
SODIUM SERPL-SCNC: 137 MMOL/L (ref 136–145)
TSH SERPL DL<=0.05 MIU/L-ACNC: 1.96 UIU/ML (ref 0.36–3.74)
WBC # BLD AUTO: 10.9 THOUSAND/UL (ref 4.31–10.16)

## 2020-01-23 PROCEDURE — 80053 COMPREHEN METABOLIC PANEL: CPT

## 2020-01-23 PROCEDURE — 85025 COMPLETE CBC W/AUTO DIFF WBC: CPT

## 2020-01-23 PROCEDURE — 83735 ASSAY OF MAGNESIUM: CPT

## 2020-01-23 PROCEDURE — 36415 COLL VENOUS BLD VENIPUNCTURE: CPT

## 2020-01-23 PROCEDURE — 86316 IMMUNOASSAY TUMOR OTHER: CPT

## 2020-01-23 PROCEDURE — 84443 ASSAY THYROID STIM HORMONE: CPT

## 2020-01-28 LAB — CGA SERPL-MCNC: 2248 NG/ML (ref 0–101.8)

## 2020-02-03 ENCOUNTER — HOSPITAL ENCOUNTER (OUTPATIENT)
Dept: RADIOLOGY | Facility: HOSPITAL | Age: 80
Discharge: HOME | End: 2020-02-03
Payer: COMMERCIAL

## 2020-02-03 VITALS
SYSTOLIC BLOOD PRESSURE: 112 MMHG | HEART RATE: 80 BPM | DIASTOLIC BLOOD PRESSURE: 59 MMHG | OXYGEN SATURATION: 98 % | RESPIRATION RATE: 18 BRPM

## 2020-02-03 DIAGNOSIS — R52 PAIN: ICD-10-CM

## 2020-02-03 DIAGNOSIS — Z43.4 CHOLECYSTOSTOMY CARE (HCC): Primary | ICD-10-CM

## 2020-02-03 PROCEDURE — C1769 GUIDE WIRE: HCPCS

## 2020-02-03 PROCEDURE — C1729 CATH, DRAINAGE: HCPCS

## 2020-02-03 PROCEDURE — 87186 SC STD MICRODIL/AGAR DIL: CPT | Performed by: RADIOLOGY

## 2020-02-03 PROCEDURE — 87070 CULTURE OTHR SPECIMN AEROBIC: CPT | Performed by: RADIOLOGY

## 2020-02-03 PROCEDURE — 87205 SMEAR GRAM STAIN: CPT | Performed by: RADIOLOGY

## 2020-02-03 PROCEDURE — 47536 EXCHANGE BILIARY DRG CATH: CPT

## 2020-02-03 PROCEDURE — 47536 EXCHANGE BILIARY DRG CATH: CPT | Performed by: RADIOLOGY

## 2020-02-03 PROCEDURE — 87147 CULTURE TYPE IMMUNOLOGIC: CPT | Performed by: RADIOLOGY

## 2020-02-03 PROCEDURE — 87077 CULTURE AEROBIC IDENTIFY: CPT | Performed by: RADIOLOGY

## 2020-02-03 RX ORDER — 0.9 % SODIUM CHLORIDE 0.9 %
10 VIAL (ML) INJECTION 2 TIMES WEEKLY
Qty: 80 ML | Refills: 5 | Status: SHIPPED | OUTPATIENT
Start: 2020-02-03 | End: 2020-02-20 | Stop reason: HOSPADM

## 2020-02-03 RX ORDER — LIDOCAINE HYDROCHLORIDE 10 MG/ML
INJECTION, SOLUTION EPIDURAL; INFILTRATION; INTRACAUDAL; PERINEURAL CODE/TRAUMA/SEDATION MEDICATION
Status: COMPLETED | OUTPATIENT
Start: 2020-02-03 | End: 2020-02-03

## 2020-02-03 RX ORDER — LIDOCAINE HYDROCHLORIDE 20 MG/ML
SOLUTION OROPHARYNGEAL CODE/TRAUMA/SEDATION MEDICATION
Status: COMPLETED | OUTPATIENT
Start: 2020-02-03 | End: 2020-02-03

## 2020-02-03 RX ADMIN — IOHEXOL 10 ML: 300 INJECTION, SOLUTION INTRAVENOUS at 15:02

## 2020-02-03 RX ADMIN — LIDOCAINE HYDROCHLORIDE 10 ML: 20 SOLUTION ORAL; TOPICAL at 14:19

## 2020-02-03 RX ADMIN — LIDOCAINE HYDROCHLORIDE 10 ML: 10 INJECTION, SOLUTION EPIDURAL; INFILTRATION; INTRACAUDAL; PERINEURAL at 14:18

## 2020-02-03 NOTE — PROCEDURES
Interventional Radiology Procedure Note    PATIENT NAME: Enma Heredia  : 1940  MRN: [id number]     Pre-op Diagnosis:   1  Pain      2  Indwelling percutaneous cholecystostomy tube in the right upper quadrant  Now with hyperglycemia, and pus in the tube  Personal history of metastatic neuroendocrine carcinoma  Post-op Diagnosis:   1  Pain      2  Same    Procedure: Tube change    Surgeon:   Abbie Monterroso MD  Assistants:     No qualified resident was available, Resident is only observing    Estimated Blood Loss: None    Findings: The tube was occluded, and I could not flush it  The patient's wife reports that the visiting nurse was flushing it once a week  I upsized the tube from [address] to [address]  Gallbladder was emptied  The family was given written and pictorial instructions for flushing  Flushes were provided  Primary care will call and a script for saline flushes  I contacted primary care about reconsidering a cholecystectomy      Specimens: None     Complications:  None     Anesthesia: None    Abbie Monterroso MD     Date: 2/3/2020  Time: 5:00 PM

## 2020-02-04 ENCOUNTER — OFFICE VISIT (OUTPATIENT)
Dept: HEMATOLOGY ONCOLOGY | Facility: CLINIC | Age: 80
End: 2020-02-04
Payer: COMMERCIAL

## 2020-02-04 VITALS
HEART RATE: 84 BPM | DIASTOLIC BLOOD PRESSURE: 58 MMHG | TEMPERATURE: 99 F | SYSTOLIC BLOOD PRESSURE: 110 MMHG | RESPIRATION RATE: 18 BRPM | WEIGHT: 158.2 LBS | BODY MASS INDEX: 23.98 KG/M2 | OXYGEN SATURATION: 98 % | HEIGHT: 68 IN

## 2020-02-04 DIAGNOSIS — C7A.8 NEUROENDOCRINE CARCINOMA METASTATIC TO LIVER (HCC): Primary | ICD-10-CM

## 2020-02-04 DIAGNOSIS — K81.0 ACUTE ACALCULOUS CHOLECYSTITIS: ICD-10-CM

## 2020-02-04 DIAGNOSIS — C7B.8 NEUROENDOCRINE CARCINOMA METASTATIC TO LIVER (HCC): Primary | ICD-10-CM

## 2020-02-04 PROCEDURE — 99214 OFFICE O/P EST MOD 30 MIN: CPT | Performed by: INTERNAL MEDICINE

## 2020-02-04 NOTE — PROGRESS NOTES
Hematology/Oncology Outpatient Follow-up  Enma Heredia [de-identified] y o  male 1940 [id number]    Date:  2/4/2020        Assessment and Plan:  1  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 [facility])  The patient will be continued on the lanreotide injections every 4 weeks which is improving his symptoms including good control of his diarrhea  We will check his chromogranin A serum level prior to each visit  - CBC and differential; Future  - Comprehensive metabolic panel; Future  - Magnesium; Future  - Chromogranin A; Future    2  Acute acalculous cholecystitis  I had a discussion with Dr Jae Angulo from the interventional radiology team regarding the patient's acute on chronic cholecystitis  The recommendation is to reconsider the surgical approach of cholecystectomy since the patient continues to have severe pain and recurrent infection at the gallbladder area which is affecting his quality of life a most likely will affect his life span as well  The patient was asked to go back to discuss treatment options with Dr Gerard Gallagher his surgeon and to consider getting the surgery done at a larger [address] [place] [address], where post surgical care is more robust for elderly patients and the high risk patients like him  HPI:  The patient came today for a follow-up visit  He continues to be on the lanreotide injections on every 4 week basis for his low-grade neuroendocrine tumor which is improving his GI symptoms with very good control of his diarrhea  The patient apparently had to get his occluded indwelling percutaneous cholecystostomy tube manipulated by the interventional radiology team yesterday  Apparently he had pus in the tube and severe pain in the right upper quadrant area  His most recent blood work on 01/23/2020 showed a white cell count of 10 9 with hemoglobin of 11 1, his platelet count was 598  Creatinine 2 4 with normal liver enzymes  The chromogranin a level was 2248 ng/mL using a new methodology    The patient seemed very weak with shuffled gait he can only ambulate with the help of a walker  Oncology History    Patient has multiple comorbid conditions including history of coronary artery disease, diabetes mellitus, hypertension, hyperlipidemia, and more recently metastatic neuroendocrine tumor       The patient was seen in consultation when he was in the hospital on the 5th February 2019 at that time he had significant weakness status post vasovagal syncope   He was evaluated with a CT scan of the abdomen and pelvis on the 23rd January 2019 which showed partially calcified mesenteric mass with multiple liver lesions compatible most likely with carcinoid malignancy   He also was found to have bilateral hydronephrosis and severe bladder wall thickening with prostatomegaly      The patient then had a core biopsy from 1 of the liver lesions on the 24th of January which showed well-differentiated neuroendocrine tumor grade 1-2 with Ki 67 of 3-5%      His chromogranin level  February 2019 was 273   His 24 hour urine for HIAA was 48 which is above normal   The patient was started on Lanreotide in March 2019            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 ([facility] [address] )    1/24/2019 Initial Diagnosis     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 ([facility] )      1/24/2019 Biopsy     A  Liver mass, core biopsy:  - Well differentiated neuroendocrine tumor, G2         3/2019 -  Chemotherapy     Lanreotide 120 mg every 4 weeks injections          Interval history:    ROS: Review of Systems   Constitutional: Positive for appetite change and fatigue  Negative for diaphoresis and fever  HENT: Positive for hearing loss  Negative for congestion, dental problem, facial swelling, tinnitus and trouble swallowing  Eyes: Negative for visual disturbance  Respiratory: Negative for cough, chest tightness, shortness of breath and wheezing  Cardiovascular: Negative for chest pain and leg swelling     Gastrointestinal: Positive for abdominal pain  Negative for abdominal distention, blood in stool, constipation, diarrhea, nausea and vomiting  Genitourinary: Negative for dysuria, hematuria and urgency  Musculoskeletal: Negative for arthralgias, myalgias and neck pain  Skin: Negative  Negative for color change, pallor, rash and wound  Neurological: Positive for weakness  Negative for dizziness, numbness and headaches  Hematological: Negative for adenopathy  Psychiatric/Behavioral: Negative for agitation, behavioral problems, confusion, hallucinations, self-injury and sleep disturbance  The patient is not nervous/anxious and is not hyperactive  Past Medical History:   Diagnosis Date    Acute pancreatitis without infection or necrosis 9/26/2019    Anemia of chronic renal failure     BPH (benign prostatic hyperplasia)     Cancer (HCC)     liver cancer    Cardiac disease     Chronic kidney disease, stage 3 (HCC)     Coronary artery disease     Diabetes mellitus ([facility] )     DJD (degenerative joint disease)     Essential hypertension     Gait disturbance     Generalized weakness     GERD (gastroesophageal reflux disease)     Gout     History of transfusion     Hydronephrosis     Hyperlipidemia     Hypertension     Liver cancer ([facility] )     Pressure injury of skin     Proteinuria     Renal disorder     Retention of urine     Thrombophlebitis migrans     Type 2 diabetes mellitus ([facility] )     Type 2 diabetes 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, with long-term current use of insulin ([facility] ) 1/23/2019       Past Surgical History:   Procedure Laterality Date    CORONARY ANGIOPLASTY WITH STENT PLACEMENT      IR IMAGE GUIDED BIOPSY/ASPIRATION LIVER  1/24/2019    IR TUBE PLACEMENT ROQUE  9/6/2019       Social History     Socioeconomic History    Marital status:       Spouse name: None    Number of children: None    Years of education: None    Highest education level: None   Occupational History    None Social Needs    Financial resource strain: None    Food insecurity:     Worry: None     Inability: None    Transportation needs:     Medical: None     Non-medical: None   Tobacco Use    Smoking status: Never Smoker    Smokeless tobacco: Never Used   Substance and Sexual Activity    Alcohol use: Never     Frequency: Never    Drug use: Never    Sexual activity: Never   Lifestyle    Physical activity:     Days per week: None     Minutes per session: None    Stress: None   Relationships    Social connections:     Talks on phone: None     Gets together: None     Attends [employer] service: None     Active member of club or organization: None     Attends meetings of clubs or organizations: None     Relationship status: None    Intimate partner violence:     Fear of current or ex partner: None     Emotionally abused: None     Physically abused: None     Forced sexual activity: None   Other Topics Concern    None   Social History Narrative    None       Family History   Problem Relation Age of Onset    Cancer Mother     Hypertension Father     Cancer Brother     Cancer Maternal Grandmother     Cancer Paternal Aunt        No Known Allergies      Current Outpatient Medications:     acetaminophen (TYLENOL) 325 mg tablet, Take by mouth as needed, Disp: , Rfl:     amLODIPine (NORVASC) 10 mg tablet, Take 10 mg by mouth daily, Disp: , Rfl:     aspirin 81 MG tablet, Take 81 mg by mouth daily, Disp: , Rfl:     b complex-vitamin C-folic acid (NEPHROCAPS) 1 mg capsule, Take 1 capsule by mouth daily with dinner, Disp: 60 capsule, Rfl: 0    cholecalciferol (VITAMIN D3) 1,000 units tablet, Take 1,000 Units by mouth daily, Disp: , Rfl:     colchicine (COLCRYS) 0 6 mg tablet, Take 1 tablet (0 6 mg total) by mouth daily, Disp: 90 tablet, Rfl: 3    insulin detemir (LEVEMIR) 100 units/mL subcutaneous injection, Inject 10 Units under the skin daily at bedtime, Disp: , Rfl:     insulin lispro (HumaLOG) 100 units/mL injection, Inject under the skin 3 (three) times a day before meals, Disp: , Rfl:     Insulin Syringe-Needle U-100 30G X 1/2" 1 ML MISC, by Does not apply route 3 (three) times a day, Disp: 100 each, Rfl: 5    methenamine hippurate (HIPREX) 1 g tablet, Take 1 g by mouth 3 (three) times a day, Disp: , Rfl:     pantoprazole (PROTONIX) 40 mg tablet, Take 1 tablet (40 mg total) by mouth daily in the early morning, Disp: , Rfl: 0    saccharomyces boulardii (FLORASTOR) 250 mg capsule, Take 1 capsule (250 mg total) by mouth 2 (two) times a day, Disp: 10 capsule, Rfl: 0    simethicone (MYLICON) 80 mg chewable tablet, Chew 1 tablet (80 mg total) every 6 (six) hours as needed for flatulence, Disp: 90 tablet, Rfl: 5    sodium bicarbonate 650 mg tablet, Take 1 tablet (650 mg total) by mouth 2 (two) times daily after meals, Disp: , Rfl: 0    sodium chloride, PF, 0 9 %, Infuse 10 mL into a venous catheter 2 (two) times a week To flush gallbladder catheter, Disp: 80 mL, Rfl: 5    tamsulosin (FLOMAX) 0 4 mg, Take 1 capsule (0 4 mg total) by mouth daily with dinner, Disp: , Rfl: 0    oxyCODONE-acetaminophen (PERCOCET) 5-325 mg per tablet, Take 1 tablet by mouth every 4 (four) hours as needed for moderate pain, Disp: , Rfl:   No current facility-administered medications for this visit  Physical Exam:  /58 (BP Location: Left arm, Cuff Size: Adult)   Pulse 84   Temp 99 °F (37 2 °C) (Tympanic)   Resp 18   Ht 5' 8" (1 727 m)   Wt 71 8 kg (158 lb 3 2 oz)   SpO2 98%   BMI 24 05 kg/m²     Physical Exam   Constitutional: He is oriented to person, place, and time  He appears well-developed and well-nourished  HENT:   Head: Normocephalic and atraumatic  Nose: Nose normal    Mouth/Throat: Oropharynx is clear and moist    Eyes: Pupils are equal, round, and reactive to light  Conjunctivae and EOM are normal  Right eye exhibits no discharge  Left eye exhibits no discharge  No scleral icterus     Neck: Normal range of motion  Neck supple  No tracheal deviation present  No thyromegaly present  Cardiovascular: Normal rate, regular rhythm and normal heart sounds  Exam reveals no friction rub  No murmur heard  Pulmonary/Chest: Effort normal and breath sounds normal  No respiratory distress  He has no wheezes  He has no rales  He exhibits no tenderness  Abdominal: Soft  Bowel sounds are normal  He exhibits no distension and no mass  There is no hepatosplenomegaly, splenomegaly or hepatomegaly  There is tenderness (On palpation in the right upper quadrant area)  There is no rebound and no guarding  He has percutaneous tube in the right upper quadrant area and Haile catheter in place  Musculoskeletal: Normal range of motion  He exhibits no edema, tenderness or deformity  Ambulatory dysfunction uses the walker with shuffled gait   Lymphadenopathy:     He has no cervical adenopathy  Neurological: He is alert and oriented to person, place, and time  He has normal reflexes  He displays normal reflexes  No cranial nerve deficit  Coordination normal    Skin: Skin is warm and dry  No rash noted  No erythema  No pallor  Psychiatric: He has a normal mood and affect  His behavior is normal  Judgment and thought content normal          Labs:  Lab Results   Component Value Date    WBC 10 90 (H) 01/23/2020    HGB 11 1 (L) 01/23/2020    HCT 36 2 (L) 01/23/2020    MCV 95 01/23/2020     01/23/2020     Lab Results   Component Value Date    K 3 9 01/23/2020     01/23/2020    CO2 26 01/23/2020    BUN 28 (H) 01/23/2020    CREATININE 2 42 (H) 01/23/2020    GLUF 273 (H) 01/23/2020    CALCIUM 8 8 01/23/2020    AST 12 01/23/2020    ALT 31 01/23/2020    ALKPHOS 170 (H) 01/23/2020    EGFR 24 01/23/2020     No results found for: TSH    Patient voiced understanding and agreement in the above discussion  Aware to contact our office with questions/symptoms in the interim

## 2020-02-05 ENCOUNTER — HOSPITAL ENCOUNTER (INPATIENT)
Facility: HOSPITAL | Age: 80
LOS: 15 days | Discharge: HOME HEALTH | DRG: 415 | End: 2020-02-20
Attending: SPECIALIST | Admitting: SPECIALIST
Payer: COMMERCIAL

## 2020-02-05 ENCOUNTER — TELEPHONE (OUTPATIENT)
Dept: SURGERY | Facility: CLINIC | Age: 80
End: 2020-02-05

## 2020-02-05 ENCOUNTER — OFFICE VISIT (OUTPATIENT)
Dept: SURGERY | Facility: CLINIC | Age: 80
End: 2020-02-05
Payer: COMMERCIAL

## 2020-02-05 VITALS
TEMPERATURE: 98.1 F | WEIGHT: 158 LBS | DIASTOLIC BLOOD PRESSURE: 60 MMHG | SYSTOLIC BLOOD PRESSURE: 112 MMHG | BODY MASS INDEX: 23.95 KG/M2 | HEIGHT: 68 IN | RESPIRATION RATE: 18 BRPM | HEART RATE: 81 BPM

## 2020-02-05 DIAGNOSIS — N18.30 CKD (CHRONIC KIDNEY DISEASE) STAGE 3, GFR 30-59 ML/MIN (HCC): ICD-10-CM

## 2020-02-05 DIAGNOSIS — K81.0 ACUTE CHOLECYSTITIS: ICD-10-CM

## 2020-02-05 DIAGNOSIS — K81.0 ACUTE ACALCULOUS CHOLECYSTITIS: Primary | ICD-10-CM

## 2020-02-05 DIAGNOSIS — I51.9 CARDIAC DISEASE: Chronic | ICD-10-CM

## 2020-02-05 DIAGNOSIS — Z87.438 HISTORY OF BPH: ICD-10-CM

## 2020-02-05 DIAGNOSIS — R33.9 URINARY RETENTION: ICD-10-CM

## 2020-02-05 DIAGNOSIS — E11.22 TYPE 2 DIABETES MELLITUS WITH STAGE 4 CHRONIC KIDNEY DISEASE, WITH LONG-TERM CURRENT USE OF INSULIN (HCC): Primary | Chronic | ICD-10-CM

## 2020-02-05 DIAGNOSIS — N18.30 ANEMIA DUE TO STAGE 3 CHRONIC KIDNEY DISEASE (HCC): ICD-10-CM

## 2020-02-05 DIAGNOSIS — A04.72 C. DIFFICILE COLITIS: ICD-10-CM

## 2020-02-05 DIAGNOSIS — D63.1 ANEMIA DUE TO STAGE 3 CHRONIC KIDNEY DISEASE (HCC): ICD-10-CM

## 2020-02-05 DIAGNOSIS — I10 ESSENTIAL HYPERTENSION: Chronic | ICD-10-CM

## 2020-02-05 DIAGNOSIS — Z79.4 TYPE 2 DIABETES MELLITUS WITH STAGE 4 CHRONIC KIDNEY DISEASE, WITH LONG-TERM CURRENT USE OF INSULIN (HCC): Primary | Chronic | ICD-10-CM

## 2020-02-05 DIAGNOSIS — N18.4 TYPE 2 DIABETES MELLITUS WITH STAGE 4 CHRONIC KIDNEY DISEASE, WITH LONG-TERM CURRENT USE OF INSULIN (HCC): Primary | Chronic | ICD-10-CM

## 2020-02-05 DIAGNOSIS — M10.9 GOUT, UNSPECIFIED CAUSE, UNSPECIFIED CHRONICITY, UNSPECIFIED SITE: ICD-10-CM

## 2020-02-05 LAB
ALBUMIN SERPL BCP-MCNC: 3.3 G/DL (ref 3.5–5.7)
ALP SERPL-CCNC: 101 U/L (ref 55–165)
ALT SERPL W P-5'-P-CCNC: 10 U/L (ref 7–52)
ANION GAP SERPL CALCULATED.3IONS-SCNC: 13 MMOL/L (ref 4–13)
AST SERPL W P-5'-P-CCNC: 7 U/L (ref 13–39)
BASOPHILS # BLD AUTO: 0 THOUSANDS/ΜL (ref 0–0.1)
BASOPHILS NFR BLD AUTO: 0 % (ref 0–2)
BILIRUB SERPL-MCNC: 0.3 MG/DL (ref 0.2–1)
BUN SERPL-MCNC: 50 MG/DL (ref 7–25)
CALCIUM SERPL-MCNC: 8.4 MG/DL (ref 8.6–10.5)
CHLORIDE SERPL-SCNC: 98 MMOL/L (ref 98–107)
CO2 SERPL-SCNC: 19 MMOL/L (ref 21–31)
CREAT SERPL-MCNC: 3.07 MG/DL (ref 0.7–1.3)
EOSINOPHIL # BLD AUTO: 0 THOUSAND/ΜL (ref 0–0.61)
EOSINOPHIL NFR BLD AUTO: 0 % (ref 0–5)
ERYTHROCYTE [DISTWIDTH] IN BLOOD BY AUTOMATED COUNT: 15.8 % (ref 11.5–14.5)
GFR SERPL CREATININE-BSD FRML MDRD: 18 ML/MIN/1.73SQ M
GLUCOSE SERPL-MCNC: 436 MG/DL (ref 65–140)
GLUCOSE SERPL-MCNC: 474 MG/DL (ref 65–99)
HCT VFR BLD AUTO: 32.8 % (ref 42–47)
HGB BLD-MCNC: 10.5 G/DL (ref 14–18)
INR PPP: 1.21 (ref 0.9–1.5)
LYMPHOCYTES # BLD AUTO: 1 THOUSANDS/ΜL (ref 0.6–4.47)
LYMPHOCYTES NFR BLD AUTO: 8 % (ref 21–51)
MCH RBC QN AUTO: 29.3 PG (ref 26–34)
MCHC RBC AUTO-ENTMCNC: 32 G/DL (ref 31–37)
MCV RBC AUTO: 92 FL (ref 81–99)
MONOCYTES # BLD AUTO: 1.3 THOUSAND/ΜL (ref 0.17–1.22)
MONOCYTES NFR BLD AUTO: 10 % (ref 2–12)
NEUTROPHILS # BLD AUTO: 9.9 THOUSANDS/ΜL (ref 1.4–6.5)
NEUTS SEG NFR BLD AUTO: 81 % (ref 42–75)
PLATELET # BLD AUTO: 201 THOUSANDS/UL (ref 149–390)
PMV BLD AUTO: 8.8 FL (ref 8.6–11.7)
POTASSIUM SERPL-SCNC: 3.9 MMOL/L (ref 3.5–5.5)
PROT SERPL-MCNC: 6.2 G/DL (ref 6.4–8.9)
PROTHROMBIN TIME: 14.1 SECONDS (ref 10.2–13)
RBC # BLD AUTO: 3.57 MILLION/UL (ref 4.3–5.9)
SODIUM SERPL-SCNC: 130 MMOL/L (ref 134–143)
WBC # BLD AUTO: 12.2 THOUSAND/UL (ref 4.8–10.8)

## 2020-02-05 PROCEDURE — 82948 REAGENT STRIP/BLOOD GLUCOSE: CPT

## 2020-02-05 PROCEDURE — 99253 IP/OBS CNSLTJ NEW/EST LOW 45: CPT | Performed by: INTERNAL MEDICINE

## 2020-02-05 PROCEDURE — 99222 1ST HOSP IP/OBS MODERATE 55: CPT | Performed by: SPECIALIST

## 2020-02-05 PROCEDURE — 99213 OFFICE O/P EST LOW 20 MIN: CPT | Performed by: SPECIALIST

## 2020-02-05 PROCEDURE — 85025 COMPLETE CBC W/AUTO DIFF WBC: CPT | Performed by: SPECIALIST

## 2020-02-05 PROCEDURE — 85610 PROTHROMBIN TIME: CPT | Performed by: SPECIALIST

## 2020-02-05 PROCEDURE — 80053 COMPREHEN METABOLIC PANEL: CPT | Performed by: SPECIALIST

## 2020-02-05 RX ORDER — CHOLECALCIFEROL (VITAMIN D3) 10 MCG
1 TABLET ORAL
Status: DISCONTINUED | OUTPATIENT
Start: 2020-02-05 | End: 2020-02-20 | Stop reason: HOSPADM

## 2020-02-05 RX ORDER — SIMETHICONE 80 MG
80 TABLET,CHEWABLE ORAL EVERY 6 HOURS PRN
Status: DISCONTINUED | OUTPATIENT
Start: 2020-02-05 | End: 2020-02-14 | Stop reason: SDUPTHER

## 2020-02-05 RX ORDER — SODIUM CHLORIDE 9 MG/ML
100 INJECTION, SOLUTION INTRAVENOUS CONTINUOUS
Status: DISCONTINUED | OUTPATIENT
Start: 2020-02-05 | End: 2020-02-08

## 2020-02-05 RX ORDER — 0.9 % SODIUM CHLORIDE 0.9 %
10 VIAL (ML) INJECTION 2 TIMES WEEKLY
Status: DISCONTINUED | OUTPATIENT
Start: 2020-02-06 | End: 2020-02-05

## 2020-02-05 RX ORDER — OXYCODONE HYDROCHLORIDE AND ACETAMINOPHEN 5; 325 MG/1; MG/1
1 TABLET ORAL EVERY 4 HOURS PRN
Status: DISCONTINUED | OUTPATIENT
Start: 2020-02-05 | End: 2020-02-20 | Stop reason: HOSPADM

## 2020-02-05 RX ORDER — ONDANSETRON 2 MG/ML
4 INJECTION INTRAMUSCULAR; INTRAVENOUS EVERY 6 HOURS PRN
Status: DISCONTINUED | OUTPATIENT
Start: 2020-02-05 | End: 2020-02-20 | Stop reason: HOSPADM

## 2020-02-05 RX ORDER — SODIUM BICARBONATE 650 MG/1
650 TABLET ORAL
Status: DISCONTINUED | OUTPATIENT
Start: 2020-02-05 | End: 2020-02-17

## 2020-02-05 RX ORDER — HEPARIN SODIUM 5000 [USP'U]/ML
5000 INJECTION, SOLUTION INTRAVENOUS; SUBCUTANEOUS EVERY 8 HOURS SCHEDULED
Status: DISCONTINUED | OUTPATIENT
Start: 2020-02-05 | End: 2020-02-20 | Stop reason: HOSPADM

## 2020-02-05 RX ORDER — SODIUM CHLORIDE 9 MG/ML
10 INJECTION INTRAVENOUS
Status: DISCONTINUED | OUTPATIENT
Start: 2020-02-06 | End: 2020-02-20 | Stop reason: HOSPADM

## 2020-02-05 RX ORDER — MELATONIN
1000 DAILY
Status: DISCONTINUED | OUTPATIENT
Start: 2020-02-05 | End: 2020-02-20 | Stop reason: HOSPADM

## 2020-02-05 RX ORDER — SACCHAROMYCES BOULARDII 250 MG
250 CAPSULE ORAL 2 TIMES DAILY
Status: DISPENSED | OUTPATIENT
Start: 2020-02-05 | End: 2020-02-11

## 2020-02-05 RX ORDER — COLCHICINE 0.6 MG/1
0.6 TABLET ORAL DAILY
Status: DISCONTINUED | OUTPATIENT
Start: 2020-02-05 | End: 2020-02-10

## 2020-02-05 RX ORDER — TAMSULOSIN HYDROCHLORIDE 0.4 MG/1
0.4 CAPSULE ORAL
Status: DISCONTINUED | OUTPATIENT
Start: 2020-02-05 | End: 2020-02-20 | Stop reason: HOSPADM

## 2020-02-05 RX ORDER — PANTOPRAZOLE SODIUM 40 MG/1
40 TABLET, DELAYED RELEASE ORAL
Status: DISCONTINUED | OUTPATIENT
Start: 2020-02-05 | End: 2020-02-20 | Stop reason: HOSPADM

## 2020-02-05 RX ORDER — AMLODIPINE BESYLATE 5 MG/1
10 TABLET ORAL DAILY
Status: DISCONTINUED | OUTPATIENT
Start: 2020-02-05 | End: 2020-02-15

## 2020-02-05 RX ADMIN — HEPARIN SODIUM 5000 UNITS: 5000 INJECTION INTRAVENOUS; SUBCUTANEOUS at 14:48

## 2020-02-05 RX ADMIN — PIPERACILLIN SODIUM AND TAZOBACTAM SODIUM 3.38 G: 3; .375 INJECTION, POWDER, LYOPHILIZED, FOR SOLUTION INTRAVENOUS at 13:31

## 2020-02-05 RX ADMIN — OXYCODONE HYDROCHLORIDE AND ACETAMINOPHEN 1 TABLET: 5; 325 TABLET ORAL at 12:29

## 2020-02-05 RX ADMIN — PIPERACILLIN SODIUM AND TAZOBACTAM SODIUM 3.38 G: 3; .375 INJECTION, POWDER, LYOPHILIZED, FOR SOLUTION INTRAVENOUS at 18:36

## 2020-02-05 RX ADMIN — SODIUM BICARBONATE 650 MG: 650 TABLET ORAL at 17:07

## 2020-02-05 RX ADMIN — Medication 1 CAPSULE: at 17:03

## 2020-02-05 RX ADMIN — HEPARIN SODIUM 5000 UNITS: 5000 INJECTION INTRAVENOUS; SUBCUTANEOUS at 22:30

## 2020-02-05 RX ADMIN — TAMSULOSIN HYDROCHLORIDE 0.4 MG: 0.4 CAPSULE ORAL at 17:04

## 2020-02-05 RX ADMIN — INSULIN LISPRO 4 UNITS: 100 INJECTION, SOLUTION INTRAVENOUS; SUBCUTANEOUS at 20:41

## 2020-02-05 RX ADMIN — SODIUM CHLORIDE 100 ML/HR: 9 INJECTION, SOLUTION INTRAVENOUS at 14:48

## 2020-02-05 RX ADMIN — Medication 250 MG: at 17:07

## 2020-02-05 NOTE — ASSESSMENT & PLAN NOTE
· Patient currently on outpatient treatment with Oncology  · Continue outpatient follow-up  · Supportive care

## 2020-02-05 NOTE — PROGRESS NOTES
Admitted to [facility]  Isolation maintained for history of ESBL in urine  20g heplock placed in left forearm-  NS @ 100cc's/hr initaited  Reg diet tolerated well  17F garcia draining yellow urine  History of St 3 CKD- BUN and Creat elevated  12F biliary tube noted in   Last BM today  Pharmacy made aware that pt took his meds today  Percocet given for right 6/10 abd pain  Wife and son @ bedside

## 2020-02-05 NOTE — H&P
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  The patient developed acute cholecystitis which has been managed with a percutaneous cholecystostomy tube  Attempts to cap the tube with been unsuccessful, and he developed recurrent acute cholecystitis when the tube became clogged  The patient requests a cholecystectomy, knowing that he is at extraordinarily high risk, not least developed because of the tumor burden in his liver  None the less, the believes that the poor quality of life with a cholecystostomy tube, and the complications that he has endured with it justify his decision to press for a cholecystectomy despite the risk  Plan:  Admission to the floor for antibiotics  Proceed with laparoscopic cholecystectomy, possible open procedure, as early as feasible  Medical consultation regarding diabetes, chronic renal insufficiency, benign prostatic hypertrophy, hypertension, cardiac disease and chronic anemia    History of Present Illness   HPI:  Yuko Gilman is a [de-identified] y o  male who presents with complaints of right upper quadrant abdominal pain associated with a blocked percutaneous cholecystostomy tube  He has had a percutaneous cholecystostomy tube for the past 3 months, and also developed gallstone pancreatitis after the the cholecystostomy tube was placed  The patient has metastatic neuroendocrine tumor from the small bowel to the liver, and his carcinoid syndrome is been managed with long acting somatostatin analogs    The patient believes that is remaining quality of life is been diminished by the cholecystitis, pancreatitis and the re-current complications with the percutaneous cholecystostomy tube and wishes to proceed with cholecystectomy despite the risk  Review of Systems   Constitutional: Positive for appetite change and fever  HENT: Negative for trouble swallowing  Respiratory: Positive for shortness of breath  Negative for cough  Cardiovascular: Negative for chest pain  Gastrointestinal: Positive for abdominal pain, diarrhea and nausea  Genitourinary:        Chronic bladder outlet obstruction from prostatic hypertrophy, with indwelling Haile catheter   Musculoskeletal: Positive for gait problem  Skin: Negative for color change  Neurological: Positive for weakness  Psychiatric/Behavioral: Positive for decreased concentration  The patient is nervous/anxious          Historical Information   Past Medical History:   Diagnosis Date    Acute pancreatitis without infection or necrosis 9/26/2019    Anemia of chronic renal failure     BPH (benign prostatic hyperplasia)     Cancer (HCC)     liver cancer    Cardiac disease     Chronic kidney disease, stage 3 (HCC)     Coronary artery disease     [facility]     DJD (degenerative joint disease)     Essential hypertension     Gait disturbance     Generalized weakness     GERD (gastroesophageal reflux disease)     Gout     History of transfusion     Hydronephrosis     Hyperlipidemia     Hypertension     [facility]     Pressure injury of skin     Proteinuria     Renal disorder     Retention of urine     Thrombophlebitis migrans     Type 2 diabetes mellitus ([facility] )     Type 2 diabetes 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, with long-term current use of insulin ([facility] ) 1/23/2019     Past Surgical History:   Procedure Laterality Date    CORONARY ANGIOPLASTY WITH STENT PLACEMENT      IR IMAGE GUIDED BIOPSY/ASPIRATION LIVER  1/24/2019    IR TUBE PLACEMENT ROQUE  9/6/2019     Social History   Social History     Substance and Sexual Activity   Alcohol Use Never    Frequency: Never     Social History     Substance and Sexual Activity   Drug Use Never     Social History     Tobacco Use   Smoking Status Never Smoker   Smokeless Tobacco Never Used     Family History:   Family History   Problem Relation Age of Onset    Cancer Mother     Hypertension Father     Cancer Brother     Cancer Maternal Grandmother     Cancer Paternal Aunt        Meds/Allergies   PTA meds:   Prior to Admission Medications   Prescriptions Last Dose Informant Patient Reported? Taking?    Insulin Syringe-Needle U-100 30G X 1/2" 1 ML MISC  Spouse/Significant Other No No   Sig: by Does not apply route 3 (three) times a day   acetaminophen (TYLENOL) 325 mg tablet  Spouse/Significant Other Yes No   Sig: Take by mouth as needed   amLODIPine (NORVASC) 10 mg tablet 2/5/2020 at Unknown time Spouse/Significant Other Yes Yes   Sig: Take 10 mg by mouth daily   aspirin 81 MG tablet 2/5/2020 at Unknown time Spouse/Significant Other Yes Yes   Sig: Take 81 mg by mouth daily   b complex-vitamin C-folic acid (NEPHROCAPS) 1 mg capsule 2/5/2020 at Unknown time Spouse/Significant Other No Yes   Sig: Take 1 capsule by mouth daily with dinner   cholecalciferol (VITAMIN D3) 1,000 units tablet 2/5/2020 at Unknown time Spouse/Significant Other Yes Yes   Sig: Take 1,000 Units by mouth daily   colchicine (COLCRYS) 0 6 mg tablet 2/5/2020 at Unknown time  No Yes   Sig: Take 1 tablet (0 6 mg total) by mouth daily   insulin detemir (LEVEMIR) 100 units/mL subcutaneous injection 2/4/2020 at Unknown time Spouse/Significant Other Yes Yes   Sig: Inject 10 Units under the skin daily at bedtime   insulin lispro (HumaLOG) 100 units/mL injection 2/5/2020 at Unknown time Spouse/Significant Other Yes Yes   Sig: Inject under the skin 3 (three) times a day before meals   methenamine hippurate (HIPREX) 1 g tablet 2/5/2020 at Unknown time Spouse/Significant Other Yes Yes   Sig: Take 1 g by mouth 3 (three) times a day   oxyCODONE-acetaminophen (PERCOCET) 5-325 mg per tablet Not Taking at Unknown time Spouse/Significant Other Yes No   Sig: Take 1 tablet by mouth every 4 (four) hours as needed for moderate pain   pantoprazole (PROTONIX) 40 mg tablet 2/5/2020 at Unknown time Spouse/Significant Other No Yes   Sig: Take 1 tablet (40 mg total) by mouth daily in the early morning   saccharomyces boulardii (FLORASTOR) 250 mg capsule 2/5/2020 at Unknown time  No Yes   Sig: Take 1 capsule (250 mg total) by mouth 2 (two) times a day   simethicone (MYLICON) 80 mg chewable tablet 2/5/2020 at Unknown time Spouse/Significant Other No Yes   Sig: Chew 1 tablet (80 mg total) every 6 (six) hours as needed for flatulence   sodium bicarbonate 650 mg tablet 2/5/2020 at Unknown time Spouse/Significant Other No Yes   Sig: Take 1 tablet (650 mg total) by mouth 2 (two) times daily after meals   sodium chloride, PF, 0 9 %   No No   Sig: Infuse 10 mL into a venous catheter 2 (two) times a week To flush gallbladder catheter   tamsulosin (FLOMAX) 0 4 mg 2/5/2020 at Unknown time Spouse/Significant Other No Yes   Sig: Take 1 capsule (0 4 mg total) by mouth daily with dinner      Facility-Administered Medications: None     No Known Allergies    Objective   First Vitals:   Blood Pressure: 134/61 (02/05/20 1133)  Pulse: 74 (02/05/20 1133)  Temperature: (!) 97 4 °F (36 3 °C) (02/05/20 1133)  Temp Source: Temporal (02/05/20 1133)  Respirations: 18 (02/05/20 1133)  Height: 5' 8" (172 7 cm) (02/05/20 1100)  Weight - Scale: 67 8 kg (149 lb 9 3 oz) (02/05/20 1100)  SpO2: 97 % (02/05/20 1133)    Current Vitals:   Blood Pressure: 119/92 (02/05/20 1549)  Pulse: 74 (02/05/20 1549)  Temperature: 98 °F (36 7 °C) (02/05/20 1549)  Temp Source: Temporal (02/05/20 1549)  Respirations: 18 (02/05/20 1549)  Height: 5' 8" (172 7 cm) (02/05/20 1100)  Weight - Scale: 67 8 kg (149 lb 9 3 oz) (02/05/20 1100)  SpO2: 99 % (02/05/20 1549)      Intake/Output Summary (Last 24 hours) at 2/5/2020 1846  Last data filed at 2/5/2020 1700  Gross per 24 hour   Intake 240 ml   Output --   Net 240 ml Invasive Devices     Peripheral Intravenous Line            Peripheral IV 02/05/20 Left Forearm less than 1 day          Drain            Urethral Catheter 18 Fr  43 days    Biliary Tube 12 Fr  RUQ 2 days                Physical Exam   Constitutional: He is oriented to person, place, and time  Cachectic white male in no acute distress   HENT:   Head: Normocephalic  Eyes: No scleral icterus  Neck: Neck supple  Cardiovascular: Normal rate  Pulmonary/Chest: Effort normal and breath sounds normal    Abdominal:   Mildly distended with palpable liver edge  Mildly tender in the right upper quadrant with turbid bile draining from percutaneous cholecystostomy tube   Genitourinary:   Genitourinary Comments: The indwelling Haile catheter with turbid yellow urine   Musculoskeletal:   Mild pedal edema   Neurological: He is oriented to person, place, and time  Skin: Skin is warm and dry  Psychiatric: He has a normal mood and affect  The patient wishes to be a full code despite appearing to comprehend that even a successful resuscitation may result in permanent disability, and that even if cholecystectomy is successful his long-term outlook is bleak       Lab Results: I have personally reviewed pertinent lab results  Admission lab work is pending, previous lab work has been reviewed  Imaging: I have personally reviewed pertinent reports  and I have personally reviewed pertinent films in PACS  EKG, Pathology, and Other Studies: I have personally reviewed pertinent reports  Code Status: Level 1 - Full Code  Advance Directive and Living Will:      Power of :    POLST:      Counseling / Coordination of Care  Total floor / unit time spent today 20 minutes  Greater than 50% of total time was spent with the patient and / or family counseling and / or coordination of care  A description of the counseling / coordination of care: Including discussion with Internal Medicine, and nursing

## 2020-02-05 NOTE — CONSULTS
Consult- Junior Canas 1940, [de-identified] y o  male MRN: [id number]    Unit/Bed#: -01 Encounter: [id number]    Primary Care Provider: Alejo Sheikh DO   Date and time admitted to hospital: 2/5/2020 10:50 AM      Inpatient consult to Internal Medicine  Consult performed by: Rei Tena MD  Consult ordered by: Jailyn Mane MD          * Acute cholecystitis  Assessment & Plan  · Continue IV antibiotics  · Pain control as needed  · Currently on IV fluids  · Continue further management per surgical team    Neuroendocrine tumor  Assessment & Plan  · Patient currently on outpatient treatment with Oncology  · Continue outpatient follow-up  · Supportive care    CKD (chronic kidney disease) stage 3, GFR 30-59 ml/min (HCC)  Assessment & Plan  · Will follow-up labs and continue supportive care  · Avoid any Nephro toxic meds    Chronic indwelling Haile catheter  Assessment & Plan  · Continue Flomax    Pressure injury of right heel, unstageable (HCC)  Assessment & Plan  · Present on admission  · Continue local wound care  · Frequent repositioning    Essential hypertension  Assessment & Plan  · BP currently stable  · Continue Norvasc    Type 2 diabetes 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, with long-term current use of insulin ([facility])  Assessment & Plan  · Insulin sliding scale  · Will monitor fingersticks and add Lantus q h s  Based on results          Recommendations for Discharge:  · Per Primary Service    History of Present Illness:  Junior Canas is a [de-identified] y o  male who is originally admitted to the surgical service due to cholecystitis  We are consulted for medical management  Patient states that he has been having ongoing gallbladder issues for a few months now  Over the last couple days symptoms have worsened and patient now with nausea/vomiting  Decreased appetite  Denies any fever or chills    Patient was evaluated by surgery who recommended conservative management however patient and family would like to pursue surgical intervention  Patient is admitted and tentatively plan for surgery on Friday to remove gallbladder  Review of Systems:  Review of Systems   Constitutional: Positive for appetite change and fatigue  Negative for chills and fever  Respiratory: Negative for shortness of breath  Cardiovascular: Negative for chest pain  Gastrointestinal: Positive for abdominal pain, nausea and vomiting  Neurological: Positive for weakness  Past Medical and Surgical History:   Past Medical History:   Diagnosis Date    Acute pancreatitis without infection or necrosis 9/26/2019    Anemia of chronic renal failure     BPH (benign prostatic hyperplasia)     Cancer (HCC)     liver cancer    Cardiac disease     Chronic kidney disease, stage 3 (HCC)     Coronary artery disease     Diabetes mellitus (HCC)     DJD (degenerative joint disease)     Essential hypertension     Gait disturbance     Generalized weakness     GERD (gastroesophageal reflux disease)     Gout     History of transfusion     Hydronephrosis     Hyperlipidemia     Hypertension     [facility]     Pressure injury of skin     Proteinuria     Renal disorder     Retention of urine     Thrombophlebitis migrans     Type 2 diabetes mellitus (HCC)     Type 2 diabetes 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, with long-term current use of insulin ([facility] ) 1/23/2019       Past Surgical History:   Procedure Laterality Date    CORONARY ANGIOPLASTY WITH STENT PLACEMENT      IR IMAGE GUIDED BIOPSY/ASPIRATION LIVER  1/24/2019    IR TUBE PLACEMENT ROQUE  9/6/2019       Meds/Allergies:  all medications and allergies reviewed    Allergies: No Known Allergies    Social History:     Marital Status:      Substance Use History:   Social History     Substance and Sexual Activity   Alcohol Use Never    Frequency: Never     Social History     Tobacco Use   Smoking Status Never Smoker   Smokeless Tobacco Never Used     Social History     Substance and Sexual Activity   Drug Use Never       Family History:  I have reviewed the patients family history    Physical Exam:   Vitals:   Blood Pressure: 134/61 (02/05/20 1133)  Pulse: 74 (02/05/20 1133)  Temperature: (!) 97 4 °F (36 3 °C) (02/05/20 1133)  Temp Source: Temporal (02/05/20 1133)  Respirations: 18 (02/05/20 1133)  Height: 5' 8" (172 7 cm) (02/05/20 1100)  Weight - Scale: 67 8 kg (149 lb 9 3 oz) (02/05/20 1100)  SpO2: 97 % (02/05/20 1133)    Physical Exam   Constitutional: No distress  Frail elderly    HENT:   Head: Normocephalic and atraumatic  Eyes: Conjunctivae and EOM are normal    Neck: Normal range of motion  Neck supple  Cardiovascular: Normal rate and regular rhythm  Pulmonary/Chest: Effort normal  No respiratory distress  Abdominal: Soft  There is tenderness  Cholecystostomy tube noted   Genitourinary:   Genitourinary Comments: Haile catheter in place   Musculoskeletal: He exhibits no edema  Generalized weakness   Neurological: He is alert  No cranial nerve deficit  Skin: Skin is warm and dry  Psychiatric: He has a normal mood and affect  His behavior is normal         Additional Data:   Lab Results: I have personally reviewed pertinent reports  Invalid input(s): LABALBU          Lab Results   Component Value Date/Time    HGBA1C 8 1 (H) 06/14/2019 09:05 AM    HGBA1C 6 9 (H) 04/05/2019 06:09 AM    HGBA1C 7 4 (H) 01/27/2019 08:15 PM           Imaging: I have personally reviewed pertinent reports  No orders to display       ** Please Note: This note has been constructed using a voice recognition system   **

## 2020-02-05 NOTE — ASSESSMENT & PLAN NOTE
· Continue IV antibiotics  · Pain control as needed  · Currently on IV fluids  · Continue further management per surgical team

## 2020-02-05 NOTE — ASSESSMENT & PLAN NOTE
The patient presents in follow-up  He recently was seen by interventional Radiology with recurrence of acute calculous cholecystitis caused by a blocked percutaneous cholecystostomy tube  The patient presents in considerable pain, and his symptoms appear to warrant direct admission to the hospital     A took the opportunity to have a tani discussion with the patient, his wife and son regarding his alternatives at this point  The patient wishes to proceed with laparoscopic cholecystectomy a despite the risks of conversion to an open procedure, and the potential for intraoperative catastrophe from bleeding, particularly with his extensive tumor burden  The patient also appears to be somewhat in denial regarding his request to be resuscitated should he have an intraoperative catastrophe, despite the risk of death or permanent disability as an outcome  All parties, the patient, his wife, and his son, wished to proceed with surgery  He also wishes to be a full code  In any case he is in considerable pain and warrants admission to the hospital for intravenous antibiotics, will take the opportunity to have Internal Medicine evaluate the patient in preparing him for potential surgery on Friday February 7, 2020

## 2020-02-05 NOTE — TELEPHONE ENCOUNTER
Patient's SO stated that he always gets a bill for his co-pay 
Patient/Caregiver provided printed discharge information.

## 2020-02-05 NOTE — PROGRESS NOTES
Assessment/Plan:    Acute acalculous cholecystitis  The patient presents in follow-up  He recently was seen by interventional Radiology with recurrence of acute calculous cholecystitis caused by a blocked percutaneous cholecystostomy tube  The patient presents in considerable pain, and his symptoms appear to warrant direct admission to the hospital     A took the opportunity to have a tani discussion with the patient, his wife and son regarding his alternatives at this point  The patient wishes to proceed with laparoscopic cholecystectomy a despite the risks of conversion to an open procedure, and the potential for intraoperative catastrophe from bleeding, particularly with his extensive tumor burden  The patient also appears to be somewhat in denial regarding his request to be resuscitated should he have an intraoperative catastrophe, despite the risk of death or permanent disability as an outcome  All parties, the patient, his wife, and his son, wished to proceed with surgery  He also wishes to be a full code  In any case he is in considerable pain and warrants admission to the hospital for intravenous antibiotics, will take the opportunity to have Internal Medicine evaluate the patient in preparing him for potential surgery on Friday February 7, 2020  Diagnoses and all orders for this visit:    Acute acalculous cholecystitis  -     Transfer to other facility          Subjective:      Patient ID: Junior Canas is a [de-identified] y o  male  The patient is well known to me, he is an 19-year-old white male with a history of diabetes with renal insufficiency, prostatism with an indwelling Haile catheter, neuroendocrine tumor metastatic to the liver, maintained on somatostatin analogs who also has history of acute cholecystitis and pancreatitis  The patient has an indwelling percutaneous cholecystostomy tube which has dysfunction, and he currently has recurrent symptoms of acute cholecystitis    The patient has unequivocally expressed his desire to have a cholecystectomy, despite a tani discussion of the risks including uncontrollable hemorrhage from his liver tumor  The following portions of the patient's history were reviewed and updated as appropriate: allergies, current medications, past family history, past medical history, past social history, past surgical history and problem list     Review of Systems   Constitutional: Positive for appetite change, chills and fever  HENT: Negative for trouble swallowing  Respiratory: Positive for shortness of breath  Cardiovascular: Negative for chest pain  Gastrointestinal: Positive for abdominal distention, abdominal pain and constipation  Genitourinary: Positive for decreased urine volume and difficulty urinating  Musculoskeletal: Positive for arthralgias  Skin: Negative for color change  Neurological: Positive for weakness  Psychiatric/Behavioral: The patient is nervous/anxious  All other systems reviewed and are negative  Objective:      /60 (BP Location: Left arm, Patient Position: Sitting, Cuff Size: Standard)   Pulse 81   Temp 98 1 °F (36 7 °C) (Tympanic)   Resp 18   Ht 5' 8" (1 727 m)   Wt 71 7 kg (158 lb)   BMI 24 02 kg/m²          Physical Exam   Constitutional: He is oriented to person, place, and time  Cachectic, elderly white male but in no acute distress   HENT:   Head: Normocephalic  Eyes: No scleral icterus  Neck: Neck supple  Cardiovascular: Normal rate  Pulmonary/Chest: Effort normal    Abdominal: He exhibits no distension  There is tenderness  There is no guarding  With palpable liver edge    Percutaneous cholecystostomy tube with bile and sludge   Genitourinary:   Genitourinary Comments: Indwelling Haile catheter with turbid urine   Musculoskeletal: He exhibits no deformity  Neurological: He is alert and oriented to person, place, and time  Skin: Skin is warm and dry     Psychiatric: Quite insistent on being a full code despite diagnosis of metastatic liver disease

## 2020-02-06 LAB
ABO GROUP BLD: NORMAL
BLD GP AB SCN SERPL QL: NEGATIVE
GLUCOSE SERPL-MCNC: 238 MG/DL (ref 65–140)
GLUCOSE SERPL-MCNC: 275 MG/DL (ref 65–140)
GLUCOSE SERPL-MCNC: 281 MG/DL (ref 65–140)
GLUCOSE SERPL-MCNC: 328 MG/DL (ref 65–140)
RH BLD: POSITIVE
SPECIMEN EXPIRATION DATE: NORMAL

## 2020-02-06 PROCEDURE — 99232 SBSQ HOSP IP/OBS MODERATE 35: CPT | Performed by: SPECIALIST

## 2020-02-06 PROCEDURE — 97163 PT EVAL HIGH COMPLEX 45 MIN: CPT

## 2020-02-06 PROCEDURE — 82948 REAGENT STRIP/BLOOD GLUCOSE: CPT

## 2020-02-06 PROCEDURE — 86850 RBC ANTIBODY SCREEN: CPT | Performed by: SPECIALIST

## 2020-02-06 PROCEDURE — 86900 BLOOD TYPING SEROLOGIC ABO: CPT | Performed by: SPECIALIST

## 2020-02-06 PROCEDURE — 86901 BLOOD TYPING SEROLOGIC RH(D): CPT | Performed by: SPECIALIST

## 2020-02-06 RX ADMIN — PIPERACILLIN SODIUM AND TAZOBACTAM SODIUM 3.38 G: 3; .375 INJECTION, POWDER, LYOPHILIZED, FOR SOLUTION INTRAVENOUS at 12:23

## 2020-02-06 RX ADMIN — INSULIN LISPRO 2 UNITS: 100 INJECTION, SOLUTION INTRAVENOUS; SUBCUTANEOUS at 08:00

## 2020-02-06 RX ADMIN — PANTOPRAZOLE SODIUM 40 MG: 40 TABLET, DELAYED RELEASE ORAL at 06:52

## 2020-02-06 RX ADMIN — PIPERACILLIN SODIUM AND TAZOBACTAM SODIUM 3.38 G: 3; .375 INJECTION, POWDER, LYOPHILIZED, FOR SOLUTION INTRAVENOUS at 17:47

## 2020-02-06 RX ADMIN — COLCHICINE 0.6 MG: 0.6 TABLET, FILM COATED ORAL at 08:53

## 2020-02-06 RX ADMIN — Medication 1 CAPSULE: at 17:44

## 2020-02-06 RX ADMIN — Medication 250 MG: at 08:53

## 2020-02-06 RX ADMIN — AMLODIPINE BESYLATE 10 MG: 5 TABLET ORAL at 08:53

## 2020-02-06 RX ADMIN — HEPARIN SODIUM 5000 UNITS: 5000 INJECTION INTRAVENOUS; SUBCUTANEOUS at 06:52

## 2020-02-06 RX ADMIN — HEPARIN SODIUM 5000 UNITS: 5000 INJECTION INTRAVENOUS; SUBCUTANEOUS at 22:14

## 2020-02-06 RX ADMIN — SODIUM BICARBONATE 650 MG: 650 TABLET ORAL at 17:44

## 2020-02-06 RX ADMIN — SODIUM CHLORIDE 100 ML/HR: 9 INJECTION, SOLUTION INTRAVENOUS at 12:04

## 2020-02-06 RX ADMIN — VITAMIN D, TAB 1000IU (100/BT) 1000 UNITS: 25 TAB at 08:53

## 2020-02-06 RX ADMIN — Medication 250 MG: at 17:44

## 2020-02-06 RX ADMIN — SODIUM CHLORIDE 100 ML/HR: 9 INJECTION, SOLUTION INTRAVENOUS at 01:26

## 2020-02-06 RX ADMIN — TAMSULOSIN HYDROCHLORIDE 0.4 MG: 0.4 CAPSULE ORAL at 17:44

## 2020-02-06 RX ADMIN — PIPERACILLIN SODIUM AND TAZOBACTAM SODIUM 3.38 G: 3; .375 INJECTION, POWDER, LYOPHILIZED, FOR SOLUTION INTRAVENOUS at 06:52

## 2020-02-06 RX ADMIN — HEPARIN SODIUM 5000 UNITS: 5000 INJECTION INTRAVENOUS; SUBCUTANEOUS at 14:02

## 2020-02-06 RX ADMIN — PIPERACILLIN SODIUM AND TAZOBACTAM SODIUM 3.38 G: 3; .375 INJECTION, POWDER, LYOPHILIZED, FOR SOLUTION INTRAVENOUS at 01:26

## 2020-02-06 RX ADMIN — SODIUM BICARBONATE 650 MG: 650 TABLET ORAL at 08:53

## 2020-02-06 RX ADMIN — INSULIN LISPRO 2 UNITS: 100 INJECTION, SOLUTION INTRAVENOUS; SUBCUTANEOUS at 22:13

## 2020-02-06 RX ADMIN — INSULIN LISPRO 3 UNITS: 100 INJECTION, SOLUTION INTRAVENOUS; SUBCUTANEOUS at 17:45

## 2020-02-06 RX ADMIN — SODIUM CHLORIDE 10 ML: 9 INJECTION, SOLUTION INTRAMUSCULAR; INTRAVENOUS; SUBCUTANEOUS at 10:29

## 2020-02-06 RX ADMIN — INSULIN LISPRO 3 UNITS: 100 INJECTION, SOLUTION INTRAVENOUS; SUBCUTANEOUS at 12:01

## 2020-02-06 NOTE — PLAN OF CARE
Problem: PHYSICAL THERAPY ADULT  Goal: Performs mobility at highest level of function for planned discharge setting  See evaluation for individualized goals  Description  Treatment/Interventions: Functional transfer training, LE strengthening/ROM, Elevations, Therapeutic exercise, Endurance training, Patient/family training, Equipment eval/education, Bed mobility, Gait training, Spoke to nursing, Spoke to case management  Equipment Recommended: (continue RW use)       See flowsheet documentation for full assessment, interventions and recommendations  Note:   Prognosis: Fair  Problem List: Decreased strength, Decreased endurance, Impaired balance, Decreased mobility, Pain  Assessment: Pt is [de-identified] y o  male seen for PT evaluation on 2/6/2020 s/p admit to [address] [place] [address] on 2/5/2020 w/ Acute cholecystitis  PT consulted to assess pt's functional mobility and d/c needs  Order placed for PT eval and tx, w/ up as tolerated order  Performed at least 2 patient identifiers during session: Name and wristband  Comorbidities affecting pt's physical performance at time of assessment include: neuroendocrine tumor, CKD stage 3, chronic indwelling garcia catheter, essential HTN, DM type 2  PTA, pt was independent w/ all functional mobility w/ RW, has 2 BETH, lives w/ girlfriend in 1 level house and unemployed  Personal factors affecting pt at time of IE include: ambulating w/ assistive device, stairs to enter home, inability to navigate level surfaces w/o external assistance, unable to perform dynamic tasks in community, hearing impairments, decreased initiation and engagement, unable to perform physical activity, limited insight into impairments, inability to perform IADLs and inability to perform ADLs   Please find objective findings from PT assessment regarding body systems outlined above with impairments and limitations including weakness, impaired balance, decreased endurance, pain, decreased activity tolerance, decreased functional mobility tolerance and fall risk, as well as mobility assessment (need for cueing for mobility technique)  The following objective measures performed on IE also reveal limitations: Barthel Index: 35/100  Pt's clinical presentation is currently unstable/unpredictable seen in pt's presentation of need for input for task focus and mobility technique and ongoing medical assessment  Pt to benefit from continued PT tx to address deficits as defined above and maximize level of functional independent mobility and consistency  From PT/mobility standpoint, recommendation at time of d/c would be Home PT with family support pending progress in order to facilitate return to PLOF  Barriers to Discharge: Inaccessible home environment     Recommendation: Home PT, Home with family support     PT - OK to Discharge: No    See flowsheet documentation for full assessment

## 2020-02-06 NOTE — NURSING NOTE
Patient out of bed with assist times 1-2 stand pivot  Able to make needs known  Reports pain in right lower back by biliary drain  Redness noted around site and dressing changed  Patient does not wish to take anything for pain at this time  Remains continant of urine  Garcia cath care completed and garcia draining cloudy yellow urine  Will continue to monitor and follow plan of care

## 2020-02-06 NOTE — SOCIAL WORK
Chart reviewed by cm, pt is a 30 day readmission, pt was instructed to come into the hospital to have his gall bladder removed, pt has a percutaneous cholecystostomy, pt came to the hospital due to pain and pt has had problems with a blocked tube, pt is to have surgery tomorrow , cm will continue to follow and reassess d/c plan and needs, pt lives with is significant other in a 1 story home with basement steps that he does not use, 2 steps into the home from the garage, he has a walker & walker with a seat, w/c, cane, glucometer, commode shower chair, rx plan at [facility], pt is active with [facility], SO cooks, cleans, helps him dress at times, drives,shops,laundry, pt denies smoking and any alcohol use, no d/c today as discussed at care coordination rounds today, pt is for surgery tomorrow, will need to reassess d/ plan, Patient/caregiver received discharge checklist   Content reviewed  Patient/caregiver encouraged to participate in discharge plan of care prior to discharge home  CM reviewed d/c planning process including the following: identifying help at home, patient preference for d/c planning needs, availability of treatment team to discuss questions or concerns patient and/or family may have regarding understanding medications and recognizing signs and symptoms once discharged  CM also encouraged patient to follow up with all recommended appointments after discharge  Patient advised of importance for patient and family to participate in managing patients medical well being

## 2020-02-06 NOTE — UTILIZATION REVIEW
Initial Clinical Review    Admission: Date/Time/Statement: Admission Orders (From admission, onward)     Ordered        02/05/20 1206  Inpatient Admission  Once                   Orders Placed This Encounter   Procedures    Inpatient Admission     Standing Status:   Standing     Number of Occurrences:   1     Order Specific Question:   Admitting Physician     Answer:   [place] Melanie [[id number]]     Order Specific Question:   Level of Care     Answer:   Med Surg [[id number]]     Order Specific Question:   Estimated length of stay     Answer:   More than 2 Midnights     Order Specific Question:   Certification     Answer:   I certify that inpatient services are medically necessary for this patient for a duration of greater than two midnights  See H&P and MD Progress Notes for additional information about the patient's course of treatment  Assessment/Plan: [de-identified]year old male directly admitted to inpatient for IV antibiotics in anticipation for lap monet, possible open procedure  H/O diabetes and 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  Developed acute cholecystitis, had percutaneous cholecystomstomy tube placed with unsuccessful attempts to cap it, having recurrent cholecystitis, chronic pain , pancreatitis  He has had ongoing gallbladder issues for a few months worsening nausea, vomiting, pain over the past few days, affecting his quality of life  Patient requesting cholecystectomy understanding the extraordinarily high risk  Admitted to inpatient for preoperative IV abx and preop testing for high risk lap monet possible open on 2/7  He has abdominal pain, diarrhea, nausea, abdomen is mildly distended with palpable liver edge with tender right upper quadrant with turbid bile draining from percutaneous cholecystostomy tube  Chronic indwelling garcia with turbid yellow urine  Mild pedal edema  Prn pain meds  2/6 UPdate: Having pain at percutanous cholecystostomy tube site  Afebrile  GIve iv fluids, AM labs  IV abx   TOlerating po diet without difficulty        Temperature Pulse Respirations Blood Pressure SpO2   02/05/20 1133 02/05/20 1133 02/05/20 1133 02/05/20 1133 02/05/20 1133   (!) 97 4 °F (36 3 °C) 74 18 134/61 97 %      Temp Source Heart Rate Source Patient Position - Orthostatic VS BP Location FiO2 (%)   02/05/20 1133 02/05/20 1133 02/05/20 1133 02/05/20 1133 --   Temporal Monitor Lying Right arm       Pain Score       02/05/20 1100       6        Wt Readings from Last 1 Encounters:   02/05/20 67 8 kg (149 lb 9 3 oz)     Additional Vital Signs:   Date/Time  Temp  Pulse  Resp  BP  SpO2  O2 Device  Patient Position - Orthostatic VS   02/06/20 0635  97 3 °F (36 3 °C)Abnormal   70  20  153/69  95 %  None (Room air)  Lying   02/06/20 0035  98 9 °F (37 2 °C)  63  18  142/62  98 %  None (Room air)  Lying   02/05/20 2033  --  --  --  --  --  None (Room air)  --   02/05/20 1549  98 °F (36 7 °C)  74  18  119/92           Pertinent Labs/Diagnostic Test Results:     Results from last 7 days   Lab Units 02/05/20 1952   WBC Thousand/uL 12 20*   HEMOGLOBIN g/dL 10 5*   HEMATOCRIT % 32 8*   PLATELETS Thousands/uL 201   NEUTROS ABS Thousands/µL 9 90*         Results from last 7 days   Lab Units 02/05/20 1951   SODIUM mmol/L 130*   POTASSIUM mmol/L 3 9   CHLORIDE mmol/L 98   CO2 mmol/L 19*   ANION GAP mmol/L 13   BUN mg/dL 50*   CREATININE mg/dL 3 07*   EGFR ml/min/1 73sq m 18   CALCIUM mg/dL 8 4*     Results from last 7 days   Lab Units 02/05/20 1951   AST U/L 7*   ALT U/L 10   ALK PHOS U/L 101   TOTAL PROTEIN g/dL 6 2*   ALBUMIN g/dL 3 3*   TOTAL BILIRUBIN mg/dL 0 30     Results from last 7 days   Lab Units 02/06/20  1123 02/06/20  0658 02/05/20 2038   POC GLUCOSE mg/dl 281* 238* 436*     Results from last 7 days   Lab Units 02/05/20 1951   GLUCOSE RANDOM mg/dL 474*             BETA-HYDROXYBUTYRATE   Date Value Ref Range Status   02/05/2019 0 11 0 02 - 0 27 mmol/L Final      Results from last 7 days   Lab Units 02/05/20  1952   PROTIME seconds 14 1*   INR  1 21     Results from last 7 days   Lab Units 02/03/20  1427   GRAM STAIN RESULT  3+ Polys*  3+ Gram positive cocci in pairs and chains*   BODY FLUID CULTURE, STERILE  Few Colonies of Gram Negative Marin*  4+ Growth of Enterococcus faecalis*  2+ Growth of Staphylococcus aureus*       Past Medical History:   Diagnosis Date    Acute pancreatitis without infection or necrosis 9/26/2019    Anemia of chronic renal failure     BPH (benign prostatic hyperplasia)     Cancer (HCC)     liver cancer    Cardiac disease     Chronic kidney disease, stage 3 (HCC)     Coronary artery disease     Diabetes mellitus ([facility] )     DJD (degenerative joint disease)     Essential hypertension     Gait disturbance     Generalized weakness     GERD (gastroesophageal reflux disease)     Gout     History of transfusion     Hydronephrosis     Hyperlipidemia     Hypertension     Liver cancer ([facility] )     Pressure injury of skin     Proteinuria     Renal disorder     Retention of urine     Thrombophlebitis migrans     Type 2 diabetes mellitus (HCC)     Type 2 diabetes 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, with long-term current use of insulin ([facility] ) 1/23/2019       Admitting Diagnosis: Acute acalculous cholecystitis  Age/Sex: [de-identified] y o  male  Admission Orders:  SCDs  UP as tolerated  CBC, CMP  Scheduled Medications:    Medications:  amLODIPine 10 mg Oral Daily   b complex-vitamin C-folic acid 1 capsule Oral Daily With Dinner   cholecalciferol 1,000 Units Oral Daily   colchicine 0 6 mg Oral Daily   heparin (porcine) 5,000 Units Subcutaneous Q8H [facility]   insulin lispro 1-5 Units Subcutaneous TID AC   insulin lispro 1-5 Units Subcutaneous HS   pantoprazole 40 mg Oral Early Morning   piperacillin-tazobactam 3 375 g Intravenous Q6H   saccharomyces boulardii 250 mg Oral BID   sodium bicarbonate 650 mg Oral BID after meals   sodium chloride (PF) 10 mL Intravenous Once per day on Mon Thu   tamsulosin 0 4 mg Oral Daily With Dinner     Continuous IV Infusions:    sodium chloride 100 mL/hr Intravenous Continuous     PRN Meds:    morphine injection 2 mg Intravenous Q2H PRN   ondansetron 4 mg Intravenous Q6H PRN   oxyCODONE-acetaminophen 1 tablet Oral Q4H PRN x1   simethicone 80 mg Oral Q6H PRN       IP CONSULT TO INTERNAL MEDICINE  IP CONSULT TO INTERNAL MEDICINE    Network Utilization Review Department  [email or web address]  ATTENTION: Please call with any questions or concerns to [phone] and carefully listen to the prompts so that you are directed to the right person  All voicemails are confidential   [employer] all requests for admission clinical reviews, approved or denied determinations and any other requests to dedicated fax number below belonging to the campus where the patient is receiving treatment   List of dedicated fax numbers for the Facilities:  [address] DENIALS (Administrative/Medical Necessity) [phone]   [address] (Maternity/NICU/Pediatrics) [phone]   Alexandr Phillips [phone]   Ling Ba [phone]   [facility] [phone]   John Mcmahon [phone]   [address] [phone]   [facility]  [phone]   [address]5 [facility] [address]  2401 [facility] [address] [facility] [phone]

## 2020-02-06 NOTE — PHYSICAL THERAPY NOTE
Physical Therapy Evaluation     Patient's Name: Arturo Ortiz    Admitting Diagnosis  Acute acalculous cholecystitis    Problem List  Patient Active Problem List   Diagnosis    Weakness generalized    Type 2 diabetes 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, with long-term current use of insulin ([address] )    Essential hypertension    Mixed hyperlipidemia    Cardiac disease    Hydroureter    Metabolic acidosis    Iron deficiency anemia secondary to inadequate dietary iron intake    Accelerated hypertension    Liver mass    Neuroendocrine tumor    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)    Pressure injury of right heel, unstageable ([address] )    Atherosclerosis of artery of extremity with ulceration (HCC)    Carcinoid syndrome (HCC)    Chronic indwelling Haile catheter    Moderate protein-calorie malnutrition ([address] )    Biliary sludge    Malignant neuroendocrine neoplasm ([address] )    Urinary retention    Cholecystostomy tube dysfunction    Acute pancreatitis without infection or necrosis    UTI due to extended-spectrum beta lactamase (ESBL) producing Escherichia coli    CKD (chronic kidney disease) stage 3, GFR 30-59 ml/min (HCC)    Anemia    Acute cholecystitis    Shortness of breath    Goals of care, counseling/discussion       Past Medical History  Past Medical History:   Diagnosis Date    Acute pancreatitis without infection or necrosis 9/26/2019    Anemia of chronic renal failure     BPH (benign prostatic hyperplasia)     Cancer (HCC)     liver cancer    Cardiac disease     Chronic kidney disease, stage 3 ([address] )     Coronary artery disease     Diabetes mellitus ([address] )     DJD (degenerative joint disease)     Essential hypertension     Gait disturbance     Generalized weakness     GERD (gastroesophageal reflux disease)     Gout     History of transfusion     Hydronephrosis     Hyperlipidemia     Hypertension     Liver cancer ([address] )     Pressure injury of skin     Proteinuria     Renal disorder     Retention of urine     Thrombophlebitis migrans     Type 2 diabetes mellitus (HCC)     Type 2 diabetes 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, with long-term current use of insulin ([facility] ) 1/23/2019       Past Surgical History  Past Surgical History:   Procedure Laterality Date    CORONARY ANGIOPLASTY WITH STENT PLACEMENT      IR IMAGE GUIDED BIOPSY/ASPIRATION LIVER  1/24/2019    IR TUBE PLACEMENT ROQUE  9/6/2019 02/06/20 1135   Note Type   Note type Eval/Treat   Pain Assessment   Pain Assessment 0-10   Pain Score 5   Pain Location Back   Pain Orientation Right   Home Living   Type of [address] One level;Performs ADLs on one level; Able to live on main level with bedroom/bathroom;Stairs to enter with rails  (2 BETH)   Bathroom Shower/Tub Walk-in shower   Bathroom Toilet Raised   Bathroom Equipment Grab bars in shower; Shower chair;Commode; Toilet raiser   Bathroom Accessibility Accessible   Home Equipment Walker  (pt uses RW at baseline)   Prior Function   Level of [place] Independent with ADLs and functional mobility; Needs assistance with IADLs   Lives With Significant other   Receives Help From Family;Home health   ADL Assistance Independent   IADLs Needs assistance   Falls in the last 6 months 0   Restrictions/Precautions   Weight Bearing Precautions Per Order No   Other Precautions Contact/isolation; Chair Alarm; Bed Alarm;Multiple lines; Fall Risk;Pain;Hard of hearing   General   Family/Caregiver Present Yes   Cognition   Arousal/Participation Alert   Orientation Level Oriented X4   Memory Decreased recall of recent events;Decreased recall of precautions   Following Commands Follows one step commands without difficulty   Comments pt agreeable to PT session   RLE Assessment   RLE Assessment WFL  (grossly 4-/5 throughout)   LLE Assessment   LLE Assessment WFL  (grossly 4-/5 throughout)   Coordination   Movements are Fluid and Coordinated 0 Sensation WFL   Bed Mobility   Supine to Sit 4  Minimal assistance   Additional items Assist x 1;HOB elevated; Bedrails; Increased time required;Verbal cues   Transfers   Sit to Stand 4  Minimal assistance  (CGA)   Additional items Assist x 1; Increased time required;Verbal cues   Stand to Sit 4  Minimal assistance   Additional items Assist x 1; Armrests; Verbal cues   Ambulation/Elevation   Gait pattern Improper Weight shift;Decreased foot clearance;Shuffling; Short stride   Gait Assistance 4  Minimal assist   Additional items Assist x 1;Verbal cues; Tactile cues   Assistive Device Rolling walker   Distance 5 ft from bed>recliner   Stair Management Assistance Not tested   Balance   Static Sitting Good   Dynamic Sitting Fair   Static Standing Fair -   Dynamic Standing Poor +   Ambulatory Poor +   Endurance Deficit   Endurance Deficit Yes   Activity Tolerance   Activity Tolerance Patient limited by fatigue   Nurse Made Aware Yes, RN Estefani Monroy made aware of PT eval outcomes/recs   Assessment   Prognosis Fair   Problem List Decreased strength;Decreased endurance; Impaired balance;Decreased mobility;Pain   Assessment Pt is [de-identified] y o  male seen for PT evaluation on 2/6/2020 s/p admit to [address] [place] [address] on 2/5/2020 w/ Acute cholecystitis  PT consulted to assess pt's functional mobility and d/c needs  Order placed for PT eval and tx, w/ up as tolerated order  Performed at least 2 patient identifiers during session: Name and wristband  Comorbidities affecting pt's physical performance at time of assessment include: neuroendocrine tumor, CKD stage 3, chronic indwelling garcia catheter, essential HTN, DM type 2  PTA, pt was independent w/ all functional mobility w/ RW, has 2 BETH, lives w/ girlfriend in 1 level house and unemployed   Personal factors affecting pt at time of IE include: ambulating w/ assistive device, stairs to enter home, inability to navigate level surfaces w/o external assistance, unable to perform dynamic tasks in community, hearing impairments, decreased initiation and engagement, unable to perform physical activity, limited insight into impairments, inability to perform IADLs and inability to perform ADLs  Please find objective findings from PT assessment regarding body systems outlined above with impairments and limitations including weakness, impaired balance, decreased endurance, pain, decreased activity tolerance, decreased functional mobility tolerance and fall risk, as well as mobility assessment (need for cueing for mobility technique)  The following objective measures performed on IE also reveal limitations: Barthel Index: 35/100  Pt's clinical presentation is currently unstable/unpredictable seen in pt's presentation of need for input for task focus and mobility technique and ongoing medical assessment  Pt to benefit from continued PT tx to address deficits as defined above and maximize level of functional independent mobility and consistency  From PT/mobility standpoint, recommendation at time of d/c would be Home PT with family support pending progress in order to facilitate return to PLOF  Barriers to Discharge Inaccessible home environment   Goals   Patient Goals "to return home"   STG Expiration Date 02/16/20   Short Term Goal #1 Pt will: Perform bed mobility tasks to Supervision to improve ease of bed mobility  Perform transfers to Supervision to improve ease of transfers  Perform ambulation with supervision and RW for 125 ft to   increase Indep in home environment  Increase dynamic standing balance to F+ to decrease fall risk  Increase BLE strength to 4+/5 to improve functional mobility  Increase OOB activity tolerance to 10 minutes without s/s of exertion to decrease fall risk  Navigate up and down 2 steps so patient can enter and exit home  PT Treatment Day 0   Plan   Treatment/Interventions Functional transfer training;LE strengthening/ROM; Elevations; Therapeutic exercise; Endurance training;Patient/family training;Equipment eval/education; Bed mobility;Gait training;Spoke to nursing;Spoke to case management   PT Frequency   (3-5x/wk)   Recommendation   Recommendation Home PT; Home with family support   Equipment Recommended   (continue RW use)   PT - OK to Discharge No   Barthel Index   Feeding 10   Bathing 0   Grooming Score 0   Dressing Score 5   Bladder Score 0   Bowels Score 5   Toilet Use Score 5   Transfers (Bed/Chair) Score 10   Mobility (Level Surface) Score 0   Stairs Score 0   Barthel Index Score 35         Logan Soulier, PT

## 2020-02-06 NOTE — PLAN OF CARE
Problem: DISCHARGE PLANNING - CARE MANAGEMENT  Goal: Discharge to post-acute care or home with appropriate resources  Description  INTERVENTIONS:  - Conduct assessment to determine patient/family and health care team treatment goals, and need for post-acute services based on payer coverage, community resources, and patient preferences, and barriers to discharge  - Address psychosocial, clinical, and financial barriers to discharge as identified in assessment in conjunction with the patient/family and health care team  - Arrange appropriate level of post-acute services according to patient's   needs and preference and payer coverage in collaboration with the physician and health care team  - Communicate with and update the patient/family, physician, and health care team regarding progress on the discharge plan  - Arrange appropriate transportation to post-acute venues    Need to reassess    2/6/2020 1809 by Jazlyn Hardy RN  Outcome: Progressing

## 2020-02-06 NOTE — UTILIZATION REVIEW
1  Certification Information  Reference Number: [id number]   Status: Pended   Message: This case requires further review  You will be contacted if additional information is needed     Review Decision Reason: Requires Medical Review

## 2020-02-06 NOTE — PROGRESS NOTES
Progress Note - General Surgery   Anton Das [de-identified] y o  male MRN: [id number]  Unit/Bed#: -01 Encounter: [id number]    Assessment:  [de-identified] y o  M w/ recurrent acute cholecystitis and neuroendocrine tumor mets to liver w/ carcinoid syndrome  -afebrile, VSS  -continues to have pain at percutaneous cholecystostomy tube  -drain 30 (yellow/green)    Plan:  IVF  OR for Lap monet, possible open on 2/7  Reg  Diet, NPO at midnight  Continue antibx  F/U AM labs  Appreciate SLIM's recs  DVT prophylaxis  PT      Subjective: Patient doing well, eager for procedure  Continues to have pain at cholecystostomy tube  No other complaints  Tolerating diet without N/V  BM yesterday, subjectively normal  Not ambulating  Denies fever, chills, CP, SOB  Objective:     Blood pressure 153/69, pulse 70, temperature (!) 97 3 °F (36 3 °C), temperature source Temporal, resp  rate 20, height 5' 8" (1 727 m), weight 67 8 kg (149 lb 9 3 oz), SpO2 95 %  ,Body mass index is 22 74 kg/m²  Intake/Output Summary (Last 24 hours) at 2/6/2020 0746  Last data filed at 2/6/2020 0700  Gross per 24 hour   Intake 690 ml   Output 1780 ml   Net -1090 ml       Invasive Devices     Peripheral Intravenous Line            Peripheral IV 02/05/20 Left Forearm less than 1 day          Drain            Urethral Catheter 18 Fr  44 days    Biliary Tube 12 Fr  RUQ 2 days                Physical Exam   Abdomen- soft, tender in RUQ, non distended  BS x 4  Percutaneous cholecystostomy tube with minimal leakage  Yellow/green drainage      Scheduled Meds:  Current Facility-Administered Medications:  amLODIPine 10 mg Oral Daily Sydney Eric MD    b complex-vitamin C-folic acid 1 capsule Oral Daily With Arina Up MD    cholecalciferol 1,000 Units Oral Daily Sydney Eric MD    colchicine 0 6 mg Oral Daily Sydney Eric MD    heparin (porcine) 5,000 Units Subcutaneous [facility] Sydney Eric MD    insulin lispro 1-5 Units Subcutaneous TID [facility] Jhon Joanne Williamson MD    insulin lispro 1-5 Units Subcutaneous HS Karen Mitchell MD    morphine injection 2 mg Intravenous Q2H PRN Fina Hopkins MD    ondansetron 4 mg Intravenous Q6H PRN Fina Hopkins MD    oxyCODONE-acetaminophen 1 tablet Oral Q4H PRN Fina Hopkins MD    pantoprazole 40 mg Oral Early Morning Fina Hopkins MD    piperacillin-tazobactam 3 375 g Intravenous Q6H Fina Hopkins MD Last Rate: 3 375 g (02/06/20 7305)   saccharomyces boulardii 250 mg Oral BID Fina Hopkins MD    simethicone 80 mg Oral Q6H PRN Fina Hopkins MD    sodium bicarbonate 650 mg Oral BID after meals Fina Hopkins MD    sodium chloride (PF) 10 mL Intravenous Once per day on Mon Thu Fina Hopkins MD    sodium chloride 100 mL/hr Intravenous Continuous Fina Hopkins MD Last Rate: 100 mL/hr (02/06/20 0126)   tamsulosin 0 4 mg Oral Daily With Satnam Kaba MD      Continuous Infusions:  sodium chloride 100 mL/hr Last Rate: 100 mL/hr (02/06/20 0126)     PRN Meds: morphine injection    ondansetron    oxyCODONE-acetaminophen    simethicone      Lab, Imaging and other studies:I have personally reviewed pertinent lab results      VTE Pharmacologic Prophylaxis: Heparin  VTE Mechanical Prophylaxis: sequential compression device      Yogesh Burns PA-C  2/6/2020 7:46 AM

## 2020-02-06 NOTE — PLAN OF CARE
Problem: Potential for Falls  Goal: Patient will remain free of falls  Description  INTERVENTIONS:  - Assess patient frequently for physical needs  -  Identify cognitive and physical deficits and behaviors that affect risk of falls    -  [place] fall precautions as indicated by assessment   - Educate patient/family on patient safety including physical limitations  - Instruct patient to call for assistance with activity based on assessment  - Modify environment to reduce risk of injury  - Consider OT/PT consult to assist with strengthening/mobility  Outcome: Progressing     Problem: PAIN - ADULT  Goal: Verbalizes/displays adequate comfort level or baseline comfort level  Description  Interventions:  - Encourage patient to monitor pain and request assistance  - Assess pain using appropriate pain scale  - Administer analgesics based on type and severity of pain and evaluate response  - Implement non-pharmacological measures as appropriate and evaluate response  - Consider cultural and social influences on pain and pain management  - Notify physician/advanced practitioner if interventions unsuccessful or patient reports new pain  Outcome: Progressing     Problem: INFECTION - ADULT  Goal: Absence or prevention of progression during hospitalization  Description  INTERVENTIONS:  - Assess and monitor for signs and symptoms of infection  - Monitor lab/diagnostic results  - Monitor all insertion sites, i e  indwelling lines, tubes, and drains  - Monitor endotracheal if appropriate and nasal secretions for changes in amount and color  - [place] appropriate cooling/warming therapies per order  - Administer medications as ordered  - Instruct and encourage patient and family to use good hand hygiene technique  - Identify and instruct in appropriate isolation precautions for identified infection/condition  Outcome: Progressing  Goal: Absence of fever/infection during neutropenic period  Description  INTERVENTIONS:  - Monitor WBC    Outcome: Progressing     Problem: SAFETY ADULT  Goal: Patient will remain free of falls  Description  INTERVENTIONS:  - Assess patient frequently for physical needs  -  Identify cognitive and physical deficits and behaviors that affect risk of falls    -  [place] fall precautions as indicated by assessment   - Educate patient/family on patient safety including physical limitations  - Instruct patient to call for assistance with activity based on assessment  - Modify environment to reduce risk of injury  - Consider OT/PT consult to assist with strengthening/mobility  Outcome: Progressing  Goal: Maintain or return to baseline ADL function  Description  INTERVENTIONS:  -  Assess patient's ability to carry out ADLs; assess patient's baseline for ADL function and identify physical deficits which impact ability to perform ADLs (bathing, care of mouth/teeth, toileting, grooming, dressing, etc )  - Assess/evaluate cause of self-care deficits   - Assess range of motion  - Assess patient's mobility; develop plan if impaired  - Assess patient's need for assistive devices and provide as appropriate  - Encourage maximum independence but intervene and supervise when necessary  - Involve family in performance of ADLs  - Assess for home care needs following discharge   - Consider OT consult to assist with ADL evaluation and planning for discharge  - Provide patient education as appropriate  Outcome: Progressing  Goal: Maintain or return mobility status to optimal level  Description  INTERVENTIONS:  - Assess patient's baseline mobility status (ambulation, transfers, stairs, etc )    - Identify cognitive and physical deficits and behaviors that affect mobility  - Identify mobility aids required to assist with transfers and/or ambulation (gait belt, sit-to-stand, lift, walker, cane, etc )  - [place] fall precautions as indicated by assessment  - Record patient progress and toleration of activity level on Mobility SBAR; progress patient to next Phase/Stage  - Instruct patient to call for assistance with activity based on assessment  - Consider rehabilitation consult to assist with strengthening/weightbearing, etc   Outcome: Progressing     Problem: DISCHARGE PLANNING  Goal: Discharge to home or other facility with appropriate resources  Description  INTERVENTIONS:  - Identify barriers to discharge w/patient and caregiver  - Arrange for needed discharge resources and transportation as appropriate  - Identify discharge learning needs (meds, wound care, etc )  - Arrange for interpretive services to assist at discharge as needed  - Refer to Case Management Department for coordinating discharge planning if the patient needs post-hospital services based on physician/advanced practitioner order or complex needs related to functional status, cognitive ability, or social support system  Outcome: Progressing     Problem: Knowledge Deficit  Goal: Patient/family/caregiver demonstrates understanding of disease process, treatment plan, medications, and discharge instructions  Description  Complete learning assessment and assess knowledge base    Interventions:  - Provide teaching at level of understanding  - Provide teaching via preferred learning methods  Outcome: Progressing     Problem: Prexisting or High Potential for Compromised Skin Integrity  Goal: Skin integrity is maintained or improved  Description  INTERVENTIONS:  - Identify patients at risk for skin breakdown  - Assess and monitor skin integrity  - Assess and monitor nutrition and hydration status  - Monitor labs   - Assess for incontinence   - Turn and reposition patient  - Assist with mobility/ambulation  - Relieve pressure over bony prominences  - Avoid friction and shearing  - Provide appropriate hygiene as needed including keeping skin clean and dry  - Evaluate need for skin moisturizer/barrier cream  - Collaborate with interdisciplinary team   - Patient/family teaching  - Consider wound care consult   Outcome: Progressing

## 2020-02-06 NOTE — PLAN OF CARE
Problem: Potential for Falls  Goal: Patient will remain free of falls  Description  INTERVENTIONS:  - Assess patient frequently for physical needs  -  Identify cognitive and physical deficits and behaviors that affect risk of falls    -  [place] fall precautions as indicated by assessment   - Educate patient/family on patient safety including physical limitations  - Instruct patient to call for assistance with activity based on assessment  - Modify environment to reduce risk of injury  - Consider OT/PT consult to assist with strengthening/mobility  Outcome: Progressing     Problem: PAIN - ADULT  Goal: Verbalizes/displays adequate comfort level or baseline comfort level  Description  Interventions:  - Encourage patient to monitor pain and request assistance  - Assess pain using appropriate pain scale  - Administer analgesics based on type and severity of pain and evaluate response  - Implement non-pharmacological measures as appropriate and evaluate response  - Consider cultural and social influences on pain and pain management  - Notify physician/advanced practitioner if interventions unsuccessful or patient reports new pain  Outcome: Progressing     Problem: INFECTION - ADULT  Goal: Absence or prevention of progression during hospitalization  Description  INTERVENTIONS:  - Assess and monitor for signs and symptoms of infection  - Monitor lab/diagnostic results  - Monitor all insertion sites, i e  indwelling lines, tubes, and drains  - Monitor endotracheal if appropriate and nasal secretions for changes in amount and color  - [place] appropriate cooling/warming therapies per order  - Administer medications as ordered  - Instruct and encourage patient and family to use good hand hygiene technique  - Identify and instruct in appropriate isolation precautions for identified infection/condition  Outcome: Progressing  Goal: Absence of fever/infection during neutropenic period  Description  INTERVENTIONS:  - Monitor WBC    Outcome: Progressing     Problem: SAFETY ADULT  Goal: Patient will remain free of falls  Description  INTERVENTIONS:  - Assess patient frequently for physical needs  -  Identify cognitive and physical deficits and behaviors that affect risk of falls    -  [place] fall precautions as indicated by assessment   - Educate patient/family on patient safety including physical limitations  - Instruct patient to call for assistance with activity based on assessment  - Modify environment to reduce risk of injury  - Consider OT/PT consult to assist with strengthening/mobility  Outcome: Progressing  Goal: Maintain or return to baseline ADL function  Description  INTERVENTIONS:  -  Assess patient's ability to carry out ADLs; assess patient's baseline for ADL function and identify physical deficits which impact ability to perform ADLs (bathing, care of mouth/teeth, toileting, grooming, dressing, etc )  - Assess/evaluate cause of self-care deficits   - Assess range of motion  - Assess patient's mobility; develop plan if impaired  - Assess patient's need for assistive devices and provide as appropriate  - Encourage maximum independence but intervene and supervise when necessary  - Involve family in performance of ADLs  - Assess for home care needs following discharge   - Consider OT consult to assist with ADL evaluation and planning for discharge  - Provide patient education as appropriate  Outcome: Progressing  Goal: Maintain or return mobility status to optimal level  Description  INTERVENTIONS:  - Assess patient's baseline mobility status (ambulation, transfers, stairs, etc )    - Identify cognitive and physical deficits and behaviors that affect mobility  - Identify mobility aids required to assist with transfers and/or ambulation (gait belt, sit-to-stand, lift, walker, cane, etc )  - [place] fall precautions as indicated by assessment  - Record patient progress and toleration of activity level on Mobility SBAR; progress patient to next Phase/Stage  - Instruct patient to call for assistance with activity based on assessment  - Consider rehabilitation consult to assist with strengthening/weightbearing, etc   Outcome: Progressing     Problem: DISCHARGE PLANNING  Goal: Discharge to home or other facility with appropriate resources  Description  INTERVENTIONS:  - Identify barriers to discharge w/patient and caregiver  - Arrange for needed discharge resources and transportation as appropriate  - Identify discharge learning needs (meds, wound care, etc )  - Arrange for interpretive services to assist at discharge as needed  - Refer to Case Management Department for coordinating discharge planning if the patient needs post-hospital services based on physician/advanced practitioner order or complex needs related to functional status, cognitive ability, or social support system  Outcome: Progressing     Problem: Knowledge Deficit  Goal: Patient/family/caregiver demonstrates understanding of disease process, treatment plan, medications, and discharge instructions  Description  Complete learning assessment and assess knowledge base    Interventions:  - Provide teaching at level of understanding  - Provide teaching via preferred learning methods  Outcome: Progressing     Problem: Prexisting or High Potential for Compromised Skin Integrity  Goal: Skin integrity is maintained or improved  Description  INTERVENTIONS:  - Identify patients at risk for skin breakdown  - Assess and monitor skin integrity  - Assess and monitor nutrition and hydration status  - Monitor labs   - Assess for incontinence   - Turn and reposition patient  - Assist with mobility/ambulation  - Relieve pressure over bony prominences  - Avoid friction and shearing  - Provide appropriate hygiene as needed including keeping skin clean and dry  - Evaluate need for skin moisturizer/barrier cream  - Collaborate with interdisciplinary team   - Patient/family teaching  - Consider wound care consult   Outcome: Progressing

## 2020-02-07 ENCOUNTER — ANESTHESIA EVENT (INPATIENT)
Dept: PERIOP | Facility: HOSPITAL | Age: 80
DRG: 415 | End: 2020-02-07
Payer: COMMERCIAL

## 2020-02-07 ENCOUNTER — APPOINTMENT (INPATIENT)
Dept: RADIOLOGY | Facility: HOSPITAL | Age: 80
DRG: 415 | End: 2020-02-07
Payer: COMMERCIAL

## 2020-02-07 ENCOUNTER — ANESTHESIA (INPATIENT)
Dept: PERIOP | Facility: HOSPITAL | Age: 80
DRG: 415 | End: 2020-02-07
Payer: COMMERCIAL

## 2020-02-07 LAB
ALBUMIN SERPL BCP-MCNC: 2.7 G/DL (ref 3.5–5.7)
ALP SERPL-CCNC: 89 U/L (ref 55–165)
ALT SERPL W P-5'-P-CCNC: 13 U/L (ref 7–52)
ANION GAP SERPL CALCULATED.3IONS-SCNC: 11 MMOL/L (ref 4–13)
AST SERPL W P-5'-P-CCNC: 24 U/L (ref 13–39)
BACTERIA SPEC BFLD CULT: ABNORMAL
BASOPHILS # BLD AUTO: 0.1 THOUSANDS/ΜL (ref 0–0.1)
BASOPHILS NFR BLD AUTO: 1 % (ref 0–2)
BILIRUB SERPL-MCNC: 0.5 MG/DL (ref 0.2–1)
BUN SERPL-MCNC: 36 MG/DL (ref 7–25)
CALCIUM SERPL-MCNC: 7.9 MG/DL (ref 8.6–10.5)
CHLORIDE SERPL-SCNC: 109 MMOL/L (ref 98–107)
CO2 SERPL-SCNC: 16 MMOL/L (ref 21–31)
CREAT SERPL-MCNC: 2.6 MG/DL (ref 0.7–1.3)
EOSINOPHIL # BLD AUTO: 0.1 THOUSAND/ΜL (ref 0–0.61)
EOSINOPHIL NFR BLD AUTO: 1 % (ref 0–5)
ERYTHROCYTE [DISTWIDTH] IN BLOOD BY AUTOMATED COUNT: 15.3 % (ref 11.5–14.5)
GFR SERPL CREATININE-BSD FRML MDRD: 22 ML/MIN/1.73SQ M
GLUCOSE SERPL-MCNC: 194 MG/DL (ref 65–140)
GLUCOSE SERPL-MCNC: 211 MG/DL (ref 65–140)
GLUCOSE SERPL-MCNC: 217 MG/DL (ref 65–99)
GLUCOSE SERPL-MCNC: 218 MG/DL (ref 65–140)
GLUCOSE SERPL-MCNC: 238 MG/DL (ref 65–140)
GRAM STN SPEC: ABNORMAL
GRAM STN SPEC: ABNORMAL
HCT VFR BLD AUTO: 29 % (ref 42–47)
HGB BLD-MCNC: 9.3 G/DL (ref 14–18)
LYMPHOCYTES # BLD AUTO: 1.1 THOUSANDS/ΜL (ref 0.6–4.47)
LYMPHOCYTES NFR BLD AUTO: 9 % (ref 21–51)
MCH RBC QN AUTO: 28.9 PG (ref 26–34)
MCHC RBC AUTO-ENTMCNC: 32.1 G/DL (ref 31–37)
MCV RBC AUTO: 90 FL (ref 81–99)
MONOCYTES # BLD AUTO: 1.2 THOUSAND/ΜL (ref 0.17–1.22)
MONOCYTES NFR BLD AUTO: 10 % (ref 2–12)
NEUTROPHILS # BLD AUTO: 10 THOUSANDS/ΜL (ref 1.4–6.5)
NEUTS SEG NFR BLD AUTO: 80 % (ref 42–75)
PLATELET # BLD AUTO: 202 THOUSANDS/UL (ref 149–390)
PMV BLD AUTO: 8.6 FL (ref 8.6–11.7)
POTASSIUM SERPL-SCNC: 4.3 MMOL/L (ref 3.5–5.5)
PROT SERPL-MCNC: 5.7 G/DL (ref 6.4–8.9)
RBC # BLD AUTO: 3.22 MILLION/UL (ref 4.3–5.9)
SODIUM SERPL-SCNC: 136 MMOL/L (ref 134–143)
WBC # BLD AUTO: 12.5 THOUSAND/UL (ref 4.8–10.8)

## 2020-02-07 PROCEDURE — NC001 PR NO CHARGE: Performed by: SPECIALIST

## 2020-02-07 PROCEDURE — 88341 IMHCHEM/IMCYTCHM EA ADD ANTB: CPT | Performed by: PATHOLOGY

## 2020-02-07 PROCEDURE — 80053 COMPREHEN METABOLIC PANEL: CPT | Performed by: PHYSICIAN ASSISTANT

## 2020-02-07 PROCEDURE — 82948 REAGENT STRIP/BLOOD GLUCOSE: CPT

## 2020-02-07 PROCEDURE — 88342 IMHCHEM/IMCYTCHM 1ST ANTB: CPT | Performed by: PATHOLOGY

## 2020-02-07 PROCEDURE — BF101ZZ FLUOROSCOPY OF BILE DUCTS USING LOW OSMOLAR CONTRAST: ICD-10-PCS | Performed by: SPECIALIST

## 2020-02-07 PROCEDURE — 88304 TISSUE EXAM BY PATHOLOGIST: CPT | Performed by: PATHOLOGY

## 2020-02-07 PROCEDURE — 0FJ44ZZ INSPECTION OF GALLBLADDER, PERCUTANEOUS ENDOSCOPIC APPROACH: ICD-10-PCS | Performed by: SPECIALIST

## 2020-02-07 PROCEDURE — 0FT40ZZ RESECTION OF GALLBLADDER, OPEN APPROACH: ICD-10-PCS | Performed by: SPECIALIST

## 2020-02-07 PROCEDURE — 85025 COMPLETE CBC W/AUTO DIFF WBC: CPT | Performed by: PHYSICIAN ASSISTANT

## 2020-02-07 PROCEDURE — 74300 X-RAY BILE DUCTS/PANCREAS: CPT

## 2020-02-07 RX ORDER — ROCURONIUM BROMIDE 10 MG/ML
INJECTION, SOLUTION INTRAVENOUS AS NEEDED
Status: DISCONTINUED | OUTPATIENT
Start: 2020-02-07 | End: 2020-02-07 | Stop reason: SURG

## 2020-02-07 RX ORDER — DOCUSATE SODIUM 100 MG/1
100 CAPSULE, LIQUID FILLED ORAL 2 TIMES DAILY PRN
Status: DISCONTINUED | OUTPATIENT
Start: 2020-02-07 | End: 2020-02-10

## 2020-02-07 RX ORDER — NEOSTIGMINE METHYLSULFATE 1 MG/ML
INJECTION INTRAVENOUS AS NEEDED
Status: DISCONTINUED | OUTPATIENT
Start: 2020-02-07 | End: 2020-02-07 | Stop reason: SURG

## 2020-02-07 RX ORDER — OXYCODONE HYDROCHLORIDE AND ACETAMINOPHEN 5; 325 MG/1; MG/1
2 TABLET ORAL EVERY 4 HOURS PRN
Status: DISCONTINUED | OUTPATIENT
Start: 2020-02-07 | End: 2020-02-20 | Stop reason: HOSPADM

## 2020-02-07 RX ORDER — MAGNESIUM HYDROXIDE 1200 MG/15ML
LIQUID ORAL AS NEEDED
Status: DISCONTINUED | OUTPATIENT
Start: 2020-02-07 | End: 2020-02-07 | Stop reason: HOSPADM

## 2020-02-07 RX ORDER — CEFAZOLIN SODIUM 1 G/3ML
INJECTION, POWDER, FOR SOLUTION INTRAMUSCULAR; INTRAVENOUS AS NEEDED
Status: DISCONTINUED | OUTPATIENT
Start: 2020-02-07 | End: 2020-02-07 | Stop reason: SURG

## 2020-02-07 RX ORDER — HYDROMORPHONE HCL/PF 1 MG/ML
SYRINGE (ML) INJECTION AS NEEDED
Status: DISCONTINUED | OUTPATIENT
Start: 2020-02-07 | End: 2020-02-07 | Stop reason: SURG

## 2020-02-07 RX ORDER — PROPOFOL 10 MG/ML
INJECTION, EMULSION INTRAVENOUS AS NEEDED
Status: DISCONTINUED | OUTPATIENT
Start: 2020-02-07 | End: 2020-02-07 | Stop reason: SURG

## 2020-02-07 RX ORDER — GLYCOPYRROLATE 0.2 MG/ML
INJECTION INTRAMUSCULAR; INTRAVENOUS AS NEEDED
Status: DISCONTINUED | OUTPATIENT
Start: 2020-02-07 | End: 2020-02-07 | Stop reason: SURG

## 2020-02-07 RX ORDER — LIDOCAINE HYDROCHLORIDE 10 MG/ML
INJECTION, SOLUTION EPIDURAL; INFILTRATION; INTRACAUDAL; PERINEURAL AS NEEDED
Status: DISCONTINUED | OUTPATIENT
Start: 2020-02-07 | End: 2020-02-07 | Stop reason: SURG

## 2020-02-07 RX ORDER — ACETAMINOPHEN 325 MG/1
650 TABLET ORAL EVERY 6 HOURS PRN
Status: DISCONTINUED | OUTPATIENT
Start: 2020-02-07 | End: 2020-02-20 | Stop reason: HOSPADM

## 2020-02-07 RX ORDER — BUPIVACAINE HYDROCHLORIDE AND EPINEPHRINE 2.5; 5 MG/ML; UG/ML
INJECTION, SOLUTION EPIDURAL; INFILTRATION; INTRACAUDAL; PERINEURAL AS NEEDED
Status: DISCONTINUED | OUTPATIENT
Start: 2020-02-07 | End: 2020-02-07 | Stop reason: HOSPADM

## 2020-02-07 RX ORDER — HYDROMORPHONE HCL/PF 1 MG/ML
0.4 SYRINGE (ML) INJECTION
Status: DISCONTINUED | OUTPATIENT
Start: 2020-02-07 | End: 2020-02-07 | Stop reason: HOSPADM

## 2020-02-07 RX ORDER — ONDANSETRON 2 MG/ML
INJECTION INTRAMUSCULAR; INTRAVENOUS AS NEEDED
Status: DISCONTINUED | OUTPATIENT
Start: 2020-02-07 | End: 2020-02-07 | Stop reason: SURG

## 2020-02-07 RX ORDER — HYDROMORPHONE HCL 110MG/55ML
0.5 PATIENT CONTROLLED ANALGESIA SYRINGE INTRAVENOUS EVERY 4 HOURS PRN
Status: DISCONTINUED | OUTPATIENT
Start: 2020-02-07 | End: 2020-02-20 | Stop reason: HOSPADM

## 2020-02-07 RX ORDER — FENTANYL CITRATE 50 UG/ML
INJECTION, SOLUTION INTRAMUSCULAR; INTRAVENOUS AS NEEDED
Status: DISCONTINUED | OUTPATIENT
Start: 2020-02-07 | End: 2020-02-07 | Stop reason: SURG

## 2020-02-07 RX ADMIN — PIPERACILLIN SODIUM AND TAZOBACTAM SODIUM 3.38 G: 3; .375 INJECTION, POWDER, LYOPHILIZED, FOR SOLUTION INTRAVENOUS at 00:14

## 2020-02-07 RX ADMIN — HYDROMORPHONE HYDROCHLORIDE 0.25 MG: 1 INJECTION, SOLUTION INTRAMUSCULAR; INTRAVENOUS; SUBCUTANEOUS at 16:51

## 2020-02-07 RX ADMIN — SODIUM CHLORIDE 100 ML/HR: 9 INJECTION, SOLUTION INTRAVENOUS at 11:44

## 2020-02-07 RX ADMIN — HYDROMORPHONE HYDROCHLORIDE 0.5 MG: 1 INJECTION, SOLUTION INTRAMUSCULAR; INTRAVENOUS; SUBCUTANEOUS at 17:36

## 2020-02-07 RX ADMIN — HYDROMORPHONE HYDROCHLORIDE 0.5 MG: 1 INJECTION, SOLUTION INTRAMUSCULAR; INTRAVENOUS; SUBCUTANEOUS at 18:46

## 2020-02-07 RX ADMIN — AMLODIPINE BESYLATE 10 MG: 5 TABLET ORAL at 10:19

## 2020-02-07 RX ADMIN — INSULIN LISPRO 1 UNITS: 100 INJECTION, SOLUTION INTRAVENOUS; SUBCUTANEOUS at 11:44

## 2020-02-07 RX ADMIN — FENTANYL CITRATE 25 MCG: 50 INJECTION INTRAMUSCULAR; INTRAVENOUS at 15:52

## 2020-02-07 RX ADMIN — INSULIN LISPRO 2 UNITS: 100 INJECTION, SOLUTION INTRAVENOUS; SUBCUTANEOUS at 23:10

## 2020-02-07 RX ADMIN — Medication 250 MG: at 10:20

## 2020-02-07 RX ADMIN — SODIUM CHLORIDE: 9 INJECTION, SOLUTION INTRAVENOUS at 18:32

## 2020-02-07 RX ADMIN — FENTANYL CITRATE 25 MCG: 50 INJECTION INTRAMUSCULAR; INTRAVENOUS at 16:27

## 2020-02-07 RX ADMIN — PHENYLEPHRINE HYDROCHLORIDE 200 MCG: 10 INJECTION INTRAVENOUS at 18:06

## 2020-02-07 RX ADMIN — HYDROMORPHONE HYDROCHLORIDE 0.25 MG: 1 INJECTION, SOLUTION INTRAMUSCULAR; INTRAVENOUS; SUBCUTANEOUS at 17:00

## 2020-02-07 RX ADMIN — ONDANSETRON 4 MG: 2 INJECTION INTRAMUSCULAR; INTRAVENOUS at 18:31

## 2020-02-07 RX ADMIN — INSULIN LISPRO 2 UNITS: 100 INJECTION, SOLUTION INTRAVENOUS; SUBCUTANEOUS at 08:06

## 2020-02-07 RX ADMIN — HYDROMORPHONE HYDROCHLORIDE 0.5 MG: 1 INJECTION, SOLUTION INTRAMUSCULAR; INTRAVENOUS; SUBCUTANEOUS at 18:54

## 2020-02-07 RX ADMIN — SODIUM CHLORIDE: 9 INJECTION, SOLUTION INTRAVENOUS at 17:05

## 2020-02-07 RX ADMIN — SODIUM CHLORIDE 100 ML/HR: 9 INJECTION, SOLUTION INTRAVENOUS at 00:17

## 2020-02-07 RX ADMIN — ROCURONIUM BROMIDE 35 MG: 10 INJECTION INTRAVENOUS at 15:52

## 2020-02-07 RX ADMIN — HEPARIN SODIUM 5000 UNITS: 5000 INJECTION INTRAVENOUS; SUBCUTANEOUS at 23:05

## 2020-02-07 RX ADMIN — PROPOFOL 100 MG: 10 INJECTION, EMULSION INTRAVENOUS at 15:52

## 2020-02-07 RX ADMIN — ROCURONIUM BROMIDE 10 MG: 10 INJECTION INTRAVENOUS at 17:26

## 2020-02-07 RX ADMIN — ROCURONIUM BROMIDE 15 MG: 10 INJECTION INTRAVENOUS at 16:11

## 2020-02-07 RX ADMIN — PHENYLEPHRINE HYDROCHLORIDE 100 MCG: 10 INJECTION INTRAVENOUS at 18:00

## 2020-02-07 RX ADMIN — PIPERACILLIN SODIUM AND TAZOBACTAM SODIUM 3.38 G: 3; .375 INJECTION, POWDER, LYOPHILIZED, FOR SOLUTION INTRAVENOUS at 06:38

## 2020-02-07 RX ADMIN — FENTANYL CITRATE 50 MCG: 50 INJECTION INTRAMUSCULAR; INTRAVENOUS at 16:12

## 2020-02-07 RX ADMIN — NEOSTIGMINE METHYLSULFATE 3 MG: 1 INJECTION, SOLUTION INTRAVENOUS at 18:41

## 2020-02-07 RX ADMIN — CEFAZOLIN 1000 MG: 1 INJECTION, POWDER, FOR SOLUTION INTRAMUSCULAR; INTRAVENOUS at 15:59

## 2020-02-07 RX ADMIN — PIPERACILLIN SODIUM AND TAZOBACTAM SODIUM 3.38 G: 3; .375 INJECTION, POWDER, LYOPHILIZED, FOR SOLUTION INTRAVENOUS at 11:44

## 2020-02-07 RX ADMIN — GLYCOPYRROLATE 0.6 MG: 0.2 INJECTION, SOLUTION INTRAMUSCULAR; INTRAVENOUS at 18:41

## 2020-02-07 RX ADMIN — LIDOCAINE HYDROCHLORIDE 100 MG: 10 INJECTION, SOLUTION EPIDURAL; INFILTRATION; INTRACAUDAL; PERINEURAL at 15:52

## 2020-02-07 NOTE — PLAN OF CARE
Problem: Potential for Falls  Goal: Patient will remain free of falls  Description  INTERVENTIONS:  - Assess patient frequently for physical needs  -  Identify cognitive and physical deficits and behaviors that affect risk of falls    -  [place] fall precautions as indicated by assessment   - Educate patient/family on patient safety including physical limitations  - Instruct patient to call for assistance with activity based on assessment  - Modify environment to reduce risk of injury  - Consider OT/PT consult to assist with strengthening/mobility  Outcome: Progressing     Problem: PAIN - ADULT  Goal: Verbalizes/displays adequate comfort level or baseline comfort level  Description  Interventions:  - Encourage patient to monitor pain and request assistance  - Assess pain using appropriate pain scale  - Administer analgesics based on type and severity of pain and evaluate response  - Implement non-pharmacological measures as appropriate and evaluate response  - Consider cultural and social influences on pain and pain management  - Notify physician/advanced practitioner if interventions unsuccessful or patient reports new pain  Outcome: Progressing     Problem: INFECTION - ADULT  Goal: Absence or prevention of progression during hospitalization  Description  INTERVENTIONS:  - Assess and monitor for signs and symptoms of infection  - Monitor lab/diagnostic results  - Monitor all insertion sites, i e  indwelling lines, tubes, and drains  - Monitor endotracheal if appropriate and nasal secretions for changes in amount and color  - [place] appropriate cooling/warming therapies per order  - Administer medications as ordered  - Instruct and encourage patient and family to use good hand hygiene technique  - Identify and instruct in appropriate isolation precautions for identified infection/condition  Outcome: Progressing  Goal: Absence of fever/infection during neutropenic period  Description  INTERVENTIONS:  - Monitor WBC    Outcome: Progressing     Problem: SAFETY ADULT  Goal: Patient will remain free of falls  Description  INTERVENTIONS:  - Assess patient frequently for physical needs  -  Identify cognitive and physical deficits and behaviors that affect risk of falls    -  [place] fall precautions as indicated by assessment   - Educate patient/family on patient safety including physical limitations  - Instruct patient to call for assistance with activity based on assessment  - Modify environment to reduce risk of injury  - Consider OT/PT consult to assist with strengthening/mobility  Outcome: Progressing  Goal: Maintain or return to baseline ADL function  Description  INTERVENTIONS:  -  Assess patient's ability to carry out ADLs; assess patient's baseline for ADL function and identify physical deficits which impact ability to perform ADLs (bathing, care of mouth/teeth, toileting, grooming, dressing, etc )  - Assess/evaluate cause of self-care deficits   - Assess range of motion  - Assess patient's mobility; develop plan if impaired  - Assess patient's need for assistive devices and provide as appropriate  - Encourage maximum independence but intervene and supervise when necessary  - Involve family in performance of ADLs  - Assess for home care needs following discharge   - Consider OT consult to assist with ADL evaluation and planning for discharge  - Provide patient education as appropriate  Outcome: Progressing  Goal: Maintain or return mobility status to optimal level  Description  INTERVENTIONS:  - Assess patient's baseline mobility status (ambulation, transfers, stairs, etc )    - Identify cognitive and physical deficits and behaviors that affect mobility  - Identify mobility aids required to assist with transfers and/or ambulation (gait belt, sit-to-stand, lift, walker, cane, etc )  - [place] fall precautions as indicated by assessment  - Record patient progress and toleration of activity level on Mobility SBAR; progress patient to next Phase/Stage  - Instruct patient to call for assistance with activity based on assessment  - Consider rehabilitation consult to assist with strengthening/weightbearing, etc   Outcome: Progressing     Problem: DISCHARGE PLANNING  Goal: Discharge to home or other facility with appropriate resources  Description  INTERVENTIONS:  - Identify barriers to discharge w/patient and caregiver  - Arrange for needed discharge resources and transportation as appropriate  - Identify discharge learning needs (meds, wound care, etc )  - Arrange for interpretive services to assist at discharge as needed  - Refer to Case Management Department for coordinating discharge planning if the patient needs post-hospital services based on physician/advanced practitioner order or complex needs related to functional status, cognitive ability, or social support system  Outcome: Progressing     Problem: Knowledge Deficit  Goal: Patient/family/caregiver demonstrates understanding of disease process, treatment plan, medications, and discharge instructions  Description  Complete learning assessment and assess knowledge base    Interventions:  - Provide teaching at level of understanding  - Provide teaching via preferred learning methods  Outcome: Progressing     Problem: Prexisting or High Potential for Compromised Skin Integrity  Goal: Skin integrity is maintained or improved  Description  INTERVENTIONS:  - Identify patients at risk for skin breakdown  - Assess and monitor skin integrity  - Assess and monitor nutrition and hydration status  - Monitor labs   - Assess for incontinence   - Turn and reposition patient  - Assist with mobility/ambulation  - Relieve pressure over bony prominences  - Avoid friction and shearing  - Provide appropriate hygiene as needed including keeping skin clean and dry  - Evaluate need for skin moisturizer/barrier cream  - Collaborate with interdisciplinary team   - Patient/family teaching  - Consider wound care consult   Outcome: Progressing     Problem: DISCHARGE PLANNING - CARE MANAGEMENT  Goal: Discharge to post-acute care or home with appropriate resources  Description  INTERVENTIONS:  - Conduct assessment to determine patient/family and health care team treatment goals, and need for post-acute services based on payer coverage, community resources, and patient preferences, and barriers to discharge  - Address psychosocial, clinical, and financial barriers to discharge as identified in assessment in conjunction with the patient/family and health care team  - Arrange appropriate level of post-acute services according to patient's   needs and preference and payer coverage in collaboration with the physician and health care team  - Communicate with and update the patient/family, physician, and health care team regarding progress on the discharge plan  - Arrange appropriate transportation to post-acute venues    Need to reassess    Outcome: Progressing

## 2020-02-07 NOTE — OP NOTE
OPERATIVE REPORT  PATIENT NAME: Edgardo Allison    :  1940  MRN: [id number]  Pt Location: [address] [place] [address] OR ROOM 02    SURGERY DATE: 2020    Surgeon(s) and Role:     * Kael Rucker MD - Primary     * Mulugeta Robertson PA-C - Assisting  The assistance of a trained surgical PA was necessary to the successful outcome of this case  The [de-identified] assistant provided expert exposure both during a laparoscopic, and open phase of this operation  By providing expert retraction and assistance with instrumentation the physicians assistant provided a safe operating environment  She also help close the skin incisions, position the patient on the operating table, and assisted in moved the patient to his stretcher at the end of the case  Preop Diagnosis:  Acute cholecystitis [K81 0]    Post-Op Diagnosis Codes:     * Gangrenous cholecystitis [K81 0]    Procedure(s) (LRB):  CHOLECYSTECTOMY LAPAROSCOPIC (N/A)    Specimen(s):  ID Type Source Tests Collected by Time Destination   1 :  Tissue Gallbladder TISSUE EXAM Kael Rucker MD 2020 1626    Intraoperative cholangiogram    Estimated Blood Loss:   150 mL    Drains:  Closed/Suction Drain Right Abdomen Bulb 15 Fr  (Active)   Dressing Status Clean;Dry; Intact 2020  6:20 PM   Drainage Appearance Serosanguineous 2020  6:20 PM   Status To bulb suction 2020  6:20 PM   Number of days: 0       NG/OG/Enteral Tube Orogastric 18 Fr Center mouth (Active)   Number of days: 0       Urethral Catheter 18 Fr   (Active)   Reasons to continue Urinary Catheter  Chronic urinary catheter 2020  8:00 AM   Goal for Removal N/A- chronic garcia 2020  8:33 PM   Site Assessment Clean;Skin intact 2020  8:00 AM   Collection Container Standard drainage bag 2020  8:00 AM   Securement Method Securing device (Describe) 2020  8:00 AM   Output (mL) 300 mL 2020  3:00 PM   Number of days: 45       Anesthesia Type:   General    Operative Indications:  Acute cholecystitis [K81 0]      Operative Findings:  Gangrenous cholecystitis    Complications:   Conversion to an open procedure    Procedure and Technique:  The patient was identified by myself taken to the operating room and placed in the supine position on the operating table  After induction of satisfactory general endotracheal anesthesia the patient's percutaneous cholecystostomy tube was removed and the abdomen prepped and draped in the usual sterile fashion using ChloraPrep solution  A time-out was called the patient identified as Champ Tatum, IV antibiotics were confirmed as given, sequential compression devices were on and functioning, all parties agreed this was the appropriate patient for the proposed procedure  Local anesthetic consisting of 0 25% Marcaine with epinephrine was infiltrated following which a curvilinear infraumbilical incision was performed using the # 15 Scalpel blade and deepened through the skin and subcutaneous tissue  The abdominal wall was elevated with perforating towel clip and a Veress needle was introduced  After performing the saline drop test the abdomen was insufflated with carbon dioxide  After achieving satisfactory pneumoperitoneum the Veress needle was removed and an 11 mm optical cannula with a 10 mm 0 degree laparoscopic within was introduced  After breaching the peritoneum the trocar portion was removed and the laparoscopic reintroduced  There is no evidence of trauma to the viscera from the port placement  On examining the right upper quadrant the liver edge was completely obscured by adherent omentum  Metastatic tumor was clearly appreciated in the left lobe of the liver, the right lobe of the liver being obscured  Under laparoscopic direction an epigastric 5 mm port and 2 subcostal 5 mm ports were placed    The hook attachment to the monopolar electrocautery as well as monopolar electrocautery with the Endo Chaz and blunt dissection with the suction  were utilized to free up the omentum from the liver edge and dissecting this revealed the fundus of a completely necrotic gallbladder  There was no opportunity to retract the gallbladder as it was disintegrating, and at this point the case was converted to an open procedure  The ports removed and the pneumoperitoneum was allowed to dissipate  The fascia at the umbilical port site was repaired using interrupted figure-of-eight 0 Vicryl suture, and the skin closed using subcuticular 4 Vicryl suture  A right upper quadrant Kocher incision was performed using a 10  Scalpel and deepened through the skin and subcutaneous tissue using the monopolar electrocautery  This utilized the epigastric in the medial subcostal 5 mm port sites  The anterior rectus sheath was scored with the cautery, the rectus muscle was divided separately with the cautery with attention to hemostasis of the superior epigastric artery  The posterior rectus sheath was then tented between hemostats and entered sharply  The remainder of the incision was completed using the electrocautery  On entering the abdomen exposure was adequate, and the Bookwalter retractor was utilized  The fundus of the gallbladder was completely necrotic and this was dissected off of the of the whole liver as much as possible with the electrocautery, portion of the back wall the fundus of the gallbladder was left in situ and this was fulgurated with the electrocautery to destroy the mucosa  Dissection proceeded proximally, the triangle of Calot was eventually able to be discerned and the tissue in the triangle of Calot was developed into a pedicle and this was divided and the cystic artery controlled with the suture ligature of 2 0 Vicryl  The cystic duct was eventually identified and intraoperative cholangiogram with a taut cholangiogram catheter and 17 cc of Omnipaque was performed    This showed a remnant of the cystic duct, and intact proximal and distal biliary tree with prompt emptying into the duodenum  The cystic duct was then ligated with a 2 0 Vicryl ligature with the addition of an Endoclip  This being completed the field was copiously irrigated with the sterile saline  A 15 Cuban round BONILLA drain was placed via the lateral subcostal port site and this was directed into the Morison's pouch running along the gallbladder fossa to secured to the skin using a 2 0 nylon drain stitch  After final inspection for hemostasis the incision was closed in layers  The posterior rectus sheath and peritoneum closed with a continuous 2 0 chromic suture, the anterior rectus sheath closed with a continuous 1  PDS suture  The subcutaneous tissue was then irrigated and closed using skin clips  The patient subsequently made an uneventful recovery from his anesthetic  A Mepilex dressing was applied  He was then moved to his waiting stretcher and taken to the recovery room in good condition having tolerated the procedure well       I was present for the entire procedure and A qualified resident physician was not available    Patient Disposition:  Critical Care Unit    SIGNATURE: Hazel Corley MD  DATE: February 7, 2020  TIME: 6:53 PM

## 2020-02-07 NOTE — ANESTHESIA PREPROCEDURE EVALUATION
Review of Systems/Medical History  Patient summary reviewed  Chart reviewed  No history of anesthetic complications     Cardiovascular  EKG reviewed, Hyperlipidemia, Hypertension , CAD , Cardiac stents    Comment: Study date:  01-Apr-2019  SUMMARY     LEFT VENTRICLE:  Systolic function was normal  Ejection fraction was estimated to be 60 %  There were no regional wall motion abnormalities  There was mild concentric hypertrophy      LEFT ATRIUM:  The atrium was dilated      MITRAL VALVE:  There was mild annular calcification      TRICUSPID VALVE:  There was mild regurgitation  The findings suggest mild pulmonary hypertension      HISTORY: Syncope  PRIOR HISTORY: CAD Neuroendocrine tumor Risk factors: hypertension, diabetes, renal failure, and hypercholesterolemia  Chemotherapy   PRIOR PROCEDURES: Stent ,  Pulmonary       GI/Hepatic    GERD , Liver disease (metastatic) , history of liver cancer, Pancreatic problem,        Kidney disease CKD, Chronic kidney disease ,        Endo/Other  Diabetes poorly controlled type 2 Insulin,      GYN       Hematology  Anemia ,     Musculoskeletal    Arthritis     Neurology   Psychology           Physical Exam    Airway    Mallampati score: II  TM Distance: >3 FB  Neck ROM: full     Dental   Comment: Multiple missing and no reported loose,     Cardiovascular  Cardiovascular exam normal    Pulmonary  Pulmonary exam normal     Other Findings      Results from last 7 days   Lab Units 02/07/20 0537 02/05/20 1952   WBC Thousand/uL 12 50* 12 20*   HEMOGLOBIN g/dL 9 3* 10 5*   HEMATOCRIT % 29 0* 32 8*   PLATELETS Thousands/uL 202 201     Results from last 7 days   Lab Units 02/07/20 0537 02/05/20 1951   SODIUM mmol/L 136 130*   POTASSIUM mmol/L 4 3 3 9   CO2 mmol/L 16* 19*   CHLORIDE mmol/L 109* 98   BUN mg/dL 36* 50*   CREATININE mg/dL 2 60* 3 07*   EGFR ml/min/1 73sq m 22 18     Results from last 7 days   Lab Units 02/05/20 1952   INR  1 21   PROTIME seconds 14 1*     Type and Screen:  Results from last 7 days   Lab Units 02/06/20  1019   ABO GROUPING  A   RH FACTOR  Positive   ANTIBODY SCREEN  Negative   SPECIMEN EXPIRATION DATE  [id number]     Anesthesia Plan  ASA Score- 4     Anesthesia Type- general with ASA Monitors  Additional Monitors:   Airway Plan: ETT  Plan Factors-    Induction- intravenous  Postoperative Plan- Plan for postoperative opioid use  Informed Consent- Anesthetic plan and risks discussed with patient  I personally reviewed this patient with the CRNA  Discussed and agreed on the Anesthesia Plan with the CRNA  Kinsey Johnston

## 2020-02-07 NOTE — PHYSICAL THERAPY NOTE
Physical Therapy Cancellation Note    Chart review performed  At this time, PT treatment session cancelled as patient is tent schedule for OR procedure per RN report "at any minute"  PT will follow and provide PT interventions as appropriate      Kim Epstein PT

## 2020-02-08 PROBLEM — E87.0 HYPERNATREMIA: Status: ACTIVE | Noted: 2020-02-08

## 2020-02-08 LAB
ALBUMIN SERPL BCP-MCNC: 2.6 G/DL (ref 3.5–5.7)
ALP SERPL-CCNC: 79 U/L (ref 55–165)
ALT SERPL W P-5'-P-CCNC: 15 U/L (ref 7–52)
ANION GAP SERPL CALCULATED.3IONS-SCNC: 15 MMOL/L (ref 4–13)
AST SERPL W P-5'-P-CCNC: 17 U/L (ref 13–39)
BACTERIA UR QL AUTO: ABNORMAL /HPF
BASOPHILS # BLD AUTO: 0 THOUSANDS/ΜL (ref 0–0.1)
BASOPHILS NFR BLD AUTO: 0 % (ref 0–2)
BILIRUB SERPL-MCNC: 0.4 MG/DL (ref 0.2–1)
BILIRUB UR QL STRIP: NEGATIVE
BUN SERPL-MCNC: 33 MG/DL (ref 7–25)
CALCIUM SERPL-MCNC: 8.2 MG/DL (ref 8.6–10.5)
CHLORIDE SERPL-SCNC: 112 MMOL/L (ref 98–107)
CLARITY UR: ABNORMAL
CO2 SERPL-SCNC: 18 MMOL/L (ref 21–31)
COLOR UR: YELLOW
CREAT SERPL-MCNC: 2.79 MG/DL (ref 0.7–1.3)
EOSINOPHIL # BLD AUTO: 0 THOUSAND/ΜL (ref 0–0.61)
EOSINOPHIL NFR BLD AUTO: 0 % (ref 0–5)
ERYTHROCYTE [DISTWIDTH] IN BLOOD BY AUTOMATED COUNT: 15.4 % (ref 11.5–14.5)
GFR SERPL CREATININE-BSD FRML MDRD: 20 ML/MIN/1.73SQ M
GLUCOSE SERPL-MCNC: 183 MG/DL (ref 65–140)
GLUCOSE SERPL-MCNC: 213 MG/DL (ref 65–140)
GLUCOSE SERPL-MCNC: 216 MG/DL (ref 65–140)
GLUCOSE SERPL-MCNC: 220 MG/DL (ref 65–99)
GLUCOSE SERPL-MCNC: 243 MG/DL (ref 65–140)
GLUCOSE UR STRIP-MCNC: NEGATIVE MG/DL
HCT VFR BLD AUTO: 26.6 % (ref 42–47)
HGB BLD-MCNC: 8.6 G/DL (ref 14–18)
HGB UR QL STRIP.AUTO: ABNORMAL
KETONES UR STRIP-MCNC: NEGATIVE MG/DL
LEUKOCYTE ESTERASE UR QL STRIP: ABNORMAL
LYMPHOCYTES # BLD AUTO: 0.7 THOUSANDS/ΜL (ref 0.6–4.47)
LYMPHOCYTES NFR BLD AUTO: 5 % (ref 21–51)
MCH RBC QN AUTO: 29.4 PG (ref 26–34)
MCHC RBC AUTO-ENTMCNC: 32.2 G/DL (ref 31–37)
MCV RBC AUTO: 91 FL (ref 81–99)
MONOCYTES # BLD AUTO: 0.9 THOUSAND/ΜL (ref 0.17–1.22)
MONOCYTES NFR BLD AUTO: 6 % (ref 2–12)
NEUTROPHILS # BLD AUTO: 12.5 THOUSANDS/ΜL (ref 1.4–6.5)
NEUTS SEG NFR BLD AUTO: 88 % (ref 42–75)
NITRITE UR QL STRIP: POSITIVE
NON-SQ EPI CELLS URNS QL MICRO: ABNORMAL /HPF
OTHER STN SPEC: ABNORMAL
PH UR STRIP.AUTO: 5.5 [PH]
PLATELET # BLD AUTO: 244 THOUSANDS/UL (ref 149–390)
PMV BLD AUTO: 8.7 FL (ref 8.6–11.7)
POTASSIUM SERPL-SCNC: 4.4 MMOL/L (ref 3.5–5.5)
PROT SERPL-MCNC: 5.3 G/DL (ref 6.4–8.9)
PROT UR STRIP-MCNC: ABNORMAL MG/DL
RBC # BLD AUTO: 2.91 MILLION/UL (ref 4.3–5.9)
RBC #/AREA URNS AUTO: ABNORMAL /HPF
SODIUM SERPL-SCNC: 145 MMOL/L (ref 134–143)
SP GR UR STRIP.AUTO: 1.01 (ref 1–1.03)
UROBILINOGEN UR QL STRIP.AUTO: 0.2 E.U./DL
WBC # BLD AUTO: 14.2 THOUSAND/UL (ref 4.8–10.8)
WBC #/AREA URNS AUTO: ABNORMAL /HPF

## 2020-02-08 PROCEDURE — 82570 ASSAY OF URINE CREATININE: CPT | Performed by: INTERNAL MEDICINE

## 2020-02-08 PROCEDURE — 99223 1ST HOSP IP/OBS HIGH 75: CPT | Performed by: INTERNAL MEDICINE

## 2020-02-08 PROCEDURE — 82043 UR ALBUMIN QUANTITATIVE: CPT | Performed by: INTERNAL MEDICINE

## 2020-02-08 PROCEDURE — 87086 URINE CULTURE/COLONY COUNT: CPT | Performed by: INTERNAL MEDICINE

## 2020-02-08 PROCEDURE — 81001 URINALYSIS AUTO W/SCOPE: CPT | Performed by: INTERNAL MEDICINE

## 2020-02-08 PROCEDURE — 84300 ASSAY OF URINE SODIUM: CPT | Performed by: INTERNAL MEDICINE

## 2020-02-08 PROCEDURE — 82948 REAGENT STRIP/BLOOD GLUCOSE: CPT

## 2020-02-08 PROCEDURE — 99232 SBSQ HOSP IP/OBS MODERATE 35: CPT | Performed by: INTERNAL MEDICINE

## 2020-02-08 PROCEDURE — 85025 COMPLETE CBC W/AUTO DIFF WBC: CPT | Performed by: PHYSICIAN ASSISTANT

## 2020-02-08 PROCEDURE — 80053 COMPREHEN METABOLIC PANEL: CPT | Performed by: PHYSICIAN ASSISTANT

## 2020-02-08 PROCEDURE — 99024 POSTOP FOLLOW-UP VISIT: CPT | Performed by: SPECIALIST

## 2020-02-08 PROCEDURE — 84156 ASSAY OF PROTEIN URINE: CPT | Performed by: INTERNAL MEDICINE

## 2020-02-08 RX ORDER — SODIUM CHLORIDE 450 MG/100ML
100 INJECTION, SOLUTION INTRAVENOUS CONTINUOUS
Status: DISCONTINUED | OUTPATIENT
Start: 2020-02-08 | End: 2020-02-09

## 2020-02-08 RX ADMIN — HEPARIN SODIUM 5000 UNITS: 5000 INJECTION INTRAVENOUS; SUBCUTANEOUS at 14:30

## 2020-02-08 RX ADMIN — COLCHICINE 0.6 MG: 0.6 TABLET, FILM COATED ORAL at 08:45

## 2020-02-08 RX ADMIN — TAMSULOSIN HYDROCHLORIDE 0.4 MG: 0.4 CAPSULE ORAL at 16:31

## 2020-02-08 RX ADMIN — PIPERACILLIN SODIUM AND TAZOBACTAM SODIUM 3.38 G: 3; .375 INJECTION, POWDER, LYOPHILIZED, FOR SOLUTION INTRAVENOUS at 00:51

## 2020-02-08 RX ADMIN — HEPARIN SODIUM 5000 UNITS: 5000 INJECTION INTRAVENOUS; SUBCUTANEOUS at 05:43

## 2020-02-08 RX ADMIN — OXYCODONE HYDROCHLORIDE AND ACETAMINOPHEN 1 TABLET: 5; 325 TABLET ORAL at 08:46

## 2020-02-08 RX ADMIN — VITAMIN D, TAB 1000IU (100/BT) 1000 UNITS: 25 TAB at 08:45

## 2020-02-08 RX ADMIN — HEPARIN SODIUM 5000 UNITS: 5000 INJECTION INTRAVENOUS; SUBCUTANEOUS at 21:43

## 2020-02-08 RX ADMIN — AMLODIPINE BESYLATE 10 MG: 5 TABLET ORAL at 08:45

## 2020-02-08 RX ADMIN — SODIUM CHLORIDE 100 ML/HR: 9 INJECTION, SOLUTION INTRAVENOUS at 04:06

## 2020-02-08 RX ADMIN — INSULIN LISPRO 2 UNITS: 100 INJECTION, SOLUTION INTRAVENOUS; SUBCUTANEOUS at 11:52

## 2020-02-08 RX ADMIN — PIPERACILLIN SODIUM AND TAZOBACTAM SODIUM 2.25 G: 2; .25 INJECTION, POWDER, LYOPHILIZED, FOR SOLUTION INTRAVENOUS at 18:03

## 2020-02-08 RX ADMIN — Medication 250 MG: at 08:45

## 2020-02-08 RX ADMIN — Medication 250 MG: at 18:03

## 2020-02-08 RX ADMIN — INSULIN LISPRO 2 UNITS: 100 INJECTION, SOLUTION INTRAVENOUS; SUBCUTANEOUS at 17:55

## 2020-02-08 RX ADMIN — Medication 1 CAPSULE: at 16:31

## 2020-02-08 RX ADMIN — OXYCODONE HYDROCHLORIDE AND ACETAMINOPHEN 2 TABLET: 5; 325 TABLET ORAL at 16:32

## 2020-02-08 RX ADMIN — INSULIN LISPRO 2 UNITS: 100 INJECTION, SOLUTION INTRAVENOUS; SUBCUTANEOUS at 09:23

## 2020-02-08 RX ADMIN — OXYCODONE HYDROCHLORIDE AND ACETAMINOPHEN 1 TABLET: 5; 325 TABLET ORAL at 04:11

## 2020-02-08 RX ADMIN — SODIUM CHLORIDE 100 ML/HR: 4.5 INJECTION, SOLUTION INTRAVENOUS at 19:52

## 2020-02-08 RX ADMIN — PIPERACILLIN SODIUM AND TAZOBACTAM SODIUM 3.38 G: 3; .375 INJECTION, POWDER, LYOPHILIZED, FOR SOLUTION INTRAVENOUS at 11:51

## 2020-02-08 RX ADMIN — SODIUM BICARBONATE 650 MG: 650 TABLET ORAL at 08:45

## 2020-02-08 RX ADMIN — SODIUM BICARBONATE 650 MG: 650 TABLET ORAL at 16:31

## 2020-02-08 RX ADMIN — PIPERACILLIN SODIUM AND TAZOBACTAM SODIUM 3.38 G: 3; .375 INJECTION, POWDER, LYOPHILIZED, FOR SOLUTION INTRAVENOUS at 05:42

## 2020-02-08 RX ADMIN — OXYCODONE HYDROCHLORIDE AND ACETAMINOPHEN 1 TABLET: 5; 325 TABLET ORAL at 11:52

## 2020-02-08 RX ADMIN — PANTOPRAZOLE SODIUM 40 MG: 40 TABLET, DELAYED RELEASE ORAL at 05:43

## 2020-02-08 RX ADMIN — INSULIN LISPRO 1 UNITS: 100 INJECTION, SOLUTION INTRAVENOUS; SUBCUTANEOUS at 21:44

## 2020-02-08 RX ADMIN — SODIUM CHLORIDE 100 ML/HR: 4.5 INJECTION, SOLUTION INTRAVENOUS at 08:46

## 2020-02-08 RX ADMIN — OXYCODONE HYDROCHLORIDE AND ACETAMINOPHEN 1 TABLET: 5; 325 TABLET ORAL at 00:16

## 2020-02-08 NOTE — PROGRESS NOTES
Progress Note - Radha Lugo 1940, [de-identified] y o  male MRN: [id number]    Unit/Bed#: ICU 03 Encounter: [id number]    Primary Care Provider: Alma Herzog DO   Date and time admitted to hospital: 2020 10:50 AM        * Acute cholecystitis  Assessment & Plan  · Postop day 1 status post laparoscopic cholecystectomy  · Continue IV antibiotics  · Pain control as needed  · Currently on IV fluids, adjusted to half-normal saline due to hypernatremia  · Continue further management per surgical team    Acute renal failure superimposed on stage 3 chronic kidney disease ([address] )  Assessment & Plan  · Will continue gentle hydration  · Avoid any Nephro toxic meds  · Nephrology consultation  · Haile catheter in place    Hypernatremia  Assessment & Plan  · Will adjust fluids to half-normal saline  · Monitor sodium level  · Consult nephrology    Neuroendocrine tumor  Assessment & Plan  · Patient currently on outpatient treatment with Oncology  · Continue outpatient follow-up  · Supportive care    Chronic indwelling Haile catheter  Assessment & Plan  · Continue Flomax      VTE Pharmacologic Prophylaxis: Pharmacologic: Heparin    Patient Centered Rounds: I have performed bedside rounds with nursing staff today  Discussions with Specialists or Other Care Team Provider: yes  Education and Discussions with Family / Patient: yes    Current Length of Stay: 3 day(s)    Current Patient Status: Inpatient   Certification Statement: The patient will continue to require additional inpatient hospital stay due to Cholecystitis    Discharge Plan:  Per primary team    Code Status: Level 1 - Full Code    Subjective:   Patient postop day 1 status post laparoscopic cholecystectomy  Intermittent abdominal pain  Denies any fever or chills      Objective:     Vitals:   Temp (24hrs), Av 3 °F (36 3 °C), Min:96 8 °F (36 °C), Max:97 9 °F (36 6 °C)    Temp:  [96 8 °F (36 °C)-97 9 °F (36 6 °C)] 97 9 °F (36 6 °C)  HR:  [61-72] 72  Resp: [0-24] 16  BP: (116-161)/(56-73) 149/69  SpO2:  [95 %-100 %] 99 %  Body mass index is 24 2 kg/m²  Input and Output Summary (last 24 hours): Intake/Output Summary (Last 24 hours) at 2/8/2020 1055  Last data filed at 2/8/2020 0829  Gross per 24 hour   Intake 2756 67 ml   Output 1650 ml   Net 1106 67 ml       Physical Exam:     Physical Exam   Constitutional: No distress  Frail elderly male   HENT:   Head: Normocephalic and atraumatic  Eyes: Conjunctivae and EOM are normal    Neck: Normal range of motion  Neck supple  Cardiovascular: Normal rate and regular rhythm  Pulmonary/Chest: Effort normal  No respiratory distress  Abdominal:   Abdominal incisions with dressing in place  Drain noted   Genitourinary:   Genitourinary Comments: Haile catheter in place   Musculoskeletal:   Generalized weakness   Neurological: He is alert  No cranial nerve deficit  Skin: Skin is warm and dry  Psychiatric: He has a normal mood and affect  His behavior is normal        Additional Data:     Labs:    Results from last 7 days   Lab Units 02/08/20  0449   WBC Thousand/uL 14 20*   HEMOGLOBIN g/dL 8 6*   HEMATOCRIT % 26 6*   PLATELETS Thousands/uL 244   NEUTROS PCT % 88*   LYMPHS PCT % 5*   MONOS PCT % 6   EOS PCT % 0     Results from last 7 days   Lab Units 02/08/20  0449   POTASSIUM mmol/L 4 4   CHLORIDE mmol/L 112*   CO2 mmol/L 18*   BUN mg/dL 33*   CREATININE mg/dL 2 79*   CALCIUM mg/dL 8 2*   ALK PHOS U/L 79   ALT U/L 15   AST U/L 17     Results from last 7 days   Lab Units 02/05/20  1952   INR  1 21     Results from last 7 days   Lab Units 02/08/20  0819 02/07/20  2303 02/07/20  1928 02/07/20  1057 02/07/20  0609 02/06/20  2051 02/06/20  1635 02/06/20  1123 02/06/20  0658 02/05/20  2038   POC GLUCOSE mg/dl 213* 238* 218* 194* 211* 275* 328* 281* 238* 436*           * I Have Reviewed All Lab Data Listed Above  * Additional Pertinent Lab Tests Reviewed:  Laura 65 Admission Reviewed    Imaging:  Imaging Reports Reviewed Today Include:  No new imaging    Recent Cultures (last 7 days):     Results from last 7 days   Lab Units 02/03/20  1427   GRAM STAIN RESULT  3+ Polys*  3+ Gram positive cocci in pairs and chains*   BODY FLUID CULTURE, STERILE  Few Colonies of - Presumptive Pantoea species*  4+ Growth of Enterococcus faecalis*  2+ Growth of Staphylococcus aureus*       Last 24 Hours Medication List:     Current Facility-Administered Medications:  acetaminophen 650 mg Oral Q6H PRN Nallely Leon, PA-C    amLODIPine 10 mg Oral Daily Nallelymehdi eLon, PA-C    b complex-vitamin C-folic acid 1 capsule Oral Daily With [employer] RL Valadez-C    cholecalciferol 1,000 Units Oral Daily Nallely Leon, PA-C    colchicine 0 6 mg Oral Daily Librachristine Valadez, PA-C    docusate sodium 100 mg Oral BID PRN Nallelymehdi Leon, PA-C    heparin (porcine) 5,000 Units Subcutaneous [facility] Nallely Leon, PA-C    HYDROmorphone 0 5 mg Intravenous Q4H PRN Nallelymehdi Leon, PA-C    insulin lispro 1-5 Units Subcutaneous TID AC Libra Valadez, PA-C    insulin lispro 1-5 Units Subcutaneous HS Nallelymehdi Leon, PA-C    ondansetron 4 mg Intravenous Q6H PRN Nallelymehdi Leon, PA-C    oxyCODONE-acetaminophen 1 tablet Oral Q4H PRN Nallely Dace, PA-C    oxyCODONE-acetaminophen 2 tablet Oral Q4H PRN Nallely Dacstephanie, PA-C    pantoprazole 40 mg Oral Early Morning Nallelymehdi Leon, PA-C    piperacillin-tazobactam 3 375 g Intravenous Q6H Nallely Dace, PA-C Last Rate: 3 375 g (02/08/20 0542)   saccharomyces boulardii 250 mg Oral BID Nallelymehdi Leon, PA-C    simethicone 80 mg Oral Q6H PRN Nallely Dace, PA-C    sodium bicarbonate 650 mg Oral BID after meals Nallelymehdi Leon, PA-C    sodium chloride (PF) 10 mL Intravenous Once per day on Mon Thu RL Ham-C    sodium chloride 100 mL/hr Intravenous Continuous Sommer Alvarez MD Last Rate: 100 mL/hr (02/08/20 0846)   tamsulosin 0 4 mg Oral Daily With Andria Gonzales PA-C         Today, Patient Was Seen By: Claudia Martinez Inez Lane MD    ** Please Note: Dictation voice to text software may have been used in the creation of this document   **

## 2020-02-08 NOTE — ANESTHESIA POSTPROCEDURE EVALUATION
Post-Op Assessment Note    CV Status:  Stable    Pain management: adequate     Mental Status:  Awake   Hydration Status:  Stable   PONV Controlled:  Controlled   Airway Patency:  Patent   Post Op Vitals Reviewed: Yes      Staff: Anesthesiologist, CRNA           /56 (02/07/20 1902)    Temp (!) 96 8 °F (36 °C) (02/07/20 1902)    Pulse 65 (02/07/20 1902)   Resp 18 (02/07/20 1902)    SpO2 97 % (02/07/20 1902)

## 2020-02-08 NOTE — PROGRESS NOTES
Progress Note - General Surgery   Daniel Durán [de-identified] y o  male MRN: [id number]  Unit/Bed#: ICU 03 Encounter: [id number]    Assessment:  POD 1 from lap converted to open cholecystectomy  Complains of incisional pain  WBC 14 2  Hgb 8 6  20 ml of serosanguinous output from BONILLA this morning    Plan:  Nephrology managing IV fluids  Decrease Zosyn to 3 375 gm q6 hr  Encourage cough and deep breathing    Subjective/Objective   Chief Complaint: Incisional Pain    Subjective: Sitting up in Bed, chatting with family, picking at lunch tray    Objective:     Blood pressure 142/82, pulse 72, temperature 97 7 °F (36 5 °C), temperature source Temporal, resp  rate (!) 26, height 5' 8" (1 727 m), weight 72 2 kg (159 lb 2 8 oz), SpO2 99 %  ,Body mass index is 24 2 kg/m²  Intake/Output Summary (Last 24 hours) at 2/8/2020 1318  Last data filed at 2/8/2020 1005  Gross per 24 hour   Intake 2756 67 ml   Output 1850 ml   Net 906 67 ml       Invasive Devices     Peripheral Intravenous Line            Peripheral IV 02/05/20 Left Forearm 3 days          Drain            Urethral Catheter 18 Fr  46 days    Closed/Suction Drain Right Abdomen Bulb 15 Fr  less than 1 day                Physical Exam:   Pulm:  Clear bilaterally  Abd:  Serosanguinous drainage from BONILLA, Dressings intact, incision clean and dry    Lab, Imaging and other studies:  I have personally reviewed pertinent lab results    , CBC:   Lab Results   Component Value Date    WBC 14 20 (H) 02/08/2020    HGB 8 6 (L) 02/08/2020    HCT 26 6 (L) 02/08/2020    MCV 91 02/08/2020     02/08/2020    MCH 29 4 02/08/2020    MCHC 32 2 02/08/2020    RDW 15 4 (H) 02/08/2020    MPV 8 7 02/08/2020   , CMP:   Lab Results   Component Value Date    SODIUM 145 (H) 02/08/2020    K 4 4 02/08/2020     (H) 02/08/2020    CO2 18 (L) 02/08/2020    BUN 33 (H) 02/08/2020    CREATININE 2 79 (H) 02/08/2020    CALCIUM 8 2 (L) 02/08/2020    AST 17 02/08/2020    ALT 15 02/08/2020    ALKPHOS 79 02/08/2020 EGFR 20 02/08/2020     VTE Pharmacologic Prophylaxis: Heparin  VTE Mechanical Prophylaxis: sequential compression device

## 2020-02-08 NOTE — CONSULTS
Consultation - Nephrology   Enma Heredia [de-identified] y o  male MRN: [id number]  Unit/Bed#: ICU 03 Encounter: [id number]      A/P:  1  Acute kidney injury:  Baseline creatinine is approximately 2 1  He nazario to 3 07 due to volume depletion and sepsis due to acute cholecystitis  He is improving in IVF  Will check urine studies  2  Chronic kidney disease stage 4: baseline creatinine as above  He has diabetes mellitus and a neuroendocrine carcinoma for which he receives monthly injections of Lanreotide by oncology  3  Hypernatremia: He has hypernatremic metabolic acidosis  Will continue 1/2 normal saline and increase sodium bicarbonate dose from outpatient 65o bid to 1300 bid and continue to monitor  4  DM with stage 4 CKD with long term insulin use: continue to cover sugars  5  Essential hypertension: follow blood pressures       Thank you for allowing us to participate in the care of your patient  Please feel free to contact us regarding the care of this patient, or any other questions/concerns that may be applicable      Patient Active Problem List   Diagnosis    Weakness generalized    Type 2 diabetes 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, with long-term current use of insulin ([address] )    Essential hypertension    Mixed hyperlipidemia    Cardiac disease    Acute renal failure superimposed on stage 3 chronic kidney disease (HCC)    Hydroureter    Metabolic acidosis    Iron deficiency anemia secondary to inadequate dietary iron intake    Accelerated hypertension    Liver mass    Neuroendocrine tumor    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)    Pressure injury of right heel, unstageable ([address] )    Atherosclerosis of artery of extremity with ulceration (HCC)    Carcinoid syndrome (HCC)    Chronic indwelling Haile catheter    Moderate protein-calorie malnutrition (HCC)    Biliary sludge    Malignant neuroendocrine neoplasm (HCC)    Urinary retention    Cholecystostomy tube dysfunction  Acute pancreatitis without infection or necrosis    UTI due to extended-spectrum beta lactamase (ESBL) producing Escherichia coli    CKD (chronic kidney disease) stage 3, GFR 30-59 ml/min (HCC)    Anemia    Acute cholecystitis    Shortness of breath    Goals of care, counseling/discussion    Hypernatremia       History of Present Illness   Physician Requesting Consult: Zain Vázquez MD  Reason for Consult / Principal Problem: chronic kidney disease  Hx and PE limited by:   HPI: Tri Cervantes is a [de-identified]y o  year old male who presents with acute cholecystitis  He underwent on February 7th  His creatinine on admission was 3 07  However with IV fluids and relief of his infection it has dropped to 2 79  Was seen in the office on January 17th  He has a history of CKD 4 with a baseline creatinine between 1 9-2 4 mg/dL  He has a history of chronic metabolic acidosis and take sodium bicarbonate 650 p o  B i d  Does have a history of urinary retention has a chronic indwelling Haile catheter  He took methenamine hippurate and Flomax periodically  And takes Flomax now regularly  He has a history of hypertension associated with CKD 3/4  Of note is that he has a neuroendocrine mcarcinoma with mets to the liver  He receives Lanreotide injections 120mg every 4 weeks  His last dose was Jan 14, 2020  He follows with oncology and last visit was Jan 7, 2020  He had a percutaneous cholecystostomy tube changed in IR in December but presented with acute cholecystitis  He has history of MD -2 associated with CKD 4    History obtained from chart review and the patient    Review of Systems - Negative except as mentioned above in HPI, more specifics as mentioned below    Review of Systems - General ROS: positive for  - fatigue  Ophthalmic ROS: negative  ENT ROS: negative  Respiratory ROS: positive for - shortness of breath  Cardiovascular ROS: no chest pain or dyspnea on exertion  Gastrointestinal ROS: positive for - abdominal pain, appetite loss and diarrhea  Genito-Urinary ROS: positive for - chronic garcia  Musculoskeletal ROS: positive for - muscular weakness  Neurological ROS: no TIA or stroke symptoms  Dermatological ROS: negative    Historical Information   Past Medical History:   Diagnosis Date    Acute pancreatitis without infection or necrosis 9/26/2019    Anemia of chronic renal failure     BPH (benign prostatic hyperplasia)     Cancer (HCC)     liver cancer    Cardiac disease     Chronic kidney disease, stage 3 (HCC)     Coronary artery disease     Diabetes mellitus [facility]     DJD (degenerative joint disease)     Essential hypertension     Gait disturbance     Generalized weakness     GERD (gastroesophageal reflux disease)     Gout     History of transfusion     Hydronephrosis     Hyperlipidemia     Hypertension     [facility] cancer [facility]     Pressure injury of skin     Proteinuria     Renal disorder     Retention of urine     Thrombophlebitis migrans     Type 2 diabetes mellitus (HCC)     Type 2 diabetes 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, with long-term current use of insulin ([place] [id number] ) 1/23/2019     Past Surgical History:   Procedure Laterality Date    CORONARY ANGIOPLASTY WITH STENT PLACEMENT      IR IMAGE GUIDED BIOPSY/ASPIRATION LIVER  1/24/2019    IR TUBE PLACEMENT ROQUE  9/6/2019     Social History   Social History     Substance and Sexual Activity   Alcohol Use Never    Frequency: Never     Social History     Substance and Sexual Activity   Drug Use Never     Social History     Tobacco Use   Smoking Status Never Smoker   Smokeless Tobacco Never Used     Family History   Problem Relation Age of Onset    Cancer Mother     Hypertension Father     Cancer Brother     Cancer Maternal Grandmother     Cancer Paternal Aunt        Meds/Allergies   all current active meds have been reviewed, current meds:   Current Facility-Administered Medications   Medication Dose Route Frequency    acetaminophen (TYLENOL) tablet 650 mg  650 mg Oral Q6H PRN    amLODIPine (NORVASC) tablet 10 mg  10 mg Oral Daily    b complex-vitamin C-folic acid (NEPHROCAPS) capsule 1 capsule  1 capsule Oral Daily With Dinner    cholecalciferol (VITAMIN D3) tablet 1,000 Units  1,000 Units Oral Daily    colchicine (COLCRYS) tablet 0 6 mg  0 6 mg Oral Daily    docusate sodium (COLACE) capsule 100 mg  100 mg Oral BID PRN    heparin (porcine) subcutaneous injection 5,000 Units  5,000 Units Subcutaneous Q8H [facility]    HYDROmorphone (DILAUDID) injection 0 5 mg  0 5 mg Intravenous Q4H PRN    insulin lispro (HumaLOG) 100 units/mL subcutaneous injection 1-5 Units  1-5 Units Subcutaneous TID AC    insulin lispro (HumaLOG) 100 units/mL subcutaneous injection 1-5 Units  1-5 Units Subcutaneous HS    ondansetron (ZOFRAN) injection 4 mg  4 mg Intravenous Q6H PRN    oxyCODONE-acetaminophen (PERCOCET) 5-325 mg per tablet 1 tablet  1 tablet Oral Q4H PRN    oxyCODONE-acetaminophen (PERCOCET) 5-325 mg per tablet 2 tablet  2 tablet Oral Q4H PRN    pantoprazole (PROTONIX) EC tablet 40 mg  40 mg Oral Early Morning    piperacillin-tazobactam (ZOSYN) 3 375 g in sodium chloride 0 9 % 100 mL IVPB  3 375 g Intravenous Q6H    saccharomyces boulardii (FLORASTOR) capsule 250 mg  250 mg Oral BID    simethicone (MYLICON) chewable tablet 80 mg  80 mg Oral Q6H PRN    sodium bicarbonate tablet 650 mg  650 mg Oral BID after meals    sodium chloride (PF) 0 9 % injection 10 mL  10 mL Intravenous Once per day on Mon Thu    sodium chloride infusion 0 45 %  100 mL/hr Intravenous Continuous    tamsulosin (FLOMAX) capsule 0 4 mg  0 4 mg Oral Daily With Dinner    and PTA meds:    Medications Prior to Admission   Medication    amLODIPine (NORVASC) 10 mg tablet    aspirin 81 MG tablet    b complex-vitamin C-folic acid (NEPHROCAPS) 1 mg capsule    cholecalciferol (VITAMIN D3) 1,000 units tablet    colchicine (COLCRYS) 0 6 mg tablet    insulin detemir (LEVEMIR) 100 units/mL subcutaneous injection    insulin lispro (HumaLOG) 100 units/mL injection    methenamine hippurate (HIPREX) 1 g tablet    pantoprazole (PROTONIX) 40 mg tablet    saccharomyces boulardii (FLORASTOR) 250 mg capsule    simethicone (MYLICON) 80 mg chewable tablet    sodium bicarbonate 650 mg tablet    tamsulosin (FLOMAX) 0 4 mg    acetaminophen (TYLENOL) 325 mg tablet    Insulin Syringe-Needle U-100 30G X 1/2" 1 ML MISC    oxyCODONE-acetaminophen (PERCOCET) 5-325 mg per tablet    sodium chloride, PF, 0 9 %         No Known Allergies    Objective     Intake/Output Summary (Last 24 hours) at 2/8/2020 1206  Last data filed at 2/8/2020 0829  Gross per 24 hour   Intake 2756 67 ml   Output 1650 ml   Net 1106 67 ml       Invasive Devices:   Urethral Catheter 18 Fr  (Active)   Reasons to continue Urinary Catheter  Chronic urinary catheter 2/7/2020  8:00 AM   Goal for Removal N/A- chronic garcia 2/5/2020  8:33 PM   Site Assessment Clean 2/7/2020  7:25 PM   Collection Container Standard drainage bag 2/7/2020  8:00 AM   Securement Method Securing device (Describe) 2/7/2020  8:00 AM   Output (mL) 50 mL 2/8/2020  6:01 AM       Physical Exam      I/O last 3 completed shifts: In: 2756 7 [I V :2556 7; IV Piggyback:200]  Out: 6211 [Urine:4075; Drains:80; Blood:150]    Vitals:    02/08/20 0829   BP:    Pulse: 72   Resp: 16   Temp:    SpO2: 99%       Gen: frail and weak Alert/Awake  HEENT: no sclerous icterus, MMM, neck supple  CV: +S1/S2, RRR  Lungs: CTA bilaterally  Abd: +BS, soft NT/ND  Ext: all four extremities are warm  Skin: no rashes noted  Neuro: CN II-XII intact    Current Weight: Weight - Scale: 72 2 kg (159 lb 2 8 oz)(no SCD pump or  garcia on bed)  First Weight: Weight - Scale: 67 8 kg (149 lb 9 3 oz)    Lab Results:  I have personally reviewed pertinent labs      CBC:   Lab Results   Component Value Date    WBC 14 20 (H) 02/08/2020    HGB 8 6 (L) 02/08/2020    HCT 26 6 (L) 02/08/2020    MCV 91 02/08/2020     02/08/2020    MCH 29 4 02/08/2020    MCHC 32 2 02/08/2020    RDW 15 4 (H) 02/08/2020    MPV 8 7 02/08/2020     CMP:   Lab Results   Component Value Date    K 4 4 02/08/2020     (H) 02/08/2020    CO2 18 (L) 02/08/2020    BUN 33 (H) 02/08/2020    CREATININE 2 79 (H) 02/08/2020    CALCIUM 8 2 (L) 02/08/2020    AST 17 02/08/2020    ALT 15 02/08/2020    ALKPHOS 79 02/08/2020    EGFR 20 02/08/2020     Phosphorus: No results found for: PHOS  Magnesium: No results found for: MG  Urinalysis: No results found for: Smita Safer, SPECGRAV, PHUR, LEUKOCYTESUR, NITRITE, PROTEINUA, GLUCOSEU, KETONESU, BILIRUBINUR, BLOODU  Ionized Calcium: No results found for: CAION  Coagulation: No results found for: PT, INR, APTT  Troponin: No results found for: TROPONINI  ABG: No results found for: PHART, VRZ7WRE, PO2ART, UYO3UCF, P5TPZUVQ, BEART, SOURCE    Results from last 7 days   Lab Units 02/08/20  0449 02/07/20  0537 02/05/20  1951   POTASSIUM mmol/L 4 4 4 3 3 9   CHLORIDE mmol/L 112* 109* 98   CO2 mmol/L 18* 16* 19*   BUN mg/dL 33* 36* 50*   CREATININE mg/dL 2 79* 2 60* 3 07*   CALCIUM mg/dL 8 2* 7 9* 8 4*   ALK PHOS U/L 79 89 101   ALT U/L 15 13 10   AST U/L 17 24 7*       Radiology review:  Procedure: Xr Cholangiogram Intraoperative    Result Date: 2/8/2020  Narrative: INTRAOPERATIVE CHOLANGIOGRAM INDICATION:   cholangiogram  COMPARISON:  None VIEWS:  XR CHOLANGIOGRAM INTRAOPERATIVE 2 FLUOROSCOPIC IMAGES FLUOROSCOPY TIME:   24 3 seconds CONTRAST:  12 mL of iohexol (OMNIPAQUE) FINDINGS: Fluoroscopic guidance provided during performance of intraoperative cholangiogram  CBD is normal caliber  No persistent filling defects are identified  Contrast reaches the duodenum uneventfully Osseous and soft tissue detail limited by technique  Impression: No filling defects  Please see procedure report for further details   Workstation performed: [id number]         EKG, Pathology, and Other Studies: I have reviewed all labs, prior office notes and Xrays      Counseling / Coordination of Care  Total ADDITIONAL floor / unit time spent today 40 minutes  Greater than 50% of total time was spent with the patient and / or family counseling and / or coordination of care  A description of the counseling / coordination of care: follows    Shasta Louie MD      This consultation note was produced in part using a dictation device which may document imprecise wording from author's original intent

## 2020-02-08 NOTE — ASSESSMENT & PLAN NOTE
· Will continue gentle hydration  · Avoid any Nephro toxic meds  · Nephrology consultation  · Haile catheter in place

## 2020-02-08 NOTE — SOCIAL WORK
Pt not yet stable for discharge, remains in ICU  Home PT recommended  CM will follow to didcuss same with pt and family on dishcarge

## 2020-02-08 NOTE — ASSESSMENT & PLAN NOTE
· Postop day 1 status post laparoscopic cholecystectomy  · Continue IV antibiotics  · Pain control as needed  · Currently on IV fluids, adjusted to half-normal saline due to hypernatremia  · Continue further management per surgical team

## 2020-02-08 NOTE — PLAN OF CARE
Problem: Potential for Falls  Goal: Patient will remain free of falls  Description  INTERVENTIONS:  - Assess patient frequently for physical needs  -  Identify cognitive and physical deficits and behaviors that affect risk of falls    -  [place] fall precautions as indicated by assessment   - Educate patient/family on patient safety including physical limitations  - Instruct patient to call for assistance with activity based on assessment  - Modify environment to reduce risk of injury  - Consider OT/PT consult to assist with strengthening/mobility  Outcome: Progressing     Problem: PAIN - ADULT  Goal: Verbalizes/displays adequate comfort level or baseline comfort level  Description  Interventions:  - Encourage patient to monitor pain and request assistance  - Assess pain using appropriate pain scale  - Administer analgesics based on type and severity of pain and evaluate response  - Implement non-pharmacological measures as appropriate and evaluate response  - Consider cultural and social influences on pain and pain management  - Notify physician/advanced practitioner if interventions unsuccessful or patient reports new pain  Outcome: Progressing     Problem: INFECTION - ADULT  Goal: Absence or prevention of progression during hospitalization  Description  INTERVENTIONS:  - Assess and monitor for signs and symptoms of infection  - Monitor lab/diagnostic results  - Monitor all insertion sites, i e  indwelling lines, tubes, and drains  - Monitor endotracheal if appropriate and nasal secretions for changes in amount and color  - [place] appropriate cooling/warming therapies per order  - Administer medications as ordered  - Instruct and encourage patient and family to use good hand hygiene technique  - Identify and instruct in appropriate isolation precautions for identified infection/condition  Outcome: Progressing  Goal: Absence of fever/infection during neutropenic period  Description  INTERVENTIONS:  - Monitor WBC    Outcome: Progressing     Problem: SAFETY ADULT  Goal: Patient will remain free of falls  Description  INTERVENTIONS:  - Assess patient frequently for physical needs  -  Identify cognitive and physical deficits and behaviors that affect risk of falls    -  [place] fall precautions as indicated by assessment   - Educate patient/family on patient safety including physical limitations  - Instruct patient to call for assistance with activity based on assessment  - Modify environment to reduce risk of injury  - Consider OT/PT consult to assist with strengthening/mobility  Outcome: Progressing  Goal: Maintain or return to baseline ADL function  Description  INTERVENTIONS:  -  Assess patient's ability to carry out ADLs; assess patient's baseline for ADL function and identify physical deficits which impact ability to perform ADLs (bathing, care of mouth/teeth, toileting, grooming, dressing, etc )  - Assess/evaluate cause of self-care deficits   - Assess range of motion  - Assess patient's mobility; develop plan if impaired  - Assess patient's need for assistive devices and provide as appropriate  - Encourage maximum independence but intervene and supervise when necessary  - Involve family in performance of ADLs  - Assess for home care needs following discharge   - Consider OT consult to assist with ADL evaluation and planning for discharge  - Provide patient education as appropriate  Outcome: Progressing  Goal: Maintain or return mobility status to optimal level  Description  INTERVENTIONS:  - Assess patient's baseline mobility status (ambulation, transfers, stairs, etc )    - Identify cognitive and physical deficits and behaviors that affect mobility  - Identify mobility aids required to assist with transfers and/or ambulation (gait belt, sit-to-stand, lift, walker, cane, etc )  - [place] fall precautions as indicated by assessment  - Record patient progress and toleration of activity level on Mobility SBAR; progress patient to next Phase/Stage  - Instruct patient to call for assistance with activity based on assessment  - Consider rehabilitation consult to assist with strengthening/weightbearing, etc   Outcome: Progressing     Problem: DISCHARGE PLANNING  Goal: Discharge to home or other facility with appropriate resources  Description  INTERVENTIONS:  - Identify barriers to discharge w/patient and caregiver  - Arrange for needed discharge resources and transportation as appropriate  - Identify discharge learning needs (meds, wound care, etc )  - Arrange for interpretive services to assist at discharge as needed  - Refer to Case Management Department for coordinating discharge planning if the patient needs post-hospital services based on physician/advanced practitioner order or complex needs related to functional status, cognitive ability, or social support system  Outcome: Progressing     Problem: Knowledge Deficit  Goal: Patient/family/caregiver demonstrates understanding of disease process, treatment plan, medications, and discharge instructions  Description  Complete learning assessment and assess knowledge base    Interventions:  - Provide teaching at level of understanding  - Provide teaching via preferred learning methods  Outcome: Progressing     Problem: Prexisting or High Potential for Compromised Skin Integrity  Goal: Skin integrity is maintained or improved  Description  INTERVENTIONS:  - Identify patients at risk for skin breakdown  - Assess and monitor skin integrity  - Assess and monitor nutrition and hydration status  - Monitor labs   - Assess for incontinence   - Turn and reposition patient  - Assist with mobility/ambulation  - Relieve pressure over bony prominences  - Avoid friction and shearing  - Provide appropriate hygiene as needed including keeping skin clean and dry  - Evaluate need for skin moisturizer/barrier cream  - Collaborate with interdisciplinary team   - Patient/family teaching  - Consider wound care consult   Outcome: Progressing     Problem: DISCHARGE PLANNING - CARE MANAGEMENT  Goal: Discharge to post-acute care or home with appropriate resources  Description  INTERVENTIONS:  - Conduct assessment to determine patient/family and health care team treatment goals, and need for post-acute services based on payer coverage, community resources, and patient preferences, and barriers to discharge  - Address psychosocial, clinical, and financial barriers to discharge as identified in assessment in conjunction with the patient/family and health care team  - Arrange appropriate level of post-acute services according to patient's   needs and preference and payer coverage in collaboration with the physician and health care team  - Communicate with and update the patient/family, physician, and health care team regarding progress on the discharge plan  - Arrange appropriate transportation to post-acute venues    Need to reassess    Outcome: Progressing     Problem: Potential for Falls  Goal: Patient will remain free of falls  Description  INTERVENTIONS:  - Assess patient frequently for physical needs  -  Identify cognitive and physical deficits and behaviors that affect risk of falls    -  [place] fall precautions as indicated by assessment   - Educate patient/family on patient safety including physical limitations  - Instruct patient to call for assistance with activity based on assessment  - Modify environment to reduce risk of injury  - Consider OT/PT consult to assist with strengthening/mobility  Outcome: Progressing     Problem: PAIN - ADULT  Goal: Verbalizes/displays adequate comfort level or baseline comfort level  Description  Interventions:  - Encourage patient to monitor pain and request assistance  - Assess pain using appropriate pain scale  - Administer analgesics based on type and severity of pain and evaluate response  - Implement non-pharmacological measures as appropriate and evaluate response  - Consider cultural and social influences on pain and pain management  - Notify physician/advanced practitioner if interventions unsuccessful or patient reports new pain  Outcome: Progressing     Problem: INFECTION - ADULT  Goal: Absence or prevention of progression during hospitalization  Description  INTERVENTIONS:  - Assess and monitor for signs and symptoms of infection  - Monitor lab/diagnostic results  - Monitor all insertion sites, i e  indwelling lines, tubes, and drains  - Monitor endotracheal if appropriate and nasal secretions for changes in amount and color  - [place] appropriate cooling/warming therapies per order  - Administer medications as ordered  - Instruct and encourage patient and family to use good hand hygiene technique  - Identify and instruct in appropriate isolation precautions for identified infection/condition  Outcome: Progressing  Goal: Absence of fever/infection during neutropenic period  Description  INTERVENTIONS:  - Monitor WBC    Outcome: Progressing     Problem: SAFETY ADULT  Goal: Patient will remain free of falls  Description  INTERVENTIONS:  - Assess patient frequently for physical needs  -  Identify cognitive and physical deficits and behaviors that affect risk of falls    -  [place] fall precautions as indicated by assessment   - Educate patient/family on patient safety including physical limitations  - Instruct patient to call for assistance with activity based on assessment  - Modify environment to reduce risk of injury  - Consider OT/PT consult to assist with strengthening/mobility  Outcome: Progressing  Goal: Maintain or return to baseline ADL function  Description  INTERVENTIONS:  -  Assess patient's ability to carry out ADLs; assess patient's baseline for ADL function and identify physical deficits which impact ability to perform ADLs (bathing, care of mouth/teeth, toileting, grooming, dressing, etc )  - Assess/evaluate cause of self-care deficits   - Assess range of motion  - Assess patient's mobility; develop plan if impaired  - Assess patient's need for assistive devices and provide as appropriate  - Encourage maximum independence but intervene and supervise when necessary  - Involve family in performance of ADLs  - Assess for home care needs following discharge   - Consider OT consult to assist with ADL evaluation and planning for discharge  - Provide patient education as appropriate  Outcome: Progressing  Goal: Maintain or return mobility status to optimal level  Description  INTERVENTIONS:  - Assess patient's baseline mobility status (ambulation, transfers, stairs, etc )    - Identify cognitive and physical deficits and behaviors that affect mobility  - Identify mobility aids required to assist with transfers and/or ambulation (gait belt, sit-to-stand, lift, walker, cane, etc )  - [place] fall precautions as indicated by assessment  - Record patient progress and toleration of activity level on Mobility SBAR; progress patient to next Phase/Stage  - Instruct patient to call for assistance with activity based on assessment  - Consider rehabilitation consult to assist with strengthening/weightbearing, etc   Outcome: Progressing     Problem: DISCHARGE PLANNING  Goal: Discharge to home or other facility with appropriate resources  Description  INTERVENTIONS:  - Identify barriers to discharge w/patient and caregiver  - Arrange for needed discharge resources and transportation as appropriate  - Identify discharge learning needs (meds, wound care, etc )  - Arrange for interpretive services to assist at discharge as needed  - Refer to Case Management Department for coordinating discharge planning if the patient needs post-hospital services based on physician/advanced practitioner order or complex needs related to functional status, cognitive ability, or social support system  Outcome: Progressing     Problem: Knowledge Deficit  Goal: Patient/family/caregiver demonstrates understanding of disease process, treatment plan, medications, and discharge instructions  Description  Complete learning assessment and assess knowledge base    Interventions:  - Provide teaching at level of understanding  - Provide teaching via preferred learning methods  Outcome: Progressing     Problem: Prexisting or High Potential for Compromised Skin Integrity  Goal: Skin integrity is maintained or improved  Description  INTERVENTIONS:  - Identify patients at risk for skin breakdown  - Assess and monitor skin integrity  - Assess and monitor nutrition and hydration status  - Monitor labs   - Assess for incontinence   - Turn and reposition patient  - Assist with mobility/ambulation  - Relieve pressure over bony prominences  - Avoid friction and shearing  - Provide appropriate hygiene as needed including keeping skin clean and dry  - Evaluate need for skin moisturizer/barrier cream  - Collaborate with interdisciplinary team   - Patient/family teaching  - Consider wound care consult   Outcome: Progressing     Problem: DISCHARGE PLANNING - CARE MANAGEMENT  Goal: Discharge to post-acute care or home with appropriate resources  Description  INTERVENTIONS:  - Conduct assessment to determine patient/family and health care team treatment goals, and need for post-acute services based on payer coverage, community resources, and patient preferences, and barriers to discharge  - Address psychosocial, clinical, and financial barriers to discharge as identified in assessment in conjunction with the patient/family and health care team  - Arrange appropriate level of post-acute services according to patient's   needs and preference and payer coverage in collaboration with the physician and health care team  - Communicate with and update the patient/family, physician, and health care team regarding progress on the discharge plan  - Arrange appropriate transportation to post-acute venues    Need to reassess    Outcome: Progressing

## 2020-02-09 LAB
ANION GAP SERPL CALCULATED.3IONS-SCNC: 11 MMOL/L (ref 4–13)
BACTERIA UR CULT: NORMAL
BASOPHILS # BLD AUTO: 0 THOUSANDS/ΜL (ref 0–0.1)
BASOPHILS NFR BLD AUTO: 0 % (ref 0–2)
BUN SERPL-MCNC: 29 MG/DL (ref 7–25)
CALCIUM SERPL-MCNC: 8.2 MG/DL (ref 8.6–10.5)
CHLORIDE SERPL-SCNC: 108 MMOL/L (ref 98–107)
CO2 SERPL-SCNC: 18 MMOL/L (ref 21–31)
CREAT SERPL-MCNC: 2.7 MG/DL (ref 0.7–1.3)
CREAT UR-MCNC: 38.5 MG/DL
EOSINOPHIL # BLD AUTO: 0.1 THOUSAND/ΜL (ref 0–0.61)
EOSINOPHIL NFR BLD AUTO: 1 % (ref 0–5)
ERYTHROCYTE [DISTWIDTH] IN BLOOD BY AUTOMATED COUNT: 15.8 % (ref 11.5–14.5)
GFR SERPL CREATININE-BSD FRML MDRD: 21 ML/MIN/1.73SQ M
GLUCOSE SERPL-MCNC: 176 MG/DL (ref 65–140)
GLUCOSE SERPL-MCNC: 186 MG/DL (ref 65–99)
GLUCOSE SERPL-MCNC: 245 MG/DL (ref 65–140)
GLUCOSE SERPL-MCNC: 265 MG/DL (ref 65–140)
GLUCOSE SERPL-MCNC: 267 MG/DL (ref 65–140)
HCT VFR BLD AUTO: 26 % (ref 42–47)
HGB BLD-MCNC: 8.5 G/DL (ref 14–18)
LYMPHOCYTES # BLD AUTO: 0.9 THOUSANDS/ΜL (ref 0.6–4.47)
LYMPHOCYTES NFR BLD AUTO: 7 % (ref 21–51)
MAGNESIUM SERPL-MCNC: 1.4 MG/DL (ref 1.9–2.7)
MCH RBC QN AUTO: 29.5 PG (ref 26–34)
MCHC RBC AUTO-ENTMCNC: 32.5 G/DL (ref 31–37)
MCV RBC AUTO: 91 FL (ref 81–99)
MICROALBUMIN UR-MCNC: 35.3 MG/L (ref 0–20)
MICROALBUMIN/CREAT 24H UR: 92 MG/G CREATININE (ref 0–30)
MONOCYTES # BLD AUTO: 1.1 THOUSAND/ΜL (ref 0.17–1.22)
MONOCYTES NFR BLD AUTO: 9 % (ref 2–12)
NEUTROPHILS # BLD AUTO: 10.4 THOUSANDS/ΜL (ref 1.4–6.5)
NEUTS SEG NFR BLD AUTO: 83 % (ref 42–75)
PHOSPHATE SERPL-MCNC: 3.8 MG/DL (ref 3–5.5)
PLATELET # BLD AUTO: 224 THOUSANDS/UL (ref 149–390)
PMV BLD AUTO: 8.2 FL (ref 8.6–11.7)
POTASSIUM SERPL-SCNC: 3.6 MMOL/L (ref 3.5–5.5)
PROT UR-MCNC: 65 MG/DL
PROT/CREAT UR: 1.69 MG/G{CREAT} (ref 0–0.1)
RBC # BLD AUTO: 2.86 MILLION/UL (ref 4.3–5.9)
SODIUM 24H UR-SCNC: 77 MOL/L
SODIUM SERPL-SCNC: 137 MMOL/L (ref 134–143)
WBC # BLD AUTO: 12.5 THOUSAND/UL (ref 4.8–10.8)

## 2020-02-09 PROCEDURE — 99232 SBSQ HOSP IP/OBS MODERATE 35: CPT | Performed by: INTERNAL MEDICINE

## 2020-02-09 PROCEDURE — 82948 REAGENT STRIP/BLOOD GLUCOSE: CPT

## 2020-02-09 PROCEDURE — 83735 ASSAY OF MAGNESIUM: CPT | Performed by: INTERNAL MEDICINE

## 2020-02-09 PROCEDURE — 99024 POSTOP FOLLOW-UP VISIT: CPT | Performed by: SPECIALIST

## 2020-02-09 PROCEDURE — 85025 COMPLETE CBC W/AUTO DIFF WBC: CPT | Performed by: SPECIALIST

## 2020-02-09 PROCEDURE — 80048 BASIC METABOLIC PNL TOTAL CA: CPT | Performed by: SPECIALIST

## 2020-02-09 PROCEDURE — 84100 ASSAY OF PHOSPHORUS: CPT | Performed by: INTERNAL MEDICINE

## 2020-02-09 RX ORDER — SODIUM CHLORIDE, SODIUM GLUCONATE, SODIUM ACETATE, POTASSIUM CHLORIDE, MAGNESIUM CHLORIDE, SODIUM PHOSPHATE, DIBASIC, AND POTASSIUM PHOSPHATE .53; .5; .37; .037; .03; .012; .00082 G/100ML; G/100ML; G/100ML; G/100ML; G/100ML; G/100ML; G/100ML
75 INJECTION, SOLUTION INTRAVENOUS CONTINUOUS
Status: DISCONTINUED | OUTPATIENT
Start: 2020-02-09 | End: 2020-02-11

## 2020-02-09 RX ORDER — BISACODYL 10 MG
10 SUPPOSITORY, RECTAL RECTAL ONCE
Status: COMPLETED | OUTPATIENT
Start: 2020-02-09 | End: 2020-02-09

## 2020-02-09 RX ORDER — MAGNESIUM SULFATE HEPTAHYDRATE 40 MG/ML
2 INJECTION, SOLUTION INTRAVENOUS ONCE
Status: COMPLETED | OUTPATIENT
Start: 2020-02-09 | End: 2020-02-09

## 2020-02-09 RX ADMIN — PIPERACILLIN SODIUM AND TAZOBACTAM SODIUM 2.25 G: 2; .25 INJECTION, POWDER, LYOPHILIZED, FOR SOLUTION INTRAVENOUS at 05:57

## 2020-02-09 RX ADMIN — PANTOPRAZOLE SODIUM 40 MG: 40 TABLET, DELAYED RELEASE ORAL at 05:57

## 2020-02-09 RX ADMIN — AMLODIPINE BESYLATE 10 MG: 5 TABLET ORAL at 08:50

## 2020-02-09 RX ADMIN — SODIUM CHLORIDE, SODIUM GLUCONATE, SODIUM ACETATE, POTASSIUM CHLORIDE, MAGNESIUM CHLORIDE, SODIUM PHOSPHATE, DIBASIC, AND POTASSIUM PHOSPHATE 75 ML/HR: .53; .5; .37; .037; .03; .012; .00082 INJECTION, SOLUTION INTRAVENOUS at 13:47

## 2020-02-09 RX ADMIN — SODIUM BICARBONATE 650 MG: 650 TABLET ORAL at 16:44

## 2020-02-09 RX ADMIN — TAMSULOSIN HYDROCHLORIDE 0.4 MG: 0.4 CAPSULE ORAL at 16:44

## 2020-02-09 RX ADMIN — PIPERACILLIN SODIUM AND TAZOBACTAM SODIUM 2.25 G: 2; .25 INJECTION, POWDER, LYOPHILIZED, FOR SOLUTION INTRAVENOUS at 17:39

## 2020-02-09 RX ADMIN — PIPERACILLIN SODIUM AND TAZOBACTAM SODIUM 2.25 G: 2; .25 INJECTION, POWDER, LYOPHILIZED, FOR SOLUTION INTRAVENOUS at 12:18

## 2020-02-09 RX ADMIN — Medication 1 CAPSULE: at 16:45

## 2020-02-09 RX ADMIN — INSULIN LISPRO 2 UNITS: 100 INJECTION, SOLUTION INTRAVENOUS; SUBCUTANEOUS at 21:13

## 2020-02-09 RX ADMIN — SODIUM CHLORIDE 100 ML/HR: 4.5 INJECTION, SOLUTION INTRAVENOUS at 06:01

## 2020-02-09 RX ADMIN — BISACODYL 10 MG: 10 SUPPOSITORY RECTAL at 12:18

## 2020-02-09 RX ADMIN — HEPARIN SODIUM 5000 UNITS: 5000 INJECTION INTRAVENOUS; SUBCUTANEOUS at 14:03

## 2020-02-09 RX ADMIN — SODIUM BICARBONATE 650 MG: 650 TABLET ORAL at 08:49

## 2020-02-09 RX ADMIN — HEPARIN SODIUM 5000 UNITS: 5000 INJECTION INTRAVENOUS; SUBCUTANEOUS at 05:57

## 2020-02-09 RX ADMIN — OXYCODONE HYDROCHLORIDE AND ACETAMINOPHEN 2 TABLET: 5; 325 TABLET ORAL at 03:54

## 2020-02-09 RX ADMIN — INSULIN LISPRO 1 UNITS: 100 INJECTION, SOLUTION INTRAVENOUS; SUBCUTANEOUS at 07:55

## 2020-02-09 RX ADMIN — PIPERACILLIN SODIUM AND TAZOBACTAM SODIUM 2.25 G: 2; .25 INJECTION, POWDER, LYOPHILIZED, FOR SOLUTION INTRAVENOUS at 00:35

## 2020-02-09 RX ADMIN — MAGNESIUM SULFATE IN WATER 2 G: 40 INJECTION, SOLUTION INTRAVENOUS at 09:02

## 2020-02-09 RX ADMIN — VITAMIN D, TAB 1000IU (100/BT) 1000 UNITS: 25 TAB at 08:49

## 2020-02-09 RX ADMIN — INSULIN LISPRO 2 UNITS: 100 INJECTION, SOLUTION INTRAVENOUS; SUBCUTANEOUS at 16:43

## 2020-02-09 RX ADMIN — COLCHICINE 0.6 MG: 0.6 TABLET, FILM COATED ORAL at 08:50

## 2020-02-09 RX ADMIN — HEPARIN SODIUM 5000 UNITS: 5000 INJECTION INTRAVENOUS; SUBCUTANEOUS at 21:14

## 2020-02-09 RX ADMIN — Medication 250 MG: at 17:39

## 2020-02-09 RX ADMIN — INSULIN LISPRO 2 UNITS: 100 INJECTION, SOLUTION INTRAVENOUS; SUBCUTANEOUS at 11:45

## 2020-02-09 RX ADMIN — Medication 250 MG: at 08:49

## 2020-02-09 NOTE — NURSING NOTE
Patient Transferred from ICU to [facility], Report received from prior ICU RN  Patient bed low, breaks on, 2 rails up, call bell and personal items with in reach, family at bedside  Pt denies any needs and offers no complaints at this time

## 2020-02-09 NOTE — NURSING NOTE
Off unit via bed to transfer to [facility]  Wife at bedside  Report given to Shahram MCCAIN  All valuables sent with patient

## 2020-02-09 NOTE — NURSING NOTE
AM assessment completed  Breath sounds clear ; diminished to lower bases  Encouraged to deep breath and cough  Incentive spirometry goal 1000; noted up to 1250  Post op day 2 ; Biliary tube removal with open cholecystectomy  Abdominal dressing intact with auscultated bowel sounds  No nausea after eating breakfast  No pain meds requested/needed for this AM  VSS; heart regular SR with noted bigeminy PVC's on monitor  No chest discomfort or SOB  Positive pulses upper and lower; no edema  B/L venadynes inplace as per protocol  Repositioning as needed with wedge/pillows to prop  Rails up times 3 with callbell in reach

## 2020-02-09 NOTE — ASSESSMENT & PLAN NOTE
· Will continue gentle hydration  · Avoid any Nephro toxic meds  · Will follow-up BMP today  · Nephrology consultation appreciated  · Haile catheter in place

## 2020-02-09 NOTE — PROGRESS NOTES
Progress Note - General Surgery   Yuko Gilman [de-identified] y o  male MRN: [id number]  Unit/Bed#: ICU 03 Encounter: [id number]    Assessment:  POD 2 Open Cholecystectomy  Tolerating PO, no flatus or BM yet  WBC 12 5, Creat 2 7, Hgb8 5  I < O's    Plan:  Transfer to [facility]  Maintain BONILLA drain  Continue Zosyn  Physical Therapy  Dulcolax suppository today    Subjective/Objective   Chief Complaint: Incisional Pain    Subjective: In good spirits    Objective:     Blood pressure 136/76, pulse 76, temperature 97 6 °F (36 4 °C), temperature source Temporal, resp  rate 16, height 5' 8" (1 727 m), weight 72 2 kg (159 lb 2 8 oz), SpO2 98 %  ,Body mass index is 24 2 kg/m²  Intake/Output Summary (Last 24 hours) at 2/9/2020 1020  Last data filed at 2/9/2020 0643  Gross per 24 hour   Intake 2776 66 ml   Output 1125 ml   Net 1651 66 ml       Invasive Devices     Peripheral Intravenous Line            Peripheral IV 02/05/20 Left Forearm 3 days          Drain            Urethral Catheter 18 Fr  47 days    Closed/Suction Drain Right Abdomen Bulb 15 Fr  1 day                Physical Exam:   Pulm: Clear  CVS: NSR with PVC's  Abd:  Incisions clean and dry, scant serosanguinous output from BONILLA  With Normal Bowel Sounds      Lab, Imaging and other studies:  CBC:   Lab Results   Component Value Date    WBC 12 50 (H) 02/09/2020    HGB 8 5 (L) 02/09/2020    HCT 26 0 (L) 02/09/2020    MCV 91 02/09/2020     02/09/2020    MCH 29 5 02/09/2020    MCHC 32 5 02/09/2020    RDW 15 8 (H) 02/09/2020    MPV 8 2 (L) 02/09/2020   , CMP:   Lab Results   Component Value Date    SODIUM 137 02/09/2020    K 3 6 02/09/2020     (H) 02/09/2020    CO2 18 (L) 02/09/2020    BUN 29 (H) 02/09/2020    CREATININE 2 70 (H) 02/09/2020    CALCIUM 8 2 (L) 02/09/2020    EGFR 21 02/09/2020     VTE Pharmacologic Prophylaxis: Heparin  VTE Mechanical Prophylaxis: sequential compression device

## 2020-02-09 NOTE — PROGRESS NOTES
Progress Note - Nephrology   Radha Lugo [de-identified] y o  male MRN: [id number]  Unit/Bed#: ICU 03 Encounter: [id number]    A/P:  1  Acute kidney injury:  Baseline creatinine approximately 2 1 with a rise to 3 07  He is improving his creatinine has dropped to 2 7 today  He is starting to eat and drink and receiving IV fluids  2  Chronic kidney disease stage 4 based:  Baseline creatinine as above  He has longstanding diabetes mellitus and neuroendocrine carcinoma for which she receives monthly injections of [place] type by Oncology  3  Hypernatremic metabolic acidosis:  Resolving  Serum sodium is 137 today  He has a nonanion gap metabolic acidosis  Would continue to monitor as he just started eating and has hypoalbuminemia  If bicarbonate drops further would use oral bicarbonate  Change fluids to Plasmalyte from half-normal saline at a lower level  4  DM with stage 4 CKD with long term insulin use: sugars being monitored  5  Essential hypertension: blood pressure in acceptable range  6   Acute cholecystitis:  Status post stage II of a laparoscopic cholecystectomy and tolerating it well       Follow up reason for today's visit:     Acute cholecystitis    Patient Active Problem List   Diagnosis    Weakness generalized    Type 2 diabetes 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, with long-term current use of insulin ([address] )    Essential hypertension    Mixed hyperlipidemia    Cardiac disease    Acute renal failure superimposed on stage 3 chronic kidney disease (HCC)    Hydroureter    Metabolic acidosis    Iron deficiency anemia secondary to inadequate dietary iron intake    Accelerated hypertension    Liver mass    Neuroendocrine tumor    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)    Pressure injury of right heel, unstageable ([address] )    Atherosclerosis of artery of extremity with ulceration (HCC)    Carcinoid syndrome (HCC)    Chronic indwelling Haile catheter    Moderate protein-calorie malnutrition [facility]    Biliary sludge    Malignant neuroendocrine neoplasm ([facility] )    Urinary retention    Cholecystostomy tube dysfunction    Acute pancreatitis without infection or necrosis    UTI due to extended-spectrum beta lactamase (ESBL) producing Escherichia coli    CKD (chronic kidney disease) stage 3, GFR 30-59 ml/min ([facility])    Hypomagnesemia    Anemia    Acute cholecystitis    Shortness of breath    Goals of care, counseling/discussion    Hypernatremia         Subjective:   Looking and feeling well  Has postop pain in his right upper quadrant but denies chest pain shortness of breath or abdominal pain he has Garcia drainage    Objective:     Vitals: Blood pressure 136/76, pulse 76, temperature 97 6 °F (36 4 °C), temperature source Temporal, resp  rate 16, height 5' 8" (1 727 m), weight 72 2 kg (159 lb 2 8 oz), SpO2 98 %  ,Body mass index is 24 2 kg/m²  Weight (last 2 days)     Date/Time   Weight    02/08/20 0500   72 2 (159 17) no SCD pump or  garcia on bed    Weight: no SCD pump or  garcia on bed at 02/08/20 0500                Intake/Output Summary (Last 24 hours) at 2/9/2020 1330  Last data filed at 2/9/2020 0643  Gross per 24 hour   Intake 2536 66 ml   Output 1125 ml   Net 1411 66 ml     I/O last 3 completed shifts: In: 3933 3 [P O :480; I V :3103 3; IV Piggyback:350]  Out: 9051 [Urine:1725; Drains:150]    Urethral Catheter 18 Fr   (Active)   Reasons to continue Urinary Catheter  Chronic urinary catheter 2/8/2020  8:01 PM   Goal for Removal N/A- chronic garcia 2/8/2020  8:01 PM   Site Assessment Clean 2/8/2020  8:01 PM   Collection Container Standard drainage bag 2/8/2020  8:01 PM   Securement Method Securing device (Describe) 2/8/2020  8:01 PM   Output (mL) 200 mL 2/9/2020  6:01 AM       Physical Exam: /76   Pulse 76   Temp 97 6 °F (36 4 °C) (Temporal)   Resp 16   Ht 5' 8" (1 727 m)   Wt 72 2 kg (159 lb 2 8 oz) Comment: no SCD pump or  garcia on bed  SpO2 98%   BMI 24 20 kg/m² General Appearance:    Alert, cooperative, no distress, appears stated age   Head:    Normocephalic, without obvious abnormality, atraumatic   Eyes:    Conjunctiva/corneas clear   Ears:    Normal external ears   Nose:   Nares normal, septum midline, mucosa normal, no drainage    or sinus tenderness   Throat:   Lips, mucosa, and tongue normal; teeth and gums normal   Neck:   Supple, symmetrical, trachea midline, no adenopathy;        thyroid:  No enlargement/tenderness/nodules; no carotid    bruit or JVD   Back:     Symmetric, no curvature, ROM normal, no CVA tenderness   Lungs:     Clear to auscultation bilaterally, respirations unlabored   Chest wall:    No tenderness or deformity   Heart:    Regular rate and rhythm, S1 and S2 normal, no murmur, rub   or gallop   Abdomen:     Soft, tender, bowel sounds active   Extremities:   Extremities normal, atraumatic, no cyanosis or edema   Skin:   Skin color, texture, turgor normal, no rashes or lesions   Lymph nodes:   Cervical normal   Neurologic:   CNII-XII intact            Lab, Imaging and other studies: I have personally reviewed pertinent labs  CBC:   Lab Results   Component Value Date    WBC 12 50 (H) 02/09/2020    HGB 8 5 (L) 02/09/2020    HCT 26 0 (L) 02/09/2020    MCV 91 02/09/2020     02/09/2020    MCH 29 5 02/09/2020    MCHC 32 5 02/09/2020    RDW 15 8 (H) 02/09/2020    MPV 8 2 (L) 02/09/2020     CMP:   Lab Results   Component Value Date    K 3 6 02/09/2020     (H) 02/09/2020    CO2 18 (L) 02/09/2020    BUN 29 (H) 02/09/2020    CREATININE 2 70 (H) 02/09/2020    CALCIUM 8 2 (L) 02/09/2020    EGFR 21 02/09/2020           Results from last 7 days   Lab Units 02/09/20  0559 02/08/20  0449 02/07/20  0537 02/05/20  1951   POTASSIUM mmol/L 3 6 4 4 4 3 3 9   CHLORIDE mmol/L 108* 112* 109* 98   CO2 mmol/L 18* 18* 16* 19*   BUN mg/dL 29* 33* 36* 50*   CREATININE mg/dL 2 70* 2 79* 2 60* 3 07*   CALCIUM mg/dL 8 2* 8 2* 7 9* 8 4*   ALK PHOS U/L  --  79 89 101   ALT U/L  --  15 13 10   AST U/L  --  17 24 7*         Phosphorus:   Lab Results   Component Value Date    PHOS 3 8 02/09/2020     Magnesium:   Lab Results   Component Value Date    MG 1 4 (L) 02/09/2020     Urinalysis:   Lab Results   Component Value Date    COLORU Yellow 02/08/2020    CLARITYU Cloudy (A) 02/08/2020    SPECGRAV 1 015 02/08/2020    PHUR 5 5 02/08/2020    LEUKOCYTESUR 1+ (A) 02/08/2020    NITRITE Positive (A) 02/08/2020    GLUCOSEU Negative 02/08/2020    KETONESU Negative 02/08/2020    BILIRUBINUR Negative 02/08/2020    BLOODU 1+ (A) 02/08/2020     Ionized Calcium: No results found for: CAION  Coagulation: No results found for: PT, INR, APTT  Troponin: No results found for: TROPONINI  ABG: No results found for: PHART, VAU7JOJ, PO2ART, HBB9KAJ, R9YOEYAN, BEART, SOURCE  Radiology review:     IMAGING  Procedure: Xr Cholangiogram Intraoperative    Result Date: 2/8/2020  Narrative: INTRAOPERATIVE CHOLANGIOGRAM INDICATION:   cholangiogram  COMPARISON:  None VIEWS:  XR CHOLANGIOGRAM INTRAOPERATIVE 2 FLUOROSCOPIC IMAGES FLUOROSCOPY TIME:   24 3 seconds CONTRAST:  12 mL of iohexol (OMNIPAQUE) FINDINGS: Fluoroscopic guidance provided during performance of intraoperative cholangiogram  CBD is normal caliber  No persistent filling defects are identified  Contrast reaches the duodenum uneventfully Osseous and soft tissue detail limited by technique  Impression: No filling defects  Please see procedure report for further details   Workstation performed: [id number]       Current Facility-Administered Medications   Medication Dose Route Frequency    acetaminophen (TYLENOL) tablet 650 mg  650 mg Oral Q6H PRN    amLODIPine (NORVASC) tablet 10 mg  10 mg Oral Daily    b complex-vitamin C-folic acid (NEPHROCAPS) capsule 1 capsule  1 capsule Oral Daily With Dinner    cholecalciferol (VITAMIN D3) tablet 1,000 Units  1,000 Units Oral Daily    colchicine (COLCRYS) tablet 0 6 mg  0 6 mg Oral Daily    docusate sodium (COLACE) capsule 100 mg  100 mg Oral BID PRN    heparin (porcine) subcutaneous injection 5,000 Units  5,000 Units Subcutaneous Q8H [facility]    HYDROmorphone (DILAUDID) injection 0 5 mg  0 5 mg Intravenous Q4H PRN    insulin lispro (HumaLOG) 100 units/mL subcutaneous injection 1-5 Units  1-5 Units Subcutaneous TID AC    insulin lispro (HumaLOG) 100 units/mL subcutaneous injection 1-5 Units  1-5 Units Subcutaneous HS    ondansetron (ZOFRAN) injection 4 mg  4 mg Intravenous Q6H PRN    oxyCODONE-acetaminophen (PERCOCET) 5-325 mg per tablet 1 tablet  1 tablet Oral Q4H PRN    oxyCODONE-acetaminophen (PERCOCET) 5-325 mg per tablet 2 tablet  2 tablet Oral Q4H PRN    pantoprazole (PROTONIX) EC tablet 40 mg  40 mg Oral Early Morning    piperacillin-tazobactam (ZOSYN) 2 25 g in sodium chloride 0 9 % 50 mL IVPB  2 25 g Intravenous Q6H    saccharomyces boulardii (FLORASTOR) capsule 250 mg  250 mg Oral BID    simethicone (MYLICON) chewable tablet 80 mg  80 mg Oral Q6H PRN    sodium bicarbonate tablet 650 mg  650 mg Oral BID after meals    sodium chloride (PF) 0 9 % injection 10 mL  10 mL Intravenous Once per day on Mon Thu    sodium chloride infusion 0 45 %  100 mL/hr Intravenous Continuous    tamsulosin (FLOMAX) capsule 0 4 mg  0 4 mg Oral Daily With Dinner     Medications Discontinued During This Encounter   Medication Reason    morphine injection 2 mg     sodium chloride (PF) 0 9 % injection 10 mL     bupivacaine-epinephrine (PF) (MARCAINE/EPINEPHRINE PF) 0 25 %-1:[id number] injection Patient Discharge    iohexol (OMNIPAQUE) 300 mg/mL injection Patient Discharge    sodium chloride 0 9 % irrigation solution Patient Discharge    sodium chloride 0 9 % irrigation solution Patient Discharge    morphine injection 2 mg     HYDROmorphone (DILAUDID) injection 0 4 mg Patient Transfer    sodium chloride 0 9 % infusion     piperacillin-tazobactam (ZOSYN) 3 375 g in sodium chloride 0 9 % 100 mL IVPB        Erica Green Gretchen Bermudez MD      This progress note was produced in part using a dictation device which may document imprecise wording from author's original intent

## 2020-02-09 NOTE — ASSESSMENT & PLAN NOTE
· Postop day 2 status post laparoscopic cholecystectomy  · Continue IV antibiotics  · Pain control as needed  · Currently on IV fluids, adjusted to half-normal saline due to hypernatremia  · Continue further management per surgical team

## 2020-02-09 NOTE — ASSESSMENT & PLAN NOTE
· Currently on half-normal saline  · Will follow-up BMP today  · Monitor sodium level  · Nephrology input appreciated

## 2020-02-09 NOTE — PROGRESS NOTES
Progress Note - Juancho Paredes 1940, [de-identified] y o  male MRN: [id number]    Unit/Bed#: ICU 03 Encounter: [id number]    Primary Care Provider: Oumar De La Vega DO   Date and time admitted to hospital: 2/5/2020 10:50 AM        * Acute cholecystitis  Assessment & Plan  · Postop day 2 status post laparoscopic cholecystectomy  · Continue IV antibiotics  · Pain control as needed  · Currently on IV fluids, adjusted to half-normal saline due to hypernatremia  · Continue further management per surgical team    Acute renal failure superimposed on stage 3 chronic kidney disease ([facility] )  Assessment & Plan  · Will continue gentle hydration  · Avoid any Nephro toxic meds  · Will follow-up BMP today  · Nephrology consultation appreciated  · Haile catheter in place    Hypernatremia  Assessment & Plan  · Currently on half-normal saline  · Will follow-up BMP today  · Monitor sodium level  · Nephrology input appreciated    Neuroendocrine tumor  Assessment & Plan  · Patient currently on outpatient treatment with Oncology  · Continue outpatient follow-up  · Supportive care    Hypomagnesemia  Assessment & Plan  · Replaced, will monitor    Chronic indwelling Haile catheter  Assessment & Plan  · Continue Flomax    Type 2 diabetes 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, with long-term current use of insulin ([facility])  Assessment & Plan  · Insulin sliding scale  · Once p o  Intake improved, will initiate Lantus    VTE Pharmacologic Prophylaxis: Pharmacologic: Heparin    Patient Centered Rounds: I have performed bedside rounds with nursing staff today      Discussions with Specialists or Other Care Team Provider: yes  Education and Discussions with Family / Patient: yes    Current Length of Stay: 4 day(s)    Current Patient Status: Inpatient   Certification Statement: The patient will continue to require additional inpatient hospital stay due to Cholecystitis    Discharge Plan:  Pending hospital course    Code Status: Level 1 - Full Code    Subjective:   No overnight events noted  Patient still with intermittent abdominal pain  Denies any nausea or vomiting  Afebrile  Objective:     Vitals:   Temp (24hrs), Av 6 °F (36 4 °C), Min:97 4 °F (36 3 °C), Max:97 8 °F (36 6 °C)    Temp:  [97 4 °F (36 3 °C)-97 8 °F (36 6 °C)] 97 6 °F (36 4 °C)  HR:  [64-86] 76  Resp:  [12-26] 16  BP: (108-172)/(53-82) 156/75  SpO2:  [91 %-100 %] 98 %  Body mass index is 24 2 kg/m²  Input and Output Summary (last 24 hours): Intake/Output Summary (Last 24 hours) at 2020 0838  Last data filed at 2020 0643  Gross per 24 hour   Intake 2776 66 ml   Output 1325 ml   Net 1451 66 ml       Physical Exam:     Physical Exam   Constitutional: No distress  Frail elderly male   HENT:   Head: Normocephalic and atraumatic  Eyes: Conjunctivae and EOM are normal    Neck: Normal range of motion  Neck supple  Cardiovascular: Normal rate and regular rhythm  Pulmonary/Chest: Effort normal  No respiratory distress  Abdominal: Soft  There is tenderness  Genitourinary:   Genitourinary Comments: Haile catheter in place   Musculoskeletal:   Generalized weakness   Neurological: He is alert  No cranial nerve deficit  Skin: Skin is warm and dry  Psychiatric: He has a normal mood and affect   His behavior is normal        Additional Data:     Labs:    Results from last 7 days   Lab Units 20  0559   WBC Thousand/uL 12 50*   HEMOGLOBIN g/dL 8 5*   HEMATOCRIT % 26 0*   PLATELETS Thousands/uL 224   NEUTROS PCT % 83*   LYMPHS PCT % 7*   MONOS PCT % 9   EOS PCT % 1     Results from last 7 days   Lab Units 20  0449   POTASSIUM mmol/L 4 4   CHLORIDE mmol/L 112*   CO2 mmol/L 18*   BUN mg/dL 33*   CREATININE mg/dL 2 79*   CALCIUM mg/dL 8 2*   ALK PHOS U/L 79   ALT U/L 15   AST U/L 17     Results from last 7 days   Lab Units 20  1952   INR  1 21     Results from last 7 days   Lab Units 20  0754 20  2143 20  1610 20  1144 02/08/20  0819 02/07/20  2303 02/07/20  1928 02/07/20  1057 02/07/20  0609 02/06/20  2051 02/06/20  1635 02/06/20  1123   POC GLUCOSE mg/dl 176* 183* 243* 216* 213* 238* 218* 194* 211* 275* 328* 281*           * I Have Reviewed All Lab Data Listed Above  * Additional Pertinent Lab Tests Reviewed:  Laura 65 Admission  Reviewed    Imaging:  Imaging Reports Reviewed Today Include:  No new imaging    Recent Cultures (last 7 days):     Results from last 7 days   Lab Units 02/03/20  1427   GRAM STAIN RESULT  3+ Polys*  3+ Gram positive cocci in pairs and chains*   BODY FLUID CULTURE, STERILE  Few Colonies of - Presumptive Pantoea species*  4+ Growth of Enterococcus faecalis*  2+ Growth of Staphylococcus aureus*       Last 24 Hours Medication List:     Current Facility-Administered Medications:  acetaminophen 650 mg Oral Q6H PRN Jewel Punt, PA-C    amLODIPine 10 mg Oral Daily Jewel Punt, PA-C    b complex-vitamin C-folic acid 1 capsule Oral Daily With [employer] Rory, PA-C    cholecalciferol 1,000 Units Oral Daily Jewel Punt, PA-C    colchicine 0 6 mg Oral Daily Libra Rory, PA-C    docusate sodium 100 mg Oral BID PRN Jewel Punt, PA-C    heparin (porcine) 5,000 Units Subcutaneous [facility] Jewel Punt, PA-C    HYDROmorphone 0 5 mg Intravenous Q4H PRN Jewel Punt, PA-C    insulin lispro 1-5 Units Subcutaneous TID AC Libra Rory, PA-C    insulin lispro 1-5 Units Subcutaneous HS Jewel Punt, PA-C    magnesium sulfate 2 g Intravenous Once Betty Franco MD    ondansetron 4 mg Intravenous Q6H PRN Jewel Punt, PA-C    oxyCODONE-acetaminophen 1 tablet Oral Q4H PRN Jewel Punt, PA-C    oxyCODONE-acetaminophen 2 tablet Oral Q4H PRN Jewel Punt, PA-C    pantoprazole 40 mg Oral Early Morning Jewel Punt, PA-C    piperacillin-tazobactam 2 25 g Intravenous Q6H Willy Bose MD Last Rate: Stopped (02/09/20 0630)   saccharomyces boulardii 250 mg Oral BID Travis Marroquin PA-C    simethicone 80 mg Oral Q6H PRN Jhony Navarro PA-C    sodium bicarbonate 650 mg Oral BID after meals Jhony Navarro PA-C    sodium chloride (PF) 10 mL Intravenous Once per day on Mon Thu Libra Valadez PA-C    sodium chloride 100 mL/hr Intravenous Continuous Trip Fuentes MD Last Rate: 100 mL/hr (02/09/20 0648)   tamsulosin 0 4 mg Oral Daily With Dinner Jhony Navarro PA-C         Today, Patient Was Seen By: Trip Fuentes MD    ** Please Note: Dictation voice to text software may have been used in the creation of this document   **

## 2020-02-10 LAB
ANION GAP SERPL CALCULATED.3IONS-SCNC: 11 MMOL/L (ref 4–13)
BASOPHILS # BLD AUTO: 0 THOUSANDS/ΜL (ref 0–0.1)
BASOPHILS NFR BLD AUTO: 0 % (ref 0–2)
BUN SERPL-MCNC: 27 MG/DL (ref 7–25)
CALCIUM SERPL-MCNC: 8.4 MG/DL (ref 8.6–10.5)
CHLORIDE SERPL-SCNC: 105 MMOL/L (ref 98–107)
CO2 SERPL-SCNC: 20 MMOL/L (ref 21–31)
CREAT SERPL-MCNC: 2.93 MG/DL (ref 0.7–1.3)
EOSINOPHIL # BLD AUTO: 0.1 THOUSAND/ΜL (ref 0–0.61)
EOSINOPHIL NFR BLD AUTO: 0 % (ref 0–5)
ERYTHROCYTE [DISTWIDTH] IN BLOOD BY AUTOMATED COUNT: 15.6 % (ref 11.5–14.5)
GFR SERPL CREATININE-BSD FRML MDRD: 19 ML/MIN/1.73SQ M
GLUCOSE SERPL-MCNC: 186 MG/DL (ref 65–140)
GLUCOSE SERPL-MCNC: 190 MG/DL (ref 65–140)
GLUCOSE SERPL-MCNC: 203 MG/DL (ref 65–99)
GLUCOSE SERPL-MCNC: 238 MG/DL (ref 65–140)
GLUCOSE SERPL-MCNC: 318 MG/DL (ref 65–140)
HCT VFR BLD AUTO: 27 % (ref 42–47)
HGB BLD-MCNC: 8.6 G/DL (ref 14–18)
LYMPHOCYTES # BLD AUTO: 0.9 THOUSANDS/ΜL (ref 0.6–4.47)
LYMPHOCYTES NFR BLD AUTO: 6 % (ref 21–51)
MAGNESIUM SERPL-MCNC: 1.9 MG/DL (ref 1.9–2.7)
MCH RBC QN AUTO: 28.8 PG (ref 26–34)
MCHC RBC AUTO-ENTMCNC: 32 G/DL (ref 31–37)
MCV RBC AUTO: 90 FL (ref 81–99)
MONOCYTES # BLD AUTO: 1.4 THOUSAND/ΜL (ref 0.17–1.22)
MONOCYTES NFR BLD AUTO: 10 % (ref 2–12)
NEUTROPHILS # BLD AUTO: 12.1 THOUSANDS/ΜL (ref 1.4–6.5)
NEUTS SEG NFR BLD AUTO: 83 % (ref 42–75)
PLATELET # BLD AUTO: 265 THOUSANDS/UL (ref 149–390)
PMV BLD AUTO: 8.2 FL (ref 8.6–11.7)
POTASSIUM SERPL-SCNC: 3.5 MMOL/L (ref 3.5–5.5)
RBC # BLD AUTO: 3 MILLION/UL (ref 4.3–5.9)
SODIUM SERPL-SCNC: 136 MMOL/L (ref 134–143)
WBC # BLD AUTO: 14.5 THOUSAND/UL (ref 4.8–10.8)

## 2020-02-10 PROCEDURE — 80048 BASIC METABOLIC PNL TOTAL CA: CPT | Performed by: SPECIALIST

## 2020-02-10 PROCEDURE — 82948 REAGENT STRIP/BLOOD GLUCOSE: CPT

## 2020-02-10 PROCEDURE — 99232 SBSQ HOSP IP/OBS MODERATE 35: CPT | Performed by: NURSE PRACTITIONER

## 2020-02-10 PROCEDURE — 99024 POSTOP FOLLOW-UP VISIT: CPT | Performed by: SPECIALIST

## 2020-02-10 PROCEDURE — 99232 SBSQ HOSP IP/OBS MODERATE 35: CPT | Performed by: HOSPITALIST

## 2020-02-10 PROCEDURE — 97116 GAIT TRAINING THERAPY: CPT

## 2020-02-10 PROCEDURE — 83735 ASSAY OF MAGNESIUM: CPT | Performed by: NURSE PRACTITIONER

## 2020-02-10 PROCEDURE — 97530 THERAPEUTIC ACTIVITIES: CPT

## 2020-02-10 PROCEDURE — 85025 COMPLETE CBC W/AUTO DIFF WBC: CPT | Performed by: SPECIALIST

## 2020-02-10 RX ORDER — COLCHICINE 0.6 MG/1
0.3 TABLET ORAL DAILY
Status: DISCONTINUED | OUTPATIENT
Start: 2020-02-11 | End: 2020-02-20 | Stop reason: HOSPADM

## 2020-02-10 RX ORDER — INSULIN GLARGINE 100 [IU]/ML
15 INJECTION, SOLUTION SUBCUTANEOUS
Status: DISCONTINUED | OUTPATIENT
Start: 2020-02-10 | End: 2020-02-20 | Stop reason: HOSPADM

## 2020-02-10 RX ORDER — DOCUSATE SODIUM 100 MG/1
100 CAPSULE, LIQUID FILLED ORAL 2 TIMES DAILY
Status: DISCONTINUED | OUTPATIENT
Start: 2020-02-10 | End: 2020-02-12

## 2020-02-10 RX ADMIN — HEPARIN SODIUM 5000 UNITS: 5000 INJECTION INTRAVENOUS; SUBCUTANEOUS at 14:01

## 2020-02-10 RX ADMIN — INSULIN LISPRO 1 UNITS: 100 INJECTION, SOLUTION INTRAVENOUS; SUBCUTANEOUS at 22:12

## 2020-02-10 RX ADMIN — OXYCODONE HYDROCHLORIDE AND ACETAMINOPHEN 2 TABLET: 5; 325 TABLET ORAL at 03:45

## 2020-02-10 RX ADMIN — PIPERACILLIN SODIUM AND TAZOBACTAM SODIUM 2.25 G: 2; .25 INJECTION, POWDER, LYOPHILIZED, FOR SOLUTION INTRAVENOUS at 01:07

## 2020-02-10 RX ADMIN — SODIUM BICARBONATE 650 MG: 650 TABLET ORAL at 10:06

## 2020-02-10 RX ADMIN — Medication 1 CAPSULE: at 16:12

## 2020-02-10 RX ADMIN — INSULIN GLARGINE 15 UNITS: 100 INJECTION, SOLUTION SUBCUTANEOUS at 22:45

## 2020-02-10 RX ADMIN — SODIUM BICARBONATE 650 MG: 650 TABLET ORAL at 17:54

## 2020-02-10 RX ADMIN — SODIUM CHLORIDE, SODIUM GLUCONATE, SODIUM ACETATE, POTASSIUM CHLORIDE, MAGNESIUM CHLORIDE, SODIUM PHOSPHATE, DIBASIC, AND POTASSIUM PHOSPHATE 75 ML/HR: .53; .5; .37; .037; .03; .012; .00082 INJECTION, SOLUTION INTRAVENOUS at 03:47

## 2020-02-10 RX ADMIN — Medication 250 MG: at 17:54

## 2020-02-10 RX ADMIN — INSULIN LISPRO 3 UNITS: 100 INJECTION, SOLUTION INTRAVENOUS; SUBCUTANEOUS at 12:56

## 2020-02-10 RX ADMIN — COLCHICINE 0.6 MG: 0.6 TABLET, FILM COATED ORAL at 10:06

## 2020-02-10 RX ADMIN — AMLODIPINE BESYLATE 10 MG: 5 TABLET ORAL at 10:06

## 2020-02-10 RX ADMIN — HEPARIN SODIUM 5000 UNITS: 5000 INJECTION INTRAVENOUS; SUBCUTANEOUS at 22:12

## 2020-02-10 RX ADMIN — PIPERACILLIN SODIUM AND TAZOBACTAM SODIUM 2.25 G: 2; .25 INJECTION, POWDER, LYOPHILIZED, FOR SOLUTION INTRAVENOUS at 22:12

## 2020-02-10 RX ADMIN — PIPERACILLIN SODIUM AND TAZOBACTAM SODIUM 2.25 G: 2; .25 INJECTION, POWDER, LYOPHILIZED, FOR SOLUTION INTRAVENOUS at 06:23

## 2020-02-10 RX ADMIN — DOCUSATE SODIUM 100 MG: 100 CAPSULE, LIQUID FILLED ORAL at 22:12

## 2020-02-10 RX ADMIN — TAMSULOSIN HYDROCHLORIDE 0.4 MG: 0.4 CAPSULE ORAL at 16:12

## 2020-02-10 RX ADMIN — SODIUM CHLORIDE, SODIUM GLUCONATE, SODIUM ACETATE, POTASSIUM CHLORIDE, MAGNESIUM CHLORIDE, SODIUM PHOSPHATE, DIBASIC, AND POTASSIUM PHOSPHATE 75 ML/HR: .53; .5; .37; .037; .03; .012; .00082 INJECTION, SOLUTION INTRAVENOUS at 22:14

## 2020-02-10 RX ADMIN — INSULIN LISPRO 1 UNITS: 100 INJECTION, SOLUTION INTRAVENOUS; SUBCUTANEOUS at 10:07

## 2020-02-10 RX ADMIN — PANTOPRAZOLE SODIUM 40 MG: 40 TABLET, DELAYED RELEASE ORAL at 05:29

## 2020-02-10 RX ADMIN — PIPERACILLIN SODIUM AND TAZOBACTAM SODIUM 2.25 G: 2; .25 INJECTION, POWDER, LYOPHILIZED, FOR SOLUTION INTRAVENOUS at 13:28

## 2020-02-10 RX ADMIN — INSULIN LISPRO 2 UNITS: 100 INJECTION, SOLUTION INTRAVENOUS; SUBCUTANEOUS at 16:15

## 2020-02-10 RX ADMIN — Medication 250 MG: at 10:07

## 2020-02-10 RX ADMIN — VITAMIN D, TAB 1000IU (100/BT) 1000 UNITS: 25 TAB at 10:06

## 2020-02-10 RX ADMIN — HEPARIN SODIUM 5000 UNITS: 5000 INJECTION INTRAVENOUS; SUBCUTANEOUS at 05:29

## 2020-02-10 NOTE — PLAN OF CARE
Problem: PHYSICAL THERAPY ADULT  Goal: Performs mobility at highest level of function for planned discharge setting  See evaluation for individualized goals  Description  Treatment/Interventions: Functional transfer training, LE strengthening/ROM, Elevations, Therapeutic exercise, Endurance training, Patient/family training, Equipment eval/education, Bed mobility, Gait training, Spoke to nursing, Spoke to case management  Equipment Recommended: (continue RW use)       See flowsheet documentation for full assessment, interventions and recommendations  Outcome: Progressing  Note:   Prognosis: Fair  Problem List: Decreased strength, Decreased endurance, Impaired balance, Decreased mobility, Pain, Impaired judgement, Decreased safety awareness, Decreased skin integrity  Assessment: Pt seen for PT treatment session this date with interventions consisting of gait training w/ emphasis on improving pt's ability to ambulate level surfaces x 10 feet with mod A provided by therapist with RW and therapeutic activity consisting of training: bed mobility, supine<>sit transfers, sit<>stand transfers, static sitting tolerance at EOB for 10 minutes w/ B UE support, static standing tolerance for 5 minutes w/ B UE support, vc and tactile cues for static sitting posture faciliation, vc and tactile cues for static standing posture faciliation and stand pivot transfers towards L direction  Pt agreeable to PT treatment session upon arrival, pt found supine in bed w/ HOB elevated, A&O x 4 and R sided abdominal pain 5/10  In comparison to previous session, pt with improvements in dynamic sitting and standing balance,ambulatory distance  However,pt required increasd assistance for bed mobility and transfers  Post session: chair alarm engaged, all needs in reach and RN notified of session findings/recommendations Continue to recommend STR at time of d/c in order to maximize pt's functional independence and safety w/ mobility   Pt continues to be functioning below baseline level, and remains limited 2* factors listed above and including weakness,pain, decreased endurance, decreased activity tolerance, gait dysfunction,decline from PLOF  PT will continue to see pt while here in order to address the deficits listed above and provide interventions consistent w/ POC in effort to achieve STGs  Barriers to Discharge: Inaccessible home environment     Recommendation: Short-term skilled PT     PT - OK to Discharge: No    See flowsheet documentation for full assessment

## 2020-02-10 NOTE — PROGRESS NOTES
Progress Note - Junior Canas 1940, [de-identified] y o  male MRN: [id number]    Unit/Bed#: -01 Encounter: [id number]    Primary Care Provider: Alejo Sheikh DO   Date and time admitted to hospital: 2/5/2020 10:50 AM        * Acute cholecystitis  Assessment & Plan  · Postop day 3 status post laparoscopic cholecystectomy  · Continue IV antibiotics - will modify for renal dosing  · Pain control as needed  · Currently on IV fluids, adjusted to half-normal saline due to hypernatremia  · Continue further management per surgical team    Acute renal failure superimposed on stage 3 chronic kidney disease (HCC)  Assessment & Plan  · Creatinine slightly worse at 2 93 today, Nephrology services are following, management as per them  · Haile catheter in place  · Zosyn dosing has been changed to be renally adjusted    Type 2 diabetes 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, with long-term current use of insulin ([facility])  Assessment & Plan  · Continue Accu-Cheks AC and HS with sliding scale coverage for now  · Will initiate Lantus therapy for this evening    Essential hypertension  Assessment & Plan  · BP currently stable  · Continue Norvasc    Chronic indwelling Haile catheter  Assessment & Plan  · Continue Flomax    CKD (chronic kidney disease) stage 3, GFR 30-59 ml/min ([facility])  Assessment & Plan  · With an acute kidney injury  · See the outline above    Hypernatremia  Assessment & Plan  · Resolved    Hypomagnesemia  Assessment & Plan  · Resolved with repletion    Neuroendocrine tumor  Assessment & Plan  · Patient currently on outpatient treatment with Oncology  · Continue outpatient follow-up  · Supportive care    Pressure injury of right heel, unstageable (HCC)  Assessment & Plan  · Present on admission  · Continue local wound care  · Frequent repositioning        VTE Pharmacologic Prophylaxis: Pharmacologic: Heparin    Patient Centered Rounds: I have performed bedside rounds with nursing staff today      Discussions with Specialists or Other Care Team Provider:  Case management, nursing, Nephrology and pharmacy  Education and Discussions with Family / Patient:  Patient was brought up to par with the plan of care for today, all questions answered to his satisfaction    Current Length of Stay: 5 day(s)    Current Patient Status: Inpatient   Certification Statement: The patient will continue to require additional inpatient hospital stay due to The need for IV antibiotics, as well as continued management of his acute kidney injury on baseline CKD stage 4    Discharge Plan:  As per the primary service    Code Status: Level 1 - Full Code    Subjective:   Patient seen and examined, complains of generalized weakness and fatigue but has no complaints of pain    Objective:     Vitals:   Temp (24hrs), Av 6 °F (36 4 °C), Min:97 3 °F (36 3 °C), Max:97 7 °F (36 5 °C)    Temp:  [97 3 °F (36 3 °C)-97 7 °F (36 5 °C)] 97 7 °F (36 5 °C)  HR:  [69-88] 88  Resp:  [20-23] 20  BP: (139-169)/(63-75) 151/71  SpO2:  [95 %-96 %] 96 %  Body mass index is 24 2 kg/m²  Input and Output Summary (last 24 hours): Intake/Output Summary (Last 24 hours) at 2/10/2020 1621  Last data filed at 2/10/2020 1300  Gross per 24 hour   Intake 1640 ml   Output 2010 ml   Net -370 ml       Physical Exam:     Physical Exam   Constitutional: He is oriented to person, place, and time  He appears well-developed and well-nourished  HENT:   Head: Normocephalic and atraumatic  Nose: Nose normal    Mouth/Throat: Oropharynx is clear and moist    Eyes: Pupils are equal, round, and reactive to light  Conjunctivae and EOM are normal    Neck: Normal range of motion  Neck supple  No JVD present  No thyromegaly present  Cardiovascular: Normal rate, regular rhythm and intact distal pulses  Exam reveals no gallop and no friction rub  No murmur heard  Pulmonary/Chest: Effort normal and breath sounds normal  No respiratory distress  Abdominal: Soft   Bowel sounds are normal  He exhibits no distension and no mass  There is no tenderness  There is no guarding  Musculoskeletal: Normal range of motion  He exhibits no edema  Lymphadenopathy:     He has no cervical adenopathy  Neurological: He is alert and oriented to person, place, and time  No cranial nerve deficit  Skin: Skin is warm  No rash noted  No erythema  Psychiatric: He has a normal mood and affect  His behavior is normal    Anxious   Vitals reviewed  Additional Data:     Labs:    Results from last 7 days   Lab Units 02/10/20  0526   WBC Thousand/uL 14 50*   HEMOGLOBIN g/dL 8 6*   HEMATOCRIT % 27 0*   PLATELETS Thousands/uL 265   NEUTROS PCT % 83*   LYMPHS PCT % 6*   MONOS PCT % 10   EOS PCT % 0     Results from last 7 days   Lab Units 02/10/20  0526  02/08/20  0449   POTASSIUM mmol/L 3 5   < > 4 4   CHLORIDE mmol/L 105   < > 112*   CO2 mmol/L 20*   < > 18*   BUN mg/dL 27*   < > 33*   CREATININE mg/dL 2 93*   < > 2 79*   CALCIUM mg/dL 8 4*   < > 8 2*   ALK PHOS U/L  --   --  79   ALT U/L  --   --  15   AST U/L  --   --  17    < > = values in this interval not displayed  Results from last 7 days   Lab Units 02/05/20  1952   INR  1 21     Results from last 7 days   Lab Units 02/10/20  1050 02/10/20  0636 02/09/20  2001 02/09/20  1641 02/09/20  1144 02/09/20  0754 02/08/20  2143 02/08/20  1610 02/08/20  1144 02/08/20  0819 02/07/20  2303 02/07/20  1928   POC GLUCOSE mg/dl 318* 186* 265* 245* 267* 176* 183* 243* 216* 213* 238* 218*           * I Have Reviewed All Lab Data Listed Above  * Additional Pertinent Lab Tests Reviewed:  [place] [address] Admission  Reviewed    Imaging:  Imaging Reports Reviewed Today Include:  None    Recent Cultures (last 7 days):     Results from last 7 days   Lab Units 02/08/20  1436   URINE CULTURE  30,000-39,000 cfu/ml        Last 24 Hours Medication List:     Current Facility-Administered Medications:  acetaminophen 650 mg Oral Q6H PRN Kimberly Titus PA-C amLODIPine 10 mg Oral Daily Sheba [place], PA-C    b complex-vitamin C-folic acid 1 capsule Oral Daily With Dinner Sheba , PA-C    cholecalciferol 1,000 Units Oral Daily Sheba [place], [place]    [START ON 2/11/2020] colchicine 0 3 mg Oral Daily RIKA Marx    docusate sodium 100 mg Oral BID PRN Agustin Burks MD    heparin (porcine) 5,000 Units Subcutaneous [facility] Sheba [place], PA-C    HYDROmorphone 0 5 mg Intravenous Q4H PRN Sheba , PA-C    insulin lispro 1-5 Units Subcutaneous TID AC Sheba [place], PA-C    insulin lispro 1-5 Units Subcutaneous HS Sheba , PA-C    multi-electrolyte 75 mL/hr Intravenous Continuous Rohit Colon MD Last Rate: 75 mL/hr (02/10/20 0347)   ondansetron 4 mg Intravenous Q6H PRN Sheba [place], PA-C    oxyCODONE-acetaminophen 1 tablet Oral Q4H PRN Sheba [place], PA-C    oxyCODONE-acetaminophen 2 tablet Oral Q4H PRN Sheba , PA-C    pantoprazole 40 mg Oral Early Morning Sheba Bl[place], PA-C    piperacillin-tazobactam 2 25 g Intravenous Q8H Josué Mehta MD    saccharomyces boulardii 250 mg Oral BID Sheba , PA-FOUZIA    simethicone 80 mg Oral Q6H PRN Sheba [place], PA-C    sodium bicarbonate 650 mg Oral BID after meals Sheba , PA-C    sodium chloride (PF) 10 mL Intravenous Once per day on Mon Thu Libra Valadez, ROBBY    tamsulosin 0 4 mg Oral Daily With Jensen Harris PA-C         Today, Patient Was Seen By: Josué Mehta MD    ** Please Note: Dictation voice to text software may have been used in the creation of this document   **

## 2020-02-10 NOTE — ASSESSMENT & PLAN NOTE
· Continue Accu-Cheks AC and HS with sliding scale coverage for now  · Will initiate Lantus therapy for this evening

## 2020-02-10 NOTE — PROGRESS NOTES
NEPHROLOGY PROGRESS NOTE   Chloe Edouard [de-identified] y o  male MRN: [id number]  Unit/Bed#: -01 Encounter: [id number]    Assessment/Plan:    1  Acute Kidney Injury: Present on Admission  · Upon review of the medical record, baseline creatinine appears to be around 2 1-2 4 mg/dL, frequent incidence of AKIs  · Admission creatinine 2 07 mg/dL (peak), down to 2 6 mg/dL now again rising 2 93 mg/dL today  · Urine chemistries done 2/8 suggest intrarenal cause of SILVERIO  · Non-oliguric, indwelling urinary catheter patent and draining  · Received very small dose of omnipaque (12 mL) on 2/7/20  · Zosyn 2 25 g q6h- consider adjusting for eGFR of 19 to 2 25 g q8h  · Will decrease colchicine to 0 3 mg/daily, gout prophylaxis dose adjusted for eGFR  Consider discontinuing  · Continue isolyte 75 mL per hour  · Continue to monitor strict, IO, daily weight, BMPs, adjust meds for eGFR  · Avoid nephrotoxins, avoid hypotension and wide variation in blood pressure  · No indication for urgent or emergent renal replacement therapy  Monitor for renal recovery  2  Stage 4 Chronic Kidney Disease  · Baseline creatinine as above  · Longstanding diabetes mellitus and neuroendocrine carcinoma  · Follow's [facility] [place], llast visit 1/16/20  3  Metabolic Acidosis  · Chronic, takes sodium bicarb 650 mg BID  4  Urinary Retention with Chronic indwelling urinary catheter  ? Patient and draining appropriately  ? Continue flomax  5  Hypertension associated with CKD 4  6  Neuroendocrine Carcinoma with Liver Metastases  · Lancreotide injections 120 mg every 4 weeks, last dose 1/14/20  · Last oncology visit 1/7/20  7  Cholecystitis  · POD # 3 open cholecystectomy, gen/surg leading case  · Perc monet tube changed in IR 12/2019  8  DM 2 associated with CKD 4   ? Most recent a1c 8 1%  ? Needs good blood sugar control  9  Hypernatremia  · Resolved  10    Protein Calorie Malnutrition  · Consider supplementation    HPI/24hr events:   [de-identified] yo male presented 2/5/20 with acute cholecystitis, underwent open cholecystecomty on 2/7/20  Renal consultation for acute kidney injury  24 hour events: Transferred out of ICU  Tolerating diet  ROS  Denies chest pain, shortness of breath, dizziness, N/V/D, pain at catheter insertion site, numbness, tingling  A complete 10 point review of systems have been performed and are otherwise negative         Historical Information   Past Medical History:   Diagnosis Date    Acute pancreatitis without infection or necrosis 9/26/2019    Anemia of chronic renal failure     BPH (benign prostatic hyperplasia)     Cancer (HCC)     liver cancer    Cardiac disease     Chronic kidney disease, stage 3 (HCC)     Coronary artery disease     [facility]     DJD (degenerative joint disease)     Essential hypertension     Gait disturbance     Generalized weakness     GERD (gastroesophageal reflux disease)     Gout     History of transfusion     Hydronephrosis     Hyperlipidemia     Hypertension     Liver cancer [facility] [facility]     Pressure injury of skin     Proteinuria     Renal disorder     Retention of urine     Thrombophlebitis migrans     Type 2 diabetes mellitus (HCC)     Type 2 diabetes 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, with long-term [facility] use [facility] 1/23/2019     Past Surgical History:   Procedure Laterality Date    CORONARY ANGIOPLASTY WITH STENT PLACEMENT      IR IMAGE GUIDED BIOPSY/ASPIRATION LIVER  1/24/2019    IR TUBE PLACEMENT ROQUE  9/6/2019     Social History   Social History     Substance and Sexual Activity   Alcohol Use Never    Frequency: Never     Social History     Substance and Sexual Activity   Drug Use Never     Social History     Tobacco Use   Smoking Status Never Smoker   Smokeless Tobacco Never Used       Family History:   Family History   Problem Relation Age of Onset    Cancer Mother     Hypertension Father     Cancer Brother     Cancer Maternal Grandmother     Cancer Paternal Aunt        Medications:  Pertinent medications were reviewed    Current Facility-Administered Medications:  acetaminophen 650 mg Oral Q6H PRN Korin Yoo PA-C    amLODIPine 10 mg Oral Daily Korin Yoo PA-C    b complex-vitamin C-folic acid 1 capsule Oral Daily With Dinner Korin Yoo PA-C    cholecalciferol 1,000 Units Oral Daily Korin Yoo PA-C    colchicine 0 6 mg Oral Daily Korin Yoo PA-C    docusate sodium 100 mg Oral BID PRN Keller Dandy, MD    heparin (porcine) 5,000 Units Subcutaneous [facility] Korin Yoo PA-C    HYDROmorphone 0 5 mg Intravenous Q4H PRN Korin Yoo PA-C    insulin lispro 1-5 Units Subcutaneous TID AC Korin Yoo PA-C    insulin lispro 1-5 Units Subcutaneous HS Korin Yoo PA-C    multi-electrolyte 75 mL/hr Intravenous Continuous Vianney Sanchez MD Last Rate: 75 mL/hr (02/10/20 0347)   ondansetron 4 mg Intravenous Q6H PRN Korin Yoo PA-C    oxyCODONE-acetaminophen 1 tablet Oral Q4H PRN Korin Yoo PA-C    oxyCODONE-acetaminophen 2 tablet Oral Q4H PRN Korin Yoo PA-C    pantoprazole 40 mg Oral Early Morning Korin Yoo PA-C    piperacillin-tazobactam 2 25 g Intravenous Q6H Keller Dandy, MD Last Rate: 2 25 g (02/10/20 0881)   saccharomyces boulardii 250 mg Oral BID Korin Yoo PA-C    simethicone 80 mg Oral Q6H PRN Korin Yoo PA-C    sodium bicarbonate 650 mg Oral BID after meals Korin Yoo PA-C    sodium chloride (PF) 10 mL Intravenous Once per day on Mon Thu Libra Valadez PA-C    tamsulosin 0 4 mg Oral Daily With [employer] ROBBY Valadez          No Known Allergies      Vitals:   /70 (BP Location: Left arm)   Pulse 69   Temp (!) 97 3 °F (36 3 °C) (Temporal)   Resp 20   Ht 5' 8" (1 727 m)   Wt 72 2 kg (159 lb 2 8 oz) Comment: no SCD pump or  garcia on bed  SpO2 95%   BMI 24 20 kg/m²   Body mass index is 24 2 kg/m²    SpO2: 95 %,   SpO2 Activity: At Rest,   O2 Device: None (Room air)      Intake/Output Summary (Last 24 hours) at 2/10/2020 0906  Last data filed at 2/10/2020 0501  Gross per 24 hour   Intake 1760 ml   Output 2825 ml   Net -1065 ml     Invasive Devices     Peripheral Intravenous Line            Peripheral IV 02/05/20 Left Forearm 4 days          Drain            Urethral Catheter 18 Fr  48 days    Closed/Suction Drain Right Abdomen Bulb 15 Fr  2 days                Physical Exam  General: chronically ill appearing male conscious, cooperative, in no acute distress  Eyes: conjunctivae pink, anicteric sclerae  ENT: lips and mucous membranes moist  Neck: supple, no JVD, no masses  Chest: essentially clear breath sounds bilateral, no crackles, ronchus or wheezings  CVS: distinct S1 & S2, normal rate, regular rhythm  Abdomen: soft, non-tender, non-distended, normoactive bowel sounds, incision with dressing, BONILLA drain to right quadrant with sanguinous drainage  Extremities: no edema of both legs  Skin: no rash, pale  Neuro: awake, alert, oriented      Diagnostic Data:  Lab: I have personally reviewed pertinent lab results  ,   CBC:  Results from last 7 days   Lab Units 02/10/20  0526   WBC Thousand/uL 14 50*   HEMOGLOBIN g/dL 8 6*   HEMATOCRIT % 27 0*   PLATELETS Thousands/uL 265      CMP: Lab Results   Component Value Date    SODIUM 136 02/10/2020    K 3 5 02/10/2020     02/10/2020    CO2 20 (L) 02/10/2020    BUN 27 (H) 02/10/2020    CREATININE 2 93 (H) 02/10/2020    CALCIUM 8 4 (L) 02/10/2020    EGFR 19 02/10/2020   ,   PT/INR: No results found for: PT, INR,   Magnesium: No components found for: MAG,  Phosphorous: No results found for: PHOS    Microbiology:  @LABRCNTIP,(urinecx:7)@        Dois , CRNP    Portions of the record may have been created with voice recognition software  Occasional wrong word or "sound a like" substitutions may have occurred due to the inherent limitations of voice recognition software  Read the chart carefully and recognize, using context, where substitutions have occurred

## 2020-02-10 NOTE — ASSESSMENT & PLAN NOTE
· Postop day 3 status post laparoscopic cholecystectomy  · Continue IV antibiotics - will modify for renal dosing  · Pain control as needed  · Currently on IV fluids, adjusted to half-normal saline due to hypernatremia  · Continue further management per surgical team

## 2020-02-10 NOTE — SOCIAL WORK
PT is recommending STR  Spoke to the pt and SO about same  Pt would rather go home and continue services with martha Ochoa  Provided a list of agencies for the pt who indicated he would think about it  SO states the pt had been to [facility] in the past   Will continue to follow

## 2020-02-10 NOTE — ASSESSMENT & PLAN NOTE
· Creatinine slightly worse at 2 93 today, Nephrology services are following, management as per them  · Haile catheter in place  · Zosyn dosing has been changed to be renally adjusted

## 2020-02-10 NOTE — NURSING NOTE
Patient resting comfortably in bed through tout the day, RN and wound care nurse preformed skin assessments and no ulcer to penis or R heel seen, RN resolved ulcer to penis  Patient denies any needs and offers no complaints at this time, safety maintained

## 2020-02-10 NOTE — PLAN OF CARE
Problem: Potential for Falls  Goal: Patient will remain free of falls  Description  INTERVENTIONS:  - Assess patient frequently for physical needs  -  Identify cognitive and physical deficits and behaviors that affect risk of falls    -  [place] fall precautions as indicated by assessment   - Educate patient/family on patient safety including physical limitations  - Instruct patient to call for assistance with activity based on assessment  - Modify environment to reduce risk of injury  - Consider OT/PT consult to assist with strengthening/mobility  Outcome: Progressing     Problem: PAIN - ADULT  Goal: Verbalizes/displays adequate comfort level or baseline comfort level  Description  Interventions:  - Encourage patient to monitor pain and request assistance  - Assess pain using appropriate pain scale  - Administer analgesics based on type and severity of pain and evaluate response  - Implement non-pharmacological measures as appropriate and evaluate response  - Consider cultural and social influences on pain and pain management  - Notify physician/advanced practitioner if interventions unsuccessful or patient reports new pain  Outcome: Progressing     Problem: INFECTION - ADULT  Goal: Absence or prevention of progression during hospitalization  Description  INTERVENTIONS:  - Assess and monitor for signs and symptoms of infection  - Monitor lab/diagnostic results  - Monitor all insertion sites, i e  indwelling lines, tubes, and drains  - Monitor endotracheal if appropriate and nasal secretions for changes in amount and color  - [place] appropriate cooling/warming therapies per order  - Administer medications as ordered  - Instruct and encourage patient and family to use good hand hygiene technique  - Identify and instruct in appropriate isolation precautions for identified infection/condition  Outcome: Progressing  Goal: Absence of fever/infection during neutropenic period  Description  INTERVENTIONS:  - Monitor WBC    Outcome: Progressing     Problem: SAFETY ADULT  Goal: Patient will remain free of falls  Description  INTERVENTIONS:  - Assess patient frequently for physical needs  -  Identify cognitive and physical deficits and behaviors that affect risk of falls    -  [place] fall precautions as indicated by assessment   - Educate patient/family on patient safety including physical limitations  - Instruct patient to call for assistance with activity based on assessment  - Modify environment to reduce risk of injury  - Consider OT/PT consult to assist with strengthening/mobility  Outcome: Progressing  Goal: Maintain or return to baseline ADL function  Description  INTERVENTIONS:  -  Assess patient's ability to carry out ADLs; assess patient's baseline for ADL function and identify physical deficits which impact ability to perform ADLs (bathing, care of mouth/teeth, toileting, grooming, dressing, etc )  - Assess/evaluate cause of self-care deficits   - Assess range of motion  - Assess patient's mobility; develop plan if impaired  - Assess patient's need for assistive devices and provide as appropriate  - Encourage maximum independence but intervene and supervise when necessary  - Involve family in performance of ADLs  - Assess for home care needs following discharge   - Consider OT consult to assist with ADL evaluation and planning for discharge  - Provide patient education as appropriate  Outcome: Progressing  Goal: Maintain or return mobility status to optimal level  Description  INTERVENTIONS:  - Assess patient's baseline mobility status (ambulation, transfers, stairs, etc )    - Identify cognitive and physical deficits and behaviors that affect mobility  - Identify mobility aids required to assist with transfers and/or ambulation (gait belt, sit-to-stand, lift, walker, cane, etc )  - [place] fall precautions as indicated by assessment  - Record patient progress and toleration of activity level on Mobility SBAR; Consider wound care consult   Outcome: Progressing     Problem: DISCHARGE PLANNING - CARE MANAGEMENT  Goal: Discharge to post-acute care or home with appropriate resources  Description  INTERVENTIONS:  - Conduct assessment to determine patient/family and health care team treatment goals, and need for post-acute services based on payer coverage, community resources, and patient preferences, and barriers to discharge  - Address psychosocial, clinical, and financial barriers to discharge as identified in assessment in conjunction with the patient/family and health care team  - Arrange appropriate level of post-acute services according to patient's   needs and preference and payer coverage in collaboration with the physician and health care team  - Communicate with and update the patient/family, physician, and health care team regarding progress on the discharge plan  - Arrange appropriate transportation to post-acute venues    Need to reassess    Outcome: Progressing

## 2020-02-10 NOTE — PHYSICAL THERAPY NOTE
Physical Therapy Treatment Session Note    Patient's Name: Jerry Rosenbaum    Admitting Diagnosis  Acute acalculous cholecystitis    Problem List  Patient Active Problem List   Diagnosis    Weakness generalized    Type 2 diabetes 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, with long-term current use of insulin ([address] )    Essential hypertension    Mixed hyperlipidemia    Cardiac disease    Acute renal failure superimposed on stage 3 chronic kidney disease (HCC)    Hydroureter    Metabolic acidosis    Iron deficiency anemia secondary to inadequate dietary iron intake    Accelerated hypertension    Liver mass    Neuroendocrine tumor    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)    Pressure injury of right heel, unstageable ([address] )    Atherosclerosis of artery of extremity with ulceration (HCC)    Carcinoid syndrome (HCC)    Chronic indwelling Haile catheter    Moderate protein-calorie malnutrition (HCC)    Biliary sludge    Malignant neuroendocrine neoplasm ([address] )    Urinary retention    Cholecystostomy tube dysfunction    Acute pancreatitis without infection or necrosis    UTI due to extended-spectrum beta lactamase (ESBL) producing Escherichia coli    CKD (chronic kidney disease) stage 3, GFR 30-59 ml/min (HCC)    Hypomagnesemia    Anemia    Acute cholecystitis    Shortness of breath    Goals of care, counseling/discussion    Hypernatremia       Past Medical History  Past Medical History:   Diagnosis Date    Acute pancreatitis without infection or necrosis 9/26/2019    Anemia of chronic renal failure     BPH (benign prostatic hyperplasia)     Cancer (HCC)     liver cancer    Cardiac disease     Chronic kidney disease, stage 3 ([address] )     Coronary artery disease     Diabetes mellitus ([address] )     DJD (degenerative joint disease)     Essential hypertension     Gait disturbance     Generalized weakness     GERD (gastroesophageal reflux disease)     Gout     History of transfusion     Hydronephrosis     Hyperlipidemia     Hypertension     [facility] cancer [facility]     Pressure injury of skin     Proteinuria     Renal disorder     Retention of urine     Thrombophlebitis migrans     Type 2 diabetes mellitus (HCC)     Type 2 diabetes 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, with long-term current use of insulin ([facility] ) 1/23/2019       Past Surgical History  Past Surgical History:   Procedure Laterality Date    CORONARY ANGIOPLASTY WITH STENT PLACEMENT      IR IMAGE GUIDED BIOPSY/ASPIRATION LIVER  1/24/2019    IR TUBE PLACEMENT ROQUE  9/6/2019        02/10/20 1130   Pain Assessment   Pain Assessment 0-10   Pain Score 5   Pain Type Acute pain;Surgical pain   Pain Location Abdomen   Pain Orientation Right;Upper; Outer   Pain Descriptors Aching; Vianney Sor; Shooting   Pain Frequency Intermittent   Pain Onset Ongoing   Clinical Progression Not changed   Patient's Stated Pain Goal No pain   Hospital Pain Intervention(s) Medication (See MAR); Repositioned; Ambulation/increased activity   Diversional Activities Television   Multiple Pain Sites No   Restrictions/Precautions   Weight Bearing Precautions Per Order No   Other Precautions Contact/isolation; Chair Alarm; Bed Alarm; Fall Risk;Pain;Hard of hearing   General   Chart Reviewed Yes   Response to Previous Treatment Patient unable to report, no changes reported from family or staff   Family/Caregiver Present No   Cognition   Overall Cognitive Status [facility]   Arousal/Participation Alert   Attention Attends with cues to redirect   Orientation Level Oriented X4   Memory Decreased recall of precautions   Following Commands Follows one step commands with increased time or repetition   Comments Pt  agreeable to mobilize OOB  Subjective   Subjective pt  joked,"I want to get out of here and head to [place]"   Bed Mobility   Supine to Sit 2  Maximal assistance   Additional items Assist x 1;HOB elevated; Bedrails; Increased time required;Verbal cues;LE management   Sit to Supine   (DNT re:pt  was resting OOB in chair upon conclusion )   Transfers   Sit to Stand 3  Moderate assistance   Additional items Assist x 1;Bedrails; Increased time required;Verbal cues   Stand to Sit 3  Moderate assistance   Additional items Assist x 1;Bedrails; Increased time required;Verbal cues   Stand pivot 3  Moderate assistance   Additional items Assist x 1; Increased time required;Verbal cues   Ambulation/Elevation   Gait pattern Improper Weight shift; Forward Flexion;Decreased foot clearance; Short stride   Gait Assistance 4  Minimal assist   Additional items Assist x 1;Verbal cues; Tactile cues   Assistive Device Rolling walker   Distance 10 feet  (in room)   Stair Management Assistance Not tested   Balance   Static Sitting Good   Dynamic Sitting Fair +   Static Standing Fair -   Dynamic Standing Fair -   Ambulatory Poor +   Endurance Deficit   Endurance Deficit Yes   Activity Tolerance   Activity Tolerance Patient limited by fatigue   Nurse Made Aware yes   Assessment   Prognosis Fair   Problem List Decreased strength;Decreased endurance; Impaired balance;Decreased mobility;Pain; Impaired judgement;Decreased safety awareness;Decreased skin integrity   Assessment Pt seen for PT treatment session this date with interventions consisting of gait training w/ emphasis on improving pt's ability to ambulate level surfaces x 10 feet with mod A provided by therapist with RW and therapeutic activity consisting of training: bed mobility, supine<>sit transfers, sit<>stand transfers, static sitting tolerance at EOB for 10 minutes w/ B UE support, static standing tolerance for 5 minutes w/ B UE support, vc and tactile cues for static sitting posture faciliation, vc and tactile cues for static standing posture faciliation and stand pivot transfers towards L direction  Pt agreeable to PT treatment session upon arrival, pt found supine in bed w/ HOB elevated, A&O x 4 and R sided abdominal pain 5/10   In comparison to previous session, pt with improvements in dynamic sitting and standing balance,ambulatory distance  However,pt required increasd assistance for bed mobility and transfers  Post session: chair alarm engaged, all needs in reach and RN notified of session findings/recommendations Continue to recommend STR at time of d/c in order to maximize pt's functional independence and safety w/ mobility  Pt continues to be functioning below baseline level, and remains limited 2* factors listed above and including weakness,pain, decreased endurance, decreased activity tolerance, gait dysfunction,decline from PLOF  PT will continue to see pt while here in order to address the deficits listed above and provide interventions consistent w/ POC in effort to achieve STGs  Barriers to Discharge Inaccessible home environment   Goals   Patient Goals get out of here soon   STG Expiration Date 02/16/20   Short Term Goal #1 STGs remain appropriate   PT Treatment Day 1   Plan   Treatment/Interventions Functional transfer training;LE strengthening/ROM; Therapeutic exercise; Endurance training;Patient/family training;Equipment eval/education; Bed mobility;Gait training;Spoke to nursing;OT   Progress Slow progress, decreased activity tolerance   PT Frequency Other (Comment)  (3-5x/wk)   Recommendation   Recommendation Short-term skilled PT   Equipment Recommended Walker  (pt  has own)   PT - OK to Discharge No   Additional Comments Upon conclusion, pt  was sitting OOB in chair w/chair alarm engaged & all needs within reach         Dennie Ona, PT

## 2020-02-11 ENCOUNTER — HOSPITAL ENCOUNTER (OUTPATIENT)
Dept: INFUSION CENTER | Facility: HOSPITAL | Age: 80
Discharge: HOME | End: 2020-02-11

## 2020-02-11 LAB
ALBUMIN SERPL BCP-MCNC: 2.6 G/DL (ref 3.5–5.7)
ALP SERPL-CCNC: 92 U/L (ref 55–165)
ALT SERPL W P-5'-P-CCNC: 9 U/L (ref 7–52)
ANION GAP SERPL CALCULATED.3IONS-SCNC: 12 MMOL/L (ref 4–13)
AST SERPL W P-5'-P-CCNC: 13 U/L (ref 13–39)
BASOPHILS # BLD AUTO: 0.1 THOUSANDS/ΜL (ref 0–0.1)
BASOPHILS NFR BLD AUTO: 0 % (ref 0–2)
BILIRUB SERPL-MCNC: 0.4 MG/DL (ref 0.2–1)
BUN SERPL-MCNC: 27 MG/DL (ref 7–25)
CALCIUM SERPL-MCNC: 8.2 MG/DL (ref 8.6–10.5)
CHLORIDE SERPL-SCNC: 106 MMOL/L (ref 98–107)
CO2 SERPL-SCNC: 21 MMOL/L (ref 21–31)
CREAT SERPL-MCNC: 3.05 MG/DL (ref 0.7–1.3)
EOSINOPHIL # BLD AUTO: 0.1 THOUSAND/ΜL (ref 0–0.61)
EOSINOPHIL NFR BLD AUTO: 1 % (ref 0–5)
ERYTHROCYTE [DISTWIDTH] IN BLOOD BY AUTOMATED COUNT: 15.8 % (ref 11.5–14.5)
GFR SERPL CREATININE-BSD FRML MDRD: 18 ML/MIN/1.73SQ M
GLUCOSE SERPL-MCNC: 130 MG/DL (ref 65–140)
GLUCOSE SERPL-MCNC: 139 MG/DL (ref 65–99)
GLUCOSE SERPL-MCNC: 211 MG/DL (ref 65–140)
GLUCOSE SERPL-MCNC: 220 MG/DL (ref 65–140)
GLUCOSE SERPL-MCNC: 263 MG/DL (ref 65–140)
HCT VFR BLD AUTO: 24.3 % (ref 42–47)
HGB BLD-MCNC: 8 G/DL (ref 14–18)
LYMPHOCYTES # BLD AUTO: 1.3 THOUSANDS/ΜL (ref 0.6–4.47)
LYMPHOCYTES NFR BLD AUTO: 7 % (ref 21–51)
MAGNESIUM SERPL-MCNC: 1.9 MG/DL (ref 1.9–2.7)
MCH RBC QN AUTO: 29.6 PG (ref 26–34)
MCHC RBC AUTO-ENTMCNC: 32.9 G/DL (ref 31–37)
MCV RBC AUTO: 90 FL (ref 81–99)
MONOCYTES # BLD AUTO: 1.3 THOUSAND/ΜL (ref 0.17–1.22)
MONOCYTES NFR BLD AUTO: 7 % (ref 2–12)
NEUTROPHILS # BLD AUTO: 15.1 THOUSANDS/ΜL (ref 1.4–6.5)
NEUTS SEG NFR BLD AUTO: 85 % (ref 42–75)
PLATELET # BLD AUTO: 268 THOUSANDS/UL (ref 149–390)
PMV BLD AUTO: 8 FL (ref 8.6–11.7)
POTASSIUM SERPL-SCNC: 3.1 MMOL/L (ref 3.5–5.5)
PROT SERPL-MCNC: 5.3 G/DL (ref 6.4–8.9)
RBC # BLD AUTO: 2.7 MILLION/UL (ref 4.3–5.9)
SODIUM SERPL-SCNC: 139 MMOL/L (ref 134–143)
WBC # BLD AUTO: 17.8 THOUSAND/UL (ref 4.8–10.8)

## 2020-02-11 PROCEDURE — 83735 ASSAY OF MAGNESIUM: CPT | Performed by: NURSE PRACTITIONER

## 2020-02-11 PROCEDURE — 99233 SBSQ HOSP IP/OBS HIGH 50: CPT | Performed by: NURSE PRACTITIONER

## 2020-02-11 PROCEDURE — 82948 REAGENT STRIP/BLOOD GLUCOSE: CPT

## 2020-02-11 PROCEDURE — 87493 C DIFF AMPLIFIED PROBE: CPT | Performed by: SPECIALIST

## 2020-02-11 PROCEDURE — 80053 COMPREHEN METABOLIC PANEL: CPT | Performed by: NURSE PRACTITIONER

## 2020-02-11 PROCEDURE — 99024 POSTOP FOLLOW-UP VISIT: CPT | Performed by: SPECIALIST

## 2020-02-11 PROCEDURE — 85025 COMPLETE CBC W/AUTO DIFF WBC: CPT | Performed by: SPECIALIST

## 2020-02-11 RX ORDER — FUROSEMIDE 10 MG/ML
20 INJECTION INTRAMUSCULAR; INTRAVENOUS ONCE
Status: COMPLETED | OUTPATIENT
Start: 2020-02-11 | End: 2020-02-11

## 2020-02-11 RX ORDER — POTASSIUM CHLORIDE 20 MEQ/1
40 TABLET, EXTENDED RELEASE ORAL
Status: COMPLETED | OUTPATIENT
Start: 2020-02-11 | End: 2020-02-12

## 2020-02-11 RX ORDER — FUROSEMIDE 10 MG/ML
20 INJECTION INTRAMUSCULAR; INTRAVENOUS
Status: COMPLETED | OUTPATIENT
Start: 2020-02-11 | End: 2020-02-11

## 2020-02-11 RX ORDER — FUROSEMIDE 10 MG/ML
20 INJECTION INTRAMUSCULAR; INTRAVENOUS
Status: DISCONTINUED | OUTPATIENT
Start: 2020-02-11 | End: 2020-02-11

## 2020-02-11 RX ORDER — METRONIDAZOLE 500 MG/1
500 TABLET ORAL EVERY 8 HOURS SCHEDULED
Status: DISCONTINUED | OUTPATIENT
Start: 2020-02-11 | End: 2020-02-15

## 2020-02-11 RX ADMIN — HEPARIN SODIUM 5000 UNITS: 5000 INJECTION INTRAVENOUS; SUBCUTANEOUS at 21:24

## 2020-02-11 RX ADMIN — INSULIN LISPRO 2 UNITS: 100 INJECTION, SOLUTION INTRAVENOUS; SUBCUTANEOUS at 12:20

## 2020-02-11 RX ADMIN — SIMETHICONE CHEW TAB 80 MG 80 MG: 80 TABLET ORAL at 12:20

## 2020-02-11 RX ADMIN — FUROSEMIDE 20 MG: 10 INJECTION, SOLUTION INTRAMUSCULAR; INTRAVENOUS at 16:43

## 2020-02-11 RX ADMIN — VITAMIN D, TAB 1000IU (100/BT) 1000 UNITS: 25 TAB at 08:21

## 2020-02-11 RX ADMIN — SODIUM BICARBONATE 650 MG: 650 TABLET ORAL at 16:43

## 2020-02-11 RX ADMIN — POTASSIUM CHLORIDE 40 MEQ: 1500 TABLET, EXTENDED RELEASE ORAL at 12:20

## 2020-02-11 RX ADMIN — INSULIN GLARGINE 15 UNITS: 100 INJECTION, SOLUTION SUBCUTANEOUS at 21:24

## 2020-02-11 RX ADMIN — PIPERACILLIN SODIUM AND TAZOBACTAM SODIUM 2.25 G: 2; .25 INJECTION, POWDER, LYOPHILIZED, FOR SOLUTION INTRAVENOUS at 13:36

## 2020-02-11 RX ADMIN — METRONIDAZOLE 500 MG: 500 TABLET, FILM COATED ORAL at 14:34

## 2020-02-11 RX ADMIN — COLCHICINE 0.3 MG: 0.6 TABLET, FILM COATED ORAL at 08:22

## 2020-02-11 RX ADMIN — AMLODIPINE BESYLATE 10 MG: 5 TABLET ORAL at 08:21

## 2020-02-11 RX ADMIN — POTASSIUM CHLORIDE 40 MEQ: 1500 TABLET, EXTENDED RELEASE ORAL at 16:43

## 2020-02-11 RX ADMIN — TAMSULOSIN HYDROCHLORIDE 0.4 MG: 0.4 CAPSULE ORAL at 16:43

## 2020-02-11 RX ADMIN — HEPARIN SODIUM 5000 UNITS: 5000 INJECTION INTRAVENOUS; SUBCUTANEOUS at 05:02

## 2020-02-11 RX ADMIN — METRONIDAZOLE 500 MG: 500 TABLET, FILM COATED ORAL at 21:25

## 2020-02-11 RX ADMIN — PIPERACILLIN SODIUM AND TAZOBACTAM SODIUM 2.25 G: 2; .25 INJECTION, POWDER, LYOPHILIZED, FOR SOLUTION INTRAVENOUS at 22:30

## 2020-02-11 RX ADMIN — INSULIN LISPRO 2 UNITS: 100 INJECTION, SOLUTION INTRAVENOUS; SUBCUTANEOUS at 16:43

## 2020-02-11 RX ADMIN — PANTOPRAZOLE SODIUM 40 MG: 40 TABLET, DELAYED RELEASE ORAL at 05:02

## 2020-02-11 RX ADMIN — HEPARIN SODIUM 5000 UNITS: 5000 INJECTION INTRAVENOUS; SUBCUTANEOUS at 14:34

## 2020-02-11 RX ADMIN — FUROSEMIDE 20 MG: 10 INJECTION, SOLUTION INTRAMUSCULAR; INTRAVENOUS at 10:44

## 2020-02-11 RX ADMIN — DOCUSATE SODIUM 100 MG: 100 CAPSULE, LIQUID FILLED ORAL at 08:21

## 2020-02-11 RX ADMIN — Medication 1 CAPSULE: at 16:43

## 2020-02-11 RX ADMIN — SODIUM BICARBONATE 650 MG: 650 TABLET ORAL at 08:21

## 2020-02-11 RX ADMIN — PIPERACILLIN SODIUM AND TAZOBACTAM SODIUM 2.25 G: 2; .25 INJECTION, POWDER, LYOPHILIZED, FOR SOLUTION INTRAVENOUS at 05:02

## 2020-02-11 RX ADMIN — INSULIN LISPRO 1 UNITS: 100 INJECTION, SOLUTION INTRAVENOUS; SUBCUTANEOUS at 21:25

## 2020-02-11 NOTE — PLAN OF CARE
Problem: Potential for Falls  Goal: Patient will remain free of falls  Description  INTERVENTIONS:  - Assess patient frequently for physical needs  -  Identify cognitive and physical deficits and behaviors that affect risk of falls    -  [place] fall precautions as indicated by assessment   - Educate patient/family on patient safety including physical limitations  - Instruct patient to call for assistance with activity based on assessment  - Modify environment to reduce risk of injury  - Consider OT/PT consult to assist with strengthening/mobility  Outcome: Progressing     Problem: PAIN - ADULT  Goal: Verbalizes/displays adequate comfort level or baseline comfort level  Description  Interventions:  - Encourage patient to monitor pain and request assistance  - Assess pain using appropriate pain scale  - Administer analgesics based on type and severity of pain and evaluate response  - Implement non-pharmacological measures as appropriate and evaluate response  - Consider cultural and social influences on pain and pain management  - Notify physician/advanced practitioner if interventions unsuccessful or patient reports new pain  Outcome: Progressing     Problem: INFECTION - ADULT  Goal: Absence or prevention of progression during hospitalization  Description  INTERVENTIONS:  - Assess and monitor for signs and symptoms of infection  - Monitor lab/diagnostic results  - Monitor all insertion sites, i e  indwelling lines, tubes, and drains  - Monitor endotracheal if appropriate and nasal secretions for changes in amount and color  - [place] appropriate cooling/warming therapies per order  - Administer medications as ordered  - Instruct and encourage patient and family to use good hand hygiene technique  - Identify and instruct in appropriate isolation precautions for identified infection/condition  Outcome: Progressing  Goal: Absence of fever/infection during neutropenic period  Description  INTERVENTIONS:  - Monitor WBC    Outcome: Progressing     Problem: SAFETY ADULT  Goal: Patient will remain free of falls  Description  INTERVENTIONS:  - Assess patient frequently for physical needs  -  Identify cognitive and physical deficits and behaviors that affect risk of falls    -  [place] fall precautions as indicated by assessment   - Educate patient/family on patient safety including physical limitations  - Instruct patient to call for assistance with activity based on assessment  - Modify environment to reduce risk of injury  - Consider OT/PT consult to assist with strengthening/mobility  Outcome: Progressing  Goal: Maintain or return to baseline ADL function  Description  INTERVENTIONS:  -  Assess patient's ability to carry out ADLs; assess patient's baseline for ADL function and identify physical deficits which impact ability to perform ADLs (bathing, care of mouth/teeth, toileting, grooming, dressing, etc )  - Assess/evaluate cause of self-care deficits   - Assess range of motion  - Assess patient's mobility; develop plan if impaired  - Assess patient's need for assistive devices and provide as appropriate  - Encourage maximum independence but intervene and supervise when necessary  - Involve family in performance of ADLs  - Assess for home care needs following discharge   - Consider OT consult to assist with ADL evaluation and planning for discharge  - Provide patient education as appropriate  Outcome: Progressing  Goal: Maintain or return mobility status to optimal level  Description  INTERVENTIONS:  - Assess patient's baseline mobility status (ambulation, transfers, stairs, etc )    - Identify cognitive and physical deficits and behaviors that affect mobility  - Identify mobility aids required to assist with transfers and/or ambulation (gait belt, sit-to-stand, lift, walker, cane, etc )  - [place] fall precautions as indicated by assessment  - Record patient progress and toleration of activity level on Mobility SBAR; progress patient to next Phase/Stage  - Instruct patient to call for assistance with activity based on assessment  - Consider rehabilitation consult to assist with strengthening/weightbearing, etc   Outcome: Progressing     Problem: DISCHARGE PLANNING  Goal: Discharge to home or other facility with appropriate resources  Description  INTERVENTIONS:  - Identify barriers to discharge w/patient and caregiver  - Arrange for needed discharge resources and transportation as appropriate  - Identify discharge learning needs (meds, wound care, etc )  - Arrange for interpretive services to assist at discharge as needed  - Refer to Case Management Department for coordinating discharge planning if the patient needs post-hospital services based on physician/advanced practitioner order or complex needs related to functional status, cognitive ability, or social support system  Outcome: Progressing     Problem: Knowledge Deficit  Goal: Patient/family/caregiver demonstrates understanding of disease process, treatment plan, medications, and discharge instructions  Description  Complete learning assessment and assess knowledge base    Interventions:  - Provide teaching at level of understanding  - Provide teaching via preferred learning methods  Outcome: Progressing     Problem: Prexisting or High Potential for Compromised Skin Integrity  Goal: Skin integrity is maintained or improved  Description  INTERVENTIONS:  - Identify patients at risk for skin breakdown  - Assess and monitor skin integrity  - Assess and monitor nutrition and hydration status  - Monitor labs   - Assess for incontinence   - Turn and reposition patient  - Assist with mobility/ambulation  - Relieve pressure over bony prominences  - Avoid friction and shearing  - Provide appropriate hygiene as needed including keeping skin clean and dry  - Evaluate need for skin moisturizer/barrier cream  - Collaborate with interdisciplinary team   - Patient/family teaching  - Consider wound care consult   Outcome: Progressing     Problem: DISCHARGE PLANNING - CARE MANAGEMENT  Goal: Discharge to post-acute care or home with appropriate resources  Description  INTERVENTIONS:  - Conduct assessment to determine patient/family and health care team treatment goals, and need for post-acute services based on payer coverage, community resources, and patient preferences, and barriers to discharge  - Address psychosocial, clinical, and financial barriers to discharge as identified in assessment in conjunction with the patient/family and health care team  - Arrange appropriate level of post-acute services according to patient's   needs and preference and payer coverage in collaboration with the physician and health care team  - Communicate with and update the patient/family, physician, and health care team regarding progress on the discharge plan  - Arrange appropriate transportation to post-acute venues    Need to reassess    Outcome: Progressing

## 2020-02-11 NOTE — PROGRESS NOTES
Progress Note - General Surgery   Jerry Rosenbaum [de-identified] y o  male MRN: [id number]  Unit/Bed#: -01 Encounter: [id number]    Assessment:  [de-identified] y o  M POD 4 s/p lap converted to open cholecystectomy- perforated gangrenous  -continues to have incisional pain, slightly improving, c/d/i  -tolerating diet, decreased appetite  -+flatus, 1 BM yesterday, not ambulating, occasional IS use  -no BONILLA OP charted, currently 20 (ss)  -afebrile, VSS, WBC 17 8 (14 5), hgb 8 (8 6)    Plan:  -F/U AM Cr  -Continue Zosyn   -Encourage PO intake  -Maintain BONILLA drain until drainage lightens   -Appreciate nephrology's rec  -Encourage IS, cough, deep breathing  -PT/OT  -PRN pain meds  -OOB and ambulate  -DVT prophylaxis    Subjective: Patient doing well, incisional pain continuing to improve  Patient had 1 BM yesterday  Tolerated diet without N/V  States he still has a decreased appetite which is not typical for him  Patient has not ambulated, using IS occasionally  Denies fever, chills, CP, or calf tenderness  States he is having SOB which is his baseline  Objective:     Blood pressure 131/63, pulse 75, temperature 98 3 °F (36 8 °C), temperature source Tympanic, resp  rate 20, height 5' 8" (1 727 m), weight 72 2 kg (159 lb 2 8 oz), SpO2 96 %  ,Body mass index is 24 2 kg/m²  Intake/Output Summary (Last 24 hours) at 2/11/2020 0725  Last data filed at 2/11/2020 0500  Gross per 24 hour   Intake 680 ml   Output 1300 ml   Net -620 ml       Invasive Devices     Peripheral Intravenous Line            Peripheral IV 02/11/20 Distal;Right;Ventral (anterior) Forearm less than 1 day          Drain            Urethral Catheter 18 Fr  49 days    Closed/Suction Drain Right Abdomen Bulb 15 Fr  3 days                Physical Exam   Abdomen- soft, non-distended, tender around RUQ incision,  Incisions c/d/i  Dressing not saturated, no surrounding erythema   BS x 4  Extremities no calf tenderness or pedal edema    Scheduled Meds:  Current Facility-Administered Medications:  acetaminophen 650 mg Oral Q6H PRN Vergil Free, PA-C    amLODIPine 10 mg Oral Daily Vergil Free, PA-C    b complex-vitamin C-folic acid 1 capsule Oral Daily With Merlene Christinekeren Valadez, PA-C    cholecalciferol 1,000 Units Oral Daily Vergil Free, PA-C    colchicine 0 3 mg Oral Daily RIKA Parrish    docusate sodium 100 mg Oral BID Eri Aguila MD    heparin (porcine) 5,000 Units Subcutaneous [facility] Vergil Free, PA-C    HYDROmorphone 0 5 mg Intravenous Q4H PRN Vergil Free, PA-C    insulin glargine 15 Units Subcutaneous HS John Farley MD    insulin lispro 1-5 Units Subcutaneous TID AC Vergil Free, PA-C    insulin lispro 1-5 Units Subcutaneous HS Vergil Free, PA-C    multi-electrolyte 75 mL/hr Intravenous Continuous Fredericka Seip, MD Last Rate: 75 mL/hr (02/10/20 2214)   ondansetron 4 mg Intravenous Q6H PRN Vergil Free, PA-C    oxyCODONE-acetaminophen 1 tablet Oral Q4H PRN Vergil Free, PA-C    oxyCODONE-acetaminophen 2 tablet Oral Q4H PRN Vergil Free, PA-C    pantoprazole 40 mg Oral Early Morning Vergil Free, PA-C    piperacillin-tazobactam 2 25 g Intravenous Q8H John Farley MD Last Rate: 2 25 g (02/11/20 8652)   saccharomyces boulardii 250 mg Oral BID Vergil Free, PA-C    simethicone 80 mg Oral Q6H PRN Vergil Free, PA-C    sodium bicarbonate 650 mg Oral BID after meals Vergil Free, PA-C    sodium chloride (PF) 10 mL Intravenous Once per day on Mon Thu Libra Valadez, PA-C    tamsulosin 0 4 mg Oral Daily With Merlene [facility] Valadez, PA-C      Continuous Infusions:  multi-electrolyte 75 mL/hr Last Rate: 75 mL/hr (02/10/20 2214)     PRN Meds:   acetaminophen    HYDROmorphone    ondansetron    oxyCODONE-acetaminophen    oxyCODONE-acetaminophen    simethicone      Lab, Imaging and other studies:I have personally reviewed pertinent lab results      VTE Pharmacologic Prophylaxis: Heparin  VTE Mechanical Prophylaxis: sequential compression device      Malvin Lee PA-C  2/11/2020 7:25 AM

## 2020-02-11 NOTE — PROGRESS NOTES
Progress Note - General Surgery   Nancie Armstrong [de-identified] y o  male MRN: [id number]  Unit/Bed#: -01 Encounter: [id number]    Assessment:  Napping, alert when awoken  Poor appetite  Claims to have had a BM  WBC 12 5 -> 14 5  Creat 2 70 -> 2 93  Hgb 8 5 -> 8 6  BONILLA with serosanguinous output, 40 mL charted      Plan:  Continue Zosyn for perforated gangrenous cholecystitis  Encourage PO intake  PT / OT  Maintain BONILLA till drainage lightens up    Subjective/Objective   Chief Complaint: Incisional Pain with Dressing change    Subjective: Appears comfortable at rest    Objective:     Blood pressure 151/71, pulse 88, temperature 97 7 °F (36 5 °C), temperature source Temporal, resp  rate 20, height 5' 8" (1 727 m), weight 72 2 kg (159 lb 2 8 oz), SpO2 96 %  ,Body mass index is 24 2 kg/m²  Intake/Output Summary (Last 24 hours) at 2/10/2020 2016  Last data filed at 2/10/2020 1700  Gross per 24 hour   Intake 1880 ml   Output 2010 ml   Net -130 ml       Invasive Devices     Peripheral Intravenous Line            Peripheral IV 02/05/20 Left Forearm 5 days          Drain            Urethral Catheter 18 Fr  48 days    Closed/Suction Drain Right Abdomen Bulb 15 Fr  3 days                Physical Exam:   Abdomen soft, incision clean and dry   BONILLA with thick serosanguineous drainage     Lab, Imaging and other studies:  CBC:   Lab Results   Component Value Date    WBC 14 50 (H) 02/10/2020    HGB 8 6 (L) 02/10/2020    HCT 27 0 (L) 02/10/2020    MCV 90 02/10/2020     02/10/2020    MCH 28 8 02/10/2020    MCHC 32 0 02/10/2020    RDW 15 6 (H) 02/10/2020    MPV 8 2 (L) 02/10/2020   , CMP:   Lab Results   Component Value Date    SODIUM 136 02/10/2020    K 3 5 02/10/2020     02/10/2020    CO2 20 (L) 02/10/2020    BUN 27 (H) 02/10/2020    CREATININE 2 93 (H) 02/10/2020    CALCIUM 8 4 (L) 02/10/2020    EGFR 19 02/10/2020     VTE Pharmacologic Prophylaxis: Heparin  VTE Mechanical Prophylaxis: sequential compression device

## 2020-02-11 NOTE — QUICK NOTE
Patient's labs, vitals and test results reviewed  Case was discussed with Dr Sumanth Noland  Renal function is slightly worse, and the patient is hypokalemic which is being managed by Nephrology  There is the potential for discharge planning for tomorrow  I did not formally evaluate the patient today  At time of discharge patient should go home on all pre-admission medications at the preadmission [facility]  Hospital Medicine will sign off for now, since nephrology are managing the main medical issues in his care at this time

## 2020-02-11 NOTE — SOCIAL WORK
Discussed the POC with the SO and son  PT is recommending STR  Provided the list of surrounding facilities to the pt  Son and SO states the pt is active with [facility] and states "once he is oome he has a recliner and craftmatic bed in the bedroom"  Pt will think about STR, however would prefer at this time to go home with [facility]  A referral was made to [facility] [place]  Family would transport

## 2020-02-11 NOTE — PROGRESS NOTES
NEPHROLOGY PROGRESS NOTE   David Camp [de-identified] y o  male MRN: [id number]  Unit/Bed#: -01 Encounter: [id number]    Assessment/Plan:       1  Acute Kidney Injury: Present on Admission  ? Upon review of the medical record, baseline creatinine appears to be around 2 1-2 4 mg/dL, frequent incidence of AKIs  ? Admission creatinine 2 07 mg/dL (peak), down to 2 6 mg/dL now up to 3 05 mg/dL today  ? Urine chemistries done 2/8 suggest intrarenal cause of SILVERIO  ? Non-oliguric, indwelling urinary catheter patent and draining  ? Received very small dose of omnipaque (12 mL) on 2/7/20  ? Zosyn 2 25 g q8h and colchicine changed to renal dosing yesterday  ? Patient now examining edematous, with scrotal edema and shortness of breath  Will stop IVF and give small dose 20 mg IV lasix BID for one day  Monitor for response  ? Continue to monitor strict, IO, daily weight, BMPs, adjust meds for eGFR  ? Avoid nephrotoxins, avoid hypotension and wide variation in blood pressure  ? No indication for urgent or emergent renal replacement therapy  Monitor for renal recovery  2  Stage 4 Chronic Kidney Disease  ? Baseline creatinine as above  ? Longstanding diabetes mellitus and neuroendocrine carcinoma  ? Follow's in [employer] office, last visit 1/16/20  3  Metabolic Acidosis  ? Chronic, takes sodium bicarb 650 mg BID  4  Urinary Retention with Chronic indwelling urinary catheter  ? Patient and draining appropriately  ? Continue flomax  5  Hypertension associated with CKD 4  6  Neuroendocrine Carcinoma with Liver Metastases  ? Lancreotide injections 120 mg every 4 weeks, last dose 1/14/20  ? Last oncology visit 1/7/20  7  Cholecystitis  ? POD # 4 open cholecystectomy, gen/surg leading case  8  DM 2 associated with CKD 4   ? Most recent a1c 8 1%  ? Needs good blood sugar control  9  Hypokalemia  ? 3 1 today, will replete with 120 mEq PO with meals   10   Protein Calorie Malnutrition  ?  Start prosource today and adjust per patient's preference       HPI/24hr events:   [de-identified] yo male presented 2/5/20 with acute cholecystitis, underwent open cholecystecomty on 2/7/20  Renal consultation for acute kidney injury  24 hour events: creatinine continues to slowly worsen despite IVF  Now with arm, leg, and scrotal edema  Zosyn renally dosed  BONILLA draining serosang         ROS  Patient complains of some shortness of breath and feels his hands are swollen  Denies chest pain, dizziness, nausea, vomiting, diarrhea, dizziness, numbness, tingling, pain at catheter insertion site  A complete 10 point review of systems have been performed and are otherwise negative  Historical Information   Past Medical History:   Diagnosis Date    Acute pancreatitis without infection or necrosis 9/26/2019    Anemia of chronic renal failure     BPH (benign prostatic hyperplasia)     Cancer (HCC)     liver cancer    Cardiac disease     Chronic kidney disease, stage 3 (HCC)     Coronary artery disease     Diabetes mellitus (HCC)     DJD (degenerative joint disease)     Essential hypertension     Gait disturbance     Generalized weakness     GERD (gastroesophageal reflux disease)     Gout     History of transfusion     Hydronephrosis     Hyperlipidemia     Hypertension     [facility] [address] [facility]     Pressure injury of skin     Proteinuria     Renal disorder     Retention of urine     Thrombophlebitis migrans     Type 2 diabetes mellitus ([address] )     Type 2 diabetes 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, with long-term current use of insulin ([facility] [address] ) 1/23/2019     Past Surgical History:   Procedure Laterality Date    CHOLECYSTECTOMY LAPAROSCOPIC N/A 2/7/2020    Procedure: CHOLECYSTECTOMY LAPAROSCOPIC;  Surgeon:  Sydney Eric MD;  Location: [address] [place] [address];  Service: General    CORONARY ANGIOPLASTY WITH STENT PLACEMENT      IR IMAGE GUIDED [address] [facility] LIVER  1/24/2019    IR TUBE PLACEMENT ROQUE  9/6/2019     Social History   Social History Substance and Sexual Activity   Alcohol Use Never    Frequency: Never     Social History     Substance and Sexual Activity   Drug Use Never     Social History     Tobacco Use   Smoking Status Never Smoker   Smokeless Tobacco Never Used       Family History:   Family History   Problem Relation Age of Onset    Cancer Mother     Hypertension Father     Cancer Brother     Cancer Maternal Grandmother     Cancer Paternal Aunt        Medications:  Pertinent medications were reviewed    Current Facility-Administered Medications:  acetaminophen 650 mg Oral Q6H PRN Cathleen Richards PA-C    amLODIPine 10 mg Oral Daily Cathleen Richards PA-C    b complex-vitamin C-folic acid 1 capsule Oral Daily With [employer] ROBBY Valadez    cholecalciferol 1,000 Units Oral Daily Cathleen Rcihards PA-C    colchicine 0 3 mg Oral Daily RIKA Salas    docusate sodium 100 mg Oral BID Dustin De Anda MD    heparin (porcine) 5,000 Units Subcutaneous [facility] Cathleen Richards PA-C    HYDROmorphone 0 5 mg Intravenous Q4H PRN Cathleen Richards PA-C    insulin glargine 15 Units Subcutaneous HS Debra Solis MD    insulin lispro 1-5 Units Subcutaneous TID AC Cathleen Richards PA-C    insulin lispro 1-5 Units Subcutaneous HS Cathleen Richards PA-C    multi-electrolyte 75 mL/hr Intravenous Continuous Mikey Wyman MD Last Rate: 75 mL/hr (02/10/20 6667)   ondansetron 4 mg Intravenous Q6H PRN Cathleen Richards PA-C    oxyCODONE-acetaminophen 1 tablet Oral Q4H PRN Cathleen Richards PA-C    oxyCODONE-acetaminophen 2 tablet Oral Q4H PRN Cathleen Richards PA-C    pantoprazole 40 mg Oral Early Morning Cathleen Richards PA-C    piperacillin-tazobactam 2 25 g Intravenous Q8H Debra Solis MD Last Rate: 2 25 g (02/11/20 0112)   simethicone 80 mg Oral Q6H PRN Cathleen Richards PA-C    sodium bicarbonate 650 mg Oral BID after meals Cathleen Richards PA-C    sodium chloride (PF) 10 mL Intravenous Once per day on Mon Thu Libra Valadez PA-C    tamsulosin 0 4 mg Oral Daily With [employer] Denisa Bojorquez PA-C          No Known Allergies      Vitals:   /78 (BP Location: Left arm)   Pulse 75   Temp 98 1 °F (36 7 °C) (Temporal)   Resp 18   Ht 5' 8" (1 727 m)   Wt 72 2 kg (159 lb 2 8 oz) Comment: no SCD pump or  garcia on bed  SpO2 95%   BMI 24 20 kg/m²   Body mass index is 24 2 kg/m²  SpO2: 95 %,   SpO2 Activity: At Rest,   O2 Device: None (Room air)      Intake/Output Summary (Last 24 hours) at 2/11/2020 1014  Last data filed at 2/11/2020 0500  Gross per 24 hour   Intake 480 ml   Output 1300 ml   Net -820 ml     Invasive Devices     Peripheral Intravenous Line            Peripheral IV 02/11/20 Distal;Right;Ventral (anterior) Forearm less than 1 day          Drain            Urethral Catheter 18 Fr  49 days    Closed/Suction Drain Right Abdomen Bulb 15 Fr  3 days                Physical Exam  General: chronically ill appearing male conscious, cooperative, in no acute distress  Eyes: conjunctivae pink, anicteric sclerae  ENT: lips and mucous membranes moist  Neck: supple, no JVD, no masses  Chest: diminished breath sounds bilateral, no crackles, ronchus or wheezings  CVS: S1 & S2, normal rate, regular rhythm  Abdomen: soft, diffusely tender, non-distended, normoactive bowel sounds  Extremities: trace edema of both legs, trade edema to b/l arms  : Scrotal edema   Skin: no rash, pale   Neuro: awake, alert, oriented      Diagnostic Data:  Lab: I have personally reviewed pertinent lab results  ,   CBC:  Results from last 7 days   Lab Units 02/11/20  0456   WBC Thousand/uL 17 80*   HEMOGLOBIN g/dL 8 0*   HEMATOCRIT % 24 3*   PLATELETS Thousands/uL 268      CMP: Lab Results   Component Value Date    SODIUM 139 02/11/2020    K 3 1 (L) 02/11/2020     02/11/2020    CO2 21 02/11/2020    BUN 27 (H) 02/11/2020    CREATININE 3 05 (H) 02/11/2020    CALCIUM 8 2 (L) 02/11/2020    AST 13 02/11/2020    ALT 9 02/11/2020    ALKPHOS 92 02/11/2020    EGFR 18 02/11/2020   ,   PT/INR: No results found for: PT, INR,   Magnesium: No components found for: MAG,  Phosphorous: No results found for: PHOS    Microbiology:  @LABRCNT,(urinecx:7)@        RIKA Cat    Portions of the record may have been created with voice recognition software  Occasional wrong word or "sound a like" substitutions may have occurred due to the inherent limitations of voice recognition software  Read the chart carefully and recognize, using context, where substitutions have occurred

## 2020-02-12 LAB
ALBUMIN SERPL BCP-MCNC: 2.6 G/DL (ref 3.5–5.7)
ALP SERPL-CCNC: 106 U/L (ref 55–165)
ALT SERPL W P-5'-P-CCNC: 9 U/L (ref 7–52)
ANION GAP SERPL CALCULATED.3IONS-SCNC: 14 MMOL/L (ref 4–13)
AST SERPL W P-5'-P-CCNC: 12 U/L (ref 13–39)
BILIRUB SERPL-MCNC: 0.4 MG/DL (ref 0.2–1)
BUN SERPL-MCNC: 26 MG/DL (ref 7–25)
C DIFF TOX A+B STL QL IA: POSITIVE
C DIFF TOX GENS STL QL NAA+PROBE: POSITIVE
CALCIUM SERPL-MCNC: 8.2 MG/DL (ref 8.6–10.5)
CHLORIDE SERPL-SCNC: 107 MMOL/L (ref 98–107)
CO2 SERPL-SCNC: 20 MMOL/L (ref 21–31)
CREAT SERPL-MCNC: 3.07 MG/DL (ref 0.7–1.3)
GFR SERPL CREATININE-BSD FRML MDRD: 18 ML/MIN/1.73SQ M
GLUCOSE SERPL-MCNC: 162 MG/DL (ref 65–140)
GLUCOSE SERPL-MCNC: 169 MG/DL (ref 65–99)
GLUCOSE SERPL-MCNC: 266 MG/DL (ref 65–140)
GLUCOSE SERPL-MCNC: 271 MG/DL (ref 65–140)
GLUCOSE SERPL-MCNC: 277 MG/DL (ref 65–140)
MAGNESIUM SERPL-MCNC: 1.9 MG/DL (ref 1.9–2.7)
PHOSPHATE SERPL-MCNC: 3.4 MG/DL (ref 3–5.5)
POTASSIUM SERPL-SCNC: 3.2 MMOL/L (ref 3.5–5.5)
PROT SERPL-MCNC: 5.2 G/DL (ref 6.4–8.9)
SODIUM SERPL-SCNC: 141 MMOL/L (ref 134–143)

## 2020-02-12 PROCEDURE — 80053 COMPREHEN METABOLIC PANEL: CPT | Performed by: NURSE PRACTITIONER

## 2020-02-12 PROCEDURE — 82948 REAGENT STRIP/BLOOD GLUCOSE: CPT

## 2020-02-12 PROCEDURE — 83735 ASSAY OF MAGNESIUM: CPT | Performed by: NURSE PRACTITIONER

## 2020-02-12 PROCEDURE — 97110 THERAPEUTIC EXERCISES: CPT

## 2020-02-12 PROCEDURE — 99233 SBSQ HOSP IP/OBS HIGH 50: CPT | Performed by: INTERNAL MEDICINE

## 2020-02-12 PROCEDURE — 84100 ASSAY OF PHOSPHORUS: CPT | Performed by: NURSE PRACTITIONER

## 2020-02-12 PROCEDURE — 97530 THERAPEUTIC ACTIVITIES: CPT

## 2020-02-12 PROCEDURE — 99024 POSTOP FOLLOW-UP VISIT: CPT | Performed by: SPECIALIST

## 2020-02-12 RX ADMIN — PANTOPRAZOLE SODIUM 40 MG: 40 TABLET, DELAYED RELEASE ORAL at 06:34

## 2020-02-12 RX ADMIN — SODIUM BICARBONATE 650 MG: 650 TABLET ORAL at 17:06

## 2020-02-12 RX ADMIN — INSULIN LISPRO 3 UNITS: 100 INJECTION, SOLUTION INTRAVENOUS; SUBCUTANEOUS at 17:06

## 2020-02-12 RX ADMIN — METRONIDAZOLE 500 MG: 500 TABLET, FILM COATED ORAL at 06:34

## 2020-02-12 RX ADMIN — ACETAMINOPHEN 650 MG: 325 TABLET ORAL at 09:31

## 2020-02-12 RX ADMIN — METRONIDAZOLE 500 MG: 500 TABLET, FILM COATED ORAL at 21:37

## 2020-02-12 RX ADMIN — POTASSIUM CHLORIDE 40 MEQ: 1500 TABLET, EXTENDED RELEASE ORAL at 09:29

## 2020-02-12 RX ADMIN — TAMSULOSIN HYDROCHLORIDE 0.4 MG: 0.4 CAPSULE ORAL at 17:06

## 2020-02-12 RX ADMIN — AMLODIPINE BESYLATE 10 MG: 5 TABLET ORAL at 09:30

## 2020-02-12 RX ADMIN — INSULIN LISPRO 2 UNITS: 100 INJECTION, SOLUTION INTRAVENOUS; SUBCUTANEOUS at 21:38

## 2020-02-12 RX ADMIN — HEPARIN SODIUM 5000 UNITS: 5000 INJECTION INTRAVENOUS; SUBCUTANEOUS at 06:34

## 2020-02-12 RX ADMIN — Medication 1 CAPSULE: at 17:06

## 2020-02-12 RX ADMIN — INSULIN LISPRO 2 UNITS: 100 INJECTION, SOLUTION INTRAVENOUS; SUBCUTANEOUS at 11:40

## 2020-02-12 RX ADMIN — HEPARIN SODIUM 5000 UNITS: 5000 INJECTION INTRAVENOUS; SUBCUTANEOUS at 13:31

## 2020-02-12 RX ADMIN — SODIUM BICARBONATE 650 MG: 650 TABLET ORAL at 09:30

## 2020-02-12 RX ADMIN — PIPERACILLIN SODIUM AND TAZOBACTAM SODIUM 2.25 G: 2; .25 INJECTION, POWDER, LYOPHILIZED, FOR SOLUTION INTRAVENOUS at 05:30

## 2020-02-12 RX ADMIN — INSULIN GLARGINE 15 UNITS: 100 INJECTION, SOLUTION SUBCUTANEOUS at 21:37

## 2020-02-12 RX ADMIN — INSULIN LISPRO 1 UNITS: 100 INJECTION, SOLUTION INTRAVENOUS; SUBCUTANEOUS at 08:13

## 2020-02-12 RX ADMIN — METRONIDAZOLE 500 MG: 500 TABLET, FILM COATED ORAL at 13:30

## 2020-02-12 RX ADMIN — HEPARIN SODIUM 5000 UNITS: 5000 INJECTION INTRAVENOUS; SUBCUTANEOUS at 21:37

## 2020-02-12 RX ADMIN — COLCHICINE 0.3 MG: 0.6 TABLET, FILM COATED ORAL at 09:32

## 2020-02-12 RX ADMIN — VITAMIN D, TAB 1000IU (100/BT) 1000 UNITS: 25 TAB at 09:32

## 2020-02-12 NOTE — NURSING NOTE
CONTINUES TO HAVE FREQ, LOOSE BROWN STOOL  ABDOMEN IS SOFT AND OBESE, AND MIDLINE POST OP SURGICAL STAPLES, ARE DRY AND INTACT  BONILLA TO RT UPPER ABDOMEN, IS MAINTAINED, AND PATENT FOR SERO/SANG RETURN  SCDS ARE MAINTAINED  NO C/O ARE VOICED

## 2020-02-12 NOTE — PROGRESS NOTES
Progress Note - General Surgery   Mike Leon [de-identified] y o  male MRN: [id number]  Unit/Bed#: -01 Encounter: [id number]    Assessment:  POD 5 open cholecystectomy for perforated gangrenous cholecystitis  C  Diff returns as positive, diarrhea has improved with po Flagyl  Creatinine remains about 3  Better appetite  Drainage in BONILLA is more serous today    Plan:  D/C zosyn  Remove BONILLA  Continue hydration, po flagyl  ? placement    Subjective/Objective   Chief Complaint: Incisional Pain    Subjective: Appears improved today    Objective:     Blood pressure 148/69, pulse 73, temperature (!) 97 3 °F (36 3 °C), temperature source Temporal, resp  rate 18, height 5' 8" (1 727 m), weight 76 6 kg (168 lb 14 oz), SpO2 96 %  ,Body mass index is 25 68 kg/m²  Intake/Output Summary (Last 24 hours) at 2/12/2020 1221  Last data filed at 2/12/2020 1112  Gross per 24 hour   Intake 240 ml   Output 2597 ml   Net -2357 ml       Invasive Devices     Peripheral Intravenous Line            Peripheral IV 02/11/20 Distal;Right;Ventral (anterior) Forearm 1 day          Drain            Urethral Catheter 18 Fr   50 days                Physical Exam:   Abdomen soft  Incisions clean and dry  Serous drainage in BONILLA drain    Lab, Imaging and other studies:  CBC: No results found for: WBC, HGB, HCT, MCV, PLT, ADJUSTEDWBC, MCH, MCHC, RDW, MPV, NRBC, CMP:   Lab Results   Component Value Date    SODIUM 141 02/12/2020    K 3 2 (L) 02/12/2020     02/12/2020    CO2 20 (L) 02/12/2020    BUN 26 (H) 02/12/2020    CREATININE 3 07 (H) 02/12/2020    CALCIUM 8 2 (L) 02/12/2020    AST 12 (L) 02/12/2020    ALT 9 02/12/2020    ALKPHOS 106 02/12/2020    EGFR 18 02/12/2020     VTE Pharmacologic Prophylaxis: Heparin  VTE Mechanical Prophylaxis: sequential compression device

## 2020-02-12 NOTE — PLAN OF CARE
Problem: PHYSICAL THERAPY ADULT  Goal: Performs mobility at highest level of function for planned discharge setting  See evaluation for individualized goals  Description  Treatment/Interventions: Functional transfer training, LE strengthening/ROM, Elevations, Therapeutic exercise, Endurance training, Patient/family training, Equipment eval/education, Bed mobility, Gait training, Spoke to nursing, Spoke to case management  Equipment Recommended: (continue RW use)       See flowsheet documentation for full assessment, interventions and recommendations  Outcome: Progressing  Note:   Prognosis: Fair  Problem List: Decreased strength, Decreased endurance, Impaired balance, Decreased mobility, Impaired hearing, Pain  Assessment: Pt seen for PT treatment session this date with interventions consisting of Therapeutic exercise consisting of: AROM 20 reps B LE in sitting position and therapeutic activity consisting of training: supine<>sit transfers, sit<>stand transfers, static sitting tolerance at EOB for 15 minutes w/ B UE support and static standing tolerance for 1 minutes w/ B UE support  Pt agreeable to PT treatment session upon arrival, pt found supine in bed w/ HOB elevated, in no apparent distress  In comparison to previous session, pt with improvements in activity tolerance  Post session: pt returned BTB and all needs in reach  SCDs active  Continue to recommend STR at time of d/c in order to maximize pt's functional independence and safety w/ mobility  Pt continues to be functioning below baseline level, and remains limited 2* factors listed above and including decreased strength, endurance & safe functional mobility  PT will continue to see pt while here in order to address the deficits listed above and provide interventions consistent w/ POC in effort to achieve STGs    Barriers to Discharge: Inaccessible home environment     Recommendation: Short-term skilled PT     PT - OK to Discharge: No    See flowsheet documentation for full assessment

## 2020-02-12 NOTE — PHYSICAL THERAPY NOTE
Physical Therapy Cancellation Note    Attempted to see pt for PT treatment at 11:14 he was on bedpan; at 11:49 pt was eating lunch & at 12:18, pt was still eating lunch  Attempt again as able      Mira Bergeron, PTA

## 2020-02-12 NOTE — PLAN OF CARE
Problem: Potential for Falls  Goal: Patient will remain free of falls  Description  INTERVENTIONS:  - Assess patient frequently for physical needs  -  Identify cognitive and physical deficits and behaviors that affect risk of falls    -  [place] fall precautions as indicated by assessment   - Educate patient/family on patient safety including physical limitations  - Instruct patient to call for assistance with activity based on assessment  - Modify environment to reduce risk of injury  - Consider OT/PT consult to assist with strengthening/mobility  Outcome: Progressing     Problem: PAIN - ADULT  Goal: Verbalizes/displays adequate comfort level or baseline comfort level  Description  Interventions:  - Encourage patient to monitor pain and request assistance  - Assess pain using appropriate pain scale  - Administer analgesics based on type and severity of pain and evaluate response  - Implement non-pharmacological measures as appropriate and evaluate response  - Consider cultural and social influences on pain and pain management  - Notify physician/advanced practitioner if interventions unsuccessful or patient reports new pain  Outcome: Progressing     Problem: INFECTION - ADULT  Goal: Absence or prevention of progression during hospitalization  Description  INTERVENTIONS:  - Assess and monitor for signs and symptoms of infection  - Monitor lab/diagnostic results  - Monitor all insertion sites, i e  indwelling lines, tubes, and drains  - Monitor endotracheal if appropriate and nasal secretions for changes in amount and color  - [place] appropriate cooling/warming therapies per order  - Administer medications as ordered  - Instruct and encourage patient and family to use good hand hygiene technique  - Identify and instruct in appropriate isolation precautions for identified infection/condition  Outcome: Progressing  Goal: Absence of fever/infection during neutropenic period  Description  INTERVENTIONS:  - Monitor WBC    Outcome: Progressing     Problem: SAFETY ADULT  Goal: Patient will remain free of falls  Description  INTERVENTIONS:  - Assess patient frequently for physical needs  -  Identify cognitive and physical deficits and behaviors that affect risk of falls    -  [place] fall precautions as indicated by assessment   - Educate patient/family on patient safety including physical limitations  - Instruct patient to call for assistance with activity based on assessment  - Modify environment to reduce risk of injury  - Consider OT/PT consult to assist with strengthening/mobility  Outcome: Progressing  Goal: Maintain or return to baseline ADL function  Description  INTERVENTIONS:  -  Assess patient's ability to carry out ADLs; assess patient's baseline for ADL function and identify physical deficits which impact ability to perform ADLs (bathing, care of mouth/teeth, toileting, grooming, dressing, etc )  - Assess/evaluate cause of self-care deficits   - Assess range of motion  - Assess patient's mobility; develop plan if impaired  - Assess patient's need for assistive devices and provide as appropriate  - Encourage maximum independence but intervene and supervise when necessary  - Involve family in performance of ADLs  - Assess for home care needs following discharge   - Consider OT consult to assist with ADL evaluation and planning for discharge  - Provide patient education as appropriate  Outcome: Progressing  Goal: Maintain or return mobility status to optimal level  Description  INTERVENTIONS:  - Assess patient's baseline mobility status (ambulation, transfers, stairs, etc )    - Identify cognitive and physical deficits and behaviors that affect mobility  - Identify mobility aids required to assist with transfers and/or ambulation (gait belt, sit-to-stand, lift, walker, cane, etc )  - [place] fall precautions as indicated by assessment  - Record patient progress and toleration of activity level on Mobility SBAR; progress patient to next Phase/Stage  - Instruct patient to call for assistance with activity based on assessment  - Consider rehabilitation consult to assist with strengthening/weightbearing, etc   Outcome: Progressing     Problem: DISCHARGE PLANNING  Goal: Discharge to home or other facility with appropriate resources  Description  INTERVENTIONS:  - Identify barriers to discharge w/patient and caregiver  - Arrange for needed discharge resources and transportation as appropriate  - Identify discharge learning needs (meds, wound care, etc )  - Arrange for interpretive services to assist at discharge as needed  - Refer to Case Management Department for coordinating discharge planning if the patient needs post-hospital services based on physician/advanced practitioner order or complex needs related to functional status, cognitive ability, or social support system  Outcome: Progressing     Problem: Knowledge Deficit  Goal: Patient/family/caregiver demonstrates understanding of disease process, treatment plan, medications, and discharge instructions  Description  Complete learning assessment and assess knowledge base    Interventions:  - Provide teaching at level of understanding  - Provide teaching via preferred learning methods  Outcome: Progressing     Problem: Prexisting or High Potential for Compromised Skin Integrity  Goal: Skin integrity is maintained or improved  Description  INTERVENTIONS:  - Identify patients at risk for skin breakdown  - Assess and monitor skin integrity  - Assess and monitor nutrition and hydration status  - Monitor labs   - Assess for incontinence   - Turn and reposition patient  - Assist with mobility/ambulation  - Relieve pressure over bony prominences  - Avoid friction and shearing  - Provide appropriate hygiene as needed including keeping skin clean and dry  - Evaluate need for skin moisturizer/barrier cream  - Collaborate with interdisciplinary team   - Patient/family teaching  - Consider wound care consult   Outcome: Progressing     Problem: DISCHARGE PLANNING - CARE MANAGEMENT  Goal: Discharge to post-acute care or home with appropriate resources  Description  INTERVENTIONS:  - Conduct assessment to determine patient/family and health care team treatment goals, and need for post-acute services based on payer coverage, community resources, and patient preferences, and barriers to discharge  - Address psychosocial, clinical, and financial barriers to discharge as identified in assessment in conjunction with the patient/family and health care team  - Arrange appropriate level of post-acute services according to patient's   needs and preference and payer coverage in collaboration with the physician and health care team  - Communicate with and update the patient/family, physician, and health care team regarding progress on the discharge plan  - Arrange appropriate transportation to post-acute venues    Need to reassess    Outcome: Progressing

## 2020-02-12 NOTE — PLAN OF CARE
Problem: Potential for Falls  Goal: Patient will remain free of falls  Description  INTERVENTIONS:  - Assess patient frequently for physical needs  -  Identify cognitive and physical deficits and behaviors that affect risk of falls    -  [place] fall precautions as indicated by assessment   - Educate patient/family on patient safety including physical limitations  - Instruct patient to call for assistance with activity based on assessment  - Modify environment to reduce risk of injury  - Consider OT/PT consult to assist with strengthening/mobility  Outcome: Progressing     Problem: PAIN - ADULT  Goal: Verbalizes/displays adequate comfort level or baseline comfort level  Description  Interventions:  - Encourage patient to monitor pain and request assistance  - Assess pain using appropriate pain scale  - Administer analgesics based on type and severity of pain and evaluate response  - Implement non-pharmacological measures as appropriate and evaluate response  - Consider cultural and social influences on pain and pain management  - Notify physician/advanced practitioner if interventions unsuccessful or patient reports new pain  Outcome: Progressing     Problem: INFECTION - ADULT  Goal: Absence or prevention of progression during hospitalization  Description  INTERVENTIONS:  - Assess and monitor for signs and symptoms of infection  - Monitor lab/diagnostic results  - Monitor all insertion sites, i e  indwelling lines, tubes, and drains  - Monitor endotracheal if appropriate and nasal secretions for changes in amount and color  - [place] appropriate cooling/warming therapies per order  - Administer medications as ordered  - Instruct and encourage patient and family to use good hand hygiene technique  - Identify and instruct in appropriate isolation precautions for identified infection/condition  Outcome: Progressing  Goal: Absence of fever/infection during neutropenic period  Description  INTERVENTIONS:  - Monitor WBC    Outcome: Progressing     Problem: SAFETY ADULT  Goal: Patient will remain free of falls  Description  INTERVENTIONS:  - Assess patient frequently for physical needs  -  Identify cognitive and physical deficits and behaviors that affect risk of falls    -  [place] fall precautions as indicated by assessment   - Educate patient/family on patient safety including physical limitations  - Instruct patient to call for assistance with activity based on assessment  - Modify environment to reduce risk of injury  - Consider OT/PT consult to assist with strengthening/mobility  Outcome: Progressing  Goal: Maintain or return to baseline ADL function  Description  INTERVENTIONS:  -  Assess patient's ability to carry out ADLs; assess patient's baseline for ADL function and identify physical deficits which impact ability to perform ADLs (bathing, care of mouth/teeth, toileting, grooming, dressing, etc )  - Assess/evaluate cause of self-care deficits   - Assess range of motion  - Assess patient's mobility; develop plan if impaired  - Assess patient's need for assistive devices and provide as appropriate  - Encourage maximum independence but intervene and supervise when necessary  - Involve family in performance of ADLs  - Assess for home care needs following discharge   - Consider OT consult to assist with ADL evaluation and planning for discharge  - Provide patient education as appropriate  Outcome: Progressing  Goal: Maintain or return mobility status to optimal level  Description  INTERVENTIONS:  - Assess patient's baseline mobility status (ambulation, transfers, stairs, etc )    - Identify cognitive and physical deficits and behaviors that affect mobility  - Identify mobility aids required to assist with transfers and/or ambulation (gait belt, sit-to-stand, lift, walker, cane, etc )  - [place] fall precautions as indicated by assessment  - Record patient progress and toleration of activity level on Mobility SBAR; progress patient to next Phase/Stage  - Instruct patient to call for assistance with activity based on assessment  - Consider rehabilitation consult to assist with strengthening/weightbearing, etc   Outcome: Progressing     Problem: DISCHARGE PLANNING  Goal: Discharge to home or other facility with appropriate resources  Description  INTERVENTIONS:  - Identify barriers to discharge w/patient and caregiver  - Arrange for needed discharge resources and transportation as appropriate  - Identify discharge learning needs (meds, wound care, etc )  - Arrange for interpretive services to assist at discharge as needed  - Refer to Case Management Department for coordinating discharge planning if the patient needs post-hospital services based on physician/advanced practitioner order or complex needs related to functional status, cognitive ability, or social support system  Outcome: Progressing     Problem: Knowledge Deficit  Goal: Patient/family/caregiver demonstrates understanding of disease process, treatment plan, medications, and discharge instructions  Description  Complete learning assessment and assess knowledge base    Interventions:  - Provide teaching at level of understanding  - Provide teaching via preferred learning methods  Outcome: Progressing     Problem: Prexisting or High Potential for Compromised Skin Integrity  Goal: Skin integrity is maintained or improved  Description  INTERVENTIONS:  - Identify patients at risk for skin breakdown  - Assess and monitor skin integrity  - Assess and monitor nutrition and hydration status  - Monitor labs   - Assess for incontinence   - Turn and reposition patient  - Assist with mobility/ambulation  - Relieve pressure over bony prominences  - Avoid friction and shearing  - Provide appropriate hygiene as needed including keeping skin clean and dry  - Evaluate need for skin moisturizer/barrier cream  - Collaborate with interdisciplinary team   - Patient/family teaching  - Consider wound care consult   Outcome: Progressing     Problem: DISCHARGE PLANNING - CARE MANAGEMENT  Goal: Discharge to post-acute care or home with appropriate resources  Description  INTERVENTIONS:  - Conduct assessment to determine patient/family and health care team treatment goals, and need for post-acute services based on payer coverage, community resources, and patient preferences, and barriers to discharge  - Address psychosocial, clinical, and financial barriers to discharge as identified in assessment in conjunction with the patient/family and health care team  - Arrange appropriate level of post-acute services according to patient's   needs and preference and payer coverage in collaboration with the physician and health care team  - Communicate with and update the patient/family, physician, and health care team regarding progress on the discharge plan  - Arrange appropriate transportation to post-acute venues    Need to reassess    Outcome: Progressing

## 2020-02-12 NOTE — PHYSICAL THERAPY NOTE
02/12/20 7214   Pain Assessment   Pain Assessment 0-10   Pain Score 5   Pain Type Acute pain;Surgical pain   Pain Location Abdomen   Pain Orientation Mid   Pain Descriptors Aching; Sharp   Pain Frequency Intermittent   Pain Onset Ongoing   Clinical Progression Gradually improving   Patient's Stated Pain Goal No pain   Hospital Pain Intervention(s) Medication (See MAR); Repositioned; Ambulation/increased activity   Restrictions/Precautions   Weight Bearing Precautions Per Order No   Other Precautions Contact/isolation; Fall Risk;Pain;Hard of hearing   General   Chart Reviewed Yes   Family/Caregiver Present Yes  (SO)   Cognition   Overall Cognitive Status WFL   Arousal/Participation Alert; Cooperative   Attention Attends with cues to redirect   Orientation Level Oriented X4   Memory Decreased recall of precautions   Following Commands Follows one step commands with increased time or repetition   Comments pt agreed to PT session   Subjective   Subjective "I'll do what I can "   Bed Mobility   Supine to Sit 3  Moderate assistance   Additional items Assist x 1;HOB elevated; Bedrails; Increased time required;Verbal cues;LE management   Sit to Supine 3  Moderate assistance   Additional items Assist x 1;Bedrails; Increased time required;Verbal cues;LE management   Additional Comments pt sat at EOB x 15 mins due to c/o dizziness  He performed sitting LE ex as noted  Sitting balance with B UEs & VCs as needed   Transfers   Sit to Stand 3  Moderate assistance   Additional items Assist x 1; Increased time required;Verbal cues  (elevated bed)   Stand to Sit 3  Moderate assistance   Additional items Assist x 1; Increased time required;Verbal cues   Ambulation/Elevation   Gait pattern Improper Weight shift;Decreased foot clearance;Shuffling; Short stride  (2 feet to HOB, unable to amb due to c/o weakness/dizziness)   Gait Assistance 4  Minimal assist   Additional items Assist x 1;Verbal cues; Tactile cues   Assistive Device Rolling walker   [facility] Not tested   Balance   Static Sitting Good   Dynamic Sitting Fair +   Static Standing Fair -   Dynamic Standing Fair -   Ambulatory Poor +   Endurance Deficit   Endurance Deficit Yes   Activity Tolerance   Activity Tolerance Patient limited by fatigue   Nurse Made Aware yes   Exercises   Hip Flexion Sitting;20 reps;AROM; Bilateral   Hip Abduction Sitting;20 reps;AROM; Bilateral   Hip Adduction Sitting;20 reps;AROM; Bilateral  (pillow squeezes)   Knee AROM Long Arc Quad Sitting;20 reps;AROM; Bilateral   Ankle Pumps Sitting;20 reps;AROM; Bilateral   Assessment   Prognosis Fair   Problem List Decreased strength;Decreased endurance; Impaired balance;Decreased mobility; Impaired hearing;Pain   Assessment Pt seen for PT treatment session this date with interventions consisting of Therapeutic exercise consisting of: AROM 20 reps B LE in sitting position and therapeutic activity consisting of training: supine<>sit transfers, sit<>stand transfers, static sitting tolerance at EOB for 15 minutes w/ B UE support and static standing tolerance for 1 minutes w/ B UE support  Pt agreeable to PT treatment session upon arrival, pt found supine in bed w/ HOB elevated, in no apparent distress  In comparison to previous session, pt with improvements in activity tolerance  Post session: pt returned BTB and all needs in reach  SCDs active  Continue to recommend STR at time of d/c in order to maximize pt's functional independence and safety w/ mobility  Pt continues to be functioning below baseline level, and remains limited 2* factors listed above and including decreased strength, endurance & safe functional mobility  PT will continue to see pt while here in order to address the deficits listed above and provide interventions consistent w/ POC in effort to achieve STGs     Barriers to Discharge Inaccessible home environment   Goals   Patient Goals feel better   PT Treatment Day 2   Plan Treatment/Interventions Functional transfer training;LE strengthening/ROM; Therapeutic exercise; Endurance training;Patient/family training;Equipment eval/education; Bed mobility;Gait training;Spoke to nursing   Progress Slow progress, decreased activity tolerance   PT Frequency   (3-5 x week)   Recommendation   Recommendation Short-term skilled PT   Equipment Recommended Walker   PT - OK to Discharge Tayler Campbell, PTA

## 2020-02-12 NOTE — PROGRESS NOTES
Progress Note - Nephrology   Kalia Pascual [de-identified] y o  male MRN: [id number]  Unit/Bed#: -01 Encounter: [id number]    A/P:  1  Acute kidney injury:  Present on admission  Baseline creatinine is 2 1-2 4  Creatinine nazario from 2 79 -> 2 93 -> 3 05 -> 3 07 today  He is nonoliguric and had a response to furosemide given  Weights do not accurately reflect I/O  Will need outpatient followup  Continue to monitor I/O   2  CKD 4: as above and will be followed as an outpatient within one week of discharge  3  Non AG metabolic acidosis: bicarbonate is 20  Will need continued supplement  4  Type 2 diabetes 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 and hypertension:  Continue to monitor his sugars and cover  5  Hypertension associated with chronic kidney disease stage 4 and diabetes mellitus:  Blood pressure is stable  6  Acute cholecystitis:  Status post cholecystectomy for perforated gangrenous cholecystitis    The patient has bliss on his tray which I suggested he might want to avoid as he is having diarrhea      Follow up reason for today's visit:  Acute kidney injury    Acute cholecystitis    Patient Active Problem List   Diagnosis    Weakness generalized    Type 2 diabetes 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, with long-term current use of insulin ([address] )    Essential hypertension    Mixed hyperlipidemia    Cardiac disease    Acute renal failure superimposed on stage 3 chronic kidney disease ([address] )    Hydroureter    Metabolic acidosis    Iron deficiency anemia secondary to inadequate dietary iron intake    Accelerated hypertension    Liver mass    Neuroendocrine tumor    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 ([address] )    Pressure injury of right heel, unstageable ([address] )    Atherosclerosis of artery of extremity with ulceration ([address] )    Carcinoid syndrome ([address] )    Chronic indwelling Haile catheter    Moderate protein-calorie malnutrition ([address] )    Biliary sludge    Malignant neuroendocrine neoplasm ([facility]    Urinary retention    Cholecystostomy tube dysfunction    Acute pancreatitis without infection or necrosis    UTI due to extended-spectrum beta lactamase (ESBL) producing Escherichia coli    CKD (chronic kidney disease) stage 3, GFR 30-59 ml/min ([facility])    Hypomagnesemia    Anemia    Acute cholecystitis    Shortness of breath    Goals of care, counseling/discussion    Hypernatremia         Subjective:   Denies chest pain or shortness of breath  Denies significant abdominal pain  Has significant diarrhea denies nausea vomiting  Denies dysuria    Objective:     Vitals: Blood pressure 148/69, pulse 73, temperature (!) 97 3 °F (36 3 °C), temperature source Temporal, resp  rate 18, height 5' 8" (1 727 m), weight 76 6 kg (168 lb 14 oz), SpO2 96 %  ,Body mass index is 25 68 kg/m²  Weight (last 2 days)     Date/Time   Weight    02/12/20 0600   76 6 (168 87) pt unable to stand    Weight: pt unable to stand at 02/12/20 0600                Intake/Output Summary (Last 24 hours) at 2/12/2020 0850  Last data filed at 2/12/2020 0738  Gross per 24 hour   Intake 240 ml   Output 2122 ml   Net -1882 ml     I/O last 3 completed shifts: In: 240 [P O :240]  Out: 0083 [Urine:1700; Drains:22]    Urethral Catheter 18 Fr  (Active)   Reasons to continue Urinary Catheter  Chronic urinary catheter 2/9/2020  4:00 PM   Goal for Removal N/A- chronic garcia 2/8/2020  8:01 PM   Site Assessment Clean;Skin intact; Patent 2/9/2020  4:00 PM   Collection Container Standard drainage bag 2/9/2020  4:00 PM   Securement Method Securing device (Describe) 2/9/2020  4:00 PM   Output (mL) 1200 mL 2/12/2020  7:38 AM       Physical Exam: /69 (BP Location: Left arm)   Pulse 73   Temp (!) 97 3 °F (36 3 °C) (Temporal)   Resp 18   Ht 5' 8" (1 727 m)   Wt 76 6 kg (168 lb 14 oz) Comment: pt unable to stand  SpO2 96%   BMI 25 68 kg/m²     General Appearance:    Alert, cooperative, no distress, appears stated age   Head: Normocephalic, without obvious abnormality, atraumatic   Eyes:    Conjunctiva/corneas clear   Ears:    Normal external ears   Nose:   Nares normal, septum midline, mucosa normal, no drainage    or sinus tenderness   Throat:   Lips, mucosa, and tongue normal; teeth and gums normal   Neck:   Supple, symmetrical, trachea midline, no adenopathy;        thyroid:  No enlargement/tenderness/nodules; no carotid    bruit or JVD   Back:     Symmetric, no curvature, ROM normal, no CVA tenderness   Lungs:     Decr to auscultation bilaterally, respirations unlabored   Chest wall:    No tenderness or deformity   Heart:    Regular rate and rhythm, S1 and S2 normal, no murmur, rub   or gallop   Abdomen:     Soft, mildly-tender, bowel sounds active   Extremities:   Extremities normal, atraumatic, no cyanosis trace edema   Skin:   Skin color, texture, turgor normal, no rashes or lesions   Lymph nodes:   Cervical normal   Neurologic:   CNII-XII intact            Lab, Imaging and other studies: I have personally reviewed pertinent labs  CBC: No results found for: WBC, HGB, HCT, MCV, PLT, ADJUSTEDWBC, MCH, MCHC, RDW, MPV, NRBC  CMP:   Lab Results   Component Value Date    K 3 2 (L) 02/12/2020     02/12/2020    CO2 20 (L) 02/12/2020    BUN 26 (H) 02/12/2020    CREATININE 3 07 (H) 02/12/2020    CALCIUM 8 2 (L) 02/12/2020    AST 12 (L) 02/12/2020    ALT 9 02/12/2020    ALKPHOS 106 02/12/2020    EGFR 18 02/12/2020         Results from last 7 days   Lab Units 02/12/20  0631 02/11/20  0444 02/10/20  0526  02/08/20  0449   POTASSIUM mmol/L 3 2* 3 1* 3 5   < > 4 4   CHLORIDE mmol/L 107 106 105   < > 112*   CO2 mmol/L 20* 21 20*   < > 18*   BUN mg/dL 26* 27* 27*   < > 33*   CREATININE mg/dL 3 07* 3 05* 2 93*   < > 2 79*   CALCIUM mg/dL 8 2* 8 2* 8 4*   < > 8 2*   ALK PHOS U/L 106 92  --   --  79   ALT U/L 9 9  --   --  15   AST U/L 12* 13  --   --  17    < > = values in this interval not displayed           Phosphorus:   Lab Results Component Value Date    PHOS 3 4 02/12/2020     Magnesium:   Lab Results   Component Value Date    MG 1 9 02/12/2020     Urinalysis: No results found for: Nubia Overcast, SPECGRAV, PHUR, LEUKOCYTESUR, NITRITE, PROTEINUA, GLUCOSEU, KETONESU, BILIRUBINUR, BLOODU  Ionized Calcium: No results found for: CAION  Coagulation: No results found for: PT, INR, APTT  Troponin: No results found for: TROPONINI  ABG: No results found for: PHART, ILZ9PVM, PO2ART, JTQ0EXL, H8LMVNNX, BEART, SOURCE  Radiology review:     IMAGING  No results found      Current Facility-Administered Medications   Medication Dose Route Frequency    acetaminophen (TYLENOL) tablet 650 mg  650 mg Oral Q6H PRN    amLODIPine (NORVASC) tablet 10 mg  10 mg Oral Daily    b complex-vitamin C-folic acid (NEPHROCAPS) capsule 1 capsule  1 capsule Oral Daily With Dinner    cholecalciferol (VITAMIN D3) tablet 1,000 Units  1,000 Units Oral Daily    colchicine (COLCRYS) tablet 0 3 mg  0 3 mg Oral Daily    docusate sodium (COLACE) capsule 100 mg  100 mg Oral BID    heparin (porcine) subcutaneous injection 5,000 Units  5,000 Units Subcutaneous Q8H [facility]    HYDROmorphone (DILAUDID) injection 0 5 mg  0 5 mg Intravenous Q4H PRN    insulin glargine (LANTUS) subcutaneous injection 15 Units 0 15 mL  15 Units Subcutaneous HS    insulin lispro (HumaLOG) 100 units/mL subcutaneous injection 1-5 Units  1-5 Units Subcutaneous TID AC    insulin lispro (HumaLOG) 100 units/mL subcutaneous injection 1-5 Units  1-5 Units Subcutaneous HS    metroNIDAZOLE (FLAGYL) tablet 500 mg  500 mg Oral Q8H [facility]    ondansetron (ZOFRAN) injection 4 mg  4 mg Intravenous Q6H PRN    oxyCODONE-acetaminophen (PERCOCET) 5-325 mg per tablet 1 tablet  1 tablet Oral Q4H PRN    oxyCODONE-acetaminophen (PERCOCET) 5-325 mg per tablet 2 tablet  2 tablet Oral Q4H PRN    pantoprazole (PROTONIX) EC tablet 40 mg  40 mg Oral Early Morning    piperacillin-tazobactam (ZOSYN) 2 25 g in sodium chloride 0 9 % 50 mL IVPB  2 25 g Intravenous Q8H    potassium chloride (K-DUR,KLOR-CON) CR tablet 40 mEq  40 mEq Oral TID With Meals    simethicone (MYLICON) chewable tablet 80 mg  80 mg Oral Q6H PRN    sodium bicarbonate tablet 650 mg  650 mg Oral BID after meals    sodium chloride (PF) 0 9 % injection 10 mL  10 mL Intravenous Once per day on Mon Thu    tamsulosin (FLOMAX) capsule 0 4 mg  0 4 mg Oral Daily With Dinner     Medications Discontinued During This Encounter   Medication Reason    morphine injection 2 mg     sodium chloride (PF) 0 9 % injection 10 mL     bupivacaine-epinephrine (PF) (MARCAINE/EPINEPHRINE PF) 0 25 %-1:080860 injection Patient Discharge    iohexol (OMNIPAQUE) 300 mg/mL injection Patient Discharge    sodium chloride 0 9 % irrigation solution Patient Discharge    sodium chloride 0 9 % irrigation solution Patient Discharge    morphine injection 2 mg     HYDROmorphone (DILAUDID) injection 0 4 mg Patient Transfer    sodium chloride 0 9 % infusion     piperacillin-tazobactam (ZOSYN) 3 375 g in sodium chloride 0 9 % 100 mL IVPB     sodium chloride infusion 0 45 %     colchicine (COLCRYS) tablet 0 6 mg     piperacillin-tazobactam (ZOSYN) 2 25 g in sodium chloride 0 9 % 50 mL IVPB     docusate sodium (COLACE) capsule 100 mg     multi-electrolyte (PLASMALYTE-A/ISOLYTE-S PH 7 4) IV solution     furosemide (LASIX) injection 20 mg        Manny Cortes MD      This progress note was produced in part using a dictation device which may document imprecise wording from author's original intent

## 2020-02-13 LAB
ABO GROUP BLD: NORMAL
ANION GAP SERPL CALCULATED.3IONS-SCNC: 9 MMOL/L (ref 4–13)
BASOPHILS # BLD AUTO: 0.1 THOUSANDS/ΜL (ref 0–0.1)
BASOPHILS NFR BLD AUTO: 1 % (ref 0–2)
BLD GP AB SCN SERPL QL: NEGATIVE
BUN SERPL-MCNC: 22 MG/DL (ref 7–25)
CALCIUM SERPL-MCNC: 8 MG/DL (ref 8.6–10.5)
CHLORIDE SERPL-SCNC: 107 MMOL/L (ref 98–107)
CO2 SERPL-SCNC: 24 MMOL/L (ref 21–31)
CREAT SERPL-MCNC: 2.67 MG/DL (ref 0.7–1.3)
EOSINOPHIL # BLD AUTO: 0.2 THOUSAND/ΜL (ref 0–0.61)
EOSINOPHIL NFR BLD AUTO: 1 % (ref 0–5)
ERYTHROCYTE [DISTWIDTH] IN BLOOD BY AUTOMATED COUNT: 15.8 % (ref 11.5–14.5)
GFR SERPL CREATININE-BSD FRML MDRD: 22 ML/MIN/1.73SQ M
GLUCOSE SERPL-MCNC: 116 MG/DL (ref 65–99)
GLUCOSE SERPL-MCNC: 128 MG/DL (ref 65–140)
GLUCOSE SERPL-MCNC: 199 MG/DL (ref 65–140)
GLUCOSE SERPL-MCNC: 237 MG/DL (ref 65–140)
GLUCOSE SERPL-MCNC: 241 MG/DL (ref 65–140)
HCT VFR BLD AUTO: 27.6 % (ref 42–47)
HGB BLD-MCNC: 9 G/DL (ref 14–18)
LYMPHOCYTES # BLD AUTO: 1 THOUSANDS/ΜL (ref 0.6–4.47)
LYMPHOCYTES NFR BLD AUTO: 8 % (ref 21–51)
MCH RBC QN AUTO: 29.3 PG (ref 26–34)
MCHC RBC AUTO-ENTMCNC: 32.4 G/DL (ref 31–37)
MCV RBC AUTO: 90 FL (ref 81–99)
MONOCYTES # BLD AUTO: 0.6 THOUSAND/ΜL (ref 0.17–1.22)
MONOCYTES NFR BLD AUTO: 5 % (ref 2–12)
NEUTROPHILS # BLD AUTO: 10.3 THOUSANDS/ΜL (ref 1.4–6.5)
NEUTS SEG NFR BLD AUTO: 85 % (ref 42–75)
PLATELET # BLD AUTO: 281 THOUSANDS/UL (ref 149–390)
PMV BLD AUTO: 7.1 FL (ref 8.6–11.7)
POTASSIUM SERPL-SCNC: 3.2 MMOL/L (ref 3.5–5.5)
RBC # BLD AUTO: 3.06 MILLION/UL (ref 4.3–5.9)
RH BLD: POSITIVE
SODIUM SERPL-SCNC: 140 MMOL/L (ref 134–143)
SPECIMEN EXPIRATION DATE: NORMAL
WBC # BLD AUTO: 12.2 THOUSAND/UL (ref 4.8–10.8)

## 2020-02-13 PROCEDURE — 99233 SBSQ HOSP IP/OBS HIGH 50: CPT | Performed by: NURSE PRACTITIONER

## 2020-02-13 PROCEDURE — 97530 THERAPEUTIC ACTIVITIES: CPT

## 2020-02-13 PROCEDURE — 86920 COMPATIBILITY TEST SPIN: CPT

## 2020-02-13 PROCEDURE — 99024 POSTOP FOLLOW-UP VISIT: CPT | Performed by: SPECIALIST

## 2020-02-13 PROCEDURE — P9016 RBC LEUKOCYTES REDUCED: HCPCS

## 2020-02-13 PROCEDURE — 85025 COMPLETE CBC W/AUTO DIFF WBC: CPT | Performed by: SPECIALIST

## 2020-02-13 PROCEDURE — 86850 RBC ANTIBODY SCREEN: CPT | Performed by: SPECIALIST

## 2020-02-13 PROCEDURE — 82948 REAGENT STRIP/BLOOD GLUCOSE: CPT

## 2020-02-13 PROCEDURE — 80048 BASIC METABOLIC PNL TOTAL CA: CPT | Performed by: SPECIALIST

## 2020-02-13 PROCEDURE — 86900 BLOOD TYPING SEROLOGIC ABO: CPT | Performed by: SPECIALIST

## 2020-02-13 PROCEDURE — 86901 BLOOD TYPING SEROLOGIC RH(D): CPT | Performed by: SPECIALIST

## 2020-02-13 PROCEDURE — 30233N1 TRANSFUSION OF NONAUTOLOGOUS RED BLOOD CELLS INTO PERIPHERAL VEIN, PERCUTANEOUS APPROACH: ICD-10-PCS | Performed by: SPECIALIST

## 2020-02-13 RX ORDER — POTASSIUM CHLORIDE 20 MEQ/1
20 TABLET, EXTENDED RELEASE ORAL 4 TIMES DAILY
Status: COMPLETED | OUTPATIENT
Start: 2020-02-13 | End: 2020-02-13

## 2020-02-13 RX ADMIN — PANTOPRAZOLE SODIUM 40 MG: 40 TABLET, DELAYED RELEASE ORAL at 05:43

## 2020-02-13 RX ADMIN — INSULIN LISPRO 1 UNITS: 100 INJECTION, SOLUTION INTRAVENOUS; SUBCUTANEOUS at 21:34

## 2020-02-13 RX ADMIN — HEPARIN SODIUM 5000 UNITS: 5000 INJECTION INTRAVENOUS; SUBCUTANEOUS at 21:06

## 2020-02-13 RX ADMIN — POTASSIUM CHLORIDE 20 MEQ: 1500 TABLET, EXTENDED RELEASE ORAL at 09:55

## 2020-02-13 RX ADMIN — METRONIDAZOLE 500 MG: 500 TABLET, FILM COATED ORAL at 21:07

## 2020-02-13 RX ADMIN — TAMSULOSIN HYDROCHLORIDE 0.4 MG: 0.4 CAPSULE ORAL at 17:27

## 2020-02-13 RX ADMIN — AMLODIPINE BESYLATE 10 MG: 5 TABLET ORAL at 09:56

## 2020-02-13 RX ADMIN — METRONIDAZOLE 500 MG: 500 TABLET, FILM COATED ORAL at 13:49

## 2020-02-13 RX ADMIN — SODIUM BICARBONATE 650 MG: 650 TABLET ORAL at 17:27

## 2020-02-13 RX ADMIN — HEPARIN SODIUM 5000 UNITS: 5000 INJECTION INTRAVENOUS; SUBCUTANEOUS at 13:49

## 2020-02-13 RX ADMIN — VITAMIN D, TAB 1000IU (100/BT) 1000 UNITS: 25 TAB at 09:56

## 2020-02-13 RX ADMIN — INSULIN LISPRO 2 UNITS: 100 INJECTION, SOLUTION INTRAVENOUS; SUBCUTANEOUS at 17:28

## 2020-02-13 RX ADMIN — COLCHICINE 0.3 MG: 0.6 TABLET, FILM COATED ORAL at 09:56

## 2020-02-13 RX ADMIN — INSULIN GLARGINE 15 UNITS: 100 INJECTION, SOLUTION SUBCUTANEOUS at 21:07

## 2020-02-13 RX ADMIN — INSULIN LISPRO 2 UNITS: 100 INJECTION, SOLUTION INTRAVENOUS; SUBCUTANEOUS at 12:10

## 2020-02-13 RX ADMIN — POTASSIUM CHLORIDE 20 MEQ: 1500 TABLET, EXTENDED RELEASE ORAL at 21:07

## 2020-02-13 RX ADMIN — SODIUM BICARBONATE 650 MG: 650 TABLET ORAL at 09:55

## 2020-02-13 RX ADMIN — Medication 1 CAPSULE: at 17:27

## 2020-02-13 RX ADMIN — HEPARIN SODIUM 5000 UNITS: 5000 INJECTION INTRAVENOUS; SUBCUTANEOUS at 05:43

## 2020-02-13 RX ADMIN — POTASSIUM CHLORIDE 20 MEQ: 1500 TABLET, EXTENDED RELEASE ORAL at 12:10

## 2020-02-13 RX ADMIN — METRONIDAZOLE 500 MG: 500 TABLET, FILM COATED ORAL at 05:43

## 2020-02-13 RX ADMIN — POTASSIUM CHLORIDE 20 MEQ: 1500 TABLET, EXTENDED RELEASE ORAL at 17:28

## 2020-02-13 NOTE — NURSING NOTE
Lab called to inform Dr Carrie Santana of SL blood transfusion parameters  Pt's Hgb is 9 0 today  Message left on Dr Lani Plata cell phone re: pt's lab work &  message  Awaiting return call

## 2020-02-13 NOTE — PROGRESS NOTES
Progress Note - General Surgery   Manav Fishman [de-identified] y o  male MRN: [id number]  Unit/Bed#: -01 Encounter: [id number]    Assessment:  POD 6 from open cholecystectomy for perforated, gangrenous cholecystitis  Patient resting quietly, remarks that he was unable to participate with Physical Therapy today because he became lightheaded and unsteady  Has a better appetite today  Stool is soft, bordering on diarrhea, with 2 BM's today so far  No discernable blood in bowel movements  Incision clean and dry, scant drainage from BONILLA site  Plan:  Transfuse one unit of PRBC  Continue Flagyl  Encourage PO intake   Patient plans to go home with VNA support upon discharge  Subjective/Objective   Chief Complaint: Lightheaded    Subjective: Appears comfortable    Objective:     Blood pressure 142/62, pulse 79, temperature (!) 97 1 °F (36 2 °C), temperature source Temporal, resp  rate 18, height 5' 8" (1 727 m), weight 74 6 kg (164 lb 7 4 oz), SpO2 98 %  ,Body mass index is 25 01 kg/m²        Intake/Output Summary (Last 24 hours) at 2/13/2020 1433  Last data filed at 2/13/2020 1400  Gross per 24 hour   Intake 730 ml   Output 2500 ml   Net -1770 ml       Invasive Devices     Peripheral Intravenous Line            Peripheral IV 02/11/20 Distal;Right;Ventral (anterior) Forearm 2 days          Drain            Urethral Catheter 18 Fr  51 days                Physical Exam:   Abdomen soft, incision clean and dry  Scant serous drainage from BONILLA drain site    Lab, Imaging and other studies:  CBC:   Lab Results   Component Value Date    WBC 12 20 (H) 02/13/2020    HGB 9 0 (L) 02/13/2020    HCT 27 6 (L) 02/13/2020    MCV 90 02/13/2020     02/13/2020    MCH 29 3 02/13/2020    MCHC 32 4 02/13/2020    RDW 15 8 (H) 02/13/2020    MPV 7 1 (L) 02/13/2020   , CMP:   Lab Results   Component Value Date    SODIUM 140 02/13/2020    K 3 2 (L) 02/13/2020     02/13/2020    CO2 24 02/13/2020    BUN 22 02/13/2020    CREATININE 2 67 (H) 02/13/2020    CALCIUM 8 0 (L) 02/13/2020    EGFR 22 02/13/2020     VTE Pharmacologic Prophylaxis: Heparin  VTE Mechanical Prophylaxis: sequential compression device

## 2020-02-13 NOTE — PLAN OF CARE
Problem: Potential for Falls  Goal: Patient will remain free of falls  Description  INTERVENTIONS:  - Assess patient frequently for physical needs  -  Identify cognitive and physical deficits and behaviors that affect risk of falls    -  [place] fall precautions as indicated by assessment   - Educate patient/family on patient safety including physical limitations  - Instruct patient to call for assistance with activity based on assessment  - Modify environment to reduce risk of injury  - Consider OT/PT consult to assist with strengthening/mobility  Outcome: Progressing     Problem: PAIN - ADULT  Goal: Verbalizes/displays adequate comfort level or baseline comfort level  Description  Interventions:  - Encourage patient to monitor pain and request assistance  - Assess pain using appropriate pain scale  - Administer analgesics based on type and severity of pain and evaluate response  - Implement non-pharmacological measures as appropriate and evaluate response  - Consider cultural and social influences on pain and pain management  - Notify physician/advanced practitioner if interventions unsuccessful or patient reports new pain  Outcome: Progressing     Problem: INFECTION - ADULT  Goal: Absence or prevention of progression during hospitalization  Description  INTERVENTIONS:  - Assess and monitor for signs and symptoms of infection  - Monitor lab/diagnostic results  - Monitor all insertion sites, i e  indwelling lines, tubes, and drains  - Monitor endotracheal if appropriate and nasal secretions for changes in amount and color  - [place] appropriate cooling/warming therapies per order  - Administer medications as ordered  - Instruct and encourage patient and family to use good hand hygiene technique  - Identify and instruct in appropriate isolation precautions for identified infection/condition  Outcome: Progressing  Goal: Absence of fever/infection during neutropenic period  Description  INTERVENTIONS:  - Monitor WBC    Outcome: Progressing     Problem: SAFETY ADULT  Goal: Patient will remain free of falls  Description  INTERVENTIONS:  - Assess patient frequently for physical needs  -  Identify cognitive and physical deficits and behaviors that affect risk of falls    -  [place] fall precautions as indicated by assessment   - Educate patient/family on patient safety including physical limitations  - Instruct patient to call for assistance with activity based on assessment  - Modify environment to reduce risk of injury  - Consider OT/PT consult to assist with strengthening/mobility  Outcome: Progressing  Goal: Maintain or return to baseline ADL function  Description  INTERVENTIONS:  -  Assess patient's ability to carry out ADLs; assess patient's baseline for ADL function and identify physical deficits which impact ability to perform ADLs (bathing, care of mouth/teeth, toileting, grooming, dressing, etc )  - Assess/evaluate cause of self-care deficits   - Assess range of motion  - Assess patient's mobility; develop plan if impaired  - Assess patient's need for assistive devices and provide as appropriate  - Encourage maximum independence but intervene and supervise when necessary  - Involve family in performance of ADLs  - Assess for home care needs following discharge   - Consider OT consult to assist with ADL evaluation and planning for discharge  - Provide patient education as appropriate  Outcome: Progressing  Goal: Maintain or return mobility status to optimal level  Description  INTERVENTIONS:  - Assess patient's baseline mobility status (ambulation, transfers, stairs, etc )    - Identify cognitive and physical deficits and behaviors that affect mobility  - Identify mobility aids required to assist with transfers and/or ambulation (gait belt, sit-to-stand, lift, walker, cane, etc )  - [place] fall precautions as indicated by assessment  - Record patient progress and toleration of activity level on Mobility SBAR; progress patient to next Phase/Stage  - Instruct patient to call for assistance with activity based on assessment  - Consider rehabilitation consult to assist with strengthening/weightbearing, etc   Outcome: Progressing     Problem: DISCHARGE PLANNING  Goal: Discharge to home or other facility with appropriate resources  Description  INTERVENTIONS:  - Identify barriers to discharge w/patient and caregiver  - Arrange for needed discharge resources and transportation as appropriate  - Identify discharge learning needs (meds, wound care, etc )  - Arrange for interpretive services to assist at discharge as needed  - Refer to Case Management Department for coordinating discharge planning if the patient needs post-hospital services based on physician/advanced practitioner order or complex needs related to functional status, cognitive ability, or social support system  Outcome: Progressing     Problem: Knowledge Deficit  Goal: Patient/family/caregiver demonstrates understanding of disease process, treatment plan, medications, and discharge instructions  Description  Complete learning assessment and assess knowledge base    Interventions:  - Provide teaching at level of understanding  - Provide teaching via preferred learning methods  Outcome: Progressing     Problem: Prexisting or High Potential for Compromised Skin Integrity  Goal: Skin integrity is maintained or improved  Description  INTERVENTIONS:  - Identify patients at risk for skin breakdown  - Assess and monitor skin integrity  - Assess and monitor nutrition and hydration status  - Monitor labs   - Assess for incontinence   - Turn and reposition patient  - Assist with mobility/ambulation  - Relieve pressure over bony prominences  - Avoid friction and shearing  - Provide appropriate hygiene as needed including keeping skin clean and dry  - Evaluate need for skin moisturizer/barrier cream  - Collaborate with interdisciplinary team   - Patient/family teaching  - Consider wound care consult   Outcome: Progressing     Problem: DISCHARGE PLANNING - CARE MANAGEMENT  Goal: Discharge to post-acute care or home with appropriate resources  Description  INTERVENTIONS:  - Conduct assessment to determine patient/family and health care team treatment goals, and need for post-acute services based on payer coverage, community resources, and patient preferences, and barriers to discharge  - Address psychosocial, clinical, and financial barriers to discharge as identified in assessment in conjunction with the patient/family and health care team  - Arrange appropriate level of post-acute services according to patient's   needs and preference and payer coverage in collaboration with the physician and health care team  - Communicate with and update the patient/family, physician, and health care team regarding progress on the discharge plan  - Arrange appropriate transportation to post-acute venues    Need to reassess    Outcome: Progressing

## 2020-02-13 NOTE — PROGRESS NOTES
NEPHROLOGY PROGRESS NOTE   Aldair Penny [de-identified] y o  male MRN: [id number]  Unit/Bed#: -01 Encounter: [id number]    Assessment/Plan:       1  Acute Kidney Injury: Present on Admission  ? Upon review of the medical record, baseline creatinine appears to be around 2 1-2 4 mg/dL, frequent incidence of AKIs  ? Admission creatinine 2 07 mg/dL -> 3 07 mg/dL -> 2 67 mg/dL today  ? Urine chemistries done 2/8 suggest intrarenal cause of SILVERIO  ? Received IVF early in admission and then given 20 mg IV lasix x2 on 2/11/12 for scrotal edema  ? Non oliguric with urinary catheter patent and draining  ? Hold off on further diuresis and IV hydration and assess daily need  ? Continue to monitor strict, IO, daily weight, BMPs, adjust meds for eGFR  ? Avoid nephrotoxins, avoid hypotension and wide variation in blood pressure  ? No indication for urgent or emergent renal replacement therapy  Monitor for renal recovery  2  Stage 4 Chronic Kidney Disease  ? Baseline creatinine as above  ? Longstanding diabetes mellitus and neuroendocrine carcinoma  ? Follow's in [facility] office, last visit 6/59/74  3  Metabolic Acidosis  ? 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 Patient and draining appropriately  ? Continue flomax  5  Hypertension associated with CKD 4  6  Neuroendocrine Carcinoma with Liver Metastases  ? Lancreotide injections 120 mg every 4 weeks, last dose 1/14/20  ? Last oncology visit 1/7/20  7  Cholecystitis  ? POD # 6 open cholecystectomy, gen/surg leading case  8  DM 2 associated with CKD 4   ? Most recent a1c 8 1%  ? Needs good blood sugar control  9  Hypokalemia  ? Will replete with 80 mEq total today split up with meals/bedtime  ? Check mag tomorrow   10   Protein Calorie Malnutrition  ? Continue prosource and adjust per patient's preference     11   Anemia  · Hgb trending down, 8 today  · Check iron panel   · Previous FOBT done 11/2019 negative      HPI/24hr events:   [de-identified] yo male presented 2/5/20 with acute cholecystitis, underwent open cholecystecomty on 2/7/20  Renal consultation for acute kidney injury       24 hour events: BONILLA drain removed yesterday per surgery  ROS  Seen and examined in bed  States he feels ok and is anxious to go home  Denies chest pain, shortness of breath, dizziness, nausea, vomiting, diarrhea, pain at urinary catheter insertion site  A complete 10 point review of systems have been performed and are otherwise negative  Historical Information   Past Medical History:   Diagnosis Date    Acute pancreatitis without infection or necrosis 9/26/2019    Anemia of chronic renal failure     BPH (benign prostatic hyperplasia)     Cancer (HCC)     liver cancer    Cardiac disease     Chronic kidney disease, stage 3 (HCC)     Coronary artery disease     Diabetes mellitus (HCC)     DJD (degenerative joint disease)     Essential hypertension     Gait disturbance     Generalized weakness     GERD (gastroesophageal reflux disease)     Gout     History of transfusion     Hydronephrosis     Hyperlipidemia     Hypertension     Liver cancer ([facility] [address] )     Pressure injury of skin     Proteinuria     Renal disorder     Retention of urine     Thrombophlebitis migrans     Type 2 diabetes mellitus ([facility] [address] )     Type 2 diabetes 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, with long-term current use of insulin ([facility] [address] ) 1/23/2019     Past Surgical History:   Procedure Laterality Date    CHOLECYSTECTOMY LAPAROSCOPIC N/A 2/7/2020    Procedure: CHOLECYSTECTOMY LAPAROSCOPIC;  Surgeon:  Maddy Avalos MD;  Location: [facility] MAIN OR;  Service: General    CORONARY ANGIOPLASTY WITH STENT PLACEMENT      IR IMAGE GUIDED BIOPSY/ASPIRATION LIVER  1/24/2019    IR TUBE PLACEMENT ROQUE  9/6/2019     Social History   Social History     Substance and Sexual Activity   Alcohol Use Never    Frequency: Never     Social History     Substance and Sexual Activity   Drug Use Never     Social History     Tobacco Use   Smoking Status Never Smoker   Smokeless Tobacco Never Used       Family History:   Family History   Problem Relation Age of Onset    Cancer Mother     Hypertension Father     Cancer Brother     Cancer Maternal Grandmother     Cancer Paternal Aunt        Medications:  Pertinent medications were reviewed    Current Facility-Administered Medications:  acetaminophen 650 mg Oral Q6H PRN Krystle Viera PA-C   amLODIPine 10 mg Oral Daily Krystle Viera PA-C   b complex-vitamin C-folic acid 1 capsule Oral Daily With [facility] [facility] Rory PA-C   cholecalciferol 1,000 Units Oral Daily Krystle Viera PA-C   colchicine 0 3 mg Oral Daily Judi Severe, CRNP   heparin (porcine) 5,000 Units Subcutaneous [facility] Krystle Viera PA-C   HYDROmorphone 0 5 mg Intravenous Q4H PRN Krystle Viera PA-C   insulin glargine 15 Units Subcutaneous HS Russell Avendaño MD   insulin lispro 1-5 Units Subcutaneous TID AC Krystle Viera PA-C   insulin lispro 1-5 Units Subcutaneous HS Krystle Viera PA-C   metroNIDAZOLE 500 mg Oral Q8H [address] Maddy Avalos MD   ondansetron 4 mg Intravenous Q6H PRN Krystle Viera PA-C   oxyCODONE-acetaminophen 1 tablet Oral Q4H PRN Krystle Viera PA-C   oxyCODONE-acetaminophen 2 tablet Oral Q4H PRN Krystle Viera PA-C   pantoprazole 40 mg Oral Early Morning Krystle Viera PA-C   simethicone 80 mg Oral Q6H PRN Krystle Viera PA-C   sodium bicarbonate 650 mg Oral BID after meals Krystle Viera PA-C   sodium chloride (PF) 10 mL Intravenous Once per day on Mon Thu Libra Valadez PA-C   tamsulosin 0 4 mg Oral Daily With [facility] [employer] Rory PA-C         No Known Allergies      Vitals:   /80 (BP Location: Left arm)   Pulse 73   Temp (!) 97 3 °F (36 3 °C) (Temporal)   Resp 18   Ht 5' 8" (1 727 m)   Wt 74 6 kg (164 lb 7 4 oz)   SpO2 97%   BMI 25 01 kg/m²   Body mass index is 25 01 kg/m²    SpO2: 97 %,   SpO2 Activity: At Rest,   O2 Device: None (Room air)      Intake/Output Summary (Last 24 hours) at 2/13/2020 0845  Last data filed at 2/13/2020 3464  Gross per 24 hour   Intake 240 ml   Output 2500 ml   Net -2260 ml     Invasive Devices     Peripheral Intravenous Line            Peripheral IV 02/11/20 Distal;Right;Ventral (anterior) Forearm 2 days          Drain            Urethral Catheter 18 Fr  51 days                Physical Exam  General: chronically ill appearing male conscious, cooperative, in no acute distress  Eyes: conjunctivae pink, anicteric sclerae  ENT: lips and mucous membranes moist  Neck: supple, no JVD, no masses  Chest: essentially clear breath sounds bilateral, no crackles, ronchus or wheezings  CVS: S1 & S2, normal rate, regular rhythm  Abdomen: soft, non-tender, non-distended, normoactive bowel sounds  Incision site with dressing clean dry and intact  Extremities: no edema of both legs  Skin: no rash, pale   Neuro: awake, alert, oriented      Diagnostic Data:  Lab: I have personally reviewed pertinent lab results  ,   CBC:  Results from last 7 days   Lab Units 02/11/20  0456   WBC Thousand/uL 17 80*   HEMOGLOBIN g/dL 8 0*   HEMATOCRIT % 24 3*   PLATELETS Thousands/uL 268      CMP: Lab Results   Component Value Date    SODIUM 140 02/13/2020    K 3 2 (L) 02/13/2020     02/13/2020    CO2 24 02/13/2020    BUN 22 02/13/2020    CREATININE 2 67 (H) 02/13/2020    CALCIUM 8 0 (L) 02/13/2020    EGFR 22 02/13/2020   ,   PT/INR: No results found for: PT, INR,   Magnesium: No components found for: MAG,  Phosphorous: No results found for: PHOS    Microbiology:  @LABRCNTIP,(urinecx:7)@        [facility]    Portions of the record may have been created with voice recognition software  Occasional wrong word or "sound a like" substitutions may have occurred due to the inherent limitations of voice recognition software  Read the chart carefully and recognize, using context, where substitutions have occurred

## 2020-02-13 NOTE — QUICK NOTE
I am aware that the patient's Hemoglobin is 9 0 today, it was 8 0 2 days ago, and in the interim he has been diuresed and has been having diarrhea  His intravascular volume has contracted, not expanded  Today he was orthostatic, and unable to participate in Physical Therapy  Further, his anemia is probably contributing to his elevated creatinine  He has symptomatic chronic anemia, exacerbated by his recent surgery  He has been transfused in the past, and I believe that he requires transfusion with at least one unit of Packed Red Cells as well as careful fluid management

## 2020-02-13 NOTE — PLAN OF CARE
Problem: PHYSICAL THERAPY ADULT  Goal: Performs mobility at highest level of function for planned discharge setting  See evaluation for individualized goals  Description  Treatment/Interventions: Functional transfer training, LE strengthening/ROM, Elevations, Therapeutic exercise, Endurance training, Patient/family training, Equipment eval/education, Bed mobility, Gait training, Spoke to nursing, Spoke to case management  Equipment Recommended: (continue RW use)       See flowsheet documentation for full assessment, interventions and recommendations  Outcome: Not Progressing  Note:   Prognosis: Fair  Problem List: Decreased strength, Decreased endurance, Impaired balance, Decreased mobility, Impaired hearing, Pain  Assessment: Pt seen for PT treatment session this date with interventions consisting of therapeutic activity consisting of training: bed mobility, supine<>sit transfers and static sitting tolerance at EOB for 10 minutes w/ B UE support  Pt agreeable to PT treatment session upon arrival, pt found supine in bed, in no apparent distress  In comparison to previous session, pt with no improvements as evidenced by decreased tolerance for activity  Post session: pt returned BTB and all needs in reach Continue to recommend STR at time of d/c in order to maximize pt's functional independence and safety w/ mobility  Pt continues to be functioning below baseline level, and remains limited 2* factors listed above and including decreased strength, endurance & safe functional mobility  PT will continue to see pt while here in order to address the deficits listed above and provide interventions consistent w/ POC in effort to achieve STGs  Barriers to Discharge: Inaccessible home environment     Recommendation: Short-term skilled PT     PT - OK to Discharge: Yes(when med cleared to STR)    See flowsheet documentation for full assessment

## 2020-02-13 NOTE — SOCIAL WORK
As per Dr Kyara Aguiar the pt will require a unit of PRBC  Pt will go home with [facility] upon discharge  [facility] will transport  Pt did not want SNF

## 2020-02-14 LAB
ANION GAP SERPL CALCULATED.3IONS-SCNC: 10 MMOL/L (ref 4–13)
BUN SERPL-MCNC: 17 MG/DL (ref 7–25)
CALCIUM SERPL-MCNC: 8.1 MG/DL (ref 8.6–10.5)
CHLORIDE SERPL-SCNC: 107 MMOL/L (ref 98–107)
CO2 SERPL-SCNC: 25 MMOL/L (ref 21–31)
CREAT SERPL-MCNC: 2.4 MG/DL (ref 0.7–1.3)
ERYTHROCYTE [DISTWIDTH] IN BLOOD BY AUTOMATED COUNT: 15.4 % (ref 11.5–14.5)
FERRITIN SERPL-MCNC: 646 NG/ML (ref 8–388)
FOLATE SERPL-MCNC: >20 NG/ML (ref 3.1–17.5)
GFR SERPL CREATININE-BSD FRML MDRD: 25 ML/MIN/1.73SQ M
GLUCOSE SERPL-MCNC: 174 MG/DL (ref 65–140)
GLUCOSE SERPL-MCNC: 199 MG/DL (ref 65–140)
GLUCOSE SERPL-MCNC: 241 MG/DL (ref 65–140)
GLUCOSE SERPL-MCNC: 314 MG/DL (ref 65–140)
GLUCOSE SERPL-MCNC: 73 MG/DL (ref 65–99)
GLUCOSE SERPL-MCNC: 76 MG/DL (ref 65–140)
HCT VFR BLD AUTO: 31.8 % (ref 42–47)
HGB BLD-MCNC: 10.5 G/DL (ref 14–18)
IRON SATN MFR SERPL: 58 %
IRON SERPL-MCNC: 130 UG/DL (ref 65–175)
MAGNESIUM SERPL-MCNC: 1.6 MG/DL (ref 1.9–2.7)
MCH RBC QN AUTO: 29.7 PG (ref 26–34)
MCHC RBC AUTO-ENTMCNC: 33.1 G/DL (ref 31–37)
MCV RBC AUTO: 90 FL (ref 81–99)
PHOSPHATE SERPL-MCNC: 3 MG/DL (ref 3–5.5)
PLATELET # BLD AUTO: 267 THOUSANDS/UL (ref 149–390)
PMV BLD AUTO: 7.7 FL (ref 8.6–11.7)
POTASSIUM SERPL-SCNC: 3.5 MMOL/L (ref 3.5–5.5)
RBC # BLD AUTO: 3.54 MILLION/UL (ref 4.3–5.9)
SODIUM SERPL-SCNC: 142 MMOL/L (ref 134–143)
TIBC SERPL-MCNC: 225 UG/DL (ref 250–450)
WBC # BLD AUTO: 11.7 THOUSAND/UL (ref 4.8–10.8)

## 2020-02-14 PROCEDURE — 83735 ASSAY OF MAGNESIUM: CPT | Performed by: NURSE PRACTITIONER

## 2020-02-14 PROCEDURE — 99233 SBSQ HOSP IP/OBS HIGH 50: CPT | Performed by: NURSE PRACTITIONER

## 2020-02-14 PROCEDURE — 82728 ASSAY OF FERRITIN: CPT | Performed by: NURSE PRACTITIONER

## 2020-02-14 PROCEDURE — 82948 REAGENT STRIP/BLOOD GLUCOSE: CPT

## 2020-02-14 PROCEDURE — 99024 POSTOP FOLLOW-UP VISIT: CPT | Performed by: PHYSICIAN ASSISTANT

## 2020-02-14 PROCEDURE — 84100 ASSAY OF PHOSPHORUS: CPT | Performed by: NURSE PRACTITIONER

## 2020-02-14 PROCEDURE — 83540 ASSAY OF IRON: CPT | Performed by: NURSE PRACTITIONER

## 2020-02-14 PROCEDURE — 85027 COMPLETE CBC AUTOMATED: CPT | Performed by: PHYSICIAN ASSISTANT

## 2020-02-14 PROCEDURE — 82746 ASSAY OF FOLIC ACID SERUM: CPT | Performed by: NURSE PRACTITIONER

## 2020-02-14 PROCEDURE — 83550 IRON BINDING TEST: CPT | Performed by: NURSE PRACTITIONER

## 2020-02-14 PROCEDURE — 80048 BASIC METABOLIC PNL TOTAL CA: CPT | Performed by: NURSE PRACTITIONER

## 2020-02-14 RX ORDER — MAGNESIUM SULFATE HEPTAHYDRATE 40 MG/ML
2 INJECTION, SOLUTION INTRAVENOUS ONCE
Status: COMPLETED | OUTPATIENT
Start: 2020-02-14 | End: 2020-02-14

## 2020-02-14 RX ORDER — SIMETHICONE 80 MG
160 TABLET,CHEWABLE ORAL ONCE
Status: COMPLETED | OUTPATIENT
Start: 2020-02-14 | End: 2020-02-14

## 2020-02-14 RX ORDER — CLONIDINE HYDROCHLORIDE 0.1 MG/1
0.1 TABLET ORAL
Status: DISCONTINUED | OUTPATIENT
Start: 2020-02-14 | End: 2020-02-20 | Stop reason: HOSPADM

## 2020-02-14 RX ORDER — POTASSIUM CHLORIDE 14.9 MG/ML
20 INJECTION INTRAVENOUS 2 TIMES DAILY
Status: COMPLETED | OUTPATIENT
Start: 2020-02-14 | End: 2020-02-15

## 2020-02-14 RX ORDER — SIMETHICONE 80 MG
80 TABLET,CHEWABLE ORAL EVERY 6 HOURS PRN
Status: DISCONTINUED | OUTPATIENT
Start: 2020-02-14 | End: 2020-02-20 | Stop reason: HOSPADM

## 2020-02-14 RX ADMIN — METRONIDAZOLE 500 MG: 500 TABLET, FILM COATED ORAL at 05:28

## 2020-02-14 RX ADMIN — AMLODIPINE BESYLATE 10 MG: 5 TABLET ORAL at 10:22

## 2020-02-14 RX ADMIN — VITAMIN D, TAB 1000IU (100/BT) 1000 UNITS: 25 TAB at 10:22

## 2020-02-14 RX ADMIN — OXYCODONE HYDROCHLORIDE AND ACETAMINOPHEN 1 TABLET: 5; 325 TABLET ORAL at 02:56

## 2020-02-14 RX ADMIN — INSULIN GLARGINE 15 UNITS: 100 INJECTION, SOLUTION SUBCUTANEOUS at 21:54

## 2020-02-14 RX ADMIN — TAMSULOSIN HYDROCHLORIDE 0.4 MG: 0.4 CAPSULE ORAL at 17:11

## 2020-02-14 RX ADMIN — Medication 1 CAPSULE: at 17:11

## 2020-02-14 RX ADMIN — MAGNESIUM SULFATE IN WATER 2 G: 40 INJECTION, SOLUTION INTRAVENOUS at 17:11

## 2020-02-14 RX ADMIN — COLCHICINE 0.3 MG: 0.6 TABLET, FILM COATED ORAL at 10:21

## 2020-02-14 RX ADMIN — PANTOPRAZOLE SODIUM 40 MG: 40 TABLET, DELAYED RELEASE ORAL at 05:28

## 2020-02-14 RX ADMIN — METRONIDAZOLE 500 MG: 500 TABLET, FILM COATED ORAL at 21:54

## 2020-02-14 RX ADMIN — SODIUM BICARBONATE 650 MG: 650 TABLET ORAL at 10:22

## 2020-02-14 RX ADMIN — HEPARIN SODIUM 5000 UNITS: 5000 INJECTION INTRAVENOUS; SUBCUTANEOUS at 14:20

## 2020-02-14 RX ADMIN — POTASSIUM CHLORIDE 20 MEQ: 14.9 INJECTION, SOLUTION INTRAVENOUS at 19:47

## 2020-02-14 RX ADMIN — HEPARIN SODIUM 5000 UNITS: 5000 INJECTION INTRAVENOUS; SUBCUTANEOUS at 05:28

## 2020-02-14 RX ADMIN — SODIUM BICARBONATE 650 MG: 650 TABLET ORAL at 17:11

## 2020-02-14 RX ADMIN — INSULIN LISPRO 2 UNITS: 100 INJECTION, SOLUTION INTRAVENOUS; SUBCUTANEOUS at 17:11

## 2020-02-14 RX ADMIN — SIMETHICONE CHEW TAB 80 MG 160 MG: 80 TABLET ORAL at 14:20

## 2020-02-14 RX ADMIN — HEPARIN SODIUM 5000 UNITS: 5000 INJECTION INTRAVENOUS; SUBCUTANEOUS at 21:54

## 2020-02-14 RX ADMIN — INSULIN LISPRO 1 UNITS: 100 INJECTION, SOLUTION INTRAVENOUS; SUBCUTANEOUS at 12:25

## 2020-02-14 RX ADMIN — INSULIN LISPRO 1 UNITS: 100 INJECTION, SOLUTION INTRAVENOUS; SUBCUTANEOUS at 21:54

## 2020-02-14 RX ADMIN — METRONIDAZOLE 500 MG: 500 TABLET, FILM COATED ORAL at 14:20

## 2020-02-14 NOTE — DISCHARGE INSTRUCTIONS
Acute Kidney Injury   WHAT YOU NEED TO KNOW:   What is acute kidney injury? Acute kidney injury (SILVERIO) is also called acute kidney failure, or acute renal failure  SILVERIO happens when your kidneys suddenly stop working correctly  Normally, the kidneys remove fluid, chemicals, and waste from your blood  These wastes are turned into urine by your kidneys  SILVERIO usually happens over hours or days  When you have SILVERIO, your kidneys do not remove the waste, chemicals, or extra fluid from your body  A normal amount of urine is not produced  SILVERIO is usually temporary, but it may become a chronic kidney condition  What causes SILVERIO? · Decreased blood flow to the kidney, such as from hypercalcemia (high blood calcium level) or severe heart disease     · A disease or condition that affects the kidneys, such as hypertension (high blood pressure) or diabetes     · A blockage in the kidney or ureter, such as a kidney or bladder stone, enlarged prostate, or tumor  What increases my risk for SILVERIO? · Being hospitalized with a serious illness, such as sepsis or severe burns    · Peripheral artery disease    · Older age in adults    · Kidney or liver diseases    · Medical conditions such as dehydration, hypertension, diabetes, or heart failure    · Certain medicines such as NSAIDs  What are the signs and symptoms of SILVERIO? You may not have any symptoms with early or mild SILVERIO  As SILVERIO progresses, you may have any of the following:  · Decrease in the amount of urine or no urination    · Swelling in your arms, legs, or feet     · Weakness, drowsiness, or no appetite    · Nausea, flank pain, muscle twitching or muscle cramps    · Itchy skin, or your breath or body smells like urine    · Behavior changes, confusion, disorientation, or seizures  How is SLIVERIO diagnosed? There are many causes of SILVERIO   To find the cause and to treat your SILVERIO correctly, your healthcare provider may do any of the following:  · Blood and urine tests  show how well your kidneys are working  They may also show the cause of your SILVERIO  · An x-ray or ultrasound  may show problems with your kidneys  Your healthcare provider may see a blockage in your kidneys  He or she may see narrowing of the artery that sends blood to your kidneys  You may be given contrast liquid to help your kidneys show up better in the pictures  Tell the healthcare provider if you have ever had an allergic reaction to contrast liquid  How is SILVERIO treated? Treatment depends upon the cause of your acute kidney injury and how severe it is  Usually, SILVERIO will be monitored in the hospital  If you have mild SILVERIO, you may be able to go home to recover  Your healthcare providers will treat the cause of your SILVERIO  You may need IV fluids if your SILVERIO was caused by little or no fluid in your body  You may need dialysis to remove waste and extra fluid from your body  Your healthcare provider may tell you to eat food low in sodium (salt), potassium, phosphorus, or protein  You may need to see a dietitian before you are discharged to get help with planning your meals  How can I prevent SILVERIO? · Manage other health conditions  such as diabetes, high blood pressure, or heart disease  These conditions increase your risk for acute kidney injury  Take your medicines for these conditions as directed  Also, monitor your blood sugar and blood pressure levels as directed  Contact your healthcare provider if your levels are not in the range he or she says it should be  · Talk to your healthcare provider before you take over-the-counter-medicine  NSAIDs, stomach medicine, or laxatives may harm your kidneys and increase your risk for acute kidney injury  If it is okay to take the medicine, follow the directions on the package  Do not take more than directed  · Tell healthcare providers if you have had SILVERIO  before you get contrast liquid for an x-ray or CT scan   Your healthcare provider may give you medicine to prevent kidney problems caused by the liquid  CARE AGREEMENT:   You have the right to help plan your care  Learn about your health condition and how it may be treated  Discuss treatment options with your caregivers to decide what care you want to receive  You always have the right to refuse treatment  The above information is an  only  It is not intended as medical advice for individual conditions or treatments  Talk to your doctor, nurse or pharmacist before following any medical regimen to see if it is safe and effective for you  © 2017 [address] [employer]  Information is for End User's use only and may not be sold, redistributed or otherwise used for commercial purposes  All illustrations and images included in CareNotes® are the copyrighted property of A D A M , Inc  or [employer]  Open Cholecystectomy   WHAT YOU NEED TO KNOW:   An open cholecystectomy is surgery to remove your gallbladder through an incision in your abdomen  DISCHARGE INSTRUCTIONS:   Medicines:   · Pain medicine: You may need medicine to take away or decrease pain  ¨ Learn how to take your medicine  Ask what medicine and how much you should take  Be sure you know how, when, and how often to take it  ¨ Do not wait until the pain is severe before you take your medicine  Tell caregivers if your pain does not decrease  ¨ Pain medicine can make you dizzy or sleepy  Prevent falls by calling someone when you get out of bed or if you need help  · Antibiotics: This medicine is given to fight or prevent an infection caused by bacteria  Always take your antibiotics exactly as ordered by your healthcare provider  Do not stop taking your medicine unless directed by your healthcare provider  Never save antibiotics or take leftover antibiotics that were given to you for another illness  · Take your medicine as directed    Contact your healthcare provider if you think your medicine is not helping or if you have side effects  Tell him or her if you are allergic to any medicine  Keep a list of the medicines, vitamins, and herbs you take  Include the amounts, and when and why you take them  Bring the list or the pill bottles to follow-up visits  Carry your medicine list with you in case of an emergency  Follow up with your healthcare provider as directed: You may need to return to have your stitches removed  Your healthcare provider will check your incision for signs of infection  If you have a drain, he will remove it  Write down your questions so you remember to ask them during your visits  Eat a variety of healthy foods: This may help you have more energy and heal faster  Healthy foods include fruit, vegetables, whole-grain breads, low-fat dairy products, beans, lean meat, and fish  Ask if you need to be on a special diet  Bathing with stitches: Follow your healthcare provider's instructions on when you can bathe  Gently wash the part of your body that has the stitches  Do not rub on the stitches to dry your skin  Pat the area gently with a towel  When the area is dry, put on a clean, new bandage as directed  Contact your healthcare provider if:   · You have a fever  · You have nausea and vomiting  · You are constipated or urinate less than usual     · You have questions or concerns about your condition, surgery, or care  Seek care immediately or call 911 if:   · You feel full and cannot burp or vomit  · Blood soaks through your bandage  · Your incision is red, swollen, or has pus coming from it  · Your stitches come apart  · Your vomit is greenish, looks like coffee grounds, or has blood in it  · Your abdomen is severely painful and swollen  · Your arm or leg feels warm, tender, and painful  It may look swollen and red  · You feel lightheaded and have shortness of breath all of a sudden  · You have chest pain  You may have more pain when you take a deep breath or cough   You may cough up blood  © 2017 [address] [facility] Information is for End User's use only and may not be sold, redistributed or otherwise used for commercial purposes  All illustrations and images included in CareNotes® are the copyrighted property of A D A M , Inc  or [employer]  The above information is an  only  It is not intended as medical advice for individual conditions or treatments  Talk to your doctor, nurse or pharmacist before following any medical regimen to see if it is safe and effective for you

## 2020-02-14 NOTE — PROGRESS NOTES
Progress Note - General Surgery   [place] [address] y o  male MRN: [id number]  Unit/Bed#: -01 Encounter: [id number]    Assessment:  [address] y o  M POD 7 s/p open monet for perforated gangrenous cholecystitis  -afebrile, VSS  -hgb- 10 5 (transfused 1U 2/13 for symptomatic chronic anemia)  -Improving- WBC 11 7 (12 2), Cr 2 4 (2 67)  -Appetite improving  -Still having very soft stool/diarrhea x 1, no blood  -incisional pain improving- clean, dry, intact      Plan:  Continue Flagyl for C diff  Encourage PO intake   PT/OT  PRN pain meds  DVT prophylaxis  Home w/ VNA support when stable for d/c      Subjective: Patient is doing well, states he is feeling slightly better than yesterday in regards to pain  Patient has not ambulated yesterday since getting lightheaded with PT  Patient had fecal incontinence x 1 this morning, that was extremely soft/diarrhea, no blood  He continues to tolerate diet without N/V, and states his appetite is improving daily  Urinating without issues  Denies fever, chills, CP, SOB, or calf tenderness  Objective:     Blood pressure 165/78, pulse 69, temperature (!) 96 8 °F (36 °C), temperature source Temporal, resp  rate 20, height 5' 8" (1 727 m), weight 73 5 kg (162 lb 0 6 oz), SpO2 98 %  ,Body mass index is 24 64 kg/m²  Intake/Output Summary (Last 24 hours) at 2/14/2020 0744  Last data filed at 2/13/2020 2138  Gross per 24 hour   Intake 1050 ml   Output 1375 ml   Net -325 ml       Invasive Devices     Peripheral Intravenous Line            Peripheral IV 02/11/20 Distal;Right;Ventral (anterior) Forearm 3 days          Drain            Urethral Catheter 18 Fr  52 days                Physical Exam   Abdomen- soft, moderately tender around RUQ incision, c/d/i  BONILLA site with minimal drainage, dressing intact  Non-distended  BS x 4  Extremities- no calf tenderness or pedal edema      Scheduled Meds:  Current Facility-Administered Medications:  acetaminophen 650 mg Oral Q6H PRN Lindsay Osman, ROBBY   amLODIPine 10 mg Oral Daily [facility], [facility]   b complex-vitamin C-folic acid 1 capsule Oral Daily With D[facility], ROBBY   cholecalciferol 1,000 Units Oral Daily [facility], ROBBY   colchicine 0 3 mg Oral Daily RIKA Hogan   heparin (porcine) 5,000 Units Subcutaneous [facility], ROBBY   HYDROmorphone 0 5 mg Intravenous Q4H PRN [facility], ORBBY   insulin glargine 15 Units Subcutaneous HS Rachel Gilmore MD   insulin lispro 1-5 Units Subcutaneous TID AC [facility], ROBBY   insulin lispro 1-5 Units Subcutaneous HS [facility], ROBBY   metroNIDAZOLE 500 mg Oral [facility] Zulma Ortiz MD   ondansetron 4 mg Intravenous Q6H PRN [facility], ROBBY   oxyCODONE-acetaminophen 1 tablet Oral Q4H PRN [facility], ROBBY   oxyCODONE-acetaminophen 2 tablet Oral Q4H PRN [facility], ROBBY   pantoprazole 40 mg Oral Early Morning [facility], ROBBY   simethicone 80 mg Oral Q6H PRN [facility], ROBBY   sodium bicarbonate 650 mg Oral BID after meals [facility], ROBBY   sodium chloride (PF) 10 mL Intravenous Once per day on Mon Thu Libra Valadez PA-C   tamsulosin 0 4 mg Oral Daily [facility] ROBBY Valadez     Continuous Infusions:   PRN Meds:   acetaminophen    HYDROmorphone    ondansetron    oxyCODONE-acetaminophen    oxyCODONE-acetaminophen    simethicone      Lab, Imaging and other studies:I have personally reviewed pertinent lab results      VTE Pharmacologic Prophylaxis: Heparin  VTE Mechanical Prophylaxis: sequential compression device      Corrie Lyle PA-C  2/14/2020 7:44 AM

## 2020-02-14 NOTE — SOCIAL WORK
Pt not yet stable for discharge at this time  CM met with pt and [facility] Cotter at bedside  IMM reviewed and signed  Copy provided  Discharge plan remains for pt to return home with [facility] and [facility] [address] services  CM to follow

## 2020-02-14 NOTE — PROGRESS NOTES
NEPHROLOGY PROGRESS NOTE   Isaak Myers [de-identified] y o  male MRN: [id number]  Unit/Bed#: -01 Encounter: [id number]    Assessment/Plan:          1  Acute Kidney Injury: Present on Admission  ? Upon review of the medical record, baseline creatinine appears to be around 2 1-2 4 mg/dL, frequent incidence of AKIs  ? Admission creatinine 2 07 mg/dL -> 3 07 mg/dL -> 2 4 mg/dL today  ? Urine chemistries done 2/8 suggest intrarenal cause of SILVERIO  ? Received IVF early in admission and then given 20 mg IV lasix x2 on 2/11/12 for scrotal edema  ? Non oliguric with urinary catheter patent and draining  ? Will continue to hold off on further IV hydration and diuresis today  ? Continue to monitor strict, IO, daily weight, BMPs, adjust meds for eGFR  ? Avoid nephrotoxins, avoid hypotension and wide variation in blood pressure  ? No indication for urgent or emergent renal replacement therapy  Monitor for renal recovery  2  Stage 4 Chronic Kidney Disease  ? Baseline creatinine as above  ? Longstanding diabetes mellitus and neuroendocrine carcinoma  ? Follow's in [facility], last visit 6/05/06  3  Metabolic Acidosis  ? 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 Patient and draining appropriately  ? Continue flomax  5  Hypertension associated with CKD 4  6  Neuroendocrine Carcinoma with Liver Metastases  ? Lancreotide injections 120 mg every 4 weeks, last dose 1/14/20  ? Last oncology visit 1/7/20  7  Cholecystitis  ? POD # 7 open cholecystectomy, gen/surg leading case  8  DM 2 associated with CKD 4   ? Most recent a1c 8 1%  ? Needs good blood sugar control  9  Hypokalemia  ? Will replete with 40 mEq IV potassium total due to GI upset today  ? Will also give 2 g mag IV  10   Protein Calorie Malnutrition  ? Continue prosource and adjust per patient's preference     11  Anemia  ? Hgb 10 5 today, s/p PRBC transfusion   12  GI upset  · Complaining of GI upset/flatus   Will order simethicone       HPI/24hr events:   [de-identified] yo male presented 2/5/20 with acute cholecystitis, underwent open cholecystecomty on 2/7/20  Renal consultation for acute kidney injury        ROS  Patient complaining of GI upset, gas  Denies chest pain, shortness of breath, dizziness, nausea, vomiting, diarrhea  Is having multiple bowel movements per day  A complete 10 point review of systems have been performed and are otherwise negative  Historical Information   Past Medical History:   Diagnosis Date    Acute pancreatitis without infection or necrosis 9/26/2019    Anemia of chronic renal failure     BPH (benign prostatic hyperplasia)     Cancer (HCC)     liver cancer    Cardiac disease     Chronic kidney disease, stage 3 (HCC)     Coronary artery disease     Diabetes mellitus (HCC)     DJD (degenerative joint disease)     Essential hypertension     Gait disturbance     Generalized weakness     GERD (gastroesophageal reflux disease)     Gout     History of transfusion     Hydronephrosis     Hyperlipidemia     Hypertension     [facility] cancer ([facility] )     Pressure injury of skin     Proteinuria     Renal disorder     Retention of urine     Thrombophlebitis migrans     Type 2 diabetes mellitus ([facility] [address] )     Type 2 diabetes 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, with long-term current use of insulin [facility] 1/23/2019     Past Surgical History:   Procedure Laterality Date    CHOLECYSTECTOMY LAPAROSCOPIC N/A 2/7/2020    Procedure: CHOLECYSTECTOMY LAPAROSCOPIC;  Surgeon:  Dustin De Anda MD;  [facility] MA[facility];  Service: General    CORONARY ANGIOPLASTY WITH STENT PLACEMENT      IR IMAGE GUIDED [facility] LIVER  1/24/2019    IR TUBE PLACEMENT ROQUE  9/6/2019     Social History   Social History     Substance and Sexual Activity   Alcohol Use Never    Frequency: Never     Social History     Substance and Sexual Activity   Drug Use Never     Social History     Tobacco Use   Smoking Status Never Smoker   Smokeless Tobacco Never Used       Family History:   Family History   Problem Relation Age of Onset    Cancer Mother     Hypertension Father     Cancer Brother     Cancer Maternal Grandmother     Cancer Paternal Aunt        Medications:  Pertinent medications were reviewed    Current Facility-Administered Medications:  acetaminophen 650 mg Oral Q6H PRN Yogesh Burns PA-C   amLODIPine 10 mg Oral Daily Yogesh Burns PA-C   b complex-vitamin C-folic acid 1 capsule Oral Daily With [facility] Rory PA-C   cholecalciferol 1,000 Units Oral Daily Yogesh Burns, PA-C   colchicine 0 3 mg Oral Daily RIKA Barker   heparin (porcine) 5,000 Units Subcutaneous [facility] Yogesh Burns PA-C   HYDROmorphone 0 5 mg Intravenous Q4H PRN Yogesh Burns, ROBBY   insulin glargine 15 Units Subcutaneous HS Demetri Arguello MD   insulin lispro 1-5 Units Subcutaneous TID AC Yogesh Burns PA-C   insulin lispro 1-5 Units Subcutaneous HS Yogesh Burns PA-C   metroNIDAZOLE 500 mg Oral Q8H [facility] Fina Hopkins MD   ondansetron 4 mg Intravenous Q6H PRN Yogesh Burns, ROBBY   oxyCODONE-acetaminophen 1 tablet Oral Q4H PRN Yogesh Burns, PA-FOUZIA   oxyCODONE-acetaminophen 2 tablet Oral Q4H PRN Yogesh Burns, PA-FOUZIA   pantoprazole 40 mg Oral Early Morning RL Kamara-FOUZIA   simethicone 80 mg Oral Q6H PRN Yogesh Burns, ROBBY   sodium bicarbonate 650 mg Oral BID after meals Yogesh Burns PA-C   sodium chloride (PF) 10 mL Intravenous Once per day on Mon Thu Libra Valadez PA-C   tamsulosin 0 4 mg Oral Daily With [facility] ROBBY Valadez         No Known Allergies      Vitals:   /78 (BP Location: Left arm)   Pulse 69   Temp (!) 96 8 °F (36 °C) (Temporal)   Resp 20   Ht 5' 8" (1 727 m)   Wt 73 5 kg (162 lb 0 6 oz)   SpO2 98%   BMI 24 64 kg/m²   Body mass index is 24 64 kg/m²    SpO2: 98 %,   SpO2 Activity: At Rest,   O2 Device: None (Room air)      Intake/Output Summary (Last 24 hours) at 2/14/2020 2775  Last data filed at 2/14/2020 1300  Gross per 24 hour   Intake 1010 ml   Output 700 ml   Net 310 ml     Invasive Devices     Peripheral Intravenous Line            Peripheral IV 02/11/20 Distal;Right;Ventral (anterior) Forearm 3 days          Drain            Urethral Catheter 18 Fr  52 days                Physical Exam  General: chronically ill appearing male  conscious, cooperative, in no acute distress  Eyes: conjunctivae pink, anicteric sclerae  ENT: lips and mucous membranes moist  Neck: supple, no JVD, no masses  Chest: diminished breath sounds bilateral, no crackles, ronchus or wheezings  CVS: distinct S1 & S2, normal rate, regular rhythm  Abdomen: soft, non-tender, non-distended, hyperactive bowel sounds  Extremities: no edema of both legs  Skin: no rash  Neuro: awake, alert, oriented      Diagnostic Data:  Lab: I have personally reviewed pertinent lab results  ,   CBC:  Results from last 7 days   Lab Units 02/14/20  0531   WBC Thousand/uL 11 70*   HEMOGLOBIN g/dL 10 5*   HEMATOCRIT % 31 8*   PLATELETS Thousands/uL 267      CMP: Lab Results   Component Value Date    SODIUM 142 02/14/2020    K 3 5 02/14/2020     02/14/2020    CO2 25 02/14/2020    BUN 17 02/14/2020    CREATININE 2 40 (H) 02/14/2020    CALCIUM 8 1 (L) 02/14/2020    EGFR 25 02/14/2020   ,   PT/INR: No results found for: PT, INR,   Magnesium: No components found for: MAG,  Phosphorous:   Lab Results   Component Value Date    PHOS 3 0 02/14/2020       Microbiology:  @LABRCNTIP,(urinecx:7)@        RIKA Baltazar    Portions of the record may have been created with voice recognition software  Occasional wrong word or "sound a like" substitutions may have occurred due to the inherent limitations of voice recognition software  Read the chart carefully and recognize, using context, where substitutions have occurred

## 2020-02-14 NOTE — PLAN OF CARE
Problem: Nutrition/Hydration-ADULT  Goal: Nutrient/Hydration intake appropriate for improving, restoring or maintaining nutritional needs  Description  Monitor and assess patient's nutrition/hydration status for malnutrition  Collaborate with interdisciplinary team and initiate plan and interventions as ordered  Monitor patient's weight and dietary intake as ordered or per policy  Utilize nutrition screening tool and intervene as necessary  Determine patient's food preferences and provide high-protein, high-caloric foods as appropriate  INTERVENTIONS:  - Monitor oral intake, urinary output, labs, and treatment plans  - Assess nutrition and hydration status and recommend course of action  - Evaluate amount of meals eaten  - Assist patient with eating if necessary   - Allow adequate time for meals  - Recommend/ encourage appropriate diets, oral nutritional supplements, and vitamin/mineral supplements  - Order, calculate, and assess calorie counts as needed  - Recommend, monitor, and adjust tube feedings and TPN/PPN based on assessed needs  - Assess need for intravenous fluids  - Provide specific nutrition/hydration education as appropriate  - Include patient/family/caregiver in decisions related to nutrition  Outcome: Progressing   Patient receiving CCD3 diet prosource BID  Intakes variable %  Patient has not experienced significant wt  change, BMI is 24 64  Recommend continue current diet,, consider d/c supplement change to gelatien for improved acceptability  No RD protocol to change diet order

## 2020-02-15 LAB
ANION GAP SERPL CALCULATED.3IONS-SCNC: 10 MMOL/L (ref 4–13)
BASOPHILS # BLD AUTO: 0.1 THOUSANDS/ΜL (ref 0–0.1)
BASOPHILS NFR BLD AUTO: 1 % (ref 0–2)
BUN SERPL-MCNC: 17 MG/DL (ref 7–25)
CALCIUM SERPL-MCNC: 8.6 MG/DL (ref 8.6–10.5)
CHLORIDE SERPL-SCNC: 105 MMOL/L (ref 98–107)
CO2 SERPL-SCNC: 26 MMOL/L (ref 21–31)
CREAT SERPL-MCNC: 2.25 MG/DL (ref 0.7–1.3)
EOSINOPHIL # BLD AUTO: 0.2 THOUSAND/ΜL (ref 0–0.61)
EOSINOPHIL NFR BLD AUTO: 1 % (ref 0–5)
ERYTHROCYTE [DISTWIDTH] IN BLOOD BY AUTOMATED COUNT: 15.4 % (ref 11.5–14.5)
GFR SERPL CREATININE-BSD FRML MDRD: 27 ML/MIN/1.73SQ M
GLUCOSE SERPL-MCNC: 203 MG/DL (ref 65–140)
GLUCOSE SERPL-MCNC: 234 MG/DL (ref 65–140)
GLUCOSE SERPL-MCNC: 317 MG/DL (ref 65–140)
GLUCOSE SERPL-MCNC: 73 MG/DL (ref 65–99)
GLUCOSE SERPL-MCNC: 82 MG/DL (ref 65–140)
HCT VFR BLD AUTO: 35.5 % (ref 42–47)
HGB BLD-MCNC: 11.9 G/DL (ref 14–18)
LYMPHOCYTES # BLD AUTO: 1.6 THOUSANDS/ΜL (ref 0.6–4.47)
LYMPHOCYTES NFR BLD AUTO: 13 % (ref 21–51)
MAGNESIUM SERPL-MCNC: 2.1 MG/DL (ref 1.9–2.7)
MCH RBC QN AUTO: 30.4 PG (ref 26–34)
MCHC RBC AUTO-ENTMCNC: 33.5 G/DL (ref 31–37)
MCV RBC AUTO: 91 FL (ref 81–99)
MONOCYTES # BLD AUTO: 0.9 THOUSAND/ΜL (ref 0.17–1.22)
MONOCYTES NFR BLD AUTO: 7 % (ref 2–12)
NEUTROPHILS # BLD AUTO: 9.9 THOUSANDS/ΜL (ref 1.4–6.5)
NEUTS SEG NFR BLD AUTO: 79 % (ref 42–75)
PHOSPHATE SERPL-MCNC: 3.2 MG/DL (ref 3–5.5)
PLATELET # BLD AUTO: 281 THOUSANDS/UL (ref 149–390)
PMV BLD AUTO: 7.6 FL (ref 8.6–11.7)
POTASSIUM SERPL-SCNC: 4.5 MMOL/L (ref 3.5–5.5)
RBC # BLD AUTO: 3.9 MILLION/UL (ref 4.3–5.9)
SODIUM SERPL-SCNC: 141 MMOL/L (ref 134–143)
WBC # BLD AUTO: 12.5 THOUSAND/UL (ref 4.8–10.8)

## 2020-02-15 PROCEDURE — 83735 ASSAY OF MAGNESIUM: CPT | Performed by: NURSE PRACTITIONER

## 2020-02-15 PROCEDURE — 80048 BASIC METABOLIC PNL TOTAL CA: CPT | Performed by: PHYSICIAN ASSISTANT

## 2020-02-15 PROCEDURE — 99024 POSTOP FOLLOW-UP VISIT: CPT | Performed by: SURGERY

## 2020-02-15 PROCEDURE — 85025 COMPLETE CBC W/AUTO DIFF WBC: CPT | Performed by: PHYSICIAN ASSISTANT

## 2020-02-15 PROCEDURE — 82948 REAGENT STRIP/BLOOD GLUCOSE: CPT

## 2020-02-15 PROCEDURE — 84100 ASSAY OF PHOSPHORUS: CPT | Performed by: NURSE PRACTITIONER

## 2020-02-15 PROCEDURE — 99233 SBSQ HOSP IP/OBS HIGH 50: CPT | Performed by: NURSE PRACTITIONER

## 2020-02-15 RX ORDER — AMLODIPINE BESYLATE 5 MG/1
10 TABLET ORAL DAILY
Status: DISCONTINUED | OUTPATIENT
Start: 2020-02-16 | End: 2020-02-20 | Stop reason: HOSPADM

## 2020-02-15 RX ADMIN — VANCOMYCIN HYDROCHLORIDE 125 MG: 500 INJECTION, POWDER, LYOPHILIZED, FOR SOLUTION INTRAVENOUS at 17:52

## 2020-02-15 RX ADMIN — PANTOPRAZOLE SODIUM 40 MG: 40 TABLET, DELAYED RELEASE ORAL at 05:55

## 2020-02-15 RX ADMIN — INSULIN LISPRO 2 UNITS: 100 INJECTION, SOLUTION INTRAVENOUS; SUBCUTANEOUS at 16:47

## 2020-02-15 RX ADMIN — SODIUM BICARBONATE 650 MG: 650 TABLET ORAL at 08:45

## 2020-02-15 RX ADMIN — COLCHICINE 0.3 MG: 0.6 TABLET, FILM COATED ORAL at 08:45

## 2020-02-15 RX ADMIN — AMLODIPINE BESYLATE 10 MG: 5 TABLET ORAL at 08:44

## 2020-02-15 RX ADMIN — POTASSIUM CHLORIDE 20 MEQ: 14.9 INJECTION, SOLUTION INTRAVENOUS at 10:37

## 2020-02-15 RX ADMIN — HEPARIN SODIUM 5000 UNITS: 5000 INJECTION INTRAVENOUS; SUBCUTANEOUS at 15:55

## 2020-02-15 RX ADMIN — INSULIN LISPRO 3 UNITS: 100 INJECTION, SOLUTION INTRAVENOUS; SUBCUTANEOUS at 21:25

## 2020-02-15 RX ADMIN — INSULIN LISPRO 1 UNITS: 100 INJECTION, SOLUTION INTRAVENOUS; SUBCUTANEOUS at 11:25

## 2020-02-15 RX ADMIN — HEPARIN SODIUM 5000 UNITS: 5000 INJECTION INTRAVENOUS; SUBCUTANEOUS at 21:24

## 2020-02-15 RX ADMIN — METRONIDAZOLE 500 MG: 500 TABLET, FILM COATED ORAL at 05:55

## 2020-02-15 RX ADMIN — SODIUM BICARBONATE 650 MG: 650 TABLET ORAL at 17:52

## 2020-02-15 RX ADMIN — Medication 1 CAPSULE: at 15:56

## 2020-02-15 RX ADMIN — INSULIN GLARGINE 15 UNITS: 100 INJECTION, SOLUTION SUBCUTANEOUS at 21:24

## 2020-02-15 RX ADMIN — TAMSULOSIN HYDROCHLORIDE 0.4 MG: 0.4 CAPSULE ORAL at 15:56

## 2020-02-15 RX ADMIN — VANCOMYCIN HYDROCHLORIDE 125 MG: 500 INJECTION, POWDER, LYOPHILIZED, FOR SOLUTION INTRAVENOUS at 11:26

## 2020-02-15 RX ADMIN — VITAMIN D, TAB 1000IU (100/BT) 1000 UNITS: 25 TAB at 08:45

## 2020-02-15 RX ADMIN — HEPARIN SODIUM 5000 UNITS: 5000 INJECTION INTRAVENOUS; SUBCUTANEOUS at 05:55

## 2020-02-15 NOTE — PROGRESS NOTES
NEPHROLOGY PROGRESS NOTE   Champ Tatum [de-identified] y o  male MRN: [id number]  Unit/Bed#: -01 Encounter: [id number]  Reason for Consult: SILVERIO (POA)  ASSESSMENT/PLAN:  SILVERIO (POA) on CKD stage IV:  Likely prerenal   Per record review, patient has recurrent incidence of acute kidney injury  Chronic disease likely secondary to diabetic nephropathy as well as neuroendocrine carcinoma  Resolved   -presented with creatinine of 2 07, peaked at 3 07   -baseline creatinine 2 1-2 4   -initially received IV fluids, then IV Lasix x2   - will continue to hold diuretics, still with diarrhea    -creatinine continues to improve and is at baseline   -continue chronic Haile catheter   -continue to avoid nephrotoxins   -last CT showed normal kidneys, negative for hydronephrosis  -I/O   -no urgent indication for renal replacement therapy  -will need follow-up at [place] office at discharge, follows with Dr Dick White, message sent to office staff  Metabolic acidosis: most recent bicarb level stable at 26    -continue sodium bicarbonate tab 650 mg b i d  -continue to monitor and adjust replacement as needed  Urinary retention with chronic indwelling urinary catheter:  -continue Flomax   -maintain Haile catheter  - for catheter exchange next week per family  Hypertension in CKD stage IV: Blood pressure overall acceptable with periods of hypertension   -continue Norvasc 10 mg daily, will place holding parameter for systolic blood pressure less than 130    -continue to avoid episodes of hypotension  Neuroendocrine carcinoma with liver metastasis:  -continue Lancreaotride injections 120 mg every 4 weeks, LD 01/14/2020   -continue outpatient follow-up with Oncology  Cholecystitis:  Status post cholecystectomy    Further care per General surgery team     Hypokalemia:  Likely secondary to increased GI losses   -last magnesium level: 2 9, status post IV Mag replacement on 2/14   -continue to monitor replace as needed  Anemia:  Status post PRBC transfusion  Hgb stable    -continue to monitor and transfuse as needed  Diarrhea:  + for C-diff  Continues to have 5+ BM's per day    -continues on Flagyl   -continue to monitor stool count  Other:  Diabetes    Disposition: requiring additional stay due to medical needs  SUBJECTIVE:  The patient is resting in bed  He appears to be comfortable  His family is at the bedside  He denies chest pain or shortness of breath  He denies nausea or vomiting  He is still having several bowel movements throughout the day  He admits to having 2 this morning already  He is eating and drinking well  He has a chronic Haile catheter  Per his family, he is due for catheter exchange next week      OBJECTIVE:  Current Weight: Weight - Scale: 75 1 kg (165 lb 9 1 oz)  Vitals:    02/14/20 2337 02/15/20 0600 02/15/20 0818 02/15/20 0844   BP: 159/75  148/75 148/75   BP Location: Left arm  Right arm    Pulse: 69  90    Resp: 18  18    Temp: (!) 96 4 °F (35 8 °C)  (!) 96 5 °F (35 8 °C)    TempSrc: Temporal  Tympanic    SpO2: 99%  99%    Weight:  75 1 kg (165 lb 9 1 oz)     Height:           Intake/Output Summary (Last 24 hours) at 2/15/2020 8450  Last data filed at 2/15/2020 0300  Gross per 24 hour   Intake 330 ml   Output 1350 ml   Net -1020 ml     General: NAD  Skin: warm, dry, intact, no rash  HEENT: Moist mucous membranes, sclera anicteric, normocephalic, atraumatic  Neck: No apparent JVD appreciated  Chest:lung sounds clear B/L, on RA   CVS:Regular rate and rhythm, no murmer   Abdomen: Soft, round, non-tender, +BS  Extremities: No B/L LE edema present  Neuro: alert and oriented  Psych: appropriate mood and affect     Medications:    Current Facility-Administered Medications:     acetaminophen (TYLENOL) tablet 650 mg, 650 mg, Oral, Q6H PRN, Loise Press, PA-C, 650 mg at 02/12/20 0931    amLODIPine (NORVASC) tablet 10 mg, 10 mg, Oral, Daily, Loise Press, PA-C, 10 mg at 02/15/20 6698    b complex-vitamin C-folic acid (NEPHROCAPS) capsule 1 capsule, 1 capsule, Oral, Daily With Dinner, Lindsay Osman PA-C, 1 capsule at 02/14/20 1711    cholecalciferol (VITAMIN D3) tablet 1,000 Units, 1,000 Units, Oral, Daily, Lindsay Osman PA-C, 1,000 Units at 02/15/20 0845    cloNIDine (CATAPRES) tablet 0 1 mg, 0 1 mg, Oral, Q3H PRN, Amanda Peguero PA-C    colchicine (COLCRYS) tablet 0 3 mg, 0 3 mg, Oral, Daily, Flowers Skylarks, CRNP, 0 3 mg at 02/15/20 0845    heparin (porcine) subcutaneous injection 5,000 Units, 5,000 Units, Subcutaneous, Q8H [address], 5,000 Units at 02/15/20 0555 **AND** [CANCELED] Platelet count, , , Once, Manuel Aleman MD    HYDROmorphone (DILAUDID) injection 0 5 mg, 0 5 mg, Intravenous, Q4H PRN, Lindsay Osman PA-C    insulin glargine (LANTUS) subcutaneous injection 15 Units 0 15 mL, 15 Units, Subcutaneous, HS, Jeanine Ac MD, 15 Units at 02/14/20 2154    insulin lispro (HumaLOG) 100 units/mL subcutaneous injection 1-5 Units, 1-5 Units, Subcutaneous, TID AC, Lindsay Osman PA-C, 2 Units at 02/14/20 1711    insulin lispro (HumaLOG) 100 units/mL subcutaneous injection 1-5 Units, 1-5 Units, Subcutaneous, HS, Lindsay Osman PA-C, 1 Units at 02/14/20 2154    metroNIDAZOLE (FLAGYL) tablet 500 mg, 500 mg, Oral, Q8H [address], Manuel Aleman MD, 500 mg at 02/15/20 0555    ondansetron (ZOFRAN) injection 4 mg, 4 mg, Intravenous, Q6H PRN, ALYHSA NugentC    oxyCODONE-acetaminophen (PERCOCET) 5-325 mg per tablet 1 tablet, 1 tablet, Oral, Q4H PRN, RL Nugent-C, 1 tablet at 02/14/20 0256    oxyCODONE-acetaminophen (PERCOCET) 5-325 mg per tablet 2 tablet, 2 tablet, Oral, Q4H PRN, RL Nugent-C, 2 tablet at 02/10/20 0345    pantoprazole (PROTONIX) EC tablet 40 mg, 40 mg, Oral, Early Morning, RL Nugent-C, 40 mg at 02/15/20 0555    potassium chloride 20 mEq IVPB (premix), 20 mEq, Intravenous, BID, RIKA Wallace, Last Rate: 50 mL/hr at 02/14/20 1947, 20 mEq at 02/14/20 1947    simethicone (MYLICON) chewable tablet 80 mg, 80 mg, Oral, Q6H PRN, Odalys Rodriguez, CRNP    sodium bicarbonate tablet 650 mg, 650 mg, Oral, BID after meals, Namrata Olson PA-C, 650 mg at 02/15/20 0845    sodium chloride (PF) 0 9 % injection 10 mL, 10 mL, Intravenous, Once per day on Mon Thu, Namrata Olson PA-C, Stopped at 02/10/20 1233    tamsulosin [facility]) capsule 0 4 mg, 0 4 mg, Oral, Daily With Ne Morris PA-C, 0 4 mg at 02/14/20 1711    Laboratory Results:  Results from last 7 days   Lab Units 02/15/20  0609 02/14/20  0531 02/13/20  1051 02/13/20  0555 02/12/20  0631 02/11/20  0456 02/11/20  0444 02/10/20  0526 02/09/20  0559   WBC Thousand/uL  --  11 70* 12 20*  --   --  17 80*  --  14 50* 12 50*   HEMOGLOBIN g/dL  --  10 5* 9 0*  --   --  8 0*  --  8 6* 8 5*   HEMATOCRIT %  --  31 8* 27 6*  --   --  24 3*  --  27 0* 26 0*   PLATELETS Thousands/uL  --  267 281  --   --  268  --  265 224   POTASSIUM mmol/L 4 5 3 5  --  3 2* 3 2*  --  3 1* 3 5 3 6   CHLORIDE mmol/L 105 107  --  107 107  --  106 105 108*   CO2 mmol/L 26 25  --  24 20*  --  21 20* 18*   BUN mg/dL 17 17  --  22 26*  --  27* 27* 29*   CREATININE mg/dL 2 25* 2 40*  --  2 67* 3 07*  --  3 05* 2 93* 2 70*   CALCIUM mg/dL 8 6 8 1*  --  8 0* 8 2*  --  8 2* 8 4* 8 2*   MAGNESIUM mg/dL 2 1 1 6*  --   --  1 9  --  1 9 1 9 1 4*   PHOSPHORUS mg/dL 3 2 3 0  --   --  3 4  --   --   --  3 8

## 2020-02-15 NOTE — PROGRESS NOTES
Progress Note - General Surgery   Radha Lugo [de-identified] y o  male MRN: [id number]  Unit/Bed#: -01 Encounter: [id number]    Assessment:  39-year-old male with history of metastatic carcinoid, now with some postop day 8 status post open cholecystectomy  Overall remains stable  Continues to have significant diarrhea secondary to positive C diff but also underlying carcinoid syndrome likely  Currently on p o  Flagyl  Patient also with acute blood loss anemia with orthostatic hypotension now status post transfusion of PRBC x1  A continues to slowly progress and and working with PT    Plan:  - diet as tolerated  - spoke to Medicine regarding C diff management and will likely stop Flagyl and switched to p o  Vanc   - patient continues to slowly progress with regards to physical therapy and the current recommendations are short-term skilled PT  Patient not wanting to go to skilled facility and still hoping that he will improve enough to return home  - with regards to a KI patient's creatinine is now 2 2 which is  his baseline, nephrology following, appreciate input  - white blood cell count 12 5 from 11, will continue antibiotics for C diff treatment  - H&H stable      Subjective/Objective   Chief Complaint:  Tired    Subjective:  Overall doing okay  Patient states he has still very tired  Still having some significant diarrhea  He is tolerating diet  Patient hoping to continue to work with PT with a goal of going home  Objective:     Blood pressure 148/75, pulse 90, temperature (!) 96 5 °F (35 8 °C), temperature source Tympanic, resp  rate 18, height 5' 8" (1 727 m), weight 75 1 kg (165 lb 9 1 oz), SpO2 99 %  ,Body mass index is 25 17 kg/m²        Intake/Output Summary (Last 24 hours) at 2/15/2020 0951  Last data filed at 2/15/2020 0300  Gross per 24 hour   Intake 330 ml   Output 1350 ml   Net -1020 ml       Invasive Devices     Peripheral Intravenous Line            Peripheral IV 02/11/20 Distal;Right;Ventral (anterior) Forearm 4 days          Drain            Urethral Catheter 18 Fr  53 days                Physical Exam:   General:  No acute distress resting comfortably  GI:  Abdomen soft, nontender nondistended, right upper quadrant subcostal incision clean dry intact with staples              Lab, Imaging and other studies:  I have personally reviewed pertinent lab results    , CBC:   Lab Results   Component Value Date    WBC 12 50 (H) 02/15/2020    HGB 11 9 (L) 02/15/2020    HCT 35 5 (L) 02/15/2020    MCV 91 02/15/2020     02/15/2020    MCH 30 4 02/15/2020    MCHC 33 5 02/15/2020    RDW 15 4 (H) 02/15/2020    MPV 7 6 (L) 02/15/2020   , CMP:   Lab Results   Component Value Date    SODIUM 141 02/15/2020    K 4 5 02/15/2020     02/15/2020    CO2 26 02/15/2020    BUN 17 02/15/2020    CREATININE 2 25 (H) 02/15/2020    CALCIUM 8 6 02/15/2020    EGFR 27 02/15/2020   , Coagulation: No results found for: PT, INR, APTT, Urinalysis: No results found for: COLORU, CLARITYU, SPECGRAV, PHUR, LEUKOCYTESUR, NITRITE, PROTEINUA, GLUCOSEU, KETONESU, BILIRUBINUR, BLOODU, Amylase: No results found for: AMYLASE, Lipase: No results found for: LIPASE  VTE Pharmacologic Prophylaxis: Heparin  VTE Mechanical Prophylaxis: sequential compression device

## 2020-02-16 LAB
ANION GAP SERPL CALCULATED.3IONS-SCNC: 7 MMOL/L (ref 4–13)
BUN SERPL-MCNC: 17 MG/DL (ref 7–25)
CALCIUM SERPL-MCNC: 8.2 MG/DL (ref 8.6–10.5)
CHLORIDE SERPL-SCNC: 106 MMOL/L (ref 98–107)
CO2 SERPL-SCNC: 25 MMOL/L (ref 21–31)
CREAT SERPL-MCNC: 2.19 MG/DL (ref 0.7–1.3)
GFR SERPL CREATININE-BSD FRML MDRD: 27 ML/MIN/1.73SQ M
GLUCOSE SERPL-MCNC: 123 MG/DL (ref 65–140)
GLUCOSE SERPL-MCNC: 211 MG/DL (ref 65–140)
GLUCOSE SERPL-MCNC: 261 MG/DL (ref 65–140)
GLUCOSE SERPL-MCNC: 267 MG/DL (ref 65–140)
GLUCOSE SERPL-MCNC: 57 MG/DL (ref 65–140)
GLUCOSE SERPL-MCNC: 59 MG/DL (ref 65–99)
POTASSIUM SERPL-SCNC: 3.5 MMOL/L (ref 3.5–5.5)
SODIUM SERPL-SCNC: 138 MMOL/L (ref 134–143)

## 2020-02-16 PROCEDURE — 80048 BASIC METABOLIC PNL TOTAL CA: CPT | Performed by: NURSE PRACTITIONER

## 2020-02-16 PROCEDURE — 99233 SBSQ HOSP IP/OBS HIGH 50: CPT | Performed by: NURSE PRACTITIONER

## 2020-02-16 PROCEDURE — 99024 POSTOP FOLLOW-UP VISIT: CPT | Performed by: SURGERY

## 2020-02-16 PROCEDURE — 82948 REAGENT STRIP/BLOOD GLUCOSE: CPT

## 2020-02-16 RX ORDER — POTASSIUM CHLORIDE 20 MEQ/1
40 TABLET, EXTENDED RELEASE ORAL ONCE
Status: COMPLETED | OUTPATIENT
Start: 2020-02-16 | End: 2020-02-16

## 2020-02-16 RX ADMIN — HEPARIN SODIUM 5000 UNITS: 5000 INJECTION INTRAVENOUS; SUBCUTANEOUS at 23:10

## 2020-02-16 RX ADMIN — TAMSULOSIN HYDROCHLORIDE 0.4 MG: 0.4 CAPSULE ORAL at 16:36

## 2020-02-16 RX ADMIN — VANCOMYCIN HYDROCHLORIDE 125 MG: 500 INJECTION, POWDER, LYOPHILIZED, FOR SOLUTION INTRAVENOUS at 17:49

## 2020-02-16 RX ADMIN — INSULIN GLARGINE 15 UNITS: 100 INJECTION, SOLUTION SUBCUTANEOUS at 23:11

## 2020-02-16 RX ADMIN — AMLODIPINE BESYLATE 10 MG: 5 TABLET ORAL at 09:43

## 2020-02-16 RX ADMIN — VITAMIN D, TAB 1000IU (100/BT) 1000 UNITS: 25 TAB at 09:43

## 2020-02-16 RX ADMIN — INSULIN LISPRO 2 UNITS: 100 INJECTION, SOLUTION INTRAVENOUS; SUBCUTANEOUS at 11:25

## 2020-02-16 RX ADMIN — HEPARIN SODIUM 5000 UNITS: 5000 INJECTION INTRAVENOUS; SUBCUTANEOUS at 06:29

## 2020-02-16 RX ADMIN — Medication 1 CAPSULE: at 16:36

## 2020-02-16 RX ADMIN — POTASSIUM CHLORIDE 40 MEQ: 1500 TABLET, EXTENDED RELEASE ORAL at 12:02

## 2020-02-16 RX ADMIN — VANCOMYCIN HYDROCHLORIDE 125 MG: 500 INJECTION, POWDER, LYOPHILIZED, FOR SOLUTION INTRAVENOUS at 23:11

## 2020-02-16 RX ADMIN — VANCOMYCIN HYDROCHLORIDE 125 MG: 500 INJECTION, POWDER, LYOPHILIZED, FOR SOLUTION INTRAVENOUS at 12:04

## 2020-02-16 RX ADMIN — VANCOMYCIN HYDROCHLORIDE 125 MG: 500 INJECTION, POWDER, LYOPHILIZED, FOR SOLUTION INTRAVENOUS at 06:29

## 2020-02-16 RX ADMIN — VANCOMYCIN HYDROCHLORIDE 125 MG: 500 INJECTION, POWDER, LYOPHILIZED, FOR SOLUTION INTRAVENOUS at 00:00

## 2020-02-16 RX ADMIN — SODIUM BICARBONATE 650 MG: 650 TABLET ORAL at 16:36

## 2020-02-16 RX ADMIN — SODIUM BICARBONATE 650 MG: 650 TABLET ORAL at 09:43

## 2020-02-16 RX ADMIN — INSULIN LISPRO 2 UNITS: 100 INJECTION, SOLUTION INTRAVENOUS; SUBCUTANEOUS at 16:37

## 2020-02-16 RX ADMIN — COLCHICINE 0.3 MG: 0.6 TABLET, FILM COATED ORAL at 09:43

## 2020-02-16 RX ADMIN — INSULIN LISPRO 2 UNITS: 100 INJECTION, SOLUTION INTRAVENOUS; SUBCUTANEOUS at 23:10

## 2020-02-16 RX ADMIN — PANTOPRAZOLE SODIUM 40 MG: 40 TABLET, DELAYED RELEASE ORAL at 06:29

## 2020-02-16 RX ADMIN — OXYCODONE HYDROCHLORIDE AND ACETAMINOPHEN 2 TABLET: 5; 325 TABLET ORAL at 11:24

## 2020-02-16 NOTE — PLAN OF CARE
Problem: Potential for Falls  Goal: Patient will remain free of falls  Description  INTERVENTIONS:  - Assess patient frequently for physical needs  -  Identify cognitive and physical deficits and behaviors that affect risk of falls    -  [place] fall precautions as indicated by assessment   - Educate patient/family on patient safety including physical limitations  - Instruct patient to call for assistance with activity based on assessment  - Modify environment to reduce risk of injury  - Consider OT/PT consult to assist with strengthening/mobility  Outcome: Progressing     Problem: PAIN - ADULT  Goal: Verbalizes/displays adequate comfort level or baseline comfort level  Description  Interventions:  - Encourage patient to monitor pain and request assistance  - Assess pain using appropriate pain scale  - Administer analgesics based on type and severity of pain and evaluate response  - Implement non-pharmacological measures as appropriate and evaluate response  - Consider cultural and social influences on pain and pain management  - Notify physician/advanced practitioner if interventions unsuccessful or patient reports new pain  Outcome: Progressing     Problem: DISCHARGE PLANNING  Goal: Discharge to home or other facility with appropriate resources  Description  INTERVENTIONS:  - Identify barriers to discharge w/patient and caregiver  - Arrange for needed discharge resources and transportation as appropriate  - Identify discharge learning needs (meds, wound care, etc )  - Arrange for interpretive services to assist at discharge as needed  - Refer to Case Management Department for coordinating discharge planning if the patient needs post-hospital services based on physician/advanced practitioner order or complex needs related to functional status, cognitive ability, or social support system  Outcome: Progressing

## 2020-02-16 NOTE — NURSING NOTE
Pt resting in bed, complains of moderate pain to surgical site, which is CDI TIFFANIE staples, no s/sx infection  Analgesia administered as ordered  Receiving Vanco PO for Cdiff, remains on contact precautions, significant other given education RT contact garb however refuses to follow strict precautions after verbalizing understanding and states, "EH its ok"  Pt aware to perform hand hygiene before and after visiting  Chronic garcia patent and draining cloudy yellow urine with sediment  Pt incontinent x2 loose BM however more formed than on 2/15  Blood glucose 57 this AM pt asymptomatic, ate breakfast and BG went to 123  Calazime applied to bilat buttocks excoriation and encouraged to turn and reposition  Personal needs and call bell within reach, will continue to monitor

## 2020-02-16 NOTE — PROGRESS NOTES
NEPHROLOGY PROGRESS NOTE   Danny Thomas [de-identified] y o  male MRN: [id number]  Unit/Bed#: -01 Encounter: [id number]  Reason for Consult: SILVERIO (POA) on CKD IV    ASSESSMENT/PLAN:  SILVERIO (POA) on CKD stage IV:  Likely prerenal   Per record review, patient has recurrent incidence of acute kidney injury  Chronic disease likely secondary to diabetic nephropathy as well as neuroendocrine carcinoma  Resolved   -presented with creatinine of 2 07, peaked at 3 07   -baseline creatinine 2 1-2 4   -initially received IV fluids, then IV Lasix x2   - will continue to hold diuretics, diarrhea improving  Likely re-initiate in next 24-48 hours  -continue chronic Haile catheter   -continue to avoid nephrotoxins   -last CT showed normal kidneys, negative for hydronephrosis  -I/O   -no urgent indication for renal replacement therapy  -will need follow-up at [facility] office at discharge, follows with Dr Ron Padilla, message sent to office staff       Metabolic acidosis: stable    -continue sodium bicarbonate tab 650 mg b i d  -continue to monitor and adjust replacement as needed      Urinary retention with chronic indwelling urinary catheter:  -continue Flomax   -maintain Haile catheter  - for catheter exchange this week per family       Hypertension in CKD stage IV: Blood pressure overall acceptable    -continue Norvasc 10 mg daily, will place holding parameter for systolic blood pressure less than 130    -continue to avoid episodes of hypotension   - goal -150 mmHg       Neuroendocrine carcinoma with liver metastasis:  -continue Lancreaotride injections 120 mg every 4 weeks, LD 01/14/2020   -continue outpatient follow-up with Oncology      Cholecystitis:  Status post cholecystectomy  Further care per General surgery team      Hypokalemia:  Likely secondary to increased GI losses   Stable    -last magnesium level: 2 9, status post IV Mag replacement on 2/14   -continue to monitor replace as needed      Anemia:  Status post PRBC transfusion  Hgb stable    -continue to monitor and transfuse as needed      Diarrhea:  + for C-diff  Continues to have 5+ BM's per day    -switched from flagyl to PO Vanco    -continue to monitor stool count      Other:  Diabetes     Disposition: requiring additional stay due to medical needs  SUBJECTIVE:  The patient is resting in his bed  He denies chest pain or shortness of breath  He states that he feels tired  He denies nausea, vomiting, diarrhea  He has a chronic Haile catheter with cloudy yellow urine  He states he has a good appetite      OBJECTIVE:  Current Weight: Weight - Scale: 72 kg (158 lb 11 7 oz)  Vitals:    02/15/20 1559 02/15/20 2345 02/16/20 0600 02/16/20 0701   BP: 150/71 145/73  156/82   BP Location: Left arm Left arm  Left arm   Pulse: 77 69  79   Resp: 16 16  16   Temp: 97 5 °F (36 4 °C) (!) 97 2 °F (36 2 °C)  97 6 °F (36 4 °C)   TempSrc: Temporal Temporal  Temporal   SpO2: 98% 98%  99%   Weight:   72 kg (158 lb 11 7 oz)    Height:           Intake/Output Summary (Last 24 hours) at 2/16/2020 0947  Last data filed at 2/15/2020 1601  Gross per 24 hour   Intake 360 ml   Output 900 ml   Net -540 ml     General: NAD  Skin: warm, dry, intact, no rash  HEENT: Moist mucous membranes, sclera anicteric, normocephalic, atraumatic  Neck: No apparent JVD appreciated  Chest:lung sounds clear B/L, on RA   CVS:Regular rate and rhythm, no murmer   Abdomen: Soft, round, non-tender, +BS  Extremities: No B/L LE edema present  Neuro: alert and oriented  Psych: appropriate mood and affect     Medications:    Current Facility-Administered Medications:     acetaminophen (TYLENOL) tablet 650 mg, 650 mg, Oral, Q6H PRN, Travis Marroquin PA-C, 650 mg at 02/12/20 0931    amLODIPine (NORVASC) tablet 10 mg, 10 mg, Oral, Daily, Merilee Smart, CRNP, 10 mg at 02/16/20 6787    b complex-vitamin C-folic acid (NEPHROCAPS) capsule 1 capsule, 1 capsule, Oral, Daily With Dinner, Travis Marroquin PA-C, 1 capsule at 02/15/20 1556    cholecalciferol (VITAMIN D3) tablet 1,000 Units, 1,000 Units, Oral, Daily, Jhony Navarro PA-C, 1,000 Units at 02/16/20 0943    cloNIDine (CATAPRES) tablet 0 1 mg, 0 1 mg, Oral, Q3H PRN, Larissa Rosario PA-C    colchicine (COLCRYS) tablet 0 3 mg, 0 3 mg, Oral, Daily, Mikel Correa, RIKA, 0 3 mg at 02/16/20 0943    heparin (porcine) subcutaneous injection 5,000 Units, 5,000 Units, Subcutaneous, Q8H [address], 5,000 Units at 02/16/20 0629 **AND** [CANCELED] Platelet count, , , Once, Rafia Lopez MD    HYDROmorphone (DILAUDID) injection 0 5 mg, 0 5 mg, Intravenous, Q4H PRN, Jhony Navarro PA-C    insulin glargine (LANTUS) subcutaneous injection 15 Units 0 15 mL, 15 Units, Subcutaneous, HS, Baltazar Bain MD, 15 Units at 02/15/20 2124    insulin lispro (HumaLOG) 100 units/mL subcutaneous injection 1-5 Units, 1-5 Units, Subcutaneous, TID AC, Jhony Navarro PA-C, 2 Units at 02/15/20 1647    insulin lispro (HumaLOG) 100 units/mL subcutaneous injection 1-5 Units, 1-5 Units, Subcutaneous, HS, Jhony Navarro PA-C, 3 Units at 02/15/20 2125    ondansetron (ZOFRAN) injection 4 mg, 4 mg, Intravenous, Q6H PRN, Jhony Navarro PA-C    oxyCODONE-acetaminophen (PERCOCET) 5-325 mg per tablet 1 tablet, 1 tablet, Oral, Q4H PRN, Jhony Navarro PA-C, 1 tablet at 02/14/20 0256    oxyCODONE-acetaminophen (PERCOCET) 5-325 mg per tablet 2 tablet, 2 tablet, Oral, Q4H PRN, Jhony Navarro PA-C, 2 tablet at 02/10/20 0345    pantoprazole (PROTONIX) EC tablet 40 mg, 40 mg, Oral, Early Morning, Jhony Navarro PA-C, 40 mg at 02/16/20 1495    simethicone (MYLICON) chewable tablet 80 mg, 80 mg, Oral, Q6H PRN, RIKA Padilla    sodium bicarbonate tablet 650 mg, 650 mg, Oral, BID after meals, Jhony Navarro PA-C, 650 mg at 02/16/20 0943    sodium chloride (PF) 0 9 % injection 10 mL, 10 mL, Intravenous, Once per day on Mon Thu, Libra Valadez PA-C, Stopped at 02/10/20 1233    tamsulosin (FLOMAX) capsule 0 4 mg, 0 4 mg, Oral, Daily With Mary Walton PA-C, 0 4 mg at 02/15/20 1556    vancomycin [facility]) oral solution 125 mg, 125 mg, Oral, Q6H [address], Esperanzadipti Gooden DO, 125 mg at 02/16/20 5222    Laboratory Results:  Results from last 7 days   Lab Units 02/16/20  0628 02/15/20  0917 02/15/20  0609 02/14/20  0531 02/13/20  1051 02/13/20  0555 02/12/20  0631 02/11/20  0456 02/11/20  0444 02/10/20  0526   WBC Thousand/uL  --  12 50*  --  11 70* 12 20*  --   --  17 80*  --  14 50*   HEMOGLOBIN g/dL  --  11 9*  --  10 5* 9 0*  --   --  8 0*  --  8 6*   HEMATOCRIT %  --  35 5*  --  31 8* 27 6*  --   --  24 3*  --  27 0*   PLATELETS Thousands/uL  --  281  --  267 281  --   --  268  --  265   POTASSIUM mmol/L 3 5  --  4 5 3 5  --  3 2* 3 2*  --  3 1* 3 5   CHLORIDE mmol/L 106  --  105 107  --  107 107  --  106 105   CO2 mmol/L 25  --  26 25  --  24 20*  --  21 20*   BUN mg/dL 17  --  17 17  --  22 26*  --  27* 27*   CREATININE mg/dL 2 19*  --  2 25* 2 40*  --  2 67* 3 07*  --  3 05* 2 93*   CALCIUM mg/dL 8 2*  --  8 6 8 1*  --  8 0* 8 2*  --  8 2* 8 4*   MAGNESIUM mg/dL  --   --  2 1 1 6*  --   --  1 9  --  1 9 1 9   PHOSPHORUS mg/dL  --   --  3 2 3 0  --   --  3 4  --   --   --

## 2020-02-16 NOTE — PROGRESS NOTES
Progress Note - General Surgery   Analisa Davidson [de-identified] y o  male MRN: [id number]  Unit/Bed#: -01 Encounter: [id number]    Assessment:  79-year-old male with history of metastatic carcinoid, now with some postop day 8 status post open cholecystectomy  Overall remains stable  Continues to have significant diarrhea secondary to positive C diff but also underlying carcinoid syndrome likely  Switched to p o  Vancomycin yesterday  Bowel movements are more formed today  A continues to slowly progress and and working with PT  He states he is now considering short-term skilled facility  Plan:  - diet as tolerated  - continue p o  Vanc   - patient continues to slowly progress with regards to physical therapy and the current recommendations are short-term skilled PT  Patient now considering being discharged to skilled facility  - current continues to improve, currently 2 19, which is  his baseline, nephrology following, appreciate input  - replace potassium      Subjective/Objective   Chief Complaint:  Tired    Subjective:  Overall doing okay  Patient states he has still very tired  States that his bowel movements were becoming more formed as of this morning  [employer] He is tolerating diet  Objective:     Blood pressure 156/82, pulse 79, temperature 97 6 °F (36 4 °C), temperature source Temporal, resp  rate 16, height 5' 8" (1 727 m), weight 72 kg (158 lb 11 7 oz), SpO2 99 %  ,Body mass index is 24 14 kg/m²  Intake/Output Summary (Last 24 hours) at 2/16/2020 1105  Last data filed at 2/15/2020 1601  Gross per 24 hour   Intake 360 ml   Output 900 ml   Net -540 ml       Invasive Devices     Peripheral Intravenous Line            Peripheral IV 02/16/20 Left;Ventral (anterior) Wrist less than 1 day          Drain            Urethral Catheter 18 Fr   54 days                Physical Exam:   General:  No acute distress resting comfortably  GI:  Abdomen soft, nontender nondistended, right upper quadrant subcostal incision clean dry intact with staples              Lab, Imaging and other studies:  I have personally reviewed pertinent lab results    , CBC:   No results found for: WBC, HGB, HCT, MCV, PLT, ADJUSTEDWBC, MCH, MCHC, RDW, MPV, NRBC, CMP:   Lab Results   Component Value Date    SODIUM 138 02/16/2020    K 3 5 02/16/2020     02/16/2020    CO2 25 02/16/2020    BUN 17 02/16/2020    CREATININE 2 19 (H) 02/16/2020    CALCIUM 8 2 (L) 02/16/2020    EGFR 27 02/16/2020   , Coagulation: No results found for: PT, INR, APTT, Urinalysis: No results found for: COLORU, CLARITYU, SPECGRAV, PHUR, LEUKOCYTESUR, NITRITE, PROTEINUA, GLUCOSEU, KETONESU, BILIRUBINUR, BLOODU, Amylase: No results found for: AMYLASE, Lipase: No results found for: LIPASE  VTE Pharmacologic Prophylaxis: Heparin  VTE Mechanical Prophylaxis: sequential compression device

## 2020-02-17 LAB
ABO GROUP BLD BPU: NORMAL
ABO GROUP BLD BPU: NORMAL
ANION GAP SERPL CALCULATED.3IONS-SCNC: 9 MMOL/L (ref 4–13)
BASOPHILS # BLD AUTO: 0.1 THOUSANDS/ΜL (ref 0–0.1)
BASOPHILS NFR BLD AUTO: 1 % (ref 0–2)
BPU ID: NORMAL
BPU ID: NORMAL
BUN SERPL-MCNC: 17 MG/DL (ref 7–25)
CALCIUM SERPL-MCNC: 8.1 MG/DL (ref 8.6–10.5)
CHLORIDE SERPL-SCNC: 107 MMOL/L (ref 98–107)
CO2 SERPL-SCNC: 25 MMOL/L (ref 21–31)
CREAT SERPL-MCNC: 2.31 MG/DL (ref 0.7–1.3)
CROSSMATCH: NORMAL
CROSSMATCH: NORMAL
EOSINOPHIL # BLD AUTO: 0.2 THOUSAND/ΜL (ref 0–0.61)
EOSINOPHIL NFR BLD AUTO: 1 % (ref 0–5)
ERYTHROCYTE [DISTWIDTH] IN BLOOD BY AUTOMATED COUNT: 15.8 % (ref 11.5–14.5)
GFR SERPL CREATININE-BSD FRML MDRD: 26 ML/MIN/1.73SQ M
GLUCOSE SERPL-MCNC: 129 MG/DL (ref 65–99)
GLUCOSE SERPL-MCNC: 151 MG/DL (ref 65–140)
GLUCOSE SERPL-MCNC: 237 MG/DL (ref 65–140)
GLUCOSE SERPL-MCNC: 237 MG/DL (ref 65–140)
GLUCOSE SERPL-MCNC: 304 MG/DL (ref 65–140)
HCT VFR BLD AUTO: 31.8 % (ref 42–47)
HGB BLD-MCNC: 10.4 G/DL (ref 14–18)
LYMPHOCYTES # BLD AUTO: 1.8 THOUSANDS/ΜL (ref 0.6–4.47)
LYMPHOCYTES NFR BLD AUTO: 15 % (ref 21–51)
MCH RBC QN AUTO: 30.1 PG (ref 26–34)
MCHC RBC AUTO-ENTMCNC: 32.8 G/DL (ref 31–37)
MCV RBC AUTO: 92 FL (ref 81–99)
MONOCYTES # BLD AUTO: 0.9 THOUSAND/ΜL (ref 0.17–1.22)
MONOCYTES NFR BLD AUTO: 7 % (ref 2–12)
NEUTROPHILS # BLD AUTO: 9.1 THOUSANDS/ΜL (ref 1.4–6.5)
NEUTS SEG NFR BLD AUTO: 76 % (ref 42–75)
PLATELET # BLD AUTO: 225 THOUSANDS/UL (ref 149–390)
PMV BLD AUTO: 7.9 FL (ref 8.6–11.7)
POTASSIUM SERPL-SCNC: 4 MMOL/L (ref 3.5–5.5)
RBC # BLD AUTO: 3.47 MILLION/UL (ref 4.3–5.9)
SODIUM SERPL-SCNC: 141 MMOL/L (ref 134–143)
UNIT DISPENSE STATUS: NORMAL
UNIT DISPENSE STATUS: NORMAL
UNIT PRODUCT CODE: NORMAL
UNIT PRODUCT CODE: NORMAL
UNIT RH: NORMAL
UNIT RH: NORMAL
WBC # BLD AUTO: 12 THOUSAND/UL (ref 4.8–10.8)

## 2020-02-17 PROCEDURE — 80048 BASIC METABOLIC PNL TOTAL CA: CPT | Performed by: SURGERY

## 2020-02-17 PROCEDURE — 85025 COMPLETE CBC W/AUTO DIFF WBC: CPT | Performed by: SURGERY

## 2020-02-17 PROCEDURE — 82948 REAGENT STRIP/BLOOD GLUCOSE: CPT

## 2020-02-17 PROCEDURE — 99024 POSTOP FOLLOW-UP VISIT: CPT | Performed by: SPECIALIST

## 2020-02-17 PROCEDURE — 99232 SBSQ HOSP IP/OBS MODERATE 35: CPT | Performed by: INTERNAL MEDICINE

## 2020-02-17 RX ORDER — HYDRALAZINE HYDROCHLORIDE 25 MG/1
25 TABLET, FILM COATED ORAL EVERY 8 HOURS SCHEDULED
Status: DISCONTINUED | OUTPATIENT
Start: 2020-02-17 | End: 2020-02-18

## 2020-02-17 RX ADMIN — PANTOPRAZOLE SODIUM 40 MG: 40 TABLET, DELAYED RELEASE ORAL at 06:07

## 2020-02-17 RX ADMIN — VANCOMYCIN HYDROCHLORIDE 125 MG: 500 INJECTION, POWDER, LYOPHILIZED, FOR SOLUTION INTRAVENOUS at 17:50

## 2020-02-17 RX ADMIN — HEPARIN SODIUM 5000 UNITS: 5000 INJECTION INTRAVENOUS; SUBCUTANEOUS at 14:02

## 2020-02-17 RX ADMIN — Medication 1 CAPSULE: at 16:19

## 2020-02-17 RX ADMIN — VANCOMYCIN HYDROCHLORIDE 125 MG: 500 INJECTION, POWDER, LYOPHILIZED, FOR SOLUTION INTRAVENOUS at 12:49

## 2020-02-17 RX ADMIN — INSULIN LISPRO 1 UNITS: 100 INJECTION, SOLUTION INTRAVENOUS; SUBCUTANEOUS at 08:43

## 2020-02-17 RX ADMIN — HEPARIN SODIUM 5000 UNITS: 5000 INJECTION INTRAVENOUS; SUBCUTANEOUS at 22:46

## 2020-02-17 RX ADMIN — HYDRALAZINE HYDROCHLORIDE 25 MG: 25 TABLET, FILM COATED ORAL at 22:47

## 2020-02-17 RX ADMIN — INSULIN LISPRO 2 UNITS: 100 INJECTION, SOLUTION INTRAVENOUS; SUBCUTANEOUS at 22:47

## 2020-02-17 RX ADMIN — INSULIN LISPRO 4 UNITS: 100 INJECTION, SOLUTION INTRAVENOUS; SUBCUTANEOUS at 16:57

## 2020-02-17 RX ADMIN — COLCHICINE 0.3 MG: 0.6 TABLET, FILM COATED ORAL at 08:45

## 2020-02-17 RX ADMIN — AMLODIPINE BESYLATE 10 MG: 5 TABLET ORAL at 08:44

## 2020-02-17 RX ADMIN — HYDRALAZINE HYDROCHLORIDE 25 MG: 25 TABLET, FILM COATED ORAL at 14:02

## 2020-02-17 RX ADMIN — INSULIN GLARGINE 15 UNITS: 100 INJECTION, SOLUTION SUBCUTANEOUS at 22:54

## 2020-02-17 RX ADMIN — INSULIN LISPRO 3 UNITS: 100 INJECTION, SOLUTION INTRAVENOUS; SUBCUTANEOUS at 12:49

## 2020-02-17 RX ADMIN — HEPARIN SODIUM 5000 UNITS: 5000 INJECTION INTRAVENOUS; SUBCUTANEOUS at 06:07

## 2020-02-17 RX ADMIN — VITAMIN D, TAB 1000IU (100/BT) 1000 UNITS: 25 TAB at 08:44

## 2020-02-17 RX ADMIN — SODIUM BICARBONATE 650 MG: 650 TABLET ORAL at 08:44

## 2020-02-17 RX ADMIN — TAMSULOSIN HYDROCHLORIDE 0.4 MG: 0.4 CAPSULE ORAL at 16:20

## 2020-02-17 RX ADMIN — VANCOMYCIN HYDROCHLORIDE 125 MG: 500 INJECTION, POWDER, LYOPHILIZED, FOR SOLUTION INTRAVENOUS at 06:07

## 2020-02-17 NOTE — UTILIZATION REVIEW
Continued Stay Review    Date: 2/17/20                       Current Patient Class: inpatient  Current Level of Care: med surg  HPI:79 y o  male initially admitted on 2/5/20  Assessment/Plan:   POD10 open cholecystectomy for recurrent cholecystitis and cholecystostomy tube blockage  Reports feeling "short winded" with exertion, but tolerating room air at rest  No chest pain  No nausea/vomiting  Having loose stools  4x yesterday and 2x this morning  Associated fecal incontinence due to urgency/liquid consistency  Has Haile catheter  Shallow respirations, poor inspiratory effort  Abd Round, soft, non-tender  No gaseous distention  Port sites C/D/I  Right subcostal incision C/D/I with staples  No guarding or rigidity     Pertinent Labs/Diagnostic Results:   Results from last 7 days   Lab Units 02/17/20  0506 02/15/20  0917 02/14/20  0531 02/13/20  1051 02/11/20  0456   WBC Thousand/uL 12 00* 12 50* 11 70* 12 20* 17 80*   HEMOGLOBIN g/dL 10 4* 11 9* 10 5* 9 0* 8 0*   HEMATOCRIT % 31 8* 35 5* 31 8* 27 6* 24 3*   PLATELETS Thousands/uL 225 281 267 281 268   NEUTROS ABS Thousands/µL 9 10* 9 90*  --  10 30* 15 10*     Results from last 7 days   Lab Units 02/17/20  0506 02/16/20  0628 02/15/20  0609 02/14/20  0531 02/13/20  0555 02/12/20  0631 02/11/20  0444   SODIUM mmol/L 141 138 141 142 140 141 139   POTASSIUM mmol/L 4 0 3 5 4 5 3 5 3 2* 3 2* 3 1*   CHLORIDE mmol/L 107 106 105 107 107 107 106   CO2 mmol/L 25 25 26 25 24 20* 21   ANION GAP mmol/L 9 7 10 10 9 14* 12   BUN mg/dL 17 17 17 17 22 26* 27*   CREATININE mg/dL 2 31* 2 19* 2 25* 2 40* 2 67* 3 07* 3 05*   EGFR ml/min/1 73sq m 26 27 27 25 22 18 18   CALCIUM mg/dL 8 1* 8 2* 8 6 8 1* 8 0* 8 2* 8 2*   MAGNESIUM mg/dL  --   --  2 1 1 6*  --  1 9 1 9   PHOSPHORUS mg/dL  --   --  3 2 3 0  --  3 4  --      Results from last 7 days   Lab Units 02/12/20  0631 02/11/20  0444   AST U/L 12* 13   ALT U/L 9 9   ALK PHOS U/L 106 92   TOTAL PROTEIN g/dL 5 2* 5 3*   ALBUMIN g/dL 2 6* 2 6*   TOTAL BILIRUBIN mg/dL 0 40 0 40     Results from last 7 days   Lab Units 02/17/20  1224 02/17/20  0636 02/16/20  2045 02/16/20  1629 02/16/20  1114 02/16/20  0752 02/16/20  0635 02/15/20  2121 02/15/20  1626 02/15/20  1059 02/15/20  0703 02/14/20  2032   POC GLUCOSE mg/dl 304* 151* 261* 267* 211* 123 57* 317* 234* 203* 82 199*     Results from last 7 days   Lab Units 02/17/20  0506 02/16/20  0628 02/15/20  0609 02/14/20  0531 02/13/20  0555 02/12/20  0631 02/11/20  0444   GLUCOSE RANDOM mg/dL 129* 59* 73 73 116* 169* 139*     BETA-HYDROXYBUTYRATE   Date Value Ref Range Status   02/05/2019 0 11 0 02 - 0 27 mmol/L Final      Results from last 7 days   Lab Units 02/14/20  0531   FERRITIN ng/mL 646*     Results from last 7 days   Lab Units 02/11/20  1536   C DIFF TOXIN B  Positive*     Vital Signs: /72   Pulse 76   Temp (!) 97 4 °F (36 3 °C) (Tympanic)   Resp 18   Ht 5' 8" (1 727 m)   Wt 71 3 kg (157 lb 3 oz)   SpO2 98%   BMI 23 90 kg/m²   Medications:   amLODIPine 10 mg Oral Daily   b complex-vitamin C-folic acid 1 capsule Oral Daily With Dinner   cholecalciferol 1,000 Units Oral Daily   colchicine 0 3 mg Oral Daily   heparin (porcine) 5,000 Units Subcutaneous Q8H NATACHA   hydrALAZINE 25 mg Oral Q8H NATACHA   insulin glargine 15 Units Subcutaneous HS   insulin lispro 1-5 Units Subcutaneous TID AC   insulin lispro 1-5 Units Subcutaneous HS   pantoprazole 40 mg Oral Early Morning   sodium chloride (PF) 10 mL Intravenous Once per day on Mon Thu   tamsulosin 0 4 mg Oral Daily With Dinner   vancomycin 125 mg Oral Q6H [address]     PRN Meds:  acetaminophen 650 mg Oral Q6H PRN   cloNIDine 0 1 mg Oral Q3H PRN   HYDROmorphone 0 5 mg Intravenous Q4H PRN   ondansetron 4 mg Intravenous Q6H PRN   oxyCODONE-acetaminophen 1 tablet Oral Q4H PRN   oxyCODONE-acetaminophen 2 tablet Oral Q4H PRN   simethicone 80 mg Oral Q6H PRN     Discharge Plan: [email or web address]  Network Utilization Review Department  [email or web address]  ATTENTION: Please call with any questions or concerns to [phone] and carefully listen to the prompts so that you are directed to the right person  All voicemails are confidential   Felix Euceda all requests for admission clinical reviews, approved or denied determinations and any other requests to dedicated fax number below belonging to the campus where the patient is receiving treatment   List of dedicated fax numbers for the Facilities:  [address] [place] [address] DENIALS (Administrative/Medical Necessity) [phone]   [address] (Maternity/NICU/Pediatrics) [phone]   Deneise Notice [phone]   Mary Walton [phone]   Costa Perdomo [phone]   Lyla Arroyo [phone]   [address] [place] [address] [phone]   [facility]  [phone]   [address] [facility]  [address] [facility] [address] [facility] [phone]

## 2020-02-17 NOTE — PLAN OF CARE
Problem: Potential for Falls  Goal: Patient will remain free of falls  Description  INTERVENTIONS:  - Assess patient frequently for physical needs  -  Identify cognitive and physical deficits and behaviors that affect risk of falls    -  [place] fall precautions as indicated by assessment   - Educate patient/family on patient safety including physical limitations  - Instruct patient to call for assistance with activity based on assessment  - Modify environment to reduce risk of injury  - Consider OT/PT consult to assist with strengthening/mobility  Outcome: Progressing     Problem: PAIN - ADULT  Goal: Verbalizes/displays adequate comfort level or baseline comfort level  Description  Interventions:  - Encourage patient to monitor pain and request assistance  - Assess pain using appropriate pain scale  - Administer analgesics based on type and severity of pain and evaluate response  - Implement non-pharmacological measures as appropriate and evaluate response  - Consider cultural and social influences on pain and pain management  - Notify physician/advanced practitioner if interventions unsuccessful or patient reports new pain  Outcome: Progressing     Problem: INFECTION - ADULT  Goal: Absence or prevention of progression during hospitalization  Description  INTERVENTIONS:  - Assess and monitor for signs and symptoms of infection  - Monitor lab/diagnostic results  - Monitor all insertion sites, i e  indwelling lines, tubes, and drains  - Monitor endotracheal if appropriate and nasal secretions for changes in amount and color  - Staples appropriate cooling/warming therapies per order  - Administer medications as ordered  - Instruct and encourage patient and family to use good hand hygiene technique  - Identify and instruct in appropriate isolation precautions for identified infection/condition  Outcome: Progressing  Goal: Absence of fever/infection during neutropenic period  Description  INTERVENTIONS:  - Monitor WBC    Outcome: Progressing     Problem: SAFETY ADULT  Goal: Patient will remain free of falls  Description  INTERVENTIONS:  - Assess patient frequently for physical needs  -  Identify cognitive and physical deficits and behaviors that affect risk of falls    -  [place] fall precautions as indicated by assessment   - Educate patient/family on patient safety including physical limitations  - Instruct patient to call for assistance with activity based on assessment  - Modify environment to reduce risk of injury  - Consider OT/PT consult to assist with strengthening/mobility  Outcome: Progressing  Goal: Maintain or return to baseline ADL function  Description  INTERVENTIONS:  -  Assess patient's ability to carry out ADLs; assess patient's baseline for ADL function and identify physical deficits which impact ability to perform ADLs (bathing, care of mouth/teeth, toileting, grooming, dressing, etc )  - Assess/evaluate cause of self-care deficits   - Assess range of motion  - Assess patient's mobility; develop plan if impaired  - Assess patient's need for assistive devices and provide as appropriate  - Encourage maximum independence but intervene and supervise when necessary  - Involve family in performance of ADLs  - Assess for home care needs following discharge   - Consider OT consult to assist with ADL evaluation and planning for discharge  - Provide patient education as appropriate  Outcome: Progressing  Goal: Maintain or return mobility status to optimal level  Description  INTERVENTIONS:  - Assess patient's baseline mobility status (ambulation, transfers, stairs, etc )    - Identify cognitive and physical deficits and behaviors that affect mobility  - Identify mobility aids required to assist with transfers and/or ambulation (gait belt, sit-to-stand, lift, walker, cane, etc )  - [place] fall precautions as indicated by assessment  - Record patient progress and toleration of activity level on Mobility SBAR; progress patient to next Phase/Stage  - Instruct patient to call for assistance with activity based on assessment  - Consider rehabilitation consult to assist with strengthening/weightbearing, etc   Outcome: Progressing     Problem: DISCHARGE PLANNING  Goal: Discharge to home or other facility with appropriate resources  Description  INTERVENTIONS:  - Identify barriers to discharge w/patient and caregiver  - Arrange for needed discharge resources and transportation as appropriate  - Identify discharge learning needs (meds, wound care, etc )  - Arrange for interpretive services to assist at discharge as needed  - Refer to Case Management Department for coordinating discharge planning if the patient needs post-hospital services based on physician/advanced practitioner order or complex needs related to functional status, cognitive ability, or social support system  Outcome: Progressing     Problem: Knowledge Deficit  Goal: Patient/family/caregiver demonstrates understanding of disease process, treatment plan, medications, and discharge instructions  Description  Complete learning assessment and assess knowledge base    Interventions:  - Provide teaching at level of understanding  - Provide teaching via preferred learning methods  Outcome: Progressing     Problem: Prexisting or High Potential for Compromised Skin Integrity  Goal: Skin integrity is maintained or improved  Description  INTERVENTIONS:  - Identify patients at risk for skin breakdown  - Assess and monitor skin integrity  - Assess and monitor nutrition and hydration status  - Monitor labs   - Assess for incontinence   - Turn and reposition patient  - Assist with mobility/ambulation  - Relieve pressure over bony prominences  - Avoid friction and shearing  - Provide appropriate hygiene as needed including keeping skin clean and dry  - Evaluate need for skin moisturizer/barrier cream  - Collaborate with interdisciplinary team   - Patient/family teaching  - Consider wound care consult   Outcome: Progressing     Problem: DISCHARGE PLANNING - CARE MANAGEMENT  Goal: Discharge to post-acute care or home with appropriate resources  Description  INTERVENTIONS:  - Conduct assessment to determine patient/family and health care team treatment goals, and need for post-acute services based on payer coverage, community resources, and patient preferences, and barriers to discharge  - Address psychosocial, clinical, and financial barriers to discharge as identified in assessment in conjunction with the patient/family and health care team  - Arrange appropriate level of post-acute services according to patient's   needs and preference and payer coverage in collaboration with the physician and health care team  - Communicate with and update the patient/family, physician, and health care team regarding progress on the discharge plan  - Arrange appropriate transportation to post-acute venues    Need to reassess    Outcome: Progressing     Problem: Nutrition/Hydration-ADULT  Goal: Nutrient/Hydration intake appropriate for improving, restoring or maintaining nutritional needs  Description  Monitor and assess patient's nutrition/hydration status for malnutrition  Collaborate with interdisciplinary team and initiate plan and interventions as ordered  Monitor patient's weight and dietary intake as ordered or per policy  Utilize nutrition screening tool and intervene as necessary  Determine patient's food preferences and provide high-protein, high-caloric foods as appropriate       INTERVENTIONS:  - Monitor oral intake, urinary output, labs, and treatment plans  - Assess nutrition and hydration status and recommend course of action  - Evaluate amount of meals eaten  - Assist patient with eating if necessary   - Allow adequate time for meals  - Recommend/ encourage appropriate diets, oral nutritional supplements, and vitamin/mineral supplements  - Order, calculate, and assess calorie counts as needed  - Recommend, monitor, and adjust tube feedings and TPN/PPN based on assessed needs  - Assess need for intravenous fluids  - Provide specific nutrition/hydration education as appropriate  - Include patient/family/caregiver in decisions related to nutrition  Outcome: Progressing

## 2020-02-17 NOTE — PROGRESS NOTES
Progress Note - Nephrology   Kalia Pascual [de-identified] y o  male MRN: [id number]  Unit/Bed#: -01 Encounter: [id number]    A/P:  1  Acute kidney injury present on admission:  He has improved back to his baseline  His creatinine peaked to 3 07  Today's creatinine is 2 31  May be due to volume depletion and stress of surgery with intraoperative volume depletion  He did require 2 doses of Lasix with improvement  2  Chronic kidney disease stage 4:  Follows with Dr Roselyn Corcoran as an outpatient  3  Hypertension with CKD 4 :  Blood pressure is high today at 168/72  Will  review medications  4  Anemia of CKD: iron saturation good at 58%  No indication for Epogen  5  Non-anion gap metabolic acidosis:  Related to diarrhea  He is receiving sodium bicarbonate    His bicarbonate is up to 25 and will stop sodium bicarbonate tablets      Follow up reason for today's visit:  Acute kidney injury    Acute cholecystitis    Patient Active Problem List   Diagnosis    Weakness generalized    Type 2 diabetes 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, with long-term current use of insulin ([address] )    Essential hypertension    Mixed hyperlipidemia    Cardiac disease    Acute renal failure superimposed on stage 3 chronic kidney disease (HCC)    Hydroureter    Metabolic acidosis    Iron deficiency anemia secondary to inadequate dietary iron intake    Accelerated hypertension    Liver mass    Neuroendocrine tumor    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)    Pressure injury of right heel, unstageable ([address] )    Atherosclerosis of artery of extremity with ulceration (HCC)    Carcinoid syndrome (HCC)    Chronic indwelling Haile catheter    Moderate protein-calorie malnutrition (HCC)    Biliary sludge    Malignant neuroendocrine neoplasm ([address] )    Urinary retention    Cholecystostomy tube dysfunction    Acute pancreatitis without infection or necrosis    UTI due to extended-spectrum beta lactamase (ESBL) producing Escherichia coli    CKD (chronic kidney disease) stage 3, GFR 30-59 ml/min ([facility])    Hypomagnesemia    Anemia    Acute cholecystitis    Shortness of breath    Goals of care, counseling/discussion    Hypernatremia         Subjective:   Feels better  Denies chest pain or shortness of breath  He has crampy abdominal pain prior to having diarrhea  Denies dysuria    Objective:     Vitals: Blood pressure 168/72, pulse 76, temperature (!) 97 4 °F (36 3 °C), temperature source Tympanic, resp  rate 18, height 5' 8" (1 727 m), weight 71 3 kg (157 lb 3 oz), SpO2 98 %  ,Body mass index is 23 9 kg/m²  Weight (last 2 days)     Date/Time   Weight    02/17/20 0600   71 3 (157 19)    02/16/20 0600   72 (158 73)    02/15/20 0600   75 1 (165 57)                Intake/Output Summary (Last 24 hours) at 2/17/2020 0951  Last data filed at 2/17/2020 0857  Gross per 24 hour   Intake 920 ml   Output 1400 ml   Net -480 ml     I/O last 3 completed shifts: In: 720 [P O :720]  Out: 2300 [Urine:2300]    Urethral Catheter 18 Fr   (Active)   Reasons to continue Urinary Catheter  Chronic urinary catheter 2/15/2020  4:01 PM   Goal for Removal N/A- chronic garcia 2/15/2020  4:01 PM   Site Assessment Clean;Skin intact 2/15/2020  4:01 PM   Collection Container Standard drainage bag 2/15/2020  4:01 PM   Securement Method Securing device (Describe) 2/15/2020  4:01 PM   Output (mL) 500 mL 2/17/2020  5:00 AM       Physical Exam: /72   Pulse 76   Temp (!) 97 4 °F (36 3 °C) (Tympanic)   Resp 18   Ht 5' 8" (1 727 m)   Wt 71 3 kg (157 lb 3 oz)   SpO2 98%   BMI 23 90 kg/m²     General Appearance:    Alert, cooperative, no distress, appears stated age   Head:    Normocephalic, without obvious abnormality, atraumatic   Eyes:    Conjunctiva/corneas clear   Ears:    Normal external ears   Nose:   Nares normal, septum midline, mucosa normal, no drainage    or sinus tenderness   Throat:   Lips, mucosa, and tongue normal; teeth and gums normal   Neck:   Supple, symmetrical, trachea midline, no adenopathy;        thyroid:  No enlargement/tenderness/nodules; no carotid    bruit or JVD   Back:     Symmetric, no curvature, ROM normal, no CVA tenderness   Lungs:     Clear to auscultation bilaterally, respirations unlabored   Chest wall:    No tenderness or deformity   Heart:    Regular rate and rhythm, S1 and S2 normal, no murmur, rub   or gallop   Abdomen:     Soft, non-tender, bowel sounds active   Extremities:   Extremities normal, atraumatic, no cyanosis or edema   Skin:   Skin color, texture, turgor normal, no rashes or lesions   Lymph nodes:   Cervical normal   Neurologic:   CNII-XII intact            Lab, Imaging and other studies: I have personally reviewed pertinent labs  CBC:   Lab Results   Component Value Date    WBC 12 00 (H) 02/17/2020    HGB 10 4 (L) 02/17/2020    HCT 31 8 (L) 02/17/2020    MCV 92 02/17/2020     02/17/2020    MCH 30 1 02/17/2020    MCHC 32 8 02/17/2020    RDW 15 8 (H) 02/17/2020    MPV 7 9 (L) 02/17/2020     CMP:   Lab Results   Component Value Date    K 4 0 02/17/2020     02/17/2020    CO2 25 02/17/2020    BUN 17 02/17/2020    CREATININE 2 31 (H) 02/17/2020    CALCIUM 8 1 (L) 02/17/2020    EGFR 26 02/17/2020         Results from last 7 days   Lab Units 02/17/20  0506 02/16/20  0628 02/15/20  0609  02/12/20  0631 02/11/20  0444   POTASSIUM mmol/L 4 0 3 5 4 5   < > 3 2* 3 1*   CHLORIDE mmol/L 107 106 105   < > 107 106   CO2 mmol/L 25 25 26   < > 20* 21   BUN mg/dL 17 17 17   < > 26* 27*   CREATININE mg/dL 2 31* 2 19* 2 25*   < > 3 07* 3 05*   CALCIUM mg/dL 8 1* 8 2* 8 6   < > 8 2* 8 2*   ALK PHOS U/L  --   --   --   --  106 92   ALT U/L  --   --   --   --  9 9   AST U/L  --   --   --   --  12* 13    < > = values in this interval not displayed           Phosphorus: No results found for: PHOS  Magnesium: No results found for: MG  Urinalysis: No results found for: Leatha Pollen, SPECGRAV, PHUR, LEUKOCYTESUR, NITRITE, PROTEINUA, GLUCOSEU, KETONESU, BILIRUBINUR, BLOODU  Ionized Calcium: No results found for: CAION  Coagulation: No results found for: PT, INR, APTT  Troponin: No results found for: TROPONINI  ABG: No results found for: PHART, BFM4LAC, PO2ART, EEB8MUH, H6JCESNE, BEART, SOURCE  Radiology review:     IMAGING  No results found      Current Facility-Administered Medications   Medication Dose Route Frequency    acetaminophen (TYLENOL) tablet 650 mg  650 mg Oral Q6H PRN    amLODIPine (NORVASC) tablet 10 mg  10 mg Oral Daily    b complex-vitamin C-folic acid (NEPHROCAPS) capsule 1 capsule  1 capsule Oral Daily With Dinner    cholecalciferol (VITAMIN D3) tablet 1,000 Units  1,000 Units Oral Daily    cloNIDine (CATAPRES) tablet 0 1 mg  0 1 mg Oral Q3H PRN    colchicine (COLCRYS) tablet 0 3 mg  0 3 mg Oral Daily    heparin (porcine) subcutaneous injection 5,000 Units  5,000 Units Subcutaneous Q8H [facility]    HYDROmorphone (DILAUDID) injection 0 5 mg  0 5 mg Intravenous Q4H PRN    insulin glargine (LANTUS) subcutaneous injection 15 Units 0 15 mL  15 Units Subcutaneous HS    insulin lispro (HumaLOG) 100 units/mL subcutaneous injection 1-5 Units  1-5 Units Subcutaneous TID AC    insulin lispro (HumaLOG) 100 units/mL subcutaneous injection 1-5 Units  1-5 Units Subcutaneous HS    ondansetron (ZOFRAN) injection 4 mg  4 mg Intravenous Q6H PRN    oxyCODONE-acetaminophen (PERCOCET) 5-325 mg per tablet 1 tablet  1 tablet Oral Q4H PRN    oxyCODONE-acetaminophen (PERCOCET) 5-325 mg per tablet 2 tablet  2 tablet Oral Q4H PRN    pantoprazole (PROTONIX) EC tablet 40 mg  40 mg Oral Early Morning    simethicone (MYLICON) chewable tablet 80 mg  80 mg Oral Q6H PRN    sodium bicarbonate tablet 650 mg  650 mg Oral BID after meals    sodium chloride (PF) 0 9 % injection 10 mL  10 mL Intravenous Once per day on Mon Thu    tamsulosin (FLOMAX) capsule 0 4 mg  0 4 mg Oral Daily With Dinner    vancomycin (VANCOCIN) oral solution 125 mg  125 mg Oral Q6H [facility]     Medications Discontinued During This Encounter   Medication Reason    morphine injection 2 mg     sodium chloride (PF) 0 9 % injection 10 mL     bupivacaine-epinephrine (PF) (MARCAINE/EPINEPHRINE PF) 0 25 %-1:[id number] injection Patient Discharge    iohexol (OMNIPAQUE) 300 mg/mL injection Patient Discharge    sodium chloride 0 9 % irrigation solution Patient Discharge    sodium chloride 0 9 % irrigation solution Patient Discharge    morphine injection 2 mg     HYDROmorphone (DILAUDID) injection 0 4 mg Patient Transfer    sodium chloride 0 9 % infusion     piperacillin-tazobactam (ZOSYN) 3 375 g in sodium chloride 0 9 % 100 mL IVPB     sodium chloride infusion 0 45 %     colchicine (COLCRYS) tablet 0 6 mg     piperacillin-tazobactam (ZOSYN) 2 25 g in sodium chloride 0 9 % 50 mL IVPB     docusate sodium (COLACE) capsule 100 mg     multi-electrolyte (PLASMALYTE-A/ISOLYTE-S PH 7 4) IV solution     furosemide (LASIX) injection 20 mg     piperacillin-tazobactam (ZOSYN) 2 25 g in sodium chloride 0 9 % 50 mL IVPB     docusate sodium (COLACE) capsule 100 mg     simethicone (MYLICON) chewable tablet 80 mg Duplicate order    amLODIPine (NORVASC) tablet 10 mg     metroNIDAZOLE (FLAGYL) tablet 500 mg        Wei Leavitt MD      This progress note was produced in part using a dictation device which may document imprecise wording from author's original intent

## 2020-02-17 NOTE — DISCHARGE INSTR - OTHER ORDERS
Skin Care Plan:   1  Apply Allevyn Life foam dressing to bilateral heels for prevention, meliza with P, peel back dressing every shift to assess skin, reapply and change every 3 days and PRN with soilage or dislodgement  2  Elevate heels to offload  3  EHOB waffle cushion to chair when OOB  4  Hydraguard to affected areas to bilateral buttocks and sacrum  5  Skin nourishing cream daily  6  Wound care follow up weekly  7   Turn and reposition Q2 hours using green wedges to offload

## 2020-02-17 NOTE — SOCIAL WORK
Pt not yet stable for discharge per surgery  STR recommended but pt and SO continue to decline same  Pt will return home with SO and [facility]  CM will continue to follow

## 2020-02-17 NOTE — PROGRESS NOTES
Progress Note - Manav Fishman 1940, [de-identified] y o  male MRN: [id number]    Unit/Bed#: -V[facility]ien Encounter: [id number]    Primary Care Provider: Charlotte Gonzales DO   Date and time admitted to hospital: 2/5/2020 10:50 AM        * Acute cholecystitis  Assessment & Plan  POD10 open cholecystectomy for recurrent cholecystitis and cholecystostomy tube blockage    Clinically with fatigue, malaise, and ongoing diarrhea  Hemodynamically stable, AFVSS  On room air  Hematologically with stable WBC at 12k, resolving SILVERIO on CKD, and positive C  Diff as of 02/11  Continue diet as tolerated, AM blood work, supportive measures  Diarrhea secondary to c diff +/- carcinoid syndrome, stools 4x/daily, vitals WNL and WBC <15 by definition nonsevere  Completed flagyl x 4 days, now on vanco day 3 (7 days total)  Unsure if this is recurrent  Continue Vanco 125 mg PO Q6H, contact/hand washing precautions, monitor closely  Subjective/Objective   Chief Complaint: "I'm tired"    Subjective: Patient admits to fatigue and malaise  No change over past 24-48 hours  Denies abdominal pains  No fever or chills  Reports feeling "short winded" with exertion, but tolerating room air at rest  No chest pain  No nausea/vomiting  Having loose stools  4x yesterday and 2x this morning  Associated fecal incontinence due to urgency/liquid consistency  Has Haile catheter  Objective: No overnight events documented  Blood pressure 168/72, pulse 76, temperature (!) 97 4 °F (36 3 °C), temperature source Tympanic, resp  rate 18, height 5' 8" (1 727 m), weight 71 3 kg (157 lb 3 oz), SpO2 98 %  ,Body mass index is 23 9 kg/m²        Intake/Output Summary (Last 24 hours) at 2/17/2020 1011  Last data filed at 2/17/2020 0857  Gross per 24 hour   Intake 920 ml   Output 1400 ml   Net -480 ml       Invasive Devices     Peripheral Intravenous Line            Peripheral IV 02/16/20 Left;Ventral (anterior) Wrist 1 day          Drain            Urethral Catheter 18 Fr  55 days              Physical Exam   Constitutional: He is oriented to person, place, and time  He appears well-developed and well-nourished  No distress  Appear fatigued  Non-toxic appearing  HENT:   Head: Normocephalic and atraumatic  Eyes: Pupils are equal, round, and reactive to light  EOM are normal    Neck: Normal range of motion  Cardiovascular: Normal rate and regular rhythm  No murmur heard  Pulmonary/Chest: Breath sounds normal  No respiratory distress  Shallow respirations, poor inspiratory effort  Abdominal: Soft  Bowel sounds are normal  He exhibits no distension  There is no tenderness  Round, soft, non-tender  No gaseous distention  Port sites C/D/I  Right subcostal incision C/D/I with staples  No guarding or rigidity  Musculoskeletal: Normal range of motion  Neurological: He is alert and oriented to person, place, and time  Skin: Skin is warm and dry  Capillary refill takes less than 2 seconds  No rash noted  He is not diaphoretic  Psychiatric: He has a normal mood and affect  His behavior is normal          Lab, Imaging and other studies:  I have personally reviewed pertinent lab results    , CBC:   Lab Results   Component Value Date    WBC 12 00 (H) 02/17/2020    HGB 10 4 (L) 02/17/2020    HCT 31 8 (L) 02/17/2020    MCV 92 02/17/2020     02/17/2020    MCH 30 1 02/17/2020    MCHC 32 8 02/17/2020    RDW 15 8 (H) 02/17/2020    MPV 7 9 (L) 02/17/2020   , CMP:   Lab Results   Component Value Date    SODIUM 141 02/17/2020    K 4 0 02/17/2020     02/17/2020    CO2 25 02/17/2020    BUN 17 02/17/2020    CREATININE 2 31 (H) 02/17/2020    CALCIUM 8 1 (L) 02/17/2020    EGFR 26 02/17/2020     VTE Pharmacologic Prophylaxis: Heparin  VTE Mechanical Prophylaxis: sequential compression device

## 2020-02-17 NOTE — CONSULTS
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, DDM2, HTN, stage 4 CKD, and a chronic garcia catheter  Patient is currently on contact precautions for C-diff and ESBL  Patient has had intermittent incontinence of bowels due to C-diff  Patient wife at the bedside  1  Right heel blanchable erythema  2  Left heel intact  3  Bilateral buttocks and sacrum MASD, intact scabbed area to the right of the sacrum, no open areas noted, all areas blanchable erythema    Plan:   1  Apply Allevyn Life foam dressing to bilateral heels for prevention, meliza with P, peel back dressing every shift to assess skin, reapply and change every 3 days and PRN with soilage or dislodgement  2  Elevate heels to offload  3  EHOB waffle cushion to chair when OOB  4  Hydraguard to affected areas to bilateral buttocks and sacrum  5  Skin nourishing cream daily    Wound 02/07/20 Incision Abdomen N/A (Active)   Wound Description Clean;Dry; Intact 2/17/2020  8:00 AM   Avis-wound Assessment Clean;Dry; Intact 2/16/2020  8:51 PM   Closure Staples 2/16/2020  8:51 PM   Drainage Amount None 2/16/2020  8:51 PM   Dressing Open to air 2/17/2020  8:00 AM   Dressing Status Clean;Dry; Intact 2/16/2020  8:51 PM       Wound 02/07/20 Incision Abdomen Other (Comment) (Active)   Wound Description Clean;Dry; Intact 2/16/2020  8:51 PM   Avis-wound Assessment Clean;Dry; Intact 2/16/2020  8:51 PM   Closure Open to air 2/16/2020  8:51 PM   Drainage Amount SYLVIE 2/12/2020 10:00 AM   Dressing Open to air 2/16/2020  8:51 PM   Dressing Status Clean;Dry; Intact 2/16/2020  8:51 PM       Wound 02/15/20 Moisture associated skin damage Sacrum Left;Right (Active)   Wound Image   2/15/2020  7:51 AM   Wound Description Beefy red;Pink 2/17/2020  8:30 AM   Avis-wound Assessment Erythema; Intact 2/17/2020  8:30 AM   Wound Length (cm)  2/15/2020  9:00 AM   Wound Width (cm)  2/15/2020  9:00 AM   Wound Depth (cm)  2/15/2020  9:00 AM   Calculated Wound Area (cm^2) 9 cm^2 2/15/2020  9:00 AM   Calculated Wound Volume (cm^3) 0 9 cm^3 2/15/2020  9:00 AM   Change in Wound Size % 0 2/15/2020  9:00 AM   Drainage Amount None 2/16/2020  8:51 PM   Non-staged Wound Description Not applicable 9/90/8088  8:94 PM   Treatments ;Site care;Elevated 2/16/2020  9:00 AM   Dressing Protective barrier 2/17/2020  8:30 AM   Wound packed?  No 2/16/2020  8:51 PM   Patient Tolerance Tolerated well 2/17/2020  8:30 AM     Reviewed plan of care with primary RN Daniel Robles  Recommendations written as orders  Wound care to follow weekly  Questions or concerns contact Rosa MAYERSN, RN

## 2020-02-18 LAB
ALBUMIN SERPL BCP-MCNC: 2.9 G/DL (ref 3.5–5.7)
ALP SERPL-CCNC: 100 U/L (ref 55–165)
ALT SERPL W P-5'-P-CCNC: 9 U/L (ref 7–52)
ANION GAP SERPL CALCULATED.3IONS-SCNC: 8 MMOL/L (ref 4–13)
AST SERPL W P-5'-P-CCNC: 12 U/L (ref 13–39)
BILIRUB SERPL-MCNC: 0.4 MG/DL (ref 0.2–1)
BUN SERPL-MCNC: 18 MG/DL (ref 7–25)
CALCIUM SERPL-MCNC: 8.4 MG/DL (ref 8.6–10.5)
CHLORIDE SERPL-SCNC: 106 MMOL/L (ref 98–107)
CO2 SERPL-SCNC: 26 MMOL/L (ref 21–31)
CREAT SERPL-MCNC: 2.43 MG/DL (ref 0.7–1.3)
CREAT UR-MCNC: 27.5 MG/DL
ERYTHROCYTE [DISTWIDTH] IN BLOOD BY AUTOMATED COUNT: 16.1 % (ref 11.5–14.5)
GFR SERPL CREATININE-BSD FRML MDRD: 24 ML/MIN/1.73SQ M
GLUCOSE SERPL-MCNC: 179 MG/DL (ref 65–140)
GLUCOSE SERPL-MCNC: 205 MG/DL (ref 65–140)
GLUCOSE SERPL-MCNC: 250 MG/DL (ref 65–140)
GLUCOSE SERPL-MCNC: 94 MG/DL (ref 65–99)
GLUCOSE SERPL-MCNC: 95 MG/DL (ref 65–140)
HCT VFR BLD AUTO: 31.9 % (ref 42–47)
HGB BLD-MCNC: 10.6 G/DL (ref 14–18)
MCH RBC QN AUTO: 30.4 PG (ref 26–34)
MCHC RBC AUTO-ENTMCNC: 33.1 G/DL (ref 31–37)
MCV RBC AUTO: 92 FL (ref 81–99)
PLATELET # BLD AUTO: 213 THOUSANDS/UL (ref 149–390)
PMV BLD AUTO: 8.1 FL (ref 8.6–11.7)
POTASSIUM SERPL-SCNC: 4 MMOL/L (ref 3.5–5.5)
PROT SERPL-MCNC: 5.7 G/DL (ref 6.4–8.9)
RBC # BLD AUTO: 3.47 MILLION/UL (ref 4.3–5.9)
SODIUM 24H UR-SCNC: 79 MOL/L
SODIUM SERPL-SCNC: 140 MMOL/L (ref 134–143)
UUN 24H UR-MCNC: 145 MG/DL
WBC # BLD AUTO: 10.3 THOUSAND/UL (ref 4.8–10.8)

## 2020-02-18 PROCEDURE — 99233 SBSQ HOSP IP/OBS HIGH 50: CPT | Performed by: INTERNAL MEDICINE

## 2020-02-18 PROCEDURE — 97530 THERAPEUTIC ACTIVITIES: CPT

## 2020-02-18 PROCEDURE — 84300 ASSAY OF URINE SODIUM: CPT | Performed by: NURSE PRACTITIONER

## 2020-02-18 PROCEDURE — 82948 REAGENT STRIP/BLOOD GLUCOSE: CPT

## 2020-02-18 PROCEDURE — 85027 COMPLETE CBC AUTOMATED: CPT | Performed by: PHYSICIAN ASSISTANT

## 2020-02-18 PROCEDURE — 80053 COMPREHEN METABOLIC PANEL: CPT | Performed by: PHYSICIAN ASSISTANT

## 2020-02-18 PROCEDURE — 82570 ASSAY OF URINE CREATININE: CPT | Performed by: NURSE PRACTITIONER

## 2020-02-18 PROCEDURE — 99024 POSTOP FOLLOW-UP VISIT: CPT | Performed by: SPECIALIST

## 2020-02-18 PROCEDURE — 84540 ASSAY OF URINE/UREA-N: CPT | Performed by: NURSE PRACTITIONER

## 2020-02-18 PROCEDURE — 97116 GAIT TRAINING THERAPY: CPT

## 2020-02-18 RX ORDER — SODIUM CHLORIDE 9 MG/ML
75 INJECTION, SOLUTION INTRAVENOUS ONCE
Status: COMPLETED | OUTPATIENT
Start: 2020-02-18 | End: 2020-02-18

## 2020-02-18 RX ORDER — HYDRALAZINE HYDROCHLORIDE 25 MG/1
50 TABLET, FILM COATED ORAL EVERY 8 HOURS SCHEDULED
Status: DISCONTINUED | OUTPATIENT
Start: 2020-02-18 | End: 2020-02-20 | Stop reason: HOSPADM

## 2020-02-18 RX ORDER — CHOLESTYRAMINE LIGHT 4 G/5.7G
4 POWDER, FOR SUSPENSION ORAL 2 TIMES DAILY
Status: DISCONTINUED | OUTPATIENT
Start: 2020-02-18 | End: 2020-02-20 | Stop reason: HOSPADM

## 2020-02-18 RX ADMIN — VANCOMYCIN HYDROCHLORIDE 125 MG: 500 INJECTION, POWDER, LYOPHILIZED, FOR SOLUTION INTRAVENOUS at 12:04

## 2020-02-18 RX ADMIN — CHOLESTYRAMINE 4 G: 4 POWDER, FOR SUSPENSION ORAL at 12:03

## 2020-02-18 RX ADMIN — INSULIN LISPRO 2 UNITS: 100 INJECTION, SOLUTION INTRAVENOUS; SUBCUTANEOUS at 17:02

## 2020-02-18 RX ADMIN — HYDRALAZINE HYDROCHLORIDE 50 MG: 25 TABLET, FILM COATED ORAL at 22:54

## 2020-02-18 RX ADMIN — HEPARIN SODIUM 5000 UNITS: 5000 INJECTION INTRAVENOUS; SUBCUTANEOUS at 22:53

## 2020-02-18 RX ADMIN — INSULIN LISPRO 1 UNITS: 100 INJECTION, SOLUTION INTRAVENOUS; SUBCUTANEOUS at 22:53

## 2020-02-18 RX ADMIN — COLCHICINE 0.3 MG: 0.6 TABLET, FILM COATED ORAL at 09:22

## 2020-02-18 RX ADMIN — CHOLESTYRAMINE 4 G: 4 POWDER, FOR SUSPENSION ORAL at 17:03

## 2020-02-18 RX ADMIN — VITAMIN D, TAB 1000IU (100/BT) 1000 UNITS: 25 TAB at 09:22

## 2020-02-18 RX ADMIN — OXYCODONE HYDROCHLORIDE AND ACETAMINOPHEN 1 TABLET: 5; 325 TABLET ORAL at 00:13

## 2020-02-18 RX ADMIN — Medication 1 CAPSULE: at 17:03

## 2020-02-18 RX ADMIN — HEPARIN SODIUM 5000 UNITS: 5000 INJECTION INTRAVENOUS; SUBCUTANEOUS at 17:02

## 2020-02-18 RX ADMIN — TAMSULOSIN HYDROCHLORIDE 0.4 MG: 0.4 CAPSULE ORAL at 17:03

## 2020-02-18 RX ADMIN — VANCOMYCIN HYDROCHLORIDE 125 MG: 500 INJECTION, POWDER, LYOPHILIZED, FOR SOLUTION INTRAVENOUS at 00:13

## 2020-02-18 RX ADMIN — HYDRALAZINE HYDROCHLORIDE 50 MG: 25 TABLET, FILM COATED ORAL at 17:03

## 2020-02-18 RX ADMIN — VANCOMYCIN HYDROCHLORIDE 125 MG: 500 INJECTION, POWDER, LYOPHILIZED, FOR SOLUTION INTRAVENOUS at 17:37

## 2020-02-18 RX ADMIN — SODIUM CHLORIDE 75 ML/HR: 0.9 INJECTION, SOLUTION INTRAVENOUS at 12:04

## 2020-02-18 RX ADMIN — VANCOMYCIN HYDROCHLORIDE 125 MG: 500 INJECTION, POWDER, LYOPHILIZED, FOR SOLUTION INTRAVENOUS at 05:34

## 2020-02-18 RX ADMIN — PANTOPRAZOLE SODIUM 40 MG: 40 TABLET, DELAYED RELEASE ORAL at 05:27

## 2020-02-18 RX ADMIN — AMLODIPINE BESYLATE 10 MG: 5 TABLET ORAL at 09:23

## 2020-02-18 RX ADMIN — INSULIN LISPRO 1 UNITS: 100 INJECTION, SOLUTION INTRAVENOUS; SUBCUTANEOUS at 12:03

## 2020-02-18 RX ADMIN — INSULIN GLARGINE 15 UNITS: 100 INJECTION, SOLUTION SUBCUTANEOUS at 22:54

## 2020-02-18 RX ADMIN — HYDRALAZINE HYDROCHLORIDE 25 MG: 25 TABLET, FILM COATED ORAL at 05:27

## 2020-02-18 RX ADMIN — HEPARIN SODIUM 5000 UNITS: 5000 INJECTION INTRAVENOUS; SUBCUTANEOUS at 05:26

## 2020-02-18 NOTE — PLAN OF CARE
Problem: Potential for Falls  Goal: Patient will remain free of falls  Description  INTERVENTIONS:  - Assess patient frequently for physical needs  -  Identify cognitive and physical deficits and behaviors that affect risk of falls    -  [place] fall precautions as indicated by assessment   - Educate patient/family on patient safety including physical limitations  - Instruct patient to call for assistance with activity based on assessment  - Modify environment to reduce risk of injury  - Consider OT/PT consult to assist with strengthening/mobility  Outcome: Progressing     Problem: PAIN - ADULT  Goal: Verbalizes/displays adequate comfort level or baseline comfort level  Description  Interventions:  - Encourage patient to monitor pain and request assistance  - Assess pain using appropriate pain scale  - Administer analgesics based on type and severity of pain and evaluate response  - Implement non-pharmacological measures as appropriate and evaluate response  - Consider cultural and social influences on pain and pain management  - Notify physician/advanced practitioner if interventions unsuccessful or patient reports new pain  Outcome: Progressing     Problem: INFECTION - ADULT  Goal: Absence or prevention of progression during hospitalization  Description  INTERVENTIONS:  - Assess and monitor for signs and symptoms of infection  - Monitor lab/diagnostic results  - Monitor all insertion sites, i e  indwelling lines, tubes, and drains  - Monitor endotracheal if appropriate and nasal secretions for changes in amount and color  - [place] appropriate cooling/warming therapies per order  - Administer medications as ordered  - Instruct and encourage patient and family to use good hand hygiene technique  - Identify and instruct in appropriate isolation precautions for identified infection/condition  Outcome: Progressing  Goal: Absence of fever/infection during neutropenic period  Description  INTERVENTIONS:  - Monitor WBC    Outcome: Progressing     Problem: SAFETY ADULT  Goal: Patient will remain free of falls  Description  INTERVENTIONS:  - Assess patient frequently for physical needs  -  Identify cognitive and physical deficits and behaviors that affect risk of falls    -  [place] fall precautions as indicated by assessment   - Educate patient/family on patient safety including physical limitations  - Instruct patient to call for assistance with activity based on assessment  - Modify environment to reduce risk of injury  - Consider OT/PT consult to assist with strengthening/mobility  Outcome: Progressing  Goal: Maintain or return to baseline ADL function  Description  INTERVENTIONS:  -  Assess patient's ability to carry out ADLs; assess patient's baseline for ADL function and identify physical deficits which impact ability to perform ADLs (bathing, care of mouth/teeth, toileting, grooming, dressing, etc )  - Assess/evaluate cause of self-care deficits   - Assess range of motion  - Assess patient's mobility; develop plan if impaired  - Assess patient's need for assistive devices and provide as appropriate  - Encourage maximum independence but intervene and supervise when necessary  - Involve family in performance of ADLs  - Assess for home care needs following discharge   - Consider OT consult to assist with ADL evaluation and planning for discharge  - Provide patient education as appropriate  Outcome: Progressing  Goal: Maintain or return mobility status to optimal level  Description  INTERVENTIONS:  - Assess patient's baseline mobility status (ambulation, transfers, stairs, etc )    - Identify cognitive and physical deficits and behaviors that affect mobility  - Identify mobility aids required to assist with transfers and/or ambulation (gait belt, sit-to-stand, lift, walker, cane, etc )  - [place] fall precautions as indicated by assessment  - Record patient progress and toleration of activity level on Mobility SBAR; progress patient to next Phase/Stage  - Instruct patient to call for assistance with activity based on assessment  - Consider rehabilitation consult to assist with strengthening/weightbearing, etc   Outcome: Progressing     Problem: DISCHARGE PLANNING  Goal: Discharge to home or other facility with appropriate resources  Description  INTERVENTIONS:  - Identify barriers to discharge w/patient and caregiver  - Arrange for needed discharge resources and transportation as appropriate  - Identify discharge learning needs (meds, wound care, etc )  - Arrange for interpretive services to assist at discharge as needed  - Refer to Case Management Department for coordinating discharge planning if the patient needs post-hospital services based on physician/advanced practitioner order or complex needs related to functional status, cognitive ability, or social support system  Outcome: Progressing     Problem: Knowledge Deficit  Goal: Patient/family/caregiver demonstrates understanding of disease process, treatment plan, medications, and discharge instructions  Description  Complete learning assessment and assess knowledge base    Interventions:  - Provide teaching at level of understanding  - Provide teaching via preferred learning methods  Outcome: Progressing     Problem: Prexisting or High Potential for Compromised Skin Integrity  Goal: Skin integrity is maintained or improved  Description  INTERVENTIONS:  - Identify patients at risk for skin breakdown  - Assess and monitor skin integrity  - Assess and monitor nutrition and hydration status  - Monitor labs   - Assess for incontinence   - Turn and reposition patient  - Assist with mobility/ambulation  - Relieve pressure over bony prominences  - Avoid friction and shearing  - Provide appropriate hygiene as needed including keeping skin clean and dry  - Evaluate need for skin moisturizer/barrier cream  - Collaborate with interdisciplinary team   - Patient/family teaching  - Consider wound care consult   Outcome: Progressing     Problem: DISCHARGE PLANNING - CARE MANAGEMENT  Goal: Discharge to post-acute care or home with appropriate resources  Description  INTERVENTIONS:  - Conduct assessment to determine patient/family and health care team treatment goals, and need for post-acute services based on payer coverage, community resources, and patient preferences, and barriers to discharge  - Address psychosocial, clinical, and financial barriers to discharge as identified in assessment in conjunction with the patient/family and health care team  - Arrange appropriate level of post-acute services according to patient's   needs and preference and payer coverage in collaboration with the physician and health care team  - Communicate with and update the patient/family, physician, and health care team regarding progress on the discharge plan  - Arrange appropriate transportation to post-acute venues    Need to reassess    Outcome: Progressing     Problem: Nutrition/Hydration-ADULT  Goal: Nutrient/Hydration intake appropriate for improving, restoring or maintaining nutritional needs  Description  Monitor and assess patient's nutrition/hydration status for malnutrition  Collaborate with interdisciplinary team and initiate plan and interventions as ordered  Monitor patient's weight and dietary intake as ordered or per policy  Utilize nutrition screening tool and intervene as necessary  Determine patient's food preferences and provide high-protein, high-caloric foods as appropriate       INTERVENTIONS:  - Monitor oral intake, urinary output, labs, and treatment plans  - Assess nutrition and hydration status and recommend course of action  - Evaluate amount of meals eaten  - Assist patient with eating if necessary   - Allow adequate time for meals  - Recommend/ encourage appropriate diets, oral nutritional supplements, and vitamin/mineral supplements  - Order, calculate, and assess calorie counts as needed  - Recommend, monitor, and adjust tube feedings and TPN/PPN based on assessed needs  - Assess need for intravenous fluids  - Provide specific nutrition/hydration education as appropriate  - Include patient/family/caregiver in decisions related to nutrition  Outcome: Progressing

## 2020-02-18 NOTE — PHYSICAL THERAPY NOTE
Physical Therapy Treatment Session Note    Patient's Name: Analisa Davidson    Admitting Diagnosis  Acute acalculous cholecystitis    Problem List  Patient Active Problem List   Diagnosis    Weakness generalized    Type 2 diabetes 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, with long-term current use of insulin ([address] )    Essential hypertension    Mixed hyperlipidemia    Cardiac disease    Acute renal failure superimposed on stage 3 chronic kidney disease (HCC)    Hydroureter    Metabolic acidosis    Iron deficiency anemia secondary to inadequate dietary iron intake    Accelerated hypertension    Liver mass    Neuroendocrine tumor    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)    Pressure injury of right heel, unstageable ([address] )    Atherosclerosis of artery of extremity with ulceration (HCC)    Carcinoid syndrome (HCC)    Chronic indwelling Haile catheter    Moderate protein-calorie malnutrition (HCC)    Biliary sludge    Malignant neuroendocrine neoplasm ([address] )    Urinary retention    Cholecystostomy tube dysfunction    Acute pancreatitis without infection or necrosis    UTI due to extended-spectrum beta lactamase (ESBL) producing Escherichia coli    CKD (chronic kidney disease) stage 3, GFR 30-59 ml/min (HCC)    Hypomagnesemia    Anemia    Acute cholecystitis    Shortness of breath    Goals of care, counseling/discussion    Hypernatremia       Past Medical History  Past Medical History:   Diagnosis Date    Acute pancreatitis without infection or necrosis 9/26/2019    Anemia of chronic renal failure     BPH (benign prostatic hyperplasia)     Cancer (HCC)     liver cancer    Cardiac disease     Chronic kidney disease, stage 3 ([address] )     Coronary artery disease     Diabetes mellitus ([address] )     DJD (degenerative joint disease)     Essential hypertension     Gait disturbance     Generalized weakness     GERD (gastroesophageal reflux disease)     Gout     History of transfusion     Hydronephrosis     Hyperlipidemia     Hypertension     Liver cancer [facility] )     Pressure injury of skin     Proteinuria     Renal disorder     Retention of urine     Thrombophlebitis migrans     Type 2 diabetes mellitus (HCC)     Type 2 diabetes 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, with long-term current use of insulin ([facility] ) 1/23/2019       Past Surgical History  Past Surgical History:   Procedure Laterality Date    CHOLECYSTECTOMY LAPAROSCOPIC N/A 2/7/2020    Procedure: CHOLECYSTECTOMY LAPAROSCOPIC;  Surgeon: Twan Ledezma MD;  Location: [address] [place] [address] OR;  Service: General    CORONARY ANGIOPLASTY WITH STENT PLACEMENT      IR IMAGE GUIDED BIOPSY/ASPIRATION LIVER  1/24/2019    IR TUBE PLACEMENT ROQUE  9/6/2019 02/18/20 1206   Pain Assessment   Pain Assessment No/denies pain   Pain Score No Pain  ("just feel weak")   Restrictions/Precautions   Weight Bearing Precautions Per Order No   Other Precautions Contact/isolation; Fall Risk; Chair Alarm; Bed Alarm;Hard of hearing   General   Chart Reviewed Yes   Response to Previous Treatment Patient unable to report, no changes reported from family or staff   Family/Caregiver Present No   Cognition   Overall Cognitive Status [facility]   Arousal/Participation Alert; Cooperative   Attention Attends with cues to redirect   Orientation Level Oriented X4   Memory Decreased recall of precautions   Following Commands Follows one step commands with increased time or repetition   Comments Pt  agreeable to mobilize YANNICKOB  Maikel Nagel RN present and reported Zack Share was appropriate for interventions  Subjective   Subjective "I may have to use the toilet"   Bed Mobility   Rolling L 4  Minimal assistance   Additional items Assist x 1;HOB elevated; Bedrails; Increased time required;Verbal cues;LE management   Supine to Sit 3  Moderate assistance   Additional items Assist x 1;HOB elevated; Bedrails; Increased time required;Verbal cues;LE management   Sit to Supine   (DNT re:pt  remained OOB in chair upon conclusion )   Transfers   Sit to Stand 4  Minimal assistance   Additional items Assist x 2;Bedrails; Increased time required;Verbal cues   Stand to Sit 4  Minimal assistance   Additional items Assist x 2;Armrests; Increased time required;Verbal cues   Stand pivot 3  Moderate assistance   Additional items Assist x 1; Increased time required;Verbal cues   Toilet transfer 3  Moderate assistance   Additional items Assist x 1; Increased time required;Verbal cues;Standard toilet  (B grab bars, max A of 2nd staff for janneth care s/p BM)   Ambulation/Elevation   Gait pattern Improper Weight shift; Forward Flexion;Narrow AMANDO; Decreased foot clearance; Short stride   Gait Assistance 4  Minimal assist   Additional items Assist x 1;Verbal cues; Tactile cues   Assistive Device Rolling walker   Distance 25 feet x 2  (to/from bathroom)   Stair Management Assistance Not tested   Balance   Static Sitting Fair +   Dynamic Sitting Fair   Static Standing Fair -   Dynamic Standing Fair -   Ambulatory Fair -   Endurance Deficit   Endurance Deficit Yes   Endurance Deficit Description Fatigue w/ambulatory progression and extended duration on toilet  Activity Tolerance   Activity Tolerance Patient limited by fatigue   Nurse Made Aware Carmel Navas RN was present during session   Assessment   Prognosis Fair   Problem List Decreased strength;Decreased endurance; Impaired balance;Decreased mobility; Impaired hearing;Pain;Decreased safety awareness   Assessment Pt seen for PT treatment session this date with interventions consisting of gait training w/ emphasis on improving pt's ability to ambulate level surfaces x 25 feet x 2 with min A provided by therapist with RW and therapeutic activity consisting of training: bed mobility, supine<>sit transfers, sit<>stand transfers, static sitting tolerance at EOB for 5 minutes w/ B UE support, static standing tolerance for 3 minutes w/ B UE support, vc and tactile cues for static sitting posture faciliation, vc and tactile cues for static standing posture faciliation, stand pivot transfers towards B direction and toileting on standard toilet w/max assistance for janneth care  Pt agreeable to PT treatment session upon arrival, pt found supine in bed w/ HOB elevated, in no apparent distress, A&O x 4 and reported feeling fatigued and "weak"  In comparison to previous session, pt with improvements in ambulatory progression  Post session: chair alarm engaged, all needs in reach and RN notified of session findings/recommendations Continue to recommend STR at time of d/c in order to maximize pt's functional independence and safety w/ mobility  Pt continues to be functioning below baseline level, and remains limited 2* factors listed above and including weakness,impaired balance, decreased endurance, activity tolerance, gait dysfunction, and decline from PLOF  PT will continue to see pt while here in order to address the deficits listed above and provide interventions consistent w/ POC in effort to achieve LTGs  Barriers to Discharge Inaccessible home environment   Goals   Patient Goals to feel stronger   LTG Expiration Date 20  (w/ date extension provided and updated goals for  POC)   Long Term Goal #1 1 )Patient will complete bed mobility with CGA of 1 for decrease need for caregiver assistance, decrease burden of care  2 ) Patient will complete transfers with CGA of 1 to decrease risk of falls, facilitate upright standing posture  3 ) BLE strength to greater than/equal to 4/5 gross musculature to increase ability to safely transfer, control descent to chair  4 ) Patient will exhibit increase dynamic standing balance to Fair+ ,5-6 minutes without LOB and CGA of 1 to improve activity tolerance & endurance   5 ) Patient will exhibit increase dynamic ambulatory balance to Fair+ for  feet w/RW CGA of 1  to improve ability to mobilize to toilet, chair and decrease risk for additional medical complications  6 ) Patient will exhibit good self monitoring and ability to follow 2 step commands to increase complexity of tasks and resume ADL's without LOB  PT Treatment Day 4   Plan   Treatment/Interventions Functional transfer training;LE strengthening/ROM; Therapeutic exercise; Endurance training;Patient/family training;Equipment eval/education; Bed mobility;Gait training;Spoke to nursing;OT   Progress Slow progress, decreased activity tolerance   PT Frequency Other (Comment)  (3-5x/wk)   Recommendation   Recommendation Short-term skilled PT   Equipment Recommended Walker  (pt  has own)   PT - OK to Discharge Yes  (if medically stable to STR)   Additional Comments Upon conclusion,pt  was sitting OOB in chair w/chair alarm engaged and all needs within reach       Brayan Boucher, PT

## 2020-02-18 NOTE — PROGRESS NOTES
NEPHROLOGY PROGRESS NOTE   Ivy Robles [de-identified] y o  male MRN: [id number]  Unit/Bed#: -01 Encounter: [id number]    Assessment/Plan:      1  Acute Kidney Injury: Present on Admission  ? Upon review of the medical record, baseline creatinine appears to be around 2 1-2 4 mg/dL, frequent incidence of AKIs  ? Admission creatinine 2 07 mg/dL -> 3 07 mg/dL peak -> 2 43 mg/dL today  Had previously lower, around 1 mg/dL but now is slowly increasing  Is eating and drinking well but still having frequent bowel movements  ? Will recheck urine chemistries and give 1 L normal saline at 75 mL/hour, check labs in morning  ? Non oliguric with urinary catheter patent and draining  ? Continue to monitor strict, IO, daily weight, BMPs, adjust meds for eGFR  ? Avoid nephrotoxins, avoid hypotension and wide variation in blood pressure  ? No indication for urgent or emergent renal replacement therapy  Monitor for renal recovery  2  Stage 4 Chronic Kidney Disease  ? Baseline creatinine as above  ? Longstanding diabetes mellitus and neuroendocrine carcinoma  ? Follow's in [facility], last visit 1/16/20  Will need outpatient follow-up  3  Metabolic Acidosis  ? Chronically does take sodium bicarbonate tablets however these were stopped yesterday as bicarbonate was up to 25  4  Urinary Retention with Chronic indwelling urinary catheter  ? Patient and draining appropriately  ? Continue flomax  5  Hypertension associated with CKD 4  · Continues to be elevated today  Will increase hydralazine to 50 mg q 8 hours  Hold for BP less than 120  6  Neuroendocrine Carcinoma with Liver Metastases  ? Lancreotide injections 120 mg every 4 weeks, last dose 1/14/20  ? Last oncology visit 1/7/20  7  Cholecystitis  ? POD # 11 open cholecystectomy, gen/surg leading case  For surgery documentation, neuroendocrine tumor was noted within the gallbladder specimen  8  DM 2 associated with CKD 4   ? Most recent a1c 8 1%  ?  Needs good blood sugar control  9  C diff colitis  10   Protein Calorie Malnutrition  ? Continue prosource and adjust per patient's preference     11  Anemia  ? Hgb 10 5 today, s/p PRBC transfusion  No indication for Epogen as iron saturation 50%  HPI/24hr events:   [de-identified] yo male presented 2/5/20 with acute cholecystitis, underwent open cholecystecomty on 2/7/20  Renal consultation for acute kidney injury  C diff positive  24 hour events:  Patient continues to have loose bowel movements however less than frequency  Creatinine up trending  neuroendocrine tumor was noted within the gallbladder specimen  Blood pressure is increased  ROS  Patient states he still feels very fatigued and tired  Denies chest pain, shortness of breath, nausea, vomiting, dysuria, pain at urinary catheter insertion site  Still having frequent bowel movements  A complete 10 point review of systems have been performed and are otherwise negative         Historical Information   Past Medical History:   Diagnosis Date    Acute pancreatitis without infection or necrosis 9/26/2019    Anemia of chronic renal failure     BPH (benign prostatic hyperplasia)     Cancer (HCC)     liver cancer    Cardiac disease     Chronic kidney disease, stage 3 (HCC)     Coronary artery disease     Diabetes mellitus ([address] )     DJD (degenerative joint disease)     Essential hypertension     Gait disturbance     Generalized weakness     GERD (gastroesophageal reflux disease)     Gout     History of transfusion     Hydronephrosis     Hyperlipidemia     Hypertension     Liver cancer ([address] )     Pressure injury of skin     Proteinuria     Renal disorder     Retention of urine     Thrombophlebitis migrans     Type 2 diabetes mellitus ([address] )     Type 2 diabetes 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, with long-term current use of insulin ([address] ) 1/23/2019     Past Surgical History:   Procedure Laterality Date    CHOLECYSTECTOMY LAPAROSCOPIC N/A 2/7/2020 Procedure: CHOLECYSTECTOMY LAPAROSCOPIC;  Surgeon:  Mitch Canavan, MD;  Location: [facility] MAIN OR;  Service: General    CORONARY ANGIOPLASTY WITH STENT PLACEMENT      IR IMAGE GUIDED BIOPSY/ASPIRATION LIVER  1/24/2019    IR TUBE PLACEMENT ROQUE  9/6/2019     Social History   Social History     Substance and Sexual Activity   Alcohol Use Never    Frequency: Never     Social History     Substance and Sexual Activity   Drug Use Never     Social History     Tobacco Use   Smoking Status Never Smoker   Smokeless Tobacco Never Used       Family History:   Family History   Problem Relation Age of Onset    Cancer Mother     Hypertension Father     Cancer Brother     Cancer Maternal Grandmother     Cancer Paternal Aunt        Medications:  Pertinent medications were reviewed    Current Facility-Administered Medications:  acetaminophen 650 mg Oral Q6H PRN Jamesetta Leventhal, PA-C   amLODIPine 10 mg Oral Daily RIKA Jaimes   b complex-vitamin C-folic acid 1 capsule Oral Daily With Dinner Jamesetta Leventhal, PA-C   cholecalciferol 1,000 Units Oral Daily Jamesetta Leventhal, PA-C   cloNIDine 0 1 mg Oral Q3H PRN Long Gonsalves PA-C   colchicine 0 3 mg Oral Daily RIKA Crow   heparin (porcine) 5,000 Units Subcutaneous [facility] Jamesetta Leventhal, PA-C   hydrALAZINE 25 mg Oral Q8H Qi Coleman MD   HYDROmorphone 0 5 mg Intravenous Q4H PRN Jamesetta Leventhal, PA-C   insulin glargine 15 Units Subcutaneous HS Xavi Ivey MD   insulin lispro 1-5 Units Subcutaneous TID AC Jamesetta Leventhal, PA-C   insulin lispro 1-5 Units Subcutaneous HS Jamesetta Leventhal, PA-C   ondansetron 4 mg Intravenous Q6H PRN Jamesetta Leventhal, PA-C   oxyCODONE-acetaminophen 1 tablet Oral Q4H PRN Jamesetta Leventhal, PA-C   oxyCODONE-acetaminophen 2 tablet Oral Q4H PRN Jamesetta Leventhal, PA-C   pantoprazole 40 mg Oral Early Morning Jamesetta Leventhal, PA-C   simethicone 80 mg Oral Q6H PRN RIKA Crow   sodium chloride (PF) 10 mL Intravenous Once per day on Mon Thu Jamesetta Leventhal, [place] sodium chloride 75 mL/hr Intravenous Once Alyssa Blackburn, RIKA   tamsulosin 0 4 mg Oral Daily With All, ROBBY   vancomycin 125 mg Oral Q6H [address] Josh Woodall, DO         No Known Allergies      Vitals:   /68 (BP Location: Right arm)   Pulse 65   Temp 97 6 °F (36 4 °C) (Temporal)   Resp 18   Ht 5' 8" (1 727 m)   Wt 72 9 kg (160 lb 11 5 oz)   SpO2 100%   BMI 24 44 kg/m²   Body mass index is 24 44 kg/m²  SpO2: 100 %,   SpO2 Activity: At Rest,   O2 Device: None (Room air)      Intake/Output Summary (Last 24 hours) at 2/18/2020 0999  Last data filed at 2/18/2020 0417  Gross per 24 hour   Intake 760 ml   Output 1400 ml   Net -640 ml     Invasive Devices     Peripheral Intravenous Line            Peripheral IV 02/16/20 Left;Ventral (anterior) Wrist 2 days          Drain            Urethral Catheter 18 Fr  56 days                Physical Exam  General: chronically ill male conscious, cooperative, in no acute distress  Eyes: conjunctivae pink, anicteric sclerae  ENT: lips and mucous membranes dry  Neck: supple, no JVD, no masses  Chest: essentially clear breath sounds bilateral, no crackles, ronchus or wheezings  CVS: S1 & S2, normal rate, regular rhythm  Abdomen: soft, non-tender, non-distended, normoactive bowel sounds  Extremities: no edema of both legs  Skin: no rash  Neuro: awake, alert, oriented      Diagnostic Data:  Lab: I have personally reviewed pertinent lab results  ,   CBC:  Results from last 7 days   Lab Units 02/18/20  0526   WBC Thousand/uL 10 30   HEMOGLOBIN g/dL 10 6*   HEMATOCRIT % 31 9*   PLATELETS Thousands/uL 213      CMP: Lab Results   Component Value Date    SODIUM 140 02/18/2020    K 4 0 02/18/2020     02/18/2020    CO2 26 02/18/2020    BUN 18 02/18/2020    CREATININE 2 43 (H) 02/18/2020    CALCIUM 8 4 (L) 02/18/2020    AST 12 (L) 02/18/2020    ALT 9 02/18/2020    ALKPHOS 100 02/18/2020    EGFR 24 02/18/2020   ,   PT/INR: No results found for: PT, INR,   Magnesium: No components found for: MAG,  Phosphorous: No results found for: PHOS    Microbiology:  @LABRCNTIP,(urinecx:7)@        Nellene Pipes, CRNP    Portions of the record may have been created with voice recognition software  Occasional wrong word or "sound a like" substitutions may have occurred due to the inherent limitations of voice recognition software  Read the chart carefully and recognize, using context, where substitutions have occurred

## 2020-02-18 NOTE — PLAN OF CARE
Problem: PHYSICAL THERAPY ADULT  Goal: Performs mobility at highest level of function for planned discharge setting  See evaluation for individualized goals  Description  Treatment/Interventions: Functional transfer training, LE strengthening/ROM, Elevations, Therapeutic exercise, Endurance training, Patient/family training, Equipment eval/education, Bed mobility, Gait training, Spoke to nursing, Spoke to case management  Equipment Recommended: (continue RW use)       See flowsheet documentation for full assessment, interventions and recommendations  Outcome: Progressing  Note:   Prognosis: Fair  Problem List: Decreased strength, Decreased endurance, Impaired balance, Decreased mobility, Impaired hearing, Pain, Decreased safety awareness  Assessment: Pt seen for PT treatment session this date with interventions consisting of gait training w/ emphasis on improving pt's ability to ambulate level surfaces x 25 feet x 2 with min A provided by therapist with RW and therapeutic activity consisting of training: bed mobility, supine<>sit transfers, sit<>stand transfers, static sitting tolerance at EOB for 5 minutes w/ B UE support, static standing tolerance for 3 minutes w/ B UE support, vc and tactile cues for static sitting posture faciliation, vc and tactile cues for static standing posture faciliation, stand pivot transfers towards B direction and toileting on standard toilet w/max assistance for janneth care  Pt agreeable to PT treatment session upon arrival, pt found supine in bed w/ HOB elevated, in no apparent distress, A&O x 4 and reported feeling fatigued and "weak"  In comparison to previous session, pt with improvements in ambulatory progression  Post session: chair alarm engaged, all needs in reach and RN notified of session findings/recommendations Continue to recommend STR at time of d/c in order to maximize pt's functional independence and safety w/ mobility   Pt continues to be functioning below baseline level, and remains limited 2* factors listed above and including weakness,impaired balance, decreased endurance, activity tolerance, gait dysfunction, and decline from PLOF  PT will continue to see pt while here in order to address the deficits listed above and provide interventions consistent w/ POC in effort to achieve LTGs  Barriers to Discharge: Inaccessible home environment     Recommendation: Short-term skilled PT     PT - OK to Discharge: Yes(if medically stable to STR)    See flowsheet documentation for full assessment

## 2020-02-18 NOTE — PROGRESS NOTES
Progress Note - General Surgery   Jessy Mcgraw [de-identified] y o  male MRN: [id number]  Unit/Bed#: -01 Encounter: [id number]    Assessment:  POD 11 Open cholecystectomy  Day 7 of 10 treatment for C  Diff  Stools reported as firmer  Eating well  Not much progress with Physical Therapy  Does not want current home health agency ([address] [place] [address]) upon discharge, but does have a preference to go home with [facility] rather than placement at a rehab facility  Pathology report with neuroendocrine tumor in the cholecystectomy specimen  Plan: Add Cholestyramine  Continue PO Vancomycin to complete a 10 day course  Medical Oncology follow up upon discharge  Remove skin clips from RUQ incision  Subjective/Objective   Chief Complaint: Diarrhea, weakness and gait instability    Subjective: Appears stronger and more alert day by day  Objective:     Blood pressure 150/68, pulse 65, temperature 97 6 °F (36 4 °C), temperature source Temporal, resp  rate 18, height 5' 8" (1 727 m), weight 72 9 kg (160 lb 11 5 oz), SpO2 100 %  ,Body mass index is 24 44 kg/m²  Intake/Output Summary (Last 24 hours) at 2/18/2020 1127  Last data filed at 2/18/2020 1026  Gross per 24 hour   Intake 1260 ml   Output 2050 ml   Net -790 ml       Invasive Devices     Peripheral Intravenous Line            Peripheral IV 02/16/20 Left;Ventral (anterior) Wrist 2 days          Drain            Urethral Catheter 18 Fr  56 days                Physical Exam:   Abdomen soft, incisions clean and dry  Skin clips out from RUQ Kocher incision, healing uneventfully    Lab, Imaging and other studies:  I have personally reviewed pertinent lab results    , CBC:   Lab Results   Component Value Date    WBC 10 30 02/18/2020    HGB 10 6 (L) 02/18/2020    HCT 31 9 (L) 02/18/2020    MCV 92 02/18/2020     02/18/2020    MCH 30 4 02/18/2020    MCHC 33 1 02/18/2020    RDW 16 1 (H) 02/18/2020    MPV 8 1 (L) 02/18/2020   , CMP:   Lab Results   Component Value Date    SODIUM 140 02/18/2020    K 4 0 02/18/2020     02/18/2020    CO2 26 02/18/2020    BUN 18 02/18/2020    CREATININE 2 43 (H) 02/18/2020    CALCIUM 8 4 (L) 02/18/2020    AST 12 (L) 02/18/2020    ALT 9 02/18/2020    ALKPHOS 100 02/18/2020    EGFR 24 02/18/2020     VTE Pharmacologic Prophylaxis: Heparin  VTE Mechanical Prophylaxis: sequential compression device

## 2020-02-18 NOTE — SOCIAL WORK
I placed a call to Betsey Smith @ 11:03 am, pt was driving and she asked me to call back in one hour to discuss d/c plan and needs

## 2020-02-18 NOTE — SOCIAL WORK
Fawadthalia Bassett wants the pt home with [facility], she plans to drive the pt home, she has declined snf rehab and the pt has also declined rehab, cm will continue to follow

## 2020-02-19 LAB
ALBUMIN SERPL BCP-MCNC: 2.8 G/DL (ref 3.5–5.7)
ALP SERPL-CCNC: 93 U/L (ref 55–165)
ALT SERPL W P-5'-P-CCNC: 7 U/L (ref 7–52)
ANION GAP SERPL CALCULATED.3IONS-SCNC: 8 MMOL/L (ref 4–13)
AST SERPL W P-5'-P-CCNC: 11 U/L (ref 13–39)
BILIRUB SERPL-MCNC: 0.4 MG/DL (ref 0.2–1)
BUN SERPL-MCNC: 17 MG/DL (ref 7–25)
CALCIUM SERPL-MCNC: 8.1 MG/DL (ref 8.6–10.5)
CHLORIDE SERPL-SCNC: 108 MMOL/L (ref 98–107)
CO2 SERPL-SCNC: 24 MMOL/L (ref 21–31)
CREAT SERPL-MCNC: 2.09 MG/DL (ref 0.7–1.3)
GFR SERPL CREATININE-BSD FRML MDRD: 29 ML/MIN/1.73SQ M
GLUCOSE SERPL-MCNC: 153 MG/DL (ref 65–140)
GLUCOSE SERPL-MCNC: 159 MG/DL (ref 65–140)
GLUCOSE SERPL-MCNC: 176 MG/DL (ref 65–140)
GLUCOSE SERPL-MCNC: 62 MG/DL (ref 65–140)
GLUCOSE SERPL-MCNC: 65 MG/DL (ref 65–99)
MAGNESIUM SERPL-MCNC: 1.6 MG/DL (ref 1.9–2.7)
POTASSIUM SERPL-SCNC: 4.3 MMOL/L (ref 3.5–5.5)
PROT SERPL-MCNC: 5.5 G/DL (ref 6.4–8.9)
SODIUM SERPL-SCNC: 140 MMOL/L (ref 134–143)

## 2020-02-19 PROCEDURE — 97530 THERAPEUTIC ACTIVITIES: CPT

## 2020-02-19 PROCEDURE — 83735 ASSAY OF MAGNESIUM: CPT | Performed by: NURSE PRACTITIONER

## 2020-02-19 PROCEDURE — NC001 PR NO CHARGE: Performed by: SPECIALIST

## 2020-02-19 PROCEDURE — 97110 THERAPEUTIC EXERCISES: CPT

## 2020-02-19 PROCEDURE — 80053 COMPREHEN METABOLIC PANEL: CPT | Performed by: NURSE PRACTITIONER

## 2020-02-19 PROCEDURE — 82948 REAGENT STRIP/BLOOD GLUCOSE: CPT

## 2020-02-19 PROCEDURE — 99233 SBSQ HOSP IP/OBS HIGH 50: CPT | Performed by: INTERNAL MEDICINE

## 2020-02-19 PROCEDURE — 97116 GAIT TRAINING THERAPY: CPT

## 2020-02-19 PROCEDURE — 99024 POSTOP FOLLOW-UP VISIT: CPT | Performed by: SPECIALIST

## 2020-02-19 RX ORDER — COLCHICINE 0.6 MG/1
0.3 TABLET ORAL DAILY
Qty: 15 TABLET | Refills: 2 | Status: ON HOLD | OUTPATIENT
Start: 2020-02-20 | End: 2020-01-01

## 2020-02-19 RX ORDER — MAGNESIUM SULFATE HEPTAHYDRATE 40 MG/ML
2 INJECTION, SOLUTION INTRAVENOUS ONCE
Status: DISCONTINUED | OUTPATIENT
Start: 2020-02-19 | End: 2020-02-19

## 2020-02-19 RX ORDER — CHOLESTYRAMINE LIGHT 4 G/5.7G
4 POWDER, FOR SUSPENSION ORAL 2 TIMES DAILY
Qty: 60 PACKET | Refills: 1 | Status: SHIPPED | OUTPATIENT
Start: 2020-02-20 | End: 2020-03-08 | Stop reason: HOSPADM

## 2020-02-19 RX ADMIN — SIMETHICONE CHEW TAB 80 MG 80 MG: 80 TABLET ORAL at 22:30

## 2020-02-19 RX ADMIN — HEPARIN SODIUM 5000 UNITS: 5000 INJECTION INTRAVENOUS; SUBCUTANEOUS at 22:30

## 2020-02-19 RX ADMIN — INSULIN LISPRO 1 UNITS: 100 INJECTION, SOLUTION INTRAVENOUS; SUBCUTANEOUS at 22:28

## 2020-02-19 RX ADMIN — PANTOPRAZOLE SODIUM 40 MG: 40 TABLET, DELAYED RELEASE ORAL at 06:12

## 2020-02-19 RX ADMIN — MAGNESIUM SULFATE HEPTAHYDRATE 2 G: 500 INJECTION, SOLUTION INTRAMUSCULAR; INTRAVENOUS at 10:28

## 2020-02-19 RX ADMIN — HYDRALAZINE HYDROCHLORIDE 50 MG: 25 TABLET, FILM COATED ORAL at 06:12

## 2020-02-19 RX ADMIN — VANCOMYCIN HYDROCHLORIDE 125 MG: 500 INJECTION, POWDER, LYOPHILIZED, FOR SOLUTION INTRAVENOUS at 00:31

## 2020-02-19 RX ADMIN — COLCHICINE 0.3 MG: 0.6 TABLET, FILM COATED ORAL at 10:27

## 2020-02-19 RX ADMIN — SIMETHICONE CHEW TAB 80 MG 80 MG: 80 TABLET ORAL at 06:12

## 2020-02-19 RX ADMIN — VANCOMYCIN HYDROCHLORIDE 125 MG: 500 INJECTION, POWDER, LYOPHILIZED, FOR SOLUTION INTRAVENOUS at 12:30

## 2020-02-19 RX ADMIN — AMLODIPINE BESYLATE 10 MG: 5 TABLET ORAL at 10:27

## 2020-02-19 RX ADMIN — VANCOMYCIN HYDROCHLORIDE 125 MG: 500 INJECTION, POWDER, LYOPHILIZED, FOR SOLUTION INTRAVENOUS at 06:12

## 2020-02-19 RX ADMIN — HYDRALAZINE HYDROCHLORIDE 50 MG: 25 TABLET, FILM COATED ORAL at 14:11

## 2020-02-19 RX ADMIN — Medication 1 CAPSULE: at 17:41

## 2020-02-19 RX ADMIN — HEPARIN SODIUM 5000 UNITS: 5000 INJECTION INTRAVENOUS; SUBCUTANEOUS at 14:11

## 2020-02-19 RX ADMIN — INSULIN GLARGINE 15 UNITS: 100 INJECTION, SOLUTION SUBCUTANEOUS at 22:28

## 2020-02-19 RX ADMIN — VITAMIN D, TAB 1000IU (100/BT) 1000 UNITS: 25 TAB at 10:26

## 2020-02-19 RX ADMIN — HEPARIN SODIUM 5000 UNITS: 5000 INJECTION INTRAVENOUS; SUBCUTANEOUS at 06:12

## 2020-02-19 RX ADMIN — SIMETHICONE CHEW TAB 80 MG 80 MG: 80 TABLET ORAL at 13:27

## 2020-02-19 RX ADMIN — VANCOMYCIN HYDROCHLORIDE 125 MG: 500 INJECTION, POWDER, LYOPHILIZED, FOR SOLUTION INTRAVENOUS at 17:44

## 2020-02-19 RX ADMIN — CHOLESTYRAMINE 4 G: 4 POWDER, FOR SUSPENSION ORAL at 10:26

## 2020-02-19 RX ADMIN — TAMSULOSIN HYDROCHLORIDE 0.4 MG: 0.4 CAPSULE ORAL at 17:41

## 2020-02-19 RX ADMIN — HYDRALAZINE HYDROCHLORIDE 50 MG: 25 TABLET, FILM COATED ORAL at 22:30

## 2020-02-19 NOTE — PHYSICAL THERAPY NOTE
02/19/20 1338   Pain Assessment   Pain Assessment No/denies pain   Restrictions/Precautions   Weight Bearing Precautions Per Order No   Other Precautions Contact/isolation; Bed Alarm; Chair Alarm;Multiple lines; Fall Risk;Pain;Hard of hearing   General   Chart Reviewed Yes   Response to Previous Treatment Patient with no complaints from previous session  Family/Caregiver Present Yes   Cognition   Overall Cognitive Status WFL   Arousal/Participation Alert; Cooperative   Attention Attends with cues to redirect   Orientation Level Oriented X4   Memory Decreased recall of precautions;Decreased recall of recent events   Following Commands Follows one step commands without difficulty   Comments pt agreeable to PT session   Bed Mobility   Rolling L 4  Minimal assistance   Additional items Assist x 1;Bedrails;Verbal cues;LE management   Supine to Sit 4  Minimal assistance   Additional items Assist x 1;HOB elevated; Bedrails; Increased time required;Verbal cues;LE management   Sit to Supine 4  Minimal assistance   Additional items Assist x 1;HOB elevated; Bedrails; Increased time required;Verbal cues;LE management   Transfers   Sit to Stand 4  Minimal assistance  (CGA)   Additional items Assist x 1;Bedrails; Increased time required;Verbal cues   Stand to Sit 4  Minimal assistance   Additional items Assist x 1; Armrests; Verbal cues; Increased time required   Toilet transfer 3  Moderate assistance  (assistance required for posterior pericare)   Additional items Assist x 1; Armrests; Increased time required;Verbal cues;Standard toilet   Ambulation/Elevation   Gait pattern Improper Weight shift;Decreased foot clearance;Shuffling; Short stride   Gait Assistance 4  Minimal assist   Additional items Assist x 1;Verbal cues   Assistive Device Rolling walker   Distance 10ftx4   Balance   Static Sitting Good   Dynamic Sitting Fair +   Static Standing Fair -   Dynamic Standing Poor +   Ambulatory Poor +   Endurance Deficit   Endurance Deficit Yes   Activity Tolerance   Activity Tolerance Patient limited by fatigue   Exercises   Quad Sets Supine;25 reps;AROM; Bilateral   Heelslides Supine;25 reps;AROM; Bilateral   Glute Sets Supine;25 reps;AROM; Bilateral   Hip Flexion Supine;25 reps;AROM; Bilateral   Hip Abduction Supine;25 reps;AROM; Bilateral   Hip Adduction Supine;25 reps;AROM; Bilateral   Knee AROM Short Arc Quad Supine;25 reps;AROM; Bilateral   Ankle Pumps Supine;25 reps;AROM; Bilateral   Bridging Supine;5 reps;AROM   Assessment   Prognosis Fair   Problem List Decreased strength;Decreased endurance; Impaired balance;Decreased mobility; Decreased safety awareness;Pain   Assessment Pt seen for PT treatment session this date with interventions consisting of gait training w/ emphasis on improving pt's ability to ambulate level surfaces x 10ftx4 with min A provided by therapist with RW, Therapeutic exercise consisting of: AROM 25x B LE in supine position and therapeutic activity consisting of training: bed mobility, supine<>sit transfers, sit<>stand transfers, vc and tactile cues for static sitting posture faciliation, vc and tactile cues for static standing posture faciliation and toilet transfers  Pt agreeable to PT treatment session upon arrival, pt found supine in bed w/ HOB elevated, in no apparent distress and responsive  In comparison to previous session, pt with improvements in distance ambulated  Post session: pt returned BTB, bed alarm engaged and all needs in reach Continue to recommend Home PT and home with family support 24/7 at time of d/c in order to maximize pt's functional independence and safety w/ mobility  Pt continues to be functioning below baseline level, and remains limited 2* factors listed above and including decreased functional mobility, and decreased activity tolerance  PT will continue to see pt while here in order to address the deficits listed above and provide interventions consistent w/ POC in effort to achieve STGs     Barriers to Discharge Inaccessible home environment   Goals   Patient Goals to return home   PT Treatment Day 1   Plan   Treatment/Interventions Functional transfer training;LE strengthening/ROM; Therapeutic exercise; Endurance training;Bed mobility;Gait training   Progress Slow progress, decreased activity tolerance   PT Frequency   (3-5x/wk)   Recommendation   Recommendation Home PT; Home with family support   Equipment Recommended Walker   Additional Comments upon conclusion, pt returned to supine in bed all needs in reach

## 2020-02-19 NOTE — PROGRESS NOTES
Progress Note - Nephrology   Mike Leon [de-identified] y o  male MRN: [id number]  Unit/Bed#: -01 Encounter: [id number]    A/P:  1  Acute kidney injury atop chronic kidney disease   Creatinine improved over last 24 hours now down to 2 09 mg/dL, continue avoid nephrotoxins  2  Chronic kidney disease stage 3 baseline creatinine between 1 4-1 5 mg/dL  3  Diabetic nephropathy  4  Hypomagnesemia   Will give the patient 1 time dose of 2 g magnesium sulfate IV  Continue monitor follow patient's electrolytes closely  5  Metabolic acidosis   Sodium bicarbonate tablets currently on hold, continue monitor for now  May continue to hold in not given the outpatient setting should his bicarb remains appropriate  6  Cholecystitis status post cholecystectomy postop day 12   7  Neuroendocrine carcinoma with metastases   Continue with injections of lancreotide injections once a month      Follow up reason for today's visit:  Acute kidney injury/chronic kidney disease    Acute cholecystitis    Patient Active Problem List   Diagnosis    Weakness generalized    Type 2 diabetes 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, with long-term current use of insulin ([address] )    Essential hypertension    Mixed hyperlipidemia    Cardiac disease    Acute renal failure superimposed on stage 3 chronic kidney disease (HCC)    Hydroureter    Metabolic acidosis    Iron deficiency anemia secondary to inadequate dietary iron intake    Accelerated hypertension    Liver mass    Neuroendocrine tumor    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)    Pressure injury of right heel, unstageable ([address] )    Atherosclerosis of artery of extremity with ulceration (HCC)    Carcinoid syndrome (HCC)    Chronic indwelling Haile catheter    Moderate protein-calorie malnutrition (HCC)    Biliary sludge    Malignant neuroendocrine neoplasm ([address] )    Urinary retention    Cholecystostomy tube dysfunction    Acute pancreatitis without infection or necrosis    UTI due to extended-spectrum beta lactamase (ESBL) producing Escherichia coli    CKD (chronic kidney disease) stage 3, GFR 30-59 ml/min (HCC)    Hypomagnesemia    Anemia    Acute cholecystitis    Shortness of breath    Goals of care, counseling/discussion    Hypernatremia         Subjective:   No acute events overnight, patient is eating and drinking appropriately  Objective:     Vitals: Blood pressure 118/58, pulse 77, temperature 97 7 °F (36 5 °C), temperature source Temporal, resp  rate 18, height 5' 8" (1 727 m), weight 72 9 kg (160 lb 11 5 oz), SpO2 98 %  ,Body mass index is 24 44 kg/m²  Weight (last 2 days)     Date/Time   Weight    02/18/20 0535   72 9 (160 72)    02/17/20 0600   71 3 (157 19)                Intake/Output Summary (Last 24 hours) at 2/19/2020 0847  Last data filed at 2/19/2020 0701  Gross per 24 hour   Intake 800 ml   Output 1275 ml   Net -475 ml     I/O last 3 completed shifts: In: 800 [P O :800]  Out: 1350 [Urine:1350]    Urethral Catheter 18 Fr   (Active)   Reasons to continue Urinary Catheter  Chronic urinary catheter 2/18/2020 11:01 AM   Goal for Removal N/A- chronic garcia 2/18/2020 11:01 AM   Site Assessment Clean;Skin intact 2/18/2020 11:01 AM   Collection Container Standard drainage bag 2/18/2020 11:01 AM   Securement Method Securing device (Describe) 2/18/2020 11:01 AM   Output (mL) 625 mL 2/19/2020  7:01 AM       Physical Exam: /58 (BP Location: Left arm)   Pulse 77   Temp 97 7 °F (36 5 °C) (Temporal)   Resp 18   Ht 5' 8" (1 727 m)   Wt 72 9 kg (160 lb 11 5 oz)   SpO2 98%   BMI 24 44 kg/m²     General Appearance:    Alert, cooperative, no distress, appears stated age   Head:    Normocephalic, without obvious abnormality, atraumatic   Eyes:    Conjunctiva/corneas clear   Ears:    Normal external ears   Nose:   Nares normal, septum midline, mucosa normal, no drainage    or sinus tenderness   Throat:   Lips, mucosa, and tongue normal; teeth and gums normal   Neck:   Supple   Back:     Symmetric, no curvature, ROM normal, no CVA tenderness   Lungs:     Clear to auscultation bilaterally, respirations unlabored   Chest wall:    No tenderness or deformity   Heart:    Regular rate and rhythm, S1 and S2 normal, no murmur, rub   or gallop   Abdomen:     Soft, non-tender, bowel sounds active   Extremities:   Extremities normal, atraumatic, no cyanosis or edema   Skin:   Skin color, texture, turgor normal, no rashes or lesions   Lymph nodes:   Cervical normal   Neurologic:   CNII-XII intact            Lab, Imaging and other studies: I have personally reviewed pertinent labs  CBC: No results found for: WBC, HGB, HCT, MCV, PLT, ADJUSTEDWBC, MCH, MCHC, RDW, MPV, NRBC  CMP:   Lab Results   Component Value Date    K 4 3 02/19/2020     (H) 02/19/2020    CO2 24 02/19/2020    BUN 17 02/19/2020    CREATININE 2 09 (H) 02/19/2020    CALCIUM 8 1 (L) 02/19/2020    AST 11 (L) 02/19/2020    ALT 7 02/19/2020    ALKPHOS 93 02/19/2020    EGFR 29 02/19/2020         Results from last 7 days   Lab Units 02/19/20  0603 02/18/20  0526 02/17/20  0506   POTASSIUM mmol/L 4 3 4 0 4 0   CHLORIDE mmol/L 108* 106 107   CO2 mmol/L 24 26 25   BUN mg/dL 17 18 17   CREATININE mg/dL 2 09* 2 43* 2 31*   CALCIUM mg/dL 8 1* 8 4* 8 1*   ALK PHOS U/L 93 100  --    ALT U/L 7 9  --    AST U/L 11* 12*  --          Phosphorus: No results found for: PHOS  Magnesium:   Lab Results   Component Value Date    MG 1 6 (L) 02/19/2020     Urinalysis: No results found for: COLORU, CLARITYU, SPECGRAV, PHUR, LEUKOCYTESUR, NITRITE, PROTEINUA, GLUCOSEU, KETONESU, BILIRUBINUR, BLOODU  Ionized Calcium: No results found for: CAION  Coagulation: No results found for: PT, INR, APTT  Troponin: No results found for: TROPONINI  ABG: No results found for: PHART, SFJ9SMR, PO2ART, BEF4RLB, A6DJWFVR, BEART, SOURCE  Radiology review:     IMAGING  No results found      Current Facility-Administered Medications   Medication Dose Route Frequency    acetaminophen (TYLENOL) tablet 650 mg  650 mg Oral Q6H PRN    amLODIPine (NORVASC) tablet 10 mg  10 mg Oral Daily    b complex-vitamin C-folic acid (NEPHROCAPS) capsule 1 capsule  1 capsule Oral Daily With Dinner    cholecalciferol (VITAMIN D3) tablet 1,000 Units  1,000 Units Oral Daily    cholestyramine sugar free (QUESTRAN LIGHT) packet 4 g  4 g Oral BID    cloNIDine (CATAPRES) tablet 0 1 mg  0 1 mg Oral Q3H PRN    colchicine (COLCRYS) tablet 0 3 mg  0 3 mg Oral Daily    heparin (porcine) subcutaneous injection 5,000 Units  5,000 Units Subcutaneous Q8H [facility]    hydrALAZINE (APRESOLINE) tablet 50 mg  50 mg Oral Q8H [facility]    HYDROmorphone (DILAUDID) injection 0 5 mg  0 5 mg Intravenous Q4H PRN    insulin glargine (LANTUS) subcutaneous injection 15 Units 0 15 mL  15 Units Subcutaneous HS    insulin lispro (HumaLOG) 100 units/mL subcutaneous injection 1-5 Units  1-5 Units Subcutaneous TID AC    insulin lispro (HumaLOG) 100 units/mL subcutaneous injection 1-5 Units  1-5 Units Subcutaneous HS    magnesium sulfate 2 g in sodium chloride 0 9 % 100 mL IVPB  2 g Intravenous Once    ondansetron (ZOFRAN) injection 4 mg  4 mg Intravenous Q6H PRN    oxyCODONE-acetaminophen (PERCOCET) 5-325 mg per tablet 1 tablet  1 tablet Oral Q4H PRN    oxyCODONE-acetaminophen (PERCOCET) 5-325 mg per tablet 2 tablet  2 tablet Oral Q4H PRN    pantoprazole (PROTONIX) EC tablet 40 mg  40 mg Oral Early Morning    simethicone (MYLICON) chewable tablet 80 mg  80 mg Oral Q6H PRN    sodium chloride (PF) 0 9 % injection 10 mL  10 mL Intravenous Once per day on Mon Thu    tamsulosin (FLOMAX) capsule 0 4 mg  0 4 mg Oral Daily With Dinner    vancomycin (VANCOCIN) oral solution 125 mg  125 mg Oral Q6H [facility]     Medications Discontinued During This Encounter   Medication Reason    morphine injection 2 mg     sodium chloride (PF) 0 9 % injection 10 mL     bupivacaine-epinephrine (PF) (MARCAINE/EPINEPHRINE PF) 0 25 %-1:[id number] injection Patient Discharge    iohexol (OMNIPAQUE) 300 mg/mL injection Patient Discharge    sodium chloride 0 9 % irrigation solution Patient Discharge    sodium chloride 0 9 % irrigation solution Patient Discharge    morphine injection 2 mg     HYDROmorphone (DILAUDID) injection 0 4 mg Patient Transfer    sodium chloride 0 9 % infusion     piperacillin-tazobactam (ZOSYN) 3 375 g in sodium chloride 0 9 % 100 mL IVPB     sodium chloride infusion 0 45 %     colchicine (COLCRYS) tablet 0 6 mg     piperacillin-tazobactam (ZOSYN) 2 25 g in sodium chloride 0 9 % 50 mL IVPB     docusate sodium (COLACE) capsule 100 mg     multi-electrolyte (PLASMALYTE-A/ISOLYTE-S PH 7 4) IV solution     furosemide (LASIX) injection 20 mg     piperacillin-tazobactam (ZOSYN) 2 25 g in sodium chloride 0 9 % 50 mL IVPB     docusate sodium (COLACE) capsule 100 mg     simethicone (MYLICON) chewable tablet 80 mg Duplicate order    amLODIPine (NORVASC) tablet 10 mg     metroNIDAZOLE (FLAGYL) tablet 500 mg     sodium bicarbonate tablet 650 mg     hydrALAZINE (APRESOLINE) tablet 25 mg     magnesium sulfate 2 g/50 mL IVPB (premix) 2 g        Erasto Berumne, DO      This progress note was produced in part using a dictation device which may document imprecise wording from author's original intent

## 2020-02-19 NOTE — PLAN OF CARE
Problem: Potential for Falls  Goal: Patient will remain free of falls  Description  INTERVENTIONS:  - Assess patient frequently for physical needs  -  Identify cognitive and physical deficits and behaviors that affect risk of falls    -  [place] fall precautions as indicated by assessment   - Educate patient/family on patient safety including physical limitations  - Instruct patient to call for assistance with activity based on assessment  - Modify environment to reduce risk of injury  - Consider OT/PT consult to assist with strengthening/mobility  Outcome: Progressing     Problem: PAIN - ADULT  Goal: Verbalizes/displays adequate comfort level or baseline comfort level  Description  Interventions:  - Encourage patient to monitor pain and request assistance  - Assess pain using appropriate pain scale  - Administer analgesics based on type and severity of pain and evaluate response  - Implement non-pharmacological measures as appropriate and evaluate response  - Consider cultural and social influences on pain and pain management  - Notify physician/advanced practitioner if interventions unsuccessful or patient reports new pain  Outcome: Progressing     Problem: INFECTION - ADULT  Goal: Absence or prevention of progression during hospitalization  Description  INTERVENTIONS:  - Assess and monitor for signs and symptoms of infection  - Monitor lab/diagnostic results  - Monitor all insertion sites, i e  indwelling lines, tubes, and drains  - Monitor endotracheal if appropriate and nasal secretions for changes in amount and color  - [place] appropriate cooling/warming therapies per order  - Administer medications as ordered  - Instruct and encourage patient and family to use good hand hygiene technique  - Identify and instruct in appropriate isolation precautions for identified infection/condition  Outcome: Progressing     Problem: SAFETY ADULT  Goal: Patient will remain free of falls  Description  INTERVENTIONS:  - Assess patient frequently for physical needs  -  Identify cognitive and physical deficits and behaviors that affect risk of falls    -  [place] fall precautions as indicated by assessment   - Educate patient/family on patient safety including physical limitations  - Instruct patient to call for assistance with activity based on assessment  - Modify environment to reduce risk of injury  - Consider OT/PT consult to assist with strengthening/mobility  Outcome: Progressing  Goal: Maintain or return to baseline ADL function  Description  INTERVENTIONS:  -  Assess patient's ability to carry out ADLs; assess patient's baseline for ADL function and identify physical deficits which impact ability to perform ADLs (bathing, care of mouth/teeth, toileting, grooming, dressing, etc )  - Assess/evaluate cause of self-care deficits   - Assess range of motion  - Assess patient's mobility; develop plan if impaired  - Assess patient's need for assistive devices and provide as appropriate  - Encourage maximum independence but intervene and supervise when necessary  - Involve family in performance of ADLs  - Assess for home care needs following discharge   - Consider OT consult to assist with ADL evaluation and planning for discharge  - Provide patient education as appropriate  Outcome: Progressing  Goal: Maintain or return mobility status to optimal level  Description  INTERVENTIONS:  - Assess patient's baseline mobility status (ambulation, transfers, stairs, etc )    - Identify cognitive and physical deficits and behaviors that affect mobility  - Identify mobility aids required to assist with transfers and/or ambulation (gait belt, sit-to-stand, lift, walker, cane, etc )  - [place] fall precautions as indicated by assessment  - Record patient progress and toleration of activity level on Mobility SBAR; progress patient to next Phase/Stage  - Instruct patient to call for assistance with activity based on assessment  - Consider rehabilitation consult to assist with strengthening/weightbearing, etc   Outcome: Progressing     Problem: DISCHARGE PLANNING  Goal: Discharge to home or other facility with appropriate resources  Description  INTERVENTIONS:  - Identify barriers to discharge w/patient and caregiver  - Arrange for needed discharge resources and transportation as appropriate  - Identify discharge learning needs (meds, wound care, etc )  - Refer to Case Management Department for coordinating discharge planning if the patient needs post-hospital services based on physician/advanced practitioner order or complex needs related to functional status, cognitive ability, or social support system   Outcome: Progressing     Problem: Knowledge Deficit  Goal: Patient/family/caregiver demonstrates understanding of disease process, treatment plan, medications, and discharge instructions  Description  Complete learning assessment and assess knowledge base    Interventions:  - Provide teaching at level of understanding  - Provide teaching via preferred learning methods  Outcome: Progressing     Problem: Prexisting or High Potential for Compromised Skin Integrity  Goal: Skin integrity is maintained or improved  Description  INTERVENTIONS:  - Identify patients at risk for skin breakdown  - Assess and monitor skin integrity  - Assess and monitor nutrition and hydration status  - Monitor labs   - Assess for incontinence   - Turn and reposition patient  - Assist with mobility/ambulation  - Relieve pressure over bony prominences  - Avoid friction and shearing  - Provide appropriate hygiene as needed including keeping skin clean and dry  - Evaluate need for skin moisturizer/barrier cream  - Collaborate with interdisciplinary team   - Patient/family teaching  - Consider wound care consult   Outcome: Progressing     Problem: DISCHARGE PLANNING - CARE MANAGEMENT  Goal: Discharge to post-acute care or home with appropriate resources  Description  INTERVENTIONS:  - Conduct assessment to determine patient/family and health care team treatment goals, and need for post-acute services based on payer coverage, community resources, and patient preferences, and barriers to discharge  - Address psychosocial, clinical, and financial barriers to discharge as identified in assessment in conjunction with the patient/family and health care team  - Arrange appropriate level of post-acute services according to patient's   needs and preference and payer coverage in collaboration with the physician and health care team  - Communicate with and update the patient/family, physician, and health care team regarding progress on the discharge plan  - Arrange appropriate transportation to post-acute venues    Need to reassess    Outcome: Progressing     Problem: Nutrition/Hydration-ADULT  Goal: Nutrient/Hydration intake appropriate for improving, restoring or maintaining nutritional needs  Description  Monitor and assess patient's nutrition/hydration status for malnutrition  Collaborate with interdisciplinary team and initiate plan and interventions as ordered  Monitor patient's weight and dietary intake as ordered or per policy  Utilize nutrition screening tool and intervene as necessary  Determine patient's food preferences and provide high-protein, high-caloric foods as appropriate       INTERVENTIONS:  - Monitor oral intake, urinary output, labs, and treatment plans  - Assess nutrition and hydration status and recommend course of action  - Evaluate amount of meals eaten  - Assist patient with eating if necessary   - Allow adequate time for meals  - Recommend/ encourage appropriate diets, oral nutritional supplements, and vitamin/mineral supplements  - Order, calculate, and assess calorie counts as needed  - Recommend, monitor, and adjust tube feedings and TPN/PPN based on assessed needs  - Assess need for intravenous fluids  - Provide specific nutrition/hydration education as appropriate  - Include patient/family/caregiver in decisions related to nutrition  Outcome: Progressing

## 2020-02-19 NOTE — ASSESSMENT & PLAN NOTE
Lab Results   Component Value Date    HGBA1C 8 1 (H) 06/14/2019       Recent Labs     02/18/20 2014 02/19/20  0636 02/19/20  1120 02/19/20  1617   POCGLU 205* 62* 153* 176*       Blood Sugar Average: Last 72 hrs:  (P) 185 5     Stable and back to baseline on current medications

## 2020-02-19 NOTE — ASSESSMENT & PLAN NOTE
POD12 open cholecystectomy for recurrent cholecystitis and cholecystostomy tube blockage    Clinically with fatigue, malaise, and resolving diarrhea  Hemodynamically stable, AFVSS  On room air  Hematologically with resolved leukocytosis, resolved SILVERIO on CKD, hypomagnesemia being replaced  Appears close to preop baseline with ongoing postop debility  Working with PT who has recommended short-term rehab  Continue cholestyramine for diarrhea  Continue Vanco to complete 10 day course as treatment for C diff  Discharge planning per Dr Todd Martins and case management

## 2020-02-19 NOTE — PLAN OF CARE
Problem: PHYSICAL THERAPY ADULT  Goal: Performs mobility at highest level of function for planned discharge setting  See evaluation for individualized goals  Description  Treatment/Interventions: Functional transfer training, LE strengthening/ROM, Elevations, Therapeutic exercise, Endurance training, Patient/family training, Equipment eval/education, Bed mobility, Gait training, Spoke to nursing, Spoke to case management  Equipment Recommended: (continue RW use)       See flowsheet documentation for full assessment, interventions and recommendations  Outcome: Progressing  Note:   Prognosis: Fair  Problem List: Decreased strength, Decreased endurance, Impaired balance, Decreased mobility, Decreased safety awareness, Pain  Assessment: Pt seen for PT treatment session this date with interventions consisting of gait training w/ emphasis on improving pt's ability to ambulate level surfaces x 10ftx4 with min A provided by therapist with RW, Therapeutic exercise consisting of: AROM 25x B LE in supine position and therapeutic activity consisting of training: bed mobility, supine<>sit transfers, sit<>stand transfers, vc and tactile cues for static sitting posture faciliation, vc and tactile cues for static standing posture faciliation and toilet transfers  Pt agreeable to PT treatment session upon arrival, pt found supine in bed w/ HOB elevated, in no apparent distress and responsive  In comparison to previous session, pt with improvements in distance ambulated  Post session: pt returned BTB, bed alarm engaged and all needs in reach Continue to recommend Home PT and home with family support 24/7 at time of d/c in order to maximize pt's functional independence and safety w/ mobility  Pt continues to be functioning below baseline level, and remains limited 2* factors listed above and including decreased functional mobility, and decreased activity tolerance   PT will continue to see pt while here in order to address the deficits listed above and provide interventions consistent w/ POC in effort to achieve STGs  Barriers to Discharge: Inaccessible home environment     Recommendation: Home PT, Home with family support     PT - OK to Discharge: Yes(if medically stable to STR)    See flowsheet documentation for full assessment

## 2020-02-19 NOTE — NUTRITION
02/19/20 1327   Recommendations/Interventions   Summary POD 12 cholecystectomy + tube blockage  Per nephrology, renal fxn has improved in the last 24 hours  Therefore, protein supplement is indicated & encouraged in light of recent surgery & h/o metastatic disease  Continue to recommend discontinuing Prosource supplement and initiating Gelatein BID for improved acceptability  Would also recommend initiating Banatrol supplement BID d/t ongoing diarrhea r/t c diff  No RD protocol at this time  Please order aforementioned supplements or allow RD protocol to self adjust  Pt has had a 2# wt decrease since intial assessment - 160# 2/18/20, 162# 2/14/20  Planned for d/c to STR when medically stable

## 2020-02-19 NOTE — ASSESSMENT & PLAN NOTE
The patient has carcinoid syndrome is currently maintained with monthly injections  Well differentiated neuroendocrine tumor was present on the final pathology, and may have precipitated the acute cholecystitis  The patient will follow up with Dr Gretta Epley as per his usual schedule

## 2020-02-19 NOTE — ASSESSMENT & PLAN NOTE
POD12 open cholecystectomy for recurrent cholecystitis and cholecystostomy tube blockage    Clinically with fatigue, malaise, and resolving diarrhea  Hemodynamically stable, AFVSS  On room air  Hematologically with resolved leukocytosis, resolved SILVERIO on CKD, hypomagnesemia being replaced  Appears close to preop baseline with ongoing postop debility  Working with PT who has recommended short-term rehab  Continue cholestyramine for diarrhea  Continue Vanco to complete 10 day course as treatment for C diff  Discharge planning per Dr Jovana Srinivasan and case management

## 2020-02-19 NOTE — DISCHARGE SUMMARY
This discharge summary is being amended to reflect the date of discharge as 02/20/2020    Discharge- Analisa Davidson 1940, [de-identified] y o  male MRN: [id number]    Unit/Bed#: -Vivien Encounter: [id number]    Primary Care Provider: Tom Morales DO   Date and time admitted to hospital: 2/5/2020 10:50 AM    Date of Discharge 2/20/2020  Discharge summary dictated evening prior, patient discharged after Urology changed his Haile Catheter on 2/20/2020    Chronic indwelling Haile catheter  Assessment & Plan  Maintain Haile catheter    Neuroendocrine tumor  Assessment & Plan  The patient has carcinoid syndrome is currently maintained with monthly injections  Well differentiated neuroendocrine tumor was present on the final pathology, and may have precipitated the acute cholecystitis  The patient will follow up with Dr Shen Singh as per his usual schedule  Acute renal failure superimposed on stage 3 chronic kidney disease ([facility] [address] )  Assessment & Plan  Acute kidney injury resolved, chronic renal insufficiency is now has stage IV and currently stable  Essential hypertension  Assessment & Plan  Stable on current medications    Type 2 diabetes 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, with long-term current use of insulin [facility])  Assessment & Plan  Lab Results   Component Value Date    HGBA1C 8 1 (H) 06/14/2019       Recent Labs     02/18/20  2014 02/19/20  0636 02/19/20  1120 02/19/20  1617   POCGLU 205* 62* 153* 176*       Blood Sugar Average: Last 72 hrs:  (P) 185 5     Stable and back to baseline on current medications    * Acute cholecystitis  Assessment & Plan  POD12 open cholecystectomy for recurrent cholecystitis and cholecystostomy tube blockage    Clinically with fatigue, malaise, and resolving diarrhea  Hemodynamically stable, AFVSS  On room air  Hematologically with resolved leukocytosis, resolved SILVERIO on CKD, hypomagnesemia being replaced  Appears close to preop baseline with ongoing postop debility  Working with PT who has recommended short-term rehab  Continue cholestyramine for diarrhea  Continue Vanco to complete 10 day course as treatment for C diff  Discharge planning per Dr Charan Zee and case management  Resolved Problems  Date Reviewed: 2/5/2020    None          Admission Date:   Admission Orders (From admission, onward)     Ordered        02/05/20 1206  Inpatient Admission  Once                     Admitting Diagnosis: Acute acalculous cholecystitis    HPI:  The patient is an 79-year-old white male with a history of metastatic neuroendocrine tumor, and well-controlled carcinoid syndrome  The patient is been treated with a percutaneous cholecystostomy for acute cholecystitis, and has been documented to have a blocked cystic duct  The patient developed recurrent acute cholecystitis, being precipitated by blockage of the cholecystostomy tube, and after outpatient replacement of the cholecystostomy tube he was admitted to the hospital due to ongoing symptoms including purulent drainage through and around the cholecystostomy tube  Although high risk for surgery, the patient was accepting of the risks and was taken to surgery on February 7th  At time of surgery he was found to have a gangrenous necrotizing process of his gallbladder with a perforated gallbladder  The cholecystectomy was completed in an open fashion, and an intraoperative cholangiogram confirmed normal biliary tree anatomy  Final pathology revealed neuroendocrine tumor involving the gallbladder, and the neck of the gallbladder and cystic duct could not be identified, presumably this was due to involvement by neuroendocrine tumor  Postoperatively the patient developed a diarrheal illness, and stool was positive for C difficile  He responded well to Flagyl, although he was ultimately better controlled with oral vancomycin, and ultimately with Cholestyramine powder      The patient's hemoglobin drifted down to a low of 8 during his hospitalization, and he was ultimately transfused with 1 unit of packed red cells, as he was symptomatic  Patient has ongoing issues with urinary retention, and has an indwelling Haile catheter  He also it is weak and debilitated and has visiting nurses and physical therapy coming into the house as an outpatient  He will need follow-up with Medical Oncology, Urology, general surgery, and Nephrology  Procedures Performed: No orders of the defined types were placed in this encounter  Laparoscopic converted to open cholecystectomy  Intraoperative cholangiogram    Summary of Hospital Course:  Please see HPI above    Significant Findings, Care, Treatment and Services Provided:   Acute gangrenous necrotizing cholecystitis  Metastatic neuroendocrine tumor  Acute kidney injury superimposed on chronic renal insufficiency  Type 2 diabetes  Chronic anemia with superimposed anemia from blood loss at surgery, a treated with transfusion of 1 unit of packed cells      Complications:  C difficile colitis    Condition at Discharge: fair         Discharge instructions/Information to patient and family:   See after visit summary for information provided to patient and family  Provisions for Follow-Up Care:  See after visit summary for information related to follow-up care and any pertinent home health orders  PCP: Oumar De La Vega DO    Disposition: Home    Planned Readmission: Yes the patient may very well require readmission for his neuroendocrine tumor, and ultimately for end of life care  Discharge Statement   I spent 30 minutes discharging the patient  This time was spent on the day of discharge  I had direct contact with the patient on the day of discharge  Additional documentation is required if more than 30 minutes were spent on discharge  Discharge Medications:  See after visit summary for reconciled discharge medications provided to patient and family

## 2020-02-19 NOTE — PROGRESS NOTES
Progress Note - Analisa Davidson 1940, [de-identified] y o  male MRN: [id number]    Unit/Bed#: -01 Encounter: [id number]    Primary Care Provider: Tom Morales DO   Date and time admitted to hospital: 2/5/2020 10:50 AM        * Acute cholecystitis  Assessment & Plan  POD12 open cholecystectomy for recurrent cholecystitis and cholecystostomy tube blockage    Clinically with fatigue, malaise, and resolving diarrhea  Hemodynamically stable, AFVSS  On room air  Hematologically with resolved leukocytosis, resolved SILVERIO on CKD, hypomagnesemia being replaced  Appears close to preop baseline with ongoing postop debility  Working with PT who has recommended short-term rehab  Continue cholestyramine for diarrhea  Continue Vanco to complete 10 day course as treatment for C diff  Discharge planning per Dr Kwasi Mccrary and case management  Subjective/Objective   Chief Complaint: "Tired"    Subjective:  Patient with ongoing fatigue and malaise  Denies pain  No nausea or vomiting  Tolerating diet  Stools becoming more formed  No fevers or chills no chest pain or dyspnea  Objective:  No overnight events documented  Blood pressure 118/58, pulse 77, temperature 97 7 °F (36 5 °C), temperature source Temporal, resp  rate 18, height 5' 8" (1 727 m), weight 72 9 kg (160 lb 11 5 oz), SpO2 98 %  ,Body mass index is 24 44 kg/m²  Intake/Output Summary (Last 24 hours) at 2/19/2020 1105  Last data filed at 2/19/2020 0701  Gross per 24 hour   Intake 300 ml   Output 625 ml   Net -325 ml       Invasive Devices     Peripheral Intravenous Line            Peripheral IV 02/16/20 Left;Ventral (anterior) Wrist 3 days          Drain            Urethral Catheter 18 Fr  57 days              Physical Exam   Constitutional: He is oriented to person, place, and time  He appears well-developed and well-nourished  No distress     Frail elderly male, appears chronically ill, fatigued   HENT:   Head: Normocephalic and atraumatic  Eyes: Pupils are equal, round, and reactive to light  Conjunctivae and EOM are normal    Cardiovascular: Normal rate and regular rhythm  Pulmonary/Chest: No respiratory distress  Lungs clear anteriorly, poor inspiratory effort, no distress  Abdominal: Soft  Bowel sounds are normal  He exhibits no distension  There is no tenderness  Soft and nontender  Incisions clean dry and intact  Musculoskeletal:   Generalized weakness   Neurological: He is alert and oriented to person, place, and time  Skin: Skin is warm and dry  Capillary refill takes less than 2 seconds  He is not diaphoretic  Psychiatric: His behavior is normal    Depressed mood and affect   Vitals reviewed  Lab, Imaging and other studies:  I have personally reviewed pertinent lab results    , CBC: No results found for: WBC, HGB, HCT, MCV, PLT, ADJUSTEDWBC, MCH, MCHC, RDW, MPV, NRBC, CMP:   Lab Results   Component Value Date    SODIUM 140 02/19/2020    K 4 3 02/19/2020     (H) 02/19/2020    CO2 24 02/19/2020    BUN 17 02/19/2020    CREATININE 2 09 (H) 02/19/2020    CALCIUM 8 1 (L) 02/19/2020    AST 11 (L) 02/19/2020    ALT 7 02/19/2020    ALKPHOS 93 02/19/2020    EGFR 29 02/19/2020     VTE Pharmacologic Prophylaxis: Heparin  VTE Mechanical Prophylaxis: sequential compression device

## 2020-02-20 VITALS
TEMPERATURE: 97.6 F | HEIGHT: 68 IN | DIASTOLIC BLOOD PRESSURE: 69 MMHG | OXYGEN SATURATION: 97 % | SYSTOLIC BLOOD PRESSURE: 142 MMHG | RESPIRATION RATE: 18 BRPM | HEART RATE: 69 BPM | BODY MASS INDEX: 23.92 KG/M2 | WEIGHT: 157.85 LBS

## 2020-02-20 LAB
GLUCOSE SERPL-MCNC: 154 MG/DL (ref 65–140)
GLUCOSE SERPL-MCNC: 229 MG/DL (ref 65–140)
GLUCOSE SERPL-MCNC: 77 MG/DL (ref 65–140)

## 2020-02-20 PROCEDURE — 97110 THERAPEUTIC EXERCISES: CPT

## 2020-02-20 PROCEDURE — 97530 THERAPEUTIC ACTIVITIES: CPT

## 2020-02-20 PROCEDURE — 82948 REAGENT STRIP/BLOOD GLUCOSE: CPT

## 2020-02-20 PROCEDURE — NC001 PR NO CHARGE: Performed by: SPECIALIST

## 2020-02-20 PROCEDURE — 99024 POSTOP FOLLOW-UP VISIT: CPT | Performed by: SPECIALIST

## 2020-02-20 PROCEDURE — 97116 GAIT TRAINING THERAPY: CPT

## 2020-02-20 RX ADMIN — INSULIN LISPRO 2 UNITS: 100 INJECTION, SOLUTION INTRAVENOUS; SUBCUTANEOUS at 16:48

## 2020-02-20 RX ADMIN — HEPARIN SODIUM 5000 UNITS: 5000 INJECTION INTRAVENOUS; SUBCUTANEOUS at 14:21

## 2020-02-20 RX ADMIN — PANTOPRAZOLE SODIUM 40 MG: 40 TABLET, DELAYED RELEASE ORAL at 06:10

## 2020-02-20 RX ADMIN — Medication 1 CAPSULE: at 16:47

## 2020-02-20 RX ADMIN — VANCOMYCIN HYDROCHLORIDE 125 MG: 500 INJECTION, POWDER, LYOPHILIZED, FOR SOLUTION INTRAVENOUS at 00:00

## 2020-02-20 RX ADMIN — VITAMIN D, TAB 1000IU (100/BT) 1000 UNITS: 25 TAB at 10:00

## 2020-02-20 RX ADMIN — HYDRALAZINE HYDROCHLORIDE 50 MG: 25 TABLET, FILM COATED ORAL at 06:10

## 2020-02-20 RX ADMIN — CHOLESTYRAMINE 4 G: 4 POWDER, FOR SUSPENSION ORAL at 10:00

## 2020-02-20 RX ADMIN — VANCOMYCIN HYDROCHLORIDE 125 MG: 500 INJECTION, POWDER, LYOPHILIZED, FOR SOLUTION INTRAVENOUS at 06:13

## 2020-02-20 RX ADMIN — AMLODIPINE BESYLATE 10 MG: 5 TABLET ORAL at 10:00

## 2020-02-20 RX ADMIN — HEPARIN SODIUM 5000 UNITS: 5000 INJECTION INTRAVENOUS; SUBCUTANEOUS at 06:10

## 2020-02-20 RX ADMIN — SIMETHICONE CHEW TAB 80 MG 80 MG: 80 TABLET ORAL at 06:21

## 2020-02-20 RX ADMIN — INSULIN LISPRO 1 UNITS: 100 INJECTION, SOLUTION INTRAVENOUS; SUBCUTANEOUS at 11:57

## 2020-02-20 RX ADMIN — VANCOMYCIN HYDROCHLORIDE 125 MG: 500 INJECTION, POWDER, LYOPHILIZED, FOR SOLUTION INTRAVENOUS at 13:00

## 2020-02-20 RX ADMIN — TAMSULOSIN HYDROCHLORIDE 0.4 MG: 0.4 CAPSULE ORAL at 16:47

## 2020-02-20 RX ADMIN — COLCHICINE 0.3 MG: 0.6 TABLET, FILM COATED ORAL at 10:00

## 2020-02-20 RX ADMIN — OXYCODONE HYDROCHLORIDE AND ACETAMINOPHEN 2 TABLET: 5; 325 TABLET ORAL at 06:50

## 2020-02-20 RX ADMIN — HYDRALAZINE HYDROCHLORIDE 50 MG: 25 TABLET, FILM COATED ORAL at 14:21

## 2020-02-20 RX ADMIN — VANCOMYCIN HYDROCHLORIDE 125 MG: 500 INJECTION, POWDER, LYOPHILIZED, FOR SOLUTION INTRAVENOUS at 17:21

## 2020-02-20 NOTE — PLAN OF CARE
Problem: PHYSICAL THERAPY ADULT  Goal: Performs mobility at highest level of function for planned discharge setting  See evaluation for individualized goals  Description  Treatment/Interventions: Functional transfer training, LE strengthening/ROM, Elevations, Therapeutic exercise, Endurance training, Patient/family training, Equipment eval/education, Bed mobility, Gait training, Spoke to nursing, Spoke to case management  Equipment Recommended: (continue RW use)       See flowsheet documentation for full assessment, interventions and recommendations  Outcome: Progressing  Note:   Prognosis: Fair  Problem List: Decreased strength, Decreased endurance, Impaired balance, Decreased mobility, Decreased safety awareness  Assessment: Pt  found resting in bed but agreeable to participate with PT treatment  Pt performed bed mobility including rolling from R side and then to L side with min Ax1  Pt  required assistance with BLE management with the use of BSR's  Pt  then transfered from supine to sit with min Ax1 with LE management  Pt  then required to sit at EOB for a long while due to reports of dizziness  Once dizziness cleared pt was able to transfer from sit to stand with min Ax1 and ambulate 10 ft with RW when reporting need to sit  Pt was assisted into stationary chair with minAx1 with RW   Pt  then performed ther ex as per treatment flow sheet x 15 reps  Pt  seemed to be self limiting during treatment  Pt  was left with all needs in reach and SCD's re-applied  Pt  would benefit from STR to improve functional mobility before returning home safely  Continue current POC and progress as tolerated  Barriers to Discharge: Inaccessible home environment     Recommendation: Short-term skilled PT     PT - OK to Discharge: Yes    See flowsheet documentation for full assessment        Crissy Manjarrez, PTA

## 2020-02-20 NOTE — PLAN OF CARE
Problem: Potential for Falls  Goal: Patient will remain free of falls  Description  INTERVENTIONS:  - Assess patient frequently for physical needs  -  Identify cognitive and physical deficits and behaviors that affect risk of falls    -  [place] fall precautions as indicated by assessment   - Educate patient/family on patient safety including physical limitations  - Instruct patient to call for assistance with activity based on assessment  - Modify environment to reduce risk of injury  - Consider OT/PT consult to assist with strengthening/mobility  Outcome: Adequate for Discharge     Problem: PAIN - ADULT  Goal: Verbalizes/displays adequate comfort level or baseline comfort level  Description  Interventions:  - Encourage patient to monitor pain and request assistance  - Assess pain using appropriate pain scale  - Administer analgesics based on type and severity of pain and evaluate response  - Implement non-pharmacological measures as appropriate and evaluate response  - Consider cultural and social influences on pain and pain management  - Notify physician/advanced practitioner if interventions unsuccessful or patient reports new pain  Outcome: Adequate for Discharge     Problem: INFECTION - ADULT  Goal: Absence or prevention of progression during hospitalization  Description  INTERVENTIONS:  - Assess and monitor for signs and symptoms of infection  - Monitor lab/diagnostic results  - Monitor all insertion sites, i e  indwelling lines, tubes, and drains  - Monitor endotracheal if appropriate and nasal secretions for changes in amount and color  - [place] appropriate cooling/warming therapies per order  - Administer medications as ordered  - Instruct and encourage patient and family to use good hand hygiene technique  - Identify and instruct in appropriate isolation precautions for identified infection/condition  Outcome: Adequate for Discharge     Problem: SAFETY ADULT  Goal: Patient will remain free of assistance with activity based on assessment  - Consider rehabilitation consult to assist with strengthening/weightbearing, etc   Outcome: Adequate for Discharge     Problem: DISCHARGE PLANNING  Goal: Discharge to home or other facility with appropriate resources  Description  INTERVENTIONS:  - Identify barriers to discharge w/patient and caregiver  - Arrange for needed discharge resources and transportation as appropriate  - Identify discharge learning needs (meds, wound care, etc )  - Refer to Case Management Department for coordinating discharge planning if the patient needs post-hospital services based on physician/advanced practitioner order or complex needs related to functional status, cognitive ability, or social support system   Outcome: Adequate for Discharge     Problem: Knowledge Deficit  Goal: Patient/family/caregiver demonstrates understanding of disease process, treatment plan, medications, and discharge instructions  Description  Complete learning assessment and assess knowledge base    Interventions:  - Provide teaching at level of understanding  - Provide teaching via preferred learning methods  Outcome: Adequate for Discharge     Problem: Prexisting or High Potential for Compromised Skin Integrity  Goal: Skin integrity is maintained or improved  Description  INTERVENTIONS:  - Identify patients at risk for skin breakdown  - Assess and monitor skin integrity  - Assess and monitor nutrition and hydration status  - Monitor labs   - Assess for incontinence   - Turn and reposition patient  - Assist with mobility/ambulation  - Relieve pressure over bony prominences  - Avoid friction and shearing  - Provide appropriate hygiene as needed including keeping skin clean and dry  - Evaluate need for skin moisturizer/barrier cream  - Collaborate with interdisciplinary team   - Patient/family teaching  - Consider wound care consult   Outcome: Adequate for Discharge     Problem: DISCHARGE PLANNING - CARE MANAGEMENT  Goal: Discharge to post-acute care or home with appropriate resources  Description  INTERVENTIONS:  - Conduct assessment to determine patient/family and health care team treatment goals, and need for post-acute services based on payer coverage, community resources, and patient preferences, and barriers to discharge  - Address psychosocial, clinical, and financial barriers to discharge as identified in assessment in conjunction with the patient/family and health care team  - Arrange appropriate level of post-acute services according to patient's   needs and preference and payer coverage in collaboration with the physician and health care team  - Communicate with and update the patient/family, physician, and health care team regarding progress on the discharge plan  - Arrange appropriate transportation to post-acute venues    Need to reassess    Outcome: Adequate for Discharge     Problem: Nutrition/Hydration-ADULT  Goal: Nutrient/Hydration intake appropriate for improving, restoring or maintaining nutritional needs  Description  Monitor and assess patient's nutrition/hydration status for malnutrition  Collaborate with interdisciplinary team and initiate plan and interventions as ordered  Monitor patient's weight and dietary intake as ordered or per policy  Utilize nutrition screening tool and intervene as necessary  Determine patient's food preferences and provide high-protein, high-caloric foods as appropriate       INTERVENTIONS:  - Monitor oral intake, urinary output, labs, and treatment plans  - Assess nutrition and hydration status and recommend course of action  - Evaluate amount of meals eaten  - Assist patient with eating if necessary   - Allow adequate time for meals  - Recommend/ encourage appropriate diets, oral nutritional supplements, and vitamin/mineral supplements  - Order, calculate, and assess calorie counts as needed  - Recommend, monitor, and adjust tube feedings and TPN/PPN based on assessed needs  - Assess need for intravenous fluids  - Provide specific nutrition/hydration education as appropriate  - Include patient/family/caregiver in decisions related to nutrition  Outcome: Adequate for Discharge

## 2020-02-20 NOTE — PROGRESS NOTES
Progress Note - General Surgery   Manav Fishman [de-identified] y o  male MRN: [id number]  Unit/Bed#: -01 Encounter: [id number]    Assessment:  POD 13 from Open cholecystectomy  Tolerating PO, Bowel habits at baseline  Day 9 of 10 day course of PO Vancomycin for C  Diff colitis  Awaiting Urology Evaluation and routine change of indwelling Haile Catheter  Plan:  Discharge to home with VNA support after Urology evaluation today  Subjective/Objective   Chief Complaint: Voices no complaints    Subjective: OOB to chair, chatting with family    Objective:     Blood pressure 166/62, pulse 67, temperature (!) 97 °F (36 1 °C), temperature source Temporal, resp  rate 18, height 5' 8" (1 727 m), weight 71 6 kg (157 lb 13 6 oz), SpO2 98 %  ,Body mass index is 24 kg/m²  Intake/Output Summary (Last 24 hours) at 2/20/2020 1108  Last data filed at 2/20/2020 0800  Gross per 24 hour   Intake 480 ml   Output 1200 ml   Net -720 ml       Invasive Devices     Peripheral Intravenous Line            Peripheral IV 02/16/20 Left;Ventral (anterior) Wrist 4 days          Drain            Urethral Catheter 18 Fr   58 days                Physical Exam:   OOB to chair  Abdomen soft, nontender  Haile catheter in place, with clear urine    Lab, Imaging and other studies:  No new labs today  VTE Pharmacologic Prophylaxis: Heparin  VTE Mechanical Prophylaxis: sequential compression device

## 2020-02-20 NOTE — PLAN OF CARE
Problem: Potential for Falls  Goal: Patient will remain free of falls  Description  INTERVENTIONS:  - Assess patient frequently for physical needs  -  Identify cognitive and physical deficits and behaviors that affect risk of falls    -  [place] fall precautions as indicated by assessment   - Educate patient/family on patient safety including physical limitations  - Instruct patient to call for assistance with activity based on assessment  - Modify environment to reduce risk of injury  - Consider OT/PT consult to assist with strengthening/mobility  Outcome: Progressing     Problem: PAIN - ADULT  Goal: Verbalizes/displays adequate comfort level or baseline comfort level  Description  Interventions:  - Encourage patient to monitor pain and request assistance  - Assess pain using appropriate pain scale  - Administer analgesics based on type and severity of pain and evaluate response  - Implement non-pharmacological measures as appropriate and evaluate response  - Consider cultural and social influences on pain and pain management  - Notify physician/advanced practitioner if interventions unsuccessful or patient reports new pain  Outcome: Progressing     Problem: INFECTION - ADULT  Goal: Absence or prevention of progression during hospitalization  Description  INTERVENTIONS:  - Assess and monitor for signs and symptoms of infection  - Monitor lab/diagnostic results  - Monitor all insertion sites, i e  indwelling lines, tubes, and drains  - Monitor endotracheal if appropriate and nasal secretions for changes in amount and color  - [place] appropriate cooling/warming therapies per order  - Administer medications as ordered  - Instruct and encourage patient and family to use good hand hygiene technique  - Identify and instruct in appropriate isolation precautions for identified infection/condition  Outcome: Progressing     Problem: SAFETY ADULT  Goal: Patient will remain free of falls  Description  INTERVENTIONS:  - Assess patient frequently for physical needs  -  Identify cognitive and physical deficits and behaviors that affect risk of falls    -  [place] fall precautions as indicated by assessment   - Educate patient/family on patient safety including physical limitations  - Instruct patient to call for assistance with activity based on assessment  - Modify environment to reduce risk of injury  - Consider OT/PT consult to assist with strengthening/mobility  Outcome: Progressing  Goal: Maintain or return to baseline ADL function  Description  INTERVENTIONS:  -  Assess patient's ability to carry out ADLs; assess patient's baseline for ADL function and identify physical deficits which impact ability to perform ADLs (bathing, care of mouth/teeth, toileting, grooming, dressing, etc )  - Assess/evaluate cause of self-care deficits   - Assess range of motion  - Assess patient's mobility; develop plan if impaired  - Assess patient's need for assistive devices and provide as appropriate  - Encourage maximum independence but intervene and supervise when necessary  - Involve family in performance of ADLs  - Assess for home care needs following discharge   - Consider OT consult to assist with ADL evaluation and planning for discharge  - Provide patient education as appropriate  Outcome: Progressing  Goal: Maintain or return mobility status to optimal level  Description  INTERVENTIONS:  - Assess patient's baseline mobility status (ambulation, transfers, stairs, etc )    - Identify cognitive and physical deficits and behaviors that affect mobility  - Identify mobility aids required to assist with transfers and/or ambulation (gait belt, sit-to-stand, lift, walker, cane, etc )  - [place] fall precautions as indicated by assessment  - Record patient progress and toleration of activity level on Mobility SBAR; progress patient to next Phase/Stage  - Instruct patient to call for assistance with activity based on assessment  - Consider rehabilitation consult to assist with strengthening/weightbearing, etc   Outcome: Progressing     Problem: DISCHARGE PLANNING  Goal: Discharge to home or other facility with appropriate resources  Description  INTERVENTIONS:  - Identify barriers to discharge w/patient and caregiver  - Arrange for needed discharge resources and transportation as appropriate  - Identify discharge learning needs (meds, wound care, etc )  - Refer to Case Management Department for coordinating discharge planning if the patient needs post-hospital services based on physician/advanced practitioner order or complex needs related to functional status, cognitive ability, or social support system   Outcome: Progressing     Problem: Knowledge Deficit  Goal: Patient/family/caregiver demonstrates understanding of disease process, treatment plan, medications, and discharge instructions  Description  Complete learning assessment and assess knowledge base    Interventions:  - Provide teaching at level of understanding  - Provide teaching via preferred learning methods  Outcome: Progressing     Problem: Prexisting or High Potential for Compromised Skin Integrity  Goal: Skin integrity is maintained or improved  Description  INTERVENTIONS:  - Identify patients at risk for skin breakdown  - Assess and monitor skin integrity  - Assess and monitor nutrition and hydration status  - Monitor labs   - Assess for incontinence   - Turn and reposition patient  - Assist with mobility/ambulation  - Relieve pressure over bony prominences  - Avoid friction and shearing  - Provide appropriate hygiene as needed including keeping skin clean and dry  - Evaluate need for skin moisturizer/barrier cream  - Collaborate with interdisciplinary team   - Patient/family teaching  - Consider wound care consult   Outcome: Progressing     Problem: Nutrition/Hydration-ADULT  Goal: Nutrient/Hydration intake appropriate for improving, restoring or maintaining nutritional needs  Description  Monitor and assess patient's nutrition/hydration status for malnutrition  Collaborate with interdisciplinary team and initiate plan and interventions as ordered  Monitor patient's weight and dietary intake as ordered or per policy  Utilize nutrition screening tool and intervene as necessary  Determine patient's food preferences and provide high-protein, high-caloric foods as appropriate       INTERVENTIONS:  - Monitor oral intake, urinary output, labs, and treatment plans  - Assess nutrition and hydration status and recommend course of action  - Evaluate amount of meals eaten  - Assist patient with eating if necessary   - Allow adequate time for meals  - Recommend/ encourage appropriate diets, oral nutritional supplements, and vitamin/mineral supplements  - Order, calculate, and assess calorie counts as needed  - Recommend, monitor, and adjust tube feedings and TPN/PPN based on assessed needs  - Assess need for intravenous fluids  - Provide specific nutrition/hydration education as appropriate  - Include patient/family/caregiver in decisions related to nutrition  Outcome: Progressing

## 2020-02-20 NOTE — NURSING NOTE
Call to Dr Tayo Kay office to see if he will be  coming over to see patient today  Office stated he is seeing last patient and he usually comes over to see patients   took information and will let him know

## 2020-02-20 NOTE — SOCIAL WORK
Pharmacy was called and they did not received the rx for the vanco, I was told they do not have in stock for today  but will have for tomorrow, I met with the pt and his SO and explained this to them I stated I would find pharmacy close to them that has  The medication in stock and I was told NO, she wants to [facility], rn is aware, I faxed the rx to the pharmacy now so it can be ordered to have for tomorrow, [facility] was made aware of the d/c and the d/c instructions were faxed, pt has his cath changed, pt denies any additional d/c needs, pt and So are in agreement with d/c an d/c plan, SO wants to make all the appointments herself, outpt cm referral was made for this pt, pt to be d/c home with [facility],

## 2020-02-20 NOTE — PHYSICAL THERAPY NOTE
02/20/20 0944   Pain Assessment   Pain Assessment No/denies pain   Restrictions/Precautions   Weight Bearing Precautions Per Order No   Other Precautions Contact/isolation; Fall Risk; Chair Alarm   General   Chart Reviewed Yes   Response to Previous Treatment Patient with no complaints from previous session  Family/Caregiver Present No   Cognition   Overall Cognitive Status WFL   Arousal/Participation Alert; Cooperative   Attention Attends with cues to redirect   Orientation Level Oriented X4   Memory Decreased recall of recent events   Following Commands Follows one step commands without difficulty   Subjective   Subjective "I feel dizzy "   Bed Mobility   Rolling R 4  Minimal assistance   Additional items Assist x 1;HOB elevated; Bedrails; Increased time required;LE management;Verbal cues   Rolling L 4  Minimal assistance   Additional items Assist x 1;HOB elevated; Increased time required;Verbal cues;LE management; Bedrails   Supine to Sit 4  Minimal assistance   Additional items Assist x 1; Increased time required; Bedrails;Verbal cues;LE management;HOB elevated   Transfers   Sit to Stand 4  Minimal assistance   Additional items Assist x 1; Increased time required;Verbal cues; Bedrails   Stand to Sit 4  Minimal assistance   Additional items Assist x 1; Increased time required;Verbal cues;Armrests   Stand pivot 4  Minimal assistance   Additional items Assist x 1; Increased time required;Verbal cues   Ambulation/Elevation   Gait pattern Decreased foot clearance; Improper Weight shift; Forward Flexion; Short stride; Excessively slow   Gait Assistance 4  Minimal assist   Additional items Assist x 1;Verbal cues; Tactile cues   Assistive Device Rolling walker   Distance 10 ft   Balance   Static Sitting Good   Dynamic Sitting Fair +   Static Standing Fair -   Dynamic Standing Poor +   Ambulatory Poor +   Endurance Deficit   Endurance Deficit Yes   Activity Tolerance   Activity Tolerance Patient limited by fatigue   Exercises Quad Sets Sitting;15 reps;AROM; Bilateral   Glute Sets Sitting;15 reps;AROM; Bilateral   Hip Flexion Sitting;15 reps;AROM; Bilateral   Hip Abduction Sitting;15 reps;AROM; Bilateral   Hip Adduction Sitting;15 reps;AROM; Bilateral   Knee AROM Long Arc Quad Sitting;15 reps;AROM; Bilateral   Ankle Pumps Sitting;15 reps;AROM; Bilateral   Assessment   Prognosis Fair   Problem List Decreased strength;Decreased endurance; Impaired balance;Decreased mobility; Decreased safety awareness   Assessment Pt  found resting in bed but agreeable to participate with PT treatment  Pt performed bed mobility including rolling from R side and then to L side with min Ax1  Pt  required assistance with BLE management with the use of BSR's  Pt  then transfered from supine to sit with min Ax1 with LE management  Pt  then required to sit at EOB for a long while due to reports of dizziness  Once dizziness cleared pt was able to transfer from sit to stand with min Ax1 and ambulate 10 ft with RW when reporting need to sit  Pt was assisted into stationary chair with minAx1 with RW   Pt  then performed ther ex as per treatment flow sheet x 15 reps  Pt  seemed to be self limiting during treatment  Pt  was left with all needs in reach and SCD's re-applied  Pt  would benefit from STR to improve functional mobility before returning home safely  Continue current POC and progress as tolerated  Goals   Patient Goals to get home   PT Treatment Day 2   Plan   Treatment/Interventions Functional transfer training;LE strengthening/ROM; Therapeutic exercise; Endurance training;Patient/family training;Bed mobility;Gait training   Progress Slow progress, decreased activity tolerance   PT Frequency Other (Comment)  (3-5x/wk)   Recommendation   Recommendation Short-term skilled PT   Equipment Recommended Walker   PT - OK to Discharge Yes   Additional Comments when medically stable   Burnice JAROCHO Orellana

## 2020-02-20 NOTE — CONSULTS
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  Plan:  Eighteen Kinyarwanda Haile catheter was changed  I will continue to follow him as an outpatient with Haile catheter change in 1 month  History of Present Illness   Attending: Hallie Samaniego MD  Reason for Consult / Principal Problem:  Urinary retention  HPI: Chayito Shirley is a [de-identified]y o  year old male who presents with chronic urinary retention with indwelling Haile catheter  He is now status post cholecystectomy  He is awaiting discharge home and is due for his catheter change today  He has been tolerating the Haile well  The catheter has been draining well with clear urine  Consults    Review of Systems    Historical Information   Past Medical History:   Diagnosis Date    Acute pancreatitis without infection or necrosis 9/26/2019    Anemia of chronic renal failure     BPH (benign prostatic hyperplasia)     Cancer (HCC)     liver cancer    Cardiac disease     Chronic kidney disease, stage 3 (HCC)     Coronary artery disease     Diabetes mellitus (HCC)     DJD (degenerative joint disease)     Essential hypertension     Gait disturbance     Generalized weakness     GERD (gastroesophageal reflux disease)     Gout     History of transfusion     Hydronephrosis     Hyperlipidemia     Hypertension     Liver cancer ([address] )     Pressure injury of skin     Proteinuria     Renal disorder     Retention of urine     Thrombophlebitis migrans     Type 2 diabetes mellitus ([address] )     Type 2 diabetes 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, with long-term current use of insulin ([address] ) 1/23/2019     Past Surgical History:   Procedure Laterality Date    CHOLECYSTECTOMY LAPAROSCOPIC N/A 2/7/2020    Procedure: CHOLECYSTECTOMY LAPAROSCOPIC;  Surgeon:  Hallie Samaniego MD;  Location: [address] [place] [address] OR; Service: General    CORONARY ANGIOPLASTY WITH STENT PLACEMENT      IR IMAGE GUIDED BIOPSY/ASPIRATION LIVER  1/24/2019    IR TUBE PLACEMENT ROQUE  9/6/2019     Social History   Social History     Substance and Sexual Activity   Alcohol Use Never    Frequency: Never     Social History     Substance and Sexual Activity   Drug Use Never     Social History     Tobacco Use   Smoking Status Never Smoker   Smokeless Tobacco Never Used     Family History: non-contributory    Meds/Allergies   current meds:   Current Facility-Administered Medications   Medication Dose Route Frequency    acetaminophen (TYLENOL) tablet 650 mg  650 mg Oral Q6H PRN    amLODIPine (NORVASC) tablet 10 mg  10 mg Oral Daily    b complex-vitamin C-folic acid (NEPHROCAPS) capsule 1 capsule  1 capsule Oral Daily With Dinner    cholecalciferol (VITAMIN D3) tablet 1,000 Units  1,000 Units Oral Daily    cholestyramine sugar free (QUESTRAN LIGHT) packet 4 g  4 g Oral BID    cloNIDine (CATAPRES) tablet 0 1 mg  0 1 mg Oral Q3H PRN    colchicine (COLCRYS) tablet 0 3 mg  0 3 mg Oral Daily    heparin (porcine) subcutaneous injection 5,000 Units  5,000 Units Subcutaneous Q8H [address]    hydrALAZINE (APRESOLINE) tablet 50 mg  50 mg Oral Q8H [address]    HYDROmorphone (DILAUDID) injection 0 5 mg  0 5 mg Intravenous Q4H PRN    insulin glargine (LANTUS) subcutaneous injection 15 Units 0 15 mL  15 Units Subcutaneous HS    insulin lispro (HumaLOG) 100 units/mL subcutaneous injection 1-5 Units  1-5 Units Subcutaneous TID AC    insulin lispro (HumaLOG) 100 units/mL subcutaneous injection 1-5 Units  1-5 Units Subcutaneous HS    ondansetron (ZOFRAN) injection 4 mg  4 mg Intravenous Q6H PRN    oxyCODONE-acetaminophen (PERCOCET) 5-325 mg per tablet 1 tablet  1 tablet Oral Q4H PRN    oxyCODONE-acetaminophen (PERCOCET) 5-325 mg per tablet 2 tablet  2 tablet Oral Q4H PRN    pantoprazole (PROTONIX) EC tablet 40 mg  40 mg Oral Early Morning    simethicone (MYLICON) chewable tablet 80 mg  80 mg Oral Q6H PRN    sodium chloride (PF) 0 9 % injection 10 mL  10 mL Intravenous Once per day on Mon Thu    tamsulosin (FLOMAX) capsule 0 4 mg  0 4 mg Oral Daily With Dinner    vancomycin (VANCOCIN) oral solution 125 mg  125 mg Oral Q6H [address]     No Known Allergies    Objective   Vitals: Blood pressure 142/69, pulse 69, temperature 97 6 °F (36 4 °C), temperature source Temporal, resp  rate 18, height 5' 8" (1 727 m), weight 71 6 kg (157 lb 13 6 oz), SpO2 97 %  I/O last 24 hours: In: 780 [P O :780]  Out: 2625 [Urine:2625]    Invasive Devices     Peripheral Intravenous Line            Peripheral IV 02/16/20 Left;Ventral (anterior) Wrist 4 days          Drain            Urethral Catheter 18 Fr  58 days                Physical Exam   Abdominal: Soft  He exhibits no distension  There is no tenderness  Penis with chronic hypospadias  Normal testicles  Haile catheter in good position draining clear urine  Lab Results: CBC: No results found for: WBC, HGB, HCT, MCV, PLT, ADJUSTEDWBC, MCH, MCHC, RDW, MPV, NRBC  CMP: No results found for: SODIUM, CL, CO2, ANIONGAP, BUN, CREATININE, GLUCOSE, CALCIUM, AST, ALT, ALKPHOS, PROT, BILITOT, EGFR  Urinalysis: No results found for: Dewaine Mailman, SPECGRAV, PHUR, LEUKOCYTESUR, NITRITE, PROTEINUA, GLUCOSEU, KETONESU, BILIRUBINUR, BLOODU  Urine Culture: No results found for: URINECX  Imaging Studies: I have personally reviewed pertinent reports  EKG, Pathology, and Other Studies: I have personally reviewed pertinent reports  VTE Prophylaxis: Sequential compression device (Venodyne)     Code Status: Level 1 - Full Code  Advance Directive and Living Will:      Power of :    POLST:      Counseling / Coordination of Care  Total floor / unit time spent today 30 minutes  Greater than 50% of total time was spent with the patient and / or family counseling and / or coordination of care

## 2020-02-20 NOTE — NURSING NOTE
Patient discharged from facility in stable condition  Discharge instructions, F/U appointments and medications reviewed with patient and wife  Questions addressed  My chart information provided

## 2020-02-21 ENCOUNTER — TELEPHONE (OUTPATIENT)
Dept: NEPHROLOGY | Facility: CLINIC | Age: 80
End: 2020-02-21

## 2020-02-21 DIAGNOSIS — Z71.89 COMPLEX CARE COORDINATION: Primary | ICD-10-CM

## 2020-02-21 DIAGNOSIS — N17.9 AKI (ACUTE KIDNEY INJURY) (HCC): Primary | ICD-10-CM

## 2020-02-21 NOTE — TELEPHONE ENCOUNTER
Evin Hires scheduled appointment for Delmar Gusman  They are aware of blood work prior to appointment

## 2020-02-21 NOTE — TELEPHONE ENCOUNTER
----- Message from Douglas Liu, [address] sent at 2/21/2020  7:11 AM EST -----  Hi! Please arrange hospital follow-up for Mr Maddox Angeles  I will place BMP order    Thanks  Anne Judd

## 2020-02-24 ENCOUNTER — TRANSCRIBE ORDERS (OUTPATIENT)
Dept: LAB | Facility: CLINIC | Age: 80
End: 2020-02-24

## 2020-02-24 ENCOUNTER — APPOINTMENT (OUTPATIENT)
Dept: LAB | Facility: CLINIC | Age: 80
End: 2020-02-24
Payer: COMMERCIAL

## 2020-02-24 DIAGNOSIS — N18.30 CKD (CHRONIC KIDNEY DISEASE) STAGE 3, GFR 30-59 ML/MIN (HCC): ICD-10-CM

## 2020-02-24 DIAGNOSIS — C7B.8 NEUROENDOCRINE CARCINOMA METASTATIC TO LIVER (HCC): ICD-10-CM

## 2020-02-24 DIAGNOSIS — C7A.8 NEUROENDOCRINE CARCINOMA METASTATIC TO LIVER (HCC): ICD-10-CM

## 2020-02-24 DIAGNOSIS — D3A.8 NEUROENDOCRINE TUMOR: Chronic | ICD-10-CM

## 2020-02-24 LAB
ALBUMIN SERPL BCP-MCNC: 2.9 G/DL (ref 3.5–5)
ALP SERPL-CCNC: 152 U/L (ref 46–116)
ALT SERPL W P-5'-P-CCNC: 15 U/L (ref 12–78)
ANION GAP SERPL CALCULATED.3IONS-SCNC: 8 MMOL/L (ref 4–13)
AST SERPL W P-5'-P-CCNC: 9 U/L (ref 5–45)
BASOPHILS # BLD AUTO: 0.09 THOUSANDS/ΜL (ref 0–0.1)
BASOPHILS NFR BLD AUTO: 1 % (ref 0–1)
BILIRUB SERPL-MCNC: 0.59 MG/DL (ref 0.2–1)
BUN SERPL-MCNC: 27 MG/DL (ref 5–25)
CALCIUM SERPL-MCNC: 9.4 MG/DL (ref 8.3–10.1)
CHLORIDE SERPL-SCNC: 108 MMOL/L (ref 100–108)
CO2 SERPL-SCNC: 24 MMOL/L (ref 21–32)
CREAT SERPL-MCNC: 2.38 MG/DL (ref 0.6–1.3)
EOSINOPHIL # BLD AUTO: 0.03 THOUSAND/ΜL (ref 0–0.61)
EOSINOPHIL NFR BLD AUTO: 0 % (ref 0–6)
ERYTHROCYTE [DISTWIDTH] IN BLOOD BY AUTOMATED COUNT: 15.6 % (ref 11.6–15.1)
GFR SERPL CREATININE-BSD FRML MDRD: 25 ML/MIN/1.73SQ M
GLUCOSE P FAST SERPL-MCNC: 116 MG/DL (ref 65–99)
HCT VFR BLD AUTO: 36.7 % (ref 36.5–49.3)
HGB BLD-MCNC: 11.2 G/DL (ref 12–17)
IMM GRANULOCYTES # BLD AUTO: 0.13 THOUSAND/UL (ref 0–0.2)
IMM GRANULOCYTES NFR BLD AUTO: 1 % (ref 0–2)
LYMPHOCYTES # BLD AUTO: 1.37 THOUSANDS/ΜL (ref 0.6–4.47)
LYMPHOCYTES NFR BLD AUTO: 9 % (ref 14–44)
MAGNESIUM SERPL-MCNC: 2.2 MG/DL (ref 1.6–2.6)
MCH RBC QN AUTO: 29.7 PG (ref 26.8–34.3)
MCHC RBC AUTO-ENTMCNC: 30.5 G/DL (ref 31.4–37.4)
MCV RBC AUTO: 97 FL (ref 82–98)
MONOCYTES # BLD AUTO: 1.18 THOUSAND/ΜL (ref 0.17–1.22)
MONOCYTES NFR BLD AUTO: 8 % (ref 4–12)
NEUTROPHILS # BLD AUTO: 12.85 THOUSANDS/ΜL (ref 1.85–7.62)
NEUTS SEG NFR BLD AUTO: 81 % (ref 43–75)
NRBC BLD AUTO-RTO: 0 /100 WBCS
PLATELET # BLD AUTO: 288 THOUSANDS/UL (ref 149–390)
PMV BLD AUTO: 11.1 FL (ref 8.9–12.7)
POTASSIUM SERPL-SCNC: 4.1 MMOL/L (ref 3.5–5.3)
PROT SERPL-MCNC: 7.8 G/DL (ref 6.4–8.2)
RBC # BLD AUTO: 3.77 MILLION/UL (ref 3.88–5.62)
SODIUM SERPL-SCNC: 140 MMOL/L (ref 136–145)
WBC # BLD AUTO: 15.65 THOUSAND/UL (ref 4.31–10.16)

## 2020-02-24 PROCEDURE — 83735 ASSAY OF MAGNESIUM: CPT

## 2020-02-24 PROCEDURE — 86316 IMMUNOASSAY TUMOR OTHER: CPT

## 2020-02-24 PROCEDURE — 36415 COLL VENOUS BLD VENIPUNCTURE: CPT

## 2020-02-24 PROCEDURE — 85025 COMPLETE CBC W/AUTO DIFF WBC: CPT

## 2020-02-24 PROCEDURE — 80053 COMPREHEN METABOLIC PANEL: CPT

## 2020-02-25 ENCOUNTER — OFFICE VISIT (OUTPATIENT)
Dept: NEPHROLOGY | Facility: CLINIC | Age: 80
End: 2020-02-25
Payer: COMMERCIAL

## 2020-02-25 VITALS
HEART RATE: 100 BPM | OXYGEN SATURATION: 97 % | WEIGHT: 144.4 LBS | HEIGHT: 68 IN | BODY MASS INDEX: 21.89 KG/M2 | DIASTOLIC BLOOD PRESSURE: 78 MMHG | SYSTOLIC BLOOD PRESSURE: 136 MMHG

## 2020-02-25 DIAGNOSIS — E44.0 MODERATE PROTEIN-CALORIE MALNUTRITION (HCC): Chronic | ICD-10-CM

## 2020-02-25 DIAGNOSIS — R06.02 SHORTNESS OF BREATH: ICD-10-CM

## 2020-02-25 DIAGNOSIS — Z97.8 CHRONIC INDWELLING FOLEY CATHETER: Chronic | ICD-10-CM

## 2020-02-25 DIAGNOSIS — N18.4 STAGE 4 CHRONIC KIDNEY DISEASE (HCC): ICD-10-CM

## 2020-02-25 DIAGNOSIS — E21.0 HYPERPARATHYROID BONE DISEASE (HCC): Primary | ICD-10-CM

## 2020-02-25 DIAGNOSIS — D50.8 IRON DEFICIENCY ANEMIA SECONDARY TO INADEQUATE DIETARY IRON INTAKE: ICD-10-CM

## 2020-02-25 DIAGNOSIS — N18.4 CKD (CHRONIC KIDNEY DISEASE) STAGE 4, GFR 15-29 ML/MIN (HCC): ICD-10-CM

## 2020-02-25 PROCEDURE — 99214 OFFICE O/P EST MOD 30 MIN: CPT | Performed by: NURSE PRACTITIONER

## 2020-02-25 NOTE — PROGRESS NOTES
Nephrology   Office Follow-Up  Mike Leon [de-identified] y o  male MRN: [id number]    Encounter: [id number]        Ralph Hirsch was seen in the [place] office  Diagnoses and all orders for this visit:    1  Recent Acute Kidney Injury   · Hospitalized 2/5/20-2/20/20 with acute cholecystitis and underwent open cholecystectomy, also was found to be C  Difficile positive  · Creatinine peaked at 3 07 mg/dL and he was dc'd with creatinine of 2 09 mg/dL  · His renal function fluctuated throughout hospitalization based on his volume status  Required small dose of lasix early in admission and actually required some IVF closer to discharge date due to frequent diarrhea which improved his renal function to below his baseline  · Most recent creatinine 2 38 mg/dL on 2/24/20, within his known baseline but on the higher side  2  Stage 4 Chronic Kidney Disease  · Upon review of the medical record, baseline creatinine appears to be around 2 1-2 4 mg/dL, frequent incidence of AKIs  · Will follow-up with updated blood an urine studies in about three months  · Does have appointment with PCP and primary Nephrologist, Dr Loree Sims, in March that patient wishes to keep  -     CBC and differential; Future  -     Comprehensive metabolic panel; Future  -     Phosphorus; Future  -     Protein / creatinine ratio, urine; Future  -     Urinalysis with microscopic; Future  -     Microalbumin / creatinine urine ratio; Future  3  Metabolic Acidosis  · Bicarb tabs were discontinued during recent hospitalization  · Acid-base balance remains stable at this time, continue to hold   4  Urinary Retnetion with Chronic Indwelling Urinary Catheter  · Recently changed by Dr Markus Ward  · Patent and draining clear yellow urine today  5  C  Diff Colitis  · Diagnosed during recent hospitalization  · Completed ten day course of oral vancomycin  · Had 1 loose bowel movement yesterday  6  Hypertension Associated with CKD 4  7   DM 2 Associated with CKD 4, diabetic nephropathy likely  · Blood sugars well controlled per significant other  8  Neuroendocrine Ca with liver Mets  ? Lancreotide injections 120 mg every 4 weeks, last dose 1/14/20  · Oncology visit scheduled for next week  9  Acute Cholecystitis  · Lap to open cholecystectomy performed 2/7/20  S/p BONILLA drain removal prior to dc from hospital  For f/u with surgery next week  10  Dyspnea on Exertion  · Patient complaining of dyspnea on exertion  Significant other inquiring about oxygen use  Per s/o, VNA checks pulsox at home at it is always around 97%  In office today, was also 97%  Patient is coughing/deep breathing at home and using flutter valve  No fever, chills, recent illnesses, sick contacts  No cough  No sputum production  HPI: Aldair Penny is a [de-identified] y o  male with an active problem list significant for CKD 4, hypertension, DM 2, neuroendocrine carcinoma, cholecystitis, who is here for hospital follow-up  Mr Ivania Griffith presented to [address] on 2/5/20 with acute cholecystitis and underwent an lap to open cholecystectomy on 2/7/20  A renal consultation was requested for acute kidney injury  His creatinine peaked at 3 07 mg/dL and he was discharged with a creatinine of 2 09 mg/dL  His most recent creatinine is 2 38 mg/dL  His hospitalization course was complicated with c difficile infection for which he was treated with 10 day course oral vancomycin  Mr Ivania Griffith is here today accompanied by his significant other  She states that Mr Ivania Griffith has been very tired since discharged from the hospital and has some dyspnea on exertion when trying to ambulate  VNA has been visiting  He had his BONILLA drain removed and she states his abdominal incisions are healing nicely  She is wondering if Quentcaity James should have oxygen at home because he is short of breath at times at home when he uses his roller walker  Bijancaity Jacob states he does not have trouble sleeping and does not notice it is more difficult to breathe at night  He does not have a cough, fever, chills  According to his significant other, his pulsox at home is consistently around 97%  Kathy Pantoja is without chest pain, dizziness  No pain at urinary catheter site  Kathy Pantoja states his catheter has been draining the usual amounts and usual color  Has had one loose bowel movement yesterday, none today  I am concerned that Mr Tan Reynoso is very weak after this hospital stay  I discussed this with Mr Tan Reynoso and his significant other  I want him to try to eat 3 meals per day and drink ensure with each meal  He should be coughing and deep breathing, also, he should try to increase his strength with walking and leg exercises  The medical record, including Care Everywhere and media tabs were reviewed  ROS:   All the systems were reviewed and were negative except as documented on the HPI  Allergies: Patient has no known allergies      Medications:   Current Outpatient Medications:     acetaminophen (TYLENOL) 325 mg tablet, Take by mouth as needed, Disp: , Rfl:     amLODIPine (NORVASC) 10 mg tablet, Take 10 mg by mouth daily, Disp: , Rfl:     aspirin 81 MG tablet, Take 81 mg by mouth daily, Disp: , Rfl:     b complex-vitamin C-folic acid (NEPHROCAPS) 1 mg capsule, Take 1 capsule by mouth daily with dinner, Disp: 60 capsule, Rfl: 0    cholecalciferol (VITAMIN D3) 1,000 units tablet, Take 1,000 Units by mouth daily, Disp: , Rfl:     cholestyramine sugar free (QUESTRAN LIGHT) 4 g packet, Take 1 packet (4 g total) by mouth 2 (two) times a day Hold if you are constipated, Disp: 60 packet, Rfl: 1    colchicine (COLCRYS) 0 6 mg tablet, Take 0 5 tablets (0 3 mg total) by mouth daily, Disp: 15 tablet, Rfl: 2    insulin detemir (LEVEMIR) 100 units/mL subcutaneous injection, Inject 10 Units under the skin daily at bedtime, Disp: , Rfl:     insulin lispro (HumaLOG) 100 units/mL injection, Inject under the skin 3 (three) times a day before meals, Disp: , Rfl:     Insulin Syringe-Needle U-100 30G X 1/2" 1 ML MISC, by Does not apply route 3 (three) times a day, Disp: 100 each, Rfl: 5    methenamine hippurate (HIPREX) 1 g tablet, Take 1 g by mouth 3 (three) times a day, Disp: , Rfl:     oxyCODONE-acetaminophen (PERCOCET) 5-325 mg per tablet, Take 1 tablet by mouth every 4 (four) hours as needed for moderate pain, Disp: , Rfl:     pantoprazole (PROTONIX) 40 mg tablet, Take 1 tablet (40 mg total) by mouth daily in the early morning, Disp: , Rfl: 0    simethicone (MYLICON) 80 mg chewable tablet, Chew 1 tablet (80 mg total) every 6 (six) hours as needed for flatulence, Disp: 90 tablet, Rfl: 5    tamsulosin (FLOMAX) 0 4 mg, Take 1 capsule (0 4 mg total) by mouth daily with dinner, Disp: , Rfl: 0    Past Medical History:   Diagnosis Date    Acute pancreatitis without infection or necrosis 9/26/2019    Anemia of chronic renal failure     BPH (benign prostatic hyperplasia)     Cancer (HCC)     liver cancer    Cardiac disease     Chronic kidney disease, stage 3 ([address] )     Coronary artery disease     Diabetes mellitus [facility]     DJD (degenerative joint disease)     Essential hypertension     Gait disturbance     Generalized weakness     GERD (gastroesophageal reflux disease)     Gout     History of transfusion     Hydronephrosis     Hyperlipidemia     Hypertension     Liver cancer ([address] )     Pressure injury of skin     Proteinuria     Renal disorder     Retention of urine     Thrombophlebitis migrans     Type 2 diabetes mellitus ([facility]     Type 2 diabetes 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, with long-term current use of insulin ([facility] [address] ) 1/23/2019     Past Surgical History:   Procedure Laterality Date    CHOLECYSTECTOMY LAPAROSCOPIC N/A 2/7/2020    Procedure: CHOLECYSTECTOMY LAPAROSCOPIC;  Surgeon:  Jonatan Doyle MD;  Location: [facility] MAIN OR;  Service: General    CORONARY ANGIOPLASTY WITH STENT PLACEMENT      IR IMAGE GUIDED [id number] [facility]  1/24/2019  IR TUBE PLACEMENT ROQUE  9/6/2019     Family History   Problem Relation Age of Onset    Cancer Mother     Hypertension Father     Cancer Brother     Cancer Maternal Grandmother     Cancer Paternal Aunt       reports that he has never smoked  He has never used smokeless tobacco  He reports that he does not drink alcohol or use drugs  Physical Exam:   Vitals:    02/25/20 1106   BP: 136/78   BP Location: Left arm   Patient Position: Sitting   Cuff Size: Standard   Pulse: 100   SpO2: 97%   Weight: 65 5 kg (144 lb 6 4 oz)   Height: 5' 8" (1 727 m)     Body mass index is 21 96 kg/m²  General: chronically ill appearing male conscious, cooperative, in no acute distress  Eyes: conjunctivae pink, anicteric sclerae  ENT: lips and mucous membranes moist  Neck: no masses, flat neck veins  Chest: clear breath sounds on left, slightly diminished on right, no crackles, ronchus or wheezings  CVS: S1 & S2, normal rate, regular rhythm  Abdomen: soft, non-tender, non-distended, normoactive bowel sounds  Extremities: no edema of both legs  Skin: no rash  Neuro: awake, alert, oriented      Lab Results:  Results from last 7 days   Lab Units 02/24/20  0943 02/19/20  0603   WBC Thousand/uL 15 65*  --    HEMOGLOBIN g/dL 11 2*  --    HEMATOCRIT % 36 7  --    PLATELETS Thousands/uL 288  --    POTASSIUM mmol/L 4 1 4 3   CHLORIDE mmol/L 108 108*   CO2 mmol/L 24 24   BUN mg/dL 27* 17   CREATININE mg/dL 2 38* 2 09*   CALCIUM mg/dL 9 4 8 1*   MAGNESIUM mg/dL 2 2 1 6*       Discussion:    Patient Instructions    Please call the office with any questions or concerns   Continue to cough, deep breathe   Continue to increase your movement, get up and walk around   Try to increase your food and drink, take ensure with meals   Avoid NSAIDs including: ibuprofen, Advil, aleve, motrin, naprosyn   Please obtain labs prior to your next appointment  Portions of the record may have been created with voice recognition software  Occasional wrong word or "sound a like" substitutions may have occurred due to the inherent limitations of voice recognition software  Read the chart carefully and recognize, using context, where substitutions have occurred  If you have any questions, please contact the dictating provider

## 2020-02-25 NOTE — PATIENT INSTRUCTIONS
 Please call the office with any questions or concerns   Continue to cough, deep breathe   Continue to increase your movement, get up and walk around   Try to increase your food and drink, take ensure with meals   Avoid NSAIDs including: ibuprofen, Advil, aleve, motrin, naprosyn   Please obtain labs prior to your next appointment

## 2020-02-26 LAB — CGA SERPL-MCNC: 2022 NG/ML (ref 0–101.8)

## 2020-02-27 NOTE — DISCHARGE SUMMARY
Discharge Summary - Obinna Jordan [de-identified] y o  male MRN: [id number]    Unit/Bed#: -01 Encounter: [id number]    Admission Date:   Admission Orders (From admission, onward)     Ordered        02/05/20 1206  Inpatient Admission  Once                     Admitting Diagnosis: Acute acalculous cholecystitis    HPI:  See prior discharge summary dictated on 02/19/2020    Procedures Performed: No orders of the defined types were placed in this encounter  Summary of Hospital Course:  See prior discharge summary    Significant Findings, Care, Treatment and Services Provided:  See prior discharge summary    Complications:  See prior discharge summary    Discharge Diagnosis:  See prior discharge summary    Resolved Problems  Date Reviewed: 2/25/2020    None          Condition at Discharge: See prior discharge summary         Discharge instructions/Information to patient and family:   See after visit summary for information provided to patient and family  Provisions for Follow-Up Care:  See after visit summary for information related to follow-up care and any pertinent home health orders  PCP: Jessica Swain DO    Disposition: Home    Planned Readmission: No      Discharge Statement   I spent 5 minutes fixing the date on this discharge summary to reflect the fact that the discharge summary was dictated on 02/19, and the patient was discharged on 02/20/2020  Discharge Medications:  See after visit summary for reconciled discharge medications provided to patient and family

## 2020-02-28 DIAGNOSIS — N18.9 ACUTE KIDNEY INJURY SUPERIMPOSED ON CHRONIC KIDNEY DISEASE (HCC): Chronic | ICD-10-CM

## 2020-02-28 DIAGNOSIS — N39.0 URINARY TRACT INFECTION WITHOUT HEMATURIA, SITE UNSPECIFIED: ICD-10-CM

## 2020-02-28 DIAGNOSIS — N39.0 URINARY TRACT INFECTION WITHOUT HEMATURIA, SITE UNSPECIFIED: Primary | ICD-10-CM

## 2020-02-28 DIAGNOSIS — N17.9 ACUTE KIDNEY INJURY SUPERIMPOSED ON CHRONIC KIDNEY DISEASE (HCC): Chronic | ICD-10-CM

## 2020-02-28 RX ORDER — METHENAMINE HIPPURATE 1000 MG/1
1 TABLET ORAL 3 TIMES DAILY
Qty: 90 TABLET | Refills: 5 | Status: SHIPPED | OUTPATIENT
Start: 2020-02-28 | End: 2020-02-28 | Stop reason: SDUPTHER

## 2020-02-28 RX ORDER — METHENAMINE HIPPURATE 1000 MG/1
1 TABLET ORAL 3 TIMES DAILY
Qty: 90 TABLET | Refills: 5 | Status: SHIPPED | OUTPATIENT
Start: 2020-02-28 | End: 2020-03-19 | Stop reason: HOSPADM

## 2020-02-28 RX ORDER — CHOLECALCIFEROL (VITAMIN D3) 10 MCG
1 TABLET ORAL
Qty: 60 CAPSULE | Refills: 0 | Status: SHIPPED | OUTPATIENT
Start: 2020-02-28 | End: 2020-05-18

## 2020-03-03 ENCOUNTER — OFFICE VISIT (OUTPATIENT)
Dept: HEMATOLOGY ONCOLOGY | Facility: CLINIC | Age: 80
End: 2020-03-03
Payer: COMMERCIAL

## 2020-03-03 ENCOUNTER — HOSPITAL ENCOUNTER (INPATIENT)
Facility: HOSPITAL | Age: 80
LOS: 5 days | Discharge: HOME HEALTH | DRG: 372 | End: 2020-03-08
Attending: EMERGENCY MEDICINE | Admitting: HOSPITALIST
Payer: COMMERCIAL

## 2020-03-03 ENCOUNTER — APPOINTMENT (INPATIENT)
Dept: RADIOLOGY | Facility: HOSPITAL | Age: 80
DRG: 372 | End: 2020-03-03
Payer: COMMERCIAL

## 2020-03-03 VITALS
DIASTOLIC BLOOD PRESSURE: 50 MMHG | OXYGEN SATURATION: 96 % | TEMPERATURE: 98.4 F | SYSTOLIC BLOOD PRESSURE: 100 MMHG | WEIGHT: 145.8 LBS | HEIGHT: 68 IN | RESPIRATION RATE: 18 BRPM | HEART RATE: 81 BPM | BODY MASS INDEX: 22.1 KG/M2

## 2020-03-03 DIAGNOSIS — C7A.8 NEUROENDOCRINE CARCINOMA METASTATIC TO LIVER (HCC): Primary | ICD-10-CM

## 2020-03-03 DIAGNOSIS — C7B.8 NEUROENDOCRINE CARCINOMA METASTATIC TO LIVER (HCC): Primary | ICD-10-CM

## 2020-03-03 DIAGNOSIS — R53.1 WEAKNESS GENERALIZED: Chronic | ICD-10-CM

## 2020-03-03 DIAGNOSIS — E87.6 ACUTE HYPOKALEMIA: Chronic | ICD-10-CM

## 2020-03-03 DIAGNOSIS — A04.72 CLOSTRIDIUM DIFFICILE DIARRHEA: Chronic | ICD-10-CM

## 2020-03-03 DIAGNOSIS — K81.0 ACUTE ACALCULOUS CHOLECYSTITIS: ICD-10-CM

## 2020-03-03 DIAGNOSIS — R53.1 WEAKNESS: ICD-10-CM

## 2020-03-03 DIAGNOSIS — N39.0 UTI (URINARY TRACT INFECTION): Primary | ICD-10-CM

## 2020-03-03 DIAGNOSIS — I95.89 OTHER SPECIFIED HYPOTENSION: ICD-10-CM

## 2020-03-03 DIAGNOSIS — E34.0 CARCINOID SYNDROME (HCC): ICD-10-CM

## 2020-03-03 PROBLEM — E87.2 METABOLIC ACIDOSIS: Status: RESOLVED | Noted: 2019-01-24 | Resolved: 2020-03-03

## 2020-03-03 PROBLEM — R19.7 DIARRHEA OF PRESUMED INFECTIOUS ORIGIN: Status: ACTIVE | Noted: 2020-03-03

## 2020-03-03 PROBLEM — I10 ACCELERATED HYPERTENSION: Status: RESOLVED | Noted: 2019-01-27 | Resolved: 2020-03-03

## 2020-03-03 PROBLEM — N18.4 STAGE 4 CHRONIC KIDNEY DISEASE (HCC): Chronic | Status: ACTIVE | Noted: 2020-02-25

## 2020-03-03 LAB
ALBUMIN SERPL BCP-MCNC: 3.6 G/DL (ref 3.5–5.7)
ALP SERPL-CCNC: 128 U/L (ref 55–165)
ALT SERPL W P-5'-P-CCNC: 17 U/L (ref 7–52)
ANION GAP SERPL CALCULATED.3IONS-SCNC: 12 MMOL/L (ref 4–13)
APTT PPP: 29 SECONDS (ref 23–37)
AST SERPL W P-5'-P-CCNC: 17 U/L (ref 13–39)
BACTERIA UR QL AUTO: ABNORMAL /HPF
BASOPHILS # BLD AUTO: 0.1 THOUSANDS/ΜL (ref 0–0.1)
BASOPHILS NFR BLD AUTO: 1 % (ref 0–2)
BILIRUB SERPL-MCNC: 0.4 MG/DL (ref 0.2–1)
BILIRUB UR QL STRIP: NEGATIVE
BUN SERPL-MCNC: 29 MG/DL (ref 7–25)
CALCIUM SERPL-MCNC: 8.8 MG/DL (ref 8.6–10.5)
CHLORIDE SERPL-SCNC: 105 MMOL/L (ref 98–107)
CLARITY UR: ABNORMAL
CO2 SERPL-SCNC: 20 MMOL/L (ref 21–31)
COLOR UR: YELLOW
CREAT SERPL-MCNC: 2.41 MG/DL (ref 0.7–1.3)
EOSINOPHIL # BLD AUTO: 0.1 THOUSAND/ΜL (ref 0–0.61)
EOSINOPHIL NFR BLD AUTO: 1 % (ref 0–5)
ERYTHROCYTE [DISTWIDTH] IN BLOOD BY AUTOMATED COUNT: 15.3 % (ref 11.5–14.5)
FLUAV RNA NPH QL NAA+PROBE: NORMAL
FLUBV RNA NPH QL NAA+PROBE: NORMAL
GFR SERPL CREATININE-BSD FRML MDRD: 24 ML/MIN/1.73SQ M
GLUCOSE SERPL-MCNC: 169 MG/DL (ref 65–140)
GLUCOSE SERPL-MCNC: 198 MG/DL (ref 65–99)
GLUCOSE UR STRIP-MCNC: NEGATIVE MG/DL
HCT VFR BLD AUTO: 33.4 % (ref 42–47)
HGB BLD-MCNC: 11.2 G/DL (ref 14–18)
HGB UR QL STRIP.AUTO: ABNORMAL
INR PPP: 1.03 (ref 0.9–1.5)
KETONES UR STRIP-MCNC: NEGATIVE MG/DL
LEUKOCYTE ESTERASE UR QL STRIP: ABNORMAL
LYMPHOCYTES # BLD AUTO: 1 THOUSANDS/ΜL (ref 0.6–4.47)
LYMPHOCYTES NFR BLD AUTO: 13 % (ref 21–51)
MCH RBC QN AUTO: 30.8 PG (ref 26–34)
MCHC RBC AUTO-ENTMCNC: 33.5 G/DL (ref 31–37)
MCV RBC AUTO: 92 FL (ref 81–99)
MONOCYTES # BLD AUTO: 0.4 THOUSAND/ΜL (ref 0.17–1.22)
MONOCYTES NFR BLD AUTO: 6 % (ref 2–12)
NEUTROPHILS # BLD AUTO: 6.4 THOUSANDS/ΜL (ref 1.4–6.5)
NEUTS SEG NFR BLD AUTO: 80 % (ref 42–75)
NITRITE UR QL STRIP: POSITIVE
NON-SQ EPI CELLS URNS QL MICRO: ABNORMAL /HPF
PH UR STRIP.AUTO: 6 [PH]
PLATELET # BLD AUTO: 244 THOUSANDS/UL (ref 149–390)
PMV BLD AUTO: 8.4 FL (ref 8.6–11.7)
POTASSIUM SERPL-SCNC: 3.8 MMOL/L (ref 3.5–5.5)
PROT SERPL-MCNC: 6.9 G/DL (ref 6.4–8.9)
PROT UR STRIP-MCNC: ABNORMAL MG/DL
PROTHROMBIN TIME: 12 SECONDS (ref 10.2–13)
RBC # BLD AUTO: 3.62 MILLION/UL (ref 4.3–5.9)
RBC #/AREA URNS AUTO: ABNORMAL /HPF
RSV RNA NPH QL NAA+PROBE: NORMAL
SODIUM SERPL-SCNC: 137 MMOL/L (ref 134–143)
SP GR UR STRIP.AUTO: 1.02 (ref 1–1.03)
TROPONIN I SERPL-MCNC: <0.03 NG/ML
UROBILINOGEN UR QL STRIP.AUTO: 0.2 E.U./DL
WBC # BLD AUTO: 8 THOUSAND/UL (ref 4.8–10.8)
WBC #/AREA URNS AUTO: ABNORMAL /HPF

## 2020-03-03 PROCEDURE — 87181 SC STD AGAR DILUTION PER AGT: CPT | Performed by: PHYSICIAN ASSISTANT

## 2020-03-03 PROCEDURE — 87186 SC STD MICRODIL/AGAR DIL: CPT | Performed by: PHYSICIAN ASSISTANT

## 2020-03-03 PROCEDURE — 80053 COMPREHEN METABOLIC PANEL: CPT | Performed by: EMERGENCY MEDICINE

## 2020-03-03 PROCEDURE — 84145 PROCALCITONIN (PCT): CPT | Performed by: HOSPITALIST

## 2020-03-03 PROCEDURE — 99223 1ST HOSP IP/OBS HIGH 75: CPT | Performed by: PHYSICIAN ASSISTANT

## 2020-03-03 PROCEDURE — 94760 N-INVAS EAR/PLS OXIMETRY 1: CPT

## 2020-03-03 PROCEDURE — 99215 OFFICE O/P EST HI 40 MIN: CPT | Performed by: NURSE PRACTITIONER

## 2020-03-03 PROCEDURE — 87631 RESP VIRUS 3-5 TARGETS: CPT | Performed by: EMERGENCY MEDICINE

## 2020-03-03 PROCEDURE — 96360 HYDRATION IV INFUSION INIT: CPT

## 2020-03-03 PROCEDURE — 94664 DEMO&/EVAL PT USE INHALER: CPT

## 2020-03-03 PROCEDURE — 87505 NFCT AGENT DETECTION GI: CPT | Performed by: HOSPITALIST

## 2020-03-03 PROCEDURE — 87493 C DIFF AMPLIFIED PROBE: CPT | Performed by: EMERGENCY MEDICINE

## 2020-03-03 PROCEDURE — 85025 COMPLETE CBC W/AUTO DIFF WBC: CPT | Performed by: EMERGENCY MEDICINE

## 2020-03-03 PROCEDURE — 84484 ASSAY OF TROPONIN QUANT: CPT | Performed by: EMERGENCY MEDICINE

## 2020-03-03 PROCEDURE — 85730 THROMBOPLASTIN TIME PARTIAL: CPT | Performed by: EMERGENCY MEDICINE

## 2020-03-03 PROCEDURE — 99285 EMERGENCY DEPT VISIT HI MDM: CPT | Performed by: EMERGENCY MEDICINE

## 2020-03-03 PROCEDURE — 81001 URINALYSIS AUTO W/SCOPE: CPT | Performed by: EMERGENCY MEDICINE

## 2020-03-03 PROCEDURE — 82948 REAGENT STRIP/BLOOD GLUCOSE: CPT

## 2020-03-03 PROCEDURE — 93005 ELECTROCARDIOGRAM TRACING: CPT

## 2020-03-03 PROCEDURE — 87077 CULTURE AEROBIC IDENTIFY: CPT | Performed by: PHYSICIAN ASSISTANT

## 2020-03-03 PROCEDURE — 71046 X-RAY EXAM CHEST 2 VIEWS: CPT

## 2020-03-03 PROCEDURE — 87086 URINE CULTURE/COLONY COUNT: CPT | Performed by: PHYSICIAN ASSISTANT

## 2020-03-03 PROCEDURE — 36415 COLL VENOUS BLD VENIPUNCTURE: CPT | Performed by: EMERGENCY MEDICINE

## 2020-03-03 PROCEDURE — 99285 EMERGENCY DEPT VISIT HI MDM: CPT

## 2020-03-03 PROCEDURE — 87040 BLOOD CULTURE FOR BACTERIA: CPT | Performed by: EMERGENCY MEDICINE

## 2020-03-03 PROCEDURE — 85610 PROTHROMBIN TIME: CPT | Performed by: EMERGENCY MEDICINE

## 2020-03-03 PROCEDURE — 89055 LEUKOCYTE ASSESSMENT FECAL: CPT | Performed by: HOSPITALIST

## 2020-03-03 RX ORDER — VANCOMYCIN HYDROCHLORIDE 125 MG/1
CAPSULE ORAL
COMMUNITY
Start: 2020-02-20 | End: 2020-03-08 | Stop reason: HOSPADM

## 2020-03-03 RX ORDER — PEN NEEDLE, DIABETIC 29 G X1/2"
NEEDLE, DISPOSABLE MISCELLANEOUS
COMMUNITY
Start: 2020-03-02 | End: 2021-01-01 | Stop reason: CLARIF

## 2020-03-03 RX ORDER — SODIUM CHLORIDE 9 MG/ML
75 INJECTION, SOLUTION INTRAVENOUS CONTINUOUS
Status: DISCONTINUED | OUTPATIENT
Start: 2020-03-03 | End: 2020-03-07

## 2020-03-03 RX ORDER — CHOLECALCIFEROL (VITAMIN D3) 10 MCG
1 TABLET ORAL
Status: DISCONTINUED | OUTPATIENT
Start: 2020-03-04 | End: 2020-03-08 | Stop reason: HOSPADM

## 2020-03-03 RX ORDER — ONDANSETRON 2 MG/ML
4 INJECTION INTRAMUSCULAR; INTRAVENOUS EVERY 6 HOURS PRN
Status: DISCONTINUED | OUTPATIENT
Start: 2020-03-03 | End: 2020-03-08 | Stop reason: HOSPADM

## 2020-03-03 RX ORDER — CEFTRIAXONE 1 G/50ML
1000 INJECTION, SOLUTION INTRAVENOUS ONCE
Status: COMPLETED | OUTPATIENT
Start: 2020-03-03 | End: 2020-03-03

## 2020-03-03 RX ORDER — PANTOPRAZOLE SODIUM 40 MG/1
40 TABLET, DELAYED RELEASE ORAL
Status: DISCONTINUED | OUTPATIENT
Start: 2020-03-04 | End: 2020-03-08 | Stop reason: HOSPADM

## 2020-03-03 RX ORDER — TAMSULOSIN HYDROCHLORIDE 0.4 MG/1
0.4 CAPSULE ORAL
Status: DISCONTINUED | OUTPATIENT
Start: 2020-03-04 | End: 2020-03-08 | Stop reason: HOSPADM

## 2020-03-03 RX ORDER — LANREOTIDE ACETATE 120 MG/.5ML
120 INJECTION SUBCUTANEOUS ONCE
Status: CANCELLED | OUTPATIENT
Start: 2020-03-11

## 2020-03-03 RX ORDER — MELATONIN
1000 DAILY
Status: DISCONTINUED | OUTPATIENT
Start: 2020-03-04 | End: 2020-03-08 | Stop reason: HOSPADM

## 2020-03-03 RX ORDER — HEPARIN SODIUM 5000 [USP'U]/ML
5000 INJECTION, SOLUTION INTRAVENOUS; SUBCUTANEOUS EVERY 8 HOURS SCHEDULED
Status: DISCONTINUED | OUTPATIENT
Start: 2020-03-03 | End: 2020-03-08 | Stop reason: HOSPADM

## 2020-03-03 RX ORDER — ASPIRIN 81 MG/1
81 TABLET, CHEWABLE ORAL DAILY
Status: DISCONTINUED | OUTPATIENT
Start: 2020-03-04 | End: 2020-03-08 | Stop reason: HOSPADM

## 2020-03-03 RX ORDER — ACETAMINOPHEN 325 MG/1
650 TABLET ORAL EVERY 6 HOURS PRN
Status: DISCONTINUED | OUTPATIENT
Start: 2020-03-03 | End: 2020-03-08 | Stop reason: HOSPADM

## 2020-03-03 RX ORDER — COLCHICINE 0.6 MG/1
0.3 TABLET ORAL DAILY
Status: DISCONTINUED | OUTPATIENT
Start: 2020-03-04 | End: 2020-03-08 | Stop reason: HOSPADM

## 2020-03-03 RX ORDER — AMLODIPINE BESYLATE 5 MG/1
10 TABLET ORAL DAILY
Status: DISCONTINUED | OUTPATIENT
Start: 2020-03-04 | End: 2020-03-08 | Stop reason: HOSPADM

## 2020-03-03 RX ADMIN — Medication 125 MG: at 21:43

## 2020-03-03 RX ADMIN — SODIUM CHLORIDE 75 ML/HR: 0.9 INJECTION, SOLUTION INTRAVENOUS at 19:58

## 2020-03-03 RX ADMIN — SODIUM CHLORIDE 500 ML: 0.9 INJECTION, SOLUTION INTRAVENOUS at 16:23

## 2020-03-03 RX ADMIN — INSULIN DETEMIR 10 UNITS: 100 INJECTION, SOLUTION SUBCUTANEOUS at 21:43

## 2020-03-03 RX ADMIN — HEPARIN SODIUM 5000 UNITS: 5000 INJECTION, SOLUTION INTRAVENOUS; SUBCUTANEOUS at 21:43

## 2020-03-03 RX ADMIN — CEFTRIAXONE 1000 MG: 1 INJECTION, SOLUTION INTRAVENOUS at 18:00

## 2020-03-03 NOTE — ED NOTES
Pt overheard by this RN stating "I'm going to go out and tell everyone how slow they are here " Charge nurse Jes Walker in to speak with patient regarding reason for wait and plan of care (despite being made aware multiple times during this ER visit)  Significant other at bedside and aware of same  Pt with CXR 2 view ordered and made aware he must have that done prior to admission  Awaiting admission bed at this time        Celina Ribeiro RN  03/03/20 1800

## 2020-03-03 NOTE — PROGRESS NOTES
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 [facility])  Patient is reporting ongoing diarrhea about 3 episodes per day which is worse than his usual baseline  The wife states that he does have some improvement with Imodium  He did miss his Lanreotide injection in February which may be contributory to the diarrhea  Review of chart did note that patient did have recent C diff infection while inpatient which was treated with oral vancomycin; recurrent C diff infection cannot be ruled out  Patient is scheduled for his next lanreotide injection on 03/11/2020  He will follow up again with us in the office in about a month from now with repeat labs prior     - CBC and differential; Future  - Comprehensive metabolic panel; Future  - C-reactive protein; Future  - Sedimentation rate, automated; Future  - Chromogranin A; Future    2  Carcinoid syndrome ([facility] [address] )    3  Acute acalculous cholecystitis  Status post open cholecystectomy 02/07/2020  Incision appears well-healed without any are erythema warmth or drainage  4  Other specified hypotension  Patient with symptomatic hypotension  Was 100/50 upon arrival had 1 episode of diarrhea while in the office repeat blood pressure 80/40  Continues to report lightheadedness and not feeling well  Wife suspicious of UTI with urine changes in his chronic Haile catheter  We will send patient to the emergency department for further evaluation of hypotension/evaluate for infectious etiology  EMS called and transported patient to the ER  ADT 21 transfer order placed  - Transfer to other facility    HPI:  Patient presents today for a follow-up visit accompanied by his wife  He was in the hospital recently 2/5/20 through 2/20/20    During the course of his hospital stay he had an open cholecystectomy done on 02/07/2020 and was found to be positive for C diff on 02/11/2020 which was treated with oral vancomycin  His wife states that he missed his February dose of Lanreotide due to hospitalization and is scheduled for his next 1 on 03/11/2020  His most recent laboratory studies from 02/24/2020 showed elevated WBC 15 65 is neutrophilia, H&H 11 2/36 7, MCV 97 platelet count 368  He has chronic renal dysfunction which is stable BUN 27, creatinine 2 38, GFR 25, albumin is low 2 9, alk-phos elevated 152, glucose 116  Chromogranin a slightly decreased from January 2022  Today the patient is reporting significant lightheadedness and not feeling well which he  just started to experience today  Blood pressure on arrival today is 100/50  His wife that that his blood sugar was 177 prior to leaving the house  He is drinking a lot of water according to his wife  He continues to have ongoing diarrhea up to 3 episodes per day which is improved with his p r n  Imodium according to the wife  The wife reports that his urine in his chronic Haile catheter seems to be more odorous than usual with cloudiness and sediment she is suspicious that he may have UTI  Patient did go to the bathroom during the encounter and had loose stool  When he returned back from the bathroom he was still experiencing significant lightheadedness  Recheck manual BP which was 80/40      Oncology History    Patient has multiple comorbid conditions including history of coronary artery disease, diabetes mellitus, hypertension, hyperlipidemia, and more recently metastatic neuroendocrine tumor       The patient was seen in consultation when he was in the hospital on the 5th February 2019 at that time he had significant weakness status post vasovagal syncope   He was evaluated with a CT scan of the abdomen and pelvis on the 23rd January 2019 which showed partially calcified mesenteric mass with multiple liver lesions compatible most likely with carcinoid malignancy   He also was found to have bilateral hydronephrosis and severe bladder wall thickening with prostatomegaly      The patient then had a core biopsy from 1 of the liver lesions on the 24th of January which showed well-differentiated neuroendocrine tumor grade 1-2 with Ki 67 of 3-5%      His chromogranin level  February 2019 was 273   His 24 hour urine for HIAA was 48 which is above normal   The patient was started on Lanreotide in March 2019            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 ([facility] [address] )    1/24/2019 Initial Diagnosis     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 ([facility] [address] )      1/24/2019 Biopsy     A  Liver mass, core biopsy:  - Well differentiated neuroendocrine tumor, G2         3/2019 -  Chemotherapy     Lanreotide 120 mg every 4 weeks injections          Interval history:    ROS: Review of Systems   Constitutional: Positive for fatigue  Negative for activity change, appetite change, chills, fever and unexpected weight change  HENT: Positive for trouble swallowing  Negative for congestion, mouth sores, nosebleeds and sore throat  Eyes: Negative  Respiratory: Positive for cough and shortness of breath  Negative for chest tightness  Cardiovascular: Negative for chest pain, palpitations and leg swelling  Gastrointestinal: Positive for diarrhea  Negative for abdominal distention, abdominal pain, blood in stool, constipation, nausea and vomiting  Genitourinary: Haile cath   Musculoskeletal: Negative for arthralgias, back pain, gait problem, joint swelling and myalgias  Skin: Positive for wound (RUQ well healed)  Negative for color change, pallor and rash  Flushing at times   Neurological: Positive for dizziness, weakness and light-headedness  Negative for numbness and headaches  Hematological: Negative for adenopathy  Does not bruise/bleed easily  Psychiatric/Behavioral: Negative for dysphoric mood and sleep disturbance  The patient is not nervous/anxious          Past Medical History: Diagnosis Date    Acute pancreatitis without infection or necrosis 9/26/2019    Anemia of chronic renal failure     BPH (benign prostatic hyperplasia)     Cancer (HCC)     liver cancer    Cardiac disease     Chronic kidney disease, stage 3 (HCC)     Coronary artery disease     [facility]     DJD (degenerative joint disease)     Essential hypertension     Gait disturbance     Generalized weakness     GERD (gastroesophageal reflux disease)     Gout     History of transfusion     Hydronephrosis     Hyperlipidemia     Hypertension     [facility]     Pressure injury of skin     Proteinuria     Renal disorder     Retention of urine     Thrombophlebitis migrans     Type 2 diabetes mellitus ([facility] )     Type 2 diabetes 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, with long-term current use of insulin ([facility] ) 1/23/2019       Past Surgical History:   Procedure Laterality Date    CHOLECYSTECTOMY LAPAROSCOPIC N/A 2/7/2020    Procedure: CHOLECYSTECTOMY LAPAROSCOPIC;  Surgeon: Yogi Thrasher MD;  Location: [facility] MAIN OR;  Service: General    CORONARY ANGIOPLASTY WITH STENT PLACEMENT      IR IMAGE GUIDED BIOPSY/ASPIRATION LIVER  1/24/2019    IR TUBE PLACEMENT ROQUE  9/6/2019       Social History     Socioeconomic History    Marital status:       Spouse name: None    Number of children: None    Years of education: None    Highest education level: None   Occupational History    None   Social Needs    Financial resource strain: None    Food insecurity:     Worry: None     Inability: None    Transportation needs:     Medical: None     Non-medical: None   Tobacco Use    Smoking status: Never Smoker    Smokeless tobacco: Never Used   Substance and Sexual Activity    Alcohol use: Never     Frequency: Never    Drug use: Never    Sexual activity: Never   Lifestyle    Physical activity:     Days per week: None     Minutes per session: None    Stress: None   Relationships  Social connections:     Talks on phone: None     Gets together: None     Attends Taoism service: None     Active member of club or organization: None     Attends meetings of clubs or organizations: None     Relationship status: None    Intimate partner violence:     Fear of current or ex partner: None     Emotionally abused: None     Physically abused: None     Forced sexual activity: None   Other Topics Concern    None   Social History Narrative    None       Family History   Problem Relation Age of Onset    Cancer Mother     Hypertension Father     Cancer Brother     Cancer Maternal Grandmother     Cancer Paternal Aunt        No Known Allergies      Current Outpatient Medications:     acetaminophen (TYLENOL) 325 mg tablet, Take by mouth as needed, Disp: , Rfl:     amLODIPine (NORVASC) 10 mg tablet, Take 10 mg by mouth daily, Disp: , Rfl:     aspirin 81 MG tablet, Take 81 mg by mouth daily, Disp: , Rfl:     b complex-vitamin C-folic acid (NEPHROCAPS) 1 mg capsule, Take 1 capsule by mouth daily with dinner, Disp: 60 capsule, Rfl: 0    cholecalciferol (VITAMIN D3) 1,000 units tablet, Take 1,000 Units by mouth daily, Disp: , Rfl:     cholestyramine sugar free (QUESTRAN LIGHT) 4 g packet, Take 1 packet (4 g total) by mouth 2 (two) times a day Hold if you are constipated, Disp: 60 packet, Rfl: 1    colchicine (COLCRYS) 0 6 mg tablet, Take 0 5 tablets (0 3 mg total) by mouth daily, Disp: 15 tablet, Rfl: 2    insulin detemir (LEVEMIR) 100 units/mL subcutaneous injection, Inject 10 Units under the skin daily at bedtime, Disp: , Rfl:     insulin lispro (HumaLOG) 100 units/mL injection, Inject under the skin 3 (three) times a day before meals, Disp: , Rfl:     Insulin Syringe-Needle U-100 30G X 1/2" 1 ML MISC, by Does not apply route 3 (three) times a day, Disp: 100 each, Rfl: 5    methenamine hippurate (HIPREX) 1 g tablet, Take 1 tablet (1 g total) by mouth 3 (three) times a day, Disp: 90 tablet, Rfl: 5    oxyCODONE-acetaminophen (PERCOCET) 5-325 mg per tablet, Take 1 tablet by mouth every 4 (four) hours as needed for moderate pain, Disp: , Rfl:     pantoprazole (PROTONIX) 40 mg tablet, Take 1 tablet (40 mg total) by mouth daily in the early morning, Disp: , Rfl: 0    simethicone (MYLICON) 80 mg chewable tablet, Chew 1 tablet (80 mg total) every 6 (six) hours as needed for flatulence, Disp: 90 tablet, Rfl: 5    tamsulosin (FLOMAX) 0 4 mg, Take 1 capsule (0 4 mg total) by mouth daily with dinner, Disp: , Rfl: 0    vancomycin (VANCOCIN) 125 MG capsule, , Disp: , Rfl:       Physical Exam:  /50 (BP Location: Right arm, Patient Position: Sitting, Cuff Size: Adult)   Pulse 81   Temp 98 4 °F (36 9 °C) (Tympanic)   Resp 18   Ht 5' 8" (1 727 m)   Wt 66 1 kg (145 lb 12 8 oz)   SpO2 96%   BMI 22 17 kg/m²     Physical Exam   Constitutional: He is oriented to person, place, and time  He appears well-developed and well-nourished  He appears distressed  HENT:   Head: Normocephalic and atraumatic  Mouth/Throat: Oropharynx is clear and moist  No oropharyngeal exudate  Eyes: Pupils are equal, round, and reactive to light  Conjunctivae are normal  No scleral icterus  Neck: Normal range of motion  Neck supple  No thyromegaly present  Cardiovascular: Regular rhythm, normal heart sounds and intact distal pulses  Tachycardia present  No murmur heard  Pulmonary/Chest: Effort normal and breath sounds normal  No respiratory distress  Abdominal: Soft  Bowel sounds are normal  He exhibits no distension  There is no hepatosplenomegaly  There is no tenderness  Genitourinary:   Genitourinary Comments: Haile catheter draining cloudy yellow urine   Musculoskeletal: Normal range of motion  He exhibits no edema  Lymphadenopathy:     He has no cervical adenopathy  He has no axillary adenopathy  Neurological: He is alert and oriented to person, place, and time  Skin: Skin is warm and dry   No rash noted  He is not diaphoretic  No erythema  No pallor  Right upper quadrant incision well healed-no erythema warmth or drainage noted   Psychiatric: He has a normal mood and affect  His behavior is normal  Judgment and thought content normal    Vitals reviewed  Labs:  Lab Results   Component Value Date    WBC 15 65 (H) 02/24/2020    HGB 11 2 (L) 02/24/2020    HCT 36 7 02/24/2020    MCV 97 02/24/2020     02/24/2020     Lab Results   Component Value Date    K 4 1 02/24/2020     02/24/2020    CO2 24 02/24/2020    BUN 27 (H) 02/24/2020    CREATININE 2 38 (H) 02/24/2020    GLUF 116 (H) 02/24/2020    CALCIUM 9 4 02/24/2020    AST 9 02/24/2020    ALT 15 02/24/2020    ALKPHOS 152 (H) 02/24/2020    EGFR 25 02/24/2020         Patient voiced understanding and agreement in the above discussion  Aware to contact our office with questions/symptoms in the interim  This note has been generated by voice recognition software system  Therefore, there may be spelling, grammar, and or syntax errors  Please contact if questions arise

## 2020-03-03 NOTE — ED PROVIDER NOTES
History  Chief Complaint   Patient presents with    Weakness - Generalized    Shortness of Breath    Diarrhea    Possible UTI     garcia cath present and noted to have cloudy urine     HPI     Patient is a pleasant 79year old who presents from his pcp with weakness  Vague, + body aches  Patient was weak and lightheaded at outpatient  No cough  No fever  + diarrhea  Garcia in place due to reported chronic urinary retention  + sob  No vomiting  No chest pain  Diarrhea for the past several days  Unable to quantify how much  Lives by himself with a caregiver/ SO  Patient with carcinoid syndrome  Missed injection last week  Recent Cdiff hospitalization/cholecysstectomy  BP 80/40 at outpatient, checked twice by the NP, both NP       MDM hypotension, possibly 2/2 diarrhea, currently with no hypotension, will admit  PER PRIOR NOTE FROM TODAY-----------  Assessment and Plan:  1  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)  Patient is reporting ongoing diarrhea about 3 episodes per day which is worse than his usual baseline  The wife states that he does have some improvement with Imodium  He did miss his Lanreotide injection in February which may be contributory to the diarrhea  Review of chart did note that patient did have recent C diff infection while inpatient which was treated with oral vancomycin; recurrent C diff infection cannot be ruled out  Patient is scheduled for his next lanreotide injection on 03/11/2020  He will follow up again with us in the office in about a month from now with repeat labs prior      - CBC and differential; Future  - Comprehensive metabolic panel; Future  - C-reactive protein; Future  - Sedimentation rate, automated; Future  - Chromogranin A; Future     2  Carcinoid syndrome ([facility] [address] )     3  Acute acalculous cholecystitis  Status post open cholecystectomy 02/07/2020    Incision appears well-healed without any are erythema warmth or drainage      4  Other specified hypotension  Patient with symptomatic hypotension  Was 100/50 upon arrival had 1 episode of diarrhea while in the office repeat blood pressure 80/40  Continues to report lightheadedness and not feeling well  Wife suspicious of UTI with urine changes in his chronic Haile catheter  We will send patient to the emergency department for further evaluation of hypotension/evaluate for infectious etiology  EMS called and transported patient to the ER  ADT 21 transfer order placed      - Transfer to other facility     HPI:  Patient presents today for a follow-up visit accompanied by his wife  He was in the hospital recently 2/5/20 through 2/20/20  During the course of his hospital stay he had an open cholecystectomy done on 02/07/2020 and was found to be positive for C diff on 02/11/2020 which was treated with oral vancomycin  His wife states that he missed his February dose of Lanreotide due to hospitalization and is scheduled for his next 1 on 03/11/2020  His most recent laboratory studies from 02/24/2020 showed elevated WBC 15 65 is neutrophilia, H&H 11 2/36 7, MCV 97 platelet count 227  He has chronic renal dysfunction which is stable BUN 27, creatinine 2 38, GFR 25, albumin is low 2 9, alk-phos elevated 152, glucose 116  Chromogranin a slightly decreased from January 2022      Today the patient is reporting significant lightheadedness and not feeling well which he  just started to experience today  Blood pressure on arrival today is 100/50  His wife that that his blood sugar was 177 prior to leaving the house  He is drinking a lot of water according to his wife  He continues to have ongoing diarrhea up to 3 episodes per day which is improved with his p r n  Imodium according to the wife  The wife reports that his urine in his chronic Haile catheter seems to be more odorous than usual with cloudiness and sediment she is suspicious that he may have UTI    Patient did go to the bathroom during the encounter and had loose stool  When he returned back from the bathroom he was still experiencing significant lightheadedness  Recheck manual BP which was 80/40           Oncology History     Patient has multiple comorbid conditions including history of coronary artery disease, diabetes mellitus, hypertension, hyperlipidemia, and more recently metastatic neuroendocrine tumor       The patient was seen in consultation when he was in the hospital on the 5th February 2019 at that time he had significant weakness status post vasovagal syncope   He was evaluated with a CT scan of the abdomen and pelvis on the 23rd January 2019 which showed partially calcified mesenteric mass with multiple liver lesions compatible most likely with carcinoid malignancy   He also was found to have bilateral hydronephrosis and severe bladder wall thickening with prostatomegaly      The patient then had a core biopsy from 1 of the liver lesions on the 24th of January which showed well-differentiated neuroendocrine tumor grade 1-2 with Ki 67 of 3-5%      His chromogranin level  February 2019 was 273   His 24 hour urine for HIAA was 48 which is above normal   The patient was started on Lanreotide in March 2019              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 [facility])     1/24/2019 Initial Diagnosis       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 [facility])        1/24/2019 Biopsy       A  Liver mass, core biopsy:  - Well differentiated neuroendocrine tumor, G2           3/2019 -  Chemotherapy       Lanreotide 120 mg every 4 weeks injections             Interval history:     ROS: Review of Systems   Constitutional: Positive for fatigue  Negative for activity change, appetite change, chills, fever and unexpected weight change  HENT: Positive for trouble swallowing  Negative for congestion, mouth sores, nosebleeds and sore throat  Eyes: Negative      Respiratory: Positive for cough and shortness of breath  Negative for chest tightness  Cardiovascular: Negative for chest pain, palpitations and leg swelling  Gastrointestinal: Positive for diarrhea  Negative for abdominal distention, abdominal pain, blood in stool, constipation, nausea and vomiting  Genitourinary: Haile cath   Musculoskeletal: Negative for arthralgias, back pain, gait problem, joint swelling and myalgias  Skin: Positive for wound (RUQ well healed)  Negative for color change, pallor and rash  Flushing at times   Neurological: Positive for dizziness, weakness and light-headedness  Negative for numbness and headaches  Hematological: Negative for adenopathy  Does not bruise/bleed easily  Psychiatric/Behavioral: Negative for dysphoric mood and sleep disturbance  The patient is not nervous/anxious          --------------------------------------        Prior to Admission Medications   Prescriptions Last Dose Informant Patient Reported? Taking?    BD INSULIN SYRINGE U/F 30G X 1/2" 0 3 ML MISC 3/3/2020 at Unknown time  Yes Yes   Insulin Syringe-Needle U-100 30G X 1/2" 1 ML MISC 3/2/2020 at Unknown time Spouse/Significant Other No Yes   Sig: by Does not apply route 3 (three) times a day   acetaminophen (TYLENOL) 325 mg tablet Past Week at Unknown time Spouse/Significant Other Yes Yes   Sig: Take by mouth as needed   amLODIPine (NORVASC) 10 mg tablet 3/3/2020 at Unknown time Spouse/Significant Other Yes Yes   Sig: Take 10 mg by mouth daily   aspirin 81 MG tablet 3/3/2020 at Unknown time Spouse/Significant Other Yes Yes   Sig: Take 81 mg by mouth daily   b complex-vitamin C-folic acid (NEPHROCAPS) 1 mg capsule 3/2/2020 at Unknown time Spouse/Significant Other No Yes   Sig: Take 1 capsule by mouth daily with dinner   cholecalciferol (VITAMIN D3) 1,000 units tablet 3/3/2020 at Unknown time Spouse/Significant Other Yes Yes   Sig: Take 1,000 Units by mouth daily   cholestyramine sugar free (QUESTRAN LIGHT) 4 g packet Not Taking at Unknown time Spouse/Significant Other No No   Sig: Take 1 packet (4 g total) by mouth 2 (two) times a day Hold if you are constipated   Patient not taking: Reported on 3/3/2020   colchicine (COLCRYS) 0 6 mg tablet 3/3/2020 at Unknown time Spouse/Significant Other No Yes   Sig: Take 0 5 tablets (0 3 mg total) by mouth daily   insulin detemir (LEVEMIR) 100 units/mL subcutaneous injection 3/2/2020 at Unknown time Spouse/Significant Other Yes Yes   Sig: Inject 10 Units under the skin daily at bedtime   insulin lispro (HumaLOG) 100 units/mL injection 3/2/2020 at Unknown time Spouse/Significant Other Yes Yes   Sig: Inject under the skin 3 (three) times a day before meals   methenamine hippurate (HIPREX) 1 g tablet 3/3/2020 at Unknown time Spouse/Significant Other No Yes   Sig: Take 1 tablet (1 g total) by mouth 3 (three) times a day   oxyCODONE-acetaminophen (PERCOCET) 5-325 mg per tablet Not Taking at Unknown time Spouse/Significant Other Yes No   Sig: Take 1 tablet by mouth every 4 (four) hours as needed for moderate pain   pantoprazole (PROTONIX) 40 mg tablet  Spouse/Significant Other No No   Sig: Take 1 tablet (40 mg total) by mouth daily in the early morning   simethicone (MYLICON) 80 mg chewable tablet 3/3/2020 at Unknown time Spouse/Significant Other No Yes   Sig: Chew 1 tablet (80 mg total) every 6 (six) hours as needed for flatulence   tamsulosin (FLOMAX) 0 4 mg 3/2/2020 at Unknown time Spouse/Significant Other No Yes   Sig: Take 1 capsule (0 4 mg total) by mouth daily with dinner   vancomycin (VANCOCIN) 125 MG capsule Not Taking at Unknown time  Yes No      Facility-Administered Medications: None       Past Medical History:   Diagnosis Date    Acute pancreatitis without infection or necrosis 9/26/2019    Anemia of chronic renal failure     BPH (benign prostatic hyperplasia)     Cancer (HCC)     liver cancer    Cardiac disease     Chronic kidney disease, stage 3 (HCC)     Coronary artery disease     Diabetes mellitus ([facility] )     DJD (degenerative joint disease)     Essential hypertension     Gait disturbance     Generalized weakness     GERD (gastroesophageal reflux disease)     Gout     History of transfusion     Hydronephrosis     Hyperlipidemia     Hypertension     [facility]     Pressure injury of skin     Proteinuria     Renal disorder     Retention of urine     Thrombophlebitis migrans     Type 2 diabetes mellitus (HCC)     Type 2 diabetes 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, with long-term current use of insulin ([facility] ) 1/23/2019       Past Surgical History:   Procedure Laterality Date    CHOLECYSTECTOMY LAPAROSCOPIC N/A 2/7/2020    Procedure: CHOLECYSTECTOMY LAPAROSCOPIC;  Surgeon: Willy Bose MD;  Location: [facility] MAIN OR;  Service: General    CORONARY ANGIOPLASTY WITH STENT PLACEMENT      IR IMAGE GUIDED BIOPSY/ASPIRATION LIVER  1/24/2019    IR TUBE PLACEMENT ROQUE  9/6/2019       Family History   Problem Relation Age of Onset    Cancer Mother     Hypertension Father     Cancer Brother     Cancer Maternal Grandmother     Cancer Paternal Aunt      I have reviewed and agree with the history as documented  E-Cigarette/Vaping    E-Cigarette Use Never User      E-Cigarette/Vaping Substances     Social History     Tobacco Use    Smoking status: Never Smoker    Smokeless tobacco: Never Used   Substance Use Topics    Alcohol use: Never     Frequency: Never    Drug use: Never       Review of Systems   Constitutional: Positive for fatigue  All other systems reviewed and are negative  Physical Exam  Physical Exam   Constitutional: He is oriented to person, place, and time  He appears well-developed and well-nourished  HENT:   Head: Normocephalic and atraumatic  Eyes: Pupils are equal, round, and reactive to light  EOM are normal    Neck: Normal range of motion  Neck supple  Cardiovascular: Normal rate, regular rhythm and normal heart sounds     No murmur heard  Pulmonary/Chest: Effort normal and breath sounds normal  No respiratory distress  He has no wheezes  Abdominal: Soft  Bowel sounds are normal  He exhibits no distension  There is no tenderness  Musculoskeletal: Normal range of motion  He exhibits no edema or tenderness  Neurological: He is alert and oriented to person, place, and time  No cranial nerve deficit  Coordination normal    Skin: Skin is warm and dry  He is not diaphoretic  No erythema  Psychiatric: He has a normal mood and affect  His behavior is normal    Nursing note and vitals reviewed        Vital Signs  ED Triage Vitals [03/03/20 1443]   Temperature Pulse Respirations Blood Pressure SpO2   97 6 °F (36 4 °C) 92 20 150/78 100 %      Temp Source Heart Rate Source Patient Position - Orthostatic VS BP Location FiO2 (%)   Temporal Monitor Sitting Left arm --      Pain Score       No Pain           Vitals:    03/03/20 2343 03/04/20 0740 03/04/20 1558 03/04/20 2244   BP: 168/83 168/70 (!) 172/82 169/77   Pulse: 77 71 69 74   Patient Position - Orthostatic VS: Lying Lying Lying Lying         Visual Acuity      ED Medications  Medications   amLODIPine (NORVASC) tablet 10 mg (10 mg Oral Given 3/4/20 0906)   aspirin chewable tablet 81 mg (81 mg Oral Given 3/4/20 0906)   b complex-vitamin C-folic acid (NEPHROCAPS) capsule 1 capsule (1 capsule Oral Given 3/4/20 1531)   cholecalciferol (VITAMIN D3) tablet 1,000 Units (1,000 Units Oral Given 3/4/20 0906)   colchicine (COLCRYS) tablet 0 3 mg (0 3 mg Oral Given 3/4/20 0907)   insulin detemir (LEVEMIR) subcutaneous injection 10 Units (10 Units Subcutaneous Not Given 3/4/20 2217)   pantoprazole (PROTONIX) EC tablet 40 mg (40 mg Oral Given 3/5/20 0510)   tamsulosin (FLOMAX) capsule 0 4 mg (0 4 mg Oral Given 3/4/20 1531)   sodium chloride 0 9 % infusion (75 mL/hr Intravenous Not Given 3/5/20 0337)   ondansetron (ZOFRAN) injection 4 mg (has no administration in time range)   heparin (porcine) subcutaneous injection 5,000 Units (5,000 Units Subcutaneous Given 3/5/20 0510)   acetaminophen (TYLENOL) tablet 650 mg (has no administration in time range)   insulin lispro (HumaLOG) 100 units/mL subcutaneous injection 1-5 Units (1 Units Subcutaneous Refused 3/4/20 1819)   vancomycin (VANCOCIN) oral solution 125 mg (125 mg Oral Given 3/5/20 0337)   potassium chloride (K-DUR,KLOR-CON) CR tablet 40 mEq (40 mEq Oral Given 3/4/20 1821)   sodium chloride 0 9 % bolus 500 mL (0 mL Intravenous Stopped 3/3/20 1916)   cefTRIAXone (ROCEPHIN) IVPB (premix) 1,000 mg (0 mg Intravenous Stopped 3/3/20 1916)   magnesium sulfate 2 g/50 mL IVPB (premix) 2 g (2 g Intravenous New Bag 3/4/20 1036)       Diagnostic Studies  Results Reviewed     Procedure Component Value Units Date/Time    Blood culture #1 [[id number]] Collected:  03/03/20 1608    Lab Status:  Preliminary result Specimen:  Blood from Arm, Left Updated:  03/04/20 2301     Blood Culture No Growth at 24 hrs  Blood culture #2 [[id number]] Collected:  03/03/20 1610    Lab Status:  Preliminary result Specimen:  Blood from Arm, Left Updated:  03/04/20 2301     Blood Culture No Growth at 24 hrs  Clostridium difficile toxin by PCR with EIA [[id number]]  (Abnormal) Collected:  03/03/20 2057    Lab Status:  Final result Specimen:  Stool from Per Rectum Updated:  03/04/20 1429     C difficile toxin by PCR Positive     C difficile Toxins A+B, EIA Negative    Procalcitonin [[id number]]  (Normal) Collected:  03/03/20 1608    Lab Status:  Final result Specimen:  Blood Updated:  03/04/20 1046     Procalcitonin 0 07 ng/ml     Urine culture [[id number]] Collected:  03/03/20 1632    Lab Status:   In process Specimen:  Urine, Indwelling Haile Catheter Updated:  03/03/20 2110    Urine Microscopic [[id number]]  (Abnormal) Collected:  03/03/20 1632    Lab Status:  Final result Specimen:  Urine, Indwelling Haile Catheter Updated:  03/03/20 1705     RBC, UA 1-2 /hpf      WBC, UA Innumerable /hpf Epithelial Cells Occasional /hpf      Bacteria, UA Occasional /hpf     Influenza A/B and RSV PCR [[id number]]  (Normal) Collected:  03/03/20 1610    Lab Status:  Final result Specimen:  Nasopharyngeal Swab Updated:  03/03/20 1652     INFLUENZA A PCR None Detected     INFLUENZA B PCR None Detected     RSV PCR None Detected    Comprehensive metabolic panel [[id number]]  (Abnormal) Collected:  03/03/20 1608    Lab Status:  Final result Specimen:  Blood from Arm, Left Updated:  03/03/20 1640     Sodium 137 mmol/L      Potassium 3 8 mmol/L      Chloride 105 mmol/L      CO2 20 mmol/L      ANION GAP 12 mmol/L      BUN 29 mg/dL      Creatinine 2 41 mg/dL      Glucose 198 mg/dL      Calcium 8 8 mg/dL      AST 17 U/L      ALT 17 U/L      Alkaline Phosphatase 128 U/L      Total Protein 6 9 g/dL      Albumin 3 6 g/dL      Total Bilirubin 0 40 mg/dL      eGFR 24 ml/min/1 73sq m     Narrative:       National Kidney Disease Foundation guidelines for Chronic Kidney Disease (CKD):     Stage 1 with normal or high GFR (GFR > 90 mL/min/1 73 square meters)    Stage 2 Mild CKD (GFR = 60-89 mL/min/1 73 square meters)    Stage 3A Moderate CKD (GFR = 45-59 mL/min/1 73 square meters)    Stage 3B Moderate CKD (GFR = 30-44 mL/min/1 73 square meters)    Stage 4 Severe CKD (GFR = 15-29 mL/min/1 73 square meters)    Stage 5 End Stage CKD (GFR <15 mL/min/1 73 square meters)  Note: GFR calculation is accurate only with a steady state creatinine    UA (URINE) with reflex to Scope [[id number]]  (Abnormal) Collected:  03/03/20 1632    Lab Status:  Final result Specimen:  Urine, Indwelling Haile Catheter Updated:  03/03/20 1639     Color, UA Yellow     Clarity, UA Cloudy     Specific [place], UA 1 020     pH, UA 6 0     Leukocytes, UA 3+     Nitrite, UA Positive     Protein, UA 1+ mg/dl      Glucose, UA Negative mg/dl      Ketones, UA Negative mg/dl      Urobilinogen, UA 0 2 E U /dl      Bilirubin, UA Negative     Blood, UA 1+    Troponin I [[id number]]  (Normal) Collected:  03/03/20 1608    Lab Status:  Final result Specimen:  Blood from Arm, Left Updated:  03/03/20 1637     Troponin I <0 03 ng/mL     Protime-INR [[id number]]  (Normal) Collected:  03/03/20 1608    Lab Status:  Final result Specimen:  Blood from Arm, Left Updated:  03/03/20 1633     Protime 12 0 seconds      INR 1 03    APTT [[id number]]  (Normal) Collected:  03/03/20 1608    Lab Status:  Final result Specimen:  Blood from Arm, Left Updated:  03/03/20 1633     PTT 29 seconds     CBC and differential [[id number]]  (Abnormal) Collected:  03/03/20 1608    Lab Status:  Final result Specimen:  Blood from Arm, Left Updated:  03/03/20 1620     WBC 8 00 Thousand/uL      RBC 3 62 Million/uL      Hemoglobin 11 2 g/dL      Hematocrit 33 4 %      MCV 92 fL      MCH 30 8 pg      MCHC 33 5 g/dL      RDW 15 3 %      MPV 8 4 fL      Platelets 278 Thousands/uL      Neutrophils Relative 80 %      Lymphocytes Relative 13 %      Monocytes Relative 6 %      Eosinophils Relative 1 %      Basophils Relative 1 %      Neutrophils Absolute 6 40 Thousands/µL      Lymphocytes Absolute 1 00 Thousands/µL      Monocytes Absolute 0 40 Thousand/µL      Eosinophils Absolute 0 10 Thousand/µL      Basophils Absolute 0 10 Thousands/µL                  XR chest 2 views   Final Result by Felicity Reed MD (03/04 6610)      No acute cardiopulmonary disease              Workstation performed: [id number]                    Procedures  Procedures         ED Course                               MDM      Disposition  Final diagnoses:   UTI (urinary tract infection)   Weakness     Time reflects when diagnosis was documented in both MDM as applicable and the Disposition within this note     Time User Action Codes Description Comment    3/3/2020  5:50 PM Mars Diego, [address] [N39 0] UTI (urinary tract infection)     3/3/2020  5:50 PM Mars Diego, [address] [R53 1] Weakness       ED Disposition     ED Disposition Condition Date/Time Comment Discharge Stable Tu Mar 3, 2020  5:50 PM Anton Vera discharge to home/self care              Follow-up Information    None         Current Discharge Medication List      CONTINUE these medications which have NOT CHANGED    Details   acetaminophen (TYLENOL) 325 mg tablet Take by mouth as needed      amLODIPine (NORVASC) 10 mg tablet Take 10 mg by mouth daily      aspirin 81 MG tablet Take 81 mg by mouth daily      b complex-vitamin C-folic acid (NEPHROCAPS) 1 mg capsule Take 1 capsule by mouth daily with dinner  Qty: 60 capsule, Refills: 0    Associated Diagnoses: Acute kidney injury superimposed on chronic kidney disease (HCC)      BD INSULIN SYRINGE U/F 30G X 1/2" 0 3 ML MISC       cholecalciferol (VITAMIN D3) 1,000 units tablet Take 1,000 Units by mouth daily      colchicine (COLCRYS) 0 6 mg tablet Take 0 5 tablets (0 3 mg total) by mouth daily  Qty: 15 tablet, Refills: 2    Associated Diagnoses: Gout, unspecified cause, unspecified chronicity, unspecified site      insulin detemir (LEVEMIR) 100 units/mL subcutaneous injection Inject 10 Units under the skin daily at bedtime      insulin lispro (HumaLOG) 100 units/mL injection Inject under the skin 3 (three) times a day before meals      Insulin Syringe-Needle U-100 30G X 1/2" 1 ML MISC by Does not apply route 3 (three) times a day  Qty: 100 each, Refills: 5    Associated Diagnoses: Type 2 diabetes mellitus with hyperglycemia, with long-term current use of insulin ([facility])      methenamine hippurate (HIPREX) 1 g tablet Take 1 tablet (1 g total) by mouth 3 (three) times a day  Qty: 90 tablet, Refills: 5    Associated Diagnoses: Urinary tract infection without hematuria, site unspecified      simethicone (MYLICON) 80 mg chewable tablet Chew 1 tablet (80 mg total) every 6 (six) hours as needed for flatulence  Qty: 90 tablet, Refills: 5    Associated Diagnoses: Gas pain      tamsulosin (FLOMAX) 0 4 mg Take 1 capsule (0 4 mg total) by mouth daily with dinner  Refills: 0    Associated Diagnoses: Acute renal failure with other specified pathological kidney lesion superimposed on stage 3 chronic kidney disease (HCC)      cholestyramine sugar free (QUESTRAN LIGHT) 4 g packet Take 1 packet (4 g total) by mouth 2 (two) times a day Hold if you are constipated  Qty: 60 packet, Refills: 1    Associated Diagnoses: C  difficile colitis      oxyCODONE-acetaminophen (PERCOCET) 5-325 mg per tablet Take 1 tablet by mouth every 4 (four) hours as needed for moderate pain      pantoprazole (PROTONIX) 40 mg tablet Take 1 tablet (40 mg total) by mouth daily in the early morning  Refills: 0    Associated Diagnoses: Nausea and vomiting      vancomycin (VANCOCIN) 125 MG capsule            No discharge procedures on file      PDMP Review     None          ED Provider  Electronically Signed by           Triny Mirza MD  03/05/20 9500

## 2020-03-04 PROBLEM — C7A.8 NEUROENDOCRINE CARCINOMA METASTATIC TO LIVER (HCC): Chronic | Status: ACTIVE | Noted: 2019-02-26

## 2020-03-04 PROBLEM — C7B.8 NEUROENDOCRINE CARCINOMA METASTATIC TO LIVER (HCC): Chronic | Status: ACTIVE | Noted: 2019-02-26

## 2020-03-04 PROBLEM — E87.6 ACUTE HYPOKALEMIA: Status: ACTIVE | Noted: 2020-03-04

## 2020-03-04 PROBLEM — R19.7 DIARRHEA OF PRESUMED INFECTIOUS ORIGIN: Chronic | Status: ACTIVE | Noted: 2020-03-03

## 2020-03-04 LAB
ALBUMIN SERPL BCP-MCNC: 3.2 G/DL (ref 3.5–5.7)
ALP SERPL-CCNC: 112 U/L (ref 55–165)
ALT SERPL W P-5'-P-CCNC: 16 U/L (ref 7–52)
ANION GAP SERPL CALCULATED.3IONS-SCNC: 10 MMOL/L (ref 4–13)
AST SERPL W P-5'-P-CCNC: 22 U/L (ref 13–39)
ATRIAL RATE: 90 BPM
BASOPHILS # BLD AUTO: 0.1 THOUSANDS/ΜL (ref 0–0.1)
BASOPHILS NFR BLD AUTO: 1 % (ref 0–2)
BILIRUB SERPL-MCNC: 0.3 MG/DL (ref 0.2–1)
BUN SERPL-MCNC: 27 MG/DL (ref 7–25)
C DIFF TOX A+B STL QL IA: NEGATIVE
C DIFF TOX GENS STL QL NAA+PROBE: POSITIVE
CALCIUM SERPL-MCNC: 8.4 MG/DL (ref 8.6–10.5)
CAMPYLOBACTER DNA SPEC NAA+PROBE: NORMAL
CHLORIDE SERPL-SCNC: 111 MMOL/L (ref 98–107)
CO2 SERPL-SCNC: 21 MMOL/L (ref 21–31)
CREAT SERPL-MCNC: 2.19 MG/DL (ref 0.7–1.3)
EOSINOPHIL # BLD AUTO: 0.2 THOUSAND/ΜL (ref 0–0.61)
EOSINOPHIL NFR BLD AUTO: 2 % (ref 0–5)
ERYTHROCYTE [DISTWIDTH] IN BLOOD BY AUTOMATED COUNT: 15.3 % (ref 11.5–14.5)
GFR SERPL CREATININE-BSD FRML MDRD: 27 ML/MIN/1.73SQ M
GLUCOSE SERPL-MCNC: 102 MG/DL (ref 65–140)
GLUCOSE SERPL-MCNC: 147 MG/DL (ref 65–140)
GLUCOSE SERPL-MCNC: 154 MG/DL (ref 65–140)
GLUCOSE SERPL-MCNC: 192 MG/DL (ref 65–140)
GLUCOSE SERPL-MCNC: 52 MG/DL (ref 65–140)
GLUCOSE SERPL-MCNC: 63 MG/DL (ref 65–99)
HCT VFR BLD AUTO: 29.5 % (ref 42–47)
HGB BLD-MCNC: 9.9 G/DL (ref 14–18)
LYMPHOCYTES # BLD AUTO: 1.4 THOUSANDS/ΜL (ref 0.6–4.47)
LYMPHOCYTES NFR BLD AUTO: 19 % (ref 21–51)
MAGNESIUM SERPL-MCNC: 1.6 MG/DL (ref 1.9–2.7)
MCH RBC QN AUTO: 30.5 PG (ref 26–34)
MCHC RBC AUTO-ENTMCNC: 33.6 G/DL (ref 31–37)
MCV RBC AUTO: 91 FL (ref 81–99)
MONOCYTES # BLD AUTO: 0.6 THOUSAND/ΜL (ref 0.17–1.22)
MONOCYTES NFR BLD AUTO: 8 % (ref 2–12)
NEUTROPHILS # BLD AUTO: 5.4 THOUSANDS/ΜL (ref 1.4–6.5)
NEUTS SEG NFR BLD AUTO: 70 % (ref 42–75)
P AXIS: 90 DEGREES
PHOSPHATE SERPL-MCNC: 3.3 MG/DL (ref 3–5.5)
PLATELET # BLD AUTO: 212 THOUSANDS/UL (ref 149–390)
PMV BLD AUTO: 8.4 FL (ref 8.6–11.7)
POTASSIUM SERPL-SCNC: 3.3 MMOL/L (ref 3.5–5.5)
PR INTERVAL: 162 MS
PROCALCITONIN SERPL-MCNC: 0.07 NG/ML
PROCALCITONIN SERPL-MCNC: <0.05 NG/ML
PROT SERPL-MCNC: 5.9 G/DL (ref 6.4–8.9)
QRS AXIS: -58 DEGREES
QRSD INTERVAL: 98 MS
QT INTERVAL: 372 MS
QTC INTERVAL: 455 MS
RBC # BLD AUTO: 3.25 MILLION/UL (ref 4.3–5.9)
SALMONELLA DNA SPEC QL NAA+PROBE: NORMAL
SHIGA TOXIN STX GENE SPEC NAA+PROBE: NORMAL
SHIGELLA DNA SPEC QL NAA+PROBE: NORMAL
SODIUM SERPL-SCNC: 142 MMOL/L (ref 134–143)
T WAVE AXIS: 88 DEGREES
VENTRICULAR RATE: 90 BPM
WBC # BLD AUTO: 7.7 THOUSAND/UL (ref 4.8–10.8)

## 2020-03-04 PROCEDURE — 84100 ASSAY OF PHOSPHORUS: CPT | Performed by: HOSPITALIST

## 2020-03-04 PROCEDURE — 83735 ASSAY OF MAGNESIUM: CPT | Performed by: HOSPITALIST

## 2020-03-04 PROCEDURE — 97167 OT EVAL HIGH COMPLEX 60 MIN: CPT

## 2020-03-04 PROCEDURE — 85025 COMPLETE CBC W/AUTO DIFF WBC: CPT | Performed by: HOSPITALIST

## 2020-03-04 PROCEDURE — 80053 COMPREHEN METABOLIC PANEL: CPT | Performed by: HOSPITALIST

## 2020-03-04 PROCEDURE — 93010 ELECTROCARDIOGRAM REPORT: CPT | Performed by: INTERNAL MEDICINE

## 2020-03-04 PROCEDURE — 99233 SBSQ HOSP IP/OBS HIGH 50: CPT | Performed by: NURSE PRACTITIONER

## 2020-03-04 PROCEDURE — 84145 PROCALCITONIN (PCT): CPT | Performed by: HOSPITALIST

## 2020-03-04 PROCEDURE — 82948 REAGENT STRIP/BLOOD GLUCOSE: CPT

## 2020-03-04 RX ORDER — MAGNESIUM SULFATE HEPTAHYDRATE 40 MG/ML
2 INJECTION, SOLUTION INTRAVENOUS ONCE
Status: COMPLETED | OUTPATIENT
Start: 2020-03-04 | End: 2020-03-04

## 2020-03-04 RX ORDER — POTASSIUM CHLORIDE 20 MEQ/1
40 TABLET, EXTENDED RELEASE ORAL 2 TIMES DAILY
Status: DISCONTINUED | OUTPATIENT
Start: 2020-03-04 | End: 2020-03-08 | Stop reason: HOSPADM

## 2020-03-04 RX ADMIN — MAGNESIUM SULFATE IN WATER 2 G: 40 INJECTION, SOLUTION INTRAVENOUS at 10:36

## 2020-03-04 RX ADMIN — HEPARIN SODIUM 5000 UNITS: 5000 INJECTION, SOLUTION INTRAVENOUS; SUBCUTANEOUS at 22:10

## 2020-03-04 RX ADMIN — TAMSULOSIN HYDROCHLORIDE 0.4 MG: 0.4 CAPSULE ORAL at 15:31

## 2020-03-04 RX ADMIN — Medication 1 CAPSULE: at 15:31

## 2020-03-04 RX ADMIN — AMLODIPINE BESYLATE 10 MG: 5 TABLET ORAL at 09:06

## 2020-03-04 RX ADMIN — Medication 125 MG: at 10:32

## 2020-03-04 RX ADMIN — POTASSIUM CHLORIDE 40 MEQ: 1500 TABLET, EXTENDED RELEASE ORAL at 10:32

## 2020-03-04 RX ADMIN — COLCHICINE 0.3 MG: 0.6 TABLET, FILM COATED ORAL at 09:07

## 2020-03-04 RX ADMIN — INSULIN LISPRO 1 UNITS: 100 INJECTION, SOLUTION INTRAVENOUS; SUBCUTANEOUS at 11:57

## 2020-03-04 RX ADMIN — ASPIRIN 81 MG 81 MG: 81 TABLET ORAL at 09:06

## 2020-03-04 RX ADMIN — Medication 125 MG: at 15:31

## 2020-03-04 RX ADMIN — HEPARIN SODIUM 5000 UNITS: 5000 INJECTION, SOLUTION INTRAVENOUS; SUBCUTANEOUS at 05:07

## 2020-03-04 RX ADMIN — SODIUM CHLORIDE 75 ML/HR: 0.9 INJECTION, SOLUTION INTRAVENOUS at 23:36

## 2020-03-04 RX ADMIN — Medication 125 MG: at 22:09

## 2020-03-04 RX ADMIN — HEPARIN SODIUM 5000 UNITS: 5000 INJECTION, SOLUTION INTRAVENOUS; SUBCUTANEOUS at 15:30

## 2020-03-04 RX ADMIN — VITAMIN D, TAB 1000IU (100/BT) 1000 UNITS: 25 TAB at 09:06

## 2020-03-04 RX ADMIN — POTASSIUM CHLORIDE 40 MEQ: 1500 TABLET, EXTENDED RELEASE ORAL at 18:21

## 2020-03-04 RX ADMIN — PANTOPRAZOLE SODIUM 40 MG: 40 TABLET, DELAYED RELEASE ORAL at 05:08

## 2020-03-04 RX ADMIN — Medication 125 MG: at 05:07

## 2020-03-04 NOTE — ASSESSMENT & PLAN NOTE
· Follows with Dr Terrell Coleman  · Patient on lanreotide injections  · Has been having increasing diarrhea during previous hospitalizations also

## 2020-03-04 NOTE — H&P
H&P- Nancie Armstrong 1940, 79 y o  male MRN: [id number]    Unit/Bed#: -01 Encounter: [id number]    Primary Care Provider: Ramon Jones DO   Date and time admitted to hospital: 3/3/2020  2:38 PM        * Diarrhea of presumed infectious origin  Assessment & Plan  · Recurrence with history of C diff infection  · Check stool studies  · Prophylactic vancomycin  · Patient with hypotension secondary to volume depletion    Essential hypertension with hypotension earlier  Assessment & Plan  · Patient with hypotension earlier likely secondary to volume depletion-improved with hydration  · Hold parameters on medication    Weakness generalized  Assessment & Plan  · With hypotension earlier at office visit has resolved continue IV hydration  · PT OT eval is for discharge planning    Stage 4 chronic kidney disease ([facility] [address] )  Assessment & Plan  · Creatinine seems to be in the 2-2 4 range  · Monitor    Moderate protein-calorie malnutrition ([facility] [address] )  Assessment & Plan  Malnutrition Findings:     chronic illness with neuroendocrine tumor       BMI Findings: Body mass index is 21 81 kg/m²         Chronic indwelling Haile catheter  Assessment & Plan  · Secondary to urinary retention  · Continue medications    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 [facility])  Assessment & Plan  · Follows with Dr David Hendricks  · Patient on lanreotide injections  · Has been having increasing diarrhea during previous hospitalizations also    Type 2 diabetes 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, with long-term current use of insulin [facility])  Assessment & Plan  Lab Results   Component Value Date    HGBA1C 8 1 (H) 06/14/2019       Recent Labs     03/03/20  2036   POCGLU 169*       Blood Sugar Average: Last 72 hrs:  (P) 169   · Continue Lantus and insulin coverage as needed    VTE Prophylaxis: Heparin  Code Status: full code  POLST: There is no POLST form on file for this patient (pre-hospital)  Discussion with patient    Anticipated Length of Stay:  Patient will be admitted on an Inpatient basis with an anticipated length of stay of  > 2 midnights  Justification for Hospital Stay: stool testing, follow up labs, IVF      Chief Complaint:   Weakness, low BP    History of Present Illness:    Corin Greenwood is a 79 y o  male who presents with weakness and low BP  Patient was seen at Hematology today and BP was 80/40 has been having diarrhea for several days and had 1 episode in Hematology office  Feeling very weak, SOB  Has been having multiple bouts of diarrhea, has been taking imodium at times  Has been eating ok  Had episode of diarrhea in room, reported stools collected  Due for injection on 3/11/2020  Review of Systems:  Review of Systems   Constitutional: Positive for fatigue  Negative for chills and fever  HENT: Negative for rhinorrhea, sneezing, sore throat and trouble swallowing  Eyes: Negative for discharge and redness  Respiratory: Positive for shortness of breath  Negative for cough  Cardiovascular: Negative for chest pain and leg swelling  Gastrointestinal: Positive for diarrhea  Negative for abdominal pain, nausea and vomiting  Genitourinary: Negative for hematuria  Chronic garcia   Musculoskeletal: Negative for back pain and neck pain  Skin: Negative for rash and wound  Neurological: Positive for weakness and light-headedness  Negative for dizziness and headaches  Psychiatric/Behavioral: Negative for agitation and confusion         Past Medical and Surgical History:   Past Medical History:   Diagnosis Date    Acute pancreatitis without infection or necrosis 9/26/2019    Anemia of chronic renal failure     BPH (benign prostatic hyperplasia)     Cancer (HCC)     liver cancer    Cardiac disease     Chronic kidney disease, stage 3 (HCC)     Coronary artery disease     Diabetes mellitus ([address] )     DJD (degenerative joint disease)     Essential hypertension     Gait disturbance     Generalized weakness     GERD (gastroesophageal reflux disease)     Gout     History of transfusion     Hydronephrosis     Hyperlipidemia     Hypertension     Liver cancer ([facility] [address] )     Pressure injury of skin     Proteinuria     Renal disorder     Retention of urine     Thrombophlebitis migrans     Type 2 diabetes mellitus (HCC)     Type 2 diabetes 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, with long-term current use of insulin [facility] ) 1/23/2019       Past Surgical History:   Procedure Laterality Date    CHOLECYSTECTOMY LAPAROSCOPIC N/A 2/7/2020    Procedure: CHOLECYSTECTOMY LAPAROSCOPIC;  Surgeon: Hallie Samaniego MD;  Location: [address] [place], [place] [address]o [facility] MAIN OR;  Service: General    CORONARY ANGIOPLASTY WITH STENT PLACEMENT      IR IMAGE GUIDED BIOPSY/ASPIRATION LIVER  1/24/2019    IR TUBE PLACEMENT ROQUE  9/6/2019       Meds/Allergies:  Prior to Admission medications    Medication Sig Start Date End Date Taking?  Authorizing Provider   acetaminophen (TYLENOL) 325 mg tablet Take by mouth as needed   Yes Historical Provider, MD   amLODIPine (NORVASC) 10 mg tablet Take 10 mg by mouth daily   Yes Historical Provider, MD   aspirin 81 MG tablet Take 81 mg by mouth daily   Yes Historical Provider, MD   b complex-vitamin C-folic acid (NEPHROCAPS) 1 mg capsule Take 1 capsule by mouth daily with dinner 2/28/20  Yes Paras Holman MD   BD INSULIN SYRINGE U/F 30G X 1/2" 0 3 ML MISC  3/2/20  Yes Historical Provider, MD   cholecalciferol (VITAMIN D3) 1,000 units tablet Take 1,000 Units by mouth daily   Yes Historical Provider, MD   colchicine (COLCRYS) 0 6 mg tablet Take 0 5 tablets (0 3 mg total) by mouth daily 2/20/20 5/20/20 Yes Hallie Samaniego MD   insulin detemir (LEVEMIR) 100 units/mL subcutaneous injection Inject 10 Units under the skin daily at bedtime   Yes Historical Provider, MD   insulin lispro (HumaLOG) 100 units/mL injection Inject under the skin 3 (three) times a day before meals   Yes Historical Provider, MD   Insulin Syringe-Needle U-100 30G X 1/2" 1 ML MISC by Does not apply route 3 (three) times a day 12/26/19  Yes Sakshi Granda MD   methenamine hippurate (HIPREX) 1 g tablet Take 1 tablet (1 g total) by mouth 3 (three) times a day 2/28/20  Yes Sakshi Granda MD   simethicone (MYLICON) 80 mg chewable tablet Chew 1 tablet (80 mg total) every 6 (six) hours as needed for flatulence 11/18/19  Yes Sydna Bosworth, DO   tamsulosin (FLOMAX) 0 4 mg Take 1 capsule (0 4 mg total) by mouth daily with dinner 2/7/19  Yes Matt Tse MD   cholestyramine sugar free (QUESTRAN LIGHT) 4 g packet Take 1 packet (4 g total) by mouth 2 (two) times a day Hold if you are constipated  Patient not taking: Reported on 3/3/2020 2/20/20 4/20/20  Katerine Romero MD   oxyCODONE-acetaminophen (PERCOCET) 5-325 mg per tablet Take 1 tablet by mouth every 4 (four) hours as needed for moderate pain    Historical Provider, MD   pantoprazole (PROTONIX) 40 mg tablet Take 1 tablet (40 mg total) by mouth daily in the early morning 2/8/19   Matt Tse MD   [facility]) 125 MG capsule  2/20/20   Historical Provider, MD     I have reviewed home medications with patient personally  Allergies: No Known Allergies    Social History:  Marital Status:     Occupation: retired  Patient Pre-hospital Living Situation: home with significant other  Patient Pre-hospital Level of Mobility: mobile with walker  Patient Pre-hospital Diet Restrictions: none  Substance Use History:     Social History     Substance and Sexual Activity   Alcohol Use Never    Frequency: Never     Social History     Tobacco Use   Smoking Status Never Smoker   Smokeless Tobacco Never Used     Social History     Substance and Sexual Activity   Drug Use Never       Family History:  I have reviewed the patients family history    Physical Exam:   Vitals:   Blood Pressure: 135/65 (03/03/20 1843)  Pulse: 79 (03/03/20 2157)  Temperature: 97 5 °F (36 4 °C) (03/03/20 1843)  Temp Source: Temporal (03/03/20 1843)  Respirations: 18 (03/03/20 2157)  Height: 5' 8" (172 7 cm) (03/03/20 1843)  Weight - Scale: 65 1 kg (143 lb 8 3 oz) (03/03/20 1951)  SpO2: 97 % (03/03/20 2157)    Physical Exam   Constitutional: He is oriented to person, place, and time  He appears well-developed and well-nourished  Chronically ill appearing elderly  male     HENT:   Head: Normocephalic and atraumatic  Eyes: Conjunctivae and EOM are normal  Right eye exhibits no discharge  Left eye exhibits no discharge  Neck: Normal range of motion  No tracheal deviation present  Cardiovascular: Normal rate, regular rhythm and normal heart sounds  Exam reveals no gallop and no friction rub  No murmur heard  Pulmonary/Chest: Effort normal and breath sounds normal  No respiratory distress  He has no wheezes  He has no rales  Abdominal: Soft  Bowel sounds are normal  He exhibits no distension and no mass  There is tenderness  There is no guarding  Musculoskeletal: Normal range of motion  He exhibits no edema, tenderness or deformity  Neurological: He is alert and oriented to person, place, and time  Skin: Skin is warm and dry  No rash noted  No erythema  There is pallor  Psychiatric: He has a normal mood and affect  His behavior is normal  Judgment and thought content normal    Nursing note and vitals reviewed  Additional Data:   Lab Results: I have personally reviewed pertinent reports        Results from last 7 days   Lab Units 03/03/20  1608   WBC Thousand/uL 8 00   HEMOGLOBIN g/dL 11 2*   HEMATOCRIT % 33 4*   PLATELETS Thousands/uL 244   NEUTROS PCT % 80*   LYMPHS PCT % 13*   MONOS PCT % 6   EOS PCT % 1     Results from last 7 days   Lab Units 03/03/20  1608   POTASSIUM mmol/L 3 8   CHLORIDE mmol/L 105   CO2 mmol/L 20*   BUN mg/dL 29*   CREATININE mg/dL 2 41*   CALCIUM mg/dL 8 8   ALK PHOS U/L 128   ALT U/L 17   AST U/L 17     Results from last 7 days   Lab Units 03/03/20  1608   INR  1 03     Results from last 7 days   Lab Units 03/03/20 2036   POC GLUCOSE mg/dl 169*       Imaging: formal read pending  XR chest 2 views    (Results Pending)     [facility] Records Reviewed: Yes     ** Please Note: This note has been constructed using a voice recognition system   **

## 2020-03-04 NOTE — ASSESSMENT & PLAN NOTE
· Recurrence with history of C diff infection  · Check stool studies  · Prophylactic vancomycin  · Patient with hypotension secondary to volume depletion

## 2020-03-04 NOTE — ASSESSMENT & PLAN NOTE
Malnutrition Findings:     chronic illness with neuroendocrine tumor       BMI Findings: Body mass index is 21 81 kg/m²

## 2020-03-04 NOTE — ASSESSMENT & PLAN NOTE
· Follows with Dr Gretta Epley  · Patient on lanreotide injections  · Has been having increasing diarrhea during previous hospitalizations also

## 2020-03-04 NOTE — OCCUPATIONAL THERAPY NOTE
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)    Pressure injury of right heel, unstageable ([address] )    Atherosclerosis of artery of extremity with ulceration (HCC)    Carcinoid syndrome (HCC)    Chronic indwelling Haile catheter    Moderate protein-calorie malnutrition (HCC)    Biliary sludge    Malignant neuroendocrine neoplasm (HCC)    Cholecystostomy tube dysfunction    Acute pancreatitis without infection or necrosis    UTI due to extended-spectrum beta lactamase (ESBL) producing Escherichia coli    Hypomagnesemia    Anemia    Acute cholecystitis    Shortness of breath    Goals of care, counseling/discussion    Hypernatremia    Hyperparathyroid bone disease (HCC)    Stage 4 chronic kidney disease (HCC)    Diarrhea of presumed infectious origin       Past Medical History:   Diagnosis Date    Acute pancreatitis without infection or necrosis 9/26/2019    Anemia of chronic renal failure     BPH (benign prostatic hyperplasia)     Cancer (HCC)     liver cancer    Cardiac disease     Chronic kidney disease, stage 3 (HCC)     Coronary artery disease     Diabetes mellitus (HCC)     DJD (degenerative joint disease)     Essential hypertension     Gait disturbance     Generalized weakness     GERD (gastroesophageal reflux disease)     Gout     History of transfusion     Hydronephrosis     Hyperlipidemia     Hypertension     Liver cancer ([address] )     Pressure injury of skin     Proteinuria     Renal disorder     Retention of urine     Thrombophlebitis migrans     Type 2 diabetes mellitus (HCC)     Type 2 diabetes 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, with long-term current use of insulin ([facility] [address] ) 1/23/2019       Past Surgical History:   Procedure Laterality Date    CHOLECYSTECTOMY LAPAROSCOPIC N/A 2/7/2020    Procedure: CHOLECYSTECTOMY LAPAROSCOPIC;  Surgeon: Sydney Sams MD;  Location: [address] [place] [address] OR;  Service: General    CORONARY ANGIOPLASTY WITH STENT PLACEMENT      IR IMAGE GUIDED BIOPSY/ASPIRATION LIVER  1/24/2019    IR TUBE PLACEMENT ROQUE  9/6/2019 03/04/20 1344   Note Type   Note type Eval only   Restrictions/Precautions   Weight Bearing Precautions Per Order No   Other Precautions Contact/isolation; Bed Alarm;Multiple lines; Fall Risk   Pain Assessment   Pain Assessment No/denies pain   Pain Score No Pain   Home Living   Type of Home House   Home Layout Performs ADLs on one level; Able to live on main level with bedroom/bathroom; One level;Stairs to enter with rails  (2 BETH)   Bathroom Shower/Tub Walk-in shower   Bathroom Toilet Raised   Bathroom Equipment Grab bars in shower; Toilet raiser;Commode; Shower chair   [address]   Prior Function   Level of [address] with ADLs and functional mobility   Lives With Significant other   Receives Help From Home health;Friend(s)  (HHA 2x/week, Home OT/PT 2/week for 1 hour)   ADL Assistance Independent   IADLs Needs assistance   Falls in the last 6 months 0   Vocational Retired   Comments Pt recently admitted to hospital on 1/10/2020 due to generalized weakness  Per chart review, PT/OT recommended STR but patient and family refused  Patient reports receiving Home OT/PT 2x/week for one hour each day     Lifestyle   Autonomy Patient reporting being independent with ADLs, ambulatory with RW and lives with significant other in a one level house with 2 BETH   Reciprocal Relationships supportive girlfriend    Intrinsic Gratification enjoys going to [employer]    ADL   Eating Assistance 6  Modified independent   Grooming Assistance 6  Modified Independent   UB Bathing Assistance 5  Supervision/Setup   LB Bathing Assistance 3  Moderate Assistance   [address] 4  [address] 3  Moderate [address]  3  Moderate Assistance   Bed Mobility   Supine to Sit 4  Minimal assistance   Additional items Assist x 1;HOB elevated; Bedrails; Increased time required;Verbal cues   Sit to Supine 4  Minimal assistance   Additional items Assist x 1; Increased time required;Verbal cues;LE management   Transfers   Sit to Stand 4  Minimal assistance   Additional items Assist x 1; Increased time required;Verbal cues   Stand to Sit 4  Minimal assistance   Additional items Assist x 1; Increased time required;Verbal cues   Balance   Static Sitting Fair +   Dynamic Sitting Fair   Static Standing Fair -   Dynamic Standing Poor +   Activity Tolerance   Activity Tolerance Patient limited by fatigue   Nurse Made Aware KALYN Schafer made aware of outcomes    RUE Assessment   RUE Assessment WFL  (grossly 4-/5 MMT)   LUE Assessment   LUE Assessment WFL  (grossly 4-/5 MMT)   Hand Function   Gross Motor Coordination Functional   Fine Motor Coordination Functional   Cognition   Overall Cognitive Status WFL   Arousal/Participation Alert; Responsive; Cooperative   Attention Within functional limits   Orientation Level Oriented X4   Memory Decreased short term memory   Following Commands Follows one step commands without difficulty   Comments Mini-Cog assessment performed  Version 1 was given  Patient able to recall 1/3 words  Patient able to draw a normal clock with the correct time  Total score of test was 3/5 indicating  further screening of cognition is recommended  Cognition Assessment Tools   (Mini-cog )   Score 3   Assessment   Limitation Decreased ADL status; Decreased UE strength;Decreased Safe judgement during ADL;Decreased cognition;Decreased endurance;Decreased self-care trans;Decreased high-level ADLs   Prognosis Fair   Assessment Pt is a 79 y o  male who was admitted to [address] [place] [address] on 3/3/2020 with Diarrhea of presumed infectious origin  At this time, patient is also affected by the comorbidities of: generalized weakness, DM2, HTN, hyperlipidemia, malnutrition, and CKD  Additionally, patient is affected by the following personal factors:steps to enter environment, difficulty performing ADLS and difficulty performing IADLS   Orders placed for OT evaluation/treatment with up and OOB as tolerated orders  Prior to admission, Patient reporting being independent with ADLs, ambulatory with RW and lives with significant other in a one level house with 2 BETH  Upon OT evaluation, patient requires supervision/set-up and minimum assist for UB ADLs and moderate assist for LB ADLs  Occupational performance is affected by the following deficits: decreased strength, decreased balance, decreased tolerance, impaired memory, impaired sequencing and impaired problem solving  Based on the following information, patient would benefit from continued skilled OT treatment while in the hospital to address deficits and maximize level of functional independence with ADL's and functional mobility  Occupational Performance areas to address include: grooming, bathing/shower, toilet hygiene, dressing, functional mobility and clothing management  From OT standpoint, recommendation at time of d/c would be short term rehab  Goals   Patient Goals to get some sleep    Plan   Treatment Interventions ADL retraining;Functional transfer training;UE strengthening/ROM; Endurance training;Cognitive reorientation;Patient/family training;Equipment evaluation/education; Compensatory technique education; Activityengagement   Goal Expiration Date 03/14/20   OT Treatment Day 0   OT Frequency 3-5x/wk   Recommendation   OT Discharge Recommendation Short Term Rehab   OT - OK to Discharge Yes  (Once medically cleared )   Barthel Index   Feeding 10   Bathing 0   Grooming Score 5   Dressing Score 5   Bladder Score 0   Bowels Score 10   Toilet Use Score 5   Transfers (Bed/Chair) Score 10   Mobility (Level Surface) Score 0   Stairs Score 0   Barthel Index Score 45     GOALS:    *ADL transfers with (I) for inc'd independence with ADLs/purposeful tasks    *UB ADL with (I) for inc'd independence with self cares    *LB ADL with (I) using AE prn for inc'd independence with self cares    *Toileting with (I) for clothing management and hygiene for return to PLOF with personal care    *Increase static stand balance to good- and dyn stand balance to fair+ for inc'd safety with standing purposeful tasks    *Increase stand tolerance x5 m for inc'd tolerance with standing purposeful tasks    *Participate in 10m UE therex to increase overall stamina/activity tolerance for purposeful tasks    *Bed mobility- (I) for inc'd independence to manage own comfort and initiate EOB & OOB purposeful tasks    *Patient will verbalize 3 safety awareness/ principles to prevent falls in the home setting  *Patient will increase OOB/sitting tolerance to 2-4 hours per day to increase participation in self-care and leisure tasks with no s/s of exertion           Maria Isabel Rogers MS, OTR/L

## 2020-03-04 NOTE — UTILIZATION REVIEW
1  Certification Information  Reference Number: [id number]   Status: Pended   Message: This case requires further review  You will be contacted if additional information is needed     Review Decision Reason: Requires Medical Review

## 2020-03-04 NOTE — UTILIZATION REVIEW
Initial Clinical Review    Admission: Date/Time/Statement: Admission Orders (From admission, onward)     Ordered        03/03/20 1750  Inpatient Admission (expected length of stay for this patient Order details is greater than two midnights)  Once                   Orders Placed This Encounter   Procedures    Inpatient Admission (expected length of stay for this patient Order details is greater than two midnights)     Standing Status:   Standing     Number of Occurrences:   1     Order Specific Question:   Admitting Physician     Answer:   [place] Patient [[id number]]     Order Specific Question:   Level of Care     Answer:   Med Surg [[id number]]     Order Specific Question:   Estimated length of stay     Answer:   More than 2 Midnights     Order Specific Question:   Certification     Answer:   I certify that inpatient services are medically necessary for this patient for a duration of greater than two midnights  See H&P and MD Progress Notes for additional information about the patient's course of treatment  ED Arrival Information     Expected Arrival Acuity Means of Arrival Escorted By Service Admission Type    3/3/2020 14:11 3/3/2020 14:38 Urgent Ambulance [facility] General Medicine Urgent    Arrival Complaint    mets to liver        Chief Complaint   Patient presents with    Weakness - Generalized    Shortness of Breath    Diarrhea    Possible UTI     garcia cath present and noted to have cloudy urine     Assessment/Plan:   79 y o  male who presents to er via ems with weakness and low BP  Patient was seen at Hematology today and BP was 80/40 has been having diarrhea for several days and had 1 episode in Hematology office  Feeling very weak, SOB  Has been having multiple bouts of diarrhea, has been taking imodium at times  Hx  pancreatitis, anemia, bph, liver ca, ckd stg 3,d m, djd, htn, gerd, hld, gout, hydronephrosis, chronic garcia, c-diff   Presents weak, dizzy & lightheaded with sob, cough, trouble swallowing, diarrhea, cloudy urine in garcia  Admission work-up showing elevated bun/cr, +u/a  Admitted to inpatient status for diarrhea, uti  Started on ivf,  ivabt given in er, started on vanco with stool studies, urine & blood cultures pending     ED Triage Vitals [03/03/20 1443]   Temperature Pulse Respirations Blood Pressure SpO2   97 6 °F (36 4 °C) 92 20 150/78 100 %      Temp Source Heart Rate Source Patient Position - Orthostatic VS BP Location FiO2 (%)   Temporal Monitor Sitting Left arm --      Pain Score       No Pain        Wt Readings from Last 1 Encounters:   03/04/20 68 5 kg (151 lb 0 2 oz)     Additional Vital Signs:   03/03/20 1843  97 5 °F (36 4 °C)  86  20  135/65  --  99 %  None (Room air)  Sitting   03/03/20 1730  --  86  27Abnormal   146/91  114  99 %  None (Room air)  Lying   03/03/20 1700  --  87  19  159/77  111  99 %  None (Room air)  Lying   03/03/20 1447  --  --  --  --  --  --  None (Room air)  --   03/03/20 1443  97 6 °F (36 4 °C)  92  20  150/78  107  100 %  None (Room air)  Sitting   Pertinent Labs/Diagnostic Test Results:   Results from last 7 days   Lab Units 03/04/20  0508 03/03/20  1608   WBC Thousand/uL 7 70 8 00   HEMOGLOBIN g/dL 9 9* 11 2*   HEMATOCRIT % 29 5* 33 4*   PLATELETS Thousands/uL 212 244   NEUTROS ABS Thousands/µL 5 40 6 40     Results from last 7 days   Lab Units 03/04/20  0508 03/03/20  1608   SODIUM mmol/L 142 137   POTASSIUM mmol/L 3 3* 3 8   CHLORIDE mmol/L 111* 105   CO2 mmol/L 21 20*   ANION GAP mmol/L 10 12   BUN mg/dL 27* 29*   CREATININE mg/dL 2 19* 2 41*   EGFR ml/min/1 73sq m 27 24   CALCIUM mg/dL 8 4* 8 8   MAGNESIUM mg/dL 1 6*  --    PHOSPHORUS mg/dL 3 3  --      Results from last 7 days   Lab Units 03/04/20  0508 03/03/20  1608   AST U/L 22 17   ALT U/L 16 17   ALK PHOS U/L 112 128   TOTAL PROTEIN g/dL 5 9* 6 9   ALBUMIN g/dL 3 2* 3 6   TOTAL BILIRUBIN mg/dL 0 30 0 40     Results from last 7 days   Lab Units 03/04/20  0808 03/04/20  6366 03/03/20  2036   POC GLUCOSE mg/dl 102 52* 169*     Results from last 7 days   Lab Units 03/04/20  0508 03/03/20  1608   GLUCOSE RANDOM mg/dL 63* 198*     BETA-HYDROXYBUTYRATE   Date Value Ref Range Status   02/05/2019 0 11 0 02 - 0 27 mmol/L Final      Results from last 7 days   Lab Units 03/03/20  1608   TROPONIN I ng/mL <0 03     Results from last 7 days   Lab Units 03/03/20  1608   PROTIME seconds 12 0   INR  1 03   PTT seconds 29     Results from last 7 days   Lab Units 03/03/20  1608   PROCALCITONIN ng/ml 0 07     Results from last 7 days   Lab Units 03/03/20  1632   CLARITY UA  Cloudy*   COLOR UA  Yellow   SPEC GRAV UA  1 020   PH UA  6 0   GLUCOSE UA mg/dl Negative   KETONES UA mg/dl Negative   BLOOD UA  1+*   PROTEIN UA mg/dl 1+*   NITRITE UA  Positive*   BILIRUBIN UA  Negative   UROBILINOGEN UA E U /dl 0 2   LEUKOCYTES UA  3+*   WBC UA /hpf Innumerable*   RBC UA /hpf 1-2*   BACTERIA UA /hpf Occasional   EPITHELIAL CELLS WET PREP /hpf Occasional     Results from last 7 days   Lab Units 03/03/20  1610   INFLUENZA A PCR  None Detected   INFLUENZA B PCR  None Detected   RSV PCR  None Detected     Results from last 7 days   Lab Units 03/03/20  1610 03/03/20  1608   BLOOD CULTURE  Received in Microbiology Lab  Culture in Progress  Received in Microbiology Lab  Culture in Progress       3/3  Cxr= No acute cardiopulmonary disease    ED Treatment:   Medication Administration from 03/03/2020 1411 to 03/03/2020 1836       Date/Time Order Dose Route Action     03/03/2020 1623 sodium chloride 0 9 % bolus 500 mL 500 mL Intravenous New Bag     03/03/2020 1800 cefTRIAXone (ROCEPHIN) IVPB (premix) 1,000 mg 1,000 mg Intravenous New Bag        Past Medical History:   Diagnosis Date    Acute pancreatitis without infection or necrosis 9/26/2019    Anemia of chronic renal failure     BPH (benign prostatic hyperplasia)     Cancer (HCC)     liver cancer    Cardiac disease     Chronic kidney disease, stage 3 ([facility] [address] )     Coronary artery disease     Diabetes mellitus ([place] [address] )     DJD (degenerative joint disease)     Essential hypertension     Gait disturbance     Generalized weakness     GERD (gastroesophageal reflux disease)     Gout     History of transfusion     Hydronephrosis     Hyperlipidemia     Hypertension     Liver cancer ([place] [address] )     Pressure injury of skin     Proteinuria     Renal disorder     Retention of urine     Thrombophlebitis migrans     Type 2 diabetes mellitus (HCC)     Type 2 diabetes 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, with long-term current use of insulin ([place] [address] ) 1/23/2019     Present on Admission:   Weakness generalized   Essential hypertension with hypotension earlier   Mixed hyperlipidemia   Moderate protein-calorie malnutrition (HCC)   Stage 4 chronic kidney disease ([place] [address] )   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 ([place] [address] )   Diarrhea of presumed infectious origin  Admitting Diagnosis: UTI (urinary tract infection) [N39 0]  Weakness [R53 1]  Age/Sex: 79 y o  male  Admission Orders:  Contact isolation  Pt/ot eval & tx  Scd/foot pumps  accuchecks with coverage scale  Haile cath  Scheduled Medications:  amLODIPine 10 mg Oral Daily   aspirin 81 mg Oral Daily   b complex-vitamin C-folic acid 1 capsule Oral Daily With Dinner   cholecalciferol 1,000 Units Oral Daily   colchicine 0 3 mg Oral Daily   heparin (porcine) 5,000 Units Subcutaneous Q8H [address]   insulin detemir 10 Units Subcutaneous HS   insulin lispro 1-5 Units Subcutaneous TID AC   magnesium sulfate 2 g Intravenous Once   pantoprazole 40 mg Oral Early Morning   potassium chloride 40 mEq Oral BID   tamsulosin 0 4 mg Oral Daily With Dinner   vancomycin 125 mg Oral Q6H [address]     Continuous IV Infusions:  sodium chloride 75 mL/hr Intravenous Continuous     PRN Meds:  acetaminophen 650 mg Oral Q6H PRN   ondansetron 4 mg Intravenous Q6H PRN     Network Utilization Review Department  [email or web address]  ATTENTION: Please call with any questions or concerns to [phone] and carefully listen to the prompts so that you are directed to the right person  All voicemails are confidential   Sheyla Rico all requests for admission clinical reviews, approved or denied determinations and any other requests to dedicated fax number below belonging to the campus where the patient is receiving treatment   List of dedicated fax numbers for the Facilities:  [address] DENIALS (Administrative/Medical Necessity) [phone]   [address] (Maternity/NICU/Pediatrics) [phone]   Marce Sauceda [phone]   Safia Brooks [phone]   Bryan Royal [phone]   Aarti Carson [phone]   [address] [place] [address] [phone]   [facility]  [phone]   [address] [facility]  [address] [facility] [address] [facility] [phone]

## 2020-03-04 NOTE — ASSESSMENT & PLAN NOTE
Lab Results   Component Value Date    HGBA1C 8 1 (H) 06/14/2019       Recent Labs     03/03/20 2036   POCGLU 169*       Blood Sugar Average: Last 72 hrs:  (P) 169   · Continue Lantus and insulin coverage as needed

## 2020-03-04 NOTE — ASSESSMENT & PLAN NOTE
· With hypotension earlier at office visit has resolved continue IV hydration  · PT OT eval is for discharge planning

## 2020-03-04 NOTE — PLAN OF CARE
Problem: OCCUPATIONAL THERAPY ADULT  Goal: Performs self-care activities at highest level of function for planned discharge setting  See evaluation for individualized goals  Description  Treatment Interventions: ADL retraining, Functional transfer training, UE strengthening/ROM, Endurance training, Cognitive reorientation, Patient/family training, Equipment evaluation/education, Compensatory technique education, Activityengagement          See flowsheet documentation for full assessment, interventions and recommendations  Note:   Limitation: Decreased ADL status, Decreased UE strength, Decreased Safe judgement during ADL, Decreased cognition, Decreased endurance, Decreased self-care trans, Decreased high-level ADLs  Prognosis: Fair  Assessment: Pt is a 79 y o  male who was admitted to [address] [place] [address] on 3/3/2020 with Diarrhea of presumed infectious origin  At this time, patient is also affected by the comorbidities of: generalized weakness, DM2, HTN, hyperlipidemia, malnutrition, and CKD  Additionally, patient is affected by the following personal factors:steps to enter environment, difficulty performing ADLS and difficulty performing IADLS   Orders placed for OT evaluation/treatment with up and OOB as tolerated orders  Prior to admission, Patient reporting being independent with ADLs, ambulatory with RW and lives with significant other in a one level house with [facility]  Upon OT evaluation, patient requires supervision/set-up and minimum assist for UB ADLs and moderate assist for LB ADLs  Occupational performance is affected by the following deficits: decreased strength, decreased balance, decreased tolerance, impaired memory, impaired sequencing and impaired problem solving  Based on the following information, patient would benefit from continued skilled OT treatment while in the hospital to address deficits and maximize level of functional independence with ADL's and functional mobility  Occupational Performance areas to address include: grooming, bathing/shower, toilet hygiene, dressing, functional mobility and clothing management  From OT standpoint, recommendation at time of d/c would be short term rehab       OT Discharge Recommendation: Short Term Rehab  OT - OK to Discharge: Yes(Once medically cleared )     Roxana Granado, OT

## 2020-03-04 NOTE — ASSESSMENT & PLAN NOTE
· Patient with hypotension earlier likely secondary to volume depletion-improved with hydration  · Hold parameters on medication

## 2020-03-05 LAB
ANION GAP SERPL CALCULATED.3IONS-SCNC: 8 MMOL/L (ref 4–13)
BASOPHILS # BLD AUTO: 0.1 THOUSANDS/ΜL (ref 0–0.1)
BASOPHILS NFR BLD AUTO: 1 % (ref 0–2)
BUN SERPL-MCNC: 22 MG/DL (ref 7–25)
CALCIUM SERPL-MCNC: 8.4 MG/DL (ref 8.6–10.5)
CHLORIDE SERPL-SCNC: 110 MMOL/L (ref 98–107)
CO2 SERPL-SCNC: 22 MMOL/L (ref 21–31)
CREAT SERPL-MCNC: 2.16 MG/DL (ref 0.7–1.3)
EOSINOPHIL # BLD AUTO: 0.1 THOUSAND/ΜL (ref 0–0.61)
EOSINOPHIL NFR BLD AUTO: 2 % (ref 0–5)
ERYTHROCYTE [DISTWIDTH] IN BLOOD BY AUTOMATED COUNT: 16.1 % (ref 11.5–14.5)
GFR SERPL CREATININE-BSD FRML MDRD: 28 ML/MIN/1.73SQ M
GLUCOSE SERPL-MCNC: 148 MG/DL (ref 65–140)
GLUCOSE SERPL-MCNC: 150 MG/DL (ref 65–99)
GLUCOSE SERPL-MCNC: 193 MG/DL (ref 65–140)
GLUCOSE SERPL-MCNC: 231 MG/DL (ref 65–140)
HCT VFR BLD AUTO: 31.8 % (ref 42–47)
HGB BLD-MCNC: 10.7 G/DL (ref 14–18)
LYMPHOCYTES # BLD AUTO: 1 THOUSANDS/ΜL (ref 0.6–4.47)
LYMPHOCYTES NFR BLD AUTO: 15 % (ref 21–51)
MAGNESIUM SERPL-MCNC: 2 MG/DL (ref 1.9–2.7)
MCH RBC QN AUTO: 30.8 PG (ref 26–34)
MCHC RBC AUTO-ENTMCNC: 33.7 G/DL (ref 31–37)
MCV RBC AUTO: 92 FL (ref 81–99)
MONOCYTES # BLD AUTO: 0.5 THOUSAND/ΜL (ref 0.17–1.22)
MONOCYTES NFR BLD AUTO: 7 % (ref 2–12)
NEUTROPHILS # BLD AUTO: 5.1 THOUSANDS/ΜL (ref 1.4–6.5)
NEUTS SEG NFR BLD AUTO: 75 % (ref 42–75)
PLATELET # BLD AUTO: 221 THOUSANDS/UL (ref 149–390)
PMV BLD AUTO: 8.5 FL (ref 8.6–11.7)
POTASSIUM SERPL-SCNC: 4.4 MMOL/L (ref 3.5–5.5)
RBC # BLD AUTO: 3.47 MILLION/UL (ref 4.3–5.9)
SODIUM SERPL-SCNC: 140 MMOL/L (ref 134–143)
WBC # BLD AUTO: 6.8 THOUSAND/UL (ref 4.8–10.8)

## 2020-03-05 PROCEDURE — 85025 COMPLETE CBC W/AUTO DIFF WBC: CPT | Performed by: NURSE PRACTITIONER

## 2020-03-05 PROCEDURE — 80048 BASIC METABOLIC PNL TOTAL CA: CPT | Performed by: NURSE PRACTITIONER

## 2020-03-05 PROCEDURE — 83735 ASSAY OF MAGNESIUM: CPT | Performed by: NURSE PRACTITIONER

## 2020-03-05 PROCEDURE — 97163 PT EVAL HIGH COMPLEX 45 MIN: CPT

## 2020-03-05 PROCEDURE — 99232 SBSQ HOSP IP/OBS MODERATE 35: CPT | Performed by: NURSE PRACTITIONER

## 2020-03-05 PROCEDURE — 97535 SELF CARE MNGMENT TRAINING: CPT

## 2020-03-05 PROCEDURE — 82948 REAGENT STRIP/BLOOD GLUCOSE: CPT

## 2020-03-05 RX ADMIN — ASPIRIN 81 MG 81 MG: 81 TABLET ORAL at 08:57

## 2020-03-05 RX ADMIN — Medication 125 MG: at 03:37

## 2020-03-05 RX ADMIN — Medication 125 MG: at 21:58

## 2020-03-05 RX ADMIN — COLCHICINE 0.3 MG: 0.6 TABLET, FILM COATED ORAL at 08:56

## 2020-03-05 RX ADMIN — VITAMIN D, TAB 1000IU (100/BT) 1000 UNITS: 25 TAB at 08:56

## 2020-03-05 RX ADMIN — INSULIN LISPRO 2 UNITS: 100 INJECTION, SOLUTION INTRAVENOUS; SUBCUTANEOUS at 12:28

## 2020-03-05 RX ADMIN — AMLODIPINE BESYLATE 10 MG: 5 TABLET ORAL at 08:55

## 2020-03-05 RX ADMIN — PANTOPRAZOLE SODIUM 40 MG: 40 TABLET, DELAYED RELEASE ORAL at 05:10

## 2020-03-05 RX ADMIN — HEPARIN SODIUM 5000 UNITS: 5000 INJECTION, SOLUTION INTRAVENOUS; SUBCUTANEOUS at 05:10

## 2020-03-05 RX ADMIN — HEPARIN SODIUM 5000 UNITS: 5000 INJECTION, SOLUTION INTRAVENOUS; SUBCUTANEOUS at 21:58

## 2020-03-05 RX ADMIN — POTASSIUM CHLORIDE 40 MEQ: 1500 TABLET, EXTENDED RELEASE ORAL at 08:55

## 2020-03-05 RX ADMIN — Medication 125 MG: at 18:37

## 2020-03-05 RX ADMIN — TAMSULOSIN HYDROCHLORIDE 0.4 MG: 0.4 CAPSULE ORAL at 18:36

## 2020-03-05 RX ADMIN — INSULIN LISPRO 1 UNITS: 100 INJECTION, SOLUTION INTRAVENOUS; SUBCUTANEOUS at 18:36

## 2020-03-05 RX ADMIN — POTASSIUM CHLORIDE 40 MEQ: 1500 TABLET, EXTENDED RELEASE ORAL at 18:36

## 2020-03-05 RX ADMIN — Medication 125 MG: at 09:01

## 2020-03-05 RX ADMIN — Medication 1 CAPSULE: at 18:35

## 2020-03-05 RX ADMIN — INSULIN DETEMIR 10 UNITS: 100 INJECTION, SOLUTION SUBCUTANEOUS at 21:58

## 2020-03-05 RX ADMIN — HEPARIN SODIUM 5000 UNITS: 5000 INJECTION, SOLUTION INTRAVENOUS; SUBCUTANEOUS at 14:27

## 2020-03-05 RX ADMIN — SODIUM CHLORIDE 75 ML/HR: 0.9 INJECTION, SOLUTION INTRAVENOUS at 12:28

## 2020-03-05 NOTE — PLAN OF CARE
Problem: PAIN - ADULT  Goal: Verbalizes/displays adequate comfort level or baseline comfort level  Description  Interventions:  - Encourage patient to monitor pain and request assistance  - Assess pain using appropriate pain scale  - Administer analgesics based on type and severity of pain and evaluate response  - Implement non-pharmacological measures as appropriate and evaluate response  - Consider cultural and social influences on pain and pain management  - Notify physician/advanced practitioner if interventions unsuccessful or patient reports new pain  Outcome: Progressing     Problem: INFECTION - ADULT  Goal: Absence or prevention of progression during hospitalization  Description  INTERVENTIONS:  - Assess and monitor for signs and symptoms of infection  - Monitor lab/diagnostic results  - Monitor all insertion sites, i e  indwelling lines, tubes, and drains  - Monitor endotracheal if appropriate and nasal secretions for changes in amount and color  - [place] appropriate cooling/warming therapies per order  - Administer medications as ordered  - Instruct and encourage patient and family to use good hand hygiene technique  - Identify and instruct in appropriate isolation precautions for identified infection/condition  Outcome: Progressing

## 2020-03-05 NOTE — PLAN OF CARE
Problem: Potential for Falls  Goal: Patient will remain free of falls  Description  INTERVENTIONS:  - Assess patient frequently for physical needs  -  Identify cognitive and physical deficits and behaviors that affect risk of falls    -  [place] fall precautions as indicated by assessment   - Educate patient/family on patient safety including physical limitations  - Instruct patient to call for assistance with activity based on assessment  - Modify environment to reduce risk of injury  - Consider OT/PT consult to assist with strengthening/mobility  Outcome: Progressing     Problem: PAIN - ADULT  Goal: Verbalizes/displays adequate comfort level or baseline comfort level  Description  Interventions:  - Encourage patient to monitor pain and request assistance  - Assess pain using appropriate pain scale  - Administer analgesics based on type and severity of pain and evaluate response  - Implement non-pharmacological measures as appropriate and evaluate response  - Consider cultural and social influences on pain and pain management  - Notify physician/advanced practitioner if interventions unsuccessful or patient reports new pain  Outcome: Progressing     Problem: INFECTION - ADULT  Goal: Absence or prevention of progression during hospitalization  Description  INTERVENTIONS:  - Assess and monitor for signs and symptoms of infection  - Monitor lab/diagnostic results  - Monitor all insertion sites, i e  indwelling lines, tubes, and drains  - Monitor endotracheal if appropriate and nasal secretions for changes in amount and color  - [place] appropriate cooling/warming therapies per order  - Administer medications as ordered  - Instruct and encourage patient and family to use good hand hygiene technique  - Identify and instruct in appropriate isolation precautions for identified infection/condition  Outcome: Progressing  Goal: Absence of fever/infection during neutropenic period  Description  INTERVENTIONS:  - Monitor WBC    Outcome: Progressing     Problem: SAFETY ADULT  Goal: Patient will remain free of falls  Description  INTERVENTIONS:  - Assess patient frequently for physical needs  -  Identify cognitive and physical deficits and behaviors that affect risk of falls    -  [place] fall precautions as indicated by assessment   - Educate patient/family on patient safety including physical limitations  - Instruct patient to call for assistance with activity based on assessment  - Modify environment to reduce risk of injury  - Consider OT/PT consult to assist with strengthening/mobility  Outcome: Progressing  Goal: Maintain or return to baseline ADL function  Description  INTERVENTIONS:  -  Assess patient's ability to carry out ADLs; assess patient's baseline for ADL function and identify physical deficits which impact ability to perform ADLs (bathing, care of mouth/teeth, toileting, grooming, dressing, etc )  - Assess/evaluate cause of self-care deficits   - Assess range of motion  - Assess patient's mobility; develop plan if impaired  - Assess patient's need for assistive devices and provide as appropriate  - Encourage maximum independence but intervene and supervise when necessary  - Involve family in performance of ADLs  - Assess for home care needs following discharge   - Consider OT consult to assist with ADL evaluation and planning for discharge  - Provide patient education as appropriate  Outcome: Progressing  Goal: Maintain or return mobility status to optimal level  Description  INTERVENTIONS:  - Assess patient's baseline mobility status (ambulation, transfers, stairs, etc )    - Identify cognitive and physical deficits and behaviors that affect mobility  - Identify mobility aids required to assist with transfers and/or ambulation (gait belt, sit-to-stand, lift, walker, cane, etc )  - [place] fall precautions as indicated by assessment  - Record patient progress and toleration of activity level on Mobility SBAR; progress patient to next Phase/Stage  - Instruct patient to call for assistance with activity based on assessment  - Consider rehabilitation consult to assist with strengthening/weightbearing, etc   Outcome: Progressing     Problem: DISCHARGE PLANNING  Goal: Discharge to home or other facility with appropriate resources  Description  INTERVENTIONS:  - Identify barriers to discharge w/patient and caregiver  - Arrange for needed discharge resources and transportation as appropriate  - Identify discharge learning needs (meds, wound care, etc )  - Arrange for interpretive services to assist at discharge as needed  - Refer to Case Management Department for coordinating discharge planning if the patient needs post-hospital services based on physician/advanced practitioner order or complex needs related to functional status, cognitive ability, or social support system  Outcome: Progressing     Problem: Knowledge Deficit  Goal: Patient/family/caregiver demonstrates understanding of disease process, treatment plan, medications, and discharge instructions  Description  Complete learning assessment and assess knowledge base  Interventions:  - Provide teaching at level of understanding  - Provide teaching via preferred learning methods  Outcome: Progressing     Problem: Nutrition/Hydration-ADULT  Goal: Nutrient/Hydration intake appropriate for improving, restoring or maintaining nutritional needs  Description  Monitor and assess patient's nutrition/hydration status for malnutrition  Collaborate with interdisciplinary team and initiate plan and interventions as ordered  Monitor patient's weight and dietary intake as ordered or per policy  Utilize nutrition screening tool and intervene as necessary  Determine patient's food preferences and provide high-protein, high-caloric foods as appropriate       INTERVENTIONS:  - Monitor oral intake, urinary output, labs, and treatment plans  - Assess nutrition and hydration status and recommend course of action  - Evaluate amount of meals eaten  - Assist patient with eating if necessary   - Allow adequate time for meals  - Recommend/ encourage appropriate diets, oral nutritional supplements, and vitamin/mineral supplements  - Order, calculate, and assess calorie counts as needed  - Recommend, monitor, and adjust tube feedings and TPN/PPN based on assessed needs  - Assess need for intravenous fluids  - Provide specific nutrition/hydration education as appropriate  - Include patient/family/caregiver in decisions related to nutrition  Outcome: Progressing     Problem: DISCHARGE PLANNING - CARE MANAGEMENT  Goal: Discharge to post-acute care or home with appropriate resources  Description  INTERVENTIONS:  - Conduct assessment to determine patient/family and health care team treatment goals, and need for post-acute services based on payer coverage, community resources, and patient preferences, and barriers to discharge  - Address psychosocial, clinical, and financial barriers to discharge as identified in assessment in conjunction with the patient/family and health care team  - Arrange appropriate level of post-acute services according to patient's   needs and preference and payer coverage in collaboration with the physician and health care team  - Communicate with and update the patient/family, physician, and health care team regarding progress on the discharge plan  - Arrange appropriate transportation to post-acute venues    Pt's goal is to return home with SO and c   Outcome: Progressing     Problem: Prexisting or High Potential for Compromised Skin Integrity  Goal: Skin integrity is maintained or improved  Description  INTERVENTIONS:  - Identify patients at risk for skin breakdown  - Assess and monitor skin integrity  - Assess and monitor nutrition and hydration status  - Monitor labs   - Assess for incontinence   - Turn and reposition patient  - Assist with mobility/ambulation  - Relieve pressure over bony prominences  - Avoid friction and shearing  - Provide appropriate hygiene as needed including keeping skin clean and dry  - Evaluate need for skin moisturizer/barrier cream  - Collaborate with interdisciplinary team   - Patient/family teaching  - Consider wound care consult   Outcome: Progressing

## 2020-03-05 NOTE — PLAN OF CARE
Problem: OCCUPATIONAL THERAPY ADULT  Goal: Performs self-care activities at highest level of function for planned discharge setting  See evaluation for individualized goals  Description  Treatment Interventions: ADL retraining, Functional transfer training, UE strengthening/ROM, Endurance training, Cognitive reorientation, Patient/family training, Equipment evaluation/education, Compensatory technique education, Activityengagement          See flowsheet documentation for full assessment, interventions and recommendations  Outcome: Progressing  Note:   Limitation: Decreased ADL status, Decreased UE strength, Decreased Safe judgement during ADL, Decreased cognition, Decreased endurance, Decreased self-care trans, Decreased high-level ADLs  Prognosis: Fair  Assessment: Patient participated in Skilled OT session this date with interventions consisting of ADL re training with the use of correct body mechnaics, Energy Conservation techniques and  therapeutic activities to: increase activity tolerance   Patient agreeable to OT treatment session, upon arrival patient was found seated in bed (supported)  Patient requiring verbal cues for pacing thru activity steps and frequent rest periods, and self-care assist  As noted in flow sheet  Patient continues to be functioning below baseline level, occupational performance remains limited secondary to factors listed above and increased risk for falls and injury  From OT standpoint, recommendation at time of d/c would be STR vs Home Health Agency/ Home OT services pending progress  Patient to benefit from continued Occupational Therapy treatment while in the hospital to address deficits as defined above and maximize level of functional independence with ADLs and functional mobility  OT Discharge Recommendation: Short Term Rehab vs  [facility]/Home OT pending progress    OT - OK to Discharge: Yes(Once medically cleared )  TAWNYA Naranjo/IZZY

## 2020-03-05 NOTE — OCCUPATIONAL THERAPY NOTE
03/05/20 1015   Restrictions/Precautions   Weight Bearing Precautions Per Order No   Other Precautions Contact/isolation;Multiple lines; Fall Risk   Lifestyle   Reciprocal Relationships patient took phone call from significant other regarding acquiring a new lap top computer for home use    Pain Assessment   Pain Assessment No/denies pain   ADL   Where Assessed Other (Comment)  (seated in bed, supported)   Grooming Comments patient brushed teeth, post set-up    UB Bathing Assistance 5  Supervision/Setup   UB Bathing Deficit Setup;Supervision/safety; Increased time to complete   LB Bathing Assistance 3  Moderate Assistance   LB Bathing Deficit Setup;Verbal cueing;Supervision/safety; Increased time to complete; Buttocks;Right lower leg including foot; Left lower leg including foot   UB Dressing Assistance 3  Moderate Assistance   UB Dressing Comments [facility]   Bed Mobility   Rolling L 6  Modified independent   Additional items Bedrails   Additional Comments required Assist  for boost toward head of bed   Coordination   Gross Motor appears WFL   Dexterity appears WFL   Cognition   Overall Cognitive Status WFL   Arousal/Participation Alert; Cooperative   Comments able to smile and exhibit sense of humor despite his serious condition(s)   Activity Tolerance   Activity Tolerance Patient limited by fatigue   Assessment   Assessment Patient participated in Skilled OT session this date with interventions consisting of ADL re training with the use of correct body mechnaics, Energy Conservation techniques and  therapeutic activities to: increase activity tolerance   Patient agreeable to OT treatment session, upon arrival patient was found seated in bed (supported)  Patient requiring verbal cues for pacing thru activity steps and frequent rest periods, and self-care assist  As noted in flow sheet    Patient continues to be functioning below baseline level, occupational performance remains limited secondary to factors listed above and increased risk for falls and injury  From OT standpoint, recommendation at time of d/c would be STR vs Home Health Agency/ Home OT services pending progress  Patient to benefit from continued Occupational Therapy treatment while in the hospital to address deficits as defined above and maximize level of functional independence with ADLs and functional mobility  Plan   Treatment Interventions ADL retraining;Functional transfer training;Energy conservation; Activityengagement   Goal Expiration Date 03/14/20   OT Treatment Day 172 TAWNYA Goodman/L

## 2020-03-05 NOTE — PROGRESS NOTES
Progress Note - Sameer Stevenson 1940, 79 y o  male MRN: [id number]    Unit/Bed#: -01 Encounter: [id number]    Primary Care Provider: Quan Blankenship DO   Date and time admitted to hospital: 3/3/2020  2:38 PM        * Diarrhea of presumed infectious origin  Assessment & Plan  · Recurrence with history of C diff infection  · Check stool studies  - positive proceeded  · Prophylactic vancomycin p o   · is improving    Type 2 diabetes 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, with long-term current use of insulin [facility])  Assessment & Plan  Lab Results   Component Value Date    HGBA1C 8 1 (H) 06/14/2019       Recent Labs     03/04/20  0653 03/04/20  0808 03/04/20  1127 03/04/20  1638   POCGLU 52* 102 192* 154*       Blood Sugar Average: Last 72 hrs:  (P) 133 8   · Continue Lantus and insulin coverage as needed    Weakness generalized  Assessment & Plan  · With hypotension earlier at office visit has resolved continue IV hydration  · PT OT eval is for discharge planning    Essential hypertension with hypotension earlier  Assessment & Plan  · Patient with hypotension earlier likely secondary to volume depletion-improved with hydration  · Hold parameters on medication  · Patient is back to baseline    Mixed hyperlipidemia  Assessment & Plan  · Patient has been taken off his home medications      Chronic indwelling Haile catheter  Assessment & Plan  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  · Patient had IV Rocephin  · Continue medications    Acute hypokalemia  Assessment & Plan  · Level was found to be 3 3  · Patient will be started on 40 mEq p o  B i d  potassium supplement  · Will monitor labs    Stage 4 chronic kidney disease (HCC)  Assessment & Plan  · Creatinine seems to be in the 2-2 4 range  · Monitor    Hypomagnesemia  Assessment & Plan  · Level was found to be 1 6  · Patient will be given to g IV bolus  · Monitor labs    Moderate protein-calorie malnutrition (HCC)  Assessment & Plan  Malnutrition Findings:     chronic illness with neuroendocrine tumor       BMI Findings: Body mass index is 22 96 kg/m²  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 [facility])  Assessment & Plan  · Follows with Dr Liz Castrejon  · Patient on lanreotide injections  · Has been having increasing diarrhea during previous hospitalizations also      VTE Pharmacologic Prophylaxis: Pharmacologic: Heparin    Patient Centered Rounds: I have performed bedside rounds with nursing staff today  Discussions with Specialists or Other Care Team Provider:  Nursing, PT OT  Education and Discussions with Family / Patient:  Discussed with the patient and his wife at the bedside    Current Length of Stay: 1 day(s)    Current Patient Status: Inpatient   Certification Statement: The patient will continue to require additional inpatient hospital stay due to Patient is positive for C diff, patient has electrolyte imbalance which will require replacement  Discharge Plan:  Home versus rehab    Code Status: Level 1 - Full Code    Subjective:   Patient is lying in bed  He offers no complaints, however he does state that he still has severe diarrhea  Objective:     Vitals:   Temp (24hrs), Av 2 °F (36 8 °C), Min:97 8 °F (36 6 °C), Max:98 8 °F (37 1 °C)    Temp:  [97 8 °F (36 6 °C)-98 8 °F (37 1 °C)] 97 8 °F (36 6 °C)  HR:  [69-79] 69  Resp:  [18] 18  BP: (168-172)/(70-83) 172/82  SpO2:  [97 %-99 %] 98 %  Body mass index is 22 96 kg/m²  Input and Output Summary (last 24 hours): Intake/Output Summary (Last 24 hours) at 3/4/2020 1911  Last data filed at 3/4/2020 1236  Gross per 24 hour   Intake --   Output 2200 ml   Net -2200 ml       Physical Exam:     Physical Exam   Constitutional: He is oriented to person, place, and time  He appears cachectic  He is cooperative  He has a sickly appearance     Frail   HENT:   Head: Normocephalic and atraumatic  Nose: Nose normal    Mouth/Throat: Oropharynx is clear and moist and mucous membranes are normal    Eyes: Conjunctivae and EOM are normal    Neck: Full passive range of motion without pain  Cardiovascular: Normal rate, regular rhythm, normal heart sounds and normal pulses  Pulmonary/Chest: Effort normal  He has decreased breath sounds in the right lower field and the left lower field  He has wheezes  Abdominal: Soft  Normal appearance and bowel sounds are normal    Genitourinary:   Genitourinary Comments: Chronic indwelling Haile   Musculoskeletal:   Generalized weakness   Neurological: He is alert and oriented to person, place, and time  Periods of forgetfulness   Skin: Skin is warm  Psychiatric: He has a normal mood and affect  His speech is normal and behavior is normal        Additional Data:     Labs:    Results from last 7 days   Lab Units 03/04/20  0508   WBC Thousand/uL 7 70   HEMOGLOBIN g/dL 9 9*   HEMATOCRIT % 29 5*   PLATELETS Thousands/uL 212   NEUTROS PCT % 70   LYMPHS PCT % 19*   MONOS PCT % 8   EOS PCT % 2     Results from last 7 days   Lab Units 03/04/20  0508   POTASSIUM mmol/L 3 3*   CHLORIDE mmol/L 111*   CO2 mmol/L 21   BUN mg/dL 27*   CREATININE mg/dL 2 19*   CALCIUM mg/dL 8 4*   ALK PHOS U/L 112   ALT U/L 16   AST U/L 22     Results from last 7 days   Lab Units 03/03/20  1608   INR  1 03     Results from last 7 days   Lab Units 03/04/20  1638 03/04/20  1127 03/04/20  0808 03/04/20  0653 03/03/20  2036   POC GLUCOSE mg/dl 154* 192* 102 52* 169*           * I Have Reviewed All Lab Data Listed Above  * Additional Pertinent Lab Tests Reviewed: Laura 66 Admission  Reviewed    Imaging:  Imaging Reports Reviewed Today Include:  None    Recent Cultures (last 7 days):     Results from last 7 days   Lab Units 03/03/20  2057 03/03/20  1610 03/03/20  1608   BLOOD CULTURE   --  Received in Microbiology Lab  Culture in Progress  Received in Microbiology Lab  Culture in Progress  C DIFF TOXIN B  Positive*  --   --        Last 24 Hours Medication List:     Current Facility-Administered Medications:  acetaminophen 650 mg Oral Q6H PRN Randal Sweet MD    amLODIPine 10 mg Oral Daily Randal Sweet MD    aspirin 81 mg Oral Daily Randal Sweet MD    b complex-vitamin C-folic acid 1 capsule Oral Daily With 1139 Gerardo Dodson MD    cholecalciferol 1,000 Units Oral Daily Randal Sweet MD    colchicine 0 3 mg Oral Daily Randal Sweet MD    heparin (porcine) 5,000 Units Subcutaneous [facility] Randal Sweet MD    insulin detemir 10 Units Subcutaneous HS Randal Sweet MD    insulin lispro 1-5 Units Subcutaneous TID [facility] Randal Sweet MD    ondansetron 4 mg Intravenous Q6H PRN Randal Sweet MD    pantoprazole 40 mg Oral Early Morning Randal Sweet MD    potassium chloride 40 mEq Oral BID RIKA Harrison    sodium chloride 75 mL/hr Intravenous Continuous Randal Sweet MD Last Rate: 75 mL/hr (03/03/20 1958)   tamsulosin 0 4 mg Oral Daily With 1139 Gerardo Dodson MD    vancomycin 125 mg Oral Q6H 2020 Jayson Brown MD         Today, Patient Was Seen By: RIKA Zavala    ** Please Note: Dictation voice to text software may have been used in the creation of this document   **

## 2020-03-05 NOTE — PHYSICAL THERAPY NOTE
Physical Therapy Evaluation     Patient's Name: Jerry Rosenbaum    Admitting Diagnosis  UTI (urinary tract infection) [N39 0]  Weakness [R53 1]    Problem List  Patient Active Problem List   Diagnosis    Weakness generalized    Type 2 diabetes 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, with long-term current use of insulin (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 [address] )    Pressure injury of right heel, unstageable ([facility] [address] )    Atherosclerosis of artery of extremity with ulceration (HCC)    Carcinoid syndrome (HCC)    Chronic indwelling Haile catheter    Moderate protein-calorie malnutrition (HCC)    Biliary sludge    Malignant neuroendocrine neoplasm ([facility] [address] )    Cholecystostomy tube dysfunction    Acute pancreatitis without infection or necrosis    UTI due to extended-spectrum beta lactamase (ESBL) producing Escherichia coli    Hypomagnesemia    Anemia    Acute cholecystitis    Shortness of breath    Goals of care, counseling/discussion    Hypernatremia    Hyperparathyroid bone disease (HCC)    Stage 4 chronic kidney disease (HCC)    Diarrhea of presumed infectious origin    Acute hypokalemia       Past Medical History  Past Medical History:   Diagnosis Date    Acute pancreatitis without infection or necrosis 9/26/2019    Anemia of chronic renal failure     BPH (benign prostatic hyperplasia)     Cancer (HCC)     liver cancer    Cardiac disease     Chronic kidney disease, stage 3 (HCC)     Coronary artery disease     Diabetes mellitus ([address] )     DJD (degenerative joint disease)     Essential hypertension     Gait disturbance     Generalized weakness     GERD (gastroesophageal reflux disease)     Gout     History of transfusion     Hydronephrosis     Hyperlipidemia     Hypertension     Liver cancer ([facility] [address] )     Pressure injury of skin     Proteinuria     Renal disorder     Retention of urine     Thrombophlebitis migrans     Type 2 diabetes mellitus (HCC)     Type 2 diabetes 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, with long-term current use of insulin ([facility] [address] ) 1/23/2019       Past Surgical History  Past Surgical History:   Procedure Laterality Date    CHOLECYSTECTOMY LAPAROSCOPIC N/A 2/7/2020    Procedure: CHOLECYSTECTOMY LAPAROSCOPIC;  Surgeon: Eri Aguila MD;  Location: [address] [place] [address] OR;  Service: General    CORONARY ANGIOPLASTY WITH STENT PLACEMENT      IR IMAGE GUIDED BIOPSY/ASPIRATION LIVER  1/24/2019    IR TUBE PLACEMENT ROQUE  9/6/2019 03/05/20 1404   Note Type   Note type Eval/Treat   Pain Assessment   Pain Assessment No/denies pain   Pain Score No Pain   Home Living   Type of [address] [place] [address] One level;Performs ADLs on one level; Able to live on main level with bedroom/bathroom;Stairs to enter with rails  (2 BETH)   Bathroom Shower/Tub Walk-in shower   Bathroom Toilet Raised   Bathroom Equipment Grab bars in shower; Shower chair;Commode; Toilet raiser   [address] Walker   Additional Comments pt uses RW at baseline   Prior Function   Level of [place] Independent with ADLs and functional mobility   Lives With Significant other   Receives Help From Home health;Friend(s)  (HHA 2x/week, Home OT/PT 2/week for 1 hour)   ADL Assistance Independent   IADLs Needs assistance   Falls in the last 6 months 0   Vocational Retired   Comments Pt recently admitted to hospital on 1/10/2020 due to generalized weakness  Per chart review, PT/OT recommended STR but patient and family refused  Patient reports receiving Home OT/PT 2x/week for one hour each day  Restrictions/Precautions   Weight Bearing Precautions Per Order No   Other Precautions Contact/isolation; Chair Alarm; Bed Alarm;Multiple lines; Fall Risk   General   Family/Caregiver Present Yes   Cognition   Arousal/Participation Alert   Attention Within functional limits   Orientation Level Oriented X4   Memory Decreased short term memory   Following Commands Follows one step commands without difficulty   Comments pt agreeable to PT session   RLE Assessment   RLE Assessment X  (grossly 3+/5 throughout)   LLE Assessment   LLE Assessment X  (grossly 3+/5 throughout)   Coordination   Movements are Fluid and Coordinated 1   Sensation WFL   Bed Mobility   Supine to Sit 4  Minimal assistance   Additional items Assist x 1;HOB elevated; Bedrails; Increased time required;Verbal cues;LE management   Sit to Supine 4  Minimal assistance   Additional items Assist x 1;HOB elevated; Increased time required;Verbal cues;LE management   Transfers   Sit to Stand 3  Moderate assistance   Additional items Assist x 1; Increased time required;Verbal cues   Stand to Sit 3  Moderate assistance   Additional items Assist x 1; Increased time required;Verbal cues   Ambulation/Elevation   Gait pattern Improper Weight shift;Decreased foot clearance;Shuffling; Short stride; Step to;Excessively slow   Gait Assistance 3  Moderate assist   Additional items Assist x 1;Verbal cues; Tactile cues   Assistive Device Rolling walker   Distance 3 ft sidestepping toward [address] Not tested   Balance   Static Sitting Fair +   Dynamic Sitting Fair   Static Standing Fair -   Dynamic Standing Poor +   Ambulatory Poor +   Endurance Deficit   Endurance Deficit Yes   Activity Tolerance   Activity Tolerance Patient limited by fatigue   Nurse Made Aware Yes, KALYN Tucker verbalized pt appropriate for PT session   Assessment   Prognosis Fair   Problem List Decreased strength;Decreased endurance; Impaired balance;Decreased mobility; Decreased safety awareness   Assessment Pt is 79 y o  male seen for PT evaluation on 3/5/2020 s/p admit to [facility] on 3/3/2020 w/ Diarrhea of presumed infectious origin  PT consulted to assess pt's functional mobility and d/c needs  Order placed for PT eval and tx, w/ up and OOB as tolerated order  Performed at least 2 patient identifiers during session: Name and wristband  Comorbidities affecting pt's physical performance at time of assessment include: generalized weakness, DM2, HTN, hyperlipidemia, malnutrition, and CKD  PTA, patient reporting being independent with ADLs, ambulatory with RW and lives with significant other in a one level house with 2 BETH  Personal factors affecting pt at time of IE include: ambulating w/ assistive device, stairs to enter home, inability to navigate level surfaces w/o external assistance, unable to perform dynamic tasks in community, decreased initiation and engagement, unable to perform physical activity, limited insight into impairments, inability to perform IADLs and inability to perform ADLs  Please find objective findings from PT assessment regarding body systems outlined above with impairments and limitations including weakness, impaired balance, decreased endurance, decreased activity tolerance, decreased functional mobility tolerance, decreased safety awareness and fall risk, as well as mobility assessment (need for cueing for mobility technique)  The following objective measures performed on IE also reveal limitations: Barthel Index: 35/100  Pt's clinical presentation is currently unstable/unpredictable seen in pt's presentation of ongoing medical assessment  Pt to benefit from continued PT tx to address deficits as defined above and maximize level of functional independent mobility and consistency  From PT/mobility standpoint, recommendation at time of d/c would be STR pending progress in order to facilitate return to PLOF     Barriers to Discharge Inaccessible home environment   Goals   Patient Goals "to go home"   STG Expiration Date 03/15/20   Short Term Goal #1 In 7-10 days pt will: Perform bed mobility tasks to Supervision to improve ease of bed mobility  Perform transfers to Supervision to improve ease of transfers  Perform ambulation with supervision and RW for 125 ft to   increase Indep in home environment  Increase dynamic standing balance to F+ to decrease fall risk  Increase BLE strength to 4+/5 to improve functional mobility  Increase OOB activity tolerance to 10 minutes without s/s of exertion to decrease fall risk  Navigate up and down 2 steps so patient can enter and exit home  PT Treatment Day 0   Plan   Treatment/Interventions Functional transfer training;LE strengthening/ROM; Elevations; Therapeutic exercise; Endurance training;Patient/family training;Equipment eval/education; Bed mobility;Gait training;Continued evaluation;Spoke to nursing;Spoke to case management   PT Frequency   (3-5x/wk)   Recommendation   Recommendation Short-term skilled PT   Equipment Recommended Walker  (continue RW use)   PT - OK to Discharge Yes  (when medically cleared, if to STR)   Modified [place] Scale   Modified [place] Scale 4   Barthel Index   Feeding 10   Bathing 0   Grooming Score 5   Dressing Score 5   Bladder Score 0   Bowels Score 5   Toilet Use Score 5   Transfers (Bed/Chair) Score 5   Mobility (Level Surface) Score 0   Stairs Score 0   Barthel Index Score 35         Leo Byrd, PT

## 2020-03-05 NOTE — ASSESSMENT & PLAN NOTE
· Follows with Dr Helen Khan  · Patient on lanreotide injections  · Has been having increasing diarrhea during previous hospitalizations also

## 2020-03-05 NOTE — ASSESSMENT & PLAN NOTE
· Patient with hypotension earlier likely secondary to volume depletion-improved with hydration  · Hold parameters on medication  · Patient is back to baseline

## 2020-03-05 NOTE — ASSESSMENT & PLAN NOTE
Lab Results   Component Value Date    HGBA1C 8 1 (H) 06/14/2019       Recent Labs     03/04/20  0653 03/04/20  0808 03/04/20  1127 03/04/20  1638   POCGLU 52* 102 192* 154*       Blood Sugar Average: Last 72 hrs:  (P) 133 8   · Continue Lantus and insulin coverage as needed

## 2020-03-05 NOTE — ASSESSMENT & PLAN NOTE
· Recurrence with history of C diff infection  · Check stool studies  - positive proceeded  · Prophylactic vancomycin p o   · is improving

## 2020-03-05 NOTE — SOCIAL WORK
Chart reviewed pt is a 30 day readmission, pt was d/c on 2/20/2020, pt had an open cholecystectomy, pt did get his meds and he did go for his follow up appointment, garcia cath was changed before he was d/c last admission, pt is active wit [facility], pt is going to be getting bars and a ramp installed at his home, pt lives with his So and she is his care giver, she stated since he has been home he was doing better with [facility], pt has therapy at home,she does driving, cooking shopping, cleaning and helps with his care, pt has rx plan at [facility], no d/c for today, cnm will continue to follow and reassess for any additional d/c needs, Patient/caregiver received discharge checklist   Content reviewed  Patient/caregiver encouraged to participate in discharge plan of care prior to discharge home  CM reviewed d/c planning process including the following: identifying help at home, patient preference for d/c planning needs, availability of treatment team to discuss questions or concerns patient and/or family may have regarding understanding medications and recognizing signs and symptoms once discharged  CM also encouraged patient to follow up with all recommended appointments after discharge  Patient advised of importance for patient and family to participate in managing patients medical well being

## 2020-03-05 NOTE — PLAN OF CARE
Problem: PHYSICAL THERAPY ADULT  Goal: Performs mobility at highest level of function for planned discharge setting  See evaluation for individualized goals  Description  Treatment/Interventions: Functional transfer training, LE strengthening/ROM, Elevations, Therapeutic exercise, Endurance training, Patient/family training, Equipment eval/education, Bed mobility, Gait training, Continued evaluation, Spoke to nursing, Spoke to case management  Equipment Recommended: Walker(continue RW use)       See flowsheet documentation for full assessment, interventions and recommendations  Note:   Prognosis: Fair  Problem List: Decreased strength, Decreased endurance, Impaired balance, Decreased mobility, Decreased safety awareness  Assessment: Pt is 79 y o  male seen for PT evaluation on 3/5/2020 s/p admit to [address] on 3/3/2020 w/ Diarrhea of presumed infectious origin  PT consulted to assess pt's functional mobility and d/c needs  Order placed for PT eval and tx, w/ up and OOB as tolerated order  Performed at least 2 patient identifiers during session: Name and wristband  Comorbidities affecting pt's physical performance at time of assessment include: generalized weakness, DM2, HTN, hyperlipidemia, malnutrition, and CKD  PTA, patient reporting being independent with ADLs, ambulatory with RW and lives with significant other in a one level house with 2 BETH  Personal factors affecting pt at time of IE include: ambulating w/ assistive device, stairs to enter home, inability to navigate level surfaces w/o external assistance, unable to perform dynamic tasks in community, decreased initiation and engagement, unable to perform physical activity, limited insight into impairments, inability to perform IADLs and inability to perform ADLs   Please find objective findings from PT assessment regarding body systems outlined above with impairments and limitations including weakness, impaired balance, decreased endurance, decreased activity tolerance, decreased functional mobility tolerance, decreased safety awareness and fall risk, as well as mobility assessment (need for cueing for mobility technique)  The following objective measures performed on IE also reveal limitations: Barthel Index: 35/100  Pt's clinical presentation is currently unstable/unpredictable seen in pt's presentation of ongoing medical assessment  Pt to benefit from continued PT tx to address deficits as defined above and maximize level of functional independent mobility and consistency  From PT/mobility standpoint, recommendation at time of d/c would be STR pending progress in order to facilitate return to PLOF  Barriers to Discharge: Inaccessible home environment     Recommendation: Short-term skilled PT     PT - OK to Discharge: Yes(when medically cleared, if to STR)    See flowsheet documentation for full assessment

## 2020-03-05 NOTE — NURSING NOTE
Pt sleeping most of shift, no reports of pain or distress  Contact and Hand hygiene precautions maintained  Several episodes of diarrhea before pt fell asleep  Call bell is within reach

## 2020-03-05 NOTE — PLAN OF CARE
Problem: DISCHARGE PLANNING - CARE MANAGEMENT  Goal: Discharge to post-acute care or home with appropriate resources  Description  INTERVENTIONS:  - Conduct assessment to determine patient/family and health care team treatment goals, and need for post-acute services based on payer coverage, community resources, and patient preferences, and barriers to discharge  - Address psychosocial, clinical, and financial barriers to discharge as identified in assessment in conjunction with the patient/family and health care team  - Arrange appropriate level of post-acute services according to patient's   needs and preference and payer coverage in collaboration with the physician and health care team  - Communicate with and update the patient/family, physician, and health care team regarding progress on the discharge plan  - Arrange appropriate transportation to post-acute venues    Pt's goal is to return home with SO and hhc   Outcome: Progressing

## 2020-03-05 NOTE — ASSESSMENT & PLAN NOTE
Malnutrition Findings:     chronic illness with neuroendocrine tumor       BMI Findings: Body mass index is 22 96 kg/m²

## 2020-03-05 NOTE — ASSESSMENT & PLAN NOTE
· Level was found to be 3 3  · Patient will be started on 40 mEq p o  B i d  potassium supplement  · Will monitor labs

## 2020-03-05 NOTE — ASSESSMENT & PLAN NOTE
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

## 2020-03-06 LAB
GLUCOSE SERPL-MCNC: 118 MG/DL (ref 65–140)
GLUCOSE SERPL-MCNC: 196 MG/DL (ref 65–140)
GLUCOSE SERPL-MCNC: 207 MG/DL (ref 65–140)
GLUCOSE SERPL-MCNC: 212 MG/DL (ref 65–140)
GLUCOSE SERPL-MCNC: 47 MG/DL (ref 65–140)
WBC SPEC QL GRAM STN: ABNORMAL

## 2020-03-06 PROCEDURE — 82948 REAGENT STRIP/BLOOD GLUCOSE: CPT

## 2020-03-06 PROCEDURE — 99231 SBSQ HOSP IP/OBS SF/LOW 25: CPT | Performed by: NURSE PRACTITIONER

## 2020-03-06 PROCEDURE — 97530 THERAPEUTIC ACTIVITIES: CPT

## 2020-03-06 PROCEDURE — 97116 GAIT TRAINING THERAPY: CPT

## 2020-03-06 RX ADMIN — INSULIN LISPRO 2 UNITS: 100 INJECTION, SOLUTION INTRAVENOUS; SUBCUTANEOUS at 11:52

## 2020-03-06 RX ADMIN — INSULIN LISPRO 1 UNITS: 100 INJECTION, SOLUTION INTRAVENOUS; SUBCUTANEOUS at 17:05

## 2020-03-06 RX ADMIN — SODIUM CHLORIDE 75 ML/HR: 0.9 INJECTION, SOLUTION INTRAVENOUS at 06:06

## 2020-03-06 RX ADMIN — POTASSIUM CHLORIDE 40 MEQ: 1500 TABLET, EXTENDED RELEASE ORAL at 09:53

## 2020-03-06 RX ADMIN — Medication 1 CAPSULE: at 17:05

## 2020-03-06 RX ADMIN — VITAMIN D, TAB 1000IU (100/BT) 1000 UNITS: 25 TAB at 09:54

## 2020-03-06 RX ADMIN — ASPIRIN 81 MG 81 MG: 81 TABLET ORAL at 09:54

## 2020-03-06 RX ADMIN — Medication 125 MG: at 21:26

## 2020-03-06 RX ADMIN — TAMSULOSIN HYDROCHLORIDE 0.4 MG: 0.4 CAPSULE ORAL at 17:05

## 2020-03-06 RX ADMIN — HEPARIN SODIUM 5000 UNITS: 5000 INJECTION, SOLUTION INTRAVENOUS; SUBCUTANEOUS at 14:00

## 2020-03-06 RX ADMIN — Medication 125 MG: at 09:54

## 2020-03-06 RX ADMIN — Medication 125 MG: at 17:05

## 2020-03-06 RX ADMIN — INSULIN DETEMIR 10 UNITS: 100 INJECTION, SOLUTION SUBCUTANEOUS at 21:26

## 2020-03-06 RX ADMIN — POTASSIUM CHLORIDE 40 MEQ: 1500 TABLET, EXTENDED RELEASE ORAL at 17:05

## 2020-03-06 RX ADMIN — HEPARIN SODIUM 5000 UNITS: 5000 INJECTION, SOLUTION INTRAVENOUS; SUBCUTANEOUS at 06:03

## 2020-03-06 RX ADMIN — HEPARIN SODIUM 5000 UNITS: 5000 INJECTION, SOLUTION INTRAVENOUS; SUBCUTANEOUS at 21:26

## 2020-03-06 RX ADMIN — Medication 125 MG: at 03:25

## 2020-03-06 RX ADMIN — AMLODIPINE BESYLATE 10 MG: 5 TABLET ORAL at 09:53

## 2020-03-06 RX ADMIN — SODIUM CHLORIDE 75 ML/HR: 0.9 INJECTION, SOLUTION INTRAVENOUS at 19:37

## 2020-03-06 RX ADMIN — COLCHICINE 0.3 MG: 0.6 TABLET, FILM COATED ORAL at 09:54

## 2020-03-06 RX ADMIN — PANTOPRAZOLE SODIUM 40 MG: 40 TABLET, DELAYED RELEASE ORAL at 06:03

## 2020-03-06 NOTE — MALNUTRITION/BMI
This medical record reflects one or more clinical indicators suggestive of malnutrition and/or morbid obesity  Malnutrition Findings:   Malnutrition type: Chronic illness  Degree of Malnutrition: Malnutrition of moderate degree(related to increased energy/protein needs in setting of neuroendocine cancer as evidenced by moderate muscle wasting pectoralis, trapezius, temporalis and moderate fat loss triceps, rib cage and orbital pads treated with regular diet/ensure max supplemen)  Malnutrition Characteristics: Muscle loss, Fat loss    BMI Findings:  BMI Classifications: (23 50)     Body mass index is 23 5 kg/m²  See Nutrition note dated 3/6/20 for additional details  Completed nutrition assessment is viewable in the nutrition documentation

## 2020-03-06 NOTE — SOCIAL WORK
I met with the pt and Isidra Llanes yesterday and also today, they are still declining snf rehab, they want to go home with hhc, pt is active with [facility], pt not stable for d/c as discussed at care coordination rounds today, cm will continue follow and assess for any additional d/c needs

## 2020-03-06 NOTE — PLAN OF CARE
Problem: Potential for Falls  Goal: Patient will remain free of falls  Description  INTERVENTIONS:  - Assess patient frequently for physical needs  -  Identify cognitive and physical deficits and behaviors that affect risk of falls    -  [place] fall precautions as indicated by assessment   - Educate patient/family on patient safety including physical limitations  - Instruct patient to call for assistance with activity based on assessment  - Modify environment to reduce risk of injury  - Consider OT/PT consult to assist with strengthening/mobility  Outcome: Progressing     Problem: PAIN - ADULT  Goal: Verbalizes/displays adequate comfort level or baseline comfort level  Description  Interventions:  - Encourage patient to monitor pain and request assistance  - Assess pain using appropriate pain scale  - Administer analgesics based on type and severity of pain and evaluate response  - Implement non-pharmacological measures as appropriate and evaluate response  - Consider cultural and social influences on pain and pain management  - Notify physician/advanced practitioner if interventions unsuccessful or patient reports new pain  Outcome: Progressing     Problem: INFECTION - ADULT  Goal: Absence or prevention of progression during hospitalization  Description  INTERVENTIONS:  - Assess and monitor for signs and symptoms of infection  - Monitor lab/diagnostic results  - Monitor all insertion sites, i e  indwelling lines, tubes, and drains  - Monitor endotracheal if appropriate and nasal secretions for changes in amount and color  - [place] appropriate cooling/warming therapies per order  - Administer medications as ordered  - Instruct and encourage patient and family to use good hand hygiene technique  - Identify and instruct in appropriate isolation precautions for identified infection/condition  Outcome: Progressing  Goal: Absence of fever/infection during neutropenic period  Description  INTERVENTIONS:  - Monitor WBC    Outcome: Progressing     Problem: SAFETY ADULT  Goal: Patient will remain free of falls  Description  INTERVENTIONS:  - Assess patient frequently for physical needs  -  Identify cognitive and physical deficits and behaviors that affect risk of falls    -  [place] fall precautions as indicated by assessment   - Educate patient/family on patient safety including physical limitations  - Instruct patient to call for assistance with activity based on assessment  - Modify environment to reduce risk of injury  - Consider OT/PT consult to assist with strengthening/mobility  Outcome: Progressing  Goal: Maintain or return to baseline ADL function  Description  INTERVENTIONS:  -  Assess patient's ability to carry out ADLs; assess patient's baseline for ADL function and identify physical deficits which impact ability to perform ADLs (bathing, care of mouth/teeth, toileting, grooming, dressing, etc )  - Assess/evaluate cause of self-care deficits   - Assess range of motion  - Assess patient's mobility; develop plan if impaired  - Assess patient's need for assistive devices and provide as appropriate  - Encourage maximum independence but intervene and supervise when necessary  - Involve family in performance of ADLs  - Assess for home care needs following discharge   - Consider OT consult to assist with ADL evaluation and planning for discharge  - Provide patient education as appropriate  Outcome: Progressing  Goal: Maintain or return mobility status to optimal level  Description  INTERVENTIONS:  - Assess patient's baseline mobility status (ambulation, transfers, stairs, etc )    - Identify cognitive and physical deficits and behaviors that affect mobility  - Identify mobility aids required to assist with transfers and/or ambulation (gait belt, sit-to-stand, lift, walker, cane, etc )  - [place] fall precautions as indicated by assessment  - Record patient progress and toleration of activity level on Mobility SBAR; progress patient to next Phase/Stage  - Instruct patient to call for assistance with activity based on assessment  - Consider rehabilitation consult to assist with strengthening/weightbearing, etc   Outcome: Progressing     Problem: DISCHARGE PLANNING  Goal: Discharge to home or other facility with appropriate resources  Description  INTERVENTIONS:  - Identify barriers to discharge w/patient and caregiver  - Arrange for needed discharge resources and transportation as appropriate  - Identify discharge learning needs (meds, wound care, etc )  - Arrange for interpretive services to assist at discharge as needed  - Refer to Case Management Department for coordinating discharge planning if the patient needs post-hospital services based on physician/advanced practitioner order or complex needs related to functional status, cognitive ability, or social support system  Outcome: Progressing     Problem: Knowledge Deficit  Goal: Patient/family/caregiver demonstrates understanding of disease process, treatment plan, medications, and discharge instructions  Description  Complete learning assessment and assess knowledge base  Interventions:  - Provide teaching at level of understanding  - Provide teaching via preferred learning methods  Outcome: Progressing     Problem: Nutrition/Hydration-ADULT  Goal: Nutrient/Hydration intake appropriate for improving, restoring or maintaining nutritional needs  Description  Monitor and assess patient's nutrition/hydration status for malnutrition  Collaborate with interdisciplinary team and initiate plan and interventions as ordered  Monitor patient's weight and dietary intake as ordered or per policy  Utilize nutrition screening tool and intervene as necessary  Determine patient's food preferences and provide high-protein, high-caloric foods as appropriate       INTERVENTIONS:  - Monitor oral intake, urinary output, labs, and treatment plans  - Assess nutrition and hydration status and recommend course of action  - Evaluate amount of meals eaten  - Assist patient with eating if necessary   - Allow adequate time for meals  - Recommend/ encourage appropriate diets, oral nutritional supplements, and vitamin/mineral supplements  - Order, calculate, and assess calorie counts as needed  - Recommend, monitor, and adjust tube feedings and TPN/PPN based on assessed needs  - Assess need for intravenous fluids  - Provide specific nutrition/hydration education as appropriate  - Include patient/family/caregiver in decisions related to nutrition  Outcome: Progressing     Problem: DISCHARGE PLANNING - CARE MANAGEMENT  Goal: Discharge to post-acute care or home with appropriate resources  Description  INTERVENTIONS:  - Conduct assessment to determine patient/family and health care team treatment goals, and need for post-acute services based on payer coverage, community resources, and patient preferences, and barriers to discharge  - Address psychosocial, clinical, and financial barriers to discharge as identified in assessment in conjunction with the patient/family and health care team  - Arrange appropriate level of post-acute services according to patient's   needs and preference and payer coverage in collaboration with the physician and health care team  - Communicate with and update the patient/family, physician, and health care team regarding progress on the discharge plan  - Arrange appropriate transportation to post-acute venues    Pt's goal is to return home with SO and c   Outcome: Progressing     Problem: Prexisting or High Potential for Compromised Skin Integrity  Goal: Skin integrity is maintained or improved  Description  INTERVENTIONS:  - Identify patients at risk for skin breakdown  - Assess and monitor skin integrity  - Assess and monitor nutrition and hydration status  - Monitor labs   - Assess for incontinence   - Turn and reposition patient  - Assist with mobility/ambulation  - Relieve pressure over bony prominences  - Avoid friction and shearing  - Provide appropriate hygiene as needed including keeping skin clean and dry  - Evaluate need for skin moisturizer/barrier cream  - Collaborate with interdisciplinary team   - Patient/family teaching  - Consider wound care consult   Outcome: Progressing

## 2020-03-06 NOTE — ASSESSMENT & PLAN NOTE
Lab Results   Component Value Date    HGBA1C 8 1 (H) 06/14/2019       Recent Labs     03/05/20  1624 03/06/20  0616 03/06/20  0722 03/06/20  1109   POCGLU 193* 47* 118 212*       Blood Sugar Average: Last 72 hrs:  (P) 870 [id number]   · Continue Lantus and insulin coverage as needed

## 2020-03-06 NOTE — ASSESSMENT & PLAN NOTE
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

## 2020-03-06 NOTE — ASSESSMENT & PLAN NOTE
· Follows with Dr Nicolasa Gay  · Patient on lanreotide injections  · Has been having increasing diarrhea during previous hospitalizations also

## 2020-03-06 NOTE — PLAN OF CARE
Problem: PHYSICAL THERAPY ADULT  Goal: Performs mobility at highest level of function for planned discharge setting  See evaluation for individualized goals  Description  Treatment/Interventions: Functional transfer training, LE strengthening/ROM, Elevations, Therapeutic exercise, Endurance training, Patient/family training, Equipment eval/education, Bed mobility, Gait training, Continued evaluation, Spoke to nursing, Spoke to case management  Equipment Recommended: Walker(continue RW use)       See flowsheet documentation for full assessment, interventions and recommendations  Outcome: Progressing  Note:   Prognosis: Fair  Problem List: Decreased strength, Decreased endurance, Impaired balance, Decreased mobility, Decreased safety awareness  Assessment: Pt seen for PT treatment session this date with interventions consisting of therapeutic activity consisting of training: bed mobility, supine<>sit transfers and sit<>stand transfers and ambulating  Pt tolerated static standing balance of 10 mins  Pt agreeable to PT treatment session upon arrival, pt found supine in bed w/ HOB elevated, in no apparent distress and responsive  In comparison to previous session, pt with improvements in ability to ambulate further distance  Post session: pt returned to bed with all needs in reach Continue to recommend STR at time of d/c in order to maximize pt's functional independence and safety w/ mobility  PT will continue to see pt while here in order to address the deficits listed above and provide interventions consistent w/ POC in effort to achieve STGs  Barriers to Discharge: Inaccessible home environment     Recommendation: Short-term skilled PT     PT - OK to Discharge: Yes(when medically cleared)    See flowsheet documentation for full assessment

## 2020-03-06 NOTE — PLAN OF CARE
Problem: Potential for Falls  Goal: Patient will remain free of falls  Description  INTERVENTIONS:  - Assess patient frequently for physical needs  -  Identify cognitive and physical deficits and behaviors that affect risk of falls    -  [place] fall precautions as indicated by assessment   - Educate patient/family on patient safety including physical limitations  - Instruct patient to call for assistance with activity based on assessment  - Modify environment to reduce risk of injury  - Consider OT/PT consult to assist with strengthening/mobility  Outcome: Progressing     Problem: PAIN - ADULT  Goal: Verbalizes/displays adequate comfort level or baseline comfort level  Description  Interventions:  - Encourage patient to monitor pain and request assistance  - Assess pain using appropriate pain scale  - Administer analgesics based on type and severity of pain and evaluate response  - Implement non-pharmacological measures as appropriate and evaluate response  - Consider cultural and social influences on pain and pain management  - Notify physician/advanced practitioner if interventions unsuccessful or patient reports new pain  Outcome: Progressing     Problem: INFECTION - ADULT  Goal: Absence or prevention of progression during hospitalization  Description  INTERVENTIONS:  - Assess and monitor for signs and symptoms of infection  - Monitor lab/diagnostic results  - Monitor all insertion sites, i e  indwelling lines, tubes, and drains  - Monitor endotracheal if appropriate and nasal secretions for changes in amount and color  - Gray appropriate cooling/warming therapies per order  - Administer medications as ordered  - Instruct and encourage patient and family to use good hand hygiene technique  - Identify and instruct in appropriate isolation precautions for identified infection/condition  Outcome: Progressing  Goal: Absence of fever/infection during neutropenic period  Description  INTERVENTIONS:  - Monitor WBC    Outcome: Progressing     Problem: SAFETY ADULT  Goal: Patient will remain free of falls  Description  INTERVENTIONS:  - Assess patient frequently for physical needs  -  Identify cognitive and physical deficits and behaviors that affect risk of falls    -  [place] fall precautions as indicated by assessment   - Educate patient/family on patient safety including physical limitations  - Instruct patient to call for assistance with activity based on assessment  - Modify environment to reduce risk of injury  - Consider OT/PT consult to assist with strengthening/mobility  Outcome: Progressing  Goal: Maintain or return to baseline ADL function  Description  INTERVENTIONS:  -  Assess patient's ability to carry out ADLs; assess patient's baseline for ADL function and identify physical deficits which impact ability to perform ADLs (bathing, care of mouth/teeth, toileting, grooming, dressing, etc )  - Assess/evaluate cause of self-care deficits   - Assess range of motion  - Assess patient's mobility; develop plan if impaired  - Assess patient's need for assistive devices and provide as appropriate  - Encourage maximum independence but intervene and supervise when necessary  - Involve family in performance of ADLs  - Assess for home care needs following discharge   - Consider OT consult to assist with ADL evaluation and planning for discharge  - Provide patient education as appropriate  Outcome: Progressing  Goal: Maintain or return mobility status to optimal level  Description  INTERVENTIONS:  - Assess patient's baseline mobility status (ambulation, transfers, stairs, etc )    - Identify cognitive and physical deficits and behaviors that affect mobility  - Identify mobility aids required to assist with transfers and/or ambulation (gait belt, sit-to-stand, lift, walker, cane, etc )  - [place] fall precautions as indicated by assessment  - Record patient progress and toleration of activity level on Mobility SBAR; progress patient to next Phase/Stage  - Instruct patient to call for assistance with activity based on assessment  - Consider rehabilitation consult to assist with strengthening/weightbearing, etc   Outcome: Progressing     Problem: DISCHARGE PLANNING  Goal: Discharge to home or other facility with appropriate resources  Description  INTERVENTIONS:  - Identify barriers to discharge w/patient and caregiver  - Arrange for needed discharge resources and transportation as appropriate  - Identify discharge learning needs (meds, wound care, etc )  - Arrange for interpretive services to assist at discharge as needed  - Refer to Case Management Department for coordinating discharge planning if the patient needs post-hospital services based on physician/advanced practitioner order or complex needs related to functional status, cognitive ability, or social support system  Outcome: Progressing     Problem: Knowledge Deficit  Goal: Patient/family/caregiver demonstrates understanding of disease process, treatment plan, medications, and discharge instructions  Description  Complete learning assessment and assess knowledge base  Interventions:  - Provide teaching at level of understanding  - Provide teaching via preferred learning methods  Outcome: Progressing     Problem: Nutrition/Hydration-ADULT  Goal: Nutrient/Hydration intake appropriate for improving, restoring or maintaining nutritional needs  Description  Monitor and assess patient's nutrition/hydration status for malnutrition  Collaborate with interdisciplinary team and initiate plan and interventions as ordered  Monitor patient's weight and dietary intake as ordered or per policy  Utilize nutrition screening tool and intervene as necessary  Determine patient's food preferences and provide high-protein, high-caloric foods as appropriate       INTERVENTIONS:  - Monitor oral intake, urinary output, labs, and treatment plans  - Assess nutrition and hydration status and recommend course of action  - Evaluate amount of meals eaten  - Assist patient with eating if necessary   - Allow adequate time for meals  - Recommend/ encourage appropriate diets, oral nutritional supplements, and vitamin/mineral supplements  - Order, calculate, and assess calorie counts as needed  - Recommend, monitor, and adjust tube feedings and TPN/PPN based on assessed needs  - Assess need for intravenous fluids  - Provide specific nutrition/hydration education as appropriate  - Include patient/family/caregiver in decisions related to nutrition  Outcome: Progressing     Problem: DISCHARGE PLANNING - CARE MANAGEMENT  Goal: Discharge to post-acute care or home with appropriate resources  Description  INTERVENTIONS:  - Conduct assessment to determine patient/family and health care team treatment goals, and need for post-acute services based on payer coverage, community resources, and patient preferences, and barriers to discharge  - Address psychosocial, clinical, and financial barriers to discharge as identified in assessment in conjunction with the patient/family and health care team  - Arrange appropriate level of post-acute services according to patient's   needs and preference and payer coverage in collaboration with the physician and health care team  - Communicate with and update the patient/family, physician, and health care team regarding progress on the discharge plan  - Arrange appropriate transportation to post-acute venues    Pt's goal is to return home with SO and c   Outcome: Progressing     Problem: Prexisting or High Potential for Compromised Skin Integrity  Goal: Skin integrity is maintained or improved  Description  INTERVENTIONS:  - Identify patients at risk for skin breakdown  - Assess and monitor skin integrity  - Assess and monitor nutrition and hydration status  - Monitor labs   - Assess for incontinence   - Turn and reposition patient  - Assist with mobility/ambulation  - Relieve pressure over bony prominences  - Avoid friction and shearing  - Provide appropriate hygiene as needed including keeping skin clean and dry  - Evaluate need for skin moisturizer/barrier cream  - Collaborate with interdisciplinary team   - Patient/family teaching  - Consider wound care consult   Outcome: Progressing

## 2020-03-06 NOTE — PHYSICAL THERAPY NOTE
PHYSICAL THERAPY NOTE          Patient Name: Phong Sanchez  [id number] Date: 3/6/2020     03/06/20 1115   Pain Assessment   Pain Assessment No/denies pain   Pain Score No Pain   Restrictions/Precautions   Weight Bearing Precautions Per Order No   Other Precautions Contact/isolation; Chair Alarm; Bed Alarm;Multiple lines; Fall Risk   General   Family/Caregiver Present Yes   Cognition   Overall Cognitive Status WFL   Arousal/Participation Alert; Responsive; Cooperative   Attention Within functional limits   Orientation Level Oriented X4   Memory Decreased short term memory   Following Commands Follows one step commands without difficulty   Comments pt agreeable to therapy   Subjective   Subjective would like to get up   Bed Mobility   Rolling L 6  Modified independent   Additional items Bedrails   Supine to Sit 4  Minimal assistance   Additional items Assist x 1;HOB elevated; Bedrails; Increased time required;Verbal cues;LE management   Sit to Supine 3  Moderate assistance   Additional items Assist x 1;HOB elevated; Increased time required;LE management;Verbal cues   Transfers   Sit to Stand 4  Minimal assistance   Additional items Assist x 1; Increased time required;Verbal cues   Stand to Sit 4  Minimal assistance   Additional items Assist x 1; Increased time required;Verbal cues   Ambulation/Elevation   Gait pattern Improper Weight shift;Decreased foot clearance;Shuffling; Short stride; Step to;Excessively slow   Gait Assistance 4  Minimal assist   Additional items Assist x 1;Verbal cues   Assistive Device Rolling walker   Distance 40'   Balance   Static Sitting Fair +   Dynamic Sitting Fair   Static Standing Fair -   Dynamic Standing Fair -   Ambulatory Fair -   Endurance Deficit   Endurance Deficit Yes   Activity Tolerance   Activity Tolerance Patient limited by fatigue   Nurse Made Aware Yes   Assessment   Prognosis Fair   Problem List Decreased strength;Decreased endurance; Impaired balance;Decreased mobility; Decreased safety awareness   Assessment Pt seen for PT treatment session this date with interventions consisting of therapeutic activity consisting of training: bed mobility, supine<>sit transfers and sit<>stand transfers and ambulating  Pt tolerated static standing balance of 10 mins  Pt agreeable to PT treatment session upon arrival, pt found supine in bed w/ HOB elevated, in no apparent distress and responsive  In comparison to previous session, pt with improvements in ability to ambulate further distance  Post session: pt returned to bed with all needs in reach Continue to recommend STR at time of d/c in order to maximize pt's functional independence and safety w/ mobility  PT will continue to see pt while here in order to address the deficits listed above and provide interventions consistent w/ POC in effort to achieve STGs  Barriers to Discharge Inaccessible home environment   Goals   Patient Goals to go home   STG Expiration Date 03/15/20   PT Treatment Day 1   Plan   Treatment/Interventions Functional transfer training;LE strengthening/ROM; Elevations; Therapeutic exercise; Endurance training;Patient/family training;Equipment eval/education; Bed mobility;Gait training;Continued evaluation   Progress Progressing toward goals   PT Frequency   (3-5x/wk)   Recommendation   Recommendation Short-term skilled PT   Equipment Recommended Walker   PT - OK to Discharge Yes  (when medically cleared)

## 2020-03-06 NOTE — NURSING NOTE
No changes in the  pts assess since this am, pt presently in bed in no acute distress visiting with family, callbell in reach of the pt and the bed alarm is on

## 2020-03-06 NOTE — ASSESSMENT & PLAN NOTE
Malnutrition Findings:     chronic illness with neuroendocrine tumor       BMI Findings: Body mass index is 23 26 kg/m²

## 2020-03-06 NOTE — ASSESSMENT & PLAN NOTE
Lab Results   Component Value Date    HGBA1C 8 1 (H) 06/14/2019       Recent Labs     03/04/20  2019 03/05/20  0613 03/05/20  1154 03/05/20  1624   POCGLU 147* 148* 231* 193*       Blood Sugar Average: Last 72 hrs:  (P) 154 [id number]   · Continue Lantus and insulin coverage as needed

## 2020-03-06 NOTE — ASSESSMENT & PLAN NOTE
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  · Patient is positive for E coli and ESBL  · Patient had IV Rocephin, currently discontinued  · Continue medications

## 2020-03-06 NOTE — ASSESSMENT & PLAN NOTE
Malnutrition Findings:   Malnutrition type: Chronic illness chronic illness with neuroendocrine tumor  Degree of Malnutrition: Malnutrition of moderate degree(related to increased energy/protein needs in setting of neuroendocine cancer as evidenced by moderate muscle wasting pectoralis, trapezius, temporalis and moderate fat loss triceps, rib cage and orbital pads treated with regular diet/ensure max supplemen)    BMI Findings:  BMI Classifications: (23 50)     Body mass index is 23 5 kg/m²       · Patient will be placed on regular diet with Ensure max supplementation

## 2020-03-06 NOTE — PROGRESS NOTES
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  · Check stool studies  - positive C diff  · Prophylactic vancomycin p o   · is improving    Type 2 diabetes 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, with long-term current use of insulin [facility])  Assessment & Plan  Lab Results   Component Value Date    HGBA1C 8 1 (H) 06/14/2019       Recent Labs     03/04/20  2019 03/05/20  0613 03/05/20  1154 03/05/20  1624   POCGLU 147* 148* 231* 193*       Blood Sugar Average: Last 72 hrs:  (P) 154 [id number]   · Continue Lantus and insulin coverage as needed    Weakness generalized  Assessment & Plan  · With hypotension earlier at office visit has resolved continue IV hydration  · PT OT eval is for discharge planning    Essential hypertension with hypotension earlier  Assessment & Plan  · Patient with hypotension earlier likely secondary to volume depletion-improved with hydration  · Hold parameters on medication  · Patient is back to baseline    Mixed hyperlipidemia  Assessment & Plan  · Patient has been taken off his home medications      Chronic indwelling Haile catheter  Assessment & Plan  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  · Patient had IV Rocephin  · Continue medications    Acute hypokalemia  Assessment & Plan  · Level was found to be 3 3  · Patient will be started on 40 mEq p o  B i d  potassium supplement  · Will monitor labs    Stage 4 chronic kidney disease (HCC)  Assessment & Plan  · Creatinine seems to be in the 2-2 4 range  · Monitor    Hypomagnesemia  Assessment & Plan  · Level was found to be 1 6  · Patient will be given to g Nose normal    Mouth/Throat: Oropharynx is clear and moist and mucous membranes are normal    Eyes: Conjunctivae and EOM are normal    Neck: Full passive range of motion without pain  Cardiovascular: Normal rate, regular rhythm, normal heart sounds and normal pulses  Pulmonary/Chest: Effort normal  He has decreased breath sounds in the right lower field and the left lower field  Abdominal: Soft  Normal appearance and bowel sounds are normal    Patient continues with diarrhea   Genitourinary:   Genitourinary Comments: Patient has chronic indwelling Haile catheter   Musculoskeletal:   Generalized weakness   Neurological: He is alert and oriented to person, place, and time  Periods of forgetfulness   Skin: Skin is warm  Psychiatric: He has a normal mood and affect  His speech is normal and behavior is normal        Additional Data:     Labs:    Results from last 7 days   Lab Units 03/05/20  0441   WBC Thousand/uL 6 80   HEMOGLOBIN g/dL 10 7*   HEMATOCRIT % 31 8*   PLATELETS Thousands/uL 221   NEUTROS PCT % 75   LYMPHS PCT % 15*   MONOS PCT % 7   EOS PCT % 2     Results from last 7 days   Lab Units 03/05/20  0441 03/04/20  0508   POTASSIUM mmol/L 4 4 3 3*   CHLORIDE mmol/L 110* 111*   CO2 mmol/L 22 21   BUN mg/dL 22 27*   CREATININE mg/dL 2 16* 2 19*   CALCIUM mg/dL 8 4* 8 4*   ALK PHOS U/L  --  112   ALT U/L  --  16   AST U/L  --  22     Results from last 7 days   Lab Units 03/03/20  1608   INR  1 03     Results from last 7 days   Lab Units 03/05/20  1624 03/05/20  1154 03/05/20  0613 03/04/20  2019 03/04/20  1638 03/04/20  1127 03/04/20  0808 03/04/20  0653 03/03/20  2036   POC GLUCOSE mg/dl 193* 231* 148* 147* 154* 192* 102 52* 169*           * I Have Reviewed All Lab Data Listed Above  * Additional Pertinent Lab Tests Reviewed:  Laura 65 Admission  Reviewed    Imaging:  Imaging Reports Reviewed Today Include:  None    Recent Cultures (last 7 days):     Results from last 7 days   Lab Units 03/03/20 2057 03/03/20  1632 03/03/20  1610 03/03/20  1608   BLOOD CULTURE   --   --  No Growth at 24 hrs  No Growth at 24 hrs  URINE CULTURE   --  >100,000 cfu/ml Escherichia coli*  --   --    C DIFF TOXIN B  Positive*  --   --   --        Last 24 Hours Medication List:     Current Facility-Administered Medications:  acetaminophen 650 mg Oral Q6H PRN Ar Chun MD    amLODIPine 10 mg Oral Daily Ar Chun MD    aspirin 81 mg Oral Daily Ar Chun MD    b complex-vitamin C-folic acid 1 capsule Oral Daily With 1139 Gerardo Dodson MD    cholecalciferol 1,000 Units Oral Daily Ar Chun MD    colchicine 0 3 mg Oral Daily Ar Chun MD    heparin (porcine) 5,000 Units Subcutaneous [facility] Ar Chun MD    insulin detemir 10 Units Subcutaneous HS Ar Chun MD    insulin lispro 1-5 Units Subcutaneous TID [facility] Ar Chun MD    ondansetron 4 mg Intravenous Q6H PRN Ar Chun MD    pantoprazole 40 mg Oral Early Morning Ar Chun MD    potassium chloride 40 mEq Oral BID RIKA Harrison    sodium chloride 75 mL/hr Intravenous Continuous Ar Chun MD Last Rate: 75 mL/hr (03/05/20 1432)   tamsulosin 0 4 mg Oral Daily With 1139 Gerardo Dodson MD    vancomycin 125 mg Oral Q6H Neil Trejo MD         Today, Patient Was Seen By: RIKA Agudelo    ** Please Note: Dictation voice to text software may have been used in the creation of this document   **

## 2020-03-06 NOTE — PROGRESS NOTES
Progress Note - Nancie Armstrong 1940, 79 y o  male MRN: [id number]    Unit/Bed#: -01 Encounter: [id number]    Primary Care Provider: Ramon Jones,    Date and time admitted to hospital: 3/3/2020  2:38 PM        * Diarrhea of presumed infectious origin  Assessment & Plan  · Recurrence with history of C diff infection  · Check stool studies  - positive C diff  · Prophylactic vancomycin p o   · is improving    Type 2 diabetes 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, with long-term current use of insulin [facility])  Assessment & Plan  Lab Results   Component Value Date    HGBA1C 8 1 (H) 06/14/2019       Recent Labs     03/05/20  1624 03/06/20  0616 03/06/20  0722 03/06/20  1109   POCGLU 193* 47* 118 212*       Blood Sugar Average: Last 72 hrs:  (P) 988 [id number]   · Continue Lantus and insulin coverage as needed    Weakness generalized  Assessment & Plan  · With hypotension earlier at office visit has resolved continue IV hydration  · PT OT eval is for discharge planning    Essential hypertension with hypotension earlier  Assessment & Plan  · Patient with hypotension earlier likely secondary to volume depletion-improved with hydration  · Hold parameters on medication  · Patient is back to baseline    Mixed hyperlipidemia  Assessment & Plan  · Patient has been taken off his home medications      Chronic indwelling Haile catheter  Assessment & Plan  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  · Patient is positive for E coli and ESBL  · Patient had IV Rocephin, currently discontinued  · Continue medications    Acute hypokalemia  Assessment & Plan  · Level was found to be 3 3  · Patient will be started on 40 mEq p o  B i d  potassium supplement  · Will monitor labs    Stage 4 chronic kidney disease (HCC)  Assessment & Plan  · Creatinine seems to be in the 2-2 4 range  · Monitor    Hypomagnesemia  Assessment & Plan  · Level was found to be 1 6  · Patient will be given to g IV bolus  · Monitor labs    Moderate protein-calorie malnutrition (HCC)  Assessment & Plan  Malnutrition Findings:   Malnutrition type: Chronic illness chronic illness with neuroendocrine tumor  Degree of Malnutrition: Malnutrition of moderate degree(related to increased energy/protein needs in setting of neuroendocine cancer as evidenced by moderate muscle wasting pectoralis, trapezius, temporalis and moderate fat loss triceps, rib cage and orbital pads treated with regular diet/ensure max supplemen)    BMI Findings:  BMI Classifications: (23 50)     Body mass index is 23 5 kg/m²  · Patient will be placed on regular diet with Ensure max supplementation    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 [facility])  Assessment & Plan  · Follows with Dr Justina Albright  · Patient on lanreotide injections  · Has been having increasing diarrhea during previous hospitalizations also      VTE Pharmacologic Prophylaxis: Pharmacologic: Heparin    Patient Centered Rounds: I have performed bedside rounds with nursing staff today  Discussions with Specialists or Other Care Team Provider:  Nursing, PT OT  Education and Discussions with Family / Patient:  Discussed with the patient and significant other at the bedside    Current Length of Stay: 3 day(s)    Current Patient Status: Inpatient   Certification Statement: The patient will continue to require additional inpatient hospital stay due to Continuation vancomycin p o  For C diff, patient started on regular diet    Discharge Plan:  Home verses rehab    Code Status: Level 1 - Full Code    Subjective:   Patient is lying in bed  He states he has no true complaints other than he has continued diarrhea, which he states is his normal now      Objective:     Vitals:   Temp (24hrs), Av 7 °F (36 5 °C), Min:97 5 °F (36 4 °C), Max:97 9 °F (36 6 °C)    Temp:  [97 5 °F (36 4 °C)-97 9 °F (36 6 °C)] 97 5 °F (36 4 °C)  HR:  Corbin Bond 74  Resp:  [16-21] 21  BP: (140-146)/(66-76) 140/68  SpO2:  [99 %] 99 %  Body mass index is 23 5 kg/m²  Input and Output Summary (last 24 hours): Intake/Output Summary (Last 24 hours) at 3/6/2020 1529  Last data filed at 3/6/2020 1300  Gross per 24 hour   Intake 530 ml   Output 2100 ml   Net -1570 ml       Physical Exam:     Physical Exam   Constitutional: He is oriented to person, place, and time  He appears cachectic  He is cooperative  He appears ill  Frail   HENT:   Head: Normocephalic and atraumatic  Nose: Nose normal    Mouth/Throat: Oropharynx is clear and moist  Mucous membranes are dry  Eyes: Conjunctivae and EOM are normal    Neck: Full passive range of motion without pain  Cardiovascular: Normal rate, regular rhythm, normal heart sounds and normal pulses  Pulmonary/Chest: Effort normal  He has decreased breath sounds in the right lower field and the left lower field  Abdominal: Soft  Normal appearance and bowel sounds are normal    Patient with continued diarrhea   Genitourinary:   Genitourinary Comments: Patient with chronic Haile catheter, yellow urine   Musculoskeletal:   Generalized weakness   Neurological: He is alert and oriented to person, place, and time  Psychiatric: He has a normal mood and affect   His speech is normal and behavior is normal        Additional Data:     Labs:    Results from last 7 days   Lab Units 03/05/20  0441   WBC Thousand/uL 6 80   HEMOGLOBIN g/dL 10 7*   HEMATOCRIT % 31 8*   PLATELETS Thousands/uL 221   NEUTROS PCT % 75   LYMPHS PCT % 15*   MONOS PCT % 7   EOS PCT % 2     Results from last 7 days   Lab Units 03/05/20  0441 03/04/20  0508   POTASSIUM mmol/L 4 4 3 3*   CHLORIDE mmol/L 110* 111*   CO2 mmol/L 22 21   BUN mg/dL 22 27*   CREATININE mg/dL 2 16* 2 19*   CALCIUM mg/dL 8 4* 8 4*   ALK PHOS U/L  --  112   ALT U/L  --  16   AST U/L  --  22     Results from last 7 days   Lab Units 03/03/20  1608   INR  1 03     Results from last 7 days   Lab Units 03/06/20  1109 03/06/20  0722 03/06/20  0616 03/05/20  1624 03/05/20  1154 03/05/20  3081 03/04/20  2019 03/04/20  1638 03/04/20  1127 03/04/20  0808 03/04/20  0653 03/03/20  2036   POC GLUCOSE mg/dl 212* 118 47* 193* 231* 148* 147* 154* 192* 102 52* 169*           * I Have Reviewed All Lab Data Listed Above  * Additional Pertinent Lab Tests Reviewed: Laura Cruz Admission  Reviewed    Imaging:  Imaging Reports Reviewed Today Include:  None    Recent Cultures (last 7 days):     Results from last 7 days   Lab Units 03/03/20  2057 03/03/20  1632 03/03/20  1610 03/03/20  1608   BLOOD CULTURE   --   --  No Growth at 48 hrs  No Growth at 48 hrs     URINE CULTURE   --  >100,000 cfu/ml Escherichia coli ESBL*  --   --    C DIFF TOXIN B  Positive*  --   --   --        Last 24 Hours Medication List:     Current Facility-Administered Medications:  acetaminophen 650 mg Oral Q6H PRN Xavi Ivey MD    amLODIPine 10 mg Oral Daily Xavi Ivey MD    aspirin 81 mg Oral Daily Xavi Ivey MD    b complex-vitamin C-folic acid 1 capsule Oral Daily With 5272 Gerardo Dodson MD    cholecalciferol 1,000 Units Oral Daily Xavi Ivey MD    colchicine 0 3 mg Oral Daily Xavi Ivey MD    heparin (porcine) 5,000 Units Subcutaneous [facility] Xavi Ivey MD    insulin detemir 10 Units Subcutaneous HS Xavi Ivey MD    [facility]avi [facility]    ondansetron 4 mg Intravenous Q6H PRN Xavi Ivey MD    pantoprazole 40 mg Oral Early Morning Xavi Ivey MD    potassium chloride 40 mEq Oral BID RIKA Harrison    sodium chloride 75 mL/hr Intravenous Continuous Xavi Ivey MD Last Rate: 75 mL/hr (03/06/20 0606)   tamsulosin 0 4 mg Oral Daily With 1139 Gerardo Dodson MD    vancomycin 125 mg Oral Q6H Dennard Nissen, MD         Today, Patient Was Seen By: RIKA Stewart    ** Please Note: Dictation voice to text software may have been used in the creation of this document   **

## 2020-03-06 NOTE — NURSING NOTE
Pt sleeping most of shift, no bowel movements tonight  No s/s of pain or distress  Precautions maintained  Call bell within reach

## 2020-03-06 NOTE — ASSESSMENT & PLAN NOTE
· Follows with Dr Nerissa Chand  · Patient on lanreotide injections  · Has been having increasing diarrhea during previous hospitalizations also

## 2020-03-07 PROBLEM — A04.72 CLOSTRIDIUM DIFFICILE DIARRHEA: Status: ACTIVE | Noted: 2020-03-03

## 2020-03-07 PROBLEM — Z22.39 ESBL ESCHERICHIA COLI CARRIER: Chronic | Status: ACTIVE | Noted: 2020-03-07

## 2020-03-07 PROBLEM — A04.72 CLOSTRIDIUM DIFFICILE DIARRHEA: Chronic | Status: ACTIVE | Noted: 2020-03-03

## 2020-03-07 PROBLEM — Z22.39 ESBL ESCHERICHIA COLI CARRIER: Status: ACTIVE | Noted: 2020-03-07

## 2020-03-07 PROBLEM — E87.6 ACUTE HYPOKALEMIA: Chronic | Status: ACTIVE | Noted: 2020-03-04

## 2020-03-07 PROBLEM — E83.42 HYPOMAGNESEMIA: Chronic | Status: ACTIVE | Noted: 2019-09-28

## 2020-03-07 LAB
ANION GAP SERPL CALCULATED.3IONS-SCNC: 6 MMOL/L (ref 4–13)
BACTERIA UR CULT: ABNORMAL
BACTERIA UR CULT: ABNORMAL
BASOPHILS # BLD AUTO: 0.1 THOUSANDS/ΜL (ref 0–0.1)
BASOPHILS NFR BLD AUTO: 1 % (ref 0–2)
BUN SERPL-MCNC: 20 MG/DL (ref 7–25)
CALCIUM SERPL-MCNC: 8.3 MG/DL (ref 8.6–10.5)
CHLORIDE SERPL-SCNC: 113 MMOL/L (ref 98–107)
CO2 SERPL-SCNC: 20 MMOL/L (ref 21–31)
CREAT SERPL-MCNC: 1.95 MG/DL (ref 0.7–1.3)
EOSINOPHIL # BLD AUTO: 0.2 THOUSAND/ΜL (ref 0–0.61)
EOSINOPHIL NFR BLD AUTO: 3 % (ref 0–5)
ERYTHROCYTE [DISTWIDTH] IN BLOOD BY AUTOMATED COUNT: 15.6 % (ref 11.5–14.5)
GFR SERPL CREATININE-BSD FRML MDRD: 31 ML/MIN/1.73SQ M
GLUCOSE SERPL-MCNC: 164 MG/DL (ref 65–140)
GLUCOSE SERPL-MCNC: 170 MG/DL (ref 65–140)
GLUCOSE SERPL-MCNC: 243 MG/DL (ref 65–140)
GLUCOSE SERPL-MCNC: 65 MG/DL (ref 65–140)
GLUCOSE SERPL-MCNC: 80 MG/DL (ref 65–99)
HCT VFR BLD AUTO: 29.8 % (ref 42–47)
HGB BLD-MCNC: 9.9 G/DL (ref 14–18)
LYMPHOCYTES # BLD AUTO: 1.4 THOUSANDS/ΜL (ref 0.6–4.47)
LYMPHOCYTES NFR BLD AUTO: 19 % (ref 21–51)
MAGNESIUM SERPL-MCNC: 1.6 MG/DL (ref 1.9–2.7)
MCH RBC QN AUTO: 30.5 PG (ref 26–34)
MCHC RBC AUTO-ENTMCNC: 33.2 G/DL (ref 31–37)
MCV RBC AUTO: 92 FL (ref 81–99)
MONOCYTES # BLD AUTO: 0.6 THOUSAND/ΜL (ref 0.17–1.22)
MONOCYTES NFR BLD AUTO: 8 % (ref 2–12)
NEUTROPHILS # BLD AUTO: 5.1 THOUSANDS/ΜL (ref 1.4–6.5)
NEUTS SEG NFR BLD AUTO: 69 % (ref 42–75)
PLATELET # BLD AUTO: 192 THOUSANDS/UL (ref 149–390)
PMV BLD AUTO: 8.4 FL (ref 8.6–11.7)
POTASSIUM SERPL-SCNC: 5 MMOL/L (ref 3.5–5.5)
RBC # BLD AUTO: 3.24 MILLION/UL (ref 4.3–5.9)
SODIUM SERPL-SCNC: 139 MMOL/L (ref 134–143)
WBC # BLD AUTO: 7.3 THOUSAND/UL (ref 4.8–10.8)

## 2020-03-07 PROCEDURE — 99231 SBSQ HOSP IP/OBS SF/LOW 25: CPT | Performed by: NURSE PRACTITIONER

## 2020-03-07 PROCEDURE — 82948 REAGENT STRIP/BLOOD GLUCOSE: CPT

## 2020-03-07 PROCEDURE — 80048 BASIC METABOLIC PNL TOTAL CA: CPT | Performed by: NURSE PRACTITIONER

## 2020-03-07 PROCEDURE — 85025 COMPLETE CBC W/AUTO DIFF WBC: CPT | Performed by: NURSE PRACTITIONER

## 2020-03-07 PROCEDURE — 83735 ASSAY OF MAGNESIUM: CPT | Performed by: NURSE PRACTITIONER

## 2020-03-07 RX ORDER — LANOLIN ALCOHOL/MO/W.PET/CERES
400 CREAM (GRAM) TOPICAL 2 TIMES DAILY
Qty: 60 TABLET | Refills: 0 | Status: CANCELLED | OUTPATIENT
Start: 2020-03-07 | End: 2020-04-06

## 2020-03-07 RX ADMIN — AMLODIPINE BESYLATE 10 MG: 5 TABLET ORAL at 09:02

## 2020-03-07 RX ADMIN — ACETAMINOPHEN 650 MG: 325 TABLET ORAL at 22:16

## 2020-03-07 RX ADMIN — INSULIN LISPRO 1 UNITS: 100 INJECTION, SOLUTION INTRAVENOUS; SUBCUTANEOUS at 16:24

## 2020-03-07 RX ADMIN — MAGNESIUM GLUCONATE 500 MG ORAL TABLET 400 MG: 500 TABLET ORAL at 17:49

## 2020-03-07 RX ADMIN — PANTOPRAZOLE SODIUM 40 MG: 40 TABLET, DELAYED RELEASE ORAL at 05:19

## 2020-03-07 RX ADMIN — Medication 125 MG: at 22:14

## 2020-03-07 RX ADMIN — ASPIRIN 81 MG 81 MG: 81 TABLET ORAL at 09:03

## 2020-03-07 RX ADMIN — VITAMIN D, TAB 1000IU (100/BT) 1000 UNITS: 25 TAB at 09:03

## 2020-03-07 RX ADMIN — HEPARIN SODIUM 5000 UNITS: 5000 INJECTION, SOLUTION INTRAVENOUS; SUBCUTANEOUS at 05:19

## 2020-03-07 RX ADMIN — Medication 1 CAPSULE: at 16:24

## 2020-03-07 RX ADMIN — Medication 125 MG: at 05:00

## 2020-03-07 RX ADMIN — Medication 125 MG: at 16:24

## 2020-03-07 RX ADMIN — Medication 125 MG: at 09:01

## 2020-03-07 RX ADMIN — MAGNESIUM GLUCONATE 500 MG ORAL TABLET 400 MG: 500 TABLET ORAL at 12:02

## 2020-03-07 RX ADMIN — HEPARIN SODIUM 5000 UNITS: 5000 INJECTION, SOLUTION INTRAVENOUS; SUBCUTANEOUS at 22:14

## 2020-03-07 RX ADMIN — SODIUM CHLORIDE 75 ML/HR: 0.9 INJECTION, SOLUTION INTRAVENOUS at 09:06

## 2020-03-07 RX ADMIN — INSULIN LISPRO 1 UNITS: 100 INJECTION, SOLUTION INTRAVENOUS; SUBCUTANEOUS at 11:11

## 2020-03-07 RX ADMIN — INSULIN DETEMIR 10 UNITS: 100 INJECTION, SOLUTION SUBCUTANEOUS at 22:14

## 2020-03-07 RX ADMIN — POTASSIUM CHLORIDE 40 MEQ: 1500 TABLET, EXTENDED RELEASE ORAL at 09:02

## 2020-03-07 RX ADMIN — POTASSIUM CHLORIDE 40 MEQ: 1500 TABLET, EXTENDED RELEASE ORAL at 17:49

## 2020-03-07 RX ADMIN — HEPARIN SODIUM 5000 UNITS: 5000 INJECTION, SOLUTION INTRAVENOUS; SUBCUTANEOUS at 14:17

## 2020-03-07 RX ADMIN — COLCHICINE 0.3 MG: 0.6 TABLET, FILM COATED ORAL at 09:03

## 2020-03-07 RX ADMIN — TAMSULOSIN HYDROCHLORIDE 0.4 MG: 0.4 CAPSULE ORAL at 16:24

## 2020-03-07 NOTE — INCIDENTAL FINDINGS
The following findings require follow up:  None      Finding:     POSITIVE C  DIFF INFECTION    CONTINUE WITH VANCOMYCIN ORAL  Follow up required:       Follow up should be done within 1 week(s)    Please notify the following clinician to assist with the follow up:   Dr Tamsen Saint,  [phone]

## 2020-03-07 NOTE — SOCIAL WORK
Spoke to GERARDO MELÉNDEZ who indicated the pt will be stable to be discharged tomorrow  Spoke to the SO at the bedside who will transport  Will fax the AVS on Monday to Jamie Ochoa

## 2020-03-07 NOTE — ASSESSMENT & PLAN NOTE
Lab Results   Component Value Date    HGBA1C 8 1 (H) 06/14/2019       Recent Labs     03/06/20  1628 03/06/20  2115 03/07/20  0556 03/07/20  1100   POCGLU 207* 196* 65 170*       Blood Sugar Average: Last 72 hrs:  (P) 552 [id number]   · Continue Lantus and insulin coverage as needed

## 2020-03-07 NOTE — ASSESSMENT & PLAN NOTE
· Level was found to be 3 3 on admission  · Patient will be started on 40 mEq p o  B i d  potassium supplement  · Will decrease to 20mEq po BID since diarrhea is slightly improving  · Will monitor labs

## 2020-03-07 NOTE — ASSESSMENT & PLAN NOTE
· Follows with Dr Naina Oliver  · Patient on lanreotide injections  · Has been having increasing diarrhea during previous hospitalizations also

## 2020-03-07 NOTE — ASSESSMENT & PLAN NOTE
· Level was found to be 1 6  · Patient will be given to g IV bolus  · Start oral replacement   · Monitor labs

## 2020-03-07 NOTE — PROGRESS NOTES
Progress Note - Jay Jay Queen 1940, 79 y o  male MRN: [id number]    Unit/Bed#: -01 Encounter: [id number]    Primary Care Provider: Nery Sommer DO   Date and time admitted to hospital: 3/3/2020  2:38 PM        * Clostridium difficile diarrhea  Assessment & Plan  · Recurrence with history of C diff infection - positive on 3/3/2020 and 2/11/2020  · Check stool studies  - positive C diff  · Prophylactic vancomycin p o  Day 5  · is improving    Type 2 diabetes 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, with long-term current use of insulin [facility])  Assessment & Plan  Lab Results   Component Value Date    HGBA1C 8 1 (H) 06/14/2019       Recent Labs     03/06/20  1628 03/06/20  2115 03/07/20  0556 03/07/20  1100   POCGLU 207* 196* 65 170*       Blood Sugar Average: Last 72 hrs:  (P) 295 [id number]   · Continue Lantus and insulin coverage as needed    Weakness generalized  Assessment & Plan  · With hypotension earlier at office visit has resolved continue IV hydration  · PT OT eval is for discharge planning    Essential hypertension with hypotension earlier  Assessment & Plan  · Patient with hypotension earlier likely secondary to volume depletion-improved with hydration  · Hold parameters on medication  · Patient is back to baseline    Mixed hyperlipidemia  Assessment & Plan  · Patient has been taken off his home medications      Chronic indwelling Haile catheter  Assessment & Plan  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  · Patient is positive for E coli and ESBL  · Patient had IV Rocephin, currently discontinued  · Continue medications    Acute hypokalemia  Assessment & Plan  · Level was found to be 3 3 on admission  · Patient will be started on 40 mEq p o  B i d  potassium supplement  · Will decrease to 20mEq po BID since diarrhea is slightly improving  · Will monitor labs    Stage 4 chronic kidney disease ([facility] [address] )  Assessment & Plan  · Creatinine seems to be in the 2-2 4 range  · Monitor    Hypomagnesemia  Assessment & Plan  · Level was found to be 1 6  · Patient will be given to g IV bolus  · Start oral replacement   · Monitor labs    Moderate protein-calorie malnutrition (HCC)  Assessment & Plan  Malnutrition Findings:   Malnutrition type: Chronic illness chronic illness with neuroendocrine tumor  Degree of Malnutrition: Malnutrition of moderate degree(related to increased energy/protein needs in setting of neuroendocine cancer as evidenced by moderate muscle wasting pectoralis, trapezius, temporalis and moderate fat loss triceps, rib cage and orbital pads treated with regular diet/ensure max supplemen)    BMI Findings:  BMI Classifications: (23 50)     Body mass index is 23 2 kg/m²  · Patient will be placed on regular diet with Ensure max supplementation    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 [facility])  Assessment & Plan  · Follows with Dr Nathaniel Gallardo  · Patient on lanreotide injections  · Has been having increasing diarrhea during previous hospitalizations also    VTE Pharmacologic Prophylaxis: Pharmacologic: Heparin    Patient Centered Rounds: I have performed bedside rounds with nursing staff today  Discussions with Specialists or Other Care Team Provider:  Nursing, PT OT, case management  Education and Discussions with Family / Patient:  Discussed with the patient and his significant other at the bedside    Current Length of Stay: 4 day(s)    Current Patient Status: Inpatient   Certification Statement: The patient will continue to require additional inpatient hospital stay due to Further management of patient's laboratory findings, and continued oral vancomycin  Discharge Plan:  Discharge Plan for 03/08/2020 to home with home health care/[facility]  Code Status: Level 1 - Full Code    Subjective:   Patient is lying in bed  His significant other and fender at the bedside    He offers no complaints, and states that he actually feels better than he did  He states he still has diarrhea however it is slowing down  Objective:     Vitals:   Temp (24hrs), Av 5 °F (36 4 °C), Min:97 2 °F (36 2 °C), Max:97 7 °F (36 5 °C)    Temp:  [97 2 °F (36 2 °C)-97 7 °F (36 5 °C)] 97 2 °F (36 2 °C)  HR:  [73-80] 80  Resp:  [16-21] 16  BP: (140-160)/(68-85) 160/85  SpO2:  [97 %-99 %] 99 %  Body mass index is 23 2 kg/m²  Input and Output Summary (last 24 hours): Intake/Output Summary (Last 24 hours) at 3/7/2020 1151  Last data filed at 3/7/2020 1884  Gross per 24 hour   Intake 1440 ml   Output 3075 ml   Net -1635 ml       Physical Exam:     Physical Exam   Constitutional: He is oriented to person, place, and time  Vital signs are normal  He appears cachectic  He is cooperative  Frail   HENT:   Head: Normocephalic and atraumatic  Nose: Nose normal    Mouth/Throat: Oropharynx is clear and moist and mucous membranes are normal    Eyes: Conjunctivae and EOM are normal    Neck: Full passive range of motion without pain  Cardiovascular: Normal rate, regular rhythm, normal heart sounds and normal pulses  Pulmonary/Chest: Effort normal and breath sounds normal    Abdominal: Soft  Normal appearance and bowel sounds are normal    Patient continues with diarrhea   Genitourinary:   Genitourinary Comments: Chronic indwelling Haile due to retention, yellow urine  Musculoskeletal:   Chronic generalized weakness and debility  Neurological: He is alert and oriented to person, place, and time  Skin: Skin is warm  Psychiatric: He has a normal mood and affect   His speech is normal and behavior is normal        Additional Data:     Labs:    Results from last 7 days   Lab Units 20  0519   WBC Thousand/uL 7 30   HEMOGLOBIN g/dL 9 9*   HEMATOCRIT % 29 8*   PLATELETS Thousands/uL 192   NEUTROS PCT % 69   LYMPHS PCT % 19*   MONOS PCT % 8   EOS PCT % 3     Results from last 7 days   Lab Units 20 03/04/20  0508   POTASSIUM mmol/L 5 0   < > 3 3*   CHLORIDE mmol/L 113*   < > 111*   CO2 mmol/L 20*   < > 21   BUN mg/dL 20   < > 27*   CREATININE mg/dL 1 95*   < > 2 19*   CALCIUM mg/dL 8 3*   < > 8 4*   ALK PHOS U/L  --   --  112   ALT U/L  --   --  16   AST U/L  --   --  22    < > = values in this interval not displayed  Results from last 7 days   Lab Units 03/03/20  1608   INR  1 03     Results from last 7 days   Lab Units 03/07/20  1100 03/07/20  0556 03/06/20  2115 03/06/20  1628 03/06/20  1109 03/06/20  0722 03/06/20  0616 03/05/20  1624 03/05/20  1154 03/05/20  0613 03/04/20  2019 03/04/20  1638   POC GLUCOSE mg/dl 170* 65 196* 207* 212* 118 47* 193* 231* 148* 147* 154*           * I Have Reviewed All Lab Data Listed Above  * Additional Pertinent Lab Tests Reviewed: Laura 66 Admission  Reviewed    Imaging:  Imaging Reports Reviewed Today Include:  None    Recent Cultures (last 7 days):     Results from last 7 days   Lab Units 03/03/20  2057 03/03/20  1632 03/03/20  1610 03/03/20  1608   BLOOD CULTURE   --   --  No Growth at 72 hrs  No Growth at 72 hrs     URINE CULTURE   --  >100,000 cfu/ml Escherichia coli ESBL*  10,000-19,000 cfu/ml   --   --    C DIFF TOXIN B  Positive*  --   --   --        Last 24 Hours Medication List:     Current Facility-Administered Medications:  acetaminophen 650 mg Oral Q6H PRN Ted Fishman MD    amLODIPine 10 mg Oral Daily Ted Fishman MD    aspirin 81 mg Oral Daily Ted Fishman MD    b complex-vitamin C-folic acid 1 capsule Oral Daily With [facility] [facility] Carol Ann Dodson MD    cholecalciferol 1,000 Units Oral Daily Ted Fishman MD    colchicine 0 3 mg Oral Daily Ted Fishman MD    heparin (porcine) 5,000 Units Subcutaneous [facility] Ted Fishman MD    insulin detemir 10 Units Subcutaneous HS Ted Fishman MD    insulin lispro 1-5 Units Subcutaneous TID [facility] Ted Fishman MD    magnesium Oxide 400 mg Oral BID RIKA Harrison    ondansetron 4 mg Intravenous Q6H PRN Maximino Burns MD    pantoprazole 40 mg Oral Early Morning Maximino Burns MD    potassium chloride 40 mEq Oral BID RIKA Harrison    sodium chloride 75 mL/hr Intravenous Continuous Maximino Burns MD Last Rate: 75 mL/hr (03/07/20 0906)   tamsulosin 0 4 mg Oral Daily With Esperanza Lares MD    vancomycin 125 mg Oral Q6H Jayant Chau MD         Today, Patient Was Seen By: RIKA Stewart    ** Please Note: Dictation voice to text software may have been used in the creation of this document   **

## 2020-03-07 NOTE — ASSESSMENT & PLAN NOTE
Malnutrition Findings:   Malnutrition type: Chronic illness chronic illness with neuroendocrine tumor  Degree of Malnutrition: Malnutrition of moderate degree(related to increased energy/protein needs in setting of neuroendocine cancer as evidenced by moderate muscle wasting pectoralis, trapezius, temporalis and moderate fat loss triceps, rib cage and orbital pads treated with regular diet/ensure max supplemen)    BMI Findings:  BMI Classifications: (23 50)     Body mass index is 23 2 kg/m²       · Patient will be placed on regular diet with Ensure max supplementation

## 2020-03-07 NOTE — PLAN OF CARE
Problem: Potential for Falls  Goal: Patient will remain free of falls  Description  INTERVENTIONS:  - Assess patient frequently for physical needs  -  Identify cognitive and physical deficits and behaviors that affect risk of falls    -  [place] fall precautions as indicated by assessment   - Educate patient/family on patient safety including physical limitations  - Instruct patient to call for assistance with activity based on assessment  - Modify environment to reduce risk of injury  - Consider OT/PT consult to assist with strengthening/mobility  Outcome: Progressing     Problem: PAIN - ADULT  Goal: Verbalizes/displays adequate comfort level or baseline comfort level  Description  Interventions:  - Encourage patient to monitor pain and request assistance  - Assess pain using appropriate pain scale  - Administer analgesics based on type and severity of pain and evaluate response  - Implement non-pharmacological measures as appropriate and evaluate response  - Notify physician/advanced practitioner if interventions unsuccessful or patient reports new pain  Outcome: Progressing     Problem: INFECTION - ADULT  Goal: Absence or prevention of progression during hospitalization  Description  INTERVENTIONS:  - Assess and monitor for signs and symptoms of infection  - Monitor lab/diagnostic results  - Monitor all insertion sites, i e  indwelling lines, tubes, and drains  - Monitor endotracheal if appropriate and nasal secretions for changes in amount and color  - [place] appropriate cooling/warming therapies per order  - Administer medications as ordered  - Instruct and encourage patient and family to use good hand hygiene technique  - Identify and instruct in appropriate isolation precautions for identified infection/condition  Outcome: Progressing  Goal: Absence of fever/infection during neutropenic period  Description  INTERVENTIONS:  - Monitor WBC    Outcome: Progressing     Problem: SAFETY ADULT  Goal: Patient will remain free of falls  Description  INTERVENTIONS:  - Assess patient frequently for physical needs  -  Identify cognitive and physical deficits and behaviors that affect risk of falls    -  [place] fall precautions as indicated by assessment   - Educate patient/family on patient safety including physical limitations  - Instruct patient to call for assistance with activity based on assessment  - Modify environment to reduce risk of injury  - Consider OT/PT consult to assist with strengthening/mobility  Outcome: Progressing  Goal: Maintain or return to baseline ADL function  Description  INTERVENTIONS:  -  Assess patient's ability to carry out ADLs; assess patient's baseline for ADL function and identify physical deficits which impact ability to perform ADLs (bathing, care of mouth/teeth, toileting, grooming, dressing, etc )  - Assess/evaluate cause of self-care deficits   - Assess range of motion  - Assess patient's mobility; develop plan if impaired  - Assess patient's need for assistive devices and provide as appropriate  - Encourage maximum independence but intervene and supervise when necessary  - Involve family in performance of ADLs  - Assess for home care needs following discharge   - Consider OT consult to assist with ADL evaluation and planning for discharge  - Provide patient education as appropriate  Outcome: Progressing  Goal: Maintain or return mobility status to optimal level  Description  INTERVENTIONS:  - Assess patient's baseline mobility status (ambulation, transfers, stairs, etc )    - Identify cognitive and physical deficits and behaviors that affect mobility  - Identify mobility aids required to assist with transfers and/or ambulation (gait belt, sit-to-stand, lift, walker, cane, etc )  - [place] fall precautions as indicated by assessment  - Record patient progress and toleration of activity level on Mobility SBAR; progress patient to next Phase/Stage  - Instruct patient to call for assistance with activity based on assessment  - Consider rehabilitation consult to assist with strengthening/weightbearing, etc   Outcome: Progressing     Problem: DISCHARGE PLANNING  Goal: Discharge to home or other facility with appropriate resources  Description  INTERVENTIONS:  - Identify barriers to discharge w/patient and caregiver  - Arrange for needed discharge resources and transportation as appropriate  - Identify discharge learning needs (meds, wound care, etc )  - Arrange for interpretive services to assist at discharge as needed  - Refer to Case Management Department for coordinating discharge planning if the patient needs post-hospital services based on physician/advanced practitioner order or complex needs related to functional status, cognitive ability, or social support system  Outcome: Progressing     Problem: Knowledge Deficit  Goal: Patient/family/caregiver demonstrates understanding of disease process, treatment plan, medications, and discharge instructions  Description  Complete learning assessment and assess knowledge base  Interventions:  - Provide teaching at level of understanding  - Provide teaching via preferred learning methods  Outcome: Progressing     Problem: Nutrition/Hydration-ADULT  Goal: Nutrient/Hydration intake appropriate for improving, restoring or maintaining nutritional needs  Description  Monitor and assess patient's nutrition/hydration status for malnutrition  Collaborate with interdisciplinary team and initiate plan and interventions as ordered  Monitor patient's weight and dietary intake as ordered or per policy  Utilize nutrition screening tool and intervene as necessary  Determine patient's food preferences and provide high-protein, high-caloric foods as appropriate       INTERVENTIONS:  - Monitor oral intake, urinary output, labs, and treatment plans  - Assess nutrition and hydration status and recommend course of action  - Evaluate amount of meals eaten  - Assist patient with eating if necessary   - Allow adequate time for meals  - Recommend/ encourage appropriate diets, oral nutritional supplements, and vitamin/mineral supplements  - Order, calculate, and assess calorie counts as needed  - Recommend, monitor, and adjust tube feedings and TPN/PPN based on assessed needs  - Assess need for intravenous fluids  - Provide specific nutrition/hydration education as appropriate  - Include patient/family/caregiver in decisions related to nutrition  Outcome: Progressing     Problem: DISCHARGE PLANNING - CARE MANAGEMENT  Goal: Discharge to post-acute care or home with appropriate resources  Description  INTERVENTIONS:  - Conduct assessment to determine patient/family and health care team treatment goals, and need for post-acute services based on payer coverage, community resources, and patient preferences, and barriers to discharge  - Address psychosocial, clinical, and financial barriers to discharge as identified in assessment in conjunction with the patient/family and health care team  - Arrange appropriate level of post-acute services according to patient's   needs and preference and payer coverage in collaboration with the physician and health care team  - Communicate with and update the patient/family, physician, and health care team regarding progress on the discharge plan  - Arrange appropriate transportation to post-acute venues    Pt's goal is to return home with  and [facility]   Outcome: Progressing     Problem: Prexisting or High Potential for Compromised Skin Integrity  Goal: Skin integrity is maintained or improved  Description  INTERVENTIONS:  - Identify patients at risk for skin breakdown  - Assess and monitor skin integrity  - Assess and monitor nutrition and hydration status  - Monitor labs   - Assess for incontinence   - Turn and reposition patient  - Assist with mobility/ambulation  - Relieve pressure over bony prominences  - Avoid friction and shearing  - Provide appropriate hygiene as needed including keeping skin clean and dry  - Evaluate need for skin moisturizer/barrier cream  - Collaborate with interdisciplinary team   - Patient/family teaching  - Consider wound care consult   Outcome: Progressing

## 2020-03-07 NOTE — ASSESSMENT & PLAN NOTE
· Recurrence with history of C diff infection - positive on 3/3/2020 and 2/11/2020  · Check stool studies  - positive C diff  · Prophylactic vancomycin p o   Day 5  · is improving

## 2020-03-08 VITALS
OXYGEN SATURATION: 99 % | SYSTOLIC BLOOD PRESSURE: 155 MMHG | BODY MASS INDEX: 22.85 KG/M2 | WEIGHT: 150.79 LBS | TEMPERATURE: 97.4 F | HEIGHT: 68 IN | RESPIRATION RATE: 18 BRPM | DIASTOLIC BLOOD PRESSURE: 76 MMHG | HEART RATE: 74 BPM

## 2020-03-08 LAB
GLUCOSE SERPL-MCNC: 126 MG/DL (ref 65–140)
GLUCOSE SERPL-MCNC: 197 MG/DL (ref 65–140)
MAGNESIUM SERPL-MCNC: 1.6 MG/DL (ref 1.9–2.7)

## 2020-03-08 PROCEDURE — 83735 ASSAY OF MAGNESIUM: CPT | Performed by: NURSE PRACTITIONER

## 2020-03-08 PROCEDURE — 99239 HOSP IP/OBS DSCHRG MGMT >30: CPT | Performed by: NURSE PRACTITIONER

## 2020-03-08 PROCEDURE — 82948 REAGENT STRIP/BLOOD GLUCOSE: CPT

## 2020-03-08 RX ORDER — POTASSIUM CHLORIDE 20 MEQ/1
20 TABLET, EXTENDED RELEASE ORAL DAILY
Qty: 30 TABLET | Refills: 0 | Status: SHIPPED | OUTPATIENT
Start: 2020-03-08 | End: 2020-03-23

## 2020-03-08 RX ORDER — MAGNESIUM SULFATE HEPTAHYDRATE 40 MG/ML
2 INJECTION, SOLUTION INTRAVENOUS ONCE
Status: COMPLETED | OUTPATIENT
Start: 2020-03-08 | End: 2020-03-08

## 2020-03-08 RX ADMIN — AMLODIPINE BESYLATE 10 MG: 5 TABLET ORAL at 09:55

## 2020-03-08 RX ADMIN — COLCHICINE 0.3 MG: 0.6 TABLET, FILM COATED ORAL at 09:56

## 2020-03-08 RX ADMIN — VITAMIN D, TAB 1000IU (100/BT) 1000 UNITS: 25 TAB at 09:55

## 2020-03-08 RX ADMIN — HEPARIN SODIUM 5000 UNITS: 5000 INJECTION, SOLUTION INTRAVENOUS; SUBCUTANEOUS at 05:23

## 2020-03-08 RX ADMIN — MAGNESIUM GLUCONATE 500 MG ORAL TABLET 400 MG: 500 TABLET ORAL at 11:42

## 2020-03-08 RX ADMIN — POTASSIUM CHLORIDE 40 MEQ: 1500 TABLET, EXTENDED RELEASE ORAL at 09:55

## 2020-03-08 RX ADMIN — Medication 125 MG: at 03:30

## 2020-03-08 RX ADMIN — ACETAMINOPHEN 650 MG: 325 TABLET ORAL at 05:33

## 2020-03-08 RX ADMIN — ASPIRIN 81 MG 81 MG: 81 TABLET ORAL at 09:55

## 2020-03-08 RX ADMIN — INSULIN LISPRO 1 UNITS: 100 INJECTION, SOLUTION INTRAVENOUS; SUBCUTANEOUS at 11:42

## 2020-03-08 RX ADMIN — MAGNESIUM SULFATE IN WATER 2 G: 40 INJECTION, SOLUTION INTRAVENOUS at 09:55

## 2020-03-08 RX ADMIN — Medication 125 MG: at 09:55

## 2020-03-08 RX ADMIN — PANTOPRAZOLE SODIUM 40 MG: 40 TABLET, DELAYED RELEASE ORAL at 05:23

## 2020-03-08 NOTE — ASSESSMENT & PLAN NOTE
· 1st Recurrence with history of C diff infection - positive on 3/3/2020 and 2/11/2020  · Check stool studies  - positive C diff  · Prophylactic vancomycin p o  Day 6  · Patient will continue with oral vancomycin for longer duration:    · 125 mg p o  Every 6 hours for a total of 14 days - will need 8 more days of this  · Then, 125 p o  Q 12 for 7 days  · Then, 125 p o  Q day for 7 days  · Then, 125 p o   Every other day x4 weeks

## 2020-03-08 NOTE — DISCHARGE SUMMARY
Discharge- Kassidy Romero 1940, 79 y o  male MRN: [id number]    Unit/Bed#: -01 Encounter: [id number]    Primary Care Provider: Rose Hodges DO   Date and time admitted to hospital: 3/3/2020  2:38 PM        * Clostridium difficile diarrhea  Assessment & Plan  · 1st Recurrence with history of C diff infection - positive on 3/3/2020 and 2/11/2020  · Check stool studies  - positive C diff  · Prophylactic vancomycin p o  Day 6  · Patient will continue with oral vancomycin for longer duration:    · 125 mg p o  Every 6 hours for a total of 14 days - will need 8 more days of this  · Then, 125 p o  Q 12 for 7 days  · Then, 125 p o  Q day for 7 days  · Then, 125 p o  Every other day x4 weeks    Type 2 diabetes 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, with long-term current use of insulin [facility])  Assessment & Plan  Lab Results   Component Value Date    HGBA1C 8 1 (H) 06/14/2019       Recent Labs     03/07/20  1611 03/07/20 2017 03/08/20  0538 03/08/20  1111   POCGLU 164* 243* 126 197*       Blood Sugar Average: Last 72 hrs:  (P) 165 5   · Continue Lantus and insulin coverage as needed    Weakness generalized  Assessment & Plan  · With hypotension earlier at office visit has resolved continue IV hydration  · PT OT eval is for discharge planning  · Full continue PT with home care [facility]    Essential hypertension with hypotension earlier  Assessment & Plan  · Patient with hypotension earlier likely secondary to volume depletion-improved with hydration  · Hold parameters on medication  · Patient is back to baseline    Mixed hyperlipidemia  Assessment & Plan  · Patient has been taken off his home medications      Chronic indwelling Haile catheter  Assessment & Plan  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  · Patient is positive for E coli and ESBL  · Patient had IV Rocephin for 1 dose on 03/03/2020, and was discontinued  · After reviewing patient's past medical records for his urinalysis and urine cultures which were both positive for E coli and ESBL, this is not a new urinary tract infection in a patient with a chronic indwelling Haile  · Patient has had positive E coli and ESBL urine cultures obtained on the following dates:  · 01/27/2019, 02/03/2019, 04/29/2019, 06/03/2019, 06/17/2019, 06/24/2019, 06/25/2019, 06/28/2019, 07/22/2019, 09/15/2019, 09/27/2019, 11/30/2019, 01/10/2020, 02/08/2020, 03/03/2020  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  Assessment & Plan  · After reviewing patient's past medical records for his urinalysis and urine cultures which were both positive for E coli and ESBL, this is not a new urinary tract infection in a patient with a chronic indwelling Haile  · Patient has had positive E coli and ESBL urine cultures obtained on the following dates:  · 01/27/2019, 02/03/2019, 04/29/2019, 06/03/2019, 06/17/2019, 06/24/2019, 06/25/2019, 06/28/2019, 07/22/2019, 09/15/2019, 09/27/2019, 11/30/2019, 01/10/2020, 02/08/2020, 03/03/2020  · Patient will have home health care by [facility], day will continue to maintain Haile catheter      Acute hypokalemia  Assessment & Plan  · Level was found to be 3 3 on admission  · Patient will be started on 40 mEq p o  B i d  potassium supplement  · Will decrease to 20mEq po BID since diarrhea is slightly improving  · Has improved  · Will monitor labs    Stage 4 chronic kidney disease (HCC)  Assessment & Plan  · Creatinine seems to be in the 2-2 4 range  · Monitor    Hypomagnesemia  Assessment & Plan  · Level was found to be 1 6  · Patient will be given 2 g IV bolus  · Start oral replacement   · Monitor labs    Moderate protein-calorie malnutrition ([address] )  Assessment & Plan  Malnutrition Findings:   Malnutrition type: Chronic illness chronic illness with neuroendocrine tumor  Degree of Malnutrition: Malnutrition of moderate degree(related to increased energy/protein needs in setting of neuroendocine cancer as evidenced by moderate muscle wasting pectoralis, trapezius, temporalis and moderate fat loss triceps, rib cage and orbital pads treated with regular diet/ensure max supplemen)    BMI Findings:  BMI Classifications: (23 50)     Body mass index is 22 93 kg/m²  · Patient will be placed on regular diet with Ensure max supplementation    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 [facility])  Assessment & Plan  · Follows with Dr Yuliet Red  · Patient on lanreotide injections  · Has been having increasing diarrhea during previous hospitalizations also      Discharging Physician / Practitioner: Lilly Denny  PCP: Edith Schumacher DO  Admission Date:   Admission Orders (From admission, onward)     Ordered        03/03/20 1750  Inpatient Admission (expected length of stay for this patient Order details is greater than two midnights)  Once                   Discharge Date: 03/08/20    Resolved Problems  Date Reviewed: 3/8/2020    None          Consultations During Hospital Stay:  · PT OT  · Case management  · Nutrition     Procedures Performed:   3/3/2020: CHEST      INDICATION:   fatigue      COMPARISON:  12/10/2019     EXAM PERFORMED/VIEWS:  XR CHEST PA & LATERAL        FINDINGS:     Cardiomediastinal silhouette appears unremarkable      The lungs are clear  No pneumothorax or pleural effusion      Osseous structures appear within normal limits for patient age      IMPRESSION:     No acute cardiopulmonary disease      Significant Findings / Test Results:   · 3/3/2020:  Patient positive for C diff  · 3/3/2020: Urine positive for E Coli and ESBL    Incidental Findings:   · none     Test Results Pending at Discharge (will require follow up):   · none     Outpatient Tests Requested:  · none    Complications:     none    Reason for Admission: Weakness, low BP    Hospital Course:     Anton Vera is a 79 y o  male patient who originally presented to the hospital on 3/3/2020 due to weakness and low BP  Patient was seen at Hematology on 03/03/2020 and BP was 80/40 has been having diarrhea for several days and had 1 episode in the Hematology office  Feeling very weak, SOB  Has been having multiple bouts of diarrhea, has been taking imodium at times  Has been eating ok  Had episode of diarrhea in room, reported stools collected  Due for injection on 3/11/2020  Patient was found to be positive for C diff on 03/03/2020  He was started oral vancomycin  Since this is his first recurrence since his initial diagnosis of C Diff on 2/11/2020, patient will be discharged with longer dosing of Vancomycin oral      Patient also had a urinalysis with culture and sensitivity obtain from his chronic indwelling Haile catheter  This did come back positive for ESBL and E coli as he has numerous times in the past     Patient has had improved oral intake and slowing down of bowel movements  Patient does state he feels improved enough to be discharged home  He will continue to have home health care with [facility] as the provider  Please see above list of diagnoses and related plan for additional information  Condition at Discharge: fair     Discharge Day Visit / Exam:     Subjective:  Patient is lying in bed  He states he does feel better  And feels that he is able to be discharged home  Vitals: Blood Pressure: 155/76 (03/08/20 0752)  Pulse: 74 (03/08/20 0752)  Temperature: (!) 97 4 °F (36 3 °C) (03/08/20 0752)  Temp Source: Temporal (03/08/20 0752)  Respirations: 18 (03/08/20 0752)  Height: 5' 8" (172 7 cm) (03/03/20 1843)  Weight - Scale: 68 4 kg (150 lb 12 7 oz) (03/08/20 0600)  SpO2: 99 % (03/08/20 0752)  Exam:   Physical Exam   Constitutional: He is oriented to person, place, and time  Vital signs are normal  He appears cachectic  He is cooperative  HENT:   Head: Normocephalic and atraumatic     Nose: Nose normal    Mouth/Throat: Oropharynx is clear and moist and mucous membranes are normal    Eyes: Conjunctivae and EOM are normal    Neck: Full passive range of motion without pain  Cardiovascular: Normal rate, regular rhythm, normal heart sounds and normal pulses  Pulmonary/Chest: Effort normal and breath sounds normal    Abdominal: Soft  Normal appearance and bowel sounds are normal    Patient continues with loose bowels, but has decreased in amount  Genitourinary:   Genitourinary Comments: Patient with chronic indwelling garcia catheter with yellow urine  Musculoskeletal:   Generalized weakness   Neurological: He is alert and oriented to person, place, and time  Skin: Skin is warm  Psychiatric: He has a normal mood and affect  His speech is normal and behavior is normal        Discussion with Family:  Discussed with patient and significant other at the bedside    Discharge instructions/Information to patient and family:   See after visit summary for information provided to patient and family  Provisions for Follow-Up Care:  See after visit summary for information related to follow-up care and any pertinent home health orders  Disposition:     Home with VNA Services (Reminder: Complete face to face encounter)    For Discharges to [facility] SNF:   · Not Applicable to this Patient - Not Applicable to this Patient    Planned Readmission:    No     Discharge Statement:  I spent 40 minutes discharging the patient  This time was spent on the day of discharge  I had direct contact with the patient on the day of discharge  Greater than 50% of the total time was spent examining patient, answering all patient questions, arranging and discussing plan of care with patient as well as directly providing post-discharge instructions  Additional time then spent on discharge activities  Discharge Medications:  See after visit summary for reconciled discharge medications provided to patient and family        ** Please Note: This note has been constructed using a voice recognition system **

## 2020-03-08 NOTE — ASSESSMENT & PLAN NOTE
· After reviewing patient's past medical records for his urinalysis and urine cultures which were both positive for E coli and ESBL, this is not a new urinary tract infection in a patient with a chronic indwelling Haile  · Patient has had positive E coli and ESBL urine cultures obtained on the following dates:  · 01/27/2019, 02/03/2019, 04/29/2019, 06/03/2019, 06/17/2019, 06/24/2019, 06/25/2019, 06/28/2019, 07/22/2019, 09/15/2019, 09/27/2019, 11/30/2019, 01/10/2020, 02/08/2020, 03/03/2020  · Patient will have home health care by [facility], day will continue to maintain Haile catheter

## 2020-03-08 NOTE — DISCHARGE INSTRUCTIONS
Clostridium Difficile Infection   WHAT YOU NEED TO KNOW:   What is a Clostridium difficile infection? Clostridium difficile infection, or C  difficile, is an infection in your colon caused by bacteria  Different types of bacteria live inside the colon, creating a healthy balance between good and bad bacteria  If the C  difficile bacteria grow rapidly, this can disrupt the healthy balance of the colon  This can cause the lining of the colon to swell, which leads to an infection  How does a Clostridium difficile infection spread? The bowel movement of a person with a C  difficile infection contains C  difficile bacteria  Infected people who do not wash their hands after having a bowel movement can spread the infection  C  difficile bacteria may also be on surfaces, such as the tops of tables  The bacteria are often spread in a healthcare setting, such as a hospital    What increases my risk for a Clostridium difficile infection? · Antibiotics:  Antibiotic medicine kills both good and bad bacteria, which may upset the normal balance of bacteria in the colon  Your risk of getting C  difficile infection increases the longer you take antibiotic medicine  Your risk also increases if you take more than 1 type of antibiotic  · Hospital stay:  A long hospital stay, or sharing a room with a C  difficile-infected patient, increases your risk  · Age:  Older adults may get infections more easily than younger people because of body changes that occur with age  · Medical conditions:  A weak immune system caused by medicines, such as chemotherapy or steroids, can increase your risk for C  difficile  Major surgery, such as an organ transplant, can weaken your immune system  Your risk is increased if you have inflammatory bowel disease  · Dormant infection:  Inactive C  difficile bacteria may be in your body from a previous infection  This bacteria can cause a new infection      · Antacids:  Antacid medicine decreases stomach acid  Stomach acid normally works to kill harmful bacteria  This can upset the balance of bacteria in your colon and increase your risk of C  difficile infection  What are the signs and symptoms of a Clostridium difficile infection? Diarrhea is the most common symptom of C  difficile infection  You may have bad-smelling diarrhea many times a day  You may see blood, mucus, or pus in your bowel movements  You may also have any of the following:  · Fever or cramping pain in your abdomen    · Nausea or vomiting    · Dehydration (loss of too much body fluid)  How is a Clostridium difficile infection diagnosed? · Bowel movement tests:  A sample of your bowel movement is sent to a lab for testing  This test may show what kind of bacteria is causing your illness, and helps healthcare providers know what treatment is best for you  · Blood tests:  Blood tests can show signs of an infection  The blood may be taken from a blood vessel in your hand, arm, or the bend in your elbow  · Colonoscopy or sigmoidoscopy:  A scope is a long, bendable tube with a light on the end  The scope may also have a camera on it  During a colonoscopy or sigmoidoscopy, the scope is put into your anus and moved forward into your large colon  Healthcare providers look for problems, take pictures, and collect samples that are sent to the lab for tests  · CT scan: An x-ray machine uses a computer to take pictures of your colon  Before taking the pictures, you may be given dye through an IV  The dye helps your colon show up better in the pictures  People who are allergic to shellfish (lobster, crab, or shrimp) may be allergic to this dye  Tell your healthcare provider if you are allergic to any of these  How is a Clostridium difficile infection treated? The goal of treatment is to restore the healthy balance of bacteria to your colon  This should help stop your diarrhea   You may need the following:  · Antibiotics: Antibiotics help treat or prevent an infection caused by bacteria  If antibiotics caused your C  difficile infection, you may need to stop taking them and switch to a different antibiotic  Ask your healthcare provider for more information  · Oral rehydration therapy:  You will need to drink plenty of liquids to avoid dehydration  You may also drink an oral rehydration solution (ORS)  An ORS has the right amounts of water, salts, and sugar needed to replace body fluids  Ask your healthcare provider where to buy ORS and how much to drink  · Immune globulin medicine: You may need this to help your immune system if you have a severe C  difficile infection  · Surgery: If your C  difficile infection is severe or has damaged your colon, you may need surgery called colectomy  During surgery, part of your colon is removed  How can I manage my Clostridium difficile infection? · Wash your hands:  Wash your hands often with germ-killing soap and warm, running water  Alcohol-based hand rubs do not kill C  difficile bacteria  Always wash your hands well after you use the toilet, diaper a child, and before you prepare or serve food  Tell anyone who touches you to wear gloves and wash their hands  · Clean surfaces with bleach:  Clean tabletops, desks, and other surfaces before anyone else touches or uses them  Clean with chlorine-based disinfectants, such as household bleach  · Avoid the spread of C  difficile:  Do not share any items with other people  Use as many disposable items, such as paper plates, as you can  Do this until your diarrhea has gone away  When should I contact my healthcare provider? · You have a fever  · Your diarrhea is getting worse  · Your signs and symptoms get worse  · Your signs and symptoms do not go away, or they come back, even after treatment  · You cannot eat or drink  · You have questions or concerns about your condition or care    When should I seek immediate help or call 911? · You have a fever and stomach cramps that get worse, or do not go away  · Your abdomen is hard or feels swollen  · You have black or bright red stools  · You vomit blood  · You are short of breath, or feel like you are going to faint  · You have 1 or more of the following signs of dehydration:     ¨ Dizziness or weakness, or extreme sleepiness  ¨ Dry mouth, cracked lips, or you feel very thirsty    ¨ Fast heartbeat or rapid breathing    ¨ Very little urine or no urine    ¨ Sunken eyes     ¨ A child may be more irritable or fussy than usual  The soft spot on a baby's head may look sunken in  CARE AGREEMENT:   You have the right to help plan your care  Learn about your health condition and how it may be treated  Discuss treatment options with your caregivers to decide what care you want to receive  You always have the right to refuse treatment  The above information is an  only  It is not intended as medical advice for individual conditions or treatments  Talk to your doctor, nurse or pharmacist before following any medical regimen to see if it is safe and effective for you  © 2017 [employer]  Information is for End User's use only and may not be sold, redistributed or otherwise used for commercial purposes  All illustrations and images included in CareNotes® are the copyrighted property of [employer]  or [employer]  Extended Spectrum Beta Lactamase   WHAT YOU NEED TO KNOW:   What is Extended Spectrum Beta-Lactamase? Extended Spectrum Beta-Lactamase (ESBL) is an enzyme made by some bacteria  The enzyme prevents certain antibiotics from being able to kill the bacteria  The bacteria then become resistant to the antibiotics  This means stronger antibiotics must be used to kill the bacteria  ESBL infections usually occur in the urinary tract, lungs, skin, blood, or abdomen  How is ESBL spread?    · Touch:  ESBL can live on items such as medical equipment and bathrooms  Bacteria that produce ESBL can be spread to surfaces that are touched by someone who has contact with the bacteria  · Droplet: You can breathe in the bacteria when a person with an ESBL infection coughs or sneezes nearby  What increases my risk of infection with ESBL? Bacteria that produce ESBL live in your bowel and other parts of your body  When your immune system is weak, the risk for ESBL infection increases  The following may weaken your immune system and increase your infection risk:  · Long-term medical treatment:  You have been in the hospital for a long time or live in a long-term care facility  · Antibiotic use: You have received recent or long-term antibiotic treatment  · Tubes or lines in your body: An IV, urinary catheter, or breathing tube can become contaminated and cause infection  · Injuries: This includes surgical wounds or severe burns  Open wounds increase your risk for infection  · Chronic disease: You have a long-term medical condition, such as diabetes or cystic fibrosis  · Older age: As you age, your immune system may weaken  How is ESBL diagnosed? Your healthcare provider will ask about your health conditions and the medicines you take  Tell him about any recent hospital stays or surgeries  Healthcare providers will take samples of your blood, urine, bowel movement, or wounds  These samples are tested for infection  Other people who have been in contact with you may also be tested for ESBL  How is ESBL treated? Healthcare providers will do tests to find which antibiotics still work against the bacteria that are causing your infection  You will receive antibiotic medicine that will kill the bacteria  What are the risks of ESBL? ESBL infections are serious and can be life-threatening  Treatment may require hospital stays and long-term follow-up care   The stronger antibiotic medicines used to treat ESBL can cause side effects, such as nausea and diarrhea  The infection can worsen if there is any delay in treatment  How can I help prevent the spread of ESBL? You may be moved to a private room if you are in a healthcare setting  Healthcare providers and visitors may wear gowns and gloves during your care  Everyone must wash their hands before and after they leave your room  You can help prevent the spread of ESBL at home by doing the following:  · Wash your hands:  Use soap and hot water  Carry germ-killing gel with you if you do not have access to soap and water  Wash your hands before you make or eat food, or touch your eyes, nose, or mouth  Wash your hands after you blow your nose, sneeze, cough, or use the bathroom  Wash your hands after you touch tables, doorknobs, remote controls, or phones  · Clean surfaces:  Clean doorknobs, tables, and toilets often  Ask your healthcare provider what kind of  to use  · Use tissues:  Cover your mouth and nose with tissues when you cough or sneeze  Throw away the tissue after you use it  · Prevent another infection:  Stay at least 3 feet away from people who are coughing or sneezing  · Take antibiotics exactly as ordered:  Do not stop taking your antibiotic unless directed by your healthcare provider, even if you are feeling better sooner  The infection may return if you do not take all of your antibiotic  Follow up with your healthcare provider as directed  · Influenza vaccine  helps prevent influenza (flu)  Everyone older than 6 months should get a yearly influenza vaccine  Get the vaccine as soon as it is available, usually in September or October each year  Where can I get more information? · Centers for Disease Control and Prevention (CDC)  [address]erm[address] Carl Camara , [address]  Phone: 0- 024 - [phone]  Web Address: Mike carmona  When should I contact my healthcare provider?    · You have a fever, even after 3 days of antibiotic treatment  · You have questions or concerns about your condition or care  CARE AGREEMENT:   You have the right to help plan your care  Learn about your health condition and how it may be treated  Discuss treatment options with your caregivers to decide what care you want to receive  You always have the right to refuse treatment  The above information is an  only  It is not intended as medical advice for individual conditions or treatments  Talk to your doctor, nurse or pharmacist before following any medical regimen to see if it is safe and effective for you  © 2017 [employer]  Information is for End User's use only and may not be sold, redistributed or otherwise used for commercial purposes  All illustrations and images included in CareNotes® are the copyrighted property of [employer] A M [employer]  or [employer]

## 2020-03-08 NOTE — DISCHARGE INSTR - AVS FIRST PAGE
Please take all of the vancomycin as prescribed  You will need to have a follow-up stool culture to make sure that the C diff is gone  Make sure you tell Hematology that you are positive for C diff in your stool, and ESBL/E coli in your urine

## 2020-03-08 NOTE — ASSESSMENT & PLAN NOTE
· With hypotension earlier at office visit has resolved continue IV hydration  · PT OT eval is for discharge planning  · Full continue PT with home care Revolutionary nontender/no masses palpable/no distention/soft/bowel sounds normal

## 2020-03-08 NOTE — ASSESSMENT & PLAN NOTE
· Level was found to be 1 6  · Patient will be given 2 g IV bolus  · Start oral replacement   · Monitor labs

## 2020-03-08 NOTE — NURSING NOTE
Patient discharged home  IV removed  Verbalizes understanding of discharge instructions  Patient escorted out with all belongings by RN and PCA

## 2020-03-08 NOTE — ASSESSMENT & PLAN NOTE
· Level was found to be 3 3 on admission  · Patient will be started on 40 mEq p o  B i d  potassium supplement  · Will decrease to 20mEq po BID since diarrhea is slightly improving  · Has improved  · Will monitor labs

## 2020-03-08 NOTE — ASSESSMENT & PLAN NOTE
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

## 2020-03-08 NOTE — ASSESSMENT & PLAN NOTE
· Follows with Dr Yuliet Red  · Patient on lanreotide injections  · Has been having increasing diarrhea during previous hospitalizations also

## 2020-03-08 NOTE — PLAN OF CARE
Problem: INFECTION - ADULT  Goal: Absence or prevention of progression during hospitalization  Description  INTERVENTIONS:  - Assess and monitor for signs and symptoms of infection  - Monitor lab/diagnostic results  - Monitor all insertion sites, i e  indwelling lines, tubes, and drains  - Monitor endotracheal if appropriate and nasal secretions for changes in amount and color  - [place] appropriate cooling/warming therapies per order  - Administer medications as ordered  - Instruct and encourage patient and family to use good hand hygiene technique  - Identify and instruct in appropriate isolation precautions for identified infection/condition  Outcome: Progressing  Goal: Absence of fever/infection during neutropenic period  Description  INTERVENTIONS:  - Monitor WBC    Outcome: Progressing

## 2020-03-08 NOTE — ASSESSMENT & PLAN NOTE
Lab Results   Component Value Date    HGBA1C 8 1 (H) 06/14/2019       Recent Labs     03/07/20  1611 03/07/20  2017 03/08/20  0538 03/08/20  1111   POCGLU 164* 243* 126 197*       Blood Sugar Average: Last 72 hrs:  (P) 165 5   · Continue Lantus and insulin coverage as needed

## 2020-03-08 NOTE — ASSESSMENT & PLAN NOTE
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  · Patient is positive for E coli and ESBL  · Patient had IV Rocephin for 1 dose on 03/03/2020, and was discontinued  · After reviewing patient's past medical records for his urinalysis and urine cultures which were both positive for E coli and ESBL, this is not a new urinary tract infection in a patient with a chronic indwelling Haile  · Patient has had positive E coli and ESBL urine cultures obtained on the following dates:  · 01/27/2019, 02/03/2019, 04/29/2019, 06/03/2019, 06/17/2019, 06/24/2019, 06/25/2019, 06/28/2019, 07/22/2019, 09/15/2019, 09/27/2019, 11/30/2019, 01/10/2020, 02/08/2020, 03/03/2020

## 2020-03-09 LAB
BACTERIA BLD CULT: NORMAL
BACTERIA BLD CULT: NORMAL

## 2020-03-09 NOTE — UTILIZATION REVIEW
Notification of Discharge  This is a Notification of Discharge from our facility [address]  Please be advised that this patient has been discharge from our facility  Below you will find the admission and discharge date and time including the patients disposition  PRESENTATION DATE: 3/3/2020  2:38 PM  OBS ADMISSION DATE:   IP ADMISSION DATE: 3/3/20 1750   DISCHARGE DATE: 3/8/2020  2:21 PM  [facility] [address] [facility]   Admission Orders listed below:  Admission Orders (From admission, onward)     Ordered        03/03/20 1750  Inpatient Admission (expected length of stay for this patient Order details is greater than two midnights)  Once                   Please contact the UR Department if additional information is required to close this patient's authorization/case  [address] Utilization Review Department  Main: [phone] x carefully listen to the prompts  All voicemails are confidential   [email or web address]  Send all requests for admission clinical reviews, approved or denied determinations and any other requests to dedicated fax number below belonging to the campus where the patient is receiving treatment   List of dedicated fax numbers:  [address] [place] [address] DENIALS (Administrative/Medical Necessity) [phone]   [address]  (Maternity/NICU/Pediatrics) [phone]   Hammad العراقي [phone]   Luz Davis [phone]   Cm Jurado [phone]   Fa[address] [facility] [phone]   [facility]  [phone]   [address] [facility] [address]  [address] [facility] [address] [facility] [phone]

## 2020-03-11 ENCOUNTER — APPOINTMENT (EMERGENCY)
Dept: CT IMAGING | Facility: HOSPITAL | Age: 80
DRG: 698 | End: 2020-03-11
Payer: COMMERCIAL

## 2020-03-11 ENCOUNTER — HOSPITAL ENCOUNTER (OUTPATIENT)
Dept: INFUSION CENTER | Facility: HOSPITAL | Age: 80
Discharge: HOME | End: 2020-03-11
Payer: COMMERCIAL

## 2020-03-11 ENCOUNTER — APPOINTMENT (EMERGENCY)
Dept: RADIOLOGY | Facility: HOSPITAL | Age: 80
DRG: 698 | End: 2020-03-11
Payer: COMMERCIAL

## 2020-03-11 ENCOUNTER — APPOINTMENT (INPATIENT)
Dept: CT IMAGING | Facility: HOSPITAL | Age: 80
DRG: 698 | End: 2020-03-11
Payer: COMMERCIAL

## 2020-03-11 ENCOUNTER — HOSPITAL ENCOUNTER (INPATIENT)
Facility: HOSPITAL | Age: 80
LOS: 8 days | Discharge: HOME HEALTH | DRG: 698 | End: 2020-03-19
Attending: FAMILY MEDICINE | Admitting: FAMILY MEDICINE
Payer: COMMERCIAL

## 2020-03-11 VITALS
RESPIRATION RATE: 18 BRPM | HEART RATE: 85 BPM | TEMPERATURE: 97.8 F | OXYGEN SATURATION: 100 % | DIASTOLIC BLOOD PRESSURE: 72 MMHG | SYSTOLIC BLOOD PRESSURE: 140 MMHG

## 2020-03-11 DIAGNOSIS — N17.9 ACUTE ON CHRONIC RENAL FAILURE (HCC): ICD-10-CM

## 2020-03-11 DIAGNOSIS — N39.0 UTI (URINARY TRACT INFECTION): ICD-10-CM

## 2020-03-11 DIAGNOSIS — E87.5 HYPERKALEMIA: ICD-10-CM

## 2020-03-11 DIAGNOSIS — A04.72 C. DIFFICILE COLITIS: ICD-10-CM

## 2020-03-11 DIAGNOSIS — R55 SYNCOPE, UNSPECIFIED SYNCOPE TYPE: ICD-10-CM

## 2020-03-11 DIAGNOSIS — C7A.8 MALIGNANT NEUROENDOCRINE NEOPLASM (HCC): Primary | ICD-10-CM

## 2020-03-11 DIAGNOSIS — C7B.8 NEUROENDOCRINE CARCINOMA METASTATIC TO LIVER (HCC): ICD-10-CM

## 2020-03-11 DIAGNOSIS — E34.0 CARCINOID SYNDROME (HCC): ICD-10-CM

## 2020-03-11 DIAGNOSIS — N18.9 ACUTE ON CHRONIC RENAL FAILURE (HCC): ICD-10-CM

## 2020-03-11 DIAGNOSIS — C7A.8 NEUROENDOCRINE CARCINOMA METASTATIC TO LIVER (HCC): ICD-10-CM

## 2020-03-11 DIAGNOSIS — R53.81 PHYSICAL DECONDITIONING: ICD-10-CM

## 2020-03-11 DIAGNOSIS — D3A.8 NEUROENDOCRINE TUMOR: Chronic | ICD-10-CM

## 2020-03-11 DIAGNOSIS — R53.1 GENERALIZED WEAKNESS: ICD-10-CM

## 2020-03-11 DIAGNOSIS — D3A.8 NEUROENDOCRINE TUMOR: ICD-10-CM

## 2020-03-11 DIAGNOSIS — R55 SYNCOPE: Primary | ICD-10-CM

## 2020-03-11 PROBLEM — E11.65 TYPE 2 DIABETES MELLITUS WITH HYPERGLYCEMIA, WITH LONG-TERM CURRENT USE OF INSULIN (HCC): Chronic | Status: ACTIVE | Noted: 2019-01-23

## 2020-03-11 PROBLEM — A41.9 SEPSIS (HCC): Status: ACTIVE | Noted: 2020-03-11

## 2020-03-11 PROBLEM — Z22.1 CLOSTRIDIUM DIFFICILE CARRIER: Status: ACTIVE | Noted: 2020-03-11

## 2020-03-11 PROBLEM — E87.2 INCREASED ANION GAP METABOLIC ACIDOSIS: Status: ACTIVE | Noted: 2020-03-11

## 2020-03-11 PROBLEM — R77.8 ELEVATED TOTAL PROTEIN: Status: ACTIVE | Noted: 2020-03-11

## 2020-03-11 LAB
ALBUMIN SERPL BCP-MCNC: 3.2 G/DL (ref 3.5–5)
ALP SERPL-CCNC: 156 U/L (ref 46–116)
ALT SERPL W P-5'-P-CCNC: 25 U/L (ref 12–78)
ANION GAP SERPL CALCULATED.3IONS-SCNC: 15 MMOL/L (ref 4–13)
ARTERIAL PATENCY WRIST A: YES
AST SERPL W P-5'-P-CCNC: 23 U/L (ref 5–45)
BACTERIA UR QL AUTO: ABNORMAL /HPF
BASE EXCESS BLDA CALC-SCNC: -10.8 MMOL/L
BASOPHILS # BLD AUTO: 0.07 THOUSANDS/ΜL (ref 0–0.1)
BASOPHILS NFR BLD AUTO: 0 % (ref 0–1)
BILIRUB SERPL-MCNC: 0.6 MG/DL (ref 0.2–1)
BILIRUB UR QL STRIP: NEGATIVE
BUN SERPL-MCNC: 53 MG/DL (ref 5–25)
CALCIUM SERPL-MCNC: 9.8 MG/DL (ref 8.3–10.1)
CHLORIDE SERPL-SCNC: 105 MMOL/L (ref 100–108)
CLARITY UR: ABNORMAL
CO2 SERPL-SCNC: 16 MMOL/L (ref 21–32)
COLOR UR: YELLOW
CREAT SERPL-MCNC: 2.69 MG/DL (ref 0.6–1.3)
EOSINOPHIL # BLD AUTO: 0.01 THOUSAND/ΜL (ref 0–0.61)
EOSINOPHIL NFR BLD AUTO: 0 % (ref 0–6)
ERYTHROCYTE [DISTWIDTH] IN BLOOD BY AUTOMATED COUNT: 15.9 % (ref 11.6–15.1)
FLUAV RNA NPH QL NAA+PROBE: NORMAL
FLUBV RNA NPH QL NAA+PROBE: NORMAL
GFR SERPL CREATININE-BSD FRML MDRD: 21 ML/MIN/1.73SQ M
GLUCOSE SERPL-MCNC: 246 MG/DL (ref 65–140)
GLUCOSE SERPL-MCNC: 248 MG/DL (ref 65–140)
GLUCOSE SERPL-MCNC: 255 MG/DL (ref 65–140)
GLUCOSE UR STRIP-MCNC: NEGATIVE MG/DL
HCO3 BLDA-SCNC: 13.4 MMOL/L (ref 22–28)
HCT VFR BLD AUTO: 35.1 % (ref 36.5–49.3)
HGB BLD-MCNC: 11.2 G/DL (ref 12–17)
HGB UR QL STRIP.AUTO: ABNORMAL
IMM GRANULOCYTES # BLD AUTO: 0.11 THOUSAND/UL (ref 0–0.2)
IMM GRANULOCYTES NFR BLD AUTO: 1 % (ref 0–2)
KETONES UR STRIP-MCNC: NEGATIVE MG/DL
LACTATE SERPL-SCNC: 1.3 MMOL/L (ref 0.5–2)
LEUKOCYTE ESTERASE UR QL STRIP: ABNORMAL
LYMPHOCYTES # BLD AUTO: 0.86 THOUSANDS/ΜL (ref 0.6–4.47)
LYMPHOCYTES NFR BLD AUTO: 5 % (ref 14–44)
MCH RBC QN AUTO: 30.6 PG (ref 26.8–34.3)
MCHC RBC AUTO-ENTMCNC: 31.9 G/DL (ref 31.4–37.4)
MCV RBC AUTO: 96 FL (ref 82–98)
MONOCYTES # BLD AUTO: 1.27 THOUSAND/ΜL (ref 0.17–1.22)
MONOCYTES NFR BLD AUTO: 8 % (ref 4–12)
NASAL CANNULA: ABNORMAL
NEUTROPHILS # BLD AUTO: 14.42 THOUSANDS/ΜL (ref 1.85–7.62)
NEUTS SEG NFR BLD AUTO: 86 % (ref 43–75)
NITRITE UR QL STRIP: POSITIVE
NON-SQ EPI CELLS URNS QL MICRO: ABNORMAL /HPF
NRBC BLD AUTO-RTO: 0 /100 WBCS
O2 CT BLDA-SCNC: 15 ML/DL (ref 16–23)
OTHER STN SPEC: ABNORMAL
OXYHGB MFR BLDA: 97.8 % (ref 94–97)
PCO2 BLDA: 25.2 MM HG (ref 36–44)
PH BLDA: 7.34 [PH] (ref 7.35–7.45)
PH UR STRIP.AUTO: 5.5 [PH]
PLATELET # BLD AUTO: 190 THOUSANDS/UL (ref 149–390)
PMV BLD AUTO: 10.7 FL (ref 8.9–12.7)
PO2 BLDA: 132.6 MM HG (ref 75–129)
POTASSIUM SERPL-SCNC: 5.7 MMOL/L (ref 3.5–5.3)
PROT SERPL-MCNC: 8.4 G/DL (ref 6.4–8.2)
PROT UR STRIP-MCNC: ABNORMAL MG/DL
RBC # BLD AUTO: 3.66 MILLION/UL (ref 3.88–5.62)
RBC #/AREA URNS AUTO: ABNORMAL /HPF
RSV RNA NPH QL NAA+PROBE: NORMAL
SODIUM SERPL-SCNC: 136 MMOL/L (ref 136–145)
SP GR UR STRIP.AUTO: 1.02 (ref 1–1.03)
SPECIMEN SOURCE: ABNORMAL
TROPONIN I SERPL-MCNC: <0.02 NG/ML
UROBILINOGEN UR QL STRIP.AUTO: 0.2 E.U./DL
WBC # BLD AUTO: 16.74 THOUSAND/UL (ref 4.31–10.16)
WBC #/AREA URNS AUTO: ABNORMAL /HPF

## 2020-03-11 PROCEDURE — 80053 COMPREHEN METABOLIC PANEL: CPT | Performed by: PHYSICIAN ASSISTANT

## 2020-03-11 PROCEDURE — 87086 URINE CULTURE/COLONY COUNT: CPT | Performed by: PHYSICIAN ASSISTANT

## 2020-03-11 PROCEDURE — 99223 1ST HOSP IP/OBS HIGH 75: CPT | Performed by: INTERNAL MEDICINE

## 2020-03-11 PROCEDURE — 89055 LEUKOCYTE ASSESSMENT FECAL: CPT | Performed by: INTERNAL MEDICINE

## 2020-03-11 PROCEDURE — 74176 CT ABD & PELVIS W/O CONTRAST: CPT

## 2020-03-11 PROCEDURE — 36415 COLL VENOUS BLD VENIPUNCTURE: CPT | Performed by: PHYSICIAN ASSISTANT

## 2020-03-11 PROCEDURE — 87493 C DIFF AMPLIFIED PROBE: CPT | Performed by: INTERNAL MEDICINE

## 2020-03-11 PROCEDURE — 99285 EMERGENCY DEPT VISIT HI MDM: CPT | Performed by: PHYSICIAN ASSISTANT

## 2020-03-11 PROCEDURE — 93005 ELECTROCARDIOGRAM TRACING: CPT

## 2020-03-11 PROCEDURE — 99285 EMERGENCY DEPT VISIT HI MDM: CPT

## 2020-03-11 PROCEDURE — 87106 FUNGI IDENTIFICATION YEAST: CPT | Performed by: PHYSICIAN ASSISTANT

## 2020-03-11 PROCEDURE — 87081 CULTURE SCREEN ONLY: CPT | Performed by: INTERNAL MEDICINE

## 2020-03-11 PROCEDURE — 81001 URINALYSIS AUTO W/SCOPE: CPT | Performed by: PHYSICIAN ASSISTANT

## 2020-03-11 PROCEDURE — 36600 WITHDRAWAL OF ARTERIAL BLOOD: CPT

## 2020-03-11 PROCEDURE — 87425 ROTAVIRUS AG IA: CPT | Performed by: INTERNAL MEDICINE

## 2020-03-11 PROCEDURE — 84484 ASSAY OF TROPONIN QUANT: CPT | Performed by: PHYSICIAN ASSISTANT

## 2020-03-11 PROCEDURE — 87186 SC STD MICRODIL/AGAR DIL: CPT | Performed by: PHYSICIAN ASSISTANT

## 2020-03-11 PROCEDURE — 87077 CULTURE AEROBIC IDENTIFY: CPT | Performed by: PHYSICIAN ASSISTANT

## 2020-03-11 PROCEDURE — 71250 CT THORAX DX C-: CPT

## 2020-03-11 PROCEDURE — 0T2BX0Z CHANGE DRAINAGE DEVICE IN BLADDER, EXTERNAL APPROACH: ICD-10-PCS | Performed by: INTERNAL MEDICINE

## 2020-03-11 PROCEDURE — 87040 BLOOD CULTURE FOR BACTERIA: CPT | Performed by: PHYSICIAN ASSISTANT

## 2020-03-11 PROCEDURE — 83605 ASSAY OF LACTIC ACID: CPT | Performed by: PHYSICIAN ASSISTANT

## 2020-03-11 PROCEDURE — 87631 RESP VIRUS 3-5 TARGETS: CPT | Performed by: INTERNAL MEDICINE

## 2020-03-11 PROCEDURE — 82805 BLOOD GASES W/O2 SATURATION: CPT | Performed by: INTERNAL MEDICINE

## 2020-03-11 PROCEDURE — 87181 SC STD AGAR DILUTION PER AGT: CPT | Performed by: PHYSICIAN ASSISTANT

## 2020-03-11 PROCEDURE — 70450 CT HEAD/BRAIN W/O DYE: CPT

## 2020-03-11 PROCEDURE — 96360 HYDRATION IV INFUSION INIT: CPT

## 2020-03-11 PROCEDURE — 85025 COMPLETE CBC W/AUTO DIFF WBC: CPT | Performed by: PHYSICIAN ASSISTANT

## 2020-03-11 PROCEDURE — 71045 X-RAY EXAM CHEST 1 VIEW: CPT

## 2020-03-11 PROCEDURE — 96372 THER/PROPH/DIAG INJ SC/IM: CPT

## 2020-03-11 PROCEDURE — 82948 REAGENT STRIP/BLOOD GLUCOSE: CPT

## 2020-03-11 PROCEDURE — 87505 NFCT AGENT DETECTION GI: CPT | Performed by: INTERNAL MEDICINE

## 2020-03-11 RX ORDER — SODIUM CHLORIDE, SODIUM GLUCONATE, SODIUM ACETATE, POTASSIUM CHLORIDE, MAGNESIUM CHLORIDE, SODIUM PHOSPHATE, DIBASIC, AND POTASSIUM PHOSPHATE .53; .5; .37; .037; .03; .012; .00082 G/100ML; G/100ML; G/100ML; G/100ML; G/100ML; G/100ML; G/100ML
50 INJECTION, SOLUTION INTRAVENOUS CONTINUOUS
Status: DISCONTINUED | OUTPATIENT
Start: 2020-03-11 | End: 2020-03-16

## 2020-03-11 RX ORDER — HEPARIN SODIUM 5000 [USP'U]/ML
5000 INJECTION, SOLUTION INTRAVENOUS; SUBCUTANEOUS EVERY 8 HOURS SCHEDULED
Status: DISCONTINUED | OUTPATIENT
Start: 2020-03-11 | End: 2020-03-19 | Stop reason: HOSPADM

## 2020-03-11 RX ORDER — ASPIRIN 81 MG/1
81 TABLET, CHEWABLE ORAL DAILY
Status: DISCONTINUED | OUTPATIENT
Start: 2020-03-12 | End: 2020-03-19 | Stop reason: HOSPADM

## 2020-03-11 RX ORDER — MELATONIN
1000 DAILY
Status: DISCONTINUED | OUTPATIENT
Start: 2020-03-12 | End: 2020-03-19 | Stop reason: HOSPADM

## 2020-03-11 RX ORDER — LANREOTIDE ACETATE 120 MG/.5ML
120 INJECTION SUBCUTANEOUS ONCE
Status: COMPLETED | OUTPATIENT
Start: 2020-03-11 | End: 2020-03-11

## 2020-03-11 RX ORDER — CEFEPIME HYDROCHLORIDE 1 G/50ML
1000 INJECTION, SOLUTION INTRAVENOUS ONCE
Status: COMPLETED | OUTPATIENT
Start: 2020-03-11 | End: 2020-03-11

## 2020-03-11 RX ORDER — CHOLECALCIFEROL (VITAMIN D3) 10 MCG
1 TABLET ORAL
Status: DISCONTINUED | OUTPATIENT
Start: 2020-03-11 | End: 2020-03-19 | Stop reason: HOSPADM

## 2020-03-11 RX ORDER — LANREOTIDE ACETATE 120 MG/.5ML
120 INJECTION SUBCUTANEOUS ONCE
Status: CANCELLED | OUTPATIENT
Start: 2020-04-08

## 2020-03-11 RX ORDER — DEXTROSE MONOHYDRATE 25 G/50ML
25 INJECTION, SOLUTION INTRAVENOUS AS NEEDED
Status: DISCONTINUED | OUTPATIENT
Start: 2020-03-11 | End: 2020-03-19 | Stop reason: HOSPADM

## 2020-03-11 RX ADMIN — VANCOMYCIN HYDROCHLORIDE 125 MG: 500 INJECTION, POWDER, LYOPHILIZED, FOR SOLUTION INTRAVENOUS at 18:24

## 2020-03-11 RX ADMIN — Medication 1 CAPSULE: at 18:23

## 2020-03-11 RX ADMIN — INSULIN LISPRO 2 UNITS: 100 INJECTION, SOLUTION INTRAVENOUS; SUBCUTANEOUS at 18:22

## 2020-03-11 RX ADMIN — PIPERACILLIN AND TAZOBACTAM 2.25 G: 2; .25 INJECTION, POWDER, FOR SOLUTION INTRAVENOUS at 18:23

## 2020-03-11 RX ADMIN — HEPARIN SODIUM 5000 UNITS: 5000 INJECTION INTRAVENOUS; SUBCUTANEOUS at 18:22

## 2020-03-11 RX ADMIN — VANCOMYCIN HYDROCHLORIDE 1000 MG: 1 INJECTION, POWDER, LYOPHILIZED, FOR SOLUTION INTRAVENOUS at 15:03

## 2020-03-11 RX ADMIN — INSULIN DETEMIR 10 UNITS: 100 INJECTION, SOLUTION SUBCUTANEOUS at 22:13

## 2020-03-11 RX ADMIN — CEFEPIME HYDROCHLORIDE 1000 MG: 1 INJECTION, SOLUTION INTRAVENOUS at 14:27

## 2020-03-11 RX ADMIN — INSULIN LISPRO 2 UNITS: 100 INJECTION, SOLUTION INTRAVENOUS; SUBCUTANEOUS at 22:13

## 2020-03-11 RX ADMIN — SODIUM CHLORIDE, SODIUM GLUCONATE, SODIUM ACETATE, POTASSIUM CHLORIDE, MAGNESIUM CHLORIDE, SODIUM PHOSPHATE, DIBASIC, AND POTASSIUM PHOSPHATE 75 ML/HR: .53; .5; .37; .037; .03; .012; .00082 INJECTION, SOLUTION INTRAVENOUS at 16:20

## 2020-03-11 RX ADMIN — LANREOTIDE ACETATE 120 MG: 120 INJECTION SUBCUTANEOUS at 08:56

## 2020-03-11 RX ADMIN — SODIUM CHLORIDE 500 ML: 0.9 INJECTION, SOLUTION INTRAVENOUS at 10:08

## 2020-03-11 NOTE — SEPSIS NOTE
Sepsis Note   Jay Jay Queen 79 y o  male MRN: [id number]  Unit/Bed#: RM01 Encounter: [id number]      qSOFA     [address] Name 03/11/20 1116 03/11/20 1014 03/11/20 0917          Altered mental status GCS < 15  --  0  --      Respiratory Rate > / =22  0  --  0      Systolic BP < / =495  0  --  0      Q Sofa Score  0  0  0          Initial Sepsis Screening     Row Name 03/11/20 1224                Is the patient's history suggestive of a new or worsening infection? (!) Yes (Proceed)  -DD        Suspected source of infection  suspect infection, source unknown  -DD        Are two or more of the following signs & symptoms of infection both present and new to the patient? (!) Yes (Proceed)  -DD        Indicate SIRS criteria  Leukocytosis (WBC > 27731 IJL); Hypothermia < 36C (96 8F)  -DD        If the answer is yes to both questions, suspicion of sepsis is present  --        If severe sepsis is present AND tissue hypoperfusion perists in the hour after fluid resuscitation or lactate > 4, the patient meets criteria for SEPTIC SHOCK  --        Are any of the following organ dysfunction criteria present within 6 hours of suspected infection and SIRS criteria that are NOT considered to be chronic conditions?   No  -DD        Organ dysfunction  --        Date of presentation of severe sepsis  --        Time of presentation of severe sepsis  --        Tissue hypoperfusion persists in the hour after crystalloid fluid administration, evidenced, by either:  --        Was hypotension present within one hour of the conclusion of crystalloid fluid administration?  --        Date of presentation of septic shock  --        Time of presentation of septic shock  --          User Key  (r) = Recorded By, (t) = Taken By, (c) = Cosigned By    Atul Lock Provider Type    DD Daniel Sinclair PA-C Physician Assistant

## 2020-03-11 NOTE — ED PROVIDER NOTES
History  Chief Complaint   Patient presents with    Syncope     after injection of Somatyline  at infusion center  passed out  This is an 16-year-old male patient who was up at the infusion center receiving an injection of Somatuline which is a deep subcutaneous injection  He has had this before  The nurse did administer it into his buttock and then soon after that he became unresponsive  It was felt by the internal medicine doctor who responded that he syncopized  The patient denies any chest pain or shortness of breath pre or post injection  He is alert and oriented but the wife states he is still not acting himself  He does seem a bit transient  He states that he feels better  At the time of syncope he did turn pale and diaphoretic he did awake rapidly  The wife states that he was recently discharged from the hospital and she felt that it was too soon  Patient received fluid with cardiac workup and will have his head scanned due to fact he has incompletely return to baseline  He is currently nontoxic in no acute distress responsive positive stimuli appropriately patient does have a history of C diff which she is being treated and history of hyponatremia  Prior to Admission Medications   Prescriptions Last Dose Informant Patient Reported? Taking?    BD INSULIN SYRINGE U/F 30G X 1/2" 0 3 ML MISC   Yes No   Insulin Syringe-Needle U-100 30G X 1/2" 1 ML MISC  Spouse/Significant Other No No   Sig: by Does not apply route 3 (three) times a day   acetaminophen (TYLENOL) 325 mg tablet Unknown at Unknown time Spouse/Significant Other Yes No   Sig: Take 650 mg by mouth every 6 (six) hours as needed    amLODIPine (NORVASC) 10 mg tablet 3/10/2020 at Unknown time Spouse/Significant Other Yes Yes   Sig: Take 10 mg by mouth daily   aspirin 81 MG tablet 3/10/2020 at Unknown time Spouse/Significant Other Yes Yes   Sig: Take 81 mg by mouth daily   b complex-vitamin C-folic acid (NEPHROCAPS) 1 mg capsule 3/10/2020 at Unknown time Spouse/Significant Other No Yes   Sig: Take 1 capsule by mouth daily with dinner   cholecalciferol (VITAMIN D3) 1,000 units tablet 3/10/2020 at Unknown time Spouse/Significant Other Yes Yes   Sig: Take 1,000 Units by mouth daily   colchicine (COLCRYS) 0 6 mg tablet 3/10/2020 at Unknown time Spouse/Significant Other No Yes   Sig: Take 0 5 tablets (0 3 mg total) by mouth daily   Patient taking differently: Take 0 6 mg by mouth daily    insulin detemir (LEVEMIR) 100 units/mL subcutaneous injection 3/10/2020 at Unknown time Spouse/Significant Other Yes Yes   Sig: Inject 10 Units under the skin daily at bedtime   insulin lispro (HumaLOG) 100 units/mL injection 3/10/2020 at Unknown time Spouse/Significant Other Yes Yes   Sig: Inject under the skin 3 (three) times a day before meals   magnesium oxide (MAG-OX) 400 mg 3/10/2020 at Unknown time  No Yes   Sig: Take 1 tablet (400 mg total) by mouth 2 (two) times a day   methenamine hippurate (HIPREX) 1 g tablet 3/10/2020 at Unknown time Spouse/Significant Other No Yes   Sig: Take 1 tablet (1 g total) by mouth 3 (three) times a day   oxyCODONE-acetaminophen (PERCOCET) 5-325 mg per tablet More than a month at Unknown time Spouse/Significant Other Yes No   Sig: Take 1 tablet by mouth every 4 (four) hours as needed for moderate pain   pantoprazole (PROTONIX) 40 mg tablet 3/10/2020 at Unknown time Spouse/Significant Other No Yes   Sig: Take 1 tablet (40 mg total) by mouth daily in the early morning   potassium chloride (K-DUR,KLOR-CON) 20 mEq tablet 3/10/2020 at Unknown time  No Yes   Sig: Take 1 tablet (20 mEq total) by mouth daily   simethicone (MYLICON) 80 mg chewable tablet Past Month at Unknown time Spouse/Significant Other No Yes   Sig: Chew 1 tablet (80 mg total) every 6 (six) hours as needed for flatulence   tamsulosin (FLOMAX) 0 4 mg 3/10/2020 at Unknown time Spouse/Significant Other No Yes   Sig: Take 1 capsule (0 4 mg total) by mouth daily with dinner   vancomycin (VANCOCIN) 50mg/mL SOLN 3/11/2020 at Unknown time  No Yes   Sig: Take 2 5 mL (125 mg total) by mouth every 6 (six) hours for 10 days, THEN 2 5 mL (125 mg total) every 12 (twelve) hours for 7 days, THEN 2 5 mL (125 mg total) daily for 7 days, THEN 2 5 mL (125 mg total) every other day  Facility-Administered Medications: None       Past Medical History:   Diagnosis Date    Acute pancreatitis without infection or necrosis 9/26/2019    Anemia of chronic renal failure     BPH (benign prostatic hyperplasia)     Cancer (HCC)     liver cancer    Cardiac disease     Chronic kidney disease, stage 3 (HCC)     Coronary artery disease     Diabetes mellitus (HCC)     DJD (degenerative joint disease)     Essential hypertension     Gait disturbance     Generalized weakness     GERD (gastroesophageal reflux disease)     Gout     History of transfusion     Hydronephrosis     Hyperlipidemia     Hypertension     Liver cancer ([facility] [address] )     Pressure injury of skin     Proteinuria     Renal disorder     Retention of urine     Thrombophlebitis migrans     Type 2 diabetes mellitus ([facility] [address] )     Type 2 diabetes 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, with long-term current use of insulin ([facility] ) 1/23/2019       Past Surgical History:   Procedure Laterality Date    CHOLECYSTECTOMY      CHOLECYSTECTOMY LAPAROSCOPIC N/A 2/7/2020    Procedure: CHOLECYSTECTOMY LAPAROSCOPIC;  Surgeon: Twan Ledezma MD;  Location: [address] [place] [address] OR;  Service: General    CORONARY ANGIOPLASTY WITH STENT PLACEMENT      IR IMAGE GUIDED BIOPSY/ASPIRATION LIVER  1/24/2019    IR TUBE PLACEMENT ROQUE  9/6/2019       Family History   Problem Relation Age of Onset    Cancer Mother     Hypertension Father     Cancer Brother     Cancer Maternal Grandmother     Cancer Paternal Aunt      I have reviewed and agree with the history as documented      E-Cigarette/Vaping    E-Cigarette Use Never User      E-Cigarette/Vaping Substances     Social History     Tobacco Use    Smoking status: Never Smoker    Smokeless tobacco: Never Used   Substance Use Topics    Alcohol use: Not Currently     Alcohol/week: 0 0 standard drinks     Frequency: Never    Drug use: Never       Review of Systems   Constitutional: Negative for diaphoresis, fatigue and fever  HENT: Negative for congestion, ear pain, nosebleeds and sore throat  Eyes: Negative for photophobia, pain, discharge and visual disturbance  Respiratory: Negative for cough, choking, chest tightness, shortness of breath and wheezing  Cardiovascular: Negative for chest pain and palpitations  Gastrointestinal: Negative for abdominal distention, abdominal pain, diarrhea and vomiting  Genitourinary: Negative for dysuria, flank pain and frequency  Musculoskeletal: Negative for back pain, gait problem and joint swelling  Skin: Negative for color change and rash  Neurological: Negative for dizziness, syncope and headaches  Psychiatric/Behavioral: Negative for behavioral problems and confusion  The patient is not nervous/anxious  All other systems reviewed and are negative  Physical Exam  Physical Exam   Constitutional: He appears well-developed and well-nourished  HENT:   Head: Normocephalic and atraumatic  Right Ear: External ear normal    Left Ear: External ear normal    Nose: Nose normal    Mouth/Throat: Oropharynx is clear and moist    Eyes: Pupils are equal, round, and reactive to light  Conjunctivae are normal    Neck: Normal range of motion  Neck supple  Cardiovascular: Normal rate and regular rhythm  Pulmonary/Chest: Effort normal and breath sounds normal    Abdominal: Soft  Bowel sounds are normal  There is no tenderness  Neurological: He is alert  Skin: Skin is warm  Psychiatric: He has a normal mood and affect  His behavior is normal    Nursing note and vitals reviewed        Vital Signs  ED Triage Vitals   Temperature Pulse Respirations Blood Pressure SpO2   03/11/20 0917 03/11/20 0917 03/11/20 0917 03/11/20 0917 03/11/20 0917   (!) 96 7 °F (35 9 °C) 87 20 116/69 100 %      Temp Source Heart Rate Source Patient Position - Orthostatic VS BP Location FiO2 (%)   03/11/20 0917 03/11/20 0917 03/11/20 1116 03/11/20 0917 --   Temporal Monitor Lying Left arm       Pain Score       03/11/20 1014       No Pain           Vitals:    03/11/20 1300 03/11/20 1507 03/11/20 1536 03/11/20 1901   BP: 164/78 (!) 171/81 146/82 140/67   Pulse: 74 79 83 78   Patient Position - Orthostatic VS:  Lying           Visual Acuity  Visual Acuity      Most Recent Value   L Pupil Size (mm)  3   R Pupil Size (mm)  3          ED Medications  Medications   piperacillin-tazobactam (ZOSYN) 2 25 g in sodium chloride 0 9 % 50 mL IVPB (2 25 g Intravenous New Bag 3/11/20 1823)   insulin lispro (HumaLOG) 100 units/mL subcutaneous injection 1-5 Units (2 Units Subcutaneous Given 3/11/20 1822)   insulin lispro (HumaLOG) 100 units/mL subcutaneous injection 1-5 Units (has no administration in time range)   dextrose 50 % IV solution 25 mL (has no administration in time range)   multi-electrolyte (PLASMALYTE-A/ISOLYTE-S PH 7 4) IV solution (75 mL/hr Intravenous New Bag 3/11/20 1620)   vancomycin (VANCOCIN) 750 mg in sodium chloride 0 9 % 250 mL IVPB (has no administration in time range)   vancomycin (VANCOCIN) oral solution 125 mg (125 mg Oral Given 3/11/20 1824)   aspirin chewable tablet 81 mg (has no administration in time range)   b complex-vitamin C-folic acid (NEPHROCAPS) capsule 1 capsule (1 capsule Oral Given 3/11/20 1823)   cholecalciferol (VITAMIN D3) tablet 1,000 Units (has no administration in time range)   insulin detemir (LEVEMIR) subcutaneous injection 10 Units (has no administration in time range)   heparin (porcine) subcutaneous injection 5,000 Units (5,000 Units Subcutaneous Given 3/11/20 1822)   sodium chloride 0 9 % bolus 500 mL (0 mL Intravenous Stopped 3/11/20 1116)   cefepime (MAXIPIME) IVPB (premix) 1,000 mg (0 mg Intravenous Stopped 3/11/20 1457)   vancomycin (VANCOCIN) IV piggyback 1,000 mg (1,000 mg Intravenous Given 3/11/20 1503)       Diagnostic Studies  Results Reviewed     Procedure Component Value Units Date/Time    Rotavirus antigen, stool [[id number]] Collected:  03/11/20 1909    Lab Status: In process Specimen:  Stool from Per Rectum Updated:  03/11/20 1915    Fecal leukocytes [[id number]] Collected:  03/11/20 1910    Lab Status: In process Specimen:  Stool from Per Rectum Updated:  03/11/20 1915    Clostridium difficile toxin by PCR with EIA [[id number]] Collected:  03/11/20 1910    Lab Status: In process Specimen:  Stool from Per Rectum Updated:  03/11/20 1914    Stool Enteric Bacterial Panel by PCR [[id number]] Collected:  03/11/20 1909    Lab Status: In process Specimen:  Stool from Per Rectum Updated:  03/11/20 1914    Blood culture #1 [[id number]] Collected:  03/11/20 1139    Lab Status:  Preliminary result Specimen:  Blood from Arm, Right Updated:  03/11/20 1701     Blood Culture Received in Microbiology Lab  Culture in Progress  Blood culture #2 [[id number]] Collected:  03/11/20 1140    Lab Status:  Preliminary result Specimen:  Blood from Hand, Left Updated:  03/11/20 1701     Blood Culture Received in Microbiology Lab  Culture in Progress  Influenza A/B and RSV PCR [[id number]]  (Normal) Collected:  03/11/20 1506    Lab Status:  Final result Specimen:  Nasopharyngeal from Nose Updated:  03/11/20 1610     INFLUENZA A PCR None Detected     INFLUENZA B PCR None Detected     RSV PCR None Detected    MRSA culture [[id number]] Collected:  03/11/20 1506    Lab Status:   In process Specimen:  Nares from Nose Updated:  03/11/20 1512    Blood gas, arterial [[id number]]  (Abnormal) Collected:  03/11/20 1400    Lab Status:  Final result Specimen:  Blood, Arterial from Radial, Left Updated:  03/11/20 1407     pH, Arterial 7 344     pCO2, Arterial 25 2 mm Hg      pO2, Arterial 132 6 mm Hg      HCO3, Arterial 13 4 mmol/L      Base Excess, Arterial -10 8 mmol/L      O2 Content, Arterial 15 0 mL/dL      O2 HGB,Arterial  97 8 %      SOURCE Radial, Left     JOSE TEST Yes     Nasal Cannula 2L    Lactic acid x2 Q2H [[id number]]  (Normal) Collected:  03/11/20 1139    Lab Status:  Final result Specimen:  Blood from Hand, Left Updated:  03/11/20 1213     LACTIC ACID 1 3 mmol/L     Narrative:       Result may be elevated if tourniquet was used during collection  Urine Microscopic [[id number]]  (Abnormal) Collected:  03/11/20 1143    Lab Status:  Final result Specimen:  Urine, Indwelling Haile Catheter Updated:  03/11/20 1210     RBC, UA 2-4 /hpf      WBC, UA Innumerable /hpf      Epithelial Cells Occasional /hpf      Bacteria, UA Innumerable /hpf      OTHER OBSERVATIONS Yeast Cells Present    Urine culture [[id number]] Collected:  03/11/20 1143    Lab Status:   In process Specimen:  Urine, Indwelling Haile Catheter Updated:  03/11/20 1210    UA w Reflex to Microscopic w Reflex to Culture [[id number]]  (Abnormal) Collected:  03/11/20 1143    Lab Status:  Final result Specimen:  Urine, Indwelling Haile Catheter Updated:  03/11/20 1151     Color, UA Yellow     Clarity, UA Cloudy     Specific Gravity, UA 1 025     pH, UA 5 5     Leukocytes, UA Large     Nitrite, UA Positive     Protein, UA 30 (1+) mg/dl      Glucose, UA Negative mg/dl      Ketones, UA Negative mg/dl      Urobilinogen, UA 0 2 E U /dl      Bilirubin, UA Negative     Blood, UA Small    Comprehensive metabolic panel [[id number]]  (Abnormal) Collected:  03/11/20 1007    Lab Status:  Final result Specimen:  Blood from Arm, Right Updated:  03/11/20 1035     Sodium 136 mmol/L      Potassium 5 7 mmol/L      Chloride 105 mmol/L      CO2 16 mmol/L      ANION GAP 15 mmol/L      BUN 53 mg/dL      Creatinine 2 69 mg/dL      Glucose 248 mg/dL      Calcium 9 8 mg/dL      AST 23 U/L      ALT 25 U/L      Alkaline Phosphatase 156 U/L      Total Protein 8 4 g/dL      Albumin 3 2 g/dL      Total Bilirubin 0 60 mg/dL      eGFR 21 ml/min/1 73sq m     Narrative:       National Kidney Disease Foundation guidelines for Chronic Kidney Disease (CKD):     Stage 1 with normal or high GFR (GFR > 90 mL/min/1 73 square meters)    Stage 2 Mild CKD (GFR = 60-89 mL/min/1 73 square meters)    Stage 3A Moderate CKD (GFR = 45-59 mL/min/1 73 square meters)    Stage 3B Moderate CKD (GFR = 30-44 mL/min/1 73 square meters)    Stage 4 Severe CKD (GFR = 15-29 mL/min/1 73 square meters)    Stage 5 End Stage CKD (GFR <15 mL/min/1 73 square meters)  Note: GFR calculation is accurate only with a steady state creatinine    Troponin I [[id number]]  (Normal) Collected:  03/11/20 1007    Lab Status:  Final result Specimen:  Blood from Arm, Right Updated:  03/11/20 1033     Troponin I <0 02 ng/mL     CBC and differential [[id number]]  (Abnormal) Collected:  03/11/20 1007    Lab Status:  Final result Specimen:  Blood from Arm, Right Updated:  03/11/20 1015     WBC 16 74 Thousand/uL      RBC 3 66 Million/uL      Hemoglobin 11 2 g/dL      Hematocrit 35 1 %      MCV 96 fL      MCH 30 6 pg      MCHC 31 9 g/dL      RDW 15 9 %      MPV 10 7 fL      Platelets 599 Thousands/uL      nRBC 0 /100 WBCs      Neutrophils Relative 86 %      Immat GRANS % 1 %      Lymphocytes Relative 5 %      Monocytes Relative 8 %      Eosinophils Relative 0 %      Basophils Relative 0 %      Neutrophils Absolute 14 42 Thousands/µL      Immature Grans Absolute 0 11 Thousand/uL      Lymphocytes Absolute 0 86 Thousands/µL      Monocytes Absolute 1 27 Thousand/µL      Eosinophils Absolute 0 01 Thousand/µL      Basophils Absolute 0 07 Thousands/µL                  CT chest abdomen pelvis wo contrast   Final Result by Merline Spell, MD (03/11 1428)      No evidence of acute abnormality in the chest, abdomen or pelvis within the limits of this unenhanced exam       Unchanged ectasia of the ascending aorta measuring 4 3 cm in diameter  Stable centrally calcified retractile mesenteric mass in keeping with patient's known carcinoid tumor  Known hepatic metastases are discernible but interval changes in size are difficult to evaluate in the absence of intravenous contrast       Unchanged periportal and left para-aortic nonenlarged and borderline lymph nodes, nonspecific  Workstation performed: [id number]         XR chest portable   Final Result by Jewell Odonnell DO (03/11 1121)      No acute cardiopulmonary disease  Workstation performed: [id number]         CT head without contrast   Final Result by Gloria Vincent MD (03/11 1037)      No acute intracranial abnormality  Workstation performed: [id number]                    Procedures  Procedures         ED Course  ED Course as of Mar 11 1922   Wed Mar 11, 2020   1239 Initial EKG interpreted by me 91 beats per minute sinus rhythm with PACs unifocal   Left axis deviation no ST elevation similar to previous             HEART Risk Score      Most Recent Value   Heart Score Risk Calculator   History  0 Filed at: 03/11/2020 1224   ECG  0 Filed at: 03/11/2020 1224   Age  2 Filed at: 03/11/2020 1224   Risk Factors  2 Filed at: 03/11/2020 1224   Troponin  0 Filed at: 03/11/2020 1224   HEART Score  4 Filed at: 03/11/2020 1224                  Initial Sepsis Screening     Row Name 03/11/20 1224                Is the patient's history suggestive of a new or worsening infection? (!) Yes (Proceed)  -DD        Suspected source of infection  suspect infection, source unknown  -DD        Are two or more of the following signs & symptoms of infection both present and new to the patient? (!) Yes (Proceed)  -DD        Indicate SIRS criteria  Leukocytosis (WBC > 51370 IJL); Hypothermia < 36C (96 8F)  -DD        If the answer is yes to both questions, suspicion of sepsis is present  --        If severe sepsis is present AND tissue hypoperfusion perists in the hour after fluid resuscitation or lactate > 4, the patient meets criteria for SEPTIC SHOCK  --        Are any of the following organ dysfunction criteria present within 6 hours of suspected infection and SIRS criteria that are NOT considered to be chronic conditions?   No  -DD        Organ dysfunction  --        Date of presentation of severe sepsis  --        Time of presentation of severe sepsis  --        Tissue hypoperfusion persists in the hour after crystalloid fluid administration, evidenced, by either:  --        Was hypotension present within one hour of the conclusion of crystalloid fluid administration?  --        Date of presentation of septic shock  --        Time of presentation of septic shock  --          User Key  (r) = Recorded By, (t) = Taken By, (c) = Cosigned By    [address] Name Provider Type    DD Fran Bejarano PA-C Physician Assistant                  MDM      Disposition  Final diagnoses:   Syncope   Acute on chronic renal failure ([facility] [address] )   UTI (urinary tract infection)   Hyperkalemia   C  difficile colitis     Time reflects when diagnosis was documented in both MDM as applicable and the Disposition within this note     Time User Action Codes Description Comment    3/11/2020 12:36 PM [place] M[place] Peterson Add [R55] Syncope     3/11/2020 12:36 PM Migue Diaz Add [N17 9,  N18 9] Acute on chronic renal failure ([facility] [address] )     3/11/2020 12:37 PM West Kristen, [address] [place] Add [N39 0] UTI (urinary tract infection)     3/11/2020 12:37 PM Migue Madrigal Add [E87 5] Hyperkalemia     3/11/2020 12:37 PM Migue Diaz Add [A04 72] C  difficile colitis     3/11/2020  1:57 PM Oc Rodriguez Add [D3A 8] Neuroendocrine tumor     3/11/2020  2:00 PM Raymon Barlow Add [R55] Syncope, unspecified syncope type       ED Disposition     ED Disposition Condition Date/Time Comment    Admit Stable Wed Mar 11, 2020 12:36 PM Case was discussed with Dr Betsy Langford and the patient's admission status was agreed to be Admission Status: inpatient status to the service of Dr Elina Tran           Follow-up Information    None         Current Discharge Medication List      CONTINUE these medications which have NOT CHANGED    Details   amLODIPine (NORVASC) 10 mg tablet Take 10 mg by mouth daily      aspirin 81 MG tablet Take 81 mg by mouth daily      b complex-vitamin C-folic acid (NEPHROCAPS) 1 mg capsule Take 1 capsule by mouth daily with dinner  Qty: 60 capsule, Refills: 0    Associated Diagnoses: Acute kidney injury superimposed on chronic kidney disease (HCC)      cholecalciferol (VITAMIN D3) 1,000 units tablet Take 1,000 Units by mouth daily      colchicine (COLCRYS) 0 6 mg tablet Take 0 5 tablets (0 3 mg total) by mouth daily  Qty: 15 tablet, Refills: 2    Associated Diagnoses: Gout, unspecified cause, unspecified chronicity, unspecified site      insulin detemir (LEVEMIR) 100 units/mL subcutaneous injection Inject 10 Units under the skin daily at bedtime      insulin lispro (HumaLOG) 100 units/mL injection Inject under the skin 3 (three) times a day before meals      magnesium oxide (MAG-OX) 400 mg Take 1 tablet (400 mg total) by mouth 2 (two) times a day  Qty: 60 tablet, Refills: 0    Associated Diagnoses: Acute hypokalemia      methenamine hippurate (HIPREX) 1 g tablet Take 1 tablet (1 g total) by mouth 3 (three) times a day  Qty: 90 tablet, Refills: 5    Associated Diagnoses: Urinary tract infection without hematuria, site unspecified      pantoprazole (PROTONIX) 40 mg tablet Take 1 tablet (40 mg total) by mouth daily in the early morning  Refills: 0    Associated Diagnoses: Nausea and vomiting      potassium chloride (K-DUR,KLOR-CON) 20 mEq tablet Take 1 tablet (20 mEq total) by mouth daily  Qty: 30 tablet, Refills: 0    Associated Diagnoses: Clostridium difficile diarrhea      simethicone (MYLICON) 80 mg chewable tablet Chew 1 tablet (80 mg total) every 6 (six) hours as needed for flatulence  Qty: 90 tablet, Refills: 5 Associated Diagnoses: Gas pain      tamsulosin (FLOMAX) 0 4 mg Take 1 capsule (0 4 mg total) by mouth daily with dinner  Refills: 0    Associated Diagnoses: Acute renal failure with other specified pathological kidney lesion superimposed on stage 3 chronic kidney disease (HCC)      vancomycin (VANCOCIN) 50mg/mL SOLN Take 2 5 mL (125 mg total) by mouth every 6 (six) hours for 10 days, THEN 2 5 mL (125 mg total) every 12 (twelve) hours for 7 days, THEN 2 5 mL (125 mg total) daily for 7 days, THEN 2 5 mL (125 mg total) every other day  Qty: 190 mL, Refills: 0    Associated Diagnoses: Clostridium difficile diarrhea      acetaminophen (TYLENOL) 325 mg tablet Take 650 mg by mouth every 6 (six) hours as needed       BD INSULIN SYRINGE U/F 30G X 1/2" 0 3 ML MISC       Insulin Syringe-Needle U-100 30G X 1/2" 1 ML MISC by Does not apply route 3 (three) times a day  Qty: 100 each, Refills: 5    Associated Diagnoses: Type 2 diabetes mellitus with hyperglycemia, with long-term current use of insulin ([facility])      oxyCODONE-acetaminophen (PERCOCET) 5-325 mg per tablet Take 1 tablet by mouth every 4 (four) hours as needed for moderate pain           No discharge procedures on file      PDMP Review       Value Time User    PDMP Reviewed  Yes 3/7/2020 12:10 PM Nida Angel, [facility] Provider  Electronically Signed by           Mira Prajapati PA-C  03/11/20 1922

## 2020-03-11 NOTE — ASSESSMENT & PLAN NOTE
· Episode in the [facility] while receiving lanreotide infusion  · Telemetry montioring  · Check troponin levels  · Check a transthoracic echocardiogram

## 2020-03-11 NOTE — H&P
H&P- Obinna Jordan 1940, 79 y o  male MRN: [id number]    Unit/Bed#: RM01 Encounter: [id number]    Primary Care Provider: Jessica Swain DO   Date and time admitted to hospital: 3/11/2020  9:12 AM        * Sepsis [facility])  Assessment & Plan  · Admit to med/surg level of care  · Sepsis was present on admission and secondary to possible acute cystitis associated with an indwelling Haile catheter versus reactivation of Clostridium difficile colitis  · SIRS criteria was met with hypothermia and a leukocytosis  · Utilize IV zosyn based on previous urine culture with ESBL Escherichia coli  · Also will utilize IV vancomycin  · Serial laboratory testing to monitor the patient's renal function while on the combination of IV zosyn and IV vancomycin  · Continue IV fluids with isolyte at 75 ml/hr  · Check blood cultures x 2 sets  · Check a urine culture  · The lactic acid level was within normal limits  · Check and follow the procalcitonin level  · Check stool cultures including a Clostridium difficile culture, a Rotavirus stool antigen test, a stool culture for enteric bacterial pathogens, and stool for fecal leukocytes  · Check a CT scan of the chest/abdomen/pelvis    I discussed goals of care with the patient, and the patient wishes to be a Level 1-Full Code      Syncope and collapse  Assessment & Plan  · Episode in the [facility] while receiving lanreotide infusion  · Telemetry montioring  · Check troponin levels  · Check a transthoracic echocardiogram    Increased anion gap metabolic acidosis  Assessment & Plan  · Check an ABG  · The lactic acid level was within normal limits    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  Assessment & Plan  · Recheck Clostridium difficile stool culture with the patient having sepsis  · Utilize vancomycin 125 mg PO every 6 hours    Clostridium difficile toxin by PCR with EIA [[id number]] (Abnormal) Collected: 03/03/20 2057   Lab Status: Final result Specimen: Stool from Per Rectum Updated: 03/04/20 1429    C difficile toxin by PCR PositiveAbnormal     Comment: Result suggests infection or colonization with C  difficile  EIA testing has been performed to clarify  Maintain strict contact and hand hygiene precautions  C difficile Toxins A+B, EIA Negative    Comment: Probable C  difficile colonization without active infection  Treatment recommended if severe diarrhea without alternate etiology  Maintain strict contact and hand hygiene precautions  This is an appended report  Clemente Duran results have been appended to a previously preliminary verified report  Elevated total protein  Assessment & Plan  · Likely secondary to volume depletion  · Follow the total protein level    Hyperkalemia  Assessment & Plan  · Hemolyzed specimen  · Recheck potassium level    Stage 4 chronic kidney disease (HCC)  Assessment & Plan  · Baseline serum creatinine of 1 9-2 4 mg/dl  · Avoid all nephrotoxic agents  · Serial laboratory testing to monitor the patient's renal function and electrolytes    Chronic indwelling Haile catheter  Assessment & Plan  · Recent urine culture with ESBL Escherichia coli UTI  · Associated with indwelling Haile catheter  · Utilize IV zosyn based on previous urine culture result  · Check a urine culture    Acute cystitis with hematuria  Assessment & Plan  · Recent urine culture with ESBL Escherichia coli UTI  · Associated with indwelling Haile catheter  · Utilize IV zosyn based on previous urine culture result  · Check a urine culture    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  · Receiving lanreotide infusions as an outpatient with Hematology/Oncology    Type 2 diabetes mellitus with hyperglycemia, with long-term current use of insulin (HCC)  Assessment & Plan  Lab Results   Component Value Date    HGBA1C 8 1 (H) 06/14/2019       No results for input(s): POCGLU in the last 72 hours      Blood Sugar Average: Last 72 hrs:     · Continue levemir 10 Units SQ QHS  · Check a HgbA1c level  · Hypoglycemia protocol  · Follow the blood glucose trend      VTE Prophylaxis: Heparin  / sequential compression device   Code Status: Level 1-Full Code    Anticipated Length of Stay:  Patient will be admitted on an Inpatient basis with an anticipated length of stay of greater than 2 midnights  Justification for Hospital Stay:  The patient requires IV antibiotic treatment and a work-up for the sepsis  Chief Complaint:  Syncope and generalized weakness    History of Present Illness:    Juancho Paredes is a 79 y o  male who presents to the emergency department after being a medical emergency alert in the [facility] at [address] while receiving an IV lanreotide infusion for treatment of his metastatic neuroendocrine tumor  The patient experienced a syncopal episode and was found to have hypotension  The patient has also been experiencing generalized weakness over the last few days  He is currently being treated for a Clostridium difficile infection with PO vancomycin  The symptoms were severe in intensity and constant in nature  Nothing seemed to improve the patient's symptoms  The patient was then immediately transported to the emergency department for further evaluation and was diagnosed with sepsis  Review of Systems:    Review of Systems:  Per HPI, all other systems have been reviewed and were negative      Past Medical and Surgical History:     Past Medical History:   Diagnosis Date    Acute pancreatitis without infection or necrosis 9/26/2019    Anemia of chronic renal failure     BPH (benign prostatic hyperplasia)     Cancer (HCC)     liver cancer    Cardiac disease     Chronic kidney disease, stage 3 (HCC)     Coronary artery disease     Diabetes mellitus ([facility] [address] )     DJD (degenerative joint disease)     Essential hypertension     Gait disturbance     Generalized weakness     GERD (gastroesophageal reflux disease)     Gout     History of transfusion     Hydronephrosis     Hyperlipidemia     Hypertension     Liver cancer ([facility] [address] )     Pressure injury of skin     Proteinuria     Renal disorder     Retention of urine     Thrombophlebitis migrans     Type 2 diabetes mellitus (HCC)     Type 2 diabetes 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, with long-term current use of insulin ([facility] [address] ) 1/23/2019       Past Surgical History:   Procedure Laterality Date    CHOLECYSTECTOMY      CHOLECYSTECTOMY LAPAROSCOPIC N/A 2/7/2020    Procedure: CHOLECYSTECTOMY LAPAROSCOPIC;  Surgeon: Sydney Eric MD;  Location: [address] [place] [address] MAIN OR;  Service: General    CORONARY ANGIOPLASTY WITH STENT PLACEMENT      IR IMAGE GUIDED BIOPSY/ASPIRATION LIVER  1/24/2019    IR TUBE PLACEMENT ROQUE  9/6/2019       Meds/Allergies:    Prior to Admission medications    Medication Sig Start Date End Date Taking?  Authorizing Provider   acetaminophen (TYLENOL) 325 mg tablet Take by mouth as needed    Historical Provider, MD   amLODIPine (NORVASC) 10 mg tablet Take 10 mg by mouth daily    Historical Provider, MD   aspirin 81 MG tablet Take 81 mg by mouth daily    Historical Provider, MD   b complex-vitamin C-folic acid (NEPHROCAPS) 1 mg capsule Take 1 capsule by mouth daily with dinner 2/28/20   Laura Madera MD   BD INSULIN SYRINGE U/F 30G X 1/2" 0 3 ML MISC  3/2/20   Historical Provider, MD   cholecalciferol (VITAMIN D3) 1,000 units tablet Take 1,000 Units by mouth daily    Historical Provider, MD   colchicine (COLCRYS) 0 6 mg tablet Take 0 5 tablets (0 3 mg total) by mouth daily 2/20/20 5/20/20  Sydney Eric MD   insulin detemir (LEVEMIR) 100 units/mL subcutaneous injection Inject 10 Units under the skin daily at bedtime    Historical Provider, MD   insulin lispro (HumaLOG) 100 units/mL injection Inject under the skin 3 (three) times a day before meals    Historical Provider, MD   Insulin Syringe-Needle U-100 30G X 1/2" 1 ML MISC by Does not apply route 3 (three) times a day 12/26/19   Paras Holman MD   magnesium oxide (MAG-OX) 400 mg Take 1 tablet (400 mg total) by mouth 2 (two) times a day 3/8/20 4/7/20  RIKA Harrison   methenamine hippurate (HIPREX) 1 g tablet Take 1 tablet (1 g total) by mouth 3 (three) times a day 2/28/20   Paras Holman MD   oxyCODONE-acetaminophen (PERCOCET) 5-325 mg per tablet Take 1 tablet by mouth every 4 (four) hours as needed for moderate pain    Historical Provider, MD   pantoprazole (PROTONIX) 40 mg tablet Take 1 tablet (40 mg total) by mouth daily in the early morning 2/8/19   Ronit Stephenson MD   potassium chloride (K-DUR,KLOR-CON) 20 mEq tablet Take 1 tablet (20 mEq total) by mouth daily 3/8/20 4/7/20  [place] RIKA Lundberg   simethicone (MYLICON) 80 mg chewable tablet Chew 1 tablet (80 mg total) every 6 (six) hours as needed for flatulence 11/18/19   Cisco Flanagan DO   tamsulosin (FLOMAX) 0 4 mg Take 1 capsule (0 4 mg total) by mouth daily with dinner 2/7/19   Ronit Stephenson MD   vancomycin (VANCOCIN) 50mg/mL SOLN Take 2 5 mL (125 mg total) by mouth every 6 (six) hours for 10 days, THEN 2 5 mL (125 mg total) every 12 (twelve) hours for 7 days, THEN 2 5 mL (125 mg total) daily for 7 days, THEN 2 5 mL (125 mg total) every other day  3/8/20 5/1/20  RIKA Harrison     I have reviewed home medications with patient personally  Allergies: No Known Allergies    Social History:     Marital Status:       Substance Use History:   Social History     Substance and Sexual Activity   Alcohol Use Never    Frequency: Never     Social History     Tobacco Use   Smoking Status Never Smoker   Smokeless Tobacco Never Used     Social History     Substance and Sexual Activity   Drug Use Never       Family History:    non-contributory    Physical Exam:     Vitals:   Blood Pressure: 164/78 (03/11/20 1300)  Pulse: 74 (03/11/20 1300)  Temperature: (!) 96 7 °F (35 9 °C) (03/11/20 0917)  Temp Source: Temporal (03/11/20 2778)  Respirations: 20 (03/11/20 1300)  Height: 5' 8" (172 7 cm) (03/11/20 0917)  Weight - Scale: 66 kg (145 lb 8 1 oz) (03/11/20 0917)  SpO2: 100 % (03/11/20 1300)    Physical Exam  General:  NAD, follows commands, ill-appearing  HEENT:  NC/AT, mucous membranes dry  Neck:  Supple, No JVP elevation  CV:  + S1, + S2, RRR  Pulm:  Lung fields are CTA bilaterally  Abd:  Soft, Non-tender, Non-distended  Ext:  No clubbing/cyanosis/edema  Skin:  No rashes  Neuro:  Awake, lethargic, oriented  Psych:  Normal mood and affect  :  Haile catheter in place      Additional Data:     Lab Results: I have personally reviewed pertinent reports  Results from last 7 days   Lab Units 03/11/20  1007   WBC Thousand/uL 16 74*   HEMOGLOBIN g/dL 11 2*   HEMATOCRIT % 35 1*   PLATELETS Thousands/uL 190   NEUTROS PCT % 86*   LYMPHS PCT % 5*   MONOS PCT % 8   EOS PCT % 0     Results from last 7 days   Lab Units 03/11/20  1007   SODIUM mmol/L 136   POTASSIUM mmol/L 5 7*   CHLORIDE mmol/L 105   CO2 mmol/L 16*   BUN mg/dL 53*   CREATININE mg/dL 2 69*   ANION GAP mmol/L 15*   CALCIUM mg/dL 9 8   ALBUMIN g/dL 3 2*   TOTAL BILIRUBIN mg/dL 0 60   ALK PHOS U/L 156*   ALT U/L 25   AST U/L 23   GLUCOSE RANDOM mg/dL 248*         Results from last 7 days   Lab Units 03/08/20  1111 03/08/20  0538 03/07/20  2017 03/07/20  1611 03/07/20  1100 03/07/20  0556 03/06/20  2115 03/06/20  1628 03/06/20  1109 03/06/20  0722 03/06/20  0616 03/05/20  1624   POC GLUCOSE mg/dl 197* 126 243* 164* 170* 65 196* 207* 212* 118 47* 193*         Results from last 7 days   Lab Units 03/11/20  1139   LACTIC ACID mmol/L 1 3       Imaging: I have personally reviewed pertinent reports  XR chest portable   Final Result by Jessica Elizalde DO (03/11 1121)      No acute cardiopulmonary disease  Workstation performed: [id number]         CT head without contrast   Final Result by Mayito Sarabia MD (03/11 1037)      No acute intracranial abnormality  Workstation performed: [id number]         CT chest abdomen pelvis wo contrast    (Results Pending)       Allscripts / Epic Records Reviewed: Yes     ** Please Note: This note has been constructed using a voice recognition system   **

## 2020-03-11 NOTE — PROGRESS NOTES
Vancomycin Assessment    Jerry Rosenbaum is a 79 y o  male who is currently receiving vancomycin 750mg q24h for other sepsis   Relevant clinical data and objective history reviewed:  Creatinine   Date Value Ref Range Status   03/11/2020 2 69 (H) 0 60 - 1 30 mg/dL Final     Comment:     Standardized to IDMS reference method   03/07/2020 1 95 (H) 0 70 - 1 30 mg/dL Final     Comment:     Standardized to IDMS reference method   03/05/2020 2 16 (H) 0 70 - 1 30 mg/dL Final     Comment:     Standardized to IDMS reference method     BP (!) 171/81 (BP Location: Left arm)   Pulse 79   Temp (!) 96 7 °F (35 9 °C) (Temporal)   Resp 20   Ht 5' 8" (1 727 m)   Wt 66 kg (145 lb 8 1 oz)   SpO2 100%   BMI 22 12 kg/m²   No intake/output data recorded  Lab Results   Component Value Date/Time    BUN 53 (H) 03/11/2020 10:07 AM    WBC 16 74 (H) 03/11/2020 10:07 AM    HGB 11 2 (L) 03/11/2020 10:07 AM    HCT 35 1 (L) 03/11/2020 10:07 AM    MCV 96 03/11/2020 10:07 AM     03/11/2020 10:07 AM     Temp Readings from Last 3 Encounters:   03/11/20 (!) 96 7 °F (35 9 °C) (Temporal)   03/11/20 97 8 °F (36 6 °C) (Tympanic)   03/08/20 (!) 97 4 °F (36 3 °C) (Temporal)     Vancomycin Days of Therapy: 1    Assessment/Plan  The patient is currently on vancomycin utilizing scheduled dosing based on actual body weight  Baseline risks associated with therapy include: pre-existing renal impairment, advanced age and dehydration  The patient is currently receiving 750mg q24h and is clinically appropriate and dose will be continued  Pharmacy will also follow closely for s/sx of nephrotoxicity, infusion reactions and appropriateness of therapy  BMP and CBC will be ordered per protocol  Plan for trough as patient approaches steady state, prior to the 4th  dose at approximately 1430 on 3/14/20  Due to infection severity, will target a trough of 15-20 (appropriate for most indications)     Pharmacy will continue to follow the patients culture results and clinical progress daily      Gracie Wiley, Pharmacist

## 2020-03-11 NOTE — ASSESSMENT & PLAN NOTE
· Admit to med/surg level of care  · Sepsis was present on admission and secondary to possible acute cystitis associated with an indwelling Haile catheter versus reactivation of Clostridium difficile colitis  · SIRS criteria was met with hypothermia and a leukocytosis  · Utilize IV zosyn based on previous urine culture with ESBL Escherichia coli  · Also will utilize IV vancomycin  · Serial laboratory testing to monitor the patient's renal function while on the combination of IV zosyn and IV vancomycin  · Continue IV fluids with isolyte at 75 ml/hr  · Check blood cultures x 2 sets  · Check a urine culture  · The lactic acid level was within normal limits  · Check and follow the procalcitonin level  · Check stool cultures including a Clostridium difficile culture, a Rotavirus stool antigen test, a stool culture for enteric bacterial pathogens, and stool for fecal leukocytes  · Check a CT scan of the chest/abdomen/pelvis    I discussed goals of care with the patient, and the patient wishes to be a Level 1-Full Code

## 2020-03-11 NOTE — ASSESSMENT & PLAN NOTE
· Baseline serum creatinine of 1 9-2 4 mg/dl  · Avoid all nephrotoxic agents  · Serial laboratory testing to monitor the patient's renal function and electrolytes

## 2020-03-11 NOTE — PLAN OF CARE
Problem: Potential for Falls  Goal: Patient will remain free of falls  Description  INTERVENTIONS:  - Assess patient frequently for physical needs  -  Identify cognitive and physical deficits and behaviors that affect risk of falls  -  Reserve fall precautions as indicated by assessment   - Educate patient/family on patient safety including physical limitations  - Instruct patient to call for assistance with activity based on assessment  - Modify environment to reduce risk of injury  - Consider OT/PT consult to assist with strengthening/mobility  Outcome: Progressing     Problem: Prexisting or High Potential for Compromised Skin Integrity  Goal: Skin integrity is maintained or improved  Description  INTERVENTIONS:  - Identify patients at risk for skin breakdown  - Assess and monitor skin integrity  - Assess and monitor nutrition and hydration status  - Monitor labs   - Assess for incontinence   - Turn and reposition patient  - Assist with mobility/ambulation  - Relieve pressure over bony prominences  - Avoid friction and shearing  - Provide appropriate hygiene as needed including keeping skin clean and dry  - Evaluate need for skin moisturizer/barrier cream  - Collaborate with interdisciplinary team   - Patient/family teaching  - Consider wound care consult   Outcome: Progressing     Problem: Nutrition/Hydration-ADULT  Goal: Nutrient/Hydration intake appropriate for improving, restoring or maintaining nutritional needs  Description  Monitor and assess patient's nutrition/hydration status for malnutrition  Collaborate with interdisciplinary team and initiate plan and interventions as ordered  Monitor patient's weight and dietary intake as ordered or per policy  Utilize nutrition screening tool and intervene as necessary  Determine patient's food preferences and provide high-protein, high-caloric foods as appropriate       INTERVENTIONS:  - Monitor oral intake, urinary output, labs, and treatment plans  - Assess nutrition and hydration status and recommend course of action  - Evaluate amount of meals eaten  - Assist patient with eating if necessary   - Allow adequate time for meals  - Recommend/ encourage appropriate diets, oral nutritional supplements, and vitamin/mineral supplements  - Order, calculate, and assess calorie counts as needed  - Recommend, monitor, and adjust tube feedings and TPN/PPN based on assessed needs  - Assess need for intravenous fluids  - Provide specific nutrition/hydration education as appropriate  - Include patient/family/caregiver in decisions related to nutrition  Outcome: Progressing

## 2020-03-11 NOTE — ASSESSMENT & PLAN NOTE
· Recent urine culture with ESBL Escherichia coli UTI  · Associated with indwelling Haile catheter  · Utilize IV zosyn based on previous urine culture result  · Check a urine culture

## 2020-03-11 NOTE — PROGRESS NOTES
7051 Patient received shot of lanreotide  0901 patient became dizzy per his wife, patient was unable to speak, Patient was sitting on his walker nurse was holding him up, patient's lips changing to blue, o2 applied  Patient was transferred into a [facility]  Medical emergency called    Dr Raphael Weeks, Dr Luciano Head arrived, patient taken to the ED by Lizbeth Olivas RN, and medical emergency staff For further evauation

## 2020-03-11 NOTE — PROGRESS NOTES
Called and spoke to Hiwot Silva RN regarding patients recent discharge from the hosptial with CDiff, per Ivon Gaytan we are okay to proceed with treatment today

## 2020-03-11 NOTE — ASSESSMENT & PLAN NOTE
· Recheck Clostridium difficile stool culture with the patient having sepsis  · Utilize vancomycin 125 mg PO every 6 hours    Clostridium difficile toxin by PCR with EIA [[id number]] (Abnormal) Collected: 03/03/20 2057   Lab Status: Final result Specimen: Stool from Per Rectum Updated: 03/04/20 1429    C difficile toxin by PCR PositiveAbnormal     Comment: Result suggests infection or colonization with C  difficile  EIA testing has been performed to clarify  Maintain strict contact and hand hygiene precautions  C difficile Toxins A+B, EIA Negative    Comment: Probable C  difficile colonization without active infection  Treatment recommended if severe diarrhea without alternate etiology  Maintain strict contact and hand hygiene precautions  This is an appended report  Debbe Expose results have been appended to a previously preliminary verified report

## 2020-03-11 NOTE — ASSESSMENT & PLAN NOTE
Lab Results   Component Value Date    HGBA1C 8 1 (H) 06/14/2019       No results for input(s): POCGLU in the last 72 hours      Blood Sugar Average: Last 72 hrs:     · Continue levemir 10 Units SQ QHS  · Check a HgbA1c level  · Hypoglycemia protocol  · Follow the blood glucose trend

## 2020-03-11 NOTE — CASE MANAGEMENT
I self referred the patient do to his recent discharge from [address] [place] [address] on 3/8/2020  The patients girlfriend picked up his discharge medication  The patient was at the infusion center today and had a sycope episode  I discussed resources with the patients girlfriend  The patient was sleeping  The patient's girlfriend denied needing help with resources at this time  The patient lives with his girlfriend in a one story house  There is 2 BETH  He has a walker, rolling walker, and WC  [facility] has [facility]  He needs help with his ADL's  He does not drive his girlfriend drives him  He uses [facility] in [place]  His girlfriend picks up his medications for him  He has [facility]

## 2020-03-11 NOTE — ASSESSMENT & PLAN NOTE
· 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  · Receiving lanreotide infusions as an outpatient with Hematology/Oncology

## 2020-03-12 ENCOUNTER — APPOINTMENT (INPATIENT)
Dept: NON INVASIVE DIAGNOSTICS | Facility: HOSPITAL | Age: 80
DRG: 698 | End: 2020-03-12
Payer: COMMERCIAL

## 2020-03-12 PROBLEM — R79.89 LOW TSH LEVEL: Status: ACTIVE | Noted: 2020-03-12

## 2020-03-12 LAB
ALBUMIN SERPL BCP-MCNC: 2.4 G/DL (ref 3.5–5)
ALP SERPL-CCNC: 119 U/L (ref 46–116)
ALT SERPL W P-5'-P-CCNC: 18 U/L (ref 12–78)
ANION GAP SERPL CALCULATED.3IONS-SCNC: 12 MMOL/L (ref 4–13)
AST SERPL W P-5'-P-CCNC: 13 U/L (ref 5–45)
ATRIAL RATE: 91 BPM
BASOPHILS # BLD AUTO: 0.06 THOUSANDS/ΜL (ref 0–0.1)
BASOPHILS NFR BLD AUTO: 1 % (ref 0–1)
BILIRUB SERPL-MCNC: 0.4 MG/DL (ref 0.2–1)
BUN SERPL-MCNC: 46 MG/DL (ref 5–25)
C DIFF TOX GENS STL QL NAA+PROBE: NEGATIVE
CALCIUM SERPL-MCNC: 9.3 MG/DL (ref 8.3–10.1)
CAMPYLOBACTER DNA SPEC NAA+PROBE: NORMAL
CHLORIDE SERPL-SCNC: 107 MMOL/L (ref 100–108)
CK SERPL-CCNC: 50 U/L (ref 39–308)
CO2 SERPL-SCNC: 19 MMOL/L (ref 21–32)
CREAT SERPL-MCNC: 2.5 MG/DL (ref 0.6–1.3)
EOSINOPHIL # BLD AUTO: 0.06 THOUSAND/ΜL (ref 0–0.61)
EOSINOPHIL NFR BLD AUTO: 1 % (ref 0–6)
ERYTHROCYTE [DISTWIDTH] IN BLOOD BY AUTOMATED COUNT: 15.7 % (ref 11.6–15.1)
EST. AVERAGE GLUCOSE BLD GHB EST-MCNC: 143 MG/DL
GFR SERPL CREATININE-BSD FRML MDRD: 23 ML/MIN/1.73SQ M
GLUCOSE SERPL-MCNC: 117 MG/DL (ref 65–140)
GLUCOSE SERPL-MCNC: 234 MG/DL (ref 65–140)
GLUCOSE SERPL-MCNC: 246 MG/DL (ref 65–140)
GLUCOSE SERPL-MCNC: 285 MG/DL (ref 65–140)
GLUCOSE SERPL-MCNC: 98 MG/DL (ref 65–140)
HAV IGM SER QL: NORMAL
HBA1C MFR BLD: 6.6 %
HBV CORE IGM SER QL: NORMAL
HBV SURFACE AG SER QL: NORMAL
HCT VFR BLD AUTO: 28.1 % (ref 36.5–49.3)
HCV AB SER QL: NORMAL
HGB BLD-MCNC: 9 G/DL (ref 12–17)
IMM GRANULOCYTES # BLD AUTO: 0.05 THOUSAND/UL (ref 0–0.2)
IMM GRANULOCYTES NFR BLD AUTO: 1 % (ref 0–2)
LACTATE SERPL-SCNC: 1.2 MMOL/L (ref 0.5–2)
LYMPHOCYTES # BLD AUTO: 1.18 THOUSANDS/ΜL (ref 0.6–4.47)
LYMPHOCYTES NFR BLD AUTO: 13 % (ref 14–44)
MAGNESIUM SERPL-MCNC: 1.9 MG/DL (ref 1.6–2.6)
MCH RBC QN AUTO: 30.6 PG (ref 26.8–34.3)
MCHC RBC AUTO-ENTMCNC: 32 G/DL (ref 31.4–37.4)
MCV RBC AUTO: 96 FL (ref 82–98)
MONOCYTES # BLD AUTO: 0.94 THOUSAND/ΜL (ref 0.17–1.22)
MONOCYTES NFR BLD AUTO: 10 % (ref 4–12)
NEUTROPHILS # BLD AUTO: 6.93 THOUSANDS/ΜL (ref 1.85–7.62)
NEUTS SEG NFR BLD AUTO: 74 % (ref 43–75)
NRBC BLD AUTO-RTO: 0 /100 WBCS
PHOSPHATE SERPL-MCNC: 4.7 MG/DL (ref 2.3–4.1)
PLATELET # BLD AUTO: 174 THOUSANDS/UL (ref 149–390)
PMV BLD AUTO: 11 FL (ref 8.9–12.7)
POTASSIUM SERPL-SCNC: 4.5 MMOL/L (ref 3.5–5.3)
PR INTERVAL: 156 MS
PROCALCITONIN SERPL-MCNC: 1.23 NG/ML
PROT SERPL-MCNC: 6.7 G/DL (ref 6.4–8.2)
QRS AXIS: -56 DEGREES
QRSD INTERVAL: 98 MS
QT INTERVAL: 360 MS
QTC INTERVAL: 442 MS
RBC # BLD AUTO: 2.94 MILLION/UL (ref 3.88–5.62)
RV AG STL QL: NEGATIVE
SALMONELLA DNA SPEC QL NAA+PROBE: NORMAL
SHIGA TOXIN STX GENE SPEC NAA+PROBE: NORMAL
SHIGELLA DNA SPEC QL NAA+PROBE: NORMAL
SODIUM SERPL-SCNC: 138 MMOL/L (ref 136–145)
T WAVE AXIS: 100 DEGREES
TROPONIN I SERPL-MCNC: <0.02 NG/ML
TSH SERPL DL<=0.05 MIU/L-ACNC: 0.2 UIU/ML (ref 0.36–3.74)
VENTRICULAR RATE: 91 BPM
WBC # BLD AUTO: 9.22 THOUSAND/UL (ref 4.31–10.16)

## 2020-03-12 PROCEDURE — 84484 ASSAY OF TROPONIN QUANT: CPT | Performed by: INTERNAL MEDICINE

## 2020-03-12 PROCEDURE — 80074 ACUTE HEPATITIS PANEL: CPT | Performed by: INTERNAL MEDICINE

## 2020-03-12 PROCEDURE — 83605 ASSAY OF LACTIC ACID: CPT | Performed by: INTERNAL MEDICINE

## 2020-03-12 PROCEDURE — 82948 REAGENT STRIP/BLOOD GLUCOSE: CPT

## 2020-03-12 PROCEDURE — 99232 SBSQ HOSP IP/OBS MODERATE 35: CPT | Performed by: INTERNAL MEDICINE

## 2020-03-12 PROCEDURE — 83036 HEMOGLOBIN GLYCOSYLATED A1C: CPT | Performed by: INTERNAL MEDICINE

## 2020-03-12 PROCEDURE — 85025 COMPLETE CBC W/AUTO DIFF WBC: CPT | Performed by: INTERNAL MEDICINE

## 2020-03-12 PROCEDURE — 84145 PROCALCITONIN (PCT): CPT | Performed by: INTERNAL MEDICINE

## 2020-03-12 PROCEDURE — 93306 TTE W/DOPPLER COMPLETE: CPT

## 2020-03-12 PROCEDURE — 84165 PROTEIN E-PHORESIS SERUM: CPT | Performed by: INTERNAL MEDICINE

## 2020-03-12 PROCEDURE — 82550 ASSAY OF CK (CPK): CPT | Performed by: INTERNAL MEDICINE

## 2020-03-12 PROCEDURE — 97535 SELF CARE MNGMENT TRAINING: CPT

## 2020-03-12 PROCEDURE — 84166 PROTEIN E-PHORESIS/URINE/CSF: CPT | Performed by: PATHOLOGY

## 2020-03-12 PROCEDURE — 84443 ASSAY THYROID STIM HORMONE: CPT | Performed by: INTERNAL MEDICINE

## 2020-03-12 PROCEDURE — 84100 ASSAY OF PHOSPHORUS: CPT | Performed by: INTERNAL MEDICINE

## 2020-03-12 PROCEDURE — 97167 OT EVAL HIGH COMPLEX 60 MIN: CPT

## 2020-03-12 PROCEDURE — 83735 ASSAY OF MAGNESIUM: CPT | Performed by: INTERNAL MEDICINE

## 2020-03-12 PROCEDURE — 93010 ELECTROCARDIOGRAM REPORT: CPT | Performed by: INTERNAL MEDICINE

## 2020-03-12 PROCEDURE — 84166 PROTEIN E-PHORESIS/URINE/CSF: CPT | Performed by: INTERNAL MEDICINE

## 2020-03-12 PROCEDURE — 84165 PROTEIN E-PHORESIS SERUM: CPT | Performed by: PATHOLOGY

## 2020-03-12 PROCEDURE — 93306 TTE W/DOPPLER COMPLETE: CPT | Performed by: INTERNAL MEDICINE

## 2020-03-12 PROCEDURE — 87389 HIV-1 AG W/HIV-1&-2 AB AG IA: CPT | Performed by: INTERNAL MEDICINE

## 2020-03-12 PROCEDURE — 80053 COMPREHEN METABOLIC PANEL: CPT | Performed by: INTERNAL MEDICINE

## 2020-03-12 PROCEDURE — 97163 PT EVAL HIGH COMPLEX 45 MIN: CPT

## 2020-03-12 RX ORDER — ACETAMINOPHEN 325 MG/1
650 TABLET ORAL EVERY 6 HOURS PRN
Status: DISCONTINUED | OUTPATIENT
Start: 2020-03-12 | End: 2020-03-19 | Stop reason: HOSPADM

## 2020-03-12 RX ADMIN — Medication 1 CAPSULE: at 16:57

## 2020-03-12 RX ADMIN — VANCOMYCIN HYDROCHLORIDE 125 MG: 500 INJECTION, POWDER, LYOPHILIZED, FOR SOLUTION INTRAVENOUS at 17:00

## 2020-03-12 RX ADMIN — HEPARIN SODIUM 5000 UNITS: 5000 INJECTION INTRAVENOUS; SUBCUTANEOUS at 14:35

## 2020-03-12 RX ADMIN — VANCOMYCIN HYDROCHLORIDE 125 MG: 500 INJECTION, POWDER, LYOPHILIZED, FOR SOLUTION INTRAVENOUS at 12:15

## 2020-03-12 RX ADMIN — INSULIN LISPRO 2 UNITS: 100 INJECTION, SOLUTION INTRAVENOUS; SUBCUTANEOUS at 17:00

## 2020-03-12 RX ADMIN — VANCOMYCIN HYDROCHLORIDE 125 MG: 500 INJECTION, POWDER, LYOPHILIZED, FOR SOLUTION INTRAVENOUS at 05:31

## 2020-03-12 RX ADMIN — PIPERACILLIN AND TAZOBACTAM 2.25 G: 2; .25 INJECTION, POWDER, FOR SOLUTION INTRAVENOUS at 00:21

## 2020-03-12 RX ADMIN — VANCOMYCIN HYDROCHLORIDE 750 MG: 750 INJECTION, POWDER, LYOPHILIZED, FOR SOLUTION INTRAVENOUS at 14:36

## 2020-03-12 RX ADMIN — VITAMIN D, TAB 1000IU (100/BT) 1000 UNITS: 25 TAB at 09:57

## 2020-03-12 RX ADMIN — ASPIRIN 81 MG 81 MG: 81 TABLET ORAL at 09:57

## 2020-03-12 RX ADMIN — INSULIN LISPRO 2 UNITS: 100 INJECTION, SOLUTION INTRAVENOUS; SUBCUTANEOUS at 22:06

## 2020-03-12 RX ADMIN — PIPERACILLIN AND TAZOBACTAM 2.25 G: 2; .25 INJECTION, POWDER, FOR SOLUTION INTRAVENOUS at 09:57

## 2020-03-12 RX ADMIN — HEPARIN SODIUM 5000 UNITS: 5000 INJECTION INTRAVENOUS; SUBCUTANEOUS at 05:31

## 2020-03-12 RX ADMIN — HEPARIN SODIUM 5000 UNITS: 5000 INJECTION INTRAVENOUS; SUBCUTANEOUS at 22:07

## 2020-03-12 RX ADMIN — PIPERACILLIN AND TAZOBACTAM 2.25 G: 2; .25 INJECTION, POWDER, FOR SOLUTION INTRAVENOUS at 16:57

## 2020-03-12 RX ADMIN — INSULIN DETEMIR 15 UNITS: 100 INJECTION, SOLUTION SUBCUTANEOUS at 22:05

## 2020-03-12 RX ADMIN — SODIUM CHLORIDE, SODIUM GLUCONATE, SODIUM ACETATE, POTASSIUM CHLORIDE, MAGNESIUM CHLORIDE, SODIUM PHOSPHATE, DIBASIC, AND POTASSIUM PHOSPHATE 75 ML/HR: .53; .5; .37; .037; .03; .012; .00082 INJECTION, SOLUTION INTRAVENOUS at 19:37

## 2020-03-12 RX ADMIN — VANCOMYCIN HYDROCHLORIDE 125 MG: 500 INJECTION, POWDER, LYOPHILIZED, FOR SOLUTION INTRAVENOUS at 00:21

## 2020-03-12 RX ADMIN — INSULIN LISPRO 3 UNITS: 100 INJECTION, SOLUTION INTRAVENOUS; SUBCUTANEOUS at 12:15

## 2020-03-12 RX ADMIN — SODIUM CHLORIDE, SODIUM GLUCONATE, SODIUM ACETATE, POTASSIUM CHLORIDE, MAGNESIUM CHLORIDE, SODIUM PHOSPHATE, DIBASIC, AND POTASSIUM PHOSPHATE 75 ML/HR: .53; .5; .37; .037; .03; .012; .00082 INJECTION, SOLUTION INTRAVENOUS at 05:31

## 2020-03-12 NOTE — PLAN OF CARE
Problem: Potential for Falls  Goal: Patient will remain free of falls  Description  INTERVENTIONS:  - Assess patient frequently for physical needs  -  Identify cognitive and physical deficits and behaviors that affect risk of falls  (fatigue, weakness)  -  [place] fall precautions as indicated by assessment  (high fall risk)  - Educate patient/family on patient safety including physical limitations  - Instruct patient to call for assistance with activity based on assessment  - Modify environment to reduce risk of injury  - Consider OT/PT consult to assist with strengthening/mobility    Outcome: Progressing     Problem: Prexisting or High Potential for Compromised Skin Integrity  Goal: Skin integrity is maintained or improved  Description  INTERVENTIONS:  - Identify patients at risk for skin breakdown  - Assess and monitor skin integrity  - Assess and monitor nutrition and hydration status  - Monitor labs   - Assess for incontinence   - Turn and reposition patient (every 2 hours and prn)  - Assist with mobility/ambulation (mod to max assist and walker)  - Relieve pressure over bony prominences  - Avoid friction and shearing  - Provide appropriate hygiene as needed including keeping skin clean and dry  - Evaluate need for skin moisturizer/barrier cream  - Collaborate with interdisciplinary team   - Patient/family teaching  - Consider wound care consult    Outcome: Progressing     Problem: Nutrition/Hydration-ADULT  Goal: Nutrient/Hydration intake appropriate for improving, restoring or maintaining nutritional needs  Description  Monitor and assess patient's nutrition/hydration status for malnutrition  Collaborate with interdisciplinary team and initiate plan and interventions as ordered  Monitor patient's weight and dietary intake as ordered or per policy  Utilize nutrition screening tool and intervene as necessary  Determine patient's food preferences and provide high-protein, high-caloric foods as appropriate  INTERVENTIONS:  - Monitor oral intake, urinary output, labs, and treatment plans  - Assess nutrition and hydration status and recommend course of action  - Evaluate amount of meals eaten  - Assist patient with eating if necessary   - Allow adequate time for meals  - Recommend/ encourage appropriate diets, oral nutritional supplements, and vitamin/mineral supplements  - Order, calculate, and assess calorie counts as needed  - Assess need for intravenous fluids  - Provide specific nutrition/hydration education as appropriate  - Include patient/family/caregiver in decisions related to nutrition   Outcome: Progressing     Problem: DISCHARGE PLANNING - CARE MANAGEMENT  Goal: Discharge to post-acute care or home with appropriate resources  Description  INTERVENTIONS:  - Conduct assessment to determine patient/family and health care team treatment goals, and need for post-acute services based on payer coverage, community resources, and patient preferences, and barriers to discharge  - Address psychosocial, clinical, and financial barriers to discharge as identified in assessment in conjunction with the patient/family and health care team  - Arrange appropriate level of post-acute services according to patient's   needs and preference and payer coverage in collaboration with the physician and health care team  - Communicate with and update the patient/family, physician, and health care team regarding progress on the discharge plan  - Arrange appropriate transportation to post-acute venues  -? STR vs home with homecare/home PT and OP follow up    Outcome: Progressing     Problem: PAIN - ADULT  Goal: Verbalizes/displays adequate comfort level or baseline comfort level  Description  Interventions:  - Encourage patient to monitor pain and request assistance  - Assess pain using appropriate pain scale (0-10 pain scale)  - Administer analgesics based on type and severity of pain and evaluate response  - Implement non-pharmacological measures as appropriate and evaluate response  - Consider cultural and social influences on pain and pain management  - Notify physician/advanced practitioner if interventions unsuccessful or patient reports new pain   Outcome: Progressing     Problem: INFECTION - ADULT  Goal: Absence or prevention of progression during hospitalization  Description  INTERVENTIONS:  - Assess and monitor for signs and symptoms of infection  - Monitor lab/diagnostic results  - Monitor all insertion sites, i e  indwelling lines  - Administer medications as ordered  - Instruct and encourage patient and family to use good hand hygiene technique  - Identify and instruct in appropriate isolation precautions for identified infection/condition (contact precautions)   Outcome: Progressing     Problem: SAFETY ADULT  Goal: Maintain or return to baseline ADL function  Description  INTERVENTIONS:  -  Assess patient's ability to carry out ADLs; (dependent for cares)  - Assess/evaluate cause of self-care deficits (fatigue, weakness)  - Assess range of motion  - Assess patient's mobility; (mod to max assist and walker)  - Assess patient's need for assistive devices and provide as appropriate (walker)  - Encourage maximum independence but intervene and supervise when necessary  - Involve family in performance of ADLs  - Assess for home care needs following discharge   - Consider OT consult to assist with ADL evaluation and planning for discharge  - Provide patient education as appropriate   Outcome: Progressing  Goal: Maintain or return mobility status to optimal level  Description  INTERVENTIONS:  - Assess patient's baseline mobility status (walker and assistance from spouse)    - Identify cognitive and physical deficits and behaviors that affect mobility (fatigue, weakness)  - Identify mobility aids required to assist with transfers and/or ambulation (gait belt, walker)  - [place] fall precautions as indicated by assessment (high fall risk)  - Record patient progress and toleration of activity level on Mobility SBAR; progress patient to next Phase/Stage  - Instruct patient to call for assistance with activity based on assessment  - Consider rehabilitation consult to assist with strengthening/weightbearing, etc    Outcome: Progressing     Problem: DISCHARGE PLANNING  Goal: Discharge to home or other facility with appropriate resources  Description  INTERVENTIONS:  - Identify barriers to discharge w/patient and caregiver  - Arrange for needed discharge resources and transportation as appropriate  - Identify discharge learning needs (meds, wound care, etc )  - Refer to Case Management Department for coordinating discharge planning if the patient needs post-hospital services based on physician/advanced practitioner order or complex needs related to functional status, cognitive ability, or social support system   Outcome: Progressing     Problem: Knowledge Deficit  Goal: Patient/family/caregiver demonstrates understanding of disease process, treatment plan, medications, and discharge instructions  Description  Complete learning assessment and assess knowledge base    Interventions:  - Provide teaching at level of understanding  - Provide teaching via preferred learning methods  Outcome: Progressing     Problem: GENITOURINARY - ADULT  Goal: Urinary catheter remains patent  Description  INTERVENTIONS:  - Assess patency of urinary catheter  - If patient has a chronic garcia, consider changing catheter if non-functioning (garcia is functioning)  - Follow guidelines for intermittent irrigation of non-functioning urinary catheter   Outcome: Progressing     Problem: METABOLIC, FLUID AND ELECTROLYTES - ADULT  Goal: Electrolytes maintained within normal limits  Description  INTERVENTIONS:  - Monitor labs and assess patient for signs and symptoms of electrolyte imbalances  - Administer electrolyte replacement as ordered  - Monitor response to electrolyte replacements, including repeat lab results as appropriate  - Instruct patient on fluid and nutrition as appropriate  Outcome: Progressing  Goal: Fluid balance maintained  Description  INTERVENTIONS:  - Monitor labs   - Monitor I/O and WT  - Instruct patient on fluid and nutrition as appropriate  - Assess for signs & symptoms of volume excess or deficit  Outcome: Progressing  Goal: Glucose maintained within target range  Description  INTERVENTIONS:  - Monitor Blood Glucose as ordered  - Assess for signs and symptoms of hyperglycemia and hypoglycemia  - Administer ordered medications to maintain glucose within target range  - Assess nutritional intake and initiate nutrition service referral as needed  Outcome: Progressing

## 2020-03-12 NOTE — PHYSICAL THERAPY NOTE
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    Increased anion gap metabolic acidosis       Past Medical History  Past Medical History:   Diagnosis Date    Acute pancreatitis without infection or necrosis 9/26/2019    Anemia of chronic renal failure     BPH (benign prostatic hyperplasia)     Cancer (HCC)     liver cancer    Cardiac disease     Chronic kidney disease, stage 3 ([facility] )     Coronary artery disease     [facility]     DJD (degenerative joint disease)     Essential hypertension     Gait disturbance     Generalized weakness     GERD (gastroesophageal reflux disease)     Gout     History of transfusion     Hydronephrosis     Hyperlipidemia     Hypertension     Liver cancer ([facility] )     Pressure injury of skin     Proteinuria     Renal disorder     Retention of urine     Thrombophlebitis migrans     Type 2 diabetes mellitus [facility]     Type 2 diabetes 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, with long-term current use of insulin ([facility] ) 1/23/2019        Past Surgical History  Past Surgical History:   Procedure Laterality Date    CHOLECYSTECTOMY      CHOLECYSTECTOMY LAPAROSCOPIC N/A 2/7/2020    Procedure: CHOLECYSTECTOMY LAPAROSCOPIC;  Surgeon:  Twan Ledezma MD;  Location: [address] [place] [address] OR;  Service: General    CORONARY ANGIOPLASTY WITH STENT PLACEMENT      IR IMAGE GUIDED BIOPSY/ASPIRATION LIVER  1/24/2019    IR TUBE PLACEMENT ROQUE  9/6/2019 03/12/20 0941   Note Type   Note type Eval/Treat   Pain Assessment   Pain Assessment Tool Pain Assessment not indicated - pt denies pain   Pain Score No Pain   Home Living   Type of [address] One level;Stairs to enter with rails  (2 BETH)   Bathroom Shower/Tub Tub/shower unit   Bathroom Toilet Standard   Bathroom Equipment Shower chair   [address] Walker   Prior Function   Level of [place] Needs assistance with IADLs; Needs assistance with ADLs and functional mobility   Lives With Significant other   Receives Help From Family   ADL Assistance Needs assistance   IADLs Needs assistance   Falls in the last 6 months 1 to 4   Vocational Retired   Restrictions/Precautions   [facility] Bearing Precautions Per Order No   Other Precautions Fall Risk;O2;Multiple lines;Telemetry; Bed Alarm   General   Family/Caregiver Present No   Cognition   Overall Cognitive Status WFL   Orientation Level Oriented X4   Following Commands Follows one step commands without difficulty   Bed Mobility   Supine to Sit 3  Moderate assistance   Additional items Assist x 1; Increased time required;Verbal cues;LE management   Transfers   Sit to Stand 3  Moderate assistance   Additional items Assist x 2; Increased time required;Verbal cues   Stand to Sit 3  Moderate assistance   Additional items Assist x 2; Increased time required;Verbal cues   Toilet transfer 4  Minimal assistance   Additional items Assist x 1;Standard toilet   Ambulation/Elevation   Gait pattern Excessively slow; Short stride; Foward flexed;Decreased foot clearance   Gait Assistance 4  Minimal assist   Additional items Assist x 1;Verbal cues   Assistive Device Rolling walker   Distance 10'   Balance   Static Sitting Fair +   Dynamic Sitting Fair +   Static Standing Fair -   Dynamic Standing [address] -  (with RW)   Endurance Deficit   Endurance Deficit Yes   Endurance Deficit Description pt extremely fatigued with minimal exertion   Activity Tolerance Activity Tolerance Patient limited by fatigue   Assessment   Prognosis Guarded   Problem List Decreased strength;Decreased endurance; Impaired balance;Decreased mobility   Assessment Patient is a 79 y o  male evaluated by Physical Therapy s/p admit to [facility] on 3/11/2020 with admitting diagnosis of: Hyperkalemia, Syncope, UTI (urinary tract infection), Neuroendocrine tumor, Acute on chronic renal failure, C  difficile colitis, Syncope, unspecified syncope type, and principal problem of: Sepsis  PT was consulted to assess patient's functional mobility and discharge needs  Ordered are PT Evaluation and treatment with activity level of: up with assistance  Comorbidities affecting patient's physical performance at time of assessment include: DM, chronic Haile, stage 4 CKD, cardiac disease, weakness  Personal factors affecting the patient at time of IE include: ambulating with assistive device, step(s) to enter home, inability/difficulty performing IADLs and inability/difficulty performing ADLs  Please locate objective findings from PT assessment regarding body systems outlined above  Upon evaluation, pt requiring min-modA x 2, RW, and verbal cuing to perform all functional mobility  Pt also requiring increased time to initiate tasks d/t extreme fatigue  Pt on 2L O2 throughout session and was able to maintaining WFL SpO2 and HR  Pt did not experience LOB during ambulation, however distance limited to 10' into bathroom  Pt will require continued PT intervention during LOS to address current limitations, increase LOF, and facilitate safe d/c to next level of care when medically appropriate  D/c recommendation at this time is short term skilled PT  Goals   Patient Goals to feel better   Short Term Goal #1 Pt will participate in B LE strengthening exercises to facilitate improved functional mobility     LTG Expiration Date 03/26/20   Long Term Goal #1 Pt will perform all functional transfers and bed mobility with Jemma x 1 and good safety awareness  Long Term Goal #2 Pt will ambulate 76' with RW and SUP while maintaining WFL SpO2 and HR  Plan   Treatment/Interventions Functional transfer training;LE strengthening/ROM; Therapeutic exercise; Endurance training;Bed mobility;Gait training   PT Frequency Other (Comment)  (3-5x/week)   Recommendation   Recommendation Short-term skilled PT     Pt seated on toilet at end of session  Pt instructed to pull cord when finished and demonstrated understanding  PCA made aware of pt's location

## 2020-03-12 NOTE — OCCUPATIONAL THERAPY NOTE
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    Increased anion gap metabolic acidosis    Past Medical History  Past Medical History:   Diagnosis Date    Acute pancreatitis without infection or necrosis 9/26/2019    Anemia of chronic renal failure     BPH (benign prostatic hyperplasia)     Cancer (HCC)     liver cancer    Cardiac disease     Chronic kidney disease, stage 3 ([facility] )     Coronary artery disease     [facility]     DJD (degenerative joint disease)     Essential hypertension     Gait disturbance     Generalized weakness     GERD (gastroesophageal reflux disease)     Gout     History of transfusion     Hydronephrosis     Hyperlipidemia     Hypertension     Liver cancer ([facility]     Pressure injury of skin     Proteinuria     Renal disorder     Retention of urine     Thrombophlebitis migrans     Type 2 diabetes mellitus ([facility] )     Type 2 diabetes 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, with long-term current use of insulin ([facility] ) 1/23/2019     Past Surgical History  Past Surgical History:   Procedure Laterality Date    CHOLECYSTECTOMY      CHOLECYSTECTOMY LAPAROSCOPIC N/A 2/7/2020    Procedure: CHOLECYSTECTOMY LAPAROSCOPIC;  Surgeon:  Zulma Ortiz MD;  Location: [address] [place] [address];  Service: General    CORONARY ANGIOPLASTY WITH STENT PLACEMENT      IR IMAGE GUIDED BIOPSY/ASPIRATION LIVER  1/24/2019    IR TUBE PLACEMENT ROQUE  9/6/2019 03/12/20 0935   Note Type   Note type Eval/Treat   Restrictions/Precautions   Weight Bearing Precautions Per Order No   Other Precautions Fall Risk;O2;Multiple lines;Telemetry; Bed Alarm;Contact/isolation   Pain Assessment   Pain Assessment Tool Pain Assessment not indicated - pt denies pain   Home Living   Type of [address] One level;Stairs to enter with rails; Performs ADLs on one level; Able to live on main level with bedroom/bathroom  (2 BETH c HR)   Bathroom Shower/Tub Walk-in shower   Bathroom Toilet Raised   Bathroom Equipment Grab bars in shower; Shower chair;Commode;Grab bars around toilet   [facility]; Wheelchair-manual;Grab bars; Other (Comment)  (standard walker)   Additional Comments pt reports use of RW at baseline during functional mobility   Prior Function   Level of [place] Needs assistance with ADLs and functional mobility; Needs assistance with IADLs   Lives With Significant other   Receives Help From Family   ADL Assistance Needs assistance   IADLs Needs assistance   Falls in the last 6 months 1 to 4   Vocational Retired   Comments pt's wife drives; pt recently returned home from [employer] and receiving home health currently with PT and nursing    Psychosocial   Psychosocial (WDL) WDL   Subjective   Subjective "I just feel so weak"   ADL   Where Assessed Other (Comment)  (bathroom)   LB Dressing Assistance 3  Moderate Assistance   Toileting Assistance  3  Moderate Assistance   Toileting Deficit Perineal hygiene   Bed Mobility   Supine to Sit 3  Moderate assistance   Additional items Assist x 1; Increased time required;LE management; Bedrails   Sit to Supine   (seated on toilet at end of session)   Additional Comments pt on 2L o2 during session Spo2 WFL and no complaints of SOB   Transfers   Sit to Stand 3  Moderate assistance   Additional items Assist x 2; Increased time required;Verbal cues  (RW)   Stand to Sit 3  Moderate assistance   Additional items Assist x 2; Increased time required;Verbal cues  (RW)   Toilet transfer 4  Minimal assistance   Additional items Assist x 1; Increased time required;Verbal cues;Raised toilet seat  (RW)   Additional Comments pt performed functonal transfers with use of RW; pt trials functional transfers without (A) initially and unable to complete requiring mod (A) x2 for completion of transfer   Functional Mobility   Functional Mobility 4  Minimal assistance   Additional Comments x1-2 with use of RW; pt limited by weakness and fatigue; pt seated on toilet at end of session and PCA aware of the same    Additional items Rolling walker   Balance   Static Sitting Fair +   Dynamic Sitting Fair +   Static Standing Fair -   Dynamic Standing Fair -   Ambulatory Fair -   Activity Tolerance   Activity Tolerance Patient limited by fatigue   RUE Assessment   RUE Assessment X  (4-/5 grossly; WFL AROM)   LUE Assessment   LUE Assessment X  (4-/5 grossly; WFL AROM)   Hand Function   Gross Motor Coordination Functional   Fine Motor Coordination Functional   Sensation   Light Touch No apparent deficits   Sharp/Dull No apparent deficits   Cognition   Overall Cognitive Status WFL   Arousal/Participation Alert   Attention Within functional limits   Orientation Level Oriented X4   Memory Within functional limits   Following Commands Follows one step commands without difficulty   Comments pt very soft spoken   Assessment   Limitation Decreased ADL status; Decreased UE strength;Decreased Safe judgement during ADL;Decreased endurance;Decreased self-care trans;Decreased high-level ADLs   Assessment Pt is a 79 y o  male seen for OT evaluation s/p admit to [facility] on 3/11/2020 w/ Sepsis ([facility] )  Comorbidities affecting pt's functional performance at time of assessment include: DM, HTN, cancer history and hyperlipidemia, cardiac disease, CAD, renal disorder, gait disturbance, gout, DJD, BPH, proteinuria, hydronephrosis   Personal factors affecting pt at time of IE include:steps to enter environment, difficulty performing ADLS, difficulty performing IADLS , limited insight into deficits, decreased initiation and engagement  and health management   Prior to admission, pt was (A) with ADLs and IADLs with use of RW during functional mobility  Upon evaluation: Pt requires (S)-max (A) x1-2 with use of RW during functional mobility 2* the following deficits impacting occupational performance: weakness, decreased strength, decreased balance, decreased tolerance, impaired initiation, impaired problem solving, decreased safety awareness and impaired interpersonal skills  Pt to benefit from continued skilled OT tx while in the hospital to address deficits as defined above and maximize level of functional independence w ADL's and functional mobility  Occupational Performance areas to address include: grooming, bathing/shower, toilet hygiene, dressing, functional mobility, community mobility and clothing management  From OT standpoint, recommendation at time of d/c would be short term rehab  Goals   Patient Goals to feel better    Short Term Goal  pt will perform UE strengthening exercises seated in chair    Long Term Goal #1 pt will demonstrate UB/LB bathing and grooming tasks seated in chair at min (A) level    Long Term Goal #2 pt will increase independence with functional mobility with RW to (S) level with increased endurance and distance   Long Term Goal pt will increase independence with toilet transfers and hygeine at (S) level    Plan   Treatment Interventions ADL retraining;UE strengthening/ROM; Endurance training;Patient/family training; Activityengagement   Goal Expiration Date 03/26/20   OT Frequency 3-5x/wk   Recommendation   OT Discharge Recommendation Short Term Rehab   Barthel Index   Feeding 5   Bathing 0   Grooming Score 0   Dressing Score 5   Bladder Score 5   Bowels Score 5   Toilet Use Score 5   Transfers (Bed/Chair) Score 5   Mobility (Level Surface) Score 10   Stairs Score 0   Barthel Index Score 40     Pt will benefit from continued OT services in order to maximize (I) c ADL performance, FM c RW, and improve overall endurance/strength required to complete functional tasks in preparation for d/c      Pt left seated on toilet at end of session; instructed to ring call bell; PCA aware

## 2020-03-12 NOTE — SPEECH THERAPY NOTE
Speech/Language Pathology Note    Patient Name: Alda ROE Date: 3/12/2020     Consult received and chart reviewed  Per chart review and discussion with RN, pt passed RN dysphagia assessment and is tolerating regular diet with thin liquids with no s/s of aspiration  Speech/Language deficits also denied  Formal speech/swallow evaluation does not appear to be indicated at this time  If new concerns arise, please reconsult  Thank you

## 2020-03-12 NOTE — PLAN OF CARE
Problem: PHYSICAL THERAPY ADULT  Goal: Performs mobility at highest level of function for planned discharge setting  See evaluation for individualized goals  Description  Treatment/Interventions: Functional transfer training, LE strengthening/ROM, Therapeutic exercise, Endurance training, Bed mobility, Gait training          See flowsheet documentation for full assessment, interventions and recommendations  Note:   Prognosis: Guarded  Problem List: Decreased strength, Decreased endurance, Impaired balance, Decreased mobility  Assessment: Patient is a 79 y o  male evaluated by Physical Therapy s/p admit to [facility] on 3/11/2020 with admitting diagnosis of: Hyperkalemia, Syncope, UTI (urinary tract infection), Neuroendocrine tumor, Acute on chronic renal failure, C  difficile colitis, Syncope, unspecified syncope type, and principal problem of: Sepsis  PT was consulted to assess patient's functional mobility and discharge needs  Ordered are PT Evaluation and treatment with activity level of: up with assistance  Comorbidities affecting patient's physical performance at time of assessment include: DM, chronic Haile, stage 4 CKD, cardiac disease, weakness  Personal factors affecting the patient at time of IE include: ambulating with assistive device, step(s) to enter home, inability/difficulty performing IADLs and inability/difficulty performing ADLs  Please locate objective findings from PT assessment regarding body systems outlined above  Upon evaluation, pt requiring min-modA x 2, RW, and verbal cuing to perform all functional mobility  Pt also requiring increased time to initiate tasks d/t extreme fatigue  Pt on 2L O2 throughout session and was able to maintaining WFL SpO2 and HR  Pt did not experience LOB during ambulation, however distance limited to 10' into bathroom   Pt will require continued PT intervention during LOS to address current limitations, increase LOF, and facilitate safe d/c to next level of care when medically appropriate  D/c recommendation at this time is short term skilled PT  Recommendation: Short-term skilled PT          See flowsheet documentation for full assessment

## 2020-03-12 NOTE — PLAN OF CARE
Problem: OCCUPATIONAL THERAPY ADULT  Goal: Performs self-care activities at highest level of function for planned discharge setting  See evaluation for individualized goals  Description  Treatment Interventions: ADL retraining, UE strengthening/ROM, Endurance training, Patient/family training, Activityengagement          See flowsheet documentation for full assessment, interventions and recommendations  Note:   Limitation: Decreased ADL status, Decreased UE strength, Decreased Safe judgement during ADL, Decreased endurance, Decreased self-care trans, Decreased high-level ADLs     Assessment: Pt is a 79 y o  male seen for OT evaluation s/p admit to [facility] on 3/11/2020 w/ Sepsis ([facility] [address] )  Comorbidities affecting pt's functional performance at time of assessment include: DM, HTN, cancer history and hyperlipidemia, cardiac disease, CAD, renal disorder, gait disturbance, gout, DJD, BPH, proteinuria, hydronephrosis  Personal factors affecting pt at time of IE include:steps to enter environment, difficulty performing ADLS, difficulty performing IADLS , limited insight into deficits, decreased initiation and engagement  and health management   Prior to admission, pt was (A) with ADLs and IADLs with use of RW during functional mobility  Upon evaluation: Pt requires (S)-max (A) x1-2 with use of RW during functional mobility 2* the following deficits impacting occupational performance: weakness, decreased strength, decreased balance, decreased tolerance, impaired initiation, impaired problem solving, decreased safety awareness and impaired interpersonal skills  Pt to benefit from continued skilled OT tx while in the hospital to address deficits as defined above and maximize level of functional independence w ADL's and functional mobility  Occupational Performance areas to address include: grooming, bathing/shower, toilet hygiene, dressing, functional mobility, community mobility and clothing management   From OT standpoint, recommendation at time of d/c would be short term rehab       OT Discharge Recommendation: Short Term Rehab

## 2020-03-12 NOTE — SOCIAL WORK
Pt at this time is not agreeable to STR  Will continue to follow pt's status and discuss again with pt at a later time about going to STR

## 2020-03-12 NOTE — UTILIZATION REVIEW
Notification of Inpatient Admission/Inpatient Authorization Request   This is a Notification of Inpatient Admission for [address]  Be advised that this patient was admitted to our facility under Inpatient Status  Contact Nuha Rodrigues at [phone] for additional admission information  [facility] DEPT  DEDICATED [phone]  Patient Name:   Juancho Paredes   YOB: 1940       [address]   [address]:   [address]  Tax ID: [id number]  NPI: [id number] Attending Provider/NPI: Laverne Tapia Md [[id number]]   Place of Service Code: 24     Place of Service Name:  [address] [place] [address]   Start Date: 3/11/20 1238     Discharge Date & Time: No discharge date for patient encounter  Type of Admission: Inpatient Status Discharge Disposition   (if discharged): Home with [address] [place] [employer] [facility]   Patient Diagnoses: Hyperkalemia [E87 5]  Syncope [R55]  UTI (urinary tract infection) [N39 0]  Neuroendocrine tumor [D3A 8]  Acute on chronic renal failure ([facility] [address] ) [N17 9, N18 9]  C  difficile colitis [A04 72]  Syncope, unspecified syncope type [R55]     Orders: Admission Orders (From admission, onward)     Ordered        03/11/20 1238  Inpatient Admission  Once                    Assigned Utilization Review Contact: Nuha Rodrigues  Utilization   Network Utilization Review Department  Phone: [phone]; Fax [phone]  Email: [email or web address]   ATTENTION PAYERS: Please call the assigned Utilization  directly with any questions or concerns ALL voicemails in the department are confidential  Send all requests for admission clinical reviews, approved or denied determinations and any other requests to dedicated fax number belonging to the campus where the patient is receiving treatment

## 2020-03-12 NOTE — UTILIZATION REVIEW
Initial Clinical Review    Admission: Date/Time/Statement: Admission Orders (From admission, onward)     Ordered        03/11/20 1238  Inpatient Admission  Once                   Orders Placed This Encounter   Procedures    Inpatient Admission     Standing Status:   Standing     Number of Occurrences:   1     Order Specific Question:   Admitting Physician     Answer:   Neftaly Calles     Order Specific Question:   Level of Care     Answer:   Med Surg [16]     Order Specific Question:   Estimated length of stay     Answer:   More than 2 Midnights     Order Specific Question:   Certification     Answer:   I certify that inpatient services are medically necessary for this patient for a duration of greater than two midnights  See H&P and MD Progress Notes for additional information about the patient's course of treatment  ED Arrival Information     Expected Arrival Acuity Means of Arrival Escorted By Service Admission Type    - 3/11/2020 09:12 Emergent Stretcher Family Member General Medicine Emergency    Arrival Complaint    SYNCOPE        Chief Complaint   Patient presents with    Syncope     after injection of Somatyline  at infusion center  passed out  Assessment/Plan:   79 yom to er from infusion center s/p medical emergency 2nd syncopal episode after somatuline injection  After injection pt became unresponsive, pale diaphoretic, woke rapidly, however wife said not mentally back to baseline  Hx pancreatitis, anemia, bph, metastatic neuroendocrine tumor, ckd stg 3, cad, dm, djd, htn, hyponatremia, recently hospitalized for & currently being tx for c-diff  Admission work-up showing leukocytosis, hyperkalemia, hypomagnesemia, michelle  Admitted to inpatient status for sepsis 2nd possible cystitis 2nd indwelling garcia cath (recennt esbl ecoli uti) vs reactivation c-diff  Placed on contact & hand hygiene isolation, started on ivabt & ivf, continued on home po vanco  Urine & blood cultures pending     ED Triage Vitals   Temperature Pulse Respirations Blood Pressure SpO2   03/11/20 0917 03/11/20 0917 03/11/20 0917 03/11/20 0917 03/11/20 0917   (!) 96 7 °F (35 9 °C) 87 20 116/69 100 %      Temp Source Heart Rate Source Patient Position - Orthostatic VS BP Location FiO2 (%)   03/11/20 0917 03/11/20 0917 03/11/20 1116 03/11/20 0917 --   Temporal Monitor Lying Left arm       Pain Score       03/11/20 1014       No Pain        Wt Readings from Last 1 Encounters:   03/12/20 65 8 kg (145 lb 1 oz)     Additional Vital Signs:   03/11/20 15:36:05  97 3 °F (36 3 °C)Abnormal   83  18  146/82  103  100 %  --  --   03/11/20 1507  --  79  20  171/81Abnormal   --  100 %  Nasal cannula  Lying   03/11/20 1300  --  74  20  164/78  --  100 %  --  --   03/11/20 1116  --  76  20  132/71  --  100 %  Nasal cannula  Lying   03/11/20 1013  --  --  --  --  --  --  None (Room air)  --   03/11/20 0917  96 7 °F (35 9 °C)Abnormal   87  20  116/69  --  100 %  --  --   Pertinent Labs/Diagnostic Test Results:   Results from last 7 days   Lab Units 03/12/20  0549 03/11/20  1007 03/07/20  0519   WBC Thousand/uL 9 22 16 74* 7 30   HEMOGLOBIN g/dL 9 0* 11 2* 9 9*   HEMATOCRIT % 28 1* 35 1* 29 8*   PLATELETS Thousands/uL 174 190 192   NEUTROS ABS Thousands/µL 6 93 14 42* 5 10     Results from last 7 days   Lab Units 03/12/20  0549 03/11/20  1007 03/08/20  0523 03/07/20  0519   SODIUM mmol/L 138 136  --  139   POTASSIUM mmol/L 4 5 5 7*  --  5 0   CHLORIDE mmol/L 107 105  --  113*   CO2 mmol/L 19* 16*  --  20*   ANION GAP mmol/L 12 15*  --  6   BUN mg/dL 46* 53*  --  20   CREATININE mg/dL 2 50* 2 69*  --  1 95*   EGFR ml/min/1 73sq m 23 21  --  31   CALCIUM mg/dL 9 3 9 8  --  8 3*   MAGNESIUM mg/dL 1 9  --  1 6* 1 6*   PHOSPHORUS mg/dL 4 7*  --   --   --      Results from last 7 days   Lab Units 03/12/20  0549 03/11/20  1007   AST U/L 13 23   ALT U/L 18 25   ALK PHOS U/L 119* 156*   TOTAL PROTEIN g/dL 6 7 8 4*   ALBUMIN g/dL 2 4* 3 2*   TOTAL BILIRUBIN mg/dL 0 40 0 60     Results from last 7 days   Lab Units 03/12/20  1119 03/12/20  0738 03/11/20 2055 03/11/20  1633 03/08/20  1111 03/08/20  0538 03/07/20  2017 03/07/20  1611 03/07/20  1100 03/07/20  0556 03/06/20  2115 03/06/20  1628   POC GLUCOSE mg/dl 285* 98 255* 246* 197* 126 243* 164* 170* 65 196* 207*     Results from last 7 days   Lab Units 03/12/20  0549 03/11/20  1007 03/07/20  0519   GLUCOSE RANDOM mg/dL 117 248* 80     Results from last 7 days   Lab Units 03/12/20  0549   HEMOGLOBIN A1C % 6 6*   EAG mg/dl 143     BETA-HYDROXYBUTYRATE   Date Value Ref Range Status   02/05/2019 0 11 0 02 - 0 27 mmol/L Final      Results from last 7 days   Lab Units 03/11/20  1400   PH ART  7 344*   PCO2 ART mm Hg 25 2*   PO2 ART mm Hg 132 6*   HCO3 ART mmol/L 13 4*   BASE EXC ART mmol/L -10 8   O2 CONTENT ART mL/dL 15 0*   O2 HGB, ARTERIAL % 97 8*   ABG SOURCE  Radial, Left     Results from last 7 days   Lab Units 03/12/20  0549   CK TOTAL U/L 50     Results from last 7 days   Lab Units 03/12/20  0549 03/11/20  1007   TROPONIN I ng/mL <0 02 <0 02     Results from last 7 days   Lab Units 03/12/20  0549   TSH 3RD GENERATON uIU/mL 0 205*     Results from last 7 days   Lab Units 03/12/20  0549 03/11/20  1139   LACTIC ACID mmol/L 1 2 1 3     Results from last 7 days   Lab Units 03/12/20  0549   HEP B S AG  Non-reactive   HEP C AB  Non-reactive   HEP B C IGM  Non-reactive     Results from last 7 days   Lab Units 03/11/20  1143   CLARITY UA  Cloudy   COLOR UA  Yellow   SPEC GRAV UA  1 025   PH UA  5 5   GLUCOSE UA mg/dl Negative   KETONES UA mg/dl Negative   BLOOD UA  Small*   PROTEIN UA mg/dl 30 (1+)*   NITRITE UA  Positive*   BILIRUBIN UA  Negative   UROBILINOGEN UA E U /dl 0 2   LEUKOCYTES UA  Large*   WBC UA /hpf Innumerable*   RBC UA /hpf 2-4*   BACTERIA UA /hpf Innumerable*   EPITHELIAL CELLS WET PREP /hpf Occasional     Results from last 7 days   Lab Units 03/11/20  1506   INFLUENZA A PCR  None Detected   INFLUENZA B PCR  None Detected   RSV PCR  None Detected     Results from last 7 days   Lab Units 03/11/20  1910   C DIFF TOXIN B  Negative     Results from last 7 days   Lab Units 03/11/20  1909   SALMONELLA SP PCR  None Detected   SHIGELLA SP/ENTEROINVASIVE E  COLI (EIEC)  None Detected   CAMPYLOBACTER SP (JEJUNI AND COLI)  None Detected   SHIGA TOXIN 1/SHIGA TOXIN 2  None Detected     Results from last 7 days   Lab Units 03/11/20  1140 03/11/20  1139   BLOOD CULTURE  Received in Microbiology Lab  Culture in Progress  Received in Microbiology Lab  Culture in Progress  3/11  Ct head=No acute intracranial abnormality  Cxr=No acute cardiopulmonary disease  Ct chest, a/p=No evidence of acute abnormality in the chest, abdomen or pelvis within the limits of this unenhanced exam   Unchanged ectasia of the ascending aorta measuring 4 3 cm in diameter  Stable centrally calcified retractile mesenteric mass in keeping with patient's known carcinoid tumor  Known hepatic metastases are discernible but interval changes in size are difficult to evaluate in the absence of intravenous contrast   Unchanged periportal and left para-aortic nonenlarged and borderline lymph nodes, nonspecific    Ekg= Sinus rhythm with Premature atrial complexes  Left axis deviation  Minimal voltage criteria for LVH, may be normal variant  Inferior infarct , age undetermined  T wave abnormality, consider lateral ischemia  ED Treatment:   Medication Administration from 03/11/2020 0912 to 03/11/2020 1514       Date/Time Order Dose Route Action     03/11/2020 1008 sodium chloride 0 9 % bolus 500 mL 500 mL Intravenous New Bag     03/11/2020 1427 cefepime (MAXIPIME) IVPB (premix) 1,000 mg 1,000 mg Intravenous New Bag     03/11/2020 1503 vancomycin (VANCOCIN) IV piggyback 1,000 mg 1,000 mg Intravenous Given        Past Medical History:   Diagnosis Date    Acute pancreatitis without infection or necrosis 9/26/2019    Anemia of chronic renal failure     BPH (benign prostatic hyperplasia)     Cancer ([place] [address] )     liver cancer    Cardiac disease     Chronic kidney disease, stage 3 (HCC)     Coronary artery disease     Diabetes mellitus ([place] [address] )     DJD (degenerative joint disease)     Essential hypertension     Gait disturbance     Generalized weakness     GERD (gastroesophageal reflux disease)     Gout     History of transfusion     Hydronephrosis     Hyperlipidemia     Hypertension     Liver cancer ([place] [address] )     Pressure injury of skin     Proteinuria     Renal disorder     Retention of urine     Thrombophlebitis migrans     Type 2 diabetes mellitus (HCC)     Type 2 diabetes 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, with long-term current use of insulin ([place] [address] ) 1/23/2019     Present on Admission:   Sepsis ([place] [address] )   Acute cystitis with hematuria   Neuroendocrine tumor   Stage 4 chronic kidney disease ([place] [address] )  Admitting Diagnosis: Hyperkalemia [E87 5]  Syncope [R55]  UTI (urinary tract infection) [N39 0]  Neuroendocrine tumor [D3A 8]  Acute on chronic renal failure (HCC) [N17 9, N18 9]  C  difficile colitis [A04 72]  Syncope, unspecified syncope type [R55]  Age/Sex: 79 y o  male  Admission Orders:  Telemetry  accuchecks with coverage scale  Aspiration precautions  Contact & hand hygiene isolation  Pt/ot/st eval & tx  scd  Consult nutrition  Haile cath  Scheduled Medications:  aspirin 81 mg Oral Daily   b complex-vitamin C-folic acid 1 capsule Oral Daily With Dinner   cholecalciferol 1,000 Units Oral Daily   heparin (porcine) 5,000 Units Subcutaneous Q8H [address]   insulin detemir 10 Units Subcutaneous HS   insulin lispro 1-5 Units Subcutaneous TID AC   insulin lispro 1-5 Units Subcutaneous HS   piperacillin-tazobactam 2 25 g Intravenous Q8H   vancomycin 10 mg/kg Intravenous Q24H   vancomycin 125 mg Oral Q6H [address]     Continuous IV Infusions:  multi-electrolyte 75 mL/hr Intravenous Continuous     PRN Meds:  dextrose 25 mL Intravenous PRN     Network Utilization Review Department  [email or web address]  ATTENTION: Please call with any questions or concerns to [phone] and carefully listen to the prompts so that you are directed to the right person  All voicemails are confidential   Owen Miller all requests for admission clinical reviews, approved or denied determinations and any other requests to dedicated fax number below belonging to the campus where the patient is receiving treatment   List of dedicated fax numbers for the Facilities:  [address] [place] [address] DENIALS (Administrative/Medical Necessity) [phone]   [address] (Maternity/NICU/Pediatrics) [phone]   David Quintero [phone]   Catherine Promise [phone]   [facility] [phone]   Arsh Mera [phone]   [address] [place] [address] [phone]   [facility]  [phone]   [address] [facility]  [address] [facility] [address] [facility] [phone]

## 2020-03-12 NOTE — PROGRESS NOTES
Vancomycin IV Pharmacy-to-Dose Consultation    Ivy Robles is a 79 y o  male who is currently receiving Vancomycin IV with management by the Pharmacy Consult service  Assessment/Plan:  The patient was reviewed  Renal function is stable and no signs or symptoms of nephrotoxicity and/or infusion reactions were documented in the chart  Based on todays assessment, continue current vancomycin (day # 2) dosing of 750mg Q24H, with a plan for trough to be drawn at 1430  on 03/14/2020  We will continue to follow the patients culture results and clinical progress daily      David Saucedo, Pharmacist

## 2020-03-12 NOTE — SOCIAL WORK
Cm met with the patient to evaluate the patients prior function and living situation and any barriers to d/c and form a safe d/c plan  Cm also evaluated the patient for any services in the home or needs for services  Pt resides at home wity his s/o in a 1 story house  Has 2 BETH  Pt's s/o assists with the ADLs  Pt ambulates with a walker and also has a wc  Pt has [facility] coming in  Pt's s/o does the driving  PCP is Kacie Reynolds and pharmacy is [facility] in [place]  Plans at this time are home on dc with outpatient follow up and resumption of VNA services from [facility]  Pt is a 30 day readmission and is now being readmitted due to being a medical emergency alert in the [facility]  while receiving an IV lanreotide infusion for treatment of his metastatic neuroendocrine tumor  The patient experienced a syncopal episode and was found to have hypotension  Now being admitted with Sepsis was present on admission and secondary to possible acute cystitis associated with an indwelling Haile catheter versus reactivation of Clostridium difficile colitis  Pt was being treated at home for c diff and was on po vancomycin  Pt was recently at [facility] hospital from 3/3/20-3/8/20 for c diff diarrhea, type 2 DM with stage IV CKD; generalized weakness; essential HTN with hypotension; chronic indwelling fc; 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

## 2020-03-12 NOTE — SOCIAL WORK
Therapy recommending STR on dc for pt   CM will f/u with pt and s/o to discuss as pt had refused STR when he was dc'd from  [facility]

## 2020-03-13 PROBLEM — E43 SEVERE PROTEIN-CALORIE MALNUTRITION (HCC): Status: ACTIVE | Noted: 2020-03-13

## 2020-03-13 PROBLEM — I51.89 DIASTOLIC DYSFUNCTION: Status: ACTIVE | Noted: 2020-03-13

## 2020-03-13 LAB
ALBUMIN SERPL BCP-MCNC: 2.3 G/DL (ref 3.5–5)
ALP SERPL-CCNC: 128 U/L (ref 46–116)
ALT SERPL W P-5'-P-CCNC: 16 U/L (ref 12–78)
ANION GAP SERPL CALCULATED.3IONS-SCNC: 10 MMOL/L (ref 4–13)
ANION GAP SERPL CALCULATED.3IONS-SCNC: 13 MMOL/L (ref 4–13)
AST SERPL W P-5'-P-CCNC: 9 U/L (ref 5–45)
BASOPHILS # BLD AUTO: 0.07 THOUSANDS/ΜL (ref 0–0.1)
BASOPHILS NFR BLD AUTO: 1 % (ref 0–1)
BILIRUB SERPL-MCNC: 0.3 MG/DL (ref 0.2–1)
BUN SERPL-MCNC: 36 MG/DL (ref 5–25)
BUN SERPL-MCNC: 36 MG/DL (ref 5–25)
CALCIUM SERPL-MCNC: 8.3 MG/DL (ref 8.3–10.1)
CALCIUM SERPL-MCNC: 8.6 MG/DL (ref 8.3–10.1)
CHLORIDE SERPL-SCNC: 104 MMOL/L (ref 100–108)
CHLORIDE SERPL-SCNC: 106 MMOL/L (ref 100–108)
CO2 SERPL-SCNC: 20 MMOL/L (ref 21–32)
CO2 SERPL-SCNC: 20 MMOL/L (ref 21–32)
CREAT SERPL-MCNC: 2.31 MG/DL (ref 0.6–1.3)
CREAT SERPL-MCNC: 2.47 MG/DL (ref 0.6–1.3)
EOSINOPHIL # BLD AUTO: 0.09 THOUSAND/ΜL (ref 0–0.61)
EOSINOPHIL NFR BLD AUTO: 1 % (ref 0–6)
ERYTHROCYTE [DISTWIDTH] IN BLOOD BY AUTOMATED COUNT: 15.5 % (ref 11.6–15.1)
GFR SERPL CREATININE-BSD FRML MDRD: 24 ML/MIN/1.73SQ M
GFR SERPL CREATININE-BSD FRML MDRD: 26 ML/MIN/1.73SQ M
GLUCOSE SERPL-MCNC: 146 MG/DL (ref 65–140)
GLUCOSE SERPL-MCNC: 180 MG/DL (ref 65–140)
GLUCOSE SERPL-MCNC: 282 MG/DL (ref 65–140)
GLUCOSE SERPL-MCNC: 299 MG/DL (ref 65–140)
GLUCOSE SERPL-MCNC: 337 MG/DL (ref 65–140)
GLUCOSE SERPL-MCNC: 366 MG/DL (ref 65–140)
HCT VFR BLD AUTO: 28.4 % (ref 36.5–49.3)
HGB BLD-MCNC: 9 G/DL (ref 12–17)
HIV 1+2 AB+HIV1 P24 AG SERPL QL IA: NORMAL
IMM GRANULOCYTES # BLD AUTO: 0.04 THOUSAND/UL (ref 0–0.2)
IMM GRANULOCYTES NFR BLD AUTO: 0 % (ref 0–2)
LYMPHOCYTES # BLD AUTO: 1.23 THOUSANDS/ΜL (ref 0.6–4.47)
LYMPHOCYTES NFR BLD AUTO: 11 % (ref 14–44)
MAGNESIUM SERPL-MCNC: 1.6 MG/DL (ref 1.6–2.6)
MAGNESIUM SERPL-MCNC: 1.8 MG/DL (ref 1.6–2.6)
MCH RBC QN AUTO: 30.3 PG (ref 26.8–34.3)
MCHC RBC AUTO-ENTMCNC: 31.7 G/DL (ref 31.4–37.4)
MCV RBC AUTO: 96 FL (ref 82–98)
MONOCYTES # BLD AUTO: 0.93 THOUSAND/ΜL (ref 0.17–1.22)
MONOCYTES NFR BLD AUTO: 9 % (ref 4–12)
MRSA NOSE QL CULT: NORMAL
NEUTROPHILS # BLD AUTO: 8.49 THOUSANDS/ΜL (ref 1.85–7.62)
NEUTS SEG NFR BLD AUTO: 78 % (ref 43–75)
NRBC BLD AUTO-RTO: 0 /100 WBCS
PHOSPHATE SERPL-MCNC: 4.1 MG/DL (ref 2.3–4.1)
PLATELET # BLD AUTO: 173 THOUSANDS/UL (ref 149–390)
PMV BLD AUTO: 10.8 FL (ref 8.9–12.7)
POTASSIUM SERPL-SCNC: 4.3 MMOL/L (ref 3.5–5.3)
POTASSIUM SERPL-SCNC: 4.3 MMOL/L (ref 3.5–5.3)
PROCALCITONIN SERPL-MCNC: 0.7 NG/ML
PROT SERPL-MCNC: 6.5 G/DL (ref 6.4–8.2)
RBC # BLD AUTO: 2.97 MILLION/UL (ref 3.88–5.62)
SODIUM SERPL-SCNC: 134 MMOL/L (ref 136–145)
SODIUM SERPL-SCNC: 139 MMOL/L (ref 136–145)
T4 FREE SERPL-MCNC: 1.08 NG/DL (ref 0.76–1.46)
TROPONIN I SERPL-MCNC: <0.02 NG/ML
WBC # BLD AUTO: 10.85 THOUSAND/UL (ref 4.31–10.16)

## 2020-03-13 PROCEDURE — 85025 COMPLETE CBC W/AUTO DIFF WBC: CPT | Performed by: INTERNAL MEDICINE

## 2020-03-13 PROCEDURE — 93005 ELECTROCARDIOGRAM TRACING: CPT

## 2020-03-13 PROCEDURE — 83735 ASSAY OF MAGNESIUM: CPT | Performed by: PHYSICIAN ASSISTANT

## 2020-03-13 PROCEDURE — 84484 ASSAY OF TROPONIN QUANT: CPT | Performed by: INTERNAL MEDICINE

## 2020-03-13 PROCEDURE — 80048 BASIC METABOLIC PNL TOTAL CA: CPT | Performed by: PHYSICIAN ASSISTANT

## 2020-03-13 PROCEDURE — 84100 ASSAY OF PHOSPHORUS: CPT | Performed by: INTERNAL MEDICINE

## 2020-03-13 PROCEDURE — 97535 SELF CARE MNGMENT TRAINING: CPT

## 2020-03-13 PROCEDURE — 84439 ASSAY OF FREE THYROXINE: CPT | Performed by: INTERNAL MEDICINE

## 2020-03-13 PROCEDURE — 84145 PROCALCITONIN (PCT): CPT | Performed by: INTERNAL MEDICINE

## 2020-03-13 PROCEDURE — 83735 ASSAY OF MAGNESIUM: CPT | Performed by: INTERNAL MEDICINE

## 2020-03-13 PROCEDURE — 82948 REAGENT STRIP/BLOOD GLUCOSE: CPT

## 2020-03-13 PROCEDURE — 80053 COMPREHEN METABOLIC PANEL: CPT | Performed by: INTERNAL MEDICINE

## 2020-03-13 PROCEDURE — 97530 THERAPEUTIC ACTIVITIES: CPT

## 2020-03-13 PROCEDURE — 99232 SBSQ HOSP IP/OBS MODERATE 35: CPT | Performed by: INTERNAL MEDICINE

## 2020-03-13 PROCEDURE — 97110 THERAPEUTIC EXERCISES: CPT

## 2020-03-13 RX ORDER — MAGNESIUM SULFATE 1 G/100ML
1 INJECTION INTRAVENOUS ONCE
Status: COMPLETED | OUTPATIENT
Start: 2020-03-13 | End: 2020-03-14

## 2020-03-13 RX ADMIN — PIPERACILLIN AND TAZOBACTAM 2.25 G: 2; .25 INJECTION, POWDER, FOR SOLUTION INTRAVENOUS at 17:17

## 2020-03-13 RX ADMIN — VANCOMYCIN HYDROCHLORIDE 125 MG: 500 INJECTION, POWDER, LYOPHILIZED, FOR SOLUTION INTRAVENOUS at 17:17

## 2020-03-13 RX ADMIN — VANCOMYCIN HYDROCHLORIDE 125 MG: 500 INJECTION, POWDER, LYOPHILIZED, FOR SOLUTION INTRAVENOUS at 11:25

## 2020-03-13 RX ADMIN — HEPARIN SODIUM 5000 UNITS: 5000 INJECTION INTRAVENOUS; SUBCUTANEOUS at 14:01

## 2020-03-13 RX ADMIN — INSULIN LISPRO 3 UNITS: 100 INJECTION, SOLUTION INTRAVENOUS; SUBCUTANEOUS at 22:42

## 2020-03-13 RX ADMIN — VANCOMYCIN HYDROCHLORIDE 125 MG: 500 INJECTION, POWDER, LYOPHILIZED, FOR SOLUTION INTRAVENOUS at 00:03

## 2020-03-13 RX ADMIN — SODIUM CHLORIDE, SODIUM GLUCONATE, SODIUM ACETATE, POTASSIUM CHLORIDE, MAGNESIUM CHLORIDE, SODIUM PHOSPHATE, DIBASIC, AND POTASSIUM PHOSPHATE 75 ML/HR: .53; .5; .37; .037; .03; .012; .00082 INJECTION, SOLUTION INTRAVENOUS at 23:28

## 2020-03-13 RX ADMIN — VANCOMYCIN HYDROCHLORIDE 750 MG: 750 INJECTION, POWDER, LYOPHILIZED, FOR SOLUTION INTRAVENOUS at 14:01

## 2020-03-13 RX ADMIN — VITAMIN D, TAB 1000IU (100/BT) 1000 UNITS: 25 TAB at 09:38

## 2020-03-13 RX ADMIN — MAGNESIUM SULFATE HEPTAHYDRATE 1 G: 1 INJECTION, SOLUTION INTRAVENOUS at 23:22

## 2020-03-13 RX ADMIN — INSULIN DETEMIR 20 UNITS: 100 INJECTION, SOLUTION SUBCUTANEOUS at 22:42

## 2020-03-13 RX ADMIN — INSULIN LISPRO 3 UNITS: 100 INJECTION, SOLUTION INTRAVENOUS; SUBCUTANEOUS at 17:17

## 2020-03-13 RX ADMIN — PIPERACILLIN AND TAZOBACTAM 2.25 G: 2; .25 INJECTION, POWDER, FOR SOLUTION INTRAVENOUS at 00:03

## 2020-03-13 RX ADMIN — PIPERACILLIN AND TAZOBACTAM 2.25 G: 2; .25 INJECTION, POWDER, FOR SOLUTION INTRAVENOUS at 09:38

## 2020-03-13 RX ADMIN — ASPIRIN 81 MG 81 MG: 81 TABLET ORAL at 09:38

## 2020-03-13 RX ADMIN — SODIUM CHLORIDE, SODIUM GLUCONATE, SODIUM ACETATE, POTASSIUM CHLORIDE, MAGNESIUM CHLORIDE, SODIUM PHOSPHATE, DIBASIC, AND POTASSIUM PHOSPHATE 75 ML/HR: .53; .5; .37; .037; .03; .012; .00082 INJECTION, SOLUTION INTRAVENOUS at 10:19

## 2020-03-13 RX ADMIN — VANCOMYCIN HYDROCHLORIDE 125 MG: 500 INJECTION, POWDER, LYOPHILIZED, FOR SOLUTION INTRAVENOUS at 05:29

## 2020-03-13 RX ADMIN — INSULIN LISPRO 3 UNITS: 100 INJECTION, SOLUTION INTRAVENOUS; SUBCUTANEOUS at 12:21

## 2020-03-13 RX ADMIN — HEPARIN SODIUM 5000 UNITS: 5000 INJECTION INTRAVENOUS; SUBCUTANEOUS at 05:31

## 2020-03-13 RX ADMIN — Medication 1 CAPSULE: at 17:17

## 2020-03-13 RX ADMIN — VANCOMYCIN HYDROCHLORIDE 125 MG: 500 INJECTION, POWDER, LYOPHILIZED, FOR SOLUTION INTRAVENOUS at 23:22

## 2020-03-13 RX ADMIN — HEPARIN SODIUM 5000 UNITS: 5000 INJECTION INTRAVENOUS; SUBCUTANEOUS at 22:42

## 2020-03-13 NOTE — PLAN OF CARE
Problem: Potential for Falls  Goal: Patient will remain free of falls  Description  INTERVENTIONS:  - Assess patient frequently for physical needs  -  Identify cognitive and physical deficits and behaviors that affect risk of falls  (fatigue, weakness)  -  [place] fall precautions as indicated by assessment  (high fall risk)  - Educate patient/family on patient safety including physical limitations  - Instruct patient to call for assistance with activity based on assessment  - Modify environment to reduce risk of injury  - Consider OT/PT consult to assist with strengthening/mobility    Outcome: Progressing     Problem: Prexisting or High Potential for Compromised Skin Integrity  Goal: Skin integrity is maintained or improved  Description  INTERVENTIONS:  - Identify patients at risk for skin breakdown  - Assess and monitor skin integrity  - Assess and monitor nutrition and hydration status  - Monitor labs   - Assess for incontinence   - Turn and reposition patient (every 2 hours and prn)  - Assist with mobility/ambulation (mod to max assist and walker)  - Relieve pressure over bony prominences  - Avoid friction and shearing  - Provide appropriate hygiene as needed including keeping skin clean and dry  - Evaluate need for skin moisturizer/barrier cream  - Collaborate with interdisciplinary team   - Patient/family teaching  - Consider wound care consult    Outcome: Progressing     Problem: Nutrition/Hydration-ADULT  Goal: Nutrient/Hydration intake appropriate for improving, restoring or maintaining nutritional needs  Description  Monitor and assess patient's nutrition/hydration status for malnutrition  Collaborate with interdisciplinary team and initiate plan and interventions as ordered  Monitor patient's weight and dietary intake as ordered or per policy  Utilize nutrition screening tool and intervene as necessary  Determine patient's food preferences and provide high-protein, high-caloric foods as appropriate  INTERVENTIONS:  - Monitor oral intake, urinary output, labs, and treatment plans  - Assess nutrition and hydration status and recommend course of action  - Evaluate amount of meals eaten  - Assist patient with eating if necessary   - Allow adequate time for meals  - Recommend/ encourage appropriate diets, oral nutritional supplements, and vitamin/mineral supplements  - Order, calculate, and assess calorie counts as needed  - Assess need for intravenous fluids  - Provide specific nutrition/hydration education as appropriate  - Include patient/family/caregiver in decisions related to nutrition   Outcome: Progressing     Problem: DISCHARGE PLANNING - CARE MANAGEMENT  Goal: Discharge to post-acute care or home with appropriate resources  Description  INTERVENTIONS:  - Conduct assessment to determine patient/family and health care team treatment goals, and need for post-acute services based on payer coverage, community resources, and patient preferences, and barriers to discharge  - Address psychosocial, clinical, and financial barriers to discharge as identified in assessment in conjunction with the patient/family and health care team  - Arrange appropriate level of post-acute services according to patient's   needs and preference and payer coverage in collaboration with the physician and health care team  - Communicate with and update the patient/family, physician, and health care team regarding progress on the discharge plan  - Arrange appropriate transportation to post-acute venues  -? STR vs home with homecare/home PT and OP follow up    Outcome: Progressing     Problem: PAIN - ADULT  Goal: Verbalizes/displays adequate comfort level or baseline comfort level  Description  Interventions:  - Encourage patient to monitor pain and request assistance  - Assess pain using appropriate pain scale (0-10 pain scale)  - Administer analgesics based on type and severity of pain and evaluate response  - Implement non-pharmacological measures as appropriate and evaluate response  - Consider cultural and social influences on pain and pain management  - Notify physician/advanced practitioner if interventions unsuccessful or patient reports new pain   Outcome: Progressing     Problem: INFECTION - ADULT  Goal: Absence or prevention of progression during hospitalization  Description  INTERVENTIONS:  - Assess and monitor for signs and symptoms of infection  - Monitor lab/diagnostic results  - Monitor all insertion sites, i e  indwelling lines  - Administer medications as ordered  - Instruct and encourage patient and family to use good hand hygiene technique  - Identify and instruct in appropriate isolation precautions for identified infection/condition (contact precautions)   Outcome: Progressing     Problem: SAFETY ADULT  Goal: Maintain or return to baseline ADL function  Description  INTERVENTIONS:  -  Assess patient's ability to carry out ADLs; (dependent for cares)  - Assess/evaluate cause of self-care deficits (fatigue, weakness)  - Assess range of motion  - Assess patient's mobility; (mod to max assist and walker)  - Assess patient's need for assistive devices and provide as appropriate (walker)  - Encourage maximum independence but intervene and supervise when necessary  - Involve family in performance of ADLs  - Assess for home care needs following discharge   - Consider OT consult to assist with ADL evaluation and planning for discharge  - Provide patient education as appropriate   Outcome: Progressing  Goal: Maintain or return mobility status to optimal level  Description  INTERVENTIONS:  - Assess patient's baseline mobility status (walker and assistance from spouse)    - Identify cognitive and physical deficits and behaviors that affect mobility (fatigue, weakness)  - Identify mobility aids required to assist with transfers and/or ambulation (gait belt, walker)  - [place] fall precautions as indicated by assessment (high fall risk)  - Record patient progress and toleration of activity level on Mobility SBAR; progress patient to next Phase/Stage  - Instruct patient to call for assistance with activity based on assessment  - Consider rehabilitation consult to assist with strengthening/weightbearing, etc    Outcome: Progressing     Problem: DISCHARGE PLANNING  Goal: Discharge to home or other facility with appropriate resources  Description  INTERVENTIONS:  - Identify barriers to discharge w/patient and caregiver  - Arrange for needed discharge resources and transportation as appropriate  - Identify discharge learning needs (meds, wound care, etc )  - Refer to Case Management Department for coordinating discharge planning if the patient needs post-hospital services based on physician/advanced practitioner order or complex needs related to functional status, cognitive ability, or social support system   Outcome: Progressing     Problem: Knowledge Deficit  Goal: Patient/family/caregiver demonstrates understanding of disease process, treatment plan, medications, and discharge instructions  Description  Complete learning assessment and assess knowledge base    Interventions:  - Provide teaching at level of understanding  - Provide teaching via preferred learning methods  Outcome: Progressing     Problem: GENITOURINARY - ADULT  Goal: Urinary catheter remains patent  Description  INTERVENTIONS:  - Assess patency of urinary catheter  - If patient has a chronic garcia, consider changing catheter if non-functioning (garcia is functioning)  - Follow guidelines for intermittent irrigation of non-functioning urinary catheter   Outcome: Progressing     Problem: METABOLIC, FLUID AND ELECTROLYTES - ADULT  Goal: Electrolytes maintained within normal limits  Description  INTERVENTIONS:  - Monitor labs and assess patient for signs and symptoms of electrolyte imbalances  - Administer electrolyte replacement as ordered  - Monitor response to electrolyte replacements, including repeat lab results as appropriate  - Instruct patient on fluid and nutrition as appropriate  Outcome: Progressing  Goal: Fluid balance maintained  Description  INTERVENTIONS:  - Monitor labs   - Monitor I/O and WT  - Instruct patient on fluid and nutrition as appropriate  - Assess for signs & symptoms of volume excess or deficit  Outcome: Progressing  Goal: Glucose maintained within target range  Description  INTERVENTIONS:  - Monitor Blood Glucose as ordered  - Assess for signs and symptoms of hyperglycemia and hypoglycemia  - Administer ordered medications to maintain glucose within target range  - Assess nutritional intake and initiate nutrition service referral as needed  Outcome: Progressing

## 2020-03-13 NOTE — ASSESSMENT & PLAN NOTE
· Urinary tract infection associated with indwelling Haile catheter  · Continue IV zosyn and IV vancomycin based on the urine culture results    Urine culture [[id number]] (Abnormal)  Collected: 03/11/20 1143   Lab Status: Preliminary result Specimen: Urine, Indwelling Haile Catheter Updated: 03/13/20 1135    Urine Culture >100,000 cfu/ml Escherichia coli ESBLAbnormal     Comment: An Extended-Spectrum Beta-Lactamase is being produced by this organism (requires contact precautions)  Cephalosporins are NOT effective for treating these organisms  For SEVERE infections (i e  bacteremia, septic shock, etc ), Carbapenems are the drug of choice  For MILD infections (i e  isolated urinary or biliary infection), high-dose Zosyn may be used          10,000-19,000 cfu/ml Enterococcus faecalisAbnormal    Susceptibility     Escherichia coli ESBL (1)     Antibiotic Interpretation Microscan Method Status    Amikacin ($$) Susceptible <=16 ug/ml LUIS Preliminary    Amoxicillin + Clavulanate Susceptible 8/4 ug/ml LUIS Preliminary    Ampicillin ($$) Resistant >16 00 ug/ml LUIS Preliminary    Ampicillin + Sulbactam ($) Resistant >16/8 ug/ml LUIS Preliminary    Aztreonam ($$$)  Resistant >16 ug/ml LUIS Preliminary    Cefazolin ($) Resistant >16 00 ug/ml LUIS Preliminary    Cefepime ($) Resistant >16 00 ug/ml LUIS Preliminary    Cefotaxime ($) Resistant >32 00 ug/ml LUIS Preliminary    Ceftazidime ($$) Resistant >16 ug/ml LUIS Preliminary    Ceftriaxone ($$) Resistant >32 00 ug/ml LUIS Preliminary    Cefuroxime ($$) Resistant >16 ug/ml LUIS Preliminary    Ciprofloxacin ($)  Resistant >2 00 ug/ml LUIS Preliminary    Ertapenem ($$$) Susceptible <=0 5 ug/ml LUIS Preliminary    Gentamicin ($$) Resistant >8 ug/ml LUIS Preliminary    Levofloxacin ($) Resistant >4 00 ug/ml LUIS Preliminary    Nitrofurantoin Resistant >64 ug/ml LUIS Preliminary    Piperacillin + Tazobactam ($$$) Susceptible <=4 ug/ml LUIS Preliminary    Tetracycline Susceptible <=4 ug/ml LUIS Preliminary    Tobramycin ($) Intermediate 8 ug/ml LUIS Preliminary    Trimethoprim + Sulfamethoxazole ($$$) Susceptible <=2/38 ug/ml LUIS Preliminary    Enterococcus faecalis (2)     Antibiotic Interpretation Microscan Method Status    Ampicillin ($$) Susceptible <=2 00 ug/ml LUIS Preliminary    Levofloxacin ($) Resistant >4 00 ug/ml LUIS Preliminary    Nitrofurantoin Susceptible <=32 ug/ml LUIS Preliminary    Tetracycline Resistant >8 ug/ml LUIS Preliminary    Vancomycin ($) Susceptible 1 00 ug/ml LUIS Preliminary

## 2020-03-13 NOTE — ASSESSMENT & PLAN NOTE
· Check a free T4 level    Results for Usha Locke (MRN [id number]) as of 3/12/2020 21:31   Ref   Range 3/12/2020 05:49   TSH 3RD GENERATON Latest Ref Range: 0 358 - 3 740 uIU/mL 0 205 (L)

## 2020-03-13 NOTE — ASSESSMENT & PLAN NOTE
· Sepsis was present on admission and secondary to possible acute cystitis associated with an indwelling Haile catheter  · SIRS criteria was met with hypothermia and a leukocytosis  · Continue IV zosyn and IV vancomycin based on the urine culture results  · Serial laboratory testing to monitor the patient's renal function while on the combination of IV zosyn and IV vancomycin  · Continue IV fluids with isolyte at 75 ml/hr  · Follow culture results  · The lactic acid level was within normal limits    I discussed goals of care with the patient, and the patient wishes to be a Level 1-Full Code

## 2020-03-13 NOTE — PHYSICAL THERAPY NOTE
PHYSICAL THERAPY NOTE          Patient Name: Kalia Pascual  [id number] Date: 3/13/2020     03/13/20 9635   Pain Assessment   Pain Assessment Tool Pain Assessment not indicated - pt denies pain   Restrictions/Precautions   Weight Bearing Precautions Per Order No   Other Precautions Fall Risk;O2;Multiple lines;Telemetry; Bed Alarm;Contact/isolation   General   Chart Reviewed Yes   Family/Caregiver Present No   Cognition   Overall Cognitive Status WFL   Arousal/Participation Alert; Cooperative   Attention Within functional limits   Orientation Level Oriented X4   Memory Within functional limits   Following Commands Follows one step commands without difficulty   Bed Mobility   Additional Comments pt seated OOB in chair at start of session   Transfers   Sit to Stand 3  Moderate assistance   Additional items Assist x 1;HOB elevated; Increased time required;Verbal cues   Stand to Sit 3  Moderate assistance   Additional items Assist x 1; Increased time required;Verbal cues   Ambulation/Elevation   Gait pattern Excessively slow; Foward flexed;Decreased foot clearance; Short stride   Gait Assistance 4  Minimal assist   Additional items Assist x 1   Assistive Device Rolling walker   Distance 10'   Balance   Static Sitting Fair +   Dynamic Sitting Fair +   Static Standing Fair +   Dynamic Standing Fair +   Ambulatory Fair +   Endurance Deficit   Endurance Deficit Yes   Endurance Deficit Description pt fatigued with activity   Activity Tolerance   Activity Tolerance Patient limited by fatigue   Exercises   Quad Sets Sitting;20 reps;AROM; Bilateral   Heelslides Sitting;20 reps;AROM; Bilateral   Glute Sets Sitting;20 reps;AROM; Bilateral   Hip Abduction Sitting;20 reps;AROM; Bilateral   Hip Adduction Sitting;20 reps;AROM; Bilateral   Knee AROM Long Arc Quad Sitting;20 reps;AROM; Bilateral   Ankle Pumps Sitting;20 reps;AROM; Bilateral   Marching Sitting;20 reps;AROM; Bilateral   Balance training    (Static standing balance of 4 mins x 2)   Assessment   Prognosis Guarded   Problem List Decreased strength;Decreased endurance; Impaired balance;Decreased mobility   Assessment Pt completes therapeutic exercises to increase Bilateral LE strength needed for an increase in functional mobility  Pt able to participate static/dynamic standing balance to increase functional task performance and work towards goals  Pt currently requires Mod A x1 when treating and verbal cues for safety with hand placement and technique to reduce the risk of injury and maximize the benefit of treatment  Pt is in need of continued activity in PT to improve strength balance endurance mobility transfers and ambulation with return to maximize LOF  From PT/mobility standpoint, recommendation at time of d/c would be Short term skilled PT in order to promote return to PLOF and independence  Goals   Patient Goals to get better   LTG Expiration Date 03/26/20   PT Treatment Day 1   Plan   Treatment/Interventions Functional transfer training;LE strengthening/ROM; Therapeutic exercise; Bed mobility;Gait training; Endurance training   Progress Progressing toward goals   PT Frequency   (3-5x/wk)   Recommendation   Recommendation Short-term skilled PT     Pt seated OOB in chair at end of session with call bell in reach   Pt progress was discussed with the PT

## 2020-03-13 NOTE — MALNUTRITION/BMI
This medical record reflects one or more clinical indicators suggestive of malnutrition  Malnutrition Findings:   Malnutrition type: Chronic illness  Degree of Malnutrition: Other severe protein calorie malnutrition  Malnutrition Characteristics: Muscle loss, Weight loss(severe PCM r/t significant wt loss of 12 6% x 6 months with temporal wasting  Patient receiving chemotherapy and had C diff  Treated with diabetic diet and glucerna supplements   )    BMI Findings: Body mass index is 22 63 kg/m²  See Nutrition note dated 3/13/2020 for additional details  Completed nutrition assessment is viewable in the nutrition documentation

## 2020-03-13 NOTE — PROGRESS NOTES
Progress Note - Aldair Penny 1940, 79 y o  male MRN: [id number]    Unit/Bed#: 415-01 Encounter: [id number]    Primary Care Provider: Ronnell Nguyễn DO   Date and time admitted to hospital: 3/11/2020  9:12 AM        * Sepsis [facility])  Assessment & Plan  · Sepsis was present on admission and secondary to possible acute cystitis associated with an indwelling Haile catheter  · SIRS criteria was met with hypothermia and a leukocytosis  · Continue IV zosyn and IV vancomycin based on the urine culture results  · Serial laboratory testing to monitor the patient's renal function while on the combination of IV zosyn and IV vancomycin  · Continue IV fluids with isolyte at 75 ml/hr  · Follow culture results  · The lactic acid level was within normal limits    I discussed goals of care with the patient, and the patient wishes to be a Level 1-Full Code  Acute cystitis with hematuria  Assessment & Plan  · Associated with indwelling Haile catheter  · Continue IV zosyn and IV vancomycin based on the urine culture results    Urine culture [[id number]] (Abnormal)  Collected: 03/11/20 1143   Lab Status: Preliminary result Specimen: Urine, Indwelling Haile Catheter Updated: 03/13/20 1135    Urine Culture >100,000 cfu/ml Escherichia coli ESBLAbnormal     Comment: An Extended-Spectrum Beta-Lactamase is being produced by this organism (requires contact precautions)  Cephalosporins are NOT effective for treating these organisms  For SEVERE infections (i e  bacteremia, septic shock, etc ), Carbapenems are the drug of choice  For MILD infections (i e  isolated urinary or biliary infection), high-dose Zosyn may be used          10,000-19,000 cfu/ml Enterococcus faecalisAbnormal    Susceptibility     Escherichia coli ESBL (1)     Antibiotic Interpretation Microscan Method Status    Amikacin ($$) Susceptible <=16 ug/ml LUIS Preliminary    Amoxicillin + Clavulanate Susceptible 8/4 ug/ml LUIS Preliminary    Ampicillin ($$) Resistant >16 00 ug/ml LUIS Preliminary    Ampicillin + Sulbactam ($) Resistant >16/8 ug/ml LUIS Preliminary    Aztreonam ($$$)  Resistant >16 ug/ml LUIS Preliminary    Cefazolin ($) Resistant >16 00 ug/ml LUIS Preliminary    Cefepime ($) Resistant >16 00 ug/ml LUIS Preliminary    Cefotaxime ($) Resistant >32 00 ug/ml LUIS Preliminary    Ceftazidime ($$) Resistant >16 ug/ml LUIS Preliminary    Ceftriaxone ($$) Resistant >32 00 ug/ml LUIS Preliminary    Cefuroxime ($$) Resistant >16 ug/ml LUIS Preliminary    Ciprofloxacin ($)  Resistant >2 00 ug/ml LUIS Preliminary    Ertapenem ($$$) Susceptible <=0 5 ug/ml LUIS Preliminary    Gentamicin ($$) Resistant >8 ug/ml LUIS Preliminary    Levofloxacin ($) Resistant >4 00 ug/ml LUIS Preliminary    Nitrofurantoin Resistant >64 ug/ml LUIS Preliminary    Piperacillin + Tazobactam ($$$) Susceptible <=4 ug/ml LUIS Preliminary    Tetracycline Susceptible <=4 ug/ml LUIS Preliminary    Tobramycin ($) Intermediate 8 ug/ml LUIS Preliminary    Trimethoprim + Sulfamethoxazole ($$$) Susceptible <=2/38 ug/ml LUIS Preliminary    Enterococcus faecalis (2)     Antibiotic Interpretation Microscan Method Status    Ampicillin ($$) Susceptible <=2 00 ug/ml LUIS Preliminary    Levofloxacin ($) Resistant >4 00 ug/ml LUIS Preliminary    Nitrofurantoin Susceptible <=32 ug/ml LUIS Preliminary    Tetracycline Resistant >8 ug/ml LUIS Preliminary    Vancomycin ($) Susceptible 1 00 ug/ml LUIS Preliminary                Syncope and collapse  Assessment & Plan  · Episode in the [facility] while receiving lanreotide infusion  · Telemetry montioring   · Troponin levels x 3 sets were within normal limits    Echo complete with contrast if indicated   Status: Final result   PACS Images      Show images for Echo complete with contrast if indicated   Study Result     [address] [facility] [address] [address]  [phone]     Transthoracic Echocardiogram  2D, M-mode, Doppler, and Color Doppler     Study date:  12-Mar-2020     Patient: Alma Petit  MR number: [id number]  Account number: [de-identified]  : 1940  Age: 79 years  Gender: Male  Status: Inpatient  Location: Bedside  Height: 68 in  Weight: 146 lb  BP: 149/ 72 mmHg     Indications: syncope, sepsis     Diagnoses: A41 9 - Sepsis, unspecified, R55  - Syncope and collapse     Sonographer:  Tere Hendricks RDCS  Referring Physician:  Jessie Nelson DO  Group:  Deanna Lin [facility]  Interpreting Physician:  Heriberto Sun DO     SUMMARY     LEFT VENTRICLE:  Systolic function was normal  Ejection fraction was estimated to be 60 %  Although no diagnostic regional wall motion abnormality was identified, this possibility cannot be completely excluded on the basis of this study  Doppler parameters were consistent with abnormal left ventricular relaxation (grade 1 diastolic dysfunction)      TRICUSPID VALVE:  There was mild regurgitation      HISTORY: PRIOR HISTORY: diabetes mellitus, chronic kidney disease, mixed hyperlipidemia, hypertension     PROCEDURE: The procedure was performed at the bedside  This was a routine study  The transthoracic approach was used  The study included complete 2D imaging, M-mode, complete spectral Doppler, and color Doppler  Images were obtained from  the parasternal, apical, subcostal, and suprasternal notch acoustic windows  Echocardiographic views were limited due to poor acoustic window availability, decreased penetration, and lung interference  This was a technically difficult  study      LEFT VENTRICLE: Size was normal  Systolic function was normal  Ejection fraction was estimated to be 60 %  Although no diagnostic regional wall motion abnormality was identified, this possibility cannot be completely excluded on the basis  of this study   Wall thickness was normal  DOPPLER: Doppler parameters were consistent with abnormal left ventricular relaxation (grade 1 diastolic dysfunction)      RIGHT VENTRICLE: The size was normal  Systolic function was normal  Wall thickness was normal      LEFT ATRIUM: Size was normal      RIGHT ATRIUM: Size was normal      MITRAL VALVE: Valve structure was normal  There was normal leaflet separation  DOPPLER: The transmitral velocity was within the normal range  There was no evidence for stenosis  There was no regurgitation      AORTIC VALVE: The valve was trileaflet  Leaflets exhibited normal thickness and normal cuspal separation  DOPPLER: Transaortic velocity was within the normal range  There was no evidence for stenosis  There was no regurgitation      TRICUSPID VALVE: The valve structure was normal  There was normal leaflet separation  DOPPLER: The transtricuspid velocity was within the normal range  There was no evidence for stenosis  There was mild regurgitation      PULMONIC VALVE: Leaflets exhibited normal thickness, no calcification, and normal cuspal separation  DOPPLER: The transpulmonic velocity was within the normal range  There was no regurgitation      PERICARDIUM: There was no pericardial effusion   The pericardium was normal in appearance      AORTA: The root exhibited normal size      SYSTEMIC VEINS: IVC: The inferior vena cava was not well visualized      SYSTEM MEASUREMENT TABLES     2D  %FS: 42 08 %  Ao Diam: 3 14 cm  EDV(Teich): 101 7 ml  EF(Teich): 73 08 %  ESV(Teich): 27 38 ml  IVSd: 1 07 cm  LA Area: 17 16 cm2  LA Diam: 3 71 cm  LVEDV MOD A4C: 76 23 ml  LVEF MOD A4C: 65 07 %  LVESV MOD A4C: 26 63 ml  LVIDd: 4 69 cm  LVIDs: 2 71 cm  LVLd A4C: 7 68 cm  LVLs A4C: 6 83 cm  LVOT Diam: 2 03 cm  LVPWd: 1 16 cm  RA Area: 12 3 cm2  RVIDd: 2 57 cm  RWT: 0 5  SV MOD A4C: 49 6 ml  SV(Teich): 74 32 ml     CW  AV Vmax: 1 48 m/s  AV maxP 83 mmHg  PV Vmax: 1 29 m/s  PV maxP 67 mmHg  TR Vmax: 2 93 m/s  TR maxP 44 mmHg     MM  TAPSE: 2 6 cm     PW  PARIS Vmax, Pt: 1 97 cm2  E' Av 06 m/s  E' Lat: 0 07 m/s  E' Sept: 0 04 m/s  E/E' Av 89  E/E' Lat: 10 6  E/E' Sept: 16 42  LVOT Vmax: 0 9 m/s  LVOT maxPG: 3 22 mmHg  MV A Antonio: 1 14 m/s  MV Dec [place]: 2 51 m/s2  MV DecT: 287 61 ms  MV E Antonio: 0 72 m/s  MV E/A Ratio: 0 63  RVOT Vmax: 0 7 m/s  RVOT maxP 99 mmHg     [facility] Accredited Echocardiography Laboratory     Prepared and electronically signed by  Gino Boudreaux DO  Signed 12-Mar-2020 73:06:52         Diastolic dysfunction  Assessment & Plan  · Monitor the patient's volume status closely    Severe protein-calorie malnutrition ([address] )  Assessment & Plan  Malnutrition Findings:   Malnutrition type: Chronic illness  Degree of Malnutrition: Other severe protein calorie malnutrition    BMI Findings: Body mass index is 22 63 kg/m²  · Continue nutritional supplements per the dietitian's recommendations    Low TSH level  Assessment & Plan  · Recheck a TSH level in 1-2 weeks with the patient's PCP after discharge    Results for Tricia Emerson (MRN [id number]) as of 3/13/2020 16:28   Ref  Range 3/13/2020 05:26   Free T4 Latest Ref Range: 0 76 - 1 46 ng/dL 1 08       Results for Tricia Emerson (MRN [id number]) as of 3/12/2020 21:31   Ref  Range 3/12/2020 05:49   TSH 3RD GENERATON Latest Ref Range: 0 358 - 3 740 uIU/mL 0 205 (L)       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  Assessment & Plan  · Recheck Clostridium difficile stool culture with the patient having sepsis  · Utilize vancomycin 125 mg PO every 6 hours    Clostridium difficile toxin by PCR with EIA [[id number]] (Abnormal) Collected: 20   Lab Status: Final result Specimen: Stool from Per Rectum Updated: 20 142    C difficile toxin by PCR PositiveAbnormal     Comment: Result suggests infection or colonization with C  difficile  EIA testing has been performed to clarify  Maintain strict contact and hand hygiene precautions  C difficile Toxins A+B, EIA Negative    Comment: Probable C  difficile colonization without active infection   Treatment recommended if severe diarrhea without alternate etiology  Maintain strict contact and hand hygiene precautions  This is an appended report  Teresita Hargrove results have been appended to a previously preliminary verified report  Elevated total protein  Assessment & Plan  · Likely secondary to volume depletion  · Follow the total protein level    Hyperkalemia  Assessment & Plan  · Hemolyzed specimen  · Resolved  · Follow the potassium level    Stage 4 chronic kidney disease (HCC)  Assessment & Plan  · Baseline serum creatinine of 1 9-2 4 mg/dl  · Avoid all nephrotoxic agents  · Serial laboratory testing to monitor the patient's renal function and electrolytes    Chronic indwelling Haile catheter  Assessment & Plan  · Urinary tract infection associated with indwelling Haile catheter  · Continue IV zosyn and IV vancomycin based on the urine culture results    Urine culture [[id number]] (Abnormal)  Collected: 03/11/20 1143   Lab Status: Preliminary result Specimen: Urine, Indwelling Haile Catheter Updated: 03/13/20 1135    Urine Culture >100,000 cfu/ml Escherichia coli ESBLAbnormal     Comment: An Extended-Spectrum Beta-Lactamase is being produced by this organism (requires contact precautions)  Cephalosporins are NOT effective for treating these organisms  For SEVERE infections (i e  bacteremia, septic shock, etc ), Carbapenems are the drug of choice  For MILD infections (i e  isolated urinary or biliary infection), high-dose Zosyn may be used          10,000-19,000 cfu/ml Enterococcus faecalisAbnormal    Susceptibility     Escherichia coli ESBL (1)     Antibiotic Interpretation Microscan Method Status    Amikacin ($$) Susceptible <=16 ug/ml LUIS Preliminary    Amoxicillin + Clavulanate Susceptible 8/4 ug/ml LUIS Preliminary    Ampicillin ($$) Resistant >16 00 ug/ml LUIS Preliminary    Ampicillin + Sulbactam ($) Resistant >16/8 ug/ml LUIS Preliminary    Aztreonam ($$$)  Resistant >16 ug/ml LUIS Preliminary Cefazolin ($) Resistant >16 00 ug/ml LUIS Preliminary    Cefepime ($) Resistant >16 00 ug/ml LUIS Preliminary    Cefotaxime ($) Resistant >32 00 ug/ml LUIS Preliminary    Ceftazidime ($$) Resistant >16 ug/ml LUIS Preliminary    Ceftriaxone ($$) Resistant >32 00 ug/ml LUIS Preliminary    Cefuroxime ($$) Resistant >16 ug/ml LUIS Preliminary    Ciprofloxacin ($)  Resistant >2 00 ug/ml LUIS Preliminary    Ertapenem ($$$) Susceptible <=0 5 ug/ml LUIS Preliminary    Gentamicin ($$) Resistant >8 ug/ml LUIS Preliminary    Levofloxacin ($) Resistant >4 00 ug/ml LUIS Preliminary    Nitrofurantoin Resistant >64 ug/ml LUIS Preliminary    Piperacillin + Tazobactam ($$$) Susceptible <=4 ug/ml LUIS Preliminary    Tetracycline Susceptible <=4 ug/ml LUIS Preliminary    Tobramycin ($) Intermediate 8 ug/ml LUIS Preliminary    Trimethoprim + Sulfamethoxazole ($$$) Susceptible <=2/38 ug/ml LUIS Preliminary    Enterococcus faecalis (2)     Antibiotic Interpretation Microscan Method Status    Ampicillin ($$) Susceptible <=2 00 ug/ml LUIS Preliminary    Levofloxacin ($) Resistant >4 00 ug/ml LUIS Preliminary    Nitrofurantoin Susceptible <=32 ug/ml LUIS Preliminary    Tetracycline Resistant >8 ug/ml LUIS Preliminary    Vancomycin ($) Susceptible 1 00 ug/ml LUIS Preliminary                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  · Receiving lanreotide infusions as an outpatient with Hematology/Oncology    Type 2 diabetes mellitus with hyperglycemia, with long-term current use of insulin [facility])  Assessment & Plan  Lab Results   Component Value Date    HGBA1C 6 6 (H) 03/12/2020       Recent Labs     03/12/20  2033 03/13/20  0728 03/13/20  1203 03/13/20  1620   POCGLU 246* 146* 282* 299*       Blood Sugar Average: Last 72 hrs:  (P) 057 [id number]     · Increase levemir to 20 Units SQ QHS  · Hypoglycemia protocol  · Follow the blood glucose trend        VTE Pharmacologic Prophylaxis:   Pharmacologic: Heparin  Mechanical VTE Prophylaxis in Place: Yes    Patient Centered Rounds: I have performed bedside rounds with nursing staff today  Time Spent for Care: 30 minutes  More than 50% of total time spent on counseling and coordination of care as described above  Current Length of Stay: 2 day(s)    Current Patient Status: Inpatient   Certification Statement: The patient will continue to require additional inpatient hospital stay due to the need for IV antibiotic treatment and the need for continuous IV fluids  Code Status: Level 1 - Full Code      Subjective: The patient was seen and examined  The patient is doing better  No chest pain  No shortness of breath  No abdominal pain  No nausea or vomiting  Objective:     Vitals:   Temp (24hrs), Av 6 °F (37 °C), Min:98 3 °F (36 8 °C), Max:99 1 °F (37 3 °C)    Temp:  [98 3 °F (36 8 °C)-99 1 °F (37 3 °C)] 98 5 °F (36 9 °C)  HR:  [72-80] 80  Resp:  [16-18] 18  BP: (138-156)/(65-70) 149/65  SpO2:  [98 %-99 %] 98 %  Body mass index is 22 63 kg/m²  Input and Output Summary (last 24 hours):        Intake/Output Summary (Last 24 hours) at 3/13/2020 1639  Last data filed at 3/13/2020 1252  Gross per 24 hour   Intake 3060 ml   Output 1750 ml   Net 1310 ml       Physical Exam:     Physical Exam  General:  NAD, follows commands  HEENT:  NC/AT, mucous membranes moist  Neck:  Supple, No JVP elevation  CV:  + S1, + S2, RRR  Pulm:  Lung fields are CTA bilaterally  Abd:  Soft, Non-tender, Non-distended  Ext:  No clubbing/cyanosis/edema  Skin:  No rashes  Neuro:  Awake, alert, oriented  Psych:  Normal mood and affect  :  Indwelling Haile catheter      Additional Data:    Labs:    Results from last 7 days   Lab Units 20  0526   WBC Thousand/uL 10 85*   HEMOGLOBIN g/dL 9 0*   HEMATOCRIT % 28 4*   PLATELETS Thousands/uL 173   NEUTROS PCT % 78*   LYMPHS PCT % 11*   MONOS PCT % 9   EOS PCT % 1     Results from last 7 days   Lab Units 20  0526   SODIUM mmol/L 139   POTASSIUM mmol/L 4 3   CHLORIDE mmol/L 106   CO2 mmol/L 20*   BUN mg/dL 36*   CREATININE mg/dL 2 47*   ANION GAP mmol/L 13   CALCIUM mg/dL 8 6   ALBUMIN g/dL 2 3*   TOTAL BILIRUBIN mg/dL 0 30   ALK PHOS U/L 128*   ALT U/L 16   AST U/L 9   GLUCOSE RANDOM mg/dL 180*         Results from last 7 days   Lab Units 03/13/20  1620 03/13/20  1203 03/13/20  0728 03/12/20  2033 03/12/20  1556 03/12/20  1119 03/12/20  0738 03/11/20  2055 03/11/20  1633 03/08/20  1111 03/08/20  0538 03/07/20  2017   POC GLUCOSE mg/dl 299* 282* 146* 246* 234* 285* 98 255* 246* 197* 126 243*     Results from last 7 days   Lab Units 03/12/20  0549   HEMOGLOBIN A1C % 6 6*     Results from last 7 days   Lab Units 03/13/20  0526 03/12/20  0549 03/11/20  1139   LACTIC ACID mmol/L  --  1 2 1 3   PROCALCITONIN ng/ml 0 70* 1 23*  --            * I Have Reviewed All Lab Data Listed Above  * Additional Pertinent Lab Tests Reviewed: Laura 66 Admission Reviewed      Recent Cultures (last 7 days):     Results from last 7 days   Lab Units 03/11/20  1910 03/11/20  1143 03/11/20  1140 03/11/20  1139   BLOOD CULTURE   --   --  No Growth at 48 hrs  No Growth at 48 hrs     URINE CULTURE   --  >100,000 cfu/ml Escherichia coli ESBL*  10,000-19,000 cfu/ml Enterococcus faecalis*  --   --    C DIFF TOXIN B  Negative  --   --   --        Last 24 Hours Medication List:     Current Facility-Administered Medications:  acetaminophen 650 mg Oral Q6H PRN [place] Gibes, DO    aspirin 81 mg Oral Daily [place] Gibes, DO    b complex-vitamin C-folic acid 1 capsule Oral Daily With Siddhartha Jimenez,     cholecalciferol 1,000 Units Oral Daily [place] Gibes, DO    dextrose 25 mL Intravenous PRN [place] Gibes, DO    heparin (porcine) 5,000 Units Subcutaneous Q8H [facility] Joe Hernandez     insulin detemir 20 Units Subcutaneous HS Cruz Lopez DO    insulin lispro 1-5 Units Subcutaneous TID AC Cruz Lopez DO insulin lispro 1-5 Units Subcutaneous HS Arna Fan, DO    multi-electrolyte 75 mL/hr Intravenous Continuous Arna Fan, DO Last Rate: 75 mL/hr (03/13/20 1019)   piperacillin-tazobactam 2 25 g Intravenous Q8H Arna Fan, DO Last Rate: 2 25 g (03/13/20 0307)   vancomycin 10 mg/kg Intravenous Q24H Arna Fan, DO Last Rate: 750 mg (03/13/20 1401)   vancomycin 125 mg Oral Q6H 6225 Franklyn Dodson DO         Today, Patient Was Seen By: Anjana Singh DO    ** Please Note: Dictation voice to text software may have been used in the creation of this document   **

## 2020-03-13 NOTE — ASSESSMENT & PLAN NOTE
· Episode in the [facility] while receiving lanreotide infusion  · Telemetry montioring   · Check troponin levels  · Check a transthoracic echocardiogram

## 2020-03-13 NOTE — ASSESSMENT & PLAN NOTE
Lab Results   Component Value Date    HGBA1C 6 6 (H) 03/12/2020       Recent Labs     03/12/20  0738 03/12/20  1119 03/12/20  1556 03/12/20 2033   POCGLU 98 285* 234* 246*       Blood Sugar Average: Last 72 hrs:  (P) 394 [id number]     · Increase levemir to 15 Units SQ QHS  · Hypoglycemia protocol  · Follow the blood glucose trend

## 2020-03-13 NOTE — ASSESSMENT & PLAN NOTE
Malnutrition Findings:   Malnutrition type: Chronic illness  Degree of Malnutrition: Other severe protein calorie malnutrition    BMI Findings: Body mass index is 22 63 kg/m²       · Continue nutritional supplements per the dietitian's recommendations

## 2020-03-13 NOTE — PLAN OF CARE
Problem: PHYSICAL THERAPY ADULT  Goal: Performs mobility at highest level of function for planned discharge setting  See evaluation for individualized goals  Description  Treatment/Interventions: Functional transfer training, LE strengthening/ROM, Therapeutic exercise, Endurance training, Bed mobility, Gait training          See flowsheet documentation for full assessment, interventions and recommendations  Outcome: Progressing  Note:   Prognosis: Guarded  Problem List: Decreased strength, Decreased endurance, Impaired balance, Decreased mobility  Assessment: Pt completes therapeutic exercises to increase Bilateral LE strength needed for an increase in functional mobility  Pt able to participate static/dynamic standing balance to increase functional task performance and work towards goals  Pt currently requires Mod A x1 when treating and verbal cues for safety with hand placement and technique to reduce the risk of injury and maximize the benefit of treatment  Pt is in need of continued activity in PT to improve strength balance endurance mobility transfers and ambulation with return to maximize LOF  From PT/mobility standpoint, recommendation at time of d/c would be Short term skilled PT in order to promote return to PLOF and independence  Recommendation: Short-term skilled PT          See flowsheet documentation for full assessment

## 2020-03-13 NOTE — ASSESSMENT & PLAN NOTE
· Sepsis was present on admission and secondary to possible acute cystitis associated with an indwelling Haile catheter versus reactivation of Clostridium difficile colitis  · SIRS criteria was met with hypothermia and a leukocytosis  · Utilize IV zosyn based on previous urine culture with ESBL Escherichia coli  · Also will utilize IV vancomycin  · Serial laboratory testing to monitor the patient's renal function while on the combination of IV zosyn and IV vancomycin  · Continue IV fluids with isolyte at 75 ml/hr  · Check blood cultures x 2 sets  · Check a urine culture  · The lactic acid level was within normal limits  · Follow the procalcitonin level  · Check stool cultures including a Clostridium difficile culture, a Rotavirus stool antigen test, a stool culture for enteric bacterial pathogens, and stool for fecal leukocytes  I discussed goals of care with the patient, and the patient wishes to be a Level 1-Full Code

## 2020-03-13 NOTE — ASSESSMENT & PLAN NOTE
· Recheck Clostridium difficile stool culture with the patient having sepsis  · Utilize vancomycin 125 mg PO every 6 hours    Clostridium difficile toxin by PCR with EIA [[id number]] (Abnormal) Collected: 03/03/20 2057   Lab Status: Final result Specimen: Stool from Per Rectum Updated: 03/04/20 1429    C difficile toxin by PCR PositiveAbnormal     Comment: Result suggests infection or colonization with C  difficile  EIA testing has been performed to clarify  Maintain strict contact and hand hygiene precautions  C difficile Toxins A+B, EIA Negative    Comment: Probable C  difficile colonization without active infection  Treatment recommended if severe diarrhea without alternate etiology  Maintain strict contact and hand hygiene precautions  This is an appended report  Elena Rodrigues results have been appended to a previously preliminary verified report

## 2020-03-13 NOTE — ASSESSMENT & PLAN NOTE
Lab Results   Component Value Date    HGBA1C 6 6 (H) 03/12/2020       Recent Labs     03/12/20  2033 03/13/20  0728 03/13/20  1203 03/13/20  1620   POCGLU 246* 146* 282* 299*       Blood Sugar Average: Last 72 hrs:  (P) 350 [id number]     · Increase levemir to 20 Units SQ QHS  · Hypoglycemia protocol  · Follow the blood glucose trend

## 2020-03-13 NOTE — ASSESSMENT & PLAN NOTE
· Episode in the [facility] while receiving lanreotide infusion  · Telemetry montioring   · Troponin levels x 3 sets were within normal limits    Echo complete with contrast if indicated   Status: Final result   PACS Images      Show images for Echo complete with contrast if indicated   Study Result     [address]  [address], [address]  [phone]     Transthoracic Echocardiogram  2D, M-mode, Doppler, and Color Doppler     Study date:  12-Mar-2020     Patient: Benji Wise  MR number: [id number]  Account number: [de-identified]  : 1940  Age: 79 years  Gender: Male  Status: Inpatient  Location: Bedside  Height: 68 in  Weight: 146 lb  BP: 149/ 72 mmHg     Indications: syncope, sepsis     Diagnoses: A41 9 - Sepsis, unspecified, R55  - Syncope and collapse     Sonographer:  Fabienne Briseno RDCS  Referring Physician:  João Mcintosh DO  Group:  [facility]  Interpreting Physician:  Courtney Ramirez DO     SUMMARY     LEFT VENTRICLE:  Systolic function was normal  Ejection fraction was estimated to be 60 %  Although no diagnostic regional wall motion abnormality was identified, this possibility cannot be completely excluded on the basis of this study  Doppler parameters were consistent with abnormal left ventricular relaxation (grade 1 diastolic dysfunction)      TRICUSPID VALVE:  There was mild regurgitation      HISTORY: PRIOR HISTORY: diabetes mellitus, chronic kidney disease, mixed hyperlipidemia, hypertension     PROCEDURE: The procedure was performed at the bedside  This was a routine study  The transthoracic approach was used  The study included complete 2D imaging, M-mode, complete spectral Doppler, and color Doppler  Images were obtained from  the parasternal, apical, subcostal, and suprasternal notch acoustic windows   Echocardiographic views were limited due to poor acoustic window availability, decreased penetration, and lung interference  This was a technically difficult  study      LEFT VENTRICLE: Size was normal  Systolic function was normal  Ejection fraction was estimated to be 60 %  Although no diagnostic regional wall motion abnormality was identified, this possibility cannot be completely excluded on the basis  of this study  Wall thickness was normal  DOPPLER: Doppler parameters were consistent with abnormal left ventricular relaxation (grade 1 diastolic dysfunction)      RIGHT VENTRICLE: The size was normal  Systolic function was normal  Wall thickness was normal      LEFT ATRIUM: Size was normal      RIGHT ATRIUM: Size was normal      MITRAL VALVE: Valve structure was normal  There was normal leaflet separation  DOPPLER: The transmitral velocity was within the normal range  There was no evidence for stenosis  There was no regurgitation      AORTIC VALVE: The valve was trileaflet  Leaflets exhibited normal thickness and normal cuspal separation  DOPPLER: Transaortic velocity was within the normal range  There was no evidence for stenosis  There was no regurgitation      TRICUSPID VALVE: The valve structure was normal  There was normal leaflet separation  DOPPLER: The transtricuspid velocity was within the normal range  There was no evidence for stenosis  There was mild regurgitation      PULMONIC VALVE: Leaflets exhibited normal thickness, no calcification, and normal cuspal separation  DOPPLER: The transpulmonic velocity was within the normal range  There was no regurgitation      PERICARDIUM: There was no pericardial effusion   The pericardium was normal in appearance      AORTA: The root exhibited normal size      SYSTEMIC VEINS: IVC: The inferior vena cava was not well visualized      SYSTEM MEASUREMENT TABLES     2D  %FS: 42 08 %  Ao Diam: 3 14 cm  EDV(Teich): 101 7 ml  EF(Teich): 73 08 %  ESV(Teich): 27 38 ml  IVSd: 1 07 cm  LA Area: 17 16 cm2  LA Diam: 3 71 cm  LVEDV MOD A4C: 76 23 ml  LVEF MOD A4C: 65 07 %  LVESV MOD A4C: 26 63 ml  LVIDd: 4 69 cm  LVIDs: 2 71 cm  LVLd A4C: 7 68 cm  LVLs A4C: 6 83 cm  LVOT Diam: 2 03 cm  LVPWd: 1 16 cm  RA Area: 12 3 cm2  RVIDd: 2 57 cm  RWT: 0 5  SV MOD A4C: 49 6 ml  SV(Teich): 74 32 ml     CW  AV Vmax: 1 48 m/s  AV maxP 83 mmHg  PV Vmax: 1 29 m/s  PV maxP 67 mmHg  TR Vmax: 2 93 m/s  TR maxP 44 mmHg     MM  TAPSE: 2 6 cm     PW  PARIS Vmax, Pt: 1 97 cm2  E' Av 06 m/s  E' Lat: 0 07 m/s  E' Sept: 0 04 m/s  E/E' Av 89  E/E' Lat: 10 6  E/E' Sept: 16 42  LVOT Vmax: 0 9 m/s  LVOT maxPG: 3 22 mmHg  MV A Antonio: 1 14 m/s  MV Dec [place]: 2 51 m/s2  MV DecT: 287 61 ms  MV E Antonio: 0 72 m/s  MV E/A Ratio: 0 63  RVOT Vmax: 0 7 m/s  RVOT maxP 99 mmHg     [facility] Accredited Echocardiography Laboratory     Prepared and electronically signed by  Rohan Elias DO  Signed 12-Mar-2020 15:54:24

## 2020-03-13 NOTE — OCCUPATIONAL THERAPY NOTE
OT Note     03/13/20 0941   Restrictions/Precautions   Other Precautions Contact/isolation; Fall Risk;Multiple lines;Telemetry; Chair Alarm   ADL   Where Assessed Chair   Grooming Assistance 5  Supervision/Setup   UB Bathing Assistance 5  Supervision/Setup   UB Bathing Comments A with back   LB Bathing Assistance 5  Supervision/Setup   LB Bathing Comments Completes hips to ankles in seated, Nsg to complete catheter care, A with buttocks  Significant other reports A with feet at home   UB Dressing Assistance 4  Minimal Assistance   UB Dressing Comments A to manage lines and fasteners   LB Dressing Assistance   (Not assessed)   LB Dressing Comments Significant other reports completion at home   Transfers   Sit to Stand 4  Minimal assistance   Additional items Assist x 1; Armrests; Increased time required   Stand to Sit 4  Minimal assistance   Additional items Assist x 1; Armrests; Verbal cues   Activity Tolerance   Activity Tolerance   (Tolerates tx session)   Assessment   Assessment Pt with deficits affecting overall functional performance as per initial eval   Demonstrates decreased endurance and activity tolerance  SPoke with OTR who is in agreement pt will benefit from continued active OT services in attempt to facilitate return to prior LOF  Plan   Goal Expiration Date 03/26/20   OT Treatment Day 1   Recommendation   OT Discharge Recommendation Short Term Rehab   Remains seated recliner at bedside  All lines intact  Chair alarm activated  SCDs applied and turned on

## 2020-03-13 NOTE — ASSESSMENT & PLAN NOTE
· Recheck Clostridium difficile stool culture with the patient having sepsis  · Utilize vancomycin 125 mg PO every 6 hours    Clostridium difficile toxin by PCR with EIA [[id number]] (Abnormal) Collected: 03/03/20 2057   Lab Status: Final result Specimen: Stool from Per Rectum Updated: 03/04/20 1429    C difficile toxin by PCR PositiveAbnormal     Comment: Result suggests infection or colonization with C  difficile  EIA testing has been performed to clarify  Maintain strict contact and hand hygiene precautions  C difficile Toxins A+B, EIA Negative    Comment: Probable C  difficile colonization without active infection  Treatment recommended if severe diarrhea without alternate etiology  Maintain strict contact and hand hygiene precautions  This is an appended report  Silas Chavez results have been appended to a previously preliminary verified report

## 2020-03-13 NOTE — PLAN OF CARE
Problem: OCCUPATIONAL THERAPY ADULT  Goal: Performs self-care activities at highest level of function for planned discharge setting  See evaluation for individualized goals  Description  Treatment Interventions: ADL retraining, UE strengthening/ROM, Endurance training, Patient/family training, Activityengagement          See flowsheet documentation for full assessment, interventions and recommendations  Outcome: Progressing  Note:   Limitation: Decreased ADL status, Decreased UE strength, Decreased Safe judgement during ADL, Decreased endurance, Decreased self-care trans, Decreased high-level ADLs     Assessment: Pt with deficits affecting overall functional performance as per initial eval   Demonstrates decreased endurance and activity tolerance  SPoke with OTR who is in agreement pt will benefit from continued active OT services in attempt to facilitate return to prior LOF       OT Discharge Recommendation: Short Term Rehab

## 2020-03-13 NOTE — PROGRESS NOTES
Progress Note - Ivy Robles 1940, 79 y o  male MRN: [id number]    Unit/Bed#: 415-01 Encounter: [id number]    Primary Care Provider: Keke Nunez DO   Date and time admitted to hospital: 3/11/2020  9:12 AM        * Sepsis [facility])  Assessment & Plan  · Sepsis was present on admission and secondary to possible acute cystitis associated with an indwelling Haile catheter versus reactivation of Clostridium difficile colitis  · SIRS criteria was met with hypothermia and a leukocytosis  · Utilize IV zosyn based on previous urine culture with ESBL Escherichia coli  · Also will utilize IV vancomycin  · Serial laboratory testing to monitor the patient's renal function while on the combination of IV zosyn and IV vancomycin  · Continue IV fluids with isolyte at 75 ml/hr  · Check blood cultures x 2 sets  · Check a urine culture  · The lactic acid level was within normal limits  · Follow the procalcitonin level  · Check stool cultures including a Clostridium difficile culture, a Rotavirus stool antigen test, a stool culture for enteric bacterial pathogens, and stool for fecal leukocytes  I discussed goals of care with the patient, and the patient wishes to be a Level 1-Full Code  Syncope and collapse  Assessment & Plan  · Episode in the [facility] while receiving lanreotide infusion  · Telemetry montioring   · Check troponin levels  · Check a transthoracic echocardiogram    Low TSH level  Assessment & Plan  · Check a free T4 level    Results for Ata Love (MRN [id number]) as of 3/12/2020 21:31   Ref   Range 3/12/2020 05:49   TSH 3RD GENERATON Latest Ref Range: 0 358 - 3 740 uIU/mL 0 205 (L)       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  Assessment & Plan  · Recheck Clostridium difficile stool culture with the patient having sepsis  · Utilize vancomycin 125 mg PO every 6 hours    Clostridium difficile toxin by PCR with EIA [[id number]] (Abnormal) Collected: 03/03/20 2057   Lab Status: Final result Specimen: Stool from Per Rectum Updated: 03/04/20 1429    C difficile toxin by PCR PositiveAbnormal     Comment: Result suggests infection or colonization with C  difficile  EIA testing has been performed to clarify  Maintain strict contact and hand hygiene precautions  C difficile Toxins A+B, EIA Negative    Comment: Probable C  difficile colonization without active infection  Treatment recommended if severe diarrhea without alternate etiology  Maintain strict contact and hand hygiene precautions  This is an appended report  Marylu Jurado results have been appended to a previously preliminary verified report              Elevated total protein  Assessment & Plan  · Likely secondary to volume depletion  · Follow the total protein level    Hyperkalemia  Assessment & Plan  · Hemolyzed specimen  · Resolved  · Follow the potassium level    Stage 4 chronic kidney disease (HCC)  Assessment & Plan  · Baseline serum creatinine of 1 9-2 4 mg/dl  · Avoid all nephrotoxic agents  · Serial laboratory testing to monitor the patient's renal function and electrolytes    Chronic indwelling Haile catheter  Assessment & Plan  · Recent urine culture with ESBL Escherichia coli UTI  · Associated with indwelling Haile catheter  · Utilize IV zosyn based on previous urine culture result  · Check a urine culture    Acute cystitis with hematuria  Assessment & Plan  · Recent urine culture with ESBL Escherichia coli UTI  · Associated with indwelling Haile catheter  · Utilize IV zosyn based on previous urine culture result  · Check a urine culture    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  · Receiving lanreotide infusions as an outpatient with Hematology/Oncology    Type 2 diabetes mellitus with hyperglycemia, with long-term current use of insulin [facility])  Assessment & Plan  Lab Results   Component Value Date    HGBA1C 6 6 (H) 03/12/2020       Recent Labs     03/12/20  0738 03/12/20  1119 03/12/20  1556 20   POCGLU 98 285* 234* 246*       Blood Sugar Average: Last 72 hrs:  (P) 781 [id number]     · Increase levemir to 15 Units SQ QHS  · Hypoglycemia protocol  · Follow the blood glucose trend        VTE Pharmacologic Prophylaxis:   Pharmacologic: Heparin  Mechanical VTE Prophylaxis in Place: Yes    Patient Centered Rounds: I have performed bedside rounds with nursing staff today  Time Spent for Care: 30 minutes  More than 50% of total time spent on counseling and coordination of care as described above  Current Length of Stay: 1 day(s)    Current Patient Status: Inpatient   Certification Statement: The patient will continue to require additional inpatient hospital stay due to the need for IV antibiotic treatment and for continuous IV fluids  Code Status: Level 1 - Full Code      Subjective: The patient was seen and examined  The patient is doing better  No chest pain  No shortness of breath  No abdominal pain  No nausea or vomiting  Objective:     Vitals:   Temp (24hrs), Av 8 °F (36 6 °C), Min:97 3 °F (36 3 °C), Max:98 3 °F (36 8 °C)    Temp:  [97 3 °F (36 3 °C)-98 3 °F (36 8 °C)] 98 3 °F (36 8 °C)  HR:  [67-74] 74  Resp:  [16-18] 18  BP: (147-156)/(66-73) 156/70  SpO2:  [98 %-100 %] 98 %  Body mass index is 22 06 kg/m²  Input and Output Summary (last 24 hours):        Intake/Output Summary (Last 24 hours) at 3/12/2020 2138  Last data filed at 3/12/2020 193  Gross per 24 hour   Intake 3460 ml   Output 2550 ml   Net 910 ml       Physical Exam:     Physical Exam  General:  NAD, follows commands  HEENT:  NC/AT, mucous membranes moist  Neck:  Supple, No JVP elevation  CV:  + S1, + S2, RRR  Pulm:  Lung fields are CTA bilaterally  Abd:  Soft, Non-tender, Non-distended  Ext:  No clubbing/cyanosis/edema  Skin:  No rashes  Neuro:  Awake, alert, oriented  Psych:  Normal mood and affect      Additional Data:    Labs:    Results from last 7 days   Lab Units 20  0549   WBC Thousand/uL 9 22   HEMOGLOBIN g/dL 9 0*   HEMATOCRIT % 28 1*   PLATELETS Thousands/uL 174   NEUTROS PCT % 74   LYMPHS PCT % 13*   MONOS PCT % 10   EOS PCT % 1     Results from last 7 days   Lab Units 03/12/20  0549   SODIUM mmol/L 138   POTASSIUM mmol/L 4 5   CHLORIDE mmol/L 107   CO2 mmol/L 19*   BUN mg/dL 46*   CREATININE mg/dL 2 50*   ANION GAP mmol/L 12   CALCIUM mg/dL 9 3   ALBUMIN g/dL 2 4*   TOTAL BILIRUBIN mg/dL 0 40   ALK PHOS U/L 119*   ALT U/L 18   AST U/L 13   GLUCOSE RANDOM mg/dL 117         Results from last 7 days   Lab Units 03/12/20  2033 03/12/20  1556 03/12/20  1119 03/12/20  0738 03/11/20  2055 03/11/20  1633 03/08/20  1111 03/08/20  0538 03/07/20  2017 03/07/20  1611 03/07/20  1100 03/07/20  0556   POC GLUCOSE mg/dl 246* 234* 285* 98 255* 246* 197* 126 243* 164* 170* 65     Results from last 7 days   Lab Units 03/12/20  0549   HEMOGLOBIN A1C % 6 6*     Results from last 7 days   Lab Units 03/12/20  0549 03/11/20  1139   LACTIC ACID mmol/L 1 2 1 3   PROCALCITONIN ng/ml 1 23*  --            * I Have Reviewed All Lab Data Listed Above  * Additional Pertinent Lab Tests Reviewed: Laura 66 Admission Reviewed      Recent Cultures (last 7 days):     Results from last 7 days   Lab Units 03/11/20  1910 03/11/20  1143 03/11/20  1140 03/11/20  1139   BLOOD CULTURE   --   --  No Growth at 24 hrs  No Growth at 24 hrs     URINE CULTURE   --  >100,000 cfu/ml Gram Negative [facility]*  --   --    C DIFF TOXIN B  Negative  --   --   --        Last 24 Hours Medication List:     Current Facility-Administered Medications:  acetaminophen 650 mg Oral Q6H PRN Delmy Gotti DO    aspirin 81 mg Oral Daily Delmy Gotit DO    b complex-vitamin C-folic acid 1 capsule Oral Daily With Siddhartha Jimenez DO    cholecalciferol 1,000 Units Oral Daily Delmy Gotti DO    dextrose 25 mL Intravenous PRN Delmy Gotti DO    heparin (porcine) 5,000 Units Subcutaneous [facility] [employer], DO    insulin detemir 15 Units Subcutaneous HS [employer], DO    insulin lispro 1-5 Units Subcutaneous TID AC [employer], DO    insulin lispro 1-5 Units Subcutaneous HS [employer], DO    multi-electrolyte 75 mL/hr Intravenous Continuous [employer], DO Last Rate: 75 mL/hr (03/12/20 1937)   piperacillin-tazobactam 2 25 g Intravenous Q8H [employer], DO Last Rate: 2 25 g (03/12/20 1657)   vancomycin 10 mg/kg Intravenous Q24H [employer], DO Last Rate: 750 mg (03/12/20 1436)   vancomycin 125 mg Oral Q6H 6225 Franklyn Dodson DO         Today, Patient Was Seen By: Da Pope DO    ** Please Note: Dictation voice to text software may have been used in the creation of this document   **

## 2020-03-13 NOTE — ASSESSMENT & PLAN NOTE
· Associated with indwelling Haile catheter  · Continue IV zosyn and IV vancomycin based on the urine culture results    Urine culture [[id number]] (Abnormal)  Collected: 03/11/20 1143   Lab Status: Preliminary result Specimen: Urine, Indwelling Haile Catheter Updated: 03/13/20 1135    Urine Culture >100,000 cfu/ml Escherichia coli ESBLAbnormal     Comment: An Extended-Spectrum Beta-Lactamase is being produced by this organism (requires contact precautions)  Cephalosporins are NOT effective for treating these organisms  For SEVERE infections (i e  bacteremia, septic shock, etc ), Carbapenems are the drug of choice  For MILD infections (i e  isolated urinary or biliary infection), high-dose Zosyn may be used          10,000-19,000 cfu/ml Enterococcus faecalisAbnormal    Susceptibility     Escherichia coli ESBL (1)     Antibiotic Interpretation Microscan Method Status    Amikacin ($$) Susceptible <=16 ug/ml LUIS Preliminary    Amoxicillin + Clavulanate Susceptible 8/4 ug/ml LUIS Preliminary    Ampicillin ($$) Resistant >16 00 ug/ml LUIS Preliminary    Ampicillin + Sulbactam ($) Resistant >16/8 ug/ml LUIS Preliminary    Aztreonam ($$$)  Resistant >16 ug/ml LUIS Preliminary    Cefazolin ($) Resistant >16 00 ug/ml LUIS Preliminary    Cefepime ($) Resistant >16 00 ug/ml LUIS Preliminary    Cefotaxime ($) Resistant >32 00 ug/ml LUIS Preliminary    Ceftazidime ($$) Resistant >16 ug/ml LUIS Preliminary    Ceftriaxone ($$) Resistant >32 00 ug/ml LUIS Preliminary    Cefuroxime ($$) Resistant >16 ug/ml LUIS Preliminary    Ciprofloxacin ($)  Resistant >2 00 ug/ml LUIS Preliminary    Ertapenem ($$$) Susceptible <=0 5 ug/ml LUIS Preliminary    Gentamicin ($$) Resistant >8 ug/ml LUIS Preliminary    Levofloxacin ($) Resistant >4 00 ug/ml LUIS Preliminary    Nitrofurantoin Resistant >64 ug/ml LUIS Preliminary    Piperacillin + Tazobactam ($$$) Susceptible <=4 ug/ml LUIS Preliminary    Tetracycline Susceptible <=4 ug/ml LUIS Preliminary Tobramycin ($) Intermediate 8 ug/ml LUIS Preliminary    Trimethoprim + Sulfamethoxazole ($$$) Susceptible <=2/38 ug/ml LUIS Preliminary    Enterococcus faecalis (2)     Antibiotic Interpretation Microscan Method Status    Ampicillin ($$) Susceptible <=2 00 ug/ml LUIS Preliminary    Levofloxacin ($) Resistant >4 00 ug/ml LUIS Preliminary    Nitrofurantoin Susceptible <=32 ug/ml LUIS Preliminary    Tetracycline Resistant >8 ug/ml LUIS Preliminary    Vancomycin ($) Susceptible 1 00 ug/ml LUIS Preliminary

## 2020-03-13 NOTE — ASSESSMENT & PLAN NOTE
· Recheck a TSH level in 1-2 weeks with the patient's PCP after discharge    Results for Odette Foss (MRN [id number]) as of 3/13/2020 16:28   Ref  Range 3/13/2020 05:26   Free T4 Latest Ref Range: 0 76 - 1 46 ng/dL 1 08       Results for Odette Foss (MRN [id number]) as of 3/12/2020 21:31   Ref   Range 3/12/2020 05:49   TSH 3RD GENERATON Latest Ref Range: 0 358 - 3 740 uIU/mL 0 205 (L)

## 2020-03-13 NOTE — PROGRESS NOTES
Vancomycin IV Pharmacy-to-Dose Consultation    Alda Ferrell is a 79 y o  male who is currently receiving Vancomycin IV with management by the Pharmacy Consult service  Assessment/Plan:  The patient was reviewed  Renal function is stable and no signs or symptoms of nephrotoxicity and/or infusion reactions were documented in the chart  Based on todays assessment, continue current vancomycin (day # 3) dosing of 750mg q24h, with a plan for trough to be drawn at 1430 on 3/14/20  We will continue to follow the patients culture results and clinical progress daily      Sharonda Acosta, Pharmacist

## 2020-03-14 PROBLEM — E11.649 TYPE 2 DIABETES MELLITUS WITH HYPOGLYCEMIA WITHOUT COMA, WITH LONG-TERM CURRENT USE OF INSULIN (HCC): Status: ACTIVE | Noted: 2019-01-23

## 2020-03-14 PROBLEM — I49.1 PREMATURE ATRIAL COMPLEXES: Status: ACTIVE | Noted: 2020-03-14

## 2020-03-14 LAB
ALBUMIN SERPL BCP-MCNC: 2.1 G/DL (ref 3.5–5)
ALBUMIN SERPL ELPH-MCNC: 2.86 G/DL (ref 3.5–5)
ALBUMIN SERPL ELPH-MCNC: 49.3 % (ref 52–65)
ALBUMIN UR ELPH-MCNC: 16.6 %
ALP SERPL-CCNC: 125 U/L (ref 46–116)
ALPHA1 GLOB MFR UR ELPH: 22.5 %
ALPHA1 GLOB SERPL ELPH-MCNC: 0.49 G/DL (ref 0.1–0.4)
ALPHA1 GLOB SERPL ELPH-MCNC: 8.5 % (ref 2.5–5)
ALPHA2 GLOB MFR UR ELPH: 22.1 %
ALPHA2 GLOB SERPL ELPH-MCNC: 1.04 G/DL (ref 0.4–1.2)
ALPHA2 GLOB SERPL ELPH-MCNC: 17.9 % (ref 7–13)
ALT SERPL W P-5'-P-CCNC: 17 U/L (ref 12–78)
ANION GAP SERPL CALCULATED.3IONS-SCNC: 13 MMOL/L (ref 4–13)
AST SERPL W P-5'-P-CCNC: 10 U/L (ref 5–45)
B-GLOBULIN MFR UR ELPH: 33 %
BACTERIA UR CULT: ABNORMAL
BASOPHILS # BLD AUTO: 0.07 THOUSANDS/ΜL (ref 0–0.1)
BASOPHILS NFR BLD AUTO: 1 % (ref 0–1)
BETA GLOB ABNORMAL SERPL ELPH-MCNC: 0.37 G/DL (ref 0.4–0.8)
BETA1 GLOB SERPL ELPH-MCNC: 6.3 % (ref 5–13)
BETA2 GLOB SERPL ELPH-MCNC: 7 % (ref 2–8)
BETA2+GAMMA GLOB SERPL ELPH-MCNC: 0.41 G/DL (ref 0.2–0.5)
BILIRUB SERPL-MCNC: 0.4 MG/DL (ref 0.2–1)
BUN SERPL-MCNC: 33 MG/DL (ref 5–25)
CALCIUM SERPL-MCNC: 8.6 MG/DL (ref 8.3–10.1)
CHLORIDE SERPL-SCNC: 107 MMOL/L (ref 100–108)
CO2 SERPL-SCNC: 21 MMOL/L (ref 21–32)
CREAT SERPL-MCNC: 2.14 MG/DL (ref 0.6–1.3)
EOSINOPHIL # BLD AUTO: 0.1 THOUSAND/ΜL (ref 0–0.61)
EOSINOPHIL NFR BLD AUTO: 1 % (ref 0–6)
ERYTHROCYTE [DISTWIDTH] IN BLOOD BY AUTOMATED COUNT: 15 % (ref 11.6–15.1)
GAMMA GLOB ABNORMAL SERPL ELPH-MCNC: 0.64 G/DL (ref 0.5–1.6)
GAMMA GLOB MFR UR ELPH: 5.8 %
GAMMA GLOB SERPL ELPH-MCNC: 11 % (ref 12–22)
GFR SERPL CREATININE-BSD FRML MDRD: 28 ML/MIN/1.73SQ M
GLUCOSE SERPL-MCNC: 137 MG/DL (ref 65–140)
GLUCOSE SERPL-MCNC: 244 MG/DL (ref 65–140)
GLUCOSE SERPL-MCNC: 250 MG/DL (ref 65–140)
GLUCOSE SERPL-MCNC: 258 MG/DL (ref 65–140)
GLUCOSE SERPL-MCNC: 48 MG/DL (ref 65–140)
GLUCOSE SERPL-MCNC: 91 MG/DL (ref 65–140)
HCT VFR BLD AUTO: 25.3 % (ref 36.5–49.3)
HGB BLD-MCNC: 8.3 G/DL (ref 12–17)
IGG/ALB SER: 0.97 {RATIO} (ref 1.1–1.8)
IMM GRANULOCYTES # BLD AUTO: 0.03 THOUSAND/UL (ref 0–0.2)
IMM GRANULOCYTES NFR BLD AUTO: 0 % (ref 0–2)
LYMPHOCYTES # BLD AUTO: 1.05 THOUSANDS/ΜL (ref 0.6–4.47)
LYMPHOCYTES NFR BLD AUTO: 13 % (ref 14–44)
MAGNESIUM SERPL-MCNC: 1.9 MG/DL (ref 1.6–2.6)
MCH RBC QN AUTO: 30.4 PG (ref 26.8–34.3)
MCHC RBC AUTO-ENTMCNC: 32.8 G/DL (ref 31.4–37.4)
MCV RBC AUTO: 93 FL (ref 82–98)
MONOCYTES # BLD AUTO: 0.79 THOUSAND/ΜL (ref 0.17–1.22)
MONOCYTES NFR BLD AUTO: 10 % (ref 4–12)
NEUTROPHILS # BLD AUTO: 5.99 THOUSANDS/ΜL (ref 1.85–7.62)
NEUTS SEG NFR BLD AUTO: 75 % (ref 43–75)
NRBC BLD AUTO-RTO: 0 /100 WBCS
PHOSPHATE SERPL-MCNC: 3.2 MG/DL (ref 2.3–4.1)
PLATELET # BLD AUTO: 162 THOUSANDS/UL (ref 149–390)
PMV BLD AUTO: 10.9 FL (ref 8.9–12.7)
POTASSIUM SERPL-SCNC: 4 MMOL/L (ref 3.5–5.3)
PROCALCITONIN SERPL-MCNC: 0.47 NG/ML
PROT PATTERN SERPL ELPH-IMP: ABNORMAL
PROT PATTERN UR ELPH-IMP: ABNORMAL
PROT SERPL-MCNC: 5.8 G/DL (ref 6.4–8.2)
PROT SERPL-MCNC: 6 G/DL (ref 6.4–8.2)
PROT UR-MCNC: 59 MG/DL
RBC # BLD AUTO: 2.73 MILLION/UL (ref 3.88–5.62)
SODIUM SERPL-SCNC: 141 MMOL/L (ref 136–145)
VANCOMYCIN TROUGH SERPL-MCNC: 13.2 UG/ML (ref 10–20)
WBC # BLD AUTO: 8.03 THOUSAND/UL (ref 4.31–10.16)
WBC SPEC QL GRAM STN: NORMAL

## 2020-03-14 PROCEDURE — 80202 ASSAY OF VANCOMYCIN: CPT | Performed by: INTERNAL MEDICINE

## 2020-03-14 PROCEDURE — 85025 COMPLETE CBC W/AUTO DIFF WBC: CPT | Performed by: INTERNAL MEDICINE

## 2020-03-14 PROCEDURE — 83735 ASSAY OF MAGNESIUM: CPT | Performed by: INTERNAL MEDICINE

## 2020-03-14 PROCEDURE — 99232 SBSQ HOSP IP/OBS MODERATE 35: CPT | Performed by: INTERNAL MEDICINE

## 2020-03-14 PROCEDURE — 80053 COMPREHEN METABOLIC PANEL: CPT | Performed by: INTERNAL MEDICINE

## 2020-03-14 PROCEDURE — 84100 ASSAY OF PHOSPHORUS: CPT | Performed by: INTERNAL MEDICINE

## 2020-03-14 PROCEDURE — 84145 PROCALCITONIN (PCT): CPT | Performed by: INTERNAL MEDICINE

## 2020-03-14 PROCEDURE — 82948 REAGENT STRIP/BLOOD GLUCOSE: CPT

## 2020-03-14 RX ADMIN — Medication 1 CAPSULE: at 17:19

## 2020-03-14 RX ADMIN — ASPIRIN 81 MG 81 MG: 81 TABLET ORAL at 09:07

## 2020-03-14 RX ADMIN — HEPARIN SODIUM 5000 UNITS: 5000 INJECTION INTRAVENOUS; SUBCUTANEOUS at 05:18

## 2020-03-14 RX ADMIN — INSULIN LISPRO 2 UNITS: 100 INJECTION, SOLUTION INTRAVENOUS; SUBCUTANEOUS at 11:25

## 2020-03-14 RX ADMIN — INSULIN LISPRO 2 UNITS: 100 INJECTION, SOLUTION INTRAVENOUS; SUBCUTANEOUS at 21:31

## 2020-03-14 RX ADMIN — PIPERACILLIN AND TAZOBACTAM 2.25 G: 2; .25 INJECTION, POWDER, FOR SOLUTION INTRAVENOUS at 09:07

## 2020-03-14 RX ADMIN — VITAMIN D, TAB 1000IU (100/BT) 1000 UNITS: 25 TAB at 09:07

## 2020-03-14 RX ADMIN — VANCOMYCIN HYDROCHLORIDE 750 MG: 750 INJECTION, POWDER, LYOPHILIZED, FOR SOLUTION INTRAVENOUS at 14:23

## 2020-03-14 RX ADMIN — VANCOMYCIN HYDROCHLORIDE 125 MG: 500 INJECTION, POWDER, LYOPHILIZED, FOR SOLUTION INTRAVENOUS at 11:25

## 2020-03-14 RX ADMIN — HEPARIN SODIUM 5000 UNITS: 5000 INJECTION INTRAVENOUS; SUBCUTANEOUS at 14:23

## 2020-03-14 RX ADMIN — INSULIN DETEMIR 10 UNITS: 100 INJECTION, SOLUTION SUBCUTANEOUS at 21:31

## 2020-03-14 RX ADMIN — HEPARIN SODIUM 5000 UNITS: 5000 INJECTION INTRAVENOUS; SUBCUTANEOUS at 21:31

## 2020-03-14 RX ADMIN — PIPERACILLIN AND TAZOBACTAM 2.25 G: 2; .25 INJECTION, POWDER, FOR SOLUTION INTRAVENOUS at 00:34

## 2020-03-14 RX ADMIN — INSULIN LISPRO 2 UNITS: 100 INJECTION, SOLUTION INTRAVENOUS; SUBCUTANEOUS at 17:20

## 2020-03-14 RX ADMIN — VANCOMYCIN HYDROCHLORIDE 125 MG: 500 INJECTION, POWDER, LYOPHILIZED, FOR SOLUTION INTRAVENOUS at 17:19

## 2020-03-14 RX ADMIN — VANCOMYCIN HYDROCHLORIDE 125 MG: 500 INJECTION, POWDER, LYOPHILIZED, FOR SOLUTION INTRAVENOUS at 05:19

## 2020-03-14 RX ADMIN — SODIUM CHLORIDE, SODIUM GLUCONATE, SODIUM ACETATE, POTASSIUM CHLORIDE, MAGNESIUM CHLORIDE, SODIUM PHOSPHATE, DIBASIC, AND POTASSIUM PHOSPHATE 50 ML/HR: .53; .5; .37; .037; .03; .012; .00082 INJECTION, SOLUTION INTRAVENOUS at 12:48

## 2020-03-14 RX ADMIN — PIPERACILLIN AND TAZOBACTAM 2.25 G: 2; .25 INJECTION, POWDER, FOR SOLUTION INTRAVENOUS at 17:20

## 2020-03-14 NOTE — ASSESSMENT & PLAN NOTE
Malnutrition Findings:   Malnutrition type: Chronic illness  Degree of Malnutrition: Other severe protein calorie malnutrition    BMI Findings: Body mass index is 22 89 kg/m²       · Continue nutritional supplements per the dietitian's recommendations

## 2020-03-14 NOTE — PLAN OF CARE
Problem: Potential for Falls  Goal: Patient will remain free of falls  Description  INTERVENTIONS:  - Assess patient frequently for physical needs  -  Identify cognitive and physical deficits and behaviors that affect risk of falls  (fatigue, weakness)  -  [place] fall precautions as indicated by assessment  (high fall risk)  - Educate patient/family on patient safety including physical limitations  - Instruct patient to call for assistance with activity based on assessment  - Modify environment to reduce risk of injury  - Consider OT/PT consult to assist with strengthening/mobility    Outcome: Progressing     Problem: Prexisting or High Potential for Compromised Skin Integrity  Goal: Skin integrity is maintained or improved  Description  INTERVENTIONS:  - Identify patients at risk for skin breakdown  - Assess and monitor skin integrity  - Assess and monitor nutrition and hydration status  - Monitor labs   - Assess for incontinence   - Turn and reposition patient (every 2 hours and prn)  - Assist with mobility/ambulation (mod to max assist and walker)  - Relieve pressure over bony prominences  - Avoid friction and shearing  - Provide appropriate hygiene as needed including keeping skin clean and dry  - Evaluate need for skin moisturizer/barrier cream  - Collaborate with interdisciplinary team   - Patient/family teaching  - Consider wound care consult    Outcome: Progressing     Problem: Nutrition/Hydration-ADULT  Goal: Nutrient/Hydration intake appropriate for improving, restoring or maintaining nutritional needs  Description  Monitor and assess patient's nutrition/hydration status for malnutrition  Collaborate with interdisciplinary team and initiate plan and interventions as ordered  Monitor patient's weight and dietary intake as ordered or per policy  Utilize nutrition screening tool and intervene as necessary  Determine patient's food preferences and provide high-protein, high-caloric foods as appropriate  INTERVENTIONS:  - Monitor oral intake, urinary output, labs, and treatment plans  - Assess nutrition and hydration status and recommend course of action  - Evaluate amount of meals eaten  - Assist patient with eating if necessary   - Allow adequate time for meals  - Recommend/ encourage appropriate diets, oral nutritional supplements, and vitamin/mineral supplements  - Order, calculate, and assess calorie counts as needed  - Assess need for intravenous fluids  - Provide specific nutrition/hydration education as appropriate  - Include patient/family/caregiver in decisions related to nutrition   Outcome: Progressing     Problem: DISCHARGE PLANNING - CARE MANAGEMENT  Goal: Discharge to post-acute care or home with appropriate resources  Description  INTERVENTIONS:  - Conduct assessment to determine patient/family and health care team treatment goals, and need for post-acute services based on payer coverage, community resources, and patient preferences, and barriers to discharge  - Address psychosocial, clinical, and financial barriers to discharge as identified in assessment in conjunction with the patient/family and health care team  - Arrange appropriate level of post-acute services according to patient's   needs and preference and payer coverage in collaboration with the physician and health care team  - Communicate with and update the patient/family, physician, and health care team regarding progress on the discharge plan  - Arrange appropriate transportation to post-acute venues  -? STR vs home with homecare/home PT and OP follow up    Outcome: Progressing     Problem: PAIN - ADULT  Goal: Verbalizes/displays adequate comfort level or baseline comfort level  Description  Interventions:  - Encourage patient to monitor pain and request assistance  - Assess pain using appropriate pain scale (0-10 pain scale)  - Administer analgesics based on type and severity of pain and evaluate response  - Implement non-pharmacological measures as appropriate and evaluate response  - Consider cultural and social influences on pain and pain management  - Notify physician/advanced practitioner if interventions unsuccessful or patient reports new pain   Outcome: Progressing     Problem: INFECTION - ADULT  Goal: Absence or prevention of progression during hospitalization  Description  INTERVENTIONS:  - Assess and monitor for signs and symptoms of infection  - Monitor lab/diagnostic results  - Monitor all insertion sites, i e  indwelling lines  - Administer medications as ordered  - Instruct and encourage patient and family to use good hand hygiene technique  - Identify and instruct in appropriate isolation precautions for identified infection/condition (contact precautions)   Outcome: Progressing     Problem: SAFETY ADULT  Goal: Maintain or return to baseline ADL function  Description  INTERVENTIONS:  -  Assess patient's ability to carry out ADLs; (dependent for cares)  - Assess/evaluate cause of self-care deficits (fatigue, weakness)  - Assess range of motion  - Assess patient's mobility; (mod to max assist and walker)  - Assess patient's need for assistive devices and provide as appropriate (walker)  - Encourage maximum independence but intervene and supervise when necessary  - Involve family in performance of ADLs  - Assess for home care needs following discharge   - Consider OT consult to assist with ADL evaluation and planning for discharge  - Provide patient education as appropriate   Outcome: Progressing  Goal: Maintain or return mobility status to optimal level  Description  INTERVENTIONS:  - Assess patient's baseline mobility status (walker and assistance from spouse)    - Identify cognitive and physical deficits and behaviors that affect mobility (fatigue, weakness)  - Identify mobility aids required to assist with transfers and/or ambulation (gait belt, walker)  - [place] fall precautions as indicated by assessment (high fall risk)  - Record patient progress and toleration of activity level on Mobility SBAR; progress patient to next Phase/Stage  - Instruct patient to call for assistance with activity based on assessment  - Consider rehabilitation consult to assist with strengthening/weightbearing, etc    Outcome: Progressing     Problem: DISCHARGE PLANNING  Goal: Discharge to home or other facility with appropriate resources  Description  INTERVENTIONS:  - Identify barriers to discharge w/patient and caregiver  - Arrange for needed discharge resources and transportation as appropriate  - Identify discharge learning needs (meds, wound care, etc )  - Refer to Case Management Department for coordinating discharge planning if the patient needs post-hospital services based on physician/advanced practitioner order or complex needs related to functional status, cognitive ability, or social support system   Outcome: Progressing     Problem: Knowledge Deficit  Goal: Patient/family/caregiver demonstrates understanding of disease process, treatment plan, medications, and discharge instructions  Description  Complete learning assessment and assess knowledge base    Interventions:  - Provide teaching at level of understanding  - Provide teaching via preferred learning methods  Outcome: Progressing     Problem: GENITOURINARY - ADULT  Goal: Urinary catheter remains patent  Description  INTERVENTIONS:  - Assess patency of urinary catheter  - If patient has a chronic garcia, consider changing catheter if non-functioning (garcia is functioning)  - Follow guidelines for intermittent irrigation of non-functioning urinary catheter   Outcome: Progressing     Problem: METABOLIC, FLUID AND ELECTROLYTES - ADULT  Goal: Electrolytes maintained within normal limits  Description  INTERVENTIONS:  - Monitor labs and assess patient for signs and symptoms of electrolyte imbalances  - Administer electrolyte replacement as ordered  - Monitor response to electrolyte replacements, including repeat lab results as appropriate  - Instruct patient on fluid and nutrition as appropriate  Outcome: Progressing  Goal: Fluid balance maintained  Description  INTERVENTIONS:  - Monitor labs   - Monitor I/O and WT  - Instruct patient on fluid and nutrition as appropriate  - Assess for signs & symptoms of volume excess or deficit  Outcome: Progressing  Goal: Glucose maintained within target range  Description  INTERVENTIONS:  - Monitor Blood Glucose as ordered  - Assess for signs and symptoms of hyperglycemia and hypoglycemia  - Administer ordered medications to maintain glucose within target range  - Assess nutritional intake and initiate nutrition service referral as needed  Outcome: Progressing

## 2020-03-14 NOTE — ASSESSMENT & PLAN NOTE
· Associated with indwelling Halie catheter  · Continue IV zosyn and IV vancomycin based on the urine culture results    Urine culture [[id number]] (Abnormal)  Collected: 03/11/20 1143   Lab Status: Preliminary result Specimen: Urine, Indwelling Haile Catheter Updated: 03/13/20 1135    Urine Culture >100,000 cfu/ml Escherichia coli ESBLAbnormal     Comment: An Extended-Spectrum Beta-Lactamase is being produced by this organism (requires contact precautions)  Cephalosporins are NOT effective for treating these organisms  For SEVERE infections (i e  bacteremia, septic shock, etc ), Carbapenems are the drug of choice  For MILD infections (i e  isolated urinary or biliary infection), high-dose Zosyn may be used          10,000-19,000 cfu/ml Enterococcus faecalisAbnormal    Susceptibility     Escherichia coli ESBL (1)     Antibiotic Interpretation Microscan Method Status    Amikacin ($$) Susceptible <=16 ug/ml LUIS Preliminary    Amoxicillin + Clavulanate Susceptible 8/4 ug/ml LUIS Preliminary    Ampicillin ($$) Resistant >16 00 ug/ml LUIS Preliminary    Ampicillin + Sulbactam ($) Resistant >16/8 ug/ml LUIS Preliminary    Aztreonam ($$$)  Resistant >16 ug/ml LUIS Preliminary    Cefazolin ($) Resistant >16 00 ug/ml LUIS Preliminary    Cefepime ($) Resistant >16 00 ug/ml LUIS Preliminary    Cefotaxime ($) Resistant >32 00 ug/ml LUIS Preliminary    Ceftazidime ($$) Resistant >16 ug/ml LUIS Preliminary    Ceftriaxone ($$) Resistant >32 00 ug/ml LUIS Preliminary    Cefuroxime ($$) Resistant >16 ug/ml LUIS Preliminary    Ciprofloxacin ($)  Resistant >2 00 ug/ml LUIS Preliminary    Ertapenem ($$$) Susceptible <=0 5 ug/ml LUIS Preliminary    Gentamicin ($$) Resistant >8 ug/ml LUIS Preliminary    Levofloxacin ($) Resistant >4 00 ug/ml LUIS Preliminary    Nitrofurantoin Resistant >64 ug/ml LUIS Preliminary    Piperacillin + Tazobactam ($$$) Susceptible <=4 ug/ml LUIS Preliminary    Tetracycline Susceptible <=4 ug/ml LUIS Preliminary Tobramycin ($) Intermediate 8 ug/ml LUIS Preliminary    Trimethoprim + Sulfamethoxazole ($$$) Susceptible <=2/38 ug/ml LUIS Preliminary    Enterococcus faecalis (2)     Antibiotic Interpretation Microscan Method Status    Ampicillin ($$) Susceptible <=2 00 ug/ml LUIS Preliminary    Levofloxacin ($) Resistant >4 00 ug/ml LUIS Preliminary    Nitrofurantoin Susceptible <=32 ug/ml LUIS Preliminary    Tetracycline Resistant >8 ug/ml LUIS Preliminary    Vancomycin ($) Susceptible 1 00 ug/ml LUIS Preliminary

## 2020-03-14 NOTE — ASSESSMENT & PLAN NOTE
· Urinary tract infection associated with indwelling Haile catheter  · Continue IV zosyn and IV vancomycin based on the urine culture results    Urine culture [[id number]] (Abnormal)  Collected: 03/11/20 1143   Lab Status: Preliminary result Specimen: Urine, Indwelling Haile Catheter Updated: 03/13/20 1135    Urine Culture >100,000 cfu/ml Escherichia coli ESBLAbnormal     Comment: An Extended-Spectrum Beta-Lactamase is being produced by this organism (requires contact precautions)  Cephalosporins are NOT effective for treating these organisms  For SEVERE infections (i e  bacteremia, septic shock, etc ), Carbapenems are the drug of choice  For MILD infections (i e  isolated urinary or biliary infection), high-dose Zosyn may be used          10,000-19,000 cfu/ml Enterococcus faecalisAbnormal    Susceptibility     Escherichia coli ESBL (1)     Antibiotic Interpretation Microscan Method Status    Amikacin ($$) Susceptible <=16 ug/ml LUIS Preliminary    Amoxicillin + Clavulanate Susceptible 8/4 ug/ml LUIS Preliminary    Ampicillin ($$) Resistant >16 00 ug/ml LUIS Preliminary    Ampicillin + Sulbactam ($) Resistant >16/8 ug/ml LUIS Preliminary    Aztreonam ($$$)  Resistant >16 ug/ml LUIS Preliminary    Cefazolin ($) Resistant >16 00 ug/ml LUIS Preliminary    Cefepime ($) Resistant >16 00 ug/ml LUIS Preliminary    Cefotaxime ($) Resistant >32 00 ug/ml LUIS Preliminary    Ceftazidime ($$) Resistant >16 ug/ml LUIS Preliminary    Ceftriaxone ($$) Resistant >32 00 ug/ml LUIS Preliminary    Cefuroxime ($$) Resistant >16 ug/ml LUIS Preliminary    Ciprofloxacin ($)  Resistant >2 00 ug/ml LUIS Preliminary    Ertapenem ($$$) Susceptible <=0 5 ug/ml LUIS Preliminary    Gentamicin ($$) Resistant >8 ug/ml LUIS Preliminary    Levofloxacin ($) Resistant >4 00 ug/ml LUIS Preliminary    Nitrofurantoin Resistant >64 ug/ml LUIS Preliminary    Piperacillin + Tazobactam ($$$) Susceptible <=4 ug/ml LUIS Preliminary    Tetracycline Susceptible <=4 ug/ml LUIS Preliminary    Tobramycin ($) Intermediate 8 ug/ml LUIS Preliminary    Trimethoprim + Sulfamethoxazole ($$$) Susceptible <=2/38 ug/ml LUIS Preliminary    Enterococcus faecalis (2)     Antibiotic Interpretation Microscan Method Status    Ampicillin ($$) Susceptible <=2 00 ug/ml LUIS Preliminary    Levofloxacin ($) Resistant >4 00 ug/ml LUIS Preliminary    Nitrofurantoin Susceptible <=32 ug/ml LUIS Preliminary    Tetracycline Resistant >8 ug/ml LUIS Preliminary    Vancomycin ($) Susceptible 1 00 ug/ml LUIS Preliminary

## 2020-03-14 NOTE — ASSESSMENT & PLAN NOTE
· Sepsis was present on admission and secondary to possible acute cystitis associated with an indwelling Haile catheter  · SIRS criteria was met with hypothermia and a leukocytosis  · Continue IV zosyn and IV vancomycin based on the urine culture results  · Serial laboratory testing to monitor the patient's renal function while on the combination of IV zosyn and IV vancomycin  · Continue IV fluids with isolyte at 50 ml/hr  · Follow culture results  · The lactic acid level was within normal limits    I discussed goals of care with the patient, and the patient wishes to be a Level 1-Full Code

## 2020-03-14 NOTE — ASSESSMENT & PLAN NOTE
· Episode in the [facility] while receiving lanreotide infusion  · Telemetry montioring   · Troponin levels x 3 sets were within normal limits    Echo complete with contrast if indicated   Status: Final result   PACS Images      Show images for Echo complete with contrast if indicated   Study Result     [address]  [address], [place] [address]  [phone]     Transthoracic Echocardiogram  2D, M-mode, Doppler, and Color Doppler     Study date:  12-Mar-2020     Patient: Bishop Gaytan  MR number: [id number]  Account number: [de-identified]  : 1940  Age: 79 years  Gender: Male  Status: Inpatient  Location: Bedside  Height: 68 in  Weight: 146 lb  BP: 149/ 72 mmHg     Indications: syncope, sepsis     Diagnoses: A41 9 - Sepsis, unspecified, R55  - Syncope and collapse     Sonographer:  Claudia Blum RDCS  Referring Physician:  Anna Sun DO  Group:  [facility]  Interpreting Physician:  Jane Grewal DO     SUMMARY     LEFT VENTRICLE:  Systolic function was normal  Ejection fraction was estimated to be 60 %  Although no diagnostic regional wall motion abnormality was identified, this possibility cannot be completely excluded on the basis of this study  Doppler parameters were consistent with abnormal left ventricular relaxation (grade 1 diastolic dysfunction)      TRICUSPID VALVE:  There was mild regurgitation      HISTORY: PRIOR HISTORY: diabetes mellitus, chronic kidney disease, mixed hyperlipidemia, hypertension     PROCEDURE: The procedure was performed at the bedside  This was a routine study  The transthoracic approach was used  The study included complete 2D imaging, M-mode, complete spectral Doppler, and color Doppler  Images were obtained from  the parasternal, apical, subcostal, and suprasternal notch acoustic windows   Echocardiographic views were limited due to poor acoustic window availability, decreased penetration, and lung interference  This was a technically difficult  study      LEFT VENTRICLE: Size was normal  Systolic function was normal  Ejection fraction was estimated to be 60 %  Although no diagnostic regional wall motion abnormality was identified, this possibility cannot be completely excluded on the basis  of this study  Wall thickness was normal  DOPPLER: Doppler parameters were consistent with abnormal left ventricular relaxation (grade 1 diastolic dysfunction)      RIGHT VENTRICLE: The size was normal  Systolic function was normal  Wall thickness was normal      LEFT ATRIUM: Size was normal      RIGHT ATRIUM: Size was normal      MITRAL VALVE: Valve structure was normal  There was normal leaflet separation  DOPPLER: The transmitral velocity was within the normal range  There was no evidence for stenosis  There was no regurgitation      AORTIC VALVE: The valve was trileaflet  Leaflets exhibited normal thickness and normal cuspal separation  DOPPLER: Transaortic velocity was within the normal range  There was no evidence for stenosis  There was no regurgitation      TRICUSPID VALVE: The valve structure was normal  There was normal leaflet separation  DOPPLER: The transtricuspid velocity was within the normal range  There was no evidence for stenosis  There was mild regurgitation      PULMONIC VALVE: Leaflets exhibited normal thickness, no calcification, and normal cuspal separation  DOPPLER: The transpulmonic velocity was within the normal range  There was no regurgitation      PERICARDIUM: There was no pericardial effusion   The pericardium was normal in appearance      AORTA: The root exhibited normal size      SYSTEMIC VEINS: IVC: The inferior vena cava was not well visualized      SYSTEM MEASUREMENT TABLES     2D  %FS: 42 08 %  Ao Diam: 3 14 cm  EDV(Teich): 101 7 ml  EF(Teich): 73 08 %  ESV(Teich): 27 38 ml  IVSd: 1 07 cm  LA Area: 17 16 cm2  LA Diam: 3 71 cm  LVEDV MOD A4C: 76 23 ml  LVEF MOD A4C: 65 07 %  LVESV MOD A4C: 26 63 ml  LVIDd: 4 69 cm  LVIDs: 2 71 cm  LVLd A4C: 7 68 cm  LVLs A4C: 6 83 cm  LVOT Diam: 2 03 cm  LVPWd: 1 16 cm  RA Area: 12 3 cm2  RVIDd: 2 57 cm  RWT: 0 5  SV MOD A4C: 49 6 ml  SV(Teich): 74 32 ml     CW  AV Vmax: 1 48 m/s  AV maxP 83 mmHg  PV Vmax: 1 29 m/s  PV maxP 67 mmHg  TR Vmax: 2 93 m/s  TR maxP 44 mmHg     MM  TAPSE: 2 6 cm     PW  PARIS Vmax, Pt: 1 97 cm2  E' Av 06 m/s  E' Lat: 0 07 m/s  E' Sept: 0 04 m/s  E/E' Av 89  E/E' Lat: 10 6  E/E' Sept: 16 42  LVOT Vmax: 0 9 m/s  LVOT maxPG: 3 22 mmHg  MV A [place]: 1 14 m/s  MV Dec [place]: 2 51 m/s2  MV DecT: 287 61 ms  MV E [place]: 0 72 m/s  MV E/A Ratio: 0 63  RVOT Vmax: 0 7 m/s  RVOT maxP 99 mmHg     [facility] Accredited Echocardiography Laboratory     Prepared and electronically signed by  Jil Pratt DO  Signed 12-Mar-2020 15:54:24

## 2020-03-14 NOTE — ASSESSMENT & PLAN NOTE
· Recheck a TSH level in 1-2 weeks with the patient's PCP after discharge    Results for Priya Six (MRN [id number]) as of 3/13/2020 16:28   Ref  Range 3/13/2020 05:26   Free T4 Latest Ref Range: 0 76 - 1 46 ng/dL 1 08       Results for Priya Six (MRN [id number]) as of 3/12/2020 21:31   Ref   Range 3/12/2020 05:49   TSH 3RD GENERATON Latest Ref Range: 0 358 - 3 740 uIU/mL 0 205 (L)

## 2020-03-14 NOTE — ASSESSMENT & PLAN NOTE
Lab Results   Component Value Date    HGBA1C 6 6 (H) 03/12/2020       Recent Labs     03/13/20  2108 03/14/20  0746 03/14/20  0829 03/14/20  1124   POCGLU 366* 48* 137 258*       Blood Sugar Average: Last 72 hrs:  (P) 223 [id number]     · Hypoglycemia this morning  · Decrease levemir to 10 Units SQ QHS  · Hypoglycemia protocol  · Follow the blood glucose trend

## 2020-03-14 NOTE — ASSESSMENT & PLAN NOTE
· Recheck Clostridium difficile stool culture with the patient having sepsis  · Utilize vancomycin 125 mg PO every 6 hours    Clostridium difficile toxin by PCR with EIA [[id number]] (Abnormal) Collected: 03/03/20 2057   Lab Status: Final result Specimen: Stool from Per Rectum Updated: 03/04/20 1429    C difficile toxin by PCR PositiveAbnormal     Comment: Result suggests infection or colonization with C  difficile  EIA testing has been performed to clarify  Maintain strict contact and hand hygiene precautions  C difficile Toxins A+B, EIA Negative    Comment: Probable C  difficile colonization without active infection  Treatment recommended if severe diarrhea without alternate etiology  Maintain strict contact and hand hygiene precautions  This is an appended report  Coleman De Jesus results have been appended to a previously preliminary verified report

## 2020-03-14 NOTE — PROGRESS NOTES
Progress Note - Chloe Edouard 1940, 79 y o  male MRN: [id number]    Unit/Bed#: 415-01 Encounter: [id number]    Primary Care Provider: Naren Crawford DO   Date and time admitted to hospital: 3/11/2020  9:12 AM        * Sepsis [facility])  Assessment & Plan  · Sepsis was present on admission and secondary to possible acute cystitis associated with an indwelling Haile catheter  · SIRS criteria was met with hypothermia and a leukocytosis  · Continue IV zosyn and IV vancomycin based on the urine culture results  · Serial laboratory testing to monitor the patient's renal function while on the combination of IV zosyn and IV vancomycin  · Continue IV fluids with isolyte at 50 ml/hr  · Follow culture results  · The lactic acid level was within normal limits    I discussed goals of care with the patient, and the patient wishes to be a Level 1-Full Code  Acute cystitis with hematuria  Assessment & Plan  · Associated with indwelling Haile catheter  · Continue IV zosyn and IV vancomycin based on the urine culture results    Urine culture [[id number]] (Abnormal)  Collected: 03/11/20 1143   Lab Status: Preliminary result Specimen: Urine, Indwelling Ahile Catheter Updated: 03/13/20 1135    Urine Culture >100,000 cfu/ml Escherichia coli ESBLAbnormal     Comment: An Extended-Spectrum Beta-Lactamase is being produced by this organism (requires contact precautions)  Cephalosporins are NOT effective for treating these organisms  For SEVERE infections (i e  bacteremia, septic shock, etc ), Carbapenems are the drug of choice  For MILD infections (i e  isolated urinary or biliary infection), high-dose Zosyn may be used          10,000-19,000 cfu/ml Enterococcus faecalisAbnormal    Susceptibility     Escherichia coli ESBL (1)     Antibiotic Interpretation Microscan Method Status    Amikacin ($$) Susceptible <=16 ug/ml LUIS Preliminary    Amoxicillin + Clavulanate Susceptible 8/4 ug/ml LUIS Preliminary    Ampicillin ($$) Resistant >16 00 ug/ml LUIS Preliminary    Ampicillin + Sulbactam ($) Resistant >16/8 ug/ml LUIS Preliminary    Aztreonam ($$$)  Resistant >16 ug/ml LUIS Preliminary    Cefazolin ($) Resistant >16 00 ug/ml LUIS Preliminary    Cefepime ($) Resistant >16 00 ug/ml LUIS Preliminary    Cefotaxime ($) Resistant >32 00 ug/ml LUIS Preliminary    Ceftazidime ($$) Resistant >16 ug/ml LUIS Preliminary    Ceftriaxone ($$) Resistant >32 00 ug/ml LUIS Preliminary    Cefuroxime ($$) Resistant >16 ug/ml LUIS Preliminary    Ciprofloxacin ($)  Resistant >2 00 ug/ml LUIS Preliminary    Ertapenem ($$$) Susceptible <=0 5 ug/ml LUIS Preliminary    Gentamicin ($$) Resistant >8 ug/ml LUIS Preliminary    Levofloxacin ($) Resistant >4 00 ug/ml LUIS Preliminary    Nitrofurantoin Resistant >64 ug/ml LUIS Preliminary    Piperacillin + Tazobactam ($$$) Susceptible <=4 ug/ml LUIS Preliminary    Tetracycline Susceptible <=4 ug/ml LUIS Preliminary    Tobramycin ($) Intermediate 8 ug/ml LUIS Preliminary    Trimethoprim + Sulfamethoxazole ($$$) Susceptible <=2/38 ug/ml LUIS Preliminary    Enterococcus faecalis (2)     Antibiotic Interpretation Microscan Method Status    Ampicillin ($$) Susceptible <=2 00 ug/ml LUIS Preliminary    Levofloxacin ($) Resistant >4 00 ug/ml LUIS Preliminary    Nitrofurantoin Susceptible <=32 ug/ml LUIS Preliminary    Tetracycline Resistant >8 ug/ml LUIS Preliminary    Vancomycin ($) Susceptible 1 00 ug/ml LUIS Preliminary                Syncope and collapse  Assessment & Plan  · Episode in the [facility] while receiving lanreotide infusion  · Telemetry montioring   · Troponin levels x 3 sets were within normal limits    Echo complete with contrast if indicated   Status: Final result   PACS Images      Show images for Echo complete with contrast if indicated   Study Result     [address] [facility] [address] [address]  [phone]     Transthoracic Echocardiogram  2D, M-mode, Doppler, and Color Doppler     Study date:  12-Mar-2020     Patient: Percy Closs  MR number: [id number]  Account number: [de-identified]  : 1940  Age: 79 years  Gender: Male  Status: Inpatient  Location: Bedside  Height: 68 in  Weight: 146 lb  BP: 149/ 72 mmHg     Indications: syncope, sepsis     Diagnoses: A41 9 - Sepsis, unspecified, R55  - Syncope and collapse     Sonographer:  Gab Snyder RDCS  Referring Physician:  Noelle Bergeron DO  Group:  [facility]  Interpreting Physician:  Jayme Dumont DO     SUMMARY     LEFT VENTRICLE:  Systolic function was normal  Ejection fraction was estimated to be 60 %  Although no diagnostic regional wall motion abnormality was identified, this possibility cannot be completely excluded on the basis of this study  Doppler parameters were consistent with abnormal left ventricular relaxation (grade 1 diastolic dysfunction)      TRICUSPID VALVE:  There was mild regurgitation      HISTORY: PRIOR HISTORY: diabetes mellitus, chronic kidney disease, mixed hyperlipidemia, hypertension     PROCEDURE: The procedure was performed at the bedside  This was a routine study  The transthoracic approach was used  The study included complete 2D imaging, M-mode, complete spectral Doppler, and color Doppler  Images were obtained from  the parasternal, apical, subcostal, and suprasternal notch acoustic windows  Echocardiographic views were limited due to poor acoustic window availability, decreased penetration, and lung interference  This was a technically difficult  study      LEFT VENTRICLE: Size was normal  Systolic function was normal  Ejection fraction was estimated to be 60 %  Although no diagnostic regional wall motion abnormality was identified, this possibility cannot be completely excluded on the basis  of this study   Wall thickness was normal  DOPPLER: Doppler parameters were consistent with abnormal left ventricular relaxation (grade 1 diastolic dysfunction)      RIGHT VENTRICLE: The size was normal  Systolic function was normal  Wall thickness was normal      LEFT ATRIUM: Size was normal      RIGHT ATRIUM: Size was normal      MITRAL VALVE: Valve structure was normal  There was normal leaflet separation  DOPPLER: The transmitral velocity was within the normal range  There was no evidence for stenosis  There was no regurgitation      AORTIC VALVE: The valve was trileaflet  Leaflets exhibited normal thickness and normal cuspal separation  DOPPLER: Transaortic velocity was within the normal range  There was no evidence for stenosis  There was no regurgitation      TRICUSPID VALVE: The valve structure was normal  There was normal leaflet separation  DOPPLER: The transtricuspid velocity was within the normal range  There was no evidence for stenosis  There was mild regurgitation      PULMONIC VALVE: Leaflets exhibited normal thickness, no calcification, and normal cuspal separation  DOPPLER: The transpulmonic velocity was within the normal range  There was no regurgitation      PERICARDIUM: There was no pericardial effusion   The pericardium was normal in appearance      AORTA: The root exhibited normal size      SYSTEMIC VEINS: IVC: The inferior vena cava was not well visualized      SYSTEM MEASUREMENT TABLES     2D  %FS: 42 08 %  Ao Diam: 3 14 cm  EDV(Teich): 101 7 ml  EF(Teich): 73 08 %  ESV(Teich): 27 38 ml  IVSd: 1 07 cm  LA Area: 17 16 cm2  LA Diam: 3 71 cm  LVEDV MOD A4C: 76 23 ml  LVEF MOD A4C: 65 07 %  LVESV MOD A4C: 26 63 ml  LVIDd: 4 69 cm  LVIDs: 2 71 cm  LVLd A4C: 7 68 cm  LVLs A4C: 6 83 cm  LVOT Diam: 2 03 cm  LVPWd: 1 16 cm  RA Area: 12 3 cm2  RVIDd: 2 57 cm  RWT: 0 5  SV MOD A4C: 49 6 ml  SV(Teich): 74 32 ml     CW  AV Vmax: 1 48 m/s  AV maxP 83 mmHg  PV Vmax: 1 29 m/s  PV maxP 67 mmHg  TR Vmax: 2 93 m/s  TR maxP 44 mmHg     MM  TAPSE: 2 6 cm     PW  PARIS Vmax, Pt: 1 97 cm2  E' Av 06 m/s  E' Lat: 0 07 m/s  E' Sept: 0 04 m/s  E/E' Av 89  E/E' Lat: 10 6  E/E' Sept: 16 42  LVOT Vmax: 0 9 m/s  LVOT maxPG: 3 22 mmHg  MV A Antonio: 1 14 m/s  MV Dec [place]: 2 51 m/s2  MV DecT: 287 61 ms  MV E Antonio: 0 72 m/s  MV E/A Ratio: 0 63  RVOT Vmax: 0 7 m/s  RVOT maxP 99 mmHg     [facility]     Prepared and electronically signed by  Slim Almendarez DO  Signed 12-Mar-2020 15:54:24         Premature atrial complexes  Assessment & Plan  · Outpatient follow-up with Cardiology    Diastolic dysfunction  Assessment & Plan  · Monitor the patient's volume status closely    Severe protein-calorie malnutrition ([address] )  Assessment & Plan  Malnutrition Findings:   Malnutrition type: Chronic illness  Degree of Malnutrition: Other severe protein calorie malnutrition    BMI Findings: Body mass index is 22 89 kg/m²  · Continue nutritional supplements per the dietitian's recommendations    Low TSH level  Assessment & Plan  · Recheck a TSH level in 1-2 weeks with the patient's PCP after discharge    Results for Ria Mcleod (MRN [id number]) as of 3/13/2020 16:28   Ref  Range 3/13/2020 05:26   Free T4 Latest Ref Range: 0 76 - 1 46 ng/dL 1 08       Results for Ria Mcleod (MRN [id number]) as of 3/12/2020 21:31   Ref  Range 3/12/2020 05:49   TSH 3RD GENERATON Latest Ref Range: 0 358 - 3 740 uIU/mL 0 205 (L)       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  Assessment & Plan  · Continue vancomycin 125 mg PO every 6 hours    Clostridium difficile toxin by PCR with EIA [[id number]] (Abnormal) Collected: 20   Lab Status: Final result Specimen: Stool from Per Rectum Updated: 20 1429    C difficile toxin by PCR PositiveAbnormal     Comment: Result suggests infection or colonization with C  difficile  EIA testing has been performed to clarify  Maintain strict contact and hand hygiene precautions         C difficile Toxins A+B, EIA Negative    Comment: Probable C  difficile colonization without active infection  Treatment recommended if severe diarrhea without alternate etiology  Maintain strict contact and hand hygiene precautions  This is an appended report  Coleman De Jesus results have been appended to a previously preliminary verified report  Clostridium difficile toxin by PCR with EIA [[id number]] (Normal) Collected: 03/11/20 1910   Lab Status: Final result Specimen: Stool from Per Rectum Updated: 03/12/20 1127    C difficile toxin by PCR Negative    Comment: No evidence for C  difficile colonization or infection   No special contact precautions required  Elevated total protein  Assessment & Plan  · Likely secondary to volume depletion  · Resolved  · Follow the total protein level    Hyperkalemia  Assessment & Plan  · Hemolyzed specimen  · Resolved  · Follow the potassium level    Stage 4 chronic kidney disease (HCC)  Assessment & Plan  · Baseline serum creatinine of 1 9-2 4 mg/dl  · Avoid all nephrotoxic agents  · Serial laboratory testing to monitor the patient's renal function and electrolytes    Chronic indwelling Haile catheter  Assessment & Plan  · Urinary tract infection associated with indwelling Haile catheter  · Continue IV zosyn and IV vancomycin based on the urine culture results    Urine culture [[id number]] (Abnormal)  Collected: 03/11/20 1143   Lab Status: Preliminary result Specimen: Urine, Indwelling Haile Catheter Updated: 03/13/20 1135    Urine Culture >100,000 cfu/ml Escherichia coli ESBLAbnormal     Comment: An Extended-Spectrum Beta-Lactamase is being produced by this organism (requires contact precautions)  Cephalosporins are NOT effective for treating these organisms  For SEVERE infections (i e  bacteremia, septic shock, etc ), Carbapenems are the drug of choice  For MILD infections (i e  isolated urinary or biliary infection), high-dose Zosyn may be used          10,000-19,000 cfu/ml Enterococcus faecalisAbnormal    Susceptibility     Escherichia coli ESBL (1) Antibiotic Interpretation Microscan Method Status    Amikacin ($$) Susceptible <=16 ug/ml LUIS Preliminary    Amoxicillin + Clavulanate Susceptible 8/4 ug/ml LUIS Preliminary    Ampicillin ($$) Resistant >16 00 ug/ml LUIS Preliminary    Ampicillin + Sulbactam ($) Resistant >16/8 ug/ml LUIS Preliminary    Aztreonam ($$$)  Resistant >16 ug/ml LUIS Preliminary    Cefazolin ($) Resistant >16 00 ug/ml LUIS Preliminary    Cefepime ($) Resistant >16 00 ug/ml LUIS Preliminary    Cefotaxime ($) Resistant >32 00 ug/ml LUIS Preliminary    Ceftazidime ($$) Resistant >16 ug/ml LUIS Preliminary    Ceftriaxone ($$) Resistant >32 00 ug/ml LUIS Preliminary    Cefuroxime ($$) Resistant >16 ug/ml LUIS Preliminary    Ciprofloxacin ($)  Resistant >2 00 ug/ml LUIS Preliminary    Ertapenem ($$$) Susceptible <=0 5 ug/ml LUIS Preliminary    Gentamicin ($$) Resistant >8 ug/ml LUIS Preliminary    Levofloxacin ($) Resistant >4 00 ug/ml LUIS Preliminary    Nitrofurantoin Resistant >64 ug/ml LUIS Preliminary    Piperacillin + Tazobactam ($$$) Susceptible <=4 ug/ml LUIS Preliminary    Tetracycline Susceptible <=4 ug/ml LUIS Preliminary    Tobramycin ($) Intermediate 8 ug/ml LUIS Preliminary    Trimethoprim + Sulfamethoxazole ($$$) Susceptible <=2/38 ug/ml LUIS Preliminary    Enterococcus faecalis (2)     Antibiotic Interpretation Microscan Method Status    Ampicillin ($$) Susceptible <=2 00 ug/ml LUIS Preliminary    Levofloxacin ($) Resistant >4 00 ug/ml LUIS Preliminary    Nitrofurantoin Susceptible <=32 ug/ml LUIS Preliminary    Tetracycline Resistant >8 ug/ml LUIS Preliminary    Vancomycin ($) Susceptible 1 00 ug/ml LUIS Preliminary                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  · Receiving lanreotide infusions as an outpatient with Hematology/Oncology    Type 2 diabetes mellitus with hypoglycemia without coma, with long-term current use of insulin [facility])  Assessment & Plan  Lab Results   Component Value Date HGBA1C 6 6 (H) 2020       Recent Labs     20  2108 20  0746 20  0829 20  1124   POCGLU 366* 48* 137 258*       Blood Sugar Average: Last 72 hrs:  (P) 717 [id number]     · Hypoglycemia this morning  · Decrease levemir to 10 Units SQ QHS  · Hypoglycemia protocol  · Follow the blood glucose trend      VTE Pharmacologic Prophylaxis:   Pharmacologic: Heparin  Mechanical VTE Prophylaxis in Place: Yes    Patient Centered Rounds: I have performed bedside rounds with nursing staff today  Time Spent for Care: 30 minutes  More than 50% of total time spent on counseling and coordination of care as described above  Current Length of Stay: 3 day(s)    Current Patient Status: Inpatient   Certification Statement: The patient will continue to require additional inpatient hospital stay due to the need for IV antibiotic treatment and for continuous IV fluids  Code Status: Level 1 - Full Code      Subjective: The patient was seen and examined  The patient is doing better  The patient complains of generalized weakness  No chest pain  No shortness of breath  No abdominal pain  No nausea or vomiting  Objective:     Vitals:   Temp (24hrs), Av 4 °F (36 9 °C), Min:98 3 °F (36 8 °C), Max:98 5 °F (36 9 °C)    Temp:  [98 3 °F (36 8 °C)-98 5 °F (36 9 °C)] 98 3 °F (36 8 °C)  HR:  [70-80] 70  Resp:  [18] 18  BP: (142-149)/(65-68) 142/68  SpO2:  [97 %-98 %] 97 %  Body mass index is 22 89 kg/m²  Input and Output Summary (last 24 hours):        Intake/Output Summary (Last 24 hours) at 3/14/2020 1244  Last data filed at 3/14/2020 0845  Gross per 24 hour   Intake 1700 ml   Output 3025 ml   Net -1325 ml       Physical Exam:     Physical Exam  General:  NAD, follows commands  HEENT:  NC/AT, mucous membranes moist  Neck:  Supple, No JVP elevation  CV:  + S1, + S2, RRR  Pulm:  Lung fields are CTA bilaterally  Abd:  Soft, Non-tender, Non-distended  Ext:  No clubbing/cyanosis/edema  Skin:  No flaca  Neuro:  Awake, alert, oriented  Psych:  Normal mood and affect  :  Indwelling Haile catheter      Additional Data:    Labs:    Results from last 7 days   Lab Units 03/14/20  0504   WBC Thousand/uL 8 03   HEMOGLOBIN g/dL 8 3*   HEMATOCRIT % 25 3*   PLATELETS Thousands/uL 162   NEUTROS PCT % 75   LYMPHS PCT % 13*   MONOS PCT % 10   EOS PCT % 1     Results from last 7 days   Lab Units 03/14/20  0504   SODIUM mmol/L 141   POTASSIUM mmol/L 4 0   CHLORIDE mmol/L 107   CO2 mmol/L 21   BUN mg/dL 33*   CREATININE mg/dL 2 14*   ANION GAP mmol/L 13   CALCIUM mg/dL 8 6   ALBUMIN g/dL 2 1*   TOTAL BILIRUBIN mg/dL 0 40   ALK PHOS U/L 125*   ALT U/L 17   AST U/L 10   GLUCOSE RANDOM mg/dL 91         Results from last 7 days   Lab Units 03/14/20  1124 03/14/20  0829 03/14/20  0746 03/13/20  2108 03/13/20  1620 03/13/20  1203 03/13/20  0728 03/12/20  2033 03/12/20  1556 03/12/20  1119 03/12/20  0738 03/11/20  2055   POC GLUCOSE mg/dl 258* 137 48* 366* 299* 282* 146* 246* 234* 285* 98 255*     Results from last 7 days   Lab Units 03/12/20  0549   HEMOGLOBIN A1C % 6 6*     Results from last 7 days   Lab Units 03/13/20  0526 03/12/20  0549 03/11/20  1139   LACTIC ACID mmol/L  --  1 2 1 3   PROCALCITONIN ng/ml 0 70* 1 23*  --            * I Have Reviewed All Lab Data Listed Above  * Additional Pertinent Lab Tests Reviewed: Philingquincy 66 Admission Reviewed      Recent Cultures (last 7 days):     Results from last 7 days   Lab Units 03/11/20  1910 03/11/20  1143 03/11/20  1140 03/11/20  1139   BLOOD CULTURE   --   --  No Growth at 48 hrs  No Growth at 48 hrs     URINE CULTURE   --  >100,000 cfu/ml Escherichia coli ESBL*  10,000-19,000 cfu/ml Enterococcus faecalis*  50,000-59,000 cfu/ml Candida parapsilosis*  --   --    C DIFF TOXIN B  Negative  --   --   --        Last 24 Hours Medication List:     Current Facility-Administered Medications:  acetaminophen 650 mg Oral Q6H PRN Karena Hammer,     aspirin 81 mg Oral Daily Fransisco Serrano DO    b complex-vitamin C-folic acid 1 capsule Oral Daily With Siddhartha Jimenez DO    cholecalciferol 1,000 Units Oral Daily Fransisco Serrano DO    dextrose 25 mL Intravenous PRN Fransisco Serrano DO    heparin (porcine) 5,000 Units Subcutaneous Q8H 6225 Franklyn Dodson, DO    insulin detemir 10 Units Subcutaneous HS Fransisco Serrano, DO    insulin lispro 1-5 Units Subcutaneous TID AC Fransisco Serrano DO    insulin lispro 1-5 Units Subcutaneous HS Fransisco Serrano, DO    multi-electrolyte 50 mL/hr Intravenous Continuous Fransisco Serrano DO Last Rate: 75 mL/hr (03/13/20 2328)   piperacillin-tazobactam 2 25 g Intravenous Q8H Fransisco Serrano,  Last Rate: 2 25 g (03/14/20 0907)   vancomycin 10 mg/kg Intravenous Q24H Fransisco Serrano,  Last Rate: 750 mg (03/13/20 1401)   vancomycin 125 mg Oral Q6H 6225 Franklyn Dodson,          Today, Patient Was Seen By: Fransisco Serrano DO    ** Please Note: Dictation voice to text software may have been used in the creation of this document   **

## 2020-03-15 PROBLEM — R00.1 BRADYCARDIA: Status: ACTIVE | Noted: 2020-03-15

## 2020-03-15 LAB
ALBUMIN SERPL BCP-MCNC: 2.1 G/DL (ref 3.5–5)
ALP SERPL-CCNC: 145 U/L (ref 46–116)
ALT SERPL W P-5'-P-CCNC: 26 U/L (ref 12–78)
ANION GAP SERPL CALCULATED.3IONS-SCNC: 11 MMOL/L (ref 4–13)
AST SERPL W P-5'-P-CCNC: 17 U/L (ref 5–45)
BASOPHILS # BLD AUTO: 0.07 THOUSANDS/ΜL (ref 0–0.1)
BASOPHILS NFR BLD AUTO: 1 % (ref 0–1)
BILIRUB SERPL-MCNC: 0.4 MG/DL (ref 0.2–1)
BUN SERPL-MCNC: 29 MG/DL (ref 5–25)
CALCIUM SERPL-MCNC: 8.6 MG/DL (ref 8.3–10.1)
CHLORIDE SERPL-SCNC: 105 MMOL/L (ref 100–108)
CO2 SERPL-SCNC: 25 MMOL/L (ref 21–32)
CREAT SERPL-MCNC: 2.12 MG/DL (ref 0.6–1.3)
EOSINOPHIL # BLD AUTO: 0.2 THOUSAND/ΜL (ref 0–0.61)
EOSINOPHIL NFR BLD AUTO: 3 % (ref 0–6)
ERYTHROCYTE [DISTWIDTH] IN BLOOD BY AUTOMATED COUNT: 15.1 % (ref 11.6–15.1)
GFR SERPL CREATININE-BSD FRML MDRD: 28 ML/MIN/1.73SQ M
GLUCOSE SERPL-MCNC: 114 MG/DL (ref 65–140)
GLUCOSE SERPL-MCNC: 206 MG/DL (ref 65–140)
GLUCOSE SERPL-MCNC: 235 MG/DL (ref 65–140)
GLUCOSE SERPL-MCNC: 289 MG/DL (ref 65–140)
GLUCOSE SERPL-MCNC: 44 MG/DL (ref 65–140)
GLUCOSE SERPL-MCNC: 69 MG/DL (ref 65–140)
HCT VFR BLD AUTO: 27.1 % (ref 36.5–49.3)
HGB BLD-MCNC: 8.8 G/DL (ref 12–17)
IMM GRANULOCYTES # BLD AUTO: 0.03 THOUSAND/UL (ref 0–0.2)
IMM GRANULOCYTES NFR BLD AUTO: 0 % (ref 0–2)
LYMPHOCYTES # BLD AUTO: 1.36 THOUSANDS/ΜL (ref 0.6–4.47)
LYMPHOCYTES NFR BLD AUTO: 17 % (ref 14–44)
MAGNESIUM SERPL-MCNC: 1.7 MG/DL (ref 1.6–2.6)
MCH RBC QN AUTO: 30.6 PG (ref 26.8–34.3)
MCHC RBC AUTO-ENTMCNC: 32.5 G/DL (ref 31.4–37.4)
MCV RBC AUTO: 94 FL (ref 82–98)
MONOCYTES # BLD AUTO: 0.73 THOUSAND/ΜL (ref 0.17–1.22)
MONOCYTES NFR BLD AUTO: 9 % (ref 4–12)
NEUTROPHILS # BLD AUTO: 5.52 THOUSANDS/ΜL (ref 1.85–7.62)
NEUTS SEG NFR BLD AUTO: 70 % (ref 43–75)
NRBC BLD AUTO-RTO: 0 /100 WBCS
PHOSPHATE SERPL-MCNC: 3.5 MG/DL (ref 2.3–4.1)
PLATELET # BLD AUTO: 158 THOUSANDS/UL (ref 149–390)
PMV BLD AUTO: 10.6 FL (ref 8.9–12.7)
POTASSIUM SERPL-SCNC: 4.2 MMOL/L (ref 3.5–5.3)
PROCALCITONIN SERPL-MCNC: 0.23 NG/ML
PROT SERPL-MCNC: 6.1 G/DL (ref 6.4–8.2)
RBC # BLD AUTO: 2.88 MILLION/UL (ref 3.88–5.62)
SODIUM SERPL-SCNC: 141 MMOL/L (ref 136–145)
WBC # BLD AUTO: 7.91 THOUSAND/UL (ref 4.31–10.16)

## 2020-03-15 PROCEDURE — 80053 COMPREHEN METABOLIC PANEL: CPT | Performed by: INTERNAL MEDICINE

## 2020-03-15 PROCEDURE — 99232 SBSQ HOSP IP/OBS MODERATE 35: CPT | Performed by: INTERNAL MEDICINE

## 2020-03-15 PROCEDURE — 84100 ASSAY OF PHOSPHORUS: CPT | Performed by: INTERNAL MEDICINE

## 2020-03-15 PROCEDURE — 83735 ASSAY OF MAGNESIUM: CPT | Performed by: INTERNAL MEDICINE

## 2020-03-15 PROCEDURE — 82948 REAGENT STRIP/BLOOD GLUCOSE: CPT

## 2020-03-15 PROCEDURE — 85025 COMPLETE CBC W/AUTO DIFF WBC: CPT | Performed by: INTERNAL MEDICINE

## 2020-03-15 PROCEDURE — 84145 PROCALCITONIN (PCT): CPT | Performed by: INTERNAL MEDICINE

## 2020-03-15 RX ORDER — MAGNESIUM SULFATE 1 G/100ML
1 INJECTION INTRAVENOUS ONCE
Status: COMPLETED | OUTPATIENT
Start: 2020-03-15 | End: 2020-03-15

## 2020-03-15 RX ADMIN — PIPERACILLIN AND TAZOBACTAM 2.25 G: 2; .25 INJECTION, POWDER, FOR SOLUTION INTRAVENOUS at 00:06

## 2020-03-15 RX ADMIN — SODIUM CHLORIDE, SODIUM GLUCONATE, SODIUM ACETATE, POTASSIUM CHLORIDE, MAGNESIUM CHLORIDE, SODIUM PHOSPHATE, DIBASIC, AND POTASSIUM PHOSPHATE 50 ML/HR: .53; .5; .37; .037; .03; .012; .00082 INJECTION, SOLUTION INTRAVENOUS at 11:15

## 2020-03-15 RX ADMIN — VANCOMYCIN HYDROCHLORIDE 125 MG: 500 INJECTION, POWDER, LYOPHILIZED, FOR SOLUTION INTRAVENOUS at 11:27

## 2020-03-15 RX ADMIN — MAGNESIUM SULFATE HEPTAHYDRATE 1 G: 1 INJECTION, SOLUTION INTRAVENOUS at 14:25

## 2020-03-15 RX ADMIN — VANCOMYCIN HYDROCHLORIDE 1000 MG: 1 INJECTION, POWDER, LYOPHILIZED, FOR SOLUTION INTRAVENOUS at 15:48

## 2020-03-15 RX ADMIN — VANCOMYCIN HYDROCHLORIDE 125 MG: 500 INJECTION, POWDER, LYOPHILIZED, FOR SOLUTION INTRAVENOUS at 17:28

## 2020-03-15 RX ADMIN — HEPARIN SODIUM 5000 UNITS: 5000 INJECTION INTRAVENOUS; SUBCUTANEOUS at 14:24

## 2020-03-15 RX ADMIN — HEPARIN SODIUM 5000 UNITS: 5000 INJECTION INTRAVENOUS; SUBCUTANEOUS at 05:05

## 2020-03-15 RX ADMIN — INSULIN LISPRO 3 UNITS: 100 INJECTION, SOLUTION INTRAVENOUS; SUBCUTANEOUS at 11:27

## 2020-03-15 RX ADMIN — VANCOMYCIN HYDROCHLORIDE 125 MG: 500 INJECTION, POWDER, LYOPHILIZED, FOR SOLUTION INTRAVENOUS at 00:06

## 2020-03-15 RX ADMIN — PIPERACILLIN AND TAZOBACTAM 2.25 G: 2; .25 INJECTION, POWDER, FOR SOLUTION INTRAVENOUS at 18:08

## 2020-03-15 RX ADMIN — INSULIN LISPRO 1 UNITS: 100 INJECTION, SOLUTION INTRAVENOUS; SUBCUTANEOUS at 17:28

## 2020-03-15 RX ADMIN — Medication 1 CAPSULE: at 17:28

## 2020-03-15 RX ADMIN — VANCOMYCIN HYDROCHLORIDE 125 MG: 500 INJECTION, POWDER, LYOPHILIZED, FOR SOLUTION INTRAVENOUS at 05:05

## 2020-03-15 RX ADMIN — ASPIRIN 81 MG 81 MG: 81 TABLET ORAL at 08:58

## 2020-03-15 RX ADMIN — PIPERACILLIN AND TAZOBACTAM 2.25 G: 2; .25 INJECTION, POWDER, FOR SOLUTION INTRAVENOUS at 08:58

## 2020-03-15 RX ADMIN — INSULIN LISPRO 2 UNITS: 100 INJECTION, SOLUTION INTRAVENOUS; SUBCUTANEOUS at 21:17

## 2020-03-15 RX ADMIN — VITAMIN D, TAB 1000IU (100/BT) 1000 UNITS: 25 TAB at 08:58

## 2020-03-15 RX ADMIN — HEPARIN SODIUM 5000 UNITS: 5000 INJECTION INTRAVENOUS; SUBCUTANEOUS at 21:17

## 2020-03-15 NOTE — ASSESSMENT & PLAN NOTE
Malnutrition Findings:   Malnutrition type: Chronic illness  Degree of Malnutrition: Other severe protein calorie malnutrition    BMI Findings: Body mass index is 23 46 kg/m²       · Continue nutritional supplements per the dietitian's recommendations

## 2020-03-15 NOTE — ASSESSMENT & PLAN NOTE
· Transient bradycardia on the Cardionet monitor this morning, so I will restart telemetry monitoring  · Check an EKG if the bradycardia recurs

## 2020-03-15 NOTE — ASSESSMENT & PLAN NOTE
· Episode in the [facility] while receiving lanreotide infusion  · Troponin levels x 3 sets were within normal limits  · Transient bradycardia on the Cardionet monitor this morning, so I will restart telemetry monitoring  · Check an EKG if the bradycardia recurs    Echo complete with contrast if indicated   Status: Final result   PACS Images      Show images for Echo complete with contrast if indicated   Study Result     [address] [place] [address] [facility] [place] [address]  [phone]     Transthoracic Echocardiogram  2D, M-mode, Doppler, and Color Doppler     Study date:  12-Mar-2020     Patient: Griselda Alonso  MR number: [id number]  Account number: [de-identified]  : 1940  Age: 79 years  Gender: Male  Status: Inpatient  Location: Bedside  Height: 68 in  Weight: 146 lb  BP: 149/ 72 mmHg     Indications: syncope, sepsis     Diagnoses: A41 9 - Sepsis, unspecified, R55  - Syncope and collapse     Sonographer:  Sudha Moralez RDCS  Referring Physician:  Jami Robledo DO  Group:  [facility]  Interpreting Physician:  Los Molina DO     SUMMARY     LEFT VENTRICLE:  Systolic function was normal  Ejection fraction was estimated to be 60 %  Although no diagnostic regional wall motion abnormality was identified, this possibility cannot be completely excluded on the basis of this study  Doppler parameters were consistent with abnormal left ventricular relaxation (grade 1 diastolic dysfunction)      TRICUSPID VALVE:  There was mild regurgitation      HISTORY: PRIOR HISTORY: diabetes mellitus, chronic kidney disease, mixed hyperlipidemia, hypertension     PROCEDURE: The procedure was performed at the bedside  This was a routine study  The transthoracic approach was used  The study included complete 2D imaging, M-mode, complete spectral Doppler, and color Doppler   Images were obtained from  the parasternal, apical, subcostal, and suprasternal notch acoustic windows  Echocardiographic views were limited due to poor acoustic window availability, decreased penetration, and lung interference  This was a technically difficult  study      LEFT VENTRICLE: Size was normal  Systolic function was normal  Ejection fraction was estimated to be 60 %  Although no diagnostic regional wall motion abnormality was identified, this possibility cannot be completely excluded on the basis  of this study  Wall thickness was normal  DOPPLER: Doppler parameters were consistent with abnormal left ventricular relaxation (grade 1 diastolic dysfunction)      RIGHT VENTRICLE: The size was normal  Systolic function was normal  Wall thickness was normal      LEFT ATRIUM: Size was normal      RIGHT ATRIUM: Size was normal      MITRAL VALVE: Valve structure was normal  There was normal leaflet separation  DOPPLER: The transmitral velocity was within the normal range  There was no evidence for stenosis  There was no regurgitation      AORTIC VALVE: The valve was trileaflet  Leaflets exhibited normal thickness and normal cuspal separation  DOPPLER: Transaortic velocity was within the normal range  There was no evidence for stenosis  There was no regurgitation      TRICUSPID VALVE: The valve structure was normal  There was normal leaflet separation  DOPPLER: The transtricuspid velocity was within the normal range  There was no evidence for stenosis  There was mild regurgitation      PULMONIC VALVE: Leaflets exhibited normal thickness, no calcification, and normal cuspal separation  DOPPLER: The transpulmonic velocity was within the normal range  There was no regurgitation      PERICARDIUM: There was no pericardial effusion   The pericardium was normal in appearance      AORTA: The root exhibited normal size      SYSTEMIC VEINS: IVC: The inferior vena cava was not well visualized      SYSTEM MEASUREMENT TABLES     2D  %FS: 42 08 %  Ao Diam: 3 14 cm  EDV(Teich): 101 7 ml  EF(Teich): 73 08 %  ESV(Teich): 27 38 ml  IVSd: 1 07 cm  LA Area: 17 16 cm2  LA Diam: 3 71 cm  LVEDV MOD A4C: 76 23 ml  LVEF MOD A4C: 65 07 %  LVESV MOD A4C: 26 63 ml  LVIDd: 4 69 cm  LVIDs: 2 71 cm  LVLd A4C: 7 68 cm  LVLs A4C: 6 83 cm  LVOT Diam: 2 03 cm  LVPWd: 1 16 cm  RA Area: 12 3 cm2  RVIDd: 2 57 cm  RWT: 0 5  SV MOD A4C: 49 6 ml  SV(Teich): 74 32 ml     CW  AV Vmax: 1 48 m/s  AV maxP 83 mmHg  PV Vmax: 1 29 m/s  PV maxP 67 mmHg  TR Vmax: 2 93 m/s  TR maxP 44 mmHg     MM  TAPSE: 2 6 cm     PW  PARIS Vmax, Pt: 1 97 cm2  E' Av 06 m/s  E' Lat: 0 07 m/s  E' Sept: 0 04 m/s  E/E' Av 89  E/E' Lat: 10 6  E/E' Sept: 16 42  LVOT Vmax: 0 9 m/s  LVOT maxPG: 3 22 mmHg  MV A [place]: 1 14 m/s  MV Dec [place]: 2 51 m/s2  MV DecT: 287 61 ms  MV E [place]: 0 72 m/s  MV E/A Ratio: 0 63  RVOT Vmax: 0 7 m/s  RVOT maxP 99 mmHg     [facility] Accredited Echocardiography Laboratory     Prepared and electronically signed by  Mark Dejesus DO  Signed 12-Mar-2020 15:54:24

## 2020-03-15 NOTE — PROGRESS NOTES
Vancomycin Assessment    Phong Sanhcez is a 79 y o  male who is currently receiving vancomycin 750mg Q24H for urinary tract infection sepsis   Relevant clinical data and objective history reviewed:  Creatinine   Date Value Ref Range Status   03/15/2020 2 12 (H) 0 60 - 1 30 mg/dL Final     Comment:     Standardized to IDMS reference method   03/14/2020 2 14 (H) 0 60 - 1 30 mg/dL Final     Comment:     Standardized to IDMS reference method   03/13/2020 2 31 (H) 0 60 - 1 30 mg/dL Final     Comment:     Standardized to IDMS reference method     /69 (BP Location: Left arm)   Pulse 67   Temp 97 6 °F (36 4 °C) (Oral)   Resp 18   Ht 5' 8" (1 727 m)   Wt 70 kg (154 lb 5 2 oz)   SpO2 99%   BMI 23 46 kg/m²   I/O last 3 completed shifts: In: 0029 [P O :780; I V :1980]  Out: 5575 [Urine:5575]  Lab Results   Component Value Date/Time    BUN 29 (H) 03/15/2020 04:55 AM    WBC 7 91 03/15/2020 04:55 AM    HGB 8 8 (L) 03/15/2020 04:55 AM    HCT 27 1 (L) 03/15/2020 04:55 AM    MCV 94 03/15/2020 04:55 AM     03/15/2020 04:55 AM     Temp Readings from Last 3 Encounters:   03/15/20 97 6 °F (36 4 °C) (Oral)   03/11/20 97 8 °F (36 6 °C) (Tympanic)   03/08/20 (!) 97 4 °F (36 3 °C) (Temporal)     Vancomycin Days of Therapy: 5    Assessment/Plan  The patient is currently on vancomycin utilizing scheduled dosing  Baseline risks associated with therapy include: pre-existing renal impairment and advanced age  The patient is receiving 750mg Q24H with the most recent vancomycin level being not at steady-state and sub-therapeutic based on a goal of 15-20 (appropriate for most indications) ; therefore, after clinical evaluation will be changed to 1000mg Q24H   Pharmacy will continue to follow closely for s/sx of nephrotoxicity, infusion reactions and appropriateness of therapy  BMP and CBC will be ordered per protocol    Plan for trough as patient approaches steady state, prior to the 3rd  dose at approximately 14:30 on 03/17/2020  Pharmacy will continue to follow the patients culture results and clinical progress daily      Amanda Solares, Pharmacist

## 2020-03-15 NOTE — ASSESSMENT & PLAN NOTE
· Associated with indwelling Haile catheter  · Continue IV zosyn and IV vancomycin based on the urine culture results    Urine culture [[id number]] (Abnormal)  Collected: 03/11/20 1143   Lab Status: Preliminary result Specimen: Urine, Indwelling Haile Catheter Updated: 03/13/20 1135    Urine Culture >100,000 cfu/ml Escherichia coli ESBLAbnormal     Comment: An Extended-Spectrum Beta-Lactamase is being produced by this organism (requires contact precautions)  Cephalosporins are NOT effective for treating these organisms  For SEVERE infections (i e  bacteremia, septic shock, etc ), Carbapenems are the drug of choice  For MILD infections (i e  isolated urinary or biliary infection), high-dose Zosyn may be used          10,000-19,000 cfu/ml Enterococcus faecalisAbnormal    Susceptibility     Escherichia coli ESBL (1)     Antibiotic Interpretation Microscan Method Status    Amikacin ($$) Susceptible <=16 ug/ml LUIS Preliminary    Amoxicillin + Clavulanate Susceptible 8/4 ug/ml LUIS Preliminary    Ampicillin ($$) Resistant >16 00 ug/ml LUIS Preliminary    Ampicillin + Sulbactam ($) Resistant >16/8 ug/ml LUIS Preliminary    Aztreonam ($$$)  Resistant >16 ug/ml LUIS Preliminary    Cefazolin ($) Resistant >16 00 ug/ml LUIS Preliminary    Cefepime ($) Resistant >16 00 ug/ml LUIS Preliminary    Cefotaxime ($) Resistant >32 00 ug/ml LUIS Preliminary    Ceftazidime ($$) Resistant >16 ug/ml LUIS Preliminary    Ceftriaxone ($$) Resistant >32 00 ug/ml LUIS Preliminary    Cefuroxime ($$) Resistant >16 ug/ml LUIS Preliminary    Ciprofloxacin ($)  Resistant >2 00 ug/ml LUIS Preliminary    Ertapenem ($$$) Susceptible <=0 5 ug/ml LUIS Preliminary    Gentamicin ($$) Resistant >8 ug/ml LUIS Preliminary    Levofloxacin ($) Resistant >4 00 ug/ml LUIS Preliminary    Nitrofurantoin Resistant >64 ug/ml LUIS Preliminary    Piperacillin + Tazobactam ($$$) Susceptible <=4 ug/ml LUIS Preliminary    Tetracycline Susceptible <=4 ug/ml LUIS Preliminary Tobramycin ($) Intermediate 8 ug/ml LUIS Preliminary    Trimethoprim + Sulfamethoxazole ($$$) Susceptible <=2/38 ug/ml LUIS Preliminary    Enterococcus faecalis (2)     Antibiotic Interpretation Microscan Method Status    Ampicillin ($$) Susceptible <=2 00 ug/ml LUIS Preliminary    Levofloxacin ($) Resistant >4 00 ug/ml LUIS Preliminary    Nitrofurantoin Susceptible <=32 ug/ml LUIS Preliminary    Tetracycline Resistant >8 ug/ml LUIS Preliminary    Vancomycin ($) Susceptible 1 00 ug/ml LUIS Preliminary

## 2020-03-15 NOTE — ASSESSMENT & PLAN NOTE
· Recheck a TSH level in 1-2 weeks with the patient's PCP after discharge    Results for Marina Rico (MRN [id number]) as of 3/13/2020 16:28   Ref  Range 3/13/2020 05:26   Free T4 Latest Ref Range: 0 76 - 1 46 ng/dL 1 08       Results for Marina Rico (MRN [id number]) as of 3/12/2020 21:31   Ref   Range 3/12/2020 05:49   TSH 3RD GENERATON Latest Ref Range: 0 358 - 3 740 uIU/mL 0 205 (L)

## 2020-03-15 NOTE — ASSESSMENT & PLAN NOTE
Lab Results   Component Value Date    HGBA1C 6 6 (H) 03/12/2020       Recent Labs     03/14/20  1606 03/14/20  2043 03/15/20  0718 03/15/20  0810   POCGLU 244* 250* 44* 114       Blood Sugar Average: Last 72 hrs:  (P) 203 4     · Recurrent hypoglycemia this morning  · Hold levemir for now  · Hypoglycemia protocol  · Follow the blood glucose trend

## 2020-03-15 NOTE — PROGRESS NOTES
Progress Note - Analisa Davidson 1940, 79 y o  male MRN: [id number]    Unit/Bed#: 415-01 Encounter: [id number]    Primary Care Provider: Tom Morales DO   Date and time admitted to hospital: 3/11/2020  9:12 AM        * Sepsis [facility])  Assessment & Plan  · Sepsis was present on admission and secondary to possible acute cystitis associated with an indwelling Haile catheter  · SIRS criteria was met with hypothermia and a leukocytosis  · Continue IV zosyn and IV vancomycin based on the urine culture results  · Serial laboratory testing to monitor the patient's renal function while on the combination of IV zosyn and IV vancomycin  · Continue IV fluids with isolyte at 50 ml/hr  · Follow culture results  · The lactic acid level was within normal limits    I discussed goals of care with the patient, and the patient wishes to be a Level 1-Full Code  Bradycardia  Assessment & Plan  · Transient bradycardia on the Cardionet monitor this morning, so I will restart telemetry monitoring  · Check an EKG if the bradycardia recurs    Acute cystitis with hematuria  Assessment & Plan  · Associated with indwelling Haile catheter  · Continue IV zosyn and IV vancomycin based on the urine culture results    Urine culture [[id number]] (Abnormal)  Collected: 03/11/20 1143   Lab Status: Preliminary result Specimen: Urine, Indwelling Haile Catheter Updated: 03/13/20 1135    Urine Culture >100,000 cfu/ml Escherichia coli ESBLAbnormal     Comment: An Extended-Spectrum Beta-Lactamase is being produced by this organism (requires contact precautions)  Cephalosporins are NOT effective for treating these organisms  For SEVERE infections (i e  bacteremia, septic shock, etc ), Carbapenems are the drug of choice  For MILD infections (i e  isolated urinary or biliary infection), high-dose Zosyn may be used          10,000-19,000 cfu/ml Enterococcus faecalisAbnormal    Susceptibility     Escherichia coli ESBL (1)     Antibiotic Interpretation Microscan Method Status    Amikacin ($$) Susceptible <=16 ug/ml LUIS Preliminary    Amoxicillin + Clavulanate Susceptible 8/4 ug/ml LUIS Preliminary    Ampicillin ($$) Resistant >16 00 ug/ml LUIS Preliminary    Ampicillin + Sulbactam ($) Resistant >16/8 ug/ml LUIS Preliminary    Aztreonam ($$$)  Resistant >16 ug/ml LUIS Preliminary    Cefazolin ($) Resistant >16 00 ug/ml LUIS Preliminary    Cefepime ($) Resistant >16 00 ug/ml LUIS Preliminary    Cefotaxime ($) Resistant >32 00 ug/ml LUIS Preliminary    Ceftazidime ($$) Resistant >16 ug/ml LUIS Preliminary    Ceftriaxone ($$) Resistant >32 00 ug/ml LUIS Preliminary    Cefuroxime ($$) Resistant >16 ug/ml LUIS Preliminary    Ciprofloxacin ($)  Resistant >2 00 ug/ml LUIS Preliminary    Ertapenem ($$$) Susceptible <=0 5 ug/ml LUIS Preliminary    Gentamicin ($$) Resistant >8 ug/ml LUIS Preliminary    Levofloxacin ($) Resistant >4 00 ug/ml LUIS Preliminary    Nitrofurantoin Resistant >64 ug/ml LUIS Preliminary    Piperacillin + Tazobactam ($$$) Susceptible <=4 ug/ml LUIS Preliminary    Tetracycline Susceptible <=4 ug/ml LUIS Preliminary    Tobramycin ($) Intermediate 8 ug/ml LUIS Preliminary    Trimethoprim + Sulfamethoxazole ($$$) Susceptible <=2/38 ug/ml LUIS Preliminary    Enterococcus faecalis (2)     Antibiotic Interpretation Microscan Method Status    Ampicillin ($$) Susceptible <=2 00 ug/ml LUIS Preliminary    Levofloxacin ($) Resistant >4 00 ug/ml LUIS Preliminary    Nitrofurantoin Susceptible <=32 ug/ml LUIS Preliminary    Tetracycline Resistant >8 ug/ml LUIS Preliminary    Vancomycin ($) Susceptible 1 00 ug/ml LUIS Preliminary                Syncope and collapse  Assessment & Plan  · Episode in the [facility]fusion [facility] while receiving lanreotide infusion  · Troponin levels x 3 sets were within normal limits  · Transient bradycardia on the Cardionet monitor this morning, so I will restart telemetry monitoring  · Check an EKG if the bradycardia recurs    Echo complete with contrast if indicated   Status: Final result   PACS Images      Show images for Echo complete with contrast if indicated   Study Result     [address] [place]  [address]  [phone]     Transthoracic Echocardiogram  2D, M-mode, Doppler, and Color Doppler     Study date:  12-Mar-2020     Patient: Nuria Parikh  MR number: [id number]  Account number: [de-identified]  : 1940  Age: 79 years  Gender: Male  Status: Inpatient  Location: Bedside  Height: 68 in  Weight: 146 lb  BP: 149/ 72 mmHg     Indications: syncope, sepsis     Diagnoses: A41 9 - Sepsis, unspecified, R55  - Syncope and collapse     Sonographer:  Lucia Ordoñez [facility]  Referring Physician:  Flaco Sousa DO  Group:  [facility]  Interpreting Physician:  Mignon العراقي DO     SUMMARY     LEFT VENTRICLE:  Systolic function was normal  Ejection fraction was estimated to be 60 %  Although no diagnostic regional wall motion abnormality was identified, this possibility cannot be completely excluded on the basis of this study  Doppler parameters were consistent with abnormal left ventricular relaxation (grade 1 diastolic dysfunction)      TRICUSPID VALVE:  There was mild regurgitation      HISTORY: PRIOR HISTORY: diabetes mellitus, chronic kidney disease, mixed hyperlipidemia, hypertension     PROCEDURE: The procedure was performed at the bedside  This was a routine study  The transthoracic approach was used  The study included complete 2D imaging, M-mode, complete spectral Doppler, and color Doppler  Images were obtained from  the parasternal, apical, subcostal, and suprasternal notch acoustic windows  Echocardiographic views were limited due to poor acoustic window availability, decreased penetration, and lung interference  This was a technically difficult  study      LEFT VENTRICLE: Size was normal  Systolic function was normal  Ejection fraction was estimated to be 60 %  Although no diagnostic regional wall motion abnormality was identified, this possibility cannot be completely excluded on the basis  of this study  Wall thickness was normal  DOPPLER: Doppler parameters were consistent with abnormal left ventricular relaxation (grade 1 diastolic dysfunction)      RIGHT VENTRICLE: The size was normal  Systolic function was normal  Wall thickness was normal      LEFT ATRIUM: Size was normal      RIGHT ATRIUM: Size was normal      MITRAL VALVE: Valve structure was normal  There was normal leaflet separation  DOPPLER: The transmitral velocity was within the normal range  There was no evidence for stenosis  There was no regurgitation      AORTIC VALVE: The valve was trileaflet  Leaflets exhibited normal thickness and normal cuspal separation  DOPPLER: Transaortic velocity was within the normal range  There was no evidence for stenosis  There was no regurgitation      TRICUSPID VALVE: The valve structure was normal  There was normal leaflet separation  DOPPLER: The transtricuspid velocity was within the normal range  There was no evidence for stenosis  There was mild regurgitation      PULMONIC VALVE: Leaflets exhibited normal thickness, no calcification, and normal cuspal separation  DOPPLER: The transpulmonic velocity was within the normal range  There was no regurgitation      PERICARDIUM: There was no pericardial effusion   The pericardium was normal in appearance      AORTA: The root exhibited normal size      SYSTEMIC VEINS: IVC: The inferior vena cava was not well visualized      SYSTEM MEASUREMENT TABLES     2D  %FS: 42 08 %  Ao Diam: 3 14 cm  EDV(Teich): 101 7 ml  EF(Teich): 73 08 %  ESV(Teich): 27 38 ml  IVSd: 1 07 cm  LA Area: 17 16 cm2  LA Diam: 3 71 cm  LVEDV MOD A4C: 76 23 ml  LVEF MOD A4C: 65 07 %  LVESV MOD A4C: 26 63 ml  LVIDd: 4 69 cm  LVIDs: 2 71 cm  LVLd A4C: 7 68 cm  LVLs A4C: 6 83 cm  LVOT Diam: 2 03 cm  LVPWd: 1 16 cm  RA Area: 12 3 cm2  RVIDd: 2 57 cm  RWT: 0 5  SV MOD A4C: 49 6 ml  SV(Teich): 74 32 ml     CW  AV Vmax: 1 48 m/s  AV maxP 83 mmHg  PV Vmax: 1 29 m/s  PV maxP 67 mmHg  TR Vmax: 2 93 m/s  TR maxP 44 mmHg     MM  TAPSE: 2 6 cm     PW  PARIS Vmax, Pt: 1 97 cm2  E' Av 06 m/s  E' Lat: 0 07 m/s  E' Sept: 0 04 m/s  E/E' Av 89  E/E' Lat: 10 6  E/E' Sept: 16 42  LVOT Vmax: 0 9 m/s  LVOT maxPG: 3 22 mmHg  MV A Antonio: 1 14 m/s  MV Dec Payette: 2 51 m/s2  MV DecT: 287 61 ms  MV E Antonio: 0 72 m/s  MV E/A Ratio: 0 63  RVOT Vmax: 0 7 m/s  RVOT maxP 99 mmHg     [facility]     Prepared and electronically signed by  Marco A Gutierrez DO  Signed 12-Mar-2020 15:54:24         Premature atrial complexes  Assessment & Plan  · Outpatient follow-up with Cardiology    Diastolic dysfunction  Assessment & Plan  · Monitor the patient's volume status closely    Severe protein-calorie malnutrition (HCC)  Assessment & Plan  Malnutrition Findings:   Malnutrition type: Chronic illness  Degree of Malnutrition: Other severe protein calorie malnutrition    BMI Findings: Body mass index is 23 46 kg/m²  · Continue nutritional supplements per the dietitian's recommendations    Low TSH level  Assessment & Plan  · Recheck a TSH level in 1-2 weeks with the patient's PCP after discharge    Results for Nicola Plummer (MRN [id number]) as of 3/13/2020 16:28   Ref  Range 3/13/2020 05:26   Free T4 Latest Ref Range: 0 76 - 1 46 ng/dL 1 08       Results for Nicola Plummer (MRN [id number]) as of 3/12/2020 21:31   Ref   Range 3/12/2020 05:49   TSH 3RD GENERATON Latest Ref Range: 0 358 - 3 740 uIU/mL 0 205 (L)       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  Assessment & Plan  · Utilize vancomycin 125 mg PO every 6 hours while on IV antibiotic treatment    Clostridium difficile toxin by PCR with EIA [[id number]] (Abnormal) Collected: 20   Lab Status: Final result Specimen: Stool from Per Rectum Updated: 20 1429    C difficile toxin by PCR PositiveAbnormal     Comment: Result suggests infection or colonization with C  difficile  EIA testing has been performed to clarify  Maintain strict contact and hand hygiene precautions  C difficile Toxins A+B, EIA Negative    Comment: Probable C  difficile colonization without active infection  Treatment recommended if severe diarrhea without alternate etiology  Maintain strict contact and hand hygiene precautions  This is an appended report  Baldev Maki results have been appended to a previously preliminary verified report  Clostridium difficile toxin by PCR with EIA [[id number]] (Normal) Collected: 03/11/20 1910   Lab Status: Final result Specimen: Stool from Per Rectum Updated: 03/12/20 1127    C difficile toxin by PCR Negative    Comment: No evidence for C  difficile colonization or infection   No special contact precautions required  Elevated total protein  Assessment & Plan  · Likely secondary to volume depletion  · Resolved  · Follow the total protein level    Hyperkalemia  Assessment & Plan  · Hemolyzed specimen  · Resolved  · Follow the potassium level    Stage 4 chronic kidney disease (HCC)  Assessment & Plan  · Baseline serum creatinine of 1 9-2 4 mg/dl  · Avoid all nephrotoxic agents  · Serial laboratory testing to monitor the patient's renal function and electrolytes    Chronic indwelling Haile catheter  Assessment & Plan  · Urinary tract infection associated with indwelling Haile catheter  · Continue IV zosyn and IV vancomycin based on the urine culture results    Urine culture [[id number]] (Abnormal)  Collected: 03/11/20 1143   Lab Status: Preliminary result Specimen: Urine, Indwelling Haile Catheter Updated: 03/13/20 1135    Urine Culture >100,000 cfu/ml Escherichia coli ESBLAbnormal     Comment: An Extended-Spectrum Beta-Lactamase is being produced by this organism (requires contact precautions)  Cephalosporins are NOT effective for treating these organisms     For SEVERE infections (i e  bacteremia, septic shock, etc ), Carbapenems are the drug of choice  For MILD infections (i e  isolated urinary or biliary infection), high-dose Zosyn may be used          10,000-19,000 cfu/ml Enterococcus faecalisAbnormal    Susceptibility     Escherichia coli ESBL (1)     Antibiotic Interpretation Microscan Method Status    Amikacin ($$) Susceptible <=16 ug/ml LUIS Preliminary    Amoxicillin + Clavulanate Susceptible 8/4 ug/ml LUIS Preliminary    Ampicillin ($$) Resistant >16 00 ug/ml LUIS Preliminary    Ampicillin + Sulbactam ($) Resistant >16/8 ug/ml LUIS Preliminary    Aztreonam ($$$)  Resistant >16 ug/ml LUIS Preliminary    Cefazolin ($) Resistant >16 00 ug/ml LUIS Preliminary    Cefepime ($) Resistant >16 00 ug/ml LUIS Preliminary    Cefotaxime ($) Resistant >32 00 ug/ml LUIS Preliminary    Ceftazidime ($$) Resistant >16 ug/ml LUIS Preliminary    Ceftriaxone ($$) Resistant >32 00 ug/ml LUIS Preliminary    Cefuroxime ($$) Resistant >16 ug/ml LUIS Preliminary    Ciprofloxacin ($)  Resistant >2 00 ug/ml LUIS Preliminary    Ertapenem ($$$) Susceptible <=0 5 ug/ml LUIS Preliminary    Gentamicin ($$) Resistant >8 ug/ml LUIS Preliminary    Levofloxacin ($) Resistant >4 00 ug/ml LUIS Preliminary    Nitrofurantoin Resistant >64 ug/ml LUIS Preliminary    Piperacillin + Tazobactam ($$$) Susceptible <=4 ug/ml LUIS Preliminary    Tetracycline Susceptible <=4 ug/ml ULIS Preliminary    Tobramycin ($) Intermediate 8 ug/ml LUIS Preliminary    Trimethoprim + Sulfamethoxazole ($$$) Susceptible <=2/38 ug/ml LUIS Preliminary    Enterococcus faecalis (2)     Antibiotic Interpretation Microscan Method Status    Ampicillin ($$) Susceptible <=2 00 ug/ml LUIS Preliminary    Levofloxacin ($) Resistant >4 00 ug/ml LUIS Preliminary    Nitrofurantoin Susceptible <=32 ug/ml LUIS Preliminary    Tetracycline Resistant >8 ug/ml LUIS Preliminary    Vancomycin ($) Susceptible 1 00 ug/ml LUIS Preliminary                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  · Receiving lanreotide infusions as an outpatient with Hematology/Oncology    Type 2 diabetes mellitus with hypoglycemia without coma, with long-term current use of insulin [facility])  Assessment & Plan  Lab Results   Component Value Date    HGBA1C 6 6 (H) 2020       Recent Labs     20  1606 20  2043 03/15/20  0718 03/15/20  0810   POCGLU 244* 250* 44* 114       Blood Sugar Average: Last 72 hrs:  (P) 203 4     · Recurrent hypoglycemia this morning  · Hold levemir for now  · Hypoglycemia protocol  · Follow the blood glucose trend        VTE Pharmacologic Prophylaxis:   Pharmacologic: Heparin  Mechanical VTE Prophylaxis in Place: Yes    Patient Centered Rounds: I have performed bedside rounds with nursing staff today  Time Spent for Care: 30 minutes  More than 50% of total time spent on counseling and coordination of care as described above  Current Length of Stay: 4 day(s)    Current Patient Status: Inpatient   Certification Statement: The patient will continue to require additional inpatient hospital stay due to the need for IV antibiotic treatment  Code Status: Level 1 - Full Code      Subjective: The patient was seen and examined  The patient is doing better  No chest pain  No shortness of breath  No abdominal pain  No nausea or vomiting  Objective:     Vitals:   Temp (24hrs), Av °F (36 7 °C), Min:97 6 °F (36 4 °C), Max:98 3 °F (36 8 °C)    Temp:  [97 6 °F (36 4 °C)-98 3 °F (36 8 °C)] 97 6 °F (36 4 °C)  HR:  [65-72] 67  Resp:  [18] 18  BP: (132-179)/(62-73) 136/69  SpO2:  [98 %-99 %] 99 %  Body mass index is 23 46 kg/m²  Input and Output Summary (last 24 hours):        Intake/Output Summary (Last 24 hours) at 3/15/2020 1006  Last data filed at 3/15/2020 0501  Gross per 24 hour   Intake 1540 ml   Output 2550 ml   Net -1010 ml       Physical Exam:     Physical Exam  General:  NAD, follows commands  HEENT: NC/AT, mucous membranes moist  Neck:  Supple, No JVP elevation  CV:  + S1, + S2, Intermittent bradycardia, Regular rhythm  Pulm:  Lung fields are CTA bilaterally  Abd:  Soft, Non-tender, Non-distended  Ext:  No clubbing/cyanosis/edema  Skin:  No rashes  Neuro:  Awake, alert, oriented  Psych:  Normal mood and affect  :  Indwelling Haile cathether      Additional Data:    Labs:    Results from last 7 days   Lab Units 03/15/20  0455   WBC Thousand/uL 7 91   HEMOGLOBIN g/dL 8 8*   HEMATOCRIT % 27 1*   PLATELETS Thousands/uL 158   NEUTROS PCT % 70   LYMPHS PCT % 17   MONOS PCT % 9   EOS PCT % 3     Results from last 7 days   Lab Units 03/15/20  0455   SODIUM mmol/L 141   POTASSIUM mmol/L 4 2   CHLORIDE mmol/L 105   CO2 mmol/L 25   BUN mg/dL 29*   CREATININE mg/dL 2 12*   ANION GAP mmol/L 11   CALCIUM mg/dL 8 6   ALBUMIN g/dL 2 1*   TOTAL BILIRUBIN mg/dL 0 40   ALK PHOS U/L 145*   ALT U/L 26   AST U/L 17   GLUCOSE RANDOM mg/dL 69         Results from last 7 days   Lab Units 03/15/20  0810 03/15/20  0718 03/14/20  2043 03/14/20  1606 03/14/20  1124 03/14/20  0829 03/14/20  0746 03/13/20  2108 03/13/20  1620 03/13/20  1203 03/13/20  0728 03/12/20  2033   POC GLUCOSE mg/dl 114 44* 250* 244* 258* 137 48* 366* 299* 282* 146* 246*     Results from last 7 days   Lab Units 03/12/20  0549   HEMOGLOBIN A1C % 6 6*     Results from last 7 days   Lab Units 03/14/20  0504 03/13/20  0526 03/12/20  0549 03/11/20  1139   LACTIC ACID mmol/L  --   --  1 2 1 3   PROCALCITONIN ng/ml 0 47* 0 70* 1 23*  --            * I Have Reviewed All Lab Data Listed Above  * Additional Pertinent Lab Tests Reviewed: Philingquincy 66 Admission Reviewed      Recent Cultures (last 7 days):     Results from last 7 days   Lab Units 03/11/20  1910 03/11/20  1143 03/11/20  1140 03/11/20  1139   BLOOD CULTURE   --   --  No Growth at 72 hrs  No Growth at 72 hrs     URINE CULTURE   --  >100,000 cfu/ml Escherichia coli ESBL*  10,000-19,000 cfu/ml Enterococcus faecalis*  50,000-59,000 cfu/ml Candida parapsilosis*  --   --    C DIFF TOXIN B  Negative  --   --   --        Last 24 Hours Medication List:     Current Facility-Administered Medications:  acetaminophen 650 mg Oral Q6H PRN [facility], DO    aspirin 81 mg Oral Daily [facility], DO    b complex-vitamin C-folic acid 1 capsule Oral Daily With Siddhartha Jimenez, DO    cholecalciferol 1,000 Units Oral Daily [facility], DO    dextrose 25 mL Intravenous PRN [facility], DO    heparin (porcine) 5,000 Units Subcutaneous Q8H [facility] Joe Hernandez, DO    insulin lispro 1-5 Units Subcutaneous TID [facility]    insulin lispro 1-5 Units Subcutaneous HS [facility], DO    magnesium sulfate 1 g Intravenous Once [facility], DO    multi-electrolyte 50 mL/hr Intravenous Continuous [facility], DO Last Rate: 50 mL/hr (03/14/20 1248)   piperacillin-tazobactam 2 25 g Intravenous Q8H [facility], DO Last Rate: 2 25 g (03/15/20 0858)   vancomycin 1,000 mg Intravenous Q24H [facility], DO    vancomycin 125 mg Oral Q6H 6225 Franklyn Dodson DO         Today, Patient Was Seen By: [facility], DO    ** Please Note: Dictation voice to text software may have been used in the creation of this document   **

## 2020-03-15 NOTE — PLAN OF CARE
Problem: Potential for Falls  Goal: Patient will remain free of falls  Description  INTERVENTIONS:  - Assess patient frequently for physical needs  -  Identify cognitive and physical deficits and behaviors that affect risk of falls  (fatigue, weakness)  -  [place] fall precautions as indicated by assessment  (high fall risk)  - Educate patient/family on patient safety including physical limitations  - Instruct patient to call for assistance with activity based on assessment  - Modify environment to reduce risk of injury  - Consider OT/PT consult to assist with strengthening/mobility    Outcome: Progressing     Problem: Prexisting or High Potential for Compromised Skin Integrity  Goal: Skin integrity is maintained or improved  Description  INTERVENTIONS:  - Identify patients at risk for skin breakdown  - Assess and monitor skin integrity  - Assess and monitor nutrition and hydration status  - Monitor labs   - Assess for incontinence   - Turn and reposition patient (every 2 hours and prn)  - Assist with mobility/ambulation (mod to max assist and walker)  - Relieve pressure over bony prominences  - Avoid friction and shearing  - Provide appropriate hygiene as needed including keeping skin clean and dry  - Evaluate need for skin moisturizer/barrier cream  - Collaborate with interdisciplinary team   - Patient/family teaching  - Consider wound care consult    Outcome: Progressing     Problem: Nutrition/Hydration-ADULT  Goal: Nutrient/Hydration intake appropriate for improving, restoring or maintaining nutritional needs  Description  Monitor and assess patient's nutrition/hydration status for malnutrition  Collaborate with interdisciplinary team and initiate plan and interventions as ordered  Monitor patient's weight and dietary intake as ordered or per policy  Utilize nutrition screening tool and intervene as necessary  Determine patient's food preferences and provide high-protein, high-caloric foods as appropriate  INTERVENTIONS:  - Monitor oral intake, urinary output, labs, and treatment plans  - Assess nutrition and hydration status and recommend course of action  - Evaluate amount of meals eaten  - Assist patient with eating if necessary   - Allow adequate time for meals  - Recommend/ encourage appropriate diets, oral nutritional supplements, and vitamin/mineral supplements  - Order, calculate, and assess calorie counts as needed  - Assess need for intravenous fluids  - Provide specific nutrition/hydration education as appropriate  - Include patient/family/caregiver in decisions related to nutrition   Outcome: Progressing     Problem: DISCHARGE PLANNING - CARE MANAGEMENT  Goal: Discharge to post-acute care or home with appropriate resources  Description  INTERVENTIONS:  - Conduct assessment to determine patient/family and health care team treatment goals, and need for post-acute services based on payer coverage, community resources, and patient preferences, and barriers to discharge  - Address psychosocial, clinical, and financial barriers to discharge as identified in assessment in conjunction with the patient/family and health care team  - Arrange appropriate level of post-acute services according to patient's   needs and preference and payer coverage in collaboration with the physician and health care team  - Communicate with and update the patient/family, physician, and health care team regarding progress on the discharge plan  - Arrange appropriate transportation to post-acute venues  -? STR vs home with homecare/home PT and OP follow up    Outcome: Progressing     Problem: PAIN - ADULT  Goal: Verbalizes/displays adequate comfort level or baseline comfort level  Description  Interventions:  - Encourage patient to monitor pain and request assistance  - Assess pain using appropriate pain scale (0-10 pain scale)  - Administer analgesics based on type and severity of pain and evaluate response  - Implement non-pharmacological measures as appropriate and evaluate response  - Consider cultural and social influences on pain and pain management  - Notify physician/advanced practitioner if interventions unsuccessful or patient reports new pain   Outcome: Progressing     Problem: INFECTION - ADULT  Goal: Absence or prevention of progression during hospitalization  Description  INTERVENTIONS:  - Assess and monitor for signs and symptoms of infection  - Monitor lab/diagnostic results  - Monitor all insertion sites, i e  indwelling lines  - Administer medications as ordered  - Instruct and encourage patient and family to use good hand hygiene technique  - Identify and instruct in appropriate isolation precautions for identified infection/condition (contact precautions)   Outcome: Progressing     Problem: SAFETY ADULT  Goal: Maintain or return to baseline ADL function  Description  INTERVENTIONS:  -  Assess patient's ability to carry out ADLs; (dependent for cares)  - Assess/evaluate cause of self-care deficits (fatigue, weakness)  - Assess range of motion  - Assess patient's mobility; (mod to max assist and walker)  - Assess patient's need for assistive devices and provide as appropriate (walker)  - Encourage maximum independence but intervene and supervise when necessary  - Involve family in performance of ADLs  - Assess for home care needs following discharge   - Consider OT consult to assist with ADL evaluation and planning for discharge  - Provide patient education as appropriate   Outcome: Progressing  Goal: Maintain or return mobility status to optimal level  Description  INTERVENTIONS:  - Assess patient's baseline mobility status (walker and assistance from spouse)    - Identify cognitive and physical deficits and behaviors that affect mobility (fatigue, weakness)  - Identify mobility aids required to assist with transfers and/or ambulation (gait belt, walker)  - [place] fall precautions as indicated by assessment (high fall risk)  - Record patient progress and toleration of activity level on Mobility SBAR; progress patient to next Phase/Stage  - Instruct patient to call for assistance with activity based on assessment  - Consider rehabilitation consult to assist with strengthening/weightbearing, etc    Outcome: Progressing     Problem: DISCHARGE PLANNING  Goal: Discharge to home or other facility with appropriate resources  Description  INTERVENTIONS:  - Identify barriers to discharge w/patient and caregiver  - Arrange for needed discharge resources and transportation as appropriate  - Identify discharge learning needs (meds, wound care, etc )  - Refer to Case Management Department for coordinating discharge planning if the patient needs post-hospital services based on physician/advanced practitioner order or complex needs related to functional status, cognitive ability, or social support system   Outcome: Progressing     Problem: Knowledge Deficit  Goal: Patient/family/caregiver demonstrates understanding of disease process, treatment plan, medications, and discharge instructions  Description  Complete learning assessment and assess knowledge base    Interventions:  - Provide teaching at level of understanding  - Provide teaching via preferred learning methods  Outcome: Progressing     Problem: GENITOURINARY - ADULT  Goal: Urinary catheter remains patent  Description  INTERVENTIONS:  - Assess patency of urinary catheter  - If patient has a chronic garcia, consider changing catheter if non-functioning (garcia is functioning)  - Follow guidelines for intermittent irrigation of non-functioning urinary catheter   Outcome: Progressing     Problem: METABOLIC, FLUID AND ELECTROLYTES - ADULT  Goal: Electrolytes maintained within normal limits  Description  INTERVENTIONS:  - Monitor labs and assess patient for signs and symptoms of electrolyte imbalances  - Administer electrolyte replacement as ordered  - Monitor response to electrolyte replacements, including repeat lab results as appropriate  - Instruct patient on fluid and nutrition as appropriate  Outcome: Progressing  Goal: Fluid balance maintained  Description  INTERVENTIONS:  - Monitor labs   - Monitor I/O and WT  - Instruct patient on fluid and nutrition as appropriate  - Assess for signs & symptoms of volume excess or deficit  Outcome: Progressing  Goal: Glucose maintained within target range  Description  INTERVENTIONS:  - Monitor Blood Glucose as ordered  - Assess for signs and symptoms of hyperglycemia and hypoglycemia  - Administer ordered medications to maintain glucose within target range  - Assess nutritional intake and initiate nutrition service referral as needed  Outcome: Progressing

## 2020-03-15 NOTE — ASSESSMENT & PLAN NOTE
· Urinary tract infection associated with indwelling Haile catheter  · Continue IV zosyn and IV vancomycin based on the urine culture results    Urine culture [[id number]] (Abnormal)  Collected: 03/11/20 1143   Lab Status: Preliminary result Specimen: Urine, Indwelling Haile Catheter Updated: 03/13/20 1135    Urine Culture >100,000 cfu/ml Escherichia coli ESBLAbnormal     Comment: An Extended-Spectrum Beta-Lactamase is being produced by this organism (requires contact precautions)  Cephalosporins are NOT effective for treating these organisms  For SEVERE infections (i e  bacteremia, septic shock, etc ), Carbapenems are the drug of choice  For MILD infections (i e  isolated urinary or biliary infection), high-dose Zosyn may be used          10,000-19,000 cfu/ml Enterococcus faecalisAbnormal    Susceptibility     Escherichia coli ESBL (1)     Antibiotic Interpretation Microscan Method Status    Amikacin ($$) Susceptible <=16 ug/ml LUIS Preliminary    Amoxicillin + Clavulanate Susceptible 8/4 ug/ml LUIS Preliminary    Ampicillin ($$) Resistant >16 00 ug/ml LUIS Preliminary    Ampicillin + Sulbactam ($) Resistant >16/8 ug/ml LUIS Preliminary    Aztreonam ($$$)  Resistant >16 ug/ml LUIS Preliminary    Cefazolin ($) Resistant >16 00 ug/ml LUIS Preliminary    Cefepime ($) Resistant >16 00 ug/ml LUIS Preliminary    Cefotaxime ($) Resistant >32 00 ug/ml LUIS Preliminary    Ceftazidime ($$) Resistant >16 ug/ml LUIS Preliminary    Ceftriaxone ($$) Resistant >32 00 ug/ml LUIS Preliminary    Cefuroxime ($$) Resistant >16 ug/ml LUIS Preliminary    Ciprofloxacin ($)  Resistant >2 00 ug/ml LUIS Preliminary    Ertapenem ($$$) Susceptible <=0 5 ug/ml LUIS Preliminary    Gentamicin ($$) Resistant >8 ug/ml LUIS Preliminary    Levofloxacin ($) Resistant >4 00 ug/ml LUIS Preliminary    Nitrofurantoin Resistant >64 ug/ml LUIS Preliminary    Piperacillin + Tazobactam ($$$) Susceptible <=4 ug/ml LUIS Preliminary    Tetracycline Susceptible <=4 ug/ml LUIS Preliminary    Tobramycin ($) Intermediate 8 ug/ml LUIS Preliminary    Trimethoprim + Sulfamethoxazole ($$$) Susceptible <=2/38 ug/ml LUIS Preliminary    Enterococcus faecalis (2)     Antibiotic Interpretation Microscan Method Status    Ampicillin ($$) Susceptible <=2 00 ug/ml LUIS Preliminary    Levofloxacin ($) Resistant >4 00 ug/ml LUIS Preliminary    Nitrofurantoin Susceptible <=32 ug/ml LUIS Preliminary    Tetracycline Resistant >8 ug/ml LUIS Preliminary    Vancomycin ($) Susceptible 1 00 ug/ml LUIS Preliminary

## 2020-03-15 NOTE — ASSESSMENT & PLAN NOTE
· Recheck Clostridium difficile stool culture with the patient having sepsis  · Utilize vancomycin 125 mg PO every 6 hours while on IV antibiotic treatment    Clostridium difficile toxin by PCR with EIA [[id number]] (Abnormal) Collected: 03/03/20 2057   Lab Status: Final result Specimen: Stool from Per Rectum Updated: 03/04/20 1429    C difficile toxin by PCR PositiveAbnormal     Comment: Result suggests infection or colonization with C  difficile  EIA testing has been performed to clarify  Maintain strict contact and hand hygiene precautions  C difficile Toxins A+B, EIA Negative    Comment: Probable C  difficile colonization without active infection  Treatment recommended if severe diarrhea without alternate etiology  Maintain strict contact and hand hygiene precautions  This is an appended report  Ronnell Toro results have been appended to a previously preliminary verified report

## 2020-03-16 PROBLEM — R53.1 GENERALIZED WEAKNESS: Status: ACTIVE | Noted: 2020-03-16

## 2020-03-16 LAB
ANION GAP SERPL CALCULATED.3IONS-SCNC: 8 MMOL/L (ref 4–13)
ATRIAL RATE: 79 BPM
BACTERIA BLD CULT: NORMAL
BACTERIA BLD CULT: NORMAL
BUN SERPL-MCNC: 29 MG/DL (ref 5–25)
CALCIUM SERPL-MCNC: 8.7 MG/DL (ref 8.3–10.1)
CHLORIDE SERPL-SCNC: 104 MMOL/L (ref 100–108)
CO2 SERPL-SCNC: 28 MMOL/L (ref 21–32)
CREAT SERPL-MCNC: 2.12 MG/DL (ref 0.6–1.3)
ERYTHROCYTE [DISTWIDTH] IN BLOOD BY AUTOMATED COUNT: 15.2 % (ref 11.6–15.1)
GFR SERPL CREATININE-BSD FRML MDRD: 28 ML/MIN/1.73SQ M
GLUCOSE SERPL-MCNC: 163 MG/DL (ref 65–140)
GLUCOSE SERPL-MCNC: 164 MG/DL (ref 65–140)
GLUCOSE SERPL-MCNC: 299 MG/DL (ref 65–140)
GLUCOSE SERPL-MCNC: 306 MG/DL (ref 65–140)
GLUCOSE SERPL-MCNC: 394 MG/DL (ref 65–140)
HCT VFR BLD AUTO: 29 % (ref 36.5–49.3)
HGB BLD-MCNC: 9.3 G/DL (ref 12–17)
MAGNESIUM SERPL-MCNC: 2 MG/DL (ref 1.6–2.6)
MCH RBC QN AUTO: 30.3 PG (ref 26.8–34.3)
MCHC RBC AUTO-ENTMCNC: 32.1 G/DL (ref 31.4–37.4)
MCV RBC AUTO: 95 FL (ref 82–98)
P AXIS: 12 DEGREES
PLATELET # BLD AUTO: 162 THOUSANDS/UL (ref 149–390)
PMV BLD AUTO: 10.5 FL (ref 8.9–12.7)
POTASSIUM SERPL-SCNC: 4.5 MMOL/L (ref 3.5–5.3)
PR INTERVAL: 136 MS
QRS AXIS: -43 DEGREES
QRSD INTERVAL: 102 MS
QT INTERVAL: 382 MS
QTC INTERVAL: 438 MS
RBC # BLD AUTO: 3.07 MILLION/UL (ref 3.88–5.62)
SODIUM SERPL-SCNC: 140 MMOL/L (ref 136–145)
T WAVE AXIS: 25 DEGREES
VENTRICULAR RATE: 79 BPM
WBC # BLD AUTO: 7.62 THOUSAND/UL (ref 4.31–10.16)

## 2020-03-16 PROCEDURE — 99232 SBSQ HOSP IP/OBS MODERATE 35: CPT | Performed by: INTERNAL MEDICINE

## 2020-03-16 PROCEDURE — 85027 COMPLETE CBC AUTOMATED: CPT | Performed by: INTERNAL MEDICINE

## 2020-03-16 PROCEDURE — 82948 REAGENT STRIP/BLOOD GLUCOSE: CPT

## 2020-03-16 PROCEDURE — 83735 ASSAY OF MAGNESIUM: CPT | Performed by: INTERNAL MEDICINE

## 2020-03-16 PROCEDURE — 93010 ELECTROCARDIOGRAM REPORT: CPT | Performed by: INTERNAL MEDICINE

## 2020-03-16 PROCEDURE — 97530 THERAPEUTIC ACTIVITIES: CPT

## 2020-03-16 PROCEDURE — 80048 BASIC METABOLIC PNL TOTAL CA: CPT | Performed by: INTERNAL MEDICINE

## 2020-03-16 RX ORDER — SODIUM CHLORIDE 9 MG/ML
50 INJECTION, SOLUTION INTRAVENOUS CONTINUOUS
Status: DISCONTINUED | OUTPATIENT
Start: 2020-03-16 | End: 2020-03-17

## 2020-03-16 RX ADMIN — VANCOMYCIN HYDROCHLORIDE 125 MG: 500 INJECTION, POWDER, LYOPHILIZED, FOR SOLUTION INTRAVENOUS at 00:06

## 2020-03-16 RX ADMIN — VANCOMYCIN HYDROCHLORIDE 1000 MG: 1 INJECTION, POWDER, LYOPHILIZED, FOR SOLUTION INTRAVENOUS at 17:37

## 2020-03-16 RX ADMIN — INSULIN LISPRO 1 UNITS: 100 INJECTION, SOLUTION INTRAVENOUS; SUBCUTANEOUS at 08:40

## 2020-03-16 RX ADMIN — HEPARIN SODIUM 5000 UNITS: 5000 INJECTION INTRAVENOUS; SUBCUTANEOUS at 16:14

## 2020-03-16 RX ADMIN — VITAMIN D, TAB 1000IU (100/BT) 1000 UNITS: 25 TAB at 08:40

## 2020-03-16 RX ADMIN — PIPERACILLIN AND TAZOBACTAM 2.25 G: 2; .25 INJECTION, POWDER, FOR SOLUTION INTRAVENOUS at 00:06

## 2020-03-16 RX ADMIN — VANCOMYCIN HYDROCHLORIDE 125 MG: 500 INJECTION, POWDER, LYOPHILIZED, FOR SOLUTION INTRAVENOUS at 12:13

## 2020-03-16 RX ADMIN — INSULIN LISPRO 3 UNITS: 100 INJECTION, SOLUTION INTRAVENOUS; SUBCUTANEOUS at 12:13

## 2020-03-16 RX ADMIN — HEPARIN SODIUM 5000 UNITS: 5000 INJECTION INTRAVENOUS; SUBCUTANEOUS at 21:34

## 2020-03-16 RX ADMIN — SODIUM CHLORIDE, SODIUM GLUCONATE, SODIUM ACETATE, POTASSIUM CHLORIDE, MAGNESIUM CHLORIDE, SODIUM PHOSPHATE, DIBASIC, AND POTASSIUM PHOSPHATE 50 ML/HR: .53; .5; .37; .037; .03; .012; .00082 INJECTION, SOLUTION INTRAVENOUS at 04:54

## 2020-03-16 RX ADMIN — VANCOMYCIN HYDROCHLORIDE 125 MG: 500 INJECTION, POWDER, LYOPHILIZED, FOR SOLUTION INTRAVENOUS at 05:11

## 2020-03-16 RX ADMIN — Medication 1 CAPSULE: at 16:14

## 2020-03-16 RX ADMIN — SODIUM CHLORIDE 50 ML/HR: 0.9 INJECTION, SOLUTION INTRAVENOUS at 12:14

## 2020-03-16 RX ADMIN — PIPERACILLIN AND TAZOBACTAM 2.25 G: 2; .25 INJECTION, POWDER, FOR SOLUTION INTRAVENOUS at 16:14

## 2020-03-16 RX ADMIN — HEPARIN SODIUM 5000 UNITS: 5000 INJECTION INTRAVENOUS; SUBCUTANEOUS at 05:11

## 2020-03-16 RX ADMIN — INSULIN LISPRO 3 UNITS: 100 INJECTION, SOLUTION INTRAVENOUS; SUBCUTANEOUS at 17:37

## 2020-03-16 RX ADMIN — PIPERACILLIN AND TAZOBACTAM 2.25 G: 2; .25 INJECTION, POWDER, FOR SOLUTION INTRAVENOUS at 08:39

## 2020-03-16 RX ADMIN — ASPIRIN 81 MG 81 MG: 81 TABLET ORAL at 08:39

## 2020-03-16 RX ADMIN — INSULIN LISPRO 4 UNITS: 100 INJECTION, SOLUTION INTRAVENOUS; SUBCUTANEOUS at 21:32

## 2020-03-16 RX ADMIN — VANCOMYCIN HYDROCHLORIDE 125 MG: 500 INJECTION, POWDER, LYOPHILIZED, FOR SOLUTION INTRAVENOUS at 17:37

## 2020-03-16 RX ADMIN — VANCOMYCIN HYDROCHLORIDE 125 MG: 500 INJECTION, POWDER, LYOPHILIZED, FOR SOLUTION INTRAVENOUS at 23:49

## 2020-03-16 RX ADMIN — PIPERACILLIN AND TAZOBACTAM 2.25 G: 2; .25 INJECTION, POWDER, FOR SOLUTION INTRAVENOUS at 23:49

## 2020-03-16 NOTE — ASSESSMENT & PLAN NOTE
· Sepsis was present on admission and secondary to possible acute cystitis associated with an indwelling Haile catheter  · SIRS criteria was met with hypothermia and a leukocytosis  · Continue IV zosyn and IV vancomycin for a 7-day antibiotic course based on the urine culture results  · Serial laboratory testing to monitor the patient's renal function while on the combination of IV zosyn and IV vancomycin  · Continue IV fluids with NSS IV fluids at 50 ml/hr  · Follow culture results  · The lactic acid level was within normal limits    I discussed goals of care with the patient, and the patient wishes to be a Level 1-Full Code

## 2020-03-16 NOTE — ASSESSMENT & PLAN NOTE
Lab Results   Component Value Date    HGBA1C 6 6 (H) 03/12/2020       Recent Labs     03/15/20  1615 03/15/20  2009 03/16/20  0736 03/16/20  1124   POCGLU 206* 235* 163* 306*       Blood Sugar Average: Last 72 hrs:  (P) 211 1602     · The hypoglycemia resolved  · Restart basal insulin if there is no recurrent hypoglycemia  · Hypoglycemia protocol  · Follow the blood glucose trend

## 2020-03-16 NOTE — ASSESSMENT & PLAN NOTE
Malnutrition Findings:   Malnutrition type: Chronic illness  Degree of Malnutrition: Other severe protein calorie malnutrition    BMI Findings: Body mass index is 23 23 kg/m²       · Continue nutritional supplements per the dietitian's recommendations

## 2020-03-16 NOTE — PROGRESS NOTES
Progress Note - Saranya Jose 1940, 79 y o  male MRN: [id number]    Unit/Bed#: 415-01 Encounter: [id number]    Primary Care Provider: Erasto Berumen DO   Date and time admitted to hospital: 3/11/2020  9:12 AM        * Sepsis [facility])  Assessment & Plan  · Sepsis was present on admission and secondary to possible acute cystitis associated with an indwelling Haile catheter  · SIRS criteria was met with hypothermia and a leukocytosis  · Continue IV zosyn and IV vancomycin for a 7-day antibiotic course based on the urine culture results  · Serial laboratory testing to monitor the patient's renal function while on the combination of IV zosyn and IV vancomycin  · Continue IV fluids with NSS IV fluids at 50 ml/hr  · Follow culture results  · The lactic acid level was within normal limits    I discussed goals of care with the patient, and the patient wishes to be a Level 1-Full Code  Generalized weakness  Assessment & Plan  · The patient was evaluated by physical therapy and occupational therapy  · The patient requires short-term rehabilitation placement upon discharge  · The patient is trying to decide if he is willing to go to short-term rehabilitation  Bradycardia  Assessment & Plan  · Transient bradycardia on the Cardionet monitor on 03/15/2020, so I will restart telemetry monitoring  · Check an EKG if the bradycardia recurs    Acute cystitis with hematuria  Assessment & Plan  · Associated with indwelling Haile catheter  · Continue IV zosyn and IV vancomycin for a 7-day antibiotic course based on the urine culture results    Urine culture [[id number]] (Abnormal)  Collected: 03/11/20 1143   Lab Status: Preliminary result Specimen: Urine, Indwelling Haile Catheter Updated: 03/13/20 1135    Urine Culture >100,000 cfu/ml Escherichia coli ESBLAbnormal     Comment: An Extended-Spectrum Beta-Lactamase is being produced by this organism (requires contact precautions)     Cephalosporins are NOT effective for treating these organisms  For SEVERE infections (i e  bacteremia, septic shock, etc ), Carbapenems are the drug of choice  For MILD infections (i e  isolated urinary or biliary infection), high-dose Zosyn may be used          10,000-19,000 cfu/ml Enterococcus faecalisAbnormal    Susceptibility     Escherichia coli ESBL (1)     Antibiotic Interpretation Microscan Method Status    Amikacin ($$) Susceptible <=16 ug/ml LUIS Preliminary    Amoxicillin + Clavulanate Susceptible 8/4 ug/ml LUIS Preliminary    Ampicillin ($$) Resistant >16 00 ug/ml LUIS Preliminary    Ampicillin + Sulbactam ($) Resistant >16/8 ug/ml LUIS Preliminary    Aztreonam ($$$)  Resistant >16 ug/ml LUIS Preliminary    Cefazolin ($) Resistant >16 00 ug/ml LUIS Preliminary    Cefepime ($) Resistant >16 00 ug/ml LUIS Preliminary    Cefotaxime ($) Resistant >32 00 ug/ml LUIS Preliminary    Ceftazidime ($$) Resistant >16 ug/ml LUIS Preliminary    Ceftriaxone ($$) Resistant >32 00 ug/ml LUIS Preliminary    Cefuroxime ($$) Resistant >16 ug/ml LUIS Preliminary    Ciprofloxacin ($)  Resistant >2 00 ug/ml LUIS Preliminary    Ertapenem ($$$) Susceptible <=0 5 ug/ml LUIS Preliminary    Gentamicin ($$) Resistant >8 ug/ml LUIS Preliminary    Levofloxacin ($) Resistant >4 00 ug/ml LUIS Preliminary    Nitrofurantoin Resistant >64 ug/ml LUIS Preliminary    Piperacillin + Tazobactam ($$$) Susceptible <=4 ug/ml LUIS Preliminary    Tetracycline Susceptible <=4 ug/ml LUIS Preliminary    Tobramycin ($) Intermediate 8 ug/ml LUIS Preliminary    Trimethoprim + Sulfamethoxazole ($$$) Susceptible <=2/38 ug/ml LUIS Preliminary    Enterococcus faecalis (2)     Antibiotic Interpretation Microscan Method Status    Ampicillin ($$) Susceptible <=2 00 ug/ml LUIS Preliminary    Levofloxacin ($) Resistant >4 00 ug/ml LUIS Preliminary    Nitrofurantoin Susceptible <=32 ug/ml LUIS Preliminary    Tetracycline Resistant >8 ug/ml LUIS Preliminary    Vancomycin ($) Susceptible 1 00 ug/ml LUIS Preliminary Syncope and collapse  Assessment & Plan  · Episode in the [facility] while receiving lanreotide infusion  · Troponin levels x 3 sets were within normal limits  · Transient bradycardia on the Cardionet monitor on 03/15/2020, so I will restart telemetry monitoring  · Check an EKG if the bradycardia recurs    Echo complete with contrast if indicated   Status: Final result   PACS Images      Show images for Echo complete with contrast if indicated   Study Result     [address]  [address], [address]  [phone]     Transthoracic Echocardiogram  2D, M-mode, Doppler, and Color Doppler     Study date:  12-Mar-2020     Patient: Benji Wise  MR number: [id number]  Account number: [de-identified]  : 1940  Age: 79 years  Gender: Male  Status: Inpatient  Location: Bedside  Height: 68 in  Weight: 146 lb  BP: 149/ 72 mmHg     Indications: syncope, sepsis     Diagnoses: A41 9 - Sepsis, unspecified, R55  - Syncope and collapse     Sonographer:  Fabienne Briseno RDCS  Referring Physician:  João Mcintosh DO  Group:  [facility]  Interpreting Physician:  Courtney Ramirez DO     SUMMARY     LEFT VENTRICLE:  Systolic function was normal  Ejection fraction was estimated to be 60 %  Although no diagnostic regional wall motion abnormality was identified, this possibility cannot be completely excluded on the basis of this study  Doppler parameters were consistent with abnormal left ventricular relaxation (grade 1 diastolic dysfunction)      TRICUSPID VALVE:  There was mild regurgitation      HISTORY: PRIOR HISTORY: diabetes mellitus, chronic kidney disease, mixed hyperlipidemia, hypertension     PROCEDURE: The procedure was performed at the bedside  This was a routine study  The transthoracic approach was used  The study included complete 2D imaging, M-mode, complete spectral Doppler, and color Doppler   Images were obtained from  the parasternal, apical, subcostal, and suprasternal notch acoustic windows  Echocardiographic views were limited due to poor acoustic window availability, decreased penetration, and lung interference  This was a technically difficult  study      LEFT VENTRICLE: Size was normal  Systolic function was normal  Ejection fraction was estimated to be 60 %  Although no diagnostic regional wall motion abnormality was identified, this possibility cannot be completely excluded on the basis  of this study  Wall thickness was normal  DOPPLER: Doppler parameters were consistent with abnormal left ventricular relaxation (grade 1 diastolic dysfunction)      RIGHT VENTRICLE: The size was normal  Systolic function was normal  Wall thickness was normal      LEFT ATRIUM: Size was normal      RIGHT ATRIUM: Size was normal      MITRAL VALVE: Valve structure was normal  There was normal leaflet separation  DOPPLER: The transmitral velocity was within the normal range  There was no evidence for stenosis  There was no regurgitation      AORTIC VALVE: The valve was trileaflet  Leaflets exhibited normal thickness and normal cuspal separation  DOPPLER: Transaortic velocity was within the normal range  There was no evidence for stenosis  There was no regurgitation      TRICUSPID VALVE: The valve structure was normal  There was normal leaflet separation  DOPPLER: The transtricuspid velocity was within the normal range  There was no evidence for stenosis  There was mild regurgitation      PULMONIC VALVE: Leaflets exhibited normal thickness, no calcification, and normal cuspal separation  DOPPLER: The transpulmonic velocity was within the normal range  There was no regurgitation      PERICARDIUM: There was no pericardial effusion   The pericardium was normal in appearance      AORTA: The root exhibited normal size      SYSTEMIC VEINS: IVC: The inferior vena cava was not well visualized      SYSTEM MEASUREMENT TABLES     2D  %FS: 42 08 %  Ao Diam: 3 14 cm  EDV(Teich): 101 7 ml  EF(Teich): 73 08 %  ESV(Teich): 27 38 ml  IVSd: 1 07 cm  LA Area: 17 16 cm2  LA Diam: 3 71 cm  LVEDV MOD A4C: 76 23 ml  LVEF MOD A4C: 65 07 %  LVESV MOD A4C: 26 63 ml  LVIDd: 4 69 cm  LVIDs: 2 71 cm  LVLd A4C: 7 68 cm  LVLs A4C: 6 83 cm  LVOT Diam: 2 03 cm  LVPWd: 1 16 cm  RA Area: 12 3 cm2  RVIDd: 2 57 cm  RWT: 0 5  SV MOD A4C: 49 6 ml  SV(Teich): 74 32 ml     CW  AV Vmax: 1 48 m/s  AV maxP 83 mmHg  PV Vmax: 1 29 m/s  PV maxP 67 mmHg  TR Vmax: 2 93 m/s  TR maxP 44 mmHg     MM  TAPSE: 2 6 cm     PW  PARIS Vmax, Pt: 1 97 cm2  E' Av 06 m/s  E' Lat: 0 07 m/s  E' Sept: 0 04 m/s  E/E' Av 89  E/E' Lat: 10 6  E/E' Sept: 16 42  LVOT Vmax: 0 9 m/s  LVOT maxPG: 3 22 mmHg  MV A [place]: 1 14 m/s  MV Dec [place]: 2 51 m/s2  MV DecT: 287 61 ms  MV E [place]: 0 72 m/s  MV E/A Ratio: 0 63  RVOT Vmax: 0 7 m/s  RVOT maxP 99 mmHg     [facility] Accredited Echocardiography Laboratory     Prepared and electronically signed by  Araceli Starkey DO  Signed 12-Mar-2020 15:54:24     Results for Jacob Culp (MRN [id number]) as of 3/16/2020 12:44   Ref  Range 3/12/2020 05:49 3/12/2020 05:49 3/12/2020 07:38 3/12/2020 11:19 3/12/2020 15:56 3/12/2020 20:33 3/13/2020 05:26   TSH 3RD GENERATON Latest Ref Range: 0 358 - 3 740 uIU/mL 0 205 (L)         Free T4 Latest Ref Range: 0 76 - 1 46 ng/dL       1 08       Premature atrial complexes  Assessment & Plan  · Outpatient follow-up with Cardiology    Diastolic dysfunction  Assessment & Plan  · Monitor the patient's volume status closely    Severe protein-calorie malnutrition (HCC)  Assessment & Plan  Malnutrition Findings:   Malnutrition type: Chronic illness  Degree of Malnutrition: Other severe protein calorie malnutrition    BMI Findings: Body mass index is 23 23 kg/m²       · Continue nutritional supplements per the dietitian's recommendations    Low TSH level  Assessment & Plan  · Recheck a TSH level in 1-2 weeks with the patient's PCP after discharge    Results for Usha Locke (MRN [id number]) as of 3/13/2020 16:28   Ref  Range 3/13/2020 05:26   Free T4 Latest Ref Range: 0 76 - 1 46 ng/dL 1 08       Results for Usha Locke (MRN [id number]) as of 3/12/2020 21:31   Ref  Range 3/12/2020 05:49   TSH 3RD GENERATON Latest Ref Range: 0 358 - 3 740 uIU/mL 0 205 (L)       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  Assessment & Plan  · Continue vancomycin 125 mg PO every 6 hours while on IV antibiotic treatment    Clostridium difficile toxin by PCR with EIA [[id number]] (Abnormal) Collected: 03/03/20 2057   Lab Status: Final result Specimen: Stool from Per Rectum Updated: 03/04/20 1429    C difficile toxin by PCR PositiveAbnormal     Comment: Result suggests infection or colonization with C  difficile  EIA testing has been performed to clarify  Maintain strict contact and hand hygiene precautions  C difficile Toxins A+B, EIA Negative    Comment: Probable C  difficile colonization without active infection  Treatment recommended if severe diarrhea without alternate etiology  Maintain strict contact and hand hygiene precautions  This is an appended report  Michelleviv Ravi results have been appended to a previously preliminary verified report  Clostridium difficile toxin by PCR with EIA [[id number]] (Normal) Collected: 03/11/20 1910   Lab Status: Final result Specimen: Stool from Per Rectum Updated: 03/12/20 1127    C difficile toxin by PCR Negative    Comment: No evidence for C  difficile colonization or infection   No special contact precautions required  Elevated total protein  Assessment & Plan  · Likely secondary to volume depletion  · Resolved  · Follow the total protein level    Results for Usha Locke (MRN [id number]) as of 3/16/2020 12:44   Ref   Range 3/12/2020 05:49   ALBUMIN ELP Latest Ref Range: 52 0 - 65 0 % 49 3 (L)   ALBUMIN CONC ELP Latest Ref Range: 3 50 - 5 00 g/dl 2 86 (L)   ALPHA 1 Latest Ref Range: 2 5 - 5 0 % 8 5 (H)   ALPHA 2 Latest Ref Range: 7 0 - 13 0 % 17 9 (H)   ALPHA 2 CONC ELP Latest Ref Range: 0 40 - 1 20 g/dL 1 04   BETA-1 Latest Ref Range: 5 0 - 13 0 % 6 3   BETA 1 CONC ELP Latest Ref Range: 0 40 - 0 80 g/dL 0 37 (L)   BETA-2 Latest Ref Range: 2 0 - 8 0 % 7 0   BETA 2 CONC ELP Latest Ref Range: 0 20 - 0 50 g/dL 0 41   GAMMA GLOBULIN Latest Ref Range: 12 0 - 22 0 % 11 0 (L)   GAMMA CONC ELP Latest Ref Range: 0 50 - 1 60 g/dL 0 64   SPEP INTERPRETATION Unknown No monoclonal bands noted  Reviewed by: Luz Echeverria MD  ([id number])  **Electronic Signature**     The UPEP shows non-selective proteinuria  No monoclonal bands noted  Reviewed by: Luz Echeverria MD ([id number])     Hyperkalemia  Assessment & Plan  · Hemolyzed specimen  · Resolved  · Follow the potassium level    Stage 4 chronic kidney disease (HCC)  Assessment & Plan  · Baseline serum creatinine of 1 9-2 4 mg/dl  · Avoid all nephrotoxic agents  · Serial laboratory testing to monitor the patient's renal function and electrolytes    Chronic indwelling Haile catheter  Assessment & Plan  · Urinary tract infection associated with indwelling Haile catheter  · Continue IV zosyn and IV vancomycin for a 7-day antibiotic course based on the urine culture results    Urine culture [[id number]] (Abnormal)  Collected: 03/11/20 1143   Lab Status: Preliminary result Specimen: Urine, Indwelling Haile Catheter Updated: 03/13/20 1135    Urine Culture >100,000 cfu/ml Escherichia coli ESBLAbnormal     Comment: An Extended-Spectrum Beta-Lactamase is being produced by this organism (requires contact precautions)  Cephalosporins are NOT effective for treating these organisms  For SEVERE infections (i e  bacteremia, septic shock, etc ), Carbapenems are the drug of choice  For MILD infections (i e  isolated urinary or biliary infection), high-dose Zosyn may be used          10,000-19,000 cfu/ml Enterococcus faecalisAbnormal    Susceptibility     Escherichia coli ESBL (1) Antibiotic Interpretation Microscan Method Status    Amikacin ($$) Susceptible <=16 ug/ml LUIS Preliminary    Amoxicillin + Clavulanate Susceptible 8/4 ug/ml LUIS Preliminary    Ampicillin ($$) Resistant >16 00 ug/ml LUIS Preliminary    Ampicillin + Sulbactam ($) Resistant >16/8 ug/ml LUIS Preliminary    Aztreonam ($$$)  Resistant >16 ug/ml LUIS Preliminary    Cefazolin ($) Resistant >16 00 ug/ml LUIS Preliminary    Cefepime ($) Resistant >16 00 ug/ml LUIS Preliminary    Cefotaxime ($) Resistant >32 00 ug/ml LUIS Preliminary    Ceftazidime ($$) Resistant >16 ug/ml LUIS Preliminary    Ceftriaxone ($$) Resistant >32 00 ug/ml LUIS Preliminary    Cefuroxime ($$) Resistant >16 ug/ml LUIS Preliminary    Ciprofloxacin ($)  Resistant >2 00 ug/ml LUIS Preliminary    Ertapenem ($$$) Susceptible <=0 5 ug/ml LUIS Preliminary    Gentamicin ($$) Resistant >8 ug/ml LUIS Preliminary    Levofloxacin ($) Resistant >4 00 ug/ml LUIS Preliminary    Nitrofurantoin Resistant >64 ug/ml LUIS Preliminary    Piperacillin + Tazobactam ($$$) Susceptible <=4 ug/ml LUIS Preliminary    Tetracycline Susceptible <=4 ug/ml LUIS Preliminary    Tobramycin ($) Intermediate 8 ug/ml LUIS Preliminary    Trimethoprim + Sulfamethoxazole ($$$) Susceptible <=2/38 ug/ml LUIS Preliminary    Enterococcus faecalis (2)     Antibiotic Interpretation Microscan Method Status    Ampicillin ($$) Susceptible <=2 00 ug/ml LUIS Preliminary    Levofloxacin ($) Resistant >4 00 ug/ml LUIS Preliminary    Nitrofurantoin Susceptible <=32 ug/ml LUIS Preliminary    Tetracycline Resistant >8 ug/ml LUIS Preliminary    Vancomycin ($) Susceptible 1 00 ug/ml LUIS Preliminary                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  · Receiving lanreotide infusions as an outpatient with Hematology/Oncology    Type 2 diabetes mellitus with hypoglycemia without coma, with long-term current use of insulin [facility])  Assessment & Plan  Lab Results   Component Value Date HGBA1C 6 6 (H) 2020       Recent Labs     03/15/20  1615 03/15/20  2009 03/16/20  0736 20  1124   POCGLU 206* 235* 163* 306*       Blood Sugar Average: Last 72 hrs:  (P) 211 6875     · The hypoglycemia resolved  · Restart basal insulin if there is no recurrent hypoglycemia  · Hypoglycemia protocol  · Follow the blood glucose trend        VTE Pharmacologic Prophylaxis:   Pharmacologic: Heparin 5000 Units SQ every 8 hours  Mechanical VTE Prophylaxis in Place: Yes    Patient Centered Rounds: I have performed bedside rounds with nursing staff today  Time Spent for Care: 30 minutes  More than 50% of total time spent on counseling and coordination of care as described above  Current Length of Stay: 5 day(s)    Current Patient Status: Inpatient   Certification Statement: The patient will continue to require additional inpatient hospital stay due to the need for IV antibiotic treatment  Code Status: Level 1 - Full Code      Subjective: The patient was seen and examined  The patient complains of multiple episodes of diarrhea this morning  No chest pain  No shortness of breath  Objective:     Vitals:   Temp (24hrs), Av °F (36 7 °C), Min:97 3 °F (36 3 °C), Max:98 5 °F (36 9 °C)    Temp:  [97 3 °F (36 3 °C)-98 5 °F (36 9 °C)] 97 5 °F (36 4 °C)  HR:  [66-71] 71  Resp:  [18-19] 18  BP: (164-173)/(77-80) 164/77  SpO2:  [99 %-100 %] 100 %  Body mass index is 23 23 kg/m²  Input and Output Summary (last 24 hours):        Intake/Output Summary (Last 24 hours) at 3/16/2020 1307  Last data filed at 3/16/2020 0454  Gross per 24 hour   Intake 1542 5 ml   Output 2100 ml   Net -557 5 ml       Physical Exam:     Physical Exam  General:  NAD, follows commands  HEENT:  NC/AT, mucous membranes moist  Neck:  Supple, No JVP elevation  CV:  + S1, + S2, RRR  Pulm:  Lung fields are CTA bilaterally  Abd:  Soft, Non-tender, Non-distended  Ext:  No clubbing/cyanosis/edema  Skin:  No rashes  Neuro:  Awake, alert, oriented  Psych:  Normal mood and affect  :  Haile catheter in place      Additional Data:    Labs:    Results from last 7 days   Lab Units 03/16/20  0451 03/15/20  0455   WBC Thousand/uL 7 62 7 91   HEMOGLOBIN g/dL 9 3* 8 8*   HEMATOCRIT % 29 0* 27 1*   PLATELETS Thousands/uL 162 158   NEUTROS PCT %  --  70   LYMPHS PCT %  --  17   MONOS PCT %  --  9   EOS PCT %  --  3     Results from last 7 days   Lab Units 03/16/20  0451 03/15/20  0455   SODIUM mmol/L 140 141   POTASSIUM mmol/L 4 5 4 2   CHLORIDE mmol/L 104 105   CO2 mmol/L 28 25   BUN mg/dL 29* 29*   CREATININE mg/dL 2 12* 2 12*   ANION GAP mmol/L 8 11   CALCIUM mg/dL 8 7 8 6   ALBUMIN g/dL  --  2 1*   TOTAL BILIRUBIN mg/dL  --  0 40   ALK PHOS U/L  --  145*   ALT U/L  --  26   AST U/L  --  17   GLUCOSE RANDOM mg/dL 164* 69         Results from last 7 days   Lab Units 03/16/20  1124 03/16/20  0736 03/15/20  2009 03/15/20  1615 03/15/20  1126 03/15/20  0810 03/15/20  0718 03/14/20  2043 03/14/20  1606 03/14/20  1124 03/14/20  0829 03/14/20  0746   POC GLUCOSE mg/dl 306* 163* 235* 206* 289* 114 44* 250* 244* 258* 137 48*     Results from last 7 days   Lab Units 03/12/20  0549   HEMOGLOBIN A1C % 6 6*     Results from last 7 days   Lab Units 03/15/20  0455 03/14/20  0504 03/13/20  0526 03/12/20  0549 03/11/20  1139   LACTIC ACID mmol/L  --   --   --  1 2 1 3   PROCALCITONIN ng/ml 0 23 0 47* 0 70* 1 23*  --            * I Have Reviewed All Lab Data Listed Above  * Additional Pertinent Lab Tests Reviewed: Laura 66 Admission Reviewed      Recent Cultures (last 7 days):     Results from last 7 days   Lab Units 03/11/20  1910 03/11/20  1143 03/11/20  1140 03/11/20  1139   BLOOD CULTURE   --   --  No Growth After 4 Days  No Growth After 4 Days     URINE CULTURE   --  >100,000 cfu/ml Escherichia coli ESBL*  10,000-19,000 cfu/ml Enterococcus faecalis*  50,000-59,000 cfu/ml Candida parapsilosis*  --   --    C DIFF TOXIN B  Negative  --   --   -- Last 24 Hours Medication List:     Current Facility-Administered Medications:  acetaminophen 650 mg Oral Q6H PRN Mannie Mann DO    aspirin 81 mg Oral Daily Mannie Mann DO    b complex-vitamin C-folic acid 1 capsule Oral Daily With Siddhartha Jimenez DO    cholecalciferol 1,000 Units Oral Daily Mannie Mann DO    dextrose 25 mL Intravenous PRN Mannie Mann DO    heparin (porcine) 5,000 Units Subcutaneous Q8H 6225 Franklyn Dodson DO    insulin lispro 1-5 Units Subcutaneous TID AC Mannie Mann DO    insulin lispro 1-5 Units Subcutaneous HS Mannie Mann DO    piperacillin-tazobactam 2 25 g Intravenous Q8H Mannie Mann DO Last Rate: 2 25 g (03/16/20 0839)   sodium chloride 50 mL/hr Intravenous Continuous Mannie Mann DO Last Rate: 50 mL/hr (03/16/20 1214)   vancomycin 1,000 mg Intravenous Q24H Mannie Mann DO Last Rate: 1,000 mg (03/15/20 1548)   vancomycin 125 mg Oral Q6H 6225 Franklyn Dodson DO         Today, Patient Was Seen By: Mannie Mann DO    ** Please Note: Dictation voice to text software may have been used in the creation of this document   **

## 2020-03-16 NOTE — PROGRESS NOTES
Vancomycin IV Pharmacy-to-Dose Consultation    Yuko Gilman is a 79 y o  male who is currently receiving Vancomycin IV with management by the Pharmacy Consult service  Assessment/Plan:  The patient was reviewed  Renal function is stable and no signs or symptoms of nephrotoxicity and/or infusion reactions were documented in the chart  Based on todays assessment, continue current vancomycin (day # 6) dosing of 1000mg Q24, with a plan for trough to be drawn at 0930 on 3/17/20  We will continue to follow the patients culture results and clinical progress daily      Vilma Sethi, Pharmacist

## 2020-03-16 NOTE — PLAN OF CARE
Problem: PHYSICAL THERAPY ADULT  Goal: Performs mobility at highest level of function for planned discharge setting  See evaluation for individualized goals  Description  Treatment/Interventions: Functional transfer training, LE strengthening/ROM, Therapeutic exercise, Endurance training, Bed mobility, Gait training          See flowsheet documentation for full assessment, interventions and recommendations  Outcome: Progressing  Note:   Prognosis: Guarded  Problem List: Decreased strength, Decreased endurance, Impaired balance, Decreased mobility  Assessment: Pt seen for PT treatment session this date with interventions consisting of therapeutic activity consisting of training: bed mobility, supine<>sit transfers and therapeutic exercises, see above  Pt agreeable to PT treatment session upon arrival, pt found supine in bed w/ HOB elevated, in no apparent distress and responsive  Pt does not show improvement with activity with refusal to OOB in chair after encouragement  Post session: pt returned BTB and all needs in reach Continue to recommend Short term skilled PT at time of d/c in order to maximize pt's functional independence and safety w/ mobility  Pt continues to be functioning below baseline level, and remains limited 2* factors listed above and including decreased functional mobilty  PT will continue to see pt while here in order to address the deficits listed above and provide interventions consistent w/ POC in effort to achieve STGs  Recommendation: Short-term skilled PT          See flowsheet documentation for full assessment

## 2020-03-16 NOTE — PHYSICAL THERAPY NOTE
PHYSICAL THERAPY NOTE          Patient Name: Lexie Hensley  NNUPL'[id number] Date: 3/16/2020     03/16/20 0949   Pain Assessment   Pain Assessment Tool 0-10   Pain Score No Pain   Restrictions/Precautions   Weight Bearing Precautions Per Order No   Other Precautions O2;Fall Risk;Multiple lines;Telemetry; Bed Alarm;Contact/isolation   General   Chart Reviewed Yes   Family/Caregiver Present No   Cognition   Overall Cognitive Status WFL   Arousal/Participation Alert; Cooperative   Attention Within functional limits   Orientation Level Oriented X4   Memory Within functional limits   Following Commands Follows one step commands without difficulty   Subjective   Subjective to get better   Bed Mobility   Rolling R 4  Minimal assistance   Additional items Assist x 1; Increased time required;Verbal cues; Bedrails   Rolling L 4  Minimal assistance   Additional items Assist x 1;HOB elevated; Increased time required;Verbal cues   Supine to Sit 3  Moderate assistance   Additional items Assist x 1; Increased time required;Verbal cues; Bedrails;HOB elevated   Sit to Supine 3  Moderate assistance   Additional items Assist x 1;Verbal cues;LE management; Increased time required   Additional Comments Pt refused OOB in chair at this time   Transfers   Additional Comments Pt refuses transftering at this time   Balance   Static Sitting Fair +   Endurance Deficit   Endurance Deficit Yes   Endurance Deficit Description Pt extremely fatigued with minimal exertion   Activity Tolerance   Activity Tolerance Patient limited by fatigue   Exercises   Quad Sets Supine;20 reps;AROM   Heelslides Supine;20 reps;AROM   Glute Sets Supine;20 reps;AROM   Hip Abduction Supine;20 reps;AROM   Hip Adduction Supine;20 reps;AROM   Knee AROM Short Arc Quad Supine;20 reps;AROM   Ankle Pumps Supine;20 reps;AROM   Assessment   Prognosis Guarded   Problem List Decreased strength;Decreased endurance; Impaired balance;Decreased mobility   Assessment Pt seen for PT treatment session this date with interventions consisting of therapeutic activity consisting of training: bed mobility, supine<>sit transfers and therapeutic exercises, see above  Pt agreeable to PT treatment session upon arrival, pt found supine in bed w/ HOB elevated, in no apparent distress and responsive  Pt does not show improvement with activity with refusal to OOB in chair after encouragement  Post session: pt returned BTB and all needs in reach Continue to recommend Short term skilled PT at time of d/c in order to maximize pt's functional independence and safety w/ mobility  Pt continues to be functioning below baseline level, and remains limited 2* factors listed above and including decreased functional mobilty  PT will continue to see pt while here in order to address the deficits listed above and provide interventions consistent w/ POC in effort to achieve STGs  Goals   Patient Goals to get better   LTG Expiration Date 03/26/20   PT Treatment Day 2   Plan   Treatment/Interventions Functional transfer training; Therapeutic exercise; Endurance training;Bed mobility;Gait training;LE strengthening/ROM   Progress Slow progress, decreased activity tolerance   PT Frequency   (3-5x/wk)   Recommendation   Recommendation Short-term skilled PT     Progress was dicussed with the PT

## 2020-03-16 NOTE — ASSESSMENT & PLAN NOTE
· The patient was evaluated by physical therapy and occupational therapy  · The patient requires short-term rehabilitation placement upon discharge  · The patient is trying to decide if he is willing to go to short-term rehabilitation

## 2020-03-16 NOTE — ASSESSMENT & PLAN NOTE
· Transient bradycardia on the Cardionet monitor on 03/15/2020, so I will restart telemetry monitoring  · Check an EKG if the bradycardia recurs

## 2020-03-16 NOTE — ASSESSMENT & PLAN NOTE
· Likely secondary to volume depletion  · Resolved  · Follow the total protein level    Results for Usha Locke (MRN [id number]) as of 3/16/2020 12:44   Ref  Range 3/12/2020 05:49   ALBUMIN ELP Latest Ref Range: 52 0 - 65 0 % 49 3 (L)   ALBUMIN CONC ELP Latest Ref Range: 3 50 - 5 00 g/dl 2 86 (L)   ALPHA 1 Latest Ref Range: 2 5 - 5 0 % 8 5 (H)   ALPHA 2 Latest Ref Range: 7 0 - 13 0 % 17 9 (H)   ALPHA 2 CONC ELP Latest Ref Range: 0 40 - 1 20 g/dL 1 04   BETA-1 Latest Ref Range: 5 0 - 13 0 % 6 3   BETA 1 CONC ELP Latest Ref Range: 0 40 - 0 80 g/dL 0 37 (L)   BETA-2 Latest Ref Range: 2 0 - 8 0 % 7 0   BETA 2 CONC ELP Latest Ref Range: 0 20 - 0 50 g/dL 0 41   GAMMA GLOBULIN Latest Ref Range: 12 0 - 22 0 % 11 0 (L)   GAMMA CONC ELP Latest Ref Range: 0 50 - 1 60 g/dL 0 64   SPEP INTERPRETATION Unknown No monoclonal bands noted  Reviewed by: Jeramy Domínguez MD  ([id number])  **Electronic Signature**     The UPEP shows non-selective proteinuria  No monoclonal bands noted   Reviewed by: Jeramy Domínguez MD ([id number])

## 2020-03-16 NOTE — ASSESSMENT & PLAN NOTE
· Recheck a TSH level in 1-2 weeks with the patient's PCP after discharge    Results for Odette Foss (MRN [id number]) as of 3/13/2020 16:28   Ref  Range 3/13/2020 05:26   Free T4 Latest Ref Range: 0 76 - 1 46 ng/dL 1 08       Results for Odette Foss (MRN [id number]) as of 3/12/2020 21:31   Ref   Range 3/12/2020 05:49   TSH 3RD GENERATON Latest Ref Range: 0 358 - 3 740 uIU/mL 0 205 (L)

## 2020-03-16 NOTE — ASSESSMENT & PLAN NOTE
· Episode in the [facility] while receiving lanreotide infusion  · Troponin levels x 3 sets were within normal limits  · Transient bradycardia on the Cardionet monitor on 03/15/2020, so I will restart telemetry monitoring  · Check an EKG if the bradycardia recurs    Echo complete with contrast if indicated   Status: Final result   PACS Images      Show images for Echo complete with contrast if indicated   Study Result     [address]  [address], [place] [address]  [phone]     Transthoracic Echocardiogram  2D, M-mode, Doppler, and Color Doppler     Study date:  12-Mar-2020     Patient: Ludmila Martin  MR number: [id number]  Account number: [de-identified]  : 1940  Age: 79 years  Gender: Male  Status: Inpatient  Location: Bedside  Height: 68 in  Weight: 146 lb  BP: 149/ 72 mmHg     Indications: syncope, sepsis     Diagnoses: A41 9 - Sepsis, unspecified, R55  - Syncope and collapse     Sonographer:  Ashwin Haro RDCS  Referring Physician:  Lb Mcbride DO  Group:  [facility]  Interpreting Physician:  Amaris Wise DO     SUMMARY     LEFT VENTRICLE:  Systolic function was normal  Ejection fraction was estimated to be 60 %  Although no diagnostic regional wall motion abnormality was identified, this possibility cannot be completely excluded on the basis of this study  Doppler parameters were consistent with abnormal left ventricular relaxation (grade 1 diastolic dysfunction)      TRICUSPID VALVE:  There was mild regurgitation      HISTORY: PRIOR HISTORY: diabetes mellitus, chronic kidney disease, mixed hyperlipidemia, hypertension     PROCEDURE: The procedure was performed at the bedside  This was a routine study  The transthoracic approach was used  The study included complete 2D imaging, M-mode, complete spectral Doppler, and color Doppler   Images were obtained from  the parasternal, apical, subcostal, and suprasternal notch acoustic windows  Echocardiographic views were limited due to poor acoustic window availability, decreased penetration, and lung interference  This was a technically difficult  study      LEFT VENTRICLE: Size was normal  Systolic function was normal  Ejection fraction was estimated to be 60 %  Although no diagnostic regional wall motion abnormality was identified, this possibility cannot be completely excluded on the basis  of this study  Wall thickness was normal  DOPPLER: Doppler parameters were consistent with abnormal left ventricular relaxation (grade 1 diastolic dysfunction)      RIGHT VENTRICLE: The size was normal  Systolic function was normal  Wall thickness was normal      LEFT ATRIUM: Size was normal      RIGHT ATRIUM: Size was normal      MITRAL VALVE: Valve structure was normal  There was normal leaflet separation  DOPPLER: The transmitral velocity was within the normal range  There was no evidence for stenosis  There was no regurgitation      AORTIC VALVE: The valve was trileaflet  Leaflets exhibited normal thickness and normal cuspal separation  DOPPLER: Transaortic velocity was within the normal range  There was no evidence for stenosis  There was no regurgitation      TRICUSPID VALVE: The valve structure was normal  There was normal leaflet separation  DOPPLER: The transtricuspid velocity was within the normal range  There was no evidence for stenosis  There was mild regurgitation      PULMONIC VALVE: Leaflets exhibited normal thickness, no calcification, and normal cuspal separation  DOPPLER: The transpulmonic velocity was within the normal range  There was no regurgitation      PERICARDIUM: There was no pericardial effusion   The pericardium was normal in appearance      AORTA: The root exhibited normal size      SYSTEMIC VEINS: IVC: The inferior vena cava was not well visualized      SYSTEM MEASUREMENT TABLES     2D  %FS: 42 08 %  Ao Diam: 3 14 cm  EDV(Teich): 101 7 ml  EF(Teich): 73 08 %  ESV(Teich): 27 38 ml  IVSd: 1 07 cm  LA Area: 17 16 cm2  LA Diam: 3 71 cm  LVEDV MOD A4C: 76 23 ml  LVEF MOD A4C: 65 07 %  LVESV MOD A4C: 26 63 ml  LVIDd: 4 69 cm  LVIDs: 2 71 cm  LVLd A4C: 7 68 cm  LVLs A4C: 6 83 cm  LVOT Diam: 2 03 cm  LVPWd: 1 16 cm  RA Area: 12 3 cm2  RVIDd: 2 57 cm  RWT: 0 5  SV MOD A4C: 49 6 ml  SV(Teich): 74 32 ml     CW  AV Vmax: 1 48 m/s  AV maxP 83 mmHg  PV Vmax: 1 29 m/s  PV maxP 67 mmHg  TR Vmax: 2 93 m/s  TR maxP 44 mmHg     MM  TAPSE: 2 6 cm     PW  PARIS Vmax, Pt: 1 97 cm2  E' Av 06 m/s  E' Lat: 0 07 m/s  E' Sept: 0 04 m/s  E/E' Av 89  E/E' Lat: 10 6  E/E' Sept: 16 42  LVOT Vmax: 0 9 m/s  LVOT maxPG: 3 22 mmHg  MV A [place]: 1 14 m/s  MV Dec [place]: 2 51 m/s2  MV DecT: 287 61 ms  MV E Antonio: 0 72 m/s  MV E/A Ratio: 0 63  RVOT Vmax: 0 7 m/s  RVOT maxP 99 mmHg     [facility] Accredited Echocardiography Laboratory     Prepared and electronically signed by  Marco A Gutierrez DO  Signed 12-Mar-2020 15:54:24     Results for Nicola Plummer (MRN [id number]) as of 3/16/2020 12:44   Ref   Range 3/12/2020 05:49 3/12/2020 05:49 3/12/2020 07:38 3/12/2020 11:19 3/12/2020 15:56 3/12/2020 20:33 3/13/2020 05:26   TSH 3RD GENERATON Latest Ref Range: 0 358 - 3 740 uIU/mL 0 205 (L)         Free T4 Latest Ref Range: 0 76 - 1 46 ng/dL       1 08

## 2020-03-16 NOTE — ASSESSMENT & PLAN NOTE
· Continue vancomycin 125 mg PO every 6 hours while on IV antibiotic treatment    Clostridium difficile toxin by PCR with EIA [[id number]] (Abnormal) Collected: 03/03/20 2057   Lab Status: Final result Specimen: Stool from Per Rectum Updated: 03/04/20 1429    C difficile toxin by PCR PositiveAbnormal     Comment: Result suggests infection or colonization with C  difficile  EIA testing has been performed to clarify  Maintain strict contact and hand hygiene precautions  C difficile Toxins A+B, EIA Negative    Comment: Probable C  difficile colonization without active infection  Treatment recommended if severe diarrhea without alternate etiology  Maintain strict contact and hand hygiene precautions  This is an appended report  Averail results have been appended to a previously preliminary verified report  Clostridium difficile toxin by PCR with EIA [[id number]] (Normal) Collected: 03/11/20 1910   Lab Status: Final result Specimen: Stool from Per Rectum Updated: 03/12/20 1127    C difficile toxin by PCR Negative    Comment: No evidence for C  difficile colonization or infection   No special contact precautions required

## 2020-03-17 LAB
ALBUMIN SERPL BCP-MCNC: 2.1 G/DL (ref 3.5–5)
ALP SERPL-CCNC: 152 U/L (ref 46–116)
ALT SERPL W P-5'-P-CCNC: 23 U/L (ref 12–78)
ANION GAP SERPL CALCULATED.3IONS-SCNC: 8 MMOL/L (ref 4–13)
AST SERPL W P-5'-P-CCNC: 14 U/L (ref 5–45)
BASOPHILS # BLD AUTO: 0.1 THOUSANDS/ΜL (ref 0–0.1)
BASOPHILS NFR BLD AUTO: 1 % (ref 0–1)
BILIRUB SERPL-MCNC: 0.3 MG/DL (ref 0.2–1)
BUN SERPL-MCNC: 35 MG/DL (ref 5–25)
CALCIUM SERPL-MCNC: 8.5 MG/DL (ref 8.3–10.1)
CHLORIDE SERPL-SCNC: 104 MMOL/L (ref 100–108)
CO2 SERPL-SCNC: 26 MMOL/L (ref 21–32)
CREAT SERPL-MCNC: 2.3 MG/DL (ref 0.6–1.3)
EOSINOPHIL # BLD AUTO: 0.21 THOUSAND/ΜL (ref 0–0.61)
EOSINOPHIL NFR BLD AUTO: 3 % (ref 0–6)
ERYTHROCYTE [DISTWIDTH] IN BLOOD BY AUTOMATED COUNT: 14.8 % (ref 11.6–15.1)
GFR SERPL CREATININE-BSD FRML MDRD: 26 ML/MIN/1.73SQ M
GLUCOSE SERPL-MCNC: 163 MG/DL (ref 65–140)
GLUCOSE SERPL-MCNC: 199 MG/DL (ref 65–140)
GLUCOSE SERPL-MCNC: 298 MG/DL (ref 65–140)
GLUCOSE SERPL-MCNC: 353 MG/DL (ref 65–140)
GLUCOSE SERPL-MCNC: 386 MG/DL (ref 65–140)
HCT VFR BLD AUTO: 27 % (ref 36.5–49.3)
HGB BLD-MCNC: 8.7 G/DL (ref 12–17)
IMM GRANULOCYTES # BLD AUTO: 0.03 THOUSAND/UL (ref 0–0.2)
IMM GRANULOCYTES NFR BLD AUTO: 0 % (ref 0–2)
LYMPHOCYTES # BLD AUTO: 1.41 THOUSANDS/ΜL (ref 0.6–4.47)
LYMPHOCYTES NFR BLD AUTO: 18 % (ref 14–44)
MAGNESIUM SERPL-MCNC: 1.8 MG/DL (ref 1.6–2.6)
MCH RBC QN AUTO: 30.5 PG (ref 26.8–34.3)
MCHC RBC AUTO-ENTMCNC: 32.2 G/DL (ref 31.4–37.4)
MCV RBC AUTO: 95 FL (ref 82–98)
MONOCYTES # BLD AUTO: 0.69 THOUSAND/ΜL (ref 0.17–1.22)
MONOCYTES NFR BLD AUTO: 9 % (ref 4–12)
NEUTROPHILS # BLD AUTO: 5.52 THOUSANDS/ΜL (ref 1.85–7.62)
NEUTS SEG NFR BLD AUTO: 69 % (ref 43–75)
NRBC BLD AUTO-RTO: 0 /100 WBCS
PHOSPHATE SERPL-MCNC: 3.4 MG/DL (ref 2.3–4.1)
PLATELET # BLD AUTO: 173 THOUSANDS/UL (ref 149–390)
PMV BLD AUTO: 10.7 FL (ref 8.9–12.7)
POTASSIUM SERPL-SCNC: 4.6 MMOL/L (ref 3.5–5.3)
PROCALCITONIN SERPL-MCNC: 0.16 NG/ML
PROT SERPL-MCNC: 5.9 G/DL (ref 6.4–8.2)
RBC # BLD AUTO: 2.85 MILLION/UL (ref 3.88–5.62)
SODIUM SERPL-SCNC: 138 MMOL/L (ref 136–145)
WBC # BLD AUTO: 7.96 THOUSAND/UL (ref 4.31–10.16)

## 2020-03-17 PROCEDURE — 97530 THERAPEUTIC ACTIVITIES: CPT

## 2020-03-17 PROCEDURE — 97116 GAIT TRAINING THERAPY: CPT

## 2020-03-17 PROCEDURE — 82948 REAGENT STRIP/BLOOD GLUCOSE: CPT

## 2020-03-17 PROCEDURE — 99233 SBSQ HOSP IP/OBS HIGH 50: CPT | Performed by: INTERNAL MEDICINE

## 2020-03-17 PROCEDURE — 97535 SELF CARE MNGMENT TRAINING: CPT

## 2020-03-17 PROCEDURE — 83735 ASSAY OF MAGNESIUM: CPT | Performed by: INTERNAL MEDICINE

## 2020-03-17 PROCEDURE — 84100 ASSAY OF PHOSPHORUS: CPT | Performed by: INTERNAL MEDICINE

## 2020-03-17 PROCEDURE — 85025 COMPLETE CBC W/AUTO DIFF WBC: CPT | Performed by: INTERNAL MEDICINE

## 2020-03-17 PROCEDURE — 80053 COMPREHEN METABOLIC PANEL: CPT | Performed by: INTERNAL MEDICINE

## 2020-03-17 PROCEDURE — 84145 PROCALCITONIN (PCT): CPT | Performed by: INTERNAL MEDICINE

## 2020-03-17 RX ORDER — TAMSULOSIN HYDROCHLORIDE 0.4 MG/1
0.4 CAPSULE ORAL
Status: DISCONTINUED | OUTPATIENT
Start: 2020-03-17 | End: 2020-03-19 | Stop reason: HOSPADM

## 2020-03-17 RX ORDER — PANTOPRAZOLE SODIUM 40 MG/1
40 TABLET, DELAYED RELEASE ORAL
Status: DISCONTINUED | OUTPATIENT
Start: 2020-03-17 | End: 2020-03-19 | Stop reason: HOSPADM

## 2020-03-17 RX ORDER — AMLODIPINE BESYLATE 10 MG/1
10 TABLET ORAL DAILY
Status: DISCONTINUED | OUTPATIENT
Start: 2020-03-17 | End: 2020-03-19 | Stop reason: HOSPADM

## 2020-03-17 RX ADMIN — PIPERACILLIN AND TAZOBACTAM 2.25 G: 2; .25 INJECTION, POWDER, FOR SOLUTION INTRAVENOUS at 08:57

## 2020-03-17 RX ADMIN — ASPIRIN 81 MG 81 MG: 81 TABLET ORAL at 08:58

## 2020-03-17 RX ADMIN — ERTAPENEM SODIUM 500 MG: 1 INJECTION, POWDER, LYOPHILIZED, FOR SOLUTION INTRAMUSCULAR; INTRAVENOUS at 12:23

## 2020-03-17 RX ADMIN — VANCOMYCIN HYDROCHLORIDE 125 MG: 500 INJECTION, POWDER, LYOPHILIZED, FOR SOLUTION INTRAVENOUS at 12:23

## 2020-03-17 RX ADMIN — HEPARIN SODIUM 5000 UNITS: 5000 INJECTION INTRAVENOUS; SUBCUTANEOUS at 15:20

## 2020-03-17 RX ADMIN — VITAMIN D, TAB 1000IU (100/BT) 1000 UNITS: 25 TAB at 08:58

## 2020-03-17 RX ADMIN — VANCOMYCIN HYDROCHLORIDE 125 MG: 500 INJECTION, POWDER, LYOPHILIZED, FOR SOLUTION INTRAVENOUS at 05:09

## 2020-03-17 RX ADMIN — INSULIN LISPRO 1 UNITS: 100 INJECTION, SOLUTION INTRAVENOUS; SUBCUTANEOUS at 08:58

## 2020-03-17 RX ADMIN — VANCOMYCIN HYDROCHLORIDE 125 MG: 500 INJECTION, POWDER, LYOPHILIZED, FOR SOLUTION INTRAVENOUS at 17:11

## 2020-03-17 RX ADMIN — HEPARIN SODIUM 5000 UNITS: 5000 INJECTION INTRAVENOUS; SUBCUTANEOUS at 05:09

## 2020-03-17 RX ADMIN — PANTOPRAZOLE SODIUM 40 MG: 40 TABLET, DELAYED RELEASE ORAL at 12:23

## 2020-03-17 RX ADMIN — INSULIN LISPRO 4 UNITS: 100 INJECTION, SOLUTION INTRAVENOUS; SUBCUTANEOUS at 12:23

## 2020-03-17 RX ADMIN — INSULIN LISPRO 3 UNITS: 100 INJECTION, SOLUTION INTRAVENOUS; SUBCUTANEOUS at 17:10

## 2020-03-17 RX ADMIN — INSULIN LISPRO 3 UNITS: 100 INJECTION, SOLUTION INTRAVENOUS; SUBCUTANEOUS at 21:37

## 2020-03-17 RX ADMIN — Medication 1 CAPSULE: at 17:11

## 2020-03-17 RX ADMIN — TAMSULOSIN HYDROCHLORIDE 0.4 MG: 0.4 CAPSULE ORAL at 17:11

## 2020-03-17 RX ADMIN — AMLODIPINE BESYLATE 10 MG: 10 TABLET ORAL at 12:22

## 2020-03-17 RX ADMIN — HEPARIN SODIUM 5000 UNITS: 5000 INJECTION INTRAVENOUS; SUBCUTANEOUS at 21:37

## 2020-03-17 NOTE — ASSESSMENT & PLAN NOTE
Episode in the [facility] while receiving lanreotide infusion  May have been on basis of acute illness/sepsis  No further episodes since admission

## 2020-03-17 NOTE — ASSESSMENT & PLAN NOTE
· Associated with indwelling Haile catheter  · Continue IV zosyn and IV vancomycin for a 7-day antibiotic course based on the urine culture results    Urine culture [[id number]] (Abnormal)  Collected: 03/11/20 1143   Lab Status: Preliminary result Specimen: Urine, Indwelling Haile Catheter Updated: 03/13/20 1135    Urine Culture >100,000 cfu/ml Escherichia coli ESBLAbnormal     Comment: An Extended-Spectrum Beta-Lactamase is being produced by this organism (requires contact precautions)  Cephalosporins are NOT effective for treating these organisms  For SEVERE infections (i e  bacteremia, septic shock, etc ), Carbapenems are the drug of choice  For MILD infections (i e  isolated urinary or biliary infection), high-dose Zosyn may be used          10,000-19,000 cfu/ml Enterococcus faecalisAbnormal    Susceptibility     Escherichia coli ESBL (1)     Antibiotic Interpretation Microscan Method Status    Amikacin ($$) Susceptible <=16 ug/ml LUIS Preliminary    Amoxicillin + Clavulanate Susceptible 8/4 ug/ml LUIS Preliminary    Ampicillin ($$) Resistant >16 00 ug/ml LUIS Preliminary    Ampicillin + Sulbactam ($) Resistant >16/8 ug/ml LUIS Preliminary    Aztreonam ($$$)  Resistant >16 ug/ml LUIS Preliminary    Cefazolin ($) Resistant >16 00 ug/ml LUIS Preliminary    Cefepime ($) Resistant >16 00 ug/ml LUIS Preliminary    Cefotaxime ($) Resistant >32 00 ug/ml LUIS Preliminary    Ceftazidime ($$) Resistant >16 ug/ml LUIS Preliminary    Ceftriaxone ($$) Resistant >32 00 ug/ml LUIS Preliminary    Cefuroxime ($$) Resistant >16 ug/ml LUIS Preliminary    Ciprofloxacin ($)  Resistant >2 00 ug/ml LUIS Preliminary    Ertapenem ($$$) Susceptible <=0 5 ug/ml LUIS Preliminary    Gentamicin ($$) Resistant >8 ug/ml LUIS Preliminary    Levofloxacin ($) Resistant >4 00 ug/ml LUIS Preliminary    Nitrofurantoin Resistant >64 ug/ml LUIS Preliminary    Piperacillin + Tazobactam ($$$) Susceptible <=4 ug/ml LUIS Preliminary    Tetracycline Susceptible <=4 ug/ml LUIS Preliminary    Tobramycin ($) Intermediate 8 ug/ml LUIS Preliminary    Trimethoprim + Sulfamethoxazole ($$$) Susceptible <=2/38 ug/ml LUIS Preliminary    Enterococcus faecalis (2)     Antibiotic Interpretation Microscan Method Status    Ampicillin ($$) Susceptible <=2 00 ug/ml LUIS Preliminary    Levofloxacin ($) Resistant >4 00 ug/ml LUIS Preliminary    Nitrofurantoin Susceptible <=32 ug/ml LUIS Preliminary    Tetracycline Resistant >8 ug/ml LUIS Preliminary    Vancomycin ($) Susceptible 1 00 ug/ml LUIS Preliminary

## 2020-03-17 NOTE — ASSESSMENT & PLAN NOTE
Patient is now quite hypertensive  Will discontinue IV fluids, and reinitiate home regimen of calcium channel blocker therapy

## 2020-03-17 NOTE — ASSESSMENT & PLAN NOTE
· Likely secondary to volume depletion  · Resolved  · Follow the total protein level    Results for Janay Madison (MRN [id number]) as of 3/16/2020 12:44   Ref  Range 3/12/2020 05:49   ALBUMIN ELP Latest Ref Range: 52 0 - 65 0 % 49 3 (L)   ALBUMIN CONC ELP Latest Ref Range: 3 50 - 5 00 g/dl 2 86 (L)   ALPHA 1 Latest Ref Range: 2 5 - 5 0 % 8 5 (H)   ALPHA 2 Latest Ref Range: 7 0 - 13 0 % 17 9 (H)   ALPHA 2 CONC ELP Latest Ref Range: 0 40 - 1 20 g/dL 1 04   BETA-1 Latest Ref Range: 5 0 - 13 0 % 6 3   BETA 1 CONC ELP Latest Ref Range: 0 40 - 0 80 g/dL 0 37 (L)   BETA-2 Latest Ref Range: 2 0 - 8 0 % 7 0   BETA 2 CONC ELP Latest Ref Range: 0 20 - 0 50 g/dL 0 41   GAMMA GLOBULIN Latest Ref Range: 12 0 - 22 0 % 11 0 (L)   GAMMA CONC ELP Latest Ref Range: 0 50 - 1 60 g/dL 0 64   SPEP INTERPRETATION Unknown No monoclonal bands noted  Reviewed by: Reine Nyhan, MD  ([id number])  **Electronic Signature**     The UPEP shows non-selective proteinuria  No monoclonal bands noted   Reviewed by: Reine Nyhan, MD ([id number])

## 2020-03-17 NOTE — PLAN OF CARE
Problem: PHYSICAL THERAPY ADULT  Goal: Performs mobility at highest level of function for planned discharge setting  See evaluation for individualized goals  Description  Treatment/Interventions: Functional transfer training, LE strengthening/ROM, Therapeutic exercise, Endurance training, Bed mobility, Gait training          See flowsheet documentation for full assessment, interventions and recommendations  Outcome: Progressing  Note:   Prognosis: Fair  Problem List: Decreased strength, Decreased endurance, Impaired balance, Decreased mobility  Assessment: Pt  seen for PT treatment session this date with interventions consisting of gait training w/ emphasis on improving pt's ability to ambulate  Pt  Currently performing  tx and ambulation at ( min) x 1 level of function  Utilizing RW with decreased  balance and stability  Transfer training to increase functional task performance and  to promote safe sit/stand and stand/sit mobility  Pt  education  for hand postion and safety and technique with transfer training  Increased time to complete all tasks  Limited by weakness and fatigue  In comparison to previous session, Pt  With improvements in activity tolerance  Pt is in need of continued activity in PT to improve strength balance endurance mobility transfers and ambulation with return to maximize LOF  From PT/mobility standpoint, recommendation at time of d/c would be STR  in order to promote return to PLOF and independence  Recommendation: Short-term skilled PT          See flowsheet documentation for full assessment

## 2020-03-17 NOTE — ASSESSMENT & PLAN NOTE
ESBL E Coli by cultures  Discontinue Vancomycin and Pip-Torrey, and initiate on renally dosed Ertapenem, although Procalcitonin had been improving  Wilfrido Coats growth <20,000  Changed Haile Catheter  - Procalcitonin has decreased nicely on prior treatment, and remainder of infectious work-up negative

## 2020-03-17 NOTE — ASSESSMENT & PLAN NOTE
Lab Results   Component Value Date    HGBA1C 6 6 (H) 03/12/2020       Recent Labs     03/16/20  1646 03/16/20  2120 03/17/20  0748 03/17/20  1130   POCGLU 299* 394* 163* 386*       Blood Sugar Average: Last 72 hrs:  (P) 221     - Episode of hypoglycemia resolved - Current sugars in the 100-300's range  Continue to hold Detemir, and continue sliding scale coverage  Continue ADA diet

## 2020-03-17 NOTE — CONSULTS
The patient's Vancomycin therapy has been completed / discontinued  Thank you for this consult;  Pharmacy will sign-off now

## 2020-03-17 NOTE — ASSESSMENT & PLAN NOTE
Sepsis was present on admission and secondary to Complicated UTI associated with an indwelling Haile catheter  SIRS criteria was met with hypothermia and a leukocytosis  - Was maintained on IV Vancomycin and Pip-Torrey X5 days, changed to Ertapenem today  - D/C IVFs today and monitor renal function and volume status - Weight appears to have climbed by 5Kg since admission   - Lactic acid level within normal limits  - Procalcitonin improving and now normalized

## 2020-03-17 NOTE — ASSESSMENT & PLAN NOTE
Malnutrition Findings:   Malnutrition type: Chronic illness  Degree of Malnutrition: Other severe protein calorie malnutrition    BMI Findings: Body mass index is 23 87 kg/m²       · Continue nutritional supplements per the dietitian's recommendations

## 2020-03-17 NOTE — ASSESSMENT & PLAN NOTE
The patient was evaluated by physical therapy and occupational therapy - Will require short-term rehabilitation placement upon discharge

## 2020-03-17 NOTE — OCCUPATIONAL THERAPY NOTE
OT Note     03/17/20 1135   Restrictions/Precautions   Other Precautions Contact/isolation; Chair Alarm; Fall Risk;Multiple lines;Telemetry   ADL   Where Assessed Chair   Grooming Assistance 5  Supervision/Setup   UB Bathing Assistance 5  Supervision/Setup   UB Bathing Comments A with back   LB Bathing Assistance 3  Moderate Assistance   LB Bathing Deficit Buttocks;Right lower leg including foot; Left lower leg including foot   LB Bathing Comments States caregiver provides A with LEs at home   UB Dressing Assistance 4  Minimal Assistance   UB Dressing Comments A to manage tele and IV lines   LB Dressing Assistance 2  Maximal Assistance   LB Dressing Deficit Don/doff R sock; Don/doff L sock   LB Dressing Comments Reports caregiver provides A with same   Transfers   Sit to Stand 4  Minimal assistance   Additional items Armrests; Increased time required   Stand to Sit 4  Minimal assistance   Additional items Armrests   Activity Tolerance   Activity Tolerance Patient limited by fatigue   Assessment   Assessment Pt with deficits affecting overall functional performance as per initial eval  Moves slowly thru self care, initially requires cues to remain awake  Spoke with OTR who is in agreement pt will benefit from continued active OT services in attempt to facilitate increased activity tolerance and endurance on allow increased participation thru self care and func txfrs  Plan   Goal Expiration Date 03/26/20   OT Treatment Day 2   Recommendation   OT Discharge Recommendation Short Term Rehab   Pt   Requesting return to bed, encouraged remain OOB in chair for noon meal

## 2020-03-17 NOTE — PROGRESS NOTES
Progress Note - Alda Ferrell 1940, 79 y o  male MRN: [id number]    Unit/Bed#: 415-01 Encounter: [id number]    Primary Care Provider: Sydna Bosworth, DO   Date and time admitted to hospital: 3/11/2020  9:12 AM        Acute cystitis with hematuria  Assessment & Plan  ESBL E Coli by cultures  Discontinue Vancomycin and Pip-Torrey, and initiate on renally dosed Ertapenem, although Procalcitonin had been improving  Scheryl Cane growth <20,000  Changed Haile Catheter  - Procalcitonin has decreased nicely on prior treatment, and remainder of infectious work-up negative  Chronic indwelling Hiale catheter  Assessment & Plan  Urinary tract infection associated with indwelling Haile catheter - culture results + ESBL E  Coli  Haile changed  Generalized weakness  Assessment & Plan  The patient was evaluated by physical therapy and occupational therapy - Will require short-term rehabilitation placement upon discharge  * Sepsis ([facility] [address] )  Assessment & Plan  Sepsis was present on admission and secondary to Complicated UTI associated with an indwelling Haile catheter  SIRS criteria was met with hypothermia and a leukocytosis  - Was maintained on IV Vancomycin and Pip-Torrey X5 days, changed to Ertapenem today  - D/C IVFs today and monitor renal function and volume status - Weight appears to have climbed by 5Kg since admission   - Lactic acid level within normal limits  - Procalcitonin improving and now normalized  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  Assessment & Plan  Continue vancomycin 125 mg PO every 6 hours while on IV antibiotic treatment      Diastolic dysfunction  Assessment & Plan  Monitor the patient's volume status closely    Bradycardia  Assessment & Plan  Transient bradycardia on 03/15/2020 with no recurrence by telemetry  Discontinue Tele today  Low TSH level  Assessment & Plan  Mildly low TSH with normal free T4 in setting of acute illness    Recheck TSH level as outpatient    Neuroendocrine tumor  Assessment & Plan  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 - looks unchanged overall  Receiving lanreotide infusions as an outpatient with Hematology/Oncology    Severe protein-calorie malnutrition ([facility] [address] )  Assessment & Plan  Malnutrition Findings:   Malnutrition type: Chronic illness  Degree of Malnutrition: Other severe protein calorie malnutrition    BMI Findings: Body mass index is 23 87 kg/m²  · Continue nutritional supplements per the dietitian's recommendations    Syncope and collapse  Assessment & Plan  Episode in the [facility] while receiving lanreotide infusion  May have been on basis of acute illness/sepsis  No further episodes since admission  Stage 4 chronic kidney disease (HCC)  Assessment & Plan  Baseline serum creatinine of 1 9-2 4 mg/dl  Avoid nephrotoxic agents  Type 2 diabetes mellitus with hypoglycemia without coma, with long-term current use of insulin [facility])  Assessment & Plan  Lab Results   Component Value Date    HGBA1C 6 6 (H) 03/12/2020       Recent Labs     03/16/20  1646 03/16/20  2120 03/17/20  0748 03/17/20  1130   POCGLU 299* 394* 163* 386*       Blood Sugar Average: Last 72 hrs:  (P) 221     - Episode of hypoglycemia resolved - Current sugars in the 100-300's range  Continue to hold Detemir, and continue sliding scale coverage  Continue ADA diet  Hypertension  Assessment & Plan  Patient is now quite hypertensive  Will discontinue IV fluids, and reinitiate home regimen of calcium channel blocker therapy  VTE Pharmacologic Prophylaxis:   Pharmacologic: Heparin  Mechanical VTE Prophylaxis in Place: Yes    Patient Centered Rounds: I have performed bedside rounds with nursing staff today  Discussions with Specialists or Other Care Team Provider: None    Education and Discussions with Family / Patient: Yes    Time Spent for Care: 45 minutes    More than 50% of total time spent on counseling and coordination of care as described above  Current Length of Stay: 6 day(s)    Current Patient Status: Inpatient   Certification Statement: The patient will continue to require additional inpatient hospital stay due to Conclusion of IV Antibiotics    Discharge Plan: TBD    Code Status: Level 1 - Full Code      Subjective:   Patient seen examined this morning  Continues to complain of fatigue and weakness, not endorsing vast improvement in symptoms  No overnight events reported  Remains afebrile  Objective:     Vitals:   Temp (24hrs), Av 3 °F (36 8 °C), Min:98 2 °F (36 8 °C), Max:98 4 °F (36 9 °C)    Temp:  [98 2 °F (36 8 °C)-98 4 °F (36 9 °C)] 98 4 °F (36 9 °C)  HR:  [68-75] 71  Resp:  [16-18] 16  BP: (161-169)/(70-86) 167/72  SpO2:  [98 %-100 %] 99 %  Body mass index is 23 87 kg/m²  Input and Output Summary (last 24 hours): Intake/Output Summary (Last 24 hours) at 3/17/2020 1232  Last data filed at 3/16/2020 1756  Gross per 24 hour   Intake 1050 ml   Output 500 ml   Net 550 ml       Physical Exam:     Physical Exam   Constitutional: He is oriented to person, place, and time  He appears well-developed and well-nourished  Eyes: EOM are normal    Neck: Neck supple  Cardiovascular: Normal rate and regular rhythm  Pulmonary/Chest: Effort normal and breath sounds normal  No stridor  He has no wheezes  He has no rales  Abdominal: Soft  Bowel sounds are normal  He exhibits no distension  There is no tenderness  Musculoskeletal: He exhibits no edema  Neurological: He is alert and oriented to person, place, and time  Psychiatric: He has a normal mood and affect   His behavior is normal      Additional Data:     Labs:    Results from last 7 days   Lab Units 20  0412   WBC Thousand/uL 7 96   HEMOGLOBIN g/dL 8 7*   HEMATOCRIT % 27 0*   PLATELETS Thousands/uL 173   NEUTROS PCT % 69   LYMPHS PCT % 18   MONOS PCT % 9   EOS PCT % 3     Results from last 7 days   Lab Units 20  0412   SODIUM mmol/L 138 POTASSIUM mmol/L 4 6   CHLORIDE mmol/L 104   CO2 mmol/L 26   BUN mg/dL 35*   CREATININE mg/dL 2 30*   ANION GAP mmol/L 8   CALCIUM mg/dL 8 5   ALBUMIN g/dL 2 1*   TOTAL BILIRUBIN mg/dL 0 30   ALK PHOS U/L 152*   ALT U/L 23   AST U/L 14   GLUCOSE RANDOM mg/dL 199*         Results from last 7 days   Lab Units 03/17/20  1130 03/17/20  0748 03/16/20  2120 03/16/20  1646 03/16/20  1124 03/16/20  0736 03/15/20  2009 03/15/20  1615 03/15/20  1126 03/15/20  0810 03/15/20  0718 03/14/20  2043   POC GLUCOSE mg/dl 386* 163* 394* 299* 306* 163* 235* 206* 289* 114 44* 250*     Results from last 7 days   Lab Units 03/12/20  0549   HEMOGLOBIN A1C % 6 6*     Results from last 7 days   Lab Units 03/17/20  0412 03/15/20  0455 03/14/20  0504 03/13/20  0526 03/12/20  0549 03/11/20  1139   LACTIC ACID mmol/L  --   --   --   --  1 2 1 3   PROCALCITONIN ng/ml 0 16 0 23 0 47* 0 70* 1 23*  --            * I Have Reviewed All Lab Data Listed Above  * Additional Pertinent Lab Tests Reviewed: All [facility] Admission Reviewed    Imaging:    Imaging Reports Reviewed Today Include: CT Chest, abdomen, pelvis, CT Head, CXR    Recent Cultures (last 7 days):     Results from last 7 days   Lab Units 03/11/20  1910 03/11/20  1143 03/11/20  1140 03/11/20  1139   BLOOD CULTURE   --   --  No Growth After 5 Days  No Growth After 5 Days     URINE CULTURE   --  >100,000 cfu/ml Escherichia coli ESBL*  10,000-19,000 cfu/ml Enterococcus faecalis*  50,000-59,000 cfu/ml Candida parapsilosis*  --   --    C DIFF TOXIN B  Negative  --   --   --        Last 24 Hours Medication List:     Current Facility-Administered Medications:  acetaminophen 650 mg Oral Q6H PRN Guanakito Manley DO    amLODIPine 10 mg Oral Daily Mela Jiménez MD    aspirin 81 mg Oral Daily Guanakito Manley DO    b complex-vitamin C-folic acid 1 capsule Oral Daily With Siddhartha Jimenez DO    cholecalciferol 1,000 Units Oral Daily Guanakito Manley DO    dextrose 25 mL Intravenous PRN Bina Gupta, DO    ertapenem 500 mg Intravenous Q24H Evan Mccrary MD Last Rate: 500 mg (03/17/20 1223)   heparin (porcine) 5,000 Units Subcutaneous [facility] Joe Hernandez, DO    insulin lispro 1-5 Units Subcutaneous TID AC Joe Hernandez, DO    insulin lispro 1-5 Units Subcutaneous HS Bina Gupta, DO    pantoprazole 40 mg Oral Early Morning Evan Mccrary MD    tamsulosin 0 4 mg Oral Daily With Naseem Aguilar MD    vancomycin 125 mg Oral Q6H Rodolfo Gay MD         Today, Patient Was Seen By: Evan Mccrary MD    ** Please Note: Dictation voice to text software may have been used in the creation of this document   **

## 2020-03-17 NOTE — PLAN OF CARE
Problem: OCCUPATIONAL THERAPY ADULT  Goal: Performs self-care activities at highest level of function for planned discharge setting  See evaluation for individualized goals  Description  Treatment Interventions: ADL retraining, UE strengthening/ROM, Endurance training, Patient/family training, Activityengagement          See flowsheet documentation for full assessment, interventions and recommendations  Outcome: Progressing  Note:   Limitation: Decreased ADL status, Decreased UE strength, Decreased Safe judgement during ADL, Decreased endurance, Decreased self-care trans, Decreased high-level ADLs     Assessment: Pt with deficits affecting overall functional performance as per initial eval  Moves slowly thru self care, initially requires cues to remain awake  Spoke with OTR who is in agreement pt will benefit from continued active OT services in attempt to facilitate increased activity tolerance and endurance on allow increased participation thru self care and func txfrs         OT Discharge Recommendation: Short Term Rehab

## 2020-03-17 NOTE — ASSESSMENT & PLAN NOTE
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

## 2020-03-17 NOTE — PHYSICAL THERAPY NOTE
PHYSICAL THERAPY NOTE          Patient Name: Mela Brumfield  DFGPO'T Date: 3/17/2020     03/17/20 1024   Restrictions/Precautions   Other Precautions   (Contact/isolation; Chair Alarm; Fall Risk;Multiple lines;Telem)   Subjective   Subjective c/o weakness  Agreeable to therapy   Bed Mobility   Supine to Sit 4  Minimal assistance   Additional items Assist x 1;HOB elevated; Bedrails; Increased time required;Verbal cues   Transfers   Sit to Stand 4  Minimal assistance   Additional items Assist x 1;Bedrails; Increased time required;Verbal cues   Stand to Sit 4  Minimal assistance   Additional items Assist x 1; Armrests; Increased time required;Verbal cues   Ambulation/Elevation   Gait pattern   (Excessively slow; Short stride; Foward flexed;Decreased foot c)   Gait Assistance 4  Minimal assist   Additional items Assist x 1;Verbal cues   Assistive Device Rolling walker   Distance 25' x 2 with seated rest   Balance   Ambulatory Fair +   Endurance Deficit   Endurance Deficit Yes   Activity Tolerance   Activity Tolerance Patient limited by fatigue   Assessment   Prognosis Fair   Problem List Decreased strength;Decreased endurance; Impaired balance;Decreased mobility   Assessment Pt  seen for PT treatment session this date with interventions consisting of gait training w/ emphasis on improving pt's ability to ambulate  Pt  Currently performing  tx and ambulation at ( min) x 1 level of function  Utilizing RW with decreased  balance and stability  Transfer training to increase functional task performance and  to promote safe sit/stand and stand/sit mobility  Pt  education  for hand postion and safety and technique with transfer training  Increased time to complete all tasks  Limited by weakness and fatigue  In comparison to previous session, Pt  With improvements in activity tolerance     Pt is in need of continued activity in PT to improve strength balance endurance mobility transfers and ambulation with return to maximize LOF  From PT/mobility standpoint, recommendation at time of d/c would be STR  in order to promote return to PLOF and independence  Goals   LTG Expiration Date 03/26/20   PT Treatment Day 3   Plan   Treatment/Interventions Functional transfer training;LE strengthening/ROM; Therapeutic exercise; Endurance training;Bed mobility;Gait training   Progress Slow progress, decreased activity tolerance   Recommendation   Recommendation Short-term skilled PT   Pt  OOB in chair  with call bell within reach, scd's connected and turned on and alarm on at end of PT session  Discussed with Virginia Beavers, PT today's treatment and patient's current level of function for care coordination

## 2020-03-17 NOTE — PROGRESS NOTES
Vancomycin IV Pharmacy-to-Dose Consultation    Manav Fishman is a 79 y o  male who is currently receiving Vancomycin IV with management by the Pharmacy Consult service  Assessment/Plan:  The patient was reviewed  Renal function is stable and no signs or symptoms of nephrotoxicity and/or infusion reactions were documented in the chart  Based on todays assessment, continue current vancomycin (day # 7) dosing of 1000mg q24h, with a plan for trough to be drawn at 1430 on 3/18/20  We will continue to follow the patients culture results and clinical progress daily      Mouna Valerio, Pharmacist

## 2020-03-17 NOTE — ASSESSMENT & PLAN NOTE
Urinary tract infection associated with indwelling Haile catheter - culture results + ESBL E  Coli  Haile changed

## 2020-03-18 LAB
ANION GAP SERPL CALCULATED.3IONS-SCNC: 8 MMOL/L (ref 4–13)
BASOPHILS # BLD AUTO: 0.08 THOUSANDS/ΜL (ref 0–0.1)
BASOPHILS NFR BLD AUTO: 1 % (ref 0–1)
BUN SERPL-MCNC: 35 MG/DL (ref 5–25)
CALCIUM SERPL-MCNC: 8.6 MG/DL (ref 8.3–10.1)
CHLORIDE SERPL-SCNC: 104 MMOL/L (ref 100–108)
CO2 SERPL-SCNC: 26 MMOL/L (ref 21–32)
CREAT SERPL-MCNC: 2.23 MG/DL (ref 0.6–1.3)
EOSINOPHIL # BLD AUTO: 0.17 THOUSAND/ΜL (ref 0–0.61)
EOSINOPHIL NFR BLD AUTO: 2 % (ref 0–6)
ERYTHROCYTE [DISTWIDTH] IN BLOOD BY AUTOMATED COUNT: 14.6 % (ref 11.6–15.1)
GFR SERPL CREATININE-BSD FRML MDRD: 27 ML/MIN/1.73SQ M
GLUCOSE SERPL-MCNC: 151 MG/DL (ref 65–140)
GLUCOSE SERPL-MCNC: 207 MG/DL (ref 65–140)
GLUCOSE SERPL-MCNC: 245 MG/DL (ref 65–140)
GLUCOSE SERPL-MCNC: 313 MG/DL (ref 65–140)
GLUCOSE SERPL-MCNC: 393 MG/DL (ref 65–140)
HCT VFR BLD AUTO: 26.5 % (ref 36.5–49.3)
HGB BLD-MCNC: 8.5 G/DL (ref 12–17)
IMM GRANULOCYTES # BLD AUTO: 0.03 THOUSAND/UL (ref 0–0.2)
IMM GRANULOCYTES NFR BLD AUTO: 0 % (ref 0–2)
LYMPHOCYTES # BLD AUTO: 1.42 THOUSANDS/ΜL (ref 0.6–4.47)
LYMPHOCYTES NFR BLD AUTO: 16 % (ref 14–44)
MAGNESIUM SERPL-MCNC: 1.6 MG/DL (ref 1.6–2.6)
MCH RBC QN AUTO: 30.1 PG (ref 26.8–34.3)
MCHC RBC AUTO-ENTMCNC: 32.1 G/DL (ref 31.4–37.4)
MCV RBC AUTO: 94 FL (ref 82–98)
MONOCYTES # BLD AUTO: 0.66 THOUSAND/ΜL (ref 0.17–1.22)
MONOCYTES NFR BLD AUTO: 8 % (ref 4–12)
NEUTROPHILS # BLD AUTO: 6.48 THOUSANDS/ΜL (ref 1.85–7.62)
NEUTS SEG NFR BLD AUTO: 73 % (ref 43–75)
NRBC BLD AUTO-RTO: 0 /100 WBCS
PLATELET # BLD AUTO: 167 THOUSANDS/UL (ref 149–390)
PMV BLD AUTO: 10.6 FL (ref 8.9–12.7)
POTASSIUM SERPL-SCNC: 4.6 MMOL/L (ref 3.5–5.3)
RBC # BLD AUTO: 2.82 MILLION/UL (ref 3.88–5.62)
SODIUM SERPL-SCNC: 138 MMOL/L (ref 136–145)
VANCOMYCIN TROUGH SERPL-MCNC: 16.3 UG/ML (ref 10–20)
WBC # BLD AUTO: 8.84 THOUSAND/UL (ref 4.31–10.16)

## 2020-03-18 PROCEDURE — 97110 THERAPEUTIC EXERCISES: CPT

## 2020-03-18 PROCEDURE — 97116 GAIT TRAINING THERAPY: CPT

## 2020-03-18 PROCEDURE — 84145 PROCALCITONIN (PCT): CPT | Performed by: INTERNAL MEDICINE

## 2020-03-18 PROCEDURE — 82948 REAGENT STRIP/BLOOD GLUCOSE: CPT

## 2020-03-18 PROCEDURE — 80202 ASSAY OF VANCOMYCIN: CPT | Performed by: INTERNAL MEDICINE

## 2020-03-18 PROCEDURE — 83735 ASSAY OF MAGNESIUM: CPT | Performed by: INTERNAL MEDICINE

## 2020-03-18 PROCEDURE — 80048 BASIC METABOLIC PNL TOTAL CA: CPT | Performed by: INTERNAL MEDICINE

## 2020-03-18 PROCEDURE — 99232 SBSQ HOSP IP/OBS MODERATE 35: CPT | Performed by: INTERNAL MEDICINE

## 2020-03-18 PROCEDURE — 85025 COMPLETE CBC W/AUTO DIFF WBC: CPT | Performed by: INTERNAL MEDICINE

## 2020-03-18 RX ORDER — MAGNESIUM SULFATE HEPTAHYDRATE 40 MG/ML
2 INJECTION, SOLUTION INTRAVENOUS ONCE
Status: COMPLETED | OUTPATIENT
Start: 2020-03-18 | End: 2020-03-18

## 2020-03-18 RX ADMIN — ERTAPENEM SODIUM 500 MG: 1 INJECTION, POWDER, LYOPHILIZED, FOR SOLUTION INTRAMUSCULAR; INTRAVENOUS at 13:32

## 2020-03-18 RX ADMIN — INSULIN LISPRO 1 UNITS: 100 INJECTION, SOLUTION INTRAVENOUS; SUBCUTANEOUS at 09:30

## 2020-03-18 RX ADMIN — INSULIN LISPRO 3 UNITS: 100 INJECTION, SOLUTION INTRAVENOUS; SUBCUTANEOUS at 17:13

## 2020-03-18 RX ADMIN — VANCOMYCIN HYDROCHLORIDE 125 MG: 500 INJECTION, POWDER, LYOPHILIZED, FOR SOLUTION INTRAVENOUS at 12:36

## 2020-03-18 RX ADMIN — AMLODIPINE BESYLATE 10 MG: 10 TABLET ORAL at 09:29

## 2020-03-18 RX ADMIN — INSULIN LISPRO 4 UNITS: 100 INJECTION, SOLUTION INTRAVENOUS; SUBCUTANEOUS at 21:18

## 2020-03-18 RX ADMIN — VANCOMYCIN HYDROCHLORIDE 125 MG: 500 INJECTION, POWDER, LYOPHILIZED, FOR SOLUTION INTRAVENOUS at 01:05

## 2020-03-18 RX ADMIN — Medication 1 CAPSULE: at 17:13

## 2020-03-18 RX ADMIN — TAMSULOSIN HYDROCHLORIDE 0.4 MG: 0.4 CAPSULE ORAL at 17:13

## 2020-03-18 RX ADMIN — VANCOMYCIN HYDROCHLORIDE 125 MG: 500 INJECTION, POWDER, LYOPHILIZED, FOR SOLUTION INTRAVENOUS at 17:13

## 2020-03-18 RX ADMIN — VANCOMYCIN HYDROCHLORIDE 125 MG: 500 INJECTION, POWDER, LYOPHILIZED, FOR SOLUTION INTRAVENOUS at 06:02

## 2020-03-18 RX ADMIN — ASPIRIN 81 MG 81 MG: 81 TABLET ORAL at 09:29

## 2020-03-18 RX ADMIN — MAGNESIUM SULFATE HEPTAHYDRATE 2 G: 40 INJECTION, SOLUTION INTRAVENOUS at 09:30

## 2020-03-18 RX ADMIN — HEPARIN SODIUM 5000 UNITS: 5000 INJECTION INTRAVENOUS; SUBCUTANEOUS at 06:02

## 2020-03-18 RX ADMIN — INSULIN LISPRO 2 UNITS: 100 INJECTION, SOLUTION INTRAVENOUS; SUBCUTANEOUS at 12:36

## 2020-03-18 RX ADMIN — HEPARIN SODIUM 5000 UNITS: 5000 INJECTION INTRAVENOUS; SUBCUTANEOUS at 13:31

## 2020-03-18 RX ADMIN — HEPARIN SODIUM 5000 UNITS: 5000 INJECTION INTRAVENOUS; SUBCUTANEOUS at 21:18

## 2020-03-18 RX ADMIN — VITAMIN D, TAB 1000IU (100/BT) 1000 UNITS: 25 TAB at 09:29

## 2020-03-18 RX ADMIN — PANTOPRAZOLE SODIUM 40 MG: 40 TABLET, DELAYED RELEASE ORAL at 06:02

## 2020-03-18 NOTE — PLAN OF CARE
Problem: Potential for Falls  Goal: Patient will remain free of falls  Description  INTERVENTIONS:  - Assess patient frequently for physical needs  -  Identify cognitive and physical deficits and behaviors that affect risk of falls  (fatigue, weakness)  -  [place] fall precautions as indicated by assessment  (high fall risk)  - Educate patient/family on patient safety including physical limitations  - Instruct patient to call for assistance with activity based on assessment  - Modify environment to reduce risk of injury  - Consider OT/PT consult to assist with strengthening/mobility    Outcome: Progressing     Problem: Prexisting or High Potential for Compromised Skin Integrity  Goal: Skin integrity is maintained or improved  Description  INTERVENTIONS:  - Identify patients at risk for skin breakdown  - Assess and monitor skin integrity  - Assess and monitor nutrition and hydration status  - Monitor labs   - Assess for incontinence   - Turn and reposition patient (every 2 hours and prn)  - Assist with mobility/ambulation (mod to max assist and walker)  - Relieve pressure over bony prominences  - Avoid friction and shearing  - Provide appropriate hygiene as needed including keeping skin clean and dry  - Evaluate need for skin moisturizer/barrier cream  - Collaborate with interdisciplinary team   - Patient/family teaching  - Consider wound care consult    Outcome: Progressing     Problem: Nutrition/Hydration-ADULT  Goal: Nutrient/Hydration intake appropriate for improving, restoring or maintaining nutritional needs  Description  Monitor and assess patient's nutrition/hydration status for malnutrition  Collaborate with interdisciplinary team and initiate plan and interventions as ordered  Monitor patient's weight and dietary intake as ordered or per policy  Utilize nutrition screening tool and intervene as necessary  Determine patient's food preferences and provide high-protein, high-caloric foods as appropriate  INTERVENTIONS:  - Monitor oral intake, urinary output, labs, and treatment plans  - Assess nutrition and hydration status and recommend course of action  - Evaluate amount of meals eaten  - Assist patient with eating if necessary   - Allow adequate time for meals  - Recommend/ encourage appropriate diets, oral nutritional supplements, and vitamin/mineral supplements  - Order, calculate, and assess calorie counts as needed  - Assess need for intravenous fluids  - Provide specific nutrition/hydration education as appropriate  - Include patient/family/caregiver in decisions related to nutrition   Outcome: Progressing     Problem: DISCHARGE PLANNING - CARE MANAGEMENT  Goal: Discharge to post-acute care or home with appropriate resources  Description  INTERVENTIONS:  - Conduct assessment to determine patient/family and health care team treatment goals, and need for post-acute services based on payer coverage, community resources, and patient preferences, and barriers to discharge  - Address psychosocial, clinical, and financial barriers to discharge as identified in assessment in conjunction with the patient/family and health care team  - Arrange appropriate level of post-acute services according to patient's   needs and preference and payer coverage in collaboration with the physician and health care team  - Communicate with and update the patient/family, physician, and health care team regarding progress on the discharge plan  - Arrange appropriate transportation to post-acute venues  -? STR vs home with homecare/home PT and OP follow up    Outcome: Progressing     Problem: PAIN - ADULT  Goal: Verbalizes/displays adequate comfort level or baseline comfort level  Description  Interventions:  - Encourage patient to monitor pain and request assistance  - Assess pain using appropriate pain scale (0-10 pain scale)  - Administer analgesics based on type and severity of pain and evaluate response  - Implement non-pharmacological measures as appropriate and evaluate response  - Consider cultural and social influences on pain and pain management  - Notify physician/advanced practitioner if interventions unsuccessful or patient reports new pain   Outcome: Progressing     Problem: INFECTION - ADULT  Goal: Absence or prevention of progression during hospitalization  Description  INTERVENTIONS:  - Assess and monitor for signs and symptoms of infection  - Monitor lab/diagnostic results  - Monitor all insertion sites, i e  indwelling lines  - Administer medications as ordered  - Instruct and encourage patient and family to use good hand hygiene technique  - Identify and instruct in appropriate isolation precautions for identified infection/condition (contact precautions)   Outcome: Progressing     Problem: SAFETY ADULT  Goal: Maintain or return to baseline ADL function  Description  INTERVENTIONS:  -  Assess patient's ability to carry out ADLs; (dependent for cares)  - Assess/evaluate cause of self-care deficits (fatigue, weakness)  - Assess range of motion  - Assess patient's mobility; (mod to max assist and walker)  - Assess patient's need for assistive devices and provide as appropriate (walker)  - Encourage maximum independence but intervene and supervise when necessary  - Involve family in performance of ADLs  - Assess for home care needs following discharge   - Consider OT consult to assist with ADL evaluation and planning for discharge  - Provide patient education as appropriate   Outcome: Progressing  Goal: Maintain or return mobility status to optimal level  Description  INTERVENTIONS:  - Assess patient's baseline mobility status (walker and assistance from spouse)    - Identify cognitive and physical deficits and behaviors that affect mobility (fatigue, weakness)  - Identify mobility aids required to assist with transfers and/or ambulation (gait belt, walker)  - [place] fall precautions as indicated by assessment (high fall risk)  - Record patient progress and toleration of activity level on Mobility SBAR; progress patient to next Phase/Stage  - Instruct patient to call for assistance with activity based on assessment  - Consider rehabilitation consult to assist with strengthening/weightbearing, etc    Outcome: Progressing     Problem: DISCHARGE PLANNING  Goal: Discharge to home or other facility with appropriate resources  Description  INTERVENTIONS:  - Identify barriers to discharge w/patient and caregiver  - Arrange for needed discharge resources and transportation as appropriate  - Identify discharge learning needs (meds, wound care, etc )  - Refer to Case Management Department for coordinating discharge planning if the patient needs post-hospital services based on physician/advanced practitioner order or complex needs related to functional status, cognitive ability, or social support system   Outcome: Progressing     Problem: Knowledge Deficit  Goal: Patient/family/caregiver demonstrates understanding of disease process, treatment plan, medications, and discharge instructions  Description  Complete learning assessment and assess knowledge base    Interventions:  - Provide teaching at level of understanding  - Provide teaching via preferred learning methods  Outcome: Progressing     Problem: GENITOURINARY - ADULT  Goal: Urinary catheter remains patent  Description  INTERVENTIONS:  - Assess patency of urinary catheter  - If patient has a chronic garcia, consider changing catheter if non-functioning (garcia is functioning)  - Follow guidelines for intermittent irrigation of non-functioning urinary catheter   Outcome: Progressing     Problem: METABOLIC, FLUID AND ELECTROLYTES - ADULT  Goal: Electrolytes maintained within normal limits  Description  INTERVENTIONS:  - Monitor labs and assess patient for signs and symptoms of electrolyte imbalances  - Administer electrolyte replacement as ordered  - Monitor response to electrolyte replacements, including repeat lab results as appropriate  - Instruct patient on fluid and nutrition as appropriate  Outcome: Progressing  Goal: Fluid balance maintained  Description  INTERVENTIONS:  - Monitor labs   - Monitor I/O and WT  - Instruct patient on fluid and nutrition as appropriate  - Assess for signs & symptoms of volume excess or deficit  Outcome: Progressing  Goal: Glucose maintained within target range  Description  INTERVENTIONS:  - Monitor Blood Glucose as ordered  - Assess for signs and symptoms of hyperglycemia and hypoglycemia  - Administer ordered medications to maintain glucose within target range  - Assess nutritional intake and initiate nutrition service referral as needed  Outcome: Progressing

## 2020-03-18 NOTE — ASSESSMENT & PLAN NOTE
Lab Results   Component Value Date    HGBA1C 6 6 (H) 03/12/2020       Recent Labs     03/17/20  1957 03/18/20  0752 03/18/20  1207 03/18/20  1553   POCGLU 353* 151* 245* 313*       Blood Sugar Average: Last 72 hrs:  (P) 473 4996     - Episode of hypoglycemia resolved - Current sugars in the 100-300's range  Continue to hold Detemir, and continue sliding scale coverage  Continue ADA diet

## 2020-03-18 NOTE — ASSESSMENT & PLAN NOTE
Episode in the [facility] while receiving lanreotide infusion  May have been on basis of acute illness/sepsis  No further episodes since admission

## 2020-03-18 NOTE — ASSESSMENT & PLAN NOTE
Sepsis was present on admission and secondary to Complicated UTI associated with an indwelling Haile catheter  SIRS criteria was met with hypothermia and a leukocytosis  - Lactic acid level within normal limits  - Procalcitonin improving and now normalized  - Was maintained on IV Vancomycin and Pip-Torrey X5 days, changed to Ertapenem yesterday  - D/C'd IVFs  Continue to monitor renal function and volume status - Weight appears to have climbed by 5Kg since admission prior to discontinuation of fluids

## 2020-03-18 NOTE — ASSESSMENT & PLAN NOTE
ESBL E Coli by cultures  Discontinued Vancomycin and Pip-Torrey, and initiated on renally dosed Ertapenem, although Procalcitonin had been improving  [place] growth <20,000  Changed Haile Catheter  - Procalcitonin has decreased nicely on prior treatment, and remainder of infectious work-up negative  Repeat Procalcitonin tomorrow, and consider discontinuation of antibiotic therapy

## 2020-03-18 NOTE — PLAN OF CARE
Problem: PHYSICAL THERAPY ADULT  Goal: Performs mobility at highest level of function for planned discharge setting  See evaluation for individualized goals  Description  Treatment/Interventions: Functional transfer training, LE strengthening/ROM, Therapeutic exercise, Endurance training, Bed mobility, Gait training          See flowsheet documentation for full assessment, interventions and recommendations  Outcome: Progressing  Note:   Prognosis: Fair  Problem List: Decreased strength, Decreased endurance, Impaired balance, Decreased mobility  Assessment: Pt  seen for PT treatment session this date with interventions consisting of therapeutic exericse and gait training w/ emphasis on improving pt's ability to ambulate  Pt  Currently performing  tx and ambulation at ( min-CGA ) x 1 level of function  Utilizing RW with decreased  balance and stability  Transfer training to increase functional task performance and  to promote safe sit/stand and stand/sit mobility  Pt  education  for hand postion and safety and technique with transfer training  Increased time to complete all tasks  Limited by weakness and fatigue  In comparison to previous session, Pt  With improvements in activity tolerance  Tolerated increased distance well  Pt is in need of continued activity in PT to improve strength balance endurance mobility transfers and ambulation with return to maximize LOF  From PT/mobility standpoint, recommendation at time of d/c would be STR  in order to promote return to PLOF and independence  Recommendation: Short-term skilled PT          See flowsheet documentation for full assessment

## 2020-03-18 NOTE — PROGRESS NOTES
Progress Note - [facility] 1940, 79 y o  male MRN: [id number]    Unit/Bed#: 415-01 Encounter: [id number]    Primary Care Provider: Kacie Reynolds DO   Date and time admitted to hospital: 3/11/2020  9:12 AM        Acute cystitis with hematuria  Assessment & Plan  ESBL E Coli by cultures  Discontinued Vancomycin and Pip-Torrey, and initiated on renally dosed Ertapenem, although Procalcitonin had been improving  [place] growth <20,000  Changed Haile Catheter  - Procalcitonin has decreased nicely on prior treatment, and remainder of infectious work-up negative  Repeat Procalcitonin tomorrow, and consider discontinuation of antibiotic therapy  Chronic indwelling Haile catheter  Assessment & Plan  Urinary tract infection associated with indwelling Haile catheter - culture results + ESBL E  Coli  Haile changed  Generalized weakness  Assessment & Plan  The patient was evaluated by physical therapy and occupational therapy - Will require short-term rehabilitation placement upon discharge  * Sepsis ([address] )  Assessment & Plan  Sepsis was present on admission and secondary to Complicated UTI associated with an indwelling Haile catheter  SIRS criteria was met with hypothermia and a leukocytosis  - Lactic acid level within normal limits  - Procalcitonin improving and now normalized  - Was maintained on IV Vancomycin and Pip-Torrey X5 days, changed to Ertapenem yesterday  - D/C'd IVFs  Continue to monitor renal function and volume status - Weight appears to have climbed by 5Kg since admission prior to discontinuation of fluids  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  Assessment & Plan  Continue vancomycin 125 mg PO every 6 hours while on IV antibiotic treatment      Diastolic dysfunction  Assessment & Plan  Monitor patient's volume status closely    Bradycardia  Assessment & Plan  Transient bradycardia on 03/15/2020 with no recurrence by telemetry  Discontinued Tele previously      Low TSH level  Assessment & Plan  Mildly low TSH with normal free T4 in setting of acute illness  Recheck TSH level as outpatient    Neuroendocrine tumor  Assessment & Plan  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 - looks unchanged overall  Receiving lanreotide infusions as an outpatient with Hematology/Oncology    Severe protein-calorie malnutrition ([address] [address] )  Assessment & Plan  Malnutrition Findings:   Malnutrition type: Chronic illness  Degree of Malnutrition: Other severe protein calorie malnutrition    BMI Findings: Body mass index is 23 4 kg/m²  · Continue nutritional supplements per the dietitian's recommendations    Syncope and collapse  Assessment & Plan  Episode in the [facility] while receiving lanreotide infusion  May have been on basis of acute illness/sepsis  No further episodes since admission  Stage 4 chronic kidney disease (HCC)  Assessment & Plan  Baseline serum creatinine of 1 9-2 4 mg/dl  Avoid nephrotoxic agents  Type 2 diabetes mellitus with hypoglycemia without coma, with long-term current use of insulin [facility])  Assessment & Plan  Lab Results   Component Value Date    HGBA1C 6 6 (H) 03/12/2020       Recent Labs     03/17/20  1957 03/18/20  0752 03/18/20  1207 03/18/20  1553   POCGLU 353* 151* 245* 313*       Blood Sugar Average: Last 72 hrs:  (P) 346 7128     - Episode of hypoglycemia resolved - Current sugars in the 100-300's range  Continue to hold Detemir, and continue sliding scale coverage  Continue ADA diet  Hypertension  Assessment & Plan  Patient is now quite hypertensive  Discontinued IV fluids, and reinitiated home regimen of CCB therapy  VTE Pharmacologic Prophylaxis:   Pharmacologic: Heparin  Mechanical VTE Prophylaxis in Place: Yes    Patient Centered Rounds: I have performed bedside rounds with nursing staff today      Discussions with Specialists or Other Care Team Provider: None    Education and Discussions with Family / Patient: Yes    Time Spent for Care: 20 minutes  More than 50% of total time spent on counseling and coordination of care as described above  Current Length of Stay: 7 day(s)    Current Patient Status: Inpatient   Certification Statement: The patient will continue to require additional inpatient hospital stay due to Continued need for IV Antibiotic    Discharge Plan: Either 3/19 or 3/20, with recommendations by PT for short-term rehab  Code Status: Level 1 - Full Code      Subjective:   Patient seen and examined at bedside today  Reports some improvement in his overall symptoms of fatigue and malaise  No overnight events reported  Remains afebrile  Objective:     Vitals:   Temp (24hrs), Av 2 °F (36 8 °C), Min:98 °F (36 7 °C), Max:98 4 °F (36 9 °C)    Temp:  [98 °F (36 7 °C)-98 4 °F (36 9 °C)] 98 °F (36 7 °C)  HR:  [69-72] 72  Resp:  [18-20] 18  BP: (127-159)/(49-75) 127/49  SpO2:  [98 %-99 %] 98 %  Body mass index is 23 4 kg/m²  Input and Output Summary (last 24 hours): Intake/Output Summary (Last 24 hours) at 3/18/2020 1717  Last data filed at 3/18/2020 1711  Gross per 24 hour   Intake 820 ml   Output 2250 ml   Net -1430 ml       Physical Exam:   Constitutional: He is oriented to person, place, and time  He appears well-developed and well-nourished  Eyes: EOM are normal    Neck: Neck supple  Cardiovascular: Normal rate and regular rhythm  Pulmonary/Chest: Effort normal and breath sounds normal  No stridor  He has no wheezes  He has no rales  Abdominal: Soft  Bowel sounds are normal  He exhibits no distension  There is no tenderness  Musculoskeletal: He exhibits no edema  : Haile Catheter in place, draining clear yellow urine  Neurological: He is alert and oriented to person, place, and time  Psychiatric: He has a normal mood and affect   His behavior is normal      Additional Data:     Labs:    Results from last 7 days   Lab Units 20  0514   WBC Thousand/uL 8 84 HEMOGLOBIN g/dL 8 5*   HEMATOCRIT % 26 5*   PLATELETS Thousands/uL 167   NEUTROS PCT % 73   LYMPHS PCT % 16   MONOS PCT % 8   EOS PCT % 2     Results from last 7 days   Lab Units 03/18/20  0514 03/17/20  0412   SODIUM mmol/L 138 138   POTASSIUM mmol/L 4 6 4 6   CHLORIDE mmol/L 104 104   CO2 mmol/L 26 26   BUN mg/dL 35* 35*   CREATININE mg/dL 2 23* 2 30*   ANION GAP mmol/L 8 8   CALCIUM mg/dL 8 6 8 5   ALBUMIN g/dL  --  2 1*   TOTAL BILIRUBIN mg/dL  --  0 30   ALK PHOS U/L  --  152*   ALT U/L  --  23   AST U/L  --  14   GLUCOSE RANDOM mg/dL 207* 199*         Results from last 7 days   Lab Units 03/18/20  1553 03/18/20  1207 03/18/20  0752 03/17/20  1957 03/17/20  1652 03/17/20  1130 03/17/20  0748 03/16/20  2120 03/16/20  1646 03/16/20  1124 03/16/20  0736 03/15/20  2009   POC GLUCOSE mg/dl 313* 245* 151* 353* 298* 386* 163* 394* 299* 306* 163* 235*     Results from last 7 days   Lab Units 03/12/20  0549   HEMOGLOBIN A1C % 6 6*     Results from last 7 days   Lab Units 03/17/20  0412 03/15/20  0455 03/14/20  0504 03/13/20  0526 03/12/20  0549   LACTIC ACID mmol/L  --   --   --   --  1 2   PROCALCITONIN ng/ml 0 16 0 23 0 47* 0 70* 1 23*           * I Have Reviewed All Lab Data Listed Above  * Additional Pertinent Lab Tests Reviewed:  All Labs Within Last 24 Hours Reviewed     Recent Cultures (last 7 days):     Results from last 7 days   Lab Units 03/11/20  1910   C DIFF TOXIN B  Negative       Last 24 Hours Medication List:     Current Facility-Administered Medications:  acetaminophen 650 mg Oral Q6H PRN Bouchra Velasquez DO    amLODIPine 10 mg Oral Daily Rufina Fontanez MD    aspirin 81 mg Oral Daily Bouchra Velasquez DO    b complex-vitamin C-folic acid 1 capsule Oral Daily With Carl & Tony, DO    cholecalciferol 1,000 Units Oral Daily Bouchra Velasquez,     dextrose 25 mL Intravenous PRN Bouchra Velasquez,     ertapenem 500 mg Intravenous Q24H Rufina Fontanez MD Last Rate: 500 mg (03/18/20 9132)   heparin (porcine) 5,000 Units Subcutaneous [facility] [employer], DO    insulin lispro 1-5 Units Subcutaneous TID AC Joe Hernandez, DO    insulin lispro 1-5 Units Subcutaneous HS [employer], DO    pantoprazole 40 mg Oral Early Morning Bashir Lopez MD    tamsulosin 0 4 mg Oral Daily With Amberly Elias MD    vancomycin 125 mg Oral Q6H Regine Khan MD         Today, Patient Was Seen By: Bashir Lopez MD    ** Please Note: Dictation voice to text software may have been used in the creation of this document   **

## 2020-03-18 NOTE — ASSESSMENT & PLAN NOTE
Patient is now quite hypertensive  Discontinued IV fluids, and reinitiated home regimen of CCB therapy

## 2020-03-18 NOTE — PLAN OF CARE
Problem: OCCUPATIONAL THERAPY ADULT  Goal: Performs self-care activities at highest level of function for planned discharge setting  See evaluation for individualized goals  Description  Treatment Interventions: ADL retraining, UE strengthening/ROM, Endurance training, Patient/family training, Activityengagement          See flowsheet documentation for full assessment, interventions and recommendations  Outcome: Progressing  Note:   Limitation: Decreased ADL status, Decreased UE strength, Decreased Safe judgement during ADL, Decreased endurance, Decreased self-care trans, Decreased high-level ADLs     Assessment: Pt agreeable participate BUE therex, declines OOB  Completes BUE therex as per above without signs or symptoms of discomfort  Spoke with OTR who is in agreement pt will benefit from continued active OT services in attempt to facilitate increased endurance and activity tolerance to allow participation in self care activities and func txfrs and mobility       OT Discharge Recommendation: Short Term Rehab

## 2020-03-18 NOTE — ASSESSMENT & PLAN NOTE
Malnutrition Findings:   Malnutrition type: Chronic illness  Degree of Malnutrition: Other severe protein calorie malnutrition    BMI Findings: Body mass index is 23 4 kg/m²       · Continue nutritional supplements per the dietitian's recommendations

## 2020-03-18 NOTE — PHYSICAL THERAPY NOTE
PHYSICAL THERAPY NOTE          Patient Name: Analisa Davidson  [id number] Date: 3/18/2020     03/18/20 1054   Subjective   Subjective Agreeable to therapy  States he feels about the same  Transfers   Sit to Stand 4  Minimal assistance   Additional items Assist x 1; Armrests; Increased time required;Verbal cues   Stand to Sit 4  Minimal assistance   Additional items Assist x 1; Armrests;Trapeze bar;Verbal cues   Stand pivot 4  Minimal assistance   Ambulation/Elevation   Gait pattern   (Excessively slow; Short stride; Foward flexed;Decreased foot c)   Gait Assistance 4  Minimal assist  (CGA)   Additional items Assist x 1;Verbal cues   Assistive Device Rolling walker   Distance 45' x 2   Balance   Ambulatory Fair   Endurance Deficit   Endurance Deficit Yes   Activity Tolerance   Activity Tolerance Patient limited by fatigue   Exercises   Hip Flexion Sitting;20 reps   Hip Abduction Sitting;20 reps   Hip Adduction Sitting;20 reps   Knee AROM Long Arc Quad Sitting;20 reps   Ankle Pumps Sitting;20 reps   Assessment   Prognosis Fair   Problem List Decreased strength;Decreased endurance; Impaired balance;Decreased mobility   Assessment Pt  seen for PT treatment session this date with interventions consisting of therapeutic exericse and gait training w/ emphasis on improving pt's ability to ambulate  Pt  Currently performing  tx and ambulation at ( min-CGA ) x 1 level of function   Utilizing RW with decreased  balance and stability  Transfer training to increase functional task performance and  to promote safe sit/stand and stand/sit mobility  Pt  education  for hand postion and safety and technique with transfer training   Increased time to complete all tasks  Limited by weakness and fatigue   In comparison to previous session, Pt  With improvements in activity tolerance   Tolerated increased distance well     Pt is in need of continued activity in PT to improve strength balance endurance mobility transfers and ambulation with return to maximize LOF  From PT/mobility standpoint, recommendation at time of d/c would be STR  in order to promote return to PLOF and independence  Goals   LTG Expiration Date 03/26/20   PT Treatment Day 4   Plan   Treatment/Interventions Functional transfer training;LE strengthening/ROM; Therapeutic exercise; Endurance training;Bed mobility;Gait training   Progress Slow progress, decreased activity tolerance   Recommendation   Recommendation Short-term skilled PT   Pt  OOB in chair   with call bell within reach, scd's connected and turned on and alarm on at end of PT session  Discussed with Mir Ann, PT today's treatment and patient's current level of function for care coordination

## 2020-03-19 VITALS
HEIGHT: 68 IN | TEMPERATURE: 97.5 F | BODY MASS INDEX: 23.39 KG/M2 | RESPIRATION RATE: 18 BRPM | WEIGHT: 154.32 LBS | OXYGEN SATURATION: 99 % | DIASTOLIC BLOOD PRESSURE: 67 MMHG | HEART RATE: 71 BPM | SYSTOLIC BLOOD PRESSURE: 147 MMHG

## 2020-03-19 LAB
ANION GAP SERPL CALCULATED.3IONS-SCNC: 9 MMOL/L (ref 4–13)
BASOPHILS # BLD AUTO: 0.1 THOUSANDS/ΜL (ref 0–0.1)
BASOPHILS NFR BLD AUTO: 1 % (ref 0–1)
BUN SERPL-MCNC: 35 MG/DL (ref 5–25)
CALCIUM SERPL-MCNC: 8.5 MG/DL (ref 8.3–10.1)
CHLORIDE SERPL-SCNC: 103 MMOL/L (ref 100–108)
CO2 SERPL-SCNC: 25 MMOL/L (ref 21–32)
CREAT SERPL-MCNC: 2.26 MG/DL (ref 0.6–1.3)
EOSINOPHIL # BLD AUTO: 0.18 THOUSAND/ΜL (ref 0–0.61)
EOSINOPHIL NFR BLD AUTO: 3 % (ref 0–6)
ERYTHROCYTE [DISTWIDTH] IN BLOOD BY AUTOMATED COUNT: 14.6 % (ref 11.6–15.1)
GFR SERPL CREATININE-BSD FRML MDRD: 26 ML/MIN/1.73SQ M
GLUCOSE SERPL-MCNC: 152 MG/DL (ref 65–140)
GLUCOSE SERPL-MCNC: 165 MG/DL (ref 65–140)
GLUCOSE SERPL-MCNC: 265 MG/DL (ref 65–140)
HCT VFR BLD AUTO: 27.1 % (ref 36.5–49.3)
HGB BLD-MCNC: 8.7 G/DL (ref 12–17)
IMM GRANULOCYTES # BLD AUTO: 0.03 THOUSAND/UL (ref 0–0.2)
IMM GRANULOCYTES NFR BLD AUTO: 0 % (ref 0–2)
LYMPHOCYTES # BLD AUTO: 1.58 THOUSANDS/ΜL (ref 0.6–4.47)
LYMPHOCYTES NFR BLD AUTO: 22 % (ref 14–44)
MAGNESIUM SERPL-MCNC: 1.9 MG/DL (ref 1.6–2.6)
MCH RBC QN AUTO: 30.6 PG (ref 26.8–34.3)
MCHC RBC AUTO-ENTMCNC: 32.1 G/DL (ref 31.4–37.4)
MCV RBC AUTO: 95 FL (ref 82–98)
MONOCYTES # BLD AUTO: 0.57 THOUSAND/ΜL (ref 0.17–1.22)
MONOCYTES NFR BLD AUTO: 8 % (ref 4–12)
NEUTROPHILS # BLD AUTO: 4.8 THOUSANDS/ΜL (ref 1.85–7.62)
NEUTS SEG NFR BLD AUTO: 66 % (ref 43–75)
NRBC BLD AUTO-RTO: 0 /100 WBCS
PLATELET # BLD AUTO: 181 THOUSANDS/UL (ref 149–390)
PMV BLD AUTO: 11 FL (ref 8.9–12.7)
POTASSIUM SERPL-SCNC: 4.5 MMOL/L (ref 3.5–5.3)
PROCALCITONIN SERPL-MCNC: 0.12 NG/ML
RBC # BLD AUTO: 2.84 MILLION/UL (ref 3.88–5.62)
SODIUM SERPL-SCNC: 137 MMOL/L (ref 136–145)
WBC # BLD AUTO: 7.26 THOUSAND/UL (ref 4.31–10.16)

## 2020-03-19 PROCEDURE — 97110 THERAPEUTIC EXERCISES: CPT

## 2020-03-19 PROCEDURE — 85025 COMPLETE CBC W/AUTO DIFF WBC: CPT | Performed by: INTERNAL MEDICINE

## 2020-03-19 PROCEDURE — 82948 REAGENT STRIP/BLOOD GLUCOSE: CPT

## 2020-03-19 PROCEDURE — 80048 BASIC METABOLIC PNL TOTAL CA: CPT | Performed by: INTERNAL MEDICINE

## 2020-03-19 PROCEDURE — 97116 GAIT TRAINING THERAPY: CPT

## 2020-03-19 PROCEDURE — 99239 HOSP IP/OBS DSCHRG MGMT >30: CPT | Performed by: INTERNAL MEDICINE

## 2020-03-19 PROCEDURE — 83735 ASSAY OF MAGNESIUM: CPT | Performed by: INTERNAL MEDICINE

## 2020-03-19 RX ADMIN — AMLODIPINE BESYLATE 10 MG: 10 TABLET ORAL at 08:22

## 2020-03-19 RX ADMIN — INSULIN LISPRO 2 UNITS: 100 INJECTION, SOLUTION INTRAVENOUS; SUBCUTANEOUS at 11:50

## 2020-03-19 RX ADMIN — VANCOMYCIN HYDROCHLORIDE 125 MG: 500 INJECTION, POWDER, LYOPHILIZED, FOR SOLUTION INTRAVENOUS at 11:49

## 2020-03-19 RX ADMIN — PANTOPRAZOLE SODIUM 40 MG: 40 TABLET, DELAYED RELEASE ORAL at 06:16

## 2020-03-19 RX ADMIN — ERTAPENEM SODIUM 500 MG: 1 INJECTION, POWDER, LYOPHILIZED, FOR SOLUTION INTRAMUSCULAR; INTRAVENOUS at 11:49

## 2020-03-19 RX ADMIN — ASPIRIN 81 MG 81 MG: 81 TABLET ORAL at 08:22

## 2020-03-19 RX ADMIN — VITAMIN D, TAB 1000IU (100/BT) 1000 UNITS: 25 TAB at 08:22

## 2020-03-19 RX ADMIN — VANCOMYCIN HYDROCHLORIDE 125 MG: 500 INJECTION, POWDER, LYOPHILIZED, FOR SOLUTION INTRAVENOUS at 00:57

## 2020-03-19 RX ADMIN — VANCOMYCIN HYDROCHLORIDE 125 MG: 500 INJECTION, POWDER, LYOPHILIZED, FOR SOLUTION INTRAVENOUS at 06:16

## 2020-03-19 RX ADMIN — INSULIN LISPRO 1 UNITS: 100 INJECTION, SOLUTION INTRAVENOUS; SUBCUTANEOUS at 08:22

## 2020-03-19 RX ADMIN — HEPARIN SODIUM 5000 UNITS: 5000 INJECTION INTRAVENOUS; SUBCUTANEOUS at 06:16

## 2020-03-19 NOTE — SOCIAL WORK
Pt and significant other are agreeable to a referral to the 5th floor only  If no bed they plan home on dc with homecare(RN, PT and OT)  Referral made, CM will f/u

## 2020-03-19 NOTE — ASSESSMENT & PLAN NOTE
The patient was evaluated by physical therapy and occupational therapy - Recommendations for short-term rehabilitation placement upon discharge  However, according to Case Management as his SNF of choice did not have bed availability the patient has refused any other placement and requesting discharge home  Will ensure Home Health for PT/OT and nursing

## 2020-03-19 NOTE — SOCIAL WORK
PT/OT still recommending STR for pt  Pt had been refusing, CM will be following up with pt today to see if pt is receptive to STR

## 2020-03-19 NOTE — DISCHARGE SUMMARY
Discharge- Mike Leon 1940, 79 y o  male MRN: [id number]    Unit/Bed#: 415-01 Encounter: [id number]    Primary Care Provider: Kaitlyn Álvarez DO   Date and time admitted to hospital: 3/11/2020  9:12 AM        Acute cystitis with hematuria  Assessment & Plan  ESBL E Coli by cultures  Discontinued Vancomycin and Pip-Torrey, and initiated on renally dosed Ertapenem, although Procalcitonin had been improving  Sudhakar Ruffing growth <20,000  Changed Haile Catheter  - Procalcitonin has decreased nicely and remains normalized, and remainder of infectious work-up negative  Discussed case with ID, who agrees with plans - recommends one last dose of IV Ertapenem today prior to discharge  Chronic indwelling Haile catheter  Assessment & Plan  Urinary tract infection associated with indwelling Haile catheter - culture results + ESBL E  Coli  Haile changed  Generalized weakness  Assessment & Plan  The patient was evaluated by physical therapy and occupational therapy - Recommendations for short-term rehabilitation placement upon discharge  However, according to Case Management as his SNF of choice did not have bed availability the patient has refused any other placement and requesting discharge home  Will ensure Home Health for PT/OT and nursing  * Sepsis ([facility] [address] )  Assessment & Plan  Sepsis was present on admission and secondary to Complicated UTI associated with an indwelling Haile catheter  SIRS criteria was met with hypothermia and a leukocytosis  - Lactic acid level within normal limits  - Procalcitonin improving and remains normalized  - Was maintained on IV Vancomycin and Pip-Torrey X5 days, changed to Ertapenem, now day#3  - According to discussion with ID, above therapy adequate for current infection  No further antibiotics upon discharge  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  Assessment & Plan  Was maintained on oral vancomycin 125 mg while on antibiotic therapy        Diastolic dysfunction  Assessment & Plan  Monitor patient's volume status closely    Bradycardia  Assessment & Plan  Transient bradycardia on 03/15/2020 with no recurrence by telemetry  Discontinued Tele previously  Low TSH level  Assessment & Plan  Mildly low TSH with normal free T4 in setting of acute illness  Recheck TSH level as outpatient    Neuroendocrine tumor  Assessment & Plan  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 - looks unchanged overall  Receiving lanreotide infusions as an outpatient with Hematology/Oncology    Severe protein-calorie malnutrition ([address] )  Assessment & Plan  Malnutrition Findings:   Malnutrition type: Chronic illness  Degree of Malnutrition: Other severe protein calorie malnutrition    BMI Findings: Body mass index is 23 46 kg/m²  · Continue nutritional supplements per the dietitian's recommendations    Syncope and collapse  Assessment & Plan  Episode in the [facility] while receiving lanreotide infusion  May have been on basis of acute illness/sepsis  No further episodes since admission  Stage 4 chronic kidney disease (HCC)  Assessment & Plan  Baseline serum creatinine of 1 9-2 4 mg/dl  Avoid nephrotoxic agents  Of note, patient has Hiprex listed as a chronic home medication - as this is contraindicated given his renal impairment it will be discontinued upon discharge  Type 2 diabetes mellitus with hypoglycemia without coma, with long-term current use of insulin [facility])  Assessment & Plan  Lab Results   Component Value Date    HGBA1C 6 6 (H) 03/12/2020       Recent Labs     03/18/20  1553 03/18/20 2004 03/19/20  0729 03/19/20  1123   POCGLU 313* 393* 165* 265*       Blood Sugar Average: Last 72 hrs:  (P) 278 [id number]     - Episode of hypoglycemia resolved - Current sugars in the 100-300's range  Resume home regimen at discharge  Hypertension  Assessment & Plan  BP Stable on home regimen of CCB therapy        Discharging Physician / Practitioner: David Cornell MD  PCP: Cortez Carrington DO  Admission Date:   Admission Orders (From admission, onward)     Ordered        03/11/20 1238  Inpatient Admission  Once                   Discharge Date: 03/19/20    Resolved Problems  Date Reviewed: 3/19/2020    None          Consultations During Hospital Stay:  · None    Procedures Performed:   · None    Significant Findings / Test Results:   Ct Chest Abdomen Pelvis Wo Contrast    Result Date: 3/11/2020  Impression: No evidence of acute abnormality in the chest, abdomen or pelvis within the limits of this unenhanced exam  Unchanged ectasia of the ascending aorta measuring 4 3 cm in diameter  Stable centrally calcified retractile mesenteric mass in keeping with patient's known carcinoid tumor  Known hepatic metastases are discernible but interval changes in size are difficult to evaluate in the absence of intravenous contrast  Unchanged periportal and left para-aortic nonenlarged and borderline lymph nodes, nonspecific  Workstation performed: [id number]     Xr Chest Portable    Result Date: 3/11/2020  Impression: No acute cardiopulmonary disease  Workstation performed: [id number]     Ct Head Without Contrast    Result Date: 3/11/2020  Impression: No acute intracranial abnormality  Workstation performed: [id number]     Incidental Findings:   · None     Test Results Pending at Discharge (will require follow up): · None     Outpatient Tests Requested:  · None    Complications:  None    Reason for Admission: Syncope    Hospital Course:      Isaak Myers is a 79 y o  male who presented to the emergency department after being a medical emergency alert in the [facility] at [address] [place] [address]  The patient was receiving an IV lanreotide infusion for treatment of his metastatic neuroendocrine tumor  He experienced a syncopal episode and was found to have hypotension    The patient had also been experiencing generalized weakness over the few days prior to his admission  He was also under treatment for a Clostridium difficile infection with PO vancomycin  He was diagnosed with sepsis with a urinary source  Following admission the patient was maintained on IV antibiotic therapy  His urine showed evidence of ESBL E  Coli, and his antibiotic was changed from Zosyn to Ertapenem  Procalcitonin was checked and normalized prior to discharge  His Haile catheter was changed  The patient is stable - recommendations were made for short-term rehab, but the [facility] only agreed to rehab at the [address] [place] [address] which did not have bed availability  He refused all other SNF placement, and is being tentatively discharged home with home health for PT/OT and nursing  He is stable from a medical standpoint at time of discharge  Please see above list of diagnoses and related plan for additional information  Condition at Discharge: stable     Discharge Day Visit / Exam:     Vitals: Blood Pressure: 147/67 (03/19/20 0726)  Pulse: 71 (03/19/20 0726)  Temperature: 97 5 °F (36 4 °C) (03/19/20 0726)  Temp Source: Temporal (03/18/20 2255)  Respirations: 18 (03/19/20 0726)  Height: 5' 8" (172 7 cm) (03/11/20 0917)  Weight - Scale: 70 kg (154 lb 5 2 oz) (03/19/20 0600)  SpO2: 99 % (03/19/20 0726)  Exam:  Constitutional: He is oriented to person, place, and time  He appears well-developed and well-nourished  Eyes: EOM are normal    Neck: Neck supple  Cardiovascular: Normal rate and regular rhythm  Pulmonary/Chest: Effort normal and breath sounds normal  No stridor  He has no wheezes  He has no rales  Abdominal: Soft  Bowel sounds are normal  He exhibits no distension  There is no tenderness  Musculoskeletal: He exhibits no edema  : Haile Catheter in place, draining clear yellow urine  Neurological: He is alert and oriented to person, place, and time  Psychiatric: He has a normal mood and affect   His behavior is normal      Discharge instructions/Information to patient and family:   See after visit summary for information provided to patient and family  Provisions for Follow-Up Care:  See after visit summary for information related to follow-up care and any pertinent home health orders  Disposition:     Home with VNA Services (Reminder: Complete face to face encounter)    For Discharges to [facility] SNF:   · Not Applicable to this Patient - Not Applicable to this Patient    Planned Readmission: No     Discharge Statement:  I spent 35 minutes discharging the patient  This time was spent on the day of discharge  I had direct contact with the patient on the day of discharge  Greater than 50% of the total time was spent examining patient, answering all patient questions, arranging and discussing plan of care with patient as well as directly providing post-discharge instructions  Additional time then spent on discharge activities  Discharge Medications:  See after visit summary for reconciled discharge medications provided to patient and family        ** Please Note: This note has been constructed using a voice recognition system **

## 2020-03-19 NOTE — ASSESSMENT & PLAN NOTE
Lab Results   Component Value Date    HGBA1C 6 6 (H) 03/12/2020       Recent Labs     03/18/20  1553 03/18/20 2004 03/19/20  0729 03/19/20  1123   POCGLU 313* 393* 165* 265*       Blood Sugar Average: Last 72 hrs:  (P) 278 [id number]     - Episode of hypoglycemia resolved - Current sugars in the 100-300's range  Resume home regimen at discharge

## 2020-03-19 NOTE — ASSESSMENT & PLAN NOTE
Sepsis was present on admission and secondary to Complicated UTI associated with an indwelling Haile catheter  SIRS criteria was met with hypothermia and a leukocytosis  - Lactic acid level within normal limits  - Procalcitonin improving and remains normalized  - Was maintained on IV Vancomycin and Pip-Torrey X5 days, changed to Ertapenem, now day#3  - According to discussion with ID, above therapy adequate for current infection  No further antibiotics upon discharge

## 2020-03-19 NOTE — PHYSICAL THERAPY NOTE
PHYSICAL THERAPY NOTE          Patient Name: Daniel Durán  DEFPW'R Date: 3/19/2020     03/19/20 1009   Restrictions/Precautions   Other Precautions Chair Alarm; Bed Alarm  (Contact/isolation; Fall Risk)   Subjective   Subjective Reports he feels a little better  Agreeable to therapy  Bed Mobility   Supine to Sit 6  Modified independent   Additional items Assist x 1;Bedrails; Increased time required;Verbal cues   Transfers   Sit to Stand 6  Modified independent   Additional items Assist x 1;Bedrails;Armrests; Increased time required;Verbal cues   Stand to Sit 6  Modified independent   Additional items Assist x 1; Armrests; Increased time required;Verbal cues   Stand pivot 5  Supervision   Ambulation/Elevation   Gait pattern   (Excessively slow; Short stride; Foward flexed;Decreased foot c)   Gait Assistance   (CGA)   Additional items Assist x 1;Verbal cues   Assistive Device Rolling walker   Distance 110'   Balance   Ambulatory Fair   Endurance Deficit   Endurance Deficit Yes   Activity Tolerance   Activity Tolerance Patient limited by fatigue   Exercises   Hip Flexion Sitting;20 reps   Hip Abduction Sitting;20 reps   Hip Adduction Sitting;20 reps   Knee AROM Long Arc Quad Sitting;20 reps   Ankle Pumps Sitting;20 reps   Assessment   Prognosis Good   Problem List Decreased strength;Decreased endurance; Impaired balance;Decreased mobility   Assessment Pt  seen for PT treatment session this date with interventions consisting of therapeutic exericse and gait training w/ emphasis on improving pt's ability to ambulate  Pt  Currently performing  tx and ambulation at ( mod I-CGA ) x 1 level of function   Utilizing RW with decreased  balance and stability   Increased time to complete all tasks  Limited by weakness and fatigue   In comparison to previous session, Pt  With improvements in activity tolerance  Continues to progress distance    Pt is in need of continued activity in PT to improve strength balance endurance mobility transfers and ambulation with return to maximize LOF  From PT/mobility standpoint, recommendation at time of d/c would be home PT   in order to promote return to PLOF and independence  Goals   LTG Expiration Date 03/26/20   PT Treatment Day 5   Plan   Treatment/Interventions Functional transfer training;LE strengthening/ROM; Therapeutic exercise; Endurance training;Bed mobility;Gait training   Progress Progressing toward goals   Recommendation   Recommendation Home PT   Pt  OOB in chair   with call bell within reach, scd's connected and turned on and alarm on at end of PT session  Discussed with Gladys Castro, PT today's treatment and patient's current level of function for care coordination

## 2020-03-19 NOTE — SOCIAL WORK
CM spoke with pt about SNF/STR, pt still does not want to go to STR but asked that CM contact his s/o:Cyndee Toribio CM spoke with Antony Olivares who wants to speak with pt and will be calling CM back shortly

## 2020-03-19 NOTE — PLAN OF CARE
Problem: Potential for Falls  Goal: Patient will remain free of falls  Description  INTERVENTIONS:  - Assess patient frequently for physical needs  -  Identify cognitive and physical deficits and behaviors that affect risk of falls  (fatigue, weakness)  -  [place] fall precautions as indicated by assessment  (high fall risk)  - Educate patient/family on patient safety including physical limitations  - Instruct patient to call for assistance with activity based on assessment  - Modify environment to reduce risk of injury  - Consider OT/PT consult to assist with strengthening/mobility    Outcome: Progressing     Problem: Prexisting or High Potential for Compromised Skin Integrity  Goal: Skin integrity is maintained or improved  Description  INTERVENTIONS:  - Identify patients at risk for skin breakdown  - Assess and monitor skin integrity  - Assess and monitor nutrition and hydration status  - Monitor labs   - Assess for incontinence   - Turn and reposition patient (every 2 hours and prn)  - Assist with mobility/ambulation (mod to max assist and walker)  - Relieve pressure over bony prominences  - Avoid friction and shearing  - Provide appropriate hygiene as needed including keeping skin clean and dry  - Evaluate need for skin moisturizer/barrier cream  - Collaborate with interdisciplinary team   - Patient/family teaching  - Consider wound care consult    Outcome: Progressing     Problem: Nutrition/Hydration-ADULT  Goal: Nutrient/Hydration intake appropriate for improving, restoring or maintaining nutritional needs  Description  Monitor and assess patient's nutrition/hydration status for malnutrition  Collaborate with interdisciplinary team and initiate plan and interventions as ordered  Monitor patient's weight and dietary intake as ordered or per policy  Utilize nutrition screening tool and intervene as necessary  Determine patient's food preferences and provide high-protein, high-caloric foods as appropriate  INTERVENTIONS:  - Monitor oral intake, urinary output, labs, and treatment plans  - Assess nutrition and hydration status and recommend course of action  - Evaluate amount of meals eaten  - Assist patient with eating if necessary   - Allow adequate time for meals  - Recommend/ encourage appropriate diets, oral nutritional supplements, and vitamin/mineral supplements  - Order, calculate, and assess calorie counts as needed  - Assess need for intravenous fluids  - Provide specific nutrition/hydration education as appropriate  - Include patient/family/caregiver in decisions related to nutrition   Outcome: Progressing     Problem: DISCHARGE PLANNING - CARE MANAGEMENT  Goal: Discharge to post-acute care or home with appropriate resources  Description  INTERVENTIONS:  - Conduct assessment to determine patient/family and health care team treatment goals, and need for post-acute services based on payer coverage, community resources, and patient preferences, and barriers to discharge  - Address psychosocial, clinical, and financial barriers to discharge as identified in assessment in conjunction with the patient/family and health care team  - Arrange appropriate level of post-acute services according to patient's   needs and preference and payer coverage in collaboration with the physician and health care team  - Communicate with and update the patient/family, physician, and health care team regarding progress on the discharge plan  - Arrange appropriate transportation to post-acute venues  -? STR vs home with homecare/home PT and OP follow up    Outcome: Progressing     Problem: PAIN - ADULT  Goal: Verbalizes/displays adequate comfort level or baseline comfort level  Description  Interventions:  - Encourage patient to monitor pain and request assistance  - Assess pain using appropriate pain scale (0-10 pain scale)  - Administer analgesics based on type and severity of pain and evaluate response  - Implement non-pharmacological measures as appropriate and evaluate response  - Consider cultural and social influences on pain and pain management  - Notify physician/advanced practitioner if interventions unsuccessful or patient reports new pain   Outcome: Progressing     Problem: INFECTION - ADULT  Goal: Absence or prevention of progression during hospitalization  Description  INTERVENTIONS:  - Assess and monitor for signs and symptoms of infection  - Monitor lab/diagnostic results  - Monitor all insertion sites, i e  indwelling lines  - Administer medications as ordered  - Instruct and encourage patient and family to use good hand hygiene technique  - Identify and instruct in appropriate isolation precautions for identified infection/condition (contact precautions)   Outcome: Progressing     Problem: SAFETY ADULT  Goal: Maintain or return to baseline ADL function  Description  INTERVENTIONS:  -  Assess patient's ability to carry out ADLs; (dependent for cares)  - Assess/evaluate cause of self-care deficits (fatigue, weakness)  - Assess range of motion  - Assess patient's mobility; (mod to max assist and walker)  - Assess patient's need for assistive devices and provide as appropriate (walker)  - Encourage maximum independence but intervene and supervise when necessary  - Involve family in performance of ADLs  - Assess for home care needs following discharge   - Consider OT consult to assist with ADL evaluation and planning for discharge  - Provide patient education as appropriate   Outcome: Progressing  Goal: Maintain or return mobility status to optimal level  Description  INTERVENTIONS:  - Assess patient's baseline mobility status (walker and assistance from spouse)    - Identify cognitive and physical deficits and behaviors that affect mobility (fatigue, weakness)  - Identify mobility aids required to assist with transfers and/or ambulation (gait belt, walker)  - [place] fall precautions as indicated by assessment (high fall risk)  - Record patient progress and toleration of activity level on Mobility SBAR; progress patient to next Phase/Stage  - Instruct patient to call for assistance with activity based on assessment  - Consider rehabilitation consult to assist with strengthening/weightbearing, etc    Outcome: Progressing     Problem: DISCHARGE PLANNING  Goal: Discharge to home or other facility with appropriate resources  Description  INTERVENTIONS:  - Identify barriers to discharge w/patient and caregiver  - Arrange for needed discharge resources and transportation as appropriate  - Identify discharge learning needs (meds, wound care, etc )  - Refer to Case Management Department for coordinating discharge planning if the patient needs post-hospital services based on physician/advanced practitioner order or complex needs related to functional status, cognitive ability, or social support system   Outcome: Progressing     Problem: Knowledge Deficit  Goal: Patient/family/caregiver demonstrates understanding of disease process, treatment plan, medications, and discharge instructions  Description  Complete learning assessment and assess knowledge base    Interventions:  - Provide teaching at level of understanding  - Provide teaching via preferred learning methods  Outcome: Progressing     Problem: GENITOURINARY - ADULT  Goal: Urinary catheter remains patent  Description  INTERVENTIONS:  - Assess patency of urinary catheter  - If patient has a chronic garcia, consider changing catheter if non-functioning (garcia is functioning)  - Follow guidelines for intermittent irrigation of non-functioning urinary catheter   Outcome: Progressing     Problem: METABOLIC, FLUID AND ELECTROLYTES - ADULT  Goal: Electrolytes maintained within normal limits  Description  INTERVENTIONS:  - Monitor labs and assess patient for signs and symptoms of electrolyte imbalances  - Administer electrolyte replacement as ordered  - Monitor response to electrolyte replacements, including repeat lab results as appropriate  - Instruct patient on fluid and nutrition as appropriate  Outcome: Progressing  Goal: Fluid balance maintained  Description  INTERVENTIONS:  - Monitor labs   - Monitor I/O and WT  - Instruct patient on fluid and nutrition as appropriate  - Assess for signs & symptoms of volume excess or deficit  Outcome: Progressing  Goal: Glucose maintained within target range  Description  INTERVENTIONS:  - Monitor Blood Glucose as ordered  - Assess for signs and symptoms of hyperglycemia and hypoglycemia  - Administer ordered medications to maintain glucose within target range  - Assess nutritional intake and initiate nutrition service referral as needed  Outcome: Progressing

## 2020-03-19 NOTE — SOCIAL WORK
A post acute care recommendation was made by your care team for [place] 77  Discussed Freedom of Choice with patient and significant other  Choice is to return/continue services  [facility] homecare

## 2020-03-19 NOTE — SOCIAL WORK
No bed on the 5th floor  CM notified pt's significant other who stated pt will not go to any other facility and wants to go home with resumption of homecare services from [facility]

## 2020-03-19 NOTE — ASSESSMENT & PLAN NOTE
ESBL E Coli by cultures  Discontinued Vancomycin and Pip-Torrey, and initiated on renally dosed Ertapenem, although Procalcitonin had been improving  [facility] growth <20,000  Changed Haile Catheter  - Procalcitonin has decreased nicely and remains normalized, and remainder of infectious work-up negative  Discussed case with ID, who agrees with plans - recommends one last dose of IV Ertapenem today prior to discharge

## 2020-03-19 NOTE — ASSESSMENT & PLAN NOTE
Episode in the [facility] while receiving lanreotide infusion  May have been on basis of acute illness/sepsis  No further episodes since admission

## 2020-03-19 NOTE — SOCIAL WORK
Pt is being dc'd home on this date and CM scheduled pt's follow up appointment with is PCP Dr Alejandrina Al for Monday March 23rd at 1:15pm  CM attempted to schedule a follow up appointment with Urology but the [facility] office is currently closed and [facility] need [facility] [place] [facility]  Pt/significant other will be calling to schedule that appointment

## 2020-03-19 NOTE — ASSESSMENT & PLAN NOTE
Was maintained on oral vancomycin 125 mg while on antibiotic therapy  Continue as patient was under treatment for C  Diff per review of H&P Records

## 2020-03-19 NOTE — NURSING NOTE
AVS printed and gone over with patient and significant other  Both voiced understanding and had no questions at discharge  Patient left via wheelchair accompanied by significant other and PCA

## 2020-03-23 ENCOUNTER — OFFICE VISIT (OUTPATIENT)
Dept: NEPHROLOGY | Facility: CLINIC | Age: 80
End: 2020-03-23
Payer: COMMERCIAL

## 2020-03-23 VITALS
DIASTOLIC BLOOD PRESSURE: 64 MMHG | HEART RATE: 101 BPM | WEIGHT: 147 LBS | BODY MASS INDEX: 22.28 KG/M2 | SYSTOLIC BLOOD PRESSURE: 136 MMHG | OXYGEN SATURATION: 99 % | TEMPERATURE: 97.8 F | HEIGHT: 68 IN

## 2020-03-23 DIAGNOSIS — E83.42 HYPOMAGNESEMIA: Chronic | ICD-10-CM

## 2020-03-23 DIAGNOSIS — N39.0 RECURRENT UTI: ICD-10-CM

## 2020-03-23 DIAGNOSIS — N18.30 ANEMIA DUE TO STAGE 3 CHRONIC KIDNEY DISEASE (HCC): ICD-10-CM

## 2020-03-23 DIAGNOSIS — E43 SEVERE PROTEIN-CALORIE MALNUTRITION (HCC): ICD-10-CM

## 2020-03-23 DIAGNOSIS — K21.9 GASTROESOPHAGEAL REFLUX DISEASE WITHOUT ESOPHAGITIS: ICD-10-CM

## 2020-03-23 DIAGNOSIS — I10 ESSENTIAL HYPERTENSION: Chronic | ICD-10-CM

## 2020-03-23 DIAGNOSIS — D63.1 ANEMIA DUE TO STAGE 3 CHRONIC KIDNEY DISEASE (HCC): ICD-10-CM

## 2020-03-23 DIAGNOSIS — N18.4 STAGE 4 CHRONIC KIDNEY DISEASE (HCC): Primary | Chronic | ICD-10-CM

## 2020-03-23 DIAGNOSIS — Z79.4 TYPE 2 DIABETES MELLITUS WITH HYPOGLYCEMIA WITHOUT COMA, WITH LONG-TERM CURRENT USE OF INSULIN (HCC): ICD-10-CM

## 2020-03-23 DIAGNOSIS — E21.0 HYPERPARATHYROID BONE DISEASE (HCC): ICD-10-CM

## 2020-03-23 DIAGNOSIS — E11.649 TYPE 2 DIABETES MELLITUS WITH HYPOGLYCEMIA WITHOUT COMA, WITH LONG-TERM CURRENT USE OF INSULIN (HCC): ICD-10-CM

## 2020-03-23 DIAGNOSIS — C7A.8 MALIGNANT NEUROENDOCRINE NEOPLASM (HCC): ICD-10-CM

## 2020-03-23 DIAGNOSIS — Z97.8 CHRONIC INDWELLING FOLEY CATHETER: Chronic | ICD-10-CM

## 2020-03-23 DIAGNOSIS — A04.72 CLOSTRIDIUM DIFFICILE DIARRHEA: Chronic | ICD-10-CM

## 2020-03-23 DIAGNOSIS — N25.81 SECONDARY HYPERPARATHYROIDISM OF RENAL ORIGIN (HCC): ICD-10-CM

## 2020-03-23 PROBLEM — A41.9 SEPSIS (HCC): Status: RESOLVED | Noted: 2020-03-11 | Resolved: 2020-03-23

## 2020-03-23 PROBLEM — R06.02 SHORTNESS OF BREATH: Status: RESOLVED | Noted: 2019-12-10 | Resolved: 2020-03-23

## 2020-03-23 PROBLEM — N30.01 ACUTE CYSTITIS WITH HEMATURIA: Chronic | Status: RESOLVED | Noted: 2019-06-03 | Resolved: 2020-03-23

## 2020-03-23 PROBLEM — N13.4 HYDROURETER: Chronic | Status: RESOLVED | Noted: 2019-01-23 | Resolved: 2020-03-23

## 2020-03-23 PROBLEM — E87.2 INCREASED ANION GAP METABOLIC ACIDOSIS: Status: RESOLVED | Noted: 2020-03-11 | Resolved: 2020-03-23

## 2020-03-23 PROBLEM — K81.0 ACUTE CHOLECYSTITIS: Status: RESOLVED | Noted: 2019-09-06 | Resolved: 2020-03-23

## 2020-03-23 PROBLEM — K85.90 ACUTE PANCREATITIS WITHOUT INFECTION OR NECROSIS: Status: RESOLVED | Noted: 2019-09-26 | Resolved: 2020-03-23

## 2020-03-23 PROBLEM — R77.8 ELEVATED TOTAL PROTEIN: Status: RESOLVED | Noted: 2020-03-11 | Resolved: 2020-03-23

## 2020-03-23 PROBLEM — E44.0 MODERATE PROTEIN-CALORIE MALNUTRITION (HCC): Chronic | Status: RESOLVED | Noted: 2019-06-07 | Resolved: 2020-03-23

## 2020-03-23 PROBLEM — E87.0 HYPERNATREMIA: Status: RESOLVED | Noted: 2020-02-08 | Resolved: 2020-03-23

## 2020-03-23 PROBLEM — E87.6 ACUTE HYPOKALEMIA: Chronic | Status: RESOLVED | Noted: 2020-03-04 | Resolved: 2020-03-23

## 2020-03-23 PROBLEM — K83.8 BILIARY SLUDGE: Status: RESOLVED | Noted: 2019-06-18 | Resolved: 2020-03-23

## 2020-03-23 PROBLEM — T85.518A CHOLECYSTOSTOMY TUBE DYSFUNCTION: Status: RESOLVED | Noted: 2019-09-26 | Resolved: 2020-03-23

## 2020-03-23 PROBLEM — E87.5 HYPERKALEMIA: Status: RESOLVED | Noted: 2020-03-11 | Resolved: 2020-03-23

## 2020-03-23 PROCEDURE — 99214 OFFICE O/P EST MOD 30 MIN: CPT | Performed by: INTERNAL MEDICINE

## 2020-03-23 RX ORDER — METHENAMINE HIPPURATE 1000 MG/1
1 TABLET ORAL 3 TIMES DAILY
Qty: 90 TABLET | Refills: 5
Start: 2020-03-23 | End: 2020-04-27 | Stop reason: HOSPADM

## 2020-03-23 RX ORDER — POTASSIUM CHLORIDE 20 MEQ/1
20 TABLET, EXTENDED RELEASE ORAL 3 TIMES WEEKLY
Qty: 12 TABLET | Refills: 0
Start: 2020-03-23 | End: 2020-04-09 | Stop reason: SDUPTHER

## 2020-03-23 NOTE — PROGRESS NOTES
[facility] [place], [place]    Name: Analisa Davidson  YOB: 1940      Assessment/Plan:    Stage 4 chronic kidney disease [facility])  Renal function is stable, continue closely monitor for now  Recurrent UTI    Continue with methenamine hippurate 1 tablet 3 times a day  Hypertension    Continue monitor patient's blood pressures medications are appropriate  No adjustments necessary today  Clostridium difficile diarrhea    Patient actively taking oral vancomycin  Continue according to discharge instructions  Chronic indwelling Haile catheter    Continue with chronic suppression of urinary tract infections with methenamine hippurate  Problem List Items Addressed This Visit        Digestive    Clostridium difficile diarrhea (Chronic)       Patient actively taking oral vancomycin  Continue according to discharge instructions  Relevant Medications    methenamine hippurate (HIPREX) 1 g tablet    potassium chloride (K-DUR,KLOR-CON) 20 mEq tablet    Gastroesophageal reflux disease without esophagitis       Endocrine    Type 2 diabetes mellitus with hypoglycemia without coma, with long-term current use of insulin (HCC)       Cardiovascular and Mediastinum    Hypertension (Chronic)       Continue monitor patient's blood pressures medications are appropriate  No adjustments necessary today  Musculoskeletal and Integument    Hyperparathyroid bone disease (HCC)       Genitourinary    Stage 4 chronic kidney disease ([facility] ) - Primary (Chronic)     Renal function is stable, continue closely monitor for now  Relevant Orders    Comprehensive metabolic panel    CBC    Magnesium    Recurrent UTI       Continue with methenamine hippurate 1 tablet 3 times a day           Relevant Medications    methenamine hippurate (HIPREX) 1 g tablet       Other    Chronic indwelling Haile catheter (Chronic)       Continue with chronic suppression of urinary tract infections with methenamine hippurate  Hypomagnesemia (Chronic)    Relevant Orders    Magnesium    Malignant neuroendocrine neoplasm (HCC)    Anemia    Severe protein-calorie malnutrition ([facility] )      Other Visit Diagnoses     Secondary hyperparathyroidism of renal origin ([facility] )        Relevant Orders    PTH, intact            Subjective:      Patient ID: Danny Thomas is a 79 y o  male  Patient's hospitalization was removed  He is doing better now and is eating and drinking better  Hypertension   This is a chronic problem  The current episode started more than 1 year ago  The problem is unchanged  The problem is controlled  Pertinent negatives include no chest pain or orthopnea  There are no associated agents to hypertension  There are no known risk factors for coronary artery disease  Past treatments include calcium channel blockers  There are no compliance problems  Hypertensive end-organ damage includes kidney disease  Identifiable causes of hypertension include chronic renal disease  The following portions of the patient's history were reviewed and updated as appropriate: allergies, current medications, past family history, past medical history, past social history, past surgical history and problem list     Review of Systems   Cardiovascular: Negative for chest pain and orthopnea  All other systems reviewed and are negative  Social History     Socioeconomic History    Marital status:       Spouse name: Not on file    Number of children: Not on file    Years of education: Not on file    Highest education level: Not on file   Occupational History    Not on file   Social Needs    Financial resource strain: Not on file    Food insecurity:     Worry: Not on file     Inability: Not on file    Transportation needs:     Medical: Not on file     Non-medical: Not on file   Tobacco Use    Smoking status: Never Smoker    Smokeless tobacco: Never Used   Substance and Sexual Activity    Alcohol use: Not Currently     Alcohol/week: 0 0 standard drinks     Frequency: Never    Drug use: Never    Sexual activity: Not Currently   Lifestyle    Physical activity:     Days per week: Not on file     Minutes per session: Not on file    Stress: Not on file   Relationships    Social connections:     Talks on phone: Not on file     Gets together: Not on file     Attends Hoahaoism service: Not on file     Active member of club or organization: Not on file     Attends meetings of clubs or organizations: Not on file     Relationship status: Not on file    Intimate partner violence:     Fear of current or ex partner: Not on file     Emotionally abused: Not on file     Physically abused: Not on file     Forced sexual activity: Not on file   Other Topics Concern    Not on file   Social History Narrative    Not on file     Past Medical History:   Diagnosis Date    Acute pancreatitis without infection or necrosis 9/26/2019    Anemia of chronic renal failure     BPH (benign prostatic hyperplasia)     Cancer ([facility] )     liver cancer    Cardiac disease     Chronic kidney disease, stage 3 ([facility] )     Coronary artery disease     Diabetes mellitus ([facility] )     DJD (degenerative joint disease)     Essential hypertension     Gait disturbance     Generalized weakness     GERD (gastroesophageal reflux disease)     Gout     History of transfusion     Hydronephrosis     Hyperlipidemia     Hypertension     [facility] )     Pressure injury of skin     Proteinuria     Renal disorder     Retention of urine     Thrombophlebitis migrans     Type 2 diabetes mellitus ([facility] )     Type 2 diabetes 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, with long-term current use of insulin ([facility] ) 1/23/2019     Past Surgical History:   Procedure Laterality Date    CHOLECYSTECTOMY      CHOLECYSTECTOMY LAPAROSCOPIC N/A 2/7/2020    Procedure: CHOLECYSTECTOMY LAPAROSCOPIC;  Surgeon:  Yogi Thrasher MD;  Location: [address] [place] [address] OR;  Service: General    CORONARY ANGIOPLASTY WITH STENT PLACEMENT      IR IMAGE GUIDED BIOPSY/ASPIRATION LIVER  1/24/2019    IR TUBE PLACEMENT ROQUE  9/6/2019       Current Outpatient Medications:     acetaminophen (TYLENOL) 325 mg tablet, Take 650 mg by mouth every 6 (six) hours as needed , Disp: , Rfl:     amLODIPine (NORVASC) 10 mg tablet, Take 10 mg by mouth daily, Disp: , Rfl:     aspirin 81 MG tablet, Take 81 mg by mouth daily, Disp: , Rfl:     b complex-vitamin C-folic acid (NEPHROCAPS) 1 mg capsule, Take 1 capsule by mouth daily with dinner, Disp: 60 capsule, Rfl: 0    BD INSULIN SYRINGE U/F 30G X 1/2" 0 3 ML MISC, , Disp: , Rfl:     cholecalciferol (VITAMIN D3) 1,000 units tablet, Take 1,000 Units by mouth daily, Disp: , Rfl:     colchicine (COLCRYS) 0 6 mg tablet, Take 0 5 tablets (0 3 mg total) by mouth daily (Patient taking differently: Take 0 6 mg by mouth daily ), Disp: 15 tablet, Rfl: 2    insulin detemir (LEVEMIR) 100 units/mL subcutaneous injection, Inject 10 Units under the skin daily before breakfast, Disp: , Rfl:     insulin lispro (HumaLOG) 100 units/mL injection, Inject under the skin 3 (three) times a day before meals, Disp: , Rfl:     Insulin Syringe-Needle U-100 30G X 1/2" 1 ML MISC, by Does not apply route 3 (three) times a day, Disp: 100 each, Rfl: 5    magnesium oxide (MAG-OX) 400 mg, Take 1 tablet (400 mg total) by mouth 2 (two) times a day, Disp: 60 tablet, Rfl: 0    oxyCODONE-acetaminophen (PERCOCET) 5-325 mg per tablet, Take 1 tablet by mouth every 4 (four) hours as needed for moderate pain, Disp: , Rfl:     pantoprazole (PROTONIX) 40 mg tablet, Take 1 tablet (40 mg total) by mouth daily in the early morning, Disp: , Rfl: 0    potassium chloride (K-DUR,KLOR-CON) 20 mEq tablet, Take 1 tablet (20 mEq total) by mouth 3 (three) times a week, Disp: 12 tablet, Rfl: 0    simethicone (MYLICON) 80 mg chewable tablet, Chew 1 tablet (80 mg total) every 6 (six) hours as needed for flatulence, Disp: 90 tablet, Rfl: 5    tamsulosin (FLOMAX) 0 4 mg, Take 1 capsule (0 4 mg total) by mouth daily with dinner, Disp: , Rfl: 0    vancomycin (VANCOCIN) 50mg/mL SOLN, Take 2 5 mL (125 mg total) by mouth every 6 (six) hours for 10 days, THEN 2 5 mL (125 mg total) every 12 (twelve) hours for 7 days, THEN 2 5 mL (125 mg total) daily for 7 days, THEN 2 5 mL (125 mg total) every other day , Disp: 190 mL, Rfl: 0    methenamine hippurate (HIPREX) 1 g tablet, Take 1 tablet (1 g total) by mouth 3 (three) times a day, Disp: 90 tablet, Rfl: 5     Lab Results   Component Value Date    SODIUM 137 03/19/2020    K 4 5 03/19/2020     03/19/2020    CO2 25 03/19/2020    AGAP 9 03/19/2020    BUN 35 (H) 03/19/2020    CREATININE 2 26 (H) 03/19/2020    GLUC 152 (H) 03/19/2020    GLUF 116 (H) 02/24/2020    CALCIUM 8 5 03/19/2020    AST 14 03/17/2020    ALT 23 03/17/2020    ALKPHOS 152 (H) 03/17/2020    TP 5 9 (L) 03/17/2020    TBILI 0 30 03/17/2020    EGFR 26 03/19/2020     Lab Results   Component Value Date    WBC 7 26 03/19/2020    HGB 8 7 (L) 03/19/2020    HCT 27 1 (L) 03/19/2020    MCV 95 03/19/2020     03/19/2020     Lab Results   Component Value Date    CHOLESTEROL 164 07/16/2018    CHOLESTEROL 188 06/24/2017     No results found for: HDL  No results found for: LDLCALC  No results found for: TRIG  No results found for: CHOLHDL  Lab Results   Component Value Date    [id number] 0 205 (L) 03/12/2020           Objective:      /64 (BP Location: Left arm, Patient Position: Sitting, Cuff Size: Standard)   Pulse 101   Temp 97 8 °F (36 6 °C) (Tympanic)   Ht 5' 8" (1 727 m)   Wt 66 7 kg (147 lb)   SpO2 99%   BMI 22 35 kg/m²          Physical Exam   Constitutional: He is oriented to person, place, and time  No distress  HENT:   Head: Normocephalic and atraumatic  Eyes: Conjunctivae are normal    Neck: Neck supple  Cardiovascular: Normal rate and regular rhythm  Pulmonary/Chest: Effort normal and breath sounds normal    Abdominal: Soft  Musculoskeletal: He exhibits no edema  Ambulation requires walker, patient not able to stand from seated position without assistance  Neurological: He is alert and oriented to person, place, and time  No cranial nerve deficit  Skin: Skin is warm  No rash noted  Psychiatric: He has a normal mood and affect   His behavior is normal

## 2020-03-23 NOTE — ASSESSMENT & PLAN NOTE
Continue monitor patient's blood pressures medications are appropriate  No adjustments necessary today

## 2020-03-30 ENCOUNTER — TELEPHONE (OUTPATIENT)
Dept: HEMATOLOGY ONCOLOGY | Facility: CLINIC | Age: 80
End: 2020-03-30

## 2020-03-30 ENCOUNTER — PATIENT OUTREACH (OUTPATIENT)
Dept: CASE MANAGEMENT | Facility: OTHER | Age: 80
End: 2020-03-30

## 2020-03-30 NOTE — TELEPHONE ENCOUNTER
The patient was called to complete the blood work prior to the appointment on 3/31/2020  The patient was also to prescreened for the COVID-19 symptoms, also to inform about the visitor limitation  the patient denied the symptoms

## 2020-03-31 ENCOUNTER — APPOINTMENT (OUTPATIENT)
Dept: LAB | Facility: CLINIC | Age: 80
End: 2020-03-31
Payer: COMMERCIAL

## 2020-03-31 ENCOUNTER — OFFICE VISIT (OUTPATIENT)
Dept: HEMATOLOGY ONCOLOGY | Facility: CLINIC | Age: 80
End: 2020-03-31
Payer: COMMERCIAL

## 2020-03-31 VITALS
HEART RATE: 91 BPM | RESPIRATION RATE: 18 BRPM | TEMPERATURE: 96.7 F | OXYGEN SATURATION: 99 % | SYSTOLIC BLOOD PRESSURE: 126 MMHG | DIASTOLIC BLOOD PRESSURE: 72 MMHG | BODY MASS INDEX: 22.2 KG/M2 | HEIGHT: 68 IN | WEIGHT: 146.5 LBS

## 2020-03-31 DIAGNOSIS — C7B.8 NEUROENDOCRINE CARCINOMA METASTATIC TO LIVER (HCC): ICD-10-CM

## 2020-03-31 DIAGNOSIS — E21.0 HYPERPARATHYROID BONE DISEASE (HCC): ICD-10-CM

## 2020-03-31 DIAGNOSIS — C7A.8 NEUROENDOCRINE CARCINOMA METASTATIC TO LIVER (HCC): ICD-10-CM

## 2020-03-31 DIAGNOSIS — D64.9 ANEMIA, UNSPECIFIED TYPE: ICD-10-CM

## 2020-03-31 DIAGNOSIS — N18.4 CKD (CHRONIC KIDNEY DISEASE) STAGE 4, GFR 15-29 ML/MIN (HCC): ICD-10-CM

## 2020-03-31 DIAGNOSIS — N25.81 SECONDARY HYPERPARATHYROIDISM OF RENAL ORIGIN (HCC): ICD-10-CM

## 2020-03-31 DIAGNOSIS — D3A.8 NEUROENDOCRINE TUMOR: Chronic | ICD-10-CM

## 2020-03-31 DIAGNOSIS — C7B.8 NEUROENDOCRINE CARCINOMA METASTATIC TO LIVER (HCC): Primary | Chronic | ICD-10-CM

## 2020-03-31 DIAGNOSIS — Z16.12 INFECTION DUE TO ESBL-PRODUCING ESCHERICHIA COLI: ICD-10-CM

## 2020-03-31 DIAGNOSIS — E83.42 HYPOMAGNESEMIA: Chronic | ICD-10-CM

## 2020-03-31 DIAGNOSIS — E87.6 ACUTE HYPOKALEMIA: Chronic | ICD-10-CM

## 2020-03-31 DIAGNOSIS — C7A.8 NEUROENDOCRINE CARCINOMA METASTATIC TO LIVER (HCC): Primary | Chronic | ICD-10-CM

## 2020-03-31 DIAGNOSIS — D63.1 ANEMIA DUE TO STAGE 4 CHRONIC KIDNEY DISEASE (HCC): ICD-10-CM

## 2020-03-31 DIAGNOSIS — N17.9 AKI (ACUTE KIDNEY INJURY) (HCC): ICD-10-CM

## 2020-03-31 DIAGNOSIS — E34.0 CARCINOID SYNDROME (HCC): ICD-10-CM

## 2020-03-31 DIAGNOSIS — N18.4 STAGE 4 CHRONIC KIDNEY DISEASE (HCC): Chronic | ICD-10-CM

## 2020-03-31 DIAGNOSIS — A49.8 INFECTION DUE TO ESBL-PRODUCING ESCHERICHIA COLI: ICD-10-CM

## 2020-03-31 DIAGNOSIS — N18.4 ANEMIA DUE TO STAGE 4 CHRONIC KIDNEY DISEASE (HCC): ICD-10-CM

## 2020-03-31 LAB
ALBUMIN SERPL BCP-MCNC: 3.5 G/DL (ref 3.5–5)
ALP SERPL-CCNC: 180 U/L (ref 46–116)
ALT SERPL W P-5'-P-CCNC: 45 U/L (ref 12–78)
ANION GAP SERPL CALCULATED.3IONS-SCNC: 8 MMOL/L (ref 4–13)
AST SERPL W P-5'-P-CCNC: 31 U/L (ref 5–45)
BACTERIA UR QL AUTO: ABNORMAL /HPF
BASOPHILS # BLD AUTO: 0.12 THOUSANDS/ΜL (ref 0–0.1)
BASOPHILS NFR BLD AUTO: 1 % (ref 0–1)
BILIRUB SERPL-MCNC: 0.44 MG/DL (ref 0.2–1)
BILIRUB UR QL STRIP: NEGATIVE
BUN SERPL-MCNC: 32 MG/DL (ref 5–25)
CALCIUM SERPL-MCNC: 9.2 MG/DL (ref 8.3–10.1)
CHLORIDE SERPL-SCNC: 110 MMOL/L (ref 100–108)
CLARITY UR: ABNORMAL
CO2 SERPL-SCNC: 22 MMOL/L (ref 21–32)
COLOR UR: YELLOW
CREAT SERPL-MCNC: 2.5 MG/DL (ref 0.6–1.3)
CREAT UR-MCNC: 63.2 MG/DL
CREAT UR-MCNC: 63.2 MG/DL
CRP SERPL QL: 4.7 MG/L
EOSINOPHIL # BLD AUTO: 0.14 THOUSAND/ΜL (ref 0–0.61)
EOSINOPHIL NFR BLD AUTO: 2 % (ref 0–6)
ERYTHROCYTE [DISTWIDTH] IN BLOOD BY AUTOMATED COUNT: 15.1 % (ref 11.6–15.1)
ERYTHROCYTE [SEDIMENTATION RATE] IN BLOOD: 44 MM/HOUR (ref 0–10)
FERRITIN SERPL-MCNC: 509 NG/ML (ref 8–388)
GFR SERPL CREATININE-BSD FRML MDRD: 23 ML/MIN/1.73SQ M
GLUCOSE SERPL-MCNC: 101 MG/DL (ref 65–140)
GLUCOSE UR STRIP-MCNC: NEGATIVE MG/DL
HCT VFR BLD AUTO: 35.7 % (ref 36.5–49.3)
HGB BLD-MCNC: 11.2 G/DL (ref 12–17)
HGB UR QL STRIP.AUTO: ABNORMAL
IMM GRANULOCYTES # BLD AUTO: 0.01 THOUSAND/UL (ref 0–0.2)
IMM GRANULOCYTES NFR BLD AUTO: 0 % (ref 0–2)
IRON SATN MFR SERPL: 33 %
IRON SERPL-MCNC: 110 UG/DL (ref 65–175)
KETONES UR STRIP-MCNC: NEGATIVE MG/DL
LEUKOCYTE ESTERASE UR QL STRIP: ABNORMAL
LYMPHOCYTES # BLD AUTO: 1.98 THOUSANDS/ΜL (ref 0.6–4.47)
LYMPHOCYTES NFR BLD AUTO: 21 % (ref 14–44)
MAGNESIUM SERPL-MCNC: 2.1 MG/DL (ref 1.6–2.6)
MCH RBC QN AUTO: 29.5 PG (ref 26.8–34.3)
MCHC RBC AUTO-ENTMCNC: 31.4 G/DL (ref 31.4–37.4)
MCV RBC AUTO: 94 FL (ref 82–98)
MICROALBUMIN UR-MCNC: 669 MG/L (ref 0–20)
MICROALBUMIN/CREAT 24H UR: 1059 MG/G CREATININE (ref 0–30)
MONOCYTES # BLD AUTO: 0.63 THOUSAND/ΜL (ref 0.17–1.22)
MONOCYTES NFR BLD AUTO: 7 % (ref 4–12)
NEUTROPHILS # BLD AUTO: 6.75 THOUSANDS/ΜL (ref 1.85–7.62)
NEUTS SEG NFR BLD AUTO: 69 % (ref 43–75)
NITRITE UR QL STRIP: NEGATIVE
NON-SQ EPI CELLS URNS QL MICRO: ABNORMAL /HPF
NRBC BLD AUTO-RTO: 0 /100 WBCS
PH UR STRIP.AUTO: 6 [PH]
PHOSPHATE SERPL-MCNC: 4 MG/DL (ref 2.3–4.1)
PLATELET # BLD AUTO: 219 THOUSANDS/UL (ref 149–390)
PMV BLD AUTO: 11 FL (ref 8.9–12.7)
POTASSIUM SERPL-SCNC: 4.5 MMOL/L (ref 3.5–5.3)
PROT SERPL-MCNC: 7.6 G/DL (ref 6.4–8.2)
PROT UR STRIP-MCNC: ABNORMAL MG/DL
PROT UR-MCNC: 176 MG/DL
PROT/CREAT UR: 2.78 MG/G{CREAT} (ref 0–0.1)
PTH-INTACT SERPL-MCNC: 102.4 PG/ML (ref 18.4–80.1)
RBC # BLD AUTO: 3.8 MILLION/UL (ref 3.88–5.62)
RBC #/AREA URNS AUTO: ABNORMAL /HPF
SODIUM SERPL-SCNC: 140 MMOL/L (ref 136–145)
SP GR UR STRIP.AUTO: 1.01 (ref 1–1.03)
TIBC SERPL-MCNC: 333 UG/DL (ref 250–450)
URATE SERPL-MCNC: 6.8 MG/DL (ref 4.2–8)
UROBILINOGEN UR QL STRIP.AUTO: 0.2 E.U./DL
WBC # BLD AUTO: 9.63 THOUSAND/UL (ref 4.31–10.16)
WBC #/AREA URNS AUTO: ABNORMAL /HPF

## 2020-03-31 PROCEDURE — 36415 COLL VENOUS BLD VENIPUNCTURE: CPT

## 2020-03-31 PROCEDURE — 85652 RBC SED RATE AUTOMATED: CPT

## 2020-03-31 PROCEDURE — 85025 COMPLETE CBC W/AUTO DIFF WBC: CPT

## 2020-03-31 PROCEDURE — 80053 COMPREHEN METABOLIC PANEL: CPT

## 2020-03-31 PROCEDURE — 87186 SC STD MICRODIL/AGAR DIL: CPT

## 2020-03-31 PROCEDURE — 84100 ASSAY OF PHOSPHORUS: CPT

## 2020-03-31 PROCEDURE — 87077 CULTURE AEROBIC IDENTIFY: CPT

## 2020-03-31 PROCEDURE — 99214 OFFICE O/P EST MOD 30 MIN: CPT | Performed by: INTERNAL MEDICINE

## 2020-03-31 PROCEDURE — 83970 ASSAY OF PARATHORMONE: CPT

## 2020-03-31 PROCEDURE — 87181 SC STD AGAR DILUTION PER AGT: CPT

## 2020-03-31 PROCEDURE — 86316 IMMUNOASSAY TUMOR OTHER: CPT

## 2020-03-31 PROCEDURE — 83540 ASSAY OF IRON: CPT

## 2020-03-31 PROCEDURE — 81001 URINALYSIS AUTO W/SCOPE: CPT

## 2020-03-31 PROCEDURE — 83735 ASSAY OF MAGNESIUM: CPT

## 2020-03-31 PROCEDURE — 82570 ASSAY OF URINE CREATININE: CPT

## 2020-03-31 PROCEDURE — 83550 IRON BINDING TEST: CPT

## 2020-03-31 PROCEDURE — 84550 ASSAY OF BLOOD/URIC ACID: CPT

## 2020-03-31 PROCEDURE — 82652 VIT D 1 25-DIHYDROXY: CPT

## 2020-03-31 PROCEDURE — 84156 ASSAY OF PROTEIN URINE: CPT

## 2020-03-31 PROCEDURE — 86140 C-REACTIVE PROTEIN: CPT

## 2020-03-31 PROCEDURE — 82043 UR ALBUMIN QUANTITATIVE: CPT

## 2020-03-31 PROCEDURE — 82728 ASSAY OF FERRITIN: CPT

## 2020-03-31 PROCEDURE — 87086 URINE CULTURE/COLONY COUNT: CPT

## 2020-03-31 RX ORDER — VANCOMYCIN HYDROCHLORIDE 125 MG/1
CAPSULE ORAL
Status: ON HOLD | COMMUNITY
Start: 2020-03-08 | End: 2020-01-01 | Stop reason: ALTCHOICE

## 2020-03-31 NOTE — PROGRESS NOTES
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 [facility])  The patient has less diarrhea at this point in time  The diarrhea could be due to the fact that he had a C diff the infection which is being treated with oral vancomycin  We will continue him on the lanreotide injections on every 4 week basis to prevent carcinoid related diarrhea  We will continue to monitor his chromogranin A serum level  - CBC and differential; Future  - Comprehensive metabolic panel; Future  - Magnesium; Future  - Chromogranin A; Future    2  Carcinoid syndrome ([address] )  As above  - CBC and differential; Future  - Comprehensive metabolic panel; Future  - Magnesium; Future  - Chromogranin A; Future        HPI:  Patient came today for a follow-up visit  He stated that he was recently hospitalized after he had syncope after he got the lanreotide injection  The patient then stayed in the hospital for about 9 days  He was found to have urine tract infection/sepsis which was treated  He also getting treatment for C diff colitis with the oral vancomycin  The wife stated that his diarrhea is getting better  He denies any abdominal pain at this point  His blood work from today is still pending  His most recent available blood work from 03/19/2020 showed hemoglobin of 8 7 with normal white cells and platelets  His creatinine was 2 26  The patient denied fever    Oncology History    Patient has multiple comorbid conditions including history of coronary artery disease, diabetes mellitus, hypertension, hyperlipidemia, and more recently metastatic neuroendocrine tumor       The patient was seen in consultation when he was in the hospital on the 5th February 2019 at that time he had significant weakness status post vasovagal syncope   He was evaluated with a CT scan of the abdomen and pelvis on the 23rd January 2019 which showed partially calcified mesenteric mass with multiple liver lesions compatible most likely with carcinoid malignancy   He also was found to have bilateral hydronephrosis and severe bladder wall thickening with prostatomegaly      The patient then had a core biopsy from 1 of the liver lesions on the 24th of January which showed well-differentiated neuroendocrine tumor grade 1-2 with Ki 67 of 3-5%      His chromogranin level  February 2019 was 273   His 24 hour urine for HIAA was 48 which is above normal   The patient was started on Lanreotide in March 2019            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 ([facility] )    1/24/2019 Initial Diagnosis     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 ([facility] )      1/24/2019 Biopsy     A  Liver mass, core biopsy:  - Well differentiated neuroendocrine tumor, G2         3/2019 -  Chemotherapy     Lanreotide 120 mg every 4 weeks injections          Interval history:    ROS: Review of Systems   Constitutional: Positive for fatigue  Negative for appetite change, diaphoresis and fever  HENT: Positive for hearing loss  Negative for congestion, dental problem, facial swelling, tinnitus and trouble swallowing  Eyes: Negative for visual disturbance  Respiratory: Positive for shortness of breath  Negative for cough, chest tightness and wheezing  Cardiovascular: Negative for chest pain and leg swelling  Gastrointestinal: Positive for diarrhea  Negative for abdominal distention, abdominal pain, blood in stool, constipation, nausea and vomiting  Genitourinary: Negative for dysuria, hematuria and urgency  Musculoskeletal: Negative for arthralgias, myalgias and neck pain  Skin: Negative  Negative for color change, pallor, rash and wound  Neurological: Positive for dizziness and numbness  Negative for weakness and headaches  Hematological: Negative for adenopathy     Psychiatric/Behavioral: Negative for agitation, behavioral problems, confusion, hallucinations, self-injury and sleep disturbance  The patient is not nervous/anxious and is not hyperactive  Past Medical History:   Diagnosis Date    Acute pancreatitis without infection or necrosis 9/26/2019    Anemia of chronic renal failure     BPH (benign prostatic hyperplasia)     Cancer (HCC)     liver cancer    Cardiac disease     Chronic kidney disease, stage 3 (HCC)     Coronary artery disease     Diabetes mellitus ([facility] )     DJD (degenerative joint disease)     Essential hypertension     Gait disturbance     Generalized weakness     GERD (gastroesophageal reflux disease)     Gout     History of transfusion     Hydronephrosis     Hyperlipidemia     Hypertension     Liver cancer ([facility] )     Pressure injury of skin     Proteinuria     Renal disorder     Retention of urine     Thrombophlebitis migrans     Type 2 diabetes mellitus ([facility] )     Type 2 diabetes 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, with long-term current use of insulin ([facility] ) 1/23/2019       Past Surgical History:   Procedure Laterality Date    CHOLECYSTECTOMY      CHOLECYSTECTOMY LAPAROSCOPIC N/A 2/7/2020    Procedure: CHOLECYSTECTOMY LAPAROSCOPIC;  Surgeon: Rom Chahal MD;  Location: [facility] MAIN OR;  Service: General    CORONARY ANGIOPLASTY WITH STENT PLACEMENT      IR IMAGE GUIDED BIOPSY/ASPIRATION LIVER  1/24/2019    IR TUBE PLACEMENT ROQUE  9/6/2019       Social History     Socioeconomic History    Marital status:       Spouse name: None    Number of children: None    Years of education: None    Highest education level: None   Occupational History    None   Social Needs    Financial resource strain: None    Food insecurity:     Worry: None     Inability: None    Transportation needs:     Medical: None     Non-medical: None   Tobacco Use    Smoking status: Never Smoker    Smokeless tobacco: Never Used   Substance and Sexual Activity    Alcohol use: Not Currently     Alcohol/week: 0 0 standard drinks     Frequency: Never    Drug use: Never    Sexual activity: Not Currently   Lifestyle    Physical activity:     Days per week: None     Minutes per session: None    Stress: None   Relationships    Social connections:     Talks on phone: None     Gets together: None     Attends [employer] service: None     Active member of club or organization: None     Attends meetings of clubs or organizations: None     Relationship status: None    Intimate partner violence:     Fear of current or ex partner: None     Emotionally abused: None     Physically abused: None     Forced sexual activity: None   Other Topics Concern    None   Social History Narrative    None       Family History   Problem Relation Age of Onset    Cancer Mother     Hypertension Father     Cancer Brother     Cancer Maternal Grandmother     Cancer Paternal Aunt        No Known Allergies      Current Outpatient Medications:     acetaminophen (TYLENOL) 325 mg tablet, Take 650 mg by mouth every 6 (six) hours as needed , Disp: , Rfl:     amLODIPine (NORVASC) 10 mg tablet, Take 10 mg by mouth daily, Disp: , Rfl:     aspirin 81 MG tablet, Take 81 mg by mouth daily, Disp: , Rfl:     b complex-vitamin C-folic acid (NEPHROCAPS) 1 mg capsule, Take 1 capsule by mouth daily with dinner, Disp: 60 capsule, Rfl: 0    BD INSULIN SYRINGE U/F 30G X 1/2" 0 3 ML MISC, , Disp: , Rfl:     cholecalciferol (VITAMIN D3) 1,000 units tablet, Take 1,000 Units by mouth daily, Disp: , Rfl:     colchicine (COLCRYS) 0 6 mg tablet, Take 0 5 tablets (0 3 mg total) by mouth daily (Patient taking differently: Take 0 6 mg by mouth daily ), Disp: 15 tablet, Rfl: 2    insulin detemir (LEVEMIR) 100 units/mL subcutaneous injection, Inject 10 Units under the skin daily before breakfast, Disp: , Rfl:     insulin lispro (HumaLOG) 100 units/mL injection, Inject under the skin 3 (three) times a day before meals, Disp: , Rfl:     Insulin Syringe-Needle U-100 30G X 1/2" 1 ML MISC, by Does not apply route 3 (three) times a day, Disp: 100 each, Rfl: 5    magnesium oxide (MAG-OX) 400 mg, Take 1 tablet (400 mg total) by mouth 2 (two) times a day, Disp: 60 tablet, Rfl: 0    methenamine hippurate (HIPREX) 1 g tablet, Take 1 tablet (1 g total) by mouth 3 (three) times a day, Disp: 90 tablet, Rfl: 5    oxyCODONE-acetaminophen (PERCOCET) 5-325 mg per tablet, Take 1 tablet by mouth every 4 (four) hours as needed for moderate pain, Disp: , Rfl:     pantoprazole (PROTONIX) 40 mg tablet, Take 1 tablet (40 mg total) by mouth daily in the early morning, Disp: , Rfl: 0    potassium chloride (K-DUR,KLOR-CON) 20 mEq tablet, Take 1 tablet (20 mEq total) by mouth 3 (three) times a week, Disp: 12 tablet, Rfl: 0    simethicone (MYLICON) 80 mg chewable tablet, Chew 1 tablet (80 mg total) every 6 (six) hours as needed for flatulence, Disp: 90 tablet, Rfl: 5    tamsulosin (FLOMAX) 0 4 mg, Take 1 capsule (0 4 mg total) by mouth daily with dinner, Disp: , Rfl: 0    vancomycin (VANCOCIN) 125 MG capsule, , Disp: , Rfl:       Physical Exam:  /72 (BP Location: Left arm, Cuff Size: Adult)   Pulse 91   Temp (!) 96 7 °F (35 9 °C) (Tympanic)   Resp 18   Ht 5' 8" (1 727 m)   Wt 66 5 kg (146 lb 8 oz)   SpO2 99%   BMI 22 28 kg/m²     Physical Exam   Constitutional: He is oriented to person, place, and time  He appears well-developed and well-nourished  HENT:   Head: Normocephalic and atraumatic  Nose: Nose normal    Mouth/Throat: Oropharynx is clear and moist    Eyes: Pupils are equal, round, and reactive to light  Conjunctivae and EOM are normal  Right eye exhibits no discharge  Left eye exhibits no discharge  No scleral icterus  Neck: Normal range of motion  Neck supple  No tracheal deviation present  No thyromegaly present  Cardiovascular: Normal rate, regular rhythm and normal heart sounds  Exam reveals no friction rub  No murmur heard    Pulmonary/Chest: Effort normal and breath sounds normal  No respiratory distress  He has no wheezes  He has no rales  He exhibits no tenderness  Abdominal: Soft  Bowel sounds are normal  He exhibits no distension and no mass  There is no hepatosplenomegaly, splenomegaly or hepatomegaly  There is no tenderness  There is no rebound and no guarding  Musculoskeletal: Normal range of motion  He exhibits no edema, tenderness or deformity  Lymphadenopathy:     He has no cervical adenopathy  Neurological: He is alert and oriented to person, place, and time  He has normal reflexes  He displays normal reflexes  No cranial nerve deficit  Coordination normal    Skin: Skin is warm and dry  No rash noted  No erythema  No pallor  Psychiatric: He has a normal mood and affect  His behavior is normal  Judgment and thought content normal          Labs:  Lab Results   Component Value Date    WBC 7 26 03/19/2020    HGB 8 7 (L) 03/19/2020    HCT 27 1 (L) 03/19/2020    MCV 95 03/19/2020     03/19/2020     Lab Results   Component Value Date    K 4 5 03/19/2020     03/19/2020    CO2 25 03/19/2020    BUN 35 (H) 03/19/2020    CREATININE 2 26 (H) 03/19/2020    GLUF 116 (H) 02/24/2020    CALCIUM 8 5 03/19/2020    AST 14 03/17/2020    ALT 23 03/17/2020    ALKPHOS 152 (H) 03/17/2020    EGFR 26 03/19/2020     No results found for: TSH    Patient voiced understanding and agreement in the above discussion  Aware to contact our office with questions/symptoms in the interim

## 2020-04-01 DIAGNOSIS — R11.2 NAUSEA AND VOMITING: ICD-10-CM

## 2020-04-01 RX ORDER — PANTOPRAZOLE SODIUM 40 MG/1
40 TABLET, DELAYED RELEASE ORAL
Qty: 90 TABLET | Refills: 3 | Status: ON HOLD | OUTPATIENT
Start: 2020-04-01 | End: 2020-01-01 | Stop reason: SDUPTHER

## 2020-04-02 ENCOUNTER — TELEPHONE (OUTPATIENT)
Dept: NEPHROLOGY | Facility: CLINIC | Age: 80
End: 2020-04-02

## 2020-04-02 LAB — 1,25(OH)2D3 SERPL-MCNC: 13.8 PG/ML (ref 19.9–79.3)

## 2020-04-03 LAB — BACTERIA UR CULT: ABNORMAL

## 2020-04-08 ENCOUNTER — HOSPITAL ENCOUNTER (OUTPATIENT)
Dept: INFUSION CENTER | Facility: HOSPITAL | Age: 80
Discharge: HOME | End: 2020-04-08
Payer: COMMERCIAL

## 2020-04-08 VITALS
TEMPERATURE: 96.8 F | RESPIRATION RATE: 18 BRPM | HEART RATE: 84 BPM | DIASTOLIC BLOOD PRESSURE: 68 MMHG | SYSTOLIC BLOOD PRESSURE: 138 MMHG

## 2020-04-08 DIAGNOSIS — C7A.8 MALIGNANT NEUROENDOCRINE NEOPLASM (HCC): ICD-10-CM

## 2020-04-08 DIAGNOSIS — C7B.8 NEUROENDOCRINE CARCINOMA METASTATIC TO LIVER (HCC): ICD-10-CM

## 2020-04-08 DIAGNOSIS — E34.0 CARCINOID SYNDROME (HCC): Primary | ICD-10-CM

## 2020-04-08 DIAGNOSIS — C7A.8 NEUROENDOCRINE CARCINOMA METASTATIC TO LIVER (HCC): ICD-10-CM

## 2020-04-08 DIAGNOSIS — D3A.8 NEUROENDOCRINE TUMOR: ICD-10-CM

## 2020-04-08 PROCEDURE — 96372 THER/PROPH/DIAG INJ SC/IM: CPT

## 2020-04-08 RX ORDER — LANREOTIDE ACETATE 120 MG/.5ML
120 INJECTION SUBCUTANEOUS ONCE
Status: COMPLETED | OUTPATIENT
Start: 2020-04-08 | End: 2020-04-08

## 2020-04-08 RX ORDER — LANREOTIDE ACETATE 120 MG/.5ML
120 INJECTION SUBCUTANEOUS ONCE
Status: CANCELLED | OUTPATIENT
Start: 2020-05-06

## 2020-04-08 RX ADMIN — LANREOTIDE ACETATE 120 MG: 120 INJECTION SUBCUTANEOUS at 13:57

## 2020-04-09 DIAGNOSIS — N18.30 ACUTE RENAL FAILURE WITH OTHER SPECIFIED PATHOLOGICAL KIDNEY LESION SUPERIMPOSED ON STAGE 3 CHRONIC KIDNEY DISEASE: Chronic | ICD-10-CM

## 2020-04-09 DIAGNOSIS — N17.8 ACUTE RENAL FAILURE WITH OTHER SPECIFIED PATHOLOGICAL KIDNEY LESION SUPERIMPOSED ON STAGE 3 CHRONIC KIDNEY DISEASE: Chronic | ICD-10-CM

## 2020-04-09 DIAGNOSIS — A04.72 CLOSTRIDIUM DIFFICILE DIARRHEA: Chronic | ICD-10-CM

## 2020-04-09 DIAGNOSIS — I10 ESSENTIAL HYPERTENSION: Primary | ICD-10-CM

## 2020-04-09 RX ORDER — AMLODIPINE BESYLATE 10 MG/1
10 TABLET ORAL DAILY
Qty: 90 TABLET | Refills: 3 | Status: SHIPPED | OUTPATIENT
Start: 2020-04-09 | End: 2020-01-01

## 2020-04-09 RX ORDER — POTASSIUM CHLORIDE 20 MEQ/1
20 TABLET, EXTENDED RELEASE ORAL 3 TIMES WEEKLY
Qty: 39 TABLET | Refills: 3 | Status: SHIPPED | OUTPATIENT
Start: 2020-04-10 | End: 2020-01-01 | Stop reason: HOSPADM

## 2020-04-09 RX ORDER — TAMSULOSIN HYDROCHLORIDE 0.4 MG/1
0.4 CAPSULE ORAL
Qty: 90 CAPSULE | Refills: 3 | Status: SHIPPED | OUTPATIENT
Start: 2020-04-09 | End: 2020-01-01 | Stop reason: SDUPTHER

## 2020-04-16 DIAGNOSIS — E87.6 ACUTE HYPOKALEMIA: Chronic | ICD-10-CM

## 2020-04-20 ENCOUNTER — APPOINTMENT (OUTPATIENT)
Dept: LAB | Facility: CLINIC | Age: 80
End: 2020-04-20
Payer: COMMERCIAL

## 2020-04-20 ENCOUNTER — TRANSCRIBE ORDERS (OUTPATIENT)
Dept: ADMINISTRATIVE | Facility: HOSPITAL | Age: 80
End: 2020-04-20

## 2020-04-20 DIAGNOSIS — C7A.8 NEUROENDOCRINE CARCINOMA METASTATIC TO LIVER (HCC): Chronic | ICD-10-CM

## 2020-04-20 DIAGNOSIS — C7B.8 NEUROENDOCRINE CARCINOMA METASTATIC TO LIVER (HCC): Primary | ICD-10-CM

## 2020-04-20 DIAGNOSIS — E34.0 CARCINOID SYNDROME (HCC): ICD-10-CM

## 2020-04-20 DIAGNOSIS — C7A.8 NEUROENDOCRINE CARCINOMA METASTATIC TO LIVER (HCC): Primary | ICD-10-CM

## 2020-04-20 DIAGNOSIS — C7B.8 NEUROENDOCRINE CARCINOMA METASTATIC TO LIVER (HCC): Chronic | ICD-10-CM

## 2020-04-20 LAB
ALBUMIN SERPL BCP-MCNC: 3.2 G/DL (ref 3.5–5)
ALP SERPL-CCNC: 136 U/L (ref 46–116)
ALT SERPL W P-5'-P-CCNC: 35 U/L (ref 12–78)
ANION GAP SERPL CALCULATED.3IONS-SCNC: 7 MMOL/L (ref 4–13)
AST SERPL W P-5'-P-CCNC: 17 U/L (ref 5–45)
BASOPHILS # BLD AUTO: 0.1 THOUSANDS/ΜL (ref 0–0.1)
BASOPHILS NFR BLD AUTO: 1 % (ref 0–1)
BILIRUB SERPL-MCNC: 0.43 MG/DL (ref 0.2–1)
BUN SERPL-MCNC: 37 MG/DL (ref 5–25)
CALCIUM SERPL-MCNC: 8.7 MG/DL (ref 8.3–10.1)
CHLORIDE SERPL-SCNC: 113 MMOL/L (ref 100–108)
CO2 SERPL-SCNC: 24 MMOL/L (ref 21–32)
CREAT SERPL-MCNC: 2.56 MG/DL (ref 0.6–1.3)
CRP SERPL QL: 5.1 MG/L
EOSINOPHIL # BLD AUTO: 0.21 THOUSAND/ΜL (ref 0–0.61)
EOSINOPHIL NFR BLD AUTO: 3 % (ref 0–6)
ERYTHROCYTE [DISTWIDTH] IN BLOOD BY AUTOMATED COUNT: 15.3 % (ref 11.6–15.1)
ERYTHROCYTE [SEDIMENTATION RATE] IN BLOOD: 50 MM/HOUR (ref 0–10)
GFR SERPL CREATININE-BSD FRML MDRD: 23 ML/MIN/1.73SQ M
GLUCOSE P FAST SERPL-MCNC: 83 MG/DL (ref 65–99)
HCT VFR BLD AUTO: 35.1 % (ref 36.5–49.3)
HGB BLD-MCNC: 11.3 G/DL (ref 12–17)
IMM GRANULOCYTES # BLD AUTO: 0.01 THOUSAND/UL (ref 0–0.2)
IMM GRANULOCYTES NFR BLD AUTO: 0 % (ref 0–2)
LYMPHOCYTES # BLD AUTO: 1.63 THOUSANDS/ΜL (ref 0.6–4.47)
LYMPHOCYTES NFR BLD AUTO: 21 % (ref 14–44)
MAGNESIUM SERPL-MCNC: 2.1 MG/DL (ref 1.6–2.6)
MCH RBC QN AUTO: 30.1 PG (ref 26.8–34.3)
MCHC RBC AUTO-ENTMCNC: 32.2 G/DL (ref 31.4–37.4)
MCV RBC AUTO: 93 FL (ref 82–98)
MONOCYTES # BLD AUTO: 0.57 THOUSAND/ΜL (ref 0.17–1.22)
MONOCYTES NFR BLD AUTO: 7 % (ref 4–12)
NEUTROPHILS # BLD AUTO: 5.39 THOUSANDS/ΜL (ref 1.85–7.62)
NEUTS SEG NFR BLD AUTO: 68 % (ref 43–75)
NRBC BLD AUTO-RTO: 0 /100 WBCS
PLATELET # BLD AUTO: 161 THOUSANDS/UL (ref 149–390)
PMV BLD AUTO: 11.4 FL (ref 8.9–12.7)
POTASSIUM SERPL-SCNC: 4.6 MMOL/L (ref 3.5–5.3)
PROT SERPL-MCNC: 7 G/DL (ref 6.4–8.2)
RBC # BLD AUTO: 3.76 MILLION/UL (ref 3.88–5.62)
SODIUM SERPL-SCNC: 144 MMOL/L (ref 136–145)
WBC # BLD AUTO: 7.91 THOUSAND/UL (ref 4.31–10.16)

## 2020-04-20 PROCEDURE — 80053 COMPREHEN METABOLIC PANEL: CPT

## 2020-04-20 PROCEDURE — 86316 IMMUNOASSAY TUMOR OTHER: CPT

## 2020-04-20 PROCEDURE — 83735 ASSAY OF MAGNESIUM: CPT

## 2020-04-20 PROCEDURE — 85652 RBC SED RATE AUTOMATED: CPT

## 2020-04-20 PROCEDURE — 86140 C-REACTIVE PROTEIN: CPT

## 2020-04-20 PROCEDURE — 36415 COLL VENOUS BLD VENIPUNCTURE: CPT

## 2020-04-20 PROCEDURE — 85025 COMPLETE CBC W/AUTO DIFF WBC: CPT

## 2020-04-21 ENCOUNTER — HOSPITAL ENCOUNTER (INPATIENT)
Facility: HOSPITAL | Age: 80
LOS: 6 days | Discharge: HOME HEALTH | DRG: 660 | End: 2020-04-27
Attending: INTERNAL MEDICINE | Admitting: INTERNAL MEDICINE
Payer: COMMERCIAL

## 2020-04-21 ENCOUNTER — ANESTHESIA (INPATIENT)
Dept: PERIOP | Facility: HOSPITAL | Age: 80
DRG: 660 | End: 2020-04-21
Payer: COMMERCIAL

## 2020-04-21 ENCOUNTER — TELEPHONE (OUTPATIENT)
Dept: NEPHROLOGY | Facility: CLINIC | Age: 80
End: 2020-04-21

## 2020-04-21 ENCOUNTER — ANESTHESIA EVENT (INPATIENT)
Dept: PERIOP | Facility: HOSPITAL | Age: 80
DRG: 660 | End: 2020-04-21
Payer: COMMERCIAL

## 2020-04-21 ENCOUNTER — APPOINTMENT (EMERGENCY)
Dept: CT IMAGING | Facility: HOSPITAL | Age: 80
End: 2020-04-21
Payer: COMMERCIAL

## 2020-04-21 ENCOUNTER — HOSPITAL ENCOUNTER (EMERGENCY)
Facility: HOSPITAL | Age: 80
End: 2020-04-21
Attending: EMERGENCY MEDICINE
Payer: COMMERCIAL

## 2020-04-21 ENCOUNTER — APPOINTMENT (INPATIENT)
Dept: RADIOLOGY | Facility: HOSPITAL | Age: 80
DRG: 660 | End: 2020-04-21
Payer: COMMERCIAL

## 2020-04-21 ENCOUNTER — APPOINTMENT (EMERGENCY)
Dept: RADIOLOGY | Facility: HOSPITAL | Age: 80
End: 2020-04-21
Payer: COMMERCIAL

## 2020-04-21 VITALS
OXYGEN SATURATION: 98 % | SYSTOLIC BLOOD PRESSURE: 133 MMHG | RESPIRATION RATE: 17 BRPM | BODY MASS INDEX: 24.16 KG/M2 | WEIGHT: 159.39 LBS | TEMPERATURE: 97.5 F | HEIGHT: 68 IN | HEART RATE: 103 BPM | DIASTOLIC BLOOD PRESSURE: 70 MMHG

## 2020-04-21 DIAGNOSIS — R78.81 BACTEREMIA DUE TO GRAM-NEGATIVE BACTERIA: ICD-10-CM

## 2020-04-21 DIAGNOSIS — Z22.39 ESBL ESCHERICHIA COLI CARRIER: Primary | Chronic | ICD-10-CM

## 2020-04-21 DIAGNOSIS — D3A.098 CARCINOID TUMOR OF ABDOMEN: ICD-10-CM

## 2020-04-21 DIAGNOSIS — R79.89 ELEVATED D-DIMER: ICD-10-CM

## 2020-04-21 DIAGNOSIS — R79.89 ELEVATED BRAIN NATRIURETIC PEPTIDE (BNP) LEVEL: ICD-10-CM

## 2020-04-21 DIAGNOSIS — N39.0 RECURRENT UTI: ICD-10-CM

## 2020-04-21 DIAGNOSIS — N39.0 UTI (URINARY TRACT INFECTION): ICD-10-CM

## 2020-04-21 DIAGNOSIS — R10.9 ABDOMINAL PAIN: ICD-10-CM

## 2020-04-21 DIAGNOSIS — N18.4 ACUTE RENAL FAILURE SUPERIMPOSED ON STAGE 4 CHRONIC KIDNEY DISEASE (HCC): ICD-10-CM

## 2020-04-21 DIAGNOSIS — N17.9 ACUTE RENAL FAILURE SUPERIMPOSED ON STAGE 4 CHRONIC KIDNEY DISEASE (HCC): ICD-10-CM

## 2020-04-21 DIAGNOSIS — R11.0 NAUSEA: ICD-10-CM

## 2020-04-21 DIAGNOSIS — C78.7 LIVER METASTASIS (HCC): ICD-10-CM

## 2020-04-21 DIAGNOSIS — N13.2 URETERAL STONE WITH HYDRONEPHROSIS: Primary | ICD-10-CM

## 2020-04-21 DIAGNOSIS — R11.2 NAUSEA AND VOMITING: ICD-10-CM

## 2020-04-21 DIAGNOSIS — N19 RENAL FAILURE: ICD-10-CM

## 2020-04-21 LAB
ALBUMIN SERPL BCP-MCNC: 3.6 G/DL (ref 3.5–5)
ALP SERPL-CCNC: 149 U/L (ref 46–116)
ALT SERPL W P-5'-P-CCNC: 41 U/L (ref 12–78)
ANION GAP SERPL CALCULATED.3IONS-SCNC: 10 MMOL/L (ref 4–13)
AST SERPL W P-5'-P-CCNC: 25 U/L (ref 5–45)
BACTERIA UR QL AUTO: ABNORMAL /HPF
BASOPHILS # BLD AUTO: 0.06 THOUSANDS/ΜL (ref 0–0.1)
BASOPHILS NFR BLD AUTO: 1 % (ref 0–1)
BILIRUB DIRECT SERPL-MCNC: 0.12 MG/DL (ref 0–0.2)
BILIRUB SERPL-MCNC: 0.5 MG/DL (ref 0.2–1)
BILIRUB UR QL STRIP: ABNORMAL
BUN SERPL-MCNC: 39 MG/DL (ref 5–25)
CALCIUM SERPL-MCNC: 8.8 MG/DL (ref 8.3–10.1)
CGA SERPL-MCNC: 2083 NG/ML (ref 0–101.8)
CHLORIDE SERPL-SCNC: 101 MMOL/L (ref 100–108)
CLARITY UR: ABNORMAL
CO2 SERPL-SCNC: 25 MMOL/L (ref 21–32)
COLOR UR: YELLOW
CREAT SERPL-MCNC: 2.56 MG/DL (ref 0.6–1.3)
D DIMER PPP FEU-MCNC: 1.38 UG/ML FEU
EOSINOPHIL # BLD AUTO: 0.12 THOUSAND/ΜL (ref 0–0.61)
EOSINOPHIL NFR BLD AUTO: 2 % (ref 0–6)
ERYTHROCYTE [DISTWIDTH] IN BLOOD BY AUTOMATED COUNT: 15.3 % (ref 11.6–15.1)
GFR SERPL CREATININE-BSD FRML MDRD: 23 ML/MIN/1.73SQ M
GLUCOSE SERPL-MCNC: 144 MG/DL (ref 65–140)
GLUCOSE SERPL-MCNC: 189 MG/DL (ref 65–140)
GLUCOSE SERPL-MCNC: 206 MG/DL (ref 65–140)
GLUCOSE UR STRIP-MCNC: ABNORMAL MG/DL
HCT VFR BLD AUTO: 38.9 % (ref 36.5–49.3)
HGB BLD-MCNC: 12.3 G/DL (ref 12–17)
HGB UR QL STRIP.AUTO: ABNORMAL
IMM GRANULOCYTES # BLD AUTO: 0.03 THOUSAND/UL (ref 0–0.2)
IMM GRANULOCYTES NFR BLD AUTO: 0 % (ref 0–2)
KETONES UR STRIP-MCNC: ABNORMAL MG/DL
LACTATE SERPL-SCNC: 1.9 MMOL/L (ref 0.5–2)
LACTATE SERPL-SCNC: 2.3 MMOL/L (ref 0.5–2)
LEUKOCYTE ESTERASE UR QL STRIP: ABNORMAL
LIPASE SERPL-CCNC: 244 U/L (ref 73–393)
LYMPHOCYTES # BLD AUTO: 1.22 THOUSANDS/ΜL (ref 0.6–4.47)
LYMPHOCYTES NFR BLD AUTO: 18 % (ref 14–44)
MAGNESIUM SERPL-MCNC: 1.9 MG/DL (ref 1.6–2.6)
MCH RBC QN AUTO: 29.6 PG (ref 26.8–34.3)
MCHC RBC AUTO-ENTMCNC: 31.6 G/DL (ref 31.4–37.4)
MCV RBC AUTO: 94 FL (ref 82–98)
MONOCYTES # BLD AUTO: 0.35 THOUSAND/ΜL (ref 0.17–1.22)
MONOCYTES NFR BLD AUTO: 5 % (ref 4–12)
NEUTROPHILS # BLD AUTO: 5.17 THOUSANDS/ΜL (ref 1.85–7.62)
NEUTS SEG NFR BLD AUTO: 74 % (ref 43–75)
NITRITE UR QL STRIP: ABNORMAL
NON-SQ EPI CELLS URNS QL MICRO: ABNORMAL /HPF
NRBC BLD AUTO-RTO: 0 /100 WBCS
NT-PROBNP SERPL-MCNC: 994 PG/ML
PH UR STRIP.AUTO: ABNORMAL [PH]
PHOSPHATE SERPL-MCNC: 3.8 MG/DL (ref 2.3–4.1)
PLATELET # BLD AUTO: 150 THOUSANDS/UL (ref 149–390)
PMV BLD AUTO: 10.9 FL (ref 8.9–12.7)
POTASSIUM SERPL-SCNC: 4.8 MMOL/L (ref 3.5–5.3)
PROT SERPL-MCNC: 7.6 G/DL (ref 6.4–8.2)
PROT UR STRIP-MCNC: ABNORMAL MG/DL
RBC # BLD AUTO: 4.15 MILLION/UL (ref 3.88–5.62)
RBC #/AREA URNS AUTO: ABNORMAL /HPF
SARS-COV-2 RNA RESP QL NAA+PROBE: NEGATIVE
SODIUM SERPL-SCNC: 136 MMOL/L (ref 136–145)
TROPONIN I SERPL-MCNC: <0.02 NG/ML
TSH SERPL DL<=0.05 MIU/L-ACNC: 1.89 UIU/ML (ref 0.36–3.74)
UROBILINOGEN UR QL STRIP.AUTO: ABNORMAL E.U./DL
WBC # BLD AUTO: 6.95 THOUSAND/UL (ref 4.31–10.16)
WBC #/AREA URNS AUTO: ABNORMAL /HPF

## 2020-04-21 PROCEDURE — 96361 HYDRATE IV INFUSION ADD-ON: CPT

## 2020-04-21 PROCEDURE — 0T778DZ DILATION OF LEFT URETER WITH INTRALUMINAL DEVICE, VIA NATURAL OR ARTIFICIAL OPENING ENDOSCOPIC: ICD-10-PCS | Performed by: UROLOGY

## 2020-04-21 PROCEDURE — 83735 ASSAY OF MAGNESIUM: CPT | Performed by: EMERGENCY MEDICINE

## 2020-04-21 PROCEDURE — 71250 CT THORAX DX C-: CPT

## 2020-04-21 PROCEDURE — 80048 BASIC METABOLIC PNL TOTAL CA: CPT | Performed by: EMERGENCY MEDICINE

## 2020-04-21 PROCEDURE — 87077 CULTURE AEROBIC IDENTIFY: CPT | Performed by: EMERGENCY MEDICINE

## 2020-04-21 PROCEDURE — 52332 CYSTOSCOPY AND TREATMENT: CPT | Performed by: UROLOGY

## 2020-04-21 PROCEDURE — 96376 TX/PRO/DX INJ SAME DRUG ADON: CPT

## 2020-04-21 PROCEDURE — 71045 X-RAY EXAM CHEST 1 VIEW: CPT

## 2020-04-21 PROCEDURE — 85025 COMPLETE CBC W/AUTO DIFF WBC: CPT | Performed by: EMERGENCY MEDICINE

## 2020-04-21 PROCEDURE — 81001 URINALYSIS AUTO W/SCOPE: CPT | Performed by: EMERGENCY MEDICINE

## 2020-04-21 PROCEDURE — 74018 RADEX ABDOMEN 1 VIEW: CPT

## 2020-04-21 PROCEDURE — 87040 BLOOD CULTURE FOR BACTERIA: CPT | Performed by: EMERGENCY MEDICINE

## 2020-04-21 PROCEDURE — 87635 SARS-COV-2 COVID-19 AMP PRB: CPT | Performed by: EMERGENCY MEDICINE

## 2020-04-21 PROCEDURE — 99285 EMERGENCY DEPT VISIT HI MDM: CPT | Performed by: EMERGENCY MEDICINE

## 2020-04-21 PROCEDURE — 87086 URINE CULTURE/COLONY COUNT: CPT | Performed by: EMERGENCY MEDICINE

## 2020-04-21 PROCEDURE — 83605 ASSAY OF LACTIC ACID: CPT | Performed by: EMERGENCY MEDICINE

## 2020-04-21 PROCEDURE — 99285 EMERGENCY DEPT VISIT HI MDM: CPT

## 2020-04-21 PROCEDURE — 84100 ASSAY OF PHOSPHORUS: CPT | Performed by: EMERGENCY MEDICINE

## 2020-04-21 PROCEDURE — 83880 ASSAY OF NATRIURETIC PEPTIDE: CPT | Performed by: EMERGENCY MEDICINE

## 2020-04-21 PROCEDURE — 84484 ASSAY OF TROPONIN QUANT: CPT | Performed by: EMERGENCY MEDICINE

## 2020-04-21 PROCEDURE — C2617 STENT, NON-COR, TEM W/O DEL: HCPCS | Performed by: UROLOGY

## 2020-04-21 PROCEDURE — 84443 ASSAY THYROID STIM HORMONE: CPT | Performed by: EMERGENCY MEDICINE

## 2020-04-21 PROCEDURE — 87186 SC STD MICRODIL/AGAR DIL: CPT | Performed by: EMERGENCY MEDICINE

## 2020-04-21 PROCEDURE — C1769 GUIDE WIRE: HCPCS | Performed by: UROLOGY

## 2020-04-21 PROCEDURE — 96375 TX/PRO/DX INJ NEW DRUG ADDON: CPT

## 2020-04-21 PROCEDURE — 87181 SC STD AGAR DILUTION PER AGT: CPT | Performed by: EMERGENCY MEDICINE

## 2020-04-21 PROCEDURE — 99223 1ST HOSP IP/OBS HIGH 75: CPT | Performed by: UROLOGY

## 2020-04-21 PROCEDURE — 83690 ASSAY OF LIPASE: CPT | Performed by: EMERGENCY MEDICINE

## 2020-04-21 PROCEDURE — 82948 REAGENT STRIP/BLOOD GLUCOSE: CPT

## 2020-04-21 PROCEDURE — 93005 ELECTROCARDIOGRAM TRACING: CPT

## 2020-04-21 PROCEDURE — 74176 CT ABD & PELVIS W/O CONTRAST: CPT

## 2020-04-21 PROCEDURE — 96365 THER/PROPH/DIAG IV INF INIT: CPT

## 2020-04-21 PROCEDURE — 80076 HEPATIC FUNCTION PANEL: CPT | Performed by: EMERGENCY MEDICINE

## 2020-04-21 PROCEDURE — 36415 COLL VENOUS BLD VENIPUNCTURE: CPT | Performed by: EMERGENCY MEDICINE

## 2020-04-21 PROCEDURE — 85379 FIBRIN DEGRADATION QUANT: CPT | Performed by: EMERGENCY MEDICINE

## 2020-04-21 DEVICE — STENT URETERAL 6 FR 26CM INLAY OPTIMA: Type: IMPLANTABLE DEVICE | Site: URETER | Status: FUNCTIONAL

## 2020-04-21 RX ORDER — ONDANSETRON 2 MG/ML
4 INJECTION INTRAMUSCULAR; INTRAVENOUS ONCE
Status: COMPLETED | OUTPATIENT
Start: 2020-04-21 | End: 2020-04-21

## 2020-04-21 RX ORDER — FENTANYL CITRATE/PF 50 MCG/ML
25 SYRINGE (ML) INJECTION
Status: DISCONTINUED | OUTPATIENT
Start: 2020-04-21 | End: 2020-04-21 | Stop reason: HOSPADM

## 2020-04-21 RX ORDER — ACETAMINOPHEN 325 MG/1
650 TABLET ORAL EVERY 6 HOURS PRN
Status: DISCONTINUED | OUTPATIENT
Start: 2020-04-21 | End: 2020-04-27 | Stop reason: HOSPADM

## 2020-04-21 RX ORDER — SUCCINYLCHOLINE/SOD CL,ISO/PF 100 MG/5ML
SYRINGE (ML) INTRAVENOUS AS NEEDED
Status: DISCONTINUED | OUTPATIENT
Start: 2020-04-21 | End: 2020-04-21 | Stop reason: SURG

## 2020-04-21 RX ORDER — ASPIRIN 81 MG/1
81 TABLET, CHEWABLE ORAL DAILY
Status: DISCONTINUED | OUTPATIENT
Start: 2020-04-22 | End: 2020-04-27 | Stop reason: HOSPADM

## 2020-04-21 RX ORDER — COLCHICINE 0.6 MG/1
0.6 TABLET ORAL DAILY
Status: DISCONTINUED | OUTPATIENT
Start: 2020-04-22 | End: 2020-04-27 | Stop reason: HOSPADM

## 2020-04-21 RX ORDER — CHOLECALCIFEROL (VITAMIN D3) 10 MCG
1 TABLET ORAL
Status: DISCONTINUED | OUTPATIENT
Start: 2020-04-22 | End: 2020-04-27 | Stop reason: HOSPADM

## 2020-04-21 RX ORDER — HEPARIN SODIUM 5000 [USP'U]/ML
5000 INJECTION, SOLUTION INTRAVENOUS; SUBCUTANEOUS EVERY 8 HOURS SCHEDULED
Status: DISCONTINUED | OUTPATIENT
Start: 2020-04-22 | End: 2020-04-27 | Stop reason: HOSPADM

## 2020-04-21 RX ORDER — CEFAZOLIN SODIUM 1 G/50ML
1000 SOLUTION INTRAVENOUS ONCE
Status: CANCELLED | OUTPATIENT
Start: 2020-04-21 | End: 2020-04-21

## 2020-04-21 RX ORDER — POTASSIUM CHLORIDE 20 MEQ/1
20 TABLET, EXTENDED RELEASE ORAL 3 TIMES WEEKLY
Status: DISCONTINUED | OUTPATIENT
Start: 2020-04-22 | End: 2020-04-27 | Stop reason: HOSPADM

## 2020-04-21 RX ORDER — LIDOCAINE HYDROCHLORIDE 10 MG/ML
INJECTION, SOLUTION EPIDURAL; INFILTRATION; INTRACAUDAL; PERINEURAL AS NEEDED
Status: DISCONTINUED | OUTPATIENT
Start: 2020-04-21 | End: 2020-04-21 | Stop reason: SURG

## 2020-04-21 RX ORDER — TAMSULOSIN HYDROCHLORIDE 0.4 MG/1
0.4 CAPSULE ORAL
Status: DISCONTINUED | OUTPATIENT
Start: 2020-04-22 | End: 2020-04-27 | Stop reason: HOSPADM

## 2020-04-21 RX ORDER — SIMETHICONE 80 MG
80 TABLET,CHEWABLE ORAL 4 TIMES DAILY PRN
Status: DISCONTINUED | OUTPATIENT
Start: 2020-04-21 | End: 2020-04-27 | Stop reason: HOSPADM

## 2020-04-21 RX ORDER — PROPOFOL 10 MG/ML
INJECTION, EMULSION INTRAVENOUS AS NEEDED
Status: DISCONTINUED | OUTPATIENT
Start: 2020-04-21 | End: 2020-04-21 | Stop reason: SURG

## 2020-04-21 RX ORDER — ONDANSETRON 2 MG/ML
INJECTION INTRAMUSCULAR; INTRAVENOUS AS NEEDED
Status: DISCONTINUED | OUTPATIENT
Start: 2020-04-21 | End: 2020-04-21 | Stop reason: SURG

## 2020-04-21 RX ORDER — PANTOPRAZOLE SODIUM 40 MG/1
40 TABLET, DELAYED RELEASE ORAL
Status: DISCONTINUED | OUTPATIENT
Start: 2020-04-22 | End: 2020-04-27 | Stop reason: HOSPADM

## 2020-04-21 RX ORDER — ONDANSETRON 2 MG/ML
4 INJECTION INTRAMUSCULAR; INTRAVENOUS ONCE AS NEEDED
Status: DISCONTINUED | OUTPATIENT
Start: 2020-04-21 | End: 2020-04-21 | Stop reason: HOSPADM

## 2020-04-21 RX ORDER — MAGNESIUM HYDROXIDE 1200 MG/15ML
LIQUID ORAL AS NEEDED
Status: DISCONTINUED | OUTPATIENT
Start: 2020-04-21 | End: 2020-04-21 | Stop reason: HOSPADM

## 2020-04-21 RX ORDER — OXYCODONE HYDROCHLORIDE 5 MG/1
5 TABLET ORAL EVERY 4 HOURS PRN
Status: DISCONTINUED | OUTPATIENT
Start: 2020-04-21 | End: 2020-04-27 | Stop reason: HOSPADM

## 2020-04-21 RX ORDER — HYDROMORPHONE HCL/PF 1 MG/ML
0.5 SYRINGE (ML) INJECTION ONCE
Status: COMPLETED | OUTPATIENT
Start: 2020-04-21 | End: 2020-04-21

## 2020-04-21 RX ORDER — METHENAMINE HIPPURATE 1000 MG/1
1 TABLET ORAL 3 TIMES DAILY
Status: DISCONTINUED | OUTPATIENT
Start: 2020-04-22 | End: 2020-04-27 | Stop reason: HOSPADM

## 2020-04-21 RX ORDER — AMLODIPINE BESYLATE 10 MG/1
10 TABLET ORAL DAILY
Status: DISCONTINUED | OUTPATIENT
Start: 2020-04-22 | End: 2020-04-24

## 2020-04-21 RX ORDER — MORPHINE SULFATE 4 MG/ML
4 INJECTION, SOLUTION INTRAMUSCULAR; INTRAVENOUS ONCE
Status: COMPLETED | OUTPATIENT
Start: 2020-04-21 | End: 2020-04-21

## 2020-04-21 RX ORDER — SODIUM CHLORIDE, SODIUM LACTATE, POTASSIUM CHLORIDE, CALCIUM CHLORIDE 600; 310; 30; 20 MG/100ML; MG/100ML; MG/100ML; MG/100ML
INJECTION, SOLUTION INTRAVENOUS CONTINUOUS PRN
Status: DISCONTINUED | OUTPATIENT
Start: 2020-04-21 | End: 2020-04-21 | Stop reason: SURG

## 2020-04-21 RX ORDER — FUROSEMIDE 10 MG/ML
40 INJECTION INTRAMUSCULAR; INTRAVENOUS ONCE
Status: COMPLETED | OUTPATIENT
Start: 2020-04-21 | End: 2020-04-21

## 2020-04-21 RX ORDER — MELATONIN
1000 DAILY
Status: DISCONTINUED | OUTPATIENT
Start: 2020-04-22 | End: 2020-04-27 | Stop reason: HOSPADM

## 2020-04-21 RX ADMIN — MORPHINE SULFATE 2 MG: 2 INJECTION, SOLUTION INTRAMUSCULAR; INTRAVENOUS at 13:28

## 2020-04-21 RX ADMIN — CEFTRIAXONE SODIUM 1000 MG: 10 INJECTION, POWDER, FOR SOLUTION INTRAVENOUS at 19:07

## 2020-04-21 RX ADMIN — FUROSEMIDE 40 MG: 10 INJECTION, SOLUTION INTRAMUSCULAR; INTRAVENOUS at 18:35

## 2020-04-21 RX ADMIN — MORPHINE SULFATE 2 MG: 2 INJECTION, SOLUTION INTRAMUSCULAR; INTRAVENOUS at 14:15

## 2020-04-21 RX ADMIN — ONDANSETRON 4 MG: 2 INJECTION INTRAMUSCULAR; INTRAVENOUS at 13:28

## 2020-04-21 RX ADMIN — ONDANSETRON 4 MG: 2 INJECTION INTRAMUSCULAR; INTRAVENOUS at 20:02

## 2020-04-21 RX ADMIN — SODIUM CHLORIDE, SODIUM LACTATE, POTASSIUM CHLORIDE, AND CALCIUM CHLORIDE: .6; .31; .03; .02 INJECTION, SOLUTION INTRAVENOUS at 21:39

## 2020-04-21 RX ADMIN — PHENYLEPHRINE HYDROCHLORIDE 200 MCG: 10 INJECTION INTRAVENOUS at 22:20

## 2020-04-21 RX ADMIN — MORPHINE SULFATE 4 MG: 4 INJECTION INTRAVENOUS at 16:08

## 2020-04-21 RX ADMIN — ONDANSETRON 4 MG: 2 INJECTION INTRAMUSCULAR; INTRAVENOUS at 22:11

## 2020-04-21 RX ADMIN — ONDANSETRON 4 MG: 2 INJECTION INTRAMUSCULAR; INTRAVENOUS at 16:08

## 2020-04-21 RX ADMIN — SODIUM CHLORIDE 1000 ML: 0.9 INJECTION, SOLUTION INTRAVENOUS at 13:27

## 2020-04-21 RX ADMIN — PROPOFOL 70 MG: 10 INJECTION, EMULSION INTRAVENOUS at 22:11

## 2020-04-21 RX ADMIN — Medication 100 MG: at 22:11

## 2020-04-21 RX ADMIN — LIDOCAINE HYDROCHLORIDE 50 MG: 10 INJECTION, SOLUTION EPIDURAL; INFILTRATION; INTRACAUDAL; PERINEURAL at 22:11

## 2020-04-21 RX ADMIN — HYDROMORPHONE HYDROCHLORIDE 0.5 MG: 1 INJECTION, SOLUTION INTRAMUSCULAR; INTRAVENOUS; SUBCUTANEOUS at 18:26

## 2020-04-22 ENCOUNTER — TELEPHONE (OUTPATIENT)
Dept: OTHER | Facility: HOSPITAL | Age: 80
End: 2020-04-22

## 2020-04-22 ENCOUNTER — APPOINTMENT (INPATIENT)
Dept: RADIOLOGY | Facility: HOSPITAL | Age: 80
DRG: 660 | End: 2020-04-22
Payer: COMMERCIAL

## 2020-04-22 PROBLEM — T17.910A ASPIRATION OF VOMITUS: Status: ACTIVE | Noted: 2020-04-22

## 2020-04-22 PROBLEM — N20.1 URETEROLITHIASIS: Status: ACTIVE | Noted: 2020-04-22

## 2020-04-22 LAB
ANION GAP SERPL CALCULATED.3IONS-SCNC: 17 MMOL/L (ref 4–13)
ANISOCYTOSIS BLD QL SMEAR: PRESENT
BASOPHILS # BLD MANUAL: 0 THOUSAND/UL (ref 0–0.1)
BASOPHILS NFR MAR MANUAL: 0 % (ref 0–1)
BUN SERPL-MCNC: 41 MG/DL (ref 5–25)
CALCIUM SERPL-MCNC: 8.5 MG/DL (ref 8.3–10.1)
CHLORIDE SERPL-SCNC: 105 MMOL/L (ref 100–108)
CO2 SERPL-SCNC: 17 MMOL/L (ref 21–32)
CREAT SERPL-MCNC: 3.18 MG/DL (ref 0.6–1.3)
EOSINOPHIL # BLD MANUAL: 0 THOUSAND/UL (ref 0–0.4)
EOSINOPHIL NFR BLD MANUAL: 0 % (ref 0–6)
ERYTHROCYTE [DISTWIDTH] IN BLOOD BY AUTOMATED COUNT: 15.6 % (ref 11.6–15.1)
GFR SERPL CREATININE-BSD FRML MDRD: 17 ML/MIN/1.73SQ M
GLUCOSE SERPL-MCNC: 161 MG/DL (ref 65–140)
GLUCOSE SERPL-MCNC: 176 MG/DL (ref 65–140)
GLUCOSE SERPL-MCNC: 193 MG/DL (ref 65–140)
GLUCOSE SERPL-MCNC: 249 MG/DL (ref 65–140)
GLUCOSE SERPL-MCNC: 250 MG/DL (ref 65–140)
HCT VFR BLD AUTO: 37.3 % (ref 36.5–49.3)
HGB BLD-MCNC: 11.6 G/DL (ref 12–17)
LYMPHOCYTES # BLD AUTO: 0.64 THOUSAND/UL (ref 0.6–4.47)
LYMPHOCYTES # BLD AUTO: 2 % (ref 14–44)
MCH RBC QN AUTO: 30.1 PG (ref 26.8–34.3)
MCHC RBC AUTO-ENTMCNC: 31.1 G/DL (ref 31.4–37.4)
MCV RBC AUTO: 97 FL (ref 82–98)
MONOCYTES # BLD AUTO: 1.27 THOUSAND/UL (ref 0–1.22)
MONOCYTES NFR BLD: 4 % (ref 4–12)
NEUTROPHILS # BLD MANUAL: 29.26 THOUSAND/UL (ref 1.85–7.62)
NEUTS BAND NFR BLD MANUAL: 13 % (ref 0–8)
NEUTS SEG NFR BLD AUTO: 79 % (ref 43–75)
NRBC BLD AUTO-RTO: 0 /100 WBCS
PLATELET # BLD AUTO: 184 THOUSANDS/UL (ref 149–390)
PLATELET BLD QL SMEAR: ADEQUATE
PMV BLD AUTO: 11.2 FL (ref 8.9–12.7)
POTASSIUM SERPL-SCNC: 5.3 MMOL/L (ref 3.5–5.3)
RBC # BLD AUTO: 3.85 MILLION/UL (ref 3.88–5.62)
SODIUM SERPL-SCNC: 139 MMOL/L (ref 136–145)
TOTAL CELLS COUNTED SPEC: 100
VARIANT LYMPHS # BLD AUTO: 2 %
WBC # BLD AUTO: 31.8 THOUSAND/UL (ref 4.31–10.16)

## 2020-04-22 PROCEDURE — 99223 1ST HOSP IP/OBS HIGH 75: CPT | Performed by: INTERNAL MEDICINE

## 2020-04-22 PROCEDURE — 82948 REAGENT STRIP/BLOOD GLUCOSE: CPT

## 2020-04-22 PROCEDURE — 85027 COMPLETE CBC AUTOMATED: CPT | Performed by: PHYSICIAN ASSISTANT

## 2020-04-22 PROCEDURE — 85007 BL SMEAR W/DIFF WBC COUNT: CPT | Performed by: PHYSICIAN ASSISTANT

## 2020-04-22 PROCEDURE — 80048 BASIC METABOLIC PNL TOTAL CA: CPT | Performed by: PHYSICIAN ASSISTANT

## 2020-04-22 PROCEDURE — 71046 X-RAY EXAM CHEST 2 VIEWS: CPT

## 2020-04-22 PROCEDURE — 99232 SBSQ HOSP IP/OBS MODERATE 35: CPT | Performed by: NURSE PRACTITIONER

## 2020-04-22 PROCEDURE — 92610 EVALUATE SWALLOWING FUNCTION: CPT

## 2020-04-22 RX ORDER — OXYCODONE HYDROCHLORIDE 10 MG/1
10 TABLET ORAL EVERY 4 HOURS PRN
Status: DISCONTINUED | OUTPATIENT
Start: 2020-04-22 | End: 2020-04-27 | Stop reason: HOSPADM

## 2020-04-22 RX ORDER — HYDROMORPHONE HCL/PF 1 MG/ML
0.5 SYRINGE (ML) INJECTION EVERY 4 HOURS PRN
Status: DISCONTINUED | OUTPATIENT
Start: 2020-04-22 | End: 2020-04-27 | Stop reason: HOSPADM

## 2020-04-22 RX ADMIN — INSULIN LISPRO 2 UNITS: 100 INJECTION, SOLUTION INTRAVENOUS; SUBCUTANEOUS at 21:30

## 2020-04-22 RX ADMIN — METHENAMINE HIPPURATE 1 G: 1 TABLET ORAL at 21:27

## 2020-04-22 RX ADMIN — ERTAPENEM SODIUM 500 MG: 1 INJECTION, POWDER, LYOPHILIZED, FOR SOLUTION INTRAMUSCULAR; INTRAVENOUS at 21:31

## 2020-04-22 RX ADMIN — AMLODIPINE BESYLATE 10 MG: 10 TABLET ORAL at 09:43

## 2020-04-22 RX ADMIN — TAMSULOSIN HYDROCHLORIDE 0.4 MG: 0.4 CAPSULE ORAL at 17:00

## 2020-04-22 RX ADMIN — INSULIN LISPRO 2 UNITS: 100 INJECTION, SOLUTION INTRAVENOUS; SUBCUTANEOUS at 17:03

## 2020-04-22 RX ADMIN — PANTOPRAZOLE SODIUM 40 MG: 40 TABLET, DELAYED RELEASE ORAL at 05:41

## 2020-04-22 RX ADMIN — COLCHICINE 0.6 MG: 0.6 TABLET, FILM COATED ORAL at 09:43

## 2020-04-22 RX ADMIN — VANCOMYCIN HYDROCHLORIDE 125 MG: 500 INJECTION, POWDER, LYOPHILIZED, FOR SOLUTION INTRAVENOUS at 07:01

## 2020-04-22 RX ADMIN — MAGNESIUM GLUCONATE 500 MG ORAL TABLET 400 MG: 500 TABLET ORAL at 17:00

## 2020-04-22 RX ADMIN — MELATONIN 1000 UNITS: at 09:44

## 2020-04-22 RX ADMIN — INSULIN LISPRO 1 UNITS: 100 INJECTION, SOLUTION INTRAVENOUS; SUBCUTANEOUS at 09:45

## 2020-04-22 RX ADMIN — INSULIN DETEMIR 5 UNITS: 100 INJECTION, SOLUTION SUBCUTANEOUS at 09:45

## 2020-04-22 RX ADMIN — OXYCODONE HYDROCHLORIDE 5 MG: 5 TABLET ORAL at 05:49

## 2020-04-22 RX ADMIN — HEPARIN SODIUM 5000 UNITS: 5000 INJECTION INTRAVENOUS; SUBCUTANEOUS at 21:27

## 2020-04-22 RX ADMIN — HEPARIN SODIUM 5000 UNITS: 5000 INJECTION INTRAVENOUS; SUBCUTANEOUS at 05:41

## 2020-04-22 RX ADMIN — INSULIN LISPRO 1 UNITS: 100 INJECTION, SOLUTION INTRAVENOUS; SUBCUTANEOUS at 00:36

## 2020-04-22 RX ADMIN — VANCOMYCIN HYDROCHLORIDE 125 MG: 500 INJECTION, POWDER, LYOPHILIZED, FOR SOLUTION INTRAVENOUS at 02:02

## 2020-04-22 RX ADMIN — HEPARIN SODIUM 5000 UNITS: 5000 INJECTION INTRAVENOUS; SUBCUTANEOUS at 12:46

## 2020-04-22 RX ADMIN — MAGNESIUM GLUCONATE 500 MG ORAL TABLET 400 MG: 500 TABLET ORAL at 09:43

## 2020-04-22 RX ADMIN — Medication 1 CAPSULE: at 17:00

## 2020-04-22 RX ADMIN — METHENAMINE HIPPURATE 1 G: 1 TABLET ORAL at 09:44

## 2020-04-22 RX ADMIN — INSULIN LISPRO 1 UNITS: 100 INJECTION, SOLUTION INTRAVENOUS; SUBCUTANEOUS at 12:39

## 2020-04-22 RX ADMIN — ASPIRIN 81 MG 81 MG: 81 TABLET ORAL at 09:43

## 2020-04-22 RX ADMIN — VANCOMYCIN HYDROCHLORIDE 125 MG: 500 INJECTION, POWDER, LYOPHILIZED, FOR SOLUTION INTRAVENOUS at 21:27

## 2020-04-22 RX ADMIN — METHENAMINE HIPPURATE 1 G: 1 TABLET ORAL at 16:59

## 2020-04-23 PROBLEM — N17.9 ACUTE RENAL FAILURE SUPERIMPOSED ON STAGE 4 CHRONIC KIDNEY DISEASE (HCC): Status: ACTIVE | Noted: 2020-02-25

## 2020-04-23 LAB
ANION GAP SERPL CALCULATED.3IONS-SCNC: 11 MMOL/L (ref 4–13)
BASOPHILS # BLD AUTO: 0.04 THOUSANDS/ΜL (ref 0–0.1)
BASOPHILS NFR BLD AUTO: 0 % (ref 0–1)
BUN SERPL-MCNC: 52 MG/DL (ref 5–25)
CALCIUM SERPL-MCNC: 8.5 MG/DL (ref 8.3–10.1)
CHLORIDE SERPL-SCNC: 104 MMOL/L (ref 100–108)
CO2 SERPL-SCNC: 22 MMOL/L (ref 21–32)
CREAT SERPL-MCNC: 3.84 MG/DL (ref 0.6–1.3)
EOSINOPHIL # BLD AUTO: 0.04 THOUSAND/ΜL (ref 0–0.61)
EOSINOPHIL NFR BLD AUTO: 0 % (ref 0–6)
ERYTHROCYTE [DISTWIDTH] IN BLOOD BY AUTOMATED COUNT: 15.5 % (ref 11.6–15.1)
GFR SERPL CREATININE-BSD FRML MDRD: 14 ML/MIN/1.73SQ M
GLUCOSE SERPL-MCNC: 134 MG/DL (ref 65–140)
GLUCOSE SERPL-MCNC: 138 MG/DL (ref 65–140)
GLUCOSE SERPL-MCNC: 155 MG/DL (ref 65–140)
GLUCOSE SERPL-MCNC: 167 MG/DL (ref 65–140)
GLUCOSE SERPL-MCNC: 208 MG/DL (ref 65–140)
HCT VFR BLD AUTO: 33.6 % (ref 36.5–49.3)
HGB BLD-MCNC: 10.7 G/DL (ref 12–17)
IMM GRANULOCYTES # BLD AUTO: 0.09 THOUSAND/UL (ref 0–0.2)
IMM GRANULOCYTES NFR BLD AUTO: 1 % (ref 0–2)
LYMPHOCYTES # BLD AUTO: 1.36 THOUSANDS/ΜL (ref 0.6–4.47)
LYMPHOCYTES NFR BLD AUTO: 9 % (ref 14–44)
MCH RBC QN AUTO: 30.1 PG (ref 26.8–34.3)
MCHC RBC AUTO-ENTMCNC: 31.8 G/DL (ref 31.4–37.4)
MCV RBC AUTO: 95 FL (ref 82–98)
MONOCYTES # BLD AUTO: 1.55 THOUSAND/ΜL (ref 0.17–1.22)
MONOCYTES NFR BLD AUTO: 10 % (ref 4–12)
NEUTROPHILS # BLD AUTO: 11.87 THOUSANDS/ΜL (ref 1.85–7.62)
NEUTS SEG NFR BLD AUTO: 80 % (ref 43–75)
NRBC BLD AUTO-RTO: 0 /100 WBCS
PLATELET # BLD AUTO: 135 THOUSANDS/UL (ref 149–390)
PMV BLD AUTO: 11.1 FL (ref 8.9–12.7)
POTASSIUM SERPL-SCNC: 4.7 MMOL/L (ref 3.5–5.3)
PROCALCITONIN SERPL-MCNC: 39.74 NG/ML
RBC # BLD AUTO: 3.55 MILLION/UL (ref 3.88–5.62)
SODIUM SERPL-SCNC: 137 MMOL/L (ref 136–145)
WBC # BLD AUTO: 14.95 THOUSAND/UL (ref 4.31–10.16)

## 2020-04-23 PROCEDURE — 80048 BASIC METABOLIC PNL TOTAL CA: CPT | Performed by: INTERNAL MEDICINE

## 2020-04-23 PROCEDURE — 99232 SBSQ HOSP IP/OBS MODERATE 35: CPT | Performed by: INTERNAL MEDICINE

## 2020-04-23 PROCEDURE — 85025 COMPLETE CBC W/AUTO DIFF WBC: CPT | Performed by: INTERNAL MEDICINE

## 2020-04-23 PROCEDURE — 99233 SBSQ HOSP IP/OBS HIGH 50: CPT | Performed by: INTERNAL MEDICINE

## 2020-04-23 PROCEDURE — 87493 C DIFF AMPLIFIED PROBE: CPT | Performed by: INTERNAL MEDICINE

## 2020-04-23 PROCEDURE — 97163 PT EVAL HIGH COMPLEX 45 MIN: CPT

## 2020-04-23 PROCEDURE — 84145 PROCALCITONIN (PCT): CPT | Performed by: INTERNAL MEDICINE

## 2020-04-23 PROCEDURE — 82948 REAGENT STRIP/BLOOD GLUCOSE: CPT

## 2020-04-23 PROCEDURE — 97110 THERAPEUTIC EXERCISES: CPT

## 2020-04-23 RX ORDER — SODIUM CHLORIDE 9 MG/ML
100 INJECTION, SOLUTION INTRAVENOUS CONTINUOUS
Status: DISPENSED | OUTPATIENT
Start: 2020-04-23 | End: 2020-04-23

## 2020-04-23 RX ADMIN — Medication 1 CAPSULE: at 15:48

## 2020-04-23 RX ADMIN — INSULIN LISPRO 1 UNITS: 100 INJECTION, SOLUTION INTRAVENOUS; SUBCUTANEOUS at 15:51

## 2020-04-23 RX ADMIN — MAGNESIUM GLUCONATE 500 MG ORAL TABLET 400 MG: 500 TABLET ORAL at 08:48

## 2020-04-23 RX ADMIN — COLCHICINE 0.6 MG: 0.6 TABLET, FILM COATED ORAL at 08:48

## 2020-04-23 RX ADMIN — VANCOMYCIN HYDROCHLORIDE 125 MG: 500 INJECTION, POWDER, LYOPHILIZED, FOR SOLUTION INTRAVENOUS at 21:44

## 2020-04-23 RX ADMIN — METHENAMINE HIPPURATE 1 G: 1 TABLET ORAL at 08:49

## 2020-04-23 RX ADMIN — HEPARIN SODIUM 5000 UNITS: 5000 INJECTION INTRAVENOUS; SUBCUTANEOUS at 06:43

## 2020-04-23 RX ADMIN — ERTAPENEM SODIUM 500 MG: 1 INJECTION, POWDER, LYOPHILIZED, FOR SOLUTION INTRAMUSCULAR; INTRAVENOUS at 21:44

## 2020-04-23 RX ADMIN — METHENAMINE HIPPURATE 1 G: 1 TABLET ORAL at 15:49

## 2020-04-23 RX ADMIN — HEPARIN SODIUM 5000 UNITS: 5000 INJECTION INTRAVENOUS; SUBCUTANEOUS at 13:58

## 2020-04-23 RX ADMIN — METHENAMINE HIPPURATE 1 G: 1 TABLET ORAL at 21:44

## 2020-04-23 RX ADMIN — SODIUM CHLORIDE 100 ML/HR: 0.9 INJECTION, SOLUTION INTRAVENOUS at 11:44

## 2020-04-23 RX ADMIN — INSULIN DETEMIR 5 UNITS: 100 INJECTION, SOLUTION SUBCUTANEOUS at 08:48

## 2020-04-23 RX ADMIN — ASPIRIN 81 MG 81 MG: 81 TABLET ORAL at 08:48

## 2020-04-23 RX ADMIN — INSULIN LISPRO 1 UNITS: 100 INJECTION, SOLUTION INTRAVENOUS; SUBCUTANEOUS at 21:45

## 2020-04-23 RX ADMIN — AMLODIPINE BESYLATE 10 MG: 10 TABLET ORAL at 08:48

## 2020-04-23 RX ADMIN — HEPARIN SODIUM 5000 UNITS: 5000 INJECTION INTRAVENOUS; SUBCUTANEOUS at 21:44

## 2020-04-23 RX ADMIN — PANTOPRAZOLE SODIUM 40 MG: 40 TABLET, DELAYED RELEASE ORAL at 06:44

## 2020-04-23 RX ADMIN — TAMSULOSIN HYDROCHLORIDE 0.4 MG: 0.4 CAPSULE ORAL at 15:49

## 2020-04-23 RX ADMIN — VANCOMYCIN HYDROCHLORIDE 125 MG: 500 INJECTION, POWDER, LYOPHILIZED, FOR SOLUTION INTRAVENOUS at 08:49

## 2020-04-23 RX ADMIN — MELATONIN 1000 UNITS: at 08:48

## 2020-04-23 RX ADMIN — MAGNESIUM GLUCONATE 500 MG ORAL TABLET 400 MG: 500 TABLET ORAL at 15:49

## 2020-04-23 RX ADMIN — INSULIN LISPRO 1 UNITS: 100 INJECTION, SOLUTION INTRAVENOUS; SUBCUTANEOUS at 11:47

## 2020-04-24 PROBLEM — T17.910A ASPIRATION OF VOMITUS: Status: RESOLVED | Noted: 2020-04-22 | Resolved: 2020-04-24

## 2020-04-24 LAB
ANION GAP SERPL CALCULATED.3IONS-SCNC: 13 MMOL/L (ref 4–13)
ATRIAL RATE: 69 BPM
ATRIAL RATE: 76 BPM
BACTERIA BLD CULT: ABNORMAL
BACTERIA BLD CULT: ABNORMAL
BACTERIA UR CULT: ABNORMAL
BACTERIA UR CULT: ABNORMAL
BASOPHILS # BLD AUTO: 0.04 THOUSANDS/ΜL (ref 0–0.1)
BASOPHILS NFR BLD AUTO: 1 % (ref 0–1)
BUN SERPL-MCNC: 54 MG/DL (ref 5–25)
C DIFF TOX GENS STL QL NAA+PROBE: NEGATIVE
CALCIUM SERPL-MCNC: 8.1 MG/DL (ref 8.3–10.1)
CHLORIDE SERPL-SCNC: 105 MMOL/L (ref 100–108)
CO2 SERPL-SCNC: 20 MMOL/L (ref 21–32)
CREAT SERPL-MCNC: 3.98 MG/DL (ref 0.6–1.3)
EOSINOPHIL # BLD AUTO: 0.1 THOUSAND/ΜL (ref 0–0.61)
EOSINOPHIL NFR BLD AUTO: 1 % (ref 0–6)
ERYTHROCYTE [DISTWIDTH] IN BLOOD BY AUTOMATED COUNT: 15.1 % (ref 11.6–15.1)
GFR SERPL CREATININE-BSD FRML MDRD: 13 ML/MIN/1.73SQ M
GLUCOSE SERPL-MCNC: 103 MG/DL (ref 65–140)
GLUCOSE SERPL-MCNC: 178 MG/DL (ref 65–140)
GLUCOSE SERPL-MCNC: 270 MG/DL (ref 65–140)
GLUCOSE SERPL-MCNC: 279 MG/DL (ref 65–140)
GLUCOSE SERPL-MCNC: 87 MG/DL (ref 65–140)
GRAM STN SPEC: ABNORMAL
GRAM STN SPEC: ABNORMAL
HCT VFR BLD AUTO: 30.7 % (ref 36.5–49.3)
HGB BLD-MCNC: 9.9 G/DL (ref 12–17)
IMM GRANULOCYTES # BLD AUTO: 0.03 THOUSAND/UL (ref 0–0.2)
IMM GRANULOCYTES NFR BLD AUTO: 0 % (ref 0–2)
LYMPHOCYTES # BLD AUTO: 0.94 THOUSANDS/ΜL (ref 0.6–4.47)
LYMPHOCYTES NFR BLD AUTO: 12 % (ref 14–44)
MCH RBC QN AUTO: 30.5 PG (ref 26.8–34.3)
MCHC RBC AUTO-ENTMCNC: 32.2 G/DL (ref 31.4–37.4)
MCV RBC AUTO: 95 FL (ref 82–98)
MONOCYTES # BLD AUTO: 0.78 THOUSAND/ΜL (ref 0.17–1.22)
MONOCYTES NFR BLD AUTO: 10 % (ref 4–12)
NEUTROPHILS # BLD AUTO: 6.05 THOUSANDS/ΜL (ref 1.85–7.62)
NEUTS SEG NFR BLD AUTO: 76 % (ref 43–75)
NRBC BLD AUTO-RTO: 0 /100 WBCS
P AXIS: 47 DEGREES
P AXIS: 83 DEGREES
PLATELET # BLD AUTO: 128 THOUSANDS/UL (ref 149–390)
PMV BLD AUTO: 10.9 FL (ref 8.9–12.7)
POTASSIUM SERPL-SCNC: 4.3 MMOL/L (ref 3.5–5.3)
PR INTERVAL: 140 MS
PR INTERVAL: 158 MS
PROCALCITONIN SERPL-MCNC: 30.28 NG/ML
QRS AXIS: -60 DEGREES
QRS AXIS: -63 DEGREES
QRSD INTERVAL: 110 MS
QRSD INTERVAL: 98 MS
QT INTERVAL: 392 MS
QT INTERVAL: 408 MS
QTC INTERVAL: 420 MS
QTC INTERVAL: 459 MS
RBC # BLD AUTO: 3.25 MILLION/UL (ref 3.88–5.62)
SODIUM SERPL-SCNC: 138 MMOL/L (ref 136–145)
T WAVE AXIS: 85 DEGREES
T WAVE AXIS: 90 DEGREES
VENTRICULAR RATE: 69 BPM
VENTRICULAR RATE: 76 BPM
WBC # BLD AUTO: 7.94 THOUSAND/UL (ref 4.31–10.16)

## 2020-04-24 PROCEDURE — 97167 OT EVAL HIGH COMPLEX 60 MIN: CPT

## 2020-04-24 PROCEDURE — 82948 REAGENT STRIP/BLOOD GLUCOSE: CPT

## 2020-04-24 PROCEDURE — 84145 PROCALCITONIN (PCT): CPT | Performed by: INTERNAL MEDICINE

## 2020-04-24 PROCEDURE — 99232 SBSQ HOSP IP/OBS MODERATE 35: CPT | Performed by: INTERNAL MEDICINE

## 2020-04-24 PROCEDURE — 80048 BASIC METABOLIC PNL TOTAL CA: CPT | Performed by: INTERNAL MEDICINE

## 2020-04-24 PROCEDURE — 97535 SELF CARE MNGMENT TRAINING: CPT

## 2020-04-24 PROCEDURE — 93010 ELECTROCARDIOGRAM REPORT: CPT | Performed by: INTERNAL MEDICINE

## 2020-04-24 PROCEDURE — 99223 1ST HOSP IP/OBS HIGH 75: CPT | Performed by: INTERNAL MEDICINE

## 2020-04-24 PROCEDURE — 97530 THERAPEUTIC ACTIVITIES: CPT

## 2020-04-24 PROCEDURE — 85025 COMPLETE CBC W/AUTO DIFF WBC: CPT | Performed by: INTERNAL MEDICINE

## 2020-04-24 PROCEDURE — 99233 SBSQ HOSP IP/OBS HIGH 50: CPT | Performed by: INTERNAL MEDICINE

## 2020-04-24 PROCEDURE — 97110 THERAPEUTIC EXERCISES: CPT

## 2020-04-24 RX ORDER — AMLODIPINE BESYLATE 5 MG/1
5 TABLET ORAL DAILY
Status: DISCONTINUED | OUTPATIENT
Start: 2020-04-25 | End: 2020-04-27 | Stop reason: HOSPADM

## 2020-04-24 RX ORDER — AMLODIPINE BESYLATE 10 MG/1
10 TABLET ORAL DAILY
Status: DISCONTINUED | OUTPATIENT
Start: 2020-04-25 | End: 2020-04-24

## 2020-04-24 RX ADMIN — MAGNESIUM GLUCONATE 500 MG ORAL TABLET 400 MG: 500 TABLET ORAL at 08:36

## 2020-04-24 RX ADMIN — MELATONIN 1000 UNITS: at 08:36

## 2020-04-24 RX ADMIN — Medication 1 CAPSULE: at 17:31

## 2020-04-24 RX ADMIN — TAMSULOSIN HYDROCHLORIDE 0.4 MG: 0.4 CAPSULE ORAL at 17:31

## 2020-04-24 RX ADMIN — INSULIN DETEMIR 10 UNITS: 100 INJECTION, SOLUTION SUBCUTANEOUS at 09:42

## 2020-04-24 RX ADMIN — ASPIRIN 81 MG 81 MG: 81 TABLET ORAL at 08:36

## 2020-04-24 RX ADMIN — INSULIN LISPRO 3 UNITS: 100 INJECTION, SOLUTION INTRAVENOUS; SUBCUTANEOUS at 17:31

## 2020-04-24 RX ADMIN — SODIUM BICARBONATE 75 ML/HR: 84 INJECTION, SOLUTION INTRAVENOUS at 14:08

## 2020-04-24 RX ADMIN — PANTOPRAZOLE SODIUM 40 MG: 40 TABLET, DELAYED RELEASE ORAL at 06:11

## 2020-04-24 RX ADMIN — HEPARIN SODIUM 5000 UNITS: 5000 INJECTION INTRAVENOUS; SUBCUTANEOUS at 21:43

## 2020-04-24 RX ADMIN — COLCHICINE 0.6 MG: 0.6 TABLET, FILM COATED ORAL at 08:38

## 2020-04-24 RX ADMIN — MAGNESIUM GLUCONATE 500 MG ORAL TABLET 400 MG: 500 TABLET ORAL at 17:31

## 2020-04-24 RX ADMIN — METHENAMINE HIPPURATE 1 G: 1 TABLET ORAL at 21:42

## 2020-04-24 RX ADMIN — INSULIN LISPRO 3 UNITS: 100 INJECTION, SOLUTION INTRAVENOUS; SUBCUTANEOUS at 12:39

## 2020-04-24 RX ADMIN — VANCOMYCIN HYDROCHLORIDE 125 MG: 500 INJECTION, POWDER, LYOPHILIZED, FOR SOLUTION INTRAVENOUS at 09:42

## 2020-04-24 RX ADMIN — HEPARIN SODIUM 5000 UNITS: 5000 INJECTION INTRAVENOUS; SUBCUTANEOUS at 06:11

## 2020-04-24 RX ADMIN — METHENAMINE HIPPURATE 1 G: 1 TABLET ORAL at 08:36

## 2020-04-24 RX ADMIN — METHENAMINE HIPPURATE 1 G: 1 TABLET ORAL at 17:31

## 2020-04-24 RX ADMIN — ERTAPENEM SODIUM 500 MG: 1 INJECTION, POWDER, LYOPHILIZED, FOR SOLUTION INTRAMUSCULAR; INTRAVENOUS at 21:42

## 2020-04-24 RX ADMIN — INSULIN LISPRO 1 UNITS: 100 INJECTION, SOLUTION INTRAVENOUS; SUBCUTANEOUS at 21:42

## 2020-04-24 RX ADMIN — POTASSIUM CHLORIDE 20 MEQ: 1500 TABLET, EXTENDED RELEASE ORAL at 08:36

## 2020-04-24 RX ADMIN — AMLODIPINE BESYLATE 10 MG: 10 TABLET ORAL at 08:36

## 2020-04-24 RX ADMIN — HEPARIN SODIUM 5000 UNITS: 5000 INJECTION INTRAVENOUS; SUBCUTANEOUS at 13:08

## 2020-04-24 RX ADMIN — VANCOMYCIN HYDROCHLORIDE 125 MG: 500 INJECTION, POWDER, LYOPHILIZED, FOR SOLUTION INTRAVENOUS at 21:42

## 2020-04-25 LAB
ANION GAP SERPL CALCULATED.3IONS-SCNC: 12 MMOL/L (ref 4–13)
BUN SERPL-MCNC: 54 MG/DL (ref 5–25)
CALCIUM SERPL-MCNC: 8.1 MG/DL (ref 8.3–10.1)
CHLORIDE SERPL-SCNC: 105 MMOL/L (ref 100–108)
CO2 SERPL-SCNC: 23 MMOL/L (ref 21–32)
CREAT SERPL-MCNC: 3.69 MG/DL (ref 0.6–1.3)
GFR SERPL CREATININE-BSD FRML MDRD: 14 ML/MIN/1.73SQ M
GLUCOSE SERPL-MCNC: 101 MG/DL (ref 65–140)
GLUCOSE SERPL-MCNC: 190 MG/DL (ref 65–140)
GLUCOSE SERPL-MCNC: 281 MG/DL (ref 65–140)
GLUCOSE SERPL-MCNC: 56 MG/DL (ref 65–140)
GLUCOSE SERPL-MCNC: 61 MG/DL (ref 65–140)
POTASSIUM SERPL-SCNC: 4.4 MMOL/L (ref 3.5–5.3)
SODIUM SERPL-SCNC: 140 MMOL/L (ref 136–145)

## 2020-04-25 PROCEDURE — 99233 SBSQ HOSP IP/OBS HIGH 50: CPT | Performed by: INTERNAL MEDICINE

## 2020-04-25 PROCEDURE — 99232 SBSQ HOSP IP/OBS MODERATE 35: CPT | Performed by: INTERNAL MEDICINE

## 2020-04-25 PROCEDURE — 80048 BASIC METABOLIC PNL TOTAL CA: CPT | Performed by: PHYSICIAN ASSISTANT

## 2020-04-25 PROCEDURE — 82948 REAGENT STRIP/BLOOD GLUCOSE: CPT

## 2020-04-25 RX ADMIN — ERTAPENEM SODIUM 500 MG: 1 INJECTION, POWDER, LYOPHILIZED, FOR SOLUTION INTRAMUSCULAR; INTRAVENOUS at 21:58

## 2020-04-25 RX ADMIN — MAGNESIUM GLUCONATE 500 MG ORAL TABLET 400 MG: 500 TABLET ORAL at 08:16

## 2020-04-25 RX ADMIN — PANTOPRAZOLE SODIUM 40 MG: 40 TABLET, DELAYED RELEASE ORAL at 06:09

## 2020-04-25 RX ADMIN — INSULIN DETEMIR 10 UNITS: 100 INJECTION, SOLUTION SUBCUTANEOUS at 08:16

## 2020-04-25 RX ADMIN — HEPARIN SODIUM 5000 UNITS: 5000 INJECTION INTRAVENOUS; SUBCUTANEOUS at 21:58

## 2020-04-25 RX ADMIN — METHENAMINE HIPPURATE 1 G: 1 TABLET ORAL at 08:18

## 2020-04-25 RX ADMIN — INSULIN LISPRO 3 UNITS: 100 INJECTION, SOLUTION INTRAVENOUS; SUBCUTANEOUS at 18:18

## 2020-04-25 RX ADMIN — VANCOMYCIN HYDROCHLORIDE 125 MG: 500 INJECTION, POWDER, LYOPHILIZED, FOR SOLUTION INTRAVENOUS at 21:58

## 2020-04-25 RX ADMIN — HEPARIN SODIUM 5000 UNITS: 5000 INJECTION INTRAVENOUS; SUBCUTANEOUS at 06:10

## 2020-04-25 RX ADMIN — MAGNESIUM GLUCONATE 500 MG ORAL TABLET 400 MG: 500 TABLET ORAL at 18:17

## 2020-04-25 RX ADMIN — MELATONIN 1000 UNITS: at 08:16

## 2020-04-25 RX ADMIN — INSULIN LISPRO 1 UNITS: 100 INJECTION, SOLUTION INTRAVENOUS; SUBCUTANEOUS at 18:17

## 2020-04-25 RX ADMIN — SODIUM BICARBONATE 75 ML/HR: 84 INJECTION, SOLUTION INTRAVENOUS at 05:21

## 2020-04-25 RX ADMIN — VANCOMYCIN HYDROCHLORIDE 125 MG: 500 INJECTION, POWDER, LYOPHILIZED, FOR SOLUTION INTRAVENOUS at 08:30

## 2020-04-25 RX ADMIN — ASPIRIN 81 MG 81 MG: 81 TABLET ORAL at 08:16

## 2020-04-25 RX ADMIN — INSULIN LISPRO 3 UNITS: 100 INJECTION, SOLUTION INTRAVENOUS; SUBCUTANEOUS at 11:53

## 2020-04-25 RX ADMIN — HEPARIN SODIUM 5000 UNITS: 5000 INJECTION INTRAVENOUS; SUBCUTANEOUS at 13:22

## 2020-04-25 RX ADMIN — Medication 1 CAPSULE: at 18:16

## 2020-04-25 RX ADMIN — AMLODIPINE BESYLATE 5 MG: 5 TABLET ORAL at 08:16

## 2020-04-25 RX ADMIN — INSULIN LISPRO 3 UNITS: 100 INJECTION, SOLUTION INTRAVENOUS; SUBCUTANEOUS at 13:59

## 2020-04-25 RX ADMIN — METHENAMINE HIPPURATE 1 G: 1 TABLET ORAL at 18:17

## 2020-04-25 RX ADMIN — COLCHICINE 0.6 MG: 0.6 TABLET, FILM COATED ORAL at 08:16

## 2020-04-25 RX ADMIN — TAMSULOSIN HYDROCHLORIDE 0.4 MG: 0.4 CAPSULE ORAL at 18:16

## 2020-04-26 LAB
ANION GAP SERPL CALCULATED.3IONS-SCNC: 9 MMOL/L (ref 4–13)
BUN SERPL-MCNC: 51 MG/DL (ref 5–25)
CALCIUM SERPL-MCNC: 8.1 MG/DL (ref 8.3–10.1)
CHLORIDE SERPL-SCNC: 107 MMOL/L (ref 100–108)
CO2 SERPL-SCNC: 26 MMOL/L (ref 21–32)
CREAT SERPL-MCNC: 3.55 MG/DL (ref 0.6–1.3)
GFR SERPL CREATININE-BSD FRML MDRD: 15 ML/MIN/1.73SQ M
GLUCOSE SERPL-MCNC: 135 MG/DL (ref 65–140)
GLUCOSE SERPL-MCNC: 140 MG/DL (ref 65–140)
GLUCOSE SERPL-MCNC: 211 MG/DL (ref 65–140)
GLUCOSE SERPL-MCNC: 281 MG/DL (ref 65–140)
GLUCOSE SERPL-MCNC: 82 MG/DL (ref 65–140)
GLUCOSE SERPL-MCNC: 88 MG/DL (ref 65–140)
POTASSIUM SERPL-SCNC: 4.6 MMOL/L (ref 3.5–5.3)
PROCALCITONIN SERPL-MCNC: 8.52 NG/ML
SODIUM SERPL-SCNC: 142 MMOL/L (ref 136–145)

## 2020-04-26 PROCEDURE — 82948 REAGENT STRIP/BLOOD GLUCOSE: CPT

## 2020-04-26 PROCEDURE — 80048 BASIC METABOLIC PNL TOTAL CA: CPT | Performed by: INTERNAL MEDICINE

## 2020-04-26 PROCEDURE — 84145 PROCALCITONIN (PCT): CPT | Performed by: INTERNAL MEDICINE

## 2020-04-26 PROCEDURE — 99233 SBSQ HOSP IP/OBS HIGH 50: CPT | Performed by: INTERNAL MEDICINE

## 2020-04-26 PROCEDURE — 99232 SBSQ HOSP IP/OBS MODERATE 35: CPT | Performed by: INTERNAL MEDICINE

## 2020-04-26 RX ADMIN — PANTOPRAZOLE SODIUM 40 MG: 40 TABLET, DELAYED RELEASE ORAL at 05:20

## 2020-04-26 RX ADMIN — HEPARIN SODIUM 5000 UNITS: 5000 INJECTION INTRAVENOUS; SUBCUTANEOUS at 05:20

## 2020-04-26 RX ADMIN — MAGNESIUM GLUCONATE 500 MG ORAL TABLET 400 MG: 500 TABLET ORAL at 08:52

## 2020-04-26 RX ADMIN — Medication 1 CAPSULE: at 17:25

## 2020-04-26 RX ADMIN — INSULIN LISPRO 3 UNITS: 100 INJECTION, SOLUTION INTRAVENOUS; SUBCUTANEOUS at 13:47

## 2020-04-26 RX ADMIN — INSULIN LISPRO 3 UNITS: 100 INJECTION, SOLUTION INTRAVENOUS; SUBCUTANEOUS at 17:26

## 2020-04-26 RX ADMIN — METHENAMINE HIPPURATE 1 G: 1 TABLET ORAL at 21:25

## 2020-04-26 RX ADMIN — INSULIN LISPRO 2 UNITS: 100 INJECTION, SOLUTION INTRAVENOUS; SUBCUTANEOUS at 17:26

## 2020-04-26 RX ADMIN — TAMSULOSIN HYDROCHLORIDE 0.4 MG: 0.4 CAPSULE ORAL at 17:25

## 2020-04-26 RX ADMIN — ERTAPENEM SODIUM 500 MG: 1 INJECTION, POWDER, LYOPHILIZED, FOR SOLUTION INTRAMUSCULAR; INTRAVENOUS at 21:31

## 2020-04-26 RX ADMIN — AMLODIPINE BESYLATE 5 MG: 5 TABLET ORAL at 08:52

## 2020-04-26 RX ADMIN — MAGNESIUM GLUCONATE 500 MG ORAL TABLET 400 MG: 500 TABLET ORAL at 17:25

## 2020-04-26 RX ADMIN — METHENAMINE HIPPURATE 1 G: 1 TABLET ORAL at 08:54

## 2020-04-26 RX ADMIN — COLCHICINE 0.6 MG: 0.6 TABLET, FILM COATED ORAL at 08:52

## 2020-04-26 RX ADMIN — METHENAMINE HIPPURATE 1 G: 1 TABLET ORAL at 17:26

## 2020-04-26 RX ADMIN — HEPARIN SODIUM 5000 UNITS: 5000 INJECTION INTRAVENOUS; SUBCUTANEOUS at 13:48

## 2020-04-26 RX ADMIN — HEPARIN SODIUM 5000 UNITS: 5000 INJECTION INTRAVENOUS; SUBCUTANEOUS at 21:23

## 2020-04-26 RX ADMIN — INSULIN LISPRO 3 UNITS: 100 INJECTION, SOLUTION INTRAVENOUS; SUBCUTANEOUS at 08:53

## 2020-04-26 RX ADMIN — INSULIN DETEMIR 10 UNITS: 100 INJECTION, SOLUTION SUBCUTANEOUS at 08:53

## 2020-04-26 RX ADMIN — ASPIRIN 81 MG 81 MG: 81 TABLET ORAL at 08:52

## 2020-04-26 RX ADMIN — VANCOMYCIN HYDROCHLORIDE 125 MG: 500 INJECTION, POWDER, LYOPHILIZED, FOR SOLUTION INTRAVENOUS at 09:45

## 2020-04-26 RX ADMIN — VANCOMYCIN HYDROCHLORIDE 125 MG: 500 INJECTION, POWDER, LYOPHILIZED, FOR SOLUTION INTRAVENOUS at 21:25

## 2020-04-26 RX ADMIN — MELATONIN 1000 UNITS: at 08:53

## 2020-04-27 VITALS
HEIGHT: 68 IN | TEMPERATURE: 97.3 F | HEART RATE: 73 BPM | OXYGEN SATURATION: 98 % | BODY MASS INDEX: 36.42 KG/M2 | RESPIRATION RATE: 18 BRPM | SYSTOLIC BLOOD PRESSURE: 165 MMHG | DIASTOLIC BLOOD PRESSURE: 68 MMHG | WEIGHT: 240.3 LBS

## 2020-04-27 LAB
ANION GAP SERPL CALCULATED.3IONS-SCNC: 8 MMOL/L (ref 4–13)
BUN SERPL-MCNC: 46 MG/DL (ref 5–25)
CALCIUM SERPL-MCNC: 8.4 MG/DL (ref 8.3–10.1)
CHLORIDE SERPL-SCNC: 105 MMOL/L (ref 100–108)
CO2 SERPL-SCNC: 27 MMOL/L (ref 21–32)
CREAT SERPL-MCNC: 3.35 MG/DL (ref 0.6–1.3)
GFR SERPL CREATININE-BSD FRML MDRD: 16 ML/MIN/1.73SQ M
GLUCOSE SERPL-MCNC: 112 MG/DL (ref 65–140)
GLUCOSE SERPL-MCNC: 74 MG/DL (ref 65–140)
POTASSIUM SERPL-SCNC: 4.1 MMOL/L (ref 3.5–5.3)
SODIUM SERPL-SCNC: 140 MMOL/L (ref 136–145)

## 2020-04-27 PROCEDURE — 82948 REAGENT STRIP/BLOOD GLUCOSE: CPT

## 2020-04-27 PROCEDURE — 80048 BASIC METABOLIC PNL TOTAL CA: CPT | Performed by: INTERNAL MEDICINE

## 2020-04-27 PROCEDURE — 99232 SBSQ HOSP IP/OBS MODERATE 35: CPT | Performed by: NURSE PRACTITIONER

## 2020-04-27 PROCEDURE — 99233 SBSQ HOSP IP/OBS HIGH 50: CPT | Performed by: INTERNAL MEDICINE

## 2020-04-27 PROCEDURE — 99239 HOSP IP/OBS DSCHRG MGMT >30: CPT | Performed by: NURSE PRACTITIONER

## 2020-04-27 RX ORDER — DOXYCYCLINE 100 MG/1
100 TABLET ORAL 2 TIMES DAILY
Qty: 8 TABLET | Refills: 0 | Status: SHIPPED | OUTPATIENT
Start: 2020-04-27 | End: 2020-05-01

## 2020-04-27 RX ADMIN — PANTOPRAZOLE SODIUM 40 MG: 40 TABLET, DELAYED RELEASE ORAL at 04:42

## 2020-04-27 RX ADMIN — AMLODIPINE BESYLATE 5 MG: 5 TABLET ORAL at 08:31

## 2020-04-27 RX ADMIN — COLCHICINE 0.6 MG: 0.6 TABLET, FILM COATED ORAL at 08:31

## 2020-04-27 RX ADMIN — ASPIRIN 81 MG 81 MG: 81 TABLET ORAL at 08:31

## 2020-04-27 RX ADMIN — MELATONIN 1000 UNITS: at 08:31

## 2020-04-27 RX ADMIN — HEPARIN SODIUM 5000 UNITS: 5000 INJECTION INTRAVENOUS; SUBCUTANEOUS at 04:42

## 2020-04-27 RX ADMIN — INSULIN DETEMIR 10 UNITS: 100 INJECTION, SOLUTION SUBCUTANEOUS at 08:32

## 2020-04-27 RX ADMIN — METHENAMINE HIPPURATE 1 G: 1 TABLET ORAL at 08:33

## 2020-04-27 RX ADMIN — POTASSIUM CHLORIDE 20 MEQ: 1500 TABLET, EXTENDED RELEASE ORAL at 08:31

## 2020-04-27 RX ADMIN — MAGNESIUM GLUCONATE 500 MG ORAL TABLET 400 MG: 500 TABLET ORAL at 08:31

## 2020-04-27 RX ADMIN — VANCOMYCIN HYDROCHLORIDE 125 MG: 500 INJECTION, POWDER, LYOPHILIZED, FOR SOLUTION INTRAVENOUS at 08:31

## 2020-04-28 ENCOUNTER — OFFICE VISIT (OUTPATIENT)
Dept: HEMATOLOGY ONCOLOGY | Facility: CLINIC | Age: 80
End: 2020-04-28
Payer: COMMERCIAL

## 2020-04-28 ENCOUNTER — TELEPHONE (OUTPATIENT)
Dept: NEPHROLOGY | Facility: CLINIC | Age: 80
End: 2020-04-28

## 2020-04-28 VITALS
OXYGEN SATURATION: 98 % | HEIGHT: 68 IN | DIASTOLIC BLOOD PRESSURE: 68 MMHG | SYSTOLIC BLOOD PRESSURE: 138 MMHG | HEART RATE: 78 BPM | RESPIRATION RATE: 18 BRPM | BODY MASS INDEX: 36.54 KG/M2 | TEMPERATURE: 99.3 F

## 2020-04-28 DIAGNOSIS — E34.0 CARCINOID SYNDROME (HCC): ICD-10-CM

## 2020-04-28 DIAGNOSIS — D64.9 NORMOCYTIC ANEMIA: ICD-10-CM

## 2020-04-28 DIAGNOSIS — C7B.8 NEUROENDOCRINE CARCINOMA METASTATIC TO LIVER (HCC): Primary | ICD-10-CM

## 2020-04-28 DIAGNOSIS — C7A.8 NEUROENDOCRINE CARCINOMA METASTATIC TO LIVER (HCC): Primary | ICD-10-CM

## 2020-04-28 PROCEDURE — 99214 OFFICE O/P EST MOD 30 MIN: CPT | Performed by: NURSE PRACTITIONER

## 2020-04-29 ENCOUNTER — TELEPHONE (OUTPATIENT)
Dept: NEPHROLOGY | Facility: CLINIC | Age: 80
End: 2020-04-29

## 2020-05-01 ENCOUNTER — LAB REQUISITION (OUTPATIENT)
Dept: LAB | Facility: HOSPITAL | Age: 80
End: 2020-05-01
Payer: COMMERCIAL

## 2020-05-01 ENCOUNTER — TELEMEDICINE (OUTPATIENT)
Dept: UROLOGY | Facility: CLINIC | Age: 80
End: 2020-05-01
Payer: COMMERCIAL

## 2020-05-01 DIAGNOSIS — N20.1 URETERAL STONE: Primary | ICD-10-CM

## 2020-05-01 DIAGNOSIS — N18.4 CHRONIC KIDNEY DISEASE, STAGE 4 (SEVERE) (HCC): ICD-10-CM

## 2020-05-01 DIAGNOSIS — N17.9 ACUTE KIDNEY FAILURE, UNSPECIFIED (HCC): ICD-10-CM

## 2020-05-01 LAB
ANION GAP SERPL CALCULATED.3IONS-SCNC: 11 MMOL/L (ref 4–13)
BUN SERPL-MCNC: 39 MG/DL (ref 5–25)
CALCIUM SERPL-MCNC: 8.6 MG/DL (ref 8.3–10.1)
CHLORIDE SERPL-SCNC: 108 MMOL/L (ref 100–108)
CO2 SERPL-SCNC: 23 MMOL/L (ref 21–32)
CREAT SERPL-MCNC: 3.64 MG/DL (ref 0.6–1.3)
GFR SERPL CREATININE-BSD FRML MDRD: 15 ML/MIN/1.73SQ M
GLUCOSE SERPL-MCNC: 112 MG/DL (ref 65–140)
POTASSIUM SERPL-SCNC: 4.8 MMOL/L (ref 3.5–5.3)
SODIUM SERPL-SCNC: 142 MMOL/L (ref 136–145)

## 2020-05-01 PROCEDURE — G2012 BRIEF CHECK IN BY MD/QHP: HCPCS | Performed by: PHYSICIAN ASSISTANT

## 2020-05-01 PROCEDURE — 80048 BASIC METABOLIC PNL TOTAL CA: CPT | Performed by: NURSE PRACTITIONER

## 2020-05-06 ENCOUNTER — HOSPITAL ENCOUNTER (OUTPATIENT)
Dept: INFUSION CENTER | Facility: HOSPITAL | Age: 80
Discharge: HOME | End: 2020-05-06
Payer: COMMERCIAL

## 2020-05-06 VITALS
TEMPERATURE: 97.1 F | RESPIRATION RATE: 18 BRPM | HEART RATE: 70 BPM | OXYGEN SATURATION: 100 % | SYSTOLIC BLOOD PRESSURE: 150 MMHG | DIASTOLIC BLOOD PRESSURE: 70 MMHG

## 2020-05-06 DIAGNOSIS — C7A.8 NEUROENDOCRINE CARCINOMA METASTATIC TO LIVER (HCC): ICD-10-CM

## 2020-05-06 DIAGNOSIS — C7B.8 NEUROENDOCRINE CARCINOMA METASTATIC TO LIVER (HCC): ICD-10-CM

## 2020-05-06 DIAGNOSIS — D3A.8 NEUROENDOCRINE TUMOR: ICD-10-CM

## 2020-05-06 DIAGNOSIS — C7A.8 MALIGNANT NEUROENDOCRINE NEOPLASM (HCC): ICD-10-CM

## 2020-05-06 DIAGNOSIS — E34.0 CARCINOID SYNDROME (HCC): Primary | ICD-10-CM

## 2020-05-06 LAB — CGA SERPL-MCNC: NORMAL NG/ML

## 2020-05-06 PROCEDURE — 96372 THER/PROPH/DIAG INJ SC/IM: CPT

## 2020-05-06 RX ORDER — LANREOTIDE ACETATE 120 MG/.5ML
120 INJECTION SUBCUTANEOUS ONCE
Status: COMPLETED | OUTPATIENT
Start: 2020-05-06 | End: 2020-05-06

## 2020-05-06 RX ORDER — LANREOTIDE ACETATE 120 MG/.5ML
120 INJECTION SUBCUTANEOUS ONCE
Status: CANCELLED | OUTPATIENT
Start: 2020-01-01

## 2020-05-06 RX ADMIN — LANREOTIDE ACETATE 120 MG: 120 INJECTION SUBCUTANEOUS at 12:04

## 2020-05-07 ENCOUNTER — PREP FOR PROCEDURE (OUTPATIENT)
Dept: UROLOGY | Facility: CLINIC | Age: 80
End: 2020-05-07

## 2020-05-07 ENCOUNTER — TELEPHONE (OUTPATIENT)
Dept: UROLOGY | Facility: CLINIC | Age: 80
End: 2020-05-07

## 2020-05-07 ENCOUNTER — TELEPHONE (OUTPATIENT)
Dept: NEPHROLOGY | Facility: CLINIC | Age: 80
End: 2020-05-07

## 2020-05-07 DIAGNOSIS — N20.1 URETERAL STONE: Primary | ICD-10-CM

## 2020-05-07 DIAGNOSIS — Z11.59 SPECIAL SCREENING EXAMINATION FOR VIRAL DISEASE: ICD-10-CM

## 2020-05-07 DIAGNOSIS — Z01.818 PRE-OP TESTING: ICD-10-CM

## 2020-05-07 DIAGNOSIS — B37.89 CANDIDA RASH OF GROIN: Primary | ICD-10-CM

## 2020-05-07 DIAGNOSIS — L30.4 INTERTRIGO: Primary | ICD-10-CM

## 2020-05-07 RX ORDER — NYSTATIN 100000 [USP'U]/G
POWDER TOPICAL 2 TIMES DAILY
Qty: 15 G | Refills: 0 | Status: SHIPPED | OUTPATIENT
Start: 2020-05-07 | End: 2020-01-01 | Stop reason: HOSPADM

## 2020-05-07 RX ORDER — NYSTATIN 100000 [USP'U]/G
POWDER TOPICAL 4 TIMES DAILY
Qty: 60 G | Refills: 1 | Status: SHIPPED | OUTPATIENT
Start: 2020-05-07 | End: 2020-05-07

## 2020-05-11 ENCOUNTER — OFFICE VISIT (OUTPATIENT)
Dept: NEPHROLOGY | Facility: CLINIC | Age: 80
End: 2020-05-11
Payer: COMMERCIAL

## 2020-05-11 ENCOUNTER — ANESTHESIA EVENT (OUTPATIENT)
Dept: PERIOP | Facility: HOSPITAL | Age: 80
End: 2020-05-11
Payer: COMMERCIAL

## 2020-05-11 VITALS
DIASTOLIC BLOOD PRESSURE: 58 MMHG | TEMPERATURE: 97.2 F | SYSTOLIC BLOOD PRESSURE: 116 MMHG | HEART RATE: 50 BPM | WEIGHT: 153 LBS | OXYGEN SATURATION: 99 % | HEIGHT: 68 IN | BODY MASS INDEX: 23.19 KG/M2

## 2020-05-11 DIAGNOSIS — E43 SEVERE PROTEIN-CALORIE MALNUTRITION (HCC): ICD-10-CM

## 2020-05-11 DIAGNOSIS — Z79.4 TYPE 2 DIABETES MELLITUS WITH HYPOGLYCEMIA WITHOUT COMA, WITH LONG-TERM CURRENT USE OF INSULIN (HCC): ICD-10-CM

## 2020-05-11 DIAGNOSIS — E11.649 TYPE 2 DIABETES MELLITUS WITH HYPOGLYCEMIA WITHOUT COMA, WITH LONG-TERM CURRENT USE OF INSULIN (HCC): ICD-10-CM

## 2020-05-11 DIAGNOSIS — I10 ESSENTIAL HYPERTENSION: ICD-10-CM

## 2020-05-11 DIAGNOSIS — C7B.8 NEUROENDOCRINE CARCINOMA METASTATIC TO LIVER (HCC): Chronic | ICD-10-CM

## 2020-05-11 DIAGNOSIS — N13.9 OBSTRUCTIVE UROPATHY: ICD-10-CM

## 2020-05-11 DIAGNOSIS — N17.9 ACUTE RENAL FAILURE SUPERIMPOSED ON STAGE 4 CHRONIC KIDNEY DISEASE, UNSPECIFIED ACUTE RENAL FAILURE TYPE (HCC): Primary | ICD-10-CM

## 2020-05-11 DIAGNOSIS — Z97.8 CHRONIC INDWELLING FOLEY CATHETER: Chronic | ICD-10-CM

## 2020-05-11 DIAGNOSIS — E83.42 HYPOMAGNESEMIA: Chronic | ICD-10-CM

## 2020-05-11 DIAGNOSIS — C7A.8 NEUROENDOCRINE CARCINOMA METASTATIC TO LIVER (HCC): Chronic | ICD-10-CM

## 2020-05-11 DIAGNOSIS — N18.4 ACUTE RENAL FAILURE SUPERIMPOSED ON STAGE 4 CHRONIC KIDNEY DISEASE, UNSPECIFIED ACUTE RENAL FAILURE TYPE (HCC): Primary | ICD-10-CM

## 2020-05-11 PROCEDURE — 99213 OFFICE O/P EST LOW 20 MIN: CPT | Performed by: NURSE PRACTITIONER

## 2020-05-14 DIAGNOSIS — Z01.818 PRE-OP TESTING: ICD-10-CM

## 2020-05-14 DIAGNOSIS — Z11.59 SPECIAL SCREENING EXAMINATION FOR VIRAL DISEASE: ICD-10-CM

## 2020-05-14 DIAGNOSIS — N20.1 URETERAL STONE: ICD-10-CM

## 2020-05-14 PROCEDURE — U0003 INFECTIOUS AGENT DETECTION BY NUCLEIC ACID (DNA OR RNA); SEVERE ACUTE RESPIRATORY SYNDROME CORONAVIRUS 2 (SARS-COV-2) (CORONAVIRUS DISEASE [COVID-19]), AMPLIFIED PROBE TECHNIQUE, MAKING USE OF HIGH THROUGHPUT TECHNOLOGIES AS DESCRIBED BY CMS-2020-01-R: HCPCS

## 2020-05-15 ENCOUNTER — APPOINTMENT (OUTPATIENT)
Dept: LAB | Facility: HOSPITAL | Age: 80
End: 2020-05-15
Attending: UROLOGY
Payer: COMMERCIAL

## 2020-05-15 ENCOUNTER — TELEPHONE (OUTPATIENT)
Dept: UROLOGY | Facility: CLINIC | Age: 80
End: 2020-05-15

## 2020-05-15 DIAGNOSIS — N18.4 ACUTE RENAL FAILURE SUPERIMPOSED ON STAGE 4 CHRONIC KIDNEY DISEASE (HCC): ICD-10-CM

## 2020-05-15 DIAGNOSIS — C7B.8 NEUROENDOCRINE CARCINOMA METASTATIC TO LIVER (HCC): ICD-10-CM

## 2020-05-15 DIAGNOSIS — C7A.8 NEUROENDOCRINE CARCINOMA METASTATIC TO LIVER (HCC): ICD-10-CM

## 2020-05-15 DIAGNOSIS — Z01.818 PRE-OP TESTING: ICD-10-CM

## 2020-05-15 DIAGNOSIS — D64.9 NORMOCYTIC ANEMIA: ICD-10-CM

## 2020-05-15 DIAGNOSIS — N17.9 ACUTE RENAL FAILURE SUPERIMPOSED ON STAGE 4 CHRONIC KIDNEY DISEASE (HCC): ICD-10-CM

## 2020-05-15 DIAGNOSIS — N20.1 URETERAL STONE: ICD-10-CM

## 2020-05-15 DIAGNOSIS — N17.9 ACUTE RENAL FAILURE SUPERIMPOSED ON STAGE 4 CHRONIC KIDNEY DISEASE, UNSPECIFIED ACUTE RENAL FAILURE TYPE (HCC): ICD-10-CM

## 2020-05-15 DIAGNOSIS — N18.4 ACUTE RENAL FAILURE SUPERIMPOSED ON STAGE 4 CHRONIC KIDNEY DISEASE, UNSPECIFIED ACUTE RENAL FAILURE TYPE (HCC): ICD-10-CM

## 2020-05-15 LAB
ALBUMIN SERPL BCP-MCNC: 3.2 G/DL (ref 3.5–5)
ALP SERPL-CCNC: 151 U/L (ref 46–116)
ALT SERPL W P-5'-P-CCNC: 45 U/L (ref 12–78)
ANION GAP SERPL CALCULATED.3IONS-SCNC: 11 MMOL/L (ref 4–13)
AST SERPL W P-5'-P-CCNC: 26 U/L (ref 5–45)
BASOPHILS # BLD AUTO: 0.08 THOUSANDS/ΜL (ref 0–0.1)
BASOPHILS NFR BLD AUTO: 1 % (ref 0–1)
BILIRUB SERPL-MCNC: 0.4 MG/DL (ref 0.2–1)
BUN SERPL-MCNC: 41 MG/DL (ref 5–25)
CALCIUM SERPL-MCNC: 8.5 MG/DL (ref 8.3–10.1)
CHLORIDE SERPL-SCNC: 110 MMOL/L (ref 100–108)
CO2 SERPL-SCNC: 23 MMOL/L (ref 21–32)
CREAT SERPL-MCNC: 3.82 MG/DL (ref 0.6–1.3)
CRP SERPL QL: 11.6 MG/L
EOSINOPHIL # BLD AUTO: 0.19 THOUSAND/ΜL (ref 0–0.61)
EOSINOPHIL NFR BLD AUTO: 2 % (ref 0–6)
ERYTHROCYTE [DISTWIDTH] IN BLOOD BY AUTOMATED COUNT: 15 % (ref 11.6–15.1)
ERYTHROCYTE [SEDIMENTATION RATE] IN BLOOD: 40 MM/HOUR (ref 2–10)
FERRITIN SERPL-MCNC: 421 NG/ML (ref 8–388)
GFR SERPL CREATININE-BSD FRML MDRD: 14 ML/MIN/1.73SQ M
GLUCOSE P FAST SERPL-MCNC: 77 MG/DL (ref 65–99)
HCT VFR BLD AUTO: 33.5 % (ref 36.5–49.3)
HGB BLD-MCNC: 10.6 G/DL (ref 12–17)
IMM GRANULOCYTES # BLD AUTO: 0.04 THOUSAND/UL (ref 0–0.2)
IMM GRANULOCYTES NFR BLD AUTO: 1 % (ref 0–2)
IRON SATN MFR SERPL: 32 %
IRON SERPL-MCNC: 95 UG/DL (ref 65–175)
LYMPHOCYTES # BLD AUTO: 1.5 THOUSANDS/ΜL (ref 0.6–4.47)
LYMPHOCYTES NFR BLD AUTO: 19 % (ref 14–44)
MCH RBC QN AUTO: 29.9 PG (ref 26.8–34.3)
MCHC RBC AUTO-ENTMCNC: 31.6 G/DL (ref 31.4–37.4)
MCV RBC AUTO: 95 FL (ref 82–98)
MONOCYTES # BLD AUTO: 0.65 THOUSAND/ΜL (ref 0.17–1.22)
MONOCYTES NFR BLD AUTO: 8 % (ref 4–12)
NEUTROPHILS # BLD AUTO: 5.47 THOUSANDS/ΜL (ref 1.85–7.62)
NEUTS SEG NFR BLD AUTO: 69 % (ref 43–75)
NRBC BLD AUTO-RTO: 0 /100 WBCS
PLATELET # BLD AUTO: 192 THOUSANDS/UL (ref 149–390)
PMV BLD AUTO: 10.8 FL (ref 8.9–12.7)
POTASSIUM SERPL-SCNC: 5 MMOL/L (ref 3.5–5.3)
PROT SERPL-MCNC: 6.9 G/DL (ref 6.4–8.2)
RBC # BLD AUTO: 3.54 MILLION/UL (ref 3.88–5.62)
SARS-COV-2 RNA SPEC QL NAA+PROBE: NOT DETECTED
SODIUM SERPL-SCNC: 144 MMOL/L (ref 136–145)
TIBC SERPL-MCNC: 299 UG/DL (ref 250–450)
VIT B12 SERPL-MCNC: 1349 PG/ML (ref 100–900)
WBC # BLD AUTO: 7.93 THOUSAND/UL (ref 4.31–10.16)

## 2020-05-15 PROCEDURE — 86140 C-REACTIVE PROTEIN: CPT

## 2020-05-15 PROCEDURE — 86316 IMMUNOASSAY TUMOR OTHER: CPT

## 2020-05-15 PROCEDURE — 85025 COMPLETE CBC W/AUTO DIFF WBC: CPT

## 2020-05-15 PROCEDURE — 83550 IRON BINDING TEST: CPT

## 2020-05-15 PROCEDURE — 36415 COLL VENOUS BLD VENIPUNCTURE: CPT

## 2020-05-15 PROCEDURE — 80053 COMPREHEN METABOLIC PANEL: CPT

## 2020-05-15 PROCEDURE — 87106 FUNGI IDENTIFICATION YEAST: CPT

## 2020-05-15 PROCEDURE — 83540 ASSAY OF IRON: CPT

## 2020-05-15 PROCEDURE — 87086 URINE CULTURE/COLONY COUNT: CPT

## 2020-05-15 PROCEDURE — 82728 ASSAY OF FERRITIN: CPT

## 2020-05-15 PROCEDURE — 82607 VITAMIN B-12: CPT

## 2020-05-15 PROCEDURE — 85652 RBC SED RATE AUTOMATED: CPT

## 2020-05-17 LAB — BACTERIA UR CULT: ABNORMAL

## 2020-05-18 ENCOUNTER — TELEPHONE (OUTPATIENT)
Dept: UROLOGY | Facility: CLINIC | Age: 80
End: 2020-05-18

## 2020-05-18 DIAGNOSIS — N17.9 ACUTE KIDNEY INJURY SUPERIMPOSED ON CHRONIC KIDNEY DISEASE (HCC): Chronic | ICD-10-CM

## 2020-05-18 DIAGNOSIS — B49 FUNGEMIA: Primary | ICD-10-CM

## 2020-05-18 DIAGNOSIS — R82.79 URINE CULTURE POSITIVE: ICD-10-CM

## 2020-05-18 DIAGNOSIS — N18.9 ACUTE KIDNEY INJURY SUPERIMPOSED ON CHRONIC KIDNEY DISEASE (HCC): Chronic | ICD-10-CM

## 2020-05-18 LAB — CGA SERPL-MCNC: 1600 NG/ML (ref 0–101.8)

## 2020-05-18 RX ORDER — B COMPLEX, C NO.20/FOLIC ACID 1 MG
CAPSULE ORAL
Qty: 60 EACH | Refills: 0 | Status: SHIPPED | OUTPATIENT
Start: 2020-05-18 | End: 2020-01-01 | Stop reason: SDUPTHER

## 2020-05-18 RX ORDER — FLUCONAZOLE 200 MG/1
200 TABLET ORAL DAILY
Qty: 2 TABLET | Refills: 0 | Status: SHIPPED | OUTPATIENT
Start: 2020-05-18 | End: 2020-01-01

## 2020-06-05 NOTE — PLAN OF CARE
6530- patient assisted to get OOb but not able to take a step due to pain to right leg. Dr Chandler aware as she was at the bedside. Md will call family. Problem: Potential for Falls  Goal: Patient will remain free of falls  Description  INTERVENTIONS:  - Assess patient frequently for physical needs  -  Identify cognitive and physical deficits and behaviors that affect risk of falls    -  [place] fall precautions as indicated by assessment   - Educate patient/family on patient safety including physical limitations  - Instruct patient to call for assistance with activity based on assessment  - Modify environment to reduce risk of injury  - Consider OT/PT consult to assist with strengthening/mobility  Outcome: Progressing     Problem: Prexisting or High Potential for Compromised Skin Integrity  Goal: Skin integrity is maintained or improved  Description  INTERVENTIONS:  - Identify patients at risk for skin breakdown  - Assess and monitor skin integrity  - Assess and monitor nutrition and hydration status  - Monitor labs (i e  albumin)  - Assess for incontinence   - Turn and reposition patient  - Assist with mobility/ambulation  - Relieve pressure over bony prominences  - Avoid friction and shearing  - Provide appropriate hygiene as needed including keeping skin clean and dry  - Evaluate need for skin moisturizer/barrier cream  - Collaborate with interdisciplinary team (i e  Nutrition, Rehabilitation, etc )   - Patient/family teaching  Outcome: Progressing     Problem: PAIN - ADULT  Goal: Verbalizes/displays adequate comfort level or baseline comfort level  Description  Interventions:  - Encourage patient to monitor pain and request assistance  - Assess pain using appropriate pain scale  - Administer analgesics based on type and severity of pain and evaluate response  - Implement non-pharmacological measures as appropriate and evaluate response  - Consider cultural and social influences on pain and pain management  - Notify physician/advanced practitioner if interventions unsuccessful or patient reports new pain  Outcome: Progressing     Problem: INFECTION - ADULT  Goal: Absence or prevention of progression during hospitalization  Description  INTERVENTIONS:  - Assess and monitor for signs and symptoms of infection  - Monitor lab/diagnostic results  - Monitor all insertion sites, i e  indwelling lines, tubes, and drains  - Monitor endotracheal (as able) and nasal secretions for changes in amount and color  - [place] appropriate cooling/warming therapies per order  - Administer medications as ordered  - Instruct and encourage patient and family to use good hand hygiene technique  - Identify and instruct in appropriate isolation precautions for identified infection/condition  Outcome: Progressing     Problem: SAFETY ADULT  Goal: Maintain or return to baseline ADL function  Description  INTERVENTIONS:  -  Assess patient's ability to carry out ADLs; assess patient's baseline for ADL function and identify physical deficits which impact ability to perform ADLs (bathing, care of mouth/teeth, toileting, grooming, dressing, etc )  - Assess/evaluate cause of self-care deficits   - Assess range of motion  - Assess patient's mobility; develop plan if impaired  - Assess patient's need for assistive devices and provide as appropriate  - Encourage maximum independence but intervene and supervise when necessary  ¯ Involve family in performance of ADLs  ¯ Assess for home care needs following discharge   ¯ Request OT consult to assist with ADL evaluation and planning for discharge  ¯ Provide patient education as appropriate  Outcome: Progressing  Goal: Maintain or return mobility status to optimal level  Description  INTERVENTIONS:  - Assess patient's baseline mobility status (ambulation, transfers, stairs, etc )    - Identify cognitive and physical deficits and behaviors that affect mobility  - Identify mobility aids required to assist with transfers and/or ambulation (gait belt, sit-to-stand, lift, walker, cane, etc )  - [place] fall precautions as indicated by assessment  - Record patient progress and toleration of activity level on Mobility SBAR; progress patient to next Phase/Stage  - Instruct patient to call for assistance with activity based on assessment  - Request Rehabilitation consult to assist with strengthening/weightbearing, etc   Outcome: Progressing     Problem: DISCHARGE PLANNING  Goal: Discharge to home or other facility with appropriate resources  Description  INTERVENTIONS:  - Identify barriers to discharge w/patient and caregiver  - Arrange for needed discharge resources and transportation as appropriate  - Identify discharge learning needs (meds, wound care, etc )  - Arrange for interpretive services to assist at discharge as needed  - Refer to Case Management Department for coordinating discharge planning if the patient needs post-hospital services based on physician/advanced practitioner order or complex needs related to functional status, cognitive ability, or social support system  Outcome: Progressing     Problem: Knowledge Deficit  Goal: Patient/family/caregiver demonstrates understanding of disease process, treatment plan, medications, and discharge instructions  Description  Complete learning assessment and assess knowledge base    Interventions:  - Provide teaching at level of understanding  - Provide teaching via preferred learning methods  Outcome: Progressing     Problem: DISCHARGE PLANNING - CARE MANAGEMENT  Goal: Discharge to post-acute care or home with appropriate resources  Description  INTERVENTIONS:  - Conduct assessment to determine patient/family and health care team treatment goals, and need for post-acute services based on payer coverage, community resources, and patient preferences, and barriers to discharge  - Address psychosocial, clinical, and financial barriers to discharge as identified in assessment in conjunction with the patient/family and health care team  - Arrange appropriate level of post-acute services according to patient's   needs and preference and payer coverage in collaboration with the physician and health care team  - Communicate with and update the patient/family, physician, and health care team regarding progress on the discharge plan  - Arrange appropriate transportation to post-acute venues  Pt will need PT/OT for further recommendations  Pt was just discharged from [facility]     Outcome: Progressing

## 2020-06-18 PROBLEM — I51.9 CARDIAC DISEASE: Chronic | Status: RESOLVED | Noted: 2019-01-23 | Resolved: 2020-01-01

## 2020-06-18 PROBLEM — Z96.0 URINARY CATHETER IN PLACE: Chronic | Status: ACTIVE | Noted: 2019-06-03

## 2020-06-18 PROBLEM — R26.9 GAIT DIFFICULTY: Status: ACTIVE | Noted: 2020-01-01

## 2020-06-23 PROBLEM — I25.10 CORONARY ARTERIOSCLEROSIS: Status: ACTIVE | Noted: 2020-01-01

## 2020-06-24 PROBLEM — E87.2 METABOLIC ACIDOSIS: Status: ACTIVE | Noted: 2020-01-01

## 2020-06-24 PROBLEM — I12.9 HYPERTENSIVE KIDNEY DISEASE WITH STAGE 4 CHRONIC KIDNEY DISEASE (HCC): Status: ACTIVE | Noted: 2020-01-01

## 2020-06-24 PROBLEM — N18.4 HYPERTENSIVE KIDNEY DISEASE WITH STAGE 4 CHRONIC KIDNEY DISEASE (HCC): Status: ACTIVE | Noted: 2020-01-01

## 2020-06-24 PROBLEM — R79.89 AZOTEMIA: Status: ACTIVE | Noted: 2020-01-01

## 2020-06-30 NOTE — TELEPHONE ENCOUNTER
Pt spouse returned missed call   Spouse states pt is established with DR Rajani Dietz and will call him to schedule a fu

## 2020-07-01 NOTE — PLAN OF CARE
Problem: Knowledge Deficit  Goal: Patient/family/caregiver demonstrates understanding of disease process, treatment plan, medications, and discharge instructions  Description  Complete learning assessment and assess knowledge base  Interventions:  - Provide teaching at level of understanding  - Provide teaching via preferred learning methods  Outcome: Progressing     Problem: Potential for Falls  Goal: Patient will remain free of falls  Description  INTERVENTIONS:  - Assess patient frequently for physical needs  -  Identify cognitive and physical deficits and behaviors that affect risk of falls    -  [place] fall precautions as indicated by assessment   - Educate patient/family on patient safety including physical limitations  - Instruct patient to call for assistance with activity based on assessment  - Modify environment to reduce risk of injury  - Consider OT/PT consult to assist with strengthening/mobility  Outcome: Progressing

## 2020-07-02 NOTE — PATIENT INSTRUCTIONS
Near Syncope   AMBULATORY CARE:   Near syncope,  also called presyncope, is the feeling that you may faint (lose consciousness), but you do not  You can control some health conditions that cause near syncope  Your healthcare providers can help you create a plan to manage near syncope and prevent episodes  Signs and symptoms that may occur before a near syncope episode:   · Feeling dizzy or lightheaded    · Nausea, feeling warm, sweating, or clammy skin    · Blurred vision, or vision that is blacking out    · Confusion    · Trouble breathing    · A fast or fluttering heartbeat, chest pain, or a weak pulse  Seek care immediately if:   · You have sudden chest pain  · You have trouble breathing or shortness of breath  · You have vision changes, are sweating, and have nausea while you are sitting or lying down  · You feel dizzy or flushed and your heart is fluttering  · You lose consciousness  · You cannot use your arm, hand, foot, or leg, or it feels weak  · You have trouble speaking or understanding others when they speak  Contact your healthcare provider if:   · You have new or worsening symptoms  · Your heart beats faster or slower than usual      · You have questions or concerns about your condition or care  Manage near syncope:   · Sit or lie down when needed  This includes when you feel dizzy, your throat is getting tight, and your vision changes  Raise your legs above the level of your heart  · Take slow, deep breaths if you start to breathe faster with anxiety or fear  This can help decrease dizziness and the feeling that you might faint  · Keep a record of your near syncope episodes  Include your symptoms and your activity before and after the episode  The record can help your healthcare provider find the cause of your near syncope and help you manage episodes    Prevent a near syncope episode:   · Move slowly and let yourself get used to one position before you move to another position  This is very important when you change from a lying or sitting position to a standing position  Take some deep breaths before you stand up from a lying position  Stand up slowly  Sudden movements may cause a fainting spell  Sit on the side of the bed or couch for a few minutes before you stand up  If you are on bedrest, try to be upright for about 2 hours each day, or as directed  Do not lock your legs if you are standing for a long period of time  Move your legs and bend your knees to keep blood flowing  · Follow your healthcare provider's recommendations  Your provider may  recommend that you drink more liquids to prevent dehydration  You may also need to have more salt to keep your blood pressure from dropping too low and causing syncope  Your provider will tell you how much liquid and sodium to have each day  · Watch for signs of low blood sugar  These include hunger, nervousness, sweating, and fast or fluttery heartbeats  Talk with your healthcare provider about ways to keep your blood sugar level steady  · Check your blood pressure often  This is important if you take medicine to lower your blood pressure  Check your blood pressure when you are lying down and when you are standing  Ask how often to check during the day  Keep a record of your blood pressure numbers  Your healthcare provider may use the record to help plan your treatment  · Do not strain if you are constipated  You may faint if you strain to have a bowel movement  Walking is the best way to get your bowels moving  Eat foods high in fiber to make it easier to have a bowel movement  Good examples are high-fiber cereals, beans, vegetables, and whole-grain breads  Prune juice may help make bowel movements softer  · Do not exercise outside on a hot day  The combination of physical activity and heat can lead to dehydration  This can cause syncope  Follow up with your healthcare provider as directed:   You may need more tests to help find the cause of your near syncope episodes  The tests will help healthcare providers plan the best treatment for you  Write down your questions so you remember to ask them during your visits  © 2017 [address] Information is for End User's use only and may not be sold, redistributed or otherwise used for commercial purposes  All illustrations and images included in CareNotes® are the copyrighted property of [employer]  or [employer]  The above information is an  only  It is not intended as medical advice for individual conditions or treatments  Talk to your doctor, nurse or pharmacist before following any medical regimen to see if it is safe and effective for you  Syncope   AMBULATORY CARE:   Syncope  is also called fainting or passing out  Syncope is a sudden, temporary loss of consciousness, followed by a fall from a standing or sitting position  Syncope ranges from not serious to a sign of a more serious condition that needs to be treated  You can control some health conditions that cause syncope  Your healthcare providers can help you create a plan to manage syncope and prevent episodes  Signs and symptoms that may occur before syncope include the following:   · Cold, clammy, and sweaty skin     · Fast breathing and a racing, pounding heartbeat     · Feeling more tired than usual     · Nausea, a warm feeling, and sweating    · A headache, or feeling lightheaded or dizzy    · Tingling sensation or numbness     · Spots in front of your eyes, blurred vision, or double vision  Seek care immediately if:   · You are bleeding because you hit your head when you fainted  · You suddenly have double vision, difficulty speaking, numbness, and cannot move your arms or legs  · You have chest pain and trouble breathing  · You vomit blood or material that looks like coffee grounds  · You see blood in your bowel movement    Contact your healthcare provider if:   · You have new or worsening symptoms  · You have another syncope episode  · You have a headache, fast heartbeat, or feel too dizzy to stand up  · You have questions or concerns about your condition or care  Treatment for syncope  depends on the cause of your syncope  To prevent syncope from happening again, you may  need any of the following:  · Medicines  may be needed to treat any medical conditions that are causing your syncope  These may include medicines to help your heart pump strongly and regularly  Your healthcare provider may also make changes to any medicines that are causing syncope  · Tilt training  involves training yourself to stand for 10 to 30 minutes each day against a wall  This helps your body decrease the effects of posture changes and reduces the number of fainting spells  Manage syncope:   · Keep a record of your syncope episodes  Include your symptoms and your activity before and after the episode  The record can help your healthcare provider find the cause of your syncope and help you manage episodes  · Sit or lie down when needed  This includes when you feel dizzy, your throat is getting tight, and your vision changes  Raise your legs above the level of your heart  · Take slow, deep breaths if you start to breathe faster with anxiety or fear  This can help decrease dizziness and the feeling that you might faint  · Check your blood pressure often  This is important if you take medicine to lower your blood pressure  Check your blood pressure when you are lying down and when you are standing  Ask how often to check during the day  Keep a record of your blood pressure numbers  Your healthcare provider may use the record to help plan your treatment  Prevent a syncope episode:   · Move slowly and let yourself get used to one position before you move to another position    This is very important when you change from a lying or sitting position to a standing position  Take some deep breaths before you stand up from a lying position  Stand up slowly  Sudden movements may cause a fainting spell  Sit on the side of the bed or couch for a few minutes before you stand up  If you are on bedrest, try to be upright for about 2 hours each day, or as directed  Do not lock your legs if you are standing for a long period of time  Move your legs and bend your knees to keep blood flowing  · Follow your healthcare provider's recommendations  Your provider may  recommend that you drink more liquids to prevent dehydration  You may also need to have more salt to keep your blood pressure from dropping too low and causing syncope  Your provider will tell you how much liquid and sodium to have each day  · Watch for signs of low blood sugar  These include hunger, nervousness, sweating, and fast or fluttery heartbeats  Talk with your healthcare provider about ways to keep your blood sugar level steady  · Do not strain if you are constipated  You may faint if you strain to have a bowel movement  Walking is the best way to get your bowels moving  Eat foods high in fiber to make it easier to have a bowel movement  Good examples are high-fiber cereals, beans, vegetables, and whole-grain breads  Prune juice may help make bowel movements softer  · Be careful in hot weather  Heat can cause a syncope episode  Limit activity done outside on hot days  Physical activity in hot weather can lead to dehydration  This can cause an episode  Follow up with your healthcare provider as directed:  Write down your questions so you remember to ask them during your visits  © 2017 [address] Information is for End User's use only and may not be sold, redistributed or otherwise used for commercial purposes  All illustrations and images included in CareNotes® are the copyrighted property of [employer]  or [employer]    The above information is an  only  It is not intended as medical advice for individual conditions or treatments  Talk to your doctor, nurse or pharmacist before following any medical regimen to see if it is safe and effective for you

## 2020-07-02 NOTE — PROGRESS NOTES
Transition of Care  Follow-up After Hospitalization    Janes Jose 79 y o  male   Date:  7/2/2020    TCM Call (since 6/1/2020)     Date and time call was made  6/26/2020  9:36 AM    Hospital care reviewed  Records reviewed    Patient was hospitialized at  [facility]    Date of Admission  06/23/20    Date of discharge  06/26/20    Diagnosis  Syncope and collapse    Disposition  Home      TCM Call (since 6/1/2020)     Scheduled for follow up? Yes    Did you obtain your prescribed medications  Yes    Do you need help managing your prescriptions or medications  No    Is transportation to your appointment needed  No    I have advised the patient to call PCP with any new or worsening symptoms  Eudelia Mass Andrukaitis RMA     Living Arrangements  Spouse or Significiant other    Are you recieving any outpatient services  No    Are you recieving home care services  Yes    Types of home care services  Home health aid; Nurse visit    Are you using any community resources  No    Current waiver services  No    Have you fallen in the last 12 months  No    Interperter language line needed  No    Counseling  Patient        Admit Date: 6/23/2020  Discharge Date: 6/26/2020  Diagnosis:  Syncope  Location:  [facility] records were reviewed  Medications upon discharge reviewed/updated  Medication Changes:  Start metoprolol 12 5 mg b i d , sodium bicarb 1300 mg t i d , insulin lispro 4 mg t i d , colchicine 0 3 mg daily  Imaging:  CT head showed no acute intracranial abnormality  Microangiopathic changes  Consults:  Nephrology, Cardiology, nutrition  Discharge Disposition:  Home  Follow up visits with other specialists:  Nephrology, Hematology-Oncology      Assessment and Plan:    Christina Hickman was seen today for transition of care management      Diagnoses and all orders for this visit:    Encounter for support and coordination of transition of care    Syncope, unspecified syncope type    Gait difficulty            HPI:  Nallely Blade present for TCM visit s/p hospitalization for syncope  ROS: Review of Systems   Constitutional: Negative  HENT: Negative  Eyes: Negative  Respiratory: Negative  Cardiovascular: Negative  Gastrointestinal: Negative  Heartburn   Endocrine: Negative  Genitourinary: Negative  Musculoskeletal: Positive for gait problem  Skin: Negative  Allergic/Immunologic: Negative  Hematological: Negative  Psychiatric/Behavioral: Negative  Past Medical History:   Diagnosis Date    Acute pancreatitis without infection or necrosis 9/26/2019    Anemia of chronic renal failure     BPH (benign prostatic hyperplasia)     Cancer (HCC)     liver cancer    Cardiac disease     Chronic kidney disease, stage 3 (HCC)     Coronary artery disease     Diabetes mellitus ([facility] )     DJD (degenerative joint disease)     Essential hypertension     Gait disturbance     Generalized weakness     GERD (gastroesophageal reflux disease)     Gout     History of transfusion     Hydronephrosis     Hyperlipidemia     Hypertension     [facility] )     Pressure injury of skin     Proteinuria     Renal disorder     Retention of urine     Thrombophlebitis migrans     Type 2 diabetes mellitus ([facility] )     Type 2 diabetes 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, with long-term current use of insulin ([facility] ) 1/23/2019       Past Surgical History:   Procedure Laterality Date    CHOLECYSTECTOMY      CHOLECYSTECTOMY LAPAROSCOPIC N/A 2/7/2020    Procedure: CHOLECYSTECTOMY LAPAROSCOPIC;  Surgeon:  Darwin Solomon MD;  Location: [address] [place] [address] OR;  Service: General    CORONARY ANGIOPLASTY WITH STENT PLACEMENT      FL RETROGRADE PYELOGRAM  5/21/2020    IR IMAGE GUIDED BIOPSY/ASPIRATION LIVER  1/24/2019    IR TUBE PLACEMENT ROQUE  9/6/2019    MO CYSTO/URETERO W/LITHOTRIPSY &INDWELL STENT INSRT Left 5/21/2020    Procedure: CYSTOSCOPY URETEROSCOPY WITH LITHOTRIPSY HOLMIUM LASER, RETROGRADE PYELOGRAM AND INSERTION STENT URETERAL;  Surgeon: Madai Kirk MD;  Location: [facility];  Service: Urology    [place] CYSTOURETHROSCOPY,URETER CATHETER Left 4/21/2020    Procedure: CYSTOSCOPY WITH INSERTION STENT URETERAL;  Surgeon: Tobias Mcclendon MD;  [facility];  Service: Urology       Social History     Socioeconomic History    Marital status:       Spouse name: None    Number of children: None    Years of education: None    Highest education level: None   Occupational History    None   Social Needs    Financial resource strain: None    Food insecurity:     Worry: None     Inability: None    Transportation needs:     Medical: None     Non-medical: None   Tobacco Use    Smoking status: Never Smoker    Smokeless tobacco: Never Used   Substance and Sexual Activity    Alcohol use: Not Currently     Alcohol/week: 0 0 standard drinks     Frequency: Never    Drug use: Never    Sexual activity: Not Currently   Lifestyle    Physical activity:     Days per week: None     Minutes per session: None    Stress: None   Relationships    Social connections:     Talks on phone: None     Gets together: None     Attends [employer] service: None     Active member of club or organization: None     Attends meetings of clubs or organizations: None     Relationship status: None    Intimate partner violence:     Fear of current or ex partner: None     Emotionally abused: None     Physically abused: None     Forced sexual activity: None   Other Topics Concern    None   Social History Narrative    None       Family History   Problem Relation Age of Onset    Cancer Mother     Hypertension Father     Cancer Brother     Cancer Maternal Grandmother     Cancer Paternal Aunt        No Known Allergies      Current Outpatient Medications:     acetaminophen (TYLENOL) 325 mg tablet, Take 650 mg by mouth every 6 (six) hours as needed , Disp: , Rfl:     amLODIPine (NORVASC) 10 mg tablet, Take 1 tablet (10 mg total) by mouth daily, Disp: 90 tablet, Rfl: 3    aspirin 81 MG tablet, Take 81 mg by mouth daily, Disp: , Rfl:     B Complex-C-Folic Acid (TRIPHROCAPS) 1 MG CAPS, TAKE ONE CAPSULE BY MOUTH EVERY DAY WITH DINNER, Disp: 60 each, Rfl: 0    BD INSULIN SYRINGE U/F 30G X 1/2" 0 3 ML MISC, , Disp: , Rfl:     cholecalciferol (VITAMIN D3) 1,000 units tablet, Take 1,000 Units by mouth daily, Disp: , Rfl:     colchicine (COLCRYS) 0 6 mg tablet, Take 0 5 tablets (0 3 mg total) by mouth daily (Patient taking differently: Take 0 6 mg by mouth daily ), Disp: 15 tablet, Rfl: 2    docusate sodium (COLACE) 100 mg capsule, Take 1 capsule (100 mg total) by mouth 2 (two) times a day for 15 days, Disp: 30 capsule, Rfl: 0    insulin detemir (LEVEMIR) 100 units/mL subcutaneous injection, Inject 10 Units under the skin daily before breakfast, Disp: , Rfl:     insulin lispro (HumaLOG) 100 units/mL injection, Inject 4 Units under the skin 3 (three) times a day before meals, Disp: , Rfl: 0    Insulin Syringe-Needle U-100 30G X 1/2" 1 ML MISC, by Does not apply route 3 (three) times a day, Disp: 100 each, Rfl: 5    lidocaine (LIDODERM) 5 %, Apply 1 patch topically daily Remove & Discard patch within 12 hours or as directed by MD (Patient not taking: Reported on 6/18/2020), Disp: 30 patch, Rfl: 0    magnesium oxide (MAG-OX) 400 mg, Take 1 tablet (400 mg total) by mouth 2 (two) times a day, Disp: 60 tablet, Rfl: 5    metoprolol tartrate (LOPRESSOR) 25 mg tablet, Take 0 5 tablets (12 5 mg total) by mouth every 12 (twelve) hours, Disp: 30 tablet, Rfl: 0    nystatin (MYCOSTATIN) powder, Apply topically 2 (two) times a day, Disp: 15 g, Rfl: 0    pantoprazole (PROTONIX) 40 mg tablet, Take 1 tablet (40 mg total) by mouth daily in the early morning, Disp: 90 tablet, Rfl: 3    simethicone (MYLICON) 80 mg chewable tablet, Chew 1 tablet (80 mg total) every 6 (six) hours as needed for flatulence, Disp: 90 tablet, Rfl: 5    sodium bicarbonate 650 mg tablet, Take 2 tablets (1,300 mg total) by mouth 3 (three) times daily after meals, Disp: 180 tablet, Rfl: 0    tamsulosin (FLOMAX) 0 4 mg, Take 1 capsule (0 4 mg total) by mouth daily with dinner, Disp: 90 capsule, Rfl: 3      Physical Exam:  /70   Pulse 78   Temp 98 1 °F (36 7 °C)   Ht 5' 8" (1 727 m)   Wt 65 3 kg (144 lb)   SpO2 98%   BMI 21 90 kg/m²     Physical Exam   Constitutional: He is oriented to person, place, and time  He appears well-developed and well-nourished  HENT:   Head: Normocephalic and atraumatic  Right Ear: Tympanic membrane, external ear and ear canal normal    Left Ear: Tympanic membrane, external ear and ear canal normal    Nose: Nose normal    Mouth/Throat: Uvula is midline, oropharynx is clear and moist and mucous membranes are normal    Eyes: Pupils are equal, round, and reactive to light  Conjunctivae, EOM and lids are normal    Neck: Trachea normal, normal range of motion, full passive range of motion without pain and phonation normal  Neck supple  No JVD present  No thyroid mass and no thyromegaly present  Cardiovascular: Normal rate, regular rhythm, S1 normal, S2 normal, normal heart sounds, intact distal pulses and normal pulses  Exam reveals no gallop and no friction rub  No murmur heard  Pulmonary/Chest: Effort normal and breath sounds normal  He has no decreased breath sounds  Abdominal: Soft  Normal appearance, normal aorta and bowel sounds are normal  There is no hepatosplenomegaly  There is no tenderness  Genitourinary:   Genitourinary Comments: Deferred   Musculoskeletal: Normal range of motion  Neurological: He is alert and oriented to person, place, and time  He has normal strength  No cranial nerve deficit  Skin: Skin is warm, dry and intact  Capillary refill takes less than 2 seconds  Psychiatric: He has a normal mood and affect   His speech is normal and behavior is normal  Judgment and thought content normal  Cognition and memory are normal    Nursing note and vitals reviewed            Labs:  Lab Results   Component Value Date    WBC 7 68 06/26/2020    HGB 8 7 (L) 06/26/2020    HCT 26 6 (L) 06/26/2020    MCV 91 06/26/2020     (L) 06/26/2020     Lab Results   Component Value Date    K 4 3 07/01/2020     (H) 07/01/2020    CO2 18 (L) 07/01/2020    BUN 58 (H) 07/01/2020    CREATININE 3 58 (H) 07/01/2020    GLUF 70 07/01/2020    CALCIUM 8 5 07/01/2020    AST 14 06/24/2020    ALT 21 06/24/2020    ALKPHOS 119 (H) 06/24/2020    EGFR 15 07/01/2020

## 2020-07-12 NOTE — TELEPHONE ENCOUNTER
[facility]:  [phone] / Gloria BAILON) / Devorah Alejandro / 3- / Gloria Thurston from [address] [place] [address] called stating the patient is experiencing fluid retention and he has bilateral swelling in his extremities  She states this is new for the patient and would like to update the doctor  Please call Gloria Thurston back

## 2020-07-13 PROBLEM — R60.9 EDEMA: Status: ACTIVE | Noted: 2020-01-01

## 2020-07-13 PROBLEM — N17.9 AKI (ACUTE KIDNEY INJURY) (HCC): Status: RESOLVED | Noted: 2020-02-25 | Resolved: 2020-01-01

## 2020-07-13 PROBLEM — N18.4 STAGE 4 CHRONIC KIDNEY DISEASE (HCC): Status: ACTIVE | Noted: 2020-01-01

## 2020-07-13 PROBLEM — R78.81 BACTEREMIA DUE TO GRAM-NEGATIVE BACTERIA: Status: RESOLVED | Noted: 2019-04-29 | Resolved: 2020-01-01

## 2020-07-13 NOTE — ASSESSMENT & PLAN NOTE
Patient's renal function has been lower than usual   His creatinine in the past has been around 2 5 mg/dL, however, more recently closer to 3 2 - 3 4 mg/dL

## 2020-07-13 NOTE — PROGRESS NOTES
[facility] [place], [place]    Name: Elsy Razo  YOB: 1940      Assessment/Plan:    Stage 4 chronic kidney disease ([facility] )  Patient's renal function has been lower than usual   His creatinine in the past has been around 2 5 mg/dL, however, more recently closer to 3 2 - 3 4 mg/dL  Hypertensive kidney disease with stage 4 chronic kidney disease (HCC)  Continue current medications, blood pressures controlled  Metabolic acidosis    Continue with sodium bicarbonate supplementations, currently on 1300 mg 3 times a day  Recurrent UTI    Patient has been discontinued on methenamine hippurate, will defer re-initiation at this time  If the patient continues to have recurring urinary tract infections especially once that place him at risk for sepsis, would reintroduce this medication for his benefit  Edema    Will start the patient on furosemide 40 mg once daily, he will take this as needed for swelling  Type 2 diabetes mellitus with hypoglycemia without coma, with long-term current use of insulin ([facility] )    Patient has been experiencing hypoglycemic events in the morning  I asked that he take the Levemir in the morning and not in the evening  They will reinitiate 10 units subcutaneously daily in the morning, if morning blood sugars remain below 75 mg/ dL, they will reduce the dose of Levemir down to 5 units once daily  Lab Results   Component Value Date    HGBA1C 7 5 (A) 06/18/2020          Problem List Items Addressed This Visit        Endocrine    Type 2 diabetes mellitus with hypoglycemia without coma, with long-term current use of insulin ([facility] )       Patient has been experiencing hypoglycemic events in the morning  I asked that he take the Levemir in the morning and not in the evening    They will reinitiate 10 units subcutaneously daily in the morning, if morning blood sugars remain below 75 mg/ dL, they will reduce the dose of Levemir down to 5 units once daily  Lab Results   Component Value Date    HGBA1C 7 5 (A) 06/18/2020               Musculoskeletal and Integument    Hyperparathyroid bone disease ([facility] )       Genitourinary    Recurrent UTI       Patient has been discontinued on methenamine hippurate, will defer re-initiation at this time  If the patient continues to have recurring urinary tract infections especially once that place him at risk for sepsis, would reintroduce this medication for his benefit  Hypertensive kidney disease with stage 4 chronic kidney disease (HCC)     Continue current medications, blood pressures controlled  Relevant Medications    furosemide (LASIX) 40 mg tablet    Stage 4 chronic kidney disease ([facility] ) - Primary     Patient's renal function has been lower than usual   His creatinine in the past has been around 2 5 mg/dL, however, more recently closer to 3 2 - 3 4 mg/dL  Relevant Medications    furosemide (LASIX) 40 mg tablet    Other Relevant Orders    Comprehensive metabolic panel       Other    Urinary catheter in place (Chronic)    Hypomagnesemia (Chronic)    Severe protein-calorie malnutrition (HCC)    Metabolic acidosis       Continue with sodium bicarbonate supplementations, currently on 1300 mg 3 times a day  Edema       Will start the patient on furosemide 40 mg once daily, he will take this as needed for swelling  Relevant Medications    furosemide (LASIX) 40 mg tablet      Other Visit Diagnoses     Secondary hyperparathyroidism of renal origin ([facility] )        Relevant Orders    PTH, intact            Subjective:      Patient ID: Twin Jacob is a 79 y o  male  Patient is doing well s/p hospitalization  He has increase LE edema, improved with propping up legs  Hypertension   This is a chronic problem  The current episode started more than 1 year ago  The problem is unchanged  The problem is controlled  Associated symptoms include peripheral edema  Pertinent negatives include no chest pain or orthopnea  There are no associated agents to hypertension  Risk factors for coronary artery disease include male gender  Past treatments include calcium channel blockers and beta blockers  There are no compliance problems  Hypertensive end-organ damage includes kidney disease  Identifiable causes of hypertension include chronic renal disease  The following portions of the patient's history were reviewed and updated as appropriate: allergies, current medications, past family history, past medical history, past social history, past surgical history and problem list     Review of Systems   Cardiovascular: Negative for chest pain and orthopnea  All other systems reviewed and are negative  Social History     Socioeconomic History    Marital status:       Spouse name: None    Number of children: None    Years of education: None    Highest education level: None   Occupational History    None   Social Needs    Financial resource strain: None    Food insecurity:     Worry: None     Inability: None    Transportation needs:     Medical: None     Non-medical: None   Tobacco Use    Smoking status: Never Smoker    Smokeless tobacco: Never Used   Substance and Sexual Activity    Alcohol use: Not Currently     Alcohol/week: 0 0 standard drinks     Frequency: Never    Drug use: Never    Sexual activity: Not Currently   Lifestyle    Physical activity:     Days per week: None     Minutes per session: None    Stress: None   Relationships    Social connections:     Talks on phone: None     Gets together: None     Attends [employer] service: None     Active member of club or organization: None     Attends meetings of clubs or organizations: None     Relationship status: None    Intimate partner violence:     Fear of current or ex partner: None     Emotionally abused: None     Physically abused: None     Forced sexual activity: None   Other Topics Concern  None   Social History Narrative    None     Past Medical History:   Diagnosis Date    Acute pancreatitis without infection or necrosis 9/26/2019    Anemia of chronic renal failure     BPH (benign prostatic hyperplasia)     Cancer (HCC)     liver cancer    Cardiac disease     Chronic kidney disease, stage 3 (HCC)     Coronary artery disease     Diabetes mellitus ([facility] )     DJD (degenerative joint disease)     Essential hypertension     Gait disturbance     Generalized weakness     GERD (gastroesophageal reflux disease)     Gout     History of transfusion     Hydronephrosis     Hyperlipidemia     Hypertension     Liver cancer ([facility] )     Pressure injury of skin     Proteinuria     Renal disorder     Retention of urine     Thrombophlebitis migrans     Type 2 diabetes mellitus (HCC)     Type 2 diabetes 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, with long-term current use of insulin ([facility] ) 1/23/2019     Past Surgical History:   Procedure Laterality Date    CHOLECYSTECTOMY      CHOLECYSTECTOMY LAPAROSCOPIC N/A 2/7/2020    Procedure: CHOLECYSTECTOMY LAPAROSCOPIC;  Surgeon:  Elvis Keith MD;  [facility] MAIN OR;  Service: General    CORONARY ANGIOPLASTY WITH STENT PLACEMENT      FL RETROGRADE PYELOGRAM  5/21/2020    IR IMAGE GUIDED BIOPSY/ASPIRATION LIVER  1/24/2019    IR TUBE PLACEMENT ROQUE  9/6/2019    [place] CYSTO/URETERO W/LITHOTRIPSY &INDWELL STENT INSRT Left 5/21/2020    Procedure: CYSTOSCOPY URETEROSCOPY WITH LITHOTRIPSY HOLMIUM LASER, RETROGRADE PYELOGRAM AND INSERTION STENT URETERAL;  Surgeon: Tamar Lang MD;  [facility];  Service: Urology    NM CYSTOURETHROSCOPY,URETER CATHETER Left 4/21/2020    Procedure: CYSTOSCOPY WITH INSERTION STENT URETERAL;  Surgeon: Carley Bower MD;  [facility];  Service: Urology       Current Outpatient Medications:     acetaminophen (TYLENOL) 325 mg tablet, Take 650 mg by mouth every 6 (six) hours as needed , Disp: , Rfl:     amLODIPine (NORVASC) 10 mg tablet, Take 1 tablet (10 mg total) by mouth daily, Disp: 90 tablet, Rfl: 3    aspirin 81 MG tablet, Take 81 mg by mouth daily, Disp: , Rfl:     B Complex-C-Folic Acid (TRIPHROCAPS) 1 MG CAPS, TAKE ONE CAPSULE BY MOUTH EVERY DAY WITH DINNER, Disp: 60 each, Rfl: 0    BD INSULIN SYRINGE U/F 30G X 1/2" 0 3 ML MISC, , Disp: , Rfl:     cholecalciferol (VITAMIN D3) 1,000 units tablet, Take 1,000 Units by mouth daily, Disp: , Rfl:     colchicine (COLCRYS) 0 6 mg tablet, Take 0 5 tablets (0 3 mg total) by mouth daily (Patient taking differently: Take 0 6 mg by mouth daily ), Disp: 15 tablet, Rfl: 2    insulin detemir (LEVEMIR) 100 units/mL subcutaneous injection, Inject 10 Units under the skin daily before breakfast, Disp: , Rfl:     insulin lispro (HumaLOG) 100 units/mL injection, Inject 4 Units under the skin 3 (three) times a day before meals, Disp: , Rfl: 0    Insulin Syringe-Needle U-100 30G X 1/2" 1 ML MISC, by Does not apply route 3 (three) times a day, Disp: 100 each, Rfl: 5    magnesium oxide (MAG-OX) 400 mg, Take 1 tablet (400 mg total) by mouth 2 (two) times a day, Disp: 60 tablet, Rfl: 5    metoprolol tartrate (LOPRESSOR) 25 mg tablet, Take 0 5 tablets (12 5 mg total) by mouth every 12 (twelve) hours, Disp: 30 tablet, Rfl: 0    nystatin (MYCOSTATIN) powder, Apply topically 2 (two) times a day, Disp: 15 g, Rfl: 0    pantoprazole (PROTONIX) 40 mg tablet, Take 1 tablet (40 mg total) by mouth daily in the early morning, Disp: 90 tablet, Rfl: 3    sodium bicarbonate 650 mg tablet, Take 2 tablets (1,300 mg total) by mouth 3 (three) times daily after meals, Disp: 180 tablet, Rfl: 0    tamsulosin (FLOMAX) 0 4 mg, Take 1 capsule (0 4 mg total) by mouth daily with dinner, Disp: 90 capsule, Rfl: 3    furosemide (LASIX) 40 mg tablet, Take 1 tablet (40 mg total) by mouth daily , do NOT take if you do not have swelling, Disp: 30 tablet, Rfl: 5     Lab Results   Component Value Date    SODIUM 140 07/01/2020    K 4 3 07/01/2020     (H) 07/01/2020    CO2 18 (L) 07/01/2020    AGAP 12 07/01/2020    BUN 58 (H) 07/01/2020    CREATININE 3 58 (H) 07/01/2020    GLUC 103 06/26/2020    GLUF 70 07/01/2020    CALCIUM 8 5 07/01/2020    AST 14 06/24/2020    ALT 21 06/24/2020    ALKPHOS 119 (H) 06/24/2020    TP 6 4 06/24/2020    TBILI 0 40 06/24/2020    EGFR 15 07/01/2020     Lab Results   Component Value Date    WBC 7 68 06/26/2020    HGB 8 7 (L) 06/26/2020    HCT 26 6 (L) 06/26/2020    MCV 91 06/26/2020     (L) 06/26/2020     Lab Results   Component Value Date    CHOLESTEROL 164 07/16/2018    CHOLESTEROL 188 06/24/2017     No results found for: HDL  No results found for: LDLCALC  No results found for: TRIG  No results found for: CHOLHDL  Lab Results   Component Value Date    [id number] 1 293 06/26/2020           Objective:      /64   Pulse 76   Temp 98 1 °F (36 7 °C)   Ht 5' 8" (1 727 m)   Wt 76 2 kg (168 lb)   SpO2 95%   BMI 25 54 kg/m²          Physical Exam   Constitutional: He is oriented to person, place, and time  He appears well-developed and well-nourished  No distress  HENT:   Head: Normocephalic and atraumatic  Eyes: Conjunctivae are normal    Neck: Neck supple  Cardiovascular: Normal rate and regular rhythm  Pulmonary/Chest: Effort normal and breath sounds normal    Abdominal: Soft  Musculoskeletal: He exhibits no edema  Neurological: He is alert and oriented to person, place, and time  No cranial nerve deficit  Skin: Skin is warm  No rash noted  Psychiatric: He has a normal mood and affect   His behavior is normal

## 2020-07-13 NOTE — ASSESSMENT & PLAN NOTE
Patient has been discontinued on methenamine hippurate, will defer re-initiation at this time  If the patient continues to have recurring urinary tract infections especially once that place him at risk for sepsis, would reintroduce this medication for his benefit

## 2020-07-13 NOTE — ASSESSMENT & PLAN NOTE
Patient has been experiencing hypoglycemic events in the morning  I asked that he take the Levemir in the morning and not in the evening  They will reinitiate 10 units subcutaneously daily in the morning, if morning blood sugars remain below 75 mg/ dL, they will reduce the dose of Levemir down to 5 units once daily      Lab Results   Component Value Date    HGBA1C 7 5 (A) 06/18/2020

## 2020-07-16 NOTE — TELEPHONE ENCOUNTER
Result discuss with patient   Advise to increase lopressor 25mg bid and follow up in cardiology clinic as per schedule

## 2020-07-16 NOTE — TELEPHONE ENCOUNTER
----- Message from Ann Moran MD sent at 7/16/2020  9:46 AM EDT -----  Cardiac event monitoring:   Total days monitored 5     Event monitoring showed predominantly sinus rhythm  Rare PACs and PVCs  One episode of paroxysmal SVT which appears to be atrial tachycardia maximum of 11 beats at rate of 112 beats per minute  PVC burden less than 1%, PAC burden 1%  No significant pause, atrial fibrillation, atrial flutter or ventricular tachycardia  Patient did not report any symptoms while wearing event monitor

## 2020-07-23 NOTE — PROGRESS NOTES
Patient ID: Cristy Del Valle is a 79 y o  male  Plan:      Coronary artery disease  Prior stenting  No recent symptoms  Syncope and collapse  Occurred in the setting relative weight loss, perhaps dehydration, and post defecation  Hypertensive kidney disease with stage 4 chronic kidney disease ([address] )  Reasonably well controlled  Follow up Plan:  1 year EKG and follow-up visit  HPI:  Patient is seen in follow-up regarding the above  He was recently hospitalized or syncope  This occurred when he woke up in the middle the night to go to the bathroom  After defecating he suddenly passed out while still on the commode chair  There has been no recurrence  No recent chest pain or chest pressure  No other episodes of syncope  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  Most recent or relevant cardiac/vascular testing:    Event recorder 07/02/2020:  Sinus rhythm  Very brief supraventricular tachycardia totaling 11 beats  Echo 06/23/2020:  EF normal       Past Surgical History:   Procedure Laterality Date    CHOLECYSTECTOMY      CHOLECYSTECTOMY LAPAROSCOPIC N/A 2/7/2020    Procedure: CHOLECYSTECTOMY LAPAROSCOPIC;  Surgeon:  Kirt Burns MD;  Location: [facility] MAIN OR;  Service: General    CORONARY ANGIOPLASTY WITH STENT PLACEMENT      FL RETROGRADE PYELOGRAM  5/21/2020    IR IMAGE GUIDED BIOPSY/ASPIRATION LIVER  1/24/2019    IR TUBE PLACEMENT ROQUE  9/6/2019    IN CYSTO/URETERO W/LITHOTRIPSY &INDWELL STENT INSRT Left 5/21/2020    Procedure: CYSTOSCOPY URETEROSCOPY WITH LITHOTRIPSY HOLMIUM LASER, RETROGRADE PYELOGRAM AND INSERTION STENT URETERAL;  Surgeon: Chance Jimenez MD;  [facility]: [facility];  Service: Urology    IN CYSTOURETHROSCOPY,URETER CATHETER Left 4/21/2020    Procedure: CYSTOSCOPY WITH INSERTION STENT URETERAL;  Surgeon: Ada Hudson MD;  Location: [facility];  Service: Urology     CMP:   Lab Results   Component Value Date    K 4 3 07/01/2020  (H) 07/01/2020    CO2 18 (L) 07/01/2020    BUN 58 (H) 07/01/2020    CREATININE 3 58 (H) 07/01/2020    EGFR 15 07/01/2020       Lipid Profile: No results found for: CHOL, TRIG, HDL, LDL      Review of Systems   10  point ROS  was otherwise non pertinent or negative except as per HPI or as below  Gait:  Fair  Lots of weight loss but he is finally regaining some weight  Objective:     /76   Pulse 60   Ht 5' 8" (1 727 m)   Wt 75 3 kg (166 lb)   BMI 25 24 kg/m²     PHYSICAL EXAM:    General:  Normal appearance in no distress  Eyes:  Anicteric  Oral mucosa:  Moist   Neck:  No JVD  Carotid upstrokes are brisk without bruits  No masses  Chest:  Clear to auscultation and percussion  Cardiac:  Normal PMI  Normal S1 and S2  No murmur gallop or rub  Abdomen:  Soft and nontender  No palpable organomegaly or aortic enlargement  Extremities:  No peripheral edema  Musculoskeletal:  Symmetric  Vascular:  Femoral pulses are brisk without bruits  Popliteal pulses are intact bilaterally  Pedal pulses are intact  Neuro:  Grossly symmetric  Psych:  Alert and oriented x3          Current Outpatient Medications:     acetaminophen (TYLENOL) 325 mg tablet, Take 650 mg by mouth every 6 (six) hours as needed , Disp: , Rfl:     amLODIPine (NORVASC) 10 mg tablet, Take 1 tablet (10 mg total) by mouth daily, Disp: 90 tablet, Rfl: 3    aspirin 81 MG tablet, Take 81 mg by mouth daily, Disp: , Rfl:     B Complex-C-Folic Acid (TRIPHROCAPS) 1 MG CAPS, Take 1 capsule (1 mg total) by mouth daily with dinner, Disp: 30 each, Rfl: 5    BD INSULIN SYRINGE U/F 30G X 1/2" 0 3 ML MISC, , Disp: , Rfl:     cholecalciferol (VITAMIN D3) 1,000 units tablet, Take 1,000 Units by mouth daily, Disp: , Rfl:     colchicine (COLCRYS) 0 6 mg tablet, Take 0 5 tablets (0 3 mg total) by mouth daily (Patient taking differently: Take 0 6 mg by mouth daily ), Disp: 15 tablet, Rfl: 2    furosemide (LASIX) 40 mg tablet, Take 1 tablet (40 mg total) by mouth daily , do NOT take if you do not have swelling, Disp: 30 tablet, Rfl: 5    insulin detemir (LEVEMIR) 100 units/mL subcutaneous injection, Inject 10 Units under the skin daily before breakfast, Disp: , Rfl:     insulin lispro (HumaLOG) 100 units/mL injection, Inject 4 Units under the skin 3 (three) times a day before meals, Disp: , Rfl: 0    Insulin Syringe-Needle U-100 30G X 1/2" 1 ML MISC, by Does not apply route 3 (three) times a day, Disp: 100 each, Rfl: 5    magnesium oxide (MAG-OX) 400 mg, Take 1 tablet (400 mg total) by mouth 2 (two) times a day, Disp: 60 tablet, Rfl: 5    metoprolol tartrate (LOPRESSOR) 25 mg tablet, Take 0 5 tablets (12 5 mg total) by mouth every 12 (twelve) hours, Disp: 30 tablet, Rfl: 0    nystatin (MYCOSTATIN) powder, Apply topically 2 (two) times a day, Disp: 15 g, Rfl: 0    pantoprazole (PROTONIX) 40 mg tablet, Take 1 tablet (40 mg total) by mouth daily in the early morning, Disp: 90 tablet, Rfl: 3    sodium bicarbonate 650 mg tablet, Take 2 tablets (1,300 mg total) by mouth 3 (three) times daily after meals, Disp: 180 tablet, Rfl: 0    tamsulosin (FLOMAX) 0 4 mg, Take 1 capsule (0 4 mg total) by mouth daily with dinner, Disp: 90 capsule, Rfl: 3  No Known Allergies  Past Medical History:   Diagnosis Date    Acute pancreatitis without infection or necrosis 9/26/2019    Anemia of chronic renal failure     BPH (benign prostatic hyperplasia)     Chronic kidney disease (CKD), stage IV (severe)     Coronary artery disease     DJD (degenerative joint disease)     Essential hypertension     Gait disturbance     Generalized weakness     GERD (gastroesophageal reflux disease)     Gout     History of transfusion     Hydronephrosis     Hyperlipidemia     Hypertension     Liver cancer     Pressure injury of skin     Proteinuria     Renal disorder     Retention of urine     Thrombophlebitis migrans     Type 2 diabetes mellitus Social History     Tobacco Use   Smoking Status Never Smoker   Smokeless Tobacco Never Used

## 2020-07-28 NOTE — TELEPHONE ENCOUNTER
Patient missed 11:40 a m  F/u apt today Tues 7/28 w/Dr Shell Disla  Called pt and left message to call the office asap as pt is a tx pt  [phone]

## 2020-07-30 NOTE — TELEPHONE ENCOUNTER
Stephany Ahuja called stating that [facility] is having a hard time getting the amlodipine in  She is asking for something else to be sent in for him

## 2020-07-31 NOTE — TELEPHONE ENCOUNTER
Roxie Co aware, she is also aware that she needs to be checking his blood pressure because of the dosing of the new medication

## 2020-07-31 NOTE — TELEPHONE ENCOUNTER
Nifedipine 60 mg once daily was sent to the patient's pharmacy  Please ask that they keep track of his blood pressures as the dosing conversion is not exactly 60 mg may either be a little bit too much or little bit too low depending on how he reacts to the medication

## 2020-07-31 NOTE — PROGRESS NOTES
I discontinued amlodipine and place the patient instead on nifedipine due to amlodipine not been available at the pharmacy  Patient support dosing is 10 mg of amlodipine, will place him on 60 mg of nifedipine for now  Patient will monitor blood pressures and if indicated increased dose or decrease dose accordingly

## 2020-08-06 PROBLEM — E11.65 TYPE 2 DIABETES MELLITUS WITH HYPERGLYCEMIA, WITH LONG-TERM CURRENT USE OF INSULIN (HCC): Status: ACTIVE | Noted: 2019-01-23

## 2020-08-06 PROBLEM — R07.9 CHEST PAIN: Status: ACTIVE | Noted: 2020-01-01

## 2020-08-06 PROBLEM — R33.9 URINARY RETENTION: Status: ACTIVE | Noted: 2020-01-01

## 2020-08-06 PROBLEM — R79.89 ELEVATED D-DIMER: Status: ACTIVE | Noted: 2020-01-01

## 2020-08-06 PROBLEM — R07.89 CHEST PRESSURE: Status: ACTIVE | Noted: 2020-01-01

## 2020-08-06 NOTE — ASSESSMENT & PLAN NOTE
· Follows with Dr Sharan Lin as outpt  · Currently undergoing monthly chemo injections at [address] [place] [address]

## 2020-08-06 NOTE — H&P
H&P- Aracely Simpson 1940, 79 y o  male MRN: [id number]    Unit/Bed#: -01 Encounter: [id number]    Primary Care Provider: RIKA Pickens   Date and time admitted to hospital: 8/6/2020 12:32 AM        * Chest pressure  Assessment & Plan  · Reports Lt sided chest pressure with associated SOB PTA  Pain resolved after ASA and SL NTG  Was noted to be in afib on arrival to ED  (-) hx of afib  Now in SR with frequent PACs  · Initial troponin (-)  Will trend x 3  · 12 lead EKG revealed Afib at 94bpm with T wave inversion in lateral leads  · CXR (-)  · D dimer elevated @ 2 76  Unable to obtain CTA 2/2 CKD 4  ED provider spoke with cardiology who recommended anticoagulation  · Heparin gtt initiated in ED  · VQ scan pending  · Continue home dose ASA, Lopressor and Nifedipine  · Initiate Lipitor 40mg daily  · FLP in am    Elevated d-dimer  Assessment & Plan  · See AP above    Type 2 diabetes mellitus with hyperglycemia, with long-term current use of insulin ([facility] [address] )  Assessment & Plan  Lab Results   Component Value Date    HGBA1C 7 5 (A) 06/18/2020       No results for input(s): POCGLU in the last 72 hours      Blood Sugar Average: Last 72 hrs:     · Continue home dose Levemir 10 units at HS, and Humalog 4 units TID AC  · FSBS AC & HS w/ SSI  · Diabetic diet    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 [facility])  Assessment & Plan  · Follows with Dr Rebecca Izaguirre as outpt  · Currently undergoing monthly chemo injections at [address] [place] [address]  · 1+ edema bilateral feet  · Continue home dose Lasix 40mg (takes PRN for edema)    Gastroesophageal reflux disease without esophagitis  Assessment & Plan  · Continue home dose Protonix  · Mylanta PRN    Hypertensive kidney disease with stage 4 chronic kidney disease (HCC)  Assessment & Plan  · Creatinine 3 59 on admission which appears to be at baseline  · Avoid hypotension and nephrotoxic agents  · BMP in am    Normocytic anemia  Assessment & Plan  · Hb stable at 8 6  MCV 93  · Likely 2/2 CKD  · CBC in am    Urinary retention  Assessment & Plan  · Continue home dose Flomax  · Has chronic Haile cath which was exchanged today at Dr Adri Kauffman office per wife      VTE Prophylaxis: Heparin Drip  / sequential compression device   Code Status: Level 1 Full Code  POLST: POLST form is not discussed and not completed at this time  Discussion with family: NA    Anticipated Length of Stay:  Patient will be admitted on an Inpatient basis with an anticipated length of stay of  Greater than 2 midnights  Justification for Hospital Stay: See AP above    Total Time for Visit, including Counseling / Coordination of Care: 45 minutes  Greater than 50% of this total time spent on direct patient counseling and coordination of care  Chief Complaint:   Chest pain and SOB    History of Present Illness:    Lin Perez is a 79 y o  male who presents with Lt sided chest pressure with associated SOB PTA  Pain resolved after ASA and SL NTG  Was noted to be in afib on arrival to ED  (-) hx of afib  Now in SR with frequent PACs  Currently undergoing chemo for a neuroendocrine tumor  Follows w/ Dr Keaton Barger as outpt  Review of Systems:    Review of Systems   Constitutional: Positive for chills  Negative for appetite change and fever  HENT: Negative for congestion, ear pain, sinus pressure and sore throat  Eyes: Negative for visual disturbance  Respiratory: Positive for shortness of breath  Negative for cough and wheezing  Cardiovascular: Positive for chest pain  Negative for palpitations and leg swelling  Gastrointestinal: Negative for abdominal pain, constipation, diarrhea, nausea and vomiting  Genitourinary: Negative for difficulty urinating, dysuria, frequency and urgency  Musculoskeletal: Negative for neck pain and neck stiffness  Neurological: Negative for dizziness, syncope, light-headedness and headaches  All other systems reviewed and are negative        Past Medical and Surgical History:     Past Medical History:   Diagnosis Date    Acute pancreatitis without infection or necrosis 9/26/2019    Anemia of chronic renal failure     BPH (benign prostatic hyperplasia)     Chronic kidney disease (CKD), stage IV (severe)     Coronary artery disease     DJD (degenerative joint disease)     Essential hypertension     Gait disturbance     Generalized weakness     GERD (gastroesophageal reflux disease)     Gout     History of transfusion     Hydronephrosis     Hyperlipidemia     Hypertension     Liver cancer     Pressure injury of skin     Proteinuria     Renal disorder     Retention of urine     Thrombophlebitis migrans     Type 2 diabetes mellitus        Past Surgical History:   Procedure Laterality Date    CHOLECYSTECTOMY      CHOLECYSTECTOMY LAPAROSCOPIC N/A 2/7/2020    Procedure: CHOLECYSTECTOMY LAPAROSCOPIC;  Surgeon: Philomena Rowe MD;  Location: [address] [place], [place] [address] MAIN OR;  Service: General    CORONARY ANGIOPLASTY WITH STENT PLACEMENT      FL RETROGRADE PYELOGRAM  5/21/2020    IR IMAGE GUIDED BIOPSY/ASPIRATION LIVER  1/24/2019    IR TUBE PLACEMENT ROQUE  9/6/2019    RI CYSTO/URETERO W/LITHOTRIPSY &INDWELL STENT INSRT Left 5/21/2020    Procedure: CYSTOSCOPY URETEROSCOPY WITH LITHOTRIPSY HOLMIUM LASER, RETROGRADE PYELOGRAM AND INSERTION STENT URETERAL;  Surgeon: Racquel Mcelroy MD;  [facility];  Service: Urology    RI CYSTOURETHROSCOPY,URETER CATHETER Left 4/21/2020    Procedure: CYSTOSCOPY WITH INSERTION STENT URETERAL;  Surgeon: Madison Hunter MD;  [facility];  Service: Urology       Meds/Allergies:    Prior to Admission medications    Medication Sig Start Date End Date Taking?  Authorizing Provider   sodium bicarbonate 650 mg tablet Take 650 mg by mouth 3 (three) times a day before meals   Yes Historical Provider, MD   acetaminophen (TYLENOL) 325 mg tablet Take 650 mg by mouth every 6 (six) hours as needed     Historical Provider, MD aspirin 81 MG tablet Take 81 mg by mouth daily    Historical Provider, MD YODER Complex-C-Folic Acid (TRIPHROCAPS) 1 MG CAPS Take 1 capsule (1 mg total) by mouth daily with dinner 7/22/20   Loree Kerns DO   BD INSULIN SYRINGE U/F 30G X 1/2" 0 3 ML MISC  3/2/20   Historical Provider, MD   cholecalciferol (VITAMIN D3) 1,000 units tablet Take 1,000 Units by mouth daily    Historical Provider, MD   colchicine (COLCRYS) 0 6 mg tablet Take 0 5 tablets (0 3 mg total) by mouth daily  Patient taking differently: Take 0 6 mg by mouth daily  2/20/20 7/23/20  Darwin Officer, MD   furosemide (LASIX) 40 mg tablet Take 1 tablet (40 mg total) by mouth daily , do NOT take if you do not have swelling 7/13/20   Loree Kerns DO   insulin detemir (LEVEMIR) 100 units/mL subcutaneous injection Inject 10 Units under the skin daily before breakfast    Historical Provider, MD   insulin lispro (HumaLOG) 100 units/mL injection Inject 4 Units under the skin 3 (three) times a day before meals 6/26/20   Nena Arguello MD   Insulin Syringe-Needle U-100 30G X 1/2" 1 ML MISC by Does not apply route 3 (three) times a day 12/26/19   Carmela Rao MD   magnesium oxide (MAG-OX) 400 mg Take 1 tablet (400 mg total) by mouth 2 (two) times a day 4/16/20   Loree Kerns DO   metoprolol tartrate (LOPRESSOR) 25 mg tablet Take 0 5 tablets (12 5 mg total) by mouth every 12 (twelve) hours 6/26/20 7/26/20  Nena Arguello MD   NIFEdipine ER (ADALAT CC) 60 MG 24 hr tablet Take 1 tablet (60 mg total) by mouth daily 7/31/20   Loree Kerns DO   nystatin (MYCOSTATIN) powder Apply topically 2 (two) times a day 5/7/20   Carmela Rao MD   pantoprazole (PROTONIX) 40 mg tablet Take 1 tablet (40 mg total) by mouth daily in the early morning 4/1/20   Loree Kerns DO   tamsulosin (FLOMAX) 0 4 mg Take 1 capsule (0 4 mg total) by mouth daily with dinner 4/9/20   Loree Kerns, DO     I have reviewed home medications with patient personally  Allergies: No Known Allergies    Social History:     Marital Status:    Patient Pre-hospital Living Situation: Lives w/ wife  Patient Pre-hospital Level of Mobility: Ambulatory w/ walker  Patient Pre-hospital Diet Restrictions: None  Substance Use History:   Social History     Substance and Sexual Activity   Alcohol Use Not Currently    Alcohol/week: 0 0 standard drinks    Frequency: Never     Social History     Tobacco Use   Smoking Status Never Smoker   Smokeless Tobacco Never Used     Social History     Substance and Sexual Activity   Drug Use Never       Family History:    Family History   Problem Relation Age of Onset    Cancer Mother     Hypertension Father     Cancer Brother     Cancer Maternal Grandmother     Cancer Paternal Aunt        Physical Exam:     Vitals:   Blood Pressure: 130/72 (08/06/20 0230)  Pulse: 81 (08/06/20 0230)  Temperature: 97 9 °F (36 6 °C) (08/06/20 0131)  Temp Source: Oral (08/06/20 0131)  Respirations: 22 (08/06/20 0230)  Height: 5' 8" (172 7 cm) (08/06/20 0035)  Weight - Scale: 81 2 kg (179 lb 0 2 oz) (08/06/20 0035)  SpO2: 94 % (08/06/20 0230)    Physical Exam   Constitutional: He is oriented to person, place, and time  HENT:   Head: Normocephalic and atraumatic  Nose: Nose normal    Mouth/Throat: Mucous membranes are moist    Neck: Normal range of motion  Neck supple  Cardiovascular: Normal rate, normal heart sounds and normal pulses  An irregular rhythm present  Exam reveals no gallop and no friction rub  No murmur heard  Pulmonary/Chest: Effort normal and breath sounds normal  No respiratory distress  He has no wheezes  He has no rales  Abdominal: Soft  Normal appearance  He exhibits no distension  There is no abdominal tenderness  There is no guarding  Musculoskeletal: Normal range of motion  General: No swelling or tenderness  Neurological: He is alert and oriented to person, place, and time     Skin: Skin is warm and dry    Psychiatric: His behavior is normal  Mood and thought content normal        Additional Data:     Lab Results: I have personally reviewed pertinent reports  Results from last 7 days   Lab Units 08/06/20  0050   WBC Thousand/uL 8 74   HEMOGLOBIN g/dL 8 6*   HEMATOCRIT % 25 9*   PLATELETS Thousands/uL 136*   NEUTROS PCT % 82*   LYMPHS PCT % 10*   MONOS PCT % 5   EOS PCT % 1     Results from last 7 days   Lab Units 08/06/20  0050   SODIUM mmol/L 139   POTASSIUM mmol/L 3 5   CHLORIDE mmol/L 102   CO2 mmol/L 26   BUN mg/dL 47*   CREATININE mg/dL 3 59*   ANION GAP mmol/L 11   CALCIUM mg/dL 8 3   ALBUMIN g/dL 3 2*   TOTAL BILIRUBIN mg/dL 0 50   ALK PHOS U/L 126*   ALT U/L 24   AST U/L 16   GLUCOSE RANDOM mg/dL 192*                       Imaging: I have personally reviewed pertinent films in PACS    XR chest 1 view portable    (Results Pending)   NM inpatient order    (Results Pending)       EKG, Pathology, and Other Studies Reviewed on Admission:   · EKG: Afib at 94bpm  T wave inversion in lateral leads    Allscripts / Epic Records Reviewed: Yes     ** Please Note: This note has been constructed using a voice recognition system   **

## 2020-08-06 NOTE — SOCIAL WORK
CM placed call to speak with patient to conduct CM open as he is on contact precautions for MDRO and ESBL  Patient resides with his [address] in a single story home in [facility]  There are 2 BETH and patient is normally able to navigate his home without concern  Patient utilizes a RW to ambulate and also has a BSC and showerchair at home to use  Patient receives assistance with ADLs from his SO and is current with [address] for SN/PT/OT  Patient also reports history of SNF at [facility] [place] [facility] [place]  Patient utilizes [facility] in [facility], has Rx plan and is able to afford all copays  Patient denies history of MH/substance use  Patient identifies his SO Ab Carter and his son Berny Roman both as his POAs; patient does not have AD  Patient receives SSI and Jennifer Davis provides all transport and will do so upon discharge  Patient would like to resume care with [address] for SN/PT/OT  CM called and spoke with Jennifer Davis to review the above information, including the recommendation for rehab  Jennifer Davis reports patient will never agree to go to rehab again and she supports his decision to come home with VNA in place  Referral to in for [facility]       CM reviewed discharge planning process including the following: identifying caregivers at home, preference for d/c planning needs, availability of treatment team to discuss questions or concerns patient and/or family may have regarding diagnosis, plan of care, old or new medications and discharge planning   CM will continue to follow for care coordination and update assessment as appropriate  A post acute care recommendation was made by your care team for [facility]  Discussed Freedom of Choice with both patient and caregiver  Choice is to return/continue services

## 2020-08-06 NOTE — ASSESSMENT & PLAN NOTE
· Reports Lt sided chest pressure with associated SOB PTA  Pain resolved after ASA and SL NTG  Was noted to be in afib on arrival to ED  (-) hx of afib  Now in SR with frequent PACs  · Initial troponin (-)  Will trend x 3  · 12 lead EKG revealed Afib at 94bpm with T wave inversion in lateral leads  · CXR (-)  · D dimer elevated @ 2 76  Unable to obtain CTA 2/2 CKD 4   ED provider spoke with cardiology who recommended anticoagulation  · Heparin gtt initiated in ED  · VQ scan pending  · Continue home dose ASA, Lopressor and Nifedipine  · Initiate Lipitor 40mg daily  · FLP in am

## 2020-08-06 NOTE — ED NOTES
SLIM at bedside     Lavell Bernal, [address] [facility]  08/06/20 3403

## 2020-08-06 NOTE — ASSESSMENT & PLAN NOTE
· Creatinine 3 59 on admission which appears to be at baseline  · Avoid hypotension and nephrotoxic agents  · BMP in am

## 2020-08-06 NOTE — ASSESSMENT & PLAN NOTE
· Continue home dose Flomax  · Has chronic Haile cath which was exchanged today at Dr Adri Kauffman office per wife

## 2020-08-06 NOTE — PLAN OF CARE
Problem: Potential for Falls  Goal: Patient will remain free of falls  Description: INTERVENTIONS:  - Assess patient frequently for physical needs  -  Identify cognitive and physical deficits and behaviors that affect risk of falls    -  [place] fall precautions as indicated by assessment   - Educate patient/family on patient safety including physical limitations  - Instruct patient to call for assistance with activity based on assessment  - Modify environment to reduce risk of injury  - Consider OT/PT consult to assist with strengthening/mobility  Outcome: Progressing     Problem: PAIN - ADULT  Goal: Verbalizes/displays adequate comfort level or baseline comfort level  Description: Interventions:  - Encourage patient to monitor pain and request assistance  - Assess pain using appropriate pain scale  - Administer analgesics based on type and severity of pain and evaluate response  - Implement non-pharmacological measures as appropriate and evaluate response  - Consider cultural and social influences on pain and pain management  - Notify physician/advanced practitioner if interventions unsuccessful or patient reports new pain  Outcome: Progressing     Problem: INFECTION - ADULT  Goal: Absence or prevention of progression during hospitalization  Description: INTERVENTIONS:  - Assess and monitor for signs and symptoms of infection  - Monitor lab/diagnostic results  - Monitor all insertion sites, i e  indwelling lines, tubes, and drains  - Monitor endotracheal if appropriate and nasal secretions for changes in amount and color  - [place] appropriate cooling/warming therapies per order  - Administer medications as ordered  - Instruct and encourage patient and family to use good hand hygiene technique  - Identify and instruct in appropriate isolation precautions for identified infection/condition  Outcome: Progressing     Problem: SAFETY ADULT  Goal: Patient will remain free of falls  Description: INTERVENTIONS:  - Assess patient frequently for physical needs  -  Identify cognitive and physical deficits and behaviors that affect risk of falls    -  [place] fall precautions as indicated by assessment   - Educate patient/family on patient safety including physical limitations  - Instruct patient to call for assistance with activity based on assessment  - Modify environment to reduce risk of injury  - Consider OT/PT consult to assist with strengthening/mobility  Outcome: Progressing     Problem: SAFETY ADULT  Goal: Maintain or return to baseline ADL function  Description: INTERVENTIONS:  -  Assess patient's ability to carry out ADLs; assess patient's baseline for ADL function and identify physical deficits which impact ability to perform ADLs (bathing, care of mouth/teeth, toileting, grooming, dressing, etc )  - Assess/evaluate cause of self-care deficits   - Assess range of motion  - Assess patient's mobility; develop plan if impaired  - Assess patient's need for assistive devices and provide as appropriate  - Encourage maximum independence but intervene and supervise when necessary  - Involve family in performance of ADLs  - Assess for home care needs following discharge   - Consider OT consult to assist with ADL evaluation and planning for discharge  - Provide patient education as appropriate  Outcome: Progressing     Problem: Prexisting or High Potential for Compromised Skin Integrity  Goal: Skin integrity is maintained or improved  Description: INTERVENTIONS:  - Identify patients at risk for skin breakdown  - Assess and monitor skin integrity  - Assess and monitor nutrition and hydration status  - Monitor labs   - Assess for incontinence   - Turn and reposition patient  - Assist with mobility/ambulation  - Relieve pressure over bony prominences  - Avoid friction and shearing  - Provide appropriate hygiene as needed including keeping skin clean and dry  - Evaluate need for skin moisturizer/barrier cream  - Collaborate with interdisciplinary team   - Patient/family teaching  - Consider wound care consult   Outcome: Progressing     Problem: CARDIOVASCULAR - ADULT  Goal: Maintains optimal cardiac output and hemodynamic stability  Description: INTERVENTIONS:  - Monitor I/O, vital signs and rhythm  - Monitor for S/S and trends of decreased cardiac output  - Administer and titrate ordered vasoactive medications to optimize hemodynamic stability  - Assess quality of pulses, skin color and temperature  - Assess for signs of decreased coronary artery perfusion  - Instruct patient to report change in severity of symptoms  Outcome: Progressing     Problem: CARDIOVASCULAR - ADULT  Goal: Absence of cardiac dysrhythmias or at baseline rhythm  Description: INTERVENTIONS:  - Continuous cardiac monitoring, vital signs, obtain 12 lead EKG if ordered  - Administer antiarrhythmic and heart rate control medications as ordered  - Monitor electrolytes and administer replacement therapy as ordered  Outcome: Progressing

## 2020-08-06 NOTE — ED PROVIDER NOTES
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 who presents to the emergency department via EMS for complaint of chest pain and shortness of breath beginning at approximately 11p while sitting at home  Patient denies feeling this pain before  He describes pain as a pressure sensation, without radiation to arms, neck, or back, constant, did not try any home meds  Was given 324mg aspirin and one dose of SL nitro in EMS transit, reports pain resolved after meds, currently still resolved and asymptomatic  Denies any nausea, vomiting, neck pain or stiffness, chills, diaphoresis, lightheadedness/dizziness, syncope, abdominal discomfort  Patient had recent Holter monitoring performed, showing "sinus rhythm with rare PACs and PVCs, with 1 episode of paroxysmal SVT appearing to be atrial tachycardia"  Girlfriend at bedside reports he was recently started on nicardipine 60mg daily, switched from metoprolol 25mg daily due to drug shortage and unavailability of drug for refill  However, on records review, it appears nephrology office thought she was talking about the amlodipine on shortage versus metoprolol; office discontinued amlodipine and started the patient on nicardipine instead  Prior to Admission Medications   Prescriptions Last Dose Informant Patient Reported? Taking?    B Complex-C-Folic Acid (TRIPHROCAPS) 1 MG CAPS   No No   Sig: Take 1 capsule (1 mg total) by mouth daily with dinner   BD INSULIN SYRINGE U/F 30G X 1/2" 0 3 ML MISC  Spouse/Significant Other Yes No   Insulin Syringe-Needle U-100 30G X 1/2" 1 ML MISC Spouse/Significant Other No No   Sig: by Does not apply route 3 (three) times a day   NIFEdipine ER (ADALAT CC) 60 MG 24 hr tablet   No No   Sig: Take 1 tablet (60 mg total) by mouth daily   acetaminophen (TYLENOL) 325 mg tablet  Spouse/Significant Other Yes No   Sig: Take 650 mg by mouth every 6 (six) hours as needed    aspirin 81 MG tablet  Spouse/Significant Other Yes No   Sig: Take 81 mg by mouth daily   cholecalciferol (VITAMIN D3) 1,000 units tablet  Spouse/Significant Other Yes No   Sig: Take 1,000 Units by mouth daily   colchicine (COLCRYS) 0 6 mg tablet  Spouse/Significant Other No No   Sig: Take 0 5 tablets (0 3 mg total) by mouth daily   Patient taking differently: Take 0 6 mg by mouth daily    furosemide (LASIX) 40 mg tablet   No No   Sig: Take 1 tablet (40 mg total) by mouth daily , do NOT take if you do not have swelling   insulin detemir (LEVEMIR) 100 units/mL subcutaneous injection  Spouse/Significant Other Yes No   Sig: Inject 10 Units under the skin daily before breakfast   insulin lispro (HumaLOG) 100 units/mL injection  Spouse/Significant Other No No   Sig: Inject 4 Units under the skin 3 (three) times a day before meals   magnesium oxide (MAG-OX) 400 mg  Spouse/Significant Other No No   Sig: Take 1 tablet (400 mg total) by mouth 2 (two) times a day   metoprolol tartrate (LOPRESSOR) 25 mg tablet  Spouse/Significant Other No No   Sig: Take 0 5 tablets (12 5 mg total) by mouth every 12 (twelve) hours   nystatin (MYCOSTATIN) powder  Spouse/Significant Other No No   Sig: Apply topically 2 (two) times a day   pantoprazole (PROTONIX) 40 mg tablet  Spouse/Significant Other No No   Sig: Take 1 tablet (40 mg total) by mouth daily in the early morning   sodium bicarbonate 650 mg tablet   Yes Yes   Sig: Take 650 mg by mouth 3 (three) times a day before meals   tamsulosin (FLOMAX) 0 4 mg  Spouse/Significant Other No No   Sig: Take 1 capsule (0 4 mg total) by mouth daily with dinner      Facility-Administered Medications: None       Past Medical History:   Diagnosis Date    Acute pancreatitis without infection or necrosis 9/26/2019    Anemia of chronic renal failure     BPH (benign prostatic hyperplasia)     Chronic kidney disease (CKD), stage IV (severe)     Coronary artery disease     DJD (degenerative joint disease)     Essential hypertension     Gait disturbance     Generalized weakness     GERD (gastroesophageal reflux disease)     Gout     History of transfusion     Hydronephrosis     Hyperlipidemia     Hypertension     Liver cancer     Pressure injury of skin     Proteinuria     Renal disorder     Retention of urine     Thrombophlebitis migrans     Type 2 diabetes mellitus        Past Surgical History:   Procedure Laterality Date    CHOLECYSTECTOMY      CHOLECYSTECTOMY LAPAROSCOPIC N/A 2/7/2020    Procedure: CHOLECYSTECTOMY LAPAROSCOPIC;  Surgeon: Kojo Salguero MD;  Location: [facility] MAIN OR;  Service: General    CORONARY ANGIOPLASTY WITH STENT PLACEMENT      FL RETROGRADE PYELOGRAM  5/21/2020    IR IMAGE GUIDED BIOPSY/ASPIRATION LIVER  1/24/2019    IR TUBE PLACEMENT ROQUE  9/6/2019    VA CYSTO/URETERO W/LITHOTRIPSY &INDWELL STENT INSRT Left 5/21/2020    Procedure: CYSTOSCOPY URETEROSCOPY WITH LITHOTRIPSY HOLMIUM LASER, RETROGRADE PYELOGRAM AND INSERTION STENT URETERAL;  Surgeon: Enrique Eric MD;  Location: [facility];  Service: Urology    VA CYSTOURETHROSCOPY,URETER CATHETER Left 4/21/2020    Procedure: CYSTOSCOPY WITH INSERTION STENT URETERAL;  Surgeon: Carolynn Rhodes MD;  Location[facility];  Service: Urology       Family History   Problem Relation Age of Onset    Cancer Mother     Hypertension Father     Cancer Brother     Cancer Maternal Grandmother     Cancer Paternal Aunt      I have reviewed and agree with the history as documented      E-Cigarette/Vaping    E-Cigarette Use Never User      E-Cigarette/Vaping Substances     Social History     Tobacco Use    Smoking status: Never Smoker    Smokeless tobacco: Never Used   Substance Use Topics    Alcohol use: Not Currently     Alcohol/week: 0 0 standard drinks     Frequency: Never    Drug use: Never       Review of Systems   Constitutional: Negative for appetite change, chills, diaphoresis, fatigue and fever  Respiratory: Positive for shortness of breath  Negative for cough and chest tightness  Cardiovascular: Positive for chest pain  Negative for palpitations and leg swelling  Gastrointestinal: Negative for abdominal distention, abdominal pain, nausea and vomiting  Musculoskeletal: Negative for back pain  Skin: Negative for color change  Neurological: Negative for dizziness, syncope, weakness, light-headedness, numbness and headaches  All other systems reviewed and are negative  Physical Exam  Physical Exam  Vitals signs reviewed  Constitutional:       General: He is not in acute distress  Appearance: Normal appearance  He is well-developed  He is not toxic-appearing  HENT:      Head: Normocephalic and atraumatic  Mouth/Throat:      Lips: Pink  Mouth: Mucous membranes are moist       Pharynx: Oropharynx is clear  Uvula midline  Eyes:      Extraocular Movements: Extraocular movements intact  Conjunctiva/sclera: Conjunctivae normal       Pupils: Pupils are equal, round, and reactive to light  Neck:      Musculoskeletal: Normal range of motion and neck supple  Cardiovascular:      Rate and Rhythm: Normal rate  Rhythm irregularly irregular  Pulses: Normal pulses  Heart sounds: Normal heart sounds  No murmur  Pulmonary:      Effort: Pulmonary effort is normal       Breath sounds: Normal breath sounds  Chest:      Chest wall: No tenderness  Musculoskeletal: Normal range of motion  Skin:     General: Skin is warm  Capillary Refill: Capillary refill takes less than 2 seconds  Findings: No erythema or lesion     Neurological:      Mental Status: He is alert and oriented to person, place, and time  GCS: GCS eye subscore is 4  GCS verbal subscore is 5  GCS motor subscore is 6           Vital Signs  ED Triage Vitals   Temperature Pulse Respirations Blood Pressure SpO2   08/06/20 0131 08/06/20 0035 08/06/20 0035 08/06/20 0035 08/06/20 0035   97 9 °F (36 6 °C) (!) 108 18 124/61 96 %      Temp Source Heart Rate Source Patient Position - Orthostatic VS BP Location FiO2 (%)   08/06/20 0131 08/06/20 0035 08/06/20 0045 08/06/20 0035 --   Oral Monitor Sitting Left arm       Pain Score       08/06/20 0320       No Pain           Vitals:    08/06/20 0700 08/06/20 1506 08/06/20 2207 08/07/20 0004   BP: 146/67 116/60 122/57 131/66   Pulse: 77 79 80 77   Patient Position - Orthostatic VS: Lying Lying Lying Lying         Visual Acuity      ED Medications  Medications   aspirin (ECOTRIN LOW STRENGTH) EC tablet 81 mg (81 mg Oral Given 8/6/20 0802)   b complex-vitamin C-folic acid (NEPHROCAPS) capsule 1 capsule (1 capsule Oral Given 8/6/20 1913)   cholecalciferol (VITAMIN D3) tablet 1,000 Units (1,000 Units Oral Given 8/6/20 0802)   furosemide (LASIX) tablet 40 mg (40 mg Oral Given 8/6/20 0802)   insulin detemir (LEVEMIR) subcutaneous injection 10 Units (10 Units Subcutaneous Given 8/6/20 2208)   insulin lispro (HumaLOG) 100 units/mL subcutaneous injection 4 Units (4 Units Subcutaneous Given 8/6/20 1907)   magnesium oxide (MAG-OX) tablet 400 mg (400 mg Oral Given 8/6/20 1913)   metoprolol tartrate (LOPRESSOR) partial tablet 12 5 mg (12 5 mg Oral Given 8/6/20 2207)   NIFEdipine (PROCARDIA XL) 24 hr tablet 60 mg (60 mg Oral Given 8/6/20 0803)   pantoprazole (PROTONIX) EC tablet 40 mg (40 mg Oral Given 8/6/20 0615)   tamsulosin (FLOMAX) capsule 0 4 mg (0 4 mg Oral Given 8/6/20 1907)   sodium bicarbonate tablet 650 mg (650 mg Oral Given 8/6/20 1907)   magnesium hydroxide (MILK OF MAGNESIA) 400 mg/5 mL oral suspension 30 mL (has no administration in time range)   ondansetron (ZOFRAN) injection 4 mg (has no administration in time range)   aluminum-magnesium hydroxide-simethicone (MYLANTA) 200-200-20 mg/5 mL oral suspension 30 mL (has no administration in time range)   atorvastatin (LIPITOR) tablet 40 mg (40 mg Oral Given 8/6/20 1907)   insulin lispro (HumaLOG) 100 units/mL subcutaneous injection 1-6 Units (2 Units Subcutaneous Given 8/6/20 1906)   insulin lispro (HumaLOG) 100 units/mL subcutaneous injection 1-6 Units (4 Units Subcutaneous Given 8/6/20 2208)   acetaminophen (TYLENOL) tablet 975 mg (has no administration in time range)   potassium chloride (K-DUR,KLOR-CON) CR tablet 20 mEq (20 mEq Oral Given 8/6/20 0216)       Diagnostic Studies  Results Reviewed     Procedure Component Value Units Date/Time    APTT six (6) hours after Heparin bolus/drip initiation or dosing change [[id number]]  (Abnormal) Collected:  08/06/20 0815    Lab Status:  Final result Specimen:  Blood from Hand, Left Updated:  08/06/20 0857     PTT 83 seconds     D-Dimer [[id number]]  (Abnormal) Collected:  08/06/20 0050    Lab Status:  Final result Specimen:  Blood from Arm, Left Updated:  08/06/20 0117     D-Dimer, Quant 2 76 ug/ml FEU     Troponin I [[id number]]  (Normal) Collected:  08/06/20 0050    Lab Status:  Final result Specimen:  Blood from Arm, Left Updated:  08/06/20 0114     Troponin I <0 02 ng/mL     Comprehensive metabolic panel [[id number]]  (Abnormal) Collected:  08/06/20 0050    Lab Status:  Final result Specimen:  Blood from Arm, Left Updated:  08/06/20 0111     Sodium 139 mmol/L      Potassium 3 5 mmol/L      Chloride 102 mmol/L      CO2 26 mmol/L      ANION GAP 11 mmol/L      BUN 47 mg/dL      Creatinine 3 59 mg/dL      Glucose 192 mg/dL      Calcium 8 3 mg/dL      AST 16 U/L      ALT 24 U/L      Alkaline Phosphatase 126 U/L      Total Protein 6 4 g/dL      Albumin 3 2 g/dL      Total Bilirubin 0 50 mg/dL      eGFR 15 ml/min/1 73sq m     Narrative:       [place] guidelines for Chronic Kidney Disease (CKD):     Stage 1 with normal or high GFR (GFR > 90 mL/min/1 73 square meters)    Stage 2 Mild CKD (GFR = 60-89 mL/min/1 73 square meters)    Stage 3A Moderate CKD (GFR = 45-59 mL/min/1 73 square meters)    Stage 3B Moderate CKD (GFR = 30-44 mL/min/1 73 square meters)    Stage 4 Severe CKD (GFR = 15-29 mL/min/1 73 square meters)    Stage 5 End Stage CKD (GFR <15 mL/min/1 73 square meters)  Note: GFR calculation is accurate only with a steady state creatinine    CBC and differential [[id number]]  (Abnormal) Collected:  08/06/20 0050    Lab Status:  Final result Specimen:  Blood from Arm, Left Updated:  08/06/20 0106     WBC 8 74 Thousand/uL      RBC 2 80 Million/uL      Hemoglobin 8 6 g/dL      Hematocrit 25 9 %      MCV 93 fL      MCH 30 7 pg      MCHC 33 2 g/dL      RDW 13 4 %      MPV 10 9 fL      Platelets 929 Thousands/uL      nRBC 0 /100 WBCs      Neutrophils Relative 82 %      Immat GRANS % 1 %      Lymphocytes Relative 10 %      Monocytes Relative 5 %      Eosinophils Relative 1 %      Basophils Relative 1 %      Neutrophils Absolute 7 23 Thousands/µL      Immature Grans Absolute 0 05 Thousand/uL      Lymphocytes Absolute 0 87 Thousands/µL      Monocytes Absolute 0 45 Thousand/µL      Eosinophils Absolute 0 09 Thousand/µL      Basophils Absolute 0 05 Thousands/µL                  NM lung perfusion imaging   Final Result by Lambert Godinez MD (08/06 5331)      The probability for pulmonary embolus is low  Workstation performed: [id number]         XR chest 1 view portable   Final Result by Val Smith MD (08/06 0963)      No acute cardiopulmonary disease              Workstation performed: [id number]                    Procedures  ECG 12 Lead Documentation Only    Date/Time: 8/7/2020 6:10 AM  Performed by: Ted Patterson PA-C  Authorized by: Ted Patterson PA-C     Indications / Diagnosis:  Chest pain  ECG reviewed by me, the ED Provider: yes    Patient location:  Bedside and ED  Previous ECG:     Previous ECG:  Compared to current    Comparison ECG info:  Rhythm change    Similarity:  Changes noted    Comparison to cardiac monitor: No    Interpretation:     Interpretation: abnormal    Rate:     ECG rate:  88    ECG rate assessment: normal    Rhythm:     Rhythm comment:  Sinus rhythm with frequent PVCs versus atrial fibrillation  Ectopy:     Ectopy: none    QRS:     QRS axis:  Left    QRS intervals:  Normal  Conduction:     Conduction: normal    ST segments:     ST segments:  Normal  T waves:     T waves: inverted      Inverted:  AVL             ED Course  ED Course as of Aug 07 0611   Thu Aug 06, 2020   0136 Dimer elevated however feel clinically this is less likely PE, as no hypoxia, tachycardia initially but now resolved, normal BP, all symptoms resolved after aspirin and nitro  Can't get PE study due to GFR  Will consult cardiology  0137 BUN Cr and hgb stable around baseline      0151 Discussed case with cardiology, CHADS VASC 5  Will anticoagulate with heparin and get VQ tomorrow           US AUDIT      Most Recent Value   Initial Alcohol Screen: US AUDIT-C    1  How often do you have a drink containing alcohol?  0 Filed at: 08/06/2020 0054   2  How many drinks containing alcohol do you have on a typical day you are drinking? 0 Filed at: 08/06/2020 0054   3a  Male UNDER 65: How often do you have five or more drinks on one occasion? 0 Filed at: 08/06/2020 0054   3b  FEMALE Any Age, or MALE 65+: How often do you have 4 or more drinks on one occassion?   0 Filed at: 08/06/2020 0054   Audit-C Score  0 Filed at: 08/06/2020 0054        [id number] SCORE      Most Recent Value   [id number]   Age  2 Filed at: 08/06/2020 0148   Sex  0 Filed at: 08/06/2020 0148   CHF History  0 Filed at: 08/06/2020 0148   HTN History  1 Filed at: 08/06/2020 0148   Stroke or TIA Symptoms Previously  0 Filed at: 08/06/2020 0148   Vascular Disease History  1 Filed at: 08/06/2020 0148   Diabetes History  1 Filed at: 08/06/2020 0148   [id number] Score  5 Filed at: 08/06/2020 0148            Identification of Seniors at Risk      Most Recent Value   (ISAR) Identification of Seniors at Risk   Before the illness or injury that brought you to the Emergency, did you need someone to help you on a regular basis? 0 Filed at: 08/06/2020 0039   In the last 24 hours, have you needed more help than usual?     Have you been hospitalized for one or more nights during the past 6 months?    In general, do you see well?    In general, do you have serious problems with your memory?    Do you take more than three different medications every day?    ISAR Score  0 Filed at: 08/06/2020 0039          JUAN/DAST-10      Most Recent Value   How many times in the past year have you    Used an illegal drug or used a prescription medication for non-medical reasons? Never Filed at: 08/06/2020 0054                                MDM  Number of Diagnoses or Management Options  Atrial fibrillation [facility]):   Chest pain:   Positive D dimer:   Diagnosis management comments: On exam, looks well, no distress, no tachypnea or other signs of respiratory distress, tachycardia, no hypoxia  A fib versus sinus rhythm with frequent PACs on monitor and bedside EKG, new in onset upon review of hx, if a fib could be rate controlled due to meds he is taking       Will pursue cardiac workup        Amount and/or Complexity of Data Reviewed  Clinical lab tests: ordered and reviewed  Tests in the radiology section of CPT®: ordered and reviewed  Tests in the medicine section of CPT®: ordered and reviewed  Discussion of test results with the performing providers: yes  Decide to obtain previous medical records or to obtain history from someone other than the patient: yes  Obtain history from someone other than the patient: yes  Review and summarize past medical records: yes  Discuss the patient with other providers: yes  Independent visualization of images, tracings, or specimens: yes    Risk of Complications, Morbidity, and/or Mortality  General comments: See ED course note for dispo and plan  I reviewed and discussed all lab and imaging findings with the patient at bedside  I answered any and all questions the patient had regarding emergency department course of evaluation and treatment  The patient verbalized understanding of and agreement with plan  Patient Progress  Patient progress: stable        Disposition  Final diagnoses:   Chest pain   Atrial fibrillation ([facility] )   Positive D dimer     Time reflects when diagnosis was documented in both MDM as applicable and the Disposition within this note     Time User Action Codes Description Comment    8/6/2020  1:53 AM Sage Bumpers Add [R07 9] Chest pain     8/6/2020  1:54 AM Sage Bumpers Add [I48 91] Atrial fibrillation ([facility] )     8/6/2020  1:54 AM Sage Bumpers Add [R79 89] Positive D dimer     8/6/2020  3:02 AM Edmundo Luz MANDEL Add [L89 610] Pressure injury of right heel, unstageable ([facility] )     8/6/2020  3:02 AM Adenike Adorno Add [E11 65,  Z79 4] Type 2 diabetes mellitus with hyperglycemia, with long-term current use of insulin ([facility] )     8/6/2020  5:05 PM Negrita Helms Add [D50 8] Iron deficiency anemia secondary to inadequate dietary iron intake     8/6/2020  5:05 PM Ady Suggs Remove [D50 8] Iron deficiency anemia secondary to inadequate dietary iron intake     8/6/2020  5:05 PM Negrita Helms Add [D64 9] Normocytic anemia       ED Disposition     ED Disposition Condition Date/Time Comment    Admit Stable Thu Aug 6, 2020  1:53 AM Case was discussed with Augustina Karimi and the patient's admission status was agreed to be Admission Status: inpatient status to the service of Dr Anaid Wong           Follow-up Information     Follow up With Specialties Details Why [address] [facility] Follow up [address] [place] [phone] [address] [facility] [address] [facility] [address]  [phone]            Current Discharge Medication List      CONTINUE these medications which have NOT CHANGED    Details   sodium bicarbonate 650 mg tablet Take 650 mg by mouth 3 (three) times a day before meals      acetaminophen (TYLENOL) 325 mg tablet Take 650 mg by mouth every 6 (six) hours as needed       aspirin 81 MG tablet Take 81 mg by mouth daily      B Complex-C-Folic Acid (TRIPHROCAPS) 1 MG CAPS Take 1 capsule (1 mg total) by mouth daily with dinner  Qty: 30 each, Refills: 5    Associated Diagnoses: Acute kidney injury superimposed on chronic kidney disease (HCC)      BD INSULIN SYRINGE U/F 30G X 1/2" 0 3 ML MISC       cholecalciferol (VITAMIN D3) 1,000 units tablet Take 1,000 Units by mouth daily      colchicine (COLCRYS) 0 6 mg tablet Take 0 5 tablets (0 3 mg total) by mouth daily  Qty: 15 tablet, Refills: 2    Associated Diagnoses: Gout, unspecified cause, unspecified chronicity, unspecified site      furosemide (LASIX) 40 mg tablet Take 1 tablet (40 mg total) by mouth daily , do NOT take if you do not have swelling  Qty: 30 tablet, Refills: 5    Associated Diagnoses: Edema, unspecified type      insulin detemir (LEVEMIR) 100 units/mL subcutaneous injection Inject 10 Units under the skin daily before breakfast      insulin lispro (HumaLOG) 100 units/mL injection Inject 4 Units under the skin 3 (three) times a day before meals  Refills: 0    Associated Diagnoses: Type 2 diabetes mellitus with hypoglycemia without coma, with long-term current use of insulin ([facility])      Insulin Syringe-Needle U-100 30G X 1/2" 1 ML MISC by Does not apply route 3 (three) times a day  Qty: 100 each, Refills: 5    Associated Diagnoses: Type 2 diabetes mellitus with hyperglycemia, with long-term current use of insulin ([facility])      magnesium oxide (MAG-OX) 400 mg Take 1 tablet (400 mg total) by mouth 2 (two) times a day  Qty: 60 tablet, Refills: 5    Associated Diagnoses: Acute hypokalemia metoprolol tartrate (LOPRESSOR) 25 mg tablet Take 0 5 tablets (12 5 mg total) by mouth every 12 (twelve) hours  Qty: 30 tablet, Refills: 0    Associated Diagnoses: Essential hypertension      NIFEdipine ER (ADALAT CC) 60 MG 24 hr tablet Take 1 tablet (60 mg total) by mouth daily  Qty: 30 tablet, Refills: 3    Associated Diagnoses: Essential hypertension      nystatin (MYCOSTATIN) powder Apply topically 2 (two) times a day  Qty: 15 g, Refills: 0    Associated Diagnoses: Intertrigo      pantoprazole (PROTONIX) 40 mg tablet Take 1 tablet (40 mg total) by mouth daily in the early morning  Qty: 90 tablet, Refills: 3    Associated Diagnoses: Nausea and vomiting      tamsulosin (FLOMAX) 0 4 mg Take 1 capsule (0 4 mg total) by mouth daily with dinner  Qty: 90 capsule, Refills: 3    Associated Diagnoses: Acute renal failure with other specified pathological kidney lesion superimposed on stage 3 chronic kidney disease (HCC)           No discharge procedures on file      PDMP Review       Value Time User    PDMP Reviewed  Yes 3/7/2020 12:10 PM Ian Silverio [address] Sheri Escobar           Provider  Electronically Signed by           Dejuan Bartlett PA-C  08/07/20 0882

## 2020-08-06 NOTE — CONSULTS
Consultation - Cardiology   Denita Schulz 79 y o  male MRN: [id number]  Unit/Bed#: -01 Encounter: [id number]  08/06/20  12:36 PM    Assessment/ Plan:  1  Questionable atrial fibrillation - EKG shows SR with PACs, left axis deviation nonspecific ST and T-wave abnormalities  Checked inpatient telemetry and cardiac event monitor which showed NSR with episodes of atrial tachycardia without any evidence of atrial fibrillation  Patient was started on IV heparin due to possible AF and/or PE given SOB and elevated D-dimer  Recommend ruling out PE with V/Q scan, if negative, patient can be discontinued from IV heparin  2  Chest pain - Troponins 0 03 x2, EKG does not show evidence of acute ischemia  Recent TTE 06/23/2020 shows EF 41%, grade 1 diastolic dysfunction, ventricular septum paradoxical motion consistent with LBBB, mild MAC, mild MR, mild TR  Overall conservative management at this time given advanced age, unstable hemoglobin, CKD4 and oncologic status  Continue ASA, atorvastatin and Lopressor  3  CAD s/p PCI - denies anginal equivalent  Plan as above  4  Hypertension - stable, continue nifedipine 60 mg daily and Lopressor 12 5 mg BID  5  Hyperlipidemia - continue atorvastatin  6  Anemia of chronic disease - Hgb 8 6/8 3  Continue to monitor      7  CKD4 - Cr 3 59 on admission     History of Present Illness   Physician Requesting Consult: Jeb Cedeno DO  Reason for Consult / Principal Problem: Evaluation for atrial fibrillation   HPI: Denita Schulz is a 79y o  year old male with history of CAD s/p PCI, CKD 4, DM 2, hypertension, hyperlipidemia, GERD, 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 who presents with complaint of chest pain and shortness of breath which began yesterday evening around 11:00pm   He reports sitting in his chair when he felt sharp stabbing sensation to his left chest with shortness of breath which did not subside for 20 minutes and patient called EMS for transport to the ED  Chest pain is without radiation or exertional component  Enroute, he received full aspirin and 1 dose sublingual nitroglycerin which resolved his chest pain without any recurrence  He denies previous episodes of chest pain like this in the past  On admission, he had 12 lead EKG which showed questionable atrial fibrillation with T-wave inversion in lateral leads  D-dimer was also noted to be elevated and was initiated on IV heparin  Given patient was CKD 4, CTA PE study was unable to be obtained and was scheduled for V/Q scan  Patient follows with Dr Sujey Iniguez  He was given cardiac event monitor due to recent hospitalization for syncope  On interrogation, it showed patient with normal sinus rhythm with PACs and PVCs with brief episode of SVT vs atrial tachycardia  He denies any current chest pain or discomfort, shortness of breath, orthopnea, PND, lower extremity edema or acute weight changes  He reports compliance with his home medication regimen  Inpatient consult to Cardiology  Consult performed by: Marva Liang PA-C  Consult ordered by: Yecenia Aquino PA-C          Review of Systems   Constitutional: Negative for appetite change, chills, diaphoresis, fatigue and fever  Respiratory: Negative for cough, chest tightness and shortness of breath  Cardiovascular: Negative for chest pain, palpitations and leg swelling  Gastrointestinal: Negative for diarrhea, nausea and vomiting  Endocrine: Negative for cold intolerance and heat intolerance  Genitourinary: Negative for difficulty urinating, dysuria and enuresis  Musculoskeletal: Negative for arthralgias, back pain and gait problem  Allergic/Immunologic: Negative for environmental allergies and food allergies  Neurological: Negative for dizziness, facial asymmetry and headaches  Hematological: Negative for adenopathy  Does not bruise/bleed easily     Psychiatric/Behavioral: Negative for agitation, behavioral problems and confusion  Historical Information   Past Medical History:   Diagnosis Date    Acute pancreatitis without infection or necrosis 9/26/2019    Anemia of chronic renal failure     BPH (benign prostatic hyperplasia)     Chronic kidney disease (CKD), stage IV (severe)     Coronary artery disease     DJD (degenerative joint disease)     Essential hypertension     Gait disturbance     Generalized weakness     GERD (gastroesophageal reflux disease)     Gout     History of transfusion     Hydronephrosis     Hyperlipidemia     Hypertension     Liver cancer     Pressure injury of skin     Proteinuria     Renal disorder     Retention of urine     Thrombophlebitis migrans     Type 2 diabetes mellitus      Past Surgical History:   Procedure Laterality Date    CHOLECYSTECTOMY      CHOLECYSTECTOMY LAPAROSCOPIC N/A 2/7/2020    Procedure: CHOLECYSTECTOMY LAPAROSCOPIC;  Surgeon:  Patria Gregory MD;  Location: [facility] [address] [place] MAIN OR;  Service: General    CORONARY ANGIOPLASTY WITH STENT PLACEMENT      FL RETROGRADE PYELOGRAM  5/21/2020    IR IMAGE GUIDED BIOPSY/ASPIRATION LIVER  1/24/2019    IR TUBE PLACEMENT ROQUE  9/6/2019    MT CYSTO/URETERO W/LITHOTRIPSY &INDWELL STENT INSRT Left 5/21/2020    Procedure: CYSTOSCOPY URETEROSCOPY WITH LITHOTRIPSY HOLMIUM LASER, RETROGRADE PYELOGRAM AND INSERTION STENT URETERAL;  Surgeon: Milton Segovia MD;  Location: [facility] MAIN [facility];  Service: Urology    MT CYSTOURETHROSCOPY,URETER CATHETER Left 4/21/2020    Procedure: CYSTOSCOPY WITH INSERTION STENT URETERAL;  Surgeon: Diamond Zambrano MD;  Location: [facility];  Service: Urology     Social History     Substance and Sexual Activity   Alcohol Use Not Currently    Alcohol/week: 0 0 standard drinks    Frequency: Never     Social History     Substance and Sexual Activity   Drug Use Never     Social History     Tobacco Use   Smoking Status Never Smoker   Smokeless Tobacco Never Used       Family History:   Family History   Problem Relation Age of Onset    Cancer Mother     Hypertension Father     Cancer Brother     Cancer Maternal Grandmother     Cancer Paternal Aunt        Meds/Allergies   current meds:   Current Facility-Administered Medications   Medication Dose Route Frequency    acetaminophen (TYLENOL) tablet 975 mg  975 mg Oral Q6H PRN    aluminum-magnesium hydroxide-simethicone (MYLANTA) 200-200-20 mg/5 mL oral suspension 30 mL  30 mL Oral Q6H PRN    aspirin (ECOTRIN LOW STRENGTH) EC tablet 81 mg  81 mg Oral Daily    atorvastatin (LIPITOR) tablet 40 mg  40 mg Oral QPM    b complex-vitamin C-folic acid (NEPHROCAPS) capsule 1 capsule  1 capsule Oral Daily With Dinner    cholecalciferol (VITAMIN D3) tablet 1,000 Units  1,000 Units Oral Daily    furosemide (LASIX) tablet 40 mg  40 mg Oral Daily    insulin detemir (LEVEMIR) subcutaneous injection 10 Units  10 Units Subcutaneous HS    insulin lispro (HumaLOG) 100 units/mL subcutaneous injection 1-6 Units  1-6 Units Subcutaneous TID AC    insulin lispro (HumaLOG) 100 units/mL subcutaneous injection 1-6 Units  1-6 Units Subcutaneous HS    insulin lispro (HumaLOG) 100 units/mL subcutaneous injection 4 Units  4 Units Subcutaneous TID AC    magnesium hydroxide (MILK OF MAGNESIA) 400 mg/5 mL oral suspension 30 mL  30 mL Oral Daily PRN    magnesium oxide (MAG-OX) tablet 400 mg  400 mg Oral BID    metoprolol tartrate (LOPRESSOR) partial tablet 12 5 mg  12 5 mg Oral Q12H [facility]    NIFEdipine (PROCARDIA XL) 24 hr tablet 60 mg  60 mg Oral Daily    ondansetron (ZOFRAN) injection 4 mg  4 mg Intravenous Q6H PRN    pantoprazole (PROTONIX) EC tablet 40 mg  40 mg Oral Early Morning    sodium bicarbonate tablet 650 mg  650 mg Oral TID AC    tamsulosin (FLOMAX) capsule 0 4 mg  0 4 mg Oral Daily With Dinner     No Known Allergies    Objective   Vitals: Blood pressure 146/67, pulse 77, temperature 97 7 °F (36 5 °C), temperature source Oral, resp   rate 19, height 5' 8" (1 727 m), weight 81 2 kg (179 lb 0 2 oz), SpO2 97 %  , Body mass index is 27 22 kg/m² ,   Orthostatic Blood Pressures      Most Recent Value   Blood Pressure  146/67 filed at 08/06/2020 0700   Patient Position - Orthostatic VS  Lying filed at 08/06/2020 9317          Systolic (34XXN), LNU:332 , Min:113 , TDK:700     Diastolic (60GGN), UCH:41, Min:58, Max:72        Intake/Output Summary (Last 24 hours) at 8/6/2020 1236  Last data filed at 8/6/2020 0902  Gross per 24 hour   Intake    Output 1400 ml   Net -1400 ml       Invasive Devices     Peripheral Intravenous Line            Peripheral IV 08/06/20 Right less than 1 day          Drain            Ureteral Drain/Stent Left ureter 6 Fr  76 days    Urethral Catheter Double-lumen 16 Fr  42 days                    Physical Exam:  GEN: Alert and oriented x 3, in no acute distress  Well appearing and well nourished  HEENT: Sclera anicteric, conjunctivae pink, mucous membranes moist  Oropharynx clear  NECK: Supple, no carotid bruits, no significant JVD  Trachea midline, no thyromegaly  HEART: Regular rhythm, normal S1 and S2, no murmurs, clicks, gallops or rubs  PMI nondisplaced, no thrills  LUNGS: Clear to auscultation bilaterally; no wheezes, rales, or rhonchi  No increased work of breathing or signs of respiratory distress  ABDOMEN: Soft, nontender, nondistended, normoactive bowel sounds  EXTREMITIES: Skin warm and well perfused, no clubbing, cyanosis, or edema  NEURO: No focal findings  Normal speech  Mood and affect normal    SKIN: Normal without suspicious lesions on exposed skin        Lab Results:     Troponins:   Results from last 7 days   Lab Units 08/06/20  0814 08/06/20  0436 08/06/20  0050   TROPONIN I ng/mL <0 02 <0 02 <0 02       CBC with diff:   Results from last 7 days   Lab Units 08/06/20  0444 08/06/20  0050   WBC Thousand/uL 7 76 8 74   HEMOGLOBIN g/dL 8 3* 8 6*   HEMATOCRIT % 25 2* 25 9*   MCV fL 93 93   PLATELETS Thousands/uL 124* 136*   MCH pg 30 6 30 7   MCHC g/dL 32 9 33 2   RDW % 13 6 13 4   MPV fL 10 9 10 9   NRBC AUTO /100 WBCs 0 0         CMP:   Results from last 7 days   Lab Units 08/06/20  0444 08/06/20  0050   POTASSIUM mmol/L 3 9 3 5   CHLORIDE mmol/L 103 102   CO2 mmol/L 28 26   BUN mg/dL 47* 47*   CREATININE mg/dL 3 66* 3 59*   CALCIUM mg/dL 8 4 8 3   AST U/L  --  16   ALT U/L  --  24   ALK PHOS U/L  --  126*   EGFR ml/min/1 73sq m 15 15

## 2020-08-06 NOTE — ED NOTES
Patient's wife at [address] [facility], [address] [facility]  08/06/20 0411

## 2020-08-06 NOTE — ASSESSMENT & PLAN NOTE
Lab Results   Component Value Date    HGBA1C 7 5 (A) 06/18/2020       No results for input(s): POCGLU in the last 72 hours      Blood Sugar Average: Last 72 hrs:     · Continue home dose Levemir 10 units at HS, and Humalog 4 units TID AC  · FSBS AC & HS w/ SSI  · Diabetic diet

## 2020-08-06 NOTE — PLAN OF CARE
Problem: Potential for Falls  Goal: Patient will remain free of falls  Description: INTERVENTIONS:  - Assess patient frequently for physical needs  -  Identify cognitive and physical deficits and behaviors that affect risk of falls  -  [place] fall precautions as indicated by assessment   - Educate patient/family on patient safety including physical limitations  - Instruct patient to call for assistance with activity based on assessment  - Modify environment to reduce risk of injury  - Consider OT/PT consult to assist with strengthening/mobility  Outcome: Progressing     Problem: PAIN - ADULT  Goal: Verbalizes/displays adequate comfort level or baseline comfort level  Description: Interventions:  - Encourage patient to monitor pain and request assistance  - Assess pain using appropriate pain scale  - Administer analgesics based on type and severity of pain and evaluate response  - Implement non-pharmacological measures as appropriate and evaluate response  - Consider cultural and social influences on pain and pain management  - Notify physician/advanced practitioner if interventions unsuccessful or patient reports new pain  Outcome: Progressing     Problem: SAFETY ADULT  Goal: Patient will remain free of falls  Description: INTERVENTIONS:  - Assess patient frequently for physical needs  -  Identify cognitive and physical deficits and behaviors that affect risk of falls    -  [place] fall precautions as indicated by assessment   - Educate patient/family on patient safety including physical limitations  - Instruct patient to call for assistance with activity based on assessment  - Modify environment to reduce risk of injury  - Consider OT/PT consult to assist with strengthening/mobility  Outcome: Progressing

## 2020-08-06 NOTE — PLAN OF CARE
Problem: Potential for Falls  Goal: Patient will remain free of falls  Description: INTERVENTIONS:  - Assess patient frequently for physical needs  -  Identify cognitive and physical deficits and behaviors that affect risk of falls    -  [place] fall precautions as indicated by assessment   - Educate patient/family on patient safety including physical limitations  - Instruct patient to call for assistance with activity based on assessment  - Modify environment to reduce risk of injury  - Consider OT/PT consult to assist with strengthening/mobility  8/6/2020 1129 by Tiago Benito RN  Outcome: Progressing  8/6/2020 0354 by Tiago Benito RN  Outcome: Progressing     Problem: PAIN - ADULT  Goal: Verbalizes/displays adequate comfort level or baseline comfort level  Description: Interventions:  - Encourage patient to monitor pain and request assistance  - Assess pain using appropriate pain scale  - Administer analgesics based on type and severity of pain and evaluate response  - Implement non-pharmacological measures as appropriate and evaluate response  - Consider cultural and social influences on pain and pain management  - Notify physician/advanced practitioner if interventions unsuccessful or patient reports new pain  8/6/2020 1129 by Tiago Benito RN  Outcome: Progressing  8/6/2020 0354 by Tiago Benito RN  Outcome: Progressing     Problem: INFECTION - ADULT  Goal: Absence or prevention of progression during hospitalization  Description: INTERVENTIONS:  - Assess and monitor for signs and symptoms of infection  - Monitor lab/diagnostic results  - Monitor all insertion sites, i e  indwelling lines, tubes, and drains  - Monitor endotracheal if appropriate and nasal secretions for changes in amount and color  - [place] appropriate cooling/warming therapies per order  - Administer medications as ordered  - Instruct and encourage patient and family to use good hand hygiene technique  - Identify and instruct in appropriate isolation precautions for identified infection/condition  Outcome: Progressing  Goal: Absence of fever/infection during neutropenic period  Description: INTERVENTIONS:  - Monitor WBC    Outcome: Progressing     Problem: SAFETY ADULT  Goal: Patient will remain free of falls  Description: INTERVENTIONS:  - Assess patient frequently for physical needs  -  Identify cognitive and physical deficits and behaviors that affect risk of falls    -  [place] fall precautions as indicated by assessment   - Educate patient/family on patient safety including physical limitations  - Instruct patient to call for assistance with activity based on assessment  - Modify environment to reduce risk of injury  - Consider OT/PT consult to assist with strengthening/mobility  8/6/2020 1129 by Cele Paz RN  Outcome: Progressing  8/6/2020 0354 by Cele Paz RN  Outcome: Progressing  Goal: Maintain or return to baseline ADL function  Description: INTERVENTIONS:  -  Assess patient's ability to carry out ADLs; assess patient's baseline for ADL function and identify physical deficits which impact ability to perform ADLs (bathing, care of mouth/teeth, toileting, grooming, dressing, etc )  - Assess/evaluate cause of self-care deficits   - Assess range of motion  - Assess patient's mobility; develop plan if impaired  - Assess patient's need for assistive devices and provide as appropriate  - Encourage maximum independence but intervene and supervise when necessary  - Involve family in performance of ADLs  - Assess for home care needs following discharge   - Consider OT consult to assist with ADL evaluation and planning for discharge  - Provide patient education as appropriate  Outcome: Progressing  Goal: Maintain or return mobility status to optimal level  Description: INTERVENTIONS:  - Assess patient's baseline mobility status (ambulation, transfers, stairs, etc )    - Identify cognitive and physical deficits and behaviors that affect mobility  - Identify mobility aids required to assist with transfers and/or ambulation (gait belt, sit-to-stand, lift, walker, cane, etc )  - [place] fall precautions as indicated by assessment  - Record patient progress and toleration of activity level on Mobility SBAR; progress patient to next Phase/Stage  - Instruct patient to call for assistance with activity based on assessment  - Consider rehabilitation consult to assist with strengthening/weightbearing, etc   Outcome: Progressing     Problem: DISCHARGE PLANNING  Goal: Discharge to home or other facility with appropriate resources  Description: INTERVENTIONS:  - Identify barriers to discharge w/patient and caregiver  - Arrange for needed discharge resources and transportation as appropriate  - Identify discharge learning needs (meds, wound care, etc )  - Arrange for interpretive services to assist at discharge as needed  - Refer to Case Management Department for coordinating discharge planning if the patient needs post-hospital services based on physician/advanced practitioner order or complex needs related to functional status, cognitive ability, or social support system  Outcome: Progressing     Problem: Knowledge Deficit  Goal: Patient/family/caregiver demonstrates understanding of disease process, treatment plan, medications, and discharge instructions  Description: Complete learning assessment and assess knowledge base    Interventions:  - Provide teaching at level of understanding  - Provide teaching via preferred learning methods  Outcome: Progressing     Problem: Prexisting or High Potential for Compromised Skin Integrity  Goal: Skin integrity is maintained or improved  Description: INTERVENTIONS:  - Identify patients at risk for skin breakdown  - Assess and monitor skin integrity  - Assess and monitor nutrition and hydration status  - Monitor labs   - Assess for incontinence   - Turn and reposition patient  - Assist with mobility/ambulation  - Relieve pressure over bony prominences  - Avoid friction and shearing  - Provide appropriate hygiene as needed including keeping skin clean and dry  - Evaluate need for skin moisturizer/barrier cream  - Collaborate with interdisciplinary team   - Patient/family teaching  - Consider wound care consult   Outcome: Progressing     Problem: CARDIOVASCULAR - ADULT  Goal: Maintains optimal cardiac output and hemodynamic stability  Description: INTERVENTIONS:  - Monitor I/O, vital signs and rhythm  - Monitor for S/S and trends of decreased cardiac output  - Administer and titrate ordered vasoactive medications to optimize hemodynamic stability  - Assess quality of pulses, skin color and temperature  - Assess for signs of decreased coronary artery perfusion  - Instruct patient to report change in severity of symptoms  Outcome: Progressing  Goal: Absence of cardiac dysrhythmias or at baseline rhythm  Description: INTERVENTIONS:  - Continuous cardiac monitoring, vital signs, obtain 12 lead EKG if ordered  - Administer antiarrhythmic and heart rate control medications as ordered  - Monitor electrolytes and administer replacement therapy as ordered  Outcome: Progressing

## 2020-08-07 NOTE — CONSULTS
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  2, normocytic anemia    HISTORY OF PRESENT ILLNESS:  Vimal Hernandez is a 79 y o  male who has a complicated medical history, including anemia and neuroendocrine tumor, and history of hypertension, carcinoid syndrome, AFib, presents with chest pain, symptom has been better since admission  Hematology oncology was consulted be due to history of cancer and anemia    Patient reported no bleeding, overall doing okay  Body weight is stable, carcinoid syndrome is under control  Do not feel lightheadedness  No abdominal pain abdominal distension  Patient is a nonsmoker            ASSESSMENT/PLAN:  Vimal Hernandez is a 79 y o  male with:    1) neuroendocrine tumor metastatic liver - well controlled, last CT scan in 04/2020 showed condition is stable  2) carcinoid syndrome - under control, no active symptoms  3) normocytic anemia - chronic, previously considered due to CKD, hemoglobin 8 5, at baseline  Overall patient's condition is stable from Hematology Oncology standpoint, there will be no further workup or treatment needed  Okay to discharge from heme Onc standpoint  minutes were spent face to face with patient with greater than 50% of the time spent in counseling or coordination of care including discussions of treatment instructions  All of the patient's questions were answered to their satisfactory during this discussion         Antonia Carlos MD PhD  Hematology / Oncology              PROBLEM LIST:  Patient Active Problem List   Diagnosis    Type 2 diabetes mellitus with hyperglycemia, with long-term current use of insulin (HCC)    Essential hypertension    Mixed hyperlipidemia    Iron deficiency anemia secondary to inadequate dietary iron intake    Syncope and collapse    Liver mass    Neuroendocrine tumor    Acute cystitis without hematuria    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)    Pressure injury of right heel, unstageable (HCC)    Atherosclerosis of artery of extremity with ulceration (HCC)    Carcinoid syndrome (HCC)    Urinary catheter in place    Malignant neuroendocrine neoplasm ([address] )    UTI due to extended-spectrum beta lactamase (ESBL) producing Escherichia coli    Hypomagnesemia    Normocytic anemia    Goals of care, counseling/discussion    Hyperparathyroid bone disease (HCC)    Clostridium difficile diarrhea    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    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    Low TSH level    Severe protein-calorie malnutrition (HCC)    Diastolic dysfunction    Premature atrial complexes    Bradycardia    Generalized weakness    Recurrent UTI    Gastroesophageal reflux disease without esophagitis    Ureterolithiasis    Obstructive uropathy    Gait difficulty    Coronary artery disease    Azotemia    Metabolic acidosis    Hypertensive kidney disease with stage 4 chronic kidney disease (HCC)    Stage 4 chronic kidney disease (HCC)    Edema    Chest pain    Chest pressure    Urinary retention    Elevated d-dimer                     PAST MEDICAL HISTORY:   has a past medical history of Acute pancreatitis without infection or necrosis (9/26/2019), Anemia of chronic renal failure, BPH (benign prostatic hyperplasia), Chronic kidney disease (CKD), stage IV (severe), Coronary artery disease, DJD (degenerative joint disease), Essential hypertension, Gait disturbance, Generalized weakness, GERD (gastroesophageal reflux disease), Gout, History of transfusion, Hydronephrosis, Hyperlipidemia, Hypertension, Liver cancer, Pressure injury of skin, Proteinuria, Renal disorder, Retention of urine, Thrombophlebitis migrans, and Type 2 diabetes mellitus  PAST SURGICAL HISTORY:   has a past surgical history that includes IR image guided biopsy/aspiration liver (1/24/2019); Coronary angioplasty with stent; IR tube placement monet (9/6/2019); CHOLECYSTECTOMY LAPAROSCOPIC (N/A, 2/7/2020); Cholecystectomy; pr cystourethroscopy,ureter catheter (Left, 4/21/2020); [place] retrograde pyelogram (5/21/2020); and pr cysto/uretero w/lithotripsy &indwell stent insrt (Left, 5/21/2020)      CURRENT MEDICATIONS  Scheduled Meds:  Current Facility-Administered Medications   Medication Dose Route Frequency Provider Last Rate    acetaminophen  975 mg Oral Q6H PRN [facility], PA-C      aluminum-magnesium hydroxide-simethicone  30 mL Oral Q6H PRN [facility], PA-C      aspirin  81 mg Oral Daily [place], [place]      atorvastatin  40 mg Oral QPM [facility], PA-C      b complex-vitamin C-folic acid  1 capsule Oral Daily With [address] [place], [place]      cholecalciferol  1,000 Units Oral Daily [place], [place]      furosemide  40 mg Oral Daily [place], [place]      insulin detemir  10 Units Subcutaneous HS [place], [place]      insulin lispro  1-6 Units Subcutaneous TID AC [facility], PA-C      insulin lispro  1-6 Units Subcutaneous HS [facility], PA-C      insulin lispro  4 Units Subcutaneous TID [facility], PA-C      magnesium hydroxide  30 mL Oral Daily PRN [facility], PA-C      magnesium oxide  400 mg Oral BID [facility], PA-C      metoprolol tartrate  12 5 mg Oral Q12H [facility] [place], [place]      NIFEdipine ER  60 mg Oral Daily [place], [place]      ondansetron  4 mg Intravenous Q6H PRN [facility], PA-C      pantoprazole  40 mg Oral Early Morning [place], [place]      sodium bicarbonate  650 mg Oral TID [facility], PA-C      tamsulosin  0 4 mg Oral Daily With [address], [place]       Continuous Infusions:   PRN Meds:   acetaminophen    aluminum-magnesium hydroxide-simethicone    magnesium hydroxide    ondansetron    SOCIAL HISTORY:   reports that he has never smoked  He has never used smokeless tobacco  He reports previous alcohol use  He reports that he does not use drugs  FAMILY HISTORY:  family history includes Cancer in his brother, maternal grandmother, mother, and paternal aunt; Hypertension in his father  ALLERGIES:  has No Known Allergies  REVIEW OF SYSTEMS:  Please note that a 14-point review of systems was performed to include Constitutional, HEENT, Respiratory, CVS, GI, , Musculoskeletal, Integumentary, Neurologic, Rheumatologic, Endocrinologic, Psychiatric, Lymphatic, and Hematologic/Oncologic systems were reviewed and are negative unless otherwise stated in HPI  Positive and negative findings pertinent to this evaluation are incorporated into the history of present illness  PHYSICAL EXAMINATION:  Vital Signs: /67 (BP Location: Left arm)   Pulse 75   Temp 97 7 °F (36 5 °C)   Resp 20   Ht 5' 8" (1 727 m)   Wt 81 2 kg (179 lb 0 2 oz)   SpO2 96%   BMI 27 22 kg/m²   Body surface area is 1 95 meters squared  Ht Readings from Last 3 Encounters:   08/06/20 5' 8" (1 727 m)   07/23/20 5' 8" (1 727 m)   07/13/20 5' 8" (1 727 m)       Wt Readings from Last 3 Encounters:   08/06/20 81 2 kg (179 lb 0 2 oz)   07/23/20 75 3 kg (166 lb)   07/13/20 76 2 kg (168 lb)        Temp Readings from Last 3 Encounters:   08/07/20 97 7 °F (36 5 °C)   07/28/20 98 °F (36 7 °C) (Tympanic)   07/13/20 98 1 °F (36 7 °C)        BP Readings from Last 3 Encounters:   08/07/20 139/67   07/28/20 167/75   07/23/20 140/76         Pulse Readings from Last 3 Encounters:   08/07/20 75   07/28/20 67   07/23/20 60       Appears comfortable, no palpable lymphadenopathy          Constitutional: Alert and oriented    HEENT: Anicteric, PERRLA  Chest: Decreased breathing sound bilaterally, No wheezes/rales/rhonchi  CVS: Regular rhythm  Normal rate  Abdomen: Soft, nontender, nondistended  No palpable organomegaly  Extremities: No cyanosis/clubbing/edema  Integumentary: No obvious rashes or bruises  Musculoskeletal: No obvious bony or joint deformities  Psychiatric: Appropriate affect and mood  Lymph Node Survey: No palpable preauricular, submandibular, cervical, supraclavicular, axillary, epitrochlear or inguinal lymphadenopathy  LABS:  Results from last 7 days   Lab Units 08/06/20  1206 08/06/20  0814 08/06/20  0436   TROPONIN I ng/mL <0 02 <0 02 <0 02     CBC with diff:   Results from last 7 days   Lab Units 08/07/20  0634 08/06/20  0444 08/06/20  0050   WBC Thousand/uL 7 91 7 76 8 74   HEMOGLOBIN g/dL 8 5* 8 3* 8 6*   HEMATOCRIT % 26 2* 25 2* 25 9*   MCV fL 95 93 93   PLATELETS Thousands/uL 139* 124* 136*   MCH pg 30 9 30 6 30 7   MCHC g/dL 32 4 32 9 33 2   RDW % 13 9 13 6 13 4   MPV fL 10 8 10 9 10 9   NRBC AUTO /100 WBCs  --  0 0       CMP:  Results from last 7 days   Lab Units 08/07/20  0634 08/06/20  0444 08/06/20  0050   POTASSIUM mmol/L 4 2 3 9 3 5   CHLORIDE mmol/L 105 103 102   CO2 mmol/L 27 28 26   BUN mg/dL 51* 47* 47*   CREATININE mg/dL 4 00* 3 66* 3 59*   CALCIUM mg/dL 8 1* 8 4 8 3   AST U/L 14  --  16   ALT U/L 23  --  24   ALK PHOS U/L 116  --  126*   EGFR ml/min/1 73sq m 13 15 15       Results from last 7 days   Lab Units 08/06/20  0815   PTT seconds 83*           Invalid input(s): TNI,  PCT              IMAGING:  NM lung perfusion imaging   Final Result      The probability for pulmonary embolus is low  Workstation performed: [id number]         XR chest 1 view portable   Final Result      No acute cardiopulmonary disease              Workstation performed: [id number]

## 2020-08-07 NOTE — UTILIZATION REVIEW
Notification of Inpatient Admission/Inpatient Authorization Request   This is a Notification of Inpatient Admission for [address]  Be advised that this patient was admitted to our facility under Inpatient Status  Contact Duke Knight at [phone] for additional admission information  [facility] DEPT DEDICATED Mckenna Pelaez [phone]  Patient Name:   Perri Miguel   YOB: 1940       [address]   [address]:   [address]   Tax ID: [id number]  NPI: [id number] Attending Provider/NPI:  Phone:  Address: Wojciech Pina [[id number]]  [phone]  Same as FOUZIA/Fiona Lucas 1106 of Service Code: 24     Place of Service Name:  [address] [place] [address]   Start Date: 8/6/20 0154 Discharge Date & Time: No discharge date for patient encounter  Type of Admission: Inpatient Status Discharge Disposition   (if discharged): Home with [address] [place] [employer] [place] [employer] [facility]   Patient Diagnoses: Atrial fibrillation [facility]) [I48 91]  Chest pain [R07 9]  Positive D dimer [R79 89]  Type 2 diabetes mellitus with hyperglycemia, with long-term current use of insulin (HCC) [E11 65, Z79 4]  Pressure injury of right heel, unstageable ([facility] [address] ) Nirali Burns     Orders: Admission Orders (From admission, onward)     Ordered        08/06/20 0154  Inpatient Admission  Once                    Assigned Utilization Review Contact: Duke Knight  Utilization   Network Utilization Review Department  Phone: [phone]; Fax [phone]  Email: [email or web address]   ATTENTION PAYERS: Please call the assigned Utilization  directly with any questions or concerns ALL voicemails in the department are confidential  Send all requests for admission clinical reviews, approved or denied determinations and any other requests to dedicated fax number belonging to the campus where the patient is receiving treatment

## 2020-08-07 NOTE — UTILIZATION REVIEW
Initial Clinical Review    Admission: Date/Time/Statement:   Admission Orders (From admission, onward)     Ordered        08/06/20 0154  Inpatient Admission  Once                   Orders Placed This Encounter   Procedures    Inpatient Admission     Standing Status:   Standing     Number of Occurrences:   1     Order Specific Question:   Admitting Physician     Answer:   Angelita Drummond [[id number]]     Order Specific Question:   Level of Care     Answer:   Med Surg [[id number]]     Order Specific Question:   Estimated length of stay     Answer:   More than 2 Midnights     Order Specific Question:   Certification     Answer:   I certify that inpatient services are medically necessary for this patient for a duration of greater than two midnights  See H&P and MD Progress Notes for additional information about the patient's course of treatment  ED Arrival Information     Expected Arrival Acuity Means of Arrival Escorted By Service Admission Type    - 8/6/2020 00:32 Urgent Ambulance [facility] Hospitalist Urgent    Arrival Complaint    cp        Chief Complaint   Patient presents with    Chest Pain     As per EMS, patient c/o nonradiating substernal cp started @ 11 pm, sob, EMS gave 4 aspirins, 1 nitro, patient reports "no cp at this time"     Assessment/Plan: 78 yo male to ED from home w/ L sided chest pressure w/ assoc SOB PTA   Pain resolved w/ ASA and SL NTG   Noted to be in afib on arrival , no prior hx   Undergoing chemo for neuroendocrine tumor  Admitted IP status w/ Chest pressure   Plan to trend Camelia johnson 2 76 unable to obtain CTA sec to CKD , heparin gtt started , VQ pending , cont asa, lopressor and nifedipine   8/7 IM note   L sided pressure subsided after as and ntg   Elevated d-dimer, VQ scan low probability   Heparin DC'd  Given anemia hematology to evaluate   PE : diminished BS     8/6 Cardiology consult   questionable afib EKG SR w  PACs , tele , r/o PE   DC heparin if VQ neg    CP conservative mngt , cont asa , atorvastatin and lopressor  ED Triage Vitals   Temperature Pulse Respirations Blood Pressure SpO2   08/06/20 0131 08/06/20 0035 08/06/20 0035 08/06/20 0035 08/06/20 0035   97 9 °F (36 6 °C) (!) 108 18 124/61 96 %      Temp Source Heart Rate Source Patient Position - Orthostatic VS BP Location FiO2 (%)   08/06/20 0131 08/06/20 0035 08/06/20 0045 08/06/20 0035 --   Oral Monitor Sitting Left arm       Pain Score       08/06/20 0320       No Pain          Wt Readings from Last 1 Encounters:   08/06/20 81 2 kg (179 lb 0 2 oz)     Additional Vital Signs:   08/07/20 1005      80      164/62                08/07/20 0700   97 7 °F (36 5 °C)   77   17   149/71   102   97 %   None (Room air)   Lying    08/07/20 0004   98 °F (36 7 °C)   77   18   131/66   92   93 %   None (Room air)   Lying    08/06/20 2348                     None (Room air)       08/06/20 2207      80      122/57            Lying    08/06/20 1506   97 7 °F (36 5 °C)   79   20   116/60   90   97 %   None (Room air)   Lying    08/06/20 0700   97 7 °F (36 5 °C)   77   19   146/67   96   97 %   None (Room air)   Lying    08/06/20 0230      81   22   130/72   92   94 %   None (Room air)   Lying    08/06/20 0200      78   19   113/61   83   93 %   None (Room air)   Sitting    08/06/20 0131   97 9 °F (36 6 °C)                         08/06/20 0100      86   18   127/60   86   95 %   None (Room air)   Sitting    08/06/20 0055                     None (Room air)       08/06/20 0045      88   20   129/58   84   96 %   None (Room air         Pertinent Labs/Diagnostic Test Results:   8/6 EKG:  Afib at 94bpm  T wave inversion in lateral leads  8/6 PCXR No acute cardiopulmonary disease  8/6 NM VQ scan The probability for pulmonary embolus is low  8/6 EKG  NSR  Results from last 7 days   Lab Units 08/07/20  0634 08/06/20  0444 08/06/20  0050   WBC Thousand/uL 7 91 7 76 8 74   HEMOGLOBIN g/dL 8 5* 8 3* 8 6* HEMATOCRIT % 26 2* 25 2* 25 9*   PLATELETS Thousands/uL 139* 124* 136*   NEUTROS ABS Thousands/µL  --  5 91 7 23     Results from last 7 days   Lab Units 08/07/20  0634   RETIC CT ABS  67,100   RETIC CT PCT % 2 44*     Results from last 7 days   Lab Units 08/07/20  0634 08/06/20  0444 08/06/20  0050   SODIUM mmol/L 141 142 139   POTASSIUM mmol/L 4 2 3 9 3 5   CHLORIDE mmol/L 105 103 102   CO2 mmol/L 27 28 26   ANION GAP mmol/L 9 11 11   BUN mg/dL 51* 47* 47*   CREATININE mg/dL 4 00* 3 66* 3 59*   EGFR ml/min/1 73sq m 13 15 15   CALCIUM mg/dL 8 1* 8 4 8 3   MAGNESIUM mg/dL  --  1 9  --      Results from last 7 days   Lab Units 08/07/20  0634 08/06/20  0050   AST U/L 14 16   ALT U/L 23 24   ALK PHOS U/L 116 126*   TOTAL PROTEIN g/dL 5 6* 6 4   ALBUMIN g/dL 2 5* 3 2*   TOTAL BILIRUBIN mg/dL 0 40 0 50     Results from last 7 days   Lab Units 08/07/20  0749 08/06/20  2026 08/06/20  1557 08/06/20  1129 08/06/20  0738   POC GLUCOSE mg/dl 54* 279* 226* 201* 110     Results from last 7 days   Lab Units 08/07/20  0634 08/06/20  0444 08/06/20  0050   GLUCOSE RANDOM mg/dL 63* 133 192*     BETA-HYDROXYBUTYRATE   Date Value Ref Range Status   02/05/2019 0 11 0 02 - 0 27 mmol/L Final     Results from last 7 days   Lab Units 08/06/20  1206 08/06/20  0814 08/06/20  0436 08/06/20  0050   TROPONIN I ng/mL <0 02 <0 02 <0 02 <0 02     Results from last 7 days   Lab Units 08/06/20  0050   D-DIMER QUANTITATIVE ug/ml FEU 2 76*     Results from last 7 days   Lab Units 08/06/20  0815   PTT seconds 83*     ED Treatment:   Medication Administration from 08/06/2020 0032 to 08/06/2020 0310       Date/Time Order Dose Route Action     08/06/2020 0204 heparin (porcine) 25,000 units in 250 mL infusion (premix) 12 Units/kg/hr Intravenous New Bag     08/06/2020 0216 potassium chloride (K-DUR,KLOR-CON) CR tablet 20 mEq 20 mEq Oral Given        Past Medical History:   Diagnosis Date    Acute pancreatitis without infection or necrosis 9/26/2019    Anemia of chronic renal failure     BPH (benign prostatic hyperplasia)     Chronic kidney disease (CKD), stage IV (severe)     Coronary artery disease     DJD (degenerative joint disease)     Essential hypertension     Gait disturbance     Generalized weakness     GERD (gastroesophageal reflux disease)     Gout     History of transfusion     Hydronephrosis     Hyperlipidemia     Hypertension     Liver cancer     Pressure injury of skin     Proteinuria     Renal disorder     Retention of urine     Thrombophlebitis migrans     Type 2 diabetes mellitus      Present on Admission:   Chest pressure   Normocytic anemia   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)   Hypertensive kidney disease with stage 4 chronic kidney disease (HCC)   Edema   Gastroesophageal reflux disease without esophagitis   Urinary retention   Elevated d-dimer      Admitting Diagnosis: Atrial fibrillation (HCC) [I48 91]  Chest pain [R07 9]  Positive D dimer [R79 89]  Type 2 diabetes mellitus with hyperglycemia, with long-term current use of insulin (HCC) [E11 65, Z79 4]  Pressure injury of right heel, unstageable (HCC) [L89 610]  Age/Sex: 79 y o  male  Admission Orders:  Scheduled Medications:  aspirin, 81 mg, Oral, Daily  atorvastatin, 40 mg, Oral, QPM  b complex-vitamin C-folic acid, 1 capsule, Oral, Daily With Dinner  cholecalciferol, 1,000 Units, Oral, Daily  furosemide, 40 mg, Oral, Daily  insulin detemir, 10 Units, Subcutaneous, HS  insulin lispro, 1-6 Units, Subcutaneous, TID AC  insulin lispro, 1-6 Units, Subcutaneous, HS  insulin lispro, 4 Units, Subcutaneous, TID AC  magnesium oxide, 400 mg, Oral, BID  metoprolol tartrate, 12 5 mg, Oral, Q12H NATACHA  NIFEdipine ER, 60 mg, Oral, Daily  pantoprazole, 40 mg, Oral, Early Morning  sodium bicarbonate, 650 mg, Oral, TID AC  tamsulosin, 0 4 mg, Oral, Daily With Dinner      Continuous IV Infusions:     PRN Meds:  acetaminophen, 975 mg, Oral, Q6H PRN  aluminum-magnesium hydroxide-simethicone, 30 mL, Oral, Q6H PRN  magnesium hydroxide, 30 mL, Oral, Daily PRN  ondansetron, 4 mg, Intravenous, Q6H PRN    Fingerstick ac and hs  Tele   EKG prn cp   IP CONSULT TO CARDIOLOGY  IP CONSULT TO HEMATOLOGY    Network Utilization Review Department  [email or web address]  ATTENTION: Please call with any questions or concerns to [phone] and carefully listen to the prompts so that you are directed to the right person  All voicemails are confidential   Ankush Tucker all requests for admission clinical reviews, approved or denied determinations and any other requests to dedicated fax number below belonging to the campus where the patient is receiving treatment   List of dedicated fax numbers for the Facilities:  [address] DENIALS (Administrative/Medical Necessity) [phone]   [address] (Maternity/NICU/Pediatrics) [phone]   Lorilee Baptise [phone]   Areta Sell [phone]   Iasbelle Pitch [phone]   Kaia Jest [phone]   [address] [place] [address] [phone]   Amarjit Jeramie [phone]   [address] [facility] [address]  [address] [facility] [address] [facility] [phone]

## 2020-08-07 NOTE — ASSESSMENT & PLAN NOTE
· Follows with Dr Kodi Weaver as outpt  · Currently undergoing monthly chemo injections at [facility]  · Given anemia, Hematology to evaluate    Results from last 7 days   Lab Units 08/07/20  0634 08/06/20  0444 08/06/20  0050   HEMOGLOBIN g/dL 8 5* 8 3* 8 6*

## 2020-08-07 NOTE — ASSESSMENT & PLAN NOTE
· Left-sided chest pressure prior to admission subsided after aspirin and nitroglycerin  · Elevated D-dimer however V/Q scan low probability for pulmonary embolism and heparin infusion discontinued  · Question of dysrhythmia however no evidence of atrial fibrillation after cardiology consultation  · Question of anemia as cause    Hematology to evaluate    Results from last 7 days   Lab Units 08/06/20  1206 08/06/20  0814 08/06/20  0436 08/06/20  0050   TROPONIN I ng/mL <0 02 <0 02 <0 02 <0 02

## 2020-08-07 NOTE — ASSESSMENT & PLAN NOTE
Lab Results   Component Value Date    HGBA1C 7 5 (A) 06/18/2020     Recent Labs     08/06/20  2026 08/07/20  0749 08/07/20  1004 08/07/20  1059   POCGLU 279* 54* 250* 275*     · Diabetes mellitus on levemir and lispro

## 2020-08-07 NOTE — PLAN OF CARE
Problem: Potential for Falls  Goal: Patient will remain free of falls  Description: INTERVENTIONS:  - Assess patient frequently for physical needs  -  Identify cognitive and physical deficits and behaviors that affect risk of falls  -  [place] fall precautions as indicated by assessment   - Educate patient/family on patient safety including physical limitations  - Instruct patient to call for assistance with activity based on assessment  - Modify environment to reduce risk of injury  - Consider OT/PT consult to assist with strengthening/mobility  Outcome: Progressing     Problem: PAIN - ADULT  Goal: Verbalizes/displays adequate comfort level or baseline comfort level  Description: Interventions:  - Encourage patient to monitor pain and request assistance  - Assess pain using appropriate pain scale  - Administer analgesics based on type and severity of pain and evaluate response  - Implement non-pharmacological measures as appropriate and evaluate response  - Consider cultural and social influences on pain and pain management  - Notify physician/advanced practitioner if interventions unsuccessful or patient reports new pain  Outcome: Progressing     Problem: SAFETY ADULT  Goal: Patient will remain free of falls  Description: INTERVENTIONS:  - Assess patient frequently for physical needs  -  Identify cognitive and physical deficits and behaviors that affect risk of falls    -  [place] fall precautions as indicated by assessment   - Educate patient/family on patient safety including physical limitations  - Instruct patient to call for assistance with activity based on assessment  - Modify environment to reduce risk of injury  - Consider OT/PT consult to assist with strengthening/mobility  Outcome: Progressing     Problem: SAFETY ADULT  Goal: Maintain or return to baseline ADL function  Description: INTERVENTIONS:  -  Assess patient's ability to carry out ADLs; assess patient's baseline for ADL function and identify physical deficits which impact ability to perform ADLs (bathing, care of mouth/teeth, toileting, grooming, dressing, etc )  - Assess/evaluate cause of self-care deficits   - Assess range of motion  - Assess patient's mobility; develop plan if impaired  - Assess patient's need for assistive devices and provide as appropriate  - Encourage maximum independence but intervene and supervise when necessary  - Involve family in performance of ADLs  - Assess for home care needs following discharge   - Consider OT consult to assist with ADL evaluation and planning for discharge  - Provide patient education as appropriate  Outcome: Progressing     Problem: DISCHARGE PLANNING  Goal: Discharge to home or other facility with appropriate resources  Description: INTERVENTIONS:  - Identify barriers to discharge w/patient and caregiver  - Arrange for needed discharge resources and transportation as appropriate  - Identify discharge learning needs (meds, wound care, etc )  - Arrange for interpretive services to assist at discharge as needed  - Refer to Case Management Department for coordinating discharge planning if the patient needs post-hospital services based on physician/advanced practitioner order or complex needs related to functional status, cognitive ability, or social support system  Outcome: Progressing     Problem: CARDIOVASCULAR - ADULT  Goal: Maintains optimal cardiac output and hemodynamic stability  Description: INTERVENTIONS:  - Monitor I/O, vital signs and rhythm  - Monitor for S/S and trends of decreased cardiac output  - Administer and titrate ordered vasoactive medications to optimize hemodynamic stability  - Assess quality of pulses, skin color and temperature  - Assess for signs of decreased coronary artery perfusion  - Instruct patient to report change in severity of symptoms  Outcome: Progressing     Problem: CARDIOVASCULAR - ADULT  Goal: Absence of cardiac dysrhythmias or at baseline rhythm  Description: INTERVENTIONS:  - Continuous cardiac monitoring, vital signs, obtain 12 lead EKG if ordered  - Administer antiarrhythmic and heart rate control medications as ordered  - Monitor electrolytes and administer replacement therapy as ordered  Outcome: Progressing     Problem: Prexisting or High Potential for Compromised Skin Integrity  Goal: Skin integrity is maintained or improved  Description: INTERVENTIONS:  - Identify patients at risk for skin breakdown  - Assess and monitor skin integrity  - Assess and monitor nutrition and hydration status  - Monitor labs   - Assess for incontinence   - Turn and reposition patient  - Assist with mobility/ambulation  - Relieve pressure over bony prominences  - Avoid friction and shearing  - Provide appropriate hygiene as needed including keeping skin clean and dry  - Evaluate need for skin moisturizer/barrier cream  - Collaborate with interdisciplinary team   - Patient/family teaching  - Consider wound care consult   Outcome: Progressing

## 2020-08-07 NOTE — PROGRESS NOTES
Progress Note - cO Mcclendon 1940, 79 y o  male MRN: [id number]    Unit/Bed#: -01 Encounter: [id number]    Primary Care Provider: RIKA Mendes   Date and time admitted to hospital: 8/6/2020 12:32 AM        * Chest pressure  Assessment & Plan  · Left-sided chest pressure prior to admission subsided after aspirin and nitroglycerin  · Elevated D-dimer however V/Q scan low probability for pulmonary embolism and heparin infusion discontinued  · Question of dysrhythmia however no evidence of atrial fibrillation after cardiology consultation  · Question of anemia as cause  Hematology to evaluate    Results from last 7 days   Lab Units 08/06/20  1206 08/06/20  0814 08/06/20  0436 08/06/20  0050   TROPONIN I ng/mL <0 02 <0 02 <0 02 <0 02       Elevated d-dimer  Assessment & Plan  · Elevated D-dimer with chest pain    V/Q scan low probability for PE    Urinary retention  Assessment & Plan  · Chronic urinary retention on tamsulosin  · Follows with Dr Jeffery Carl    Hypertensive kidney disease with stage 4 chronic kidney disease ([facility] [address] )  Assessment & Plan  · CKD 4 currently at baseline    Results from last 7 days   Lab Units 08/07/20  0634 08/06/20  0444 08/06/20  0050   BUN mg/dL 51* 47* 47*   CREATININE mg/dL 4 00* 3 66* 3 59*       Gastroesophageal reflux disease without esophagitis  Assessment & Plan  · GERD continue pantoprazole    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 [facility])  Assessment & Plan  · Follows with Dr Angélica Ramos as outpt  · Currently undergoing [facility] at [facility]  · Given anemia, Hematology to evaluate    Results from last 7 days   Lab Units 08/07/20  0634 08/06/20  0444 08/06/20  0050   HEMOGLOBIN g/dL 8 5* 8 3* 8 6*       Type 2 diabetes mellitus with hyperglycemia, with long-term current use of insulin [facility])  Assessment & Plan  Lab Results   Component Value Date    HGBA1C 7 5 (A) 06/18/2020     Recent Labs     08/06/20  2026 08/07/20  0749 08/07/20  1004 08/07/20  1059   POCGLU 279* 54* 250* 275*     · Diabetes mellitus on levemir and lispro      VTE Pharmacologic Prophylaxis: Pharmacologic VTE Prophylaxis contraindicated due to anemia    Patient Centered Rounds: I have performed bedside rounds with nursing staff today  Discussions with Specialists or Other Care Team Provider:   Education and Discussions with Family / Patient:  Spouse    Time Spent for Care: 25 mins  More than 50% of total time spent on counseling and coordination of care as described above  Current Length of Stay: 1 day(s)  Current Patient Status: Inpatient     Certification Statement: The patient will continue to require additional inpatient hospital stay due to Chest pressure  Discharge Plan / Estimated Discharge Date: TBD    Code Status: Level 1 - Full Code  ______________________________________________________________________________    Subjective:   Patient seen and examined  Feeling pretty good  No further chest pain  Objective:   Vitals: Blood pressure 164/62, pulse 80, temperature 97 7 °F (36 5 °C), temperature source Oral, resp  rate 17, height 5' 8" (1 727 m), weight 81 2 kg (179 lb 0 2 oz), SpO2 97 %      Physical Exam:   General appearance: alert, appears stated age and cooperative  Head: Normocephalic, without obvious abnormality, atraumatic  Lungs: diminished breath sounds  Heart: regular rate and rhythm  Abdomen: soft, non-tender, positive bowel sounds   Back: negative, range of motion normal  Extremities: extremities atraumatic, no cyanosis or edema  Neurologic: Grossly normal    Additional Data:   Labs:  Results from last 7 days   Lab Units 08/07/20  0634 08/06/20  0444 08/06/20  0050   WBC Thousand/uL 7 91 7 76 8 74   HEMOGLOBIN g/dL 8 5* 8 3* 8 6*   HEMATOCRIT % 26 2* 25 2* 25 9*   MCV fL 95 93 93   PLATELETS Thousands/uL 139* 124* 136*     Results from last 7 days   Lab Units 08/07/20  0634 08/06/20  0444 08/06/20  0050   SODIUM mmol/L 141 142 139   POTASSIUM mmol/L 4 2 3 9 3 5   CHLORIDE mmol/L 105 103 102   CO2 mmol/L 27 28 26   ANION GAP mmol/L 9 11 11   BUN mg/dL 51* 47* 47*   CREATININE mg/dL 4 00* 3 66* 3 59*   CALCIUM mg/dL 8 1* 8 4 8 3   ALBUMIN g/dL 2 5*  --  3 2*   TOTAL BILIRUBIN mg/dL 0 40  --  0 50   ALK PHOS U/L 116  --  126*   ALT U/L 23  --  24   AST U/L 14  --  16   EGFR ml/min/1 73sq m 13 15 15   GLUCOSE RANDOM mg/dL 63* 133 192*     Results from last 7 days   Lab Units 08/06/20  0444   MAGNESIUM mg/dL 1 9     Results from last 7 days   Lab Units 08/06/20  1206 08/06/20  0814 08/06/20  0436   TROPONIN I ng/mL <0 02 <0 02 <0 02              Results from last 7 days   Lab Units 08/07/20  1059 08/07/20  1004 08/07/20  0749 08/06/20  2026 08/06/20  1557 08/06/20  1129 08/06/20  0738   POC GLUCOSE mg/dl 275* 250* 54* 279* 226* 201* 110             * I Have Reviewed All Lab Data Listed Above  Cultures:               Imaging:  Imaging Reports Reviewed Today Include:   Xr Chest 1 View Portable    Result Date: 8/6/2020  Impression: No acute cardiopulmonary disease  Workstation performed: [id number]     [facility]    Result Date: 8/6/2020  Impression: The probability for pulmonary embolus is low   Workstation performed: [id number]     Scheduled Meds:  acetaminophen, 975 mg, Oral, Q6H PRN, Luda Jaramillo PA-C  aluminum-magnesium hydroxide-simethicone, 30 mL, Oral, Q6H PRN, Luda Jaramillo PA-C  aspirin, 81 mg, Oral, Daily, Alberto Munoz PA-C  atorvastatin, 40 mg, Oral, QPM, Alberto Munoz PA-C  b complex-vitamin C-folic acid, 1 capsule, Oral, Daily With Dinner, Luda Jaramillo PA-C  cholecalciferol, 1,000 Units, Oral, Daily, Luda Jaramillo PA-C  furosemide, 40 mg, Oral, Daily, Alberto Munoz PA-C  insulin detemir, 10 Units, Subcutaneous, HS, Alberto Munoz PA-C  insulin lispro, 1-6 Units, Subcutaneous, TID AC, Federico Munoz PA-C  insulin lispro, 1-6 Units, Subcutaneous, HS, Alberto Munoz PA-C  insulin lispro, 4 Units, Subcutaneous, TID AC, Sherran Goldmann Bertrand Das PA-C  magnesium hydroxide, 30 mL, Oral, Daily PRN, Simón Morgan PA-C  magnesium oxide, 400 mg, Oral, BID, Alberto Munoz PA-C  metoprolol tartrate, 12 5 mg, Oral, Q12H [address], Alberto Munoz PA-C  NIFEdipine ER, 60 mg, Oral, Daily, Alberto Munoz PA-C  ondansetron, 4 mg, Intravenous, Q6H PRN, Simón Morgan PA-C  pantoprazole, 40 mg, Oral, Early Morning, Alberto Munoz PA-C  sodium bicarbonate, 650 mg, Oral, TID AC, Simón Morgan PA-C  tamsulosin, 0 4 mg, Oral, Daily With Dinner, Simón Morgan PA-C        [address] [facility]  Hospitalist    ** Please Note: This note has been constructed using a voice recognition system   **

## 2020-08-07 NOTE — ASSESSMENT & PLAN NOTE
· CKD 4 currently at baseline    Results from last 7 days   Lab Units 08/07/20  0634 08/06/20  0444 08/06/20  0050   BUN mg/dL 51* 47* 47*   CREATININE mg/dL 4 00* 3 66* 3 59*

## 2020-08-08 PROBLEM — R10.9 ABDOMINAL PAIN: Status: ACTIVE | Noted: 2019-09-04

## 2020-08-08 NOTE — QUICK NOTE
Called by nursing to evaluate patient for severe epigastric pain  Pt reports it is a 9/10 pain  Pt reports his pain started about an hour after eating dinner, and reports he had a  salad  He denies any associated shortness of breath or diaphoresis  Does report some nausea  Pt reports he has a dull aching pain in his epigastrum, and also feels like his abdomen is full and has a pressure  He reports he has had a BM today and is still passing gas  He describes the pain as similar to when he has indigestion  Vitals: temp 97 5, pulse 80, rr 18, bp 158/67, SpO2 96% on RA  Pt is lying flat in bed, appears uncomfortable  Is moaning in pain  Heart: rrr, no murmurs  Lungs: clear to auscultation bilaterally  Abdomen: appears distended  Abdomen is distended, tender to deep palpation in all quadrants  Normoactive bsx4  No rigidity  Legs: no swelling    Upon sitting up, pt reports he feels marginally better  Stat ekg ordered, shows NSR, no st changes, unchanged form previous  Stat troponin ordered to rule out cardiac causes  Mylanta given, added lidocaine viscous  Continue pantoprazole  KUB ordered, pending  Continue to monitor pt

## 2020-08-08 NOTE — CONSULTS
Consultation - Nephrology   Carina Reddy 79 y o  male MRN: [id number]  Unit/Bed#: -01 Encounter: [id number]    Referring PHYSICIAN:  Bacilio Fishman     REASON FOR THE CONSULTATION:  CKD stage 4    DATE OF CONSULTATION:  August 8, 2020    ADMISSION DIAGNOSIS: Chest pressure     CHIEF COMPLAINT     Patient is stage IV CKD being monitored by nephrologist came to emergency room with chest pain and I am being consulted because of worsening renal function    HPI     Patient with complex medical history including stage IV CKD came to hospital with chest pain    According to the patient patient had chest pain day before admission  Pain was associated with shortness of breath and got better with nitro and aspirin  Patient does history of coronary disease that is why came to emergency room was admitted    Patient is symptom free right now but now he has recurrent epigastric pain but review after the eat  He had pain yesterday and today again    He claims he ate some salad and pain got much severe    No chest pain or abdominal pain right now    Patient does have history of neuroendocrine tumor and going on chemo with primary oncologist    He also had a carcinoid syndrome though he was asymptomatic    He does have stage 4-5 CKD and does see Dr Briceno as outpatient regular basis    Also history of coronary artery disease and atrial fibrillation with seems to be well controlled        PAST MEDICAL HISTORY     Past Medical History:   Diagnosis Date    Acute pancreatitis without infection or necrosis 9/26/2019    Anemia of chronic renal failure     BPH (benign prostatic hyperplasia)     Chronic kidney disease (CKD), stage IV (severe)     Coronary artery disease     DJD (degenerative joint disease)     Essential hypertension     Gait disturbance     Generalized weakness     GERD (gastroesophageal reflux disease)     Gout     History of transfusion     Hydronephrosis     Hyperlipidemia     Hypertension     Liver cancer     Pressure injury of skin     Proteinuria     Renal disorder     Retention of urine     Thrombophlebitis migrans     Type 2 diabetes mellitus        PAST SURGICAL HISTORY     Past Surgical History:   Procedure Laterality Date    CHOLECYSTECTOMY      CHOLECYSTECTOMY LAPAROSCOPIC N/A 2/7/2020    Procedure: CHOLECYSTECTOMY LAPAROSCOPIC;  Surgeon:  Anum Ball MD;  Location: [address] MAIN OR;  Service: General    CORONARY ANGIOPLASTY WITH STENT PLACEMENT      FL RETROGRADE PYELOGRAM  5/21/2020    IR IMAGE GUIDED BIOPSY/ASPIRATION LIVER  1/24/2019    IR TUBE PLACEMENT ROQUE  9/6/2019    VA CYSTO/URETERO W/LITHOTRIPSY &INDWELL STENT INSRT Left 5/21/2020    Procedure: CYSTOSCOPY URETEROSCOPY WITH LITHOTRIPSY HOLMIUM LASER, RETROGRADE PYELOGRAM AND INSERTION STENT URETERAL;  Surgeon: Fermin Manzanares MD;  Location: [facility];  Service: Urology    VA CYSTOURETHROSCOPY,URETER CATHETER Left 4/21/2020    Procedure: CYSTOSCOPY WITH INSERTION STENT URETERAL;  Surgeon: Say Santos MD;  Location[facility];  Service: Urology       ALLERGIES     No Known Allergies    SOCIAL HISTORY     Social History     Substance and Sexual Activity   Alcohol Use Not Currently    Alcohol/week: 0 0 standard drinks    Frequency: Never     Social History     Substance and Sexual Activity   Drug Use Never     Social History     Tobacco Use   Smoking Status Never Smoker   Smokeless Tobacco Never Used       FAMILY HISTORY     Family History   Problem Relation Age of Onset    Cancer Mother     Hypertension Father     Cancer Brother     Cancer Maternal Grandmother     Cancer Paternal Aunt        CURRENT MEDICATIONS       Current Facility-Administered Medications:     acetaminophen (TYLENOL) tablet 975 mg, 975 mg, Oral, Q6H PRN, Cony Diallo PA-C    aluminum-magnesium hydroxide-simethicone (MYLANTA) 200-200-20 mg/5 mL oral suspension 30 mL, 30 mL, Oral, Q6H PRN, Cony Diallo PA-C, 30 mL at 08/07/20 9103   aspirin (ECOTRIN LOW STRENGTH) EC tablet 81 mg, 81 mg, Oral, Daily, Ablerto Munoz PA-C, 81 mg at 08/08/20 0930    atorvastatin (LIPITOR) tablet 40 mg, 40 mg, Oral, QPM, Alberto Munoz PA-C, 40 mg at 08/07/20 1746    b complex-vitamin C-folic acid (NEPHROCAPS) capsule 1 capsule, 1 capsule, Oral, Daily With Dinner, Dali Chan PA-C, 1 capsule at 08/07/20 1746    cholecalciferol (VITAMIN D3) tablet 1,000 Units, 1,000 Units, Oral, Daily, Dali Chan PA-C, 1,000 Units at 08/08/20 0930    furosemide (LASIX) tablet 40 mg, 40 mg, Oral, Daily, Alberto Munoz PA-C, 40 mg at 08/08/20 0930    insulin detemir (LEVEMIR) subcutaneous injection 10 Units, 10 Units, Subcutaneous, HS, Dali Chan PA-C, 10 Units at 08/07/20 2235    insulin lispro (HumaLOG) 100 units/mL subcutaneous injection 1-6 Units, 1-6 Units, Subcutaneous, TID AC, 1 Units at 08/08/20 1214 **AND** Fingerstick Glucose (POCT), , , TID AC, Alberto Munoz PA-C    insulin lispro (HumaLOG) 100 units/mL subcutaneous injection 1-6 Units, 1-6 Units, Subcutaneous, HS, Dali Chan PA-C, 1 Units at 08/07/20 2235    insulin lispro (HumaLOG) 100 units/mL subcutaneous injection 4 Units, 4 Units, Subcutaneous, TID AC, Dali Chan PA-C, 4 Units at 08/08/20 1214    Lidocaine Viscous HCl (XYLOCAINE) 2 % mucosal solution 15 mL, 15 mL, Swish & Swallow, 4x Daily PRN, Ivan Sen PA-C, 15 mL at 08/08/20 0053    magnesium hydroxide (MILK OF MAGNESIA) 400 mg/5 mL oral suspension 30 mL, 30 mL, Oral, Daily PRN, Dali Chan PA-C    magnesium oxide (MAG-OX) tablet 400 mg, 400 mg, Oral, BID, Alberto Munoz PA-C, 400 mg at 08/08/20 0930    metoprolol tartrate (LOPRESSOR) partial tablet 12 5 mg, 12 5 mg, Oral, Q12H [facility], Dali Chan PA-C, 12 5 mg at 08/08/20 0930    NIFEdipine (PROCARDIA XL) 24 hr tablet 60 mg, 60 mg, Oral, Daily, Alberto Munoz PA-C, 60 mg at 08/08/20 0930    ondansetron (ZOFRAN) injection 4 mg, 4 mg, Intravenous, Q6H PRN, Favian Albarran PA-C    pantoprazole (PROTONIX) EC tablet 40 mg, 40 mg, Oral, Early Morning, Favian Albarran PA-C, 40 mg at 08/08/20 0554    sodium bicarbonate tablet 650 mg, 650 mg, Oral, TID AC, Favian Albarran PA-C, 650 mg at 08/08/20 1213    tamsulosin (FLOMAX) capsule 0 4 mg, 0 4 mg, Oral, Daily With Dinner, Favian Albarran PA-C, 0 4 mg at 08/07/20 1746    REVIEW OF SYSTEMS     Review of Systems   Constitutional: Negative for activity change and fatigue  HENT: Negative for congestion and ear discharge  Eyes: Negative for photophobia and pain  Respiratory: Positive for chest tightness  Negative for apnea, choking and shortness of breath  Cardiovascular: Negative for chest pain and palpitations  Gastrointestinal: Positive for abdominal pain and diarrhea  Negative for abdominal distention and blood in stool  Endocrine: Negative for heat intolerance and polyphagia  Genitourinary: Negative for flank pain and urgency  Musculoskeletal: Negative for neck pain and neck stiffness  Skin: Negative for color change and wound  Allergic/Immunologic: Negative for food allergies and immunocompromised state  Neurological: Negative for seizures and facial asymmetry  Hematological: Negative for adenopathy  Does not bruise/bleed easily  Psychiatric/Behavioral: Negative for self-injury and suicidal ideas         LAB RESULTS        Results from last 7 days   Lab Units 08/08/20  0522 08/07/20  0634 08/06/20  0444 08/06/20  0050   WBC Thousand/uL 8 93 7 91 7 76 8 74   HEMOGLOBIN g/dL 9 5* 8 5* 8 3* 8 6*   HEMATOCRIT % 29 3* 26 2* 25 2* 25 9*   PLATELETS Thousands/uL 163 139* 124* 136*   POTASSIUM mmol/L 4 4 4 2 3 9 3 5   CHLORIDE mmol/L 102 105 103 102   CO2 mmol/L 27 27 28 26   BUN mg/dL 53* 51* 47* 47*   CREATININE mg/dL 4 15* 4 00* 3 66* 3 59*   EGFR ml/min/1 73sq m 13 13 15 15   CALCIUM mg/dL 8 4 8 1* 8 4 8 3   MAGNESIUM mg/dL  --   --  1 9  --        I have personally reviewed the old medical records and patient's previously known baseline creatinine level is ~ 3 5    RADIOLOGY RESULTS     Results for orders placed during the hospital encounter of 08/06/20   XR chest 1 view portable    Narrative CHEST     INDICATION:   cp     COMPARISON:  4/22/2020    EXAM PERFORMED/VIEWS:  XR CHEST PORTABLE  1:10 AM      FINDINGS:    Cardiomediastinal silhouette appears unremarkable  The lungs are clear  No pneumothorax or pleural effusion  Osseous structures appear within normal limits for patient age  Impression No acute cardiopulmonary disease  Workstation performed: [id number]       Results for orders placed during the hospital encounter of 04/21/20   XR chest pa & lateral    Narrative CHEST     INDICATION:   questionable aspiration event  COMPARISON:  One view chest 4/21/2020    EXAM PERFORMED/VIEWS:  XR CHEST PA & LATERAL      FINDINGS:    Normal cardiac silhouette  Aortic calcification is present  Minimal residual albeit decreased left basilar subsegmental atelectasis  No pneumothorax, pulmonary edema, or gross pleural effusion  Degenerative changes bilateral shoulders  Partially visualized left nephroureteral stent  Impression Minimal residual albeit decreased left basilar subsegmental atelectasis  No other significant interval change  Workstation performed: [id number]       Results for orders placed during the hospital encounter of 01/10/20   CT chest without contrast    Narrative CT CHEST WITHOUT IV CONTRAST    INDICATION:   rule out pneumonia  COMPARISON:  April 2019    TECHNIQUE: CT examination of the chest was performed without intravenous contrast   Axial, sagittal, and coronal 2D reformatted images were created from the source data and submitted for interpretation  Radiation dose length product (DLP) for this visit:  302 8 mGy-cm     This examination, like all CT scans performed in the [facility], was performed utilizing techniques to minimize radiation dose exposure, including the use of iterative   reconstruction and automated exposure control  FINDINGS:    LUNGS:  There is no acute pulmonary infiltrate or suspicious mass lesion  Single tiny, 3 x 2 mm janneth-fissural nodule noted in the left medial apex adjacent to major fissure appearing stable  There is no tracheal or endobronchial lesion  PLEURA:  Unremarkable  HEART/GREAT VESSELS:  Ascending aortic aneurysmal dilatation of 45 mm  Minimally increased compared to prior when measured in a similar fashion, 43 mm previously  Bovine arch configuration noted  Heavy calcified coronary artery atherosclerosis  MEDIASTINUM AND NEAL:  Unremarkable  CHEST WALL AND LOWER NECK:   Mild relative elevation of the left hemidiaphragm, stable compared to January 2019  VISUALIZED STRUCTURES IN THE UPPER ABDOMEN:  Percutaneous pigtail catheter noted, through the right lobe the liver, residing in the gallbladder       OSSEOUS STRUCTURES:  No acute fracture or destructive osseous lesion  Impression Lungs appear clear  No evidence for pneumonia  Ascending aortic aneurysmal dilatation as above  Heavy calcified coronary artery disease  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  Workstation performed: [id number]       No results found for this or any previous visit  Results for orders placed during the hospital encounter of 12/10/19   CT abdomen pelvis wo contrast    Narrative CT ABDOMEN AND PELVIS WITHOUT IV CONTRAST    INDICATION:   Hepatocellular carcinoma, staging  Difficulty breathing ever since drain was put in biliary, no malignancy, rule out right upper quadrant abdominal problem      COMPARISON:  11/30/2019    TECHNIQUE:  CT examination of the abdomen and pelvis was performed without intravenous contrast   Axial, sagittal, and coronal 2D reformatted images were created from the source data and submitted for interpretation  Radiation dose length product (DLP) for this visit:  372 1 mGy-cm   This examination, like all CT scans performed in the [facility], was performed utilizing techniques to minimize radiation dose exposure, including the use of iterative   reconstruction and automated exposure control  Enteric contrast was not administered  FINDINGS:    ABDOMEN    LOWER CHEST:  No clinically significant abnormality identified in the visualized lower chest   Small nodes are seen within the epicardial fat on the right anteriorly, series 2 image 14 and 15  These appear slightly larger than the prior examination  LIVER/BILIARY TREE:  Limited evaluation of the parenchyma without contrast enhancement  Trace perihepatic ascites anteriorly  There are subtle low-density lesions scattered within the liver suggestive of hepatic metastasis  GALLBLADDER:  Previous percutaneous cholecystectomy tube underwent a recent change and there is contrast present within the mostly decompressed gallbladder  There may be a small amount of extraluminal contrast present on series 2 image 24  Slight edema   and stranding within the adjacent fat  SPLEEN:  Unremarkable  PANCREAS:  Unremarkable  ADRENAL GLANDS:  Small left adrenal nodule, series 2 image 25 is unchanged from prior examination likely adenoma  KIDNEYS/URETERS:  No hydronephrosis  Normal ureters  STOMACH AND BOWEL:  Stomach is decompressed  No small bowel dilatation  Mild colonic diverticulosis involving the sigmoid colon with no evidence of acute diverticulitis  APPENDIX:  No findings to suggest appendicitis  ABDOMINOPELVIC CAVITY:  As described above there is slight perihepatic ascites and minimal stranding within the fat adjacent to the decompressed gallbladder  Renal stranding within the right abdomen immediately below the right lobe of the liver  There   is no pneumoperitoneum      There is a calcified spiculated mesenteric mass identified within the right paramedian abdomen which is unchanged in size and configuration from the prior examination  Small mesenteric and para-aortic lymph nodes are present which appears similar in size to the prior examination  VESSELS:  Atherosclerotic changes are present  No evidence of aneurysm  PELVIS    REPRODUCTIVE ORGANS:  Mild prostatomegaly  URINARY BLADDER:  Haile catheter within a mostly decompressed bladder  ABDOMINAL WALL/INGUINAL REGIONS:  Unremarkable  OSSEOUS STRUCTURES:  Diffuse osteopenia  Mild thoracolumbar degenerative change  Impression 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  Limited evaluation of the hepatic parenchyma due to lack of contrast   Subtle low density lesions are seen consistent with hepatic metastasis  There are small mesenteric and para-aortic nodes identified which are unchanged in size and configuration  Small nodes in the right epicardial fat seen on series 2 images 13 through 15 appears slightly larger  Workstation performed: [id number]       No results found for this or any previous visit  OBJECTIVE     Current Weight: Weight - Scale: 81 2 kg (179 lb 0 2 oz)  Vitals:    08/08/20 1446   BP: 140/64   Pulse: 77   Resp: 19   Temp: 97 5 °F (36 4 °C)   SpO2: 97%       Intake/Output Summary (Last 24 hours) at 8/8/2020 1642  Last data filed at 8/8/2020 1300  Gross per 24 hour   Intake 180 ml   Output 1600 ml   Net -1420 ml       PHYSICAL EXAMINATION     Physical Exam  Constitutional:       General: He is not in acute distress  Appearance: He is well-developed  HENT:      Head: Normocephalic  Mouth/Throat:      Mouth: Mucous membranes are moist    Eyes:      General: No scleral icterus  Conjunctiva/sclera: Conjunctivae normal       Pupils: Pupils are equal, round, and reactive to light     Neck:      Musculoskeletal: Normal range of motion and neck supple  Vascular: No JVD  Cardiovascular:      Rate and Rhythm: Normal rate  Heart sounds: Murmur present  Pulmonary:      Effort: Pulmonary effort is normal       Breath sounds: No wheezing  Abdominal:      General: There is no distension  Palpations: Abdomen is soft  Tenderness: There is abdominal tenderness  Musculoskeletal: Normal range of motion  Skin:     General: Skin is warm  Findings: No rash  Neurological:      Mental Status: He is alert and oriented to person, place, and time  Psychiatric:         Behavior: Behavior normal           PLAN / RECOMMENDATIONS      CKD stage 4 to 5:  Fluctuating  Maybe slowly progressing  Patient is being monitored by nephrologist outpatient  He is not on any nephrotoxic medicine right now except diuretic which he needs  Will continue monitor closely    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: On chemotherapy and being monitor as outpatient    Chest pain:  Seems to be symptoms free right now    Abdominal discomfort:  Does not have any other symptoms of carcinoid syndrome so I will continue to monitor    Hypertension:  Reasonably well control    Will continue monitor patient with you    Thank you for the consultation to participate in patient's care  I have personally discussed my plan with the referring physician  Shaq Solo MD  Nephrology  8/8/2020        Portions of the record may have been created with voice recognition software  Occasional wrong word or "sound a like" substitutions may have occurred due to the inherent limitations of voice recognition software  Read the chart carefully and recognize, using context, where substitutions have occurred

## 2020-08-08 NOTE — ASSESSMENT & PLAN NOTE
· Anemia likely secondary to CKD and possibility to chemotherapy  · Hemoglobin stable and actually up trending    Results from last 7 days   Lab Units 08/08/20  0522 08/07/20  0634 08/06/20  0444 08/06/20  0050   HEMOGLOBIN g/dL 9 5* 8 5* 8 3* 8 6*

## 2020-08-08 NOTE — ASSESSMENT & PLAN NOTE
· Generalized abdominal pain after eating salad    Symptoms subsided after having multiple bowel movements  · KUB with question of ileus/bowel obstruction however patient has had multiple bowel movements today

## 2020-08-08 NOTE — PROGRESS NOTES
Progress Note - Megha Sarah 1940, 79 y o  male MRN: [id number]    Unit/Bed#: -01 Encounter: [id number]    Primary Care Provider: RIKA Lujan   Date and time admitted to hospital: 8/6/2020 12:32 AM        * Chest pressure  Assessment & Plan  · Left-sided chest pressure prior to admission subsided after aspirin and nitroglycerin  · Elevated D-dimer however V/Q scan low probability for pulmonary embolism and heparin infusion discontinued  · Question of dysrhythmia however no evidence of atrial fibrillation after cardiology consultation  · Question of anemia as cause however hemoglobin stable  · May be GI related as patient had recurrent symptoms after eating  salad but symptoms resolved after bowel movement    Results from last 7 days   Lab Units 08/07/20  2353 08/06/20  1206 08/06/20  0814 08/06/20  0436 08/06/20  0050   TROPONIN I ng/mL <0 02 <0 02 <0 02 <0 02 <0 02       Elevated d-dimer  Assessment & Plan  · Elevated D-dimer with chest pain  V/Q scan low probability for PE    Urinary retention  Assessment & Plan  · Chronic urinary retention on tamsulosin  · Follows with Dr Pattie Tinsley    Hypertensive kidney disease with stage 4 chronic kidney disease [facility])  Assessment & Plan  · SILVERIO on CKD 4 follows with Dr Javier Rider  · May be secondary to multiple bowel movements  · Have nephrology evaluate  Results from last 7 days   Lab Units 08/08/20  0522 08/07/20  0634 08/06/20  0444 08/06/20  0050   BUN mg/dL 53* 51* 47* 47*   CREATININE mg/dL 4 15* 4 00* 3 66* 3 59*       Gastroesophageal reflux disease without esophagitis  Assessment & Plan  · GERD continue pantoprazole    Diastolic dysfunction  Assessment & Plan  · Chronic diastolic CHF    Continue furosemide unless renal function were to continue to worsen    Normocytic anemia  Assessment & Plan  · Anemia likely secondary to CKD and possibility to chemotherapy  · Hemoglobin stable and actually up trending    Results from last 7 days   Lab Units 08/08/20  0522 08/07/20  0634 08/06/20  0444 08/06/20  0050   HEMOGLOBIN g/dL 9 5* 8 5* 8 3* 8 6*       Abdominal pain  Assessment & Plan  · Generalized abdominal pain after eating salad  Symptoms subsided after having multiple bowel movements  · KUB with question of ileus/bowel obstruction however patient has had multiple bowel movements today    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 [facility])  Assessment & Plan  · Follows with Dr Taisha Osuna as outpt  · Currently undergoing monthly chemo injections at [address] [place] [address]    Results from last 7 days   Lab Units 08/08/20  0522 08/07/20  0634 08/06/20  0444 08/06/20  0050   HEMOGLOBIN g/dL 9 5* 8 5* 8 3* 8 6*       Type 2 diabetes mellitus with hyperglycemia, with long-term current use of insulin [facility])  Assessment & Plan  Lab Results   Component Value Date    HGBA1C 7 5 (A) 06/18/2020     Recent Labs     08/07/20  2123 08/08/20  0841 08/08/20  1051 08/08/20  1553   POCGLU 166* 65 161* 231*     · Diabetes mellitus on levemir and lispro      VTE Pharmacologic Prophylaxis: Add heparin    Patient Centered Rounds: I have performed bedside rounds with nursing staff today  Discussions with Specialists or Other Care Team Provider:   Education and Discussions with Family / Patient:  Spouse    Time Spent for Care: 25 mins  More than 50% of total time spent on counseling and coordination of care as described above  Current Length of Stay: 2 day(s)  Current Patient Status: Inpatient     Certification Statement: The patient will continue to require additional inpatient hospital stay due to Chest pressure  Discharge Plan / Estimated Discharge Date:     Code Status: Level 1 - Full Code  ______________________________________________________________________________    Subjective:   Patient seen and examined    Had severe chest pain last night after eating salad but then has had multiple bowel movements    Objective:   Vitals: Blood pressure 140/64, pulse 77, temperature 97 5 °F (36 4 °C), temperature source Oral, resp  rate 19, height 5' 8" (1 727 m), weight 81 2 kg (179 lb 0 2 oz), SpO2 97 %      Physical Exam:   General appearance: alert, appears stated age and cooperative  Head: Normocephalic, without obvious abnormality, atraumatic  Lungs: diminished breath sounds  Heart: regular rate and rhythm  Abdomen: Somewhat distended but nontender positive bowel sounds  Back: negative  Extremities: edema 1+ lower extremities bilaterally  Neurologic: Grossly normal    Additional Data:   Labs:  Results from last 7 days   Lab Units 08/08/20  0522 08/07/20  0634 08/06/20  0444 08/06/20  0050   WBC Thousand/uL 8 93 7 91 7 76 8 74   HEMOGLOBIN g/dL 9 5* 8 5* 8 3* 8 6*   HEMATOCRIT % 29 3* 26 2* 25 2* 25 9*   MCV fL 94 95 93 93   PLATELETS Thousands/uL 163 139* 124* 136*     Results from last 7 days   Lab Units 08/08/20  0522 08/07/20  0634 08/06/20  0444 08/06/20  0050   SODIUM mmol/L 140 141 142 139   POTASSIUM mmol/L 4 4 4 2 3 9 3 5   CHLORIDE mmol/L 102 105 103 102   CO2 mmol/L 27 27 28 26   ANION GAP mmol/L 11 9 11 11   BUN mg/dL 53* 51* 47* 47*   CREATININE mg/dL 4 15* 4 00* 3 66* 3 59*   CALCIUM mg/dL 8 4 8 1* 8 4 8 3   ALBUMIN g/dL  --  2 5*  --  3 2*   TOTAL BILIRUBIN mg/dL  --  0 40  --  0 50   ALK PHOS U/L  --  116  --  126*   ALT U/L  --  23  --  24   AST U/L  --  14  --  16   EGFR ml/min/1 73sq m 13 13 15 15   GLUCOSE RANDOM mg/dL 98 63* 133 192*     Results from last 7 days   Lab Units 08/06/20  0444   MAGNESIUM mg/dL 1 9     Results from last 7 days   Lab Units 08/07/20  2353 08/06/20  1206 08/06/20  0814   TROPONIN I ng/mL <0 02 <0 02 <0 02              Results from last 7 days   Lab Units 08/08/20  1553 08/08/20  1051 08/08/20  0841 08/07/20  2123 08/07/20  1621 08/07/20  1059 08/07/20  1004 08/07/20  0749 08/06/20  2026 08/06/20  1557 08/06/20  1129 08/06/20  0738   POC GLUCOSE mg/dl 231* 161* 65 166* 227* 275* 250* 54* 279* 226* 201* 110             * I Have Reviewed All Lab Data Listed Above     Cultures:               Imaging:  Imaging Reports Reviewed Today Include:   Xr Chest 1 View Portable    Result Date: 8/6/2020  Impression: No acute cardiopulmonary disease  Workstation performed: [id number]     Xr Abdomen 1 View Kub    Result Date: 8/8/2020  Impression: Air within loops of large and small bowel  Number of the small bowel loops are moderately dilated  Although the findings may represent an ileus, small bowel obstruction cannot be excluded  Recommend CT of the abdomen and pelvis  The study was marked in [facility] for immediate notification  Workstation performed: [id number]     N[facility] Lung Perfusion Imaging    Result Date: 8/6/2020  Impression: The probability for pulmonary embolus is low   Workstation performed: [id number]     Scheduled Meds:  Current Facility-Administered Medications   Medication Dose Route Frequency Provider Last Rate    acetaminophen  975 mg Oral Q6H PRN Ann Gooden PA-C      aluminum-magnesium hydroxide-simethicone  30 mL Oral Q6H PRN Ann Gooden PA-C      aspirin  81 mg Oral Daily Ann Gooden [place]      atorvastatin  40 mg Oral QPM Ann Gooden PA-C      b complex-vitamin C-folic acid  1 capsule Oral Daily With ROMANA [address] [place], [place]      cholecalciferol  1,000 Units Oral Daily Ann [place], [place]      furosemide  40 mg Oral Daily Ann [place], [place]      insulin detemir  10 Units Subcutaneous HS [facility] [place]      insulin lispro  1-6 Units Subcutaneous TID [facility] Ann Gooden PA-C      insulin lispro  1-6 Units Subcutaneous HS Ann Gooden PA-C      insulin lispro  4 Units Subcutaneous TID AC Alberto Munoz PA-C      Lidocaine Viscous HCl  15 mL Swish & Swallow 4x Daily PRN [place] [facility]      magnesium hydroxide  30 mL Oral Daily PRN Ann Gooden PA-C      magnesium oxide  400 mg Oral BID Ann Gooden PA-C      metoprolol tartrate  12 5 mg Oral Q12H [address] Ann Gooden PA-C      NIFEdipine ER  60 mg Oral Daily Jeannie Aden [place]      ondansetron  4 mg Intravenous Q6H PRN Jeannie Aden PA-C      pantoprazole  40 mg Oral Early Morning Jericho Ryan      sodium bicarbonate  650 mg Oral TID [facility] Jericho Ryan      tamsulosin  0 4 mg Oral Daily With [address] [facility], DO Tanvi Conroy [facility] Internal Medicine  Hospitalist    ** Please Note: This note has been constructed using a voice recognition system   **

## 2020-08-08 NOTE — ASSESSMENT & PLAN NOTE
Lab Results   Component Value Date    HGBA1C 7 5 (A) 06/18/2020     Recent Labs     08/07/20  2123 08/08/20  0841 08/08/20  1051 08/08/20  1553   POCGLU 166* 65 161* 231*     · Diabetes mellitus on levemir and lispro

## 2020-08-08 NOTE — ASSESSMENT & PLAN NOTE
· Left-sided chest pressure prior to admission subsided after aspirin and nitroglycerin  · Elevated D-dimer however V/Q scan low probability for pulmonary embolism and heparin infusion discontinued  · Question of dysrhythmia however no evidence of atrial fibrillation after cardiology consultation  · Question of anemia as cause however hemoglobin stable  · May be GI related as patient had recurrent symptoms after eating  salad but symptoms resolved after bowel movement    Results from last 7 days   Lab Units 08/07/20  2353 08/06/20  1206 08/06/20  0814 08/06/20  0436 08/06/20  0050   TROPONIN I ng/mL <0 02 <0 02 <0 02 <0 02 <0 02

## 2020-08-08 NOTE — NURSING NOTE
Pt has been complaining of severe epigastric pain after eating diner, pain of 9 out 10, vitals /67, Temp 97 5 pulse 80, RR 19,  got simethicone, and scheduled metoprolol 12 5 mg, didn't help  Slim notified, 12 lead EKG done followed with troponin,   At 0050, pt called still moaning of epigastric pain, gave lidocaine viscous Hcl,  3 mins later pt had large bowl movement, janel colored stool with foul smelling, After the bowl movent pt felt much better and went back to sleep with no further issues

## 2020-08-08 NOTE — ASSESSMENT & PLAN NOTE
· Follows with Dr Hughes as outpt  · Currently undergoing monthly chemo injections at [facility]    Results from last 7 days   Lab Units 08/08/20  0522 08/07/20  0634 08/06/20  0444 08/06/20  0050   HEMOGLOBIN g/dL 9 5* 8 5* 8 3* 8 6*

## 2020-08-08 NOTE — ASSESSMENT & PLAN NOTE
· SILVERIO on CKD 4 follows with Dr Bell Galeano  · May be secondary to multiple bowel movements  · Have nephrology evaluate      Results from last 7 days   Lab Units 08/08/20  0522 08/07/20  0634 08/06/20  0444 08/06/20  0050   BUN mg/dL 53* 51* 47* 47*   CREATININE mg/dL 4 15* 4 00* 3 66* 3 59*

## 2020-08-09 NOTE — ASSESSMENT & PLAN NOTE
· SILVERIO on CKD 4 follows with Dr Dior Brooke  · [address] nephrology evaluation    No changes made at this time    Results from last 7 days   Lab Units 08/09/20  0551 08/08/20  0522 08/07/20  0634 08/06/20  0444 08/06/20  0050   BUN mg/dL 56* 53* 51* 47* 47*   CREATININE mg/dL 4 20* 4 15* 4 00* 3 66* 3 59*

## 2020-08-09 NOTE — ASSESSMENT & PLAN NOTE
· Anemia likely secondary to CKD and possibility to chemotherapy  · Hemoglobin stable at this time    Results from last 7 days   Lab Units 08/09/20  0551 08/08/20  0522 08/07/20  0634 08/06/20  0444 08/06/20  0050   HEMOGLOBIN g/dL 8 7* 9 5* 8 5* 8 3* 8 6*

## 2020-08-09 NOTE — PROGRESS NOTES
NEPHROLOGY PROGRESS NOTE    Patient: Hallie Tyler               Sex: male          DOA: 8/6/2020 12:32 AM   YOB: 1940        Age:  79 y o         LOS:  LOS: 3 days       HPI     Patient with stage IV CKD admitted the hospital with chest pain    SUBJECTIVE     Patient does not uro endocrine tumor with metastatic to the liver  Also has a carcinoid syndrome    Patient is complaining abdominal discomfort which is still bothering him  He claims it is better than yesterday but still bothering him    No chest pain no palpitation now    No diaphoresis    CURRENT MEDICATIONS       Current Facility-Administered Medications:     acetaminophen (TYLENOL) tablet 975 mg, 975 mg, Oral, Q6H PRN, Melvin Pearson PA-C    aluminum-magnesium hydroxide-simethicone (MYLANTA) 200-200-20 mg/5 mL oral suspension 30 mL, 30 mL, Oral, Q6H PRN, Melvin Pearson PA-C, 30 mL at 08/07/20 2106    aspirin (ECOTRIN LOW STRENGTH) EC tablet 81 mg, 81 mg, Oral, Daily, Alberto Munoz PA-C, 81 mg at 08/09/20 0841    atorvastatin (LIPITOR) tablet 40 mg, 40 mg, Oral, QPM, Alberto Munoz PA-C, 40 mg at 08/08/20 1708    b complex-vitamin C-folic acid (NEPHROCAPS) capsule 1 capsule, 1 capsule, Oral, Daily With Dinner, Melvin Pearson PA-C, 1 capsule at 08/08/20 1708    cholecalciferol (VITAMIN D3) tablet 1,000 Units, 1,000 Units, Oral, Daily, Melvin Pearson PA-C, 1,000 Units at 08/09/20 0841    furosemide (LASIX) tablet 40 mg, 40 mg, Oral, Daily, Alberto Munoz PA-C, 40 mg at 08/09/20 0841    heparin (porcine) subcutaneous injection 5,000 Units, 5,000 Units, Subcutaneous, Q8H [address], Ady Suggs DO, 5,000 Units at 08/09/20 0522    insulin detemir (LEVEMIR) subcutaneous injection 5 Units, 5 Units, Subcutaneous, HS, Ady Suggs DO    insulin lispro (HumaLOG) 100 units/mL subcutaneous injection 1-6 Units, 1-6 Units, Subcutaneous, TID AC, 3 Units at 08/08/20 1710 **AND** Fingerstick Glucose (POCT), , , RAEANN LEE, Susan Sutton Juany Brooks PA-C    insulin lispro (HumaLOG) 100 units/mL subcutaneous injection 1-6 Units, 1-6 Units, Subcutaneous, HS, Adam Hernandes PA-C, 3 Units at 08/08/20 2135    insulin lispro (HumaLOG) 100 units/mL subcutaneous injection 4 Units, 4 Units, Subcutaneous, TID AC, Adam Hernandes PA-C, 4 Units at 08/08/20 1709    Lidocaine Viscous HCl (XYLOCAINE) 2 % mucosal solution 15 mL, 15 mL, Swish & Swallow, 4x Daily PRN, Aristeo Lewis PA-C, 15 mL at 08/08/20 0053    magnesium hydroxide (MILK OF MAGNESIA) 400 mg/5 mL oral suspension 30 mL, 30 mL, Oral, Daily PRN, Adam Hernandes PA-C    magnesium oxide (MAG-OX) tablet 400 mg, 400 mg, Oral, BID, Alberto Munoz PA-C, 400 mg at 08/09/20 0841    metoprolol tartrate (LOPRESSOR) partial tablet 12 5 mg, 12 5 mg, Oral, Q12H [address], Adam Hernandes PA-C, 12 5 mg at 08/09/20 0843    NIFEdipine (PROCARDIA XL) 24 hr tablet 60 mg, 60 mg, Oral, Daily, Alberto Munoz PA-C, 60 mg at 08/09/20 0843    ondansetron (ZOFRAN) injection 4 mg, 4 mg, Intravenous, Q6H PRN, Alberto Munoz PA-C    pantoprazole (PROTONIX) EC tablet 40 mg, 40 mg, Oral, Early Morning, Alberto Munoz PA-C, 40 mg at 08/09/20 9912    sodium bicarbonate tablet 650 mg, 650 mg, Oral, TID AC, Adam Hernandes PA-C, 650 mg at 08/09/20 0841    tamsulosin (FLOMAX) capsule 0 4 mg, 0 4 mg, Oral, Daily With Dinner, Adam Hernandes PA-C, 0 4 mg at 08/08/20 1708    OBJECTIVE     Current Weight: Weight - Scale: 81 2 kg (179 lb 0 2 oz)  Vitals:    08/09/20 0727   BP: 154/68   Pulse: 70   Resp: 19   Temp: 98 1 °F (36 7 °C)   SpO2: 95%       Intake/Output Summary (Last 24 hours) at 8/9/2020 1132  Last data filed at 8/9/2020 1100  Gross per 24 hour   Intake 600 ml   Output 2900 ml   Net -2300 ml       PHYSICAL EXAMINATION     Physical Exam  Constitutional:       General: He is not in acute distress  Appearance: He is well-developed  HENT:      Head: Normocephalic  Eyes:      General: No scleral icterus  Conjunctiva/sclera: Conjunctivae normal    Neck:      Musculoskeletal: Neck supple  Vascular: No JVD  Cardiovascular:      Rate and Rhythm: Normal rate  Heart sounds: Normal heart sounds  Pulmonary:      Effort: Pulmonary effort is normal       Breath sounds: No wheezing  Abdominal:      Palpations: Abdomen is soft  Tenderness: There is no abdominal tenderness  Musculoskeletal: Normal range of motion  Skin:     General: Skin is warm  Findings: No rash  Neurological:      Mental Status: He is alert and oriented to person, place, and time  Psychiatric:         Behavior: Behavior normal           LAB RESULTS     Results from last 7 days   Lab Units 08/09/20  0551 08/08/20  0522 08/07/20  0634 08/06/20  0444 08/06/20  0050   WBC Thousand/uL 7 72 8 93 7 91 7 76 8 74   HEMOGLOBIN g/dL 8 7* 9 5* 8 5* 8 3* 8 6*   HEMATOCRIT % 26 8* 29 3* 26 2* 25 2* 25 9*   PLATELETS Thousands/uL 137* 163 139* 124* 136*   POTASSIUM mmol/L 4 1 4 4 4 2 3 9 3 5   CHLORIDE mmol/L 105 102 105 103 102   CO2 mmol/L 30 27 27 28 26   BUN mg/dL 56* 53* 51* 47* 47*   CREATININE mg/dL 4 20* 4 15* 4 00* 3 66* 3 59*   EGFR ml/min/1 73sq m 12 13 13 15 15   CALCIUM mg/dL 8 6 8 4 8 1* 8 4 8 3   MAGNESIUM mg/dL  --   --   --  1 9  --        RADIOLOGY RESULTS      Results for orders placed during the hospital encounter of 08/06/20   XR chest 1 view portable    Narrative CHEST     INDICATION:   cp     COMPARISON:  4/22/2020    EXAM PERFORMED/VIEWS:  XR CHEST PORTABLE  1:10 AM      FINDINGS:    Cardiomediastinal silhouette appears unremarkable  The lungs are clear  No pneumothorax or pleural effusion  Osseous structures appear within normal limits for patient age  Impression No acute cardiopulmonary disease          Workstation performed: [id number]       Results for orders placed during the hospital encounter of 04/21/20   XR chest pa & lateral    Narrative CHEST     INDICATION:   questionable aspiration event     COMPARISON:  One view chest 4/21/2020    EXAM PERFORMED/VIEWS:  XR CHEST PA & LATERAL      FINDINGS:    Normal cardiac silhouette  Aortic calcification is present  Minimal residual albeit decreased left basilar subsegmental atelectasis  No pneumothorax, pulmonary edema, or gross pleural effusion  Degenerative changes bilateral shoulders  Partially visualized left nephroureteral stent  Impression Minimal residual albeit decreased left basilar subsegmental atelectasis  No other significant interval change  Workstation performed: [id number]       Results for orders placed during the hospital encounter of 01/10/20   CT chest without contrast    Narrative CT CHEST WITHOUT IV CONTRAST    INDICATION:   rule out pneumonia  COMPARISON:  April 2019    TECHNIQUE: CT examination of the chest was performed without intravenous contrast   Axial, sagittal, and coronal 2D reformatted images were created from the source data and submitted for interpretation  Radiation dose length product (DLP) for this visit:  302 8 mGy-cm   This examination, like all CT scans performed in the [facility], was performed utilizing techniques to minimize radiation dose exposure, including the use of iterative   reconstruction and automated exposure control  FINDINGS:    LUNGS:  There is no acute pulmonary infiltrate or suspicious mass lesion  Single tiny, 3 x 2 mm janneth-fissural nodule noted in the left medial apex adjacent to major fissure appearing stable  There is no tracheal or endobronchial lesion  PLEURA:  Unremarkable  HEART/GREAT VESSELS:  Ascending aortic aneurysmal dilatation of 45 mm  Minimally increased compared to prior when measured in a similar fashion, 43 mm previously  Bovine arch configuration noted  Heavy calcified coronary artery atherosclerosis  MEDIASTINUM AND NEAL:  Unremarkable      CHEST WALL AND LOWER NECK:   Mild relative elevation of the left hemidiaphragm, stable compared to January 2019  VISUALIZED STRUCTURES IN THE UPPER ABDOMEN:  Percutaneous pigtail catheter noted, through the right lobe the liver, residing in the gallbladder       OSSEOUS STRUCTURES:  No acute fracture or destructive osseous lesion  Impression Lungs appear clear  No evidence for pneumonia  Ascending aortic aneurysmal dilatation as above  Heavy calcified coronary artery disease  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  Workstation performed: [id number]       No results found for this or any previous visit  Results for orders placed during the hospital encounter of 12/10/19   CT abdomen pelvis wo contrast    Narrative CT ABDOMEN AND PELVIS WITHOUT IV CONTRAST    INDICATION:   Hepatocellular carcinoma, staging  Difficulty breathing ever since drain was put in biliary, no malignancy, rule out right upper quadrant abdominal problem  COMPARISON:  11/30/2019    TECHNIQUE:  CT examination of the abdomen and pelvis was performed without intravenous contrast   Axial, sagittal, and coronal 2D reformatted images were created from the source data and submitted for interpretation  Radiation dose length product (DLP) for this visit:  372 1 mGy-cm   This examination, like all CT scans performed in the [facility], was performed utilizing techniques to minimize radiation dose exposure, including the use of iterative   reconstruction and automated exposure control  Enteric contrast was not administered  FINDINGS:    ABDOMEN    LOWER CHEST:  No clinically significant abnormality identified in the visualized lower chest   Small nodes are seen within the epicardial fat on the right anteriorly, series 2 image 14 and 15  These appear slightly larger than the prior examination  LIVER/BILIARY TREE:  Limited evaluation of the parenchyma without contrast enhancement  Trace perihepatic ascites anteriorly      There are subtle low-density lesions scattered within the liver suggestive of hepatic metastasis  GALLBLADDER:  Previous percutaneous cholecystectomy tube underwent a recent change and there is contrast present within the mostly decompressed gallbladder  There may be a small amount of extraluminal contrast present on series 2 image 24  Slight edema   and stranding within the adjacent fat  SPLEEN:  Unremarkable  PANCREAS:  Unremarkable  ADRENAL GLANDS:  Small left adrenal nodule, series 2 image 25 is unchanged from prior examination likely adenoma  KIDNEYS/URETERS:  No hydronephrosis  Normal ureters  STOMACH AND BOWEL:  Stomach is decompressed  No small bowel dilatation  Mild colonic diverticulosis involving the sigmoid colon with no evidence of acute diverticulitis  APPENDIX:  No findings to suggest appendicitis  ABDOMINOPELVIC CAVITY:  As described above there is slight perihepatic ascites and minimal stranding within the fat adjacent to the decompressed gallbladder  Renal stranding within the right abdomen immediately below the right lobe of the liver  There   is no pneumoperitoneum  There is a calcified spiculated mesenteric mass identified within the right paramedian abdomen which is unchanged in size and configuration from the prior examination  Small mesenteric and para-aortic lymph nodes are present which appears similar in size to the prior examination  VESSELS:  Atherosclerotic changes are present  No evidence of aneurysm  PELVIS    REPRODUCTIVE ORGANS:  Mild prostatomegaly  URINARY BLADDER:  Haile catheter within a mostly decompressed bladder  ABDOMINAL WALL/INGUINAL REGIONS:  Unremarkable  OSSEOUS STRUCTURES:  Diffuse osteopenia  Mild thoracolumbar degenerative change  Impression 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      Limited evaluation of the hepatic parenchyma due to lack of contrast   Subtle low density lesions are seen consistent with hepatic metastasis  There are small mesenteric and para-aortic nodes identified which are unchanged in size and configuration  Small nodes in the right epicardial fat seen on series 2 images 13 through 15 appears slightly larger  Workstation performed: [id number]       No results found for this or any previous visit  PLAN / RECOMMENDATIONS      CKD stage 4 to 5:  Slowly progressing  Will need to go on dialysis soon  He is being monitored as outpatient by nephrologist will advised to go and follow up with him  He does not need any acute dialysis now    Abdominal discomfort:  Still bothering him possible related to GERD    Hypertension:  Quite well control    Will continue to monitor    Mckayla Bravo MD  Nephrology  8/9/2020        Portions of the record may have been created with voice recognition software  Occasional wrong word or "sound a like" substitutions may have occurred due to the inherent limitations of voice recognition software  Read the chart carefully and recognize, using context, where substitutions have occurred

## 2020-08-09 NOTE — ASSESSMENT & PLAN NOTE
· Left-sided chest pressure prior to admission subsided after aspirin and nitroglycerin  · Elevated D-dimer however V/Q scan low probability for pulmonary embolism and heparin infusion discontinued  · Question of dysrhythmia however no evidence of atrial fibrillation after cardiology consultation  · Question of anemia as cause however hemoglobin stable  · May be GI related as patient had recurrent symptoms after eating  salad but symptoms resolved after bowel movement  · Given ongoing abdominal pain will check CT abdomen pelvis with oral contrast    Results from last 7 days   Lab Units 08/07/20  2353 08/06/20  1206 08/06/20  0814 08/06/20  0436 08/06/20  0050   TROPONIN I ng/mL <0 02 <0 02 <0 02 <0 02 <0 02

## 2020-08-09 NOTE — ASSESSMENT & PLAN NOTE
· Follows with Dr Shailesh Vaughan as outpatient  · Currently undergoing monthly chemo injections at [facility]    Results from last 7 days   Lab Units 08/09/20  0551 08/08/20  0522 08/07/20  0634 08/06/20  0444 08/06/20  0050   HEMOGLOBIN g/dL 8 7* 9 5* 8 5* 8 3* 8 6*

## 2020-08-09 NOTE — ASSESSMENT & PLAN NOTE
· Generalized abdominal pain after eating salad    Symptoms subsided after having multiple bowel movements  · KUB with question of ileus/bowel obstruction however patient continues to have multiple bowel movements   · Given ongoing abdominal pain, check CT abdomen pelvis

## 2020-08-09 NOTE — PROGRESS NOTES
Progress Note - Perri Miguel 1940, 79 y o  male MRN: [id number]    Unit/Bed#: -01 Encounter: [id number]    Primary Care Provider: RIKA Lamb   Date and time admitted to hospital: 8/6/2020 12:32 AM        * Chest pressure  Assessment & Plan  · Left-sided chest pressure prior to admission subsided after aspirin and nitroglycerin  · Elevated D-dimer however V/Q scan low probability for pulmonary embolism and heparin infusion discontinued  · Question of dysrhythmia however no evidence of atrial fibrillation after cardiology consultation  · Question of anemia as cause however hemoglobin stable  · May be GI related as patient had recurrent symptoms after eating  salad but symptoms resolved after bowel movement  · Given ongoing abdominal pain will check CT abdomen pelvis with oral contrast    Results from last 7 days   Lab Units 08/07/20  2353 08/06/20  1206 08/06/20  0814 08/06/20  0436 08/06/20  0050   TROPONIN I ng/mL <0 02 <0 02 <0 02 <0 02 <0 02       Elevated d-dimer  Assessment & Plan  · Elevated D-dimer with chest pain  V/Q scan low probability for PE    Urinary retention  Assessment & Plan  · Chronic urinary retention on tamsulosin  · Follows with Dr Ginna Martínez    Hypertensive kidney disease with stage 4 chronic kidney disease ([facility] [address] )  Assessment & Plan  · SILVERIO on CKD 4 follows with Dr Jewelene Buerger  · [address] nephrology evaluation  No changes made at this time    Results from last 7 days   Lab Units 08/09/20  0551 08/08/20  0522 08/07/20  0634 08/06/20  0444 08/06/20  0050   BUN mg/dL 56* 53* 51* 47* 47*   CREATININE mg/dL 4 20* 4 15* 4 00* 3 66* 3 59*       Gastroesophageal reflux disease without esophagitis  Assessment & Plan  · GERD continue pantoprazole    Diastolic dysfunction  Assessment & Plan  · Chronic diastolic CHF    Continue furosemide unless renal function were to continue to worsen    Normocytic anemia  Assessment & Plan  · Anemia likely secondary to CKD and possibility to chemotherapy  · Hemoglobin stable at this time    Results from last 7 days   Lab Units 08/09/20  0551 08/08/20  0522 08/07/20  0634 08/06/20  0444 08/06/20  0050   HEMOGLOBIN g/dL 8 7* 9 5* 8 5* 8 3* 8 6*       Abdominal pain  Assessment & Plan  · Generalized abdominal pain after eating salad  Symptoms subsided after having multiple bowel movements  · KUB with question of ileus/bowel obstruction however patient continues to have multiple bowel movements   · Given ongoing abdominal pain, check CT abdomen pelvis    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 [facility])  Assessment & Plan  · Follows with Dr Viktor Johnson as outpatient  · Currently undergoing monthly chemo injections at [facility]    Results from last 7 days   Lab Units 08/09/20  0551 08/08/20  0522 08/07/20  0634 08/06/20  0444 08/06/20  0050   HEMOGLOBIN g/dL 8 7* 9 5* 8 5* 8 3* 8 6*       Essential hypertension  Assessment & Plan  · Essential hypertension continue nifedipine and metoprolol    Type 2 diabetes mellitus with hyperglycemia, with long-term current use of insulin [facility])  Assessment & Plan  Lab Results   Component Value Date    HGBA1C 7 5 (A) 06/18/2020     Recent Labs     08/08/20  2045 08/09/20  0730 08/09/20  0904 08/09/20  1048   POCGLU 234* 47* 200* 286*     · Diabetes mellitus with morning hypoglycemia on levemir and lispro  Patient reports only on 5 units of levemir q h s , will adjust      VTE Pharmacologic Prophylaxis: Heparin    Patient Centered Rounds: I have performed bedside rounds with nursing staff today  Discussions with Specialists or Other Care Team Provider:  Nephrology  Education and Discussions with Family / Patient:  Spouse    Time Spent for Care: 25 mins  More than 50% of total time spent on counseling and coordination of care as described above      Current Length of Stay: 3 day(s)  Current Patient Status: Inpatient     Certification Statement: The patient will continue to require additional inpatient hospital stay due to Chest pressure  Discharge Plan / Estimated Discharge Date:  Hopefully next 24 hours    Code Status: Level 1 - Full Code  ______________________________________________________________________________    Subjective:   Patient seen and examined  Still having abdominal discomfort  Continues to have multiple bowel movements daily  No further chest pain  Was noted to have a m  hypoglycemia again    Objective:   Vitals: Blood pressure 154/68, pulse 70, temperature 98 1 °F (36 7 °C), temperature source Oral, resp  rate 19, height 5' 8" (1 727 m), weight 81 2 kg (179 lb 0 2 oz), SpO2 95 %      Physical Exam:   General appearance: alert, appears stated age and cooperative  Head: Normocephalic, without obvious abnormality, atraumatic  Lungs: diminished breath sounds  Heart: regular rate and rhythm  Abdomen: Distended nontender decreased bowel sounds  Back: negative  Extremities: edema 1+ lower extremities bilaterally  Neurologic: Grossly normal    Additional Data:   Labs:  Results from last 7 days   Lab Units 08/09/20  0551 08/08/20  0522 08/07/20  0634 08/06/20  0444 08/06/20  0050   WBC Thousand/uL 7 72 8 93 7 91 7 76 8 74   HEMOGLOBIN g/dL 8 7* 9 5* 8 5* 8 3* 8 6*   HEMATOCRIT % 26 8* 29 3* 26 2* 25 2* 25 9*   MCV fL 95 94 95 93 93   PLATELETS Thousands/uL 137* 163 139* 124* 136*     Results from last 7 days   Lab Units 08/09/20  0551 08/08/20  0522 08/07/20  0634 08/06/20  0444 08/06/20  0050   SODIUM mmol/L 142 140 141 142 139   POTASSIUM mmol/L 4 1 4 4 4 2 3 9 3 5   CHLORIDE mmol/L 105 102 105 103 102   CO2 mmol/L 30 27 27 28 26   ANION GAP mmol/L 7 11 9 11 11   BUN mg/dL 56* 53* 51* 47* 47*   CREATININE mg/dL 4 20* 4 15* 4 00* 3 66* 3 59*   CALCIUM mg/dL 8 6 8 4 8 1* 8 4 8 3   ALBUMIN g/dL  --   --  2 5*  --  3 2*   TOTAL BILIRUBIN mg/dL  --   --  0 40  --  0 50   ALK PHOS U/L  --   --  116  --  126*   ALT U/L  --   --  23  --  24   AST U/L  --   --  14  --  16   EGFR ml/min/1 73sq m 12 13 13 15 15   GLUCOSE RANDOM mg/dL 51* 98 63* 133 192*     Results from last 7 days   Lab Units 08/06/20  0444   MAGNESIUM mg/dL 1 9     Results from last 7 days   Lab Units 08/07/20  2353 08/06/20  1206 08/06/20  0814   TROPONIN I ng/mL <0 02 <0 02 <0 02              Results from last 7 days   Lab Units 08/09/20  1048 08/09/20  0904 08/09/20  0730 08/08/20  2045 08/08/20  1553 08/08/20  1051 08/08/20  0841 08/07/20  2123 08/07/20  1621 08/07/20  1059 08/07/20  1004 08/07/20  0749   POC GLUCOSE mg/dl 286* 200* 47* 234* 231* 161* 65 166* 227* 275* 250* 54*             * I Have Reviewed All Lab Data Listed Above  Cultures:               Imaging:  Imaging Reports Reviewed Today Include:   Xr Chest 1 View Portable    Result Date: 8/6/2020  Impression: No acute cardiopulmonary disease  Workstation performed: [id number]     Xr Abdomen 1 View Kub    Result Date: 8/8/2020  Impression: Air within loops of large and small bowel  Number of the small bowel loops are moderately dilated  Although the findings may represent an ileus, small bowel obstruction cannot be excluded  Recommend CT of the abdomen and pelvis  The study was marked in [facility] for immediate notification  Workstation performed: [id number]     [facility]    Result Date: 8/6/2020  Impression: The probability for pulmonary embolus is low   Workstation performed: [id number]     Scheduled Meds:  Current Facility-Administered Medications   Medication Dose Route Frequency Provider Last Rate    acetaminophen  975 mg Oral Q6H PRN Myrl Natalia, PA-C      aluminum-magnesium hydroxide-simethicone  30 mL Oral Q6H PRN Myrericka Fisher, PA-C      aspirin  81 mg Oral Daily [place], [place]      atorvastatin  40 mg Oral QPM RL Segal-FOUZIA      b complex-vitamin C-folic acid  1 capsule Oral Daily With [address], [place]      cholecalciferol  1,000 Units Oral Daily [place], [place]      furosemide  40 mg Oral Daily RL Segal-C      heparin (porcine) 5,000 Units Subcutaneous [facility] Brandy jackson DO      insulin detemir  10 Units Subcutaneous HS Da Quayesha, ROBBY      insulin lispro  1-6 Units Subcutaneous TID [facility] Da Quivers, ROBBY      insulin lispro  1-6 Units Subcutaneous HS Da Quivers, ROBBY      insulin lispro  4 Units Subcutaneous TID AC Da ROBBY Snyder      Lidocaine Viscous HCl  15 mL Swish & Swallow 4x Daily PRN Maricel Falling, ROBBY      magnesium hydroxide  30 mL Oral Daily PRN Da Quivers, ROBBY      magnesium oxide  400 mg Oral BID Da Quivers, ROBBY      metoprolol tartrate  12 5 mg Oral Q12H [address] Da Quayesha, ROBBY      NIFEdipine ER  60 mg Oral Daily Da QuiversNoni      ondansetron  4 mg Intravenous Q6H PRN Da QuiversROBBY      pantoprazole  40 mg Oral Early Morning Da QuiversNoni      sodium bicarbonate  650 mg Oral TID AC Da QuiversROBBY      tamsulosin  0 4 mg Oral Daily With Dinner Da Quayesha, ROBBY [email or web address]  [facility] Internal Medicine  Hospitalist    ** Please Note: This note has been constructed using a voice recognition system   **

## 2020-08-09 NOTE — ASSESSMENT & PLAN NOTE
Lab Results   Component Value Date    HGBA1C 7 5 (A) 06/18/2020     Recent Labs     08/08/20  2045 08/09/20  0730 08/09/20  0904 08/09/20  1048   POCGLU 234* 47* 200* 286*     · Diabetes mellitus with morning hypoglycemia on levemir and lispro    Patient reports only on 5 units of levemir q h s , will adjust

## 2020-08-10 PROBLEM — N17.9 ACUTE KIDNEY FAILURE (HCC): Status: ACTIVE | Noted: 2020-01-01

## 2020-08-10 NOTE — PROGRESS NOTES
[facility] Internal Medicine      Progress Note - Maria Luisa Singh 1940, 79 y o  male MRN: [id number]    Unit/Bed#: -01 Encounter: [id number]    Primary Care Provider: RIKA Yañez   Date and time admitted to hospital: 8/6/2020 12:32 AM        * Chest pressure  Assessment & Plan  · Left-sided chest pressure prior to admission subsided after aspirin and nitroglycerin, Likely 2/2 to GERD as evidenced by negative cardiac work up and epigastric complaints worsening after meals   · Elevated D-dimer however V/Q scan low probability for pulmonary embolism and heparin infusion discontinued  · May be GI related as patient had recurrent symptoms after eating  salad but symptoms resolved after bowel movement  · Given ongoing abdominal pain check CT abdomen pelvis with oral contrast      Results from last 7 days   Lab Units 08/07/20  2353 08/06/20  1206 08/06/20  0814 08/06/20  0436 08/06/20  0050   TROPONIN I ng/mL <0 02 <0 02 <0 02 <0 02 <0 02       Abdominal pain  Assessment & Plan  · Generalized abdominal pain after eating salad  Symptoms subsided after having multiple bowel movements  · check CT abdomen pelvis-   Bowel wall thickening of loops of small bowel in the lower central and right lower quadrant of the abdomen as well as mild bowel wall thickening of the ascending colon  Findings are suspicious for enterocolitis  There is no evidence of bowel obstruction  There is a small amount of ascites throughout the abdomen and pelvis  Similar appearance of the calcified central mesenteric mass at oh (carcinoid) and adjacent prominent lymph nodes  Stable heterogeneous hypodense masses throughout the liver similar to prior exams in keeping with history of                      metastases  Small bilateral pleural effusions and adjacent compressive atelectasis  Reflux of contrast into the distal             esophagus  Correlate clinically for reflux esophagitis    · GI consulted    Gastroesophageal reflux disease without esophagitis  Assessment & Plan  · GERD continue pantoprazole  · Reflux seen on CT       Normocytic anemia  Assessment & Plan  · Anemia likely secondary to CKD and possibility to chemotherapy  · Hemoglobin stable at this time    Results from last 7 days   Lab Units 08/10/20  0442 08/09/20  0551 08/08/20  0522 08/07/20  0634 08/06/20  0444 08/06/20  0050   HEMOGLOBIN g/dL 8 6* 8 7* 9 5* 8 5* 8 3* 8 6*       Acute kidney failure (HCC)  Assessment & Plan  · CKD stage 4 to 5 fluctuating  Baseline serum creatinine between 3 5- 4   · Currently 4 06     Elevated d-dimer  Assessment & Plan  · Elevated D-dimer with chest pain  V/Q scan low probability for PE    Urinary retention  Assessment & Plan  · Chronic urinary retention on tamsulosin  · Follows with Dr Gerard Easley  · 1+ edema bilateral feet  · Continue home dose Lasix 40mg (takes PRN for edema)    Hypertensive kidney disease with stage 4 chronic kidney disease ([facility] [address] )  Assessment & Plan  · SILVERIO on CKD 4 follows with Dr Tammy Vick  · Nephrology following and signed off today     Results from last 7 days   Lab Units 08/10/20  0442 08/09/20  0551 08/08/20  0522 08/07/20  0634 08/06/20  0444 08/06/20  0050   BUN mg/dL 55* 56* 53* 51* 47* 47*   CREATININE mg/dL 4 06* 4 20* 4 15* 4 00* 3 66* 7 17*       Diastolic dysfunction  Assessment & Plan  · Chronic diastolic CHF    Continue furosemide unless renal function were to continue to worsen  · Currently stable    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 [facility])  Assessment & Plan  · Follows with Dr Taisha Osuna as outpatient  · Currently undergoing monthly chemo injections at [facility]    Results from last 7 days   Lab Units 08/10/20  0442 08/09/20  0551 08/08/20  0522 08/07/20  0634 08/06/20  0444 08/06/20  0050   HEMOGLOBIN g/dL 8 6* 8 7* 9 5* 8 5* 8 3* 8 6*       Essential hypertension  Assessment & Plan  · Essential hypertension continue nifedipine and metoprolol    Type 2 diabetes mellitus with hyperglycemia, with long-term current use of insulin [facility])  Assessment & Plan  Lab Results   Component Value Date    HGBA1C 7 5 (A) 2020     Recent Labs     20  2104 08/10/20  0751 08/10/20  1140 08/10/20  1612   POCGLU 231* 54* 211* 302*     · Diabetes mellitus with morning hypoglycemia on levemir and lispro  Patient reports only on 5 units of levemir q h s , will adjust      VTE Pharmacologic Prophylaxis:   Pharmacologic: Heparin  Mechanical VTE Prophylaxis in Place: Yes    Patient Centered Rounds: I have performed bedside rounds with nursing staff today  Discussions with Specialists or Other Care Team Provider: nephrology, case mgt and nursing    Education and Discussions with Family / Patient: patient and spouse    Time Spent for Care: 20 minutes  More than 50% of total time spent on counseling and coordination of care as described above  Current Length of Stay: 4 day(s)    Current Patient Status: Inpatient   Certification Statement: The patient will continue to require additional inpatient hospital stay due to furhter monitoring of abd pain and fatigue  GI to see patient tomorrow     Discharge Plan: pending improvement likely tomorrow     Code Status: Level 1 - Full Code      Subjective:   Patient reporting intermittent abdominal pain and frequent trips to the bathroom upon eating  Objective:     Vitals:   Temp (24hrs), Av 8 °F (36 6 °C), Min:97 °F (36 1 °C), Max:98 5 °F (36 9 °C)    Temp:  [97 °F (36 1 °C)-98 5 °F (36 9 °C)] 97 °F (36 1 °C)  HR:  [71-80] 79  Resp:  [18] 18  BP: (122-155)/(58-72) 127/59  SpO2:  [93 %-99 %] 99 %  Body mass index is 27 22 kg/m²  Input and Output Summary (last 24 hours):        Intake/Output Summary (Last 24 hours) at 8/10/2020 1715  Last data filed at 8/10/2020 1300  Gross per 24 hour   Intake 840 ml   Output 2800 ml   Net -1960 ml       Physical Exam:     Physical Exam  Constitutional:       Appearance: Normal appearance  HENT:      Head: Normocephalic and atraumatic  Eyes:      Extraocular Movements: Extraocular movements intact  Pupils: Pupils are equal, round, and reactive to light  Neck:      Musculoskeletal: Normal range of motion and neck supple  Cardiovascular:      Rate and Rhythm: Normal rate and regular rhythm  Heart sounds: No murmur  Pulmonary:      Effort: Pulmonary effort is normal  No respiratory distress  Breath sounds: Normal breath sounds  No wheezing  Abdominal:      General: Bowel sounds are normal  There is no distension  Palpations: Abdomen is soft  Tenderness: There is no abdominal tenderness  Musculoskeletal:         General: Swelling present  Skin:     General: Skin is warm and dry  Neurological:      General: No focal deficit present  Mental Status: He is alert and oriented to person, place, and time  Psychiatric:         Mood and Affect: Mood normal          Behavior: Behavior normal          Thought Content: Thought content normal          Judgment: Judgment normal            Additional Data:     Labs:    Results from last 7 days   Lab Units 08/10/20  0442  08/06/20  0444   WBC Thousand/uL 6 85   < > 7 76   HEMOGLOBIN g/dL 8 6*   < > 8 3*   HEMATOCRIT % 26 3*   < > 25 2*   PLATELETS Thousands/uL 133*   < > 124*   NEUTROS PCT %  --   --  77*   LYMPHS PCT %  --   --  10*   MONOS PCT %  --   --  11   EOS PCT %  --   --  1    < > = values in this interval not displayed       Results from last 7 days   Lab Units 08/10/20  0442   SODIUM mmol/L 142   POTASSIUM mmol/L 4 1   CHLORIDE mmol/L 104   CO2 mmol/L 33*   BUN mg/dL 55*   CREATININE mg/dL 4 06*   ANION GAP mmol/L 5   CALCIUM mg/dL 8 3   ALBUMIN g/dL 2 7*   TOTAL BILIRUBIN mg/dL 0 40   ALK PHOS U/L 122*   ALT U/L 21   AST U/L 15   GLUCOSE RANDOM mg/dL 94         Results from last 7 days   Lab Units 08/10/20  1612 08/10/20  1140 08/10/20  0751 08/09/20  2104 08/09/20  1606 08/09/20  1048 08/09/20  0904 08/09/20  0730 08/08/20  2045 08/08/20  1553 08/08/20  1051 08/08/20  0841   POC GLUCOSE mg/dl 302* 211* 54* 231* 223* 286* 200* 47* 234* 231* 161* 65                   * I Have Reviewed All Lab Data Listed Above  * Additional Pertinent Lab Tests Reviewed:  Laura 66 Admission Reviewed    Imaging:    Imaging Reports Reviewed Today Include: ct abd   Imaging Personally Reviewed by Myself Includes:  none    Recent Cultures (last 7 days):           Last 24 Hours Medication List:   Current Facility-Administered Medications   Medication Dose Route Frequency Provider Last Rate    acetaminophen  975 mg Oral Q6H PRN Donnita Person, PA-C      aluminum-magnesium hydroxide-simethicone  30 mL Oral Q6H PRN Donnita Person, PA-FOUZIA      aspirin  81 mg Oral Daily Donnita [facility], [place]      atorvastatin  40 mg Oral QPM Donnita Person, ROBBY      b complex-vitamin C-folic acid  1 capsule Oral Daily With ROMANA Bui [address] [place] [address]      cholecalciferol  1,000 Units Oral Daily Donnita [facility], [place]      furosemide  40 mg Oral Daily Donnita Person, PA-FOUZIA      heparin (porcine)  5,000 Units Subcutaneous Q8H [address] Ady Suggs, DO      insulin detemir  5 Units Subcutaneous HS Ady Suggs, DO      insulin lispro  1-6 Units Subcutaneous TID AC Alberto Munoz PA-C      insulin lispro  1-6 Units Subcutaneous HS [facility] Person, ROBBY      insulin lispro  4 Units Subcutaneous TID  Alberto Munoz PA-C      iohexol  50 mL Oral Once in imaging Hayden Sewell MD PhD      Lidocaine Viscous HCl  15 mL Swish & Swallow 4x Daily PRN NEUSIEDL, PA-C      magnesium hydroxide  30 mL Oral Daily PRN Donnita Person, PA-FOUZIA      magnesium oxide  400 mg Oral BID Donnita Person, PA-FOUZIA      metoprolol tartrate  12 5 mg Oral Q12H [address] [facility], PAFREDI      NIFEdipine ER  60 mg Oral Daily [facility], [place]      ondansetron  4 mg Intravenous Q6H PRN Donnita Person, PA-FOUZIA      pantoprazole  40 mg Oral Early Morning Konrad Singer [place]      sodium bicarbonate  650 mg Oral TID [facility] Konrad Singer, [place]      tamsulosin  0 4 mg Oral Daily With Yunier Cifuentes [place]          Today, Patient Was Seen By: RIKA Bravo    ** Please Note: Dictation voice to text software may have been used in the creation of this document   **

## 2020-08-10 NOTE — ASSESSMENT & PLAN NOTE
· Anemia likely secondary to CKD and possibility to chemotherapy  · Hemoglobin stable at this time    Results from last 7 days   Lab Units 08/10/20  0442 08/09/20  0551 08/08/20  0522 08/07/20  0634 08/06/20  0444 08/06/20  0050   HEMOGLOBIN g/dL 8 6* 8 7* 9 5* 8 5* 8 3* 8 6*

## 2020-08-10 NOTE — UTILIZATION REVIEW
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  Cardiology did not see Afib on first 2 EKG's done in ER  Trops were neg  Possible chest pain from severe anemia:  Hem/onc consulted -  h/o normocytic anemia, thought 2/2 CKD,  hgb  Stable (8 5 is baseline)  Nephrology consulted for    H/o  CKD stage 4 -  5,  Monitoring  8/9  Continues c/o abd pain  Ordered CTAP,  Suspicious for enterocolitis and   reflux esophagitis  Cont protonix     8/10  Chest pressure thought to be 2/2 GERD  (neg cardiac wup & epigastric complaints worsening after meals)   CTAP finding suspicious for enterocolitis  No bowel obstruction  CONSULT GI     Pertinent Labs/Diagnostic Results:   8/9  CTAP revealed  1   Bowel wall thickening of loops of small bowel in the lower central and right lower quadrant of the abdomen as well as mild bowel wall thickening of the ascending colon   Findings are suspicious for enterocolitis  Suhail Hammed is no evidence of bowel   obstruction  2  Suhail Hammed is a small amount of ascites throughout the abdomen and pelvis  3   Similar appearance of the calcified central mesenteric mass at oh (carcinoid) and adjacent prominent lymph nodes  4   Stable heterogeneous hypodense masses throughout the liver similar to prior exams in keeping with history of metastases  5   Small bilateral pleural effusions and adjacent compressive atelectasis  6   Reflux of contrast into the distal esophagus   Correlate clinically for reflux esophagitis           Results from last 7 days   Lab Units 08/10/20  0442 08/09/20  0551 08/08/20  0522 08/07/20  0634 08/06/20  0444 08/06/20  0050   WBC Thousand/uL 6 85 7 72 8 93 7 91 7 76 8 74   HEMOGLOBIN g/dL 8 6* 8 7* 9 5* 8 5* 8 3* 8 6*   HEMATOCRIT % 26 3* 26 8* 29 3* 26 2* 25 2* 25 9*   PLATELETS Thousands/uL 133* 137* 163 139* 124* 136*   NEUTROS ABS Thousands/µL  --   --   --   --  5 91 7 23     Results from last 7 days   Lab Units 08/07/20  0634   RETIC CT ABS  67,100   RETIC CT PCT % 2 44*     Results from last 7 days   Lab Units 08/10/20  0442 08/09/20  0551 08/08/20  0522 08/07/20  0634 08/06/20  0444   SODIUM mmol/L 142 142 140 141 142   POTASSIUM mmol/L 4 1 4 1 4 4 4 2 3 9   CHLORIDE mmol/L 104 105 102 105 103   CO2 mmol/L 33* 30 27 27 28   ANION GAP mmol/L 5 7 11 9 11   BUN mg/dL 55* 56* 53* 51* 47*   CREATININE mg/dL 4 06* 4 20* 4 15* 4 00* 3 66*   EGFR ml/min/1 73sq m 13 12 13 13 15   CALCIUM mg/dL 8 3 8 6 8 4 8 1* 8 4   MAGNESIUM mg/dL  --   --   --   --  1 9     Results from last 7 days   Lab Units 08/10/20  0442 08/07/20  0634 08/06/20  0050   AST U/L 15 14 16   ALT U/L 21 23 24   ALK PHOS U/L 122* 116 126*   TOTAL PROTEIN g/dL 5 9* 5 6* 6 4   ALBUMIN g/dL 2 7* 2 5* 3 2*   TOTAL BILIRUBIN mg/dL 0 40 0 40 0 50     Results from last 7 days   Lab Units 08/10/20  1612 08/10/20  1140 08/10/20  0751 08/09/20  2104 08/09/20  1606 08/09/20  1048 08/09/20  0904 08/09/20  0730 08/08/20  2045 08/08/20  1553 08/08/20  1051 08/08/20  0841   POC GLUCOSE mg/dl 302* 211* 54* 231* 223* 286* 200* 47* 234* 231* 161* 65     Results from last 7 days   Lab Units 08/10/20  0442 08/09/20  0551 08/08/20  0522 08/07/20  0634 08/06/20  0444 08/06/20  0050   GLUCOSE RANDOM mg/dL 94 51* 98 63* 133 192*             BETA-HYDROXYBUTYRATE   Date Value Ref Range Status   02/05/2019 0 11 0 02 - 0 27 mmol/L Final          Results from last 7 days   Lab Units 08/07/20  2353 08/06/20  1206 08/06/20  0814 08/06/20  0436 08/06/20  0050   TROPONIN I ng/mL <0 02 <0 02 <0 02 <0 02 <0 02     Results from last 7 days   Lab Units 08/06/20  0050   D-DIMER QUANTITATIVE ug/ml FEU 2 76*     Results from last 7 days   Lab Units 08/06/20  0815   PTT seconds 83*     Vital Signs:   08/10/20 0752   98 5 °F (36 9 °C) 71   18   155/72      95 %           08/08 0701   08/09 0700  08/09 0701   08/10 0700  08/10 0701   08/11 0700    P  O   180  1020  940    Total Intake(mL/kg)  180 (2 2)  1020 (12 6)  940 (11 6)    Urine (mL/kg/hr)  950 (0 5)  4400 (2 3)  1800 (1 9)    Stool  0      Total Output  950  4400  1800    Net  -770  -3380  -860          Unmeasured Stool Occurrence  3 x          Medications:   Scheduled Medications:  aspirin, 81 mg, Oral, Daily  atorvastatin, 40 mg, Oral, QPM  b complex-vitamin C-folic acid, 1 capsule, Oral, Daily With Dinner  cholecalciferol, 1,000 Units, Oral, Daily  furosemide, 40 mg, Oral, Daily  heparin (porcine), 5,000 Units, Subcutaneous, Q8H NATACHA  insulin detemir, 5 Units, Subcutaneous, HS  insulin lispro, 1-6 Units, Subcutaneous, TID AC  insulin lispro, 1-6 Units, Subcutaneous, HS  insulin lispro, 4 Units, Subcutaneous, TID AC  magnesium oxide, 400 mg, Oral, BID  metoprolol tartrate, 12 5 mg, Oral, Q12H NATACHA  NIFEdipine ER, 60 mg, Oral, Daily  pantoprazole, 40 mg, Oral, Early Morning  sodium bicarbonate, 650 mg, Oral, TID AC  tamsulosin, 0 4 mg, Oral, Daily With Dinner      Continuous IV Infusions:     PRN Meds:  acetaminophen, 975 mg, Oral, Q6H PRN  aluminum-magnesium hydroxide-simethicone, 30 mL, Oral, Q6H PRN  iohexol, 50 mL, Oral, Once in imaging  Lidocaine Viscous HCl, 15 mL, Swish & Swallow, 4x Daily PRN  magnesium hydroxide, 30 mL, Oral, Daily PRN  ondansetron, 4 mg, Intravenous, Q6H PRN        Discharge Plan: [email or web address]     Network Utilization Review Department  [email or web address]  ATTENTION: Please call with any questions or concerns to [phone] and carefully listen to the prompts so that you are directed to the right person  All voicemails are confidential   Seamus Connelly all requests for admission clinical reviews, approved or denied determinations and any other requests to dedicated fax number below belonging to the campus where the patient is receiving treatment   List of dedicated fax numbers for the Facilities:  [address] [facility] DENIALS (Administrative/Medical Necessity) [phone]   [address]  (Maternity/NICU/Pediatrics) [phone]   Rylie Bojorquez [phone]   [facility] [phone]   Marthareena Ford [phone]   Karenann Deal [facility] [address] [facility] [phone]   [facility]  [phone]   [address] [place] [address], [address]  [address] [facility] [address] [facility] [phone]

## 2020-08-10 NOTE — ASSESSMENT & PLAN NOTE
· Chronic diastolic CHF    Continue furosemide unless renal function were to continue to worsen  · Currently stable

## 2020-08-10 NOTE — ASSESSMENT & PLAN NOTE
Lab Results   Component Value Date    HGBA1C 7 5 (A) 06/18/2020     Recent Labs     08/09/20  2104 08/10/20  0751 08/10/20  1140 08/10/20  1612   POCGLU 231* 54* 211* 302*     · Diabetes mellitus with morning hypoglycemia on levemir and lispro    Patient reports only on 5 units of levemir q h s , will adjust

## 2020-08-10 NOTE — ASSESSMENT & PLAN NOTE
· Follows with Dr Eva Wesley as outpatient  · Currently undergoing monthly chemo injections at [facility]    Results from last 7 days   Lab Units 08/10/20  0442 08/09/20  0551 08/08/20  0522 08/07/20  0634 08/06/20  0444 08/06/20  0050   HEMOGLOBIN g/dL 8 6* 8 7* 9 5* 8 5* 8 3* 8 6*

## 2020-08-10 NOTE — ASSESSMENT & PLAN NOTE
· Generalized abdominal pain after eating salad  Symptoms subsided after having multiple bowel movements  · check CT abdomen pelvis-   Bowel wall thickening of loops of small bowel in the lower central and right lower quadrant of the abdomen as well as mild bowel wall thickening of the ascending colon  Findings are suspicious for enterocolitis  There is no evidence of bowel obstruction  There is a small amount of ascites throughout the abdomen and pelvis  Similar appearance of the calcified central mesenteric mass at oh (carcinoid) and adjacent prominent lymph nodes  Stable heterogeneous hypodense masses throughout the liver similar to prior exams in keeping with history of                      metastases  Small bilateral pleural effusions and adjacent compressive atelectasis  Reflux of contrast into the distal             esophagus  Correlate clinically for reflux esophagitis    · GI consulted

## 2020-08-10 NOTE — ASSESSMENT & PLAN NOTE
· Left-sided chest pressure prior to admission subsided after aspirin and nitroglycerin, Likely 2/2 to GERD as evidenced by negative cardiac work up and epigastric complaints worsening after meals   · Elevated D-dimer however V/Q scan low probability for pulmonary embolism and heparin infusion discontinued  · May be GI related as patient had recurrent symptoms after eating  salad but symptoms resolved after bowel movement  · Given ongoing abdominal pain check CT abdomen pelvis with oral contrast      Results from last 7 days   Lab Units 08/07/20  2353 08/06/20  1206 08/06/20  0814 08/06/20  0436 08/06/20  0050   TROPONIN I ng/mL <0 02 <0 02 <0 02 <0 02 <0 02

## 2020-08-10 NOTE — ASSESSMENT & PLAN NOTE
· SILVERIO on CKD 4 follows with Dr Sarai Saucedo  · Nephrology following and signed off today     Results from last 7 days   Lab Units 08/10/20  0442 08/09/20  0551 08/08/20  0522 08/07/20  0634 08/06/20  0444 08/06/20  0050   BUN mg/dL 55* 56* 53* 51* 47* 47*   CREATININE mg/dL 4 06* 4 20* 4 15* 4 00* 3 66* 3 59*

## 2020-08-11 PROBLEM — N18.9 CKD (CHRONIC KIDNEY DISEASE): Status: ACTIVE | Noted: 2020-01-01

## 2020-08-11 NOTE — PLAN OF CARE
Problem: Potential for Falls  Goal: Patient will remain free of falls  Description: INTERVENTIONS:  - Assess patient frequently for physical needs  -  Identify cognitive and physical deficits and behaviors that affect risk of falls    -  Elizabeth fall precautions as indicated by assessment   - Educate patient/family on patient safety including physical limitations  - Instruct patient to call for assistance with activity based on assessment  - Modify environment to reduce risk of injury  - Consider OT/PT consult to assist with strengthening/mobility  Outcome: Adequate for Discharge     Problem: PAIN - ADULT  Goal: Verbalizes/displays adequate comfort level or baseline comfort level  Description: Interventions:  - Encourage patient to monitor pain and request assistance  - Assess pain using appropriate pain scale  - Administer analgesics based on type and severity of pain and evaluate response  - Implement non-pharmacological measures as appropriate and evaluate response  - Consider cultural and social influences on pain and pain management  - Notify physician/advanced practitioner if interventions unsuccessful or patient reports new pain  Outcome: Adequate for Discharge     Problem: INFECTION - ADULT  Goal: Absence or prevention of progression during hospitalization  Description: INTERVENTIONS:  - Assess and monitor for signs and symptoms of infection  - Monitor lab/diagnostic results  - Monitor all insertion sites, i e  indwelling lines, tubes, and drains  - Monitor endotracheal if appropriate and nasal secretions for changes in amount and color  - Elizabeth appropriate cooling/warming therapies per order  - Administer medications as ordered  - Instruct and encourage patient and family to use good hand hygiene technique  - Identify and instruct in appropriate isolation precautions for identified infection/condition  Outcome: Adequate for Discharge  Goal: Absence of fever/infection during neutropenic period  Description: INTERVENTIONS:  - Monitor WBC    Outcome: Adequate for Discharge     Problem: SAFETY ADULT  Goal: Patient will remain free of falls  Description: INTERVENTIONS:  - Assess patient frequently for physical needs  -  Identify cognitive and physical deficits and behaviors that affect risk of falls    -  Picher fall precautions as indicated by assessment   - Educate patient/family on patient safety including physical limitations  - Instruct patient to call for assistance with activity based on assessment  - Modify environment to reduce risk of injury  - Consider OT/PT consult to assist with strengthening/mobility  Outcome: Adequate for Discharge  Goal: Maintain or return to baseline ADL function  Description: INTERVENTIONS:  -  Assess patient's ability to carry out ADLs; assess patient's baseline for ADL function and identify physical deficits which impact ability to perform ADLs (bathing, care of mouth/teeth, toileting, grooming, dressing, etc )  - Assess/evaluate cause of self-care deficits   - Assess range of motion  - Assess patient's mobility; develop plan if impaired  - Assess patient's need for assistive devices and provide as appropriate  - Encourage maximum independence but intervene and supervise when necessary  - Involve family in performance of ADLs  - Assess for home care needs following discharge   - Consider OT consult to assist with ADL evaluation and planning for discharge  - Provide patient education as appropriate  Outcome: Adequate for Discharge  Goal: Maintain or return mobility status to optimal level  Description: INTERVENTIONS:  - Assess patient's baseline mobility status (ambulation, transfers, stairs, etc )    - Identify cognitive and physical deficits and behaviors that affect mobility  - Identify mobility aids required to assist with transfers and/or ambulation (gait belt, sit-to-stand, lift, walker, cane, etc )  - Picher fall precautions as indicated by assessment  - Record patient progress and toleration of activity level on Mobility SBAR; progress patient to next Phase/Stage  - Instruct patient to call for assistance with activity based on assessment  - Consider rehabilitation consult to assist with strengthening/weightbearing, etc   Outcome: Adequate for Discharge     Problem: DISCHARGE PLANNING  Goal: Discharge to home or other facility with appropriate resources  Description: INTERVENTIONS:  - Identify barriers to discharge w/patient and caregiver  - Arrange for needed discharge resources and transportation as appropriate  - Identify discharge learning needs (meds, wound care, etc )  - Arrange for interpretive services to assist at discharge as needed  - Refer to Case Management Department for coordinating discharge planning if the patient needs post-hospital services based on physician/advanced practitioner order or complex needs related to functional status, cognitive ability, or social support system  Outcome: Adequate for Discharge     Problem: Knowledge Deficit  Goal: Patient/family/caregiver demonstrates understanding of disease process, treatment plan, medications, and discharge instructions  Description: Complete learning assessment and assess knowledge base    Interventions:  - Provide teaching at level of understanding  - Provide teaching via preferred learning methods  Outcome: Adequate for Discharge     Problem: Prexisting or High Potential for Compromised Skin Integrity  Goal: Skin integrity is maintained or improved  Description: INTERVENTIONS:  - Identify patients at risk for skin breakdown  - Assess and monitor skin integrity  - Assess and monitor nutrition and hydration status  - Monitor labs   - Assess for incontinence   - Turn and reposition patient  - Assist with mobility/ambulation  - Relieve pressure over bony prominences  - Avoid friction and shearing  - Provide appropriate hygiene as needed including keeping skin clean and dry  - Evaluate need for skin moisturizer/barrier cream  - Collaborate with interdisciplinary team   - Patient/family teaching  - Consider wound care consult   Outcome: Adequate for Discharge     Problem: CARDIOVASCULAR - ADULT  Goal: Maintains optimal cardiac output and hemodynamic stability  Description: INTERVENTIONS:  - Monitor I/O, vital signs and rhythm  - Monitor for S/S and trends of decreased cardiac output  - Administer and titrate ordered vasoactive medications to optimize hemodynamic stability  - Assess quality of pulses, skin color and temperature  - Assess for signs of decreased coronary artery perfusion  - Instruct patient to report change in severity of symptoms  Outcome: Adequate for Discharge  Goal: Absence of cardiac dysrhythmias or at baseline rhythm  Description: INTERVENTIONS:  - Continuous cardiac monitoring, vital signs, obtain 12 lead EKG if ordered  - Administer antiarrhythmic and heart rate control medications as ordered  - Monitor electrolytes and administer replacement therapy as ordered  Outcome: Adequate for Discharge

## 2020-08-11 NOTE — ASSESSMENT & PLAN NOTE
· GERD continue pantoprazole  · Reflux seen on CT   · Protonix increased  · Pepcid started  · Carafate started  · outpatinet GI follow up

## 2020-08-11 NOTE — ASSESSMENT & PLAN NOTE
· Anemia likely secondary to CKD and possibility to chemotherapy  · Hemoglobin stable at this time    Results from last 7 days   Lab Units 08/11/20  1154 08/10/20  0442 08/09/20  0551 08/08/20  0522 08/07/20  0634 08/06/20  0444 08/06/20  0050   HEMOGLOBIN g/dL 9 1* 8 6* 8 7* 9 5* 8 5* 8 3* 8 6*

## 2020-08-11 NOTE — ASSESSMENT & PLAN NOTE
· Follows with Dr Glenroy Mcdonald as outpatient  · Currently undergoing monthly chemo injections at [facility]    Results from last 7 days   Lab Units 08/11/20  1154 08/10/20  0442 08/09/20  0551 08/08/20  0522 08/07/20  0634 08/06/20  0444 08/06/20  0050   HEMOGLOBIN g/dL 9 1* 8 6* 8 7* 9 5* 8 5* 8 3* 8 6*

## 2020-08-11 NOTE — ASSESSMENT & PLAN NOTE
· SILVERIO on CKD 4 follows with Dr Pena Running  · Nephrology signed off     Results from last 7 days   Lab Units 08/11/20  1153 08/10/20  0442 08/09/20  0551 08/08/20  0522 08/07/20  0634 08/06/20  0444 08/06/20  0050   BUN mg/dL 57* 55* 56* 53* 51* 47* 47*   CREATININE mg/dL 4 23* 4 06* 4 20* 4 15* 4 00* 3 66* 3 59*

## 2020-08-11 NOTE — ASSESSMENT & PLAN NOTE
· Generalized abdominal pain after eating salad  Symptoms subsided after having multiple bowel movements  · check CT abdomen pelvis-   Bowel wall thickening of loops of small bowel in the lower central and right lower quadrant of the abdomen as well as mild bowel wall thickening of the ascending colon  Findings are suspicious for enterocolitis  There is no evidence of bowel obstruction  There is a small amount of ascites throughout the abdomen and pelvis  Similar appearance of the calcified central mesenteric mass at oh (carcinoid) and adjacent prominent lymph nodes  Stable heterogeneous hypodense masses throughout the liver similar to prior exams in keeping with history of                      metastases  Small bilateral pleural effusions and adjacent compressive atelectasis  Reflux of contrast into the distal             esophagus  Correlate clinically for reflux esophagitis  · GI consulted and multiple med changes    Patient will follow up outpatient

## 2020-08-11 NOTE — ASSESSMENT & PLAN NOTE
· Left-sided chest pressure prior to admission subsided after aspirin and nitroglycerin, Likely 2/2 to GERD as evidenced by negative cardiac work up and epigastric complaints worsening after meals   · Elevated D-dimer however V/Q scan low probability for pulmonary embolism and heparin infusion discontinued  · May be GI related as patient had recurrent symptoms after eating  salad but symptoms resolved after bowel movement  · GI consulted-  Multiple med changes and patient to follow up outpatient setting       Results from last 7 days   Lab Units 08/07/20  2353 08/06/20  1206 08/06/20  0814 08/06/20  0436 08/06/20  0050   TROPONIN I ng/mL <0 02 <0 02 <0 02 <0 02 <0 02

## 2020-08-11 NOTE — DISCHARGE SUMMARY
[address] [facility]  Discharge- Brandie Dietz 1940, 79 y o  male MRN: [id number]    Unit/Bed#: -01 Encounter: [id number]    Primary Care Provider: RIKA Marie   Date and time admitted to hospital: 8/6/2020 12:32 AM        * Chest pressure  Assessment & Plan  · Left-sided chest pressure prior to admission subsided after aspirin and nitroglycerin, Likely 2/2 to GERD as evidenced by negative cardiac work up and epigastric complaints worsening after meals   · Elevated D-dimer however V/Q scan low probability for pulmonary embolism and heparin infusion discontinued  · May be GI related as patient had recurrent symptoms after eating  salad but symptoms resolved after bowel movement  · GI consulted-  Multiple med changes and patient to follow up outpatient setting       Results from last 7 days   Lab Units 08/07/20  2353 08/06/20  1206 08/06/20  0814 08/06/20  0436 08/06/20  0050   TROPONIN I ng/mL <0 02 <0 02 <0 02 <0 02 <0 02       Abdominal pain  Assessment & Plan  · Generalized abdominal pain after eating salad  Symptoms subsided after having multiple bowel movements  · check CT abdomen pelvis-   Bowel wall thickening of loops of small bowel in the lower central and right lower quadrant of the abdomen as well as mild bowel wall thickening of the ascending colon  Findings are suspicious for enterocolitis  There is no evidence of bowel obstruction  There is a small amount of ascites throughout the abdomen and pelvis  Similar appearance of the calcified central mesenteric mass at oh (carcinoid) and adjacent prominent lymph nodes  Stable heterogeneous hypodense masses throughout the liver similar to prior exams in keeping with history of                      metastases  Small bilateral pleural effusions and adjacent compressive atelectasis  Reflux of contrast into the distal             esophagus  Correlate clinically for reflux esophagitis    · GI consulted and multiple med changes  Patient will follow up outpatient     Gastroesophageal reflux disease without esophagitis  Assessment & Plan  · GERD continue pantoprazole  · Reflux seen on CT   · Protonix increased  · Pepcid started  · Carafate started  · outpatinet GI follow up       Normocytic anemia  Assessment & Plan  · Anemia likely secondary to CKD and possibility to chemotherapy  · Hemoglobin stable at this time    Results from last 7 days   Lab Units 08/11/20  1154 08/10/20  0442 08/09/20  0551 08/08/20  0522 08/07/20  0634 08/06/20  0444 08/06/20  0050   HEMOGLOBIN g/dL 9 1* 8 6* 8 7* 9 5* 8 5* 8 3* 8 6*       CKD (chronic kidney disease)  Assessment & Plan  · CKD stage 4 to 5 fluctuating  Baseline serum creatinine between 3 5- 4   · Follow up with outpatient nephrology     Elevated d-dimer  Assessment & Plan  · Elevated D-dimer with chest pain    V/Q scan low probability for PE    Urinary retention  Assessment & Plan  · Chronic urinary retention on tamsulosin  · Follows with Dr Lyndsay Antonio  · 1+ edema bilateral feet  · Continue home dose Lasix 40mg (takes PRN for edema)    Hypertensive kidney disease with stage 4 chronic kidney disease ([facility] [address] )  Assessment & Plan  · SILVERIO on CKD 4 follows with Dr Maria C Snyder  · Nephrology signed off     Results from last 7 days   Lab Units 08/11/20  1153 08/10/20  0442 08/09/20  0551 08/08/20  0522 08/07/20  0634 08/06/20  0444 08/06/20  0050   BUN mg/dL 57* 55* 56* 53* 51* 47* 47*   CREATININE mg/dL 4 23* 4 06* 4 20* 4 15* 4 00* 3 66* 5 64*       Diastolic dysfunction  Assessment & Plan  · Chronic diastolic CHF  · Currently stable      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)  Assessment & Plan  · Follows with Dr Orlando Villanueva as outpatient  · Currently undergoing monthly chemo injections at [facility]    Results from last 7 days   Lab Units 08/11/20  1154 08/10/20  0442 08/09/20  0551 08/08/20  0522 08/07/20  0634 08/06/20  0444 08/06/20  0050   HEMOGLOBIN g/dL 9 1* 8 6* 8 7* 9 5* 8  5* 8 3* 8 6*       Essential hypertension  Assessment & Plan  · Essential hypertension continue nifedipine and metoprolol    Type 2 diabetes mellitus with hyperglycemia, with long-term current use of insulin [facility])  Assessment & Plan  Lab Results   Component Value Date    HGBA1C 7 5 (A) 06/18/2020     Recent Labs     08/10/20  1612 08/10/20  2049 08/11/20  0731 08/11/20  1113   POCGLU 302* 238* 72 304*     · Diabetes mellitus with morning hypoglycemia on levemir and lispro  Patient reports only on 5 units of levemir q h s  · Follow up with PCP         Discharging Physician / Practitioner: Lilly Estrada  PCP: Lilly Tam  Admission Date:   Admission Orders (From admission, onward)     Ordered        08/06/20 0154  Inpatient Admission  Once                   Discharge Date: 08/11/20    Resolved Problems  Date Reviewed: 8/11/2020    None          Consultations During Hospital Stay:  · Nephrology   · Gastroenterology  · Oncology  · Cardiology     Procedures Performed:   · none    Significant Findings / Test Results:   · CT abd pelvis-  Bowel wall thickening of loops of small bowel in the lower central and right lower quadrant of the abdomen as well as mild bowel wall thickening of the ascending colon  Findings are suspicious for enterocolitis  There is no evidence of bowel obstruction  There is a small amount of ascites throughout the abdomen and pelvis  Similar appearance of the calcified central mesenteric mass at oh (carcinoid) and adjacent prominent lymph nodes  Stable heterogeneous hypodense masses throughout the liver similar to prior exams in keeping with history of metastases  Small bilateral pleural effusions and adjacent compressive atelectasis  Reflux of contrast into the distal esophagus  Correlate clinically for reflux esophagitis      Incidental Findings:   · Above imagine     Test Results Pending at Discharge (will require follow up):   · *none     Outpatient Tests Requested:  · Bmp     Complications:  none    Reason for Admission: chest pressure and shortness of breath     Hospital Course:     Oc Mcclendon is a 79 y o  male patient who originally presented to the hospital on 8/6/2020 due to Lt sided chest pressure with associated SOB  Pain resolved in ED after nitro and asa  Patient was in afib upon presentation and then converted to NSR  Patient has history of neuroendocrine tumor with liver mets, anemia, CHF, hypertension, afib  Patient with lengthy hospital course, please refer to epic for review  Please see above list of diagnoses and related plan for additional information  Condition at Discharge: stable     Discharge Day Visit / Exam:     Subjective:  Patient offers no major complaints  Still with occasional stools after meals  Vitals: Blood Pressure: (!) 178/79 (08/11/20 0700)  Pulse: 75 (08/11/20 0700)  Temperature: 97 9 °F (36 6 °C) (08/11/20 0700)  Temp Source: Oral (08/11/20 0700)  Respirations: 18 (08/11/20 0700)  Height: 5' 8" (172 7 cm) (08/06/20 0035)  Weight - Scale: 75 7 kg (166 lb 14 4 oz) (08/11/20 1333)  SpO2: 97 % (08/11/20 0700)  Exam:   Physical Exam  Vitals signs and nursing note reviewed  Constitutional:       Appearance: Normal appearance  HENT:      Head: Normocephalic and atraumatic  Eyes:      Pupils: Pupils are equal, round, and reactive to light  Neck:      Musculoskeletal: Normal range of motion  Cardiovascular:      Rate and Rhythm: Normal rate and regular rhythm  Heart sounds: No murmur  Pulmonary:      Effort: Pulmonary effort is normal       Breath sounds: Normal breath sounds  Abdominal:      General: Bowel sounds are normal    Musculoskeletal: Normal range of motion  Right lower leg: No edema  Left lower leg: No edema  Skin:     General: Skin is warm and dry  Neurological:      Mental Status: He is alert and oriented to person, place, and time  Mental status is at baseline     Psychiatric: Mood and Affect: Mood normal          Behavior: Behavior normal          Discussion with Family: spouse at bedside     Discharge instructions/Information to patient and family:   See after visit summary for information provided to patient and family  Provisions for Follow-Up Care:  See after visit summary for information related to follow-up care and any pertinent home health orders  Disposition:     Home    For Discharges to [facility] SNF:   · Not Applicable to this Patient - Not Applicable to this Patient    Planned Readmission: no plan for readmission      Discharge Statement:  I spent 40 minutes discharging the patient  This time was spent on the day of discharge  I had direct contact with the patient on the day of discharge  Greater than 50% of the total time was spent examining patient, answering all patient questions, arranging and discussing plan of care with patient as well as directly providing post-discharge instructions  Additional time then spent on discharge activities  Discharge Medications:  See after visit summary for reconciled discharge medications provided to patient and family        ** Please Note: This note has been constructed using a voice recognition system **

## 2020-08-11 NOTE — CONSULTS
Consultation - [facility]  Homer Marcelino 79 y o  male MRN: [id number]  Unit/Bed#: -01 Encounter: [id number]      Inpatient consult to gastroenterology  Consult performed by: Clementina Siu PA-C  Consult ordered by: RIKA Phipps       "Click on Consult Button"     Reason for Consult / Principal Problem:  Abnormal CT scan suggesting esophagitis/GERD/frequent stools    HPI: Homer Marcelino is a 79y o  year old male who presents with past medical history significant for gastroesophageal reflux disease, neuroendocrine tumor with mets to the liver, diastolic CHF, CKD stage 4, and urinary retention  Reason for GI consultation is for abdominal pain, frequent stools, as well as abnormal CT scan suggesting esophagitis and enterocolitis in a patient with chest pain  Patient reports a longstanding history of acid reflux disease  He did have an endoscopy done 2 years ago and that is when he was started on pantoprazole 40 mg daily  Patient presented to the emergency department initially on August 6, 2020 with left-sided chest pressure that subsided after taking aspirin and nitroglycerin  Patient has had a negative cardiac workup  Patient also had an episode of abdominal pain yesterday after eating a salad  Patient's abdominal pain subsided after he had had a large bowel movement  Patient's wife does report that he does have several episodes of stool a day generally 2-3 times  Although 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  Patient currently denies any melena or rectal bleeding  Patient completely reports resolution of all of his gastrointestinal symptoms today  Patient wishes to go home as soon as possible  Patient denies any dysphagia  REVIEW OF SYSTEMS:     CONSTITUTIONAL: Denies any fever, chills, or rigors  Good appetite, and no recent weight loss  HEENT: No earache or tinnitus  Denies hearing loss or visual disturbances    CARDIOVASCULAR: No chest pain or palpitations  RESPIRATORY: Denies any cough, hemoptysis, shortness of breath or dyspnea on exertion  GASTROINTESTINAL: As noted in the History of Present Illness  GENITOURINARY: No problems with urination  Denies any hematuria or dysuria  NEUROLOGIC: No dizziness or vertigo, denies headaches  MUSCULOSKELETAL: Denies any muscle or joint pain  SKIN: Denies skin rashes or itching  ENDOCRINE: Denies excessive thirst  Denies intolerance to heat or cold  PSYCHOSOCIAL: Denies depression or anxiety  Denies any recent memory loss  Historical Information   Past Medical History:   Diagnosis Date    Acute pancreatitis without infection or necrosis 9/26/2019    Anemia of chronic renal failure     BPH (benign prostatic hyperplasia)     Chronic kidney disease (CKD), stage IV (severe)     Coronary artery disease     DJD (degenerative joint disease)     Essential hypertension     Gait disturbance     Generalized weakness     GERD (gastroesophageal reflux disease)     Gout     History of transfusion     Hydronephrosis     Hyperlipidemia     Hypertension     Liver cancer     Pressure injury of skin     Proteinuria     Renal disorder     Retention of urine     Thrombophlebitis migrans     Type 2 diabetes mellitus      Past Surgical History:   Procedure Laterality Date    CHOLECYSTECTOMY      CHOLECYSTECTOMY LAPAROSCOPIC N/A 2/7/2020    Procedure: CHOLECYSTECTOMY LAPAROSCOPIC;  Surgeon:  Joseph Ulloa MD;  Location: [facility] MAIN OR;  Service: General    CORONARY ANGIOPLASTY WITH STENT PLACEMENT      FL RETROGRADE PYELOGRAM  5/21/2020    IR IMAGE GUIDED BIOPSY/ASPIRATION LIVER  1/24/2019    IR TUBE PLACEMENT ROQUE  9/6/2019    VT CYSTO/URETERO W/LITHOTRIPSY &INDWELL STENT INSRT Left 5/21/2020    Procedure: CYSTOSCOPY URETEROSCOPY WITH LITHOTRIPSY HOLMIUM LASER, RETROGRADE PYELOGRAM AND INSERTION STENT URETERAL;  Surgeon: Kalie Llanos MD;  Location: [facility] MAIN OR;  Service: Urology    VT CYSTOURETHROSCOPY,URETER CATHETER Left 4/21/2020    Procedure: CYSTOSCOPY WITH INSERTION STENT URETERAL;  Surgeon: Yue Boudreaux MD;  Location: [facility] Main [facility];  Service: Urology     Social History   Social History     Substance and Sexual Activity   Alcohol Use Not Currently    Alcohol/week: 0 0 standard drinks    Frequency: Never     Social History     Substance and Sexual Activity   Drug Use Never     Social History     Tobacco Use   Smoking Status Never Smoker   Smokeless Tobacco Never Used     Family History   Problem Relation Age of Onset    Cancer Mother     Hypertension Father     Cancer Brother     Cancer Maternal Grandmother     Cancer Paternal Aunt        Meds/Allergies     Medications Prior to Admission   Medication    sodium bicarbonate 650 mg tablet    acetaminophen (TYLENOL) 325 mg tablet    aspirin 81 MG tablet    B Complex-C-Folic Acid (TRIPHROCAPS) 1 MG CAPS    BD INSULIN SYRINGE U/F 30G X 1/2" 0 3 ML MISC    cholecalciferol (VITAMIN D3) 1,000 units tablet    furosemide (LASIX) 40 mg tablet    insulin detemir (LEVEMIR) 100 units/mL subcutaneous injection    insulin lispro (HumaLOG) 100 units/mL injection    Insulin Syringe-Needle U-100 30G X 1/2" 1 ML MISC    magnesium oxide (MAG-OX) 400 mg    metoprolol tartrate (LOPRESSOR) 25 mg tablet    NIFEdipine ER (ADALAT CC) 60 MG 24 hr tablet    nystatin (MYCOSTATIN) powder    pantoprazole (PROTONIX) 40 mg tablet    tamsulosin (FLOMAX) 0 4 mg    [DISCONTINUED] colchicine (COLCRYS) 0 6 mg tablet     Current Facility-Administered Medications   Medication Dose Route Frequency    acetaminophen (TYLENOL) tablet 975 mg  975 mg Oral Q6H PRN    aluminum-magnesium hydroxide-simethicone (MYLANTA) 200-200-20 mg/5 mL oral suspension 30 mL  30 mL Oral Q6H PRN    aspirin (ECOTRIN LOW STRENGTH) EC tablet 81 mg  81 mg Oral Daily    atorvastatin (LIPITOR) tablet 40 mg  40 mg Oral QPM    b complex-vitamin C-folic acid (NEPHROCAPS) capsule 1 capsule 1 capsule Oral Daily With Dinner    cholecalciferol (VITAMIN D3) tablet 1,000 Units  1,000 Units Oral Daily    furosemide (LASIX) tablet 40 mg  40 mg Oral Daily    heparin (porcine) subcutaneous injection 5,000 Units  5,000 Units Subcutaneous Q8H [address]    insulin detemir (LEVEMIR) subcutaneous injection 5 Units  5 Units Subcutaneous HS    insulin lispro (HumaLOG) 100 units/mL subcutaneous injection 1-6 Units  1-6 Units Subcutaneous TID AC    insulin lispro (HumaLOG) 100 units/mL subcutaneous injection 1-6 Units  1-6 Units Subcutaneous HS    insulin lispro (HumaLOG) 100 units/mL subcutaneous injection 4 Units  4 Units Subcutaneous TID AC    iohexol (OMNIPAQUE) 240 MG/ML solution 50 mL  50 mL Oral Once in imaging    Lidocaine Viscous HCl (XYLOCAINE) 2 % mucosal solution 15 mL  15 mL Swish & Swallow 4x Daily PRN    magnesium hydroxide (MILK OF MAGNESIA) 400 mg/5 mL oral suspension 30 mL  30 mL Oral Daily PRN    magnesium oxide (MAG-OX) tablet 400 mg  400 mg Oral BID    metoprolol tartrate (LOPRESSOR) partial tablet 12 5 mg  12 5 mg Oral Q12H [address]    NIFEdipine (PROCARDIA XL) 24 hr tablet 60 mg  60 mg Oral Daily    ondansetron (ZOFRAN) injection 4 mg  4 mg Intravenous Q6H PRN    pantoprazole (PROTONIX) EC tablet 40 mg  40 mg Oral Early Morning    sodium bicarbonate tablet 650 mg  650 mg Oral TID AC    tamsulosin (FLOMAX) capsule 0 4 mg  0 4 mg Oral Daily With Dinner       No Known Allergies    Objective   Blood pressure (!) 178/79, pulse 75, temperature 97 9 °F (36 6 °C), temperature source Oral, resp  rate 18, height 5' 8" (1 727 m), weight 81 2 kg (179 lb 0 2 oz), SpO2 97 %      Intake/Output Summary (Last 24 hours) at 8/11/2020 1139  Last data filed at 8/11/2020 0645  Gross per 24 hour   Intake 820 ml   Output 2000 ml   Net -1180 ml         PHYSICAL EXAM:      General Appearance:   Alert, cooperative, no distress, appears stated age    HEENT:   Normocephalic, atraumatic, anicteric      Neck:  Supple, symmetrical, trachea midline, no adenopathy;    thyroid: no enlargement/tenderness/nodules; no carotid  bruit or JVD    Lungs:   Clear to auscultation bilaterally; no rales, rhonchi or wheezing; respirations unlabored    Heart[de-identified]   S1 and S2 normal; regular rate and rhythm; no murmur, rub, or gallop  Abdomen:   Soft, non-tender, non-distended; normal bowel sounds; no masses, no organomegaly    Genitalia:   Deferred    Rectal:   Deferred    Extremities:  No cyanosis, clubbing or edema    Pulses:  2+ and symmetric all extremities    Skin:  Skin color, texture, turgor normal, no rashes or lesions    Lymph nodes:  No palpable cervical, axillary or inguinal lymphadenopathy        Lab Results:   No results displayed because visit has over 200 results  Imaging Studies: I have personally reviewed pertinent imaging studies  ASSESSMENT & PLAN:  GERD  Chest pressure  Esophagitis on CT  Abdominal pain  Loose stool  -At the present time patient denies all gastrointestinal symptoms   -Patient has known gastroesophageal reflux disease  His last endoscopic evaluation was 2 years ago   -At the present time I am recommending patient increase his pantoprazole 40 mg to b i d  Dosing   -Will add Pepcid 40 mg q h s  -Will trial Carafate 1 g slurry 4 times a day  -Will add Carafate 4 g daily   -Will hold on repeat endoscopic evaluation at present  Patient does wish to be discharged home  I told him he could follow-up with us in the outpatient setting   -Patient should continue treatment of his neuroendocrine tumor with Dr Luke Sen in [facility]  Patient will be seen and examined by Corey Dotson PA-C  8/11/2020,11:39 AM

## 2020-08-11 NOTE — PHYSICAL THERAPY NOTE
PT Initial Evaluation  Physical Therapy Evaluation     Patient's Name: Perri Miguel    Admitting Diagnosis  Atrial fibrillation ([facility] ) [I48 91]  Chest pain [R07 9]  Positive D dimer [R79 89]  Type 2 diabetes mellitus with hyperglycemia, with long-term current use of insulin ([address] ) [E11 65, Z79 4]  Pressure injury of right heel, unstageable ([address] ) [L89 610]    Problem List  Patient Active Problem List   Diagnosis    Type 2 diabetes mellitus with hyperglycemia, with long-term current use of insulin ([address] )    Essential hypertension    Mixed hyperlipidemia    Iron deficiency anemia secondary to inadequate dietary iron intake    Syncope and collapse    Liver mass    Neuroendocrine tumor    Acute cystitis without hematuria    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)    Pressure injury of right heel, unstageable ([address] )    Atherosclerosis of artery of extremity with ulceration (HCC)    Carcinoid syndrome ([address] )    Urinary catheter in place    Malignant neuroendocrine neoplasm ([address] )    Abdominal pain    UTI due to extended-spectrum beta lactamase (ESBL) producing Escherichia coli    Hypomagnesemia    Normocytic anemia    Goals of care, counseling/discussion    Hyperparathyroid bone disease (HCC)    Clostridium difficile diarrhea    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    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    Low TSH level    Severe protein-calorie malnutrition (HCC)    Diastolic dysfunction    Premature atrial complexes    Bradycardia    Generalized weakness    Recurrent UTI    Gastroesophageal reflux disease without esophagitis    Ureterolithiasis    Obstructive uropathy    Gait difficulty    Coronary artery disease    Azotemia    Metabolic acidosis    Hypertensive kidney disease with stage 4 chronic kidney disease (HCC)    Stage 4 chronic kidney disease (HCC)    Edema    Chest pain    Chest pressure    Urinary retention    Elevated d-dimer    CKD (chronic kidney disease)       Past Medical History  Past Medical History:   Diagnosis Date    Acute pancreatitis without infection or necrosis 9/26/2019    Anemia of chronic renal failure     BPH (benign prostatic hyperplasia)     Chronic kidney disease (CKD), stage IV (severe)     Coronary artery disease     DJD (degenerative joint disease)     Essential hypertension     Gait disturbance     Generalized weakness     GERD (gastroesophageal reflux disease)     Gout     History of transfusion     Hydronephrosis     Hyperlipidemia     Hypertension     Liver cancer     Pressure injury of skin     Proteinuria     Renal disorder     Retention of urine     Thrombophlebitis migrans     Type 2 diabetes mellitus        Past Surgical History  Past Surgical History:   Procedure Laterality Date    CHOLECYSTECTOMY      CHOLECYSTECTOMY LAPAROSCOPIC N/A 2/7/2020    Procedure: CHOLECYSTECTOMY LAPAROSCOPIC;  Surgeon:  Sadie Alvarado MD;  Location: [facility] MAIN OR;  Service: General    CORONARY ANGIOPLASTY WITH STENT PLACEMENT      FL RETROGRADE PYELOGRAM  5/21/2020    IR IMAGE GUIDED BIOPSY/ASPIRATION LIVER  1/24/2019    IR TUBE PLACEMENT ROQUE  9/6/2019    DE CYSTO/URETERO W/LITHOTRIPSY &INDWELL STENT INSRT Left 5/21/2020    Procedure: CYSTOSCOPY URETEROSCOPY WITH LITHOTRIPSY HOLMIUM LASER, RETROGRADE PYELOGRAM AND INSERTION STENT URETERAL;  Surgeon: Finn Klein MD;  Location: [facility] MAIN OR;  Service: Urology    DE CYSTOURETHROSCOPY,URETER CATHETER Left 4/21/2020    Procedure: CYSTOSCOPY WITH INSERTION STENT URETERAL;  Surgeon: Lloyd Dominguez MD;  Location: [facility] Main OR;  Service: Urology            08/11/20 1117   Note Type   Note type Eval only   Pain Assessment   Pain Assessment Tool Pain Assessment not indicated - pt denies pain   Pain Score No Pain   Home Living   Type of [address] [place] [address] One level;Ramped entrance   Bathroom Shower/Tub Tub/shower unit   Bathroom Toilet Standard  (with commode)   [address] chair   Home Equipment Walker  (rollator, scooter, w/c, cane)   Additional Comments commode in bedroom too   Prior Function   Level of [place] Needs assistance with ADLs and functional mobility   Lives With Spouse   Receives Help From Family   ADL Assistance Independent   IADLs Needs assistance   Falls in the last 6 months 0   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, 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?)   Restrictions/Precautions   [place] Bearing Precautions Per Order No   Other Precautions Fall Risk; Chair Alarm; Bed Alarm   General   Family/Caregiver Present Yes   Cognition   Overall Cognitive Status WFL   Arousal/Participation Alert   Orientation Level Oriented X4   Following Commands Follows all commands and directions without difficulty   RLE Assessment   RLE Assessment   (4/5 grossly)   LLE Assessment   LLE Assessment   (4/5 grossly)   Bed Mobility   Supine to Sit 5  Supervision   Additional items Assist x 1; Increased time required;Verbal cues   Additional Comments Pt  seen initially 11:17 to 11:30 for history and then seen 1345 to 1355 for mobility  Transfers   Sit to Stand 4  Minimal assistance   Additional items Assist x 1; Increased time required;Verbal cues   Stand to Sit 4  Minimal assistance   Additional items Assist x 1; Increased time required;Verbal cues   Ambulation/Elevation   Gait pattern Decreased foot clearance; Short stride; Step to;Excessively slow  (decreased knee extension, forward flexion)   Gait Assistance 4  Minimal assist   Additional items Assist x 1   Assistive Device Rolling walker   Distance 40'   Stair Management Assistance Not tested   Balance   Static Sitting Good   Dynamic Sitting Fair +   Static Standing Fair -   Dynamic Standing Fair -   Ambulatory Fair -   Endurance Deficit   Endurance Deficit Yes   Endurance Deficit Description fatigue   Activity Tolerance   Activity Tolerance Patient limited by fatigue   Nurse Made Aware RN ok to see, notified pt   on commode and wanted a few minutes, pt  educated to ring bell when complete  Pt  spouse present  Assessment   Prognosis Good   Problem List Decreased strength;Decreased endurance; Impaired balance;Decreased mobility;Pain   Assessment Pt is 79 y o  male seen for PT evaluation s/p admit to [facility] on 8/6/2020 w/ Chest pressure  PT consulted to assess pt's functional mobility and d/c needs  Order placed for PT eval and tx, w/ activity order  Comorbidities affecting pt's physical performance at time of assessment include: T2 DM, HTN, abdominal pain, CKD, urinary catheter present,  GERD, CA  PTA, pt was supervision w/ all functional mobility w/ RW and assist with ADLS  Personal factors affecting pt at time of IE include: ambulating w/ assistive device, inability to navigate community distances, inability to perform IADLs, inability to perform ADLs and inability to live alone  Please find objective findings from PT assessment regarding body systems outlined above with impairments and limitations including weakness, impaired balance, decreased endurance, gait deviations, decreased activity tolerance, decreased functional mobility tolerance and fall risk  The following objective measures performed on IE also reveal limitations: Barthel Index: 35/100  Pt's clinical presentation is currently unstable/unpredictable seen in pt's presentation  Pt to benefit from continued PT tx to address deficits as defined above and maximize level of functional independent mobility and consistency  From PT/mobility standpoint, recommendation at time of d/c would be Home PT with family support pending progress in order to facilitate return to PLOF  Barriers to Discharge None   Goals   Patient Goals to go home and get therapy there   STG Expiration Date 08/25/20   Short Term Goal #1 1  Pt will complete bed mobility with Mod I to increase functional mobility  2  Pt will complete sit to stand transfers with Mod I to increase functional mobility   3  Pt will ambulate 150 ft with RW with Mod I without LOB 4  Pt will increase B/L LE strength by 1 grade to facilitate improved functional mobility with decreased risk of falls  5  Pt will increase standing balance to fair in order to decrease risk of falls  PT Treatment Day 0   Plan   Treatment/Interventions Functional transfer training;LE strengthening/ROM; Elevations; Therapeutic exercise; Endurance training;Bed mobility;Gait training;Equipment eval/education;Patient/family training   PT Frequency   (3-5x/week)   Recommendation   PT Discharge Recommendation Home with skilled therapy; Return to previous environment with social support   Equipment Recommended Walker   PT - OK to Discharge Yes   Additional Comments when medically stable   Barthel Index   Feeding 10   Bathing 0   Grooming Score 0   Dressing Score 5   Bladder Score 0   Bowels Score 5   Toilet Use Score 5   Transfers (Bed/Chair) Score 10   Mobility (Level Surface) Score 0   Stairs Score 0   Barthel Index Score 35       Shelli Boucher, PT

## 2020-08-11 NOTE — ASSESSMENT & PLAN NOTE
· CKD stage 4 to 5 fluctuating    Baseline serum creatinine between 3 5- 4   · Follow up with outpatient nephrology

## 2020-08-11 NOTE — SOCIAL WORK
CM met with pt and girlfriend Roxanna Palmer at bedside  Pt to be discharged home with Jamie   Roxanna Palmer will transport home  IMM reviewed and signed by pt  Copy to pt and copy to MR for scanning  [id number]  -CM sent in basket for [address] appoint  -CM sent in basket for OP/CN  Info provided for Access to [facility]

## 2020-08-11 NOTE — ASSESSMENT & PLAN NOTE
Lab Results   Component Value Date    HGBA1C 7 5 (A) 06/18/2020     Recent Labs     08/10/20  1612 08/10/20  2049 08/11/20  0731 08/11/20  1113   POCGLU 302* 238* 72 304*     · Diabetes mellitus with morning hypoglycemia on levemir and lispro    Patient reports only on 5 units of levemir q h s  · Follow up with PCP

## 2020-08-12 NOTE — UTILIZATION REVIEW
Notification of Discharge  This is a Notification of Discharge from our facility [address]  Please be advised that this patient has been discharge from our facility  Below you will find the admission and discharge date and time including the patients disposition  PRESENTATION DATE: 8/6/2020 12:32 AM  OBS ADMISSION DATE:   IP ADMISSION DATE: 8/6/20 0154   DISCHARGE DATE: 8/11/2020  3:45 PM  [facility]   Admission Orders listed below:  Admission Orders (From admission, onward)     Ordered        08/06/20 0154  Inpatient Admission  Once                   Please contact the UR Department if additional information is required to close this patient's authorization/case  [facility] Utilization Review Department  Main: [phone] x carefully listen to the prompts  All voicemails are confidential   [email or web address]  Send all requests for admission clinical reviews, approved or denied determinations and any other requests to dedicated fax number below belonging to the campus where the patient is receiving treatment   List of dedicated fax numbers:  [address] DENIALS (Administrative/Medical Necessity) [phone]   [address] (Maternity/NICU/Pediatrics) [phone]   Iva Mccloud [phone]   Thereasa Roys [phone]   Toy Agustín [phone]   [address] [phone]   [facility]  [phone]   [address] [facility]  [address] [phone]

## 2020-08-17 PROBLEM — A04.72 CLOSTRIDIUM DIFFICILE DIARRHEA: Chronic | Status: RESOLVED | Noted: 2020-03-03 | Resolved: 2020-01-01

## 2020-08-17 NOTE — PROGRESS NOTES
Nephrology   Office Follow-Up  Brandie Dietz 79 y o  male MRN: [id number]    Encounter: [id number]        Assessment & Plan  Rachel Watt was seen in the [facility] office  Diagnoses and all orders for this visit:    1  Stage 4 chronic kidney disease ([facility] )  · Baseline eGFR prior to hospitalizations with AKIs trending around 15 mil/min  Recent SILVERIO incurred which improved with IVF  Most recent eGFR 12 mil/min -> 14 mil/min  · Most recent imaging shows atrophic kidneys- no signs of obstruction or catheter failure  · Not a candidate for ace/arb due to decreased eGFR  · He will continue to avoid nephrotoxins and NSAIDs  He is now on PPI therapy and will be mindful of this going forward  RTO 2 months with updated blood work   -     CBC and differential; Future   -     Comprehensive metabolic panel; Future   -     Phosphorus; Future   -     Microalbumin / creatinine urine ratio; Future  2  Hypomagnesemia   -     Magnesium; Future  3  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  ([facility] )  4  Gastroesophageal reflux disease without esophagitis  · Started on PPI and carafate during last admission  Stable at this point  5  Type 2 diabetes mellitus with hypoglycemia, with long-term current use of insulin ([facility] )  · Rachel Watt was experiencing hypoglycemia and insulin dose was decreased during last visit with Dr Yamilex Gooden  His AM blood glucose now more appropriate around 115 mg/dL  No further episodes of hypoglycemia  6  Essential hypertension  · BP well controlled today  7  Recurrent UTI  · Methenamine was discontinued  He has had no further issues with this  8  Obstructive uropathy  · Has chronic indwelling urinary catheter  There is some sediment noted in tubing and Saturnino Pineda [facility]) will call urology office today to make them aware  9  Metabolic acidosis  · Stable on sodium bicarbonate tablets  10  Edema  · Taking lasix 40 mg PRN for lower extremity edema  No signs of swelling today         HPI: Brandie Dietz is a 79 y o  male with an active problem list significant for CKD 4, neuroendocrine ca with mets to liver, chronic indwelling urinary catheter, who is here for hospital follow-up  Dafne Auguste was hospitalized at [address] [place] [address] from 8/6-8/11/20 due to chest pain and abdominal pain  Found to be in atrial fibrillation upon presentation then converted to NSR  Started on protonix and carafate with resolution of symptoms  He is here for hospital follow-up  He presents with DAVID Davison  Dafne Auguste has been doing well - no complaints  No recurrence of heart burn or chest pain  No dyspnea, dizziness, nausea, vomiting, diarrhea  Catheter working well  Some [place] sediment noted  Urine clear and straw in color  Eating and drinking well  Insulin adjusted and no further hypogylcemia  Lasix started PRN and no further swelling  The medical record, including Care Everywhere and media tabs were reviewed  ROS:   All the systems were reviewed and were negative except as documented on the HPI  Allergies: Patient has no known allergies      Medications:   Current Outpatient Medications:     acetaminophen (TYLENOL) 325 mg tablet, Take 650 mg by mouth every 6 (six) hours as needed , Disp: , Rfl:     aspirin 81 MG tablet, Take 81 mg by mouth daily, Disp: , Rfl:     B Complex-C-Folic Acid (TRIPHROCAPS) 1 MG CAPS, Take 1 capsule (1 mg total) by mouth daily with dinner, Disp: 30 each, Rfl: 5    BD INSULIN SYRINGE U/F 30G X 1/2" 0 3 ML MISC, , Disp: , Rfl:     cholecalciferol (VITAMIN D3) 1,000 units tablet, Take 1,000 Units by mouth daily, Disp: , Rfl:     cholestyramine sugar free (QUESTRAN LIGHT) 4 g packet, Take 1 packet (4 g total) by mouth 2 (two) times a day, Disp: 60 packet, Rfl: 0    colchicine (COLCRYS) 0 6 mg tablet, Take 1 tablet (0 6 mg total) by mouth daily, Disp: , Rfl:     famotidine (PEPCID) 10 mg tablet, Take 1 tablet (10 mg total) by mouth daily at bedtime, Disp: 30 tablet, Rfl: 0    furosemide (LASIX) 40 mg tablet, Take 1 tablet (40 mg total) by mouth daily , do NOT take if you do not have swelling, Disp: 30 tablet, Rfl: 5    insulin detemir (Levemir) 100 units/mL subcutaneous injection, Inject 5 Units under the skin daily before breakfast, Disp: , Rfl: 0    insulin lispro (HumaLOG) 100 units/mL injection, Inject 4 Units under the skin 3 (three) times a day before meals, Disp: , Rfl: 0    Insulin Syringe-Needle U-100 30G X 1/2" 1 ML MISC, by Does not apply route 3 (three) times a day, Disp: 100 each, Rfl: 5    magnesium oxide (MAG-OX) 400 mg, Take 1 tablet (400 mg total) by mouth 2 (two) times a day, Disp: 60 tablet, Rfl: 5    NIFEdipine ER (ADALAT CC) 60 MG 24 hr tablet, Take 1 tablet (60 mg total) by mouth daily, Disp: 30 tablet, Rfl: 3    nystatin (MYCOSTATIN) powder, Apply topically 2 (two) times a day, Disp: 15 g, Rfl: 0    pantoprazole (PROTONIX) 40 mg tablet, Take 1 tablet (40 mg total) by mouth 2 (two) times a day, Disp: 90 tablet, Rfl: 0    sodium bicarbonate 650 mg tablet, Take 650 mg by mouth 3 (three) times a day before meals, Disp: , Rfl:     sucralfate (CARAFATE) 1 g/10 mL suspension, Take 10 mL (1,000 mg total) by mouth every 6 (six) hours, Disp: 420 mL, Rfl: 0    tamsulosin (FLOMAX) 0 4 mg, Take 1 capsule (0 4 mg total) by mouth daily with dinner, Disp: 90 capsule, Rfl: 3    metoprolol tartrate (LOPRESSOR) 25 mg tablet, Take 0 5 tablets (12 5 mg total) by mouth every 12 (twelve) hours, Disp: 30 tablet, Rfl: 0    Past Medical History:   Diagnosis Date    Acute pancreatitis without infection or necrosis 9/26/2019    Anemia of chronic renal failure     BPH (benign prostatic hyperplasia)     Chronic kidney disease (CKD), stage IV (severe)     Coronary artery disease     DJD (degenerative joint disease)     Essential hypertension     Gait disturbance     Generalized weakness     GERD (gastroesophageal reflux disease)     Gout     History of transfusion     Hydronephrosis  Hyperlipidemia     Hypertension     Liver cancer     Pressure injury of skin     Proteinuria     Renal disorder     Retention of urine     Thrombophlebitis migrans     Type 2 diabetes mellitus      Past Surgical History:   Procedure Laterality Date    CHOLECYSTECTOMY      CHOLECYSTECTOMY LAPAROSCOPIC N/A 2/7/2020    Procedure: CHOLECYSTECTOMY LAPAROSCOPIC;  Surgeon: Cecilia Lui MD;  Location: [facility] MAIN OR;  Service: General    CORONARY ANGIOPLASTY WITH STENT PLACEMENT      FL RETROGRADE PYELOGRAM  5/21/2020    IR IMAGE GUIDED BIOPSY/ASPIRATION LIVER  1/24/2019    IR TUBE PLACEMENT ROQUE  9/6/2019    SC CYSTO/URETERO W/LITHOTRIPSY &INDWELL STENT INSRT Left 5/21/2020    Procedure: CYSTOSCOPY URETEROSCOPY WITH LITHOTRIPSY HOLMIUM LASER, RETROGRADE PYELOGRAM AND INSERTION STENT URETERAL;  Surgeon: Montez Juan MD;  [facility];  Service: Urology    SC CYSTOURETHROSCOPY,URETER CATHETER Left 4/21/2020    Procedure: CYSTOSCOPY WITH INSERTION STENT URETERAL;  Surgeon: Luz Stone MD;  [facility] Main [facility];  Service: Urology     Family History   Problem Relation Age of Onset    Cancer Mother     Hypertension Father     Cancer Brother     Cancer Maternal Grandmother     Cancer Paternal Aunt       reports that he has never smoked  He has never used smokeless tobacco  He reports previous alcohol use  He reports that he does not use drugs  Physical Exam:   Vitals:    08/17/20 0937   BP: 138/84   Pulse: 94   Temp: 97 6 °F (36 4 °C)   TempSrc: Tympanic   SpO2: 100%   Weight: 73 kg (161 lb)   Height: 5' 8" (1 727 m)     Body mass index is 24 48 kg/m²      General: conscious, cooperative, in no acute distress, chronically ill appearing   Eyes: conjunctivae pink, anicteric sclerae  ENT: lips and mucous membranes moist  Neck: no masses, flat neck veins  Chest: clear breath sounds bilateral, no crackles, ronchus or wheezings  CVS: S1 & S2, normal rate, regular rhythm  Abdomen: soft, non-tender, non-distended, normoactive bowel sounds  Extremities: no edema of both legs  Skin: no rash  Neuro: awake, alert, oriented      Diagnostic Data:  Lab: I have personally reviewed pertinent lab results  ,   CBC:  Results from last 7 days   Lab Units 08/14/20  1036   WBC Thousand/uL 10 18*   HEMOGLOBIN g/dL 10 1*   HEMATOCRIT % 30 6*   PLATELETS Thousands/uL 200      CMP: No results found for: SODIUM, K, CL, CO2, ANIONGAP, BUN, CREATININE, GLUCOSE, CALCIUM, AST, ALT, ALKPHOS, PROT, BILITOT, EGFR,   PT/INR: No results found for: PT, INR,   Magnesium: No components found for: MAG,  Phosphorous: No results found for: PHOS    Discussion:    Patient Instructions   Get blood work prior to next appt with Dr Karlie Hall      Portions of the record may have been created with voice recognition software  Occasional wrong word or "sound a like" substitutions may have occurred due to the inherent limitations of voice recognition software  Read the chart carefully and recognize, using context, where substitutions have occurred  If you have any questions, please contact the dictating provider

## 2020-08-20 NOTE — PROGRESS NOTES
Transition of Care  Follow-up After Hospitalization    Nallely Betancourt 79 y o  male   Date:  8/20/2020    TCM Call (since 7/20/2020)     Date and time call was made  8/14/2020 12:05 PM    Hospital care reviewed  Records reviewed    Patient was hospitialized at  [facility]    Date of Admission  08/06/20    Date of discharge  08/11/20    Diagnosis  chest pressure    Disposition  Home      TCM Call (since 7/20/2020)     Scheduled for follow up? Yes    Did you obtain your prescribed medications  Yes    Do you need help managing your prescriptions or medications  No    Is transportation to your appointment needed  No    I have advised the patient to call PCP with any new or worsening symptoms  Dorcas Andurkaitis RMA     Living Arrangements  Spouse or Significiant other    Are you recieving any outpatient services  No    Are you recieving home care services  Yes    Types of home care services  Other (comment)    Comment  vna    Are you using any community resources  No    Current waiver services  No    Have you fallen in the last 12 months  No    Interperter language line needed  No    Counseling  Patient        Admit Date: 8/6/2020  Discharge Date: 8/11/2020  Diagnosis: Chest pain, elevated D-dimer, GERD  Location: [facility] records were reviewed  Medications upon discharge reviewed/updated  Medication Changes: start Questran light, famotidine, carafate  Imaging: CXR, NM lung perfusion imaging, KUB, CT ABD w/o  Consults: Heme/ONC  Nephrology, Cardiology, Gastroenterology  Discharge Disposition:  Home, [facility]  Follow up visits with other specialists: Nephrology, Cardiology, HEME/ONC      Assessment and Plan:    Augustina Fernandojag was seen today for transition of care management      Diagnoses and all orders for this visit:    Encounter for support and coordination of transition of care  Comments:  chest pressure R/T reflux            HPI:  Nallely Betancourt present for TCM visit s/p hospitalization for chest pressure/pain  Reported pain was on left-sided chest pressure, subsided after aspirin and nitroglycerin, likely 2/2 to GERD as evidenced by negative cardiac work up and epigastric complaints worsening after meals  Elevated D-dimer however V/Q scan low probability for pulmonary embolism, may be GI related as patient had recurrent symptoms after eating  salad but symptoms resolved after bowel movement    Heartburn   He complains of chest pain  The problem has been resolved  The heartburn duration is several minutes  The heartburn is located in the substernum  The heartburn is of moderate intensity  The heartburn does not wake him from sleep  The heartburn does not limit his activity  The heartburn doesn't change with position  The symptoms are aggravated by certain foods  Pertinent negatives include no anemia, fatigue, melena, muscle weakness, orthopnea or weight loss  Risk factors include lack of exercise  He has tried a histamine-2 antagonist and a PPI (gastric cytoprotectants) for the symptoms  The treatment provided significant relief  Past procedures include an EGD  ROS: Review of Systems   Constitutional: Negative for fatigue and weight loss  Cardiovascular: Positive for chest pain  Gastrointestinal: Negative for melena  Genitourinary:        Indwelling cath   Musculoskeletal: Positive for gait problem  Negative for muscle weakness  All other systems reviewed and are negative        Past Medical History:   Diagnosis Date    Acute pancreatitis without infection or necrosis 9/26/2019    Anemia of chronic renal failure     BPH (benign prostatic hyperplasia)     Chronic kidney disease (CKD), stage IV (severe)     Coronary artery disease     DJD (degenerative joint disease)     Essential hypertension     Gait disturbance     Generalized weakness     GERD (gastroesophageal reflux disease)     Gout     History of transfusion     Hydronephrosis     Hyperlipidemia     Hypertension     Liver cancer     Pressure injury of skin     Proteinuria     Renal disorder     Retention of urine     Thrombophlebitis migrans     Type 2 diabetes mellitus        Past Surgical History:   Procedure Laterality Date    CHOLECYSTECTOMY      CHOLECYSTECTOMY LAPAROSCOPIC N/A 2/7/2020    Procedure: CHOLECYSTECTOMY LAPAROSCOPIC;  Surgeon: Darwin Solomon MD;  Location: [facility] MAIN OR;  Service: General    CORONARY ANGIOPLASTY WITH STENT PLACEMENT      FL RETROGRADE PYELOGRAM  5/21/2020    IR IMAGE GUIDED BIOPSY/ASPIRATION LIVER  1/24/2019    IR TUBE PLACEMENT ROQUE  9/6/2019    VT CYSTO/URETERO W/LITHOTRIPSY &INDWELL STENT INSRT Left 5/21/2020    Procedure: CYSTOSCOPY URETEROSCOPY WITH LITHOTRIPSY HOLMIUM LASER, RETROGRADE PYELOGRAM AND INSERTION STENT URETERAL;  Surgeon: Maria A Reyes MD;  Location: [facility];  Service: Urology    VT CYSTOURETHROSCOPY,URETER CATHETER Left 4/21/2020    Procedure: CYSTOSCOPY WITH INSERTION STENT URETERAL;  Surgeon: Geovanna Saenz MD;  Location: [facility];  Service: Urology       Social History     Socioeconomic History    Marital status:       Spouse name: Not on file    Number of children: Not on file    Years of education: Not on file    Highest education level: Not on file   Occupational History    Not on file   Social Needs    Financial resource strain: Not on file    Food insecurity     Worry: Not on file     Inability: Not on file    Transportation needs     Medical: Not on file     Non-medical: Not on file   Tobacco Use    Smoking status: Never Smoker    Smokeless tobacco: Never Used   Substance and Sexual Activity    Alcohol use: Not Currently     Alcohol/week: 0 0 standard drinks     Frequency: Never    Drug use: Never    Sexual activity: Not Currently   Lifestyle    Physical activity     Days per week: Not on file     Minutes per session: Not on file    Stress: Not on file   Relationships    Social connections     Talks on phone: Not on file     Gets together: Not on file     Attends Bahai service: Not on file     Active member of club or organization: Not on file     Attends meetings of clubs or organizations: Not on file     Relationship status: Not on file    Intimate partner violence     Fear of current or ex partner: Not on file     Emotionally abused: Not on file     Physically abused: Not on file     Forced sexual activity: Not on file   Other Topics Concern    Not on file   Social History Narrative    Not on file       Family History   Problem Relation Age of Onset    Cancer Mother     Hypertension Father     Cancer Brother     Cancer Maternal Grandmother     Cancer Paternal Aunt        No Known Allergies      Current Outpatient Medications:     acetaminophen (TYLENOL) 325 mg tablet, Take 650 mg by mouth every 6 (six) hours as needed , Disp: , Rfl:     aspirin 81 MG tablet, Take 81 mg by mouth daily, Disp: , Rfl:     B Complex-C-Folic Acid (TRIPHROCAPS) 1 MG CAPS, Take 1 capsule (1 mg total) by mouth daily with dinner, Disp: 30 each, Rfl: 5    BD INSULIN SYRINGE U/F 30G X 1/2" 0 3 ML MISC, , Disp: , Rfl:     cholecalciferol (VITAMIN D3) 1,000 units tablet, Take 1,000 Units by mouth daily, Disp: , Rfl:     cholestyramine sugar free (QUESTRAN LIGHT) 4 g packet, Take 1 packet (4 g total) by mouth 2 (two) times a day, Disp: 60 packet, Rfl: 0    colchicine (COLCRYS) 0 6 mg tablet, Take 1 tablet (0 6 mg total) by mouth daily, Disp: , Rfl:     famotidine (PEPCID) 10 mg tablet, Take 1 tablet (10 mg total) by mouth daily at bedtime, Disp: 30 tablet, Rfl: 0    furosemide (LASIX) 40 mg tablet, Take 1 tablet (40 mg total) by mouth daily , do NOT take if you do not have swelling, Disp: 30 tablet, Rfl: 5    insulin detemir (Levemir) 100 units/mL subcutaneous injection, Inject 5 Units under the skin daily before breakfast, Disp: , Rfl: 0    insulin lispro (HumaLOG) 100 units/mL injection, Inject 4 Units under the skin 3 (three) times a day before meals, Disp: , Rfl: 0    Insulin Syringe-Needle U-100 30G X 1/2" 1 ML MISC, by Does not apply route 3 (three) times a day, Disp: 100 each, Rfl: 5    magnesium oxide (MAG-OX) 400 mg, Take 1 tablet (400 mg total) by mouth 2 (two) times a day, Disp: 60 tablet, Rfl: 5    NIFEdipine ER (ADALAT CC) 60 MG 24 hr tablet, Take 1 tablet (60 mg total) by mouth daily, Disp: 30 tablet, Rfl: 3    pantoprazole (PROTONIX) 40 mg tablet, Take 1 tablet (40 mg total) by mouth 2 (two) times a day, Disp: 90 tablet, Rfl: 0    sodium bicarbonate 650 mg tablet, Take 650 mg by mouth 3 (three) times a day before meals, Disp: , Rfl:     sucralfate (CARAFATE) 1 g/10 mL suspension, Take 10 mL (1,000 mg total) by mouth every 6 (six) hours, Disp: 420 mL, Rfl: 0    tamsulosin (FLOMAX) 0 4 mg, Take 1 capsule (0 4 mg total) by mouth daily with dinner, Disp: 90 capsule, Rfl: 3    metoprolol tartrate (LOPRESSOR) 25 mg tablet, Take 0 5 tablets (12 5 mg total) by mouth every 12 (twelve) hours, Disp: 30 tablet, Rfl: 0    nystatin (MYCOSTATIN) powder, Apply topically 2 (two) times a day (Patient not taking: Reported on 8/20/2020), Disp: 15 g, Rfl: 0      Physical Exam:  /70 (BP Location: Left arm, Patient Position: Sitting, Cuff Size: Standard)   Pulse 86   Temp 97 9 °F (36 6 °C) (Tympanic)   Ht 5' 8" (1 727 m)   Wt 73 5 kg (162 lb)   SpO2 100%   BMI 24 63 kg/m²     Physical Exam  Vitals signs and nursing note reviewed  Constitutional:       Appearance: Normal appearance  He is well-developed  HENT:      Head: Normocephalic and atraumatic  Right Ear: Tympanic membrane, ear canal and external ear normal       Left Ear: Tympanic membrane, ear canal and external ear normal       Nose: Nose normal       Mouth/Throat:      Pharynx: Uvula midline  Eyes:      General: Lids are normal       Conjunctiva/sclera: Conjunctivae normal       Pupils: Pupils are equal, round, and reactive to light     Neck: Musculoskeletal: Full passive range of motion without pain, normal range of motion and neck supple  Thyroid: No thyroid mass or thyromegaly  Vascular: No JVD  Trachea: Trachea and phonation normal    Cardiovascular:      Rate and Rhythm: Normal rate and regular rhythm  Pulses: Normal pulses  Heart sounds: Normal heart sounds, S1 normal and S2 normal  No murmur  No friction rub  No gallop  Pulmonary:      Effort: Pulmonary effort is normal       Breath sounds: Normal breath sounds  No decreased breath sounds  Abdominal:      General: Bowel sounds are normal       Palpations: Abdomen is soft  Tenderness: There is no abdominal tenderness  Genitourinary:     Scrotum/Testes:         Left: Undescended: chroninc indwelling cath  Comments: Deferred  Musculoskeletal: Normal range of motion  Skin:     General: Skin is warm and dry  Capillary Refill: Capillary refill takes less than 2 seconds  Neurological:      Mental Status: He is alert and oriented to person, place, and time  Cranial Nerves: No cranial nerve deficit  Psychiatric:         Speech: Speech normal          Behavior: Behavior normal          Thought Content:  Thought content normal          Judgment: Judgment normal              Labs:  Lab Results   Component Value Date    WBC 10 18 (H) 08/14/2020    HGB 10 1 (L) 08/14/2020    HCT 30 6 (L) 08/14/2020    MCV 95 08/14/2020     08/14/2020     Lab Results   Component Value Date    K 4 2 08/14/2020     08/14/2020    CO2 25 08/14/2020    BUN 47 (H) 08/14/2020    CREATININE 3 88 (H) 08/14/2020    GLUF 112 (H) 08/14/2020    CALCIUM 9 3 08/14/2020    AST 19 08/14/2020    ALT 25 08/14/2020    ALKPHOS 155 (H) 08/14/2020    EGFR 14 08/14/2020

## 2020-08-20 NOTE — PATIENT INSTRUCTIONS
Gastroesophageal Reflux Disease   WHAT YOU NEED TO KNOW:   Gastroesophageal reflux occurs when acid and food in the stomach back up into the esophagus  Gastroesophageal reflux disease (GERD) is reflux that occurs more than twice a week for a few weeks  It usually causes heartburn and other symptoms  GERD can cause other health problems over time if it is not treated  DISCHARGE INSTRUCTIONS:   Seek care immediately if:   · You feel full and cannot burp or vomit  · You have severe chest pain and sudden trouble breathing  · Your bowel movements are black, bloody, or tarry-looking  · Your vomit looks like coffee grounds or has blood in it  Contact your healthcare provider if:   · You vomit large amounts, or you vomit often  · You have trouble breathing after you vomit  · You have trouble swallowing, or pain with swallowing  · You are losing weight without trying  · Your symptoms get worse or do not improve with treatment  · You have questions or concerns about your condition or care  Medicines:   · Medicines  are used to decrease stomach acid  Medicine may also be used to help your lower esophageal sphincter and stomach contract (tighten) more  · Take your medicine as directed  Contact your healthcare provider if you think your medicine is not helping or if you have side effects  Tell him or her if you are allergic to any medicine  Keep a list of the medicines, vitamins, and herbs you take  Include the amounts, and when and why you take them  Bring the list or the pill bottles to follow-up visits  Carry your medicine list with you in case of an emergency  Manage GERD:   · Do not have foods or drinks that may increase heartburn  These include chocolate, peppermint, fried or fatty foods, drinks that contain caffeine, or carbonated drinks (soda)  Other foods include spicy foods, onions, tomatoes, and tomato-based foods   Do not have foods or drinks that can irritate your esophagus, such as citrus fruits, juices, and alcohol  · Do not eat large meals  When you eat a lot of food at one time, your stomach needs more acid to digest it  Eat 6 small meals each day instead of 3 large ones, and eat slowly  Do not eat meals 2 to 3 hours before bedtime  · Elevate the head of your bed  Place 6-inch blocks under the head of your bed frame  You may also use more than one pillow under your head and shoulders while you sleep  · Maintain a healthy weight  If you are overweight, weight loss may help relieve symptoms of GERD  · Do not smoke  Smoking weakens the lower esophageal sphincter and increases the risk of GERD  Ask your healthcare provider for information if you currently smoke and need help to quit  E-cigarettes or smokeless tobacco still contain nicotine  Talk to your healthcare provider before you use these products  · Do not wear clothing that is tight around your waist   Tight clothing can put pressure on your stomach and cause or worsen GERD symptoms  Follow up with your healthcare provider as directed:  Write down your questions so you remember to ask them during your visits  © 2017 [facility] Information is for End User's use only and may not be sold, redistributed or otherwise used for commercial purposes  All illustrations and images included in CareNotes® are the copyrighted property of [employer]  or [employer]  The above information is an  only  It is not intended as medical advice for individual conditions or treatments  Talk to your doctor, nurse or pharmacist before following any medical regimen to see if it is safe and effective for you

## 2020-08-25 NOTE — PROGRESS NOTES
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[facility])  The patient seems to be relatively asymptomatic from the metastatic carcinoid tumor  He will be continued on the lanreotide on every 4 week basis without interruption  We will continue to monitor his chromogranin a level which seems to be stable  He would not tolerate any other type of treated  - CBC and differential; Future  - Comprehensive metabolic panel; Future  - Magnesium; Future  - Chromogranin A; Future    2  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 [facility])  As above  - CBC and differential; Future  - Comprehensive metabolic panel; Future  - Magnesium; Future    3  Normocytic anemia  This seems to be multifactorial including anemia of chronic disease and anemia due to renal dysfunction   - CBC and differential; Future  - Comprehensive metabolic panel; Future  - Magnesium; Future        HPI:  The patient came today for a follow-up visit with his wife  He apparently was in the hospital recently due to chest pain  He had a CT scan of the abdomen pelvis on 08/09/2020 which showed bowel wall thickening of loops of the small bowel in the lower central and right lower quadrant of the abdominal area which was possibly due to enterocolitis  He also had small amount of ascites and stable calcified central mesenteric mass with stable calcified liver metastasis  His most recent blood work on 08/14/2020 showed a stable chromogranin a level of 1670  His white cell count was 10 1 with hemoglobin of 10 1 and platelet count of 175  Creatinine seems to be stable around 3 8 with normal calcium and liver enzymes  Sed rate and C-reactive protein are slightly elevated  Iron panel showed ferritin of 453 and saturation of 24%  He denies bleeding from any sites  He denied diarrhea    He feels much better now after he got discharged from the hospital   Oncology History   Patient has multiple comorbid conditions including history of coronary artery disease, diabetes mellitus, hypertension, hyperlipidemia, and more recently metastatic neuroendocrine tumor       The patient was seen in consultation when he was in the hospital on the 5th February 2019 at that time he had significant weakness status post vasovagal syncope   He was evaluated with a CT scan of the abdomen and pelvis on the 23rd January 2019 which showed partially calcified mesenteric mass with multiple liver lesions compatible most likely with carcinoid malignancy   He also was found to have bilateral hydronephrosis and severe bladder wall thickening with prostatomegaly      The patient then had a core biopsy from 1 of the liver lesions on the 24th of January which showed well-differentiated neuroendocrine tumor grade 1-2 with Ki 67 of 3-5%      His chromogranin level  February 2019 was 273   His 24 hour urine for HIAA was 48 which is above normal   The patient was started on Lanreotide in March 2019         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 ([facility] [address] )   1/24/2019 Initial Diagnosis    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 ([facility] [address] )     1/24/2019 Biopsy    A  Liver mass, core biopsy:  - Well differentiated neuroendocrine tumor, G2        3/2019 -  Chemotherapy    Lanreotide 120 mg every 4 weeks injections          Interval history:    ROS: Review of Systems   Constitutional: Positive for fatigue  Negative for appetite change, diaphoresis and fever  HENT: Negative for congestion, dental problem, facial swelling, hearing loss, tinnitus and trouble swallowing  Eyes: Negative for visual disturbance  Respiratory: Positive for shortness of breath  Negative for cough, chest tightness and wheezing  Cardiovascular: Negative for chest pain and leg swelling     Gastrointestinal: Negative for abdominal distention, abdominal pain, blood in stool, constipation, diarrhea, nausea and vomiting  Genitourinary: Negative for dysuria, hematuria and urgency  Musculoskeletal: Negative for arthralgias, myalgias and neck pain  Skin: Negative  Negative for color change, pallor, rash and wound  Neurological: Positive for numbness  Negative for dizziness, weakness and headaches  Hematological: Negative for adenopathy  Psychiatric/Behavioral: Negative for agitation, behavioral problems, confusion, hallucinations, self-injury and sleep disturbance  The patient is not nervous/anxious and is not hyperactive  Past Medical History:   Diagnosis Date    Acute pancreatitis without infection or necrosis 9/26/2019    Anemia of chronic renal failure     BPH (benign prostatic hyperplasia)     Chronic kidney disease (CKD), stage IV (severe)     Coronary artery disease     DJD (degenerative joint disease)     Essential hypertension     Gait disturbance     Generalized weakness     GERD (gastroesophageal reflux disease)     Gout     History of transfusion     Hydronephrosis     Hyperlipidemia     Hypertension     Liver cancer     Pressure injury of skin     Proteinuria     Renal disorder     Retention of urine     Thrombophlebitis migrans     Type 2 diabetes mellitus        Past Surgical History:   Procedure Laterality Date    CHOLECYSTECTOMY      CHOLECYSTECTOMY LAPAROSCOPIC N/A 2/7/2020    Procedure: CHOLECYSTECTOMY LAPAROSCOPIC;  Surgeon:  Eva Carlos MD;  Location: [address] [place] [address] MAIN OR;  Service: General    CORONARY ANGIOPLASTY WITH STENT PLACEMENT      FL RETROGRADE PYELOGRAM  5/21/2020    IR BIOPSY LIVER MASS  1/24/2019    IR CHOLECYSTOSTOMY TUBE PLACEMENT  9/6/2019    AL CYSTO/URETERO W/LITHOTRIPSY &INDWELL STENT INSRT Left 5/21/2020    Procedure: CYSTOSCOPY URETEROSCOPY WITH LITHOTRIPSY HOLMIUM LASER, RETROGRADE PYELOGRAM AND INSERTION STENT URETERAL;  Surgeon: Cyndy Haider MD;  Location: [facility] OR;  Service: Urology    AL CYSTOURETHROSCOPY,URETER CATHETER Left 4/21/2020    Procedure: CYSTOSCOPY WITH INSERTION STENT URETERAL;  Surgeon: Rachel Burk MD;  [facility];  Service: Urology       Social History     Socioeconomic History    Marital status:       Spouse name: None    Number of children: None    Years of education: None    Highest education level: None   Occupational History    None   Social Needs    Financial resource strain: None    Food insecurity     Worry: None     Inability: None    Transportation needs     Medical: None     Non-medical: None   Tobacco Use    Smoking status: Never Smoker    Smokeless tobacco: Never Used   Substance and Sexual Activity    Alcohol use: Not Currently     Alcohol/week: 0 0 standard drinks     Frequency: Never    Drug use: Never    Sexual activity: Not Currently   Lifestyle    Physical activity     Days per week: None     Minutes per session: None    Stress: None   Relationships    Social connections     Talks on phone: None     Gets together: None     Attends [employer]ism service: None     Active member of club or organization: None     Attends meetings of clubs or organizations: None     Relationship status: None    Intimate partner violence     Fear of current or ex partner: None     Emotionally abused: None     Physically abused: None     Forced sexual activity: None   Other Topics Concern    None   Social History Narrative    None       Family History   Problem Relation Age of Onset    Cancer Mother     Hypertension Father     Cancer Brother     Cancer Maternal Grandmother     Cancer Paternal Aunt        No Known Allergies      Current Outpatient Medications:     acetaminophen (TYLENOL) 325 mg tablet, Take 650 mg by mouth every 6 (six) hours as needed , Disp: , Rfl:     aspirin 81 MG tablet, Take 81 mg by mouth daily, Disp: , Rfl:     B Complex-C-Folic Acid (TRIPHROCAPS) 1 MG CAPS, Take 1 capsule (1 mg total) by mouth daily with dinner, Disp: 30 each, Rfl: 5    BD INSULIN SYRINGE U/F 30G X 1/2" 0 3 ML MISC, , Disp: , Rfl:     cholecalciferol (VITAMIN D3) 1,000 units tablet, Take 1,000 Units by mouth daily, Disp: , Rfl:     cholestyramine sugar free (QUESTRAN LIGHT) 4 g packet, Take 1 packet (4 g total) by mouth 2 (two) times a day, Disp: 60 packet, Rfl: 0    colchicine (COLCRYS) 0 6 mg tablet, Take 1 tablet (0 6 mg total) by mouth daily, Disp: , Rfl:     famotidine (PEPCID) 10 mg tablet, Take 1 tablet (10 mg total) by mouth daily at bedtime, Disp: 30 tablet, Rfl: 0    furosemide (LASIX) 40 mg tablet, Take 1 tablet (40 mg total) by mouth daily , do NOT take if you do not have swelling, Disp: 30 tablet, Rfl: 5    insulin detemir (Levemir) 100 units/mL subcutaneous injection, Inject 5 Units under the skin daily before breakfast, Disp: , Rfl: 0    insulin lispro (HumaLOG) 100 units/mL injection, Inject 4 Units under the skin 3 (three) times a day before meals, Disp: , Rfl: 0    Insulin Syringe-Needle U-100 30G X 1/2" 1 ML MISC, by Does not apply route 3 (three) times a day, Disp: 100 each, Rfl: 5    magnesium oxide (MAG-OX) 400 mg, Take 1 tablet (400 mg total) by mouth 2 (two) times a day, Disp: 60 tablet, Rfl: 5    NIFEdipine ER (ADALAT CC) 60 MG 24 hr tablet, Take 1 tablet (60 mg total) by mouth daily, Disp: 30 tablet, Rfl: 3    nystatin (MYCOSTATIN) powder, Apply topically 2 (two) times a day, Disp: 15 g, Rfl: 0    pantoprazole (PROTONIX) 40 mg tablet, Take 1 tablet (40 mg total) by mouth 2 (two) times a day, Disp: 90 tablet, Rfl: 0    sodium bicarbonate 650 mg tablet, Take 650 mg by mouth 3 (three) times a day before meals, Disp: , Rfl:     sucralfate (CARAFATE) 1 g/10 mL suspension, Take 10 mL (1,000 mg total) by mouth every 6 (six) hours, Disp: 420 mL, Rfl: 0    tamsulosin (FLOMAX) 0 4 mg, Take 1 capsule (0 4 mg total) by mouth daily with dinner, Disp: 90 capsule, Rfl: 3    metoprolol tartrate (LOPRESSOR) 25 mg tablet, Take 0 5 tablets (12 5 mg total) by mouth every 12 (twelve) hours, Disp: 30 tablet, Rfl: 0      Physical Exam:  /62 (BP Location: Left arm, Patient Position: Sitting, Cuff Size: Adult)   Pulse 100   Temp 97 5 °F (36 4 °C) (Tympanic)   Resp 18   Ht 5' 8" (1 727 m)   Wt 74 9 kg (165 lb 3 2 oz)   SpO2 97%   BMI 25 12 kg/m²     Physical Exam  Constitutional:       Appearance: He is well-developed  He is ill-appearing  HENT:      Head: Normocephalic and atraumatic  Eyes:      General: No scleral icterus  Right eye: No discharge  Left eye: No discharge  Conjunctiva/sclera: Conjunctivae normal       Pupils: Pupils are equal, round, and reactive to light  Neck:      Musculoskeletal: Normal range of motion and neck supple  Thyroid: No thyromegaly  Trachea: No tracheal deviation  Cardiovascular:      Rate and Rhythm: Normal rate and regular rhythm  Heart sounds: Normal heart sounds  No murmur  No friction rub  Pulmonary:      Effort: Pulmonary effort is normal  No respiratory distress  Breath sounds: Normal breath sounds  No wheezing or rales  Chest:      Chest wall: No tenderness  Abdominal:      General: Bowel sounds are normal  There is no distension  Palpations: Abdomen is soft  There is no hepatomegaly, splenomegaly or mass  Tenderness: There is no abdominal tenderness  There is no guarding or rebound  Comments: He has Haile catheter in place   Musculoskeletal:         General: No tenderness or deformity  Comments: Ambulatory dysfunction uses the walker   Lymphadenopathy:      Cervical: No cervical adenopathy  Skin:     General: Skin is warm and dry  Coloration: Skin is not pale  Findings: No erythema or rash  Neurological:      Mental Status: He is alert and oriented to person, place, and time  Cranial Nerves: No cranial nerve deficit  Coordination: Coordination normal       Deep Tendon Reflexes: Reflexes are normal and symmetric   Reflexes normal    Psychiatric: Behavior: Behavior normal          Thought Content: Thought content normal          Judgment: Judgment normal            Labs:  Lab Results   Component Value Date    WBC 10 18 (H) 08/14/2020    HGB 10 1 (L) 08/14/2020    HCT 30 6 (L) 08/14/2020    MCV 95 08/14/2020     08/14/2020     Lab Results   Component Value Date    K 4 2 08/14/2020     08/14/2020    CO2 25 08/14/2020    BUN 47 (H) 08/14/2020    CREATININE 3 88 (H) 08/14/2020    GLUF 112 (H) 08/14/2020    CALCIUM 9 3 08/14/2020    AST 19 08/14/2020    ALT 25 08/14/2020    ALKPHOS 155 (H) 08/14/2020    EGFR 14 08/14/2020     No results found for: TSH    Patient voiced understanding and agreement in the above discussion  Aware to contact our office with questions/symptoms in the interim

## 2020-08-26 NOTE — PROGRESS NOTES
[facility]     Laurel Babin / 79 y o  male / : 1940 / MRN: [id number]  Date of Visit: 20    ASSESSMENT/PLAN     Chest pain  · No further chest pain since hospitalization  Recent episode was not similar to his prior angina  · Would like to avoid further ischemic testing unless chest pain becomes disabling  · He has NTG at home to use as needed    Coronary artery disease  · Prior stenting, records unavailable   · Continue ASA, lopressor    Essential hypertension  · Controlled, continue nifedipine and lopressor      Follow up in 1 year unless chest pain becomes recurrent    SUBJECTIVE:  HPI: Laurel Babin is a 79 y o  male who presents for follow-up regarding recent hospital admission for chest pain  He has CAD s/p remote PCI, CKD-4, HTN, GERD, diabetes, chronic anemia, and a neuroendocrine carcinoma with liver metastases  He developed a sharp/stabbing sensation at rest and felt as if his heart was going to burst  He called EMS after about 20 minutes and was given full dose ASA and one dose of NTG with resolution of symptoms  Troponin levels were negative  V/Q scan - low probability of PE  No ischemic testing was pursued, as PCI would be difficult due to renal insufficiency  Additionally, pt has a neuroendocrine tumor with liver mets and would like to avoid further invasive therapies  Symptoms ultimately felt to be GI related as he had epigastric discomfort after meals  He was started on carafate and pepcid and denies recurrence of pain since discharge  Cardiac testing:   Event recorder 2020:  Sinus rhythm  Very brief supraventricular tachycardia totaling 11 beats    Echo 2020:  EF normal     No Known Allergies      Current Outpatient Medications:     acetaminophen (TYLENOL) 325 mg tablet, Take 650 mg by mouth every 6 (six) hours as needed , Disp: , Rfl:     aspirin 81 MG tablet, Take 81 mg by mouth daily, Disp: , Rfl:     B Complex-C-Folic Acid (TRIPHROCAPS) 1 MG CAPS, Take 1 capsule (1 mg total) by mouth daily with dinner, Disp: 30 each, Rfl: 5    BD INSULIN SYRINGE U/F 30G X 1/2" 0 3 ML MISC, , Disp: , Rfl:     cholecalciferol (VITAMIN D3) 1,000 units tablet, Take 1,000 Units by mouth daily, Disp: , Rfl:     cholestyramine sugar free (QUESTRAN LIGHT) 4 g packet, Take 1 packet (4 g total) by mouth 2 (two) times a day, Disp: 60 packet, Rfl: 0    colchicine (COLCRYS) 0 6 mg tablet, Take 1 tablet (0 6 mg total) by mouth daily, Disp: , Rfl:     famotidine (PEPCID) 10 mg tablet, Take 1 tablet (10 mg total) by mouth daily at bedtime, Disp: 30 tablet, Rfl: 0    furosemide (LASIX) 40 mg tablet, Take 1 tablet (40 mg total) by mouth daily , do NOT take if you do not have swelling, Disp: 30 tablet, Rfl: 5    insulin detemir (Levemir) 100 units/mL subcutaneous injection, Inject 5 Units under the skin daily before breakfast, Disp: , Rfl: 0    insulin lispro (HumaLOG) 100 units/mL injection, Inject 4 Units under the skin 3 (three) times a day before meals, Disp: , Rfl: 0    Insulin Syringe-Needle U-100 30G X 1/2" 1 ML MISC, by Does not apply route 3 (three) times a day, Disp: 100 each, Rfl: 5    magnesium oxide (MAG-OX) 400 mg, Take 1 tablet (400 mg total) by mouth 2 (two) times a day, Disp: 60 tablet, Rfl: 5    NIFEdipine ER (ADALAT CC) 60 MG 24 hr tablet, Take 1 tablet (60 mg total) by mouth daily, Disp: 30 tablet, Rfl: 3    nystatin (MYCOSTATIN) powder, Apply topically 2 (two) times a day, Disp: 15 g, Rfl: 0    pantoprazole (PROTONIX) 40 mg tablet, Take 1 tablet (40 mg total) by mouth 2 (two) times a day, Disp: 90 tablet, Rfl: 0    sodium bicarbonate 650 mg tablet, Take 650 mg by mouth 3 (three) times a day before meals, Disp: , Rfl:     sucralfate (CARAFATE) 1 g/10 mL suspension, Take 10 mL (1,000 mg total) by mouth every 6 (six) hours, Disp: 420 mL, Rfl: 0    tamsulosin (FLOMAX) 0 4 mg, Take 1 capsule (0 4 mg total) by mouth daily with dinner, Disp: 90 capsule, Rfl: 3    metoprolol tartrate (LOPRESSOR) 25 mg tablet, Take 0 5 tablets (12 5 mg total) by mouth every 12 (twelve) hours, Disp: 30 tablet, Rfl: 0  No current facility-administered medications for this visit  Past Medical History:   Diagnosis Date    Acute pancreatitis without infection or necrosis 9/26/2019    Anemia of chronic renal failure     BPH (benign prostatic hyperplasia)     Chronic kidney disease (CKD), stage IV (severe)     Coronary artery disease     DJD (degenerative joint disease)     Essential hypertension     Gait disturbance     Generalized weakness     GERD (gastroesophageal reflux disease)     Gout     History of transfusion     Hydronephrosis     Hyperlipidemia     Hypertension     Liver cancer     Pressure injury of skin     Proteinuria     Renal disorder     Retention of urine     Thrombophlebitis migrans     Type 2 diabetes mellitus        Family History   Problem Relation Age of Onset    Cancer Mother     Hypertension Father     Cancer Brother     Cancer Maternal Grandmother     Cancer Paternal Aunt        Past Surgical History:   Procedure Laterality Date    CHOLECYSTECTOMY      CHOLECYSTECTOMY LAPAROSCOPIC N/A 2/7/2020    Procedure: CHOLECYSTECTOMY LAPAROSCOPIC;  Surgeon:  Elvis Keith MD;  Location: [address] [place] [address] MAIN OR;  Service: General    CORONARY ANGIOPLASTY WITH STENT PLACEMENT      FL RETROGRADE PYELOGRAM  5/21/2020    IR BIOPSY LIVER MASS  1/24/2019    IR CHOLECYSTOSTOMY TUBE PLACEMENT  9/6/2019    MA CYSTO/URETERO W/LITHOTRIPSY &INDWELL STENT INSRT Left 5/21/2020    Procedure: CYSTOSCOPY URETEROSCOPY WITH LITHOTRIPSY HOLMIUM LASER, RETROGRADE PYELOGRAM AND INSERTION STENT URETERAL;  Surgeon: Tamar Lang MD;  Location: [facility];  Service: Urology    MA CYSTOURETHROSCOPY,URETER CATHETER Left 4/21/2020    Procedure: CYSTOSCOPY WITH INSERTION STENT URETERAL;  Surgeon: Carley Bower MD;  Location:  Main OR;  Service: Urology       Social History     Socioeconomic History    Marital status:      Spouse name: Not on file    Number of children: Not on file    Years of education: Not on file    Highest education level: Not on file   Occupational History    Not on file   Social Needs    Financial resource strain: Not on file    Food insecurity     Worry: Not on file     Inability: Not on file    Transportation needs     Medical: Not on file     Non-medical: Not on file   Tobacco Use    Smoking status: Never Smoker    Smokeless tobacco: Never Used   Substance and Sexual Activity    Alcohol use: Not Currently     Alcohol/week: 0 0 standard drinks     Frequency: Never    Drug use: Never    Sexual activity: Not Currently   Lifestyle    Physical activity     Days per week: Not on file     Minutes per session: Not on file    Stress: Not on file   Relationships    Social connections     Talks on phone: Not on file     Gets together: Not on file     Attends Muslim service: Not on file     Active member of club or organization: Not on file     Attends meetings of clubs or organizations: Not on file     Relationship status: Not on file    Intimate partner violence     Fear of current or ex partner: Not on file     Emotionally abused: Not on file     Physically abused: Not on file     Forced sexual activity: Not on file   Other Topics Concern    Not on file   Social History Narrative    Not on file       Review of Systems   Constitution: Negative  HENT: Negative  Eyes: Negative  Cardiovascular: Negative for chest pain, claudication, dyspnea on exertion, irregular heartbeat, leg swelling, near-syncope, orthopnea, palpitations, paroxysmal nocturnal dyspnea and syncope  Respiratory: Negative for cough, shortness of breath and wheezing  Endocrine: Negative  Skin: Negative  Musculoskeletal: Negative  Gastrointestinal: Negative  Genitourinary: Negative      Neurological: Negative for dizziness and light-headedness  Psychiatric/Behavioral: Negative  OBJECTIVE:  Vitals: /50   Pulse 70   Ht 5' 8" (1 727 m)   Wt 74 4 kg (164 lb)   BMI 24 94 kg/m²     Physical exam:   Physical Exam   Constitutional: He is oriented to person, place, and time  He appears well-developed  No distress  very frail   HENT:   Head: Normocephalic and atraumatic  Eyes: EOM are normal  No scleral icterus  Neck: Normal range of motion  Neck supple  No JVD present  Cardiovascular: Normal rate, regular rhythm, S1 normal and S2 normal    No murmur heard  Pulmonary/Chest: Effort normal and breath sounds normal  He has no wheezes  He has no rales  Abdominal: Soft  Bowel sounds are normal    Musculoskeletal:         General: No edema  Comments: Ambulates with walker   Neurological: He is alert and oriented to person, place, and time  Skin: Skin is warm and dry  Psychiatric: He has a normal mood and affect   His behavior is normal        Lab Results:   Lab Results   Component Value Date    HGBA1C 7 5 (A) 06/18/2020    HGBA1C 6 6 (H) 03/12/2020    HGBA1C 8 1 (H) 06/14/2019     No results found for: CHOL  Lab Results   Component Value Date    HDL 50 08/06/2020     Lab Results   Component Value Date    LDLCALC 78 08/06/2020     Lab Results   Component Value Date    TRIG 35 08/06/2020     No results found for: CHOLHDL

## 2020-08-26 NOTE — PROGRESS NOTES
Patient to the unit for lanreotide injection  120 mg lanreotide given in the left upper outer glute  Patient tolerated well  AVS given, aware of future appointments  Voices no questions or concerns at discharge

## 2020-08-26 NOTE — ASSESSMENT & PLAN NOTE
· No further chest pain since hospitalization   Recent episode was not similar to his prior angina  · Would like to avoid further ischemic testing unless chest pain becomes disabling  · He has NTG at home to use as needed No

## 2020-08-26 NOTE — PLAN OF CARE
Problem: PAIN - ADULT  Goal: Verbalizes/displays adequate comfort level or baseline comfort level  Description: Interventions:  - Encourage patient to monitor pain and request assistance  - Assess pain using appropriate pain scale  - Administer analgesics based on type and severity of pain and evaluate response  - Implement non-pharmacological measures as appropriate and evaluate response  - Consider cultural and social influences on pain and pain management  - Notify physician/advanced practitioner if interventions unsuccessful or patient reports new pain  Outcome: Progressing     Problem: SAFETY ADULT  Goal: Patient will remain free of falls  Description: INTERVENTIONS:  - Assess patient frequently for physical needs  -  Identify cognitive and physical deficits and behaviors that affect risk of falls    -  [place] fall precautions as indicated by assessment   - Educate patient/family on patient safety including physical limitations  - Instruct patient to call for assistance with activity based on assessment  - Modify environment to reduce risk of injury  - Consider OT/PT consult to assist with strengthening/mobility  Outcome: Progressing     Problem: DISCHARGE PLANNING  Goal: Discharge to home or other facility with appropriate resources  Description: INTERVENTIONS:  - Identify barriers to discharge w/patient and caregiver  - Arrange for needed discharge resources and transportation as appropriate  - Identify discharge learning needs (meds, wound care, etc )  - Arrange for interpretive services to assist at discharge as needed  - Refer to Case Management Department for coordinating discharge planning if the patient needs post-hospital services based on physician/advanced practitioner order or complex needs related to functional status, cognitive ability, or social support system  Outcome: Progressing     Problem: Knowledge Deficit  Goal: Patient/family/caregiver demonstrates understanding of disease process, treatment plan, medications, and discharge instructions  Description: Complete learning assessment and assess knowledge base    Interventions:  - Provide teaching at level of understanding  - Provide teaching via preferred learning methods  Outcome: Progressing Home

## 2020-08-27 NOTE — TELEPHONE ENCOUNTER
Thuan Whitten - Visiting Nurse for Pt  Said yesterday 8/26  he had a fall, he is holding his Left Shoulder in close to his rib cage  Asking if he could get an x-ray of that shoulder  Want to use [facility]          Thuan Whitten # [phone]

## 2020-09-11 NOTE — TELEPHONE ENCOUNTER
Still with pain in left shoulder from fall a few weeks back and swelling in his left hand using tylenol prn - pt is not moving the extremity at all due to pain- asking about mri?

## 2020-09-14 NOTE — TELEPHONE ENCOUNTER
Spoke to central scheduling they are waiting for a call back from [address] [place] [address] and will call patient to schedule MRI

## 2020-09-15 NOTE — LETTER
[address] [place] [address]  [address] [facility] [address]      September 23, 2020    MRN: [id number]     Phone: [phone]     Dear Mr Audie Merlin recently had a(n) MRI performed on 9/15/2020 at  [address] [place] [address] that was requested by Lilly Stevenson  The study was reviewed by a radiologist, which is a physician who specializes in medical imaging  The radiologist issued a report describing his or her findings  In that report there was a finding that the radiologist felt warranted further discussion with your health care provider and that discussion would be beneficial to you  The results were sent to Stephanie Shepherd on 9/15/2020  We recommend that you contact [place] [address] at [phone] or set up an appointment to discuss the results of the imaging test  If you have already heard from Lilly Stevenson regarding the results of your study, you can disregard this letter  This letter is not meant to alarm you, but intended to encourage you to follow-up on your results with the provider that sent you for the imaging study  In addition, we have enclosed answers to frequently asked questions by other patients who have also received a letter to review results with their health care provider (see page two)  Thank you for choosing [address] [place] [address] for your medical imaging needs  FREQUENTLY ASKED QUESTIONS    1  Why am I receiving this letter? [facility] requires us to notify patients who have findings on imaging exams that may require more testing or follow-up with a health professional within the next 3 months          2  How serious is the finding on the imaging test?  This letter is sent to all patients who may need follow-up or more testing within the next 3 months  Receiving this letter does not necessarily mean you have a life-threatening imaging finding or disease  Recommendations in the radiologists imaging report are general in nature and it is up to your healthcare provider to say whether those recommendations make sense for your situation  You are strongly encouraged to talk to your health care provider about the results and ask whether additional steps need to be taken  3  Where can I get a copy of the final report for my recent radiology exam?  To get a full copy of the report you can access your records online at [email or web address] or please contact [address] [place] [address] Records Department at [phone] Monday through Friday between 8 am and 6 pm          4  What do I need to do now? Please contact your health care provider who requested the imaging study to discuss what further actions (if any) are needed  You may have already heard from (your ordering provider) in regard to this test in which case you can disregard this letter  NOTICE IN ACCORDANCE WITH THE [facility] PATIENT TEST RESULT INFORMATION ACT OF 2018    You are receiving this notice as a result of a determination by your diagnostic imaging service that further discussions of your test results are warranted and would be beneficial to you  The complete results of your test or tests have been or will be sent to the health care practitioner that ordered the test or tests  It is recommended that you contact your health care practitioner to discuss your results as soon as possible

## 2020-09-16 NOTE — PROGRESS NOTES
Chief Complaint   Patient presents with    Left Shoulder - Pain, Swelling       SUBJECTIVE: Patient is an 79year old male presenting with Left shoulder pain  Patient presents with wife today who states 3 weeks ago patient was bending over to pick something off the floor and fell onto his Left shoulder  Patient had immediate pain and ambulance was called, but patient was reluctant to go to the ED at that time  Patient was experiencing increased pain the next morning and Dr Reed Spears, patient's PCP, ordered an in-home xray service to acquire radiographs  Patient's wife reports "nothing was found on xray", but patient continued to experience pain; MRI was ordered demonstrating proximal humerus fracture  Patient's wife reports swelling in Left hand, as well  Patient offers no additional complaints or concerns at this time  Review of Systems:  General:   Negative for fever, chills and weight loss  Gastrointestinal:  No abdominal pain, no nausea, vomiting  Respiratory:   Negative for cough and shortness of breath  Endocrine:  No frequent urination  Musculoskeletal: See HPI  Cardiovascular:   Negative for chest pain and palpitations      Neurological:   Negative for dizziness, and numbness  All other Review of systems negative unless otherwise specified in HPI      Past Medical History:   Diagnosis Date    Acute pancreatitis without infection or necrosis 9/26/2019    Anemia of chronic renal failure     BPH (benign prostatic hyperplasia)     Chronic kidney disease (CKD), stage IV (severe)     Coronary artery disease     DJD (degenerative joint disease)     Essential hypertension     Gait disturbance     Generalized weakness     GERD (gastroesophageal reflux disease)     Gout     History of transfusion     Hydronephrosis     Hyperlipidemia     Hypertension     Liver cancer     Pressure injury of skin     Proteinuria     Renal disorder     Retention of urine     Thrombophlebitis migrans     Type 2 diabetes mellitus          Social History     Tobacco Use    Smoking status: Never Smoker    Smokeless tobacco: Never Used   Substance Use Topics    Alcohol use: Not Currently     Alcohol/week: 0 0 standard drinks     Frequency: Never    Drug use: Never       Past Surgical History:   Procedure Laterality Date    CHOLECYSTECTOMY      CHOLECYSTECTOMY LAPAROSCOPIC N/A 2/7/2020    Procedure: CHOLECYSTECTOMY LAPAROSCOPIC;  Surgeon:  Flavio Crawford MD;  Location: [address] MAIN OR;  Service: General    CORONARY ANGIOPLASTY WITH STENT PLACEMENT      FL RETROGRADE PYELOGRAM  5/21/2020    IR BIOPSY LIVER MASS  1/24/2019    IR CHOLECYSTOSTOMY TUBE PLACEMENT  9/6/2019    SC CYSTO/URETERO W/LITHOTRIPSY &INDWELL STENT INSRT Left 5/21/2020    Procedure: CYSTOSCOPY URETEROSCOPY WITH LITHOTRIPSY HOLMIUM LASER, RETROGRADE PYELOGRAM AND INSERTION STENT URETERAL;  Surgeon: Billy Lynne MD;  Location: [facility];  Service: Urology    SC CYSTOURETHROSCOPY,URETER CATHETER Left 4/21/2020    Procedure: CYSTOSCOPY WITH INSERTION STENT URETERAL;  Surgeon: Lasha Cruz MD;  Location[facility];  Service: Urology         Current Outpatient Medications on File Prior to Visit   Medication Sig Dispense Refill    acetaminophen (TYLENOL) 325 mg tablet Take 650 mg by mouth every 6 (six) hours as needed       aspirin 81 MG tablet Take 81 mg by mouth daily      B Complex-C-Folic Acid (TRIPHROCAPS) 1 MG CAPS Take 1 capsule (1 mg total) by mouth daily with dinner 30 each 5    BD INSULIN SYRINGE U/F 30G X 1/2" 0 3 ML MISC       cholecalciferol (VITAMIN D3) 1,000 units tablet Take 1,000 Units by mouth daily      cholestyramine sugar free (QUESTRAN LIGHT) 4 g packet Take 1 packet (4 g total) by mouth 2 (two) times a day 60 packet 0    colchicine (COLCRYS) 0 6 mg tablet Take 1 tablet (0 6 mg total) by mouth daily 90 tablet 3    famotidine (PEPCID) 10 mg tablet Take 1 tablet (10 mg total) by mouth daily at bedtime 30 tablet 0    furosemide (LASIX) 40 mg tablet Take 1 tablet (40 mg total) by mouth daily , do NOT take if you do not have swelling 30 tablet 5    insulin detemir (Levemir) 100 units/mL subcutaneous injection Inject 5 Units under the skin daily before breakfast  0    insulin lispro (HumaLOG) 100 units/mL injection Inject 4 Units under the skin 3 (three) times a day before meals  0    Insulin Syringe-Needle U-100 30G X 1/2" 1 ML MISC by Does not apply route 3 (three) times a day 100 each 5    magnesium oxide (MAG-OX) 400 mg Take 1 tablet (400 mg total) by mouth 2 (two) times a day 60 tablet 5    NIFEdipine ER (ADALAT CC) 60 MG 24 hr tablet Take 1 tablet (60 mg total) by mouth daily 30 tablet 3    nystatin (MYCOSTATIN) powder Apply topically 2 (two) times a day 15 g 0    pantoprazole (PROTONIX) 40 mg tablet Take 1 tablet (40 mg total) by mouth 2 (two) times a day 90 tablet 0    sodium bicarbonate 650 mg tablet Take 650 mg by mouth 3 (three) times a day before meals      sucralfate (CARAFATE) 1 g/10 mL suspension Take 10 mL (1,000 mg total) by mouth every 6 (six) hours 420 mL 0    tamsulosin (FLOMAX) 0 4 mg Take 1 capsule (0 4 mg total) by mouth daily with dinner 90 capsule 3    [DISCONTINUED] metoprolol tartrate (LOPRESSOR) 25 mg tablet Take 0 5 tablets (12 5 mg total) by mouth every 12 (twelve) hours 30 tablet 0     No current facility-administered medications on file prior to visit              Physical Exam:    Vitals:    09/16/20 1551   Resp: 20   Weight: 74 6 kg (164 lb 8 oz)   Height: 5' 8" (1 727 m)       General Appearance:  Alert, cooperative, no distress, appears stated age   Lungs:   respirations unlabored   Heart:  Normal heart rate noted   Abdomen:   Soft, non-tender,  no masses   Extremities: Extremities normal, atraumatic, no cyanosis or edema   Pulses: 2+ and symmetric   Skin: Skin color, texture, turgor normal, no rashes or lesions   Neurologic: Normal         Ortho Exam  Left upper extremity   · Skin intact without erythema, ecchymosis, rash, swelling in shoulder  Mild swelling noted Left hand  · Mild tenderness to palpation   · Full AROM elbow, wrist and fingers  Painful ROM of Left shoulder   · Sensation intact axillary, musculocutaneous, media, radial and ulnar distributions     Imaging Studies: The following imaging studies were reviewed in office today  My findings are noted  MRI performed 09/15/2020 of Left shoulder demonstrates bone marrow edema and nondisplaced fracture humerus; probable impaction fracture  High grade partial supraspinatus tear  Xrays taken today 09/16/2020 demonstrates no visible fracture  ASSESSMENT:    Christina Hickman was seen today for pain and swelling  Diagnoses and all orders for this visit:    Closed nondisplaced fracture of surgical neck of left humerus with routine healing, unspecified fracture morphology, subsequent encounter  -     Ambulatory referral to Orthopedic Surgery    Rotator cuff strain, left, sequela  -     Ambulatory referral to Orthopedic Surgery            PLAN: 80 yo male Left humerus impaction fracture   - Advised to wear sling for comfort, but patient need walker for ambulation  He has been placing Left hand on walker for steadiness while steering with Right hand  Maintain sling while at home  - Pendulum exercises at home  Patient's wife states they have PT/OT coming to house regularly  No PT/OT at this time  But, pendulums okay    - Offered stronger analgesic for pain relief, patient denied at this time and will continue Tylenol   - Follow up in 2 weeks  for reevaluation and new xrays upon arrival                 Scribe Attestation    I,:   RILEY Nieves PA-C am acting as a scribe while in the presence of the attending physician :        I,:   Catherine Yates MD personally performed the services described in this documentation    as scribed in my presence :

## 2020-09-17 NOTE — PROGRESS NOTES
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)    - CBC and differential; Future  - Comprehensive metabolic panel; Future  - Magnesium; Future  - Chromogranin A; Future    3  Normocytic anemia  Workup was done on multiple occasions which was negative for vitamin B12 deficiency or iron deficiency  His hemoglobin level seems to be stable around 10 g  He does have chronic kidney disease which is a contributing factor   - CBC and differential; Future  - Comprehensive metabolic panel; Future        HPI:  The patient came today for a follow-up visit with his wife  He apparently fell down recently and sustained a fracture of the neck of the humerus of the left upper extremity  This was confirmed with an MRI which showed nondisplaced fracture of the surgical neck of the left humerus  The patient has significant limitation of the range of motion in that arm due to pain  He had blood work on 09/14/2020 which showed stable high creatinine of 3 6 with calcium of 9 1 and normal liver enzymes  His hemoglobin level seems to be better 10 7 with normal white cells and platelets  Chromogranin a level was 1567 which seems to be stable  He denied diarrhea or bleeding from any sites    Oncology History   Patient has multiple comorbid conditions including history of coronary artery disease, diabetes mellitus, hypertension, hyperlipidemia, and more recently metastatic neuroendocrine tumor       The patient was seen in consultation when he was in the hospital on the 5th February 2019 at that time he had significant weakness status post vasovagal syncope   He was evaluated with a CT scan of the abdomen and pelvis on the 23rd January 2019 which showed partially calcified mesenteric mass with multiple liver lesions compatible most likely with carcinoid malignancy   He also was found to have bilateral hydronephrosis and severe bladder wall thickening with prostatomegaly      The patient then had a core biopsy from 1 of the liver lesions on the 24th of January which showed well-differentiated neuroendocrine tumor grade 1-2 with Ki 67 of 3-5%      His chromogranin level  February 2019 was 273   His 24 hour urine for HIAA was 48 which is above normal   The patient was started on Lanreotide in March 2019         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 ([facility] )   1/24/2019 Initial Diagnosis    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 ([facility] [address] )     1/24/2019 Biopsy    A  Liver mass, core biopsy:  - Well differentiated neuroendocrine tumor, G2        3/2019 -  Chemotherapy    Lanreotide 120 mg every 4 weeks injections          Interval history:    ROS: Review of Systems   Constitutional: Negative for appetite change, diaphoresis, fatigue and fever  HENT: Negative for congestion, dental problem, facial swelling, hearing loss, tinnitus and trouble swallowing  Eyes: Negative for visual disturbance  Respiratory: Positive for shortness of breath  Negative for cough, chest tightness and wheezing  Cardiovascular: Negative for chest pain and leg swelling  Gastrointestinal: Negative for abdominal distention, abdominal pain, blood in stool, constipation, diarrhea, nausea and vomiting  Genitourinary: Negative for dysuria, hematuria and urgency  Musculoskeletal: Positive for arthralgias  Negative for myalgias and neck pain  Skin: Negative  Negative for color change, pallor, rash and wound     Neurological: Positive for numbness  Negative for dizziness, weakness and headaches  Hematological: Negative for adenopathy  Psychiatric/Behavioral: Negative for agitation, behavioral problems, confusion, hallucinations, self-injury and sleep disturbance  The patient is not nervous/anxious and is not hyperactive  Past Medical History:   Diagnosis Date    Acute pancreatitis without infection or necrosis 9/26/2019    Anemia of chronic renal failure     BPH (benign prostatic hyperplasia)     Chronic kidney disease (CKD), stage IV (severe)     Coronary artery disease     DJD (degenerative joint disease)     Essential hypertension     Gait disturbance     Generalized weakness     GERD (gastroesophageal reflux disease)     Gout     History of transfusion     Hydronephrosis     Hyperlipidemia     Hypertension     Liver cancer     Pressure injury of skin     Proteinuria     Renal disorder     Retention of urine     Thrombophlebitis migrans     Type 2 diabetes mellitus        Past Surgical History:   Procedure Laterality Date    CHOLECYSTECTOMY      CHOLECYSTECTOMY LAPAROSCOPIC N/A 2/7/2020    Procedure: CHOLECYSTECTOMY LAPAROSCOPIC;  Surgeon: Robe Tate MD;  Location: [facility] MAIN OR;  Service: General    CORONARY ANGIOPLASTY WITH STENT PLACEMENT      FL RETROGRADE PYELOGRAM  5/21/2020    IR BIOPSY LIVER MASS  1/24/2019    IR CHOLECYSTOSTOMY TUBE PLACEMENT  9/6/2019    TX CYSTO/URETERO W/LITHOTRIPSY &INDWELL STENT INSRT Left 5/21/2020    Procedure: CYSTOSCOPY URETEROSCOPY WITH LITHOTRIPSY HOLMIUM LASER, RETROGRADE PYELOGRAM AND INSERTION STENT URETERAL;  Surgeon: Louis Butterfield MD;  Location: [facility] MAIN OR;  Service: Urology    TX CYSTOURETHROSCOPY,URETER CATHETER Left 4/21/2020    Procedure: CYSTOSCOPY WITH INSERTION STENT URETERAL;  Surgeon: Isis Barron MD;  Location: [facility] Main [facility];  Service: Urology       Social History     Socioeconomic History    Marital status:       Spouse name: None    Number of children: None    Years of education: None    Highest education level: None   Occupational History    None   Social Needs    Financial resource strain: None    Food insecurity     Worry: None     Inability: None    Transportation needs     Medical: None     Non-medical: None   Tobacco Use    Smoking status: Never Smoker    Smokeless tobacco: Never Used   Substance and Sexual Activity    Alcohol use: Not Currently     Alcohol/week: 0 0 standard drinks     Frequency: Never    Drug use: Never    Sexual activity: Not Currently   Lifestyle    Physical activity     Days per week: None     Minutes per session: None    Stress: None   Relationships    Social connections     Talks on phone: None     Gets together: None     Attends [employer] service: None     Active member of club or organization: None     Attends meetings of clubs or organizations: None     Relationship status: None    Intimate partner violence     Fear of current or ex partner: None     Emotionally abused: None     Physically abused: None     Forced sexual activity: None   Other Topics Concern    None   Social History Narrative    None       Family History   Problem Relation Age of Onset    Cancer Mother     Hypertension Father     Cancer Brother     Cancer Maternal Grandmother     Cancer Paternal Aunt        No Known Allergies      Current Outpatient Medications:     acetaminophen (TYLENOL) 325 mg tablet, Take 650 mg by mouth every 6 (six) hours as needed , Disp: , Rfl:     aspirin 81 MG tablet, Take 81 mg by mouth daily, Disp: , Rfl:     B Complex-C-Folic Acid (TRIPHROCAPS) 1 MG CAPS, Take 1 capsule (1 mg total) by mouth daily with dinner, Disp: 30 each, Rfl: 5    BD INSULIN SYRINGE U/F 30G X 1/2" 0 3 ML MISC, , Disp: , Rfl:     cholecalciferol (VITAMIN D3) 1,000 units tablet, Take 1,000 Units by mouth daily, Disp: , Rfl:     cholestyramine sugar free (QUESTRAN LIGHT) 4 g packet, Take 1 packet (4 g total) by mouth 2 (two) times a day, Disp: 60 packet, Rfl: 0    colchicine (COLCRYS) 0 6 mg tablet, Take 1 tablet (0 6 mg total) by mouth daily, Disp: 90 tablet, Rfl: 3    famotidine (PEPCID) 10 mg tablet, Take 1 tablet (10 mg total) by mouth daily at bedtime, Disp: 30 tablet, Rfl: 0    furosemide (LASIX) 40 mg tablet, Take 1 tablet (40 mg total) by mouth daily , do NOT take if you do not have swelling, Disp: 30 tablet, Rfl: 5    insulin detemir (Levemir) 100 units/mL subcutaneous injection, Inject 5 Units under the skin daily before breakfast, Disp: , Rfl: 0    insulin lispro (HumaLOG) 100 units/mL injection, Inject 4 Units under the skin 3 (three) times a day before meals, Disp: , Rfl: 0    Insulin Syringe-Needle U-100 30G X 1/2" 1 ML MISC, by Does not apply route 3 (three) times a day, Disp: 100 each, Rfl: 5    magnesium oxide (MAG-OX) 400 mg, Take 1 tablet (400 mg total) by mouth 2 (two) times a day, Disp: 60 tablet, Rfl: 5    NIFEdipine ER (ADALAT CC) 60 MG 24 hr tablet, Take 1 tablet (60 mg total) by mouth daily, Disp: 30 tablet, Rfl: 3    nystatin (MYCOSTATIN) powder, Apply topically 2 (two) times a day, Disp: 15 g, Rfl: 0    pantoprazole (PROTONIX) 40 mg tablet, Take 1 tablet (40 mg total) by mouth 2 (two) times a day, Disp: 90 tablet, Rfl: 0    sodium bicarbonate 650 mg tablet, Take 650 mg by mouth 3 (three) times a day before meals, Disp: , Rfl:     sucralfate (CARAFATE) 1 g/10 mL suspension, Take 10 mL (1,000 mg total) by mouth every 6 (six) hours, Disp: 420 mL, Rfl: 0    tamsulosin (FLOMAX) 0 4 mg, Take 1 capsule (0 4 mg total) by mouth daily with dinner, Disp: 90 capsule, Rfl: 3      Physical Exam:  /59 (BP Location: Right arm, Patient Position: Sitting, Cuff Size: Adult)   Pulse 96   Temp 98 1 °F (36 7 °C) (Tympanic)   Resp 18   Ht 5' 8" (1 727 m)   Wt 79 2 kg (174 lb 9 6 oz) Comment: It the correct weight   Spouse confirmed  SpO2 97%   BMI 26 55 kg/m²     Physical Exam  Constitutional: Appearance: He is well-developed  He is ill-appearing  HENT:      Head: Normocephalic and atraumatic  Eyes:      General: No scleral icterus  Right eye: No discharge  Left eye: No discharge  Conjunctiva/sclera: Conjunctivae normal       Pupils: Pupils are equal, round, and reactive to light  Neck:      Musculoskeletal: Normal range of motion and neck supple  Thyroid: No thyromegaly  Trachea: No tracheal deviation  Cardiovascular:      Rate and Rhythm: Normal rate and regular rhythm  Heart sounds: Normal heart sounds  No murmur  No friction rub  Pulmonary:      Effort: Pulmonary effort is normal  No respiratory distress  Breath sounds: Normal breath sounds  No wheezing or rales  Chest:      Chest wall: No tenderness  Abdominal:      General: Bowel sounds are normal  There is no distension  Palpations: Abdomen is soft  There is no hepatomegaly, splenomegaly or mass  Tenderness: There is no abdominal tenderness  There is no guarding or rebound  Musculoskeletal:         General: Tenderness (Of the left upper extremity due to recent fall) present  No deformity  Right lower leg: Edema present  Left lower leg: Edema present  Comments: Ambulatory dysfunction uses the walker  Lymphadenopathy:      Cervical: No cervical adenopathy  Skin:     General: Skin is warm and dry  Coloration: Skin is not pale  Findings: No erythema or rash  Neurological:      Mental Status: He is alert and oriented to person, place, and time  Cranial Nerves: No cranial nerve deficit  Coordination: Coordination normal       Deep Tendon Reflexes: Reflexes are normal and symmetric  Reflexes normal    Psychiatric:         Behavior: Behavior normal          Thought Content:  Thought content normal          Judgment: Judgment normal            Labs:  Lab Results   Component Value Date    WBC 8 04 09/14/2020    HGB 10 7 (L) 09/14/2020    HCT 32 7 (L) 09/14/2020    MCV 93 09/14/2020     09/14/2020     Lab Results   Component Value Date    K 4 0 09/14/2020     09/14/2020    CO2 25 09/14/2020    BUN 42 (H) 09/14/2020    CREATININE 3 62 (H) 09/14/2020    GLUF 112 (H) 09/14/2020    CALCIUM 9 1 09/14/2020    AST 19 09/14/2020    ALT 29 09/14/2020    ALKPHOS 154 (H) 09/14/2020    EGFR 15 09/14/2020     No results found for: TSH    Patient voiced understanding and agreement in the above discussion  Aware to contact our office with questions/symptoms in the interim

## 2020-09-20 PROBLEM — R13.19 ESOPHAGEAL DYSPHAGIA: Status: ACTIVE | Noted: 2020-01-01

## 2020-09-20 PROBLEM — N18.4 ACUTE RENAL FAILURE SUPERIMPOSED ON STAGE 4 CHRONIC KIDNEY DISEASE (HCC): Status: ACTIVE | Noted: 2020-01-01

## 2020-09-20 PROBLEM — N17.9 ACUTE RENAL FAILURE SUPERIMPOSED ON STAGE 4 CHRONIC KIDNEY DISEASE (HCC): Status: ACTIVE | Noted: 2020-01-01

## 2020-09-21 PROBLEM — N18.9 ANEMIA DUE TO CHRONIC KIDNEY DISEASE: Status: ACTIVE | Noted: 2020-01-01

## 2020-09-21 PROBLEM — D63.1 ANEMIA DUE TO CHRONIC KIDNEY DISEASE: Status: ACTIVE | Noted: 2020-01-01

## 2020-09-21 PROBLEM — R42 DIZZINESS: Status: ACTIVE | Noted: 2020-01-01

## 2020-09-21 NOTE — CASE MANAGEMENT
CM met with pt and his SO, René Stroud inside of pt's room  Pt alert  Pt is on contact precautions for MDRO and ESBL  CM name and role reviewed  Discharge Checklist reviewed and CM will continue to monitor for progress toward discharge goals in nursing and provider rounds  Pt lives with his SO, René Stroud in an one story home  René Stroud reported that they have a ramp now  Pt ambulates with a rolling walker  He has canes, wc, scooter when outside of the home, commode and shower chair  Pt receives assistance with ADLs from René Luanne  Pt is current with [facility]  CM informed them that STR is recommended as he is an assist x2  They refused STR at this time  They said that they want to resume SN, PT and OT services with [facility]  Pt has hx of SNF at [facility] [place]  Pt utilizes [address] in [place]  Pt has prescription coverage and reported no issues with copayments  Pt reported no MH background  Pt reported no D&A  Pt has no tobacco use  Pt's SO China Rodas and his son Carla Abreu are POAs  Pt receives SSI and René Stroud provides all transport and will do so upon discharge  CM reviewed discharge planning process including the following: identifying help at home, patient preference for discharge planning needs, pharmacy preference, and availability of treatment team to discuss questions or concerns patient and/or family may have regarding understanding medications and recognizing signs and symptoms once discharged  CM also encouraged patient to follow up with all recommended appointments after discharge  Patient advised of importance for patient and family to participate in managing patients medical well being  Pt would like to resume care with [address] for SN, PT and OT so CM sent [facility] referral to them

## 2020-09-21 NOTE — UTILIZATION REVIEW
Initial Clinical Review    OBS order 9/20 2009 converted to IP on 9/21 @ 1823 for continued neurological workup     Admitting Physician  [facility]     Level of Care  Med Surg     Estimated length of stay  More than 2 Midnights     Certification  I certify that inpatient services are medically necessary for this patient for a duration of greater than two midnights  See H&P and MD Progress Notes for additional information about the patient's course of treatment  ED Arrival Information     Expected Arrival Acuity Means of Arrival Escorted By Service Admission Type    - 9/20/2020 16:08 Emergent Ambulance [facility] Medicine Emergency    Arrival Complaint    vomiting        Chief Complaint   Patient presents with    Dizziness     pt presenys via EMS for c/o dizziness and vomiting since this morning  Pt has hx cancer and diabetes  Received 4 mg zofran en route, still vomiting on arrival      Vomiting     Assessment/Plan: 80 yo male to ED from home w/ N/V , since early this am began  Having dizziness and assoc N/V   Feels like something is stuck in his throat   Able to tolerate secretions , but difficulty swallowing       9/21 Nephrology consult   CKD baseline creat 3 5-4 6, on arrival 4 72  started on IVF w/ improvement   N/V started 1 day ago , unsure if uremic sx vs viral gastritis   9/21 GI consult   N/V Globus sensation , developed acute N/V assoc w/ new onset vertigo yest  then had globus sensation after several episodes of vomiting   All symptoms have resolved  suspect he had acute esophagitis after multiple vomiting episodes causing his globus sensation  Plan to cont protonix and pepcid   Ok to eat       9/21 Neuro consult   Dizziness Given truncal weakness on exam and reported dysphagia, will obtain MRI brain to rule out acute posterior infarct  If MRI brain is unremarkable for infarct, etiology likely peripheral vertigo   Check hgb a1c, ua cx , start on asa, supportive care   Trial meclizine if MRI neg     9/21 IM note   Pt c/o dizziness prior to N/V yest   MRI completed and no signs of acute pathology   Esophageal dysphagia GI consulted and zofran prn   SILVERIO nephrology following   GERD cont PPI  Pt will require additional neurological eval for suspected posterior circulation CVA , amb dysfunction      ED Triage Vitals   Temperature Pulse Respirations Blood Pressure SpO2   09/20/20 1745 09/20/20 1620 09/20/20 1620 09/20/20 1620 09/20/20 1620   97 8 °F (36 6 °C) (!) 122 20 152/71 98 %      Temp Source Heart Rate Source Patient Position - Orthostatic VS BP Location FiO2 (%)   09/20/20 1620 09/20/20 1620 09/20/20 1620 09/20/20 1620 --   Oral Monitor Lying Right arm       Pain Score       09/20/20 1745       5          Wt Readings from Last 1 Encounters:   09/20/20 82 kg (180 lb 12 4 oz)     Additional Vital Signs:   09/21/20 07:56:01      73      156/79   105   95 %                09/21/20 0000      84      154/77      93 %         None (Room air)       09/20/20 2115   97 6 °F (36 4 °C)   105   18   151/82      96 %         None (Room air)   Lying    09/20/20 2030      88   12   168/76   109   98 %         None (Room air)   Lying    09/20/20 2000      87   13   169/73   105   97 %         None (Room air)   Lying    09/20/20 1930      82   12   149/74   106   98 %         None (Room air)   Lying    09/20/20 1907      78   18   138/62      98 %         None (Room air)       09/20/20 1900      76   19   144/70   101   99 %   28   2 L/min   Nasal cannula   Lying    09/20/20 1830      81   17   139/67      96 %   28   2 L/min   Nasal cannula   Lying    09/20/20 1800      87   16   162/71      94 %         None (Room air)   Lying    09/20/20 1745   97 8 °F (36 6 °C)   87   17   157/72   104   95 %         None (Room air)   Lying    09/20/20 1715      92   20   175/78Abnormal        98 %         None (Room air)   Sitting    09/20/20 1700      86   19 153/67      97 %         None (Room air)   Lying    09/20/20 1650      93   20   120/86      98 %         None (Room air)   Lying    09/20/20 1635      95   21   129/90      97 %                Pertinent Labs/Diagnostic Test Results:   9/20 CT neck -   No evidence of mass, lymphadenopathy or inflammation in the neck        9/20 CT head - wnl   9/20 PCXR No acute cardiopulmonary disease  Results from last 7 days   Lab Units 09/20/20  1630   SARS-COV-2  Negative     Results from last 7 days   Lab Units 09/21/20  0545 09/20/20  1630 09/14/20  0822   WBC Thousand/uL 7 34 10 32* 8 04   HEMOGLOBIN g/dL 8 4* 11 2* 10 7*   HEMATOCRIT % 26 2* 34 3* 32 7*   PLATELETS Thousands/uL 128* 179 170   NEUTROS ABS Thousands/µL 5 46  --  4 93     Results from last 7 days   Lab Units 09/21/20  0545 09/20/20  1630 09/14/20  0822   SODIUM mmol/L 142 139 141   POTASSIUM mmol/L 4 0 3 9 4 0   CHLORIDE mmol/L 106 101 106   CO2 mmol/L 25 24 25   ANION GAP mmol/L 11 14* 10   BUN mg/dL 45* 46* 42*   CREATININE mg/dL 4 40* 4 72* 3 62*   EGFR ml/min/1 73sq m 12 11 15   CALCIUM mg/dL 8 3 9 0 9 1   MAGNESIUM mg/dL  --   --  2 4     Results from last 7 days   Lab Units 09/20/20  1630 09/14/20  0822   AST U/L 16 19   ALT U/L 26 29   ALK PHOS U/L 156* 154*   TOTAL PROTEIN g/dL 7 8 7 4   ALBUMIN g/dL 3 7 3 2*   TOTAL BILIRUBIN mg/dL 0 50 0 63     Results from last 7 days   Lab Units 09/21/20  0736 09/21/20  0038 09/20/20  1631   POC GLUCOSE mg/dl 136 182* 192*     Results from last 7 days   Lab Units 09/21/20  0545 09/20/20  1630   GLUCOSE RANDOM mg/dL 146* 210*     Results from last 7 days   Lab Units 09/20/20  1630   TROPONIN I ng/mL <0 02     Results from last 7 days   Lab Units 09/20/20  1630   PROTIME seconds 13 2   INR  0 98   PTT seconds 29     Results from last 7 days   Lab Units 09/20/20  1844   CLARITY UA  Cloudy   COLOR UA  Yellow   SPEC GRAV UA  1 025   PH UA  5 5   GLUCOSE UA mg/dl 250 (1/4%)*   KETONES UA mg/dl Trace*   BLOOD UA  Small*   PROTEIN UA mg/dl 100 (2+)*   NITRITE UA  Negative   BILIRUBIN UA  Negative   UROBILINOGEN UA E U /dl 0 2   LEUKOCYTES UA  Moderate*   WBC UA /hpf 20-30*   RBC UA /hpf 0-1*   BACTERIA UA /hpf Moderate*   EPITHELIAL CELLS WET PREP /hpf Occasional     ED Treatment:   Medication Administration from 09/20/2020 1607 to 09/20/2020 2115       Order Dose Route Action     ondansetron (ZOFRAN) injection 4 mg 4 mg Intravenous Given     glucagon (GLUCAGEN) injection 1 mg 1 mg Intravenous Given     ondansetron (FOR EMS ONLY) (ZOFRAN) 4 mg/2 mL injection 4 mg 0 mg Does not apply Given to EMS     fentanyl citrate (PF) 100 MCG/2ML 25 mcg 25 mcg Intravenous Given     diazepam (VALIUM) injection 2 5 mg 2 5 mg Intravenous Given     Lidocaine Viscous HCl (XYLOCAINE) 2 % mucosal solution 15 mL 15 mL Swish & Swallow Given     ceftriaxone (ROCEPHIN) 1 g/50 mL in dextrose IVPB 1,000 mg Intravenous New Bag     sodium chloride 0 9 % bolus 500 mL 500 mL Intravenous New Bag        Past Medical History:   Diagnosis Date    Acute pancreatitis without infection or necrosis 9/26/2019    Anemia of chronic renal failure     BPH (benign prostatic hyperplasia)     Chronic kidney disease (CKD), stage IV (severe)     Coronary artery disease     DJD (degenerative joint disease)     Essential hypertension     Gait disturbance     Generalized weakness     GERD (gastroesophageal reflux disease)     Gout     History of transfusion     Hydronephrosis     Hyperlipidemia     Hypertension     Liver cancer     Pressure injury of skin     Proteinuria     Renal disorder     Retention of urine     Thrombophlebitis migrans     Type 2 diabetes mellitus      Present on Admission:   Diastolic dysfunction   Gastroesophageal reflux disease without esophagitis   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)   Mixed hyperlipidemia   Essential hypertension      Admitting Diagnosis: Esophageal dysphagia [R13 10]  Dizziness [R42]  Vomiting [R11 10]  Acute renal failure superimposed on stage 4 chronic kidney disease (HCC) [N17 9, N18 4]  Nausea and vomiting, intractability of vomiting not specified, unspecified vomiting type [R11 2]  Age/Sex: 79 y o  male  Admission Orders:  Scheduled Medications:  heparin (porcine), 5,000 Units, Subcutaneous, Q8H [address]  insulin detemir, 5 Units, Subcutaneous, Daily Before Breakfast  insulin lispro, 1-5 Units, Subcutaneous, Q6H NATACHA  insulin lispro, 4 Units, Subcutaneous, TID AC  NIFEdipine ER, 60 mg, Oral, Daily  pantoprazole, 40 mg, Intravenous, Q12H NATACHA      Continuous IV Infusions:     PRN Meds:  acetaminophen, 650 mg, Oral, Q6H PRN  Labetalol HCl, 10 mg, Intravenous, Q4H PRN  ondansetron, 4 mg, Intravenous, Q6H PRN    Fingerstick ac and hs   NPO   OT PT eval   I&O   Neuro checks   Tele     IP CONSULT TO GASTROENTEROLOGY  IP CONSULT TO NEPHROLOGY  IP CONSULT TO CASE MANAGEMENT    Network Utilization Review Department  [email or web address]  ATTENTION: Please call with any questions or concerns to [phone] and carefully listen to the prompts so that you are directed to the right person  All voicemails are confidential   Jeaneth Skipper all requests for admission clinical reviews, approved or denied determinations and any other requests to dedicated fax number below belonging to the campus where the patient is receiving treatment   List of dedicated fax numbers for the Facilities:  FACILITY NAME UR FAX NUMBER   ADMISSION DENIALS (Administrative/Medical Necessity) [phone]   [address] [place] [address] (Maternity/NICU/Pediatrics) [phone]   Devonte Holden [phone]   Link Davis [phone]   Liat [place] [phone]   [place] [address] [phone] [address] Eileen Zayas [address] [phone]

## 2020-09-21 NOTE — PHYSICAL THERAPY NOTE
Physical Therapy Evaluation     Patient's Name: Megha Sarah    Admitting Diagnosis  Esophageal dysphagia [R13 10]  Dizziness [R42]  Vomiting [R11 10]  Acute renal failure superimposed on stage 4 chronic kidney disease ([facility] ) [N17 9, N18 4]  Nausea and vomiting, intractability of vomiting not specified, unspecified vomiting type [R11 2]    Problem List  Patient Active Problem List   Diagnosis    Type 2 diabetes mellitus with hyperglycemia, with long-term current use of insulin ([facility] )    Essential hypertension    Mixed hyperlipidemia    Iron deficiency anemia secondary to inadequate dietary iron intake    Syncope and collapse    Liver mass    Neuroendocrine tumor    Acute cystitis without hematuria    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)    Pressure injury of right heel, unstageable ([address] )    Atherosclerosis of artery of extremity with ulceration ([facility] )    Carcinoid syndrome ([address] )    Urinary catheter in place    Malignant neuroendocrine neoplasm ([address] )    Abdominal pain    UTI due to extended-spectrum beta lactamase (ESBL) producing Escherichia coli    Hypomagnesemia    Normocytic anemia    Goals of care, counseling/discussion    Hyperparathyroid bone disease ([address] )    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    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    Low TSH level    Severe protein-calorie malnutrition (HCC)    Diastolic dysfunction    Premature atrial complexes    Bradycardia    Generalized weakness    Recurrent UTI    Gastroesophageal reflux disease without esophagitis    Ureterolithiasis    Obstructive uropathy    Gait difficulty    Coronary artery disease    Azotemia    Metabolic acidosis    Hypertensive kidney disease with stage 4 chronic kidney disease (HCC)    Stage 4 chronic kidney disease (HCC)    Edema    Chest pain    Chest pressure    Urinary retention    Elevated d-dimer    CKD (chronic kidney disease)    Esophageal dysphagia    Acute renal failure superimposed on stage 4 chronic kidney disease [facility])       Past Medical History  Past Medical History:   Diagnosis Date    Acute pancreatitis without infection or necrosis 9/26/2019    Anemia of chronic renal failure     BPH (benign prostatic hyperplasia)     Chronic kidney disease (CKD), stage IV (severe)     Coronary artery disease     DJD (degenerative joint disease)     Essential hypertension     Gait disturbance     Generalized weakness     GERD (gastroesophageal reflux disease)     Gout     History of transfusion     Hydronephrosis     Hyperlipidemia     Hypertension     Liver cancer     Pressure injury of skin     Proteinuria     Renal disorder     Retention of urine     Thrombophlebitis migrans     Type 2 diabetes mellitus        Past Surgical History  Past Surgical History:   Procedure Laterality Date    CHOLECYSTECTOMY      CHOLECYSTECTOMY LAPAROSCOPIC N/A 2/7/2020    Procedure: CHOLECYSTECTOMY LAPAROSCOPIC;  Surgeon: Joseph Ulloa MD;  Location: [facility] MAIN OR;  Service: General    CORONARY ANGIOPLASTY WITH STENT PLACEMENT      FL RETROGRADE PYELOGRAM  5/21/2020    IR BIOPSY LIVER MASS  1/24/2019    IR CHOLECYSTOSTOMY TUBE PLACEMENT  9/6/2019    OH CYSTO/URETERO W/LITHOTRIPSY &INDWELL STENT INSRT Left 5/21/2020    Procedure: CYSTOSCOPY URETEROSCOPY WITH LITHOTRIPSY HOLMIUM LASER, RETROGRADE PYELOGRAM AND INSERTION STENT URETERAL;  Surgeon: Kalie Llanos MD;  Location: [facility];  Service: Urology    OH CYSTOURETHROSCOPY,URETER CATHETER Left 4/21/2020    Procedure: CYSTOSCOPY WITH INSERTION STENT URETERAL;  Surgeon: Elisa Canas MD;  [facility];  Service: Urology        09/21/20 0840   PT Last Visit   PT Visit Date 09/21/20   Note Type   Note type Eval/Treat   Pain Assessment   Pain Assessment Tool Pain Assessment not indicated - pt denies pain   Pain Score No Pain   Home Living   Type of [address] [place] [address] Av One level;Performs ADLs on one level; Other (Comment); Stairs to enter with rails; Ramped entrance  (4 BETH)   Bathroom Shower/Tub Walk-in shower   Bathroom Toilet Standard  (c commode)   [address] [facility]   [address] [facility]  (rollator in community )   Prior Function   Level of [place] Needs assistance with IADLs; Needs assistance with ADLs and functional mobility   Lives With Significant other   Receives Help From Other (Comment)  (significant other)   ADL Assistance Needs assistance   IADLs Needs assistance   Falls in the last 6 months 1 to 4  (1 fall 3 weeks ago " bent over and lost balance")   Vocational Retired   Comments pt does not drive   Restrictions/Precautions   Weight Bearing Precautions Per Order No   Braces or Orthoses Other (Comment)  (sling L UE)   Other Precautions Contact/isolation; Chair Alarm; Bed Alarm;Multiple lines; Fall Risk;Cognitive   General   Additional Pertinent History Per chart review, pt with fall recently end of August, had MRI performed which revealed "Nondisplaced fracture of the surgical neck humerus with bone marrow edema  Musculotendinous strain noted within the supraspinatus, infraspinatus and subscapularis  Chronic high-grade partial effectively complete articular surface tear at the level of the footprint of the supraspinatus"  Pt saw orthopedics 9/16/20 and the plan established by Dr Ralph Brian "Advised to wear sling for comfort, but patient need walker for ambulation  He has been placing Left hand on walker for steadiness while steering with Right hand  Maintain sling while at home  Pendulum exercises at home  Patient's wife states they have PT/OT coming to house regularly  No PT/OT at this time  But, pendulums okay  Follow up in 2 weeks  for reevaluation and new xrays upon arrival " At this time, sling donned to L UE and maintained NWB to L UE, will maintain until cleared by MD for [facility]  PT discussed same with KALYN French and PCA     Family/Caregiver Present No   Cognition   Overall Cognitive Status Impaired   Arousal/Participation Alert   Orientation Level Oriented X4   Memory Decreased recall of recent events;Decreased recall of precautions  (defer to OT eval for comments)   Following Commands Follows multistep commands with increased time or repetition   Comments pt agreeable to PT evaluation   RUE Assessment   RUE Assessment   (defer to OT eval for comments)   LUE Assessment   LUE Assessment   (defer to OT eval for comments)   RLE Assessment   RLE Assessment X  (grossly 3+/5 assessed c functional mobility)   LLE Assessment   LLE Assessment X  (grossly 3+/5 assessed c functional mobility)   Coordination   Movements are Fluid and Coordinated 1   Sensation WFL   Light Touch   RLE Light Touch Grossly intact   LLE Light Touch Grossly intact   Bed Mobility   Supine to Sit 3  Moderate assistance   Additional items Assist x 2;HOB elevated; Increased time required;Verbal cues   Transfers   Sit to Stand 3  Moderate assistance   Additional items Assist x 2; Increased time required;Verbal cues   Stand to Sit 3  Moderate assistance   Additional items Assist x 2; Increased time required;Verbal cues   Ambulation/Elevation   Gait pattern Decreased foot clearance; Short stride; Step to;Excessively slow; Shuffling   Gait Assistance 3  Moderate assist   Additional items Assist x 2;Verbal cues; Tactile cues   Assistive Device   (R UE HHA)   Distance 2' from EOB to recliner   Stair Management Assistance Not tested   Balance   Static Sitting Fair +   Dynamic Sitting Fair -   Static Standing Poor +   Dynamic Standing Poor   Ambulatory Poor   Endurance Deficit   Endurance Deficit Yes   Activity Tolerance   Activity Tolerance Patient limited by fatigue   Medical Staff Made Aware OT Cherri Gonzalez   Nurse Made Aware KALYN Emery verbalized pt appropriate to see, made aware of session outcome/recs   Assessment   Prognosis Fair   Problem List Decreased strength;Decreased endurance; Impaired balance;Decreased mobility;Orthopedic restrictions;Decreased cognition   Assessment Pt is 79 y o  male seen for PT evaluation on 9/21/2020 s/p admit to [facility] on 9/20/2020 w/ Esophageal dysphagia  Pt presents c nausea, vomiting, and dizziness  PT consulted to assess pt's functional mobility and d/c needs  Order placed for PT eval and tx, w/ activity as tolerated order  Performed at least 2 patient identifiers during session: Name and wristband  Comorbidities affecting pt's physical performance at time of assessment include: acute pancreatitis, anemia of chronic renal failure, BPH, CKD stage 4, CAD, DJD, essential HTN, GERD, gait disturbance, generalized weakness, gout, HLD, hydronephrosis, HTN, liver cancer, proteinuria, renal disorder, retention of urine, thrombophlebitis migrans, type 2 DM  PTA, pt was independent w/ all functional mobility w/ rollator in community, ambulates community distances and elevations, ambulates household distances, has 0 / ramp BETH, lives w/ S O  in 1 level home and retired  Personal factors affecting pt at time of IE include: inaccessible home environment, inability to ambulate household distances, inability to navigate level surfaces w/o external assistance, limited home support, positive fall history and decreased initiation and engagement  Please find objective findings from PT assessment regarding body systems outlined above with impairments and limitations including weakness, impaired balance, decreased endurance, gait deviations, decreased activity tolerance, fall risk and orthopedic restrictions, as well as mobility assessment (need for cueing for mobility technique)  The following objective measures performed on IE also reveal limitations: Barthel Index: 30/100 and Modified [place]: 4 (moderate/severe disability)   Pt's clinical presentation is currently unstable/unpredictable seen in pt's presentation of abnormal lab value(s), need for input for task focus and mobility technique and ongoing medical assessment  Pt to benefit from continued PT tx to address deficits as defined above and maximize level of functional independent mobility and consistency  From PT/mobility standpoint, recommendation at time of d/c would be STR pending progress in order to facilitate return to PLOF  Barriers to Discharge Inaccessible home environment;Decreased caregiver support   Goals   Patient Goals to return home   STG Expiration Date 10/01/20   Short Term Goal #1 In 7-10 days: Increase bilateral LE strength 1/2 grade to facilitate independent mobility, Perform all bed mobility tasks modified independent to decrease caregiver burden, Perform all transfers modified independent to improve independence, Ambulate > 50' x3 ft  with least restrictive assistive device with min A of 1 w/o LOB and w/ normalized gait pattern 100% of the time, Increase all balance 1/2 grade to decrease risk for falls, Tolerate 4 hr OOB to faciliate upright tolerance, Improve Barthel Index score to 45 or greater to facilitate independence and PT provider will perform functional balance assessment to determine fall risk   PT Treatment Day 1   Plan   Treatment/Interventions LE strengthening/ROM; Functional transfer training; Endurance training; Therapeutic exercise;Patient/family training;Equipment eval/education; Bed mobility;Gait training;Spoke to nursing;Spoke to case management;OT;Continued evaluation   PT Frequency   (3-5x/wk)   Recommendation   PT Discharge Recommendation Post-Acute Rehabilitation Services   Equipment Recommended   (TBD)   PT - OK to Discharge Yes  (when medically cleared if to STR)   Modified Fisher Scale   Modified Fisher Scale 4   Barthel Index   Feeding 5   Bathing 0   Grooming Score 0   Dressing Score 5   Bladder Score 0   Bowels Score 10   Toilet Use Score 5   Transfers (Bed/Chair) Score 5   Mobility (Level Surface) Score 0   Stairs Score 0   Barthel Index Score 30           Irene Kraus PT, DPT      Physical Therapy Treatment Note  Time In: 840  Time Out: 855  Total Time: 15 min    S:  Pt agreeable to PT treatment session s/p PT eval, in no apparent distress and responsive  O:  Pt seen for PT treatment session this date with interventions consisting of therapeutic activity consisting of training: sit<>stand transfers, static standing tolerance for 2 minutes w/ R UE support and vc and tactile cues for static standing posture faciliation  No pain reported throughout exercise reps  VC required for technique  A:  Pt requiring max vc for technique, posture, appropriate hand/foot placement during sit to stand transfers; R UE HHA provided only at this time  Education for maintaining NWB L UE & sling until cleared by MD  Pt declining LE therex at this time  Post session: pt returned back to recliner, chair alarm engaged, all needs in reach and RN notified of session findings/recommendations  P:  Continue to recommend STR at time of d/c in order to maximize pt's functional independence and safety w/ mobility  Pt continues to be functioning below baseline level, and remains limited 2* factors listed above  PT will continue to see pt while here in order to address the deficits listed above and provide interventions consistent w/ POC in effort to achieve STGs      Marline Wang, PT, DPT

## 2020-09-21 NOTE — CONSULTS
Consultation - [facility]  Hetal Roberson 79 y o  male MRN: [id number]  Unit/Bed#: -01 Encounter: [id number]        Consults    Reason for Consult / Principal Problem: Nausea/Vomiting    HPI: Mr Teodora Dsouza is a 80 yo M with a PMH of DM2, low grade NE tumor on monthly lanreotide, HTN, GERD, CKD stage IV, presented after developing for to go yesterday morning around 7:00 a m  Causing nausea and vomiting  He has never had issues with vertigo before  After he vomited several times he felt a sensation like something was stuck in his throat or sitting in his throat  This sensation has since resolved  He denies any issues with dysphagia or globus sensation prior to having multiple episodes of vomiting yesterday  He does not have any abdominal pain  He feels like he is back to his baseline and like to try to eat something  He is scheduled to have his lanreotide treatment tomorrow  REVIEW OF SYSTEMS: Negative except for as stated above    Historical Information   Past Medical History:   Diagnosis Date    Acute pancreatitis without infection or necrosis 9/26/2019    Anemia of chronic renal failure     BPH (benign prostatic hyperplasia)     Chronic kidney disease (CKD), stage IV (severe)     Coronary artery disease     DJD (degenerative joint disease)     Essential hypertension     Gait disturbance     Generalized weakness     GERD (gastroesophageal reflux disease)     Gout     History of transfusion     Hydronephrosis     Hyperlipidemia     Hypertension     Liver cancer     Pressure injury of skin     Proteinuria     Renal disorder     Retention of urine     Thrombophlebitis migrans     Type 2 diabetes mellitus      Past Surgical History:   Procedure Laterality Date    CHOLECYSTECTOMY      CHOLECYSTECTOMY LAPAROSCOPIC N/A 2/7/2020    Procedure: CHOLECYSTECTOMY LAPAROSCOPIC;  Surgeon:  Hanane Gong MD;  Location: [facility];  Service: [facility] CORONARY ANGIOPLASTY WITH STENT PLACEMENT      FL RETROGRADE PYELOGRAM  5/21/2020    IR BIOPSY LIVER MASS  1/24/2019    IR CHOLECYSTOSTOMY TUBE PLACEMENT  9/6/2019    MN CYSTO/URETERO W/LITHOTRIPSY &INDWELL STENT INSRT Left 5/21/2020    Procedure: CYSTOSCOPY URETEROSCOPY WITH LITHOTRIPSY HOLMIUM LASER, RETROGRADE PYELOGRAM AND INSERTION STENT URETERAL;  Surgeon: Juan Antonio Mckeon MD;  [facility];  Service: Urology    MN CYSTOURETHROSCOPY,URETER CATHETER Left 4/21/2020    Procedure: CYSTOSCOPY WITH INSERTION STENT URETERAL;  Surgeon: Nadja Shahid MD;  [facility];  Service: Urology     Social History   Social History     Substance and Sexual Activity   Alcohol Use Not Currently    Alcohol/week: 0 0 standard drinks    Frequency: Never     Social History     Substance and Sexual Activity   Drug Use Never     Social History     Tobacco Use   Smoking Status Never Smoker   Smokeless Tobacco Never Used     Family History   Problem Relation Age of Onset    Cancer Mother     Hypertension Father     Cancer Brother     Cancer Maternal Grandmother     Cancer Paternal Aunt        Meds/Allergies     Medications Prior to Admission   Medication    acetaminophen (TYLENOL) 325 mg tablet    aspirin 81 MG tablet    B Complex-C-Folic Acid (TRIPHROCAPS) 1 MG CAPS    BD INSULIN SYRINGE U/F 30G X 1/2" 0 3 ML MISC    cholecalciferol (VITAMIN D3) 1,000 units tablet    cholestyramine sugar free (QUESTRAN LIGHT) 4 g packet    colchicine (COLCRYS) 0 6 mg tablet    famotidine (PEPCID) 10 mg tablet    furosemide (LASIX) 40 mg tablet    insulin detemir (Levemir) 100 units/mL subcutaneous injection    insulin lispro (HumaLOG) 100 units/mL injection    Insulin Syringe-Needle U-100 30G X 1/2" 1 ML MISC    magnesium oxide (MAG-OX) 400 mg    nystatin (MYCOSTATIN) powder    pantoprazole (PROTONIX) 40 mg tablet    sodium bicarbonate 650 mg tablet    sucralfate (CARAFATE) 1 g/10 mL suspension    tamsulosin (FLOMAX) 0 4 mg  NIFEdipine ER (ADALAT CC) 60 MG 24 hr tablet     Current Facility-Administered Medications   Medication Dose Route Frequency    acetaminophen (TYLENOL) tablet 650 mg  650 mg Oral Q6H PRN    heparin (porcine) subcutaneous injection 5,000 Units  5,000 Units Subcutaneous Q8H [address]    insulin detemir (LEVEMIR) subcutaneous injection 5 Units  5 Units Subcutaneous Daily Before Breakfast    insulin lispro (HumaLOG) 100 units/mL subcutaneous injection 1-5 Units  1-5 Units Subcutaneous Q6H [address]    insulin lispro (HumaLOG) 100 units/mL subcutaneous injection 4 Units  4 Units Subcutaneous TID AC    Labetalol HCl (NORMODYNE) injection 10 mg  10 mg Intravenous Q4H PRN    NIFEdipine (PROCARDIA XL) 24 hr tablet 60 mg  60 mg Oral Daily    ondansetron (ZOFRAN) injection 4 mg  4 mg Intravenous Q6H PRN    pantoprazole (PROTONIX) injection 40 mg  40 mg Intravenous Q12H NATACHA       No Known Allergies        Objective     Blood pressure 156/79, pulse 73, temperature (!) 97 4 °F (36 3 °C), resp  rate 18, height 5' 8" (1 727 m), weight 82 kg (180 lb 12 4 oz), SpO2 95 %  Intake/Output Summary (Last 24 hours) at 9/21/2020 1151  Last data filed at 9/21/2020 4938  Gross per 24 hour   Intake 0 ml   Output 1600 ml   Net -1600 ml         PHYSICAL EXAM:      General Appearance:   Alert, cooperative, no distress, appears stated age    HEENT:   Normocephalic, atraumatic, anicteric      Neck:  Supple, symmetrical, trachea midline   Lungs:   Clear to auscultation bilaterally; no rales, rhonchi or wheezing; respirations unlabored    Heart[de-identified]   S1 and S2 normal; regular rate and rhythm; no murmur, rub, or gallop     Abdomen:   Soft, non-tender, non-distended; normal bowel sounds; no masses, no organomegaly    Rectal:   Deferred    Extremities:  No cyanosis, clubbing or edema    Pulses:  2+ and symmetric all extremities    Skin:  Skin color, texture, turgor normal, no rashes or lesions      Lab Results:   Results from last 7 days   Lab Units 09/21/20  0545   WBC Thousand/uL 7 34   HEMOGLOBIN g/dL 8 4*   HEMATOCRIT % 26 2*   PLATELETS Thousands/uL 128*   NEUTROS PCT % 74   LYMPHS PCT % 14   MONOS PCT % 10   EOS PCT % 0     Results from last 7 days   Lab Units 09/21/20  0545 09/20/20  1630   POTASSIUM mmol/L 4 0 3 9   CHLORIDE mmol/L 106 101   CO2 mmol/L 25 24   BUN mg/dL 45* 46*   CREATININE mg/dL 4 40* 4 72*   CALCIUM mg/dL 8 3 9 0   ALK PHOS U/L  --  156*   ALT U/L  --  26   AST U/L  --  16     Results from last 7 days   Lab Units 09/20/20  1630   INR  0 98           Imaging Studies: I have personally reviewed pertinent imaging studies  Xr Chest Portable  Result Date: 9/20/2020  Impression: No acute cardiopulmonary disease  Workstation performed: [id number]     Ct Head Without Contrast  Result Date: 9/20/2020  Impression: No acute intracranial abnormality  Workstation performed: [id number]     Ct Soft Tissue Neck Wo Contrast  Result Date: 9/20/2020  Impression: No evidence of mass, lymphadenopathy or inflammation in the neck  Workstation performed: [id number]       ASSESSMENT and PLAN:      Nausea/Vomiting  Globus Sensation  - He developed acute nausea/vomiting associated with new onset of vertigo yesterday around 7 AM and then had a globus sensation after several episodes of vomiting  - All of his symptoms have currently resolved, suspect he had acute esophagitis after multiple vomiting episodes causing his globus sensation  - He denies chronic dysphagia so no indication for inpatient EGD  - Continue protonix 40 mg BID and pepcid QHS as this is his home regimen for GERD  - Okay to eat from GI standpoint, if he tolerates this well he can be discharged from a GI standpoint      The patient will be seen by Dr Nikolay Burks  GI will sign off, call with questions

## 2020-09-21 NOTE — ASSESSMENT & PLAN NOTE
Lab Results   Component Value Date    HGBA1C 7 5 (A) 06/18/2020       Recent Labs     09/21/20  0038 09/21/20  0736 09/21/20  1054 09/21/20  1642   POCGLU 182* 136 120 211*       Blood Sugar Average: Last 72 hrs:  (P) 168 2   Continue home Levemir 5 units q a m    Sliding scale insulin

## 2020-09-21 NOTE — ASSESSMENT & PLAN NOTE
Pre renal in etiology likely secondary to volume depletion from nausea and vomiting and poor oral intake  Will provide gentle hydration  Nephrology consult   FARHAT in a m

## 2020-09-21 NOTE — ASSESSMENT & PLAN NOTE
HGB 8 4  Most likely secondary to hemodilution on superimposed anemia of CKD  Will continue to monitor and trend    No obvious signs of bleeding  Daily CBC

## 2020-09-21 NOTE — ASSESSMENT & PLAN NOTE
"3/26/2018       RE: Camille Thompson  [address] [place] [address] [address] [facility] [address]     Dear Colleague,    Thank you for referring your patient, Camille Thompson, to the [facility] at [facility]. Please see a copy of my visit note below.    Patient self referred back to IR clinic with questions/concerns related to prior sclerotherapy treatment for symptomatic venous insufficiency. She is s/p foam sclerotherapy of the right SSV and varicose tributaries on 1/10/2018. She returned to IR for sclero check on 1/31/2018 at which time therapy was deemed complete.  Trapped coagulum release was performed at that time at a site of focal tenderness. She returns today with concerns related to ongoing generalized tenderness over the posterior right calf as well as \"palpable lumps\" under the skin.  She has had no fevers, localized redness or warmth, and no focal severe tenderness. No leg swelling.  She also asks about medial ankle bronzing and how long it will take to go away. No major changes to her medical history since her last IR visit.      /62 (BP Location: Right arm)  Pulse 95  Resp 16  SpO2 97%  Healthy appearing, NAD  Thin patient with thin legs. No LE edema, no bulging varicosities.  No skin wounds/lesions/rashes.   Hard palpable cords under the skin of the posterior/medial calf in the patients area of concern.  Mildly tender to touch.  Subtle hyperpigmentation is present over a few of these palpable abnormalities.     Bedside ultrasound was performed over the palpable abnormalities revealing expected changes of SSV and tributary sclerotherapy.  Palpable findings correspond to closed/thrombosed superficial veins.     A/P: Patient with expected changes and symptoms of RLE SSV and tributary sclerotherapy. No concerning features.  Patient was reassured of this.  Regarding medial calf/ankle bronzing, she was told that this may slowly improve with time, but will likely never " Lab Results   Component Value Date    HGBA1C 7 5 (A) 06/18/2020       Recent Labs     09/20/20  1631 09/21/20  0038 09/21/20  0736 09/21/20  1054   POCGLU 192* 182* 136 120       Blood Sugar Average: Last 72 hrs:  (P) 157 5 return to baseline. All of questions were answered to her satisfaction. She will contact Dr. Clem pop if any other concerns arise.     I spent a total of 15 minutes with this patient, of which greater than 50% was spent counseling.       Again, thank you for allowing me to participate in the care of your patient.      Sincerely,    Rahul Celaya MD

## 2020-09-21 NOTE — ASSESSMENT & PLAN NOTE
Pre renal in etiology likely secondary to volume depletion from nausea and vomiting and poor oral intake  Received IVF NS 50 cc/hour with significant improvement in creatinine level  Nephrology following    Will continue to monitor with daily BMP

## 2020-09-21 NOTE — CONSULTS
NEPHROLOGY CONSULTATION NOTE    Patient: Kirstin Braun               Sex: male          DOA: 9/20/2020  4:08 PM   YOB: 1940        Age:  79 y o         LOS:  LOS: 0 days     REFERRING PHYSICIAN: Dr Desirae Almendarez / CONSULTATION:  Chronic kidney disease stage 5    DATE OF CONSULTATION / SERVICE: 9/21/2020    ADMISSION DIAGNOSIS: Esophageal dysphagia     CHIEF COMPLAINT     Nausea and vomiting    HPI     This is a 79-year-old male with past medical history of chronic kidney disease stage 5, hypertension, diabetes mellitus type 2, CAD, BPH status post chronic Haile catheter placement, degenerative joint disease, metastatic neuroendocrine cancer who presents for facility with nausea and vomiting that started 1 day ago  Associated complaints included dizziness and feeling of something stuck in the throat  Also complains of difficulty swallowing  Patient denies any metallic taste in the mouth, tremors, hiccups, decreased urination, chest pain, shortness of breath     Patient has prior history of advanced CKD and sees a Nephrology provider in the [facility]         PAST MEDICAL HISTORY     Past Medical History:   Diagnosis Date    Acute pancreatitis without infection or necrosis 9/26/2019    Anemia of chronic renal failure     BPH (benign prostatic hyperplasia)     Chronic kidney disease (CKD), stage IV (severe)     Coronary artery disease     DJD (degenerative joint disease)     Essential hypertension     Gait disturbance     Generalized weakness     GERD (gastroesophageal reflux disease)     Gout     History of transfusion     Hydronephrosis     Hyperlipidemia     Hypertension     Liver cancer     Pressure injury of skin     Proteinuria     Renal disorder     Retention of urine     Thrombophlebitis migrans     Type 2 diabetes mellitus        PAST SURGICAL HISTORY     Past Surgical History:   Procedure Laterality Date    CHOLECYSTECTOMY      CHOLECYSTECTOMY LAPAROSCOPIC N/A 2/7/2020    Procedure: CHOLECYSTECTOMY LAPAROSCOPIC;  Surgeon:  Iron Swartz MD;  Location: [facility] MAIN OR;  Service: General    CORONARY ANGIOPLASTY WITH STENT PLACEMENT      FL RETROGRADE PYELOGRAM  5/21/2020    IR BIOPSY LIVER MASS  1/24/2019    IR CHOLECYSTOSTOMY TUBE PLACEMENT  9/6/2019    TX CYSTO/URETERO W/LITHOTRIPSY &INDWELL STENT INSRT Left 5/21/2020    Procedure: CYSTOSCOPY URETEROSCOPY WITH LITHOTRIPSY HOLMIUM LASER, RETROGRADE PYELOGRAM AND INSERTION STENT URETERAL;  Surgeon: Lazaro Chan MD;  Location: [facility] MAIN OR;  Service: Urology    TX CYSTOURETHROSCOPY,URETER CATHETER Left 4/21/2020    Procedure: CYSTOSCOPY WITH INSERTION STENT URETERAL;  Surgeon: Amber Huerta MD;  Location: [facility];  Service: Urology       ALLERGIES     No Known Allergies    SOCIAL HISTORY     Social History     Substance and Sexual Activity   Alcohol Use Not Currently    Alcohol/week: 0 0 standard drinks    Frequency: Never     Social History     Substance and Sexual Activity   Drug Use Never     Social History     Tobacco Use   Smoking Status Never Smoker   Smokeless Tobacco Never Used       FAMILY HISTORY     Family History   Problem Relation Age of Onset    Cancer Mother     Hypertension Father     Cancer Brother     Cancer Maternal Grandmother     Cancer Paternal Aunt        CURRENT MEDICATIONS       Current Facility-Administered Medications:     acetaminophen (TYLENOL) tablet 650 mg, 650 mg, Oral, Q6H PRN, Toño Spear MD    heparin (porcine) subcutaneous injection 5,000 Units, 5,000 Units, Subcutaneous, Q8H [address], 5,000 Units at 09/21/20 0528 **AND** [CANCELED] Platelet count, , , Once, Toño Spear MD    insulin detemir (LEVEMIR) subcutaneous injection 5 Units, 5 Units, Subcutaneous, Daily Before Breakfast, Toño Spear MD, 5 Units at 09/21/20 0949    insulin lispro (HumaLOG) 100 units/mL subcutaneous injection 1-5 Units, 1-5 Units, Subcutaneous, Q6H [address] **AND** Fingerstick Glucose (POCT), , , Q6H, Toño Spear MD    insulin lispro (HumaLOG) 100 units/mL subcutaneous injection 4 Units, 4 Units, Subcutaneous, TID AC, Toño Spear MD    Labetalol HCl (NORMODYNE) injection 10 mg, 10 mg, Intravenous, Q4H PRN, Toño Spear MD    NIFEdipine (PROCARDIA XL) 24 hr tablet 60 mg, 60 mg, Oral, Daily, Toño Spear MD, 60 mg at 09/21/20 0949    ondansetron (ZOFRAN) injection 4 mg, 4 mg, Intravenous, Q6H PRN, Toño Spear MD    pantoprazole (PROTONIX) injection 40 mg, 40 mg, Intravenous, Q12H [address], Toño Spear MD, 40 mg at 09/21/20 0949    REVIEW OF SYSTEMS     Review of Systems   Constitutional: Negative  HENT: Negative  Eyes: Negative  Respiratory: Negative  Cardiovascular: Negative  Gastrointestinal: Negative  Endocrine: Negative  Genitourinary: Negative  Musculoskeletal: Negative  Skin: Negative  Allergic/Immunologic: Negative  Neurological: Negative  Hematological: Negative  All other systems reviewed and are negative  OBJECTIVE     Current Weight: Weight - Scale: 82 kg (180 lb 12 4 oz)  Vitals:    09/21/20 0756   BP: 156/79   Pulse: 73   Resp:    Temp: (!) 97 4 °F (36 3 °C)   SpO2: 95%     Body mass index is 27 49 kg/m²  Intake/Output Summary (Last 24 hours) at 9/21/2020 1146  Last data filed at 9/21/2020 0952  Gross per 24 hour   Intake 0 ml   Output 1600 ml   Net -1600 ml       PHYSICAL EXAMINATION     Physical Exam  HENT:      Head: Normocephalic and atraumatic  Eyes:      Pupils: Pupils are equal, round, and reactive to light  Neck:      Musculoskeletal: Neck supple  Vascular: No JVD  Cardiovascular:      Rate and Rhythm: Normal rate and regular rhythm  Heart sounds: Murmur present  No friction rub  Pulmonary:      Effort: Pulmonary effort is normal       Breath sounds: Normal breath sounds  Abdominal:      General: Bowel sounds are normal  There is no distension  Palpations: Abdomen is soft  Tenderness: There is no abdominal tenderness  There is no rebound  Musculoskeletal:         General: No tenderness  Skin:     General: Skin is dry  Findings: No rash  Neurological:      Mental Status: He is alert and oriented to person, place, and time  LAB RESULTS        Results from last 7 days   Lab Units 09/21/20  0545 09/20/20  1630   WBC Thousand/uL 7 34 10 32*   HEMOGLOBIN g/dL 8 4* 11 2*   HEMATOCRIT % 26 2* 34 3*   PLATELETS Thousands/uL 128* 179   POTASSIUM mmol/L 4 0 3 9   CHLORIDE mmol/L 106 101   CO2 mmol/L 25 24   BUN mg/dL 45* 46*   CREATININE mg/dL 4 40* 4 72*   EGFR ml/min/1 73sq m 12 11   CALCIUM mg/dL 8 3 9 0       I have personally reviewed the old medical records and patient's previously known baseline creatinine level is ~ 3 5-4 3    RADIOLOGY RESULTS     Results for orders placed during the hospital encounter of 09/20/20   XR chest portable    Narrative CHEST     INDICATION:   r/o pneumonia  COMPARISON:  Chest radiograph from 8/6/2020 and abdomen CT from 8/9/2020  EXAM PERFORMED/VIEWS:  XR CHEST PORTABLE      FINDINGS:    Cardiomediastinal silhouette appears unremarkable  The lungs are clear  No pneumothorax or pleural effusion  Osseous structures appear within normal limits for patient age  Impression No acute cardiopulmonary disease  Workstation performed: [id number]       Results for orders placed during the hospital encounter of 04/21/20   XR chest pa & lateral    Narrative CHEST     INDICATION:   questionable aspiration event  COMPARISON:  One view chest 4/21/2020    EXAM PERFORMED/VIEWS:  XR CHEST PA & LATERAL      FINDINGS:    Normal cardiac silhouette  Aortic calcification is present  Minimal residual albeit decreased left basilar subsegmental atelectasis  No pneumothorax, pulmonary edema, or gross pleural effusion  Degenerative changes bilateral shoulders  Partially visualized left nephroureteral stent        Impression Minimal residual albeit decreased left basilar subsegmental atelectasis  No other significant interval change  Workstation performed: [id number]       Results for orders placed during the hospital encounter of 01/10/20   CT chest without contrast    Narrative CT CHEST WITHOUT IV CONTRAST    INDICATION:   rule out pneumonia  COMPARISON:  April 2019    TECHNIQUE: CT examination of the chest was performed without intravenous contrast   Axial, sagittal, and coronal 2D reformatted images were created from the source data and submitted for interpretation  Radiation dose length product (DLP) for this visit:  302 8 mGy-cm   This examination, like all CT scans performed in the [facility], was performed utilizing techniques to minimize radiation dose exposure, including the use of iterative   reconstruction and automated exposure control  FINDINGS:    LUNGS:  There is no acute pulmonary infiltrate or suspicious mass lesion  Single tiny, 3 x 2 mm janneth-fissural nodule noted in the left medial apex adjacent to major fissure appearing stable  There is no tracheal or endobronchial lesion  PLEURA:  Unremarkable  HEART/GREAT VESSELS:  Ascending aortic aneurysmal dilatation of 45 mm  Minimally increased compared to prior when measured in a similar fashion, 43 mm previously  Bovine arch configuration noted  Heavy calcified coronary artery atherosclerosis  MEDIASTINUM AND NEAL:  Unremarkable  CHEST WALL AND LOWER NECK:   Mild relative elevation of the left hemidiaphragm, stable compared to January 2019  VISUALIZED STRUCTURES IN THE UPPER ABDOMEN:  Percutaneous pigtail catheter noted, through the right lobe the liver, residing in the gallbladder       OSSEOUS STRUCTURES:  No acute fracture or destructive osseous lesion  Impression Lungs appear clear  No evidence for pneumonia  Ascending aortic aneurysmal dilatation as above  Heavy calcified coronary artery disease  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  Workstation performed: [id number]       No results found for this or any previous visit  Results for orders placed during the hospital encounter of 08/06/20   CT abdomen pelvis wo contrast    Narrative CT ABDOMEN AND PELVIS WITHOUT IV CONTRAST    INDICATION:   Abdominal pain, acute, nonlocalized  COMPARISON:  CT chest abdomen pelvis 4/21/2020    TECHNIQUE:  CT examination of the abdomen and pelvis was performed without intravenous contrast   Axial, sagittal, and coronal 2D reformatted images were created from the source data and submitted for interpretation  Radiation dose length product (DLP) for this visit:  531 mGy-cm   This examination, like all CT scans performed in the [facility], was performed utilizing techniques to minimize radiation dose exposure, including the use of iterative   reconstruction and automated exposure control  Enteric contrast was administered  FINDINGS:    ABDOMEN    LOWER CHEST:  There are small bilateral pleural effusions and adjacent compressive atelectasis  LIVER/BILIARY TREE:  Scattered low-attenuation masses are redemonstrated throughout the liver in keeping with history of metastases  Evaluation is limited by lack of intravenous contrast however extent of disease appears similar to prior exam most   prominent in the posterior right lobe the liver  GALLBLADDER:  Gallbladder is surgically absent  SPLEEN:  Unremarkable  PANCREAS:  Unremarkable  ADRENAL GLANDS:  Unremarkable  KIDNEYS/URETERS:   Kidneys are atrophic  No obstructive uropathy  STOMACH AND BOWEL: There is reflux of contrast into the distal esophagus  There is no evidence of small bowel obstruction with contrast passing through into the colon  There are scattered colonic diverticula without evidence of acute diverticulitis      There is bowel wall thickening of loops of small bowel in the lower central and right lower quadrant of the abdomen as well as mild bowel wall thickening of the ascending colon  APPENDIX:  No findings to suggest appendicitis  ABDOMINOPELVIC CAVITY:  There is a small amount of free fluid within the right lower quadrant of the abdomen and pelvis as well as in the right paracolic gutter as well as about the spleen and liver  No evidence of free intra-abdominal air  5 5 x 3 4 x   4 4 cm partially calcified mesenteric mass is redemonstrated similar in appearance to prior exam   There are prominent adjacent surrounding mesenteric lymph nodes as well as prominent para-aortic and lymph nodes in the gastrohepatic region measuring up   to 12 mm short axis diameter (coronal image 76)  Stable small calcified nodes in the left lower quadrant  VESSELS:  Atherosclerotic changes are present  No evidence of aneurysm  PELVIS    REPRODUCTIVE ORGANS:  Enlarged prostate gland    URINARY BLADDER:  The bladder is decompressed by a Haile catheter  ABDOMINAL WALL/INGUINAL REGIONS:  Unremarkable  OSSEOUS STRUCTURES:  No acute fracture or destructive osseous lesion  Impression 1  Bowel wall thickening of loops of small bowel in the lower central and right lower quadrant of the abdomen as well as mild bowel wall thickening of the ascending colon  Findings are suspicious for enterocolitis  There is no evidence of bowel   obstruction  2   There is a small amount of ascites throughout the abdomen and pelvis  3   Similar appearance of the calcified central mesenteric mass at oh (carcinoid) and adjacent prominent lymph nodes  4   Stable heterogeneous hypodense masses throughout the liver similar to prior exams in keeping with history of metastases  5   Small bilateral pleural effusions and adjacent compressive atelectasis  6   Reflux of contrast into the distal esophagus  Correlate clinically for reflux esophagitis      Workstation performed: [id number]       No results found for this or any previous visit  PLAN / RECOMMENDATIONS      79-year-old male with past medical history of metastatic neuroendocrine cancer, hypertension, diabetes mellitus, chronic kidney disease stage 5 who presents to our facility with nausea vomiting x1 day  1  Chronic kidney disease stage 5:  Baseline serum creatinine has been between 3 5-4 3 over the past few months   -presents for facility with a serum creatinine of 4 72 and elevated  -started on normal saline at 50 cc/hour with improvement serum creatinine noted up to 4 4     2  Nausea and vomiting:  Started only 1 day ago   -unsure these are uremic symptoms versus viral gastroenteritis  -will continue to assess for improvement versus worsening of the symptoms  3  Hypertension due to CKD stage 5:  Blood pressure within acceptable range at 867 Systolic   -currently on nifedipine 60 mg p o  Daily  4  Anemia due to CKD:  Hemoglobin 8 4 and which has dropped since yesterday from 11 2 likely in the setting of hemodilution  5  Secondary hyperparathyroidism of renal origin:  Most recent parathyroid hormone intact of 166 and within target for his level of CKD  6  Insulin-dependent diabetes mellitus:  Noted to be on Levemir insulin as well as hemoglobin blood sugars within acceptable range this morning  Thank you for the consultation to participate in patient's care  I have personally discussed my plan with the referring physician       Josafat Wesley MD    9/21/2020

## 2020-09-21 NOTE — PLAN OF CARE
Problem: Potential for Falls  Goal: Patient will remain free of falls  Description: INTERVENTIONS:  - Assess patient frequently for physical needs  -  Identify cognitive and physical deficits and behaviors that affect risk of falls  -  Disputanta fall precautions as indicated by assessment   - Educate patient/family on patient safety including physical limitations  - Instruct patient to call for assistance with activity based on assessment  - Modify environment to reduce risk of injury  - Consider OT/PT consult to assist with strengthening/mobility  Outcome: Progressing     Problem: PAIN - ADULT  Goal: Verbalizes/displays adequate comfort level or baseline comfort level  Description: Interventions:  - Encourage patient to monitor pain and request assistance  - Assess pain using appropriate pain scale  - Administer analgesics based on type and severity of pain and evaluate response  - Implement non-pharmacological measures as appropriate and evaluate response  - Consider cultural and social influences on pain and pain management  - Notify physician/advanced practitioner if interventions unsuccessful or patient reports new pain  Outcome: Progressing     Problem: SAFETY ADULT  Goal: Patient will remain free of falls  Description: INTERVENTIONS:  - Assess patient frequently for physical needs  -  Identify cognitive and physical deficits and behaviors that affect risk of falls    -  Disput[place] fall precautions as indicated by assessment   - Educate patient/family on patient safety including physical limitations  - Instruct patient to call for assistance with activity based on assessment  - Modify environment to reduce risk of injury  - Consider OT/PT consult to assist with strengthening/mobility  Outcome: Progressing     Problem: DISCHARGE PLANNING  Goal: Discharge to home or other facility with appropriate resources  Description: INTERVENTIONS:  - Identify barriers to discharge w/patient and caregiver  - Arrange for needed discharge resources and transportation as appropriate  - Identify discharge learning needs (meds, wound care, etc )  - Arrange for interpretive services to assist at discharge as needed  - Refer to Case Management Department for coordinating discharge planning if the patient needs post-hospital services based on physician/advanced practitioner order or complex needs related to functional status, cognitive ability, or social support system  Outcome: Progressing     Problem: Knowledge Deficit  Goal: Patient/family/caregiver demonstrates understanding of disease process, treatment plan, medications, and discharge instructions  Description: Complete learning assessment and assess knowledge base    Interventions:  - Provide teaching at level of understanding  - Provide teaching via preferred learning methods  Outcome: Progressing     Problem: GASTROINTESTINAL - ADULT  Goal: Minimal or absence of nausea and/or vomiting  Description: INTERVENTIONS:  - Administer IV fluids if ordered to ensure adequate hydration  - Maintain NPO status until nausea and vomiting are resolved  - Nasogastric tube if ordered  - Administer ordered antiemetic medications as needed  - Provide nonpharmacologic comfort measures as appropriate  - Advance diet as tolerated, if ordered  - Consider nutrition services referral to assist patient with adequate nutrition and appropriate food choices  Outcome: Progressing  Goal: Maintains or returns to baseline bowel function  Description: INTERVENTIONS:  - Assess bowel function  - Encourage oral fluids to ensure adequate hydration  - Administer IV fluids if ordered to ensure adequate hydration  - Administer ordered medications as needed  - Encourage mobilization and activity  - Consider nutritional services referral to assist patient with adequate nutrition and appropriate food choices  Outcome: Progressing  Goal: Maintains adequate nutritional intake  Description: INTERVENTIONS:  - Monitor percentage of each meal consumed  - Identify factors contributing to decreased intake, treat as appropriate  - Assist with meals as needed  - Monitor I&O, weight, and lab values if indicated  - Obtain nutrition services referral as needed  Outcome: Progressing

## 2020-09-21 NOTE — ED NOTES
1  CC- Dizziness, N/V     2  Orientation status- AOX4     3  Abnormal labs/ focused assessment/vitals- See labs     4  Medications/drips- 500ml NS bolus IV, Rocephin 1g IV, Valium 2 5mg IV @ 1840, Zofran 4mg IV, Glucagon 1mg IV     5  Narcotic time/ pain medications- Fentanyl 25mcg IV @ 1759    6  IV lines/drains/etc  20G LAC, 22G right hand     7  Isolation status- Negative COVID19    8  Skin- Did not assess    9  Ambulation status- FALL risk     10   ED phone number- [phone], RN  09/20/20 2028

## 2020-09-21 NOTE — PLAN OF CARE
Problem: PHYSICAL THERAPY ADULT  Goal: Performs mobility at highest level of function for planned discharge setting  See evaluation for individualized goals  Description: Treatment/Interventions: LE strengthening/ROM, Functional transfer training, Endurance training, Therapeutic exercise, Patient/family training, Equipment eval/education, Bed mobility, Gait training, Spoke to nursing, Spoke to case management, OT, Continued evaluation  Equipment Recommended: (TBD)       See flowsheet documentation for full assessment, interventions and recommendations  Note: Prognosis: Fair  Problem List: Decreased strength, Decreased endurance, Impaired balance, Decreased mobility, Orthopedic restrictions, Decreased cognition  Assessment: Pt is 79 y o  male seen for PT evaluation on 9/21/2020 s/p admit to [facility] on 9/20/2020 w/ Esophageal dysphagia  Pt presents c nausea, vomiting, and dizziness  PT consulted to assess pt's functional mobility and d/c needs  Order placed for PT eval and tx, w/ activity as tolerated order  Performed at least 2 patient identifiers during session: Name and wristband  Comorbidities affecting pt's physical performance at time of assessment include: acute pancreatitis, anemia of chronic renal failure, BPH, CKD stage 4, CAD, DJD, essential HTN, GERD, gait disturbance, generalized weakness, gout, HLD, hydronephrosis, HTN, liver cancer, proteinuria, renal disorder, retention of urine, thrombophlebitis migrans, type 2 DM  PTA, pt was independent w/ all functional mobility w/ rollator in community, ambulates community distances and elevations, ambulates household distances, has 0 / ramp BETH, lives w/ S O  in 1 level home and retired   Personal factors affecting pt at time of IE include: inaccessible home environment, inability to ambulate household distances, inability to navigate level surfaces w/o external assistance, limited home support, positive fall history and decreased initiation and engagement  Please find objective findings from PT assessment regarding body systems outlined above with impairments and limitations including weakness, impaired balance, decreased endurance, gait deviations, decreased activity tolerance, fall risk and orthopedic restrictions, as well as mobility assessment (need for cueing for mobility technique)  The following objective measures performed on IE also reveal limitations: Barthel Index: 30/100 and Modified Shelton: 4 (moderate/severe disability)  Pt's clinical presentation is currently unstable/unpredictable seen in pt's presentation of abnormal lab value(s), need for input for task focus and mobility technique and ongoing medical assessment  Pt to benefit from continued PT tx to address deficits as defined above and maximize level of functional independent mobility and consistency  From PT/mobility standpoint, recommendation at time of d/c would be STR pending progress in order to facilitate return to PLOF  Barriers to Discharge: Inaccessible home environment, Decreased caregiver support     PT Discharge Recommendation: [address]     PT - OK to Discharge: Yes(when medically cleared if to STR)    See flowsheet documentation for full assessment

## 2020-09-21 NOTE — PROGRESS NOTES
Progress Note - Brianna Cuevas 1940, 79 y o  male MRN: [id number]    Unit/Bed#: -01 Encounter: [id number]    Primary Care Provider: RIKA Atkinson   Date and time admitted to hospital: 9/20/2020  4:08 PM        * Esophageal dysphagia  Assessment & Plan  Presented with difficulty swallowing and foreign body sensation in throat  With associated nausea and vomiting  GI consulted, appreciate input  Zofran for nausea    Anemia due to chronic kidney disease  Assessment & Plan  HGB 8 4  Most likely secondary to hemodilution on superimposed anemia of CKD  Will continue to monitor and trend  No obvious signs of bleeding  Daily CBC    Acute renal failure superimposed on stage 4 chronic kidney disease ([facility] [address] )  Assessment & Plan  Pre renal in etiology likely secondary to volume depletion from nausea and vomiting and poor oral intake  Received IVF NS 50 cc/hour with significant improvement in creatinine level  Nephrology following  Will continue to monitor with daily BMP      Gastroesophageal reflux disease without esophagitis  Assessment & Plan  Continue PPI    Diastolic dysfunction  Assessment & Plan  Currently euvolemic  Hold diuretics in the setting of SILVERIO    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 [facility])  Assessment & Plan  Follows with oncology in the outpatient basis  Receives chemo monthly  If discharged tomorrow, is advised to follow-up with oncology tomorrow  Essential hypertension  Assessment & Plan  Continue nifedipine and metoprolol    Type 2 diabetes mellitus with hyperglycemia, with long-term current use of insulin [facility])  Assessment & Plan  Lab Results   Component Value Date    HGBA1C 7 5 (A) 06/18/2020       Recent Labs     09/21/20  0038 09/21/20  0736 09/21/20  1054 09/21/20  1642   POCGLU 182* 136 120 211*       Blood Sugar Average: Last 72 hrs:  (P) 168 2   Continue home Levemir 5 units q a m    Sliding scale insulin      VTE Pharmacologic Prophylaxis:   Pharmacologic: Heparin  Mechanical VTE Prophylaxis in Place: Yes    Discussions with Specialists or Other Care Team Provider:  Nephrology, GI, neurology and PT OT    Education and Discussions with Family / Patient: Not at this time    Current Length of Stay: 0 day(s)    Current Patient Status: Observation     Discharge Plan / Estimated Discharge Date:  2020    Code Status: Level 1 - Full Code      Subjective:   Patient seen and examined by me at bedside  Patient reports improvement in dysphagia symptoms  Denies odynophagia and vomiting since admission  Reports dizziness prior to nausea and vomiting yesterday  Neurology consulted    MRI brain was done showed no signs of acute intracranial pathology  Objective:     Vitals:   Temp (24hrs), Av 6 °F (36 4 °C), Min:97 4 °F (36 3 °C), Max:97 8 °F (36 6 °C)    Temp:  [97 4 °F (36 3 °C)-97 8 °F (36 6 °C)] 97 4 °F (36 3 °C)  HR:  [] 74  Resp:  [12-20] 18  BP: (130-175)/(62-82) 130/79  SpO2:  [93 %-99 %] 94 %  Body mass index is 27 49 kg/m²  Input and Output Summary (last 24 hours): Intake/Output Summary (Last 24 hours) at 2020 1714  Last data filed at 2020 1300  Gross per 24 hour   Intake 360 ml   Output 1600 ml   Net -1240 ml       Physical Exam:     Physical Exam  Vitals signs reviewed  Constitutional:       General: He is not in acute distress  Appearance: Normal appearance  He is not toxic-appearing  HENT:      Head: Normocephalic  Mouth/Throat:      Mouth: Mucous membranes are moist    Cardiovascular:      Rate and Rhythm: Normal rate  Pulses: Normal pulses  Heart sounds: Normal heart sounds  Pulmonary:      Effort: Pulmonary effort is normal       Breath sounds: Normal breath sounds  No stridor  No wheezing  Abdominal:      General: Bowel sounds are normal  There is no distension  Palpations: Abdomen is soft  Tenderness: There is no abdominal tenderness   There is no right CVA tenderness or left CVA tenderness  Musculoskeletal:      Right lower leg: No edema  Left lower leg: No edema  Neurological:      General: No focal deficit present  Mental Status: He is alert and oriented to person, place, and time  Mental status is at baseline  Additional Data:     Labs:    Results from last 7 days   Lab Units 09/21/20  0545   WBC Thousand/uL 7 34   HEMOGLOBIN g/dL 8 4*   HEMATOCRIT % 26 2*   PLATELETS Thousands/uL 128*   NEUTROS PCT % 74   LYMPHS PCT % 14   MONOS PCT % 10   EOS PCT % 0     Results from last 7 days   Lab Units 09/21/20  0545 09/20/20  1630   POTASSIUM mmol/L 4 0 3 9   CHLORIDE mmol/L 106 101   CO2 mmol/L 25 24   BUN mg/dL 45* 46*   CREATININE mg/dL 4 40* 4 72*   CALCIUM mg/dL 8 3 9 0   ALK PHOS U/L  --  156*   ALT U/L  --  26   AST U/L  --  16     Results from last 7 days   Lab Units 09/20/20  1630   INR  0 98       * I Have Reviewed All Lab Data Listed Above  * Additional Pertinent Lab Tests Reviewed:  L[address] 65 Admission Reviewed    Imaging:    Imaging Reports Reviewed Today Include:  MRI brain  Imaging Personally Reviewed by Myself Includes:  MRI brain    Recent Cultures (last 7 days):           Last 24 Hours Medication List:   Current Facility-Administered Medications   Medication Dose Route Frequency Provider Last Rate    acetaminophen  650 mg Oral Q6H PRN Toño Spear MD      aspirin  81 mg Oral Daily Eldon Jones PA-C      heparin (porcine)  5,000 Units Subcutaneous Q8H [address] Toño Spear MD      insulin detemir  5 Units Subcutaneous Daily Before Breakfast Toño Spear MD      insulin lispro  1-5 Units Subcutaneous Q6H [address] Toño Spear MD      insulin lispro  4 Units Subcutaneous TID AC Toño Spear MD      Labetalol HCl  10 mg Intravenous Q4H PRN Toño Spear MD      NIFEdipine ER  60 mg Oral Daily Toño Spear MD      ondansetron  4 mg Intravenous Q6H PRN Toño Spear MD      pantoprazole  40 mg Intravenous Q12H Jl Finnegan MD Today, Patient Was Seen By: Gem Norris MD    ** Please Note: This note has been constructed using a voice recognition system   **

## 2020-09-21 NOTE — ASSESSMENT & PLAN NOTE
Presented with difficulty swallowing and foreign body sensation in throat  With associated nausea and vomiting  GI consulted, appreciate input    Zofran for nausea

## 2020-09-21 NOTE — PLAN OF CARE
Problem: OCCUPATIONAL THERAPY ADULT  Goal: Performs self-care activities at highest level of function for planned discharge setting  See evaluation for individualized goals  Description: Treatment Interventions: ADL retraining, Functional transfer training, UE strengthening/ROM, Endurance training, Cognitive reorientation, Patient/family training, Equipment evaluation/education, Fine motor coordination activities, Compensatory technique education, Activityengagement, Energy conservation, Continued evaluation          See flowsheet documentation for full assessment, interventions and recommendations  Note: Limitation: Decreased ADL status, Decreased UE ROM, Decreased UE strength, Decreased Safe judgement during ADL, Decreased cognition, Decreased endurance, Decreased fine motor control, Decreased self-care trans, Decreased high-level ADLs  Prognosis: Fair  Assessment: Patient is a 79 y o  male seen for OT evaluation s/p admit to [id number] [facility] on 9/20/2020 w/Esophageal dysphagia  Commorbidities affecting patient's functional performance at time of assessment include: esophageal dysphagia, acute renal failure superimposed on stage 4 CKD, gastroesophageal reflux disease without esophagitis, diastolic dysfunction, 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, essential HTN, and type 2 DM with hyperglycemia with long-term current use of insulin  Orders placed for OT evaluation and treatment  Performed at least two patient identifiers during session including name and wristband  Prior to admission, patient was living with his significant other in a one-story home with a ramped entrance  Patient reports that at baseline, he requires assistance with both ADLs and IADLs  Personal factors affecting patient at time of initial evaluation include: limited insight into deficits, decreased initiation and engagement, difficulty performing ADLs and difficulty performing IADLs   Upon evaluation, patient requires moderate assist for UB ADLs, maximal assist for LB ADLs, transfers and functional ambulation in room and bathroom with moderate assist x2, with HHA  Patient kept NWB for LUE and sling donned and in place throughout evaluation  Patient is alert and oriented x 4, presents with limited ability to recall information after a brief period of time (short term memory) / working memory   Occupational performance is affected by the following deficits: decreased functional use of BUEs, limited active ROM, decreased muscle strength, impaired gross motor coordination, impaired fine motor coordination, dynamic sit/ stand balance deficit with poor standing tolerance time for self care and functional mobility, decreased activity tolerance, impaired memory, decreased safety awareness, decreased coping skills and postural control and postural alignment deficit, requiring external assistance to complete transitional movements  Therapist completed  extensive additional review of medical records and additional review of physical, cognitive or psychosocial history, clinical examination identifying 5 or more performance deficits, clinical decision making of a high complexity , consistent with a high complexity level evaluation  Patient to benefit from continued Occupational Therapy treatment while in the hospital to address deficits as defined above and maximize level of functional independence with ADLs and functional mobility   Occupational     OT Discharge Recommendation: Post-Acute Rehabilitation Services  OT - OK to Discharge: Yes(once medically cleared to rehab)

## 2020-09-21 NOTE — ASSESSMENT & PLAN NOTE
Lab Results   Component Value Date    HGBA1C 7 5 (A) 06/18/2020       Recent Labs     09/20/20  1631   POCGLU 192*       Blood Sugar Average: Last 72 hrs:  (P) 192   Continue home Levemir 5 units q a m    Sliding scale insulin

## 2020-09-21 NOTE — ASSESSMENT & PLAN NOTE
Per discussion with GI, strong suspicion for acute esophagitis in the setting of multiple episodes of emesis  GI following

## 2020-09-21 NOTE — ASSESSMENT & PLAN NOTE
Presented with difficulty swallowing and foreign body sensation in throat  With associated nausea and vomiting  Currently tolerating on secretions are no emergent need to do EGD  Will consult GI for possible EGD tomorrow  Will keep NPO  Zofran for nausea

## 2020-09-21 NOTE — ED PROVIDER NOTES
History  Chief Complaint   Patient presents with    Dizziness     pt presenys via EMS for c/o dizziness and vomiting since this morning  Pt has hx cancer and diabetes  Received 4 mg zofran en route, still vomiting on arrival      Vomiting     78 y/o male, hx of multiple comorbidites including CKD, DM, HTN, CAD, and urinary retention with chronic indwelling garcia, presents to the ED for dizziness and n/v since this morning  Patient states that around 7am he developed room spinning dizziness  States that position changes worsen the symptoms  He reports that he is unable to ambulate 2/2 symptoms and feeling off balance  He states that he has associated nausea and multiple episodes of vomiting  Reports that now he feels like something is stuck in his throat  He denies any recent falls, abd pain, cp, sob, f/c, or recent sick contacts  No hx of similar in the past  Denies any slurred speech, increased confusion, or n/t/w of his extremities  He states that 4 weeks ago he fell and sustained a humerus fracture in his left arm- he states that he has difficulty moving this extremity due to the injury  No other complaints  History provided by:  Patient  Dizziness   Quality:  Room spinning  Severity:  Moderate  Onset quality:  Sudden  Timing:  Constant  Progression:  Worsening  Chronicity:  New  Relieved by:  Being still  Worsened by: Movement  Ineffective treatments:  None tried  Associated symptoms: nausea and vomiting    Associated symptoms: no chest pain, no diarrhea, no headaches, no shortness of breath and no weakness    Vomiting   Associated symptoms: no abdominal pain, no chills, no cough, no diarrhea, no fever, no headaches and no sore throat        Prior to Admission Medications   Prescriptions Last Dose Informant Patient Reported? Taking?    B Complex-C-Folic Acid (TRIPHROCAPS) 1 MG CAPS  Spouse/Significant Other No Yes   Sig: Take 1 capsule (1 mg total) by mouth daily with dinner   BD INSULIN SYRINGE U/F 30G X 1/2" 0 3 ML MISC  Spouse/Significant Other Yes Yes   Insulin Syringe-Needle U-100 30G X 1/2" 1 ML MISC  Spouse/Significant Other No Yes   Sig: by Does not apply route 3 (three) times a day   NIFEdipine ER (ADALAT CC) 60 MG 24 hr tablet  Spouse/Significant Other No No   Sig: Take 1 tablet (60 mg total) by mouth daily   acetaminophen (TYLENOL) 325 mg tablet  Spouse/Significant Other Yes Yes   Sig: Take 650 mg by mouth every 6 (six) hours as needed    aspirin 81 MG tablet  Spouse/Significant Other Yes Yes   Sig: Take 81 mg by mouth daily   cholecalciferol (VITAMIN D3) 1,000 units tablet  Spouse/Significant Other Yes Yes   Sig: Take 1,000 Units by mouth daily   cholestyramine sugar free (QUESTRAN LIGHT) 4 g packet  Spouse/Significant Other No Yes   Sig: Take 1 packet (4 g total) by mouth 2 (two) times a day   colchicine (COLCRYS) 0 6 mg tablet  Spouse/Significant Other No Yes   Sig: Take 1 tablet (0 6 mg total) by mouth daily   famotidine (PEPCID) 10 mg tablet  Spouse/Significant Other No Yes   Sig: Take 1 tablet (10 mg total) by mouth daily at bedtime   furosemide (LASIX) 40 mg tablet  Spouse/Significant Other No Yes   Sig: Take 1 tablet (40 mg total) by mouth daily , do NOT take if you do not have swelling   insulin detemir (Levemir) 100 units/mL subcutaneous injection  Spouse/Significant Other No Yes   Sig: Inject 5 Units under the skin daily before breakfast   insulin lispro (HumaLOG) 100 units/mL injection  Spouse/Significant Other No Yes   Sig: Inject 4 Units under the skin 3 (three) times a day before meals   magnesium oxide (MAG-OX) 400 mg  Spouse/Significant Other No Yes   Sig: Take 1 tablet (400 mg total) by mouth 2 (two) times a day   nystatin (MYCOSTATIN) powder  Spouse/Significant Other No Yes   Sig: Apply topically 2 (two) times a day   pantoprazole (PROTONIX) 40 mg tablet  Spouse/Significant Other No Yes   Sig: Take 1 tablet (40 mg total) by mouth 2 (two) times a day   sodium bicarbonate 650 mg tablet Spouse/Significant Other Yes Yes   Sig: Take 650 mg by mouth 3 (three) times a day before meals   sucralfate (CARAFATE) 1 g/10 mL suspension  Spouse/Significant Other No Yes   Sig: Take 10 mL (1,000 mg total) by mouth every 6 (six) hours   tamsulosin (FLOMAX) 0 4 mg  Spouse/Significant Other No Yes   Sig: Take 1 capsule (0 4 mg total) by mouth daily with dinner      Facility-Administered Medications: None       Past Medical History:   Diagnosis Date    Acute pancreatitis without infection or necrosis 9/26/2019    Anemia of chronic renal failure     BPH (benign prostatic hyperplasia)     Chronic kidney disease (CKD), stage IV (severe)     Coronary artery disease     DJD (degenerative joint disease)     Essential hypertension     Gait disturbance     Generalized weakness     GERD (gastroesophageal reflux disease)     Gout     History of transfusion     Hydronephrosis     Hyperlipidemia     Hypertension     Liver cancer     Pressure injury of skin     Proteinuria     Renal disorder     Retention of urine     Thrombophlebitis migrans     Type 2 diabetes mellitus        Past Surgical History:   Procedure Laterality Date    CHOLECYSTECTOMY      CHOLECYSTECTOMY LAPAROSCOPIC N/A 2/7/2020    Procedure: CHOLECYSTECTOMY LAPAROSCOPIC;  Surgeon:  Rock Victoria MD;  Location: [address] [place] [address] MAIN OR;  Service: General    CORONARY ANGIOPLASTY WITH STENT PLACEMENT      FL RETROGRADE PYELOGRAM  5/21/2020    IR BIOPSY LIVER MASS  1/24/2019    IR CHOLECYSTOSTOMY TUBE PLACEMENT  9/6/2019    NV CYSTO/URETERO W/LITHOTRIPSY &INDWELL STENT INSRT Left 5/21/2020    Procedure: CYSTOSCOPY URETEROSCOPY WITH LITHOTRIPSY HOLMIUM LASER, RETROGRADE PYELOGRAM AND INSERTION STENT URETERAL;  Surgeon: Mono Mccracken MD;  Location: [facility];  Service: Urology    NV CYSTOURETHROSCOPY,URETER CATHETER Left 4/21/2020    Procedure: CYSTOSCOPY WITH INSERTION STENT URETERAL;  Surgeon: Kenzie Wallace MD;  Location: [facility]; Service: Urology       Family History   Problem Relation Age of Onset    Cancer Mother     Hypertension Father     Cancer Brother     Cancer Maternal Grandmother     Cancer Paternal Aunt      I have reviewed and agree with the history as documented  E-Cigarette/Vaping    E-Cigarette Use Never User      E-Cigarette/Vaping Substances     Social History     Tobacco Use    Smoking status: Never Smoker    Smokeless tobacco: Never Used   Substance Use Topics    Alcohol use: Not Currently     Alcohol/week: 0 0 standard drinks     Frequency: Never    Drug use: Never       Review of Systems   Constitutional: Negative for chills and fever  HENT: Negative for congestion, ear pain and sore throat  Eyes: Negative for pain and visual disturbance  Respiratory: Negative for cough, shortness of breath and wheezing  Cardiovascular: Negative for chest pain and leg swelling  Gastrointestinal: Positive for nausea and vomiting  Negative for abdominal pain and diarrhea  Genitourinary: Negative for dysuria, frequency, hematuria and urgency  Musculoskeletal: Negative for neck pain and neck stiffness  Skin: Negative for rash and wound  Neurological: Positive for dizziness  Negative for weakness, numbness and headaches  Psychiatric/Behavioral: Negative for agitation and confusion  All other systems reviewed and are negative  Physical Exam  Physical Exam  Vitals signs and nursing note reviewed  Constitutional:       Appearance: He is well-developed  HENT:      Head: Normocephalic and atraumatic  Eyes:      Extraocular Movements: Extraocular movements intact  Conjunctiva/sclera: Conjunctivae normal       Pupils: Pupils are equal, round, and reactive to light  Neck:      Musculoskeletal: Normal range of motion and neck supple  Cardiovascular:      Rate and Rhythm: Normal rate and regular rhythm  Pulmonary:      Effort: Pulmonary effort is normal       Breath sounds: Normal breath sounds  Abdominal:      General: Bowel sounds are normal  There is no distension  Palpations: Abdomen is soft  Tenderness: There is no abdominal tenderness  Musculoskeletal: Normal range of motion  Skin:     General: Skin is warm and dry  Neurological:      General: No focal deficit present  Mental Status: He is alert and oriented to person, place, and time  Comments: Decreased strength in LUE- states that this has been chronic for 4 weeks due to recent humeral fracture  No other deficits noted  No slurred speech or facial droop            Vital Signs  ED Triage Vitals   Temperature Pulse Respirations Blood Pressure SpO2   09/20/20 1745 09/20/20 1620 09/20/20 1620 09/20/20 1620 09/20/20 1620   97 8 °F (36 6 °C) (!) 122 20 152/71 98 %      Temp Source Heart Rate Source Patient Position - Orthostatic VS BP Location FiO2 (%)   09/20/20 1620 09/20/20 1620 09/20/20 1620 09/20/20 1620 --   Oral Monitor Lying Right arm       Pain Score       09/20/20 1745       5           Vitals:    09/20/20 2000 09/20/20 2030 09/20/20 2115 09/21/20 0000   BP: 169/73 168/76 151/82 154/77   Pulse: 87 88 105 84   Patient Position - Orthostatic VS: Lying Lying Lying          Visual Acuity  Visual Acuity      Most Recent Value   L Pupil Size (mm)  3   R Pupil Size (mm)  3          ED Medications  Medications   sodium chloride 0 9 % infusion (50 mL/hr Intravenous New Bag 9/20/20 2125)   acetaminophen (TYLENOL) tablet 650 mg (has no administration in time range)   ondansetron (ZOFRAN) injection 4 mg (has no administration in time range)   heparin (porcine) subcutaneous injection 5,000 Units (5,000 Units Subcutaneous Given 9/21/20 0528)   insulin detemir (LEVEMIR) subcutaneous injection 5 Units (has no administration in time range)   NIFEdipine (PROCARDIA XL) 24 hr tablet 60 mg (has no administration in time range)   insulin lispro (HumaLOG) 100 units/mL subcutaneous injection 4 Units (4 Units Subcutaneous Not Given 9/21/20 8445)   Labetalol HCl (NORMODYNE) injection 10 mg (has no administration in time range)   pantoprazole (PROTONIX) injection 40 mg (40 mg Intravenous Given 9/20/20 2125)   insulin lispro (HumaLOG) 100 units/mL subcutaneous injection 1-5 Units (1 Units Subcutaneous Not Given 9/21/20 0526)   ondansetron (ZOFRAN) injection 4 mg (4 mg Intravenous Given 9/20/20 1634)   glucagon (GLUCAGEN) injection 1 mg (1 mg Intravenous Given 9/20/20 1637)   ondansetron (FOR EMS ONLY) (ZOFRAN) 4 mg/2 mL injection 4 mg (0 mg Does not apply Given to EMS 9/20/20 1657)   fentanyl citrate (PF) 100 MCG/2ML 25 mcg (25 mcg Intravenous Given 9/20/20 1759)   diazepam (VALIUM) injection 2 5 mg (2 5 mg Intravenous Given 9/20/20 1840)   Lidocaine Viscous HCl (XYLOCAINE) 2 % mucosal solution 15 mL (15 mL Swish & Swallow Given 9/20/20 1841)   ceftriaxone (ROCEPHIN) 1 g/50 mL in dextrose IVPB (0 mg Intravenous Stopped 9/20/20 2117)   sodium chloride 0 9 % bolus 500 mL (0 mL Intravenous Stopped 9/21/20 0627)       Diagnostic Studies  Results Reviewed     Procedure Component Value Units Date/Time    Basic metabolic panel [[id number]]  (Abnormal) Collected:  09/21/20 0545    Lab Status:  Final result Specimen:  Blood from Arm, Right Updated:  09/21/20 0615     Sodium 142 mmol/L      Potassium 4 0 mmol/L      Chloride 106 mmol/L      CO2 25 mmol/L      ANION GAP 11 mmol/L      BUN 45 mg/dL      Creatinine 4 40 mg/dL      Glucose 146 mg/dL      Glucose, Fasting 146 mg/dL      Calcium 8 3 mg/dL      eGFR 12 ml/min/1 73sq m     Narrative:       [place] guidelines for Chronic Kidney Disease (CKD):     Stage 1 with normal or high GFR (GFR > 90 mL/min/1 73 square meters)    Stage 2 Mild CKD (GFR = 60-89 mL/min/1 73 square meters)    Stage 3A Moderate CKD (GFR = 45-59 mL/min/1 73 square meters)    Stage 3B Moderate CKD (GFR = 30-44 mL/min/1 73 square meters)    Stage 4 Severe CKD (GFR = 15-29 mL/min/1 73 square meters)    Stage 5 End Stage CKD (GFR <15 mL/min/1 73 square meters)  Note: GFR calculation is accurate only with a steady state creatinine    CBC and differential [[id number]]  (Abnormal) Collected:  09/21/20 0545    Lab Status:  Final result Specimen:  Blood from Arm, Right Updated:  09/21/20 0606     WBC 7 34 Thousand/uL      RBC 2 82 Million/uL      Hemoglobin 8 4 g/dL      Hematocrit 26 2 %      MCV 93 fL      MCH 29 8 pg      MCHC 32 1 g/dL      RDW 13 5 %      MPV 10 9 fL      Platelets 556 Thousands/uL      nRBC 0 /100 WBCs      Neutrophils Relative 74 %      Immat GRANS % 1 %      Lymphocytes Relative 14 %      Monocytes Relative 10 %      Eosinophils Relative 0 %      Basophils Relative 1 %      Neutrophils Absolute 5 46 Thousands/µL      Immature Grans Absolute 0 04 Thousand/uL      Lymphocytes Absolute 1 03 Thousands/µL      Monocytes Absolute 0 75 Thousand/µL      Eosinophils Absolute 0 01 Thousand/µL      Basophils Absolute 0 05 Thousands/µL     Urine Microscopic [[id number]]  (Abnormal) Collected:  09/20/20 1844    Lab Status:  Final result Specimen:  Urine, Indwelling Haile Catheter Updated:  09/20/20 1911     RBC, UA 0-1 /hpf      WBC, UA 20-30 /hpf      Epithelial Cells Occasional /hpf      Bacteria, UA Moderate /hpf      Fine granular casts 0-1 /lpf      AMORPH URATES Moderate /hpf     Urine culture [[id number]] Collected:  09/20/20 1844    Lab Status:   In process Specimen:  Urine, Indwelling Haile Catheter Updated:  09/20/20 1911    UA w Reflex to Microscopic w Reflex to Culture [[id number]]  (Abnormal) Collected:  09/20/20 1844    Lab Status:  Final result Specimen:  Urine, Indwelling Haile Catheter Updated:  09/20/20 1857     Color, UA Yellow     Clarity, UA Cloudy     Specific Gravity, UA 1 025     pH, UA 5 5     Leukocytes, UA Moderate     Nitrite, UA Negative     Protein,  (2+) mg/dl      Glucose,  (1/4%) mg/dl      Ketones, UA Trace mg/dl      Urobilinogen, UA 0 2 E U /dl      Bilirubin, UA Negative Blood, UA Small    Novel Coronavirus [facility] [[id number]]  (Normal) Collected:  09/20/20 1630    Lab Status:  Final result Specimen:  Nares from Nose Updated:  09/20/20 1728     SARS-CoV-2 Negative    Narrative: The specimen collection materials, transport medium, and/or testing methodology utilized in the production of these test results have been proven to be reliable in a limited validation with an abbreviated program under the Emergency Utilization Authorization provided by the FDA  Testing reported as "Presumptive positive" will be confirmed with secondary testing with a reference laboratory to ensure result accuracy  Clinical caution and judgement should be used with the interpretation of these results with consideration of the clinical impression and other laboratory testing  Testing reported as "Positive" or "Negative" has been proven to be accurate according to standard laboratory validation requirements  All testing is performed with control materials showing appropriate reactivity at standard intervals        Troponin I [[id number]]  (Normal) Collected:  09/20/20 1630    Lab Status:  Final result Specimen:  Blood from Arm, Left Updated:  09/20/20 1659     Troponin I <0 02 ng/mL     Comprehensive metabolic panel [[id number]]  (Abnormal) Collected:  09/20/20 1630    Lab Status:  Final result Specimen:  Blood from Arm, Left Updated:  09/20/20 1656     Sodium 139 mmol/L      Potassium 3 9 mmol/L      Chloride 101 mmol/L      CO2 24 mmol/L      ANION GAP 14 mmol/L      BUN 46 mg/dL      Creatinine 4 72 mg/dL      Glucose 210 mg/dL      Calcium 9 0 mg/dL      AST 16 U/L      ALT 26 U/L      Alkaline Phosphatase 156 U/L      Total Protein 7 8 g/dL      Albumin 3 7 g/dL      Total Bilirubin 0 50 mg/dL      eGFR 11 ml/min/1 73sq m     Narrative:       [place] guidelines for Chronic Kidney Disease (CKD):     Stage 1 with normal or high GFR (GFR > 90 mL/min/1 73 square meters)    Stage 2 Mild CKD (GFR = 60-89 mL/min/1 73 square meters)    Stage 3A Moderate CKD (GFR = 45-59 mL/min/1 73 square meters)    Stage 3B Moderate CKD (GFR = 30-44 mL/min/1 73 square meters)    Stage 4 Severe CKD (GFR = 15-29 mL/min/1 73 square meters)    Stage 5 End Stage CKD (GFR <15 mL/min/1 73 square meters)  Note: GFR calculation is accurate only with a steady state creatinine    Protime-INR [[id number]]  (Normal) Collected:  09/20/20 1630    Lab Status:  Final result Specimen:  Blood from Arm, Left Updated:  09/20/20 1649     Protime 13 2 seconds      INR 0 98    APTT [[id number]]  (Normal) Collected:  09/20/20 1630    Lab Status:  Final result Specimen:  Blood from Arm, Left Updated:  09/20/20 1649     PTT 29 seconds     CBC and Platelet [[id number]]  (Abnormal) Collected:  09/20/20 1630    Lab Status:  Final result Specimen:  Blood from Arm, Left Updated:  09/20/20 1639     WBC 10 32 Thousand/uL      RBC 3 71 Million/uL      Hemoglobin 11 2 g/dL      Hematocrit 34 3 %      MCV 93 fL      MCH 30 2 pg      MCHC 32 7 g/dL      RDW 13 6 %      Platelets 364 Thousands/uL      MPV 10 6 fL     Fingerstick Glucose (POCT) [[id number]]  (Abnormal) Collected:  09/20/20 1631    Lab Status:  Final result Updated:  09/20/20 1638     POC Glucose 192 mg/dl                  CT soft tissue neck wo contrast   Final Result by Tram Medina MD (09/20 1938)      No evidence of mass, lymphadenopathy or inflammation in the neck  Workstation performed: [id number]         CT head without contrast   Final Result by Tram Medina MD (09/20 1941)      No acute intracranial abnormality  Workstation performed: [id number]         XR chest portable   Final Result by Talon Alfred MD (09/20 1648)      No acute cardiopulmonary disease              Workstation performed: [id number]                    Procedures  Procedures         ED Course  ED Course as of Sep 21 0746   Sun Sep 20, 2020 1628 Dizziness currently resolved       1737 Unable to receive contrast- will get scans without contrast    eGFR: 11   1840 Spoke with Dr Yogi James - made aware of patient  Pateint still continues to complain of foreign body sensation but is not vomiitng/ spitting up as much on exam  Patient was able to swallow spits of water  Given this, he states that EGD is not emergent  Patient was brought to CT scan but cannot tolerate laying flat for CT scan due to FB sensation  He now complains of a headache  Fentanyl given for headache  Dr Yogi James contacted again but states that there is still no indication for emergent EGD  Patient was then given a small dose of valium  Will attempt CT again when patient more comfortable  1919 CT able to be completed  Identification of Seniors at Risk      Most Recent Value   (ISAR) Identification of Seniors at Risk   Before the illness or injury that brought you to the Emergency, did you need someone to help you on a regular basis? 0 Filed at: 09/20/2020 1617   In the last 24 hours, have you needed more help than usual?  0 Filed at: 09/20/2020 1617   Have you been hospitalized for one or more nights during the past 6 months? 0 Filed at: 09/20/2020 1617   In general, do you see well?  0 Filed at: 09/20/2020 1617   In general, do you have serious problems with your memory? 0 Filed at: 09/20/2020 1617   Do you take more than three different medications every day?  0 Filed at: 09/20/2020 1617   ISAR Score  0 Filed at: 09/20/2020 1617                    SBIRT 20yo+      Most Recent Value   SBIRT (23 yo +)   In order to provide better care to our patients, we are screening all of our patients for alcohol and drug use  Would it be okay to ask you these screening questions? Yes Filed at: 09/20/2020 1618   Initial Alcohol Screen: US AUDIT-C    1  How often do you have a drink containing alcohol?  0 Filed at: 09/20/2020 1618   2   How many drinks containing alcohol do you have on a typical day you are drinking? 0 Filed at: 09/20/2020 1618   3a  Male UNDER 65: How often do you have five or more drinks on one occasion? 0 Filed at: 09/20/2020 1618   3b  FEMALE Any Age, or MALE 65+: How often do you have 4 or more drinks on one occassion? 0 Filed at: 09/20/2020 1618   Audit-C Score  0 Filed at: 09/20/2020 1618   JUAN: How many times in the past year have you    Used an illegal drug or used a prescription medication for non-medical reasons? Never Filed at: 09/20/2020 1618                  MDM  Number of Diagnoses or Management Options  Dizziness: new and requires workup  Nausea and vomiting, intractability of vomiting not specified, unspecified vomiting type: new and requires workup  Diagnosis management comments: patietn witht nausea/ vomiting, and dizziness- will get labs, ct head, ct soft tissue, and give antiemetics for symptom relief  Will admit     Patient reevaluated and feels improved  Patient updated on results of tests and plan of care including admission to hospital for further evaluation of presenting complaint  Patient demonstrates verbal understanding and agrees with plan  Report to Arley FLETCHER for continuation of patient care          Amount and/or Complexity of Data Reviewed  Clinical lab tests: ordered and reviewed  Tests in the radiology section of CPT®: ordered and reviewed  Tests in the medicine section of CPT®: ordered and reviewed  Discussion of test results with the performing providers: yes  Decide to obtain previous medical records or to obtain history from someone other than the patient: yes  Obtain history from someone other than the patient: yes  Review and summarize past medical records: yes  Discuss the patient with other providers: yes  Independent visualization of images, tracings, or specimens: yes    Patient Progress  Patient progress: improved      Disposition  Final diagnoses:   Dizziness   Nausea and vomiting, intractability of vomiting not specified, unspecified vomiting type     Time reflects when diagnosis was documented in both MDM as applicable and the Disposition within this note     Time User Action Codes Description Comment    9/20/2020  8:07 PM Eamon Dumas 89, [address] [R42] Dizziness     9/20/2020  8:08 PM [place], [address] [R11 2] Nausea and vomiting, intractability of vomiting not specified, unspecified vomiting type     9/20/2020  8:26 PM Toño Spear Add [R13 10] Esophageal dysphagia     9/20/2020  8:26 PM Toño Spear Add [N17 9,  N18 4] Acute renal failure superimposed on stage 4 chronic kidney disease [facility])       ED Disposition     ED Disposition Condition Date/Time Comment    Admit Stable Sun Sep 20, 2020  8:07 PM Case was discussed with JUSTINE and the patient's admission status was agreed to be Admission Status: observation status to the service of Dr Dagoberto Mata             Follow-up Information    None         Current Discharge Medication List      CONTINUE these medications which have NOT CHANGED    Details   acetaminophen (TYLENOL) 325 mg tablet Take 650 mg by mouth every 6 (six) hours as needed       aspirin 81 MG tablet Take 81 mg by mouth daily      B Complex-C-Folic Acid (TRIPHROCAPS) 1 MG CAPS Take 1 capsule (1 mg total) by mouth daily with dinner  Qty: 30 each, Refills: 5    Associated Diagnoses: Acute kidney injury superimposed on chronic kidney disease (HCC)      BD INSULIN SYRINGE U/F 30G X 1/2" 0 3 ML MISC       cholecalciferol (VITAMIN D3) 1,000 units tablet Take 1,000 Units by mouth daily      cholestyramine sugar free (QUESTRAN LIGHT) 4 g packet Take 1 packet (4 g total) by mouth 2 (two) times a day  Qty: 60 packet, Refills: 0    Associated Diagnoses: Gastroesophageal reflux disease without esophagitis      colchicine (COLCRYS) 0 6 mg tablet Take 1 tablet (0 6 mg total) by mouth daily  Qty: 90 tablet, Refills: 3    Associated Diagnoses: Gout, unspecified cause, unspecified chronicity, unspecified site      famotidine (PEPCID) 10 mg tablet Take 1 tablet (10 mg total) by mouth daily at bedtime  Qty: 30 tablet, Refills: 0    Associated Diagnoses: Gastroesophageal reflux disease without esophagitis      furosemide (LASIX) 40 mg tablet Take 1 tablet (40 mg total) by mouth daily , do NOT take if you do not have swelling  Qty: 30 tablet, Refills: 5    Associated Diagnoses: Edema, unspecified type      insulin detemir (Levemir) 100 units/mL subcutaneous injection Inject 5 Units under the skin daily before breakfast  Refills: 0    Associated Diagnoses: Type 2 diabetes mellitus with hyperglycemia, with long-term current use of insulin ([facility])      insulin lispro (HumaLOG) 100 units/mL injection Inject 4 Units under the skin 3 (three) times a day before meals  Refills: 0    Associated Diagnoses: Type 2 diabetes mellitus with hypoglycemia without coma, with long-term current use of insulin ([facility])      Insulin Syringe-Needle U-100 30G X 1/2" 1 ML MISC by Does not apply route 3 (three) times a day  Qty: 100 each, Refills: 5    Associated Diagnoses: Type 2 diabetes mellitus with hyperglycemia, with long-term current use of insulin ([facility])      magnesium oxide (MAG-OX) 400 mg Take 1 tablet (400 mg total) by mouth 2 (two) times a day  Qty: 60 tablet, Refills: 5    Associated Diagnoses: Acute hypokalemia      nystatin (MYCOSTATIN) powder Apply topically 2 (two) times a day  Qty: 15 g, Refills: 0    Associated Diagnoses: Intertrigo      pantoprazole (PROTONIX) 40 mg tablet Take 1 tablet (40 mg total) by mouth 2 (two) times a day  Qty: 90 tablet, Refills: 0    Associated Diagnoses: Nausea and vomiting      sodium bicarbonate 650 mg tablet Take 650 mg by mouth 3 (three) times a day before meals      sucralfate (CARAFATE) 1 g/10 mL suspension Take 10 mL (1,000 mg total) by mouth every 6 (six) hours  Qty: 420 mL, Refills: 0    Associated Diagnoses: Gastroesophageal reflux disease without esophagitis      tamsulosin (FLOMAX) 0 4 mg Take 1 capsule (0 4 mg total) by mouth daily with dinner  Qty: 90 capsule, Refills: 3    Associated Diagnoses: Acute renal failure with other specified pathological kidney lesion superimposed on stage 3 chronic kidney disease (HCC)      NIFEdipine ER (ADALAT CC) 60 MG 24 hr tablet Take 1 tablet (60 mg total) by mouth daily  Qty: 30 tablet, Refills: 3    Associated Diagnoses: Essential hypertension           No discharge procedures on file      PDMP Review       Value Time User    PDMP Reviewed  Yes 3/7/2020 12:10 PM Dulce Keller [place]          ED Provider  Electronically Signed by           Ashley Montanez DO  09/21/20 0106

## 2020-09-21 NOTE — ASSESSMENT & PLAN NOTE
Follows with oncology in the outpatient basis  Receives chemo monthly  If discharged tomorrow, is advised to follow-up with oncology tomorrow

## 2020-09-21 NOTE — OCCUPATIONAL THERAPY NOTE
Occupational Therapy Evaluation Note        Patient Name: Kirstin Braun  AKPWF'H Date: 9/21/2020 09/21/20 0820   Note Type   Note type Eval only   Restrictions/Precautions   Weight Bearing Precautions Per Order No   Braces or Orthoses Sling  (LUE sling)   Other Precautions Contact/isolation; Chair Alarm; Bed Alarm;Multiple lines; Fall Risk   Pain Assessment   Pain Assessment Tool Pain Assessment not indicated - pt denies pain   Pain Score No Pain   Home Living   Type of [address] One level;Performs ADLs on one level; Other (Comment); Stairs to enter with rails; Ramped entrance  (4 [address])   Bathroom Shower/Tub Walk-in shower   Bathroom Toilet Standard   Bathroom Equipment Shower chair   [facility]; Other (Comment)  (rollator in community)   Prior Function   Level of [place] Needs assistance with IADLs; Needs assistance with ADLs and functional mobility   Lives With Significant other   Receives Help From Other (Comment)  (significant other)   ADL Assistance Needs assistance   IADLs Needs assistance   Falls in the last 6 months 1 to 4  (1 fall 3 weeks ago " bent over and lost balance")   Vocational Retired   Comments pt does not drive   Lifestyle   Autonomy Per pt report, he lives with his significant other in a one-story home with a ramped entrance  Patient reports that at baseline, he requires assistance with both ADLs and IADLs  Patient reports that he ambulates with the rollator in the community      Reciprocal Relationships Supportive significant other   Service to Others 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", collecting coins   ADL   Eating Assistance 5  Supervision/Setup   Grooming Assistance 4  Minimal [address] 3  Moderate [address] 2  Maximal [address] 3  Moderate [address] 2  Maximal [address] 3  Moderate Assistance   Functional Assistance 3  Moderate Assistance   Additional Comments ADL assist levels based on pt's functional performance during OT evaluation   Bed Mobility   Supine to Sit 3  Moderate assistance   Additional items Assist x 2;HOB elevated; Increased time required;Verbal cues   Additional Comments Pt received lying supine in bed upon OT arrival; at end of session: pt seated OOB to recliner chair w/ all needs within reach and chair alarm activated   Transfers   Sit to Stand 3  Moderate assistance   Additional items Assist x 2; Increased time required;Verbal cues   Stand to Sit 3  Moderate assistance   Additional items Assist x 2; Increased time required;Verbal cues   Additional Comments Performed w/ HHA, LUE kept NWB with sling donned and in place   Functional Mobility   Functional Mobility 3  Moderate assistance   Additional Comments x2   Additional items Hand hold assistance   Balance   Static Sitting Fair +   Dynamic Sitting Fair -   Static Standing Poor +   Dynamic Standing Poor   Ambulatory Poor   Activity Tolerance   Activity Tolerance Patient limited by fatigue   Medical Staff Made Aware  [address]   Nurse Made Aware KALYN Villagomez confirmed pt appropriate for therapy and made aware of session outcomes   RUE Assessment   RUE Assessment X  (Decreased AROM, ~90 degrees shoulder flexion)   RUE Overall AROM   R Mass Grasp Patient with difficulty opposing thumb to digit V   LUE Assessment   LUE Assessment X  (LUE sling donned during eval, LUE kept NWB )   LUE Overall AROM   L Mass Grasp Patient with difficulty opposing thumb to digit V   Hand Function   Gross Motor Coordination Impaired   Fine Motor Coordination Impaired   Sensation   Light Touch No apparent deficits   Vision-Basic Assessment   Current Vision Does not wear glasses   Cognition   Overall Cognitive Status Impaired   Arousal/Participation Alert; Responsive; Cooperative   Attention Within functional limits   Orientation Level Oriented X4 Memory Decreased recall of recent events;Decreased recall of precautions   Following Commands Follows multistep commands with increased time or repetition   Comments Pt agreeable to OT evaluation  Patient with impaired short term memory and delayed recall, patient with difficulty recalling previous fall  Assessment   Limitation Decreased ADL status; Decreased UE ROM; Decreased UE strength;Decreased Safe judgement during ADL;Decreased cognition;Decreased endurance;Decreased fine motor control;Decreased self-care trans;Decreased high-level ADLs   Prognosis Fair   Assessment Patient is a 79 y o  male seen for OT evaluation s/p admit to [address] on 9/20/2020 w/Esophageal dysphagia  Commorbidities affecting patient's functional performance at time of assessment include: esophageal dysphagia, acute renal failure superimposed on stage 4 CKD, gastroesophageal reflux disease without esophagitis, diastolic dysfunction, 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, essential HTN, and type 2 DM with hyperglycemia with long-term current use of insulin  Orders placed for OT evaluation and treatment  Performed at least two patient identifiers during session including name and wristband  Prior to admission, patient was living with his significant other in a one-story home with a ramped entrance  Patient reports that at baseline, he requires assistance with both ADLs and IADLs  Personal factors affecting patient at time of initial evaluation include: limited insight into deficits, decreased initiation and engagement, difficulty performing ADLs and difficulty performing IADLs  Upon evaluation, patient requires moderate assist for UB ADLs, maximal assist for LB ADLs, transfers and functional ambulation in room and bathroom with moderate assist x2, with HHA  Patient kept NWB for LUE and sling donned and in place throughout evaluation    Patient is alert and oriented x 4, presents with limited ability to recall information after a brief period of time (short term memory) / working memory   Occupational performance is affected by the following deficits: decreased functional use of BUEs, limited active ROM, decreased muscle strength, impaired gross motor coordination, impaired fine motor coordination, dynamic sit/ stand balance deficit with poor standing tolerance time for self care and functional mobility, decreased activity tolerance, impaired memory, decreased safety awareness, decreased coping skills and postural control and postural alignment deficit, requiring external assistance to complete transitional movements  Therapist completed  extensive additional review of medical records and additional review of physical, cognitive or psychosocial history, clinical examination identifying 5 or more performance deficits, clinical decision making of a high complexity , consistent with a high complexity level evaluation  Patient to benefit from continued Occupational Therapy treatment while in the hospital to address deficits as defined above and maximize level of functional independence with ADLs and functional mobility  Occupational Performance areas to address include: grooming , bathing/ shower, dressing, toilet hygiene, transfer to all surfaces, functional mobility, community mobility, health maintenance, IADLs: safety procedures, Leisure Participation and Social participation  From OT standpoint, recommendation at time of d/c would be post-acute rehabilitation services  Goals   Patient Goals to return home   Plan   Treatment Interventions ADL retraining;Functional transfer training;UE strengthening/ROM; Endurance training;Cognitive reorientation;Patient/family training;Equipment evaluation/education; Fine motor coordination activities; Compensatory technique education; Activityengagement; Energy conservation;Continued evaluation   Goal Expiration Date 10/05/20   OT Treatment Day 0   OT Frequency 3-5x/wk   Recommendation OT Discharge Recommendation Post-Acute Rehabilitation Services   OT - OK to Discharge Yes  (once medically cleared to rehab)   Barthel Index   Feeding 5   Bathing 0   Grooming Score 0   Dressing Score 5   Bladder Score 0   Bowels Score 10   Toilet Use Score 5   Transfers (Bed/Chair) Score 5   Mobility (Level Surface) Score 0   Stairs Score 0   Barthel Index Score 30   Modified Alexander Scale   Modified Alexander Scale 4     Occupational Therapy Goals to be completed in 7-14 Days:    1- Patient will increase OOB/ sitting tolerance to 2-4 hours per day for increased participation in self care and leisure tasks with no s/s of exertion  2- Patient will increase standing tolerance time to 3 minutes with unilateral UE support to complete sink level ADLs@ min assist level  3- Patient will follow 100% simple two to three step directives without verbal cues  4- Patient will increase sitting tolerance at edge of bed to 20 minutes to complete UB ADLs @ min assist level  5- Patient will transfer bed to Chair / toilet with min assist with AD as indicated  6- Patient will complete UB ADLs with set up assist     7- Patient will complete LB ADLs with min assist with the use of adaptive equipment  8- Patient will complete toileting hygiene with mod assist assist/ supervision for thoroughness  9- Patient/ Family will demonstrate competency with UE Home Exercise Program      10- Patient will engage in depression screen/ leisure interest check list to identify s/s of depression and facilitate patients involvement in play/ leisure tasks  11- Patient will complete Interest checklist to explore participation in play/ leisure tasks for increased satisfaction and quality of life  12- Pt will attend to continued cognitive assessment 100% of the time in order to provide most appropriate recommendations for d/c plans      Ju Jean OTR/L

## 2020-09-21 NOTE — ASSESSMENT & PLAN NOTE
Yamilet Kiser is a 79 y o  male with 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, on chemotherapy, HTN, DM type 2, GERD who presents to [facility] ED on 09/20/2020 with dizziness, nausea, and vomiting  Given truncal weakness on exam and reported dysphagia, will obtain MRI brain to rule out acute posterior infarct  If MRI brain is unremarkable for infarct, etiology likely peripheral vertigo  If MRI is unremarkable, would recommend meclizine trial if symptoms persist as well as outpatient vestibular therapy      Plan:  -MRI brain pending  -Pending: hemoglobin A1C, urine culture  -Stated on ASA 81 mg daily, discussed with primary team  -If MRI brain is unremarkable for acute infarct and symptomology persists, would recommend meclizine trial and follow up with outpatient vestibular therapy  -Medical management per primary team  -Continue supportive care per primary team, notify with changes    Imaging/Labs:  -CT head 09/20/2020:  · No acute intracranial abnormalities    -Leukocytosis on presentation with an elevated WBC of 10 32  -Anemic on presentation:  RBC low 3 71, Hgb low 11 2, Hct low 34 3  · Most recent CBC:  RBC low 2 82, Hgb low 8 4, Hct low 26 2  -Creatinine elevated on presentation 4 72  -BUN elevated on presentation 46  -UA revealed moderate bacteria 20-30 wbc's  -Labs WNL:  Troponin, COVID-19

## 2020-09-21 NOTE — H&P
H&P- Johan Pages 1940, 79 y o  male MRN: [id number]    Unit/Bed#: FT 01 Encounter: [id number]    Primary Care Provider: Waynard Leventhal, CRNP   Date and time admitted to hospital: 9/20/2020  4:08 PM        * Esophageal dysphagia  Assessment & Plan  Presented with difficulty swallowing and foreign body sensation in throat  With associated nausea and vomiting  Currently tolerating on secretions are no emergent need to do EGD  Will consult GI for possible EGD tomorrow  Will keep NPO  Zofran for nausea    Acute renal failure superimposed on stage 4 chronic kidney disease ([facility] [address] )  Assessment & Plan  Pre renal in etiology likely secondary to volume depletion from nausea and vomiting and poor oral intake  Will provide gentle hydration  Nephrology consult   BMP in a m  Gastroesophageal reflux disease without esophagitis  Assessment & Plan  Continue PPI    Diastolic dysfunction  Assessment & Plan  Currently euvolemic  Hold diuretics in the setting of SILVERIO    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 [facility])  Assessment & Plan  Follows with oncology in the outpatient basis  Receives chemo monthly    Essential hypertension  Assessment & Plan  Continue nifedipine and metoprolol    Type 2 diabetes mellitus with hyperglycemia, with long-term current use of insulin [facility])  Assessment & Plan  Lab Results   Component Value Date    HGBA1C 7 5 (A) 06/18/2020       Recent Labs     09/20/20  1631   POCGLU 192*       Blood Sugar Average: Last 72 hrs:  (P) 192   Continue home Levemir 5 units q a m  Sliding scale insulin        VTE Prophylaxis: Heparin  / sequential compression device   Code Status: full code  POLST: There is no POLST form on file for this patient (pre-hospital)  Discussion with family: pt    Anticipated Length of Stay:  Patient will be admitted on an Observation basis with an anticipated length of stay of  < 2 midnights     Justification for Hospital Stay:  Dysphagia    Total Time for Visit, including Counseling / Coordination of Care: 1 hour  Greater than 50% of this total time spent on direct patient counseling and coordination of care  Chief Complaint:   Nausea and vomiting    History of Present Illness:    Janes Jose is a 79 y o  male with past medical history significant of neuroendocrine carcinoma with Mets to the liver, hypertension, type 2 diabetes on insulin who initially presented with nausea and vomiting  Patient states since early this morning he began having dizziness with associated nausea and vomiting  Reports nonbilious nonbloody vomiting  He reports he feels like something is stuck in his throat  He is able to tolerate his own secretions but having difficulty with swallowing  And reports he can swallow water still  Denies abdominal pain, fevers, chills, dysuria, numbness, weakness or any other symptoms at this time    Review of Systems:    Review of Systems   Constitutional: Negative for appetite change, chills, diaphoresis, fatigue, fever and unexpected weight change  HENT: Positive for trouble swallowing  Negative for congestion, rhinorrhea and sore throat  Eyes: Negative for photophobia and visual disturbance  Respiratory: Negative for cough, shortness of breath and wheezing  Cardiovascular: Negative for chest pain, palpitations and leg swelling  Gastrointestinal: Positive for nausea and vomiting  Negative for abdominal pain, anal bleeding, blood in stool, constipation and diarrhea  Genitourinary: Negative for decreased urine volume, difficulty urinating, dysuria, flank pain, frequency, hematuria and urgency  Musculoskeletal: Negative for arthralgias, back pain, joint swelling and myalgias  Skin: Negative for color change and rash  Neurological: Positive for dizziness  Negative for seizures, facial asymmetry, speech difficulty, numbness and headaches  Psychiatric/Behavioral: Negative for agitation, confusion and decreased concentration   The patient is not nervous/anxious  Past Medical and Surgical History:     Past Medical History:   Diagnosis Date    Acute pancreatitis without infection or necrosis 9/26/2019    Anemia of chronic renal failure     BPH (benign prostatic hyperplasia)     Chronic kidney disease (CKD), stage IV (severe)     Coronary artery disease     DJD (degenerative joint disease)     Essential hypertension     Gait disturbance     Generalized weakness     GERD (gastroesophageal reflux disease)     Gout     History of transfusion     Hydronephrosis     Hyperlipidemia     Hypertension     Liver cancer     Pressure injury of skin     Proteinuria     Renal disorder     Retention of urine     Thrombophlebitis migrans     Type 2 diabetes mellitus        Past Surgical History:   Procedure Laterality Date    CHOLECYSTECTOMY      CHOLECYSTECTOMY LAPAROSCOPIC N/A 2/7/2020    Procedure: CHOLECYSTECTOMY LAPAROSCOPIC;  Surgeon: Kip Ac MD;  Location: [address] [place] [address] MAIN OR;  Service: General    CORONARY ANGIOPLASTY WITH STENT PLACEMENT      FL RETROGRADE PYELOGRAM  5/21/2020    IR BIOPSY LIVER MASS  1/24/2019    IR CHOLECYSTOSTOMY TUBE PLACEMENT  9/6/2019    MA CYSTO/URETERO W/LITHOTRIPSY &INDWELL STENT INSRT Left 5/21/2020    Procedure: CYSTOSCOPY URETEROSCOPY WITH LITHOTRIPSY HOLMIUM LASER, RETROGRADE PYELOGRAM AND INSERTION STENT URETERAL;  Surgeon: Madai Kirk MD;  Location: [facility] MAIN OR;  Service: Urology    MA CYSTOURETHROSCOPY,URETER CATHETER Left 4/21/2020    Procedure: CYSTOSCOPY WITH INSERTION STENT URETERAL;  Surgeon: Tobias Mcclendon MD;  Location: [facility] Main [facility];  Service: Urology       Meds/Allergies:    Prior to Admission medications    Medication Sig Start Date End Date Taking?  Authorizing Provider   acetaminophen (TYLENOL) 325 mg tablet Take 650 mg by mouth every 6 (six) hours as needed    Yes Historical Provider, MD   aspirin 81 MG tablet Take 81 mg by mouth daily   Yes Historical ProviderMD YODER Complex-C-Folic Acid (TRIPHROCAPS) 1 MG CAPS Take 1 capsule (1 mg total) by mouth daily with dinner 7/22/20  Yes Kianna Prince DO   BD INSULIN SYRINGE U/F 30G X 1/2" 0 3 ML MISC  3/2/20  Yes Historical Provider, MD   cholecalciferol (VITAMIN D3) 1,000 units tablet Take 1,000 Units by mouth daily   Yes Historical Provider, MD   cholestyramine sugar free (QUESTRAN LIGHT) 4 g packet Take 1 packet (4 g total) by mouth 2 (two) times a day 8/11/20  Yes RIKA Bravo   colchicine (COLCRYS) 0 6 mg tablet Take 1 tablet (0 6 mg total) by mouth daily 8/26/20 11/24/20 Yes Kianna Prince DO   famotidine (PEPCID) 10 mg tablet Take 1 tablet (10 mg total) by mouth daily at bedtime 8/11/20  Yes RIKA Bravo   furosemide (LASIX) 40 mg tablet Take 1 tablet (40 mg total) by mouth daily , do NOT take if you do not have swelling 7/13/20  Yes Kianna Prince DO   insulin detemir (Levemir) 100 units/mL subcutaneous injection Inject 5 Units under the skin daily before breakfast 8/11/20  Yes RIKA Sanchez   insulin lispro (HumaLOG) 100 units/mL injection Inject 4 Units under the skin 3 (three) times a day before meals 6/26/20  Yes Lluvia Cha MD   Insulin Syringe-Needle U-100 30G X 1/2" 1 ML MISC by Does not apply route 3 (three) times a day 12/26/19  Yes Vee Flores MD   magnesium oxide (MAG-OX) 400 mg Take 1 tablet (400 mg total) by mouth 2 (two) times a day 4/16/20  Yes Kianna Prince DO   nystatin (MYCOSTATIN) powder Apply topically 2 (two) times a day 5/7/20  Yes Vee Flores MD   pantoprazole (PROTONIX) 40 mg tablet Take 1 tablet (40 mg total) by mouth 2 (two) times a day 8/11/20  Yes RIKA Sanchez   sodium bicarbonate 650 mg tablet Take 650 mg by mouth 3 (three) times a day before meals   Yes Historical Provider, MD   sucralfate (CARAFATE) 1 g/10 mL suspension Take 10 mL (1,000 mg total) by mouth every 6 (six) hours 8/11/20  Yes RIKA Bravo tamsulosin (FLOMAX) 0 4 mg Take 1 capsule (0 4 mg total) by mouth daily with dinner 4/9/20  Yes [facility],    NIFEdipine ER (ADALAT CC) 60 MG 24 hr tablet Take 1 tablet (60 mg total) by mouth daily 7/31/20   [facility],      I have reviewed home medications with patient personally  Allergies: No Known Allergies    Social History:     Marital Status:      Patient Pre-hospital Living Situation: home  Patient Pre-hospital Level of Mobility: independent  Patient Pre-hospital Diet Restrictions: none  Substance Use History:   Social History     Substance and Sexual Activity   Alcohol Use Not Currently    Alcohol/week: 0 0 standard drinks    Frequency: Never     Social History     Tobacco Use   Smoking Status Never Smoker   Smokeless Tobacco Never Used     Social History     Substance and Sexual Activity   Drug Use Never       Family History:    Family History   Problem Relation Age of Onset    Cancer Mother     Hypertension Father     Cancer Brother     Cancer Maternal Grandmother     Cancer Paternal Aunt        Physical Exam:     Vitals:   Blood Pressure: 149/74 (09/20/20 1930)  Pulse: 82 (09/20/20 1930)  Temperature: 97 8 °F (36 6 °C) (09/20/20 1745)  Temp Source: Oral (09/20/20 1745)  Respirations: 12 (09/20/20 1930)  Height: 5' 8" (172 7 cm) (09/20/20 1620)  Weight - Scale: 82 kg (180 lb 12 4 oz) (09/20/20 1620)  SpO2: 98 % (09/20/20 1930)    Physical Exam  Constitutional:       General: He is not in acute distress  Appearance: He is well-developed  He is not diaphoretic  HENT:      Head: Normocephalic and atraumatic  Nose: Nose normal       Mouth/Throat:      Pharynx: No oropharyngeal exudate  Eyes:      General: No scleral icterus  Conjunctiva/sclera: Conjunctivae normal    Neck:      Musculoskeletal: Normal range of motion and neck supple  Cardiovascular:      Rate and Rhythm: Normal rate and regular rhythm  Heart sounds: Normal heart sounds   No murmur  No friction rub  No gallop  Pulmonary:      Effort: Pulmonary effort is normal  No respiratory distress  Breath sounds: Normal breath sounds  No wheezing or rales  Chest:      Chest wall: No tenderness  Abdominal:      General: Bowel sounds are normal  There is no distension  Palpations: Abdomen is soft  Tenderness: There is no abdominal tenderness  There is no guarding  Musculoskeletal: Normal range of motion  General: No tenderness or deformity  Skin:     General: Skin is warm and dry  Findings: No erythema  Neurological:      Mental Status: He is alert and oriented to person, place, and time  Additional Data:     Lab Results: I have personally reviewed pertinent reports  Results from last 7 days   Lab Units 09/20/20  1630 09/14/20  0822   WBC Thousand/uL 10 32* 8 04   HEMOGLOBIN g/dL 11 2* 10 7*   HEMATOCRIT % 34 3* 32 7*   PLATELETS Thousands/uL 179 170   NEUTROS PCT %  --  61   LYMPHS PCT %  --  25   MONOS PCT %  --  10   EOS PCT %  --  3     Results from last 7 days   Lab Units 09/20/20  1630   SODIUM mmol/L 139   POTASSIUM mmol/L 3 9   CHLORIDE mmol/L 101   CO2 mmol/L 24   BUN mg/dL 46*   CREATININE mg/dL 4 72*   ANION GAP mmol/L 14*   CALCIUM mg/dL 9 0   ALBUMIN g/dL 3 7   TOTAL BILIRUBIN mg/dL 0 50   ALK PHOS U/L 156*   ALT U/L 26   AST U/L 16   GLUCOSE RANDOM mg/dL 210*     Results from last 7 days   Lab Units 09/20/20  1630   INR  0 98     Results from last 7 days   Lab Units 09/20/20  1631   POC GLUCOSE mg/dl 192*               Imaging: I have personally reviewed pertinent reports  CT soft tissue neck wo contrast   Final Result by Addi Campos MD (09/20 1938)      No evidence of mass, lymphadenopathy or inflammation in the neck  Workstation performed: [id number]         CT head without contrast   Final Result by Addi Campos MD (09/20 1941)      No acute intracranial abnormality                    Workstation performed: [id number]         XR chest portable   Final Result by Adi Gill MD (09/20 1648)      No acute cardiopulmonary disease  Workstation performed: [id number]             EKG, Pathology, and Other Studies Reviewed on Admission:   · EKG: reviewed    Allscripts / Epic Records Reviewed: Yes     ** Please Note: This note has been constructed using a voice recognition system   **

## 2020-09-21 NOTE — CONSULTS
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, on chemotherapy, HTN, DM type 2, GERD who presents to [facility] ED on 09/20/2020 with dizziness, nausea, and vomiting  Given truncal weakness on exam and reported dysphagia, will obtain MRI brain to rule out acute posterior infarct  If MRI brain is unremarkable for infarct, etiology likely peripheral vertigo  If MRI is unremarkable, would recommend meclizine trial if symptoms persist as well as outpatient vestibular therapy      Plan:  -MRI brain pending  -Pending: hemoglobin A1C, urine culture  -Stated on ASA 81 mg daily, discussed with primary team  -If MRI brain is unremarkable for acute infarct and symptomology persists, would recommend meclizine trial and follow up with outpatient vestibular therapy  -Medical management per primary team  -Continue supportive care per primary team, notify with changes    Imaging/Labs:  -CT head 09/20/2020:  · No acute intracranial abnormalities    -Leukocytosis on presentation with an elevated WBC of 10 32  -Anemic on presentation:  RBC low 3 71, Hgb low 11 2, Hct low 34 3  · Most recent CBC:  RBC low 2 82, Hgb low 8 4, Hct low 26 2  -Creatinine elevated on presentation 4 72  -BUN elevated on presentation 46  -UA revealed moderate bacteria 20-30 wbc's  -Labs WNL:  Troponin, COVID-19    Acute renal failure superimposed on stage 4 chronic kidney disease ([facility] [address] )  Assessment & Plan  CKD stage 5 with a baseline creatinine of 3 5-4 3  Nephrology following    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 [facility])  Assessment & Plan  On chemotherapy    Essential hypertension  Assessment & Plan  Goal normotension    Type 2 diabetes mellitus with hyperglycemia, with long-term current use of insulin [facility])  Assessment & Plan  Lab Results Component Value Date    HGBA1C 7 5 (A) 06/18/2020       Recent Labs     09/20/20  1631 09/21/20  0038 09/21/20  0736 09/21/20  1054   POCGLU 192* 182* 136 120       Blood Sugar Average: Last 72 hrs:  (P) 157 5    * Esophageal dysphagia  Assessment & Plan  Per discussion with GI, strong suspicion for acute esophagitis in the setting of multiple episodes of emesis  GI following    Recommendations for outpatient neurological follow up have yet to be determined  History of Present Illness     Reason for Consult / Principal Problem:  Dizziness    HPI: Perri Miguel is a 79 y o   male with 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, on chemotherapy, HTN, DM type 2, GERD who presents to [facility] ED on 09/20/2020 with dizziness, nausea, and vomiting  History provided by both patient and spouse  Patient was sitting on the toilet when he acutely became dizzy  Patient states he was not straining and does not have any problems with his BMs  Patient reports he was feeling lightheaded as if he was going to pass out, and also admits to room spinning sensation  Patient states that lying flat made the dizziness worse  Patient is unable to recall if he had any visual dysfunction, specifically double vision  At baseline patient has cataracts bilaterally, limiting his vision  He admits to nausea and vomiting associated with the dizziness  Patient reports after multiple episodes of emesis, he had difficulty with swallowing  Patient denies any speech disturbances, facial weakness  At baseline patient ambulates with a walker at home  Patient was diagnosed with cancer on 01/23/2019, and is 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therapy  Denies history of stroke or DVT  Denies recent HA or confusion  Denies recent head injuries or spine issues/stenosis  Patient presented to [facility] ED on 09/20/2020  Slightly tachycardic on presentation with an HR of 122  /71    CT head obtained an unremarkable for acute intracranial abnormalities  Patient reports he continued to have the dizziness overnight  Unable to report if any medications given in the ED or IV fluids improved the dizziness  Patient states that he is now back to baseline  He admits to numbness and tingling in bilateral feet for >1 year  Patient reports recently his numbness/tingling moved from his feet to his hands bilaterally  Inpatient consult to Neurology  Consult performed by: Madhuri Hyde PA-C  Consult ordered by: Yesica Goodwin MD        Review of Systems  12 point ROS performed, as stated above, all others negative  Historical Information   Past Medical History:   Diagnosis Date    Acute pancreatitis without infection or necrosis 9/26/2019    Anemia of chronic renal failure     BPH (benign prostatic hyperplasia)     Chronic kidney disease (CKD), stage IV (severe)     Coronary artery disease     DJD (degenerative joint disease)     Essential hypertension     Gait disturbance     Generalized weakness     GERD (gastroesophageal reflux disease)     Gout     History of transfusion     Hydronephrosis     Hyperlipidemia     Hypertension     Liver cancer     Pressure injury of skin     Proteinuria     Renal disorder     Retention of urine     Thrombophlebitis migrans     Type 2 diabetes mellitus      Past Surgical History:   Procedure Laterality Date    CHOLECYSTECTOMY      CHOLECYSTECTOMY LAPAROSCOPIC N/A 2/7/2020    Procedure: CHOLECYSTECTOMY LAPAROSCOPIC;  Surgeon:  Sadie Alvarado MD;  Location: [address] [place] [address];  Service: General    CORONARY ANGIOPLASTY WITH STENT PLACEMENT      FL RETROGRADE PYELOGRAM  5/21/2020    IR BIOPSY LIVER MASS  1/24/2019    IR CHOLECYSTOSTOMY TUBE PLACEMENT  9/6/2019    DC CYSTO/URETERO W/LITHOTRIPSY &INDWELL STENT INSRT Left 5/21/2020    Procedure: CYSTOSCOPY URETEROSCOPY WITH LITHOTRIPSY HOLMIUM LASER, RETROGRADE PYELOGRAM AND INSERTION STENT URETERAL;  Surgeon: Finn Klein MD;  Location: [facility] OR;  Service: Urology    CO CYSTOURETHROSCOPY,URETER CATHETER Left 4/21/2020    Procedure: CYSTOSCOPY WITH INSERTION STENT URETERAL;  Surgeon: Izabella Hussein MD;  [facility];  Service: Urology     Social History   Social History     Substance and Sexual Activity   Alcohol Use Not Currently    Alcohol/week: 0 0 standard drinks    Frequency: Never     Social History     Substance and Sexual Activity   Drug Use Never     E-Cigarette/Vaping    E-Cigarette Use Never User      E-Cigarette/Vaping Substances     Social History     Tobacco Use   Smoking Status Never Smoker   Smokeless Tobacco Never Used     Family History:   Family History   Problem Relation Age of Onset    Cancer Mother     Hypertension Father     Cancer Brother     Cancer Maternal Grandmother     Cancer Paternal Aunt        Review of previous medical records was completed  Meds/Allergies   all current active meds have been reviewed, current meds:   Current Facility-Administered Medications   Medication Dose Route Frequency    acetaminophen (TYLENOL) tablet 650 mg  650 mg Oral Q6H PRN    aspirin (ECOTRIN LOW STRENGTH) EC tablet 81 mg  81 mg Oral Daily    heparin (porcine) subcutaneous injection 5,000 Units  5,000 Units Subcutaneous Q8H [facility]    insulin detemir (LEVEMIR) subcutaneous injection 5 Units  5 Units Subcutaneous Daily Before Breakfast    insulin lispro (HumaLOG) 100 units/mL subcutaneous injection 1-5 Units  1-5 Units Subcutaneous Q6H [facility]    insulin lispro (HumaLOG) 100 units/mL subcutaneous injection 4 Units  4 Units Subcutaneous TID AC    Labetalol HCl (NORMODYNE) injection 10 mg  10 mg Intravenous Q4H PRN    NIFEdipine (PROCARDIA XL) 24 hr tablet 60 mg  60 mg Oral Daily    ondansetron (ZOFRAN) injection 4 mg  4 mg Intravenous Q6H PRN    pantoprazole (PROTONIX) injection 40 mg  40 mg Intravenous Q12H [facility]   , PTA meds:   Prior to Admission Medications   Prescriptions Last Dose Informant Patient Reported? Taking?    B Complex-C-Folic Acid (TRIPHROCAPS) 1 MG CAPS  Spouse/Significant Other No Yes   Sig: Take 1 capsule (1 mg total) by mouth daily with dinner   BD INSULIN SYRINGE U/F 30G X 1/2" 0 3 ML MISC  Spouse/Significant Other Yes Yes   Insulin Syringe-Needle U-100 30G X 1/2" 1 ML MISC  Spouse/Significant Other No Yes   Sig: by Does not apply route 3 (three) times a day   NIFEdipine ER (ADALAT CC) 60 MG 24 hr tablet  Spouse/Significant Other No No   Sig: Take 1 tablet (60 mg total) by mouth daily   acetaminophen (TYLENOL) 325 mg tablet  Spouse/Significant Other Yes Yes   Sig: Take 650 mg by mouth every 6 (six) hours as needed    aspirin 81 MG tablet  Spouse/Significant Other Yes Yes   Sig: Take 81 mg by mouth daily   cholecalciferol (VITAMIN D3) 1,000 units tablet  Spouse/Significant Other Yes Yes   Sig: Take 1,000 Units by mouth daily   cholestyramine sugar free (QUESTRAN LIGHT) 4 g packet  Spouse/Significant Other No Yes   Sig: Take 1 packet (4 g total) by mouth 2 (two) times a day   colchicine (COLCRYS) 0 6 mg tablet  Spouse/Significant Other No Yes   Sig: Take 1 tablet (0 6 mg total) by mouth daily   famotidine (PEPCID) 10 mg tablet  Spouse/Significant Other No Yes   Sig: Take 1 tablet (10 mg total) by mouth daily at bedtime   furosemide (LASIX) 40 mg tablet  Spouse/Significant Other No Yes   Sig: Take 1 tablet (40 mg total) by mouth daily , do NOT take if you do not have swelling   insulin detemir (Levemir) 100 units/mL subcutaneous injection  Spouse/Significant Other No Yes   Sig: Inject 5 Units under the skin daily before breakfast   insulin lispro (HumaLOG) 100 units/mL injection  Spouse/Significant Other No Yes   Sig: Inject 4 Units under the skin 3 (three) times a day before meals   magnesium oxide (MAG-OX) 400 mg  Spouse/Significant Other No Yes   Sig: Take 1 tablet (400 mg total) by mouth 2 (two) times a day   nystatin (MYCOSTATIN) powder  Spouse/Significant Other No Yes   Sig: Apply topically 2 (two) times a day pantoprazole (PROTONIX) 40 mg tablet  Spouse/Significant Other No Yes   Sig: Take 1 tablet (40 mg total) by mouth 2 (two) times a day   sodium bicarbonate 650 mg tablet  Spouse/Significant Other Yes Yes   Sig: Take 650 mg by mouth 3 (three) times a day before meals   sucralfate (CARAFATE) 1 g/10 mL suspension  Spouse/Significant Other No Yes   Sig: Take 10 mL (1,000 mg total) by mouth every 6 (six) hours   tamsulosin (FLOMAX) 0 4 mg  Spouse/Significant Other No Yes   Sig: Take 1 capsule (0 4 mg total) by mouth daily with dinner      Facility-Administered Medications: None    and     No Known Allergies    Objective   Vitals:Blood pressure 130/79, pulse 74, temperature (!) 97 4 °F (36 3 °C), resp  rate 18, height 5' 8" (1 727 m), weight 82 kg (180 lb 12 4 oz), SpO2 94 %  ,Body mass index is 27 49 kg/m²  Intake/Output Summary (Last 24 hours) at 9/21/2020 1612  Last data filed at 9/21/2020 1300  Gross per 24 hour   Intake 360 ml   Output 1600 ml   Net -1240 ml       Invasive Devices: Invasive Devices     Peripheral Intravenous Line            Peripheral IV 09/20/20 Left Antecubital less than 1 day    Peripheral IV 09/20/20 Right Hand less than 1 day          Drain            Ureteral Drain/Stent Left ureter 6 Fr  123 days    Urethral Catheter Double-lumen 16 Fr  89 days                Physical Exam  Vitals signs and nursing note reviewed  Constitutional:       General: He is not in acute distress  Appearance: Normal appearance  He is normal weight  He is not ill-appearing, toxic-appearing or diaphoretic  HENT:      Head: Normocephalic and atraumatic  Eyes:      General: No scleral icterus  Right eye: No discharge  Left eye: No discharge  Extraocular Movements: Extraocular movements intact and EOM normal       Conjunctiva/sclera: Conjunctivae normal    Neck:      Musculoskeletal: Normal range of motion and neck supple     Pulmonary:      Effort: Pulmonary effort is normal  No respiratory distress  Skin:     General: Skin is warm and dry  Coloration: Skin is not jaundiced or pale  Findings: No bruising, erythema, lesion or rash  Neurological:      Mental Status: He is alert  Coordination: Finger-Nose-Finger Test normal    Psychiatric:         Mood and Affect: Mood normal          Behavior: Behavior normal          Thought Content: Thought content normal          Judgment: Judgment normal        Neurologic Exam     Mental Status   Patient is alert, lying in bed, accompanied by spouse  Oriented x 3  No dysarthria or aphasia noted  Able to follow multistep commands and answers all questions appropriately  Cranial Nerves     CN II   Visual fields full to confrontation  CN III, IV, VI   Extraocular motions are normal    Nystagmus: none   Upgaze: normal  Downgaze: normal  Conjugate gaze: present    CN V   Facial sensation intact  CN VII   Facial expression full, symmetric  CN VIII   Hearing: intact    CN XI   CN XI normal      CN XII   Tongue deviation: none    Motor Exam   Muscle bulk: normal  Bilateral upper lower extremity strength 5/5 throughout except as noted:  Truncal weakness noted, limiting patient's ability sit up on bed on his own  Left lower extremity strength 4/5     Sensory Exam   Light touch normal    No evidence of extinction on exam     Gait, Coordination, and Reflexes     Coordination   Finger to nose coordination: normal    Tremor   Resting tremor: absent    Reflexes   Right ankle clonus: absent  Left ankle clonus: absent  Negative Babinski bilaterally  Gait testing deferred due to truncal weakness and fall risk     Lab Results: I have personally reviewed pertinent reports    Recent Results (from the past 24 hour(s))   CBC and Platelet    Collection Time: 09/20/20  4:30 PM   Result Value Ref Range    WBC 10 32 (H) 4 31 - 10 16 Thousand/uL    RBC 3 71 (L) 3 88 - 5 62 Million/uL    Hemoglobin 11 2 (L) 12 0 - 17 0 g/dL    Hematocrit 34 3 (L) 36 5 - 49 3 %    MCV 93 82 - 98 fL    MCH 30 2 26 8 - 34 3 pg    MCHC 32 7 31 4 - 37 4 g/dL    RDW 13 6 11 6 - 15 1 %    Platelets 251 094 - 610 Thousands/uL    MPV 10 6 8 9 - 12 7 fL   Protime-INR    Collection Time: 09/20/20  4:30 PM   Result Value Ref Range    Protime 13 2 11 6 - 14 5 seconds    INR 0 98 0 84 - 1 19   APTT    Collection Time: 09/20/20  4:30 PM   Result Value Ref Range    PTT 29 23 - 37 seconds   Troponin I    Collection Time: 09/20/20  4:30 PM   Result Value Ref Range    Troponin I <0 02 <=0 04 ng/mL   Novel Coronavirus (Covid-19),PCR [facility]    Collection Time: 09/20/20  4:30 PM    Specimen: Nose; Nares   Result Value Ref Range    SARS-CoV-2 Negative Negative   Comprehensive metabolic panel    Collection Time: 09/20/20  4:30 PM   Result Value Ref Range    Sodium 139 136 - 145 mmol/L    Potassium 3 9 3 5 - 5 3 mmol/L    Chloride 101 100 - 108 mmol/L    CO2 24 21 - 32 mmol/L    ANION GAP 14 (H) 4 - 13 mmol/L    BUN 46 (H) 5 - 25 mg/dL    Creatinine 4 72 (H) 0 60 - 1 30 mg/dL    Glucose 210 (H) 65 - 140 mg/dL    Calcium 9 0 8 3 - 10 1 mg/dL    AST 16 5 - 45 U/L    ALT 26 12 - 78 U/L    Alkaline Phosphatase 156 (H) 46 - 116 U/L    Total Protein 7 8 6 4 - 8 2 g/dL    Albumin 3 7 3 5 - 5 0 g/dL    Total Bilirubin 0 50 0 20 - 1 00 mg/dL    eGFR 11 ml/min/1 73sq m   Fingerstick Glucose (POCT)    Collection Time: 09/20/20  4:31 PM   Result Value Ref Range    POC Glucose 192 (H) 65 - 140 mg/dl   UA w Reflex to Microscopic w Reflex to Culture    Collection Time: 09/20/20  6:44 PM    Specimen: Urine, Indwelling Haile Catheter   Result Value Ref Range    Color, UA Yellow     Clarity, UA Cloudy     Specific [place], UA 1 025 1 003 - 1 030    pH, UA 5 5 4 5, 5 0, 5 5, 6 0, 6 5, 7 0, 7 5, 8 0    Leukocytes, UA Moderate (A) Negative    Nitrite, UA Negative Negative    Protein,  (2+) (A) Negative mg/dl    Glucose,  (1/4%) (A) Negative mg/dl    Ketones, UA Trace (A) Negative mg/dl    Urobilinogen, UA 0 2 0  2, 1 0 E U /dl E U /dl    Bilirubin, UA Negative Negative    Blood, UA Small (A) Negative   Urine Microscopic    Collection Time: 09/20/20  6:44 PM   Result Value Ref Range    RBC, UA 0-1 (A) None Seen, 0-5 /hpf    WBC, UA 20-30 (A) None Seen, 0-5, 5-55, 5-65 /hpf    Epithelial Cells Occasional None Seen, Occasional /hpf    Bacteria, UA Moderate (A) None Seen, Occasional /hpf    Fine granular casts 0-1 /lpf    AMORPH URATES Moderate /hpf   Fingerstick Glucose (POCT)    Collection Time: 09/21/20 12:38 AM   Result Value Ref Range    POC Glucose 182 (H) 65 - 140 mg/dl   Basic metabolic panel    Collection Time: 09/21/20  5:45 AM   Result Value Ref Range    Sodium 142 136 - 145 mmol/L    Potassium 4 0 3 5 - 5 3 mmol/L    Chloride 106 100 - 108 mmol/L    CO2 25 21 - 32 mmol/L    ANION GAP 11 4 - 13 mmol/L    BUN 45 (H) 5 - 25 mg/dL    Creatinine 4 40 (H) 0 60 - 1 30 mg/dL    Glucose 146 (H) 65 - 140 mg/dL    Glucose, Fasting 146 (H) 65 - 99 mg/dL    Calcium 8 3 8 3 - 10 1 mg/dL    eGFR 12 ml/min/1 73sq m   CBC and differential    Collection Time: 09/21/20  5:45 AM   Result Value Ref Range    WBC 7 34 4 31 - 10 16 Thousand/uL    RBC 2 82 (L) 3 88 - 5 62 Million/uL    Hemoglobin 8 4 (L) 12 0 - 17 0 g/dL    Hematocrit 26 2 (L) 36 5 - 49 3 %    MCV 93 82 - 98 fL    MCH 29 8 26 8 - 34 3 pg    MCHC 32 1 31 4 - 37 4 g/dL    RDW 13 5 11 6 - 15 1 %    MPV 10 9 8 9 - 12 7 fL    Platelets 156 (L) 662 - 390 Thousands/uL    nRBC 0 /100 WBCs    Neutrophils Relative 74 43 - 75 %    Immat GRANS % 1 0 - 2 %    Lymphocytes Relative 14 14 - 44 %    Monocytes Relative 10 4 - 12 %    Eosinophils Relative 0 0 - 6 %    Basophils Relative 1 0 - 1 %    Neutrophils Absolute 5 46 1 85 - 7 62 Thousands/µL    Immature Grans Absolute 0 04 0 00 - 0 20 Thousand/uL    Lymphocytes Absolute 1 03 0 60 - 4 47 Thousands/µL    Monocytes Absolute 0 75 0 17 - 1 22 Thousand/µL    Eosinophils Absolute 0 01 0 00 - 0 61 Thousand/µL    Basophils Absolute 0 05 0 00 - 0 10 Thousands/µL   Fingerstick Glucose (POCT)    Collection Time: 09/21/20  7:36 AM   Result Value Ref Range    POC Glucose 136 65 - 140 mg/dl   Fingerstick Glucose (POCT)    Collection Time: 09/21/20 10:54 AM   Result Value Ref Range    POC Glucose 120 65 - 140 mg/dl   ]  Imaging Studies: I have personally reviewed pertinent reports and I have personally reviewed pertinent films in PACS  EKG, Pathology, and Other Studies: I have personally reviewed pertinent reports      VTE Prophylaxis: Heparin

## 2020-09-22 PROBLEM — N18.5 ACUTE RENAL FAILURE SUPERIMPOSED ON STAGE 5 CHRONIC KIDNEY DISEASE, NOT ON CHRONIC DIALYSIS (HCC): Status: ACTIVE | Noted: 2020-01-01

## 2020-09-22 NOTE — ASSESSMENT & PLAN NOTE
HGB stable 8 9  Most likely secondary to anemia of chronic kidney disease  No obvious signs of bleeding

## 2020-09-22 NOTE — ASSESSMENT & PLAN NOTE
Pre renal in etiology likely secondary to volume depletion from nausea and vomiting and poor oral intake  Received IVF NS 50 cc/hour with significant improvement in creatinine level  Nephrology following    Creatinine baseline at 3 6-4 3

## 2020-09-22 NOTE — ASSESSMENT & PLAN NOTE
Reported dizziness with associated vomiting prior to presentation    MRI brain did not reveal any posterior circulation abnormality  Will benefit from Antivert on DC    Plan  - Antivert 25 mg po Q12

## 2020-09-22 NOTE — CASE MANAGEMENT
CM notified [facility] that pt is being discharged today

## 2020-09-22 NOTE — DISCHARGE SUMMARY
Discharge- Conception Cecil 1940, 79 y o  male MRN: [id number]    Unit/Bed#: -01 Encounter: [id number]    Primary Care Provider: RIKA Alatorre   Date and time admitted to hospital: 9/20/2020  4:08 PM        * Esophageal dysphagia  Assessment & Plan  Presented with difficulty swallowing and foreign body sensation in throat  Most likely secondary to repeated vomiting  Reports no dysphagia since admission  EGD not pursued at this time    Anemia due to chronic kidney disease  Assessment & Plan  HGB stable 8 9  Most likely secondary to anemia of chronic kidney disease  No obvious signs of bleeding  Dizziness  Assessment & Plan  Reported dizziness with associated vomiting prior to presentation  MRI brain did not reveal any posterior circulation abnormality  Will benefit from Antivert on DC    Plan  - Antivert 25 mg po Q12        Acute renal failure superimposed on stage 5 chronic kidney disease, not on chronic dialysis [facility])  Assessment & Plan  Pre renal in etiology likely secondary to volume depletion from nausea and vomiting and poor oral intake  Received IVF NS 50 cc/hour with significant improvement in creatinine level  Nephrology following  Creatinine baseline at 3 6-4 3      Gastroesophageal reflux disease without esophagitis  Assessment & Plan  Continue PPI    Diastolic dysfunction  Assessment & Plan  Echo done in June 2020 was significant for grade 1 diastolic dysfunction  Currently euvolemic at this time    Hold diuretics in the setting of SILVERIO  Monitor I&O  Daily weights    Essential hypertension  Assessment & Plan  Continue nifedipine and metoprolol    Type 2 diabetes mellitus with hyperglycemia, with long-term current use of insulin [facility])  Assessment & Plan  Lab Results   Component Value Date    HGBA1C 6 8 (H) 09/21/2020       Recent Labs     09/21/20  1054 09/21/20  1642 09/21/20  2102 09/22/20  0653   POCGLU 120 211* 223* 71       Blood Sugar Average: Last 72 hrs:  (P) 563 [id number]   Continue home Levemir 5 units q a m  Takes 4 units of lispro b4 meals  Sliding scale insulin    Discharging Resident Physician: Rosario Luong MD  Attending: Hi Guerrero MD  PCP: RIKA Swift  Admission Date: 9/20/2020  Discharge Date: 09/22/20    Disposition:     Home with VNA Services (Reminder: Complete face to face encounter)    Reason for Admission:  Esophageal dysphagia    Consultations During Hospital Stay:  · Gastroenterology  · Nephrology    Primary diagnosis:    Esophageal dysphagia    Secondary diagnosis:   Acute kidney injury on CKD 5   Congestive heart failure   Diabetes mellitus   Hypertension   Hyperlipidemia  · **     Outpatient Tests Requested:  · BNP    Complications:  None    Hospital Course:     Amy Hunter is a 79 y o  male patient who originally presented to the hospital on 9/20/2020 due to due to worsening dysphagia  Initial evaluations with cervical CT was negative for any anatomic abnormality  Presentation blood work was significant for SILVERIO and CKD for which Nephrology was consulted  Kidney function improved with gentle hydration  Also complained of dizziness and vomiting, for which Neurology was consulted  MRI brain was negative for any posterior circulatory deficit  Since admission, patient reported resolution of dysphagia  GI was consulted recommended that EGD will not be pursued at this time  At this time, patient is clinically and hemodynamically stable for discharge  For detailed medical history, kindly review medical record  Condition at Discharge: stable     Discharge Day Visit / Exam:     Subjective:  Patient seen and examined at bedside  Reports overall generalized improvement from presentation status  Denies cough, chest dizziness, dysphagia and diarrhea      Vitals: Blood Pressure: (!) 171/73 (09/22/20 0808)  Pulse: 75 (09/22/20 0808)  Temperature: (!) 97 4 °F (36 3 °C) (09/22/20 0808)  Temp Source: Oral (09/22/20 0500)  Respirations: 18 (09/22/20 0500)  Height: 5' 8" (172 7 cm) (09/20/20 1620)  Weight - Scale: 82 kg (180 lb 12 4 oz) (09/20/20 1620)  SpO2: 97 % (09/22/20 0274)  Exam:   Physical Exam  Vitals signs reviewed  Constitutional:       General: He is not in acute distress  Appearance: He is not toxic-appearing  HENT:      Head: Normocephalic  Cardiovascular:      Rate and Rhythm: Normal rate and regular rhythm  Pulses: Normal pulses  Heart sounds: Normal heart sounds  Pulmonary:      Effort: Pulmonary effort is normal       Breath sounds: Normal breath sounds  Abdominal:      General: Abdomen is flat  Bowel sounds are normal  There is no distension  Tenderness: There is no abdominal tenderness  Musculoskeletal:         General: No deformity  Right lower leg: No edema  Left lower leg: No edema  Skin:     General: Skin is warm  Neurological:      General: No focal deficit present  Mental Status: He is alert and oriented to person, place, and time  Mental status is at baseline  Motor: No weakness  Psychiatric:         Mood and Affect: Mood normal          Behavior: Behavior normal          Thought Content: Thought content normal        Discussion with Family:  Discussed with wife    Discharge instructions/Information to patient and family:   See after visit summary for information provided to patient and family  Provisions for Follow-Up Care:  See after visit summary for information related to follow-up care and any pertinent home health orders  Planned Readmission:  No     Discharge Medications:  See after visit summary for reconciled discharge medications provided to patient and family        ** Please Note: This note has been constructed using a voice recognition system **

## 2020-09-22 NOTE — PROGRESS NOTES
NEPHROLOGY PROGRESS NOTE    Patient: Lin Perez               Sex: male          DOA: 9/20/2020  4:08 PM   YOB: 1940        Age:  79 y o         LOS:  LOS: 1 day   9/22/2020    REASON FOR THE CONSULTATION:      Chronic kidney disease stage 5    SUBJECTIVE     Patient seen and examined next to the bedside  Denies any further episodes of nausea vomiting  CURRENT MEDICATIONS     No current facility-administered medications for this encounter       Current Outpatient Medications:     acetaminophen (TYLENOL) 325 mg tablet, Take 650 mg by mouth every 6 (six) hours as needed , Disp: , Rfl:     aspirin 81 MG tablet, Take 81 mg by mouth daily, Disp: , Rfl:     B Complex-C-Folic Acid (TRIPHROCAPS) 1 MG CAPS, Take 1 capsule (1 mg total) by mouth daily with dinner, Disp: 30 each, Rfl: 5    BD INSULIN SYRINGE U/F 30G X 1/2" 0 3 ML MISC, , Disp: , Rfl:     cholecalciferol (VITAMIN D3) 1,000 units tablet, Take 1,000 Units by mouth daily, Disp: , Rfl:     cholestyramine sugar free (QUESTRAN LIGHT) 4 g packet, Take 1 packet (4 g total) by mouth 2 (two) times a day, Disp: 60 packet, Rfl: 0    colchicine (COLCRYS) 0 6 mg tablet, Take 1 tablet (0 6 mg total) by mouth daily, Disp: 90 tablet, Rfl: 3    famotidine (PEPCID) 10 mg tablet, Take 1 tablet (10 mg total) by mouth daily at bedtime, Disp: 30 tablet, Rfl: 0    furosemide (LASIX) 40 mg tablet, Take 1 tablet (40 mg total) by mouth daily , do NOT take if you do not have swelling, Disp: 30 tablet, Rfl: 5    insulin detemir (Levemir) 100 units/mL subcutaneous injection, Inject 5 Units under the skin daily before breakfast, Disp: , Rfl: 0    insulin lispro (HumaLOG) 100 units/mL injection, Inject 4 Units under the skin 3 (three) times a day before meals, Disp: , Rfl: 0    Insulin Syringe-Needle U-100 30G X 1/2" 1 ML MISC, by Does not apply route 3 (three) times a day, Disp: 100 each, Rfl: 5    magnesium oxide (MAG-OX) 400 mg, Take 1 tablet (400 mg total) by mouth 2 (two) times a day, Disp: 60 tablet, Rfl: 5    nystatin (MYCOSTATIN) powder, Apply topically 2 (two) times a day, Disp: 15 g, Rfl: 0    pantoprazole (PROTONIX) 40 mg tablet, Take 1 tablet (40 mg total) by mouth 2 (two) times a day, Disp: 90 tablet, Rfl: 0    sodium bicarbonate 650 mg tablet, Take 650 mg by mouth 3 (three) times a day before meals, Disp: , Rfl:     sucralfate (CARAFATE) 1 g/10 mL suspension, Take 10 mL (1,000 mg total) by mouth every 6 (six) hours, Disp: 420 mL, Rfl: 0    tamsulosin (FLOMAX) 0 4 mg, Take 1 capsule (0 4 mg total) by mouth daily with dinner, Disp: 90 capsule, Rfl: 3    meclizine (ANTIVERT) 25 mg tablet, Take 1 tablet (25 mg total) by mouth every 12 (twelve) hours as needed for dizziness, Disp: 60 tablet, Rfl: 0    NIFEdipine ER (ADALAT CC) 60 MG 24 hr tablet, Take 1 tablet (60 mg total) by mouth daily, Disp: 30 tablet, Rfl: 3    REVIEW OF SYSTEMS     Review of Systems   Constitutional: Negative  HENT: Negative  Eyes: Negative  Respiratory: Negative  Cardiovascular: Negative  Gastrointestinal: Negative  Endocrine: Negative  Genitourinary: Negative  Musculoskeletal: Negative  Skin: Negative  Allergic/Immunologic: Negative  Neurological: Negative  Hematological: Negative  All other systems reviewed and are negative  OBJECTIVE     Current Weight: Weight - Scale: 82 kg (180 lb 12 4 oz)  Vitals:    09/22/20 0808   BP: (!) 171/73   Pulse: 75   Resp:    Temp: (!) 97 4 °F (36 3 °C)   SpO2: 97%     Body mass index is 27 49 kg/m²  Intake/Output Summary (Last 24 hours) at 9/22/2020 1410  Last data filed at 9/22/2020 1217  Gross per 24 hour   Intake 240 ml   Output 1300 ml   Net -1060 ml       PHYSICAL EXAMINATION     Physical Exam  HENT:      Head: Normocephalic and atraumatic  Eyes:      Pupils: Pupils are equal, round, and reactive to light  Neck:      Musculoskeletal: Neck supple  Vascular: No JVD     Cardiovascular: Rate and Rhythm: Normal rate and regular rhythm  Heart sounds: Normal heart sounds  No murmur  No friction rub  Pulmonary:      Effort: Pulmonary effort is normal       Breath sounds: Normal breath sounds  Abdominal:      General: Bowel sounds are normal  There is no distension  Palpations: Abdomen is soft  Tenderness: There is no abdominal tenderness  There is no rebound  Musculoskeletal:         General: No tenderness  Skin:     General: Skin is dry  Findings: No rash  Neurological:      Mental Status: He is alert and oriented to person, place, and time  LAB RESULTS     Results from last 7 days   Lab Units 09/22/20  0533 09/21/20  0545 09/20/20  1630   WBC Thousand/uL 5 14 7 34 10 32*   HEMOGLOBIN g/dL 8 9* 8 4* 11 2*   HEMATOCRIT % 27 5* 26 2* 34 3*   PLATELETS Thousands/uL 138* 128* 179   POTASSIUM mmol/L 4 0 4 0 3 9   CHLORIDE mmol/L 108 106 101   CO2 mmol/L 26 25 24   BUN mg/dL 44* 45* 46*   CREATININE mg/dL 4 36* 4 40* 4 72*   EGFR ml/min/1 73sq m 12 12 11   CALCIUM mg/dL 8 6 8 3 9 0           RADIOLOGY RESULTS      Results for orders placed during the hospital encounter of 09/20/20   XR chest portable    Narrative CHEST     INDICATION:   r/o pneumonia  COMPARISON:  Chest radiograph from 8/6/2020 and abdomen CT from 8/9/2020  EXAM PERFORMED/VIEWS:  XR CHEST PORTABLE      FINDINGS:    Cardiomediastinal silhouette appears unremarkable  The lungs are clear  No pneumothorax or pleural effusion  Osseous structures appear within normal limits for patient age  Impression No acute cardiopulmonary disease  Workstation performed: [id number]       Results for orders placed during the hospital encounter of 04/21/20   XR chest pa & lateral    Narrative CHEST     INDICATION:   questionable aspiration event  COMPARISON:  One view chest 4/21/2020    EXAM PERFORMED/VIEWS:  XR CHEST PA & LATERAL      FINDINGS:    Normal cardiac silhouette    Aortic calcification is present  Minimal residual albeit decreased left basilar subsegmental atelectasis  No pneumothorax, pulmonary edema, or gross pleural effusion  Degenerative changes bilateral shoulders  Partially visualized left nephroureteral stent  Impression Minimal residual albeit decreased left basilar subsegmental atelectasis  No other significant interval change  Workstation performed: [id number]         ASSESSMENT/PLAN     29-year-old male with past medical history of chronic kidney disease stage 5, metastatic neuroendocrine tumor, hypertension, anemia who presented with intractable nausea and vomiting 24 hours preceding admission  1  Chronic kidney disease stage 5:  Baseline serum creatinine of 3 5-4 3   -presented with serum creatinine 4 72 and elevated  -current creatinine is 4 3 and improved  -no indication for renal placement therapy today  2  Hypertension due to CKD stage 5:  Blood pressure improved at 537 systolic upon discharge  3  Nausea and vomiting:  Etiology unknown  Now resolved  Unlikely to be a uremic symptom  4  Anemia due to CKD:  Hemoglobin below target 8 9 today  5  Metastatic neuroendocrine tumor: patient scheduled for Lanreotide injection today        Linnette Officer, MD  Nephrology  9/22/2020

## 2020-09-22 NOTE — UTILIZATION REVIEW
Notification of Inpatient Admission/Inpatient Authorization Request   This is a Notification of Inpatient Admission for [address]  Be advised that this patient was admitted to our facility under Inpatient Status  Contact Ladi Villanueva at [phone] for additional admission information  [facility] DEPT DEDICATED Onesimo Farmer [phone]  Patient Name:   Jami Ivy   YOB: 1940       [address]   [address]:   [address]   Tax ID: [id number]  NPI: [id number] Attending Provider/NPI:  Phone:  Address: Brandy Lezama [[id number]]  [phone]  Same as Facility   Place of Service Code: 24     Place of Service Name:  [address] [place] [address]   Start Date: 9/21/20 1823 Discharge Date & Time: No discharge date for patient encounter  Type of Admission: Inpatient Status Discharge Disposition   (if discharged): Home with [address] [facility]   Patient Diagnoses: Esophageal dysphagia [R13 10]  Dizziness [R42]  Vomiting [R11 10]  Acute renal failure superimposed on stage 4 chronic kidney disease ([facility] [address] ) [N17 9, N18 4]  Nausea and vomiting, intractability of vomiting not specified, unspecified vomiting type [R11 2]     Orders: Admission Orders (From admission, onward)     Ordered        09/21/20 1823  Inpatient Admission  Once         09/20/20 2009  Place in Observation  Once                    Assigned Utilization Review Contact: Ladi Villanueva  Utilization   Network Utilization Review Department  Phone: [phone]; Fax [phone]  Email: [email or web address]  [email or web address]   ATTENTION PAYERS: Please call the assigned Utilization  directly with any questions or concerns ALL voicemails in the department are confidential  Send all requests for admission clinical reviews, approved or denied determinations and any other requests to dedicated fax number belonging to the campus where the patient is receiving treatment

## 2020-09-22 NOTE — DISCHARGE INSTR - AVS FIRST PAGE
DISCHARGE INSTRUCTIONS FROM HOSPITALIST   1  Follow up with your primary care physician in one week  in regards to recent hospitalization       Follow-up with your primary nephrologist in the next 3 days with results of blood work in regards to recent hospitalization      Follow-up with your primary oncologist today      Follow-up with with pulmonology as outpatient for sleep studies  2  Take medications regularly       Antivert 25 mg by mouth twice daily for dizziness  3  Come back to the ER if symptoms recur or worsen   4  Activity as tolerated  5   Diet : cardiac/renal healthy

## 2020-09-22 NOTE — PLAN OF CARE
Problem: Potential for Falls  Goal: Patient will remain free of falls  Description: INTERVENTIONS:  - Assess patient frequently for physical needs  -  Identify cognitive and physical deficits and behaviors that affect risk of falls  -  [place] fall precautions as indicated by assessment   - Educate patient/family on patient safety including physical limitations  - Instruct patient to call for assistance with activity based on assessment  - Modify environment to reduce risk of injury  - Consider OT/PT consult to assist with strengthening/mobility  Outcome: Adequate for Discharge     Problem: PAIN - ADULT  Goal: Verbalizes/displays adequate comfort level or baseline comfort level  Description: Interventions:  - Encourage patient to monitor pain and request assistance  - Assess pain using appropriate pain scale  - Administer analgesics based on type and severity of pain and evaluate response  - Implement non-pharmacological measures as appropriate and evaluate response  - Consider cultural and social influences on pain and pain management  - Notify physician/advanced practitioner if interventions unsuccessful or patient reports new pain  Outcome: Adequate for Discharge     Problem: SAFETY ADULT  Goal: Patient will remain free of falls  Description: INTERVENTIONS:  - Assess patient frequently for physical needs  -  Identify cognitive and physical deficits and behaviors that affect risk of falls    -  [place] fall precautions as indicated by assessment   - Educate patient/family on patient safety including physical limitations  - Instruct patient to call for assistance with activity based on assessment  - Modify environment to reduce risk of injury  - Consider OT/PT consult to assist with strengthening/mobility  Outcome: Adequate for Discharge     Problem: DISCHARGE PLANNING  Goal: Discharge to home or other facility with appropriate resources  Description: INTERVENTIONS:  - Identify barriers to discharge w/patient and caregiver  - Arrange for needed discharge resources and transportation as appropriate  - Identify discharge learning needs (meds, wound care, etc )  - Arrange for interpretive services to assist at discharge as needed  - Refer to Case Management Department for coordinating discharge planning if the patient needs post-hospital services based on physician/advanced practitioner order or complex needs related to functional status, cognitive ability, or social support system  Outcome: Adequate for Discharge     Problem: Knowledge Deficit  Goal: Patient/family/caregiver demonstrates understanding of disease process, treatment plan, medications, and discharge instructions  Description: Complete learning assessment and assess knowledge base    Interventions:  - Provide teaching at level of understanding  - Provide teaching via preferred learning methods  Outcome: Adequate for Discharge     Problem: GASTROINTESTINAL - ADULT  Goal: Minimal or absence of nausea and/or vomiting  Description: INTERVENTIONS:  - Administer IV fluids if ordered to ensure adequate hydration  - Maintain NPO status until nausea and vomiting are resolved  - Nasogastric tube if ordered  - Administer ordered antiemetic medications as needed  - Provide nonpharmacologic comfort measures as appropriate  - Advance diet as tolerated, if ordered  - Consider nutrition services referral to assist patient with adequate nutrition and appropriate food choices  Outcome: Adequate for Discharge  Goal: Maintains or returns to baseline bowel function  Description: INTERVENTIONS:  - Assess bowel function  - Encourage oral fluids to ensure adequate hydration  - Administer IV fluids if ordered to ensure adequate hydration  - Administer ordered medications as needed  - Encourage mobilization and activity  - Consider nutritional services referral to assist patient with adequate nutrition and appropriate food choices  Outcome: Adequate for Discharge  Goal: Maintains adequate nutritional intake  Description: INTERVENTIONS:  - Monitor percentage of each meal consumed  - Identify factors contributing to decreased intake, treat as appropriate  - Assist with meals as needed  - Monitor I&O, weight, and lab values if indicated  - Obtain nutrition services referral as needed  Outcome: Adequate for Discharge     Problem: Prexisting or High Potential for Compromised Skin Integrity  Goal: Skin integrity is maintained or improved  Description: INTERVENTIONS:  - Identify patients at risk for skin breakdown  - Assess and monitor skin integrity  - Assess and monitor nutrition and hydration status  - Monitor labs   - Assess for incontinence   - Turn and reposition patient  - Assist with mobility/ambulation  - Relieve pressure over bony prominences  - Avoid friction and shearing  - Provide appropriate hygiene as needed including keeping skin clean and dry  - Evaluate need for skin moisturizer/barrier cream  - Collaborate with interdisciplinary team   - Patient/family teaching  - Consider wound care consult   Outcome: Adequate for Discharge

## 2020-09-22 NOTE — PLAN OF CARE
Problem: Potential for Falls  Goal: Patient will remain free of falls  Description: INTERVENTIONS:  - Assess patient frequently for physical needs  -  Identify cognitive and physical deficits and behaviors that affect risk of falls  -  [place] fall precautions as indicated by assessment   - Educate patient/family on patient safety including physical limitations  - Instruct patient to call for assistance with activity based on assessment  - Modify environment to reduce risk of injury  - Consider OT/PT consult to assist with strengthening/mobility  Outcome: Progressing     Problem: PAIN - ADULT  Goal: Verbalizes/displays adequate comfort level or baseline comfort level  Description: Interventions:  - Encourage patient to monitor pain and request assistance  - Assess pain using appropriate pain scale  - Administer analgesics based on type and severity of pain and evaluate response  - Implement non-pharmacological measures as appropriate and evaluate response  - Consider cultural and social influences on pain and pain management  - Notify physician/advanced practitioner if interventions unsuccessful or patient reports new pain  Outcome: Progressing     Problem: SAFETY ADULT  Goal: Patient will remain free of falls  Description: INTERVENTIONS:  - Assess patient frequently for physical needs  -  Identify cognitive and physical deficits and behaviors that affect risk of falls    -  [place] fall precautions as indicated by assessment   - Educate patient/family on patient safety including physical limitations  - Instruct patient to call for assistance with activity based on assessment  - Modify environment to reduce risk of injury  - Consider OT/PT consult to assist with strengthening/mobility  Outcome: Progressing     Problem: DISCHARGE PLANNING  Goal: Discharge to home or other facility with appropriate resources  Description: INTERVENTIONS:  - Identify barriers to discharge w/patient and caregiver  - Arrange for needed discharge resources and transportation as appropriate  - Identify discharge learning needs (meds, wound care, etc )  - Arrange for interpretive services to assist at discharge as needed  - Refer to Case Management Department for coordinating discharge planning if the patient needs post-hospital services based on physician/advanced practitioner order or complex needs related to functional status, cognitive ability, or social support system  Outcome: Progressing

## 2020-09-22 NOTE — ASSESSMENT & PLAN NOTE
Lab Results   Component Value Date    HGBA1C 6 8 (H) 09/21/2020       Recent Labs     09/21/20  1054 09/21/20  1642 09/21/20  2102 09/22/20  0653   POCGLU 120 211* 223* 71       Blood Sugar Average: Last 72 hrs:  (P) 162 [id number]   Continue home Levemir 5 units q a m    Takes 4 units of lispro b4 meals  Sliding scale insulin

## 2020-09-22 NOTE — TELEPHONE ENCOUNTER
Called patient to schedule his hospital follow up  Patient said he already has a neurologist and will follow up with them

## 2020-09-22 NOTE — ASSESSMENT & PLAN NOTE
Echo done in June 2020 was significant for grade 1 diastolic dysfunction  Currently euvolemic at this time    Hold diuretics in the setting of SILVERIO  Monitor I&O  Daily weights

## 2020-09-22 NOTE — ASSESSMENT & PLAN NOTE
Presented with difficulty swallowing and foreign body sensation in throat  Most likely secondary to repeated vomiting  Reports no dysphagia since admission    EGD not pursued at this time

## 2020-09-22 NOTE — PROGRESS NOTES
Pt to the unit for Lanreotide injection  Voices no new concerns  Lanreotide 120 mg given SQ in the right upper outer glute  Pt tolerated without adverse reaction  AVS given, aware of future appointments  Voices no questions or concerns at discharge

## 2020-09-23 NOTE — UTILIZATION REVIEW
Notification of Discharge  This is a Notification of Discharge from our facility [address]  Please be advised that this patient has been discharge from our facility  Below you will find the admission and discharge date and time including the patients disposition  PRESENTATION DATE: 9/20/2020  4:08 PM  OBS ADMISSION DATE: 09/20/2020  IP ADMISSION DATE: 9/21/20 1823   DISCHARGE DATE: 9/22/2020 12:27 PM  DIS[facility]   Admission Orders listed below:  Admission Orders (From admission, onward)     Ordered        09/21/20 1823  Inpatient Admission  Once         09/20/20 2009  Place in Observation  Once                   Please contact the UR Department if additional information is required to close this patient's authorization/case  [facility] Utilization Review Department  Main: [phone] x carefully listen to the prompts  All voicemails are confidential   [email or web address]  Send all requests for admission clinical reviews, approved or denied determinations and any other requests to dedicated fax number below belonging to the campus where the patient is receiving treatment   List of dedicated fax numbers:  [address] DENIALS (Administrative/Medical Necessity) [phone]   [address] (Maternity/NICU/Pediatrics) [phone]   Joyce Ordonez [phone]   [address] [facility] [phone]   Vimal Draper [phone]   [facility] [address] [facility] [phone]   [facility]  [phone]   [address] [facility] [address]  [address] [facility] [place][note 2] [address] [facility] [phone]

## 2020-09-30 PROBLEM — N18.5 CKD (CHRONIC KIDNEY DISEASE) STAGE 5, GFR LESS THAN 15 ML/MIN (HCC): Status: ACTIVE | Noted: 2020-01-01

## 2020-09-30 PROBLEM — R55 SYNCOPE: Status: ACTIVE | Noted: 2020-01-01

## 2020-10-14 PROBLEM — N18.5 ACUTE RENAL FAILURE SUPERIMPOSED ON STAGE 5 CHRONIC KIDNEY DISEASE, NOT ON CHRONIC DIALYSIS (HCC): Status: RESOLVED | Noted: 2020-01-01 | Resolved: 2020-01-01

## 2020-10-14 PROBLEM — N17.9 ACUTE RENAL FAILURE SUPERIMPOSED ON STAGE 5 CHRONIC KIDNEY DISEASE, NOT ON CHRONIC DIALYSIS (HCC): Status: RESOLVED | Noted: 2020-01-01 | Resolved: 2020-01-01

## 2020-10-14 PROBLEM — N30.00 ACUTE CYSTITIS WITHOUT HEMATURIA: Status: RESOLVED | Noted: 2019-02-03 | Resolved: 2020-01-01

## 2020-10-14 PROBLEM — I12.0 HYPERTENSIVE KIDNEY DISEASE WITH STAGE 5 CHRONIC KIDNEY DISEASE, NOT ON CHRONIC DIALYSIS (HCC): Status: ACTIVE | Noted: 2020-01-01

## 2020-10-14 PROBLEM — N18.5 HYPERTENSIVE KIDNEY DISEASE WITH STAGE 5 CHRONIC KIDNEY DISEASE, NOT ON CHRONIC DIALYSIS (HCC): Status: ACTIVE | Noted: 2020-01-01

## 2020-11-05 PROBLEM — H25.11 AGE-RELATED NUCLEAR CATARACT, RIGHT: Status: ACTIVE | Noted: 2020-01-01

## 2020-11-05 PROBLEM — H25.11 AGE-RELATED NUCLEAR CATARACT, RIGHT: Status: RESOLVED | Noted: 2020-01-01 | Resolved: 2020-01-01

## 2020-12-14 PROBLEM — R65.10 SIRS (SYSTEMIC INFLAMMATORY RESPONSE SYNDROME) (HCC): Status: ACTIVE | Noted: 2020-01-01

## 2021-01-01 ENCOUNTER — TELEPHONE (OUTPATIENT)
Dept: FAMILY MEDICINE CLINIC | Facility: CLINIC | Age: 81
End: 2021-01-01

## 2021-01-01 ENCOUNTER — HOSPITAL ENCOUNTER (OUTPATIENT)
Dept: INFUSION CENTER | Facility: HOSPITAL | Age: 81
Discharge: HOME | End: 2021-01-12
Payer: COMMERCIAL

## 2021-01-01 ENCOUNTER — PATIENT OUTREACH (OUTPATIENT)
Dept: FAMILY MEDICINE CLINIC | Facility: CLINIC | Age: 81
End: 2021-01-01

## 2021-01-01 ENCOUNTER — OFFICE VISIT (OUTPATIENT)
Dept: HEMATOLOGY ONCOLOGY | Facility: CLINIC | Age: 81
End: 2021-01-01
Payer: COMMERCIAL

## 2021-01-01 ENCOUNTER — LAB (OUTPATIENT)
Dept: LAB | Facility: CLINIC | Age: 81
End: 2021-01-01
Payer: COMMERCIAL

## 2021-01-01 ENCOUNTER — APPOINTMENT (EMERGENCY)
Dept: RADIOLOGY | Facility: HOSPITAL | Age: 81
DRG: 683 | End: 2021-01-01
Payer: COMMERCIAL

## 2021-01-01 ENCOUNTER — TRANSITIONAL CARE MANAGEMENT (OUTPATIENT)
Dept: FAMILY MEDICINE CLINIC | Facility: CLINIC | Age: 81
End: 2021-01-01

## 2021-01-01 ENCOUNTER — TELEPHONE (OUTPATIENT)
Dept: NEPHROLOGY | Facility: CLINIC | Age: 81
End: 2021-01-01

## 2021-01-01 ENCOUNTER — OFFICE VISIT (OUTPATIENT)
Dept: NEPHROLOGY | Facility: CLINIC | Age: 81
End: 2021-01-01
Payer: COMMERCIAL

## 2021-01-01 ENCOUNTER — HOSPITAL ENCOUNTER (OUTPATIENT)
Dept: INFUSION CENTER | Facility: HOSPITAL | Age: 81
Discharge: HOME | DRG: 683 | End: 2021-03-18
Attending: INTERNAL MEDICINE
Payer: COMMERCIAL

## 2021-01-01 ENCOUNTER — HOSPITAL ENCOUNTER (INPATIENT)
Facility: HOSPITAL | Age: 81
LOS: 7 days | Discharge: HOME HEALTH | DRG: 177 | End: 2021-01-31
Attending: EMERGENCY MEDICINE | Admitting: INTERNAL MEDICINE
Payer: COMMERCIAL

## 2021-01-01 ENCOUNTER — PATIENT OUTREACH (OUTPATIENT)
Dept: CASE MANAGEMENT | Facility: OTHER | Age: 81
End: 2021-01-01

## 2021-01-01 ENCOUNTER — HOSPITAL ENCOUNTER (OUTPATIENT)
Dept: INFUSION CENTER | Facility: HOSPITAL | Age: 81
Discharge: HOME | End: 2021-04-15
Attending: INTERNAL MEDICINE

## 2021-01-01 ENCOUNTER — APPOINTMENT (EMERGENCY)
Dept: CT IMAGING | Facility: HOSPITAL | Age: 81
DRG: 177 | End: 2021-01-01
Payer: COMMERCIAL

## 2021-01-01 ENCOUNTER — APPOINTMENT (EMERGENCY)
Dept: RADIOLOGY | Facility: HOSPITAL | Age: 81
DRG: 644 | End: 2021-01-01
Payer: COMMERCIAL

## 2021-01-01 ENCOUNTER — HOSPITAL ENCOUNTER (OUTPATIENT)
Dept: INFUSION CENTER | Facility: HOSPITAL | Age: 81
Discharge: HOME | End: 2021-02-09
Attending: INTERNAL MEDICINE

## 2021-01-01 ENCOUNTER — OFFICE VISIT (OUTPATIENT)
Dept: FAMILY MEDICINE CLINIC | Facility: CLINIC | Age: 81
End: 2021-01-01
Payer: COMMERCIAL

## 2021-01-01 ENCOUNTER — HOSPITAL ENCOUNTER (INPATIENT)
Facility: HOSPITAL | Age: 81
LOS: 2 days | Discharge: HOME HEALTH | DRG: 644 | End: 2021-03-11
Attending: EMERGENCY MEDICINE | Admitting: INTERNAL MEDICINE
Payer: COMMERCIAL

## 2021-01-01 ENCOUNTER — TRANSCRIBE ORDERS (OUTPATIENT)
Dept: LAB | Facility: CLINIC | Age: 81
End: 2021-01-01

## 2021-01-01 ENCOUNTER — HOSPITAL ENCOUNTER (OUTPATIENT)
Dept: INFUSION CENTER | Facility: HOSPITAL | Age: 81
Discharge: HOME | End: 2021-01-13
Attending: INTERNAL MEDICINE

## 2021-01-01 ENCOUNTER — TELEPHONE (OUTPATIENT)
Dept: HEMATOLOGY ONCOLOGY | Facility: CLINIC | Age: 81
End: 2021-01-01

## 2021-01-01 ENCOUNTER — APPOINTMENT (INPATIENT)
Dept: INTERVENTIONAL RADIOLOGY/VASCULAR | Facility: HOSPITAL | Age: 81
DRG: 177 | End: 2021-01-01
Attending: RADIOLOGY
Payer: COMMERCIAL

## 2021-01-01 ENCOUNTER — HOSPITAL ENCOUNTER (OUTPATIENT)
Dept: INFUSION CENTER | Facility: HOSPITAL | Age: 81
Discharge: HOME | End: 2021-02-17
Attending: INTERNAL MEDICINE
Payer: COMMERCIAL

## 2021-01-01 ENCOUNTER — HOSPITAL ENCOUNTER (INPATIENT)
Facility: HOSPITAL | Age: 81
LOS: 6 days | Discharge: HOME HEALTH | DRG: 698 | End: 2021-02-15
Attending: EMERGENCY MEDICINE | Admitting: STUDENT IN AN ORGANIZED HEALTH CARE EDUCATION/TRAINING PROGRAM
Payer: COMMERCIAL

## 2021-01-01 ENCOUNTER — APPOINTMENT (EMERGENCY)
Dept: CT IMAGING | Facility: HOSPITAL | Age: 81
DRG: 644 | End: 2021-01-01
Payer: COMMERCIAL

## 2021-01-01 ENCOUNTER — APPOINTMENT (INPATIENT)
Dept: RADIOLOGY | Facility: HOSPITAL | Age: 81
DRG: 177 | End: 2021-01-01
Payer: COMMERCIAL

## 2021-01-01 ENCOUNTER — TELEMEDICINE (OUTPATIENT)
Dept: NEPHROLOGY | Facility: CLINIC | Age: 81
End: 2021-01-01
Payer: COMMERCIAL

## 2021-01-01 ENCOUNTER — HOSPITAL ENCOUNTER (INPATIENT)
Facility: HOSPITAL | Age: 81
LOS: 2 days | Discharge: HOME HEALTH | DRG: 683 | End: 2021-03-20
Attending: EMERGENCY MEDICINE | Admitting: HOSPITALIST
Payer: COMMERCIAL

## 2021-01-01 ENCOUNTER — HOSPITAL ENCOUNTER (OUTPATIENT)
Dept: INFUSION CENTER | Facility: HOSPITAL | Age: 81
Discharge: HOME | End: 2021-02-24
Payer: COMMERCIAL

## 2021-01-01 ENCOUNTER — APPOINTMENT (INPATIENT)
Dept: NUCLEAR MEDICINE | Facility: HOSPITAL | Age: 81
DRG: 683 | End: 2021-01-01
Payer: COMMERCIAL

## 2021-01-01 ENCOUNTER — APPOINTMENT (EMERGENCY)
Dept: RADIOLOGY | Facility: HOSPITAL | Age: 81
DRG: 698 | End: 2021-01-01
Payer: COMMERCIAL

## 2021-01-01 VITALS
HEIGHT: 68 IN | OXYGEN SATURATION: 97 % | BODY MASS INDEX: 24.36 KG/M2 | DIASTOLIC BLOOD PRESSURE: 64 MMHG | TEMPERATURE: 97.9 F | WEIGHT: 160.72 LBS | SYSTOLIC BLOOD PRESSURE: 138 MMHG | HEART RATE: 67 BPM | RESPIRATION RATE: 20 BRPM

## 2021-01-01 VITALS
WEIGHT: 140 LBS | BODY MASS INDEX: 21.22 KG/M2 | OXYGEN SATURATION: 97 % | HEART RATE: 74 BPM | SYSTOLIC BLOOD PRESSURE: 125 MMHG | DIASTOLIC BLOOD PRESSURE: 80 MMHG | HEIGHT: 68 IN

## 2021-01-01 VITALS — HEART RATE: 93 BPM | TEMPERATURE: 97.2 F | DIASTOLIC BLOOD PRESSURE: 68 MMHG | SYSTOLIC BLOOD PRESSURE: 117 MMHG

## 2021-01-01 VITALS
OXYGEN SATURATION: 96 % | HEART RATE: 64 BPM | TEMPERATURE: 97.2 F | SYSTOLIC BLOOD PRESSURE: 120 MMHG | RESPIRATION RATE: 18 BRPM | DIASTOLIC BLOOD PRESSURE: 56 MMHG

## 2021-01-01 VITALS
WEIGHT: 149.03 LBS | TEMPERATURE: 99.3 F | BODY MASS INDEX: 22.59 KG/M2 | DIASTOLIC BLOOD PRESSURE: 65 MMHG | SYSTOLIC BLOOD PRESSURE: 141 MMHG | HEART RATE: 89 BPM | RESPIRATION RATE: 18 BRPM | OXYGEN SATURATION: 96 % | HEIGHT: 68 IN

## 2021-01-01 VITALS
SYSTOLIC BLOOD PRESSURE: 136 MMHG | WEIGHT: 160 LBS | BODY MASS INDEX: 24.25 KG/M2 | OXYGEN SATURATION: 99 % | HEIGHT: 68 IN | HEART RATE: 61 BPM | DIASTOLIC BLOOD PRESSURE: 74 MMHG

## 2021-01-01 VITALS
OXYGEN SATURATION: 94 % | TEMPERATURE: 98.6 F | WEIGHT: 154 LBS | HEIGHT: 68 IN | BODY MASS INDEX: 23.34 KG/M2 | HEART RATE: 76 BPM

## 2021-01-01 VITALS
DIASTOLIC BLOOD PRESSURE: 63 MMHG | OXYGEN SATURATION: 99 % | TEMPERATURE: 97.3 F | HEART RATE: 68 BPM | RESPIRATION RATE: 18 BRPM | SYSTOLIC BLOOD PRESSURE: 110 MMHG

## 2021-01-01 VITALS
WEIGHT: 154.54 LBS | HEIGHT: 68 IN | OXYGEN SATURATION: 99 % | RESPIRATION RATE: 18 BRPM | TEMPERATURE: 97.9 F | DIASTOLIC BLOOD PRESSURE: 72 MMHG | SYSTOLIC BLOOD PRESSURE: 136 MMHG | HEART RATE: 81 BPM | BODY MASS INDEX: 23.42 KG/M2

## 2021-01-01 VITALS
WEIGHT: 168.3 LBS | SYSTOLIC BLOOD PRESSURE: 110 MMHG | HEART RATE: 94 BPM | TEMPERATURE: 96.7 F | HEIGHT: 67 IN | DIASTOLIC BLOOD PRESSURE: 56 MMHG | BODY MASS INDEX: 26.42 KG/M2 | RESPIRATION RATE: 18 BRPM | OXYGEN SATURATION: 98 %

## 2021-01-01 VITALS
TEMPERATURE: 98.6 F | RESPIRATION RATE: 18 BRPM | DIASTOLIC BLOOD PRESSURE: 89 MMHG | HEART RATE: 88 BPM | SYSTOLIC BLOOD PRESSURE: 169 MMHG

## 2021-01-01 VITALS
OXYGEN SATURATION: 97 % | DIASTOLIC BLOOD PRESSURE: 82 MMHG | HEART RATE: 65 BPM | SYSTOLIC BLOOD PRESSURE: 200 MMHG | BODY MASS INDEX: 24.33 KG/M2 | HEIGHT: 68 IN | TEMPERATURE: 98.6 F

## 2021-01-01 VITALS
HEART RATE: 73 BPM | OXYGEN SATURATION: 99 % | TEMPERATURE: 97.7 F | RESPIRATION RATE: 18 BRPM | DIASTOLIC BLOOD PRESSURE: 66 MMHG | SYSTOLIC BLOOD PRESSURE: 152 MMHG

## 2021-01-01 VITALS
HEART RATE: 106 BPM | WEIGHT: 159 LBS | DIASTOLIC BLOOD PRESSURE: 84 MMHG | HEIGHT: 67 IN | SYSTOLIC BLOOD PRESSURE: 144 MMHG | OXYGEN SATURATION: 99 % | BODY MASS INDEX: 24.96 KG/M2

## 2021-01-01 VITALS
HEIGHT: 68 IN | SYSTOLIC BLOOD PRESSURE: 191 MMHG | OXYGEN SATURATION: 96 % | HEART RATE: 78 BPM | DIASTOLIC BLOOD PRESSURE: 87 MMHG | TEMPERATURE: 97.3 F | WEIGHT: 159.39 LBS | BODY MASS INDEX: 24.16 KG/M2 | RESPIRATION RATE: 18 BRPM

## 2021-01-01 VITALS — TEMPERATURE: 96.9 F

## 2021-01-01 DIAGNOSIS — E87.6 ACUTE HYPOKALEMIA: Chronic | ICD-10-CM

## 2021-01-01 DIAGNOSIS — N40.0 HYPERPLASIA OF PROSTATE: ICD-10-CM

## 2021-01-01 DIAGNOSIS — R18.8 ASCITES: ICD-10-CM

## 2021-01-01 DIAGNOSIS — I10 ESSENTIAL HYPERTENSION: ICD-10-CM

## 2021-01-01 DIAGNOSIS — I48.91 ATRIAL FIBRILLATION, UNSPECIFIED TYPE (HCC): ICD-10-CM

## 2021-01-01 DIAGNOSIS — J90 CHRONIC BILATERAL PLEURAL EFFUSIONS: ICD-10-CM

## 2021-01-01 DIAGNOSIS — R53.1 GENERALIZED WEAKNESS: ICD-10-CM

## 2021-01-01 DIAGNOSIS — D3A.8 NEUROENDOCRINE TUMOR: ICD-10-CM

## 2021-01-01 DIAGNOSIS — D3A.8 NEUROENDOCRINE TUMOR: Chronic | ICD-10-CM

## 2021-01-01 DIAGNOSIS — I12.0 HYPERTENSIVE KIDNEY DISEASE WITH STAGE 5 CHRONIC KIDNEY DISEASE, NOT ON CHRONIC DIALYSIS (HCC): ICD-10-CM

## 2021-01-01 DIAGNOSIS — C7B.8 NEUROENDOCRINE CARCINOMA METASTATIC TO LIVER (HCC): Primary | ICD-10-CM

## 2021-01-01 DIAGNOSIS — Z86.19 HISTORY OF CLOSTRIDIOIDES DIFFICILE INFECTION: ICD-10-CM

## 2021-01-01 DIAGNOSIS — E34.0 CARCINOID SYNDROME (HCC): Primary | ICD-10-CM

## 2021-01-01 DIAGNOSIS — C7A.8 MALIGNANT NEUROENDOCRINE NEOPLASM (HCC): Primary | ICD-10-CM

## 2021-01-01 DIAGNOSIS — C7A.8 MALIGNANT NEUROENDOCRINE NEOPLASM (HCC): ICD-10-CM

## 2021-01-01 DIAGNOSIS — Z79.4 TYPE 2 DIABETES MELLITUS WITH HYPERGLYCEMIA, WITH LONG-TERM CURRENT USE OF INSULIN (HCC): ICD-10-CM

## 2021-01-01 DIAGNOSIS — C7B.8 NEUROENDOCRINE CARCINOMA METASTATIC TO LIVER (HCC): Chronic | ICD-10-CM

## 2021-01-01 DIAGNOSIS — N25.81 SECONDARY HYPERPARATHYROIDISM OF RENAL ORIGIN (HCC): ICD-10-CM

## 2021-01-01 DIAGNOSIS — I74.3 EMBOLISM AND THROMBOSIS OF ARTERIES OF THE LOWER EXTREMITIES (HCC): ICD-10-CM

## 2021-01-01 DIAGNOSIS — Z76.89 ENCOUNTER FOR SUPPORT AND COORDINATION OF TRANSITION OF CARE: Primary | ICD-10-CM

## 2021-01-01 DIAGNOSIS — N18.5 STAGE 5 CHRONIC KIDNEY DISEASE NOT ON CHRONIC DIALYSIS (HCC): Primary | ICD-10-CM

## 2021-01-01 DIAGNOSIS — E34.0 CARCINOID SYNDROME (HCC): ICD-10-CM

## 2021-01-01 DIAGNOSIS — T83.511A URINARY TRACT INFECTION ASSOCIATED WITH INDWELLING URETHRAL CATHETER, INITIAL ENCOUNTER (HCC): ICD-10-CM

## 2021-01-01 DIAGNOSIS — N17.9 ACUTE KIDNEY INJURY SUPERIMPOSED ON CHRONIC KIDNEY DISEASE (HCC): Chronic | ICD-10-CM

## 2021-01-01 DIAGNOSIS — C7A.8 NEUROENDOCRINE CARCINOMA METASTATIC TO LIVER (HCC): ICD-10-CM

## 2021-01-01 DIAGNOSIS — N18.5 ACUTE RENAL FAILURE SUPERIMPOSED ON STAGE 5 CHRONIC KIDNEY DISEASE, NOT ON CHRONIC DIALYSIS, UNSPECIFIED ACUTE RENAL FAILURE TYPE (HCC): ICD-10-CM

## 2021-01-01 DIAGNOSIS — C7A.8 NEUROENDOCRINE CARCINOMA METASTATIC TO LIVER (HCC): Chronic | ICD-10-CM

## 2021-01-01 DIAGNOSIS — C7B.8 NEUROENDOCRINE CARCINOMA METASTATIC TO LIVER (HCC): ICD-10-CM

## 2021-01-01 DIAGNOSIS — N18.5 CKD (CHRONIC KIDNEY DISEASE) STAGE 5, GFR LESS THAN 15 ML/MIN (HCC): ICD-10-CM

## 2021-01-01 DIAGNOSIS — R33.9 URINARY RETENTION: ICD-10-CM

## 2021-01-01 DIAGNOSIS — N18.5 HYPERTENSIVE KIDNEY DISEASE WITH STAGE 5 CHRONIC KIDNEY DISEASE, NOT ON CHRONIC DIALYSIS (HCC): ICD-10-CM

## 2021-01-01 DIAGNOSIS — E44.0 MODERATE PROTEIN-CALORIE MALNUTRITION (HCC): ICD-10-CM

## 2021-01-01 DIAGNOSIS — E11.65 TYPE 2 DIABETES MELLITUS WITH HYPERGLYCEMIA, WITH LONG-TERM CURRENT USE OF INSULIN (HCC): ICD-10-CM

## 2021-01-01 DIAGNOSIS — C7B.8 NEUROENDOCRINE CARCINOMA METASTATIC TO LIVER (HCC): Primary | Chronic | ICD-10-CM

## 2021-01-01 DIAGNOSIS — N17.9 AKI (ACUTE KIDNEY INJURY) (HCC): ICD-10-CM

## 2021-01-01 DIAGNOSIS — R53.83 FATIGUE: ICD-10-CM

## 2021-01-01 DIAGNOSIS — F32.A DEPRESSION, UNSPECIFIED DEPRESSION TYPE: ICD-10-CM

## 2021-01-01 DIAGNOSIS — C22.9 LIVER CANCER (HCC): ICD-10-CM

## 2021-01-01 DIAGNOSIS — J12.82 PNEUMONIA DUE TO COVID-19 VIRUS: ICD-10-CM

## 2021-01-01 DIAGNOSIS — E86.0 DEHYDRATION: ICD-10-CM

## 2021-01-01 DIAGNOSIS — U07.1 COVID-19: ICD-10-CM

## 2021-01-01 DIAGNOSIS — N17.9 AKI (ACUTE KIDNEY INJURY) (HCC): Primary | ICD-10-CM

## 2021-01-01 DIAGNOSIS — D64.9 NORMOCYTIC ANEMIA: ICD-10-CM

## 2021-01-01 DIAGNOSIS — Z71.89 COMPLEX CARE COORDINATION: Primary | ICD-10-CM

## 2021-01-01 DIAGNOSIS — E86.0 DEHYDRATION: Primary | ICD-10-CM

## 2021-01-01 DIAGNOSIS — E83.42 HYPOMAGNESEMIA: ICD-10-CM

## 2021-01-01 DIAGNOSIS — N17.9 ACUTE RENAL FAILURE SUPERIMPOSED ON STAGE 5 CHRONIC KIDNEY DISEASE, NOT ON CHRONIC DIALYSIS, UNSPECIFIED ACUTE RENAL FAILURE TYPE (HCC): ICD-10-CM

## 2021-01-01 DIAGNOSIS — U07.1 PNEUMONIA DUE TO COVID-19 VIRUS: ICD-10-CM

## 2021-01-01 DIAGNOSIS — N39.0 RECURRENT UTI: ICD-10-CM

## 2021-01-01 DIAGNOSIS — N28.9 RENAL INSUFFICIENCY: ICD-10-CM

## 2021-01-01 DIAGNOSIS — D64.9 ANEMIA, UNSPECIFIED TYPE: Primary | ICD-10-CM

## 2021-01-01 DIAGNOSIS — M1A.3620 CHRONIC GOUT OF LEFT KNEE DUE TO RENAL IMPAIRMENT WITHOUT TOPHUS: ICD-10-CM

## 2021-01-01 DIAGNOSIS — N18.5 STAGE 5 CHRONIC KIDNEY DISEASE NOT ON CHRONIC DIALYSIS (HCC): ICD-10-CM

## 2021-01-01 DIAGNOSIS — N18.9 ACUTE KIDNEY INJURY SUPERIMPOSED ON CHRONIC KIDNEY DISEASE (HCC): Chronic | ICD-10-CM

## 2021-01-01 DIAGNOSIS — C7A.8 NEUROENDOCRINE CARCINOMA METASTATIC TO LIVER (HCC): Primary | Chronic | ICD-10-CM

## 2021-01-01 DIAGNOSIS — R06.02 SHORTNESS OF BREATH: Primary | ICD-10-CM

## 2021-01-01 DIAGNOSIS — C7A.8 NEUROENDOCRINE CARCINOMA METASTATIC TO LIVER (HCC): Primary | ICD-10-CM

## 2021-01-01 DIAGNOSIS — R55 SYNCOPE AND COLLAPSE: Chronic | ICD-10-CM

## 2021-01-01 DIAGNOSIS — A04.72 CLOSTRIDIUM DIFFICILE DIARRHEA: Chronic | ICD-10-CM

## 2021-01-01 DIAGNOSIS — C79.9 METASTATIC CANCER (HCC): ICD-10-CM

## 2021-01-01 DIAGNOSIS — R55 VASOVAGAL EPISODE: ICD-10-CM

## 2021-01-01 DIAGNOSIS — N25.81 SECONDARY HYPERPARATHYROIDISM (HCC): ICD-10-CM

## 2021-01-01 DIAGNOSIS — N39.0 UTI (URINARY TRACT INFECTION): Primary | ICD-10-CM

## 2021-01-01 DIAGNOSIS — I48.91 ATRIAL FIBRILLATION (HCC): ICD-10-CM

## 2021-01-01 DIAGNOSIS — R60.9 EDEMA, UNSPECIFIED TYPE: ICD-10-CM

## 2021-01-01 DIAGNOSIS — N18.5 CKD (CHRONIC KIDNEY DISEASE) STAGE 5, GFR LESS THAN 15 ML/MIN (HCC): Primary | ICD-10-CM

## 2021-01-01 DIAGNOSIS — R19.7 DIARRHEA, UNSPECIFIED TYPE: ICD-10-CM

## 2021-01-01 DIAGNOSIS — Z96.0 URINARY CATHETER IN PLACE: Chronic | ICD-10-CM

## 2021-01-01 DIAGNOSIS — D64.9 ANEMIA, UNSPECIFIED TYPE: ICD-10-CM

## 2021-01-01 DIAGNOSIS — I25.10 CORONARY ARTERY DISEASE INVOLVING NATIVE CORONARY ARTERY OF NATIVE HEART WITHOUT ANGINA PECTORIS: ICD-10-CM

## 2021-01-01 DIAGNOSIS — N39.0 URINARY TRACT INFECTION ASSOCIATED WITH INDWELLING URETHRAL CATHETER, INITIAL ENCOUNTER (HCC): ICD-10-CM

## 2021-01-01 DIAGNOSIS — C7A.8 MALIGNANT NEUROENDOCRINE NEOPLASM (HCC): Chronic | ICD-10-CM

## 2021-01-01 DIAGNOSIS — I50.30 DIASTOLIC CONGESTIVE HEART FAILURE, UNSPECIFIED HF CHRONICITY (HCC): ICD-10-CM

## 2021-01-01 DIAGNOSIS — R07.9 CHEST PAIN: ICD-10-CM

## 2021-01-01 DIAGNOSIS — E87.2 ACIDOSIS: ICD-10-CM

## 2021-01-01 DIAGNOSIS — N18.9 CHRONIC RENAL FAILURE: ICD-10-CM

## 2021-01-01 LAB
25(OH)D3 SERPL-MCNC: 32.5 NG/ML (ref 30–100)
ABO GROUP BLD BPU: NORMAL
ABO GROUP BLD: NORMAL
ABO GROUP BLD: NORMAL
ALBUMIN SERPL BCP-MCNC: 2.3 G/DL (ref 3.5–5)
ALBUMIN SERPL BCP-MCNC: 2.6 G/DL (ref 3.5–5)
ALBUMIN SERPL BCP-MCNC: 3 G/DL (ref 3.5–5)
ALBUMIN SERPL BCP-MCNC: 3.1 G/DL (ref 3.5–5.7)
ALBUMIN SERPL BCP-MCNC: 3.1 G/DL (ref 3.5–5.7)
ALBUMIN SERPL BCP-MCNC: 3.2 G/DL (ref 3.5–5.7)
ALBUMIN SERPL BCP-MCNC: 3.2 G/DL (ref 3.5–5.7)
ALBUMIN SERPL BCP-MCNC: 3.3 G/DL (ref 3.5–5)
ALBUMIN SERPL BCP-MCNC: 3.3 G/DL (ref 3.5–5.7)
ALBUMIN SERPL BCP-MCNC: 3.4 G/DL (ref 3.5–5.7)
ALBUMIN SERPL BCP-MCNC: 3.4 G/DL (ref 3.5–5.7)
ALBUMIN SERPL BCP-MCNC: 3.6 G/DL (ref 3.5–5.7)
ALBUMIN SERPL BCP-MCNC: 3.8 G/DL (ref 3.5–5.7)
ALP SERPL-CCNC: 127 U/L (ref 46–116)
ALP SERPL-CCNC: 154 U/L (ref 46–116)
ALP SERPL-CCNC: 172 U/L (ref 55–165)
ALP SERPL-CCNC: 197 U/L (ref 46–116)
ALP SERPL-CCNC: 213 U/L (ref 55–165)
ALP SERPL-CCNC: 403 U/L (ref 46–116)
ALP SERPL-CCNC: 66 U/L (ref 55–165)
ALP SERPL-CCNC: 69 U/L (ref 55–165)
ALP SERPL-CCNC: 74 U/L (ref 55–165)
ALP SERPL-CCNC: 76 U/L (ref 55–165)
ALP SERPL-CCNC: 77 U/L (ref 55–165)
ALP SERPL-CCNC: 77 U/L (ref 55–165)
ALP SERPL-CCNC: 79 U/L (ref 55–165)
ALT SERPL W P-5'-P-CCNC: 19 U/L (ref 12–78)
ALT SERPL W P-5'-P-CCNC: 27 U/L (ref 12–78)
ALT SERPL W P-5'-P-CCNC: 27 U/L (ref 12–78)
ALT SERPL W P-5'-P-CCNC: 36 U/L (ref 7–52)
ALT SERPL W P-5'-P-CCNC: 53 U/L (ref 7–52)
ALT SERPL W P-5'-P-CCNC: 7 U/L (ref 7–52)
ALT SERPL W P-5'-P-CCNC: 77 U/L (ref 12–78)
ALT SERPL W P-5'-P-CCNC: 8 U/L (ref 7–52)
ALT SERPL W P-5'-P-CCNC: 8 U/L (ref 7–52)
ALT SERPL W P-5'-P-CCNC: 9 U/L (ref 7–52)
ANION GAP SERPL CALCULATED.3IONS-SCNC: 10 MMOL/L (ref 4–13)
ANION GAP SERPL CALCULATED.3IONS-SCNC: 10 MMOL/L (ref 4–13)
ANION GAP SERPL CALCULATED.3IONS-SCNC: 11 MMOL/L (ref 4–13)
ANION GAP SERPL CALCULATED.3IONS-SCNC: 12 MMOL/L (ref 4–13)
ANION GAP SERPL CALCULATED.3IONS-SCNC: 12 MMOL/L (ref 4–13)
ANION GAP SERPL CALCULATED.3IONS-SCNC: 13 MMOL/L (ref 4–13)
ANION GAP SERPL CALCULATED.3IONS-SCNC: 14 MMOL/L (ref 4–13)
ANION GAP SERPL CALCULATED.3IONS-SCNC: 16 MMOL/L (ref 4–13)
ANION GAP SERPL CALCULATED.3IONS-SCNC: 18 MMOL/L (ref 4–13)
ANION GAP SERPL CALCULATED.3IONS-SCNC: 20 MMOL/L (ref 4–13)
ANION GAP SERPL CALCULATED.3IONS-SCNC: 8 MMOL/L (ref 4–13)
ANION GAP SERPL CALCULATED.3IONS-SCNC: 9 MMOL/L (ref 4–13)
APTT PPP: 115 SECONDS (ref 23–37)
APTT PPP: 28 SECONDS (ref 23–37)
APTT PPP: 28 SECONDS (ref 23–37)
APTT PPP: 30 SECONDS (ref 23–37)
APTT PPP: 39 SECONDS (ref 23–37)
APTT PPP: 78 SECONDS (ref 23–37)
AST SERPL W P-5'-P-CCNC: 11 U/L (ref 5–45)
AST SERPL W P-5'-P-CCNC: 14 U/L (ref 13–39)
AST SERPL W P-5'-P-CCNC: 15 U/L (ref 13–39)
AST SERPL W P-5'-P-CCNC: 16 U/L (ref 13–39)
AST SERPL W P-5'-P-CCNC: 16 U/L (ref 13–39)
AST SERPL W P-5'-P-CCNC: 17 U/L (ref 5–45)
AST SERPL W P-5'-P-CCNC: 20 U/L (ref 13–39)
AST SERPL W P-5'-P-CCNC: 26 U/L (ref 5–45)
AST SERPL W P-5'-P-CCNC: 34 U/L (ref 13–39)
AST SERPL W P-5'-P-CCNC: 40 U/L (ref 5–45)
AST SERPL W P-5'-P-CCNC: 74 U/L (ref 13–39)
ATRIAL RATE: 68 BPM
ATRIAL RATE: 74 BPM
ATRIAL RATE: 75 BPM
ATRIAL RATE: 76 BPM
ATRIAL RATE: 77 BPM
ATRIAL RATE: 77 BPM
ATRIAL RATE: 78 BPM
ATRIAL RATE: 82 BPM
ATRIAL RATE: 86 BPM
BACTERIA BLD CULT: NORMAL
BACTERIA UR CULT: ABNORMAL
BACTERIA UR CULT: NORMAL
BACTERIA UR CULT: NORMAL
BACTERIA UR QL AUTO: ABNORMAL /HPF
BASOPHILS # BLD AUTO: 0 THOUSANDS/ΜL (ref 0–0.1)
BASOPHILS # BLD AUTO: 0.07 THOUSANDS/ΜL (ref 0–0.1)
BASOPHILS # BLD AUTO: 0.08 THOUSANDS/ΜL (ref 0–0.1)
BASOPHILS # BLD AUTO: 0.09 THOUSANDS/ΜL (ref 0–0.1)
BASOPHILS # BLD AUTO: 0.1 THOUSANDS/ΜL (ref 0–0.1)
BASOPHILS # BLD AUTO: 0.11 THOUSANDS/ΜL (ref 0–0.1)
BASOPHILS # BLD AUTO: 0.13 THOUSANDS/ΜL (ref 0–0.1)
BASOPHILS NFR BLD AUTO: 0 % (ref 0–2)
BASOPHILS NFR BLD AUTO: 1 % (ref 0–1)
BASOPHILS NFR BLD AUTO: 1 % (ref 0–2)
BASOPHILS NFR BLD AUTO: 1 % (ref 0–2)
BILIRUB SERPL-MCNC: 0.3 MG/DL (ref 0.2–1)
BILIRUB SERPL-MCNC: 0.4 MG/DL (ref 0.2–1)
BILIRUB SERPL-MCNC: 0.5 MG/DL (ref 0.2–1)
BILIRUB SERPL-MCNC: 0.51 MG/DL (ref 0.2–1)
BILIRUB SERPL-MCNC: 0.6 MG/DL (ref 0.2–1)
BILIRUB SERPL-MCNC: 0.67 MG/DL (ref 0.2–1)
BILIRUB SERPL-MCNC: 0.69 MG/DL (ref 0.2–1)
BILIRUB UR QL STRIP: NEGATIVE
BLD GP AB SCN SERPL QL: NEGATIVE
BLD GP AB SCN SERPL QL: NEGATIVE
BPU ID: NORMAL
BUN SERPL-MCNC: 41 MG/DL (ref 5–25)
BUN SERPL-MCNC: 43 MG/DL (ref 5–25)
BUN SERPL-MCNC: 44 MG/DL (ref 5–25)
BUN SERPL-MCNC: 45 MG/DL (ref 5–25)
BUN SERPL-MCNC: 47 MG/DL (ref 5–25)
BUN SERPL-MCNC: 48 MG/DL (ref 5–25)
BUN SERPL-MCNC: 49 MG/DL (ref 5–25)
BUN SERPL-MCNC: 49 MG/DL (ref 7–25)
BUN SERPL-MCNC: 51 MG/DL (ref 7–25)
BUN SERPL-MCNC: 52 MG/DL (ref 7–25)
BUN SERPL-MCNC: 53 MG/DL (ref 7–25)
BUN SERPL-MCNC: 54 MG/DL (ref 5–25)
BUN SERPL-MCNC: 54 MG/DL (ref 7–25)
BUN SERPL-MCNC: 54 MG/DL (ref 7–25)
BUN SERPL-MCNC: 55 MG/DL (ref 7–25)
C DIFF TOX B TCDB STL QL NAA+PROBE: NEGATIVE
CALCIUM ALBUM COR SERPL-MCNC: 8.4 MG/DL (ref 8.3–10.1)
CALCIUM ALBUM COR SERPL-MCNC: 8.6 MG/DL (ref 8.3–10.1)
CALCIUM ALBUM COR SERPL-MCNC: 8.6 MG/DL (ref 8.3–10.1)
CALCIUM ALBUM COR SERPL-MCNC: 8.8 MG/DL (ref 8.3–10.1)
CALCIUM ALBUM COR SERPL-MCNC: 8.9 MG/DL (ref 8.3–10.1)
CALCIUM ALBUM COR SERPL-MCNC: 8.9 MG/DL (ref 8.3–10.1)
CALCIUM ALBUM COR SERPL-MCNC: 9 MG/DL (ref 8.3–10.1)
CALCIUM ALBUM COR SERPL-MCNC: 9.4 MG/DL (ref 8.3–10.1)
CALCIUM ALBUM COR SERPL-MCNC: 9.5 MG/DL (ref 8.3–10.1)
CALCIUM ALBUM COR SERPL-MCNC: 9.6 MG/DL (ref 8.3–10.1)
CALCIUM ALBUM COR SERPL-MCNC: 9.6 MG/DL (ref 8.3–10.1)
CALCIUM SERPL-MCNC: 7.7 MG/DL (ref 8.3–10.1)
CALCIUM SERPL-MCNC: 7.8 MG/DL (ref 8.6–10.5)
CALCIUM SERPL-MCNC: 7.8 MG/DL (ref 8.6–10.5)
CALCIUM SERPL-MCNC: 7.9 MG/DL (ref 8.3–10.1)
CALCIUM SERPL-MCNC: 7.9 MG/DL (ref 8.6–10.5)
CALCIUM SERPL-MCNC: 8 MG/DL (ref 8.6–10.5)
CALCIUM SERPL-MCNC: 8.1 MG/DL (ref 8.3–10.1)
CALCIUM SERPL-MCNC: 8.2 MG/DL (ref 8.3–10.1)
CALCIUM SERPL-MCNC: 8.2 MG/DL (ref 8.3–10.1)
CALCIUM SERPL-MCNC: 8.2 MG/DL (ref 8.6–10.5)
CALCIUM SERPL-MCNC: 8.2 MG/DL (ref 8.6–10.5)
CALCIUM SERPL-MCNC: 8.3 MG/DL (ref 8.6–10.5)
CALCIUM SERPL-MCNC: 8.4 MG/DL (ref 8.3–10.1)
CALCIUM SERPL-MCNC: 8.4 MG/DL (ref 8.6–10.5)
CALCIUM SERPL-MCNC: 8.5 MG/DL (ref 8.6–10.5)
CALCIUM SERPL-MCNC: 8.8 MG/DL (ref 8.3–10.1)
CALCIUM SERPL-MCNC: 8.8 MG/DL (ref 8.3–10.1)
CALCIUM SERPL-MCNC: 8.8 MG/DL (ref 8.6–10.5)
CHLORIDE SERPL-SCNC: 100 MMOL/L (ref 98–107)
CHLORIDE SERPL-SCNC: 102 MMOL/L (ref 100–108)
CHLORIDE SERPL-SCNC: 102 MMOL/L (ref 98–107)
CHLORIDE SERPL-SCNC: 105 MMOL/L (ref 100–108)
CHLORIDE SERPL-SCNC: 105 MMOL/L (ref 100–108)
CHLORIDE SERPL-SCNC: 105 MMOL/L (ref 98–107)
CHLORIDE SERPL-SCNC: 106 MMOL/L (ref 98–107)
CHLORIDE SERPL-SCNC: 107 MMOL/L (ref 100–108)
CHLORIDE SERPL-SCNC: 107 MMOL/L (ref 98–107)
CHLORIDE SERPL-SCNC: 107 MMOL/L (ref 98–107)
CHLORIDE SERPL-SCNC: 108 MMOL/L (ref 100–108)
CHLORIDE SERPL-SCNC: 110 MMOL/L (ref 100–108)
CHLORIDE SERPL-SCNC: 112 MMOL/L (ref 100–108)
CHLORIDE SERPL-SCNC: 112 MMOL/L (ref 98–107)
CHLORIDE SERPL-SCNC: 113 MMOL/L (ref 98–107)
CHLORIDE SERPL-SCNC: 97 MMOL/L (ref 100–108)
CK SERPL-CCNC: 35 U/L (ref 39–308)
CK SERPL-CCNC: 50 U/L (ref 39–308)
CLARITY UR: ABNORMAL
CO2 SERPL-SCNC: 17 MMOL/L (ref 21–31)
CO2 SERPL-SCNC: 19 MMOL/L (ref 21–31)
CO2 SERPL-SCNC: 20 MMOL/L (ref 21–31)
CO2 SERPL-SCNC: 21 MMOL/L (ref 21–31)
CO2 SERPL-SCNC: 21 MMOL/L (ref 21–32)
CO2 SERPL-SCNC: 22 MMOL/L (ref 21–31)
CO2 SERPL-SCNC: 22 MMOL/L (ref 21–32)
CO2 SERPL-SCNC: 23 MMOL/L (ref 21–31)
CO2 SERPL-SCNC: 23 MMOL/L (ref 21–32)
CO2 SERPL-SCNC: 25 MMOL/L (ref 21–31)
CO2 SERPL-SCNC: 25 MMOL/L (ref 21–32)
CO2 SERPL-SCNC: 25 MMOL/L (ref 21–32)
CO2 SERPL-SCNC: 26 MMOL/L (ref 21–31)
COLOR UR: YELLOW
CREAT SERPL-MCNC: 4.73 MG/DL (ref 0.6–1.3)
CREAT SERPL-MCNC: 4.74 MG/DL (ref 0.6–1.3)
CREAT SERPL-MCNC: 4.88 MG/DL (ref 0.6–1.3)
CREAT SERPL-MCNC: 4.92 MG/DL (ref 0.6–1.3)
CREAT SERPL-MCNC: 4.93 MG/DL (ref 0.7–1.3)
CREAT SERPL-MCNC: 4.98 MG/DL (ref 0.6–1.3)
CREAT SERPL-MCNC: 4.98 MG/DL (ref 0.7–1.3)
CREAT SERPL-MCNC: 5 MG/DL (ref 0.6–1.3)
CREAT SERPL-MCNC: 5.04 MG/DL (ref 0.6–1.3)
CREAT SERPL-MCNC: 5.08 MG/DL (ref 0.7–1.3)
CREAT SERPL-MCNC: 5.09 MG/DL (ref 0.7–1.3)
CREAT SERPL-MCNC: 5.3 MG/DL (ref 0.6–1.3)
CREAT SERPL-MCNC: 5.45 MG/DL (ref 0.7–1.3)
CREAT SERPL-MCNC: 5.47 MG/DL (ref 0.7–1.3)
CREAT SERPL-MCNC: 5.49 MG/DL (ref 0.7–1.3)
CREAT SERPL-MCNC: 5.54 MG/DL (ref 0.6–1.3)
CREAT SERPL-MCNC: 5.54 MG/DL (ref 0.7–1.3)
CREAT SERPL-MCNC: 5.56 MG/DL (ref 0.7–1.3)
CREAT SERPL-MCNC: 5.65 MG/DL (ref 0.7–1.3)
CREAT SERPL-MCNC: 5.84 MG/DL (ref 0.7–1.3)
CREAT SERPL-MCNC: 6.08 MG/DL (ref 0.6–1.3)
CREAT SERPL-MCNC: 6.27 MG/DL (ref 0.7–1.3)
CREAT SERPL-MCNC: 6.34 MG/DL (ref 0.7–1.3)
CREAT UR-MCNC: 49.6 MG/DL
CREAT UR-MCNC: 68.6 MG/DL
CREAT UR-MCNC: 94.8 MG/DL
CROSSMATCH: NORMAL
CRP SERPL QL: 111.8 MG/L
CRP SERPL QL: 114.9 MG/L
CRP SERPL QL: 122.3 MG/L
D DIMER PPP FEU-MCNC: 2.71 UG/ML FEU
D DIMER PPP FEU-MCNC: 3.42 UG/ML FEU
D DIMER PPP FEU-MCNC: 3.55 UG/ML FEU
D DIMER PPP FEU-MCNC: 3.85 UG/ML FEU
D DIMER PPP FEU-MCNC: 4.51 UG/ML FEU
EOSINOPHIL # BLD AUTO: 0 THOUSAND/ΜL (ref 0–0.61)
EOSINOPHIL # BLD AUTO: 0.07 THOUSAND/ΜL (ref 0–0.61)
EOSINOPHIL # BLD AUTO: 0.08 THOUSAND/ΜL (ref 0–0.61)
EOSINOPHIL # BLD AUTO: 0.1 THOUSAND/ΜL (ref 0–0.61)
EOSINOPHIL # BLD AUTO: 0.1 THOUSAND/ΜL (ref 0–0.61)
EOSINOPHIL # BLD AUTO: 0.11 THOUSAND/ΜL (ref 0–0.61)
EOSINOPHIL # BLD AUTO: 0.11 THOUSAND/ΜL (ref 0–0.61)
EOSINOPHIL # BLD AUTO: 0.14 THOUSAND/ΜL (ref 0–0.61)
EOSINOPHIL # BLD AUTO: 0.15 THOUSAND/ΜL (ref 0–0.61)
EOSINOPHIL # BLD AUTO: 0.15 THOUSAND/ΜL (ref 0–0.61)
EOSINOPHIL # BLD AUTO: 0.2 THOUSAND/ΜL (ref 0–0.61)
EOSINOPHIL # BLD AUTO: 0.22 THOUSAND/ΜL (ref 0–0.61)
EOSINOPHIL NFR BLD AUTO: 0 % (ref 0–5)
EOSINOPHIL NFR BLD AUTO: 1 % (ref 0–5)
EOSINOPHIL NFR BLD AUTO: 1 % (ref 0–5)
EOSINOPHIL NFR BLD AUTO: 1 % (ref 0–6)
EOSINOPHIL NFR BLD AUTO: 2 % (ref 0–5)
EOSINOPHIL NFR BLD AUTO: 2 % (ref 0–6)
EOSINOPHIL NFR BLD AUTO: 3 % (ref 0–6)
ERYTHROCYTE [DISTWIDTH] IN BLOOD BY AUTOMATED COUNT: 13.8 % (ref 11.5–14.5)
ERYTHROCYTE [DISTWIDTH] IN BLOOD BY AUTOMATED COUNT: 13.8 % (ref 11.5–14.5)
ERYTHROCYTE [DISTWIDTH] IN BLOOD BY AUTOMATED COUNT: 13.9 % (ref 11.5–14.5)
ERYTHROCYTE [DISTWIDTH] IN BLOOD BY AUTOMATED COUNT: 14.1 % (ref 11.5–14.5)
ERYTHROCYTE [DISTWIDTH] IN BLOOD BY AUTOMATED COUNT: 14.3 % (ref 11.5–14.5)
ERYTHROCYTE [DISTWIDTH] IN BLOOD BY AUTOMATED COUNT: 14.3 % (ref 11.6–15.1)
ERYTHROCYTE [DISTWIDTH] IN BLOOD BY AUTOMATED COUNT: 14.4 % (ref 11.6–15.1)
ERYTHROCYTE [DISTWIDTH] IN BLOOD BY AUTOMATED COUNT: 14.5 % (ref 11.6–15.1)
ERYTHROCYTE [DISTWIDTH] IN BLOOD BY AUTOMATED COUNT: 14.6 % (ref 11.6–15.1)
ERYTHROCYTE [DISTWIDTH] IN BLOOD BY AUTOMATED COUNT: 14.6 % (ref 11.6–15.1)
ERYTHROCYTE [DISTWIDTH] IN BLOOD BY AUTOMATED COUNT: 14.7 % (ref 11.6–15.1)
ERYTHROCYTE [DISTWIDTH] IN BLOOD BY AUTOMATED COUNT: 14.8 % (ref 11.6–15.1)
ERYTHROCYTE [DISTWIDTH] IN BLOOD BY AUTOMATED COUNT: 15 % (ref 11.6–15.1)
ERYTHROCYTE [DISTWIDTH] IN BLOOD BY AUTOMATED COUNT: 15.3 % (ref 11.6–15.1)
ERYTHROCYTE [DISTWIDTH] IN BLOOD BY AUTOMATED COUNT: 15.6 % (ref 11.6–15.1)
ERYTHROCYTE [DISTWIDTH] IN BLOOD BY AUTOMATED COUNT: 15.8 % (ref 11.5–14.5)
ERYTHROCYTE [DISTWIDTH] IN BLOOD BY AUTOMATED COUNT: 15.9 % (ref 11.5–14.5)
FERRITIN SERPL-MCNC: 1055 NG/ML (ref 8–388)
FERRITIN SERPL-MCNC: 507 NG/ML (ref 8–388)
FERRITIN SERPL-MCNC: 690 NG/ML (ref 8–388)
FERRITIN SERPL-MCNC: 931 NG/ML (ref 8–388)
FERRITIN SERPL-MCNC: 951 NG/ML (ref 8–388)
FLUAV RNA RESP QL NAA+PROBE: NEGATIVE
FLUBV RNA RESP QL NAA+PROBE: NEGATIVE
GFR SERPL CREATININE-BSD FRML MDRD: 10 ML/MIN/1.73SQ M
GFR SERPL CREATININE-BSD FRML MDRD: 11 ML/MIN/1.73SQ M
GFR SERPL CREATININE-BSD FRML MDRD: 11 ML/MIN/1.73SQ M
GFR SERPL CREATININE-BSD FRML MDRD: 8 ML/MIN/1.73SQ M
GFR SERPL CREATININE-BSD FRML MDRD: 9 ML/MIN/1.73SQ M
GLUCOSE P FAST SERPL-MCNC: 84 MG/DL (ref 65–99)
GLUCOSE SERPL-MCNC: 106 MG/DL (ref 65–99)
GLUCOSE SERPL-MCNC: 108 MG/DL (ref 65–140)
GLUCOSE SERPL-MCNC: 109 MG/DL (ref 65–140)
GLUCOSE SERPL-MCNC: 112 MG/DL (ref 65–140)
GLUCOSE SERPL-MCNC: 120 MG/DL (ref 65–99)
GLUCOSE SERPL-MCNC: 121 MG/DL (ref 65–140)
GLUCOSE SERPL-MCNC: 121 MG/DL (ref 65–140)
GLUCOSE SERPL-MCNC: 121 MG/DL (ref 65–99)
GLUCOSE SERPL-MCNC: 123 MG/DL (ref 65–140)
GLUCOSE SERPL-MCNC: 124 MG/DL (ref 65–140)
GLUCOSE SERPL-MCNC: 128 MG/DL (ref 65–140)
GLUCOSE SERPL-MCNC: 128 MG/DL (ref 65–140)
GLUCOSE SERPL-MCNC: 129 MG/DL (ref 65–140)
GLUCOSE SERPL-MCNC: 130 MG/DL (ref 65–140)
GLUCOSE SERPL-MCNC: 132 MG/DL (ref 65–99)
GLUCOSE SERPL-MCNC: 136 MG/DL (ref 65–140)
GLUCOSE SERPL-MCNC: 138 MG/DL (ref 65–140)
GLUCOSE SERPL-MCNC: 140 MG/DL (ref 65–99)
GLUCOSE SERPL-MCNC: 141 MG/DL (ref 65–140)
GLUCOSE SERPL-MCNC: 142 MG/DL (ref 65–99)
GLUCOSE SERPL-MCNC: 144 MG/DL (ref 65–140)
GLUCOSE SERPL-MCNC: 144 MG/DL (ref 65–140)
GLUCOSE SERPL-MCNC: 145 MG/DL (ref 65–99)
GLUCOSE SERPL-MCNC: 149 MG/DL (ref 65–140)
GLUCOSE SERPL-MCNC: 153 MG/DL (ref 65–140)
GLUCOSE SERPL-MCNC: 156 MG/DL (ref 65–140)
GLUCOSE SERPL-MCNC: 157 MG/DL (ref 65–140)
GLUCOSE SERPL-MCNC: 160 MG/DL (ref 65–140)
GLUCOSE SERPL-MCNC: 160 MG/DL (ref 65–140)
GLUCOSE SERPL-MCNC: 162 MG/DL (ref 65–140)
GLUCOSE SERPL-MCNC: 162 MG/DL (ref 65–140)
GLUCOSE SERPL-MCNC: 163 MG/DL (ref 65–140)
GLUCOSE SERPL-MCNC: 163 MG/DL (ref 65–99)
GLUCOSE SERPL-MCNC: 169 MG/DL (ref 65–140)
GLUCOSE SERPL-MCNC: 170 MG/DL (ref 65–140)
GLUCOSE SERPL-MCNC: 171 MG/DL (ref 65–140)
GLUCOSE SERPL-MCNC: 174 MG/DL (ref 65–140)
GLUCOSE SERPL-MCNC: 176 MG/DL (ref 65–140)
GLUCOSE SERPL-MCNC: 178 MG/DL (ref 65–140)
GLUCOSE SERPL-MCNC: 178 MG/DL (ref 65–99)
GLUCOSE SERPL-MCNC: 180 MG/DL (ref 65–140)
GLUCOSE SERPL-MCNC: 183 MG/DL (ref 65–140)
GLUCOSE SERPL-MCNC: 185 MG/DL (ref 65–140)
GLUCOSE SERPL-MCNC: 190 MG/DL (ref 65–140)
GLUCOSE SERPL-MCNC: 192 MG/DL (ref 65–140)
GLUCOSE SERPL-MCNC: 193 MG/DL (ref 65–140)
GLUCOSE SERPL-MCNC: 193 MG/DL (ref 65–140)
GLUCOSE SERPL-MCNC: 195 MG/DL (ref 65–99)
GLUCOSE SERPL-MCNC: 197 MG/DL (ref 65–140)
GLUCOSE SERPL-MCNC: 202 MG/DL (ref 65–140)
GLUCOSE SERPL-MCNC: 203 MG/DL (ref 65–140)
GLUCOSE SERPL-MCNC: 207 MG/DL (ref 65–140)
GLUCOSE SERPL-MCNC: 208 MG/DL (ref 65–140)
GLUCOSE SERPL-MCNC: 209 MG/DL (ref 65–140)
GLUCOSE SERPL-MCNC: 210 MG/DL (ref 65–140)
GLUCOSE SERPL-MCNC: 210 MG/DL (ref 65–140)
GLUCOSE SERPL-MCNC: 211 MG/DL (ref 65–140)
GLUCOSE SERPL-MCNC: 212 MG/DL (ref 65–140)
GLUCOSE SERPL-MCNC: 215 MG/DL (ref 65–140)
GLUCOSE SERPL-MCNC: 215 MG/DL (ref 65–140)
GLUCOSE SERPL-MCNC: 218 MG/DL (ref 65–140)
GLUCOSE SERPL-MCNC: 230 MG/DL (ref 65–140)
GLUCOSE SERPL-MCNC: 238 MG/DL (ref 65–140)
GLUCOSE SERPL-MCNC: 239 MG/DL (ref 65–99)
GLUCOSE SERPL-MCNC: 241 MG/DL (ref 65–140)
GLUCOSE SERPL-MCNC: 242 MG/DL (ref 65–140)
GLUCOSE SERPL-MCNC: 246 MG/DL (ref 65–140)
GLUCOSE SERPL-MCNC: 249 MG/DL (ref 65–140)
GLUCOSE SERPL-MCNC: 258 MG/DL (ref 65–140)
GLUCOSE SERPL-MCNC: 279 MG/DL (ref 65–140)
GLUCOSE SERPL-MCNC: 282 MG/DL (ref 65–140)
GLUCOSE SERPL-MCNC: 292 MG/DL (ref 65–140)
GLUCOSE SERPL-MCNC: 292 MG/DL (ref 65–140)
GLUCOSE SERPL-MCNC: 294 MG/DL (ref 65–140)
GLUCOSE SERPL-MCNC: 317 MG/DL (ref 65–140)
GLUCOSE SERPL-MCNC: 340 MG/DL (ref 65–140)
GLUCOSE SERPL-MCNC: 443 MG/DL (ref 65–140)
GLUCOSE SERPL-MCNC: 46 MG/DL (ref 65–99)
GLUCOSE SERPL-MCNC: 462 MG/DL (ref 65–140)
GLUCOSE SERPL-MCNC: 55 MG/DL (ref 65–140)
GLUCOSE SERPL-MCNC: 56 MG/DL (ref 65–140)
GLUCOSE SERPL-MCNC: 57 MG/DL (ref 65–140)
GLUCOSE SERPL-MCNC: 85 MG/DL (ref 65–140)
GLUCOSE SERPL-MCNC: 89 MG/DL (ref 65–140)
GLUCOSE SERPL-MCNC: 96 MG/DL (ref 65–99)
GLUCOSE UR STRIP-MCNC: ABNORMAL MG/DL
GLUCOSE UR STRIP-MCNC: NEGATIVE MG/DL
HCT VFR BLD AUTO: 22.8 % (ref 36.5–49.3)
HCT VFR BLD AUTO: 23.8 % (ref 36.5–49.3)
HCT VFR BLD AUTO: 23.9 % (ref 42–47)
HCT VFR BLD AUTO: 24.1 % (ref 36.5–49.3)
HCT VFR BLD AUTO: 24.2 % (ref 42–47)
HCT VFR BLD AUTO: 24.4 % (ref 36.5–49.3)
HCT VFR BLD AUTO: 26.2 % (ref 42–47)
HCT VFR BLD AUTO: 26.3 % (ref 42–47)
HCT VFR BLD AUTO: 26.4 % (ref 36.5–49.3)
HCT VFR BLD AUTO: 28.1 % (ref 42–47)
HCT VFR BLD AUTO: 28.2 % (ref 36.5–49.3)
HCT VFR BLD AUTO: 28.3 % (ref 36.5–49.3)
HCT VFR BLD AUTO: 28.4 % (ref 42–47)
HCT VFR BLD AUTO: 28.4 % (ref 42–47)
HCT VFR BLD AUTO: 29.3 % (ref 42–47)
HCT VFR BLD AUTO: 29.4 % (ref 42–47)
HCT VFR BLD AUTO: 29.9 % (ref 42–47)
HCT VFR BLD AUTO: 30.6 % (ref 36.5–49.3)
HCT VFR BLD AUTO: 31.9 % (ref 36.5–49.3)
HCT VFR BLD AUTO: 34.7 % (ref 36.5–49.3)
HCT VFR BLD AUTO: 35 % (ref 42–47)
HEMOCCULT STL QL: NEGATIVE
HGB BLD-MCNC: 10 G/DL (ref 14–18)
HGB BLD-MCNC: 10.1 G/DL (ref 12–17)
HGB BLD-MCNC: 10.4 G/DL (ref 12–17)
HGB BLD-MCNC: 11.4 G/DL (ref 14–18)
HGB BLD-MCNC: 11.5 G/DL (ref 12–17)
HGB BLD-MCNC: 7.4 G/DL (ref 12–17)
HGB BLD-MCNC: 7.5 G/DL (ref 12–17)
HGB BLD-MCNC: 7.8 G/DL (ref 12–17)
HGB BLD-MCNC: 7.9 G/DL (ref 12–17)
HGB BLD-MCNC: 8 G/DL (ref 14–18)
HGB BLD-MCNC: 8.1 G/DL (ref 14–18)
HGB BLD-MCNC: 8.6 G/DL (ref 14–18)
HGB BLD-MCNC: 8.7 G/DL (ref 12–17)
HGB BLD-MCNC: 8.9 G/DL (ref 14–18)
HGB BLD-MCNC: 9.1 G/DL (ref 12–17)
HGB BLD-MCNC: 9.2 G/DL (ref 14–18)
HGB BLD-MCNC: 9.3 G/DL (ref 14–18)
HGB BLD-MCNC: 9.4 G/DL (ref 12–17)
HGB BLD-MCNC: 9.5 G/DL (ref 14–18)
HGB BLD-MCNC: 9.7 G/DL (ref 14–18)
HGB BLD-MCNC: 9.7 G/DL (ref 14–18)
HGB UR QL STRIP.AUTO: ABNORMAL
IMM GRANULOCYTES # BLD AUTO: 0.02 THOUSAND/UL (ref 0–0.2)
IMM GRANULOCYTES # BLD AUTO: 0.03 THOUSAND/UL (ref 0–0.2)
IMM GRANULOCYTES # BLD AUTO: 0.04 THOUSAND/UL (ref 0–0.2)
IMM GRANULOCYTES # BLD AUTO: 0.06 THOUSAND/UL (ref 0–0.2)
IMM GRANULOCYTES # BLD AUTO: 0.1 THOUSAND/UL (ref 0–0.2)
IMM GRANULOCYTES NFR BLD AUTO: 0 % (ref 0–2)
IMM GRANULOCYTES NFR BLD AUTO: 1 % (ref 0–2)
IMM GRANULOCYTES NFR BLD AUTO: 1 % (ref 0–2)
INR PPP: 0.95 (ref 0.84–1.19)
INR PPP: 1.05 (ref 0.84–1.19)
INR PPP: 1.05 (ref 0.84–1.19)
INR PPP: 1.08 (ref 0.84–1.19)
IRON SATN MFR SERPL: 30 %
IRON SATN MFR SERPL: 48 %
IRON SERPL-MCNC: 75 UG/DL (ref 65–175)
IRON SERPL-MCNC: 88 UG/DL (ref 65–175)
KETONES UR STRIP-MCNC: NEGATIVE MG/DL
LACTATE SERPL-SCNC: 0.3 MMOL/L (ref 0.5–2)
LACTATE SERPL-SCNC: 0.8 MMOL/L (ref 0.5–2)
LACTATE SERPL-SCNC: 1.1 MMOL/L (ref 0.5–2)
LACTATE SERPL-SCNC: 1.3 MMOL/L (ref 0.5–2)
LACTATE SERPL-SCNC: 2 MMOL/L (ref 0.5–2)
LACTATE SERPL-SCNC: 2.2 MMOL/L (ref 0.5–2)
LACTATE SERPL-SCNC: 3 MMOL/L (ref 0.5–2)
LACTATE SERPL-SCNC: 3.9 MMOL/L (ref 0.5–2)
LEUKOCYTE ESTERASE UR QL STRIP: ABNORMAL
LIPASE SERPL-CCNC: 104 U/L (ref 11–82)
LYMPHOCYTES # BLD AUTO: 0.7 THOUSANDS/ΜL (ref 0.6–4.47)
LYMPHOCYTES # BLD AUTO: 1 THOUSANDS/ΜL (ref 0.6–4.47)
LYMPHOCYTES # BLD AUTO: 1.19 THOUSANDS/ΜL (ref 0.6–4.47)
LYMPHOCYTES # BLD AUTO: 1.21 THOUSANDS/ΜL (ref 0.6–4.47)
LYMPHOCYTES # BLD AUTO: 1.28 THOUSANDS/ΜL (ref 0.6–4.47)
LYMPHOCYTES # BLD AUTO: 1.3 THOUSANDS/ΜL (ref 0.6–4.47)
LYMPHOCYTES # BLD AUTO: 1.4 THOUSANDS/ΜL (ref 0.6–4.47)
LYMPHOCYTES # BLD AUTO: 1.45 THOUSANDS/ΜL (ref 0.6–4.47)
LYMPHOCYTES # BLD AUTO: 1.52 THOUSANDS/ΜL (ref 0.6–4.47)
LYMPHOCYTES # BLD AUTO: 1.6 THOUSANDS/ΜL (ref 0.6–4.47)
LYMPHOCYTES # BLD AUTO: 1.65 THOUSANDS/ΜL (ref 0.6–4.47)
LYMPHOCYTES # BLD AUTO: 1.71 THOUSANDS/ΜL (ref 0.6–4.47)
LYMPHOCYTES # BLD AUTO: 1.81 THOUSANDS/ΜL (ref 0.6–4.47)
LYMPHOCYTES # BLD AUTO: 1.9 THOUSANDS/ΜL (ref 0.6–4.47)
LYMPHOCYTES # BLD AUTO: 2.01 THOUSANDS/ΜL (ref 0.6–4.47)
LYMPHOCYTES # BLD AUTO: 2.2 THOUSANDS/ΜL (ref 0.6–4.47)
LYMPHOCYTES NFR BLD AUTO: 10 % (ref 14–44)
LYMPHOCYTES NFR BLD AUTO: 15 % (ref 14–44)
LYMPHOCYTES NFR BLD AUTO: 15 % (ref 14–44)
LYMPHOCYTES NFR BLD AUTO: 16 % (ref 21–51)
LYMPHOCYTES NFR BLD AUTO: 17 % (ref 14–44)
LYMPHOCYTES NFR BLD AUTO: 17 % (ref 14–44)
LYMPHOCYTES NFR BLD AUTO: 17 % (ref 21–51)
LYMPHOCYTES NFR BLD AUTO: 17 % (ref 21–51)
LYMPHOCYTES NFR BLD AUTO: 18 % (ref 14–44)
LYMPHOCYTES NFR BLD AUTO: 18 % (ref 21–51)
LYMPHOCYTES NFR BLD AUTO: 20 % (ref 21–51)
LYMPHOCYTES NFR BLD AUTO: 21 % (ref 14–44)
LYMPHOCYTES NFR BLD AUTO: 21 % (ref 14–44)
LYMPHOCYTES NFR BLD AUTO: 22 % (ref 14–44)
LYMPHOCYTES NFR BLD AUTO: 23 % (ref 14–44)
LYMPHOCYTES NFR BLD AUTO: 23 % (ref 21–51)
LYMPHOCYTES NFR BLD AUTO: 24 % (ref 21–51)
LYMPHOCYTES NFR BLD AUTO: 26 % (ref 21–51)
MAGNESIUM SERPL-MCNC: 1.7 MG/DL (ref 1.9–2.7)
MAGNESIUM SERPL-MCNC: 1.7 MG/DL (ref 1.9–2.7)
MAGNESIUM SERPL-MCNC: 2.1 MG/DL (ref 1.6–2.6)
MAGNESIUM SERPL-MCNC: 2.1 MG/DL (ref 1.6–2.6)
MAGNESIUM SERPL-MCNC: 2.1 MG/DL (ref 1.9–2.7)
MAGNESIUM SERPL-MCNC: 2.1 MG/DL (ref 1.9–2.7)
MAGNESIUM SERPL-MCNC: 2.4 MG/DL (ref 1.9–2.7)
MCH RBC QN AUTO: 29.3 PG (ref 26.8–34.3)
MCH RBC QN AUTO: 29.5 PG (ref 26–34)
MCH RBC QN AUTO: 29.6 PG (ref 26–34)
MCH RBC QN AUTO: 29.7 PG (ref 26–34)
MCH RBC QN AUTO: 29.8 PG (ref 26.8–34.3)
MCH RBC QN AUTO: 29.9 PG (ref 26–34)
MCH RBC QN AUTO: 30 PG (ref 26.8–34.3)
MCH RBC QN AUTO: 30.1 PG (ref 26.8–34.3)
MCH RBC QN AUTO: 30.1 PG (ref 26.8–34.3)
MCH RBC QN AUTO: 30.1 PG (ref 26–34)
MCH RBC QN AUTO: 30.1 PG (ref 26–34)
MCH RBC QN AUTO: 30.2 PG (ref 26.8–34.3)
MCH RBC QN AUTO: 30.2 PG (ref 26–34)
MCH RBC QN AUTO: 30.3 PG (ref 26.8–34.3)
MCH RBC QN AUTO: 30.3 PG (ref 26.8–34.3)
MCH RBC QN AUTO: 30.3 PG (ref 26–34)
MCH RBC QN AUTO: 30.5 PG (ref 26–34)
MCH RBC QN AUTO: 30.9 PG (ref 26–34)
MCHC RBC AUTO-ENTMCNC: 31.5 G/DL (ref 31.4–37.4)
MCHC RBC AUTO-ENTMCNC: 32 G/DL (ref 31.4–37.4)
MCHC RBC AUTO-ENTMCNC: 32.3 G/DL (ref 31.4–37.4)
MCHC RBC AUTO-ENTMCNC: 32.5 G/DL (ref 31.4–37.4)
MCHC RBC AUTO-ENTMCNC: 32.5 G/DL (ref 31–37)
MCHC RBC AUTO-ENTMCNC: 32.6 G/DL (ref 31.4–37.4)
MCHC RBC AUTO-ENTMCNC: 32.8 G/DL (ref 31.4–37.4)
MCHC RBC AUTO-ENTMCNC: 32.8 G/DL (ref 31–37)
MCHC RBC AUTO-ENTMCNC: 32.8 G/DL (ref 31–37)
MCHC RBC AUTO-ENTMCNC: 33 G/DL (ref 31.4–37.4)
MCHC RBC AUTO-ENTMCNC: 33 G/DL (ref 31.4–37.4)
MCHC RBC AUTO-ENTMCNC: 33 G/DL (ref 31–37)
MCHC RBC AUTO-ENTMCNC: 33.1 G/DL (ref 31.4–37.4)
MCHC RBC AUTO-ENTMCNC: 33.1 G/DL (ref 31–37)
MCHC RBC AUTO-ENTMCNC: 33.2 G/DL (ref 31.4–37.4)
MCHC RBC AUTO-ENTMCNC: 33.2 G/DL (ref 31–37)
MCHC RBC AUTO-ENTMCNC: 33.5 G/DL (ref 31–37)
MCHC RBC AUTO-ENTMCNC: 33.5 G/DL (ref 31–37)
MCHC RBC AUTO-ENTMCNC: 33.7 G/DL (ref 31–37)
MCHC RBC AUTO-ENTMCNC: 33.8 G/DL (ref 31–37)
MCV RBC AUTO: 89 FL (ref 81–99)
MCV RBC AUTO: 89 FL (ref 81–99)
MCV RBC AUTO: 90 FL (ref 81–99)
MCV RBC AUTO: 90 FL (ref 81–99)
MCV RBC AUTO: 90 FL (ref 82–98)
MCV RBC AUTO: 91 FL (ref 81–99)
MCV RBC AUTO: 91 FL (ref 82–98)
MCV RBC AUTO: 92 FL (ref 81–99)
MCV RBC AUTO: 92 FL (ref 82–98)
MCV RBC AUTO: 92 FL (ref 82–98)
MCV RBC AUTO: 93 FL (ref 81–99)
MCV RBC AUTO: 93 FL (ref 82–98)
MCV RBC AUTO: 94 FL (ref 82–98)
MICROALBUMIN UR-MCNC: 42.1 MG/L (ref 0–20)
MICROALBUMIN/CREAT 24H UR: 61 MG/G CREATININE (ref 0–30)
MONOCYTES # BLD AUTO: 0.5 THOUSAND/ΜL (ref 0.17–1.22)
MONOCYTES # BLD AUTO: 0.5 THOUSAND/ΜL (ref 0.17–1.22)
MONOCYTES # BLD AUTO: 0.6 THOUSAND/ΜL (ref 0.17–1.22)
MONOCYTES # BLD AUTO: 0.6 THOUSAND/ΜL (ref 0.17–1.22)
MONOCYTES # BLD AUTO: 0.66 THOUSAND/ΜL (ref 0.17–1.22)
MONOCYTES # BLD AUTO: 0.7 THOUSAND/ΜL (ref 0.17–1.22)
MONOCYTES # BLD AUTO: 0.7 THOUSAND/ΜL (ref 0.17–1.22)
MONOCYTES # BLD AUTO: 0.72 THOUSAND/ΜL (ref 0.17–1.22)
MONOCYTES # BLD AUTO: 0.75 THOUSAND/ΜL (ref 0.17–1.22)
MONOCYTES # BLD AUTO: 0.76 THOUSAND/ΜL (ref 0.17–1.22)
MONOCYTES # BLD AUTO: 0.76 THOUSAND/ΜL (ref 0.17–1.22)
MONOCYTES # BLD AUTO: 0.8 THOUSAND/ΜL (ref 0.17–1.22)
MONOCYTES # BLD AUTO: 0.8 THOUSAND/ΜL (ref 0.17–1.22)
MONOCYTES # BLD AUTO: 0.84 THOUSAND/ΜL (ref 0.17–1.22)
MONOCYTES # BLD AUTO: 0.85 THOUSAND/ΜL (ref 0.17–1.22)
MONOCYTES # BLD AUTO: 0.9 THOUSAND/ΜL (ref 0.17–1.22)
MONOCYTES # BLD AUTO: 0.9 THOUSAND/ΜL (ref 0.17–1.22)
MONOCYTES # BLD AUTO: 1.22 THOUSAND/ΜL (ref 0.17–1.22)
MONOCYTES NFR BLD AUTO: 10 % (ref 2–12)
MONOCYTES NFR BLD AUTO: 10 % (ref 4–12)
MONOCYTES NFR BLD AUTO: 11 % (ref 2–12)
MONOCYTES NFR BLD AUTO: 11 % (ref 2–12)
MONOCYTES NFR BLD AUTO: 11 % (ref 4–12)
MONOCYTES NFR BLD AUTO: 11 % (ref 4–12)
MONOCYTES NFR BLD AUTO: 12 % (ref 2–12)
MONOCYTES NFR BLD AUTO: 12 % (ref 2–12)
MONOCYTES NFR BLD AUTO: 13 % (ref 2–12)
MONOCYTES NFR BLD AUTO: 13 % (ref 4–12)
MONOCYTES NFR BLD AUTO: 6 % (ref 4–12)
MONOCYTES NFR BLD AUTO: 7 % (ref 2–12)
MONOCYTES NFR BLD AUTO: 8 % (ref 4–12)
MONOCYTES NFR BLD AUTO: 8 % (ref 4–12)
MONOCYTES NFR BLD AUTO: 9 % (ref 2–12)
MONOCYTES NFR BLD AUTO: 9 % (ref 4–12)
MUCOUS THREADS UR QL AUTO: ABNORMAL
NEUTROPHILS # BLD AUTO: 11.64 THOUSANDS/ΜL (ref 1.85–7.62)
NEUTROPHILS # BLD AUTO: 2.8 THOUSANDS/ΜL (ref 1.4–6.5)
NEUTROPHILS # BLD AUTO: 2.9 THOUSANDS/ΜL (ref 1.4–6.5)
NEUTROPHILS # BLD AUTO: 4 THOUSANDS/ΜL (ref 1.4–6.5)
NEUTROPHILS # BLD AUTO: 4.16 THOUSANDS/ΜL (ref 1.85–7.62)
NEUTROPHILS # BLD AUTO: 4.2 THOUSANDS/ΜL (ref 1.4–6.5)
NEUTROPHILS # BLD AUTO: 4.5 THOUSANDS/ΜL (ref 1.4–6.5)
NEUTROPHILS # BLD AUTO: 4.76 THOUSANDS/ΜL (ref 1.85–7.62)
NEUTROPHILS # BLD AUTO: 5 THOUSANDS/ΜL (ref 1.4–6.5)
NEUTROPHILS # BLD AUTO: 5.18 THOUSANDS/ΜL (ref 1.85–7.62)
NEUTROPHILS # BLD AUTO: 5.2 THOUSANDS/ΜL (ref 1.85–7.62)
NEUTROPHILS # BLD AUTO: 5.2 THOUSANDS/ΜL (ref 1.85–7.62)
NEUTROPHILS # BLD AUTO: 5.6 THOUSANDS/ΜL (ref 1.4–6.5)
NEUTROPHILS # BLD AUTO: 5.87 THOUSANDS/ΜL (ref 1.85–7.62)
NEUTROPHILS # BLD AUTO: 5.97 THOUSANDS/ΜL (ref 1.85–7.62)
NEUTROPHILS # BLD AUTO: 6.18 THOUSANDS/ΜL (ref 1.85–7.62)
NEUTROPHILS # BLD AUTO: 7.1 THOUSANDS/ΜL (ref 1.4–6.5)
NEUTROPHILS # BLD AUTO: 9.46 THOUSANDS/ΜL (ref 1.85–7.62)
NEUTS SEG NFR BLD AUTO: 62 % (ref 43–75)
NEUTS SEG NFR BLD AUTO: 63 % (ref 42–75)
NEUTS SEG NFR BLD AUTO: 64 % (ref 42–75)
NEUTS SEG NFR BLD AUTO: 65 % (ref 43–75)
NEUTS SEG NFR BLD AUTO: 66 % (ref 42–75)
NEUTS SEG NFR BLD AUTO: 66 % (ref 43–75)
NEUTS SEG NFR BLD AUTO: 67 % (ref 43–75)
NEUTS SEG NFR BLD AUTO: 68 % (ref 42–75)
NEUTS SEG NFR BLD AUTO: 70 % (ref 42–75)
NEUTS SEG NFR BLD AUTO: 70 % (ref 42–75)
NEUTS SEG NFR BLD AUTO: 70 % (ref 43–75)
NEUTS SEG NFR BLD AUTO: 70 % (ref 43–75)
NEUTS SEG NFR BLD AUTO: 71 % (ref 42–75)
NEUTS SEG NFR BLD AUTO: 71 % (ref 43–75)
NEUTS SEG NFR BLD AUTO: 72 % (ref 42–75)
NEUTS SEG NFR BLD AUTO: 73 % (ref 43–75)
NEUTS SEG NFR BLD AUTO: 76 % (ref 43–75)
NEUTS SEG NFR BLD AUTO: 79 % (ref 43–75)
NITRITE UR QL STRIP: NEGATIVE
NON-SQ EPI CELLS URNS QL MICRO: ABNORMAL /HPF
NRBC BLD AUTO-RTO: 0 /100 WBCS
OTHER STN SPEC: ABNORMAL
P AXIS: -2 DEGREES
P AXIS: 59 DEGREES
P AXIS: 62 DEGREES
P AXIS: 63 DEGREES
P AXIS: 78 DEGREES
P AXIS: 85 DEGREES
P AXIS: 99 DEGREES
PH UR STRIP.AUTO: 5 [PH]
PH UR STRIP.AUTO: 5.5 [PH]
PH UR STRIP.AUTO: 7.5 [PH]
PH UR STRIP.AUTO: 8 [PH]
PHOSPHATE SERPL-MCNC: 3.6 MG/DL (ref 2.3–4.1)
PHOSPHATE SERPL-MCNC: 4.5 MG/DL (ref 3–5.5)
PHOSPHATE SERPL-MCNC: 4.9 MG/DL (ref 2.3–4.1)
PLATELET # BLD AUTO: 114 THOUSANDS/UL (ref 149–390)
PLATELET # BLD AUTO: 121 THOUSANDS/UL (ref 149–390)
PLATELET # BLD AUTO: 124 THOUSANDS/UL (ref 149–390)
PLATELET # BLD AUTO: 129 THOUSANDS/UL (ref 149–390)
PLATELET # BLD AUTO: 131 THOUSANDS/UL (ref 149–390)
PLATELET # BLD AUTO: 131 THOUSANDS/UL (ref 149–390)
PLATELET # BLD AUTO: 138 THOUSANDS/UL (ref 149–390)
PLATELET # BLD AUTO: 142 THOUSANDS/UL (ref 149–390)
PLATELET # BLD AUTO: 147 THOUSANDS/UL (ref 149–390)
PLATELET # BLD AUTO: 154 THOUSANDS/UL (ref 149–390)
PLATELET # BLD AUTO: 155 THOUSANDS/UL (ref 149–390)
PLATELET # BLD AUTO: 165 THOUSANDS/UL (ref 149–390)
PLATELET # BLD AUTO: 173 THOUSANDS/UL (ref 149–390)
PLATELET # BLD AUTO: 193 THOUSANDS/UL (ref 149–390)
PLATELET # BLD AUTO: 198 THOUSANDS/UL (ref 149–390)
PLATELET # BLD AUTO: 202 THOUSANDS/UL (ref 149–390)
PLATELET # BLD AUTO: 208 THOUSANDS/UL (ref 149–390)
PLATELET # BLD AUTO: 213 THOUSANDS/UL (ref 149–390)
PLATELET # BLD AUTO: 216 THOUSANDS/UL (ref 149–390)
PLATELET # BLD AUTO: 222 THOUSANDS/UL (ref 149–390)
PMV BLD AUTO: 10.1 FL (ref 8.9–12.7)
PMV BLD AUTO: 10.8 FL (ref 8.9–12.7)
PMV BLD AUTO: 11.1 FL (ref 8.9–12.7)
PMV BLD AUTO: 11.2 FL (ref 8.9–12.7)
PMV BLD AUTO: 11.3 FL (ref 8.9–12.7)
PMV BLD AUTO: 11.4 FL (ref 8.9–12.7)
PMV BLD AUTO: 11.4 FL (ref 8.9–12.7)
PMV BLD AUTO: 11.7 FL (ref 8.9–12.7)
PMV BLD AUTO: 7.6 FL (ref 8.6–11.7)
PMV BLD AUTO: 7.7 FL (ref 8.6–11.7)
PMV BLD AUTO: 7.8 FL (ref 8.6–11.7)
PMV BLD AUTO: 7.8 FL (ref 8.6–11.7)
PMV BLD AUTO: 8.1 FL (ref 8.6–11.7)
PMV BLD AUTO: 8.3 FL (ref 8.6–11.7)
PMV BLD AUTO: 8.6 FL (ref 8.6–11.7)
PMV BLD AUTO: 8.7 FL (ref 8.6–11.7)
PMV BLD AUTO: 8.9 FL (ref 8.6–11.7)
PMV BLD AUTO: 8.9 FL (ref 8.6–11.7)
POTASSIUM SERPL-SCNC: 3.3 MMOL/L (ref 3.5–5.5)
POTASSIUM SERPL-SCNC: 3.3 MMOL/L (ref 3.5–5.5)
POTASSIUM SERPL-SCNC: 3.5 MMOL/L (ref 3.5–5.3)
POTASSIUM SERPL-SCNC: 3.5 MMOL/L (ref 3.5–5.5)
POTASSIUM SERPL-SCNC: 3.6 MMOL/L (ref 3.5–5.3)
POTASSIUM SERPL-SCNC: 3.6 MMOL/L (ref 3.5–5.5)
POTASSIUM SERPL-SCNC: 3.7 MMOL/L (ref 3.5–5.3)
POTASSIUM SERPL-SCNC: 3.7 MMOL/L (ref 3.5–5.3)
POTASSIUM SERPL-SCNC: 3.7 MMOL/L (ref 3.5–5.5)
POTASSIUM SERPL-SCNC: 3.7 MMOL/L (ref 3.5–5.5)
POTASSIUM SERPL-SCNC: 3.8 MMOL/L (ref 3.5–5.3)
POTASSIUM SERPL-SCNC: 3.9 MMOL/L (ref 3.5–5.3)
POTASSIUM SERPL-SCNC: 3.9 MMOL/L (ref 3.5–5.5)
POTASSIUM SERPL-SCNC: 4 MMOL/L (ref 3.5–5.3)
POTASSIUM SERPL-SCNC: 4 MMOL/L (ref 3.5–5.3)
POTASSIUM SERPL-SCNC: 4 MMOL/L (ref 3.5–5.5)
POTASSIUM SERPL-SCNC: 4 MMOL/L (ref 3.5–5.5)
POTASSIUM SERPL-SCNC: 4.5 MMOL/L (ref 3.5–5.5)
POTASSIUM SERPL-SCNC: 5 MMOL/L (ref 3.5–5.5)
PR INTERVAL: 136 MS
PR INTERVAL: 144 MS
PR INTERVAL: 154 MS
PR INTERVAL: 162 MS
PR INTERVAL: 166 MS
PR INTERVAL: 168 MS
PR INTERVAL: 172 MS
PROCALCITONIN SERPL-MCNC: 0.11 NG/ML
PROCALCITONIN SERPL-MCNC: 0.15 NG/ML
PROCALCITONIN SERPL-MCNC: 0.2 NG/ML
PROCALCITONIN SERPL-MCNC: 0.22 NG/ML
PROCALCITONIN SERPL-MCNC: 0.22 NG/ML
PROCALCITONIN SERPL-MCNC: 0.24 NG/ML
PROCALCITONIN SERPL-MCNC: 0.29 NG/ML
PROT SERPL-MCNC: 5.4 G/DL (ref 6.4–8.9)
PROT SERPL-MCNC: 5.6 G/DL (ref 6.4–8.9)
PROT SERPL-MCNC: 5.7 G/DL (ref 6.4–8.9)
PROT SERPL-MCNC: 5.9 G/DL (ref 6.4–8.9)
PROT SERPL-MCNC: 6 G/DL (ref 6.4–8.2)
PROT SERPL-MCNC: 6 G/DL (ref 6.4–8.9)
PROT SERPL-MCNC: 6.4 G/DL (ref 6.4–8.9)
PROT SERPL-MCNC: 6.6 G/DL (ref 6.4–8.2)
PROT SERPL-MCNC: 6.7 G/DL (ref 6.4–8.9)
PROT SERPL-MCNC: 6.8 G/DL (ref 6.4–8.2)
PROT SERPL-MCNC: 6.9 G/DL (ref 6.4–8.2)
PROT UR STRIP-MCNC: ABNORMAL MG/DL
PROTHROMBIN TIME: 12.9 SECONDS (ref 11.6–14.5)
PROTHROMBIN TIME: 13.2 SECONDS (ref 11.6–14.5)
PROTHROMBIN TIME: 13.6 SECONDS (ref 11.6–14.5)
PROTHROMBIN TIME: 13.9 SECONDS (ref 11.6–14.5)
PTH-INTACT SERPL-MCNC: 188.2 PG/ML (ref 18.4–80.1)
PTH-INTACT SERPL-MCNC: 247.3 PG/ML (ref 18.4–80.1)
QRS AXIS: -44 DEGREES
QRS AXIS: -54 DEGREES
QRS AXIS: -59 DEGREES
QRS AXIS: -60 DEGREES
QRS AXIS: -60 DEGREES
QRSD INTERVAL: 100 MS
QRSD INTERVAL: 100 MS
QRSD INTERVAL: 102 MS
QRSD INTERVAL: 104 MS
QRSD INTERVAL: 110 MS
QRSD INTERVAL: 114 MS
QRSD INTERVAL: 98 MS
QT INTERVAL: 366 MS
QT INTERVAL: 390 MS
QT INTERVAL: 396 MS
QT INTERVAL: 396 MS
QT INTERVAL: 398 MS
QT INTERVAL: 404 MS
QT INTERVAL: 406 MS
QT INTERVAL: 412 MS
QT INTERVAL: 416 MS
QTC INTERVAL: 427 MS
QTC INTERVAL: 441 MS
QTC INTERVAL: 442 MS
QTC INTERVAL: 444 MS
QTC INTERVAL: 448 MS
QTC INTERVAL: 456 MS
QTC INTERVAL: 457 MS
QTC INTERVAL: 460 MS
QTC INTERVAL: 489 MS
RBC # BLD AUTO: 2.44 MILLION/UL (ref 3.88–5.62)
RBC # BLD AUTO: 2.52 MILLION/UL (ref 3.88–5.62)
RBC # BLD AUTO: 2.63 MILLION/UL (ref 3.88–5.62)
RBC # BLD AUTO: 2.66 MILLION/UL (ref 3.88–5.62)
RBC # BLD AUTO: 2.67 MILLION/UL (ref 4.3–5.9)
RBC # BLD AUTO: 2.72 MILLION/UL (ref 4.3–5.9)
RBC # BLD AUTO: 2.88 MILLION/UL (ref 4.3–5.9)
RBC # BLD AUTO: 2.89 MILLION/UL (ref 3.88–5.62)
RBC # BLD AUTO: 3.05 MILLION/UL (ref 3.88–5.62)
RBC # BLD AUTO: 3.08 MILLION/UL (ref 4.3–5.9)
RBC # BLD AUTO: 3.11 MILLION/UL (ref 3.88–5.62)
RBC # BLD AUTO: 3.14 MILLION/UL (ref 4.3–5.9)
RBC # BLD AUTO: 3.15 MILLION/UL (ref 4.3–5.9)
RBC # BLD AUTO: 3.18 MILLION/UL (ref 4.3–5.9)
RBC # BLD AUTO: 3.22 MILLION/UL (ref 4.3–5.9)
RBC # BLD AUTO: 3.3 MILLION/UL (ref 4.3–5.9)
RBC # BLD AUTO: 3.39 MILLION/UL (ref 3.88–5.62)
RBC # BLD AUTO: 3.43 MILLION/UL (ref 3.88–5.62)
RBC # BLD AUTO: 3.82 MILLION/UL (ref 3.88–5.62)
RBC # BLD AUTO: 3.85 MILLION/UL (ref 4.3–5.9)
RBC #/AREA URNS AUTO: ABNORMAL /HPF
RH BLD: POSITIVE
RH BLD: POSITIVE
RSV RNA RESP QL NAA+PROBE: NEGATIVE
SARS-COV-2 RNA RESP QL NAA+PROBE: POSITIVE
SODIUM 24H UR-SCNC: 105 MOL/L
SODIUM 24H UR-SCNC: 58 MOL/L
SODIUM 24H UR-SCNC: 83 MOL/L
SODIUM SERPL-SCNC: 136 MMOL/L (ref 136–145)
SODIUM SERPL-SCNC: 139 MMOL/L (ref 134–143)
SODIUM SERPL-SCNC: 140 MMOL/L (ref 134–143)
SODIUM SERPL-SCNC: 140 MMOL/L (ref 136–145)
SODIUM SERPL-SCNC: 141 MMOL/L (ref 134–143)
SODIUM SERPL-SCNC: 141 MMOL/L (ref 136–145)
SODIUM SERPL-SCNC: 142 MMOL/L (ref 134–143)
SODIUM SERPL-SCNC: 142 MMOL/L (ref 136–145)
SODIUM SERPL-SCNC: 143 MMOL/L (ref 134–143)
SODIUM SERPL-SCNC: 143 MMOL/L (ref 134–143)
SODIUM SERPL-SCNC: 144 MMOL/L (ref 134–143)
SODIUM SERPL-SCNC: 144 MMOL/L (ref 136–145)
SP GR UR STRIP.AUTO: 1.01 (ref 1–1.03)
SP GR UR STRIP.AUTO: 1.01 (ref 1–1.03)
SP GR UR STRIP.AUTO: 1.02 (ref 1–1.03)
SP GR UR STRIP.AUTO: 1.02 (ref 1–1.03)
SPECIMEN EXPIRATION DATE: NORMAL
SPECIMEN EXPIRATION DATE: NORMAL
T WAVE AXIS: 101 DEGREES
T WAVE AXIS: 106 DEGREES
T WAVE AXIS: 109 DEGREES
T WAVE AXIS: 144 DEGREES
T WAVE AXIS: 78 DEGREES
T WAVE AXIS: 79 DEGREES
T WAVE AXIS: 86 DEGREES
T WAVE AXIS: 91 DEGREES
T WAVE AXIS: 94 DEGREES
TIBC SERPL-MCNC: 182 UG/DL (ref 250–450)
TIBC SERPL-MCNC: 254 UG/DL (ref 250–450)
TROPONIN I SERPL-MCNC: 0.03 NG/ML
TROPONIN I SERPL-MCNC: 0.04 NG/ML
TROPONIN I SERPL-MCNC: <0.02 NG/ML
TROPONIN I SERPL-MCNC: <0.02 NG/ML
TROPONIN I SERPL-MCNC: <0.03 NG/ML
UNIT DISPENSE STATUS: NORMAL
UNIT PRODUCT CODE: NORMAL
UNIT RH: NORMAL
URATE SERPL-MCNC: 6.5 MG/DL (ref 4.2–8)
UROBILINOGEN UR QL STRIP.AUTO: 0.2 E.U./DL
UUN 24H UR-MCNC: 324 MG/DL
VENTRICULAR RATE: 68 BPM
VENTRICULAR RATE: 74 BPM
VENTRICULAR RATE: 75 BPM
VENTRICULAR RATE: 76 BPM
VENTRICULAR RATE: 77 BPM
VENTRICULAR RATE: 77 BPM
VENTRICULAR RATE: 78 BPM
VENTRICULAR RATE: 82 BPM
VENTRICULAR RATE: 92 BPM
VIT B12 SERPL-MCNC: 1264 PG/ML (ref 100–900)
WBC # BLD AUTO: 12.28 THOUSAND/UL (ref 4.31–10.16)
WBC # BLD AUTO: 14.6 THOUSAND/UL (ref 4.31–10.16)
WBC # BLD AUTO: 4.2 THOUSAND/UL (ref 4.8–10.8)
WBC # BLD AUTO: 4.2 THOUSAND/UL (ref 4.8–10.8)
WBC # BLD AUTO: 4.4 THOUSAND/UL (ref 4.8–10.8)
WBC # BLD AUTO: 5.8 THOUSAND/UL (ref 4.8–10.8)
WBC # BLD AUTO: 5.9 THOUSAND/UL (ref 4.8–10.8)
WBC # BLD AUTO: 5.9 THOUSAND/UL (ref 4.8–10.8)
WBC # BLD AUTO: 6.6 THOUSAND/UL (ref 4.8–10.8)
WBC # BLD AUTO: 6.69 THOUSAND/UL (ref 4.31–10.16)
WBC # BLD AUTO: 6.95 THOUSAND/UL (ref 4.31–10.16)
WBC # BLD AUTO: 7.29 THOUSAND/UL (ref 4.31–10.16)
WBC # BLD AUTO: 7.98 THOUSAND/UL (ref 4.31–10.16)
WBC # BLD AUTO: 8.01 THOUSAND/UL (ref 4.31–10.16)
WBC # BLD AUTO: 8.1 THOUSAND/UL (ref 4.8–10.8)
WBC # BLD AUTO: 8.15 THOUSAND/UL (ref 4.31–10.16)
WBC # BLD AUTO: 8.4 THOUSAND/UL (ref 4.8–10.8)
WBC # BLD AUTO: 8.42 THOUSAND/UL (ref 4.31–10.16)
WBC # BLD AUTO: 9.23 THOUSAND/UL (ref 4.31–10.16)
WBC # BLD AUTO: 9.8 THOUSAND/UL (ref 4.8–10.8)
WBC #/AREA URNS AUTO: ABNORMAL /HPF
WBC CLUMPS # UR AUTO: PRESENT /UL

## 2021-01-01 PROCEDURE — A9540 TC99M MAA: HCPCS

## 2021-01-01 PROCEDURE — 80053 COMPREHEN METABOLIC PANEL: CPT | Performed by: NURSE PRACTITIONER

## 2021-01-01 PROCEDURE — 85025 COMPLETE CBC W/AUTO DIFF WBC: CPT | Performed by: EMERGENCY MEDICINE

## 2021-01-01 PROCEDURE — 87181 SC STD AGAR DILUTION PER AGT: CPT | Performed by: EMERGENCY MEDICINE

## 2021-01-01 PROCEDURE — 85025 COMPLETE CBC W/AUTO DIFF WBC: CPT | Performed by: INTERNAL MEDICINE

## 2021-01-01 PROCEDURE — 71045 X-RAY EXAM CHEST 1 VIEW: CPT

## 2021-01-01 PROCEDURE — 93005 ELECTROCARDIOGRAM TRACING: CPT

## 2021-01-01 PROCEDURE — 84145 PROCALCITONIN (PCT): CPT | Performed by: EMERGENCY MEDICINE

## 2021-01-01 PROCEDURE — 85025 COMPLETE CBC W/AUTO DIFF WBC: CPT

## 2021-01-01 PROCEDURE — 87493 C DIFF AMPLIFIED PROBE: CPT | Performed by: INTERNAL MEDICINE

## 2021-01-01 PROCEDURE — 99214 OFFICE O/P EST MOD 30 MIN: CPT | Performed by: NURSE PRACTITIONER

## 2021-01-01 PROCEDURE — 83550 IRON BINDING TEST: CPT | Performed by: INTERNAL MEDICINE

## 2021-01-01 PROCEDURE — 1111F DSCHRG MED/CURRENT MED MERGE: CPT | Performed by: INTERNAL MEDICINE

## 2021-01-01 PROCEDURE — 36415 COLL VENOUS BLD VENIPUNCTURE: CPT

## 2021-01-01 PROCEDURE — 80048 BASIC METABOLIC PNL TOTAL CA: CPT | Performed by: INTERNAL MEDICINE

## 2021-01-01 PROCEDURE — 87186 SC STD MICRODIL/AGAR DIL: CPT | Performed by: EMERGENCY MEDICINE

## 2021-01-01 PROCEDURE — 99233 SBSQ HOSP IP/OBS HIGH 50: CPT | Performed by: INTERNAL MEDICINE

## 2021-01-01 PROCEDURE — 80048 BASIC METABOLIC PNL TOTAL CA: CPT | Performed by: NURSE PRACTITIONER

## 2021-01-01 PROCEDURE — 82550 ASSAY OF CK (CPK): CPT | Performed by: EMERGENCY MEDICINE

## 2021-01-01 PROCEDURE — 85025 COMPLETE CBC W/AUTO DIFF WBC: CPT | Performed by: STUDENT IN AN ORGANIZED HEALTH CARE EDUCATION/TRAINING PROGRAM

## 2021-01-01 PROCEDURE — 02H633Z INSERTION OF INFUSION DEVICE INTO RIGHT ATRIUM, PERCUTANEOUS APPROACH: ICD-10-PCS | Performed by: RADIOLOGY

## 2021-01-01 PROCEDURE — 85027 COMPLETE CBC AUTOMATED: CPT | Performed by: INTERNAL MEDICINE

## 2021-01-01 PROCEDURE — G1004 CDSM NDSC: HCPCS

## 2021-01-01 PROCEDURE — 82948 REAGENT STRIP/BLOOD GLUCOSE: CPT

## 2021-01-01 PROCEDURE — 80053 COMPREHEN METABOLIC PANEL: CPT | Performed by: HOSPITALIST

## 2021-01-01 PROCEDURE — 83605 ASSAY OF LACTIC ACID: CPT | Performed by: EMERGENCY MEDICINE

## 2021-01-01 PROCEDURE — 83735 ASSAY OF MAGNESIUM: CPT | Performed by: PHYSICIAN ASSISTANT

## 2021-01-01 PROCEDURE — 80053 COMPREHEN METABOLIC PANEL: CPT

## 2021-01-01 PROCEDURE — 85025 COMPLETE CBC W/AUTO DIFF WBC: CPT | Performed by: PHYSICIAN ASSISTANT

## 2021-01-01 PROCEDURE — 82728 ASSAY OF FERRITIN: CPT | Performed by: INTERNAL MEDICINE

## 2021-01-01 PROCEDURE — 93010 ELECTROCARDIOGRAM REPORT: CPT | Performed by: INTERNAL MEDICINE

## 2021-01-01 PROCEDURE — 81001 URINALYSIS AUTO W/SCOPE: CPT | Performed by: EMERGENCY MEDICINE

## 2021-01-01 PROCEDURE — 84100 ASSAY OF PHOSPHORUS: CPT

## 2021-01-01 PROCEDURE — P9016 RBC LEUKOCYTES REDUCED: HCPCS

## 2021-01-01 PROCEDURE — 86140 C-REACTIVE PROTEIN: CPT | Performed by: INTERNAL MEDICINE

## 2021-01-01 PROCEDURE — 36415 COLL VENOUS BLD VENIPUNCTURE: CPT | Performed by: EMERGENCY MEDICINE

## 2021-01-01 PROCEDURE — 97167 OT EVAL HIGH COMPLEX 60 MIN: CPT

## 2021-01-01 PROCEDURE — 96372 THER/PROPH/DIAG INJ SC/IM: CPT

## 2021-01-01 PROCEDURE — 80053 COMPREHEN METABOLIC PANEL: CPT | Performed by: INTERNAL MEDICINE

## 2021-01-01 PROCEDURE — 36415 COLL VENOUS BLD VENIPUNCTURE: CPT | Performed by: PHYSICIAN ASSISTANT

## 2021-01-01 PROCEDURE — 96361 HYDRATE IV INFUSION ADD-ON: CPT

## 2021-01-01 PROCEDURE — 85730 THROMBOPLASTIN TIME PARTIAL: CPT | Performed by: HOSPITALIST

## 2021-01-01 PROCEDURE — 99215 OFFICE O/P EST HI 40 MIN: CPT | Performed by: NURSE PRACTITIONER

## 2021-01-01 PROCEDURE — 83605 ASSAY OF LACTIC ACID: CPT | Performed by: STUDENT IN AN ORGANIZED HEALTH CARE EDUCATION/TRAINING PROGRAM

## 2021-01-01 PROCEDURE — 99232 SBSQ HOSP IP/OBS MODERATE 35: CPT | Performed by: INTERNAL MEDICINE

## 2021-01-01 PROCEDURE — 99222 1ST HOSP IP/OBS MODERATE 55: CPT | Performed by: INTERNAL MEDICINE

## 2021-01-01 PROCEDURE — 76937 US GUIDE VASCULAR ACCESS: CPT | Performed by: RADIOLOGY

## 2021-01-01 PROCEDURE — 85610 PROTHROMBIN TIME: CPT | Performed by: PHYSICIAN ASSISTANT

## 2021-01-01 PROCEDURE — 85014 HEMATOCRIT: CPT | Performed by: INTERNAL MEDICINE

## 2021-01-01 PROCEDURE — 99232 SBSQ HOSP IP/OBS MODERATE 35: CPT | Performed by: NURSE PRACTITIONER

## 2021-01-01 PROCEDURE — 82570 ASSAY OF URINE CREATININE: CPT | Performed by: INTERNAL MEDICINE

## 2021-01-01 PROCEDURE — 87086 URINE CULTURE/COLONY COUNT: CPT | Performed by: EMERGENCY MEDICINE

## 2021-01-01 PROCEDURE — 84484 ASSAY OF TROPONIN QUANT: CPT | Performed by: PHYSICIAN ASSISTANT

## 2021-01-01 PROCEDURE — 99285 EMERGENCY DEPT VISIT HI MDM: CPT | Performed by: PHYSICIAN ASSISTANT

## 2021-01-01 PROCEDURE — 99239 HOSP IP/OBS DSCHRG MGMT >30: CPT | Performed by: STUDENT IN AN ORGANIZED HEALTH CARE EDUCATION/TRAINING PROGRAM

## 2021-01-01 PROCEDURE — 99285 EMERGENCY DEPT VISIT HI MDM: CPT

## 2021-01-01 PROCEDURE — 84540 ASSAY OF URINE/UREA-N: CPT | Performed by: INTERNAL MEDICINE

## 2021-01-01 PROCEDURE — 99223 1ST HOSP IP/OBS HIGH 75: CPT | Performed by: INTERNAL MEDICINE

## 2021-01-01 PROCEDURE — 99214 OFFICE O/P EST MOD 30 MIN: CPT | Performed by: INTERNAL MEDICINE

## 2021-01-01 PROCEDURE — 87040 BLOOD CULTURE FOR BACTERIA: CPT | Performed by: EMERGENCY MEDICINE

## 2021-01-01 PROCEDURE — 85730 THROMBOPLASTIN TIME PARTIAL: CPT | Performed by: PHYSICIAN ASSISTANT

## 2021-01-01 PROCEDURE — 1111F DSCHRG MED/CURRENT MED MERGE: CPT | Performed by: NURSE PRACTITIONER

## 2021-01-01 PROCEDURE — 99232 SBSQ HOSP IP/OBS MODERATE 35: CPT | Performed by: PHYSICIAN ASSISTANT

## 2021-01-01 PROCEDURE — 85610 PROTHROMBIN TIME: CPT | Performed by: EMERGENCY MEDICINE

## 2021-01-01 PROCEDURE — 76937 US GUIDE VASCULAR ACCESS: CPT

## 2021-01-01 PROCEDURE — 36430 TRANSFUSION BLD/BLD COMPNT: CPT

## 2021-01-01 PROCEDURE — 80053 COMPREHEN METABOLIC PANEL: CPT | Performed by: PHYSICIAN ASSISTANT

## 2021-01-01 PROCEDURE — 86900 BLOOD TYPING SEROLOGIC ABO: CPT | Performed by: INTERNAL MEDICINE

## 2021-01-01 PROCEDURE — 85379 FIBRIN DEGRADATION QUANT: CPT | Performed by: INTERNAL MEDICINE

## 2021-01-01 PROCEDURE — 0241U HB NFCT DS VIR RESP RNA 4 TRGT: CPT | Performed by: PHYSICIAN ASSISTANT

## 2021-01-01 PROCEDURE — 87086 URINE CULTURE/COLONY COUNT: CPT | Performed by: INTERNAL MEDICINE

## 2021-01-01 PROCEDURE — 85379 FIBRIN DEGRADATION QUANT: CPT | Performed by: EMERGENCY MEDICINE

## 2021-01-01 PROCEDURE — 85610 PROTHROMBIN TIME: CPT | Performed by: INTERNAL MEDICINE

## 2021-01-01 PROCEDURE — 84145 PROCALCITONIN (PCT): CPT | Performed by: STUDENT IN AN ORGANIZED HEALTH CARE EDUCATION/TRAINING PROGRAM

## 2021-01-01 PROCEDURE — 83605 ASSAY OF LACTIC ACID: CPT | Performed by: PHYSICIAN ASSISTANT

## 2021-01-01 PROCEDURE — 96374 THER/PROPH/DIAG INJ IV PUSH: CPT

## 2021-01-01 PROCEDURE — 86901 BLOOD TYPING SEROLOGIC RH(D): CPT | Performed by: INTERNAL MEDICINE

## 2021-01-01 PROCEDURE — 83735 ASSAY OF MAGNESIUM: CPT

## 2021-01-01 PROCEDURE — 84145 PROCALCITONIN (PCT): CPT | Performed by: INTERNAL MEDICINE

## 2021-01-01 PROCEDURE — 1036F TOBACCO NON-USER: CPT | Performed by: INTERNAL MEDICINE

## 2021-01-01 PROCEDURE — 85730 THROMBOPLASTIN TIME PARTIAL: CPT | Performed by: EMERGENCY MEDICINE

## 2021-01-01 PROCEDURE — 74176 CT ABD & PELVIS W/O CONTRAST: CPT

## 2021-01-01 PROCEDURE — 85025 COMPLETE CBC W/AUTO DIFF WBC: CPT | Performed by: HOSPITALIST

## 2021-01-01 PROCEDURE — 84484 ASSAY OF TROPONIN QUANT: CPT | Performed by: EMERGENCY MEDICINE

## 2021-01-01 PROCEDURE — 97163 PT EVAL HIGH COMPLEX 45 MIN: CPT

## 2021-01-01 PROCEDURE — 94664 DEMO&/EVAL PT USE INHALER: CPT

## 2021-01-01 PROCEDURE — 99285 EMERGENCY DEPT VISIT HI MDM: CPT | Performed by: EMERGENCY MEDICINE

## 2021-01-01 PROCEDURE — 99222 1ST HOSP IP/OBS MODERATE 55: CPT | Performed by: NURSE PRACTITIONER

## 2021-01-01 PROCEDURE — 84300 ASSAY OF URINE SODIUM: CPT | Performed by: INTERNAL MEDICINE

## 2021-01-01 PROCEDURE — 99223 1ST HOSP IP/OBS HIGH 75: CPT | Performed by: STUDENT IN AN ORGANIZED HEALTH CARE EDUCATION/TRAINING PROGRAM

## 2021-01-01 PROCEDURE — 96365 THER/PROPH/DIAG IV INF INIT: CPT

## 2021-01-01 PROCEDURE — 87077 CULTURE AEROBIC IDENTIFY: CPT | Performed by: EMERGENCY MEDICINE

## 2021-01-01 PROCEDURE — 94760 N-INVAS EAR/PLS OXIMETRY 1: CPT

## 2021-01-01 PROCEDURE — 99223 1ST HOSP IP/OBS HIGH 75: CPT | Performed by: PHYSICIAN ASSISTANT

## 2021-01-01 PROCEDURE — 97530 THERAPEUTIC ACTIVITIES: CPT

## 2021-01-01 PROCEDURE — 82550 ASSAY OF CK (CPK): CPT | Performed by: INTERNAL MEDICINE

## 2021-01-01 PROCEDURE — 82272 OCCULT BLD FECES 1-3 TESTS: CPT | Performed by: INTERNAL MEDICINE

## 2021-01-01 PROCEDURE — 1160F RVW MEDS BY RX/DR IN RCRD: CPT | Performed by: INTERNAL MEDICINE

## 2021-01-01 PROCEDURE — NC001 PR NO CHARGE: Performed by: RADIOLOGY

## 2021-01-01 PROCEDURE — 84300 ASSAY OF URINE SODIUM: CPT | Performed by: NURSE PRACTITIONER

## 2021-01-01 PROCEDURE — 85018 HEMOGLOBIN: CPT | Performed by: INTERNAL MEDICINE

## 2021-01-01 PROCEDURE — 83540 ASSAY OF IRON: CPT | Performed by: INTERNAL MEDICINE

## 2021-01-01 PROCEDURE — 36556 INSERT NON-TUNNEL CV CATH: CPT | Performed by: RADIOLOGY

## 2021-01-01 PROCEDURE — 80053 COMPREHEN METABOLIC PANEL: CPT | Performed by: EMERGENCY MEDICINE

## 2021-01-01 PROCEDURE — 84145 PROCALCITONIN (PCT): CPT | Performed by: PHYSICIAN ASSISTANT

## 2021-01-01 PROCEDURE — 82043 UR ALBUMIN QUANTITATIVE: CPT | Performed by: INTERNAL MEDICINE

## 2021-01-01 PROCEDURE — C1751 CATH, INF, PER/CENT/MIDLINE: HCPCS

## 2021-01-01 PROCEDURE — 86850 RBC ANTIBODY SCREEN: CPT | Performed by: INTERNAL MEDICINE

## 2021-01-01 PROCEDURE — 87186 SC STD MICRODIL/AGAR DIL: CPT | Performed by: PHYSICIAN ASSISTANT

## 2021-01-01 PROCEDURE — 71250 CT THORAX DX C-: CPT

## 2021-01-01 PROCEDURE — 80048 BASIC METABOLIC PNL TOTAL CA: CPT | Performed by: STUDENT IN AN ORGANIZED HEALTH CARE EDUCATION/TRAINING PROGRAM

## 2021-01-01 PROCEDURE — 3077F SYST BP >= 140 MM HG: CPT | Performed by: INTERNAL MEDICINE

## 2021-01-01 PROCEDURE — 99239 HOSP IP/OBS DSCHRG MGMT >30: CPT | Performed by: INTERNAL MEDICINE

## 2021-01-01 PROCEDURE — 83970 ASSAY OF PARATHORMONE: CPT | Performed by: INTERNAL MEDICINE

## 2021-01-01 PROCEDURE — 83690 ASSAY OF LIPASE: CPT | Performed by: PHYSICIAN ASSISTANT

## 2021-01-01 PROCEDURE — 99232 SBSQ HOSP IP/OBS MODERATE 35: CPT | Performed by: STUDENT IN AN ORGANIZED HEALTH CARE EDUCATION/TRAINING PROGRAM

## 2021-01-01 PROCEDURE — 83735 ASSAY OF MAGNESIUM: CPT | Performed by: HOSPITALIST

## 2021-01-01 PROCEDURE — XW033E5 INTRODUCTION OF REMDESIVIR ANTI-INFECTIVE INTO PERIPHERAL VEIN, PERCUTANEOUS APPROACH, NEW TECHNOLOGY GROUP 5: ICD-10-PCS | Performed by: INTERNAL MEDICINE

## 2021-01-01 PROCEDURE — 86920 COMPATIBILITY TEST SPIN: CPT

## 2021-01-01 PROCEDURE — 99233 SBSQ HOSP IP/OBS HIGH 50: CPT | Performed by: STUDENT IN AN ORGANIZED HEALTH CARE EDUCATION/TRAINING PROGRAM

## 2021-01-01 PROCEDURE — 87086 URINE CULTURE/COLONY COUNT: CPT | Performed by: PHYSICIAN ASSISTANT

## 2021-01-01 PROCEDURE — 83735 ASSAY OF MAGNESIUM: CPT | Performed by: INTERNAL MEDICINE

## 2021-01-01 PROCEDURE — 84550 ASSAY OF BLOOD/URIC ACID: CPT

## 2021-01-01 PROCEDURE — 85379 FIBRIN DEGRADATION QUANT: CPT | Performed by: PHYSICIAN ASSISTANT

## 2021-01-01 PROCEDURE — 82607 VITAMIN B-12: CPT | Performed by: INTERNAL MEDICINE

## 2021-01-01 PROCEDURE — 81001 URINALYSIS AUTO W/SCOPE: CPT | Performed by: INTERNAL MEDICINE

## 2021-01-01 PROCEDURE — 82570 ASSAY OF URINE CREATININE: CPT | Performed by: NURSE PRACTITIONER

## 2021-01-01 PROCEDURE — 81001 URINALYSIS AUTO W/SCOPE: CPT | Performed by: PHYSICIAN ASSISTANT

## 2021-01-01 PROCEDURE — 82306 VITAMIN D 25 HYDROXY: CPT | Performed by: INTERNAL MEDICINE

## 2021-01-01 PROCEDURE — 78580 LUNG PERFUSION IMAGING: CPT

## 2021-01-01 PROCEDURE — 87077 CULTURE AEROBIC IDENTIFY: CPT | Performed by: PHYSICIAN ASSISTANT

## 2021-01-01 PROCEDURE — 84100 ASSAY OF PHOSPHORUS: CPT | Performed by: HOSPITALIST

## 2021-01-01 PROCEDURE — 83970 ASSAY OF PARATHORMONE: CPT

## 2021-01-01 PROCEDURE — 84100 ASSAY OF PHOSPHORUS: CPT | Performed by: INTERNAL MEDICINE

## 2021-01-01 PROCEDURE — 84484 ASSAY OF TROPONIN QUANT: CPT | Performed by: INTERNAL MEDICINE

## 2021-01-01 PROCEDURE — 70450 CT HEAD/BRAIN W/O DYE: CPT

## 2021-01-01 PROCEDURE — 99213 OFFICE O/P EST LOW 20 MIN: CPT | Performed by: NURSE PRACTITIONER

## 2021-01-01 PROCEDURE — 36556 INSERT NON-TUNNEL CV CATH: CPT

## 2021-01-01 PROCEDURE — 83605 ASSAY OF LACTIC ACID: CPT | Performed by: INTERNAL MEDICINE

## 2021-01-01 PROCEDURE — 83735 ASSAY OF MAGNESIUM: CPT | Performed by: NURSE PRACTITIONER

## 2021-01-01 PROCEDURE — 81003 URINALYSIS AUTO W/O SCOPE: CPT | Performed by: PHYSICIAN ASSISTANT

## 2021-01-01 PROCEDURE — 87040 BLOOD CULTURE FOR BACTERIA: CPT | Performed by: PHYSICIAN ASSISTANT

## 2021-01-01 PROCEDURE — 3079F DIAST BP 80-89 MM HG: CPT | Performed by: INTERNAL MEDICINE

## 2021-01-01 PROCEDURE — 84484 ASSAY OF TROPONIN QUANT: CPT | Performed by: HOSPITALIST

## 2021-01-01 RX ORDER — HYDROMORPHONE HCL/PF 1 MG/ML
0.2 SYRINGE (ML) INJECTION EVERY 4 HOURS PRN
Status: DISCONTINUED | OUTPATIENT
Start: 2021-01-01 | End: 2021-01-01 | Stop reason: HOSPADM

## 2021-01-01 RX ORDER — ACETAMINOPHEN 325 MG/1
650 TABLET ORAL EVERY 6 HOURS PRN
Status: DISCONTINUED | OUTPATIENT
Start: 2021-01-01 | End: 2021-01-01 | Stop reason: HOSPADM

## 2021-01-01 RX ORDER — ONDANSETRON 2 MG/ML
4 INJECTION INTRAMUSCULAR; INTRAVENOUS EVERY 6 HOURS PRN
Status: DISCONTINUED | OUTPATIENT
Start: 2021-01-01 | End: 2021-01-01 | Stop reason: HOSPADM

## 2021-01-01 RX ORDER — ASPIRIN 81 MG/1
81 TABLET, CHEWABLE ORAL DAILY
Status: DISCONTINUED | OUTPATIENT
Start: 2021-01-01 | End: 2021-01-01 | Stop reason: HOSPADM

## 2021-01-01 RX ORDER — OLANZAPINE 10 MG/1
2.5 INJECTION, POWDER, LYOPHILIZED, FOR SOLUTION INTRAMUSCULAR 2 TIMES DAILY PRN
Status: DISCONTINUED | OUTPATIENT
Start: 2021-01-01 | End: 2021-01-01 | Stop reason: HOSPADM

## 2021-01-01 RX ORDER — CHOLECALCIFEROL (VITAMIN D3) 10 MCG
1 TABLET ORAL
Status: DISCONTINUED | OUTPATIENT
Start: 2021-01-01 | End: 2021-01-01 | Stop reason: HOSPADM

## 2021-01-01 RX ORDER — POTASSIUM CHLORIDE 20 MEQ/1
20 TABLET, EXTENDED RELEASE ORAL
Status: COMPLETED | OUTPATIENT
Start: 2021-01-01 | End: 2021-01-01

## 2021-01-01 RX ORDER — SODIUM CHLORIDE 9 MG/ML
3 INJECTION INTRAVENOUS EVERY 12 HOURS SCHEDULED
Status: DISCONTINUED | OUTPATIENT
Start: 2021-01-01 | End: 2021-01-01 | Stop reason: HOSPADM

## 2021-01-01 RX ORDER — AMLODIPINE BESYLATE 10 MG/1
10 TABLET ORAL DAILY
Qty: 90 TABLET | Refills: 2 | Status: SHIPPED | OUTPATIENT
Start: 2021-01-01 | End: 2021-01-01

## 2021-01-01 RX ORDER — TAMSULOSIN HYDROCHLORIDE 0.4 MG/1
0.4 CAPSULE ORAL
Status: DISCONTINUED | OUTPATIENT
Start: 2021-01-01 | End: 2021-01-01 | Stop reason: HOSPADM

## 2021-01-01 RX ORDER — ZINC SULFATE 50(220)MG
220 CAPSULE ORAL DAILY
Status: COMPLETED | OUTPATIENT
Start: 2021-01-01 | End: 2021-01-01

## 2021-01-01 RX ORDER — SODIUM CHLORIDE 9 MG/ML
100 INJECTION, SOLUTION INTRAVENOUS CONTINUOUS
Status: DISPENSED | OUTPATIENT
Start: 2021-01-01 | End: 2021-01-01

## 2021-01-01 RX ORDER — MELATONIN
2000 DAILY
Status: DISCONTINUED | OUTPATIENT
Start: 2021-01-01 | End: 2021-01-01 | Stop reason: HOSPADM

## 2021-01-01 RX ORDER — HEPARIN SODIUM 10000 [USP'U]/100ML
3-30 INJECTION, SOLUTION INTRAVENOUS
Status: DISCONTINUED | OUTPATIENT
Start: 2021-01-01 | End: 2021-01-01

## 2021-01-01 RX ORDER — LANREOTIDE ACETATE 120 MG/.5ML
120 INJECTION SUBCUTANEOUS ONCE
Status: CANCELLED | OUTPATIENT
Start: 2021-01-01

## 2021-01-01 RX ORDER — LOPERAMIDE HYDROCHLORIDE 2 MG/1
2 CAPSULE ORAL 3 TIMES DAILY PRN
Qty: 30 CAPSULE | Refills: 0 | Status: SHIPPED | OUTPATIENT
Start: 2021-01-01

## 2021-01-01 RX ORDER — NIFEDIPINE 30 MG/1
60 TABLET, EXTENDED RELEASE ORAL DAILY
Status: DISCONTINUED | OUTPATIENT
Start: 2021-01-01 | End: 2021-01-01 | Stop reason: HOSPADM

## 2021-01-01 RX ORDER — SODIUM CHLORIDE 9 MG/ML
100 INJECTION, SOLUTION INTRAVENOUS CONTINUOUS
Status: DISCONTINUED | OUTPATIENT
Start: 2021-01-01 | End: 2021-01-01

## 2021-01-01 RX ORDER — SODIUM CHLORIDE 450 MG/100ML
75 INJECTION, SOLUTION INTRAVENOUS ONCE
Status: COMPLETED | OUTPATIENT
Start: 2021-01-01 | End: 2021-01-01

## 2021-01-01 RX ORDER — SODIUM CHLORIDE, SODIUM GLUCONATE, SODIUM ACETATE, POTASSIUM CHLORIDE, MAGNESIUM CHLORIDE, SODIUM PHOSPHATE, DIBASIC, AND POTASSIUM PHOSPHATE .53; .5; .37; .037; .03; .012; .00082 G/100ML; G/100ML; G/100ML; G/100ML; G/100ML; G/100ML; G/100ML
75 INJECTION, SOLUTION INTRAVENOUS ONCE
Status: COMPLETED | OUTPATIENT
Start: 2021-01-01 | End: 2021-01-01

## 2021-01-01 RX ORDER — FLUCONAZOLE 40 MG/ML
200 POWDER, FOR SUSPENSION ORAL DAILY
Status: DISCONTINUED | OUTPATIENT
Start: 2021-01-01 | End: 2021-01-01

## 2021-01-01 RX ORDER — ASPIRIN 81 MG/1
81 TABLET ORAL DAILY
Status: DISCONTINUED | OUTPATIENT
Start: 2021-01-01 | End: 2021-01-01 | Stop reason: HOSPADM

## 2021-01-01 RX ORDER — NIFEDIPINE 30 MG/1
60 TABLET, EXTENDED RELEASE ORAL DAILY
Status: DISCONTINUED | OUTPATIENT
Start: 2021-01-01 | End: 2021-01-01

## 2021-01-01 RX ORDER — LOPERAMIDE HYDROCHLORIDE 2 MG/1
2 CAPSULE ORAL 3 TIMES DAILY PRN
Status: DISCONTINUED | OUTPATIENT
Start: 2021-01-01 | End: 2021-01-01 | Stop reason: HOSPADM

## 2021-01-01 RX ORDER — PANTOPRAZOLE SODIUM 40 MG/1
40 TABLET, DELAYED RELEASE ORAL DAILY
Status: DISCONTINUED | OUTPATIENT
Start: 2021-01-01 | End: 2021-01-01 | Stop reason: HOSPADM

## 2021-01-01 RX ORDER — SODIUM BICARBONATE 650 MG/1
650 TABLET ORAL 2 TIMES DAILY
Qty: 90 TABLET | Refills: 1 | Status: SHIPPED | OUTPATIENT
Start: 2021-01-01

## 2021-01-01 RX ORDER — MELATONIN
1000 DAILY
Status: DISCONTINUED | OUTPATIENT
Start: 2021-01-01 | End: 2021-01-01 | Stop reason: HOSPADM

## 2021-01-01 RX ORDER — QUETIAPINE FUMARATE 25 MG/1
12.5 TABLET, FILM COATED ORAL
Status: DISCONTINUED | OUTPATIENT
Start: 2021-01-01 | End: 2021-01-01 | Stop reason: HOSPADM

## 2021-01-01 RX ORDER — ACETAMINOPHEN 325 MG/1
650 TABLET ORAL ONCE
Status: CANCELLED | OUTPATIENT
Start: 2021-01-01

## 2021-01-01 RX ORDER — ONDANSETRON 2 MG/ML
4 INJECTION INTRAMUSCULAR; INTRAVENOUS EVERY 4 HOURS PRN
Status: DISCONTINUED | OUTPATIENT
Start: 2021-01-01 | End: 2021-01-01 | Stop reason: HOSPADM

## 2021-01-01 RX ORDER — HEPARIN SODIUM 5000 [USP'U]/ML
5000 INJECTION, SOLUTION INTRAVENOUS; SUBCUTANEOUS EVERY 8 HOURS SCHEDULED
Status: DISCONTINUED | OUTPATIENT
Start: 2021-01-01 | End: 2021-01-01 | Stop reason: HOSPADM

## 2021-01-01 RX ORDER — HYDRALAZINE HYDROCHLORIDE 20 MG/ML
5 INJECTION INTRAMUSCULAR; INTRAVENOUS EVERY 8 HOURS PRN
Status: DISCONTINUED | OUTPATIENT
Start: 2021-01-01 | End: 2021-01-01 | Stop reason: HOSPADM

## 2021-01-01 RX ORDER — LANREOTIDE ACETATE 120 MG/.5ML
120 INJECTION SUBCUTANEOUS ONCE
Status: COMPLETED | OUTPATIENT
Start: 2021-01-01 | End: 2021-01-01

## 2021-01-01 RX ORDER — ACETAMINOPHEN 325 MG/1
650 TABLET ORAL ONCE
Status: COMPLETED | OUTPATIENT
Start: 2021-01-01 | End: 2021-01-01

## 2021-01-01 RX ORDER — CEFTRIAXONE 1 G/50ML
1000 INJECTION, SOLUTION INTRAVENOUS EVERY 24 HOURS
Status: DISCONTINUED | OUTPATIENT
Start: 2021-01-01 | End: 2021-01-01

## 2021-01-01 RX ORDER — ACETAMINOPHEN 325 MG/1
975 TABLET ORAL ONCE
Status: COMPLETED | OUTPATIENT
Start: 2021-01-01 | End: 2021-01-01

## 2021-01-01 RX ORDER — ASCORBIC ACID 500 MG
1000 TABLET ORAL EVERY 12 HOURS SCHEDULED
Status: DISCONTINUED | OUTPATIENT
Start: 2021-01-01 | End: 2021-01-01

## 2021-01-01 RX ORDER — SODIUM CHLORIDE 9 MG/ML
75 INJECTION, SOLUTION INTRAVENOUS CONTINUOUS
Status: DISPENSED | OUTPATIENT
Start: 2021-01-01 | End: 2021-01-01

## 2021-01-01 RX ORDER — HYDRALAZINE HYDROCHLORIDE 20 MG/ML
5 INJECTION INTRAMUSCULAR; INTRAVENOUS ONCE
Status: COMPLETED | OUTPATIENT
Start: 2021-01-01 | End: 2021-01-01

## 2021-01-01 RX ORDER — MELATONIN
1000 DAILY
Status: DISCONTINUED | OUTPATIENT
Start: 2021-01-01 | End: 2021-01-01

## 2021-01-01 RX ORDER — TAMSULOSIN HYDROCHLORIDE 0.4 MG/1
0.4 CAPSULE ORAL
Qty: 90 CAPSULE | Refills: 3 | Status: SHIPPED | OUTPATIENT
Start: 2021-01-01

## 2021-01-01 RX ORDER — NIFEDIPINE 60 MG/1
60 TABLET, FILM COATED, EXTENDED RELEASE ORAL DAILY
Qty: 90 TABLET | Refills: 3 | Status: SHIPPED | OUTPATIENT
Start: 2021-01-01 | End: 2021-01-01 | Stop reason: HOSPADM

## 2021-01-01 RX ORDER — SODIUM CHLORIDE 9 MG/ML
125 INJECTION, SOLUTION INTRAVENOUS ONCE
Status: COMPLETED | OUTPATIENT
Start: 2021-01-01 | End: 2021-01-01

## 2021-01-01 RX ORDER — SODIUM CHLORIDE 9 MG/ML
125 INJECTION, SOLUTION INTRAVENOUS CONTINUOUS
Status: DISCONTINUED | OUTPATIENT
Start: 2021-01-01 | End: 2021-01-01

## 2021-01-01 RX ORDER — SODIUM CHLORIDE 9 MG/ML
20 INJECTION, SOLUTION INTRAVENOUS ONCE
Status: CANCELLED | OUTPATIENT
Start: 2021-01-01

## 2021-01-01 RX ORDER — SODIUM CHLORIDE 9 MG/ML
50 INJECTION, SOLUTION INTRAVENOUS CONTINUOUS
Status: DISCONTINUED | OUTPATIENT
Start: 2021-01-01 | End: 2021-01-01

## 2021-01-01 RX ORDER — CEFTRIAXONE 1 G/50ML
1000 INJECTION, SOLUTION INTRAVENOUS ONCE
Status: COMPLETED | OUTPATIENT
Start: 2021-01-01 | End: 2021-01-01

## 2021-01-01 RX ORDER — COLCHICINE 0.6 MG/1
0.3 TABLET ORAL DAILY
Status: DISCONTINUED | OUTPATIENT
Start: 2021-01-01 | End: 2021-01-01 | Stop reason: HOSPADM

## 2021-01-01 RX ORDER — SODIUM CHLORIDE 9 MG/ML
75 INJECTION, SOLUTION INTRAVENOUS CONTINUOUS
Status: DISCONTINUED | OUTPATIENT
Start: 2021-01-01 | End: 2021-01-01

## 2021-01-01 RX ORDER — SODIUM BICARBONATE 650 MG/1
650 TABLET ORAL 2 TIMES DAILY
Status: DISCONTINUED | OUTPATIENT
Start: 2021-01-01 | End: 2021-01-01 | Stop reason: HOSPADM

## 2021-01-01 RX ORDER — POTASSIUM CHLORIDE 29.8 MG/ML
40 INJECTION INTRAVENOUS ONCE
Status: COMPLETED | OUTPATIENT
Start: 2021-01-01 | End: 2021-01-01

## 2021-01-01 RX ORDER — ASCORBIC ACID 500 MG
1000 TABLET ORAL EVERY 12 HOURS SCHEDULED
Status: COMPLETED | OUTPATIENT
Start: 2021-01-01 | End: 2021-01-01

## 2021-01-01 RX ORDER — MAGNESIUM SULFATE HEPTAHYDRATE 40 MG/ML
2 INJECTION, SOLUTION INTRAVENOUS ONCE
Status: COMPLETED | OUTPATIENT
Start: 2021-01-01 | End: 2021-01-01

## 2021-01-01 RX ORDER — HYDRALAZINE HYDROCHLORIDE 20 MG/ML
10 INJECTION INTRAMUSCULAR; INTRAVENOUS EVERY 6 HOURS PRN
Status: DISCONTINUED | OUTPATIENT
Start: 2021-01-01 | End: 2021-01-01 | Stop reason: HOSPADM

## 2021-01-01 RX ORDER — NIFEDIPINE 60 MG/1
60 TABLET, EXTENDED RELEASE ORAL DAILY
Qty: 90 TABLET | Refills: 1
Start: 2021-01-01

## 2021-01-01 RX ORDER — VANCOMYCIN HYDROCHLORIDE 125 MG/1
125 CAPSULE ORAL 2 TIMES DAILY
Qty: 6 CAPSULE | Refills: 0 | Status: SHIPPED | OUTPATIENT
Start: 2021-01-01 | End: 2021-01-01

## 2021-01-01 RX ORDER — INSULIN DETEMIR 100 [IU]/ML
18 INJECTION, SOLUTION SUBCUTANEOUS
Start: 2021-01-01 | End: 2021-01-01 | Stop reason: CLARIF

## 2021-01-01 RX ORDER — B COMPLEX, C NO.20/FOLIC ACID 1 MG
1 CAPSULE ORAL
Qty: 90 CAPSULE | Refills: 3 | Status: SHIPPED | OUTPATIENT
Start: 2021-01-01

## 2021-01-01 RX ORDER — SODIUM CHLORIDE 9 MG/ML
75 INJECTION, SOLUTION INTRAVENOUS CONTINUOUS
Status: DISCONTINUED | OUTPATIENT
Start: 2021-01-01 | End: 2021-01-01 | Stop reason: HOSPADM

## 2021-01-01 RX ORDER — LOPERAMIDE HYDROCHLORIDE 2 MG/1
2 CAPSULE ORAL 3 TIMES DAILY
Status: DISCONTINUED | OUTPATIENT
Start: 2021-01-01 | End: 2021-01-01 | Stop reason: HOSPADM

## 2021-01-01 RX ORDER — ZINC SULFATE 50(220)MG
220 CAPSULE ORAL DAILY
Status: DISCONTINUED | OUTPATIENT
Start: 2021-01-01 | End: 2021-01-01 | Stop reason: HOSPADM

## 2021-01-01 RX ORDER — ZINC SULFATE 50(220)MG
220 CAPSULE ORAL DAILY
Qty: 3 CAPSULE | Refills: 0 | Status: SHIPPED | OUTPATIENT
Start: 2021-01-01 | End: 2021-01-01

## 2021-01-01 RX ORDER — POTASSIUM CHLORIDE 20 MEQ/1
20 TABLET, EXTENDED RELEASE ORAL ONCE
Status: COMPLETED | OUTPATIENT
Start: 2021-01-01 | End: 2021-01-01

## 2021-01-01 RX ORDER — COLCHICINE 0.6 MG/1
0.6 TABLET ORAL DAILY
Start: 2021-01-01 | End: 2021-01-01 | Stop reason: HOSPADM

## 2021-01-01 RX ORDER — FLUCONAZOLE 100 MG/1
200 TABLET ORAL ONCE
Status: COMPLETED | OUTPATIENT
Start: 2021-01-01 | End: 2021-01-01

## 2021-01-01 RX ORDER — DOCUSATE SODIUM 100 MG/1
100 CAPSULE, LIQUID FILLED ORAL 2 TIMES DAILY
Status: DISCONTINUED | OUTPATIENT
Start: 2021-01-01 | End: 2021-01-01 | Stop reason: HOSPADM

## 2021-01-01 RX ORDER — SODIUM BICARBONATE 650 MG/1
325 TABLET ORAL 2 TIMES DAILY
Status: DISCONTINUED | OUTPATIENT
Start: 2021-01-01 | End: 2021-01-01 | Stop reason: HOSPADM

## 2021-01-01 RX ORDER — AMLODIPINE BESYLATE 10 MG/1
10 TABLET ORAL DAILY
COMMUNITY
Start: 2021-01-01 | End: 2021-01-01 | Stop reason: SDUPTHER

## 2021-01-01 RX ORDER — LIDOCAINE HYDROCHLORIDE 10 MG/ML
INJECTION, SOLUTION EPIDURAL; INFILTRATION; INTRACAUDAL; PERINEURAL CODE/TRAUMA/SEDATION MEDICATION
Status: COMPLETED | OUTPATIENT
Start: 2021-01-01 | End: 2021-01-01

## 2021-01-01 RX ORDER — LOPERAMIDE HYDROCHLORIDE 2 MG/1
2 CAPSULE ORAL 3 TIMES DAILY PRN
Qty: 30 CAPSULE | Refills: 0 | Status: SHIPPED | OUTPATIENT
Start: 2021-01-01 | End: 2021-01-01 | Stop reason: SDUPTHER

## 2021-01-01 RX ORDER — ZINC SULFATE 50(220)MG
220 CAPSULE ORAL DAILY
Status: DISCONTINUED | OUTPATIENT
Start: 2021-01-01 | End: 2021-01-01

## 2021-01-01 RX ORDER — SODIUM CHLORIDE 9 MG/ML
20 INJECTION, SOLUTION INTRAVENOUS ONCE
Status: COMPLETED | OUTPATIENT
Start: 2021-01-01 | End: 2021-01-01

## 2021-01-01 RX ORDER — LORAZEPAM 2 MG/ML
0.5 INJECTION INTRAMUSCULAR ONCE
Status: COMPLETED | OUTPATIENT
Start: 2021-01-01 | End: 2021-01-01

## 2021-01-01 RX ADMIN — HEPARIN SODIUM 5000 UNITS: 5000 INJECTION INTRAVENOUS; SUBCUTANEOUS at 13:10

## 2021-01-01 RX ADMIN — SODIUM BICARBONATE 325 MG: 650 TABLET ORAL at 08:38

## 2021-01-01 RX ADMIN — SODIUM CHLORIDE 125 ML/HR: 0.9 INJECTION, SOLUTION INTRAVENOUS at 18:59

## 2021-01-01 RX ADMIN — SODIUM BICARBONATE 650 MG TABLET 325 MG: at 17:44

## 2021-01-01 RX ADMIN — SODIUM CHLORIDE 3 ML: 9 INJECTION INTRAMUSCULAR; INTRAVENOUS; SUBCUTANEOUS at 13:22

## 2021-01-01 RX ADMIN — Medication 25 MG: at 09:15

## 2021-01-01 RX ADMIN — INSULIN LISPRO 2 UNITS: 100 INJECTION, SOLUTION INTRAVENOUS; SUBCUTANEOUS at 21:37

## 2021-01-01 RX ADMIN — TAMSULOSIN HYDROCHLORIDE 0.4 MG: 0.4 CAPSULE ORAL at 17:24

## 2021-01-01 RX ADMIN — Medication 1 CAPSULE: at 15:58

## 2021-01-01 RX ADMIN — MAGNESIUM GLUCONATE 500 MG ORAL TABLET 200 MG: 500 TABLET ORAL at 08:42

## 2021-01-01 RX ADMIN — MEROPENEM 500 MG: 500 INJECTION, POWDER, FOR SOLUTION INTRAVENOUS at 13:54

## 2021-01-01 RX ADMIN — MEROPENEM 500 MG: 500 INJECTION, POWDER, FOR SOLUTION INTRAVENOUS at 01:51

## 2021-01-01 RX ADMIN — COLCHICINE 0.3 MG: 0.6 TABLET, FILM COATED ORAL at 09:46

## 2021-01-01 RX ADMIN — SODIUM BICARBONATE 650 MG TABLET 325 MG: at 09:23

## 2021-01-01 RX ADMIN — MAGNESIUM GLUCONATE 500 MG ORAL TABLET 200 MG: 500 TABLET ORAL at 17:19

## 2021-01-01 RX ADMIN — INSULIN LISPRO 3 UNITS: 100 INJECTION, SOLUTION INTRAVENOUS; SUBCUTANEOUS at 17:09

## 2021-01-01 RX ADMIN — SODIUM CHLORIDE 3 ML: 9 INJECTION INTRAMUSCULAR; INTRAVENOUS; SUBCUTANEOUS at 13:24

## 2021-01-01 RX ADMIN — ZINC SULFATE 220 MG (50 MG) CAPSULE 220 MG: CAPSULE at 09:45

## 2021-01-01 RX ADMIN — MAGNESIUM GLUCONATE 500 MG ORAL TABLET 200 MG: 500 TABLET ORAL at 17:01

## 2021-01-01 RX ADMIN — FLUCONAZOLE 200 MG: 40 POWDER, FOR SUSPENSION ORAL at 08:42

## 2021-01-01 RX ADMIN — ZINC SULFATE 220 MG (50 MG) CAPSULE 220 MG: CAPSULE at 09:30

## 2021-01-01 RX ADMIN — ASPIRIN 81 MG CHEWABLE TABLET 81 MG: 81 TABLET CHEWABLE at 09:11

## 2021-01-01 RX ADMIN — SODIUM BICARBONATE 650 MG: 650 TABLET ORAL at 17:24

## 2021-01-01 RX ADMIN — CEFTRIAXONE 1000 MG: 1 INJECTION, SOLUTION INTRAVENOUS at 14:06

## 2021-01-01 RX ADMIN — MAGNESIUM GLUCONATE 500 MG ORAL TABLET 200 MG: 500 TABLET ORAL at 08:53

## 2021-01-01 RX ADMIN — ASPIRIN 81 MG: 81 TABLET, CHEWABLE ORAL at 09:25

## 2021-01-01 RX ADMIN — Medication 12.5 MG: at 21:34

## 2021-01-01 RX ADMIN — HEPARIN SODIUM 5000 UNITS: 5000 INJECTION INTRAVENOUS; SUBCUTANEOUS at 05:48

## 2021-01-01 RX ADMIN — SODIUM BICARBONATE 650 MG TABLET 325 MG: at 17:50

## 2021-01-01 RX ADMIN — MEROPENEM 500 MG: 500 INJECTION, POWDER, FOR SOLUTION INTRAVENOUS at 14:34

## 2021-01-01 RX ADMIN — VITAM B12 100 MCG: 100 TAB at 09:45

## 2021-01-01 RX ADMIN — CEFTRIAXONE SODIUM 1000 MG: 10 INJECTION, POWDER, FOR SOLUTION INTRAVENOUS at 14:26

## 2021-01-01 RX ADMIN — SODIUM BICARBONATE 650 MG TABLET 325 MG: at 17:55

## 2021-01-01 RX ADMIN — Medication 125 MG: at 09:32

## 2021-01-01 RX ADMIN — Medication 12.5 MG: at 21:06

## 2021-01-01 RX ADMIN — HEPARIN SODIUM 5000 UNITS: 5000 INJECTION INTRAVENOUS; SUBCUTANEOUS at 05:39

## 2021-01-01 RX ADMIN — OXYCODONE HYDROCHLORIDE AND ACETAMINOPHEN 1000 MG: 500 TABLET ORAL at 09:45

## 2021-01-01 RX ADMIN — Medication 125 MG: at 08:49

## 2021-01-01 RX ADMIN — INSULIN LISPRO 3 UNITS: 100 INJECTION, SOLUTION INTRAVENOUS; SUBCUTANEOUS at 21:50

## 2021-01-01 RX ADMIN — Medication 12.5 MG: at 21:26

## 2021-01-01 RX ADMIN — ASPIRIN 81 MG: 81 TABLET, CHEWABLE ORAL at 09:38

## 2021-01-01 RX ADMIN — LANREOTIDE ACETATE 120 MG: 120 INJECTION SUBCUTANEOUS at 14:04

## 2021-01-01 RX ADMIN — HEPARIN SODIUM 5000 UNITS: 5000 INJECTION INTRAVENOUS; SUBCUTANEOUS at 22:50

## 2021-01-01 RX ADMIN — Medication 1000 UNITS: at 09:11

## 2021-01-01 RX ADMIN — TAMSULOSIN HYDROCHLORIDE 0.4 MG: 0.4 CAPSULE ORAL at 17:20

## 2021-01-01 RX ADMIN — SODIUM CHLORIDE 125 ML/HR: 0.9 INJECTION, SOLUTION INTRAVENOUS at 18:26

## 2021-01-01 RX ADMIN — SODIUM CHLORIDE 100 MG: 9 INJECTION, SOLUTION INTRAVENOUS at 17:03

## 2021-01-01 RX ADMIN — OXYCODONE HYDROCHLORIDE AND ACETAMINOPHEN 1000 MG: 500 TABLET ORAL at 20:50

## 2021-01-01 RX ADMIN — Medication 12.5 MG: at 23:31

## 2021-01-01 RX ADMIN — HEPARIN SODIUM 5000 UNITS: 5000 INJECTION INTRAVENOUS; SUBCUTANEOUS at 05:45

## 2021-01-01 RX ADMIN — MAGNESIUM GLUCONATE 500 MG ORAL TABLET 200 MG: 500 TABLET ORAL at 09:46

## 2021-01-01 RX ADMIN — INSULIN LISPRO 6 UNITS: 100 INJECTION, SOLUTION INTRAVENOUS; SUBCUTANEOUS at 11:00

## 2021-01-01 RX ADMIN — HEPARIN SODIUM 5000 UNITS: 5000 INJECTION INTRAVENOUS; SUBCUTANEOUS at 06:08

## 2021-01-01 RX ADMIN — ASPIRIN 81 MG: 81 TABLET, COATED ORAL at 08:47

## 2021-01-01 RX ADMIN — Medication 125 MG: at 13:59

## 2021-01-01 RX ADMIN — COLCHICINE 0.3 MG: 0.6 TABLET, FILM COATED ORAL at 09:31

## 2021-01-01 RX ADMIN — SODIUM CHLORIDE 1000 ML: 0.9 INJECTION, SOLUTION INTRAVENOUS at 15:35

## 2021-01-01 RX ADMIN — Medication 125 MG: at 10:00

## 2021-01-01 RX ADMIN — LORAZEPAM 0.5 MG: 2 INJECTION INTRAMUSCULAR; INTRAVENOUS at 03:19

## 2021-01-01 RX ADMIN — REMDESIVIR 200 MG: 100 INJECTION, POWDER, LYOPHILIZED, FOR SOLUTION INTRAVENOUS at 21:54

## 2021-01-01 RX ADMIN — SODIUM BICARBONATE 650 MG TABLET 325 MG: at 08:56

## 2021-01-01 RX ADMIN — POTASSIUM CHLORIDE 20 MEQ: 1500 TABLET, EXTENDED RELEASE ORAL at 12:01

## 2021-01-01 RX ADMIN — Medication 12.5 MG: at 08:50

## 2021-01-01 RX ADMIN — HEPARIN SODIUM 5000 UNITS: 5000 INJECTION INTRAVENOUS; SUBCUTANEOUS at 13:54

## 2021-01-01 RX ADMIN — INSULIN LISPRO 2 UNITS: 100 INJECTION, SOLUTION INTRAVENOUS; SUBCUTANEOUS at 20:52

## 2021-01-01 RX ADMIN — FAMOTIDINE 20 MG: 10 INJECTION INTRAVENOUS at 08:55

## 2021-01-01 RX ADMIN — OLANZAPINE 2.5 MG: 10 INJECTION, POWDER, FOR SOLUTION INTRAMUSCULAR at 15:42

## 2021-01-01 RX ADMIN — SODIUM CHLORIDE 20 ML/HR: 0.9 INJECTION, SOLUTION INTRAVENOUS at 09:10

## 2021-01-01 RX ADMIN — INSULIN LISPRO 3 UNITS: 100 INJECTION, SOLUTION INTRAVENOUS; SUBCUTANEOUS at 10:58

## 2021-01-01 RX ADMIN — Medication 2000 UNITS: at 08:50

## 2021-01-01 RX ADMIN — INSULIN LISPRO 1 UNITS: 100 INJECTION, SOLUTION INTRAVENOUS; SUBCUTANEOUS at 17:00

## 2021-01-01 RX ADMIN — HEPARIN SODIUM 5000 UNITS: 5000 INJECTION INTRAVENOUS; SUBCUTANEOUS at 05:31

## 2021-01-01 RX ADMIN — SODIUM CHLORIDE 1000 ML: 0.9 INJECTION, SOLUTION INTRAVENOUS at 18:38

## 2021-01-01 RX ADMIN — NIFEDIPINE 60 MG: 30 TABLET, FILM COATED, EXTENDED RELEASE ORAL at 08:50

## 2021-01-01 RX ADMIN — TAMSULOSIN HYDROCHLORIDE 0.4 MG: 0.4 CAPSULE ORAL at 17:55

## 2021-01-01 RX ADMIN — SODIUM CHLORIDE 3 ML: 9 INJECTION INTRAMUSCULAR; INTRAVENOUS; SUBCUTANEOUS at 21:27

## 2021-01-01 RX ADMIN — SODIUM BICARBONATE 650 MG TABLET 325 MG: at 17:09

## 2021-01-01 RX ADMIN — INSULIN LISPRO 1 UNITS: 100 INJECTION, SOLUTION INTRAVENOUS; SUBCUTANEOUS at 21:28

## 2021-01-01 RX ADMIN — INSULIN LISPRO 1 UNITS: 100 INJECTION, SOLUTION INTRAVENOUS; SUBCUTANEOUS at 22:18

## 2021-01-01 RX ADMIN — LANREOTIDE ACETATE 120 MG: 120 INJECTION SUBCUTANEOUS at 15:09

## 2021-01-01 RX ADMIN — INSULIN LISPRO 1 UNITS: 100 INJECTION, SOLUTION INTRAVENOUS; SUBCUTANEOUS at 17:21

## 2021-01-01 RX ADMIN — HEPARIN SODIUM 15 UNITS/KG/HR: 10000 INJECTION, SOLUTION INTRAVENOUS at 13:57

## 2021-01-01 RX ADMIN — HEPARIN SODIUM 5000 UNITS: 5000 INJECTION INTRAVENOUS; SUBCUTANEOUS at 21:55

## 2021-01-01 RX ADMIN — Medication 125 MG: at 13:22

## 2021-01-01 RX ADMIN — SODIUM BICARBONATE 325 MG: 650 TABLET ORAL at 08:44

## 2021-01-01 RX ADMIN — OXYCODONE HYDROCHLORIDE AND ACETAMINOPHEN 1000 MG: 500 TABLET ORAL at 22:28

## 2021-01-01 RX ADMIN — CEFTRIAXONE SODIUM 1000 MG: 10 INJECTION, POWDER, FOR SOLUTION INTRAVENOUS at 18:32

## 2021-01-01 RX ADMIN — SODIUM BICARBONATE 650 MG TABLET 325 MG: at 17:24

## 2021-01-01 RX ADMIN — MEROPENEM 500 MG: 500 INJECTION, POWDER, FOR SOLUTION INTRAVENOUS at 15:00

## 2021-01-01 RX ADMIN — MAGNESIUM GLUCONATE 500 MG ORAL TABLET 200 MG: 500 TABLET ORAL at 08:38

## 2021-01-01 RX ADMIN — OXYCODONE HYDROCHLORIDE AND ACETAMINOPHEN 1000 MG: 500 TABLET ORAL at 20:15

## 2021-01-01 RX ADMIN — NIFEDIPINE 60 MG: 30 TABLET, FILM COATED, EXTENDED RELEASE ORAL at 09:30

## 2021-01-01 RX ADMIN — Medication 125 MG: at 09:28

## 2021-01-01 RX ADMIN — LOPERAMIDE HYDROCHLORIDE 2 MG: 2 CAPSULE ORAL at 21:50

## 2021-01-01 RX ADMIN — SODIUM CHLORIDE 75 ML/HR: 0.9 INJECTION, SOLUTION INTRAVENOUS at 01:54

## 2021-01-01 RX ADMIN — VITAM B12 100 MCG: 100 TAB at 08:50

## 2021-01-01 RX ADMIN — SODIUM CHLORIDE 1000 ML: 0.9 INJECTION, SOLUTION INTRAVENOUS at 17:01

## 2021-01-01 RX ADMIN — ASPIRIN 81 MG: 81 TABLET, CHEWABLE ORAL at 08:50

## 2021-01-01 RX ADMIN — Medication 125 MG: at 05:00

## 2021-01-01 RX ADMIN — HEPARIN SODIUM 5000 UNITS: 5000 INJECTION INTRAVENOUS; SUBCUTANEOUS at 05:18

## 2021-01-01 RX ADMIN — MAGNESIUM GLUCONATE 500 MG ORAL TABLET 200 MG: 500 TABLET ORAL at 21:49

## 2021-01-01 RX ADMIN — SODIUM BICARBONATE 650 MG TABLET 325 MG: at 17:26

## 2021-01-01 RX ADMIN — HEPARIN SODIUM 5000 UNITS: 5000 INJECTION INTRAVENOUS; SUBCUTANEOUS at 21:56

## 2021-01-01 RX ADMIN — Medication 12.5 MG: at 21:57

## 2021-01-01 RX ADMIN — OXYCODONE HYDROCHLORIDE AND ACETAMINOPHEN 1000 MG: 500 TABLET ORAL at 21:11

## 2021-01-01 RX ADMIN — INSULIN LISPRO 1 UNITS: 100 INJECTION, SOLUTION INTRAVENOUS; SUBCUTANEOUS at 23:02

## 2021-01-01 RX ADMIN — HEPARIN SODIUM 5000 UNITS: 5000 INJECTION INTRAVENOUS; SUBCUTANEOUS at 22:42

## 2021-01-01 RX ADMIN — INSULIN LISPRO 1 UNITS: 100 INJECTION, SOLUTION INTRAVENOUS; SUBCUTANEOUS at 12:15

## 2021-01-01 RX ADMIN — PANTOPRAZOLE SODIUM 40 MG: 40 TABLET, DELAYED RELEASE ORAL at 09:38

## 2021-01-01 RX ADMIN — Medication 125 MG: at 16:08

## 2021-01-01 RX ADMIN — HEPARIN SODIUM 5000 UNITS: 5000 INJECTION INTRAVENOUS; SUBCUTANEOUS at 05:19

## 2021-01-01 RX ADMIN — TAMSULOSIN HYDROCHLORIDE 0.4 MG: 0.4 CAPSULE ORAL at 17:18

## 2021-01-01 RX ADMIN — LANREOTIDE ACETATE 120 MG: 120 INJECTION SUBCUTANEOUS at 15:50

## 2021-01-01 RX ADMIN — Medication 125 MG: at 20:51

## 2021-01-01 RX ADMIN — HEPARIN SODIUM 5000 UNITS: 5000 INJECTION INTRAVENOUS; SUBCUTANEOUS at 21:54

## 2021-01-01 RX ADMIN — HEPARIN SODIUM 5000 UNITS: 5000 INJECTION INTRAVENOUS; SUBCUTANEOUS at 06:21

## 2021-01-01 RX ADMIN — INSULIN LISPRO 2 UNITS: 100 INJECTION, SOLUTION INTRAVENOUS; SUBCUTANEOUS at 17:27

## 2021-01-01 RX ADMIN — MEROPENEM 500 MG: 500 INJECTION, POWDER, FOR SOLUTION INTRAVENOUS at 01:54

## 2021-01-01 RX ADMIN — NIFEDIPINE 60 MG: 30 TABLET, FILM COATED, EXTENDED RELEASE ORAL at 09:25

## 2021-01-01 RX ADMIN — SODIUM CHLORIDE 50 ML/HR: 0.9 INJECTION, SOLUTION INTRAVENOUS at 18:50

## 2021-01-01 RX ADMIN — SODIUM BICARBONATE 650 MG TABLET 325 MG: at 09:53

## 2021-01-01 RX ADMIN — SODIUM CHLORIDE 100 ML/HR: 0.9 INJECTION, SOLUTION INTRAVENOUS at 22:42

## 2021-01-01 RX ADMIN — HEPARIN SODIUM 5000 UNITS: 5000 INJECTION INTRAVENOUS; SUBCUTANEOUS at 05:38

## 2021-01-01 RX ADMIN — NIFEDIPINE 60 MG: 30 TABLET, FILM COATED, EXTENDED RELEASE ORAL at 09:38

## 2021-01-01 RX ADMIN — HYDRALAZINE HYDROCHLORIDE 5 MG: 20 INJECTION INTRAMUSCULAR; INTRAVENOUS at 15:21

## 2021-01-01 RX ADMIN — INSULIN LISPRO 3 UNITS: 100 INJECTION, SOLUTION INTRAVENOUS; SUBCUTANEOUS at 17:24

## 2021-01-01 RX ADMIN — COLCHICINE 0.3 MG: 0.6 TABLET, FILM COATED ORAL at 08:42

## 2021-01-01 RX ADMIN — HEPARIN SODIUM 5000 UNITS: 5000 INJECTION INTRAVENOUS; SUBCUTANEOUS at 20:51

## 2021-01-01 RX ADMIN — Medication 1 CAPSULE: at 18:27

## 2021-01-01 RX ADMIN — OXYCODONE HYDROCHLORIDE AND ACETAMINOPHEN 1000 MG: 500 TABLET ORAL at 08:47

## 2021-01-01 RX ADMIN — ACETAMINOPHEN 975 MG: 325 TABLET ORAL at 18:32

## 2021-01-01 RX ADMIN — QUETIAPINE FUMARATE 12.5 MG: 25 TABLET ORAL at 20:53

## 2021-01-01 RX ADMIN — HEPARIN SODIUM 5000 UNITS: 5000 INJECTION INTRAVENOUS; SUBCUTANEOUS at 13:59

## 2021-01-01 RX ADMIN — COLCHICINE 0.3 MG: 0.6 TABLET, FILM COATED ORAL at 08:29

## 2021-01-01 RX ADMIN — ASPIRIN 81 MG: 81 TABLET, COATED ORAL at 08:39

## 2021-01-01 RX ADMIN — SODIUM BICARBONATE 650 MG TABLET 325 MG: at 09:38

## 2021-01-01 RX ADMIN — SODIUM BICARBONATE 650 MG TABLET 325 MG: at 09:26

## 2021-01-01 RX ADMIN — LIDOCAINE HYDROCHLORIDE 10 ML: 10 INJECTION, SOLUTION EPIDURAL; INFILTRATION; INTRACAUDAL; PERINEURAL at 17:40

## 2021-01-01 RX ADMIN — CEFTRIAXONE 1000 MG: 1 INJECTION, SOLUTION INTRAVENOUS at 13:59

## 2021-01-01 RX ADMIN — SODIUM CHLORIDE 75 ML/HR: 0.9 INJECTION, SOLUTION INTRAVENOUS at 18:49

## 2021-01-01 RX ADMIN — Medication 2000 UNITS: at 09:44

## 2021-01-01 RX ADMIN — HEPARIN SODIUM 5000 UNITS: 5000 INJECTION INTRAVENOUS; SUBCUTANEOUS at 14:17

## 2021-01-01 RX ADMIN — SODIUM CHLORIDE, SODIUM GLUCONATE, SODIUM ACETATE, POTASSIUM CHLORIDE, MAGNESIUM CHLORIDE, SODIUM PHOSPHATE, DIBASIC, AND POTASSIUM PHOSPHATE 75 ML/HR: .53; .5; .37; .037; .03; .012; .00082 INJECTION, SOLUTION INTRAVENOUS at 09:08

## 2021-01-01 RX ADMIN — Medication 125 MG: at 20:57

## 2021-01-01 RX ADMIN — Medication 12.5 MG: at 21:32

## 2021-01-01 RX ADMIN — Medication 125 MG: at 21:37

## 2021-01-01 RX ADMIN — SODIUM BICARBONATE 325 MG: 650 TABLET ORAL at 09:30

## 2021-01-01 RX ADMIN — HEPARIN SODIUM 5000 UNITS: 5000 INJECTION INTRAVENOUS; SUBCUTANEOUS at 13:29

## 2021-01-01 RX ADMIN — SODIUM CHLORIDE 50 ML/HR: 0.9 INJECTION, SOLUTION INTRAVENOUS at 15:44

## 2021-01-01 RX ADMIN — NIFEDIPINE 60 MG: 30 TABLET, FILM COATED, EXTENDED RELEASE ORAL at 08:30

## 2021-01-01 RX ADMIN — SODIUM BICARBONATE 325 MG: 650 TABLET ORAL at 17:33

## 2021-01-01 RX ADMIN — Medication 1 CAPSULE: at 16:59

## 2021-01-01 RX ADMIN — Medication 100 MCG: at 09:11

## 2021-01-01 RX ADMIN — COLCHICINE 0.3 MG: 0.6 TABLET, FILM COATED ORAL at 08:47

## 2021-01-01 RX ADMIN — SODIUM CHLORIDE 125 ML/HR: 0.9 INJECTION, SOLUTION INTRAVENOUS at 09:30

## 2021-01-01 RX ADMIN — MAGNESIUM GLUCONATE 500 MG ORAL TABLET 200 MG: 500 TABLET ORAL at 17:20

## 2021-01-01 RX ADMIN — PANTOPRAZOLE SODIUM 40 MG: 40 TABLET, DELAYED RELEASE ORAL at 09:31

## 2021-01-01 RX ADMIN — MAGNESIUM GLUCONATE 500 MG ORAL TABLET 200 MG: 500 TABLET ORAL at 09:30

## 2021-01-01 RX ADMIN — Medication 1 CAPSULE: at 18:49

## 2021-01-01 RX ADMIN — SODIUM CHLORIDE 75 ML/HR: 0.9 INJECTION, SOLUTION INTRAVENOUS at 21:11

## 2021-01-01 RX ADMIN — HEPARIN SODIUM 5000 UNITS: 5000 INJECTION INTRAVENOUS; SUBCUTANEOUS at 05:04

## 2021-01-01 RX ADMIN — Medication 1 CAPSULE: at 17:24

## 2021-01-01 RX ADMIN — SODIUM CHLORIDE, SODIUM GLUCONATE, SODIUM ACETATE, POTASSIUM CHLORIDE, MAGNESIUM CHLORIDE, SODIUM PHOSPHATE, DIBASIC, AND POTASSIUM PHOSPHATE 75 ML/HR: .53; .5; .37; .037; .03; .012; .00082 INJECTION, SOLUTION INTRAVENOUS at 14:07

## 2021-01-01 RX ADMIN — INSULIN DETEMIR 5 UNITS: 100 INJECTION, SOLUTION SUBCUTANEOUS at 21:55

## 2021-01-01 RX ADMIN — MAGNESIUM SULFATE IN WATER 2 G: 40 INJECTION, SOLUTION INTRAVENOUS at 11:09

## 2021-01-01 RX ADMIN — HEPARIN SODIUM 5000 UNITS: 5000 INJECTION INTRAVENOUS; SUBCUTANEOUS at 05:30

## 2021-01-01 RX ADMIN — HEPARIN SODIUM 5000 UNITS: 5000 INJECTION INTRAVENOUS; SUBCUTANEOUS at 21:11

## 2021-01-01 RX ADMIN — LOPERAMIDE HYDROCHLORIDE 2 MG: 2 CAPSULE ORAL at 09:10

## 2021-01-01 RX ADMIN — HEPARIN SODIUM 5000 UNITS: 5000 INJECTION INTRAVENOUS; SUBCUTANEOUS at 23:30

## 2021-01-01 RX ADMIN — TAMSULOSIN HYDROCHLORIDE 0.4 MG: 0.4 CAPSULE ORAL at 17:34

## 2021-01-01 RX ADMIN — SODIUM BICARBONATE 650 MG: 650 TABLET ORAL at 09:25

## 2021-01-01 RX ADMIN — COLCHICINE 0.3 MG: 0.6 TABLET, FILM COATED ORAL at 08:39

## 2021-01-01 RX ADMIN — Medication 125 MG: at 08:55

## 2021-01-01 RX ADMIN — TAMSULOSIN HYDROCHLORIDE 0.4 MG: 0.4 CAPSULE ORAL at 18:28

## 2021-01-01 RX ADMIN — ASPIRIN 81 MG: 81 TABLET, COATED ORAL at 08:53

## 2021-01-01 RX ADMIN — SODIUM CHLORIDE 100 MG: 9 INJECTION, SOLUTION INTRAVENOUS at 14:29

## 2021-01-01 RX ADMIN — NIFEDIPINE 60 MG: 30 TABLET, FILM COATED, EXTENDED RELEASE ORAL at 09:23

## 2021-01-01 RX ADMIN — SODIUM BICARBONATE 325 MG: 650 TABLET ORAL at 17:20

## 2021-01-01 RX ADMIN — ZINC SULFATE 220 MG (50 MG) CAPSULE 220 MG: CAPSULE at 09:11

## 2021-01-01 RX ADMIN — HEPARIN SODIUM 5000 UNITS: 5000 INJECTION INTRAVENOUS; SUBCUTANEOUS at 15:58

## 2021-01-01 RX ADMIN — INSULIN LISPRO 1 UNITS: 100 INJECTION, SOLUTION INTRAVENOUS; SUBCUTANEOUS at 11:46

## 2021-01-01 RX ADMIN — NIFEDIPINE 60 MG: 30 TABLET, FILM COATED, EXTENDED RELEASE ORAL at 09:53

## 2021-01-01 RX ADMIN — ZINC SULFATE 220 MG (50 MG) CAPSULE 220 MG: CAPSULE at 08:50

## 2021-01-01 RX ADMIN — PANTOPRAZOLE SODIUM 40 MG: 40 TABLET, DELAYED RELEASE ORAL at 09:11

## 2021-01-01 RX ADMIN — Medication 125 MG: at 09:37

## 2021-01-01 RX ADMIN — ACETAMINOPHEN 975 MG: 325 TABLET, COATED ORAL at 14:16

## 2021-01-01 RX ADMIN — SODIUM CHLORIDE 1000 ML: 0.9 INJECTION, SOLUTION INTRAVENOUS at 14:17

## 2021-01-01 RX ADMIN — QUETIAPINE FUMARATE 12.5 MG: 25 TABLET ORAL at 23:31

## 2021-01-01 RX ADMIN — SODIUM CHLORIDE 100 MG: 9 INJECTION, SOLUTION INTRAVENOUS at 20:12

## 2021-01-01 RX ADMIN — FAMOTIDINE 20 MG: 10 INJECTION INTRAVENOUS at 09:30

## 2021-01-01 RX ADMIN — Medication 2000 UNITS: at 09:31

## 2021-01-01 RX ADMIN — ASPIRIN 81 MG: 81 TABLET, CHEWABLE ORAL at 08:41

## 2021-01-01 RX ADMIN — Medication 125 MG: at 23:35

## 2021-01-01 RX ADMIN — MAGNESIUM GLUCONATE 500 MG ORAL TABLET 200 MG: 500 TABLET ORAL at 17:18

## 2021-01-01 RX ADMIN — ZINC SULFATE 220 MG (50 MG) CAPSULE 220 MG: CAPSULE at 08:47

## 2021-01-01 RX ADMIN — INSULIN LISPRO 1 UNITS: 100 INJECTION, SOLUTION INTRAVENOUS; SUBCUTANEOUS at 09:11

## 2021-01-01 RX ADMIN — DOCUSATE SODIUM 100 MG: 100 CAPSULE, LIQUID FILLED ORAL at 09:11

## 2021-01-01 RX ADMIN — MEROPENEM 500 MG: 500 INJECTION, POWDER, FOR SOLUTION INTRAVENOUS at 04:03

## 2021-01-01 RX ADMIN — MEROPENEM 500 MG: 500 INJECTION, POWDER, FOR SOLUTION INTRAVENOUS at 14:50

## 2021-01-01 RX ADMIN — Medication 125 MG: at 23:02

## 2021-01-01 RX ADMIN — INSULIN LISPRO 3 UNITS: 100 INJECTION, SOLUTION INTRAVENOUS; SUBCUTANEOUS at 12:28

## 2021-01-01 RX ADMIN — Medication 125 MG: at 05:45

## 2021-01-01 RX ADMIN — NIFEDIPINE 60 MG: 30 TABLET, FILM COATED, EXTENDED RELEASE ORAL at 08:44

## 2021-01-01 RX ADMIN — PANTOPRAZOLE SODIUM 40 MG: 40 TABLET, DELAYED RELEASE ORAL at 08:51

## 2021-01-01 RX ADMIN — INSULIN LISPRO 1 UNITS: 100 INJECTION, SOLUTION INTRAVENOUS; SUBCUTANEOUS at 12:25

## 2021-01-01 RX ADMIN — INSULIN LISPRO 1 UNITS: 100 INJECTION, SOLUTION INTRAVENOUS; SUBCUTANEOUS at 17:25

## 2021-01-01 RX ADMIN — HEPARIN SODIUM 5000 UNITS: 5000 INJECTION INTRAVENOUS; SUBCUTANEOUS at 05:00

## 2021-01-01 RX ADMIN — SODIUM BICARBONATE 325 MG: 650 TABLET ORAL at 17:00

## 2021-01-01 RX ADMIN — SODIUM BICARBONATE 325 MG: 650 TABLET ORAL at 09:45

## 2021-01-01 RX ADMIN — Medication 1 CAPSULE: at 17:32

## 2021-01-01 RX ADMIN — OXYCODONE HYDROCHLORIDE AND ACETAMINOPHEN 1000 MG: 500 TABLET ORAL at 09:30

## 2021-01-01 RX ADMIN — LOPERAMIDE HYDROCHLORIDE 2 MG: 2 CAPSULE ORAL at 08:56

## 2021-01-01 RX ADMIN — Medication 125 MG: at 23:54

## 2021-01-01 RX ADMIN — Medication 12.5 MG: at 20:53

## 2021-01-01 RX ADMIN — TAMSULOSIN HYDROCHLORIDE 0.4 MG: 0.4 CAPSULE ORAL at 18:49

## 2021-01-01 RX ADMIN — VITAM B12 100 MCG: 100 TAB at 08:28

## 2021-01-01 RX ADMIN — ASPIRIN 81 MG: 81 TABLET, COATED ORAL at 09:30

## 2021-01-01 RX ADMIN — SODIUM CHLORIDE 75 ML/HR: 0.9 INJECTION, SOLUTION INTRAVENOUS at 04:13

## 2021-01-01 RX ADMIN — POTASSIUM CHLORIDE 20 MEQ: 1500 TABLET, EXTENDED RELEASE ORAL at 15:58

## 2021-01-01 RX ADMIN — ZINC SULFATE 220 MG (50 MG) CAPSULE 220 MG: CAPSULE at 08:28

## 2021-01-01 RX ADMIN — Medication 125 MG: at 21:59

## 2021-01-01 RX ADMIN — ACETAMINOPHEN 650 MG: 325 TABLET ORAL at 08:34

## 2021-01-01 RX ADMIN — HEPARIN SODIUM 5000 UNITS: 5000 INJECTION INTRAVENOUS; SUBCUTANEOUS at 13:45

## 2021-01-01 RX ADMIN — Medication 12.5 MG: at 08:42

## 2021-01-01 RX ADMIN — NIFEDIPINE 60 MG: 30 TABLET, FILM COATED, EXTENDED RELEASE ORAL at 09:31

## 2021-01-01 RX ADMIN — OXYCODONE HYDROCHLORIDE AND ACETAMINOPHEN 1000 MG: 500 TABLET ORAL at 20:33

## 2021-01-01 RX ADMIN — PIPERACILLIN AND TAZOBACTAM 2.25 G: 2; .25 INJECTION, POWDER, LYOPHILIZED, FOR SOLUTION INTRAVENOUS at 20:57

## 2021-01-01 RX ADMIN — SODIUM BICARBONATE 325 MG: 650 TABLET ORAL at 08:51

## 2021-01-01 RX ADMIN — HEPARIN SODIUM 5000 UNITS: 5000 INJECTION INTRAVENOUS; SUBCUTANEOUS at 14:30

## 2021-01-01 RX ADMIN — OXYCODONE HYDROCHLORIDE AND ACETAMINOPHEN 1000 MG: 500 TABLET ORAL at 08:51

## 2021-01-01 RX ADMIN — Medication 125 MG: at 09:59

## 2021-01-01 RX ADMIN — SODIUM CHLORIDE 125 ML/HR: 0.9 INJECTION, SOLUTION INTRAVENOUS at 04:03

## 2021-01-01 RX ADMIN — LOPERAMIDE HYDROCHLORIDE 2 MG: 2 CAPSULE ORAL at 13:57

## 2021-01-01 RX ADMIN — FAMOTIDINE 20 MG: 10 INJECTION INTRAVENOUS at 09:47

## 2021-01-01 RX ADMIN — SODIUM BICARBONATE 325 MG: 650 TABLET ORAL at 08:30

## 2021-01-01 RX ADMIN — SODIUM BICARBONATE 325 MG: 650 TABLET ORAL at 08:47

## 2021-01-01 RX ADMIN — NIFEDIPINE 60 MG: 30 TABLET, FILM COATED, EXTENDED RELEASE ORAL at 09:45

## 2021-01-01 RX ADMIN — INSULIN LISPRO 2 UNITS: 100 INJECTION, SOLUTION INTRAVENOUS; SUBCUTANEOUS at 11:28

## 2021-01-01 RX ADMIN — INSULIN LISPRO 1 UNITS: 100 INJECTION, SOLUTION INTRAVENOUS; SUBCUTANEOUS at 11:51

## 2021-01-01 RX ADMIN — MAGNESIUM GLUCONATE 500 MG ORAL TABLET 200 MG: 500 TABLET ORAL at 08:28

## 2021-01-01 RX ADMIN — INSULIN DETEMIR 5 UNITS: 100 INJECTION, SOLUTION SUBCUTANEOUS at 21:07

## 2021-01-01 RX ADMIN — MAGNESIUM GLUCONATE 500 MG ORAL TABLET 200 MG: 500 TABLET ORAL at 08:43

## 2021-01-01 RX ADMIN — Medication 125 MG: at 21:38

## 2021-01-01 RX ADMIN — MAGNESIUM GLUCONATE 500 MG ORAL TABLET 200 MG: 500 TABLET ORAL at 19:00

## 2021-01-01 RX ADMIN — HEPARIN SODIUM 5000 UNITS: 5000 INJECTION INTRAVENOUS; SUBCUTANEOUS at 05:25

## 2021-01-01 RX ADMIN — HEPARIN SODIUM 5000 UNITS: 5000 INJECTION INTRAVENOUS; SUBCUTANEOUS at 14:47

## 2021-01-01 RX ADMIN — SODIUM BICARBONATE 650 MG TABLET 325 MG: at 22:42

## 2021-01-01 RX ADMIN — HEPARIN SODIUM 5000 UNITS: 5000 INJECTION INTRAVENOUS; SUBCUTANEOUS at 22:31

## 2021-01-01 RX ADMIN — SODIUM CHLORIDE 1000 ML: 0.9 INJECTION, SOLUTION INTRAVENOUS at 19:56

## 2021-01-01 RX ADMIN — Medication 12.5 MG: at 09:38

## 2021-01-01 RX ADMIN — INSULIN LISPRO 2 UNITS: 100 INJECTION, SOLUTION INTRAVENOUS; SUBCUTANEOUS at 14:17

## 2021-01-01 RX ADMIN — SODIUM CHLORIDE 75 ML/HR: 0.9 INJECTION, SOLUTION INTRAVENOUS at 09:02

## 2021-01-01 RX ADMIN — INSULIN LISPRO 2 UNITS: 100 INJECTION, SOLUTION INTRAVENOUS; SUBCUTANEOUS at 12:40

## 2021-01-01 RX ADMIN — SODIUM BICARBONATE 325 MG: 650 TABLET ORAL at 18:28

## 2021-01-01 RX ADMIN — PANTOPRAZOLE SODIUM 40 MG: 40 TABLET, DELAYED RELEASE ORAL at 09:26

## 2021-01-01 RX ADMIN — CEFTRIAXONE 1000 MG: 1 INJECTION, SOLUTION INTRAVENOUS at 13:18

## 2021-01-01 RX ADMIN — SODIUM CHLORIDE 100 MG: 9 INJECTION, SOLUTION INTRAVENOUS at 20:44

## 2021-01-01 RX ADMIN — HYDROMORPHONE HYDROCHLORIDE 0.2 MG: 1 INJECTION, SOLUTION INTRAMUSCULAR; INTRAVENOUS; SUBCUTANEOUS at 22:44

## 2021-01-01 RX ADMIN — MEROPENEM 500 MG: 500 INJECTION, POWDER, FOR SOLUTION INTRAVENOUS at 03:44

## 2021-01-01 RX ADMIN — ASPIRIN 81 MG: 81 TABLET, CHEWABLE ORAL at 09:31

## 2021-01-01 RX ADMIN — POTASSIUM CHLORIDE 40 MEQ: 29.8 INJECTION, SOLUTION INTRAVENOUS at 18:27

## 2021-01-01 RX ADMIN — SODIUM BICARBONATE 650 MG: 650 TABLET ORAL at 09:10

## 2021-01-01 RX ADMIN — ASPIRIN 81 MG: 81 TABLET, COATED ORAL at 08:30

## 2021-01-01 RX ADMIN — CEFTRIAXONE 1000 MG: 1 INJECTION, SOLUTION INTRAVENOUS at 15:36

## 2021-01-01 RX ADMIN — Medication 12.5 MG: at 09:26

## 2021-01-01 RX ADMIN — INSULIN LISPRO 1 UNITS: 100 INJECTION, SOLUTION INTRAVENOUS; SUBCUTANEOUS at 17:37

## 2021-01-01 RX ADMIN — Medication 12.5 MG: at 13:49

## 2021-01-01 RX ADMIN — ACETAMINOPHEN 650 MG: 325 TABLET ORAL at 09:13

## 2021-01-01 RX ADMIN — VITAM B12 100 MCG: 100 TAB at 09:30

## 2021-01-01 RX ADMIN — TAMSULOSIN HYDROCHLORIDE 0.4 MG: 0.4 CAPSULE ORAL at 16:59

## 2021-01-01 RX ADMIN — NIFEDIPINE 60 MG: 30 TABLET, FILM COATED, EXTENDED RELEASE ORAL at 08:56

## 2021-01-01 RX ADMIN — SODIUM CHLORIDE 100 ML/HR: 0.9 INJECTION, SOLUTION INTRAVENOUS at 10:32

## 2021-01-01 RX ADMIN — NIFEDIPINE 60 MG: 30 TABLET, FILM COATED, EXTENDED RELEASE ORAL at 09:10

## 2021-01-01 RX ADMIN — FLUCONAZOLE 200 MG: 100 TABLET ORAL at 14:15

## 2021-01-01 RX ADMIN — Medication 125 MG: at 09:22

## 2021-01-01 RX ADMIN — INSULIN LISPRO 3 UNITS: 100 INJECTION, SOLUTION INTRAVENOUS; SUBCUTANEOUS at 22:32

## 2021-01-01 RX ADMIN — Medication 2000 UNITS: at 08:28

## 2021-01-01 RX ADMIN — HEPARIN SODIUM 5000 UNITS: 5000 INJECTION INTRAVENOUS; SUBCUTANEOUS at 21:27

## 2021-01-01 RX ADMIN — Medication 12.5 MG: at 09:11

## 2021-01-01 RX ADMIN — TAMSULOSIN HYDROCHLORIDE 0.4 MG: 0.4 CAPSULE ORAL at 17:26

## 2021-01-01 RX ADMIN — INSULIN LISPRO 1 UNITS: 100 INJECTION, SOLUTION INTRAVENOUS; SUBCUTANEOUS at 17:20

## 2021-01-01 RX ADMIN — INSULIN LISPRO 3 UNITS: 100 INJECTION, SOLUTION INTRAVENOUS; SUBCUTANEOUS at 17:17

## 2021-01-01 RX ADMIN — INSULIN LISPRO 2 UNITS: 100 INJECTION, SOLUTION INTRAVENOUS; SUBCUTANEOUS at 21:56

## 2021-01-01 RX ADMIN — ASPIRIN 81 MG: 81 TABLET, CHEWABLE ORAL at 09:54

## 2021-01-01 RX ADMIN — SODIUM CHLORIDE 125 ML/HR: 0.9 INJECTION, SOLUTION INTRAVENOUS at 13:57

## 2021-01-01 RX ADMIN — SODIUM BICARBONATE 650 MG: 650 TABLET ORAL at 18:49

## 2021-01-01 RX ADMIN — Medication 1 CAPSULE: at 17:20

## 2021-01-01 RX ADMIN — ONDANSETRON 4 MG: 2 INJECTION INTRAMUSCULAR; INTRAVENOUS at 17:04

## 2021-01-01 RX ADMIN — Medication 2000 UNITS: at 08:39

## 2021-01-01 RX ADMIN — SODIUM CHLORIDE 50 ML/HR: 0.9 INJECTION, SOLUTION INTRAVENOUS at 13:57

## 2021-01-01 RX ADMIN — Medication 12.5 MG: at 21:50

## 2021-01-01 RX ADMIN — HEPARIN SODIUM 5000 UNITS: 5000 INJECTION INTRAVENOUS; SUBCUTANEOUS at 16:08

## 2021-01-01 RX ADMIN — HYDROMORPHONE HYDROCHLORIDE 0.2 MG: 1 INJECTION, SOLUTION INTRAMUSCULAR; INTRAVENOUS; SUBCUTANEOUS at 00:57

## 2021-01-01 RX ADMIN — ZINC SULFATE 220 MG (50 MG) CAPSULE 220 MG: CAPSULE at 08:44

## 2021-01-01 RX ADMIN — SODIUM BICARBONATE 325 MG: 650 TABLET ORAL at 19:00

## 2021-01-01 RX ADMIN — MAGNESIUM GLUCONATE 500 MG ORAL TABLET 200 MG: 500 TABLET ORAL at 08:45

## 2021-01-01 RX ADMIN — Medication 125 MG: at 23:29

## 2021-01-01 RX ADMIN — TAMSULOSIN HYDROCHLORIDE 0.4 MG: 0.4 CAPSULE ORAL at 17:21

## 2021-01-01 RX ADMIN — MEROPENEM 500 MG: 500 INJECTION, POWDER, FOR SOLUTION INTRAVENOUS at 02:30

## 2021-01-01 RX ADMIN — SODIUM CHLORIDE 75 ML/HR: 0.45 INJECTION, SOLUTION INTRAVENOUS at 16:03

## 2021-01-01 RX ADMIN — OXYCODONE HYDROCHLORIDE AND ACETAMINOPHEN 1000 MG: 500 TABLET ORAL at 21:35

## 2021-01-01 RX ADMIN — NIFEDIPINE 60 MG: 30 TABLET, FILM COATED, EXTENDED RELEASE ORAL at 08:38

## 2021-01-01 RX ADMIN — PANTOPRAZOLE SODIUM 40 MG: 40 TABLET, DELAYED RELEASE ORAL at 09:27

## 2021-01-01 RX ADMIN — HEPARIN SODIUM 5000 UNITS: 5000 INJECTION INTRAVENOUS; SUBCUTANEOUS at 23:02

## 2021-01-01 RX ADMIN — CEFTRIAXONE 1000 MG: 1 INJECTION, SOLUTION INTRAVENOUS at 13:10

## 2021-01-01 RX ADMIN — Medication 125 MG: at 09:09

## 2021-01-01 RX ADMIN — DOCUSATE SODIUM 100 MG: 100 CAPSULE, LIQUID FILLED ORAL at 17:24

## 2021-01-01 RX ADMIN — SODIUM CHLORIDE 500 ML: 0.9 INJECTION, SOLUTION INTRAVENOUS at 14:30

## 2021-01-01 RX ADMIN — SODIUM BICARBONATE 650 MG TABLET 325 MG: at 17:34

## 2021-01-01 RX ADMIN — SODIUM BICARBONATE 325 MG: 650 TABLET ORAL at 20:47

## 2021-01-01 RX ADMIN — Medication 12.5 MG: at 09:53

## 2021-01-01 RX ADMIN — HEPARIN SODIUM 18 UNITS/KG/HR: 10000 INJECTION, SOLUTION INTRAVENOUS at 17:09

## 2021-01-01 RX ADMIN — PANTOPRAZOLE SODIUM 40 MG: 40 TABLET, DELAYED RELEASE ORAL at 09:54

## 2021-01-01 RX ADMIN — OXYCODONE HYDROCHLORIDE AND ACETAMINOPHEN 1000 MG: 500 TABLET ORAL at 21:54

## 2021-01-01 RX ADMIN — INSULIN LISPRO 2 UNITS: 100 INJECTION, SOLUTION INTRAVENOUS; SUBCUTANEOUS at 13:16

## 2021-01-01 RX ADMIN — ASPIRIN 81 MG: 81 TABLET, CHEWABLE ORAL at 08:56

## 2021-01-01 RX ADMIN — FAMOTIDINE 20 MG: 10 INJECTION INTRAVENOUS at 08:39

## 2021-01-01 RX ADMIN — Medication 12.5 MG: at 09:23

## 2021-01-01 RX ADMIN — HEPARIN SODIUM 5000 UNITS: 5000 INJECTION INTRAVENOUS; SUBCUTANEOUS at 15:00

## 2021-01-01 RX ADMIN — SODIUM BICARBONATE 650 MG TABLET 325 MG: at 09:31

## 2021-01-01 RX ADMIN — Medication 125 MG: at 20:43

## 2021-01-01 RX ADMIN — POTASSIUM CHLORIDE 20 MEQ: 1500 TABLET, EXTENDED RELEASE ORAL at 14:06

## 2021-01-01 RX ADMIN — Medication 12.5 MG: at 08:55

## 2021-01-01 RX ADMIN — ZINC SULFATE 220 MG (50 MG) CAPSULE 220 MG: CAPSULE at 08:38

## 2021-01-01 RX ADMIN — NIFEDIPINE 60 MG: 30 TABLET, FILM COATED, EXTENDED RELEASE ORAL at 08:47

## 2021-01-01 RX ADMIN — COLCHICINE 0.3 MG: 0.6 TABLET, FILM COATED ORAL at 08:52

## 2021-01-01 RX ADMIN — MAGNESIUM GLUCONATE 500 MG ORAL TABLET 200 MG: 500 TABLET ORAL at 18:27

## 2021-01-01 RX ADMIN — SODIUM CHLORIDE 125 ML/HR: 0.9 INJECTION, SOLUTION INTRAVENOUS at 05:43

## 2021-01-01 RX ADMIN — SODIUM BICARBONATE 650 MG TABLET 325 MG: at 08:41

## 2021-01-01 RX ADMIN — INSULIN LISPRO 1 UNITS: 100 INJECTION, SOLUTION INTRAVENOUS; SUBCUTANEOUS at 21:55

## 2021-01-01 RX ADMIN — HEPARIN SODIUM 5000 UNITS: 5000 INJECTION INTRAVENOUS; SUBCUTANEOUS at 22:27

## 2021-01-01 RX ADMIN — TAMSULOSIN HYDROCHLORIDE 0.4 MG: 0.4 CAPSULE ORAL at 17:09

## 2021-01-01 RX ADMIN — OLANZAPINE 2.5 MG: 10 INJECTION, POWDER, FOR SOLUTION INTRAMUSCULAR at 00:59

## 2021-01-01 RX ADMIN — INSULIN LISPRO 2 UNITS: 100 INJECTION, SOLUTION INTRAVENOUS; SUBCUTANEOUS at 22:50

## 2021-01-01 RX ADMIN — Medication 2000 UNITS: at 08:45

## 2021-01-01 RX ADMIN — Medication 125 MG: at 20:15

## 2021-01-01 RX ADMIN — TAMSULOSIN HYDROCHLORIDE 0.4 MG: 0.4 CAPSULE ORAL at 15:58

## 2021-01-01 RX ADMIN — ASPIRIN 81 MG: 81 TABLET, COATED ORAL at 09:46

## 2021-01-01 RX ADMIN — WATER 10 ML: 1 INJECTION INTRAMUSCULAR; INTRAVENOUS; SUBCUTANEOUS at 15:45

## 2021-01-01 RX ADMIN — POTASSIUM CHLORIDE 20 MEQ: 1500 TABLET, EXTENDED RELEASE ORAL at 09:30

## 2021-01-01 RX ADMIN — Medication 125 MG: at 22:32

## 2021-01-01 RX ADMIN — MAGNESIUM GLUCONATE 500 MG ORAL TABLET 200 MG: 500 TABLET ORAL at 17:50

## 2021-01-01 RX ADMIN — Medication 125 MG: at 08:52

## 2021-01-01 RX ADMIN — INSULIN LISPRO 1 UNITS: 100 INJECTION, SOLUTION INTRAVENOUS; SUBCUTANEOUS at 23:45

## 2021-01-01 RX ADMIN — INSULIN LISPRO 2 UNITS: 100 INJECTION, SOLUTION INTRAVENOUS; SUBCUTANEOUS at 22:28

## 2021-01-01 RX ADMIN — Medication 125 MG: at 21:26

## 2021-01-01 RX ADMIN — VITAM B12 100 MCG: 100 TAB at 08:39

## 2021-01-01 RX ADMIN — VITAM B12 100 MCG: 100 TAB at 08:44

## 2021-01-01 RX ADMIN — MAGNESIUM GLUCONATE 500 MG ORAL TABLET 200 MG: 500 TABLET ORAL at 20:47

## 2021-01-01 RX ADMIN — INSULIN LISPRO 1 UNITS: 100 INJECTION, SOLUTION INTRAVENOUS; SUBCUTANEOUS at 16:20

## 2021-01-01 RX ADMIN — HEPARIN SODIUM 5000 UNITS: 5000 INJECTION INTRAVENOUS; SUBCUTANEOUS at 06:33

## 2021-01-01 RX ADMIN — HEPARIN SODIUM 5000 UNITS: 5000 INJECTION INTRAVENOUS; SUBCUTANEOUS at 06:32

## 2021-01-01 RX ADMIN — ASPIRIN 81 MG: 81 TABLET, CHEWABLE ORAL at 09:24

## 2021-01-01 RX ADMIN — Medication 125 MG: at 17:43

## 2021-01-01 RX ADMIN — INSULIN LISPRO 5 UNITS: 100 INJECTION, SOLUTION INTRAVENOUS; SUBCUTANEOUS at 21:07

## 2021-01-01 RX ADMIN — MAGNESIUM GLUCONATE 500 MG ORAL TABLET 200 MG: 500 TABLET ORAL at 08:55

## 2021-01-01 RX ADMIN — ACETAMINOPHEN 650 MG: 325 TABLET ORAL at 10:59

## 2021-01-01 RX ADMIN — INSULIN LISPRO 1 UNITS: 100 INJECTION, SOLUTION INTRAVENOUS; SUBCUTANEOUS at 12:00

## 2021-01-01 RX ADMIN — HEPARIN SODIUM 5000 UNITS: 5000 INJECTION INTRAVENOUS; SUBCUTANEOUS at 13:19

## 2021-01-01 RX ADMIN — OXYCODONE HYDROCHLORIDE AND ACETAMINOPHEN 1000 MG: 500 TABLET ORAL at 08:43

## 2021-01-01 RX ADMIN — QUETIAPINE FUMARATE 12.5 MG: 25 TABLET ORAL at 22:31

## 2021-01-01 RX ADMIN — Medication 2000 UNITS: at 08:43

## 2021-01-01 RX ADMIN — MAGNESIUM GLUCONATE 500 MG ORAL TABLET 200 MG: 500 TABLET ORAL at 17:44

## 2021-01-01 RX ADMIN — ZINC SULFATE 220 MG (50 MG) CAPSULE 220 MG: CAPSULE at 17:24

## 2021-01-01 RX ADMIN — OXYCODONE HYDROCHLORIDE AND ACETAMINOPHEN 1000 MG: 500 TABLET ORAL at 08:28

## 2021-01-01 RX ADMIN — SODIUM CHLORIDE 125 ML/HR: 0.9 INJECTION, SOLUTION INTRAVENOUS at 16:08

## 2021-01-01 RX ADMIN — PIPERACILLIN AND TAZOBACTAM 2.25 G: 2; .25 INJECTION, POWDER, LYOPHILIZED, FOR SOLUTION INTRAVENOUS at 04:41

## 2021-01-01 RX ADMIN — HEPARIN SODIUM 5000 UNITS: 5000 INJECTION INTRAVENOUS; SUBCUTANEOUS at 21:35

## 2021-01-01 RX ADMIN — VITAM B12 100 MCG: 100 TAB at 08:47

## 2021-01-01 RX ADMIN — INSULIN LISPRO 2 UNITS: 100 INJECTION, SOLUTION INTRAVENOUS; SUBCUTANEOUS at 21:35

## 2021-01-01 RX ADMIN — OXYCODONE HYDROCHLORIDE AND ACETAMINOPHEN 1000 MG: 500 TABLET ORAL at 08:38

## 2021-01-01 RX ADMIN — HEPARIN SODIUM 5000 UNITS: 5000 INJECTION INTRAVENOUS; SUBCUTANEOUS at 14:07

## 2021-01-01 RX ADMIN — ASPIRIN 81 MG: 81 TABLET, COATED ORAL at 08:44

## 2021-01-01 RX ADMIN — ACETAMINOPHEN 650 MG: 325 TABLET ORAL at 16:19

## 2021-01-01 RX ADMIN — CEFTRIAXONE 1000 MG: 1 INJECTION, SOLUTION INTRAVENOUS at 13:09

## 2021-01-01 RX ADMIN — FAMOTIDINE 20 MG: 10 INJECTION INTRAVENOUS at 08:43

## 2021-01-01 RX ADMIN — FAMOTIDINE 20 MG: 10 INJECTION INTRAVENOUS at 08:50

## 2021-01-01 RX ADMIN — Medication 12.5 MG: at 22:31

## 2021-01-01 RX ADMIN — NIFEDIPINE 60 MG: 30 TABLET, FILM COATED, EXTENDED RELEASE ORAL at 08:42

## 2021-01-01 RX ADMIN — SODIUM BICARBONATE 650 MG TABLET 325 MG: at 08:50

## 2021-01-01 RX ADMIN — FAMOTIDINE 20 MG: 10 INJECTION INTRAVENOUS at 08:30

## 2021-01-01 RX ADMIN — WATER: 1 INJECTION INTRAMUSCULAR; INTRAVENOUS; SUBCUTANEOUS at 02:09

## 2021-01-01 RX ADMIN — INSULIN LISPRO 1 UNITS: 100 INJECTION, SOLUTION INTRAVENOUS; SUBCUTANEOUS at 08:50

## 2021-01-08 NOTE — TELEPHONE ENCOUNTER
[facility] from the lab called  Patient had labs drawn today for Nephrology  CBC with platelets was drawn  He is calling to see if CBCD should be done since patient has an upcoming Oncology appointment  Order placed for CBCD in [employer]

## 2021-01-12 NOTE — PROGRESS NOTES
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)  Patient will be continued on his lanreotide injections 120 mg deep subcu every 4 weeks for his metastatic neuroendocrine tumor/carcinoid syndrome  Is reporting worsening diarrhea from his baseline about 4-6 episodes per day which may be due to missing his lanreotide injection last month; he will be receiving his injection today after the visit  The patient also recently was hospitalized again with recurrent C diff colitis and completed a 14 day course of oral vancomycin after discharge  His significant other states that she definitely knows when he has C diff due to the odor and consistency which is not evident at this time  Encouraged her to monitor his stools closely and reports any foul smelling/C diff like stools immeidately; she agrees  According to patient's most recent CT imaging of the abdomen and pelvis 12/14/20 (although limited by lack of IV contrast) seems to show stable neuroendocrine tumor with perhaps improvement in his liver metastasis  Patient will be back for follow-up again in 4 weeks with repeat laboratory studies prior  We will order chromogranin A level to be done before the next visit since it was missed this month  - CBC and differential; Future  - Comprehensive metabolic panel; Future  - Chromogranin A; Future    2  Carcinoid syndrome ([address] )  As above  We did again discuss ways to decrease carcinoid syndrome including decreasing stress and monitoring for worsening symptoms after consuming particular foods including bananas and avocados as well as other rich foods  3  Normocytic anemia  Patient continues to have stable normocytic anemia hemoglobin 10 4  His anemia is multifactorial including chronic renal dysfunction and anemia of chronic disease      4  CKD (chronic kidney disease) stage 5, GFR less than 15 ml/min [facility])  Patient will continue to follow-up with his nephrologist on a regular basis regarding his renal dysfunction  HPI:  Patient presents today for a follow-up visit accompanied by his significant other  He was admitted to the hospital again recently 12/14 through 12/16 with syncope, hypotension and recurrent C diff infection  He recently completed a 14 day course of oral vancomycin  The patient continues to have diarrhea typically 4-6 episodes a day which is more than his usual   The wife states that his loose stools are no longer foul smelling like when he had C diff and is more of his usual carcinoid diarrhea  He did miss his lanreotide injection last month which could be contributory to his worsening diarrhea  He denies any chest pain, heart palpitations worsening dyspnea than his usual   His significant other reports that he sometimes gets flushed in his face mainly when he is not feeling well  He continues to have severe fatigue and occasional dizziness which he attributes to his vertigo  His most recent laboratory studies from 01/08/2020 showed normal white cells and platelets, he has stable/chronic slightly improved normocytic anemia H&H 10 4/31 9, MCV 93  Stable chronic renal dysfunction BUN 54, creatinine 4 98, GFR 10, albumin decreased 3 3, alk-phos 127 remaining metabolic panel is normal   His chromogranin A level was not done for some reason  The patient did have CT imaging of the chest abdomen pelvis without contrast during his most recent hospital admission 12/14/2020 which showed:  "IMPRESSION:  Improved small right pleural effusion, and resolved left pleural effusion    Unchanged 4 4 cm ascending aortic aneurysm      Limited study in the absence of intravenous contrast enhancement, however hepatic metastatic disease appears improved      Unchanged known mesenteric carcinoid tumor "    Oncology History Overview Note   Patient has multiple comorbid conditions including history of coronary artery disease, diabetes mellitus, hypertension, hyperlipidemia, and more recently metastatic neuroendocrine tumor       The patient was seen in consultation when he was in the hospital on the 5th February 2019 at that time he had significant weakness status post vasovagal syncope   He was evaluated with a CT scan of the abdomen and pelvis on the 23rd January 2019 which showed partially calcified mesenteric mass with multiple liver lesions compatible most likely with carcinoid malignancy   He also was found to have bilateral hydronephrosis and severe bladder wall thickening with prostatomegaly      The patient then had a core biopsy from 1 of the liver lesions on the 24th of January which showed well-differentiated neuroendocrine tumor grade 1-2 with Ki 67 of 3-5%      His chromogranin level  February 2019 was 273   His 24 hour urine for HIAA was 48 which is above normal   The patient was started on Lanreotide in March 2019         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 ([facility] [address] )   1/24/2019 Initial Diagnosis    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 ([facility] [address] )     1/24/2019 Biopsy    A  Liver mass, core biopsy:  - Well differentiated neuroendocrine tumor, G2        3/2019 -  Chemotherapy    Lanreotide 120 mg every 4 weeks injections          Interval history:    ROS: Review of Systems   Constitutional: Positive for fatigue  Negative for activity change, appetite change, chills, fever and unexpected weight change  HENT: Negative for congestion, mouth sores, nosebleeds, sore throat and trouble swallowing  Eyes: Negative  Respiratory: Positive for shortness of breath  Negative for cough and chest tightness  Cardiovascular: Negative for chest pain, palpitations and leg swelling  Gastrointestinal: Positive for diarrhea  Negative for abdominal distention, abdominal pain, blood in stool, constipation, nausea and vomiting  Genitourinary: Negative for hematuria  Chronic Haile catheter- is being changed/monitored by his urologist   Musculoskeletal: Positive for gait problem (Ambulates with a walker)  Negative for arthralgias, back pain, joint swelling and myalgias  Skin: Negative for color change, pallor and rash  Neurological: Positive for dizziness and numbness (and tingling mild)  Negative for weakness, light-headedness and headaches  Hematological: Negative for adenopathy  Does not bruise/bleed easily  Psychiatric/Behavioral: Negative for dysphoric mood and sleep disturbance  The patient is not nervous/anxious  Past Medical History:   Diagnosis Date    Acute pancreatitis without infection or necrosis 9/26/2019    Age-related nuclear cataract, right 11/5/2020    Anemia of chronic renal failure     BPH (benign prostatic hyperplasia)     Chronic kidney disease (CKD), stage IV (severe)     Coronary artery disease     DJD (degenerative joint disease)     Essential hypertension     Gait disturbance     Generalized weakness     GERD (gastroesophageal reflux disease)     Gout     History of transfusion     Hydronephrosis     Hyperlipidemia     Hypertension     Liver cancer     Pressure injury of skin     Proteinuria     Renal disorder     Retention of urine     Thrombophlebitis migrans     Type 2 diabetes mellitus        Past Surgical History:   Procedure Laterality Date    CATARACT EXTRACTION Right 11/05/2020    CATARACT EXTRACTION W/ INTRAOCULAR LENS IMPLANT Right 11/5/2020    Procedure: EYE OD CATARACT EXTRACTION WITH IOL INSERTION;  Surgeon: Mansi Saavedra MD;  Location: [facility] MAIN OR;  Service: Ophthalmology    CHOLECYSTECTOMY      CHOLECYSTECTOMY LAPAROSCOPIC N/A 2/7/2020    Procedure: CHOLECYSTECTOMY LAPAROSCOPIC;  Surgeon:  Deidre Moore MD;  Location: [facility] MAIN OR;  Service: General    CORONARY ANGIOPLASTY WITH STENT PLACEMENT      FL RETROGRADE PYELOGRAM  5/21/2020    IR BIOPSY LIVER MASS  1/24/2019    IR CHOLECYSTOSTOMY TUBE PLACEMENT  9/6/2019    MN CYSTO/URETERO W/LITHOTRIPSY &INDWELL STENT INSRT Left 5/21/2020    Procedure: CYSTOSCOPY URETEROSCOPY WITH LITHOTRIPSY HOLMIUM LASER, RETROGRADE PYELOGRAM AND INSERTION STENT URETERAL;  Surgeon: Ayo Murillo MD;  Location: [facility] MAIN OR;  Service: Urology    MN CYSTOURETHROSCOPY,URETER CATHETER Left 4/21/2020    Procedure: CYSTOSCOPY WITH INSERTION STENT URETERAL;  Surgeon: Brandi Maldonado MD;  Location: [facility] Main OR;  Service: Urology       Social History     Socioeconomic History    Marital status:       Spouse name: None    Number of children: None    Years of education: None    Highest education level: None   Occupational History    None   Social Needs    Financial resource strain: None    Food insecurity     Worry: None     Inability: None    Transportation needs     Medical: None     Non-medical: None   Tobacco Use    Smoking status: Never Smoker    Smokeless tobacco: Never Used   Substance and Sexual Activity    Alcohol use: Not Currently     Alcohol/week: 0 0 standard drinks     Frequency: Never    Drug use: Never    Sexual activity: Not Currently   Lifestyle    Physical activity     Days per week: None     Minutes per session: None    Stress: None   Relationships    Social connections     Talks on phone: None     Gets together: None     Attends [employer] service: None     Active member of club or organization: None     Attends meetings of clubs or organizations: None     Relationship status: None    Intimate partner violence     Fear of current or ex partner: None     Emotionally abused: None     Physically abused: None     Forced sexual activity: None   Other Topics Concern    None   Social History Narrative    None       Family History   Problem Relation Age of Onset    Cancer Mother     Hypertension Father     Cancer Brother     Cancer Maternal Grandmother     Cancer Paternal Aunt        No Known Allergies      Current Outpatient Medications:     acetaminophen (TYLENOL) 325 mg tablet, Take 650 mg by mouth every 6 (six) hours as needed , Disp: , Rfl:     aspirin 81 MG tablet, Take 81 mg by mouth daily, Disp: , Rfl:     B Complex-C-Folic Acid (TRIPHROCAPS) 1 MG CAPS, Take 1 capsule (1 mg total) by mouth daily with dinner, Disp: 30 each, Rfl: 5    BD INSULIN SYRINGE U/F 30G X 1/2" 0 3 ML MISC, , Disp: , Rfl:     cholecalciferol (VITAMIN D3) 1,000 units tablet, Take 1,000 Units by mouth daily Take 2, Disp: , Rfl:     colchicine (COLCRYS) 0 6 mg tablet, Take 0 5 tablets (0 3 mg total) by mouth daily (Patient taking differently: Take 0 6 mg by mouth daily ), Disp: 90 tablet, Rfl: 1    famotidine (PEPCID) 10 mg tablet, Take 1 tablet (10 mg total) by mouth daily at bedtime, Disp: 30 tablet, Rfl: 0    furosemide (LASIX) 40 mg tablet, Take 1 tablet (40 mg total) by mouth daily , do NOT take if you do not have swelling, Disp: 30 tablet, Rfl: 5    insulin detemir (Levemir) 100 units/mL subcutaneous injection, Inject 5 Units under the skin daily before breakfast (Patient taking differently: Inject 18 Units under the skin daily at bedtime ), Disp: , Rfl: 0    insulin lispro (HumaLOG) 100 units/mL injection, Inject 4 Units under the skin 3 (three) times a day before meals, Disp: , Rfl: 0    Insulin Syringe-Needle U-100 30G X 1/2" 1 ML MISC, by Does not apply route 3 (three) times a day, Disp: 100 each, Rfl: 5    NIFEdipine ER (ADALAT CC) 60 MG 24 hr tablet, Take 1 tablet (60 mg total) by mouth daily, Disp: 30 tablet, Rfl: 3    ofloxacin (OCUFLOX) 0 3 % ophthalmic solution, Administer to the right eye 4 (four) times a day , Disp: , Rfl:     pantoprazole (PROTONIX) 40 mg tablet, Take 1 tablet (40 mg total) by mouth daily, Disp: 90 tablet, Rfl: 1    prednisoLONE acetate (PRED FORTE) 1 % ophthalmic suspension, 4 (four) times a day , Disp: , Rfl:     sodium bicarbonate 650 mg tablet, Take 325 mg by mouth 2 (two) times a day , Disp: , Rfl:    sucralfate (CARAFATE) 1 g/10 mL suspension, Take 10 mL (1,000 mg total) by mouth every 6 (six) hours, Disp: 420 mL, Rfl: 0    tamsulosin (FLOMAX) 0 4 mg, Take 1 capsule (0 4 mg total) by mouth daily with dinner, Disp: 90 capsule, Rfl: 3    vitamin B-12 (VITAMIN B-12) 1,000 mcg tablet, Take 100 mcg by mouth daily , Disp: , Rfl:     magnesium oxide (MAG-OX) 400 mg, Take 1 tablet (400 mg total) by mouth every other day (Patient taking differently: Take 200 mg by mouth 2 (two) times a day ), Disp: 90 tablet, Rfl: 1    meclizine (ANTIVERT) 25 mg tablet, Take 1 tablet (25 mg total) by mouth every 8 (eight) hours as needed for dizziness, Disp: 90 tablet, Rfl: 0      Physical Exam:  /56 (BP Location: Right arm, Patient Position: Sitting, Cuff Size: Large)   Pulse 94   Temp (!) 96 7 °F (35 9 °C) (Tympanic)   Resp 18   Ht 5' 7" (1 702 m)   Wt 76 3 kg (168 lb 4 8 oz)   SpO2 98%   BMI 26 36 kg/m²     Physical Exam  Vitals signs reviewed  Constitutional:       General: He is not in acute distress  Appearance: He is ill-appearing (frail)  He is not diaphoretic  HENT:      Head: Normocephalic and atraumatic  Eyes:      General: Lids are normal  No scleral icterus  Conjunctiva/sclera: Conjunctivae normal       Pupils: Pupils are equal, round, and reactive to light  Neck:      Musculoskeletal: Normal range of motion and neck supple  Thyroid: No thyromegaly  Cardiovascular:      Rate and Rhythm: Normal rate and regular rhythm  Heart sounds: Normal heart sounds  No murmur  Pulmonary:      Effort: Pulmonary effort is normal  No respiratory distress  Breath sounds: Normal breath sounds  Abdominal:      General: There is no distension  Palpations: Abdomen is soft  There is hepatomegaly  There is no splenomegaly  Tenderness: There is no abdominal tenderness     Genitourinary:     Comments: Haile catheter draining clear yellow urine with sediment noted in the tubing  Musculoskeletal: Normal range of motion  General: No swelling  Lymphadenopathy:      Cervical: No cervical adenopathy  Upper Body:      Right upper body: No axillary adenopathy  Left upper body: No axillary adenopathy  Skin:     General: Skin is warm and dry  Findings: No erythema or rash  Neurological:      General: No focal deficit present  Mental Status: He is alert and oriented to person, place, and time  Psychiatric:         Mood and Affect: Mood is depressed  Affect is flat  Behavior: Behavior normal  Behavior is cooperative  Thought Content: Thought content normal          Judgment: Judgment normal            Labs:  Lab Results   Component Value Date    WBC 9 23 01/08/2021    HGB 10 4 (L) 01/08/2021    HCT 31 9 (L) 01/08/2021    MCV 93 01/08/2021     01/08/2021     Lab Results   Component Value Date    K 3 9 01/08/2021     01/08/2021    CO2 25 01/08/2021    BUN 54 (H) 01/08/2021    CREATININE 4 98 (H) 01/08/2021    GLUF 84 01/08/2021    CALCIUM 8 8 01/08/2021    CORRECTEDCA 9 4 01/08/2021    AST 11 01/08/2021    ALT 27 01/08/2021    ALKPHOS 127 (H) 01/08/2021    EGFR 10 01/08/2021       Patient voiced understanding and agreement in the above discussion  Aware to contact our office with questions/symptoms in the interim  This note has been generated by voice recognition software system  Therefore, there may be spelling, grammar, and or syntax errors  Please contact if questions arise

## 2021-01-20 PROBLEM — N18.9 CKD (CHRONIC KIDNEY DISEASE): Status: RESOLVED | Noted: 2020-01-01 | Resolved: 2021-01-01

## 2021-01-20 NOTE — ASSESSMENT & PLAN NOTE
Lab Results   Component Value Date    EGFR 10 01/08/2021    EGFR 12 12/16/2020    EGFR 11 12/15/2020    CREATININE 4 98 (H) 01/08/2021    CREATININE 4 38 (H) 12/16/2020    CREATININE 4 48 (H) 12/15/2020       Estimated GFR is about stable, around 10 mL/minute  At this time, the patient does not wish to proceed with hemodialysis  Or any other form of renal replacement therapy  If this changes in the future, I asked him to give us a call immediately so we can start the process of vascular access  Will continue to honor his wishes and manage all that can be managed medically at this time  Patient may need to be referred to hospice when he becomes uremic

## 2021-01-20 NOTE — ASSESSMENT & PLAN NOTE
Lab Results   Component Value Date    EGFR 10 01/08/2021    EGFR 12 12/16/2020    EGFR 11 12/15/2020    CREATININE 4 98 (H) 01/08/2021    CREATININE 4 38 (H) 12/16/2020    CREATININE 4 48 (H) 12/15/2020       Blood pressure controlled, continue current medications

## 2021-01-20 NOTE — PROGRESS NOTES
[facility], [address] [place] [address]    Name: Anton Das  YOB: 1940      Assessment/Plan:    CKD (chronic kidney disease) stage 5, GFR less than 15 ml/min [facility])  Lab Results   Component Value Date    EGFR 10 01/08/2021    EGFR 12 12/16/2020    EGFR 11 12/15/2020    CREATININE 4 98 (H) 01/08/2021    CREATININE 4 38 (H) 12/16/2020    CREATININE 4 48 (H) 12/15/2020       Estimated GFR is about stable, around 10 mL/minute  At this time, the patient does not wish to proceed with hemodialysis  Or any other form of renal replacement therapy  If this changes in the future, I asked him to give us a call immediately so we can start the process of vascular access  Will continue to honor his wishes and manage all that can be managed medically at this time  Patient may need to be referred to hospice when he becomes uremic  Hypertensive kidney disease with stage 5 chronic kidney disease, not on chronic dialysis [facility])  Lab Results   Component Value Date    EGFR 10 01/08/2021    EGFR 12 12/16/2020    EGFR 11 12/15/2020    CREATININE 4 98 (H) 01/08/2021    CREATININE 4 38 (H) 12/16/2020    CREATININE 4 48 (H) 12/15/2020       Blood pressure controlled, continue current medications  Recurrent UTI    Patient continues to do well without daily prophylaxis, as reminder, patient has resistant bacteria and will require for urine cultures to be done as well as reviewed prior to initiation of medications  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 [facility])    Possible mild improvement noted with therapy, continue according to Hematology/Oncology  Problem List Items Addressed This Visit        Digestive    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 ([facility] ) (Chronic)       Possible mild improvement noted with therapy, continue according to Hematology/Oncology              Endocrine    Type 2 diabetes mellitus with hyperglycemia, with long-term current use of insulin ([facility] [address] )    Relevant Medications    insulin detemir (Levemir) 100 units/mL subcutaneous injection    Carcinoid syndrome ([facility] )       Genitourinary    Recurrent UTI       Patient continues to do well without daily prophylaxis, as reminder, patient has resistant bacteria and will require for urine cultures to be done as well as reviewed prior to initiation of medications  Hypertensive kidney disease with stage 5 chronic kidney disease, not on chronic dialysis [facility])     Lab Results   Component Value Date    EGFR 10 01/08/2021    EGFR 12 12/16/2020    EGFR 11 12/15/2020    CREATININE 4 98 (H) 01/08/2021    CREATININE 4 38 (H) 12/16/2020    CREATININE 4 48 (H) 12/15/2020       Blood pressure controlled, continue current medications  CKD (chronic kidney disease) stage 5, GFR less than 15 ml/min [facility]) - Primary     Lab Results   Component Value Date    EGFR 10 01/08/2021    EGFR 12 12/16/2020    EGFR 11 12/15/2020    CREATININE 4 98 (H) 01/08/2021    CREATININE 4 38 (H) 12/16/2020    CREATININE 4 48 (H) 12/15/2020       Estimated GFR is about stable, around 10 mL/minute  At this time, the patient does not wish to proceed with hemodialysis  Or any other form of renal replacement therapy  If this changes in the future, I asked him to give us a call immediately so we can start the process of vascular access  Will continue to honor his wishes and manage all that can be managed medically at this time  Patient may need to be referred to hospice when he becomes uremic           Relevant Orders    CBC and differential    Comprehensive metabolic panel    Magnesium    Phosphorus       Other    Urinary catheter in place (Chronic)    Edema      Other Visit Diagnoses     Acute hypokalemia  (Chronic)       Relevant Medications    magnesium oxide (MAG-OX) 400 mg    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        Relevant Medications    colchicine (COLCRYS) 0 6 mg tablet    Secondary hyperparathyroidism of renal origin ([facility] [address] )        Relevant Orders    PTH, intact            Patient is stable at this time  We will see him back in 2 months for regular appointment  Patient will inform us if he changes mind and wishes to pursue renal replacement therapy, at this time he declines  If and when it is appropriate, the patient will need to be transitioned to hospice to help manage symptoms of kidney failure  Blood work will need to be done prior to his next appointment  Subjective:      Patient ID: Anderson Cevallos is a 79 y o  male  Patient presents for follow up regarding CKD  In general, patient is stable he is complaining anorexia and having to force himself to eat food  He is enjoying vanilla ice cream       Patient is status post cataract removal and lens placement, unfortunately was some complications during the procedure  Eye site is intact, he continues to have follow-up with Ophthalmology  Hypertension  This is a chronic problem  The current episode started more than 1 year ago  The problem is unchanged  The problem is controlled  Pertinent negatives include no chest pain or orthopnea  There are no associated agents to hypertension  Risk factors for coronary artery disease include male gender  Past treatments include calcium channel blockers  There are no compliance problems  Hypertensive end-organ damage includes kidney disease  Identifiable causes of hypertension include chronic renal disease  The following portions of the patient's history were reviewed and updated as appropriate: allergies, current medications, past family history, past medical history, past social history, past surgical history and problem list     Review of Systems   Cardiovascular: Negative for chest pain and orthopnea  All other systems reviewed and are negative  Social History     Socioeconomic History    Marital status:       Spouse name: Not on file    Number of children: Not on file    Years of education: Not on file    Highest education level: Not on file   Occupational History    Not on file   Social Needs    Financial resource strain: Not on file    Food insecurity     Worry: Not on file     Inability: Not on file    Transportation needs     Medical: Not on file     Non-medical: Not on file   Tobacco Use    Smoking status: Never Smoker    Smokeless tobacco: Never Used   Substance and Sexual Activity    Alcohol use: Not Currently     Alcohol/week: 0 0 standard drinks     Frequency: Never    Drug use: Never    Sexual activity: Not Currently   Lifestyle    Physical activity     Days per week: Not on file     Minutes per session: Not on file    Stress: Not on file   Relationships    Social connections     Talks on phone: Not on file     Gets together: Not on file     Attends [employer] service: Not on file     Active member of club or organization: Not on file     Attends meetings of clubs or organizations: Not on file     Relationship status: Not on file    Intimate partner violence     Fear of current or ex partner: Not on file     Emotionally abused: Not on file     Physically abused: Not on file     Forced sexual activity: Not on file   Other Topics Concern    Not on file   Social History Narrative    Not on file     Past Medical History:   Diagnosis Date    Acute pancreatitis without infection or necrosis 9/26/2019    Age-related nuclear cataract, right 11/5/2020    Anemia of chronic renal failure     BPH (benign prostatic hyperplasia)     Chronic kidney disease (CKD), stage IV (severe)     Coronary artery disease     DJD (degenerative joint disease)     Essential hypertension     Gait disturbance     Generalized weakness     GERD (gastroesophageal reflux disease)     Gout     History of transfusion     Hydronephrosis     Hyperlipidemia     Hypertension     Liver cancer     Pressure injury of skin     Proteinuria     Renal disorder     Retention of urine     Thrombophlebitis migrans     Type 2 diabetes mellitus      Past Surgical History:   Procedure Laterality Date    CATARACT EXTRACTION Right 11/05/2020    CATARACT EXTRACTION W/ INTRAOCULAR LENS IMPLANT Right 11/5/2020    Procedure: EYE OD CATARACT EXTRACTION WITH IOL INSERTION;  Surgeon: Justin Suárez MD;  Location: [facility] MAIN OR;  Service: Ophthalmology    CHOLECYSTECTOMY     [address] [place] [address] N/A 2/7/2020    Procedure: CHOLECYSTECTOMY LAPAROSCOPIC;  Surgeon:  Hallie Samaniego MD;  [facility] MA[facility] OR;  Service: General    CORONARY ANGIOPLASTY WITH STENT PLACEMENT      FL RETROGRADE PYELOGRAM  5/21/2020    IR BIOPSY LIVER MASS  1/24/2019    IR CHOLECYSTOSTOMY TUBE PLACEMENT  9/6/2019    RI CYSTO/URETERO W/LITHOTRIPSY &INDWELL STENT INSRT Left 5/21/2020    Procedure: CYSTOSCOPY URETEROSCOPY WITH LITHOTRIPSY HOLMIUM LASER, RETROGRADE PYELOGRAM AND INSERTION STENT URETERAL;  Surgeon: Esteban Cevallos MD;  Location: [facility] MAIN OR;  Service: Urology    RI CYSTOURETHROSCOPY,URETER CATHETER Left 4/21/2020    Procedure: CYSTOSCOPY WITH INSERTION STENT URETERAL;  Surgeon: Tyra Oreilly MD;  Location: [facility] Main [facility];  Service: Urology       Current Outpatient Medications:     acetaminophen (TYLENOL) 325 mg tablet, Take 650 mg by mouth every 6 (six) hours as needed , Disp: , Rfl:     aspirin 81 MG tablet, Take 81 mg by mouth daily, Disp: , Rfl:     B Complex-C-Folic Acid (TRIPHROCAPS) 1 MG CAPS, Take 1 capsule (1 mg total) by mouth daily with dinner, Disp: 30 each, Rfl: 5    BD INSULIN SYRINGE U/F 30G X 1/2" 0 3 ML MISC, , Disp: , Rfl:     cholecalciferol (VITAMIN D3) 1,000 units tablet, Take 1,000 Units by mouth daily Take 2, Disp: , Rfl:     colchicine (COLCRYS) 0 6 mg tablet, Take 1 tablet (0 6 mg total) by mouth daily, Disp: , Rfl:     famotidine (PEPCID) 10 mg tablet, Take 1 tablet (10 mg total) by mouth daily at bedtime, Disp: 30 tablet, Rfl: 0    furosemide (LASIX) 40 mg tablet, Take 1 tablet (40 mg total) by mouth daily , do NOT take if you do not have swelling, Disp: 30 tablet, Rfl: 5    insulin detemir (Levemir) 100 units/mL subcutaneous injection, Inject 18 Units under the skin daily at bedtime, Disp: , Rfl:     insulin lispro (HumaLOG) 100 units/mL injection, Inject 4 Units under the skin 3 (three) times a day before meals, Disp: , Rfl: 0    Insulin Syringe-Needle U-100 30G X 1/2" 1 ML MISC, by Does not apply route 3 (three) times a day, Disp: 100 each, Rfl: 5    NIFEdipine ER (ADALAT CC) 60 MG 24 hr tablet, Take 1 tablet (60 mg total) by mouth daily, Disp: 30 tablet, Rfl: 3    ofloxacin (OCUFLOX) 0 3 % ophthalmic solution, Administer to the right eye 4 (four) times a day , Disp: , Rfl:     pantoprazole (PROTONIX) 40 mg tablet, Take 1 tablet (40 mg total) by mouth daily, Disp: 90 tablet, Rfl: 1    prednisoLONE acetate (PRED FORTE) 1 % ophthalmic suspension, 4 (four) times a day , Disp: , Rfl:     sodium bicarbonate 650 mg tablet, Take 325 mg by mouth 2 (two) times a day , Disp: , Rfl:     sucralfate (CARAFATE) 1 g/10 mL suspension, Take 10 mL (1,000 mg total) by mouth every 6 (six) hours, Disp: 420 mL, Rfl: 0    tamsulosin (FLOMAX) 0 4 mg, Take 1 capsule (0 4 mg total) by mouth daily with dinner, Disp: 90 capsule, Rfl: 3    vitamin B-12 (VITAMIN B-12) 1,000 mcg tablet, Take 100 mcg by mouth daily , Disp: , Rfl:     magnesium oxide (MAG-OX) 400 mg, Take 0 5 tablets (200 mg total) by mouth 2 (two) times a day, Disp: , Rfl:     meclizine (ANTIVERT) 25 mg tablet, Take 1 tablet (25 mg total) by mouth every 8 (eight) hours as needed for dizziness, Disp: 90 tablet, Rfl: 0    Lab Results   Component Value Date    SODIUM 140 01/08/2021    K 3 9 01/08/2021     01/08/2021    CO2 25 01/08/2021    AGAP 8 01/08/2021    BUN 54 (H) 01/08/2021    CREATININE 4 98 (H) 01/08/2021    GLUC 42 (L) 12/16/2020    GLUF 84 01/08/2021    CALCIUM 8 8 01/08/2021    AST 11 01/08/2021 ALT 27 01/08/2021    ALKPHOS 127 (H) 01/08/2021    TP 6 8 01/08/2021    TBILI 0 69 01/08/2021    EGFR 10 01/08/2021     Lab Results   Component Value Date    WBC 9 23 01/08/2021    HGB 10 4 (L) 01/08/2021    HCT 31 9 (L) 01/08/2021    MCV 93 01/08/2021     01/08/2021     Lab Results   Component Value Date    CHOLESTEROL 135 08/06/2020    CHOLESTEROL 164 07/16/2018    CHOLESTEROL 188 06/24/2017     Lab Results   Component Value Date    HDL 50 08/06/2020     Lab Results   Component Value Date    LDLCALC 78 08/06/2020     Lab Results   Component Value Date    TRIG 35 08/06/2020     No results found for: [place], [place]  Lab Results   Component Value Date    BFG7ZSXYQXZQ 3 471 09/30/2020     Lab Results   Component Value Date     3 (H) 01/08/2021    CALCIUM 8 8 01/08/2021    PHOS 4 9 (H) 01/08/2021     Lab Results   Component Value Date    SPEP  03/12/2020     No monoclonal bands noted  Reviewed by: Tl Andujar MD  ([id number])  **Electronic Signature**    UPEP  03/12/2020     The UPEP shows non-selective proteinuria  No monoclonal bands noted  Reviewed by: Tl Andujar MD ([id number])  **Electronic Signature**     No results found for: NOEMI [id number]        Objective:      /84   Pulse (!) 106   Ht 5' 7" (1 702 m)   Wt 72 1 kg (159 lb)   SpO2 99%   BMI 24 90 kg/m²          Physical Exam  Vitals signs reviewed  Constitutional:       General: He is not in acute distress  Appearance: He is well-developed  HENT:      Head: Normocephalic and atraumatic  Eyes:      Conjunctiva/sclera: Conjunctivae normal    Neck:      Musculoskeletal: Neck supple  Cardiovascular:      Rate and Rhythm: Normal rate and regular rhythm  Pulmonary:      Effort: Pulmonary effort is normal       Breath sounds: Normal breath sounds  Abdominal:      Palpations: Abdomen is soft  Musculoskeletal:      Right lower leg: Edema present  Left lower leg: Edema present  Skin:     General: Skin is warm  Findings: No rash  Neurological:      Mental Status: He is alert and oriented to person, place, and time  Cranial Nerves: No cranial nerve deficit  Motor: Weakness present        Gait: Gait abnormal    Psychiatric:         Behavior: Behavior normal

## 2021-01-20 NOTE — ASSESSMENT & PLAN NOTE
Patient continues to do well without daily prophylaxis, as reminder, patient has resistant bacteria and will require for urine cultures to be done as well as reviewed prior to initiation of medications

## 2021-01-22 NOTE — TELEPHONE ENCOUNTER
rodger Fabian called from home health patient o2 has been running around 92  Patient denies any symptoms such as headache and dizzy ness   Patient is going to monitor 02 and if it drops below 90 and or if patient begins with any symptoms he was directed to go to urgent care and or the er

## 2021-01-24 PROBLEM — N17.9 ACUTE KIDNEY INJURY DUE TO COVID-19 (HCC): Status: ACTIVE | Noted: 2021-01-01

## 2021-01-24 PROBLEM — N18.5 ACUTE RENAL FAILURE SUPERIMPOSED ON STAGE 5 CHRONIC KIDNEY DISEASE, NOT ON CHRONIC DIALYSIS (HCC): Status: ACTIVE | Noted: 2021-01-01

## 2021-01-24 PROBLEM — U07.1 ACUTE KIDNEY INJURY DUE TO COVID-19 (HCC): Status: ACTIVE | Noted: 2021-01-01

## 2021-01-24 PROBLEM — N17.9 ACUTE RENAL FAILURE SUPERIMPOSED ON STAGE 5 CHRONIC KIDNEY DISEASE, NOT ON CHRONIC DIALYSIS (HCC): Status: ACTIVE | Noted: 2021-01-01

## 2021-01-24 NOTE — ED PROVIDER NOTES
History  Chief Complaint   Patient presents with    Weakness - Generalized      62-year-old male presents emergency department accompanied by his wife for concern for generalized weakness decreased p o  intake  Patient has a history of liver cancer for which he is undergoing treatment and follows with Heme-Onc regularly  Patient is accompanied by his wife  He was seen approximately 2 weeks ago high as oncologist and gets chemotherapy once a month  Patient does not participate with history and physical exam   History is provided by the patient's wife  She states the patient is not normally as active as he usually is and is currently not interactive  Allergies reviewed          Prior to Admission Medications   Prescriptions Last Dose Informant Patient Reported? Taking?    B Complex-C-Folic Acid (TRIPHROCAPS) 1 MG CAPS  Spouse/Significant Other No No   Sig: Take 1 capsule (1 mg total) by mouth daily with dinner   NIFEdipine ER (ADALAT CC) 60 MG 24 hr tablet  Spouse/Significant Other No No   Sig: Take 1 tablet (60 mg total) by mouth daily   acetaminophen (TYLENOL) 325 mg tablet  Spouse/Significant Other Yes No   Sig: Take 650 mg by mouth every 6 (six) hours as needed    aspirin 81 MG tablet  Spouse/Significant Other Yes No   Sig: Take 81 mg by mouth daily   cholecalciferol (VITAMIN D3) 1,000 units tablet  Spouse/Significant Other Yes No   Sig: Take 1,000 Units by mouth daily Take 2   colchicine (COLCRYS) 0 6 mg tablet   No No   Sig: Take 1 tablet (0 6 mg total) by mouth daily   Patient taking differently: Take 0 3 mg by mouth daily    famotidine (PEPCID) 10 mg tablet  Spouse/Significant Other No No   Sig: Take 1 tablet (10 mg total) by mouth daily at bedtime   furosemide (LASIX) 40 mg tablet  Spouse/Significant Other No No   Sig: Take 1 tablet (40 mg total) by mouth daily , do NOT take if you do not have swelling   insulin detemir (LEVEMIR) 100 units/mL subcutaneous injection   Yes Yes   Sig: Inject 5 Units under the skin daily at bedtime   magnesium oxide (MAG-OX) 400 mg   No No   Sig: Take 0 5 tablets (200 mg total) by mouth 2 (two) times a day   pantoprazole (PROTONIX) 40 mg tablet  Spouse/Significant Other No No   Sig: Take 1 tablet (40 mg total) by mouth daily   sodium bicarbonate 650 mg tablet  Spouse/Significant Other Yes No   Sig: Take 325 mg by mouth 2 (two) times a day    sucralfate (CARAFATE) 1 g/10 mL suspension  Spouse/Significant Other No No   Sig: Take 10 mL (1,000 mg total) by mouth every 6 (six) hours   tamsulosin (FLOMAX) 0 4 mg  Spouse/Significant Other No No   Sig: Take 1 capsule (0 4 mg total) by mouth daily with dinner   vitamin B-12 (VITAMIN B-12) 1,000 mcg tablet  Spouse/Significant Other Yes No   Sig: Take 100 mcg by mouth daily       Facility-Administered Medications: None       Past Medical History:   Diagnosis Date    Acute pancreatitis without infection or necrosis 9/26/2019    Age-related nuclear cataract, right 11/5/2020    Anemia of chronic renal failure     BPH (benign prostatic hyperplasia)     CKD (chronic kidney disease) stage 5, GFR less than 15 ml/min (HCC)     Coronary artery disease     DJD (degenerative joint disease)     Essential hypertension     Gait disturbance     Generalized weakness     GERD (gastroesophageal reflux disease)     Gout     History of transfusion     Hydronephrosis     Hyperlipidemia     Hypertension     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)     Pressure injury of skin     Proteinuria     Thrombophlebitis migrans     Type 2 diabetes mellitus        Past Surgical History:   Procedure Laterality Date    CATARACT EXTRACTION Right 11/05/2020    CATARACT EXTRACTION W/ INTRAOCULAR LENS IMPLANT Right 11/5/2020    Procedure: EYE OD CATARACT EXTRACTION WITH IOL INSERTION;  Surgeon: Shantel Bermudez MD;  Location: [facility] MAIN OR;  Service: Ophthalmology    CHOLECYSTECTOMY      CHOLECYSTECTOMY LAPAROSCOPIC N/A 2/7/2020    Procedure: CHOLECYSTECTOMY LAPAROSCOPIC;  Surgeon: Eri Aguila MD;  Location: [address] MAIN OR;  Service: General    CORONARY ANGIOPLASTY WITH STENT PLACEMENT      FL RETROGRADE PYELOGRAM  5/21/2020    IR BIOPSY LIVER MASS  1/24/2019    IR CHOLECYSTOSTOMY TUBE PLACEMENT  9/6/2019    ID CYSTO/URETERO W/LITHOTRIPSY &INDWELL STENT INSRT Left 5/21/2020    Procedure: CYSTOSCOPY URETEROSCOPY WITH LITHOTRIPSY HOLMIUM LASER, RETROGRADE PYELOGRAM AND INSERTION STENT URETERAL;  Surgeon: Dipesh Hastings MD;  Location: [facility] MAIN OR;  Service: Urology    ID CYSTOURETHROSCOPY,URETER CATHETER Left 4/21/2020    Procedure: CYSTOSCOPY WITH INSERTION STENT URETERAL;  Surgeon: Dinorah Ortiz MD;  Location[facility] Main [facility];  Service: Urology       Family History   Problem Relation Age of Onset    Cancer Mother     Hypertension Father     Cancer Brother     Cancer Maternal Grandmother     Cancer Paternal Aunt      I have reviewed and agree with the history as documented  E-Cigarette/Vaping    E-Cigarette Use Never User      E-Cigarette/Vaping Substances     Social History     Tobacco Use    Smoking status: Never Smoker    Smokeless tobacco: Never Used   Substance Use Topics    Alcohol use: Not Currently     Alcohol/week: 0 0 standard drinks     Frequency: Never    Drug use: Never       Review of Systems   Unable to perform ROS: Mental status change       Physical Exam  Physical Exam  Vitals signs and nursing note reviewed  Constitutional:       General: He is awake  He is not in acute distress  Appearance: He is well-developed and well-groomed  He is ill-appearing  HENT:      Head: Normocephalic and atraumatic  Right Ear: External ear normal       Left Ear: External ear normal       Nose: Nose normal    Eyes:      Pupils: Pupils are equal, round, and reactive to light  Neck:      Musculoskeletal: Normal range of motion and neck supple  Cardiovascular:      Rate and Rhythm: Normal rate and regular rhythm        Heart sounds: Normal heart sounds  No murmur  No friction rub  No gallop  Pulmonary:      Effort: Pulmonary effort is normal  No respiratory distress  Breath sounds: Normal breath sounds  No stridor  No wheezing or rales  Abdominal:      General: Bowel sounds are normal  There is no distension  Palpations: Abdomen is soft  Tenderness: There is no abdominal tenderness  There is guarding  Musculoskeletal: Normal range of motion  General: No tenderness  Skin:     General: Skin is warm and dry  Capillary Refill: Capillary refill takes less than 2 seconds  Neurological:      Mental Status: He is oriented to person, place, and time           Vital Signs  ED Triage Vitals [01/24/21 1631]   Temperature Pulse Respirations Blood Pressure SpO2   97 6 °F (36 4 °C) 82 18 138/82 95 %      Temp Source Heart Rate Source Patient Position - Orthostatic VS BP Location FiO2 (%)   Temporal Monitor Lying Left arm --      Pain Score       No Pain           Vitals:    01/24/21 1631 01/24/21 1845 01/24/21 1930 01/24/21 2000   BP: 138/82 164/73 144/72 152/67   Pulse: 82 73 74 69   Patient Position - Orthostatic VS: Lying            Visual Acuity      ED Medications  Medications   aspirin (ECOTRIN LOW STRENGTH) EC tablet 81 mg (has no administration in time range)   b complex-vitamin C-folic acid (NEPHROCAPS) capsule 1 capsule (has no administration in time range)   colchicine (COLCRYS) tablet 0 3 mg (has no administration in time range)   insulin detemir (LEVEMIR) subcutaneous injection 5 Units (has no administration in time range)   magnesium oxide (MAG-OX) tablet 200 mg (has no administration in time range)   NIFEdipine (PROCARDIA XL) 24 hr tablet 60 mg (has no administration in time range)   sodium bicarbonate tablet 325 mg (has no administration in time range)   tamsulosin (FLOMAX) capsule 0 4 mg (has no administration in time range)   cyanocobalamin (VITAMIN B-12) tablet 100 mcg (has no administration in time range)   sodium chloride 0 9 % infusion (has no administration in time range)   heparin (porcine) subcutaneous injection 5,000 Units (has no administration in time range)   insulin lispro (HumaLOG) 100 units/mL subcutaneous injection 1-6 Units (has no administration in time range)   sodium chloride 0 9 % bolus 1,000 mL (1,000 mL Intravenous New Bag 1/24/21 1838)       Diagnostic Studies  Results Reviewed     Procedure Component Value Units Date/Time    COVID19, Influenza A/B, RSV PCR, [facility] [[id number]]  (Abnormal) Collected: 01/24/21 1800    Lab Status: Final result Specimen: Nares from Nasopharyngeal Swab Updated: 01/24/21 1909     SARS-CoV-2 Positive     INFLUENZA A PCR Negative     INFLUENZA B PCR Negative     RSV PCR Negative    Narrative: This test has been authorized by FDA under an EUA (Emergency Use Assay) for use by authorized laboratories  Clinical caution and judgement should be used with the interpretation of these results with consideration of the clinical impression and other laboratory testing  Testing reported as "Positive" or "Negative" has been proven to be accurate according to standard laboratory validation requirements  All testing is performed with control materials showing appropriate reactivity at standard intervals      Troponin I [[id number]]  (Abnormal) Collected: 01/24/21 1800    Lab Status: Final result Specimen: Blood from Arm, Left Updated: 01/24/21 1831     Troponin I 0 04 ng/mL     Magnesium [[id number]]  (Abnormal) Collected: 01/24/21 1800    Lab Status: Final result Specimen: Blood from Arm, Left Updated: 01/24/21 1830     Magnesium 1 7 mg/dL     Lipase [[id number]]  (Abnormal) Collected: 01/24/21 1800    Lab Status: Final result Specimen: Blood from Arm, Left Updated: 01/24/21 1830     Lipase 104 u/L     Comprehensive metabolic panel [[id number]]  (Abnormal) Collected: 01/24/21 1800    Lab Status: Final result Specimen: Blood from Arm, Left Updated: 01/24/21 1830 Sodium 139 mmol/L      Potassium 3 6 mmol/L      Chloride 100 mmol/L      CO2 25 mmol/L      ANION GAP 14 mmol/L      BUN 53 mg/dL      Creatinine 6 34 mg/dL      Glucose 195 mg/dL      Calcium 8 2 mg/dL      AST 14 U/L      ALT 8 U/L      Alkaline Phosphatase 77 U/L      Total Protein 6 4 g/dL      Albumin 3 6 g/dL      Total Bilirubin 0 50 mg/dL      eGFR 8 ml/min/1 73sq m     Narrative:      [place] guidelines for Chronic Kidney Disease (CKD):     Stage 1 with normal or high GFR (GFR > 90 mL/min/1 73 square meters)    Stage 2 Mild CKD (GFR = 60-89 mL/min/1 73 square meters)    Stage 3A Moderate CKD (GFR = 45-59 mL/min/1 73 square meters)    Stage 3B Moderate CKD (GFR = 30-44 mL/min/1 73 square meters)    Stage 4 Severe CKD (GFR = 15-29 mL/min/1 73 square meters)    Stage 5 End Stage CKD (GFR <15 mL/min/1 73 square meters)  Note: GFR calculation is accurate only with a steady state creatinine    Lactic acid [[id number]]  (Normal) Collected: 01/24/21 1800    Lab Status: Final result Specimen: Blood from Arm, Left Updated: 01/24/21 1828     LACTIC ACID 0 8 mmol/L     Narrative:      Result may be elevated if tourniquet was used during collection      Protime-INR [[id number]]  (Normal) Collected: 01/24/21 1800    Lab Status: Final result Specimen: Blood from Arm, Left Updated: 01/24/21 1827     Protime 13 9 seconds      INR 1 08    APTT [[id number]]  (Abnormal) Collected: 01/24/21 1800    Lab Status: Final result Specimen: Blood from Arm, Left Updated: 01/24/21 1827     PTT 39 seconds     CBC and differential [[id number]]  (Abnormal) Collected: 01/24/21 1800    Lab Status: Final result Specimen: Blood from Arm, Left Updated: 01/24/21 1812     WBC 4 20 Thousand/uL      RBC 3 15 Million/uL      Hemoglobin 9 5 g/dL      Hematocrit 28 4 %      MCV 90 fL      MCH 30 2 pg      MCHC 33 5 g/dL      RDW 13 9 %      MPV 8 6 fL      Platelets 230 Thousands/uL      Neutrophils Relative 70 % Lymphocytes Relative 17 %      Monocytes Relative 13 %      Eosinophils Relative 0 %      Basophils Relative 0 %      Neutrophils Absolute 2 90 Thousands/µL      Lymphocytes Absolute 0 70 Thousands/µL      Monocytes Absolute 0 50 Thousand/µL      Eosinophils Absolute 0 00 Thousand/µL      Basophils Absolute 0 00 Thousands/µL     Fingerstick Glucose (POCT) [[id number]]  (Abnormal) Collected: 01/24/21 1808    Lab Status: Final result Updated: 01/24/21 1809     POC Glucose 215 mg/dl     UA w Reflex to Microscopic w Reflex to Culture [[id number]]     Lab Status: No result Specimen: Urine                  CT chest abdomen pelvis wo contrast   Final Result by Destini Garza MD (01/24 1839)         1  Bilateral groundglass opacities in a peripheral distribution concerning for multifocal Covid-19 pneumonia  2   Bilateral pleural effusions, right greater than left  3   Trace abdominal ascites with stable omental implants inserted for carcinomatosis  4   Stable partially calcified mesenteric mass  Workstation performed: [id number]         CT head without contrast   Final Result by Barbara Arndt MD (01/24 7704)      No acute intracranial hemorrhage, mass effect or edema  Microangiopathic changes  Workstation performed: [id number]                    Procedures  Procedures         ED Course  ED Course as of Jan 24 2025   [place] Jan 24, 2021   1840 Creatinine(!): 6 34   1840 BUN(!): 53   1840 Troponin I(!): 0 04                                           MDM  Number of Diagnoses or Management Options  SILVERIO (acute kidney injury) [facility]):   Generalized weakness:   Metastatic cancer [facility]):   Diagnosis management comments:  COVID positive SILVERIO generalized weakness  Patient to be admitted for further further treatment evaluation    Patient is in agreement with       Disposition  Final diagnoses:   SILVERIO (acute kidney injury) ([facility] )   Metastatic cancer ([facility] )   Generalized weakness   COVID-19     Time reflects when diagnosis was documented in both MDM as applicable and the Disposition within this note     Time User Action Codes Description Comment    1/24/2021  6:53 PM Prudence Norse Add [N17 9] SILVERIO (acute kidney injury) ([facility] )     1/24/2021  6:53 PM Prudence Norse Add [C79 9] Metastatic cancer ([facility] )     1/24/2021  6:53 PM Prudence Norse Add [R53 1] Generalized weakness     1/24/2021  8:25 PM Prudence Norse Add [U07 1] COVID-19       ED Disposition     ED Disposition Condition Date/Time Comment    Admit Stable Sun Jan 24, 2021  6:52 PM Case was discussed with Dr Tram Agustin and the patient's admission status was agreed to be Admission Status: inpatient status to the service of Dr Tram Agustin   Follow-up Information    None         Patient's Medications   Discharge Prescriptions    No medications on file     No discharge procedures on file      PDMP Review       Value Time User    PDMP Reviewed  Yes 3/7/2020 12:10 PM Lilly Whatley          ED Provider  Electronically Signed by           Mone Carey PA-C  01/24/21 2025

## 2021-01-25 NOTE — UTILIZATION REVIEW
Initial Clinical Review    Admission: Date/Time/Statement:   Admission Orders (From admission, onward)     Ordered        01/24/21 1854  Inpatient Admission  Once                   Orders Placed This Encounter   Procedures    Inpatient Admission     Standing Status:   Standing     Number of Occurrences:   1     Order Specific Question:   Level of Care     Answer:   Med Surg [16]     Order Specific Question:   Estimated length of stay     Answer:   More than 2 Midnights     Order Specific Question:   Certification     Answer:   I certify that inpatient services are medically necessary for this patient for a duration of greater than two midnights  See H&P and MD Progress Notes for additional information about the patient's course of treatment  ED Arrival Information     Expected Arrival Acuity Means of Arrival Escorted By Service Admission Type    - 1/24/2021 16:27 Urgent Wheelchair Friend Hospitalist Urgent    Arrival Complaint    generalized weakness,fatigue        Chief Complaint   Patient presents with    Weakness - Generalized     Assessment/Plan:   79 yom to er from home c/o decreased po intake, generalized weakness  Hx metastatic neuroendocrine tumor, diabetes mellitus, CAD; on active monthly chemo tx  Presents not interactive, with slowed speech, r pupil more dilated than L, decreased breath sounds, pale & weak  Admission work-up showing COVID+, silverio, hypomagnesemia, +troponin, elevated d-dimer, ferritin & CRP levels, multifocal pneumonia  Admitted to inpatient status for SILVERIO [address]nd [address]  Started on IVF, COVID med/tx pathway       ED Triage Vitals [01/24/21 1631]   Temperature Pulse Respirations Blood Pressure SpO2   97 6 °F (36 4 °C) 82 18 138/82 95 %      Temp Source Heart Rate Source Patient Position - Orthostatic VS BP Location FiO2 (%)   Temporal Monitor Lying Left arm --      Pain Score       No Pain          Wt Readings from Last 1 Encounters:   01/24/21 70 1 kg (154 lb 8 7 oz)     Additional Vital Signs:   Date/Time  Temp  Pulse  Resp  BP  MAP (mmHg)  SpO2  O2 Device  Patient Position - Orthostatic VS   01/25/21 0813  98 4 °F (36 9 °C)  75  24Abnormal   148/80  --  95 %  None (Room air)  Lying   01/24/21 2051  98 3 °F (36 8 °C)  76  18  144/72  94  94 %  None (Room air)  Lying   01/24/21 2000  --  69  18  152/67  96  97 %  --  --   01/24/21 1930  --  74  21  144/72  100  97 %  --  --   01/24/21 1845  --  73  16  164/73  105  95 %  --  --   01/24/21 1631  97 6 °F (36 4 °C)  82  18  138/82  --  95 %  None (Room air)  Lying     Pertinent Labs/Diagnostic Test Results:   Results from last 7 days   Lab Units 01/24/21  1800   SARS-COV-2  Positive*     Results from last 7 days   Lab Units 01/25/21  0449 01/24/21  1800   WBC Thousand/uL 4 20* 4 20*   HEMOGLOBIN g/dL 9 2* 9 5*   HEMATOCRIT % 28 1* 28 4*   PLATELETS Thousands/uL 121* 124*   NEUTROS ABS Thousands/µL  --  2 90     Results from last 7 days   Lab Units 01/25/21  0449 01/24/21  1800   SODIUM mmol/L 142 139   POTASSIUM mmol/L 3 3* 3 6   CHLORIDE mmol/L 106 100   CO2 mmol/L 26 25   ANION GAP mmol/L 10 14*   BUN mg/dL 52* 53*   CREATININE mg/dL 6 27* 6 34*   EGFR ml/min/1 73sq m 8 8   CALCIUM mg/dL 7 9* 8 2*   MAGNESIUM mg/dL  --  1 7*     Results from last 7 days   Lab Units 01/25/21  0449 01/24/21  1800   AST U/L 16 14   ALT U/L 8 8   ALK PHOS U/L 69 77   TOTAL PROTEIN g/dL 5 6* 6 4   ALBUMIN g/dL 3 1* 3 6   TOTAL BILIRUBIN mg/dL 0 40 0 50     Results from last 7 days   Lab Units 01/25/21  1145 01/25/21  0507 01/24/21  2046 01/24/21  1808   POC GLUCOSE mg/dl 55* 89 163* 215*     Results from last 7 days   Lab Units 01/25/21  0449 01/24/21  1800   GLUCOSE RANDOM mg/dL 96 195*     BETA-HYDROXYBUTYRATE   Date Value Ref Range Status   02/05/2019 0 11 0 02 - 0 27 mmol/L Final      Results from last 7 days   Lab Units 01/24/21  1800   TROPONIN I ng/mL 0 04*     Results from last 7 days   Lab Units 01/25/21  0449   D-DIMER QUANTITATIVE ug/ml FEU 3 85*     Results from last 7 days   Lab Units 01/24/21  1800   PROTIME seconds 13 9   INR  1 08   PTT seconds 39*     Results from last 7 days   Lab Units 01/25/21  0449   PROCALCITONIN ng/ml 0 20     Results from last 7 days   Lab Units 01/24/21  1800   LACTIC ACID mmol/L 0 8     Results from last 7 days   Lab Units 01/25/21  0449   FERRITIN ng/mL 951*     Results from last 7 days   Lab Units 01/24/21  1800   LIPASE u/L 104*     Results from last 7 days   Lab Units 01/25/21  0449   CRP mg/L 122 3*     Results from last 7 days   Lab Units 01/25/21  0537   CLARITY UA  Slightly Cloudy*   COLOR UA  Yellow   SPEC GRAV UA  1 010   PH UA  7 5   GLUCOSE UA mg/dl Negative   KETONES UA mg/dl Negative   BLOOD UA  Trace-lysed*   PROTEIN UA mg/dl 1+*   NITRITE UA  Negative   BILIRUBIN UA  Negative   UROBILINOGEN UA E U /dl 0 2   LEUKOCYTES UA  2+*   WBC UA /hpf 30-50*   RBC UA /hpf 2-4   BACTERIA UA /hpf Moderate*   EPITHELIAL CELLS WET PREP /hpf Occasional     Results from last 7 days   Lab Units 01/24/21  1800   INFLUENZA A PCR  Negative   INFLUENZA B PCR  Negative   RSV PCR  Negative     1/24  CT HEAD=  No acute intracranial hemorrhage, mass effect or edema  Microangiopathic changes  CT CHEST, A/P=  1  Bilateral groundglass opacities in a peripheral distribution concerning for multifocal Covid-19 pneumonia  2   Bilateral pleural effusions, right greater than left  3   Trace abdominal ascites with stable omental implants inserted for carcinomatosis  4   Stable partially calcified mesenteric mass    EKG=   Normal sinus rhythm  Left axis deviation  Nonspecific ST-t wave changes    ED Treatment:   Medication Administration from 01/24/2021 1627 to 01/24/2021 2028       Date/Time Order Dose Route Action Action by Comments     01/24/2021 1838 sodium chloride 0 9 % bolus 1,000 mL 1,000 mL Intravenous New Bag Christine Martinez RN         Past Medical History:   Diagnosis Date    Acute pancreatitis without infection or necrosis 9/26/2019    Age-related nuclear cataract, right 11/5/2020    Anemia of chronic renal failure     BPH (benign prostatic hyperplasia)     CKD (chronic kidney disease) stage 5, GFR less than 15 ml/min (HCC)     Coronary artery disease     DJD (degenerative joint disease)     Essential hypertension     Gait disturbance     Generalized weakness     GERD (gastroesophageal reflux disease)     Gout     History of transfusion     Hydronephrosis     Hyperlipidemia     Hypertension     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)    Admitting Diagnosis: Metastatic cancer (HCC) [C79 9]  Weakness [R53 1]  [facility]IL[facility]IO [facility] [N17 9]  Generalized weakness [R53 1]  COVID-19 [U07 1]  Age/Sex: 79 y o  male  Admission Orders:  accuchecks with coverage scale  Consult renal  Pt/ot eval & tx  Contact & airborne isolation    Scheduled Medications:  ascorbic acid, 1,000 mg, Oral, Q12H [address]  aspirin, 81 mg, Oral, Daily  b complex-vitamin C-folic acid, 1 capsule, Oral, Daily With Dinner  cholecalciferol, 2,000 Units, Oral, Daily  colchicine, 0 3 mg, Oral, Daily  vitamin B-12, 100 mcg, Oral, Daily  famotidine, 20 mg, Intravenous, Q24H NATACHA  heparin (porcine), 5,000 Units, Subcutaneous, Q8H [address]  insulin detemir, 5 Units, Subcutaneous, HS  insulin lispro, 1-6 Units, Subcutaneous, 4x Daily (AC & HS)  magnesium oxide, 200 mg, Oral, BID  zinc sulfate, 220 mg, Oral, Daily    Followed by  Shani Lucas ON 2/1/2021] multivitamin-minerals, 1 tablet, Oral, Daily  NIFEdipine, 60 mg, Oral, Daily  remdesivir, 100 mg, Intravenous, Q24H  sodium bicarbonate, 325 mg, Oral, BID  tamsulosin, 0 4 mg, Oral, Daily With Dinner    Continuous IV Infusions:  sodium chloride, 75 mL/hr, Intravenous, Continuous    PRN Meds:  acetaminophen, 650 mg, Oral, Q6H PRN  HYDROmorphone, 0 2 mg, Intravenous, Q4H PRN  ondansetron, 4 mg, Intravenous, Q4H PRN    Network Utilization Review Department  ATTENTION: Please call with any questions or concerns to [phone] and carefully listen to the prompts so that you are directed to the right person  All voicemails are confidential   Georgiana Jimenez all requests for admission clinical reviews, approved or denied determinations and any other requests to dedicated fax number below belonging to the campus where the patient is receiving treatment   List of dedicated fax numbers for the Facilities:  [address] DENIALS (Administrative/Medical Necessity) [phone]   [address] (Maternity/NICU/Pediatrics) [phone] [address] [place] [address] Dr Forde [facility] Jamie Squires 4505 ([facility]) [address] [place] [address] Terry Munoz [address] [place] [address] [facility] [phone]

## 2021-01-25 NOTE — PLAN OF CARE
Problem: Prexisting or High Potential for Compromised Skin Integrity  Goal: Skin integrity is maintained or improved  Description: INTERVENTIONS:  - Identify patients at risk for skin breakdown  - Assess and monitor skin integrity  - Assess and monitor nutrition and hydration status  - Monitor labs   - Assess for incontinence   - Turn and reposition patient  - Assist with mobility/ambulation  - Relieve pressure over bony prominences  - Avoid friction and shearing  - Provide appropriate hygiene as needed including keeping skin clean and dry  - Evaluate need for skin moisturizer/barrier cream  - Collaborate with interdisciplinary team   - Patient/family teaching  - Consider wound care consult   Outcome: Progressing     Problem: PAIN - ADULT  Goal: Verbalizes/displays adequate comfort level or baseline comfort level  Description: Interventions:  - Encourage patient to monitor pain and request assistance  - Assess pain using appropriate pain scale  - Administer analgesics based on type and severity of pain and evaluate response  - Implement non-pharmacological measures as appropriate and evaluate response  - Consider cultural and social influences on pain and pain management  - Notify physician/advanced practitioner if interventions unsuccessful or patient reports new pain  Outcome: Progressing     Problem: SAFETY ADULT  Goal: Patient will remain free of falls  Description: INTERVENTIONS:  - Assess patient frequently for physical needs  -  Identify cognitive and physical deficits and behaviors that affect risk of falls    -  [place] fall precautions as indicated by assessment   - Educate patient/family on patient safety including physical limitations  - Instruct patient to call for assistance with activity based on assessment  - Modify environment to reduce risk of injury  - Consider OT/PT consult to assist with strengthening/mobility  Outcome: Progressing  Goal: Maintain or return to baseline ADL function  Description: INTERVENTIONS:  -  Assess patient's ability to carry out ADLs; assess patient's baseline for ADL function and identify physical deficits which impact ability to perform ADLs (bathing, care of mouth/teeth, toileting, grooming, dressing, etc )  - Assess/evaluate cause of self-care deficits   - Assess range of motion  - Assess patient's mobility; develop plan if impaired  - Assess patient's need for assistive devices and provide as appropriate  - Encourage maximum independence but intervene and supervise when necessary  - Involve family in performance of ADLs  - Assess for home care needs following discharge   - Consider OT consult to assist with ADL evaluation and planning for discharge  - Provide patient education as appropriate  Outcome: Progressing  Goal: Maintain or return mobility status to optimal level  Description: INTERVENTIONS:  - Assess patient's baseline mobility status (ambulation, transfers, stairs, etc )    - Identify cognitive and physical deficits and behaviors that affect mobility  - Identify mobility aids required to assist with transfers and/or ambulation (gait belt, sit-to-stand, lift, walker, cane, etc )  - [place] fall precautions as indicated by assessment  - Record patient progress and toleration of activity level on Mobility SBAR; progress patient to next Phase/Stage  - Instruct patient to call for assistance with activity based on assessment  - Consider rehabilitation consult to assist with strengthening/weightbearing, etc   Outcome: Progressing     Problem: DISCHARGE PLANNING  Goal: Discharge to home or other facility with appropriate resources  Description: INTERVENTIONS:  - Identify barriers to discharge w/patient and caregiver  - Arrange for needed discharge resources and transportation as appropriate  - Identify discharge learning needs (meds, wound care, etc )  - Arrange for interpretive services to assist at discharge as needed  - Refer to Case Management Department for coordinating discharge planning if the patient needs post-hospital services based on physician/advanced practitioner order or complex needs related to functional status, cognitive ability, or social support system  Outcome: Progressing     Problem: Knowledge Deficit  Goal: Patient/family/caregiver demonstrates understanding of disease process, treatment plan, medications, and discharge instructions  Description: Complete learning assessment and assess knowledge base    Interventions:  - Provide teaching at level of understanding  - Provide teaching via preferred learning methods  Outcome: Progressing     Problem: RESPIRATORY - ADULT  Goal: Achieves optimal ventilation and oxygenation  Description: INTERVENTIONS:  - Assess for changes in respiratory status  - Assess for changes in mentation and behavior  - Position to facilitate oxygenation and minimize respiratory effort  - Oxygen administered by appropriate delivery if ordered  - Initiate smoking cessation education as indicated  - Encourage broncho-pulmonary hygiene including cough, deep breathe, Incentive Spirometry  - Assess the need for suctioning and aspirate as needed  - Assess and instruct to report SOB or any respiratory difficulty  - Respiratory Therapy support as indicated  Outcome: Progressing     Problem: GENITOURINARY - ADULT  Goal: Maintains or returns to baseline urinary function  Description: INTERVENTIONS:  - Assess urinary function  - Encourage oral fluids to ensure adequate hydration if ordered  - Administer IV fluids as ordered to ensure adequate hydration  - Administer ordered medications as needed  - Offer frequent toileting  - Follow urinary retention protocol if ordered  Outcome: Progressing  Goal: Urinary catheter remains patent  Description: INTERVENTIONS:  - Assess patency of urinary catheter  - If patient has a chronic garcia, consider changing catheter if non-functioning  - Follow guidelines for intermittent irrigation of non-functioning urinary catheter  Outcome: Progressing

## 2021-01-25 NOTE — NURSING NOTE
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

## 2021-01-25 NOTE — ASSESSMENT & PLAN NOTE
· Patient was hypoglycemic at lunch time today with fingerstick of 55  · Patient was given orange juice and ate his lunch, fingerstick improved to 129  · Will hold Levemir for now  · Continue insulin sliding scale  · Monitor for hypoglycemia

## 2021-01-25 NOTE — PROGRESS NOTES
Progress Note - Analisa Davidson 1940, 79 y o  male MRN: [id number]    Unit/Bed#: -01 Encounter: [id number]    Primary Care Provider: RIKA Roberts   Date and time admitted to hospital: 1/24/2021  4:30 PM        * COVID-19 virus infection  Assessment & Plan  · Will continue treatment under mild algorithm, patient currently not requiring oxygen  · Continue remdesivir day 2  · Patient was not started on dexamethasone or plasma therapy given that he is not requiring oxygen at this time  · Will continue to monitor inflammatory markers  · Will start ceftriaxone 1 g daily  · If procalcitonin remains normal will discontinue antibiotics  · Continue supportive care    Acute renal failure superimposed on stage 5 chronic kidney disease, not on chronic dialysis [facility])  Assessment & Plan  · Likely exacerbated by COVID infection  Baseline creatinine appears to be around 4-5  · Will continue gentle hydration  · Lasix held for now  · Nephrology consulted  · Will monitor urine output  · Avoid any nephrotoxic meds    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 [facility])  Assessment & Plan  · 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  · Follows with Dr Shen Singh, on monthly injectable    Essential hypertension  Assessment & Plan  · BP stable  · Continue nifedipine    Type 2 diabetes mellitus with hyperglycemia, with long-term current use of insulin ([facility] [address] )  Assessment & Plan  · Patient was hypoglycemic at lunch time today with fingerstick of 55  · Patient was given orange juice and ate his lunch, fingerstick improved to 129  · Will hold Levemir for now  · Continue insulin sliding scale  · Monitor for hypoglycemia      VTE Prophylaxis:  Heparin    Patient Centered Rounds: I have performed bedside rounds with nursing staff today      Discussions with Specialists or Other Care Team Provider: yes  Education and Discussions with Family / Patient:  Spoke with patient's significant other over the phone regarding plan of care    Current Length of Stay: 1 day(s)    Current Patient Status: Inpatient   Certification Statement: The patient will continue to require additional inpatient hospital stay due to COVID infection    Discharge Plan:  Pending hospital course    Code Status: Level 1 - Full Code    Subjective:   No overnight events noted  Patient tolerating diet per nurse  Currently not requiring oxygen    Objective:     Vitals:   Temp (24hrs), Av 1 °F (36 7 °C), Min:97 6 °F (36 4 °C), Max:98 4 °F (36 9 °C)    Temp:  [97 6 °F (36 4 °C)-98 4 °F (36 9 °C)] 98 4 °F (36 9 °C)  HR:  [69-82] 75  Resp:  [16-24] 24  BP: (138-164)/(67-82) 148/80  SpO2:  [94 %-97 %] 95 %  Body mass index is 23 5 kg/m²  Input and Output Summary (last 24 hours): Intake/Output Summary (Last 24 hours) at 2021 1328  Last data filed at 2021 0500  Gross per 24 hour   Intake 1000 ml   Output 350 ml   Net 650 ml       Physical Exam:   Physical Exam  Constitutional:       General: He is not in acute distress  Comments: Frail elderly male   HENT:      Head: Normocephalic and atraumatic  Nose: Nose normal       Mouth/Throat:      Mouth: Mucous membranes are dry  Eyes:      Extraocular Movements: Extraocular movements intact  Conjunctiva/sclera: Conjunctivae normal    Neck:      Musculoskeletal: Normal range of motion and neck supple  Cardiovascular:      Rate and Rhythm: Normal rate and regular rhythm  Pulmonary:      Effort: Pulmonary effort is normal  No respiratory distress  Abdominal:      Palpations: Abdomen is soft  Tenderness: There is no abdominal tenderness  Musculoskeletal:      Comments: Generalized weakness   Skin:     General: Skin is warm and dry  Neurological:      General: No focal deficit present  Cranial Nerves: No cranial nerve deficit     Psychiatric:         Mood and Affect: Mood normal          Behavior: Behavior normal          Additional Data:     Labs:    Results from last 7 days   Lab Units 01/25/21  0449 01/24/21  1800   WBC Thousand/uL 4 20* 4 20*   HEMOGLOBIN g/dL 9 2* 9 5*   HEMATOCRIT % 28 1* 28 4*   PLATELETS Thousands/uL 121* 124*   NEUTROS PCT %  --  70   LYMPHS PCT %  --  17*   MONOS PCT %  --  13*   EOS PCT %  --  0     Results from last 7 days   Lab Units 01/25/21  0449   SODIUM mmol/L 142   POTASSIUM mmol/L 3 3*   CHLORIDE mmol/L 106   CO2 mmol/L 26   BUN mg/dL 52*   CREATININE mg/dL 6 27*   CALCIUM mg/dL 7 9*   ALK PHOS U/L 69   ALT U/L 8   AST U/L 16     Results from last 7 days   Lab Units 01/24/21  1800   INR  1 08     Results from last 7 days   Lab Units 01/25/21  1227 01/25/21  1145 01/25/21  0507 01/24/21  2046 01/24/21  1808   POC GLUCOSE mg/dl 129 55* 89 163* 215*           * I Have Reviewed All Lab Data Listed Above  * Additional Pertinent Lab Tests Reviewed:  Laura [facility] Admission  Reviewed    Imaging:  Imaging Reports Reviewed Today Include:  No new imaging    Recent Cultures (last 7 days):           Last 24 Hours Medication List:   Current Facility-Administered Medications   Medication Dose Route Frequency Provider Last Rate    acetaminophen  650 mg Oral Q6H PRN Sampson Sanchez DO      ascorbic acid  1,000 mg Oral Q12H [address] Ady Suggs DO      aspirin  81 mg Oral Daily Ady Suggs DO      b complex-vitamin C-folic acid  1 capsule Oral Daily With [employer], DO      cefTRIAXone  1,000 mg Intravenous Q24H Eri Oneil MD      cholecalciferol  2,000 Units Oral Daily Sampson Sanchez, DO      colchicine  0 3 mg Oral Daily Sampson Sanchez DO      vitamin B-12  100 mcg Oral Daily Ady Suggs DO      famotidine  20 mg Intravenous Q24H [address] Ady Suggs DO      heparin (porcine)  5,000 Units Subcutaneous Q8H [address] Ady Suggs DO      HYDROmorphone  0 2 mg Intravenous Q4H PRN Sampson Sanchez DO      insulin lispro  1-6 Units Subcutaneous 4x Daily (AC & HS) Ady Ifeanyi, DO      magnesium oxide  200 mg Oral BID Sampson Sanchez, DO      zinc sulfate  220 mg Oral Daily Ady Suggs, DO      Followed by   Silvino Sousa ON 2/1/2021] multivitamin-minerals  1 tablet Oral Daily Ady Suggs, DO      NIFEdipine  60 mg Oral Daily Ady Suggs, DO      ondansetron  4 mg Intravenous Q4H PRN Felicie Renetta, DO      remdesivir  100 mg Intravenous Q24H Ady Ifeanyi, DO      sodium bicarbonate  325 mg Oral BID Felicie Renetta, DO      sodium chloride  75 mL/hr Intravenous Continuous Ady Suggs, DO 75 mL/hr (01/25/21 0413)    tamsulosin  0 4 mg Oral Daily With [employer], DO      vancomycin  125 mg Oral Q12H Moisés Angel MD          Today, Patient Was Seen By: Josefa Bermudez MD    ** Please Note: Dictation voice to text software may have been used in the creation of this document   **

## 2021-01-25 NOTE — UTILIZATION REVIEW
Notification of Inpatient Admission/Inpatient Authorization Request   This is a Notification of Inpatient Admission for [address]  Be advised that this patient was admitted to our facility under Inpatient Status  Contact Araceli Her at [phone] for additional admission information  Fred Leonardo  DEPT  DEDICATED [phone]  Patient Name:   Juancho Paredes   YOB: 1940       [address]   [address]:   [address]  Tax ID: [id number]  NPI: [id number] Attending Provider/NPI:  Phone:  Address: Eri Oneil, Brandy Jules [[id number]]  [phone]  Same as FOUZIA/Fiona Lucas 1106 of Service Code: 24 Place of Service Name: [address] [place] [address]   Start Date: 1/24/21 1854 Discharge Date & Time: No discharge date for patient encounter  Type of Admission: Inpatient Status Discharge Disposition   (if discharged): Home with [address] [facility]   Patient Diagnoses: Metastatic cancer ([facility] [address] ) [C79 9]  Weakness [R53 1]  SILVERIO (acute kidney injury) ([facility] [address] ) [N17 9]  Generalized weakness [R53 1]  COVID-19 [U07 1]     Orders: Admission Orders (From admission, onward)     Ordered        01/24/21 1854  Inpatient Admission  Once                    Assigned Utilization Review Contact: [address] [place] [address] Utilization Review Department  Phone: [phone]; Fax [phone]  Email: [email or web address]   ATTENTION PAYERS: Please call the assigned Utilization  directly with any questions or concerns ALL voicemails in the department are confidential  Send all requests for admission clinical reviews, approved or denied determinations and any other requests to dedicated fax number belonging to the campus where the patient is receiving treatment

## 2021-01-25 NOTE — CONSULTS
Consultation - Nephrology   Junior Canas 79 y o  male MRN: [id number]  Unit/Bed#: -01 Encounter: [id number]      A/P:  1  Acute kidney injury on top of chronic kidney disease   Most likely due to prerenal causes, will check urine studies to rule out a acute tubular necrosis  Continue to maintain Haile catheter  2  Chronic kidney disease stage 5 not on dialysis   Patient's baseline creatinine appears to be around 4 5 mg/dL, would continue to monitor this, as mentioned in previous office notes, patient does not wish to initiate dialysis even if this means saving his life  He has the option of changing his decision regarding this matter, and if he does he will let us know  In the meantime patient will need to be medically managed  3  Hypertensive nephrosclerosis  4  Hypokalemia   Patient given 2 doses of 20 mEq of oral potassium chloride  5  Recurrent urinary tract infections   Patient tends to have highly resistant bacteria, he was on methenamine hippurate for some time, however for the last 6 months or so he has been without this medication and he has done well with no recurrent urinary tract infections  6  Urinary retention   Patient has a chronic Haile catheter  7  COVID-19   Patient is currently on remdesivir, will need to continue monitor the patient clinically as well as check lab parameters  If the patient begins to experience LFT abnormalities, he may need to have short-term hemodialysis  Will discuss this matter further if when indicated  8  Diabetes mellitus type 2  9  Metastatic neuroendocrine carcinoma        Thank you for allowing us to participate in the care of your patient  Please feel free to contact us regarding the care of this patient, or any other questions/concerns that may be applicable      Patient Active Problem List   Diagnosis    Type 2 diabetes mellitus with hyperglycemia, with long-term current use of insulin ([address] )    Essential hypertension    Mixed hyperlipidemia    Iron deficiency anemia secondary to inadequate dietary iron intake    Syncope and collapse    Liver mass    Neuroendocrine tumor    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)    Pressure injury of right heel, unstageable (HCC)    Atherosclerosis of artery of extremity with ulceration (HCC)    Carcinoid syndrome (HCC)    Urinary catheter in place    Malignant neuroendocrine neoplasm ([address] )    Abdominal pain    UTI due to extended-spectrum beta lactamase (ESBL) producing Escherichia coli    Hypomagnesemia    Normocytic anemia    Goals of care, counseling/discussion    Hyperparathyroid bone disease (HCC)    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    Sepsis (HCC)    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    Low TSH level    Severe protein-calorie malnutrition (HCC)    Chronic Diastolic congestive heart failure    Premature atrial complexes    Bradycardia    Generalized weakness    Recurrent UTI    Gastroesophageal reflux disease without esophagitis    Ureterolithiasis    Obstructive uropathy    Gait difficulty    CAD (coronary artery disease)    Azotemia    Acidosis    Hypertensive kidney disease with stage 5 chronic kidney disease, not on chronic dialysis (HCC)    Edema    Chest pain    Chest pressure    Urinary retention    Elevated d-dimer    Esophageal dysphagia    Dizziness    Anemia due to chronic kidney disease    CKD (chronic kidney disease) stage 5, GFR less than 15 ml/min (HCC)    Syncope    SIRS (systemic inflammatory response syndrome) (HCC)    Acute renal failure superimposed on stage 5 chronic kidney disease, not on chronic dialysis (HCC)    Acute kidney injury due to COVID-19 [facility])       History of Present Illness   Physician Requesting Consult: Taisha Giang MD  Reason for Consult / Principal Problem:  Acute kidney injury  Hx and PE limited by:   HPI: Obinna Jordan is a 79y o  year old male who presented to the emergency room on January 24th with complaints of worsening generalized weakness  Patient began to experience weakness on Thursday, January 21st and continued to worsen as the days progressed  Patient was eventually test positive for COVID upon presentation to the hospital   Of note we saw the patient in our office the day prior, Wednesday January 20th  At that time the patient claims to feel weaker than usual, but had no other overt symptoms or complaints such as fevers/chills or any GI issues  Upon presentation, the patient was noted to have a creatinine of 6 34 mg/dL, this morning is essentially unchanged at 6 27 mg/dL  There are no significant electrolyte abnormalities other than a mild hypokalemia of 3 3 millimole/L  Patient denies use of nonsteroidal anti-inflammatory medications at home  He had been using furosemide, but does not believe he had taking any in the last 2 days  Previous electronic medical records reviewed during the course this consultation  History obtained from chart review and the patient    Review of Systems - Negative except as mentioned above in HPI, more specifics as mentioned below    Review of Systems - General ROS: positive for  - fatigue  Psychological ROS: negative  Ophthalmic ROS: negative  ENT ROS: negative  Allergy and Immunology ROS: negative  Hematological and Lymphatic ROS: negative  Endocrine ROS: negative  Respiratory ROS: positive for - shortness of breath  Cardiovascular ROS: negative  Gastrointestinal ROS: no abdominal pain, change in bowel habits, or black or bloody stools  Genito-Urinary ROS: negative  Musculoskeletal ROS: positive for - muscular weakness  Neurological ROS: no TIA or stroke symptoms  Dermatological ROS: negative    Historical Information   Past Medical History:   Diagnosis Date    Acute pancreatitis without infection or necrosis 9/26/2019    Age-related nuclear cataract, right 11/5/2020    Anemia of chronic renal failure     BPH (benign prostatic hyperplasia)     CKD (chronic kidney disease) stage 5, GFR less than 15 ml/min (HCC)     Coronary artery disease     DJD (degenerative joint disease)     Essential hypertension     Gait disturbance     Generalized weakness     GERD (gastroesophageal reflux disease)     Gout     History of transfusion     Hydronephrosis     Hyperlipidemia     Hypertension     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)     Pressure injury of skin     Proteinuria     Thrombophlebitis migrans     Type 2 diabetes mellitus      Past Surgical History:   Procedure Laterality Date    CATARACT EXTRACTION Right 11/05/2020    CATARACT EXTRACTION W/ INTRAOCULAR LENS IMPLANT Right 11/5/2020    Procedure: EYE OD CATARACT EXTRACTION WITH IOL INSERTION;  Surgeon: Mirella Spear MD;  Location: [facility] MAIN OR;  Service: Ophthalmology    CHOLECYSTECTOMY     [address] [place] [address] N/A 2/7/2020    Procedure: CHOLECYSTECTOMY LAPAROSCOPIC;  Surgeon:  Finn Stevenson MD;  Location: [facility] MAIN OR;  Service: General    CORONARY ANGIOPLASTY WITH STENT PLACEMENT      FL RETROGRADE PYELOGRAM  5/21/2020    IR BIOPSY LIVER MASS  1/24/2019    IR CHOLECYSTOSTOMY TUBE PLACEMENT  9/6/2019    ND CYSTO/URETERO W/LITHOTRIPSY &INDWELL STENT INSRT Left 5/21/2020    Procedure: CYSTOSCOPY URETEROSCOPY WITH LITHOTRIPSY HOLMIUM LASER, RETROGRADE PYELOGRAM AND INSERTION STENT URETERAL;  Surgeon: Ravindra Benson MD;  Location: [facility] MAIN OR;  Service: Urology    ND CYSTOURETHROSCOPY,URETER CATHETER Left 4/21/2020    Procedure: CYSTOSCOPY WITH INSERTION STENT URETERAL;  Surgeon: Mio Gutierrez MD;  Location: [facility] Main [facility];  Service: Urology     Social History   Social History     Substance and Sexual Activity   Alcohol Use Never    Alcohol/week: 0 0 standard drinks    Frequency: Never     Social History     Substance and Sexual Activity   Drug Use Never     Social History     Tobacco Use   Smoking Status Never Smoker   Smokeless Tobacco Never Used     Family History Problem Relation Age of Onset    Cancer Mother     Hypertension Father     Cancer Brother     Cancer Maternal Grandmother     Cancer Paternal Aunt        Meds/Allergies   all current active meds have been reviewed, current meds:   Current Facility-Administered Medications   Medication Dose Route Frequency    acetaminophen (TYLENOL) tablet 650 mg  650 mg Oral Q6H PRN    ascorbic acid (VITAMIN C) tablet 1,000 mg  1,000 mg Oral Q12H [facility]    aspirin (ECOTRIN LOW STRENGTH) EC tablet 81 mg  81 mg Oral Daily    b complex-vitamin C-folic acid (NEPHROCAPS) capsule 1 capsule  1 capsule Oral Daily With Dinner    cholecalciferol (VITAMIN D3) tablet 2,000 Units  2,000 Units Oral Daily    colchicine (COLCRYS) tablet 0 3 mg  0 3 mg Oral Daily    cyanocobalamin (VITAMIN B-12) tablet 100 mcg  100 mcg Oral Daily    famotidine (PEPCID) injection 20 mg  20 mg Intravenous Q24H NATACHA    heparin (porcine) subcutaneous injection 5,000 Units  5,000 Units Subcutaneous Q8H [facility]    HYDROmorphone (DILAUDID) injection 0 2 mg  0 2 mg Intravenous Q4H PRN    insulin detemir (LEVEMIR) subcutaneous injection 5 Units  5 Units Subcutaneous HS    insulin lispro (HumaLOG) 100 units/mL subcutaneous injection 1-6 Units  1-6 Units Subcutaneous 4x Daily (AC & HS)    magnesium oxide (MAG-OX) tablet 200 mg  200 mg Oral BID    zinc sulfate (ZINCATE) capsule 220 mg  220 mg Oral Daily    Followed by   Brock Burroughs ON 2/1/2021] multivitamin-minerals (CENTRUM) tablet 1 tablet  1 tablet Oral Daily    NIFEdipine (PROCARDIA XL) 24 hr tablet 60 mg  60 mg Oral Daily    ondansetron (ZOFRAN) injection 4 mg  4 mg Intravenous Q4H PRN    potassium chloride (K-DUR,KLOR-CON) CR tablet 20 mEq  20 mEq Oral Q3H    remdesivir (Veklury) 100 mg in sodium chloride 0 9 % 250 mL IVPB  100 mg Intravenous Q24H    sodium bicarbonate tablet 325 mg  325 mg Oral BID    sodium chloride 0 9 % infusion  75 mL/hr Intravenous Continuous    tamsulosin (FLOMAX) capsule 0 4 mg 0 4 mg Oral Daily With Dinner    and PTA meds:    Medications Prior to Admission   Medication    acetaminophen (TYLENOL) 325 mg tablet    insulin detemir (LEVEMIR) 100 units/mL subcutaneous injection    aspirin 81 MG tablet    B Complex-C-Folic Acid (TRIPHROCAPS) 1 MG CAPS    cholecalciferol (VITAMIN D3) 1,000 units tablet    colchicine (COLCRYS) 0 6 mg tablet    famotidine (PEPCID) 10 mg tablet    furosemide (LASIX) 40 mg tablet    magnesium oxide (MAG-OX) 400 mg    NIFEdipine ER (ADALAT CC) 60 MG 24 hr tablet    pantoprazole (PROTONIX) 40 mg tablet    sodium bicarbonate 650 mg tablet    sucralfate (CARAFATE) 1 g/10 mL suspension    tamsulosin (FLOMAX) 0 4 mg    vitamin B-12 (VITAMIN B-12) 1,000 mcg tablet         No Known Allergies    Objective     Intake/Output Summary (Last 24 hours) at 1/25/2021 0858  Last data filed at 1/25/2021 0500  Gross per 24 hour   Intake 1000 ml   Output 350 ml   Net 650 ml       Invasive Devices:   Urethral Catheter Double-lumen 16 Fr  (Active)       Physical Exam      I/O last 3 completed shifts: In: 1000 [IV Piggyback:1000]  Out: 350 [Urine:350]    Vitals:    01/25/21 0813   BP: 148/80   Pulse: 75   Resp: (!) 24   Temp: 98 4 °F (36 9 °C)   SpO2: 95%       Gen: in NAD, lethargic but awakes to verbal stimuli  HEENT: no sclerous icterus, MMM, neck supple  CV: +S1/S2, RRR  Lungs: CTA bilaterally  Abd: +BS, soft NT/ND  Ext: all four extremities are warm  Skin: no rashes noted  Neuro: CN II-XII intact    Current Weight: Weight - Scale: 70 1 kg (154 lb 8 7 oz)  First Weight: Weight - Scale: 72 1 kg (159 lb)    Lab Results:  I have personally reviewed pertinent labs      CBC:   Lab Results   Component Value Date    WBC 4 20 (L) 01/25/2021    HGB 9 2 (L) 01/25/2021    HCT 28 1 (L) 01/25/2021    MCV 91 01/25/2021     (L) 01/25/2021    MCH 29 9 01/25/2021    MCHC 32 8 01/25/2021    RDW 13 8 01/25/2021    MPV 8 7 01/25/2021     CMP:   Lab Results   Component Value Date    K 3 3 (L) 01/25/2021     01/25/2021    CO2 26 01/25/2021    BUN 52 (H) 01/25/2021    CREATININE 6 27 (H) 01/25/2021    CALCIUM 7 9 (L) 01/25/2021    AST 16 01/25/2021    ALT 8 01/25/2021    ALKPHOS 69 01/25/2021    EGFR 8 01/25/2021     Phosphorus: No results found for: PHOS  Magnesium:   Lab Results   Component Value Date    MG 1 7 (L) 01/24/2021     Urinalysis:   Lab Results   Component Value Date    COLORU Yellow 01/25/2021    CLARITYU Slightly Cloudy (A) 01/25/2021    SPECGRAV 1 010 01/25/2021    PHUR 7 5 01/25/2021    LEUKOCYTESUR 2+ (A) 01/25/2021    NITRITE Negative 01/25/2021    GLUCOSEU Negative 01/25/2021    KETONESU Negative 01/25/2021    BILIRUBINUR Negative 01/25/2021    BLOODU Trace-lysed (A) 01/25/2021     Ionized Calcium: No results found for: CAION  Coagulation:   Lab Results   Component Value Date    INR 1 08 01/24/2021     Troponin:   Lab Results   Component Value Date    TROPONINI 0 04 (H) 01/24/2021     ABG: No results found for: PHART, VTA2ANC, PO2ART, MKB8QFH, A5SVSALD, BEART, SOURCE    Results from last 7 days   Lab Units 01/25/21  0449 01/24/21  1800   POTASSIUM mmol/L 3 3* 3 6   CHLORIDE mmol/L 106 100   CO2 mmol/L 26 25   BUN mg/dL 52* 53*   CREATININE mg/dL 6 27* 6 34*   CALCIUM mg/dL 7 9* 8 2*   ALK PHOS U/L 69 77   ALT U/L 8 8   AST U/L 16 14       Radiology review:  Procedure: Ct Chest Abdomen Pelvis Wo Contrast    Result Date: 1/24/2021  Narrative: CT CHEST, ABDOMEN AND PELVIS WITHOUT IV CONTRAST INDICATION:   Progressive weakness fatigue history of liver cancer  Abdominal guarding on exam  Patient has suspected COVID-19  COMPARISON:  CT abdomen pelvis 12/14/2020, 9/30/2020 TECHNIQUE: CT examination of the chest, abdomen and pelvis was performed without intravenous contrast   Axial, sagittal, and coronal 2D reformatted images were created from the source data and submitted for interpretation  Radiation dose length product (DLP) for this visit:  625 2 mGy-cm     This examination, like all CT scans performed in the [facility], was performed utilizing techniques to minimize radiation dose exposure, including the use of iterative reconstruction and automated exposure control  Enteric contrast was not administered  FINDINGS: CHEST LUNGS:  Scattered predominantly peripheral groundglass opacities now identified  In the setting of clinically suspected/proven COVID-19, the above lung parenchymal findings on CT indicate high confidence level for COVID-19  Bibasilar compressive atelectasis  No tracheal or endobronchial lesions  PLEURA:  Bilateral pleural effusions, right greater than left  HEART/GREAT VESSELS:  Stable fusiform dilatation of the ascending thoracic aorta measures 4 5 cm  Calcified native coronary vessels  MEDIASTINUM AND NEAL:  Unremarkable  CHEST WALL AND LOWER NECK:   Unremarkable  ABDOMEN LIVER/BILIARY TREE:  Unremarkable  Previously described hypodensities inconspicuous on this nonenhanced study  No biliary ductal dilatation  GALLBLADDER:  Gallbladder is surgically absent  SPLEEN:  Unremarkable  PANCREAS:  Unremarkable  ADRENAL GLANDS:  Unremarkable  KIDNEYS/URETERS:  Unremarkable  No hydronephrosis  STOMACH AND BOWEL:  Unremarkable  APPENDIX:  No findings to suggest appendicitis  ABDOMINOPELVIC CAVITY:  Partially calcified mesenteric mass in the mid abdomen unchanged  No pneumoperitoneum or abdominal pelvic lymphadenopathy  Trace upper abdominal ascites  Scattered soft tissue implants throughout the omentum unchanged best seen  on series 2, image 86 consistent with carcinomatosis  VESSELS:  Atherosclerotic changes are present  No evidence of aneurysm  PELVIS REPRODUCTIVE ORGANS:  The prostate is enlarged  URINARY BLADDER:  Collapsed catheterized bladder with no significant perivesical inflammation  ABDOMINAL WALL/INGUINAL REGIONS:  Unremarkable  OSSEOUS STRUCTURES:  No acute fracture or destructive osseous lesion  Impression: 1    Bilateral groundglass opacities in a peripheral distribution concerning for multifocal Covid-19 pneumonia  2   Bilateral pleural effusions, right greater than left  3   Trace abdominal ascites with stable omental implants inserted for carcinomatosis  4   Stable partially calcified mesenteric mass  Workstation performed: [id number]     Procedure: Ct Head Without Contrast    Result Date: 1/24/2021  Narrative: CT BRAIN - WITHOUT CONTRAST INDICATION:   Change in mental status  Weakness - Generalized  hx of liver ca w/ mets Patient has suspected COVID-19  COMPARISON:  12/14/2020; 9/21/2020 TECHNIQUE:  CT examination of the brain was performed  In addition to axial images, sagittal and coronal 2D reformatted images were created and submitted for interpretation  Radiation dose length product (DLP) for this visit:  734 8 mGy-cm   This examination, like all CT scans performed in the [facility], was performed utilizing techniques to minimize radiation dose exposure, including the use of iterative reconstruction and automated exposure control  IMAGE QUALITY:  Diagnostic  FINDINGS: PARENCHYMA: Decreased attenuation is noted in periventricular and subcortical white matter demonstrating an appearance that is statistically most likely to represent mild microangiopathic change  Atherosclerotic calcifications noted  No CT signs of acute infarction  No intracranial mass, mass effect or midline shift  No acute parenchymal hemorrhage  VENTRICLES AND EXTRA-AXIAL SPACES:  Normal for the patient's age  VISUALIZED ORBITS AND PARANASAL SINUSES:  Unremarkable  CALVARIUM AND EXTRACRANIAL SOFT TISSUES:  Normal      Impression: No acute intracranial hemorrhage, mass effect or edema  Microangiopathic changes  Workstation performed: [id number]         Layo Crandall,       This consultation note was produced in part using a dictation device which may document imprecise wording from author's original intent

## 2021-01-25 NOTE — PLAN OF CARE
Problem: OCCUPATIONAL THERAPY ADULT  Goal: Performs self-care activities at highest level of function for planned discharge setting  See evaluation for individualized goals  Description: Treatment Interventions: ADL retraining, Functional transfer training, UE strengthening/ROM, Endurance training, Cognitive reorientation, Patient/family training, Neuromuscular reeducation, Energy conservation    See flowsheet documentation for full assessment, interventions and recommendations  Note: Limitation: Decreased ADL status, Decreased UE strength, Decreased Safe judgement during ADL, Decreased cognition, Decreased endurance, Decreased self-care trans, Decreased high-level ADLs  Prognosis: Poor  Assessment: Pt is a 79 y o  male seen for OT evaluation s/p admit to [address] [place] [address] on 1/24/2021 w/ COVID-19 virus infection  Comorbidities affecting pt's functional performance at time of assessment include: Hyperlipidemia, DM II, HTN, CAD, Urinary retention, CKD, COVID-19  Personal factors affecting pt at time of IE include:limited home support, difficulty performing ADLS, limited insight into deficits and decreased initiation and engagement   Prior to admission, pt required A with ADLs  Upon evaluation: the following deficits impact occupational performance: decreased strength, decreased balance, decreased tolerance, impaired arousal, impaired attention, impaired initiation, impaired memory, impaired sequencing, impaired problem solving and decreased safety awareness  Pt to benefit from continued skilled OT tx while in the hospital to address deficits as defined above and maximize level of functional independence w ADL's and functional mobility  Occupational Performance areas to address include: bathing/shower, toilet hygiene, dressing, functional mobility and clothing management  From OT standpoint, recommendation at time of d/c would be STR       OT Discharge Recommendation: Post-Acute Rehabilitation Services  OT - OK to Discharge: Yes(when medically stable to STR)    Laila Rodrigues MS, OTR/L

## 2021-01-25 NOTE — CASE MANAGEMENT
LOS: 1, URR score: 54 and Red, Pt is not a 30 day re-admit or Medicare bundle  CM unable to meet with pt at bedside due to COVID protocol  CM spoke with pt SO Cyndee via phone to discuss discharge planning  Pt lives with his SO in a 1 story home; ramp entrance  Pt uses RW to ambulate at baseline  Also has cane, commode, w/c shower chair, glucometer, bp cuff and pulse ox  David does the driving, cooking, laundry,cleaning, medications and helps with dressing and bathing  Pt PCP is Pamela Watson  He uses [facility] for medications and denies any barriers to obtaining them  Pt has history of [facility]  No history of STR MH or D&A treatment  CM awaiting PT/OT for recommendations and will continue to follow  Pt may need transport if returning to home  CM to follow  Patient/caregiver received discharge checklist   Content reviewed  Patient/caregiver encouraged to participate in discharge plan of care prior to discharge home  CM reviewed d/c planning process including the following: identifying help at home, patient preference for d/c planning needs, availability of treatment team to discuss questions or concerns patient and/or family may have regarding understanding medications and recognizing signs and symptoms once discharged  CM also encouraged patient to follow up with all recommended appointments after discharge  Patient advised of importance for patient and family to participate in managing patients medical well being

## 2021-01-25 NOTE — ASSESSMENT & PLAN NOTE
· Will continue treatment under mild algorithm, patient currently not requiring oxygen  · Continue remdesivir day 2  · Patient was not started on dexamethasone or plasma therapy given that he is not requiring oxygen at this time  · Will continue to monitor inflammatory markers  · Will start ceftriaxone 1 g daily  · If procalcitonin remains normal will discontinue antibiotics  · Continue supportive care

## 2021-01-25 NOTE — OCCUPATIONAL THERAPY NOTE
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)    CAD (coronary artery disease)    Urinary retention    Acute renal failure superimposed on stage 5 chronic kidney disease, not on chronic dialysis [facility])      Past Medical History:   Diagnosis Date    Acute pancreatitis without infection or necrosis 9/26/2019    Age-related nuclear cataract, right 11/5/2020    Anemia of chronic renal failure     BPH (benign prostatic hyperplasia)     CKD (chronic kidney disease) stage 5, GFR less than 15 ml/min (HCC)     Coronary artery disease     DJD (degenerative joint disease)     Essential hypertension     Gait disturbance     Generalized weakness     GERD (gastroesophageal reflux disease)     Gout     History of transfusion     Hydronephrosis     Hyperlipidemia     Hypertension     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)     Pressure injury of skin     Proteinuria     Thrombophlebitis migrans     Type 2 diabetes mellitus        Past Surgical History:   Procedure Laterality Date    CATARACT EXTRACTION Right 11/05/2020    CATARACT EXTRACTION W/ INTRAOCULAR LENS IMPLANT Right 11/5/2020    Procedure: EYE OD CATARACT EXTRACTION WITH IOL INSERTION;  Surgeon: Urmila Euceda MD;  Location: [facility] MAIN OR;  Service: Ophthalmology    CHOLECYSTECTOMY      CHOLECYSTECTOMY LAPAROSCOPIC N/A 2/7/2020    Procedure: CHOLECYSTECTOMY LAPAROSCOPIC;  Surgeon:  Zulma Ortiz MD;  Location: [facility] MAIN OR;  Service: General    CORONARY ANGIOPLASTY WITH STENT PLACEMENT      FL RETROGRADE PYELOGRAM  5/21/2020    IR BIOPSY LIVER MASS  1/24/2019    IR CHOLECYSTOSTOMY TUBE PLACEMENT  9/6/2019    [place] CYSTO/URETERO W/LITHOTRIPSY &INDWELL STENT INSRT Left 5/21/2020    Procedure: CYSTOSCOPY URETEROSCOPY WITH LITHOTRIPSY HOLMIUM LASER, RETROGRADE PYELOGRAM AND INSERTION STENT URETERAL;  Surgeon: Divine Hwoell MD;  Location: [facility];  Service: Urology    MD CYSTOURETHROSCOPY,URETER CATHETER Left 4/21/2020    Procedure: CYSTOSCOPY WITH INSERTION STENT URETERAL;  Surgeon: Vincent Carr MD;  [facility];  Service: Urology        01/25/21 1128   OT Last Visit   OT Visit Date 01/25/21   Note Type   Note type Evaluation   Restrictions/Precautions   Weight Bearing Precautions Per Order No   Other Precautions Contact/isolation; Airborne/isolation; Bed Alarm;Cognitive; Fall Risk;Multiple lines;O2   Pain Assessment   Pain Assessment Tool FLACC   Pain Score No Pain   Pain Rating: FLACC (Rest) - Face 0   Pain Rating: FLACC (Rest) - Legs 0   Pain Rating: FLACC (Rest) - Activity 0   Pain Rating: FLACC (Rest) - Cry 0   Pain Rating: FLACC (Rest) - Consolability 0   Score: FLACC (Rest) 0   Pain Rating: FLACC (Activity) - Face 1   Pain Rating: FLACC (Activity) - Legs 0   Pain Rating: FLACC (Activity) - Activity 0   Pain Rating: FLACC (Activity) - Cry 1   Pain Rating: FLACC (Activity) - Consolability 1   Score: FLACC (Activity) 3   Home Living   Type of Home House   Home Layout One level;Performs ADLs on one level; Able to live on main level with bedroom/bathroom; Access; Ramped entrance   OpenTable Grab bars in shower; Shower chair;Commode   [facility]; Wheelchair-manual  (RW usage)   Prior Function   Level of [place] Needs assistance with IADLs; Needs assistance with ADLs and functional mobility   Lives With Spouse   Receives Help From Family   ADL Assistance Needs assistance   IADLs Needs assistance   Falls in the last 6 months 1 to 4   Vocational Retired   ADL   UB Bathing Assistance 1  Total Assistance   LB [address] 1  Total Assistance   UB Dressing Assistance 1  Total Assistance   LB Dressing Assistance 1  Total [address]  1  Total Assistance  (Pt soiled of bowel upon arrival)   Bed Mobility   Rolling R 2  Maximal assistance   Additional items Assist x 1;Bedrails; Increased time required;Verbal cues;LE management  (bowel incontinence noted, linen/pericare completed)   Rolling L 2  Maximal assistance   Additional items Assist x 1;Bedrails; Increased time required;Verbal cues;LE management  (bowel incontinence noted, linen/pericare completed)   Supine to Sit Unable to assess  (could not safely assess at this time)   Additional Comments POST/ANT pericare completed 2/2 diarrhea  Pt  was returned to supine positioning and immediate fell asleep  Transfers   Sit to Stand Unable to assess   Balance   Static Sitting   (unable to safely assess at this time)   Activity Tolerance   Activity Tolerance Patient limited by fatigue;Treatment limited secondary to medical complications (Comment)   Nurse Made Aware KALYN Adams   RUE Assessment   RUE Assessment X  (AROM WFL)   RUE Strength   RUE Overall Strength Deficits  (3-/5)   LUE Assessment   LUE Assessment X  (AROM WFL)   LUE Strength   LUE Overall Strength Deficits  (3-/5)   Cognition   Overall Cognitive Status Impaired   Arousal/Participation Lethargic;Cooperative   Orientation Level Oriented to person;Disoriented to time;Disoriented to situation;Oriented to place   Memory Decreased short term memory;Decreased recall of recent events;Decreased recall of precautions   Following Commands Follows one step commands with increased time or repetition   Assessment   Limitation Decreased ADL status; Decreased UE strength;Decreased Safe judgement during ADL;Decreased cognition;Decreased endurance;Decreased self-care trans;Decreased high-level ADLs   Prognosis Poor   Assessment Pt is a 79 y o  male seen for OT evaluation s/p admit to [address] [place] [address] on 1/24/2021 w/ COVID-19 virus infection  Comorbidities affecting pt's functional performance at time of assessment include: Hyperlipidemia, DM II, HTN, CAD, Urinary retention, CKD, COVID-19  Personal factors affecting pt at time of IE include:limited home support, difficulty performing ADLS, limited insight into deficits and decreased initiation and engagement   Prior to admission, pt required A with ADLs  Upon evaluation: the following deficits impact occupational performance: decreased strength, decreased balance, decreased tolerance, impaired arousal, impaired attention, impaired initiation, impaired memory, impaired sequencing, impaired problem solving and decreased safety awareness  Pt to benefit from continued skilled OT tx while in the hospital to address deficits as defined above and maximize level of functional independence w ADL's and functional mobility  Occupational Performance areas to address include: bathing/shower, toilet hygiene, dressing, functional mobility and clothing management  From OT standpoint, recommendation at time of d/c would be STR  Goals   Patient Goals Pt could not provide re:decreased engagement, lethargy   Plan   Treatment Interventions ADL retraining;Functional transfer training;UE strengthening/ROM; Endurance training;Cognitive reorientation;Patient/family training;Neuromuscular reeducation; Energy conservation   Goal Expiration Date 02/04/21   OT Treatment Day 0   OT Frequency 3-5x/wk   Recommendation   OT Discharge Recommendation Post-Acute Rehabilitation Services   OT - OK to Discharge Yes  (when medically stable to STR)   Additional Comments  [facility] raw score of 11, standardized score of 19 45  As per the data, this score is correlated most closely with a STR recommendation  However,the therapist's discharge disposition recommendation may vary as numerous social factors and objective findings contribute to the suggested destination     AM-PAC Daily Activity Inpatient   Lower Body Dressing 1   Bathing 1   Toileting 1   Upper Body Dressing 1   Grooming 2   Eating 2   Daily Activity Raw Score 8   Turning Head Towards Sound 2   Follow Simple Instructions 1   Low Function Daily Activity Raw Score 11   Low Function Daily Activity Standardized Score 19 45     GOALS:    Pt will achieve the following within specified time frame: STG  Pt will achieve the following goals within 5 days    *ADL transfers with Max (A) for inc'd independence with ADLs/purposeful tasks    *Self Feeding- Mod (A) for inc'd independence with providing self nourishment    *UB ADL with Max (A) for inc'd independence with self cares    *LB ADL with Max (A) using AE prn for inc'd independence with self cares    *Toileting with Max (A) for clothing management and hygiene for return to PLOF with personal care    *Increase static stand balance to P and dyn stand balance to P- for inc'd safety with standing purposeful tasks    *Increase stand tolerance x1 m for inc'd tolerance with standing purposeful tasks    *Participate in 10m UE therex to increase overall stamina/activity tolerance for purposeful tasks    *Bed mobility- Max (A) for inc'd independence to manage own comfort and initiate EOB & OOB purposeful tasks    Pt will achieve the following within specified time frame: LTG  Pt will achieve the following goals within 10 days    *ADL transfers with Mod (A) for inc'd independence with ADLs/purposeful tasks    *Self Feeding- Min (A) for inc'd independence with providing self nourishment    *UB ADL with Mod (A) for inc'd independence with self cares    *LB ADL with Mod (A) using AE prn for inc'd independence with self cares    *Toileting with Mod (A) for clothing management and hygiene for return to PLOF with personal care    *Increase static stand balance to P+ and dyn stand balance to P for inc'd safety with standing purposeful tasks    *Increase stand tolerance x3 m for inc'd tolerance with standing purposeful tasks    *Bed mobility- Mod (A) for inc'd independence to manage own comfort and initiate EOB & OOB purposeful tasks      Laila Burkett MS, OTR/L

## 2021-01-25 NOTE — ASSESSMENT & PLAN NOTE
· 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  · Follows with Dr Geovanny Omalley, on monthly injectable

## 2021-01-25 NOTE — ASSESSMENT & PLAN NOTE
· SILVERIO on CKD 5 due to COVID-19  · Will hydrate with IV fluids and recheck labs in a m     We will try to limit amount of IV fluids due to risk of pulmonary edema/capillary leak with COVID  Hold furosemide   · Start remdesivir given risk factors and age  Check inflammatory markers in a m      Lab Results   Component Value Date    SARSCOV2 Positive (A) 01/24/2021    SARSCOV2 Negative 12/14/2020    SARSCOV2 Negative 09/20/2020    SARSCOV2 Not Detected 05/14/2020    SARSCOV2 Negative 04/21/2020              Results from last 7 days   Lab Units 01/24/21  1800   BUN mg/dL 53*   CREATININE mg/dL 6 34*   EGFR ml/min/1 73sq m 8

## 2021-01-25 NOTE — H&P
Internal Medicine  H&P- David Camp 1940, 79 y o  male MRN: [id number]  Unit/Bed#: ED 02 Encounter: [id number]  Primary Care Provider: RIKA Isbell   Date and time admitted to hospital: 1/24/2021  4:30 PM        Assessment and Plan  * Acute kidney injury due to COVID-19 [facility])  Assessment & Plan  · SILVERIO on CKD 5 due to COVID-19  · Will hydrate with IV fluids and recheck labs in a m     We will try to limit amount of IV fluids due to risk of pulmonary edema/capillary leak with COVID  Hold furosemide   · Start remdesivir given risk factors and age  Check inflammatory markers in a m  Lab Results   Component Value Date    SARSCOV2 Positive (A) 01/24/2021    SARSCOV2 Negative 12/14/2020    SARSCOV2 Negative 09/20/2020    SARSCOV2 Not Detected 05/14/2020    SARSCOV2 Negative 04/21/2020              Results from last 7 days   Lab Units 01/24/21  1800   BUN mg/dL 53*   CREATININE mg/dL 6 34*   EGFR ml/min/1 73sq m 8       CAD (coronary artery disease)  Assessment & Plan  · Coronary artery disease status post stenting  No chest pain  · Continue aspirin    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)  Assessment & Plan  · 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  · Follows with Dr Priscilla Brunner, on monthly injectable    Essential hypertension  Assessment & Plan  · Essential hypertension continue nifedipine    Type 2 diabetes mellitus with hyperglycemia, with long-term current use of insulin [facility])  Assessment & Plan  Lab Results   Component Value Date    HGBA1C 6 8 (H) 09/21/2020     Recent Labs     01/24/21  1808 01/24/21  2046   POCGLU 215* 163*     · Diabetes mellitus which patient's spouse reports levemir recently decreased to 5 units at bedtime  At sliding scale  VTE Prophylaxis: Heparin  Code Status: Level 1 - Full Code  Anticipated Length of Stay:  Patient will be admitted on an Inpatient basis with an anticipated length of stay of  greater than 2 midnights       Justification for CHRISTI MAI Stay: Acute kidney injury due to COVID-19 [facility])  Total Time for Visit, including Counseling / Coordination of Care: x mins  Greater than 50% of this total time spent on direct patient counseling and coordination of care  Chief Complaint:     Chief Complaint   Patient presents with    Weakness - Generalized     History of Present Illness:    Enma Heredia is a 79 y o  male with a past medical history of metastatic neuroendocrine tumor, diabetes mellitus, CAD who presents with worsening weakness  Girlfriend at bedside to help provide history  The patient is on injectables by hematology and was his usual self this week  He went to see nephrology on Wednesday  The following day he had worsening debility and has essentially been in bed with very little activity or oral intake  Due to worsening weakness he was brought to the emergency department today where he tested positive for COVI D  There are no other family members with Noam  He actually denies any fevers or shortness of breath  He does acknowledge that he is weak but cannot provide any further history  Review of Systems:  Review of Systems   Constitutional: Positive for activity change, appetite change and fatigue  Negative for chills, diaphoresis and fever  HENT: Negative for dental problem and facial swelling  Eyes: Negative for photophobia, pain and visual disturbance  Respiratory: Negative for wheezing and stridor  Cardiovascular: Negative for chest pain and palpitations  Gastrointestinal: Positive for diarrhea  Negative for abdominal distention, abdominal pain, nausea and vomiting  Genitourinary: Negative for dysuria, hematuria and urgency  Musculoskeletal: Negative for back pain and myalgias  Skin: Negative for rash  Neurological: Positive for weakness  Negative for dizziness, seizures, speech difficulty, numbness and headaches  Psychiatric/Behavioral: Negative for agitation and suicidal ideas   The patient is not nervous/anxious  All other systems reviewed and are negative  Past Medical and Surgical History:   Past Medical History:   Diagnosis Date    Acute pancreatitis without infection or necrosis 9/26/2019    Age-related nuclear cataract, right 11/5/2020    Anemia of chronic renal failure     BPH (benign prostatic hyperplasia)     CKD (chronic kidney disease) stage 5, GFR less than 15 ml/min (HCC)     Coronary artery disease     DJD (degenerative joint disease)     Essential hypertension     Gait disturbance     Generalized weakness     GERD (gastroesophageal reflux disease)     Gout     History of transfusion     Hydronephrosis     Hyperlipidemia     Hypertension     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)     Pressure injury of skin     Proteinuria     Thrombophlebitis migrans     Type 2 diabetes mellitus      Past Surgical History:   Procedure Laterality Date    CATARACT EXTRACTION Right 11/05/2020    CATARACT EXTRACTION W/ INTRAOCULAR LENS IMPLANT Right 11/5/2020    Procedure: EYE OD CATARACT EXTRACTION WITH IOL INSERTION;  Surgeon: Donald Gay MD;  Location: [facility] MAIN OR;  Service: Ophthalmology    CHOLECYSTECTOMY     Effie Abernathy N/A 2/7/2020    Procedure: CHOLECYSTECTOMY LAPAROSCOPIC;  Surgeon:  Yogi Thrasher MD;  Location: [facility] MAIN OR;  Service: General    CORONARY ANGIOPLASTY WITH STENT PLACEMENT      FL RETROGRADE PYELOGRAM  5/21/2020    IR BIOPSY LIVER MASS  1/24/2019    IR CHOLECYSTOSTOMY TUBE PLACEMENT  9/6/2019    [place] CYSTO/URETERO W/LITHOTRIPSY &INDWELL STENT INSRT Left 5/21/2020    Procedure: CYSTOSCOPY URETEROSCOPY WITH LITHOTRIPSY HOLMIUM LASER, RETROGRADE PYELOGRAM AND INSERTION STENT URETERAL;  Surgeon: Denita Ortiz MD;  Location: [facility];  Service: Urology    [place] CYSTOURETHROSCOPY,URETER CATHETER Left 4/21/2020    Procedure: CYSTOSCOPY WITH INSERTION STENT URETERAL;  Surgeon: Iglesia Ayala MD;  [facility]; Service: Urology     Meds/Allergies: Allergies: No Known Allergies  Prior to Admission Medications   Prescriptions Last Dose Informant Patient Reported? Taking?    B Complex-C-Folic Acid (TRIPHROCAPS) 1 MG CAPS  Spouse/Significant Other No No   Sig: Take 1 capsule (1 mg total) by mouth daily with dinner   NIFEdipine ER (ADALAT CC) 60 MG 24 hr tablet  Spouse/Significant Other No No   Sig: Take 1 tablet (60 mg total) by mouth daily   acetaminophen (TYLENOL) 325 mg tablet  Spouse/Significant Other Yes No   Sig: Take 650 mg by mouth every 6 (six) hours as needed    aspirin 81 MG tablet  Spouse/Significant Other Yes No   Sig: Take 81 mg by mouth daily   cholecalciferol (VITAMIN D3) 1,000 units tablet  Spouse/Significant Other Yes No   Sig: Take 1,000 Units by mouth daily Take 2   colchicine (COLCRYS) 0 6 mg tablet   No No   Sig: Take 1 tablet (0 6 mg total) by mouth daily   Patient taking differently: Take 0 3 mg by mouth daily    famotidine (PEPCID) 10 mg tablet  Spouse/Significant Other No No   Sig: Take 1 tablet (10 mg total) by mouth daily at bedtime   furosemide (LASIX) 40 mg tablet  Spouse/Significant Other No No   Sig: Take 1 tablet (40 mg total) by mouth daily , do NOT take if you do not have swelling   insulin detemir (LEVEMIR) 100 units/mL subcutaneous injection   Yes Yes   Sig: Inject 5 Units under the skin daily at bedtime   magnesium oxide (MAG-OX) 400 mg   No No   Sig: Take 0 5 tablets (200 mg total) by mouth 2 (two) times a day   pantoprazole (PROTONIX) 40 mg tablet  Spouse/Significant Other No No   Sig: Take 1 tablet (40 mg total) by mouth daily   sodium bicarbonate 650 mg tablet  Spouse/Significant Other Yes No   Sig: Take 325 mg by mouth 2 (two) times a day    sucralfate (CARAFATE) 1 g/10 mL suspension  Spouse/Significant Other No No   Sig: Take 10 mL (1,000 mg total) by mouth every 6 (six) hours   tamsulosin (FLOMAX) 0 4 mg  Spouse/Significant Other No No   Sig: Take 1 capsule (0 4 mg total) by mouth daily with dinner   vitamin B-12 (VITAMIN B-12) 1,000 mcg tablet  Spouse/Significant Other Yes No   Sig: Take 100 mcg by mouth daily       Facility-Administered Medications: None     Social History:     Social History     Socioeconomic History    Marital status:       Spouse name: Not on file    Number of children: Not on file    Years of education: Not on file    Highest education level: Not on file   Occupational History    Not on file   Social Needs    Financial resource strain: Not on file    Food insecurity     Worry: Not on file     Inability: Not on file    Transportation needs     Medical: Not on file     Non-medical: Not on file   Tobacco Use    Smoking status: Never Smoker    Smokeless tobacco: Never Used   Substance and Sexual Activity    Alcohol use: Not Currently     Alcohol/week: 0 0 standard drinks     Frequency: Never    Drug use: Never    Sexual activity: Not Currently   Lifestyle    Physical activity     Days per week: Not on file     Minutes per session: Not on file    Stress: Not on file   Relationships    Social connections     Talks on phone: Not on file     Gets together: Not on file     Attends Zoroastrianism service: Not on file     Active member of club or organization: Not on file     Attends meetings of clubs or organizations: Not on file     Relationship status: Not on file    Intimate partner violence     Fear of current or ex partner: Not on file     Emotionally abused: Not on file     Physically abused: Not on file     Forced sexual activity: Not on file   Other Topics Concern    Not on file   Social History Narrative    Not on file     Patient Pre-hospital Living Situation:  Lives with girlfriend  Patient Pre-hospital Level of Mobility:   Patient Pre-hospital Diet Restrictions:     Family History:  Family History   Problem Relation Age of Onset    Cancer Mother     Hypertension Father     Cancer Brother     Cancer Maternal Grandmother     Cancer Paternal Aunt Physical Exam:   Vitals:   Blood Pressure: 164/73 (01/24/21 1845)  Pulse: 73 (01/24/21 1845)  Temperature: 97 6 °F (36 4 °C) (01/24/21 1631)  Temp Source: Temporal (01/24/21 1631)  Respirations: 16 (01/24/21 1845)  Height: 5' 7" (170 2 cm) (01/24/21 1631)  Weight - Scale: 72 1 kg (159 lb) (01/24/21 1631)  SpO2: 95 % (01/24/21 1845)    Physical Exam  Vitals signs (Girlfriend friend at bedside) reviewed  Constitutional:       General: He is not in acute distress  Appearance: He is ill-appearing  HENT:      Head: Atraumatic  Mouth/Throat:      Mouth: Mucous membranes are dry  Eyes:      Extraocular Movements: Extraocular movements intact  Comments: Right pupil more dilated than left   Cardiovascular:      Rate and Rhythm: Normal rate and regular rhythm  Heart sounds: Normal heart sounds  Pulmonary:      Effort: Pulmonary effort is normal       Breath sounds: Decreased breath sounds present  No wheezing  Abdominal:      General: Bowel sounds are normal       Palpations: Abdomen is soft  Tenderness: There is no abdominal tenderness  There is no rebound  Musculoskeletal:         General: No swelling or tenderness  Skin:     General: Skin is warm and dry  Neurological:      General: No focal deficit present  Mental Status: He is alert and oriented to person, place, and time  Cranial Nerves: No cranial nerve deficit  Psychiatric:         Mood and Affect: Mood is depressed  Behavior: Behavior is slowed  Lab Results: I have personally reviewed pertinent reports      Results from last 7 days   Lab Units 01/24/21  1800   WBC Thousand/uL 4 20*   HEMOGLOBIN g/dL 9 5*   HEMATOCRIT % 28 4*   PLATELETS Thousands/uL 124*   NEUTROS PCT % 70   LYMPHS PCT % 17*   MONOS PCT % 13*   EOS PCT % 0     Results from last 7 days   Lab Units 01/24/21  1800   SODIUM mmol/L 139   POTASSIUM mmol/L 3 6   CHLORIDE mmol/L 100   CO2 mmol/L 25   ANION GAP mmol/L 14*   BUN mg/dL 53* CREATININE mg/dL 6 34*   CALCIUM mg/dL 8 2*   ALBUMIN g/dL 3 6   TOTAL BILIRUBIN mg/dL 0 50   ALK PHOS U/L 77   ALT U/L 8   AST U/L 14   EGFR ml/min/1 73sq m 8   GLUCOSE RANDOM mg/dL 195*     Results from last 7 days   Lab Units 01/24/21  1800   INR  1 08     Results from last 7 days   Lab Units 01/24/21  1800   TROPONIN I ng/mL 0 04*     Results from last 7 days   Lab Units 01/24/21  1800   LACTIC ACID mmol/L 0 8                    Invalid input(s): URIBILINOGEN        Results from last 7 days   Lab Units 01/24/21  1800   INFLUENZA A PCR  Negative   INFLUENZA B PCR  Negative   RSV PCR  Negative     Imaging: I have personally reviewed pertinent films in PACS  Ct Chest Abdomen Pelvis Wo Contrast    Result Date: 1/24/2021  Impression: 1  Bilateral groundglass opacities in a peripheral distribution concerning for multifocal Covid-19 pneumonia  2   Bilateral pleural effusions, right greater than left  3   Trace abdominal ascites with stable omental implants inserted for carcinomatosis  4   Stable partially calcified mesenteric mass  Workstation performed: [id number]     Ct Head Without Contrast    Result Date: 1/24/2021  Impression: No acute intracranial hemorrhage, mass effect or edema  Microangiopathic changes  Workstation performed: [id number]       EKG, Pathology, and Other Studies Reviewed on Admission:   EKG  Result Date: 01/24/21  Personally reviewed strips with impression of:  Normal sinus rhythm 76 bpm    Allscripts/ Epic Records Reviewed: Yes    ** Please Note: This note has been constructed using a voice recognition system   **

## 2021-01-25 NOTE — PLAN OF CARE
Problem: PHYSICAL THERAPY ADULT  Goal: Performs mobility at highest level of function for planned discharge setting  See evaluation for individualized goals  Description: Treatment/Interventions: ADL retraining, Functional transfer training, LE strengthening/ROM, Therapeutic exercise, Endurance training, Cognitive reorientation, Patient/family training, Bed mobility, Continued evaluation, Spoke to nursing, OT  Equipment Recommended: (TBD)       See flowsheet documentation for full assessment, interventions and recommendations  Note: Prognosis: Guarded  Problem List: Decreased strength, Decreased endurance, Impaired balance, Decreased mobility, Decreased coordination, Decreased cognition, Impaired judgement, Decreased safety awareness  Assessment: Pt is 79 y o  male seen for PT evaluation s/p admit to [address] [place] [address] on 1/24/2021 w/ COVID-19 virus infection  PT consulted to assess pt's functional mobility and d/c needs  Order placed for PT eval and tx, w/ up w/ A order  Comorbidities affecting pt's physical performance at time of assessment include: weakness,COVID-19 virus, ARF on CKD, CAD, 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, DM, urinary retention   PTA, pt was lives w/ family in one level house  Personal factors affecting pt at time of IE include: communication issues, inability to ambulate household distances, unable to perform dynamic tasks in community, decreased cognition, positive fall history, decreased initiation and engagement, unable to perform physical activity and limited insight into impairments  Please find objective findings from PT assessment regarding body systems outlined above with impairments and limitations including weakness, impaired balance, decreased endurance, impaired coordination, decreased activity tolerance, decreased functional mobility tolerance, decreased safety awareness, impaired judgement, fall risk and decreased cognition   The following objective measures performed on IE also reveal limitations: Barthel Index: 5/100  Pt's clinical presentation is currently unstable/unpredictable seen in pt's presentation of supplemental O2 requirement, progressive symptoms prior to hospitalization,abnormal lab values,impaired cognition  Pt to benefit from continued PT tx to address deficits as defined above and maximize level of functional independent mobility and consistency  From PT/mobility standpoint, recommendation at time of d/c would be STR pending progress in order to facilitate return to PLOF  PT Discharge Recommendation: (S) Post-Acute Rehabilitation Services     PT - OK to Discharge: No    See flowsheet documentation for full assessment

## 2021-01-25 NOTE — ASSESSMENT & PLAN NOTE
· Likely exacerbated by COVID infection    Baseline creatinine appears to be around 4-5  · Will continue gentle hydration  · Lasix held for now  · Nephrology consulted  · Will monitor urine output  · Avoid any nephrotoxic meds

## 2021-01-25 NOTE — ASSESSMENT & PLAN NOTE
Lab Results   Component Value Date    HGBA1C 6 8 (H) 09/21/2020     Recent Labs     01/24/21  1808 01/24/21  2046   POCGLU 215* 163*     · Diabetes mellitus which patient's spouse reports levemir recently decreased to 5 units at bedtime  At sliding scale

## 2021-01-25 NOTE — PHYSICAL THERAPY NOTE
Physical Therapy Evaluation     Patient's Name: Danny Thomas    Admitting Diagnosis  Metastatic cancer ([place] [address] ) [C79 9]  Weakness [R53 1]  SILVERIO (acute kidney injury) ([place] [address] ) [N17 9]  Generalized weakness [R53 1]  COVID-19 [U07 1]    Problem List  Patient Active Problem List   Diagnosis    Type 2 diabetes mellitus with hyperglycemia, with long-term current use of insulin ([place] [address] )    Essential hypertension    Mixed hyperlipidemia    Iron deficiency anemia secondary to inadequate dietary iron intake    Syncope and collapse    Liver mass    Neuroendocrine tumor    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 ([place] [address] )    Pressure injury of right heel, unstageable ([place] [address] )    Atherosclerosis of artery of extremity with ulceration ([place] [address] )    Carcinoid syndrome ([facility])    Urinary catheter in place    Malignant neuroendocrine neoplasm ([place] [address] )    Abdominal pain    UTI due to extended-spectrum beta lactamase (ESBL) producing Escherichia coli    Hypomagnesemia    Normocytic anemia    Goals of care, counseling/discussion    Hyperparathyroid bone disease ([place] [address] )    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    Sepsis ([place] [address] )    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    Low TSH level    Severe protein-calorie malnutrition (HCC)    Chronic Diastolic congestive heart failure    Premature atrial complexes    Bradycardia    Generalized weakness    Recurrent UTI    Gastroesophageal reflux disease without esophagitis    Ureterolithiasis    Obstructive uropathy    Gait difficulty    CAD (coronary artery disease)    Azotemia    Acidosis    Hypertensive kidney disease with stage 5 chronic kidney disease, not on chronic dialysis ([facility])    Edema    Chest pain    Chest pressure    Urinary retention    Elevated d-dimer    Esophageal dysphagia    Dizziness    Anemia due to chronic kidney disease    CKD (chronic kidney disease) stage 5, GFR less than 15 ml/min (HCC)    Syncope    SIRS (systemic inflammatory response syndrome) ([facility])    Acute renal failure superimposed on stage 5 chronic kidney disease, not on chronic dialysis ([facility] [address] )    COVID-19 virus infection       Past Medical History  Past Medical History:   Diagnosis Date    Acute pancreatitis without infection or necrosis 9/26/2019    Age-related nuclear cataract, right 11/5/2020    Anemia of chronic renal failure     BPH (benign prostatic hyperplasia)     CKD (chronic kidney disease) stage 5, GFR less than 15 ml/min (HCC)     Coronary artery disease     DJD (degenerative joint disease)     Essential hypertension     Gait disturbance     Generalized weakness     GERD (gastroesophageal reflux disease)     Gout     History of transfusion     Hydronephrosis     Hyperlipidemia     Hypertension     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)     Pressure injury of skin     Proteinuria     Thrombophlebitis migrans     Type 2 diabetes mellitus        Past Surgical History  Past Surgical History:   Procedure Laterality Date    CATARACT EXTRACTION Right 11/05/2020    CATARACT EXTRACTION W/ INTRAOCULAR LENS IMPLANT Right 11/5/2020    Procedure: EYE OD CATARACT EXTRACTION WITH IOL INSERTION;  Surgeon: Mirella Spear MD;  Location: [address] [place] [address] MAIN OR;  Service: Ophthalmology    CHOLECYSTECTOMY      CHOLECYSTECTOMY LAPAROSCOPIC N/A 2/7/2020    Procedure: CHOLECYSTECTOMY LAPAROSCOPIC;  Surgeon:  Finn Stevenson MD;  Location: [address] [place] [address] MAIN OR;  Service: General    CORONARY ANGIOPLASTY WITH STENT PLACEMENT      FL RETROGRADE PYELOGRAM  5/21/2020    IR BIOPSY LIVER MASS  1/24/2019    IR CHOLECYSTOSTOMY TUBE PLACEMENT  9/6/2019    MS CYSTO/URETERO W/LITHOTRIPSY &INDWELL STENT INSRT Left 5/21/2020    Procedure: CYSTOSCOPY URETEROSCOPY WITH LITHOTRIPSY HOLMIUM LASER, RETROGRADE PYELOGRAM AND INSERTION STENT URETERAL;  Surgeon: Ravindra Benson MD;  Location: [facility] MAIN [facility];  Service: Urology    MS CYSTOURETHROSCOPY,URETER CATHETER Left 4/21/2020    Procedure: CYSTOSCOPY WITH INSERTION STENT URETERAL;  Surgeon: Malcom Resendiz MD;  [facility] Main [facility];  Service: Urology        01/25/21 0114   PT Last Visit   PT Visit Date 01/25/21   Note Type   Note type Evaluation   Pain Assessment   Pain Assessment Tool FLACC   Pain Score No Pain   Pain Rating: FLACC (Rest) - Face 0   Pain Rating: FLACC (Rest) - Legs 0   Pain Rating: FLACC (Rest) - Activity 0   Pain Rating: FLACC (Rest) - Cry 0   Pain Rating: FLACC (Rest) - Consolability 0   Score: FLACC (Rest) 0   Pain Rating: FLACC (Activity) - Face 1   Pain Rating: FLACC (Activity) - Legs 0   Pain Rating: FLACC (Activity) - Activity 0   Pain Rating: FLACC (Activity) - Cry 1   Pain Rating: FLACC (Activity) - Consolability 1   Score: FLACC (Activity) 3   Home Living   Type of Home House   Home Layout One level;Performs ADLs on one level; Able to live on main level with bedroom/bathroom; Access; Ramped entrance   [employer] Grab bars in shower; Shower chair;Commode   [facility]; Wheelchair-manual  (RW usage)   Prior Function   Level of [place] Needs assistance with IADLs; Needs assistance with ADLs and functional mobility   Lives With Spouse   Receives Help From Family   ADL Assistance Needs assistance   IADLs Needs assistance   Falls in the last 6 months 1 to 4   Vocational Retired   Restrictions/Precautions   [facility] Bearing Precautions Per Order No   Other Precautions Contact/isolation; Airborne/isolation; Bed Alarm;Cognitive; Fall Risk;Multiple lines;O2   General   Family/Caregiver Present No   Cognition   Overall Cognitive Status Impaired   Arousal/Participation Arousable   Orientation Level Oriented to person;Disoriented to time;Disoriented to situation;Oriented to place   Memory Decreased short term memory;Decreased recall of recent events;Decreased recall of precautions   Following Commands Follows one step commands with increased time or repetition   Comments Pt  required consistent verbal cues for task completion  RUE Assessment   RUE Assessment WFL   LUE Assessment   LUE Assessment WFL   RLE Assessment   RLE Assessment X  (3-/5 gross musculature)   LLE Assessment   LLE Assessment X  (3-/5 gross musculature)   Coordination   Movements are Fluid and Coordinated 0   Light Touch   RLE Light Touch Grossly intact   LLE Light Touch Grossly intact   Bed Mobility   Rolling R 2  Maximal assistance   Additional items Assist x 1;Bedrails; Increased time required;Verbal cues;LE management  (bowel incontinence noted, linen/pericare completed)   Rolling L 2  Maximal assistance   Additional items Assist x 1;Bedrails; Increased time required;Verbal cues;LE management  (bowel incontinence noted, linen/pericare completed)   Supine to Sit Unable to assess  (could not safely assess at this time)   Additional Comments POST/ANT pericare completed 2/2 diarrhea  Pt  was returned to supine positioning and immediate fell asleep  Transfers   Sit to Stand Unable to assess   Ambulation/Elevation   Gait pattern Not appropriate; Not tested   Balance   Static Sitting   (unable to safely assess at this time)   Endurance Deficit   Endurance Deficit Yes   Endurance Deficit Description Fatigue w/minimal mobility  Activity Tolerance   Activity Tolerance Patient limited by fatigue;Treatment limited secondary to medical complications (Comment)   Nurse Made Aware yes,Roxy [place]   Assessment   Prognosis Guarded   Problem List Decreased strength;Decreased endurance; Impaired balance;Decreased mobility; Decreased coordination;Decreased cognition; Impaired judgement;Decreased safety awareness   Assessment Pt is 79 y o  male seen for PT evaluation s/p admit to [address] [place] [address] on 1/24/2021 w/ COVID-19 virus infection  PT consulted to assess pt's functional mobility and d/c needs  Order placed for PT eval and tx, w/ up w/ A order   Comorbidities affecting pt's physical performance at time of assessment include: weakness,COVID-19 virus, ARF on CKD, CAD, 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, DM, urinary retention   PTA, pt was lives w/ family in one level house  Personal factors affecting pt at time of IE include: communication issues, inability to ambulate household distances, unable to perform dynamic tasks in community, decreased cognition, positive fall history, decreased initiation and engagement, unable to perform physical activity and limited insight into impairments  Please find objective findings from PT assessment regarding body systems outlined above with impairments and limitations including weakness, impaired balance, decreased endurance, impaired coordination, decreased activity tolerance, decreased functional mobility tolerance, decreased safety awareness, impaired judgement, fall risk and decreased cognition  The following objective measures performed on IE also reveal limitations: Barthel Index: 5/100  Pt's clinical presentation is currently unstable/unpredictable seen in pt's presentation of supplemental O2 requirement, progressive symptoms prior to hospitalization,abnormal lab values,impaired cognition  Pt to benefit from continued PT tx to address deficits as defined above and maximize level of functional independent mobility and consistency  From PT/mobility standpoint, recommendation at time of d/c would be STR pending progress in order to facilitate return to PLOF  Goals   Patient Goals pt  could not provide re:decreased engagement, lethargy   LTG Expiration Date 02/04/21   Long Term Goal #1 Pt will(dependent on patient's ability to safely participate in interventions) perform bed mobility tasks to min A of 1 to decrease burden of care  Perform transfers to mod A of 2 to decrease risk of skin breakdown with change of position  Patient will tolerate AAROM<->AROM BLE's to decrease risk of contractures and facilitate joint proprioception   Patient will demonstrate increased static sitting balance to Fair-,  Tactile cues 100% to facilitate activation of stabilizing muscles and prepare for safe transfers  PT Treatment Day 0   Plan   Treatment/Interventions ADL retraining;Functional transfer training;LE strengthening/ROM; Therapeutic exercise; Endurance training;Cognitive reorientation;Patient/family training;Bed mobility;Continued evaluation;Spoke to nursing;OT   PT Frequency Monitor status   Recommendation   PT Discharge Recommendation Post-Acute Rehabilitation Services   Equipment Recommended   (TBD)   PT - OK to Discharge No   Additional Comments Upon conclusion, pt  was resting in bed w/all needs within reach  Additional Comments [facility] raw score of 12, standardized score of 18 33  As per the data, this score is correlated most closely with a LTAC recommendation  However,the therapist's discharge disposition recommendation may vary as numerous social factors and objective findings contribute to the suggested destination     AM-PAC Basic Mobility Inpatient   Turning in Bed Without Bedrails 2   Lying on Back to Sitting on Edge of Flat Bed 2   Moving Bed to Chair 1   Standing Up From Chair 1   Walk in Room 1   Climb 3-5 Stairs 1   Basic Mobility Inpatient Raw Score 8   Turning Head Towards Sound 2   Follow Simple Instructions 2   Low Function Basic Mobility Raw Score 12   Low Function Basic Mobility Standardized Score 18 33   Barthel Index   Feeding 5   Bathing 0   Grooming Score 0   Dressing Score 0   Bladder Score 0   Bowels Score 0   Toilet Use Score 0   Transfers (Bed/Chair) Score 0   Mobility (Level Surface) Score 0   Stairs Score 0   Barthel Index Score 5       Lorraine Seen, PT

## 2021-01-26 PROBLEM — J12.82 PNEUMONIA DUE TO COVID-19 VIRUS: Status: ACTIVE | Noted: 2021-01-01

## 2021-01-26 NOTE — PHYSICAL THERAPY NOTE
Physical Therapy Treatment Session Note    Patient's Name: Ivy Robles    Admitting Diagnosis  Metastatic cancer ([place] [address] ) [C79 9]  Weakness [R53 1]  SILVERIO (acute kidney injury) ([place] [address] ) [N17 9]  Generalized weakness [R53 1]  COVID-19 [U07 1]    Problem List  Patient Active Problem List   Diagnosis    Type 2 diabetes mellitus with hyperglycemia, with long-term current use of insulin ([place] [address] )    Essential hypertension    Mixed hyperlipidemia    Iron deficiency anemia secondary to inadequate dietary iron intake    Syncope and collapse    Liver mass    Neuroendocrine tumor    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 ([place] [address] )    Pressure injury of right heel, unstageable ([place] [address] )    Atherosclerosis of artery of extremity with ulceration ([place] [address] )    Carcinoid syndrome (HCC)    Urinary catheter in place    Malignant neuroendocrine neoplasm ([place] [address] )    Abdominal pain    UTI due to extended-spectrum beta lactamase (ESBL) producing Escherichia coli    Hypomagnesemia    Normocytic anemia    Goals of care, counseling/discussion    Hyperparathyroid bone disease (HCC)    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    Sepsis ([place] [address] )    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    Low TSH level    Severe protein-calorie malnutrition (HCC)    Chronic Diastolic congestive heart failure    Premature atrial complexes    Bradycardia    Generalized weakness    Recurrent UTI    Gastroesophageal reflux disease without esophagitis    Ureterolithiasis    Obstructive uropathy    Gait difficulty    CAD (coronary artery disease)    Azotemia    Acidosis    Hypertensive kidney disease with stage 5 chronic kidney disease, not on chronic dialysis (HCC)    Edema    Chest pain    Chest pressure    Urinary retention    Elevated d-dimer    Esophageal dysphagia    Dizziness    Anemia due to chronic kidney disease    CKD (chronic kidney disease) stage 5, GFR less than 15 ml/min (HCC)    Syncope    SIRS (systemic inflammatory response syndrome) ([place] [address] )  Acute renal failure superimposed on stage 5 chronic kidney disease, not on chronic dialysis ([facility] )    COVID-19 virus infection       Past Medical History  Past Medical History:   Diagnosis Date    Acute pancreatitis without infection or necrosis 9/26/2019    Age-related nuclear cataract, right 11/5/2020    Anemia of chronic renal failure     BPH (benign prostatic hyperplasia)     CKD (chronic kidney disease) stage 5, GFR less than 15 ml/min (HCC)     Coronary artery disease     DJD (degenerative joint disease)     Essential hypertension     Gait disturbance     Generalized weakness     GERD (gastroesophageal reflux disease)     Gout     History of transfusion     Hydronephrosis     Hyperlipidemia     Hypertension     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)     Pressure injury of skin     Proteinuria     Thrombophlebitis migrans     Type 2 diabetes mellitus        Past Surgical History  Past Surgical History:   Procedure Laterality Date    CATARACT EXTRACTION Right 11/05/2020    CATARACT EXTRACTION W/ INTRAOCULAR LENS IMPLANT Right 11/5/2020    Procedure: EYE OD CATARACT EXTRACTION WITH IOL INSERTION;  Surgeon: Yo Encarnacion MD;  Location: [address] [place] [address] MAIN OR;  Service: Ophthalmology    CHOLECYSTECTOMY      CHOLECYSTECTOMY LAPAROSCOPIC N/A 2/7/2020    Procedure: CHOLECYSTECTOMY LAPAROSCOPIC;  Surgeon:  Jennifer Bailey MD;  Location: [address] [place] [address] MAIN OR;  Service: General    CORONARY ANGIOPLASTY WITH STENT PLACEMENT      FL RETROGRADE PYELOGRAM  5/21/2020    IR BIOPSY LIVER MASS  1/24/2019    IR CHOLECYSTOSTOMY TUBE PLACEMENT  9/6/2019    AK CYSTO/URETERO W/LITHOTRIPSY &INDWELL STENT INSRT Left 5/21/2020    Procedure: CYSTOSCOPY URETEROSCOPY WITH LITHOTRIPSY HOLMIUM LASER, RETROGRADE PYELOGRAM AND INSERTION STENT URETERAL;  Surgeon: Victor Hugo Willingham MD;  Location: [facility] MA[facility];  Service: Urology    AK CYSTOURETHROSCOPY,URETER CATHETER Left 4/21/2020    Procedure: CYSTOSCOPY WITH INSERTION STENT URETERAL;  Surgeon: Chad Grant MD;  Location: [facility] Main [facility];  Service: Urology        01/26/21 0813   PT Last Visit   PT Visit Date 01/26/21   Note Type   Note Type Treatment   Pain Assessment   Pain Assessment Tool 0-10   Pain Score 5   Pain Location/Orientation Orientation: Bilateral;Location: Buttocks   Pain Onset/Description Onset: Ongoing;Frequency: Intermittent; Descriptor: Aching;Descriptor: Sore   Effect of Pain on Daily Activities yes   Patient's Stated Pain Goal No pain   Hospital Pain Intervention(s) Medication (See MAR); Repositioned; Ambulation/increased activity; Elevated   Multiple Pain Sites Yes   Restrictions/Precautions   Weight Bearing Precautions Per Order No   Other Precautions Contact/isolation; Airborne/isolation;Cognitive; Bed Alarm; Fall Risk;O2;Pain;Multiple lines   General   Chart Reviewed Yes   Response to Previous Treatment Patient unable to report, no changes reported from family or staff   Family/Caregiver Present No   Cognition   Overall Cognitive Status Impaired   Arousal/Participation Responsive; Cooperative   Attention Attends with cues to redirect   Orientation Level Oriented to person;Disoriented to time;Disoriented to situation;Disoriented to place   Memory Decreased short term memory;Decreased recall of recent events;Decreased recall of precautions   Following Commands Follows one step commands with increased time or repetition   Comments Pt  agreeable to PT tx session,pleasant  Subjective   Subjective "I can sit up"   Bed Mobility   Rolling R 3  Moderate assistance   Additional items Assist x 1;Bedrails; Increased time required;Verbal cues;LE management  (>4  trials, d/t bowel incontinence x 3)   Rolling L 3  Moderate assistance   Additional items Assist x 1;Bedrails; Increased time required;Verbal cues;LE management  (>4  trials, d/t bowel incontinence x 3)   Supine to Sit 2  Maximal assistance   Additional items Assist x 1;Bedrails; Increased time required;Verbal cues;LE management   Sit to Supine 3  Moderate assistance   Additional items Assist x 1;Bedrails; Increased time required;Verbal cues;LE management   Additional Comments Sohail sat on bedside, unsupported / close S of 1  Pt  remained at EOB ~ 20 minutes w/breakfast arranged and assisted prn  Pt  was then returned BTB 2/2 fatigue and bowel incontinence  Transfers   Sit to Stand Unable to assess   Ambulation/Elevation   Gait pattern Not appropriate; Not tested   Balance   Static Sitting Fair   Dynamic Sitting Fair -   Static Standing   (unable to safely assess at this time)   Endurance Deficit   Endurance Deficit Yes   Endurance Deficit Description Fatigue & pain w/functional mobility, symptomatic resolution at rest/supine HOB elevated  Activity Tolerance   Activity Tolerance Patient limited by fatigue;Patient limited by pain; Other (Comment); Treatment limited secondary to medical complications (Comment)  (repeated bowel incontinence)   Nurse Made Aware yes, Amaris Castro RN   Assessment   Prognosis Poor   Problem List Decreased strength;Decreased endurance; Impaired balance;Decreased coordination;Decreased mobility; Decreased cognition;Decreased safety awareness;Pain   Assessment Pt seen for PT treatment session this date with interventions consisting of therapeutic activity consisting of training: bed mobility, supine<>sit transfers, static sitting tolerance at EOB for ~20 minutes w/ o UE support, vc and tactile cues for static sitting posture faciliation and feeding assistance/set-up,repeated POST/ANT pericare  Pt agreeable to PT treatment session upon arrival, pt found supine in bed w/ HOB elevated, A&O x 1  In comparison to previous session, pt with improvements in functional task advancement and duration  Post session: pt returned BTB, all needs in reach and RN notified of session findings/recommendations Continue to recommend STR at time of d/c in order to maximize pt's functional independence and safety w/ mobility   Pt continues to be functioning below baseline level, and remains limited 2* factors listed above and including weakness, pain, decreased endurance, medical complications,pain  PT will continue to see pt while here in order to address the deficits listed above and provide interventions consistent w/ POC in effort to achieve LTGs  Goals   Patient Goals to finish his cereal   LTG Expiration Date 02/04/21   Long Term Goal #1 LTGs remain appropriate   PT Treatment Day 1   Plan   Treatment/Interventions ADL retraining;Functional transfer training;LE strengthening/ROM; Therapeutic exercise; Endurance training;Patient/family training;Equipment eval/education; Bed mobility;Gait training;Spoke to nursing;OT   Progress Slow progress, medical status limitations   PT Frequency Monitor status; Other (Comment)  (3-5x/wk)   Recommendation   PT Discharge Recommendation Post-Acute Rehabilitation Services  (continued recommendation)   Equipment Recommended   (TBD)   PT - OK to Discharge No   Additional Comments Upon conclusion, pt  was resting in bed w/all needs within reach     AM-PAC Basic Mobility Inpatient   Turning in Bed Without Bedrails 2   Lying on Back to Sitting on Edge of Flat Bed 2   Moving Bed to Chair 2   Standing Up From Chair 2   Walk in Room 1   Climb 3-5 Stairs 1   Basic Mobility Inpatient Raw Score 10   Turning Head Towards Sound 3   Follow Simple Instructions 2   Low Function Basic Mobility Raw Score 15   Low Function Basic Mobility Standardized Score 23 9     Treatment Time: 8997-9840    Perfecto Boas, PT

## 2021-01-26 NOTE — ASSESSMENT & PLAN NOTE
· Likely exacerbated by COVID infection    Baseline creatinine appears to be around 4-5  · Will continue gentle hydration  · Lasix held for now  · Nephrology input appreciated  · Will monitor urine output  · Avoid any nephrotoxic meds

## 2021-01-26 NOTE — ASSESSMENT & PLAN NOTE
· Will continue treatment under mild algorithm, patient currently not requiring oxygen  · Continue remdesivir day 3  · Patient was not started on dexamethasone or plasma therapy given that he is not requiring oxygen at this time  · Will continue to monitor inflammatory markers  · Will start ceftriaxone 1 g daily  · If procalcitonin remains normal will discontinue antibiotics  · Continue supportive care

## 2021-01-26 NOTE — PROGRESS NOTES
Progress Note - Nephrology   Manav Fishman 79 y o  male MRN: [id number]  Unit/Bed#: -01 Encounter: [id number]    A/P:  1  Acute kidney injury on top of chronic kidney disease               continues to slowly improve with volume expansion, would continue current course of care  2  Chronic kidney disease stage 5 not on dialysis baseline creatinine around 4 5 mg/dL               continue to avoid potential nephrotoxins, continue manage patient's care with no plan for dialysis in the future  3  Hypertensive nephrosclerosis  4  Hypokalemia              potassium significant improved, continue closely monitor and provide supplement when indicated  5  Recurrent urinary tract infections               patient has been doing well, continue with good hygiene, patient has chronic Haile catheter  6  Urinary retention               continue Haile catheter     7  COVID-19               continue with medications according to hospitalist, patient is slowly improving  8  Diabetes mellitus type 2  9   Metastatic neuroendocrine carcinoma      Follow up reason for today's visit:  Acute kidney injury/chronic kidney disease/hypertension/electrolyte disorders    COVID-19 virus infection    Patient Active Problem List   Diagnosis    Type 2 diabetes mellitus with hyperglycemia, with long-term current use of insulin ([address] )    Essential hypertension    Mixed hyperlipidemia    Iron deficiency anemia secondary to inadequate dietary iron intake    Syncope and collapse    Liver mass    Neuroendocrine tumor    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)    Pressure injury of right heel, unstageable (HCC)    Atherosclerosis of artery of extremity with ulceration (HCC)    Carcinoid syndrome ([address] )    Urinary catheter in place    Malignant neuroendocrine neoplasm ([address] )    Abdominal pain    UTI due to extended-spectrum beta lactamase (ESBL) producing Escherichia coli    Hypomagnesemia    Normocytic anemia    Goals of care, counseling/discussion    Hyperparathyroid bone disease ([place] [id number] )    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    Sepsis ([place] [id number] )    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    Low TSH level    Severe protein-calorie malnutrition (HCC)    Chronic Diastolic congestive heart failure    Premature atrial complexes    Bradycardia    Generalized weakness    Recurrent UTI    Gastroesophageal reflux disease without esophagitis    Ureterolithiasis    Obstructive uropathy    Gait difficulty    CAD (coronary artery disease)    Azotemia    Acidosis    Hypertensive kidney disease with stage 5 chronic kidney disease, not on chronic dialysis ([facility])    Edema    Chest pain    Chest pressure    Urinary retention    Elevated d-dimer    Esophageal dysphagia    Dizziness    Anemia due to chronic kidney disease    CKD (chronic kidney disease) stage 5, GFR less than 15 ml/min ([facility])    Syncope    SIRS (systemic inflammatory response syndrome) ([facility])    Acute renal failure superimposed on stage 5 chronic kidney disease, not on chronic dialysis ([place] [id number] )    COVID-19 virus infection         Subjective:   Patient stable with no specific complaints at this time  He is feeling slightly improved although does not feel there has been significant improvement in last 24 hours  Objective:     Vitals: Blood pressure 124/70, pulse 91, temperature 97 5 °F (36 4 °C), temperature source Temporal, resp  rate (!) 26, height 5' 8" (1 727 m), weight 70 1 kg (154 lb 8 7 oz), SpO2 94 %  ,Body mass index is 23 5 kg/m²  Weight (last 2 days)     Date/Time   Weight    01/24/21 2051   70 1 (154 54)    01/24/21 1631   72 1 (159)                Intake/Output Summary (Last 24 hours) at 1/26/2021 1154  Last data filed at 1/26/2021 0522  Gross per 24 hour   Intake 120 ml   Output 1700 ml   Net -1580 ml     I/O last 3 completed shifts: In: 360 [P O :360]  Out: 2050 [Urine:2050]    Urethral Catheter Double-lumen 16 Fr   (Active)       Urethral Catheter (Active) Reasons to continue Urinary Catheter  Chronic urinary catheter 01/25/21 0933   Goal for Removal N/A- chronic garcia 01/25/21 0933   Site Assessment Clean;Skin intact 01/25/21 0933   Collection Container Capped 01/25/21 0933   Securement Method Securing device (Describe) 01/25/21 0933   Output (mL) 550 mL 01/26/21 0522       Physical Exam: /70 (BP Location: Right arm)   Pulse 91   Temp 97 5 °F (36 4 °C) (Temporal)   Resp (!) 26   Ht 5' 8" (1 727 m)   Wt 70 1 kg (154 lb 8 7 oz)   SpO2 94%   BMI 23 50 kg/m²     General Appearance:    Alert, cooperative, no distress, appears stated age   Head:    Normocephalic, without obvious abnormality, atraumatic   Eyes:    Conjunctiva/corneas clear   Ears:    Normal external ears   Nose:   Nares normal, septum midline, mucosa normal, no drainage    or sinus tenderness   Throat:   Lips, mucosa, and tongue normal; teeth and gums normal   Neck:   Supple   Back:     Symmetric, no curvature, ROM normal, no CVA tenderness   Lungs:     Clear to auscultation bilaterally, respirations unlabored   Chest wall:    No tenderness or deformity   Heart:    Regular rate and rhythm, S1 and S2 normal, no murmur, rub   or gallop   Abdomen:     Soft, non-tender, bowel sounds active   Extremities:   Extremities normal, atraumatic, no cyanosis or edema   Skin:   Skin color, texture, turgor normal, no rashes or lesions   Lymph nodes:   Cervical normal   Neurologic:   CNII-XII intact            Lab, Imaging and other studies: I have personally reviewed pertinent labs    CBC:   Lab Results   Component Value Date    WBC 4 40 (L) 01/26/2021    HGB 9 7 (L) 01/26/2021    HCT 29 3 (L) 01/26/2021    MCV 91 01/26/2021     (L) 01/26/2021    MCH 30 1 01/26/2021    MCHC 33 1 01/26/2021    RDW 14 1 01/26/2021    MPV 8 3 (L) 01/26/2021     CMP:   Lab Results   Component Value Date    K 4 0 01/26/2021     01/26/2021    CO2 20 (L) 01/26/2021    BUN 52 (H) 01/26/2021    CREATININE 5 84 (H) 01/26/2021    CALCIUM 7 8 (L) 01/26/2021    AST 15 01/26/2021    ALT 7 01/26/2021    ALKPHOS 74 01/26/2021    EGFR 8 01/26/2021         Results from last 7 days   Lab Units 01/26/21  0520 01/25/21  0449 01/24/21  1800   POTASSIUM mmol/L 4 0 3 3* 3 6   CHLORIDE mmol/L 107 106 100   CO2 mmol/L 20* 26 25   BUN mg/dL 52* 52* 53*   CREATININE mg/dL 5 84* 6 27* 6 34*   CALCIUM mg/dL 7 8* 7 9* 8 2*   ALK PHOS U/L 74 69 77   ALT U/L 7 8 8   AST U/L 15 16 14         Phosphorus: No results found for: PHOS  Magnesium: No results found for: MG  Urinalysis: No results found for: COLORU, CLARITYU, SPECGRAV, PHUR, LEUKOCYTESUR, NITRITE, PROTEINUA, GLUCOSEU, KETONESU, BILIRUBINUR, BLOODU  Ionized Calcium: No results found for: CAION  Coagulation: No results found for: PT, INR, APTT  Troponin: No results found for: TROPONINI  ABG: No results found for: PHART, PQD0EUM, PO2ART, GPV4HLD, U4GQKLRD, BEART, SOURCE  Radiology review:     IMAGING  Procedure: Ct Chest Abdomen Pelvis Wo Contrast    Result Date: 1/24/2021  Narrative: CT CHEST, ABDOMEN AND PELVIS WITHOUT IV CONTRAST INDICATION:   Progressive weakness fatigue history of liver cancer  Abdominal guarding on exam  Patient has suspected COVID-19  COMPARISON:  CT abdomen pelvis 12/14/2020, 9/30/2020 TECHNIQUE: CT examination of the chest, abdomen and pelvis was performed without intravenous contrast   Axial, sagittal, and coronal 2D reformatted images were created from the source data and submitted for interpretation  Radiation dose length product (DLP) for this visit:  625 2 mGy-cm   This examination, like all CT scans performed in the [facility], was performed utilizing techniques to minimize radiation dose exposure, including the use of iterative reconstruction and automated exposure control  Enteric contrast was not administered  FINDINGS: CHEST LUNGS:  Scattered predominantly peripheral groundglass opacities now identified   In the setting of clinically suspected/proven COVID-19, the above lung parenchymal findings on CT indicate high confidence level for COVID-19  Bibasilar compressive atelectasis  No tracheal or endobronchial lesions  PLEURA:  Bilateral pleural effusions, right greater than left  HEART/GREAT VESSELS:  Stable fusiform dilatation of the ascending thoracic aorta measures 4 5 cm  Calcified native coronary vessels  MEDIASTINUM AND NEAL:  Unremarkable  CHEST WALL AND LOWER NECK:   Unremarkable  ABDOMEN LIVER/BILIARY TREE:  Unremarkable  Previously described hypodensities inconspicuous on this nonenhanced study  No biliary ductal dilatation  GALLBLADDER:  Gallbladder is surgically absent  SPLEEN:  Unremarkable  PANCREAS:  Unremarkable  ADRENAL GLANDS:  Unremarkable  KIDNEYS/URETERS:  Unremarkable  No hydronephrosis  STOMACH AND BOWEL:  Unremarkable  APPENDIX:  No findings to suggest appendicitis  ABDOMINOPELVIC CAVITY:  Partially calcified mesenteric mass in the mid abdomen unchanged  No pneumoperitoneum or abdominal pelvic lymphadenopathy  Trace upper abdominal ascites  Scattered soft tissue implants throughout the omentum unchanged best seen  on series 2, image 86 consistent with carcinomatosis  VESSELS:  Atherosclerotic changes are present  No evidence of aneurysm  PELVIS REPRODUCTIVE ORGANS:  The prostate is enlarged  URINARY BLADDER:  Collapsed catheterized bladder with no significant perivesical inflammation  ABDOMINAL WALL/INGUINAL REGIONS:  Unremarkable  OSSEOUS STRUCTURES:  No acute fracture or destructive osseous lesion  Impression: 1  Bilateral groundglass opacities in a peripheral distribution concerning for multifocal Covid-19 pneumonia  2   Bilateral pleural effusions, right greater than left  3   Trace abdominal ascites with stable omental implants inserted for carcinomatosis  4   Stable partially calcified mesenteric mass   Workstation performed: [id number]     Procedure: Ct Head Without Contrast    Result Date: 1/24/2021  Narrative: CT BRAIN - WITHOUT CONTRAST INDICATION:   Change in mental status  Weakness - Generalized  hx of liver ca w/ mets Patient has suspected COVID-19  COMPARISON:  12/14/2020; 9/21/2020 TECHNIQUE:  CT examination of the brain was performed  In addition to axial images, sagittal and coronal 2D reformatted images were created and submitted for interpretation  Radiation dose length product (DLP) for this visit:  734 8 mGy-cm   This examination, like all CT scans performed in the [facility], was performed utilizing techniques to minimize radiation dose exposure, including the use of iterative reconstruction and automated exposure control  IMAGE QUALITY:  Diagnostic  FINDINGS: PARENCHYMA: Decreased attenuation is noted in periventricular and subcortical white matter demonstrating an appearance that is statistically most likely to represent mild microangiopathic change  Atherosclerotic calcifications noted  No CT signs of acute infarction  No intracranial mass, mass effect or midline shift  No acute parenchymal hemorrhage  VENTRICLES AND EXTRA-AXIAL SPACES:  Normal for the patient's age  VISUALIZED ORBITS AND PARANASAL SINUSES:  Unremarkable  CALVARIUM AND EXTRACRANIAL SOFT TISSUES:  Normal      Impression: No acute intracranial hemorrhage, mass effect or edema  Microangiopathic changes   Workstation performed: [id number]       Current Facility-Administered Medications   Medication Dose Route Frequency    acetaminophen (TYLENOL) tablet 650 mg  650 mg Oral Q6H PRN    ascorbic acid (VITAMIN C) tablet 1,000 mg  1,000 mg Oral Q12H NATACHA    aspirin (ECOTRIN LOW STRENGTH) EC tablet 81 mg  81 mg Oral Daily    b complex-vitamin C-folic acid (NEPHROCAPS) capsule 1 capsule  1 capsule Oral Daily With Dinner    cefTRIAXone (ROCEPHIN) IVPB (premix in dextrose) 1,000 mg 50 mL  1,000 mg Intravenous Q24H    cholecalciferol (VITAMIN D3) tablet 2,000 Units  2,000 Units Oral Daily    colchicine (COLCRYS) tablet 0 3 mg  0 3 mg Oral Daily    cyanocobalamin (VITAMIN B-12) tablet 100 mcg  100 mcg Oral Daily    famotidine (PEPCID) injection 20 mg  20 mg Intravenous Q24H [address]    heparin (porcine) subcutaneous injection 5,000 Units  5,000 Units Subcutaneous Q8H [address]    HYDROmorphone (DILAUDID) injection 0 2 mg  0 2 mg Intravenous Q4H PRN    insulin lispro (HumaLOG) 100 units/mL subcutaneous injection 1-6 Units  1-6 Units Subcutaneous 4x Daily (AC & HS)    magnesium oxide (MAG-OX) tablet 200 mg  200 mg Oral BID    zinc sulfate (ZINCATE) capsule 220 mg  220 mg Oral Daily    Followed by   Yanick Tipton ON 2/1/2021] multivitamin-minerals (CENTRUM) tablet 1 tablet  1 tablet Oral Daily    NIFEdipine (PROCARDIA XL) 24 hr tablet 60 mg  60 mg Oral Daily    ondansetron (ZOFRAN) injection 4 mg  4 mg Intravenous Q4H PRN    remdesivir (Veklury) 100 mg in sodium chloride 0 9 % 250 mL IVPB  100 mg Intravenous Q24H    sodium bicarbonate tablet 325 mg  325 mg Oral BID    tamsulosin (FLOMAX) capsule 0 4 mg  0 4 mg Oral Daily With Dinner    vancomycin (VANCOCIN) oral solution 125 mg  125 mg Oral Q12H [address]     Medications Discontinued During This Encounter   Medication Reason    BD INSULIN SYRINGE U/F 30G X 1/2" 0 3 ML MISC Error    insulin detemir (Levemir) 100 units/mL subcutaneous injection Error    insulin lispro (HumaLOG) 100 units/mL injection Error    Insulin Syringe-Needle U-100 30G X 1/2" 1 ML MISC Error    meclizine (ANTIVERT) 25 mg tablet Error    ofloxacin (OCUFLOX) 0 3 % ophthalmic solution Error    prednisoLONE acetate (PRED FORTE) 1 % ophthalmic suspension Error    insulin detemir (LEVEMIR) subcutaneous injection 5 Units        Erasto Berumen DO      This progress note was produced in part using a dictation device which may document imprecise wording from author's original intent

## 2021-01-26 NOTE — PLAN OF CARE
Problem: PHYSICAL THERAPY ADULT  Goal: Performs mobility at highest level of function for planned discharge setting  See evaluation for individualized goals  Description: Treatment/Interventions: ADL retraining, Functional transfer training, LE strengthening/ROM, Therapeutic exercise, Endurance training, Cognitive reorientation, Patient/family training, Bed mobility, Continued evaluation, Spoke to nursing, OT  Equipment Recommended: (TBD)       See flowsheet documentation for full assessment, interventions and recommendations  Outcome: Progressing  Note: Prognosis: Poor  Problem List: Decreased strength, Decreased endurance, Impaired balance, Decreased coordination, Decreased mobility, Decreased cognition, Decreased safety awareness, Pain  Assessment: Pt seen for PT treatment session this date with interventions consisting of therapeutic activity consisting of training: bed mobility, supine<>sit transfers, static sitting tolerance at EOB for ~20 minutes w/ o UE support, vc and tactile cues for static sitting posture faciliation and feeding assistance/set-up,repeated POST/ANT pericare  Pt agreeable to PT treatment session upon arrival, pt found supine in bed w/ HOB elevated, A&O x 1  In comparison to previous session, pt with improvements in functional task advancement and duration  Post session: pt returned BTB, all needs in reach and RN notified of session findings/recommendations Continue to recommend STR at time of d/c in order to maximize pt's functional independence and safety w/ mobility  Pt continues to be functioning below baseline level, and remains limited 2* factors listed above and including weakness, pain, decreased endurance, medical complications,pain  PT will continue to see pt while here in order to address the deficits listed above and provide interventions consistent w/ POC in effort to achieve LTGs          PT Discharge Recommendation: (S) Post-Acute Rehabilitation Services(continued recommendation)     PT - OK to Discharge: No    See flowsheet documentation for full assessment

## 2021-01-26 NOTE — ASSESSMENT & PLAN NOTE
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

## 2021-01-26 NOTE — PROGRESS NOTES
Progress Note - Sushil Pedroza 1940, 79 y o  male MRN: [id number]    Unit/Bed#: -01 Encounter: [id number]    Primary Care Provider: RIKA York   Date and time admitted to hospital: 1/24/2021  4:30 PM        * Pneumonia due to COVID-19 virus  Assessment & Plan  · Will continue treatment under mild algorithm, patient currently not requiring oxygen  · Continue remdesivir day 3  · Patient was not started on dexamethasone or plasma therapy given that he is not requiring oxygen at this time  · Will continue to monitor inflammatory markers  · Will start ceftriaxone 1 g daily  · If procalcitonin remains normal will discontinue antibiotics  · Continue supportive care    Acute renal failure superimposed on stage 5 chronic kidney disease, not on chronic dialysis [facility])  Assessment & Plan  · Likely exacerbated by COVID infection  Baseline creatinine appears to be around 4-5  · Will continue gentle hydration  · Lasix held for now  · Nephrology input appreciated  · Will monitor urine output  · Avoid any nephrotoxic meds    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 [facility])  Assessment & Plan  · 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  · Follows with Dr Mira Waggoner, on monthly injectable    Essential hypertension  Assessment & Plan  · BP stable  · Continue nifedipine    Type 2 diabetes mellitus with hyperglycemia, with long-term current use of insulin (HCC)  Assessment & Plan  · No further hypoglycemia noted  · Will continue to hold Levemir  · Continue insulin sliding scale  · Monitor for hypoglycemia      VTE Prophylaxis:  Heparin    Patient Centered Rounds: I have performed bedside rounds with nursing staff today      Discussions with Specialists or Other Care Team Provider: yes  Education and Discussions with Family / Patient:  Attempted to call patient's significant other at number provided in chart with no answer    Current Length of Stay: 2 day(s)    Current Patient Status: Inpatient   Certification Statement: The patient will continue to require additional inpatient hospital stay due to COVID infection    Discharge Plan:  Pending hospital course    Code Status: Level 1 - Full Code    Subjective:   No overnight events noted    Objective:     Vitals:   Temp (24hrs), Av 6 °F (36 4 °C), Min:96 8 °F (36 °C), Max:98 4 °F (36 9 °C)    Temp:  [96 8 °F (36 °C)-98 4 °F (36 9 °C)] 96 8 °F (36 °C)  HR:  [82-91] 82  Resp:  [22-26] 23  BP: (124-138)/(64-78) 136/78  SpO2:  [94 %-96 %] 96 %  Body mass index is 23 5 kg/m²  Input and Output Summary (last 24 hours): Intake/Output Summary (Last 24 hours) at 2021 1701  Last data filed at 2021 0522  Gross per 24 hour   Intake --   Output 1100 ml   Net -1100 ml       Physical Exam:   Physical Exam  Constitutional:       General: He is not in acute distress  Comments: Frail elderly male   HENT:      Head: Normocephalic and atraumatic  Nose: Nose normal       Mouth/Throat:      Mouth: Mucous membranes are moist    Eyes:      Extraocular Movements: Extraocular movements intact  Conjunctiva/sclera: Conjunctivae normal    Neck:      Musculoskeletal: Normal range of motion and neck supple  Cardiovascular:      Rate and Rhythm: Normal rate and regular rhythm  Pulmonary:      Effort: Pulmonary effort is normal  No respiratory distress  Abdominal:      Palpations: Abdomen is soft  Tenderness: There is no abdominal tenderness  Musculoskeletal: Normal range of motion  General: No swelling  Skin:     General: Skin is warm and dry  Neurological:      Comments: Patient awake and alert    Follows simple commands   Psychiatric:         Mood and Affect: Mood normal          Behavior: Behavior normal          Additional Data:     Labs:    Results from last 7 days   Lab Units 21  0520   WBC Thousand/uL 4 40*   HEMOGLOBIN g/dL 9 7*   HEMATOCRIT % 29 3*   PLATELETS Thousands/uL 138*   NEUTROS PCT % 64   LYMPHS PCT % 23   MONOS PCT % 12 EOS PCT % 0     Results from last 7 days   Lab Units 01/26/21  0520   SODIUM mmol/L 141   POTASSIUM mmol/L 4 0   CHLORIDE mmol/L 107   CO2 mmol/L 20*   BUN mg/dL 52*   CREATININE mg/dL 5 84*   CALCIUM mg/dL 7 8*   ALK PHOS U/L 74   ALT U/L 7   AST U/L 15     Results from last 7 days   Lab Units 01/24/21  1800   INR  1 08     Results from last 7 days   Lab Units 01/26/21  1616 01/26/21  1140 01/26/21  0512 01/25/21  2044 01/25/21  1606 01/25/21  1227 01/25/21  1145 01/25/21  0507 01/24/21  2046 01/24/21  1808   POC GLUCOSE mg/dl 178* 202* 156* 211* 183* 129 55* 89 163* 215*           * I Have Reviewed All Lab Data Listed Above  * Additional Pertinent Lab Tests Reviewed:  L[facility] Admission  Reviewed    Imaging:  Imaging Reports Reviewed Today Include:  No new imaging    Recent Cultures (last 7 days):     Results from last 7 days   Lab Units 01/25/21  0537   URINE CULTURE  No Growth <1000 cfu/mL       Last 24 Hours Medication List:   Current Facility-Administered Medications   Medication Dose Route Frequency Provider Last Rate    acetaminophen  650 mg Oral Q6H PRN Mirian Raygoza DO      ascorbic acid  1,000 mg Oral Q12H [facility] Ady Suggs DO      aspirin  81 mg Oral Daily Mirian Raygoza DO      b complex-vitamin C-folic acid  1 capsule Oral Daily With Tus reQRdos, DO      cefTRIAXone  1,000 mg Intravenous Q24H Rei Tena MD 1,000 mg (01/26/21 1318)    cholecalciferol  2,000 Units Oral Daily Mirian Raygoza DO      colchicine  0 3 mg Oral Daily Mirian Raygoza DO      vitamin B-12  100 mcg Oral Daily Ady Suggs DO      famotidine  20 mg Intravenous Q24H [facility] Ady Suggs DO      heparin (porcine)  5,000 Units Subcutaneous Q8H [facility] Ady Suggs DO      HYDROmorphone  0 2 mg Intravenous Q4H PRN Mirian Raygoza DO      insulin lispro  1-6 Units Subcutaneous 4x Daily (AC & HS) Ady Ifeanyi, DO      magnesium oxide  200 mg Oral BID Mirian Raygoza, DO      zinc sulfate 220 mg Oral Daily Brandy jackson, DO      Followed by   Jerral Kocher ON 2/1/2021] multivitamin-minerals  1 tablet Oral Daily Ady Suggs, DO      NIFEdipine  60 mg Oral Daily Ady Suggs, DO      ondansetron  4 mg Intravenous Q4H PRN Brandy jackson, DO      remdesivir  100 mg Intravenous Q24H Ady Suggs, DO      sodium bicarbonate  325 mg Oral BID Brandy jackson, DO      tamsulosin  0 4 mg Oral Daily With [employer], DO      vancomycin  125 mg Oral Q12H Ml Michelle MD          Today, Patient Was Seen By: Avery Manoz MD    ** Please Note: Dictation voice to text software may have been used in the creation of this document   **

## 2021-01-26 NOTE — ASSESSMENT & PLAN NOTE
· No further hypoglycemia noted  · Will continue to hold Levemir  · Continue insulin sliding scale  · Monitor for hypoglycemia

## 2021-01-26 NOTE — CASE MANAGEMENT
TC to pt SO Yahaira Ravi to discuss STR  PT is recommend STR  SO states she is able to take care of him and he will not agree to STR  SO states he is active with [facility]  SO is on quarantine d/t COVID  States her grandson would most likely be able to transport pt home upon discharge

## 2021-01-27 NOTE — PLAN OF CARE
Problem: Prexisting or High Potential for Compromised Skin Integrity  Goal: Skin integrity is maintained or improved  Description: INTERVENTIONS:  - Identify patients at risk for skin breakdown  - Assess and monitor skin integrity  - Assess and monitor nutrition and hydration status  - Monitor labs   - Assess for incontinence   - Turn and reposition patient  - Assist with mobility/ambulation  - Relieve pressure over bony prominences  - Avoid friction and shearing  - Provide appropriate hygiene as needed including keeping skin clean and dry  - Evaluate need for skin moisturizer/barrier cream  - Collaborate with interdisciplinary team   - Patient/family teaching  - Consider wound care consult   Outcome: Progressing     Problem: PAIN - ADULT  Goal: Verbalizes/displays adequate comfort level or baseline comfort level  Description: Interventions:  - Encourage patient to monitor pain and request assistance  - Assess pain using appropriate pain scale  - Administer analgesics based on type and severity of pain and evaluate response  - Implement non-pharmacological measures as appropriate and evaluate response  - Consider cultural and social influences on pain and pain management  - Notify physician/advanced practitioner if interventions unsuccessful or patient reports new pain  Outcome: Progressing     Problem: SAFETY ADULT  Goal: Patient will remain free of falls  Description: INTERVENTIONS:  - Assess patient frequently for physical needs  -  Identify cognitive and physical deficits and behaviors that affect risk of falls    -  [place] fall precautions as indicated by assessment   - Educate patient/family on patient safety including physical limitations  - Instruct patient to call for assistance with activity based on assessment  - Modify environment to reduce risk of injury  - Consider OT/PT consult to assist with strengthening/mobility  Outcome: Progressing  Goal: Maintain or return to baseline ADL function  Description: INTERVENTIONS:  -  Assess patient's ability to carry out ADLs; assess patient's baseline for ADL function and identify physical deficits which impact ability to perform ADLs (bathing, care of mouth/teeth, toileting, grooming, dressing, etc )  - Assess/evaluate cause of self-care deficits   - Assess range of motion  - Assess patient's mobility; develop plan if impaired  - Assess patient's need for assistive devices and provide as appropriate  - Encourage maximum independence but intervene and supervise when necessary  - Involve family in performance of ADLs  - Assess for home care needs following discharge   - Consider OT consult to assist with ADL evaluation and planning for discharge  - Provide patient education as appropriate  Outcome: Progressing  Goal: Maintain or return mobility status to optimal level  Description: INTERVENTIONS:  - Assess patient's baseline mobility status (ambulation, transfers, stairs, etc )    - Identify cognitive and physical deficits and behaviors that affect mobility  - Identify mobility aids required to assist with transfers and/or ambulation (gait belt, sit-to-stand, lift, walker, cane, etc )  - [place] fall precautions as indicated by assessment  - Record patient progress and toleration of activity level on Mobility SBAR; progress patient to next Phase/Stage  - Instruct patient to call for assistance with activity based on assessment  - Consider rehabilitation consult to assist with strengthening/weightbearing, etc   Outcome: Progressing     Problem: DISCHARGE PLANNING  Goal: Discharge to home or other facility with appropriate resources  Description: INTERVENTIONS:  - Identify barriers to discharge w/patient and caregiver  - Arrange for needed discharge resources and transportation as appropriate  - Identify discharge learning needs (meds, wound care, etc )  - Arrange for interpretive services to assist at discharge as needed  - Refer to Case Management Department for coordinating discharge planning if the patient needs post-hospital services based on physician/advanced practitioner order or complex needs related to functional status, cognitive ability, or social support system  Outcome: Progressing     Problem: Knowledge Deficit  Goal: Patient/family/caregiver demonstrates understanding of disease process, treatment plan, medications, and discharge instructions  Description: Complete learning assessment and assess knowledge base    Interventions:  - Provide teaching at level of understanding  - Provide teaching via preferred learning methods  Outcome: Progressing     Problem: RESPIRATORY - ADULT  Goal: Achieves optimal ventilation and oxygenation  Description: INTERVENTIONS:  - Assess for changes in respiratory status  - Assess for changes in mentation and behavior  - Position to facilitate oxygenation and minimize respiratory effort  - Oxygen administered by appropriate delivery if ordered  - Initiate smoking cessation education as indicated  - Encourage broncho-pulmonary hygiene including cough, deep breathe, Incentive Spirometry  - Assess the need for suctioning and aspirate as needed  - Assess and instruct to report SOB or any respiratory difficulty  - Respiratory Therapy support as indicated  Outcome: Progressing     Problem: GENITOURINARY - ADULT  Goal: Maintains or returns to baseline urinary function  Description: INTERVENTIONS:  - Assess urinary function  - Encourage oral fluids to ensure adequate hydration if ordered  - Administer IV fluids as ordered to ensure adequate hydration  - Administer ordered medications as needed  - Offer frequent toileting  - Follow urinary retention protocol if ordered  Outcome: Progressing  Goal: Urinary catheter remains patent  Description: INTERVENTIONS:  - Assess patency of urinary catheter  - If patient has a chronic garcia, consider changing catheter if non-functioning  - Follow guidelines for intermittent irrigation of non-functioning urinary catheter  Outcome: Progressing     Problem: Potential for Falls  Goal: Patient will remain free of falls  Description: INTERVENTIONS:  - Assess patient frequently for physical needs  -  Identify cognitive and physical deficits and behaviors that affect risk of falls    -  [place] fall precautions as indicated by assessment   - Educate patient/family on patient safety including physical limitations  - Instruct patient to call for assistance with activity based on assessment  - Modify environment to reduce risk of injury  - Consider OT/PT consult to assist with strengthening/mobility  Outcome: Progressing

## 2021-01-27 NOTE — ASSESSMENT & PLAN NOTE
· Will continue treatment under mild algorithm, patient currently not requiring oxygen  · Continue remdesivir day 4  · Patient was not started on dexamethasone or plasma therapy given that he is not requiring oxygen at this time  · Will continue to monitor inflammatory markers  · Continue ceftriaxone  · Continue supportive care

## 2021-01-27 NOTE — ASSESSMENT & PLAN NOTE
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

## 2021-01-27 NOTE — PROGRESS NOTES
NEPHROLOGY PROGRESS NOTE   Kalia Pascual 79 y o  male MRN: [id number]  Unit/Bed#: -01 Encounter: [id number]    Assessment/Plan:    In summary, this is a 80 yo well-known to this service with CKD 5 medically managed without dialysis who was admitted 1/24 for worsening generalized weakness found to have COVID-19  Renal consulted for acute kidney injury  1  Acute kidney injury (POA) atop chronic kidney disease  · Presented with a creatinine of 6 34 mg/dL -> 5 65 mg/dL today  Etiology likely ATN in the setting of COVID-19 associated pneumonia  · Renal function is slowly improving  Fortunately patient is without symptoms of uremia  Patient does not wish for renal replacement therapy of any form  · He is not eating and drinking well and is still having loose frequent stools per nursing staff  Will provide patient with 1 L balanced salt solution today  · Continue to avoid nephrotoxins, maximize hemodynamics, provide supportive care  · Remains on colchicine at this time  May need to switch to every other day or discontinue altogether  2  Stage 5 chronic kidney disease with baseline creatinine around 4 5 mg/dL  3  Hypertensive nephrosclerosis  · Blood pressure is appropriate  He is not on an Ace or an Arb in outpatient due to declining GFR  Maintain MAP > 65 mmHg to prevent renal hypoperfusion  4  Hypokalemia  · Will provide 20 mEq oral potassium today  5  Urinary retention  · Continue chronic indwelling urinary catheter  6  Type 2 diabetes mellitus  7  Metastatic neuroendocrine carcinoma  8  Pneumonia associated with COVID-19  · Management per primary team   Liberated from oxygen    I personally updated the patient's son, Rakan Clark, today  All questions and concerns were addressed  ROS  No physical complaints on exam  Is having diarrhea and not eating drinking well  A complete 10 point review of systems have been performed and are otherwise negative         Historical Information   Past Medical History:   Diagnosis Date    Acute pancreatitis without infection or necrosis 9/26/2019    Age-related nuclear cataract, right 11/5/2020    Anemia of chronic renal failure     BPH (benign prostatic hyperplasia)     CKD (chronic kidney disease) stage 5, GFR less than 15 ml/min (HCC)     Coronary artery disease     DJD (degenerative joint disease)     Essential hypertension     Gait disturbance     Generalized weakness     GERD (gastroesophageal reflux disease)     Gout     History of transfusion     Hydronephrosis     Hyperlipidemia     Hypertension     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)     Pressure injury of skin     Proteinuria     Thrombophlebitis migrans     Type 2 diabetes mellitus      Past Surgical History:   Procedure Laterality Date    CATARACT EXTRACTION Right 11/05/2020    CATARACT EXTRACTION W/ INTRAOCULAR LENS IMPLANT Right 11/5/2020    Procedure: EYE OD CATARACT EXTRACTION WITH IOL INSERTION;  Surgeon: Chris Jose MD;  Location: [address] [place] [address] OR;  Service: Ophthalmology    CHOLECYSTECTOMY     [address] [place] [address] N/A 2/7/2020    Procedure: CHOLECYSTECTOMY LAPAROSCOPIC;  Surgeon:  Maddy Avalos MD;  Location: [address] [place] [address] OR;  Service: General    CORONARY ANGIOPLASTY WITH STENT PLACEMENT      FL RETROGRADE PYELOGRAM  5/21/2020    IR BIOPSY LIVER MASS  1/24/2019    IR CHOLECYSTOSTOMY TUBE PLACEMENT  9/6/2019    ND CYSTO/URETERO W/LITHOTRIPSY &INDWELL STENT INSRT Left 5/21/2020    Procedure: CYSTOSCOPY URETEROSCOPY WITH LITHOTRIPSY HOLMIUM LASER, RETROGRADE PYELOGRAM AND INSERTION STENT URETERAL;  Surgeon: Viviana Ledesma MD;  Location: [facility];  Service: Urology    ND CYSTOURETHROSCOPY,URETER CATHETER Left 4/21/2020    Procedure: CYSTOSCOPY WITH INSERTION STENT URETERAL;  Surgeon: Lynn Parsons MD;  [facility];  Service: Urology     Social History   Social History     Substance and Sexual Activity   Alcohol Use Never    Alcohol/week: 0 0 standard drinks    Frequency: Never     Social History     Substance and Sexual Activity   Drug Use Never     Social History     Tobacco Use   Smoking Status Never Smoker   Smokeless Tobacco Never Used       Family History:   Family History   Problem Relation Age of Onset    Cancer Mother     Hypertension Father     Cancer Brother     Cancer Maternal Grandmother     Cancer Paternal Aunt        Medications:  Pertinent medications were reviewed  Current Facility-Administered Medications   Medication Dose Route Frequency Provider Last Rate    acetaminophen  650 mg Oral Q6H PRN William Rodriges, DO      ascorbic acid  1,000 mg Oral Q12H [address] Ady Suggs DO      aspirin  81 mg Oral Daily Ady Suggs DO      b complex-vitamin C-folic acid  1 capsule Oral Daily With [employer], DO      cefTRIAXone  1,000 mg Intravenous Q24H June Bartlett MD 1,000 mg (01/26/21 1318)    cholecalciferol  2,000 Units Oral Daily William Rodriges, DO      colchicine  0 3 mg Oral Daily William Rodriges,       vitamin B-12  100 mcg Oral Daily Ady Suggs DO      famotidine  20 mg Intravenous Q24H [address] Ady Suggs DO      heparin (porcine)  5,000 Units Subcutaneous Q8H [address] Ady Suggs DO      HYDROmorphone  0 2 mg Intravenous Q4H PRN William Rodriges DO      insulin lispro  1-6 Units Subcutaneous 4x Daily (AC & HS) Ady Suggs DO      magnesium oxide  200 mg Oral BID William Rodriges,       zinc sulfate  220 mg Oral Daily Ady Suggs DO      Followed by   Teo Larueano ON 2/1/2021] multivitamin-minerals  1 tablet Oral Daily Ady Suggs DO      NIFEdipine  60 mg Oral Daily Ady Suggs DO      ondansetron  4 mg Intravenous Q4H PRN William Rodriges,       remdesivir  100 mg Intravenous Q24H Ady Suggs  mg (01/26/21 2012)    sodium bicarbonate  325 mg Oral BID William Rodriges, DO      tamsulosin  0 4 mg Oral Daily With [employer], DO      vancomycin  125 mg Oral Q12H [address], MD           No Known Allergies      Vitals:   /73 (BP Location: Right arm)   Pulse 80   Temp 99 4 °F (37 4 °C) (Temporal)   Resp (!) 26   Ht 5' 8" (1 727 m)   Wt 70 1 kg (154 lb 8 7 oz)   SpO2 94%   BMI 23 50 kg/m²   Body mass index is 23 5 kg/m²  SpO2: 94 %,   SpO2 Activity: At Rest,   O2 Device: None (Room air)      Intake/Output Summary (Last 24 hours) at 1/27/2021 1213  Last data filed at 1/27/2021 0700  Gross per 24 hour   Intake 120 ml   Output 2925 ml   Net -2805 ml     Invasive Devices     Peripheral Intravenous Line            Peripheral IV 01/24/21 Distal;Left;Upper;Ventral (anterior) Arm 3 days          Drain            Urethral Catheter -- days    Ureteral Drain/Stent Left ureter 6 Fr  250 days    Urethral Catheter Double-lumen 16 Fr  216 days                Physical Exam  General: conscious, cooperative, in no acute distress, chronically ill appearing   Eyes: conjunctivae pink, anicteric sclerae  ENT: lips and mucous membranes moist  Neck: supple, no JVD, no masses  Chest: diminished breath sounds bilateral, no crackles, ronchus or wheezings  CVS: S1 & S2, normal rate, regular rhythm  Abdomen: soft, non-tender, non-distended, normoactive bowel sounds  Extremities: no edema of both legs  Skin: no rash  Neuro: awake, alert, oriented      Diagnostic Data:  Lab: I have personally reviewed pertinent lab results  ,   CBC:  Results from last 7 days   Lab Units 01/27/21  0539   WBC Thousand/uL 5 90   HEMOGLOBIN g/dL 10 0*   HEMATOCRIT % 29 9*   PLATELETS Thousands/uL 165      CMP:   Lab Results   Component Value Date    SODIUM 142 01/27/2021    K 3 5 01/27/2021     01/27/2021    CO2 21 01/27/2021    BUN 52 (H) 01/27/2021    CREATININE 5 65 (H) 01/27/2021    CALCIUM 8 3 (L) 01/27/2021    AST 15 01/27/2021    ALT 9 01/27/2021    ALKPHOS 76 01/27/2021    EGFR 9 01/27/2021   ,   PT/INR: No results found for: PT, INR,   Magnesium: No components found for: MAG,  Phosphorous: No results found for: PHOS    Microbiology:  @[facility],(urinecx:7)@        RIKA Chopra    Portions of the record may have been created with voice recognition software  Occasional wrong word or "sound a like" substitutions may have occurred due to the inherent limitations of voice recognition software  Read the chart carefully and recognize, using context, where substitutions have occurred

## 2021-01-27 NOTE — ASSESSMENT & PLAN NOTE
· Likely exacerbated by COVID infection    Baseline creatinine appears to be around 4-5  · Creatinine gradually improving  · Will continue gentle hydration  · Lasix held for now  · Nephrology input appreciated  · Will monitor urine output  · Avoid any nephrotoxic meds

## 2021-01-27 NOTE — PROGRESS NOTES
Progress Note - Champ Tatum 1940, 79 y o  male MRN: [id number]    Unit/Bed#: -01 Encounter: [id number]    Primary Care Provider: RIKA Monroe   Date and time admitted to hospital: 1/24/2021  4:30 PM        * Pneumonia due to COVID-19 virus  Assessment & Plan  · Will continue treatment under mild algorithm, patient currently not requiring oxygen  · Continue remdesivir day 4  · Patient was not started on dexamethasone or plasma therapy given that he is not requiring oxygen at this time  · Will continue to monitor inflammatory markers  · Continue ceftriaxone  · Continue supportive care    Acute renal failure superimposed on stage 5 chronic kidney disease, not on chronic dialysis [facility])  Assessment & Plan  · Likely exacerbated by COVID infection  Baseline creatinine appears to be around 4-5  · Creatinine gradually improving  · Will continue gentle hydration  · Lasix held for now  · Nephrology input appreciated  · Will monitor urine output  · Avoid any nephrotoxic meds    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 [facility])  Assessment & Plan  · 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  · Follows with Dr Valeria Larsen, on monthly injectable    Essential hypertension  Assessment & Plan  · BP stable  · Continue nifedipine    Type 2 diabetes mellitus with hyperglycemia, with long-term current use of insulin (HCC)  Assessment & Plan  · No further hypoglycemia noted  · Will continue to hold Levemir  · Continue insulin sliding scale  · Monitor for hypoglycemia      VTE Prophylaxis:  Heparin    Patient Centered Rounds: I have performed bedside rounds with nursing staff today      Discussions with Specialists or Other Care Team Provider: yes  Education and Discussions with Family / Patient:  Spoke with patient's significant other over the phone regarding plan of care    Current Length of Stay: 3 day(s)    Current Patient Status: Inpatient   Certification Statement: The patient will continue to require additional inpatient hospital stay due to COVID infection    Discharge Plan:  Hopeful discharge planning for tomorrow    Code Status: Level 1 - Full Code    Subjective:   No overnight events noted  Patient denies any pain complaints  States he does feel weak and fatigued today  Decreased appetite  Objective:     Vitals:   Temp (24hrs), Av 1 °F (36 7 °C), Min:96 8 °F (36 °C), Max:99 4 °F (37 4 °C)    Temp:  [96 8 °F (36 °C)-99 4 °F (37 4 °C)] 99 4 °F (37 4 °C)  HR:  [80-82] 80  Resp:  [23-26] 26  BP: (131-136)/(73-78) 131/73  SpO2:  [94 %-96 %] 94 %  Body mass index is 23 5 kg/m²  Input and Output Summary (last 24 hours): Intake/Output Summary (Last 24 hours) at 2021 1448  Last data filed at 2021 0700  Gross per 24 hour   Intake 120 ml   Output 2925 ml   Net -2805 ml       Physical Exam:   Physical Exam  Constitutional:       General: He is not in acute distress  Comments: Frail elderly male   HENT:      Head: Normocephalic and atraumatic  Nose: Nose normal       Mouth/Throat:      Mouth: Mucous membranes are moist    Eyes:      Extraocular Movements: Extraocular movements intact  Conjunctiva/sclera: Conjunctivae normal    Neck:      Musculoskeletal: Normal range of motion and neck supple  Cardiovascular:      Rate and Rhythm: Normal rate and regular rhythm  Pulmonary:      Effort: Pulmonary effort is normal  No respiratory distress  Abdominal:      Palpations: Abdomen is soft  Tenderness: There is no abdominal tenderness  Musculoskeletal:      Comments: Generalized weakness   Skin:     General: Skin is warm and dry  Neurological:      General: No focal deficit present  Cranial Nerves: No cranial nerve deficit  Psychiatric:         Mood and Affect: Mood is depressed           Additional Data:     Labs:    Results from last 7 days   Lab Units 21  0539   WBC Thousand/uL 5 90   HEMOGLOBIN g/dL 10 0*   HEMATOCRIT % 29 9*   PLATELETS Thousands/uL 165   NEUTROS PCT % 71   LYMPHS PCT % 17*   MONOS PCT % 12   EOS PCT % 0     Results from last 7 days   Lab Units 01/27/21  0539   SODIUM mmol/L 142   POTASSIUM mmol/L 3 5   CHLORIDE mmol/L 107   CO2 mmol/L 21   BUN mg/dL 52*   CREATININE mg/dL 5 65*   CALCIUM mg/dL 8 3*   ALK PHOS U/L 76   ALT U/L 9   AST U/L 15     Results from last 7 days   Lab Units 01/24/21  1800   INR  1 08     Results from last 7 days   Lab Units 01/27/21  1105 01/27/21  0700 01/26/21  2034 01/26/21  1616 01/26/21  1140 01/26/21  0512 01/25/21  2044 01/25/21  1606 01/25/21  1227 01/25/21  1145 01/25/21  0507 01/24/21  2046   POC GLUCOSE mg/dl 157* 141* 212* 178* 202* 156* 211* 183* 129 55* 89 163*           * I Have Reviewed All Lab Data Listed Above  * Additional Pertinent Lab Tests Reviewed:  Laura 65 Admission  Reviewed    Imaging:  Imaging Reports Reviewed Today Include:  No new imaging    Recent Cultures (last 7 days):     Results from last 7 days   Lab Units 01/25/21  0537   URINE CULTURE  No Growth <1000 cfu/mL       Last 24 Hours Medication List:   Current Facility-Administered Medications   Medication Dose Route Frequency Provider Last Rate    acetaminophen  650 mg Oral Q6H PRN Sancho Morton DO      ascorbic acid  1,000 mg Oral Q12H [facility] Ady Suggs DO      aspirin  81 mg Oral Daily Sancho Morton DO      b complex-vitamin C-folic acid  1 capsule Oral Daily With [employer], DO      cefTRIAXone  1,000 mg Intravenous Q24H Jerry Koehler MD 1,000 mg (01/27/21 1406)    cholecalciferol  2,000 Units Oral Daily Sancho Morton DO      colchicine  0 3 mg Oral Daily Sancho Morton DO      vitamin B-12  100 mcg Oral Daily Ady Suggs DO      famotidine  20 mg Intravenous Q24H [facility] Adystephanie Suggs DO      heparin (porcine)  5,000 Units Subcutaneous Q8H [facility] Adystephanie Suggs DO      HYDROmorphone  0 2 mg Intravenous Q4H PRN Sancho Morton DO      insulin lispro  1-6 Units Subcutaneous 4x Daily (AC & HS) Diana Tidwell Ifeanyi, DO      magnesium oxide  200 mg Oral BID Mirian Raygoza, DO      zinc sulfate  220 mg Oral Daily Ady Suggs, DO      Followed by   Una Rodrigez ON 2/1/2021] multivitamin-minerals  1 tablet Oral Daily Ady Suggs, DO      NIFEdipine  60 mg Oral Daily Ady Suggs, DO      ondansetron  4 mg Intravenous Q4H PRN Mirian Raygoza, DO      remdesivir  100 mg Intravenous Q24H Ady Suggs,  mg (01/26/21 2012)    sodium bicarbonate  325 mg Oral BID Mirian Raygoza, DO      tamsulosin  0 4 mg Oral Daily With [employer], DO      vancomycin  125 mg Oral Q12H Randi Kyle MD          Today, Patient Was Seen By: Rei Tena MD    ** Please Note: Dictation voice to text software may have been used in the creation of this document   **

## 2021-01-28 NOTE — NURSING NOTE
Received to icu #3 from [facility], post code rescue d/t vasovagal response  Pt alert and awake  No c/o at this time  Temp 96, O2 sat 99% on 2l nc  Placed on icu monitors, vs taken  Assessment as noted in computer charting  Safety in place  Dr Nimesh Fisher at bedside to insert central line, which was placed at 1750 to [facility]

## 2021-01-28 NOTE — DISCHARGE SUMMARY
Discharge- David Camp 1940, 79 y o  male MRN: [id number]    Unit/Bed#: -01 Encounter: [id number]    Primary Care Provider: RIKA Isbell   Date and time admitted to hospital: 1/24/2021  4:30 PM        Acute renal failure superimposed on stage 5 chronic kidney disease, not on chronic dialysis [facility])  Assessment & Plan  · Likely exacerbated by COVID infection  Baseline creatinine appears to be around 4-5  · Creatinine gradually improving  · Lasix held for now  · Nephrology input appreciated  · Avoid any nephrotoxic meds  · Follow-up with PCP and Nephrology as outpatient for routine lab monitoring    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 [facility])  Assessment & Plan  · 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  · Follows with Dr Priscilla Brunner, on monthly injectable    Essential hypertension  Assessment & Plan  · BP stable  · Continue nifedipine    Type 2 diabetes mellitus with hyperglycemia, with long-term current use of insulin ([address] )  Assessment & Plan  · Fingersticks have been elevated over last 24 hours  · Resume home regimen      Discharging Physician / Practitioner: Jerry Koehler MD  PCP: Laura Mckeon [address] [place] [address]  Admission Date:   Admission Orders (From admission, onward)     Ordered        01/24/21 1854  Inpatient Admission  Once                   Discharge Date: 01/28/21    Resolved Problems  Date Reviewed: 1/24/2021    None          Consultations During Hospital Stay:  · Nephrology    Procedures Performed:   · None    Significant Findings / Test Results:   · COVID infection    Incidental Findings:   · None     Test Results Pending at Discharge (will require follow up): · None     Outpatient Tests Requested:  · Routine labs with PCP    Complications:     None    Reason for Admission:  COVID infection    Hospital Course:     David Camp is a 79 y o  male patient who originally presented to the hospital on 1/24/2021 due to generalized weakness  Patient admitted with COVID infection  Patient was started on treatment with remdesivir  Patient was not requiring oxygen  Patient also noted to have acute on chronic kidney injury  Patient was seen by Nephrology  Patient was continued on IV fluids  Creatinine was gradually improving  Procalcitonin remained normal and patient's antibiotics were discontinued on discharge  Patient advised to follow-up with PCP as outpatient for routine follow-up    PATIENT WAS INITIALLY SCHEDULED TO BE DISCHARGED HOWEVER PATIENT HAD RAPID RESPONSE WHILE HAVING A BOWEL MOVEMENT  DISCHARGE PLAN CANCELED    Please see above list of diagnoses and related plan for additional information  Condition at Discharge: stable     Discharge Day Visit / Exam:     Subjective:  No overnight events noted  Patient tolerating diet per RN  Diarrhea improving    Vitals: Blood Pressure: (!) 182/76 (01/28/21 1452)  Pulse: 81 (01/28/21 1452)  Temperature: (!) 97 °F (36 1 °C) (01/28/21 1452)  Temp Source: Temporal (01/28/21 1452)  Respirations: 18 (01/28/21 1452)  Height: 5' 8" (172 7 cm) (01/24/21 2051)  Weight - Scale: 70 1 kg (154 lb 8 7 oz) (01/24/21 2051)  SpO2: 97 % (01/28/21 1452)     Exam:   Physical Exam  Constitutional:       General: He is not in acute distress  Comments: Frail elderly male   HENT:      Head: Normocephalic and atraumatic  Nose: Nose normal       Mouth/Throat:      Mouth: Mucous membranes are moist    Eyes:      Extraocular Movements: Extraocular movements intact  Conjunctiva/sclera: Conjunctivae normal    Neck:      Musculoskeletal: Normal range of motion and neck supple  Cardiovascular:      Rate and Rhythm: Normal rate and regular rhythm  Pulmonary:      Effort: Pulmonary effort is normal  No respiratory distress  Abdominal:      Palpations: Abdomen is soft  Tenderness: There is no abdominal tenderness  Musculoskeletal:      Comments: Generalized weakness   Skin:     General: Skin is warm and dry     Neurological:      General: No focal deficit present  Mental Status: He is alert  Cranial Nerves: No cranial nerve deficit  Psychiatric:         Mood and Affect: Mood is depressed  Discussion with Family:  Spoke with patient's son over the phone regarding discharge plan    Discharge instructions/Information to patient and family:   See after visit summary for information provided to patient and family  Provisions for Follow-Up Care:  See after visit summary for information related to follow-up care and any pertinent home health orders  Disposition:     Home with VNA Services (Reminder: Complete face to face encounter)      Planned Readmission:    no     Discharge Statement:  I spent 35 minutes discharging the patient  This time was spent on the day of discharge  I had direct contact with the patient on the day of discharge  Greater than 50% of the total time was spent examining patient, answering all patient questions, arranging and discussing plan of care with patient as well as directly providing post-discharge instructions  Additional time then spent on discharge activities  Discharge Medications:  See after visit summary for reconciled discharge medications provided to patient and family        ** Please Note: This note has been constructed using a voice recognition system **

## 2021-01-28 NOTE — NURSING NOTE
While pt being discharged, Pt requested to use the commode  Pt started to have a bowel movement and became unresponsive  Rapid response was activated  Pt was brought to bed and became responsive, drowsy with c/o nausea and vomiting  Pt able to follow commands  Dr Vee Or at bedside  New orders in place  Pt transferred to ICU  Report given to [facility]

## 2021-01-28 NOTE — DISCHARGE INSTR - AVS FIRST PAGE
Dear Chloe Edouard,     It was our pleasure to care for you here at [facility]  It is our hope that we were always able to exceed the expected standards for your care during your stay  You were hospitalized due to COVID infection  You were cared for on the medical floor by Moshe Claudio MD with the [facility] who covers for your primary care physician (PCP), RIKA Ta, while you were hospitalized  If you have any questions or concerns related to this hospitalization, you may contact us at [phone]  For follow up as well as any medication refills, we recommend that you follow up with your primary care physician  A registered nurse will reach out to you by phone within a few days after your discharge to answer any additional questions that you may have after going home  However, at this time we provide for you here, the most important instructions / recommendations at discharge:     · Notable Medication Adjustments -   · Lasix has been held for now  Follow-up with PCP and Nephrology regarding when to resume  · Hold Levemir dosing until follow-up with PCP  · Testing Required after Discharge -   · Routine labs with PCP in 1 week  · Important follow up information -   · Follow-up with PCP in 7-10 days of discharge  · Other Instructions -   · Please see discharge instructions  · Please review this entire after visit summary as additional general instructions including medication list, appointments, activity, diet, any pertinent wound care, and other additional recommendations from your care team that may be provided for you        Sincerely,     Moshe Claudio MD

## 2021-01-28 NOTE — PROCEDURES
07-Nov-2018 08:00 Central Line Insertion    Date/Time: 1/28/2021 6:04 PM  Performed by: Natalya Sanchez MD  Authorized by: Natalya Sanchez MD     Patient location:  IR and bedside  Other Assisting Provider: No    Consent:     Consent obtained:  Verbal    Consent given by:  Patient    Risks discussed:  Bleeding, infection and pneumothorax    Alternatives discussed:  No treatment and delayed treatment  Universal protocol:     Procedure explained and questions answered to patient or proxy's satisfaction: yes      Relevant documents present and verified: yes      Test results available and properly labeled: yes      Radiology Images displayed and confirmed  If images not available, report reviewed: yes      Required blood products, implants, devices, and special equipment available: yes      Site/side marked: yes      Immediately prior to procedure, a time out was called: yes      Patient identity confirmed:  Verbally with patient and arm band  Pre-procedure details:     Hand hygiene: Hand hygiene performed prior to insertion      Sterile barrier technique: All elements of maximal sterile technique followed      Skin preparation:  2% chlorhexidine    Skin preparation agent: Skin preparation agent completely dried prior to procedure    Indications:     Site selection rationale:  New transfer to ICU after rapid response  Right-side with best chance of initial success  Anesthesia (see MAR for exact dosages):      Anesthesia method:  Local infiltration    Local anesthetic:  Lidocaine 1% w/o epi  Procedure details:     Location:  Right internal jugular    Vessel type: vein      Laterality:  Right    Approach: percutaneous technique used      Patient position:  Flat    Catheter type:  Triple lumen 16cm    Catheter size:  7 Fr    Landmarks identified: yes      Ultrasound guidance: yes      Sterile ultrasound techniques: Sterile gel and sterile probe covers were used      Number of attempts:  1    Successful placement: yes      Vessel of catheter tip end:  Right atrium  Post-procedure details:     Post-procedure:  Dressing applied    Assessment:  Blood return through all ports, no pneumothorax on x-ray and placement verified by x-ray    Post-procedure complications: none      Patient tolerance of procedure:   Tolerated well, no immediate complications  Comments:      Okay to use

## 2021-01-28 NOTE — DISCHARGE INSTRUCTIONS
10% - bad control"> 10% - bad control,Hemoglobin A1c (HbA1c) greater than 10% indicating poor diabetic control,Haemoglobin A1c greater than 10% indicating poor diabetic control">   Diabetes Mellitus Type 2 in Adults, Ambulatory Care   GENERAL INFORMATION:   Diabetes mellitus type 2  is a disease that affects how your body uses glucose (sugar)  Insulin helps move sugar out of the blood so it can be used for energy  Normally, when the blood sugar level increases, the pancreas makes more insulin  Type 2 diabetes develops because either the body cannot make enough insulin, or it cannot use the insulin correctly  After many years, your pancreas may stop making insulin  Common symptoms include the following:   · More hunger or thirst than usual     · Frequent urination     · Weight loss without trying     · Blurred vision  Seek immediate care for the following symptoms:   · Severe abdominal pain, or pain that spreads to your back  You may also be vomiting  · Trouble staying awake or focusing    · Shaking or sweating    · Blurred or double vision    · Breath has a fruity, sweet smell    · Breathing is deep and labored, or rapid and shallow    · Heartbeat is fast and weak  Treatment for diabetes mellitus type 2  includes keeping your blood sugar at a normal level  You must eat the right foods, and exercise regularly  You may also need medicine if you cannot control your blood sugar level with nutrition and exercise  Manage diabetes mellitus type 2:   · Check your blood sugar level  You will be taught how to check a small drop of blood in a glucose monitor  Ask your healthcare provider when and how often to check during the day  Ask your healthcare provider what your blood sugar levels should be when you check them  · Keep track of carbohydrates (sugar and starchy foods)  Your blood sugar level can get too high if you eat too many carbohydrates   Your dietitian will help you plan meals and snacks that have the right amount of carbohydrates  · Eat low-fat foods  Some examples are skinless chicken and low-fat milk  · Eat less sodium (salt)  Some examples of high-sodium foods to limit are soy sauce, potato chips, and soup  Do not add salt to food you cook  Limit your use of table salt  · Eat high-fiber foods  Foods that are a good source of fiber include vegetables, whole grain bread, and beans  · Limit alcohol  Alcohol affects your blood sugar level and can make it harder to manage your diabetes  Women should limit alcohol to 1 drink a day  Men should limit alcohol to 2 drinks a day  A drink of alcohol is 12 ounces of beer, 5 ounces of wine, or 1½ ounces of liquor  · Get regular exercise  Exercise can help keep your blood sugar level steady, decrease your risk of heart disease, and help you lose weight  Exercise for at least 30 minutes, 5 days a week  Include muscle strengthening activities 2 days each week  Work with your healthcare provider to create an exercise plan  · Check your feet each day  for injuries or open sores  Ask your healthcare provider for activities you can do if you have an open sore  · Quit smoking  If you smoke, it is never too late to quit  Smoking can worsen the problems that may occur with diabetes  Ask your healthcare provider for information about how to stop smoking if you are having trouble quitting  · Ask about your weight:  Ask healthcare providers if you need to lose weight, and how much to lose  Ask them to help you with a weight loss program  Even a 10 to 15 pound weight loss can help you manage your blood sugar level  · Carry medical alert identification  Wear medical alert jewelry or carry a card that says you have diabetes  Ask your healthcare provider where to get these items  · Ask about vaccines  Diabetes puts you at risk of serious illness if you get the flu, pneumonia, or hepatitis   Ask your healthcare provider if you should get a flu, pneumonia, or hepatitis B vaccine, and when to get the vaccine  Follow up with your healthcare provider as directed:  Write down your questions so you remember to ask them during your visits  CARE AGREEMENT:   You have the right to help plan your care  Learn about your health condition and how it may be treated  Discuss treatment options with your caregivers to decide what care you want to receive  You always have the right to refuse treatment  The above information is an  only  It is not intended as medical advice for individual conditions or treatments  Talk to your doctor, nurse or pharmacist before following any medical regimen to see if it is safe and effective for you  © 2014 [address] is for End User's use only and may not be sold, redistributed or otherwise used for commercial purposes  All illustrations and images included in CareNotes® are the copyrighted property of [employer]  or [employer]  How to Care for Your Suprapubic Catheter   WHAT YOU NEED TO KNOW:   What is a suprapubic catheter? A suprapubic catheter is a tube that drains urine from your bladder  It is inserted through a small hole in your lower abdomen and into your bladder  Suprapubic means that the catheter is inserted above your pubic bone  You may need a catheter because you have problems urinating because of a medical condition or surgery  A suprapubic catheter is also called an indwelling urinary catheter  Why is catheter care important? An infection can develop when bacteria get inside the catheter or drainage system  This can happen when the urine bag is changed or when a urine sample is collected  You can get an infection if you do not wash your hands  You can also get an infection if the catheter equipment is not cleaned properly  Urinary catheter-based infections can lead to serious illness   The following can help prevent infection:  · Care for your catheter as directed  Follow directions on how to clean and care for the catheter, insertion site, and drainage bag  Keep the catheter drainage system closed  Keep the catheter tube secured to your leg so it will drain well  · Drink liquids as directed  Ask how much liquid to drink each day and which liquids are best for you  Good choices for most people include water, juice, and milk  Coffee, soup, and fruit may be counted in your daily liquid amount  · Wash your hands often  Always wash with soap and water before and after you touch your catheter, tubing, or drainage bag  Wear clean medical gloves when you care for your catheter or disconnect the drainage bag  This will help stop germs from getting into your catheter  Remind anyone who cares for your catheter or drainage system to wash his or her hands  How do I care for my drainage bag? · Always wash your hands before and after  you touch the catheter or the insertion site  Wear clean medical gloves when you care for your catheter  · Position the drainage bag and tubing:      ? Allow gravity drainage  Do not loop or kink the tubing so urine can flow into the bag     ? Position the drainage bag properly  Keep the drainage bag below the level of your waist  This helps stop urine from moving back up the tubing and into your bladder  ? Keep the bag off the floor  This will help prevent contamination  · Empty the drainage bag when needed  The weight of a full drainage bag can pull on the catheter and cause pain  Empty the drainage bag every 3 to 6 hours or when it is ½ to ? full  ? Place a large container on the floor next to your chair  You may also hold the urine bag over the toilet  ? Remove the drain spout from its sleeve at the bottom of the urine bag  Do not touch the tip  Open the slide valve on the spout  ? Let the urine flow out of the urine bag into the container or toilet  Do not let the drainage tube touch anything  ?  Clean the end of the drain spout when the bag is empty  Ask your healthcare provider which cleaning solution is best to use  ? Close the slide valve and put the drain spout into its sleeve at the bottom of the urine bag  Write down how much urine was in your bag if your healthcare provider has asked you to keep a record  · Clean and change the drainage bag as directed  Ask your healthcare provider how often you should change the drainage bag  You may buy a solution to clean the drainage bag  You may also make a solution with tap water and household bleach or vinegar  Wear medical gloves if you need to disconnect the tubing  Do not allow the end of the catheter or tubing to touch anything  Clean the ends with a new alcohol pad or as directed by your healthcare provider before you reconnect them  What else do I need to know? · Wash your genital area and the insertion site with soap and water 2 times a day  Wash your anal area with soap and water after each bowel movement  · You may need to use the larger drainage bag at night  A leg bag can be used during the day  A leg bag usually fits under clothing  When should I call my doctor? · You have a fever  · You have changes in how your urine looks or smells, or you have blood in your urine  · You have an overgrowth of skin at the insertion site that is getting larger  · The closed drainage system has accidently come open or apart  · Your catheter keeps getting blocked  · There is less urine than usual or no urine draining into the drainage bag  · The catheter comes out  · Your insertion site is red, smells bad, or has green or yellow discharge  · You have pain in your hip, back, pelvis, or lower abdomen  · You are confused or have other changes in the way that you think  · You have questions or concerns about how to care for your catheter  CARE AGREEMENT:   You have the right to help plan your care   Learn about your health condition and how it may be treated  Discuss treatment options with your healthcare providers to decide what care you want to receive  You always have the right to refuse treatment  The above information is an  only  It is not intended as medical advice for individual conditions or treatments  Talk to your doctor, nurse or pharmacist before following any medical regimen to see if it is safe and effective for you  © Copyright [address] Information is for End User's use only and may not be sold, redistributed or otherwise used for commercial purposes   All illustrations and images included in CareNotes® are the copyrighted property of [employer]  or [address] [place] [address] [facility]

## 2021-01-28 NOTE — CASE MANAGEMENT
Unable to send in basket messaging to [facility]  TC to PCP Jose Ortiz, spoke to Adilene and made an appt for 2/2/21 at 10:30  SO has declined STR  Sent AVS to [facility]  Spoke to Betsey Smith to review the IMM  IMM reviewed with patient's caregiver  patient's caregiver agree with discharge determination  Betsey Smith reports she will transport pt home at 4pm   Notified Lisandro Galicia RN of same

## 2021-01-28 NOTE — QUICK NOTE
Patient was scheduled to be discharged today  While having a bowel movement prior to discharge patient had a vasovagal episode  Patient became lightheaded and unresponsive  Nurse was in the room with the aid during episode  Patient was on the bedside commode and fell forward caught by a before he fell off the commode  Patient was transferred to the bed  Patient was awake and drowsy  Patient able to follow simple commands  Blood pressure was 136/102  Patient was started on IV fluids  Discharge Plan has been canceled  Will check labs including troponin  Spoke with patient's significant other Yahaira Ravi over the phone regarding cancel discharge plan  Updated her regarding plan of care as well

## 2021-01-28 NOTE — PROGRESS NOTES
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 baseline creatinine around 4 5 mg/dL                continue avoid nephrotoxins, as noted previously patient does not wish to pursue dialysis should the need for extracorporeal renal replacement therapy be indicated  3  Hypertensive nephrosclerosis  4  Hypokalemia             Potassium remains appropriate, continue to monitor and offer supplement as indicated         5  Recurrent urinary tract infections                continue to encourage good hygiene, patient has a chronic Haile catheter  6  Urinary retention               continue Haile catheter     7  COVID-19                continue care per hospitalist  8  Diabetes mellitus type 2  9   Metastatic neuroendocrine carcinoma      Follow up reason for today's visit:  Acute kidney injury/chronic kidney disease    Pneumonia due to COVID-19 virus    Patient Active Problem List   Diagnosis    Type 2 diabetes mellitus with hyperglycemia, with long-term current use of insulin ([address] )    Essential hypertension    Mixed hyperlipidemia    Iron deficiency anemia secondary to inadequate dietary iron intake    Syncope and collapse    Liver mass    Neuroendocrine tumor    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)    Pressure injury of right heel, unstageable (HCC)    Atherosclerosis of artery of extremity with ulceration (HCC)    Carcinoid syndrome ([address] )    Urinary catheter in place    Malignant neuroendocrine neoplasm ([address] )    Abdominal pain    UTI due to extended-spectrum beta lactamase (ESBL) producing Escherichia coli    Hypomagnesemia    Normocytic anemia    Goals of care, counseling/discussion    Hyperparathyroid bone disease (HCC)    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    Sepsis (HCC)    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    Low TSH level    Severe protein-calorie malnutrition ([facility])    Chronic Diastolic congestive heart failure    Premature atrial complexes    Bradycardia    Generalized weakness    Recurrent UTI    Gastroesophageal reflux disease without esophagitis    Ureterolithiasis    Obstructive uropathy    Gait difficulty    CAD (coronary artery disease)    Azotemia    Acidosis    Hypertensive kidney disease with stage 5 chronic kidney disease, not on chronic dialysis (HCC)    Edema    Chest pain    Chest pressure    Urinary retention    Elevated d-dimer    Esophageal dysphagia    Dizziness    Anemia due to chronic kidney disease    CKD (chronic kidney disease) stage 5, GFR less than 15 ml/min (HCC)    Syncope    SIRS (systemic inflammatory response syndrome) ([facility])    Acute renal failure superimposed on stage 5 chronic kidney disease, not on chronic dialysis ([facility])    Pneumonia due to COVID-19 virus         Subjective:   No acute events overnight    Objective:     Vitals: Blood pressure 138/92, pulse 81, temperature (!) 97 4 °F (36 3 °C), temperature source Tympanic, resp  rate 20, height 5' 8" (1 727 m), weight 70 1 kg (154 lb 8 7 oz), SpO2 97 %  ,Body mass index is 23 5 kg/m²  Weight (last 2 days)     None            Intake/Output Summary (Last 24 hours) at 1/28/2021 1415  Last data filed at 1/28/2021 0601  Gross per 24 hour   Intake 300 ml   Output 1250 ml   Net -950 ml     I/O last 3 completed shifts: In: 300 [P O :300]  Out: 2575 [Urine:2575]    Urethral Catheter Double-lumen 16 Fr   (Active)       Urethral Catheter (Active)   Reasons to continue Urinary Catheter  Chronic urinary catheter 01/27/21 1791 Goal for Removal N/A- chronic garcia 01/27/21 0736   Site Assessment Clean;Skin intact 01/27/21 0736   Collection Container Standard drainage bag 01/27/21 0736   Securement Method Securing device (Describe) 01/27/21 0736   Output (mL) 550 mL 01/28/21 0601       Physical Exam: /92 (BP Location: Right arm)   Pulse 81   Temp (!) 97 4 °F (36 3 °C) (Tympanic)   Resp 20   Ht 5' 8" (1 727 m)   Wt 70 1 kg (154 lb 8 7 oz)   SpO2 97%   BMI 23 50 kg/m²     General Appearance:    Alert, cooperative, no distress, appears stated age   Head:    Normocephalic, without obvious abnormality, atraumatic   Eyes:    Conjunctiva/corneas clear   Ears:    Normal external ears   Nose:   Nares normal, septum midline, mucosa normal, no drainage    or sinus tenderness   Throat:   Lips, mucosa, and tongue normal; teeth and gums normal   Neck:   Supple,   Back:     Symmetric, no curvature, ROM normal, no CVA tenderness   Lungs:     Clear to auscultation bilaterally, respirations unlabored   Chest wall:    No tenderness or deformity   Heart:    Regular rate and rhythm, S1 and S2 normal, no murmur, rub   or gallop   Abdomen:     Soft, non-tender, bowel sounds active   Extremities:   Extremities normal, atraumatic, no cyanosis or edema   Skin:   Skin color, texture, turgor normal, no rashes or lesions   Lymph nodes:   Cervical normal   Neurologic:   CNII-XII intact            Lab, Imaging and other studies: I have personally reviewed pertinent labs  CBC: No results found for: WBC, HGB, HCT, MCV, PLT, ADJUSTEDWBC, MCH, MCHC, RDW, MPV, NRBC  CMP:   Lab Results   Component Value Date    K 3 6 01/28/2021     01/28/2021    CO2 21 01/28/2021    BUN 53 (H) 01/28/2021    CREATININE 5 54 (H) 01/28/2021    CALCIUM 8 5 (L) 01/28/2021    AST 20 01/28/2021    ALT 9 01/28/2021    ALKPHOS 77 01/28/2021    EGFR 9 01/28/2021           Results from last 7 days   Lab Units 01/28/21  0606 01/27/21  0539 01/26/21  0520   POTASSIUM mmol/L 3 6 3 5 4 0 CHLORIDE mmol/L 106 107 107   CO2 mmol/L 21 21 20*   BUN mg/dL 53* 52* 52*   CREATININE mg/dL 5 54* 5 65* 5 84*   CALCIUM mg/dL 8 5* 8 3* 7 8*   ALK PHOS U/L 77 76 74   ALT U/L 9 9 7   AST U/L 20 15 15         Phosphorus: No results found for: PHOS  Magnesium:   Lab Results   Component Value Date    MG 1 7 (L) 01/28/2021     Urinalysis: No results found for: COLORU, CLARITYU, SPECGRAV, PHUR, LEUKOCYTESUR, NITRITE, PROTEINUA, GLUCOSEU, KETONESU, BILIRUBINUR, BLOODU  Ionized Calcium: No results found for: CAION  Coagulation: No results found for: PT, INR, APTT  Troponin: No results found for: TROPONINI  ABG: No results found for: PHART, NMA0KXF, PO2ART, XFL8IAD, E3QKJKNN, BEART, SOURCE  Radiology review:     IMAGING  No results found      Current Facility-Administered Medications   Medication Dose Route Frequency    acetaminophen (TYLENOL) tablet 650 mg  650 mg Oral Q6H PRN    ascorbic acid (VITAMIN C) tablet 1,000 mg  1,000 mg Oral Q12H [address]    aspirin (ECOTRIN LOW STRENGTH) EC tablet 81 mg  81 mg Oral Daily    b complex-vitamin C-folic acid (NEPHROCAPS) capsule 1 capsule  1 capsule Oral Daily With Dinner    cefTRIAXone (ROCEPHIN) IVPB (premix in dextrose) 1,000 mg 50 mL  1,000 mg Intravenous Q24H    cholecalciferol (VITAMIN D3) tablet 2,000 Units  2,000 Units Oral Daily    colchicine (COLCRYS) tablet 0 3 mg  0 3 mg Oral Daily    cyanocobalamin (VITAMIN B-12) tablet 100 mcg  100 mcg Oral Daily    famotidine (PEPCID) injection 20 mg  20 mg Intravenous Q24H NATACHA    heparin (porcine) subcutaneous injection 5,000 Units  5,000 Units Subcutaneous Q8H [address]    HYDROmorphone (DILAUDID) injection 0 2 mg  0 2 mg Intravenous Q4H PRN    insulin lispro (HumaLOG) 100 units/mL subcutaneous injection 1-6 Units  1-6 Units Subcutaneous 4x Daily (AC & HS)    magnesium oxide (MAG-OX) tablet 200 mg  200 mg Oral BID    zinc sulfate (ZINCATE) capsule 220 mg  220 mg Oral Daily    Followed by   Timo Howard ON 2/1/2021] multivitamin-minerals (CENTRUM) tablet 1 tablet  1 tablet Oral Daily    NIFEdipine (PROCARDIA XL) 24 hr tablet 60 mg  60 mg Oral Daily    ondansetron (ZOFRAN) injection 4 mg  4 mg Intravenous Q4H PRN    remdesivir (Veklury) 100 mg in sodium chloride 0 9 % 250 mL IVPB  100 mg Intravenous Q24H    sodium bicarbonate tablet 325 mg  325 mg Oral BID    tamsulosin (FLOMAX) capsule 0 4 mg  0 4 mg Oral Daily With Dinner    vancomycin (VANCOCIN) oral solution 125 mg  125 mg Oral Q12H [facility]     Medications Discontinued During This Encounter   Medication Reason    BD INSULIN SYRINGE U/F 30G X 1/2" 0 3 ML MISC Error    insulin detemir (Levemir) 100 units/mL subcutaneous injection Error    insulin lispro (HumaLOG) 100 units/mL injection Error    Insulin Syringe-Needle U-100 30G X 1/2" 1 ML MISC Error    meclizine (ANTIVERT) 25 mg tablet Error    ofloxacin (OCUFLOX) 0 3 % ophthalmic solution Error    prednisoLONE acetate (PRED FORTE) 1 % ophthalmic suspension Error    insulin detemir (LEVEMIR) subcutaneous injection 5 Units     remdesivir (Veklury) 100 mg in sodium chloride 0 9 % 250 mL IVPB        Joanne Rose, DO      This progress note was produced in part using a dictation device which may document imprecise wording from author's original intent

## 2021-01-28 NOTE — ASSESSMENT & PLAN NOTE
· Likely exacerbated by COVID infection    Baseline creatinine appears to be around 4-5  · Creatinine gradually improving  · Lasix held for now  · Nephrology input appreciated  · Avoid any nephrotoxic meds  · Follow-up with PCP and Nephrology as outpatient for routine lab monitoring

## 2021-01-29 NOTE — PROGRESS NOTES
NEPHROLOGY PROGRESS NOTE   Jessy Mcgraw 79 y o  male MRN: [id number]  Unit/Bed#: ICU 03 Encounter: [id number]    Assessment/Plan:    In summary, this is an 79-year-old man well known to this service with pertinent medical problems significant for CKD 5 medically managed without dialysis, metastatic neuroendocrine carcinoma, chronic indwelling urinary catheter admitted 1/24 and being treated for COVID-19  Renal following for acute kidney injury  Overnight rapid response due to vasovagal event while using the bathroom and now transferred to ICU  1  Acute kidney injury (POA) atop chronic kidney disease  · Renal function modestly improved  Will continue to volume expand due to continued loose stools and decreased oral intake  Continue to avoid nephrotoxins, maximize hemodynamics and provide supportive care  2  Stage 5 chronic kidney disease, medically managed without hemodialysis, with baseline creatinine around 4 5 mg/dL  3  Acidosis  · Lactic acid normal   Having frequent loose stools  Repeat BMP now to ensure stability and will adjust sodium bicarbonate based upon this result  4  Urinary retention  · Continue indwelling urinary catheter  5  Vasovagal episode  · Management per primary team  6  Hypertensive nephrosclerosis  · P r n  Hydralazine added  7  Hypokalemia-resolved  8  COVID-19  · Continue care according hospitalist  9  Metastatic neuroendocrine carcinoma      ROS  States he feels unwell  Incontinent of stool  A complete 10 point review of systems have been performed and are otherwise negative         Historical Information   Past Medical History:   Diagnosis Date    Acute pancreatitis without infection or necrosis 9/26/2019    Age-related nuclear cataract, right 11/5/2020    Anemia of chronic renal failure     BPH (benign prostatic hyperplasia)     CKD (chronic kidney disease) stage 5, GFR less than 15 ml/min (HCC)     Coronary artery disease     DJD (degenerative joint disease)     Essential hypertension     Gait disturbance     Generalized weakness     GERD (gastroesophageal reflux disease)     Gout     History of transfusion     Hydronephrosis     Hyperlipidemia     Hypertension     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)     Pressure injury of skin     Proteinuria     Thrombophlebitis migrans     Type 2 diabetes mellitus      Past Surgical History:   Procedure Laterality Date    CATARACT EXTRACTION Right 11/05/2020    CATARACT EXTRACTION W/ INTRAOCULAR LENS IMPLANT Right 11/5/2020    Procedure: EYE OD CATARACT EXTRACTION WITH IOL INSERTION;  Surgeon: Monique Chan MD;  Location: [facility] MAIN OR;  Service: Ophthalmology    CHOLECYSTECTOMY     [facility] N/A 2/7/2020    Procedure: CHOLECYSTECTOMY LAPAROSCOPIC;  Surgeon:  Erica Fernandez MD;  Location: [facility] MAIN OR;  Service: General    CORONARY ANGIOPLASTY WITH STENT PLACEMENT      FL RETROGRADE PYELOGRAM  5/21/2020    IR BIOPSY LIVER MASS  1/24/2019    IR CHOLECYSTOSTOMY TUBE PLACEMENT  9/6/2019    IR NON-TUNNELED CENTRAL LINE PLACEMENT  1/28/2021    MN CYSTO/URETERO W/LITHOTRIPSY &INDWELL STENT INSRT Left 5/21/2020    Procedure: CYSTOSCOPY URETEROSCOPY WITH LITHOTRIPSY HOLMIUM LASER, RETROGRADE PYELOGRAM AND INSERTION STENT URETERAL;  Surgeon: Tyson Krueger MD;  Location: [facility] MAIN OR;  Service: Urology    MN CYSTOURETHROSCOPY,URETER CATHETER Left 4/21/2020    Procedure: CYSTOSCOPY WITH INSERTION STENT URETERAL;  Surgeon: Julianna Sarah MD;  Location: [facility];  Service: Urology     Social History   Social History     Substance and Sexual Activity   Alcohol Use Never    Alcohol/week: 0 0 standard drinks    Frequency: Never     Social History     Substance and Sexual Activity   Drug Use Never     Social History     Tobacco Use   Smoking Status Never Smoker   Smokeless Tobacco Never Used       Family History:   Family History   Problem Relation Age of Onset    Cancer Mother    Luciana De Luna Hypertension Father     Cancer Brother     Cancer Maternal Grandmother     Cancer Paternal Aunt        Medications:  Pertinent medications were reviewed  Current Facility-Administered Medications   Medication Dose Route Frequency Provider Last Rate    acetaminophen  650 mg Oral Q6H PRN Kathleen Aaln MD      ascorbic acid  1,000 mg Oral Q12H Humza Peters MD      aspirin  81 mg Oral Daily Kathleen Alan MD      b complex-vitamin C-folic acid  1 capsule Oral Daily With Nancie Barahona MD      cefTRIAXone  1,000 mg Intravenous Q24H Kathleen Alan MD 1,000 mg (01/29/21 1309)    cholecalciferol  2,000 Units Oral Daily Kathleen Alan MD      colchicine  0 3 mg Oral Daily Kathleen Alan MD      vitamin B-12  100 mcg Oral Daily Kathleen Alan MD      famotidine  20 mg Intravenous Q24H Humza Peters MD      heparin (porcine)  5,000 Units Subcutaneous [facility] Kathleen Alan MD      HYDROmorphone  0 2 mg Intravenous Q4H PRN Kathleen Alan MD      insulin lispro  1-6 Units Subcutaneous 4x Daily (AC & HS) Kathleen Aaln MD      magnesium oxide  200 mg Oral BID Kathleen Alan MD      zinc sulfate  220 mg Oral Daily Kathleen Alan MD      Followed by   Chika Vieira ON 2/1/2021] multivitamin-minerals  1 tablet Oral Daily Kathleen Alan MD      NIFEdipine  60 mg Oral Daily Kathleen Alan MD      ondansetron  4 mg Intravenous Q4H PRN Kathleen Alan MD      sodium bicarbonate  325 mg Oral BID Kathleen Alan MD      tamsulosin  0 4 mg Oral Daily With Nancie Barahona MD      vancomycin  125 mg Oral Q12H Humza Peters MD           No Known Allergies      Vitals:   /73 (BP Location: Right arm)   Pulse 90   Temp 98 2 °F (36 8 °C) (Temporal)   Resp (!) 24   Ht 5' 8" (1 727 m)   Wt 70 1 kg (154 lb 8 7 oz)   SpO2 96%   BMI 23 50 kg/m²   Body mass index is 23 5 kg/m²    SpO2: 96 %,   SpO2 Activity: At Rest,   O2 Device: None (Room air)      Intake/Output Summary (Last 24 hours) at 1/29/2021 1324  Last data filed at 1/29/2021 1311  Gross per 24 hour   Intake 1470 41 ml   Output 1100 ml   Net 370 41 ml     Invasive Devices     Central Venous Catheter Line            CVC Central Lines 01/28/21 Triple Right Internal jugular less than 1 day          Drain            Urethral Catheter -- days                Physical Exam  General: conscious, cooperative, in no acute distress, chronically ill-appearing  Eyes: conjunctivae pink, anicteric sclerae  ENT: lips and mucous membranes moist  Neck: supple, no JVD, no masses  Chest: clear breath sounds bilateral, no crackles, ronchus or wheezings  CVS: S1 & S2, normal rate, regular rhythm  Abdomen: soft, non-tender, non-distended, normoactive bowel sounds  Extremities: no edema of both legs  Skin: no rash, pale  Neuro: awake, alert, oriented      Diagnostic Data:  Lab: I have personally reviewed pertinent lab results  ,   CBC:  Results from last 7 days   Lab Units 01/29/21  0455   WBC Thousand/uL 6 60   HEMOGLOBIN g/dL 9 3*   HEMATOCRIT % 28 4*   PLATELETS Thousands/uL 202      CMP:   Lab Results   Component Value Date    SODIUM 143 01/29/2021    K 3 9 01/29/2021     (H) 01/29/2021    CO2 17 (L) 01/29/2021    BUN 53 (H) 01/29/2021    CREATININE 5 09 (H) 01/29/2021    CALCIUM 7 9 (L) 01/29/2021    AST 15 01/29/2021    ALT 9 01/29/2021    ALKPHOS 66 01/29/2021    EGFR 10 01/29/2021   ,   PT/INR: No results found for: PT, INR,   Magnesium: No components found for: MAG,  Phosphorous: No results found for: PHOS    Microbiology:  @LABRCNTIP,(urinecx:7)@        RIKA Barker    Portions of the record may have been created with voice recognition software  Occasional wrong word or "sound a like" substitutions may have occurred due to the inherent limitations of voice recognition software  Read the chart carefully and recognize, using context, where substitutions have occurred

## 2021-01-29 NOTE — ASSESSMENT & PLAN NOTE
· Patient had an episode of vasovagal syncope on 01/28/2021    Patient was bearing down while having a bowel movement on the bedside commode when he became dizzy/lightheaded and then briefly with decreased level of responsiveness  · Troponin was negative  · Patient was monitored in the ICU overnight and remained stable  · Appears to be back to baseline at this time  · Will continue IV fluids  · Downgrade to med surg

## 2021-01-29 NOTE — ASSESSMENT & PLAN NOTE
· Likely exacerbated by COVID infection    Baseline creatinine appears to be around 4-5  · Creatinine gradually improving  · Will continue gentle hydration  · Lasix held for now  · Nephrology input appreciated  · Avoid any nephrotoxic meds  · Follow-up with PCP and Nephrology as outpatient for routine lab monitoring

## 2021-01-29 NOTE — PROGRESS NOTES
Progress Note - Corin Greenwood 1940, 79 y o  male MRN: [id number]    Unit/Bed#: ICU 03 Encounter: [id number]    Primary Care Provider: RIKA Shah   Date and time admitted to hospital: 1/24/2021  4:30 PM        * Pneumonia due to COVID-19 virus  Assessment & Plan  · Will continue treatment under mild algorithm, patient currently not requiring oxygen  · Patient completed course of remdesivir  · Patient was not started on dexamethasone or plasma therapy given that he is not requiring oxygen at this time  · Antibiotics discontinued as procalcitonin is normal  · Continue supportive care    Acute renal failure superimposed on stage 5 chronic kidney disease, not on chronic dialysis [facility])  Assessment & Plan  · Likely exacerbated by COVID infection  Baseline creatinine appears to be around 4-5  · Creatinine gradually improving  · Will continue gentle hydration  · Lasix held for now  · Nephrology input appreciated  · Avoid any nephrotoxic meds  · Follow-up with PCP and Nephrology as outpatient for routine lab monitoring    Vasovagal episode  Assessment & Plan  · Patient had an episode of vasovagal syncope on 01/28/2021    Patient was bearing down while having a bowel movement on the bedside commode when he became dizzy/lightheaded and then briefly with decreased level of responsiveness  · Troponin was negative  · Patient was monitored in the ICU overnight and remained stable  · Appears to be back to baseline at this time  · Will continue IV fluids  · Downgrade to med surg    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 [facility])  Assessment & Plan  · 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  · Follows with Dr Chad Arambula, on monthly injectable    Essential hypertension  Assessment & Plan  · BP stable with intermittent periods of accelerated hypertension  · Continue nifedipine    Type 2 diabetes mellitus with hyperglycemia, with long-term current use of insulin ([facility] [address] )  Assessment & Plan  · Continue insulin sliding scale      VTE Prophylaxis:  Heparin    Patient Centered Rounds: I have performed bedside rounds with nursing staff today  Discussions with Specialists or Other Care Team Provider: yes  Education and Discussions with Family / Patient:  Attempted to call patient's significant other Ezio Sneed over the phone with no answer at number provided in chart  Message left    Current Length of Stay: 5 day(s)    Current Patient Status: Inpatient   Certification Statement: The patient will continue to require additional inpatient hospital stay due to COVID infection    Discharge Plan:  hopeful discharge planning in the next 24 hours    Code Status: Level 1 - Full Code    Subjective:   Patient had a syncopal episode yesterday while having a bowel movement  Patient was monitored in the ICU and has been stable overnight  Denies any complaints this morning    Objective:     Vitals:   Temp (24hrs), Av 3 °F (35 7 °C), Min:96 °F (35 6 °C), Max:97 °F (36 1 °C)    Temp:  [96 °F (35 6 °C)-97 °F (36 1 °C)] 96 3 °F (35 7 °C)  HR:  [56-91] 80  Resp:  [12-22] 17  BP: (122-182)/(56-90) 154/71  SpO2:  [90 %-99 %] 90 %  Body mass index is 23 5 kg/m²  Input and Output Summary (last 24 hours): Intake/Output Summary (Last 24 hours) at 2021 0842  Last data filed at 2021 0400  Gross per 24 hour   Intake 1110 41 ml   Output 600 ml   Net 510 41 ml       Physical Exam:   Physical Exam  Constitutional:       General: He is not in acute distress  Comments: Frail elderly male   HENT:      Head: Normocephalic and atraumatic  Nose: Nose normal       Mouth/Throat:      Mouth: Mucous membranes are dry  Eyes:      Extraocular Movements: Extraocular movements intact  Conjunctiva/sclera: Conjunctivae normal    Neck:      Musculoskeletal: Normal range of motion and neck supple  Cardiovascular:      Rate and Rhythm: Normal rate and regular rhythm     Pulmonary:      Effort: Pulmonary effort is normal  No respiratory distress  Abdominal:      Palpations: Abdomen is soft  Tenderness: There is no abdominal tenderness  Musculoskeletal:      Comments: Generalized weakness   Skin:     General: Skin is warm  Coloration: Skin is not jaundiced  Neurological:      General: No focal deficit present  Cranial Nerves: No cranial nerve deficit  Psychiatric:         Mood and Affect: Mood normal          Behavior: Behavior normal          Additional Data:     Labs:    Results from last 7 days   Lab Units 01/29/21  0455   WBC Thousand/uL 6 60   HEMOGLOBIN g/dL 9 3*   HEMATOCRIT % 28 4*   PLATELETS Thousands/uL 202   NEUTROS PCT % 68   LYMPHS PCT % 20*   MONOS PCT % 11   EOS PCT % 0     Results from last 7 days   Lab Units 01/29/21  0455   SODIUM mmol/L 143   POTASSIUM mmol/L 3 9   CHLORIDE mmol/L 112*   CO2 mmol/L 17*   BUN mg/dL 53*   CREATININE mg/dL 5 09*   CALCIUM mg/dL 7 9*   ALK PHOS U/L 66   ALT U/L 9   AST U/L 15     Results from last 7 days   Lab Units 01/24/21  1800   INR  1 08     Results from last 7 days   Lab Units 01/29/21  0534 01/29/21  0006 01/28/21  2146 01/28/21  1635 01/28/21  1519 01/28/21  1059 01/28/21  0602 01/27/21  2058 01/27/21  1531 01/27/21  1105 01/27/21  0700 01/26/21  2034   POC GLUCOSE mg/dl 138 340* 169* 162* 190* 462* 149* 192* 176* 157* 141* 212*           * I Have Reviewed All Lab Data Listed Above  * Additional Pertinent Lab Tests Reviewed:  [facility] Admission  Reviewed    Imaging:  Imaging Reports Reviewed Today Include:  No new imaging    Recent Cultures (last 7 days):     Results from last 7 days   Lab Units 01/25/21  0537   URINE CULTURE  No Growth <1000 cfu/mL       Last 24 Hours Medication List:   Current Facility-Administered Medications   Medication Dose Route Frequency Provider Last Rate    acetaminophen  650 mg Oral Q6H PRN Thu Brannon MD      ascorbic acid  1,000 mg Oral Q12H Keri Haynes MD      aspirin  81 mg Oral Daily Thu Brannon MD  b complex-vitamin C-folic acid  1 capsule Oral Daily With Nini Ramirez MD      cefTRIAXone  1,000 mg Intravenous Q24H David Satples MD 1,000 mg (01/28/21 1310)    cholecalciferol  2,000 Units Oral Daily David Staples MD      colchicine  0 3 mg Oral Daily David Staples MD      vitamin B-12  100 mcg Oral Daily David Staples MD      famotidine  20 mg Intravenous Q24H Kalin Blanco MD      heparin (porcine)  5,000 Units Subcutaneous [facility] David Staples MD      HYDROmorphone  0 2 mg Intravenous Q4H PRN David Staples MD      insulin lispro  1-6 Units Subcutaneous 4x Daily (AC & HS) David Staples MD      magnesium oxide  200 mg Oral BID David Staples MD      multi-electrolyte  75 mL/hr Intravenous Once David Staples MD      zinc sulfate  220 mg Oral Daily David Staples MD      Followed by   Obdulia Barraza ON 2/1/2021] multivitamin-minerals  1 tablet Oral Daily David Staples MD      NIFEdipine  60 mg Oral Daily David Staples MD      ondansetron  4 mg Intravenous Q4H PRN David Staples MD      sodium bicarbonate  325 mg Oral BID David Staples MD      tamsulosin  0 4 mg Oral Daily With Nini Ramirez MD      vancomycin  125 mg Oral Q12H Kalin Blanco MD          Today, Patient Was Seen By: David Staples MD    ** Please Note: Dictation voice to text software may have been used in the creation of this document   **

## 2021-01-29 NOTE — NURSING NOTE
Patient awake, alert and oriented x4  Denies discomfort  Complex physical assessment as noted, see chart for details  Call bell in reach

## 2021-01-29 NOTE — ASSESSMENT & PLAN NOTE
· 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  · Follows with Dr Priscilla Brunner, on monthly injectable

## 2021-01-29 NOTE — ASSESSMENT & PLAN NOTE
· Will continue treatment under mild algorithm, patient currently not requiring oxygen  · Patient completed course of remdesivir  · Patient was not started on dexamethasone or plasma therapy given that he is not requiring oxygen at this time  · Antibiotics discontinued as procalcitonin is normal  · Continue supportive care

## 2021-01-29 NOTE — CASE MANAGEMENT
As per SLIM the pt will be able to be discharged tomorrow to home  Pt will resume [facility]  SO Shruthi Espinoza will transport

## 2021-01-29 NOTE — NURSING NOTE
Patient will be transferred to med surg unit  Report given to [facility] ABELINO

## 2021-01-30 NOTE — ASSESSMENT & PLAN NOTE
· Likely exacerbated by COVID infection    Baseline creatinine appears to be around 4-5  · Creatinine bumped up today  · Will continue gentle hydration as needed  · Lasix held for now  · Nephrology input appreciated  · Avoid any nephrotoxic meds  · Follow-up with PCP and Nephrology as outpatient for routine lab monitoring

## 2021-01-30 NOTE — PLAN OF CARE
Problem: PAIN - ADULT  Goal: Verbalizes/displays adequate comfort level or baseline comfort level  Description: Interventions:  - Encourage patient to monitor pain and request assistance  - Assess pain using appropriate pain scale  - Administer analgesics based on type and severity of pain and evaluate response  - Implement non-pharmacological measures as appropriate and evaluate response  - Consider cultural and social influences on pain and pain management  - Notify physician/advanced practitioner if interventions unsuccessful or patient reports new pain  Outcome: Not Progressing     Problem: INFECTION - ADULT  Goal: Absence or prevention of progression during hospitalization  Description: INTERVENTIONS:  - Assess and monitor for signs and symptoms of infection  - Monitor lab/diagnostic results  - Monitor all insertion sites, i e  indwelling lines, tubes, and drains  - Monitor endotracheal if appropriate and nasal secretions for changes in amount and color  - [place] appropriate cooling/warming therapies per order  - Administer medications as ordered  - Instruct and encourage patient and family to use good hand hygiene technique  - Identify and instruct in appropriate isolation precautions for identified infection/condition  Outcome: Not Progressing  Goal: Absence of fever/infection during neutropenic period  Description: INTERVENTIONS:  - Monitor WBC    Outcome: Not Progressing     Problem: SAFETY ADULT  Goal: Patient will remain free of falls  Description: INTERVENTIONS:  - Assess patient frequently for physical needs  -  Identify cognitive and physical deficits and behaviors that affect risk of falls    -  [place] fall precautions as indicated by assessment   - Educate patient/family on patient safety including physical limitations  - Instruct patient to call for assistance with activity based on assessment  - Modify environment to reduce risk of injury  - Consider OT/PT consult to assist with strengthening/mobility  Outcome: Not Progressing  Goal: Maintain or return to baseline ADL function  Description: INTERVENTIONS:  -  Assess patient's ability to carry out ADLs; assess patient's baseline for ADL function and identify physical deficits which impact ability to perform ADLs (bathing, care of mouth/teeth, toileting, grooming, dressing, etc )  - Assess/evaluate cause of self-care deficits   - Assess range of motion  - Assess patient's mobility; develop plan if impaired  - Assess patient's need for assistive devices and provide as appropriate  - Encourage maximum independence but intervene and supervise when necessary  - Involve family in performance of ADLs  - Assess for home care needs following discharge   - Consider OT consult to assist with ADL evaluation and planning for discharge  - Provide patient education as appropriate  Outcome: Not Progressing  Goal: Maintain or return mobility status to optimal level  Description: INTERVENTIONS:  - Assess patient's baseline mobility status (ambulation, transfers, stairs, etc )    - Identify cognitive and physical deficits and behaviors that affect mobility  - Identify mobility aids required to assist with transfers and/or ambulation (gait belt, sit-to-stand, lift, walker, cane, etc )  - [place] fall precautions as indicated by assessment  - Record patient progress and toleration of activity level on Mobility SBAR; progress patient to next Phase/Stage  - Instruct patient to call for assistance with activity based on assessment  - Consider rehabilitation consult to assist with strengthening/weightbearing, etc   Outcome: Not Progressing     Problem: DISCHARGE PLANNING  Goal: Discharge to home or other facility with appropriate resources  Description: INTERVENTIONS:  - Identify barriers to discharge w/patient and caregiver  - Arrange for needed discharge resources and transportation as appropriate  - Identify discharge learning needs (meds, wound care, etc )  - Arrange for interpretive services to assist at discharge as needed  - Refer to Case Management Department for coordinating discharge planning if the patient needs post-hospital services based on physician/advanced practitioner order or complex needs related to functional status, cognitive ability, or social support system  Outcome: Not Progressing     Problem: Knowledge Deficit  Goal: Patient/family/caregiver demonstrates understanding of disease process, treatment plan, medications, and discharge instructions  Description: Complete learning assessment and assess knowledge base    Interventions:  - Provide teaching at level of understanding  - Provide teaching via preferred learning methods  Outcome: Not Progressing     Problem: NEUROSENSORY - ADULT  Goal: Achieves stable or improved neurological status  Description: INTERVENTIONS  - Monitor and report changes in neurological status  - Monitor vital signs such as temperature, blood pressure, glucose, and any other labs ordered   - Initiate measures to prevent increased intracranial pressure  - Monitor for seizure activity and implement precautions if appropriate      Outcome: Not Progressing  Goal: Remains free of injury related to seizures activity  Description: INTERVENTIONS  - Maintain airway, patient safety  and administer oxygen as ordered  - Monitor patient for seizure activity, document and report duration and description of seizure to physician/advanced practitioner  - If seizure occurs,  ensure patient safety during seizure  - Reorient patient post seizure  - Seizure pads on all 4 side rails  - Instruct patient/family to notify RN of any seizure activity including if an aura is experienced  - Instruct patient/family to call for assistance with activity based on nursing assessment  - Administer anti-seizure medications if ordered    Outcome: Not Progressing  Goal: Achieves maximal functionality and self care  Description: INTERVENTIONS  - Monitor swallowing and airway patency with patient fatigue and changes in neurological status  - Encourage and assist patient to increase activity and self care     - Encourage visually impaired, hearing impaired and aphasic patients to use assistive/communication devices  Outcome: Not Progressing     Problem: CARDIOVASCULAR - ADULT  Goal: Maintains optimal cardiac output and hemodynamic stability  Description: INTERVENTIONS:  - Monitor I/O, vital signs and rhythm  - Monitor for S/S and trends of decreased cardiac output  - Administer and titrate ordered vasoactive medications to optimize hemodynamic stability  - Assess quality of pulses, skin color and temperature  - Assess for signs of decreased coronary artery perfusion  - Instruct patient to report change in severity of symptoms  Outcome: Not Progressing  Goal: Absence of cardiac dysrhythmias or at baseline rhythm  Description: INTERVENTIONS:  - Continuous cardiac monitoring, vital signs, obtain 12 lead EKG if ordered  - Administer antiarrhythmic and heart rate control medications as ordered  - Monitor electrolytes and administer replacement therapy as ordered  Outcome: Not Progressing     Problem: RESPIRATORY - ADULT  Goal: Achieves optimal ventilation and oxygenation  Description: INTERVENTIONS:  - Assess for changes in respiratory status  - Assess for changes in mentation and behavior  - Position to facilitate oxygenation and minimize respiratory effort  - Oxygen administered by appropriate delivery if ordered  - Initiate smoking cessation education as indicated  - Encourage broncho-pulmonary hygiene including cough, deep breathe, Incentive Spirometry  - Assess the need for suctioning and aspirate as needed  - Assess and instruct to report SOB or any respiratory difficulty  - Respiratory Therapy support as indicated  Outcome: Not Progressing     Problem: GASTROINTESTINAL - ADULT  Goal: Minimal or absence of nausea and/or vomiting  Description: INTERVENTIONS:  - Administer IV fluids if ordered to ensure adequate hydration  - Maintain NPO status until nausea and vomiting are resolved  - Nasogastric tube if ordered  - Administer ordered antiemetic medications as needed  - Provide nonpharmacologic comfort measures as appropriate  - Advance diet as tolerated, if ordered  - Consider nutrition services referral to assist patient with adequate nutrition and appropriate food choices  Outcome: Not Progressing  Goal: Maintains or returns to baseline bowel function  Description: INTERVENTIONS:  - Assess bowel function  - Encourage oral fluids to ensure adequate hydration  - Administer IV fluids if ordered to ensure adequate hydration  - Administer ordered medications as needed  - Encourage mobilization and activity  - Consider nutritional services referral to assist patient with adequate nutrition and appropriate food choices  Outcome: Not Progressing  Goal: Maintains adequate nutritional intake  Description: INTERVENTIONS:  - Monitor percentage of each meal consumed  - Identify factors contributing to decreased intake, treat as appropriate  - Assist with meals as needed  - Monitor I&O, weight, and lab values if indicated  - Obtain nutrition services referral as needed  Outcome: Not Progressing  Goal: Establish and maintain optimal ostomy function  Description: INTERVENTIONS:  - Assess bowel function  - Encourage oral fluids to ensure adequate hydration  - Administer IV fluids if ordered to ensure adequate hydration   - Administer ordered medications as needed  - Encourage mobilization and activity  - Nutrition services referral to assist patient with appropriate food choices  - Assess stoma site  - Consider wound care consult   Outcome: Not Progressing     Problem: GENITOURINARY - ADULT  Goal: Maintains or returns to baseline urinary function  Description: INTERVENTIONS:  - Assess urinary function  - Encourage oral fluids to ensure adequate hydration if ordered  - Administer IV fluids as ordered to ensure adequate hydration  - Administer ordered medications as needed  - Offer frequent toileting  - Follow urinary retention protocol if ordered  Outcome: Not Progressing  Goal: Absence of urinary retention  Description: INTERVENTIONS:  - Assess patients ability to void and empty bladder  - Monitor I/O  - Bladder scan as needed  - Discuss with physician/AP medications to alleviate retention as needed  - Discuss catheterization for long term situations as appropriate  Outcome: Not Progressing  Goal: Urinary catheter remains patent  Description: INTERVENTIONS:  - Assess patency of urinary catheter  - If patient has a chronic garcia, consider changing catheter if non-functioning  - Follow guidelines for intermittent irrigation of non-functioning urinary catheter  Outcome: Not Progressing     Problem: METABOLIC, FLUID AND ELECTROLYTES - ADULT  Goal: Electrolytes maintained within normal limits  Description: INTERVENTIONS:  - Monitor labs and assess patient for signs and symptoms of electrolyte imbalances  - Administer electrolyte replacement as ordered  - Monitor response to electrolyte replacements, including repeat lab results as appropriate  - Instruct patient on fluid and nutrition as appropriate  Outcome: Not Progressing  Goal: Fluid balance maintained  Description: INTERVENTIONS:  - Monitor labs   - Monitor I/O and WT  - Instruct patient on fluid and nutrition as appropriate  - Assess for signs & symptoms of volume excess or deficit  Outcome: Not Progressing  Goal: Glucose maintained within target range  Description: INTERVENTIONS:  - Monitor Blood Glucose as ordered  - Assess for signs and symptoms of hyperglycemia and hypoglycemia  - Administer ordered medications to maintain glucose within target range  - Assess nutritional intake and initiate nutrition service referral as needed  Outcome: Not Progressing     Problem: SKIN/TISSUE INTEGRITY - ADULT  Goal: Skin integrity remains intact  Description: INTERVENTIONS  - Identify patients at risk for skin breakdown  - Assess and monitor skin integrity  - Assess and monitor nutrition and hydration status  - Monitor labs (i e  albumin)  - Assess for incontinence   - Turn and reposition patient  - Assist with mobility/ambulation  - Relieve pressure over bony prominences  - Avoid friction and shearing  - Provide appropriate hygiene as needed including keeping skin clean and dry  - Evaluate need for skin moisturizer/barrier cream  - Collaborate with interdisciplinary team (i e  Nutrition, Rehabilitation, etc )   - Patient/family teaching  Outcome: Not Progressing  Goal: Incision(s), wounds(s) or drain site(s) healing without S/S of infection  Description: INTERVENTIONS  - Assess and document risk factors for skin impairment   - Assess and document dressing, incision, wound bed, drain sites and surrounding tissue  - Consider nutrition services referral as needed  - Oral mucous membranes remain intact  - Provide patient/ family education  Outcome: Not Progressing  Goal: Oral mucous membranes remain intact  Description: INTERVENTIONS  - Assess oral mucosa and hygiene practices  - Implement preventative oral hygiene regimen  - Implement oral medicated treatments as ordered  - Initiate Nutrition services referral as needed  Outcome: Not Progressing     Problem: HEMATOLOGIC - ADULT  Goal: Maintains hematologic stability  Description: INTERVENTIONS  - Assess for signs and symptoms of bleeding or hemorrhage  - Monitor labs  - Administer supportive blood products/factors as ordered and appropriate  Outcome: Not Progressing     Problem: MUSCULOSKELETAL - ADULT  Goal: Maintain or return mobility to safest level of function  Description: INTERVENTIONS:  - Assess patient's ability to carry out ADLs; assess patient's baseline for ADL function and identify physical deficits which impact ability to perform ADLs (bathing, care of mouth/teeth, toileting, grooming, dressing, etc )  - Assess/evaluate cause of self-care deficits   - Assess range of motion  - Assess patient's mobility  - Assess patient's need for assistive devices and provide as appropriate  - Encourage maximum independence but intervene and supervise when necessary  - Involve family in performance of ADLs  - Assess for home care needs following discharge   - Consider OT consult to assist with ADL evaluation and planning for discharge  - Provide patient education as appropriate  Outcome: Not Progressing  Goal: Maintain proper alignment of affected body part  Description: INTERVENTIONS:  - Support, maintain and protect limb and body alignment  - Provide patient/ family with appropriate education  Outcome: Not Progressing

## 2021-01-30 NOTE — PROGRESS NOTES
Progress Note - Kassidy Romero 1940, 79 y o  male MRN: [id number]    Unit/Bed#: -01 Encounter: [id number]    Primary Care Provider: RIKA Bauman   Date and time admitted to hospital: 1/24/2021  4:30 PM        * Pneumonia due to COVID-19 virus  Assessment & Plan  · Will continue treatment under mild algorithm, patient currently not requiring oxygen  · Patient completed course of remdesivir  · Patient was not started on dexamethasone or plasma therapy given that he is not requiring oxygen at this time  · Antibiotics discontinued as procalcitonin is normal  · Continue supportive care    Acute renal failure superimposed on stage 5 chronic kidney disease, not on chronic dialysis [facility])  Assessment & Plan  · Likely exacerbated by COVID infection  Baseline creatinine appears to be around 4-5  · Creatinine bumped up today  · Will continue gentle hydration as needed  · Lasix held for now  · Nephrology input appreciated  · Avoid any nephrotoxic meds  · Follow-up with PCP and Nephrology as outpatient for routine lab monitoring    Vasovagal episode  Assessment & Plan  · Patient had an episode of vasovagal syncope on 01/28/2021  Patient was bearing down while having a bowel movement on the bedside commode when he became dizzy/lightheaded and then briefly with decreased level of responsiveness  · Troponin was negative  · Patient was monitored in the ICU overnight and remained stable  · Appears to be back to baseline at this time  · Will continue IV fluids as needed    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 [facility])  Assessment & Plan  · 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  · Follows with Dr ePte Cheema, on monthly injectable    Essential hypertension  Assessment & Plan  · BP stable with intermittent periods of accelerated hypertension  · Continue nifedipine  · Hydralazine p r n      Type 2 diabetes mellitus with hyperglycemia, with long-term current use of insulin (HCC)  Assessment & Plan  · Continue insulin sliding scale      VTE Prophylaxis:  Heparin    Patient Centered Rounds: I have performed bedside rounds with nursing staff today  Discussions with Specialists or Other Care Team Provider: yes  Education and Discussions with Family / Patient:  Spoke with patient's significant other over the phone regarding plan of care    Current Length of Stay: 6 day(s)    Current Patient Status: Inpatient   Certification Statement: The patient will continue to require additional inpatient hospital stay due to COVID infection    Discharge Plan:  Pending hospital course    Code Status: Level 1 - Full Code    Subjective:   Patient with weakness and fatigue  Decreased appetite  Denies any pain complaints  Objective:     Vitals:   Temp (24hrs), Av 9 °F (36 6 °C), Min:97 8 °F (36 6 °C), Max:97 9 °F (36 6 °C)    Temp:  [97 8 °F (36 6 °C)-97 9 °F (36 6 °C)] 97 9 °F (36 6 °C)  HR:  [82-88] 82  Resp:  [18-20] 20  BP: (128-182)/(72-84) 136/78  SpO2:  [94 %-95 %] 95 %  Body mass index is 23 5 kg/m²  Input and Output Summary (last 24 hours):     No intake or output data in the 24 hours ending 21 1532    Physical Exam:   Physical Exam  Constitutional:       General: He is not in acute distress  HENT:      Head: Normocephalic and atraumatic  Nose: Nose normal       Mouth/Throat:      Mouth: Mucous membranes are dry  Eyes:      Extraocular Movements: Extraocular movements intact  Conjunctiva/sclera: Conjunctivae normal    Neck:      Musculoskeletal: Normal range of motion and neck supple  Cardiovascular:      Rate and Rhythm: Normal rate and regular rhythm  Pulmonary:      Effort: Pulmonary effort is normal  No respiratory distress  Abdominal:      Palpations: Abdomen is soft  Tenderness: There is no abdominal tenderness  Genitourinary:     Comments: Haile catheter in place  Musculoskeletal:      Comments: Generalized weakness   Neurological:      General: No focal deficit present  Mental Status: He is alert  Cranial Nerves: No cranial nerve deficit  Psychiatric:         Mood and Affect: Mood is depressed  Additional Data:     Labs:    Results from last 7 days   Lab Units 01/30/21  0530   WBC Thousand/uL 5 80   HEMOGLOBIN g/dL 8 1*   HEMATOCRIT % 24 2*   PLATELETS Thousands/uL 198   NEUTROS PCT % 70   LYMPHS PCT % 18*   MONOS PCT % 11   EOS PCT % 1     Results from last 7 days   Lab Units 01/30/21  0530  01/29/21  0455   SODIUM mmol/L 144*   < > 143   POTASSIUM mmol/L 3 6   < > 3 9   CHLORIDE mmol/L 113*   < > 112*   CO2 mmol/L 21   < > 17*   BUN mg/dL 53*   < > 53*   CREATININE mg/dL 5 08*   < > 5 09*   CALCIUM mg/dL 7 9*   < > 7 9*   ALK PHOS U/L  --   --  66   ALT U/L  --   --  9   AST U/L  --   --  15    < > = values in this interval not displayed  Results from last 7 days   Lab Units 01/24/21  1800   INR  1 08     Results from last 7 days   Lab Units 01/30/21  1227 01/30/21  0535 01/29/21  2054 01/29/21  1723 01/29/21  1121 01/29/21  0534 01/29/21  0006 01/28/21  2146 01/28/21  1635 01/28/21  1519 01/28/21  1059 01/28/21  0602   POC GLUCOSE mg/dl 174* 112 171* 242* 193* 138 340* 169* 162* 190* 462* 149*           * I Have Reviewed All Lab Data Listed Above  * Additional Pertinent Lab Tests Reviewed:  Laura 66 Admission  Reviewed    Imaging:  Imaging Reports Reviewed Today Include:  No new imaging    Recent Cultures (last 7 days):     Results from last 7 days   Lab Units 01/25/21  0537   URINE CULTURE  No Growth <1000 cfu/mL       Last 24 Hours Medication List:   Current Facility-Administered Medications   Medication Dose Route Frequency Provider Last Rate    acetaminophen  650 mg Oral Q6H PRN Inder Quach MD      ascorbic acid  1,000 mg Oral Q12H Mare Skaggs MD      aspirin  81 mg Oral Daily Inder Quach MD      b complex-vitamin C-folic acid  1 capsule Oral Daily With Hortencia Preez MD      cholecalciferol  2,000 Units Oral Daily Willy Epstein MD      colchicine  0 3 mg Oral Daily Willy Epstein MD      vitamin B-12  100 mcg Oral Daily Willy Epstein MD      famotidine  20 mg Intravenous Q24H Nolan Briceño MD      heparin (porcine)  5,000 Units Subcutaneous [facility] Willy Epstein MD      hydrALAZINE  5 mg Intravenous Q8H PRN Willy Epstein MD      HYDROmorphone  0 2 mg Intravenous Q4H PRN Willy Epstein MD      insulin lispro  1-6 Units Subcutaneous 4x Daily (AC & HS) Willy Epstein MD      magnesium oxide  200 mg Oral BID Willy Epstein MD      zinc sulfate  220 mg Oral Daily Willy Epstein MD      Followed by   Leslye Martines ON 2/1/2021] multivitamin-minerals  1 tablet Oral Daily Willy Epstein MD      NIFEdipine  60 mg Oral Daily Willy Epstein MD      ondansetron  4 mg Intravenous Q4H PRN Willy Epstein MD      potassium chloride  20 mEq Oral Once RIKA Sapp      sodium bicarbonate  325 mg Oral BID Willy Epstein MD      tamsulosin  0 4 mg Oral Daily With Marlyn Keith MD          Today, Patient Was Seen By: Willy Epstein MD    ** Please Note: Dictation voice to text software may have been used in the creation of this document   **

## 2021-01-30 NOTE — ASSESSMENT & PLAN NOTE
· Will monitor hemoglobin level  · No signs of acute bleeding at this time  · Will transfuse as needed

## 2021-01-30 NOTE — ASSESSMENT & PLAN NOTE
· Patient had an episode of vasovagal syncope on 01/28/2021    Patient was bearing down while having a bowel movement on the bedside commode when he became dizzy/lightheaded and then briefly with decreased level of responsiveness  · Troponin was negative  · Patient was monitored in the ICU overnight and remained stable  · Appears to be back to baseline at this time  · Will continue IV fluids as needed

## 2021-01-30 NOTE — PROGRESS NOTES
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 Kidney function stable and GFR seems to have plateaued around 10 mils per minute  Volume expansion has been discontinued and can monitor without for now  If diarrhea continues can restart  Seems to be eating and drinking better today  Hgb dropping 11 -> 9 -> 8 ? Dilutional component - no overt signs of bleeding   ? Continue to avoid nephrotoxins, maximize hemodynamics  2  Stage 5 chronic kidney disease, medically managed without hemodialysis, with baseline creatinine around 4 5 mg/dL  3  Acidosis  ? Improved  Continue low dose bicarbonate  4  Urinary retention  ? Chronic- continue indwelling urinary catheter  5  Encephalopathy  ? Patient is awake, alert but disoriented to a few things  Apparently having some confusion earlier  Do not feel this is due to uremia or renal component  Further work-up if needed per primary team    6  Hypertensive nephrosclerosis  ? Higher blood pressures yesterday but more acceptable today  7  Hypokalemia  · Give 20 mEq oral k today  8  COVID-19  ? Continue care according to hospitalist  9  Metastatic neuroendocrine carcinoma       ROS  No physical complaints  States that he had a bad day  A complete 10 point review of systems have been performed and are otherwise negative         Historical Information   Past Medical History:   Diagnosis Date    Acute pancreatitis without infection or necrosis 9/26/2019    Age-related nuclear cataract, right 11/5/2020    Anemia of chronic renal failure     BPH (benign prostatic hyperplasia)     CKD (chronic kidney disease) stage 5, GFR less than 15 ml/min (HCC)     Coronary artery disease     DJD (degenerative joint disease)     Essential hypertension     Gait disturbance     Generalized weakness     GERD (gastroesophageal reflux disease)     Gout     History of transfusion     Hydronephrosis     Hyperlipidemia     Hypertension     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)     Pressure injury of skin     Proteinuria     Thrombophlebitis migrans     Type 2 diabetes mellitus      Past Surgical History:   Procedure Laterality Date    CATARACT EXTRACTION Right 11/05/2020    CATARACT EXTRACTION W/ INTRAOCULAR LENS IMPLANT Right 11/5/2020    Procedure: EYE OD CATARACT EXTRACTION WITH IOL INSERTION;  Surgeon: Mirella Spear MD;  Location: [address] [place] [address] MAIN OR;  Service: Ophthalmology    CHOLECYSTECTOMY     [address] [place] [address] N/A 2/7/2020    Procedure: CHOLECYSTECTOMY LAPAROSCOPIC;  Surgeon:  Finn Stevenson MD;  Location: [address] [place] [address] OR;  Service: General    CORONARY ANGIOPLASTY WITH STENT PLACEMENT      FL RETROGRADE PYELOGRAM  5/21/2020    IR BIOPSY LIVER MASS  1/24/2019    IR CHOLECYSTOSTOMY TUBE PLACEMENT  9/6/2019    IR NON-TUNNELED CENTRAL LINE PLACEMENT  1/28/2021    VT CYSTO/URETERO W/LITHOTRIPSY &INDWELL STENT INSRT Left 5/21/2020    Procedure: CYSTOSCOPY URETEROSCOPY WITH LITHOTRIPSY HOLMIUM LASER, RETROGRADE PYELOGRAM AND INSERTION STENT URETERAL;  Surgeon: Ravindra Benson MD;  Location: [facility] MA[facility];  Service: Urology    VT CYSTOURETHROSCOPY,URETER CATHETER Left 4/21/2020    Procedure: CYSTOSCOPY WITH INSERTION STENT URETERAL;  Surgeon: Mio Gutierrez MD;  [facility];  Service: Urology     Social History   Social History     Substance and Sexual Activity   Alcohol Use Never    Alcohol/week: 0 0 standard drinks    Frequency: Never     Social History     Substance and Sexual Activity   Drug Use Never     Social History     Tobacco Use   Smoking Status Never Smoker   Smokeless Tobacco Never Used       Family History:   Family History   Problem Relation Age of Onset    Cancer Mother     Hypertension Father     Cancer Brother     Cancer Maternal Grandmother     Cancer Paternal Aunt        Medications:  Pertinent medications were reviewed  Current Facility-Administered Medications   Medication Dose Route Frequency Provider Last Rate    acetaminophen  650 mg Oral Q6H PRN Karen Mitchell MD      ascorbic acid  1,000 mg Oral Q12H Adri Gutiérrez MD      aspirin  81 mg Oral Daily Karen Mitchell MD      b complex-vitamin C-folic acid  1 capsule Oral Daily With Tacos Maradiaga MD      cholecalciferol  2,000 Units Oral Daily Karen Mitchell MD      colchicine  0 3 mg Oral Daily Karen Mitchell MD      vitamin B-12  100 mcg Oral Daily Karen Mitchell MD      famotidine  20 mg Intravenous Q24H Adri Gutiérrez MD      heparin (porcine)  5,000 Units Subcutaneous [facility] Adri Gutiérrez MD      hydrALAZINE  5 mg Intravenous Q8H PRN Karen Mitchell MD      HYDROmorphone  0 2 mg Intravenous Q4H PRN Karen Mitchell MD      insulin lispro  1-6 Units Subcutaneous 4x Daily (AC & HS) Karen Mitchell MD      magnesium oxide  200 mg Oral BID Karen Mitchell MD      zinc sulfate  220 mg Oral Daily Karen Mitchell MD      Followed by   Tammy Ellis ON 2/1/2021] multivitamin-minerals  1 tablet Oral Daily Karen Mitchell MD      NIFEdipine  60 mg Oral Daily Karen Mitchell MD      ondansetron  4 mg Intravenous Q4H PRN Karen Mitchell MD      sodium bicarbonate  325 mg Oral BID Karen Mitchell MD      tamsulosin  0 4 mg Oral Daily With Tacos Maradiaga MD           No Known Allergies      Vitals:   /78 (BP Location: Right arm)   Pulse 82   Temp 97 9 °F (36 6 °C) (Temporal)   Resp 20   Ht 5' 8" (1 727 m)   Wt 70 1 kg (154 lb 8 7 oz)   SpO2 95%   BMI 23 50 kg/m²   Body mass index is 23 5 kg/m²    SpO2: 95 %,   SpO2 Activity: At Rest,   O2 Device: None (Room air)    No intake or output data in the 24 hours ending 01/30/21 1412  Invasive Devices     Central Venous Catheter Line            CVC Central Lines 01/28/21 Triple Right Internal jugular 1 day          Drain            Urethral Catheter -- days                Physical Exam  General: conscious, cooperative, in no acute distress, chronically ill appearing   Eyes: conjunctivae pink, anicteric sclerae  ENT: lips and mucous membranes moist  Neck: supple, no JVD, no masses  Chest: clear breath sounds bilateral, no crackles, ronchus or wheezings  CVS: S1 & S2, normal rate, regular rhythm  Abdomen: soft, non-tender, non-distended, normoactive bowel sounds  Extremities: no edema of both legs  Skin: no rash  Neuro: awake, alert, disoriented      Diagnostic Data:  Lab: I have personally reviewed pertinent lab results  ,   CBC:  Results from last 7 days   Lab Units 01/30/21  0530   WBC Thousand/uL 5 80   HEMOGLOBIN g/dL 8 1*   HEMATOCRIT % 24 2*   PLATELETS Thousands/uL 198      CMP:   Lab Results   Component Value Date    SODIUM 144 (H) 01/30/2021    K 3 6 01/30/2021     (H) 01/30/2021    CO2 21 01/30/2021    BUN 53 (H) 01/30/2021    CREATININE 5 08 (H) 01/30/2021    CALCIUM 7 9 (L) 01/30/2021    EGFR 10 01/30/2021   ,   PT/INR: No results found for: PT, INR,   Magnesium: No components found for: MAG,  Phosphorous: No results found for: PHOS    Microbiology:  @LABRCNT,(urinecx:7)@        RIKA [facility]rera [place]    Portions of the record may have been created with voice recognition software  Occasional wrong word or "sound a like" substitutions may have occurred due to the inherent limitations of voice recognition software  Read the chart carefully and recognize, using context, where substitutions have occurred

## 2021-01-30 NOTE — ASSESSMENT & PLAN NOTE
· BP stable with intermittent periods of accelerated hypertension  · Continue nifedipine  · Hydralazine p r n

## 2021-01-31 NOTE — PLAN OF CARE
Problem: PAIN - ADULT  Goal: Verbalizes/displays adequate comfort level or baseline comfort level  Description: Interventions:  - Encourage patient to monitor pain and request assistance  - Assess pain using appropriate pain scale  - Administer analgesics based on type and severity of pain and evaluate response  - Implement non-pharmacological measures as appropriate and evaluate response  - Consider cultural and social influences on pain and pain management  - Notify physician/advanced practitioner if interventions unsuccessful or patient reports new pain  Outcome: Adequate for Discharge     Problem: INFECTION - ADULT  Goal: Absence or prevention of progression during hospitalization  Description: INTERVENTIONS:  - Assess and monitor for signs and symptoms of infection  - Monitor lab/diagnostic results  - Monitor all insertion sites, i e  indwelling lines, tubes, and drains  - Monitor endotracheal if appropriate and nasal secretions for changes in amount and color  - [place] appropriate cooling/warming therapies per order  - Administer medications as ordered  - Instruct and encourage patient and family to use good hand hygiene technique  - Identify and instruct in appropriate isolation precautions for identified infection/condition  Outcome: Adequate for Discharge  Goal: Absence of fever/infection during neutropenic period  Description: INTERVENTIONS:  - Monitor WBC    Outcome: Adequate for Discharge     Problem: SAFETY ADULT  Goal: Patient will remain free of falls  Description: INTERVENTIONS:  - Assess patient frequently for physical needs  -  Identify cognitive and physical deficits and behaviors that affect risk of falls    -  [place] fall precautions as indicated by assessment   - Educate patient/family on patient safety including physical limitations  - Instruct patient to call for assistance with activity based on assessment  - Modify environment to reduce risk of injury  - Consider OT/PT consult to assist with strengthening/mobility  Outcome: Adequate for Discharge  Goal: Maintain or return to baseline ADL function  Description: INTERVENTIONS:  -  Assess patient's ability to carry out ADLs; assess patient's baseline for ADL function and identify physical deficits which impact ability to perform ADLs (bathing, care of mouth/teeth, toileting, grooming, dressing, etc )  - Assess/evaluate cause of self-care deficits   - Assess range of motion  - Assess patient's mobility; develop plan if impaired  - Assess patient's need for assistive devices and provide as appropriate  - Encourage maximum independence but intervene and supervise when necessary  - Involve family in performance of ADLs  - Assess for home care needs following discharge   - Consider OT consult to assist with ADL evaluation and planning for discharge  - Provide patient education as appropriate  Outcome: Adequate for Discharge  Goal: Maintain or return mobility status to optimal level  Description: INTERVENTIONS:  - Assess patient's baseline mobility status (ambulation, transfers, stairs, etc )    - Identify cognitive and physical deficits and behaviors that affect mobility  - Identify mobility aids required to assist with transfers and/or ambulation (gait belt, sit-to-stand, lift, walker, cane, etc )  - [place] fall precautions as indicated by assessment  - Record patient progress and toleration of activity level on Mobility SBAR; progress patient to next Phase/Stage  - Instruct patient to call for assistance with activity based on assessment  - Consider rehabilitation consult to assist with strengthening/weightbearing, etc   Outcome: Adequate for Discharge     Problem: DISCHARGE PLANNING  Goal: Discharge to home or other facility with appropriate resources  Description: INTERVENTIONS:  - Identify barriers to discharge w/patient and caregiver  - Arrange for needed discharge resources and transportation as appropriate  - Identify discharge learning needs (meds, wound care, etc )  - Arrange for interpretive services to assist at discharge as needed  - Refer to Case Management Department for coordinating discharge planning if the patient needs post-hospital services based on physician/advanced practitioner order or complex needs related to functional status, cognitive ability, or social support system  Outcome: Adequate for Discharge     Problem: Knowledge Deficit  Goal: Patient/family/caregiver demonstrates understanding of disease process, treatment plan, medications, and discharge instructions  Description: Complete learning assessment and assess knowledge base    Interventions:  - Provide teaching at level of understanding  - Provide teaching via preferred learning methods  Outcome: Adequate for Discharge     Problem: NEUROSENSORY - ADULT  Goal: Achieves stable or improved neurological status  Description: INTERVENTIONS  - Monitor and report changes in neurological status  - Monitor vital signs such as temperature, blood pressure, glucose, and any other labs ordered   - Initiate measures to prevent increased intracranial pressure  - Monitor for seizure activity and implement precautions if appropriate      Outcome: Adequate for Discharge  Goal: Remains free of injury related to seizures activity  Description: INTERVENTIONS  - Maintain airway, patient safety  and administer oxygen as ordered  - Monitor patient for seizure activity, document and report duration and description of seizure to physician/advanced practitioner  - If seizure occurs,  ensure patient safety during seizure  - Reorient patient post seizure  - Seizure pads on all 4 side rails  - Instruct patient/family to notify RN of any seizure activity including if an aura is experienced  - Instruct patient/family to call for assistance with activity based on nursing assessment  - Administer anti-seizure medications if ordered    Outcome: Adequate for Discharge  Goal: Achieves maximal functionality and self care  Description: INTERVENTIONS  - Monitor swallowing and airway patency with patient fatigue and changes in neurological status  - Encourage and assist patient to increase activity and self care     - Encourage visually impaired, hearing impaired and aphasic patients to use assistive/communication devices  Outcome: Adequate for Discharge     Problem: CARDIOVASCULAR - ADULT  Goal: Maintains optimal cardiac output and hemodynamic stability  Description: INTERVENTIONS:  - Monitor I/O, vital signs and rhythm  - Monitor for S/S and trends of decreased cardiac output  - Administer and titrate ordered vasoactive medications to optimize hemodynamic stability  - Assess quality of pulses, skin color and temperature  - Assess for signs of decreased coronary artery perfusion  - Instruct patient to report change in severity of symptoms  Outcome: Adequate for Discharge  Goal: Absence of cardiac dysrhythmias or at baseline rhythm  Description: INTERVENTIONS:  - Continuous cardiac monitoring, vital signs, obtain 12 lead EKG if ordered  - Administer antiarrhythmic and heart rate control medications as ordered  - Monitor electrolytes and administer replacement therapy as ordered  Outcome: Adequate for Discharge     Problem: RESPIRATORY - ADULT  Goal: Achieves optimal ventilation and oxygenation  Description: INTERVENTIONS:  - Assess for changes in respiratory status  - Assess for changes in mentation and behavior  - Position to facilitate oxygenation and minimize respiratory effort  - Oxygen administered by appropriate delivery if ordered  - Initiate smoking cessation education as indicated  - Encourage broncho-pulmonary hygiene including cough, deep breathe, Incentive Spirometry  - Assess the need for suctioning and aspirate as needed  - Assess and instruct to report SOB or any respiratory difficulty  - Respiratory Therapy support as indicated  Outcome: Adequate for Discharge     Problem: GASTROINTESTINAL - ADULT  Goal: Minimal or absence of nausea and/or vomiting  Description: INTERVENTIONS:  - Administer IV fluids if ordered to ensure adequate hydration  - Maintain NPO status until nausea and vomiting are resolved  - Nasogastric tube if ordered  - Administer ordered antiemetic medications as needed  - Provide nonpharmacologic comfort measures as appropriate  - Advance diet as tolerated, if ordered  - Consider nutrition services referral to assist patient with adequate nutrition and appropriate food choices  Outcome: Adequate for Discharge  Goal: Maintains or returns to baseline bowel function  Description: INTERVENTIONS:  - Assess bowel function  - Encourage oral fluids to ensure adequate hydration  - Administer IV fluids if ordered to ensure adequate hydration  - Administer ordered medications as needed  - Encourage mobilization and activity  - Consider nutritional services referral to assist patient with adequate nutrition and appropriate food choices  Outcome: Adequate for Discharge  Goal: Maintains adequate nutritional intake  Description: INTERVENTIONS:  - Monitor percentage of each meal consumed  - Identify factors contributing to decreased intake, treat as appropriate  - Assist with meals as needed  - Monitor I&O, weight, and lab values if indicated  - Obtain nutrition services referral as needed  Outcome: Adequate for Discharge  Goal: Establish and maintain optimal ostomy function  Description: INTERVENTIONS:  - Assess bowel function  - Encourage oral fluids to ensure adequate hydration  - Administer IV fluids if ordered to ensure adequate hydration   - Administer ordered medications as needed  - Encourage mobilization and activity  - Nutrition services referral to assist patient with appropriate food choices  - Assess stoma site  - Consider wound care consult   Outcome: Adequate for Discharge     Problem: GENITOURINARY - ADULT  Goal: Maintains or returns to baseline urinary function  Description: INTERVENTIONS:  - Assess urinary function  - Encourage oral fluids to ensure adequate hydration if ordered  - Administer IV fluids as ordered to ensure adequate hydration  - Administer ordered medications as needed  - Offer frequent toileting  - Follow urinary retention protocol if ordered  Outcome: Adequate for Discharge  Goal: Absence of urinary retention  Description: INTERVENTIONS:  - Assess patients ability to void and empty bladder  - Monitor I/O  - Bladder scan as needed  - Discuss with physician/AP medications to alleviate retention as needed  - Discuss catheterization for long term situations as appropriate  Outcome: Adequate for Discharge  Goal: Urinary catheter remains patent  Description: INTERVENTIONS:  - Assess patency of urinary catheter  - If patient has a chronic garcia, consider changing catheter if non-functioning  - Follow guidelines for intermittent irrigation of non-functioning urinary catheter  Outcome: Adequate for Discharge     Problem: METABOLIC, FLUID AND ELECTROLYTES - ADULT  Goal: Electrolytes maintained within normal limits  Description: INTERVENTIONS:  - Monitor labs and assess patient for signs and symptoms of electrolyte imbalances  - Administer electrolyte replacement as ordered  - Monitor response to electrolyte replacements, including repeat lab results as appropriate  - Instruct patient on fluid and nutrition as appropriate  Outcome: Adequate for Discharge  Goal: Fluid balance maintained  Description: INTERVENTIONS:  - Monitor labs   - Monitor I/O and WT  - Instruct patient on fluid and nutrition as appropriate  - Assess for signs & symptoms of volume excess or deficit  Outcome: Adequate for Discharge  Goal: Glucose maintained within target range  Description: INTERVENTIONS:  - Monitor Blood Glucose as ordered  - Assess for signs and symptoms of hyperglycemia and hypoglycemia  - Administer ordered medications to maintain glucose within target range  - Assess nutritional intake and initiate nutrition service referral as needed  Outcome: Adequate for Discharge     Problem: SKIN/TISSUE INTEGRITY - ADULT  Goal: Skin integrity remains intact  Description: INTERVENTIONS  - Identify patients at risk for skin breakdown  - Assess and monitor skin integrity  - Assess and monitor nutrition and hydration status  - Monitor labs (i e  albumin)  - Assess for incontinence   - Turn and reposition patient  - Assist with mobility/ambulation  - Relieve pressure over bony prominences  - Avoid friction and shearing  - Provide appropriate hygiene as needed including keeping skin clean and dry  - Evaluate need for skin moisturizer/barrier cream  - Collaborate with interdisciplinary team (i e  Nutrition, Rehabilitation, etc )   - Patient/family teaching  Outcome: Adequate for Discharge  Goal: Incision(s), wounds(s) or drain site(s) healing without S/S of infection  Description: INTERVENTIONS  - Assess and document risk factors for skin impairment   - Assess and document dressing, incision, wound bed, drain sites and surrounding tissue  - Consider nutrition services referral as needed  - Oral mucous membranes remain intact  - Provide patient/ family education  Outcome: Adequate for Discharge  Goal: Oral mucous membranes remain intact  Description: INTERVENTIONS  - Assess oral mucosa and hygiene practices  - Implement preventative oral hygiene regimen  - Implement oral medicated treatments as ordered  - Initiate Nutrition services referral as needed  Outcome: Adequate for Discharge     Problem: HEMATOLOGIC - ADULT  Goal: Maintains hematologic stability  Description: INTERVENTIONS  - Assess for signs and symptoms of bleeding or hemorrhage  - Monitor labs  - Administer supportive blood products/factors as ordered and appropriate  Outcome: Adequate for Discharge     Problem: MUSCULOSKELETAL - ADULT  Goal: Maintain or return mobility to safest level of function  Description: INTERVENTIONS:  - Assess patient's ability to carry out ADLs; assess patient's baseline for ADL function and identify physical deficits which impact ability to perform ADLs (bathing, care of mouth/teeth, toileting, grooming, dressing, etc )  - Assess/evaluate cause of self-care deficits   - Assess range of motion  - Assess patient's mobility  - Assess patient's need for assistive devices and provide as appropriate  - Encourage maximum independence but intervene and supervise when necessary  - Involve family in performance of ADLs  - Assess for home care needs following discharge   - Consider OT consult to assist with ADL evaluation and planning for discharge  - Provide patient education as appropriate  Outcome: Adequate for Discharge  Goal: Maintain proper alignment of affected body part  Description: INTERVENTIONS:  - Support, maintain and protect limb and body alignment  - Provide patient/ family with appropriate education  Outcome: Adequate for Discharge

## 2021-01-31 NOTE — NURSING NOTE
Patient was evaluated by the hospitalist, who deemed the patient okay to be discharged home today  The patients central line was removed by nursing, with catheter tip intact  DSD and pressure applied, and a Tegaderm was also applied  The AVS was reviewed with the patient, who had no questions

## 2021-01-31 NOTE — ASSESSMENT & PLAN NOTE
· Patient completed course of remdesivir  · Patient was not started on dexamethasone or plasma therapy given that he is not requiring oxygen at this time  · Antibiotics discontinued as procalcitonin is normal  · Continue supportive care  · Outpatient follow up with PCP

## 2021-01-31 NOTE — ASSESSMENT & PLAN NOTE
· Continue home regimen  · Advised to discontinue Levemir at home for now  · Further management per PCP

## 2021-01-31 NOTE — DISCHARGE SUMMARY
Discharge- Ivy Robles 1940, 79 y o  male MRN: [id number]    Unit/Bed#: -01 Encounter: [id number]    Primary Care Provider: RIKA Vazquez   Date and time admitted to hospital: 1/24/2021  4:30 PM        * Pneumonia due to COVID-19 virus  Assessment & Plan  · Patient completed course of remdesivir  · Patient was not started on dexamethasone or plasma therapy given that he is not requiring oxygen at this time  · Antibiotics discontinued as procalcitonin is normal  · Continue supportive care  · Outpatient follow up with PCP    Acute renal failure superimposed on stage 5 chronic kidney disease, not on chronic dialysis [facility])  Assessment & Plan  · Likely exacerbated by COVID infection  Baseline creatinine appears to be around 4-5  · Creatinine slightly improved today  · Will continue gentle hydration as needed  · Lasix held for now  · Nephrology input appreciated  · Avoid any nephrotoxic meds  · Follow-up with PCP and Nephrology as outpatient for routine lab monitoring    Vasovagal episode  Assessment & Plan  · Patient had an episode of vasovagal syncope on 01/28/2021    Patient was bearing down while having a bowel movement on the bedside commode when he became dizzy/lightheaded and then briefly with decreased level of responsiveness  · Troponin was negative  · Patient was monitored in the ICU overnight and remained stable  · Appears to be back to baseline at this time  · Will continue IV fluids as needed    Anemia due to chronic kidney disease  Assessment & Plan  · No signs of acute bleeding at this time  · Outpatient lab monitoring with PCP    Urinary retention  Assessment & Plan  · Chronic Haile in place    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 [facility])  Assessment & Plan  · 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  · Follows with Dr Nicolasa Gay, on monthly injectable    Type 2 diabetes mellitus with hyperglycemia, with long-term current use of insulin ([facility] [address] )  Assessment & Plan  · Continue home regimen  · Advised to discontinue Levemir at home for now  · Further management per PCP      Discharging Physician / Practitioner: Cristiana Gibson MD  PCP: Ministerio Pérez  Admission Date:   Admission Orders (From admission, onward)     Ordered        01/24/21 1854  Inpatient Admission  Once                   Discharge Date: 01/31/21    Resolved Problems  Date Reviewed: 1/24/2021    None        Please see previous discharge summary for details regarding discharge plan    Hospital Course:     Chayito Shirley is a 79 y o  male patient who originally presented to the hospital on 1/24/2021 due to weakness  Please see previous discharge summary for details regarding hospital course  Patient was initially scheduled to be discharged on 01/28/2021  Due to syncopal episode patient remained in the hospital   Patient with intermittent diarrhea  Discussed with patient's wife/significant other over the phone who stated that he always has diarrhea  At this time patient has no leukocytosis, fever, or abdominal pain  No suspicion of C diff  Patient is significantly depressed  Explained all these findings to patient's significant other Vinh Ramos over the phone who stated that she will take him home  ET was recommending rehab however family does not want patient to go to rehab and would like patient to go home  Case management has arranged for home care  I explained to limit that it was not a safe plan to discharge patient home however she states that she has been taking care of him since his cancer diagnosis 2 years ago and will be able to handle him at home and does have health with home health care and family near by  Please see above list of diagnoses and related plan for additional information       Condition at Discharge: poor     Discharge Day Visit / Exam:     Subjective:  Patient complaining of pain of buttock and perineal area    Vitals: Blood Pressure: 136/72 (01/30/21 2331)  Pulse: 81 (01/30/21 2331)  Temperature: 97 9 °F (36 6 °C) (01/30/21 2331)  Temp Source: Temporal (01/30/21 2331)  Respirations: 18 (01/30/21 2331)  Height: 5' 8" (172 7 cm) (01/24/21 2051)  Weight - Scale: 70 1 kg (154 lb 8 7 oz) (01/24/21 2051)  SpO2: 99 % (01/30/21 2331)     Exam:   Physical Exam  Constitutional:       General: He is not in acute distress  Comments: Frail elderly male   HENT:      Head: Normocephalic and atraumatic  Nose: Nose normal       Mouth/Throat:      Mouth: Mucous membranes are dry  Eyes:      Extraocular Movements: Extraocular movements intact  Conjunctiva/sclera: Conjunctivae normal    Neck:      Musculoskeletal: Normal range of motion and neck supple  Cardiovascular:      Rate and Rhythm: Normal rate and regular rhythm  Pulmonary:      Effort: Pulmonary effort is normal  No respiratory distress  Abdominal:      Palpations: Abdomen is soft  Tenderness: There is no abdominal tenderness  Genitourinary:     Comments: Haile catheter in place  Musculoskeletal:      Comments: Generalized weakness   Skin:     General: Skin is warm and dry  Coloration: Skin is pale  Neurological:      Comments: Patient is awake and alert  Follows simple commands   Psychiatric:         Mood and Affect: Mood is depressed  Discussion with Family:  Spoke with patient's significant other went over the phone    Discharge instructions/Information to patient and family:   See after visit summary for information provided to patient and family  Provisions for Follow-Up Care:  See after visit summary for information related to follow-up care and any pertinent home health orders  Disposition:     Home with [facility] (Reminder: Complete face to face encounter)      Planned Readmission:    no     Discharge Statement:  I spent 35 minutes discharging the patient  This time was spent on the day of discharge  I had direct contact with the patient on the day of discharge   Greater than 50% of the total time was spent examining patient, answering all patient questions, arranging and discussing plan of care with patient as well as directly providing post-discharge instructions  Additional time then spent on discharge activities  Discharge Medications:  See after visit summary for reconciled discharge medications provided to patient and family        ** Please Note: This note has been constructed using a voice recognition system **

## 2021-01-31 NOTE — ASSESSMENT & PLAN NOTE
· Likely exacerbated by COVID infection    Baseline creatinine appears to be around 4-5  · Creatinine slightly improved today  · Will continue gentle hydration as needed  · Lasix held for now  · Nephrology input appreciated  · Avoid any nephrotoxic meds  · Follow-up with PCP and Nephrology as outpatient for routine lab monitoring

## 2021-01-31 NOTE — PROGRESS NOTES
NEPHROLOGY PROGRESS NOTE   Champ Tatum 79 y o  male MRN: [id number]  Unit/Bed#: -01 Encounter: [id number]    Assessment/Plan:     In summary, this is an 69-year-old man well known to this service with pertinent medical problems significant for CKD 5 medically managed without dialysis, metastatic neuroendocrine carcinoma, chronic indwelling urinary catheter admitted 1/24 and being treated for COVID-19   Renal following for acute kidney injury which is stable  He is tentative for discharge today       1  Acute kidney injury (POA) atop chronic kidney disease  ? Renal function remains stable  He is tentative for discharge today which is okay per renal standpoint  Still does not want dialysis which is very reasonable given his multiple comorbidities  ? He will have blood work done and be seen in office for follow-up appt   2  Stage 5 chronic kidney disease, medically managed without hemodialysis, with baseline creatinine around 4 5 mg/dL  3  Acidosis  ? Continue low dose oral bicarbonate therapy and will re-evaluate as an outpatient  4  Urinary retention  ? Chronic - continue indwelling urinary catheter   5  Encephalopathy  ? Improved today  He was mildly hypernatremic and this was treated with hypotonic fluids  6  Hypertensive nephrosclerosis  ? acceptable  7  Metastatic neuroendocrine carcinoma  · noted    ROS  No physical complaints  Wants to go home  A complete 10 point review of systems have been performed and are otherwise negative         Historical Information   Past Medical History:   Diagnosis Date    Acute pancreatitis without infection or necrosis 9/26/2019    Age-related nuclear cataract, right 11/5/2020    Anemia of chronic renal failure     BPH (benign prostatic hyperplasia)     CKD (chronic kidney disease) stage 5, GFR less than 15 ml/min (HCC)     Coronary artery disease     DJD (degenerative joint disease)     Essential hypertension     Gait disturbance     Generalized weakness     GERD (gastroesophageal reflux disease)     Gout     History of transfusion     Hydronephrosis     Hyperlipidemia     Hypertension     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)     Pressure injury of skin     Proteinuria     Thrombophlebitis migrans     Type 2 diabetes mellitus      Past Surgical History:   Procedure Laterality Date    CATARACT EXTRACTION Right 11/05/2020    CATARACT EXTRACTION W/ INTRAOCULAR LENS IMPLANT Right 11/5/2020    Procedure: EYE OD CATARACT EXTRACTION WITH IOL INSERTION;  Surgeon: Manuel Enriquez MD;  Location: [facility] MAIN OR;  Service: Ophthalmology    CHOLECYSTECTOMY     [address] [facility] N/A 2/7/2020    Procedure: CHOLECYSTECTOMY LAPAROSCOPIC;  Surgeon:  Fina Hopkins MD;  Location: [facility] MAIN OR;  Service: General    CORONARY ANGIOPLASTY WITH STENT PLACEMENT      FL RETROGRADE PYELOGRAM  5/21/2020    IR BIOPSY LIVER MASS  1/24/2019    IR CHOLECYSTOSTOMY TUBE PLACEMENT  9/6/2019    IR NON-TUNNELED CENTRAL LINE PLACEMENT  1/28/2021    HI CYSTO/URETERO W/LITHOTRIPSY &INDWELL STENT INSRT Left 5/21/2020    Procedure: CYSTOSCOPY URETEROSCOPY WITH LITHOTRIPSY HOLMIUM LASER, RETROGRADE PYELOGRAM AND INSERTION STENT URETERAL;  Surgeon: Erendira Hernandez MD;  [facility];  Service: Urology    HI CYSTOURETHROSCOPY,URETER CATHETER Left 4/21/2020    Procedure: CYSTOSCOPY WITH INSERTION STENT URETERAL;  Surgeon: Chad Grant MD;  [facility];  Service: Urology     Social History   Social History     Substance and Sexual Activity   Alcohol Use Never    Alcohol/week: 0 0 standard drinks    Frequency: Never     Social History     Substance and Sexual Activity   Drug Use Never     Social History     Tobacco Use   Smoking Status Never Smoker   Smokeless Tobacco Never Used       Family History:   Family History   Problem Relation Age of Onset    Cancer Mother     Hypertension Father     Cancer Brother     Cancer Maternal Grandmother     Cancer Paternal Aunt        Medications:  Pertinent medications were reviewed  Current Facility-Administered Medications   Medication Dose Route Frequency Provider Last Rate    acetaminophen  650 mg Oral Q6H PRN Yumiko Pitt MD      aspirin  81 mg Oral Daily Yumiko Pitt MD      b complex-vitamin C-folic acid  1 capsule Oral Daily With Jace Mcadams MD      cholecalciferol  2,000 Units Oral Daily Yumiko Pitt MD      colchicine  0 3 mg Oral Daily Yumiko Pitt MD      vitamin B-12  100 mcg Oral Daily Yumiko Pitt MD      famotidine  20 mg Intravenous Q24H Roxanna Oor MD      heparin (porcine)  5,000 Units Subcutaneous [facility] Roxanna Oro MD      hydrALAZINE  5 mg Intravenous Q8H PRN Yumiko Pitt MD      HYDROmorphone  0 2 mg Intravenous Q4H PRN Yumiko Pitt MD      insulin lispro  1-6 Units Subcutaneous 4x Daily (AC & HS) Yumiko Pitt MD      magnesium oxide  200 mg Oral BID MD Tarsha Carbajal Smiley Leach ON 2/1/2021] multivitamin-minerals  1 tablet Oral Daily Yumiko Pitt MD      NIFEdipine  60 mg Oral Daily Yumiko Pitt MD      ondansetron  4 mg Intravenous Q4H PRN Yumiko Pitt MD      sodium bicarbonate  325 mg Oral BID Yumiko Pitt MD      tamsulosin  0 4 mg Oral Daily With Jace Mcadams MD           No Known Allergies      Vitals:   /72 (BP Location: Left arm)   Pulse 81   Temp 97 9 °F (36 6 °C) (Temporal)   Resp 18   Ht 5' 8" (1 727 m)   Wt 70 1 kg (154 lb 8 7 oz)   SpO2 99%   BMI 23 50 kg/m²   Body mass index is 23 5 kg/m²    SpO2: 99 %,   SpO2 Activity: At Rest,   O2 Device: None (Room air)      Intake/Output Summary (Last 24 hours) at 1/31/2021 1247  Last data filed at 1/30/2021 2112  Gross per 24 hour   Intake 120 ml   Output 300 ml   Net -180 ml     Invasive Devices     Central Venous Catheter Line            CVC Central Lines 01/28/21 Triple Right Internal jugular 2 days          Drain            Urethral Catheter -- days Physical Exam  General: conscious, cooperative, in no acute distress, chronically ill appearing   Eyes: conjunctivae pink, anicteric sclerae  ENT: lips and mucous membranes moist  Neck: supple, no JVD, no masses  Chest: clear breath sounds bilateral, no crackles, ronchus or wheezings  CVS: S1 & S2, normal rate, regular rhythm  Abdomen: soft, non-tender, non-distended, normoactive bowel sounds  Extremities: no edema of both legs  Skin: no rash  Neuro: awake, alert, oriented      Diagnostic Data:  Lab: I have personally reviewed pertinent lab results  ,   CBC:  Results from last 7 days   Lab Units 01/31/21  0618   WBC Thousand/uL 5 90   HEMOGLOBIN g/dL 8 0*   HEMATOCRIT % 23 9*   PLATELETS Thousands/uL 208      CMP:   Lab Results   Component Value Date    SODIUM 142 01/31/2021    K 3 7 01/31/2021     (H) 01/31/2021    CO2 19 (L) 01/31/2021    BUN 49 (H) 01/31/2021    CREATININE 4 98 (H) 01/31/2021    CALCIUM 7 9 (L) 01/31/2021    EGFR 10 01/31/2021   ,   PT/INR: No results found for: PT, INR,   Magnesium: No components found for: MAG,  Phosphorous: No results found for: PHOS    Microbiology:  @[facility],(urinecx:7)@        RIKA Sweet    Portions of the record may have been created with voice recognition software  Occasional wrong word or "sound a like" substitutions may have occurred due to the inherent limitations of voice recognition software  Read the chart carefully and recognize, using context, where substitutions have occurred

## 2021-02-03 NOTE — PROGRESS NOTES
Assessment/Plan:        Problem List Items Addressed This Visit     None             Reason for visit is ***    Encounter provider RIKA Dawkins       Provider located at [address]  [address] [facility]      Recent Visits  No visits were found meeting these conditions  Showing recent visits within past 7 days and meeting all other requirements     Today's Visits  Date Type Provider Dept   02/03/21 Telephone Adrienne Thakkar MA [facility]   Showing today's visits and meeting all other requirements     Future Appointments  No visits were found meeting these conditions  Showing future appointments within next 150 days and meeting all other requirements        After connecting through [employer], the patient was identified by name and date of birth  Manav Fishman was informed that this is a telemedicine visit and that the visit is being conducted through {AMB CORONAVIRUS VISIT SCFXOK:79278}  {Telemedicine confidentiality :86902} {Telemedicine participants:64947}  He acknowledged consent and understanding of privacy and security of the video platform  The patient has agreed to participate and understands they can discontinue the visit at any time  Patient is aware this is a billable service  Subjective:     Patient ID: Manav Fishman is a 79 y o  male  HPI    Review of Systems      Objective: There were no vitals filed for this visit  Physical Exam        Transitional Care Management Review:  Manav Fishman is a 79 y o  male here for TCM follow up       During the TCM phone call patient stated:    TCM Call (since 1/3/2021)     Patient was hospitialized at  [address]        Date of Admission  01/24/21    Date of discharge  01/28/21    Diagnosis  Pneumonia due to COVID-19 virus    Disposition  Home    Were the patients medications reviewed and updated  Yes      TCM Call (since 1/3/2021) Post hospital issues  Reduced activity; Poor ADL (Activities of Daily Living) performance    Should patient be enrolled in anticoag monitoring? No    Scheduled for follow up? Yes    Did you obtain your prescribed medications  Yes    Do you need help managing your prescriptions or medications  No    Is transportation to your appointment needed  Yes    Specify why  ambulating     I have advised the patient to call PCP with any new or worsening symptoms  [address] or [address] other    Support System  Family; Spouse    The type of support provided  Emotional; Physical    Do you have social support  Yes, as much as I need    Are you recieving any outpatient services  Yes    What type of services  home health     Are you recieving home care services  Yes    Types of home care services  Home health aid    Are you using any community resources  No    Current waiver services  No    Have you fallen in the last 12 months  Yes    Interperter language line needed  No    Counseling  Patient; Family    Counseling topics  Diagnostic results          I spent *** minutes with the patient during this visit      Christina Gilliland

## 2021-02-03 NOTE — PROGRESS NOTES
Virtual Brief Visit    Assessment/Plan:    Problem List Items Addressed This Visit     Syncope and collapse (Chronic)    Neuroendocrine tumor (Chronic)    Type 2 diabetes mellitus with hyperglycemia, with long-term current use of insulin (HCC)    Urinary retention    CKD (chronic kidney disease) stage 5, GFR less than 15 ml/min ([facility])    Vasovagal episode    Acute renal failure superimposed on stage 5 chronic kidney disease, not on chronic dialysis ([facility] [address] )    Pneumonia due to COVID-19 virus      Other Visit Diagnoses     Encounter for support and coordination of transition of care    -  Primary            Falls Plan of Care: balance, strength, and gait training instructions were provided  Medications that increase falls were reviewed  PT/OT, RN/CNA to come 1-2 times a week  Reason for visit is   Chief Complaint   Patient presents with    Virtual Tcm    Virtual Brief Visit        Encounter provider RIKA Maldonado    Provider located at [address] [place] [address] [address] [place] [address]    Recent Visits  No visits were found meeting these conditions  Showing recent visits within past 7 days and meeting all other requirements     Today's Visits  Date Type Provider Dept   02/03/21 Office Visit RIKA Maldonado [facility] [address] [place] [address] Primary Care   02/03/21 Telephone Cleave VALERIA Dominguez [facility]   Showing today's visits and meeting all other requirements     Future Appointments  No visits were found meeting these conditions  Showing future appointments within next 150 days and meeting all other requirements        After connecting through telephone as he does hot have access to computer or smart device, the patient was identified by name and date of birth  Antonjaylon Das was informed that this is a telemedicine visit and that the visit is being conducted through telephone  My office door was closed  No one else was in the room  He acknowledged consent and understanding of privacy and security of the platform  The patient has agreed to participate and understands he can discontinue the visit at any time  Patient is aware this is a billable service  Subjective    Anderson Cevallos is a 79 y o  male   Anderson Cevallos present for TCM visit s/p hospitalization for COVID-19 pneumonia  Wife concerned not able to get driveway [facility] RN/CNA, PT/OT to visit house 1-2 times per week for care/therapy  Pt wife is assiting Pt with needs  She is attemtping to get Pt to sit up in bed, roll side to side, and deep breathing exercises  Continue outpatient therapy which includes over-the-counter medications specific to symptomatology, vitamin C 500 mg b i d , zinc  mg daily, and vitamin D 1000 IU daily, hydration, and continue with isolation recommendations; staying ins sick room, avoid contact with other in house, wipe commonly touched surfaces            Past Medical History:   Diagnosis Date    Acute pancreatitis without infection or necrosis 9/26/2019    Age-related nuclear cataract, right 11/5/2020    Anemia of chronic renal failure     BPH (benign prostatic hyperplasia)     CKD (chronic kidney disease) stage 5, GFR less than 15 ml/min (HCC)     Coronary artery disease     COVID-19     DJD (degenerative joint disease)     Essential hypertension     Gait disturbance     Generalized weakness     GERD (gastroesophageal reflux disease)     Gout     History of transfusion     Hydronephrosis     Hyperlipidemia     Hypertension     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)     Pressure injury of skin     Proteinuria     Thrombophlebitis migrans     Type 2 diabetes mellitus        Past Surgical History:   Procedure Laterality Date    CATARACT EXTRACTION Right 11/05/2020    CATARACT EXTRACTION W/ INTRAOCULAR LENS IMPLANT Right 11/5/2020    Procedure: EYE OD CATARACT EXTRACTION WITH IOL INSERTION; Surgeon: Nilsa Manley MD;  Location: [facility] MAIN OR;  Service: Ophthalmology    CHOLECYSTECTOMY     [address] [facility] N/A 2/7/2020    Procedure: [address];  Surgeon:  Willy Bose MD;  Location: [facility] MAIN OR;  Service: General    CORONARY ANGIOPLASTY WITH STENT PLACEMENT      FL RETROGRADE PYELOGRAM  5/21/2020    IR BIOPSY LIVER MASS  1/24/2019    IR CHOLECYSTOSTOMY TUBE PLACEMENT  9/6/2019    IR NON-TUNNELED CENTRAL LINE PLACEMENT  1/28/2021    DE CYSTO/URETERO W/LITHOTRIPSY &INDWELL STENT INSRT Left 5/21/2020    Procedure: CYSTOSCOPY URETEROSCOPY WITH LITHOTRIPSY HOLMIUM LASER, RETROGRADE PYELOGRAM AND INSERTION STENT URETERAL;  Surgeon: Divine Howell MD;  [facility];  Service: Urology    DE CYSTOURETHROSCOPY,URETER CATHETER Left 4/21/2020    Procedure: CYSTOSCOPY WITH INSERTION STENT URETERAL;  Surgeon: Vincent Carr MD;  [facility];  Service: Urology       Current Outpatient Medications   Medication Sig Dispense Refill    acetaminophen (TYLENOL) 325 mg tablet Take 650 mg by mouth every 6 (six) hours as needed       ascorbic acid (VITAMIN C) 1000 MG tablet Take 1 tablet (1,000 mg total) by mouth every 12 (twelve) hours for 6 doses 6 tablet 0    aspirin 81 MG tablet Take 81 mg by mouth daily      B Complex-C-Folic Acid (TRIPHROCAPS) 1 MG CAPS Take 1 capsule (1 mg total) by mouth daily with dinner 30 each 5    cholecalciferol (VITAMIN D3) 1,000 units tablet Take 1,000 Units by mouth daily Take 2      colchicine (COLCRYS) 0 6 mg tablet Take 1 tablet (0 6 mg total) by mouth daily (Patient taking differently: Take 0 3 mg by mouth daily )      famotidine (PEPCID) 10 mg tablet Take 1 tablet (10 mg total) by mouth daily at bedtime 30 tablet 0    magnesium oxide (MAG-OX) 400 mg Take 0 5 tablets (200 mg total) by mouth 2 (two) times a day      NIFEdipine ER (ADALAT CC) 60 MG 24 hr tablet Take 1 tablet (60 mg total) by mouth daily 30 tablet 3    pantoprazole (PROTONIX) 40 mg tablet Take 1 tablet (40 mg total) by mouth daily 90 tablet 1    sodium bicarbonate 650 mg tablet Take 325 mg by mouth 2 (two) times a day       sucralfate (CARAFATE) 1 g/10 mL suspension Take 10 mL (1,000 mg total) by mouth every 6 (six) hours 420 mL 0    tamsulosin (FLOMAX) 0 4 mg Take 1 capsule (0 4 mg total) by mouth daily with dinner 90 capsule 3    vitamin B-12 (VITAMIN B-12) 1,000 mcg tablet Take 100 mcg by mouth daily       zinc sulfate (ZINCATE) 220 mg capsule Take 1 capsule (220 mg total) by mouth daily for 3 doses 3 capsule 0     No current facility-administered medications for this visit  No Known Allergies    Review of Systems   Genitourinary:        Chronic urinary catheter   Musculoskeletal: Positive for gait problem  Roller walker use   All other systems reviewed and are negative  Vitals:    02/03/21 1337   Pulse: 76   Temp: 98 6 °F (37 °C)   SpO2: 94%   Weight: 69 9 kg (154 lb)   Height: 5' 8" (1 727 m)       TCM Call (since 1/3/2021)     Patient was hospitialized at  [address]        Date of Admission  01/24/21    Date of discharge  01/28/21    Diagnosis  Pneumonia due to COVID-19 virus    Disposition  Home    Were the patients medications reviewed and updated  Yes      TCM Call (since 1/3/2021)     Post hospital issues  Reduced activity; Poor ADL (Activities of Daily Living) performance    Should patient be enrolled in anticoag monitoring? No    Scheduled for follow up? Yes    Did you obtain your prescribed medications  Yes    Do you need help managing your prescriptions or medications  No    Is transportation to your appointment needed  Yes    Specify why  ambulating     I have advised the patient to call PCP with any new or worsening symptoms  [address] or [address] other    Support System  Family;  Spouse    The type of support provided  Emotional; Physical    Do you have social support Yes, as much as I need    Are you recieving any outpatient services  Yes    What type of services  home health     Are you recieving home care services  Yes    Types of home care services  Home health aid    Are you using any community resources  No    Current waiver services  No    Have you fallen in the last 12 months  Yes    Interperter language line needed  No    Counseling  Patient; Family    Counseling topics  Diagnostic results            I spent 10 minutes directly with the patient during this visit    VIRTUAL VISIT DISCLAIMER    Junior Canas acknowledges that he has consented to an online visit or consultation  He understands that the online visit is based solely on information provided by him, and that, in the absence of a face-to-face physical evaluation by the physician, the diagnosis he receives is both limited and provisional in terms of accuracy and completeness  This is not intended to replace a full medical face-to-face evaluation by the physician  Junior Canas understands and accepts these terms

## 2021-02-09 PROBLEM — I48.91 ATRIAL FIBRILLATION (HCC): Status: ACTIVE | Noted: 2021-01-01

## 2021-02-09 PROBLEM — Z86.19 HISTORY OF CLOSTRIDIOIDES DIFFICILE INFECTION: Status: ACTIVE | Noted: 2021-01-01

## 2021-02-09 PROBLEM — N18.9 ACUTE KIDNEY INJURY SUPERIMPOSED ON CKD (HCC): Status: ACTIVE | Noted: 2021-01-01

## 2021-02-09 NOTE — ED PROVIDER NOTES
History  Chief Complaint   Patient presents with    Syncope     reports d/c on 2/1 from [facility] for covid  reports d/c home with [facility]NA/PT/OT referrals  per ems, [facility] just visisted pt for first time today and tried to get pt OOB to w/c for the first time since d/c home  reports pt had syncopal episode into w/c, recovered shortly after  denies head strike   Weakness - Generalized     pt with generalized weakness, difficulty lifting extremeties  59-year-old male presenting to the emergency department for evaluation of  Generalized fatigue  Patient was discharged on the 1st from [facility] for [facility]  At the time it was recommended  That he go to inpatient rehab, but it was established instead per the patient's family's  Preference to arrange for him to go home home health  Unfortunately due to several circumstances home health was not able to be arranged until a week after his discharge home, health aide found the patient to be generally fatigued, get out of bed  Patient's wife called 911 again because she is very concerned about his state of health, get also pale lost about 6 lb, with a tried to move him to a chair get a syncopal episode this was the, he has had no chest pain or shortness of breath, patient states "I feel fine" would like to go home and lay in bed but is unable to get out of bed on his own power  He has had no further fevers, no shortness of breath or cough  Prior to Admission Medications   Prescriptions Last Dose Informant Patient Reported? Taking?    B Complex-C-Folic Acid (triphrocaps) 1 MG CAPS   No No   Sig: Take 1 capsule (1 mg total) by mouth daily with dinner   NIFEdipine ER (ADALAT CC) 60 MG 24 hr tablet  Spouse/Significant Other No No   Sig: Take 1 tablet (60 mg total) by mouth daily   acetaminophen (TYLENOL) 325 mg tablet  Spouse/Significant Other Yes No   Sig: Take 650 mg by mouth every 6 (six) hours as needed    ascorbic acid (VITAMIN C) 1000 MG tablet   No No   Sig: Take 1 tablet (1,000 mg total) by mouth every 12 (twelve) hours for 6 doses   aspirin 81 MG tablet  Spouse/Significant Other Yes No   Sig: Take 81 mg by mouth daily   cholecalciferol (VITAMIN D3) 1,000 units tablet  Spouse/Significant Other Yes No   Sig: Take 1,000 Units by mouth daily Take 2   colchicine (COLCRYS) 0 6 mg tablet   No No   Sig: Take 1 tablet (0 6 mg total) by mouth daily   Patient taking differently: Take 0 3 mg by mouth daily    famotidine (PEPCID) 10 mg tablet  Spouse/Significant Other No No   Sig: Take 1 tablet (10 mg total) by mouth daily at bedtime   magnesium oxide (MAG-OX) 400 mg   No No   Sig: Take 0 5 tablets (200 mg total) by mouth 2 (two) times a day   pantoprazole (PROTONIX) 40 mg tablet  Spouse/Significant Other No No   Sig: Take 1 tablet (40 mg total) by mouth daily   sodium bicarbonate 650 mg tablet  Spouse/Significant Other Yes No   Sig: Take 325 mg by mouth 2 (two) times a day    sucralfate (CARAFATE) 1 g/10 mL suspension  Spouse/Significant Other No No   Sig: Take 10 mL (1,000 mg total) by mouth every 6 (six) hours   tamsulosin (FLOMAX) 0 4 mg   No No   Sig: Take 1 capsule (0 4 mg total) by mouth daily with dinner   vitamin B-12 (VITAMIN B-12) 1,000 mcg tablet  Spouse/Significant Other Yes No   Sig: Take 100 mcg by mouth daily    zinc sulfate (ZINCATE) 220 mg capsule   No No   Sig: Take 1 capsule (220 mg total) by mouth daily for 3 doses      Facility-Administered Medications: None       Past Medical History:   Diagnosis Date    Acute pancreatitis without infection or necrosis 9/26/2019    Age-related nuclear cataract, right 11/5/2020    Anemia of chronic renal failure     BPH (benign prostatic hyperplasia)     CKD (chronic kidney disease) stage 5, GFR less than 15 ml/min ([facility])     Coronary artery disease     COVID-19     DJD (degenerative joint disease)     Essential hypertension     Gait disturbance     Generalized weakness     GERD (gastroesophageal reflux disease)     Gout  History of transfusion     Hydronephrosis     Hyperlipidemia     Hypertension     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)     Pressure injury of skin     Proteinuria     Thrombophlebitis migrans     Type 2 diabetes mellitus        Past Surgical History:   Procedure Laterality Date    CATARACT EXTRACTION Right 11/05/2020    CATARACT EXTRACTION W/ INTRAOCULAR LENS IMPLANT Right 11/5/2020    Procedure: EYE OD CATARACT EXTRACTION WITH IOL INSERTION;  Surgeon: Angle Galdamez MD;  Location: [facility] MAIN OR;  Service: Ophthalmology    CHOLECYSTECTOMY     [address] N/A 2/7/2020    Procedure: CHOLECYSTECTOMY LAPAROSCOPIC;  Surgeon: Manuel Aleman MD;  Location: [facility] MAIN OR;  Service: General    CORONARY ANGIOPLASTY WITH STENT PLACEMENT      FL RETROGRADE PYELOGRAM  5/21/2020    IR BIOPSY LIVER MASS  1/24/2019    IR CHOLECYSTOSTOMY TUBE PLACEMENT  9/6/2019    IR NON-TUNNELED CENTRAL LINE PLACEMENT  1/28/2021    MO CYSTO/URETERO W/LITHOTRIPSY &INDWELL STENT INSRT Left 5/21/2020    Procedure: CYSTOSCOPY URETEROSCOPY WITH LITHOTRIPSY HOLMIUM LASER, RETROGRADE PYELOGRAM AND INSERTION STENT URETERAL;  Surgeon: Mikhail Adhikari MD;  Location: [facility] MAIN [facility];  Service: Urology    MO CYSTOURETHROSCOPY,URETER CATHETER Left 4/21/2020    Procedure: CYSTOSCOPY WITH INSERTION STENT URETERAL;  Surgeon: Crispin Rush MD;  [facility];  Service: Urology       Family History   Problem Relation Age of Onset    Cancer Mother     Hypertension Father     Cancer Brother     Cancer Maternal Grandmother     Cancer Paternal Aunt      I have reviewed and agree with the history as documented      E-Cigarette/Vaping    E-Cigarette Use Never User      E-Cigarette/Vaping Substances    Nicotine No     THC No     CBD No     Flavoring No     Other No     Unknown No      Social History     Tobacco Use    Smoking status: Never Smoker    Smokeless tobacco: Never Used   Substance Use Topics  Alcohol use: Never     Alcohol/week: 0 0 standard drinks     Frequency: Never    Drug use: Never       Review of Systems   Constitutional: Positive for fatigue  Negative for appetite change, chills and fever  HENT: Negative for sneezing and sore throat  Eyes: Negative for visual disturbance  Respiratory: Negative for cough, choking, chest tightness, shortness of breath and wheezing  Cardiovascular: Negative for chest pain and palpitations  Gastrointestinal: Negative for abdominal pain, constipation, diarrhea, nausea and vomiting  Genitourinary: Negative for difficulty urinating and dysuria  Neurological: Negative for dizziness, weakness, light-headedness, numbness and headaches  All other systems reviewed and are negative  Physical Exam  Physical Exam  Constitutional:       General: He is not in acute distress  Appearance: He is well-developed  He is ill-appearing  He is not diaphoretic  HENT:      Head: Normocephalic and atraumatic  Eyes:      Pupils: Pupils are equal, round, and reactive to light  Neck:      Vascular: No JVD  Trachea: No tracheal deviation  Cardiovascular:      Rate and Rhythm: Normal rate and regular rhythm  Heart sounds: Normal heart sounds  No murmur  No friction rub  No gallop  Pulmonary:      Effort: Pulmonary effort is normal  No respiratory distress  Breath sounds: Normal breath sounds  No wheezing or rales  Abdominal:      General: Bowel sounds are normal  There is no distension  Palpations: Abdomen is soft  Tenderness: There is no abdominal tenderness  There is no guarding or rebound  Skin:     General: Skin is warm and dry  Coloration: Skin is not pale  Neurological:      Mental Status: He is alert and oriented to person, place, and time  Cranial Nerves: No cranial nerve deficit  Motor: No abnormal muscle tone     Psychiatric:         Behavior: Behavior normal          Vital Signs  ED Triage Vitals Temperature Pulse Respirations Blood Pressure SpO2   02/09/21 1639 02/09/21 1629 02/09/21 1629 02/09/21 1629 02/09/21 1629   97 5 °F (36 4 °C) 88 18 119/63 99 %      Temp Source Heart Rate Source Patient Position - Orthostatic VS BP Location FiO2 (%)   02/09/21 1639 -- -- -- --   Oral          Pain Score       02/09/21 1629       6           Vitals:    02/09/21 1629   BP: 119/63   Pulse: 88         Visual Acuity  Visual Acuity      Most Recent Value   L Pupil Size (mm)  3   R Pupil Size (mm)  4 [irregular]          ED Medications  Medications   sodium chloride 0 9 % bolus 1,000 mL (1,000 mL Intravenous New Bag 2/9/21 1701)   ceftriaxone (ROCEPHIN) 1 g/50 mL in dextrose IVPB (1,000 mg Intravenous New Bag 2/9/21 1832)   sodium chloride 0 9 % bolus 1,000 mL (has no administration in time range)   acetaminophen (TYLENOL) tablet 975 mg (975 mg Oral Given 2/9/21 1832)       Diagnostic Studies  Results Reviewed     Procedure Component Value Units Date/Time    Lactic acid [[id number]]  (Abnormal) Collected: 02/09/21 1712    Lab Status: Final result Specimen: Blood from Arm, Right Updated: 02/09/21 1815     LACTIC ACID 3 9 mmol/L     Narrative:      Result may be elevated if tourniquet was used during collection  Lactic acid 2 Hours [[id number]]     Lab Status: No result Specimen: Blood     Urine Microscopic [[id number]]  (Abnormal) Collected: 02/09/21 1737    Lab Status: Final result Specimen: Urine, Indwelling Haile Catheter Updated: 02/09/21 1806     RBC, UA 0-1 /hpf      WBC, UA       Field obscured, unable to enumerate     /hpf     Epithelial Cells None Seen /hpf      Bacteria, UA Innumerable /hpf      MUCUS THREADS Innumerable    Urine culture [[id number]] Collected: 02/09/21 1737    Lab Status:  In process Specimen: Urine, Indwelling Haile Catheter Updated: 02/09/21 1805    UA w Reflex to Microscopic w Reflex to Culture [[id number]]  (Abnormal) Collected: 02/09/21 1737    Lab Status: Final result Specimen: Urine, Indwelling Haile Catheter Updated: 02/09/21 1805     Color, UA Yellow     Clarity, UA Cloudy     Specific [place], UA 1 010     pH, UA 8 0     Leukocytes, UA Large     Nitrite, UA Negative     Protein,  (3+) mg/dl      Glucose, UA Negative mg/dl      Ketones, UA Negative mg/dl      Urobilinogen, UA 0 2 E U /dl      Bilirubin, UA Negative     Blood, UA Trace    Procalcitonin with AM Reflex [[id number]] Updated: 02/09/21 1744    Lab Status: In process Specimen: Blood from Arm, Right     D-dimer, quantitative [[id number]]  (Abnormal) Collected: 02/09/21 1658    Lab Status: Final result Specimen: Blood from Arm, Right Updated: 02/09/21 1726     D-Dimer, Quant 2 71 ug/ml FEU     Blood culture #1 [[id number]] Collected: 02/09/21 1721    Lab Status: In process Specimen: Blood from Arm, Left Updated: 02/09/21 1723    Blood culture #2 [[id number]] Collected: 02/09/21 1713    Lab Status:  In process Specimen: Blood from Arm, Right Updated: 02/09/21 1723    Troponin I [[id number]]  (Normal) Collected: 02/09/21 1658    Lab Status: Final result Specimen: Blood from Arm, Right Updated: 02/09/21 1720     Troponin I <0 02 ng/mL     Comprehensive metabolic panel [[id number]]  (Abnormal) Collected: 02/09/21 1658    Lab Status: Final result Specimen: Blood from Arm, Right Updated: 02/09/21 1718     Sodium 136 mmol/L      Potassium 3 8 mmol/L      Chloride 102 mmol/L      CO2 21 mmol/L      ANION GAP 13 mmol/L      BUN 49 mg/dL      Creatinine 6 08 mg/dL      Glucose 294 mg/dL      Calcium 8 4 mg/dL      Corrected Calcium 9 5 mg/dL      AST 40 U/L      ALT 77 U/L      Alkaline Phosphatase 403 U/L      Total Protein 6 6 g/dL      Albumin 2 6 g/dL      Total Bilirubin 0 60 mg/dL      eGFR 8 ml/min/1 73sq m     Narrative:      [facility] (CKD):     Stage 1 with normal or high GFR (GFR > 90 mL/min/1 73 square meters)    Stage 2 Mild CKD (GFR = 60-89 mL/min/1 73 square meters)    Stage 3A Moderate CKD (GFR = 45-59 mL/min/1 73 square meters)    Stage 3B Moderate CKD (GFR = 30-44 mL/min/1 73 square meters)    Stage 4 Severe CKD (GFR = 15-29 mL/min/1 73 square meters)    Stage 5 End Stage CKD (GFR <15 mL/min/1 73 square meters)  Note: GFR calculation is accurate only with a steady state creatinine    CK (with reflex to MB) [[id number]]  (Normal) Collected: 02/09/21 1658    Lab Status: Final result Specimen: Blood from Arm, Right Updated: 02/09/21 1718     Total CK 50 U/L     Protime-INR [[id number]]     Lab Status: No result Specimen: Blood     APTT [[id number]]     Lab Status: No result Specimen: Blood     CBC and differential [[id number]]  (Abnormal) Collected: 02/09/21 1658    Lab Status: Final result Specimen: Blood from Arm, Right Updated: 02/09/21 1703     WBC 14 60 Thousand/uL      RBC 3 39 Million/uL      Hemoglobin 10 1 g/dL      Hematocrit 30 6 %      MCV 90 fL      MCH 29 8 pg      MCHC 33 0 g/dL      RDW 14 5 %      MPV 11 7 fL      Platelets 445 Thousands/uL      nRBC 0 /100 WBCs      Neutrophils Relative 79 %      Immat GRANS % 1 %      Lymphocytes Relative 10 %      Monocytes Relative 8 %      Eosinophils Relative 1 %      Basophils Relative 1 %      Neutrophils Absolute 11 64 Thousands/µL      Immature Grans Absolute 0 10 Thousand/uL      Lymphocytes Absolute 1 40 Thousands/µL      Monocytes Absolute 1 22 Thousand/µL      Eosinophils Absolute 0 11 Thousand/µL      Basophils Absolute 0 13 Thousands/µL                  XR chest 1 view portable   Final Result by Bethene Lesches, MD (02/09 1706)      No acute cardiopulmonary disease  Workstation performed: [id number]                    Procedures  Procedures         ED Course               Identification of Seniors at Risk      Most Recent Value   (ISAR) Identification of Seniors at Risk   Before the illness or injury that brought you to the Emergency, did you need someone to help you on a regular basis?   1 Filed at: 02/09/2021 1639   In the last 24 hours, have you needed more help than usual?  1 Filed at: 02/09/2021 1639   Have you been hospitalized for one or more nights during the past 6 months? 1 Filed at: 02/09/2021 1639   In general, do you see well?  0 Filed at: 02/09/2021 1639   In general, do you have serious problems with your memory? 0 Filed at: 02/09/2021 1639   Do you take more than three different medications every day? 1 Filed at: 02/09/2021 1639   ISAR Score  4 Filed at: 02/09/2021 1639                    SBIRT 22yo+      Most Recent Value   SBIRT (25 yo +)   In order to provide better care to our patients, we are screening all of our patients for alcohol and drug use  Would it be okay to ask you these screening questions? No Filed at: 02/09/2021 1750                    MDM  Number of Diagnoses or Management Options  SILVERIO (acute kidney injury) [facility]):   Dehydration:   Fatigue:   Diagnosis management comments:  28-year-old male generalized fatigue, patient appears dehydrated/ unwell  He is otherwise stable though check EKG CK, reassess  Patient's elevated white blood cell count, could be infected though unclear etiology, will check UA, lactic acid, blood cultures,   Likely given janneth get a biotics      Disposition  Final diagnoses:   Dehydration   SILVERIO (acute kidney injury) ([facility] )   Fatigue     Time reflects when diagnosis was documented in both MDM as applicable and the Disposition within this note     Time User Action Codes Description Comment    2/9/2021  6:41 PM Chito Hernandez Add [E86 0] Dehydration     2/9/2021  6:41 PM Bashir Hernandez Add [N17 9] [facility]     2/9/2021  6:41 PM Mahsa Billings Add [R53 83] Fatigue       ED Disposition     ED Disposition Condition Date/Time Comment    Admit Stable Tue Feb 9, 2021  6:41 PM Case was discussed with JUSTINE and the patient's admission status was agreed to be Admission Status: inpt status to the service of Dr Eliu Zavaleta     Follow-up Information    None         Patient's Medications   Discharge Prescriptions    No medications on file     No discharge procedures on file      PDMP Review       Value Time User    PDMP Reviewed  Yes 3/7/2020 12:10 PM Lilly [facility]          ED Provider  Electronically Signed by           Tiara Richard MD  02/09/21 [id number]

## 2021-02-10 PROBLEM — T83.511A UTI (URINARY TRACT INFECTION) DUE TO URINARY INDWELLING CATHETER (HCC): Status: ACTIVE | Noted: 2021-01-01

## 2021-02-10 PROBLEM — N39.0 UTI (URINARY TRACT INFECTION) DUE TO URINARY INDWELLING CATHETER (HCC): Status: ACTIVE | Noted: 2021-01-01

## 2021-02-10 PROBLEM — E44.0 MODERATE PROTEIN-CALORIE MALNUTRITION (HCC): Status: ACTIVE | Noted: 2021-01-01

## 2021-02-10 NOTE — PROGRESS NOTES
Progress Note - Anton Vera 1940, 79 y o  male MRN: [id number]    Unit/Bed#: -01 Encounter: [id number]    Primary Care Provider: RIKA Armstrong   Date and time admitted to hospital: 2/9/2021  4:18 PM        UTI (urinary tract infection) due to urinary indwelling catheter [facility])  Assessment & Plan  UTI due to chronic indwelling Garcia catheter, as evidenced by UTI in a patient with a chronic indwelling garcia catheter, requiring IV antibiotics and possible change of catheter while inpatient  Switch Zosyn to meropenem  Will deescalate antibiotics according to culture results      Moderate protein-calorie malnutrition (HCC)  Assessment & Plan  Malnutrition Findings:   Adult Malnutrition type: Chronic illness  Adult Degree of Malnutrition: Malnutrition of moderate degree    BMI Findings: Body mass index is 22 66 kg/m²  Nutrition consult, appreciate recommendation  Atrial fibrillation ([address] )  Assessment & Plan  Any new onset atrial fibrillation  [id number] score 4 points  Not on anticoagulation currently  Rate around 80s-106  Cardiology consult, appreciate recommendation  Add metoprolol 12 5 mg bid per cardio    History of Clostridioides difficile infection  Assessment & Plan  Denies any fever, chills, nausea, vomiting and diarrhea  Currently on prophylaxis p o  Vanco    Acute kidney injury superimposed on CKD [facility])  Assessment & Plan  Lab Results   Component Value Date    EGFR 9 02/10/2021    EGFR 8 02/09/2021    EGFR 10 01/31/2021    CREATININE 5 54 (H) 02/10/2021    CREATININE 6 08 (H) 02/09/2021    CREATININE 4 98 (H) 01/31/2021     Baseline creatinine appears to be around 4  CKD stage 5 not on dialysis  nephro consult  Avoid nephro toxins  Elevated d-dimer  Assessment & Plan  D-dimer 2 71  Troponin negative  Denies any chest pain, shortness of breath, palpitation, leg swelling  VTE unlikely according to age-adjusted d dimer   Patient does have a history of Covid recently on 1/24/2021  Continue to monitor vital signs  Generalized weakness  Assessment & Plan  Patient was recommended for STR on prior hospitalization  However, patient was discharged with Home health as his choice of SNF did not have available bed  PT/OT consult  Patient states he prefers to go to [facility]  CM consult  Essential hypertension  Assessment & Plan  BP is 147/60  Continue nifedipine  Will add metoprolol 12 5 mg b i d  given atrial fibrillation and high blood pressure per cardiology    Type 2 diabetes mellitus with hyperglycemia, with long-term current use of insulin [facility])  Assessment & Plan  Lab Results   Component Value Date    HGBA1C 6 8 (H) 09/21/2020       Recent Labs     02/10/21  0825 02/10/21  1219   POCGLU 109 207*       Blood Sugar Average: Last 72 hrs:  (P) 158   Sugar at goal  Acute check and SSI  Currently on a regular diet as per Nutrition    * Sepsis ([address] )  Assessment & Plan  POA, present with tachypnea, leukocytosis, lactic acidosis in the setting of suspected UTI  Sepsis: resolved  Currently, vital signs are stable, leukocytosis is improved  Lactic acidosis has improved after IV hydration  UA consistent with UTI  Patient is asymptomatic  Will follow-up with urine culture  Will switch Zosyn to meropenem given in the history of ESBL and indrawing catheterization  Will consider to deescalate per urine culture result        VTE Pharmacologic Prophylaxis:   Pharmacologic: Heparin  Mechanical VTE Prophylaxis in Place: Yes    Discussions with Specialists or Other Care Team Provider: Yes    Education and Discussions with Family / Patient: Discussed with the patient regarding rehab placement  He states he would like to go to [facility] only  Tried to call patient's sig other, did not , unable to left VM       Current Length of Stay: 1 day(s)    Current Patient Status: Inpatient     Discharge Plan / Estimated Discharge Date: TBD    Code Status: Level 1 - Full Code      Subjective:   He was seen and examined while he was sitting on recliner  He reports he is feeling okay  Denies any fever, chills, nausea, vomiting, diarrhea, any problem with GI or  complaint  No new reports  No acute event overnight  Objective:     Vitals:   Temp (24hrs), Av 1 °F (36 7 °C), Min:97 5 °F (36 4 °C), Max:98 5 °F (36 9 °C)    Temp:  [97 5 °F (36 4 °C)-98 5 °F (36 9 °C)] 98 2 °F (36 8 °C)  HR:  [80-88] 82  Resp:  [16-21] 18  BP: (119-169)/() 147/60  SpO2:  [97 %-100 %] 98 %  Body mass index is 22 66 kg/m²  Input and Output Summary (last 24 hours): Intake/Output Summary (Last 24 hours) at 2/10/2021 1515  Last data filed at 2/10/2021 0303  Gross per 24 hour   Intake 2240 ml   Output --   Net 2240 ml       Physical Exam:     Physical Exam  Vitals signs and nursing note reviewed  Constitutional:       General: He is not in acute distress  Appearance: He is ill-appearing (Chronically)  He is not toxic-appearing or diaphoretic  HENT:      Head: Normocephalic and atraumatic  Eyes:      General: No scleral icterus  Conjunctiva/sclera: Conjunctivae normal    Neck:      Musculoskeletal: Normal range of motion  Cardiovascular:      Rate and Rhythm: Normal rate  Rhythm irregular  Pulses: Normal pulses  Heart sounds: Normal heart sounds  No murmur  No friction rub  Pulmonary:      Effort: Pulmonary effort is normal  No respiratory distress  Breath sounds: Normal breath sounds  No stridor  No wheezing or rhonchi  Abdominal:      General: Abdomen is flat  Bowel sounds are normal  There is no distension  Palpations: Abdomen is soft  Tenderness: There is no abdominal tenderness  There is no guarding  Genitourinary:     Comments: Indwelling urinary catheter in placed  Musculoskeletal: Normal range of motion  General: No swelling or tenderness  Right lower leg: No edema  Left lower leg: No edema     Skin: General: Skin is warm and dry  Capillary Refill: Capillary refill takes less than 2 seconds  Coloration: Skin is not pale  Findings: No rash  Neurological:      Mental Status: He is alert and oriented to person, place, and time  Motor: Weakness ( generalized) present  Psychiatric:         Mood and Affect: Mood normal            Additional Data:     Labs:    Results from last 7 days   Lab Units 02/10/21  0521   WBC Thousand/uL 6 69   HEMOGLOBIN g/dL 7 9*   HEMATOCRIT % 24 1*   PLATELETS Thousands/uL 131*   NEUTROS PCT % 62   LYMPHS PCT % 23   MONOS PCT % 13*   EOS PCT % 1     Results from last 7 days   Lab Units 02/10/21  0521 02/09/21  1658   POTASSIUM mmol/L 3 6 3 8   CHLORIDE mmol/L 110* 102   CO2 mmol/L 21 21   BUN mg/dL 44* 49*   CREATININE mg/dL 5 54* 6 08*   CALCIUM mg/dL 7 9* 8 4   ALK PHOS U/L  --  403*   ALT U/L  --  77   AST U/L  --  40           * I Have Reviewed All Lab Data Listed Above  * Additional Pertinent Lab Tests Reviewed: All [facility] Admission Reviewed      Recent Cultures (last 7 days):     Results from last 7 days   Lab Units 02/09/21  1721 02/09/21  1713   BLOOD CULTURE  Received in Microbiology Lab  Culture in Progress  Received in Microbiology Lab  Culture in Progress         Last 24 Hours Medication List:   Current Facility-Administered Medications   Medication Dose Route Frequency Provider Last Rate    acetaminophen  650 mg Oral Q6H PRN Rm Burroughs MD      aspirin  81 mg Oral Daily Best BAUTISTA MD      heparin (porcine)  5,000 Units Subcutaneous Q8H [address] Best BAUTISTA MD      insulin lispro  1-5 Units Subcutaneous 4x Daily (AC & HS) Thiago Perez PA-C      meropenem  500 mg Intravenous Q12H Early MD Jonh      metoprolol tartrate  12 5 mg Oral Q12H [address] Maribell Blount MD      NIFEdipine ER  60 mg Oral Daily Best BAUTISTA MD      pantoprazole  40 mg Oral Daily Best BAUTISTA MD      sodium bicarbonate  325 mg Oral BID Best Daniela Guy MD      tamsulosin  0 4 mg Oral Daily With Dinner Hemant Santana MD      vancomycin  125 mg Oral Q12H Deondre Chisholm MD          Today, Patient Was Seen By: Neha Guillen MD    ** Please Note: This note has been constructed using a voice recognition system   **

## 2021-02-10 NOTE — RESPIRATORY THERAPY NOTE
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)    Urinary catheter in place    Acute kidney injury superimposed on CKD ([facility] [address] )    History of Clostridioides difficile infection    Atrial fibrillation ([facility] )      Home Pulmonary Medications:  None  Home Devices/Therapy: (P) (none)    Past Medical History:   Diagnosis Date    Acute pancreatitis without infection or necrosis 9/26/2019    Age-related nuclear cataract, right 11/5/2020    Anemia of chronic renal failure     BPH (benign prostatic hyperplasia)     CKD (chronic kidney disease) stage 5, GFR less than 15 ml/min (HCC)     Coronary artery disease     COVID-19     DJD (degenerative joint disease)     Essential hypertension     Gait disturbance     Generalized weakness     GERD (gastroesophageal reflux disease)     Gout     History of transfusion     Hydronephrosis     Hyperlipidemia     Hypertension     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)     Pressure injury of skin     Proteinuria     Thrombophlebitis migrans     Type 2 diabetes mellitus      Social History     Socioeconomic History    Marital status:       Spouse name: None    Number of children: None    Years of education: None    Highest education level: None   Occupational History    None   Social Needs    Financial resource strain: None    Food insecurity     Worry: None     Inability: None    Transportation needs     Medical: None     Non-medical: None   Tobacco Use    Smoking status: Never Smoker    Smokeless tobacco: Never Used   Substance and Sexual Activity    Alcohol use: Never     Alcohol/week: 0 0 standard drinks     Frequency: Never    Drug use: Never    Sexual activity: Not Currently   Lifestyle    Physical activity     Days per week: None     Minutes per session: None    Stress: None   Relationships    Social connections     Talks on phone: None     Gets together: None     Attends Confucianism service: None     Active member of club or organization: None     Attends meetings of clubs or organizations: None     Relationship status: None    Intimate partner violence     Fear of current or ex partner: None     Emotionally abused: None     Physically abused: None     Forced sexual activity: None   Other Topics Concern    None   Social History Narrative    None       Subjective         Objective    Physical Exam:   Assessment Type: (P) Assess only  General Appearance: (P) Alert, Awake  Respiratory Pattern: (P) Normal  Chest Assessment: (P) Chest expansion symmetrical  Bilateral Breath Sounds: (P) Diminished  Cough: (P) None  O2 Device: (P) room air    Vitals:  Blood pressure 145/78, pulse (P) 84, temperature 97 9 °F (36 6 °C), resp  rate 18, weight 67 6 kg (149 lb 0 5 oz), SpO2 (P) 97 %  Imaging and other studies: I have personally reviewed pertinent reports  O2 Device: (P) room air     Plan    Respiratory Plan: (P) Discontinue Protocol, No distress/Pulmonary history        Resp Comments: (P) no pulmonary history, no SOB, no coughing  pt comfortable on room air

## 2021-02-10 NOTE — CONSULTS
Consultation - Cardiology Team One  Anton Vera 79 y o  male MRN: [id number]  Unit/Bed#: -01 Encounter: [id number]          Inpatient consult to Cardiology     Performed by  Louisa Winter MD     Authorized by Baron Gita MD              Physician Requesting Consult: Jessica Duvall MD  Reason for Consult / Principal Problem: Atrial fibrillation - Unknown duration     Assessment/Plan:  1  NSR with PACs, with possible paroxsymal afib  - EKG on admission appears to be in atrial fibrillation but currently in a moderate effusion appears to be in sinus rhythm with PACs  - troponin x1 negative  - echo done in December showed 60% EF with no regional wall motion abnormality and grade 1 diastolic dysfunction  Mild TR was also noted and PA pressures were normal   - it is unlikely that this is causing his syncope/generalized weakness  - CHADSVASC = 4, but given his advanced age, imbalance, anemia  And thrombocytopenia, would not recommend anticoagulation  2  Sepsis on admission likely secondary to UTI currently on antibiotics per primary team    3  SILVERIO on CKD 5 - nephrology on consult    4  History of metastatic neuroendocrine neoplasm to the liver  5  Chronic indwelling Haile catheter     6  Type 2 diabetes mellitus, A1c of 6 8  On insulin    7  Covid 19 positive on 01/24/2021 - Currently on room air           HPI: Cardiologist Dr Rafael Bond is a 79y o  year old male who has significant past medical history including but not limited to CKD 5, hyperlipidemia, type 2 diabetes, hypertension, hypothyroidism, ? CAD, urinary retention with chronic Haile catheter, recent COVID 19 positive for 1/24/2021, neuroendocrine neoplasm metastatic to the liver, currently admitted for generalized weakness and ? Syncope  History is obtained from chart review as patient is a poor historian  The patient is not sure why he is in the hospital   He denies any chest pain or difficulty breathing    He denies any orthopnea or PND type symptoms, peripheral edema  No abdominal pain, nausea, vomiting, lightheadedness, palpitations  He is currently being managed for sepsis secondary to UTI with IV antibiotics  Cardiology was consulted for atrial fibrillation was noted on EKG, and concern for an arrhythmia causing symptoms of syncope  EKG done on admission shows AFib with left axis deviation, rate controlled at 90 per minute  EKG in August of 2020 showed sinus rhythm with PACs  EKG in January 20 01/20/2028 showed atrial fibrillation vs NSR with PAC difficult to differentiate given baseline artifacts  REVIEW OF SYSTEMS:  Constitutional:  Denies fever or chills   Eyes:  Denies change in visual acuity   HENT:  Denies nasal congestion or sore throat   Respiratory:  Denies cough, orthopnea, PND or shortness of breath   Cardiovascular:  Denies chest pain, palpitations or edema   GI:  Denies abdominal pain, nausea, vomiting, bloody stools or diarrhea   :  Has chronic indwelling Haile catheter    Musculoskeletal:  Denies back pain or joint pain   Neurologic:  Denies headache, focal weakness or sensory changes   Endocrine:  Denies polyuria or polydipsia   Lymphatic:  Denies swollen glands   Psychiatric:  Denies depression or anxiety     Historical Information   Past Medical History:   Diagnosis Date    Acute pancreatitis without infection or necrosis 9/26/2019    Age-related nuclear cataract, right 11/5/2020    Anemia of chronic renal failure     BPH (benign prostatic hyperplasia)     CKD (chronic kidney disease) stage 5, GFR less than 15 ml/min (HCC)     Coronary artery disease     COVID-19     DJD (degenerative joint disease)     Essential hypertension     Gait disturbance     Generalized weakness     GERD (gastroesophageal reflux disease)     Gout     History of transfusion     Hydronephrosis     Hyperlipidemia     Hypertension     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 ([facility] [address] )     Pressure injury of skin     Proteinuria     Thrombophlebitis migrans     Type 2 diabetes mellitus      Past Surgical History:   Procedure Laterality Date    CATARACT EXTRACTION Right 11/05/2020    CATARACT EXTRACTION W/ INTRAOCULAR LENS IMPLANT Right 11/5/2020    Procedure: EYE OD CATARACT EXTRACTION WITH IOL INSERTION;  Surgeon: Jonathon Camejo MD;  Location: [address] [facility] MAIN OR;  Service: Ophthalmology    CHOLECYSTECTOMY     Nerissa Donald N/A 2/7/2020    Procedure: CHOLECYSTECTOMY LAPAROSCOPIC;  Surgeon: Saji Griffith MD;  Location: [address] MAIN OR;  Service: General    CORONARY ANGIOPLASTY WITH STENT PLACEMENT      FL RETROGRADE PYELOGRAM  5/21/2020    IR BIOPSY LIVER MASS  1/24/2019    IR CHOLECYSTOSTOMY TUBE PLACEMENT  9/6/2019    IR NON-TUNNELED CENTRAL LINE PLACEMENT  1/28/2021    OR CYSTO/URETERO W/LITHOTRIPSY &INDWELL STENT INSRT Left 5/21/2020    Procedure: CYSTOSCOPY URETEROSCOPY WITH LITHOTRIPSY HOLMIUM LASER, RETROGRADE PYELOGRAM AND INSERTION STENT URETERAL;  Surgeon: Mingo Marquez MD;  Location: [facility] MAIN OR;  Service: Urology    OR CYSTOURETHROSCOPY,URETER CATHETER Left 4/21/2020    Procedure: CYSTOSCOPY WITH INSERTION STENT URETERAL;  Surgeon: Yoan Mariano MD;  Location: [facility] Main [facility];  Service: Urology     Social History     Substance and Sexual Activity   Alcohol Use Never    Alcohol/week: 0 0 standard drinks    Frequency: Never     Social History     Substance and Sexual Activity   Drug Use Never     Social History     Tobacco Use   Smoking Status Never Smoker   Smokeless Tobacco Never Used       Family History: Unknown      MEDS & ALLERGIES:  all current active meds have been reviewed and current meds:   Current Facility-Administered Medications   Medication Dose Route Frequency    acetaminophen (TYLENOL) tablet 650 mg  650 mg Oral Q6H PRN    aspirin chewable tablet 81 mg  81 mg Oral Daily    heparin (porcine) subcutaneous injection 5,000 Units  5,000 Units Subcutaneous Q8H [facility]    insulin lispro (HumaLOG) 100 units/mL subcutaneous injection 1-5 Units  1-5 Units Subcutaneous 4x Daily (AC & HS)    NIFEdipine (PROCARDIA XL) 24 hr tablet 60 mg  60 mg Oral Daily    pantoprazole (PROTONIX) EC tablet 40 mg  40 mg Oral Daily    piperacillin-tazobactam (ZOSYN) 2 25 g in sodium chloride 0 9 % 50 mL IVPB  2 25 g Intravenous Q8H    sodium bicarbonate tablet 325 mg  325 mg Oral BID    tamsulosin (FLOMAX) capsule 0 4 mg  0 4 mg Oral Daily With Dinner    vancomycin (VANCOCIN) oral solution 125 mg  125 mg Oral Q12H [facility]        No Known Allergies    OBJECTIVE:  Vitals:   Vitals:    02/10/21 0730   BP: 160/79   Pulse:    Resp:    Temp: 98 2 °F (36 8 °C)   SpO2:      Body mass index is 22 66 kg/m²  Systolic (92AOO), NTC:932 , Min:119 , TBC:184     Diastolic (56LWP), JGC:38, Min:63, Max:120      Intake/Output Summary (Last 24 hours) at 2/10/2021 1307  Last data filed at 2/10/2021 0303  Gross per 24 hour   Intake 2240 ml   Output --   Net 2240 ml     Weight (last 2 days)     Date/Time   Weight    02/09/21 1629   67 6 (149 03)            Invasive Devices     Peripheral Intravenous Line            Peripheral IV 02/09/21 Right Antecubital less than 1 day          Drain            Urethral Catheter -- days                PHYSICAL EXAMS:  General:  Patient sitting in his chair comfortably  Saturating on room air  Head: Normocephalic, Atraumatic  HEENT:  Pale conjunctiva  Neck:  Supple, no neck vein distention  Respiratory:  Clear to auscultate bilaterally  Cardiovascular:  Regular ,S1-S2 normal, no murmurs,, no pedal edema  GI:  Abdomen soft, non-tender, non-distended  Extremities: No edema, normal pulses  Lymphatic:  No cervical or inguinal lymphadenopathy  Neurologic:  Patient is awake alert oriented to person and time but not place      LABORATORY RESULTS:  Results from last 7 days   Lab Units 02/09/21  1658   CK TOTAL U/L 50   TROPONIN I ng/mL <0 02     CBC with diff:   Results from last 7 days   Lab Units 02/10/21  0521 21  1658   WBC Thousand/uL 6 69 14 60*   HEMOGLOBIN g/dL 7 9* 10 1*   HEMATOCRIT % 24 1* 30 6*   MCV fL 92 90   PLATELETS Thousands/uL 131* 216   MCH pg 30 0 29 8   MCHC g/dL 32 8 33 0   RDW % 14 3 14 5   MPV fL 11 4 11 7   NRBC AUTO /100 WBCs 0 0       CMP:  Results from last 7 days   Lab Units 02/10/21  0521 21  1658   POTASSIUM mmol/L 3 6 3 8   CHLORIDE mmol/L 110* 102   CO2 mmol/L 21 21   BUN mg/dL 44* 49*   CREATININE mg/dL 5 54* 6 08*   CALCIUM mg/dL 7 9* 8 4   AST U/L  --  40   ALT U/L  --  77   ALK PHOS U/L  --  403*   EGFR ml/min/1 73sq m 9 8       BMP:  Results from last 7 days   Lab Units 02/10/21  0521 21  1658   POTASSIUM mmol/L 3 6 3 8   CHLORIDE mmol/L 110* 102   CO2 mmol/L 21 21   BUN mg/dL 44* 49*   CREATININE mg/dL 5 54* 6 08*   CALCIUM mg/dL 7 9* 8 4                              Lipid Profile:   No results found for: CHOL  Lab Results   Component Value Date    HDL 50 2020     Lab Results   Component Value Date    LDLCALC 78 2020     Lab Results   Component Value Date    TRIG 35 2020       Cardiac testing:   Results for orders placed during the hospital encounter of 20   Echo complete with contrast if indicated    Narrative [address] [place] [address] [place], [place] [address] [phone]    Transthoracic Echocardiogram  2D, M-mode, Doppler, and Color Doppler    Study date:  2020    Patient: Tiago Blas  MR number: [id number]  Account number: [de-identified]  : 1940  Age: 79 years  Gender: Male  Status: Outpatient  Location: Emergency room  Height: 68 in  Weight: 160 lb  BP: 129/ 64 mmHg    Indications: Cardiomyopathy, Assess left ventricular function      Diagnoses: I42 9 - Cardiomyopathy, unspecified    Sonographer:   [facility]  Referring Physician:  Michael Sims DO  Group:  [id number]  Interpreting Physician:  Raghavendra Huerta, MD    IMPRESSIONS:  Technically difficult study, poor endocardial resolution, off axis view, definity was used  Poor valvular visualization  No significant changes as compred to previous echo    SUMMARY    LEFT VENTRICLE:  Systolic function was normal  LVEF >55%  Although no diagnostic regional wall motion abnormality was identified, this possibility cannot be completely excluded on the basis of this study  Wall thickness was at the upper limits of normal   Doppler parameters were consistent with abnormal left ventricular relaxation (grade 1 diastolic dysfunction)  VENTRICULAR SEPTUM:  There was paradoxical motion  These changes are consistent with LBBB  RIGHT VENTRICLE:  The size was at the upper limits of normal     MITRAL VALVE:  There was mild annular calcification  There was trace regurgitation  TRICUSPID VALVE:  not well visualized  There was mild regurgitation  HISTORY: Syncope  PRIOR HISTORY: CAD, Risk factors: hypertension, diabetes, and hypercholesterolemia  PROCEDURE: The procedure was performed in the emergency room  This was a routine study  The transthoracic approach was used  The study included complete 2D imaging, M-mode, complete spectral Doppler, and color Doppler  The heart rate was  71 bpm, at the start of the study  Images were obtained from the parasternal, apical, subcostal, and suprasternal notch acoustic windows  Intravenous contrast (  4mL of Definity in NSS) was administered to opacify the left ventricle  Echocardiographic views were limited due to poor acoustic window availability, decreased penetration, and lung interference  This was a technically difficult study  LEFT VENTRICLE: Size was normal  Systolic function was normal  LVEF >55% Although no diagnostic regional wall motion abnormality was identified, this possibility cannot be completely excluded on the basis of this study   Wall thickness was  at the upper limits of normal  DOPPLER: Doppler parameters were consistent with abnormal left ventricular relaxation (grade 1 diastolic dysfunction)  VENTRICULAR SEPTUM: There was paradoxical motion  These changes are consistent with LBBB  RIGHT VENTRICLE: The size was at the upper limits of normal  Systolic function was low normal     LEFT ATRIUM: Size was at the upper limits of normal     RIGHT ATRIUM: Size was normal     MITRAL VALVE: There was mild annular calcification  DOPPLER: There was no evidence for stenosis  There was trace regurgitation  AORTIC VALVE: The valve was probably trileaflet  Leaflets exhibited mildly increased thickness  DOPPLER: There was no evidence for stenosis  There was no regurgitation  TRICUSPID VALVE: not well visualized There was mild diffuse thickening  DOPPLER: There was mild regurgitation  Pulmonary artery systolic pressure was mildly increased  PULMONIC VALVE: Not well visualized  PERICARDIUM: There was no pericardial effusion  AORTA: The root exhibited normal size  SYSTEMIC VEINS: IVC: The inferior vena cava was normal in size  Respirophasic changes were normal     SYSTEM MEASUREMENT TABLES    2D  %FS: 29 %  Ao Diam: 3 cm  EDV(Teich): 93 9 ml  EF(Teich): 55 8 %  ESV(Teich): 41 5 ml  IVSd: 0 9 cm  LA Area: 12 7 cm2  LA Diam: 3 8 cm  LVIDd: 4 5 cm  LVIDs: 3 2 cm  LVPWd: 0 8 cm  RA Area: 10 1 cm2  RVIDd: 2 8 cm  SV(Teich): 52 4 ml    CW  TR Vmax: 3 m/s  TR maxP 3 mmHg    MM  TAPSE: 2 5 cm    PW  E': 0 1 m/s  E/E': 12 1  MV A Antonio: 1 2 m/s  MV Dec [place]: 3 m/s2  MV DecT: 227 7 ms  MV E Antonio: 0 7 m/s  MV E/A Ratio: 0 6  MV PHT: 66 ms  MVA By PHT: 3 3 cm2    [employer] Accredited [facility]chocardi[facility]    Prepared and electronically signed by    Sheldon Valencia MD  Signed 2020 15:17:11       No results found for this or any previous visit  No procedure found  No results found for this or any previous visit  Imaging:   I have personally reviewed pertinent reports          EKG reviewed personally:  Atrial fibrillation, left axis deviation  No acute ST T wave changes  Rate control  Telemetry reviewed personally:   Sinus rhythm with PACs        Code Status: Level 1 - Full Code      Marlene Niño MD  2/10/2021,1:07 PM

## 2021-02-10 NOTE — MALNUTRITION/BMI
This medical record reflects one or more clinical indicators suggestive of malnutrition and/or morbid obesity  Malnutrition Findings:   Adult Malnutrition type: Chronic illness  Adult Degree of Malnutrition: Malnutrition of moderate degree  Malnutrition Characteristics: Inadequate energy, Weight loss(significant weight loss of 5% within past month, 10% x 6 months; less than 75% energy intakes compared to estimated needs for at least past month; Intervention-Regular diet with Ensure Enlive BID)    BMI Findings: Body mass index is 22 66 kg/m²  See Nutrition note dated 2/10/21 for additional details  Completed nutrition assessment is viewable in the nutrition documentation

## 2021-02-10 NOTE — H&P
H&P- Phong Sanchez 1940, 79 y o  male MRN: [id number]    Unit/Bed#: ED 17 Encounter: [id number]    Primary Care Provider: RIKA Llamas   Date and time admitted to hospital: 2/9/2021  4:18 PM        * Sepsis [facility])  Assessment & Plan  79year old male with past medical history of chronic indwelling Haile catheter, presented with tachypnea, leukocytosis, lactic acidosis, UA indicative of UTI  Noted with leukocytosis, lactic acidosis  Chest x-ray report negative for acute finding  Sepsis present admission likely due to UTI in the settings of chronic indwelling Haile catheter  Currently patient is hemodynamically stable  Previous urine culture noted with ESBL   Will start on IV Zosyn renally adjusted does for now  Will also start on p o  vancomycin for C diff prophylaxis  Follow-up with pending blood and urine culture  Continue with IV hydration and monitor lactic acid until normal   Continue supportive care  Consider ID consult if not improving or worsening  Atrial fibrillation [facility])  Assessment & Plan  EKG on presentation noted with AFib rate controlled  Not previous records to indicated hx of A fib  EKG on 1/28/2021 also noted with AFib  Appears fairly new diagnosis  Not sure if contributing syncopal episode  Patient is on anticoagulation  Chads 2 Vasc score 4  Continue telemetry monitoring  Follow-up with Cardiology consult        History of Clostridioides difficile infection  Assessment & Plan  Continue with p o  Vancomycin prophylaxis since patient is  on systemic IV antibiotics    Acute kidney injury superimposed on CKD [facility])  Assessment & Plan  Lab Results   Component Value Date    EGFR 8 02/09/2021    EGFR 10 01/31/2021    EGFR 10 01/30/2021    CREATININE 6 08 (H) 02/09/2021    CREATININE 4 98 (H) 01/31/2021    CREATININE 5 08 (H) 01/30/2021     Present on admission with history CKD stage 5  Baseline creatinine is around 4 9 range    Currently noted 6 08 in the settings of sepsis  Gentle IV hydration with caution and monitor renal function  Consider nephrology consult if not improving or worsening  Urinary catheter in place  Assessment & Plan  Present admission history of urinary retention with chronic Haile catheter  Was unable to get hold of wife  Not sure vanc Haile catheter was changed  F/u with wife tomorrow for more info and consider changing it while inpt  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 [facility])  Assessment & Plan  Present admission history of 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  Currently on monthly lanreotide subcu injections  Continue follow-up with Heme-Onc outpatient  Essential hypertension  Assessment & Plan  Present admission history of hypertension  Controlled  Resume home dose of nifedipine  Monitor vitals per unit protocol  Type 2 diabetes mellitus with hyperglycemia, with long-term current use of insulin (HCC)  Assessment & Plan  Lab Results   Component Value Date    HGBA1C 6 8 (H) 09/21/2020       No results for input(s): POCGLU in the last 72 hours  Blood Sugar Average: Last 72 hrs:     Present on admission with history of diabetes  Most recent A1c 6 8  Controlled  Seems like Levemir discontinued since now controlled when patient was discharged from [facility]  Diabetic diet  Sliding scale coverage  VTE Prophylaxis: Heparin     Code Status:  Level 1 full code  POLST: POLST form is not discussed and not completed at this time  Discussion with family:  Not present at bedside  Called wife Latonia Zendejas [phone] x [phone] around 8:40 pm today but she did not answer phone, no option to leave voice message since it was full  Anticipated Length of Stay:  Patient will be admitted on an Inpatient basis with an anticipated length of stay of  > 2 midnights  Justification for Hospital Stay:  Sepsis secondary to UTI    Total Time for Visit, including Counseling / Coordination of Care: 1 hour    Greater than 50% of this total time spent on direct patient counseling and coordination of care  Chief Complaint:  Generalized weakness, syncopal episode at home  History of Present Illness:    Jessy Mcgraw is a 79 y o  male patient with recent hospitalization due to COVID-19 pneumonia at [facility], CKD stage 5, CAD, 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, 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,  hypertension, diabetes, patient was recently discharged from [address] [place] [address] after tx for COVID 19, at that time PT had recommended rehab but patient was discharged home as per family's request,  who presents to [facility] due to generalized weakness, syncope  Patient is a poor historian  History obtained from reviewing chart  Seems like since discharge from [facility] he was not seen by [facility] suspected due to weather conditions and was seen by [facility] today and he found to be generally fatigued and lethargic the point where he had an episode of syncope when attempted to move him to chair  Currently on my encounter patient is awake, alert, oriented at his baseline  Poor historian  Denies chest pain, dyspnea, fever, chills, any other complaints  No other events reported  Level 1 full code based on patient and previous records  Unable to get hold of his wife  Review of Systems:    Review of Systems   Constitutional: Negative for chills, fatigue and fever  HENT: Negative for congestion  Eyes: Negative for visual disturbance  Respiratory: Negative for cough and shortness of breath  Gastrointestinal: Negative for abdominal pain  Musculoskeletal: Negative for neck stiffness  Neurological: Positive for weakness         Past Medical and Surgical History:     Past Medical History:   Diagnosis Date    Acute pancreatitis without infection or necrosis 9/26/2019    Age-related nuclear cataract, right 11/5/2020    Anemia of chronic renal failure     BPH (benign prostatic hyperplasia)     CKD (chronic kidney disease) stage 5, GFR less than 15 ml/min (HCC)     Coronary artery disease     COVID-19     DJD (degenerative joint disease)     Essential hypertension     Gait disturbance     Generalized weakness     GERD (gastroesophageal reflux disease)     Gout     History of transfusion     Hydronephrosis     Hyperlipidemia     Hypertension     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)     Pressure injury of skin     Proteinuria     Thrombophlebitis migrans     Type 2 diabetes mellitus        Past Surgical History:   Procedure Laterality Date    CATARACT EXTRACTION Right 11/05/2020    CATARACT EXTRACTION W/ INTRAOCULAR LENS IMPLANT Right 11/5/2020    Procedure: EYE OD CATARACT EXTRACTION WITH IOL INSERTION;  Surgeon: Live Crandall MD;  Location: [facility] MAIN OR;  Service: Ophthalmology    CHOLECYSTECTOMY     [address] [place] [address] N/A 2/7/2020    Procedure: CHOLECYSTECTOMY LAPAROSCOPIC;  Surgeon: Gerardo Felix MD;  Location: [facility] MAIN OR;  Service: General    CORONARY ANGIOPLASTY WITH STENT PLACEMENT      FL RETROGRADE PYELOGRAM  5/21/2020    IR BIOPSY LIVER MASS  1/24/2019    IR CHOLECYSTOSTOMY TUBE PLACEMENT  9/6/2019    IR NON-TUNNELED CENTRAL LINE PLACEMENT  1/28/2021    CO CYSTO/URETERO W/LITHOTRIPSY &INDWELL STENT INSRT Left 5/21/2020    Procedure: CYSTOSCOPY URETEROSCOPY WITH LITHOTRIPSY HOLMIUM LASER, RETROGRADE PYELOGRAM AND INSERTION STENT URETERAL;  Surgeon: Heather Dallas MD;  Location: [facility] MAIN OR;  Service: Urology    CO CYSTOURETHROSCOPY,URETER CATHETER Left 4/21/2020    Procedure: CYSTOSCOPY WITH INSERTION STENT URETERAL;  Surgeon: Martín Salazar MD;  Location: [facility] Main [facility];  Service: Urology       Meds/Allergies:    Prior to Admission medications    Medication Sig Start Date End Date Taking?  Authorizing Provider   acetaminophen (TYLENOL) 325 mg tablet Take 650 mg by mouth every 6 (six) hours as needed     Historical Provider, MD   ascorbic acid (VITAMIN C) 1000 MG tablet Take 1 tablet (1,000 mg total) by mouth every 12 (twelve) hours for 6 doses 1/28/21   Sundeep Conti MD   aspirin 81 MG tablet Take 81 mg by mouth daily    Historical Provider, MD   B Complex-C-Folic Acid (triphrocaps) 1 MG CAPS Take 1 capsule (1 mg total) by mouth daily with dinner 2/4/21   Regina August DO   cholecalciferol (VITAMIN D3) 1,000 units tablet Take 1,000 Units by mouth daily Take 2    Historical Provider, MD   colchicine (COLCRYS) 0 6 mg tablet Take 1 tablet (0 6 mg total) by mouth daily  Patient taking differently: Take 0 3 mg by mouth daily  1/20/21   Regina August DO   famotidine (PEPCID) 10 mg tablet Take 1 tablet (10 mg total) by mouth daily at bedtime 8/11/20   RIKA King   magnesium oxide (MAG-OX) 400 mg Take 0 5 tablets (200 mg total) by mouth 2 (two) times a day 1/20/21 2/19/21  Regina August DO   NIFEdipine ER (ADALAT CC) 60 MG 24 hr tablet Take 1 tablet (60 mg total) by mouth daily 12/21/20   Gabino Brown MD   pantoprazole (PROTONIX) 40 mg tablet Take 1 tablet (40 mg total) by mouth daily 11/10/20   RIKA Cabrera [place]   sodium bicarbonate 650 mg tablet Take 325 mg by mouth 2 (two) times a day     Historical Provider, MD   sucralfate (CARAFATE) 1 g/10 mL suspension Take 10 mL (1,000 mg total) by mouth every 6 (six) hours 8/11/20   RIKA King   tamsulosin [facility]) 0 4 mg Take 1 capsule (0 4 mg total) by mouth daily with dinner 2/4/21   Regina August DO   vitamin B-12 (VITAMIN B-12) 1,000 mcg tablet Take 100 mcg by mouth daily     Historical Provider, MD   zinc sulfate (ZINCATE) 220 mg capsule Take 1 capsule (220 mg total) by mouth daily for 3 doses 1/29/21 2/1/21  Sundeep Conti MD     I have reviewed home medications with a medical source (PCP, Pharmacy, other)  Allergies: No Known Allergies    Social History:     Marital Status:     Patient Pre-hospital Living Situation:  With wife  Patient Pre-hospital Level of Mobility:  Unclear  Patient Pre-hospital Diet Restrictions:  None reported  Substance Use History:   Social History     Substance and Sexual Activity   Alcohol Use Never    Alcohol/week: 0 0 standard drinks    Frequency: Never     Social History     Tobacco Use   Smoking Status Never Smoker   Smokeless Tobacco Never Used     Social History     Substance and Sexual Activity   Drug Use Never       Family History:    non-contributory    Physical Exam:     Vitals:   Blood Pressure: 145/68 (02/09/21 1930)  Pulse: 80 (02/09/21 1930)  Temperature: 97 5 °F (36 4 °C) (02/09/21 1639)  Temp Source: Oral (02/09/21 1639)  Respirations: 21 (02/09/21 1930)  Weight - Scale: 67 6 kg (149 lb 0 5 oz) (02/09/21 1629)  SpO2: 99 % (02/09/21 1930)    Physical Exam  Constitutional:       General: He is not in acute distress  Appearance: Normal appearance  He is ill-appearing  Comments: Elderly, deconditioned, chronically ill-appearing male patient, acutely nontoxic appearing  HENT:      Head: Normocephalic and atraumatic  Neck:      Musculoskeletal: Normal range of motion and neck supple  No neck rigidity  Cardiovascular:      Rate and Rhythm: Normal rate and regular rhythm  Pulses: Normal pulses  Heart sounds: Normal heart sounds  Pulmonary:      Effort: Pulmonary effort is normal  No respiratory distress  Breath sounds: Normal breath sounds  No stridor  Abdominal:      General: Bowel sounds are normal  There is no distension  Palpations: Abdomen is soft  Tenderness: There is no abdominal tenderness  Genitourinary:     Comments: Haile catheter noted with cloudy, dark yellow/orange tinged urine  Musculoskeletal: Normal range of motion  Neurological:      General: No focal deficit present  Mental Status: He is alert  Mental status is at baseline  Comments: Poor historian    Cooperative during exam    Psychiatric:         Behavior: Behavior normal  Additional Data:     Lab Results: I have personally reviewed pertinent reports  Results from last 7 days   Lab Units 02/09/21  1658   WBC Thousand/uL 14 60*   HEMOGLOBIN g/dL 10 1*   HEMATOCRIT % 30 6*   PLATELETS Thousands/uL 216   NEUTROS PCT % 79*   LYMPHS PCT % 10*   MONOS PCT % 8   EOS PCT % 1     Results from last 7 days   Lab Units 02/09/21  1658   SODIUM mmol/L 136   POTASSIUM mmol/L 3 8   CHLORIDE mmol/L 102   CO2 mmol/L 21   BUN mg/dL 49*   CREATININE mg/dL 6 08*   ANION GAP mmol/L 13   CALCIUM mg/dL 8 4   ALBUMIN g/dL 2 6*   TOTAL BILIRUBIN mg/dL 0 60   ALK PHOS U/L 403*   ALT U/L 77   AST U/L 40   GLUCOSE RANDOM mg/dL 294*                 Results from last 7 days   Lab Units 02/09/21  1712   LACTIC ACID mmol/L 3 9*       Imaging: I have personally reviewed pertinent reports  XR chest 1 view portable   Final Result by Anup Mosqueda MD (02/09 1706)      No acute cardiopulmonary disease  Workstation performed: [id number]             EKG, Pathology, and Other Studies Reviewed on Admission:   · EKG:  AFib with rate control  Allscripts / Epic Records Reviewed: Yes     ** Please Note: This note has been constructed using a voice recognition system   **

## 2021-02-10 NOTE — OCCUPATIONAL THERAPY NOTE
Occupational Therapy Evaluation Note        Patient Name: Analisa Davidson  ZRDEP'N Date: 2/10/2021       02/10/21 0945   OT Last Visit   OT Visit Date 02/10/21   Note Type   Note type Evaluation   Restrictions/Precautions   Weight Bearing Precautions Per Order No   Pain Assessment   Pain Assessment Tool FLACC   Pain Rating: FLACC (Rest) - Face 0   Pain Rating: FLACC (Rest) - Legs 0   Pain Rating: FLACC (Rest) - Activity 0   Pain Rating: FLACC (Rest) - Cry 0   Pain Rating: FLACC (Rest) - Consolability 0   Score: FLACC (Rest) 0   Pain Rating: FLACC (Activity) - Face 1   Pain Rating: FLACC (Activity) - Legs 0   Pain Rating: FLACC (Activity) - Activity 0   Pain Rating: FLACC (Activity) - Cry 0   Pain Rating: FLACC (Activity) - Consolability 0   Score: FLACC (Activity) 1   Home Living   Type of Home House   Home Layout One level;Performs ADLs on one level; Able to live on main level with bedroom/bathroom; Access; Ramped entrance   [email or web address] Grab bars in shower; Shower chair;Commode   [facility]; Wheelchair-manual   Prior Function   Level of [place] Needs assistance with IADLs; Needs assistance with ADLs and functional mobility   Lives With Spouse   Receives Help From Family   ADL Assistance Needs assistance   IADLs Needs assistance   Falls in the last 6 months 1 to 4   Vocational Retired   [address] setup and PLOF obtained from recent OT evaluation on 1/25/21; patient unable to provide home setup and PLOF at this time as pt is poor historian at baseline; CM to follow up to ensure accuracy of information listed above   Lifestyle   Autonomy Per chart review, patient lives with his spouse in a one-story home with a ramped entrance  At baseline, patient requries assistance with both ADLs and IADLs and uses a walker for functional mobility     Reciprocal Relationships Per chart review, patient lives with spouse  Service to Others Retired from [address]  [address]  [address] 3  Moderate Assistance    [facility] [address] 2  [address]  2  [address] 3  Moderate Assistance   Additional Comments ADL assist levels based on pt's functional performance during OT evaluation   Bed Mobility   Supine to Sit 3  Moderate assistance   Additional items HOB elevated; Increased time required;Verbal cues;LE management;Assist x 1   Additional Comments Patient received lying supine in bed upon OT arrival; at end of session: pt seated OOB to recliner chair w/ all needs within reach and chair alarm activated   Transfers   Sit to Stand 3  Moderate assistance   Additional items Assist x 2; Increased time required;Verbal cues   Stand to Sit 3  Moderate assistance   Additional items Assist x 2; Increased time required;Verbal cues;Armrests   Toilet transfer 3  Moderate assistance   Additional items Assist x 2;Armrests; Increased time required;Verbal cues; Commode   Additional Comments Performed functional transfers with RW  Functional Mobility   Functional Mobility 3  Moderate assistance   Additional Comments x2; 2-3 steps to commode & recliner chair   Additional items Rolling walker   Balance   Static Sitting Fair +   Dynamic Sitting Fair -   Static Standing Poor +   Dynamic Standing Poor   Ambulatory Poor   Activity Tolerance   Activity Tolerance Patient limited by fatigue; Other (Comment)  (Impaired cognition)   Medical Staff Made Aware PT [place]   Nurse Made Aware KALYN Chinchilla confirmed pt appropriate for therapy and made aware of session outcomes   RUE Assessment   RUE Assessment X  (Strength deficits noted with functional assessment/mobility)   LUE Assessment   LUE Assessment X  (Strength deficits noted with functional assessment/mobility)   Hand Function   Gross Motor Coordination Impaired   Sensation   Light Touch No apparent deficits  (BUEs)   Vision - Complex Assessment   Ocular Range of Motion WFL   Cognition   Overall Cognitive Status Impaired   Arousal/Participation Cooperative   Attention Attends with cues to redirect   Orientation Level Disoriented to time;Disoriented to place; Disoriented to situation  (Oriented to name only)   Memory Decreased short term memory;Decreased recall of recent events;Decreased recall of precautions   Following Commands Follows one step commands with increased time or repetition   Comments Patient agreeable to OT evaluation   Assessment   Limitation Decreased ADL status; Decreased UE strength;Decreased cognition;Decreased endurance;Decreased self-care trans;Decreased high-level ADLs   Prognosis Good   Assessment Patient is a 79 y o  male seen for OT evaluation s/p admit to [address] on 2/9/2021 w/Sepsis [facility])  Commorbidities affecting patient's functional performance at time of assessment include: UTI, moderate protein-calorie malnutrition, atrial fibrillation, history of C-diff infection, SILVERIO superimposed on CKD, elevated d-dimer, generalized weakness, essential HTN, and type 2 DM with hyperglycemia with long-term current use of insulin   Patient  has a past medical history of Acute pancreatitis without infection or necrosis (9/26/2019), Age-related nuclear cataract, right (11/5/2020), Anemia of chronic renal failure, BPH (benign prostatic hyperplasia), CKD (chronic kidney disease) stage 5, GFR less than 15 ml/min (HCC), Coronary artery disease, COVID-19, DJD (degenerative joint disease), Essential hypertension, Gait disturbance, Generalized weakness, GERD (gastroesophageal reflux disease), Gout, History of transfusion, Hydronephrosis, Hyperlipidemia, Hypertension, 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 ([facility] [address] ), Pressure injury of skin, Proteinuria, Thrombophlebitis migrans, and Type 2 diabetes mellitus  Orders placed for OT evaluation and treatment  Performed at least two patient identifiers during session including name and wristband  Prior to admission, patient was living with his spouse in a one-story home with a ramped entrance  At baseline, patient requires assistance with both ADLs and IADLs per chart review  Home setup and PLOF obtained via chart review secondary to pt being poor historian at time of evaluation; CM to follow up  Personal factors affecting patient at time of initial evaluation include: limited insight into deficits, difficulty performing ADLs, difficulty performing IADLs and health management  Upon evaluation, patient requires minimal  and moderate assist for UB ADLs, maximal assist for LB ADLs, transfers and functional ambulation in room and bathroom with moderate assist x2, with the use of Rolling Walker  Patient is oriented to name  Occupational performance is affected by the following deficits: orientation, decreased muscle strength, impaired gross motor coordination, dynamic sit/ stand balance deficit with poor standing tolerance time for self care and functional mobility, decreased activity tolerance, impaired memory, impaired problem solving, decreased safety awareness and postural control and postural alignment deficit, requiring external assistance to complete transitional movements, decreased endurance, and decreased functional mobility  Therapist completed  extensive additional review of medical records and additional review of physical, cognitive or psychosocial history, clinical examination identifying 5 or more performance deficits, clinical decision making of a high complexity , consistent with a high complexity level evaluation   Patient to benefit from continued Occupational Therapy treatment while in the hospital to address deficits as defined above and maximize level of functional independence with ADLs and functional mobility  Occupational Performance areas to address include: grooming , bathing/ shower, dressing, toilet hygiene, transfer to all surfaces, functional mobility, community mobility, emergency response, health maintenance, IADLs: safety procedures, Leisure Participation and Social participation  From OT standpoint, recommendation at time of d/c would be post-acute rehabilitation services  Goals   Patient Goals to return home and speak with his wife   Plan   Treatment Interventions ADL retraining;Functional transfer training;UE strengthening/ROM; Endurance training;Cognitive reorientation;Patient/family training;Equipment evaluation/education; Compensatory technique education; Energy conservation; Activityengagement   Goal Expiration Date 02/24/21   OT Treatment Day 0   OT Frequency 3-5x/wk   Recommendation   OT Discharge Recommendation Post-Acute Rehabilitation Services   AM-PAC Daily Activity Inpatient   Lower Body Dressing 2   Bathing 2   Toileting 2   Upper Body Dressing 3   Grooming 3   Eating 4   Daily Activity Raw Score 16   Daily Activity Standardized Score (Calc for Raw Score >=11) 35 96   AM-PAC Applied Cognition Inpatient   Following a Speech/Presentation 3   Understanding Ordinary Conversation 3   Taking Medications 1   Remembering Where Things Are Placed or Put Away 1   Remembering List of 4-5 Errands 1   Taking Care of Complicated Tasks 1   Applied Cognition Raw Score 10   Applied Cognition Standardized Score 24 98   Barthel Index   Feeding 10   Bathing 0   Grooming Score 0   Dressing Score 5   Bladder Score 0   Bowels Score 10   Toilet Use Score 5   Transfers (Bed/Chair) Score 5   Mobility (Level Surface) Score 0   Stairs Score 0   Barthel Index Score 35   Modified [place] Scale   Modified [place] Scale 4     Occupational Therapy Goals to be completed in 7-14 Days:    1- Patient will increase OOB/ sitting tolerance to 2-4 hours per day for increased participation in self care and leisure tasks with no s/s of exertion  2- Patient will increase standing tolerance time to 5 minutes with unilateral UE support to complete sink level ADLs@ min assist level  3- Patient will follow 100% simple one step directives without verbal cues  4- Patient will increase sitting tolerance at edge of bed to 15 minutes to complete UB ADLs @ min assist level  5- Patient will transfer bed to Chair / toilet with min assist with AD as indicated  6- Patient will complete UB ADLs with set up assist with use of compensatory/adaptive techniques as indicated  7- Patient will complete LB ADLs with min assist with the use of adaptive equipment  8- Patient will complete toileting hygiene with mod assist/ supervision for thoroughness  5- Patient/ Family will demonstrate competency with UE Home Exercise Program      10- Pt will attend to continued cognitive assessment 100% of the time in order to provide most appropriate recommendations for d/c plans  11- Pt will improve functional activity tolerance while seated EOB to 15+ minutes in order to increase safety and independence during functional transfers and ADL tasks      Richie Mask, OTR/L

## 2021-02-10 NOTE — PLAN OF CARE
Problem: OCCUPATIONAL THERAPY ADULT  Goal: Performs self-care activities at highest level of function for planned discharge setting  See evaluation for individualized goals  Description: Treatment Interventions: ADL retraining, Functional transfer training, UE strengthening/ROM, Endurance training, Cognitive reorientation, Patient/family training, Equipment evaluation/education, Compensatory technique education, Energy conservation, Activityengagement          See flowsheet documentation for full assessment, interventions and recommendations  Note: Limitation: Decreased ADL status, Decreased UE strength, Decreased cognition, Decreased endurance, Decreased self-care trans, Decreased high-level ADLs  Prognosis: Good  Assessment: Patient is a 79 y o  male seen for OT evaluation s/p admit to [address] [facility] on 2/9/2021 w/Sepsis [facility])  Commorbidities affecting patient's functional performance at time of assessment include: UTI, moderate protein-calorie malnutrition, atrial fibrillation, history of C-diff infection, SILVERIO superimposed on CKD, elevated d-dimer, generalized weakness, essential HTN, and type 2 DM with hyperglycemia with long-term current use of insulin  Patient  has a past medical history of Acute pancreatitis without infection or necrosis (9/26/2019), Age-related nuclear cataract, right (11/5/2020), Anemia of chronic renal failure, BPH (benign prostatic hyperplasia), CKD (chronic kidney disease) stage 5, GFR less than 15 ml/min (HCC), Coronary artery disease, COVID-19, DJD (degenerative joint disease), Essential hypertension, Gait disturbance, Generalized weakness, GERD (gastroesophageal reflux disease), Gout, History of transfusion, Hydronephrosis, Hyperlipidemia, Hypertension, 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 ([facility] [address] ), Pressure injury of skin, Proteinuria, Thrombophlebitis migrans, and Type 2 diabetes mellitus  Orders placed for OT evaluation and treatment   Performed at least two patient identifiers during session including name and wristband  Prior to admission, patient was living with his spouse in a one-story home with a ramped entrance  At baseline, patient requires assistance with both ADLs and IADLs per chart review  Home setup and PLOF obtained via chart review secondary to pt being poor historian at time of evaluation; CM to follow up  Personal factors affecting patient at time of initial evaluation include: limited insight into deficits, difficulty performing ADLs, difficulty performing IADLs and health management  Upon evaluation, patient requires minimal  and moderate assist for UB ADLs, maximal assist for LB ADLs, transfers and functional ambulation in room and bathroom with moderate assist x2, with the use of Rolling Walker  Patient is oriented to name,  Occupational performance is affected by the following deficits: orientation, decreased muscle strength, impaired gross motor coordination, dynamic sit/ stand balance deficit with poor standing tolerance time for self care and functional mobility, decreased activity tolerance, impaired memory, impaired problem solving, decreased safety awareness and postural control and postural alignment deficit, requiring external assistance to complete transitional movements, decreased endurance, and decreased functional mobility  Therapist completed  extensive additional review of medical records and additional review of physical, cognitive or psychosocial history, clinical examination identifying 5 or more performance deficits, clinical decision making of a high complexity , consistent with a high complexity level evaluation  Patient to benefit from continued Occupational Therapy treatment while in the hospital to address deficits as defined above and maximize level of functional independence with ADLs and functional mobility   Occupational Performance areas to address include: grooming , bathing/ shower, dressing, toilet hygiene, transfer to all surfaces, functional mobility, community mobility, emergency response, health maintenance, IADLs: safety procedures, Leisure Participation and Social participation  From OT standpoint, recommendation at time of d/c would be post-acute rehabilitation services         OT Discharge Recommendation: Post-Acute Rehabilitation Services

## 2021-02-10 NOTE — PLAN OF CARE
Problem: PHYSICAL THERAPY ADULT  Goal: Performs mobility at highest level of function for planned discharge setting  See evaluation for individualized goals  Description: Treatment/Interventions: Functional transfer training, LE strengthening/ROM, Therapeutic exercise, Endurance training, Cognitive reorientation, Patient/family training, Bed mobility, Gait training, Spoke to nursing, OT          See flowsheet documentation for full assessment, interventions and recommendations  Note: Prognosis: Good  Problem List: Decreased strength, Decreased endurance, Impaired balance, Decreased mobility, Decreased cognition, Decreased skin integrity  Assessment: Pt is 79year old male seen for PT evaluation s/p admit to [address] [place] [address] on 2/9/2021 with Sepsis ([facility] [address] )  PT consulted to assess pt's functional mobility and d/c needs  Order placed for PT eval and tx, with activity as tolerated order  Comorbidities affecting pt's physical performance at time of assessment include type 2 DM, essential hypertension, 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, urinary catheter in place, generalized weakness, elevated d-dimer, SILVERIO superimposed on CKD, history of C-diff, atrial fibrillation, and UTI due to urinary indwelling catheter  PTA, pt was requiring assist for mobility  Personal factors affecting pt at time of IE include: ambulating with assistive device, inability to ambulate household distances, inability to navigate community distances, inability to navigate level surfaces without external assistance, unable to perform dynamic tasks in community, decreased cognition, limited home support, positive fall history, inability to perform IADLs, and inability to perform ADLs   Please find objective findings from PT assessment regarding body systems outlined above with impairments and limitations including weakness, impaired balance, decreased endurance, gait deviations, decreased activity tolerance, decreased functional mobility tolerance, fall risk, and decreased cognition  The following objective measures performed on IE also reveal limitations: Barthel Index: 35/100 and Modified [place]: 4 (moderate/severe disability)  Pt's clinical presentation is currently unstable/unpredictable seen in pt's presentation of need for ongoing medical management/monitoring, pt is a fall risk, and pt requires cues/assist for safety with functional mobility  Pt to benefit from continued PT tx to address deficits as defined above and maximize level of functional independent mobility and consistency  From PT/mobility standpoint, recommendation at time of d/c would be STR pending progress in order to facilitate return to PLOF  Barriers to Discharge: Decreased caregiver support     PT Discharge Recommendation: Post-Acute Rehabilitation Services     PT - OK to Discharge: Yes(when medically cleared, if to STR)    See flowsheet documentation for full assessment

## 2021-02-10 NOTE — UTILIZATION REVIEW
Initial Clinical Review    Admission: Date/Time/Statement:   Admission Orders (From admission, onward)     Ordered        02/09/21 1842  Inpatient Admission  Once                   Orders Placed This Encounter   Procedures    Inpatient Admission     Standing Status:   Standing     Number of Occurrences:   1     Order Specific Question:   Level of Care     Answer:   Med Surg [16]     Order Specific Question:   Estimated length of stay     Answer:   More than 2 Midnights     Order Specific Question:   Certification     Answer:   I certify that inpatient services are medically necessary for this patient for a duration of greater than two midnights  See H&P and MD Progress Notes for additional information about the patient's course of treatment  ED Arrival Information     Expected Arrival Acuity Means of Arrival Escorted By Service Admission Type    - 2/9/2021 16:18 Urgent Ambulance [address] Urgent    Arrival Complaint    weakness        Chief Complaint   Patient presents with    Syncope     reports d/c on 2/1 from [facility] [address] [place] [address] for covid  reports d/c home with VNA/PT/OT referrals  per ems, [facility]NA just visisted pt for first time today and tried to get pt OOB to w/c for the first time since d/c home  reports pt had syncopal episode into w/c, recovered shortly after  denies head strike   Weakness - Generalized     pt with generalized weakness, difficulty lifting extremeties  Assessment/Plan: 79year old male to the ED from home via EMS with complaints of fatigue, syncopal episode  Admitted from 1/24/21-1/31 for pneumonia due to covid and ARF  HE was discharged home, after declining the recommended inpatient rehab  Found by home health so weak, when attempted to go to chair, he ad a syncopal episode  Admitted to inpatient for afib, sepsis, michelle  He arrives appearing ill, lactic acid elevated  D-dimer, wbcs elevated  Blood cultures pending  CXR appears neg for acute abnormalities    Sepsis likely due to chronic indwelling garcia  Culture pending  IV abx started in the ED  IV fluids initiated  Afib appears to be a new diagnosis  Noted on 1/28 EKG  Cardiology consult  Monitor tele  Creat 6 08 on arrival  Continue with gentle iv hydration  Repeat bmp    2/10 UPdate: Urinary tract infection, acute otitis likely multi-drug resistant, Johnson antibiotics to meropenem, discontinue Zosyn  Per PT/oT eval, recommend STR  Continue IV abx  Continue gentle iv hydration  2/10 Cardiology consult: New onset afib  Add metoprolol  ED Triage Vitals   Temperature Pulse Respirations Blood Pressure SpO2   02/09/21 1639 02/09/21 1629 02/09/21 1629 02/09/21 1629 02/09/21 1629   97 5 °F (36 4 °C) 88 18 119/63 99 %      Temp Source Heart Rate Source Patient Position - Orthostatic VS BP Location FiO2 (%)   02/09/21 1639 -- -- -- --   Oral          Pain Score       02/09/21 1629       6          Wt Readings from Last 1 Encounters:   02/09/21 67 6 kg (149 lb 0 5 oz)     Additional Vital Signs: Vital Signs (last 2 days)    Date/Time  Temp  Pulse  Resp  BP  MAP (mmHg)  SpO2   02/10/21 07:30:18  98 2 °F (36 8 °C)  --  --  160/79  106  --   02/10/21 05:42:03  98 5 °F (36 9 °C)  --  16  169/120Abnormal   136  --   02/10/21 03:03:24  98 2 °F (36 8 °C)  --  16  149/72  98  --   02/09/21 22:01:59  97 9 °F (36 6 °C)  84  18  145/78  100  97 %   02/09/21 1930  --  80  21  145/68  98  99 %   02/09/21 1900  --  82  20  144/74  104  100 %   02/09/21 1639  97 5 °F (36 4 °C)  --  --  --  --  --   02/09/21 1629  --  88  18  119/63         Pertinent Labs/Diagnostic Test Results:   2/9 CXR: No acute cardiopulmonary disease    2/9 EKG: Atrial fibrillation  Left axis deviation  Minimal voltage criteria for LVH, may be normal variant  Anterolateral infarct , age undetermined  Abnormal ECG         Results from last 7 days   Lab Units 02/10/21  0521 02/09/21  1658   WBC Thousand/uL 6 69 14 60*   HEMOGLOBIN g/dL 7 9* 10 1*   HEMATOCRIT % 24 1* 30 6*   PLATELETS Thousands/uL 131* 216   NEUTROS ABS Thousands/µL 4 16 11 64*         Results from last 7 days   Lab Units 02/10/21  0521 02/09/21  1658   SODIUM mmol/L 144 136   POTASSIUM mmol/L 3 6 3 8   CHLORIDE mmol/L 110* 102   CO2 mmol/L 21 21   ANION GAP mmol/L 13 13   BUN mg/dL 44* 49*   CREATININE mg/dL 5 54* 6 08*   EGFR ml/min/1 73sq m 9 8   CALCIUM mg/dL 7 9* 8 4     Results from last 7 days   Lab Units 02/09/21  1658   AST U/L 40   ALT U/L 77   ALK PHOS U/L 403*   TOTAL PROTEIN g/dL 6 6   ALBUMIN g/dL 2 6*   TOTAL BILIRUBIN mg/dL 0 60     Results from last 7 days   Lab Units 02/10/21  0825   POC GLUCOSE mg/dl 109     Results from last 7 days   Lab Units 02/10/21  0521 02/09/21  1658   GLUCOSE RANDOM mg/dL 128 294*             BETA-HYDROXYBUTYRATE   Date Value Ref Range Status   02/05/2019 0 11 0 02 - 0 27 mmol/L Final          Results from last 7 days   Lab Units 02/09/21  1658   CK TOTAL U/L 50     Results from last 7 days   Lab Units 02/09/21  1658   TROPONIN I ng/mL <0 02     Results from last 7 days   Lab Units 02/09/21  1658   D-DIMER QUANTITATIVE ug/ml FEU 2 71*           Results from last 7 days   Lab Units 02/10/21  0154 02/09/21  2055   PROCALCITONIN ng/ml 0 24 0 22     Results from last 7 days   Lab Units 02/09/21  2056 02/09/21  1712   LACTIC ACID mmol/L 1 1 3 9*       Results from last 7 days   Lab Units 02/09/21  1737   CLARITY UA  Cloudy   COLOR UA  Yellow   SPEC GRAV UA  1 010   PH UA  8 0   GLUCOSE UA mg/dl Negative   KETONES UA mg/dl Negative   BLOOD UA  Trace*   PROTEIN UA mg/dl 300 (3+)*   NITRITE UA  Negative   BILIRUBIN UA  Negative   UROBILINOGEN UA E U /dl 0 2   LEUKOCYTES UA  Large*   WBC UA /hpf Field obscured, unable to enumerate*   RBC UA /hpf 0-1   BACTERIA UA /hpf Innumerable*   EPITHELIAL CELLS WET PREP /hpf None Seen   MUCUS THREADS  Innumerable*         Results from last 7 days   Lab Units 02/09/21  1721 02/09/21  1713   BLOOD CULTURE  Received in Microbiology Lab  Culture in Progress  Received in Microbiology Lab  Culture in Progress       ED Treatment:   Medication Administration from 02/09/2021 1618 to 02/09/2021 2157       Date/Time Order Dose Route Action     02/09/2021 1701 sodium chloride 0 9 % bolus 1,000 mL 1,000 mL Intravenous New Bag     02/09/2021 1832 ceftriaxone (ROCEPHIN) 1 g/50 mL in dextrose IVPB 1,000 mg Intravenous New Bag     02/09/2021 1956 sodium chloride 0 9 % bolus 1,000 mL 1,000 mL Intravenous New Bag     02/09/2021 1832 acetaminophen (TYLENOL) tablet 975 mg 975 mg Oral Given     02/09/2021 2057 piperacillin-tazobactam (ZOSYN) 2 25 g in sodium chloride 0 9 % 50 mL IVPB 2 25 g Intravenous New Bag     02/09/2021 2057 vancomycin (VANCOCIN) oral solution 125 mg 125 mg Oral Given        Past Medical History:   Diagnosis Date    Acute pancreatitis without infection or necrosis 9/26/2019    Age-related nuclear cataract, right 11/5/2020    Anemia of chronic renal failure     BPH (benign prostatic hyperplasia)     CKD (chronic kidney disease) stage 5, GFR less than 15 ml/min (HCC)     Coronary artery disease     COVID-19     DJD (degenerative joint disease)     Essential hypertension     Gait disturbance     Generalized weakness     GERD (gastroesophageal reflux disease)     Gout     History of transfusion     Hydronephrosis     Hyperlipidemia     Hypertension     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)     Pressure injury of skin     Proteinuria     Thrombophlebitis migrans     Type 2 diabetes mellitus        Admitting Diagnosis: Atrial fibrillation (HCC) [I48 91]  Dehydration [E86 0]  Fatigue [R53 83]  Weakness [R53 1]  [facility] [N17 9]  Age/Sex: 79 y o  male  Admission Orders:  Tele   Activity as tolerated  Procal  Urine culture    Scheduled Medications:  aspirin, 81 mg, Oral, Daily  heparin (porcine), 5,000 Units, Subcutaneous, Q8H [address]  insulin lispro, 1-5 Units, Subcutaneous, 4x Daily (AC & HS)  NIFEdipine ER, 60 mg, Oral, Daily  pantoprazole, 40 mg, Oral, Daily  piperacillin-tazobactam, 2 25 g, Intravenous, Q8H  sodium bicarbonate, 325 mg, Oral, BID  tamsulosin, 0 4 mg, Oral, Daily With Dinner  vancomycin, 125 mg, Oral, Q12H [address]      Continuous IV Infusions:     PRN Meds:  acetaminophen, 650 mg, Oral, Q6H PRN        IP CONSULT TO CASE MANAGEMENT  IP CONSULT TO CARDIOLOGY  IP CONSULT TO NUTRITION SERVICES    Network Utilization Review Department  ATTENTION: Please call with any questions or concerns to [phone] and carefully listen to the prompts so that you are directed to the right person  All voicemails are confidential   Jeanine Lang all requests for admission clinical reviews, approved or denied determinations and any other requests to dedicated fax number below belonging to the campus where the patient is receiving treatment   List of dedicated fax numbers for the Facilities:  [address] DENIALS (Administrative/Medical Necessity) [phone]   [address] (Maternity/NICU/Pediatrics) [phone]   [address] Eula Squires 1508 (  Carole Heller "[address]" 103) [address] [place] [address] Terry Munoz 1481 [address] [place]) [address] [place], [place] [address] [phone]

## 2021-02-10 NOTE — WOUND OSTOMY CARE
Progress Note - Wound   Anton Das 79 y o  male MRN: [id number]  Unit/Bed#: -01 Encounter: [id number]      Assessment:  Wound care consulted for assessment of pressure injuries noted on admission  Patient admitted with sepsis related to a UTI  Patient with history of a-fib, c diff, CKD, liver cancer, HTN and DM  Patient seen in bed, alert and oriented x 4  cooperative for the assessment  Max assist of 1 with turning  Haile cath in place  Incontinent of small amount of BM, janneth-care rendered  Foam wedges in use for repositioning  Will recommend nutrition consult  accu-max pump in place to the mattress  B/l heels are intact with blanchable pink colored skin, no tenderness  Scrotum is dry and intact, no redness  1  B/l buttocks with unstageable pressure injuries - POA  Suspect mixed etiology of pressure and moisture  Wounds measured together  True depth of wounds is unknown due to devitalized tissue covering the wound beds  Wounds are round/oval shape, approx: 80% moist thin adhered yellow slough and 20% moist pink colored tissue  Edges fragile and attached slight maceration  Janneth-wounds and remainder of skin to the b/l buttocks and sacrum is dry and intact with blanchable pink and blanchable hyperpigmented skin  -due to incontinence/location of the wounds  Will recommend calazime cream      Educated patient on the importance of frequent offloading of pressure via turning, repositioning and weight-shifting  Verbalized understanding of plan of care  No induration, fluctuance, odor, warmth, redness, or purulence noted to the above noted wounds  New dressings applied  Patient tolerated well  Primary nurse aware of plan of care  See flow sheets for more detailed assessment findings  Will follow along  Plan:    1  Cleanse b/l buttocks and sacrum with soap and water, pat dry, and apply calazime cream TID and PRN   2  Apply skin nourishing cream the entire skin daily for moisture  3   Turn and reposition patient every  2 hours   4  Elevate heels off of bed with pillows to offload pressure   5  Apply EHOB waffle cushion to chair when OOB, if able  6  Allevyn foam to heels, meliza w/P, peel foam check skin integrity q-shift  Change q3d   7  Continue with accu-max pump noted to bed   8  Nutrition consult   9  Follow-up with the [facility] wound care center as an outpatient - please call [phone] for an appointment  10  Wound care will follow along with patient weekly, please call with questions and concerns      Objective:    Vitals: Blood pressure 160/79, pulse 84, temperature 98 2 °F (36 8 °C), resp  rate 16, height 5' 8" (1 727 m), weight 67 6 kg (149 lb 0 5 oz), SpO2 97 %  ,Body mass index is 22 66 kg/m²  Wound 02/09/21 Pressure Injury Buttocks Left;Right (Active)   Wound Image   02/10/21 0747   Wound Description Pink;Yellow 02/10/21 0747   Pressure Injury Stage U 02/10/21 0747   Avis-wound Assessment Intact;Fragile; Hyperpigmented;Pink;Maceration 02/10/21 0747   Wound Length (cm) 2 5 cm 02/10/21 0747   Wound Width (cm) 4 cm 02/10/21 0747   Wound Depth (cm) 0 1 cm 02/10/21 0747   Wound Surface Area (cm^2) 10 cm^2 02/10/21 0747   Wound Volume (cm^3) 1 cm^3 02/10/21 0747   Calculated Wound Volume (cm^3) 1 cm^3 02/10/21 0747   Tunneling 0 cm 02/10/21 0747   Tunneling in depth located at 0 02/10/21 0747   Undermining 0 02/10/21 0747   Undermining is depth extending from 0 02/10/21 0747   Wound Site Closure None 02/10/21 0747   Drainage Amount Scant 02/10/21 0747   Drainage Description Serosanguineous 02/10/21 0747   Non-staged Wound Description Partial thickness 02/10/21 0747   Treatments Cleansed;Site care;Irrigation with NSS;Elevated 02/10/21 0747   Dressing Protective barrier 02/10/21 0747   Wound packed? No 02/10/21 0747   Packing- # removed 0 02/10/21 0747   Packing- # inserted 0 02/10/21 0747   Patient Tolerance Tolerated well 02/10/21 0747     Recommendations written as orders   AVS updated    Henry Hoyos RN BSN CWON

## 2021-02-10 NOTE — PHYSICAL THERAPY NOTE
Physical Therapy Evaluation     Patient's Name: Tri Cervantes    Admitting Diagnosis  Atrial fibrillation ([place] [address] ) [I48 91]  Dehydration [E86 0]  Fatigue [R53 83]  Weakness [R53 1]  SILVERIO (acute kidney injury) ([place] [address] ) [N17 9]    Problem List  Patient Active Problem List   Diagnosis    Type 2 diabetes mellitus with hyperglycemia, with long-term current use of insulin ([place] [address] )    Essential hypertension    Mixed hyperlipidemia    Iron deficiency anemia secondary to inadequate dietary iron intake    Syncope and collapse    Liver mass    Neuroendocrine tumor    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 ([place] [address] )    Pressure injury of right heel, unstageable ([place] [address] )    Atherosclerosis of artery of extremity with ulceration ([place] [address] )    Carcinoid syndrome (HCC)    Urinary catheter in place    Malignant neuroendocrine neoplasm ([place] [address] )    Abdominal pain    UTI due to extended-spectrum beta lactamase (ESBL) producing Escherichia coli    Hypomagnesemia    Normocytic anemia    Goals of care, counseling/discussion    Hyperparathyroid bone disease ([place] [address] )    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    Sepsis ([place] [address] )    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    Low TSH level    Severe protein-calorie malnutrition (HCC)    Chronic Diastolic congestive heart failure    Premature atrial complexes    Bradycardia    Generalized weakness    Recurrent UTI    Gastroesophageal reflux disease without esophagitis    Ureterolithiasis    Obstructive uropathy    Gait difficulty    CAD (coronary artery disease)    Azotemia    Acidosis    Hypertensive kidney disease with stage 5 chronic kidney disease, not on chronic dialysis ([facility])    Edema    Chest pain    Chest pressure    Urinary retention    Elevated d-dimer    Esophageal dysphagia    Dizziness    Anemia due to chronic kidney disease    CKD (chronic kidney disease) stage 5, GFR less than 15 ml/min ([facility])    Vasovagal episode    SIRS (systemic inflammatory response syndrome) ([place] [address] )    Acute kidney injury superimposed on CKD ([facility] [address] )    Pneumonia due to COVID-19 virus    History of Clostridioides difficile infection    Atrial fibrillation (HCC)    Moderate protein-calorie malnutrition (HCC)    UTI (urinary tract infection) due to urinary indwelling catheter [facility])     Past Medical History  Past Medical History:   Diagnosis Date    Acute pancreatitis without infection or necrosis 9/26/2019    Age-related nuclear cataract, right 11/5/2020    Anemia of chronic renal failure     BPH (benign prostatic hyperplasia)     CKD (chronic kidney disease) stage 5, GFR less than 15 ml/min (HCC)     Coronary artery disease     COVID-19     DJD (degenerative joint disease)     Essential hypertension     Gait disturbance     Generalized weakness     GERD (gastroesophageal reflux disease)     Gout     History of transfusion     Hydronephrosis     Hyperlipidemia     Hypertension     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)     Pressure injury of skin     Proteinuria     Thrombophlebitis migrans     Type 2 diabetes mellitus      Past Surgical History  Past Surgical History:   Procedure Laterality Date    CATARACT EXTRACTION Right 11/05/2020    CATARACT EXTRACTION W/ INTRAOCULAR LENS IMPLANT Right 11/5/2020    Procedure: EYE OD CATARACT EXTRACTION WITH IOL INSERTION;  Surgeon: Gissel Sanchez MD;  Location[facility] MAIN OR;  Service: Ophthalmology    CHOLECYSTECTOMY      CHOLECYSTECTOMY LAPAROSCOPIC N/A 2/7/2020    Procedure: CHOLECYSTECTOMY LAPAROSCOPIC;  Surgeon:  Keller Dandy, MD;  Location: [facility] MAIN OR;  Service: General    CORONARY ANGIOPLASTY WITH STENT PLACEMENT      FL RETROGRADE PYELOGRAM  5/21/2020    IR BIOPSY LIVER MASS  1/24/2019    IR CHOLECYSTOSTOMY TUBE PLACEMENT  9/6/2019    IR NON-TUNNELED CENTRAL LINE PLACEMENT  1/28/2021    GA CYSTO/URETERO W/LITHOTRIPSY &INDWELL STENT INSRT Left 5/21/2020    Procedure: CYSTOSCOPY URETEROSCOPY WITH LITHOTRIPSY HOLMIUM LASER, RETROGRADE PYELOGRAM AND INSERTION STENT URETERAL;  Surgeon: Vianney Ge MD;  Location: [facility] MAIN OR;  Service: Urology    ND CYSTOURETHROSCOPY,URETER CATHETER Left 4/21/2020    Procedure: CYSTOSCOPY WITH INSERTION STENT URETERAL;  Surgeon: Bogdan Ortez MD;  Location: [facility] Main OR;  Service: Urology      02/10/21 1003   PT Last Visit   PT Visit Date 02/10/21   Pain Assessment   Pain Assessment Tool 0-10   Pain Score No Pain   Home Living   Type of [address] One level;Performs ADLs on one level; Able to live on main level with bedroom/bathroom; Ramped entrance   wizboo Grab bars in shower; Shower chair;Commode   [address] [facility]; Wheelchair-manual   Prior Function   Level of [place] Needs assistance with ADLs and functional mobility   Lives With Spouse   Receives Help From Family   ADL Assistance Needs assistance   IADLs Needs assistance   Falls in the last 6 months 1 to 4   Vocational Retired   Comments Pt is a poor historian; home set up and PLOF obtained from chart review; further clarification is required   Restrictions/Precautions   Weight Bearing Precautions Per Order No   Other Precautions Contact/isolation;Cognitive; Chair Alarm; Bed Alarm;Telemetry; Fall Risk  (+covid 1/24/21)   General   Family/Caregiver Present No   Cognition   Overall Cognitive Status Impaired   Arousal/Participation Alert   Attention Attends with cues to redirect   Orientation Level Oriented to person;Disoriented to place; Disoriented to time;Disoriented to situation   Memory Decreased short term memory;Decreased recall of recent events;Decreased recall of precautions;Decreased recall of biographical information   Following Commands Follows one step commands with increased time or repetition   Comments Pt agreeable to PT    RUE Assessment   RUE Assessment   (defer to OT assessment)   LUE Assessment LUE Assessment   (defer to OT assessment)   RLE Assessment   RLE Assessment X   Strength RLE   RLE Overall Strength 3/5   LLE Assessment   LLE Assessment X   Strength LLE   LLE Overall Strength 3/5   Light Touch   RLE Light Touch Grossly intact   LLE Light Touch Grossly intact   Bed Mobility   Supine to Sit 3  Moderate assistance   Additional items Assist x 1;HOB elevated; Bedrails; Increased time required;Verbal cues;LE management   Transfers   Sit to Stand 3  Moderate assistance   Additional items Assist x 2; Increased time required;Verbal cues   Stand to Sit 3  Moderate assistance   Additional items Assist x 2;Armrests; Increased time required;Verbal cues   Ambulation/Elevation   Gait pattern Excessively slow; Step to;Short stride; Foward flexed; Shuffling   Gait Assistance 3  Moderate assist   Additional items Assist x 2;Verbal cues   Assistive Device Rolling walker   Distance 5 feet   Stair Management Assistance Not tested   Balance   Static Sitting Fair   Dynamic Sitting Fair -   Static Standing Poor +   Dynamic Standing Poor   Ambulatory Poor   Endurance Deficit   Endurance Deficit Yes   Activity Tolerance   Activity Tolerance Patient limited by fatigue   Medical Staff Made Aware OT Dali Higgins   Nurse Made Aware Discussed case with KALYN Palma; post session pt was left seated in recliner in NAD, all belongings within reach, +chair alarm   Assessment   Prognosis Good   Problem List Decreased strength;Decreased endurance; Impaired balance;Decreased mobility; Decreased cognition;Decreased skin integrity   Assessment Pt is 79year old male seen for PT evaluation s/p admit to [facility] on 2/9/2021 with Sepsis ([facility] [address] )  PT consulted to assess pt's functional mobility and d/c needs  Order placed for PT eval and tx, with activity as tolerated order   Comorbidities affecting pt's physical performance at time of assessment include type 2 DM, essential hypertension, 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, urinary catheter in place, generalized weakness, elevated d-dimer, SILVERIO superimposed on CKD, history of C-diff, atrial fibrillation, and UTI due to urinary indwelling catheter  PTA, pt was requiring assist for mobility  Personal factors affecting pt at time of IE include: ambulating with assistive device, inability to ambulate household distances, inability to navigate community distances, inability to navigate level surfaces without external assistance, unable to perform dynamic tasks in community, decreased cognition, limited home support, positive fall history, inability to perform IADLs, and inability to perform ADLs  Please find objective findings from PT assessment regarding body systems outlined above with impairments and limitations including weakness, impaired balance, decreased endurance, gait deviations, decreased activity tolerance, decreased functional mobility tolerance, fall risk, and decreased cognition  The following objective measures performed on IE also reveal limitations: Barthel Index: 35/100 and Modified [place]: 4 (moderate/severe disability)  Pt's clinical presentation is currently unstable/unpredictable seen in pt's presentation of need for ongoing medical management/monitoring, pt is a fall risk, and pt requires cues/assist for safety with functional mobility  Pt to benefit from continued PT tx to address deficits as defined above and maximize level of functional independent mobility and consistency  From PT/mobility standpoint, recommendation at time of d/c would be STR pending progress in order to facilitate return to PLOF     Barriers to Discharge Decreased caregiver support   Goals   Patient Goals to return home   STG Expiration Date 02/20/21   Short Term Goal #1 In 7-10 days: Increase bilateral LE strength 1/2 grade to facilitate independent mobility, Perform all bed mobility tasks with CG assist to decrease caregiver burden, Perform all transfers with min A of 1 to improve independence, Ambulate > 100 ft  with RW with mod A of 1 w/o LOB and w/ normalized gait pattern 100% of the time and Increase all balance 1/2 grade to decrease risk for falls   Plan   Treatment/Interventions Functional transfer training;LE strengthening/ROM; Therapeutic exercise; Endurance training;Cognitive reorientation;Patient/family training;Bed mobility;Gait training;Spoke to nursing;OT   PT Frequency Other (Comment)  (3-5x/wk)   Recommendation   PT Discharge Recommendation Post-Acute Rehabilitation Services   PT - OK to Discharge Yes  (when medically cleared, if to STR)   AM-PAC Basic Mobility Inpatient   Turning in Bed Without Bedrails 2   Lying on Back to Sitting on Edge of Flat Bed 2   Moving Bed to Chair 1   Standing Up From Chair 1   Walk in Room 1   Climb 3-5 Stairs 1   Basic Mobility Inpatient Raw Score 8   Turning Head Towards Sound 4   Follow Simple Instructions 3   Low Function Basic Mobility Raw Score 15   Low Function Basic Mobility Standardized Score 23 9   Modified [place] Scale   Modified [place] Scale 4   Barthel Index   Feeding 10   Bathing 0   Grooming Score 0   Dressing Score 5   Bladder Score 0   Bowels Score 10   Toilet Use Score 5   Transfers (Bed/Chair) Score 5   Mobility (Level Surface) Score 0   Stairs Score 0   Barthel Index Score 35     Monica Rosenbaum, PT, DPT

## 2021-02-10 NOTE — PLAN OF CARE
Problem: Potential for Falls  Goal: Patient will remain free of falls  Description: INTERVENTIONS:  - Assess patient frequently for physical needs  -  Identify cognitive and physical deficits and behaviors that affect risk of falls    -  [place] fall precautions as indicated by assessment   - Educate patient/family on patient safety including physical limitations  - Instruct patient to call for assistance with activity based on assessment  - Modify environment to reduce risk of injury  - Consider OT/PT consult to assist with strengthening/mobility  Outcome: Not Progressing     Problem: Prexisting or High Potential for Compromised Skin Integrity  Goal: Skin integrity is maintained or improved  Description: INTERVENTIONS:  - Identify patients at risk for skin breakdown  - Assess and monitor skin integrity  - Assess and monitor nutrition and hydration status  - Monitor labs   - Assess for incontinence   - Turn and reposition patient  - Assist with mobility/ambulation  - Relieve pressure over bony prominences  - Avoid friction and shearing  - Provide appropriate hygiene as needed including keeping skin clean and dry  - Evaluate need for skin moisturizer/barrier cream  - Collaborate with interdisciplinary team   - Patient/family teaching  - Consider wound care consult   Outcome: Not Progressing     Problem: PAIN - ADULT  Goal: Verbalizes/displays adequate comfort level or baseline comfort level  Description: Interventions:  - Encourage patient to monitor pain and request assistance  - Assess pain using appropriate pain scale  - Administer analgesics based on type and severity of pain and evaluate response  - Implement non-pharmacological measures as appropriate and evaluate response  - Consider cultural and social influences on pain and pain management  - Notify physician/advanced practitioner if interventions unsuccessful or patient reports new pain  Outcome: Not Progressing     Problem: INFECTION - ADULT  Goal: Absence or prevention of progression during hospitalization  Description: INTERVENTIONS:  - Assess and monitor for signs and symptoms of infection  - Monitor lab/diagnostic results  - Monitor all insertion sites, i e  indwelling lines, tubes, and drains  - Monitor endotracheal if appropriate and nasal secretions for changes in amount and color  - [place] appropriate cooling/warming therapies per order  - Administer medications as ordered  - Instruct and encourage patient and family to use good hand hygiene technique  - Identify and instruct in appropriate isolation precautions for identified infection/condition  Outcome: Not Progressing  Goal: Absence of fever/infection during neutropenic period  Description: INTERVENTIONS:  - Monitor WBC    Outcome: Not Progressing     Problem: SAFETY ADULT  Goal: Patient will remain free of falls  Description: INTERVENTIONS:  - Assess patient frequently for physical needs  -  Identify cognitive and physical deficits and behaviors that affect risk of falls    -  [place] fall precautions as indicated by assessment   - Educate patient/family on patient safety including physical limitations  - Instruct patient to call for assistance with activity based on assessment  - Modify environment to reduce risk of injury  - Consider OT/PT consult to assist with strengthening/mobility  Outcome: Not Progressing  Goal: Maintain or return to baseline ADL function  Description: INTERVENTIONS:  -  Assess patient's ability to carry out ADLs; assess patient's baseline for ADL function and identify physical deficits which impact ability to perform ADLs (bathing, care of mouth/teeth, toileting, grooming, dressing, etc )  - Assess/evaluate cause of self-care deficits   - Assess range of motion  - Assess patient's mobility; develop plan if impaired  - Assess patient's need for assistive devices and provide as appropriate  - Encourage maximum independence but intervene and supervise when necessary  - Involve family in performance of ADLs  - Assess for home care needs following discharge   - Consider OT consult to assist with ADL evaluation and planning for discharge  - Provide patient education as appropriate  Outcome: Not Progressing  Goal: Maintain or return mobility status to optimal level  Description: INTERVENTIONS:  - Assess patient's baseline mobility status (ambulation, transfers, stairs, etc )    - Identify cognitive and physical deficits and behaviors that affect mobility  - Identify mobility aids required to assist with transfers and/or ambulation (gait belt, sit-to-stand, lift, walker, cane, etc )  - [place] fall precautions as indicated by assessment  - Record patient progress and toleration of activity level on Mobility SBAR; progress patient to next Phase/Stage  - Instruct patient to call for assistance with activity based on assessment  - Consider rehabilitation consult to assist with strengthening/weightbearing, etc   Outcome: Not Progressing     Problem: DISCHARGE PLANNING  Goal: Discharge to home or other facility with appropriate resources  Description: INTERVENTIONS:  - Identify barriers to discharge w/patient and caregiver  - Arrange for needed discharge resources and transportation as appropriate  - Identify discharge learning needs (meds, wound care, etc )  - Arrange for interpretive services to assist at discharge as needed  - Refer to Case Management Department for coordinating discharge planning if the patient needs post-hospital services based on physician/advanced practitioner order or complex needs related to functional status, cognitive ability, or social support system  Outcome: Not Progressing     Problem: Knowledge Deficit  Goal: Patient/family/caregiver demonstrates understanding of disease process, treatment plan, medications, and discharge instructions  Description: Complete learning assessment and assess knowledge base    Interventions:  - Provide teaching at level of understanding  - Provide teaching via preferred learning methods  Outcome: Not Progressing

## 2021-02-10 NOTE — DISCHARGE INSTR - OTHER ORDERS
1  Cleanse b/l buttocks and sacrum with soap and water, pat dry, and apply calazime cream TID and PRN   2  Apply skin nourishing cream the entire skin daily for moisture  3  Turn and reposition patient every  2 hours   4  Elevate heels off of bed with pillows to offload pressure   5  Apply EHOB waffle cushion to chair when OOB, if able  6  Allevyn foam to heels, meliza w/P, peel foam check skin integrity q-shift  Change q3d   7  Follow-up with the [facility] wound care center as an outpatient - please call [phone] for an appointment

## 2021-02-11 PROBLEM — F32.A DEPRESSION: Status: ACTIVE | Noted: 2021-01-01

## 2021-02-11 PROBLEM — A41.9 SEPSIS (HCC): Status: RESOLVED | Noted: 2020-03-11 | Resolved: 2021-01-01

## 2021-02-11 PROBLEM — D64.9 ANEMIA: Status: ACTIVE | Noted: 2021-01-01

## 2021-02-11 NOTE — PROGRESS NOTES
Progress Note - Enma Heredia 1940, 79 y o  male MRN: [id number]    Unit/Bed#: -01 Encounter: [id number]    Primary Care Provider: RIKA Banks   Date and time admitted to hospital: 2/9/2021  4:18 PM        Depression  Assessment & Plan  Patient looks depressed and crying during our encounter  Denies to answer further questions regarding to it  He is oriented x 2 (place and person)  Psych consult  Anemia  Assessment & Plan  Hb 7 8  Multifactorial   Anemia of chronic kidney disease is one of the components  Continue to monitor  Will consider to transfuse if hemoglobin less than 7    UTI (urinary tract infection) due to urinary indwelling catheter [facility])  Assessment & Plan  UTI due to chronic indwelling Garcia catheter, as evidenced by UTI in a patient with a chronic indwelling garcia catheter, requiring IV antibiotics and possible change of catheter while inpatient  Continue meropenem   Urine culture growing Klebsiella oxytoca  Blood culture negative x2 so far  Will deescalate antibiotics according to culture and sensitivity results       Moderate protein-calorie malnutrition ([address] )  Assessment & Plan  Malnutrition Findings:   Adult Malnutrition type: Chronic illness  Adult Degree of Malnutrition: Malnutrition of moderate degree    BMI Findings: Body mass index is 22 66 kg/m²  Nutrition consult, appreciate recommendation  Atrial fibrillation [facility])  Assessment & Plan  Fairly new onset atrial fibrillation  [id number] score 4 points  However he is not a candidate for anticoagulation given in the 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, fall risk, anemia, dementia  Rate has been controlled with metoprolol  Cardiology consult, appreciate recommendation  History of Clostridioides difficile infection  Assessment & Plan  Denies any fever, chills, nausea, vomiting and diarrhea  Currently on prophylaxis p o   Vanco    Acute kidney injury superimposed on CKD [facility])  Assessment & Plan  Lab Results   Component Value Date    EGFR 9 02/11/2021    EGFR 9 02/10/2021    EGFR 8 02/09/2021    CREATININE 5 30 (H) 02/11/2021    CREATININE 5 54 (H) 02/10/2021    CREATININE 6 08 (H) 02/09/2021     Baseline creatinine appears to be around 4 5  CKD stage 5 not on dialysis  nephro consult, recommended to give IV hydration in light of intravascular volume depletion  Avoid nephro toxins  Elevated d-dimer  Assessment & Plan  D-dimer 2 71  Troponin negative  Denies any chest pain, shortness of breath, palpitation, leg swelling  VTE unlikely according to age-adjusted d dimer  Patient does have a history of Covid recently on 1/24/2021  Continue to monitor vital signs  Generalized weakness  Assessment & Plan  Patient was recommended for STR on prior hospitalization  However, patient was discharged with Home health as his choice of SNF did not have available bed  PT/OT consult  CM consult  Discussed with the patient and significant other and they are not agreeable to go to rehab  Essential hypertension  Assessment & Plan  BP is reasonably controlled  Continue nifedipine and metoprolol      Type 2 diabetes mellitus with hyperglycemia, with long-term current use of insulin [facility])  Assessment & Plan  Lab Results   Component Value Date    HGBA1C 6 8 (H) 09/21/2020       Recent Labs     02/10/21  1559 02/10/21  2114 02/11/21  0630 02/11/21  1109   POCGLU 160* 160* 121 185*       Blood Sugar Average: Last 72 hrs:  (P) 157   Sugar at goal  Acute check and SSI  Currently on a regular diet as per Nutrition      VTE Pharmacologic Prophylaxis:   Pharmacologic: Heparin  Mechanical VTE Prophylaxis in Place: Yes    Discussions with Specialists or Other Care Team Provider: Yes    Education and Discussions with Family / Patient:  Patient and his significant other  Patient and his significant other requested not to call his family member       Current Length of Stay: 2 day(s)    Current Patient Status: Inpatient     Discharge Plan / Estimated Discharge Date: TBD    Code Status: Level 1 - Full Code      Subjective:   He was seen and examined at the bedside  He is crying during our encounter  He is alert and cooperative with exam  He reports no complaints  Objective:     Vitals:   Temp (24hrs), Av 1 °F (36 7 °C), Min:97 5 °F (36 4 °C), Max:98 6 °F (37 °C)    Temp:  [97 5 °F (36 4 °C)-98 6 °F (37 °C)] 97 9 °F (36 6 °C)  HR:  [73-80] 73  Resp:  [15-17] 17  BP: (136-157)/(68-80) 136/74  SpO2:  [97 %-99 %] 99 %  Body mass index is 22 66 kg/m²  Input and Output Summary (last 24 hours): Intake/Output Summary (Last 24 hours) at 2021 1510  Last data filed at 2021 1357  Gross per 24 hour   Intake 796 25 ml   Output 650 ml   Net 146 25 ml       Physical Exam:     Physical Exam  Vitals signs and nursing note reviewed  Constitutional:       General: He is not in acute distress  Appearance: He is ill-appearing (Chronically)  He is not toxic-appearing or diaphoretic  HENT:      Head: Normocephalic and atraumatic  Eyes:      General: No scleral icterus  Conjunctiva/sclera: Conjunctivae normal    Neck:      Musculoskeletal: Normal range of motion  Cardiovascular:      Rate and Rhythm: Normal rate  Rhythm irregular  Pulses: Normal pulses  Heart sounds: Normal heart sounds  No murmur  No friction rub  Pulmonary:      Effort: Pulmonary effort is normal  No respiratory distress  Breath sounds: Normal breath sounds  No stridor  No wheezing or rhonchi  Abdominal:      General: Abdomen is flat  Bowel sounds are normal  There is no distension  Palpations: Abdomen is soft  Tenderness: There is no abdominal tenderness  There is no guarding  Genitourinary:     Comments: Indwelling urinary catheter in placed  Urine looks clean, straw color  Musculoskeletal: Normal range of motion  General: No swelling or tenderness        Right lower leg: No edema  Left lower leg: No edema  Skin:     General: Skin is warm and dry  Capillary Refill: Capillary refill takes less than 2 seconds  Coloration: Skin is not pale  Findings: No rash  Neurological:      Mental Status: He is alert  He is disoriented  Motor: Weakness ( generalized) present  Comments: AAO x 2  Place and person  Psychiatric:         Mood and Affect: Mood normal               Additional Data:     Labs:    Results from last 7 days   Lab Units 02/11/21  0522   WBC Thousand/uL 6 95   HEMOGLOBIN g/dL 7 8*   HEMATOCRIT % 24 4*   PLATELETS Thousands/uL 131*   NEUTROS PCT % 70   LYMPHS PCT % 18   MONOS PCT % 10   EOS PCT % 1     Results from last 7 days   Lab Units 02/11/21  0522  02/09/21  1658   POTASSIUM mmol/L 3 5   < > 3 8   CHLORIDE mmol/L 110*   < > 102   CO2 mmol/L 23   < > 21   BUN mg/dL 43*   < > 49*   CREATININE mg/dL 5 30*   < > 6 08*   CALCIUM mg/dL 7 7*   < > 8 4   ALK PHOS U/L  --   --  403*   ALT U/L  --   --  77   AST U/L  --   --  40    < > = values in this interval not displayed  * I Have Reviewed All Lab Data Listed Above  * Additional Pertinent Lab Tests Reviewed: Laura 66 Admission Reviewed      Recent Cultures (last 7 days):     Results from last 7 days   Lab Units 02/09/21  1737 02/09/21  1721 02/09/21  1713   BLOOD CULTURE   --  No Growth at 24 hrs  No Growth at 24 hrs     URINE CULTURE  >100,000 cfu/ml Klebsiella oxytoca*  --   --        Last 24 Hours Medication List:   Current Facility-Administered Medications   Medication Dose Route Frequency Provider Last Rate    acetaminophen  650 mg Oral Q6H PRN Ranjith Kimble MD      aspirin  81 mg Oral Daily Best BAUTISTA MD      heparin (porcine)  5,000 Units Subcutaneous Q8H [facility] Best BAUTISTA MD      insulin lispro  1-5 Units Subcutaneous 4x Daily (AC & HS) Lucretia Kilgore PA-C      meropenem  500 mg Intravenous Q12H Dusty Albright  mg (02/11/21 1354)    metoprolol tartrate  12 5 mg Oral Q12H [facility] Sandy Auguste MD      NIFEdipine ER  60 mg Oral Daily Best BAUTISTA MD      pantoprazole  40 mg Oral Daily Best BAUTISTA MD      sodium bicarbonate  325 mg Oral BID Cristy BAUTISTA MD      sodium chloride  125 mL/hr Intravenous Continuous Cecelia Fontenot  mL/hr (02/11/21 7776)    tamsulosin  0 4 mg Oral Daily With Dinner Marti Gaines MD      vancomycin  125 mg Oral Q12H [facility] Marti Gaines MD          Today, Patient Was Seen By: Kristine Bai MD    ** Please Note: This note has been constructed using a voice recognition system   **

## 2021-02-11 NOTE — CONSULTS
NEPHROLOGY CONSULTATION NOTE    Patient: Junior Canas               Sex: male          DOA: 2/9/2021  4:18 PM   Date of Birth: @        Age: @        LOS:  LOS: 2 days     REFERRING PHYSICIAN: Dr Meera Redmond / Carmelita Ayon:  SILVERIO on CKD stage 5    DATE OF CONSULTATION / SERVICE: 2/11/2021    ADMISSION DIAGNOSIS: Sepsis [facility])     Chief Complaint   Patient presents with    Syncope     reports d/c on 2/1 from [facility] for covid  reports d/c home with VNA/PT/OT referrals  per ems, VNA just visisted pt for first time today and tried to get pt OOB to w/c for the first time since d/c home  reports pt had syncopal episode into w/c, recovered shortly after  denies head strike   Weakness - Generalized     pt with generalized weakness, difficulty lifting extremeties  HPI     Junior Canas is a 79year old male with past medical history significant for CAD, diabetes, hypertension, 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, 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, CKD stage 5, and recent hospitalization (1/24/21 - 1/31/24) for COVID-19 pneumonia, who is currently admitted with chief complaint of generalized weakness and a syncopal episode at home and is being treated for suspected sepsis secondary to UTI  Patient is well known to the Nephrology service and followup with Dr Roger Madison as an outpatient for management of CKD stage 5 with a baseline creatinine of 4 5  His CKD is medically managed and as per chart review he has not been amenable for HD in case this would be needed in the future  At time of my assessment today patient is alert but oriented only to person  He is not aware that he is in the hospital and denies any current complaints  Does not remember passing out at home  Becomes tearful and states that he wants to go home and needs to be with his wife  Denies fever/chills, cough, SOB, chest pain, abdominal discomfort or discomfort with urination  Patient has chronic Haile in place draining clear urine  PAST MEDICAL HISTORY     Past Medical History:   Diagnosis Date    Acute pancreatitis without infection or necrosis 9/26/2019    Age-related nuclear cataract, right 11/5/2020    Anemia of chronic renal failure     BPH (benign prostatic hyperplasia)     CKD (chronic kidney disease) stage 5, GFR less than 15 ml/min (HCC)     Coronary artery disease     COVID-19     DJD (degenerative joint disease)     Essential hypertension     Gait disturbance     Generalized weakness     GERD (gastroesophageal reflux disease)     Gout     History of transfusion     Hydronephrosis     Hyperlipidemia     Hypertension     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)     Pressure injury of skin     Proteinuria     Thrombophlebitis migrans     Type 2 diabetes mellitus        PAST SURGICAL HISTORY     Past Surgical History:   Procedure Laterality Date    CATARACT EXTRACTION Right 11/05/2020    CATARACT EXTRACTION W/ INTRAOCULAR LENS IMPLANT Right 11/5/2020    Procedure: EYE OD CATARACT EXTRACTION WITH IOL INSERTION;  Surgeon: Yo Encarnacion MD;  Location: [facility] MAIN OR;  Service: Ophthalmology    CHOLECYSTECTOMY     [address] [place] [address] N/A 2/7/2020    Procedure: CHOLECYSTECTOMY LAPAROSCOPIC;  Surgeon:  Jennifer Bailey MD;  Location: [facility] MAIN OR;  Service: General    CORONARY ANGIOPLASTY WITH STENT PLACEMENT      FL RETROGRADE PYELOGRAM  5/21/2020    IR BIOPSY LIVER MASS  1/24/2019    IR CHOLECYSTOSTOMY TUBE PLACEMENT  9/6/2019    IR NON-TUNNELED CENTRAL LINE PLACEMENT  1/28/2021    NE CYSTO/URETERO W/LITHOTRIPSY &INDWELL STENT INSRT Left 5/21/2020    Procedure: CYSTOSCOPY URETEROSCOPY WITH LITHOTRIPSY HOLMIUM LASER, RETROGRADE PYELOGRAM AND INSERTION STENT URETERAL;  Surgeon: Victor Hugo Willingham MD;  Location: [facility] MA[facility];  Service: Urology    NE CYSTOURETHROSCOPY,URETER CATHETER Left 4/21/2020    Procedure: CYSTOSCOPY WITH INSERTION STENT URETERAL;  Surgeon: Burt Jackson MD;  Location[facility];  Service: Urology       ALLERGIES     No Known Allergies    SOCIAL HISTORY     Social History     Substance and Sexual Activity   Alcohol Use Never    Alcohol/week: 0 0 standard drinks    Frequency: Never     Social History     Substance and Sexual Activity   Drug Use Never     Social History     Tobacco Use   Smoking Status Never Smoker   Smokeless Tobacco Never Used       FAMILY HISTORY     Family History   Problem Relation Age of Onset    Cancer Mother     Hypertension Father     Cancer Brother     Cancer Maternal Grandmother     Cancer Paternal Aunt        CURRENT MEDICATIONS       Current Facility-Administered Medications:     acetaminophen (TYLENOL) tablet 650 mg, 650 mg, Oral, Q6H PRN, Mary Veronica MD    aspirin chewable tablet 81 mg, 81 mg, Oral, Daily, Best BAUTISTA MD, 81 mg at 02/11/21 0925    heparin (porcine) subcutaneous injection 5,000 Units, 5,000 Units, Subcutaneous, Q8H [facility], Best BAUTISTA MD, 5,000 Units at 02/11/21 0525    insulin lispro (HumaLOG) 100 units/mL subcutaneous injection 1-5 Units, 1-5 Units, Subcutaneous, 4x Daily (AC & HS), 1 Units at 02/11/21 1151 **AND** Fingerstick Glucose (POCT), , , 4x Daily AC and at bedtime, Mario Diego PA-C    meropenem (MERREM) 500 mg in sodium chloride 0 9 % 50 mL IVPB, 500 mg, Intravenous, Q12H, Mukesh Butts MD, Last Rate: 100 mL/hr at 02/11/21 0151, 500 mg at 02/11/21 0151    metoprolol tartrate (LOPRESSOR) partial tablet 12 5 mg, 12 5 mg, Oral, Q12H [facility], Bernarda Pantoja MD, 12 5 mg at 02/11/21 0926    NIFEdipine (PROCARDIA XL) 24 hr tablet 60 mg, 60 mg, Oral, Daily, Best BAUTISTA MD, 60 mg at 02/11/21 0925    pantoprazole (PROTONIX) EC tablet 40 mg, 40 mg, Oral, Daily, Best BAUTISTA MD, 40 mg at 02/11/21 0926    sodium bicarbonate tablet 325 mg, 325 mg, Oral, BID, Best BAUTISTA MD, 325 mg at 02/11/21 0926    sodium chloride 0 9 % infusion, 125 mL/hr, Intravenous, Continuous, Yvonne Hu MD, Last Rate: 125 mL/hr at 02/11/21 0930, 125 mL/hr at 02/11/21 0930    tamsulosin (FLOMAX) capsule 0 4 mg, 0 4 mg, Oral, Daily With Dinner, Idalia Scott MD, 0 4 mg at 02/10/21 1755    vancomycin (VANCOCIN) oral solution 125 mg, 125 mg, Oral, Q12H [address], Best BAUTISTA MD, 125 mg at 02/11/21 6138    REVIEW OF SYSTEMS     Complete 10 points of review of systems were obtained and discussed in length with patient today  Complete 10 points of review of systems were negative/unremarkable except mentioned in the HPI section  OBJECTIVE     Current Weight: Weight - Scale: 67 6 kg (149 lb 0 5 oz)  /68   Pulse 73   Temp 97 5 °F (36 4 °C)   Resp 17   Ht 5' 8" (1 727 m)   Wt 67 6 kg (149 lb 0 5 oz)   SpO2 99%   BMI 22 66 kg/m²   Vitals:    02/11/21 1134   BP: 149/68   Pulse:    Resp:    Temp: 97 5 °F (36 4 °C)   SpO2:      Body mass index is 22 66 kg/m²  Intake/Output Summary (Last 24 hours) at 2/11/2021 1303  Last data filed at 2/11/2021 2673  Gross per 24 hour   Intake 240 ml   Output 650 ml   Net -410 ml       PHYSICAL EXAMINATION     Physical Exam  Constitutional:       General: He is not in acute distress  Appearance: Normal appearance  He is ill-appearing (Chronically ill appearing)  HENT:      Head: Normocephalic and atraumatic  Mouth/Throat:      Mouth: Mucous membranes are moist       Pharynx: Oropharynx is clear  Eyes:      General: No scleral icterus  Conjunctiva/sclera: Conjunctivae normal    Neck:      Musculoskeletal: Normal range of motion and neck supple  Cardiovascular:      Rate and Rhythm: Normal rate and regular rhythm  Pulses: Normal pulses  Heart sounds: Normal heart sounds  No murmur  No friction rub  No gallop  Pulmonary:      Effort: Pulmonary effort is normal       Breath sounds: Normal breath sounds  Abdominal:      General: Abdomen is flat   Bowel sounds are normal       Palpations: Abdomen is soft  Tenderness: There is no abdominal tenderness  Musculoskeletal:      Right lower leg: No edema  Left lower leg: No edema  Skin:     General: Skin is warm and dry  Neurological:      General: No focal deficit present  Mental Status: He is alert  He is disoriented  Psychiatric:         Mood and Affect: Affect is labile  Behavior: Behavior is cooperative  LAB RESULTS        Results from last 7 days   Lab Units 02/11/21  0522 02/10/21  0521 02/09/21  1658   WBC Thousand/uL 6 95 6 69 14 60*   HEMOGLOBIN g/dL 7 8* 7 9* 10 1*   HEMATOCRIT % 24 4* 24 1* 30 6*   PLATELETS Thousands/uL 131* 131* 216   SODIUM mmol/L 142 144 136   POTASSIUM mmol/L 3 5 3 6 3 8   CHLORIDE mmol/L 110* 110* 102   CO2 mmol/L 23 21 21   BUN mg/dL 43* 44* 49*   CREATININE mg/dL 5 30* 5 54* 6 08*   EGFR ml/min/1 73sq m 9 9 8   CALCIUM mg/dL 7 7* 7 9* 8 4       I have personally reviewed the old medical records and patient's previously known baseline creatinine level is ~ 4 5    RADIOLOGY RESULTS     XR chest 1 view portable   Final Result by Alfa Mac MD (02/09 1706)      No acute cardiopulmonary disease  Workstation performed: [id number]             PLAN / RECOMMENDATIONS      1  SILVERIO on CKD stage 5  · POA, creatinine on admission 6 08  · Creatinine is trending down with IVF hydration; 6 03 > 5 54 > 5 30 (today)  · Electrolytes are WNL's, anemia present, no leukocytosis  · UA on admission significant for proteinuria, pyuria and bacteria  UCx grows klebsiella oxytoca  Plan:  · Likely mixed picture of SILVERIO as well as progression of CKD  New baseline might be close to 5 0  Etiology of SILVERIO most likely prerenal and creatinine is improving with IVF  Post renal less likely since patient has chronic indwelling Haile  · Continue fluid hydration with 125 ml/hr 0 9% NS    · Monitor creatine trend and I/Os     · Will get phosphorus, PTH, and vitamin D levels and urine microalbumine/creatinine ratio, random sodium and urea nitrogen as work up for CKD  · Avoid nephrotoxic agents  2  Anemia  · Likely in setting of CKD  Plan:  · Will get iron panel to see if there is concurrent iron deficiency anemia  If present patient would benefit from IV iron replacement  For anemia of chronic disease 2/2 CKD patient would qualify for Epogen treatment  3  UTI - On AB regimen per primary team      4  Chronic urinary retention with chronic indwelling Haile catheter  Thank you for the consultation to participate in patient's care  Plan as outlined above to be discussed with attending nephrologist, Dr nAgy Berkowitz MD  [facility] PGY-2 resident  2/11/2021        Portions of the record may have been created with voice recognition software  Occasional wrong word or "sound a like" substitutions may have occurred due to the inherent limitations of voice recognition software  Read the chart carefully and recognize, using context, where substitutions have occurred

## 2021-02-11 NOTE — PLAN OF CARE
Problem: Potential for Falls  Goal: Patient will remain free of falls  Description: INTERVENTIONS:  - Assess patient frequently for physical needs  -  Identify cognitive and physical deficits and behaviors that affect risk of falls    -  [place] fall precautions as indicated by assessment   - Educate patient/family on patient safety including physical limitations  - Instruct patient to call for assistance with activity based on assessment  - Modify environment to reduce risk of injury  - Consider OT/PT consult to assist with strengthening/mobility  Outcome: Progressing     Problem: Prexisting or High Potential for Compromised Skin Integrity  Goal: Skin integrity is maintained or improved  Description: INTERVENTIONS:  - Identify patients at risk for skin breakdown  - Assess and monitor skin integrity  - Assess and monitor nutrition and hydration status  - Monitor labs   - Assess for incontinence   - Turn and reposition patient  - Assist with mobility/ambulation  - Relieve pressure over bony prominences  - Avoid friction and shearing  - Provide appropriate hygiene as needed including keeping skin clean and dry  - Evaluate need for skin moisturizer/barrier cream  - Collaborate with interdisciplinary team   - Patient/family teaching  - Consider wound care consult   Outcome: Progressing     Problem: PAIN - ADULT  Goal: Verbalizes/displays adequate comfort level or baseline comfort level  Description: Interventions:  - Encourage patient to monitor pain and request assistance  - Assess pain using appropriate pain scale  - Administer analgesics based on type and severity of pain and evaluate response  - Implement non-pharmacological measures as appropriate and evaluate response  - Consider cultural and social influences on pain and pain management  - Notify physician/advanced practitioner if interventions unsuccessful or patient reports new pain  Outcome: Progressing     Problem: INFECTION - ADULT  Goal: Absence or prevention of progression during hospitalization  Description: INTERVENTIONS:  - Assess and monitor for signs and symptoms of infection  - Monitor lab/diagnostic results  - Monitor all insertion sites, i e  indwelling lines, tubes, and drains  - Monitor endotracheal if appropriate and nasal secretions for changes in amount and color  - [place] appropriate cooling/warming therapies per order  - Administer medications as ordered  - Instruct and encourage patient and family to use good hand hygiene technique  - Identify and instruct in appropriate isolation precautions for identified infection/condition  Outcome: Progressing  Goal: Absence of fever/infection during neutropenic period  Description: INTERVENTIONS:  - Monitor WBC    Outcome: Progressing     Problem: SAFETY ADULT  Goal: Patient will remain free of falls  Description: INTERVENTIONS:  - Assess patient frequently for physical needs  -  Identify cognitive and physical deficits and behaviors that affect risk of falls    -  [place] fall precautions as indicated by assessment   - Educate patient/family on patient safety including physical limitations  - Instruct patient to call for assistance with activity based on assessment  - Modify environment to reduce risk of injury  - Consider OT/PT consult to assist with strengthening/mobility  Outcome: Progressing  Goal: Maintain or return to baseline ADL function  Description: INTERVENTIONS:  -  Assess patient's ability to carry out ADLs; assess patient's baseline for ADL function and identify physical deficits which impact ability to perform ADLs (bathing, care of mouth/teeth, toileting, grooming, dressing, etc )  - Assess/evaluate cause of self-care deficits   - Assess range of motion  - Assess patient's mobility; develop plan if impaired  - Assess patient's need for assistive devices and provide as appropriate  - Encourage maximum independence but intervene and supervise when necessary  - Involve family in performance of ADLs  - Assess for home care needs following discharge   - Consider OT consult to assist with ADL evaluation and planning for discharge  - Provide patient education as appropriate  Outcome: Progressing  Goal: Maintain or return mobility status to optimal level  Description: INTERVENTIONS:  - Assess patient's baseline mobility status (ambulation, transfers, stairs, etc )    - Identify cognitive and physical deficits and behaviors that affect mobility  - Identify mobility aids required to assist with transfers and/or ambulation (gait belt, sit-to-stand, lift, walker, cane, etc )  - [place] fall precautions as indicated by assessment  - Record patient progress and toleration of activity level on Mobility SBAR; progress patient to next Phase/Stage  - Instruct patient to call for assistance with activity based on assessment  - Consider rehabilitation consult to assist with strengthening/weightbearing, etc   Outcome: Progressing     Problem: DISCHARGE PLANNING  Goal: Discharge to home or other facility with appropriate resources  Description: INTERVENTIONS:  - Identify barriers to discharge w/patient and caregiver  - Arrange for needed discharge resources and transportation as appropriate  - Identify discharge learning needs (meds, wound care, etc )  - Arrange for interpretive services to assist at discharge as needed  - Refer to Case Management Department for coordinating discharge planning if the patient needs post-hospital services based on physician/advanced practitioner order or complex needs related to functional status, cognitive ability, or social support system  Outcome: Progressing     Problem: Knowledge Deficit  Goal: Patient/family/caregiver demonstrates understanding of disease process, treatment plan, medications, and discharge instructions  Description: Complete learning assessment and assess knowledge base    Interventions:  - Provide teaching at level of understanding  - Provide teaching via preferred learning methods  Outcome: Progressing     Problem: Nutrition/Hydration-ADULT  Goal: Nutrient/Hydration intake appropriate for improving, restoring or maintaining nutritional needs  Description: Monitor and assess patient's nutrition/hydration status for malnutrition  Collaborate with interdisciplinary team and initiate plan and interventions as ordered  Monitor patient's weight and dietary intake as ordered or per policy  Utilize nutrition screening tool and intervene as necessary  Determine patient's food preferences and provide high-protein, high-caloric foods as appropriate       INTERVENTIONS:  - Monitor oral intake, urinary output, labs, and treatment plans  - Assess nutrition and hydration status and recommend course of action  - Evaluate amount of meals eaten  - Assist patient with eating if necessary   - Allow adequate time for meals  - Recommend/ encourage appropriate diets, oral nutritional supplements, and vitamin/mineral supplements  - Order, calculate, and assess calorie counts as needed  - Recommend, monitor, and adjust tube feedings and TPN/PPN based on assessed needs  - Assess need for intravenous fluids  - Provide specific nutrition/hydration education as appropriate  - Include patient/family/caregiver in decisions related to nutrition  Outcome: Progressing

## 2021-02-11 NOTE — CASE MANAGEMENT
LOS 3    Case mnager spoke to son on the phone since SO was unavailable  Hw lives with his SO in a house with no steps to get in and no steps inside  He has a walker and a W/C, and a scooter  He uses the walker inside the house and the wal;ker inside  He has [facility] aides come in and do his ADL's  He purchases his medications at [facility] in [place]  He is able to afford his copays  There is no history of mental illness nor substance abuse  THis son Aysha Riojas is his POA  He does not drive but SO does drive and will transport home at [place]  STR is refused by family  [facility]errals sent to [facility],  CM reviewed discharge planning process including the following: identifying help at home, patient preference for discharge planning needs, pharmacy preference, and availability of treatment team to discuss questions or concerns patient and/or family may have regarding understanding medications and recognizing signs and symptoms once discharged  CM also encouraged patient to follow up with all recommended appointments after discharge  Patient advised of importance for patient and family to participate in managing patients medical well being  Pt is here with UTI and sepsis  Will return home at [place]  Family refuses STR  [facility] now [facility]  referal sent  SO will transport home

## 2021-02-11 NOTE — UTILIZATION REVIEW
Notification of Inpatient Admission/Inpatient Authorization Request   This is a Notification of Inpatient Admission for [address]  Be advised that this patient was admitted to our facility under Inpatient Status  Contact [facility] Postal at [phone] for additional admission information  [facility] DEPT DEDICATED Giovany Cruz [phone]  Patient Name:   Jay Jay Queen   YOB: 1940       [address]   [address]:   [address]   Tax ID: [id number]  NPI: [id number] Attending Provider/NPI:  Phone:  Address: Brandy Brar [[id number]]  [phone]  Same as Facility   Place of Service Code: 24     Place of Service Name:  [address] [place] [address]   Start Date: 2/9/21 1842 Discharge Date & Time: No discharge date for patient encounter  Type of Admission: Inpatient Status Discharge Disposition   (if discharged): Home with [address] [employer] [facility]   Patient Diagnoses: Atrial fibrillation ([facility] [address] ) [I48 91]  Dehydration [E86 0]  Fatigue [R53 83]  Weakness [R53 1]  SILVERIO (acute kidney injury) ([facility] [address] ) [N17 9]     Orders: Admission Orders (From admission, onward)     Ordered        02/09/21 1842  Inpatient Admission  Once                    Assigned Utilization Review Contact: Preston Valencia  Utilization   Network Utilization Review Department  Phone: [phone]; Fax [phone]  Email: [email or web address]   ATTENTION PAYERS: Please call the assigned Utilization  directly with any questions or concerns ALL voicemails in the department are confidential  Send all requests for admission clinical reviews, approved or denied determinations and any other requests to dedicated fax number belonging to the campus where the patient is receiving treatment

## 2021-02-12 NOTE — PLAN OF CARE
Problem: Potential for Falls  Goal: Patient will remain free of falls  Description: INTERVENTIONS:  - Assess patient frequently for physical needs  -  Identify cognitive and physical deficits and behaviors that affect risk of falls    -  [place] fall precautions as indicated by assessment   - Educate patient/family on patient safety including physical limitations  - Instruct patient to call for assistance with activity based on assessment  - Modify environment to reduce risk of injury  - Consider OT/PT consult to assist with strengthening/mobility  Outcome: Progressing     Problem: Prexisting or High Potential for Compromised Skin Integrity  Goal: Skin integrity is maintained or improved  Description: INTERVENTIONS:  - Identify patients at risk for skin breakdown  - Assess and monitor skin integrity  - Assess and monitor nutrition and hydration status  - Monitor labs   - Assess for incontinence   - Turn and reposition patient  - Assist with mobility/ambulation  - Relieve pressure over bony prominences  - Avoid friction and shearing  - Provide appropriate hygiene as needed including keeping skin clean and dry  - Evaluate need for skin moisturizer/barrier cream  - Collaborate with interdisciplinary team   - Patient/family teaching  - Consider wound care consult   Outcome: Progressing     Problem: PAIN - ADULT  Goal: Verbalizes/displays adequate comfort level or baseline comfort level  Description: Interventions:  - Encourage patient to monitor pain and request assistance  - Assess pain using appropriate pain scale  - Administer analgesics based on type and severity of pain and evaluate response  - Implement non-pharmacological measures as appropriate and evaluate response  - Consider cultural and social influences on pain and pain management  - Notify physician/advanced practitioner if interventions unsuccessful or patient reports new pain  Outcome: Progressing     Problem: INFECTION - ADULT  Goal: Absence or prevention of progression during hospitalization  Description: INTERVENTIONS:  - Assess and monitor for signs and symptoms of infection  - Monitor lab/diagnostic results  - Monitor all insertion sites, i e  indwelling lines, tubes, and drains  - Monitor endotracheal if appropriate and nasal secretions for changes in amount and color  - [place] appropriate cooling/warming therapies per order  - Administer medications as ordered  - Instruct and encourage patient and family to use good hand hygiene technique  - Identify and instruct in appropriate isolation precautions for identified infection/condition  Outcome: Progressing  Goal: Absence of fever/infection during neutropenic period  Description: INTERVENTIONS:  - Monitor WBC    Outcome: Progressing     Problem: SAFETY ADULT  Goal: Patient will remain free of falls  Description: INTERVENTIONS:  - Assess patient frequently for physical needs  -  Identify cognitive and physical deficits and behaviors that affect risk of falls    -  [place] fall precautions as indicated by assessment   - Educate patient/family on patient safety including physical limitations  - Instruct patient to call for assistance with activity based on assessment  - Modify environment to reduce risk of injury  - Consider OT/PT consult to assist with strengthening/mobility  Outcome: Progressing  Goal: Maintain or return to baseline ADL function  Description: INTERVENTIONS:  -  Assess patient's ability to carry out ADLs; assess patient's baseline for ADL function and identify physical deficits which impact ability to perform ADLs (bathing, care of mouth/teeth, toileting, grooming, dressing, etc )  - Assess/evaluate cause of self-care deficits   - Assess range of motion  - Assess patient's mobility; develop plan if impaired  - Assess patient's need for assistive devices and provide as appropriate  - Encourage maximum independence but intervene and supervise when necessary  - Involve family in performance of ADLs  - Assess for home care needs following discharge   - Consider OT consult to assist with ADL evaluation and planning for discharge  - Provide patient education as appropriate  Outcome: Progressing  Goal: Maintain or return mobility status to optimal level  Description: INTERVENTIONS:  - Assess patient's baseline mobility status (ambulation, transfers, stairs, etc )    - Identify cognitive and physical deficits and behaviors that affect mobility  - Identify mobility aids required to assist with transfers and/or ambulation (gait belt, sit-to-stand, lift, walker, cane, etc )  - [place] fall precautions as indicated by assessment  - Record patient progress and toleration of activity level on Mobility SBAR; progress patient to next Phase/Stage  - Instruct patient to call for assistance with activity based on assessment  - Consider rehabilitation consult to assist with strengthening/weightbearing, etc   Outcome: Progressing     Problem: DISCHARGE PLANNING  Goal: Discharge to home or other facility with appropriate resources  Description: INTERVENTIONS:  - Identify barriers to discharge w/patient and caregiver  - Arrange for needed discharge resources and transportation as appropriate  - Identify discharge learning needs (meds, wound care, etc )  - Arrange for interpretive services to assist at discharge as needed  - Refer to Case Management Department for coordinating discharge planning if the patient needs post-hospital services based on physician/advanced practitioner order or complex needs related to functional status, cognitive ability, or social support system  Outcome: Progressing     Problem: Knowledge Deficit  Goal: Patient/family/caregiver demonstrates understanding of disease process, treatment plan, medications, and discharge instructions  Description: Complete learning assessment and assess knowledge base    Interventions:  - Provide teaching at level of understanding  - Provide teaching via preferred learning methods  Outcome: Progressing     Problem: Nutrition/Hydration-ADULT  Goal: Nutrient/Hydration intake appropriate for improving, restoring or maintaining nutritional needs  Description: Monitor and assess patient's nutrition/hydration status for malnutrition  Collaborate with interdisciplinary team and initiate plan and interventions as ordered  Monitor patient's weight and dietary intake as ordered or per policy  Utilize nutrition screening tool and intervene as necessary  Determine patient's food preferences and provide high-protein, high-caloric foods as appropriate       INTERVENTIONS:  - Monitor oral intake, urinary output, labs, and treatment plans  - Assess nutrition and hydration status and recommend course of action  - Evaluate amount of meals eaten  - Assist patient with eating if necessary   - Allow adequate time for meals  - Recommend/ encourage appropriate diets, oral nutritional supplements, and vitamin/mineral supplements  - Order, calculate, and assess calorie counts as needed  - Recommend, monitor, and adjust tube feedings and TPN/PPN based on assessed needs  - Assess need for intravenous fluids  - Provide specific nutrition/hydration education as appropriate  - Include patient/family/caregiver in decisions related to nutrition  Outcome: Progressing
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
## 2021-02-12 NOTE — PROGRESS NOTES
NEPHROLOGY PROGRESS NOTE    Patient: Mike Leon               Sex: male          DOA: 2/9/2021  4:18 PM   Date of Birth: @        Age: @        LOS:  LOS: 3 days   2/12/2021    REASON FOR THE CONSULTATION: SILVERIO on CKD stage 5    HPI     This is a 79 y o  male admitted for Sepsis ([facility] [address] )     SUBJECTIVE     - Patient denies nausea, vomiting, headache or dizziness today, but history is limited due to mental status  - Reviewed last 24 hrs events --> patient agitated overnight and currently on soft wrist restraints  AAOx2 today however seems to have poor disease insight       CURRENT MEDICATIONS       Current Facility-Administered Medications:     acetaminophen (TYLENOL) tablet 650 mg, 650 mg, Oral, Q6H PRN, Vianney BAUTISTA MD    aspirin chewable tablet 81 mg, 81 mg, Oral, Daily, Best BAUTISTA MD, 81 mg at 02/11/21 0925    heparin (porcine) subcutaneous injection 5,000 Units, 5,000 Units, Subcutaneous, Q8H [facility], Best BAUTISTA MD, 5,000 Units at 02/11/21 1354    insulin lispro (HumaLOG) 100 units/mL subcutaneous injection 1-5 Units, 1-5 Units, Subcutaneous, 4x Daily (AC & HS), 2 Units at 02/11/21 2135 **AND** Fingerstick Glucose (POCT), , , 4x Daily AC and at bedtime, Nick Acosta PA-C    meropenem (MERREM) 500 mg in sodium chloride 0 9 % 50 mL IVPB, 500 mg, Intravenous, Q12H, Mukesh Butts MD, Last Rate: 100 mL/hr at 02/12/21 0403, 500 mg at 02/12/21 0403    metoprolol tartrate (LOPRESSOR) partial tablet 12 5 mg, 12 5 mg, Oral, Q12H [facility], Jason Sepulveda MD, 12 5 mg at 02/11/21 2134    NIFEdipine (PROCARDIA XL) 24 hr tablet 60 mg, 60 mg, Oral, Daily, Best BAUTISTA MD, 60 mg at 02/11/21 0925    OLANZapine (ZyPREXA) IM injection 2 5 mg, 2 5 mg, Intramuscular, BID PRN, Everlene Spurling, MD, 2 5 mg at 02/12/21 0059    pantoprazole (PROTONIX) EC tablet 40 mg, 40 mg, Oral, Daily, Best BAUTISTA MD, 40 mg at 02/11/21 0926    sodium bicarbonate tablet 325 mg, 325 mg, Oral, BID, Best BAUTISTA MD, 325 mg at 02/11/21 1734    sodium chloride 0 9 % infusion, 125 mL/hr, Intravenous, Continuous, Ten Arguello MD, Last Rate: 125 mL/hr at 02/12/21 0403, 125 mL/hr at 02/12/21 0403    tamsulosin (FLOMAX) capsule 0 4 mg, 0 4 mg, Oral, Daily With Dinner, Tereza Odell MD, 0 4 mg at 02/11/21 1734    vancomycin (VANCOCIN) oral solution 125 mg, 125 mg, Oral, Q12H [address], Kaylee BAUTISTA MD, 125 mg at 02/11/21 2138    OBJECTIVE     Current Weight: Weight - Scale: 67 6 kg (149 lb 0 5 oz)  /93 (BP Location: Right arm)   Pulse 87   Temp 98 5 °F (36 9 °C) (Axillary)   Resp 21   Ht 5' 8" (1 727 m)   Wt 67 6 kg (149 lb 0 5 oz)   SpO2 98%   BMI 22 66 kg/m²   Vitals:    02/12/21 0533   BP: 169/93   Pulse: 87   Resp: 21   Temp: 98 5 °F (36 9 °C)   SpO2: 98%     Body mass index is 22 66 kg/m²  Intake/Output Summary (Last 24 hours) at 2/12/2021 0940  Last data filed at 2/11/2021 2235  Gross per 24 hour   Intake 1292 5 ml   Output 1450 ml   Net -157 5 ml       PHYSICAL EXAMINATION     Physical Exam  Constitutional:       General: He is in acute distress (agitated )  Appearance: Normal appearance  He is ill-appearing (chronically ill apearing)  HENT:      Head: Normocephalic and atraumatic  Mouth/Throat:      Mouth: Mucous membranes are dry  Pharynx: Oropharynx is clear  Eyes:      General: No scleral icterus  Conjunctiva/sclera: Conjunctivae normal    Neck:      Musculoskeletal: Normal range of motion and neck supple  Cardiovascular:      Rate and Rhythm: Normal rate and regular rhythm  Pulses: Normal pulses  Heart sounds: Normal heart sounds  Pulmonary:      Effort: Pulmonary effort is normal       Breath sounds: Normal breath sounds  Abdominal:      General: Abdomen is flat  Bowel sounds are normal       Palpations: Abdomen is soft  Musculoskeletal:      Right lower leg: No edema  Left lower leg: No edema  Skin:     General: Skin is warm and dry     Neurological:      General: No focal deficit present  Mental Status: He is alert  Comments: Limited due to uncooperation   Psychiatric:         Attention and Perception: Attention normal          Mood and Affect: Affect is angry  Behavior: Behavior is uncooperative and agitated  LAB RESULTS     Results from last 7 days   Lab Units 02/12/21  0402 02/12/21  0401 02/11/21  0522 02/10/21  0521 02/09/21  1658   WBC Thousand/uL  --  7 98 6 95 6 69 14 60*   HEMOGLOBIN g/dL  --  9 1* 7 8* 7 9* 10 1*   HEMATOCRIT %  --  28 2* 24 4* 24 1* 30 6*   PLATELETS Thousands/uL  --  154 131* 131* 216   SODIUM mmol/L 142  --  142 144 136   POTASSIUM mmol/L 4 0  --  3 5 3 6 3 8   CHLORIDE mmol/L 108  --  110* 110* 102   CO2 mmol/L 23  --  23 21 21   BUN mg/dL 41*  --  43* 44* 49*   CREATININE mg/dL 5 04*  --  5 30* 5 54* 6 08*   EGFR ml/min/1 73sq m 10  --  9 9 8   CALCIUM mg/dL 8 2*  --  7 7* 7 9* 8 4   PHOSPHORUS mg/dL 3 6  --   --   --   --        I have personally reviewed the old medical records and patient's previously known baseline creatinine level is ~ 4 5    RADIOLOGY RESULTS      XR chest 1 view portable   Final Result by Nora Castro MD (02/09 1706)      No acute cardiopulmonary disease  Workstation performed: [id number]             PLAN / RECOMMENDATIONS      1  SILVERIO on CKD stage 5  · POA, creatinine on admission 6 08  · Creatinine is trending down with IVF hydration; 6 03 > 5 54 > 5 30> 5 04 (today)  · Electrolytes are WNL's, anemia present, no leukocytosis  · UA on admission significant for proteinuria, pyuria and bacteria  UCx grows klebsiella oxytoca  · Urine microalbumin/creatinine ratio is 61 which is better then prior measurements  Usodium is 83, Uurea 324  Phosphorus is normal at 3 6  Plan:  · Etiology of SILVERIO most likely prerenal in setting of intravascular volume depletion and creatinine is improving with IVF  · Clinically no signs of volume overload   Would continue fluid hydration today at decreased rate of 75 ml/hr  · Follow PTH and vitamin D level  · Monitor creatine trend and I/Os  · Avoid nephrotoxic agents       2  Anemia  · Likely in setting of CKD  · Hb improved to 9 1 today  Plan:  · Iron panel consistent with anemia from chronic disease picture  No evidence of iron deficiency  Since Hb is below 10 patient would benefit from Epogen treatment to target Hb between 10-12  · Consider transfusion for Hb < 7       3  UTI - On AB regimen per primary team       4  Chronic urinary retention with chronic indwelling Haile catheter  Overall above mentioned plan to be discussed with attending nephrologist, Dr Gabino Garcia MD  IM PGY-2 resident  2/12/2021        Portions of the record may have been created with voice recognition software  Occasional wrong word or "sound a like" substitutions may have occurred due to the inherent limitations of voice recognition software  Read the chart carefully and recognize, using context, where substitutions have occurred

## 2021-02-12 NOTE — PROGRESS NOTES
Progress Note - Mike Leon 1940, 79 y o  male MRN: [id number]    Unit/Bed#: -01 Encounter: [id number]    Primary Care Provider: RIKA Patel   Date and time admitted to hospital: 2/9/2021  4:18 PM        Depression  Assessment & Plan  Patient looks depressed and agitated at time  He is oriented x 2 (place and person)  Psych consult    Will initiate low-dose of Seroquel 12 5 mg HS  Will follow-up with QTC tomorrow      Anemia  Assessment & Plan  Hemoglobin improved to 9 1  Multifactorial   Anemia of chronic kidney disease is one of the components  Continue to monitor  Will consider to transfuse if hemoglobin less than 7    UTI (urinary tract infection) due to urinary indwelling catheter [facility])  Assessment & Plan  UTI due to chronic indwelling Garcia catheter, as evidenced by UTI in a patient with a chronic indwelling garcia catheter, requiring IV antibiotics and possible change of catheter while inpatient  Continue meropenem   Urine culture growing Klebsiella oxytoca and Providencia rettgeri  Blood culture negative x2 so far  Will deescalate antibiotics according to culture and sensitivity results       Moderate protein-calorie malnutrition ([address] )  Assessment & Plan  Malnutrition Findings:   Adult Malnutrition type: Chronic illness  Adult Degree of Malnutrition: Malnutrition of moderate degree    BMI Findings: Body mass index is 22 66 kg/m²  Nutrition consult, appreciate recommendation  Atrial fibrillation 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, fall risk, anemia, dementia  Rate has been controlled with metoprolol  Cardiology consult, appreciate recommendation      History of Clostridioides difficile infection  Assessment & Plan  Denies any fever, chills, nausea, vomiting and diarrhea  Currently on prophylaxis p o  Vanco    Acute kidney injury superimposed on CKD [facility])  Assessment & Plan  Lab Results   Component Value Date    EGFR 10 02/12/2021    EGFR 9 02/11/2021    EGFR 9 02/10/2021    CREATININE 5 04 (H) 02/12/2021    CREATININE 5 30 (H) 02/11/2021    CREATININE 5 54 (H) 02/10/2021     Baseline creatinine appears to be around 4 5  CKD stage 5 not on dialysis  Creatinine improving  nephro consult, recommended to give IV hydration in light of intravascular volume depletion  Avoid nephro toxins  Elevated d-dimer  Assessment & Plan  D-dimer 2 71  Troponin negative  Denies any chest pain, shortness of breath, palpitation, leg swelling  VTE unlikely according to age-adjusted d dimer  Patient does have a history of Covid recently on 1/24/2021  Continue to monitor vital signs  Generalized weakness  Assessment & Plan  Patient was recommended for STR on prior hospitalization  However, patient was discharged with Home health as his choice of SNF did not have available bed  PT/OT consult  CM consult  Discussed with the patient and significant other and they are not agreeable to go to rehab    Will discuss today again    Essential hypertension  Assessment & Plan  BP is reasonably controlled  Continue nifedipine and metoprolol      Type 2 diabetes mellitus with hyperglycemia, with long-term current use of insulin [facility])  Assessment & Plan  Lab Results   Component Value Date    HGBA1C 6 8 (H) 09/21/2020       Recent Labs     02/11/21  1535 02/11/21  2104 02/12/21  0550 02/12/21  1103   POCGLU 193* 258* 130 121       Blood Sugar Average: Last 72 hrs:  (P) 164 4   Sugar at goal  Acute check and SSI  Currently on a regular diet as per Nutrition        VTE Pharmacologic Prophylaxis:   Pharmacologic: Heparin  Mechanical VTE Prophylaxis in Place: Yes    Discussions with Specialists or Other Care Team Provider: Yes    Education and Discussions with Family / Patient: Yes    Current Length of Stay: 3 day(s)    Current Patient Status: Inpatient     Discharge Plan / Estimated Discharge Date: TBD    Code Status: Level 1 - Full Code      Subjective:   He was seen and examined the bedside  He is alert and cooperative  Oriented x2  He denies any fever, chills, chest pain, palpitation, shortness of breath, any GI or  complaint  He reports he wants to home  Objective:     Vitals:   Temp (24hrs), Av 6 °F (37 °C), Min:97 9 °F (36 6 °C), Max:99 6 °F (37 6 °C)    Temp:  [97 9 °F (36 6 °C)-99 6 °F (37 6 °C)] 98 2 °F (36 8 °C)  HR:  [78-89] 87  Resp:  [16-21] 21  BP: (125-169)/(72-94) 125/94  SpO2:  [96 %-98 %] 98 %  Body mass index is 22 66 kg/m²  Input and Output Summary (last 24 hours): Intake/Output Summary (Last 24 hours) at 2021 1354  Last data filed at 2021 2235  Gross per 24 hour   Intake 1292 5 ml   Output 1450 ml   Net -157 5 ml       Physical Exam:     Physical Exam  Vitals signs and nursing note reviewed  Constitutional:       General: He is not in acute distress  Appearance: He is ill-appearing (Chronically)  He is not toxic-appearing or diaphoretic  HENT:      Head: Normocephalic and atraumatic  Eyes:      General: No scleral icterus  Conjunctiva/sclera: Conjunctivae normal    Neck:      Musculoskeletal: Normal range of motion  Cardiovascular:      Rate and Rhythm: Normal rate  Rhythm irregular  Pulses: Normal pulses  Heart sounds: Normal heart sounds  No murmur  No friction rub  Pulmonary:      Effort: Pulmonary effort is normal  No respiratory distress  Breath sounds: Normal breath sounds  No stridor  No wheezing or rhonchi  Abdominal:      General: Abdomen is flat  Bowel sounds are normal  There is no distension  Palpations: Abdomen is soft  Tenderness: There is no abdominal tenderness  There is no guarding  Genitourinary:     Comments: Indwelling urinary catheter in placed    Urine looks clean, straw color   Musculoskeletal: Normal range of motion  General: No swelling or tenderness  Right lower leg: No edema  Left lower leg: No edema  Skin:     General: Skin is warm and dry  Capillary Refill: Capillary refill takes less than 2 seconds  Coloration: Skin is not pale  Findings: No rash  Neurological:      Mental Status: He is alert  Motor: Weakness ( generalized) present  Comments: AAO x 2  Place and person  Psychiatric:      Comments: Agitated             Additional Data:     Labs:    Results from last 7 days   Lab Units 02/12/21  0401   WBC Thousand/uL 7 98   HEMOGLOBIN g/dL 9 1*   HEMATOCRIT % 28 2*   PLATELETS Thousands/uL 154   NEUTROS PCT % 66   LYMPHS PCT % 21   MONOS PCT % 11   EOS PCT % 1     Results from last 7 days   Lab Units 02/12/21  0402  02/09/21  1658   POTASSIUM mmol/L 4 0   < > 3 8   CHLORIDE mmol/L 108   < > 102   CO2 mmol/L 23   < > 21   BUN mg/dL 41*   < > 49*   CREATININE mg/dL 5 04*   < > 6 08*   CALCIUM mg/dL 8 2*   < > 8 4   ALK PHOS U/L  --   --  403*   ALT U/L  --   --  77   AST U/L  --   --  40    < > = values in this interval not displayed  Results from last 7 days   Lab Units 02/12/21  0357   INR  1 05       * I Have Reviewed All Lab Data Listed Above  * Additional Pertinent Lab Tests Reviewed: Laura 66 Admission Reviewed      Recent Cultures (last 7 days):     Results from last 7 days   Lab Units 02/09/21  1737 02/09/21  1721 02/09/21  1713   BLOOD CULTURE   --  No Growth at 48 hrs  No Growth at 48 hrs     URINE CULTURE  >100,000 cfu/ml Klebsiella oxytoca ESBL*  >100,000 cfu/ml Providencia rettgeri*  >100,000 cfu/ml Enterococcus faecalis*  --   --        Last 24 Hours Medication List:   Current Facility-Administered Medications   Medication Dose Route Frequency Provider Last Rate    acetaminophen  650 mg Oral Q6H PRN Dylan Worthington MD      aspirin  81 mg Oral Daily Best Rodriguez MD      heparin (porcine)  5,000 Units Subcutaneous Q8H [facility] Best BAUTISTA MD      insulin lispro  1-5 Units Subcutaneous 4x Daily (AC & HS) Clementine Mathis PA-C      meropenem  500 mg Intravenous Q12H Roddy Jones  mg (02/12/21 0403)    metoprolol tartrate  12 5 mg Oral Q12H [facility] Carley Malone MD      NIFEdipine ER  60 mg Oral Daily Best BAUTISTA MD      OLANZapine  2 5 mg Intramuscular BID PRN Roddy Jones MD      pantoprazole  40 mg Oral Daily Best BAUTISTA MD      QUEtiapine  12 5 mg Oral HS Mukesh Butts MD      sodium bicarbonate  325 mg Oral BID Best BAUTISTA MD      sodium chloride  75 mL/hr Intravenous Continuous Praveen Lara MD 75 mL/hr (02/12/21 1216)    tamsulosin  0 4 mg Oral Daily With Dinner Celia Duran MD      vancomycin  125 mg Oral Q12H [facility] Celia Duran MD          Today, Patient Was Seen By: Roddy Jones MD    ** Please Note: This note has been constructed using a voice recognition system   **

## 2021-02-12 NOTE — NURSING NOTE
Pt  Became very agitated, confused, and aggressive overnight  Pt  Was continually pulling at wires, IV tubing, throwing items across the room, and repeatedly hitting his telemetry box off his bed side rail  2 5 mg Zyprexa IM administered to pt at 0059 however pt continued to escalate  Bilateral soft limb wrist restraints applied and order obtained  Education given to pt on why soft limb wrist restraints were applied and discontinuation criteria explained  Pt continues to show no signs of improvement and is still agitated and combative      Leo [place], RN  6:25 AM

## 2021-02-13 NOTE — PLAN OF CARE
Problem: Potential for Falls  Goal: Patient will remain free of falls  Description: INTERVENTIONS:  - Assess patient frequently for physical needs  -  Identify cognitive and physical deficits and behaviors that affect risk of falls    -  [place] fall precautions as indicated by assessment   - Educate patient/family on patient safety including physical limitations  - Instruct patient to call for assistance with activity based on assessment  - Modify environment to reduce risk of injury  - Consider OT/PT consult to assist with strengthening/mobility  Outcome: Progressing     Problem: Prexisting or High Potential for Compromised Skin Integrity  Goal: Skin integrity is maintained or improved  Description: INTERVENTIONS:  - Identify patients at risk for skin breakdown  - Assess and monitor skin integrity  - Assess and monitor nutrition and hydration status  - Monitor labs   - Assess for incontinence   - Turn and reposition patient  - Assist with mobility/ambulation  - Relieve pressure over bony prominences  - Avoid friction and shearing  - Provide appropriate hygiene as needed including keeping skin clean and dry  - Evaluate need for skin moisturizer/barrier cream  - Collaborate with interdisciplinary team   - Patient/family teaching  - Consider wound care consult   Outcome: Progressing     Problem: PAIN - ADULT  Goal: Verbalizes/displays adequate comfort level or baseline comfort level  Description: Interventions:  - Encourage patient to monitor pain and request assistance  - Assess pain using appropriate pain scale  - Administer analgesics based on type and severity of pain and evaluate response  - Implement non-pharmacological measures as appropriate and evaluate response  - Consider cultural and social influences on pain and pain management  - Notify physician/advanced practitioner if interventions unsuccessful or patient reports new pain  Outcome: Progressing     Problem: INFECTION - ADULT  Goal: Absence or prevention of progression during hospitalization  Description: INTERVENTIONS:  - Assess and monitor for signs and symptoms of infection  - Monitor lab/diagnostic results  - Monitor all insertion sites, i e  indwelling lines, tubes, and drains  - Monitor endotracheal if appropriate and nasal secretions for changes in amount and color  - [place] appropriate cooling/warming therapies per order  - Administer medications as ordered  - Instruct and encourage patient and family to use good hand hygiene technique  - Identify and instruct in appropriate isolation precautions for identified infection/condition  Outcome: Progressing  Goal: Absence of fever/infection during neutropenic period  Description: INTERVENTIONS:  - Monitor WBC    Outcome: Progressing     Problem: SAFETY ADULT  Goal: Patient will remain free of falls  Description: INTERVENTIONS:  - Assess patient frequently for physical needs  -  Identify cognitive and physical deficits and behaviors that affect risk of falls    -  [place] fall precautions as indicated by assessment   - Educate patient/family on patient safety including physical limitations  - Instruct patient to call for assistance with activity based on assessment  - Modify environment to reduce risk of injury  - Consider OT/PT consult to assist with strengthening/mobility  Outcome: Progressing  Goal: Maintain or return to baseline ADL function  Description: INTERVENTIONS:  -  Assess patient's ability to carry out ADLs; assess patient's baseline for ADL function and identify physical deficits which impact ability to perform ADLs (bathing, care of mouth/teeth, toileting, grooming, dressing, etc )  - Assess/evaluate cause of self-care deficits   - Assess range of motion  - Assess patient's mobility; develop plan if impaired  - Assess patient's need for assistive devices and provide as appropriate  - Encourage maximum independence but intervene and supervise when necessary  - Involve family in performance of ADLs  - Assess for home care needs following discharge   - Consider OT consult to assist with ADL evaluation and planning for discharge  - Provide patient education as appropriate  Outcome: Progressing  Goal: Maintain or return mobility status to optimal level  Description: INTERVENTIONS:  - Assess patient's baseline mobility status (ambulation, transfers, stairs, etc )    - Identify cognitive and physical deficits and behaviors that affect mobility  - Identify mobility aids required to assist with transfers and/or ambulation (gait belt, sit-to-stand, lift, walker, cane, etc )  - [place] fall precautions as indicated by assessment  - Record patient progress and toleration of activity level on Mobility SBAR; progress patient to next Phase/Stage  - Instruct patient to call for assistance with activity based on assessment  - Consider rehabilitation consult to assist with strengthening/weightbearing, etc   Outcome: Progressing     Problem: DISCHARGE PLANNING  Goal: Discharge to home or other facility with appropriate resources  Description: INTERVENTIONS:  - Identify barriers to discharge w/patient and caregiver  - Arrange for needed discharge resources and transportation as appropriate  - Identify discharge learning needs (meds, wound care, etc )  - Arrange for interpretive services to assist at discharge as needed  - Refer to Case Management Department for coordinating discharge planning if the patient needs post-hospital services based on physician/advanced practitioner order or complex needs related to functional status, cognitive ability, or social support system  Outcome: Progressing     Problem: Knowledge Deficit  Goal: Patient/family/caregiver demonstrates understanding of disease process, treatment plan, medications, and discharge instructions  Description: Complete learning assessment and assess knowledge base    Interventions:  - Provide teaching at level of understanding  - Provide teaching via preferred learning methods  Outcome: Progressing     Problem: Nutrition/Hydration-ADULT  Goal: Nutrient/Hydration intake appropriate for improving, restoring or maintaining nutritional needs  Description: Monitor and assess patient's nutrition/hydration status for malnutrition  Collaborate with interdisciplinary team and initiate plan and interventions as ordered  Monitor patient's weight and dietary intake as ordered or per policy  Utilize nutrition screening tool and intervene as necessary  Determine patient's food preferences and provide high-protein, high-caloric foods as appropriate       INTERVENTIONS:  - Monitor oral intake, urinary output, labs, and treatment plans  - Assess nutrition and hydration status and recommend course of action  - Evaluate amount of meals eaten  - Assist patient with eating if necessary   - Allow adequate time for meals  - Recommend/ encourage appropriate diets, oral nutritional supplements, and vitamin/mineral supplements  - Order, calculate, and assess calorie counts as needed  - Recommend, monitor, and adjust tube feedings and TPN/PPN based on assessed needs  - Assess need for intravenous fluids  - Provide specific nutrition/hydration education as appropriate  - Include patient/family/caregiver in decisions related to nutrition  Outcome: Progressing     Problem: SAFETY,RESTRAINT: NV/NON-SELF DESTRUCTIVE BEHAVIOR  Goal: Remains free of harm/injury (restraint for non violent/non self-detsructive behavior)  Description: INTERVENTIONS:  - Instruct patient/family regarding restraint use   - Assess and monitor physiologic and psychological status   - Provide interventions and comfort measures to meet assessed patient needs   - Identify and implement measures to help patient regain control  - Assess readiness for release of restraint   Outcome: Progressing  Goal: Returns to optimal restraint-free functioning  Description: INTERVENTIONS:  - Assess the patient's behavior and symptoms that indicate continued need for restraint  - Identify and implement measures to help patient regain control  - Assess readiness for release of restraint   Outcome: Progressing

## 2021-02-13 NOTE — PROGRESS NOTES
Progress Note - Corin Greenwood 79 y o  male MRN: [id number]    Unit/Bed#: -01 Encounter: [id number]      Assessment and plan    Generalized weakness, recent COVID illness, urinary tract infection, PT and OT evaluation, patient would require short-term rehabilitation placement how ever patient and family wants him home  New onset atrial fibrillation, heart rate controlled, cardiology input appreciated  Not candidate for anticoagulation due to frequent falls    SILVERIO on CKD, renal function returned back to baseline  Appreciate nephrology input    Depression, delirium- give prn zyprexa for confusion and agitation, started seroquel 12 5 mg at hs-doing better this morning, off restraint    UTI- growing Klebsiella oxytoca, ESBL continue on meropenem  Subjective:   Patient sitting in a chair, denies any headache, dizziness, chest pain, shortness of breath however he is confused  Objective:     Vitals: Blood pressure 117/75, pulse (!) 116, temperature 98 8 °F (37 1 °C), resp  rate 17, height 5' 8" (1 727 m), weight 67 6 kg (149 lb 0 5 oz), SpO2 97 %  ,Body mass index is 22 66 kg/m²        Intake/Output Summary (Last 24 hours) at 2/13/2021 1245  Last data filed at 2/13/2021 2778  Gross per 24 hour   Intake 1270 ml   Output 1075 ml   Net 195 ml       Physical Exam:  General appearance:  Patient not in acute distress  Eyes:  Pupils equal reacting to light  ENT:  Moist oral mucous membranes  CVS:  S1-S2 heard, regular rate and rhythm, no pedal edema  Chest:  Bilateral air entry present, clear to auscultation  Abdomen:  Soft, nontender, bowel sounds present  CNS:  No focal neurological deficits  Genitourinary: deferred  Skin:  No acute rash   psychiatric:  No psychosis  Musculoskeletal:  No joint deformities     Invasive Devices     Peripheral Intravenous Line            Peripheral IV 02/12/21 Left Antecubital 1 day          Drain            Urethral Catheter -- days                Lab, Imaging and other studies: I have personally reviewed pertinent reports  VTE Pharmacologic Prophylaxis: Heparin  VTE Mechanical Prophylaxis: sequential compression device     Patient requires further hospitalization stay due to ESBL UTI, confusion, delirium

## 2021-02-13 NOTE — PROGRESS NOTES
NEPHROLOGY PROGRESS NOTE    Patient: Anderson Cevallos               Sex: male          DOA: 2/9/2021  4:18 PM   YOB: 1940        Age:  79 y o         LOS:  LOS: 4 days       HPI     Patient is stage 5 CKD admitted with altered mental status    SUBJECTIVE     Patient remained confused and uncooperative    Does not appear to be any distress    Still urinating well    CURRENT MEDICATIONS       Current Facility-Administered Medications:     acetaminophen (TYLENOL) tablet 650 mg, 650 mg, Oral, Q6H PRN, Swetha BAUTISTA MD    aspirin chewable tablet 81 mg, 81 mg, Oral, Daily, Best BAUTISTA MD, 81 mg at 02/13/21 0924    heparin (porcine) subcutaneous injection 5,000 Units, 5,000 Units, Subcutaneous, Q8H [facility], Best BAUTISTA MD, 5,000 Units at 02/13/21 0548    insulin lispro (HumaLOG) 100 units/mL subcutaneous injection 1-5 Units, 1-5 Units, Subcutaneous, 4x Daily (AC & HS), 1 Units at 02/12/21 2345 **AND** Fingerstick Glucose (POCT), , , 4x Daily AC and at bedtime, Malgorzata Owen PA-C    meropenem (MERREM) 500 mg in sodium chloride 0 9 % 50 mL IVPB, 500 mg, Intravenous, Q12H, Mukesh Butts MD, Last Rate: 100 mL/hr at 02/13/21 0154, 500 mg at 02/13/21 0154    metoprolol tartrate (LOPRESSOR) partial tablet 12 5 mg, 12 5 mg, Oral, Q12H [facility], Anabela Flores MD, 12 5 mg at 02/13/21 0923    NIFEdipine (PROCARDIA XL) 24 hr tablet 60 mg, 60 mg, Oral, Daily, Best BAUTISTA MD, 60 mg at 02/13/21 0923    OLANZapine (ZyPREXA) IM injection 2 5 mg, 2 5 mg, Intramuscular, BID PRN, Jamel Damon MD, 2 5 mg at 02/12/21 0059    pantoprazole (PROTONIX) EC tablet 40 mg, 40 mg, Oral, Daily, Best BAUTISTA MD, 40 mg at 02/13/21 0927    QUEtiapine (SEROquel) tablet 12 5 mg, 12 5 mg, Oral, HS, Mukesh Butts MD, 12 5 mg at 02/12/21 2331    sodium bicarbonate tablet 325 mg, 325 mg, Oral, BID, Best BAUTISTA MD, 325 mg at 02/13/21 5270    sodium chloride 0 9 % infusion, 75 mL/hr, Intravenous, Continuous, Clora Ormond, MD, Last Rate: 75 mL/hr at 02/13/21 0154, 75 mL/hr at 02/13/21 0154    tamsulosin (FLOMAX) capsule 0 4 mg, 0 4 mg, Oral, Daily With Dinner, Dylan Worthington MD, 0 4 mg at 02/12/21 1709    vancomycin (VANCOCIN) oral solution 125 mg, 125 mg, Oral, Q12H [address], Best BAUTISTA MD, 125 mg at 02/13/21 8928    OBJECTIVE     Current Weight: Weight - Scale: 67 6 kg (149 lb 0 5 oz)  Vitals:    02/13/21 0926   BP: 117/75   Pulse:    Resp:    Temp:    SpO2:        Intake/Output Summary (Last 24 hours) at 2/13/2021 7523  Last data filed at 2/13/2021 0353  Gross per 24 hour   Intake 1270 ml   Output 1075 ml   Net 195 ml       PHYSICAL EXAMINATION     Physical Exam  Constitutional:       General: He is not in acute distress  Appearance: He is well-developed  HENT:      Head: Normocephalic  Eyes:      General: No scleral icterus  Conjunctiva/sclera: Conjunctivae normal    Neck:      Musculoskeletal: Neck supple  Vascular: No JVD  Cardiovascular:      Rate and Rhythm: Normal rate  Heart sounds: Normal heart sounds  Pulmonary:      Effort: Pulmonary effort is normal       Breath sounds: No wheezing  Abdominal:      Palpations: Abdomen is soft  Tenderness: There is no abdominal tenderness  Musculoskeletal: Normal range of motion  Skin:     General: Skin is warm  Findings: No rash  Neurological:      Mental Status: He is alert  He is disoriented            LAB RESULTS     Results from last 7 days   Lab Units 02/13/21  0657 02/12/21  0402 02/12/21  0401 02/11/21  0522 02/10/21  0521 02/09/21  1658   WBC Thousand/uL 8 01  --  7 98 6 95 6 69 14 60*   HEMOGLOBIN g/dL 7 5*  --  9 1* 7 8* 7 9* 10 1*   HEMATOCRIT % 23 8*  --  28 2* 24 4* 24 1* 30 6*   PLATELETS Thousands/uL 129*  --  154 131* 131* 216   POTASSIUM mmol/L 3 7 4 0  --  3 5 3 6 3 8   CHLORIDE mmol/L 112* 108  --  110* 110* 102   CO2 mmol/L 21 23  --  23 21 21   BUN mg/dL 43* 41*  --  43* 44* 49*   CREATININE mg/dL 4 74* 5 04*  --  5 30* 5 54* 6 08*   EGFR ml/min/1 73sq m 11 10  --  9 9 8   CALCIUM mg/dL 7 9* 8 2*  --  7 7* 7 9* 8 4   PHOSPHORUS mg/dL  --  3 6  --   --   --   --        RADIOLOGY RESULTS      Results for orders placed during the hospital encounter of 02/09/21   XR chest 1 view portable    Narrative CHEST     INDICATION:   fatigue  Patient has confirmed COVID-19  Positive on 1/24/2021  COMPARISON:  Chest radiograph from 1/28/2021 and chest CT from 1/24/2021  EXAM PERFORMED/VIEWS:  XR CHEST PORTABLE      FINDINGS:    Cardiomediastinal silhouette appears unremarkable  No acute disease  Resolution of previous pneumonia and effusions  No pneumothorax  Osseous structures appear within normal limits for patient age  Impression No acute cardiopulmonary disease  Workstation performed: [id number]       Results for orders placed during the hospital encounter of 04/21/20   XR chest pa & lateral    Narrative CHEST     INDICATION:   questionable aspiration event  COMPARISON:  One view chest 4/21/2020    EXAM PERFORMED/VIEWS:  XR CHEST PA & LATERAL      FINDINGS:    Normal cardiac silhouette  Aortic calcification is present  Minimal residual albeit decreased left basilar subsegmental atelectasis  No pneumothorax, pulmonary edema, or gross pleural effusion  Degenerative changes bilateral shoulders  Partially visualized left nephroureteral stent  Impression Minimal residual albeit decreased left basilar subsegmental atelectasis  No other significant interval change  Workstation performed: [id number]       Results for orders placed during the hospital encounter of 01/10/20   CT chest without contrast    Narrative CT CHEST WITHOUT IV CONTRAST    INDICATION:   rule out pneumonia      COMPARISON:  April 2019    TECHNIQUE: CT examination of the chest was performed without intravenous contrast   Axial, sagittal, and coronal 2D reformatted images were created from the source data and submitted for interpretation  Radiation dose length product (DLP) for this visit:  302 8 mGy-cm   This examination, like all CT scans performed in the [facility], was performed utilizing techniques to minimize radiation dose exposure, including the use of iterative   reconstruction and automated exposure control  FINDINGS:    LUNGS:  There is no acute pulmonary infiltrate or suspicious mass lesion  Single tiny, 3 x 2 mm janneth-fissural nodule noted in the left medial apex adjacent to major fissure appearing stable  There is no tracheal or endobronchial lesion  PLEURA:  Unremarkable  HEART/GREAT VESSELS:  Ascending aortic aneurysmal dilatation of 45 mm  Minimally increased compared to prior when measured in a similar fashion, 43 mm previously  Bovine arch configuration noted  Heavy calcified coronary artery atherosclerosis  MEDIASTINUM AND NEAL:  Unremarkable  CHEST WALL AND LOWER NECK:   Mild relative elevation of the left hemidiaphragm, stable compared to January 2019  VISUALIZED STRUCTURES IN THE UPPER ABDOMEN:  Percutaneous pigtail catheter noted, through the right lobe the liver, residing in the gallbladder       OSSEOUS STRUCTURES:  No acute fracture or destructive osseous lesion  Impression Lungs appear clear  No evidence for pneumonia  Ascending aortic aneurysmal dilatation as above  Heavy calcified coronary artery disease  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  Workstation performed: [id number]       No results found for this or any previous visit  Results for orders placed during the hospital encounter of 09/30/20   CT abdomen pelvis wo contrast    Narrative CT ABDOMEN AND PELVIS WITHOUT IV CONTRAST    INDICATION:   syncope with abdominal distention  COMPARISON:  None      TECHNIQUE:  CT examination of the abdomen and pelvis was performed without intravenous contrast   Axial, sagittal, and coronal 2D reformatted images were created from the source data and submitted for interpretation  Radiation dose length product (DLP) for this visit:  555 28 mGy-cm   This examination, like all CT scans performed in the [facility], was performed utilizing techniques to minimize radiation dose exposure, including the use of iterative   reconstruction and automated exposure control  Enteric contrast was administered  FINDINGS:    ABDOMEN    LOWER CHEST:  Small left and small-to-moderate right pleural effusion is increased  LIVER/BILIARY TREE:  Heterogeneous liver  Discrete known metastatic masses obscured due to lack of IV contrast     GALLBLADDER:  Gallbladder is surgically absent  SPLEEN:  Unremarkable  PANCREAS:  Unremarkable  ADRENAL GLANDS:  Unremarkable  KIDNEYS/URETERS:  Unremarkable  No hydronephrosis  STOMACH AND BOWEL:  Unremarkable  APPENDIX:  No findings to suggest appendicitis  ABDOMINOPELVIC CAVITY:  Trace perisplenic fluid is unchanged  Geanie Gibes No pneumoperitoneum  No lymphadenopathy  Calcified mass in the left pelvis in image 74 measuring 1 5 cm is unchanged  Soft tissue mass with calcification in the mesentery in the abdomen is obscured by adjacent bowel loops  It measures 6 3 x 2 7 cm  VESSELS:  Unremarkable for patient's age  PELVIS    REPRODUCTIVE ORGANS:  Prostate is enlarged measuring 5 3 x 5 3 cm  URINARY BLADDER:  Haile balloon in the bladder with decompressed bladder demonstrating wall thickening with maximum thickness of 1 4 cm  ABDOMINAL WALL/INGUINAL REGIONS:  Small inguinal hernias bilaterally with omental fat  OSSEOUS STRUCTURES:  No acute fracture or destructive osseous lesion  Impression 1  Increased pleural effusions bilaterally as described  2  Calcified masses in the abdomen as described and unchanged  3  Decompressed bladder with diffuse bladder wall thickening again seen  4  Resolution of prior bowel wall thickening    5  Known hepatic masses poorly appreciated due to lack of contrast     Workstation performed: [id number]       No results found for this or any previous visit  PLAN / RECOMMENDATIONS      CKD stage 5:  Seems to be stable  Patient does not want dialysis and like to stay comfortable    Altered mental status:  Seems to be stable though not very cooperative    Diabetes type 2:  Fluctuating glucose    Will continue monitor and will advised palliative care        Suyapa Hercules MD  Nephrology  2/13/2021        Portions of the record may have been created with voice recognition software  Occasional wrong word or "sound a like" substitutions may have occurred due to the inherent limitations of voice recognition software  Read the chart carefully and recognize, using context, where substitutions have occurred

## 2021-02-13 NOTE — CASE MANAGEMENT
Per [employer], [facility] is able to accept patient  SOC date is still pending  SLIM reported that patient may discharge tomorrow if his confusion improves  Cm informed [facility] of potential DC date  Cm department will continue to follow patient through discharge

## 2021-02-14 NOTE — PROGRESS NOTES
Progress Note - Obinna Jordan 1940, 79 y o  male MRN: [id number]    Unit/Bed#: -01 Encounter: [id number]    Primary Care Provider: RIKA Skelton   Date and time admitted to hospital: 2/9/2021  4:18 PM        Depression  Assessment & Plan  Patient looks depressed and agitated at time  He is oriented x 2 (place and person)  Psych consult  Continue seroquel      Anemia  Assessment & Plan  Hemoglobin 7 4  Multifactorial   Anemia of chronic kidney disease is one of the components  Continue to monitor  Will consider to transfuse if hemoglobin less than 7    UTI (urinary tract infection) due to urinary indwelling catheter [facility])  Assessment & Plan  UTI due to chronic indwelling Garcia catheter, as evidenced by UTI in a patient with a chronic indwelling garcia catheter, requiring IV antibiotics and possible change of catheter while inpatient  Continue meropenem   Urine culture growing Klebsiella oxytoca and Providencia rettgeri, Enterococcus faecalis  Blood culture negative x2 so far  Id consult      Moderate protein-calorie malnutrition ([address] )  Assessment & Plan  Malnutrition Findings:   Adult Malnutrition type: Chronic illness  Adult Degree of Malnutrition: Malnutrition of moderate degree    BMI Findings: Body mass index is 22 66 kg/m²  Nutrition consult, appreciate recommendation  Atrial fibrillation [facility])  Assessment & Plan  Fairly new onset atrial fibrillation  [id number] score 4 points  However he is not a candidate for anticoagulation given in the 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, fall risk, anemia, dementia  Rate has been controlled with metoprolol  Cardiology consult, appreciate recommendation  History of Clostridioides difficile infection  Assessment & Plan  Denies any fever, chills, nausea, vomiting and diarrhea  Currently on prophylaxis p o   Vanco    Acute kidney injury superimposed on CKD [facility])  Assessment & Plan  Lab Results   Component Value Date    EGFR 10 2021    EGFR 11 2021    EGFR 10 2021    CREATININE 4 92 (H) 2021    CREATININE 4 74 (H) 2021    CREATININE 5 04 (H) 2021     Baseline creatinine appears to be around 4 5  CKD stage 5 not on dialysis  Creatinine improving  nephro consult, recommended to give IV hydration in light of intravascular volume depletion  Avoid nephro toxins  Essential hypertension  Assessment & Plan  BP is reasonably controlled  Continue nifedipine and metoprolol      Type 2 diabetes mellitus with hyperglycemia, with long-term current use of insulin [facility])  Assessment & Plan  Lab Results   Component Value Date    HGBA1C 6 8 (H) 2020       Recent Labs     21  2050 21  0614 21  1101 21  1610   POCGLU 246* 108 241* 210*       Blood Sugar Average: Last 72 hrs:  (P) 183 8   Sugar at goal  Acute check and SSI  Currently on a regular diet as per Nutrition        VTE Pharmacologic Prophylaxis:   Pharmacologic: Heparin  Mechanical VTE Prophylaxis in Place: Yes    Discussions with Specialists or Other Care Team Provider: yes    Education and Discussions with Family / Patient: YES    Current Length of Stay: 5 day(s)    Current Patient Status: Inpatient     Discharge Plan / Estimated Discharge Date: Tomorrow    Code Status: Level 1 - Full Code      Subjective:   No new complaints  Patient is calm and cooperative  Still confused  No acute event overnight  Objective:     Vitals:   Temp (24hrs), Av 6 °F (37 °C), Min:98 2 °F (36 8 °C), Max:99 °F (37 2 °C)    Temp:  [98 2 °F (36 8 °C)-99 °F (37 2 °C)] 99 °F (37 2 °C)  HR:  [98-99] 98  Resp:  [18] 18  BP: (137-152)/(64-80) 137/64  SpO2:  [96 %] 96 %  Body mass index is 22 66 kg/m²  Input and Output Summary (last 24 hours):        Intake/Output Summary (Last 24 hours) at 2021 1635  Last data filed at 2021 1200  Gross per 24 hour   Intake 720 ml   Output 750 ml   Net -30 ml       Physical Exam: Physical Exam  Vitals signs and nursing note reviewed  Constitutional:       General: He is not in acute distress  Appearance: He is ill-appearing (Chronically)  He is not toxic-appearing or diaphoretic  HENT:      Head: Normocephalic and atraumatic  Eyes:      General: No scleral icterus  Conjunctiva/sclera: Conjunctivae normal    Neck:      Musculoskeletal: Normal range of motion  Cardiovascular:      Rate and Rhythm: Normal rate  Rhythm irregular  Pulses: Normal pulses  Heart sounds: Normal heart sounds  No murmur  No friction rub  Pulmonary:      Effort: Pulmonary effort is normal  No respiratory distress  Breath sounds: Normal breath sounds  No stridor  No wheezing or rhonchi  Abdominal:      General: Abdomen is flat  Bowel sounds are normal  There is no distension  Palpations: Abdomen is soft  Tenderness: There is no abdominal tenderness  There is no guarding  Genitourinary:     Comments: Indwelling urinary catheter in placed  Urine looks clean, straw color  Musculoskeletal: Normal range of motion  General: No swelling or tenderness  Right lower leg: No edema  Left lower leg: No edema  Skin:     General: Skin is warm and dry  Capillary Refill: Capillary refill takes less than 2 seconds  Coloration: Skin is not pale  Findings: No rash  Neurological:      Mental Status: He is alert  Motor: Weakness ( generalized) present  Comments: AAO x 2  Place and person     Psychiatric:      Comments: Calm              Additional Data:     Labs:    Results from last 7 days   Lab Units 02/14/21  1133   WBC Thousand/uL 7 29   HEMOGLOBIN g/dL 7 4*   HEMATOCRIT % 22 8*   PLATELETS Thousands/uL 114*   NEUTROS PCT % 71   LYMPHS PCT % 17   MONOS PCT % 9   EOS PCT % 2     Results from last 7 days   Lab Units 02/14/21  1133  02/09/21  1658   POTASSIUM mmol/L 4 0   < > 3 8   CHLORIDE mmol/L 110*   < > 102   CO2 mmol/L 21   < > 21   BUN mg/dL 45*   < > 49*   CREATININE mg/dL 4 92*   < > 6 08*   CALCIUM mg/dL 8 1*   < > 8 4   ALK PHOS U/L  --   --  403*   ALT U/L  --   --  77   AST U/L  --   --  40    < > = values in this interval not displayed  Results from last 7 days   Lab Units 02/12/21  0357   INR  1 05       * I Have Reviewed All Lab Data Listed Above  * Additional Pertinent Lab Tests Reviewed: Laura [facility] Admission Reviewed      Recent Cultures (last 7 days):     Results from last 7 days   Lab Units 02/09/21  1737 02/09/21  1721 02/09/21  1713   BLOOD CULTURE   --  No Growth After 4 Days  No Growth After 4 Days  URINE CULTURE  >100,000 cfu/ml Klebsiella oxytoca ESBL*  >100,000 cfu/ml Providencia rettgeri*  >100,000 cfu/ml Enterococcus faecalis*  --   --        Last 24 Hours Medication List:   Current Facility-Administered Medications   Medication Dose Route Frequency Provider Last Rate    acetaminophen  650 mg Oral Q6H PRN Kaz Sarmiento MD      aspirin  81 mg Oral Daily Best BAUTISTA MD      heparin (porcine)  5,000 Units Subcutaneous Q8H [address] Best BAUTISTA MD      insulin lispro  1-5 Units Subcutaneous 4x Daily (AC & HS) Claudia Desouza PA-C      meropenem  500 mg Intravenous Q12H Kenny Gómez  mg (02/14/21 1450)    metoprolol tartrate  12 5 mg Oral Q12H [address] Marlene Niño MD      NIFEdipine ER  60 mg Oral Daily Best BAUTISTA MD      OLANZapine  2 5 mg Intramuscular BID PRN Kenny Gómez MD      pantoprazole  40 mg Oral Daily Best BAUTISTA MD      QUEtiapine  12 5 mg Oral HS Mukesh Butts MD      sodium bicarbonate  325 mg Oral BID Kaz Sarmiento MD      tamsulosin  0 4 mg Oral Daily With Dinner Kaz Sarmiento MD      vancomycin  125 mg Oral Q12H [address] Kaz Sarmiento MD          Today, Patient Was Seen By: Kenny Gómez MD    ** Please Note: This note has been constructed using a voice recognition system   **

## 2021-02-14 NOTE — PROGRESS NOTES
NEPHROLOGY PROGRESS NOTE    Patient: Jerry Rosenbaum               Sex: male          DOA: 2/9/2021  4:18 PM   YOB: 1940        Age:  79 y o         LOS:  LOS: 5 days       HPI     Patient with stage 5 CKD and altered mental status    SUBJECTIVE     Not much change in patient condition      Remained confused and not cooperative    Does not appear to be combative    Denies any complaint    CURRENT MEDICATIONS       Current Facility-Administered Medications:     acetaminophen (TYLENOL) tablet 650 mg, 650 mg, Oral, Q6H PRN, Zac BAUTISTA MD    aspirin chewable tablet 81 mg, 81 mg, Oral, Daily, Best BAUTISTA MD, 81 mg at 02/14/21 0850    heparin (porcine) subcutaneous injection 5,000 Units, 5,000 Units, Subcutaneous, Q8H [address], Best BAUTISTA MD, 5,000 Units at 02/14/21 0531    insulin lispro (HumaLOG) 100 units/mL subcutaneous injection 1-5 Units, 1-5 Units, Subcutaneous, 4x Daily (AC & HS), 2 Units at 02/13/21 2052 **AND** Fingerstick Glucose (POCT), , , 4x Daily AC and at bedtime, Mario Diego PA-C    meropenem (MERREM) 500 mg in sodium chloride 0 9 % 50 mL IVPB, 500 mg, Intravenous, Q12H, Mukesh Butts MD, Last Rate: 100 mL/hr at 02/14/21 0344, 500 mg at 02/14/21 0344    metoprolol tartrate (LOPRESSOR) partial tablet 12 5 mg, 12 5 mg, Oral, Q12H [address], Bernarda Pantoja MD, 12 5 mg at 02/14/21 0850    NIFEdipine (PROCARDIA XL) 24 hr tablet 60 mg, 60 mg, Oral, Daily, Best BAUTISTA MD, 60 mg at 02/14/21 0850    OLANZapine (ZyPREXA) IM injection 2 5 mg, 2 5 mg, Intramuscular, BID PRN, Sadi Andrade MD, 2 5 mg at 02/12/21 0059    pantoprazole (PROTONIX) EC tablet 40 mg, 40 mg, Oral, Daily, Best BAUTISTA MD, 40 mg at 02/14/21 0851    QUEtiapine (SEROquel) tablet 12 5 mg, 12 5 mg, Oral, HS, Mukesh Butts MD, 12 5 mg at 02/13/21 2053    sodium bicarbonate tablet 325 mg, 325 mg, Oral, BID, Best BAUTISTA MD, 325 mg at 02/14/21 0850    tamsulosin (FLOMAX) capsule 0 4 mg, 0 4 mg, Oral, Daily With Dinner, Baron Gita MD, 0 4 mg at 02/13/21 1724    vancomycin (VANCOCIN) oral solution 125 mg, 125 mg, Oral, Q12H [address], Best BAUTISTA MD, 125 mg at 02/14/21 0849    OBJECTIVE     Current Weight: Weight - Scale: 67 6 kg (149 lb 0 5 oz)  Vitals:    02/14/21 0700   BP: 147/80   Pulse: 98   Resp: 18   Temp: 98 2 °F (36 8 °C)   SpO2: 96%       Intake/Output Summary (Last 24 hours) at 2/14/2021 0951  Last data filed at 2/14/2021 0454  Gross per 24 hour   Intake 1320 ml   Output 300 ml   Net 1020 ml       PHYSICAL EXAMINATION     Physical Exam  Constitutional:       General: He is not in acute distress  Appearance: He is well-developed  HENT:      Head: Normocephalic  Eyes:      General: No scleral icterus  Conjunctiva/sclera: Conjunctivae normal    Neck:      Musculoskeletal: Neck supple  Vascular: No JVD  Cardiovascular:      Rate and Rhythm: Normal rate  Heart sounds: Normal heart sounds  Pulmonary:      Effort: Pulmonary effort is normal       Breath sounds: No wheezing  Abdominal:      Palpations: Abdomen is soft  Tenderness: There is no abdominal tenderness  Musculoskeletal: Normal range of motion  Skin:     General: Skin is warm  Findings: No rash  Neurological:      Mental Status: He is alert and oriented to person, place, and time     Psychiatric:         Behavior: Behavior normal           LAB RESULTS     Results from last 7 days   Lab Units 02/13/21  0657 02/12/21  0402 02/12/21  0401 02/11/21  0522 02/10/21  0521 02/09/21  1658   WBC Thousand/uL 8 01  --  7 98 6 95 6 69 14 60*   HEMOGLOBIN g/dL 7 5*  --  9 1* 7 8* 7 9* 10 1*   HEMATOCRIT % 23 8*  --  28 2* 24 4* 24 1* 30 6*   PLATELETS Thousands/uL 129*  --  154 131* 131* 216   POTASSIUM mmol/L 3 7 4 0  --  3 5 3 6 3 8   CHLORIDE mmol/L 112* 108  --  110* 110* 102   CO2 mmol/L 21 23  --  23 21 21   BUN mg/dL 43* 41*  --  43* 44* 49*   CREATININE mg/dL 4 74* 5 04*  --  5 30* 5 54* 6 08*   EGFR ml/min/1 73sq m 11 10  -- 9 9 8   CALCIUM mg/dL 7 9* 8 2*  --  7 7* 7 9* 8 4   PHOSPHORUS mg/dL  --  3 6  --   --   --   --        RADIOLOGY RESULTS      Results for orders placed during the hospital encounter of 02/09/21   XR chest 1 view portable    Narrative CHEST     INDICATION:   fatigue  Patient has confirmed COVID-19  Positive on 1/24/2021  COMPARISON:  Chest radiograph from 1/28/2021 and chest CT from 1/24/2021  EXAM PERFORMED/VIEWS:  XR CHEST PORTABLE      FINDINGS:    Cardiomediastinal silhouette appears unremarkable  No acute disease  Resolution of previous pneumonia and effusions  No pneumothorax  Osseous structures appear within normal limits for patient age  Impression No acute cardiopulmonary disease  Workstation performed: [id number]       Results for orders placed during the hospital encounter of 04/21/20   XR chest pa & lateral    Narrative CHEST     INDICATION:   questionable aspiration event  COMPARISON:  One view chest 4/21/2020    EXAM PERFORMED/VIEWS:  XR CHEST PA & LATERAL      FINDINGS:    Normal cardiac silhouette  Aortic calcification is present  Minimal residual albeit decreased left basilar subsegmental atelectasis  No pneumothorax, pulmonary edema, or gross pleural effusion  Degenerative changes bilateral shoulders  Partially visualized left nephroureteral stent  Impression Minimal residual albeit decreased left basilar subsegmental atelectasis  No other significant interval change  Workstation performed: [id number]       Results for orders placed during the hospital encounter of 01/10/20   CT chest without contrast    Narrative CT CHEST WITHOUT IV CONTRAST    INDICATION:   rule out pneumonia  COMPARISON:  April 2019    TECHNIQUE: CT examination of the chest was performed without intravenous contrast   Axial, sagittal, and coronal 2D reformatted images were created from the source data and submitted for interpretation      Radiation dose length product (DLP) for this visit:  302 8 mGy-cm   This examination, like all CT scans performed in the [facility], was performed utilizing techniques to minimize radiation dose exposure, including the use of iterative   reconstruction and automated exposure control  FINDINGS:    LUNGS:  There is no acute pulmonary infiltrate or suspicious mass lesion  Single tiny, 3 x 2 mm janneth-fissural nodule noted in the left medial apex adjacent to major fissure appearing stable  There is no tracheal or endobronchial lesion  PLEURA:  Unremarkable  HEART/GREAT VESSELS:  Ascending aortic aneurysmal dilatation of 45 mm  Minimally increased compared to prior when measured in a similar fashion, 43 mm previously  Bovine arch configuration noted  Heavy calcified coronary artery atherosclerosis  MEDIASTINUM AND NEAL:  Unremarkable  CHEST WALL AND LOWER NECK:   Mild relative elevation of the left hemidiaphragm, stable compared to January 2019  VISUALIZED STRUCTURES IN THE UPPER ABDOMEN:  Percutaneous pigtail catheter noted, through the right lobe the liver, residing in the gallbladder       OSSEOUS STRUCTURES:  No acute fracture or destructive osseous lesion  Impression Lungs appear clear  No evidence for pneumonia  Ascending aortic aneurysmal dilatation as above  Heavy calcified coronary artery disease  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  Workstation performed: [id number]       No results found for this or any previous visit  Results for orders placed during the hospital encounter of 09/30/20   CT abdomen pelvis wo contrast    Narrative CT ABDOMEN AND PELVIS WITHOUT IV CONTRAST    INDICATION:   syncope with abdominal distention  COMPARISON:  None      TECHNIQUE:  CT examination of the abdomen and pelvis was performed without intravenous contrast   Axial, sagittal, and coronal 2D reformatted images were created from the source data and submitted for interpretation  Radiation dose length product (DLP) for this visit:  555 28 mGy-cm   This examination, like all CT scans performed in the [facility], was performed utilizing techniques to minimize radiation dose exposure, including the use of iterative   reconstruction and automated exposure control  Enteric contrast was administered  FINDINGS:    ABDOMEN    LOWER CHEST:  Small left and small-to-moderate right pleural effusion is increased  LIVER/BILIARY TREE:  Heterogeneous liver  Discrete known metastatic masses obscured due to lack of IV contrast     GALLBLADDER:  Gallbladder is surgically absent  SPLEEN:  Unremarkable  PANCREAS:  Unremarkable  ADRENAL GLANDS:  Unremarkable  KIDNEYS/URETERS:  Unremarkable  No hydronephrosis  STOMACH AND BOWEL:  Unremarkable  APPENDIX:  No findings to suggest appendicitis  ABDOMINOPELVIC CAVITY:  Trace perisplenic fluid is unchanged  Bharti Console No pneumoperitoneum  No lymphadenopathy  Calcified mass in the left pelvis in image 74 measuring 1 5 cm is unchanged  Soft tissue mass with calcification in the mesentery in the abdomen is obscured by adjacent bowel loops  It measures 6 3 x 2 7 cm  VESSELS:  Unremarkable for patient's age  PELVIS    REPRODUCTIVE ORGANS:  Prostate is enlarged measuring 5 3 x 5 3 cm  URINARY BLADDER:  Haile balloon in the bladder with decompressed bladder demonstrating wall thickening with maximum thickness of 1 4 cm  ABDOMINAL WALL/INGUINAL REGIONS:  Small inguinal hernias bilaterally with omental fat  OSSEOUS STRUCTURES:  No acute fracture or destructive osseous lesion  Impression 1  Increased pleural effusions bilaterally as described  2  Calcified masses in the abdomen as described and unchanged  3  Decompressed bladder with diffuse bladder wall thickening again seen  4  Resolution of prior bowel wall thickening    5  Known hepatic masses poorly appreciated due to lack of contrast     Workstation performed: [id number]       No results found for this or any previous visit  PLAN / RECOMMENDATIONS      CKD stage 5:  Seems to be stable  Does not want dialysis  Do not have any blood work today  Altered mental status:  Seems to be dementia and at baseline    Diabetes type 2:  Reasonably well control    Will continue to monitor    Roxana Friend MD  Nephrology  2/14/2021        Portions of the record may have been created with voice recognition software  Occasional wrong word or "sound a like" substitutions may have occurred due to the inherent limitations of voice recognition software  Read the chart carefully and recognize, using context, where substitutions have occurred

## 2021-02-15 PROBLEM — R41.0 DELIRIUM: Status: ACTIVE | Noted: 2021-01-01

## 2021-02-15 NOTE — DISCHARGE SUMMARY
Discharge- Anton Vera 1940, 79 y o  male MRN: [id number]    Unit/Bed#: -01 Encounter: [id number]    Primary Care Provider: RIKA Armstrong   Date and time admitted to hospital: 2/9/2021  4:18 PM        * Generalized weakness  Assessment & Plan  Patient was recommended for STR on prior hospitalization  However, patient was discharged with Home health as his choice of SNF did not have available bed  PT/OT consult  CM consult  Discussed with the patient and significant other  Family wants him to discharge home  UTI (urinary tract infection) due to urinary indwelling catheter [facility])  Assessment & Plan  UTI due to chronic indwelling Garcia catheter, as evidenced by UTI in a patient with a chronic indwelling garcia catheter, requiring IV antibiotics and possible change of catheter while inpatient  Urine culture growing Klebsiella oxytoca and Providencia rettgeri, Enterococcus faecalis  Blood culture negative x2 so far  ID consult, appreciate recommendations  Id recommended to discontinue meropenum  Delirium  Assessment & Plan  Patient looks depressed and agitated at time  He was given Zyprexa IM as needed and switched to Seroquel at night  He is oriented x 2 (place and person)  Psych consult, diagnosed as delirium  Id consult for UTI and ID recommended to discontinue meropenem for his delirium  Anemia  Assessment & Plan  Hemoglobin 7 4  Multifactorial   Anemia of chronic kidney disease is one of the components  Follow-up with CBC in 3 days  Moderate protein-calorie malnutrition ([address] )  Assessment & Plan  Malnutrition Findings:   Adult Malnutrition type: Chronic illness  Adult Degree of Malnutrition: Malnutrition of moderate degree    BMI Findings: Body mass index is 22 66 kg/m²  Nutrition consult, appreciate recommendation  Atrial fibrillation [facility])  Assessment & Plan  Fairly new onset atrial fibrillation  [id number] score 4 points    However he is not a candidate for anticoagulation given in the 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, fall risk, anemia, dementia  Rate has been controlled with metoprolol  Cardiology consult, appreciate recommendation  History of Clostridioides difficile infection  Assessment & Plan  Denies any fever, chills, nausea, vomiting and diarrhea  Id consult, appreciate recommendations  Recommended p o  Vancomycin for 3 more days  Acute kidney injury superimposed on CKD [facility])  Assessment & Plan  Lab Results   Component Value Date    EGFR 10 02/14/2021    EGFR 11 02/13/2021    EGFR 10 02/12/2021    CREATININE 4 92 (H) 02/14/2021    CREATININE 4 74 (H) 02/13/2021    CREATININE 5 04 (H) 02/12/2021     Baseline creatinine appears to be around 4 5  CKD stage 5 not on dialysis  Creatinine improving  nephro consult, recommended to give IV hydration in light of intravascular volume depletion  Avoid nephro toxins  Follow-up with Nephrology as an outpatient  Elevated d-dimer  Assessment & Plan  D-dimer 2 71  Troponin negative  Denies any chest pain, shortness of breath, palpitation, leg swelling  VTE unlikely according to age-adjusted d dimer  Patient does have a history of Covid recently on 1/24/2021      Essential hypertension  Assessment & Plan  BP is reasonably controlled  Continue nifedipine and metoprolol      Type 2 diabetes mellitus with hyperglycemia, with long-term current use of insulin [facility])  Assessment & Plan  Lab Results   Component Value Date    HGBA1C 6 8 (H) 09/21/2020       Recent Labs     02/14/21  1610 02/14/21  2050 02/15/21  0640 02/15/21  1058   POCGLU 210* 292* 138 203*       Blood Sugar Average: Last 72 hrs:  (P) 011 [id number]   Continue home medications    Discharging Physician / Practitioner: Tierra Vaughn MD  PCP: Afshin Perry, [address]  Admission Date:   Admission Orders (From admission, onward)     Ordered        02/09/21 [address]  Inpatient Admission  Once                   Discharge Date: 02/15/21    Resolved Problems  Date Reviewed: 2/15/2021          Resolved    Sepsis ([address] ) 2/11/2021     Resolved by  Dutch Pepe MD          Consultations During Hospital Stay:  · ID, Nephrology, Cardiology and Psychiatry, PT/OT    Procedures Performed:   · None    Significant Findings / Test Results:   XR chest 1 view portable   Final Result by Artem Ramey MD (02/09 1706)      No acute cardiopulmonary disease  Workstation performed: [id number]         ·     Incidental Findings:   · As above    Test Results Pending at Discharge (will require follow up): · None     Outpatient Tests Requested:  · CBC, BMP    Complications:  None    Reason for Admission:  Generalized weakness    Hospital Course:     Anton Vera is a 79 y o  male patient with recent hospitalization due to COVID-19 pneumonia at 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 he was not seen by VNA suspected due to weather conditions and was seen by [facility] today and he found to be generally fatigued and lethargic the point where he had an episode of syncope when attempted to move him to chair  Poor historian  Denies chest pain, dyspnea, fever, chills, any other complaints  He was evaluated by Cardiology for atrial fibrillation and recommended to initiate metoprolol  Urine culture grew Proteus, Klebsiella and Enterococcus faecalis  Patient was treated with IV meropenam for UTI  oral vanco for C diff prophylaxis    He was consulted for Psychiatry for possible depression however it was considered as delirium  ID consulted and recommended to discontinue IV meropenum in the setting of delirium  He was also evaluated by PT and OT for his generalized weakness and recommended acute post rehab  However the family refused to go to rehab  Patient was discharged home with home health care  He was recommended to follow up with primary care physician, Nephrology as an outpatient  He was hemodynamically and clinically stable before discharge  Please see above list of diagnoses and related plan for additional information  Condition at Discharge: stable     Discharge Day Visit / Exam:     Subjective:  Patient was seen and examined at the bedside  He is eager to go home  He reports he is feeling better  no new complaints  Vitals: Blood Pressure: 141/65 (02/14/21 2125)  Pulse: 89 (02/14/21 2125)  Temperature: 99 3 °F (37 4 °C) (02/14/21 2125)  Temp Source: Oral (02/14/21 1529)  Respirations: 18 (02/14/21 2125)  Height: 5' 8" (172 7 cm) (02/10/21 1040)  Weight - Scale: 67 6 kg (149 lb 0 5 oz) (02/09/21 1629)  SpO2: 96 % (02/14/21 2125)  Exam:   Physical Exam  Vitals signs and nursing note reviewed  Constitutional:       General: He is not in acute distress  Appearance: He is ill-appearing  HENT:      Head: Normocephalic and atraumatic  Mouth/Throat:      Mouth: Mucous membranes are moist       Pharynx: Oropharynx is clear  Eyes:      General: No scleral icterus  Conjunctiva/sclera: Conjunctivae normal    Cardiovascular:      Rate and Rhythm: Normal rate  Rhythm irregular  Pulses: Normal pulses  Heart sounds: Normal heart sounds  No murmur  Pulmonary:      Effort: Pulmonary effort is normal  No respiratory distress  Breath sounds: Normal breath sounds  No wheezing  Abdominal:      General: Abdomen is flat  Bowel sounds are normal  There is no distension  Palpations: Abdomen is soft  Tenderness: There is no abdominal tenderness  Musculoskeletal:      Right lower leg: No edema  Left lower leg: No edema  Skin:     General: Skin is warm and dry  Capillary Refill: Capillary refill takes less than 2 seconds  Neurological:      Mental Status: He is alert  Comments: AAO x2  Discussion with Family:  Discussed with the patient and his significant other  Discharge instructions/Information to patient and family:   See after visit summary for information provided to patient and family  Provisions for Follow-Up Care:  See after visit summary for information related to follow-up care and any pertinent home health orders  Disposition:     Home with VNA Services (Reminder: Complete face to face encounter)    For Discharges to [facility] SNF:   · Not Applicable to this Patient - Not Applicable to this Patient    Planned Readmission: no     Discharge Statement:  I spent 30 minutes discharging the patient  This time was spent on the day of discharge  I had direct contact with the patient on the day of discharge  Greater than 50% of the total time was spent examining patient, answering all patient questions, arranging and discussing plan of care with patient as well as directly providing post-discharge instructions  Additional time then spent on discharge activities  Discharge Medications:  See after visit summary for reconciled discharge medications provided to patient and family        ** Please Note: This note has been constructed using a voice recognition system **

## 2021-02-15 NOTE — DISCHARGE INSTR - AVS FIRST PAGE
Follow-up with primary care physician within 1 week after hospital discharge in regards of your recent hospitalization  Follow-up with your nephrologist within 1 week after hospital discharge in regards of your recent hospitalization  New medications:  Vancomycin 125 mg 2 times a day for 3 days  Metoprolol tartrate 12 5 mg 2 times a day for irregular rhythm of your heart  Continue taking home medications as previously  Get blood tests CBC, BMP in 2-3 days after hospital discharge  Return to the ED if symptoms recur or worsen

## 2021-02-15 NOTE — CASE MANAGEMENT
CM phoned and spoke to CESARKimCyndee who confirmed she is aware patient is cleared for discharge and she is currently driving to transport patient home  Raya Koo states she will be here to transport patient at 2:30PM   Raya Koo is informed of IMM and IMM is in the chart  Patient, [facility] and   Treatment team informed

## 2021-02-15 NOTE — CONSULTS
Consultation - Infectious Disease   Radha Lugo 79 y o  male MRN: [id number]  Unit/Bed#: -01 Encounter: [id number]      IMPRESSION & RECOMMENDATIONS:   1  Weakness and fatigue and leukocytosis  Patient initially presented with progressive weakness along with lethargy and fatigue  He was noted on admission to have elevated lactate and leukocytosis  These findings however resolved abruptly with fluid hydration  Patient may have had a urinary tract infection however he remains overall limited despite adequate antibiotic therapy  Higher suspicion for overall inability to care for himself and possible cognitive decline  Will discontinue further systemic antibiotic as below  Continue oral vancomycin prophylaxis as below  Continue to trend fever curve/vitals  Repeat CBC/chemistry tomorrow  Monitor stool output  Monitor urine output  Continue to monitor mental status  Recommend discussion of care needs with patient's family  Additional supportive care as per primary     2  Abnormal UA and possible complicated urinary tract infection  Patient noted to have abnormal UA with chronic catheter  He presented with very vague symptoms on admission  Patient may have had a urinary tract infection  Suspicion however as above for noninfectious etiology to presentation  He has however received adequate treatment based on his cultures, 3 days IV  Antibiotic may also be contributing to 6  No further meropenem  Continue oral vancomycin as below for prophylaxis  Monitor urine output  Haile catheter as per primary  Continue to trend fever curve/WBC    3  Acute kidney injury on Chronic kidney disease  Patient noted with acute elevation in his creatinine from baseline  I suspect that this largely baby volume related given the above  Creatinine improving  No further antibiotics as above  Continue monitor creatinine  Nephrology evaluation ongoing  Fluid hydration as per primary    4  Metastatic neuroendocrine tumor  Patient with known history of disease  Continue close follow-up with Oncology as outpatient  5  Type 2 diabetes  Ongoing glucose management as per primary  6  Depressed affect and episodes of confusion  Patient over admission noted to be a poor historian but then also having episodes of crying/depressed affect  Suspicion for underlying cognitive dysfunction  Unfortunately antibiotics likely meropenem can worsen depressive states/confusion  Adequate therapy achieved as above  Will stop further meropenem  Continue to monitor mood/cognition  Low threshold for CT imaging of the head  Recommend follow-up with psychiatry/Geriatrics as outpatient    7  History of C diff  Patient noted to be colonized with C diff in the past   Unfortunately he has been receiving systemic antibiotic which can increase risk of active infection  Significant stool output documented  Continue oral vancomycin prophylaxis for additional 72 hours  Monitor stool output closely  No further antibiotics as above    Above plan discussed briefly with the patient at bedside  Above plan discussed in detail with primary service resident  ID consult service will continue to follow  HISTORY OF PRESENT ILLNESS:  Reason for Consult:  Urinary tract infection    HPI: Juancho Paredes is a 79y o  year old male with chronic kidney disease, hypertension, neuroendocrine tumor with metastatic disease, hyperlipidemia and chronic catheter documented  Previous ID evaluations reviewed and patient has had prior bacteremia thought to be from a urinary source  Patient presented on 02/09 with generalized weakness and syncopal episode at home  Overall he was only able to provide very limited history  Patient was found by home health aide to be unwell at home and lethargic  No other localizing symptoms were noted  Was noted with a white count of 14 6  Creatinine 6 0  Lactic acidosis noted  LFTs unremarkable  Chest x-ray unremarkable    Given patient's vague symptoms he was thought to have a possible urinary tract infection and so was started on antibiotic  Wound care evaluation noted  Patient was also seen by Cardiology  Patient seemed to remain otherwise clinically stable  With fluid hydration is creatinine and white count seem to down trend  Patient was seen by Nephrology  He had been continued on antibiotic which was adjusted based on culture data  He was also seen by Psychiatry for episodes of depressive symptoms and what appeared to be some underlying cognitive dysfunction  No other acute events were noted  Currently afebrile and without leukocytosis  Urine cultures reviewed  Blood cultures without growth  Imaging reviewed  Patient's other vitals are stable  Previous ID evaluations and culture data reviewed  Previous lab data reviewed in Care everywhere  We are consulted at this time for further assistance in management  On evaluation the patient reports that his girlfriend is the only person that helps to take care of him and that his children are not involved  He currently denies having any nausea, vomiting, chest pain or shortness of breath  He denies having any lower abdominal discomfort  Patient is unsure how long he has been in the hospital or why he ended up in the hospital in the 1st place  He denies having any pain  He is hopeful to go home but he is very unclear in terms of care provided at home  REVIEW OF SYSTEMS:  A complete 12 point system-based review of systems is negative other than that noted in the HPI      PAST MEDICAL HISTORY:  Past Medical History:   Diagnosis Date    Acute pancreatitis without infection or necrosis 9/26/2019    Age-related nuclear cataract, right 11/5/2020    Anemia of chronic renal failure     BPH (benign prostatic hyperplasia)     CKD (chronic kidney disease) stage 5, GFR less than 15 ml/min (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)     Pressure injury of skin     Proteinuria     Sepsis ([address] ) 3/11/2020    Thrombophlebitis migrans     Type 2 diabetes mellitus      Past Surgical History:   Procedure Laterality Date    CATARACT EXTRACTION Right 11/05/2020    CATARACT EXTRACTION W/ INTRAOCULAR LENS IMPLANT Right 11/5/2020    Procedure: EYE OD CATARACT EXTRACTION WITH IOL INSERTION;  Surgeon: Silvia Koo MD;  Location: [facility] MAIN OR;  Service: Ophthalmology    CHOLECYSTECTOMY     Lurena Misael N/A 2/7/2020    Procedure: CHOLECYSTECTOMY LAPAROSCOPIC;  Surgeon:  Dustin De Anda MD;  Location: [facility] MAIN OR;  Service: General    CORONARY ANGIOPLASTY WITH STENT PLACEMENT      FL RETROGRADE PYELOGRAM  5/21/2020    IR BIOPSY LIVER MASS  1/24/2019    IR CHOLECYSTOSTOMY TUBE PLACEMENT  9/6/2019    IR NON-TUNNELED CENTRAL LINE PLACEMENT  1/28/2021    MT CYSTO/URETERO W/LITHOTRIPSY &INDWELL STENT INSRT Left 5/21/2020    Procedure: CYSTOSCOPY URETEROSCOPY WITH LITHOTRIPSY HOLMIUM LASER, RETROGRADE PYELOGRAM AND INSERTION STENT URETERAL;  Surgeon: Lino Haas MD;  Location: [facility] MA[facility] OR;  Service: Urology    MT CYSTOURETHROSCOPY,URETER CATHETER Left 4/21/2020    Procedure: CYSTOSCOPY WITH INSERTION STENT URETERAL;  Surgeon: Hiro King MD;  [facility] Main [facility];  Service: Urology       FAMILY HISTORY:  Non-contributory    SOCIAL HISTORY:  Social History   Social History     Substance and Sexual Activity   Alcohol Use Never    Alcohol/week: 0 0 standard drinks    Frequency: Never     Social History     Substance and Sexual Activity   Drug Use Never     Social History     Tobacco Use   Smoking Status Never Smoker   Smokeless Tobacco Never Used ALLERGIES:  No Known Allergies    MEDICATIONS:  All current active medications have been reviewed  PHYSICAL EXAM:  Temp:  [99 °F (37 2 °C)-99 3 °F (37 4 °C)] 99 3 °F (37 4 °C)  HR:  [89] 89  Resp:  [18] 18  BP: (137-141)/(64-65) 141/65  SpO2:  [96 %] 96 %  Temp (24hrs), Av 2 °F (37 3 °C), Min:99 °F (37 2 °C), Max:99 3 °F (37 4 °C)  Current: Temperature: 99 3 °F (37 4 °C)    Intake/Output Summary (Last 24 hours) at 2/15/2021 1123  Last data filed at 2/15/2021 0900  Gross per 24 hour   Intake 150 ml   Output 1525 ml   Net -1375 ml       General Appearance:  Chronically ill-appearing and cachectic, nontoxic, and in no distress   Head:  Normocephalic, without obvious abnormality, atraumatic   Eyes:  Conjunctiva pink and sclera anicteric, both eyes   Nose: Nares normal, mucosa normal, no drainage   Throat: Oropharynx moist without lesions; poor dentition noted  Neck: Supple, symmetrical, no adenopathy, no tenderness/mass/nodules   Back:   Unable to turn patient myself at this time  Images reviewed from wound care evaluation  Lungs:   Clear to auscultation bilaterally, respirations unlabored on room   Chest Wall:  No tenderness or deformity   Heart:  RRR; no murmur, rub or gallop appreciated   Abdomen:   Soft, non-tender, non-distended, positive bowel sounds; Haile catheter in place draining clear yellow urine   Extremities: No cyanosis, clubbing or edema   Skin: No rashes or lesions  No draining wounds noted  Lymph nodes: Cervical, supraclavicular nodes normal   Neurologic: Patient is oriented to person and partially to place but not to time  He is freely moving all of his extremities against gravity  He seems to have an overall depressed affect with episodes of inappropriate laughter  LABS, IMAGING, & OTHER STUDIES:  Lab Results:  I have personally reviewed pertinent labs    Results from last 7 days   Lab Units 21  1133 21  0657 21  0401   WBC Thousand/uL 7 29 8 01 7 98 HEMOGLOBIN g/dL 7 4* 7 5* 9 1*   PLATELETS Thousands/uL 114* 129* 154     Results from last 7 days   Lab Units 02/14/21  1133  02/09/21  1658   POTASSIUM mmol/L 4 0   < > 3 8   CHLORIDE mmol/L 110*   < > 102   CO2 mmol/L 21   < > 21   BUN mg/dL 45*   < > 49*   CREATININE mg/dL 4 92*   < > 6 08*   EGFR ml/min/1 73sq m 10   < > 8   CALCIUM mg/dL 8 1*   < > 8 4   AST U/L  --   --  40   ALT U/L  --   --  77   ALK PHOS U/L  --   --  403*    < > = values in this interval not displayed  Results from last 7 days   Lab Units 02/09/21  1737 02/09/21  1721 02/09/21  1713   BLOOD CULTURE   --  No Growth After 5 Days  No Growth After 5 Days  URINE CULTURE  >100,000 cfu/ml Klebsiella oxytoca ESBL*  >100,000 cfu/ml Providencia rettgeri*  >100,000 cfu/ml Enterococcus faecalis*  --   --        Imaging Studies:   I have personally reviewed pertinent imaging study reports and images in PACS  Other Studies:   I have personally reviewed pertinent reports

## 2021-02-16 NOTE — UTILIZATION REVIEW
Notification of Discharge  This is a Notification of Discharge from our facility [address]  Please be advised that this patient has been discharge from our facility  Below you will find the admission and discharge date and time including the patients disposition  PRESENTATION DATE: 2/9/2021  4:18 PM  OBS ADMISSION DATE:   IP ADMISSION DATE: 2/9/21 1842   DISCHARGE DATE: 2/15/2021  2:30 PM  [facility]   Admission Orders listed below:  Admission Orders (From admission, onward)     Ordered        02/09/21 1842  Inpatient Admission  Once                   Please contact the UR Department if additional information is required to close this patient's authorization/case  [address] [facility] Utilization Review Department  Main: [phone] x carefully listen to the prompts  All voicemails are confidential   [email or web address]  Send all requests for admission clinical reviews, approved or denied determinations and any other requests to dedicated fax number below belonging to the campus where the patient is receiving treatment   List of dedicated fax numbers:  [address] DENIALS (Administrative/Medical Necessity) [phone]   [address]  (Maternity/NICU/Pediatrics) [phone] [address] [facility] [phone]   Thong Palacios [phone]   Aditya Howell [phone]   [place] [id number] [facility] [phone]   [facility]  [phone]   [address] [facility]  [address] [facility] [address] [facility] [phone]
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
## 2021-02-17 NOTE — TELEPHONE ENCOUNTER
Patients wife calling to ask if patient can get his injection today and not see Dr Nathaniel Gallardo  Patient was recently discharged from the hospital  I explained to wife patient should try to keep the appointment with Dr Nathaniel Gallardo - this is scheduled to happen just prior to injection  Wife was agreeable to keep the appointment and will be coming at 2:40 as scheduled    Will send to RN as Omar Dash

## 2021-02-17 NOTE — PROGRESS NOTES
Hematology/Oncology Outpatient Follow-up  Chery South 79 y o  male 1940 [id number]    Date:  2/17/2021        Assessment and Plan:  1  Malignant neuroendocrine neoplasm ([facility] [address] )  The patient's overall condition is concerning due to his multiple comorbidities  I think it would be appropriate to continue him on the lanreotide injection on every 4 week basis to prevent worsening of his symptoms including diarrhea and dehydration/ worsening of his renal failure  We will check his chromogranin a serum level which was not done again prior to this office visit for unknown reason  - CBC and differential; Future  - Comprehensive metabolic panel; Future  - Magnesium; Future  - Chromogranin A; Future  - C-reactive protein; Future  - Sedimentation rate, automated; Future    2  Carcinoid syndrome ([facility] [address] )  He was encouraged to take Imodium if he has no hint of bacterial related diarrhea like C diff  3  CKD (chronic kidney disease) stage 5, GFR less than 15 ml/min ([facility])    He also was encouraged to follow up with his primary care team and nephrology since he has advanced stage renal dysfunction  - Comprehensive metabolic panel; Future    4  Anemia, unspecified type    We will give him 1 unit of packed red blood cells which most likely will improve his overall energy level  He also seems to have low platelet count which could be due to the recent IV antibiotics  - CBC and differential; Future  - Comprehensive metabolic panel; Future        HPI:    The patient came in today for a follow-up visit accompanied by his significant other  He was sitting in a wheelchair  He apparently had multiple recent hospitalization and had COVID-19 infection which was diagnosed on the 24th of January of this year  His most recent hospitalization was just last week and was discharged on 02/15/2021 with urine tract infection due to indwelling Haile catheter  He was treated with IV antibiotics and was sent home    His hemoglobin level on the day of discharge was 7 5  His stools were negative for occult blood  White cells were within normal range with low platelet of a [id number]  Creatinine 0 9 with calcium of 8 1 and normal potassium level  Iron panel showed ferritin of 507 with saturation of 30%  He denies bleeding from any sites  He did have significant diarrhea yesterday which responded nicely to Imodium  Oncology History Overview Note   Patient has multiple comorbid conditions including history of coronary artery disease, diabetes mellitus, hypertension, hyperlipidemia, and more recently metastatic neuroendocrine tumor       The patient was seen in consultation when he was in the hospital on the 5th February 2019 at that time he had significant weakness status post vasovagal syncope   He was evaluated with a CT scan of the abdomen and pelvis on the 23rd January 2019 which showed partially calcified mesenteric mass with multiple liver lesions compatible most likely with carcinoid malignancy   He also was found to have bilateral hydronephrosis and severe bladder wall thickening with prostatomegaly      The patient then had a core biopsy from 1 of the liver lesions on the 24th of January which showed well-differentiated neuroendocrine tumor grade 1-2 with Ki 67 of 3-5%      His chromogranin level  February 2019 was 273   His 24 hour urine for HIAA was 48 which is above normal   The patient was started on Lanreotide in March 2019         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 [address] )     1/24/2019 Biopsy    A  Liver mass, core biopsy:  - Well differentiated neuroendocrine tumor, G2        3/2019 -  Chemotherapy    Lanreotide 120 mg every 4 weeks injections          Interval history:    ROS: Review of Systems   Constitutional: Positive for fatigue  Negative for chills and fever  HENT: Negative for ear pain and sore throat      Eyes: Negative for pain and visual disturbance  Respiratory: Positive for shortness of breath  Negative for cough  Cardiovascular: Negative for chest pain and palpitations  Gastrointestinal: Positive for diarrhea  Negative for abdominal pain and vomiting  Genitourinary: Negative for dysuria and hematuria  Musculoskeletal: Negative for arthralgias and back pain  Skin: Negative for color change and rash  Neurological: Negative for seizures and syncope  All other systems reviewed and are negative  Past Medical History:   Diagnosis Date    Acute pancreatitis without infection or necrosis 9/26/2019    Age-related nuclear cataract, right 11/5/2020    Anemia of chronic renal failure     BPH (benign prostatic hyperplasia)     CKD (chronic kidney disease) stage 5, GFR less than 15 ml/min (HCC)     Coronary artery disease     COVID-19     DJD (degenerative joint disease)     Essential hypertension     Gait disturbance     Generalized weakness     GERD (gastroesophageal reflux disease)     Gout     History of transfusion     Hydronephrosis     Hyperlipidemia     Hypertension     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)     Pressure injury of skin     Proteinuria     Sepsis ([address] ) 3/11/2020    Thrombophlebitis migrans     Type 2 diabetes mellitus        Past Surgical History:   Procedure Laterality Date    CATARACT EXTRACTION Right 11/05/2020    CATARACT EXTRACTION W/ INTRAOCULAR LENS IMPLANT Right 11/5/2020    Procedure: EYE OD CATARACT EXTRACTION WITH IOL INSERTION;  Surgeon: Donald Gay MD;  Location: [address] [place] [address] OR;  Service: Ophthalmology    CHOLECYSTECTOMY     [address] [place] [address] N/A 2/7/2020    Procedure: CHOLECYSTECTOMY LAPAROSCOPIC;  Surgeon:  Yogi Thrasher MD;  Location: [address] [place] [address] OR;  Service: General    CORONARY ANGIOPLASTY WITH STENT PLACEMENT      FL RETROGRADE PYELOGRAM  5/21/2020    IR BIOPSY LIVER MASS  1/24/2019    IR CHOLECYSTOSTOMY TUBE PLACEMENT  9/6/2019  IR NON-TUNNELED CENTRAL LINE PLACEMENT  1/28/2021    ME CYSTO/URETERO W/LITHOTRIPSY &INDWELL STENT INSRT Left 5/21/2020    Procedure: CYSTOSCOPY URETEROSCOPY WITH LITHOTRIPSY HOLMIUM LASER, RETROGRADE PYELOGRAM AND INSERTION STENT URETERAL;  Surgeon: Kar Ratliff MD;  [facility];  Service: Urology    ME CYSTOURETHROSCOPY,URETER CATHETER Left 4/21/2020    Procedure: CYSTOSCOPY WITH INSERTION STENT URETERAL;  Surgeon: Latoya Gutiérrez MD;  [facility];  Service: Urology       Social History     Socioeconomic History    Marital status:       Spouse name: None    Number of children: None    Years of education: None    Highest education level: None   Occupational History    None   Social Needs    Financial resource strain: None    Food insecurity     Worry: None     Inability: None    Transportation needs     Medical: None     Non-medical: None   Tobacco Use    Smoking status: Never Smoker    Smokeless tobacco: Never Used   Substance and Sexual Activity    Alcohol use: Never     Alcohol/week: 0 0 standard drinks     Frequency: Never    Drug use: Never    Sexual activity: Not Currently   Lifestyle    Physical activity     Days per week: None     Minutes per session: None    Stress: None   Relationships    Social connections     Talks on phone: None     Gets together: None     Attends Alevism service: None     Active member of club or organization: None     Attends meetings of clubs or organizations: None     Relationship status: None    Intimate partner violence     Fear of current or ex partner: None     Emotionally abused: None     Physically abused: None     Forced sexual activity: None   Other Topics Concern    None   Social History Narrative    None       Family History   Problem Relation Age of Onset    Cancer Mother     Hypertension Father     Cancer Brother     Cancer Maternal Grandmother     Cancer Paternal Aunt        No Known Allergies      Current Outpatient Medications:     acetaminophen (TYLENOL) 325 mg tablet, Take 650 mg by mouth every 6 (six) hours as needed , Disp: , Rfl:     ascorbic acid (VITAMIN C) 1000 MG tablet, Take 1 tablet (1,000 mg total) by mouth every 12 (twelve) hours for 6 doses, Disp: 6 tablet, Rfl: 0    aspirin 81 MG tablet, Take 81 mg by mouth daily, Disp: , Rfl:     B Complex-C-Folic Acid (triphrocaps) 1 MG CAPS, Take 1 capsule (1 mg total) by mouth daily with dinner, Disp: 90 capsule, Rfl: 3    cholecalciferol (VITAMIN D3) 1,000 units tablet, Take 1,000 Units by mouth daily Take 2, Disp: , Rfl:     famotidine (PEPCID) 10 mg tablet, Take 1 tablet (10 mg total) by mouth daily at bedtime, Disp: 30 tablet, Rfl: 0    magnesium oxide (MAG-OX) 400 mg, Take 0 5 tablets (200 mg total) by mouth 2 (two) times a day, Disp: , Rfl:     metoprolol tartrate (LOPRESSOR) 25 mg tablet, Take 0 5 tablets (12 5 mg total) by mouth every 12 (twelve) hours, Disp: 30 tablet, Rfl: 0    NIFEdipine ER (ADALAT CC) 60 MG 24 hr tablet, Take 1 tablet (60 mg total) by mouth daily, Disp: 30 tablet, Rfl: 3    pantoprazole (PROTONIX) 40 mg tablet, Take 1 tablet (40 mg total) by mouth daily, Disp: 90 tablet, Rfl: 1    sodium bicarbonate 650 mg tablet, Take 325 mg by mouth 2 (two) times a day , Disp: , Rfl:     sucralfate (CARAFATE) 1 g/10 mL suspension, Take 10 mL (1,000 mg total) by mouth every 6 (six) hours, Disp: 420 mL, Rfl: 0    tamsulosin (FLOMAX) 0 4 mg, Take 1 capsule (0 4 mg total) by mouth daily with dinner, Disp: 90 capsule, Rfl: 3    vancomycin (VANCOCIN) 125 MG capsule, Take 1 capsule (125 mg total) by mouth 2 (two) times a day for 3 days, Disp: 6 capsule, Rfl: 0    vitamin B-12 (VITAMIN B-12) 1,000 mcg tablet, Take 100 mcg by mouth daily , Disp: , Rfl:     zinc sulfate (ZINCATE) 220 mg capsule, Take 1 capsule (220 mg total) by mouth daily for 3 doses, Disp: 3 capsule, Rfl: 0      Physical Exam:  /66 (BP Location: Left arm, Patient Position: Sitting, Cuff Size: Adult)   Pulse 73   Temp 97 7 °F (36 5 °C) (Tympanic)   Resp 18   SpO2 99%     Physical Exam  Constitutional:       General: He is in acute distress  Appearance: He is well-developed  He is ill-appearing  HENT:      Head: Normocephalic and atraumatic  Eyes:      General: No scleral icterus  Right eye: No discharge  Left eye: No discharge  Conjunctiva/sclera: Conjunctivae normal       Pupils: Pupils are equal, round, and reactive to light  Neck:      Musculoskeletal: Normal range of motion and neck supple  Thyroid: No thyromegaly  Trachea: No tracheal deviation  Cardiovascular:      Rate and Rhythm: Normal rate and regular rhythm  Heart sounds: Normal heart sounds  No murmur  No friction rub  Pulmonary:      Effort: Pulmonary effort is normal  No respiratory distress  Breath sounds: Normal breath sounds  No wheezing or rales  Chest:      Chest wall: No tenderness  Abdominal:      General: There is no distension  Palpations: Abdomen is soft  There is no hepatomegaly or splenomegaly  Tenderness: There is no abdominal tenderness  There is no guarding or rebound  Musculoskeletal: Normal range of motion  General: No tenderness or deformity  Lymphadenopathy:      Cervical: No cervical adenopathy  Skin:     General: Skin is warm and dry  Coloration: Skin is pale  Findings: No erythema or rash  Neurological:      Mental Status: He is alert and oriented to person, place, and time  Cranial Nerves: No cranial nerve deficit  Coordination: Coordination normal       Deep Tendon Reflexes: Reflexes are normal and symmetric  Reflexes normal    Psychiatric:         Behavior: Behavior normal          Thought Content:  Thought content normal          Judgment: Judgment normal            Labs:  Lab Results   Component Value Date    WBC 7 29 02/14/2021    HGB 7 4 (L) 02/14/2021    HCT 22 8 (L) 02/14/2021    MCV 93 02/14/2021     (L) 02/14/2021     Lab Results   Component Value Date    K 4 0 02/14/2021     (H) 02/14/2021    CO2 21 02/14/2021    BUN 45 (H) 02/14/2021    CREATININE 4 92 (H) 02/14/2021    GLUF 84 01/08/2021    CALCIUM 8 1 (L) 02/14/2021    CORRECTEDCA 9 5 02/09/2021    AST 40 02/09/2021    ALT 77 02/09/2021    ALKPHOS 403 (H) 02/09/2021    EGFR 10 02/14/2021     No results found for: TSH    Patient voiced understanding and agreement in the above discussion  Aware to contact our office with questions/symptoms in the interim

## 2021-02-18 PROBLEM — D69.6 THROMBOCYTOPENIA, UNSPECIFIED (HCC): Status: ACTIVE | Noted: 2021-01-01

## 2021-02-18 PROBLEM — I74.3 EMBOLISM AND THROMBOSIS OF ARTERIES OF THE LOWER EXTREMITIES (HCC): Status: ACTIVE | Noted: 2021-01-01

## 2021-02-23 NOTE — PROGRESS NOTES
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) (Chronic)       Endocrine    Carcinoid syndrome ([facility] [address] )       Cardiovascular and Mediastinum    Essential hypertension    Relevant Medications    NIFEdipine ER (ADALAT CC) 60 MG 24 hr tablet       Genitourinary    Hypertensive kidney disease with stage 5 chronic kidney disease, not on chronic dialysis (HCC)    CKD (chronic kidney disease) stage 5, GFR less than 15 ml/min (HCC) - Primary    Relevant Orders    Comprehensive metabolic panel    Phosphorus      Other Visit Diagnoses     [facility]        Relevant Orders    PTH, intact          1  Recent acute kidney injury atop chronic kidney disease  · Improving  sCr on presentation 6 08 mg/dL -> 4 92 mg/dL 2/14/21  Monitor for renal recovery  2  Stage 5 chronic kidney disease, medically managed without hemodialysis, with baseline creatinine around 4 5 mg/dL  · Continue to avoid nephrotoxins and NSAIDs  Repeat blood work is pending  Will continue close monitoring and supportive care  3  Multifactorial anemia  · Followed closely by Hematology and is for blood transfusion  4  Urinary retention withchronic indwelling urinary catheter- continue flomax  5  Metastatic neuroendocrine carcinoma with carcinoid syndrome  6  Recent Klebsiella oxytocia ESBL, Enterococcus bacillus urinary tract infection- noted  7  Type 2 diabetes mellitus  8  Metabolic acidosis  · Monitor indication to titrate bicarbonate tablets    Reason for visit is   Chief Complaint   Patient presents with    Virtual Brief Visit        Encounter provider RIKA Baltazar    Provider located at [address] [facility]  [phone]    Recent Visits  No visits were found meeting these conditions     Showing recent visits within past 7 days and meeting all other requirements     Today's Visits  Date Type Provider Dept   02/23/21 Telemedicine Omari Mckinley, [address]   Showing today's visits and meeting all other requirements     Future Appointments  No visits were found meeting these conditions  Showing future appointments within next 150 days and meeting all other requirements        After connecting through telephone and patient was informed that this is not a secure, HIPAA-compliant platform  He agrees to proceed  , the patient was identified by name and date of birth  Jessy Mcgraw was informed that this is a telemedicine visit and that the visit is being conducted through telephone  My office door was closed  No one else was in the room  He acknowledged consent and understanding of privacy and security of the platform  The patient has agreed to participate and understands he can discontinue the visit at any time  Patient is aware this is a billable service  Jessy Mcgraw is a 79 y o  male Medical problems include CKD 5 medically managed without dialysis, metastatic neuroendocrine carcinoma followed by Hematology/Oncology, chronic indwelling urinary catheter,  Multifactorial anemia who presents today via telephone for hospital follow-up  He is assisted by his significant other  Hospitalized at [facility] 2/9-2/15/21  sCr on presentation 6 08 mg/dL -> 4 92 mg/dL 2/14/21-  No updated blood work to review  Patient is feeling better  He is working with therapy  Med rec done and he was on 2 calcium channel blockers- will DC amlodipine in lieu of long-acting nifedipine  He is going to have a blood transfusion per Hematology  His diarrhea is improving and he is continued oninjections for carcinoid syndrome  He will avoid NSAIDs and nephrotoxins  Will continue to manage late stage renal failure without dialysis her that he can change his mind    Follow-up in 2 months  Past Medical History:   Diagnosis Date    Acute pancreatitis without infection or necrosis 9/26/2019    Age-related nuclear cataract, right 11/5/2020    Anemia of chronic renal failure     BPH (benign prostatic hyperplasia)     CKD (chronic kidney disease) stage 5, GFR less than 15 ml/min (HCC)     Coronary artery disease     COVID-19     DJD (degenerative joint disease)     Essential hypertension     Gait disturbance     Generalized weakness     GERD (gastroesophageal reflux disease)     Gout     History of transfusion     Hydronephrosis     Hyperlipidemia     Hypertension     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)     Pressure injury of skin     Proteinuria     Sepsis ([address] ) 3/11/2020    Thrombophlebitis migrans     Type 2 diabetes mellitus        Past Surgical History:   Procedure Laterality Date    CATARACT EXTRACTION Right 11/05/2020    CATARACT EXTRACTION W/ INTRAOCULAR LENS IMPLANT Right 11/5/2020    Procedure: EYE OD CATARACT EXTRACTION WITH IOL INSERTION;  Surgeon: Sammi Hammer MD;  Location: [facility] MAIN OR;  Service: Ophthalmology    CHOLECYSTECTOMY     [address] [place] [address] N/A 2/7/2020    Procedure: CHOLECYSTECTOMY LAPAROSCOPIC;  Surgeon:  Jesenia Parker MD;  Location: [facility] MAIN OR;  Service: General    CORONARY ANGIOPLASTY WITH STENT PLACEMENT      FL RETROGRADE PYELOGRAM  5/21/2020    IR BIOPSY LIVER MASS  1/24/2019    IR CHOLECYSTOSTOMY TUBE PLACEMENT  9/6/2019    IR NON-TUNNELED CENTRAL LINE PLACEMENT  1/28/2021    CO CYSTO/URETERO W/LITHOTRIPSY &INDWELL STENT INSRT Left 5/21/2020    Procedure: CYSTOSCOPY URETEROSCOPY WITH LITHOTRIPSY HOLMIUM LASER, RETROGRADE PYELOGRAM AND INSERTION STENT URETERAL;  Surgeon: Rebecca Hassan MD;  [facility];  Service: Urology    CO CYSTOURETHROSCOPY,URETER CATHETER Left 4/21/2020    Procedure: CYSTOSCOPY WITH INSERTION STENT URETERAL;  Surgeon: Torey Alfaro MD;  [facility]; Service: Urology       Current Outpatient Medications   Medication Sig Dispense Refill    acetaminophen (TYLENOL) 325 mg tablet Take 650 mg by mouth every 6 (six) hours as needed       ascorbic acid (VITAMIN C) 1000 MG tablet Take 1 tablet (1,000 mg total) by mouth every 12 (twelve) hours for 6 doses 6 tablet 0    aspirin 81 MG tablet Take 81 mg by mouth daily      B Complex-C-Folic Acid (triphrocaps) 1 MG CAPS Take 1 capsule (1 mg total) by mouth daily with dinner 90 capsule 3    cholecalciferol (VITAMIN D3) 1,000 units tablet Take 1,000 Units by mouth daily Take 2      famotidine (PEPCID) 10 mg tablet Take 1 tablet (10 mg total) by mouth daily at bedtime 30 tablet 0    magnesium oxide (MAG-OX) 400 mg Take 0 5 tablets (200 mg total) by mouth 2 (two) times a day      metoprolol tartrate (LOPRESSOR) 25 mg tablet Take 0 5 tablets (12 5 mg total) by mouth every 12 (twelve) hours 90 tablet 1    NIFEdipine ER (ADALAT CC) 60 MG 24 hr tablet Take 1 tablet (60 mg total) by mouth daily 90 tablet 3    pantoprazole (PROTONIX) 40 mg tablet Take 1 tablet (40 mg total) by mouth daily 90 tablet 1    sodium bicarbonate 650 mg tablet Take 325 mg by mouth 2 (two) times a day       tamsulosin (FLOMAX) 0 4 mg Take 1 capsule (0 4 mg total) by mouth daily with dinner 90 capsule 3    vitamin B-12 (VITAMIN B-12) 1,000 mcg tablet Take 100 mcg by mouth daily       zinc sulfate (ZINCATE) 220 mg capsule Take 1 capsule (220 mg total) by mouth daily for 3 doses 3 capsule 0    sucralfate (CARAFATE) 1 g/10 mL suspension Take 10 mL (1,000 mg total) by mouth every 6 (six) hours (Patient not taking: Reported on 2/23/2021) 420 mL 0     No current facility-administered medications for this visit  No Known Allergies    Review of Systems   Constitutional: Positive for fatigue  HENT: Negative  Eyes: Negative  Respiratory: Negative  Cardiovascular: Negative  Gastrointestinal: Negative  Endocrine: Negative  Genitourinary: Negative  Musculoskeletal: Negative  Allergic/Immunologic: Negative  Neurological: Negative  Hematological: Negative  Psychiatric/Behavioral: Negative  Vitals:    02/23/21 0848   BP: 125/80   Pulse: 74   SpO2: 97%   Weight: 63 5 kg (140 lb)   Height: 5' 8" (1 727 m)         I spent 15 minutes directly with the patient during this visit    VIRTUAL VISIT DISCLAIMER    Arturo Ortiz acknowledges that he has consented to an online visit or consultation  He understands that the online visit is based solely on information provided by him, and that, in the absence of a face-to-face physical evaluation by the physician, the diagnosis he receives is both limited and provisional in terms of accuracy and completeness  This is not intended to replace a full medical face-to-face evaluation by the physician  Arturo Ortiz understands and accepts these terms

## 2021-02-24 NOTE — PLAN OF CARE
Problem: INFECTION - ADULT  Goal: Absence or prevention of progression during hospitalization  Description: INTERVENTIONS:  - Assess and monitor for signs and symptoms of infection  - Monitor lab/diagnostic results  - Monitor all insertion sites, i e  indwelling lines, tubes, and drains  - Monitor endotracheal if appropriate and nasal secretions for changes in amount and color  - [place] appropriate cooling/warming therapies per order  - Administer medications as ordered  - Instruct and encourage patient and family to use good hand hygiene technique  - Identify and instruct in appropriate isolation precautions for identified infection/condition  Outcome: Progressing

## 2021-02-24 NOTE — PROGRESS NOTES
Type and screen done upon admit  Blood consent noted  Pt tolerated one unit of prbcs well  No adverse reactions noted  Iv site discontinued jelco intact  Pressure dressing applied  Assisted to br twice today for loose stools which is his baseline per pt  Discharged in [place] to meet wife  avs provided

## 2021-03-09 PROBLEM — J90 PLEURAL EFFUSION: Status: ACTIVE | Noted: 2021-01-01

## 2021-03-09 NOTE — ASSESSMENT & PLAN NOTE
Patient has chronic Haile catheter in place  History of the current UTI in the past   Treated with IV meropenem in the past   Previous history of ESBL E coli, Klebsiella, E faecalis  Urine culture sensitive to ceftriaxone  Continue ceftriaxone  Urinalysis positive for yeast, continue fluconazole  Follow urine culture    Consult ID  Continue to monitor closely

## 2021-03-09 NOTE — ASSESSMENT & PLAN NOTE
Lab Results   Component Value Date    EGFR 10 03/09/2021    EGFR 10 02/14/2021    EGFR 11 02/13/2021    CREATININE 5 00 (H) 03/09/2021    CREATININE 4 92 (H) 02/14/2021    CREATININE 4 74 (H) 02/13/2021   Baseline creatinine 4 5-5 3  Today creatinine is 5  Continue to monitor closely

## 2021-03-09 NOTE — TELEPHONE ENCOUNTER
[facility] called back, patient's BP has come down to 176/88 but the patient is dehydrated, would you like an office visit with the patient?

## 2021-03-09 NOTE — PLAN OF CARE
Problem: Potential for Falls  Goal: Patient will remain free of falls  Description: INTERVENTIONS:  - Assess patient frequently for physical needs  -  Identify cognitive and physical deficits and behaviors that affect risk of falls    -  [place] fall precautions as indicated by assessment   - Educate patient/family on patient safety including physical limitations  - Instruct patient to call for assistance with activity based on assessment  - Modify environment to reduce risk of injury  - Consider OT/PT consult to assist with strengthening/mobility  Outcome: Progressing     Problem: Prexisting or High Potential for Compromised Skin Integrity  Goal: Skin integrity is maintained or improved  Description: INTERVENTIONS:  - Identify patients at risk for skin breakdown  - Assess and monitor skin integrity  - Assess and monitor nutrition and hydration status  - Monitor labs   - Assess for incontinence   - Turn and reposition patient  - Assist with mobility/ambulation  - Relieve pressure over bony prominences  - Avoid friction and shearing  - Provide appropriate hygiene as needed including keeping skin clean and dry  - Evaluate need for skin moisturizer/barrier cream  - Collaborate with interdisciplinary team   - Patient/family teaching  - Consider wound care consult   Outcome: Progressing     Problem: PAIN - ADULT  Goal: Verbalizes/displays adequate comfort level or baseline comfort level  Description: Interventions:  - Encourage patient to monitor pain and request assistance  - Assess pain using appropriate pain scale  - Administer analgesics based on type and severity of pain and evaluate response  - Implement non-pharmacological measures as appropriate and evaluate response  - Consider cultural and social influences on pain and pain management  - Notify physician/advanced practitioner if interventions unsuccessful or patient reports new pain  Outcome: Progressing     Problem: INFECTION - ADULT  Goal: Absence or prevention of progression during hospitalization  Description: INTERVENTIONS:  - Assess and monitor for signs and symptoms of infection  - Monitor lab/diagnostic results  - Monitor all insertion sites, i e  indwelling lines, tubes, and drains  - Monitor endotracheal if appropriate and nasal secretions for changes in amount and color  - [place] appropriate cooling/warming therapies per order  - Administer medications as ordered  - Instruct and encourage patient and family to use good hand hygiene technique  - Identify and instruct in appropriate isolation precautions for identified infection/condition  Outcome: Progressing  Goal: Absence of fever/infection during neutropenic period  Description: INTERVENTIONS:  - Monitor WBC    Outcome: Progressing     Problem: SAFETY ADULT  Goal: Patient will remain free of falls  Description: INTERVENTIONS:  - Assess patient frequently for physical needs  -  Identify cognitive and physical deficits and behaviors that affect risk of falls    -  [place] fall precautions as indicated by assessment   - Educate patient/family on patient safety including physical limitations  - Instruct patient to call for assistance with activity based on assessment  - Modify environment to reduce risk of injury  - Consider OT/PT consult to assist with strengthening/mobility  Outcome: Progressing  Goal: Maintain or return to baseline ADL function  Description: INTERVENTIONS:  -  Assess patient's ability to carry out ADLs; assess patient's baseline for ADL function and identify physical deficits which impact ability to perform ADLs (bathing, care of mouth/teeth, toileting, grooming, dressing, etc )  - Assess/evaluate cause of self-care deficits   - Assess range of motion  - Assess patient's mobility; develop plan if impaired  - Assess patient's need for assistive devices and provide as appropriate  - Encourage maximum independence but intervene and supervise when necessary  - Involve family in performance of ADLs  - Assess for home care needs following discharge   - Consider OT consult to assist with ADL evaluation and planning for discharge  - Provide patient education as appropriate  Outcome: Progressing  Goal: Maintain or return mobility status to optimal level  Description: INTERVENTIONS:  - Assess patient's baseline mobility status (ambulation, transfers, stairs, etc )    - Identify cognitive and physical deficits and behaviors that affect mobility  - Identify mobility aids required to assist with transfers and/or ambulation (gait belt, sit-to-stand, lift, walker, cane, etc )  - [place] fall precautions as indicated by assessment  - Record patient progress and toleration of activity level on Mobility SBAR; progress patient to next Phase/Stage  - Instruct patient to call for assistance with activity based on assessment  - Consider rehabilitation consult to assist with strengthening/weightbearing, etc   Outcome: Progressing     Problem: DISCHARGE PLANNING  Goal: Discharge to home or other facility with appropriate resources  Description: INTERVENTIONS:  - Identify barriers to discharge w/patient and caregiver  - Arrange for needed discharge resources and transportation as appropriate  - Identify discharge learning needs (meds, wound care, etc )  - Arrange for interpretive services to assist at discharge as needed  - Refer to Case Management Department for coordinating discharge planning if the patient needs post-hospital services based on physician/advanced practitioner order or complex needs related to functional status, cognitive ability, or social support system  Outcome: Progressing     Problem: Knowledge Deficit  Goal: Patient/family/caregiver demonstrates understanding of disease process, treatment plan, medications, and discharge instructions  Description: Complete learning assessment and assess knowledge base    Interventions:  - Provide teaching at level of understanding  - Provide teaching via preferred learning methods  Outcome: Progressing

## 2021-03-09 NOTE — TELEPHONE ENCOUNTER
[facility] is at the patient's for their home visit and called to report the patient's BP reading at 194/87

## 2021-03-09 NOTE — ED PROVIDER NOTES
Pt Name: Obinna Jordan  MRN: [id number]  [place] 1940  Age/Sex: 79 y o  male  Date of evaluation: 3/9/2021  PCP: Karen Bellamy, [address] [place] [address]    Chief Complaint   Patient presents with    Diarrhea     Pt arrives via EMS due to the home health nurse calling for episode of diarrhea X3 weeks as well as hypotension  Pt has hx of liver cancer          HPI    79 y o  male presenting with diarrhea  Patient has a known history of liver cancer, home health care noted him to be hypotensive earlier today and also having diarrhea, causing her to call an ambulance to transport him to the ER  He notes recent diarrhea but denies any chest pain, abdominal pain, nausea, vomiting, trauma, fevers, sick contacts, other symptoms        HPI      Past Medical and Surgical History    Past Medical History:   Diagnosis Date    Acute pancreatitis without infection or necrosis 9/26/2019    Age-related nuclear cataract, right 11/5/2020    Anemia of chronic renal failure     BPH (benign prostatic hyperplasia)     CKD (chronic kidney disease) stage 5, GFR less than 15 ml/min (HCC)     Coronary artery disease     COVID-19     DJD (degenerative joint disease)     Essential hypertension     Gait disturbance     Generalized weakness     GERD (gastroesophageal reflux disease)     Gout     History of transfusion     Hydronephrosis     Hyperlipidemia     Hypertension     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)     Pressure injury of skin     Proteinuria     Sepsis ([address] ) 3/11/2020    Thrombophlebitis migrans     Type 2 diabetes mellitus        Past Surgical History:   Procedure Laterality Date    CATARACT EXTRACTION Right 11/05/2020    CATARACT EXTRACTION W/ INTRAOCULAR LENS IMPLANT Right 11/5/2020    Procedure: EYE OD CATARACT EXTRACTION WITH IOL INSERTION;  Surgeon: Elena Morrison MD;  Location: [facility] MAIN OR;  Service: Ophthalmology    CHOLECYSTECTOMY      CHOLECYSTECTOMY LAPAROSCOPIC N/A 2/7/2020    Procedure: CHOLECYSTECTOMY LAPAROSCOPIC;  Surgeon: Saji Griffith MD;  Location: [facility] MAIN OR;  Service: General    CORONARY ANGIOPLASTY WITH STENT PLACEMENT      FL RETROGRADE PYELOGRAM  5/21/2020    IR BIOPSY LIVER MASS  1/24/2019    IR CHOLECYSTOSTOMY TUBE PLACEMENT  9/6/2019    IR NON-TUNNELED CENTRAL LINE PLACEMENT  1/28/2021    MD CYSTO/URETERO W/LITHOTRIPSY &INDWELL STENT INSRT Left 5/21/2020    Procedure: CYSTOSCOPY URETEROSCOPY WITH LITHOTRIPSY HOLMIUM LASER, RETROGRADE PYELOGRAM AND INSERTION STENT URETERAL;  Surgeon: Mingo Marquez MD;  Location: [facility] MA[facility];  Service: Urology    MD CYSTOURETHROSCOPY,URETER CATHETER Left 4/21/2020    Procedure: CYSTOSCOPY WITH INSERTION STENT URETERAL;  Surgeon: Yoan Mariano MD;  Location[facility];  Service: Urology       Family History   Problem Relation Age of Onset    Cancer Mother     Hypertension Father     Cancer Brother     Cancer Maternal Grandmother     Cancer Paternal Aunt        Social History     Tobacco Use    Smoking status: Never Smoker    Smokeless tobacco: Never Used   Substance Use Topics    Alcohol use: Never     Alcohol/week: 0 0 standard drinks     Frequency: Never    Drug use: Never           Allergies    No Known Allergies    Home Medications    Prior to Admission medications    Medication Sig Start Date End Date Taking?  Authorizing Provider   acetaminophen (TYLENOL) 325 mg tablet Take 650 mg by mouth every 6 (six) hours as needed     Historical Provider, MD   ascorbic acid (VITAMIN C) 1000 MG tablet Take 1 tablet (1,000 mg total) by mouth every 12 (twelve) hours for 6 doses 1/28/21   Mai Amaya MD   aspirin 81 MG tablet Take 81 mg by mouth daily    Historical Provider, MD   B Complex-C-Folic Acid (triphrocaps) 1 MG CAPS Take 1 capsule (1 mg total) by mouth daily with dinner 2/4/21   Kacie Reynolds DO   cholecalciferol (VITAMIN D3) 1,000 units tablet Take 1,000 Units by mouth daily Take 2    Historical Provider, MD   famotidine (PEPCID) 10 mg tablet Take 1 tablet (10 mg total) by mouth daily at bedtime 8/11/20   RIKA Simon   magnesium oxide (MAG-OX) 400 mg Take 0 5 tablets (200 mg total) by mouth 2 (two) times a day 1/20/21 2/23/21  Tom Morales DO   metoprolol tartrate (LOPRESSOR) 25 mg tablet Take 0 5 tablets (12 5 mg total) by mouth every 12 (twelve) hours 2/18/21   RIKA Roberts   NIFEdipine ER (ADALAT CC) 60 MG 24 hr tablet Take 1 tablet (60 mg total) by mouth daily 2/23/21   Harmony BudRIKA garcia   pantoprazole (PROTONIX) 40 mg tablet Take 1 tablet (40 mg total) by mouth daily 11/10/20   RIKA Sapp   sodium bicarbonate 650 mg tablet Take 325 mg by mouth 2 (two) times a day     Historical Provider, MD   sucralfate (CARAFATE) 1 g/10 mL suspension Take 10 mL (1,000 mg total) by mouth every 6 (six) hours  Patient not taking: Reported on 2/23/2021 8/11/20   RIKA Simon   tamsulosin [facility]) 0 4 mg Take 1 capsule (0 4 mg total) by mouth daily with dinner 2/4/21   Tom Morales DO   vitamin B-12 (VITAMIN B-12) 1,000 mcg tablet Take 100 mcg by mouth daily     Historical Provider, MD   zinc sulfate (ZINCATE) 220 mg capsule Take 1 capsule (220 mg total) by mouth daily for 3 doses 1/29/21 2/23/21  Willy Epstein MD           Review of Systems    Review of Systems   Constitutional: Positive for fatigue  Negative for appetite change, chills and diaphoresis  HENT: Negative for drooling, facial swelling, trouble swallowing and voice change  Respiratory: Negative for apnea, shortness of breath and wheezing  Cardiovascular: Negative for chest pain and leg swelling  Gastrointestinal: Positive for diarrhea  Negative for abdominal distention, abdominal pain, nausea and vomiting  Genitourinary: Negative for dysuria and urgency  Musculoskeletal: Negative for arthralgias, back pain, gait problem and neck pain  Skin: Negative for color change, rash and wound  Neurological: Negative for seizures, speech difficulty, weakness and headaches  Psychiatric/Behavioral: Negative for agitation, behavioral problems and dysphoric mood  The patient is not nervous/anxious  All other systems reviewed and negative  Physical Exam      ED Triage Vitals   Temperature Pulse Respirations Blood Pressure SpO2   03/09/21 1302 03/09/21 1135 03/09/21 1135 03/09/21 1135 03/09/21 1135   97 8 °F (36 6 °C) 69 20 107/57 98 %      Temp Source Heart Rate Source Patient Position - Orthostatic VS BP Location FiO2 (%)   03/09/21 1302 03/09/21 1135 -- 03/09/21 1135 --   Oral Monitor  Right arm       Pain Score       03/09/21 1416       Worst Possible Pain               Physical Exam  Vitals signs and nursing note reviewed  Constitutional:       Appearance: He is well-developed  HENT:      Head: Normocephalic and atraumatic  Eyes:      Conjunctiva/sclera: Conjunctivae normal       Comments: Right pupil deformed and asymmetric, patient states he has been told this before  Neck:      Musculoskeletal: Normal range of motion and neck supple  Trachea: No tracheal deviation  Cardiovascular:      Rate and Rhythm: Normal rate and regular rhythm  Heart sounds: Normal heart sounds  No murmur  Pulmonary:      Effort: Pulmonary effort is normal  No respiratory distress  Breath sounds: Normal breath sounds  No stridor  No wheezing or rales  Abdominal:      General: There is no distension  Palpations: Abdomen is soft  Tenderness: There is no abdominal tenderness  There is no guarding or rebound  Musculoskeletal: Normal range of motion  General: No deformity  Skin:     General: Skin is warm and dry  Findings: No rash  Neurological:      General: No focal deficit present  Mental Status: He is alert  Cranial Nerves: No cranial nerve deficit  Motor: No weakness        Coordination: Coordination normal       Gait: Gait normal  Comments: Patient oriented to self, situation, year, 1 day off on day of the week   Psychiatric:         Behavior: Behavior normal          Thought Content: Thought content normal          Judgment: Judgment normal               Diagnostic Results    EKG Interpretation    Rate:  68  BPM  Rhythm:  Normal Sinus Rhythm   Axis:  Left axis deviation  Intervals: Normal, no blocks, QTc  442 ms  Q waves:  No pathologic Q waves   T waves:  Flattened in lateral leads  ST segments:  No significant elevations or depressions     Impression:  Normal sinus rhythm with left axis deviation and nonspecific ST-T changes but without evidence of acute ischemia or significant arrhythmia      EKG for comparison:  EKG dated 13 February 2021 similar in character with no changes    EKG interpreted by me  Labs:    Results Reviewed     Procedure Component Value Units Date/Time    Urine Microscopic [[id number]]  (Abnormal) Collected: 03/09/21 1320    Lab Status: Final result Specimen: Urine, Indwelling Haile Catheter Updated: 03/09/21 1335     RBC, UA 10-20 /hpf      WBC, UA Innumerable /hpf      Epithelial Cells Occasional /hpf      Bacteria, UA Moderate /hpf      WBC Clumps Present     OTHER OBSERVATIONS Yeast Cells Present    Narrative:      Urine volume less than 1mL  Urine microscopic performed on uncentrifuged urine  Urine culture [[id number]] Collected: 03/09/21 1320    Lab Status:  In process Specimen: Urine, Indwelling Haile Catheter Updated: 03/09/21 1335    UA w Reflex to Microscopic w Reflex to Culture [[id number]]  (Abnormal) Collected: 03/09/21 1320    Lab Status: Final result Specimen: Urine, Indwelling Haile Catheter Updated: 03/09/21 1330     Color, UA Yellow     Clarity, UA Turbid     Specific Gravity, UA 1 025     pH, UA 5 5     Leukocytes, UA Moderate     Nitrite, UA Negative     Protein,  (2+) mg/dl      Glucose, UA Negative mg/dl      Ketones, UA Negative mg/dl      Urobilinogen, UA 0 2 E U /dl      Bilirubin, UA Negative     Blood, UA Large    Lactic acid [[id number]]  (Normal) Collected: 03/09/21 1249    Lab Status: Final result Specimen: Blood from Arm, Left Updated: 03/09/21 1321     LACTIC ACID 2 0 mmol/L     Narrative:      Result may be elevated if tourniquet was used during collection  Comprehensive metabolic panel [[id number]]  (Abnormal) Collected: 03/09/21 1235    Lab Status: Final result Specimen: Blood from Arm, Left Updated: 03/09/21 1307     Sodium 140 mmol/L      Potassium 3 6 mmol/L      Chloride 97 mmol/L      CO2 23 mmol/L      ANION GAP 20 mmol/L      BUN 48 mg/dL      Creatinine 5 00 mg/dL      Glucose 209 mg/dL      Calcium 8 8 mg/dL      Corrected Calcium 9 6 mg/dL      AST 26 U/L      ALT 27 U/L      Alkaline Phosphatase 197 U/L      Total Protein 6 9 g/dL      Albumin 3 0 g/dL      Total Bilirubin 0 67 mg/dL      eGFR 10 ml/min/1 73sq m     Narrative:      [place] guidelines for Chronic Kidney Disease (CKD):     Stage 1 with normal or high GFR (GFR > 90 mL/min/1 73 square meters)    Stage 2 Mild CKD (GFR = 60-89 mL/min/1 73 square meters)    Stage 3A Moderate CKD (GFR = 45-59 mL/min/1 73 square meters)    Stage 3B Moderate CKD (GFR = 30-44 mL/min/1 73 square meters)    Stage 4 Severe CKD (GFR = 15-29 mL/min/1 73 square meters)    Stage 5 End Stage CKD (GFR <15 mL/min/1 73 square meters)  Note: GFR calculation is accurate only with a steady state creatinine    Blood culture #1 [[id number]] Collected: 03/09/21 1301    Lab Status:  In process Specimen: Blood from Arm, Right Updated: 03/09/21 1306    Troponin I [[id number]]  (Normal) Collected: 03/09/21 1235    Lab Status: Final result Specimen: Blood from Arm, Left Updated: 03/09/21 1300     Troponin I <0 02 ng/mL     Protime-INR [[id number]]  (Normal) Collected: 03/09/21 1235    Lab Status: Final result Specimen: Blood from Arm, Left Updated: 03/09/21 1253     Protime 12 9 seconds      INR 0 95    APTT [[id number]] (Normal) Collected: 03/09/21 1235    Lab Status: Final result Specimen: Blood from Arm, Left Updated: 03/09/21 1253     PTT 30 seconds     Blood culture #2 [[id number]] Collected: 03/09/21 1249    Lab Status: In process Specimen: Blood from Arm, Left Updated: 03/09/21 1253    CBC and differential [[id number]]  (Abnormal) Collected: 03/09/21 1235    Lab Status: Final result Specimen: Blood from Arm, Left Updated: 03/09/21 1244     WBC 12 28 Thousand/uL      RBC 3 82 Million/uL      Hemoglobin 11 5 g/dL      Hematocrit 34 7 %      MCV 91 fL      MCH 30 1 pg      MCHC 33 1 g/dL      RDW 14 6 %      MPV 11 1 fL      Platelets 981 Thousands/uL      nRBC 0 /100 WBCs      Neutrophils Relative 76 %      Immat GRANS % 1 %      Lymphocytes Relative 15 %      Monocytes Relative 6 %      Eosinophils Relative 1 %      Basophils Relative 1 %      Neutrophils Absolute 9 46 Thousands/µL      Immature Grans Absolute 0 06 Thousand/uL      Lymphocytes Absolute 1 81 Thousands/µL      Monocytes Absolute 0 70 Thousand/µL      Eosinophils Absolute 0 14 Thousand/µL      Basophils Absolute 0 11 Thousands/µL     Procalcitonin with AM Reflex [[id number]] Collected: 03/09/21 1235    Lab Status: In process Specimen: Blood from Arm, Left Updated: 03/09/21 1241    Fingerstick Glucose (POCT) [[id number]]  (Abnormal) Collected: 03/09/21 1141    Lab Status: Final result Updated: 03/09/21 1144     POC Glucose 230 mg/dl           All labs reviewed and utilized in the medical decision making process    Radiology:    CT abdomen pelvis wo contrast   Final Result      1  Apparent progression of numerous hepatic lesions likely secondary to metastatic disease in the setting of carcinoid syndrome  2  Stable, partially calcified dominant mesenteric mass presumably carcinoid tumor  3  Stable omental nodularity in keeping with peritoneal carcinomatosis  4  No evidence of bowel obstruction    Questionable thickening of the sigmoid colon which may be artifactual due to noncontrast imaging  No pericolonic inflammatory changes  5  Small amount of abdominal ascites and bilateral pleural effusions (right larger than left), similar  Limited study without contrast       Workstation performed: [id number]         XR chest portable    (Results Pending)       All radiology studies independently viewed by me and interpreted by the radiologist     Procedure    Procedures        ED Course of Care and Re-Assessments      Given ceftriaxone and Diflucan with concern for infection in urine  Medications   sodium chloride (PF) 0 9 % injection 3 mL (3 mL Intravenous Given 3/9/21 1324)   ceftriaxone (ROCEPHIN) 1 g/50 mL in dextrose IVPB (1,000 mg Intravenous New Bag 3/9/21 1426)   sodium chloride 0 9 % bolus 1,000 mL (1,000 mL Intravenous New Bag 3/9/21 1417)   fluconazole (DIFLUCAN) tablet 200 mg (200 mg Oral Given 3/9/21 1415)   acetaminophen (TYLENOL) tablet 975 mg (975 mg Oral Given 3/9/21 1416)           FINAL IMPRESSION    Final diagnoses:   Dehydration   UTI (urinary tract infection)   Chronic renal failure   Liver cancer (HCC)   Chronic bilateral pleural effusions   Ascites         DISPOSITION/PLAN    Presentation as above felt most consistent with dehydration and UTI in the setting of metastatic liver cancer and chronic renal failure  Vital signs were remarkable for low blood pressure that improved with resuscitation, examination nonspecific , specimen taken with new catheter consistent with infection, based on history of hypotension, fatigue, confusion, some concern for UTI  Treated as above, admitted Internal Medicine, hemodynamically stable and comfortable at that time    Time reflects when diagnosis was documented in both MDM as applicable and the Disposition within this note     Time User Action Codes Description Comment    3/9/2021  2:26 PM [employer] Phil Add [E86 0] Dehydration     3/9/2021  2:26 PM [employer] 3 Add [N39 0] UTI (urinary tract infection)     3/9/2021  2:26 PM Valetta Ganadiney Modify [E86 0] Dehydration     3/9/2021  2:26 PM Alma  T Modify [N39 0] UTI (urinary tract infection)     3/9/2021  2:26 PM Alma  T Add [N18 9] Chronic renal failure     3/9/2021  2:27 PM Alma  T Add [C78 7] Metastatic cancer to liver ([email or web address] )     3/9/2021  2:27 PM Alma  T Remove [C78 7] Metastatic cancer to liver ([facility] [address] )     3/9/2021  2:27 PM Alma  T Add [C22 9] Liver cancer ([email or web address] )     3/9/2021  2:27 PM Valetta Gaudy Add [J90] Chronic bilateral pleural effusions     3/9/2021  2:27 PM Alma  T Add [R18 8] Ascites       ED Disposition     ED Disposition Condition Date/Time Comment    Admit Stable Tue Mar 9, 2021  2:26 PM Case was discussed with JUSTINE and the patient's admission status was agreed to be Admission Status: inpatient status to the service of Dr Komal Longoria   Follow-up Information    None           PATIENT REFERRED TO:    No follow-up provider specified  DISCHARGE MEDICATIONS:    Patient's Medications   Discharge Prescriptions    No medications on file       No discharge procedures on file           MD Tan Mccoy MD  03/09/21 0004

## 2021-03-09 NOTE — ASSESSMENT & PLAN NOTE
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

## 2021-03-09 NOTE — ASSESSMENT & PLAN NOTE
Patient had elevated blood pressure at home  At this find blood pressure is stable  Continue metoprolol and nifedipine

## 2021-03-09 NOTE — CASE MANAGEMENT
RECENT READMISSION: AGE/SEX/DX/OUTCOME  Patient is an 78 yo male with recent admission for general weakness and UTI, SP covid 19 1/21  Therapy rec for STR and patient and family declined  RESOURCES ON D/C (previous admission):  Rev Home health                  CURRENT F/U APPTS:  Recurrent blood transfusion : [facility] infusion center, 2/9  TCM 2/18 with [address] visit with Heme Onc on 2/17    REASON FOR READMISSION:  Patient seen by [facility]  Pt /87 and dehydrated  Patient has a UTI, and possible c diff     INTERVENTIONS: Patient needs IP admission for ID and GI work up    [id number] [address] DIVERTED: no

## 2021-03-09 NOTE — ASSESSMENT & PLAN NOTE
Patient has history of C diff 2 times in the past   He was treated with oral vancomycin  Now he is presenting for worsening diarrhea for past 3 days  Etiology of diarrhea can be carcinoid tumor  Patient mentioned he has diarrhea off and on for 2 months  Patient denied any abdominal pain, no fever  Mild leukocytosis 12 2  Check for C diff  Patient has leukocytosis, no abdominal pain  CT abdomen is negative for acute obstruction  Consult infectious disease doctor  Start on oral vancomycin  Consult ID and Gastroenterology for their expert opinion  IV hydration with normal saline  Contact precautions  Follow CBC

## 2021-03-09 NOTE — TELEPHONE ENCOUNTER
Spoke with Isidra Llanes, Patient advised to go to the emergency room, unable to transport to facility, EMS called to the patient's home to get him to the hospital

## 2021-03-09 NOTE — H&P
[address] [facility] [address], [place]  H&P- Mike Leon 1940, 79 y o  male MRN: [id number]  Unit/Bed#: JOE Encounter: [id number]  Primary Care Provider: RIKA Patel   Date and time admitted to hospital: 3/9/2021 11:26 AM    UTI (urinary tract infection) due to urinary indwelling catheter [facility])  Assessment & Plan  Patient has chronic Haile catheter in place  History of the current UTI in the past   Treated with IV meropenem in the past   Previous history of ESBL E coli, Klebsiella, E faecalis  Urine culture sensitive to ceftriaxone  Continue ceftriaxone  Urinalysis positive for yeast, continue fluconazole  Follow urine culture  Consult ID  Continue to monitor closely      Clostridium difficile diarrhea  Assessment & Plan  Patient has history of C diff 2 times in the past   He was treated with oral vancomycin  Now he is presenting for worsening diarrhea for past 3 days  Etiology of diarrhea can be carcinoid tumor  Patient mentioned he has diarrhea off and on for 2 months  Patient denied any abdominal pain, no fever  Mild leukocytosis 12 2  Check for C diff  Patient has leukocytosis, no abdominal pain  CT abdomen is negative for acute obstruction  Consult infectious disease doctor  Start on oral vancomycin  Consult ID and Gastroenterology for their expert opinion  IV hydration with normal saline  Contact precautions  Follow CBC  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  CT abdomen showed the progression of hepatic lesions  Small abdominal ascites, no obstruction  Refer to full report for further details  Patient follow-up with oncologist Dr Amna De La Vega , he is on chemotherapy lanreotide every 4 weeks  Patient is due to get his injection on 03/08/2021  Continue to monitor  Pleural effusion  Assessment & Plan  Bilateral pleural effusions (right larger than left)  Stable    Breathing on room air     Anemia  Assessment & Plan  Anemia secondary to CKD  Hemoglobin stable  Continue to monitor  A-fib [facility])  Assessment & Plan  Patient has history of AFib  He is not on anticoagulation given his tumor, AH, risk for fall  Continue to monitor on telemetry  Continue metoprolol  At this point patient is rate controlled  CKD (chronic kidney disease) stage 5, GFR less than 15 ml/min [facility])  Assessment & Plan  Lab Results   Component Value Date    EGFR 10 03/09/2021    EGFR 10 02/14/2021    EGFR 11 02/13/2021    CREATININE 5 00 (H) 03/09/2021    CREATININE 4 92 (H) 02/14/2021    CREATININE 4 74 (H) 02/13/2021   Baseline creatinine 4 5-5 3  Today creatinine is 5  Continue to monitor closely  Essential hypertension  Assessment & Plan  Patient had elevated blood pressure at home  At this find blood pressure is stable  Continue metoprolol and nifedipine  VTE Prophylaxis: Heparin  / sequential compression device   Code Status:  Full code      Anticipated Length of Stay:  Patient will be admitted on an Inpatient basis with an anticipated length of stay of   2 midnights  Justification for Hospital Stay:  UTI, diarrhea    Total Time for Visit, including Counseling / Coordination of Care: 1 hour  Greater than 50% of this total time spent on direct patient counseling and coordination of care  Chief Complaint:   Diarrhea and elevated blood pressure    History of Present Illness:    Sushil Pedroza is a 79 y o  male with past medical history of hypertension, CAD, diabetes mellitus, anemia, CKD stage 5, not on hemodialysis, AFib, not on anticoagulation, history of recurrent UTI secondary to chronic urinary catheter, neuroendocrine tumor/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, history of C diff presented to emergency department with complaint of worsening diarrhea    Patient has diarrhea going on for 2 months, today he is sent by home health nurse, diarrhea got worsened 2 days ago, he is having 10-12 bowel movements every day, watery in consistency  Denied any abdominal pain, no nausea or vomiting  Denied any fever  Patient denied headache, shortness of breath, chest pain, urinary complaints  Patient has chronic Haile catheter and history of frequent UTI with culture positive for ESBL E coli , E faecalis, Klebsiella  Two episodes of C diff infection treated with p o  Vancomycin  In ED vitals were stable  Urinalysis positive for infection, lactic acid 2, creatinine 5 baseline is around 4 5-5 3   BUN is elevated indicating that patient is dehydrated  Bite count 12 28, anion gap 20  Hemoglobin 11 5 which is on baseline  Urine is also growing E cell  No ketones in the urine are noted  CT abdomen show progression of hepatic lesions with carcinoid tumor, bilateral effusion right greater than left small abdominal ascites with no small-bowel obstruction  In ED patient received 1 dose of IV ceftriaxone and fluconazole  Off note patient was admitted in January 2021 for pneumonia secondary to COVID-19 infection    He was again admitted in February 2021 for dehydration and Julian I        Review of Systems:    Review of Systems    Past Medical and Surgical History:     Past Medical History:   Diagnosis Date    Acute pancreatitis without infection or necrosis 9/26/2019    Age-related nuclear cataract, right 11/5/2020    Anemia of chronic renal failure     BPH (benign prostatic hyperplasia)     CKD (chronic kidney disease) stage 5, GFR less than 15 ml/min (HCC)     Coronary artery disease     COVID-19     DJD (degenerative joint disease)     Essential hypertension     Gait disturbance     Generalized weakness     GERD (gastroesophageal reflux disease)     Gout     History of transfusion     Hydronephrosis     Hyperlipidemia     Hypertension     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)     Pressure injury of skin     Proteinuria     Sepsis ([address] ) 3/11/2020    Thrombophlebitis migrans     Type 2 diabetes mellitus        Past Surgical History:   Procedure Laterality Date    CATARACT EXTRACTION Right 11/05/2020    CATARACT EXTRACTION W/ INTRAOCULAR LENS IMPLANT Right 11/5/2020    Procedure: EYE OD CATARACT EXTRACTION WITH IOL INSERTION;  Surgeon: Ian Hightower MD;  Location: [facility] MAIN OR;  Service: Ophthalmology    CHOLECYSTECTOMY     [facility] N/A 2/7/2020    Procedure: [address] [place] [address];  Surgeon: Geno Pena MD;  Location: [facility] MAIN OR;  Service: General    CORONARY ANGIOPLASTY WITH STENT PLACEMENT      FL RETROGRADE PYELOGRAM  5/21/2020    IR BIOPSY LIVER MASS  1/24/2019    IR CHOLECYSTOSTOMY TUBE PLACEMENT  9/6/2019    IR NON-TUNNELED CENTRAL LINE PLACEMENT  1/28/2021    NE CYSTO/URETERO W/LITHOTRIPSY &INDWELL STENT INSRT Left 5/21/2020    Procedure: CYSTOSCOPY URETEROSCOPY WITH LITHOTRIPSY HOLMIUM LASER, RETROGRADE PYELOGRAM AND INSERTION STENT URETERAL;  Surgeon: Augusta Reyes MD;  Location: [facility];  Service: Urology    NE CYSTOURETHROSCOPY,URETER CATHETER Left 4/21/2020    Procedure: CYSTOSCOPY WITH INSERTION STENT URETERAL;  Surgeon: Huma Velásquez MD;  [facility];  Service: Urology       Meds/Allergies:    Prior to Admission medications    Medication Sig Start Date End Date Taking?  Authorizing Provider   acetaminophen (TYLENOL) 325 mg tablet Take 650 mg by mouth every 6 (six) hours as needed     Historical Provider, MD   ascorbic acid (VITAMIN C) 1000 MG tablet Take 1 tablet (1,000 mg total) by mouth every 12 (twelve) hours for 6 doses 1/28/21   Josefa Bermudez MD   aspirin 81 MG tablet Take 81 mg by mouth daily    Historical Provider, MD   B Complex-C-Folic Acid (triphrocaps) 1 MG CAPS Take 1 capsule (1 mg total) by mouth daily with dinner 2/4/21   Layo Moulding,    cholecalciferol (VITAMIN D3) 1,000 units tablet Take 1,000 Units by mouth daily Take 2    Historical Provider, MD   famotidine (PEPCID) 10 mg tablet Take 1 tablet (10 mg total) by mouth daily at bedtime 8/11/20   RIKA Barajas   magnesium oxide (MAG-OX) 400 mg Take 0 5 tablets (200 mg total) by mouth 2 (two) times a day 1/20/21 2/23/21  Edith Schumacher DO   metoprolol tartrate (LOPRESSOR) 25 mg tablet Take 0 5 tablets (12 5 mg total) by mouth every 12 (twelve) hours 2/18/21   RIKA Armstrong   NIFEdipine ER (ADALAT CC) 60 MG 24 hr tablet Take 1 tablet (60 mg total) by mouth daily 2/23/21   RIKA Barker   pantoprazole (PROTONIX) 40 mg tablet Take 1 tablet (40 mg total) by mouth daily 11/10/20   RIKA Barker   sodium bicarbonate 650 mg tablet Take 325 mg by mouth 2 (two) times a day     Historical Provider, MD   sucralfate (CARAFATE) 1 g/10 mL suspension Take 10 mL (1,000 mg total) by mouth every 6 (six) hours  Patient not taking: Reported on 2/23/2021 8/11/20   RIKA Barajas   tamsulosin [facility]) 0 4 mg Take 1 capsule (0 4 mg total) by mouth daily with dinner 2/4/21   Edith Schumacher DO   vitamin B-12 (VITAMIN B-12) 1,000 mcg tablet Take 100 mcg by mouth daily     Historical Provider, MD   zinc sulfate (ZINCATE) 220 mg capsule Take 1 capsule (220 mg total) by mouth daily for 3 doses 1/29/21 2/23/21  Karen Mitchell MD     I have reviewed home medications with patient personally  Allergies: No Known Allergies    Social History:     Marital Status:     Substance Use History:   Social History     Substance and Sexual Activity   Alcohol Use Never    Alcohol/week: 0 0 standard drinks    Frequency: Never     Social History     Tobacco Use   Smoking Status Never Smoker   Smokeless Tobacco Never Used     Social History     Substance and Sexual Activity   Drug Use Never       Family History:    non-contributory    Physical Exam:     Vitals:   Blood Pressure: 136/67 (03/09/21 1300)  Pulse: 67 (03/09/21 1300)  Temperature: 97 8 °F (36 6 °C) (03/09/21 1302)  Temp Source: Oral (03/09/21 1302)  Respirations: 20 (03/09/21 1300)  SpO2: 95 % (03/09/21 1300)    Physical Exam  Constitutional:       Appearance: Normal appearance  HENT:      Head: Normocephalic and atraumatic  Nose: Nose normal    Neck:      Musculoskeletal: Normal range of motion  Cardiovascular:      Rate and Rhythm: Normal rate and regular rhythm  Pulmonary:      Effort: Pulmonary effort is normal       Breath sounds: No wheezing  Abdominal:      General: Abdomen is flat  Bowel sounds are normal  There is no distension  Palpations: Abdomen is soft  Tenderness: There is no abdominal tenderness  Musculoskeletal: Normal range of motion  Right lower leg: No edema  Left lower leg: No edema  Skin:     General: Skin is warm  Capillary Refill: Capillary refill takes less than 2 seconds  Neurological:      General: No focal deficit present  Mental Status: He is alert and oriented to person, place, and time  Mental status is at baseline  Psychiatric:         Mood and Affect: Mood normal          Behavior: Behavior normal        Additional Data:     Lab Results: I have personally reviewed pertinent reports        Results from last 7 days   Lab Units 03/09/21  1235   WBC Thousand/uL 12 28*   HEMOGLOBIN g/dL 11 5*   HEMATOCRIT % 34 7*   PLATELETS Thousands/uL 213   NEUTROS PCT % 76*   LYMPHS PCT % 15   MONOS PCT % 6   EOS PCT % 1     Results from last 7 days   Lab Units 03/09/21  1235   SODIUM mmol/L 140   POTASSIUM mmol/L 3 6   CHLORIDE mmol/L 97*   CO2 mmol/L 23   BUN mg/dL 48*   CREATININE mg/dL 5 00*   ANION GAP mmol/L 20*   CALCIUM mg/dL 8 8   ALBUMIN g/dL 3 0*   TOTAL BILIRUBIN mg/dL 0 67   ALK PHOS U/L 197*   ALT U/L 27   AST U/L 26   GLUCOSE RANDOM mg/dL 209*     Results from last 7 days   Lab Units 03/09/21  1235   INR  0 95     Results from last 7 days   Lab Units 03/09/21  1141   POC GLUCOSE mg/dl 230*         Results from last 7 days   Lab Units 03/09/21  1249   LACTIC ACID mmol/L 2 0       Imaging: I have personally reviewed pertinent reports  CT abdomen pelvis wo contrast   Final Result by Sandra Kilgore DO (03/09 1329)      1  Apparent progression of numerous hepatic lesions likely secondary to metastatic disease in the setting of carcinoid syndrome  2  Stable, partially calcified dominant mesenteric mass presumably carcinoid tumor  3  Stable omental nodularity in keeping with peritoneal carcinomatosis  4  No evidence of bowel obstruction  Questionable thickening of the sigmoid colon which may be artifactual due to noncontrast imaging  No pericolonic inflammatory changes  5  Small amount of abdominal ascites and bilateral pleural effusions (right larger than left), similar  Limited study without contrast       Workstation performed: [id number]         XR chest portable    (Results Pending)       EKG, Pathology, and Other Studies Reviewed on Admission:   · EKG:  Normal sinus rhythm with left axis deviation, no acute ST changes    Allscripts / Epic Records Reviewed: Yes     ** Please Note: This note has been constructed using a voice recognition system   **

## 2021-03-09 NOTE — ASSESSMENT & PLAN NOTE
Patient has history of AFib  He is not on anticoagulation given his tumor, AH, risk for fall  Continue to monitor on telemetry  Continue metoprolol  At this point patient is rate controlled

## 2021-03-10 PROBLEM — R03.1 INCIDENTAL FINDING OF LOW BLOOD PRESSURE: Status: ACTIVE | Noted: 2021-01-01

## 2021-03-10 NOTE — CONSULTS
Consultation - [facility]  Tri Cervantes 79 y o  male MRN: [id number]  Unit/Bed#: -01 Encounter: [id number]         Reason for Consult / Principal Problem:  Acute on chronic diarrhea, history of neuroendocrine tumor    HPI:  Tammy Hernandez is an 65-year-old male with history of neuroendocrine tumor with metastatic disease to the liver on monthly lanreotide, hypertension, CKD stage 5, atrial fibrillation, type 2 diabetes, CAD, and previous C diff  Patient presented to the emergency room yesterday for worsening diarrhea  Patient reports that his bowel frequency is now every 1-2 hours  He reports that this began yesterday, although ED documentation reports that this has been happening for about 3 weeks now  He denies signs of overt GI bleeding  No fever on admission  CT scan noting possibility of sigmoid thickening, but may be secondary to under distention  Known progression of neuroendocrine tumor seen with numerous hepatic lesions  Patient also with stable appearing mesenteric mass  Procalcitonin negative  Slight leukocytosis of 12,000 on admission  Patient was seen examined the bedside this morning, and reports that his bowel frequency is the same  C diff PCR is pending  Infectious Disease has also been consulted    Patient reports that he has had an EGD and colonoscopy in the past, however records are not available to me  Review of Systems:    CONSTITUTIONAL: Denies any fever, chills, or rigors  Good appetite, and no recent weight loss  HEENT: No earache or tinnitus  Denies hearing loss or visual disturbances  CARDIOVASCULAR: No chest pain or palpitations  RESPIRATORY: Denies any cough, hemoptysis, shortness of breath or dyspnea on exertion  GASTROINTESTINAL: As noted in the History of Present Illness  GENITOURINARY: No problems with urination  Denies any hematuria or dysuria  NEUROLOGIC: No dizziness or vertigo, denies headaches     MUSCULOSKELETAL: Denies any muscle or joint pain  SKIN: Denies skin rashes or itching  ENDOCRINE: Denies excessive thirst  Denies intolerance to heat or cold  PSYCHOSOCIAL: Denies depression or anxiety  Denies any recent memory loss  Historical Information   Past Medical History:   Diagnosis Date    Acute pancreatitis without infection or necrosis 9/26/2019    Age-related nuclear cataract, right 11/5/2020    Anemia of chronic renal failure     BPH (benign prostatic hyperplasia)     CKD (chronic kidney disease) stage 5, GFR less than 15 ml/min (HCC)     Coronary artery disease     COVID-19     DJD (degenerative joint disease)     Essential hypertension     Gait disturbance     Generalized weakness     GERD (gastroesophageal reflux disease)     Gout     History of transfusion     Hydronephrosis     Hyperlipidemia     Hypertension     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)     Pressure injury of skin     Proteinuria     Sepsis ([address] ) 3/11/2020    Thrombophlebitis migrans     Type 2 diabetes mellitus      Past Surgical History:   Procedure Laterality Date    CATARACT EXTRACTION Right 11/05/2020    CATARACT EXTRACTION W/ INTRAOCULAR LENS IMPLANT Right 11/5/2020    Procedure: EYE OD CATARACT EXTRACTION WITH IOL INSERTION;  Surgeon: Bethany Mcintosh MD;  Location: [facility] MAIN OR;  Service: Ophthalmology    CHOLECYSTECTOMY     Pasquale Pina N/A 2/7/2020    Procedure: CHOLECYSTECTOMY LAPAROSCOPIC;  Surgeon:  Hazel Corley MD;  Location: [facility] MAIN OR;  Service: General    CORONARY ANGIOPLASTY WITH STENT PLACEMENT      FL RETROGRADE PYELOGRAM  5/21/2020    IR BIOPSY LIVER MASS  1/24/2019    IR CHOLECYSTOSTOMY TUBE PLACEMENT  9/6/2019    IR NON-TUNNELED CENTRAL LINE PLACEMENT  1/28/2021    VT CYSTO/URETERO W/LITHOTRIPSY &INDWELL STENT INSRT Left 5/21/2020    Procedure: CYSTOSCOPY URETEROSCOPY WITH LITHOTRIPSY HOLMIUM LASER, RETROGRADE PYELOGRAM AND INSERTION STENT URETERAL;  Surgeon: Barbara Escobar Isirdo Elmore MD;  [facility];  Service: Urology    MS CYSTOURETHROSCOPY,URETER CATHETER Left 4/21/2020    Procedure: CYSTOSCOPY WITH INSERTION STENT URETERAL;  Surgeon: Huma Velásquez MD;  [facility];  Service: Urology     Social History   Social History     Substance and Sexual Activity   Alcohol Use Never    Alcohol/week: 0 0 standard drinks    Frequency: Never     Social History     Substance and Sexual Activity   Drug Use Never     Social History     Tobacco Use   Smoking Status Never Smoker   Smokeless Tobacco Never Used     Family History   Problem Relation Age of Onset    Cancer Mother     Hypertension Father     Cancer Brother     Cancer Maternal Grandmother     Cancer Paternal Aunt         Meds/Allergies     Current Facility-Administered Medications   Medication Dose Route Frequency    acetaminophen (TYLENOL) tablet 650 mg  650 mg Oral Q6H PRN    aspirin chewable tablet 81 mg  81 mg Oral Daily    heparin (porcine) subcutaneous injection 5,000 Units  5,000 Units Subcutaneous Q8H [facility]    magnesium oxide (MAG-OX) tablet 200 mg  200 mg Oral BID    metoprolol tartrate (LOPRESSOR) partial tablet 12 5 mg  12 5 mg Oral Q12H [facility]    NIFEdipine (PROCARDIA XL) 24 hr tablet 60 mg  60 mg Oral Daily    ondansetron (ZOFRAN) injection 4 mg  4 mg Intravenous Q6H PRN    sodium bicarbonate tablet 325 mg  325 mg Oral BID    sodium chloride (PF) 0 9 % injection 3 mL  3 mL Intravenous Q12H [facility]    sodium chloride 0 9 % infusion  75 mL/hr Intravenous Continuous    vancomycin (VANCOCIN) oral solution 125 mg  125 mg Oral Q6H [facility]       No Known Allergies      Objective     Blood pressure (!) 178/88, pulse 75, temperature 97 5 °F (36 4 °C), resp  rate 17, height 5' 8" (1 727 m), weight 71 6 kg (157 lb 13 6 oz), SpO2 95 %        Intake/Output Summary (Last 24 hours) at 3/10/2021 1252  Last data filed at 3/10/2021 0752  Gross per 24 hour   Intake 3903 ml   Output 1200 ml   Net 2703 ml         PHYSICAL EXAM:      General Appearance:   Alert and oriented x 3  Cooperative, and in no respiratory distress   HEENT:   Normocephalic, atraumatic, anicteric      Neck:  Supple, symmetrical, trachea midline   Lungs:   Clear to auscultation bilaterally; no rales, rhonchi or wheezing; respirations unlabored    Heart[de-identified]   S1 and S2 normal; regular rate and rhythm; no murmur, rub, or gallop  Abdomen:   Soft, non-tender, non-distended; normal bowel sounds; no masses, no organomegaly    Genitalia:   Deferred    Rectal:   Deferred    Extremities:  No cyanosis, clubbing or edema    Pulses:  2+ and symmetric all extremities    Skin:  Skin color, texture, turgor normal, no rashes or lesions    Lymph nodes:  No palpable cervical or supraclavicular lymphadenopathy        Lab Results:   Results from last 7 days   Lab Units 03/10/21  0433   WBC Thousand/uL 8 15   HEMOGLOBIN g/dL 9 4*   HEMATOCRIT % 28 3*   PLATELETS Thousands/uL 142*   NEUTROS PCT % 73   LYMPHS PCT % 15   MONOS PCT % 9   EOS PCT % 2     Results from last 7 days   Lab Units 03/10/21  0433   POTASSIUM mmol/L 3 6   CHLORIDE mmol/L 105   CO2 mmol/L 25   BUN mg/dL 47*   CREATININE mg/dL 4 88*   CALCIUM mg/dL 8 2*   ALK PHOS U/L 154*   ALT U/L 19   AST U/L 17     Results from last 7 days   Lab Units 03/09/21  1235   INR  0 95           Imaging Studies: I have personally reviewed pertinent imaging studies  Ct Abdomen Pelvis Wo Contrast    Result Date: 3/9/2021  Impression: 1  Apparent progression of numerous hepatic lesions likely secondary to metastatic disease in the setting of carcinoid syndrome  2  Stable, partially calcified dominant mesenteric mass presumably carcinoid tumor  3  Stable omental nodularity in keeping with peritoneal carcinomatosis  4  No evidence of bowel obstruction  Questionable thickening of the sigmoid colon which may be artifactual due to noncontrast imaging  No pericolonic inflammatory changes   5  Small amount of abdominal ascites and bilateral pleural effusions (right larger than left), similar  Limited study without contrast  Workstation performed: [id number]     Xr Chest Portable    Result Date: 3/9/2021  Impression: Findings suspicious for evolving right lower lobe infiltrate  The study was marked in [facility] for immediate notification  Workstation performed: [id number]       ASSESSMENT and PLAN:      1) Acute on chronic diarrhea in the setting of neuroendocrine tumor - Patient is on lanreotide monthly for treatment of carcinoid syndrome  His creatinine seems to be at baseline, no hypokalemia  - Will check C diff PCR to ensure no infectious etiology in the setting of recurrent hospitalizations  - He is already on Vanco prophylaxis  - Would recommend consulting Oncology after discussion with my attending  - Depending on infectious stool study results, would recommend antidiarrheal such as Lomotil or Imodium    The patient was seen and examined by Dr Damián Hopkins, all fowler medical decisions were made with Dr Damián Hopkins  Thank you for allowing us to participate in the care of this pleasant patient  We will follow up with you closely

## 2021-03-10 NOTE — ASSESSMENT & PLAN NOTE
Lab Results   Component Value Date    EGFR 10 03/10/2021    EGFR 10 03/09/2021    EGFR 10 02/14/2021    CREATININE 4 88 (H) 03/10/2021    CREATININE 5 00 (H) 03/09/2021    CREATININE 4 92 (H) 02/14/2021     Baseline creatinine 4 5-5 3      Not on hemodialysis  Currently receiving IV hydration with normal saline at 125 cc/hour, 1200 mL of urine past 24 hours  Continue to monitor BMP

## 2021-03-10 NOTE — PLAN OF CARE
Problem: Potential for Falls  Goal: Patient will remain free of falls  Description: INTERVENTIONS:  - Assess patient frequently for physical needs  -  Identify cognitive and physical deficits and behaviors that affect risk of falls    -  [place] fall precautions as indicated by assessment   - Educate patient/family on patient safety including physical limitations  - Instruct patient to call for assistance with activity based on assessment  - Modify environment to reduce risk of injury  - Consider OT/PT consult to assist with strengthening/mobility  Outcome: Progressing     Problem: Prexisting or High Potential for Compromised Skin Integrity  Goal: Skin integrity is maintained or improved  Description: INTERVENTIONS:  - Identify patients at risk for skin breakdown  - Assess and monitor skin integrity  - Assess and monitor nutrition and hydration status  - Monitor labs   - Assess for incontinence   - Turn and reposition patient  - Assist with mobility/ambulation  - Relieve pressure over bony prominences  - Avoid friction and shearing  - Provide appropriate hygiene as needed including keeping skin clean and dry  - Evaluate need for skin moisturizer/barrier cream  - Collaborate with interdisciplinary team   - Patient/family teaching  - Consider wound care consult   Outcome: Progressing     Problem: PAIN - ADULT  Goal: Verbalizes/displays adequate comfort level or baseline comfort level  Description: Interventions:  - Encourage patient to monitor pain and request assistance  - Assess pain using appropriate pain scale  - Administer analgesics based on type and severity of pain and evaluate response  - Implement non-pharmacological measures as appropriate and evaluate response  - Consider cultural and social influences on pain and pain management  - Notify physician/advanced practitioner if interventions unsuccessful or patient reports new pain  Outcome: Progressing     Problem: INFECTION - ADULT  Goal: Absence or prevention of progression during hospitalization  Description: INTERVENTIONS:  - Assess and monitor for signs and symptoms of infection  - Monitor lab/diagnostic results  - Monitor all insertion sites, i e  indwelling lines, tubes, and drains  - Monitor endotracheal if appropriate and nasal secretions for changes in amount and color  - [place] appropriate cooling/warming therapies per order  - Administer medications as ordered  - Instruct and encourage patient and family to use good hand hygiene technique  - Identify and instruct in appropriate isolation precautions for identified infection/condition  Outcome: Progressing  Goal: Absence of fever/infection during neutropenic period  Description: INTERVENTIONS:  - Monitor WBC    Outcome: Progressing     Problem: SAFETY ADULT  Goal: Patient will remain free of falls  Description: INTERVENTIONS:  - Assess patient frequently for physical needs  -  Identify cognitive and physical deficits and behaviors that affect risk of falls    -  [place] fall precautions as indicated by assessment   - Educate patient/family on patient safety including physical limitations  - Instruct patient to call for assistance with activity based on assessment  - Modify environment to reduce risk of injury  - Consider OT/PT consult to assist with strengthening/mobility  Outcome: Progressing  Goal: Maintain or return to baseline ADL function  Description: INTERVENTIONS:  -  Assess patient's ability to carry out ADLs; assess patient's baseline for ADL function and identify physical deficits which impact ability to perform ADLs (bathing, care of mouth/teeth, toileting, grooming, dressing, etc )  - Assess/evaluate cause of self-care deficits   - Assess range of motion  - Assess patient's mobility; develop plan if impaired  - Assess patient's need for assistive devices and provide as appropriate  - Encourage maximum independence but intervene and supervise when necessary  - Involve family in performance of ADLs  - Assess for home care needs following discharge   - Consider OT consult to assist with ADL evaluation and planning for discharge  - Provide patient education as appropriate  Outcome: Progressing  Goal: Maintain or return mobility status to optimal level  Description: INTERVENTIONS:  - Assess patient's baseline mobility status (ambulation, transfers, stairs, etc )    - Identify cognitive and physical deficits and behaviors that affect mobility  - Identify mobility aids required to assist with transfers and/or ambulation (gait belt, sit-to-stand, lift, walker, cane, etc )  - [place] fall precautions as indicated by assessment  - Record patient progress and toleration of activity level on Mobility SBAR; progress patient to next Phase/Stage  - Instruct patient to call for assistance with activity based on assessment  - Consider rehabilitation consult to assist with strengthening/weightbearing, etc   Outcome: Progressing     Problem: DISCHARGE PLANNING  Goal: Discharge to home or other facility with appropriate resources  Description: INTERVENTIONS:  - Identify barriers to discharge w/patient and caregiver  - Arrange for needed discharge resources and transportation as appropriate  - Identify discharge learning needs (meds, wound care, etc )  - Arrange for interpretive services to assist at discharge as needed  - Refer to Case Management Department for coordinating discharge planning if the patient needs post-hospital services based on physician/advanced practitioner order or complex needs related to functional status, cognitive ability, or social support system  Outcome: Progressing     Problem: Knowledge Deficit  Goal: Patient/family/caregiver demonstrates understanding of disease process, treatment plan, medications, and discharge instructions  Description: Complete learning assessment and assess knowledge base    Interventions:  - Provide teaching at level of understanding  - Provide teaching via preferred learning methods  Outcome: Progressing

## 2021-03-10 NOTE — PROGRESS NOTES
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  CT abdomen showed the progression of hepatic lesions  Small abdominal ascites, no obstruction  Refer to full report for further details  Patient follow-up with oncologist Dr Smita Moore , he is on chemotherapy lanreotide every 4 weeks  Continue to monitor  GI on board-no intervention from their end at this point    * Suspected C diff related diarrhea  Assessment & Plan  Patient has history of C diff 2 times in the past   He was treated with oral vancomycin  Now he is presenting for worsening diarrhea for past 3 days  Etiology of diarrhea can be carcinoid tumor  Patient mentioned he has diarrhea off and on for 2 months  Patient denied any abdominal pain or fever  Mild leukocytosis at presentation, trending down  Check for C diff  CT abdomen is negative for acute obstruction  Consult infectious disease   on oral vancomycin- day 2  No intervention from GI at this point  IV hydration with normal saline  Contact /hand hygiene precautions  Follow CBC  Pleural effusion  Assessment & Plan  Bilateral pleural effusions (right larger than left)  Stable as prior study  Stable on room air    Anemia  Assessment & Plan  Chronic multifactorial anemia  Hemoglobin stable at 9 4  Continue to monitor CBC in a m     UTI (urinary tract infection) due to chronic indwelling catheter [facility])  Assessment & Plan  Patient has chronic Haile catheter in place  History of recurrent UTI in the past   Treated with IV meropenem in past   Previous history of ESBL E coli, Klebsiella, E faecalis  Urine cultures were  sensitive to ceftriaxone  Continue ceftriaxone    Urinalysis positive for yeast, continue fluconazole  Follow urine culture  Consult ID  Monitor temperature/WBC      A-fib [facility])  Assessment & Plan  Patient has history of AFib  Currently rate controlled with metoprolol, not on any anticoagulation due to risk of falls/advanced age    CKD (chronic kidney disease) stage 5, GFR less than 15 ml/min [facility])  Assessment & Plan  Lab Results   Component Value Date    EGFR 10 03/10/2021    EGFR 10 2021    EGFR 10 2021    CREATININE 4 88 (H) 03/10/2021    CREATININE 5 00 (H) 2021    CREATININE 4 92 (H) 2021     Baseline creatinine 4 5-5 3  Not on hemodialysis  Currently receiving IV hydration with normal saline at 125 cc/hour, 1200 mL of urine past 24 hours  Continue to monitor BMP    Essential hypertension  Assessment & Plan  Patient had elevated blood pressure at home    Continue metoprolol and nifedipine with holding parameters  Blood pressure monitoring as per unit protocol        VTE Pharmacologic Prophylaxis:   Pharmacologic: Heparin  Mechanical VTE Prophylaxis in Place: Yes    Discussions with Specialists or Other Care Team Provider: yes    Education and Discussions with Family / Patient: yes    Current Length of Stay: 1 day(s)    Current Patient Status: Inpatient     Discharge Plan / Estimated Discharge Date: 24-72 hours    Code Status: Level 1 - Full Code      Subjective:   Patient was seen and examined by me at bedside  No active complaints  No acute overnight event reported    Objective:     Vitals:   Temp (24hrs), Av 6 °F (36 4 °C), Min:97 5 °F (36 4 °C), Max:97 8 °F (36 6 °C)    Temp:  [97 5 °F (36 4 °C)-97 8 °F (36 6 °C)] 97 5 °F (36 4 °C)  HR:  [68-75] 75  Resp:  [17-18] 17  BP: (117-178)/(69-94) 178/88  SpO2:  [95 %-98 %] 95 %  Body mass index is 24 kg/m²  Input and Output Summary (last 24 hours):        Intake/Output Summary (Last 24 hours) at 3/10/2021 1336  Last data filed at 3/10/2021 1333  Gross per 24 hour   Intake 4140 ml Output 2200 ml   Net 1940 ml       Physical Exam:     Physical Exam  Vitals signs reviewed  HENT:      Head: Normocephalic and atraumatic  Eyes:      General: No scleral icterus  Extraocular Movements: Extraocular movements intact  Neck:      Musculoskeletal: Normal range of motion  Cardiovascular:      Rate and Rhythm: Regular rhythm  Heart sounds: Normal heart sounds  Pulmonary:      Effort: Pulmonary effort is normal       Breath sounds: Normal breath sounds  Abdominal:      General: Bowel sounds are normal       Palpations: Abdomen is soft  Tenderness: There is no abdominal tenderness  Musculoskeletal:      Right lower leg: No edema  Left lower leg: No edema  Skin:     General: Skin is warm and dry  Capillary Refill: Capillary refill takes less than 2 seconds  Neurological:      Mental Status: He is alert and oriented to person, place, and time  Psychiatric:         Mood and Affect: Mood normal            Additional Data:     Labs:    Results from last 7 days   Lab Units 03/10/21  0433   WBC Thousand/uL 8 15   HEMOGLOBIN g/dL 9 4*   HEMATOCRIT % 28 3*   PLATELETS Thousands/uL 142*   NEUTROS PCT % 73   LYMPHS PCT % 15   MONOS PCT % 9   EOS PCT % 2     Results from last 7 days   Lab Units 03/10/21  0433   POTASSIUM mmol/L 3 6   CHLORIDE mmol/L 105   CO2 mmol/L 25   BUN mg/dL 47*   CREATININE mg/dL 4 88*   CALCIUM mg/dL 8 2*   ALK PHOS U/L 154*   ALT U/L 19   AST U/L 17     Results from last 7 days   Lab Units 03/09/21  1235   INR  0 95       * I Have Reviewed All Lab Data Listed Above  * Additional Pertinent Lab Tests Reviewed: All Labs Within Last 24 Hours Reviewed    Imaging:    Imaging Reports Reviewed Today Include: na  Imaging Personally Reviewed by Myself Includes:  na    Recent Cultures (last 7 days):     Results from last 7 days   Lab Units 03/09/21  1301 03/09/21  1249   BLOOD CULTURE  Received in Microbiology Lab  Culture in Progress   Received in Microbiology Lab  Culture in Progress  Last 24 Hours Medication List:   Current Facility-Administered Medications   Medication Dose Route Frequency Provider Last Rate    acetaminophen  650 mg Oral Q6H PRN Rodney Barrientos MD      aspirin  81 mg Oral Daily Rodney Barrientos MD      heparin (porcine)  5,000 Units Subcutaneous [facility] Rodney Barrientos MD      magnesium oxide  200 mg Oral BID Rodney Barrientos MD      metoprolol tartrate  12 5 mg Oral Q12H [address] Rodney Barrientos MD      NIFEdipine ER  60 mg Oral Daily Rodney Barrientos MD      ondansetron  4 mg Intravenous Q6H PRN Rodney Barrientos MD      sodium bicarbonate  325 mg Oral BID Rodney Barrientos MD      sodium chloride (PF)  3 mL Intravenous Q12H [address] Lila Martinez MD      sodium chloride  75 mL/hr Intravenous Continuous Rodney Barrientos MD 75 mL/hr (03/10/21 1001)    vancomycin  125 mg Oral Q6H [address] Rodney Barrientos MD          Today, Patient Was Seen By: Tod Castro MD    ** Please Note: This note has been constructed using a voice recognition system   **

## 2021-03-10 NOTE — ASSESSMENT & PLAN NOTE
Patient has history of AFib      Currently rate controlled with metoprolol, not on any anticoagulation due to risk of falls/advanced age

## 2021-03-10 NOTE — ASSESSMENT & PLAN NOTE
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

## 2021-03-10 NOTE — CASE MANAGEMENT
The pt is a 30 day re-admit  He was dehydrated and has a UTI    Cm met with the pt at the bedside to discuss dcp  The pt resides with his S/O Shruthi Espinoza in a 1 story home with a ramp to enter located in [facility]  The pt uses a RW, BSC and shower chair  The pt S/O Shruthi Espinoza provides him with assistance with ADLs  The pt is current with [facility] and would like to continue with them  Richi sent a referral to [facility] with [facility]  The pt has no Hx of STR, MH or SA  The pt fills Rx at [facility] and is able to afford his co-pays  The pt POA is his S/o Shruthi Espinoza and his son, Shayne Mukherjee  The pt is retired and his [address] transports him to and from [facility]        CM reviewed discharge planning process including the following: identifying caregivers at home, preference for d/c planning needs, availability of treatment team to discuss questions or concerns patient and/or family may have regarding diagnosis, plan of care, old or new medications and discharge planning  Discharge Checklist reviewed and CM will continue to monitor for progress toward discharge goals in nursing and provider rounds      The pt is current with [facility] and would like to [facility] [place]  [facility] submitted the referral   The pt S/O will transport him home at [place]

## 2021-03-10 NOTE — ASSESSMENT & PLAN NOTE
Patient had elevated blood pressure at home      Continue metoprolol and nifedipine with holding parameters  Blood pressure monitoring as per unit protocol

## 2021-03-10 NOTE — PLAN OF CARE
Problem: Potential for Falls  Goal: Patient will remain free of falls  Description: INTERVENTIONS:  - Assess patient frequently for physical needs  -  Identify cognitive and physical deficits and behaviors that affect risk of falls    -  [place] fall precautions as indicated by assessment   - Educate patient/family on patient safety including physical limitations  - Instruct patient to call for assistance with activity based on assessment  - Modify environment to reduce risk of injury  - Consider OT/PT consult to assist with strengthening/mobility  Outcome: Progressing     Problem: Prexisting or High Potential for Compromised Skin Integrity  Goal: Skin integrity is maintained or improved  Description: INTERVENTIONS:  - Identify patients at risk for skin breakdown  - Assess and monitor skin integrity  - Assess and monitor nutrition and hydration status  - Monitor labs   - Assess for incontinence   - Turn and reposition patient  - Assist with mobility/ambulation  - Relieve pressure over bony prominences  - Avoid friction and shearing  - Provide appropriate hygiene as needed including keeping skin clean and dry  - Evaluate need for skin moisturizer/barrier cream  - Collaborate with interdisciplinary team   - Patient/family teaching  - Consider wound care consult   Outcome: Progressing     Problem: PAIN - ADULT  Goal: Verbalizes/displays adequate comfort level or baseline comfort level  Description: Interventions:  - Encourage patient to monitor pain and request assistance  - Assess pain using appropriate pain scale  - Administer analgesics based on type and severity of pain and evaluate response  - Implement non-pharmacological measures as appropriate and evaluate response  - Consider cultural and social influences on pain and pain management  - Notify physician/advanced practitioner if interventions unsuccessful or patient reports new pain  Outcome: Progressing     Problem: INFECTION - ADULT  Goal: Absence or prevention of progression during hospitalization  Description: INTERVENTIONS:  - Assess and monitor for signs and symptoms of infection  - Monitor lab/diagnostic results  - Monitor all insertion sites, i e  indwelling lines, tubes, and drains  - Monitor endotracheal if appropriate and nasal secretions for changes in amount and color  - [place] appropriate cooling/warming therapies per order  - Administer medications as ordered  - Instruct and encourage patient and family to use good hand hygiene technique  - Identify and instruct in appropriate isolation precautions for identified infection/condition  Outcome: Progressing  Goal: Absence of fever/infection during neutropenic period  Description: INTERVENTIONS:  - Monitor WBC    Outcome: Progressing     Problem: SAFETY ADULT  Goal: Patient will remain free of falls  Description: INTERVENTIONS:  - Assess patient frequently for physical needs  -  Identify cognitive and physical deficits and behaviors that affect risk of falls    -  [place] fall precautions as indicated by assessment   - Educate patient/family on patient safety including physical limitations  - Instruct patient to call for assistance with activity based on assessment  - Modify environment to reduce risk of injury  - Consider OT/PT consult to assist with strengthening/mobility  Outcome: Progressing  Goal: Maintain or return to baseline ADL function  Description: INTERVENTIONS:  -  Assess patient's ability to carry out ADLs; assess patient's baseline for ADL function and identify physical deficits which impact ability to perform ADLs (bathing, care of mouth/teeth, toileting, grooming, dressing, etc )  - Assess/evaluate cause of self-care deficits   - Assess range of motion  - Assess patient's mobility; develop plan if impaired  - Assess patient's need for assistive devices and provide as appropriate  - Encourage maximum independence but intervene and supervise when necessary  - Involve family in performance of ADLs  - Assess for home care needs following discharge   - Consider OT consult to assist with ADL evaluation and planning for discharge  - Provide patient education as appropriate  Outcome: Progressing  Goal: Maintain or return mobility status to optimal level  Description: INTERVENTIONS:  - Assess patient's baseline mobility status (ambulation, transfers, stairs, etc )    - Identify cognitive and physical deficits and behaviors that affect mobility  - Identify mobility aids required to assist with transfers and/or ambulation (gait belt, sit-to-stand, lift, walker, cane, etc )  - [place] fall precautions as indicated by assessment  - Record patient progress and toleration of activity level on Mobility SBAR; progress patient to next Phase/Stage  - Instruct patient to call for assistance with activity based on assessment  - Consider rehabilitation consult to assist with strengthening/weightbearing, etc   Outcome: Progressing     Problem: DISCHARGE PLANNING  Goal: Discharge to home or other facility with appropriate resources  Description: INTERVENTIONS:  - Identify barriers to discharge w/patient and caregiver  - Arrange for needed discharge resources and transportation as appropriate  - Identify discharge learning needs (meds, wound care, etc )  - Arrange for interpretive services to assist at discharge as needed  - Refer to Case Management Department for coordinating discharge planning if the patient needs post-hospital services based on physician/advanced practitioner order or complex needs related to functional status, cognitive ability, or social support system  Outcome: Progressing

## 2021-03-10 NOTE — ASSESSMENT & PLAN NOTE
Patient has chronic Haile catheter in place  History of recurrent UTI in the past   Treated with IV meropenem in past   Previous history of ESBL E coli, Klebsiella, E faecalis  Urine cultures were  sensitive to ceftriaxone  Continue ceftriaxone  Urinalysis positive for yeast, continue fluconazole  Follow urine culture    Consult ID  Monitor temperature/WBC

## 2021-03-10 NOTE — ASSESSMENT & PLAN NOTE
Patient has history of C diff 2 times in the past   He was treated with oral vancomycin  Now he is presenting for worsening diarrhea for past 3 days  Etiology of diarrhea can be carcinoid tumor  Patient mentioned he has diarrhea off and on for 2 months  Patient denied any abdominal pain or fever  Mild leukocytosis at presentation, trending down  Check for C diff  CT abdomen is negative for acute obstruction  Consult infectious disease   on oral vancomycin- day 2  No intervention from GI at this point  IV hydration with normal saline  Contact /hand hygiene precautions  Follow CBC

## 2021-03-10 NOTE — UTILIZATION REVIEW
Initial Clinical Review    Admission: Date/Time/Statement:   Admission Orders (From admission, onward)     Ordered        03/09/21 1428  Inpatient Admission  Once                   Orders Placed This Encounter   Procedures    Inpatient Admission     Standing Status:   Standing     Number of Occurrences:   1     Order Specific Question:   Level of Care     Answer:   Med Surg [16]     Order Specific Question:   Estimated length of stay     Answer:   More than 2 Midnights     Order Specific Question:   Certification     Answer:   I certify that inpatient services are medically necessary for this patient for a duration of greater than two midnights  See H&P and MD Progress Notes for additional information about the patient's course of treatment  ED Arrival Information     Expected Arrival Acuity Means of Arrival Escorted By Service Admission Type    - 3/9/2021 11:26 Urgent Ambulance [address]  [facility] General Medicine Urgent    Arrival Complaint    -        Chief Complaint   Patient presents with    Diarrhea     Pt arrives via EMS due to the home health nurse calling for episode of diarrhea X3 weeks as well as hypotension  Pt has hx of liver cancer      Assessment/Plan:  78 yo male to ED from home via EMS   Diarrhea x2 months , 10-12 Bms a day   Hx of recurrent UTI sec to chronic catheter , hx of C-diff Urinalysis positive for infection, lactic acid 2, creatinine 5 baseline is around 4 5-5 3   BUN is elevated indicating that patient is dehydrated  Admitted IP status plan for IV abx for UTI , f/u ua cx , consult ID and monitor   Cdiff start po vanco , consult ID and GI , IVF , contact precautions   CKD creat 5 0 , baseline 4 5-5 3 cont to monitor   3/10 ID Consult   Chronic diarrhea , cont po vanco , f/u Cdiff if neg dc po vanco   GI following   Follow temp , check cbc , supportive care   Suspect chronic colonization versus contaminant    No signs or symptoms to suggest acute infection, DC abx , antifungals and monitor urine output   3/10 GI Note   Acute on chronic diarrhea in setting of neuro endocrine tumor  Check cdiff , on vanco prophy   Consult oncology   Depending on infectious stool study results, would recommend antidiarrheal such as Lomotil or Imodium    3/10 IM Note   Metastatic neuroendocrine tumor , monitor   On day 2 of vanco for cdiff   Cont IVF and follow CBC    UTI cont ceftriaxone , follow ua cx and monitor   Creat 4 88 , baseline 4 5-5 3 cont IVF , 1200 ml urine past 24 hrs   Monitor BMP   ED Triage Vitals   Temperature Pulse Respirations Blood Pressure SpO2   03/09/21 1302 03/09/21 1135 03/09/21 1135 03/09/21 1135 03/09/21 1135   97 8 °F (36 6 °C) 69 20 107/57 98 %      Temp Source Heart Rate Source Patient Position - Orthostatic VS BP Location FiO2 (%)   03/09/21 1302 03/09/21 1135 03/09/21 1545 03/09/21 1135 --   Oral Monitor Lying Right arm       Pain Score       03/09/21 1416       Worst Possible Pain          Wt Readings from Last 1 Encounters:   03/10/21 71 6 kg (157 lb 13 6 oz)     Additional Vital Signs:   03/10/21 0752  --  --  --  --  --  --  None (Room air)  --   03/10/21 06:40:02  97 5 °F (36 4 °C)  75  17  178/88Abnormal   118  95 %  --  --   03/09/21 23:11:11  97 8 °F (36 6 °C)  68  17  152/78  103  98 %  --  --   03/09/21 21:30:52  --  71  --  117/94  102  95 %  --  --   03/09/21 15:45:11  97 6 °F (36 4 °C)  74  18  147/69  95  97 %  None (Room air)  Lying   03/09/21 1302  97 8 °F (36 6 °C)  --  --  --  --  --  --  --   03/09/21 1300  --  67  20  136/67  92  95 %  None (Room air)  --   03/09/21 1200  --  65  16  103/56  74  97 %  None (Room air)  --   03/09/21 1147  --  --  --  --  --  --  None (Room air         Pertinent Labs/Diagnostic Test Results:   3/9 PCXR Findings suspicious for evolving right lower lobe infiltrate  3/9 CT abd1   Apparent progression of numerous hepatic lesions likely secondary to metastatic disease in the setting of carcinoid syndrome    2  Stable, partially calcified dominant mesenteric mass presumably carcinoid tumor    3  Stable omental nodularity in keeping with peritoneal carcinomatosis     4  No evidence of bowel obstruction  Questionable thickening of the sigmoid colon which may be artifactual due to noncontrast imaging    No pericolonic inflammatory changes    5  Small amount of abdominal ascites and bilateral pleural effusions (right larger than left), similar         Results from last 7 days   Lab Units 03/10/21  0433 03/09/21  1235   WBC Thousand/uL 8 15 12 28*   HEMOGLOBIN g/dL 9 4* 11 5*   HEMATOCRIT % 28 3* 34 7*   PLATELETS Thousands/uL 142* 213   NEUTROS ABS Thousands/µL 5 97 9 46*     Results from last 7 days   Lab Units 03/10/21  0433 03/09/21  1235   SODIUM mmol/L 141 140   POTASSIUM mmol/L 3 6 3 6   CHLORIDE mmol/L 105 97*   CO2 mmol/L 25 23   ANION GAP mmol/L 11 20*   BUN mg/dL 47* 48*   CREATININE mg/dL 4 88* 5 00*   EGFR ml/min/1 73sq m 10 10   CALCIUM mg/dL 8 2* 8 8   MAGNESIUM mg/dL 2 1  --      Results from last 7 days   Lab Units 03/10/21  0433 03/09/21  1235   AST U/L 17 26   ALT U/L 19 27   ALK PHOS U/L 154* 197*   TOTAL PROTEIN g/dL 6 0* 6 9   ALBUMIN g/dL 2 3* 3 0*   TOTAL BILIRUBIN mg/dL 0 51 0 67     Results from last 7 days   Lab Units 03/09/21  1141   POC GLUCOSE mg/dl 230*     Results from last 7 days   Lab Units 03/10/21  0433 03/09/21  1235   GLUCOSE RANDOM mg/dL 136 209*     BETA-HYDROXYBUTYRATE   Date Value Ref Range Status   02/05/2019 0 11 0 02 - 0 27 mmol/L Final      Results from last 7 days   Lab Units 03/09/21  1235   CK TOTAL U/L 35*     Results from last 7 days   Lab Units 03/09/21  1235   TROPONIN I ng/mL <0 02     Results from last 7 days   Lab Units 03/09/21  1235   PROTIME seconds 12 9   INR  0 95   PTT seconds 30     Results from last 7 days   Lab Units 03/10/21  0433 03/09/21  1235   PROCALCITONIN ng/ml 0 11 0 11     Results from last 7 days   Lab Units 03/09/21  1249   LACTIC ACID mmol/L 2 0     Results from last 7 days   Lab Units 03/09/21  1320   CLARITY UA  Turbid   COLOR UA  Yellow   SPEC GRAV UA  1 025   PH UA  5 5   GLUCOSE UA mg/dl Negative   KETONES UA mg/dl Negative   BLOOD UA  Large*   PROTEIN UA mg/dl 100 (2+)*   NITRITE UA  Negative   BILIRUBIN UA  Negative   UROBILINOGEN UA E U /dl 0 2   LEUKOCYTES UA  Moderate*   WBC UA /hpf Innumerable*   RBC UA /hpf 10-20*   BACTERIA UA /hpf Moderate*   EPITHELIAL CELLS WET PREP /hpf Occasional       Results from last 7 days   Lab Units 03/09/21  1301 03/09/21  1249   BLOOD CULTURE  Received in Microbiology Lab  Culture in Progress  Received in Microbiology Lab  Culture in Progress       ED Treatment:   Medication Administration from 03/09/2021 1126 to 03/09/2021 1539       Date/Time Order Dose Route Action     03/09/2021 1324 sodium chloride (PF) 0 9 % injection 3 mL 3 mL Intravenous Given     03/09/2021 1426 ceftriaxone (ROCEPHIN) 1 g/50 mL in dextrose IVPB 1,000 mg Intravenous New Bag     03/09/2021 1415 fluconazole (DIFLUCAN) tablet 200 mg 200 mg Oral Given     03/09/2021 1417 sodium chloride 0 9 % bolus 1,000 mL 1,000 mL Intravenous New Bag     03/09/2021 1416 acetaminophen (TYLENOL) tablet 975 mg 975 mg Oral Given        Past Medical History:   Diagnosis Date    Acute pancreatitis without infection or necrosis 9/26/2019    Age-related nuclear cataract, right 11/5/2020    Anemia of chronic renal failure     BPH (benign prostatic hyperplasia)     CKD (chronic kidney disease) stage 5, GFR less than 15 ml/min (HCC)     Coronary artery disease     COVID-19     DJD (degenerative joint disease)     Essential hypertension     Gait disturbance     Generalized weakness     GERD (gastroesophageal reflux disease)     Gout     History of transfusion     Hydronephrosis     Hyperlipidemia     Hypertension     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)     Pressure injury of skin     Proteinuria     Sepsis ([address] ) 3/11/2020    Thrombophlebitis migrans     Type 2 diabetes mellitus      Present on Admission:   UTI (urinary tract infection) due to urinary indwelling catheter (HCC)   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)   Clostridium difficile diarrhea   Essential hypertension   CKD (chronic kidney disease) stage 5, GFR less than 15 ml/min (HCC)   Anemia      Admitting Diagnosis: Carcinoid syndrome (HCC) [E34 0]  Diarrhea [R19 7]  Dehydration [E86 0]  UTI (urinary tract infection) [N39 0]  Liver cancer ([facility] [address] ) [C22 9]  Chronic renal failure [N18 9]  Ascites [R18 8]  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) [C7A 8, C7B 8]  Chronic bilateral pleural effusions [J90]  Age/Sex: 79 y o  male  Admission Orders:  Scheduled Medications:  aspirin, 81 mg, Oral, Daily  cefTRIAXone, 1,000 mg, Intravenous, Q24H  fluconazole, 200 mg, Oral, Daily  heparin (porcine), 5,000 Units, Subcutaneous, Q8H [facility]  magnesium oxide, 200 mg, Oral, BID  metoprolol tartrate, 12 5 mg, Oral, Q12H NATACHA  NIFEdipine ER, 60 mg, Oral, Daily  sodium bicarbonate, 325 mg, Oral, BID  sodium chloride (PF), 3 mL, Intravenous, Q12H NATACHA  vancomycin, 125 mg, Oral, Q6H [facility]      Continuous IV Infusions:  sodium chloride, 125 mL/hr, Intravenous, Continuous      PRN Meds:  acetaminophen, 650 mg, Oral, Q6H PRN  ondansetron, 4 mg, Intravenous, Q6H PRN    Tele       IP CONSULT TO GASTROENTEROLOGY  IP CONSULT TO INFECTIOUS DISEASES  IP CONSULT TO CASE MANAGEMENT    Network Utilization Review Department  ATTENTION: Please call with any questions or concerns to [phone] and carefully listen to the prompts so that you are directed to the right person  All voicemails are confidential   Felix Euceda all requests for admission clinical reviews, approved or denied determinations and any other requests to dedicated fax number below belonging to the campus where the patient is receiving treatment   List of dedicated fax numbers for the Facilities:  FACILITY NAME UR FAX NUMBER   ADMISSION DENIALS (Administrative/Medical Necessity) [phone]   [address] (Maternity/NICU/Pediatrics) 261 [facility],[address] Floor [facility] 40 125 [facility]  [phone]   Marian Squires [address] (  Carole Heller "[address]) [address] [address] [phone]   [place] [address] [phone]

## 2021-03-10 NOTE — UTILIZATION REVIEW
Notification of Inpatient Admission/Inpatient Authorization Request   This is a Notification of Inpatient Admission for [address]  Be advised that this patient was admitted to our facility under Inpatient Status  Contact Alena Barrientos at [phone] for additional admission information  [address] [facility] [place] DEPT DEDICATED [place] Cody [phone]  Patient Name:   Saranya Jose   YOB: 1940       [address]   [address]:   [address]   Tax ID: [id number]  NPI: [id number] Attending Provider/NPI:  Phone:  Address[facility] Rin Nguyen [facility]  [phone]  Same as Facility   Place of Service Code: 24     [facility]   Start Date: 3/9/21 1428 Discharge Date & Time: No discharge date for patient encounter  Type of Admission: Inpatient Status Discharge Disposition   (if discharged): Home with [address] [employer]   Patient Diagnoses: Carcinoid syndrome ([facility] [address] ) [E34 0]  Diarrhea [R19 7]  Dehydration [E86 0]  UTI (urinary tract infection) [N39 0]  Liver cancer ([address] ) [C22 9]  Chronic renal failure [N18 9]  Ascites [R18 8]  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 ([address] ) [C7A 8, C7B 8]  Chronic bilateral pleural effusions [J90]     Orders: Admission Orders (From admission, onward)     Ordered        03/09/21 1428  Inpatient Admission  Once                    Assigned Utilization Review Contact: Alena Barrientos  Utilization   Network Utilization Review Department  Phone: [phone]; Fax [phone]  Email: [email or web address]   ATTENTION PAYERS: Please call the assigned Utilization  directly with any questions or concerns ALL voicemails in the department are confidential  Send all requests for admission clinical reviews, approved or denied determinations and any other requests to dedicated fax number belonging to the campus where the patient is receiving treatment

## 2021-03-10 NOTE — CONSULTS
Consultation - Infectious Disease   Mike Leon 79 y o  male MRN: [id number]  Unit/Bed#: -01 Encounter: [id number]      IMPRESSION & RECOMMENDATIONS:   Impression/Recommendations:  1  Chronic diarrhea  Suspect all due to progressive of #3  Consider C  Diff given recent healthcare and antibiotics  Doubt other acute bacterial etiology  Clinically stable without sepsis  Rec:  · Continue PO vancomycin for now  · Follow up C  Diff  If negative, D/C PO vancomycin  · GI follow-up ongoing  · Follow temperatures closely  · Check CBC in AM  · Supportive care as per the primary service    2  Asymptomatic bacteruria/candiduria  In setting of chronic Haile  Suspect chronic colonization versus contaminant  No signs or symptoms to suggest acute infection  Rec:  · D/C antibiotics, antifungals  · Monitor urine output closely    3  Metastatic neuroendocrine carcinoma  Metastases to liver  With carcinoid syndrome on octreotide, Imodium  Rec:  · Outpatient Heme-Onc follow-up ongoing    4  BPH with retention  Chronic Haile  5   CKD  Baseline Cr 4 8-5 0  Rec:  · Check BMP in AM    6  DM  The above impression and plan was discussed in detail with RL Woodruff with GI  Antibiotics:  Ceftriaxone #2  Fluconazole #2  PO vancomycin #2    Thank you for this consultation  We will follow along with you  HISTORY OF PRESENT ILLNESS:  Reason for Consult: UTI    HPI: Mike Leon is a 79 y o  male with 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 and chronic diarrhea  He is on octreotide and Imodium at home  Last Heme-Onc note notes overall clinical decline  He had a recent admission in late January for mild COVID and early February for weakness  At that time he was treated for a UTI in the setting of a chronic Haile  He presented to the ED on 3/9 from home with worsening diarrhea, hypotension, and weakness  Upon presentation he was noted to be afebrile with a normal HR and WBC  A UA showed pyuria and yeast   A CT A/P showed progression of liver metastases  He was started on ceftriaxone and fluconazole  We are asked to comment in further evaluation and management of possible UTI  In performing this consult, I have reviewed prior admission and outpatient visit records in detail  REVIEW OF SYSTEMS:  Denies pain or mechanical issues with Haile  No recent exchange  Having BMs every our and is incontinent  A complete system-based review of systems is otherwise negative  PAST MEDICAL HISTORY:  Past Medical History:   Diagnosis Date    Acute pancreatitis without infection or necrosis 9/26/2019    Age-related nuclear cataract, right 11/5/2020    Anemia of chronic renal failure     BPH (benign prostatic hyperplasia)     CKD (chronic kidney disease) stage 5, GFR less than 15 ml/min (HCC)     Coronary artery disease     COVID-19     DJD (degenerative joint disease)     Essential hypertension     Gait disturbance     Generalized weakness     GERD (gastroesophageal reflux disease)     Gout     History of transfusion     Hydronephrosis     Hyperlipidemia     Hypertension     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)     Pressure injury of skin     Proteinuria     Sepsis ([address] ) 3/11/2020    Thrombophlebitis migrans     Type 2 diabetes mellitus      Past Surgical History:   Procedure Laterality Date    CATARACT EXTRACTION Right 11/05/2020    CATARACT EXTRACTION W/ INTRAOCULAR LENS IMPLANT Right 11/5/2020    Procedure: EYE OD CATARACT EXTRACTION WITH IOL INSERTION;  Surgeon: Mirella Spear MD;  Location: [address] [place] [address] OR;  Service: Ophthalmology    CHOLECYSTECTOMY     [address] [place] [address] N/A 2/7/2020    Procedure: CHOLECYSTECTOMY LAPAROSCOPIC;  Surgeon:  Finn Stevenson MD;  Location: [address] [place] [address] OR;  Service: General    CORONARY ANGIOPLASTY WITH STENT PLACEMENT      FL RETROGRADE PYELOGRAM  5/21/2020    IR BIOPSY LIVER MASS  1/24/2019    IR CHOLECYSTOSTOMY TUBE PLACEMENT  2019    IR NON-TUNNELED CENTRAL LINE PLACEMENT  2021    DC CYSTO/URETERO W/LITHOTRIPSY &INDWELL STENT INSRT Left 2020    Procedure: CYSTOSCOPY URETEROSCOPY WITH LITHOTRIPSY HOLMIUM LASER, RETROGRADE PYELOGRAM AND INSERTION STENT URETERAL;  Surgeon: Rebecca Hassan MD;  Location: [facility];  Service: Urology    DC CYSTOURETHROSCOPY,URETER CATHETER Left 2020    Procedure: CYSTOSCOPY WITH INSERTION STENT URETERAL;  Surgeon: Torey Alfaro MD;  Location: [facility];  Service: Urology       FAMILY HISTORY:  Non-contributory    SOCIAL HISTORY:  Social History     Substance and Sexual Activity   Alcohol Use Never    Alcohol/week: 0 0 standard drinks    Frequency: Never     Social History     Substance and Sexual Activity   Drug Use Never     Social History     Tobacco Use   Smoking Status Never Smoker   Smokeless Tobacco Never Used       ALLERGIES:  No Known Allergies    MEDICATIONS:  All current active medications have been reviewed  PHYSICAL EXAM:  Vitals:  Temp:  [97 5 °F (36 4 °C)-97 8 °F (36 6 °C)] 97 8 °F (36 6 °C)  HR:  [68-75] 71  Resp:  [17-19] 19  BP: (117-178)/(66-94) 140/66  SpO2:  [95 %-98 %] 96 %  Temp (24hrs), Av 7 °F (36 5 °C), Min:97 5 °F (36 4 °C), Max:97 8 °F (36 6 °C)  Current: Temperature: 97 8 °F (36 6 °C)     Physical Exam:  General:  Weak, fatigued elderly male, in no acute distress  Eyes:  Conjunctive clear with no hemorrhages or effusions  Oropharynx:  No ulcers, no lesions  Neck:  Supple, no lymphadenopathy  Lungs:  Normal respiratory excursion, no accessory muscle use  Cardiac:  Regular rate and rhythm, no murmurs  Abdomen:  Soft, non-tender, non-distended  Extremities:  No peripheral cyanosis, clubbing, or edema  Skin:  No rashes, no ulcers  Neurological:  Moves all four extremities spontaneously, sensation grossly intact    LABS, IMAGING, & OTHER STUDIES:  Lab Results:  I have personally reviewed pertinent labs    Results from last 7 days   Lab Units 03/10/21  0433 03/09/21  1235   POTASSIUM mmol/L 3 6 3 6   CHLORIDE mmol/L 105 97*   CO2 mmol/L 25 23   BUN mg/dL 47* 48*   CREATININE mg/dL 4 88* 5 00*   EGFR ml/min/1 73sq m 10 10   CALCIUM mg/dL 8 2* 8 8   AST U/L 17 26   ALT U/L 19 27   ALK PHOS U/L 154* 197*     Results from last 7 days   Lab Units 03/10/21  0433 03/09/21  1235   WBC Thousand/uL 8 15 12 28*   HEMOGLOBIN g/dL 9 4* 11 5*   PLATELETS Thousands/uL 142* 213     Results from last 7 days   Lab Units 03/09/21  1301 03/09/21  1249   BLOOD CULTURE  Received in Microbiology Lab  Culture in Progress  Received in Microbiology Lab  Culture in Progress  Imaging Studies:   I have personally reviewed pertinent imaging study reports and images in PACS  CT A/P reviewed personally progression of hepatic metastases, possible sigmoid colon wall thickening versus artifact    EKG, Pathology, and Other Studies:   I have personally reviewed pertinent reports

## 2021-03-11 NOTE — QUICK NOTE
Called patient's significant other, Colleen Bernal  Call went to a voicemail, left detailed voice message with a call back number

## 2021-03-11 NOTE — PLAN OF CARE
Problem: Potential for Falls  Goal: Patient will remain free of falls  Description: INTERVENTIONS:  - Assess patient frequently for physical needs  -  Identify cognitive and physical deficits and behaviors that affect risk of falls    -  [place] fall precautions as indicated by assessment   - Educate patient/family on patient safety including physical limitations  - Instruct patient to call for assistance with activity based on assessment  - Modify environment to reduce risk of injury  - Consider OT/PT consult to assist with strengthening/mobility  3/11/2021 1132 by Marla Frankel RN  Outcome: Adequate for Discharge  3/11/2021 1110 by Marla Frankel RN  Outcome: Progressing     Problem: Prexisting or High Potential for Compromised Skin Integrity  Goal: Skin integrity is maintained or improved  Description: INTERVENTIONS:  - Identify patients at risk for skin breakdown  - Assess and monitor skin integrity  - Assess and monitor nutrition and hydration status  - Monitor labs   - Assess for incontinence   - Turn and reposition patient  - Assist with mobility/ambulation  - Relieve pressure over bony prominences  - Avoid friction and shearing  - Provide appropriate hygiene as needed including keeping skin clean and dry  - Evaluate need for skin moisturizer/barrier cream  - Collaborate with interdisciplinary team   - Patient/family teaching  - Consider wound care consult   3/11/2021 1132 by Marla Frankel RN  Outcome: Adequate for Discharge  3/11/2021 1110 by Marla Frankel RN  Outcome: Progressing     Problem: PAIN - ADULT  Goal: Verbalizes/displays adequate comfort level or baseline comfort level  Description: Interventions:  - Encourage patient to monitor pain and request assistance  - Assess pain using appropriate pain scale  - Administer analgesics based on type and severity of pain and evaluate response  - Implement non-pharmacological measures as appropriate and evaluate response  - Consider cultural and social influences on pain and pain management  - Notify physician/advanced practitioner if interventions unsuccessful or patient reports new pain  3/11/2021 1132 by Onofre Jacobs RN  Outcome: Adequate for Discharge  3/11/2021 1110 by Onofre Jacobs RN  Outcome: Progressing     Problem: INFECTION - ADULT  Goal: Absence or prevention of progression during hospitalization  Description: INTERVENTIONS:  - Assess and monitor for signs and symptoms of infection  - Monitor lab/diagnostic results  - Monitor all insertion sites, i e  indwelling lines, tubes, and drains  - Monitor endotracheal if appropriate and nasal secretions for changes in amount and color  - [place] appropriate cooling/warming therapies per order  - Administer medications as ordered  - Instruct and encourage patient and family to use good hand hygiene technique  - Identify and instruct in appropriate isolation precautions for identified infection/condition  3/11/2021 1132 by Onofre Jacobs RN  Outcome: Adequate for Discharge  3/11/2021 1110 by Onofre Jacobs RN  Outcome: Progressing  Goal: Absence of fever/infection during neutropenic period  Description: INTERVENTIONS:  - Monitor WBC    3/11/2021 1132 by Onofre Jacobs RN  Outcome: Adequate for Discharge  3/11/2021 1110 by Onofre Jacobs RN  Outcome: Progressing     Problem: SAFETY ADULT  Goal: Patient will remain free of falls  Description: INTERVENTIONS:  - Assess patient frequently for physical needs  -  Identify cognitive and physical deficits and behaviors that affect risk of falls    -  [place] fall precautions as indicated by assessment   - Educate patient/family on patient safety including physical limitations  - Instruct patient to call for assistance with activity based on assessment  - Modify environment to reduce risk of injury  - Consider OT/PT consult to assist with strengthening/mobility  3/11/2021 1132 by Krystina Diaz RN  Outcome: Adequate for Discharge  3/11/2021 1110 by Krystina Diaz RN  Outcome: Progressing  Goal: Maintain or return to baseline ADL function  Description: INTERVENTIONS:  -  Assess patient's ability to carry out ADLs; assess patient's baseline for ADL function and identify physical deficits which impact ability to perform ADLs (bathing, care of mouth/teeth, toileting, grooming, dressing, etc )  - Assess/evaluate cause of self-care deficits   - Assess range of motion  - Assess patient's mobility; develop plan if impaired  - Assess patient's need for assistive devices and provide as appropriate  - Encourage maximum independence but intervene and supervise when necessary  - Involve family in performance of ADLs  - Assess for home care needs following discharge   - Consider OT consult to assist with ADL evaluation and planning for discharge  - Provide patient education as appropriate  3/11/2021 1132 by Krystina Diaz RN  Outcome: Adequate for Discharge  3/11/2021 1110 by Krystina Diaz RN  Outcome: Progressing  Goal: Maintain or return mobility status to optimal level  Description: INTERVENTIONS:  - Assess patient's baseline mobility status (ambulation, transfers, stairs, etc )    - Identify cognitive and physical deficits and behaviors that affect mobility  - Identify mobility aids required to assist with transfers and/or ambulation (gait belt, sit-to-stand, lift, walker, cane, etc )  - [place] fall precautions as indicated by assessment  - Record patient progress and toleration of activity level on Mobility SBAR; progress patient to next Phase/Stage  - Instruct patient to call for assistance with activity based on assessment  - Consider rehabilitation consult to assist with strengthening/weightbearing, etc   3/11/2021 1132 by Krystina Diaz RN  Outcome: Adequate for Discharge  3/11/2021 1110 by Krystina Diaz RN  Outcome: Progressing Problem: DISCHARGE PLANNING  Goal: Discharge to home or other facility with appropriate resources  Description: INTERVENTIONS:  - Identify barriers to discharge w/patient and caregiver  - Arrange for needed discharge resources and transportation as appropriate  - Identify discharge learning needs (meds, wound care, etc )  - Arrange for interpretive services to assist at discharge as needed  - Refer to Case Management Department for coordinating discharge planning if the patient needs post-hospital services based on physician/advanced practitioner order or complex needs related to functional status, cognitive ability, or social support system  3/11/2021 1132 by Taylor Doty RN  Outcome: Adequate for Discharge  3/11/2021 1110 by Taylor Doty RN  Outcome: Progressing     Problem: Knowledge Deficit  Goal: Patient/family/caregiver demonstrates understanding of disease process, treatment plan, medications, and discharge instructions  Description: Complete learning assessment and assess knowledge base    Interventions:  - Provide teaching at level of understanding  - Provide teaching via preferred learning methods  3/11/2021 1132 by Taylor Doty RN  Outcome: Adequate for Discharge  3/11/2021 1110 by Taylor Doty RN  Outcome: Progressing

## 2021-03-11 NOTE — ASSESSMENT & PLAN NOTE
Patient has history of C diff 2 times in the past   He was treated with oral vancomycin  Now he is presenting for worsening diarrhea for past 3 days  Etiology of diarrhea can be carcinoid tumor  Patient mentioned he has diarrhea off and on for 2 months  Patient denied any abdominal pain or fever  Mild leukocytosis at presentation, trending down  C diff toxin-negative   CT abdomen is negative for acute obstruction  Id recommendations appreciated   Vancomycin discontinued  No intervention from GI at this point

## 2021-03-11 NOTE — ASSESSMENT & PLAN NOTE
Patient had elevated blood pressure at home      Continue Lopressor 12 5 mg q 12 at home, continue to hold, continue nifedipine ER 60 mg once daily   Blood pressure monitoring

## 2021-03-11 NOTE — CASE MANAGEMENT
Cm spoke with the pt and he states that Betsey Smith will be picking him up at 12 noon  CM added a referral for a SW from [facility]     Cm referred the pt for the TCM appt    CM requested an OP CM for the pt

## 2021-03-11 NOTE — ASSESSMENT & PLAN NOTE
Lab Results   Component Value Date    EGFR 11 03/11/2021    EGFR 10 03/10/2021    EGFR 10 03/09/2021    CREATININE 4 73 (H) 03/11/2021    CREATININE 4 88 (H) 03/10/2021    CREATININE 5 00 (H) 03/09/2021     Baseline creatinine 4 5-5 3      Not on hemodialysis  Continue to monitor BMP on outpatient with PCP/nephrology

## 2021-03-11 NOTE — ASSESSMENT & PLAN NOTE
Patient has chronic Haile catheter in place  History of recurrent UTI in the past   Treated with IV meropenem in past   Previous history of ESBL E coli, Klebsiella, E faecalis  Urine cultures were  sensitive to ceftriaxone        Antibiotics discontinued as urine culture shows contaminated flor

## 2021-03-11 NOTE — DISCHARGE SUMMARY
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  CT abdomen showed the progression of hepatic lesions  Small abdominal ascites, no obstruction  Refer to full report for further details  GI on board-no intervention from their end at this point  Case discussed with Dr Valeria Larsen, outpatient follow-up with him next week    * Suspected C diff related diarrhea  Assessment & Plan  Patient has history of C diff 2 times in the past   He was treated with oral vancomycin  Now he is presenting for worsening diarrhea for past 3 days  Etiology of diarrhea can be carcinoid tumor  Patient mentioned he has diarrhea off and on for 2 months  Patient denied any abdominal pain or fever  Mild leukocytosis at presentation, trending down  C diff toxin-negative   CT abdomen is negative for acute obstruction  Id recommendations appreciated   Vancomycin discontinued  No intervention from GI at this point  Pleural effusion  Assessment & Plan  Bilateral pleural effusions (right larger than left)  Stable as prior study  Stable on room air    Anemia  Assessment & Plan  Chronic multifactorial anemia  Hemoglobin stable at 8 7      UTI (urinary tract infection) due to chronic indwelling catheter [facility])  Assessment & Plan  Patient has chronic Haile catheter in place  History of recurrent UTI in the past   Treated with IV meropenem in past   Previous history of ESBL E coli, Klebsiella, E faecalis  Urine cultures were  sensitive to ceftriaxone  Antibiotics discontinued as urine culture shows contaminated flor      A-fib [facility])  Assessment & Plan  Patient has history of AFib      Currently rate controlled with metoprolol, not on any anticoagulation due to risk of falls/advanced age    CKD (chronic kidney disease) stage 5, GFR less than 15 ml/min [facility])  Assessment & Plan  Lab Results   Component Value Date    EGFR 11 03/11/2021    EGFR 10 03/10/2021    EGFR 10 03/09/2021    CREATININE 4 73 (H) 03/11/2021    CREATININE 4 88 (H) 03/10/2021    CREATININE 5 00 (H) 03/09/2021     Baseline creatinine 4 5-5 3  Not on hemodialysis  Continue to monitor BMP on outpatient with PCP/nephrology     Essential hypertension  Assessment & Plan  Patient had elevated blood pressure at home    Continue Lopressor 12 5 mg q 12 at home, continue to hold, continue nifedipine ER 60 mg once daily   Blood pressure monitoring               Resolved Problems  Date Reviewed: 3/11/2021    None          Admission Date:   Admission Orders (From admission, onward)     Ordered        03/09/21 1428  Inpatient Admission  Once                     Admitting Diagnosis: Carcinoid syndrome ([facility] [address] ) [E34 0]  Diarrhea [R19 7]  Dehydration [E86 0]  UTI (urinary tract infection) [N39 0]  Liver cancer ([facility] [address] ) [C22 9]  Chronic renal failure [N18 9]  Ascites [R18 8]  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 ([facility] [address] ) [C7A 8, C7B 8]  Chronic bilateral pleural effusions [J90]    HPI:  As per John Ramirez MD , on 3/9/21 Ivy Robles is a 79 y o  male with past medical history of hypertension, CAD, diabetes mellitus, anemia, CKD stage 5, not on hemodialysis, AFib, not on anticoagulation, history of recurrent UTI secondary to chronic urinary catheter, neuroendocrine tumor/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, history of C diff presented to emergency department with complaint of worsening diarrhea  Patient has diarrhea going on for 2 months, today he is sent by home health nurse, diarrhea got worsened 2 days ago, he is having 10-12 bowel movements every day, watery in consistency  Denied any abdominal pain, no nausea or vomiting  Denied any fever    Patient denied headache, shortness of breath, chest pain, urinary complaints  Patient has chronic Haile catheter and history of frequent UTI with culture positive for ESBL E coli , E faecalis, Klebsiella  Two episodes of C diff infection treated with p o  Vancomycin  In ED vitals were stable  Urinalysis positive for infection, lactic acid 2, creatinine 5 baseline is around 4 5-5 3   BUN is elevated indicating that patient is dehydrated  Bite count 12 28, anion gap 20  Hemoglobin 11 5 which is on baseline  Urine is also growing E cell  No ketones in the urine are noted  CT abdomen show progression of hepatic lesions with carcinoid tumor, bilateral effusion right greater than left small abdominal ascites with no small-bowel obstruction  In ED patient received 1 dose of IV ceftriaxone and fluconazole  Off note patient was admitted in January 2021 for pneumonia secondary to COVID-19 infection  He was again admitted in February 2021 for dehydration and Julian    Procedures Performed: No orders of the defined types were placed in this encounter  Summary of Hospital Course:  Patient was followed by Gastroenterology and Infectious Disease physicians  All infectious workup including C diff, blood cultures, urine cultures, procalcitonin were found to be unremarkable  Patient's antibiotics were discontinued  During the hospital stay patient had 6 bowel movements ranging from soft to loose  Patient and family refused short-term rehabilitation, and preferred home with home health services  During the hospital stay patient remained hemodynamically stable, patient was initiated on imodium as per Hematology/Oncology recommendation  Patient will follow-up with his heme Onc in coming week      Significant Findings, Care, Treatment and Services Provided:   Gastroenterology  Infectious  disease    Complications: none    Condition at Discharge: stable         Discharge instructions/Information to patient and family:   See after visit summary for information provided to patient and family  Provisions for Follow-Up Care:  See after visit summary for information related to follow-up care and any pertinent home health orders  PCP: RIKA Monroe    Disposition: home with A     Planned Readmission: No    Discharge Statement   I spent 35 minutes discharging the patient  This time was spent on the day of discharge  I had direct contact with the patient on the day of discharge  Additional documentation is required if more than 30 minutes were spent on discharge  Discharge Medications:  See after visit summary for reconciled discharge medications provided to patient and family

## 2021-03-11 NOTE — PROGRESS NOTES
Progress Note - Ivy Robles 79 y o  male MRN: [id number]    Unit/Bed#: -01 Encounter: [id number]        Subjective:     Patient had 3 documented bowel movements yesterday  He is unsure whether not he woke up in middle the night to have another bowel movement  ROS: As noted in the HPI, otherwise all others negative  Objective:     Vitals: Blood pressure 138/64, pulse 67, temperature 97 9 °F (36 6 °C), resp  rate 20, height 5' 8" (1 727 m), weight 72 9 kg (160 lb 11 5 oz), SpO2 97 %  ,Body mass index is 24 44 kg/m²  Intake/Output Summary (Last 24 hours) at 3/11/2021 1045  Last data filed at 3/11/2021 0221  Gross per 24 hour   Intake 1920 ml   Output 1625 ml   Net 295 ml       Physical Exam:     General Appearance: Alert and oriented x 3  In no respiratory distress  Lungs: Clear to auscultation bilaterally, no rales or rhonchi  Heart: Regular rate and rhythm, S1, S2 normal, no murmur, click, rub or gallop  Abdomen: Soft, non-tender, non-distended; bowel sounds normal; no masses or no organomegaly  Extremities: No cyanosis, edema    Invasive Devices     Peripheral Intravenous Line            Peripheral IV 03/09/21 Left Antecubital 1 day          Drain            Urethral Catheter Double-lumen 16 Fr  1 day                Lab Results:  Results from last 7 days   Lab Units 03/11/21  0610   WBC Thousand/uL 8 42   HEMOGLOBIN g/dL 8 7*   HEMATOCRIT % 26 4*   PLATELETS Thousands/uL 147*   NEUTROS PCT % 70   LYMPHS PCT % 17   MONOS PCT % 9   EOS PCT % 3     Results from last 7 days   Lab Units 03/11/21  0610 03/10/21  0433   POTASSIUM mmol/L 3 7 3 6   CHLORIDE mmol/L 105 105   CO2 mmol/L 22 25   BUN mg/dL 43* 47*   CREATININE mg/dL 4 73* 4 88*   CALCIUM mg/dL 7 9* 8 2*   ALK PHOS U/L  --  154*   ALT U/L  --  19   AST U/L  --  17     Results from last 7 days   Lab Units 03/09/21  1235   INR  0 95           Imaging Studies: I have personally reviewed pertinent imaging studies      Ct Abdomen Pelvis Wo Contrast    Result Date: 3/9/2021  Impression: 1  Apparent progression of numerous hepatic lesions likely secondary to metastatic disease in the setting of carcinoid syndrome  2  Stable, partially calcified dominant mesenteric mass presumably carcinoid tumor  3  Stable omental nodularity in keeping with peritoneal carcinomatosis  4  No evidence of bowel obstruction  Questionable thickening of the sigmoid colon which may be artifactual due to noncontrast imaging  No pericolonic inflammatory changes  5  Small amount of abdominal ascites and bilateral pleural effusions (right larger than left), similar  Limited study without contrast  Workstation performed: [id number]     Xr Chest Portable    Result Date: 3/9/2021  Impression: Findings suspicious for evolving right lower lobe infiltrate  The study was marked in [facility] for immediate notification  Workstation performed: [id number]         Assessment and Plan:     1) Acute on chronic diarrhea in the setting of neuroendocrine tumor - Patient is on lanreotide monthly for treatment of carcinoid syndrome  His creatinine seems to be at baseline, no hypokalemia  His C diff was negative  It appears that his diarrhea is lessening in frequency  - Would recommend consulting Oncology after discussion with my attending  - Would recommend antidiarrheal such as Lomotil or Imodium  - GI will sign off, please call with questions  Thank you

## 2021-03-11 NOTE — DISCHARGE INSTRUCTIONS
Nutrition Tips for Relief of Diarrhea   WHAT YOU NEED TO KNOW:   There are diet changes you can make to help relieve or stop diarrhea  These changes include limiting or avoiding foods and liquids that are high in sugar, fat, fiber, and lactose  Lactose is a sugar found in milk products  Milk products can cause diarrhea in people who are lactose intolerant  You should also drink extra liquids to replace fluids that are lost when you have diarrhea  Diarrhea can lead to dehydration  DISCHARGE INSTRUCTIONS:   Foods to limit or avoid:   · Dairy:      ? Whole milk    ? Half-and-half, cream, and sour cream    ? Regular (whole milk) ice cream    · Grains:      ? Whole wheat and whole grain breads, pasta, cereals, and crackers    ? [place] and wild rice    ? Breads and cereals with seeds or nuts    ? Popcorn    · Fruit and vegetables:      ? All raw fruits, except bananas and melon    ? Dried fruits, including prunes and raisins    ? Canned fruit in heavy syrup    ? Prune juice and any fruit juice with pulp    ? Raw vegetables, except lettuce     ? Fried vegetables    ? Corn, raw and cooked broccoli, cabbage, cauliflower, and cuong greens    · Protein:      ? Fried meat, poultry, and fish    ? High-fat luncheon meats, such as bologna    ? Fatty meats, such as sausage, bliss, and hot dogs    ? Beans and nuts    · Liquids:      ? Sodas and fruit-flavored drinks    ? Drinks that contain caffeine, such as energy drinks, coffee, and tea     ? Drinks that contain alcohol or sugar alcohol, such as sorbitol    Foods and liquids you may eat or drink:  Most people can tolerate the foods and liquids listed below  If any of them make your symptoms worse, stop eating or drinking them until you feel better  If you are lactose intolerant, avoid milk products  · Dairy:      ? Skim or low-fat milk or evaporated milk    ? Soy milk or buttermilk     ? Low-fat, part-skim, and aged cheese    ?  Yogurt, low-fat ice cream, or sherbert    · Grains:  (Choose foods with less than 2 grams of dietary fiber per serving )     ? White or refined flour breads, bagels, pasta, and crackers    ? Cold or hot cereals made from white or refined flour such as puffed rice, cornflakes, or cream of wheat    ? White rice    · Fruit and vegetables:      ? Bananas or melon    ? Fruit juice without pulp, except prune juice    ? Canned fruit in juice or light syrup    ? Lettuce and most well-cooked vegetables without seeds or skins     ? Strained vegetable juice    · Protein:      ? Tender, well-cooked meat, poultry, or fish    ? Well-cooked eggs or soy foods (cooked without added fat)    ? Smooth nut butters    · Fats:  (Limit fats to less than 8 teaspoons a day)     ? Oil, butter, or margarine, or mayonnaise    ? Cream cheese or salad dressings    · Liquids:      ? For infants, breast milk or formula    ? Oral rehydration solution     ? Decaffeinated coffee or caffeine-free teas    ? Soft drinks without caffeine    Other guidelines to follow:   · Drink liquids as directed  You may need to drink more liquids than usual to prevent dehydration  Ask how much liquid to drink each day and which liquids are best for you  You may need to drink an oral rehydration solution (ORS)  An ORS helps replace fluids and electrolytes that you lose when you have diarrhea  · Eat small meals or snacks every 3 to 4 hours  instead of large meals  Continue eating even if you still have diarrhea  Your diarrhea will continue for a few days but should gradually go away  © Copyright [place] Information is for End User's use only and may not be sold, redistributed or otherwise used for commercial purposes  All illustrations and images included in CareNotes® are the copyrighted property of [employer]  or [facility] Suraj Alvarez   The above information is an  only  It is not intended as medical advice for individual conditions or treatments   Talk to your doctor, nurse or pharmacist before following any medical regimen to see if it is safe and effective for you

## 2021-03-11 NOTE — CASE MANAGEMENT
Cm was informed the pt is medically stable to dc  Cm called the pt S/O and left a vm for a return call

## 2021-03-11 NOTE — PLAN OF CARE
Problem: Potential for Falls  Goal: Patient will remain free of falls  Description: INTERVENTIONS:  - Assess patient frequently for physical needs  -  Identify cognitive and physical deficits and behaviors that affect risk of falls    -  [place] fall precautions as indicated by assessment   - Educate patient/family on patient safety including physical limitations  - Instruct patient to call for assistance with activity based on assessment  - Modify environment to reduce risk of injury  - Consider OT/PT consult to assist with strengthening/mobility  Outcome: Progressing     Problem: Prexisting or High Potential for Compromised Skin Integrity  Goal: Skin integrity is maintained or improved  Description: INTERVENTIONS:  - Identify patients at risk for skin breakdown  - Assess and monitor skin integrity  - Assess and monitor nutrition and hydration status  - Monitor labs   - Assess for incontinence   - Turn and reposition patient  - Assist with mobility/ambulation  - Relieve pressure over bony prominences  - Avoid friction and shearing  - Provide appropriate hygiene as needed including keeping skin clean and dry  - Evaluate need for skin moisturizer/barrier cream  - Collaborate with interdisciplinary team   - Patient/family teaching  - Consider wound care consult   Outcome: Progressing     Problem: PAIN - ADULT  Goal: Verbalizes/displays adequate comfort level or baseline comfort level  Description: Interventions:  - Encourage patient to monitor pain and request assistance  - Assess pain using appropriate pain scale  - Administer analgesics based on type and severity of pain and evaluate response  - Implement non-pharmacological measures as appropriate and evaluate response  - Consider cultural and social influences on pain and pain management  - Notify physician/advanced practitioner if interventions unsuccessful or patient reports new pain  Outcome: Progressing     Problem: INFECTION - ADULT  Goal: Absence or prevention of progression during hospitalization  Description: INTERVENTIONS:  - Assess and monitor for signs and symptoms of infection  - Monitor lab/diagnostic results  - Monitor all insertion sites, i e  indwelling lines, tubes, and drains  - Monitor endotracheal if appropriate and nasal secretions for changes in amount and color  - [place] appropriate cooling/warming therapies per order  - Administer medications as ordered  - Instruct and encourage patient and family to use good hand hygiene technique  - Identify and instruct in appropriate isolation precautions for identified infection/condition  Outcome: Progressing  Goal: Absence of fever/infection during neutropenic period  Description: INTERVENTIONS:  - Monitor WBC    Outcome: Progressing     Problem: SAFETY ADULT  Goal: Patient will remain free of falls  Description: INTERVENTIONS:  - Assess patient frequently for physical needs  -  Identify cognitive and physical deficits and behaviors that affect risk of falls    -  [place] fall precautions as indicated by assessment   - Educate patient/family on patient safety including physical limitations  - Instruct patient to call for assistance with activity based on assessment  - Modify environment to reduce risk of injury  - Consider OT/PT consult to assist with strengthening/mobility  Outcome: Progressing  Goal: Maintain or return to baseline ADL function  Description: INTERVENTIONS:  -  Assess patient's ability to carry out ADLs; assess patient's baseline for ADL function and identify physical deficits which impact ability to perform ADLs (bathing, care of mouth/teeth, toileting, grooming, dressing, etc )  - Assess/evaluate cause of self-care deficits   - Assess range of motion  - Assess patient's mobility; develop plan if impaired  - Assess patient's need for assistive devices and provide as appropriate  - Encourage maximum independence but intervene and supervise when necessary  - Involve family in performance of ADLs  - Assess for home care needs following discharge   - Consider OT consult to assist with ADL evaluation and planning for discharge  - Provide patient education as appropriate  Outcome: Progressing  Goal: Maintain or return mobility status to optimal level  Description: INTERVENTIONS:  - Assess patient's baseline mobility status (ambulation, transfers, stairs, etc )    - Identify cognitive and physical deficits and behaviors that affect mobility  - Identify mobility aids required to assist with transfers and/or ambulation (gait belt, sit-to-stand, lift, walker, cane, etc )  - [place] fall precautions as indicated by assessment  - Record patient progress and toleration of activity level on Mobility SBAR; progress patient to next Phase/Stage  - Instruct patient to call for assistance with activity based on assessment  - Consider rehabilitation consult to assist with strengthening/weightbearing, etc   Outcome: Progressing     Problem: DISCHARGE PLANNING  Goal: Discharge to home or other facility with appropriate resources  Description: INTERVENTIONS:  - Identify barriers to discharge w/patient and caregiver  - Arrange for needed discharge resources and transportation as appropriate  - Identify discharge learning needs (meds, wound care, etc )  - Arrange for interpretive services to assist at discharge as needed  - Refer to Case Management Department for coordinating discharge planning if the patient needs post-hospital services based on physician/advanced practitioner order or complex needs related to functional status, cognitive ability, or social support system  Outcome: Progressing     Problem: Knowledge Deficit  Goal: Patient/family/caregiver demonstrates understanding of disease process, treatment plan, medications, and discharge instructions  Description: Complete learning assessment and assess knowledge base    Interventions:  - Provide teaching at level of understanding  - Provide teaching via preferred learning methods  Outcome: Progressing

## 2021-03-12 NOTE — ED NOTES
Patient has a large blister surrounded by redness on the medial side of his right heel  Patient has HX of diabetes  States that the area is painful to the touch  Patient states that he deos not know the cause, however admitted to wearing new shoes recently        iBll Clifford RN  03/08/19 1844 Initial (On Arrival)

## 2021-03-12 NOTE — UTILIZATION REVIEW
Notification of Discharge  This is a Notification of Discharge from our facility [address] [place] [address]  Please be advised that this patient has been discharge from our facility  Below you will find the admission and discharge date and time including the patients disposition  PRESENTATION DATE: 3/9/2021 11:26 AM  OBS ADMISSION DATE:   IP ADMISSION DATE: 3/9/21 1428   DISCHARGE DATE: 3/11/2021 12:49 PM  [facility]   Admission Orders listed below:  Admission Orders (From admission, onward)     Ordered        03/09/21 1428  Inpatient Admission  Once                   Please contact the UR Department if additional information is required to close this patient's authorization/case  [facility] Utilization Review Department  Main: [phone] x carefully listen to the prompts  All voicemails are confidential   [email or web address]  Send all requests for admission clinical reviews, approved or denied determinations and any other requests to dedicated fax number below belonging to the campus where the patient is receiving treatment   List of dedicated fax numbers:  [address] DENIALS (Administrative/Medical Necessity) [phone]   [address] (Maternity/NICU/Pediatrics) [phone]   [facility] [phone]   [facility] Pittman [phone]   [facility] [phone]   [address] [phone]   [facility]  [phone]   [address] [facility]  [address] [address] [address] [facility] [phone]

## 2021-03-14 NOTE — ASSESSMENT & PLAN NOTE
· Patient with abdominal mesenteric mass with lesions in the liver  · Patient had biopsy of liver lesion on 01/24/19  · 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  · Appreciate Oncology input  · Follow-up in 2-3 weeks as outpatient [de-identified] : 5'1" [FreeTextEntry1] : 6'1" [FreeTextEntry3] : 5'9.5"

## 2021-03-15 PROBLEM — N25.81 SECONDARY HYPERPARATHYROIDISM OF RENAL ORIGIN (HCC): Status: ACTIVE | Noted: 2021-01-01

## 2021-03-15 PROBLEM — U07.1 PNEUMONIA DUE TO COVID-19 VIRUS: Status: RESOLVED | Noted: 2021-01-01 | Resolved: 2021-01-01

## 2021-03-15 PROBLEM — N18.5 STAGE 5 CHRONIC KIDNEY DISEASE NOT ON CHRONIC DIALYSIS (HCC): Status: ACTIVE | Noted: 2021-01-01

## 2021-03-15 PROBLEM — J12.82 PNEUMONIA DUE TO COVID-19 VIRUS: Status: RESOLVED | Noted: 2021-01-01 | Resolved: 2021-01-01

## 2021-03-15 NOTE — PROGRESS NOTES
[facility] CM, Hilario Duran, called back  She is actively working with the patient until 3/26/21  Zach Colon, patient's wife, is his caregiver & very involved/active in managing the patient's healthcare needs  She manages his meds, makes his appointments, provides transportation  He is current with home health  Armin Lee states no major CM concerns, but she was unaware of his most recent admission  She is due to follow up with the patient on Wednesday  Provided OP CM, Eleazar Bai contact numbers  Armin Lee will add it to the patient's file  [place] File updated via in basket

## 2021-03-15 NOTE — PROGRESS NOTES
Nephrology   Office Follow-Up  Chloe Edouard 79 y o  male MRN: [id number]    Encounter: [id number]        Assessment & Plan    Chloe Edouard was seen in the [facility] office today  All diagnoses and orders for visit:     1  Stage 5 chronic kidney disease not on chronic dialysis (HCC)  · eGFR remains around 10-11 mil/min  Is very tired but no other uremic symptoms/without asterixis  Still does not want renal replacement therapy  If he changes his mind, will refer to appropriate specialist  Otherwise, will refer to hospice when appropriate  Continue to avoid nephrotoxins  RTO 2 months  -     Comprehensive metabolic panel; Future; Expected date: 04/15/2021   -     CBC and differential; Future; Expected date: 04/15/2021   -     sodium bicarbonate 650 mg tablet; Take 1 tablet (650 mg total) by mouth 2 (two) times a day  2  Acidosis  · Bicarbonate discontinued during acute care  Concerned that frequent diarrhea plus severe renal dysfunction puts him at increased risk for acidosis  Restart sodium bicarbonate and check labs in 2 weeks  Will de-escalate if appropriate  -     sodium bicarbonate 650 mg tablet; Take 1 tablet (650 mg total) by mouth 2 (two) times a day  3  Secondary hyperparathyroidism of renal origin ([facility] [address] )   -     Phosphorus; Future; Expected date: 04/15/2021   -     PTH, intact; Future; Expected date: 04/15/2021   -     Magnesium; Future; Expected date: 04/15/2021  4  Diarrhea, unspecified type   -     loperamide (IMODIUM) 2 mg capsule; Take 1 capsule (2 mg total) by mouth 3 (three) times a day as needed f//////or diarrhea  Continue loperamide  -     sodium bicarbonate 650 mg tablet; Take 1 tablet (650 mg total) by mouth 2 (two) times a day  5  Metastatic neuroendocrine tumor  · continue follow-up with hematology/oncology   6  Hypertensive kidney disease with stage 5 chronic kidney disease, not on chronic dialysis ([facility] [address] )  · Procardia discontinued during acute care stay  Blood pressure is controlled  7  Urinary retention  · With chronic indwelling urinary catheter     HPI: Enma Heredia is a 79 y o  male with an active problem list significant for CKD stage 5 not on dialysis, MBD-CKD, anemia of CKD, metastatic neuroendocrine tumor  who is here for hospital follow-up  Hospitalized at [address] [place] [address] 3/9-3/11/21 for diarrhea, treated for UTI and found to have worsening liver metastases  No blood work to review since hospitalization  The medical record, including Care Everywhere and media tabs were reviewed  ROS:   Review of Systems   Constitutional: Positive for appetite change  HENT: Negative  Eyes: Negative  Respiratory: Negative  Cardiovascular: Negative  Gastrointestinal: Negative  Endocrine: Negative  Genitourinary: Negative  Musculoskeletal: Negative  Allergic/Immunologic: Negative  Neurological: Negative  Hematological: Negative  Psychiatric/Behavioral: Negative  All other systems reviewed and are negative  Allergies: Patient has no known allergies      Medications:   Current Outpatient Medications:     acetaminophen (TYLENOL) 325 mg tablet, Take 650 mg by mouth every 6 (six) hours as needed , Disp: , Rfl:     ascorbic acid (VITAMIN C) 1000 MG tablet, Take 1 tablet (1,000 mg total) by mouth every 12 (twelve) hours for 6 doses, Disp: 6 tablet, Rfl: 0    aspirin 81 MG tablet, Take 81 mg by mouth daily, Disp: , Rfl:     B Complex-C-Folic Acid (triphrocaps) 1 MG CAPS, Take 1 capsule (1 mg total) by mouth daily with dinner, Disp: 90 capsule, Rfl: 3    cholecalciferol (VITAMIN D3) 1,000 units tablet, Take 1,000 Units by mouth daily Take 2, Disp: , Rfl:     loperamide (IMODIUM) 2 mg capsule, Take 1 capsule (2 mg total) by mouth 3 (three) times a day as needed for diarrhea, Disp: 30 capsule, Rfl: 0    magnesium oxide (MAG-OX) 400 mg, Take 0 5 tablets (200 mg total) by mouth 2 (two) times a day, Disp: , Rfl:     metoprolol tartrate (LOPRESSOR) 25 mg tablet, Take 0 5 tablets (12 5 mg total) by mouth every 12 (twelve) hours, Disp: 60 tablet, Rfl: 0    pantoprazole (PROTONIX) 40 mg tablet, Take 1 tablet (40 mg total) by mouth daily, Disp: 90 tablet, Rfl: 1    tamsulosin (FLOMAX) 0 4 mg, Take 1 capsule (0 4 mg total) by mouth daily with dinner, Disp: 90 capsule, Rfl: 3    vitamin B-12 (VITAMIN B-12) 1,000 mcg tablet, Take 100 mcg by mouth daily , Disp: , Rfl:     zinc sulfate (ZINCATE) 220 mg capsule, Take 1 capsule (220 mg total) by mouth daily for 3 doses, Disp: 3 capsule, Rfl: 0    sodium bicarbonate 650 mg tablet, Take 1 tablet (650 mg total) by mouth 2 (two) times a day, Disp: 90 tablet, Rfl: 1    Past Medical History:   Diagnosis Date    Acute pancreatitis without infection or necrosis 9/26/2019    Age-related nuclear cataract, right 11/5/2020    Anemia of chronic renal failure     BPH (benign prostatic hyperplasia)     CKD (chronic kidney disease) stage 5, GFR less than 15 ml/min (HCC)     Coronary artery disease     COVID-19     DJD (degenerative joint disease)     Essential hypertension     Gait disturbance     Generalized weakness     GERD (gastroesophageal reflux disease)     Gout     History of transfusion     Hydronephrosis     Hyperlipidemia     Hypertension     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)     Pressure injury of skin     Proteinuria     Sepsis ([facility] [address] ) 3/11/2020    Thrombophlebitis migrans     Type 2 diabetes mellitus      Past Surgical History:   Procedure Laterality Date    CATARACT EXTRACTION Right 11/05/2020    CATARACT EXTRACTION W/ INTRAOCULAR LENS IMPLANT Right 11/5/2020    Procedure: EYE OD CATARACT EXTRACTION WITH IOL INSERTION;  Surgeon: Angle Galdamez MD;  Location: [facility] MAIN OR;  Service: Ophthalmology    CHOLECYSTECTOMY      CHOLECYSTECTOMY LAPAROSCOPIC N/A 2/7/2020    Procedure: CHOLECYSTECTOMY LAPAROSCOPIC;  Surgeon:  Manuel Aleman MD;  Location: [facility] MAIN OR;  Service: Corrine WEIR ANGIOPLASTY WITH STENT PLACEMENT      FL RETROGRADE PYELOGRAM  5/21/2020    IR BIOPSY LIVER MASS  1/24/2019    IR CHOLECYSTOSTOMY TUBE PLACEMENT  9/6/2019    IR NON-TUNNELED CENTRAL LINE PLACEMENT  1/28/2021    [place] CYSTO/URETERO W/LITHOTRIPSY &INDWELL STENT INSRT Left 5/21/2020    Procedure: CYSTOSCOPY URETEROSCOPY WITH LITHOTRIPSY HOLMIUM LASER, RETROGRADE PYELOGRAM AND INSERTION STENT URETERAL;  Surgeon: Alexander Betancourt MD;  Location: [facility] MAIN OR;  Service: Urology    [place] CYSTOURETHROSCOPY,URETER CATHETER Left 4/21/2020    Procedure: CYSTOSCOPY WITH INSERTION STENT URETERAL;  Surgeon: Phylliss Halsted, MD;  Location[facility];  Service: Urology     Family History   Problem Relation Age of Onset    Cancer Mother     Hypertension Father     Cancer Brother     Cancer Maternal Grandmother     Cancer Paternal Aunt       reports that he has never smoked  He has never used smokeless tobacco  He reports that he does not drink alcohol or use drugs  Physical Exam:   Vitals:    03/15/21 0956   BP: 136/74   Pulse: 61   SpO2: 99%   Weight: 72 6 kg (160 lb)   Height: 5' 8" (1 727 m)     Body mass index is 24 33 kg/m²  General: conscious, cooperative, in no acute distress, appears stated age, chronically ill appearing  Eyes: conjunctivae pale, anicteric sclerae  ENT: lips and mucous membranes moist  Neck: supple, no JVD, no masses  Chest:  essentially clear breath sounds bilaterally, no crackles, ronchus or wheezings  CVS: S1 & S2, normal rate, regular rhythm  Abdomen: soft, non-tender, non-distended, normoactive bowel sounds, cachectic  Extremities: no edema of both legs  Skin: no rash   Neuro: awake, alert, oriented       Diagnostic Data:  Lab: I have personally reviewed pertinent lab results  ,   CBC:  Results from last 7 days   Lab Units 03/11/21  0610   WBC Thousand/uL 8 42   HEMOGLOBIN g/dL 8 7*   HEMATOCRIT % 26 4*   PLATELETS Thousands/uL 147*      CMP: No results found for: SODIUM, K, CL, CO2, ANIONGAP, BUN, CREATININE, GLUCOSE, CALCIUM, AST, ALT, ALKPHOS, PROT, BILITOT, EGFR,   PT/INR: No results found for: PT, INR,   Magnesium: No components found for: MAG,  Phosphorous: No results found for: PHOS    Patient Instructions   Blood work in 2 weeks  Restart sodium bicarbonate like we discussed      Portions of the record may have been created with voice recognition software  Occasional wrong word or "sound a like" substitutions may have occurred due to the inherent limitations of voice recognition software  Read the chart carefully and recognize, using context, where substitutions have occurred  If you have any questions, please contact the dictating provider

## 2021-03-16 NOTE — TELEPHONE ENCOUNTER
Ondina Arenas called from [facility] patient blood pressure is 200/90      Nurse will be sending patient to ED

## 2021-03-16 NOTE — PROGRESS NOTES
Outpatient Care Management Note:  In basket referral on rising risk patient  Active with [facility] CM, Rosa Laura  Placed on CM surveillance  Will follow up with Rosa Laura after 3/26/2021 to determine if CM period extended  Chart review completed

## 2021-03-17 NOTE — PROGRESS NOTES
Transition of Care  Follow-up After Hospitalization    Kassidy Romero 79 y o  male   Date:  3/17/2021    TCM Call (since 2/14/2021)     Date and time call was made  3/17/2021  9:54 AM    Hospital care reviewed  Records reviewed    Patient was hospitialized at  [address]        Date of Admission  03/09/21    Date of discharge  03/11/21    Diagnosis  Suspected C diff related diarrhea    Disposition  Home    Were the patients medications reviewed and updated  Yes    Current Symptoms  Weakness      TCM Call (since 2/14/2021)     Post hospital issues  Reduced activity; Poor ADL (Activities of Daily Living) performance    Should patient be enrolled in anticoag monitoring? No    Scheduled for follow up? Yes    Did you obtain your prescribed medications  Yes    Do you need help managing your prescriptions or medications  Yes    Is transportation to your appointment needed  No    Specify why  Unable to drive     I have advised the patient to call PCP with any new or worsening symptoms  Rema Chavira MA    Living Arrangements  Spouse or Significiant other    Support System  Family; Spouse    The type of support provided  Physical; Emotional    Do you have social support  Yes, as much as I need    Are you recieving any outpatient services  Yes    Are you recieving home care services  Yes    Types of home care services  Nurse visit    Are you using any community resources  No    Current waiver services  No    Have you fallen in the last 12 months  Yes    Interperter language line needed  No    Counseling  Family    Counseling topics  Diagnostic results        Admit Date: 3/9/21  Discharge Date: 3/11/21  Diagnosis: dehydration, diarrhea, R/O C-Diff  Location: [facility] records were reviewed  Medications upon discharge reviewed/updated  Medication Changes: D/C Nifedipine, Famotidine, Sodium Bicarb (restarted by Nephrology 3/15), sucralfate    Imaging: CXR, CT ABD  Consults: GI, ID  Discharge Disposition: [facility] with [facility]  Follow up visits with other specialists: Nephrology (3/15) Heme/Onc      Assessment and Plan:    Demian Espinosa was seen today for transition of care management  Diagnoses and all orders for this visit:    Encounter for support and coordination of transition of care    Type 2 diabetes mellitus with hyperglycemia, with long-term current use of insulin ([facility])  -     insulin lispro (HumaLOG) 100 units/mL injection; Inject 4 Units under the skin 3 (three) times a day with meals  -     Discontinue: insulin detemir (LEVEMIR FLEXTOUCH) 100 Units/mL injection pen; Inject 5 Units under the skin daily at bedtime  -     insulin detemir (LEVEMIR) 100 units/mL subcutaneous injection; Inject 5 Units under the skin daily at bedtime    Essential hypertension  Comments:  nifedipine D/C at hospital D/C  BP progressively increasing since being home  Recommend re-starting nifedipine  Orders:  -     NIFEdipine (PROCARDIA XL) 60 mg 24 hr tablet; Take 1 tablet (60 mg total) by mouth daily    Atrial fibrillation, unspecified type ([place] [id number] )    Coronary artery disease involving native coronary artery of native heart without angina pectoris    CKD (chronic kidney disease) stage 5, GFR less than 15 ml/min ([facility])    Hypertensive kidney disease with stage 5 chronic kidney disease, not on chronic dialysis ([facility])    Stage 5 chronic kidney disease not on chronic dialysis ([place] [id number] )    Urinary retention    Urinary tract infection associated with indwelling urethral catheter, initial encounter ([place] [id number] )        HPI:  Radha Lugo present for TCM visit s/p hospitalization for dehydration, UTI, R/O C-diff  Note Pt has has elevated BP and that the the AVS D/C'ed Nifedipine ER 60mg QD  Wife states that since D/C the BP has continued to increased  BP ranges 200 SBP and 80-90 DBP  Noted following in hospital D/C note dated 3/11/21:    "Patient had elevated blood pressure at home   Continue Lopressor 12 5 mg q 12 at home, continue to hold, continue nifedipine ER 60 mg once daily Blood pressure monitoring "    This D/C may had been unintentional and advise wife to restart this  He denies SOBE, SOB, CP, HAA, Dizziness, vision changes  Pt wife states that Nephrology started Pt on nifedipine ER and does not understand why this was D/C'ed at hospital as Pt BP was "well controlled" while taking medication  I have reached out to nephrologist and advised of today's findings along with intention of restarting nifedipine  A close of chart have not heard back disfavoring restarting nifedipine  Noted other medications were removed from [facility] and when Pt was at nephrology, the Sodium Bicarb was restarted  Hypertension  This is a chronic problem  The problem is uncontrolled  Pertinent negatives include no anxiety, blurred vision, chest pain, headaches, neck pain, orthopnea, palpitations, peripheral edema, PND or shortness of breath  There are no associated agents to hypertension  Risk factors for coronary artery disease include diabetes mellitus, dyslipidemia and male gender  Past treatments include beta blockers  Compliance problems include exercise  Hypertensive end-organ damage includes kidney disease, CAD/MI and heart failure  There is no history of angina, CVA, left ventricular hypertrophy, PVD or retinopathy  Identifiable causes of hypertension include chronic renal disease  There is no history of a hypertension causing med, sleep apnea or a thyroid problem  ROS: Review of Systems   Constitutional: Positive for appetite change  HENT: Negative  Eyes: Negative  Negative for blurred vision  Respiratory: Negative  Negative for shortness of breath  Cardiovascular: Negative  Negative for chest pain, palpitations, orthopnea and PND  Gastrointestinal: Negative  Endocrine: Negative  Genitourinary: Negative  Chronic indwelling cath   Musculoskeletal: Positive for gait problem (W/C)  Negative for neck pain  Skin: Negative  Allergic/Immunologic: Negative  Neurological: Negative for headaches  Hematological: Negative  Psychiatric/Behavioral: Negative  Past Medical History:   Diagnosis Date    Acute pancreatitis without infection or necrosis 9/26/2019    Age-related nuclear cataract, right 11/5/2020    Anemia of chronic renal failure     BPH (benign prostatic hyperplasia)     CKD (chronic kidney disease) stage 5, GFR less than 15 ml/min (HCC)     Coronary artery disease     COVID-19     DJD (degenerative joint disease)     Essential hypertension     Gait disturbance     Generalized weakness     GERD (gastroesophageal reflux disease)     Gout     History of transfusion     Hydronephrosis     Hyperlipidemia     Hypertension     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)     Pressure injury of skin     Proteinuria     Sepsis ([address] ) 3/11/2020    Thrombophlebitis migrans     Type 2 diabetes mellitus        Past Surgical History:   Procedure Laterality Date    CATARACT EXTRACTION Right 11/05/2020    CATARACT EXTRACTION W/ INTRAOCULAR LENS IMPLANT Right 11/5/2020    Procedure: EYE OD CATARACT EXTRACTION WITH IOL INSERTION;  Surgeon: Elena Morrison MD;  Location: [address] [place] [address];  Service: Ophthalmology    CHOLECYSTECTOMY     [address] [place] [address] N/A 2/7/2020    Procedure: CHOLECYSTECTOMY LAPAROSCOPIC;  Surgeon:  Agustin Burks MD;  Location: [address] [place] [address] OR;  Service: General    CORONARY ANGIOPLASTY WITH STENT PLACEMENT      FL RETROGRADE PYELOGRAM  5/21/2020    IR BIOPSY LIVER MASS  1/24/2019    IR CHOLECYSTOSTOMY TUBE PLACEMENT  9/6/2019    IR NON-TUNNELED CENTRAL LINE PLACEMENT  1/28/2021    ME CYSTO/URETERO W/LITHOTRIPSY &INDWELL STENT INSRT Left 5/21/2020    Procedure: CYSTOSCOPY URETEROSCOPY WITH LITHOTRIPSY HOLMIUM LASER, RETROGRADE PYELOGRAM AND INSERTION STENT URETERAL;  Surgeon: Nhi Holguin MD;  Location: [facility];  Service: Urology    ME Yoandy Figueroa CATHETER Left 4/21/2020    Procedure: CYSTOSCOPY WITH INSERTION STENT URETERAL;  Surgeon: Moshe Randhawa MD;  [facility];  Service: Urology       Social History     Socioeconomic History    Marital status:       Spouse name: None    Number of children: None    Years of education: None    Highest education level: None   Occupational History    Occupation: Retired   Social Needs    Financial resource strain: None    Food insecurity     Worry: None     Inability: None    Transportation needs     Medical: None     Non-medical: None   Tobacco Use    Smoking status: Never Smoker    Smokeless tobacco: Never Used   Substance and Sexual Activity    Alcohol use: Never     Alcohol/week: 0 0 standard drinks     Frequency: Never    Drug use: Never    Sexual activity: Not Currently   Lifestyle    Physical activity     Days per week: 0 days     Minutes per session: 0 min    Stress: None   Relationships    Social connections     Talks on phone: None     Gets together: None     Attends Islam service: None     Active member of club or organization: None     Attends meetings of clubs or organizations: None     Relationship status: None    Intimate partner violence     Fear of current or ex partner: None     Emotionally abused: None     Physically abused: None     Forced sexual activity: None   Other Topics Concern    None   Social History Narrative    None       Family History   Problem Relation Age of Onset    Cancer Mother     Hypertension Father     Cancer Brother     Cancer Maternal Grandmother     Cancer Paternal Aunt        No Known Allergies      Current Outpatient Medications:     acetaminophen (TYLENOL) 325 mg tablet, Take 650 mg by mouth every 6 (six) hours as needed , Disp: , Rfl:     ascorbic acid (VITAMIN C) 1000 MG tablet, Take 1 tablet (1,000 mg total) by mouth every 12 (twelve) hours for 6 doses, Disp: 6 tablet, Rfl: 0    aspirin 81 MG tablet, Take 81 mg by mouth daily, Disp: , Rfl:     B Complex-C-Folic Acid (triphrocaps) 1 MG CAPS, Take 1 capsule (1 mg total) by mouth daily with dinner, Disp: 90 capsule, Rfl: 3    cholecalciferol (VITAMIN D3) 1,000 units tablet, Take 1,000 Units by mouth daily Take 2, Disp: , Rfl:     insulin lispro (HumaLOG) 100 units/mL injection, Inject 4 Units under the skin 3 (three) times a day with meals, Disp: 10 mL, Rfl: 5    loperamide (IMODIUM) 2 mg capsule, Take 1 capsule (2 mg total) by mouth 3 (three) times a day as needed for diarrhea, Disp: 30 capsule, Rfl: 0    magnesium oxide (MAG-OX) 400 mg, Take 0 5 tablets (200 mg total) by mouth 2 (two) times a day (Patient taking differently: Take 200 mg by mouth daily ), Disp: , Rfl:     metoprolol tartrate (LOPRESSOR) 25 mg tablet, Take 0 5 tablets (12 5 mg total) by mouth every 12 (twelve) hours, Disp: 60 tablet, Rfl: 0    sodium bicarbonate 650 mg tablet, Take 1 tablet (650 mg total) by mouth 2 (two) times a day, Disp: 90 tablet, Rfl: 1    tamsulosin (FLOMAX) 0 4 mg, Take 1 capsule (0 4 mg total) by mouth daily with dinner, Disp: 90 capsule, Rfl: 3    vitamin B-12 (VITAMIN B-12) 1,000 mcg tablet, Take 100 mcg by mouth daily , Disp: , Rfl:     insulin detemir (LEVEMIR) 100 units/mL subcutaneous injection, Inject 5 Units under the skin daily at bedtime, Disp: 10 mL, Rfl: 0    NIFEdipine (PROCARDIA XL) 60 mg 24 hr tablet, Take 1 tablet (60 mg total) by mouth daily, Disp: 90 tablet, Rfl: 1    pantoprazole (PROTONIX) 40 mg tablet, Take 1 tablet (40 mg total) by mouth daily (Patient not taking: Reported on 3/17/2021), Disp: 90 tablet, Rfl: 1    zinc sulfate (ZINCATE) 220 mg capsule, Take 1 capsule (220 mg total) by mouth daily for 3 doses, Disp: 3 capsule, Rfl: 0      Physical Exam:  BP (!) 200/82 (BP Location: Right arm, Patient Position: Sitting, Cuff Size: Standard)   Pulse 65   Temp 98 6 °F (37 °C) (Tympanic)   Ht 5' 8" (1 727 m)   SpO2 97%   BMI 24 33 kg/m²     Physical Exam  Vitals signs and nursing note reviewed  Constitutional:       Appearance: Normal appearance  He is well-developed  HENT:      Head: Normocephalic and atraumatic  Right Ear: Tympanic membrane, ear canal and external ear normal       Left Ear: Tympanic membrane, ear canal and external ear normal       Nose: Nose normal       Mouth/Throat:      Mouth: Mucous membranes are moist       Pharynx: Uvula midline  Eyes:      General: Lids are normal       Conjunctiva/sclera: Conjunctivae normal       Pupils: Pupils are equal, round, and reactive to light  Neck:      Musculoskeletal: Full passive range of motion without pain, normal range of motion and neck supple  Thyroid: No thyroid mass  Vascular: No JVD  Trachea: Trachea and phonation normal    Cardiovascular:      Rate and Rhythm: Normal rate and regular rhythm  Pulses: Normal pulses  Heart sounds: Normal heart sounds, S1 normal and S2 normal  No murmur  No friction rub  No gallop  Pulmonary:      Effort: Pulmonary effort is normal       Breath sounds: Normal breath sounds  Abdominal:      General: Bowel sounds are normal       Palpations: Abdomen is soft  Tenderness: There is no abdominal tenderness  Comments: cachectic   Genitourinary:     Comments: Deferred  Musculoskeletal: Normal range of motion  Right lower leg: No edema  Left lower leg: No edema  Skin:     General: Skin is warm and dry  Capillary Refill: Capillary refill takes less than 2 seconds  Neurological:      General: No focal deficit present  Mental Status: He is alert and oriented to person, place, and time  Cranial Nerves: Cranial nerves are intact  Sensory: Sensation is intact  Motor: Motor function is intact  Coordination: Coordination is intact  Gait: Gait is intact     Psychiatric:         Attention and Perception: Attention and perception normal          Mood and Affect: Mood and affect normal  Speech: Speech normal          Behavior: Behavior normal  Behavior is cooperative  Thought Content:  Thought content normal          Cognition and Memory: Cognition normal          Judgment: Judgment normal              Labs:  Lab Results   Component Value Date    WBC 8 42 03/11/2021    HGB 8 7 (L) 03/11/2021    HCT 26 4 (L) 03/11/2021    MCV 91 03/11/2021     (L) 03/11/2021     Lab Results   Component Value Date    K 3 7 03/11/2021     03/11/2021    CO2 22 03/11/2021    BUN 43 (H) 03/11/2021    CREATININE 4 73 (H) 03/11/2021    GLUF 84 01/08/2021    CALCIUM 7 9 (L) 03/11/2021    CORRECTEDCA 9 6 03/10/2021    AST 17 03/10/2021    ALT 19 03/10/2021    ALKPHOS 154 (H) 03/10/2021    EGFR 11 03/11/2021

## 2021-03-18 PROBLEM — E34.0 CARCINOID SYNDROME (HCC): Chronic | Status: ACTIVE | Noted: 2019-04-22

## 2021-03-18 PROBLEM — K21.9 GASTROESOPHAGEAL REFLUX DISEASE WITHOUT ESOPHAGITIS: Chronic | Status: ACTIVE | Noted: 2020-03-23

## 2021-03-18 PROBLEM — R09.02 HYPOXIA: Status: ACTIVE | Noted: 2021-01-01

## 2021-03-18 PROBLEM — I51.89 DIASTOLIC DYSFUNCTION: Chronic | Status: ACTIVE | Noted: 2020-03-13

## 2021-03-18 PROBLEM — R65.10 SIRS (SYSTEMIC INFLAMMATORY RESPONSE SYNDROME) (HCC): Status: RESOLVED | Noted: 2020-01-01 | Resolved: 2021-01-01

## 2021-03-18 PROBLEM — R55 SYNCOPE AND COLLAPSE: Chronic | Status: RESOLVED | Noted: 2019-01-27 | Resolved: 2021-01-01

## 2021-03-18 PROBLEM — C7A.8 MALIGNANT NEUROENDOCRINE NEOPLASM (HCC): Chronic | Status: ACTIVE | Noted: 2019-07-16

## 2021-03-18 PROBLEM — N18.5 CKD (CHRONIC KIDNEY DISEASE) STAGE 5, GFR LESS THAN 15 ML/MIN (HCC): Chronic | Status: ACTIVE | Noted: 2020-01-01

## 2021-03-18 PROBLEM — R33.9 URINARY RETENTION: Chronic | Status: ACTIVE | Noted: 2020-01-01

## 2021-03-18 NOTE — ASSESSMENT & PLAN NOTE
· Brief episode of hypoxia and respiratory distress while receiving infusion - Patient noted to be 75% at infusion center, he was brought to the ER and nasal cannula in place however oxygen not turned on and improved to 100% room air    Currently he is % on room air on his hospital vitals  · Possibly stress/anxiety related after discussion with Oncology regarding progression of his disease and discussed hospice - patient wants to pursue treatment per Hematology no  · Rule out PE with V/Q scan -currently on a heparin drip  · Patient was given IV antibiotics for concern of pneumonia - initial procalcitonin negative, follow-up a m  procalcitonin - if this is negative discontinue antibiotics, his WBCs are normal, afebrile  · Rule out acute coronary syndrome with monitoring troponins and telemetry

## 2021-03-18 NOTE — PROGRESS NOTES
Hematology/Oncology Outpatient Follow-up  Jerry Rosenbaum 79 y o  male 1940 [id number]    Date:  3/18/2021        Assessment and Plan:  1  Malignant neuroendocrine neoplasm [facility])    The patient and his the caregiver were educated about the CT scan which was done during the recent hospitalization of the abdomen and pelvis which revealed progression of his low-grade neuroendocrine tumor mainly in the liver  The patient unfortunately is not a candidate for any other type of treatment beyond the lanreotide which need to be continued on every 4 week basis  I did discuss briefly the option of hospice  The patient stated that he would like to continue with active treatment  - CBC and differential; Future  - Comprehensive metabolic panel; Future  - C-reactive protein; Future  - Sedimentation rate, automated; Future  - Chromogranin A; Future    2  Carcinoid syndrome (HCC)    The diarrhea is most likely related to the progression of his neuroendocrine   Metastatic malignancy  He seems to have good control of the diarrhea with the Imodium 3 times a day  I did explain to the caregiver and to the patient that the Imodium can be increased to every 2 hours as needed we if he continues to have diarrhea  The other option is to add Lomotil or Zofran p r n  3  Anemia, unspecified type    The patient has anemia of multiple etiologies  He was offered to get blood transfusion since he seems to be symptomatic  He was not enthusiastic about getting blood transfusion for the time being   - CBC and differential; Future  - Comprehensive metabolic panel; Future        HPI:    The patient came today for a follow-up visit accompanied by his significant other  He was recently admitted to the hospital due to worsening diarrhea and UTI due to chronic indwelling catheter  The patient was discharged from the hospital on 03/11/2021  He is currently taking more Imodium which is controlling his diarrhea according to his caregiver  He had CT scan of the  Abdomen and pelvis without contrast on 03/09/2021 which showed:  IMPRESSION:     1  Apparent progression of numerous hepatic lesions likely secondary to metastatic disease in the setting of carcinoid syndrome      2  Stable, partially calcified dominant mesenteric mass presumably carcinoid tumor      3  Stable omental nodularity in keeping with peritoneal carcinomatosis       4  No evidence of bowel obstruction  Questionable thickening of the sigmoid colon which may be artifactual due to noncontrast imaging  No pericolonic inflammatory changes      5  Small amount of abdominal ascites and bilateral pleural effusions (right larger than left), similar  his most recent blood work on 03/11/2021 showed hemoglobin of 8 7 with normal white cells  The platelet count is in the low-normal range of 147  The white cell differential is within normal range  Creatinine was 4 7 with calcium of 7 9  He denies bleeding from any sites  Oncology History Overview Note   Patient has multiple comorbid conditions including history of coronary artery disease, diabetes mellitus, hypertension, hyperlipidemia, and more recently metastatic neuroendocrine tumor       The patient was seen in consultation when he was in the hospital on the 5th February 2019 at that time he had significant weakness status post vasovagal syncope   He was evaluated with a CT scan of the abdomen and pelvis on the 23rd January 2019 which showed partially calcified mesenteric mass with multiple liver lesions compatible most likely with carcinoid malignancy   He also was found to have bilateral hydronephrosis and severe bladder wall thickening with prostatomegaly      The patient then had a core biopsy from 1 of the liver lesions on the 24th of January which showed well-differentiated neuroendocrine tumor grade 1-2 with Ki 67 of 3-5%         His chromogranin level  February 2019 was 273   His 24 hour urine for HIAA was 48 which is above normal   The patient was started on Lanreotide in March 2019         Metastatic neuroendocrine tumor   1/24/2019 Initial Diagnosis    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 [address] )     1/24/2019 Biopsy    A  Liver mass, core biopsy:  - Well differentiated neuroendocrine tumor, G2        3/2019 -  Chemotherapy    Lanreotide 120 mg every 4 weeks injections          Interval history:    ROS: Review of Systems   Constitutional: Positive for fatigue  Negative for chills and fever  HENT: Negative for ear pain and sore throat  Eyes: Negative for pain and visual disturbance  Respiratory: Negative for cough and shortness of breath  Cardiovascular: Negative for chest pain and palpitations  Gastrointestinal: Positive for diarrhea  Negative for abdominal pain and vomiting  Genitourinary: Negative for dysuria and hematuria  Musculoskeletal: Negative for arthralgias and back pain  Skin: Negative for color change and rash  Neurological: Positive for dizziness and numbness  Negative for seizures and syncope  All other systems reviewed and are negative        Past Medical History:   Diagnosis Date    Acute pancreatitis without infection or necrosis 9/26/2019    Age-related nuclear cataract, right 11/5/2020    Anemia of chronic renal failure     BPH (benign prostatic hyperplasia)     CKD (chronic kidney disease) stage 5, GFR less than 15 ml/min (HCC)     Coronary artery disease     COVID-19     DJD (degenerative joint disease)     Essential hypertension     Gait disturbance     Generalized weakness     GERD (gastroesophageal reflux disease)     Gout     History of transfusion     Hydronephrosis     Hyperlipidemia     Hypertension     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 [address] ) 3/11/2020    Thrombophlebitis migrans     Type 2 diabetes mellitus        Past Surgical History:   Procedure Laterality Date    CATARACT EXTRACTION Right 11/05/2020    CATARACT EXTRACTION W/ INTRAOCULAR LENS IMPLANT Right 11/5/2020    Procedure: EYE OD CATARACT EXTRACTION WITH IOL INSERTION;  Surgeon: Atiya Choudhury MD;  Location: [facility] MAIN OR;  Service: Ophthalmology    CHOLECYSTECTOMY     [facility] N/A 2/7/2020    Procedure: [facility];  Surgeon: Zain Vázquez MD;  Location: [facility] MAIN OR;  Service: General    CORONARY ANGIOPLASTY WITH STENT PLACEMENT      FL RETROGRADE PYELOGRAM  5/21/2020    IR BIOPSY LIVER MASS  1/24/2019    IR CHOLECYSTOSTOMY TUBE PLACEMENT  9/6/2019    IR NON-TUNNELED CENTRAL LINE PLACEMENT  1/28/2021    MI CYSTO/URETERO W/LITHOTRIPSY &INDWELL STENT INSRT Left 5/21/2020    Procedure: CYSTOSCOPY URETEROSCOPY WITH LITHOTRIPSY HOLMIUM LASER, RETROGRADE PYELOGRAM AND INSERTION STENT URETERAL;  Surgeon: Kar Ratliff MD;  Location: [facility] MA[facility];  Service: Urology    MI CYSTOURETHROSCOPY,URETER CATHETER Left 4/21/2020    Procedure: CYSTOSCOPY WITH INSERTION STENT URETERAL;  Surgeon: Latoya Gutiérrez MD;  Location: [facility];  Service: Urology       Social History     Socioeconomic History    Marital status:       Spouse name: None    Number of children: None    Years of education: None    Highest education level: None   Occupational History    Occupation: Retired   Social Needs    Financial resource strain: None    Food insecurity     Worry: None     Inability: None    Transportation needs     Medical: None     Non-medical: None   Tobacco Use    Smoking status: Never Smoker    Smokeless tobacco: Never Used   Substance and Sexual Activity    Alcohol use: Never     Alcohol/week: 0 0 standard drinks     Frequency: Never    Drug use: Never    Sexual activity: Not Currently   Lifestyle    Physical activity     Days per week: 0 days     Minutes per session: 0 min    Stress: None   Relationships    Social connections     Talks on phone: None     Gets together: None Attends [employer] service: None     Active member of club or organization: None     Attends meetings of clubs or organizations: None     Relationship status: None    Intimate partner violence     Fear of current or ex partner: None     Emotionally abused: None     Physically abused: None     Forced sexual activity: None   Other Topics Concern    None   Social History Narrative    None       Family History   Problem Relation Age of Onset    Cancer Mother     Hypertension Father     Cancer Brother     Cancer Maternal Grandmother     Cancer Paternal Aunt        No Known Allergies      Current Outpatient Medications:     acetaminophen (TYLENOL) 325 mg tablet, Take 650 mg by mouth every 6 (six) hours as needed , Disp: , Rfl:     ascorbic acid (VITAMIN C) 1000 MG tablet, Take 1 tablet (1,000 mg total) by mouth every 12 (twelve) hours for 6 doses, Disp: 6 tablet, Rfl: 0    aspirin 81 MG tablet, Take 81 mg by mouth daily, Disp: , Rfl:     B Complex-C-Folic Acid (triphrocaps) 1 MG CAPS, Take 1 capsule (1 mg total) by mouth daily with dinner, Disp: 90 capsule, Rfl: 3    cholecalciferol (VITAMIN D3) 1,000 units tablet, Take 1,000 Units by mouth daily Take 2, Disp: , Rfl:     insulin detemir (LEVEMIR) 100 units/mL subcutaneous injection, Inject 5 Units under the skin daily at bedtime, Disp: 10 mL, Rfl: 0    insulin lispro (HumaLOG) 100 units/mL injection, Inject 4 Units under the skin 3 (three) times a day with meals, Disp: 10 mL, Rfl: 5    loperamide (IMODIUM) 2 mg capsule, Take 1 capsule (2 mg total) by mouth 3 (three) times a day as needed for diarrhea, Disp: 30 capsule, Rfl: 0    metoprolol tartrate (LOPRESSOR) 25 mg tablet, Take 0 5 tablets (12 5 mg total) by mouth every 12 (twelve) hours, Disp: 60 tablet, Rfl: 0    NIFEdipine (PROCARDIA XL) 60 mg 24 hr tablet, Take 1 tablet (60 mg total) by mouth daily, Disp: 90 tablet, Rfl: 1    sodium bicarbonate 650 mg tablet, Take 1 tablet (650 mg total) by mouth 2 (two) times a day, Disp: 90 tablet, Rfl: 1    tamsulosin (FLOMAX) 0 4 mg, Take 1 capsule (0 4 mg total) by mouth daily with dinner, Disp: 90 capsule, Rfl: 3    vitamin B-12 (VITAMIN B-12) 1,000 mcg tablet, Take 100 mcg by mouth daily , Disp: , Rfl:     magnesium oxide (MAG-OX) 400 mg, Take 0 5 tablets (200 mg total) by mouth 2 (two) times a day (Patient taking differently: Take 200 mg by mouth daily ), Disp: , Rfl:     pantoprazole (PROTONIX) 40 mg tablet, Take 1 tablet (40 mg total) by mouth daily (Patient not taking: Reported on 3/17/2021), Disp: 90 tablet, Rfl: 1    zinc sulfate (ZINCATE) 220 mg capsule, Take 1 capsule (220 mg total) by mouth daily for 3 doses, Disp: 3 capsule, Rfl: 0      Physical Exam:  /56 (BP Location: Left arm, Patient Position: Sitting, Cuff Size: Adult)   Pulse 64   Temp (!) 97 2 °F (36 2 °C) (Tympanic)   Resp 18   SpO2 96%     Physical Exam  Constitutional:       General: He is in acute distress  Appearance: He is well-developed  He is ill-appearing  HENT:      Head: Normocephalic and atraumatic  Eyes:      General: No scleral icterus  Right eye: No discharge  Left eye: No discharge  Conjunctiva/sclera: Conjunctivae normal       Pupils: Pupils are equal, round, and reactive to light  Neck:      Musculoskeletal: Normal range of motion and neck supple  Thyroid: No thyromegaly  Trachea: No tracheal deviation  Cardiovascular:      Rate and Rhythm: Normal rate and regular rhythm  Heart sounds: Normal heart sounds  No murmur  No friction rub  Pulmonary:      Effort: Pulmonary effort is normal  No respiratory distress  Breath sounds: Normal breath sounds  No wheezing or rales  Chest:      Chest wall: No tenderness  Abdominal:      General: There is no distension  Palpations: Abdomen is soft  There is no hepatomegaly or splenomegaly  Tenderness: There is no abdominal tenderness  There is no guarding or rebound  Musculoskeletal:         General: No tenderness or deformity  Comments: Sitting in a wheelchair   Lymphadenopathy:      Cervical: No cervical adenopathy  Skin:     General: Skin is warm and dry  Coloration: Skin is not pale  Findings: No erythema or rash  Neurological:      Mental Status: He is alert and oriented to person, place, and time  Cranial Nerves: No cranial nerve deficit  Coordination: Coordination normal       Deep Tendon Reflexes: Reflexes are normal and symmetric  Reflexes normal    Psychiatric:         Behavior: Behavior normal          Thought Content: Thought content normal          Judgment: Judgment normal            Labs:  Lab Results   Component Value Date    WBC 8 42 03/11/2021    HGB 8 7 (L) 03/11/2021    HCT 26 4 (L) 03/11/2021    MCV 91 03/11/2021     (L) 03/11/2021     Lab Results   Component Value Date    K 3 7 03/11/2021     03/11/2021    CO2 22 03/11/2021    BUN 43 (H) 03/11/2021    CREATININE 4 73 (H) 03/11/2021    GLUF 84 01/08/2021    CALCIUM 7 9 (L) 03/11/2021    CORRECTEDCA 9 6 03/10/2021    AST 17 03/10/2021    ALT 19 03/10/2021    ALKPHOS 154 (H) 03/10/2021    EGFR 11 03/11/2021     No results found for: TSH    Patient voiced understanding and agreement in the above discussion  Aware to contact our office with questions/symptoms in the interim

## 2021-03-18 NOTE — ASSESSMENT & PLAN NOTE
Lab Results   Component Value Date    HGBA1C 6 8 (H) 09/21/2020       Recent Labs     03/18/21  1835   POCGLU 249*       Blood Sugar Average: Last 72 hrs:  (P) 249   · Continue Levemir and Accu-Cheks

## 2021-03-18 NOTE — ASSESSMENT & PLAN NOTE
· Patient was seen by Hematology-Oncology today CT scans were reviewed with progression of his disease  Hospice was discussed and patient reports that he would like to continue treatment

## 2021-03-18 NOTE — ASSESSMENT & PLAN NOTE
· Patient with diarrhea felt likely secondary to progression of his neuroendocrine metastatic malignancy  · Continue Imodium

## 2021-03-18 NOTE — PROGRESS NOTES
Outpatient Care Management Note:  In basket ED alert received on Mr Marcello Rodriguez  Chart notes reviewed  Mr Marcello Rodriguez was tod that his cancer was progressing prior to being seen in the infusion center  He is not interested in hospice at this time per the office note  He was c/o SOB on arrival to the infusion center, he was transported to the ED for same  Call placed to Mr Maya Rose Marie from [facility]  Message left with patient update and my contact information  Call placed to [facility], to update her on the patient information as he is a code "R"  She was appreciative of the information

## 2021-03-18 NOTE — PLAN OF CARE
Problem: Potential for Falls  Goal: Patient will remain free of falls  Description: INTERVENTIONS:  - Assess patient frequently for physical needs  -  Identify cognitive and physical deficits and behaviors that affect risk of falls    -  [place] fall precautions as indicated by assessment   - Educate patient/family on patient safety including physical limitations  - Instruct patient to call for assistance with activity based on assessment  - Modify environment to reduce risk of injury  - Consider OT/PT consult to assist with strengthening/mobility  Outcome: Progressing

## 2021-03-18 NOTE — ASSESSMENT & PLAN NOTE
Lab Results   Component Value Date    EGFR 9 03/18/2021    EGFR 11 03/11/2021    EGFR 10 03/10/2021    CREATININE 5 56 (H) 03/18/2021    CREATININE 4 73 (H) 03/11/2021    CREATININE 4 88 (H) 03/10/2021   · With acute kidney injury  · Creatinine baseline approximately 4 5  · Likely pre renal related  · Nephrology consult  · IV fluids

## 2021-03-18 NOTE — ED PROVIDER NOTES
History  Chief Complaint   Patient presents with    Shortness of Breath     started 1/2 hour ago while at the infusion center for his chem shot, (liver cancer), pt stated it feel like when he had trouble breathing before     79-year-old male history of liver cancer presents from his infusion center while getting chemotherapy accompanied by his girlfriend complaining of shortness of breath  Patient appears to have some confusion at baseline and his girlfriend provides the majority of the history stated that he short of breath for approximately 10 minutes and that when they checked his O2 saturation that it was at 75%  They applied nasal cannula oxygen upon his presentation to emergency department, he has the nasal cannula still on but is not hooked up to any oxygen and he has 100% on room air  They state that the patient was in respiratory distress, working to breath at the time but that it has since subsided  Patient also reports a dull ache in his chest without radiation  The patient denies any fevers, chills, headache, dizziness, sore throat, cough, pleuritic chest pain, abdominal pain, nausea, vomiting, diarrhea, dysuria, polyuria, hematuria, melena, hematochezia, lower leg pain or swelling  Prior to Admission Medications   Prescriptions Last Dose Informant Patient Reported? Taking?    B Complex-C-Folic Acid (triphrocaps) 1 MG CAPS  Spouse/Significant Other No No   Sig: Take 1 capsule (1 mg total) by mouth daily with dinner   NIFEdipine (PROCARDIA XL) 60 mg 24 hr tablet  Spouse/Significant Other No No   Sig: Take 1 tablet (60 mg total) by mouth daily   acetaminophen (TYLENOL) 325 mg tablet  Spouse/Significant Other Yes No   Sig: Take 650 mg by mouth every 6 (six) hours as needed    ascorbic acid (VITAMIN C) 1000 MG tablet  Spouse/Significant Other No No   Sig: Take 1 tablet (1,000 mg total) by mouth every 12 (twelve) hours for 6 doses   aspirin 81 MG tablet  Spouse/Significant Other Yes No   Sig: Take 81 mg by mouth daily   cholecalciferol (VITAMIN D3) 1,000 units tablet  Spouse/Significant Other Yes No   Sig: Take 1,000 Units by mouth daily Take 2   insulin detemir (LEVEMIR) 100 units/mL subcutaneous injection  Spouse/Significant Other No No   Sig: Inject 5 Units under the skin daily at bedtime   insulin lispro (HumaLOG) 100 units/mL injection  Spouse/Significant Other No No   Sig: Inject 4 Units under the skin 3 (three) times a day with meals   loperamide (IMODIUM) 2 mg capsule  Spouse/Significant Other No No   Sig: Take 1 capsule (2 mg total) by mouth 3 (three) times a day as needed for diarrhea   magnesium oxide (MAG-OX) 400 mg  Spouse/Significant Other No No   Sig: Take 0 5 tablets (200 mg total) by mouth 2 (two) times a day   Patient taking differently: Take 200 mg by mouth daily    metoprolol tartrate (LOPRESSOR) 25 mg tablet  Spouse/Significant Other No No   Sig: Take 0 5 tablets (12 5 mg total) by mouth every 12 (twelve) hours   pantoprazole (PROTONIX) 40 mg tablet  Spouse/Significant Other No No   Sig: Take 1 tablet (40 mg total) by mouth daily   Patient not taking: Reported on 3/17/2021   sodium bicarbonate 650 mg tablet  Spouse/Significant Other No No   Sig: Take 1 tablet (650 mg total) by mouth 2 (two) times a day   tamsulosin (FLOMAX) 0 4 mg  Spouse/Significant Other No No   Sig: Take 1 capsule (0 4 mg total) by mouth daily with dinner   vitamin B-12 (VITAMIN B-12) 1,000 mcg tablet  Spouse/Significant Other Yes No   Sig: Take 100 mcg by mouth daily    zinc sulfate (ZINCATE) 220 mg capsule   No No   Sig: Take 1 capsule (220 mg total) by mouth daily for 3 doses      Facility-Administered Medications: None       Past Medical History:   Diagnosis Date    Acute pancreatitis without infection or necrosis 9/26/2019    Age-related nuclear cataract, right 11/5/2020    Anemia of chronic renal failure     BPH (benign prostatic hyperplasia)     CKD (chronic kidney disease) stage 5, GFR less than 15 ml/min [facility])     Coronary artery disease     COVID-19     DJD (degenerative joint disease)     Essential hypertension     Gait disturbance     Generalized weakness     GERD (gastroesophageal reflux disease)     Gout     History of transfusion     Hydronephrosis     Hyperlipidemia     Hypertension     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)     Pressure injury of skin     Proteinuria     Sepsis ([address] ) 3/11/2020    Thrombophlebitis migrans     Type 2 diabetes mellitus        Past Surgical History:   Procedure Laterality Date    CATARACT EXTRACTION Right 11/05/2020    CATARACT EXTRACTION W/ INTRAOCULAR LENS IMPLANT Right 11/5/2020    Procedure: EYE OD CATARACT EXTRACTION WITH IOL INSERTION;  Surgeon: Pravin Cevallos MD;  Location: [facility] MAIN OR;  Service: Ophthalmology    CHOLECYSTECTOMY     [address] [place] [address] N/A 2/7/2020    Procedure: CHOLECYSTECTOMY LAPAROSCOPIC;  Surgeon: Kojo Salguero MD;  Location: [facility] MAIN OR;  Service: General    CORONARY ANGIOPLASTY WITH STENT PLACEMENT      FL RETROGRADE PYELOGRAM  5/21/2020    IR BIOPSY LIVER MASS  1/24/2019    IR CHOLECYSTOSTOMY TUBE PLACEMENT  9/6/2019    IR NON-TUNNELED CENTRAL LINE PLACEMENT  1/28/2021    VT CYSTO/URETERO W/LITHOTRIPSY &INDWELL STENT INSRT Left 5/21/2020    Procedure: CYSTOSCOPY URETEROSCOPY WITH LITHOTRIPSY HOLMIUM LASER, RETROGRADE PYELOGRAM AND INSERTION STENT URETERAL;  Surgeon: Enrique Eric MD;  Location: [facility] MAIN OR;  Service: Urology    VT CYSTOURETHROSCOPY,URETER CATHETER Left 4/21/2020    Procedure: CYSTOSCOPY WITH INSERTION STENT URETERAL;  Surgeon: Carolynn Rhodes MD;  [facility];  Service: Urology       Family History   Problem Relation Age of Onset    Cancer Mother     Hypertension Father     Cancer Brother     Cancer Maternal Grandmother     Cancer Paternal Aunt      I have reviewed and agree with the history as documented      E-Cigarette/Vaping    E-Cigarette Use Never User E-Cigarette/Vaping Substances    Nicotine No     THC No     CBD No     Flavoring No     Other No     Unknown No      Social History     Tobacco Use    Smoking status: Never Smoker    Smokeless tobacco: Never Used   Substance Use Topics    Alcohol use: Never     Alcohol/week: 0 0 standard drinks     Frequency: Never    Drug use: Never       Review of Systems   Constitutional: Negative for chills, fatigue and fever  HENT: Negative for congestion and sore throat  Eyes: Negative for pain  Respiratory: Positive for shortness of breath  Negative for cough, chest tightness and wheezing  Cardiovascular: Positive for chest pain  Negative for palpitations and leg swelling  Gastrointestinal: Negative for abdominal pain, constipation, diarrhea, nausea and vomiting  Endocrine: Negative for polyuria  Genitourinary: Negative for dysuria  Musculoskeletal: Negative for arthralgias, back pain, myalgias and neck pain  Skin: Negative for rash  Neurological: Positive for numbness  Negative for dizziness, syncope, light-headedness and headaches  All other systems reviewed and are negative  Physical Exam  Physical Exam  Vitals signs reviewed  Constitutional:       Appearance: He is well-developed  He is ill-appearing  HENT:      Head: Normocephalic and atraumatic  Mouth/Throat:      Mouth: Mucous membranes are moist    Eyes:      Conjunctiva/sclera: Conjunctivae normal    Neck:      Musculoskeletal: Normal range of motion  Cardiovascular:      Rate and Rhythm: Normal rate and regular rhythm  Heart sounds: Normal heart sounds  Pulmonary:      Effort: Pulmonary effort is normal       Breath sounds: Normal breath sounds  Abdominal:      General: Bowel sounds are normal       Palpations: Abdomen is soft  Tenderness: There is no abdominal tenderness  Genitourinary:     Comments: Indwelling garcia  Musculoskeletal: Normal range of motion        Right lower leg: He exhibits no tenderness  No edema  Left lower leg: He exhibits no tenderness  No edema  Skin:     General: Skin is warm and dry  Capillary Refill: Capillary refill takes less than 2 seconds  Neurological:      General: No focal deficit present  Mental Status: He is alert and oriented to person, place, and time  Comments: No dysarthria, nystagmus, dysphagia, diplopia, aphasia, vertigo, ataxia, visions loss, dysmetria  Normal finger to nose, strength and sensation in bilat upper and lower extremities  No pronator drift  Normal heel to shin  EOMI, no visual field defects  CN 2-12 intact  GCS 15  AOx3           Vital Signs  ED Triage Vitals   Temperature Pulse Respirations Blood Pressure SpO2   03/18/21 1431 03/18/21 1426 03/18/21 1426 03/18/21 1426 03/18/21 1426   (!) 96 3 °F (35 7 °C) 75 17 118/55 100 %      Temp Source Heart Rate Source Patient Position - Orthostatic VS BP Location FiO2 (%)   03/18/21 1431 03/18/21 1426 03/18/21 1426 03/18/21 1426 --   Tympanic Monitor Sitting Left arm       Pain Score       03/18/21 1733       No Pain           Vitals:    03/18/21 1426 03/18/21 1733 03/18/21 1824   BP: 118/55 132/62    Pulse: 75 86 81   Patient Position - Orthostatic VS: Sitting Lying          Visual Acuity      ED Medications  Medications   heparin (porcine) 25,000 units in 0 45% NaCl 250 mL infusion (premix) (18 Units/kg/hr × 70 kg (Order-Specific) Intravenous New Bag 3/18/21 1709)   aspirin chewable tablet 81 mg (has no administration in time range)   b complex-vitamin C-folic acid (NEPHROCAPS) capsule 1 capsule (1 capsule Oral Given 3/18/21 1849)   cholecalciferol (VITAMIN D3) tablet 1,000 Units (has no administration in time range)   insulin detemir (LEVEMIR) subcutaneous injection 5 Units (has no administration in time range)   insulin lispro (HumaLOG) 100 units/mL subcutaneous injection 4 Units (has no administration in time range)   metoprolol tartrate (LOPRESSOR) partial tablet 12 5 mg (has no administration in time range)   NIFEdipine (PROCARDIA XL) 24 hr tablet 60 mg (has no administration in time range)   pantoprazole (PROTONIX) EC tablet 40 mg (has no administration in time range)   sodium bicarbonate tablet 650 mg (650 mg Oral Given 3/18/21 1849)   tamsulosin (FLOMAX) capsule 0 4 mg (0 4 mg Oral Given 3/18/21 1849)   cyanocobalamin (VITAMIN B-12) tablet 100 mcg (has no administration in time range)   sodium chloride 0 9 % infusion (50 mL/hr Intravenous New Bag 3/18/21 1850)   acetaminophen (TYLENOL) tablet 650 mg (has no administration in time range)   docusate sodium (COLACE) capsule 100 mg (100 mg Oral Refused 3/18/21 1850)   ondansetron (ZOFRAN) injection 4 mg (has no administration in time range)   insulin lispro (HumaLOG) 100 units/mL subcutaneous injection 1-5 Units (has no administration in time range)   insulin lispro (HumaLOG) 100 units/mL subcutaneous injection 1-5 Units (has no administration in time range)   cefTRIAXone (ROCEPHIN) IVPB (premix in dextrose) 1,000 mg 50 mL (0 mg Intravenous Stopped 3/18/21 1606)   sodium chloride 0 9 % bolus 1,000 mL (0 mL Intravenous Stopped 3/18/21 1635)       Diagnostic Studies  Results Reviewed     Procedure Component Value Units Date/Time    Procalcitonin with AM Reflex [[id number]]  (Normal) Collected: 03/18/21 1456    Lab Status: Final result Specimen: Blood from Arm, Left Updated: 03/18/21 1933     Procalcitonin 0 22 ng/ml     UA w Reflex to Microscopic w Reflex to Culture [[id number]]  (Abnormal) Collected: 03/18/21 1849    Lab Status: Final result Specimen: Urine, Indwelling Haile Catheter Updated: 03/18/21 1910     Color, UA Yellow     Clarity, UA Cloudy     Specific [place], UA 1 020     pH, UA 5 0     Leukocytes, UA 3+     Nitrite, UA Negative     Protein, UA 2+ mg/dl      Glucose, UA Trace mg/dl      Ketones, UA Negative mg/dl      Urobilinogen, UA 0 2 E U /dl      Bilirubin, UA Negative     Blood, UA 1+    Lactic acid 2 Hours [[id number]] (Normal) Collected: 03/18/21 1659    Lab Status: Final result Specimen: Blood from Arm, Left Updated: 03/18/21 1718     LACTIC ACID 1 3 mmol/L     Narrative:      Result may be elevated if tourniquet was used during collection      APTT [[id number]]  (Normal) Collected: 03/18/21 1659    Lab Status: Final result Specimen: Blood from Arm, Left Updated: 03/18/21 1716     PTT 28 seconds     D-dimer, quantitative [[id number]]  (Abnormal) Collected: 03/18/21 1456    Lab Status: Final result Specimen: Blood from Arm, Left Updated: 03/18/21 1543     D-Dimer, Quant 4 51 ug/ml FEU     Troponin I [[id number]]  (Normal) Collected: 03/18/21 1456    Lab Status: Final result Specimen: Blood from Arm, Left Updated: 03/18/21 1528     Troponin I <0 03 ng/mL     Comprehensive metabolic panel [[id number]]  (Abnormal) Collected: 03/18/21 1456    Lab Status: Final result Specimen: Blood from Arm, Left Updated: 03/18/21 1527     Sodium 141 mmol/L      Potassium 4 5 mmol/L      Chloride 102 mmol/L      CO2 21 mmol/L      ANION GAP 18 mmol/L      BUN 55 mg/dL      Creatinine 5 56 mg/dL      Glucose 120 mg/dL      Calcium 8 8 mg/dL      AST 74 U/L      ALT 53 U/L      Alkaline Phosphatase 213 U/L      Total Protein 6 7 g/dL      Albumin 3 8 g/dL      Total Bilirubin 0 40 mg/dL      eGFR 9 ml/min/1 73sq m     Narrative:      [place]ima guidelines for Chronic Kidney Disease (CKD):     Stage 1 with normal or high GFR (GFR > 90 mL/min/1 73 square meters)    Stage 2 Mild CKD (GFR = 60-89 mL/min/1 73 square meters)    Stage 3A Moderate CKD (GFR = 45-59 mL/min/1 73 square meters)    Stage 3B Moderate CKD (GFR = 30-44 mL/min/1 73 square meters)    Stage 4 Severe CKD (GFR = 15-29 mL/min/1 73 square meters)    Stage 5 End Stage CKD (GFR <15 mL/min/1 73 square meters)  Note: GFR calculation is accurate only with a steady state creatinine    Magnesium [[id number]]  (Normal) Collected: 03/18/21 1456    Lab Status: Final result Specimen: Blood from Arm, Left Updated: 03/18/21 1526     Magnesium 2 4 mg/dL     Lactic acid [[id number]]  (Abnormal) Collected: 03/18/21 1456    Lab Status: Final result Specimen: Blood from Arm, Left Updated: 03/18/21 1526     LACTIC ACID 3 0 mmol/L     Narrative:      Result may be elevated if tourniquet was used during collection  Protime-INR [[id number]]  (Normal) Collected: 03/18/21 1456    Lab Status: Final result Specimen: Blood from Arm, Left Updated: 03/18/21 1520     Protime 13 6 seconds      INR 1 05    APTT [[id number]]  (Normal) Collected: 03/18/21 1456    Lab Status: Final result Specimen: Blood from Arm, Left Updated: 03/18/21 1520     PTT 28 seconds     CBC and differential [[id number]]  (Abnormal) Collected: 03/18/21 1456    Lab Status: Final result Specimen: Blood from Arm, Left Updated: 03/18/21 1519     WBC 9 80 Thousand/uL      RBC 3 18 Million/uL      Hemoglobin 9 7 g/dL      Hematocrit 29 4 %      MCV 93 fL      MCH 30 5 pg      MCHC 33 0 g/dL      RDW 15 8 %      MPV 8 9 fL      Platelets 809 Thousands/uL      Neutrophils Relative 72 %      Lymphocytes Relative 16 %      Monocytes Relative 9 %      Eosinophils Relative 1 %      Basophils Relative 1 %      Neutrophils Absolute 7 10 Thousands/µL      Lymphocytes Absolute 1 60 Thousands/µL      Monocytes Absolute 0 90 Thousand/µL      Eosinophils Absolute 0 10 Thousand/µL      Basophils Absolute 0 10 Thousands/µL     Blood culture #2 [[id number]] Collected: 03/18/21 1456    Lab Status: In process Specimen: Blood from Arm, Left Updated: 03/18/21 1503    Blood culture #1 [[id number]] Collected: 03/18/21 1456    Lab Status: In process Specimen: Blood from Arm, Left Updated: 03/18/21 1503                 XR chest portable   Final Result by Shi Evangelista MD (03/18 1643)      Persistent right lower lobe airspace consolidation most consistent with pneumonia                    Workstation performed: [id number]         NM inpatient order    (Results Pending)              Procedures  ECG 12 Lead Documentation Only    Date/Time: 3/18/2021 2:46 PM  Performed by: Adam Hernandes PA-C  Authorized by: Adam Hernandes PA-C     ECG reviewed by me, the ED Provider: yes    Patient location:  ED  Previous ECG:     Previous ECG:  Compared to current    Similarity:  No change    Comparison to cardiac monitor: Yes    Interpretation:     Interpretation: normal    Rate:     ECG rate:  75    ECG rate assessment: normal    Rhythm:     Rhythm: sinus rhythm    Ectopy:     Ectopy: none    QRS:     QRS axis:  Normal  Conduction:     Conduction: normal    ST segments:     ST segments:  Normal  T waves:     T waves: normal    Comments:      No evidence of acute cardiac ischemia             ED Course  ED Course as of Mar 18 1934   u Mar 18, 2021   1535 Result noted, IVF ordered   LACTIC ACID(!!): 3 0             HEART Risk Score      Most Recent Value   Heart Score Risk Calculator   History  0 Filed at: 03/18/2021 1627   ECG  1 Filed at: 03/18/2021 1627   Age  2 Filed at: 03/18/2021 1627   Risk Factors  2 Filed at: 03/18/2021 1627   Troponin  0 Filed at: 03/18/2021 1627   HEART Score  5 Filed at: 03/18/2021 1627                                    MDM  Number of Diagnoses or Management Options  Chest pain:   Renal insufficiency:   Shortness of breath:   Diagnosis management comments: Patient presented complaining of shortness of breath  Was hypoxic at infusion center  Patient presented saturating 100% on room air  Given cancer history and patient's chest pain, had concern for PE  Elevated D-dimer noted  Unable to give contrast bolus   Will start heparin and plan for V/Q scan as in patient  History of ESBL, urine potential source of lactic acidosis but more likely from underlying malignancy  Will continue to monitor as inpt  Patient agreeable to admission        Disposition  Final diagnoses:   Shortness of breath   Chest pain   Renal insufficiency     Time reflects when diagnosis was documented in both MDM as applicable and the Disposition within this note     Time User Action Codes Description Comment    3/18/2021  4:26 PM Codi Naidu Add [R06 02] Shortness of breath     3/18/2021  4:27 PM Codi Naidu Add [R07 9] Chest pain     3/18/2021  4:27 PM Codi Naidu Add [N28 9] Renal insufficiency       ED Disposition     ED Disposition Condition Date/Time Comment    Admit Stable Thu Mar 18, 2021  4:26 PM Case was discussed with Nancy Maradiaga and the patient's admission status was agreed to be Admission Status: inpatient status to the service of Dr Marquez Higgins          Follow-up Information    None         Current Discharge Medication List      CONTINUE these medications which have NOT CHANGED    Details   acetaminophen (TYLENOL) 325 mg tablet Take 650 mg by mouth every 6 (six) hours as needed       ascorbic acid (VITAMIN C) 1000 MG tablet Take 1 tablet (1,000 mg total) by mouth every 12 (twelve) hours for 6 doses  Qty: 6 tablet, Refills: 0    Associated Diagnoses: COVID-19      aspirin 81 MG tablet Take 81 mg by mouth daily      B Complex-C-Folic Acid (triphrocaps) 1 MG CAPS Take 1 capsule (1 mg total) by mouth daily with dinner  Qty: 90 capsule, Refills: 3    Associated Diagnoses: Acute kidney injury superimposed on chronic kidney disease (HCC)      cholecalciferol (VITAMIN D3) 1,000 units tablet Take 1,000 Units by mouth daily Take 2      insulin detemir (LEVEMIR) 100 units/mL subcutaneous injection Inject 5 Units under the skin daily at bedtime  Qty: 10 mL, Refills: 0    Associated Diagnoses: Type 2 diabetes mellitus with hyperglycemia, with long-term current use of insulin ([facility])      insulin lispro (HumaLOG) 100 units/mL injection Inject 4 Units under the skin 3 (three) times a day with meals  Qty: 10 mL, Refills: 5    Associated Diagnoses: Type 2 diabetes mellitus with hyperglycemia, with long-term current use of insulin ([facility])      loperamide (IMODIUM) 2 mg capsule Take 1 capsule (2 mg total) by mouth 3 (three) times a day as needed for diarrhea  Qty: 30 capsule, Refills: 0    Associated Diagnoses: Clostridium difficile diarrhea      magnesium oxide (MAG-OX) 400 mg Take 0 5 tablets (200 mg total) by mouth 2 (two) times a day    Associated Diagnoses: Acute hypokalemia      metoprolol tartrate (LOPRESSOR) 25 mg tablet Take 0 5 tablets (12 5 mg total) by mouth every 12 (twelve) hours  Qty: 60 tablet, Refills: 0    Associated Diagnoses: Atrial fibrillation, unspecified type ([facility])      NIFEdipine (PROCARDIA XL) 60 mg 24 hr tablet Take 1 tablet (60 mg total) by mouth daily  Qty: 90 tablet, Refills: 1    Associated Diagnoses: Essential hypertension      pantoprazole (PROTONIX) 40 mg tablet Take 1 tablet (40 mg total) by mouth daily  Qty: 90 tablet, Refills: 1    Associated Diagnoses: Nausea and vomiting      sodium bicarbonate 650 mg tablet Take 1 tablet (650 mg total) by mouth 2 (two) times a day  Qty: 90 tablet, Refills: 1    Associated Diagnoses: Stage 5 chronic kidney disease not on chronic dialysis ([facility] [address] ); Acidosis; Diarrhea, unspecified type      tamsulosin (FLOMAX) 0 4 mg Take 1 capsule (0 4 mg total) by mouth daily with dinner  Qty: 90 capsule, Refills: 3    Associated Diagnoses: Hyperplasia of prostate      vitamin B-12 (VITAMIN B-12) 1,000 mcg tablet Take 100 mcg by mouth daily       zinc sulfate (ZINCATE) 220 mg capsule Take 1 capsule (220 mg total) by mouth daily for 3 doses  Qty: 3 capsule, Refills: 0    Associated Diagnoses: COVID-19           No discharge procedures on file      PDMP Review       Value Time User    PDMP Reviewed  Yes 3/7/2020 12:10 PM Karen Marie, [address]          ED Provider  Electronically Signed by           Adam Hernandes PA-C  03/18/21 1934

## 2021-03-18 NOTE — RESPIRATORY THERAPY NOTE
RT Protocol Note  Amy Hunter 79 y o  male MRN: [id number]  Unit/Bed#: -01 Encounter: [id number]    Assessment    Active Problems:    * No active hospital problems  *      Home Pulmonary Medications:  None  Home Devices/Therapy: (P) Other (Comment)(None)    Past Medical History:   Diagnosis Date    Acute pancreatitis without infection or necrosis 9/26/2019    Age-related nuclear cataract, right 11/5/2020    Anemia of chronic renal failure     BPH (benign prostatic hyperplasia)     CKD (chronic kidney disease) stage 5, GFR less than 15 ml/min (HCC)     Coronary artery disease     COVID-19     DJD (degenerative joint disease)     Essential hypertension     Gait disturbance     Generalized weakness     GERD (gastroesophageal reflux disease)     Gout     History of transfusion     Hydronephrosis     Hyperlipidemia     Hypertension     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)     Pressure injury of skin     Proteinuria     Sepsis ([facility] [address] ) 3/11/2020    Thrombophlebitis migrans     Type 2 diabetes mellitus      Social History     Socioeconomic History    Marital status:       Spouse name: None    Number of children: None    Years of education: None    Highest education level: None   Occupational History    Occupation: Retired   Social Needs    Financial resource strain: None    Food insecurity     Worry: None     Inability: None    Transportation needs     Medical: None     Non-medical: None   Tobacco Use    Smoking status: Never Smoker    Smokeless tobacco: Never Used   Substance and Sexual Activity    Alcohol use: Never     Alcohol/week: 0 0 standard drinks     Frequency: Never    Drug use: Never    Sexual activity: Not Currently   Lifestyle    Physical activity     Days per week: 0 days     Minutes per session: 0 min    Stress: None   Relationships    Social connections     Talks on phone: None     Gets together: None     Attends Taoism service: None Active member of club or organization: None     Attends meetings of clubs or organizations: None     Relationship status: None    Intimate partner violence     Fear of current or ex partner: None     Emotionally abused: None     Physically abused: None     Forced sexual activity: None   Other Topics Concern    None   Social History Narrative    None       Subjective         Objective    Physical Exam:   Assessment Type: (P) Assess only  General Appearance: (P) Alert, Awake  Respiratory Pattern: (P) Normal  Chest Assessment: (P) Chest expansion symmetrical  Bilateral Breath Sounds: (P) Clear, Diminished  Cough: (P) None  O2 Device: (P) RA    Vitals:  Blood pressure 132/62, pulse (P) 81, temperature 97 5 °F (36 4 °C), temperature source Temporal, resp  rate (P) 20, height 5' 8" (1 727 m), weight 72 3 kg (159 lb 6 3 oz), SpO2 (P) 94 %  Imaging and other studies: I have personally reviewed pertinent reports  O2 Device: (P) RA     Plan    Respiratory Plan: (P) No distress/Pulmonary history        Resp Comments: (P) Pt  assessed  Pt  is eating supper  Pt  stated that he is not SOB or having any Respiratory difficulty  Pt  does not wear O2, take any breathing medications or use BiPap/CPAP @ home  Pt  offered a PRN MDI or PRN UDN, but he did not feel that he needed to take anything for his breathing @ this time  Will continue to monitor and treat Pt , as ordered

## 2021-03-18 NOTE — ASSESSMENT & PLAN NOTE
· With multiple etiologies - iron deficiency, chronic kidney disease, malignancy  · Hematology-Oncology discussed blood transfusion and patient declined currently

## 2021-03-18 NOTE — PROGRESS NOTES
Lanreotide given in Left buttock  pt c/o SOB on arrival to infusion center  His Vital signs were stable pulse ox 97% on room air  Placed on oxygen at 2 L  for comfort, pulse ox then 100%  Transported pt to the ED via wheel chair with his wife  Pt taken to room 12, brief report given to [facility]  Ronit Rachel RN spoke to Ben jacobson in ED prior to transport to the ED

## 2021-03-18 NOTE — PROGRESS NOTES
Outpatient Care Management Note:  Received return call from [place]  They have sent a health monitor to the patient's home  This will monitor his VS etc until April 9th when they will decide whether or not to extend it  She was appreciative of the update

## 2021-03-18 NOTE — PLAN OF CARE
Problem: Potential for Falls  Goal: Patient will remain free of falls  Description: INTERVENTIONS:  - Assess patient frequently for physical needs  -  Identify cognitive and physical deficits and behaviors that affect risk of falls    -  [place] fall precautions as indicated by assessment   - Educate patient/family on patient safety including physical limitations  - Instruct patient to call for assistance with activity based on assessment  - Modify environment to reduce risk of injury  - Consider OT/PT consult to assist with strengthening/mobility  3/18/2021 1744 by Ramon Wood RN  Outcome: Progressing  3/18/2021 1744 by Ramon Wood RN  Outcome: Not Progressing  3/18/2021 1743 by Ramon Wood RN  Outcome: Progressing     Problem: PAIN - ADULT  Goal: Verbalizes/displays adequate comfort level or baseline comfort level  Description: Interventions:  - Encourage patient to monitor pain and request assistance  - Assess pain using appropriate pain scale  - Administer analgesics based on type and severity of pain and evaluate response  - Implement non-pharmacological measures as appropriate and evaluate response  - Consider cultural and social influences on pain and pain management  - Notify physician/advanced practitioner if interventions unsuccessful or patient reports new pain  3/18/2021 1744 by Ramon Wood RN  Outcome: Progressing  3/18/2021 [facility] by Ramon Wood RN  Outcome: Not Progressing     Problem: INFECTION - ADULT  Goal: Absence or prevention of progression during hospitalization  Description: INTERVENTIONS:  - Assess and monitor for signs and symptoms of infection  - Monitor lab/diagnostic results  - Monitor all insertion sites, i e  indwelling lines, tubes, and drains  - Monitor endotracheal if appropriate and nasal secretions for changes in amount and color  - [place] appropriate cooling/warming therapies per order  - Administer medications as ordered  - Instruct and encourage patient and family to use good hand hygiene technique  - Identify and instruct in appropriate isolation precautions for identified infection/condition  3/18/2021 1744 by Yesenia Caal, RN  Outcome: Progressing  3/18/2021 1744 by Yesenia Caal, RN  Outcome: Not Progressing

## 2021-03-18 NOTE — ASSESSMENT & PLAN NOTE
Lab Results   Component Value Date    EGFR 9 03/18/2021    EGFR 11 03/11/2021    EGFR 10 03/10/2021    CREATININE 5 56 (H) 03/18/2021    CREATININE 4 73 (H) 03/11/2021    CREATININE 4 88 (H) 03/10/2021   · Likely secondary to volume depletion with diarrhea  · IV fluids  · Nephrology consult

## 2021-03-19 NOTE — ASSESSMENT & PLAN NOTE
· Patient was seen by Hematology-Oncology today CT scans were reviewed with progression of his disease      · Hospice was discussed and patient reports that he would like to continue treatment  · [facility], can transition to hospice if patient decides in the future

## 2021-03-19 NOTE — ASSESSMENT & PLAN NOTE
· Patient with diarrhea felt likely secondary to progression of his neuroendocrine metastatic malignancy  · Continue Imodium 2mg TID

## 2021-03-19 NOTE — PHYSICAL THERAPY NOTE
Physical Therapy Evaluation     Patient's Name: Twin Jacob    Admitting Diagnosis  Shortness of breath [R06 02]  Chest pain [R07 9]  Renal insufficiency [N28 9]    Problem List  Patient Active Problem List   Diagnosis    Type 2 diabetes mellitus with hyperglycemia, with long-term current use of insulin ([place] [address] )    Essential hypertension    Mixed hyperlipidemia    Iron deficiency anemia secondary to inadequate dietary iron intake    Liver mass    Neuroendocrine tumor    Metastatic neuroendocrine tumor    Pressure injury of right heel, unstageable ([place] [address] )    Atherosclerosis of artery of extremity with ulceration ([facility])    Carcinoid syndrome ([facility])    Urinary catheter in place    Malignant neuroendocrine neoplasm ([place] [address] )    Abdominal pain    Lactic acidosis    UTI due to extended-spectrum beta lactamase (ESBL) producing Escherichia coli    Hypomagnesemia    Normocytic anemia    Goals of care, counseling/discussion    Hyperparathyroid bone disease ([place] [address] )    Suspected C diff related diarrhea    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    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    Low TSH level    Severe protein-calorie malnutrition ([facility])    Chronic Diastolic congestive heart failure    Premature atrial complexes    Bradycardia    Generalized weakness    Recurrent UTI    Gastroesophageal reflux disease without esophagitis    Ureterolithiasis    Obstructive uropathy    Gait difficulty    CAD (coronary artery disease)    Azotemia    Acidosis    Hypertensive kidney disease with stage 5 chronic kidney disease, not on chronic dialysis ([facility])    Edema    Chest pain    Chest pressure    Urinary retention    Elevated d-dimer    Esophageal dysphagia    Dizziness    Anemia due to chronic kidney disease    CKD (chronic kidney disease) stage 5, GFR less than 15 ml/min ([facility])    Vasovagal episode    Acute kidney injury superimposed on CKD ([place] [address] )    History of Clostridioides difficile infection    A-fib ([place] [address] )    Moderate protein-calorie malnutrition (HCC)    UTI (urinary tract infection) due to chronic indwelling catheter (HCC)    Anemia    Delirium    Embolism and thrombosis of arteries of the lower extremities (HCC)    Thrombocytopenia, unspecified (HCC)    Pleural effusion    Secondary hyperparathyroidism of renal origin ([facility] [address] )    Stage 5 chronic kidney disease not on chronic dialysis ([facility] [address] )    Hypoxia       Past Medical History  Past Medical History:   Diagnosis Date    Acute pancreatitis without infection or necrosis 9/26/2019    Age-related nuclear cataract, right 11/5/2020    Anemia of chronic renal failure     BPH (benign prostatic hyperplasia)     CKD (chronic kidney disease) stage 5, GFR less than 15 ml/min (HCC)     Coronary artery disease     COVID-19     DJD (degenerative joint disease)     Essential hypertension     Gait disturbance     Generalized weakness     GERD (gastroesophageal reflux disease)     Gout     History of transfusion     Hydronephrosis     Hyperlipidemia     Hypertension     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)     Pressure injury of skin     Proteinuria     Sepsis ([facility] [address] ) 3/11/2020    Syncope and collapse 1/27/2019    Thrombophlebitis migrans     Type 2 diabetes mellitus        Past Surgical History  Past Surgical History:   Procedure Laterality Date    CATARACT EXTRACTION Right 11/05/2020    CATARACT EXTRACTION W/ INTRAOCULAR LENS IMPLANT Right 11/5/2020    Procedure: EYE OD CATARACT EXTRACTION WITH IOL INSERTION;  Surgeon: Claudia Hughes MD;  Location: [address] [place] [address] OR;  Service: Ophthalmology    CHOLECYSTECTOMY     Lazarus Som N/A 2/7/2020    Procedure: CHOLECYSTECTOMY LAPAROSCOPIC;  Surgeon:  Cecilia Lui MD;  Location: [address] [place] [address] OR;  Service: General    CORONARY ANGIOPLASTY WITH STENT PLACEMENT      FL RETROGRADE PYELOGRAM  5/21/2020    IR BIOPSY LIVER MASS  1/24/2019    IR CHOLECYSTOSTOMY TUBE PLACEMENT  9/6/2019    IR NON-TUNNELED CENTRAL LINE PLACEMENT  1/28/2021    SC CYSTO/URETERO W/LITHOTRIPSY &INDWELL STENT INSRT Left 5/21/2020    Procedure: CYSTOSCOPY URETEROSCOPY WITH LITHOTRIPSY HOLMIUM LASER, RETROGRADE PYELOGRAM AND INSERTION STENT URETERAL;  Surgeon: Mono Mccracken MD;  Location: [facility];  Service: Urology    SC CYSTOURETHROSCOPY,URETER CATHETER Left 4/21/2020    Procedure: CYSTOSCOPY WITH INSERTION STENT URETERAL;  Surgeon: Kenzie Wallace MD;  [facility];  Service: Urology        03/19/21 0814   PT Last Visit   PT Visit Date 03/19/21   Note Type   Note type Evaluation   Pain Assessment   Pain Assessment Tool FLACC   Pain Rating: FLACC (Rest) - Face 0   Pain Rating: FLACC (Rest) - Legs 0   Pain Rating: FLACC (Rest) - Activity 0   Pain Rating: FLACC (Rest) - Cry 0   Pain Rating: FLACC (Rest) - Consolability 0   Score: FLACC (Rest) 0   Pain Rating: FLACC (Activity) - Face 1   Pain Rating: FLACC (Activity) - Legs 0   Pain Rating: FLACC (Activity) - Activity 0   Pain Rating: FLACC (Activity) - Cry 1   Pain Rating: FLACC (Activity) - Consolability 0   Score: FLACC (Activity) 2   Home Living   Type of Home House   Home Layout One level;Performs ADLs on one level; Able to live on main level with bedroom/bathroom; Access; Ramped entrance   [facility]  (raised via [facility])   [address] [facility] bars in shower; Shower chair;Commode   [address]   Additional Comments PLOF & home environment were obtained via chart review as patient demonstrated decreased engagement throughout assessment  Reiterated verbal and tactile cues were required throughout assessment  Prior Function   Level of [place] Needs assistance with IADLs; Needs assistance with ADLs and functional mobility   Lives With Spouse   Receives Help From Family   ADL Assistance Needs assistance   IADLs Needs assistance   Falls in the last 6 months   (+ history as per chart review)   Vocational Retired   Restrictions/Precautions   [place] Bearing Precautions Per Order No   Other Precautions Fall Risk;Pain;Cognitive; Bed Alarm;contact precautions;garcia catheter   General   Family/Caregiver Present No   Cognition   Overall Cognitive Status Impaired   Arousal/Participation Lethargic   Orientation Level Unable to assess  (pt  could not provide  or last name )   Memory Decreased short term memory;Decreased recall of recent events;Decreased recall of precautions   Following Commands Follows one step commands with increased time or repetition   Comments Pt  nodded in agreement to PT assessment  RUE Assessment   RUE Assessment X  (Please see OT assessment for additional information )   LUE Assessment   LUE Assessment WFL   RLE Assessment   RLE Assessment X  (3/5 gross musculature)   LLE Assessment   LLE Assessment X  (3/5 gross musculature)   Coordination   Movements are Fluid and Coordinated 0   Bed Mobility   Supine to Sit 3  Moderate assistance   Additional items Assist x 2;HOB elevated; Bedrails; Increased time required;Verbal cues;LE management   Sit to Supine 4  Minimal assistance   Additional items Assist x 2;Bedrails; Increased time required;Verbal cues;LE management   Transfers   Sit to Stand 3  Moderate assistance   Additional items Assist x 2;Bedrails; Increased time required;Verbal cues   Stand to Sit 3  Moderate assistance   Additional items Assist x 2;Bedrails; Increased time required;Verbal cues   Stand pivot Unable to assess  (as static standing could not be maintained > 15 seconds)   Ambulation/Elevation   Gait pattern Not appropriate; Not tested   Balance   Static Sitting Fair -   Dynamic Sitting Poor +   Static Standing Poor +   Dynamic Standing   (unable to assess at this time)   Endurance Deficit   Endurance Deficit Yes   Activity Tolerance   Activity Tolerance Patient limited by fatigue; Other (Comment)  (decreased initiation & engagement)   Medical Staff Made Aware yes, Frida HATFIELD & Prem Love    Nurse Made Aware Yes, nursing staff was informed of assessment outcome  Assessment   Prognosis Poor   Problem List Decreased strength;Decreased endurance; Impaired balance;Decreased mobility; Decreased coordination;Decreased cognition; Impaired judgement;Decreased safety awareness   Assessment Pt is 79 y o  male seen for PT evaluation s/p admit to [address] [facility] [address] on 3/18/2021 w/ Hypoxia  PT consulted to assess pt's functional mobility and d/c needs  Order placed for PT eval and tx, w/ up and OOB as tolerated order  Comorbidities affecting pt's physical performance at time of assessment include: weakness,hypoxia, SILVERIO on CKD, carcinoid syndrome, HTN, GERD, lactic acidosis, malignant neuroendocrine neoplasm,DM   PTA, pt was required assistance for mobility  Personal factors affecting pt at time of IE include: communication issues, inability to ambulate household distances, inability to navigate community distances, unable to perform dynamic tasks in community, decreased cognition, positive fall history, decreased initiation and engagement, unable to perform physical activity and limited insight into impairments  Please find objective findings from PT assessment regarding body systems outlined above with impairments and limitations including weakness, impaired balance, decreased endurance, impaired coordination, decreased activity tolerance, decreased functional mobility tolerance, decreased safety awareness, impaired judgement, fall risk and decreased cognition  The following objective measures performed on IE also reveal limitations: AM-PAC 6-Clicks: 35/92  Pt's clinical presentation is currently unstable/unpredictable seen in pt's presentation of weakness, abnormal lab values, progressive symptoms prior to hospitalization, decreased engagement   Pt to benefit from continued PT tx to address deficits as defined above and maximize level of functional independent mobility and consistency  From PT/mobility standpoint, recommendation at time of d/c would be PAR pending progress in order to facilitate return to PLOF  Goals   Patient Goals unknown as pt  could not provide re:cognitive status   LTG Expiration Date 03/29/21   Long Term Goal #1 1 )Patient will complete bed mobility with CGA of 1 for decrease need for caregiver assistance, decrease burden of care  2 ) Patient will complete transfers with min A of 1 to decrease risk of falls, facilitate upright standing posture  3 ) BLE strength to greater than/equal to 3+/5 gross musculature to increase ability to safely transfer, control descent to chair  4 ) Patient will exhibit increase static standing balance to Fair > 45 seconds without LOB and min A of 1 to improve activity tolerance  PT Treatment Day 0   Plan   Treatment/Interventions ADL retraining;Functional transfer training;LE strengthening/ROM; Elevations; Therapeutic exercise; Endurance training;Cognitive reorientation;Patient/family training;Equipment eval/education; Bed mobility;Spoke to nursing;Spoke to case management;OT   PT Frequency Monitor status   Recommendation   PT Discharge Recommendation Post-Acute Rehabilitation Services   PT - OK to Discharge No   Additional Comments Upon conclusion, pt  was resting in bed w/bed alarm engaged and all needs within reach     AM-PAC Basic Mobility Inpatient   Turning in Bed Without Bedrails 3   Lying on Back to Sitting on Edge of Flat Bed 2   Moving Bed to Chair 2   Standing Up From Chair 1   Walk in Room 1   Climb 3-5 Stairs 1   Basic Mobility Inpatient Raw Score 10   Turning Head Towards Sound 2   Follow Simple Instructions 2   Low Function Basic Mobility Raw Score 14   Low Function Basic Mobility Standardized Score 22 01     Franco Colin, PT

## 2021-03-19 NOTE — PLAN OF CARE
Problem: OCCUPATIONAL THERAPY ADULT  Goal: Performs self-care activities at highest level of function for planned discharge setting  See evaluation for individualized goals  Description: Treatment Interventions: ADL retraining, Functional transfer training, UE strengthening/ROM, Endurance training, Cognitive reorientation, Patient/family training, Activityengagement          See flowsheet documentation for full assessment, interventions and recommendations  Note: Limitation: Decreased ADL status, Decreased UE ROM, Decreased UE strength, Decreased Safe judgement during ADL, Decreased cognition, Decreased endurance, Decreased self-care trans, Decreased high-level ADLs  Prognosis: Good  Assessment: Patient is a 79 y o  male seen for OT evaluation at [address] [place] [address] on 3/18/2021  s/p Hypoxia  Comorbidities impacting functional performance include: DM2, HTN, carcinoid syndrome, lactic acidosis, CHR, GERD, urinary retention, CKD, SILVERIO, and anemia  Patient presents with active orders for OT eval and treat and up and OOB as tolerated   Performed at least 2 patient identifiers during session including name and wristband  Patient reports prior level of function as needs assistance  with ADL/IADLs, ambulatory with manual w/c, retired, and lives with wife in a 1 story house  with ramped entrance  Upon initial evaluation, patient requires mod assist for UB ADLs, max assist for LB ADLs, and mod assist x2 for transfers and static standing with RW  Based on functional eval, patient presents A&Ox0 with intact attention, but impaired safety awareness, and memory  Occupational performance is affected by the following deficits: endurance , muscular strength , muscle tone , range of motion , balance , memory , initiation , orientation , awareness , alertness  and (+) pain   Patient would benefit from OT services to maximize independence in self-care and transfers   Personal factors impacting performance include: (+) Hx of falls , decreased ADL independence  and decreased IADL independence  Occupational areas to address include:bathing/showering, toileting, dressing , eating , personal hygiene/grooming , bed mobility , functional mobility, safety awareness  and fall prevention   Recommending post-acute rehabilitation  upon D/C        OT Discharge Recommendation: Post-Acute Rehabilitation Services  OT - OK to Discharge: Yes(Once medically clear)  Louie Albarran OTS

## 2021-03-19 NOTE — ASSESSMENT & PLAN NOTE
Lab Results   Component Value Date    EGFR 9 03/19/2021    EGFR 9 03/18/2021    EGFR 11 03/11/2021    CREATININE 5 49 (H) 03/19/2021    CREATININE 5 56 (H) 03/18/2021    CREATININE 4 73 (H) 03/11/2021   · Likely secondary to volume depletion with diarrhea  · IV fluids  · Nephrology consult

## 2021-03-19 NOTE — ASSESSMENT & PLAN NOTE
· Currently on room air  · NM scan, low prob for PE, d/c heparin gtt  · Pro arnulfo neg, d/c IV abx  · Possibly reaction given recent infusion

## 2021-03-19 NOTE — PLAN OF CARE
Problem: PHYSICAL THERAPY ADULT  Goal: Performs mobility at highest level of function for planned discharge setting  See evaluation for individualized goals  Description: Treatment/Interventions: ADL retraining, Functional transfer training, LE strengthening/ROM, Elevations, Therapeutic exercise, Endurance training, Cognitive reorientation, Patient/family training, Equipment eval/education, Bed mobility, Spoke to nursing, Spoke to case management, OT          See flowsheet documentation for full assessment, interventions and recommendations  Note: Prognosis: Poor  Problem List: Decreased strength, Decreased endurance, Impaired balance, Decreased mobility, Decreased coordination, Decreased cognition, Impaired judgement, Decreased safety awareness  Assessment: Pt is 79 y o  male seen for PT evaluation s/p admit to [address] [place] [address] on 3/18/2021 w/ Hypoxia  PT consulted to assess pt's functional mobility and d/c needs  Order placed for PT eval and tx, w/ up and OOB as tolerated order  Comorbidities affecting pt's physical performance at time of assessment include: weakness,hypoxia, SILVERIO on CKD, carcinoid syndrome, HTN, GERD, lactic acidosis, malignant neuroendocrine neoplasm,DM   PTA, pt was required assistance for mobility  Personal factors affecting pt at time of IE include: communication issues, inability to ambulate household distances, inability to navigate community distances, unable to perform dynamic tasks in community, decreased cognition, positive fall history, decreased initiation and engagement, unable to perform physical activity and limited insight into impairments   Please find objective findings from PT assessment regarding body systems outlined above with impairments and limitations including weakness, impaired balance, decreased endurance, impaired coordination, decreased activity tolerance, decreased functional mobility tolerance, decreased safety awareness, impaired judgement, fall risk and decreased cognition  The following objective measures performed on IE also reveal limitations: AM-PAC 6-Clicks: 53/  Pt's clinical presentation is currently unstable/unpredictable seen in pt's presentation of weakness, abnormal lab values, progressive symptoms prior to hospitalization, decreased engagement  Pt to benefit from continued PT tx to address deficits as defined above and maximize level of functional independent mobility and consistency  From PT/mobility standpoint, recommendation at time of d/c would be PAR pending progress in order to facilitate return to PLOF  PT Discharge Recommendation: (S) Post-Acute Rehabilitation Services     PT - OK to Discharge: No    See flowsheet documentation for full assessment

## 2021-03-19 NOTE — CONSULTS
CONSULTATION-NEPHROLOGY   Radha Rich 79 y o  male MRN: [id number]  Unit/Bed#: -01 Encounter: [id number]        Assessment and Plan:    1  Acute Kidney Injury (POA) atop chronic kidney disease  · Presented with sCr 5 56 mg/dL -> 5 5 mg/dL today  SILVERIO versus progression of renal disease  Etiology possibly prerenal in setting of diarrhea but he states this is fairly well controlled since hospital stay  · Continue gentle volume expansion, hold anti-hypertensives for SBP < 130 mmHg, avoid nephrotoxins and continue to provide supportive care  2  Stage 5 Chronic Kidney Disease with baseline creatinine around 5 0 mg/dL  · Medically managed without dialysis  Verified does not want dialysis  Newest sCr may reflect progression of disease due to volume depleted state from carcinoid syndrome  Discussed hospice evaluation with patient today and he declined once again  3  Acid/Base  · Continue sodium bicarbonate tablets  4  Hypertension associated with CKD 5  · Hold anti-hypertensive for SBP < 130 mmHg given advanced renal disease and age  11  Metastatic neuroendocrine tumor with carcinoid syndrome  · S/p lancreotide injection  Is using imodium around the clock to control diarrhea  Increase volume expansion if diarrhea worsens  6  Mineral Bone Disease of CKD  · Monitored in the outpatient setting  7  Urinary retention with chronic indwelling urinary catheter   8  Multifactorial Anemia  · Management per hematology  Declined blood transfusion at office visit       HPI:    Radha Rich is a 79 y o  male with an active problem list significant for CKD 5 not on dialysis, malignant neuroendocrine cancer with carcinoid syndrome, multifactorial anemia, chronic indwelling urinary catheter, debility, well known to this service who presented to [address] [place] [address] emergency department from [facility] center after receiving lancreotide injection due to dyspnea and confusion   He was briefly hypoxic with saturation as low as 75% but briskly improved  Initial labs significant for lactic acidosis 3 0 mmol/L -> 1 3 mmol/L, elevated LFTs, sCr 5,56 mg/dL, elevated AG  He was started on heparin infusion, antibiotics and volume expansion  A VQ scan is planned  Renal was consulted for SILVERIO on CKD  Upon discussion with the patient, he relays HPI as above  He states he does not feel well but can not provide more detailed information  He appears comfortable on room air  He denies any recent bouts of diarrhea  From a renal perspective, follows with this group  Just had office visit with me last week for post hospital follow-up  Has CKD 5 medically managed without dialysis  Confirmed today does not want to pursue renal replacement therapy  Will continue to medically manage  Declined hospice evaluation upon examination  The medical record, including Care Everywhere and media tabs were reviewed  Reason for Consult: CKD 5 medical management only    Review of Systems:  A complete 10-point review of systems was performed  Aside from what was mentioned in the HPI, it is otherwise negative        Historical Information   Past Medical History:   Diagnosis Date    Acute pancreatitis without infection or necrosis 9/26/2019    Age-related nuclear cataract, right 11/5/2020    Anemia of chronic renal failure     BPH (benign prostatic hyperplasia)     CKD (chronic kidney disease) stage 5, GFR less than 15 ml/min (HCC)     Coronary artery disease     COVID-19     DJD (degenerative joint disease)     Essential hypertension     Gait disturbance     Generalized weakness     GERD (gastroesophageal reflux disease)     Gout     History of transfusion     Hydronephrosis     Hyperlipidemia     Hypertension     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)     Pressure injury of skin     Proteinuria     Sepsis ([facility]) 3/11/2020    Syncope and collapse 1/27/2019    Thrombophlebitis migrans     Type 2 diabetes mellitus      Past Surgical History: Procedure Laterality Date    CATARACT EXTRACTION Right 11/05/2020    CATARACT EXTRACTION W/ INTRAOCULAR LENS IMPLANT Right 11/5/2020    Procedure: EYE OD CATARACT EXTRACTION WITH IOL INSERTION;  Surgeon: Nallely Lerma MD;  Location: [facility] MAIN OR;  Service: Ophthalmology    CHOLECYSTECTOMY     [facility] N/A 2/7/2020    Procedure: [facility];  Surgeon:  Krystle Rivera MD;  Location: [facility] MAIN OR;  Service: General    CORONARY ANGIOPLASTY WITH STENT PLACEMENT      FL RETROGRADE PYELOGRAM  5/21/2020    IR BIOPSY LIVER MASS  1/24/2019    IR CHOLECYSTOSTOMY TUBE PLACEMENT  9/6/2019    IR NON-TUNNELED CENTRAL LINE PLACEMENT  1/28/2021    DE CYSTO/URETERO W/LITHOTRIPSY &INDWELL STENT INSRT Left 5/21/2020    Procedure: CYSTOSCOPY URETEROSCOPY WITH LITHOTRIPSY HOLMIUM LASER, RETROGRADE PYELOGRAM AND INSERTION STENT URETERAL;  Surgeon: Cristin Briones MD;  Location: [facility] MAIN OR;  Service: Urology    DE CYSTOURETHROSCOPY,URETER CATHETER Left 4/21/2020    Procedure: CYSTOSCOPY WITH INSERTION STENT URETERAL;  Surgeon: Corin Jacobs MD;  Location: [facility] Main [facility];  Service: Urology     Social History   Social History     Substance and Sexual Activity   Alcohol Use Never    Alcohol/week: 0 0 standard drinks    Frequency: Never     Social History     Substance and Sexual Activity   Drug Use Never     Social History     Tobacco Use   Smoking Status Never Smoker   Smokeless Tobacco Never Used       Family History:   Family History   Problem Relation Age of Onset    Cancer Mother     Hypertension Father     Cancer Brother     Cancer Maternal Grandmother     Cancer Paternal Aunt        Medications:  Pertinent medications were reviewed  Current Facility-Administered Medications   Medication Dose Route Frequency Provider Last Rate    acetaminophen  650 mg Oral Q6H PRN Neli Lujan MD      aspirin  81 mg Oral Daily Neli Lujan MD      b complex-vitamin C-folic acid  1 capsule Oral Daily With [address]9 Gerardo Dodson MD      cefTRIAXone  1,000 mg Intravenous Q24H Ami Teixeira PA-C      cholecalciferol  1,000 Units Oral Daily Edith Diez MD      vitamin B-12  100 mcg Oral Daily Edith Diez MD      docusate sodium  100 mg Oral BID Edith Diez MD      heparin (porcine)  3-30 Units/kg/hr (Order-Specific) Intravenous Titrated Corina Shaw PA-C 18 Units/kg/hr (03/19/21 0003)    insulin detemir  5 Units Subcutaneous HS Edith Diez MD      insulin lispro  1-5 Units Subcutaneous TID [facility] Edith iDez MD      insulin lispro  1-5 Units Subcutaneous HS Edith Diez MD      insulin lispro  4 Units Subcutaneous TID With Meals Edith Diez MD      metoprolol tartrate  12 5 mg Oral Q12H 2020 Jayson Brown MD      NIFEdipine  60 mg Oral Daily Edith Diez MD      ondansetron  4 mg Intravenous Q6H PRN Edith Diez MD      pantoprazole  40 mg Oral Daily Edith Diez MD      sodium bicarbonate  650 mg Oral BID Edith Diez MD      sodium chloride  50 mL/hr Intravenous Continuous Edith Diez MD 50 mL/hr (03/18/21 1850)    tamsulosin  0 4 mg Oral Daily With 1139 Gerardo Dodson MD           No Known Allergies      Vitals:   /76 (BP Location: Right arm)   Pulse 78   Temp 97 6 °F (36 4 °C) (Temporal)   Resp 18   Ht 5' 8" (1 727 m)   Wt 72 3 kg (159 lb 6 3 oz)   SpO2 99%   BMI 24 24 kg/m²   Body mass index is 24 24 kg/m²    SpO2: 99 %,   SpO2 Activity: At Rest,   O2 Device: None (Room air)      Intake/Output Summary (Last 24 hours) at 3/19/2021 0930  Last data filed at 3/19/2021 0900  Gross per 24 hour   Intake 1170 ml   Output --   Net 1170 ml     Invasive Devices     Peripheral Intravenous Line            Peripheral IV 03/09/21 Left Antecubital 9 days    Peripheral IV 03/18/21 Arm less than 1 day          Drain Urethral Catheter Double-lumen 16 Fr  9 days                Physical Exam:  General: conscious, cooperative, in no acute distress, chronically ill appearing   Eyes: conjunctivae pink, anicteric sclerae  ENT: lips and mucous membranes moist  Neck: supple, no JVD, no masses  Chest: clear breath sounds bilateral, no crackles, ronchus or wheezings, poor effort  CVS: distinct S1 & S2, normal rate, regular rhythm  Abdomen: soft, non-tender, non-distended, normoactive bowel sounds  Extremities: no edema of both legs  Skin: no rash  Neuro: awake, alert, oriented      Diagnostic Data:  Lab: I have personally reviewed pertinent lab results  ,   CBC:  Results from last 7 days   Lab Units 03/19/21  0454   WBC Thousand/uL 8 10   HEMOGLOBIN g/dL 8 9*   HEMATOCRIT % 26 3*   PLATELETS Thousands/uL 155      CMP:   Lab Results   Component Value Date    SODIUM 142 03/19/2021    K 4 0 03/19/2021     03/19/2021    CO2 23 03/19/2021    BUN 54 (H) 03/19/2021    CREATININE 5 49 (H) 03/19/2021    CALCIUM 8 2 (L) 03/19/2021    AST 34 03/19/2021    ALT 36 03/19/2021    ALKPHOS 172 (H) 03/19/2021    EGFR 9 03/19/2021   ,   PT/INR:   Lab Results   Component Value Date    INR 1 05 03/18/2021   ,   Magnesium: No components found for: MAG,  Phosphorous:   Lab Results   Component Value Date    PHOS 4 5 03/19/2021       Microbiology:  @LABRCNT,(urinecx:7)@        [facility], [facility]    Portions of the record may have been created with voice recognition software  Occasional wrong word or "sound a like" substitutions may have occurred due to the inherent limitations of voice recognition software  Read the chart carefully and recognize, using context, where substitutions have occurred

## 2021-03-19 NOTE — UTILIZATION REVIEW
Initial Clinical Review    Admission: Date/Time/Statement:   Admission Orders (From admission, onward)     Ordered        03/18/21 1630  Inpatient Admission  Once                   Orders Placed This Encounter   Procedures    Inpatient Admission     Standing Status:   Standing     Number of Occurrences:   1     Order Specific Question:   Level of Care     Answer:   Med Surg [16]     Order Specific Question:   Bed request comments     Answer:   tele     Order Specific Question:   Estimated length of stay     Answer:   More than 2 Midnights     Order Specific Question:   Certification     Answer:   I certify that inpatient services are medically necessary for this patient for a duration of greater than two midnights  See H&P and MD Progress Notes for additional information about the patient's course of treatment  ED Arrival Information     Expected Arrival Acuity Means of Arrival Escorted By Service Admission Type    - 3/18/2021 14:24 Urgent Wheelchair Spouse General Medicine Urgent    Arrival Complaint    shortness of breath        Chief Complaint   Patient presents with    Shortness of Breath     started 1/2 hour ago while at the infusion center for his chem shot, (liver cancer), pt stated it feel like when he had trouble breathing before     Assessment/Plan:   81 yom to ER from infusion center for chemo c/o SOB with O2 sat @ 75%, dull ache in chest without radiation  Hx BPH with urinary retention and chronic Garcia, CAD, hypertension, GERD, hyperlipidemia, diabetes mellitus type 2 on insulin, 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 on chemotherapy  Presents pale & SOB with indwelling chronic garcia cath  Admission work-up showing michelle, elevated LFT's & d-dimer, RLL consolidation c/w pneumonia on imaging  Admitted to inpatient status for hypoxia, started on Iv heparin gtt, scheduled for VQ scan to r/o PE, started on IVABT for pneumonia, follow procalcitonin & troponin       ED Triage Vitals Temperature Pulse Respirations Blood Pressure SpO2   03/18/21 1431 03/18/21 1426 03/18/21 1426 03/18/21 1426 03/18/21 1426   (!) 96 3 °F (35 7 °C) 75 17 118/55 100 %      Temp Source Heart Rate Source Patient Position - Orthostatic VS BP Location FiO2 (%)   03/18/21 1431 03/18/21 1426 03/18/21 1426 03/18/21 1426 --   Tympanic Monitor Sitting Left arm       Pain Score       03/18/21 1733       No Pain          Wt Readings from Last 1 Encounters:   03/18/21 72 3 kg (159 lb 6 3 oz)     Additional Vital Signs:   Date/Time  Temp  Pulse  Resp  BP  MAP (mmHg)  SpO2  O2 Device  Patient Position - Orthostatic VS   03/19/21 0926  --  --  --  --  --  --  None (Room air)  --   03/19/21 0715  97 6 °F (36 4 °C)  78  18  155/76  109  99 %  None (Room air)  Lying   03/18/21 2300  97 2 °F (36 2 °C)Abnormal   69  18  156/82  --  96 %  --  Lying   03/18/21 2120  --  82  16  --  --  92 %  None (Room air)  --   03/18/21 2106  --  81  --  156/68  --  --  --  Lying   03/18/21 1824  --  81  20  --  --  94 %  None (Room air)  --   03/18/21 1733  97 5 °F (36 4 °C)  86  20  132/62  87  --  --  Lying   03/18/21 1431  96 3 °F (35 7 °C)Abnormal   --  --  --  --  --  --  --   03/18/21 1426  --  75  17  118/55  --  100 %  None (Room air)  Sitting     Pertinent Labs/Diagnostic Test Results:   Results from last 7 days   Lab Units 03/19/21  0454 03/18/21  1456   WBC Thousand/uL 8 10 9 80   HEMOGLOBIN g/dL 8 9* 9 7*   HEMATOCRIT % 26 3* 29 4*   PLATELETS Thousands/uL 155 193   NEUTROS ABS Thousands/µL 5 00 7  10*     Results from last 7 days   Lab Units 03/19/21  0454 03/18/21  1456   SODIUM mmol/L 142 141   POTASSIUM mmol/L 4 0 4 5   CHLORIDE mmol/L 105 102   CO2 mmol/L 23 21   ANION GAP mmol/L 14* 18*   BUN mg/dL 54* 55*   CREATININE mg/dL 5 49* 5 56*   EGFR ml/min/1 73sq m 9 9   CALCIUM mg/dL 8 2* 8 8   MAGNESIUM mg/dL 2 1 2 4   PHOSPHORUS mg/dL 4 5  --      Results from last 7 days   Lab Units 03/19/21  0454 03/18/21  1456   AST U/L 34 74*   ALT U/L 36 53*   ALK PHOS U/L 172* 213*   TOTAL PROTEIN g/dL 5 9* 6 7   ALBUMIN g/dL 3 2* 3 8   TOTAL BILIRUBIN mg/dL 0 30 0 40     Results from last 7 days   Lab Units 03/19/21  1041 03/19/21  0557 03/19/21  0459 03/18/21  2323 03/18/21  2049 03/18/21  1835   POC GLUCOSE mg/dl 57* 85 56* 180* 443* 249*     Results from last 7 days   Lab Units 03/19/21  0454 03/18/21  1456   GLUCOSE RANDOM mg/dL 46* 120*     BETA-HYDROXYBUTYRATE   Date Value Ref Range Status   02/05/2019 0 11 0 02 - 0 27 mmol/L Final      Results from last 7 days   Lab Units 03/18/21  2331 03/18/21 2054 03/18/21 1848 03/18/21  1456   TROPONIN I ng/mL <0 03 <0 03 <0 03 <0 03     Results from last 7 days   Lab Units 03/18/21  1456   D-DIMER QUANTITATIVE ug/ml FEU 4 51*     Results from last 7 days   Lab Units 03/19/21  0454 03/18/21  2331 03/18/21  1659 03/18/21  1456   PROTIME seconds  --   --   --  13 6   INR   --   --   --  1 05   PTT seconds 115* 78* 28 28     Results from last 7 days   Lab Units 03/19/21  0454 03/18/21  1456   PROCALCITONIN ng/ml 0 15 0 22     Results from last 7 days   Lab Units 03/18/21 1659 03/18/21  1456   LACTIC ACID mmol/L 1 3 3 0*     Results from last 7 days   Lab Units 03/18/21 2056 03/18/21  1849   CLARITY UA   --  Cloudy*   COLOR UA   --  Yellow   SPEC GRAV UA   --  1 020   PH UA   --  5 0   GLUCOSE UA mg/dl  --  Trace*   KETONES UA mg/dl  --  Negative   BLOOD UA   --  1+*   PROTEIN UA mg/dl  --  2+*   NITRITE UA   --  Negative   BILIRUBIN UA   --  Negative   UROBILINOGEN UA E U /dl  --  0 2   LEUKOCYTES UA   --  3+*   WBC UA /hpf  --  20-30*   RBC UA /hpf  --  2-4   BACTERIA UA /hpf  --  Occasional   EPITHELIAL CELLS WET PREP /hpf  --  Occasional   SODIUM UR  58  --    CREATININE UR mg/dL 94 8  --      Results from last 7 days   Lab Units 03/18/21  1456   BLOOD CULTURE  Received in Microbiology Lab  Culture in Progress  Received in Microbiology Lab  Culture in Progress       3/18  Cxr=  Persistent right lower lobe airspace consolidation most consistent with pneumonia  Ekg=  Rhythm: sinus rhythm    Comments: No evidence of acute cardiac ischemia    3/19  VQ scan=  The probability for pulmonary embolus is low      ED Treatment:   Medication Administration from 03/18/2021 1424 to 03/18/2021 1731       Date/Time Order Dose Route Action     03/18/2021 1536 cefTRIAXone (ROCEPHIN) IVPB (premix in dextrose) 1,000 mg 50 mL 1,000 mg Intravenous New Bag     03/18/2021 1535 sodium chloride 0 9 % bolus 1,000 mL 1,000 mL Intravenous New Bag     03/18/2021 1709 heparin (porcine) 25,000 units in 0 45% NaCl 250 mL infusion (premix) 18 Units/kg/hr Intravenous New Bag        Past Medical History:   Diagnosis Date    Acute pancreatitis without infection or necrosis 9/26/2019    Age-related nuclear cataract, right 11/5/2020    Anemia of chronic renal failure     BPH (benign prostatic hyperplasia)     CKD (chronic kidney disease) stage 5, GFR less than 15 ml/min (HCC)     Coronary artery disease     COVID-19     DJD (degenerative joint disease)     Essential hypertension     Gait disturbance     Generalized weakness     GERD (gastroesophageal reflux disease)     Gout     History of transfusion     Hydronephrosis     Hyperlipidemia     Hypertension     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)     Pressure injury of skin     Proteinuria     Sepsis ([address] ) 3/11/2020    Syncope and collapse 1/27/2019    Thrombophlebitis migrans     Type 2 diabetes mellitus      Present on Admission:   Essential hypertension   Chronic Diastolic congestive heart failure   CKD (chronic kidney disease) stage 5, GFR less than 15 ml/min (HCC)   Gastroesophageal reflux disease without esophagitis   Hypoxia   Malignant neuroendocrine neoplasm (HCC)   Carcinoid syndrome (HCC)   Anemia   Acute kidney injury superimposed on CKD ([address] )   Urinary retention   Lactic acidosis    Admitting Diagnosis: Shortness of breath [R06 02]  Chest pain [R07 9]  Renal insufficiency [N28 9]  Age/Sex: 79 y o  male  Admission Orders:  Labs per IV heparin gtt protocol  Pt/ot eval & tx  scd  accuchecks with coverage scale  Consult renal    Scheduled Medications:  aspirin, 81 mg, Oral, Daily  b complex-vitamin C-folic acid, 1 capsule, Oral, Daily With Dinner  cefTRIAXone, 1,000 mg, Intravenous, Q24H  cholecalciferol, 1,000 Units, Oral, Daily  vitamin B-12, 100 mcg, Oral, Daily  docusate sodium, 100 mg, Oral, BID  insulin detemir, 5 Units, Subcutaneous, HS  insulin lispro, 1-5 Units, Subcutaneous, TID AC  insulin lispro, 1-5 Units, Subcutaneous, HS  insulin lispro, 4 Units, Subcutaneous, TID With Meals  metoprolol tartrate, 12 5 mg, Oral, Q12H [address]  [START ON 3/20/2021] NIFEdipine, 60 mg, Oral, Daily  pantoprazole, 40 mg, Oral, Daily  sodium bicarbonate, 650 mg, Oral, BID  tamsulosin, 0 4 mg, Oral, Daily With Dinner    Continuous IV Infusions:  heparin (porcine), 3-30 Units/kg/hr (Order-Specific), Intravenous, Titrated  sodium chloride, 50 mL/hr, Intravenous, Continuous    PRN Meds:  acetaminophen, 650 mg, Oral, Q6H PRN  ondansetron, 4 mg, Intravenous, Q6H PRN    Network Utilization Review Department  ATTENTION: Please call with any questions or concerns to [phone] and carefully listen to the prompts so that you are directed to the right person  All voicemails are confidential   Ezio Moore all requests for admission clinical reviews, approved or denied determinations and any other requests to dedicated fax number below belonging to the campus where the patient is receiving treatment   List of dedicated fax numbers for the Facilities:  [address] DENIALS (Administrative/Medical Necessity) [phone]   [address] (Maternity/NICU/Pediatrics) [phone]   [address] [place] [address] [place] [phone] [facility] (  Carole Heller "[address]) [address] [facility] 28 [phone]     [address] [phone]   [address] [phone]

## 2021-03-19 NOTE — UTILIZATION REVIEW
Notification of Inpatient Admission/Inpatient Authorization Request   This is a Notification of Inpatient Admission for [address]  Be advised that this patient was admitted to our facility under Inpatient Status  Contact Araceli Her at [phone] for additional admission information  [facility]  DEDICATED -[phone]  Patient Name:   Brianna Cuevas   YOB: 1940       [address]   [address]:   [address]  Tax ID: [id number]  NPI: [id number] Attending Provider/NPI:  Phone:  Address: Brandon Redmond   [phone]  Same as Laura Lucas [address] of Service Code: 24 Place of Service Name: [address] [place] [address]   Start Date: 3/18/21 1628 Discharge Date & Time: No discharge date for patient encounter  Type of Admission: Inpatient Status Discharge Disposition   (if discharged): Home with [address] [facility]   Patient Diagnoses: Shortness of breath [R06 02]  Chest pain [R07 9]  Renal insufficiency [N28 9]     Orders: Admission Orders (From admission, onward)     Ordered        03/18/21 1630  Inpatient Admission  Once                    Assigned Utilization Review Contact: [address] [place] [address] Utilization Review Department  Phone: [phone]; Fax [phone]  Email: [email or web address]   ATTENTION PAYERS: Please call the assigned Utilization  directly with any questions or concerns ALL voicemails in the department are confidential  Send all requests for admission clinical reviews, approved or denied determinations and any other requests to dedicated fax number belonging to the campus where the patient is receiving treatment

## 2021-03-19 NOTE — CASE MANAGEMENT
Evaluated the pt at the bedside  Pt was admitted to the hospital for SOB while at then infusion center  Explained the role  Of CM and the options of discharge planning with the pt  Pt is a 30 day re-admit, high readmission score  CM spoke with pt and SO Roxanna Palmer who came to the bedside to discuss discharge planning  Pt lives with his SO in a 1 story home; ramp entrance  Pt usesWC at baseline  Also has cane, commode, w/c shower chair, glucometer, bp cuff and pulse ox  Roxanna Palmer does the driving, cooking, laundry,cleaning, medications and helps with dressing and bathing  Pt PCP is Cecillia Simmonds  He uses [facility] for medications and denies any barriers to obtaining them  Pt has history of [facility]  PT is recommending [facility] FLETCHER has spoken to the pt a SO about possibly transitioning to hospice with [facility]  Will notify [facility] of same as this is what the SO is now agreeable to  Pt may need transport home  Patient/caregiver received discharge checklist   Content reviewed  Patient/caregiver encouraged to participate in discharge plan of care prior to discharge home  CM reviewed d/c planning process including the following: identifying help at home, patient preference for d/c planning needs, availability of treatment team to discuss questions or concerns patient and/or family may have regarding understanding medications and recognizing signs and symptoms once discharged  CM also encouraged patient to follow up with all recommended appointments after discharge  Patient advised of importance for patient and family to participate in managing patients medical well being

## 2021-03-19 NOTE — PROGRESS NOTES
[address]  Progress Note Ruddy Ashton 1940, 79 y o  male MRN: [id number]  Unit/Bed#: -01 Encounter: [id number]  Primary Care Provider: RIKA Stevenson   Date and time admitted to hospital: 3/18/2021  2:25 PM    Acute kidney injury superimposed on CKD [facility])  Assessment & Plan  Lab Results   Component Value Date    EGFR 9 03/19/2021    EGFR 9 03/18/2021    EGFR 11 03/11/2021    CREATININE 5 49 (H) 03/19/2021    CREATININE 5 56 (H) 03/18/2021    CREATININE 4 73 (H) 03/11/2021   · Likely secondary to volume depletion with diarrhea  · IV fluids  · Nephrology consult    Chronic Diastolic congestive heart failure  Assessment & Plan  · Continue home medications    Malignant neuroendocrine neoplasm [facility])  Assessment & Plan  · Patient was seen by Hematology-Oncology today CT scans were reviewed with progression of his disease      · Hospice was discussed and patient reports that he would like to continue treatment  · Has [facility], can transition to hospice if patient decides in the future    Carcinoid syndrome [facility])  Assessment & Plan  · Patient with diarrhea felt likely secondary to progression of his neuroendocrine metastatic malignancy  · Continue Imodium 2mg TID    Essential hypertension  Assessment & Plan  · Continue metoprolol and Procardia    Type 2 diabetes mellitus with hyperglycemia, with long-term current use of insulin [facility])  Assessment & Plan  Lab Results   Component Value Date    HGBA1C 6 8 (H) 09/21/2020       Recent Labs     03/18/21  2323 03/19/21  0459 03/19/21  0557 03/19/21  1041   POCGLU 180* 56* 85 57*       Blood Sugar Average: Last 72 hrs:  (P) 463 [id number]   · Recommend discontinuing insulin on discharge  · Hypoglycemic, monitor off insulin  · Accuechecks, SSI    * Hypoxia  Assessment & Plan  · Currently on room air  · NM scan, low prob for PE, d/c heparin gtt  · Pro arnulfo neg, d/c IV abx  · Possibly reaction given recent infusion        VTE Pharmacologic Prophylaxis:   Pharmacologic: Heparin  Mechanical VTE Prophylaxis in Place: Yes    Patient Centered Rounds: I have performed bedside rounds with nursing staff today  Discussions with Specialists or Other Care Team Provider: Nephrology    Education and Discussions with Family / Patient: Patient    Time Spent for Care: 30 minutes  More than 50% of total time spent on counseling and coordination of care as described above  Current Length of Stay: 1 day(s)    Current Patient Status: Inpatient   Certification Statement: The patient will continue to require additional inpatient hospital stay due to IV fluids, SILVERIO    Discharge Plan: Tmr if renal and diarrhea improves    Code Status: Level 3 - DNAR and DNI      Subjective:   Patient seen today at bedside  Has no complaints of SOB, CP and slept overnight  NO complaints at this time  Objective:     Vitals:   Temp (24hrs), Av 4 °F (36 3 °C), Min:97 2 °F (36 2 °C), Max:97 6 °F (36 4 °C)    Temp:  [97 2 °F (36 2 °C)-97 6 °F (36 4 °C)] 97 4 °F (36 3 °C)  HR:  [69-93] 93  Resp:  [16-20] 18  BP: (132-168)/(62-93) 168/93  SpO2:  [92 %-99 %] 97 %  Body mass index is 24 24 kg/m²  Input and Output Summary (last 24 hours): Intake/Output Summary (Last 24 hours) at 3/19/2021 1543  Last data filed at 3/19/2021 1430  Gross per 24 hour   Intake 1410 ml   Output 500 ml   Net 910 ml       Physical Exam:     Physical Exam  Vitals signs reviewed  Constitutional:       General: He is not in acute distress  Appearance: He is obese  He is ill-appearing  Comments: Frail, lethargic   HENT:      Head: Normocephalic and atraumatic  Eyes:      Conjunctiva/sclera: Conjunctivae normal    Neck:      Musculoskeletal: Normal range of motion  Cardiovascular:      Rate and Rhythm: Normal rate and regular rhythm  Heart sounds: Normal heart sounds  No murmur     Pulmonary:      Effort: Pulmonary effort is normal  No respiratory distress  Breath sounds: Normal breath sounds  Abdominal:      General: Bowel sounds are normal  There is no distension  Palpations: Abdomen is soft  Tenderness: There is no abdominal tenderness  Genitourinary:     Comments: Chronic Haile  Musculoskeletal: Normal range of motion  Right lower leg: No edema  Left lower leg: No edema  Skin:     General: Skin is warm and dry  Coloration: Skin is pale  Neurological:      Mental Status: He is alert  Mental status is at baseline  Additional Data:     Labs:    Results from last 7 days   Lab Units 03/19/21  0454   WBC Thousand/uL 8 10   HEMOGLOBIN g/dL 8 9*   HEMATOCRIT % 26 3*   PLATELETS Thousands/uL 155   NEUTROS PCT % 63   LYMPHS PCT % 24   MONOS PCT % 10   EOS PCT % 2     Results from last 7 days   Lab Units 03/19/21  0454   SODIUM mmol/L 142   POTASSIUM mmol/L 4 0   CHLORIDE mmol/L 105   CO2 mmol/L 23   BUN mg/dL 54*   CREATININE mg/dL 5 49*   ANION GAP mmol/L 14*   CALCIUM mg/dL 8 2*   ALBUMIN g/dL 3 2*   TOTAL BILIRUBIN mg/dL 0 30   ALK PHOS U/L 172*   ALT U/L 36   AST U/L 34   GLUCOSE RANDOM mg/dL 46*     Results from last 7 days   Lab Units 03/18/21  1456   INR  1 05     Results from last 7 days   Lab Units 03/19/21  1041 03/19/21  0557 03/19/21  0459 03/18/21  2323 03/18/21  2049 03/18/21  1835   POC GLUCOSE mg/dl 57* 85 56* 180* 443* 249*         Results from last 7 days   Lab Units 03/19/21  0454 03/18/21  1659 03/18/21  1456   LACTIC ACID mmol/L  --  1 3 3 0*   PROCALCITONIN ng/ml 0 15  --  0 22           * I Have Reviewed All Lab Data Listed Above  * Additional Pertinent Lab Tests Reviewed: All Labs For Current Hospital Admission Reviewed    Imaging:    Xr Chest Portable    Result Date: 3/18/2021  Impression: Persistent right lower lobe airspace consolidation most consistent with pneumonia  Workstation performed: [id number]     Nm Lung Perfusion Imaging (particulate)    Result Date: 3/19/2021  Impression:  The probability for pulmonary embolus is low  Workstation performed: [id number]       Recent Cultures (last 7 days):     Results from last 7 days   Lab Units 03/18/21  1456   BLOOD CULTURE  Received in Microbiology Lab  Culture in Progress  Received in Microbiology Lab  Culture in Progress  Last 24 Hours Medication List:   Current Facility-Administered Medications   Medication Dose Route Frequency Provider Last Rate    acetaminophen  650 mg Oral Q6H PRN Norma Flor MD      aspirin  81 mg Oral Daily Norma Flor MD      b complex-vitamin C-folic acid  1 capsule Oral Daily With 1139 Gerardo Dodsno MD      cholecalciferol  1,000 Units Oral Daily Norma Flor MD      vitamin B-12  100 mcg Oral Daily Norma Flor MD      docusate sodium  100 mg Oral BID Norma Flor MD      insulin lispro  1-5 Units Subcutaneous TID [facility] Norma Flor MD      insulin lispro  1-5 Units Subcutaneous HS Norma Flor MD      loperamide  2 mg Oral TID Anthony Padilla MD      metoprolol tartrate  12 5 mg Oral Q12H 2020 Auroraig Brown MD      [START ON 3/20/2021] NIFEdipine  60 mg Oral Daily RIKA Rothman      ondansetron  4 mg Intravenous Q6H PRN Norma Flor MD      pantoprazole  40 mg Oral Daily Norma Flor MD      sodium bicarbonate  650 mg Oral BID Norma Flor MD      sodium chloride  50 mL/hr Intravenous Continuous Anthony Padilla MD      tamsulosin  0 4 mg Oral Daily With 1139 Gerardo Dodson MD          Today, Patient Was Seen By: Anthony Padilla MD    ** Please Note: Dictation voice to text software may have been used in the creation of this document   **

## 2021-03-19 NOTE — PLAN OF CARE
Problem: Potential for Falls  Goal: Patient will remain free of falls  Description: INTERVENTIONS:  - Assess patient frequently for physical needs  -  Identify cognitive and physical deficits and behaviors that affect risk of falls    -  [place] fall precautions as indicated by assessment   - Educate patient/family on patient safety including physical limitations  - Instruct patient to call for assistance with activity based on assessment  - Modify environment to reduce risk of injury  - Consider OT/PT consult to assist with strengthening/mobility  Outcome: Progressing     Problem: Prexisting or High Potential for Compromised Skin Integrity  Goal: Skin integrity is maintained or improved  Description: INTERVENTIONS:  - Identify patients at risk for skin breakdown  - Assess and monitor skin integrity  - Assess and monitor nutrition and hydration status  - Monitor labs   - Assess for incontinence   - Turn and reposition patient  - Assist with mobility/ambulation  - Relieve pressure over bony prominences  - Avoid friction and shearing  - Provide appropriate hygiene as needed including keeping skin clean and dry  - Evaluate need for skin moisturizer/barrier cream  - Collaborate with interdisciplinary team   - Patient/family teaching  - Consider wound care consult   Outcome: Progressing     Problem: PAIN - ADULT  Goal: Verbalizes/displays adequate comfort level or baseline comfort level  Description: Interventions:  - Encourage patient to monitor pain and request assistance  - Assess pain using appropriate pain scale  - Administer analgesics based on type and severity of pain and evaluate response  - Implement non-pharmacological measures as appropriate and evaluate response  - Consider cultural and social influences on pain and pain management  - Notify physician/advanced practitioner if interventions unsuccessful or patient reports new pain  Outcome: Progressing     Problem: INFECTION - ADULT  Goal: Absence or prevention of progression during hospitalization  Description: INTERVENTIONS:  - Assess and monitor for signs and symptoms of infection  - Monitor lab/diagnostic results  - Monitor all insertion sites, i e  indwelling lines, tubes, and drains  - Monitor endotracheal if appropriate and nasal secretions for changes in amount and color  - [place] appropriate cooling/warming therapies per order  - Administer medications as ordered  - Instruct and encourage patient and family to use good hand hygiene technique  - Identify and instruct in appropriate isolation precautions for identified infection/condition  Outcome: Progressing     Problem: SAFETY ADULT  Goal: Patient will remain free of falls  Description: INTERVENTIONS:  - Assess patient frequently for physical needs  -  Identify cognitive and physical deficits and behaviors that affect risk of falls  -  [place] fall precautions as indicated by assessment   - Educate patient/family on patient safety including physical limitations  - Instruct patient to call for assistance with activity based on assessment  - Modify environment to reduce risk of injury  - Consider OT/PT consult to assist with strengthening/mobility  Outcome: Progressing  Goal: Maintain or return to baseline ADL function  Description: INTERVENTIONS:  - Assess patient frequently for physical needs  -  Identify cognitive and physical deficits and behaviors that affect risk of falls    -  [place] fall precautions as indicated by assessment   - Educate patient/family on patient safety including physical limitations  - Instruct patient to call for assistance with activity based on assessment  - Modify environment to reduce risk of injury  - Consider OT/PT consult to assist with strengthening/mobility  Outcome: Progressing  Goal: Maintain or return mobility status to optimal level  Description: INTERVENTIONS:  -  Assess patient's ability to carry out ADLs; assess patient's baseline for ADL function and identify physical deficits which impact ability to perform ADLs (bathing, care of mouth/teeth, toileting, grooming, dressing, etc )  - Assess/evaluate cause of self-care deficits   - Assess range of motion  - Assess patient's mobility; develop plan if impaired  - Assess patient's need for assistive devices and provide as appropriate  - Encourage maximum independence but intervene and supervise when necessary  - Involve family in performance of ADLs  - Assess for home care needs following discharge   - Consider OT consult to assist with ADL evaluation and planning for discharge  - Provide patient education as appropriate  Outcome: Progressing     Problem: DISCHARGE PLANNING  Goal: Discharge to home or other facility with appropriate resources  Description: INTERVENTIONS:  - Identify barriers to discharge w/patient and caregiver  - Arrange for needed discharge resources and transportation as appropriate  - Identify discharge learning needs (meds, wound care, etc )  - Arrange for interpretive services to assist at discharge as needed  - Refer to Case Management Department for coordinating discharge planning if the patient needs post-hospital services based on physician/advanced practitioner order or complex needs related to functional status, cognitive ability, or social support system  Outcome: Progressing     Problem: Knowledge Deficit  Goal: Patient/family/caregiver demonstrates understanding of disease process, treatment plan, medications, and discharge instructions  Description: Complete learning assessment and assess knowledge base    Interventions:  - Provide teaching at level of understanding  - Provide teaching via preferred learning methods  Outcome: Progressing     Problem: RESPIRATORY - ADULT  Goal: Achieves optimal ventilation and oxygenation  Description: INTERVENTIONS:  - Assess for changes in respiratory status  - Assess for changes in mentation and behavior  - Position to facilitate oxygenation and minimize respiratory effort  - Oxygen administered by appropriate delivery if ordered  - Initiate smoking cessation education as indicated  - Encourage broncho-pulmonary hygiene including cough, deep breathe, Incentive Spirometry  - Assess the need for suctioning and aspirate as needed  - Assess and instruct to report SOB or any respiratory difficulty  - Respiratory Therapy support as indicated  Outcome: Progressing     Problem: SKIN/TISSUE INTEGRITY - ADULT  Goal: Skin integrity remains intact  Description: INTERVENTIONS  - Identify patients at risk for skin breakdown  - Assess and monitor skin integrity  - Assess and monitor nutrition and hydration status  - Monitor labs (i e  albumin)  - Assess for incontinence   - Turn and reposition patient  - Assist with mobility/ambulation  - Relieve pressure over bony prominences  - Avoid friction and shearing  - Provide appropriate hygiene as needed including keeping skin clean and dry  - Evaluate need for skin moisturizer/barrier cream  - Collaborate with interdisciplinary team (i e  Nutrition, Rehabilitation, etc )   - Patient/family teaching  Outcome: Progressing     Problem: MUSCULOSKELETAL - ADULT  Goal: Maintain or return mobility to safest level of function  Description: INTERVENTIONS:  - Assess patient's ability to carry out ADLs; assess patient's baseline for ADL function and identify physical deficits which impact ability to perform ADLs (bathing, care of mouth/teeth, toileting, grooming, dressing, etc )  - Assess/evaluate cause of self-care deficits   - Assess range of motion  - Assess patient's mobility  - Assess patient's need for assistive devices and provide as appropriate  - Encourage maximum independence but intervene and supervise when necessary  - Involve family in performance of ADLs  - Assess for home care needs following discharge   - Consider OT consult to assist with ADL evaluation and planning for discharge  - Provide patient education as appropriate  Outcome: Progressing

## 2021-03-19 NOTE — H&P
[address] [facility]  H&P- Radha Roles 1940, 79 y o  male MRN: [id number]  Unit/Bed#: -01 Encounter: [id number]  Primary Care Provider: RIKA Fall   Date and time admitted to hospital: 3/18/2021  2:25 PM    * Hypoxia  Assessment & Plan  · Brief episode of hypoxia and respiratory distress while receiving infusion - Patient noted to be 75% at infusion center, he was brought to the ER and nasal cannula in place however oxygen not turned on and improved to 100% room air    Currently he is % on room air on his hospital vitals  · Possibly stress/anxiety related after discussion with Oncology regarding progression of his disease and discussed hospice - patient wants to pursue treatment per Hematology no  · Rule out PE with V/Q scan -currently on a heparin drip  · Patient was given IV antibiotics for concern of pneumonia - initial procalcitonin negative, follow-up a m  procalcitonin - if this is negative discontinue antibiotics, his WBCs are normal, afebrile  · Rule out acute coronary syndrome with monitoring troponins and telemetry    Acute kidney injury superimposed on CKD [facility])  Assessment & Plan  Lab Results   Component Value Date    EGFR 9 03/18/2021    EGFR 11 03/11/2021    EGFR 10 03/10/2021    CREATININE 5 56 (H) 03/18/2021    CREATININE 4 73 (H) 03/11/2021    CREATININE 4 88 (H) 03/10/2021   · Likely secondary to volume depletion with diarrhea  · IV fluids  · Nephrology consult    Essential hypertension  Assessment & Plan  · Continue metoprolol and Procardia  · Monitor blood pressure    Lactic acidosis  Assessment & Plan  · Likely w/ diarrhea 2/2 malignancy  · IVF  · Monitor in AM  · Not sepsis    Anemia  Assessment & Plan  · With multiple etiologies - iron deficiency, chronic kidney disease, malignancy  · Hematology-Oncology discussed blood transfusion and patient declined currently    CKD (chronic kidney disease) stage 5, GFR less than 15 ml/min [facility])  Assessment & Plan  Lab Results   Component Value Date    EGFR 9 03/18/2021    EGFR 11 03/11/2021    EGFR 10 03/10/2021    CREATININE 5 56 (H) 03/18/2021    CREATININE 4 73 (H) 03/11/2021    CREATININE 4 88 (H) 03/10/2021   · With acute kidney injury  · Creatinine baseline approximately 4 5  · Likely pre renal related  · Nephrology consult  · IV fluids    Urinary retention  Assessment & Plan  · W/ BPH and chronic garcia  ·     Gastroesophageal reflux disease without esophagitis  Assessment & Plan  · Continue PPI    Chronic Diastolic congestive heart failure  Assessment & Plan  · Continue home medications caution with IV fluids    Malignant neuroendocrine neoplasm [facility])  Assessment & Plan  · Patient was seen by Hematology-Oncology today CT scans were reviewed with progression of his disease  Hospice was discussed and patient reports that he would like to continue treatment  Carcinoid syndrome ([facility] [address] )  Assessment & Plan  · Patient with diarrhea felt likely secondary to progression of his neuroendocrine metastatic malignancy  · Continue Imodium    Type 2 diabetes mellitus with hyperglycemia, with long-term current use of insulin ([facility])  Assessment & Plan  Lab Results   Component Value Date    HGBA1C 6 8 (H) 09/21/2020       Recent Labs     03/18/21  1835   POCGLU 249*       Blood Sugar Average: Last 72 hrs:  (P) 249   · Continue Levemir and Accu-Cheks    VTE Prophylaxis: Heparin Drip  Code Status: DNR/DNI after discussed CPR  Discussion with patient    Anticipated Length of Stay:  Patient will be admitted on an Inpatient basis with an anticipated length of stay of  > 2 midnights  Justification for Hospital Stay: Testing, specialist input    Chief Complaint:   Shortness of breath    History of Present Illness:    Madeleine Amaya is a 79 y o  male who presents with shortness of breath    Patient with past medical history of BPH with urinary retention and chronic Garcia, CAD, hypertension, GERD, hyperlipidemia, diabetes mellitus type 2 on insulin, 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 on chemotherapy was at his oncologist today and went for his infusion and while at getting his chemotherapy treatment he noted shortness breath  Pulse ox was checked and was reported at 75% and respiratory distress noted placed patient on oxygen and brought him to the emergency room  He also reported some dull ache in his chest without radiation  He had recheck of sats and was 100% room air  The nasal cannula was in place and the oxygen was not turned hooked up per ER note  Patient says "they sent me from downstairs to the ER to check in" He did agree to shortness of breath when asked  Reports he feels good now  Patient reports he is not interested in speaking w/ hospice liason in the hospital  I reviewed details of patients record regarding the suspected cause of his diarrhea from the progression of his neuroendocrine tumor and he said he never heard that  ? Need of neuropsych evaluation    Review of Systems:    Review of Systems   Constitutional: Negative for chills and fever  HENT: Negative for rhinorrhea, sore throat and trouble swallowing  Eyes: Negative for discharge and redness  Respiratory: Positive for shortness of breath  Negative for cough  Cardiovascular: Positive for chest pain  Negative for leg swelling  Gastrointestinal: Negative for abdominal pain, diarrhea, nausea and vomiting  Genitourinary: Negative for hematuria  Chronic garcia   Musculoskeletal: Negative for back pain and neck pain  Skin: Positive for pallor  Negative for rash  Neurological: Negative for dizziness and headaches  Psychiatric/Behavioral: Negative for agitation         Past Medical and Surgical History:     Past Medical History:   Diagnosis Date    Acute pancreatitis without infection or necrosis 9/26/2019    Age-related nuclear cataract, right 11/5/2020    Anemia of chronic renal failure     BPH (benign prostatic hyperplasia)     CKD (chronic kidney disease) stage 5, GFR less than 15 ml/min (HCC)     Coronary artery disease     COVID-19     DJD (degenerative joint disease)     Essential hypertension     Gait disturbance     Generalized weakness     GERD (gastroesophageal reflux disease)     Gout     History of transfusion     Hydronephrosis     Hyperlipidemia     Hypertension     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)     Pressure injury of skin     Proteinuria     Sepsis ([facility] [address] ) 3/11/2020    Syncope and collapse 1/27/2019    Thrombophlebitis migrans     Type 2 diabetes mellitus        Past Surgical History:   Procedure Laterality Date    CATARACT EXTRACTION Right 11/05/2020    CATARACT EXTRACTION W/ INTRAOCULAR LENS IMPLANT Right 11/5/2020    Procedure: EYE OD CATARACT EXTRACTION WITH IOL INSERTION;  Surgeon: Analisa Palomino MD;  Location: [address] MAIN OR;  Service: Ophthalmology    CHOLECYSTECTOMY     Aretha Dorantes N/A 2/7/2020    Procedure: CHOLECYSTECTOMY LAPAROSCOPIC;  Surgeon: Kirt Burns MD;  [facility]: [facility] MAIN OR;  Service: General    CORONARY ANGIOPLASTY WITH STENT PLACEMENT      FL RETROGRADE PYELOGRAM  5/21/2020    IR BIOPSY LIVER MASS  1/24/2019    IR CHOLECYSTOSTOMY TUBE PLACEMENT  9/6/2019    IR NON-TUNNELED CENTRAL LINE PLACEMENT  1/28/2021    ND CYSTO/URETERO W/LITHOTRIPSY &INDWELL STENT INSRT Left 5/21/2020    Procedure: CYSTOSCOPY URETEROSCOPY WITH LITHOTRIPSY HOLMIUM LASER, RETROGRADE PYELOGRAM AND INSERTION STENT URETERAL;  Surgeon: Chance Jimenez MD;  Location: [facility] MAIN OR;  Service: Urology    ND CYSTOURETHROSCOPY,URETER CATHETER Left 4/21/2020    Procedure: CYSTOSCOPY WITH INSERTION STENT URETERAL;  Surgeon: Ada Hudson MD;  Location: [facility] Main [facility];  Service: Urology       Meds/Allergies:    Prior to Admission medications    Medication Sig Start Date End Date Taking?  Authorizing Provider   acetaminophen (TYLENOL) 325 mg tablet Take 650 mg by mouth every 6 (six) hours as needed     Historical Provider, MD   ascorbic acid (VITAMIN C) 1000 MG tablet Take 1 tablet (1,000 mg total) by mouth every 12 (twelve) hours for 6 doses 1/28/21   Boo Mark MD   aspirin 81 MG tablet Take 81 mg by mouth daily    Historical Provider, MD   B Complex-C-Folic Acid (triphrocaps) 1 MG CAPS Take 1 capsule (1 mg total) by mouth daily with dinner 2/4/21   Jaylen Aceves DO   cholecalciferol (VITAMIN D3) 1,000 units tablet Take 1,000 Units by mouth daily Take 2    Historical Provider, MD   insulin detemir (LEVEMIR) 100 units/mL subcutaneous injection Inject 5 Units under the skin daily at bedtime 3/17/21   RIKA Mendez   insulin lispro (HumaLOG) 100 units/mL injection Inject 4 Units under the skin 3 (three) times a day with meals 3/17/21   RIKA Mendez   loperamide (IMODIUM) 2 mg capsule Take 1 capsule (2 mg total) by mouth 3 (three) times a day as needed for diarrhea 3/15/21   RIKA Merritt   magnesium oxide (MAG-OX) 400 mg Take 0 5 tablets (200 mg total) by mouth 2 (two) times a day  Patient taking differently: Take 200 mg by mouth daily  1/20/21 3/17/21  Jaylen Aceves DO   metoprolol tartrate (LOPRESSOR) 25 mg tablet Take 0 5 tablets (12 5 mg total) by mouth every 12 (twelve) hours 3/11/21   Nickolas Guzman MD   NIFEdipine (PROCARDIA XL) 60 mg 24 hr tablet Take 1 tablet (60 mg total) by mouth daily 3/17/21   RIKA Mendez   pantoprazole (PROTONIX) 40 mg tablet Take 1 tablet (40 mg total) by mouth daily  Patient not taking: Reported on 3/17/2021 11/10/20   RIKA Merritt   sodium bicarbonate 650 mg tablet Take 1 tablet (650 mg total) by mouth 2 (two) times a day 3/15/21   RIKA Merritt   tamsulosin [facility]) 0 4 mg Take 1 capsule (0 4 mg total) by mouth daily with dinner 2/4/21   Jaylen Aceves DO   vitamin B-12 (VITAMIN B-12) 1,000 mcg tablet Take 100 mcg by mouth daily     Historical Provider, MD   zinc sulfate (ZINCATE) 220 mg capsule Take 1 capsule (220 mg total) by mouth daily for 3 doses 1/29/21 3/15/21  Cory Akhtar MD     all medications and allergies reviewed    Allergies: No Known Allergies    Social History:     Marital Status:    Occupation: retired  Patient Pre-hospital Living Situation:  Home  Patient Pre-hospital Level of Mobility: wheelchair  Patient Pre-hospital Diet Restrictions: none  Substance Use History:   Social History     Substance and Sexual Activity   Alcohol Use Never    Alcohol/week: 0 0 standard drinks    Frequency: Never     Social History     Tobacco Use   Smoking Status Never Smoker   Smokeless Tobacco Never Used     Social History     Substance and Sexual Activity   Drug Use Never       Family History:  I have reviewed the patient's family history    Physical Exam:     Vitals:   Blood Pressure: 132/62 (03/18/21 1733)  Pulse: 81 (03/18/21 1824)  Temperature: 97 5 °F (36 4 °C) (03/18/21 1733)  Temp Source: Temporal (03/18/21 1733)  Respirations: 20 (03/18/21 1824)  Height: 5' 8" (172 7 cm) (03/18/21 1733)  Weight - Scale: 72 3 kg (159 lb 6 3 oz) (03/18/21 1733)  SpO2: 94 % (03/18/21 1824)    Physical Exam  Vitals signs reviewed  Constitutional:       General: He is not in acute distress  Appearance: Normal appearance  Comments: Frail elderly  male   HENT:      Head: Normocephalic and atraumatic  Right Ear: External ear normal       Left Ear: External ear normal       Nose: Nose normal    Eyes:      General:         Right eye: No discharge  Left eye: No discharge  Conjunctiva/sclera: Conjunctivae normal       Comments: Suspected cataract changes of the left eye and cataract surgery in the right eye   Neck:      Musculoskeletal: Normal range of motion  Cardiovascular:      Rate and Rhythm: Normal rate and regular rhythm  Heart sounds: Normal heart sounds  No murmur     Pulmonary:      Effort: Pulmonary effort is normal  No respiratory distress  Breath sounds: Normal breath sounds  No wheezing or rales  Comments: Patient currently on room air satting 94% on most recent vitals  He is able to speak in full sentences without difficulty  Abdominal:      General: Bowel sounds are normal  There is no distension  Palpations: Abdomen is soft  Tenderness: There is no abdominal tenderness  There is no guarding  Genitourinary:     Comments: Chronic Haile  Musculoskeletal: Normal range of motion  Right lower leg: No edema  Left lower leg: No edema  Skin:     General: Skin is warm and dry  Coloration: Skin is pale  Neurological:      Mental Status: He is alert  Mental status is at baseline  Comments: Speech intact, moving extremities   Psychiatric:         Mood and Affect: Mood normal          Behavior: Behavior normal          Additional Data:     Lab Results: I have personally reviewed pertinent reports  Results from last 7 days   Lab Units 03/18/21  1456   WBC Thousand/uL 9 80   HEMOGLOBIN g/dL 9 7*   HEMATOCRIT % 29 4*   PLATELETS Thousands/uL 193   NEUTROS PCT % 72   LYMPHS PCT % 16*   MONOS PCT % 9   EOS PCT % 1     Results from last 7 days   Lab Units 03/18/21  1456   SODIUM mmol/L 141   POTASSIUM mmol/L 4 5   CHLORIDE mmol/L 102   CO2 mmol/L 21   BUN mg/dL 55*   CREATININE mg/dL 5 56*   ANION GAP mmol/L 18*   CALCIUM mg/dL 8 8   ALBUMIN g/dL 3 8   TOTAL BILIRUBIN mg/dL 0 40   ALK PHOS U/L 213*   ALT U/L 53*   AST U/L 74*   GLUCOSE RANDOM mg/dL 120*     Results from last 7 days   Lab Units 03/18/21  1456   INR  1 05     Results from last 7 days   Lab Units 03/18/21  1835   POC GLUCOSE mg/dl 249*         Results from last 7 days   Lab Units 03/18/21  1659 03/18/21  1456   LACTIC ACID mmol/L 1 3 3 0*   PROCALCITONIN ng/ml  --  0 22       Imaging: I have personally reviewed pertinent reports      XR chest portable   Final Result by Linda Gonzalez MD (03/18 1643)      Persistent right lower lobe airspace consolidation most consistent with pneumonia  Workstation performed: [id number]         NM inpatient order    (Results Pending)       Epic / Care Everywhere Records Reviewed: Yes    ** Please Note: This note has been constructed using a voice recognition system   **

## 2021-03-19 NOTE — ASSESSMENT & PLAN NOTE
Lab Results   Component Value Date    HGBA1C 6 8 (H) 09/21/2020       Recent Labs     03/18/21  2323 03/19/21  0459 03/19/21  0557 03/19/21  1041   POCGLU 180* 56* 85 57*       Blood Sugar Average: Last 72 hrs:  (P) 715 [id number]   · Recommend discontinuing insulin on discharge  · Hypoglycemic, monitor off insulin  · Accpatricia, SSI

## 2021-03-19 NOTE — OCCUPATIONAL THERAPY NOTE
Occupational Therapy Evaluation      Orvilla Dance    3/19/2021    Patient Active Problem List   Diagnosis    Type 2 diabetes mellitus with hyperglycemia, with long-term current use of insulin (HCC)    Essential hypertension    Mixed hyperlipidemia    Iron deficiency anemia secondary to inadequate dietary iron intake    Liver mass    Neuroendocrine tumor    Metastatic neuroendocrine tumor    Pressure injury of right heel, unstageable (HCC)    Atherosclerosis of artery of extremity with ulceration (HCC)    Carcinoid syndrome (HCC)    Urinary catheter in place    Malignant neuroendocrine neoplasm ([address] )    Abdominal pain    Lactic acidosis    UTI due to extended-spectrum beta lactamase (ESBL) producing Escherichia coli    Hypomagnesemia    Normocytic anemia    Goals of care, counseling/discussion    Hyperparathyroid bone disease (HCC)    Suspected C diff related diarrhea    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    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    Low TSH level    Severe protein-calorie malnutrition (HCC)    Chronic Diastolic congestive heart failure    Premature atrial complexes    Bradycardia    Generalized weakness    Recurrent UTI    Gastroesophageal reflux disease without esophagitis    Ureterolithiasis    Obstructive uropathy    Gait difficulty    CAD (coronary artery disease)    Azotemia    Acidosis    Hypertensive kidney disease with stage 5 chronic kidney disease, not on chronic dialysis (HCC)    Edema    Chest pain    Chest pressure    Urinary retention    Elevated d-dimer    Esophageal dysphagia    Dizziness    Anemia due to chronic kidney disease    CKD (chronic kidney disease) stage 5, GFR less than 15 ml/min (HCC)    Vasovagal episode    Acute kidney injury superimposed on CKD ([address] )    History of Clostridioides difficile infection    A-fib ([address] )    Moderate protein-calorie malnutrition (HCC)    UTI (urinary tract infection) due to chronic indwelling catheter ([facility] )    Anemia    Delirium    Embolism and thrombosis of arteries of the lower extremities (HCC)    Thrombocytopenia, unspecified (HCC)    Pleural effusion    Secondary hyperparathyroidism of renal origin ([facility] )    Stage 5 chronic kidney disease not on chronic dialysis ([facility] )    Hypoxia       Past Medical History:   Diagnosis Date    Acute pancreatitis without infection or necrosis 9/26/2019    Age-related nuclear cataract, right 11/5/2020    Anemia of chronic renal failure     BPH (benign prostatic hyperplasia)     CKD (chronic kidney disease) stage 5, GFR less than 15 ml/min (HCC)     Coronary artery disease     COVID-19     DJD (degenerative joint disease)     Essential hypertension     Gait disturbance     Generalized weakness     GERD (gastroesophageal reflux disease)     Gout     History of transfusion     Hydronephrosis     Hyperlipidemia     Hypertension     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)     Pressure injury of skin     Proteinuria     Sepsis ([facility] ) 3/11/2020    Syncope and collapse 1/27/2019    Thrombophlebitis migrans     Type 2 diabetes mellitus        Past Surgical History:   Procedure Laterality Date    CATARACT EXTRACTION Right 11/05/2020    CATARACT EXTRACTION W/ INTRAOCULAR LENS IMPLANT Right 11/5/2020    Procedure: EYE OD CATARACT EXTRACTION WITH IOL INSERTION;  Surgeon: Kiran Parikh MD;  Location: [address] [place] [address] OR;  Service: Ophthalmology    CHOLECYSTECTOMY     [address] [place] [address] N/A 2/7/2020    Procedure: CHOLECYSTECTOMY LAPAROSCOPIC;  Surgeon:  Carmel Askew MD;  Location: [address] [place] [address] OR;  Service: General    CORONARY ANGIOPLASTY WITH STENT PLACEMENT      FL RETROGRADE PYELOGRAM  5/21/2020    IR BIOPSY LIVER MASS  1/24/2019    IR CHOLECYSTOSTOMY TUBE PLACEMENT  9/6/2019    IR NON-TUNNELED CENTRAL LINE PLACEMENT  1/28/2021    MA CYSTO/URETERO W/LITHOTRIPSY &INDWELL STENT INSRT Left 5/21/2020    Procedure: CYSTOSCOPY URETEROSCOPY WITH LITHOTRIPSY HOLMIUM LASER, RETROGRADE PYELOGRAM AND INSERTION STENT URETERAL;  Surgeon: Angella Miranda MD;  [facility];  Service: Urology    DC CYSTOURETHROSCOPY,URETER CATHETER Left 4/21/2020    Procedure: CYSTOSCOPY WITH INSERTION STENT URETERAL;  Surgeon: Yue Boudreaux MD;  [facility];  Service: Urology        03/19/21 0810   OT Last Visit   OT Visit Date 03/19/21   Note Type   Note type Evaluation   Restrictions/Precautions   Weight Bearing Precautions Per Order No   Other Precautions Fall Risk;Pain;Cognitive   Pain Assessment   Pain Assessment Tool FLACC   Pain Location/Orientation Orientation: Right;Location: Arm;Location: Knee   Pain Rating: FLACC (Rest) - Face 0   Pain Rating: FLACC (Rest) - Legs 0   Pain Rating: FLACC (Rest) - Activity 0   Pain Rating: FLACC (Rest) - Cry 0   Pain Rating: FLACC (Rest) - Consolability 0   Score: FLACC (Rest) 0   Pain Rating: FLACC (Activity) - Face 1   Pain Rating: FLACC (Activity) - Legs 0   Pain Rating: FLACC (Activity) - Activity 0   Pain Rating: FLACC (Activity) - Cry 1   Pain Rating: FLACC (Activity) - Consolability 0   Score: FLACC (Activity) 2   Home Living   Type of Home House   Home Layout One level;Performs ADLs on one level; Able to live on main level with bedroom/bathroom; Ramped entrance   [facility]  (with BS overtop )   Bathroom Equipment Grab bars in shower; Shower chair;Commode   [address]  (Pt using manual w/c at baseline )   Additional Comments Home environment per 1/25/21 chart review d/t pt decreased engagement and alertness during session    Prior Function   Level of [place] Needs assistance with IADLs; Needs assistance with ADLs and functional mobility   Lives With Spouse   Receives Help From Family   ADL Assistance Needs assistance   IADLs Needs assistance   Falls in the last 6 months   (+ Fall Hx per chart review )   Vocational Retired   Lifestyle   Autonomy Per chart review, Pt lives with wife in a 1 story home with ramped entrance  Pt recieves assistance with ADLs and IADLs PTA, is retired, uses manual w/c at baseline    Reciprocal Relationships wife    Service to Others retired    Subjective   Subjective Pt found asleep upon arrival c increased time needed to arouse  Pt demonstrated fatigue and decreased alertness and engagement throughout session with occasional head nods and "yes" / "no" answers  Pt cooperative and agreeable throughout session  ADL   Eating Assistance 5  Supervision/Setup   Grooming Assistance 5  Supervision/Setup   UB Bathing Assistance 4  Minimal Assistance   LB Bathing Assistance 3  Moderate Assistance   UB Dressing Assistance 3  Moderate Assistance   LB Dressing Assistance 2  Maximal Assistance   Toileting Assistance  2  Maximal Assistance   Additional Comments Pt limited by decreased strength, endurance, and pain    Bed Mobility   Supine to Sit 3  Moderate assistance   Additional items Assist x 2;HOB elevated; Increased time required;Verbal cues;LE management; Bedrails   Sit to Supine 4  Minimal assistance   Additional items Assist x 2;Verbal cues;LE management; Bedrails; Increased time required   Additional Comments Pt sat EOB ~3 minutes with no overt LOB, but physical assitance needed to maintain static sitting  No report of lighteadedness/dizziness    Transfers   Sit to Stand 3  Moderate assistance   Additional items Assist x 2;Bedrails; Increased time required;Verbal cues   Stand to Sit 3  Moderate assistance   Additional items Assist x 2;Bedrails;Verbal cues; Increased time required   Additional Comments Pt tolerated standing EOB <15 seconds to allow for linen changing  Denied lighteaded/dizziness c positional changes    Balance   Static Sitting Fair -   Dynamic Sitting Poor +   Static Standing Poor +    Activity Tolerance   Activity Tolerance Patient limited by pain; Patient limited by fatigue   Medical Staff Made Aware Pt Lopez Garcia present during session    Nurse Made Aware RN made aware of outcomes    RUE Assessment   RUE Assessment X  (AROM~30 degrees, PROM~50 degrees, pt reports pain c movement)   LUE Assessment   LUE Assessment WFL  (AROM ~ 120, MMT ~3/5 based on functional eval )   Hand Function   Gross Motor Coordination Impaired   Fine Motor Coordination Impaired   Sensation   Light Touch No apparent deficits   Vision-Basic Assessment   Current Vision Wears glasses only for reading  (per chart review )   Cognition   Overall Cognitive Status Impaired   Arousal/Participation Responsive;Arousable; Cooperative   Attention Attends with cues to redirect   Orientation Level Disoriented to situation;Disoriented to time;Disoriented to place; Disoriented to person  (Pt unable to state  or biographical information )   Memory Decreased recall of biographical information;Decreased recall of recent events;Decreased recall of precautions   Following Commands Follows one step commands without difficulty   Comments Pt agreeable to OT session  Assessment   Limitation Decreased ADL status; Decreased UE ROM; Decreased UE strength;Decreased Safe judgement during ADL;Decreased cognition;Decreased endurance;Decreased self-care trans;Decreased high-level ADLs   Prognosis Good   Assessment Patient is a 79 y o  male seen for OT evaluation at [address] [place] [address] on 3/18/2021  s/p Hypoxia  Comorbidities impacting functional performance include: DM2, HTN, carcinoid syndrome, lactic acidosis, CHR, GERD, urinary retention, CKD, SILVERIO, and anemia  Patient presents with active orders for OT eval and treat and up and OOB as tolerated   Performed at least 2 patient identifiers during session including name and wristband  Per chart review, pt requires assistance  with ADL/IADLs, is ambulatory with manual w/c, retired, and lives with wife in a 1 story house  with ramped entrance   Upon initial evaluation, patient requires mod assist for UB ADLs, max assist for LB ADLs, and mod assist x2 for transfers and static standing with RW  Based on functional eval, patient presents A&Ox0 with intact attention, but impaired safety awareness, and memory  Occupational performance is affected by the following deficits: endurance , muscular strength , muscle tone , range of motion , balance , memory , initiation , orientation , awareness , alertness  and (+) pain   Patient would benefit from OT services to maximize independence in self-care and transfers  Personal factors impacting performance include: (+) Hx of falls , decreased ADL independence  and decreased IADL independence  Occupational areas to address include:bathing/showering, toileting, dressing , eating , personal hygiene/grooming , bed mobility , functional mobility, safety awareness  and fall prevention   Recommending post-acute rehabilitation  upon D/C  Plan   Treatment Interventions ADL retraining;Functional transfer training;UE strengthening/ROM; Endurance training;Cognitive reorientation;Patient/family training; Activityengagement   Goal Expiration Date 03/29/21   OT Treatment Day 0   OT Frequency 3-5x/wk   Recommendation   OT Discharge Recommendation Post-Acute Rehabilitation Services   OT - OK to Discharge Yes  (Once medically clear)   Additional Comments  Pt in bed c breakfast set up at end of session c call bell in reach    Additional Comments 2 The patient's raw score on the AM-PAC Daily Activity inpatient short form is 13, standardized score is 32 03, less than 39 4  Patients at this level are likely to benefit from discharge to post-acute rehabilitation services  Please refer to the recommendation of the Occupational Therapist for safe discharge planning     AM-PAC Daily Activity Inpatient   Lower Body Dressing 1   Bathing 2   Toileting 1   Upper Body Dressing 2   Grooming 3   Eating 4   Daily Activity Raw Score 13   Daily Activity Standardized Score (Calc for Raw Score >=11) 32 03   AM-PAC Applied Cognition Inpatient   Following a Speech/Presentation 2   Understanding Ordinary Conversation 3   Taking Medications 1   Remembering Where Things Are Placed or Put Away 2   Remembering List of 4-5 Errands 2   Taking Care of Complicated Tasks 1   Applied Cognition Raw Score 11   Applied Cognition Standardized Score 27 03   Barthel Index   Feeding 5   Bathing 0   Grooming Score 5   Dressing Score 5   Bladder Score 0   Bowels Score 5   Toilet Use Score 5   Transfers (Bed/Chair) Score 5   Mobility (Level Surface) Score 0   Stairs Score 0  (DNT)   Barthel Index Score 30     Goals    Pt will complete UB ADLs Supervision with use of AE as needed for increased ADL independence within 10 days  Pt will complete LB ADLs Min A  with use of AE as needed for increased ADL independence within 10 days  Pt will complete toileting Min A  with use of DME for increased ADL independence within 10 days  Pt will demonstrate proper body mechanics to complete transfers and functional mobility with Min A and use of AD for increased safety and functional mobility within 10 days  Pt will demonstrate standing tolerance of 4 minutes with Supervision and use of AD for increased endurance and activity tolerance within 10 days  Pt will demonstrate OOB sitting tolerance of 2-4 hr/day for increased activity tolerance and engagement within 10 days  Pt will participate in 10 min of BUE therapeutic exercise to increase strength, ROM, and endurance during ADL/IADLs within 10 days  Pt will receive education on use of compensatory memory strategies for increased safety and independent with IADL tasks  Pt will participate in ongoing cognitive assessments to assist with safe D/C planning and recommendations       Pt will demonstrate proper body mechanics and fall prevention strategies during 100% of tx sessions for increased safety awareness during ADL/IADLs    Shantelle Adams, OTS

## 2021-03-20 NOTE — ASSESSMENT & PLAN NOTE
Lab Results   Component Value Date    HGBA1C 6 8 (H) 09/21/2020       Recent Labs     03/19/21 2014 03/20/21  0614 03/20/21  0738 03/20/21  1015   POCGLU 317* 162* 197* 292*       Blood Sugar Average: Last 72 hrs:  (P) 047 [id number]   · Continue home dose of 5 units of Levemir  · Hypoglycemic, monitor off insulin  · [employer], SSI

## 2021-03-20 NOTE — PLAN OF CARE
Problem: Potential for Falls  Goal: Patient will remain free of falls  Description: INTERVENTIONS:  - Assess patient frequently for physical needs  -  Identify cognitive and physical deficits and behaviors that affect risk of falls    -  [place] fall precautions as indicated by assessment   - Educate patient/family on patient safety including physical limitations  - Instruct patient to call for assistance with activity based on assessment  - Modify environment to reduce risk of injury  - Consider OT/PT consult to assist with strengthening/mobility  Outcome: Adequate for Discharge     Problem: Prexisting or High Potential for Compromised Skin Integrity  Goal: Skin integrity is maintained or improved  Description: INTERVENTIONS:  - Identify patients at risk for skin breakdown  - Assess and monitor skin integrity  - Assess and monitor nutrition and hydration status  - Monitor labs   - Assess for incontinence   - Turn and reposition patient  - Assist with mobility/ambulation  - Relieve pressure over bony prominences  - Avoid friction and shearing  - Provide appropriate hygiene as needed including keeping skin clean and dry  - Evaluate need for skin moisturizer/barrier cream  - Collaborate with interdisciplinary team   - Patient/family teaching  - Consider wound care consult   Outcome: Adequate for Discharge     Problem: PAIN - ADULT  Goal: Verbalizes/displays adequate comfort level or baseline comfort level  Description: Interventions:  - Encourage patient to monitor pain and request assistance  - Assess pain using appropriate pain scale  - Administer analgesics based on type and severity of pain and evaluate response  - Implement non-pharmacological measures as appropriate and evaluate response  - Consider cultural and social influences on pain and pain management  - Notify physician/advanced practitioner if interventions unsuccessful or patient reports new pain  Outcome: Adequate for Discharge     Problem: INFECTION - ADULT  Goal: Absence or prevention of progression during hospitalization  Description: INTERVENTIONS:  - Assess and monitor for signs and symptoms of infection  - Monitor lab/diagnostic results  - Monitor all insertion sites, i e  indwelling lines, tubes, and drains  - Monitor endotracheal if appropriate and nasal secretions for changes in amount and color  - [place] appropriate cooling/warming therapies per order  - Administer medications as ordered  - Instruct and encourage patient and family to use good hand hygiene technique  - Identify and instruct in appropriate isolation precautions for identified infection/condition  Outcome: Adequate for Discharge     Problem: INFECTION - ADULT  Goal: Absence or prevention of progression during hospitalization  Description: INTERVENTIONS:  - Assess and monitor for signs and symptoms of infection  - Monitor lab/diagnostic results  - Monitor all insertion sites, i e  indwelling lines, tubes, and drains  - Monitor endotracheal if appropriate and nasal secretions for changes in amount and color  - [place] appropriate cooling/warming therapies per order  - Administer medications as ordered  - Instruct and encourage patient and family to use good hand hygiene technique  - Identify and instruct in appropriate isolation precautions for identified infection/condition  Outcome: Adequate for Discharge     Problem: SAFETY ADULT  Goal: Patient will remain free of falls  Description: INTERVENTIONS:  - Assess patient frequently for physical needs  -  Identify cognitive and physical deficits and behaviors that affect risk of falls    -  [place] fall precautions as indicated by assessment   - Educate patient/family on patient safety including physical limitations  - Instruct patient to call for assistance with activity based on assessment  - Modify environment to reduce risk of injury  - Consider OT/PT consult to assist with strengthening/mobility  Outcome: Adequate for Discharge  Goal: Maintain or return to baseline ADL function  Description: INTERVENTIONS:  -  Assess patient's ability to carry out ADLs; assess patient's baseline for ADL function and identify physical deficits which impact ability to perform ADLs (bathing, care of mouth/teeth, toileting, grooming, dressing, etc )  - Assess/evaluate cause of self-care deficits   - Assess range of motion  - Assess patient's mobility; develop plan if impaired  - Assess patient's need for assistive devices and provide as appropriate  - Encourage maximum independence but intervene and supervise when necessary  - Involve family in performance of ADLs  - Assess for home care needs following discharge   - Consider OT consult to assist with ADL evaluation and planning for discharge  - Provide patient education as appropriate  Outcome: Adequate for Discharge  Goal: Maintain or return mobility status to optimal level  Description: INTERVENTIONS:  - Assess patient's baseline mobility status (ambulation, transfers, stairs, etc )    - Identify cognitive and physical deficits and behaviors that affect mobility  - Identify mobility aids required to assist with transfers and/or ambulation (gait belt, sit-to-stand, lift, walker, cane, etc )  - [place] fall precautions as indicated by assessment  - Record patient progress and toleration of activity level on Mobility SBAR; progress patient to next Phase/Stage  - Instruct patient to call for assistance with activity based on assessment  - Consider rehabilitation consult to assist with strengthening/weightbearing, etc   Outcome: Adequate for Discharge     Problem: DISCHARGE PLANNING  Goal: Discharge to home or other facility with appropriate resources  Description: INTERVENTIONS:  - Identify barriers to discharge w/patient and caregiver  - Arrange for needed discharge resources and transportation as appropriate  - Identify discharge learning needs (meds, wound care, etc )  - Arrange for interpretive services to assist at discharge as needed  - Refer to Case Management Department for coordinating discharge planning if the patient needs post-hospital services based on physician/advanced practitioner order or complex needs related to functional status, cognitive ability, or social support system  Outcome: Adequate for Discharge     Problem: Knowledge Deficit  Goal: Patient/family/caregiver demonstrates understanding of disease process, treatment plan, medications, and discharge instructions  Description: Complete learning assessment and assess knowledge base    Interventions:  - Provide teaching at level of understanding  - Provide teaching via preferred learning methods  Outcome: Adequate for Discharge     Problem: RESPIRATORY - ADULT  Goal: Achieves optimal ventilation and oxygenation  Description: INTERVENTIONS:  - Assess for changes in respiratory status  - Assess for changes in mentation and behavior  - Position to facilitate oxygenation and minimize respiratory effort  - Oxygen administered by appropriate delivery if ordered  - Initiate smoking cessation education as indicated  - Encourage broncho-pulmonary hygiene including cough, deep breathe, Incentive Spirometry  - Assess the need for suctioning and aspirate as needed  - Assess and instruct to report SOB or any respiratory difficulty  - Respiratory Therapy support as indicated  Outcome: Adequate for Discharge     Problem: SKIN/TISSUE INTEGRITY - ADULT  Goal: Skin integrity remains intact  Description: INTERVENTIONS  - Identify patients at risk for skin breakdown  - Assess and monitor skin integrity  - Assess and monitor nutrition and hydration status  - Monitor labs (i e  albumin)  - Assess for incontinence   - Turn and reposition patient  - Assist with mobility/ambulation  - Relieve pressure over bony prominences  - Avoid friction and shearing  - Provide appropriate hygiene as needed including keeping skin clean and dry  - Evaluate need for skin moisturizer/barrier cream  - Collaborate with interdisciplinary team (i e  Nutrition, Rehabilitation, etc )   - Patient/family teaching  Outcome: Adequate for Discharge     Problem: MUSCULOSKELETAL - ADULT  Goal: Maintain or return mobility to safest level of function  Description: INTERVENTIONS:  - Assess patient's ability to carry out ADLs; assess patient's baseline for ADL function and identify physical deficits which impact ability to perform ADLs (bathing, care of mouth/teeth, toileting, grooming, dressing, etc )  - Assess/evaluate cause of self-care deficits   - Assess range of motion  - Assess patient's mobility  - Assess patient's need for assistive devices and provide as appropriate  - Encourage maximum independence but intervene and supervise when necessary  - Involve family in performance of ADLs  - Assess for home care needs following discharge   - Consider OT consult to assist with ADL evaluation and planning for discharge  - Provide patient education as appropriate  Outcome: Adequate for Discharge

## 2021-03-20 NOTE — ASSESSMENT & PLAN NOTE
· Resolved, Currently on room air  · NM scan, low prob for PE, d/c heparin gtt  · Pro arnulfo neg, d/c IV abx  · Possibly reaction given recent infusion

## 2021-03-20 NOTE — DISCHARGE SUMMARY
[facility]  Discharge- Ashish Mobley 1940, 79 y o  male MRN: [id number]  Unit/Bed#: -01 Encounter: [id number]  Primary Care Provider: RIKA Stevenson   Date and time admitted to hospital: 3/18/2021  2:25 PM    Acute kidney injury superimposed on CKD [facility])  Assessment & Plan  Lab Results   Component Value Date    EGFR 9 03/20/2021    EGFR 9 03/19/2021    EGFR 9 03/18/2021    CREATININE 5 47 (H) 03/20/2021    CREATININE 5 49 (H) 03/19/2021    CREATININE 5 56 (H) 03/18/2021   · Likely secondary to volume depletion with diarrhea  · IV fluids  · Nephrology consult    Chronic Diastolic congestive heart failure  Assessment & Plan  · Continue home medications    Malignant neuroendocrine neoplasm [facility])  Assessment & Plan  · Patient was seen by Hematology-Oncology today CT scans were reviewed with progression of his disease      · Hospice was discussed and patient reports that he would like to continue treatment  · Has [facility], can transition to hospice if patient decides in the future    Carcinoid syndrome [facility])  Assessment & Plan  · Patient with diarrhea felt likely secondary to progression of his neuroendocrine metastatic malignancy  · Continue Imodium 2mg TID    Essential hypertension  Assessment & Plan  · Continue metoprolol and Procardia    Type 2 diabetes mellitus with hyperglycemia, with long-term current use of insulin [facility])  Assessment & Plan  Lab Results   Component Value Date    HGBA1C 6 8 (H) 09/21/2020       Recent Labs     03/19/21  2014 03/20/21  0614 03/20/21  0738 03/20/21  1015   POCGLU 317* 162* 197* 292*       Blood Sugar Average: Last 72 hrs:  (P) 691 [id number]   · Continue home dose of 5 units of Levemir  · Hypoglycemic, monitor off insulin  · Accuechecks, SSI    * Hypoxia  Assessment & Plan  · Resolved, Currently on room air  · NM scan, low prob for PE, d/c heparin gtt  · Pro arnulfo neg, d/c IV abx  · Possibly reaction given recent infusion        Discharging Physician / Practitioner: Josie Herbert MD  PCP: Roxanna Dang, [facility]  Admission Date:   Admission Orders (From admission, onward)     Ordered        03/18/21 1630  Inpatient Admission  Once                   Discharge Date: 03/20/21    Resolved Problems  Date Reviewed: 3/20/2021    None          Consultations During Hospital Stay:  · Nephrology    Procedures Performed:   · None    Significant Findings / Test Results:   Xr Chest Portable    Result Date: 3/18/2021  Impression: Persistent right lower lobe airspace consolidation most consistent with pneumonia  Workstation performed: [id number]     Nm Lung Perfusion Imaging (particulate)    Result Date: 3/19/2021  · Impression: The probability for pulmonary embolus is low  Workstation performed: [id number]   ·     Incidental Findings:   · None     Test Results Pending at Discharge (will require follow up): · None     Outpatient Tests Requested:  · None    Complications:  None    Reason for Admission: Hypoxia    Hospital Course:     Jose Castro is a 79 y o  male patient who originally presented to the hospital on 3/18/2021 due to SOB  Has past medical history carcinoid syndrome, malignant neuroendocrine neoplasm presented after having IV infusion with Oncology with symptoms of shortness of breath  Given concern of history of malignancy, patient was empirically started on heparin drip and was unable to get CT scan given CKD stage 5  Nuclear medicine perfusion scan was completed showing low probability and patient's symptoms quickly resolved  He was also started empirically on antibiotics given concern for pneumonia but procalcitonin was negative  Overall, patient has history of multiple admissions for varying symptoms  As discussed per Oncology, patient is a poor candidate for any curative treatment and is currently getting infusions with palliative intent, discussed hospice but patient and girlfriend currently declines  Nephrology has been following given his CKD and also recommended hospice as patient renal function has decline significantly for past several months that he is candidate for dialysis but currently declines  Case management reported that patient current home health care can convert to hospice and they were notified prior to discharge to discuss hospice as well  Patient was discharged home with no change in medications and resume current followups as previously intended  To follow-up with oncology outpatient to continue lanreotide infusions  Please see above list of diagnoses and related plan for additional information  Condition at Discharge: fair     Discharge Day Visit / Exam:     Subjective:  Patient doing well, no complaints overnight  Diarrhea has improved on immodium  On room air and appetite has improved  Vitals: Blood Pressure: (!) 191/87 (03/20/21 0700)  Pulse: 78 (03/20/21 0700)  Temperature: (!) 97 3 °F (36 3 °C) (03/20/21 0700)  Temp Source: Temporal (03/20/21 0700)  Respirations: 18 (03/20/21 0700)  Height: 5' 8" (172 7 cm) (03/18/21 1733)  Weight - Scale: 72 3 kg (159 lb 6 3 oz) (03/18/21 1733)  SpO2: 96 % (03/20/21 0700)  Exam:   Physical Exam  Vitals signs reviewed  Constitutional:       General: He is not in acute distress  Appearance: He is obese  He is ill-appearing  Comments: Frail, lethargic   HENT:      Head: Normocephalic and atraumatic  Eyes:      Conjunctiva/sclera: Conjunctivae normal    Neck:      Musculoskeletal: Normal range of motion  Cardiovascular:      Rate and Rhythm: Normal rate and regular rhythm  Heart sounds: Normal heart sounds  No murmur  Pulmonary:      Effort: Pulmonary effort is normal  No respiratory distress  Breath sounds: Normal breath sounds  Abdominal:      General: Bowel sounds are normal  There is no distension  Palpations: Abdomen is soft  Tenderness: There is no abdominal tenderness     Genitourinary:     Comments: Chronic Haile  Musculoskeletal: Normal range of motion  Right lower leg: No edema  Left lower leg: No edema  Skin:     General: Skin is warm and dry  Coloration: Skin is pale  Neurological:      Mental Status: He is alert  Mental status is at baseline  Discussion with Family: Patient, Girlfriend at bedside    Discharge instructions/Information to patient and family:   See after visit summary for information provided to patient and family  Provisions for Follow-Up Care:  See after visit summary for information related to follow-up care and any pertinent home health orders  Disposition:     Home with [facility] Services (Reminder: Complete face to face encounter)    For Discharges to [facility]F:   · Not Applicable to this Patient - Not Applicable to this Patient    Planned Readmission: None     Discharge Statement:  I spent 35 minutes discharging the patient  This time was spent on the day of discharge  I had direct contact with the patient on the day of discharge  Greater than 50% of the total time was spent examining patient, answering all patient questions, arranging and discussing plan of care with patient as well as directly providing post-discharge instructions  Additional time then spent on discharge activities  Discharge Medications:  See after visit summary for reconciled discharge medications provided to patient and family        ** Please Note: This note has been constructed using a voice recognition system **

## 2021-03-20 NOTE — PROGRESS NOTES
NEPHROLOGY PROGRESS NOTE   oHmer Marcelino 79 y o  male MRN: [id number]  Unit/Bed#: -01 Encounter: [id number]    Assessment/Plan:    In summary, this is 30-year-old man well known to this service with pertinent medical problems of CKD 5 not on dialysis, metastatic neuroendocrine carcinoma and carcinoid syndrome admitted 3/18 after having dyspnea status post lancreotide injection  Stop IV fluids  Tentative discharge home today    1  Acute Kidney Injury (POA) versus progression of renal disease  atop chronic kidney disease  ? Renal function essentially unchanged with volume expansion  Will stop IV fluids  Tentative discharge home today  Will happily see in office in about 2 weeks with updated blood work  Again, patient does not wish to pursue any form of renal replacement therapy  If he changes his mind, will refer to the appropriate specialist   ? Continue to avoid nephrotoxins, maximize hemodynamics, provide supportive care  2  Stage 5 Chronic Kidney Disease with baseline creatinine around 5 0 mg/dL  ? Again, as above, does not wish to pursue renal replacement therapy  Most recent blood work may reflect progression  3  Acid/Base  ? Continue sodium bicarbonate tablets  4  Hypertension associated with CKD 5  ? Blood pressure elevated this morning prior to receiving his oral antihypertensives  5  Metastatic neuroendocrine tumor with carcinoid syndrome  ? Is taking Imodium around the clock for relief from diarrhea  6  Mineral Bone Disease of CKD  ? Continue to monitor as an outpatient  7  Urinary retention with chronic indwelling urinary catheter   8  Multifactorial Anemia  ? Management per Hematology      ROS  No physical complaints on exam   A complete 10 point review of systems have been performed and are otherwise negative         Historical Information   Past Medical History:   Diagnosis Date    Acute pancreatitis without infection or necrosis 9/26/2019    Age-related nuclear cataract, right 11/5/2020  Anemia of chronic renal failure     BPH (benign prostatic hyperplasia)     CKD (chronic kidney disease) stage 5, GFR less than 15 ml/min (HCC)     Coronary artery disease     COVID-19     DJD (degenerative joint disease)     Essential hypertension     Gait disturbance     Generalized weakness     GERD (gastroesophageal reflux disease)     Gout     History of transfusion     Hydronephrosis     Hyperlipidemia     Hypertension     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)     Pressure injury of skin     Proteinuria     Sepsis ([address] ) 3/11/2020    Syncope and collapse 1/27/2019    Thrombophlebitis migrans     Type 2 diabetes mellitus      Past Surgical History:   Procedure Laterality Date    CATARACT EXTRACTION Right 11/05/2020    CATARACT EXTRACTION W/ INTRAOCULAR LENS IMPLANT Right 11/5/2020    Procedure: EYE OD CATARACT EXTRACTION WITH IOL INSERTION;  Surgeon: Roberta Burks MD;  Location: [facility] MAIN OR;  Service: Ophthalmology    CHOLECYSTECTOMY     [address] [place] [address] N/A 2/7/2020    Procedure: CHOLECYSTECTOMY LAPAROSCOPIC;  Surgeon:  Iron Swartz MD;  Location: [facility] MAIN OR;  Service: General    CORONARY ANGIOPLASTY WITH STENT PLACEMENT      FL RETROGRADE PYELOGRAM  5/21/2020    IR BIOPSY LIVER MASS  1/24/2019    IR CHOLECYSTOSTOMY TUBE PLACEMENT  9/6/2019    IR NON-TUNNELED CENTRAL LINE PLACEMENT  1/28/2021    TX CYSTO/URETERO W/LITHOTRIPSY &INDWELL STENT INSRT Left 5/21/2020    Procedure: CYSTOSCOPY URETEROSCOPY WITH LITHOTRIPSY HOLMIUM LASER, RETROGRADE PYELOGRAM AND INSERTION STENT URETERAL;  Surgeon: Lazaro Chan MD;  Location: [facility];  Service: Urology    TX CYSTOURETHROSCOPY,URETER CATHETER Left 4/21/2020    Procedure: CYSTOSCOPY WITH INSERTION STENT URETERAL;  Surgeon: Amber Huerta MD;  [facility];  Service: Urology     Social History   Social History     Substance and Sexual Activity   Alcohol Use Never    Alcohol/week: 0 0 standard drinks    Frequency: Never     Social History     Substance and Sexual Activity   Drug Use Never     Social History     Tobacco Use   Smoking Status Never Smoker   Smokeless Tobacco Never Used       Family History:   Family History   Problem Relation Age of Onset    Cancer Mother     Hypertension Father     Cancer Brother     Cancer Maternal Grandmother     Cancer Paternal Aunt        Medications:  Pertinent medications were reviewed  Current Facility-Administered Medications   Medication Dose Route Frequency Provider Last Rate    acetaminophen  650 mg Oral Q6H PRN Maggy Bullard MD      aspirin  81 mg Oral Daily Maggy Bullard MD      b complex-vitamin C-folic acid  1 capsule Oral Daily With [address] Gerardo Dodson MD      cholecalciferol  1,000 Units Oral Daily Maggy Bullard MD      vitamin B-12  100 mcg Oral Daily Maggy Bullard MD      docusate sodium  100 mg Oral BID Maggy Bullard MD      heparin (porcine)  5,000 Units Subcutaneous [facility] Jose Francisco Miranda MD      hydrALAZINE  10 mg Intravenous Q6H PRN Jose Francisco Miranda MD      insulin lispro  1-5 Units Subcutaneous TID [facility] Maggy Bullard MD      insulin lispro  1-5 Units Subcutaneous HS Maggy Bullard MD      loperamide  2 mg Oral TID Jose Francisco Miranda MD      magnesium oxide  200 mg Oral HS Jose Francisco Miranda MD      metoprolol tartrate  12 5 mg Oral Q12H 2020 Jayson Brown MD      NIFEdipine  60 mg Oral Daily RIKA Lamas      ondansetron  4 mg Intravenous Q6H PRN Maggy Bullard MD      pantoprazole  40 mg Oral Daily Maggy Bullard MD      sodium bicarbonate  650 mg Oral BID Maggy Bullard MD      tamsulosin  0 4 mg Oral Daily With 1139 Gerardo Dodson MD      zinc sulfate  220 mg Oral Daily Jose Francisco Miranda MD           No Known Allergies      Vitals:   BP (!) 191/87 (BP Location: Right arm)   Pulse 78   Temp (!) 97 3 °F (36 3 °C) (Temporal)   Resp 18    5' 8" (1 727 m)   Wt 72 3 kg (159 lb 6 3 oz)   SpO2 96%   BMI 24 24 kg/m²   Body mass index is 24 24 kg/m²  SpO2: 96 %,   SpO2 Activity: At Rest,   O2 Device: None (Room air)      Intake/Output Summary (Last 24 hours) at 3/20/2021 1028  Last data filed at 3/20/2021 0426  Gross per 24 hour   Intake 780 ml   Output 2350 ml   Net -1570 ml     Invasive Devices     Peripheral Intravenous Line            Peripheral IV 03/09/21 Left Antecubital 10 days    Peripheral IV 03/18/21 Arm 1 day          Drain            Urethral Catheter Double-lumen 16 Fr  10 days                Physical Exam  General: conscious, cooperative, in no acute distress, chronically ill-appearing  Eyes: conjunctivae pink, anicteric sclerae  ENT: lips and mucous membranes moist  Neck: supple, no JVD, no masses  Chest: clear breath sounds bilateral, no crackles, ronchus or wheezings  CVS: distinct S1 & S2, normal rate, regular rhythm  Abdomen: soft, non-tender, non-distended, normoactive bowel sounds  Extremities: no edema of both legs  Skin: no rash  Neuro: awake, alert, oriented      Diagnostic Data:  Lab: I have personally reviewed pertinent lab results  ,   CBC:  Results from last 7 days   Lab Units 03/19/21  0454   WBC Thousand/uL 8 10   HEMOGLOBIN g/dL 8 9*   HEMATOCRIT % 26 3*   PLATELETS Thousands/uL 155      CMP:   Lab Results   Component Value Date    SODIUM 140 03/20/2021    K 5 0 03/20/2021     03/20/2021    CO2 22 03/20/2021    BUN 52 (H) 03/20/2021    CREATININE 5 47 (H) 03/20/2021    CALCIUM 8 0 (L) 03/20/2021    EGFR 9 03/20/2021   ,   PT/INR: No results found for: PT, INR,   Magnesium: No components found for: MAG,  Phosphorous: No results found for: PHOS    Microbiology:  @LABRCNTIP,(urinecx:7)@        RIKA Reynaga    Portions of the record may have been created with voice recognition software   Occasional wrong word or "sound a like" substitutions may have occurred due to the inherent limitations of voice recognition software  Read the chart carefully and recognize, using context, where substitutions have occurred

## 2021-03-20 NOTE — ASSESSMENT & PLAN NOTE
Lab Results   Component Value Date    EGFR 9 03/20/2021    EGFR 9 03/19/2021    EGFR 9 03/18/2021    CREATININE 5 47 (H) 03/20/2021    CREATININE 5 49 (H) 03/19/2021    CREATININE 5 56 (H) 03/18/2021   · Likely secondary to volume depletion with diarrhea  · IV fluids  · Nephrology consult

## 2021-03-20 NOTE — NURSING NOTE
Pt drowsy, oriented, pleasant and cooperative  Heart is irregular on ascultation, no edema noted  Lungs are clear on room air, denies cough  Chronic 16F garcia in place, slightly dark urine, some sediment present  Pt encouraged to reposition frequently  Denies pain  Call bell is within reach

## 2021-03-20 NOTE — NURSING NOTE
Discharge instructions discussed with patient and patient's significant other  Both state understanding

## 2021-03-20 NOTE — DISCHARGE INSTRUCTIONS
Chronic Kidney Disease   WHAT YOU NEED TO KNOW:   Chronic kidney disease (CKD) is the gradual and permanent loss of kidney function  It is also called chronic kidney failure, or chronic renal insufficiency  Normally, the kidneys remove fluid, chemicals, and waste from your blood  These wastes are turned into urine by your kidneys  CKD may worsen over time and lead to kidney failure  Your CKD team will help you and your family plan for your care at home  The team will help you create goals and find ways to meet your goals  Your care plan may change over time as your needs change  DISCHARGE INSTRUCTIONS:   Call your local emergency number (911 in the [address]) if:   · You have a seizure  · You have shortness of breath  Call your doctor or nephrologist if:   · You are confused and very drowsy  · You suddenly gain or lose more weight than your healthcare provider has told you is okay  · You have itchy skin or a rash  · You urinate more or less than you normally do  · You have blood in your urine  · You have nausea and are vomiting  · You have fatigue or muscle weakness  · You have hiccups that will not stop  · You have questions or concerns about your condition or care  Medicines:   · Medicines  may be given to decrease blood pressure and get rid of extra fluid  You may also receive medicine to manage health conditions that may occur with CKD, such as anemia, diabetes, and heart disease  · Take your medicine as directed  Contact your healthcare provider if you think your medicine is not helping or if you have side effects  Tell him or her if you are allergic to any medicine  Keep a list of the medicines, vitamins, and herbs you take  Include the amounts, and when and why you take them  Bring the list or the pill bottles to follow-up visits  Carry your medicine list with you in case of an emergency  What you can do to manage CKD: Management may include making some lifestyle changes  Tell your healthcare provider if you have any concerns about being able to make the changes  He or she can help you find solutions, including working with specialists  Ask for help creating a plan to break large goals into smaller steps  Your plan may include any of the following:  · Manage other health conditions  Your healthcare provider will work with you to make a care plan that meets your needs  You will be checked regularly for heart disease or other conditions that can make CKD worse, such as diabetes  Your blood pressure will be closely monitored  You will also get a target blood pressure and help making a plan to reach your target  This may include taking your blood pressure at home  · Maintain a healthy weight  Extra weight can strain your kidneys  Ask what a healthy weight is for you  Your provider can help you create a weight loss plan if you are overweight  · Create an exercise plan  Regular exercise can help you manage CKD, high blood pressure, and diabetes  Exercise also helps control weight  Your provider can help you create exercise goals and a plan to reach those goals  For example, your goal may be to exercise for 30 minutes in a day  Your plan can include breaking exercise into 10 minute sessions, 3 times during the day  · Create a healthy eating plan  Your provider may tell you to eat food low in sodium (salt), potassium, phosphorus, or protein  A dietitian can help you plan meals if needed  Ask how much liquid to drink each day and which liquids are best for you  · Limit alcohol as directed  Alcohol can cause fluid retention and can affect your kidneys  Ask how much alcohol is safe for you  A drink of alcohol is 12 ounces of beer, 5 ounces of wine, or 1½ ounces of liquor  · Do not smoke  Nicotine and other chemicals in cigarettes and cigars can cause kidney damage  Ask your provider for information if you currently smoke and need help to quit   E-cigarettes or smokeless tobacco still contain nicotine  Talk to your provider before you use these products  · Ask about over-the-counter medicines  Medicines such as NSAIDs and laxatives may harm your kidneys  Some cough and cold medicines can raise your blood pressure  Always ask if a medicine is safe before you take it  · Ask about vaccines you may need  Infections such as pneumonia, influenza, and hepatitis can be more harmful or more likely to occur in a person who has CKD  Vaccines lower your risk for infection  Follow up with your doctor as directed: You will need to return for tests to monitor your kidney and nerve function, and your parathyroid hormone level  Your medicines may be changed, based on certain test results  You may also be referred to a nephrologist (kidney specialist)  Write down your questions so you remember to ask them during your visits  © Copyright [address] Information is for End User's use only and may not be sold, redistributed or otherwise used for commercial purposes  All illustrations and images included in CareNotes® are the copyrighted property of A D A M , Inc  or [facility] Suraj Alvarez   The above information is an  only  It is not intended as medical advice for individual conditions or treatments  Talk to your doctor, nurse or pharmacist before following any medical regimen to see if it is safe and effective for you

## 2021-03-20 NOTE — CASE MANAGEMENT
Pt has been evaluated by [facility] and is stable to be discharged  Spoke to pt and SO Cyndee at the bedside and they will eventually transition from [facility] to hospice with [facility]  Notified [facility] the pt is beeing discharged today  Faxed the AVS to [facility] to [phone]  SO Radha Jarvis will transport pt home

## 2021-03-22 NOTE — TELEPHONE ENCOUNTER
Spoke with Dinora Cates and made hospital follow up and also told her to make sure he has blood work done in about 1 week

## 2021-03-22 NOTE — TELEPHONE ENCOUNTER
----- Message from Stephani Arellano Cliff Israel sent at 3/20/2021  7:32 PM EDT -----  Cathi Rogelio was discharged from [facility] today  Please set him up with followup appt in about 2 weeks   Have him get blood work next week  thanks

## 2021-03-29 NOTE — PROGRESS NOTES
Outpatient Care Management Note:  Chart review completed  Hospice referral was completed on 3/25/2021  Closing CM surveillance episode

## 2021-03-29 NOTE — PROGRESS NOTES
Outpatient Care Management   Update from Corazon Fears that patient is now receiving hospice level of care and she will be closing his Rising Risk case

## 2021-04-14 NOTE — TELEPHONE ENCOUNTER
Patient wife called to report the patient is currently on hospice and will be not make future office visit

## 2021-04-22 NOTE — ASSESSMENT & PLAN NOTE
· Chronic garcia catheter in place  · Continue methenamine hippurate  · Outpatient follow-up with Urology h/o bladder cancer. See HPI

## 2022-03-24 NOTE — OCCUPATIONAL THERAPY NOTE
OT Note     03/18/20 1400   Restrictions/Precautions   Other Precautions Contact/isolation; Fall Risk   Therapeutic Excerise-Strength   UE Strength Yes   Right Upper Extremity- Strength   R Shoulder Flexion; Extension   R Elbow Elbow flexion;Elbow extension   R Wrist Wrist flexion;Wrist extension   R Hand   (Forearm pro/supination)   R Weight/Reps/Sets 2# thera p bar, 2# free wt, 3 sets/10   Left Upper Extremity-Strength   L Shoulder Flexion; Extension   L Elbow Elbow flexion;Elbow extension   L Wrist Wrist flexion;Wrist extension   L Hand   (Forearm pro/supination)   L Weights/Reps/Sets 2# thera p bar, 2# free wt, 3 sets/10   Activity Tolerance   Activity Tolerance   (Tolerates tx session)   Assessment   Assessment Pt agreeable participate BUE therex, declines OOB  Completes BUE therex as per above without signs or symptoms of discomfort  Spoke with OTR who is in agreement pt will benefit from continued active OT services in attempt to facilitate increased endurance and activity tolerance to allow participation in self care activities and func txfrs and mobility  Plan   Goal Expiration Date 03/26/20   OT Treatment Day 3   Recommendation   OT Discharge Recommendation Short Term Rehab   Remains in supine, all needs in reach  Prescription approved per [facility] Refill Protocol.

## 2022-07-20 NOTE — TELEPHONE ENCOUNTER
Patient's wife Betsey Smith called requesting appt for 2-9-21 be rescheduled along with Infusion appt  Patient was discharged from hospital recently and patient is not currently walking   Please call Betsey Smith back at [phone] no

## 2022-09-20 NOTE — ASSESSMENT & PLAN NOTE
Left voicemail for patient to inform of Dr. Morgan's advice and to offer dates to schedule the SI joint injection. We have openings on 10/10/2022 in the morning or on 10/11/2022 in the afternoon. Encouraged patient to message us through the [email or web address] website or call us at [phone] to schedule the injection or with any questions or concerns.     · Associated with indwelling Haile catheter  · Continue IV zosyn and IV vancomycin for a 7-day antibiotic course based on the urine culture results    Urine culture [[id number]] (Abnormal)  Collected: 03/11/20 1143   Lab Status: Preliminary result Specimen: Urine, Indwelling Haile Catheter Updated: 03/13/20 1135    Urine Culture >100,000 cfu/ml Escherichia coli ESBLAbnormal     Comment: An Extended-Spectrum Beta-Lactamase is being produced by this organism (requires contact precautions)  Cephalosporins are NOT effective for treating these organisms  For SEVERE infections (i e  bacteremia, septic shock, etc ), Carbapenems are the drug of choice  For MILD infections (i e  isolated urinary or biliary infection), high-dose Zosyn may be used          10,000-19,000 cfu/ml Enterococcus faecalisAbnormal    Susceptibility     Escherichia coli ESBL (1)     Antibiotic Interpretation Microscan Method Status    Amikacin ($$) Susceptible <=16 ug/ml LUIS Preliminary    Amoxicillin + Clavulanate Susceptible 8/4 ug/ml LUIS Preliminary    Ampicillin ($$) Resistant >16 00 ug/ml LUIS Preliminary    Ampicillin + Sulbactam ($) Resistant >16/8 ug/ml LUIS Preliminary    Aztreonam ($$$)  Resistant >16 ug/ml LUIS Preliminary    Cefazolin ($) Resistant >16 00 ug/ml LUIS Preliminary    Cefepime ($) Resistant >16 00 ug/ml LUIS Preliminary    Cefotaxime ($) Resistant >32 00 ug/ml LUIS Preliminary    Ceftazidime ($$) Resistant >16 ug/ml LUIS Preliminary    Ceftriaxone ($$) Resistant >32 00 ug/ml LUIS Preliminary    Cefuroxime ($$) Resistant >16 ug/ml LUIS Preliminary    Ciprofloxacin ($)  Resistant >2 00 ug/ml LUIS Preliminary    Ertapenem ($$$) Susceptible <=0 5 ug/ml LUIS Preliminary    Gentamicin ($$) Resistant >8 ug/ml LUIS Preliminary    Levofloxacin ($) Resistant >4 00 ug/ml LUIS Preliminary    Nitrofurantoin Resistant >64 ug/ml LUIS Preliminary    Piperacillin + Tazobactam ($$$) Susceptible <=4 ug/ml LUIS Preliminary    Tetracycline Susceptible <=4 ug/ml LUIS Preliminary    Tobramycin ($) Intermediate 8 ug/ml LUIS Preliminary    Trimethoprim + Sulfamethoxazole ($$$) Susceptible <=2/38 ug/ml LUIS Preliminary    Enterococcus faecalis (2)     Antibiotic Interpretation Microscan Method Status    Ampicillin ($$) Susceptible <=2 00 ug/ml LUIS Preliminary    Levofloxacin ($) Resistant >4 00 ug/ml LUIS Preliminary    Nitrofurantoin Susceptible <=32 ug/ml LUIS Preliminary    Tetracycline Resistant >8 ug/ml LUIS Preliminary    Vancomycin ($) Susceptible 1 00 ug/ml LUIS Preliminary

## 2023-09-25 NOTE — ASSESSMENT & PLAN NOTE
· Present on admission  · Continue local wound care  · Frequent repositioning Staged Advancement Flap Text: The defect edges were debeveled with a #15 scalpel blade.  Given the location of the defect, shape of the defect and the proximity to free margins a staged advancement flap was deemed most appropriate.  Using a sterile surgical marker, an appropriate advancement flap was drawn incorporating the defect and placing the expected incisions within the relaxed skin tension lines where possible. The area thus outlined was incised deep to adipose tissue with a #15 scalpel blade.  The skin margins were undermined to an appropriate distance in all directions utilizing iris scissors.

## 2024-02-17 NOTE — PROGRESS NOTES
"Occupational Therapy  Daily Treatment     Patient Name: Molly Loera  Age:  74 y.o., Sex:  female  Medical Record #: [id number]  Today's Date: 2/17/2024     Precautions  Precautions: Fall Risk  Comments: poor safety awareness/impulsive         Subjective    \"I'm really not the exercise type.\" \"When I don't like doing something, I will usually just stop right in the middle of it and quit.\" Pt required encouragement for task completion multiple times during session.     Objective       02/17/24 1031   OT Charge Group   OT Cognitive Skill Development First 15 Minutes (Units) 1   OT Cognitive Skill Development Additional 15 Minutes (Units) 1   OT Therapy Activity (Units) 1   OT Therapeutic Exercise (Units) 1   OT Total Time Spent   OT Individual Total Time Spent (Mins) 60   Pain   Intervention Declines   Cognition    Level of Consciousness Alert   Safety Awareness Impulsive;Impaired   New Learning Impaired   Initiation Impaired   Comments Pt participated in simple 14 peice table top pipe tree puzzle. She required increased time and mod-max cues for problem solving, and then eventually encouragement to complete task. Pt did have a picture reference but would put peices in incorrect positions to match picture, and was initially unaware.   Functional Level of Assist   Bed, Chair, Wheelchair Transfer Contact Guard Assist   Bed Chair Wheelchair Transfer Description Adaptive equipment;Set-up of equipment;Supervision for safety   Supine Upper Body Exercises   Supine Upper Body Exercises Yes   Chest Fly 3 sets of 10;Bilateral;Medium Resistance Theraband   Chest Press 3 sets of 10;Bilateral;Weight (See Comments for lbs)  (4lb dowel.)   Front Arm Raise 3 sets of 10;Bilateral;Weight (See Comments for lbs)  (4lb dowel.)   Other Exercise 3 sets of 10;Bilateral;Medium Resistance Theraband; internal and external rotation   Comments v/c for initiation and completion of task.   Balance   Comments Pt completed houshold distance " Outpatient Care Management Note:  In basket discharge alert received  Discharged to home [address] with the plan to transition to hospice  Call placed to [facility] spoke with Barbara Langford  Patient is active with home care, a referral for hospice was not placed  They will request a referral from PCP when Kirsten Alanis and Latonia Zendejas are ready to transition  Will maintain surveillance episode as Kirsten Alanis is active with Prisca STODDARD  "mobility using FWW at CGA level from room to main gym and back with mod-max cues for wayfinding.   Interdisciplinary Plan of Care Collaboration   Patient Position at End of Therapy Seated;Chair Alarm On;Self Releasing Lap Belt Applied;Call Light within Reach;Tray Table within Reach;Phone within Reach         Assessment    Pt with low buy-in to session, but agreeable to participate in all tasks. She self-reported that she tends to just \"give up\" when things get too hard, either physically or mentally. Educated pt on purpose of tx's, with focus on improving overall problem solving and endurance. Pt agreeable that she needs to stick with things for longer.  Strengths: Able to follow instructions, Alert and oriented, Independent prior level of function, Motivated for self care and independence, Pleasant and cooperative, Supportive family, Willingly participates in therapeutic activities  Barriers: Decreased endurance, Fatigue, Generalized weakness, Impaired balance, Impaired activity tolerance, Confused, Impaired functional cognition, Impulsive, Limited mobility, Visual impairment    Plan    ADLs, functional mobility, functional cognitive tasks, standing balance, overall strengthening. Determine DME     DME        Passport items to be completed:  Perform bathroom transfers, complete dressing, complete feeding, get ready for the day, prepare a simple meal, participate in household tasks, adapt home for safety needs, demonstrate home exercise program, complete caregiver training     Occupational Therapy Goals (Active)       Problem: Bathing       Dates: Start:  02/15/24         Goal: STG-Within one week, patient will bathe at SPV level.       Dates: Start:  02/15/24               Problem: Dressing       Dates: Start:  02/15/24         Goal: STG-Within one week, patient will dress LB at SBA level using DME PRN.       Dates: Start:  02/15/24               Problem: Functional Transfers       Dates: Start:  02/15/24         " Goal: STG-Within one week, patient will transfer to toilet at SBA level.       Dates: Start:  02/15/24            Goal: STG-Within one week, patient will transfer to step in shower at SBA level.       Dates: Start:  02/15/24               Problem: OT Long Term Goals       Dates: Start:  02/15/24         Goal: LTG-By discharge, patient will complete basic self care tasks at SPV-Mod I level.       Dates: Start:  02/15/24            Goal: LTG-By discharge, patient will perform bathroom transfers at SPV-Mod I level.       Dates: Start:  02/15/24            Goal: LTG-By discharge, patient will complete basic home management at SPV-Mod I level.       Dates: Start:  02/15/24               Problem: Toileting       Dates: Start:  02/15/24         Goal: STG-Within one week, patient will complete toileting tasks at SBA level.       Dates: Start:  02/15/24

## 2024-02-19 NOTE — PLAN OF CARE
Problem: Potential for Falls  Goal: Patient will remain free of falls  Description: INTERVENTIONS:  - Assess patient frequently for physical needs  -  Identify cognitive and physical deficits and behaviors that affect risk of falls    -  Sylmar fall precautions as indicated by assessment   - Educate patient/family on patient safety including physical limitations  - Instruct patient to call for assistance with activity based on assessment  - Modify environment to reduce risk of injury  - Consider OT/PT consult to assist with strengthening/mobility  Outcome: Progressing This is the Initial  home health certification. [facility]  SOC: 02/09/2024  Dates of service are for 02/09/2024-04/08/2024    See scanned reports for plan of care     Order# [id number]

## 2024-04-04 NOTE — ASSESSMENT & PLAN NOTE
79year old male with past medical history of chronic indwelling Haile catheter, presented with tachypnea, leukocytosis, lactic acidosis, UA indicative of UTI  Noted with leukocytosis, lactic acidosis  Chest x-ray report negative for acute finding  Sepsis present admission likely due to UTI in the settings of chronic indwelling Haile catheter  Currently patient is hemodynamically stable  Previous urine culture noted with ESBL   Will start on IV Zosyn renally adjusted does for now  Will also start on p o  vancomycin for C diff prophylaxis  Follow-up with pending blood and urine culture  Continue with IV hydration and monitor lactic acid until normal   Continue supportive care  Consider ID consult if not improving or worsening 
Any new onset atrial fibrillation  [id number] score 4 points  Not on anticoagulation currently  Rate around 80s-106  Cardiology consult, appreciate recommendation    Add metoprolol 12 5 mg bid per cardio
BP is 147/60  Continue nifedipine  Will add metoprolol 12 5 mg b i d  given atrial fibrillation and high blood pressure per cardiology
BP is reasonably controlled  Continue nifedipine and metoprolol
Continue with p o   Vancomycin prophylaxis since patient is  on systemic IV antibiotics
D-dimer 2 71  Troponin negative  Denies any chest pain, shortness of breath, palpitation, leg swelling  VTE unlikely according to age-adjusted d dimer  Patient does have a history of Covid recently on 1/24/2021 
D-dimer 2 71  Troponin negative  Denies any chest pain, shortness of breath, palpitation, leg swelling  VTE unlikely according to age-adjusted d dimer  Patient does have a history of Covid recently on 1/24/2021  Continue to monitor vital signs 
Denies any fever, chills, nausea, vomiting and diarrhea  Currently on prophylaxis p o   Vanco
Denies any fever, chills, nausea, vomiting and diarrhea  Id consult, appreciate recommendations  Recommended p o  Vancomycin for 3 more days 
EKG on presentation noted with AFib rate controlled  Not previous records to indicated hx of A fib  EKG on 1/28/2021 also noted with AFib  Appears fairly new diagnosis  Not sure if contributing syncopal episode  Patient is on anticoagulation  Chads 2 Vasc score 4  Continue telemetry monitoring    Follow-up with Cardiology consult
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 
Hb 7 8  Multifactorial   Anemia of chronic kidney disease is one of the components  Continue to monitor     Will consider to transfuse if hemoglobin less than 7
Hemoglobin 7 4  Multifactorial   Anemia of chronic kidney disease is one of the components  Continue to monitor     Will consider to transfuse if hemoglobin less than 7
Hemoglobin 7 4  Multifactorial   Anemia of chronic kidney disease is one of the components  Follow-up with CBC in 3 days 
Hemoglobin improved to 9 1  Multifactorial   Anemia of chronic kidney disease is one of the components  Continue to monitor     Will consider to transfuse if hemoglobin less than 7
Lab Results   Component Value Date    EGFR 10 02/12/2021    EGFR 9 02/11/2021    EGFR 9 02/10/2021    CREATININE 5 04 (H) 02/12/2021    CREATININE 5 30 (H) 02/11/2021    CREATININE 5 54 (H) 02/10/2021     Baseline creatinine appears to be around 4 5  CKD stage 5 not on dialysis  Creatinine improving  nephro consult, recommended to give IV hydration in light of intravascular volume depletion  Avoid nephro toxins 
Lab Results   Component Value Date    EGFR 10 02/14/2021    EGFR 11 02/13/2021    EGFR 10 02/12/2021    CREATININE 4 92 (H) 02/14/2021    CREATININE 4 74 (H) 02/13/2021    CREATININE 5 04 (H) 02/12/2021     Baseline creatinine appears to be around 4 5  CKD stage 5 not on dialysis  Creatinine improving  nephro consult, recommended to give IV hydration in light of intravascular volume depletion  Avoid nephro toxins 
Lab Results   Component Value Date    EGFR 10 02/14/2021    EGFR 11 02/13/2021    EGFR 10 02/12/2021    CREATININE 4 92 (H) 02/14/2021    CREATININE 4 74 (H) 02/13/2021    CREATININE 5 04 (H) 02/12/2021     Baseline creatinine appears to be around 4 5  CKD stage 5 not on dialysis  Creatinine improving  nephro consult, recommended to give IV hydration in light of intravascular volume depletion  Avoid nephro toxins  Follow-up with Nephrology as an outpatient 
Lab Results   Component Value Date    EGFR 8 02/09/2021    EGFR 10 01/31/2021    EGFR 10 01/30/2021    CREATININE 6 08 (H) 02/09/2021    CREATININE 4 98 (H) 01/31/2021    CREATININE 5 08 (H) 01/30/2021     Present on admission with history CKD stage 5  Baseline creatinine is around 4 9 range  Currently noted 6 08 in the settings of sepsis  Gentle IV hydration with caution and monitor renal function  Consider nephrology consult if not improving or worsening 
Lab Results   Component Value Date    EGFR 9 02/10/2021    EGFR 8 02/09/2021    EGFR 10 01/31/2021    CREATININE 5 54 (H) 02/10/2021    CREATININE 6 08 (H) 02/09/2021    CREATININE 4 98 (H) 01/31/2021     Baseline creatinine appears to be around 4  CKD stage 5 not on dialysis  nephro consult  Avoid nephro toxins 
Lab Results   Component Value Date    EGFR 9 02/11/2021    EGFR 9 02/10/2021    EGFR 8 02/09/2021    CREATININE 5 30 (H) 02/11/2021    CREATININE 5 54 (H) 02/10/2021    CREATININE 6 08 (H) 02/09/2021     Baseline creatinine appears to be around 4 5  CKD stage 5 not on dialysis  nephro consult, recommended to give IV hydration in light of intravascular volume depletion  Avoid nephro toxins 
Lab Results   Component Value Date    HGBA1C 6 8 (H) 09/21/2020       No results for input(s): POCGLU in the last 72 hours  Blood Sugar Average: Last 72 hrs:     Present on admission with history of diabetes  Most recent A1c 6 8  Controlled  Seems like Levemir discontinued since now controlled when patient was discharged from [facility]  Diabetic diet  Sliding scale coverage 
Lab Results   Component Value Date    HGBA1C 6 8 (H) 09/21/2020       Recent Labs     02/10/21  0825 02/10/21  1219   POCGLU 109 207*       Blood Sugar Average: Last 72 hrs:  (P) 158   Sugar at goal  Acute check and SSI  Currently on a regular diet as per Nutrition
Lab Results   Component Value Date    HGBA1C 6 8 (H) 09/21/2020       Recent Labs     02/10/21  1559 02/10/21  2114 02/11/21  0630 02/11/21  1109   POCGLU 160* 160* 121 185*       Blood Sugar Average: Last 72 hrs:  (P) 157   Sugar at goal  Acute check and SSI  Currently on a regular diet as per Nutrition
Lab Results   Component Value Date    HGBA1C 6 8 (H) 09/21/2020       Recent Labs     02/11/21  1535 02/11/21  2104 02/12/21  0550 02/12/21  1103   POCGLU 193* 258* 130 121       Blood Sugar Average: Last 72 hrs:  (P) 164 4   Sugar at goal  Acute check and SSI  Currently on a regular diet as per Nutrition
Lab Results   Component Value Date    HGBA1C 6 8 (H) 09/21/2020       Recent Labs     02/13/21  2050 02/14/21  0614 02/14/21  1101 02/14/21  1610   POCGLU 246* 108 241* 210*       Blood Sugar Average: Last 72 hrs:  (P) 183 8   Sugar at goal  Acute check and SSI  Currently on a regular diet as per Nutrition
Lab Results   Component Value Date    HGBA1C 6 8 (H) 09/21/2020       Recent Labs     02/14/21  1610 02/14/21 2050 02/15/21  0640 02/15/21  1058   POCGLU 210* 292* 138 203*       Blood Sugar Average: Last 72 hrs:  (P) 583 [id number]   Continue home medications
Malnutrition Findings:   Adult Malnutrition type: Chronic illness  Adult Degree of Malnutrition: Malnutrition of moderate degree    BMI Findings: Body mass index is 22 66 kg/m²  Nutrition consult, appreciate recommendation 
POA, present with tachypnea, leukocytosis, lactic acidosis in the setting of suspected UTI  Sepsis: resolved  Currently, vital signs are stable, leukocytosis is improved  Lactic acidosis has improved after IV hydration  UA consistent with UTI  Patient is asymptomatic  Will follow-up with urine culture  Will switch Zosyn to meropenem given in the history of ESBL and indrawing catheterization     Will consider to deescalate per urine culture result
Patient looks depressed and agitated at time  He is oriented x 2 (place and person)  Psych consult      Will initiate low-dose of Seroquel 12 5 mg HS  Will follow-up with QTC tomorrow
Patient looks depressed and agitated at time  He is oriented x 2 (place and person)  Psych consult    Continue seroquel
Patient looks depressed and agitated at time  He was given Zyprexa IM as needed and switched to Seroquel at night  He is oriented x 2 (place and person)  Psych consult, diagnosed as delirium  Id consult for UTI and ID recommended to discontinue meropenem for his delirium 
Patient looks depressed and crying during our encounter  Denies to answer further questions regarding to it  He is oriented x 2 (place and person)  Psych consult 
Patient was recommended for STR on prior hospitalization  However, patient was discharged with Home health as his choice of SNF did not have available bed  PT/OT consult  CM consult  Discussed with the patient and significant other  Family wants him to discharge home 
Patient was recommended for STR on prior hospitalization  However, patient was discharged with Home health as his choice of SNF did not have available bed  PT/OT consult  CM consult  Discussed with the patient and significant other and they are not agreeable to go to rehab 
Patient was recommended for STR on prior hospitalization  However, patient was discharged with Home health as his choice of SNF did not have available bed  PT/OT consult  CM consult  Discussed with the patient and significant other and they are not agreeable to go to rehab    Will discuss today again
Patient was recommended for STR on prior hospitalization  However, patient was discharged with Home health as his choice of SNF did not have available bed  PT/OT consult  Patient states he prefers to go to [facility] only  CM consult 
Present admission history of hypertension  Controlled  Resume home dose of nifedipine  Monitor vitals per unit protocol 
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 
Present admission history of urinary retention with chronic Haile catheter  Was unable to get hold of wife  Not sure vanc Haile catheter was changed  F/u with wife tomorrow for more info and consider changing it while inpt 
UTI due to chronic indwelling Garcia catheter, as evidenced by UTI in a patient with a chronic indwelling garcia catheter, requiring IV antibiotics and possible change of catheter while inpatient     Continue meropenem   Urine culture growing Klebsiella oxytoca  Blood culture negative x2 so far  Will deescalate antibiotics according to culture and sensitivity results
UTI due to chronic indwelling Garcia catheter, as evidenced by UTI in a patient with a chronic indwelling garcia catheter, requiring IV antibiotics and possible change of catheter while inpatient     Continue meropenem   Urine culture growing Klebsiella oxytoca and Providencia rettgeri  Blood culture negative x2 so far  Will deescalate antibiotics according to culture and sensitivity results
UTI due to chronic indwelling Garcia catheter, as evidenced by UTI in a patient with a chronic indwelling garcia catheter, requiring IV antibiotics and possible change of catheter while inpatient     Continue meropenem   Urine culture growing Klebsiella oxytoca and Providencia rettgeri, Enterococcus faecalis  Blood culture negative x2 so far  Id consult
UTI due to chronic indwelling Garcia catheter, as evidenced by UTI in a patient with a chronic indwelling garcia catheter, requiring IV antibiotics and possible change of catheter while inpatient     Switch Zosyn to meropenem  Will deescalate antibiotics according to culture results
UTI due to chronic indwelling Garcia catheter, as evidenced by UTI in a patient with a chronic indwelling garcia catheter, requiring IV antibiotics and possible change of catheter while inpatient  Urine culture growing Klebsiella oxytoca and Providencia rettgeri, Enterococcus faecalis  Blood culture negative x2 so far  ID consult, appreciate recommendations  Id recommended to discontinue meropenum 
no

## 2024-08-22 NOTE — ASSESSMENT & PLAN NOTE
Reason for call:   [x] Refill   [] Prior Auth  [] Other:     Office:   [] PCP/Provider -   [x] Specialty/Provider - S&P/Mario Cazares MD     Medication: pregabalin (LYRICA) 50 mg capsule     Dose/Frequency: Take 1 capsule in the a.m. 2 capsules at bedtime     Quantity: 90    Pharmacy: [id number] [address]     Does the patient have enough for 3 days?   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

## (undated) DEVICE — LAPROSCOPIC CHOLANGIOGR. CATH KIT

## (undated) DEVICE — SPONGE 4 X 4 XRAY 16 PLY STRL LF RFD

## (undated) DEVICE — SUT VICRYL 0 UR-6 27 IN J603H

## (undated) DEVICE — LASER HOLMIUM FIBER 365 MIC

## (undated) DEVICE — GUIDEWIRE STRGHT TIP 0.035 IN  SOLO PLUS

## (undated) DEVICE — UROCATCH BAG

## (undated) DEVICE — GAUZE SPONGES,16 PLY: Brand: CURITY

## (undated) DEVICE — STERILE SURGICAL LUBRICANT,  TUBE: Brand: SURGILUBE

## (undated) DEVICE — BAG URINE DRAINAGE 2000ML ANTI RFLX LF

## (undated) DEVICE — TROCAR: Brand: KII FIOS FIRST ENTRY

## (undated) DEVICE — 3M™ TEGADERM™ TRANSPARENT FILM DRESSING FRAME STYLE, 1624W, 2-3/8 IN X 2-3/4 IN (6 CM X 7 CM), 100/CT 4CT/CASE: Brand: 3M™ TEGADERM™

## (undated) DEVICE — EYE PADS 1 5/8"X2 5/8": Brand: MCKESSON

## (undated) DEVICE — Device

## (undated) DEVICE — LIGAMAX 5 MM ENDOSCOPIC MULTIPLE CLIP APPLIER: Brand: LIGAMAX

## (undated) DEVICE — SCD SEQUENTIAL COMPRESSION COMFORT SLEEVE MEDIUM KNEE LENGTH: Brand: KENDALL SCD

## (undated) DEVICE — CATH FOLEY 16FR 5ML 2WAY LUBRICATH

## (undated) DEVICE — ENDOPOUCH RETRIEVER SPECIMEN RETRIEVAL BAGS: Brand: ENDOPOUCH RETRIEVER

## (undated) DEVICE — NEEDLE 25G X 1 1/2

## (undated) DEVICE — TRANSPOSAL ULTRAFLEX DUO/QUAD ULTRA CART MANIFOLD

## (undated) DEVICE — SPONGE STICK WITH PVP-I: Brand: KENDALL

## (undated) DEVICE — GLOVE SRG BIOGEL 7

## (undated) DEVICE — LAPAROSCOPY PACK: Brand: MEDLINE INDUSTRIES, INC.

## (undated) DEVICE — INSUFFLATION NEEDLE: Brand: SURGINEEDLE

## (undated) DEVICE — ELECTRODE LAP L HOOK E-Z CLEAN 33CM -0020

## (undated) DEVICE — BASKET STONE RTRVL ZERO TIP 2.4FR

## (undated) DEVICE — GRASPER ATRAUMATIC FEN 5MM X 33CM ROTATING THUMB LOOP GENI-SURGE

## (undated) DEVICE — 5 MM CURVED DISSECTORS WITH MONOPOLAR CAUTERY: Brand: ENDOPATH

## (undated) DEVICE — 3M™ DURAPORE™ SURGICAL TAPE 2 INCHES X 10YARDS (5.0CM X 9.1M) 6ROLLS/CARTON 10CARTONS/CASE 1538-2: Brand: 3M™ DURAPORE™

## (undated) DEVICE — PROXIMATE PLUS MD MULTI-DIRECTIONAL RELEASE SKIN STAPLERS CONTAINS 35 STAINLESS STEEL STAPLES APPROXIMATE CLOSED DIMENSIONS: 6.9MM X 3.9MM WIDE: Brand: PROXIMATE

## (undated) DEVICE — GARMENT,MEDLINE,DVT,INT,CALF,FOAM,MED: Brand: MEDLINE

## (undated) DEVICE — TOWEL SURG XR DETECT GREEN STRL RFD

## (undated) DEVICE — LUBRICANT SURGILUBE TUBE 4 OZ  FLIP TOP

## (undated) DEVICE — [HIGH FLOW INSUFFLATOR,  DO NOT USE IF PACKAGE IS DAMAGED,  KEEP DRY,  KEEP AWAY FROM SUNLIGHT,  PROTECT FROM HEAT AND RADIOACTIVE SOURCES.]: Brand: PNEUMOSURE

## (undated) DEVICE — NEEDLE 18 G X 1 1/2 SAFETY

## (undated) DEVICE — CHLORHEXIDINE 4PCT 4 OZ

## (undated) DEVICE — SYRINGE 10ML LL

## (undated) DEVICE — PACK TUR

## (undated) DEVICE — DRESSING MEPILEX AG BORDER 4 X 12 IN

## (undated) DEVICE — KNIFE OPHTHALMIC 2.9MM ANGL LASEREDGE

## (undated) DEVICE — STOPCOCK 3-WAY

## (undated) DEVICE — GAUZE SPONGES,8 PLY: Brand: CURITY

## (undated) DEVICE — PENCIL ROCKER SWITCH CAUTERY HAND CONTROL

## (undated) DEVICE — INTENDED FOR TISSUE SEPARATION, AND OTHER PROCEDURES THAT REQUIRE A SHARP SURGICAL BLADE TO PUNCTURE OR CUT.: Brand: BARD-PARKER ® CARBON RIB-BACK BLADES

## (undated) DEVICE — IV CATH 14 G X 3 1/4 IN

## (undated) DEVICE — IRRIGATOR DISPOSABLE SUCTION

## (undated) DEVICE — CHLORAPREP HI-LITE 26ML ORANGE

## (undated) DEVICE — SHEATH URETERAL ACCESS 12/14FR 45CM PROXIS

## (undated) DEVICE — GLOVE SRG BIOGEL ECLIPSE 7.5

## (undated) DEVICE — SUT VICRYL 2-0 REEL 54 IN J286G

## (undated) DEVICE — NEEDLE PHACO 20G 30DEG THIN TIP

## (undated) DEVICE — ELECTRODE EXTENDED BLADE 162CM

## (undated) DEVICE — BASIN EMESIS 700CC ROSE 250/CS 64/PLT: Brand: MEDEGEN MEDICAL PRODUCTS, LLC

## (undated) DEVICE — CATH URETERAL 5FR X 70 CM FLEX TIP POLYUR BARD

## (undated) DEVICE — STERILE NEWMAN CATARACT PACK: Brand: CARDINAL HEALTH

## (undated) DEVICE — PACK PHACO

## (undated) DEVICE — GLOVE SRG BIOGEL 8

## (undated) DEVICE — LAPAROSCOPIC SCISSORS: Brand: EPIX LAPAROSCOPIC SCISSORS

## (undated) DEVICE — IRRIGATION HANDPIECE SET 2.2-2.8MM 45DEG ANGL TIP

## (undated) DEVICE — SYRINGE 1ML TB 26G X 3/8 SAFETY

## (undated) DEVICE — STERISTRIP 1/2 X 4IN

## (undated) DEVICE — ANTIBACTERIAL UNDYED BRAIDED (POLYGLACTIN 910), SYNTHETIC ABSORBABLE SUTURE: Brand: COATED VICRYL

## (undated) DEVICE — INVIEW CLEAR LEGGINGS: Brand: CONVERTORS

## (undated) DEVICE — TROCAR 11MM KIT OPTICAL SEPARATOR SYSTEM

## (undated) DEVICE — SWABSTCK, BENZOIN TINCTURE, 1/PK, STRL: Brand: APLICARE